# Patient Record
Sex: MALE | Race: WHITE | Employment: UNEMPLOYED | URBAN - METROPOLITAN AREA
[De-identification: names, ages, dates, MRNs, and addresses within clinical notes are randomized per-mention and may not be internally consistent; named-entity substitution may affect disease eponyms.]

---

## 2017-01-05 ENCOUNTER — TELEPHONE (OUTPATIENT)
Dept: FAMILY MEDICINE CLINIC | Age: 47
End: 2017-01-05

## 2017-01-16 RX ORDER — ZOLPIDEM TARTRATE 10 MG/1
10 TABLET ORAL NIGHTLY PRN
Qty: 30 TABLET | Refills: 3 | Status: SHIPPED | OUTPATIENT
Start: 2017-01-16 | End: 2017-02-15

## 2017-01-27 ENCOUNTER — OFFICE VISIT (OUTPATIENT)
Dept: SLEEP MEDICINE | Age: 47
End: 2017-01-27

## 2017-01-27 VITALS
SYSTOLIC BLOOD PRESSURE: 139 MMHG | HEIGHT: 72 IN | HEART RATE: 95 BPM | OXYGEN SATURATION: 98 % | BODY MASS INDEX: 35.76 KG/M2 | WEIGHT: 264 LBS | DIASTOLIC BLOOD PRESSURE: 60 MMHG

## 2017-01-27 DIAGNOSIS — F51.04 INSOMNIA, PSYCHOPHYSIOLOGICAL: Chronic | ICD-10-CM

## 2017-01-27 DIAGNOSIS — J30.2 SEASONAL ALLERGIC RHINITIS, UNSPECIFIED ALLERGIC RHINITIS TRIGGER: Chronic | ICD-10-CM

## 2017-01-27 DIAGNOSIS — K21.9 GASTROESOPHAGEAL REFLUX DISEASE WITHOUT ESOPHAGITIS: Chronic | ICD-10-CM

## 2017-01-27 DIAGNOSIS — E66.9 NON MORBID OBESITY, UNSPECIFIED OBESITY TYPE: Chronic | ICD-10-CM

## 2017-01-27 DIAGNOSIS — G47.33 OBSTRUCTIVE SLEEP APNEA SYNDROME: Primary | Chronic | ICD-10-CM

## 2017-01-27 PROCEDURE — 99214 OFFICE O/P EST MOD 30 MIN: CPT | Performed by: NURSE PRACTITIONER

## 2017-01-27 ASSESSMENT — SLEEP AND FATIGUE QUESTIONNAIRES
HOW LIKELY ARE YOU TO NOD OFF OR FALL ASLEEP WHILE SITTING QUIETLY AFTER LUNCH WITHOUT ALCOHOL: 0
HOW LIKELY ARE YOU TO NOD OFF OR FALL ASLEEP WHILE LYING DOWN TO REST IN THE AFTERNOON WHEN CIRCUMSTANCES PERMIT: 1
HOW LIKELY ARE YOU TO NOD OFF OR FALL ASLEEP WHILE WATCHING TV: 0
HOW LIKELY ARE YOU TO NOD OFF OR FALL ASLEEP WHILE SITTING INACTIVE IN A PUBLIC PLACE: 0
ESS TOTAL SCORE: 1
HOW LIKELY ARE YOU TO NOD OFF OR FALL ASLEEP IN A CAR, WHILE STOPPED FOR A FEW MINUTES IN TRAFFIC: 0
HOW LIKELY ARE YOU TO NOD OFF OR FALL ASLEEP WHILE SITTING AND TALKING TO SOMEONE: 0
HOW LIKELY ARE YOU TO NOD OFF OR FALL ASLEEP WHEN YOU ARE A PASSENGER IN A CAR FOR AN HOUR WITHOUT A BREAK: 0
HOW LIKELY ARE YOU TO NOD OFF OR FALL ASLEEP WHILE SITTING AND READING: 0

## 2017-01-27 ASSESSMENT — ENCOUNTER SYMPTOMS
SINUS PRESSURE: 0
APNEA: 0
SHORTNESS OF BREATH: 0
ABDOMINAL PAIN: 0
RHINORRHEA: 0
COUGH: 0
ABDOMINAL DISTENTION: 0

## 2017-03-27 RX ORDER — FLUOXETINE HYDROCHLORIDE 20 MG/1
CAPSULE ORAL
Qty: 90 CAPSULE | Refills: 0 | Status: SHIPPED | OUTPATIENT
Start: 2017-03-27 | End: 2017-05-23 | Stop reason: SDUPTHER

## 2017-03-27 RX ORDER — ATORVASTATIN CALCIUM 40 MG/1
TABLET, FILM COATED ORAL
Qty: 30 TABLET | Refills: 0 | Status: SHIPPED | OUTPATIENT
Start: 2017-03-27 | End: 2017-05-02 | Stop reason: SDUPTHER

## 2017-03-28 ENCOUNTER — HOSPITAL ENCOUNTER (OUTPATIENT)
Dept: CT IMAGING | Age: 47
Discharge: HOME | End: 2017-03-28
Attending: UROLOGY | Admitting: UROLOGY

## 2017-03-28 DIAGNOSIS — C64.2 MALIGNANT NEOPLASM OF LEFT KIDNEY, EXCEPT RENAL PELVIS (HCC): ICD-10-CM

## 2017-04-24 ENCOUNTER — TELEPHONE (OUTPATIENT)
Dept: FAMILY MEDICINE CLINIC | Age: 47
End: 2017-04-24

## 2017-04-27 ENCOUNTER — TELEPHONE (OUTPATIENT)
Dept: FAMILY MEDICINE CLINIC | Age: 47
End: 2017-04-27

## 2017-05-01 ENCOUNTER — OFFICE VISIT (OUTPATIENT)
Dept: FAMILY MEDICINE CLINIC | Age: 47
End: 2017-05-01

## 2017-05-01 VITALS
RESPIRATION RATE: 12 BRPM | HEART RATE: 84 BPM | WEIGHT: 279 LBS | SYSTOLIC BLOOD PRESSURE: 128 MMHG | BODY MASS INDEX: 37.84 KG/M2 | DIASTOLIC BLOOD PRESSURE: 80 MMHG

## 2017-05-01 DIAGNOSIS — G89.4 CHRONIC PAIN SYNDROME: Primary | Chronic | ICD-10-CM

## 2017-05-01 PROCEDURE — 99213 OFFICE O/P EST LOW 20 MIN: CPT | Performed by: NURSE PRACTITIONER

## 2017-05-01 ASSESSMENT — ENCOUNTER SYMPTOMS
CONSTIPATION: 0
VOMITING: 0
CHEST TIGHTNESS: 0
ABDOMINAL PAIN: 1
NAUSEA: 0
BLOOD IN STOOL: 0
DIARRHEA: 0
SHORTNESS OF BREATH: 0

## 2017-05-02 RX ORDER — ATORVASTATIN CALCIUM 40 MG/1
TABLET, FILM COATED ORAL
Qty: 30 TABLET | Refills: 2 | Status: SHIPPED | OUTPATIENT
Start: 2017-05-02 | End: 2017-07-27 | Stop reason: SDUPTHER

## 2017-05-12 ENCOUNTER — TELEPHONE (OUTPATIENT)
Dept: SLEEP MEDICINE | Age: 47
End: 2017-05-12

## 2017-05-15 RX ORDER — ZOLPIDEM TARTRATE 10 MG/1
10 TABLET ORAL NIGHTLY PRN
Qty: 30 TABLET | Refills: 1 | Status: SHIPPED | OUTPATIENT
Start: 2017-05-15 | End: 2017-07-10 | Stop reason: SDUPTHER

## 2017-05-23 ENCOUNTER — OFFICE VISIT (OUTPATIENT)
Dept: PSYCHIATRY | Age: 47
End: 2017-05-23

## 2017-05-23 VITALS
HEART RATE: 84 BPM | SYSTOLIC BLOOD PRESSURE: 163 MMHG | RESPIRATION RATE: 18 BRPM | HEIGHT: 72 IN | DIASTOLIC BLOOD PRESSURE: 97 MMHG

## 2017-05-23 DIAGNOSIS — F32.A DEPRESSION, UNSPECIFIED DEPRESSION TYPE: Primary | ICD-10-CM

## 2017-05-23 PROCEDURE — 99214 OFFICE O/P EST MOD 30 MIN: CPT | Performed by: PSYCHIATRY & NEUROLOGY

## 2017-05-23 RX ORDER — FLUOXETINE HYDROCHLORIDE 20 MG/1
CAPSULE ORAL
Qty: 90 CAPSULE | Refills: 1 | Status: SHIPPED | OUTPATIENT
Start: 2017-05-23 | End: 2017-11-27 | Stop reason: SDUPTHER

## 2017-05-23 RX ORDER — QUETIAPINE FUMARATE 400 MG/1
400 TABLET, FILM COATED ORAL NIGHTLY
Qty: 90 TABLET | Refills: 1 | Status: SHIPPED | OUTPATIENT
Start: 2017-05-23 | End: 2017-11-27 | Stop reason: SDUPTHER

## 2017-05-23 RX ORDER — LITHIUM CARBONATE 300 MG/1
TABLET, FILM COATED, EXTENDED RELEASE ORAL
Qty: 180 TABLET | Refills: 1 | Status: SHIPPED | OUTPATIENT
Start: 2017-05-23 | End: 2017-12-15

## 2017-05-30 ENCOUNTER — TELEPHONE (OUTPATIENT)
Dept: FAMILY MEDICINE CLINIC | Age: 47
End: 2017-05-30

## 2017-05-30 DIAGNOSIS — S92.909A FOOT FRACTURE, UNSPECIFIED LATERALITY, CLOSED, INITIAL ENCOUNTER: Primary | ICD-10-CM

## 2017-05-31 ENCOUNTER — OFFICE VISIT (OUTPATIENT)
Dept: ORTHOPEDIC SURGERY | Age: 47
End: 2017-05-31

## 2017-05-31 VITALS
RESPIRATION RATE: 16 BRPM | HEART RATE: 81 BPM | HEIGHT: 72 IN | WEIGHT: 276 LBS | SYSTOLIC BLOOD PRESSURE: 121 MMHG | BODY MASS INDEX: 37.38 KG/M2 | DIASTOLIC BLOOD PRESSURE: 79 MMHG

## 2017-05-31 DIAGNOSIS — F32.A DEPRESSION, UNSPECIFIED DEPRESSION TYPE: ICD-10-CM

## 2017-05-31 DIAGNOSIS — S92.352A CLOSED DISPLACED FRACTURE OF FIFTH METATARSAL BONE OF LEFT FOOT, INITIAL ENCOUNTER: Primary | ICD-10-CM

## 2017-05-31 LAB
CHOLESTEROL, TOTAL: 100 MG/DL (ref 0–199)
HDLC SERPL-MCNC: 47 MG/DL (ref 40–60)
LDL CHOLESTEROL CALCULATED: 37 MG/DL
LITHIUM DOSE AMOUNT: ABNORMAL
LITHIUM LEVEL: 0.3 MMOL/L (ref 0.6–1.2)
TRIGL SERPL-MCNC: 78 MG/DL (ref 0–150)
TSH SERPL DL<=0.05 MIU/L-ACNC: 0.95 UIU/ML (ref 0.27–4.2)
VLDLC SERPL CALC-MCNC: 16 MG/DL

## 2017-05-31 PROCEDURE — 28470 CLTX METATARSAL FX WO MNP EA: CPT | Performed by: ORTHOPAEDIC SURGERY

## 2017-05-31 PROCEDURE — 36415 COLL VENOUS BLD VENIPUNCTURE: CPT | Performed by: PSYCHIATRY & NEUROLOGY

## 2017-05-31 PROCEDURE — 99243 OFF/OP CNSLTJ NEW/EST LOW 30: CPT | Performed by: ORTHOPAEDIC SURGERY

## 2017-06-01 PROBLEM — S92.352A CLOSED DISPLACED FRACTURE OF FIFTH METATARSAL BONE OF LEFT FOOT: Status: ACTIVE | Noted: 2017-06-01

## 2017-06-01 LAB
ESTIMATED AVERAGE GLUCOSE: 102.5 MG/DL
HBA1C MFR BLD: 5.2 %

## 2017-07-10 RX ORDER — ZOLPIDEM TARTRATE 10 MG/1
10 TABLET ORAL NIGHTLY PRN
Qty: 30 TABLET | Refills: 0 | Status: SHIPPED | OUTPATIENT
Start: 2017-07-10 | End: 2017-08-11 | Stop reason: SDUPTHER

## 2017-08-11 ENCOUNTER — OFFICE VISIT (OUTPATIENT)
Dept: SLEEP MEDICINE | Age: 47
End: 2017-08-11

## 2017-08-11 VITALS
OXYGEN SATURATION: 98 % | HEIGHT: 72 IN | SYSTOLIC BLOOD PRESSURE: 110 MMHG | BODY MASS INDEX: 37.38 KG/M2 | DIASTOLIC BLOOD PRESSURE: 70 MMHG | WEIGHT: 276 LBS | HEART RATE: 88 BPM

## 2017-08-11 DIAGNOSIS — K21.9 GASTROESOPHAGEAL REFLUX DISEASE WITHOUT ESOPHAGITIS: Chronic | ICD-10-CM

## 2017-08-11 DIAGNOSIS — E66.9 NON MORBID OBESITY, UNSPECIFIED OBESITY TYPE: Chronic | ICD-10-CM

## 2017-08-11 DIAGNOSIS — G47.33 OBSTRUCTIVE SLEEP APNEA SYNDROME: Primary | Chronic | ICD-10-CM

## 2017-08-11 DIAGNOSIS — F51.04 INSOMNIA, PSYCHOPHYSIOLOGICAL: Chronic | ICD-10-CM

## 2017-08-11 DIAGNOSIS — J30.2 SEASONAL ALLERGIC RHINITIS, UNSPECIFIED ALLERGIC RHINITIS TRIGGER: Chronic | ICD-10-CM

## 2017-08-11 PROCEDURE — 99214 OFFICE O/P EST MOD 30 MIN: CPT | Performed by: NURSE PRACTITIONER

## 2017-08-11 RX ORDER — ZOLPIDEM TARTRATE 10 MG/1
10 TABLET ORAL NIGHTLY PRN
Qty: 90 TABLET | Refills: 1 | Status: SHIPPED | OUTPATIENT
Start: 2017-08-11 | End: 2018-01-12 | Stop reason: SDUPTHER

## 2017-08-11 ASSESSMENT — ENCOUNTER SYMPTOMS
SINUS PRESSURE: 0
ABDOMINAL DISTENTION: 0
ABDOMINAL PAIN: 0
SHORTNESS OF BREATH: 0
COUGH: 0
RHINORRHEA: 0
APNEA: 0

## 2017-08-11 ASSESSMENT — SLEEP AND FATIGUE QUESTIONNAIRES
HOW LIKELY ARE YOU TO NOD OFF OR FALL ASLEEP WHILE SITTING AND READING: 0
HOW LIKELY ARE YOU TO NOD OFF OR FALL ASLEEP WHILE SITTING AND TALKING TO SOMEONE: 0
HOW LIKELY ARE YOU TO NOD OFF OR FALL ASLEEP WHEN YOU ARE A PASSENGER IN A CAR FOR AN HOUR WITHOUT A BREAK: 0
HOW LIKELY ARE YOU TO NOD OFF OR FALL ASLEEP WHILE SITTING INACTIVE IN A PUBLIC PLACE: 0
ESS TOTAL SCORE: 2
HOW LIKELY ARE YOU TO NOD OFF OR FALL ASLEEP WHILE WATCHING TV: 1
HOW LIKELY ARE YOU TO NOD OFF OR FALL ASLEEP IN A CAR, WHILE STOPPED FOR A FEW MINUTES IN TRAFFIC: 0
HOW LIKELY ARE YOU TO NOD OFF OR FALL ASLEEP WHILE SITTING QUIETLY AFTER LUNCH WITHOUT ALCOHOL: 0
HOW LIKELY ARE YOU TO NOD OFF OR FALL ASLEEP WHILE LYING DOWN TO REST IN THE AFTERNOON WHEN CIRCUMSTANCES PERMIT: 1

## 2017-11-27 RX ORDER — LITHIUM CARBONATE 300 MG/1
TABLET, FILM COATED, EXTENDED RELEASE ORAL
Qty: 180 TABLET | Refills: 0 | Status: SHIPPED | OUTPATIENT
Start: 2017-11-27 | End: 2018-02-10 | Stop reason: SDUPTHER

## 2017-11-27 RX ORDER — FLUOXETINE HYDROCHLORIDE 20 MG/1
CAPSULE ORAL
Qty: 90 CAPSULE | Refills: 0 | Status: SHIPPED | OUTPATIENT
Start: 2017-11-27 | End: 2018-04-09 | Stop reason: SDUPTHER

## 2017-11-27 RX ORDER — QUETIAPINE FUMARATE 400 MG/1
400 TABLET, FILM COATED ORAL NIGHTLY
Qty: 90 TABLET | Refills: 0 | Status: SHIPPED | OUTPATIENT
Start: 2017-11-27 | End: 2018-04-09 | Stop reason: SDUPTHER

## 2017-12-15 ENCOUNTER — OFFICE VISIT (OUTPATIENT)
Dept: FAMILY MEDICINE CLINIC | Age: 47
End: 2017-12-15

## 2017-12-15 VITALS
DIASTOLIC BLOOD PRESSURE: 86 MMHG | TEMPERATURE: 98.7 F | RESPIRATION RATE: 14 BRPM | HEIGHT: 72 IN | HEART RATE: 68 BPM | BODY MASS INDEX: 35.76 KG/M2 | SYSTOLIC BLOOD PRESSURE: 120 MMHG | WEIGHT: 264 LBS

## 2017-12-15 DIAGNOSIS — I63.312 CEREBRAL INFARCTION DUE TO THROMBOSIS OF LEFT MIDDLE CEREBRAL ARTERY (HCC): ICD-10-CM

## 2017-12-15 DIAGNOSIS — N52.01 ERECTILE DYSFUNCTION DUE TO ARTERIAL INSUFFICIENCY: Primary | ICD-10-CM

## 2017-12-15 DIAGNOSIS — M54.50 ACUTE RIGHT-SIDED LOW BACK PAIN WITHOUT SCIATICA: ICD-10-CM

## 2017-12-15 PROBLEM — S92.352A CLOSED DISPLACED FRACTURE OF FIFTH METATARSAL BONE OF LEFT FOOT: Status: RESOLVED | Noted: 2017-06-01 | Resolved: 2017-12-15

## 2017-12-15 PROCEDURE — 99214 OFFICE O/P EST MOD 30 MIN: CPT | Performed by: FAMILY MEDICINE

## 2017-12-15 RX ORDER — SILDENAFIL CITRATE 20 MG/1
80 TABLET ORAL PRN
Qty: 30 TABLET | Refills: 5 | Status: SHIPPED | OUTPATIENT
Start: 2017-12-15 | End: 2019-06-06 | Stop reason: SDUPTHER

## 2017-12-15 NOTE — PROGRESS NOTES
Subjective:      Patient ID: Dom Perez is a 56 y.o. male. Blood pressure 120/86, pulse 68, temperature 98.7 °F (37.1 °C), temperature source Oral, resp. rate 14, height 6' (1.829 m), weight 264 lb (119.7 kg). HPI here for check up on chronic med problems as below. He is seeing dr Emani Durbin for depression. Has done very well on lithium, prozac. Uses seroquel also, which helps with sleep. Sees dr Noel Garcia for agustina and insomnia- on cpap and ambien long term. He remains alcohol free x 2 yrs and goes to AA. Not smoking  He is happy to be past all of the intra-abdominal infections and surgeries. I prescribe atorva for Hyperlipidemia: no CAD, but has had a stroke. lipids are stable:     Lab Results   Component Value Date    CHOL 100 05/31/2017    TRIG 78 05/31/2017    HDL 47 05/31/2017    LDLCALC 37 05/31/2017     Lab Results   Component Value Date    ALT 10 05/14/2017    AST 9 (L) 05/14/2017         Does  Use prilosec OTC for barretts and gerd- is now asymptomatic. Last EGD was in 2016. Sees Jaja. Today he wants to discuss that he has had R lower back pain for the past month. Seems worst when lays down overnight, with prolonged sitting. Better if up and moving about. Bending is also painful. No radiation. No injury recalled. No leg weakness, numbness or tingling. Is constant, but worst first thing in the morning. No major back pain in past.  rx used: tylenol. did not help much. 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 Not exercising much. Also he is concerned about erectile dysfunction the past few months. Maybe 6 months. Interest in sex is good. Able to achieve erection, but difficult to maintain. Does have some AM tumescence, but is only partial.  Unable to maintain erection for orgasm. No marital discord. Has not had any recent psych med changes- on current regimen for years. No bp meds. No hx HTN.     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  S/P right knee arthroscopy    Chondromalacia of patellofemoral joint    Chronic pain syndrome    Cerebral infarction due to thrombosis of left middle cerebral artery (HCC)    Intracranial hypertension    Renal mass, left--found incidentally on ct 1/16 (for small bowel obst)-sent to urology 2/16--dr Brant Warren [facility]TOHism ([facility])--1/16--detoxed at [facility]--attending AA    Gastroesophageal reflux disease with esophagitis--on daily ppi    Intractable chronic migraine without aura and with status migrainosus      Body mass index is 35.8 kg/m².     Wt Readings from Last 3 Encounters:   12/15/17 264 lb (119.7 kg)   08/11/17 276 lb (125.2 kg)   05/31/17 276 lb (125.2 kg)      BP Readings from Last 3 Encounters:   12/15/17 120/86   08/11/17 110/70   05/31/17 121/79      Current Outpatient Prescriptions   Medication Sig Dispense Refill    lithium (LITHOBID) 300 MG extended release tablet TAKE 1 TABLET BY MOUTH TWICE DAILY 180 tablet 0    FLUoxetine (PROZAC) 20 MG capsule TAKE 1 CAPSULE BY MOUTH DAILY 90 capsule 0    QUEtiapine (SEROQUEL) 400 MG tablet Take 1 tablet by mouth nightly 90 tablet 0    atorvastatin (LIPITOR) 40 MG tablet TAKE 1 TABLET BY MOUTH EVERY NIGHT AT BEDTIME 30 tablet 3    zolpidem (AMBIEN) 10 MG tablet Take 1 tablet by mouth nightly as needed for Sleep 90 tablet 1    aspirin 81 MG tablet   Take 81 mg by mouth daily       calcium-vitamin D (OSCAL 500/200 D-3) 500-200 MG-UNIT per tablet Take 2 tablets by mouth daily Take 1 tablet by mouth daily.  omeprazole (PRILOSEC OTC) 20 MG tablet Take 20 mg by mouth 2 times daily.  Multiple Vitamin TABS Take 1 tablet by mouth daily        No current facility-administered medications for this visit. Immunization History   Administered Date(s) Administered    HIB PRP-T (ActHIB, Hiberix) 09/13/2010    Hib, unspecified foumulation 02/09/2015    Influenza Whole 10/04/2012    Influenza, Intradermal, Preservative free 10/28/2014    Influenza, Intradermal, Quadrivalent, Preservative Free 12/13/2016    Meningococcal MCV4P (Menactra) 02/09/2015    Meningococcal Vaccine, unspecified formulation 01/15/2009    Pneumococcal 13-valent Conjugate ([id number]) 02/09/2015    Pneumococcal Polysaccharide ([id number]) 01/15/2009, 10/09/2013    Tdap (Boostrix, Adacel) 01/01/2007, 05/03/2014        Family History   Problem Relation Age of Onset    Cancer Father      Lymphoma    Dementia Other     Diabetes Other     Cancer Other     Diabetes Maternal Uncle     Heart Disease Maternal Uncle     Depression Other      Social History     Social History    Marital status:      Spouse name: April Joiner Number of children: 2    Years of education: N/A     Occupational History     --sales at [employer]      Social History Main Topics    Smoking status: Former Smoker     Packs/day: 0.50     Years: 25.00     Types: Cigarettes    Smokeless tobacco: Never Used      Comment: will try to quit after the wound has improved and he's dealing with ETOH    Alcohol use No      Comment: last drink 2months ago    Drug use: No    Sexual activity: Yes     Partners: Female     Other Topics Concern    Not on file     Social History Narrative    No narrative on file      Review of Systems No chest pains, dizziness, heart palpitations, dyspnea, lightheadedness, worsening edema. Objective:   Physical Exam   Constitutional: He is oriented to person, place, and time.  He appears well-developed and well-nourished. No distress. HENT:   Head: Normocephalic and atraumatic. Nose: Nose normal.   Mouth/Throat: Oropharynx is clear and moist. No oropharyngeal exudate. TM's: nl  Hearing: nl   Eyes: Conjunctivae are normal. Pupils are equal, round, and reactive to light. Right eye exhibits no discharge. Left eye exhibits no discharge. Neck: Normal range of motion. Neck supple. No thyromegaly present. Cardiovascular: Normal rate, regular rhythm, normal heart sounds and intact distal pulses. Pulmonary/Chest: Effort normal and breath sounds normal. No respiratory distress. He has no wheezes. He has no rales. Musculoskeletal:   Lumbar spine: FROM. SLR neg. DTR's intact. Sensation normal LE's. Nontender over lumbar spine. Palpation of his R SI joint reproduces his pain. Lymphadenopathy:     He has no cervical adenopathy. Right: No supraclavicular adenopathy present. Left: No supraclavicular adenopathy present. Neurological: He is alert and oriented to person, place, and time. Skin: Skin is warm and dry. He is not diaphoretic. Psychiatric: He has a normal mood and affect. His behavior is normal. Judgment and thought content normal.   Nursing note and vitals reviewed. Assessment:      1. Erectile dysfunction due to arterial insufficiency  - discussed that ED is related to blood vessel dysfunction. Weight loss usually helps. OK to use viagra; discussed risks, side effects, etc.    - Prolactin; Future (test this the next time he has labs drawn); TSH is normal and monitored regularly by psych due to lithium use. 2. Acute right-sided low back pain without sciatica  - likely SI dycfunction. Referred to PT. Avoid NSAIDs due to prior CVA. - External Referral To Physical Therapy    3. Cerebral infarction due to thrombosis of left middle cerebral artery (HCC)  - Stable; continue asa and atorva daily. encouraged to resume exercise when back feels better. Plan:       Fu prn.

## 2018-01-12 RX ORDER — ZOLPIDEM TARTRATE 10 MG/1
10 TABLET ORAL NIGHTLY PRN
Qty: 30 TABLET | Refills: 0 | Status: SHIPPED | OUTPATIENT
Start: 2018-01-12 | End: 2018-02-09 | Stop reason: SDUPTHER

## 2018-02-09 ENCOUNTER — OFFICE VISIT (OUTPATIENT)
Dept: SLEEP MEDICINE | Age: 48
End: 2018-02-09

## 2018-02-09 VITALS
DIASTOLIC BLOOD PRESSURE: 70 MMHG | HEIGHT: 72 IN | WEIGHT: 276.4 LBS | SYSTOLIC BLOOD PRESSURE: 124 MMHG | BODY MASS INDEX: 37.44 KG/M2 | HEART RATE: 84 BPM | OXYGEN SATURATION: 97 % | RESPIRATION RATE: 16 BRPM

## 2018-02-09 DIAGNOSIS — E66.9 NON MORBID OBESITY, UNSPECIFIED OBESITY TYPE: Chronic | ICD-10-CM

## 2018-02-09 DIAGNOSIS — J30.2 SEASONAL ALLERGIC RHINITIS, UNSPECIFIED CHRONICITY, UNSPECIFIED TRIGGER: Chronic | ICD-10-CM

## 2018-02-09 DIAGNOSIS — K21.9 GASTROESOPHAGEAL REFLUX DISEASE WITHOUT ESOPHAGITIS: Chronic | ICD-10-CM

## 2018-02-09 DIAGNOSIS — F51.04 INSOMNIA, PSYCHOPHYSIOLOGICAL: Chronic | ICD-10-CM

## 2018-02-09 DIAGNOSIS — G47.33 OBSTRUCTIVE SLEEP APNEA SYNDROME: Primary | Chronic | ICD-10-CM

## 2018-02-09 PROCEDURE — 99214 OFFICE O/P EST MOD 30 MIN: CPT | Performed by: NURSE PRACTITIONER

## 2018-02-09 RX ORDER — ZOLPIDEM TARTRATE 10 MG/1
10 TABLET ORAL NIGHTLY PRN
Qty: 90 TABLET | Refills: 1 | Status: SHIPPED | OUTPATIENT
Start: 2018-02-09 | End: 2018-05-10

## 2018-02-09 ASSESSMENT — ENCOUNTER SYMPTOMS
RHINORRHEA: 0
ABDOMINAL DISTENTION: 0
SHORTNESS OF BREATH: 0
COUGH: 0
APNEA: 0
SINUS PRESSURE: 0
ABDOMINAL PAIN: 0

## 2018-02-09 ASSESSMENT — SLEEP AND FATIGUE QUESTIONNAIRES
HOW LIKELY ARE YOU TO NOD OFF OR FALL ASLEEP WHILE SITTING AND TALKING TO SOMEONE: 0
HOW LIKELY ARE YOU TO NOD OFF OR FALL ASLEEP WHEN YOU ARE A PASSENGER IN A CAR FOR AN HOUR WITHOUT A BREAK: 0
HOW LIKELY ARE YOU TO NOD OFF OR FALL ASLEEP WHILE LYING DOWN TO REST IN THE AFTERNOON WHEN CIRCUMSTANCES PERMIT: 1
HOW LIKELY ARE YOU TO NOD OFF OR FALL ASLEEP WHILE SITTING INACTIVE IN A PUBLIC PLACE: 0
HOW LIKELY ARE YOU TO NOD OFF OR FALL ASLEEP IN A CAR, WHILE STOPPED FOR A FEW MINUTES IN TRAFFIC: 0
HOW LIKELY ARE YOU TO NOD OFF OR FALL ASLEEP WHILE WATCHING TV: 0
HOW LIKELY ARE YOU TO NOD OFF OR FALL ASLEEP WHILE SITTING QUIETLY AFTER LUNCH WITHOUT ALCOHOL: 0
ESS TOTAL SCORE: 1
HOW LIKELY ARE YOU TO NOD OFF OR FALL ASLEEP WHILE SITTING AND READING: 0

## 2018-02-09 NOTE — PROGRESS NOTES
wake time  6:30am   Taking Naps  no   If taking naps usual length    [x] NA   If taking naps using the machine  [] Yes  [] No  [x] NA   Drowsy when driving  [] Yes  [x] No     Does patient carry a DOT/CDL  [] Yes  [x] No     Does patient carry FAA/[employer] License   [] Yes  [x] No      Any concerns noted with the machine at this time  [] Yes  [x] No         Review of Systems   Constitutional: Negative for appetite change, chills, fatigue and fever. HENT: Negative for congestion, nosebleeds, rhinorrhea and sinus pressure. Respiratory: Negative for apnea, cough and shortness of breath. Cardiovascular: Negative for chest pain and palpitations. Gastrointestinal: Negative for abdominal distention and abdominal pain. Neurological: Negative for dizziness and headaches. Social History     Social History    Marital status:      Spouse name: Bri Conrad Number of children: 2    Years of education: N/A     Occupational History     --sales at [employer]      Social History Main Topics    Smoking status: Former Smoker     Packs/day: 0.50     Years: 25.00     Types: Cigarettes    Smokeless tobacco: Never Used      Comment: will try to quit after the wound has improved and he's dealing with ETOH    Alcohol use No      Comment: last drink 2months ago    Drug use: No    Sexual activity: Yes     Partners: Female     Other Topics Concern    Not on file     Social History Narrative    No narrative on file       Prior to Admission medications    Medication Sig Start Date End Date Taking? Authorizing Provider   zolpidem (AMBIEN) 10 MG tablet Take 1 tablet by mouth nightly as needed for Sleep for up to 90 days.  2/9/18 5/10/18 Yes Jacqui Carr CNP   lithium (LITHOBID) 300 MG extended release tablet TAKE 1 TABLET BY MOUTH TWICE DAILY 11/27/17  Yes Freddy Anglin MD   FLUoxetine (PROZAC) 20 MG capsule TAKE 1 CAPSULE BY MOUTH DAILY 11/27/17  Yes Freddy Anglin MD   QUEtiapine (SEROQUEL) 400 MG tablet Take 1 tablet by mouth nightly 11/27/17  Yes Arnulfo Antunez MD   atorvastatin (LIPITOR) 40 MG tablet TAKE 1 TABLET BY MOUTH EVERY NIGHT AT BEDTIME 11/3/17  Yes Jayjay Gomez MD   aspirin 81 MG tablet   Take 81 mg by mouth daily    Yes Historical Provider, MD   calcium-vitamin D (OSCAL 500/200 D-3) 500-200 MG-UNIT per tablet Take 2 tablets by mouth daily Take 1 tablet by mouth daily. Yes Historical Provider, MD   omeprazole (PRILOSEC OTC) 20 MG tablet Take 20 mg by mouth 2 times daily. Yes Historical Provider, MD   Multiple Vitamin TABS Take 1 tablet by mouth daily    Yes Historical Provider, MD   sildenafil (REVATIO) 20 MG tablet Take 4 tablets by mouth as needed (30 min prior to intercourse) 12/15/17   Jayjay Gomze MD       Allergies as of 02/09/2018 - Review Complete 02/09/2018   Allergen Reaction Noted    Nsaids Nausea Only 07/11/2013    Prochlorperazine  12/30/2015    Morphine Rash 04/26/2016       Patient Active Problem List   Diagnosis    Insomnia-chronic intermittent--uses prn ambien--to be filled by dr Donna Wood (sleep dr)   [place] daily ppi-last egd 5/15--dr Regi Gtz History of tobacco useadvised to quit- quit 7/1/2014    Allergic rhinitis, seasonal    Obstructive sleep apnea,Severe -(on cpap)     Morbid Obesity-s/p rou en y-2009    Depression-on daily effexor xr--sees dr Loreto Buckley    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     S/P right knee arthroscopy    Chondromalacia of patellofemoral joint    Chronic pain syndrome    Cerebral infarction due to thrombosis of left middle cerebral artery (HCC)    Intracranial hypertension    Renal mass, left--found incidentally on ct 1/16 (for small bowel obst)-[facility]--[facility]--attending AA    Gastroesophageal reflux disease with esophagitis--on daily ppi    Intractable chronic

## 2018-02-12 RX ORDER — ATORVASTATIN CALCIUM 40 MG/1
TABLET, FILM COATED ORAL
Qty: 30 TABLET | Refills: 3 | Status: SHIPPED | OUTPATIENT
Start: 2018-02-12 | End: 2018-06-26 | Stop reason: SDUPTHER

## 2018-02-14 RX ORDER — LITHIUM CARBONATE 300 MG/1
TABLET, FILM COATED, EXTENDED RELEASE ORAL
Qty: 60 TABLET | Refills: 0 | Status: SHIPPED | OUTPATIENT
Start: 2018-02-14 | End: 2018-03-28 | Stop reason: SDUPTHER

## 2018-02-20 ENCOUNTER — HOSPITAL ENCOUNTER (OUTPATIENT)
Dept: CT IMAGING | Age: 48
Discharge: HOME | End: 2018-02-20
Attending: UROLOGY | Admitting: UROLOGY

## 2018-02-20 VITALS
DIASTOLIC BLOOD PRESSURE: 69 MMHG | SYSTOLIC BLOOD PRESSURE: 130 MMHG | RESPIRATION RATE: 18 BRPM | OXYGEN SATURATION: 98 % | TEMPERATURE: 98.6 F | HEART RATE: 79 BPM

## 2018-02-20 DIAGNOSIS — C64.2 MALIGNANT NEOPLASM OF LEFT KIDNEY, EXCEPT RENAL PELVIS (HCC): ICD-10-CM

## 2018-02-20 LAB
INR BLD: 1.05 (ref 0.85–1.15)
PLATELET # BLD: 402 K/UL (ref 135–450)
PROTHROMBIN TIME: 11.9 SEC (ref 9.6–13)

## 2018-02-20 RX ORDER — ONDANSETRON 2 MG/ML
4 INJECTION INTRAMUSCULAR; INTRAVENOUS EVERY 8 HOURS PRN
Status: DISCONTINUED | OUTPATIENT
Start: 2018-02-20 | End: 2018-02-21 | Stop reason: HOSPADM

## 2018-02-20 RX ORDER — MIDAZOLAM HYDROCHLORIDE 1 MG/ML
INJECTION INTRAMUSCULAR; INTRAVENOUS DAILY PRN
Status: COMPLETED | OUTPATIENT
Start: 2018-02-20 | End: 2018-02-20

## 2018-02-20 RX ORDER — ACETAMINOPHEN 325 MG/1
650 TABLET ORAL EVERY 4 HOURS PRN
Status: DISCONTINUED | OUTPATIENT
Start: 2018-02-20 | End: 2018-02-21 | Stop reason: HOSPADM

## 2018-02-20 RX ORDER — FENTANYL CITRATE 50 UG/ML
INJECTION, SOLUTION INTRAMUSCULAR; INTRAVENOUS DAILY PRN
Status: COMPLETED | OUTPATIENT
Start: 2018-02-20 | End: 2018-02-20

## 2018-02-20 RX ADMIN — FENTANYL CITRATE 50 MCG: 50 INJECTION, SOLUTION INTRAMUSCULAR; INTRAVENOUS at 13:43

## 2018-02-20 RX ADMIN — MIDAZOLAM HYDROCHLORIDE 2 MG: 1 INJECTION INTRAMUSCULAR; INTRAVENOUS at 13:43

## 2018-02-20 ASSESSMENT — PAIN SCALES - GENERAL
PAINLEVEL_OUTOF10: 0

## 2018-02-23 ENCOUNTER — PATIENT MESSAGE (OUTPATIENT)
Dept: FAMILY MEDICINE CLINIC | Age: 48
End: 2018-02-23

## 2018-02-23 DIAGNOSIS — C64.2 RENAL CELL CARCINOMA OF LEFT KIDNEY (HCC): Primary | ICD-10-CM

## 2018-02-23 NOTE — TELEPHONE ENCOUNTER
From: Emmett Waller  To: Keena Weeks MD  Sent: 2/23/2018 8:51 AM EST  Subject: Non-Urgent Medical Question    I have been seeing a urologist regarding a renal cell carcinoma on my right kidney however I would like a second opinion.  Wondering if you might be able to refer me to a nephrologist.

## 2018-03-28 RX ORDER — LITHIUM CARBONATE 300 MG/1
TABLET, FILM COATED, EXTENDED RELEASE ORAL
Qty: 60 TABLET | Refills: 0 | Status: SHIPPED | OUTPATIENT
Start: 2018-03-28 | End: 2018-04-09 | Stop reason: SDUPTHER

## 2018-04-01 PROBLEM — K62.5 RECTAL BLEED: Status: ACTIVE | Noted: 2018-04-01

## 2018-04-02 PROBLEM — N28.89 RENAL MASS, LEFT: Status: ACTIVE | Noted: 2018-04-02

## 2018-04-02 PROBLEM — D62 ACUTE BLOOD LOSS ANEMIA: Status: ACTIVE | Noted: 2018-04-02

## 2018-04-02 PROBLEM — D50.9 IRON DEFICIENCY ANEMIA: Status: ACTIVE | Noted: 2018-04-02

## 2018-04-02 PROBLEM — R07.89 RIGHT-SIDED CHEST WALL PAIN: Status: ACTIVE | Noted: 2018-04-02

## 2018-04-02 PROBLEM — E53.8 B12 DEFICIENCY: Status: ACTIVE | Noted: 2018-04-02

## 2018-04-02 PROBLEM — K92.2 GI BLEED: Status: ACTIVE | Noted: 2018-04-01

## 2018-04-02 PROBLEM — K28.9 ANASTOMOTIC ULCER: Status: ACTIVE | Noted: 2018-04-02

## 2018-04-05 DIAGNOSIS — N52.01 ERECTILE DYSFUNCTION DUE TO ARTERIAL INSUFFICIENCY: ICD-10-CM

## 2018-04-05 LAB — PROLACTIN: 6.3 NG/ML

## 2018-04-09 ENCOUNTER — OFFICE VISIT (OUTPATIENT)
Dept: PSYCHIATRY | Age: 48
End: 2018-04-09

## 2018-04-09 VITALS
HEIGHT: 72 IN | HEART RATE: 104 BPM | WEIGHT: 285.4 LBS | DIASTOLIC BLOOD PRESSURE: 60 MMHG | BODY MASS INDEX: 38.66 KG/M2 | SYSTOLIC BLOOD PRESSURE: 124 MMHG

## 2018-04-09 DIAGNOSIS — F32.A DEPRESSION, UNSPECIFIED DEPRESSION TYPE: Primary | ICD-10-CM

## 2018-04-09 PROCEDURE — 99214 OFFICE O/P EST MOD 30 MIN: CPT | Performed by: PSYCHIATRY & NEUROLOGY

## 2018-04-09 RX ORDER — LITHIUM CARBONATE 300 MG/1
TABLET, FILM COATED, EXTENDED RELEASE ORAL
Qty: 180 TABLET | Refills: 1 | Status: SHIPPED | OUTPATIENT
Start: 2018-04-09 | End: 2018-05-02 | Stop reason: SDUPTHER

## 2018-04-09 RX ORDER — QUETIAPINE FUMARATE 400 MG/1
200 TABLET, FILM COATED ORAL NIGHTLY
Qty: 90 TABLET | Refills: 0 | Status: SHIPPED | OUTPATIENT
Start: 2018-04-09 | End: 2018-12-18 | Stop reason: SDUPTHER

## 2018-04-09 RX ORDER — FLUOXETINE HYDROCHLORIDE 20 MG/1
CAPSULE ORAL
Qty: 90 CAPSULE | Refills: 1 | Status: SHIPPED | OUTPATIENT
Start: 2018-04-09 | End: 2018-11-05 | Stop reason: SDUPTHER

## 2018-04-13 ENCOUNTER — OFFICE VISIT (OUTPATIENT)
Dept: FAMILY MEDICINE CLINIC | Age: 48
End: 2018-04-13

## 2018-04-13 VITALS
DIASTOLIC BLOOD PRESSURE: 76 MMHG | BODY MASS INDEX: 38.47 KG/M2 | HEART RATE: 70 BPM | WEIGHT: 284 LBS | TEMPERATURE: 98.8 F | RESPIRATION RATE: 14 BRPM | HEIGHT: 72 IN | SYSTOLIC BLOOD PRESSURE: 120 MMHG

## 2018-04-13 DIAGNOSIS — K92.2 GASTROINTESTINAL HEMORRHAGE, UNSPECIFIED GASTROINTESTINAL HEMORRHAGE TYPE: Primary | ICD-10-CM

## 2018-04-13 DIAGNOSIS — D64.9 ANEMIA, UNSPECIFIED TYPE: ICD-10-CM

## 2018-04-13 DIAGNOSIS — N28.89 RENAL MASS, LEFT: ICD-10-CM

## 2018-04-13 DIAGNOSIS — S20.211A CONTUSION OF RIGHT CHEST WALL, INITIAL ENCOUNTER: ICD-10-CM

## 2018-04-13 PROCEDURE — 1111F DSCHRG MED/CURRENT MED MERGE: CPT | Performed by: FAMILY MEDICINE

## 2018-04-13 PROCEDURE — 99214 OFFICE O/P EST MOD 30 MIN: CPT | Performed by: FAMILY MEDICINE

## 2018-05-01 DIAGNOSIS — F32.A DEPRESSION, UNSPECIFIED DEPRESSION TYPE: ICD-10-CM

## 2018-05-01 LAB
ANION GAP SERPL CALCULATED.3IONS-SCNC: 11 MMOL/L (ref 3–16)
BUN BLDV-MCNC: 7 MG/DL (ref 7–20)
CALCIUM SERPL-MCNC: 9.3 MG/DL (ref 8.3–10.6)
CHLORIDE BLD-SCNC: 104 MMOL/L (ref 99–110)
CHOLESTEROL, TOTAL: 102 MG/DL (ref 0–199)
CO2: 27 MMOL/L (ref 21–32)
CREAT SERPL-MCNC: 0.7 MG/DL (ref 0.9–1.3)
GFR AFRICAN AMERICAN: >60
GFR NON-AFRICAN AMERICAN: >60
GLUCOSE BLD-MCNC: 92 MG/DL (ref 70–99)
HDLC SERPL-MCNC: 56 MG/DL (ref 40–60)
LDL CHOLESTEROL CALCULATED: 28 MG/DL
LITHIUM DOSE AMOUNT: ABNORMAL
LITHIUM LEVEL: 0.4 MMOL/L (ref 0.6–1.2)
POTASSIUM SERPL-SCNC: 4.5 MMOL/L (ref 3.5–5.1)
SODIUM BLD-SCNC: 142 MMOL/L (ref 136–145)
TRIGL SERPL-MCNC: 90 MG/DL (ref 0–150)
TSH SERPL DL<=0.05 MIU/L-ACNC: 1.14 UIU/ML (ref 0.27–4.2)
VLDLC SERPL CALC-MCNC: 18 MG/DL

## 2018-05-02 ENCOUNTER — PATIENT MESSAGE (OUTPATIENT)
Dept: PSYCHIATRY | Age: 48
End: 2018-05-02

## 2018-05-02 LAB
ESTIMATED AVERAGE GLUCOSE: 93.9 MG/DL
HBA1C MFR BLD: 4.9 %

## 2018-05-02 RX ORDER — LITHIUM CARBONATE 300 MG/1
TABLET, FILM COATED, EXTENDED RELEASE ORAL
Qty: 180 TABLET | Refills: 1 | Status: SHIPPED | OUTPATIENT
Start: 2018-05-02 | End: 2018-11-05 | Stop reason: SDUPTHER

## 2018-05-25 ENCOUNTER — PATIENT MESSAGE (OUTPATIENT)
Dept: FAMILY MEDICINE CLINIC | Age: 48
End: 2018-05-25

## 2018-06-27 RX ORDER — ATORVASTATIN CALCIUM 40 MG/1
TABLET, FILM COATED ORAL
Qty: 30 TABLET | Refills: 5 | Status: SHIPPED | OUTPATIENT
Start: 2018-06-27 | End: 2018-12-31 | Stop reason: SDUPTHER

## 2018-07-11 ENCOUNTER — OFFICE VISIT (OUTPATIENT)
Dept: FAMILY MEDICINE CLINIC | Age: 48
End: 2018-07-11

## 2018-07-11 VITALS
WEIGHT: 278 LBS | HEART RATE: 55 BPM | SYSTOLIC BLOOD PRESSURE: 120 MMHG | TEMPERATURE: 98.7 F | OXYGEN SATURATION: 97 % | HEIGHT: 72 IN | DIASTOLIC BLOOD PRESSURE: 86 MMHG | BODY MASS INDEX: 37.65 KG/M2 | RESPIRATION RATE: 14 BRPM

## 2018-07-11 DIAGNOSIS — I63.312 CEREBRAL INFARCTION DUE TO THROMBOSIS OF LEFT MIDDLE CEREBRAL ARTERY (HCC): ICD-10-CM

## 2018-07-11 DIAGNOSIS — F10.20 ETOHISM (HCC): ICD-10-CM

## 2018-07-11 DIAGNOSIS — D75.839 THROMBOCYTOSIS: Primary | ICD-10-CM

## 2018-07-11 DIAGNOSIS — N28.89 RENAL MASS, LEFT: ICD-10-CM

## 2018-07-11 DIAGNOSIS — D50.9 IRON DEFICIENCY ANEMIA, UNSPECIFIED IRON DEFICIENCY ANEMIA TYPE: ICD-10-CM

## 2018-07-11 DIAGNOSIS — Z01.818 PREOP EXAMINATION: Primary | ICD-10-CM

## 2018-07-11 DIAGNOSIS — K21.00 GASTROESOPHAGEAL REFLUX DISEASE WITH ESOPHAGITIS: ICD-10-CM

## 2018-07-11 DIAGNOSIS — R79.1 PROLONGED PTT: ICD-10-CM

## 2018-07-11 DIAGNOSIS — E53.8 B12 DEFICIENCY: ICD-10-CM

## 2018-07-11 PROBLEM — R07.89 RIGHT-SIDED CHEST WALL PAIN: Status: RESOLVED | Noted: 2018-04-02 | Resolved: 2018-07-11

## 2018-07-11 PROBLEM — K92.2 GI BLEED: Status: RESOLVED | Noted: 2018-04-01 | Resolved: 2018-07-11

## 2018-07-11 LAB
ANION GAP SERPL CALCULATED.3IONS-SCNC: 11 MMOL/L (ref 3–16)
APTT: 40.7 SEC (ref 26–36)
BUN BLDV-MCNC: 4 MG/DL (ref 7–20)
CALCIUM SERPL-MCNC: 9.6 MG/DL (ref 8.3–10.6)
CHLORIDE BLD-SCNC: 104 MMOL/L (ref 99–110)
CO2: 27 MMOL/L (ref 21–32)
CREAT SERPL-MCNC: 0.9 MG/DL (ref 0.9–1.3)
GFR AFRICAN AMERICAN: >60
GFR NON-AFRICAN AMERICAN: >60
GLUCOSE BLD-MCNC: 91 MG/DL (ref 70–99)
HCT VFR BLD CALC: 38.4 % (ref 40.5–52.5)
HEMOGLOBIN: 12.3 G/DL (ref 13.5–17.5)
INR BLD: 1.1 (ref 0.86–1.14)
MCH RBC QN AUTO: 24.6 PG (ref 26–34)
MCHC RBC AUTO-ENTMCNC: 32.1 G/DL (ref 31–36)
MCV RBC AUTO: 76.7 FL (ref 80–100)
PDW BLD-RTO: 24.8 % (ref 12.4–15.4)
PLATELET # BLD: 483 K/UL (ref 135–450)
PMV BLD AUTO: 8.7 FL (ref 5–10.5)
POTASSIUM SERPL-SCNC: 4.8 MMOL/L (ref 3.5–5.1)
PROTHROMBIN TIME: 12.5 SEC (ref 9.8–13)
RBC # BLD: 5 M/UL (ref 4.2–5.9)
SODIUM BLD-SCNC: 142 MMOL/L (ref 136–145)
WBC # BLD: 6.5 K/UL (ref 4–11)

## 2018-07-11 PROCEDURE — 99243 OFF/OP CNSLTJ NEW/EST LOW 30: CPT | Performed by: FAMILY MEDICINE

## 2018-07-11 NOTE — PROGRESS NOTES
Anastomotic ulcer- took carafate the month of April. No just on daily PPI. No further GI bleeding.  Renal mass, left         Duration of problem: slowly enlarging left renal mass for past 2 yrs. bx neg but continues to grow. discussed at tumor board and consensus was to remove it. Assoc sx are: no symptoms or hematuria. Alleviating factors are: none. Planned anesthesia is General.  The patient has the following known anesthesia issues: no prior anesthesia problems. Patient has a bleeding risk of : no recent abnormal bleeding  Patient does not have objection to receiving blood products if needed. Past Medical History:   Diagnosis Date    Allergic rhinitis, seasonal     Arthritis     right knee    Vaughn's esophagus     Cancer determined by renal biopsy (HCC)     left kidney     Carpal tunnel syndrome     Depression     Depression     GERD (gastroesophageal reflux disease)     Headache(784.0)     History of tobacco use     Quit 7/2014    Infectious mononucleosis     History Of     Migraine     chronic for 1 year    Morbid obesity ([address]     Movement disorder     Onychomycosis     Oral candidiasis     Sleep apnea in adult     Sleep apnea, obstructive     Severe uses cpap stop bang 6    Tinea pedis     Unspecified cerebral artery occlusion with cerebral infarction 2014     Past Surgical History:   Procedure Laterality Date    ABDOMEN SURGERY      gastric bypass    ABDOMEN SURGERY  4-7-2016    repair of recurrent incisional hernia with mesh, removal of old mesh, bilateral component separation    ABDOMINAL EXPLORATION SURGERY  1/11/16    exp lap, lysis of adhesions, small bowel resection    CARPAL TUNNEL RELEASE      bilat    DILATATION, ESOPHAGUS      ENDOSCOPY, COLON, DIAGNOSTIC      EYE SURGERY      GASTRIC BYPASS SURGERY  Jan 2009    Has lost about 200 pounds.     HERNIA REPAIR  3-    ventral    JOINT REPLACEMENT      KNEE ARTHROSCOPY Right 7/11/2013    Cherri Pulliam behavior is normal. Judgment and thought content normal.   Nursing note and vitals reviewed. Predictors of intubation difficulty:   Morbid obesity? no   Anatomically abnormal facies? no   Short, thick neck? no   Neck range of motion: normal   Dentition: Edentulous    Cardiographics  ECG: NSR done on 4/7/18  Echocardiogram: Summary     Normal left ventricle size, wall thickness and systolic function with an   estimated ejection fraction of 55%.     No regional wall motion abnormalities are seen.     Mild tricuspid regurgitation is present.      Signature      ------------------------------------------------------------------   Electronically signed by Saddie Libman, MD (Interpreting   physician) on 11/04/2013 at 04:17 PM    Lab Review   No visits with results within 2 Month(s) from this visit. Latest known visit with results is:   Orders Only on 05/01/2018   Component Date Value    Hemoglobin A1C 05/01/2018 4.9     eAG 05/01/2018 93.9     Sodium 05/01/2018 142     Potassium 05/01/2018 4.5     Chloride 05/01/2018 104     CO2 05/01/2018 27     Anion Gap 05/01/2018 11     Glucose 05/01/2018 92     BUN 05/01/2018 7     CREATININE 05/01/2018 0.7*    GFR Non- 05/01/2018 >60     GFR  05/01/2018 >60     Calcium 05/01/2018 9.3     Lithium Lvl 05/01/2018 0.4*    Lithium Dose Amount 05/01/2018 Unknown     TSH 05/01/2018 1.14     Cholesterol, Total 05/01/2018 102     Triglycerides 05/01/2018 90     HDL 05/01/2018 56     LDL Calculated 05/01/2018 28     VLDL Cholesterol Calcula* 05/01/2018 18           Assessment:     53 y.o. y.o. male with planned surgery as above. Difficulty with intubation is not anticipated. 1. Preop examination  - cleared for surgery    2. Renal mass, left--found incidentally on ct 1/16 (for small bowel obst)-sent to urology 2/16--dr Adilene Ling  - agree with surgical removal of kidney to be sure it is not a malignancy.   - BASIC METABOLIC PANEL;

## 2018-07-23 ENCOUNTER — HOSPITAL ENCOUNTER (OUTPATIENT)
Dept: PREADMISSION TESTING | Age: 48
Discharge: HOME | End: 2018-07-23
Attending: UROLOGY | Admitting: UROLOGY

## 2018-07-23 DIAGNOSIS — Z01.818 ENCOUNTER FOR PREADMISSION TESTING: ICD-10-CM

## 2018-07-23 LAB
ABO/RH: NORMAL
ANION GAP SERPL CALCULATED.3IONS-SCNC: 10 MMOL/L (ref 3–16)
ANTIBODY SCREEN: NORMAL
APTT: 38 SEC (ref 26–36)
BUN BLDV-MCNC: 5 MG/DL (ref 7–20)
CALCIUM SERPL-MCNC: 9.2 MG/DL (ref 8.3–10.6)
CHLORIDE BLD-SCNC: 105 MMOL/L (ref 99–110)
CO2: 27 MMOL/L (ref 21–32)
CREAT SERPL-MCNC: 0.9 MG/DL (ref 0.9–1.3)
GFR AFRICAN AMERICAN: >60
GFR NON-AFRICAN AMERICAN: >60
GLUCOSE BLD-MCNC: 58 MG/DL (ref 70–99)
HCT VFR BLD CALC: 37.4 % (ref 40.5–52.5)
HEMOGLOBIN: 11.7 G/DL (ref 13.5–17.5)
INR BLD: 1.05 (ref 0.86–1.14)
MCH RBC QN AUTO: 24.4 PG (ref 26–34)
MCHC RBC AUTO-ENTMCNC: 31.2 G/DL (ref 31–36)
MCV RBC AUTO: 78.2 FL (ref 80–100)
PDW BLD-RTO: 24 % (ref 12.4–15.4)
PLATELET # BLD: 433 K/UL (ref 135–450)
PMV BLD AUTO: 8.6 FL (ref 5–10.5)
POTASSIUM SERPL-SCNC: 4.2 MMOL/L (ref 3.5–5.1)
PROTHROMBIN TIME: 12 SEC (ref 9.8–13)
RBC # BLD: 4.78 M/UL (ref 4.2–5.9)
SODIUM BLD-SCNC: 142 MMOL/L (ref 136–145)
WBC # BLD: 8.1 K/UL (ref 4–11)

## 2018-07-24 LAB
EKG ATRIAL RATE: 312 BPM
EKG DIAGNOSIS: NORMAL
EKG Q-T INTERVAL: 394 MS
EKG QRS DURATION: 90 MS
EKG QTC CALCULATION (BAZETT): 413 MS
EKG R AXIS: 51 DEGREES
EKG T AXIS: 46 DEGREES
EKG VENTRICULAR RATE: 66 BPM

## 2018-07-31 ENCOUNTER — PAT TELEPHONE (OUTPATIENT)
Dept: PREADMISSION TESTING | Age: 48
End: 2018-07-31

## 2018-07-31 VITALS — HEIGHT: 72 IN | WEIGHT: 278 LBS | BODY MASS INDEX: 37.65 KG/M2

## 2018-07-31 RX ORDER — ZOLPIDEM TARTRATE 5 MG/1
10 TABLET ORAL
COMMUNITY
End: 2018-08-21 | Stop reason: SDUPTHER

## 2018-07-31 RX ORDER — ASPIRIN 81 MG/1
81 TABLET ORAL
Status: ON HOLD | COMMUNITY
End: 2019-12-17 | Stop reason: HOSPADM

## 2018-07-31 RX ORDER — ONDANSETRON 4 MG/1
4 TABLET, ORALLY DISINTEGRATING ORAL EVERY 8 HOURS PRN
Status: ON HOLD | COMMUNITY
Start: 2018-02-05 | End: 2018-11-25 | Stop reason: ALTCHOICE

## 2018-07-31 NOTE — PRE-PROCEDURE INSTRUCTIONS
C-Difficile admission screening and protocol:     * Admitted with diarrhea? no     *Prior history of C-Diff. In last 3 months? no     *Antibiotic use in the past 6-8 weeks? no     *Prior hospitalization or nursing home in the last month?    no      [address] time___0600 per pt_________        Surgery time__0730__________    Take the following medications with a sip of water: lithium,prozac and protonix    Do not eat or drink anything after 12:00 midnight prior to your surgery. This includes water chewing gum, mints and ice chips. You may brush your teeth and gargle the morning of your surgery, but do not swallow the water     Please see your family doctor/pediatrician for a history and physical and/or concerning medications. Bring any test results/reports from your physicians office. If you are under the care of a heart doctor or specialist doctor, please be aware that you may be asked to them for clearance    You may be asked to stop blood thinners such as Coumadin, Plavix, Fragmin, Lovenox, etc., or any anti-inflammatories such as:  Aspirin, Ibuprofen, Advil, Naproxen prior to your surgery. We also ask that you stop any OTC medications such as fish oil, vitamin E, glucosamine, garlic, Multivitamins, COQ 10, etc.    We ask that you do not smoke 24 hours prior to surgery  We ask that you do not  drink any alcoholic beverages 24 hours prior to surgery     You must make arrangements for a responsible adult to take you home after your surgery. For your safety you will not be allowed to leave alone or drive yourself home. Your surgery will be cancelled if you do not have a ride home. Also for your safety, it is strongly suggested that someone stay with you the first 24 hours after your surgery. A parent or legal guardian must accompany a child scheduled for surgery and plan to stay at the hospital until the child is discharged. [facility] fax number:  [phone]  Please note these are generalized instructions for all surgical cases, you may be provided with more specific instructions according to your surgery.

## 2018-08-01 PROBLEM — C64.2 RENAL CELL CARCINOMA OF LEFT KIDNEY (HCC): Status: ACTIVE | Noted: 2018-08-01

## 2018-08-21 DIAGNOSIS — G47.33 OBSTRUCTIVE SLEEP APNEA: Primary | Chronic | ICD-10-CM

## 2018-08-21 RX ORDER — ZOLPIDEM TARTRATE 10 MG/1
10 TABLET ORAL NIGHTLY PRN
Qty: 30 TABLET | Refills: 0 | Status: SHIPPED | OUTPATIENT
Start: 2018-08-21 | End: 2018-08-27 | Stop reason: SDUPTHER

## 2018-08-27 ENCOUNTER — OFFICE VISIT (OUTPATIENT)
Dept: SLEEP MEDICINE | Age: 48
End: 2018-08-27

## 2018-08-27 VITALS
WEIGHT: 267 LBS | HEIGHT: 72 IN | DIASTOLIC BLOOD PRESSURE: 78 MMHG | HEART RATE: 70 BPM | BODY MASS INDEX: 36.16 KG/M2 | OXYGEN SATURATION: 100 % | SYSTOLIC BLOOD PRESSURE: 126 MMHG

## 2018-08-27 DIAGNOSIS — G47.33 OBSTRUCTIVE SLEEP APNEA: Chronic | ICD-10-CM

## 2018-08-27 DIAGNOSIS — K21.9 GASTROESOPHAGEAL REFLUX DISEASE WITHOUT ESOPHAGITIS: Primary | Chronic | ICD-10-CM

## 2018-08-27 DIAGNOSIS — F51.04 INSOMNIA, PSYCHOPHYSIOLOGICAL: Chronic | ICD-10-CM

## 2018-08-27 DIAGNOSIS — E66.9 NON MORBID OBESITY, UNSPECIFIED OBESITY TYPE: Chronic | ICD-10-CM

## 2018-08-27 PROCEDURE — 99214 OFFICE O/P EST MOD 30 MIN: CPT | Performed by: NURSE PRACTITIONER

## 2018-08-27 RX ORDER — ZOLPIDEM TARTRATE 10 MG/1
10 TABLET ORAL NIGHTLY PRN
Qty: 90 TABLET | Refills: 1 | Status: ON HOLD | OUTPATIENT
Start: 2018-08-27 | End: 2018-11-27 | Stop reason: HOSPADM

## 2018-08-27 ASSESSMENT — ENCOUNTER SYMPTOMS
ABDOMINAL DISTENTION: 0
SHORTNESS OF BREATH: 0
SINUS PRESSURE: 0
APNEA: 0
ABDOMINAL PAIN: 0
RHINORRHEA: 0
COUGH: 0

## 2018-08-27 ASSESSMENT — SLEEP AND FATIGUE QUESTIONNAIRES
HOW LIKELY ARE YOU TO NOD OFF OR FALL ASLEEP IN A CAR, WHILE STOPPED FOR A FEW MINUTES IN TRAFFIC: 0
HOW LIKELY ARE YOU TO NOD OFF OR FALL ASLEEP WHILE LYING DOWN TO REST IN THE AFTERNOON WHEN CIRCUMSTANCES PERMIT: 0
HOW LIKELY ARE YOU TO NOD OFF OR FALL ASLEEP WHILE SITTING AND TALKING TO SOMEONE: 0
HOW LIKELY ARE YOU TO NOD OFF OR FALL ASLEEP WHILE WATCHING TV: 1
HOW LIKELY ARE YOU TO NOD OFF OR FALL ASLEEP WHEN YOU ARE A PASSENGER IN A CAR FOR AN HOUR WITHOUT A BREAK: 0
HOW LIKELY ARE YOU TO NOD OFF OR FALL ASLEEP WHILE SITTING QUIETLY AFTER LUNCH WITHOUT ALCOHOL: 0
HOW LIKELY ARE YOU TO NOD OFF OR FALL ASLEEP WHILE SITTING AND READING: 0
ESS TOTAL SCORE: 1
HOW LIKELY ARE YOU TO NOD OFF OR FALL ASLEEP WHILE SITTING INACTIVE IN A PUBLIC PLACE: 0

## 2018-08-27 NOTE — PROGRESS NOTES
Antionette Carroll MD, FAASM, [facility]  Mert Hickey, MSN, RN, [address] [address]  Dept: [phone]  Dept Fax: [phone]      [facility] PHYSICIAN PRACTICES  [facility] SLEEP MEDICINE    Subjective:     Patient ID: Mecca Hill is a 53 y.o. male. Chief Complaint   Patient presents with    Sleep Apnea       HPI:      Machine Present today:  Yes    Machine Modem/Download Info:  Compliance (hours/night): 8.75 hrs/night  Download AHI (/hour): 1.9 /HR  Average CPAP Pressure : 16.1 cmH2O           APAP - Settings  Pressure Min: 14 cmH2O  Pressure Max: 20 cmH2O                 Comfort Settings  Humidity Level (0-8): 2  Heated Tubing (Yes/No): Yes  Flex/EPR (0-3): 3 PAP Mask  Mask Type: Full Face mask  Mask Model: Alejandra View  Mask Size: M     [place] - Total score: 1    Follow-up :     Last Visit : February 2018      Patient reports the listed Co-morbidities are well controlled and stable at this time. Subjective Health Changes: L kidney removal     Follow-up :      Over Night Oximetry: [] Yes  [] No  [x] NA   Using O2: [] Yes  [] No  [x] NA   Patient is compliant with the machine  [x] Yes  [] No   Feeling rested when using the machine   [x] Yes  [] No     Pressure is comfortable with inspiration and expiration  [x] Yes  [] No     Noticed changes in pressure   [] Yes  [] No  [x] NA   Mask is fitting well  [x] Yes  [] No   Noting Mask Air Leak  [] Yes  [x] No   Having painful Aerophagia  [] Yes  [x] No   Nocturia   0  per night.    Having  HA upon waking  [] Yes  [x] No   Dry mouth upon waking   [x] Yes  [] No   Congestion upon waking   [] Yes  [x] No    Nose Bleeds  [] Yes  [x] No   Using Sleep Aides  Ambien- 10mg nightly    Understands how to change humidification and/or tubing temperature for comfort while at home  [x] Yes  [] No     Difficulties falling asleep  [] Yes  [x] No   Difficulties disintegrating tablet Take 4 mg by mouth 2/5/18  Yes Historical Provider, MD   atorvastatin (LIPITOR) 40 MG tablet TAKE 1 TABLET BY MOUTH EVERY NIGHT AT BEDTIME 6/27/18  Yes Yary Brown MD   lithium (LITHOBID) 300 MG extended release tablet TAKE 1 TABLET BY MOUTH TWICE DAILY 5/2/18  Yes Sarwat León MD   FLUoxetine (PROZAC) 20 MG capsule TAKE 1 CAPSULE BY MOUTH DAILY 4/9/18  Yes Sarwat León MD   QUEtiapine (SEROQUEL) 400 MG tablet Take 0.5 tablets by mouth nightly 4/9/18  Yes Sarwat León MD   sildenafil (REVATIO) 20 MG tablet Take 4 tablets by mouth as needed (30 min prior to intercourse) 12/15/17  Yes Yary Brown MD   calcium-vitamin D (OSCAL 500/200 D-3) 500-200 MG-UNIT per tablet Take 2 tablets by mouth daily Take 1 tablet by mouth daily. Yes Historical Provider, MD   Multiple Vitamin TABS Take 1 tablet by mouth daily    Yes Historical Provider, MD   pantoprazole (PROTONIX) 40 MG tablet Take 1 tablet by mouth 2 times daily (before meals) 4/3/18 8/1/18  Soham Pro MD       Allergies as of 08/27/2018 - Review Complete 08/27/2018   Allergen Reaction Noted    Prochlorperazine Other (See Comments) 12/30/2015    Morphine Rash 04/26/2016    Morphine and related Itching 11/29/2016    Nsaids Nausea Only 07/11/2013       Patient Active Problem List   Diagnosis    Insomnia-chronic intermittent--uses prn ambien--to be filled by dr Ghassan Mazariegos (sleep dr)   Princess Goins daily ppi-last egd 5/15--dr Indigo Silva History of tobacco useadvised to quit- quit 7/1/2014    Allergic rhinitis, seasonal    Obstructive sleep apnea,Severe -(on cpap)     Morbid Obesity-s/p rou en y-2009    Depression-on daily effexor xr--sees dr Yaw Duarte    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     Chondromalacia of patellofemoral joint    Chronic pain syndrome    Cerebral infarction due to thrombosis of left middle cerebral artery ([address]  Intracranial hypertension    Renal mass, left--found incidentally on ct 1/16 (for small bowel obst)-sent to urology 2/16--dr Jacobo Records ETOHism (HCC)--1/16--detoxed at [facility]--attending AA    Gastroesophageal reflux disease with esophagitis--on daily ppi    Intractable chronic migraine without aura and with status migrainosus    Iron deficiency anemia    B12 deficiency    Anastomotic ulcer    Acute blood loss anemia    Renal cell carcinoma of left kidney (HCC)       Past Medical History:   Diagnosis Date    Allergic rhinitis, seasonal     Arthritis     right knee    Vaughn's esophagus     Carpal tunnel syndrome     Depression     Depression     GERD (gastroesophageal reflux disease)     Headache(784.0)     History of blood transfusion     History of tobacco use     Quit 7/2014    Migraine     chronic for 1 year    Morbid obesity ([address]     Movement disorder     Onychomycosis     Sleep apnea, obstructive     Severe uses cpap stop bang 6    Unspecified cerebral artery occlusion with cerebral infarction 2014    slight weakness in left arm    Wears dentures     full set    Wears glasses        Past Surgical History:   Procedure Laterality Date    ABDOMEN SURGERY      gastric bypass    ABDOMEN SURGERY  4-7-2016    repair of recurrent incisional hernia with mesh, removal of old mesh, bilateral component separation    ABDOMINAL EXPLORATION SURGERY  1/11/16    exp lap, lysis of adhesions, small bowel resection    CARPAL TUNNEL RELEASE      bilat    DILATATION, ESOPHAGUS      ENDOSCOPY, COLON, DIAGNOSTIC      EYE SURGERY      GASTRIC BYPASS SURGERY  Jan 2009    Has lost about 200 pounds.     HERNIA REPAIR  3-    ventral    JOINT REPLACEMENT      KNEE ARTHROSCOPY Right 7/11/2013    Dr.Robert Sanders     KNEE ARTHROSCOPY Right 7/11/12    RIGHT KNEE ARTHROSCOPY WITH CHONDROPLASTY    KNEE SURGERY  July 2012    right, arthroscopy    LAPAROTOMY      LASIK  2005    OTHER SURGICAL HISTORY Left 03/08/2016    CT biopsy ablasion left kidney    OTHER SURGICAL HISTORY  2016    small intestine 12 inch removed, 2 hernia surgeries, another surgery to drain infection from incision.  SPLENECTOMY      TOTAL KNEE ARTHROPLASTY  10/15/13    RIGHT    UPPER GASTROINTESTINAL ENDOSCOPY  6-7-2016    UPPER GASTROINTESTINAL ENDOSCOPY  04/02/2018       Family History   Problem Relation Age of Onset    Cancer Father         Lymphoma    Dementia Other     Diabetes Other     Cancer Other     Diabetes Maternal Uncle     Heart Disease Maternal Uncle     Depression Other        Vitals:  Weight BMI   Wt Readings from Last 3 Encounters:   08/27/18 267 lb (121.1 kg)   08/04/18 292 lb 1.8 oz (132.5 kg)   07/31/18 278 lb (126.1 kg)    Body mass index is 36.21 kg/m². BP HR SaO2   BP Readings from Last 3 Encounters:   08/27/18 126/78   08/04/18 (!) 144/84   07/11/18 120/86    Pulse Readings from Last 3 Encounters:   08/27/18 70   08/04/18 96   07/11/18 55    SpO2 Readings from Last 3 Encounters:   08/27/18 100%   08/04/18 90%   07/11/18 97%        Assessment:     Visit Diagnoses and Associated Orders     Gastroesophageal reflux disease without esophagitis   (Stable)  -  Primary         Obstructive sleep apnea   (Stable)      zolpidem (AMBIEN) 10 MG tablet [80024]           Insomnia, psychophysiological   (Stable)           Non morbid obesity, unspecified obesity type   (Stable)                 The primary encounter diagnosis was Gastroesophageal reflux disease without esophagitis. Diagnoses of Obstructive sleep apnea,Severe -(on cpap), Insomnia, psychophysiological, and Non morbid obesity, unspecified obesity type were also pertinent to this visit. The chronic medical conditions listed are directly related to the primary diagnosis listed above. The management of the primary diagnosis affects the secondary diagnosis and vice versa.   Plan:   - Educated patient and reviewed compliance download with pt.    -Supplies and parts as needed for his machine, these are medically necessary.    - Patient using [employer] for supplies  -Continue medications per his PCP and other physicians.   -Limit caffeine use after 3pm.    -Encouraged him to work on weight loss through diet and exercise. - Will see back to monitor medications effectiveness  - OARRS checked today   -F/U: 6 month. No orders of the defined types were placed in this encounter. Orders Placed This Encounter   Medications    zolpidem (AMBIEN) 10 MG tablet     Sig: Take 1 tablet by mouth nightly as needed for Sleep for up to 90 days. .     Dispense:  90 tablet     Refill:  1       No orders of the defined types were placed in this encounter.       Tyler Alcala, MSN, RN, CNP

## 2018-10-15 ENCOUNTER — OFFICE VISIT (OUTPATIENT)
Dept: FAMILY MEDICINE CLINIC | Age: 48
End: 2018-10-15
Payer: COMMERCIAL

## 2018-10-15 VITALS
RESPIRATION RATE: 14 BRPM | SYSTOLIC BLOOD PRESSURE: 132 MMHG | WEIGHT: 264 LBS | HEIGHT: 72 IN | HEART RATE: 70 BPM | TEMPERATURE: 98 F | DIASTOLIC BLOOD PRESSURE: 80 MMHG | BODY MASS INDEX: 35.76 KG/M2

## 2018-10-15 DIAGNOSIS — C64.2 MALIGNANT NEOPLASM OF LEFT KIDNEY (HCC): ICD-10-CM

## 2018-10-15 DIAGNOSIS — S46.911A STRAIN OF RIGHT SHOULDER, INITIAL ENCOUNTER: Primary | ICD-10-CM

## 2018-10-15 PROBLEM — D62 ACUTE BLOOD LOSS ANEMIA: Status: RESOLVED | Noted: 2018-04-02 | Resolved: 2018-10-15

## 2018-10-15 LAB
ANION GAP SERPL CALCULATED.3IONS-SCNC: 12 MMOL/L (ref 3–16)
BUN BLDV-MCNC: 7 MG/DL (ref 7–20)
CALCIUM SERPL-MCNC: 9.6 MG/DL (ref 8.3–10.6)
CHLORIDE BLD-SCNC: 102 MMOL/L (ref 99–110)
CO2: 26 MMOL/L (ref 21–32)
CREAT SERPL-MCNC: 1.2 MG/DL (ref 0.9–1.3)
GFR AFRICAN AMERICAN: >60
GFR NON-AFRICAN AMERICAN: >60
GLUCOSE BLD-MCNC: 89 MG/DL (ref 70–99)
POTASSIUM SERPL-SCNC: 4.7 MMOL/L (ref 3.5–5.1)
SODIUM BLD-SCNC: 140 MMOL/L (ref 136–145)

## 2018-10-15 PROCEDURE — 99214 OFFICE O/P EST MOD 30 MIN: CPT | Performed by: FAMILY MEDICINE

## 2018-10-15 PROCEDURE — 90682 RIV4 VACC RECOMBINANT DNA IM: CPT | Performed by: FAMILY MEDICINE

## 2018-10-15 PROCEDURE — 90471 IMMUNIZATION ADMIN: CPT | Performed by: FAMILY MEDICINE

## 2018-10-17 ENCOUNTER — HOSPITAL ENCOUNTER (OUTPATIENT)
Dept: PHYSICAL THERAPY | Age: 48
Setting detail: THERAPIES SERIES
Discharge: HOME | End: 2018-10-17
Payer: COMMERCIAL

## 2018-10-17 PROCEDURE — 97161 PT EVAL LOW COMPLEX 20 MIN: CPT

## 2018-10-17 PROCEDURE — 97110 THERAPEUTIC EXERCISES: CPT

## 2018-10-17 PROCEDURE — 97140 MANUAL THERAPY 1/> REGIONS: CPT

## 2018-10-17 PROCEDURE — G8985 CARRY GOAL STATUS: HCPCS

## 2018-10-17 PROCEDURE — G0283 ELEC STIM OTHER THAN WOUND: HCPCS

## 2018-10-17 PROCEDURE — G8984 CARRY CURRENT STATUS: HCPCS

## 2018-10-17 ASSESSMENT — PAIN DESCRIPTION - ONSET: ONSET: AWAKENED FROM SLEEP

## 2018-10-17 ASSESSMENT — PAIN SCALES - GENERAL: PAINLEVEL_OUTOF10: 9

## 2018-10-17 ASSESSMENT — PAIN DESCRIPTION - DESCRIPTORS: DESCRIPTORS: ACHING;DISCOMFORT

## 2018-10-17 ASSESSMENT — PAIN DESCRIPTION - DIRECTION: RADIATING_TOWARDS: UPPER ARM

## 2018-10-17 ASSESSMENT — PAIN DESCRIPTION - LOCATION: LOCATION: SHOULDER

## 2018-10-17 ASSESSMENT — PAIN DESCRIPTION - PAIN TYPE: TYPE: ACUTE PAIN

## 2018-10-17 ASSESSMENT — PAIN DESCRIPTION - ORIENTATION: ORIENTATION: RIGHT

## 2018-10-17 ASSESSMENT — PAIN DESCRIPTION - FREQUENCY: FREQUENCY: CONTINUOUS

## 2018-10-17 NOTE — FLOWSHEET NOTE
Physical Therapy Daily Treatment Note  Date:  10/17/2018    Patient Name:  Bautista Mishra    :  1970  MRN: [id number]    Restrictions/Precautions: Restrictions/Precautions  Restrictions/Precautions: Fall Risk (none)    Pertinent Medical History: Additional Pertinent Hx: left TKR, stroke    Medical/Treatment Diagnosis Information:  · Diagnosis: right shoulder pain  · Treatment Diagnosis: dec function, ROM and strength increased pain    Insurance/Certification information:  PT Insurance Information: [place]  Physician Information:  Referring Practitioner: Dr. Adilson Leon of care signed (Y/N):  Sent cosign    Visit# / total visits:    Pain level: 9/10     G-Code (if applicable):      Date / Visit # G-Code Applied:  /  PT G-Codes  Functional Assessment Tool Used: UEFS  Score: 51.25  Functional Limitation: Carrying, moving and handling objects  Carrying, Moving and Handling Objects Current Status (): At least 20 percent but less than 40 percent impaired, limited or restricted  Carrying, Moving and Handling Objects Goal Status (): At least 1 percent but less than 20 percent impaired, limited or restricted    Progress Note: []  Yes  [x]  No  Next due by: Visit #10      History of Injury: 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 He hasdifficulty sleeping through the night, doing his normal adl's and hobbies. Carrying heavy items also is painful    Subjective:    Increased pain with activity    Objective:  Observation: forward head and shoulders, winged scapula on right  Test measurements:    RUE AROM : Exceptions  R Shoulder Flexion 0-180: 155  R Shoulder Extension 0-45: 45  R Shoulder ABduction 0-180: 156  R Shoulder Int Rotation  0-70: 88  R Shoulder Ext Rotation 0-90: 85     Strength RUE: 5/5 except shoulder flexion 4+/5 with pain  Exercises:  Exercise/Equipment Resistance/Repetitions Other comments   Right shoulder          NT  3 min    Wall Wash 4 way x 10 hep        Door stretch  20 sec x 3 Hep   Chair stretch 20 sec x 3 hep   codmans prn hep   UE ranger  add                             US @50% 1.5 w/cm2 7 min ant shoulder    STM 10 min biceps tendon    IFC/CP 15 min  seated     Other Therapeutic Activities:  Patient was educated on diagnosis, plan of care and prognosis of their complaint. Also, frequency and duration of treatments was discussed. Patient was informed of the attendance policy and issued a copy for their records. Home Exercise Program: Patient given home exercise program and demonstrates correct technique. HEP booklet also issued. Timed Code Treatment Minutes:  30      Total Treatment Minutes:  60    Treatment/Activity Tolerance:  [x] Patient tolerated treatment well [] Patient limited by fatigue  [] Patient limited by pain  [] Patient limited by other medical complications  [] Other:     Prognosis: [x] Good [] Fair  [] Poor    Goals:    Short term goals  Time Frame for Short term goals: 2 weeks  Short term goal 1: decrease pain to 5/10 at worst  Short term goal 2: patient independent with HEP  Short term goal 3: patient consistenently getting 5 hours sleep      Long term goals  Time Frame for Long term goals : 4 week  Long term goal 1: decrease pain to 1-2/10 with activity  Long term goal 2: patient able to maintain good posture throughout treatments  Long term goal 3: patientable to woodwork or play piano without increased pain       Patient Requires Follow-up: [x] Yes  [] No    Plan:   [] Continue per plan of care [] Alter current plan (see comments)  [x] Plan of care initiated [] Hold pending MD visit [] Discharge    Plan for Next Session: See above    Electronically signed by:   Jerman Loja, [address] [place] [address]

## 2018-10-17 NOTE — PROGRESS NOTES
Shoulder ABduction 0-180: 156  R Shoulder Int Rotation  0-70: 88  R Shoulder Ext Rotation 0-90: 85    Strength RUE  Comment: 5/5 except shoulder flexion 4+/5 with pain     Additional Measures  Special Tests: + impingement test    Assessment   Conditions Requiring Skilled Therapeutic Intervention  Body structures, Functions, Activity limitations: Decreased functional mobility ; Decreased ADL status; Decreased ROM; Decreased strength  Assessment: PLOF: Able to play piano without pain and sleep through the night  Patient has symptoms consistent with biceps impingement  Treatment Diagnosis: dec function, ROM and strength increased pain  Prognosis: Good  Decision Making: Low Complexity  History: 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 He hasdifficulty sleeping through the night, doing his normal adl's and hobbies. Carrying heavy items also is painful. Exam: see objective dta  Clinical Presentation: stable  Patient Education: hep, posture  Barriers to Learning: none  REQUIRES PT FOLLOW UP: Yes  Treatment Initiated : HEP< US STM, ice and estim  Activity Tolerance  Activity Tolerance: Patient limited by pain         Plan   Plan  Times per week: 2  Plan weeks: 4  Current Treatment Recommendations: Strengthening, ROM, Manual Therapy - Soft Tissue Mobilization, Home Exercise Program, Modalities    G-Code  PT G-Codes  Functional Assessment Tool Used: UEFS  Score: 51.25  Functional Limitation: Carrying, moving and handling objects  Carrying, Moving and Handling Objects Current Status (): At least 20 percent but less than 40 percent impaired, limited or restricted  Carrying, Moving and Handling Objects Goal Status ():  At least 1 percent but less than 20 percent impaired, limited or restricted    OutComes Score  UEFS Score: 51.25    Goals  Short term goals  Time Frame for Short term goals: 2 weeks  Short

## 2018-10-18 ENCOUNTER — HOSPITAL ENCOUNTER (OUTPATIENT)
Dept: PHYSICAL THERAPY | Age: 48
Setting detail: THERAPIES SERIES
Discharge: HOME | End: 2018-10-18
Payer: COMMERCIAL

## 2018-10-18 PROCEDURE — 6360000002 HC RX W HCPCS

## 2018-10-18 PROCEDURE — 97110 THERAPEUTIC EXERCISES: CPT

## 2018-10-18 PROCEDURE — 97033 APP MDLTY 1+IONTPHRSIS EA 15: CPT

## 2018-10-18 PROCEDURE — 97140 MANUAL THERAPY 1/> REGIONS: CPT

## 2018-10-22 ENCOUNTER — HOSPITAL ENCOUNTER (OUTPATIENT)
Dept: PHYSICAL THERAPY | Age: 48
Setting detail: THERAPIES SERIES
Discharge: HOME | End: 2018-10-22
Payer: COMMERCIAL

## 2018-10-22 PROCEDURE — 97033 APP MDLTY 1+IONTPHRSIS EA 15: CPT

## 2018-10-22 PROCEDURE — G0283 ELEC STIM OTHER THAN WOUND: HCPCS

## 2018-10-22 PROCEDURE — 97140 MANUAL THERAPY 1/> REGIONS: CPT

## 2018-10-22 PROCEDURE — 97110 THERAPEUTIC EXERCISES: CPT

## 2018-10-25 ENCOUNTER — HOSPITAL ENCOUNTER (OUTPATIENT)
Dept: PHYSICAL THERAPY | Age: 48
Setting detail: THERAPIES SERIES
Discharge: HOME | End: 2018-10-25
Payer: COMMERCIAL

## 2018-10-25 PROCEDURE — 97140 MANUAL THERAPY 1/> REGIONS: CPT

## 2018-10-25 PROCEDURE — 97110 THERAPEUTIC EXERCISES: CPT

## 2018-10-25 PROCEDURE — 97033 APP MDLTY 1+IONTPHRSIS EA 15: CPT

## 2018-10-25 NOTE — FLOWSHEET NOTE
Physical Therapy Daily Treatment Note  Date:  10/25/2018    Patient Name:  Stephen Rivas    :  1970  MRN: [id number]    Restrictions/Precautions: Restrictions/Precautions  Restrictions/Precautions: Fall Risk (none)    Pertinent Medical History: Additional Pertinent Hx: left TKR, stroke    Medical/Treatment Diagnosis Information:  · Diagnosis: right shoulder pain  · Treatment Diagnosis: dec function, ROM and strength increased pain    Insurance/Certification information:  PT Insurance Information: [place]  Physician Information:  Referring Practitioner: Dr. Almanza Cutting of care signed (Y/N):  Y  Visit# / total visits:    Pain level: 5/10     G-Code (if applicable):      Date / Visit # G-Code Applied:  /  PT G-Codes  Functional Assessment Tool Used: UEFS  Score: 51.25  Functional Limitation: Carrying, moving and handling objects  Carrying, Moving and Handling Objects Current Status (): At least 20 percent but less than 40 percent impaired, limited or restricted  Carrying, Moving and Handling Objects Goal Status (): At least 1 percent but less than 20 percent impaired, limited or restricted    Progress Note: []  Yes  [x]  No  Next due by: Visit #10      History of Injury: 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 He hasdifficulty sleeping through the night, doing his normal adl's and hobbies. Carrying heavy items also is painful    Subjective:   Mild relief after 2nd apt.  [place] better with in clinic ionto    Objective:  Observation: forward head and shoulders, winged scapula on right  Test measurements:    RUE AROM : Exceptions  R Shoulder Flexion 0-180: 155  R Shoulder Extension 0-45: 45  R Shoulder ABduction 0-180: 156  R Shoulder Int Rotation  0-70: 88  R Shoulder Ext Rotation 0-90: 85     Strength RUE: 5/5 except shoulder flexion 4+/5 with

## 2018-10-26 ENCOUNTER — APPOINTMENT (OUTPATIENT)
Dept: PHYSICAL THERAPY | Age: 48
End: 2018-10-26
Payer: COMMERCIAL

## 2018-10-29 ENCOUNTER — HOSPITAL ENCOUNTER (OUTPATIENT)
Dept: PHYSICAL THERAPY | Age: 48
Setting detail: THERAPIES SERIES
Discharge: HOME | End: 2018-10-29
Payer: COMMERCIAL

## 2018-10-29 PROCEDURE — 97140 MANUAL THERAPY 1/> REGIONS: CPT

## 2018-10-29 PROCEDURE — 97033 APP MDLTY 1+IONTPHRSIS EA 15: CPT

## 2018-10-29 PROCEDURE — 97110 THERAPEUTIC EXERCISES: CPT

## 2018-10-29 NOTE — FLOWSHEET NOTE
pain  Exercises:  Exercise/Equipment Resistance/Repetitions Other comments   Right shoulder          UBE  3 min    hep        Door stretch  20 sec x 3 Hep   Chair stretch 20 sec x 3 R hep   codmans prn hep   UE ranger wall 4 way X 10 ea R          Serratus anterior 1# 2x 10 R    Supine Flexion  1# 2x 10 R         Iontophoresis R bicep tendon TransQE 80mA/min   In clinic #4      STM 10 min biceps tendon    Hammer 1 and 2appplied   seated     Other Therapeutic Activities:  Patient was educated on diagnosis, plan of care and prognosis of their complaint. Also, frequency and duration of treatments was discussed. Patient was informed of the attendance policy and issued a copy for their records. Home Exercise Program: Patient given home exercise program and demonstrates correct technique. HEP booklet also issued.     Timed Code Treatment Minutes:  35      Total Treatment Minutes: 55    Treatment/Activity Tolerance:  [x] Patient tolerated treatment well [] Patient limited by fatigue  [] Patient limited by pain  [] Patient limited by other medical complications  [] Other:     Prognosis: [x] Good [] Fair  [] Poor    Goals:    Short term goals  Time Frame for Short term goals: 2 weeks  Short term goal 1: decrease pain to 5/10 at worst  Short term goal 2: patient independent with HEP  Short term goal 3: patient consistenently getting 5 hours sleep      Long term goals  Time Frame for Long term goals : 4 week  Long term goal 1: decrease pain to 1-2/10 with activity  Long term goal 2: patient able to maintain good posture throughout treatments  Long term goal 3: patientable to woodwork or play piano without increased pain       Patient Requires Follow-up: [x] Yes  [] No    Plan:   [] Continue per plan of care [] Alter current plan (see comments)  [x] Plan of care initiated [] Hold pending MD visit [] Discharge    Plan for Next Session: See above    Electronically signed by:  Asad Musa,

## 2018-10-31 ENCOUNTER — HOSPITAL ENCOUNTER (OUTPATIENT)
Dept: PHYSICAL THERAPY | Age: 48
Setting detail: THERAPIES SERIES
Discharge: HOME | End: 2018-10-31
Payer: COMMERCIAL

## 2018-10-31 PROCEDURE — 97033 APP MDLTY 1+IONTPHRSIS EA 15: CPT

## 2018-10-31 PROCEDURE — 97140 MANUAL THERAPY 1/> REGIONS: CPT

## 2018-10-31 PROCEDURE — 97110 THERAPEUTIC EXERCISES: CPT

## 2018-11-05 ENCOUNTER — HOSPITAL ENCOUNTER (OUTPATIENT)
Dept: PHYSICAL THERAPY | Age: 48
Setting detail: THERAPIES SERIES
Discharge: HOME | End: 2018-11-05
Payer: COMMERCIAL

## 2018-11-05 ENCOUNTER — PATIENT MESSAGE (OUTPATIENT)
Dept: PSYCHIATRY | Age: 48
End: 2018-11-05

## 2018-11-05 PROCEDURE — 97110 THERAPEUTIC EXERCISES: CPT

## 2018-11-05 NOTE — FLOWSHEET NOTE
Resistance/Repetitions Other comments   Right shoulder          UBE  3 min    hep   Pulleys  x 25    Door stretch  20 sec x 3 Hep   Chair stretch 20 sec x 3 R hep   codmans prn hep   UE ranger wall 4 way X 10 ea R     T-band rows                 Ext                IR                ER                UT Red x 10 B  Red x 10 B  Red x 10 R  Red x 10 R  Red x 10 B    Serratus anterior 2# 2x 10 R    Supine Flexion  Side abduction  2# 2x 10 R  2# 1 x 10 R Fatigued at the end   Wall push ups x10    Chest press and overhead  add   Mid trap and Rhomboids  add   Iontophoresis R bicep tendon  80 80mA/min   24 hours #5      STM 10 min biceps tendon    Hammer 1 and 2apppliedhelpful   seated     Other Therapeutic Activities:  Patient was educated on diagnosis, plan of care and prognosis of their complaint. Also, frequency and duration of treatments was discussed. Patient was informed of the attendance policy and issued a copy for their records. 11/5/18 no TTP biceps tendon    Home Exercise Program: Patient given home exercise program and demonstrates correct technique. HEP booklet also issued.     Timed Code Treatment Minutes:  30      Total Treatment Minutes: 30    Treatment/Activity Tolerance:  [x] Patient tolerated treatment well [] Patient limited by fatigue  [] Patient limited by pain  [] Patient limited by other medical complications  [] Other:     Prognosis: [x] Good [] Fair  [] Poor    Goals:    Short term goals  Time Frame for Short term goals: 2 weeks  Short term goal 1: decrease pain to 5/10 at worst  Short term goal 2: patient independent with HEP  Short term goal 3: patient consistenently getting 5 hours sleep      Long term goals  Time Frame for Long term goals : 4 week  Long term goal 1: decrease pain to 1-2/10 with activity  Long term goal 2: patient able to maintain good posture throughout treatments  Long term goal 3: patientable to woodwork or play piano without increased pain       Patient

## 2018-11-06 RX ORDER — LITHIUM CARBONATE 300 MG/1
TABLET, FILM COATED, EXTENDED RELEASE ORAL
Qty: 180 TABLET | Refills: 0 | Status: SHIPPED | OUTPATIENT
Start: 2018-11-06 | End: 2018-12-18 | Stop reason: SDUPTHER

## 2018-11-06 RX ORDER — FLUOXETINE HYDROCHLORIDE 20 MG/1
CAPSULE ORAL
Qty: 90 CAPSULE | Refills: 0 | Status: SHIPPED | OUTPATIENT
Start: 2018-11-06 | End: 2018-12-18 | Stop reason: SDUPTHER

## 2018-11-07 ENCOUNTER — HOSPITAL ENCOUNTER (OUTPATIENT)
Dept: PHYSICAL THERAPY | Age: 48
Setting detail: THERAPIES SERIES
Discharge: HOME | End: 2018-11-07
Payer: COMMERCIAL

## 2018-11-07 PROCEDURE — 97530 THERAPEUTIC ACTIVITIES: CPT

## 2018-11-07 PROCEDURE — G8984 CARRY CURRENT STATUS: HCPCS

## 2018-11-07 PROCEDURE — G8986 CARRY D/C STATUS: HCPCS

## 2018-11-07 PROCEDURE — G8985 CARRY GOAL STATUS: HCPCS

## 2018-11-07 PROCEDURE — 97110 THERAPEUTIC EXERCISES: CPT

## 2018-11-07 NOTE — FLOWSHEET NOTE
Physical Therapy Daily Treatment Note  Date:  2018    Patient Name:  Mor Clement    :  1970  MRN: [id number]    Restrictions/Precautions: Restrictions/Precautions  Restrictions/Precautions: Fall Risk (none)    Pertinent Medical History: Additional Pertinent Hx: left TKR, stroke    Medical/Treatment Diagnosis Information:  · Diagnosis: right shoulder pain  · Treatment Diagnosis: dec function, ROM and strength increased pain    Insurance/Certification information:  PT Insurance Information: [place]  Physician Information:  Referring Practitioner: Dr. Tea Peña of care signed (Y/N):  Y  Visit# / total visits:    Pain level: 0/10     G-Code (if applicable):      Date / Visit # G-Code Applied:  /  PT G-Codes  Functional Assessment Tool Used: UEFS  Score: 51.25  Functional Limitation: Carrying, moving and handling objects  Carrying, Moving and Handling Objects Current Status (): At least 20 percent but less than 40 percent impaired, limited or restricted  Carrying, Moving and Handling Objects Goal Status (): At least 1 percent but less than 20 percent impaired, limited or restricted    Progress Note: []  Yes  [x]  No  Next due by: Visit #10      History of Injury: 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 He hasdifficulty sleeping through the night, doing his normal adl's and hobbies.   Carrying heavy items also is painful    Subjective:   No pain doing well doesn't have any shows until after the first of the year    Objective:  Observation: forward head and shoulders, winged scapula on right  Test measurements:    RUE AROM : Exceptions                                    18  R Shoulder Flexion 0-180: 155   wnl  R Shoulder Extension 0-45: 45   wnl  R Shoulder ABduction 0-180: 156   wnl  R Shoulder Int Rotation  0-70: 88   wnl  R Shoulder Ext Rotation 0-90:

## 2018-11-07 NOTE — DISCHARGE SUMMARY
wnl  R Shoulder Ext Rotation 0-90: 85                               wnl   Assessment:  Summary: Patient has made good progress. He is independent in his home exercises and how to progress them over the next couple of months. He has also been instructed in how to avoid this in the future. Recommend discharge from PT. Patient's response to treatment: excellent    Progress towards goals: All goals met    Current Frequency/Duration:  # Days per week: [] 1 day # Weeks: [] 1 week [x] 4 weeks      [x] 2 days? [] 2 weeks [] 5 weeks      [] 3 days   [] 3 weeks [] 6 weeks       Goal Status:  [x] Achieved [] Partially Achieved  [] Not Achieved     Patient Status: [] Continue per initial plan of Care     [x] Patient now discharged     [] Additional visits requested, Please re-certify for additional visits:      Requested frequency/duration:  X/week for weeks    Electronically signed by: Tom Catalan PT [id number]  If you have any questions or concerns, please don't hesitate to call.   Thank you for your referral.    Physician Signature:________________________________Date:__________________  By signing above, therapists plan is approved by physician

## 2018-11-20 ENCOUNTER — PATIENT MESSAGE (OUTPATIENT)
Dept: PULMONOLOGY | Age: 48
End: 2018-11-20

## 2018-11-21 RX ORDER — PRAMIPEXOLE DIHYDROCHLORIDE 1 MG/1
.5-1 TABLET ORAL NIGHTLY
Qty: 30 TABLET | Refills: 0 | Status: SHIPPED | OUTPATIENT
Start: 2018-11-21 | End: 2018-12-18

## 2018-11-23 ENCOUNTER — HOSPITAL ENCOUNTER (INPATIENT)
Age: 48
LOS: 1 days | Discharge: HOME | DRG: 069 | End: 2018-11-24
Attending: EMERGENCY MEDICINE | Admitting: HOSPITALIST
Payer: COMMERCIAL

## 2018-11-23 ENCOUNTER — APPOINTMENT (OUTPATIENT)
Dept: CT IMAGING | Age: 48
DRG: 069 | End: 2018-11-23
Payer: COMMERCIAL

## 2018-11-23 ENCOUNTER — APPOINTMENT (OUTPATIENT)
Dept: GENERAL RADIOLOGY | Age: 48
DRG: 069 | End: 2018-11-23
Payer: COMMERCIAL

## 2018-11-23 DIAGNOSIS — R47.9 TRANSIENT SPEECH DISTURBANCE: Primary | ICD-10-CM

## 2018-11-23 DIAGNOSIS — R42 LIGHTHEADEDNESS: ICD-10-CM

## 2018-11-23 LAB
A/G RATIO: 1.5 (ref 1.1–2.2)
ALBUMIN SERPL-MCNC: 4.2 G/DL (ref 3.4–5)
ALP BLD-CCNC: 120 U/L (ref 40–129)
ALT SERPL-CCNC: 10 U/L (ref 10–40)
ANION GAP SERPL CALCULATED.3IONS-SCNC: 10 MMOL/L (ref 3–16)
AST SERPL-CCNC: 15 U/L (ref 15–37)
BASOPHILS ABSOLUTE: 0.1 K/UL (ref 0–0.2)
BASOPHILS RELATIVE PERCENT: 1.2 %
BILIRUB SERPL-MCNC: 0.3 MG/DL (ref 0–1)
BUN BLDV-MCNC: 4 MG/DL (ref 7–20)
CALCIUM SERPL-MCNC: 9.4 MG/DL (ref 8.3–10.6)
CHLORIDE BLD-SCNC: 105 MMOL/L (ref 99–110)
CO2: 25 MMOL/L (ref 21–32)
CREAT SERPL-MCNC: 1.1 MG/DL (ref 0.9–1.3)
EOSINOPHILS ABSOLUTE: 0.3 K/UL (ref 0–0.6)
EOSINOPHILS RELATIVE PERCENT: 4.3 %
GFR AFRICAN AMERICAN: >60
GFR NON-AFRICAN AMERICAN: >60
GLOBULIN: 2.8 G/DL
GLUCOSE BLD-MCNC: 113 MG/DL (ref 70–99)
HCT VFR BLD CALC: 42.1 % (ref 40.5–52.5)
HEMOGLOBIN: 13.6 G/DL (ref 13.5–17.5)
LYMPHOCYTES ABSOLUTE: 1.9 K/UL (ref 1–5.1)
LYMPHOCYTES RELATIVE PERCENT: 27.2 %
MCH RBC QN AUTO: 27.9 PG (ref 26–34)
MCHC RBC AUTO-ENTMCNC: 32.2 G/DL (ref 31–36)
MCV RBC AUTO: 86.7 FL (ref 80–100)
MONOCYTES ABSOLUTE: 0.5 K/UL (ref 0–1.3)
MONOCYTES RELATIVE PERCENT: 6.6 %
NEUTROPHILS ABSOLUTE: 4.2 K/UL (ref 1.7–7.7)
NEUTROPHILS RELATIVE PERCENT: 60.7 %
PDW BLD-RTO: 20.5 % (ref 12.4–15.4)
PLATELET # BLD: 365 K/UL (ref 135–450)
PMV BLD AUTO: 8.8 FL (ref 5–10.5)
POTASSIUM REFLEX MAGNESIUM: 4.2 MMOL/L (ref 3.5–5.1)
RBC # BLD: 4.86 M/UL (ref 4.2–5.9)
SODIUM BLD-SCNC: 140 MMOL/L (ref 136–145)
TOTAL PROTEIN: 7 G/DL (ref 6.4–8.2)
TROPONIN: <0.01 NG/ML
WBC # BLD: 7 K/UL (ref 4–11)

## 2018-11-23 PROCEDURE — G0378 HOSPITAL OBSERVATION PER HR: HCPCS

## 2018-11-23 PROCEDURE — 93005 ELECTROCARDIOGRAM TRACING: CPT | Performed by: PHYSICIAN ASSISTANT

## 2018-11-23 PROCEDURE — 2580000003 HC RX 258: Performed by: NURSE PRACTITIONER

## 2018-11-23 PROCEDURE — 85025 COMPLETE CBC W/AUTO DIFF WBC: CPT

## 2018-11-23 PROCEDURE — 80053 COMPREHEN METABOLIC PANEL: CPT

## 2018-11-23 PROCEDURE — 99285 EMERGENCY DEPT VISIT HI MDM: CPT

## 2018-11-23 PROCEDURE — 83036 HEMOGLOBIN GLYCOSYLATED A1C: CPT

## 2018-11-23 PROCEDURE — 70450 CT HEAD/BRAIN W/O DYE: CPT

## 2018-11-23 PROCEDURE — 6370000000 HC RX 637 (ALT 250 FOR IP): Performed by: NURSE PRACTITIONER

## 2018-11-23 PROCEDURE — 6370000000 HC RX 637 (ALT 250 FOR IP): Performed by: PHYSICIAN ASSISTANT

## 2018-11-23 PROCEDURE — 71046 X-RAY EXAM CHEST 2 VIEWS: CPT

## 2018-11-23 PROCEDURE — 2060000000 HC ICU INTERMEDIATE R&B

## 2018-11-23 PROCEDURE — 84484 ASSAY OF TROPONIN QUANT: CPT

## 2018-11-23 RX ORDER — SILDENAFIL CITRATE 20 MG/1
80 TABLET ORAL PRN
Status: DISCONTINUED | OUTPATIENT
Start: 2018-11-23 | End: 2018-11-23

## 2018-11-23 RX ORDER — ONDANSETRON 4 MG/1
4 TABLET, ORALLY DISINTEGRATING ORAL EVERY 8 HOURS PRN
Status: DISCONTINUED | OUTPATIENT
Start: 2018-11-23 | End: 2018-11-24 | Stop reason: HOSPADM

## 2018-11-23 RX ORDER — ZOLPIDEM TARTRATE 5 MG/1
10 TABLET ORAL NIGHTLY PRN
Status: DISCONTINUED | OUTPATIENT
Start: 2018-11-23 | End: 2018-11-24 | Stop reason: HOSPADM

## 2018-11-23 RX ORDER — ASPIRIN 81 MG/1
81 TABLET ORAL DAILY
Status: DISCONTINUED | OUTPATIENT
Start: 2018-11-24 | End: 2018-11-24 | Stop reason: HOSPADM

## 2018-11-23 RX ORDER — PANTOPRAZOLE SODIUM 40 MG/1
40 TABLET, DELAYED RELEASE ORAL
Status: DISCONTINUED | OUTPATIENT
Start: 2018-11-24 | End: 2018-11-24 | Stop reason: HOSPADM

## 2018-11-23 RX ORDER — ONDANSETRON 2 MG/ML
4 INJECTION INTRAMUSCULAR; INTRAVENOUS EVERY 6 HOURS PRN
Status: DISCONTINUED | OUTPATIENT
Start: 2018-11-23 | End: 2018-11-24 | Stop reason: HOSPADM

## 2018-11-23 RX ORDER — QUETIAPINE FUMARATE 200 MG/1
200 TABLET, FILM COATED ORAL NIGHTLY
Status: DISCONTINUED | OUTPATIENT
Start: 2018-11-23 | End: 2018-11-24 | Stop reason: HOSPADM

## 2018-11-23 RX ORDER — FLUOXETINE HYDROCHLORIDE 20 MG/1
20 CAPSULE ORAL DAILY
Status: DISCONTINUED | OUTPATIENT
Start: 2018-11-24 | End: 2018-11-24 | Stop reason: HOSPADM

## 2018-11-23 RX ORDER — LITHIUM CARBONATE 300 MG/1
300 TABLET, FILM COATED, EXTENDED RELEASE ORAL 2 TIMES DAILY
Status: DISCONTINUED | OUTPATIENT
Start: 2018-11-23 | End: 2018-11-24 | Stop reason: HOSPADM

## 2018-11-23 RX ORDER — SODIUM CHLORIDE 0.9 % (FLUSH) 0.9 %
10 SYRINGE (ML) INJECTION EVERY 12 HOURS SCHEDULED
Status: DISCONTINUED | OUTPATIENT
Start: 2018-11-23 | End: 2018-11-24 | Stop reason: HOSPADM

## 2018-11-23 RX ORDER — ASPIRIN 81 MG/1
324 TABLET, CHEWABLE ORAL ONCE
Status: COMPLETED | OUTPATIENT
Start: 2018-11-23 | End: 2018-11-23

## 2018-11-23 RX ORDER — SODIUM CHLORIDE 0.9 % (FLUSH) 0.9 %
10 SYRINGE (ML) INJECTION PRN
Status: DISCONTINUED | OUTPATIENT
Start: 2018-11-23 | End: 2018-11-24 | Stop reason: HOSPADM

## 2018-11-23 RX ORDER — PRAMIPEXOLE DIHYDROCHLORIDE 0.5 MG/1
1 TABLET ORAL NIGHTLY
Status: DISCONTINUED | OUTPATIENT
Start: 2018-11-23 | End: 2018-11-24 | Stop reason: HOSPADM

## 2018-11-23 RX ORDER — MULTIVITAMIN WITH FOLIC ACID 400 MCG
1 TABLET ORAL DAILY
Status: DISCONTINUED | OUTPATIENT
Start: 2018-11-24 | End: 2018-11-24 | Stop reason: HOSPADM

## 2018-11-23 RX ORDER — ATORVASTATIN CALCIUM 40 MG/1
40 TABLET, FILM COATED ORAL NIGHTLY
Status: DISCONTINUED | OUTPATIENT
Start: 2018-11-23 | End: 2018-11-24 | Stop reason: HOSPADM

## 2018-11-23 RX ORDER — LABETALOL HYDROCHLORIDE 5 MG/ML
10 INJECTION, SOLUTION INTRAVENOUS EVERY 10 MIN PRN
Status: DISCONTINUED | OUTPATIENT
Start: 2018-11-23 | End: 2018-11-24 | Stop reason: HOSPADM

## 2018-11-23 RX ADMIN — ASPIRIN 81 MG 324 MG: 81 TABLET ORAL at 19:23

## 2018-11-23 RX ADMIN — PRAMIPEXOLE DIHYDROCHLORIDE 1 MG: 0.5 TABLET ORAL at 22:25

## 2018-11-23 RX ADMIN — ATORVASTATIN CALCIUM 40 MG: 40 TABLET, FILM COATED ORAL at 22:26

## 2018-11-23 RX ADMIN — LITHIUM CARBONATE 300 MG: 300 TABLET, FILM COATED, EXTENDED RELEASE ORAL at 22:26

## 2018-11-23 RX ADMIN — QUETIAPINE FUMARATE 200 MG: 200 TABLET ORAL at 22:26

## 2018-11-23 RX ADMIN — Medication 10 ML: at 22:25

## 2018-11-23 RX ADMIN — ZOLPIDEM TARTRATE 10 MG: 5 TABLET ORAL at 22:25

## 2018-11-23 ASSESSMENT — ENCOUNTER SYMPTOMS
CHEST TIGHTNESS: 0
VOMITING: 0
NAUSEA: 0
RHINORRHEA: 0
COLOR CHANGE: 0

## 2018-11-23 NOTE — ED PROVIDER NOTES
[facility]  eMERGENCY dEPARTMENT eNCOUnter        Pt Name: Lester Judd  MRN: [id number]  [place] 1970  Date of evaluation: 11/23/2018  Provider: Georgina Stone PA-C  PCP: Namrata Walker MD    This patient was seen and evaluated by the attending physician Vanesa Ramos MD.      [address] [place] [address]       Chief Complaint   Patient presents with    Dizziness     Pt feeling dizzy for 1 day off and on. HISTORY OF PRESENT ILLNESS   (Location/Symptom, Timing/Onset, Context/Setting, Quality, Duration, Modifying Factors, Severity)  Note limiting factors. Lester Judd is a 53 y.o. male Presents emergency Department by private vehicle with 2 chief complaints. First, she landed intermittent lightheadedness beginning yesterday. Patient states he has slight lightheadedness which comes in waves with increased severity. Uncertain of any alleviating or aggravating factors. No sensory chest pain, shortness of breath, nausea, vomiting, visual disturbance or headache. Secondly, patient has had intermittent altered mental status beginning earlier today. Patient states he has trouble expressing his thoughts intermittently. Family states he also answers questions with inappropriate responses occasionally. Symptoms have been intermittent since this morning. Last known normal last night before he fell asleep. Symptoms have been coming and going, no symptoms currently. Patient with normal mental status at this time. Patient denies any symptoms currently. Patient with a past surgical history of Vaughn's esophagus, CVA in 2014, sleep apnea, GERD. Patient had nephrectomy secondary to renal carcinoma done on 8/1/18. Not currently on chemotherapy or radiation. Nursing Notes were all reviewed and agreed with or any disagreements were addressed  in the HPI.     REVIEW OF SYSTEMS    (2-9 systems for level 4, 10 or more for level 5)     Review of Systems Diabetes Other     Cancer Other     Diabetes Maternal Uncle     Heart Disease Maternal Uncle     Depression Other           SOCIAL HISTORY       Social History     Social History    Marital status:      Spouse name: Cely Lin Number of children: 2    Years of education: N/A     Occupational History     --sales at [employer]      Social History Main Topics    Smoking status: Former Smoker     Packs/day: 0.50     Years: 25.00     Types: Cigarettes     Quit date: 7/31/2017    Smokeless tobacco: Never Used    Alcohol use No      Comment: last drink 2016    Drug use: No    Sexual activity: Yes     Partners: Female     Other Topics Concern    None     Social History Narrative    None       SCREENINGS             PHYSICAL EXAM    (up to 7 for level 4, 8 or more for level 5)     ED Triage Vitals [11/23/18 1557]   BP Temp Temp Source Pulse Resp SpO2 Height Weight   118/74 98 °F (36.7 °C) Oral 80 16 98 % 6' (1.829 m) 266 lb 8.6 oz (120.9 kg)       Physical Exam   Constitutional: He is oriented to person, place, and time. He appears well-developed and well-nourished. HENT:   Head: Normocephalic and atraumatic. Eyes: Conjunctivae and EOM are normal.   Neck: Normal range of motion. Neck supple. Cardiovascular: Normal rate and regular rhythm. Pulmonary/Chest: Effort normal and breath sounds normal. No respiratory distress. He has no wheezes. He has no rales. Musculoskeletal: Normal range of motion. Neurological: He is alert and oriented to person, place, and time. He has normal strength. No cranial nerve deficit. Coordination normal.   No ataxia, normal mental status   Skin: Skin is warm. He is not diaphoretic. No erythema. Psychiatric: He has a normal mood and affect. His behavior is normal.   Vitals reviewed.       DIAGNOSTIC RESULTS   LABS:    Labs Reviewed   CBC WITH AUTO DIFFERENTIAL - Abnormal; Notable for the following:        Result Value    RDW 20.5 (*)     All other

## 2018-11-24 ENCOUNTER — APPOINTMENT (OUTPATIENT)
Dept: MRI IMAGING | Age: 48
DRG: 069 | End: 2018-11-24
Payer: COMMERCIAL

## 2018-11-24 ENCOUNTER — APPOINTMENT (OUTPATIENT)
Dept: CT IMAGING | Age: 48
DRG: 069 | End: 2018-11-24
Payer: COMMERCIAL

## 2018-11-24 ENCOUNTER — HOSPITAL ENCOUNTER (OUTPATIENT)
Age: 48
Setting detail: OBSERVATION
Discharge: HOME | DRG: 069 | End: 2018-11-27
Attending: EMERGENCY MEDICINE | Admitting: HOSPITALIST
Payer: COMMERCIAL

## 2018-11-24 VITALS
OXYGEN SATURATION: 94 % | HEIGHT: 72 IN | WEIGHT: 258.38 LBS | SYSTOLIC BLOOD PRESSURE: 121 MMHG | HEART RATE: 79 BPM | DIASTOLIC BLOOD PRESSURE: 76 MMHG | TEMPERATURE: 97.9 F | BODY MASS INDEX: 35 KG/M2 | RESPIRATION RATE: 18 BRPM

## 2018-11-24 DIAGNOSIS — R47.1 DYSARTHRIA: ICD-10-CM

## 2018-11-24 DIAGNOSIS — R41.0 DELIRIUM: Primary | ICD-10-CM

## 2018-11-24 PROBLEM — R47.01 APHASIA: Status: ACTIVE | Noted: 2018-11-24

## 2018-11-24 LAB
A/G RATIO: 1.4 (ref 1.1–2.2)
ABO/RH: NORMAL
ALBUMIN SERPL-MCNC: 3.8 G/DL (ref 3.4–5)
ALP BLD-CCNC: 108 U/L (ref 40–129)
ALT SERPL-CCNC: 9 U/L (ref 10–40)
ANION GAP SERPL CALCULATED.3IONS-SCNC: 13 MMOL/L (ref 3–16)
ANTIBODY SCREEN: NORMAL
APTT: 41.1 SEC (ref 26–36)
AST SERPL-CCNC: 14 U/L (ref 15–37)
BASOPHILS ABSOLUTE: 0.1 K/UL (ref 0–0.2)
BASOPHILS RELATIVE PERCENT: 1.7 %
BILIRUB SERPL-MCNC: 0.4 MG/DL (ref 0–1)
BUN BLDV-MCNC: 6 MG/DL (ref 7–20)
CALCIUM SERPL-MCNC: 9 MG/DL (ref 8.3–10.6)
CHLORIDE BLD-SCNC: 105 MMOL/L (ref 99–110)
CHOLESTEROL, TOTAL: 109 MG/DL (ref 0–199)
CO2: 22 MMOL/L (ref 21–32)
CREAT SERPL-MCNC: 1 MG/DL (ref 0.9–1.3)
EOSINOPHILS ABSOLUTE: 0.4 K/UL (ref 0–0.6)
EOSINOPHILS RELATIVE PERCENT: 4.3 %
ESTIMATED AVERAGE GLUCOSE: 96.8 MG/DL
GFR AFRICAN AMERICAN: >60
GFR NON-AFRICAN AMERICAN: >60
GLOBULIN: 2.7 G/DL
GLUCOSE BLD-MCNC: 100 MG/DL (ref 70–99)
GLUCOSE BLD-MCNC: 106 MG/DL (ref 70–99)
HBA1C MFR BLD: 5 %
HCT VFR BLD CALC: 40.5 % (ref 40.5–52.5)
HDLC SERPL-MCNC: 51 MG/DL (ref 40–60)
HEMOGLOBIN: 12.9 G/DL (ref 13.5–17.5)
INR BLD: 1.07 (ref 0.86–1.14)
LDL CHOLESTEROL CALCULATED: 47 MG/DL
LITHIUM DOSE AMOUNT: ABNORMAL
LITHIUM LEVEL: 0.4 MMOL/L (ref 0.6–1.2)
LYMPHOCYTES ABSOLUTE: 3.1 K/UL (ref 1–5.1)
LYMPHOCYTES RELATIVE PERCENT: 35.8 %
MCH RBC QN AUTO: 27.8 PG (ref 26–34)
MCHC RBC AUTO-ENTMCNC: 31.9 G/DL (ref 31–36)
MCV RBC AUTO: 87.2 FL (ref 80–100)
MONOCYTES ABSOLUTE: 0.5 K/UL (ref 0–1.3)
MONOCYTES RELATIVE PERCENT: 6.1 %
NEUTROPHILS ABSOLUTE: 4.6 K/UL (ref 1.7–7.7)
NEUTROPHILS RELATIVE PERCENT: 52.1 %
PDW BLD-RTO: 19.8 % (ref 12.4–15.4)
PERFORMED ON: ABNORMAL
PLATELET # BLD: 363 K/UL (ref 135–450)
PMV BLD AUTO: 9 FL (ref 5–10.5)
POTASSIUM SERPL-SCNC: 3.9 MMOL/L (ref 3.5–5.1)
PROTHROMBIN TIME: 12.2 SEC (ref 9.8–13)
RBC # BLD: 4.65 M/UL (ref 4.2–5.9)
SODIUM BLD-SCNC: 140 MMOL/L (ref 136–145)
TOTAL PROTEIN: 6.5 G/DL (ref 6.4–8.2)
TRIGL SERPL-MCNC: 55 MG/DL (ref 0–150)
VLDLC SERPL CALC-MCNC: 11 MG/DL
WBC # BLD: 8.8 K/UL (ref 4–11)

## 2018-11-24 PROCEDURE — 36415 COLL VENOUS BLD VENIPUNCTURE: CPT

## 2018-11-24 PROCEDURE — 80061 LIPID PANEL: CPT

## 2018-11-24 PROCEDURE — 6360000002 HC RX W HCPCS: Performed by: INTERNAL MEDICINE

## 2018-11-24 PROCEDURE — G8980 MOBILITY D/C STATUS: HCPCS

## 2018-11-24 PROCEDURE — 80053 COMPREHEN METABOLIC PANEL: CPT

## 2018-11-24 PROCEDURE — 86850 RBC ANTIBODY SCREEN: CPT

## 2018-11-24 PROCEDURE — 96374 THER/PROPH/DIAG INJ IV PUSH: CPT

## 2018-11-24 PROCEDURE — 70496 CT ANGIOGRAPHY HEAD: CPT

## 2018-11-24 PROCEDURE — 80178 ASSAY OF LITHIUM: CPT

## 2018-11-24 PROCEDURE — G8979 MOBILITY GOAL STATUS: HCPCS

## 2018-11-24 PROCEDURE — 93005 ELECTROCARDIOGRAM TRACING: CPT | Performed by: EMERGENCY MEDICINE

## 2018-11-24 PROCEDURE — 94760 N-INVAS EAR/PLS OXIMETRY 1: CPT

## 2018-11-24 PROCEDURE — 6370000000 HC RX 637 (ALT 250 FOR IP): Performed by: NURSE PRACTITIONER

## 2018-11-24 PROCEDURE — 97530 THERAPEUTIC ACTIVITIES: CPT

## 2018-11-24 PROCEDURE — 6360000004 HC RX CONTRAST MEDICATION: Performed by: INTERNAL MEDICINE

## 2018-11-24 PROCEDURE — 70553 MRI BRAIN STEM W/O & W/DYE: CPT

## 2018-11-24 PROCEDURE — 96372 THER/PROPH/DIAG INJ SC/IM: CPT

## 2018-11-24 PROCEDURE — G8989 SELF CARE D/C STATUS: HCPCS

## 2018-11-24 PROCEDURE — 85025 COMPLETE CBC W/AUTO DIFF WBC: CPT

## 2018-11-24 PROCEDURE — G0378 HOSPITAL OBSERVATION PER HR: HCPCS

## 2018-11-24 PROCEDURE — 85730 THROMBOPLASTIN TIME PARTIAL: CPT

## 2018-11-24 PROCEDURE — 96376 TX/PRO/DX INJ SAME DRUG ADON: CPT

## 2018-11-24 PROCEDURE — 97166 OT EVAL MOD COMPLEX 45 MIN: CPT

## 2018-11-24 PROCEDURE — 70450 CT HEAD/BRAIN W/O DYE: CPT

## 2018-11-24 PROCEDURE — G8978 MOBILITY CURRENT STATUS: HCPCS

## 2018-11-24 PROCEDURE — 86900 BLOOD TYPING SEROLOGIC ABO: CPT

## 2018-11-24 PROCEDURE — 85610 PROTHROMBIN TIME: CPT

## 2018-11-24 PROCEDURE — 2060000000 HC ICU INTERMEDIATE R&B

## 2018-11-24 PROCEDURE — G8987 SELF CARE CURRENT STATUS: HCPCS

## 2018-11-24 PROCEDURE — 99285 EMERGENCY DEPT VISIT HI MDM: CPT

## 2018-11-24 PROCEDURE — 97161 PT EVAL LOW COMPLEX 20 MIN: CPT

## 2018-11-24 PROCEDURE — G8988 SELF CARE GOAL STATUS: HCPCS

## 2018-11-24 PROCEDURE — 6360000002 HC RX W HCPCS: Performed by: NURSE PRACTITIONER

## 2018-11-24 PROCEDURE — 70498 CT ANGIOGRAPHY NECK: CPT

## 2018-11-24 PROCEDURE — A9577 INJ MULTIHANCE: HCPCS | Performed by: INTERNAL MEDICINE

## 2018-11-24 PROCEDURE — 2580000003 HC RX 258: Performed by: NURSE PRACTITIONER

## 2018-11-24 PROCEDURE — 86901 BLOOD TYPING SEROLOGIC RH(D): CPT

## 2018-11-24 PROCEDURE — 93010 ELECTROCARDIOGRAM REPORT: CPT | Performed by: INTERNAL MEDICINE

## 2018-11-24 RX ORDER — LORAZEPAM 2 MG/ML
1 INJECTION INTRAMUSCULAR ONCE
Status: COMPLETED | OUTPATIENT
Start: 2018-11-24 | End: 2018-11-24

## 2018-11-24 RX ADMIN — IOPAMIDOL 75 ML: 755 INJECTION, SOLUTION INTRAVENOUS at 09:42

## 2018-11-24 RX ADMIN — LORAZEPAM 1 MG: 2 INJECTION INTRAMUSCULAR; INTRAVENOUS at 09:32

## 2018-11-24 RX ADMIN — FLUOXETINE HYDROCHLORIDE 20 MG: 20 CAPSULE ORAL at 08:53

## 2018-11-24 RX ADMIN — Medication 10 ML: at 08:53

## 2018-11-24 RX ADMIN — ASPIRIN 81 MG: 81 TABLET, COATED ORAL at 08:53

## 2018-11-24 RX ADMIN — PANTOPRAZOLE SODIUM 40 MG: 40 TABLET, DELAYED RELEASE ORAL at 08:53

## 2018-11-24 RX ADMIN — PANTOPRAZOLE SODIUM 40 MG: 40 TABLET, DELAYED RELEASE ORAL at 16:03

## 2018-11-24 RX ADMIN — LORAZEPAM 1 MG: 2 INJECTION INTRAMUSCULAR; INTRAVENOUS at 10:29

## 2018-11-24 RX ADMIN — ENOXAPARIN SODIUM 40 MG: 40 INJECTION SUBCUTANEOUS at 08:53

## 2018-11-24 RX ADMIN — LITHIUM CARBONATE 300 MG: 300 TABLET, FILM COATED, EXTENDED RELEASE ORAL at 08:53

## 2018-11-24 RX ADMIN — THERA TABS 1 TABLET: TAB at 08:53

## 2018-11-24 RX ADMIN — GADOBENATE DIMEGLUMINE 20 ML: 529 INJECTION, SOLUTION INTRAVENOUS at 11:07

## 2018-11-24 ASSESSMENT — ENCOUNTER SYMPTOMS
SORE THROAT: 0
DIARRHEA: 0
CHEST TIGHTNESS: 0
VOMITING: 0
ABDOMINAL PAIN: 0
CONSTIPATION: 0
NAUSEA: 0
SHORTNESS OF BREATH: 0

## 2018-11-24 ASSESSMENT — PAIN SCALES - GENERAL
PAINLEVEL_OUTOF10: 8
PAINLEVEL_OUTOF10: 0
PAINLEVEL_OUTOF10: 0

## 2018-11-24 ASSESSMENT — PAIN DESCRIPTION - LOCATION: LOCATION: SHOULDER

## 2018-11-24 ASSESSMENT — PAIN DESCRIPTION - PAIN TYPE: TYPE: ACUTE PAIN

## 2018-11-24 ASSESSMENT — PAIN DESCRIPTION - ORIENTATION: ORIENTATION: RIGHT

## 2018-11-24 NOTE — H&P
component separation    ABDOMINAL EXPLORATION SURGERY  1/11/16    exp lap, lysis of adhesions, small bowel resection    CARPAL TUNNEL RELEASE      bilat    DILATATION, ESOPHAGUS      ENDOSCOPY, COLON, DIAGNOSTIC      EYE SURGERY      GASTRIC BYPASS SURGERY  Jan 2009    Has lost about 200 pounds.  HERNIA REPAIR  3-    ventral    JOINT REPLACEMENT      KNEE ARTHROSCOPY Right 7/11/2013    Dr.Robert Sanders     KNEE ARTHROSCOPY Right 7/11/12    RIGHT KNEE ARTHROSCOPY WITH CHONDROPLASTY    KNEE SURGERY  July 2012    right, arthroscopy    LAPAROTOMY      LASIK  2005    OTHER SURGICAL HISTORY Left 03/08/2016    CT biopsy ablasion left kidney    OTHER SURGICAL HISTORY  2016    small intestine 12 inch removed, 2 hernia surgeries, another surgery to drain infection from incision.  SPLENECTOMY      TOTAL KNEE ARTHROPLASTY  10/15/13    RIGHT    UPPER GASTROINTESTINAL ENDOSCOPY  6-7-2016    UPPER GASTROINTESTINAL ENDOSCOPY  04/02/2018       Medications Prior to Admission:      Prior to Admission medications    Medication Sig Start Date End Date Taking? Authorizing Provider   pramipexole (MIRAPEX) 1 MG tablet Take 0.5-1 tablets by mouth nightly 11/21/18  Yes Jacqui GROVES [facility]", APRN - CNP   FLUoxetine (PROZAC) 20 MG capsule TAKE 1 CAPSULE BY MOUTH DAILY 11/6/18  Yes Keith Schafer MD   lithium (LITHOBID) 300 MG extended release tablet TAKE 1 TABLET BY MOUTH TWICE DAILY 11/6/18  Yes Keith Schafer MD   zolpidem (AMBIEN) 10 MG tablet Take 1 tablet by mouth nightly as needed for Sleep for up to 90 days. . 8/27/18 11/25/18 Yes Jacqui Boyd, APRN - CNP   aspirin 81 MG EC tablet Take 81 mg by mouth   Yes Historical Provider, MD   ondansetron (ZOFRAN-ODT) 4 MG disintegrating tablet Take 4 mg by mouth every 8 hours as needed for Nausea  2/5/18  Yes Historical Provider, MD   atorvastatin (LIPITOR) 40 MG tablet TAKE 1 TABLET BY MOUTH EVERY NIGHT AT BEDTIME 6/27/18  Yes Jae Grullon MD midline. Respiratory:  Normal respiratory effort. Clear to auscultation, bilaterally without Rales/Wheezes/Rhonchi. Cardiovascular:  Regular rate and rhythm with normal S1/S2 without murmurs, rubs or gallops. Abdomen: Soft, non-tender, non-distended with normal bowel sounds. Musculoskeletal:  No clubbing, cyanosis or edema bilaterally. Full range of motion without deformity. Skin: Skin color, texture, turgor normal.  No rashes or lesions. Neurologic:  Neurovascularly intact without any focal sensory/motor deficits. Cranial nerves: II-XII intact, grossly non-focal.  Psychiatric:  Alert and oriented, thought content appropriate, normal insight  Capillary Refill: Brisk,< 3 seconds   Peripheral Pulses: +2 palpable, equal bilaterally       Labs:     Recent Labs      11/23/18   1706   WBC  7.0   HGB  13.6   HCT  42.1   PLT  365     Recent Labs      11/23/18   1706   NA  140   K  4.2   CL  105   CO2  25   BUN  4*   CREATININE  1.1   CALCIUM  9.4     Recent Labs      11/23/18   1706   AST  15   ALT  10   BILITOT  0.3   ALKPHOS  120     No results for input(s): INR in the last 72 hours. Recent Labs      11/23/18   1706   TROPONINI  <0.01       Urinalysis:      Lab Results   Component Value Date    NITRU Negative 05/14/2017    WBCUA 2 06/07/2015    RBCUA 1 06/07/2015    BLOODU Negative 05/14/2017    SPECGRAV 1.018 05/14/2017    GLUCOSEU Negative 05/14/2017    GLUCOSEU NEGATIVE 02/07/2011       Radiology:     CXR: I have reviewed the CXR with the following interpretation: NAD  EKG:  I have reviewed the EKG with the following interpretation: Normal sinus rhythm with sinus arrhythmia, rate 64, CA interval 176. No acute ST or T wave abnormality    CT HEAD WO CONTRAST   Preliminary Result   No acute intracranial abnormality. XR CHEST STANDARD (2 VW)   Final Result   No evidence of acute cardiopulmonary disease.              ASSESSMENT:    Active Hospital Problems    Diagnosis Date Noted    Dizziness [R42]

## 2018-11-24 NOTE — PROGRESS NOTES
Discharge instructions reviewed and discussed with patient. Patient verbalized understanding of following up with his primary care physician to have an outpatient echo set up as well as a holter monitor set up. Patient stated he want to walk out with his wife to private vehicle. Heart monitor and IV site removed per protocol, no complications.

## 2018-11-24 NOTE — PROGRESS NOTES
Medication Reconciliation     List of medications patient is currently taking is complete. Source of information:   · Conversation with patient at bedside  · Epic Records    Allergies clarified. Other Medication Information:  · Patient states he received his morning medications prior to arriving in the Emergency Department today. He still needs all evening doses.      Eddy Quiñones, Pharmacy Intern  11/23/18 7:22 PM

## 2018-11-24 NOTE — DISCHARGE SUMMARY
10 10 - 40 U/L    AST 15 15 - 37 U/L    Globulin 2.8 g/dL   Troponin    Collection Time: 11/23/18  5:06 PM   Result Value Ref Range    Troponin <0.01 <0.01 ng/mL   Hemoglobin A1C    Collection Time: 11/23/18  5:06 PM   Result Value Ref Range    Hemoglobin A1C 5.0 See comment %    eAG 96.8 mg/dL   EKG 12 Lead    Collection Time: 11/23/18  5:15 PM   Result Value Ref Range    Ventricular Rate 64 BPM    Atrial Rate 64 BPM    P-R Interval 176 ms    QRS Duration 96 ms    Q-T Interval 400 ms    QTc Calculation (Bazett) 412 ms    P Axis 59 degrees    R Axis 1 degrees    T Axis 9 degrees    Diagnosis       Normal sinus rhythm with sinus arrhythmiaDelayed transitionAbnormal ECGWhen compared with ECG of 23-JUL-2018 08:23,No significant change was foundConfirmed by MAC BROWN, Jayloncolton Landrums ([id number]) on 11/24/2018 8:55:17 AM         Follow up: with Tami Harper MD    Note that over 30 minutes was spent in preparing discharge papers, discussing discharge with patient, medication review, etc.    Thank you Tami Harper MD for the opportunity to be involved in this patient's care. If you have any questions or concerns please feel free to contact me at [phone].     Electronically signed by Chance Cavanaugh MD on 11/24/2018 at 12:28 PM

## 2018-11-24 NOTE — PLAN OF CARE
Problem: HEMODYNAMIC STATUS  Goal: Patient has stable vital signs and fluid balance  Outcome: Ongoing      Problem: ACTIVITY INTOLERANCE/IMPAIRED MOBILITY  Goal: Mobility/activity is maintained at optimum level for patient  Outcome: Ongoing      Problem: COMMUNICATION IMPAIRMENT  Goal: Ability to express needs and understand communication  Outcome: Ongoing

## 2018-11-25 PROBLEM — R41.82 ALTERED MENTAL STATUS: Status: ACTIVE | Noted: 2018-11-25

## 2018-11-25 LAB
LITHIUM DOSE AMOUNT: NORMAL
LITHIUM LEVEL: 0.6 MMOL/L (ref 0.6–1.2)

## 2018-11-25 PROCEDURE — 96372 THER/PROPH/DIAG INJ SC/IM: CPT

## 2018-11-25 PROCEDURE — 2580000003 HC RX 258: Performed by: NURSE PRACTITIONER

## 2018-11-25 PROCEDURE — G8978 MOBILITY CURRENT STATUS: HCPCS

## 2018-11-25 PROCEDURE — G0378 HOSPITAL OBSERVATION PER HR: HCPCS

## 2018-11-25 PROCEDURE — 94760 N-INVAS EAR/PLS OXIMETRY 1: CPT

## 2018-11-25 PROCEDURE — G8980 MOBILITY D/C STATUS: HCPCS

## 2018-11-25 PROCEDURE — 36415 COLL VENOUS BLD VENIPUNCTURE: CPT

## 2018-11-25 PROCEDURE — 6370000000 HC RX 637 (ALT 250 FOR IP): Performed by: NURSE PRACTITIONER

## 2018-11-25 PROCEDURE — 6360000002 HC RX W HCPCS: Performed by: NURSE PRACTITIONER

## 2018-11-25 PROCEDURE — G8979 MOBILITY GOAL STATUS: HCPCS

## 2018-11-25 PROCEDURE — 93010 ELECTROCARDIOGRAM REPORT: CPT | Performed by: INTERNAL MEDICINE

## 2018-11-25 PROCEDURE — 97116 GAIT TRAINING THERAPY: CPT

## 2018-11-25 PROCEDURE — 80178 ASSAY OF LITHIUM: CPT

## 2018-11-25 PROCEDURE — 97162 PT EVAL MOD COMPLEX 30 MIN: CPT

## 2018-11-25 PROCEDURE — 97530 THERAPEUTIC ACTIVITIES: CPT

## 2018-11-25 RX ORDER — ONDANSETRON 2 MG/ML
4 INJECTION INTRAMUSCULAR; INTRAVENOUS EVERY 6 HOURS PRN
Status: DISCONTINUED | OUTPATIENT
Start: 2018-11-25 | End: 2018-11-27 | Stop reason: HOSPADM

## 2018-11-25 RX ORDER — SODIUM CHLORIDE 0.9 % (FLUSH) 0.9 %
10 SYRINGE (ML) INJECTION PRN
Status: DISCONTINUED | OUTPATIENT
Start: 2018-11-25 | End: 2018-11-27 | Stop reason: HOSPADM

## 2018-11-25 RX ORDER — ATORVASTATIN CALCIUM 40 MG/1
40 TABLET, FILM COATED ORAL NIGHTLY
Status: DISCONTINUED | OUTPATIENT
Start: 2018-11-25 | End: 2018-11-27 | Stop reason: HOSPADM

## 2018-11-25 RX ORDER — ASPIRIN 81 MG/1
81 TABLET ORAL DAILY
Status: DISCONTINUED | OUTPATIENT
Start: 2018-11-25 | End: 2018-11-27 | Stop reason: HOSPADM

## 2018-11-25 RX ORDER — PANTOPRAZOLE SODIUM 40 MG/1
40 TABLET, DELAYED RELEASE ORAL
Status: DISCONTINUED | OUTPATIENT
Start: 2018-11-25 | End: 2018-11-27 | Stop reason: HOSPADM

## 2018-11-25 RX ORDER — QUETIAPINE FUMARATE 200 MG/1
200 TABLET, FILM COATED ORAL NIGHTLY
Status: DISCONTINUED | OUTPATIENT
Start: 2018-11-25 | End: 2018-11-27 | Stop reason: HOSPADM

## 2018-11-25 RX ORDER — B-COMPLEX WITH VITAMIN C
2 TABLET ORAL DAILY
Status: DISCONTINUED | OUTPATIENT
Start: 2018-11-25 | End: 2018-11-27 | Stop reason: HOSPADM

## 2018-11-25 RX ORDER — SODIUM CHLORIDE 0.9 % (FLUSH) 0.9 %
10 SYRINGE (ML) INJECTION EVERY 12 HOURS SCHEDULED
Status: DISCONTINUED | OUTPATIENT
Start: 2018-11-25 | End: 2018-11-27 | Stop reason: HOSPADM

## 2018-11-25 RX ORDER — SILDENAFIL CITRATE 20 MG/1
80 TABLET ORAL PRN
Status: DISCONTINUED | OUTPATIENT
Start: 2018-11-25 | End: 2018-11-25

## 2018-11-25 RX ORDER — PRAMIPEXOLE DIHYDROCHLORIDE 0.5 MG/1
1 TABLET ORAL NIGHTLY
Status: DISCONTINUED | OUTPATIENT
Start: 2018-11-25 | End: 2018-11-27 | Stop reason: HOSPADM

## 2018-11-25 RX ORDER — LITHIUM CARBONATE 300 MG/1
300 TABLET, FILM COATED, EXTENDED RELEASE ORAL 2 TIMES DAILY
Status: DISCONTINUED | OUTPATIENT
Start: 2018-11-25 | End: 2018-11-27 | Stop reason: HOSPADM

## 2018-11-25 RX ORDER — FLUOXETINE HYDROCHLORIDE 20 MG/1
20 CAPSULE ORAL DAILY
Status: DISCONTINUED | OUTPATIENT
Start: 2018-11-25 | End: 2018-11-27 | Stop reason: HOSPADM

## 2018-11-25 RX ORDER — ZOLPIDEM TARTRATE 5 MG/1
10 TABLET ORAL NIGHTLY PRN
Status: DISCONTINUED | OUTPATIENT
Start: 2018-11-25 | End: 2018-11-27 | Stop reason: HOSPADM

## 2018-11-25 RX ADMIN — Medication 10 ML: at 21:06

## 2018-11-25 RX ADMIN — ENOXAPARIN SODIUM 40 MG: 40 INJECTION SUBCUTANEOUS at 09:17

## 2018-11-25 RX ADMIN — ASPIRIN 81 MG: 81 TABLET, COATED ORAL at 09:17

## 2018-11-25 RX ADMIN — LITHIUM CARBONATE 300 MG: 300 TABLET, EXTENDED RELEASE ORAL at 21:05

## 2018-11-25 RX ADMIN — ZOLPIDEM TARTRATE 10 MG: 5 TABLET ORAL at 21:05

## 2018-11-25 RX ADMIN — FLUOXETINE 20 MG: 20 CAPSULE ORAL at 09:17

## 2018-11-25 RX ADMIN — PRAMIPEXOLE DIHYDROCHLORIDE 1 MG: 0.5 TABLET ORAL at 21:05

## 2018-11-25 RX ADMIN — QUETIAPINE FUMARATE 200 MG: 200 TABLET ORAL at 21:05

## 2018-11-25 RX ADMIN — LITHIUM CARBONATE 300 MG: 300 TABLET, EXTENDED RELEASE ORAL at 09:17

## 2018-11-25 RX ADMIN — ATORVASTATIN CALCIUM 40 MG: 40 TABLET, FILM COATED ORAL at 21:05

## 2018-11-25 RX ADMIN — CALCIUM CARBONATE-VITAMIN D TAB 500 MG-200 UNIT 2 TABLET: 500-200 TAB at 09:17

## 2018-11-25 RX ADMIN — PANTOPRAZOLE SODIUM 40 MG: 40 TABLET, DELAYED RELEASE ORAL at 17:22

## 2018-11-25 RX ADMIN — PANTOPRAZOLE SODIUM 40 MG: 40 TABLET, DELAYED RELEASE ORAL at 06:03

## 2018-11-25 RX ADMIN — Medication 10 ML: at 09:17

## 2018-11-25 ASSESSMENT — PAIN DESCRIPTION - LOCATION: LOCATION: HEAD

## 2018-11-25 ASSESSMENT — PAIN SCALES - GENERAL
PAINLEVEL_OUTOF10: 0
PAINLEVEL_OUTOF10: 2
PAINLEVEL_OUTOF10: 0

## 2018-11-25 ASSESSMENT — PAIN DESCRIPTION - ONSET: ONSET: ON-GOING

## 2018-11-25 ASSESSMENT — PAIN DESCRIPTION - PROGRESSION: CLINICAL_PROGRESSION: NOT CHANGED

## 2018-11-25 ASSESSMENT — PAIN DESCRIPTION - DESCRIPTORS: DESCRIPTORS: ACHING;CONSTANT;DISCOMFORT;DULL

## 2018-11-25 ASSESSMENT — PAIN DESCRIPTION - ORIENTATION: ORIENTATION: RIGHT

## 2018-11-25 ASSESSMENT — PAIN DESCRIPTION - FREQUENCY: FREQUENCY: CONTINUOUS

## 2018-11-25 ASSESSMENT — PAIN DESCRIPTION - PAIN TYPE: TYPE: CHRONIC PAIN

## 2018-11-25 NOTE — CONSULTS
apparently was quite different according  to the wife. REVIEW OF SYSTEMS:  As above and per chart which has been reviewed by  me. All other remaining review of systems is negative on my  examination. PAST MEDICAL HISTORY:  He has a prior history of stroke as described  above, allergies, arthritis, Vaughn's esophagus, carpal tunnel  syndrome, depression, gastroesophageal reflux disease, chronic headaches  as described above, morbid obesity, movement disorder, onychomycosis,  obstructive sleep apnea. PAST SURGICAL HISTORY:  He has had numerous prior surgeries including  multiple abdominal surgeries with gastric bypass, abdominal exploration  surgery, carpal tunnel release, esophageal dilation, eye surgery of  unclear type, hernia repair, joint replacement, knee arthroscopy on  multiple occasions, LASIK eye surgery, splenectomy, recent nephrectomy,  total knee arthroplasty, etc.    ALLERGIES:  He has a reported allergies to PROCHLORPERAZINE, MORPHINE  and NSAIDs. SOCIAL HISTORY:  He has a history of smoking tobacco for 25 years ago  although only smoked a half a pack a day. There was reported alcohol or  illicit drug usage. FAMILY HISTORY:  He had a grandmother with dementia. There are no other  neurologic diseases or disorders reported in the family. MEDICATIONS:  His current medications listed include Mirapex, Prozac,  lithium, Ambien, aspirin, Zofran, Lipitor, Seroquel, Protonix, calcium  with vitamin D, multivitamin and sildenafil. PHYSICAL EXAMINATION:  VITAL SIGNS:  Temperature 97.9, respiratory rate 18, pulse 79, blood  pressure 121/76. GENERAL:  He appears to be in no acute distress. He appears somewhat  older than stated age. HEENT:  His head is normocephalic and atraumatic. There are no Roman  signs or raccoon eyes noted. NECK:  Supple without nuchal rigidity. There are no carotid or  vertebral bruits auscultated. HEART:  Regular rate and rhythm.   LUNGS:  Clear to auscultation these  images myself. there was a small right middle cerebral artery ischemic  territory infarct. No acute processes identified. Specifically, there  is no evidence of acute ischemic event. IMPRESSION:  3 81-year-old male with episode of aphasia lasting 30 to 45 minutes  in which the etiology is unclear. He did have lightheadedness that  lasted about a day or so. Exact etiology is unclear. The above is  suspicious for possible hypoperfusion resulting in a TIA though an  Amnestic phenomenon of his previous infarct is certainly possible. I also cannot entirely exclude the possibility of a migrainous aura   or seizure though this is felt to be less likely as he has no prior   history of similar events in the past.  2. History of prior stroke. 3. History of chronic headache of which the etiology is not entirely  clear. PLAN:  1. I would certainly recommend that he have the echocardiogram that is  ordered. Unfortunately, this cannot be performed until Monday. This  maybe set up as an outpatient if he is otherwise stable and ready for  discharge. 2. Recommend prolonged cardiac monitor as an outpatient to look for any  evidence of atrial fibrillation or other cardiac abnormalities that  could account for his symptoms. 3. Continue statin agent for secondary stroke prevention. 4. Continue antiplatelet agent for secondary stroke prevention. 5. I would also recommend that he follows up with a stroke specialist  at [facility] as he has had a prior stroke without known etiology and a possible  new ischemic event, again without any definite known etiology. 6. At this point, I am okay with discharge if the patient desires  though I have recommended that he have a close followup with his primary  care provider for the above testing as it would be at least a couple of  more days until the testing can be completed as an inpatient.   If he  would like to remain in the hospital and wait for the above testing, I  would be

## 2018-11-25 NOTE — PLAN OF CARE
Problem: Falls - Risk of:  Goal: Will remain free from falls  Will remain free from falls   Outcome: Ongoing  Problem: Falls - Risk of  Goal: Absence of falls  Outcome: Ongoing  Side rails up x 3. Bed locked and low position. Falling star program remains in place. Call light and personal belongings within reach. Frequent visual monitoring continues. Toileting program inplace. Patient assisted in turning/repositioning at least once every 2 hours, and on a prn basis. Problem: Mental Status - Impaired:  Goal: Mental status will be restored to baseline  Mental status will be restored to baseline  Outcome: Ongoing  Patient alert,oriented x 4,no confusion. Problem: Pain:  Goal: Pain level will decrease  Pain level will decrease  Outcome: Ongoing  Patient was encourage to call this RN if he experiences pain. Numeric scale was used. No pain med was given during this shift. Problem: Skin Integrity - Impaired:  Goal: Will show no infection signs and symptoms  Will show no infection signs and symptoms  Outcome: Ongoing  Patient able to move by himself. Skin care was performed. No signs of new skin injury wasnoted.

## 2018-11-25 NOTE — PROGRESS NOTES
Physical Therapy    Facility/Department: [address] PROGRESSIVE CARE  Initial Assessment/Discharge Summary    NAME: Abel Rust  : 1970  MRN: [id number]    Date of Service: 2018    Discharge Recommendations:  Home with assist PRN   PT Equipment Recommendations  Equipment Needed: No   Abel Rust scored a 24/24 on the AM-PAC short mobility form. At this time, no further PT is recommended upon discharge due to level of independence and assist available. Recommend patient returns to prior setting with prior services. Patient Diagnosis(es): The primary encounter diagnosis was Delirium. A diagnosis of Dysarthria was also pertinent to this visit. has a past medical history of Allergic rhinitis, seasonal; Arthritis; Vaughn's esophagus; Carpal tunnel syndrome; Depression; Depression; GERD (gastroesophageal reflux disease); Headache(784.0); History of blood transfusion; History of tobacco use; Migraine; Morbid obesity ([address] Movement disorder; Onychomycosis; Sleep apnea, obstructive; Unspecified cerebral artery occlusion with cerebral infarction; Wears dentures; and Wears glasses. has a past surgical history that includes Gastric bypass surgery (2009); Splenectomy; LASIK (); knee surgery (2012); Carpal tunnel release; laparotomy; Knee arthroscopy (Right, 2013); Knee arthroscopy (Right, 12); Total knee arthroplasty (10/15/13); Endoscopy, colon, diagnostic; Dilatation, esophagus; eye surgery; joint replacement; Abdominal exploration surgery (16); other surgical history (Left, 2016); hernia repair (3-); Upper gastrointestinal endoscopy (2016); other surgical history (); Upper gastrointestinal endoscopy (2018); Abdomen surgery; and Abdomen surgery (2016).     Restrictions     Vision/Hearing  Vision: Within Functional Limits  Hearing: Within functional limits     Subjective  General  Chart Reviewed: Yes  Patient assessed for rehabilitation services?: Yes  Additional Pertinent Hx: 52 y/o male to ER approx 3 hr after d/c 11/24/18 with Delirium, Dysarthria. CT Head negative. Pt was admit 11/23-11/24/18 with Dizziness, Difficulty Speaking. MRI showed small chronic R MCA Infarct consist with L UE weak (h/o CVA). PMH as noted including CVA (2014, slight L UE weak), Gastric Bypass, Mult Abd Surgs, Hernia Repair, Splenectomy, TKR, CTR. Family / Caregiver Present: No  Referring Practitioner: Marci Jerome NP. Diagnosis: Delirium, Dysarthria. Follows Commands: Within Functional Limits  Subjective  Subjective: Pt agreeable to PT Eval/Rx. Pain Screening  Patient Currently in Pain: Denies  Vital Signs  Patient Currently in Pain: Denies       Orientation  Orientation  Overall Orientation Status: Within Functional Limits  Social/Functional History  Social/Functional History  Lives With: Family (Wife, 2 sons. )  Type of Home: House  Home Layout: Two level, Laundry in basement, Bed/Bath upstairs  Home Access: Stairs to enter with rails  Entrance Stairs - Number of Steps: 2 steps from front; has full flight to 2nd fl  Bathroom Shower/Tub: Walk-in shower  Bathroom Toilet: Standard  Bathroom Accessibility: Accessible  ADL Assistance: Independent  Homemaking Assistance: Independent  Ambulation Assistance: Independent (Without assist device PTA. 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    Cognition        Objective          RLE AROM: WFL  LLE AROM : WFL  RUE AROM : WFL  LUE AROM : WFL    Strength RLE: WFL  Strength LLE: WFL  Strength RUE: WFL  Strength LUE: WFL     Sensation  Overall Sensation Status: WFL  Bed mobility  Rolling to Left: Independent  Rolling to Right: Independent  Supine to Sit: Independent  Sit to Supine: Independent  Transfers  Sit to Stand: Independent  Stand to

## 2018-11-25 NOTE — ED PROVIDER NOTES
Patient has an NIH of 1 on arrival.  Stroke team consult today and CT head negative. No TPA. Stroke team recommends admission for further stroke workup as well as EEG for possible seizures. ED Course as of Nov 24 2350   Sat Nov 24, 2018   8119 Spoke with Radiologist, Dr. Tenisha Martines, to discuss CT head. Negative CT brain. Spoke with stroke physician, Dr. Monserrat Bucio, to discuss patient's symptoms and evaluation yesterday for similar presentation. No TPA. CTA head and neck at this time as patient had one yesterday. She recommends admission for EEG and complete stroke workup if it was not completed yesterday. Concern for possible subclinical seizures. [DS]      ED Course User Index  [DS] Aracelis Concepcion MD         The patient tolerated their visit well. Thepatient and / or the family were informed of the results of any tests, a time was given to answer questions. FINAL IMPRESSION      1. Delirium    2. Dysarthria        DISPOSITION/PLAN   DISPOSITION      Decision to admit    PATIENT REFERRED TO:  No follow-up provider specified.     DISCHARGE MEDICATIONS:  New Prescriptions    No medications on file       DISCONTINUED MEDICATIONS:  Discontinued Medications    No medications on file              (Please note that portions of this note were completed with a voice recognition program.  Efforts were made to edit the dictations but occasionally words aremis-transcribed.)    Aracelis Concepcion MD (electronically signed)            Aracelis Concepcion MD  11/24/18 4405

## 2018-11-25 NOTE — H&P
subclinical seizure  - telemetry    DVT Prophylaxis: Lovenox  Diet: DIET CARDIAC;  Code Status: Full Code    PT/OT Eval Status: pending    [address], APRN - CNP    Thank you Tyron Isidro MD for the opportunity to be involved in this patient's care. If you have any questions or concerns please feel free to contact me at [phone].

## 2018-11-26 ENCOUNTER — TELEPHONE (OUTPATIENT)
Dept: FAMILY MEDICINE CLINIC | Age: 48
End: 2018-11-26

## 2018-11-26 LAB
CHOLESTEROL, TOTAL: 102 MG/DL (ref 0–199)
EKG ATRIAL RATE: 64 BPM
EKG ATRIAL RATE: 80 BPM
EKG DIAGNOSIS: NORMAL
EKG DIAGNOSIS: NORMAL
EKG P AXIS: 58 DEGREES
EKG P AXIS: 59 DEGREES
EKG P-R INTERVAL: 174 MS
EKG P-R INTERVAL: 176 MS
EKG Q-T INTERVAL: 378 MS
EKG Q-T INTERVAL: 400 MS
EKG QRS DURATION: 90 MS
EKG QRS DURATION: 96 MS
EKG QTC CALCULATION (BAZETT): 412 MS
EKG QTC CALCULATION (BAZETT): 435 MS
EKG R AXIS: 1 DEGREES
EKG R AXIS: 56 DEGREES
EKG T AXIS: 55 DEGREES
EKG T AXIS: 9 DEGREES
EKG VENTRICULAR RATE: 64 BPM
EKG VENTRICULAR RATE: 80 BPM
HDLC SERPL-MCNC: 44 MG/DL (ref 40–60)
LDL CHOLESTEROL CALCULATED: 37 MG/DL
LV EF: 58 %
LVEF MODALITY: NORMAL
TRIGL SERPL-MCNC: 107 MG/DL (ref 0–150)
VLDLC SERPL CALC-MCNC: 21 MG/DL

## 2018-11-26 PROCEDURE — 94760 N-INVAS EAR/PLS OXIMETRY 1: CPT

## 2018-11-26 PROCEDURE — 2580000003 HC RX 258: Performed by: NURSE PRACTITIONER

## 2018-11-26 PROCEDURE — G0378 HOSPITAL OBSERVATION PER HR: HCPCS

## 2018-11-26 PROCEDURE — 6360000002 HC RX W HCPCS: Performed by: NURSE PRACTITIONER

## 2018-11-26 PROCEDURE — G8987 SELF CARE CURRENT STATUS: HCPCS

## 2018-11-26 PROCEDURE — C8929 TTE W OR WO FOL WCON,DOPPLER: HCPCS

## 2018-11-26 PROCEDURE — 6370000000 HC RX 637 (ALT 250 FOR IP): Performed by: NURSE PRACTITIONER

## 2018-11-26 PROCEDURE — G8988 SELF CARE GOAL STATUS: HCPCS

## 2018-11-26 PROCEDURE — G8989 SELF CARE D/C STATUS: HCPCS

## 2018-11-26 PROCEDURE — 96372 THER/PROPH/DIAG INJ SC/IM: CPT

## 2018-11-26 PROCEDURE — 80061 LIPID PANEL: CPT

## 2018-11-26 PROCEDURE — 36415 COLL VENOUS BLD VENIPUNCTURE: CPT

## 2018-11-26 PROCEDURE — 97165 OT EVAL LOW COMPLEX 30 MIN: CPT

## 2018-11-26 PROCEDURE — 95819 EEG AWAKE AND ASLEEP: CPT

## 2018-11-26 RX ADMIN — FLUOXETINE 20 MG: 20 CAPSULE ORAL at 09:07

## 2018-11-26 RX ADMIN — Medication 10 ML: at 09:07

## 2018-11-26 RX ADMIN — PRAMIPEXOLE DIHYDROCHLORIDE 1 MG: 0.5 TABLET ORAL at 20:28

## 2018-11-26 RX ADMIN — Medication 10 ML: at 20:30

## 2018-11-26 RX ADMIN — PANTOPRAZOLE SODIUM 40 MG: 40 TABLET, DELAYED RELEASE ORAL at 17:51

## 2018-11-26 RX ADMIN — QUETIAPINE FUMARATE 200 MG: 200 TABLET ORAL at 20:28

## 2018-11-26 RX ADMIN — ZOLPIDEM TARTRATE 10 MG: 5 TABLET ORAL at 20:43

## 2018-11-26 RX ADMIN — ASPIRIN 81 MG: 81 TABLET, COATED ORAL at 09:07

## 2018-11-26 RX ADMIN — CALCIUM CARBONATE-VITAMIN D TAB 500 MG-200 UNIT 2 TABLET: 500-200 TAB at 09:07

## 2018-11-26 RX ADMIN — ATORVASTATIN CALCIUM 40 MG: 40 TABLET, FILM COATED ORAL at 20:28

## 2018-11-26 RX ADMIN — ENOXAPARIN SODIUM 40 MG: 40 INJECTION SUBCUTANEOUS at 09:07

## 2018-11-26 RX ADMIN — PANTOPRAZOLE SODIUM 40 MG: 40 TABLET, DELAYED RELEASE ORAL at 09:07

## 2018-11-26 RX ADMIN — LITHIUM CARBONATE 300 MG: 300 TABLET, EXTENDED RELEASE ORAL at 09:09

## 2018-11-26 RX ADMIN — LITHIUM CARBONATE 300 MG: 300 TABLET, EXTENDED RELEASE ORAL at 20:43

## 2018-11-26 NOTE — PROGRESS NOTES
Occupational Therapy   Occupational Therapy Initial Assessment and D/C Summary   Date: 2018   Patient Name: Ansley Frost  MRN: [id number]     : 1970    Date of Service: 2018    Discharge Recommendations:Davis Brenner scored a 24/ on the AM-[facility] ADL Inpatient form. At this time, no further OT is recommended upon discharge. Recommend patient returns to prior setting with prior services. Home with assist PRN  OT Equipment Recommendations  Equipment Needed: No      Patient Diagnosis(es): The primary encounter diagnosis was Delirium. A diagnosis of Dysarthria was also pertinent to this visit. has a past medical history of Allergic rhinitis, seasonal; Arthritis; Vaughn's esophagus; Carpal tunnel syndrome; Depression; Depression; GERD (gastroesophageal reflux disease); Headache(784.0); History of blood transfusion; History of tobacco use; Migraine; Morbid obesity ([address] Movement disorder; Onychomycosis; Sleep apnea, obstructive; Unspecified cerebral artery occlusion with cerebral infarction; Wears dentures; and Wears glasses. has a past surgical history that includes Gastric bypass surgery (2009); Splenectomy; LASIK (); knee surgery (2012); Carpal tunnel release; laparotomy; Knee arthroscopy (Right, 2013); Knee arthroscopy (Right, 12); Total knee arthroplasty (10/15/13); Endoscopy, colon, diagnostic; Dilatation, esophagus; eye surgery; joint replacement; Abdominal exploration surgery (16); other surgical history (Left, 2016); hernia repair (3-); Upper gastrointestinal endoscopy (2016); other surgical history (); Upper gastrointestinal endoscopy (2018); Abdomen surgery; and Abdomen surgery (2016).          Subjective   General  Chart Reviewed: Yes  Patient assessed for rehabilitation services?: Yes  Additional Pertinent Hx: Patient is a 53 y.o. male who presents d/t confusion after recently d/c'd from hospital after MRI indicated

## 2018-11-26 NOTE — CONSULTS
Neurology Consult - 11/25/18    Impression:  3. 52 yo male with episodes of confusion and speech abnormality. Etiology remains unclear. Sx suggestive of possible complex partial seizures. Cannot exclude migraine though less likely. Other etiologies considered. 2. Hx of prior right MCA stroke. 3. Hx of chronic headache. 4. Tremor.     Plan:  1. Will order EEG. 2. Will order echo. 3. Will likely start on AED pending above workup. 4. Will check lithium level. 5. Continue antiplatelet for secondary stroke prevention. 6. Continue statin agent for secondary stroke prevention. Goal LDL<70.  7. Seizure precautions discussed with patient (see below). 8. Still may benefit from 30-day prolonged cardiac event monitor. May be set up as outpatient. 9. Above d/w patient at length. 10. Neurology will follow. 11. See dictated note. #[id number]    -  Seizure precautions:   1. No driving for minimum of 3 months seizure-free. Familiarize yourself with other state regulations if you drive out of state as other states may have different laws regarding driving restrictions after a seizure. 2. No climbing heights  3. No operating heavy machinery  4. No swimming or bathing unattended  5. No 1:1 childcare with a child too young to call 911 in case of emergency  6. Refrain from any other activities that would put you or others at risk if you were to have another seizure        --  Thank you for allowing me to participate in the care of your patient with high level of medical complexity requiring a high level of medical decision making. If I can be of any further assistance, please do not hesitate to contact me. Regan Porras, DO  [address] [place] [address] [address] Neuroscience
precautions which include no  climbing heights, no operating of heavy machinery, no swimming or  bathing unattended, no one-on-one childcare with a child too young to  call 911 in the case of an emergency and the importance of refraining  from any other activities that would put him or others at risk if he  were to have another seizure. 8.  He still may benefit from a 30-day prolonged cardiac event monitor. This certainly maybe set up prior to discharge or as an outpatient. 9.  The above was discussed with the patient at length. 10.  Neurology will follow. Thank you for allowing me to participate in the care of this patient. If I could be of any further assistance, please do not hesitate to  contact me.         ARCADIO CANTU DO    D: 11/25/2018 14:56:08       T: 11/25/2018 20:50:11     J [email or web address]  Job#: [id number]     Doc#: [id number]    CC:

## 2018-11-27 ENCOUNTER — TELEPHONE (OUTPATIENT)
Dept: FAMILY MEDICINE CLINIC | Age: 48
End: 2018-11-27

## 2018-11-27 VITALS
OXYGEN SATURATION: 96 % | HEART RATE: 72 BPM | RESPIRATION RATE: 16 BRPM | DIASTOLIC BLOOD PRESSURE: 76 MMHG | HEIGHT: 72 IN | TEMPERATURE: 98.4 F | SYSTOLIC BLOOD PRESSURE: 122 MMHG | BODY MASS INDEX: 34.55 KG/M2 | WEIGHT: 255.07 LBS

## 2018-11-27 PROCEDURE — 2580000003 HC RX 258: Performed by: NURSE PRACTITIONER

## 2018-11-27 PROCEDURE — 96372 THER/PROPH/DIAG INJ SC/IM: CPT

## 2018-11-27 PROCEDURE — 94760 N-INVAS EAR/PLS OXIMETRY 1: CPT

## 2018-11-27 PROCEDURE — G0378 HOSPITAL OBSERVATION PER HR: HCPCS

## 2018-11-27 PROCEDURE — 6360000002 HC RX W HCPCS: Performed by: NURSE PRACTITIONER

## 2018-11-27 PROCEDURE — 6370000000 HC RX 637 (ALT 250 FOR IP): Performed by: NURSE PRACTITIONER

## 2018-11-27 RX ORDER — PANTOPRAZOLE SODIUM 40 MG/1
40 TABLET, DELAYED RELEASE ORAL DAILY
Qty: 30 TABLET | Refills: 3 | Status: SHIPPED | OUTPATIENT
Start: 2018-11-27 | End: 2020-02-28

## 2018-11-27 RX ADMIN — Medication 10 ML: at 08:31

## 2018-11-27 RX ADMIN — CALCIUM CARBONATE-VITAMIN D TAB 500 MG-200 UNIT 2 TABLET: 500-200 TAB at 08:30

## 2018-11-27 RX ADMIN — ENOXAPARIN SODIUM 40 MG: 40 INJECTION SUBCUTANEOUS at 08:30

## 2018-11-27 RX ADMIN — PANTOPRAZOLE SODIUM 40 MG: 40 TABLET, DELAYED RELEASE ORAL at 06:20

## 2018-11-27 RX ADMIN — ASPIRIN 81 MG: 81 TABLET, COATED ORAL at 08:30

## 2018-11-27 RX ADMIN — FLUOXETINE 20 MG: 20 CAPSULE ORAL at 08:30

## 2018-11-27 RX ADMIN — LITHIUM CARBONATE 300 MG: 300 TABLET, EXTENDED RELEASE ORAL at 08:30

## 2018-11-27 ASSESSMENT — PAIN SCALES - GENERAL
PAINLEVEL_OUTOF10: 0

## 2018-11-27 NOTE — DISCHARGE INSTR - COC
arthroscopy    LAPAROTOMY      LASIK  2005    OTHER SURGICAL HISTORY Left 03/08/2016    CT biopsy ablasion left kidney    OTHER SURGICAL HISTORY  2016    small intestine 12 inch removed, 2 hernia surgeries, another surgery to drain infection from incision.  SPLENECTOMY      TOTAL KNEE ARTHROPLASTY  10/15/13    RIGHT    UPPER GASTROINTESTINAL ENDOSCOPY  6-7-2016    UPPER GASTROINTESTINAL ENDOSCOPY  04/02/2018       Immunization History:   Immunization History   Administered Date(s) Administered    HIB PRP-T (ActHIB, Hiberix) 09/13/2010    Hib, unspecified formulation 02/09/2015    Influenza Virus Vaccine 02/03/2018    Influenza Whole 10/04/2012    Influenza, Intradermal, Preservative free 10/28/2014    Influenza, Intradermal, Quadrivalent, Preservative Free 12/13/2016    Influenza, Quadv, Recombinant, IM PF (Flublok 18 yrs and older) 10/15/2018    Meningococcal MCV4P (Menactra) 02/09/2015    Meningococcal MPSV4 (Menomune) 01/15/2009    Meningococcal Vaccine, unspecified formulation 01/15/2009    Pneumococcal 13-valent Conjugate (Nkdhcoo21) 02/09/2015    Pneumococcal Polysaccharide (Fstncjcor23) 01/15/2009, 10/09/2013    Td 01/01/2007    Tdap (Boostrix, Adacel) 01/01/2007, 05/03/2014       Active Problems:  Patient Active Problem List   Diagnosis Code    Insomnia-chronic intermittent--uses prn ambien--to be filled by dr Avelina Rodriguez (sleep dr) Odilia Urbano    Vaughn esophagus-on daily ppi-last egd 5/15--dr arce K22.70    History of tobacco useadvised to quit- quit 7/1/2014 Z87.891    Allergic rhinitis, seasonal J30.2    Obstructive sleep apnea,Severe -(on cpap) G47.33     Morbid Obesity-s/p rou en y-2009 E66.01    Depression-on daily effexor xr--sees dr Elizabet Navarrete F32.9    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  Z90.81    Chondromalacia of patellofemoral joint M22.40    Chronic pain syndrome G89.4    History of CVA with residual deficit

## 2018-11-27 NOTE — PLAN OF CARE
Problem: Falls - Risk of:  Goal: Absence of physical injury  Absence of physical injury   Outcome: Ongoing  Fall risk assessment completed every shift. All precautions in place. Pt has call light within reach at all times. Room clear of clutter. Pt aware to call for assistance when getting up.

## 2018-11-28 NOTE — DISCHARGE SUMMARY
light.  Extra ocular muscles intact. Conjunctivae/corneas clear. Neck: Supple, with full range of motion. No jugular venous distention. Trachea midline. Respiratory:  Normal respiratory effort. Clear to auscultation, bilaterally without Rales/Wheezes/Rhonchi. Cardiovascular:  Regular rate and rhythm with normal S1/S2 without murmurs, rubs or gallops. Abdomen: Soft, non-tender, non-distended with normal bowel sounds. Musculoskeletal:  No clubbing, cyanosis or edema bilaterally.  Full range of motion without deformity. Skin: Skin color, texture, turgor normal.  No rashes or lesions. Neurologic:  Neurovascularly intact without any focal sensory/motor deficits. Cranial nerves: II-XII intact, grossly non-focal.  Psychiatric:  Alert and oriented, thought content appropriate, normal insight  Capillary Refill: Brisk,< 3 seconds   Peripheral Pulses: +2 palpable, equal bilaterally    Labs: For convenience and continuity at follow-up the following most recent labs are provided:      CBC:    Lab Results   Component Value Date    WBC 8.8 11/24/2018    HGB 12.9 11/24/2018    HCT 40.5 11/24/2018     11/24/2018       Renal:    Lab Results   Component Value Date     11/24/2018    K 3.9 11/24/2018    K 4.2 11/23/2018     11/24/2018    CO2 22 11/24/2018    BUN 6 11/24/2018    CREATININE 1.0 11/24/2018    CALCIUM 9.0 11/24/2018    PHOS 3.4 01/19/2016         Significant Diagnostic Studies    Radiology:   CT Head WO Contrast   Final Result   No acute intracranial abnormality. Findings were discussed with Dr. Katt Ching at 11:16pm on 11/24/2018.                 Consults:     IP CONSULT TO NEUROLOGY    Disposition:  home     Condition at Discharge: Stable    Discharge Instructions/Follow-up:  PCP in 1 week    Code Status:  Prior     Activity: activity as tolerated    Diet: regular diet      Discharge Medications:     Discharge Medication List as of 11/27/2018  1:35 PM           Details   pantoprazole (PROTONIX)

## 2018-12-03 ENCOUNTER — OFFICE VISIT (OUTPATIENT)
Dept: FAMILY MEDICINE CLINIC | Age: 48
End: 2018-12-03
Payer: COMMERCIAL

## 2018-12-03 VITALS
BODY MASS INDEX: 35.67 KG/M2 | SYSTOLIC BLOOD PRESSURE: 122 MMHG | DIASTOLIC BLOOD PRESSURE: 74 MMHG | WEIGHT: 263 LBS | RESPIRATION RATE: 12 BRPM | TEMPERATURE: 98.5 F | HEART RATE: 72 BPM

## 2018-12-03 DIAGNOSIS — I69.30 HISTORY OF CVA WITH RESIDUAL DEFICIT: ICD-10-CM

## 2018-12-03 DIAGNOSIS — R40.4 ALTERED SENSORIUM: Primary | ICD-10-CM

## 2018-12-03 PROBLEM — R42 DIZZINESS: Status: RESOLVED | Noted: 2018-11-23 | Resolved: 2018-12-03

## 2018-12-03 PROBLEM — R47.01 APHASIA: Status: RESOLVED | Noted: 2018-11-24 | Resolved: 2018-12-03

## 2018-12-03 PROBLEM — R41.82 ALTERED MENTAL STATUS: Status: RESOLVED | Noted: 2018-11-25 | Resolved: 2018-12-03

## 2018-12-03 PROCEDURE — 99495 TRANSJ CARE MGMT MOD F2F 14D: CPT | Performed by: FAMILY MEDICINE

## 2018-12-03 PROCEDURE — 1111F DSCHRG MED/CURRENT MED MERGE: CPT | Performed by: FAMILY MEDICINE

## 2018-12-03 ASSESSMENT — ENCOUNTER SYMPTOMS
GASTROINTESTINAL NEGATIVE: 1
RESPIRATORY NEGATIVE: 1
ALLERGIC/IMMUNOLOGIC NEGATIVE: 1
EYES NEGATIVE: 1

## 2018-12-07 DIAGNOSIS — J30.2 SEASONAL ALLERGIC RHINITIS, UNSPECIFIED TRIGGER: ICD-10-CM

## 2018-12-07 DIAGNOSIS — G47.09 OTHER INSOMNIA: ICD-10-CM

## 2018-12-07 DIAGNOSIS — K21.00 GASTROESOPHAGEAL REFLUX DISEASE WITH ESOPHAGITIS: ICD-10-CM

## 2018-12-07 DIAGNOSIS — G47.33 OBSTRUCTIVE SLEEP APNEA: Primary | ICD-10-CM

## 2018-12-18 ENCOUNTER — OFFICE VISIT (OUTPATIENT)
Dept: PSYCHIATRY | Age: 48
End: 2018-12-18
Payer: COMMERCIAL

## 2018-12-18 VITALS
BODY MASS INDEX: 36.16 KG/M2 | HEART RATE: 75 BPM | DIASTOLIC BLOOD PRESSURE: 80 MMHG | WEIGHT: 267 LBS | SYSTOLIC BLOOD PRESSURE: 128 MMHG | HEIGHT: 72 IN

## 2018-12-18 DIAGNOSIS — F32.A DEPRESSION, UNSPECIFIED DEPRESSION TYPE: Primary | ICD-10-CM

## 2018-12-18 DIAGNOSIS — F32.A DEPRESSION, UNSPECIFIED DEPRESSION TYPE: ICD-10-CM

## 2018-12-18 LAB
ANION GAP SERPL CALCULATED.3IONS-SCNC: 11 MMOL/L (ref 3–16)
BUN BLDV-MCNC: 7 MG/DL (ref 7–20)
CALCIUM SERPL-MCNC: 9.7 MG/DL (ref 8.3–10.6)
CHLORIDE BLD-SCNC: 103 MMOL/L (ref 99–110)
CO2: 28 MMOL/L (ref 21–32)
CREAT SERPL-MCNC: 1.2 MG/DL (ref 0.9–1.3)
GFR AFRICAN AMERICAN: >60
GFR NON-AFRICAN AMERICAN: >60
GLUCOSE BLD-MCNC: 77 MG/DL (ref 70–99)
POTASSIUM SERPL-SCNC: 5.3 MMOL/L (ref 3.5–5.1)
SODIUM BLD-SCNC: 142 MMOL/L (ref 136–145)
TSH REFLEX: 0.85 UIU/ML (ref 0.27–4.2)

## 2018-12-18 PROCEDURE — 99214 OFFICE O/P EST MOD 30 MIN: CPT | Performed by: PSYCHIATRY & NEUROLOGY

## 2018-12-18 RX ORDER — LITHIUM CARBONATE 300 MG/1
TABLET, FILM COATED, EXTENDED RELEASE ORAL
Qty: 180 TABLET | Refills: 0 | Status: SHIPPED | OUTPATIENT
Start: 2018-12-18 | End: 2019-03-26 | Stop reason: SDUPTHER

## 2018-12-18 RX ORDER — QUETIAPINE FUMARATE 200 MG/1
200 TABLET, FILM COATED ORAL NIGHTLY
Qty: 90 TABLET | Refills: 1 | Status: SHIPPED | OUTPATIENT
Start: 2018-12-18 | End: 2019-03-11

## 2018-12-18 RX ORDER — FLUOXETINE HYDROCHLORIDE 20 MG/1
CAPSULE ORAL
Qty: 90 CAPSULE | Refills: 0 | Status: SHIPPED | OUTPATIENT
Start: 2018-12-18 | End: 2019-03-26 | Stop reason: SDUPTHER

## 2018-12-18 NOTE — PROGRESS NOTES
(SEROQUEL) 400 MG tablet Take 0.5 tablets by mouth nightly 90 tablet 0    sildenafil (REVATIO) 20 MG tablet Take 4 tablets by mouth as needed (30 min prior to intercourse) 30 tablet 5    calcium-vitamin D (OSCAL 500/200 D-3) 500-200 MG-UNIT per tablet Take 1 tablet by mouth 2 times daily       Multiple Vitamin TABS Take 1 tablet by mouth daily        No current facility-administered medications for this visit. MRI OF THE BRAIN WITHOUT AND WITH CONTRAST  11/24/2018 11:08 am       TECHNIQUE:   Multiplanar multisequence MRI of the head/brain was performed without and   with the administration of intravenous contrast.       COMPARISON:   06/01/2015       HISTORY:   ORDERING SYSTEM PROVIDED HISTORY: TIA   TECHNOLOGIST PROVIDED HISTORY:   Ordering Physician Provided Reason for Exam: 53 y.o. male with PMHx of   Arthritis, Depression, CVA, GERD, Sleep apnea and movement disorder presented   to [facility] with dizziness and difficulty speaking for the last 2 days.  Pt   states the dizziness has been fairly constant but worsens at times. Anamaria Hong   denies syncope.  Pt has struggled to find words at times today. Anamaria Hong tells me   he knows what he wants to say but cant get the words out right.  Family has   also noted the patient with very off the wall statements and/or answers to   their questions.  Pt has not recently been ill. Anamaria Hong denies current fever,   chills, cough or body aches.  No headache. Anamaria Hong has history of CVA in the past   4 years ago and family was concerned off recurrence. Acuity: Unknown   Type of Exam: Unknown       FINDINGS:   INTRACRANIAL STRUCTURES/VENTRICLES:       The examination is motion degraded.       There is no acute infarct. Anjel Jah is a small chronic area of infarction in   the right insula and frontal lobe.  No mass effect or midline shift.  No   evidence of an acute intracranial hemorrhage.  The ventricles and sulci are   normal in size and configuration.  The sellar/suprasellar regions appear unremarkable.  The normal signal voids within the major intracranial vessels   appear maintained.  No abnormal focus of enhancement is seen within the brain.       ORBITS: The visualized portion of the orbits demonstrate no acute abnormality.       SINUSES: The visualized paranasal sinuses and mastoid air cells are well   aerated.       BONES/SOFT TISSUES: The bone marrow signal intensity appears normal. The soft   tissues demonstrate no acute abnormality.           Impression   No acute abnormality.       Small chronic right MCA territory infarct. ROS:  No tremor, gait nl    MSE:  A-casually dressed, good EC, pleasant and engageable  A-full  M-euthymic  S-grossly A + O  I/J-fair/fair  T-linear, goal-directed. Speech c a better tone. No S/H I, no A/v H.       Assessment:         53 y. o. M c       1. Depression NOS   Plan:         1. Depression NOS-Stable!  Seroquel 200, Li 300 bid, prozac 20 qd. We've discussed, now that he's been doing so well for so long, and has only 1 functioning kidney, whether Li+ is worth the risk at this point. He'll consider this for next time. Bmp, Li+, lipids, hba1c all looked good in November. We'll defer repeats of that for now. I'll check a bmp again and tsh. R/B/A d/w pt including wt gain, metabolic trouble, worsening mood, HA, damage to kidney, thyroid. Pt may need more help c sleep, we'll consider that after he checks his cpap titration tomorrow.       2. EtOH dep-sober x 2 years! Does not want further psychopharm.

## 2018-12-19 ENCOUNTER — HOSPITAL ENCOUNTER (OUTPATIENT)
Dept: SLEEP CENTER | Age: 48
Discharge: HOME | End: 2018-12-19
Payer: COMMERCIAL

## 2018-12-19 DIAGNOSIS — G47.33 OBSTRUCTIVE SLEEP APNEA: ICD-10-CM

## 2018-12-19 DIAGNOSIS — G47.09 OTHER INSOMNIA: ICD-10-CM

## 2018-12-19 PROCEDURE — 95811 POLYSOM 6/>YRS CPAP 4/> PARM: CPT | Performed by: INTERNAL MEDICINE

## 2018-12-19 PROCEDURE — 95811 POLYSOM 6/>YRS CPAP 4/> PARM: CPT

## 2019-01-02 ENCOUNTER — PATIENT MESSAGE (OUTPATIENT)
Dept: PULMONOLOGY | Age: 49
End: 2019-01-02

## 2019-03-01 ENCOUNTER — OFFICE VISIT (OUTPATIENT)
Dept: PULMONOLOGY | Age: 49
End: 2019-03-01
Payer: COMMERCIAL

## 2019-03-01 VITALS
BODY MASS INDEX: 37.52 KG/M2 | SYSTOLIC BLOOD PRESSURE: 128 MMHG | DIASTOLIC BLOOD PRESSURE: 72 MMHG | WEIGHT: 277 LBS | HEART RATE: 82 BPM | HEIGHT: 72 IN | OXYGEN SATURATION: 99 %

## 2019-03-01 DIAGNOSIS — G47.33 OBSTRUCTIVE SLEEP APNEA: Primary | Chronic | ICD-10-CM

## 2019-03-01 DIAGNOSIS — J30.2 SEASONAL ALLERGIC RHINITIS, UNSPECIFIED TRIGGER: Chronic | ICD-10-CM

## 2019-03-01 DIAGNOSIS — G47.33 OBSTRUCTIVE SLEEP APNEA: Chronic | ICD-10-CM

## 2019-03-01 DIAGNOSIS — E66.2 CLASS 2 OBESITY WITH ALVEOLAR HYPOVENTILATION WITHOUT SERIOUS COMORBIDITY WITH BODY MASS INDEX (BMI) OF 37.0 TO 37.9 IN ADULT (HCC): ICD-10-CM

## 2019-03-01 DIAGNOSIS — K21.00 GASTROESOPHAGEAL REFLUX DISEASE WITH ESOPHAGITIS: ICD-10-CM

## 2019-03-01 DIAGNOSIS — F51.04 PSYCHOPHYSIOLOGICAL INSOMNIA: Chronic | ICD-10-CM

## 2019-03-01 PROCEDURE — 99214 OFFICE O/P EST MOD 30 MIN: CPT | Performed by: NURSE PRACTITIONER

## 2019-03-01 RX ORDER — ZOLPIDEM TARTRATE 10 MG/1
10 TABLET ORAL NIGHTLY PRN
Qty: 90 TABLET | Refills: 1 | Status: SHIPPED | OUTPATIENT
Start: 2019-03-01 | End: 2019-05-30

## 2019-03-01 ASSESSMENT — ENCOUNTER SYMPTOMS
SHORTNESS OF BREATH: 0
COUGH: 0
APNEA: 0
EYE PAIN: 0
SINUS PRESSURE: 0
ABDOMINAL DISTENTION: 0
RHINORRHEA: 0
ABDOMINAL PAIN: 0
EYE REDNESS: 0

## 2019-03-01 ASSESSMENT — SLEEP AND FATIGUE QUESTIONNAIRES
HOW LIKELY ARE YOU TO NOD OFF OR FALL ASLEEP WHILE SITTING QUIETLY AFTER LUNCH WITHOUT ALCOHOL: 0
ESS TOTAL SCORE: 1
HOW LIKELY ARE YOU TO NOD OFF OR FALL ASLEEP WHEN YOU ARE A PASSENGER IN A CAR FOR AN HOUR WITHOUT A BREAK: 0
HOW LIKELY ARE YOU TO NOD OFF OR FALL ASLEEP WHILE SITTING AND READING: 0
HOW LIKELY ARE YOU TO NOD OFF OR FALL ASLEEP WHILE SITTING INACTIVE IN A PUBLIC PLACE: 0
HOW LIKELY ARE YOU TO NOD OFF OR FALL ASLEEP WHILE LYING DOWN TO REST IN THE AFTERNOON WHEN CIRCUMSTANCES PERMIT: 1
HOW LIKELY ARE YOU TO NOD OFF OR FALL ASLEEP IN A CAR, WHILE STOPPED FOR A FEW MINUTES IN TRAFFIC: 0
HOW LIKELY ARE YOU TO NOD OFF OR FALL ASLEEP WHILE SITTING AND TALKING TO SOMEONE: 0
HOW LIKELY ARE YOU TO NOD OFF OR FALL ASLEEP WHILE WATCHING TV: 0

## 2019-03-07 ENCOUNTER — TELEPHONE (OUTPATIENT)
Dept: FAMILY MEDICINE CLINIC | Age: 49
End: 2019-03-07

## 2019-03-11 RX ORDER — OLANZAPINE 10 MG/1
10 TABLET ORAL NIGHTLY
Qty: 30 TABLET | Refills: 3 | Status: SHIPPED | OUTPATIENT
Start: 2019-03-11 | End: 2019-03-26

## 2019-03-26 ENCOUNTER — OFFICE VISIT (OUTPATIENT)
Dept: PSYCHIATRY | Age: 49
End: 2019-03-26
Payer: COMMERCIAL

## 2019-03-26 VITALS
WEIGHT: 277 LBS | RESPIRATION RATE: 18 BRPM | SYSTOLIC BLOOD PRESSURE: 120 MMHG | DIASTOLIC BLOOD PRESSURE: 80 MMHG | HEART RATE: 75 BPM | BODY MASS INDEX: 37.57 KG/M2

## 2019-03-26 DIAGNOSIS — F32.A DEPRESSION, UNSPECIFIED DEPRESSION TYPE: Primary | ICD-10-CM

## 2019-03-26 DIAGNOSIS — F32.A DEPRESSION, UNSPECIFIED DEPRESSION TYPE: ICD-10-CM

## 2019-03-26 LAB
ANION GAP SERPL CALCULATED.3IONS-SCNC: 12 MMOL/L (ref 3–16)
BUN BLDV-MCNC: 8 MG/DL (ref 7–20)
CALCIUM SERPL-MCNC: 9.1 MG/DL (ref 8.3–10.6)
CHLORIDE BLD-SCNC: 104 MMOL/L (ref 99–110)
CHOLESTEROL, TOTAL: 120 MG/DL (ref 0–199)
CO2: 25 MMOL/L (ref 21–32)
CREAT SERPL-MCNC: 1.1 MG/DL (ref 0.9–1.3)
GFR AFRICAN AMERICAN: >60
GFR NON-AFRICAN AMERICAN: >60
GLUCOSE BLD-MCNC: 102 MG/DL (ref 70–99)
HDLC SERPL-MCNC: 47 MG/DL (ref 40–60)
LDL CHOLESTEROL CALCULATED: 49 MG/DL
LITHIUM DOSE AMOUNT: ABNORMAL
LITHIUM LEVEL: <0.1 MMOL/L (ref 0.6–1.2)
POTASSIUM SERPL-SCNC: 4.8 MMOL/L (ref 3.5–5.1)
SODIUM BLD-SCNC: 141 MMOL/L (ref 136–145)
TRIGL SERPL-MCNC: 122 MG/DL (ref 0–150)
TSH REFLEX: 1.72 UIU/ML (ref 0.27–4.2)
VLDLC SERPL CALC-MCNC: 24 MG/DL

## 2019-03-26 PROCEDURE — 99214 OFFICE O/P EST MOD 30 MIN: CPT | Performed by: PSYCHIATRY & NEUROLOGY

## 2019-03-26 RX ORDER — LITHIUM CARBONATE 300 MG/1
300 TABLET, FILM COATED, EXTENDED RELEASE ORAL 2 TIMES DAILY
Qty: 60 TABLET | Refills: 0 | Status: SHIPPED | OUTPATIENT
Start: 2019-03-26 | End: 2019-07-31

## 2019-03-26 RX ORDER — FLUOXETINE HYDROCHLORIDE 20 MG/1
CAPSULE ORAL
Qty: 90 CAPSULE | Refills: 0 | Status: SHIPPED | OUTPATIENT
Start: 2019-03-26 | End: 2019-07-03 | Stop reason: SDUPTHER

## 2019-03-27 LAB
ESTIMATED AVERAGE GLUCOSE: 99.7 MG/DL
HBA1C MFR BLD: 5.1 %

## 2019-03-28 ENCOUNTER — TELEPHONE (OUTPATIENT)
Dept: PULMONOLOGY | Age: 49
End: 2019-03-28

## 2019-06-12 ENCOUNTER — PATIENT MESSAGE (OUTPATIENT)
Dept: FAMILY MEDICINE CLINIC | Age: 49
End: 2019-06-12

## 2019-06-12 RX ORDER — SILDENAFIL CITRATE 20 MG/1
80 TABLET ORAL PRN
Qty: 30 TABLET | Refills: 5 | Status: SHIPPED | OUTPATIENT
Start: 2019-06-12 | End: 2021-01-11 | Stop reason: SDUPTHER

## 2019-06-12 NOTE — TELEPHONE ENCOUNTER
From: Roma Wesley  To: Tay Poe MD  Sent: 6/12/2019 12:26 PM EDT  Subject: Prescription Question    I was mistaken in asking for the recent prescription for sildenafil to be sent to [facility]. Is it possible to cancel/resend to the pharmacy at [facility]? I have not filled the script sent to [facility].

## 2019-06-24 DIAGNOSIS — F51.04 PSYCHOPHYSIOLOGICAL INSOMNIA: Primary | ICD-10-CM

## 2019-06-24 RX ORDER — ZOLPIDEM TARTRATE 12.5 MG/1
12.5 TABLET, FILM COATED, EXTENDED RELEASE ORAL NIGHTLY PRN
Qty: 30 TABLET | Refills: 2 | Status: SHIPPED | OUTPATIENT
Start: 2019-06-24 | End: 2019-07-08

## 2019-06-25 ENCOUNTER — PATIENT MESSAGE (OUTPATIENT)
Dept: PSYCHIATRY | Age: 49
End: 2019-06-25

## 2019-06-27 ENCOUNTER — PATIENT MESSAGE (OUTPATIENT)
Dept: PSYCHIATRY | Age: 49
End: 2019-06-27

## 2019-06-27 RX ORDER — TRAZODONE HYDROCHLORIDE 50 MG/1
50 TABLET ORAL NIGHTLY
Qty: 90 TABLET | Refills: 1 | Status: SHIPPED | OUTPATIENT
Start: 2019-06-27 | End: 2019-07-03 | Stop reason: SDUPTHER

## 2019-07-01 ENCOUNTER — PATIENT MESSAGE (OUTPATIENT)
Dept: PSYCHIATRY | Age: 49
End: 2019-07-01

## 2019-07-03 ENCOUNTER — TELEPHONE (OUTPATIENT)
Dept: FAMILY MEDICINE CLINIC | Age: 49
End: 2019-07-03

## 2019-07-03 RX ORDER — FLUOXETINE HYDROCHLORIDE 20 MG/1
CAPSULE ORAL
Qty: 90 CAPSULE | Refills: 0 | Status: SHIPPED | OUTPATIENT
Start: 2019-07-03 | End: 2019-09-21 | Stop reason: SDUPTHER

## 2019-07-03 RX ORDER — TRAZODONE HYDROCHLORIDE 50 MG/1
50 TABLET ORAL NIGHTLY
Qty: 90 TABLET | Refills: 0 | Status: SHIPPED | OUTPATIENT
Start: 2019-07-03 | End: 2019-07-11 | Stop reason: SDUPTHER

## 2019-07-03 NOTE — TELEPHONE ENCOUNTER
Pt would like a call in regards to his medications and question he has been discussing with dr Channing Rosas through my chart

## 2019-07-11 ENCOUNTER — PATIENT MESSAGE (OUTPATIENT)
Dept: PSYCHIATRY | Age: 49
End: 2019-07-11

## 2019-07-11 RX ORDER — TRAZODONE HYDROCHLORIDE 100 MG/1
200 TABLET ORAL NIGHTLY
Qty: 180 TABLET | Refills: 1 | Status: SHIPPED | OUTPATIENT
Start: 2019-07-11 | End: 2019-09-06

## 2019-07-30 ENCOUNTER — OFFICE VISIT (OUTPATIENT)
Dept: FAMILY MEDICINE CLINIC | Age: 49
End: 2019-07-30
Payer: COMMERCIAL

## 2019-07-30 VITALS
BODY MASS INDEX: 38.11 KG/M2 | RESPIRATION RATE: 18 BRPM | HEART RATE: 92 BPM | WEIGHT: 281 LBS | SYSTOLIC BLOOD PRESSURE: 116 MMHG | DIASTOLIC BLOOD PRESSURE: 70 MMHG

## 2019-07-30 DIAGNOSIS — B02.9 HERPES ZOSTER WITHOUT COMPLICATION: Primary | ICD-10-CM

## 2019-07-30 PROCEDURE — 99213 OFFICE O/P EST LOW 20 MIN: CPT | Performed by: NURSE PRACTITIONER

## 2019-07-30 RX ORDER — VALACYCLOVIR HYDROCHLORIDE 1 G/1
1000 TABLET, FILM COATED ORAL 3 TIMES DAILY
Qty: 21 TABLET | Refills: 0 | Status: SHIPPED | OUTPATIENT
Start: 2019-07-30 | End: 2019-08-06

## 2019-07-30 ASSESSMENT — ENCOUNTER SYMPTOMS
SORE THROAT: 0
BACK PAIN: 0
ABDOMINAL PAIN: 0
COUGH: 0
VOMITING: 0
CONSTIPATION: 0
NAUSEA: 0
CHEST TIGHTNESS: 0
SHORTNESS OF BREATH: 0
DIARRHEA: 0

## 2019-07-30 NOTE — PROGRESS NOTES
anemia    B12 deficiency    Anastomotic ulcer    Malignant neoplasm of left kidney ([address] 8/1/18 Dr Alexandru Robert.  Malignant neoplasm of left kidney ([facility]CC) clear cell. 8/1/18 Dr Alexandru Robert. Current Outpatient Medications   Medication Sig Dispense Refill    valACYclovir (VALTREX) 1 g tablet Take 1 tablet by mouth 3 times daily for 7 days For treatment of shingles. 21 tablet 0    traZODone (DESYREL) 100 MG tablet Take 2 tablets by mouth nightly 180 tablet 1    FLUoxetine (PROZAC) 20 MG capsule TAKE 1 CAPSULE BY MOUTH DAILY 90 capsule 0    atorvastatin (LIPITOR) 40 MG tablet TAKE 1 TABLET BY MOUTH EVERY NIGHT AT BEDTIME 30 tablet 5    sildenafil (REVATIO) 20 MG tablet Take 4 tablets by mouth as needed (30 min prior to intercourse) 30 tablet 5    lithium (LITHOBID) 300 MG extended release tablet Take 1 tablet by mouth 2 times daily 60 tablet 0    pantoprazole (PROTONIX) 40 MG tablet Take 1 tablet by mouth daily 30 tablet 3    aspirin 81 MG EC tablet Take 81 mg by mouth      calcium-vitamin D (OSCAL 500/200 D-3) 500-200 MG-UNIT per tablet Take 1 tablet by mouth 2 times daily       Multiple Vitamin TABS Take 1 tablet by mouth daily        No current facility-administered medications for this visit. Allergies   Allergen Reactions    Prochlorperazine Other (See Comments)     No allergic reaction, patient reported sense of \"restlessness\" and fidgiting    Morphine Rash    Morphine And Related Itching    Nsaids Nausea Only     Hx of Barretts esophagus       Review of Systems   Constitutional: Positive for fatigue. Negative for appetite change, chills and fever. HENT: Negative for congestion, sneezing and sore throat. Respiratory: Negative for cough, chest tightness and shortness of breath. Cardiovascular: Negative for chest pain, palpitations and leg swelling. Gastrointestinal: Negative for abdominal pain, constipation, diarrhea, nausea and vomiting.    Genitourinary: Negative for difficulty urinating. Musculoskeletal: Positive for myalgias. Negative for arthralgias, back pain, gait problem and neck pain. Skin: Positive for rash. Neurological: Negative for dizziness, syncope, light-headedness and headaches. Psychiatric/Behavioral: Negative for confusion. Vitals:    07/30/19 1519   BP: 116/70   Site: Right Upper Arm   Position: Sitting   Cuff Size: Large Adult   Pulse: 92   Resp: 18   Weight: 281 lb (127.5 kg)       Body mass index is 38.11 kg/m². Wt Readings from Last 3 Encounters:   07/30/19 281 lb (127.5 kg)   03/26/19 277 lb (125.6 kg)   03/01/19 277 lb (125.6 kg)       BP Readings from Last 3 Encounters:   07/30/19 116/70   03/26/19 120/80   03/01/19 128/72       Physical Exam   Constitutional: He is oriented to person, place, and time. He appears well-developed and well-nourished. HENT:   Head: Normocephalic and atraumatic. Eyes: Pupils are equal, round, and reactive to light. Conjunctivae and EOM are normal.   Neck: Normal range of motion. Neck supple. Pulmonary/Chest: Effort normal.   Abdominal: Soft. Musculoskeletal: Normal range of motion. Neurological: He is alert and oriented to person, place, and time. No cranial nerve deficit. Skin: Skin is warm, dry and intact. Rash noted. Rash is papular and vesicular. Two clusters of vesiculopapular rash, one on midline of patient's middle/lower back and second on right middle/lower back in a dermatomal fashion. Multiple blisters present that have not crusted over. Rash does not extend to the abdomen. Psychiatric: He has a normal mood and affect. His speech is normal and behavior is normal. Judgment and thought content normal. Cognition and memory are normal.   Vitals reviewed. Joon Bronson was seen today for pruritic, slightly painful rash that has been present for two days on his back.  The rash appears vesiculopapular in a dermatomal pattern, consistent with Herpes Zoster virus which he

## 2019-07-31 ENCOUNTER — HOSPITAL ENCOUNTER (EMERGENCY)
Age: 49
Discharge: HOME | End: 2019-07-31
Attending: EMERGENCY MEDICINE
Payer: COMMERCIAL

## 2019-07-31 VITALS
SYSTOLIC BLOOD PRESSURE: 124 MMHG | TEMPERATURE: 98.4 F | WEIGHT: 286.6 LBS | HEART RATE: 66 BPM | RESPIRATION RATE: 18 BRPM | DIASTOLIC BLOOD PRESSURE: 79 MMHG | HEIGHT: 72 IN | OXYGEN SATURATION: 99 % | BODY MASS INDEX: 38.82 KG/M2

## 2019-07-31 DIAGNOSIS — B02.9 HERPES ZOSTER WITHOUT COMPLICATION: Primary | ICD-10-CM

## 2019-07-31 PROCEDURE — 6360000002 HC RX W HCPCS: Performed by: EMERGENCY MEDICINE

## 2019-07-31 PROCEDURE — 6370000000 HC RX 637 (ALT 250 FOR IP): Performed by: EMERGENCY MEDICINE

## 2019-07-31 PROCEDURE — 99282 EMERGENCY DEPT VISIT SF MDM: CPT

## 2019-07-31 PROCEDURE — 96372 THER/PROPH/DIAG INJ SC/IM: CPT

## 2019-07-31 RX ORDER — GABAPENTIN 100 MG/1
100 CAPSULE ORAL 2 TIMES DAILY
Qty: 20 CAPSULE | Refills: 0 | Status: SHIPPED | OUTPATIENT
Start: 2019-07-31 | End: 2019-08-09 | Stop reason: ALTCHOICE

## 2019-07-31 RX ORDER — PREDNISONE 20 MG/1
TABLET ORAL
Qty: 18 TABLET | Refills: 0 | Status: SHIPPED | OUTPATIENT
Start: 2019-07-31 | End: 2019-08-09 | Stop reason: ALTCHOICE

## 2019-07-31 RX ORDER — LIDOCAINE 4 G/G
1 PATCH TOPICAL DAILY
Status: DISCONTINUED | OUTPATIENT
Start: 2019-08-01 | End: 2019-08-01 | Stop reason: HOSPADM

## 2019-07-31 RX ORDER — KETOROLAC TROMETHAMINE 30 MG/ML
30 INJECTION, SOLUTION INTRAMUSCULAR; INTRAVENOUS ONCE
Status: COMPLETED | OUTPATIENT
Start: 2019-07-31 | End: 2019-07-31

## 2019-07-31 RX ADMIN — KETOROLAC TROMETHAMINE 30 MG: 30 INJECTION, SOLUTION INTRAMUSCULAR at 22:10

## 2019-07-31 ASSESSMENT — PAIN DESCRIPTION - FREQUENCY
FREQUENCY: CONTINUOUS
FREQUENCY: CONTINUOUS

## 2019-07-31 ASSESSMENT — PAIN DESCRIPTION - ORIENTATION
ORIENTATION: RIGHT
ORIENTATION: RIGHT

## 2019-07-31 ASSESSMENT — PAIN DESCRIPTION - PAIN TYPE
TYPE: ACUTE PAIN
TYPE: ACUTE PAIN

## 2019-07-31 ASSESSMENT — PAIN SCALES - GENERAL
PAINLEVEL_OUTOF10: 8
PAINLEVEL_OUTOF10: 7
PAINLEVEL_OUTOF10: 7

## 2019-07-31 ASSESSMENT — PAIN DESCRIPTION - DESCRIPTORS
DESCRIPTORS: ACHING;BURNING
DESCRIPTORS: BURNING

## 2019-07-31 ASSESSMENT — PAIN DESCRIPTION - LOCATION
LOCATION: FLANK
LOCATION: FLANK

## 2019-08-01 ASSESSMENT — ENCOUNTER SYMPTOMS
NAUSEA: 0
ABDOMINAL PAIN: 0
VOMITING: 0

## 2019-08-01 NOTE — ED NOTES
Ambulates to room #1 with the complaint of \"shingle pain\" states that he started with a burning sensation  At the right flank area that started last wednesday or Thursday. He saw his PMD yesterday and was DX with shingles and started on Valtrex. Complaining of burning pain at that area.      Seb Arreguin RN  07/31/19 [id number]

## 2019-08-01 NOTE — ED PROVIDER NOTES
R-0Normal      atorvastatin (LIPITOR) 40 MG tablet TAKE 1 TABLET BY MOUTH EVERY NIGHT AT BEDTIME, Disp-30 tablet, R-5Normal      sildenafil (REVATIO) 20 MG tablet Take 4 tablets by mouth as needed (30 min prior to intercourse), Disp-30 tablet, R-5Normal      pantoprazole (PROTONIX) 40 MG tablet Take 1 tablet by mouth daily, Disp-30 tablet, R-3Normal      calcium-vitamin D (OSCAL 500/200 D-3) 500-200 MG-UNIT per tablet Take 1 tablet by mouth 2 times daily Historical Med      Multiple Vitamin TABS Take 1 tablet by mouth daily       aspirin 81 MG EC tablet Take 81 mg by mouthHistorical Med             ALLERGIES     Prochlorperazine; Morphine; Morphine and related; and Nsaids    FAMILY HISTORY       Family History   Problem Relation Age of Onset    Cancer Father         Lymphoma    Dementia Other     Diabetes Other     Cancer Other     Diabetes Maternal Uncle     Heart Disease Maternal Uncle     Depression Other           SOCIAL HISTORY       Social History     Socioeconomic History    Marital status:      Spouse name: Steven Renee Number of children: 2    Years of education: None    Highest education level: None   Occupational History    Occupation:  --sales at [employer]   Social Needs    Financial resource strain: None    Food insecurity:     Worry: None     Inability: None    Transportation needs:     Medical: None     Non-medical: None   Tobacco Use    Smoking status: Former Smoker     Packs/day: 0.50     Years: 25.00     Pack years: 12.50     Types: Cigarettes     Last attempt to quit: 2017     Years since quittin.0    Smokeless tobacco: Never Used   Substance and Sexual Activity    Alcohol use: No     Alcohol/week: 0.0 standard drinks     Comment: last drink     Drug use: No     Comment: history of    Sexual activity: Yes     Partners: Female   Lifestyle    Physical activity:     Days per week: None     Minutes per session: None    Stress: None

## 2019-08-09 ENCOUNTER — OFFICE VISIT (OUTPATIENT)
Dept: FAMILY MEDICINE CLINIC | Age: 49
End: 2019-08-09
Payer: COMMERCIAL

## 2019-08-09 VITALS
HEIGHT: 72 IN | SYSTOLIC BLOOD PRESSURE: 110 MMHG | BODY MASS INDEX: 38.74 KG/M2 | OXYGEN SATURATION: 96 % | WEIGHT: 286 LBS | TEMPERATURE: 99 F | DIASTOLIC BLOOD PRESSURE: 68 MMHG | HEART RATE: 62 BPM

## 2019-08-09 DIAGNOSIS — I69.30 HISTORY OF CVA WITH RESIDUAL DEFICIT: ICD-10-CM

## 2019-08-09 DIAGNOSIS — Z90.81 HISTORY OF SPLENECTOMY: ICD-10-CM

## 2019-08-09 DIAGNOSIS — Z00.00 WELL ADULT EXAM: Primary | ICD-10-CM

## 2019-08-09 DIAGNOSIS — M25.561 RECURRENT PAIN OF RIGHT KNEE: ICD-10-CM

## 2019-08-09 PROCEDURE — 90471 IMMUNIZATION ADMIN: CPT | Performed by: FAMILY MEDICINE

## 2019-08-09 PROCEDURE — 99396 PREV VISIT EST AGE 40-64: CPT | Performed by: FAMILY MEDICINE

## 2019-08-09 PROCEDURE — 90732 PPSV23 VACC 2 YRS+ SUBQ/IM: CPT | Performed by: FAMILY MEDICINE

## 2019-08-09 ASSESSMENT — ENCOUNTER SYMPTOMS: ABDOMINAL PAIN: 1

## 2019-08-09 NOTE — PROGRESS NOTES
Subjective:      Patient ID: June Connors is a 51 y.o. male. Blood pressure 110/68, pulse 62, temperature 99 °F (37.2 °C), temperature source Oral, height 6' (1.829 m), weight 286 lb (129.7 kg), SpO2 96 %. HPI Here for wellness exam.  Recently had shingles R lower flank. Took valtrex, but had diarrhea from it. Feels much better now. No residual problem. This was about 2 weeks ago. Hx CVA (with mild L sided weakness), alcoholism with 4 yrs sobriety, left nephrectomy due to [address]. Sees psych, Dr Albertine Dubin for MDD. Feels this is under good control presently on fluox 20. No SI/HI. 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 Hx obesity and prior gastric bypass. Splenectomy also. Is due for menB and PVC23 booster (last more than 5 yrs ago)    Still on atorva 40. Tolerates well. Lab Results   Component Value Date    CHOL 120 03/26/2019    TRIG 122 03/26/2019    HDL 47 03/26/2019    LDLCALC 49 03/26/2019     Lab Results   Component Value Date    ALT 9 (L) 11/24/2018    AST 14 (L) 11/24/2018        Takes asa 81. No SE's. Prior CVA as noted above. Dental: has dentures. He does deal with chronic pain, fatigue. Pain in knee on R is increasing- prior TKA. Feels more weak and unstable and painful. No injury. No knee swelling. Surgeon was at [place]. no longer sees him. Name?  nsaids not used due to prior gastric bypass. Notes pain most when getting up and down from floor, up and down steps. Has not been to PT in years. Trying to do prior knee exercises. Does bike 10-15 miles every other day. Last renal function test:   Lab Results   Component Value Date     03/26/2019    K 4.8 03/26/2019    K 4.2 11/23/2018    BUN 8 03/26/2019    CREATININE 1.1 03/26/2019        Lab Results   Component Value Date    WBC 8.8 11/24/2018    HGB 12.9 (L) 11/24/2018    HCT 40.5 11/24/2018    MCV 87.2 11/24/2018     11/24/2018     Uses cpap nightly.   Still is chronically tired and sleeps (REVATIO) 20 MG tablet Take 4 tablets by mouth as needed (30 min prior to intercourse) 30 tablet 5    pantoprazole (PROTONIX) 40 MG tablet Take 1 tablet by mouth daily 30 tablet 3    aspirin 81 MG EC tablet Take 81 mg by mouth      calcium-vitamin D (OSCAL 500/200 D-3) 500-200 MG-UNIT per tablet Take 1 tablet by mouth 2 times daily       Multiple Vitamin TABS Take 1 tablet by mouth daily        No current facility-administered medications for this visit. Immunization History   Administered Date(s) Administered    HIB PRP-T (ActHIB, Hiberix) 2010    Hib, unspecified 2015    Influenza Virus Vaccine 2018    Influenza Whole 10/04/2012    Influenza, Intradermal, Preservative free 10/28/2014    Influenza, Intradermal, Quadrivalent, Preservative Free 2016    Influenza, Quadv, Recombinant, IM PF (Flublok 18 yrs and older) 10/15/2018    Meningococcal ACWY Vaccine 01/15/2009    Meningococcal MCV4P (Menactra) 2015    Meningococcal MPSV4 (Menomune) 01/15/2009    Pneumococcal Conjugate 13-valent (Kcrmpog03) 2015    Pneumococcal Polysaccharide (Dauohrwcy06) 01/15/2009, 10/09/2013    Td, unspecified formulation 2007    Tdap (Boostrix, Adacel) 2007, 2014        Social History     Tobacco Use    Smoking status: Former Smoker     Packs/day: 0.50     Years: 25.00     Pack years: 12.50     Types: Cigarettes     Last attempt to quit: 2017     Years since quittin.0    Smokeless tobacco: Never Used   Substance Use Topics    Alcohol use: No     Alcohol/week: 0.0 standard drinks     Comment: last drink     Drug use: No     Comment: history of        Review of Systems   Constitutional: Positive for fatigue. Gastrointestinal: Positive for abdominal pain (chronic- due to adhesion disease). Musculoskeletal: Positive for arthralgias (knee). All other systems reviewed and are negative.        Objective:   Physical Exam   Constitutional: He is

## 2019-08-09 NOTE — PROGRESS NOTES
With patients permission, pneumococcal vaccine (Pneumovax 23) was given IM in left deltoid in a standard sterile fashion. The patient tolerated the procedure well with out difficulty.

## 2019-08-14 ENCOUNTER — APPOINTMENT (OUTPATIENT)
Dept: CT IMAGING | Age: 49
End: 2019-08-14
Payer: COMMERCIAL

## 2019-08-14 ENCOUNTER — HOSPITAL ENCOUNTER (EMERGENCY)
Age: 49
Discharge: HOME | End: 2019-08-15
Payer: COMMERCIAL

## 2019-08-14 DIAGNOSIS — R10.31 ABDOMINAL PAIN, RIGHT LOWER QUADRANT: Primary | ICD-10-CM

## 2019-08-14 LAB
A/G RATIO: 1.8 (ref 1.1–2.2)
ALBUMIN SERPL-MCNC: 4.1 G/DL (ref 3.4–5)
ALP BLD-CCNC: 89 U/L (ref 40–129)
ALT SERPL-CCNC: 11 U/L (ref 10–40)
ANION GAP SERPL CALCULATED.3IONS-SCNC: 9 MMOL/L (ref 3–16)
AST SERPL-CCNC: 12 U/L (ref 15–37)
BASOPHILS ABSOLUTE: 0.4 K/UL (ref 0–0.2)
BASOPHILS RELATIVE PERCENT: 3.8 %
BILIRUB SERPL-MCNC: <0.2 MG/DL (ref 0–1)
BILIRUBIN URINE: NEGATIVE
BLOOD, URINE: NEGATIVE
BUN BLDV-MCNC: 13 MG/DL (ref 7–20)
CALCIUM SERPL-MCNC: 9.1 MG/DL (ref 8.3–10.6)
CHLORIDE BLD-SCNC: 105 MMOL/L (ref 99–110)
CLARITY: CLEAR
CO2: 26 MMOL/L (ref 21–32)
COLOR: YELLOW
CREAT SERPL-MCNC: 1.1 MG/DL (ref 0.9–1.3)
EOSINOPHILS ABSOLUTE: 0.4 K/UL (ref 0–0.6)
EOSINOPHILS RELATIVE PERCENT: 3.4 %
GFR AFRICAN AMERICAN: >60
GFR NON-AFRICAN AMERICAN: >60
GLOBULIN: 2.3 G/DL
GLUCOSE BLD-MCNC: 92 MG/DL (ref 70–99)
GLUCOSE URINE: NEGATIVE MG/DL
HCT VFR BLD CALC: 41.7 % (ref 40.5–52.5)
HEMOGLOBIN: 13.7 G/DL (ref 13.5–17.5)
KETONES, URINE: NEGATIVE MG/DL
LEUKOCYTE ESTERASE, URINE: NEGATIVE
LIPASE: 37 U/L (ref 13–60)
LYMPHOCYTES ABSOLUTE: 3.7 K/UL (ref 1–5.1)
LYMPHOCYTES RELATIVE PERCENT: 33.5 %
MCH RBC QN AUTO: 29.7 PG (ref 26–34)
MCHC RBC AUTO-ENTMCNC: 32.8 G/DL (ref 31–36)
MCV RBC AUTO: 90.4 FL (ref 80–100)
MICROSCOPIC EXAMINATION: NORMAL
MONOCYTES ABSOLUTE: 0.7 K/UL (ref 0–1.3)
MONOCYTES RELATIVE PERCENT: 6.2 %
NEUTROPHILS ABSOLUTE: 5.8 K/UL (ref 1.7–7.7)
NEUTROPHILS RELATIVE PERCENT: 53.1 %
NITRITE, URINE: NEGATIVE
PDW BLD-RTO: 15.8 % (ref 12.4–15.4)
PH UA: 7.5 (ref 5–8)
PLATELET # BLD: 353 K/UL (ref 135–450)
PMV BLD AUTO: 8.3 FL (ref 5–10.5)
POTASSIUM REFLEX MAGNESIUM: 4.1 MMOL/L (ref 3.5–5.1)
PROTEIN UA: NEGATIVE MG/DL
RBC # BLD: 4.61 M/UL (ref 4.2–5.9)
SODIUM BLD-SCNC: 140 MMOL/L (ref 136–145)
SPECIFIC GRAVITY UA: 1.01 (ref 1–1.03)
TOTAL PROTEIN: 6.4 G/DL (ref 6.4–8.2)
URINE REFLEX TO CULTURE: NORMAL
URINE TYPE: NORMAL
UROBILINOGEN, URINE: 1 E.U./DL
WBC # BLD: 10.9 K/UL (ref 4–11)

## 2019-08-14 PROCEDURE — 83690 ASSAY OF LIPASE: CPT

## 2019-08-14 PROCEDURE — 85025 COMPLETE CBC W/AUTO DIFF WBC: CPT

## 2019-08-14 PROCEDURE — 6360000004 HC RX CONTRAST MEDICATION: Performed by: PHYSICIAN ASSISTANT

## 2019-08-14 PROCEDURE — 96376 TX/PRO/DX INJ SAME DRUG ADON: CPT

## 2019-08-14 PROCEDURE — 6360000002 HC RX W HCPCS: Performed by: PHYSICIAN ASSISTANT

## 2019-08-14 PROCEDURE — 80053 COMPREHEN METABOLIC PANEL: CPT

## 2019-08-14 PROCEDURE — 74177 CT ABD & PELVIS W/CONTRAST: CPT

## 2019-08-14 PROCEDURE — 2580000003 HC RX 258: Performed by: PHYSICIAN ASSISTANT

## 2019-08-14 PROCEDURE — 96375 TX/PRO/DX INJ NEW DRUG ADDON: CPT

## 2019-08-14 PROCEDURE — 81003 URINALYSIS AUTO W/O SCOPE: CPT

## 2019-08-14 PROCEDURE — 96374 THER/PROPH/DIAG INJ IV PUSH: CPT

## 2019-08-14 PROCEDURE — 99284 EMERGENCY DEPT VISIT MOD MDM: CPT

## 2019-08-14 RX ORDER — ONDANSETRON 2 MG/ML
4 INJECTION INTRAMUSCULAR; INTRAVENOUS ONCE
Status: COMPLETED | OUTPATIENT
Start: 2019-08-14 | End: 2019-08-14

## 2019-08-14 RX ORDER — 0.9 % SODIUM CHLORIDE 0.9 %
1000 INTRAVENOUS SOLUTION INTRAVENOUS ONCE
Status: COMPLETED | OUTPATIENT
Start: 2019-08-14 | End: 2019-08-14

## 2019-08-14 RX ADMIN — HYDROMORPHONE HYDROCHLORIDE 0.5 MG: 1 INJECTION, SOLUTION INTRAMUSCULAR; INTRAVENOUS; SUBCUTANEOUS at 22:28

## 2019-08-14 RX ADMIN — SODIUM CHLORIDE 1000 ML: 9 INJECTION, SOLUTION INTRAVENOUS at 22:27

## 2019-08-14 RX ADMIN — HYDROMORPHONE HYDROCHLORIDE 1 MG: 1 INJECTION, SOLUTION INTRAMUSCULAR; INTRAVENOUS; SUBCUTANEOUS at 23:52

## 2019-08-14 RX ADMIN — ONDANSETRON 4 MG: 2 INJECTION INTRAMUSCULAR; INTRAVENOUS at 22:28

## 2019-08-14 RX ADMIN — IOPAMIDOL 75 ML: 755 INJECTION, SOLUTION INTRAVENOUS at 23:05

## 2019-08-14 ASSESSMENT — PAIN - FUNCTIONAL ASSESSMENT: PAIN_FUNCTIONAL_ASSESSMENT: ACTIVITIES ARE NOT PREVENTED

## 2019-08-14 ASSESSMENT — PAIN DESCRIPTION - ONSET: ONSET: SUDDEN

## 2019-08-14 ASSESSMENT — PAIN DESCRIPTION - FREQUENCY: FREQUENCY: CONTINUOUS

## 2019-08-14 ASSESSMENT — PAIN DESCRIPTION - DESCRIPTORS
DESCRIPTORS: STABBING;ACHING
DESCRIPTORS: ACHING

## 2019-08-14 ASSESSMENT — PAIN SCALES - GENERAL
PAINLEVEL_OUTOF10: 6
PAINLEVEL_OUTOF10: 3
PAINLEVEL_OUTOF10: 7
PAINLEVEL_OUTOF10: 9

## 2019-08-14 ASSESSMENT — PAIN DESCRIPTION - PAIN TYPE
TYPE: ACUTE PAIN
TYPE: ACUTE PAIN

## 2019-08-14 ASSESSMENT — PAIN DESCRIPTION - LOCATION
LOCATION: ABDOMEN
LOCATION: ABDOMEN

## 2019-08-14 ASSESSMENT — PAIN DESCRIPTION - ORIENTATION
ORIENTATION: ANTERIOR
ORIENTATION: RIGHT;LOWER

## 2019-08-14 ASSESSMENT — PAIN DESCRIPTION - PROGRESSION: CLINICAL_PROGRESSION: GRADUALLY IMPROVING

## 2019-08-15 VITALS
DIASTOLIC BLOOD PRESSURE: 70 MMHG | RESPIRATION RATE: 16 BRPM | BODY MASS INDEX: 37.92 KG/M2 | TEMPERATURE: 98.3 F | WEIGHT: 279.98 LBS | HEART RATE: 66 BPM | SYSTOLIC BLOOD PRESSURE: 115 MMHG | HEIGHT: 72 IN | OXYGEN SATURATION: 98 %

## 2019-08-15 PROCEDURE — 6370000000 HC RX 637 (ALT 250 FOR IP): Performed by: PHYSICIAN ASSISTANT

## 2019-08-15 RX ORDER — OXYCODONE HYDROCHLORIDE AND ACETAMINOPHEN 5; 325 MG/1; MG/1
1 TABLET ORAL ONCE
Status: COMPLETED | OUTPATIENT
Start: 2019-08-15 | End: 2019-08-15

## 2019-08-15 RX ORDER — OXYCODONE HYDROCHLORIDE AND ACETAMINOPHEN 5; 325 MG/1; MG/1
1 TABLET ORAL EVERY 6 HOURS PRN
Qty: 12 TABLET | Refills: 0 | Status: SHIPPED | OUTPATIENT
Start: 2019-08-15 | End: 2019-08-20

## 2019-08-15 RX ADMIN — OXYCODONE HYDROCHLORIDE AND ACETAMINOPHEN 1 TABLET: 5; 325 TABLET ORAL at 00:56

## 2019-08-15 ASSESSMENT — PAIN - FUNCTIONAL ASSESSMENT
PAIN_FUNCTIONAL_ASSESSMENT: 0-10
PAIN_FUNCTIONAL_ASSESSMENT: ACTIVITIES ARE NOT PREVENTED
PAIN_FUNCTIONAL_ASSESSMENT: ACTIVITIES ARE NOT PREVENTED

## 2019-08-15 ASSESSMENT — PAIN SCALES - GENERAL
PAINLEVEL_OUTOF10: 8
PAINLEVEL_OUTOF10: 2
PAINLEVEL_OUTOF10: 3

## 2019-08-15 ASSESSMENT — PAIN DESCRIPTION - DESCRIPTORS
DESCRIPTORS: ACHING
DESCRIPTORS: ACHING

## 2019-08-15 ASSESSMENT — PAIN DESCRIPTION - LOCATION
LOCATION: ABDOMEN
LOCATION: ABDOMEN

## 2019-08-15 ASSESSMENT — PAIN DESCRIPTION - ONSET
ONSET: SUDDEN
ONSET: SUDDEN

## 2019-08-15 ASSESSMENT — PAIN DESCRIPTION - ORIENTATION
ORIENTATION: ANTERIOR
ORIENTATION: ANTERIOR

## 2019-08-15 ASSESSMENT — PAIN DESCRIPTION - PROGRESSION
CLINICAL_PROGRESSION: GRADUALLY IMPROVING
CLINICAL_PROGRESSION: GRADUALLY IMPROVING

## 2019-08-15 ASSESSMENT — PAIN DESCRIPTION - FREQUENCY
FREQUENCY: CONTINUOUS
FREQUENCY: CONTINUOUS

## 2019-08-15 ASSESSMENT — PAIN DESCRIPTION - PAIN TYPE
TYPE: ACUTE PAIN
TYPE: ACUTE PAIN

## 2019-08-15 NOTE — ED PROVIDER NOTES
[address]       Pt Name: Jennifer Gasca  MRN: [id number]  [place] 1970  Date of evaluation: 8/14/2019  Provider: JEMAL Patel    The ED Attending Physician was available for consultation but did not see or evaluate this patient. CHIEF COMPLAINT       Chief Complaint   Patient presents with    Abdominal Pain     RLQ, started about a week ago and pain has increased       HISTORY OF PRESENT ILLNESS  (Location/Symptom, Timing/Onset, Context/Setting, Quality, Duration, Modifying Factors, Severity.)   Jennifer Gasca is a 51 y.o. male who presents to the emergency department with complaint of right lower quadrant abdominal pain, also some nausea. Patient says he has been having pain in the right lower quadrant for about a week, made worse with certain movements, and has had somewhat decreased appetite with persistent nausea, but he has not vomited, and he is still eating some. Denies any diarrhea or constipation or blood in stool. Patient says he has an extensive history of abdominal surgery, including a nephrectomy on the left side from cancer, and has had his spleen removed. He says he believes he still has his appendix and gallbladder. He says he has had a problem with adhesions in the past.  He says the periodically goes to pain management for ketamine infusions but does not take any home pain medication. Denies any urinary complaints. Denies any fever, chest pain, shortness of breath, cough or cold symptoms. No other complaints. Nursing Notes were reviewed and I agree. REVIEW OF SYSTEMS    (2-9 systems for level 4, 10 or more for level 5)     Constitutional:  Negative for fever, chills, fatigue and unexpected weight change. HENT:  Negative for congestion, ear pain, facial swelling, rhinorrhea, sinus pressure, sneezing, sore throat and trouble swallowing. Eyes:  Negative for photophobia, pain and visual disturbance.    Respiratory: CARPAL TUNNEL RELEASE      bilat    DILATATION, ESOPHAGUS      ENDOSCOPY, COLON, DIAGNOSTIC      EYE SURGERY      GASTRIC BYPASS SURGERY  Jan 2009    Has lost about 200 pounds.  HERNIA REPAIR  3-    ventral    JOINT REPLACEMENT      KIDNEY REMOVAL Left 08/01/2018    KNEE ARTHROSCOPY Right 7/11/2013    Dr.Robert Sanders     KNEE ARTHROSCOPY Right 7/11/12    RIGHT KNEE ARTHROSCOPY WITH CHONDROPLASTY    KNEE SURGERY  July 2012    right, arthroscopy    LAPAROTOMY      LASIK  2005    OTHER SURGICAL HISTORY Left 03/08/2016    CT biopsy ablasion left kidney    OTHER SURGICAL HISTORY  2016    small intestine 12 inch removed, 2 hernia surgeries, another surgery to drain infection from incision.      SPLENECTOMY      TOTAL KNEE ARTHROPLASTY  10/15/13    RIGHT    UPPER GASTROINTESTINAL ENDOSCOPY  6-7-2016    UPPER GASTROINTESTINAL ENDOSCOPY  04/02/2018       CURRENT MEDICATIONS       Discharge Medication List as of 8/15/2019 12:57 AM      CONTINUE these medications which have NOT CHANGED    Details   Meningococcal B Vac, Recomb, JT 0.5 ml IM x1 (repeat at 1-2 months and 6  Months), Disp-0.5 mL, R-2Print      traZODone (DESYREL) 100 MG tablet Take 2 tablets by mouth nightly, Disp-180 tablet, R-1Normal      FLUoxetine (PROZAC) 20 MG capsule TAKE 1 CAPSULE BY MOUTH DAILY, Disp-90 capsule, R-0Normal      atorvastatin (LIPITOR) 40 MG tablet TAKE 1 TABLET BY MOUTH EVERY NIGHT AT BEDTIME, Disp-30 tablet, R-5Normal      sildenafil (REVATIO) 20 MG tablet Take 4 tablets by mouth as needed (30 min prior to intercourse), Disp-30 tablet, R-5Normal      pantoprazole (PROTONIX) 40 MG tablet Take 1 tablet by mouth daily, Disp-30 tablet, R-3Normal      aspirin 81 MG EC tablet Take 81 mg by mouthHistorical Med      calcium-vitamin D (OSCAL 500/200 D-3) 500-200 MG-UNIT per tablet Take 1 tablet by mouth 2 times daily Historical Med      Multiple Vitamin TABS Take 1 tablet by mouth daily              ALLERGIES

## 2019-08-25 ENCOUNTER — HOSPITAL ENCOUNTER (EMERGENCY)
Age: 49
Discharge: HOME | End: 2019-08-25
Attending: EMERGENCY MEDICINE
Payer: COMMERCIAL

## 2019-08-25 VITALS
SYSTOLIC BLOOD PRESSURE: 119 MMHG | HEART RATE: 65 BPM | OXYGEN SATURATION: 98 % | RESPIRATION RATE: 18 BRPM | DIASTOLIC BLOOD PRESSURE: 73 MMHG | HEIGHT: 72 IN | WEIGHT: 279.98 LBS | TEMPERATURE: 98.4 F | BODY MASS INDEX: 37.92 KG/M2

## 2019-08-25 DIAGNOSIS — R10.9 ABDOMINAL PAIN, UNSPECIFIED ABDOMINAL LOCATION: Primary | ICD-10-CM

## 2019-08-25 LAB
A/G RATIO: 1.7 (ref 1.1–2.2)
ALBUMIN SERPL-MCNC: 4.1 G/DL (ref 3.4–5)
ALP BLD-CCNC: 100 U/L (ref 40–129)
ALT SERPL-CCNC: 12 U/L (ref 10–40)
ANION GAP SERPL CALCULATED.3IONS-SCNC: 12 MMOL/L (ref 3–16)
AST SERPL-CCNC: 12 U/L (ref 15–37)
BASOPHILS ABSOLUTE: 0.1 K/UL (ref 0–0.2)
BASOPHILS RELATIVE PERCENT: 1.2 %
BILIRUB SERPL-MCNC: 0.5 MG/DL (ref 0–1)
BILIRUBIN URINE: NEGATIVE
BLOOD, URINE: NEGATIVE
BUN BLDV-MCNC: 6 MG/DL (ref 7–20)
CALCIUM SERPL-MCNC: 9 MG/DL (ref 8.3–10.6)
CHLORIDE BLD-SCNC: 104 MMOL/L (ref 99–110)
CLARITY: CLEAR
CO2: 24 MMOL/L (ref 21–32)
COLOR: YELLOW
CREAT SERPL-MCNC: 1 MG/DL (ref 0.9–1.3)
EOSINOPHILS ABSOLUTE: 0.3 K/UL (ref 0–0.6)
EOSINOPHILS RELATIVE PERCENT: 5.3 %
EPITHELIAL CELLS, UA: 0 /HPF (ref 0–5)
GFR AFRICAN AMERICAN: >60
GFR NON-AFRICAN AMERICAN: >60
GLOBULIN: 2.4 G/DL
GLUCOSE BLD-MCNC: 90 MG/DL (ref 70–99)
GLUCOSE URINE: NEGATIVE MG/DL
HCT VFR BLD CALC: 44.7 % (ref 40.5–52.5)
HEMOGLOBIN: 14.5 G/DL (ref 13.5–17.5)
HYALINE CASTS: 0 /LPF (ref 0–8)
KETONES, URINE: NEGATIVE MG/DL
LEUKOCYTE ESTERASE, URINE: ABNORMAL
LIPASE: 29 U/L (ref 13–60)
LYMPHOCYTES ABSOLUTE: 2 K/UL (ref 1–5.1)
LYMPHOCYTES RELATIVE PERCENT: 34.5 %
MCH RBC QN AUTO: 29.7 PG (ref 26–34)
MCHC RBC AUTO-ENTMCNC: 32.4 G/DL (ref 31–36)
MCV RBC AUTO: 91.5 FL (ref 80–100)
MICROSCOPIC EXAMINATION: YES
MONOCYTES ABSOLUTE: 0.4 K/UL (ref 0–1.3)
MONOCYTES RELATIVE PERCENT: 7.2 %
NEUTROPHILS ABSOLUTE: 3 K/UL (ref 1.7–7.7)
NEUTROPHILS RELATIVE PERCENT: 51.8 %
NITRITE, URINE: NEGATIVE
PDW BLD-RTO: 16.1 % (ref 12.4–15.4)
PH UA: 6 (ref 5–8)
PLATELET # BLD: 328 K/UL (ref 135–450)
PMV BLD AUTO: 8.8 FL (ref 5–10.5)
POTASSIUM REFLEX MAGNESIUM: 4.3 MMOL/L (ref 3.5–5.1)
PROTEIN UA: NEGATIVE MG/DL
RBC # BLD: 4.88 M/UL (ref 4.2–5.9)
RBC UA: 0 /HPF (ref 0–4)
SODIUM BLD-SCNC: 140 MMOL/L (ref 136–145)
SPECIFIC GRAVITY UA: 1.01 (ref 1–1.03)
TOTAL PROTEIN: 6.5 G/DL (ref 6.4–8.2)
URINE REFLEX TO CULTURE: YES
URINE TYPE: ABNORMAL
UROBILINOGEN, URINE: 0.2 E.U./DL
WBC # BLD: 5.9 K/UL (ref 4–11)
WBC UA: 1 /HPF (ref 0–5)

## 2019-08-25 PROCEDURE — 83690 ASSAY OF LIPASE: CPT

## 2019-08-25 PROCEDURE — 99284 EMERGENCY DEPT VISIT MOD MDM: CPT

## 2019-08-25 PROCEDURE — 96374 THER/PROPH/DIAG INJ IV PUSH: CPT

## 2019-08-25 PROCEDURE — 85025 COMPLETE CBC W/AUTO DIFF WBC: CPT

## 2019-08-25 PROCEDURE — 96375 TX/PRO/DX INJ NEW DRUG ADDON: CPT

## 2019-08-25 PROCEDURE — 80053 COMPREHEN METABOLIC PANEL: CPT

## 2019-08-25 PROCEDURE — 2500000003 HC RX 250 WO HCPCS: Performed by: EMERGENCY MEDICINE

## 2019-08-25 PROCEDURE — 87086 URINE CULTURE/COLONY COUNT: CPT

## 2019-08-25 PROCEDURE — 6360000002 HC RX W HCPCS: Performed by: EMERGENCY MEDICINE

## 2019-08-25 PROCEDURE — 81001 URINALYSIS AUTO W/SCOPE: CPT

## 2019-08-25 RX ORDER — ONDANSETRON 2 MG/ML
4 INJECTION INTRAMUSCULAR; INTRAVENOUS ONCE
Status: COMPLETED | OUTPATIENT
Start: 2019-08-25 | End: 2019-08-25

## 2019-08-25 RX ORDER — KETAMINE HCL IN NACL, ISO-OSM 100MG/10ML
20 SYRINGE (ML) INJECTION ONCE
Status: COMPLETED | OUTPATIENT
Start: 2019-08-25 | End: 2019-08-25

## 2019-08-25 RX ADMIN — Medication 20 MG: at 13:39

## 2019-08-25 RX ADMIN — ONDANSETRON 4 MG: 2 INJECTION INTRAMUSCULAR; INTRAVENOUS at 13:39

## 2019-08-25 ASSESSMENT — ENCOUNTER SYMPTOMS
DIARRHEA: 0
SHORTNESS OF BREATH: 0
ABDOMINAL PAIN: 1
EYE REDNESS: 0
NAUSEA: 1
CONSTIPATION: 0
VOMITING: 0
RHINORRHEA: 0
SORE THROAT: 0

## 2019-08-25 ASSESSMENT — PAIN - FUNCTIONAL ASSESSMENT
PAIN_FUNCTIONAL_ASSESSMENT: ACTIVITIES ARE NOT PREVENTED
PAIN_FUNCTIONAL_ASSESSMENT: ACTIVITIES ARE NOT PREVENTED

## 2019-08-25 ASSESSMENT — PAIN DESCRIPTION - LOCATION
LOCATION: ABDOMEN

## 2019-08-25 ASSESSMENT — PAIN DESCRIPTION - DESCRIPTORS
DESCRIPTORS: SHARP;SHOOTING
DESCRIPTORS: ACHING;TENDER
DESCRIPTORS: TENDER;ACHING

## 2019-08-25 ASSESSMENT — PAIN DESCRIPTION - PAIN TYPE
TYPE: CHRONIC PAIN

## 2019-08-25 ASSESSMENT — PAIN SCALES - GENERAL
PAINLEVEL_OUTOF10: 5
PAINLEVEL_OUTOF10: 9
PAINLEVEL_OUTOF10: 9
PAINLEVEL_OUTOF10: 5

## 2019-08-25 ASSESSMENT — PAIN DESCRIPTION - ORIENTATION
ORIENTATION: RIGHT;LOWER

## 2019-08-25 ASSESSMENT — PAIN DESCRIPTION - FREQUENCY
FREQUENCY: CONTINUOUS

## 2019-08-25 ASSESSMENT — PAIN DESCRIPTION - PROGRESSION: CLINICAL_PROGRESSION: NOT CHANGED

## 2019-08-25 NOTE — ED TRIAGE NOTES
Pt here with c/o right lower abdominal pain that he rates as a 9/10 when laying still and 10/10 when attempting to move. Pt has been diagnosed with surgical abdominal adhesions and has an appointment with pain management clinic in 9 days.

## 2019-08-25 NOTE — ED PROVIDER NOTES
Negative for rash. Neurological: Negative for headaches. Hematological: Negative for adenopathy. Psychiatric/Behavioral: Negative for confusion. Except as noted above the remainder of the review of systems was reviewed and negative. PAST MEDICAL HISTORY     Past Medical History:   Diagnosis Date    Alcoholism ([address]     Allergic migraine with status migrainosus     Allergic rhinitis, seasonal     Anemia     Arthritis     right knee    Vaughn's esophagus     Benign intracranial hypertension     Carpal tunnel syndrome     Chronic pain     Depression     Depression     GERD (gastroesophageal reflux disease)     Headache(784.0)     History of blood transfusion     History of tobacco use     Quit 7/2014    Migraine     chronic for 1 year    Morbid obesity ([address]     Movement disorder     Onychomycosis     Sleep apnea, obstructive     Severe uses cpap stop bang 6    Unspecified cerebral artery occlusion with cerebral infarction 2014    slight weakness in left arm    Wears dentures     full set    Wears glasses          SURGICALHISTORY       Past Surgical History:   Procedure Laterality Date    ABDOMEN SURGERY      gastric bypass    ABDOMEN SURGERY  4-7-2016    repair of recurrent incisional hernia with mesh, removal of old mesh, bilateral component separation    ABDOMINAL EXPLORATION SURGERY  1/11/16    exp lap, lysis of adhesions, small bowel resection    CARPAL TUNNEL RELEASE      bilat    DILATATION, ESOPHAGUS      ENDOSCOPY, COLON, DIAGNOSTIC      EYE SURGERY      GASTRIC BYPASS SURGERY  Jan 2009    Has lost about 200 pounds.     HERNIA REPAIR  3-    ventral    JOINT REPLACEMENT      KIDNEY REMOVAL Left 08/01/2018    KNEE ARTHROSCOPY Right 7/11/2013    Dr.Robert Sanders     KNEE ARTHROSCOPY Right 7/11/12    RIGHT KNEE ARTHROSCOPY WITH CHONDROPLASTY    KNEE SURGERY  July 2012    right, arthroscopy    LAPAROTOMY      LASIK  2005    OTHER SURGICAL HISTORY

## 2019-08-26 LAB — URINE CULTURE, ROUTINE: NORMAL

## 2019-08-28 ENCOUNTER — HOSPITAL ENCOUNTER (EMERGENCY)
Age: 49
Discharge: HOME | End: 2019-08-28
Attending: EMERGENCY MEDICINE
Payer: COMMERCIAL

## 2019-08-28 VITALS
TEMPERATURE: 98 F | WEIGHT: 280 LBS | HEART RATE: 56 BPM | DIASTOLIC BLOOD PRESSURE: 76 MMHG | BODY MASS INDEX: 37.93 KG/M2 | RESPIRATION RATE: 19 BRPM | SYSTOLIC BLOOD PRESSURE: 136 MMHG | OXYGEN SATURATION: 100 % | HEIGHT: 72 IN

## 2019-08-28 DIAGNOSIS — G89.29 CHRONIC ABDOMINAL PAIN: ICD-10-CM

## 2019-08-28 DIAGNOSIS — R42 LIGHTHEADEDNESS: Primary | ICD-10-CM

## 2019-08-28 DIAGNOSIS — R10.9 CHRONIC ABDOMINAL PAIN: ICD-10-CM

## 2019-08-28 LAB
A/G RATIO: 1.8 (ref 1.1–2.2)
ALBUMIN SERPL-MCNC: 4.4 G/DL (ref 3.4–5)
ALP BLD-CCNC: 100 U/L (ref 40–129)
ALT SERPL-CCNC: 14 U/L (ref 10–40)
ANION GAP SERPL CALCULATED.3IONS-SCNC: 12 MMOL/L (ref 3–16)
AST SERPL-CCNC: 15 U/L (ref 15–37)
BASOPHILS ABSOLUTE: 0.1 K/UL (ref 0–0.2)
BASOPHILS RELATIVE PERCENT: 1.7 %
BILIRUB SERPL-MCNC: 0.3 MG/DL (ref 0–1)
BILIRUBIN URINE: NEGATIVE
BLOOD, URINE: NEGATIVE
BUN BLDV-MCNC: 6 MG/DL (ref 7–20)
CALCIUM SERPL-MCNC: 9.2 MG/DL (ref 8.3–10.6)
CHLORIDE BLD-SCNC: 104 MMOL/L (ref 99–110)
CLARITY: CLEAR
CO2: 24 MMOL/L (ref 21–32)
COLOR: YELLOW
CREAT SERPL-MCNC: 1.2 MG/DL (ref 0.9–1.3)
EKG ATRIAL RATE: 61 BPM
EKG DIAGNOSIS: NORMAL
EKG P AXIS: 56 DEGREES
EKG P-R INTERVAL: 162 MS
EKG Q-T INTERVAL: 406 MS
EKG QRS DURATION: 84 MS
EKG QTC CALCULATION (BAZETT): 408 MS
EKG R AXIS: 24 DEGREES
EKG T AXIS: 28 DEGREES
EKG VENTRICULAR RATE: 61 BPM
EOSINOPHILS ABSOLUTE: 0.3 K/UL (ref 0–0.6)
EOSINOPHILS RELATIVE PERCENT: 4.9 %
GFR AFRICAN AMERICAN: >60
GFR NON-AFRICAN AMERICAN: >60
GLOBULIN: 2.5 G/DL
GLUCOSE BLD-MCNC: 78 MG/DL (ref 70–99)
GLUCOSE URINE: NEGATIVE MG/DL
HCG QUALITATIVE: NEGATIVE
HCT VFR BLD CALC: 42.4 % (ref 40.5–52.5)
HEMOGLOBIN: 14.3 G/DL (ref 13.5–17.5)
KETONES, URINE: NEGATIVE MG/DL
LEUKOCYTE ESTERASE, URINE: NEGATIVE
LIPASE: 34 U/L (ref 13–60)
LYMPHOCYTES ABSOLUTE: 2.9 K/UL (ref 1–5.1)
LYMPHOCYTES RELATIVE PERCENT: 40.1 %
MCH RBC QN AUTO: 30.3 PG (ref 26–34)
MCHC RBC AUTO-ENTMCNC: 33.6 G/DL (ref 31–36)
MCV RBC AUTO: 90 FL (ref 80–100)
MICROSCOPIC EXAMINATION: NORMAL
MONOCYTES ABSOLUTE: 0.5 K/UL (ref 0–1.3)
MONOCYTES RELATIVE PERCENT: 6.7 %
NEUTROPHILS ABSOLUTE: 3.3 K/UL (ref 1.7–7.7)
NEUTROPHILS RELATIVE PERCENT: 46.6 %
NITRITE, URINE: NEGATIVE
OCCULT BLOOD DIAGNOSTIC: NORMAL
PDW BLD-RTO: 16.1 % (ref 12.4–15.4)
PH UA: 6 (ref 5–8)
PLATELET # BLD: 348 K/UL (ref 135–450)
PLATELET SLIDE REVIEW: ADEQUATE
PMV BLD AUTO: 8.9 FL (ref 5–10.5)
POTASSIUM REFLEX MAGNESIUM: 4.6 MMOL/L (ref 3.5–5.1)
PROTEIN UA: NEGATIVE MG/DL
RBC # BLD: 4.71 M/UL (ref 4.2–5.9)
SLIDE REVIEW: ABNORMAL
SODIUM BLD-SCNC: 140 MMOL/L (ref 136–145)
SPECIFIC GRAVITY UA: 1.01 (ref 1–1.03)
TOTAL PROTEIN: 6.9 G/DL (ref 6.4–8.2)
URINE REFLEX TO CULTURE: NORMAL
URINE TYPE: NORMAL
UROBILINOGEN, URINE: 0.2 E.U./DL
WBC # BLD: 7.1 K/UL (ref 4–11)

## 2019-08-28 PROCEDURE — 80053 COMPREHEN METABOLIC PANEL: CPT

## 2019-08-28 PROCEDURE — 2500000003 HC RX 250 WO HCPCS: Performed by: EMERGENCY MEDICINE

## 2019-08-28 PROCEDURE — 83690 ASSAY OF LIPASE: CPT

## 2019-08-28 PROCEDURE — 85025 COMPLETE CBC W/AUTO DIFF WBC: CPT

## 2019-08-28 PROCEDURE — 84703 CHORIONIC GONADOTROPIN ASSAY: CPT

## 2019-08-28 PROCEDURE — 96374 THER/PROPH/DIAG INJ IV PUSH: CPT

## 2019-08-28 PROCEDURE — 99284 EMERGENCY DEPT VISIT MOD MDM: CPT

## 2019-08-28 PROCEDURE — 93005 ELECTROCARDIOGRAM TRACING: CPT | Performed by: PHYSICIAN ASSISTANT

## 2019-08-28 PROCEDURE — G0328 FECAL BLOOD SCRN IMMUNOASSAY: HCPCS

## 2019-08-28 PROCEDURE — 93010 ELECTROCARDIOGRAM REPORT: CPT | Performed by: INTERNAL MEDICINE

## 2019-08-28 PROCEDURE — 81003 URINALYSIS AUTO W/O SCOPE: CPT

## 2019-08-28 RX ORDER — DICYCLOMINE HYDROCHLORIDE 10 MG/1
10 CAPSULE ORAL 4 TIMES DAILY PRN
Qty: 20 CAPSULE | Refills: 0 | Status: SHIPPED | OUTPATIENT
Start: 2019-08-28 | End: 2019-12-13

## 2019-08-28 RX ORDER — KETAMINE HCL IN NACL, ISO-OSM 100MG/10ML
0.3 SYRINGE (ML) INJECTION ONCE
Status: COMPLETED | OUTPATIENT
Start: 2019-08-28 | End: 2019-08-28

## 2019-08-28 RX ADMIN — Medication 38.1 MG: at 15:41

## 2019-08-28 ASSESSMENT — PAIN SCALES - GENERAL
PAINLEVEL_OUTOF10: 8
PAINLEVEL_OUTOF10: 7
PAINLEVEL_OUTOF10: 8
PAINLEVEL_OUTOF10: 9

## 2019-08-28 ASSESSMENT — PAIN DESCRIPTION - ORIENTATION
ORIENTATION: MID
ORIENTATION: MID

## 2019-08-28 ASSESSMENT — PAIN DESCRIPTION - FREQUENCY
FREQUENCY: CONTINUOUS
FREQUENCY: CONTINUOUS

## 2019-08-28 ASSESSMENT — PAIN SCALES - WONG BAKER: WONGBAKER_NUMERICALRESPONSE: 8

## 2019-08-28 ASSESSMENT — PAIN DESCRIPTION - DESCRIPTORS
DESCRIPTORS: CONSTANT;CRAMPING
DESCRIPTORS: CONSTANT

## 2019-08-28 ASSESSMENT — PAIN DESCRIPTION - PAIN TYPE
TYPE: CHRONIC PAIN
TYPE: CHRONIC PAIN

## 2019-08-28 ASSESSMENT — PAIN DESCRIPTION - LOCATION
LOCATION: ABDOMEN
LOCATION: ABDOMEN

## 2019-08-28 NOTE — ED PROVIDER NOTES
CARDIAC ARRHYTHMIA, thus I consider the discharge disposition reasonable. PROCEDURES:  None    FINAL IMPRESSION      1. Lightheadedness    2.  Chronic abdominal pain          DISPOSITION/PLAN   DISPOSITION Decision To Discharge 08/28/2019 04:15:34 PM      PATIENT REFERRED TO:  your pain management specialist    Go to   keep your scheduled appointment for next week      DISCHARGE MEDICATIONS:  New Prescriptions    DICYCLOMINE (BENTYL) 10 MG CAPSULE    Take 1 capsule by mouth 4 times daily as needed (abdominal cramping)       (Please note that portions of this note were completed with a voice recognition program.  Efforts were made to edit the dictations but occasionally words are mis-transcribed.)    Jarvis San, [address] [place] [address]  08/28/19 [id number]

## 2019-08-28 NOTE — ED NOTES
Discharge instructions reviewed with Pt. Pt verbalizes understanding at this time. Prescriptions/medications reviewed with pt at this time. VS WNL. Pt condition stable at this time. No concerns voiced.       Max Mercado RN  08/28/19 [id number]

## 2019-08-31 ENCOUNTER — APPOINTMENT (OUTPATIENT)
Dept: GENERAL RADIOLOGY | Age: 49
End: 2019-08-31
Payer: COMMERCIAL

## 2019-08-31 ENCOUNTER — APPOINTMENT (OUTPATIENT)
Dept: CT IMAGING | Age: 49
End: 2019-08-31
Payer: COMMERCIAL

## 2019-08-31 ENCOUNTER — HOSPITAL ENCOUNTER (EMERGENCY)
Age: 49
Discharge: HOME | End: 2019-09-01
Payer: COMMERCIAL

## 2019-08-31 DIAGNOSIS — G89.29 ABDOMINAL PAIN, CHRONIC, GENERALIZED: Primary | ICD-10-CM

## 2019-08-31 DIAGNOSIS — G89.29 CHRONIC ABDOMINAL PAIN: ICD-10-CM

## 2019-08-31 DIAGNOSIS — R10.9 ACUTE ABDOMINAL PAIN: ICD-10-CM

## 2019-08-31 DIAGNOSIS — R10.84 ABDOMINAL PAIN, CHRONIC, GENERALIZED: Primary | ICD-10-CM

## 2019-08-31 DIAGNOSIS — R10.9 CHRONIC ABDOMINAL PAIN: ICD-10-CM

## 2019-08-31 LAB
A/G RATIO: 1.8 (ref 1.1–2.2)
ALBUMIN SERPL-MCNC: 4.2 G/DL (ref 3.4–5)
ALP BLD-CCNC: 93 U/L (ref 40–129)
ALT SERPL-CCNC: 12 U/L (ref 10–40)
AMPHETAMINE SCREEN, URINE: NORMAL
ANION GAP SERPL CALCULATED.3IONS-SCNC: 13 MMOL/L (ref 3–16)
AST SERPL-CCNC: 12 U/L (ref 15–37)
BARBITURATE SCREEN URINE: NORMAL
BASOPHILS ABSOLUTE: 0.1 K/UL (ref 0–0.2)
BASOPHILS RELATIVE PERCENT: 1 %
BENZODIAZEPINE SCREEN, URINE: NORMAL
BILIRUB SERPL-MCNC: 0.3 MG/DL (ref 0–1)
BUN BLDV-MCNC: 7 MG/DL (ref 7–20)
CALCIUM SERPL-MCNC: 9.1 MG/DL (ref 8.3–10.6)
CANNABINOID SCREEN URINE: NORMAL
CHLORIDE BLD-SCNC: 104 MMOL/L (ref 99–110)
CO2: 24 MMOL/L (ref 21–32)
COCAINE METABOLITE SCREEN URINE: NORMAL
CREAT SERPL-MCNC: 1.2 MG/DL (ref 0.9–1.3)
EOSINOPHILS ABSOLUTE: 0.4 K/UL (ref 0–0.6)
EOSINOPHILS RELATIVE PERCENT: 4.8 %
GFR AFRICAN AMERICAN: >60
GFR NON-AFRICAN AMERICAN: >60
GLOBULIN: 2.4 G/DL
GLUCOSE BLD-MCNC: 80 MG/DL (ref 70–99)
HCT VFR BLD CALC: 42 % (ref 40.5–52.5)
HEMOGLOBIN: 14 G/DL (ref 13.5–17.5)
LIPASE: 33 U/L (ref 13–60)
LYMPHOCYTES ABSOLUTE: 4.1 K/UL (ref 1–5.1)
LYMPHOCYTES RELATIVE PERCENT: 44.7 %
Lab: NORMAL
MCH RBC QN AUTO: 29.9 PG (ref 26–34)
MCHC RBC AUTO-ENTMCNC: 33.3 G/DL (ref 31–36)
MCV RBC AUTO: 89.9 FL (ref 80–100)
METHADONE SCREEN, URINE: NORMAL
MONOCYTES ABSOLUTE: 0.7 K/UL (ref 0–1.3)
MONOCYTES RELATIVE PERCENT: 7.3 %
NEUTROPHILS ABSOLUTE: 3.9 K/UL (ref 1.7–7.7)
NEUTROPHILS RELATIVE PERCENT: 42.2 %
OPIATE SCREEN URINE: NORMAL
OXYCODONE URINE: NORMAL
PDW BLD-RTO: 15.6 % (ref 12.4–15.4)
PH UA: 5.5
PHENCYCLIDINE SCREEN URINE: NORMAL
PLATELET # BLD: 340 K/UL (ref 135–450)
PMV BLD AUTO: 8.2 FL (ref 5–10.5)
POTASSIUM SERPL-SCNC: 4.1 MMOL/L (ref 3.5–5.1)
PROPOXYPHENE SCREEN: NORMAL
RBC # BLD: 4.67 M/UL (ref 4.2–5.9)
SODIUM BLD-SCNC: 141 MMOL/L (ref 136–145)
TOTAL PROTEIN: 6.6 G/DL (ref 6.4–8.2)
WBC # BLD: 9.2 K/UL (ref 4–11)

## 2019-08-31 PROCEDURE — 85025 COMPLETE CBC W/AUTO DIFF WBC: CPT

## 2019-08-31 PROCEDURE — 80053 COMPREHEN METABOLIC PANEL: CPT

## 2019-08-31 PROCEDURE — 83690 ASSAY OF LIPASE: CPT

## 2019-08-31 PROCEDURE — 74022 RADEX COMPL AQT ABD SERIES: CPT

## 2019-08-31 PROCEDURE — 80307 DRUG TEST PRSMV CHEM ANLYZR: CPT

## 2019-08-31 PROCEDURE — 74176 CT ABD & PELVIS W/O CONTRAST: CPT

## 2019-08-31 PROCEDURE — 99284 EMERGENCY DEPT VISIT MOD MDM: CPT

## 2019-08-31 RX ORDER — KETAMINE HCL IN NACL, ISO-OSM 100MG/10ML
20 SYRINGE (ML) INJECTION ONCE
Status: COMPLETED | OUTPATIENT
Start: 2019-08-31 | End: 2019-09-01

## 2019-08-31 ASSESSMENT — PAIN DESCRIPTION - FREQUENCY: FREQUENCY: CONTINUOUS

## 2019-08-31 ASSESSMENT — PAIN DESCRIPTION - PAIN TYPE: TYPE: ACUTE PAIN

## 2019-08-31 ASSESSMENT — PAIN SCALES - GENERAL: PAINLEVEL_OUTOF10: 10

## 2019-08-31 ASSESSMENT — PAIN DESCRIPTION - ONSET: ONSET: ON-GOING

## 2019-08-31 ASSESSMENT — PAIN DESCRIPTION - DESCRIPTORS: DESCRIPTORS: SHARP

## 2019-08-31 ASSESSMENT — PAIN DESCRIPTION - ORIENTATION: ORIENTATION: RIGHT;LOWER

## 2019-08-31 ASSESSMENT — PAIN DESCRIPTION - LOCATION: LOCATION: ABDOMEN

## 2019-08-31 ASSESSMENT — PAIN - FUNCTIONAL ASSESSMENT: PAIN_FUNCTIONAL_ASSESSMENT: ACTIVITIES ARE NOT PREVENTED

## 2019-08-31 ASSESSMENT — PAIN DESCRIPTION - PROGRESSION: CLINICAL_PROGRESSION: GRADUALLY WORSENING

## 2019-09-01 VITALS
SYSTOLIC BLOOD PRESSURE: 111 MMHG | HEIGHT: 72 IN | DIASTOLIC BLOOD PRESSURE: 75 MMHG | HEART RATE: 57 BPM | BODY MASS INDEX: 37.71 KG/M2 | TEMPERATURE: 97.8 F | RESPIRATION RATE: 14 BRPM | OXYGEN SATURATION: 96 % | WEIGHT: 278.44 LBS

## 2019-09-01 PROCEDURE — 96374 THER/PROPH/DIAG INJ IV PUSH: CPT

## 2019-09-01 PROCEDURE — 2500000003 HC RX 250 WO HCPCS: Performed by: EMERGENCY MEDICINE

## 2019-09-01 RX ADMIN — Medication 20 MG: at 00:10

## 2019-09-01 ASSESSMENT — PAIN DESCRIPTION - ONSET: ONSET: ON-GOING

## 2019-09-01 ASSESSMENT — PAIN SCALES - GENERAL
PAINLEVEL_OUTOF10: 10
PAINLEVEL_OUTOF10: 5

## 2019-09-01 ASSESSMENT — PAIN DESCRIPTION - FREQUENCY: FREQUENCY: CONTINUOUS

## 2019-09-01 ASSESSMENT — PAIN DESCRIPTION - PROGRESSION: CLINICAL_PROGRESSION: GRADUALLY IMPROVING

## 2019-09-01 ASSESSMENT — PAIN - FUNCTIONAL ASSESSMENT: PAIN_FUNCTIONAL_ASSESSMENT: ACTIVITIES ARE NOT PREVENTED

## 2019-09-01 ASSESSMENT — PAIN DESCRIPTION - LOCATION: LOCATION: ABDOMEN

## 2019-09-01 ASSESSMENT — PAIN DESCRIPTION - ORIENTATION: ORIENTATION: RIGHT

## 2019-09-01 ASSESSMENT — PAIN DESCRIPTION - DESCRIPTORS: DESCRIPTORS: SHARP

## 2019-09-01 ASSESSMENT — PAIN DESCRIPTION - PAIN TYPE: TYPE: ACUTE PAIN

## 2019-09-01 NOTE — ED PROVIDER NOTES
He does have an appointment next week. If his condition worsens or new symptoms develop such as fever, increased pain, vomiting, diarrhea, bloody stool, etc., he was advised to return immediately to the emergency department. MDM      CRITICAL CARE TIME   Total Critical Care time was 0 minutes, excluding separately reportable procedures. There was a high probability of clinically significant/life threatening deterioration in the patient's condition which required my urgent intervention. CONSULTS:  None    PROCEDURES:  Unless otherwise noted below, none     Procedures        FINAL IMPRESSION      1. Abdominal pain, chronic, generalized    2. Acute abdominal pain          DISPOSITION/PLAN   DISPOSITION        PATIENT REFERRED TO:  No follow-up provider specified. DISCHARGE MEDICATIONS:  New Prescriptions    No medications on file     Controlled Substances Monitoring:     RX Monitoring 3/1/2019   Attestation The Prescription Monitoring Report for this patient was reviewed today. Periodic Controlled Substance Monitoring Possible medication side effects, risk of tolerance/dependence & alternative treatments discussed. ;No signs of potential drug abuse or diversion identified. (Please note that portions of this note were completed with a voice recognition program.  Efforts were made to edit the dictations but occasionally words are mis-transcribed. )    [address] Alonso DO (electronically signed)  Attending Emergency Physician      Sudhir Rosen DO  08/31/19 [id number]

## 2019-09-01 NOTE — ED PROVIDER NOTES
CAPSULE BY MOUTH DAILY    MENINGOCOCCAL B VAC, RECOMB, JT    0.5 ml IM x1 (repeat at 1-2 months and 6  Months)    MULTIPLE VITAMIN TABS    Take 1 tablet by mouth daily     PANTOPRAZOLE (PROTONIX) 40 MG TABLET    Take 1 tablet by mouth daily    SILDENAFIL (REVATIO) 20 MG TABLET    Take 4 tablets by mouth as needed (30 min prior to intercourse)    TRAZODONE (DESYREL) 100 MG TABLET    Take 2 tablets by mouth nightly         Review of Systems:  Review of Systems   Constitutional: Negative for chills and fever. HENT: Negative for congestion, facial swelling and sneezing. Eyes: Negative for discharge and redness. Respiratory: Negative for apnea, choking and shortness of breath. Cardiovascular: Negative for chest pain. Gastrointestinal: Positive for abdominal pain. Negative for nausea and vomiting. Genitourinary: Negative for dysuria. Musculoskeletal: Negative for back pain, neck pain and neck stiffness. Neurological: Negative for dizziness, tremors, seizures and headaches. All other systems reviewed and are negative. Positives and Pertinent negatives as per HPI. Except as noted above in the ROS, problem specific ROS was completed and is negative. Physical Exam:  Physical Exam   Constitutional: He is oriented to person, place, and time. He appears well-developed and well-nourished. HENT:   Head: Normocephalic and atraumatic. Nose: Nose normal.   Eyes: Right eye exhibits no discharge. Left eye exhibits no discharge. Neck: Normal range of motion. Neck supple. Cardiovascular: Normal rate, regular rhythm and normal heart sounds. Exam reveals no gallop and no friction rub. No murmur heard. Pulmonary/Chest: Effort normal and breath sounds normal. No respiratory distress. He has no wheezes. He has no rales. He exhibits no tenderness. Abdominal: Soft. Normal appearance and bowel sounds are normal. He exhibits no distension and no mass. There is generalized tenderness.  There is no rebound and no guarding. Musculoskeletal: Normal range of motion. Neurological: He is alert and oriented to person, place, and time. Skin: Skin is warm and dry. He is not diaphoretic. Psychiatric: He has a normal mood and affect. His behavior is normal.   Nursing note and vitals reviewed. MEDICAL DECISION MAKING    Vitals:    Vitals:    09/01/19 0016 09/01/19 0032 09/01/19 0047 09/01/19 0102   BP: 124/82 126/83 116/70 104/69   Pulse: 62 66 60 61   Resp: 9 18 17 18   Temp:       TempSrc:       SpO2: 93% 95% 95% 95%   Weight:       Height:           LABS:  Labs Reviewed   CBC WITH AUTO DIFFERENTIAL - Abnormal; Notable for the following components:       Result Value    RDW 15.6 (*)     All other components within normal limits    Narrative:     Performed at:  [address] [email or web address] [address]   Phone [phone]   COMPREHENSIVE METABOLIC PANEL - Abnormal; Notable for the following components:    AST 12 (*)     All other components within normal limits    Narrative:     Performed at:  [facility]  [address] [place] [facility] [email or web address] [address]   Phone [phone]   URINE DRUG SCREEN    Narrative:     Performed at:  [address] [email or web address] [address]   Phone [phone]   LIPASE    Narrative:     Performed at:  [facility]  [address] [facility] [email or web address] [address]   Phone [phone] of labs reviewed and werenegative at this time or not returned at the time of this note.     RADIOLOGY:   Non-plain film images such as CT, Ultrasound and MRI are read by the radiologist. Aliya Keith PA-C have directly visualized the radiologic plain film image(s) with the below findings:        Interpretation per the Radiologist below, if available at the time of thisnote:    CT ABDOMEN PELVIS WO CONTRAST   Preliminary Result   No

## 2019-09-06 ENCOUNTER — OFFICE VISIT (OUTPATIENT)
Dept: PULMONOLOGY | Age: 49
End: 2019-09-06
Payer: COMMERCIAL

## 2019-09-06 VITALS
SYSTOLIC BLOOD PRESSURE: 116 MMHG | HEIGHT: 72 IN | HEART RATE: 65 BPM | DIASTOLIC BLOOD PRESSURE: 72 MMHG | OXYGEN SATURATION: 99 % | BODY MASS INDEX: 37.11 KG/M2 | WEIGHT: 274 LBS

## 2019-09-06 DIAGNOSIS — G47.33 OBSTRUCTIVE SLEEP APNEA: Primary | Chronic | ICD-10-CM

## 2019-09-06 DIAGNOSIS — E66.2 CLASS 2 OBESITY WITH ALVEOLAR HYPOVENTILATION WITHOUT SERIOUS COMORBIDITY WITH BODY MASS INDEX (BMI) OF 37.0 TO 37.9 IN ADULT (HCC): ICD-10-CM

## 2019-09-06 DIAGNOSIS — K21.00 GASTROESOPHAGEAL REFLUX DISEASE WITH ESOPHAGITIS: ICD-10-CM

## 2019-09-06 DIAGNOSIS — F51.04 PSYCHOPHYSIOLOGICAL INSOMNIA: Chronic | ICD-10-CM

## 2019-09-06 DIAGNOSIS — J30.2 SEASONAL ALLERGIC RHINITIS, UNSPECIFIED TRIGGER: Chronic | ICD-10-CM

## 2019-09-06 PROCEDURE — 99214 OFFICE O/P EST MOD 30 MIN: CPT | Performed by: NURSE PRACTITIONER

## 2019-09-06 RX ORDER — ZOLPIDEM TARTRATE 10 MG/1
10 TABLET ORAL NIGHTLY PRN
Qty: 30 TABLET | Refills: 5 | Status: SHIPPED | OUTPATIENT
Start: 2019-09-06 | End: 2019-10-06

## 2019-09-06 ASSESSMENT — SLEEP AND FATIGUE QUESTIONNAIRES
HOW LIKELY ARE YOU TO NOD OFF OR FALL ASLEEP WHILE LYING DOWN TO REST IN THE AFTERNOON WHEN CIRCUMSTANCES PERMIT: 1
HOW LIKELY ARE YOU TO NOD OFF OR FALL ASLEEP IN A CAR, WHILE STOPPED FOR A FEW MINUTES IN TRAFFIC: 0
HOW LIKELY ARE YOU TO NOD OFF OR FALL ASLEEP WHILE SITTING QUIETLY AFTER LUNCH WITHOUT ALCOHOL: 0
HOW LIKELY ARE YOU TO NOD OFF OR FALL ASLEEP WHILE SITTING AND READING: 1
HOW LIKELY ARE YOU TO NOD OFF OR FALL ASLEEP WHILE SITTING AND TALKING TO SOMEONE: 0
HOW LIKELY ARE YOU TO NOD OFF OR FALL ASLEEP WHILE SITTING INACTIVE IN A PUBLIC PLACE: 0
HOW LIKELY ARE YOU TO NOD OFF OR FALL ASLEEP WHILE WATCHING TV: 1
HOW LIKELY ARE YOU TO NOD OFF OR FALL ASLEEP WHEN YOU ARE A PASSENGER IN A CAR FOR AN HOUR WITHOUT A BREAK: 0
ESS TOTAL SCORE: 3

## 2019-09-06 NOTE — PROGRESS NOTES
Ambien CR- 10mg feeling groggy will return to Ambien 10mg nightly    Understands how to change humidification and/or tubing temperature for comfort while at home  [x] Yes  [] No     Difficulties falling asleep  [] Yes  [x] No   Difficulties staying asleep  [] Yes  [x] No   Approximate time to bed  9-10pm   Approximate wake time  6:30-8am   Taking Naps  no   If taking naps usual length    [x] NA   If taking naps using the machine  [] Yes  [] No  [x] NA   Drowsy when driving  [] Yes  [x] No     Does patient carry a DOT/CDL  [] Yes  [x] No     Does patient carry FAA/P[employer] License   [] Yes  [x] No      Any concerns noted with the machine at this time  [] Yes  [x] No        Diagnosis Orders   1. Obstructive sleep apnea,Severe -(on cpap)     2. Class 2 obesity with alveolar hypoventilation without serious comorbidity with body mass index (BMI) of 37.0 to 37.9 in adult ([facility]     3. Gastroesophageal reflux disease with esophagitis--on daily ppi     4. Psychophysiological insomnia  zolpidem (AMBIEN) 10 MG tablet   5. Seasonal allergic rhinitis, unspecified trigger         The chronic medical conditions listed are directly related to the primary diagnosis listed above. The management of the primary diagnosis affects the secondary diagnosis and vice versa.       Review of Systems    Social History     Socioeconomic History    Marital status:      Spouse name: Kesha Recinos Number of children: 2    Years of education: Not on file    Highest education level: Not on file   Occupational History    Occupation:  --sales at [employer] resource strain: Not on file    Food insecurity:     Worry: Not on file     Inability: Not on file   [place] needs:     Medical: Not on file     Non-medical: Not on file   Tobacco Use    Smoking status: Former Smoker     Packs/day: 0.50     Years: 25.00     Pack years: 12.50     Types: Cigarettes     Last attempt to quit: 7/31/2017

## 2019-09-07 ASSESSMENT — ENCOUNTER SYMPTOMS
EYE REDNESS: 0
FACIAL SWELLING: 0
APNEA: 0
SHORTNESS OF BREATH: 0
NAUSEA: 0
ABDOMINAL PAIN: 1
EYE DISCHARGE: 0
CHOKING: 0
BACK PAIN: 0
VOMITING: 0

## 2019-09-10 ENCOUNTER — OFFICE VISIT (OUTPATIENT)
Dept: ORTHOPEDIC SURGERY | Age: 49
End: 2019-09-10
Payer: COMMERCIAL

## 2019-09-10 VITALS
BODY MASS INDEX: 37.93 KG/M2 | HEART RATE: 65 BPM | HEIGHT: 72 IN | WEIGHT: 280 LBS | DIASTOLIC BLOOD PRESSURE: 67 MMHG | TEMPERATURE: 99.5 F | SYSTOLIC BLOOD PRESSURE: 104 MMHG

## 2019-09-10 DIAGNOSIS — M25.561 RIGHT KNEE PAIN, UNSPECIFIED CHRONICITY: Primary | ICD-10-CM

## 2019-09-10 DIAGNOSIS — Z96.651 TOTAL KNEE REPLACEMENT STATUS, RIGHT: ICD-10-CM

## 2019-09-10 DIAGNOSIS — M25.361 INSTABILITY OF KNEE JOINT, RIGHT: ICD-10-CM

## 2019-09-10 PROCEDURE — L1812 KO ELASTIC W/JOINTS PRE OTS: HCPCS | Performed by: PHYSICIAN ASSISTANT

## 2019-09-10 PROCEDURE — 99202 OFFICE O/P NEW SF 15 MIN: CPT | Performed by: PHYSICIAN ASSISTANT

## 2019-09-11 PROBLEM — M25.361 INSTABILITY OF KNEE JOINT, RIGHT: Status: ACTIVE | Noted: 2019-09-11

## 2019-09-11 PROBLEM — Z96.651 TOTAL KNEE REPLACEMENT STATUS, RIGHT: Status: ACTIVE | Noted: 2019-09-11

## 2019-09-11 NOTE — PROGRESS NOTES
for Sleep for up to 30 days. 30 tablet 5    dicyclomine (BENTYL) 10 MG capsule Take 1 capsule by mouth 4 times daily as needed (abdominal cramping) 20 capsule 0    Meningococcal B Vac, Recomb, JT 0.5 ml IM x1 (repeat at 1-2 months and 6  Months) 0.5 mL 2    FLUoxetine (PROZAC) 20 MG capsule TAKE 1 CAPSULE BY MOUTH DAILY 90 capsule 0    atorvastatin (LIPITOR) 40 MG tablet TAKE 1 TABLET BY MOUTH EVERY NIGHT AT BEDTIME 30 tablet 5    sildenafil (REVATIO) 20 MG tablet Take 4 tablets by mouth as needed (30 min prior to intercourse) 30 tablet 5    pantoprazole (PROTONIX) 40 MG tablet Take 1 tablet by mouth daily 30 tablet 3    aspirin 81 MG EC tablet Take 81 mg by mouth      calcium-vitamin D (OSCAL 500/200 D-3) 500-200 MG-UNIT per tablet Take 1 tablet by mouth 2 times daily       Multiple Vitamin TABS Take 1 tablet by mouth daily        No current facility-administered medications for this visit. Objective:     He is alert, oriented x 3, pleasant, well nourished, developed and in no   acute distress. /67   Pulse 65   Temp 99.5 °F (37.5 °C) (Temporal)   Ht 6' (1.829 m)   Wt 280 lb (127 kg)   BMI 37.97 kg/m²      Examination of his right knee arthroplasty:  He has a well-healed midline incision. There is no erythema. There are no open wounds. He has full extension and flexion to about 120 degrees. There is no crepitus with range of motion testing. There is 1+ varus valgus laxity as well as 1+ anterior drawer laxity. Extensor mechanism is intact. Examination of the lower extremities are intact with sensation to light touch. Motor testing  5/5 in all major motor groups of the lower extremities. Gait is normal heel to toe. Gait is antalgic. Negative Wei's Sign. SLR negative. Examination of the lower extremities shows intact perfusion to all extremities. No cyanosis. Digits are warm to touch, capillary refill is less than 2 seconds. There is no edema noted.

## 2019-09-23 ENCOUNTER — PATIENT MESSAGE (OUTPATIENT)
Dept: PSYCHIATRY | Age: 49
End: 2019-09-23

## 2019-09-23 RX ORDER — TRAZODONE HYDROCHLORIDE 100 MG/1
200 TABLET ORAL NIGHTLY
Qty: 180 TABLET | Refills: 0 | Status: SHIPPED | OUTPATIENT
Start: 2019-09-23 | End: 2019-12-02 | Stop reason: SDUPTHER

## 2019-09-23 NOTE — TELEPHONE ENCOUNTER
From: Kg Reynolds  To: Yarelis Zuluaga MD  Sent: 9/23/2019 9:56 AM EDT  Subject: Non-Urgent Medical Question    Hi Dr. Chayo White sent, or at least they should have sent, a refill request for both the Prozac and the 100mg Trazodone. Looks like the pharmacy has the refill for Prozac but not the Trazodone.  Just wanted to be sure it wasn't overlooked or, more likely, they didn't send over the request.    Thanks as always,  Adria Pro

## 2019-10-22 ENCOUNTER — TELEPHONE (OUTPATIENT)
Dept: FAMILY MEDICINE CLINIC | Age: 49
End: 2019-10-22

## 2019-10-29 ENCOUNTER — OFFICE VISIT (OUTPATIENT)
Dept: ORTHOPEDIC SURGERY | Age: 49
End: 2019-10-29
Payer: COMMERCIAL

## 2019-10-29 VITALS
DIASTOLIC BLOOD PRESSURE: 71 MMHG | HEART RATE: 86 BPM | WEIGHT: 267.6 LBS | TEMPERATURE: 98.1 F | HEIGHT: 72 IN | SYSTOLIC BLOOD PRESSURE: 114 MMHG | BODY MASS INDEX: 36.24 KG/M2

## 2019-10-29 DIAGNOSIS — Z96.651 TOTAL KNEE REPLACEMENT STATUS, RIGHT: Primary | ICD-10-CM

## 2019-10-29 PROCEDURE — 99213 OFFICE O/P EST LOW 20 MIN: CPT | Performed by: PHYSICIAN ASSISTANT

## 2019-10-31 ENCOUNTER — OFFICE VISIT (OUTPATIENT)
Dept: ORTHOPEDIC SURGERY | Age: 49
End: 2019-10-31
Payer: COMMERCIAL

## 2019-10-31 VITALS
HEART RATE: 61 BPM | BODY MASS INDEX: 36.16 KG/M2 | DIASTOLIC BLOOD PRESSURE: 73 MMHG | WEIGHT: 267 LBS | SYSTOLIC BLOOD PRESSURE: 116 MMHG | TEMPERATURE: 98 F | HEIGHT: 72 IN

## 2019-10-31 DIAGNOSIS — M25.361 INSTABILITY OF KNEE JOINT, RIGHT: Primary | ICD-10-CM

## 2019-10-31 DIAGNOSIS — Z96.651 TOTAL KNEE REPLACEMENT STATUS, RIGHT: ICD-10-CM

## 2019-10-31 PROCEDURE — 99214 OFFICE O/P EST MOD 30 MIN: CPT | Performed by: ORTHOPAEDIC SURGERY

## 2019-11-25 ENCOUNTER — PRE-EVALUATION (OUTPATIENT)
Dept: ORTHOPEDIC SURGERY | Age: 49
End: 2019-11-25

## 2019-11-25 ENCOUNTER — PREP FOR PROCEDURE (OUTPATIENT)
Dept: ORTHOPEDIC SURGERY | Age: 49
End: 2019-11-25

## 2019-11-25 DIAGNOSIS — M25.361 INSTABILITY OF KNEE JOINT, RIGHT: Primary | ICD-10-CM

## 2019-11-28 ENCOUNTER — PATIENT MESSAGE (OUTPATIENT)
Dept: PSYCHIATRY | Age: 49
End: 2019-11-28

## 2019-12-02 RX ORDER — TRAZODONE HYDROCHLORIDE 100 MG/1
200 TABLET ORAL NIGHTLY
Qty: 180 TABLET | Refills: 0 | Status: SHIPPED | OUTPATIENT
Start: 2019-12-02 | End: 2020-03-11 | Stop reason: SDUPTHER

## 2019-12-03 ENCOUNTER — HOSPITAL ENCOUNTER (OUTPATIENT)
Dept: PREADMISSION TESTING | Age: 49
Discharge: HOME | End: 2019-12-07
Payer: COMMERCIAL

## 2019-12-03 DIAGNOSIS — M25.361 INSTABILITY OF KNEE JOINT, RIGHT: ICD-10-CM

## 2019-12-03 LAB
ABO/RH: NORMAL
ALBUMIN SERPL-MCNC: 4.4 G/DL (ref 3.4–5)
ANION GAP SERPL CALCULATED.3IONS-SCNC: 14 MMOL/L (ref 3–16)
ANTIBODY SCREEN: NORMAL
APTT: 42.2 SEC (ref 24.2–36.2)
BASOPHILS ABSOLUTE: 0.1 K/UL (ref 0–0.2)
BASOPHILS RELATIVE PERCENT: 0.7 %
BILIRUBIN URINE: NEGATIVE
BLOOD, URINE: NEGATIVE
BUN BLDV-MCNC: 6 MG/DL (ref 7–20)
CALCIUM SERPL-MCNC: 9.6 MG/DL (ref 8.3–10.6)
CHLORIDE BLD-SCNC: 102 MMOL/L (ref 99–110)
CLARITY: CLEAR
CO2: 24 MMOL/L (ref 21–32)
COLOR: YELLOW
CREAT SERPL-MCNC: 1.3 MG/DL (ref 0.9–1.3)
EOSINOPHILS ABSOLUTE: 0.4 K/UL (ref 0–0.6)
EOSINOPHILS RELATIVE PERCENT: 5.6 %
EPITHELIAL CELLS, UA: 0 /HPF (ref 0–5)
ESTIMATED AVERAGE GLUCOSE: 102.5 MG/DL
GFR AFRICAN AMERICAN: >60
GFR NON-AFRICAN AMERICAN: 59
GLUCOSE BLD-MCNC: 95 MG/DL (ref 70–99)
GLUCOSE URINE: NEGATIVE MG/DL
HBA1C MFR BLD: 5.2 %
HCT VFR BLD CALC: 46.6 % (ref 40.5–52.5)
HEMOGLOBIN: 15.2 G/DL (ref 13.5–17.5)
HYALINE CASTS: 0 /LPF (ref 0–8)
INR BLD: 1.1 (ref 0.86–1.14)
KETONES, URINE: NEGATIVE MG/DL
LEUKOCYTE ESTERASE, URINE: ABNORMAL
LYMPHOCYTES ABSOLUTE: 1.9 K/UL (ref 1–5.1)
LYMPHOCYTES RELATIVE PERCENT: 26.4 %
MCH RBC QN AUTO: 30.1 PG (ref 26–34)
MCHC RBC AUTO-ENTMCNC: 32.6 G/DL (ref 31–36)
MCV RBC AUTO: 92.4 FL (ref 80–100)
MICROSCOPIC EXAMINATION: YES
MONOCYTES ABSOLUTE: 0.5 K/UL (ref 0–1.3)
MONOCYTES RELATIVE PERCENT: 7.3 %
NEUTROPHILS ABSOLUTE: 4.3 K/UL (ref 1.7–7.7)
NEUTROPHILS RELATIVE PERCENT: 60 %
NITRITE, URINE: NEGATIVE
PDW BLD-RTO: 15.1 % (ref 12.4–15.4)
PH UA: 6.5 (ref 5–8)
PLATELET # BLD: 353 K/UL (ref 135–450)
PMV BLD AUTO: 9 FL (ref 5–10.5)
POTASSIUM SERPL-SCNC: 4.6 MMOL/L (ref 3.5–5.1)
PREALBUMIN: 20.1 MG/DL (ref 20–40)
PROTEIN UA: NEGATIVE MG/DL
PROTHROMBIN TIME: 12.8 SEC (ref 10–13.2)
RBC # BLD: 5.05 M/UL (ref 4.2–5.9)
RBC UA: 0 /HPF (ref 0–4)
SEDIMENTATION RATE, ERYTHROCYTE: 4 MM/HR (ref 0–15)
SODIUM BLD-SCNC: 140 MMOL/L (ref 136–145)
SPECIFIC GRAVITY UA: <1.005 (ref 1–1.03)
URINE REFLEX TO CULTURE: YES
URINE TYPE: ABNORMAL
UROBILINOGEN, URINE: 0.2 E.U./DL
VITAMIN D 25-HYDROXY: 45.5 NG/ML
WBC # BLD: 7.2 K/UL (ref 4–11)
WBC UA: 1 /HPF (ref 0–5)

## 2019-12-03 PROCEDURE — 86900 BLOOD TYPING SEROLOGIC ABO: CPT

## 2019-12-03 PROCEDURE — 85652 RBC SED RATE AUTOMATED: CPT

## 2019-12-03 PROCEDURE — 82040 ASSAY OF SERUM ALBUMIN: CPT

## 2019-12-03 PROCEDURE — 86850 RBC ANTIBODY SCREEN: CPT

## 2019-12-03 PROCEDURE — 80048 BASIC METABOLIC PNL TOTAL CA: CPT

## 2019-12-03 PROCEDURE — 84134 ASSAY OF PREALBUMIN: CPT

## 2019-12-03 PROCEDURE — 87086 URINE CULTURE/COLONY COUNT: CPT

## 2019-12-03 PROCEDURE — 86901 BLOOD TYPING SEROLOGIC RH(D): CPT

## 2019-12-03 PROCEDURE — 85025 COMPLETE CBC W/AUTO DIFF WBC: CPT

## 2019-12-03 PROCEDURE — 81001 URINALYSIS AUTO W/SCOPE: CPT

## 2019-12-03 PROCEDURE — 82306 VITAMIN D 25 HYDROXY: CPT

## 2019-12-03 PROCEDURE — 87641 MR-STAPH DNA AMP PROBE: CPT

## 2019-12-03 PROCEDURE — 85610 PROTHROMBIN TIME: CPT

## 2019-12-03 PROCEDURE — 85730 THROMBOPLASTIN TIME PARTIAL: CPT

## 2019-12-03 PROCEDURE — 83036 HEMOGLOBIN GLYCOSYLATED A1C: CPT

## 2019-12-04 LAB
MRSA SCREEN RT-PCR: NORMAL
URINE CULTURE, ROUTINE: NORMAL

## 2019-12-05 ENCOUNTER — OFFICE VISIT (OUTPATIENT)
Dept: ORTHOPEDIC SURGERY | Age: 49
End: 2019-12-05
Payer: COMMERCIAL

## 2019-12-05 VITALS
WEIGHT: 267 LBS | DIASTOLIC BLOOD PRESSURE: 70 MMHG | SYSTOLIC BLOOD PRESSURE: 117 MMHG | HEIGHT: 72 IN | TEMPERATURE: 97.6 F | RESPIRATION RATE: 16 BRPM | HEART RATE: 58 BPM | BODY MASS INDEX: 36.16 KG/M2

## 2019-12-05 DIAGNOSIS — M25.361 INSTABILITY OF KNEE JOINT, RIGHT: Primary | ICD-10-CM

## 2019-12-05 PROCEDURE — 99214 OFFICE O/P EST MOD 30 MIN: CPT | Performed by: ORTHOPAEDIC SURGERY

## 2019-12-06 ENCOUNTER — TELEPHONE (OUTPATIENT)
Dept: FAMILY MEDICINE CLINIC | Age: 49
End: 2019-12-06

## 2019-12-06 RX ORDER — OSELTAMIVIR PHOSPHATE 75 MG/1
75 CAPSULE ORAL DAILY
Qty: 7 CAPSULE | Refills: 0 | Status: SHIPPED | OUTPATIENT
Start: 2019-12-06 | End: 2019-12-13 | Stop reason: SINTOL

## 2019-12-09 ENCOUNTER — PREP FOR PROCEDURE (OUTPATIENT)
Dept: ORTHOPEDICS UNIT | Age: 49
End: 2019-12-09

## 2019-12-09 RX ORDER — OXYCODONE HYDROCHLORIDE 5 MG/1
10 TABLET ORAL ONCE
Status: CANCELLED | OUTPATIENT
Start: 2019-12-09 | End: 2019-12-09

## 2019-12-13 ENCOUNTER — OFFICE VISIT (OUTPATIENT)
Dept: FAMILY MEDICINE CLINIC | Age: 49
End: 2019-12-13
Payer: COMMERCIAL

## 2019-12-13 VITALS
DIASTOLIC BLOOD PRESSURE: 60 MMHG | BODY MASS INDEX: 35.89 KG/M2 | OXYGEN SATURATION: 98 % | TEMPERATURE: 98.6 F | HEIGHT: 72 IN | SYSTOLIC BLOOD PRESSURE: 100 MMHG | HEART RATE: 76 BPM | WEIGHT: 265 LBS

## 2019-12-13 DIAGNOSIS — I69.30 HISTORY OF CVA WITH RESIDUAL DEFICIT: ICD-10-CM

## 2019-12-13 DIAGNOSIS — C64.2 MALIGNANT NEOPLASM OF LEFT KIDNEY (HCC): ICD-10-CM

## 2019-12-13 DIAGNOSIS — M25.361 INSTABILITY OF KNEE JOINT, RIGHT: ICD-10-CM

## 2019-12-13 DIAGNOSIS — G47.33 OBSTRUCTIVE SLEEP APNEA: ICD-10-CM

## 2019-12-13 DIAGNOSIS — Z01.818 PREOP EXAMINATION: Primary | ICD-10-CM

## 2019-12-13 DIAGNOSIS — M25.561 RECURRENT PAIN OF RIGHT KNEE: ICD-10-CM

## 2019-12-13 DIAGNOSIS — G89.4 CHRONIC PAIN SYNDROME: ICD-10-CM

## 2019-12-13 PROCEDURE — 99242 OFF/OP CONSLTJ NEW/EST SF 20: CPT | Performed by: FAMILY MEDICINE

## 2019-12-13 ASSESSMENT — ENCOUNTER SYMPTOMS
ALLERGIC/IMMUNOLOGIC NEGATIVE: 1
EYES NEGATIVE: 1
RESPIRATORY NEGATIVE: 1
GASTROINTESTINAL NEGATIVE: 1

## 2019-12-16 ENCOUNTER — ANESTHESIA EVENT (OUTPATIENT)
Dept: OPERATING ROOM | Age: 49
DRG: 468 | End: 2019-12-16
Payer: COMMERCIAL

## 2019-12-16 DIAGNOSIS — Z96.651 TOTAL KNEE REPLACEMENT STATUS, RIGHT: Primary | ICD-10-CM

## 2019-12-16 PROCEDURE — MISCCOLD COLD THERAPY UNIT AND PAD: Performed by: ORTHOPAEDIC SURGERY

## 2019-12-17 ENCOUNTER — ANESTHESIA (OUTPATIENT)
Dept: OPERATING ROOM | Age: 49
DRG: 468 | End: 2019-12-17
Payer: COMMERCIAL

## 2019-12-17 ENCOUNTER — TELEPHONE (OUTPATIENT)
Dept: FAMILY MEDICINE CLINIC | Age: 49
End: 2019-12-17

## 2019-12-17 ENCOUNTER — APPOINTMENT (OUTPATIENT)
Dept: GENERAL RADIOLOGY | Age: 49
DRG: 468 | End: 2019-12-17
Attending: ORTHOPAEDIC SURGERY
Payer: COMMERCIAL

## 2019-12-17 ENCOUNTER — HOSPITAL ENCOUNTER (INPATIENT)
Age: 49
LOS: 1 days | Discharge: HOME | DRG: 468 | End: 2019-12-18
Attending: ORTHOPAEDIC SURGERY | Admitting: ORTHOPAEDIC SURGERY
Payer: COMMERCIAL

## 2019-12-17 VITALS
RESPIRATION RATE: 6 BRPM | DIASTOLIC BLOOD PRESSURE: 53 MMHG | TEMPERATURE: 96.8 F | SYSTOLIC BLOOD PRESSURE: 108 MMHG | OXYGEN SATURATION: 99 %

## 2019-12-17 DIAGNOSIS — M25.361 KNEE GIVES OUT, RIGHT: ICD-10-CM

## 2019-12-17 DIAGNOSIS — T84.012A FAILED TOTAL KNEE, RIGHT, INITIAL ENCOUNTER (HCC): Primary | ICD-10-CM

## 2019-12-17 LAB
ABO/RH: NORMAL
ANTIBODY SCREEN: NORMAL
ANTIBODY SCREEN: NORMAL
APPEARANCE FLUID: NORMAL
APTT: 40.9 SEC (ref 24.2–36.2)
CELL COUNT FLUID TYPE: NORMAL
CLOT EVALUATION: NORMAL
COLOR FLUID: YELLOW
GLUCOSE BLD-MCNC: 103 MG/DL (ref 70–99)
GLUCOSE BLD-MCNC: 142 MG/DL (ref 70–99)
LYMPHOCYTES, BODY FLUID: 50 %
MACROPHAGE FLUID: 41 %
MONOCYTE, FLUID: 5 %
NEUTROPHIL, FLUID: 4 %
NUCLEATED CELLS FLUID: 192 /CUMM
NUMBER OF CELLS COUNTED FLUID: 100
PERFORMED ON: ABNORMAL
PERFORMED ON: ABNORMAL
RBC FLUID: 153 /CUMM
VOLUME: 10 ML

## 2019-12-17 PROCEDURE — 97530 THERAPEUTIC ACTIVITIES: CPT

## 2019-12-17 PROCEDURE — 6360000002 HC RX W HCPCS: Performed by: ORTHOPAEDIC SURGERY

## 2019-12-17 PROCEDURE — 87070 CULTURE OTHR SPECIMN AEROBIC: CPT

## 2019-12-17 PROCEDURE — 0SPV0JZ REMOVAL OF SYNTHETIC SUBSTITUTE FROM RIGHT KNEE JOINT, TIBIAL SURFACE, OPEN APPROACH: ICD-10-PCS | Performed by: ORTHOPAEDIC SURGERY

## 2019-12-17 PROCEDURE — 87075 CULTR BACTERIA EXCEPT BLOOD: CPT

## 2019-12-17 PROCEDURE — 85730 THROMBOPLASTIN TIME PARTIAL: CPT

## 2019-12-17 PROCEDURE — 6360000002 HC RX W HCPCS: Performed by: ANESTHESIOLOGY

## 2019-12-17 PROCEDURE — 97166 OT EVAL MOD COMPLEX 45 MIN: CPT

## 2019-12-17 PROCEDURE — 2709999900 HC NON-CHARGEABLE SUPPLY: Performed by: ORTHOPAEDIC SURGERY

## 2019-12-17 PROCEDURE — 7100000000 HC PACU RECOVERY - FIRST 15 MIN: Performed by: ORTHOPAEDIC SURGERY

## 2019-12-17 PROCEDURE — 89051 BODY FLUID CELL COUNT: CPT

## 2019-12-17 PROCEDURE — 2580000003 HC RX 258: Performed by: ANESTHESIOLOGY

## 2019-12-17 PROCEDURE — 97116 GAIT TRAINING THERAPY: CPT

## 2019-12-17 PROCEDURE — 97162 PT EVAL MOD COMPLEX 30 MIN: CPT

## 2019-12-17 PROCEDURE — 3600000015 HC SURGERY LEVEL 5 ADDTL 15MIN: Performed by: ORTHOPAEDIC SURGERY

## 2019-12-17 PROCEDURE — 7100000001 HC PACU RECOVERY - ADDTL 15 MIN: Performed by: ORTHOPAEDIC SURGERY

## 2019-12-17 PROCEDURE — 86901 BLOOD TYPING SEROLOGIC RH(D): CPT

## 2019-12-17 PROCEDURE — 1200000000 HC SEMI PRIVATE

## 2019-12-17 PROCEDURE — 73560 X-RAY EXAM OF KNEE 1 OR 2: CPT

## 2019-12-17 PROCEDURE — 2500000003 HC RX 250 WO HCPCS: Performed by: NURSE ANESTHETIST, CERTIFIED REGISTERED

## 2019-12-17 PROCEDURE — 2580000003 HC RX 258: Performed by: ORTHOPAEDIC SURGERY

## 2019-12-17 PROCEDURE — 2720000010 HC SURG SUPPLY STERILE: Performed by: ORTHOPAEDIC SURGERY

## 2019-12-17 PROCEDURE — 6370000000 HC RX 637 (ALT 250 FOR IP): Performed by: ORTHOPAEDIC SURGERY

## 2019-12-17 PROCEDURE — 3600000005 HC SURGERY LEVEL 5 BASE: Performed by: ORTHOPAEDIC SURGERY

## 2019-12-17 PROCEDURE — 6360000002 HC RX W HCPCS: Performed by: NURSE ANESTHETIST, CERTIFIED REGISTERED

## 2019-12-17 PROCEDURE — 88300 SURGICAL PATH GROSS: CPT

## 2019-12-17 PROCEDURE — 3700000001 HC ADD 15 MINUTES (ANESTHESIA): Performed by: ORTHOPAEDIC SURGERY

## 2019-12-17 PROCEDURE — 2500000003 HC RX 250 WO HCPCS: Performed by: ORTHOPAEDIC SURGERY

## 2019-12-17 PROCEDURE — 6370000000 HC RX 637 (ALT 250 FOR IP): Performed by: ANESTHESIOLOGY

## 2019-12-17 PROCEDURE — 86900 BLOOD TYPING SEROLOGIC ABO: CPT

## 2019-12-17 PROCEDURE — C1776 JOINT DEVICE (IMPLANTABLE): HCPCS | Performed by: ORTHOPAEDIC SURGERY

## 2019-12-17 PROCEDURE — 86850 RBC ANTIBODY SCREEN: CPT

## 2019-12-17 PROCEDURE — 3700000000 HC ANESTHESIA ATTENDED CARE: Performed by: ORTHOPAEDIC SURGERY

## 2019-12-17 PROCEDURE — 87205 SMEAR GRAM STAIN: CPT

## 2019-12-17 PROCEDURE — 0SRV0JZ REPLACEMENT OF RIGHT KNEE JOINT, TIBIAL SURFACE WITH SYNTHETIC SUBSTITUTE, OPEN APPROACH: ICD-10-PCS | Performed by: ORTHOPAEDIC SURGERY

## 2019-12-17 DEVICE — IMPLANTABLE DEVICE
Type: IMPLANTABLE DEVICE | Site: KNEE | Status: NON-FUNCTIONAL
Removed: 2020-01-22

## 2019-12-17 RX ORDER — SODIUM CHLORIDE 0.9 % (FLUSH) 0.9 %
10 SYRINGE (ML) INJECTION EVERY 12 HOURS SCHEDULED
Status: DISCONTINUED | OUTPATIENT
Start: 2019-12-17 | End: 2019-12-17 | Stop reason: HOSPADM

## 2019-12-17 RX ORDER — OXYCODONE HYDROCHLORIDE 10 MG/1
10 TABLET ORAL EVERY 4 HOURS PRN
Status: DISCONTINUED | OUTPATIENT
Start: 2019-12-17 | End: 2019-12-18 | Stop reason: HOSPADM

## 2019-12-17 RX ORDER — SODIUM CHLORIDE 0.9 % (FLUSH) 0.9 %
10 SYRINGE (ML) INJECTION PRN
Status: DISCONTINUED | OUTPATIENT
Start: 2019-12-17 | End: 2019-12-18 | Stop reason: HOSPADM

## 2019-12-17 RX ORDER — PANTOPRAZOLE SODIUM 40 MG/1
40 TABLET, DELAYED RELEASE ORAL DAILY
Status: DISCONTINUED | OUTPATIENT
Start: 2019-12-17 | End: 2019-12-18 | Stop reason: HOSPADM

## 2019-12-17 RX ORDER — DEXAMETHASONE SODIUM PHOSPHATE 4 MG/ML
INJECTION, SOLUTION INTRA-ARTICULAR; INTRALESIONAL; INTRAMUSCULAR; INTRAVENOUS; SOFT TISSUE PRN
Status: DISCONTINUED | OUTPATIENT
Start: 2019-12-17 | End: 2019-12-17 | Stop reason: SDUPTHER

## 2019-12-17 RX ORDER — FENTANYL CITRATE 50 UG/ML
INJECTION, SOLUTION INTRAMUSCULAR; INTRAVENOUS PRN
Status: DISCONTINUED | OUTPATIENT
Start: 2019-12-17 | End: 2019-12-17 | Stop reason: SDUPTHER

## 2019-12-17 RX ORDER — FENTANYL CITRATE 50 UG/ML
25 INJECTION, SOLUTION INTRAMUSCULAR; INTRAVENOUS EVERY 5 MIN PRN
Status: DISCONTINUED | OUTPATIENT
Start: 2019-12-17 | End: 2019-12-17 | Stop reason: HOSPADM

## 2019-12-17 RX ORDER — DEXTROSE MONOHYDRATE 50 MG/ML
100 INJECTION, SOLUTION INTRAVENOUS PRN
Status: DISCONTINUED | OUTPATIENT
Start: 2019-12-17 | End: 2019-12-18 | Stop reason: HOSPADM

## 2019-12-17 RX ORDER — SUCCINYLCHOLINE/SOD CL,ISO/PF 200MG/10ML
SYRINGE (ML) INTRAVENOUS PRN
Status: DISCONTINUED | OUTPATIENT
Start: 2019-12-17 | End: 2019-12-17 | Stop reason: SDUPTHER

## 2019-12-17 RX ORDER — ACETAMINOPHEN 325 MG/1
650 TABLET ORAL EVERY 6 HOURS
Status: DISCONTINUED | OUTPATIENT
Start: 2019-12-17 | End: 2019-12-18 | Stop reason: HOSPADM

## 2019-12-17 RX ORDER — MIDAZOLAM HYDROCHLORIDE 1 MG/ML
INJECTION INTRAMUSCULAR; INTRAVENOUS PRN
Status: DISCONTINUED | OUTPATIENT
Start: 2019-12-17 | End: 2019-12-17 | Stop reason: SDUPTHER

## 2019-12-17 RX ORDER — DOCUSATE SODIUM 100 MG/1
100 CAPSULE, LIQUID FILLED ORAL 2 TIMES DAILY
Status: DISCONTINUED | OUTPATIENT
Start: 2019-12-17 | End: 2019-12-18 | Stop reason: HOSPADM

## 2019-12-17 RX ORDER — FLUOXETINE HYDROCHLORIDE 20 MG/1
20 CAPSULE ORAL DAILY
Status: DISCONTINUED | OUTPATIENT
Start: 2019-12-17 | End: 2019-12-18 | Stop reason: HOSPADM

## 2019-12-17 RX ORDER — SENNA AND DOCUSATE SODIUM 50; 8.6 MG/1; MG/1
1 TABLET, FILM COATED ORAL 2 TIMES DAILY
Status: DISCONTINUED | OUTPATIENT
Start: 2019-12-17 | End: 2019-12-18 | Stop reason: HOSPADM

## 2019-12-17 RX ORDER — ONDANSETRON 2 MG/ML
INJECTION INTRAMUSCULAR; INTRAVENOUS PRN
Status: DISCONTINUED | OUTPATIENT
Start: 2019-12-17 | End: 2019-12-17 | Stop reason: SDUPTHER

## 2019-12-17 RX ORDER — ONDANSETRON 2 MG/ML
4 INJECTION INTRAMUSCULAR; INTRAVENOUS
Status: DISCONTINUED | OUTPATIENT
Start: 2019-12-17 | End: 2019-12-17 | Stop reason: HOSPADM

## 2019-12-17 RX ORDER — VANCOMYCIN HYDROCHLORIDE 1 G/20ML
INJECTION, POWDER, LYOPHILIZED, FOR SOLUTION INTRAVENOUS
Status: COMPLETED | OUTPATIENT
Start: 2019-12-17 | End: 2019-12-17

## 2019-12-17 RX ORDER — SODIUM CHLORIDE 9 MG/ML
INJECTION, SOLUTION INTRAVENOUS CONTINUOUS
Status: DISCONTINUED | OUTPATIENT
Start: 2019-12-17 | End: 2019-12-17

## 2019-12-17 RX ORDER — INSULIN GLARGINE 100 [IU]/ML
0.25 INJECTION, SOLUTION SUBCUTANEOUS NIGHTLY
Status: DISCONTINUED | OUTPATIENT
Start: 2019-12-17 | End: 2019-12-17

## 2019-12-17 RX ORDER — OXYCODONE HYDROCHLORIDE 5 MG/1
10 TABLET ORAL ONCE
Status: COMPLETED | OUTPATIENT
Start: 2019-12-17 | End: 2019-12-17

## 2019-12-17 RX ORDER — SODIUM CHLORIDE 0.9 % (FLUSH) 0.9 %
10 SYRINGE (ML) INJECTION EVERY 12 HOURS SCHEDULED
Status: DISCONTINUED | OUTPATIENT
Start: 2019-12-17 | End: 2019-12-18 | Stop reason: HOSPADM

## 2019-12-17 RX ORDER — ROCURONIUM BROMIDE 10 MG/ML
INJECTION, SOLUTION INTRAVENOUS PRN
Status: DISCONTINUED | OUTPATIENT
Start: 2019-12-17 | End: 2019-12-17 | Stop reason: SDUPTHER

## 2019-12-17 RX ORDER — OXYCODONE HYDROCHLORIDE 5 MG/1
5 TABLET ORAL EVERY 4 HOURS PRN
Status: DISCONTINUED | OUTPATIENT
Start: 2019-12-17 | End: 2019-12-18 | Stop reason: HOSPADM

## 2019-12-17 RX ORDER — SODIUM CHLORIDE 450 MG/100ML
INJECTION, SOLUTION INTRAVENOUS CONTINUOUS
Status: DISCONTINUED | OUTPATIENT
Start: 2019-12-17 | End: 2019-12-18 | Stop reason: HOSPADM

## 2019-12-17 RX ORDER — SODIUM CHLORIDE 0.9 % (FLUSH) 0.9 %
10 SYRINGE (ML) INJECTION PRN
Status: DISCONTINUED | OUTPATIENT
Start: 2019-12-17 | End: 2019-12-17 | Stop reason: HOSPADM

## 2019-12-17 RX ORDER — ATORVASTATIN CALCIUM 40 MG/1
40 TABLET, FILM COATED ORAL DAILY
Status: DISCONTINUED | OUTPATIENT
Start: 2019-12-17 | End: 2019-12-18 | Stop reason: HOSPADM

## 2019-12-17 RX ORDER — ESMOLOL HYDROCHLORIDE 10 MG/ML
INJECTION INTRAVENOUS PRN
Status: DISCONTINUED | OUTPATIENT
Start: 2019-12-17 | End: 2019-12-17 | Stop reason: SDUPTHER

## 2019-12-17 RX ORDER — OXYCODONE HYDROCHLORIDE AND ACETAMINOPHEN 5; 325 MG/1; MG/1
2 TABLET ORAL PRN
Status: COMPLETED | OUTPATIENT
Start: 2019-12-17 | End: 2019-12-17

## 2019-12-17 RX ORDER — CELECOXIB 200 MG/1
200 CAPSULE ORAL EVERY 24 HOURS
Status: DISCONTINUED | OUTPATIENT
Start: 2019-12-18 | End: 2019-12-18 | Stop reason: HOSPADM

## 2019-12-17 RX ORDER — NICOTINE POLACRILEX 4 MG
15 LOZENGE BUCCAL PRN
Status: DISCONTINUED | OUTPATIENT
Start: 2019-12-17 | End: 2019-12-18 | Stop reason: HOSPADM

## 2019-12-17 RX ORDER — ASPIRIN 81 MG/1
81 TABLET ORAL 2 TIMES DAILY
Qty: 28 TABLET | Refills: 0 | Status: ON HOLD | OUTPATIENT
Start: 2019-12-17 | End: 2020-02-19 | Stop reason: HOSPADM

## 2019-12-17 RX ORDER — TRAZODONE HYDROCHLORIDE 100 MG/1
200 TABLET ORAL NIGHTLY
Status: DISCONTINUED | OUTPATIENT
Start: 2019-12-17 | End: 2019-12-18 | Stop reason: HOSPADM

## 2019-12-17 RX ORDER — ZOLPIDEM TARTRATE 5 MG/1
10 TABLET ORAL NIGHTLY PRN
Status: DISCONTINUED | OUTPATIENT
Start: 2019-12-17 | End: 2019-12-18 | Stop reason: HOSPADM

## 2019-12-17 RX ORDER — PROPOFOL 10 MG/ML
INJECTION, EMULSION INTRAVENOUS PRN
Status: DISCONTINUED | OUTPATIENT
Start: 2019-12-17 | End: 2019-12-17 | Stop reason: SDUPTHER

## 2019-12-17 RX ORDER — OXYCODONE HYDROCHLORIDE 5 MG/1
5-10 TABLET ORAL EVERY 4 HOURS PRN
Qty: 40 TABLET | Refills: 0 | Status: SHIPPED | OUTPATIENT
Start: 2019-12-17 | End: 2019-12-23 | Stop reason: SDUPTHER

## 2019-12-17 RX ORDER — OXYCODONE HYDROCHLORIDE AND ACETAMINOPHEN 5; 325 MG/1; MG/1
1 TABLET ORAL PRN
Status: COMPLETED | OUTPATIENT
Start: 2019-12-17 | End: 2019-12-17

## 2019-12-17 RX ORDER — DEXTROSE MONOHYDRATE 25 G/50ML
12.5 INJECTION, SOLUTION INTRAVENOUS PRN
Status: DISCONTINUED | OUTPATIENT
Start: 2019-12-17 | End: 2019-12-18 | Stop reason: HOSPADM

## 2019-12-17 RX ORDER — LIDOCAINE HYDROCHLORIDE 20 MG/ML
INJECTION, SOLUTION EPIDURAL; INFILTRATION; INTRACAUDAL; PERINEURAL PRN
Status: DISCONTINUED | OUTPATIENT
Start: 2019-12-17 | End: 2019-12-17 | Stop reason: SDUPTHER

## 2019-12-17 RX ORDER — ONDANSETRON 2 MG/ML
4 INJECTION INTRAMUSCULAR; INTRAVENOUS EVERY 6 HOURS PRN
Status: DISCONTINUED | OUTPATIENT
Start: 2019-12-17 | End: 2019-12-18 | Stop reason: HOSPADM

## 2019-12-17 RX ADMIN — FENTANYL CITRATE 50 MCG: 50 INJECTION INTRAMUSCULAR; INTRAVENOUS at 07:59

## 2019-12-17 RX ADMIN — Medication 2 G: at 23:01

## 2019-12-17 RX ADMIN — HYDROMORPHONE HYDROCHLORIDE 0.5 MG: 1 INJECTION, SOLUTION INTRAMUSCULAR; INTRAVENOUS; SUBCUTANEOUS at 08:53

## 2019-12-17 RX ADMIN — FLUOXETINE 20 MG: 20 CAPSULE ORAL at 14:24

## 2019-12-17 RX ADMIN — HYDROMORPHONE HYDROCHLORIDE 0.5 MG: 1 INJECTION, SOLUTION INTRAMUSCULAR; INTRAVENOUS; SUBCUTANEOUS at 09:16

## 2019-12-17 RX ADMIN — FENTANYL CITRATE 50 MCG: 50 INJECTION INTRAMUSCULAR; INTRAVENOUS at 07:50

## 2019-12-17 RX ADMIN — HYDROMORPHONE HYDROCHLORIDE 0.5 MG: 1 INJECTION, SOLUTION INTRAMUSCULAR; INTRAVENOUS; SUBCUTANEOUS at 09:01

## 2019-12-17 RX ADMIN — ATORVASTATIN CALCIUM 40 MG: 40 TABLET, FILM COATED ORAL at 14:24

## 2019-12-17 RX ADMIN — DEXAMETHASONE SODIUM PHOSPHATE 8 MG: 4 INJECTION, SOLUTION INTRAMUSCULAR; INTRAVENOUS at 07:43

## 2019-12-17 RX ADMIN — SENNOSIDES AND DOCUSATE SODIUM 1 TABLET: 8.6; 5 TABLET ORAL at 14:24

## 2019-12-17 RX ADMIN — MIDAZOLAM 2 MG: 1 INJECTION INTRAMUSCULAR; INTRAVENOUS at 07:27

## 2019-12-17 RX ADMIN — DOCUSATE SODIUM 100 MG: 100 CAPSULE, LIQUID FILLED ORAL at 14:24

## 2019-12-17 RX ADMIN — OXYCODONE HYDROCHLORIDE 10 MG: 10 TABLET ORAL at 17:53

## 2019-12-17 RX ADMIN — OXYCODONE HYDROCHLORIDE 10 MG: 10 TABLET ORAL at 13:51

## 2019-12-17 RX ADMIN — ENOXAPARIN SODIUM 30 MG: 30 INJECTION SUBCUTANEOUS at 22:02

## 2019-12-17 RX ADMIN — LIDOCAINE HYDROCHLORIDE 50 MG: 20 INJECTION, SOLUTION EPIDURAL; INFILTRATION; INTRACAUDAL; PERINEURAL at 07:31

## 2019-12-17 RX ADMIN — SODIUM CHLORIDE: 4.5 INJECTION, SOLUTION INTRAVENOUS at 14:24

## 2019-12-17 RX ADMIN — HYDROMORPHONE HYDROCHLORIDE 0.3 MG: 1 INJECTION, SOLUTION INTRAMUSCULAR; INTRAVENOUS; SUBCUTANEOUS at 11:03

## 2019-12-17 RX ADMIN — OXYCODONE HYDROCHLORIDE 10 MG: 5 TABLET ORAL at 06:42

## 2019-12-17 RX ADMIN — SUGAMMADEX 200 MG: 100 INJECTION, SOLUTION INTRAVENOUS at 08:11

## 2019-12-17 RX ADMIN — HYDROMORPHONE HYDROCHLORIDE 0.5 MG: 1 INJECTION, SOLUTION INTRAMUSCULAR; INTRAVENOUS; SUBCUTANEOUS at 08:46

## 2019-12-17 RX ADMIN — HYDROMORPHONE HYDROCHLORIDE 0.5 MG: 1 INJECTION, SOLUTION INTRAMUSCULAR; INTRAVENOUS; SUBCUTANEOUS at 09:29

## 2019-12-17 RX ADMIN — ESMOLOL HYDROCHLORIDE 50 MG: 100 INJECTION, SOLUTION INTRAVENOUS at 08:02

## 2019-12-17 RX ADMIN — SODIUM CHLORIDE, PRESERVATIVE FREE 10 ML: 5 INJECTION INTRAVENOUS at 21:27

## 2019-12-17 RX ADMIN — ONDANSETRON 4 MG: 2 INJECTION INTRAMUSCULAR; INTRAVENOUS at 08:11

## 2019-12-17 RX ADMIN — FENTANYL CITRATE 50 MCG: 50 INJECTION INTRAMUSCULAR; INTRAVENOUS at 07:40

## 2019-12-17 RX ADMIN — OXYCODONE HYDROCHLORIDE 10 MG: 10 TABLET ORAL at 22:01

## 2019-12-17 RX ADMIN — OXYCODONE HYDROCHLORIDE AND ACETAMINOPHEN 2 TABLET: 5; 325 TABLET ORAL at 12:02

## 2019-12-17 RX ADMIN — ACETAMINOPHEN 650 MG: 325 TABLET ORAL at 13:51

## 2019-12-17 RX ADMIN — Medication 3 G: at 07:29

## 2019-12-17 RX ADMIN — SODIUM CHLORIDE: 9 INJECTION, SOLUTION INTRAVENOUS at 06:42

## 2019-12-17 RX ADMIN — DOCUSATE SODIUM 100 MG: 100 CAPSULE, LIQUID FILLED ORAL at 21:33

## 2019-12-17 RX ADMIN — ZOLPIDEM TARTRATE 10 MG: 5 TABLET ORAL at 23:01

## 2019-12-17 RX ADMIN — PANTOPRAZOLE SODIUM 40 MG: 40 TABLET, DELAYED RELEASE ORAL at 14:24

## 2019-12-17 RX ADMIN — ACETAMINOPHEN 650 MG: 325 TABLET ORAL at 21:26

## 2019-12-17 RX ADMIN — Medication 2 G: at 15:44

## 2019-12-17 RX ADMIN — TRAZODONE HYDROCHLORIDE 200 MG: 100 TABLET ORAL at 21:26

## 2019-12-17 RX ADMIN — SODIUM CHLORIDE: 9 INJECTION, SOLUTION INTRAVENOUS at 07:25

## 2019-12-17 RX ADMIN — FENTANYL CITRATE 50 MCG: 50 INJECTION INTRAMUSCULAR; INTRAVENOUS at 07:27

## 2019-12-17 RX ADMIN — PROPOFOL 200 MG: 10 INJECTION, EMULSION INTRAVENOUS at 07:31

## 2019-12-17 RX ADMIN — ROCURONIUM BROMIDE 40 MG: 10 INJECTION INTRAVENOUS at 07:35

## 2019-12-17 RX ADMIN — Medication 100 MG: at 07:33

## 2019-12-17 RX ADMIN — SENNOSIDES AND DOCUSATE SODIUM 1 TABLET: 8.6; 5 TABLET ORAL at 21:26

## 2019-12-17 RX ADMIN — HYDROMORPHONE HYDROCHLORIDE 0.5 MG: 1 INJECTION, SOLUTION INTRAMUSCULAR; INTRAVENOUS; SUBCUTANEOUS at 08:37

## 2019-12-17 ASSESSMENT — PAIN DESCRIPTION - ORIENTATION
ORIENTATION: RIGHT

## 2019-12-17 ASSESSMENT — PULMONARY FUNCTION TESTS
PIF_VALUE: 13
PIF_VALUE: 16
PIF_VALUE: 13
PIF_VALUE: 11
PIF_VALUE: 16
PIF_VALUE: 15
PIF_VALUE: 16
PIF_VALUE: 16
PIF_VALUE: 11
PIF_VALUE: 14
PIF_VALUE: 16
PIF_VALUE: 13
PIF_VALUE: 14
PIF_VALUE: 13
PIF_VALUE: 11
PIF_VALUE: 13
PIF_VALUE: 12
PIF_VALUE: 15
PIF_VALUE: 16
PIF_VALUE: 5
PIF_VALUE: 0
PIF_VALUE: 12
PIF_VALUE: 13
PIF_VALUE: 16
PIF_VALUE: 13
PIF_VALUE: 13
PIF_VALUE: 15
PIF_VALUE: 12
PIF_VALUE: 16
PIF_VALUE: 0
PIF_VALUE: 12
PIF_VALUE: 0
PIF_VALUE: 15
PIF_VALUE: 16
PIF_VALUE: 16
PIF_VALUE: 14
PIF_VALUE: 16
PIF_VALUE: 16
PIF_VALUE: 13
PIF_VALUE: 4
PIF_VALUE: 16
PIF_VALUE: 16
PIF_VALUE: 13
PIF_VALUE: 16
PIF_VALUE: 23
PIF_VALUE: 15
PIF_VALUE: 15
PIF_VALUE: 13
PIF_VALUE: 15
PIF_VALUE: 12
PIF_VALUE: 16
PIF_VALUE: 13
PIF_VALUE: 13
PIF_VALUE: 15
PIF_VALUE: 12

## 2019-12-17 ASSESSMENT — PAIN DESCRIPTION - FREQUENCY
FREQUENCY: CONTINUOUS
FREQUENCY: INTERMITTENT
FREQUENCY: CONTINUOUS

## 2019-12-17 ASSESSMENT — PAIN DESCRIPTION - PAIN TYPE
TYPE: SURGICAL PAIN

## 2019-12-17 ASSESSMENT — PAIN SCALES - GENERAL
PAINLEVEL_OUTOF10: 6
PAINLEVEL_OUTOF10: 8
PAINLEVEL_OUTOF10: 6
PAINLEVEL_OUTOF10: 8
PAINLEVEL_OUTOF10: 7
PAINLEVEL_OUTOF10: 7
PAINLEVEL_OUTOF10: 8
PAINLEVEL_OUTOF10: 6
PAINLEVEL_OUTOF10: 8
PAINLEVEL_OUTOF10: 8
PAINLEVEL_OUTOF10: 7
PAINLEVEL_OUTOF10: 7
PAINLEVEL_OUTOF10: 6
PAINLEVEL_OUTOF10: 7
PAINLEVEL_OUTOF10: 7
PAINLEVEL_OUTOF10: 6
PAINLEVEL_OUTOF10: 8
PAINLEVEL_OUTOF10: 9
PAINLEVEL_OUTOF10: 7
PAINLEVEL_OUTOF10: 6
PAINLEVEL_OUTOF10: 7
PAINLEVEL_OUTOF10: 8
PAINLEVEL_OUTOF10: 7
PAINLEVEL_OUTOF10: 7

## 2019-12-17 ASSESSMENT — PAIN DESCRIPTION - PROGRESSION
CLINICAL_PROGRESSION: GRADUALLY WORSENING
CLINICAL_PROGRESSION: GRADUALLY WORSENING
CLINICAL_PROGRESSION: GRADUALLY IMPROVING
CLINICAL_PROGRESSION: NOT CHANGED
CLINICAL_PROGRESSION: NOT CHANGED
CLINICAL_PROGRESSION: GRADUALLY WORSENING
CLINICAL_PROGRESSION: GRADUALLY IMPROVING
CLINICAL_PROGRESSION: GRADUALLY WORSENING
CLINICAL_PROGRESSION: GRADUALLY IMPROVING
CLINICAL_PROGRESSION: NOT CHANGED
CLINICAL_PROGRESSION: GRADUALLY WORSENING
CLINICAL_PROGRESSION: GRADUALLY WORSENING
CLINICAL_PROGRESSION: NOT CHANGED
CLINICAL_PROGRESSION: GRADUALLY IMPROVING
CLINICAL_PROGRESSION: NOT CHANGED
CLINICAL_PROGRESSION: GRADUALLY IMPROVING

## 2019-12-17 ASSESSMENT — PAIN DESCRIPTION - LOCATION
LOCATION: LEG
LOCATION: KNEE
LOCATION: LEG
LOCATION: KNEE
LOCATION: KNEE
LOCATION: LEG
LOCATION: KNEE
LOCATION: KNEE
LOCATION: LEG
LOCATION: KNEE
LOCATION: KNEE
LOCATION: LEG
LOCATION: LEG
LOCATION: KNEE
LOCATION: LEG

## 2019-12-17 ASSESSMENT — PAIN DESCRIPTION - ONSET
ONSET: ON-GOING

## 2019-12-17 ASSESSMENT — PAIN DESCRIPTION - DESCRIPTORS
DESCRIPTORS: SHARP
DESCRIPTORS: SHARP;ACHING
DESCRIPTORS: SHARP
DESCRIPTORS: SHARP
DESCRIPTORS: ACHING
DESCRIPTORS: SHARP
DESCRIPTORS: ACHING
DESCRIPTORS: SHARP

## 2019-12-17 ASSESSMENT — PAIN - FUNCTIONAL ASSESSMENT
PAIN_FUNCTIONAL_ASSESSMENT: PREVENTS OR INTERFERES SOME ACTIVE ACTIVITIES AND ADLS
PAIN_FUNCTIONAL_ASSESSMENT: 0-10
PAIN_FUNCTIONAL_ASSESSMENT: PREVENTS OR INTERFERES SOME ACTIVE ACTIVITIES AND ADLS
PAIN_FUNCTIONAL_ASSESSMENT: PREVENTS OR INTERFERES SOME ACTIVE ACTIVITIES AND ADLS

## 2019-12-18 VITALS
HEIGHT: 72 IN | WEIGHT: 264.55 LBS | BODY MASS INDEX: 35.83 KG/M2 | TEMPERATURE: 98.2 F | SYSTOLIC BLOOD PRESSURE: 125 MMHG | RESPIRATION RATE: 18 BRPM | OXYGEN SATURATION: 96 % | HEART RATE: 71 BPM | DIASTOLIC BLOOD PRESSURE: 75 MMHG

## 2019-12-18 LAB
ESTIMATED AVERAGE GLUCOSE: 102.5 MG/DL
GLUCOSE BLD-MCNC: 113 MG/DL (ref 70–99)
HBA1C MFR BLD: 5.2 %
HCT VFR BLD CALC: 39.6 % (ref 40.5–52.5)
HEMOGLOBIN: 13.1 G/DL (ref 13.5–17.5)
PERFORMED ON: ABNORMAL

## 2019-12-18 PROCEDURE — 97110 THERAPEUTIC EXERCISES: CPT

## 2019-12-18 PROCEDURE — 36415 COLL VENOUS BLD VENIPUNCTURE: CPT

## 2019-12-18 PROCEDURE — 97530 THERAPEUTIC ACTIVITIES: CPT

## 2019-12-18 PROCEDURE — G0008 ADMIN INFLUENZA VIRUS VAC: HCPCS

## 2019-12-18 PROCEDURE — 85018 HEMOGLOBIN: CPT

## 2019-12-18 PROCEDURE — 90686 IIV4 VACC NO PRSV 0.5 ML IM: CPT

## 2019-12-18 PROCEDURE — 97535 SELF CARE MNGMENT TRAINING: CPT

## 2019-12-18 PROCEDURE — 83036 HEMOGLOBIN GLYCOSYLATED A1C: CPT

## 2019-12-18 PROCEDURE — 6360000002 HC RX W HCPCS: Performed by: ORTHOPAEDIC SURGERY

## 2019-12-18 PROCEDURE — 97116 GAIT TRAINING THERAPY: CPT

## 2019-12-18 PROCEDURE — 6370000000 HC RX 637 (ALT 250 FOR IP): Performed by: ORTHOPAEDIC SURGERY

## 2019-12-18 PROCEDURE — 6370000000 HC RX 637 (ALT 250 FOR IP): Performed by: NURSE PRACTITIONER

## 2019-12-18 PROCEDURE — 85014 HEMATOCRIT: CPT

## 2019-12-18 PROCEDURE — 6360000002 HC RX W HCPCS

## 2019-12-18 PROCEDURE — 2580000003 HC RX 258: Performed by: ORTHOPAEDIC SURGERY

## 2019-12-18 RX ORDER — DICYCLOMINE HCL 20 MG
40 TABLET ORAL 2 TIMES DAILY PRN
Refills: 0 | COMMUNITY
Start: 2019-12-01 | End: 2020-05-16

## 2019-12-18 RX ORDER — ZOLPIDEM TARTRATE 10 MG/1
10 TABLET ORAL NIGHTLY PRN
Refills: 5 | COMMUNITY
Start: 2019-12-06 | End: 2020-03-09 | Stop reason: SDUPTHER

## 2019-12-18 RX ADMIN — SODIUM CHLORIDE, PRESERVATIVE FREE 10 ML: 5 INJECTION INTRAVENOUS at 08:38

## 2019-12-18 RX ADMIN — DOCUSATE SODIUM 100 MG: 100 CAPSULE, LIQUID FILLED ORAL at 08:36

## 2019-12-18 RX ADMIN — SODIUM CHLORIDE: 4.5 INJECTION, SOLUTION INTRAVENOUS at 03:15

## 2019-12-18 RX ADMIN — OXYCODONE HYDROCHLORIDE 10 MG: 10 TABLET ORAL at 03:14

## 2019-12-18 RX ADMIN — PANTOPRAZOLE SODIUM 40 MG: 40 TABLET, DELAYED RELEASE ORAL at 08:37

## 2019-12-18 RX ADMIN — ENOXAPARIN SODIUM 30 MG: 30 INJECTION SUBCUTANEOUS at 08:37

## 2019-12-18 RX ADMIN — HYDROMORPHONE HYDROCHLORIDE 1 MG: 1 INJECTION, SOLUTION INTRAMUSCULAR; INTRAVENOUS; SUBCUTANEOUS at 02:10

## 2019-12-18 RX ADMIN — CELECOXIB 200 MG: 200 CAPSULE ORAL at 06:30

## 2019-12-18 RX ADMIN — SENNOSIDES AND DOCUSATE SODIUM 1 TABLET: 8.6; 5 TABLET ORAL at 08:37

## 2019-12-18 RX ADMIN — ACETAMINOPHEN 650 MG: 325 TABLET ORAL at 01:08

## 2019-12-18 RX ADMIN — ATORVASTATIN CALCIUM 40 MG: 40 TABLET, FILM COATED ORAL at 08:36

## 2019-12-18 RX ADMIN — FLUOXETINE 20 MG: 20 CAPSULE ORAL at 08:37

## 2019-12-18 RX ADMIN — OXYCODONE HYDROCHLORIDE 10 MG: 10 TABLET ORAL at 07:36

## 2019-12-18 RX ADMIN — INFLUENZA A VIRUS A/BRISBANE/02/2018 IVR-190 (H1N1) ANTIGEN (PROPIOLACTONE INACTIVATED), INFLUENZA A VIRUS A/KANSAS/14/2017 X-327 (H3N2) ANTIGEN (PROPIOLACTONE INACTIVATED), INFLUENZA B VIRUS B/MARYLAND/15/2016 ANTIGEN (PROPIOLACTONE INACTIVATED), INFLUENZA B VIRUS B/PHUKET/3073/2013 BVR-1B ANTIGEN (PROPIOLACTONE INACTIVATED) 0.5 ML: 15; 15; 15; 15 INJECTION, SUSPENSION INTRAMUSCULAR at 08:37

## 2019-12-18 RX ADMIN — ACETAMINOPHEN 650 MG: 325 TABLET ORAL at 06:30

## 2019-12-18 RX ADMIN — OXYCODONE HYDROCHLORIDE 10 MG: 10 TABLET ORAL at 11:41

## 2019-12-18 ASSESSMENT — PAIN SCALES - GENERAL
PAINLEVEL_OUTOF10: 7
PAINLEVEL_OUTOF10: 7
PAINLEVEL_OUTOF10: 8
PAINLEVEL_OUTOF10: 9
PAINLEVEL_OUTOF10: 5
PAINLEVEL_OUTOF10: 7
PAINLEVEL_OUTOF10: 6
PAINLEVEL_OUTOF10: 7
PAINLEVEL_OUTOF10: 8
PAINLEVEL_OUTOF10: 7
PAINLEVEL_OUTOF10: 9

## 2019-12-18 ASSESSMENT — PAIN DESCRIPTION - FREQUENCY
FREQUENCY: CONTINUOUS

## 2019-12-18 ASSESSMENT — PAIN DESCRIPTION - ORIENTATION
ORIENTATION: RIGHT

## 2019-12-18 ASSESSMENT — PAIN DESCRIPTION - LOCATION
LOCATION: KNEE

## 2019-12-18 ASSESSMENT — PAIN DESCRIPTION - ONSET
ONSET: ON-GOING
ONSET: GRADUAL
ONSET: ON-GOING

## 2019-12-18 ASSESSMENT — PAIN DESCRIPTION - PROGRESSION
CLINICAL_PROGRESSION: GRADUALLY IMPROVING
CLINICAL_PROGRESSION: GRADUALLY WORSENING
CLINICAL_PROGRESSION: GRADUALLY IMPROVING
CLINICAL_PROGRESSION: GRADUALLY WORSENING
CLINICAL_PROGRESSION: GRADUALLY WORSENING
CLINICAL_PROGRESSION: GRADUALLY IMPROVING

## 2019-12-18 ASSESSMENT — PAIN DESCRIPTION - PAIN TYPE
TYPE: SURGICAL PAIN

## 2019-12-18 ASSESSMENT — PAIN - FUNCTIONAL ASSESSMENT
PAIN_FUNCTIONAL_ASSESSMENT: PREVENTS OR INTERFERES SOME ACTIVE ACTIVITIES AND ADLS

## 2019-12-18 ASSESSMENT — PAIN DESCRIPTION - DESCRIPTORS
DESCRIPTORS: ACHING

## 2019-12-19 ENCOUNTER — CARE COORDINATION (OUTPATIENT)
Dept: CASE MANAGEMENT | Age: 49
End: 2019-12-19

## 2019-12-20 ENCOUNTER — CARE COORDINATION (OUTPATIENT)
Dept: CASE MANAGEMENT | Age: 49
End: 2019-12-20

## 2019-12-20 DIAGNOSIS — T84.012A FAILED TOTAL KNEE, RIGHT, INITIAL ENCOUNTER (HCC): Primary | ICD-10-CM

## 2019-12-23 ENCOUNTER — TELEPHONE (OUTPATIENT)
Dept: ORTHOPEDIC SURGERY | Age: 49
End: 2019-12-23

## 2019-12-23 DIAGNOSIS — T84.012A FAILED TOTAL KNEE, RIGHT, INITIAL ENCOUNTER (HCC): ICD-10-CM

## 2019-12-23 RX ORDER — OXYCODONE HYDROCHLORIDE 5 MG/1
5-10 TABLET ORAL EVERY 4 HOURS PRN
Qty: 40 TABLET | Refills: 0 | Status: SHIPPED | OUTPATIENT
Start: 2019-12-23 | End: 2019-12-26 | Stop reason: SDUPTHER

## 2019-12-26 DIAGNOSIS — T84.012A FAILED TOTAL KNEE, RIGHT, INITIAL ENCOUNTER (HCC): ICD-10-CM

## 2019-12-28 RX ORDER — OXYCODONE HYDROCHLORIDE 5 MG/1
5-10 TABLET ORAL EVERY 4 HOURS PRN
Qty: 40 TABLET | Refills: 0 | Status: SHIPPED | OUTPATIENT
Start: 2019-12-28 | End: 2020-01-04

## 2019-12-31 ENCOUNTER — CARE COORDINATION (OUTPATIENT)
Dept: CASE MANAGEMENT | Age: 49
End: 2019-12-31

## 2020-01-02 ENCOUNTER — OFFICE VISIT (OUTPATIENT)
Dept: ORTHOPEDIC SURGERY | Age: 50
End: 2020-01-02

## 2020-01-02 VITALS
SYSTOLIC BLOOD PRESSURE: 112 MMHG | WEIGHT: 264 LBS | DIASTOLIC BLOOD PRESSURE: 65 MMHG | BODY MASS INDEX: 35.76 KG/M2 | HEART RATE: 61 BPM | HEIGHT: 72 IN | TEMPERATURE: 98.6 F

## 2020-01-02 PROCEDURE — APPSS15 APP SPLIT SHARED TIME 0-15 MINUTES: Performed by: PHYSICIAN ASSISTANT

## 2020-01-02 PROCEDURE — 99024 POSTOP FOLLOW-UP VISIT: CPT | Performed by: PHYSICIAN ASSISTANT

## 2020-01-03 NOTE — PROGRESS NOTES
ENDOSCOPY, COLON, DIAGNOSTIC      EYE SURGERY      GASTRIC BYPASS SURGERY  2009    Has lost about 200 pounds.  HERNIA REPAIR  3-    ventral    JOINT REPLACEMENT      KIDNEY REMOVAL Left 2018    KNEE ARTHROSCOPY Right 2013    Dr.Robert Sanders     KNEE ARTHROSCOPY Right 12    RIGHT KNEE ARTHROSCOPY WITH CHONDROPLASTY    KNEE SURGERY  2012    right, arthroscopy    LAPAROTOMY      LASIK  2005    OTHER SURGICAL HISTORY Left 2016    CT biopsy ablasion left kidney    OTHER SURGICAL HISTORY  2016    small intestine 12 inch removed, 2 hernia surgeries, another surgery to drain infection from incision.  REVISION TOTAL KNEE ARTHROPLASTY Right 2019    RIGHT REVISION TIBIA TOTAL KNEE REPLACEMENT performed by Deshaun Langley MD at [address] 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 Tobacco Use    Smoking status: Former Smoker     Packs/day: 0.50     Years: 25.00     Pack years: 12.50     Types: Cigarettes     Last attempt to quit: 2017     Years since quittin.4    Smokeless tobacco: Never Used   Substance and Sexual Activity    Alcohol use: No     Alcohol/week: 0.0 standard drinks     Comment: last drink     Drug use: No     Comment: history of    Sexual activity: Yes     Partners: Female     Comment: wife Maria Eugenia Mart       Current Outpatient Medications   Medication Sig Dispense Refill    oxyCODONE (ROXICODONE) 5 MG immediate release tablet Take 1-2 tablets by mouth every 4 hours as needed for Pain for up to 7 days. 40 tablet 0    zolpidem (AMBIEN) 10 MG tablet Take 10 mg by mouth nightly as needed.   5    dicyclomine (BENTYL) 20 MG tablet Take 40 mg by mouth 2 times daily as needed  0    atorvastatin (LIPITOR) 40 MG tablet TAKE Hose stocking for at least 6 weeks post op. DVT Prophylaxis finished his aspirin. .   Continue with Roxicodone for moderate to severe postoperative pain. May remove pernio tape dressing at 2-1/2-3 weeks postop if not already removed. Follow Up: 3-4 weeks. Call or return to clinic prn if these symptoms worsen or fail to improve as anticipated.

## 2020-01-06 LAB
CULTURE, JOINT AEROBIC: NORMAL
CULTURE, JOINT ANAEROBIC: NORMAL
GRAM STAIN RESULT: NORMAL

## 2020-01-07 ENCOUNTER — HOSPITAL ENCOUNTER (OUTPATIENT)
Dept: PHYSICAL THERAPY | Age: 50
Setting detail: THERAPIES SERIES
Discharge: HOME | End: 2020-01-07
Payer: COMMERCIAL

## 2020-01-07 PROCEDURE — 97530 THERAPEUTIC ACTIVITIES: CPT | Performed by: CHIROPRACTOR

## 2020-01-07 PROCEDURE — 97161 PT EVAL LOW COMPLEX 20 MIN: CPT | Performed by: CHIROPRACTOR

## 2020-01-07 NOTE — FLOWSHEET NOTE
Poor    Goals:    Short term goals  Time Frame for Short term goals: 3 weeks  Short term goal 1: Be independent with home exer  Short term goal 2: Decrease pain 25-50%  Short term goal 3: Improve ROM  >10 degrees      Long term goals  Time Frame for Long term goals : 6 weeks  Long term goal 1: Decrease pain pain 50-75%  Long term goal 2: Be able to amb on stairs with recip gait al of the time  Long term goal 3: Strength   WNL to resume usual activities  Long term goal 4:  Increase  ROM > 15 degrees     Patient Requires Follow-up: [x] Yes  [] No    Plan:   [x] Continue per plan of care [] Alter current plan (see comments)  [] Plan of care initiated [] Hold pending MD visit [] Discharge    Plan for Next Session:  Progressive stretching/ strengthening    Electronically signed by:  Cash Pedersen [id number]

## 2020-01-07 NOTE — PLAN OF CARE
Outpatient Physical Therapy  [x] [facility]    Phone: [phone]   Fax: [phone]   [] [facility]  Phone: [phone]              Fax: [phone]  [] [facility]   Phone: [phone]   Fax: [phone]     To: Referring Practitioner: JEMAL Cage      Patient: David Magallanes   : 1970   MRN: [id number]  Evaluation Date: 2020      Diagnosis Information:  · Diagnosis: R TKA revision (polyehylene exchange)   ·       Physical Therapy Certification/Re-Certification Form  Dear JEMAL Cage  The following patient has been evaluated for physical therapy services and for therapy to continue, Medicare requires monthly physician review of the treatment plan. Please review the attached evaluation and/or summary of the patient's plan of care, and verify that you agree therapy should continue by signing the attached document and sending it back to our office. Plan of Care/Treatment to date:  [x] Therapeutic Exercise    [x] Modalities:  [x] Therapeutic Activity     [] Ultrasound  [] Electrical Stimulation  [] Gait Training      [] Cervical Traction [] Lumbar Traction  [] Neuromuscular Re-education    [] Cold/hotpack [] Iontophoresis   [x] Instruction in HEP     Other:  [] Manual Therapy      []             [] Aquatic Therapy      []           ? Frequency/Duration:  # Days per week: [] 1 day # Weeks: [] 1 week [] 5 weeks     [x] 2 days? [] 2 weeks [x] 6 weeks     [x] 3 days   [] 3 weeks [] 7 weeks     [] 4 days   [x] 4 weeks [] 8 weeks    Rehab Potential: [] Excellent [x] Good [] Fair  [] Poor       Electronically signed by:  Siva JeromeGood [facility]#[id number]      If you have any questions or concerns, please don't hesitate to call.   Thank you for your referral.      Physician Signature:________________________________Date:__________________  By signing above, therapists plan is approved by physician

## 2020-01-07 NOTE — PROGRESS NOTES
Physical Therapy  Initial Assessment  Date: 2020  Patient Name: Aidan Nix  MRN: [id number]  : 1970          Restrictions  Restrictions/Precautions  Restrictions/Precautions: Fall Risk(Low fall risk)    Subjective   General  Chart Reviewed: Yes  Patient assessed for rehabilitation services?: Yes  Additional Pertinent Hx: Arthritis, CA, Initial R TKA   Family / Caregiver Present: No  Referring Practitioner: JEMAL Laurent  Referral Date : 20  Diagnosis: R TKA revision (polyehylene exchange)  PT Visit Information  Onset Date: (Surgery Dec 17, 2019)  PT Insurance Information: BCBS  Total # of Visits Approved: 12  Total # of Visits to Date: 1  Subjective  Subjective: C/o knee pain / edema, decreased ROM  Pain Screening  Patient Currently in Pain: (1/10 at rest, up to 3/10 with activity)         Objective          PROM RLE (degrees)  RLE PROM: (Knee )  PROM LLE (degrees)  LLE PROM: (Knee 0-135)    Strength RLE  Strength RLE: (Knee 4+/5)  Strength LLE  Strength LLE: WNL     Additional Measures  Flexibility: Good HS flexibility  Sensation  Overall Sensation Status: WFL             Ambulation  Ambulation?: (Amb without any assistive device)  Stairs/Curb  Stairs?: (States he goes up /down stairs with recip gait  most of the time, but as he becomes fatigued by the end of the day he used non-recip gait)                            Assessment   Conditions Requiring Skilled Therapeutic Intervention  Body structures, Functions, Activity limitations: Increased pain;Decreased strength;Decreased ROM  Assessment: Prior level of function: Able to complete usual ADLs without difficulty  Prognosis: Good  Decision Making: Low Complexity  REQUIRES PT FOLLOW UP: No  Activity Tolerance  Activity Tolerance: Patient Tolerated treatment well         Plan   Plan  Times per week: 2-3x/wk x 4-6 weeks  Current Treatment Recommendations: Strengthening, ROM, Modalities         OutComes Score  LEFS Total Score: 44 (01/07/20 7000)                                                          Goals  Short term goals  Time Frame for Short term goals: 3 weeks  Short term goal 1: Be independent with home exer  Short term goal 2: Decrease pain 25-50%  Short term goal 3: Improve ROM  >10 degrees  Long term goals  Time Frame for Long term goals : 6 weeks  Long term goal 1: Decrease pain pain 50-75%  Long term goal 2: Be able to amb on stairs with recip gait al of the time  Long term goal 3: Strength   WNL to resume usual activities  Long term goal 4: Increase  ROM > 15 degrees  Patient Goals   Patient goals :  \"Less Pain\"        Kamari Hartman #[id number]

## 2020-01-07 NOTE — COMMUNICATION BODY
Assessment:  Conditions Requiring Skilled Therapeutic Intervention  Body structures, Functions, Activity limitations: Increased pain;Decreased strength;Decreased ROM  Assessment: Prior level of function: Able to complete usual ADLs without difficulty  Prognosis: Good  Decision Making: Low Complexity  REQUIRES PT FOLLOW UP: No  Activity Tolerance  Activity Tolerance: Patient Tolerated treatment well      Plan:  Plan  Times per week: 2-3x/wk x 4-6 weeks  Current Treatment Recommendations: Strengthening, ROM, Modalities         OutComes Score  LEFS Total Score: 44 (01/07/20 7723)                                                          Goals  Short term goals  Time Frame for Short term goals: 3 weeks  Short term goal 1: Be independent with home exer  Short term goal 2: Decrease pain 25-50%  Short term goal 3: Improve ROM  >10 degrees  Long term goals  Time Frame for Long term goals : 6 weeks  Long term goal 1: Decrease pain pain 50-75%  Long term goal 2: Be able to amb on stairs with recip gait al of the time  Long term goal 3: Strength   WNL to resume usual activities  Long term goal 4: Increase  ROM > 15 degrees  Patient Goals   Patient goals :  \"Less Pain\"

## 2020-01-08 ENCOUNTER — HOSPITAL ENCOUNTER (OUTPATIENT)
Dept: PHYSICAL THERAPY | Age: 50
Setting detail: THERAPIES SERIES
Discharge: HOME | End: 2020-01-08
Payer: COMMERCIAL

## 2020-01-08 PROCEDURE — 97110 THERAPEUTIC EXERCISES: CPT

## 2020-01-08 NOTE — FLOWSHEET NOTE
Physical Therapy Daily Treatment Note  Date:  2020    Patient Name:  Cassandra Mcfarland    :  1970  MRN: [id number]    Restrictions/Precautions:   Fall Risk(Low fall risk)  Pertinent Medical History:     Arthritis, CA, Initial R TKA     Medical/Treatment Diagnosis Information:  · Diagnosis: R TKA revision (polyehylene exchange)       Insurance/Certification information:  [facility]  Physician Information:     JEMAL Muñiz  Plan of care signed (Y/N):  Inbox    Visit# / total visits:   Pain level: 1/10 at rest, up to 3/10 with activity     Functional Outcomes Measure:  Test: LEFS  Score: 44    Progress Note: []  Yes  [x]  No  Next due by: Visit #10      History of Injury:   R TKA , Revision (polyethylene exchange) 19    Subjective: Tolerated first Rx ok. No instability since revision. C/o of mild ache. No pain meds. Drove to PT     Objective:   Observation:    Test measurements:  R knee PROM:     Exercises:  Exercise/Equipment Resistance/Repetitions Other comments        Nu-step   #9 L-0 X 5 min         GSS/ HSS 30 sec x 2    Knee flex on stairs 10 sec x 4  R    Mini-squats 1x10    HR/TR 1x15    T-band TKE add                    FAQ 1# 2 x 10 R    HS curl Lime x 10 R    SLR X 10 R    SAQ 0# 2 x 10 R    Heel slides x10 R              Passive stretch flex                             Ext  X 10 R  X 10 R         CP X 12 min      Other Therapeutic Activities:      Home Exercise Program:  Voiced understanding of home exer    Manual Treatments:     Modalities:   CP    Progression Towards Functional goals:  [] Patient is progressing as expected towards functional goals listed. [] Progression is slowed due to complexities listed. [] Progression has been slowed due to co-morbidities. [] Plan just implemented, too soon to assess goals progression  [x] Other: drove to PT without problem. Charges:     Therapeutic Exercise:  [] (84106) Provided verbal/tactile cueing for activities to restore or maintain strength, flexibility, endurance, ROM for improvements with self-care, mobility, lifting and ambulation. Neuromuscular Re-Education  [] (47067) Provided verbal/tactile cueing for activities to restore or maintain balance, coordination, kinesthetic sense, posture, motor skill, proprioception for self-care, mobility, lifting, and ambulation. Therapeutic Activities:    [x] (56678) Provided verbal/tactile cueing to address functional limitations related to loss of mobility, strength, balance, and coordination. Gait Training:  [] (62182) Provided training and instruction to the patient for proper postural muscle recruitment and positioning with ambulation re-education     Home Exercise Program:    [] (23144) Reviewed/Progressed HEP activities related to strengthening, flexibility, endurance, ROM for functional self-care, mobility, lifting and ambulation   [] (82324) Reviewed/Progressed HEP activities related to improving balance, coordination, kinesthetic sense, posture, motor skill, proprioception for self-care, mobility, lifting, and ambulation      Manual Treatments:  MFR / STM / Oscillations-Mobs:  G-I, II, III, IV / Manipulation / MLD  [] (79111) Provided manual therapy to mobilize  soft tissue/joints/fluid for the purpose of modulating pain, promoting relaxation, increasing ROM, reducing/eliminating soft tissue swelling/inflammation/restriction, improving soft tissue extensibility and allowing for proper ROM for normal function with self- care, mobility, lifting and ambulation.         Timed Code Treatment Minutes: 25   Total Treatment Minutes: 37     [x] EVAL (LOW) 11069   [] EVAL (MOD) 96240   [] EVAL (HIGH) 21093   [] RE-EVAL   [] TE (00697) x     [] Aquatic (79196) x  [] NMR (36661)   x  [] Aquatic Group (10065) x  [] Manual (22202) x    [] Ultrasound (81183) x  [x] TA (80503) x 2  [] Mech Traction (07743)  [] Ionto (72534)           [] ES (un) (33196):   [] Vasopump (84896) [] Other:      Assessment  [x] Patient tolerated treatment well [] Patient limited by fatigue  [] Patient limited by pain  [] Patient limited by other medical complications  [] Other:     Prognosis: [x] Good [] Fair  [] Poor    Goals:                 Patient Requires Follow-up: [x] Yes  [] No    Plan:   [x] Continue per plan of care [] Alter current plan (see comments)  [] Plan of care initiated [] Hold pending MD visit [] Discharge    Plan for Next Session:  Progressive stretching/ strengthening    Electronically signed by:  Tom Tucker, PTA  [id number]

## 2020-01-10 ENCOUNTER — HOSPITAL ENCOUNTER (OUTPATIENT)
Dept: PHYSICAL THERAPY | Age: 50
Setting detail: THERAPIES SERIES
Discharge: HOME | End: 2020-01-10
Payer: COMMERCIAL

## 2020-01-10 PROCEDURE — 97110 THERAPEUTIC EXERCISES: CPT | Performed by: CHIROPRACTOR

## 2020-01-10 NOTE — FLOWSHEET NOTE
Physical Therapy Daily Treatment Note  Date:  1/10/2020    Patient Name:  Rip Dennis    :  1970  MRN: [id number]    Restrictions/Precautions:   Fall Risk(Low fall risk)  Pertinent Medical History:     Arthritis, CA, Initial R TKA     Medical/Treatment Diagnosis Information:  · Diagnosis: R TKA revision (polyehylene exchange)       Insurance/Certification information:  [facility]  Physician Information:     JEMAL Hernandez  Plan of care signed (Y/N):  Inbox    Visit# / total visits: 3/12  Pain level: 1-2/10     Functional Outcomes Measure:  Test: LEFS  Score: 44    Progress Note: []  Yes  [x]  No  Next due by: Visit #10      History of Injury:   R TKA , Revision (polyethylene exchange) 19    Subjective:   No new complaints voiced  Objective:   Observation:    Test measurements:  R knee PROM:     Exercises:  Exercise/Equipment Resistance/Repetitions Other comments        Nu-step   #9 L-0 X 5 min         GSS/ HSS 30 sec x 2    Knee flex on stairs 10 sec x 4  R    Mini-squats 1x15    HR/TR 1x15    T-band TKE add                    FAQ 11# - 1 x 10 R    HS Curl 11# - 1x10  R                   SAQ     SLR 1# - 1x10    R    SAQ 0# - 2x10    R    Heel slides               Passive stretch flex                             Ext  X 10 R  X 10 R         CP X 15 min      Other Therapeutic Activities:      Home Exercise Program:  Voiced understanding of home exer    Manual Treatments:     Modalities:   CP    Progression Towards Functional goals:  [] Patient is progressing as expected towards functional goals listed. [] Progression is slowed due to complexities listed. [] Progression has been slowed due to co-morbidities. [] Plan just implemented, too soon to assess goals progression  [x] Other: drove to PT without problem. Charges:     Therapeutic Exercise:  [x] (93599) Provided verbal/tactile cueing for activities to restore or maintain strength, flexibility, endurance, ROM for improvements with self-care, mobility, lifting and ambulation. Neuromuscular Re-Education  [] (64534) Provided verbal/tactile cueing for activities to restore or maintain balance, coordination, kinesthetic sense, posture, motor skill, proprioception for self-care, mobility, lifting, and ambulation. Therapeutic Activities:    [] (39287) Provided verbal/tactile cueing to address functional limitations related to loss of mobility, strength, balance, and coordination. Gait Training:  [] (76969) Provided training and instruction to the patient for proper postural muscle recruitment and positioning with ambulation re-education     Home Exercise Program:    [] (93608) Reviewed/Progressed HEP activities related to strengthening, flexibility, endurance, ROM for functional self-care, mobility, lifting and ambulation   [] (60672) Reviewed/Progressed HEP activities related to improving balance, coordination, kinesthetic sense, posture, motor skill, proprioception for self-care, mobility, lifting, and ambulation      Manual Treatments:  MFR / STM / Oscillations-Mobs:  G-I, II, III, IV / Manipulation / MLD  [] (75848) Provided manual therapy to mobilize  soft tissue/joints/fluid for the purpose of modulating pain, promoting relaxation, increasing ROM, reducing/eliminating soft tissue swelling/inflammation/restriction, improving soft tissue extensibility and allowing for proper ROM for normal function with self- care, mobility, lifting and ambulation.         Timed Code Treatment Minutes: 30   Total Treatment Minutes: 45     [] EVAL (LOW) 43543   [] EVAL (MOD) 29293   [] EVAL (HIGH) 85757   [] RE-EVAL   [x] TE (63077) x  2   [] Aquatic (72482) x  [] NMR (78327)   x  [] Aquatic Group (57528) x  [] Manual (11888) x    [] Ultrasound (86128) x  [] TA (60419) x   [] Mech Traction (26423)  [] Ionto (71710)           [] ES (un) (61841):   [] Vasopump (88569) [] Other:      Assessment  [x] Patient tolerated treatment well []

## 2020-01-13 ENCOUNTER — HOSPITAL ENCOUNTER (OUTPATIENT)
Dept: PHYSICAL THERAPY | Age: 50
Setting detail: THERAPIES SERIES
Discharge: HOME | End: 2020-01-13
Payer: COMMERCIAL

## 2020-01-13 PROCEDURE — 97110 THERAPEUTIC EXERCISES: CPT | Performed by: CHIROPRACTOR

## 2020-01-13 NOTE — FLOWSHEET NOTE
strength, flexibility, endurance, ROM for improvements with self-care, mobility, lifting and ambulation. Neuromuscular Re-Education  [] (67879) Provided verbal/tactile cueing for activities to restore or maintain balance, coordination, kinesthetic sense, posture, motor skill, proprioception for self-care, mobility, lifting, and ambulation. Therapeutic Activities:    [] (64805) Provided verbal/tactile cueing to address functional limitations related to loss of mobility, strength, balance, and coordination. Gait Training:  [] (79642) Provided training and instruction to the patient for proper postural muscle recruitment and positioning with ambulation re-education     Home Exercise Program:    [] (27040) Reviewed/Progressed HEP activities related to strengthening, flexibility, endurance, ROM for functional self-care, mobility, lifting and ambulation   [] (22033) Reviewed/Progressed HEP activities related to improving balance, coordination, kinesthetic sense, posture, motor skill, proprioception for self-care, mobility, lifting, and ambulation      Manual Treatments:  MFR / STM / Oscillations-Mobs:  G-I, II, III, IV / Manipulation / MLD  [] (70619) Provided manual therapy to mobilize  soft tissue/joints/fluid for the purpose of modulating pain, promoting relaxation, increasing ROM, reducing/eliminating soft tissue swelling/inflammation/restriction, improving soft tissue extensibility and allowing for proper ROM for normal function with self- care, mobility, lifting and ambulation.         Timed Code Treatment Minutes: 30   Total Treatment Minutes: 45     [] EVAL (LOW) 37253   [] EVAL (MOD) 70014   [] EVAL (HIGH) 63374   [] RE-EVAL   [x] TE (78509) x  2   [] Aquatic (79588) x  [] NMR (51772)   x  [] Aquatic Group (27827) x  [] Manual (99564) x    [] Ultrasound (53497) x  [] TA (16923) x   [] Mech Traction (35998)  [] Ionto (17834)           [] ES (un) (83368):   [] Vasopump (26439) [] Other:

## 2020-01-15 ENCOUNTER — APPOINTMENT (OUTPATIENT)
Dept: PHYSICAL THERAPY | Age: 50
End: 2020-01-15
Payer: COMMERCIAL

## 2020-01-15 ENCOUNTER — HOSPITAL ENCOUNTER (EMERGENCY)
Age: 50
Discharge: HOME | End: 2020-01-15
Attending: EMERGENCY MEDICINE
Payer: COMMERCIAL

## 2020-01-15 ENCOUNTER — APPOINTMENT (OUTPATIENT)
Dept: GENERAL RADIOLOGY | Age: 50
End: 2020-01-15
Payer: COMMERCIAL

## 2020-01-15 VITALS
OXYGEN SATURATION: 98 % | SYSTOLIC BLOOD PRESSURE: 127 MMHG | TEMPERATURE: 97.9 F | HEART RATE: 65 BPM | HEIGHT: 72 IN | DIASTOLIC BLOOD PRESSURE: 66 MMHG | RESPIRATION RATE: 17 BRPM | WEIGHT: 263.89 LBS | BODY MASS INDEX: 35.74 KG/M2

## 2020-01-15 LAB
ANION GAP SERPL CALCULATED.3IONS-SCNC: 11 MMOL/L (ref 3–16)
BASOPHILS ABSOLUTE: 0.1 K/UL (ref 0–0.2)
BASOPHILS RELATIVE PERCENT: 0.8 %
BUN BLDV-MCNC: 8 MG/DL (ref 7–20)
CALCIUM SERPL-MCNC: 9.2 MG/DL (ref 8.3–10.6)
CHLORIDE BLD-SCNC: 102 MMOL/L (ref 99–110)
CO2: 24 MMOL/L (ref 21–32)
CREAT SERPL-MCNC: 1 MG/DL (ref 0.9–1.3)
EOSINOPHILS ABSOLUTE: 0.5 K/UL (ref 0–0.6)
EOSINOPHILS RELATIVE PERCENT: 5.9 %
GFR AFRICAN AMERICAN: >60
GFR NON-AFRICAN AMERICAN: >60
GLUCOSE BLD-MCNC: 102 MG/DL (ref 70–99)
HCT VFR BLD CALC: 41.6 % (ref 40.5–52.5)
HEMOGLOBIN: 13.8 G/DL (ref 13.5–17.5)
LACTIC ACID: 0.7 MMOL/L (ref 0.4–2)
LYMPHOCYTES ABSOLUTE: 3.2 K/UL (ref 1–5.1)
LYMPHOCYTES RELATIVE PERCENT: 34.2 %
MCH RBC QN AUTO: 30.7 PG (ref 26–34)
MCHC RBC AUTO-ENTMCNC: 33.2 G/DL (ref 31–36)
MCV RBC AUTO: 92.5 FL (ref 80–100)
MONOCYTES ABSOLUTE: 0.7 K/UL (ref 0–1.3)
MONOCYTES RELATIVE PERCENT: 7.1 %
NEUTROPHILS ABSOLUTE: 4.8 K/UL (ref 1.7–7.7)
NEUTROPHILS RELATIVE PERCENT: 52 %
PDW BLD-RTO: 14.9 % (ref 12.4–15.4)
PLATELET # BLD: 392 K/UL (ref 135–450)
PMV BLD AUTO: 8.9 FL (ref 5–10.5)
POTASSIUM REFLEX MAGNESIUM: 4.1 MMOL/L (ref 3.5–5.1)
RBC # BLD: 4.5 M/UL (ref 4.2–5.9)
SODIUM BLD-SCNC: 137 MMOL/L (ref 136–145)
WBC # BLD: 9.3 K/UL (ref 4–11)

## 2020-01-15 PROCEDURE — 87040 BLOOD CULTURE FOR BACTERIA: CPT

## 2020-01-15 PROCEDURE — 83605 ASSAY OF LACTIC ACID: CPT

## 2020-01-15 PROCEDURE — 99283 EMERGENCY DEPT VISIT LOW MDM: CPT

## 2020-01-15 PROCEDURE — 85025 COMPLETE CBC W/AUTO DIFF WBC: CPT

## 2020-01-15 PROCEDURE — 6370000000 HC RX 637 (ALT 250 FOR IP): Performed by: EMERGENCY MEDICINE

## 2020-01-15 PROCEDURE — 80048 BASIC METABOLIC PNL TOTAL CA: CPT

## 2020-01-15 PROCEDURE — 71046 X-RAY EXAM CHEST 2 VIEWS: CPT

## 2020-01-15 RX ORDER — OXYCODONE HYDROCHLORIDE AND ACETAMINOPHEN 5; 325 MG/1; MG/1
1 TABLET ORAL ONCE
Status: COMPLETED | OUTPATIENT
Start: 2020-01-15 | End: 2020-01-15

## 2020-01-15 RX ORDER — OXYCODONE HYDROCHLORIDE AND ACETAMINOPHEN 5; 325 MG/1; MG/1
1-2 TABLET ORAL EVERY 6 HOURS PRN
Qty: 8 TABLET | Refills: 0 | Status: SHIPPED | OUTPATIENT
Start: 2020-01-15 | End: 2020-01-20 | Stop reason: ALTCHOICE

## 2020-01-15 RX ADMIN — OXYCODONE HYDROCHLORIDE AND ACETAMINOPHEN 1 TABLET: 5; 325 TABLET ORAL at 23:25

## 2020-01-15 ASSESSMENT — PAIN DESCRIPTION - ONSET: ONSET: ON-GOING

## 2020-01-15 ASSESSMENT — PAIN DESCRIPTION - FREQUENCY: FREQUENCY: CONTINUOUS

## 2020-01-15 ASSESSMENT — PAIN SCALES - GENERAL
PAINLEVEL_OUTOF10: 8
PAINLEVEL_OUTOF10: 8

## 2020-01-15 ASSESSMENT — PAIN DESCRIPTION - PROGRESSION: CLINICAL_PROGRESSION: NOT CHANGED

## 2020-01-15 ASSESSMENT — PAIN DESCRIPTION - ORIENTATION: ORIENTATION: RIGHT

## 2020-01-15 ASSESSMENT — PAIN DESCRIPTION - PAIN TYPE: TYPE: ACUTE PAIN

## 2020-01-15 ASSESSMENT — PAIN DESCRIPTION - LOCATION: LOCATION: KNEE

## 2020-01-15 ASSESSMENT — PAIN DESCRIPTION - DESCRIPTORS: DESCRIPTORS: ACHING

## 2020-01-15 ASSESSMENT — PAIN - FUNCTIONAL ASSESSMENT: PAIN_FUNCTIONAL_ASSESSMENT: PREVENTS OR INTERFERES SOME ACTIVE ACTIVITIES AND ADLS

## 2020-01-16 ENCOUNTER — OFFICE VISIT (OUTPATIENT)
Dept: ORTHOPEDIC SURGERY | Age: 50
End: 2020-01-16

## 2020-01-16 ENCOUNTER — TELEPHONE (OUTPATIENT)
Dept: PULMONOLOGY | Age: 50
End: 2020-01-16

## 2020-01-16 VITALS — TEMPERATURE: 97.8 F | BODY MASS INDEX: 35.62 KG/M2 | WEIGHT: 263 LBS | HEIGHT: 72 IN

## 2020-01-16 PROCEDURE — 99024 POSTOP FOLLOW-UP VISIT: CPT | Performed by: PHYSICIAN ASSISTANT

## 2020-01-16 NOTE — TELEPHONE ENCOUNTER
Spoke with pt to discuss issues he is having with dry mouth. Modem download show a leak. Explained to pt this could be a leak in the mask or tubing causing the auto unit to increase flow. Pt will check  And call back if issues do not resolve.

## 2020-01-16 NOTE — PROGRESS NOTES
Subjective:      Patient ID: Harlee Goldmann is a 51 y.o.  male. Chief Complaint   Patient presents with    Knee Problem     R revision TKA 12/17/19        HPI: He is here for follow-up during his revision right knee arthroplasty. This was performed on 12/17/2019. Yesterday evening after bathing his daughter, he noticed some drainage through the proximal incision. He called the on-call provider who instructed him to go to the ER for evaluation. He did present to the ER for evaluation where he was noted to be afebrile. CBC demonstrated a normal WBC. At that time there was no concern for infectious process. He states he is has not had any fevers or chills. Denies any increased knee pain. Review of Systems:   Negative for fever or chills.       Past Medical History:   Diagnosis Date    Alcoholism [facility])     last drink 2015    Allergic migraine with status migrainosus     Allergic rhinitis, seasonal     Anemia     Arthritis     right knee    Vaughn's esophagus     Benign intracranial hypertension     Cancer (HCC)     renal     Carpal tunnel syndrome     Chronic pain     Depression     Depression     GERD (gastroesophageal reflux disease)     Headache(784.0)     History of blood transfusion     History of tobacco use     Quit 7/2014    Migraine     chronic for 1 year    Morbid obesity ([address]     Movement disorder     Onychomycosis     Sleep apnea, obstructive     Severe uses cpap stop bang 6    Unspecified cerebral artery occlusion with cerebral infarction 2014    slight weakness in left arm    Wears dentures     full set    Wears glasses        Family History   Problem Relation Age of Onset    Cancer Father         Lymphoma    Dementia Other     Diabetes Other     Cancer Other     Diabetes Maternal Uncle     Heart Disease Maternal Uncle     Depression Other        Past Surgical History:   Procedure Laterality Date    ABDOMEN SURGERY      gastric bypass    ABDOMEN SURGERY 2016    repair of recurrent incisional hernia with mesh, removal of old mesh, bilateral component separation    ABDOMINAL EXPLORATION SURGERY  16    exp lap, lysis of adhesions, small bowel resection    CARPAL TUNNEL RELEASE      bilat    DILATATION, ESOPHAGUS      ENDOSCOPY, COLON, DIAGNOSTIC      EYE SURGERY      GASTRIC BYPASS SURGERY  2009    Has lost about 200 pounds.  HERNIA REPAIR  3-    ventral    JOINT REPLACEMENT      KIDNEY REMOVAL Left 2018    KNEE ARTHROSCOPY Right 2013    Dr.Robert Sanders     KNEE ARTHROSCOPY Right 12    RIGHT KNEE ARTHROSCOPY WITH CHONDROPLASTY    KNEE SURGERY  2012    right, arthroscopy    LAPAROTOMY      LASIK      OTHER SURGICAL HISTORY Left 2016    CT biopsy ablasion left kidney    OTHER SURGICAL HISTORY      small intestine 12 inch removed, 2 hernia surgeries, another surgery to drain infection from incision.  REVISION TOTAL KNEE ARTHROPLASTY Right 2019    RIGHT REVISION TIBIA TOTAL KNEE REPLACEMENT performed by Bruno Curry MD at [address] [facility]  10/15/13    RIGHT    UPPER GASTROINTESTINAL ENDOSCOPY  2016    UPPER GASTROINTESTINAL ENDOSCOPY  2018       Social History     Occupational History    Occupation: [employer] his son.    Tobacco Use    Smoking status: Former Smoker     Packs/day: 0.50     Years: 25.00     Pack years: 12.50     Types: Cigarettes     Last attempt to quit: 2017     Years since quittin.4    Smokeless tobacco: Never Used   Substance and Sexual Activity    Alcohol use: No     Alcohol/week: 0.0 standard drinks     Comment: last drink     Drug use: No     Comment: history of    Sexual activity: Yes     Partners: Female     Comment: wife Dar Shaffer       Current Outpatient Medications   Medication Sig Dispense Refill    oxyCODONE-acetaminophen (PERCOCET) 5-325 MG per tablet Take 1-2 tablets by mouth every 6 hours as needed for Pain for up to 7 days. 8 tablet 0    zolpidem (AMBIEN) 10 MG tablet Take 10 mg by mouth nightly as needed. 5    dicyclomine (BENTYL) 20 MG tablet Take 40 mg by mouth 2 times daily as needed  0    aspirin EC 81 MG EC tablet Take 1 tablet by mouth 2 times daily for 14 days Please avoid missing doses. 28 tablet 0    atorvastatin (LIPITOR) 40 MG tablet TAKE 1 TABLET BY MOUTH EVERY NIGHT AT BEDTIME 90 tablet 1    traZODone (DESYREL) 100 MG tablet Take 2 tablets by mouth nightly 180 tablet 0    FLUoxetine (PROZAC) 20 MG capsule TAKE 1 CAPSULE BY MOUTH DAILY 90 capsule 1    Meningococcal B Vac, Recomb, JT 0.5 ml IM x1 (repeat at 1-2 months and 6  Months) 0.5 mL 2    sildenafil (REVATIO) 20 MG tablet Take 4 tablets by mouth as needed (30 min prior to intercourse) 30 tablet 5    pantoprazole (PROTONIX) 40 MG tablet Take 1 tablet by mouth daily (Patient taking differently: Take 40 mg by mouth 2 times daily ) 30 tablet 3    calcium-vitamin D (OSCAL 500/200 D-3) 500-200 MG-UNIT per tablet Take 1 tablet by mouth 2 times daily       Multiple Vitamin TABS Take 1 tablet by mouth daily        No current facility-administered medications for this visit. Objective:   He is alert, oriented x 3, pleasant, well nourished, developed and in no acute distress. Temp 97.8 °F (36.6 °C) (Temporal)   Ht 6' (1.829 m)   Wt 263 lb (119.3 kg)   BMI 35.67 kg/m²      Examination of the right knee: The alignment of the knee is neutral.   There is mild erythema over the middle aspect of the incision. There is mild soft tissue swelling. There is mild drainage on the dressing from last evening. However, there is no active drainage at the present time. There is no effusion. ROM-  Extension 0          -   Flexion  125   There mild pain associated with ROM testing. Extensor Mechanism is  intact. Results for Craig Harrison (MRN [id number]) as of 1/16/2020 09:26   Ref. notify the office. Follow Up: 4 days in the clinic. Call or return to clinic prn if these symptoms worsen or fail to improve as anticipated.

## 2020-01-16 NOTE — ED PROVIDER NOTES
[facility]  eMERGENCY dEPARTMENT eNCOUnter      Pt Name: Jeff Kelley  MRN: [id number]  [place] 1970  Date of evaluation: 1/15/2020  Provider: Jeannie Jacques MD  PCP: Tay Rubio MD      CHIEF COMPLAINT       Chief Complaint   Patient presents with    Knee Pain     right knee pain. pt with fluid build up in knee and wound is leaking. recent knee surgery       HISTORY OFPRESENT ILLNESS   (Location/Symptom, Timing/Onset, Context/Setting, Quality, Duration, Modifying Factors,Severity)  Note limiting factors. Jeff Kelley is a 51 y.o. male presents with some right knee pain said he had a buildup of fluid in the knee and now he feels like the knee is leaking recently had some surgery he noticed that after he was doing some physical therapy he rates the pain at an 8 out of 10    Nursing Notes were all reviewed and agreed with or any disagreements were addressed  in the HPI. REVIEW OF SYSTEMS    (2-9 systems for level 4, 10 or more for level 5)     Review of Systems    Positives and Pertinent negatives as per HPI. Except as noted above in the ROS, all other systems were reviewed andnegative.        PASTMEDICAL HISTORY     Past Medical History:   Diagnosis Date    Alcoholism [facility])     last drink 2015    Allergic migraine with status migrainosus     Allergic rhinitis, seasonal     Anemia     Arthritis     right knee    Vaughn's esophagus     Benign intracranial hypertension     Cancer (HCC)     renal     Carpal tunnel syndrome     Chronic pain     Depression     Depression     GERD (gastroesophageal reflux disease)     Headache(784.0)     History of blood transfusion     History of tobacco use     Quit 7/2014    Migraine     chronic for 1 year    Morbid obesity ([address]     Movement disorder     Onychomycosis     Sleep apnea, obstructive     Severe uses cpap stop bang 6    Unspecified cerebral artery occlusion with TO MG FOR LOW K - Abnormal; Notable for the following components:       Result Value    Glucose 102 (*)     All other components within normal limits    Narrative:     Performed at:  [facility]  [address] [place], [place] [address]   Phone [phone]   CULTURE BLOOD #1   CULTURE BLOOD #2   CBC WITH AUTO DIFFERENTIAL    Narrative:     Performed at:  [facility]  [address] [place], [place] [address]   Phone [phone]   LACTIC ACID, PLASMA    Narrative:     Performed at:  [facility]  [address] [place], [place] [address]   Phone [phone]   LACTIC ACID, PLASMA   URINE RT REFLEX TO CULTURE       All other labs were within normal range or not returned as of this dictation. EKG: All EKG's are interpreted by the Emergency Department Physician who eithersigns or Co-signs this chart in the absence of a cardiologist.        RADIOLOGY:   Non-plain film images such as CT, Ultrasound and MRI are read by the radiologist. Plain radiographic images are visualized by myself. *    Interpretation per the Radiologist below, if available at the time of this note:    XR CHEST STANDARD (2 VW)   Final Result   No acute process. PROCEDURES   Unless otherwise noted below, none     Procedures    *    CRITICAL CARE TIME   N/A      EMERGENCY DEPARTMENT COURSE and DIFFERENTIALDIAGNOSIS/MDM:   Vitals:    Vitals:    01/15/20 2157   BP: (!) 104/59   Pulse: 65   Resp: 17   Temp: 97.9 °F (36.6 °C)   TempSrc: Oral   SpO2: 98%   Weight: 263 lb 14.3 oz (119.7 kg)   Height: 6' (1.829 m)       Patient was given thefollowing medications:  Medications   oxyCODONE-acetaminophen (PERCOCET) 5-325 MG per tablet 1 tablet (1 tablet Oral Given 1/15/20 1500)           The patient tolerated their visit well.    The patient and / or the familywere informed of the results of any tests, a time was given to answer questions. FINAL IMPRESSION      1. Postoperative pain of knee          DISPOSITION/PLAN   DISPOSITION Decision To Discharge 01/15/2020 11:24:55 PM  The swelling is negligible plan is for discharge and follow-up with orthopedist    PATIENT REFERRED TO:  your orthopaedist    In 1 day        DISCHARGE MEDICATIONS:  New Prescriptions    OXYCODONE-ACETAMINOPHEN (PERCOCET) 5-325 MG PER TABLET    Take 1-2 tablets by mouth every 6 hours as needed for Pain for up to 7 days.        DISCONTINUED MEDICATIONS:  Discontinued Medications    No medications on file              (Please note that portions of this note were completed with a voice recognition program.  Efforts were made to edit the dictations but occasionally words are mis-transcribed.)    Bessy Banegas MD (electronically signed)      Bessy Banegas MD  01/15/20 8892

## 2020-01-17 ENCOUNTER — HOSPITAL ENCOUNTER (OUTPATIENT)
Dept: PHYSICAL THERAPY | Age: 50
Setting detail: THERAPIES SERIES
Discharge: HOME | End: 2020-01-17
Payer: COMMERCIAL

## 2020-01-17 PROCEDURE — G0283 ELEC STIM OTHER THAN WOUND: HCPCS | Performed by: CHIROPRACTOR

## 2020-01-17 PROCEDURE — 97110 THERAPEUTIC EXERCISES: CPT | Performed by: CHIROPRACTOR

## 2020-01-17 NOTE — FLOWSHEET NOTE
Physical Therapy Daily Treatment Note  Date:  2020    Patient Name:  Fran Resendiz    :  1970  MRN: [id number]    Restrictions/Precautions:   Fall Risk(Low fall risk)  Pertinent Medical History:     Arthritis, CA, Initial R TKA     Medical/Treatment Diagnosis Information:  · Diagnosis: R TKA revision (polyehylene exchange)       Insurance/Certification information:  BCBS  Physician Information:     JEMAL Servin  Plan of care signed (Y/N):  Inbox    Visit# / total visits:   Pain level: 1/10     Functional Outcomes Measure:  Test: LEFS  Score: 44    Progress Note: []  Yes  [x]  No  Next due by: Visit #10      History of Injury:   R TKA , Revision (polyethylene exchange) 19    Subjective:   States that he overused his knee over the weekend and now c/o increased edema  Objective:   Observation:    Test measurements:  R knee PROM:     Exercises:  Exercise/Equipment Resistance/Repetitions Other comments        Nu-step   #9 L-0 X 5 min         GSS/ HSS 30 sec x 2    Knee flex on stairs 10 sec x 4  R    Mini-squats 1x15    HR/TR 1x15    T-band TKE add         Leg Press    #7 60# - 2x10          FAQ 11# - 2x10 R    HS Curl 11# - 1x10  R                        SLR 1# - 2x10    R    SAQ 2# - 2x10    R    Heel slides               Passive stretch flex                             Ext  X 10 R  X 10 R         CP X 15 min      Other Therapeutic Activities:      Home Exercise Program:  Voiced understanding of home exer    Manual Treatments:     Modalities:   CP    Progression Towards Functional goals:  [] Patient is progressing as expected towards functional goals listed. [] Progression is slowed due to complexities listed. [] Progression has been slowed due to co-morbidities. [] Plan just implemented, too soon to assess goals progression  [x] Other: drove to PT without problem. Charges:     Therapeutic Exercise:  [x] (58044) Provided verbal/tactile cueing for activities to

## 2020-01-19 LAB — BLOOD CULTURE, ROUTINE: NORMAL

## 2020-01-20 ENCOUNTER — OFFICE VISIT (OUTPATIENT)
Dept: ORTHOPEDIC SURGERY | Age: 50
End: 2020-01-20

## 2020-01-20 ENCOUNTER — HOSPITAL ENCOUNTER (OUTPATIENT)
Dept: PHYSICAL THERAPY | Age: 50
Setting detail: THERAPIES SERIES
Discharge: HOME | End: 2020-01-20
Payer: COMMERCIAL

## 2020-01-20 VITALS — TEMPERATURE: 98.3 F

## 2020-01-20 PROCEDURE — 99024 POSTOP FOLLOW-UP VISIT: CPT | Performed by: PHYSICIAN ASSISTANT

## 2020-01-20 PROCEDURE — APPSS15 APP SPLIT SHARED TIME 0-15 MINUTES: Performed by: PHYSICIAN ASSISTANT

## 2020-01-20 RX ORDER — CEPHALEXIN 500 MG/1
500 CAPSULE ORAL 4 TIMES DAILY
Qty: 28 CAPSULE | Refills: 0 | Status: ON HOLD | OUTPATIENT
Start: 2020-01-20 | End: 2020-01-24 | Stop reason: HOSPADM

## 2020-01-20 RX ORDER — OXYCODONE HYDROCHLORIDE 5 MG/1
5-10 TABLET ORAL EVERY 4 HOURS PRN
Qty: 40 TABLET | Refills: 0 | Status: SHIPPED | OUTPATIENT
Start: 2020-01-20 | End: 2020-01-27

## 2020-01-20 NOTE — PROGRESS NOTES
Subjective:      Patient ID: Cassandra Mcfarland is a 51 y.o. male. Chief Complaint   Patient presents with    Knee Problem     Right revision TKA 12.17.2019        HPI:   He for follow up visit. S/P right knee revision arthroplasty. The date of procedure(s) 12/17/2019. He is knee revision arthroplasty. He reports increased pain and swelling since physical therapy on Friday. Denies any further drainage from his knee incision. Denies any fevers or chills. He underwent a revision knee arthroplasty on 12/17/2019 for medial lateral instability. He underwent a polyethylene exchange to a thicker polyethylene. Last Wednesday, 1/15/2020 after bathing his daughter he noticed drainage from the superior aspect of his wound. He did call the on-call provider who was in touch with Dr. Felix Cheadle  It was recommended that he go to the ER to be evaluated and possibly receive an IV antibiotic. Once evaluated in the ER, the ER physician was not concerned about infection at that time. However, they did obtain WBC which was normal.  I did see him the following day on 1/16/2020 and he had minimal erythema. This wound had stopped draining. He states he resumed his normal physical therapy and on Friday felt like therapy was more painful than usual.  Today he is complaining of increased swelling in the right knee and increased anterior knee pain. Pain is currently 3/10 at rest and 10/10 with weightbearing and flexion of the knee. Review of Systems:   He denies fever or chills. He  denies any other complaints.       Past Medical History:   Diagnosis Date    Alcoholism [facility])     last drink 2015    Allergic migraine with status migrainosus     Allergic rhinitis, seasonal     Anemia     Arthritis     right knee    Vaughn's esophagus     Benign intracranial hypertension     Cancer (HCC)     renal     Carpal tunnel syndrome     Chronic pain     Depression     Depression     GERD (gastroesophageal reflux GASTROINTESTINAL ENDOSCOPY  2016    UPPER GASTROINTESTINAL ENDOSCOPY  2018       Social History     Occupational History    Occupation: [employer] his son. Tobacco Use    Smoking status: Former Smoker     Packs/day: 0.50     Years: 25.00     Pack years: 12.50     Types: Cigarettes     Last attempt to quit: 2017     Years since quittin.4    Smokeless tobacco: Never Used   Substance and Sexual Activity    Alcohol use: No     Alcohol/week: 0.0 standard drinks     Comment: last drink     Drug use: No     Comment: history of    Sexual activity: Yes     Partners: Female     Comment: wife [place]       Current Outpatient Medications   Medication Sig Dispense Refill    oxyCODONE (ROXICODONE) 5 MG immediate release tablet Take 1-2 tablets by mouth every 4 hours as needed for Pain for up to 7 days. 40 tablet 0    cephALEXin (KEFLEX) 500 MG capsule Take 1 capsule by mouth 4 times daily for 7 days 28 capsule 0    oxyCODONE-acetaminophen (PERCOCET) 5-325 MG per tablet Take 1-2 tablets by mouth every 6 hours as needed for Pain for up to 7 days. 8 tablet 0    zolpidem (AMBIEN) 10 MG tablet Take 10 mg by mouth nightly as needed. 5    dicyclomine (BENTYL) 20 MG tablet Take 40 mg by mouth 2 times daily as needed  0    aspirin EC 81 MG EC tablet Take 1 tablet by mouth 2 times daily for 14 days Please avoid missing doses.  28 tablet 0    atorvastatin (LIPITOR) 40 MG tablet TAKE 1 TABLET BY MOUTH EVERY NIGHT AT BEDTIME 90 tablet 1    traZODone (DESYREL) 100 MG tablet Take 2 tablets by mouth nightly 180 tablet 0    FLUoxetine (PROZAC) 20 MG capsule TAKE 1 CAPSULE BY MOUTH DAILY 90 capsule 1    Meningococcal B Vac, Recomb, JT 0.5 ml IM x1 (repeat at 1-2 months and 6  Months) 0.5 mL 2    sildenafil (REVATIO) 20 MG tablet Take 4 tablets by mouth as needed (30 min prior to intercourse) 30 tablet 5    pantoprazole (PROTONIX) 40 MG tablet Take 1 tablet by mouth daily (Patient surgical. The standard recovery time, need for probable physical therapy post op, risks and benefits of the proposed surgical procedure-right knee I&D with possible polyethylene exchange. Risks of the proposed procedure were discussed including but not limited to , infection, decreased range of motion, continued pain, instability, fracture, dislocation, neurovascular injury, postop cognitive disorder, leg leg discrepancy, DVT, pulmonary embolism and need for further surgical procedures. Follow Up: Post op. Call or return to clinic prn if these symptoms worsen or fail to improve as anticipated.

## 2020-01-21 ENCOUNTER — ANESTHESIA EVENT (OUTPATIENT)
Dept: OPERATING ROOM | Age: 50
DRG: 487 | End: 2020-01-21
Payer: COMMERCIAL

## 2020-01-22 ENCOUNTER — APPOINTMENT (OUTPATIENT)
Dept: PHYSICAL THERAPY | Age: 50
End: 2020-01-22
Payer: COMMERCIAL

## 2020-01-22 ENCOUNTER — ANESTHESIA (OUTPATIENT)
Dept: OPERATING ROOM | Age: 50
DRG: 487 | End: 2020-01-22
Payer: COMMERCIAL

## 2020-01-22 ENCOUNTER — HOSPITAL ENCOUNTER (INPATIENT)
Age: 50
LOS: 3 days | Discharge: HOME | DRG: 487 | End: 2020-01-25
Attending: ORTHOPAEDIC SURGERY | Admitting: ORTHOPAEDIC SURGERY
Payer: COMMERCIAL

## 2020-01-22 VITALS
DIASTOLIC BLOOD PRESSURE: 74 MMHG | RESPIRATION RATE: 2 BRPM | TEMPERATURE: 96.8 F | OXYGEN SATURATION: 91 % | SYSTOLIC BLOOD PRESSURE: 121 MMHG

## 2020-01-22 PROBLEM — T84.59XA INFECTION OF TOTAL KNEE REPLACEMENT (HCC): Status: ACTIVE | Noted: 2020-01-22

## 2020-01-22 PROBLEM — Z96.659 INFECTION OF TOTAL KNEE REPLACEMENT (HCC): Status: ACTIVE | Noted: 2020-01-22

## 2020-01-22 LAB
C-REACTIVE PROTEIN: 47.8 MG/L (ref 0–5.1)
GLUCOSE BLD-MCNC: 183 MG/DL (ref 70–99)
GLUCOSE BLD-MCNC: 96 MG/DL (ref 70–99)
PERFORMED ON: ABNORMAL
PERFORMED ON: NORMAL
SEDIMENTATION RATE, ERYTHROCYTE: 9 MM/HR (ref 0–15)

## 2020-01-22 PROCEDURE — 2709999900 HC NON-CHARGEABLE SUPPLY: Performed by: ORTHOPAEDIC SURGERY

## 2020-01-22 PROCEDURE — 6370000000 HC RX 637 (ALT 250 FOR IP): Performed by: ORTHOPAEDIC SURGERY

## 2020-01-22 PROCEDURE — 7100000000 HC PACU RECOVERY - FIRST 15 MIN: Performed by: ORTHOPAEDIC SURGERY

## 2020-01-22 PROCEDURE — 3600000005 HC SURGERY LEVEL 5 BASE: Performed by: ORTHOPAEDIC SURGERY

## 2020-01-22 PROCEDURE — 2500000003 HC RX 250 WO HCPCS: Performed by: NURSE ANESTHETIST, CERTIFIED REGISTERED

## 2020-01-22 PROCEDURE — 94150 VITAL CAPACITY TEST: CPT

## 2020-01-22 PROCEDURE — 2720000010 HC SURG SUPPLY STERILE: Performed by: ORTHOPAEDIC SURGERY

## 2020-01-22 PROCEDURE — 6360000002 HC RX W HCPCS: Performed by: NURSE ANESTHETIST, CERTIFIED REGISTERED

## 2020-01-22 PROCEDURE — 3600000015 HC SURGERY LEVEL 5 ADDTL 15MIN: Performed by: ORTHOPAEDIC SURGERY

## 2020-01-22 PROCEDURE — 3700000001 HC ADD 15 MINUTES (ANESTHESIA): Performed by: ORTHOPAEDIC SURGERY

## 2020-01-22 PROCEDURE — 87075 CULTR BACTERIA EXCEPT BLOOD: CPT

## 2020-01-22 PROCEDURE — 94760 N-INVAS EAR/PLS OXIMETRY 1: CPT

## 2020-01-22 PROCEDURE — 6360000002 HC RX W HCPCS: Performed by: ORTHOPAEDIC SURGERY

## 2020-01-22 PROCEDURE — 99255 IP/OBS CONSLTJ NEW/EST HI 80: CPT | Performed by: INTERNAL MEDICINE

## 2020-01-22 PROCEDURE — 87015 SPECIMEN INFECT AGNT CONCNTJ: CPT

## 2020-01-22 PROCEDURE — 6360000002 HC RX W HCPCS: Performed by: ANESTHESIOLOGY

## 2020-01-22 PROCEDURE — 3700000000 HC ANESTHESIA ATTENDED CARE: Performed by: ORTHOPAEDIC SURGERY

## 2020-01-22 PROCEDURE — 2580000003 HC RX 258: Performed by: ORTHOPAEDIC SURGERY

## 2020-01-22 PROCEDURE — 1200000000 HC SEMI PRIVATE

## 2020-01-22 PROCEDURE — 85652 RBC SED RATE AUTOMATED: CPT

## 2020-01-22 PROCEDURE — C1776 JOINT DEVICE (IMPLANTABLE): HCPCS | Performed by: ORTHOPAEDIC SURGERY

## 2020-01-22 PROCEDURE — 6360000002 HC RX W HCPCS: Performed by: INTERNAL MEDICINE

## 2020-01-22 PROCEDURE — 7100000001 HC PACU RECOVERY - ADDTL 15 MIN: Performed by: ORTHOPAEDIC SURGERY

## 2020-01-22 PROCEDURE — 2500000003 HC RX 250 WO HCPCS: Performed by: ORTHOPAEDIC SURGERY

## 2020-01-22 PROCEDURE — 87205 SMEAR GRAM STAIN: CPT

## 2020-01-22 PROCEDURE — 0SPC09Z REMOVAL OF LINER FROM RIGHT KNEE JOINT, OPEN APPROACH: ICD-10-PCS | Performed by: ORTHOPAEDIC SURGERY

## 2020-01-22 PROCEDURE — 0SUV09Z SUPPLEMENT RIGHT KNEE JOINT, TIBIAL SURFACE WITH LINER, OPEN APPROACH: ICD-10-PCS | Performed by: ORTHOPAEDIC SURGERY

## 2020-01-22 PROCEDURE — 87077 CULTURE AEROBIC IDENTIFY: CPT

## 2020-01-22 PROCEDURE — 86140 C-REACTIVE PROTEIN: CPT

## 2020-01-22 PROCEDURE — 87070 CULTURE OTHR SPECIMN AEROBIC: CPT

## 2020-01-22 PROCEDURE — 88300 SURGICAL PATH GROSS: CPT

## 2020-01-22 PROCEDURE — 2580000003 HC RX 258: Performed by: ANESTHESIOLOGY

## 2020-01-22 PROCEDURE — 36415 COLL VENOUS BLD VENIPUNCTURE: CPT

## 2020-01-22 PROCEDURE — 89050 BODY FLUID CELL COUNT: CPT

## 2020-01-22 PROCEDURE — 87176 TISSUE HOMOGENIZATION CULTR: CPT

## 2020-01-22 DEVICE — IMPLANTABLE DEVICE
Type: IMPLANTABLE DEVICE | Site: KNEE | Status: NON-FUNCTIONAL
Removed: 2020-08-12

## 2020-01-22 RX ORDER — TRANEXAMIC ACID 100 MG/ML
INJECTION, SOLUTION INTRAVENOUS
Status: COMPLETED | OUTPATIENT
Start: 2020-01-22 | End: 2020-01-22

## 2020-01-22 RX ORDER — MIDAZOLAM HYDROCHLORIDE 1 MG/ML
INJECTION INTRAMUSCULAR; INTRAVENOUS PRN
Status: DISCONTINUED | OUTPATIENT
Start: 2020-01-22 | End: 2020-01-22 | Stop reason: SDUPTHER

## 2020-01-22 RX ORDER — SENNA AND DOCUSATE SODIUM 50; 8.6 MG/1; MG/1
1 TABLET, FILM COATED ORAL 2 TIMES DAILY
Status: DISCONTINUED | OUTPATIENT
Start: 2020-01-22 | End: 2020-01-25 | Stop reason: HOSPADM

## 2020-01-22 RX ORDER — PROPOFOL 10 MG/ML
INJECTION, EMULSION INTRAVENOUS PRN
Status: DISCONTINUED | OUTPATIENT
Start: 2020-01-22 | End: 2020-01-22 | Stop reason: SDUPTHER

## 2020-01-22 RX ORDER — MULTIVITAMIN WITH FOLIC ACID 400 MCG
1 TABLET ORAL DAILY
Status: DISCONTINUED | OUTPATIENT
Start: 2020-01-22 | End: 2020-01-25 | Stop reason: HOSPADM

## 2020-01-22 RX ORDER — SODIUM CHLORIDE 0.9 % (FLUSH) 0.9 %
10 SYRINGE (ML) INJECTION PRN
Status: DISCONTINUED | OUTPATIENT
Start: 2020-01-22 | End: 2020-01-22 | Stop reason: HOSPADM

## 2020-01-22 RX ORDER — SODIUM CHLORIDE 0.9 % (FLUSH) 0.9 %
10 SYRINGE (ML) INJECTION PRN
Status: DISCONTINUED | OUTPATIENT
Start: 2020-01-22 | End: 2020-01-25 | Stop reason: HOSPADM

## 2020-01-22 RX ORDER — FENTANYL CITRATE 50 UG/ML
50 INJECTION, SOLUTION INTRAMUSCULAR; INTRAVENOUS EVERY 5 MIN PRN
Status: COMPLETED | OUTPATIENT
Start: 2020-01-22 | End: 2020-01-22

## 2020-01-22 RX ORDER — MIDAZOLAM HYDROCHLORIDE 1 MG/ML
2 INJECTION INTRAMUSCULAR; INTRAVENOUS ONCE
Status: COMPLETED | OUTPATIENT
Start: 2020-01-22 | End: 2020-01-22

## 2020-01-22 RX ORDER — ONDANSETRON 2 MG/ML
4 INJECTION INTRAMUSCULAR; INTRAVENOUS
Status: DISCONTINUED | OUTPATIENT
Start: 2020-01-22 | End: 2020-01-22 | Stop reason: HOSPADM

## 2020-01-22 RX ORDER — SODIUM CHLORIDE 0.9 % (FLUSH) 0.9 %
10 SYRINGE (ML) INJECTION EVERY 12 HOURS SCHEDULED
Status: DISCONTINUED | OUTPATIENT
Start: 2020-01-22 | End: 2020-01-25 | Stop reason: HOSPADM

## 2020-01-22 RX ORDER — DICYCLOMINE HCL 20 MG
40 TABLET ORAL 2 TIMES DAILY PRN
Status: DISCONTINUED | OUTPATIENT
Start: 2020-01-22 | End: 2020-01-25 | Stop reason: HOSPADM

## 2020-01-22 RX ORDER — SUCCINYLCHOLINE/SOD CL,ISO/PF 200MG/10ML
SYRINGE (ML) INTRAVENOUS PRN
Status: DISCONTINUED | OUTPATIENT
Start: 2020-01-22 | End: 2020-01-22 | Stop reason: SDUPTHER

## 2020-01-22 RX ORDER — ROCURONIUM BROMIDE 10 MG/ML
INJECTION, SOLUTION INTRAVENOUS PRN
Status: DISCONTINUED | OUTPATIENT
Start: 2020-01-22 | End: 2020-01-22 | Stop reason: SDUPTHER

## 2020-01-22 RX ORDER — HYDRALAZINE HYDROCHLORIDE 20 MG/ML
5 INJECTION INTRAMUSCULAR; INTRAVENOUS EVERY 10 MIN PRN
Status: DISCONTINUED | OUTPATIENT
Start: 2020-01-22 | End: 2020-01-22 | Stop reason: HOSPADM

## 2020-01-22 RX ORDER — PANTOPRAZOLE SODIUM 40 MG/1
40 TABLET, DELAYED RELEASE ORAL DAILY
Status: DISCONTINUED | OUTPATIENT
Start: 2020-01-23 | End: 2020-01-23

## 2020-01-22 RX ORDER — DEXTROSE MONOHYDRATE 50 MG/ML
100 INJECTION, SOLUTION INTRAVENOUS PRN
Status: DISCONTINUED | OUTPATIENT
Start: 2020-01-22 | End: 2020-01-25 | Stop reason: HOSPADM

## 2020-01-22 RX ORDER — MEPERIDINE HYDROCHLORIDE 25 MG/ML
12.5 INJECTION INTRAMUSCULAR; INTRAVENOUS; SUBCUTANEOUS EVERY 5 MIN PRN
Status: DISCONTINUED | OUTPATIENT
Start: 2020-01-22 | End: 2020-01-22

## 2020-01-22 RX ORDER — NICOTINE POLACRILEX 4 MG
15 LOZENGE BUCCAL PRN
Status: DISCONTINUED | OUTPATIENT
Start: 2020-01-22 | End: 2020-01-25 | Stop reason: HOSPADM

## 2020-01-22 RX ORDER — MAGNESIUM HYDROXIDE 1200 MG/15ML
LIQUID ORAL CONTINUOUS PRN
Status: COMPLETED | OUTPATIENT
Start: 2020-01-22 | End: 2020-01-22

## 2020-01-22 RX ORDER — TRAZODONE HYDROCHLORIDE 100 MG/1
200 TABLET ORAL NIGHTLY
Status: DISCONTINUED | OUTPATIENT
Start: 2020-01-22 | End: 2020-01-25 | Stop reason: HOSPADM

## 2020-01-22 RX ORDER — OXYCODONE HYDROCHLORIDE 10 MG/1
10 TABLET ORAL EVERY 4 HOURS PRN
Status: DISCONTINUED | OUTPATIENT
Start: 2020-01-22 | End: 2020-01-25 | Stop reason: HOSPADM

## 2020-01-22 RX ORDER — FLUOXETINE HYDROCHLORIDE 20 MG/1
20 CAPSULE ORAL DAILY
Status: DISCONTINUED | OUTPATIENT
Start: 2020-01-22 | End: 2020-01-25 | Stop reason: HOSPADM

## 2020-01-22 RX ORDER — ONDANSETRON 2 MG/ML
4 INJECTION INTRAMUSCULAR; INTRAVENOUS EVERY 6 HOURS PRN
Status: DISCONTINUED | OUTPATIENT
Start: 2020-01-22 | End: 2020-01-25 | Stop reason: HOSPADM

## 2020-01-22 RX ORDER — FENTANYL CITRATE 50 UG/ML
INJECTION, SOLUTION INTRAMUSCULAR; INTRAVENOUS PRN
Status: DISCONTINUED | OUTPATIENT
Start: 2020-01-22 | End: 2020-01-22 | Stop reason: SDUPTHER

## 2020-01-22 RX ORDER — SODIUM CHLORIDE 9 MG/ML
INJECTION, SOLUTION INTRAVENOUS CONTINUOUS
Status: DISCONTINUED | OUTPATIENT
Start: 2020-01-22 | End: 2020-01-22

## 2020-01-22 RX ORDER — SODIUM CHLORIDE 0.9 % (FLUSH) 0.9 %
10 SYRINGE (ML) INJECTION EVERY 12 HOURS SCHEDULED
Status: DISCONTINUED | OUTPATIENT
Start: 2020-01-22 | End: 2020-01-22 | Stop reason: HOSPADM

## 2020-01-22 RX ORDER — LIDOCAINE HYDROCHLORIDE 20 MG/ML
INJECTION, SOLUTION EPIDURAL; INFILTRATION; INTRACAUDAL; PERINEURAL PRN
Status: DISCONTINUED | OUTPATIENT
Start: 2020-01-22 | End: 2020-01-22 | Stop reason: SDUPTHER

## 2020-01-22 RX ORDER — SODIUM CHLORIDE, SODIUM LACTATE, POTASSIUM CHLORIDE, CALCIUM CHLORIDE 600; 310; 30; 20 MG/100ML; MG/100ML; MG/100ML; MG/100ML
INJECTION, SOLUTION INTRAVENOUS CONTINUOUS
Status: DISCONTINUED | OUTPATIENT
Start: 2020-01-22 | End: 2020-01-22 | Stop reason: ALTCHOICE

## 2020-01-22 RX ORDER — OXYCODONE HYDROCHLORIDE 5 MG/1
5 TABLET ORAL EVERY 4 HOURS PRN
Status: DISCONTINUED | OUTPATIENT
Start: 2020-01-22 | End: 2020-01-25 | Stop reason: HOSPADM

## 2020-01-22 RX ORDER — DEXTROSE MONOHYDRATE 25 G/50ML
12.5 INJECTION, SOLUTION INTRAVENOUS PRN
Status: DISCONTINUED | OUTPATIENT
Start: 2020-01-22 | End: 2020-01-25 | Stop reason: HOSPADM

## 2020-01-22 RX ORDER — ONDANSETRON 2 MG/ML
INJECTION INTRAMUSCULAR; INTRAVENOUS PRN
Status: DISCONTINUED | OUTPATIENT
Start: 2020-01-22 | End: 2020-01-22 | Stop reason: SDUPTHER

## 2020-01-22 RX ORDER — SODIUM CHLORIDE 9 MG/ML
INJECTION, SOLUTION INTRAVENOUS CONTINUOUS
Status: DISCONTINUED | OUTPATIENT
Start: 2020-01-22 | End: 2020-01-25 | Stop reason: HOSPADM

## 2020-01-22 RX ORDER — VANCOMYCIN HYDROCHLORIDE 1 G/20ML
INJECTION, POWDER, LYOPHILIZED, FOR SOLUTION INTRAVENOUS
Status: COMPLETED | OUTPATIENT
Start: 2020-01-22 | End: 2020-01-22

## 2020-01-22 RX ORDER — ATORVASTATIN CALCIUM 40 MG/1
40 TABLET, FILM COATED ORAL NIGHTLY
Status: DISCONTINUED | OUTPATIENT
Start: 2020-01-22 | End: 2020-01-25 | Stop reason: HOSPADM

## 2020-01-22 RX ORDER — ZOLPIDEM TARTRATE 5 MG/1
10 TABLET ORAL NIGHTLY PRN
Status: DISCONTINUED | OUTPATIENT
Start: 2020-01-22 | End: 2020-01-25 | Stop reason: HOSPADM

## 2020-01-22 RX ORDER — ACETAMINOPHEN 325 MG/1
650 TABLET ORAL EVERY 6 HOURS
Status: DISCONTINUED | OUTPATIENT
Start: 2020-01-22 | End: 2020-01-25 | Stop reason: HOSPADM

## 2020-01-22 RX ORDER — DOCUSATE SODIUM 100 MG/1
100 CAPSULE, LIQUID FILLED ORAL 2 TIMES DAILY
Status: DISCONTINUED | OUTPATIENT
Start: 2020-01-22 | End: 2020-01-25 | Stop reason: HOSPADM

## 2020-01-22 RX ORDER — FENTANYL CITRATE 50 UG/ML
25 INJECTION, SOLUTION INTRAMUSCULAR; INTRAVENOUS EVERY 5 MIN PRN
Status: DISCONTINUED | OUTPATIENT
Start: 2020-01-22 | End: 2020-01-22 | Stop reason: HOSPADM

## 2020-01-22 RX ADMIN — LIDOCAINE HYDROCHLORIDE 40 MG: 20 INJECTION, SOLUTION EPIDURAL; INFILTRATION; INTRACAUDAL; PERINEURAL at 12:23

## 2020-01-22 RX ADMIN — ZOLPIDEM TARTRATE 10 MG: 5 TABLET ORAL at 22:16

## 2020-01-22 RX ADMIN — ACETAMINOPHEN 650 MG: 325 TABLET ORAL at 21:07

## 2020-01-22 RX ADMIN — Medication 160 MG: at 11:13

## 2020-01-22 RX ADMIN — HYDROMORPHONE HYDROCHLORIDE 0.25 MG: 1 INJECTION, SOLUTION INTRAMUSCULAR; INTRAVENOUS; SUBCUTANEOUS at 12:22

## 2020-01-22 RX ADMIN — INSULIN LISPRO 1 UNITS: 100 INJECTION, SOLUTION INTRAVENOUS; SUBCUTANEOUS at 21:22

## 2020-01-22 RX ADMIN — MIDAZOLAM 2 MG: 1 INJECTION INTRAMUSCULAR; INTRAVENOUS at 09:22

## 2020-01-22 RX ADMIN — HYDROMORPHONE HYDROCHLORIDE 0.5 MG: 1 INJECTION, SOLUTION INTRAMUSCULAR; INTRAVENOUS; SUBCUTANEOUS at 19:04

## 2020-01-22 RX ADMIN — OXYCODONE HYDROCHLORIDE 10 MG: 10 TABLET ORAL at 17:02

## 2020-01-22 RX ADMIN — FENTANYL CITRATE 50 MCG: 50 INJECTION INTRAMUSCULAR; INTRAVENOUS at 11:13

## 2020-01-22 RX ADMIN — TRAZODONE HYDROCHLORIDE 200 MG: 100 TABLET ORAL at 22:16

## 2020-01-22 RX ADMIN — SENNOSIDES AND DOCUSATE SODIUM 1 TABLET: 8.6; 5 TABLET ORAL at 21:18

## 2020-01-22 RX ADMIN — FENTANYL CITRATE 50 MCG: 50 INJECTION, SOLUTION INTRAMUSCULAR; INTRAVENOUS at 13:15

## 2020-01-22 RX ADMIN — SODIUM CHLORIDE: 9 INJECTION, SOLUTION INTRAVENOUS at 09:14

## 2020-01-22 RX ADMIN — FENTANYL CITRATE 50 MCG: 50 INJECTION, SOLUTION INTRAMUSCULAR; INTRAVENOUS at 13:02

## 2020-01-22 RX ADMIN — FAMOTIDINE 20 MG: 10 INJECTION, SOLUTION INTRAVENOUS at 10:39

## 2020-01-22 RX ADMIN — OYSTER SHELL CALCIUM WITH VITAMIN D 1 TABLET: 500; 200 TABLET, FILM COATED ORAL at 21:18

## 2020-01-22 RX ADMIN — ROCURONIUM BROMIDE 5 MG: 10 INJECTION INTRAVENOUS at 11:13

## 2020-01-22 RX ADMIN — HYDROMORPHONE HYDROCHLORIDE 0.25 MG: 1 INJECTION, SOLUTION INTRAMUSCULAR; INTRAVENOUS; SUBCUTANEOUS at 12:44

## 2020-01-22 RX ADMIN — FENTANYL CITRATE 25 MCG: 50 INJECTION INTRAMUSCULAR; INTRAVENOUS at 11:50

## 2020-01-22 RX ADMIN — ATORVASTATIN CALCIUM 40 MG: 40 TABLET, FILM COATED ORAL at 21:05

## 2020-01-22 RX ADMIN — CEFEPIME HYDROCHLORIDE 2 G: 2 INJECTION, POWDER, FOR SOLUTION INTRAVENOUS at 19:03

## 2020-01-22 RX ADMIN — HYDROMORPHONE HYDROCHLORIDE 0.25 MG: 1 INJECTION, SOLUTION INTRAMUSCULAR; INTRAVENOUS; SUBCUTANEOUS at 11:42

## 2020-01-22 RX ADMIN — FENTANYL CITRATE 50 MCG: 50 INJECTION, SOLUTION INTRAMUSCULAR; INTRAVENOUS at 13:09

## 2020-01-22 RX ADMIN — HYDROMORPHONE HYDROCHLORIDE 0.5 MG: 1 INJECTION, SOLUTION INTRAMUSCULAR; INTRAVENOUS; SUBCUTANEOUS at 22:16

## 2020-01-22 RX ADMIN — FLUOXETINE 20 MG: 20 CAPSULE ORAL at 21:18

## 2020-01-22 RX ADMIN — OXYCODONE HYDROCHLORIDE 10 MG: 10 TABLET ORAL at 21:05

## 2020-01-22 RX ADMIN — ONDANSETRON 4 MG: 2 INJECTION INTRAMUSCULAR; INTRAVENOUS at 10:39

## 2020-01-22 RX ADMIN — Medication 1500 MG: at 21:19

## 2020-01-22 RX ADMIN — HYDROMORPHONE HYDROCHLORIDE 0.25 MG: 1 INJECTION, SOLUTION INTRAMUSCULAR; INTRAVENOUS; SUBCUTANEOUS at 12:36

## 2020-01-22 RX ADMIN — THERA TABS 1 TABLET: TAB at 21:18

## 2020-01-22 RX ADMIN — LIDOCAINE HYDROCHLORIDE 60 MG: 20 INJECTION, SOLUTION EPIDURAL; INFILTRATION; INTRACAUDAL; PERINEURAL at 11:13

## 2020-01-22 RX ADMIN — HYDROMORPHONE HYDROCHLORIDE 0.5 MG: 1 INJECTION, SOLUTION INTRAMUSCULAR; INTRAVENOUS; SUBCUTANEOUS at 14:08

## 2020-01-22 RX ADMIN — HYDROMORPHONE HYDROCHLORIDE 0.5 MG: 1 INJECTION, SOLUTION INTRAMUSCULAR; INTRAVENOUS; SUBCUTANEOUS at 15:00

## 2020-01-22 RX ADMIN — SODIUM CHLORIDE, PRESERVATIVE FREE 10 ML: 5 INJECTION INTRAVENOUS at 22:17

## 2020-01-22 RX ADMIN — FENTANYL CITRATE 25 MCG: 50 INJECTION INTRAMUSCULAR; INTRAVENOUS at 11:42

## 2020-01-22 RX ADMIN — DOCUSATE SODIUM 100 MG: 100 CAPSULE, LIQUID FILLED ORAL at 21:05

## 2020-01-22 RX ADMIN — MIDAZOLAM 2 MG: 1 INJECTION INTRAMUSCULAR; INTRAVENOUS at 11:08

## 2020-01-22 RX ADMIN — PROPOFOL 200 MG: 10 INJECTION, EMULSION INTRAVENOUS at 11:13

## 2020-01-22 RX ADMIN — FENTANYL CITRATE 50 MCG: 50 INJECTION, SOLUTION INTRAMUSCULAR; INTRAVENOUS at 13:24

## 2020-01-22 RX ADMIN — CEFAZOLIN SODIUM 2 G: 10 INJECTION, POWDER, FOR SOLUTION INTRAVENOUS at 11:09

## 2020-01-22 ASSESSMENT — PAIN DESCRIPTION - DESCRIPTORS
DESCRIPTORS: ACHING;CONSTANT
DESCRIPTORS: ACHING
DESCRIPTORS: ACHING;CONSTANT
DESCRIPTORS: ACHING
DESCRIPTORS: SHARP
DESCRIPTORS: SHARP
DESCRIPTORS: CONSTANT;SHARP;SHOOTING
DESCRIPTORS: ACHING;CONSTANT
DESCRIPTORS: ACHING
DESCRIPTORS: SHARP

## 2020-01-22 ASSESSMENT — PULMONARY FUNCTION TESTS
PIF_VALUE: 14
PIF_VALUE: 13
PIF_VALUE: 5
PIF_VALUE: 19
PIF_VALUE: 13
PIF_VALUE: 16
PIF_VALUE: 15
PIF_VALUE: 16
PIF_VALUE: 15
PIF_VALUE: 13
PIF_VALUE: 14
PIF_VALUE: 16
PIF_VALUE: 15
PIF_VALUE: 14
PIF_VALUE: 14
PIF_VALUE: 2
PIF_VALUE: 14
PIF_VALUE: 15
PIF_VALUE: 1
PIF_VALUE: 5
PIF_VALUE: 1
PIF_VALUE: 13
PIF_VALUE: 16
PIF_VALUE: 14
PIF_VALUE: 14
PIF_VALUE: 15
PIF_VALUE: 14
PIF_VALUE: 13
PIF_VALUE: 13
PIF_VALUE: 11
PIF_VALUE: 4
PIF_VALUE: 1
PIF_VALUE: 5
PIF_VALUE: 14
PIF_VALUE: 0
PIF_VALUE: 16
PIF_VALUE: 15
PIF_VALUE: 1
PIF_VALUE: 2
PIF_VALUE: 15
PIF_VALUE: 14
PIF_VALUE: 16
PIF_VALUE: 15
PIF_VALUE: 1
PIF_VALUE: 5
PIF_VALUE: 11
PIF_VALUE: 16
PIF_VALUE: 16
PIF_VALUE: 13
PIF_VALUE: 7
PIF_VALUE: 14
PIF_VALUE: 15
PIF_VALUE: 14
PIF_VALUE: 13
PIF_VALUE: 14
PIF_VALUE: 15
PIF_VALUE: 0
PIF_VALUE: 14
PIF_VALUE: 15
PIF_VALUE: 15
PIF_VALUE: 13
PIF_VALUE: 16
PIF_VALUE: 14
PIF_VALUE: 15
PIF_VALUE: 15
PIF_VALUE: 5
PIF_VALUE: 16
PIF_VALUE: 14
PIF_VALUE: 0
PIF_VALUE: 14
PIF_VALUE: 14
PIF_VALUE: 16
PIF_VALUE: 15
PIF_VALUE: 15
PIF_VALUE: 13
PIF_VALUE: 12
PIF_VALUE: 14
PIF_VALUE: 17
PIF_VALUE: 13
PIF_VALUE: 15
PIF_VALUE: 15
PIF_VALUE: 14
PIF_VALUE: 3
PIF_VALUE: 0
PIF_VALUE: 14
PIF_VALUE: 14
PIF_VALUE: 11
PIF_VALUE: 13
PIF_VALUE: 14
PIF_VALUE: 14
PIF_VALUE: 15
PIF_VALUE: 16
PIF_VALUE: 13
PIF_VALUE: 14
PIF_VALUE: 13
PIF_VALUE: 13
PIF_VALUE: 14
PIF_VALUE: 15
PIF_VALUE: 15
PIF_VALUE: 13
PIF_VALUE: 13
PIF_VALUE: 14

## 2020-01-22 ASSESSMENT — ENCOUNTER SYMPTOMS
SHORTNESS OF BREATH: 0
CONSTIPATION: 0
DIARRHEA: 0
NAUSEA: 0
EYE DISCHARGE: 0
BACK PAIN: 0
EYE REDNESS: 0
COUGH: 0
RHINORRHEA: 0
SORE THROAT: 0
TROUBLE SWALLOWING: 0
WHEEZING: 0
ABDOMINAL PAIN: 0

## 2020-01-22 ASSESSMENT — PAIN - FUNCTIONAL ASSESSMENT
PAIN_FUNCTIONAL_ASSESSMENT: PREVENTS OR INTERFERES SOME ACTIVE ACTIVITIES AND ADLS
PAIN_FUNCTIONAL_ASSESSMENT: PREVENTS OR INTERFERES WITH MANY ACTIVE NOT PASSIVE ACTIVITIES
PAIN_FUNCTIONAL_ASSESSMENT: PREVENTS OR INTERFERES WITH MANY ACTIVE NOT PASSIVE ACTIVITIES
PAIN_FUNCTIONAL_ASSESSMENT: 0-10
PAIN_FUNCTIONAL_ASSESSMENT: PREVENTS OR INTERFERES WITH MANY ACTIVE NOT PASSIVE ACTIVITIES
PAIN_FUNCTIONAL_ASSESSMENT: ACTIVITIES ARE NOT PREVENTED
PAIN_FUNCTIONAL_ASSESSMENT: PREVENTS OR INTERFERES SOME ACTIVE ACTIVITIES AND ADLS
PAIN_FUNCTIONAL_ASSESSMENT: ACTIVITIES ARE NOT PREVENTED
PAIN_FUNCTIONAL_ASSESSMENT: PREVENTS OR INTERFERES SOME ACTIVE ACTIVITIES AND ADLS

## 2020-01-22 ASSESSMENT — PAIN DESCRIPTION - PROGRESSION
CLINICAL_PROGRESSION: NOT CHANGED
CLINICAL_PROGRESSION: GRADUALLY IMPROVING
CLINICAL_PROGRESSION: NOT CHANGED
CLINICAL_PROGRESSION: GRADUALLY WORSENING
CLINICAL_PROGRESSION: NOT CHANGED
CLINICAL_PROGRESSION: NOT CHANGED
CLINICAL_PROGRESSION: GRADUALLY WORSENING
CLINICAL_PROGRESSION: NOT CHANGED
CLINICAL_PROGRESSION: NOT CHANGED

## 2020-01-22 ASSESSMENT — PAIN DESCRIPTION - PAIN TYPE
TYPE: ACUTE PAIN;SURGICAL PAIN
TYPE: SURGICAL PAIN
TYPE: ACUTE PAIN;SURGICAL PAIN
TYPE: SURGICAL PAIN
TYPE: SURGICAL PAIN
TYPE: ACUTE PAIN;SURGICAL PAIN
TYPE: SURGICAL PAIN

## 2020-01-22 ASSESSMENT — PAIN DESCRIPTION - ORIENTATION
ORIENTATION: RIGHT

## 2020-01-22 ASSESSMENT — PAIN DESCRIPTION - LOCATION
LOCATION: KNEE

## 2020-01-22 ASSESSMENT — PAIN DESCRIPTION - ONSET
ONSET: ON-GOING

## 2020-01-22 ASSESSMENT — PAIN DESCRIPTION - FREQUENCY
FREQUENCY: CONTINUOUS

## 2020-01-22 ASSESSMENT — PAIN SCALES - GENERAL
PAINLEVEL_OUTOF10: 7
PAINLEVEL_OUTOF10: 8
PAINLEVEL_OUTOF10: 7
PAINLEVEL_OUTOF10: 8
PAINLEVEL_OUTOF10: 8
PAINLEVEL_OUTOF10: 7
PAINLEVEL_OUTOF10: 8
PAINLEVEL_OUTOF10: 7
PAINLEVEL_OUTOF10: 8
PAINLEVEL_OUTOF10: 8
PAINLEVEL_OUTOF10: 7
PAINLEVEL_OUTOF10: 9
PAINLEVEL_OUTOF10: 7
PAINLEVEL_OUTOF10: 8
PAINLEVEL_OUTOF10: 7

## 2020-01-22 ASSESSMENT — PAIN SCALES - WONG BAKER
WONGBAKER_NUMERICALRESPONSE: 0
WONGBAKER_NUMERICALRESPONSE: 0

## 2020-01-22 ASSESSMENT — PAIN DESCRIPTION - DIRECTION: RADIATING_TOWARDS: RIGHT

## 2020-01-22 NOTE — BRIEF OP NOTE
Brief Postoperative Note  ______________________________________________________________    Patient: Jennifer Howard  YOB: 1970  MRN: [id number]  Date of Procedure: 1/22/2020    Pre-Op Diagnosis: Deep Infection right total knee    Post-Op Diagnosis: Same       Procedure(s):  RIGHT KNEE IRRIGATION AND DEBRIDEMENT WITH POLY EXCHANGE    Anesthesia: General    Surgeon(s):  Jose M Marcelino MD    Assistant: Verena Taylor    Estimated Blood Loss (mL): 740     Complications: None    Specimens:   ID Type Source Tests Collected by Time Destination   1 : 1) RIGHT KNEE INTRAARTICULAR FLUID ( STAT GRAM STAIN ALSO) Joint/Joint Fluid Joint, Knee BODY FLUID CELL COUNT WITH DIFFERENTIAL, JOINT CULTURE Jose M Marcelino MD 1/22/2020 1132    2 : 2) SWAB RIGHT KNEE SEROMA Specimen Leg SURGICAL CULTURE Jose M Marcelino MD 1/22/2020 1138    3 : 3) RIGHT KNEE SEROMA Specimen Leg SURGICAL CULTURE Jose M Marcelino MD 1/22/2020 1139    4 : 4) SYNOVIAL LINING RIGHT KNEE #1 Specimen Leg SURGICAL CULTURE Jose M Marcelino MD 1/22/2020 1149    5 : 5) SYNOVIAL LINING RIGHT KNEE #2 Specimen Leg SURGICAL CULTURE Jose M Marcelino MD 1/22/2020 1150    6 : 6) SYNOVIAL LINING RIGHT KNEE #3 Specimen Leg SURGICAL CULTURE Jose M Marcelino MD 1/22/2020 1154        Implants:  Implant Name Type Inv. Item Serial No.  Lot No. LRB No. Used   IMPL KNEE TIB ARTICULAR SZ 5TO6 17MM Knee IMPL KNEE TIB ARTICULAR SZ 5TO6 17MM  [id number] Right 1   IMPL KNEE TIB ARTICULAR SZ 5TO6 17MM Knee IMPL KNEE TIB ARTICULAR SZ 5TO6 17MM  [employer] [id number] Right 1         Drains: * No LDAs found *    Findings: subcutaneous hematoma and gray to white fluid. Intra-articular fluid with milky / straw color. Synovial inflammation. No evidence of loss of implant / cement / bone interface.     Jj Nina MD  Date: 1/22/2020  Time: 12:35 PM

## 2020-01-22 NOTE — PROGRESS NOTES
Pt having pain in right knee, treated with PRNs as ordered. Pain remains rated 8/10, appears to be resting more comfortably.

## 2020-01-22 NOTE — H&P
Cancer [facility])     renal     Carpal tunnel syndrome     Chronic pain     Depression     Depression     GERD (gastroesophageal reflux disease)     Headache(784.0)     History of blood transfusion     History of tobacco use     Quit 7/2014    Migraine     chronic for 1 year    Morbid obesity ([address]     Movement disorder     Onychomycosis     Sleep apnea, obstructive     Severe uses cpap stop bang 6    Unspecified cerebral artery occlusion with cerebral infarction 2014    slight weakness in left arm    Wears dentures     full set    Wears glasses      Past Surgical History:        Procedure Laterality Date    ABDOMEN SURGERY      gastric bypass    ABDOMEN SURGERY  4-7-2016    repair of recurrent incisional hernia with mesh, removal of old mesh, bilateral component separation    ABDOMINAL EXPLORATION SURGERY  1/11/16    exp lap, lysis of adhesions, small bowel resection    CARPAL TUNNEL RELEASE      bilat    DILATATION, ESOPHAGUS      ENDOSCOPY, COLON, DIAGNOSTIC      EYE SURGERY      GASTRIC BYPASS SURGERY  Jan 2009    Has lost about 200 pounds.  HERNIA REPAIR  3-    ventral    JOINT REPLACEMENT      KIDNEY REMOVAL Left 08/01/2018    KNEE ARTHROSCOPY Right 7/11/2013    Dr.Robert Sanders     KNEE ARTHROSCOPY Right 7/11/12    RIGHT KNEE ARTHROSCOPY WITH CHONDROPLASTY    KNEE SURGERY  July 2012    right, arthroscopy    LAPAROTOMY      LASIK  2005    OTHER SURGICAL HISTORY Left 03/08/2016    CT biopsy ablasion left kidney    OTHER SURGICAL HISTORY  2016    small intestine 12 inch removed, 2 hernia surgeries, another surgery to drain infection from incision.      REVISION TOTAL KNEE ARTHROPLASTY Right 12/17/2019    RIGHT REVISION TIBIA TOTAL KNEE REPLACEMENT performed by Aleksey Hernandez MD at [address] [facility]  10/15/13    RIGHT    UPPER GASTROINTESTINAL ENDOSCOPY  6-7-2016    UPPER GASTROINTESTINAL ENDOSCOPY  04/02/2018     Medications Prior REVIEW OF SYSTEMS:  He denies fever or chills. He  denies GI or  symptoms  No cough, CP or SOA  + right knee pain    PHYSICAL EXAM:     VITALS:  /80   Pulse 85   Temp 98.7 °F (37.1 °C) (Temporal)   Resp 18   Ht 6' (1.829 m)   Wt 263 lb (119.3 kg)   SpO2 98%   BMI 35.67 kg/m²     Eyes:  lids and lashes normal, pupils equal, round and reactive to light and extra-ocular muscles intact    Head/ENT:  normocepalic, without obvious abnormality, atraumatic    Neck:  supple, symmetrical, trachea midline and skin normal    Heart:  regular rate and rhythm and normal S1 and S2    Lungs:  no increased work of breathing and good air exchange    Abdomen:  soft, non-distended and non-tender    Extremities:  No clubbing, cyanosis, or edema    Musculoskeletal:  Right knee    There is mild erythema. There is no drainage. There is moderate soft tissue swelling. He is felt to have moderate amount of fluid subcuticularly which is most likely a seroma. He does have some ballottement of the patella consistent with a joint effusion. AROM-  Extension  5          -   Flexion      100   Extensor Mechanism is intact. Calf is not swollen. DATA:  CBC:   Lab Results   Component Value Date    WBC 9.3 01/15/2020    RBC 4.50 01/15/2020    RBC 4.94 01/07/2015    HGB 13.8 01/15/2020    HCT 41.6 01/15/2020    MCV 92.5 01/15/2020    MCH 30.7 01/15/2020    MCHC 33.2 01/15/2020    RDW 14.9 01/15/2020     01/15/2020    MPV 8.9 01/15/2020     BMP:    Lab Results   Component Value Date     01/15/2020    K 4.1 01/15/2020     01/15/2020    CO2 24 01/15/2020    BUN 8 01/15/2020    LABALBU 4.4 12/03/2019    CREATININE 1.0 01/15/2020    CALCIUM 9.2 01/15/2020    GFRAA >60 01/15/2020    LABGLOM >60 01/15/2020    GLUCOSE 102 01/15/2020    GLUCOSE 84 01/07/2015       ASSESSMENT AND PLAN:    1.   Patient is a 51 y.o. male with above specified procedure planned right knee I&D with possible polyethylene exchange with

## 2020-01-22 NOTE — PROGRESS NOTES
1251 pt arrived from OR, vitals stable, on 6L NC. Right leg dressing clean, dry, and intact. 2+ right pedal pulse, no edema visualized. Full movement and sensation in RLE.

## 2020-01-22 NOTE — DISCHARGE INSTR - COC
No  Continence:   · Bowel: Yes  · Bladder: Yes  Date of Last BM: 1/21/20    Intake/Output Summary (Last 24 hours)     Intake/Output Summary (Last 24 hours) at 1/22/2020 1730  Last data filed at 1/22/2020 1626  Gross per 24 hour   Intake 1100 ml   Output 750 ml   Net 350 ml     Safety Concerns: At Risk for Falls    Impairments/Disabilities:      Vision    Nutrition Therapy:  Current Nutrition Therapy: DIET GENERAL;  Routes of Feeding: Oral  Liquids: No Restrictions  Daily Fluid Restriction: no  Last Modified Barium Swallow with Video (Video Swallowing Test): not done    Treatments at the Time of Hospital Discharge:   Respiratory Treatments:   Oxygen Therapy:  is not on home oxygen therapy. Ventilator:    - BiPAP    ,   only when sleeping and device from home    Lab orders for discharge:        Rehab Therapies: Physical Therapy, Occupational Therapy and nursing care  Weight Bearing Status/Restrictions: Full weight bearing as tolerated with immobilizer on when out of bed  Other Medical Equipment (for information only, NOT a DME order):  Rolling walker  Other Treatments: ASA 81mg twice at day for 14 days for DVT prophylaxis, bilateral MIAH hose for 6 weeks after surgery    Patient's personal belongings (please select all that are sent with patient):  Glasses, Dentures upper and lower    RN SIGNATURE:  Electronically signed by Radha Jean-Baptiste RN on 1/22/20 at 5:30 PM    PHYSICIAN SECTION    Prognosis: Good    Condition at Discharge: Stable    Rehab Potential (if transferring to Rehab): Good    Physician Certification: I certify the above orders, information, and transfer of Ansley Donaldson is necessary for the continuing treatment of the diagnosis listed and that he requires [address] for less 30 days.      Update Admission H&P: No change in H&P    PHYSICIAN SIGNATURE:  Electronically signed by LILLIE Krause CNP on 1/24/20 at 2:59 PM/ [facility], [address] [facility], [address] [facility],   [phone]    DISCHARGE INSTRUCTIONS FOR TOTAL KNEE REPLACEMENT  Activity:   Elevate your leg if swelling occurs in your ankle. Use elastic wraps/hose until swelling decreases.  Continue the exercise program as prescribed by physical therapists.  Take frequent walks.  Use walker, crutches, or cane with weight bearing instructions as indicated by the physical therapists.  Take rest periods often. Elevate leg during rest period. Wound Care:   Cover the wound with a sterile gauze dressing and change daily as long as there is drainage.  Do not scrub wound. Pat it dry with a soft towel.  Dont apply any lotions or creams to your wound.  Check the incision every day for redness, swelling, or increase in drainage. Diet:   You can resume your normal diet. There are no limits on your diet due to your surgery.  Pain pills and activity changes may lead to constipation. To prevent this, use prune juice or bran cereals liberally. You may need to use a laxative such as Dulcolax, Senokot, or Milk of Magnesia.  Drink plenty of fluids. Medications:   Take pain pills if needed to maintain comfort.  Never drive while taking pain medicine.  Avoid over the counter medications until checking with your doctor.  Resume previous medications as instructed by your doctor. You will be on aspirin or Eliquis  for 14 days only. Stay off other anti-inflammatory medications (except Celebrex)  Call Your Doctor If:  [facility] You have increased pain not controlled by medications.  Excessive swelling in your ankle.  You develop numbness, tingling, or decreased movement.  You have a fever greater than 100 degrees for a day or over 101 degrees at any one time.  Your wound becomes more reddened, starts draining, or opens.  If you fall. You have any questions about your recovery.   Inform your family doctor/dentist or any other doctor who cares for you in the

## 2020-01-22 NOTE — CONSULTS
Infectious Diseases   Consult Note        Admission Date: 1/22/2020  Hospital Day: Hospital Day: 1   Attending: Michelle Kerr MD  Date of service: 1/22/20     Reason for admission: Infection of total knee replacement, initial encounter [facility]) [[address], [address]    Chief complaint/ Reason for consult: Right knee prosthetic joint infection    Microbiology:        I have reviewed allavailable micro lab data and cultures    · Right knee surgical culture  - collected on 1/22/2020: In process      Antibiotics and immunizations:       Current antibiotics: All antibiotics and their doses were reviewed by me    Recent Abx Admin                   vancomycin (VANCOCIN) injection (g) 2 g Given 01/22/20 1216    sodium chloride 0.9 % 3,000 mL with bacitracin 50,000 Units ()  Given 01/22/20 1211    ceFAZolin (ANCEF) 2 g in dextrose 5 % 100 mL IVPB (g) 2 g Given 01/22/20 1109                  Immunization History: All immunization history was reviewed by me today.     Immunization History   Administered Date(s) Administered    HIB PRP-T (ActHIB, Hiberix) 09/13/2010    Hib, unspecified 02/09/2015    Influenza Virus Vaccine 02/03/2018    Influenza Whole 10/04/2012    Influenza, Intradermal, Preservative free 10/28/2014    Influenza, Intradermal, Quadrivalent, Preservative Free 12/13/2016    Influenza, Quadv, IM, PF (6 mo and older Fluzone, Flulaval, Fluarix, and 3 yrs and older Afluria) 12/18/2019    Influenza, Quadv, Recombinant, IM PF (Flublok 18 yrs and older) 10/15/2018    Meningococcal ACWY Vaccine 01/15/2009    Meningococcal MCV4O (Menveo) 08/10/2019, 10/30/2019    Meningococcal MCV4P (Menactra) 02/09/2015, 08/17/2019, 10/30/2019    Meningococcal MPSV4 (Menomune) 01/15/2009    Pneumococcal Conjugate 13-valent ([id number]) 02/09/2015    Pneumococcal Polysaccharide ([id number]) 01/15/2009, 10/09/2013, 08/09/2019    Td, unspecified formulation 01/01/2007    Tdap (Boostrix, Adacel) 01/01/2007, 05/03/2014 kg/m².  · History of alcoholism      Discussion:      The patient underwent a surgical I&D of the right knee today with poly-exchange. Brief note reviewed. Apparently was no evidence of her loss of implant or cement. Subcutaneous hematoma with grayish-white fluid was noted and milky colored fluid was noted in the joint with synovial inflammation. The Gram stain of the synovial fluid shows 3+ WBCs. Cultures have been sent. Plan:     Diagnostic Workup:    · Will follow-up on surgical cultures  · Will order baseline sed rate and CRP  · Continue to follow fever curve, WBC count and blood cultures  · Follow up on liverand renal functions closely  · Check CBC tomorrow  · Check daily BMP from tomorrow    Antimicrobials:    · Will start IV vancomycin  Check Vancomycin trough before the 5th dose. Target vancomycin trough of around 15. Keep vancomycin trough below 20 at all times. Avoid increasing the dose of vancomycin above a total of 4 grams in a 24-hour period in patients younger than 45 years and above 3 grams in a 24-hour period in a patient of age 39 years or older. Continue to monitor serum creatinine and Vanco levels closely, while the patient is on I/v Vancomycin. · We will continue IV cefepime 2 g every 12 hour  · Continue to monitor his vitals closely  · We will follow-up on the culture results and clinical progress and will make further recommendations accordingly  · Pain control  · Surgical site care  · Fall precautions  · DVT prophylaxis  · Discussed the above plan with patient and RN       Drug Monitoring:    · Continue serial monitoring for antibiotic toxicity as follows: Vanco trough, CMP  · Continue to watch for following: new or worsening fever, hypotension, hives, lip swelling and redness or purulence at vascular access sites. I/v access Management:    · Continue to monitor i.v access sites for erythema, induration, discharge or tenderness.    · As always, continue efforts to minimizetubes/lines/drains as clinically appropriate to reduce chances of line associated infections. Current isolation precautions: There are no current isolations documented for this patient. Patient education and counseling:     · The patient was educated in detailabout the side-effects of various antibiotics and things to watch for like new rashes, lip swelling, severe reaction, worsening diarrhea, break through fever etc.  · Discussed patient'scondition and what to expect. All of the patient's questions were addressed in a satisfactory manner and patient verbalized understanding all instructions. Level of complexity of visit: High     Risk of Complications/Morbidity: High     · Illness(es)/ Infection present that pose threat to bodily function. · There is potential for severe exacerbation of infection/side effects of treatment. · Therapy requires intensive monitoring for antimicrobial agent toxicity. Thank you for involving me in the care of your patient. I will continue to follow. If you have any additional questions, please do not hesitate to contact me. Subjective:     Presenting complaint in ER:     No chief complaint on file. HPI: Dottie Rai is a 51 y.o. male patient, who was seen at the request of Dr. Heinz Najjar, MD.    History was obtained from chart review and the patient. The patient was admitted on 1/22/2020. I have been consulted to see the patient for above mentioned reason(s). The patient has multiple medical comorbidities, and was admitted to the hospital due to concern for possible right knee prosthetic joint infection. The patient had undergone a revision right total knee arthroplasty surgery on December 17, 2019 for medial lateral instability. He underwent a poly-exchange. The patient was bathing with his daughter about a week ago and noticed drainage from the superior aspect of the surgical wound.   The patient was sent to the ER and apparently was discharged from there. He resumed his physical therapy on Friday and felt it was becoming more painful. He also started having increasing pain and swelling in the right knee area. The patient was seen by Dr. Jose Rodriguez and was thought to have a seroma but infection could not be ruled out. He was started on oral Keflex 500 mg 4 times daily. The patient was [facility] to the [facility] today and underwent surgical I&D of the right knee. I have been asked to see the patient for further evaluation and antibiotic management                   Past Medical History: All past medical history reviewed today. Past Medical History:   Diagnosis Date    Alcoholism [facility])     last drink 2015    Allergic migraine with status migrainosus     Allergic rhinitis, seasonal     Anemia     Arthritis     right knee    Vaughn's esophagus     Benign intracranial hypertension     Cancer (HCC)     renal     Carpal tunnel syndrome     Chronic pain     Depression     Depression     GERD (gastroesophageal reflux disease)     Headache(784.0)     History of blood transfusion     History of tobacco use     Quit 7/2014    Migraine     chronic for 1 year    Morbid obesity ([address]     Movement disorder     Onychomycosis     Sleep apnea, obstructive     Severe uses cpap stop bang 6    Unspecified cerebral artery occlusion with cerebral infarction 2014    slight weakness in left arm    Wears dentures     full set    Wears glasses          Past Surgical History: All pastsurgical history was reviewed today.     Past Surgical History:   Procedure Laterality Date    ABDOMEN SURGERY      gastric bypass    ABDOMEN SURGERY  4-7-2016    repair of recurrent incisional hernia with mesh, removal of old mesh, bilateral component separation    ABDOMINAL EXPLORATION SURGERY  1/11/16    exp lap, lysis of adhesions, small bowel resection    CARPAL TUNNEL RELEASE      bilat    DILATATION, ESOPHAGUS      ENDOSCOPY, COLON, DIAGNOSTIC      EYE SURGERY      GASTRIC BYPASS SURGERY  Jan 2009    Has lost about 200 pounds.  HERNIA REPAIR  3-    ventral    JOINT REPLACEMENT      KIDNEY REMOVAL Left 08/01/2018    KNEE ARTHROSCOPY Right 7/11/2013    Dr.Robert Sanders     KNEE ARTHROSCOPY Right 7/11/12    RIGHT KNEE ARTHROSCOPY WITH CHONDROPLASTY    KNEE SURGERY  July 2012    right, arthroscopy    LAPAROTOMY      LASIK  2005    OTHER SURGICAL HISTORY Left 03/08/2016    CT biopsy ablasion left kidney    OTHER SURGICAL HISTORY  2016    small intestine 12 inch removed, 2 hernia surgeries, another surgery to drain infection from incision.  REVISION TOTAL KNEE ARTHROPLASTY Right 12/17/2019    RIGHT REVISION TIBIA TOTAL KNEE REPLACEMENT performed by Jd Hurt MD at [address] [facility]  10/15/13    RIGHT    UPPER GASTROINTESTINAL ENDOSCOPY  6-7-2016    UPPER GASTROINTESTINAL ENDOSCOPY  04/02/2018         Family History: All family history was reviewed today. Problem Relation Age of Onset    Cancer Father         Lymphoma    Arthritis Mother     Dementia Other     Diabetes Other     Cancer Other     Diabetes Maternal Uncle     Heart Disease Maternal Uncle     Depression Other     Heart Disease Maternal Uncle          Medications: All current and past medications were reviewed. Medications Prior to Admission: oxyCODONE (ROXICODONE) 5 MG immediate release tablet, Take 1-2 tablets by mouth every 4 hours as needed for Pain for up to 7 days. cephALEXin (KEFLEX) 500 MG capsule, Take 1 capsule by mouth 4 times daily for 7 days  zolpidem (AMBIEN) 10 MG tablet, Take 10 mg by mouth nightly as needed. dicyclomine (BENTYL) 20 MG tablet, Take 40 mg by mouth 2 times daily as needed  aspirin EC 81 MG EC tablet, Take 1 tablet by mouth 2 times daily for 14 days Please avoid missing doses.   atorvastatin (LIPITOR) 40 MG tablet, TAKE 1 TABLET BY MOUTH EVERY NIGHT AT General: Tenderness present. No swelling. Comments: Surgical dressing noted on the right knee   Lymphadenopathy:      Cervical: No cervical adenopathy. Skin:     General: Skin is warm and dry. Coloration: Skin is not jaundiced. Findings: No erythema or rash. Neurological:      General: No focal deficit present. Mental Status: He is alert and oriented to person, place, and time. Mental status is at baseline. Motor: No abnormal muscle tone. Psychiatric:         Mood and Affect: Mood normal.         Behavior: Behavior normal.         Thought Content: Thought content normal.           Lines: All vascular access sites are healthy with no local erythema, discharge or tenderness. Intake and output:     I/O last 3 completed shifts: In: 800 [I.V.:800]  Out: 48 [Blood:50]    Lab Data:   All available labs were reviewed by me today. CBC: No results for input(s): WBC, RBC, HGB, HCT, PLT, MCV, MCH, MCHC, RDW, NRBC, SEGSPCT, BANDSPCT in the last 72 hours. BMP:  No results for input(s): NA, K, CL, CO2, BUN, CREATININE, CALCIUM, GLUCOSE in the last 72 hours. Hepatic FunctionPanel:   Lab Results   Component Value Date    ALKPHOS 93 08/31/2019    ALT 12 08/31/2019    AST 12 08/31/2019    PROT 6.6 08/31/2019    PROT 6.7 01/07/2015    BILITOT 0.3 08/31/2019    BILIDIR <0.2 02/18/2015    IBILI 0.3 02/18/2015    LABALBU 4.4 12/03/2019       CPK:   Lab Results   Component Value Date    CKTOTAL 65 06/18/2014     ESR:   Lab Results   Component Value Date    SEDRATE 4 12/03/2019     CRP:   Lab Results   Component Value Date    CRP 1.6 05/22/2014         Imaging: All pertinent images and reports for the current visit were reviewed by meduring this visit. No orders to display       Outside records:    Labs, Microbiology, Radiology and pertinent results from Care everywhere, if available, were reviewed as a part ofthe consultation.       Problem list:       Patient Active Problem List

## 2020-01-22 NOTE — ANESTHESIA POSTPROCEDURE EVALUATION
Department of Anesthesiology  Postprocedure Note    Patient: Robyn Romano  MRN: [id number]  YOB: 1970  Date of evaluation: 1/22/2020  Time:  4:22 PM     Procedure Summary     Date:  01/22/20 Room / Location:  [address]    Anesthesia Start:  1109 Anesthesia Stop:  2334    Procedure:  RIGHT KNEE IRRIGATION AND DEBRIDEMENT WITH POLY EXCHANGE (Right ) Diagnosis:  (FAILED TOTAL KNEE, RIGHT)    Surgeon:  Shelly King MD Responsible Provider:  Brandi Vale MD    Anesthesia Type:  general ASA Status:  3          Anesthesia Type: general    Cande Phase I: Cande Score: 8    Cande Phase II:      Last vitals: Reviewed and per EMR flowsheets.        Anesthesia Post Evaluation    Patient location during evaluation: PACU  Patient participation: complete - patient participated  Level of consciousness: awake and alert  Pain score: 0  Airway patency: patent  Nausea & Vomiting: no nausea and no vomiting  Complications: no  Cardiovascular status: blood pressure returned to baseline  Respiratory status: acceptable  Hydration status: euvolemic

## 2020-01-22 NOTE — PROGRESS NOTES
Educated patient on purpose of 4 eyes skin assessment and asked patient if allowed to perform, patient verbalized understanding and agreed to assessment. 4 Eyes Skin Assessment     The patient is being assess for  Post-Op Surgical    I agree that 2 RN's have performed a thorough Head to Toe Skin Assessment on the patient. ALL assessment sites listed below have been assessed. Areas assessed by both nurses: Marlyn Chamberlain and salazar  [x]   Head, Face, and Ears   [x]   Shoulders, Back, and Chest  [x]   Arms, Elbows, and Hands   [x]   Coccyx, Sacrum, and IschIum  [x]   Legs, Feet, and Heels        Does the Patient have Skin Breakdown?   No, surgical incision right knee only        Andrei Prevention initiated:  Yes   Wound Care Orders initiated:  NA      WOC nurse consulted for Pressure Injury (Stage 3,4, Unstageable, DTI, NWPT, and Complex wounds), New and Established Ostomies:  NA      Nurse 1 eSignature: Electronically signed by Monty Recinos RN on 1/22/20 at 5:20 PM    **SHARE this note so that the co-signing nurse is able to place an eSignature**    Nurse 2 eSignature: Electronically signed by Lakshmi Eduardo RN on 1/22/20 at 5:33 PM

## 2020-01-22 NOTE — ANESTHESIA PRE PROCEDURE
 Multiple Vitamin TABS Take 1 tablet by mouth daily       Meningococcal B Vac, Recomb, JT 0.5 ml IM x1 (repeat at 1-2 months and 6  Months) 0.5 mL 2    sildenafil (REVATIO) 20 MG tablet Take 4 tablets by mouth as needed (30 min prior to intercourse) 30 tablet 5     Current Facility-Administered Medications   Medication Dose Route Frequency Provider Last Rate Last Dose    ceFAZolin (ANCEF) 2 g in dextrose 5 % 100 mL IVPB  2 g Intravenous Once Lars Dunn MD        0.9 % sodium chloride infusion   Intravenous Continuous Glenn Davis MD        sodium chloride flush 0.9 % injection 10 mL  10 mL Intravenous 2 times per day Glenn Davis MD        sodium chloride flush 0.9 % injection 10 mL  10 mL Intravenous PRN Glenn Davis MD        midazolam (VERSED) injection 2 mg  2 mg Intravenous Once Tripp Gary MD         Vital Signs (Current)   Vitals:    20 1738 20   BP:  129/80   Pulse:  85   Resp:  18   Temp:  98.7 °F (37.1 °C)   TempSrc:  Temporal   SpO2:  98%   Weight: 263 lb (119.3 kg) 263 lb (119.3 kg)   Height: 6' (1.829 m) 6' (1.829 m)                                          BP Readings from Last 3 Encounters:   20 129/80   01/15/20 127/66   20 112/65     Vital Signs Statistics (for past 48 hrs)     Temp  Av.7 °F (37.1 °C)  Min: 98.7 °F (37.1 °C)   Min taken time: 20  Max: 98.7 °F (37.1 °C)   Max taken time: 20  Pulse  Av  Min: 80   Min taken time: 20  Max: 80   Max taken time: 20  Resp  Av  Min: 25   Min taken time: 20  Max: 18   Max taken time: 20  BP  Min: 129/80   Min taken time: 20  Max: 129/80   Max taken time: 20  SpO2  Av %  Min: 98 %   Min taken time: 01/22/20 0849  Max: 98 %   Max taken time: 20 0849  BP Readings from Last 3 Encounters:   20 129/80   01/15/20 127/66   20 112/65       BMI  Body mass index is 35.67 kg/m². Estimated body mass index is 35.67 kg/m² as calculated from the following:    Height as of this encounter: 6' (1.829 m). Weight as of this encounter: 263 lb (119.3 kg). CBC   Lab Results   Component Value Date    WBC 9.3 01/15/2020    RBC 4.50 01/15/2020    RBC 4.94 01/07/2015    HGB 13.8 01/15/2020    HCT 41.6 01/15/2020    MCV 92.5 01/15/2020    RDW 14.9 01/15/2020     01/15/2020     CMP    Lab Results   Component Value Date     01/15/2020    K 4.1 01/15/2020     01/15/2020    CO2 24 01/15/2020    BUN 8 01/15/2020    CREATININE 1.0 01/15/2020    GFRAA >60 01/15/2020    AGRATIO 1.8 08/31/2019    LABGLOM >60 01/15/2020    GLUCOSE 102 01/15/2020    GLUCOSE 84 01/07/2015    PROT 6.6 08/31/2019    PROT 6.7 01/07/2015    CALCIUM 9.2 01/15/2020    BILITOT 0.3 08/31/2019    ALKPHOS 93 08/31/2019    AST 12 08/31/2019    ALT 12 08/31/2019     BMP    Lab Results   Component Value Date     01/15/2020    K 4.1 01/15/2020     01/15/2020    CO2 24 01/15/2020    BUN 8 01/15/2020    CREATININE 1.0 01/15/2020    CALCIUM 9.2 01/15/2020    GFRAA >60 01/15/2020    LABGLOM >60 01/15/2020    GLUCOSE 102 01/15/2020    GLUCOSE 84 01/07/2015     POCGlucose  No results for input(s): GLUCOSE in the last 72 hours.    Coags    Lab Results   Component Value Date    PROTIME 12.8 12/03/2019    INR 1.10 12/03/2019    APTT 40.9 06/82/3063     HCG (If Applicable) No results found for: PREGTESTUR, PREGSERUM, HCG, HCGQUANT   ABGs No results found for: PHART, PO2ART, BPQ0LAR, RUD3UQS, BEART, J5TZFLIC   Type & Screen (If Applicable)  No results found for: LABABO, LABRH                         BMI: Wt Readings from Last 3 Encounters:       NPO Status:   Date of last liquid consumption: 01/21/20   Time of last liquid consumption: 2100   Date of last solid food consumption: 01/21/20      Time of last solid consumption: 2100       Anesthesia Evaluation  Patient summary reviewed no history of anesthetic complications:   Airway: Mallampati: II  TM distance: >3 FB   Neck ROM: full  Mouth opening: > = 3 FB Dental:    (+) edentulous      Pulmonary: breath sounds clear to auscultation  (+) sleep apnea:      (-) COPD and asthma                           Cardiovascular:  Exercise tolerance: good (>4 METS),       (-) hypertension, past MI and CABG/stent      Rhythm: regular  Rate: normal                    Neuro/Psych:   (+) CVA: residual symptoms, headaches:, psychiatric history:   (-) seizures and TIA           GI/Hepatic/Renal:   (+) GERD:, morbid obesity     (-) liver disease and no renal disease       Endo/Other:        (-) diabetes mellitus, hypothyroidism               Abdominal:           Vascular:     - DVT and PE. Anesthesia Plan      general     ASA 3       Induction: intravenous. MIPS: Postoperative opioids intended and Prophylactic antiemetics administered. Anesthetic plan and risks discussed with patient. Plan discussed with CRNA. This pre-anesthesia assessment may be used as a history and physical.    DOS STAFF ADDENDUM:    Pt seen and examined, chart reviewed (including anesthesia, drug and allergy history). No interval changes to history and physical examination. Anesthetic plan, risks, benefits, alternatives, and personnel involved discussed with patient. Patient verbalized an understanding and agrees to proceed.       Tripp Gary MD  January 22, 2020  9:03 AM

## 2020-01-22 NOTE — PROGRESS NOTES
Pt arrived to floor from PACU at 1700 via stretcher. Pt oriented to room, call light, policies and procedures, the menu and ordering. Call light within reach. Bed in lowest position, bed alarm on, and wheels locked. Pt verbalized understanding. No complaints, questions or concerns at this time.   Electronically signed by Lakshmi Eduardo RN on 1/22/2020 at 5:34 PM

## 2020-01-23 LAB
ANION GAP SERPL CALCULATED.3IONS-SCNC: 12 MMOL/L (ref 3–16)
APPEARANCE FLUID: NORMAL
BUN BLDV-MCNC: 8 MG/DL (ref 7–20)
CALCIUM SERPL-MCNC: 8.8 MG/DL (ref 8.3–10.6)
CELL COUNT FLUID TYPE: NORMAL
CHLORIDE BLD-SCNC: 102 MMOL/L (ref 99–110)
CLOT EVALUATION: NORMAL
CO2: 24 MMOL/L (ref 21–32)
COLOR FLUID: YELLOW
CREAT SERPL-MCNC: 0.9 MG/DL (ref 0.9–1.3)
GFR AFRICAN AMERICAN: >60
GFR NON-AFRICAN AMERICAN: >60
GLUCOSE BLD-MCNC: 110 MG/DL (ref 70–99)
GLUCOSE BLD-MCNC: 112 MG/DL (ref 70–99)
GLUCOSE BLD-MCNC: 113 MG/DL (ref 70–99)
GLUCOSE BLD-MCNC: 127 MG/DL (ref 70–99)
GLUCOSE BLD-MCNC: 131 MG/DL (ref 70–99)
HCT VFR BLD CALC: 37.9 % (ref 40.5–52.5)
HEMOGLOBIN: 12.2 G/DL (ref 13.5–17.5)
LYMPHOCYTES, BODY FLUID: 5 %
MCH RBC QN AUTO: 30.1 PG (ref 26–34)
MCHC RBC AUTO-ENTMCNC: 32.3 G/DL (ref 31–36)
MCV RBC AUTO: 93.1 FL (ref 80–100)
NEUTROPHIL, FLUID: 95 %
NUCLEATED CELLS FLUID: 6498 /CUMM
NUMBER OF CELLS COUNTED FLUID: 100
PDW BLD-RTO: 14.2 % (ref 12.4–15.4)
PERFORMED ON: ABNORMAL
PLATELET # BLD: 325 K/UL (ref 135–450)
PMV BLD AUTO: 8.9 FL (ref 5–10.5)
POTASSIUM SERPL-SCNC: 4.2 MMOL/L (ref 3.5–5.1)
RBC # BLD: 4.07 M/UL (ref 4.2–5.9)
RBC FLUID: 128 /CUMM
SODIUM BLD-SCNC: 138 MMOL/L (ref 136–145)
VANCOMYCIN TROUGH: 14.9 UG/ML (ref 10–20)
WBC # BLD: 12.6 K/UL (ref 4–11)

## 2020-01-23 PROCEDURE — 6370000000 HC RX 637 (ALT 250 FOR IP): Performed by: NURSE PRACTITIONER

## 2020-01-23 PROCEDURE — 97116 GAIT TRAINING THERAPY: CPT

## 2020-01-23 PROCEDURE — 6370000000 HC RX 637 (ALT 250 FOR IP): Performed by: ORTHOPAEDIC SURGERY

## 2020-01-23 PROCEDURE — 6360000002 HC RX W HCPCS: Performed by: INTERNAL MEDICINE

## 2020-01-23 PROCEDURE — 85027 COMPLETE CBC AUTOMATED: CPT

## 2020-01-23 PROCEDURE — 99233 SBSQ HOSP IP/OBS HIGH 50: CPT | Performed by: INTERNAL MEDICINE

## 2020-01-23 PROCEDURE — 97530 THERAPEUTIC ACTIVITIES: CPT

## 2020-01-23 PROCEDURE — 2580000003 HC RX 258: Performed by: ORTHOPAEDIC SURGERY

## 2020-01-23 PROCEDURE — 80048 BASIC METABOLIC PNL TOTAL CA: CPT

## 2020-01-23 PROCEDURE — 6360000002 HC RX W HCPCS: Performed by: ORTHOPAEDIC SURGERY

## 2020-01-23 PROCEDURE — 97165 OT EVAL LOW COMPLEX 30 MIN: CPT

## 2020-01-23 PROCEDURE — 36415 COLL VENOUS BLD VENIPUNCTURE: CPT

## 2020-01-23 PROCEDURE — 97535 SELF CARE MNGMENT TRAINING: CPT

## 2020-01-23 PROCEDURE — 2580000003 HC RX 258: Performed by: INTERNAL MEDICINE

## 2020-01-23 PROCEDURE — 97162 PT EVAL MOD COMPLEX 30 MIN: CPT

## 2020-01-23 PROCEDURE — 1200000000 HC SEMI PRIVATE

## 2020-01-23 PROCEDURE — 6360000002 HC RX W HCPCS: Performed by: NURSE PRACTITIONER

## 2020-01-23 PROCEDURE — 80202 ASSAY OF VANCOMYCIN: CPT

## 2020-01-23 PROCEDURE — 94660 CPAP INITIATION&MGMT: CPT

## 2020-01-23 RX ORDER — KETOROLAC TROMETHAMINE 15 MG/ML
15 INJECTION, SOLUTION INTRAMUSCULAR; INTRAVENOUS ONCE
Status: COMPLETED | OUTPATIENT
Start: 2020-01-23 | End: 2020-01-23

## 2020-01-23 RX ORDER — PANTOPRAZOLE SODIUM 40 MG/1
40 TABLET, DELAYED RELEASE ORAL
Status: DISCONTINUED | OUTPATIENT
Start: 2020-01-23 | End: 2020-01-25 | Stop reason: HOSPADM

## 2020-01-23 RX ADMIN — ENOXAPARIN SODIUM 40 MG: 40 INJECTION SUBCUTANEOUS at 07:52

## 2020-01-23 RX ADMIN — HYDROMORPHONE HYDROCHLORIDE 0.5 MG: 1 INJECTION, SOLUTION INTRAMUSCULAR; INTRAVENOUS; SUBCUTANEOUS at 17:03

## 2020-01-23 RX ADMIN — PANTOPRAZOLE SODIUM 40 MG: 40 TABLET, DELAYED RELEASE ORAL at 17:03

## 2020-01-23 RX ADMIN — SODIUM CHLORIDE: 9 INJECTION, SOLUTION INTRAVENOUS at 17:06

## 2020-01-23 RX ADMIN — ATORVASTATIN CALCIUM 40 MG: 40 TABLET, FILM COATED ORAL at 20:07

## 2020-01-23 RX ADMIN — ZOLPIDEM TARTRATE 10 MG: 5 TABLET ORAL at 21:01

## 2020-01-23 RX ADMIN — OXYCODONE HYDROCHLORIDE 10 MG: 10 TABLET ORAL at 23:17

## 2020-01-23 RX ADMIN — OXYCODONE HYDROCHLORIDE 10 MG: 10 TABLET ORAL at 15:12

## 2020-01-23 RX ADMIN — ACETAMINOPHEN 650 MG: 325 TABLET ORAL at 05:34

## 2020-01-23 RX ADMIN — THERA TABS 1 TABLET: TAB at 07:52

## 2020-01-23 RX ADMIN — Medication 1500 MG: at 09:40

## 2020-01-23 RX ADMIN — CEFEPIME HYDROCHLORIDE 2 G: 2 INJECTION, POWDER, FOR SOLUTION INTRAVENOUS at 06:30

## 2020-01-23 RX ADMIN — CEFEPIME HYDROCHLORIDE 2 G: 2 INJECTION, POWDER, FOR SOLUTION INTRAVENOUS at 18:22

## 2020-01-23 RX ADMIN — OXYCODONE HYDROCHLORIDE 10 MG: 10 TABLET ORAL at 09:38

## 2020-01-23 RX ADMIN — SODIUM CHLORIDE, PRESERVATIVE FREE 10 ML: 5 INJECTION INTRAVENOUS at 20:08

## 2020-01-23 RX ADMIN — PANTOPRAZOLE SODIUM 40 MG: 40 TABLET, DELAYED RELEASE ORAL at 07:52

## 2020-01-23 RX ADMIN — SENNOSIDES AND DOCUSATE SODIUM 1 TABLET: 8.6; 5 TABLET ORAL at 07:51

## 2020-01-23 RX ADMIN — HYDROMORPHONE HYDROCHLORIDE 0.5 MG: 1 INJECTION, SOLUTION INTRAMUSCULAR; INTRAVENOUS; SUBCUTANEOUS at 10:57

## 2020-01-23 RX ADMIN — Medication 1500 MG: at 21:00

## 2020-01-23 RX ADMIN — HYDROMORPHONE HYDROCHLORIDE 0.25 MG: 1 INJECTION, SOLUTION INTRAMUSCULAR; INTRAVENOUS; SUBCUTANEOUS at 02:53

## 2020-01-23 RX ADMIN — HYDROMORPHONE HYDROCHLORIDE 0.5 MG: 1 INJECTION, SOLUTION INTRAMUSCULAR; INTRAVENOUS; SUBCUTANEOUS at 20:07

## 2020-01-23 RX ADMIN — OYSTER SHELL CALCIUM WITH VITAMIN D 1 TABLET: 500; 200 TABLET, FILM COATED ORAL at 20:07

## 2020-01-23 RX ADMIN — ACETAMINOPHEN 650 MG: 325 TABLET ORAL at 22:51

## 2020-01-23 RX ADMIN — SENNOSIDES AND DOCUSATE SODIUM 1 TABLET: 8.6; 5 TABLET ORAL at 20:07

## 2020-01-23 RX ADMIN — FLUOXETINE 20 MG: 20 CAPSULE ORAL at 07:52

## 2020-01-23 RX ADMIN — HYDROMORPHONE HYDROCHLORIDE 0.5 MG: 1 INJECTION, SOLUTION INTRAMUSCULAR; INTRAVENOUS; SUBCUTANEOUS at 13:57

## 2020-01-23 RX ADMIN — HYDROMORPHONE HYDROCHLORIDE 0.5 MG: 1 INJECTION, SOLUTION INTRAMUSCULAR; INTRAVENOUS; SUBCUTANEOUS at 07:47

## 2020-01-23 RX ADMIN — DOCUSATE SODIUM 100 MG: 100 CAPSULE, LIQUID FILLED ORAL at 07:51

## 2020-01-23 RX ADMIN — KETOROLAC TROMETHAMINE 15 MG: 15 INJECTION, SOLUTION INTRAMUSCULAR; INTRAVENOUS at 18:22

## 2020-01-23 RX ADMIN — DOCUSATE SODIUM 100 MG: 100 CAPSULE, LIQUID FILLED ORAL at 20:07

## 2020-01-23 RX ADMIN — OXYCODONE HYDROCHLORIDE 10 MG: 10 TABLET ORAL at 19:17

## 2020-01-23 RX ADMIN — OYSTER SHELL CALCIUM WITH VITAMIN D 1 TABLET: 500; 200 TABLET, FILM COATED ORAL at 07:53

## 2020-01-23 RX ADMIN — OXYCODONE HYDROCHLORIDE 10 MG: 10 TABLET ORAL at 01:33

## 2020-01-23 RX ADMIN — ACETAMINOPHEN 650 MG: 325 TABLET ORAL at 11:27

## 2020-01-23 RX ADMIN — OXYCODONE HYDROCHLORIDE 10 MG: 10 TABLET ORAL at 05:34

## 2020-01-23 RX ADMIN — ACETAMINOPHEN 650 MG: 325 TABLET ORAL at 17:03

## 2020-01-23 RX ADMIN — TRAZODONE HYDROCHLORIDE 200 MG: 100 TABLET ORAL at 20:07

## 2020-01-23 ASSESSMENT — PAIN - FUNCTIONAL ASSESSMENT
PAIN_FUNCTIONAL_ASSESSMENT: PREVENTS OR INTERFERES SOME ACTIVE ACTIVITIES AND ADLS

## 2020-01-23 ASSESSMENT — PAIN DESCRIPTION - LOCATION
LOCATION: KNEE

## 2020-01-23 ASSESSMENT — PAIN DESCRIPTION - ORIENTATION
ORIENTATION: RIGHT

## 2020-01-23 ASSESSMENT — PAIN DESCRIPTION - PAIN TYPE
TYPE: ACUTE PAIN;SURGICAL PAIN
TYPE: ACUTE PAIN;SURGICAL PAIN
TYPE: SURGICAL PAIN
TYPE: ACUTE PAIN;SURGICAL PAIN
TYPE: SURGICAL PAIN
TYPE: ACUTE PAIN;SURGICAL PAIN
TYPE: SURGICAL PAIN
TYPE: ACUTE PAIN;SURGICAL PAIN
TYPE: ACUTE PAIN;SURGICAL PAIN
TYPE: SURGICAL PAIN
TYPE: SURGICAL PAIN
TYPE: ACUTE PAIN;SURGICAL PAIN
TYPE: SURGICAL PAIN
TYPE: ACUTE PAIN;SURGICAL PAIN
TYPE: ACUTE PAIN;SURGICAL PAIN
TYPE: SURGICAL PAIN
TYPE: SURGICAL PAIN
TYPE: ACUTE PAIN;SURGICAL PAIN

## 2020-01-23 ASSESSMENT — PAIN DESCRIPTION - DESCRIPTORS
DESCRIPTORS: ACHING

## 2020-01-23 ASSESSMENT — PAIN DESCRIPTION - ONSET
ONSET: ON-GOING

## 2020-01-23 ASSESSMENT — PAIN DESCRIPTION - FREQUENCY
FREQUENCY: CONTINUOUS

## 2020-01-23 ASSESSMENT — PAIN SCALES - GENERAL
PAINLEVEL_OUTOF10: 7
PAINLEVEL_OUTOF10: 6
PAINLEVEL_OUTOF10: 7
PAINLEVEL_OUTOF10: 7
PAINLEVEL_OUTOF10: 6
PAINLEVEL_OUTOF10: 7
PAINLEVEL_OUTOF10: 7
PAINLEVEL_OUTOF10: 6
PAINLEVEL_OUTOF10: 8
PAINLEVEL_OUTOF10: 8
PAINLEVEL_OUTOF10: 7
PAINLEVEL_OUTOF10: 6
PAINLEVEL_OUTOF10: 8
PAINLEVEL_OUTOF10: 8
PAINLEVEL_OUTOF10: 5
PAINLEVEL_OUTOF10: 8
PAINLEVEL_OUTOF10: 8
PAINLEVEL_OUTOF10: 5
PAINLEVEL_OUTOF10: 6
PAINLEVEL_OUTOF10: 7

## 2020-01-23 ASSESSMENT — PAIN SCALES - WONG BAKER
WONGBAKER_NUMERICALRESPONSE: 0

## 2020-01-23 ASSESSMENT — PAIN DESCRIPTION - PROGRESSION

## 2020-01-23 NOTE — PROGRESS NOTES
Infectious Diseases   Progress Note      Admission Date: 1/22/2020  Hospital Day: Hospital Day: 2   Attending: Lucian Avila MD  Date of service: 1/23/2020     Chief complaint/ Reason for consult: The patient was seen today for the following:    · Right knee prosthetic joint infection  · Status post right knee surgical I&D with hardware retention on 1/22/2020  · Status post right knee revision surgery on December 17, 2019 for medial lateral instability  · Smoker    Microbiology:        I have reviewed allavailable micro lab data and cultures    · Right knee surgical collected on 1/22/2020: In process        Antibiotics and immunizations:       Current antibiotics: All antibiotics and their doses were reviewed by me    Recent Abx Admin                   vancomycin (VANCOCIN) 1500 mg in dextrose 5 % 250 mL IVPB (mg) 1,500 mg New Bag 01/23/20 0940     1,500 mg New Bag 01/22/20 2119    cefepime (MAXIPIME) 2 g IVPB minibag (g) 2 g New Bag 01/23/20 0630     2 g New Bag 01/22/20 1903                  Immunization History: All immunization history was reviewed by me today.     Immunization History   Administered Date(s) Administered    HIB PRP-T (ActHIB, Hiberix) 09/13/2010    Hib, unspecified 02/09/2015    Influenza Virus Vaccine 02/03/2018    Influenza Whole 10/04/2012    Influenza, Intradermal, Preservative free 10/28/2014    Influenza, Intradermal, Quadrivalent, Preservative Free 12/13/2016    Influenza, Quadv, IM, PF (6 mo and older Fluzone, Flulaval, Fluarix, and 3 yrs and older Afluria) 12/18/2019    Influenza, Quadv, Recombinant, IM PF (Flublok 18 yrs and older) 10/15/2018    Meningococcal ACWY Vaccine 01/15/2009    Meningococcal MCV4O (Menveo) 08/10/2019, 10/30/2019    Meningococcal MCV4P (Menactra) 02/09/2015, 08/17/2019, 10/30/2019    Meningococcal MPSV4 (Menomune) 01/15/2009    Pneumococcal Conjugate 13-valent ([id number]) 02/09/2015    Pneumococcal Polysaccharide ([id number]) 01/15/2009, 10/09/2013, 08/09/2019    Td, unspecified formulation 01/01/2007    Tdap (Boostrix, Adacel) 01/01/2007, 05/03/2014       Known drug allergies: All allergies were reviewed and updated    Allergies   Allergen Reactions    Nsaids Nausea Only and Other (See Comments)     Hx of Barretts esophagus      Prochlorperazine Other (See Comments)     No allergic reaction, patient reported sense of \"restlessness\" and fidgiting    Valtrex [Valacyclovir Hcl] Diarrhea    Morphine Rash    Morphine And Related Itching    Tolmetin Nausea Only     Hx of Barretts esophagus       Social history:     Social History:  All social andepidemiologic history was reviewed and updated by me today as needed. · Tobacco use:   reports that he quit smoking about 2 years ago. His smoking use included cigarettes. He has a 12.50 pack-year smoking history. He has never used smokeless tobacco.  · Alcohol use:   reports no history of alcohol use. · Currently lives in: [address] [place] [address]  ·  reports no history of drug use. Assessment:     The patient is a 51 y.o. old male who  has a past medical history of Alcoholism ([address] Allergic migraine with status migrainosus, Allergic rhinitis, seasonal, Anemia, Arthritis, Vaughn's esophagus, Benign intracranial hypertension, Cancer ([address] Carpal tunnel syndrome, Chronic pain, Depression, Depression, GERD (gastroesophageal reflux disease), Headache(784.0), History of blood transfusion, History of tobacco use, Migraine, Morbid obesity ([address] Movement disorder, Onychomycosis, Sleep apnea, obstructive, Unspecified cerebral artery occlusion with cerebral infarction (2014), Wears dentures, and Wears glasses.  with following problems:    · Right knee prosthetic joint infection-ongoing  · Status post right knee surgical I&D with hardware retention on 1/22/2020-cultures in process  · Status post right knee revision surgery on December 17, 2019 for medial lateral instability  · Smoker-counseling patient. I/v access Management:    · Continue to monitor i.v access sites for erythema, induration,discharge or tenderness. · As always, continue efforts to minimize tubes/ lines/ drains as clinically appropriate to reduce chances of line associated infections. Patient education and counseling:     · The patient was educated in detail about the side-effects of various antibiotics and things to watch for like new rashes, lip swelling, severe reaction, worsening diarrhea, break through fever etc.  · Discussed patient's condition and what to expect. All of the patient's questions were addressed in a satisfactory manner and patient verbalized understanding all instructions. Smoking cessation/ Tobacco use disorder counseling:    I have counseled the patient extensively about risks of smoking and have encouraged the patient to maintain a healthy smoke-free lifestyle. Information was given about various smoking cessation programs and their websites like www.smokefree.gov, [place]. quitlogix. org as well as help lines like 1-800-QUIT-NOW and 1-800[employer]. Weight loss counseling:    Extensive weight loss counseling was done. It is important to set a realistic weight loss goal. First goal should be to avoid gaining more weight and staying at current weight (or within 5 percent). People at high risk of developing diabetes who are able to lose 5 percent of their body weight and maintain this weight will reduce their risk of developing diabetes by about 50 percent and reduce their blood pressure. Losing more than 15 percent of  body weight and staying at this weight is an extremely good result, even if you never reach your \"dream\" or \"ideal\" weight.     Lifestyle changes including changing eating habits, substituting excess carbohydrates with proteins, stress reduction, using self-help programs like Weight Watchers®, Overeaters Anonymous®, and Take Off Pounds Sensibly (TOPS)© , following DASH diet and increasing Dr.Robert Sanders     KNEE ARTHROSCOPY Right 7/11/12    RIGHT KNEE ARTHROSCOPY WITH CHONDROPLASTY    KNEE SURGERY  July 2012    right, arthroscopy    LAPAROTOMY      LASIK  2005    OTHER SURGICAL HISTORY Left 03/08/2016    CT biopsy ablasion left kidney    OTHER SURGICAL HISTORY  2016    small intestine 12 inch removed, 2 hernia surgeries, another surgery to drain infection from incision.  REVISION TOTAL KNEE ARTHROPLASTY Right 12/17/2019    RIGHT REVISION TIBIA TOTAL KNEE REPLACEMENT performed by Tin Marquez MD at [address] [facility] Right 1/22/2020    RIGHT KNEE IRRIGATION AND DEBRIDEMENT WITH POLY EXCHANGE [facility]    RIGHT    UPPER GASTROINTESTINAL ENDOSCOPY  6-7-2016    UPPER GASTROINTESTINAL ENDOSCOPY  04/02/2018       Family History: All family history was reviewed today. Problem Relation Age of Onset    Cancer Father         Lymphoma    Arthritis Mother     Dementia Other     Diabetes Other     Cancer Other     Diabetes Maternal Uncle     Heart Disease Maternal Uncle     Depression Other     Heart Disease Maternal Uncle        Objective:       PHYSICAL EXAM:      Vitals:   Vitals:    01/23/20 0531 01/23/20 0803 01/23/20 1143 01/23/20 1716   BP:  113/67 122/74 135/74   Pulse:  76 78 82   Resp:  17 17 17   Temp:  97.9 °F (36.6 °C) 98.4 °F (36.9 °C) 98 °F (36.7 °C)   TempSrc:  Oral Oral Oral   SpO2:  93% 96% 95%   Weight: 257 lb 8 oz (116.8 kg)      Height: 6' (1.829 m)          Physical Exam  Vitals signs and nursing note reviewed. Constitutional:       Appearance: Normal appearance. He is well-developed. HENT:      Head: Normocephalic and atraumatic. Right Ear: External ear normal.      Left Ear: External ear normal.      Nose: Nose normal. No congestion or rhinorrhea.       Mouth/Throat:      Mouth: Mucous membranes are moist.      Pharynx: No 12.6*   RBC 4.07*   HGB 12.2*   HCT 37.9*      MCV 93.1   MCH 30.1   MCHC 32.3   RDW 14.2        BMP:  Recent Labs     01/23/20  0505      K 4.2      CO2 24   BUN 8   CREATININE 0.9   CALCIUM 8.8   GLUCOSE 127*        Hepatic Function Panel:   Lab Results   Component Value Date    ALKPHOS 93 08/31/2019    ALT 12 08/31/2019    AST 12 08/31/2019    PROT 6.6 08/31/2019    PROT 6.7 01/07/2015    BILITOT 0.3 08/31/2019    BILIDIR <0.2 02/18/2015    IBILI 0.3 02/18/2015    LABALBU 4.4 12/03/2019       CPK:   Lab Results   Component Value Date    CKTOTAL 65 06/18/2014     ESR:   Lab Results   Component Value Date    SEDRATE 9 01/22/2020     CRP:   Lab Results   Component Value Date    CRP 47.8 (H) 01/22/2020           Imaging: All pertinent images and reports for the current visit were reviewed by me during this visit. No orders to display       Medications: All current and past medications were reviewed.      pantoprazole  40 mg Oral BID AC    ketorolac  15 mg Intravenous Once    atorvastatin  40 mg Oral Nightly    calcium-vitamin D  1 tablet Oral BID    FLUoxetine  20 mg Oral Daily    multivitamin  1 tablet Oral Daily    traZODone  200 mg Oral Nightly    sodium chloride flush  10 mL Intravenous 2 times per day    acetaminophen  650 mg Oral Q6H    docusate sodium  100 mg Oral BID    sennosides-docusate sodium  1 tablet Oral BID    insulin lispro  0-12 Units Subcutaneous TID WC    insulin lispro  0-6 Units Subcutaneous Nightly    enoxaparin  40 mg Subcutaneous Daily    cefepime  2 g Intravenous Q12H    vancomycin  1,500 mg Intravenous Q12H    vancomycin (VANCOCIN) intermittent dosing (placeholder)   Other RX Placeholder        sodium chloride 125 mL/hr at 01/23/20 1706    dextrose         dicyclomine, zolpidem, sodium chloride flush, HYDROmorphone **OR** HYDROmorphone, magnesium hydroxide, ondansetron, glucose, dextrose, glucagon (rDNA), dextrose, oxyCODONE **OR** oxyCODONE      Problem list:       Patient Active Problem List   Diagnosis Code    Insomnia-chronic intermittent--uses prn ambien--to be filled by dr Gustavo Abernathy (sleep dr) Cielo Taylor    Vaughn esophagus-on daily ppi-last egd 5/15--dr arce K22.70    History of tobacco useadvised to quit- quit 7/1/2014 Z87.891    Allergic rhinitis, seasonal J30.2    Obstructive sleep apnea,Severe -(on cpap) G47.33    Class 1 obesity due to excess calories with body mass index (BMI) of 34.0 to 34.9 in adult E66.09, Z68.34    Depression-on daily effexor xr--sees dr Jovany Aragon F32.9    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 Z90.81    Chondromalacia of patellofemoral joint M22.40    Chronic pain syndrome G89.4    History of stroke Z86.73    Intracranial hypertension G93.2    ETOHism (HCC)--1/16--detoxed at [facility]--attending AA F10.20    Gastroesophageal reflux disease with esophagitis--on daily ppi K21.0    Intractable chronic migraine without aura and with status migrainosus G43.711    Iron deficiency anemia D50.9    B12 deficiency E53.8    Anastomotic ulcer K28.9    [facility] 8/1/18 Dr Temo Aguilar. C64.2    Malignant neoplasm of left kidney (HCC) clear cell. 8/1/18 Dr Temo Aguilar. C64.2    Instability of knee joint, right M25.361    Total knee replacement status, right Z96.651    Failed total knee, right, initial encounter [facility] T84.012A    Infection of total knee replacement (HCC) T84.59XA, Z96.659    Smoker F17.200    History of depression Z86.59    History of alcoholism ([facility] F10.21    Therapeutic drug monitoring Z51.81       Please note that this chart was generated using Dragon dictation software. Although every effort was made to ensure the accuracy of this automated transcription, some errors in transcription may have occurred inadvertently.  If you may need any clarification, please do not hesitate to contact me through [facility]

## 2020-01-23 NOTE — PLAN OF CARE
Problem: Pain:  Goal: Pain level will decrease  Description  Pain level will decrease  1/23/2020 0801 by Jim Jules RN  Outcome: Ongoing  Note:   Assessing and monitoring pt's pain. PRN pain medication being given if ordered. Educating pt on nonpharmacologic interventions for pain control. Will continue to monitor and reassess. Problem: Pain:  Goal: Control of acute pain  Description  Control of acute pain  1/23/2020 0801 by Jim Jules RN  Outcome: Ongoing  Note:   Assessing and monitoring pt's pain. PRN pain medication being given if ordered. Educating pt on nonpharmacologic interventions for pain control. Will continue to monitor and reassess. Problem: Pain:  Goal: Control of chronic pain  Description  Control of chronic pain  1/23/2020 0801 by Jim Jules RN  Outcome: Ongoing  Note:   Assessing and monitoring pt's pain. PRN pain medication being given if ordered. Educating pt on nonpharmacologic interventions for pain control. Will continue to monitor and reassess. Problem: Falls - Risk of:  Goal: Will remain free from falls  Description  Will remain free from falls  1/23/2020 0801 by Jim Jules RN  Outcome: Ongoing  Note:   Fall risk assessment completed. Fall precautions in place. Bed in lowest position, wheels locked, bed/chair exit alarm in place, call light within reach, and non skid footwear on. Walkway free of clutter. Pt alert and oriented and able to make needs known. Pt educated to use call light when needing to get up, and pt utilizes call light to make needs known. Will continue to monitor.         Problem: Falls - Risk of:  Goal: Absence of physical injury  Description  Absence of physical injury  1/23/2020 0801 by Jim Jules RN  Outcome: Ongoing  Note:   Pt absent from physical injury on this shift      Problem: Risk for Impaired Skin Integrity  Goal: Tissue integrity - skin and mucous membranes  Description  Structural intactness and normal physiological function of skin and  mucous membranes. 1/23/2020 0801 by Krystle Chung, RN  Outcome: Ongoing  Note:   Skin being assessed during this shift. Andrei protocol in place. Feet being elevated. Encouraging pt to turn every 2 hours. No signs of new skin breakdown. Will continue to monitor and reassess.

## 2020-01-23 NOTE — PROGRESS NOTES
mobility with RW with SBA, steady with no overt LOB. Assisted Ortho NP to change dressing and place knee immobilizer. Anticipate pt may require min A for LB ADLs and assist to don knee immobilizer initially - anticipate wife can assist. Anticipate pt safe to d/c home with assistance from family. Continue to assess for need for home OT or to return to outpatient PT when ready. Prognosis: Good  Decision Making: Low Complexity  History: PMH: GERD, depression, Ca, ETOH abuse, CVA  Exam: ADLs, transfers, func mob, bed mob  Assistance / Modification: SBA for mobility, min/mod A for ADLs   OT Education: OT Role;Precautions;Plan of Care;ADL Adaptive Strategies;Transfer Training  REQUIRES OT FOLLOW UP: Yes  Activity Tolerance  Activity Tolerance: Patient Tolerated treatment well  Safety Devices  Safety Devices in place: Yes(RN [facility]) notified)  Type of devices: Left in chair;Call light within reach;Nurse notified           Patient Diagnosis(es): There were no encounter diagnoses. has a past medical history of Alcoholism ([address] Allergic migraine with status migrainosus, Allergic rhinitis, seasonal, Anemia, Arthritis, Vaughn's esophagus, Benign intracranial hypertension, Cancer ([address] Carpal tunnel syndrome, Chronic pain, Depression, Depression, GERD (gastroesophageal reflux disease), Headache(784.0), History of blood transfusion, History of tobacco use, Migraine, Morbid obesity ([address] Movement disorder, Onychomycosis, Sleep apnea, obstructive, Unspecified cerebral artery occlusion with cerebral infarction, Wears dentures, and Wears glasses. has a past surgical history that includes Gastric bypass surgery (Jan 2009); Splenectomy; LASIK (2005); knee surgery (July 2012); Carpal tunnel release; laparotomy; Knee arthroscopy (Right, 7/11/2013); Knee arthroscopy (Right, 7/11/12); Total knee arthroplasty (10/15/13); Endoscopy, colon, diagnostic; Dilatation, esophagus; eye surgery; joint replacement;  Abdominal exploration surgery (1/11/16); other surgical history (Left, 03/08/2016); hernia repair (3-); Upper gastrointestinal endoscopy (6-7-2016); other surgical history (2016); Upper gastrointestinal endoscopy (04/02/2018); Kidney removal (Left, 08/01/2018); Abdomen surgery; Abdomen surgery (4-7-2016); Revision total knee arthroplasty (Right, 12/17/2019); and Revision total knee arthroplasty (Right, 1/22/2020). Restrictions  Restrictions/Precautions  Restrictions/Precautions: Fall Risk  Position Activity Restriction  Other position/activity restrictions: WBAT - Knee immobilizer on - no forced bending for 2 weeks    Subjective   General  Chart Reviewed: Yes  Patient assessed for rehabilitation services?: Yes  Additional Pertinent Hx: Pt is 51 y.o. M who presents with infection of R TKA (original completed in 2011, revision completed in Dec 2019). Pt is s/p I&D and poly exchange of R TKA. Pt is WBAT in knee immobilizer with no forced knee flexion for 2 weeks. PMH: GERD, depression, Ca, ETOH abuse, CVA  Family / Caregiver Present: No  Referring Practitioner: Deysi Lozano MD   Diagnosis: Infection of RTKA   Subjective  Subjective: Pt met bedside, agreeable for therapy evaluation and OOB activity. Assisted orthopedic NP with dressing change and placement of knee immobilizer.    Patient Currently in Pain: (Mild/moderate pain )  Pain Assessment  Pain Level: 8  Vital Signs  Patient Currently in Pain: (Mild/moderate pain )     Social/Functional History  Social/Functional History  Lives With: Spouse, Son, Daughter(and Mother)  Type of Home: House  Home Layout: Able to Live on Main level with bedroom/bathroom  Home Access: Stairs to enter with rails, Stairs to enter without rails  Entrance Stairs - Number of Steps: 3 inés in front with no rail  and 5 inés in back with bilateral rails  Bathroom Shower/Tub: Walk-in shower, Shower chair without back  Bathroom Toilet: Standard  Bathroom Equipment: Hand-held shower  Home Equipment: [facility]          Plan   Plan  Times per week: 3-5  Times per day: Daily  Current Treatment Recommendations: Strengthening, Functional Mobility Training, Endurance Training, Balance Training, Safety Education & Training, Equipment Evaluation, Education, & procurement, Patient/Caregiver Education & Training, Self-Care / ADL      AM-PAC Score    Shila Snyder scored a 19/24 on the AM-PAC ADL Inpatient form. Current research shows that an AM-PAC score of 18 or greater is typically associated with a discharge to the patient's home setting. Based on the patients AM-PAC score and their current ADL deficits, it is recommended that the patient have 2-3 sessions per week of Occupational Therapy at d/c to increase the patients independence. AM-PAC Inpatient Daily Activity Raw Score: 19 (01/23/20 1507)  AM-PAC Inpatient ADL T-Scale Score : 40.22 (01/23/20 1507)  ADL Inpatient CMS 0-100% Score: 42.8 (01/23/20 1507)  ADL Inpatient CMS G-Code Modifier : CK (01/23/20 1507)    Goals  Short term goals  Time Frame for Short term goals: prior to d/c  Short term goal 1: Pt will complete functional mobility and transfers with SPV  Short term goal 2: Pt will complete bathing with SPV  Short term goal 3: Pt will complete dressing with SPV  Short term goal 4: Pt will complete toileting with SPV  Short term goal 5: Pt will complete grooming in stance at sink with SPV  Patient Goals   Patient goals : \"to go home and continue on - I was doing well\"       Therapy Time   Individual Concurrent Group Co-treatment   Time In       0935   Time Out       1020   Minutes       45   Timed Code Treatment Minutes: 30 Minutes(15 min eval )     If pt is discharged prior to next OT session, this note will serve as the discharge summary.     Arley Carvajal, GPJ/P#[id number]

## 2020-01-23 NOTE — PROGRESS NOTES
Pressure dressing removed at this time. Ace bandage left on leg is clean/dry/intact. Family is now at bedside and pt is up in the chair eating lunch. IV fluids are infusing. Will continue to monitor and reassess.   Electronically signed by Russ Hall RN on 1/23/2020 at 12:05 PM

## 2020-01-23 NOTE — PROGRESS NOTES
Nicol performed this morning including Nurse navigator Samy Castro, Physical therapist francisco j, and Occupational therapist Jessica Marroquin. Discussed plan of care, discharge plan, and dme needs if applicable for orthopedic total joint patient.   Electronically signed by Amairani Zacarias RN on 1/23/2020 at 11:12 AM

## 2020-01-23 NOTE — PROGRESS NOTES
Pt AAO x4 per this shift. Pt C/O of pain to left knee; managed with PRN medication per MD orders, ice, elevation. Effective but ongoing. Pt has been up to bathroom with nurse and walker per this shift. Tolerating ambulating well. Pt tolerating PO fluids. No further needs voiced at this time. Fall precautions in place. Bed alarm on. Call light within reach. Will continue to monitor.

## 2020-01-23 NOTE — PROGRESS NOTES
Patient is alert & oriented x4, x1 assist with walker, 2/4 bed rails up, bed in lowest position, fall precautions in place, call light within reach. Morning medications given without difficulty. PRN pain medication being given. Per patient, with previous surgeries controlling pain has been an issue. Morning assessment complete. No further needs at this time. Will cont to monitor and reassess.   Electronically signed by Maycol Davies RN on 1/23/2020 at 8:00 AM

## 2020-01-23 NOTE — PROGRESS NOTES
Physical Therapy    Facility/Department: [facility] 3W ORTHOPEDICS  Initial Assessment    NAME: Dottie Rai  : 1970  MRN: [id number]    Date of Service: 2020    Assessment / Discharge Recommendations:  -anticipate home at discharge when medically ready  -has needed equipment at home   -return to Outpatient PT vs Home PT. ..... TBD      Activity limitations: Decreased functional mobility ; Decreased strength;Decreased ADL status; Decreased ROM  Prognosis: Good  Decision Making: Medium Complexity  REQUIRES PT FOLLOW UP: Yes  Activity Tolerance  Activity Tolerance: Patient Tolerated treatment well       Patient Diagnosis(es): There were no encounter diagnoses. has a past medical history of Alcoholism ([address] Allergic migraine with status migrainosus, Allergic rhinitis, seasonal, Anemia, Arthritis, Vaughn's esophagus, Benign intracranial hypertension, Cancer ([address] Carpal tunnel syndrome, Chronic pain, Depression, Depression, GERD (gastroesophageal reflux disease), Headache(784.0), History of blood transfusion, History of tobacco use, Migraine, Morbid obesity ([address] Movement disorder, Onychomycosis, Sleep apnea, obstructive, Unspecified cerebral artery occlusion with cerebral infarction, Wears dentures, and Wears glasses. has a past surgical history that includes Gastric bypass surgery (2009); Splenectomy; LASIK (); knee surgery (2012); Carpal tunnel release; laparotomy; Knee arthroscopy (Right, 2013); Knee arthroscopy (Right, 12); Total knee arthroplasty (10/15/13); Endoscopy, colon, diagnostic; Dilatation, esophagus; eye surgery; joint replacement; Abdominal exploration surgery (16); other surgical history (Left, 2016); hernia repair (3-); Upper gastrointestinal endoscopy (2016); other surgical history (); Upper gastrointestinal endoscopy (2018); Kidney removal (Left, 2018); Abdomen surgery; Abdomen surgery (2016);  Revision total knee

## 2020-01-23 NOTE — PROGRESS NOTES
[facility] Orthopedic Surgery   Progress Note      S/P :  SUBJECTIVE  In bed initially. Alert and oriented. Pain is   described in right knee with activity   OBJECTIVE              Physical                      VITALS:  /67   Pulse 76   Temp 97.9 °F (36.6 °C) (Oral)   Resp 17   Ht 6' (1.829 m)   Wt 257 lb 8 oz (116.8 kg)   SpO2 93%   BMI 34.92 kg/m²                     MUSCULOSKELETAL:  right foot NVI. Wiggles toes to command. Pedal pulses are palpable. NEUROLOGIC:                                  Sensory:  Touch:  Right Lower Extremity:  normal                                                 Surgical wound appears saturated with serosang drainage right knee dressing, There has been one reinforced dressing also saturated from last night . I removed all dressings to reveal sutured wound intact with trickle of thin watery red drainage from mid knee wound. Wound cleansed with rubbing alcohol then 4 ABD pads then ACE applied. Applied pressure dressing of rolled intact kerlix to anterior knee with firm ACE wrap. Immobilizer fitted per PT OT . Then pt assisted to stand and walk with walker. WBAT. He did very well with mobility.      Data       CBC:   Lab Results   Component Value Date    WBC 12.6 01/23/2020    RBC 4.07 01/23/2020    RBC 4.94 01/07/2015    HGB 12.2 01/23/2020    HCT 37.9 01/23/2020    MCV 93.1 01/23/2020    MCH 30.1 01/23/2020    MCHC 32.3 01/23/2020    RDW 14.2 01/23/2020     01/23/2020    MPV 8.9 01/23/2020        WBC:    Lab Results   Component Value Date    WBC 12.6 01/23/2020        Hemoglobin/Hematocrit:    Lab Results   Component Value Date    HGB 12.2 01/23/2020    HCT 37.9 01/23/2020        PT/INR:    Lab Results   Component Value Date    PROTIME 12.8 12/03/2019    INR 1.10 12/03/2019              Current Inpatient Medications             Current Facility-Administered Medications: atorvastatin (LIPITOR) tablet 40 mg, 40 mg, Oral, Nightly  calcium-vitamin D 500-200 MG-UNIT per tablet 1 tablet, 1 tablet, Oral, BID  dicyclomine (BENTYL) tablet 40 mg, 40 mg, Oral, BID PRN  FLUoxetine (PROZAC) capsule 20 mg, 20 mg, Oral, Daily  multivitamin 1 tablet, 1 tablet, Oral, Daily  pantoprazole (PROTONIX) tablet 40 mg, 40 mg, Oral, Daily  traZODone (DESYREL) tablet 200 mg, 200 mg, Oral, Nightly  zolpidem (AMBIEN) tablet 10 mg, 10 mg, Oral, Nightly PRN  0.9 % sodium chloride infusion, , Intravenous, Continuous  sodium chloride flush 0.9 % injection 10 mL, 10 mL, Intravenous, 2 times per day  sodium chloride flush 0.9 % injection 10 mL, 10 mL, Intravenous, PRN  acetaminophen (TYLENOL) tablet 650 mg, 650 mg, Oral, Q6H  HYDROmorphone (DILAUDID) injection 0.25 mg, 0.25 mg, Intravenous, Q3H PRN **OR** HYDROmorphone (DILAUDID) injection 0.5 mg, 0.5 mg, Intravenous, Q3H PRN  docusate sodium (COLACE) capsule 100 mg, 100 mg, Oral, BID  sennosides-docusate sodium (SENOKOT-S) 8.6-50 MG tablet 1 tablet, 1 tablet, Oral, BID  magnesium hydroxide (MILK OF MAGNESIA) 400 MG/5ML suspension 30 mL, 30 mL, Oral, Daily PRN  ondansetron (ZOFRAN) injection 4 mg, 4 mg, Intravenous, Q6H PRN  insulin lispro (HUMALOG) injection vial 0-12 Units, 0-12 Units, Subcutaneous, TID WC  insulin lispro (HUMALOG) injection vial 0-6 Units, 0-6 Units, Subcutaneous, Nightly  glucose (GLUTOSE) 40 % oral gel 15 g, 15 g, Oral, PRN  dextrose 50 % IV solution, 12.5 g, Intravenous, PRN  glucagon (rDNA) injection 1 mg, 1 mg, Intramuscular, PRN  dextrose 5 % solution, 100 mL/hr, Intravenous, PRN  oxyCODONE (ROXICODONE) immediate release tablet 5 mg, 5 mg, Oral, Q4H PRN **OR** oxyCODONE HCl (OXY-IR) immediate release tablet 10 mg, 10 mg, Oral, Q4H PRN  enoxaparin (LOVENOX) injection 40 mg, 40 mg, Subcutaneous, Daily  cefepime (MAXIPIME) 2 g IVPB minibag, 2 g, Intravenous, Q12H  vancomycin (VANCOCIN) 1500 mg in dextrose 5 % 250 mL IVPB, 1,500 mg, Intravenous, Q12H  vancomycin (VANCOCIN) intermittent dosing (placeholder), , Other, RX

## 2020-01-24 ENCOUNTER — APPOINTMENT (OUTPATIENT)
Dept: GENERAL RADIOLOGY | Age: 50
DRG: 487 | End: 2020-01-24
Attending: ORTHOPAEDIC SURGERY
Payer: COMMERCIAL

## 2020-01-24 ENCOUNTER — APPOINTMENT (OUTPATIENT)
Dept: PHYSICAL THERAPY | Age: 50
End: 2020-01-24
Payer: COMMERCIAL

## 2020-01-24 LAB
ANION GAP SERPL CALCULATED.3IONS-SCNC: 11 MMOL/L (ref 3–16)
BUN BLDV-MCNC: 8 MG/DL (ref 7–20)
CALCIUM SERPL-MCNC: 8.6 MG/DL (ref 8.3–10.6)
CHLORIDE BLD-SCNC: 100 MMOL/L (ref 99–110)
CO2: 24 MMOL/L (ref 21–32)
CREAT SERPL-MCNC: 1 MG/DL (ref 0.9–1.3)
GFR AFRICAN AMERICAN: >60
GFR NON-AFRICAN AMERICAN: >60
GLUCOSE BLD-MCNC: 102 MG/DL (ref 70–99)
GLUCOSE BLD-MCNC: 108 MG/DL (ref 70–99)
GLUCOSE BLD-MCNC: 146 MG/DL (ref 70–99)
GLUCOSE BLD-MCNC: 97 MG/DL (ref 70–99)
GLUCOSE BLD-MCNC: 97 MG/DL (ref 70–99)
PERFORMED ON: ABNORMAL
PERFORMED ON: NORMAL
POTASSIUM SERPL-SCNC: 4.1 MMOL/L (ref 3.5–5.1)
SODIUM BLD-SCNC: 135 MMOL/L (ref 136–145)

## 2020-01-24 PROCEDURE — 2580000003 HC RX 258: Performed by: NURSE PRACTITIONER

## 2020-01-24 PROCEDURE — 94660 CPAP INITIATION&MGMT: CPT

## 2020-01-24 PROCEDURE — 2580000003 HC RX 258: Performed by: ORTHOPAEDIC SURGERY

## 2020-01-24 PROCEDURE — 36415 COLL VENOUS BLD VENIPUNCTURE: CPT

## 2020-01-24 PROCEDURE — 80048 BASIC METABOLIC PNL TOTAL CA: CPT

## 2020-01-24 PROCEDURE — 80202 ASSAY OF VANCOMYCIN: CPT

## 2020-01-24 PROCEDURE — 6370000000 HC RX 637 (ALT 250 FOR IP): Performed by: ORTHOPAEDIC SURGERY

## 2020-01-24 PROCEDURE — 6370000000 HC RX 637 (ALT 250 FOR IP): Performed by: NURSE PRACTITIONER

## 2020-01-24 PROCEDURE — 76937 US GUIDE VASCULAR ACCESS: CPT

## 2020-01-24 PROCEDURE — 36569 INSJ PICC 5 YR+ W/O IMAGING: CPT

## 2020-01-24 PROCEDURE — 71045 X-RAY EXAM CHEST 1 VIEW: CPT

## 2020-01-24 PROCEDURE — 6360000002 HC RX W HCPCS: Performed by: INTERNAL MEDICINE

## 2020-01-24 PROCEDURE — 6360000002 HC RX W HCPCS: Performed by: ORTHOPAEDIC SURGERY

## 2020-01-24 PROCEDURE — 6360000002 HC RX W HCPCS: Performed by: NURSE PRACTITIONER

## 2020-01-24 PROCEDURE — 1200000000 HC SEMI PRIVATE

## 2020-01-24 PROCEDURE — 2580000003 HC RX 258: Performed by: INTERNAL MEDICINE

## 2020-01-24 PROCEDURE — 02HV33Z INSERTION OF INFUSION DEVICE INTO SUPERIOR VENA CAVA, PERCUTANEOUS APPROACH: ICD-10-PCS | Performed by: NURSE PRACTITIONER

## 2020-01-24 PROCEDURE — C1751 CATH, INF, PER/CENT/MIDLINE: HCPCS

## 2020-01-24 PROCEDURE — 94760 N-INVAS EAR/PLS OXIMETRY 1: CPT

## 2020-01-24 RX ORDER — LIDOCAINE HYDROCHLORIDE 10 MG/ML
5 INJECTION, SOLUTION EPIDURAL; INFILTRATION; INTRACAUDAL; PERINEURAL ONCE
Status: DISCONTINUED | OUTPATIENT
Start: 2020-01-24 | End: 2020-01-25 | Stop reason: HOSPADM

## 2020-01-24 RX ORDER — SODIUM CHLORIDE 0.9 % (FLUSH) 0.9 %
10 SYRINGE (ML) INJECTION PRN
Status: DISCONTINUED | OUTPATIENT
Start: 2020-01-24 | End: 2020-01-25 | Stop reason: HOSPADM

## 2020-01-24 RX ORDER — SODIUM CHLORIDE 0.9 % (FLUSH) 0.9 %
10 SYRINGE (ML) INJECTION EVERY 12 HOURS SCHEDULED
Status: DISCONTINUED | OUTPATIENT
Start: 2020-01-24 | End: 2020-01-25 | Stop reason: HOSPADM

## 2020-01-24 RX ORDER — KETOROLAC TROMETHAMINE 15 MG/ML
15 INJECTION, SOLUTION INTRAMUSCULAR; INTRAVENOUS ONCE
Status: COMPLETED | OUTPATIENT
Start: 2020-01-24 | End: 2020-01-24

## 2020-01-24 RX ADMIN — HYDROMORPHONE HYDROCHLORIDE 0.5 MG: 1 INJECTION, SOLUTION INTRAMUSCULAR; INTRAVENOUS; SUBCUTANEOUS at 18:21

## 2020-01-24 RX ADMIN — Medication 1500 MG: at 09:56

## 2020-01-24 RX ADMIN — ACETAMINOPHEN 650 MG: 325 TABLET ORAL at 04:54

## 2020-01-24 RX ADMIN — HYDROMORPHONE HYDROCHLORIDE 0.5 MG: 1 INJECTION, SOLUTION INTRAMUSCULAR; INTRAVENOUS; SUBCUTANEOUS at 08:00

## 2020-01-24 RX ADMIN — INSULIN LISPRO 1 UNITS: 100 INJECTION, SOLUTION INTRAVENOUS; SUBCUTANEOUS at 18:22

## 2020-01-24 RX ADMIN — DOCUSATE SODIUM 100 MG: 100 CAPSULE, LIQUID FILLED ORAL at 20:52

## 2020-01-24 RX ADMIN — SODIUM CHLORIDE, PRESERVATIVE FREE 10 ML: 5 INJECTION INTRAVENOUS at 08:00

## 2020-01-24 RX ADMIN — THERA TABS 1 TABLET: TAB at 07:59

## 2020-01-24 RX ADMIN — PANTOPRAZOLE SODIUM 40 MG: 40 TABLET, DELAYED RELEASE ORAL at 06:32

## 2020-01-24 RX ADMIN — FLUOXETINE 20 MG: 20 CAPSULE ORAL at 07:59

## 2020-01-24 RX ADMIN — DOCUSATE SODIUM 100 MG: 100 CAPSULE, LIQUID FILLED ORAL at 07:59

## 2020-01-24 RX ADMIN — PANTOPRAZOLE SODIUM 40 MG: 40 TABLET, DELAYED RELEASE ORAL at 18:31

## 2020-01-24 RX ADMIN — ACETAMINOPHEN 650 MG: 325 TABLET ORAL at 23:41

## 2020-01-24 RX ADMIN — OXYCODONE HYDROCHLORIDE 10 MG: 10 TABLET ORAL at 23:41

## 2020-01-24 RX ADMIN — ATORVASTATIN CALCIUM 40 MG: 40 TABLET, FILM COATED ORAL at 20:52

## 2020-01-24 RX ADMIN — HYDROMORPHONE HYDROCHLORIDE 0.5 MG: 1 INJECTION, SOLUTION INTRAMUSCULAR; INTRAVENOUS; SUBCUTANEOUS at 15:15

## 2020-01-24 RX ADMIN — SENNOSIDES AND DOCUSATE SODIUM 1 TABLET: 8.6; 5 TABLET ORAL at 20:52

## 2020-01-24 RX ADMIN — HYDROMORPHONE HYDROCHLORIDE 0.5 MG: 1 INJECTION, SOLUTION INTRAMUSCULAR; INTRAVENOUS; SUBCUTANEOUS at 22:29

## 2020-01-24 RX ADMIN — OYSTER SHELL CALCIUM WITH VITAMIN D 1 TABLET: 500; 200 TABLET, FILM COATED ORAL at 08:00

## 2020-01-24 RX ADMIN — CEFEPIME HYDROCHLORIDE 2 G: 2 INJECTION, POWDER, FOR SOLUTION INTRAVENOUS at 06:32

## 2020-01-24 RX ADMIN — VANCOMYCIN HYDROCHLORIDE 1500 MG: 5 INJECTION, POWDER, LYOPHILIZED, FOR SOLUTION INTRAVENOUS at 23:47

## 2020-01-24 RX ADMIN — HYDROMORPHONE HYDROCHLORIDE 0.5 MG: 1 INJECTION, SOLUTION INTRAMUSCULAR; INTRAVENOUS; SUBCUTANEOUS at 12:10

## 2020-01-24 RX ADMIN — ENOXAPARIN SODIUM 40 MG: 40 INJECTION SUBCUTANEOUS at 08:00

## 2020-01-24 RX ADMIN — SODIUM CHLORIDE, PRESERVATIVE FREE 10 ML: 5 INJECTION INTRAVENOUS at 20:59

## 2020-01-24 RX ADMIN — CEFEPIME HYDROCHLORIDE 2 G: 2 INJECTION, POWDER, FOR SOLUTION INTRAVENOUS at 18:21

## 2020-01-24 RX ADMIN — ACETAMINOPHEN 650 MG: 325 TABLET ORAL at 12:10

## 2020-01-24 RX ADMIN — HYDROMORPHONE HYDROCHLORIDE 0.5 MG: 1 INJECTION, SOLUTION INTRAMUSCULAR; INTRAVENOUS; SUBCUTANEOUS at 00:36

## 2020-01-24 RX ADMIN — OYSTER SHELL CALCIUM WITH VITAMIN D 1 TABLET: 500; 200 TABLET, FILM COATED ORAL at 20:52

## 2020-01-24 RX ADMIN — TRAZODONE HYDROCHLORIDE 200 MG: 100 TABLET ORAL at 20:52

## 2020-01-24 RX ADMIN — OXYCODONE HYDROCHLORIDE 10 MG: 10 TABLET ORAL at 13:59

## 2020-01-24 RX ADMIN — OXYCODONE HYDROCHLORIDE 10 MG: 10 TABLET ORAL at 03:28

## 2020-01-24 RX ADMIN — KETOROLAC TROMETHAMINE 15 MG: 15 INJECTION, SOLUTION INTRAMUSCULAR; INTRAVENOUS at 09:56

## 2020-01-24 RX ADMIN — ACETAMINOPHEN 650 MG: 325 TABLET ORAL at 18:21

## 2020-01-24 RX ADMIN — SODIUM CHLORIDE, PRESERVATIVE FREE 10 ML: 5 INJECTION INTRAVENOUS at 20:58

## 2020-01-24 RX ADMIN — SENNOSIDES AND DOCUSATE SODIUM 1 TABLET: 8.6; 5 TABLET ORAL at 07:59

## 2020-01-24 RX ADMIN — HYDROMORPHONE HYDROCHLORIDE 0.5 MG: 1 INJECTION, SOLUTION INTRAMUSCULAR; INTRAVENOUS; SUBCUTANEOUS at 04:53

## 2020-01-24 RX ADMIN — OXYCODONE HYDROCHLORIDE 10 MG: 10 TABLET ORAL at 09:56

## 2020-01-24 RX ADMIN — ZOLPIDEM TARTRATE 10 MG: 5 TABLET ORAL at 23:41

## 2020-01-24 ASSESSMENT — ENCOUNTER SYMPTOMS
WHEEZING: 0
DIARRHEA: 0
CONSTIPATION: 0
EYE DISCHARGE: 0
RHINORRHEA: 0
NAUSEA: 0
BACK PAIN: 0
COUGH: 0
TROUBLE SWALLOWING: 0
SHORTNESS OF BREATH: 0
EYE REDNESS: 0
ABDOMINAL PAIN: 0
SORE THROAT: 0

## 2020-01-24 ASSESSMENT — PAIN DESCRIPTION - PROGRESSION

## 2020-01-24 ASSESSMENT — PAIN - FUNCTIONAL ASSESSMENT
PAIN_FUNCTIONAL_ASSESSMENT: PREVENTS OR INTERFERES SOME ACTIVE ACTIVITIES AND ADLS

## 2020-01-24 ASSESSMENT — PAIN DESCRIPTION - ONSET
ONSET: ON-GOING

## 2020-01-24 ASSESSMENT — PAIN DESCRIPTION - PAIN TYPE
TYPE: ACUTE PAIN;SURGICAL PAIN
TYPE: ACUTE PAIN;SURGICAL PAIN
TYPE: SURGICAL PAIN
TYPE: ACUTE PAIN;SURGICAL PAIN
TYPE: SURGICAL PAIN
TYPE: ACUTE PAIN;SURGICAL PAIN
TYPE: SURGICAL PAIN
TYPE: ACUTE PAIN;SURGICAL PAIN
TYPE: SURGICAL PAIN
TYPE: ACUTE PAIN;SURGICAL PAIN
TYPE: ACUTE PAIN;SURGICAL PAIN

## 2020-01-24 ASSESSMENT — PAIN DESCRIPTION - DESCRIPTORS
DESCRIPTORS: ACHING

## 2020-01-24 ASSESSMENT — PAIN SCALES - GENERAL
PAINLEVEL_OUTOF10: 7
PAINLEVEL_OUTOF10: 8
PAINLEVEL_OUTOF10: 8
PAINLEVEL_OUTOF10: 6
PAINLEVEL_OUTOF10: 6
PAINLEVEL_OUTOF10: 9
PAINLEVEL_OUTOF10: 7
PAINLEVEL_OUTOF10: 7
PAINLEVEL_OUTOF10: 8
PAINLEVEL_OUTOF10: 8
PAINLEVEL_OUTOF10: 7
PAINLEVEL_OUTOF10: 10
PAINLEVEL_OUTOF10: 8
PAINLEVEL_OUTOF10: 6
PAINLEVEL_OUTOF10: 8
PAINLEVEL_OUTOF10: 7
PAINLEVEL_OUTOF10: 6
PAINLEVEL_OUTOF10: 8

## 2020-01-24 ASSESSMENT — PAIN DESCRIPTION - ORIENTATION
ORIENTATION: RIGHT

## 2020-01-24 ASSESSMENT — PAIN DESCRIPTION - FREQUENCY
FREQUENCY: CONTINUOUS

## 2020-01-24 ASSESSMENT — PAIN DESCRIPTION - LOCATION
LOCATION: KNEE

## 2020-01-24 ASSESSMENT — PAIN SCALES - WONG BAKER
WONGBAKER_NUMERICALRESPONSE: 0

## 2020-01-24 ASSESSMENT — PAIN DESCRIPTION - DIRECTION
RADIATING_TOWARDS: RIGHT
RADIATING_TOWARDS: RIGHT

## 2020-01-24 NOTE — PROGRESS NOTES
Patient is alert & oriented x4, x1 assist with walker, 2/4 bed rails up, bed in lowest position, fall precautions in place, call light within reach. Morning assessment complete. Morning medications given, PRN pain medication given see MAR. Pt is sitting up eating breakfast. Told patient that when he is done eating, his dressing with be changed. Pt verbalized understanding. Pt is still stating that his pain is not controlled and is expressing frustration. Will cont to monitor and reassess.   Electronically signed by Shea Fuentes RN on 1/24/2020 at 8:14 AM

## 2020-01-24 NOTE — PLAN OF CARE
Problem: Pain:  Goal: Pain level will decrease  Description  Pain level will decrease  Outcome: Ongoing  Note:   Pain/discomfort being managed with PRN analgesics per MD orders. Pt able to express presence and absence of pain and rate pain appropriately using numerical scale. Goal: Control of acute pain  Description  Control of acute pain  Outcome: Ongoing  Note:   Patient educated on acute pain. Taught patient to use call light to ask for pain medication. PRN pain medication given for acute pain. Will continue to monitor pain per unit protocol. Goal: Control of chronic pain  Description  Control of chronic pain  Outcome: Ongoing  Note:   Patient educated on chronic pain. Taught patient to use call light to ask for pain medication. PRN pain medication given for chronic pain. Will continue to monitor pain per unit protocol. Problem: Falls - Risk of:  Goal: Will remain free from falls  Description  Will remain free from falls  Outcome: Ongoing  Note:   Fall risk assessment completed . Fall precautions in place, bed/ chair alarm on, side rails 2/4 up, call light in reach, educated pt on calling for assistance when needed, room clear of clutter. Pt verbalized understanding. Goal: Absence of physical injury  Description  Absence of physical injury  Outcome: Ongoing  Note:   Pt is free of injury. No injury noted. Fall precautions in place. Call light within reach. Will monitor. Problem: Risk for Impaired Skin Integrity  Goal: Tissue integrity - skin and mucous membranes  Description  Structural intactness and normal physiological function of skin and  mucous membranes. Outcome: Ongoing  Note:   Skin assessment complete. No new signs of skin breakdown noted. Assistance provided with repositioning while in bed.

## 2020-01-24 NOTE — PLAN OF CARE
Problem: Pain:  Goal: Pain level will decrease  Description  Pain level will decrease  1/24/2020 0721 by Gilles Vengeas RN  Outcome: Ongoing  Note:   Assessing and monitoring pt's pain. PRN pain medication being given if ordered. Educating pt on nonpharmacologic interventions for pain control. Will continue to monitor and reassess. Problem: Pain:  Goal: Control of acute pain  Description  Control of acute pain  1/24/2020 0721 by Gilles Venegas RN  Outcome: Ongoing  Note:   Assessing and monitoring pt's pain. PRN pain medication being given if ordered. Educating pt on nonpharmacologic interventions for pain control. Will continue to monitor and reassess. Problem: Pain:  Goal: Control of chronic pain  Description  Control of chronic pain  1/24/2020 0721 by Gilles Venegas RN  Outcome: Ongoing  Note:   Assessing and monitoring pt's pain. PRN pain medication being given if ordered. Educating pt on nonpharmacologic interventions for pain control. Will continue to monitor and reassess. Problem: Falls - Risk of:  Goal: Will remain free from falls  Description  Will remain free from falls  1/24/2020 8853 by Gilles Venegas RN  Outcome: Ongoing  Note:   Fall risk assessment completed. Fall precautions in place. Bed in lowest position, wheels locked, bed/chair exit alarm in place, call light within reach, and non skid footwear on. Walkway free of clutter. Pt alert and oriented and able to make needs known. Pt educated to use call light when needing to get up, and pt utilizes call light to make needs known. Will continue to monitor.         Problem: Falls - Risk of:  Goal: Absence of physical injury  Description  Absence of physical injury  1/24/2020 9234 by Gilles Venegas RN  Outcome: Ongoing  Note:   Pt absent from physical injury      Problem: Risk for Impaired Skin Integrity  Goal: Tissue integrity - skin and mucous membranes  Description  Structural intactness and normal physiological

## 2020-01-24 NOTE — PROGRESS NOTES
[facility] Orthopedic Surgery   Progress Note      S/P :  SUBJECTIVE  In bed. Alert and oriented. States he is very tired. Not sleeping well at hospital. Pain is   described in right knee and with the intensity of moderate. Pain is described as aching. OBJECTIVE              Physical                      VITALS:  /87   Pulse 69   Temp 98.3 °F (36.8 °C) (Oral)   Resp 17   Ht 6' (1.829 m)   Wt 264 lb 12.4 oz (120.1 kg)   SpO2 97%   BMI 35.91 kg/m²                     MUSCULOSKELETAL:  right foot NVI. Wiggles toes to command. Pedal pulses are palpable. NEUROLOGIC:                                  Sensory:  Touch:  Right Lower Extremity:  normal                                                 Surgical wound appears saturated with serous more than sanguinous drainage on ABD pad and ACE, Pt reports dressing not changed since yesterday AM . Dressing change per me at this time. Cleansed wound with rubbing alcohol . Sutures intact, wound approximated . No right knee erythema or warmth but notable moderate swelling. Immobilizer replaced on pt after dressing.      Data       CBC:   Lab Results   Component Value Date    WBC 12.6 01/23/2020    RBC 4.07 01/23/2020    RBC 4.94 01/07/2015    HGB 12.2 01/23/2020    HCT 37.9 01/23/2020    MCV 93.1 01/23/2020    MCH 30.1 01/23/2020    MCHC 32.3 01/23/2020    RDW 14.2 01/23/2020     01/23/2020    MPV 8.9 01/23/2020        WBC:    Lab Results   Component Value Date    WBC 12.6 01/23/2020        Hemoglobin/Hematocrit:    Lab Results   Component Value Date    HGB 12.2 01/23/2020    HCT 37.9 01/23/2020        PT/INR:    Lab Results   Component Value Date    PROTIME 12.8 12/03/2019    INR 1.10 12/03/2019              Current Inpatient Medications             Current Facility-Administered Medications: ketorolac (TORADOL) injection 15 mg, 15 mg, Intravenous, Once  pantoprazole (PROTONIX) tablet 40 mg, 40 mg, Oral, BID AC  atorvastatin (LIPITOR) tablet 40 mg, 40 mg, Oral, Nightly  calcium-vitamin D 500-200 MG-UNIT per tablet 1 tablet, 1 tablet, Oral, BID  dicyclomine (BENTYL) tablet 40 mg, 40 mg, Oral, BID PRN  FLUoxetine (PROZAC) capsule 20 mg, 20 mg, Oral, Daily  multivitamin 1 tablet, 1 tablet, Oral, Daily  traZODone (DESYREL) tablet 200 mg, 200 mg, Oral, Nightly  zolpidem (AMBIEN) tablet 10 mg, 10 mg, Oral, Nightly PRN  0.9 % sodium chloride infusion, , Intravenous, Continuous  sodium chloride flush 0.9 % injection 10 mL, 10 mL, Intravenous, 2 times per day  sodium chloride flush 0.9 % injection 10 mL, 10 mL, Intravenous, PRN  acetaminophen (TYLENOL) tablet 650 mg, 650 mg, Oral, Q6H  HYDROmorphone (DILAUDID) injection 0.25 mg, 0.25 mg, Intravenous, Q3H PRN **OR** HYDROmorphone (DILAUDID) injection 0.5 mg, 0.5 mg, Intravenous, Q3H PRN  docusate sodium (COLACE) capsule 100 mg, 100 mg, Oral, BID  sennosides-docusate sodium (SENOKOT-S) 8.6-50 MG tablet 1 tablet, 1 tablet, Oral, BID  magnesium hydroxide (MILK OF MAGNESIA) 400 MG/5ML suspension 30 mL, 30 mL, Oral, Daily PRN  ondansetron (ZOFRAN) injection 4 mg, 4 mg, Intravenous, Q6H PRN  insulin lispro (HUMALOG) injection vial 0-12 Units, 0-12 Units, Subcutaneous, TID WC  insulin lispro (HUMALOG) injection vial 0-6 Units, 0-6 Units, Subcutaneous, Nightly  glucose (GLUTOSE) 40 % oral gel 15 g, 15 g, Oral, PRN  dextrose 50 % IV solution, 12.5 g, Intravenous, PRN  glucagon (rDNA) injection 1 mg, 1 mg, Intramuscular, PRN  dextrose 5 % solution, 100 mL/hr, Intravenous, PRN  oxyCODONE (ROXICODONE) immediate release tablet 5 mg, 5 mg, Oral, Q4H PRN **OR** oxyCODONE HCl (OXY-IR) immediate release tablet 10 mg, 10 mg, Oral, Q4H PRN  enoxaparin (LOVENOX) injection 40 mg, 40 mg, Subcutaneous, Daily  cefepime (MAXIPIME) 2 g IVPB minibag, 2 g, Intravenous, Q12H  vancomycin (VANCOCIN) 1500 mg in dextrose 5 % 250 mL IVPB, 1,500 mg, Intravenous, Q12H  vancomycin (VANCOCIN) intermittent dosing (placeholder), , Other, RX Placeholder    ASSESSMENT AND PLAN    Right TKA Dr Fallon Child on 12/17/19  Right knee drainage reported to me on call last week and he was directed to ER, sent home and saw Bandar JOAQUIN in office the following day. He noted more pain this week and was seen by Bandar JOAQUIN again on 1/20/20 who consulted Dr David Trujillo for evaluation. Plan was for I and D and poyethylene space exchange, This was done yesterday with intraop cx sent and pending  WBAT right knee in immobilizer all times unless in bed or bathing, keep right knee fully extended all times. DVT prophylaxis ordered, Lovenox as inpt then switch to ASA 81mg twice at day for 14 days for DVT prophylaxis  PT OT for ADL's and ambulation as tolerated  SS for DC planning  ID consulted  Cell count right knee drainage from OR normal, await final cx. ? Needs PICC line or home with BID dressing change and oral antibx. Plan per ID, appreciate help. IV or PO pain med as ordered. Added Toradol again today, helped with pain and swelling both.        Russell Talamantes  1/24/2020  9:46 AM

## 2020-01-24 NOTE — CARE COORDINATION
Referral received to check IV Benefits. Information faxed to [employer] office awaiting call back on costs. Therapy: Vanc. 2gms BID, Cefepime 2gms BID  Deductible:$ 300.00 Amt Met:$ 300.00 applied  Co-Insurance %: 80%/20%  OOP:$ 1,500.00 Amt Met:$ 491.00 applied    Pt.  Copay: $22.00/day Vanc. / $20.00/day Cefepime / $22.00/RN Visit until OOP met then 100% covered    Electronically signed by Johnathan Thakkar on 1/24/2020 at 3:15 PM   Cell Ph# [phone], Office # [phone]

## 2020-01-24 NOTE — PROGRESS NOTES
Informed pt per Dr Rossi Richard will need PICC line and pt could DC home today if Home Care and antibiotics are arranged. Pt states he has done this in past and is willing to do again.

## 2020-01-25 VITALS
HEIGHT: 72 IN | RESPIRATION RATE: 16 BRPM | SYSTOLIC BLOOD PRESSURE: 85 MMHG | HEART RATE: 70 BPM | BODY MASS INDEX: 35.83 KG/M2 | DIASTOLIC BLOOD PRESSURE: 49 MMHG | TEMPERATURE: 97.6 F | OXYGEN SATURATION: 95 % | WEIGHT: 264.55 LBS

## 2020-01-25 LAB
ANION GAP SERPL CALCULATED.3IONS-SCNC: 10 MMOL/L (ref 3–16)
BUN BLDV-MCNC: 8 MG/DL (ref 7–20)
CALCIUM SERPL-MCNC: 8.5 MG/DL (ref 8.3–10.6)
CHLORIDE BLD-SCNC: 103 MMOL/L (ref 99–110)
CO2: 25 MMOL/L (ref 21–32)
CREAT SERPL-MCNC: 0.9 MG/DL (ref 0.9–1.3)
GFR AFRICAN AMERICAN: >60
GFR NON-AFRICAN AMERICAN: >60
GLUCOSE BLD-MCNC: 144 MG/DL (ref 70–99)
GLUCOSE BLD-MCNC: 163 MG/DL (ref 70–99)
GLUCOSE BLD-MCNC: 89 MG/DL (ref 70–99)
GLUCOSE BLD-MCNC: 89 MG/DL (ref 70–99)
GLUCOSE BLD-MCNC: 95 MG/DL (ref 70–99)
PERFORMED ON: ABNORMAL
PERFORMED ON: ABNORMAL
PERFORMED ON: NORMAL
PERFORMED ON: NORMAL
POTASSIUM SERPL-SCNC: 4.5 MMOL/L (ref 3.5–5.1)
SODIUM BLD-SCNC: 138 MMOL/L (ref 136–145)
VANCOMYCIN TROUGH: 20 UG/ML (ref 10–20)
VANCOMYCIN TROUGH: ABNORMAL UG/ML (ref 10–20)

## 2020-01-25 PROCEDURE — 6360000002 HC RX W HCPCS: Performed by: ORTHOPAEDIC SURGERY

## 2020-01-25 PROCEDURE — 6370000000 HC RX 637 (ALT 250 FOR IP): Performed by: NURSE PRACTITIONER

## 2020-01-25 PROCEDURE — 6370000000 HC RX 637 (ALT 250 FOR IP): Performed by: ORTHOPAEDIC SURGERY

## 2020-01-25 PROCEDURE — 2580000003 HC RX 258: Performed by: INTERNAL MEDICINE

## 2020-01-25 PROCEDURE — 6360000002 HC RX W HCPCS: Performed by: INTERNAL MEDICINE

## 2020-01-25 PROCEDURE — 80048 BASIC METABOLIC PNL TOTAL CA: CPT

## 2020-01-25 PROCEDURE — 2580000003 HC RX 258: Performed by: ORTHOPAEDIC SURGERY

## 2020-01-25 PROCEDURE — 36415 COLL VENOUS BLD VENIPUNCTURE: CPT

## 2020-01-25 PROCEDURE — 36592 COLLECT BLOOD FROM PICC: CPT

## 2020-01-25 PROCEDURE — 99233 SBSQ HOSP IP/OBS HIGH 50: CPT | Performed by: INTERNAL MEDICINE

## 2020-01-25 PROCEDURE — 94660 CPAP INITIATION&MGMT: CPT

## 2020-01-25 PROCEDURE — 80202 ASSAY OF VANCOMYCIN: CPT

## 2020-01-25 PROCEDURE — 94760 N-INVAS EAR/PLS OXIMETRY 1: CPT

## 2020-01-25 RX ORDER — CIPROFLOXACIN 500 MG/1
500 TABLET, FILM COATED ORAL EVERY 12 HOURS SCHEDULED
Qty: 40 TABLET | Refills: 0 | Status: SHIPPED | OUTPATIENT
Start: 2020-01-25 | End: 2020-02-14

## 2020-01-25 RX ORDER — CIPROFLOXACIN 500 MG/1
500 TABLET, FILM COATED ORAL EVERY 12 HOURS SCHEDULED
Status: DISCONTINUED | OUTPATIENT
Start: 2020-01-25 | End: 2020-01-25 | Stop reason: HOSPADM

## 2020-01-25 RX ORDER — OXYCODONE HYDROCHLORIDE 5 MG/1
5 TABLET ORAL EVERY 6 HOURS PRN
Qty: 20 TABLET | Refills: 0 | Status: SHIPPED | OUTPATIENT
Start: 2020-01-25 | End: 2020-01-29 | Stop reason: SDUPTHER

## 2020-01-25 RX ADMIN — HYDROMORPHONE HYDROCHLORIDE 0.5 MG: 1 INJECTION, SOLUTION INTRAMUSCULAR; INTRAVENOUS; SUBCUTANEOUS at 12:18

## 2020-01-25 RX ADMIN — HYDROMORPHONE HYDROCHLORIDE 0.5 MG: 1 INJECTION, SOLUTION INTRAMUSCULAR; INTRAVENOUS; SUBCUTANEOUS at 15:14

## 2020-01-25 RX ADMIN — THERA TABS 1 TABLET: TAB at 08:42

## 2020-01-25 RX ADMIN — INSULIN LISPRO 2 UNITS: 100 INJECTION, SOLUTION INTRAVENOUS; SUBCUTANEOUS at 13:26

## 2020-01-25 RX ADMIN — VANCOMYCIN HYDROCHLORIDE 1250 MG: 10 INJECTION, POWDER, LYOPHILIZED, FOR SOLUTION INTRAVENOUS at 15:45

## 2020-01-25 RX ADMIN — ACETAMINOPHEN 650 MG: 325 TABLET ORAL at 11:04

## 2020-01-25 RX ADMIN — SODIUM CHLORIDE, PRESERVATIVE FREE 10 ML: 5 INJECTION INTRAVENOUS at 08:46

## 2020-01-25 RX ADMIN — HYDROMORPHONE HYDROCHLORIDE 0.5 MG: 1 INJECTION, SOLUTION INTRAMUSCULAR; INTRAVENOUS; SUBCUTANEOUS at 05:23

## 2020-01-25 RX ADMIN — ALTEPLASE 1 MG: 2.2 INJECTION, POWDER, LYOPHILIZED, FOR SOLUTION INTRAVENOUS at 13:25

## 2020-01-25 RX ADMIN — Medication 10 ML: at 15:14

## 2020-01-25 RX ADMIN — SENNOSIDES AND DOCUSATE SODIUM 1 TABLET: 8.6; 5 TABLET ORAL at 08:42

## 2020-01-25 RX ADMIN — ENOXAPARIN SODIUM 40 MG: 40 INJECTION SUBCUTANEOUS at 08:42

## 2020-01-25 RX ADMIN — PANTOPRAZOLE SODIUM 40 MG: 40 TABLET, DELAYED RELEASE ORAL at 05:23

## 2020-01-25 RX ADMIN — ACETAMINOPHEN 650 MG: 325 TABLET ORAL at 05:23

## 2020-01-25 RX ADMIN — FLUOXETINE 20 MG: 20 CAPSULE ORAL at 08:42

## 2020-01-25 RX ADMIN — Medication 10 ML: at 12:18

## 2020-01-25 RX ADMIN — OXYCODONE HYDROCHLORIDE 10 MG: 10 TABLET ORAL at 10:10

## 2020-01-25 RX ADMIN — CEFEPIME HYDROCHLORIDE 2 G: 2 INJECTION, POWDER, FOR SOLUTION INTRAVENOUS at 06:50

## 2020-01-25 RX ADMIN — OXYCODONE HYDROCHLORIDE 10 MG: 10 TABLET ORAL at 13:26

## 2020-01-25 RX ADMIN — HYDROMORPHONE HYDROCHLORIDE 0.5 MG: 1 INJECTION, SOLUTION INTRAMUSCULAR; INTRAVENOUS; SUBCUTANEOUS at 08:42

## 2020-01-25 RX ADMIN — OYSTER SHELL CALCIUM WITH VITAMIN D 1 TABLET: 500; 200 TABLET, FILM COATED ORAL at 08:42

## 2020-01-25 RX ADMIN — DOCUSATE SODIUM 100 MG: 100 CAPSULE, LIQUID FILLED ORAL at 08:42

## 2020-01-25 ASSESSMENT — PAIN SCALES - GENERAL
PAINLEVEL_OUTOF10: 7
PAINLEVEL_OUTOF10: 8
PAINLEVEL_OUTOF10: 8
PAINLEVEL_OUTOF10: 7
PAINLEVEL_OUTOF10: 5
PAINLEVEL_OUTOF10: 7

## 2020-01-25 ASSESSMENT — PAIN DESCRIPTION - PAIN TYPE
TYPE: SURGICAL PAIN
TYPE: ACUTE PAIN;SURGICAL PAIN
TYPE: SURGICAL PAIN

## 2020-01-25 ASSESSMENT — PAIN DESCRIPTION - FREQUENCY
FREQUENCY: CONTINUOUS

## 2020-01-25 ASSESSMENT — PAIN DESCRIPTION - ORIENTATION
ORIENTATION: RIGHT

## 2020-01-25 ASSESSMENT — PAIN DESCRIPTION - LOCATION
LOCATION: KNEE

## 2020-01-25 ASSESSMENT — ENCOUNTER SYMPTOMS
CONSTIPATION: 0
EYE REDNESS: 0
BACK PAIN: 0
DIARRHEA: 0
SHORTNESS OF BREATH: 0
COUGH: 0
ABDOMINAL PAIN: 0
WHEEZING: 0
NAUSEA: 0
RHINORRHEA: 0
TROUBLE SWALLOWING: 0
EYE DISCHARGE: 0
SORE THROAT: 0

## 2020-01-25 ASSESSMENT — PAIN DESCRIPTION - DESCRIPTORS
DESCRIPTORS: ACHING

## 2020-01-25 ASSESSMENT — PAIN DESCRIPTION - ONSET
ONSET: ON-GOING

## 2020-01-25 ASSESSMENT — PAIN - FUNCTIONAL ASSESSMENT
PAIN_FUNCTIONAL_ASSESSMENT: PREVENTS OR INTERFERES SOME ACTIVE ACTIVITIES AND ADLS

## 2020-01-25 ASSESSMENT — PAIN DESCRIPTION - DIRECTION: RADIATING_TOWARDS: RIGHT

## 2020-01-25 ASSESSMENT — PAIN DESCRIPTION - PROGRESSION
CLINICAL_PROGRESSION: GRADUALLY IMPROVING
CLINICAL_PROGRESSION: NOT CHANGED
CLINICAL_PROGRESSION: GRADUALLY IMPROVING
CLINICAL_PROGRESSION: GRADUALLY WORSENING
CLINICAL_PROGRESSION: NOT CHANGED
CLINICAL_PROGRESSION: GRADUALLY WORSENING
CLINICAL_PROGRESSION: NOT CHANGED

## 2020-01-25 ASSESSMENT — PAIN SCALES - WONG BAKER
WONGBAKER_NUMERICALRESPONSE: 0
WONGBAKER_NUMERICALRESPONSE: 0

## 2020-01-25 NOTE — PROGRESS NOTES
Spoke with Mary Cee pharmacist with [facility] via phone and verified home abx of vanco.   Electronically signed by Reynaldo Galeazzi, RN on 1/25/2020 at 5:56 PM

## 2020-01-25 NOTE — PROGRESS NOTES
Data- discharge order received, pt verbalized agreement to discharge, disposition to previous residence, with home care and home antibiotics. Action- discharge instructions prepared and given to Mr Emanuel Cannon, pt verbalized understanding. Medication information packet given r/t NEW and/or CHANGED prescriptions emphasizing name/purpose/side effects, pt verbalized understanding. Discharge instruction summary: Diet- constant carbohydrates, Activity- as tolerated, Primary Care Physician as follows: Shante Escobedo -[phone] f/u appointment with Dr Munira Rosario on Feb. 9th already made, prescription medications filled at his [place] [phone]. Inpatient surgical procedure precautions reviewed: how to care for his PICC line and how to care of his right knee incision, how to identify infection. Response- Pt belongings gathered,  Disposition is home  transported with home care and antibiotics, taken to lobby via w/c w/ transport, no complications.

## 2020-01-25 NOTE — PROGRESS NOTES
unspecified formulation 01/01/2007    Tdap (Boostrix, Adacel) 01/01/2007, 05/03/2014       Known drug allergies: All allergies were reviewed and updated    Allergies   Allergen Reactions    Nsaids Nausea Only and Other (See Comments)     Hx of Barretts esophagus      Prochlorperazine Other (See Comments)     No allergic reaction, patient reported sense of \"restlessness\" and fidgiting    Valtrex [Valacyclovir Hcl] Diarrhea    Morphine Rash    Morphine And Related Itching    Tolmetin Nausea Only     Hx of Barretts esophagus       Social history:     Social History:  All social andepidemiologic history was reviewed and updated by me today as needed. · Tobacco use:   reports that he quit smoking about 2 years ago. His smoking use included cigarettes. He has a 12.50 pack-year smoking history. He has never used smokeless tobacco.  · Alcohol use:   reports no history of alcohol use. · Currently lives in: [address] [place] [address]  ·  reports no history of drug use. Assessment:     The patient is a 51 y.o. old male who  has a past medical history of Alcoholism ([address] Allergic migraine with status migrainosus, Allergic rhinitis, seasonal, Anemia, Arthritis, Vaughn's esophagus, Benign intracranial hypertension, Cancer ([address] Carpal tunnel syndrome, Chronic pain, Depression, Depression, GERD (gastroesophageal reflux disease), Headache(784.0), History of blood transfusion, History of tobacco use, Migraine, Morbid obesity ([address] Movement disorder, Onychomycosis, Sleep apnea, obstructive, Unspecified cerebral artery occlusion with cerebral infarction (2014), Wears dentures, and Wears glasses.  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 · History of alcoholism-counseling done      Discussion:      The patient is on empiric IV vancomycin and IV cefepime. His surgical cultures are in process and there is no growth so far. He did have significant number of white cells and synovial fluid test and 3+ white cells on Gram stain. He had a PICC line placed in the right arm yesterday by orthopedics. Plan:     Diagnostic Workup:    · Continue to follow  fever curve, WBC count and blood cultures  · Follow up on liver and renal function    Antimicrobials:    · Will continue IV vancomycin 1.5 g every 12 hours  · Continue to monitor his vitals closely  · Will switch IV cefepime to p.o. ciprofloxacin 500 mg every 12 hours  · The patient insists to go home today. Will order 3 weeks of empiric antibiotics. The patient lives closer to [facility] and wants to follow-up with ID physician at [address] [place] [address] follow-up with Dr. Prieto Mackey in 3 weeks to determine the need for further antibiotics. · Continue to watch for any new fever. · Discussed all above with patient and RN  · Discussed with patient's wife at bedside    JEIMY has been updated with infusion orders as follows: Out-patient antibiotic therapy (OPAT)/ Antibiotic Infusion orders          Diagnosis: Right knee prosthetic joint infection status post debridement and hardware retention       Organism/ culture: In process     Name and dose of Antimicrobial: IV vancomycin 1.25 g every 12 hour, p.o. ciprofloxacin 500 mg every 12 hours     Antimicrobial start date: calculated from 1/25/2020     Antimicrobial completion date planned: 2/14/2020.   Further antibiotics will be per Dr. Prieto Mackey     PICC line: right arm     Lab monitoring: CBC, Chem 12, ESR, CRP, vancomycin trough once a week, to be       collected every Monday or Tuesday morning until Cardiovascular:      Rate and Rhythm: Normal rate and regular rhythm. Pulses: Normal pulses. Heart sounds: No murmur. No friction rub. Pulmonary:      Effort: Pulmonary effort is normal. No respiratory distress. Breath sounds: Normal breath sounds. No stridor. No wheezing, rhonchi or rales. Abdominal:      General: Bowel sounds are normal.      Palpations: Abdomen is soft. Tenderness: There is no abdominal tenderness. There is no right CVA tenderness, left CVA tenderness, guarding or rebound. Musculoskeletal: Normal range of motion. General: Tenderness (rt knee) present. No swelling. Lymphadenopathy:      Cervical: No cervical adenopathy. Skin:     General: Skin is warm and dry. Coloration: Skin is not jaundiced. Findings: No erythema or rash. Neurological:      General: No focal deficit present. Mental Status: He is alert and oriented to person, place, and time. Mental status is at baseline. Motor: No abnormal muscle tone. Psychiatric:         Mood and Affect: Mood normal.         Behavior: Behavior normal.         Thought Content: Thought content normal.            Lines: All vascular access sites are healthy with no local erythema, discharge or tenderness. Intake and output:    I/O last 3 completed shifts: In: 5 [P.O.:720]  Out: -     Lab Data:   All available labs and old records have been reviewed by me.     CBC:  Recent Labs     01/23/20  0505   WBC 12.6*   RBC 4.07*   HGB 12.2*   HCT 37.9*      MCV 93.1   MCH 30.1   MCHC 32.3   RDW 14.2        BMP:  Recent Labs     01/23/20  0505 01/24/20  0536 01/25/20  0500    135* 138   K 4.2 4.1 4.5    100 103   CO2 24 24 25   BUN 8 8 8   CREATININE 0.9 1.0 0.9   CALCIUM 8.8 8.6 8.5   GLUCOSE 127* 97 89        Hepatic Function Panel:   Lab Results   Component Value Date    ALKPHOS 93 08/31/2019    ALT 12 08/31/2019    AST 12 08/31/2019    PROT 6.6 08/31/2019    PROT 6.7 01/07/2015 BILITOT 0.3 08/31/2019    BILIDIR <0.2 02/18/2015    IBILI 0.3 02/18/2015    LABALBU 4.4 12/03/2019       CPK:   Lab Results   Component Value Date    CKTOTAL 65 06/18/2014     ESR:   Lab Results   Component Value Date    SEDRATE 9 01/22/2020     CRP:   Lab Results   Component Value Date    CRP 47.8 (H) 01/22/2020           Imaging: All pertinent images and reports for the current visit were reviewed by me during this visit. XR CHEST PORTABLE   Final Result   Satisfactory right upper extremity PICC line placement. Medications: All current and past medications were reviewed.      lidocaine 1 % injection  5 mL Intradermal Once    sodium chloride flush  10 mL Intravenous 2 times per day    vancomycin  1,500 mg Intravenous Q12H    pantoprazole  40 mg Oral BID AC    atorvastatin  40 mg Oral Nightly    calcium-vitamin D  1 tablet Oral BID    FLUoxetine  20 mg Oral Daily    multivitamin  1 tablet Oral Daily    traZODone  200 mg Oral Nightly    sodium chloride flush  10 mL Intravenous 2 times per day    acetaminophen  650 mg Oral Q6H    docusate sodium  100 mg Oral BID    sennosides-docusate sodium  1 tablet Oral BID    insulin lispro  0-12 Units Subcutaneous TID WC    insulin lispro  0-6 Units Subcutaneous Nightly    enoxaparin  40 mg Subcutaneous Daily    cefepime  2 g Intravenous Q12H    vancomycin (VANCOCIN) intermittent dosing (placeholder)   Other RX Placeholder        sodium chloride Stopped (01/23/20 2053)    dextrose         sodium chloride flush, dicyclomine, zolpidem, sodium chloride flush, HYDROmorphone **OR** HYDROmorphone, magnesium hydroxide, ondansetron, glucose, dextrose, glucagon (rDNA), dextrose, oxyCODONE **OR** oxyCODONE      Problem list:       Patient Active Problem List   Diagnosis Code    Insomnia-chronic intermittent--uses prn ambien--to be filled by dr Sumeet Gamboa (sleep dr) Gregoria Kennedy    Vaughn esophagus-on daily ppi-last egd 5/15--dr arce K22.70    History of tobacco useadvised to quit- quit 7/1/2014 Z87.891    Allergic rhinitis, seasonal J30.2    Obstructive sleep apnea,Severe -(on cpap) G47.33    Class 1 obesity due to excess calories with body mass index (BMI) of 34.0 to 34.9 in adult E66.09, Z68.34    Depression-on daily effexor xr--sees dr Khris Oakes F32.9    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 Z90.81    Chondromalacia of patellofemoral joint M22.40    Chronic pain syndrome G89.4    History of stroke Z86.73    Intracranial hypertension G93.2    ETOHism (HCC)--1/16--detoxed at [facility]--attending AA F10.20    Gastroesophageal reflux disease with esophagitis--on daily ppi K21.0    Intractable chronic migraine without aura and with status migrainosus G43.711    Iron deficiency anemia D50.9    B12 deficiency E53.8    Anastomotic ulcer K28.9    Malignant neoplasm of left kidney ([facility] 8/1/18 Dr Ingrid Hudson. C64.2    Malignant neoplasm of left kidney (HCC) clear cell. 8/1/18 Dr Ingrid Hudson. C64.2    Instability of knee joint, right M25.361    Total knee replacement status, right Z96.651    Failed total knee, right, initial encounter ([facility] T84.012A    Infection of total knee replacement (HCC) T84.59XA, Z96.659    Smoker F17.200    History of depression Z86.59    History of [facility] ([facility] F10.21    Weight loss counseling, encounter for Z71.3    Tobacco abuse counseling Z71.6       Please note that this chart was generated using Dragon dictation software. Although every effort was made to ensure the accuracy of this automated transcription, some errors in transcription may have occurred inadvertently. If you may need any clarification, please do not hesitate to contact me through EPIC or at the phone number provided below with my electronic signature.   Any pictures or media included in this note were obtained after taking informed verbal consent from the patient and with their approval to

## 2020-01-25 NOTE — PROGRESS NOTES
mg, Oral, Nightly  zolpidem (AMBIEN) tablet 10 mg, 10 mg, Oral, Nightly PRN  0.9 % sodium chloride infusion, , Intravenous, Continuous  sodium chloride flush 0.9 % injection 10 mL, 10 mL, Intravenous, 2 times per day  sodium chloride flush 0.9 % injection 10 mL, 10 mL, Intravenous, PRN  acetaminophen (TYLENOL) tablet 650 mg, 650 mg, Oral, Q6H  HYDROmorphone (DILAUDID) injection 0.25 mg, 0.25 mg, Intravenous, Q3H PRN **OR** HYDROmorphone (DILAUDID) injection 0.5 mg, 0.5 mg, Intravenous, Q3H PRN  docusate sodium (COLACE) capsule 100 mg, 100 mg, Oral, BID  sennosides-docusate sodium (SENOKOT-S) 8.6-50 MG tablet 1 tablet, 1 tablet, Oral, BID  magnesium hydroxide (MILK OF MAGNESIA) 400 MG/5ML suspension 30 mL, 30 mL, Oral, Daily PRN  ondansetron (ZOFRAN) injection 4 mg, 4 mg, Intravenous, Q6H PRN  insulin lispro (HUMALOG) injection vial 0-12 Units, 0-12 Units, Subcutaneous, TID WC  insulin lispro (HUMALOG) injection vial 0-6 Units, 0-6 Units, Subcutaneous, Nightly  glucose (GLUTOSE) 40 % oral gel 15 g, 15 g, Oral, PRN  dextrose 50 % IV solution, 12.5 g, Intravenous, PRN  glucagon (rDNA) injection 1 mg, 1 mg, Intramuscular, PRN  dextrose 5 % solution, 100 mL/hr, Intravenous, PRN  oxyCODONE (ROXICODONE) immediate release tablet 5 mg, 5 mg, Oral, Q4H PRN **OR** oxyCODONE HCl (OXY-IR) immediate release tablet 10 mg, 10 mg, Oral, Q4H PRN  enoxaparin (LOVENOX) injection 40 mg, 40 mg, Subcutaneous, Daily  cefepime (MAXIPIME) 2 g IVPB minibag, 2 g, Intravenous, Q12H  vancomycin (VANCOCIN) intermittent dosing (placeholder), , Other, RX Placeholder    ASSESSMENT AND PLAN    Right TKA Dr Reanna Serra on 12/17/19  Right knee drainage reported to me on call last week and he was directed to ER, sent home and saw Puja JOAQUIN in office the following day. He noted more pain this week and was seen by Puja JOAQUIN again on 1/20/20 who consulted Dr Haider Jimenez for evaluation.  Plan was for I and D and poyethylene space exchange, This was done 1/22/20

## 2020-01-25 NOTE — PROGRESS NOTES
Clinical Pharmacy Note  Vancomycin Consult    Robyn Romano is a 51 y.o. male ordered Vancomycin for knee infection; consult received from Dr. Kathy Ramirez to manage therapy. Also receiving Ciprofloxacin. 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  Chondromalacia of patellofemoral joint    Chronic pain syndrome    History of stroke    Intracranial hypertension    ETOHism (HCC)----detoxed at [facility]--attending AA    Gastroesophageal reflux disease with esophagitis--on daily ppi    Intractable chronic migraine without aura and with status migrainosus    Iron deficiency anemia    B12 deficiency    Anastomotic ulcer    Malignant neoplasm of left kidney ([facility] 18 Dr Debra Krishna.  Malignant neoplasm of left kidney (HCC) clear cell. 18 Dr Debar Krishna.  Instability of knee joint, right    Total knee replacement status, right    Failed total knee, right, initial encounter ([facility]    Infection of total knee replacement ([facility]    Smoker    History of depression    History of alcoholism ([facility]    Complex care coordination    Tobacco abuse counseling    Receiving intravenous antibiotic treatment as outpatient       Allergies:  Nsaids; Prochlorperazine; Valtrex [valacyclovir hcl];  Morphine; Morphine and related; and Tolmetin     Temp max:  Temp (24hrs), Av.2 °F (36.8 °C), Min:97.6 °F (36.4 °C), Max:98.6 °F (37 °C)      Recent Labs     20  0505   WBC 12.6*       Recent Labs 01/23/20  0505 01/24/20  0536 01/25/20  0500   BUN 8 8 8   CREATININE 0.9 1.0 0.9         Intake/Output Summary (Last 24 hours) at 1/25/2020 1351  Last data filed at 1/25/2020 1226  Gross per 24 hour   Intake 1090 ml   Output --   Net 1090 ml       Culture Results:      Ht Readings from Last 1 Encounters:   01/23/20 6' (1.829 m)        Wt Readings from Last 1 Encounters:   01/25/20 264 lb 8.8 oz (120 kg)         Estimated Creatinine Clearance: 134 mL/min (based on SCr of 0.9 mg/dL). Assessment/Plan:  Vancomycin day 5  Vancomycin trough 20 mg/L on 1500 mg q12h (timing accurate)  Will reduce  Vancomycin to  1250 mg IV every 12 hours. Regimen projects a trough level of 15-20 mg/L. Will repeat trough prior to 1500 dose 1-26  Thank you for the consult. Will continue to follow.   Shankar Atkinson RPh 1/25/2020 1:55 PM

## 2020-01-25 NOTE — CARE COORDINATION
DISCHARGE SUMMARY     DATE OF DISCHARGE: 1/25/2020    DISCHARGE DESTINATION: Home with family    · Discharging to Facility/ Agency   · Name:  [facility] care [facility])           · Address: [address] [place] [address]., [address]  · Phone: [phone]  · Fax: [phone]    HOME INFUSION PHARMACY:  · Name:     Jeannie Shelton  · Address: [facility]. [facility]. [address]  · Phone:    [phone]  · Fax:        [phone]     TRANSPORTATION:  Patient's family will transport him home. COMMENTS:   TRISTAN faxed home care needs orders to [facility] and called and spoke to intake. TRISTAN called [facility] to inform that patient was discharging today and last dose was at 3pm today. TRISTAN was informed that the patient will need a separate handwritten order from the MD with signature because esigning was not accepted by the pharmacy board to their office. TRISTAN notified RN     Respectfully submitted,    Julieta BENITES, [facility]   [phone]

## 2020-01-27 ENCOUNTER — APPOINTMENT (OUTPATIENT)
Dept: PHYSICAL THERAPY | Age: 50
End: 2020-01-27
Payer: COMMERCIAL

## 2020-01-27 ENCOUNTER — CARE COORDINATION (OUTPATIENT)
Dept: CASE MANAGEMENT | Age: 50
End: 2020-01-27

## 2020-01-27 LAB
ALBUMIN SERPL-MCNC: NORMAL G/DL
ALP BLD-CCNC: NORMAL IU/L
ALT SERPL-CCNC: NORMAL IU/L
ANAEROBIC CULTURE: NORMAL
ANION GAP SERPL CALCULATED.3IONS-SCNC: NORMAL MMOL/L
AST SERPL-CCNC: NORMAL IU/L
BUN BLDV-MCNC: NORMAL MG/DL
C-REACTIVE PROTEIN WIDE RANGE: NORMAL MG/L
CALCIUM SERPL-MCNC: NORMAL MG/DL
CHLORIDE BLD-SCNC: NORMAL MEQ/L
CO2: NORMAL MMOL/L
CREAT SERPL-MCNC: NORMAL MG/DL
CULTURE SURGICAL: NORMAL
GFR AFRICAN AMERICAN: NORMAL ML/MIN/1.73 M2
GFR MDRD NON AF AMER: NORMAL ML/MIN/1.73 M2
GLUCOSE BLD-MCNC: NORMAL MG/DL
GRAM STAIN RESULT: NORMAL
HCT VFR BLD CALC: 37.8 % (ref 40–50)
HEMOGLOBIN: 12.2 G/DL (ref 13.5–16.5)
MCH RBC QN AUTO: 30.4 PG (ref 27–33)
MCHC RBC AUTO-ENTMCNC: 32.4 G/DL (ref 32–36)
MCV RBC AUTO: 93.7 FL (ref 82–97)
PDW BLD-RTO: 13.5 %
PLATELET # BLD: 382 THOU/MCL (ref 140–375)
PMV BLD AUTO: 9.4 FL (ref 7.4–11.5)
POTASSIUM SERPL-SCNC: NORMAL MEQ/L
RBC # BLD: 4.03 MIL/MCL (ref 4.4–5.8)
SEDIMENTATION RATE, ERYTHROCYTE: 20 MM/HR (ref 0–15)
SODIUM BLD-SCNC: NORMAL MEQ/L
TOTAL BILIRUBIN: NORMAL MG/DL
TOTAL PROTEIN: NORMAL G/DL
VANCOMYCIN TROUGH: NORMAL MCG/ML
WBC # BLD: 8.1 THOU/MCL (ref 3.6–10.5)

## 2020-01-27 NOTE — CARE COORDINATION
Janay 45 Transitions Initial Follow Up Call    Call within 2 business days of discharge: Yes    Patient: Bob Dodge Patient : 1970   MRN: [id number]  Reason for Admission: infection of total knee replacement  Discharge Date: 20 RARS: Readmission Risk Score: 26      Last Discharge [facility]       Complaint Diagnosis Description Type Department Provider    20   Admission (Discharged) [address] [facility] [place], [place]               Facility: [facility]    Non-face-to-face services provided:  Obtained and reviewed discharge summary and/or continuity of care documents  Communication with home health agencies or other community services the patient is currently [facility]    Care Transitions 24 Hour Call    Do you have all of your prescriptions and are they filled?:  Yes  Post Acute Services:  Home Health (Comment: Ifeanyi Lopez [address] [phone])  Care Transitions Interventions                             Attempted to reach patient via phone for initial post hospital transition call. HIPAA compliant VM left stating purpose of call along with my contact information requesting a return call. Writer spoke with [facility] liaison who could verify that home care and IV antibiotics were started yesterday.         Follow Up  Future Appointments   Date Time Provider Timothy Beal   2020  9:15 AM MD KATIE Grijalva ORTHO DAR   3/6/2020 10:20 AM LILLIE Harper - CNP FF SLEEP MED DAR Avalos RN

## 2020-01-28 ENCOUNTER — CARE COORDINATION (OUTPATIENT)
Dept: CASE MANAGEMENT | Age: 50
End: 2020-01-28

## 2020-01-28 LAB
ALBUMIN SERPL-MCNC: 3.3 G/DL (ref 3.5–5)
ALP BLD-CCNC: 98 IU/L (ref 35–135)
ALT SERPL-CCNC: 12 IU/L (ref 10–60)
ANION GAP SERPL CALCULATED.3IONS-SCNC: 10 MMOL/L (ref 6–18)
AST SERPL-CCNC: 14 IU/L (ref 10–40)
BASOPHILS ABSOLUTE: 0 %
BASOPHILS ABSOLUTE: 0 THOU/MCL (ref 0–0.2)
BILIRUB SERPL-MCNC: 0.6 MG/DL (ref 0–1.2)
BUN BLDV-MCNC: 8 MG/DL (ref 8–26)
C-REACTIVE PROTEIN WIDE RANGE: 21.2 MG/L
CALCIUM SERPL-MCNC: 8.7 MG/DL (ref 8.5–10.5)
CHLORIDE BLD-SCNC: 101 MEQ/L (ref 101–111)
CO2: 24 MMOL/L (ref 24–36)
CREAT SERPL-MCNC: 1.09 MG/DL (ref 0.64–1.27)
EOSINOPHILS ABSOLUTE: 0.6 THOU/MCL (ref 0.03–0.45)
EOSINOPHILS RELATIVE PERCENT: 7 %
GFR AFRICAN AMERICAN: 91 ML/MIN/1.73 M2
GFR NON-AFRICAN AMERICAN: 79 ML/MIN/1.73 M2
GLUCOSE BLD-MCNC: 94 MG/DL (ref 70–99)
HCT VFR BLD CALC: 36.7 % (ref 40–50)
HEMOGLOBIN: 12 G/DL (ref 13.5–16.5)
LYMPHOCYTES ABSOLUTE: 2.7 THOU/MCL (ref 1–4)
LYMPHOCYTES RELATIVE PERCENT: 33 %
MCH RBC QN AUTO: 30.5 PG (ref 27–33)
MCHC RBC AUTO-ENTMCNC: 32.6 G/DL (ref 32–36)
MCV RBC AUTO: 93.5 FL (ref 82–97)
MONOCYTES # BLD: 9 %
MONOCYTES ABSOLUTE: 0.8 THOU/MCL (ref 0.2–0.9)
NEUTROPHILS ABSOLUTE: 4 THOU/MCL (ref 1.8–7.7)
PDW BLD-RTO: 12.8 %
PLATELET # BLD: 384 THOU/MCL (ref 140–375)
PMV BLD AUTO: 9.4 FL (ref 7.4–11.5)
POTASSIUM SERPL-SCNC: 4.1 MEQ/L (ref 3.6–5.1)
RBC # BLD: 3.93 MIL/MCL (ref 4.4–5.8)
SEDIMENTATION RATE, ERYTHROCYTE: 24 MM/HR (ref 0–15)
SEG NEUTROPHILS: 51 %
SODIUM BLD-SCNC: 135 MEQ/L (ref 135–145)
TOTAL PROTEIN: 5.6 G/DL (ref 6–8)
VANCOMYCIN TROUGH: 14.7 MCG/ML (ref 10–20)
WBC # BLD: 8.1 THOU/MCL (ref 3.6–10.5)

## 2020-01-29 ENCOUNTER — APPOINTMENT (OUTPATIENT)
Dept: PHYSICAL THERAPY | Age: 50
End: 2020-01-29
Payer: COMMERCIAL

## 2020-01-29 RX ORDER — OXYCODONE HYDROCHLORIDE 5 MG/1
5 TABLET ORAL EVERY 6 HOURS PRN
Qty: 20 TABLET | Refills: 0 | Status: SHIPPED | OUTPATIENT
Start: 2020-01-29 | End: 2020-02-03 | Stop reason: SDUPTHER

## 2020-01-31 ENCOUNTER — TELEPHONE (OUTPATIENT)
Dept: ORTHOPEDIC SURGERY | Age: 50
End: 2020-01-31

## 2020-01-31 ENCOUNTER — APPOINTMENT (OUTPATIENT)
Dept: PHYSICAL THERAPY | Age: 50
End: 2020-01-31
Payer: COMMERCIAL

## 2020-01-31 NOTE — TELEPHONE ENCOUNTER
[1/31/2020 9:49 AM]    Good morning Lena! [1/31/2020 9:50 AM]  Michela CHRISTY:    Good Morning  (sun)     [1/31/2020 9:50 AM]    Are you available to speak to a nurse from [facility] in regards to a pt who had Sx 1/22 ?   She has concerns about pt's knee     [1/31/2020 9:52 AM]    spongey to the touch sx site      Requesting a call back  # [phone]

## 2020-02-01 ENCOUNTER — HOSPITAL ENCOUNTER (EMERGENCY)
Age: 50
Discharge: HOME | End: 2020-02-02
Attending: EMERGENCY MEDICINE
Payer: COMMERCIAL

## 2020-02-01 ENCOUNTER — APPOINTMENT (OUTPATIENT)
Dept: GENERAL RADIOLOGY | Age: 50
End: 2020-02-01
Payer: COMMERCIAL

## 2020-02-01 VITALS
TEMPERATURE: 98.4 F | WEIGHT: 266.54 LBS | BODY MASS INDEX: 36.1 KG/M2 | SYSTOLIC BLOOD PRESSURE: 134 MMHG | OXYGEN SATURATION: 97 % | RESPIRATION RATE: 18 BRPM | HEART RATE: 67 BPM | DIASTOLIC BLOOD PRESSURE: 70 MMHG | HEIGHT: 72 IN

## 2020-02-01 LAB
ANION GAP SERPL CALCULATED.3IONS-SCNC: 10 MMOL/L (ref 3–16)
BASOPHILS ABSOLUTE: 0.1 K/UL (ref 0–0.2)
BASOPHILS RELATIVE PERCENT: 1.4 %
BUN BLDV-MCNC: 8 MG/DL (ref 7–20)
CALCIUM SERPL-MCNC: 9.2 MG/DL (ref 8.3–10.6)
CHLORIDE BLD-SCNC: 106 MMOL/L (ref 99–110)
CO2: 26 MMOL/L (ref 21–32)
CREAT SERPL-MCNC: 1.1 MG/DL (ref 0.9–1.3)
EOSINOPHILS ABSOLUTE: 0.4 K/UL (ref 0–0.6)
EOSINOPHILS RELATIVE PERCENT: 3.8 %
GFR AFRICAN AMERICAN: >60
GFR NON-AFRICAN AMERICAN: >60
GLUCOSE BLD-MCNC: 101 MG/DL (ref 70–99)
HCT VFR BLD CALC: 35.8 % (ref 40.5–52.5)
HEMOGLOBIN: 11.7 G/DL (ref 13.5–17.5)
LACTIC ACID: 0.9 MMOL/L (ref 0.4–2)
LYMPHOCYTES ABSOLUTE: 2.4 K/UL (ref 1–5.1)
LYMPHOCYTES RELATIVE PERCENT: 25.9 %
MCH RBC QN AUTO: 30.1 PG (ref 26–34)
MCHC RBC AUTO-ENTMCNC: 32.6 G/DL (ref 31–36)
MCV RBC AUTO: 92.1 FL (ref 80–100)
MONOCYTES ABSOLUTE: 0.9 K/UL (ref 0–1.3)
MONOCYTES RELATIVE PERCENT: 10 %
NEUTROPHILS ABSOLUTE: 5.6 K/UL (ref 1.7–7.7)
NEUTROPHILS RELATIVE PERCENT: 58.9 %
PDW BLD-RTO: 14.3 % (ref 12.4–15.4)
PLATELET # BLD: 399 K/UL (ref 135–450)
PMV BLD AUTO: 8.5 FL (ref 5–10.5)
POTASSIUM REFLEX MAGNESIUM: 4.2 MMOL/L (ref 3.5–5.1)
RBC # BLD: 3.89 M/UL (ref 4.2–5.9)
SODIUM BLD-SCNC: 142 MMOL/L (ref 136–145)
WBC # BLD: 9.4 K/UL (ref 4–11)

## 2020-02-01 PROCEDURE — 80048 BASIC METABOLIC PNL TOTAL CA: CPT

## 2020-02-01 PROCEDURE — 83605 ASSAY OF LACTIC ACID: CPT

## 2020-02-01 PROCEDURE — 36415 COLL VENOUS BLD VENIPUNCTURE: CPT

## 2020-02-01 PROCEDURE — 99283 EMERGENCY DEPT VISIT LOW MDM: CPT

## 2020-02-01 PROCEDURE — 73560 X-RAY EXAM OF KNEE 1 OR 2: CPT

## 2020-02-01 PROCEDURE — 6370000000 HC RX 637 (ALT 250 FOR IP): Performed by: EMERGENCY MEDICINE

## 2020-02-01 PROCEDURE — 85025 COMPLETE CBC W/AUTO DIFF WBC: CPT

## 2020-02-01 RX ORDER — OXYCODONE HYDROCHLORIDE AND ACETAMINOPHEN 5; 325 MG/1; MG/1
1 TABLET ORAL ONCE
Status: COMPLETED | OUTPATIENT
Start: 2020-02-01 | End: 2020-02-01

## 2020-02-01 RX ADMIN — OXYCODONE HYDROCHLORIDE AND ACETAMINOPHEN 1 TABLET: 5; 325 TABLET ORAL at 23:33

## 2020-02-01 ASSESSMENT — PAIN DESCRIPTION - PAIN TYPE: TYPE: ACUTE PAIN

## 2020-02-01 ASSESSMENT — PAIN DESCRIPTION - DESCRIPTORS: DESCRIPTORS: CONSTANT

## 2020-02-01 ASSESSMENT — PAIN DESCRIPTION - ONSET: ONSET: PROGRESSIVE

## 2020-02-01 ASSESSMENT — PAIN DESCRIPTION - ORIENTATION: ORIENTATION: RIGHT

## 2020-02-01 ASSESSMENT — PAIN DESCRIPTION - LOCATION: LOCATION: KNEE

## 2020-02-01 ASSESSMENT — PAIN SCALES - GENERAL
PAINLEVEL_OUTOF10: 8
PAINLEVEL_OUTOF10: 8

## 2020-02-01 ASSESSMENT — PAIN - FUNCTIONAL ASSESSMENT: PAIN_FUNCTIONAL_ASSESSMENT: PREVENTS OR INTERFERES SOME ACTIVE ACTIVITIES AND ADLS

## 2020-02-02 PROCEDURE — 6370000000 HC RX 637 (ALT 250 FOR IP): Performed by: EMERGENCY MEDICINE

## 2020-02-02 RX ORDER — OXYCODONE HYDROCHLORIDE AND ACETAMINOPHEN 5; 325 MG/1; MG/1
1 TABLET ORAL ONCE
Status: COMPLETED | OUTPATIENT
Start: 2020-02-02 | End: 2020-02-02

## 2020-02-02 RX ADMIN — OXYCODONE HYDROCHLORIDE AND ACETAMINOPHEN 1 TABLET: 5; 325 TABLET ORAL at 00:52

## 2020-02-02 ASSESSMENT — PAIN DESCRIPTION - DESCRIPTORS
DESCRIPTORS: THROBBING
DESCRIPTORS: DISCOMFORT

## 2020-02-02 ASSESSMENT — PAIN SCALES - GENERAL
PAINLEVEL_OUTOF10: 8

## 2020-02-02 ASSESSMENT — PAIN DESCRIPTION - LOCATION
LOCATION: KNEE
LOCATION: KNEE

## 2020-02-02 ASSESSMENT — PAIN DESCRIPTION - PROGRESSION
CLINICAL_PROGRESSION: NOT CHANGED
CLINICAL_PROGRESSION: NOT CHANGED

## 2020-02-02 ASSESSMENT — PAIN DESCRIPTION - ONSET
ONSET: ON-GOING
ONSET: ON-GOING

## 2020-02-02 ASSESSMENT — PAIN DESCRIPTION - ORIENTATION
ORIENTATION: RIGHT
ORIENTATION: RIGHT

## 2020-02-02 ASSESSMENT — PAIN DESCRIPTION - PAIN TYPE: TYPE: ACUTE PAIN;CHRONIC PAIN

## 2020-02-02 NOTE — ED PROVIDER NOTES
CHIEF COMPLAINT  Knee Pain (R knee pain/swelling 8/10 knee replacement correction surgery 10 days ago, wound felt hot to pt wife, with some drainage x30 minutes. 47 Pt states he had a revsion to a right knee surgery a week ago @ [place]. The pt believes he is having more drainage from his inscision site. Site is well approximated appears dried at present. Knee is swollen which the pt believes is more than usual. Pt has an area of light redness to the left side of incision site, inferior to patella, which feels slightly warmer than s)      HISTORY OF PRESENT ILLNESS  Shila Snyder is a 51 y.o. male who presents to the ED complaining of right knee pain, swelling and drainage that has been getting slightly worse ever since he had revision of his total knee replacement on the right knee 10 days ago. Patient states he had total knee replacement on the right 6 years ago and had some laxity in the hardware therefore had revision by Dr. Presley Myers. States that there was infection after his revision and he has been on antibiotics. Vanco infusions via PICC line as well as Cipro by mouth. Has been taking his antibiotics as prescribed. No fevers. No vomiting. States he does have some clear drainage coming from the incision at the bottom of it. Denies any trauma to the knee. No other complaints, modifying factors or associated symptoms. Nursing notes reviewed.    Past Medical History:   Diagnosis Date    Alcoholism [facility])     last drink 2015    Allergic migraine with status migrainosus     Allergic rhinitis, seasonal     Anemia     Arthritis     right knee    Vaughn's esophagus     Benign intracranial hypertension     Cancer (HCC)     renal     Carpal tunnel syndrome     Chronic pain     Depression     Depression     GERD (gastroesophageal reflux disease)     Headache(784.0)     History of blood transfusion     History of tobacco use     Quit 7/2014    Migraine     chronic for 1 year    Morbid obesity ([address]     Movement disorder     Onychomycosis     Sleep apnea, obstructive     Severe uses cpap stop bang 6    Unspecified cerebral artery occlusion with cerebral infarction 2014    slight weakness in left arm    Wears dentures     full set    Wears glasses      Past Surgical History:   Procedure Laterality Date    ABDOMEN SURGERY      gastric bypass    ABDOMEN SURGERY  4-7-2016    repair of recurrent incisional hernia with mesh, removal of old mesh, bilateral component separation    ABDOMINAL EXPLORATION SURGERY  1/11/16    exp lap, lysis of adhesions, small bowel resection    CARPAL TUNNEL RELEASE      bilat    DILATATION, ESOPHAGUS      ENDOSCOPY, COLON, DIAGNOSTIC      EYE SURGERY      GASTRIC BYPASS SURGERY  Jan 2009    Has lost about 200 pounds.  HERNIA REPAIR  3-    ventral    JOINT REPLACEMENT      KIDNEY REMOVAL Left 08/01/2018    KNEE ARTHROSCOPY Right 7/11/2013    Dr.Robert WaltersAdelina     KNEE ARTHROSCOPY Right 7/11/12    RIGHT KNEE ARTHROSCOPY WITH CHONDROPLASTY    KNEE SURGERY  July 2012    right, arthroscopy    LAPAROTOMY      LASIK  2005    OTHER SURGICAL HISTORY Left 03/08/2016    CT biopsy ablasion left kidney    OTHER SURGICAL HISTORY  2016    small intestine 12 inch removed, 2 hernia surgeries, another surgery to drain infection from incision.      REVISION TOTAL KNEE ARTHROPLASTY Right 12/17/2019    RIGHT REVISION TIBIA TOTAL KNEE REPLACEMENT performed by Joon Zhang MD at [facility] Right 1/22/2020    RIGHT KNEE IRRIGATION AND DEBRIDEMENT WITH POLY EXCHANGE [facility]    RIGHT    UPPER GASTROINTESTINAL ENDOSCOPY  6-7-2016    UPPER GASTROINTESTINAL ENDOSCOPY  04/02/2018     Family History   Problem Relation Age of Onset    Cancer Father         Lymphoma    Arthritis Mother     Dementia Other     Diabetes Other     Cancer Other     Diabetes Maternal Uncle     Heart Disease Maternal Uncle     Depression Other     Heart Disease Maternal Uncle      Social History     Socioeconomic History    Marital status:      Spouse name: King Parker Number of children: 2    Years of education: Not on file    Highest education level: Not on file   Occupational History    Occupation: [employer], [employer] his son.    Social Needs    Financial resource strain: Not on file    Food insecurity:     Worry: Not on file     Inability: Not on file    Transportation needs:     Medical: Not on file     Non-medical: Not on file   Tobacco Use    Smoking status: Former Smoker     Packs/day: 0.50     Years: 25.00     Pack years: 12.50     Types: Cigarettes     Last attempt to quit: 2017     Years since quittin.5    Smokeless tobacco: Never Used   Substance and Sexual Activity    Alcohol use: No     Alcohol/week: 0.0 standard drinks     Comment: last drink     Drug use: No    Sexual activity: Yes     Partners: Female     Comment: wife Jenifer Dobbins   Lifestyle    Physical activity:     Days per week: Not on file     Minutes per session: Not on file    Stress: Not on file   Relationships    Social connections:     Talks on phone: Not on file     Gets together: Not on file     Attends Anabaptism service: Not on file     Active member of club or organization: Not on file     Attends meetings of clubs or organizations: Not on file     Relationship status: Not on file    Intimate partner violence:     Fear of current or ex partner: Not on file     Emotionally abused: Not on file     Physically abused: Not on file     Forced sexual activity: Not on file   Other Topics Concern    Not on file   Social History Narrative    Not on file     Current Facility-Administered Medications   Medication Dose Route Frequency Provider Last Rate Last Dose    oxyCODONE-acetaminophen (PERCOCET) 5-325 MG per tablet 1 tablet  1 tablet Oral Once Ralph routine labs, lactic acid, x-ray of the right knee. Percocet for pain control. ED Course as of Feb 02 0041   Cari Pérez Feb 02, 2020   0006 Consult placed the patient's orthopedic surgeon, Dr. Brooke Colin, discussed patient's care plan. Patient's lab work unremarkable with normal lactic acid, normal white count. X-ray shows improved swelling from prior. [DS]   2585 Spoke with Dr. Roman Carrasco, orthopedic surgeon on-call for Dr. Brooke Colin, discussed patient's lab work and x-ray. No further recommendations at this point from orthopedic surgery. Recommends follow-up with Dr. Brooke Colin on Monday in the office. Patient updated on care plan. Agreeable. Return instructions provided. All questions answered. [DS]      ED Course User Index  [DS] Juanito Steward MD       I estimate there is LOW risk for CELLULITIS, COMPARTMENT SYNDROME, NECROTIZING FASCIITIS, TENDON OR NEUROVASCULAR INJURY, or FOREIGN BODY, thus I consider the discharge disposition reasonable. Also, there is no evidence or peritonitis, sepsis, or toxicity. Kristin Mathias and I have discussed the diagnosis and risks, and we agree with discharging home to follow-up with their primary doctor. We also discussed returning to the Emergency Department immediately if new or worsening symptoms occur. We have discussed the symptoms which are most concerning (e.g., changing or worsening pain, fever, numbness, weakness, cool or painful digits) that necessitate immediate return. Patient was given scripts for the following medications. I counseled patient how to take these medications. Current Discharge Medication List              CLINICAL IMPRESSION  1. Post-operative pain    2. Edema of right lower extremity        Blood pressure 134/70, pulse 67, temperature 98.4 °F (36.9 °C), temperature source Oral, resp. rate 18, height 6' (1.829 m), weight 266 lb 8.6 oz (120.9 kg), SpO2 97 %. DISPOSITION  Patient was discharged to home in good condition.     Delon Piedra

## 2020-02-03 ENCOUNTER — TELEPHONE (OUTPATIENT)
Dept: ORTHOPEDIC SURGERY | Age: 50
End: 2020-02-03

## 2020-02-03 LAB
ALBUMIN SERPL-MCNC: 3.2 G/DL (ref 3.5–5)
ALP BLD-CCNC: 103 IU/L (ref 35–135)
ALT SERPL-CCNC: 5 IU/L (ref 10–60)
ANION GAP SERPL CALCULATED.3IONS-SCNC: 6 MMOL/L (ref 6–18)
AST SERPL-CCNC: 15 IU/L (ref 10–40)
BASOPHILS ABSOLUTE: 0 THOU/MCL (ref 0–0.2)
BASOPHILS ABSOLUTE: 1 %
BILIRUB SERPL-MCNC: 1.5 MG/DL (ref 0–1.2)
BUN BLDV-MCNC: 7 MG/DL (ref 8–26)
C-REACTIVE PROTEIN WIDE RANGE: 22.5 MG/L
CALCIUM SERPL-MCNC: 8.8 MG/DL (ref 8.5–10.5)
CHLORIDE BLD-SCNC: 110 MEQ/L (ref 101–111)
CO2: 25 MMOL/L (ref 24–36)
CREAT SERPL-MCNC: 1.28 MG/DL (ref 0.64–1.27)
EOSINOPHILS ABSOLUTE: 0.4 THOU/MCL (ref 0.03–0.45)
EOSINOPHILS RELATIVE PERCENT: 5 %
GFR AFRICAN AMERICAN: 75 ML/MIN/1.73 M2
GFR NON-AFRICAN AMERICAN: 65 ML/MIN/1.73 M2
GLUCOSE BLD-MCNC: 84 MG/DL (ref 70–99)
HCT VFR BLD CALC: 36.1 % (ref 40–50)
HEMOGLOBIN: 11.7 G/DL (ref 13.5–16.5)
LYMPHOCYTES ABSOLUTE: 3.1 THOU/MCL (ref 1–4)
LYMPHOCYTES RELATIVE PERCENT: 34 %
MCH RBC QN AUTO: 30.4 PG (ref 27–33)
MCHC RBC AUTO-ENTMCNC: 32.4 G/DL (ref 32–36)
MCV RBC AUTO: 93.7 FL (ref 82–97)
MONOCYTES # BLD: 8 %
MONOCYTES ABSOLUTE: 0.7 THOU/MCL (ref 0.2–0.9)
NEUTROPHILS ABSOLUTE: 4.8 THOU/MCL (ref 1.8–7.7)
PDW BLD-RTO: 13.4 %
PLATELET # BLD: 444 THOU/MCL (ref 140–375)
PMV BLD AUTO: 9.5 FL (ref 7.4–11.5)
POTASSIUM SERPL-SCNC: ABNORMAL MEQ/L (ref 3.6–5.1)
RBC # BLD: 3.85 MIL/MCL (ref 4.4–5.8)
SEDIMENTATION RATE, ERYTHROCYTE: 13 MM/HR (ref 0–15)
SEG NEUTROPHILS: 52 %
SODIUM BLD-SCNC: 141 MEQ/L (ref 135–145)
TOTAL PROTEIN: 5.4 G/DL (ref 6–8)
VANCOMYCIN TROUGH: 17.6 MCG/ML (ref 10–20)
WBC # BLD: 8.9 THOU/MCL (ref 3.6–10.5)

## 2020-02-03 RX ORDER — OXYCODONE HYDROCHLORIDE 5 MG/1
5 TABLET ORAL EVERY 6 HOURS PRN
Qty: 28 TABLET | Refills: 0 | Status: SHIPPED | OUTPATIENT
Start: 2020-02-03 | End: 2020-02-10

## 2020-02-03 RX ORDER — OXYCODONE HYDROCHLORIDE 5 MG/1
5 TABLET ORAL EVERY 6 HOURS PRN
Qty: 28 TABLET | Refills: 0 | Status: CANCELLED | OUTPATIENT
Start: 2020-02-03 | End: 2020-02-10

## 2020-02-03 NOTE — DISCHARGE SUMMARY
Physician Discharge Summary     Patient ID:  Reji Azul  [id number]  55 y.o.  1970    Admit date: 1/22/2020    Discharge date and time: 1/25/2020  6:03 PM     Admitting Physician: Susu Polanco MD     Discharge Physician: Sho Allen MD    Admission Diagnoses: Infection of total knee replacement, initial encounter [facility]) Goran Mane, [address]    Discharge Diagnoses: Infection left total knee replacement, initial encounter    Admission Condition: good    Discharged Condition: good    Indication for Admission: Patient with increasing swelling and concerns for prosthetic infection following revision right total knee arthroplasty. He was admitted postoperatively following irrigation and debridement with polyethylene exchange. Hospital Course: Reji Azul was admitted to the medical surgical floor postoperatively. He was continued on postoperative antibiotics of vancomycin and cefepime. Infectious disease consult was obtained. DVT prophylaxis was also initiated on postoperative day #1. He made steady progress with physical therapy. He was tolerating his usual diet. PICC line was placed to aid in outpatient antibiotic administration for the infectious disease team.  He was voiding on his own. Arrangements were made for discharge based on physical therapy criteria. He was medically stable and comfortable with the discharge plan. Antibiotics at discharge according to infectious disease were Cipro 500 mg oral every 12 hours and continue 1.5 g of vancomycin IV every 12 hours. Consults: ID    Significant Diagnostic Studies:   Right knee intra-articular fluid with 6498 nucleated cells and 95% neutrophils. Swab cultures and tissue specimens with +3 to +1 WBCs. Culture still negative for growth at the time of discharge. Treatments: antibiotics: vancomycin and Cefepime  Surgical debridement with polyethylene exchange.     Disposition: Home health    In process/preliminary wound clean and dry    Follow-up with Dr. Rocky Negron in 2 weeks. Dr. Reji Zee in 3 weeks    Signed:  Vannesa Navarro.  [facility], [address] [place] [address] and Sports Medicine  02/03/20  7:35 AM

## 2020-02-03 NOTE — OP NOTE
Department of Orthopedic Surgery  Operative Report    Patient Name: Reji Azul  YOB: 1970  Medical Record [place]: [id number]    Date of Service: 1/22/2020    Pre-operative Diagnosis: Deep infection right total knee arthroplasty    Post-operative Diagnosis: Deep infection right total knee arthroplasty    Procedure: Irrigation and debridement with polyethylene exchange right total knee arthroplasty    Surgeon:  Jenelle Kramer MD    Assistant: Kolton Garcia     Anesthesia:  General endotrachial anesthesia    Estimated blood loss:  100 mL    Drains:  None    Specimens:  Specimens:   ID Type Source Tests Collected by Time Destination   1 : 1) RIGHT KNEE INTRAARTICULAR FLUID ( STAT GRAM STAIN ALSO) Joint/Joint Fluid Joint, Knee BODY FLUID CELL COUNT WITH DIFFERENTIAL, JOINT CULTURE Susu Polanco MD 1/22/2020 1132     2 : 2) SWAB RIGHT KNEE SEROMA Specimen Leg SURGICAL CULTURE Susu Polanco MD 1/22/2020 1138     3 : 3) RIGHT KNEE SEROMA Specimen Leg SURGICAL CULTURE Susu Polanco MD 1/22/2020 1139     4 : 4) SYNOVIAL LINING RIGHT KNEE #1 Specimen Leg SURGICAL CULTURE Susu Polanco MD 1/22/2020 1149     5 : 5) SYNOVIAL LINING RIGHT KNEE #2 Specimen Leg SURGICAL CULTURE Susu Polanco MD 1/22/2020 1150     6 : 6) SYNOVIAL LINING RIGHT KNEE #3 Specimen Leg SURGICAL CULTURE Susu Polanco MD 1/22/2020 1154           Implants:    Implants:  Implant Name Type Inv. Item Serial No.  Lot No. LRB No. Used   IMPL KNEE TIB ARTICULAR SZ 5TO6 17MM Knee IMPL KNEE TIB ARTICULAR SZ 5TO6 17MM   [employer] [id number] Right 1                   Findings:  : subcutaneous hematoma and gray to white fluid. Intra-articular fluid with milky / straw color. Synovial inflammation. No evidence of loss of implant / cement / bone interface.     Complications:  None apparent    Condition:  Stable    Weight Bearing Status:  Weight bearing as tolerated    Activity:  Activity as tolerated (Patient may move about with assist as indicated or with supervision.)  Limit knee flexion for first 7-10 days to active flexion only. Orthotic Management:  Knee immobilizer is needed to assist with ambulation    See dictated operative report below for full details. Indications: The patient is a 51 y.o. male with history of revision of his right total knee arthroplasty by Dr. Fabiano Harding in mid December. Knee was previously unstable and he was revised to a 17 mm thick polyethylene insert. He had been doing reasonably well but has had some issues with drainage from his wound off and on since the surgery. He developed recent increase in swelling, redness and pain with a decrease in function. Dr. Girish JOAQUIN contacted me Monday afternoon and I recommended that we consider bringing him in soon as possible for operative exploration and I&D. I reviewed with the patient and his wife in the preoperative area of the nature of the proposed surgery as well as the risks, benefits, potential complications. They also understand that the choice of nonoperative intervention is likely to lead to further decline. I reviewed with them that even with adequate irrigation debridement the components may have a biofilm which could lead to further recurrence of infection and he may eventually require explantation and antibiotic spacer placement. He is prepared to proceed and consent was obtained. All of his questions were answered to his satisfaction. Description:  The patient was identified in the pre-op holding area and the correct site of the right knee was verified and marked. All of the patient and/or family questions were addressed to their satisfaction and informed consent was verified. The patient was brought to the operating room and placed on the table in supine position and general anesthesia was administered. A well-padded non-sterile tourniquet was applied to the operative limb.   Functioning SCDs applied to the non-operative lower extremity. Care was taken to ensure all bony prominences were padded. The right lower extremity was prepped and draped in standard sterile fashion. Surgical time out was call to verify necessary data including administration of his antibiotic. The limb was exsanguinated and the tourniquet inflated to 300 mmHg. #10 blade was used to open his previous midline incision. Subcutaneous tissue was dissected with electrocautery and once we were through the superficial layer there was grade 2 milky white fluid which was swabbed and the bursal layer as well as hematoma which was removed. A portion of the hematoma was sent for culture. The retinaculum appeared intact even with full flexion on the operative table. Still, there was a significant effusion within the knee. Using an 18-gauge spinal needle through a clean prepped area of skin a 10 cc syringe was used to remove intra-articular fluid which was sent for stat cell count and Gram stain as well as cultures. The fluid was yellow to milky white in color. Decision was made at that point to go ahead and open the retinaculum and perform the polyethylene exchange. Previous arthrotomy was opened with a #10 blade. Separate instruments were used to obtain 4 synovial tissue specimens for culture along the medial and lateral gutters. The synovium showed gross inflammation. There was no evidence of disruption of the femoral or tibial cement bone interfaces. The polyethylene spacer was removed. Thorough synovectomy was then completed. Once the synovectomy was completed the joint was irrigated first with 3 L of saline impregnated with bacitracin followed by 1 L of Bactisure irrigant and then another liter of sterile saline. The entire surgical team's gloves were swapped before the final irrigation. Also instruments used for the debridement were also removed from the field.     The new 17 mm polyethylene was then inserted and snapped into position. Knee still showed good stability and range of motion. The tourniquet was deflated and hemostasis was achieved. 2 g of vancomycin powder were sprinkled within the intra-articular space. The retinaculum was closed with running #2 strata fix. There were also some interrupted #1 Vicryl was placed at critical portions of the retinaculum particularly the junction with the patella proximally. Skin was closed with interrupted 2-0 and 3-0 nylon sutures. The wound was covered with a silver impregnated thereupon dressing. Limb was then wrapped with sterile cast padding and an Ace bandage. Patient was placed in a knee immobilizer. All needle and sponge counts matched the initial count per circulating and scrub personnel x2 prior to ending the case. The patient was taken to the recovery room in stable condition having tolerated the procedure well with digits of the operating limb showing good perfusion. He will be admitted with infectious disease consultation. We will continue to monitor his cultures and direct appropriate antibiotics. He will follow-up with Dr. Zhen Cedeño over the next 2 to 3 weeks.

## 2020-02-04 ENCOUNTER — PATIENT MESSAGE (OUTPATIENT)
Dept: PSYCHIATRY | Age: 50
End: 2020-02-04

## 2020-02-05 NOTE — TELEPHONE ENCOUNTER
From: Fran Resendiz  To: Lexi Alarcon MD  Sent: 2/4/2020 7:07 PM EST  Subject: Non-Urgent Aurea Pelletier, I know I'm overdue to see you and I had intended to schedule that before now, however the best laid plans. .. December I had to have a revision of my knee replacement and, just as I was well on the mend I had to go back and have it done again because it has gotten infected. So I've been a bit on the laid-up side. Hopefully I'll be back to being able to drive myself in the next week or so and will make an appointment with your office ASAP.

## 2020-02-06 ENCOUNTER — OFFICE VISIT (OUTPATIENT)
Dept: ORTHOPEDIC SURGERY | Age: 50
End: 2020-02-06

## 2020-02-06 VITALS — WEIGHT: 266 LBS | TEMPERATURE: 98.3 F | RESPIRATION RATE: 16 BRPM | BODY MASS INDEX: 36.03 KG/M2 | HEIGHT: 72 IN

## 2020-02-06 LAB
CULTURE, JOINT AEROBIC: ABNORMAL
CULTURE, JOINT ANAEROBIC: ABNORMAL
GRAM STAIN RESULT: ABNORMAL
ORGANISM: ABNORMAL

## 2020-02-06 PROCEDURE — 99024 POSTOP FOLLOW-UP VISIT: CPT | Performed by: ORTHOPAEDIC SURGERY

## 2020-02-06 NOTE — PROGRESS NOTES
This dictation was done with [employer] dictation and may contain mechanical errors related to translation. Temperature 98.3 °F (36.8 °C), temperature source Temporal, resp. rate 16, height 6' (1.829 m), weight 266 lb (120.7 kg).

## 2020-02-10 ENCOUNTER — OFFICE VISIT (OUTPATIENT)
Dept: ORTHOPEDIC SURGERY | Age: 50
End: 2020-02-10

## 2020-02-10 VITALS — TEMPERATURE: 97.7 F | BODY MASS INDEX: 36.03 KG/M2 | HEIGHT: 72 IN | WEIGHT: 266 LBS | RESPIRATION RATE: 16 BRPM

## 2020-02-10 LAB
ALBUMIN SERPL-MCNC: 3.2 G/DL (ref 3.5–5)
ALP BLD-CCNC: 108 IU/L (ref 35–135)
ALT SERPL-CCNC: 9 IU/L (ref 10–60)
ANION GAP SERPL CALCULATED.3IONS-SCNC: 5 MMOL/L (ref 6–18)
AST SERPL-CCNC: 14 IU/L (ref 10–40)
BASOPHILS ABSOLUTE: 0 THOU/MCL (ref 0–0.2)
BASOPHILS ABSOLUTE: 1 %
BILIRUB SERPL-MCNC: 0.4 MG/DL (ref 0–1.2)
BUN BLDV-MCNC: 6 MG/DL (ref 8–26)
C-REACTIVE PROTEIN WIDE RANGE: 9.5 MG/L
CALCIUM SERPL-MCNC: 9 MG/DL (ref 8.5–10.5)
CHLORIDE BLD-SCNC: 108 MEQ/L (ref 101–111)
CO2: 25 MMOL/L (ref 24–36)
CREAT SERPL-MCNC: 1.17 MG/DL (ref 0.64–1.27)
EOSINOPHILS ABSOLUTE: 0.4 THOU/MCL (ref 0.03–0.45)
EOSINOPHILS RELATIVE PERCENT: 6 %
GFR AFRICAN AMERICAN: 83 ML/MIN/1.73 M2
GFR NON-AFRICAN AMERICAN: 72 ML/MIN/1.73 M2
GLUCOSE BLD-MCNC: 88 MG/DL (ref 70–99)
HCT VFR BLD CALC: 35.4 % (ref 40–50)
HEMOGLOBIN: 11.4 G/DL (ref 13.5–16.5)
LYMPHOCYTES ABSOLUTE: 2 THOU/MCL (ref 1–4)
LYMPHOCYTES RELATIVE PERCENT: 33 %
MCH RBC QN AUTO: 30.1 PG (ref 27–33)
MCHC RBC AUTO-ENTMCNC: 32.3 G/DL (ref 32–36)
MCV RBC AUTO: 93 FL (ref 82–97)
MONOCYTES # BLD: 7 %
MONOCYTES ABSOLUTE: 0.4 THOU/MCL (ref 0.2–0.9)
NEUTROPHILS ABSOLUTE: 3.4 THOU/MCL (ref 1.8–7.7)
PDW BLD-RTO: 13 %
PLATELET # BLD: 487 THOU/MCL (ref 140–375)
PMV BLD AUTO: 9.7 FL (ref 7.4–11.5)
POTASSIUM SERPL-SCNC: 4.8 MEQ/L (ref 3.6–5.1)
RBC # BLD: 3.81 MIL/MCL (ref 4.4–5.8)
SEDIMENTATION RATE, ERYTHROCYTE: 9 MM/HR (ref 0–15)
SEG NEUTROPHILS: 53 %
SODIUM BLD-SCNC: 138 MEQ/L (ref 135–145)
TOTAL PROTEIN: 5.8 G/DL (ref 6–8)
VANCOMYCIN TROUGH: 14.9 MCG/ML (ref 10–20)
WBC # BLD: 6.2 THOU/MCL (ref 3.6–10.5)

## 2020-02-10 PROCEDURE — 99024 POSTOP FOLLOW-UP VISIT: CPT | Performed by: ORTHOPAEDIC SURGERY

## 2020-02-10 NOTE — PROGRESS NOTES
This dictation was done with [employer] dictation and may contain mechanical errors related to translation. Temperature 97.7 °F (36.5 °C), temperature source Temporal, resp. rate 16, height 6' (1.829 m), weight 266 lb (120.7 kg).

## 2020-02-11 ENCOUNTER — TELEPHONE (OUTPATIENT)
Dept: ORTHOPEDIC SURGERY | Age: 50
End: 2020-02-11

## 2020-02-12 ENCOUNTER — CARE COORDINATION (OUTPATIENT)
Dept: CASE MANAGEMENT | Age: 50
End: 2020-02-12

## 2020-02-14 ENCOUNTER — TELEPHONE (OUTPATIENT)
Dept: ORTHOPEDIC SURGERY | Age: 50
End: 2020-02-14

## 2020-02-14 NOTE — TELEPHONE ENCOUNTER
INOCENTE    When she took off the dressing on r knee @ the Sx site patient has 3 small open areas / no signs of infection or symptoms. Please call back for area measurements if needed.

## 2020-02-17 ENCOUNTER — OFFICE VISIT (OUTPATIENT)
Dept: ORTHOPEDIC SURGERY | Age: 50
End: 2020-02-17

## 2020-02-17 VITALS — WEIGHT: 261.6 LBS | TEMPERATURE: 97.9 F | BODY MASS INDEX: 35.43 KG/M2 | HEIGHT: 72 IN

## 2020-02-17 PROCEDURE — 99024 POSTOP FOLLOW-UP VISIT: CPT | Performed by: ORTHOPAEDIC SURGERY

## 2020-02-17 RX ORDER — TRAMADOL HYDROCHLORIDE 50 MG/1
50 TABLET ORAL EVERY 4 HOURS PRN
Qty: 42 TABLET | Refills: 0 | Status: ON HOLD | OUTPATIENT
Start: 2020-02-17 | End: 2020-02-19 | Stop reason: HOSPADM

## 2020-02-18 ENCOUNTER — ANESTHESIA (OUTPATIENT)
Dept: OPERATING ROOM | Age: 50
DRG: 908 | End: 2020-02-18
Payer: COMMERCIAL

## 2020-02-18 ENCOUNTER — APPOINTMENT (OUTPATIENT)
Dept: GENERAL RADIOLOGY | Age: 50
DRG: 908 | End: 2020-02-18
Attending: ORTHOPAEDIC SURGERY
Payer: COMMERCIAL

## 2020-02-18 ENCOUNTER — ANESTHESIA EVENT (OUTPATIENT)
Dept: OPERATING ROOM | Age: 50
DRG: 908 | End: 2020-02-18
Payer: COMMERCIAL

## 2020-02-18 ENCOUNTER — HOSPITAL ENCOUNTER (OUTPATIENT)
Dept: PHYSICAL THERAPY | Age: 50
Setting detail: THERAPIES SERIES
Discharge: HOME | End: 2020-02-18
Payer: COMMERCIAL

## 2020-02-18 ENCOUNTER — HOSPITAL ENCOUNTER (INPATIENT)
Age: 50
LOS: 3 days | Discharge: HOME | DRG: 908 | End: 2020-02-21
Attending: ORTHOPAEDIC SURGERY | Admitting: ORTHOPAEDIC SURGERY
Payer: COMMERCIAL

## 2020-02-18 VITALS
RESPIRATION RATE: 2 BRPM | TEMPERATURE: 97.2 F | OXYGEN SATURATION: 99 % | DIASTOLIC BLOOD PRESSURE: 58 MMHG | SYSTOLIC BLOOD PRESSURE: 104 MMHG

## 2020-02-18 PROBLEM — T84.53XA INFECTION OF TOTAL RIGHT KNEE REPLACEMENT (HCC): Status: ACTIVE | Noted: 2020-02-18

## 2020-02-18 LAB
ABO/RH: NORMAL
ANION GAP SERPL CALCULATED.3IONS-SCNC: 13 MMOL/L (ref 3–16)
ANTIBODY SCREEN: NORMAL
APPEARANCE FLUID: NORMAL
BASOPHILS ABSOLUTE: 0.1 K/UL (ref 0–0.2)
BASOPHILS RELATIVE PERCENT: 2 %
BUN BLDV-MCNC: 4 MG/DL (ref 7–20)
CALCIUM SERPL-MCNC: 9.1 MG/DL (ref 8.3–10.6)
CELL COUNT FLUID TYPE: NORMAL
CHLORIDE BLD-SCNC: 103 MMOL/L (ref 99–110)
CLOT EVALUATION: NORMAL
CO2: 24 MMOL/L (ref 21–32)
COLOR FLUID: YELLOW
CREAT SERPL-MCNC: 0.9 MG/DL (ref 0.9–1.3)
EOSINOPHILS ABSOLUTE: 0.5 K/UL (ref 0–0.6)
EOSINOPHILS RELATIVE PERCENT: 8.4 %
ESTIMATED AVERAGE GLUCOSE: 93.9 MG/DL
GFR AFRICAN AMERICAN: >60
GFR NON-AFRICAN AMERICAN: >60
GLUCOSE BLD-MCNC: 86 MG/DL (ref 70–99)
GLUCOSE BLD-MCNC: 87 MG/DL (ref 70–99)
HBA1C MFR BLD: 4.9 %
HCT VFR BLD CALC: 36.2 % (ref 40.5–52.5)
HEMOGLOBIN: 11.9 G/DL (ref 13.5–17.5)
INR BLD: 1.24 (ref 0.86–1.14)
LYMPHOCYTES ABSOLUTE: 1.8 K/UL (ref 1–5.1)
LYMPHOCYTES RELATIVE PERCENT: 29.8 %
LYMPHOCYTES, BODY FLUID: 9 %
MCH RBC QN AUTO: 29.8 PG (ref 26–34)
MCHC RBC AUTO-ENTMCNC: 33 G/DL (ref 31–36)
MCV RBC AUTO: 90.2 FL (ref 80–100)
MONOCYTE, FLUID: 21 %
MONOCYTES ABSOLUTE: 0.5 K/UL (ref 0–1.3)
MONOCYTES RELATIVE PERCENT: 8.7 %
NEUTROPHIL, FLUID: 70 %
NEUTROPHILS ABSOLUTE: 3.1 K/UL (ref 1.7–7.7)
NEUTROPHILS RELATIVE PERCENT: 51.1 %
NUCLEATED CELLS FLUID: 299 /CUMM
NUMBER OF CELLS COUNTED FLUID: 100
PDW BLD-RTO: 13.7 % (ref 12.4–15.4)
PERFORMED ON: NORMAL
PLATELET # BLD: 384 K/UL (ref 135–450)
PMV BLD AUTO: 8.9 FL (ref 5–10.5)
POTASSIUM SERPL-SCNC: 3.9 MMOL/L (ref 3.5–5.1)
PROTHROMBIN TIME: 14.4 SEC (ref 10–13.2)
RBC # BLD: 4.01 M/UL (ref 4.2–5.9)
RBC FLUID: 5506 /CUMM
SODIUM BLD-SCNC: 140 MMOL/L (ref 136–145)
VANCOMYCIN TROUGH: 6.1 UG/ML (ref 10–20)
VOLUME: 10 ML
WBC # BLD: 6 K/UL (ref 4–11)

## 2020-02-18 PROCEDURE — 6360000002 HC RX W HCPCS: Performed by: ORTHOPAEDIC SURGERY

## 2020-02-18 PROCEDURE — 6370000000 HC RX 637 (ALT 250 FOR IP): Performed by: NURSE PRACTITIONER

## 2020-02-18 PROCEDURE — 6360000002 HC RX W HCPCS: Performed by: INTERNAL MEDICINE

## 2020-02-18 PROCEDURE — 87205 SMEAR GRAM STAIN: CPT

## 2020-02-18 PROCEDURE — 2500000003 HC RX 250 WO HCPCS: Performed by: NURSE ANESTHETIST, CERTIFIED REGISTERED

## 2020-02-18 PROCEDURE — 3700000000 HC ANESTHESIA ATTENDED CARE: Performed by: ORTHOPAEDIC SURGERY

## 2020-02-18 PROCEDURE — 2580000003 HC RX 258: Performed by: ORTHOPAEDIC SURGERY

## 2020-02-18 PROCEDURE — 85610 PROTHROMBIN TIME: CPT

## 2020-02-18 PROCEDURE — 6360000002 HC RX W HCPCS: Performed by: ANESTHESIOLOGY

## 2020-02-18 PROCEDURE — 2580000003 HC RX 258: Performed by: NURSE ANESTHETIST, CERTIFIED REGISTERED

## 2020-02-18 PROCEDURE — 83036 HEMOGLOBIN GLYCOSYLATED A1C: CPT

## 2020-02-18 PROCEDURE — 2580000003 HC RX 258: Performed by: NURSE PRACTITIONER

## 2020-02-18 PROCEDURE — 80048 BASIC METABOLIC PNL TOTAL CA: CPT

## 2020-02-18 PROCEDURE — 2580000003 HC RX 258: Performed by: INTERNAL MEDICINE

## 2020-02-18 PROCEDURE — 89051 BODY FLUID CELL COUNT: CPT

## 2020-02-18 PROCEDURE — 80202 ASSAY OF VANCOMYCIN: CPT

## 2020-02-18 PROCEDURE — 0J9N0ZZ DRAINAGE OF RIGHT LOWER LEG SUBCUTANEOUS TISSUE AND FASCIA, OPEN APPROACH: ICD-10-PCS | Performed by: ORTHOPAEDIC SURGERY

## 2020-02-18 PROCEDURE — 3600000004 HC SURGERY LEVEL 4 BASE: Performed by: ORTHOPAEDIC SURGERY

## 2020-02-18 PROCEDURE — 1200000000 HC SEMI PRIVATE

## 2020-02-18 PROCEDURE — 71045 X-RAY EXAM CHEST 1 VIEW: CPT

## 2020-02-18 PROCEDURE — 3600000014 HC SURGERY LEVEL 4 ADDTL 15MIN: Performed by: ORTHOPAEDIC SURGERY

## 2020-02-18 PROCEDURE — 3700000001 HC ADD 15 MINUTES (ANESTHESIA): Performed by: ORTHOPAEDIC SURGERY

## 2020-02-18 PROCEDURE — 6360000002 HC RX W HCPCS: Performed by: NURSE PRACTITIONER

## 2020-02-18 PROCEDURE — 85025 COMPLETE CBC W/AUTO DIFF WBC: CPT

## 2020-02-18 PROCEDURE — 87070 CULTURE OTHR SPECIMN AEROBIC: CPT

## 2020-02-18 PROCEDURE — 36415 COLL VENOUS BLD VENIPUNCTURE: CPT

## 2020-02-18 PROCEDURE — 6370000000 HC RX 637 (ALT 250 FOR IP): Performed by: ORTHOPAEDIC SURGERY

## 2020-02-18 PROCEDURE — 86850 RBC ANTIBODY SCREEN: CPT

## 2020-02-18 PROCEDURE — 86900 BLOOD TYPING SEROLOGIC ABO: CPT

## 2020-02-18 PROCEDURE — 87075 CULTR BACTERIA EXCEPT BLOOD: CPT

## 2020-02-18 PROCEDURE — 6360000002 HC RX W HCPCS: Performed by: NURSE ANESTHETIST, CERTIFIED REGISTERED

## 2020-02-18 PROCEDURE — 7100000000 HC PACU RECOVERY - FIRST 15 MIN: Performed by: ORTHOPAEDIC SURGERY

## 2020-02-18 PROCEDURE — 99255 IP/OBS CONSLTJ NEW/EST HI 80: CPT | Performed by: INTERNAL MEDICINE

## 2020-02-18 PROCEDURE — 86901 BLOOD TYPING SEROLOGIC RH(D): CPT

## 2020-02-18 PROCEDURE — 2709999900 HC NON-CHARGEABLE SUPPLY: Performed by: ORTHOPAEDIC SURGERY

## 2020-02-18 RX ORDER — MEPERIDINE HYDROCHLORIDE 25 MG/ML
12.5 INJECTION INTRAMUSCULAR; INTRAVENOUS; SUBCUTANEOUS EVERY 5 MIN PRN
Status: DISCONTINUED | OUTPATIENT
Start: 2020-02-18 | End: 2020-02-18

## 2020-02-18 RX ORDER — LIDOCAINE HYDROCHLORIDE 20 MG/ML
INJECTION, SOLUTION EPIDURAL; INFILTRATION; INTRACAUDAL; PERINEURAL PRN
Status: DISCONTINUED | OUTPATIENT
Start: 2020-02-18 | End: 2020-02-18 | Stop reason: SDUPTHER

## 2020-02-18 RX ORDER — PANTOPRAZOLE SODIUM 40 MG/1
40 TABLET, DELAYED RELEASE ORAL
Status: DISCONTINUED | OUTPATIENT
Start: 2020-02-19 | End: 2020-02-21 | Stop reason: HOSPADM

## 2020-02-18 RX ORDER — HYDRALAZINE HYDROCHLORIDE 20 MG/ML
5 INJECTION INTRAMUSCULAR; INTRAVENOUS
Status: DISCONTINUED | OUTPATIENT
Start: 2020-02-18 | End: 2020-02-18

## 2020-02-18 RX ORDER — SODIUM CHLORIDE 0.9 % (FLUSH) 0.9 %
10 SYRINGE (ML) INJECTION EVERY 12 HOURS SCHEDULED
Status: DISCONTINUED | OUTPATIENT
Start: 2020-02-18 | End: 2020-02-18

## 2020-02-18 RX ORDER — SUCCINYLCHOLINE/SOD CL,ISO/PF 200MG/10ML
SYRINGE (ML) INTRAVENOUS PRN
Status: DISCONTINUED | OUTPATIENT
Start: 2020-02-18 | End: 2020-02-18 | Stop reason: SDUPTHER

## 2020-02-18 RX ORDER — SODIUM CHLORIDE 0.9 % (FLUSH) 0.9 %
10 SYRINGE (ML) INJECTION EVERY 12 HOURS SCHEDULED
Status: DISCONTINUED | OUTPATIENT
Start: 2020-02-18 | End: 2020-02-20 | Stop reason: SDUPTHER

## 2020-02-18 RX ORDER — ACETAMINOPHEN 325 MG/1
650 TABLET ORAL EVERY 6 HOURS
Status: DISCONTINUED | OUTPATIENT
Start: 2020-02-18 | End: 2020-02-21 | Stop reason: HOSPADM

## 2020-02-18 RX ORDER — DOCUSATE SODIUM 100 MG/1
100 CAPSULE, LIQUID FILLED ORAL 2 TIMES DAILY
Status: DISCONTINUED | OUTPATIENT
Start: 2020-02-18 | End: 2020-02-21 | Stop reason: HOSPADM

## 2020-02-18 RX ORDER — ONDANSETRON 2 MG/ML
4 INJECTION INTRAMUSCULAR; INTRAVENOUS
Status: DISCONTINUED | OUTPATIENT
Start: 2020-02-18 | End: 2020-02-18

## 2020-02-18 RX ORDER — TRAZODONE HYDROCHLORIDE 100 MG/1
200 TABLET ORAL NIGHTLY
Status: DISCONTINUED | OUTPATIENT
Start: 2020-02-18 | End: 2020-02-21 | Stop reason: HOSPADM

## 2020-02-18 RX ORDER — OXYCODONE HYDROCHLORIDE AND ACETAMINOPHEN 5; 325 MG/1; MG/1
2 TABLET ORAL PRN
Status: DISCONTINUED | OUTPATIENT
Start: 2020-02-18 | End: 2020-02-18

## 2020-02-18 RX ORDER — SODIUM CHLORIDE, SODIUM LACTATE, POTASSIUM CHLORIDE, CALCIUM CHLORIDE 600; 310; 30; 20 MG/100ML; MG/100ML; MG/100ML; MG/100ML
INJECTION, SOLUTION INTRAVENOUS CONTINUOUS
Status: DISCONTINUED | OUTPATIENT
Start: 2020-02-18 | End: 2020-02-18

## 2020-02-18 RX ORDER — FENTANYL CITRATE 50 UG/ML
25 INJECTION, SOLUTION INTRAMUSCULAR; INTRAVENOUS EVERY 5 MIN PRN
Status: DISCONTINUED | OUTPATIENT
Start: 2020-02-18 | End: 2020-02-18

## 2020-02-18 RX ORDER — PROPOFOL 10 MG/ML
INJECTION, EMULSION INTRAVENOUS PRN
Status: DISCONTINUED | OUTPATIENT
Start: 2020-02-18 | End: 2020-02-18 | Stop reason: SDUPTHER

## 2020-02-18 RX ORDER — OXYCODONE HYDROCHLORIDE 5 MG/1
5 TABLET ORAL EVERY 4 HOURS PRN
Status: DISCONTINUED | OUTPATIENT
Start: 2020-02-18 | End: 2020-02-18

## 2020-02-18 RX ORDER — SODIUM CHLORIDE 9 MG/ML
INJECTION, SOLUTION INTRAVENOUS CONTINUOUS PRN
Status: DISCONTINUED | OUTPATIENT
Start: 2020-02-18 | End: 2020-02-18 | Stop reason: SDUPTHER

## 2020-02-18 RX ORDER — DICYCLOMINE HCL 20 MG
40 TABLET ORAL 2 TIMES DAILY PRN
Status: DISCONTINUED | OUTPATIENT
Start: 2020-02-18 | End: 2020-02-21 | Stop reason: HOSPADM

## 2020-02-18 RX ORDER — SODIUM CHLORIDE 9 MG/ML
INJECTION, SOLUTION INTRAVENOUS CONTINUOUS
Status: DISCONTINUED | OUTPATIENT
Start: 2020-02-18 | End: 2020-02-18

## 2020-02-18 RX ORDER — SODIUM CHLORIDE 450 MG/100ML
INJECTION, SOLUTION INTRAVENOUS CONTINUOUS
Status: DISCONTINUED | OUTPATIENT
Start: 2020-02-18 | End: 2020-02-21 | Stop reason: HOSPADM

## 2020-02-18 RX ORDER — OXYCODONE HYDROCHLORIDE AND ACETAMINOPHEN 5; 325 MG/1; MG/1
1 TABLET ORAL PRN
Status: DISCONTINUED | OUTPATIENT
Start: 2020-02-18 | End: 2020-02-18

## 2020-02-18 RX ORDER — OXYCODONE HYDROCHLORIDE 5 MG/1
5 TABLET ORAL EVERY 4 HOURS PRN
Status: DISCONTINUED | OUTPATIENT
Start: 2020-02-18 | End: 2020-02-21 | Stop reason: HOSPADM

## 2020-02-18 RX ORDER — OXYCODONE HYDROCHLORIDE 5 MG/1
10 TABLET ORAL EVERY 4 HOURS PRN
Status: DISCONTINUED | OUTPATIENT
Start: 2020-02-18 | End: 2020-02-18

## 2020-02-18 RX ORDER — SODIUM CHLORIDE 0.9 % (FLUSH) 0.9 %
10 SYRINGE (ML) INJECTION PRN
Status: DISCONTINUED | OUTPATIENT
Start: 2020-02-18 | End: 2020-02-18

## 2020-02-18 RX ORDER — ATORVASTATIN CALCIUM 40 MG/1
40 TABLET, FILM COATED ORAL NIGHTLY
Status: DISCONTINUED | OUTPATIENT
Start: 2020-02-18 | End: 2020-02-21 | Stop reason: HOSPADM

## 2020-02-18 RX ORDER — CEFAZOLIN SODIUM 2 G/50ML
2 SOLUTION INTRAVENOUS
Status: DISCONTINUED | OUTPATIENT
Start: 2020-02-18 | End: 2020-02-18

## 2020-02-18 RX ORDER — MIDAZOLAM HYDROCHLORIDE 1 MG/ML
INJECTION INTRAMUSCULAR; INTRAVENOUS PRN
Status: DISCONTINUED | OUTPATIENT
Start: 2020-02-18 | End: 2020-02-18 | Stop reason: SDUPTHER

## 2020-02-18 RX ORDER — ONDANSETRON 2 MG/ML
INJECTION INTRAMUSCULAR; INTRAVENOUS PRN
Status: DISCONTINUED | OUTPATIENT
Start: 2020-02-18 | End: 2020-02-18 | Stop reason: SDUPTHER

## 2020-02-18 RX ORDER — CEFAZOLIN SODIUM 1 G/3ML
INJECTION, POWDER, FOR SOLUTION INTRAMUSCULAR; INTRAVENOUS PRN
Status: DISCONTINUED | OUTPATIENT
Start: 2020-02-18 | End: 2020-02-18 | Stop reason: SDUPTHER

## 2020-02-18 RX ORDER — ONDANSETRON 2 MG/ML
4 INJECTION INTRAMUSCULAR; INTRAVENOUS EVERY 6 HOURS PRN
Status: DISCONTINUED | OUTPATIENT
Start: 2020-02-18 | End: 2020-02-21 | Stop reason: HOSPADM

## 2020-02-18 RX ORDER — CEFAZOLIN SODIUM 2 G/50ML
2 SOLUTION INTRAVENOUS EVERY 8 HOURS
Status: DISCONTINUED | OUTPATIENT
Start: 2020-02-19 | End: 2020-02-18

## 2020-02-18 RX ORDER — SENNA AND DOCUSATE SODIUM 50; 8.6 MG/1; MG/1
1 TABLET, FILM COATED ORAL 2 TIMES DAILY
Status: DISCONTINUED | OUTPATIENT
Start: 2020-02-18 | End: 2020-02-21 | Stop reason: HOSPADM

## 2020-02-18 RX ORDER — GLYCOPYRROLATE 0.2 MG/ML
INJECTION INTRAMUSCULAR; INTRAVENOUS PRN
Status: DISCONTINUED | OUTPATIENT
Start: 2020-02-18 | End: 2020-02-18 | Stop reason: SDUPTHER

## 2020-02-18 RX ORDER — MULTIVITAMIN WITH FOLIC ACID 400 MCG
1 TABLET ORAL DAILY
Status: DISCONTINUED | OUTPATIENT
Start: 2020-02-19 | End: 2020-02-21 | Stop reason: HOSPADM

## 2020-02-18 RX ORDER — FLUOXETINE HYDROCHLORIDE 20 MG/1
20 CAPSULE ORAL DAILY
Status: DISCONTINUED | OUTPATIENT
Start: 2020-02-19 | End: 2020-02-21 | Stop reason: HOSPADM

## 2020-02-18 RX ORDER — SODIUM CHLORIDE 0.9 % (FLUSH) 0.9 %
10 SYRINGE (ML) INJECTION PRN
Status: DISCONTINUED | OUTPATIENT
Start: 2020-02-18 | End: 2020-02-20 | Stop reason: SDUPTHER

## 2020-02-18 RX ORDER — OXYCODONE HYDROCHLORIDE 5 MG/1
10 TABLET ORAL EVERY 4 HOURS PRN
Status: DISCONTINUED | OUTPATIENT
Start: 2020-02-18 | End: 2020-02-21 | Stop reason: HOSPADM

## 2020-02-18 RX ORDER — VANCOMYCIN HYDROCHLORIDE 1 G/20ML
INJECTION, POWDER, LYOPHILIZED, FOR SOLUTION INTRAVENOUS
Status: COMPLETED | OUTPATIENT
Start: 2020-02-18 | End: 2020-02-18

## 2020-02-18 RX ORDER — LABETALOL HYDROCHLORIDE 5 MG/ML
5 INJECTION, SOLUTION INTRAVENOUS EVERY 10 MIN PRN
Status: DISCONTINUED | OUTPATIENT
Start: 2020-02-18 | End: 2020-02-18

## 2020-02-18 RX ORDER — FENTANYL CITRATE 50 UG/ML
INJECTION, SOLUTION INTRAMUSCULAR; INTRAVENOUS PRN
Status: DISCONTINUED | OUTPATIENT
Start: 2020-02-18 | End: 2020-02-18 | Stop reason: SDUPTHER

## 2020-02-18 RX ADMIN — SODIUM CHLORIDE, PRESERVATIVE FREE 10 ML: 5 INJECTION INTRAVENOUS at 22:49

## 2020-02-18 RX ADMIN — ACETAMINOPHEN 650 MG: 325 TABLET ORAL at 17:07

## 2020-02-18 RX ADMIN — MIDAZOLAM 2 MG: 1 INJECTION INTRAMUSCULAR; INTRAVENOUS at 14:58

## 2020-02-18 RX ADMIN — SODIUM CHLORIDE, POTASSIUM CHLORIDE, SODIUM LACTATE AND CALCIUM CHLORIDE: 600; 310; 30; 20 INJECTION, SOLUTION INTRAVENOUS at 11:26

## 2020-02-18 RX ADMIN — CEFAZOLIN SODIUM 2000 MG: 1 INJECTION, POWDER, FOR SOLUTION INTRAMUSCULAR; INTRAVENOUS at 14:58

## 2020-02-18 RX ADMIN — Medication 10 ML: at 20:52

## 2020-02-18 RX ADMIN — ATORVASTATIN CALCIUM 40 MG: 40 TABLET, FILM COATED ORAL at 20:52

## 2020-02-18 RX ADMIN — TRAZODONE HYDROCHLORIDE 200 MG: 100 TABLET ORAL at 20:52

## 2020-02-18 RX ADMIN — GLYCOPYRROLATE 0.2 MG: 0.2 INJECTION, SOLUTION INTRAMUSCULAR; INTRAVENOUS at 15:04

## 2020-02-18 RX ADMIN — OXYCODONE 10 MG: 5 TABLET ORAL at 12:15

## 2020-02-18 RX ADMIN — PROPOFOL 200 MG: 10 INJECTION, EMULSION INTRAVENOUS at 15:05

## 2020-02-18 RX ADMIN — CEFAZOLIN SODIUM 2 G: 2 SOLUTION INTRAVENOUS at 16:55

## 2020-02-18 RX ADMIN — CEFEPIME HYDROCHLORIDE 2 G: 2 INJECTION, POWDER, FOR SOLUTION INTRAVENOUS at 18:53

## 2020-02-18 RX ADMIN — DOCUSATE SODIUM 100 MG: 100 CAPSULE, LIQUID FILLED ORAL at 20:52

## 2020-02-18 RX ADMIN — FENTANYL CITRATE 100 MCG: 50 INJECTION INTRAMUSCULAR; INTRAVENOUS at 15:04

## 2020-02-18 RX ADMIN — HYDROMORPHONE HYDROCHLORIDE 0.5 MG: 1 INJECTION, SOLUTION INTRAMUSCULAR; INTRAVENOUS; SUBCUTANEOUS at 16:13

## 2020-02-18 RX ADMIN — SODIUM CHLORIDE: 9 INJECTION, SOLUTION INTRAVENOUS at 14:40

## 2020-02-18 RX ADMIN — HYDROMORPHONE HYDROCHLORIDE 1 MG: 1 INJECTION, SOLUTION INTRAMUSCULAR; INTRAVENOUS; SUBCUTANEOUS at 18:12

## 2020-02-18 RX ADMIN — HYDROMORPHONE HYDROCHLORIDE 0.5 MG: 1 INJECTION, SOLUTION INTRAMUSCULAR; INTRAVENOUS; SUBCUTANEOUS at 10:44

## 2020-02-18 RX ADMIN — SENNOSIDES AND DOCUSATE SODIUM 1 TABLET: 8.6; 5 TABLET ORAL at 20:52

## 2020-02-18 RX ADMIN — LIDOCAINE HYDROCHLORIDE 100 MG: 20 INJECTION, SOLUTION EPIDURAL; INFILTRATION; INTRACAUDAL; PERINEURAL at 15:04

## 2020-02-18 RX ADMIN — ONDANSETRON 4 MG: 2 INJECTION INTRAMUSCULAR; INTRAVENOUS at 15:23

## 2020-02-18 RX ADMIN — Medication 160 MG: at 15:04

## 2020-02-18 RX ADMIN — OXYCODONE 10 MG: 5 TABLET ORAL at 20:51

## 2020-02-18 RX ADMIN — SODIUM CHLORIDE, PRESERVATIVE FREE 10 ML: 5 INJECTION INTRAVENOUS at 22:21

## 2020-02-18 RX ADMIN — OXYCODONE 10 MG: 5 TABLET ORAL at 17:06

## 2020-02-18 RX ADMIN — CEFAZOLIN SODIUM 1000 MG: 1 INJECTION, POWDER, FOR SOLUTION INTRAMUSCULAR; INTRAVENOUS at 15:06

## 2020-02-18 RX ADMIN — VANCOMYCIN HYDROCHLORIDE 1000 MG: 1 INJECTION, POWDER, LYOPHILIZED, FOR SOLUTION INTRAVENOUS at 20:53

## 2020-02-18 RX ADMIN — HYDROMORPHONE HYDROCHLORIDE 1 MG: 1 INJECTION, SOLUTION INTRAMUSCULAR; INTRAVENOUS; SUBCUTANEOUS at 22:48

## 2020-02-18 ASSESSMENT — PAIN DESCRIPTION - DESCRIPTORS
DESCRIPTORS: ACHING
DESCRIPTORS: ACHING;SHARP
DESCRIPTORS: ACHING
DESCRIPTORS: ACHING;SHARP
DESCRIPTORS: ACHING
DESCRIPTORS: ACHING;SHARP
DESCRIPTORS: ACHING;SHARP
DESCRIPTORS: ACHING
DESCRIPTORS: ACHING
DESCRIPTORS: ACHING;SHARP
DESCRIPTORS: ACHING;SHARP
DESCRIPTORS: ACHING
DESCRIPTORS: ACHING

## 2020-02-18 ASSESSMENT — PULMONARY FUNCTION TESTS
PIF_VALUE: 13
PIF_VALUE: 3
PIF_VALUE: 14
PIF_VALUE: 28
PIF_VALUE: 13
PIF_VALUE: 13
PIF_VALUE: 15
PIF_VALUE: 33
PIF_VALUE: 13
PIF_VALUE: 14
PIF_VALUE: 13
PIF_VALUE: 2
PIF_VALUE: 2
PIF_VALUE: 13
PIF_VALUE: 14
PIF_VALUE: 8
PIF_VALUE: 13
PIF_VALUE: 14
PIF_VALUE: 1
PIF_VALUE: 13
PIF_VALUE: 13
PIF_VALUE: 8
PIF_VALUE: 8
PIF_VALUE: 2
PIF_VALUE: 14
PIF_VALUE: 0
PIF_VALUE: 14
PIF_VALUE: 4
PIF_VALUE: 0
PIF_VALUE: 14
PIF_VALUE: 0
PIF_VALUE: 3
PIF_VALUE: 26
PIF_VALUE: 14
PIF_VALUE: 0
PIF_VALUE: 16
PIF_VALUE: 14
PIF_VALUE: 13
PIF_VALUE: 13
PIF_VALUE: 8
PIF_VALUE: 14
PIF_VALUE: 14
PIF_VALUE: 13
PIF_VALUE: 13
PIF_VALUE: 16
PIF_VALUE: 14
PIF_VALUE: 14
PIF_VALUE: 13
PIF_VALUE: 14
PIF_VALUE: 10
PIF_VALUE: 14
PIF_VALUE: 13
PIF_VALUE: 2
PIF_VALUE: 14
PIF_VALUE: 14
PIF_VALUE: 10
PIF_VALUE: 13
PIF_VALUE: 14
PIF_VALUE: 4

## 2020-02-18 ASSESSMENT — PAIN SCALES - GENERAL
PAINLEVEL_OUTOF10: 8
PAINLEVEL_OUTOF10: 7
PAINLEVEL_OUTOF10: 3
PAINLEVEL_OUTOF10: 8
PAINLEVEL_OUTOF10: 7
PAINLEVEL_OUTOF10: 9
PAINLEVEL_OUTOF10: 4
PAINLEVEL_OUTOF10: 7
PAINLEVEL_OUTOF10: 6
PAINLEVEL_OUTOF10: 6
PAINLEVEL_OUTOF10: 5
PAINLEVEL_OUTOF10: 6
PAINLEVEL_OUTOF10: 4
PAINLEVEL_OUTOF10: 7
PAINLEVEL_OUTOF10: 9
PAINLEVEL_OUTOF10: 5

## 2020-02-18 ASSESSMENT — PAIN SCALES - WONG BAKER
WONGBAKER_NUMERICALRESPONSE: 0

## 2020-02-18 ASSESSMENT — PAIN DESCRIPTION - FREQUENCY
FREQUENCY: CONTINUOUS

## 2020-02-18 ASSESSMENT — PAIN DESCRIPTION - LOCATION
LOCATION: KNEE

## 2020-02-18 ASSESSMENT — PAIN DESCRIPTION - PAIN TYPE
TYPE: SURGICAL PAIN
TYPE: ACUTE PAIN
TYPE: ACUTE PAIN;CHRONIC PAIN
TYPE: ACUTE PAIN
TYPE: ACUTE PAIN;CHRONIC PAIN
TYPE: SURGICAL PAIN
TYPE: ACUTE PAIN
TYPE: SURGICAL PAIN

## 2020-02-18 ASSESSMENT — PAIN DESCRIPTION - ORIENTATION
ORIENTATION: RIGHT

## 2020-02-18 ASSESSMENT — PAIN DESCRIPTION - ONSET
ONSET: ON-GOING

## 2020-02-18 ASSESSMENT — PAIN - FUNCTIONAL ASSESSMENT
PAIN_FUNCTIONAL_ASSESSMENT: PREVENTS OR INTERFERES WITH MANY ACTIVE NOT PASSIVE ACTIVITIES
PAIN_FUNCTIONAL_ASSESSMENT: PREVENTS OR INTERFERES SOME ACTIVE ACTIVITIES AND ADLS
PAIN_FUNCTIONAL_ASSESSMENT: PREVENTS OR INTERFERES WITH ALL ACTIVE AND SOME PASSIVE ACTIVITIES
PAIN_FUNCTIONAL_ASSESSMENT: PREVENTS OR INTERFERES SOME ACTIVE ACTIVITIES AND ADLS
PAIN_FUNCTIONAL_ASSESSMENT: PREVENTS OR INTERFERES SOME ACTIVE ACTIVITIES AND ADLS
PAIN_FUNCTIONAL_ASSESSMENT: 0-10
PAIN_FUNCTIONAL_ASSESSMENT: PREVENTS OR INTERFERES SOME ACTIVE ACTIVITIES AND ADLS

## 2020-02-18 ASSESSMENT — PAIN DESCRIPTION - PROGRESSION
CLINICAL_PROGRESSION: NOT CHANGED
CLINICAL_PROGRESSION: GRADUALLY WORSENING
CLINICAL_PROGRESSION: GRADUALLY IMPROVING
CLINICAL_PROGRESSION: GRADUALLY WORSENING
CLINICAL_PROGRESSION: GRADUALLY IMPROVING
CLINICAL_PROGRESSION: NOT CHANGED
CLINICAL_PROGRESSION: GRADUALLY IMPROVING
CLINICAL_PROGRESSION: NOT CHANGED
CLINICAL_PROGRESSION: GRADUALLY WORSENING
CLINICAL_PROGRESSION: GRADUALLY IMPROVING

## 2020-02-18 NOTE — ANESTHESIA POSTPROCEDURE EVALUATION
Department of Anesthesiology  Postprocedure Note    Patient: Jennifer Howard  MRN: [id number]  YOB: 1970  Date of evaluation: 2/18/2020  Time:  5:10 PM     Procedure Summary     Date:  02/18/20 Room / Location:  [address]    Anesthesia Start:  1458 Anesthesia Stop:  1611    Procedure:  INCISION AND DRAINAGE RIGHT KNEE AND WOULD VAC PLACEMENT (Right Knee) Diagnosis:  (RIGHT TOTAL KNEE INFECTION)    Surgeon:  Giuseppe Rene MD Responsible Provider:  Ana Dudley MD    Anesthesia Type:  general ASA Status:  3          Anesthesia Type: general    Cande Phase I: Cande Score: 10    Cande Phase II:      Last vitals: Reviewed and per EMR flowsheets.        Anesthesia Post Evaluation    Patient location during evaluation: PACU  Level of consciousness: awake and alert  Airway patency: patent  Nausea & Vomiting: no nausea and no vomiting  Complications: no  Cardiovascular status: blood pressure returned to baseline  Respiratory status: acceptable  Hydration status: euvolemic  Comments: Postoperative Anesthesia Note    Name:    Jennifer Howard  MRN:      [id number]    Patient Vitals in the past 12 hrs:  02/18/20 1634, BP:131/86, Temp:97.4 °F (36.3 °C), Temp src:Oral, Pulse:77, Resp:17, SpO2:96 %  02/18/20 1617, BP:133/77, Pulse:68, Resp:16, SpO2:99 %  02/18/20 1616, Temp:97.2 °F (36.2 °C), Temp src:Temporal  02/18/20 1615, SpO2:100 %  02/18/20 1612, BP:135/78, Pulse:75, Resp:13, SpO2:100 %  02/18/20 1609, BP:131/80, Pulse:70, Resp:15, SpO2:100 %  02/18/20 1606, BP:131/80, Temp:97 °F (36.1 °C), Temp src:Temporal, Pulse:74, Resp:13, SpO2:100 %  02/18/20 1405, BP:117/73, Temp:98.1 °F (36.7 °C), Temp src:Temporal, Pulse:60, Resp:18, SpO2:94 %, Height:6' (1.829 m), Weight:251 lb (113.9 kg)  02/18/20 1030, Weight:251 lb 15.8 oz (114.3 kg)  02/18/20 1015, Height:6' (1.829 m), Weight:249 lb 12.5 oz (113.3 kg)  02/18/20 0946, BP:107/73, Temp:98.1 °F (36.7 °C), Temp src:Oral, Pulse:62, Resp:17, SpO2:97 %     LABS:    CBC  Lab Results       Component                Value               Date/Time                  WBC                      6.0                 02/18/2020 10:22 AM        HGB                      11.9 (L)            02/18/2020 10:22 AM        HCT                      36.2 (L)            02/18/2020 10:22 AM        PLT                      384                 02/18/2020 10:22 AM   RENAL  Lab Results       Component                Value               Date/Time                  NA                       140                 02/18/2020 10:22 AM        K                        3.9                 02/18/2020 10:22 AM        K                        4.2                 02/01/2020 10:25 PM        CL                       103                 02/18/2020 10:22 AM        CO2                      24                  02/18/2020 10:22 AM        BUN                      4 (L)               02/18/2020 10:22 AM        CREATININE               0.9                 02/18/2020 10:22 AM        GLUCOSE                  86                  02/18/2020 10:22 AM        GLUCOSE                  84                  01/07/2015 10:47 AM   COAGS  Lab Results       Component                Value               Date/Time                  PROTIME                  14.4 (H)            02/18/2020 10:22 AM        INR                      1.24 (H)            02/18/2020 10:22 AM        APTT                     40.9 (H)            12/17/2019 06:30 AM     Intake & Output:  @20RHNR@    Nausea & Vomiting:  No    Level of Consciousness:  Awake    Pain Assessment:  Adequate analgesia    Anesthesia Complications:  No apparent anesthetic complications    SUMMARY      Vital signs stable  OK to discharge from Stage I post anesthesia care.   Care transferred from Anesthesiology department on discharge from perioperative area

## 2020-02-18 NOTE — ANESTHESIA PRE PROCEDURE
Provider, MD   Multiple Vitamin TABS Take 1 tablet by mouth daily    Yes Historical Provider, MD       Current medications:    Current Facility-Administered Medications   Medication Dose Route Frequency Provider Last Rate Last Dose    atorvastatin (LIPITOR) tablet 40 mg  40 mg Oral Nightly William Rishi, APRN - CNP        calcium-vitamin D (OSCAL-500) 500-200 MG-UNIT per tablet 1 tablet  1 tablet Oral BID  William He, APRN - CNP        dicyclomine (BENTYL) tablet 40 mg  40 mg Oral BID PRN William He, APRN - CNP        [START ON 2/19/2020] FLUoxetine (PROZAC) capsule 20 mg  20 mg Oral Daily William He, APRN - CNP        [START ON 2/19/2020] multivitamin 1 tablet  1 tablet Oral Daily [facility], APRN - CNP        [START ON 2/19/2020] pantoprazole (PROTONIX) tablet 40 mg  40 mg Oral QAM  William He, APRN - CNP        traZODone (DESYREL) tablet 200 mg  200 mg Oral Nightly William He, APRN - CNP        oxyCODONE (ROXICODONE) immediate release tablet 10 mg  10 mg Oral Q4H PRN William Rishi, APRN - CNP   10 mg at 02/18/20 1215    oxyCODONE (ROXICODONE) immediate release tablet 5 mg  5 mg Oral Q4H PRN William Rishi, APRN - CNP        lactated ringers infusion   Intravenous Continuous William Rishi, APRN -  mL/hr at 02/18/20 1126      0.9 % sodium chloride infusion   Intravenous Continuous Rosalino Jeffers MD        sodium chloride flush 0.9 % injection 10 mL  10 mL Intravenous 2 times per day Rosalino Jeffers MD        sodium chloride flush 0.9 % injection 10 mL  10 mL Intravenous PRN Rosalino Jeffers MD        fentaNYL (SUBLIMAZE) injection 25 mcg  25 mcg Intravenous Q5 Min PRN Rosalino Jeffers MD        HYDROmorphone (DILAUDID) injection 0.5 mg  0.5 mg Intravenous Q5 Min PRN Rosalino Jeffers MD        fentaNYL (SUBLIMAZE) injection 25 mcg  25 mcg Intravenous Q5 Min PRN Fareed Alonso MD Diaz        ondansetron [facility]) injection 4 mg  4 mg Intravenous Once PRN Presley Welsh MD        ceFAZolin (ANCEF) 2 g in dextrose 3 % 50 mL IVPB (duplex)  2 g Intravenous On Call to LILLIE Oneill CNP        alteplase (CATHFLO) injection 1 mg  1 mg Intracatheter Once Pattricia Latdena, APRN - CNP           Allergies: Allergies   Allergen Reactions    Nsaids Nausea Only and Other (See Comments)     Hx of Barretts esophagus      Prochlorperazine Other (See Comments)     No allergic reaction, patient reported sense of \"restlessness\" and fidgiting    Valtrex [Valacyclovir Hcl] Diarrhea    Morphine Rash    Morphine And Related Itching    Tolmetin Nausea Only     Hx of Barretts esophagus       Problem List:    Patient Active Problem List   Diagnosis Code    Insomnia-chronic intermittent--uses prn ambien--to be filled by dr Maxime Rivas (sleep dr) Manuel Reilly    Vaughn esophagus-on daily ppi-last egd 5/15--dr arce K22.70    History of tobacco useadvised to quit- quit 7/1/2014 Z87.891    Allergic rhinitis, seasonal J30.2    Obstructive sleep apnea,Severe -(on cpap) G47.33    Class 1 obesity due to excess calories with body mass index (BMI) of 34.0 to 34.9 in adult E66.09, Z68.34    Depression-on daily effexor xr--sees dr Sharon Spence F32.9    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 Z90.81    Chondromalacia of patellofemoral joint M22.40    Chronic pain syndrome G89.4    History of stroke Z86.73    Intracranial hypertension G93.2    ETOHism (HCC)--1/16--detoxed at [facility]--attending AA F10.20    Gastroesophageal reflux disease with esophagitis--on daily ppi K21.0    Intractable chronic migraine without aura and with status migrainosus G43.711    Iron deficiency anemia D50.9    B12 deficiency E53.8    Anastomotic ulcer K28.9    Malignant neoplasm of left kidney ([address] 8/1/18 Dr Darrell Mazariegos.    C64.2    Malignant HERNIA REPAIR  3-    ventral    JOINT REPLACEMENT      KIDNEY REMOVAL Left 2018    KNEE ARTHROSCOPY Right 2013    Dr.Robert Sanders     KNEE ARTHROSCOPY Right 12    RIGHT KNEE ARTHROSCOPY WITH CHONDROPLASTY    KNEE SURGERY  2012    right, arthroscopy    LAPAROTOMY      LASIK      OTHER SURGICAL HISTORY Left 2016    CT biopsy ablasion left kidney    OTHER SURGICAL HISTORY      small intestine 12 inch removed, 2 hernia surgeries, another surgery to drain infection from incision.      REVISION TOTAL KNEE ARTHROPLASTY Right 2019    RIGHT REVISION TIBIA TOTAL KNEE REPLACEMENT performed by Bruno Curry MD at [address] [employer] Right 2020    RIGHT KNEE IRRIGATION AND DEBRIDEMENT WITH POLY EXCHANGE performed by Surendra Mercedes MD at [address] [facility]  10/15/13    RIGHT    UPPER GASTROINTESTINAL ENDOSCOPY  2016    UPPER GASTROINTESTINAL ENDOSCOPY  2018       Social History:    Social History     Tobacco Use    Smoking status: Former Smoker     Packs/day: 0.50     Years: 25.00     Pack years: 12.50     Types: Cigarettes     Last attempt to quit: 2017     Years since quittin.5    Smokeless tobacco: Never Used   Substance Use Topics    Alcohol use: No     Alcohol/week: 0.0 standard drinks     Comment: last drink                                 Counseling given: Not Answered      Vital Signs (Current):   Vitals:    20 0946 20 1015 20 1030 20 1405   BP: 107/73   117/73   Pulse: 62   60   Resp: 17   18   Temp: 98.1 °F (36.7 °C)   98.1 °F (36.7 °C)   TempSrc: Oral   Temporal   SpO2: 97%   94%   Weight:  249 lb 12.5 oz (113.3 kg) 251 lb 15.8 oz (114.3 kg) 251 lb (113.9 kg)   Height:  6' (1.829 m)  6' (1.829 m)                                              BP Readings from Last 3 Encounters:   20 117/73   20 134/70   20 (!) 85/49 GI/Hepatic/Renal:   (+) GERD:,           Endo/Other:                     Abdominal:           Vascular:                                        Anesthesia Plan      general     ASA 3     (I discussed with the patient the risks and benefits of PIV, general anesthesia, IV Narcotics, PACU. All questions were answered the patient agrees with the plan.)  Induction: intravenous. Anesthetic plan and risks discussed with patient. Plan discussed with CRNA.                   Magui Nowak MD   2/18/2020

## 2020-02-18 NOTE — FLOWSHEET NOTE
Pt expressed frustration and stress about his medical and physical situation and the impact it has had on him personally and on his family. Pt mentioned he has support from family and Alcoholics Anonymous. 02/18/20 1449   Encounter Summary   Services provided to: Patient   Referral/Consult From: Nurse   Support System Spouse;Parent; Children   Place of Buddhism Spiritual, not Religion   Continue Visiting   (visit, [place] 2/18 CL)   Complexity of Encounter Moderate   Length of Encounter 30 minutes   Spiritual/U[place]   Type Spiritual support   Assessment Calm; Approachable;Tearful  (frustrated)   Intervention Active listening;Explored feelings, thoughts, concerns; Discussed belief system/Religion practices/ronnie;Discussed illness/injury and it's impact  (discussed life change and family)   Outcome Engaged in conversation;Coping;Encouraged        02/18/20 [address] [facility] provided to: Patient   Referral/Consult From: Jana Blevins [id number]; Children   Place of Buddhism Spiritual, not Religion   Continue Visiting   (visit, [place] 2/18 CL)   Complexity of Encounter Moderate   Length of Encounter 30 minutes   Spiritual/Uatsdin   Type Spiritual support   Assessment Calm; Approachable;Tearful  (frustrated)   Intervention Active listening;Explored feelings, thoughts, concerns; Discussed belief system/Religion practices/ronnie;Discussed illness/injury and it's impact  (discussed life change and family)   Outcome Engaged in conversation;Coping;Encouraged   Electronically signed by Morro Pompa on 2/18/2020 at 3:01 PM

## 2020-02-18 NOTE — ANESTHESIA PRE PROCEDURE
[facility] Department of Anesthesiology  Pre-Anesthesia Evaluation/Consultation       Name:  Manuel Alcaraz  : 1970  Age:  51 y.o. MRN:  [id number]  Date: 2020           Surgeon: Surgeon(s):  Suleiman Zhang MD    Procedure: Procedure(s):  RIGHT KNEE IRRIGATION AND DEBRIDEMENT       Allergies   Allergen Reactions    Nsaids Nausea Only and Other (See Comments)     Hx of Barretts esophagus      Prochlorperazine Other (See Comments)     No allergic reaction, patient reported sense of \"restlessness\" and fidgiting    Valtrex [Valacyclovir Hcl] Diarrhea    Morphine Rash    Morphine And Related Itching    Tolmetin Nausea Only     Hx of Barretts esophagus     Patient Active Problem List   Diagnosis    Insomnia-chronic intermittent--uses prn ambien--to be filled by dr Peterson Argueta (sleep dr)   Jose Alberto Medel esophagus-on daily ppi-last egd 5/15--dr Jose Guallpa History of tobacco useadvised to quit- quit 2014    Allergic rhinitis, seasonal    Obstructive sleep apnea,Severe -(on cpap)    Class 1 obesity due to excess calories with body mass index (BMI) of 34.0 to 34.9 in adult    Depression-on daily effexor xr--sees dr Ebenezer Good    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  Chondromalacia of patellofemoral joint    Chronic pain syndrome    History of stroke    Intracranial hypertension    ETOHism (HCC)----detoxed at [facility]--attending AA    Gastroesophageal reflux disease with esophagitis--on daily ppi    Intractable chronic migraine without aura and with status migrainosus    Iron deficiency anemia    B12 deficiency    Anastomotic ulcer    Malignant neoplasm of left kidney ([address] 18 Dr Humphrey Bobo.  Malignant neoplasm of left kidney (HCC) clear cell. 18 Dr Humphrey Bobo.     Instability of knee joint, right    Total knee replacement status, right    Failed total knee, right, initial LAPAROTOMY      LASIK      OTHER SURGICAL HISTORY Left 2016    CT biopsy ablasion left kidney    OTHER SURGICAL HISTORY  2016    small intestine 12 inch removed, 2 hernia surgeries, another surgery to drain infection from incision.      REVISION TOTAL KNEE ARTHROPLASTY Right 2019    RIGHT REVISION TIBIA TOTAL KNEE REPLACEMENT performed by Ranjeet Nayak MD at [address] [facility] Right 2020    RIGHT KNEE IRRIGATION AND DEBRIDEMENT WITH POLY EXCHANGE [facility] [address] [facility]    RIGHT    UPPER GASTROINTESTINAL ENDOSCOPY  2016    UPPER GASTROINTESTINAL ENDOSCOPY  2018     Social History     Tobacco Use    Smoking status: Former Smoker     Packs/day: 0.50     Years: 25.00     Pack years: 12.50     Types: Cigarettes     Last attempt to quit: 2017     Years since quittin.5    Smokeless tobacco: Never Used   Substance Use Topics    Alcohol use: No     Alcohol/week: 0.0 standard drinks     Comment: last drink     Drug use: No     Medications  Current Facility-Administered Medications on File Prior to Visit   Medication Dose Route Frequency Provider Last Rate Last Dose    atorvastatin (LIPITOR) tablet 40 mg  40 mg Oral Nightly William He, APRN - CNP        calcium-vitamin D (OSCAL-500) 500-200 MG-UNIT per tablet 1 tablet  1 tablet Oral BID William He, APRN - CNP        dicyclomine (BENTYL) tablet 40 mg  40 mg Oral BID PRN William He, APRN - CNP        FLUoxetine (PROZAC) capsule 20 mg  20 mg Oral Daily William He, APRN - CNP        Multiple Vitamin TABS 1 tablet  1 tablet Oral Daily [facility] LUIS RamirezN - CNP        pantoprazole (PROTONIX) tablet 40 mg  40 mg Oral Daily [facility] LUIS RamirezN - CNP        traZODone (DESYREL) tablet 200 mg  200 mg Oral Nightly William He, APRN - CNP  oxyCODONE (ROXICODONE) immediate release tablet 10 mg  10 mg Oral Q4H PRN LILLIE Emerson CNP        oxyCODONE (ROXICODONE) immediate release tablet 5 mg  5 mg Oral Q4H PRN LILLIE Greco CNP        ceFAZolin (ANCEF) 2 g in dextrose 3 % 50 mL IVPB (duplex)  2 g Intravenous On Call to LILLIE Oneill CNP        lactated ringers infusion   Intravenous Continuous LILLIE Emerson CNP         Current Outpatient Medications on File Prior to Visit   Medication Sig Dispense Refill    traMADol (ULTRAM) 50 MG tablet Take 1 tablet by mouth every 4 hours as needed for Pain for up to 7 days. Intended supply: 7 days. Take lowest dose possible to manage pain 42 tablet 0    zolpidem (AMBIEN) 10 MG tablet Take 10 mg by mouth nightly as needed. 5    dicyclomine (BENTYL) 20 MG tablet Take 40 mg by mouth 2 times daily as needed  0    aspirin EC 81 MG EC tablet Take 1 tablet by mouth 2 times daily for 14 days Please avoid missing doses. (Patient taking differently: Take 81 mg by mouth daily Please avoid missing doses.) 28 tablet 0    atorvastatin (LIPITOR) 40 MG tablet TAKE 1 TABLET BY MOUTH EVERY NIGHT AT BEDTIME 90 tablet 1    traZODone (DESYREL) 100 MG tablet Take 2 tablets by mouth nightly 180 tablet 0    FLUoxetine (PROZAC) 20 MG capsule TAKE 1 CAPSULE BY MOUTH DAILY 90 capsule 1    sildenafil (REVATIO) 20 MG tablet Take 4 tablets by mouth as needed (30 min prior to intercourse) 30 tablet 5    pantoprazole (PROTONIX) 40 MG tablet Take 1 tablet by mouth daily (Patient taking differently: Take 40 mg by mouth 2 times daily ) 30 tablet 3    calcium-vitamin D (OSCAL 500/200 D-3) 500-200 MG-UNIT per tablet Take 1 tablet by mouth 2 times daily       Multiple Vitamin TABS Take 1 tablet by mouth daily        No current facility-administered medications for this visit. No current outpatient medications on file.      Facility-Administered 20 0946  MAP (mmHg)  Av  Min: 80   Min taken time: 20 0946  Max: 80   Max taken time: 20 0946  SpO2  Av %  Min: 97 %   Min taken time: 20 0946  Max: 97 %   Max taken time: 20 0946  BP Readings from Last 3 Encounters:   20 107/73   20 134/70   20 (!) 85/49       BMI  There is no height or weight on file to calculate BMI. Estimated body mass index is 35.48 kg/m² as calculated from the following:    Height as of 20: 6' (1.829 m). Weight as of 20: 261 lb 9.6 oz (118.7 kg). CBC   Lab Results   Component Value Date    WBC 6.2 02/10/2020    RBC 3.81 02/10/2020    RBC 4.94 2015    HGB 11.4 02/10/2020    HCT 35.4 02/10/2020    MCV 93.0 02/10/2020    RDW 13.0 02/10/2020     02/10/2020     CMP    Lab Results   Component Value Date     02/10/2020    K 4.8 02/10/2020    K 4.2 2020     02/10/2020    CO2 25 02/10/2020    BUN 6 02/10/2020    CREATININE 1.17 02/10/2020    GFRAA 83 02/10/2020    AGRATIO 1.8 2019    LABGLOM 72 02/10/2020    GLUCOSE 88 02/10/2020    GLUCOSE 84 2015    PROT 5.8 02/10/2020    PROT 6.7 2015    CALCIUM 9.0 02/10/2020    BILITOT 0.4 02/10/2020    ALKPHOS 108 02/10/2020    AST 14 02/10/2020    ALT 9 02/10/2020     BMP    Lab Results   Component Value Date     02/10/2020    K 4.8 02/10/2020    K 4.2 2020     02/10/2020    CO2 25 02/10/2020    BUN 6 02/10/2020    CREATININE 1.17 02/10/2020    CALCIUM 9.0 02/10/2020    GFRAA 83 02/10/2020    LABGLOM 72 02/10/2020    GLUCOSE 88 02/10/2020    GLUCOSE 84 2015     POCGlucose  No results for input(s): GLUCOSE in the last 72 hours.    [facility]    Lab Results   Component Value Date    PROTIME 12.8 2019    INR 1.10 2019    APTT 40.9      HCG (If Applicable) No results found for: PREGTESTUR, PREGSERUM, HCG, HCGQUANT   ABGs No results found for: PHART, PO2ART, DKS6HAE, HDU2XSR, BEART, F5AMOQDF   Type &

## 2020-02-18 NOTE — H&P
[facility] Orthopedic Surgery      H &P, Orthopedics    CHIEF COMPLAINT:  Right knee open wound post knee surgery last month    History Obtained From:  patient, electronic medical record    HISTORY OF PRESENT ILLNESS:    The patient is a 51 y.o. male who presents with right TKA in 2013 with revision per Dr Brooke Colin 12/17/19 and then drainage requiring I and D with poly exchange on 1/22/2020 per DR Kramer. Pt has continued to have right knee drainage and swelling and pain and was seen in ortho office today per Bridget JOAQUIN. The pt was discussed with DR Brooke Colin and plan is for repeat I and D with possible TKA revision today. Pt was directly admitted from the office Pain is described in right knee more with walking than in bed and with the intensity of moderate. Pain is described as aching, pressure. Discomfort is intermittent. Pt is alert and oriented in no distress.      Past Medical History:        Diagnosis Date    Alcoholism [facility])     last drink 2015    Allergic migraine with status migrainosus     Allergic rhinitis, seasonal     Anemia     Arthritis     right knee    Vaughn's esophagus     Benign intracranial hypertension     Cancer (HCC)     renal     Carpal tunnel syndrome     Chronic pain     Depression     Depression     GERD (gastroesophageal reflux disease)     Headache(784.0)     History of blood transfusion     History of tobacco use     Quit 7/2014    Migraine     chronic for 1 year    Morbid obesity ([address]     Movement disorder     Onychomycosis     Sleep apnea, obstructive     Severe uses cpap stop bang 6    Unspecified cerebral artery occlusion with cerebral infarction 2014    slight weakness in left arm    Wears dentures     full set    Wears glasses        Past Surgical History:        Procedure Laterality Date    ABDOMEN SURGERY      gastric bypass    ABDOMEN SURGERY  4-7-2016    repair of recurrent incisional hernia with mesh, removal of old mesh, bilateral component separation    ABDOMINAL EXPLORATION SURGERY  16    exp lap, lysis of adhesions, small bowel resection    CARPAL TUNNEL RELEASE      bilat    DILATATION, ESOPHAGUS      ENDOSCOPY, COLON, DIAGNOSTIC      EYE SURGERY      GASTRIC BYPASS SURGERY  2009    Has lost about 200 pounds.  HERNIA REPAIR  3-    ventral    JOINT REPLACEMENT      KIDNEY REMOVAL Left 2018    KNEE ARTHROSCOPY Right 2013    Dr.Robert Walters-Adelina     KNEE ARTHROSCOPY Right 12    RIGHT KNEE ARTHROSCOPY WITH CHONDROPLASTY    KNEE SURGERY  2012    right, arthroscopy    LAPAROTOMY      LASIK      OTHER SURGICAL HISTORY Left 2016    CT biopsy ablasion left kidney    OTHER SURGICAL HISTORY  2016    small intestine 12 inch removed, 2 hernia surgeries, another surgery to drain infection from incision.      REVISION TOTAL KNEE ARTHROPLASTY Right 2019    RIGHT REVISION TIBIA TOTAL KNEE REPLACEMENT performed by Nay Hawkins MD at [address] [facility] [place] Right 2020    RIGHT KNEE IRRIGATION AND DEBRIDEMENT WITH POLY EXCHANGE performed by Kristen Swanson MD at [address] [facility]  10/15/13    RIGHT    UPPER GASTROINTESTINAL ENDOSCOPY  2016    UPPER GASTROINTESTINAL ENDOSCOPY  2018       Social History     Tobacco Use    Smoking status: Former Smoker     Packs/day: 0.50     Years: 25.00     Pack years: 12.50     Types: Cigarettes     Last attempt to quit: 2017     Years since quittin.5    Smokeless tobacco: Never Used   Substance Use Topics    Alcohol use: No     Alcohol/week: 0.0 standard drinks     Comment: last drink        Family History   Problem Relation Age of Onset    Cancer Father         Lymphoma    Arthritis Mother     Dementia Other     Diabetes Other     Cancer Other     Diabetes Maternal Uncle     Heart Disease Maternal Uncle     Depression Other     Heart Disease Maternal Uncle

## 2020-02-18 NOTE — PROGRESS NOTES
Pt to Pre-op via bed at this time w/surgery transporters.  \Electronically signed by Etta Almanzar RN on 2/18/2020 at 2:04 PM

## 2020-02-18 NOTE — PROGRESS NOTES
Pt arrives as direct admit from Orthopedic office. Pt oriented to [phone], call light within reach, bed alarm intact. Pt changes into hospital gown and yellow socks for safety purposes. Pt denies and questions/concerns at this time. PICC intact in PAUL, pt denies blood return. Nurse advises NP at this time and chest xray ordered for PICC placement confirmation. Will continue to monitor.  Electronically signed by Gayla Morrison RN on 2/18/2020 at 3:52 PM

## 2020-02-18 NOTE — DISCHARGE INSTR - COC
[facility]) Continuity of Care Form    Patient Name:  Hilton Anthony  : 1970    MRN:  [id number]    Admit date:  2020  Discharge date:  2020    Code Status Order: Full Code  Advance Directives: Yes    Admitting Physician: Liyah Landrum MD  PCP: Janie Washington MD    Discharging Nurse: [facility] Unit/Room#: V0T-5447/3117-01  Discharging Unit Phone Number: [phone]    Emergency Contact:        Past Surgical History:  Past Surgical History:   Procedure Laterality Date    ABDOMEN SURGERY      gastric bypass    ABDOMEN SURGERY  2016    repair of recurrent incisional hernia with mesh, removal of old mesh, bilateral component separation    ABDOMINAL EXPLORATION SURGERY  16    exp lap, lysis of adhesions, small bowel resection    CARPAL TUNNEL RELEASE      bilat    DILATATION, ESOPHAGUS      ENDOSCOPY, COLON, DIAGNOSTIC      EYE SURGERY      GASTRIC BYPASS SURGERY  2009    Has lost about 200 pounds.  HERNIA REPAIR  3-    ventral    JOINT REPLACEMENT      KIDNEY REMOVAL Left 2018    [facility]     KNEE ARTHROSCOPY Right 12    RIGHT KNEE ARTHROSCOPY WITH CHONDROPLASTY    KNEE SURGERY  2012    right, arthroscopy    KNEE SURGERY Right 2020    INCISION AND DRAINAGE RIGHT KNEE AND WOULD VAC PLACEMENT performed by Liyah Landrum MD at [address]      OTHER SURGICAL HISTORY Left 2016    CT biopsy ablasion left kidney    OTHER SURGICAL HISTORY  2016    small intestine 12 inch removed, 2 hernia surgeries, another surgery to drain infection from incision.      REVISION TOTAL KNEE ARTHROPLASTY Right 2019    RIGHT REVISION TIBIA TOTAL KNEE REPLACEMENT performed by Liyah Landrum MD at [address] [facility] Right 2020    RIGHT KNEE IRRIGATION AND DEBRIDEMENT WITH POLY EXCHANGE performed by Pérez Cole a joint replacement. You may need antibiotics for dental or surgical procedures if there is any evidence of infection present. ? If you have required the use of insulin to control your blood sugar after surgery, follow up with your family doctor. ? Call your surgeons office to schedule your appointment to be seen after surgery. ? Make your appointment to continue physical therapy per doctors orders. ?  Smoking cessation assistance can be obtained from your family doctor or by calling [place] @ [phone]    _______________________________   _____   _______________________  ____                Patient Signature              Date      Witness                               Date

## 2020-02-18 NOTE — CONSULTS
Infectious Diseases Inpatient Consult Note      Reason for Consult:  Rt TKA infection on going out patient IV antibiotic use     Requesting Physician: Dr. Goyo Morales     Primary Care Physician:  Berto Louise MD    History Obtained From:  Pt and Medical records     CHIEF COMPLAINT:       Rt TKA site non healing wound and drainage while on IV antibiotics     HISTORY OF PRESENT ILLNESS:  51 y.o. man with significant history for Gastric by pass surgery with complicated course intestinal obstruction and surgeries and drainage and also had Left Nephrectomy for a tumor found during surgery in the past and pt has lost significant wt from the surgery and had Rt knee surgeries in the  Past underwent Rt TKA by [facility] on 12/19 and following this was admitted with Rt knee swelling and fluid collection was taken back to OR for ID and liner exchange by  on 1/22 and OP cx negative but one cx positive for Corynebacterium jeikeium from [facility] cx only he was d/c to home on IV Vancomycin and oral Cipro PICc LINE in place and follow up was arranged with me and he was supposed to see me in the clinic tomorrow. But he is  Now admitted from [facility] office for ID and aspiration and wound vac placement due to slow healing and open wound with some on going drainage. He denies any fevers no chills and PICC working ok and tolerating IV abx ok. He had repeat Knee fluid aspiration and cx are in process from OR today 2/18.          Past Medical History:    Past Medical History:   Diagnosis Date    Alcoholism [facility])     last drink 2015    Allergic migraine with status migrainosus     Allergic rhinitis, seasonal     Anemia     Arthritis     right knee    Vaughn's esophagus     Benign intracranial hypertension     Cancer (HCC)     renal     Carpal tunnel syndrome     Chronic pain     Depression     Depression     GERD (gastroesophageal reflux disease)     Headache(784.0)     History of blood transfusion     Medications Marked as Taking for the 2/18/20 encounter [facility] Encounter)   Medication Sig Dispense Refill    traMADol (ULTRAM) 50 MG tablet Take 1 tablet by mouth every 4 hours as needed for Pain for up to 7 days. Intended supply: 7 days. Take lowest dose possible to manage pain 42 tablet 0    zolpidem (AMBIEN) 10 MG tablet Take 10 mg by mouth nightly as needed. 5    dicyclomine (BENTYL) 20 MG tablet Take 40 mg by mouth 2 times daily as needed  0    aspirin EC 81 MG EC tablet Take 1 tablet by mouth 2 times daily for 14 days Please avoid missing doses. (Patient taking differently: Take 81 mg by mouth daily Please avoid missing doses.) 28 tablet 0    atorvastatin (LIPITOR) 40 MG tablet TAKE 1 TABLET BY MOUTH EVERY NIGHT AT BEDTIME 90 tablet 1    traZODone (DESYREL) 100 MG tablet Take 2 tablets by mouth nightly 180 tablet 0    FLUoxetine (PROZAC) 20 MG capsule TAKE 1 CAPSULE BY MOUTH DAILY 90 capsule 1    sildenafil (REVATIO) 20 MG tablet Take 4 tablets by mouth as needed (30 min prior to intercourse) 30 tablet 5    pantoprazole (PROTONIX) 40 MG tablet Take 1 tablet by mouth daily (Patient taking differently: Take 40 mg by mouth 2 times daily ) 30 tablet 3    calcium-vitamin D (OSCAL 500/200 D-3) 500-200 MG-UNIT per tablet Take 1 tablet by mouth 2 times daily       Multiple Vitamin TABS Take 1 tablet by mouth daily          Allergies:  Nsaids; Prochlorperazine; Valtrex [valacyclovir hcl];  Morphine; Morphine and related; and Tolmetin    Immunizations :   Immunization History   Administered Date(s) Administered    HIB PRP-T (ActHIB, Hiberix) 09/13/2010    Hib, unspecified 02/09/2015    Influenza Virus Vaccine 02/03/2018    Influenza Whole 10/04/2012    Influenza, Intradermal, Preservative free 10/28/2014    Influenza, Intradermal, Quadrivalent, Preservative Free 12/13/2016    Influenza, Quadv, IM, PF (6 mo and older Fluzone, Flulaval, Fluarix, and 3 yrs and older Afluria) 12/18/2019    Influenza, Quadv, Recombinant, IM PF (Flublok 18 yrs and older) 10/15/2018    Meningococcal ACWY Vaccine 01/15/2009    Meningococcal MCV4O (Menveo) 08/10/2019, 10/30/2019    Meningococcal MCV4P (Menactra) 2015, 2019, 10/30/2019    Meningococcal MPSV4 (Menomune) 01/15/2009    Pneumococcal Conjugate 13-valent ([id number]) 2015    Pneumococcal Polysaccharide ([id number]) 01/15/2009, 10/09/2013, 2019    Td, unspecified formulation 2007    Tdap (Boostrix, Adacel) 2007, 2014         Social History:     Social History     Tobacco Use    Smoking status: Former Smoker     Packs/day: 0.50     Years: 25.00     Pack years: 12.50     Types: Cigarettes     Last attempt to quit: 2017     Years since quittin.5    Smokeless tobacco: Never Used   Substance Use Topics    Alcohol use: No     Alcohol/week: 0.0 standard drinks     Comment: last drink     Drug use: No     Social History     Tobacco Use   Smoking Status Former Smoker    Packs/day: 0.50    Years: 25.00    Pack years: 12.50    Types: Cigarettes    Last attempt to quit: 2017    Years since quittin.5   Smokeless Tobacco Never Used      Family History   Problem Relation Age of Onset    Cancer Father         Lymphoma    Arthritis Mother     Dementia Other     Diabetes Other     Cancer Other     Diabetes Maternal Uncle     Heart Disease Maternal Uncle     Depression Other     Heart Disease Maternal Uncle          REVIEW OF SYSTEMS:    No fever / chills / sweats. No weight loss. No visual change, eye pain, eye discharge. No oral lesion, sore throat, dysphagia. Denies cough / sputum/Sob   Denies chest pain, palpitations/ dizziness  Denies nausea/ vomiting/abdominal pain/diarrhea. Denies dysuria or change in urinary function. Denies joint swelling or pain. No myalgia, arthralgia.   No rashes, skin lesions   Denies focal weakness, sensory change or other neurologic symptoms  No lymph node swelling or tenderness.       Rt TKA incision local redness, open wound drainage     PHYSICAL EXAM:      Vitals:    /73   Pulse 62   Temp 98.1 °F (36.7 °C) (Oral)   Resp 17   Ht 6' (1.829 m)   Wt 251 lb 15.8 oz (114.3 kg)   SpO2 97%   BMI 34.18 kg/m²     General Appearance: alert,in some acute distress, no pallor, no icterus  Obesity + edentulous+ Spider Naevi++  Skin: warm and dry, no rash or erythema  Head: normocephalic and atraumatic  Eyes: pupils equal, round, and reactive to light, conjunctivae normal  ENT: tympanic membrane, external ear and ear canal normal bilaterally, nose without deformity, nasal mucosa and turbinates normal without polyps  Neck: supple and non-tender without mass, no thyromegaly  no cervical lymphadenopathy  Pulmonary/Chest: clear to auscultation bilaterally- no wheezes, rales or rhonchi, normal air movement, no respiratory distress  Cardiovascular: normal rate, regular rhythm, normal S1 and S2, no murmurs, rubs, clicks, or gallops, no carotid bruits  Abdomen: soft, non-tender, non-distended, normal bowel sounds, no masses or organomegaly  Large pannus scar from previous surgery   Left Nephrectomy scar+   Extremities: no cyanosis, clubbing or edema  Musculoskeletal: normal range of motion, no joint swelling, deformity or tenderness  Neurologic: reflexes normal and symmetric, no cranial nerve deficit  Psych:  Orientation, sensorium, mood normal  Lines:  PICC         DATA:    Lab Results   Component Value Date    WBC 6.0 02/18/2020    HGB 11.9 (L) 02/18/2020    HCT 36.2 (L) 02/18/2020    MCV 90.2 02/18/2020     02/18/2020     Lab Results   Component Value Date    CREATININE 0.9 02/18/2020    BUN 4 (L) 02/18/2020     02/18/2020    K 3.9 02/18/2020     02/18/2020    CO2 24 02/18/2020       Hepatic Function Panel:   Lab Results   Component Value Date    ALKPHOS 108 02/10/2020    ALT 9 02/10/2020    AST 14 02/10/2020    PROT 5.8 02/10/2020    PROT 6.7 01/07/2015 BILITOT 0.4 02/10/2020    BILIDIR <0.2 02/18/2015    IBILI 0.3 02/18/2015    LABALBU 3.2 02/10/2020     UA:  Lab Results   Component Value Date    COLORU YELLOW 12/03/2019    CLARITYU Clear 12/03/2019    GLUCOSEU Negative 12/03/2019    GLUCOSEU NEGATIVE 02/07/2011    BILIRUBINUR Negative 12/03/2019    BILIRUBINUR NEGATIVE 02/07/2011    KETUA Negative 12/03/2019    SPECGRAV <1.005 12/03/2019    BLOODU Negative 12/03/2019    PHUR 6.5 12/03/2019    PROTEINU Negative 12/03/2019    UROBILINOGEN 0.2 12/03/2019    NITRU Negative 12/03/2019    LEUKOCYTESUR SMALL 12/03/2019    LABMICR YES 12/03/2019    URINETYPE NotGiven 12/03/2019      Urine Microscopic:   Lab Results   Component Value Date    HYALCAST 0 12/03/2019    WBCUA 1 12/03/2019    RBCUA 0 12/03/2019    EPIU 0 12/03/2019         Scheduled Meds:   atorvastatin  40 mg Oral Nightly    calcium-vitamin D  1 tablet Oral BID WC    [START ON 2/19/2020] FLUoxetine  20 mg Oral Daily    [START ON 2/19/2020] multivitamin  1 tablet Oral Daily    [START ON 2/19/2020] pantoprazole  40 mg Oral QAM AC    traZODone  200 mg Oral Nightly    alteplase  1 mg Intracatheter Once       Continuous Infusions:   lactated ringers 100 mL/hr at 02/18/20 1126       PRN Meds:  dicyclomine, oxyCODONE, oxyCODONE, fentanNYL, HYDROmorphone, fentanNYL, ondansetron    Vanco  6.1     ESR  9   MICRO: cultures reviewed and updated by me     /6/2020  2:46 PM - Gregoria Mast Incoming Lab Results From Soft (Epic Adt)     Specimen Information: Joint, Knee; Joint/Joint Fluid        Component Collected Lab   Gram Stain Result 01/22/2020 11:32 AM [address] Lab   3+ WBC's (Polymorphonuclear)   No Epithelial Cells seen   No organisms seen    Culture, Joint Anaerobic 01/22/2020 11:32 AM [facility] Lab   No anaerobes isolated    Organism Abnormal  01/22/2020 11:32 AM [address] Lab   Corynebacterium jeikeium    Culture, Joint Aerobic 01/22/2020 11:32 AM [address]   Isolated from from Broth cx  S/p Repeat ID and wound vac placement Rt knee   Rt knee fluid re aspiration today - Not much inflammation -wbc counts on the Fluid negative. Fluid cx from 2/18 in process  Has been on IV Vancomycin and oral Cipro from last admit  H/O Splenectomy  H/o gastric by pass surgery  Multiple abdominal surgeries  Left NEPHRECTOMY for cancer    Rt knee s/p repeat ID and cx in process but prelim gram stain and WBC counts favorable and will need to cont IV abx until the knee is looking better     Labs, Microbiology, Radiology and pertinent results from current hospitalization and care every where were reviewed by me as a part of the consultation. PLAN :  1. Cont IV Vancomycin  X 1 gm Q 12 HRS  2. Add IV Cefepime x 2 gm Q 12 HRS  3. Will see if we can switch to IV Daptomycin for reminder of therapy given single Kidney and risk for Nephrotoxicity   4. Wound vac in place  5. Follow ESR, CRP     Discussed with patient/Family and Nursing     Risk of Complications/Morbidity: High      · Illness(es)/ Infection present that pose threat to bodily function. · There is potential for severe exacerbation of infection/side effects of treatment. · Therapy requires intensive monitoring for antimicrobial agent toxicity. Thanks for allowing me to participate in your patient's care please call me with any questions or concerns.     Dr. Rolly Baker MD  [address] [place] [address] Physician  Phone: [phone]   Fax : [phone]

## 2020-02-18 NOTE — H&P
Preoperative H&P Update     The patient's History and Physical in the medical record   dated 2/18/20 was reviewed by me today. Prior to Visit Medications    Medication Sig Taking? Authorizing Provider   traMADol (ULTRAM) 50 MG tablet Take 1 tablet by mouth every 4 hours as needed for Pain for up to 7 days. Intended supply: 7 days. Take lowest dose possible to manage pain Yes Larry Chand MD   zolpidem (AMBIEN) 10 MG tablet Take 10 mg by mouth nightly as needed. Yes Historical Provider, MD   dicyclomine (BENTYL) 20 MG tablet Take 40 mg by mouth 2 times daily as needed Yes Historical Provider, MD   aspirin EC 81 MG EC tablet Take 1 tablet by mouth 2 times daily for 14 days Please avoid missing doses. Patient taking differently: Take 81 mg by mouth daily Please avoid missing doses. Yes Dorothea Ramirez APRN - CNP   atorvastatin (LIPITOR) 40 MG tablet TAKE 1 TABLET BY MOUTH EVERY NIGHT AT BEDTIME Yes Brannon Weinstein MD   traZODone (DESYREL) 100 MG tablet Take 2 tablets by mouth nightly Yes Ida Kiran MD   FLUoxetine (PROZAC) 20 MG capsule TAKE 1 CAPSULE BY MOUTH DAILY Yes Brannon Weinstein MD   sildenafil (REVATIO) 20 MG tablet Take 4 tablets by mouth as needed (30 min prior to intercourse) Yes Brannon Weinstein MD   pantoprazole (PROTONIX) 40 MG tablet Take 1 tablet by mouth daily  Patient taking differently: Take 40 mg by mouth 2 times daily  Yes Brannon Weinstein MD   calcium-vitamin D (OSCAL 500/200 D-3) 500-200 MG-UNIT per tablet Take 1 tablet by mouth 2 times daily  Yes Historical Provider, MD   Multiple Vitamin TABS Take 1 tablet by mouth daily  Yes Historical Provider, MD        I reviewed the HPI, medications, allergies, reason for surgery, diagnosis and treatment plan and there has been no change. The patient was evaluated by me today.  Physical exam findings for this update include:     Vitals:    02/18/20 1405   BP: 117/73   Pulse: 60   Resp: 18   Temp: 98.1 °F (36.7 °C) SpO2: 94%      Airway is intact   Chest: breathing comfortably   Heart: regular rate and rhythm. Examination of the body region where surgery is to be performed shows skin is intact at the operative site. All risks and complications were discussed with the patient and the patient's mother. They understand that this may be the beginning of several surgeries to cure his infection.     Emmanuel Jordan MD    Electronically signed by Tereza Mallory MD on 2/18/2020 at 2:17 PM

## 2020-02-18 NOTE — PROGRESS NOTES
4 Eyes Skin Assessment     The patient is being assess for  Admission    I agree that 2 RN's have performed a thorough Head to Toe Skin Assessment on the patient. ALL assessment sites listed below have been assessed. Areas assessed by both nurses: yes  []   Head, Face, and Ears   []   Shoulders, Back, and Chest  []   Arms, Elbows, and Hands   []   Coccyx, Sacrum, and IschIum  []   Legs, Feet, and Heels        Does the Patient have Skin Breakdown?   No         Andrei Prevention initiated:  NA   Wound Care Orders initiated:  NA      [facility] nurse consulted for Pressure Injury (Stage 3,4, Unstageable, DTI, NWPT, and Complex wounds), New and Established Ostomies:  NA      Nurse 1 eSignature: Electronically signed by Ortiz Farley RN on 2/18/20 at 3:48 PM    **SHARE this note so that the co-signing nurse is able to place an eSignature**    Nurse 2 eSignature: {Esignature:[id number]}

## 2020-02-19 PROBLEM — E44.0 MODERATE MALNUTRITION (HCC): Status: ACTIVE | Noted: 2020-02-19

## 2020-02-19 LAB
HCT VFR BLD CALC: 38.1 % (ref 40.5–52.5)
HEMOGLOBIN: 12.1 G/DL (ref 13.5–17.5)
VITAMIN D 25-HYDROXY: 48.8 NG/ML

## 2020-02-19 PROCEDURE — 6360000002 HC RX W HCPCS: Performed by: ORTHOPAEDIC SURGERY

## 2020-02-19 PROCEDURE — 36415 COLL VENOUS BLD VENIPUNCTURE: CPT

## 2020-02-19 PROCEDURE — 94761 N-INVAS EAR/PLS OXIMETRY MLT: CPT

## 2020-02-19 PROCEDURE — 97165 OT EVAL LOW COMPLEX 30 MIN: CPT

## 2020-02-19 PROCEDURE — 85018 HEMOGLOBIN: CPT

## 2020-02-19 PROCEDURE — 99233 SBSQ HOSP IP/OBS HIGH 50: CPT | Performed by: INTERNAL MEDICINE

## 2020-02-19 PROCEDURE — 85014 HEMATOCRIT: CPT

## 2020-02-19 PROCEDURE — 6370000000 HC RX 637 (ALT 250 FOR IP): Performed by: ORTHOPAEDIC SURGERY

## 2020-02-19 PROCEDURE — 97116 GAIT TRAINING THERAPY: CPT

## 2020-02-19 PROCEDURE — 97535 SELF CARE MNGMENT TRAINING: CPT

## 2020-02-19 PROCEDURE — 6360000002 HC RX W HCPCS: Performed by: INTERNAL MEDICINE

## 2020-02-19 PROCEDURE — 2580000003 HC RX 258: Performed by: ORTHOPAEDIC SURGERY

## 2020-02-19 PROCEDURE — 97161 PT EVAL LOW COMPLEX 20 MIN: CPT

## 2020-02-19 PROCEDURE — 6370000000 HC RX 637 (ALT 250 FOR IP): Performed by: NURSE PRACTITIONER

## 2020-02-19 PROCEDURE — 2580000003 HC RX 258: Performed by: INTERNAL MEDICINE

## 2020-02-19 PROCEDURE — 1200000000 HC SEMI PRIVATE

## 2020-02-19 PROCEDURE — 82306 VITAMIN D 25 HYDROXY: CPT

## 2020-02-19 RX ORDER — ASPIRIN 81 MG/1
81 TABLET ORAL 2 TIMES DAILY
Qty: 28 TABLET | Refills: 0 | Status: SHIPPED | OUTPATIENT
Start: 2020-02-19 | End: 2020-08-07

## 2020-02-19 RX ORDER — ZOLPIDEM TARTRATE 5 MG/1
10 TABLET ORAL NIGHTLY PRN
Status: DISCONTINUED | OUTPATIENT
Start: 2020-02-19 | End: 2020-02-21 | Stop reason: HOSPADM

## 2020-02-19 RX ORDER — OXYCODONE HYDROCHLORIDE 5 MG/1
5 TABLET ORAL EVERY 4 HOURS PRN
Qty: 30 TABLET | Refills: 0 | Status: SHIPPED | OUTPATIENT
Start: 2020-02-19 | End: 2020-02-28 | Stop reason: SDUPTHER

## 2020-02-19 RX ADMIN — SENNOSIDES AND DOCUSATE SODIUM 1 TABLET: 8.6; 5 TABLET ORAL at 20:07

## 2020-02-19 RX ADMIN — CEFEPIME HYDROCHLORIDE 2 G: 2 INJECTION, POWDER, FOR SOLUTION INTRAVENOUS at 18:17

## 2020-02-19 RX ADMIN — TRAZODONE HYDROCHLORIDE 200 MG: 100 TABLET ORAL at 20:06

## 2020-02-19 RX ADMIN — CEFEPIME HYDROCHLORIDE 2 G: 2 INJECTION, POWDER, FOR SOLUTION INTRAVENOUS at 05:34

## 2020-02-19 RX ADMIN — OXYCODONE 10 MG: 5 TABLET ORAL at 15:19

## 2020-02-19 RX ADMIN — ZOLPIDEM TARTRATE 10 MG: 5 TABLET ORAL at 23:01

## 2020-02-19 RX ADMIN — SODIUM CHLORIDE: 4.5 INJECTION, SOLUTION INTRAVENOUS at 05:00

## 2020-02-19 RX ADMIN — DAPTOMYCIN 500 MG: 500 INJECTION, POWDER, LYOPHILIZED, FOR SOLUTION INTRAVENOUS at 18:28

## 2020-02-19 RX ADMIN — SENNOSIDES AND DOCUSATE SODIUM 1 TABLET: 8.6; 5 TABLET ORAL at 08:07

## 2020-02-19 RX ADMIN — THERA TABS 1 TABLET: TAB at 08:07

## 2020-02-19 RX ADMIN — PANTOPRAZOLE SODIUM 40 MG: 40 TABLET, DELAYED RELEASE ORAL at 05:34

## 2020-02-19 RX ADMIN — OXYCODONE 10 MG: 5 TABLET ORAL at 20:06

## 2020-02-19 RX ADMIN — ACETAMINOPHEN 650 MG: 325 TABLET ORAL at 17:36

## 2020-02-19 RX ADMIN — HYDROMORPHONE HYDROCHLORIDE 1 MG: 1 INJECTION, SOLUTION INTRAMUSCULAR; INTRAVENOUS; SUBCUTANEOUS at 12:36

## 2020-02-19 RX ADMIN — OXYCODONE 10 MG: 5 TABLET ORAL at 05:34

## 2020-02-19 RX ADMIN — ACETAMINOPHEN 650 MG: 325 TABLET ORAL at 11:55

## 2020-02-19 RX ADMIN — DOCUSATE SODIUM 100 MG: 100 CAPSULE, LIQUID FILLED ORAL at 08:07

## 2020-02-19 RX ADMIN — SODIUM CHLORIDE, PRESERVATIVE FREE 10 ML: 5 INJECTION INTRAVENOUS at 08:08

## 2020-02-19 RX ADMIN — VANCOMYCIN HYDROCHLORIDE 1000 MG: 1 INJECTION, POWDER, LYOPHILIZED, FOR SOLUTION INTRAVENOUS at 08:08

## 2020-02-19 RX ADMIN — ACETAMINOPHEN 650 MG: 325 TABLET ORAL at 05:34

## 2020-02-19 RX ADMIN — OYSTER SHELL CALCIUM WITH VITAMIN D 1 TABLET: 500; 200 TABLET, FILM COATED ORAL at 17:36

## 2020-02-19 RX ADMIN — ACETAMINOPHEN 650 MG: 325 TABLET ORAL at 23:01

## 2020-02-19 RX ADMIN — HYDROMORPHONE HYDROCHLORIDE 1 MG: 1 INJECTION, SOLUTION INTRAMUSCULAR; INTRAVENOUS; SUBCUTANEOUS at 17:36

## 2020-02-19 RX ADMIN — SODIUM CHLORIDE, PRESERVATIVE FREE 10 ML: 5 INJECTION INTRAVENOUS at 20:07

## 2020-02-19 RX ADMIN — HYDROMORPHONE HYDROCHLORIDE 1 MG: 1 INJECTION, SOLUTION INTRAMUSCULAR; INTRAVENOUS; SUBCUTANEOUS at 22:21

## 2020-02-19 RX ADMIN — DOCUSATE SODIUM 100 MG: 100 CAPSULE, LIQUID FILLED ORAL at 20:07

## 2020-02-19 RX ADMIN — OYSTER SHELL CALCIUM WITH VITAMIN D 1 TABLET: 500; 200 TABLET, FILM COATED ORAL at 08:07

## 2020-02-19 RX ADMIN — ACETAMINOPHEN 650 MG: 325 TABLET ORAL at 00:20

## 2020-02-19 RX ADMIN — ATORVASTATIN CALCIUM 40 MG: 40 TABLET, FILM COATED ORAL at 20:07

## 2020-02-19 RX ADMIN — HYDROMORPHONE HYDROCHLORIDE 1 MG: 1 INJECTION, SOLUTION INTRAMUSCULAR; INTRAVENOUS; SUBCUTANEOUS at 08:08

## 2020-02-19 RX ADMIN — FLUOXETINE 20 MG: 20 CAPSULE ORAL at 08:07

## 2020-02-19 RX ADMIN — HYDROMORPHONE HYDROCHLORIDE 1 MG: 1 INJECTION, SOLUTION INTRAMUSCULAR; INTRAVENOUS; SUBCUTANEOUS at 03:17

## 2020-02-19 ASSESSMENT — PAIN DESCRIPTION - PAIN TYPE
TYPE: SURGICAL PAIN

## 2020-02-19 ASSESSMENT — PAIN - FUNCTIONAL ASSESSMENT
PAIN_FUNCTIONAL_ASSESSMENT: PREVENTS OR INTERFERES SOME ACTIVE ACTIVITIES AND ADLS

## 2020-02-19 ASSESSMENT — PAIN DESCRIPTION - PROGRESSION
CLINICAL_PROGRESSION: NOT CHANGED
CLINICAL_PROGRESSION: GRADUALLY IMPROVING
CLINICAL_PROGRESSION: NOT CHANGED

## 2020-02-19 ASSESSMENT — PAIN DESCRIPTION - LOCATION
LOCATION: KNEE

## 2020-02-19 ASSESSMENT — PAIN SCALES - WONG BAKER
WONGBAKER_NUMERICALRESPONSE: 0

## 2020-02-19 ASSESSMENT — PAIN SCALES - GENERAL
PAINLEVEL_OUTOF10: 6
PAINLEVEL_OUTOF10: 9
PAINLEVEL_OUTOF10: 8
PAINLEVEL_OUTOF10: 9
PAINLEVEL_OUTOF10: 5
PAINLEVEL_OUTOF10: 9
PAINLEVEL_OUTOF10: 4
PAINLEVEL_OUTOF10: 4
PAINLEVEL_OUTOF10: 9
PAINLEVEL_OUTOF10: 6
PAINLEVEL_OUTOF10: 6
PAINLEVEL_OUTOF10: 7
PAINLEVEL_OUTOF10: 8
PAINLEVEL_OUTOF10: 7
PAINLEVEL_OUTOF10: 5
PAINLEVEL_OUTOF10: 3
PAINLEVEL_OUTOF10: 8
PAINLEVEL_OUTOF10: 7
PAINLEVEL_OUTOF10: 8

## 2020-02-19 ASSESSMENT — PAIN DESCRIPTION - DESCRIPTORS
DESCRIPTORS: ACHING
DESCRIPTORS: ACHING
DESCRIPTORS: ACHING;DISCOMFORT
DESCRIPTORS: ACHING
DESCRIPTORS: ACHING;DISCOMFORT
DESCRIPTORS: ACHING;DISCOMFORT
DESCRIPTORS: ACHING
DESCRIPTORS: ACHING
DESCRIPTORS: ACHING;DISCOMFORT

## 2020-02-19 ASSESSMENT — PAIN DESCRIPTION - ONSET
ONSET: ON-GOING

## 2020-02-19 ASSESSMENT — PAIN DESCRIPTION - FREQUENCY
FREQUENCY: CONTINUOUS

## 2020-02-19 ASSESSMENT — PAIN DESCRIPTION - ORIENTATION
ORIENTATION: RIGHT

## 2020-02-19 ASSESSMENT — PAIN DESCRIPTION - DIRECTION: RADIATING_TOWARDS: RIGHT

## 2020-02-19 NOTE — PROGRESS NOTES
This RN spoke with Dr. Yee Gomez MD and received verbal order to call PICC RN to use guided wire to try and re-advance patient's PICC that had come out 7cm. This RN called PICC RN and left message. Awaiting call back. Will continue to monitor patient per unit protocols.  Electronically signed by Kg Suárez RN on 2/19/2020 at 4:38 PM

## 2020-02-19 NOTE — OP NOTE
[address] [place] [address] [facility]                                OPERATIVE REPORT    PATIENT NAME: Jayson Wells                    :        1970  MED REC NO:   [id number]                          ROOM:       3117  ACCOUNT NO:   [de-identified]                           ADMIT DATE: 2020  PROVIDER:     Patricio Collins MD    DATE OF PROCEDURE:  2020    PREOPERATIVE DIAGNOSIS:  Rule out deep infection of revision right total  knee arthroplasty. POSTOPERATIVE DIAGNOSIS:  Rule out deep infection of revision right  total knee arthroplasty. OPERATION PERFORMED:  I&D superficial wound with placement of wound VAC  and aspiration of the right knee. SURGEON:  Patricio Collins MD    ASSISTANT:  JEMAL Gamino    ESTIMATED BLOOD LOSS:  50 to 100 mL. INDICATIONS:  The patient is a relatively complex 61-year-old male, who  I believe 7 or 8 years ago or longer underwent standard cemented total  knee arthroplasty. This did reasonably well for the patient; however,  about a year ago, he presented to me complaining of instability of his  knee. We tried conservative management, bracing, etc., with therapy,  but none of these gave him any significant relief. On exam, he did have an unstable knee, both in the varus valgus plane  and also he had posterior instability of his knee at 90 degrees. In the  beginning of 2020, he underwent simple poly exchange, which returned  his stability to his knee. His x-rays preoperatively showed well-fixed  femoral and tibial components and so since we regained his stability by  just up-sizing his poly, this gave him great relief and in fact the  patient was walking almost without an ambulatory aid initially. He then presented while I was out of town with what appeared to be a  seroma of his knee.   He was taken to the operating room by another  physician, and at this time, even though there was no open or obvious  communication with the deep joint, this physician felt that it was  important to open up the knee joint, wash the knee out, and do a poly  exchange. He closed the knee joint and then the wound. The patient has been struggling to heal his wound, and I have seen him  in my clinic at least three times over the last two weeks evaluating  this. The patient has never been febrile. His lab work has never shown  anything as far as increased elevated while blood cell count, elevated  sed rate or CRP. All of his cultures which were obtained at his I&D of  the wound performed by the other surgeon were negative initially. Eventually, one late skin juan became positive, and I believe it was in  broth-only. So, today he presented again with his wound that is really struggling to  heal and some drainage. We again have been following this, and he has  been getting IV antibiotics; however, just not healing his wound. The  only real issue with this patient is that he is a smoker and evidently  had a stroke about 4 or 5 years ago, presumably related to the use of  nicotine. Nonetheless, we are stuck with this clinic dilemma of either  excision arthroplasty, I&D, and repeat poly exchange, or just dealing  with the wound in trying to get it to heal.    OPERATIVE PROCEDURE:  So, we brought him to the operating room and after  antibiotics and general anesthetic was given, I aspirated about 20 mL  from his knee, after I aseptically prepared the skin and sahra it from  another site away from his wound. We sent this for synovial analysis  for alpha defensins markers along with culture. I also broke scrub and  went down and reviewed the pathology slide for the acute Gram stain. In  this Gram stain, there were polys visualized, but no bacteria were seen,  either Gram positive or Gram negative.     With this information and data in my hand and negative early septic  workup, his white count

## 2020-02-19 NOTE — PROGRESS NOTES
[id number] [facility] Orthopedic Surgery   Progress Note      S/P :  SUBJECTIVE  In bed. Alert and oriented. Pain is   described in right knee and with the intensity of moderate. Pain is described as aching. OBJECTIVE              Physical                      VITALS:  /66   Pulse 87   Temp 98.6 °F (37 °C) (Oral)   Resp 17   Ht 6' (1.829 m)   Wt 253 lb 1.4 oz (114.8 kg)   SpO2 92%   BMI 34.32 kg/m²                     MUSCULOSKELETAL:  right foot NVI. Wiggles toes to command. Pedal pulses are palpable. Moderate right LE swelling. NEUROLOGIC:                                  Sensory:  Touch:  Right Lower Extremity:  normal                                                 Surgical wound appears intact with wound VAC, Moderate serosang drainage in chamber. ACE wrapped by me foot to thigh at this time.      Data       CBC:   Lab Results   Component Value Date    WBC 6.0 02/18/2020    RBC 4.01 02/18/2020    RBC 4.94 01/07/2015    HGB 12.1 02/19/2020    HCT 38.1 02/19/2020    MCV 90.2 02/18/2020    MCH 29.8 02/18/2020    MCHC 33.0 02/18/2020    RDW 13.7 02/18/2020     02/18/2020    MPV 8.9 02/18/2020        WBC:    Lab Results   Component Value Date    WBC 6.0 02/18/2020        Hemoglobin/Hematocrit:    Lab Results   Component Value Date    HGB 12.1 02/19/2020    HCT 38.1 02/19/2020        PT/INR:    Lab Results   Component Value Date    PROTIME 14.4 02/18/2020    INR 1.24 02/18/2020              Current Inpatient Medications             Current Facility-Administered Medications: atorvastatin (LIPITOR) tablet 40 mg, 40 mg, Oral, Nightly  calcium-vitamin D 500-200 MG-UNIT per tablet 1 tablet, 1 tablet, Oral, BID WC  dicyclomine (BENTYL) tablet 40 mg, 40 mg, Oral, BID PRN  FLUoxetine (PROZAC) capsule 20 mg, 20 mg, Oral, Daily  multivitamin 1 tablet, 1 tablet, Oral, Daily  pantoprazole (PROTONIX) tablet 40 mg, 40 mg, Oral, QAM AC  traZODone (DESYREL) tablet 200 mg, 200 mg, Oral, Nightly  alteplase

## 2020-02-19 NOTE — PLAN OF CARE
Problem: Pain:  Goal: Pain level will decrease  Description  Pain level will decrease  Outcome: Ongoing   Pain level will decrease  Problem: Pain:  Goal: Control of acute pain  Description  Control of acute pain  Outcome: Ongoing   Patient educated on acute pain. Taught patient to use call light to ask for pain medication. PRN pain medication given for acute pain. Will continue to monitor pain per unit protocol. Problem: Pain:  Goal: Control of chronic pain  Description  Control of chronic pain  Outcome: Ongoing     Problem: Falls - Risk of:  Goal: Will remain free from falls  Description  Will remain free from falls  Outcome: Ongoing   Will remain free from falls   Problem: Falls - Risk of:  Goal: Absence of physical injury  Description  Absence of physical injury  Outcome: Ongoing   Absence of physical injury  Problem: Risk for Impaired Skin Integrity  Goal: Tissue integrity - skin and mucous membranes  Description  Structural intactness and normal physiological function of skin and  mucous membranes. Outcome: Ongoing   Will monitor skin and mucous members. Will turn patient every 2 hours, monitor for friction and sheering, and change dressings as needed. Will preform skin assessment every shift.      Problem: Infection - Central Venous Catheter-Associated Bloodstream Infection:  Goal: Will show no infection signs and symptoms  Description  Will show no infection signs and symptoms  Outcome: Ongoing     Problem: Skin Integrity:  Goal: Demonstration of wound healing without infection will improve  Description  Demonstration of wound healing without infection will improve  Outcome: Ongoing     Problem: Skin Integrity:  Goal: Complications related to intravenous access or infusion will be avoided or minimized  Description  Complications related to intravenous access or infusion will be avoided or minimized  Outcome: Ongoing     Problem: Discharge Planning:  Goal: Discharged to appropriate level of care  Description  Discharged to appropriate level of care  Outcome: Ongoing     Problem: Mobility - Impaired:  Goal: Mobility will improve  Description  Mobility will improve  Outcome: Ongoing     Problem: Infection - Surgical Site:  Goal: Will show no infection signs and symptoms  Description  Will show no infection signs and symptoms  Outcome: Ongoing

## 2020-02-19 NOTE — PROGRESS NOTES
Infectious Disease Follow up Notes  Admit Date: 2/18/2020  Hospital Day: 2    Antibiotics :   IV Daptomycin   IV Cefepime     CHIEF COMPLAINT:      Rt TKA infection  S/p ID and wound vac placement      Subjective interval History :  51 y.o. man with significant history for Gastric by pass surgery with complicated course intestinal obstruction and surgeries and drainage and also had Left Nephrectomy for a tumor found during surgery in the past and pt has lost significant wt from the surgery and had Rt knee surgeries in the  Past underwent Rt TKA by [facility] on 12/19 and following this was admitted with Rt knee swelling and fluid collection was taken back to OR for ID and liner exchange by  on 1/22 and OP cx negative but one cx positive for Corynebacterium jeikeium from BROTH cx only he was d/c to home on IV Vancomycin and oral Cipro PICc LINE in place and follow up was arranged with me and he was supposed to see me in the clinic tomorrow. But he is  Now admitted from [facility] office for ID and aspiration and wound vac placement due to slow healing and open wound with some on going drainage. He denies any fevers no chills and PICC working ok and tolerating IV abx ok. He had repeat Knee fluid aspiration and cx are in process from OR today 2/18. Rt TKA incision pain, wound vac in place tolerating IV abx ok and pICC line coming out will see if we can get a new one over wire. Fluid cx in process.      Past Medical History:    Past Medical History:   Diagnosis Date    Alcoholism [facility])     last drink 2015    Allergic migraine with status migrainosus     Allergic rhinitis, seasonal     Anemia     Arthritis     right knee    Vaughn's esophagus     Benign intracranial hypertension     Cancer (HCC)     renal     Carpal tunnel syndrome     Chronic pain     Depression     Depression     GERD (gastroesophageal reflux disease)     Headache(784.0)     History of blood transfusion     History of tobacco use     Quit 7/2014    Migraine     chronic for 1 year    Morbid obesity ([address]     Movement disorder     Onychomycosis     Sleep apnea, obstructive     Severe uses cpap stop bang 6    Unspecified cerebral artery occlusion with cerebral infarction 2014    slight weakness in left arm    Wears dentures     full set    Wears glasses        Past Surgical History:    Past Surgical History:   Procedure Laterality Date    ABDOMEN SURGERY      gastric bypass    ABDOMEN SURGERY  4-7-2016    repair of recurrent incisional hernia with mesh, removal of old mesh, bilateral component separation    ABDOMINAL EXPLORATION SURGERY  1/11/16    exp lap, lysis of adhesions, small bowel resection    CARPAL TUNNEL RELEASE      bilat    DILATATION, ESOPHAGUS      ENDOSCOPY, COLON, DIAGNOSTIC      EYE SURGERY      GASTRIC BYPASS SURGERY  Jan 2009    Has lost about 200 pounds.  HERNIA REPAIR  3-    ventral    JOINT REPLACEMENT      KIDNEY REMOVAL Left 08/01/2018    KNEE ARTHROSCOPY Right 7/11/2013    Dr.Robert WaltersAdelina     KNEE ARTHROSCOPY Right 7/11/12    RIGHT KNEE ARTHROSCOPY WITH CHONDROPLASTY    KNEE SURGERY  July 2012    right, arthroscopy    KNEE SURGERY Right 2/18/2020    INCISION AND DRAINAGE RIGHT KNEE AND WOULD VAC PLACEMENT performed by Ranjeet Nayak MD at [facility]  2005    OTHER SURGICAL HISTORY Left 03/08/2016    CT biopsy ablasion left kidney    OTHER SURGICAL HISTORY  2016    small intestine 12 inch removed, 2 hernia surgeries, another surgery to drain infection from incision.      REVISION TOTAL KNEE ARTHROPLASTY Right 12/17/2019    RIGHT REVISION TIBIA TOTAL KNEE REPLACEMENT performed by Ranjeet Nayak MD at [facility] Right 1/22/2020    RIGHT KNEE IRRIGATION AND DEBRIDEMENT WITH POLY EXCHANGE performed by Jasmine Myrick MD at [facility] 02/10/2020    AST 14 02/10/2020    PROT 5.8 02/10/2020    PROT 6.7 01/07/2015    BILITOT 0.4 02/10/2020    BILIDIR <0.2 02/18/2015    IBILI 0.3 02/18/2015    LABALBU 3.2 02/10/2020       UA:  Lab Results   Component Value Date    COLORU YELLOW 12/03/2019    CLARITYU Clear 12/03/2019    GLUCOSEU Negative 12/03/2019    GLUCOSEU NEGATIVE 02/07/2011    BILIRUBINUR Negative 12/03/2019    BILIRUBINUR NEGATIVE 02/07/2011    KETUA Negative 12/03/2019    SPECGRAV <1.005 12/03/2019    BLOODU Negative 12/03/2019    PHUR 6.5 12/03/2019    PROTEINU Negative 12/03/2019    UROBILINOGEN 0.2 12/03/2019    NITRU Negative 12/03/2019    LEUKOCYTESUR SMALL 12/03/2019    LABMICR YES 12/03/2019    URINETYPE NotGiven 12/03/2019      Urine Microscopic:   Lab Results   Component Value Date    HYALCAST 0 12/03/2019    WBCUA 1 12/03/2019    RBCUA 0 12/03/2019    EPIU 0 12/03/2019       MICRO: cultures reviewed and updated by me            Joint Culture [[id number]] Collected: 02/18/20 1436   Order Status: Completed Specimen: Joint/Joint Fluid from Joint, Knee Updated: 02/19/20 1203    Gram Stain Result No Epithelial Cells seen   No organisms seen   2+ WBC's (Polymorphonuclear) present   Dr. Goyo Morales reviewed gram stain     Culture, Joint Aerobic No growth to date    Culture, Joint Anaerobic Further report to follow   Narrative:     ORDER#: [id number]                          ORDERED BY: Matthew Ray  SOURCE: Knee                               COLLECTED:  02/18/20 14:36  ANTIBIOTICS AT [facility]. :                      RECEIVED :  02/18/20 15:32  Performed at:  [facility]  [address] [facility] [address]   Phone [phone]     Component Collected Lab   Gram Stain Result 01/22/2020 11:32 AM [address] Lab   3+ WBC's (Polymorphonuclear)   No Epithelial Cells seen   No organisms seen    Culture, Joint Anaerobic 01/22/2020 11:32 AM [facility] Lab   No anaerobes isolated    Organism Abnormal  01/22/2020 11:32 AM [address] Lab   Corynebacterium vanceikeium    Culture, Joint Aerobic 01/22/2020 11:32 AM  - [facility] Lab   Isolated from Broth   No further workup          IMAGING:    XR CHEST PORTABLE   Final Result   PICC line with tip in the right subclavian vein. All the pertinent images and reports for the current Hospitalization were reviewed by me     Scheduled Meds:   atorvastatin  40 mg Oral Nightly    calcium-vitamin D  1 tablet Oral BID WC    FLUoxetine  20 mg Oral Daily    multivitamin  1 tablet Oral Daily    pantoprazole  40 mg Oral QAM AC    traZODone  200 mg Oral Nightly    alteplase  1 mg Intracatheter Once    sodium chloride flush  10 mL Intravenous 2 times per day    acetaminophen  650 mg Oral Q6H    docusate sodium  100 mg Oral BID    sennosides-docusate sodium  1 tablet Oral BID    vancomycin  1,000 mg Intravenous Q12H    cefepime  2 g Intravenous 2 times per day       Continuous Infusions:   sodium chloride 100 mL/hr at 02/19/20 0500       PRN Meds:  dicyclomine, sodium chloride flush, oxyCODONE **OR** oxyCODONE, magnesium hydroxide, ondansetron, HYDROmorphone      Assessment:     Patient Active Problem List   Diagnosis    Insomnia-chronic intermittent--uses prn ambien--to be filled by dr Jennifer Friedman (sleep dr)   Saira Sinha daily ppi-last egd 5/15--dr Bridget Chacon History of tobacco useadvised to quit- quit 7/1/2014    Allergic rhinitis, seasonal    Obstructive sleep apnea,Severe -(on cpap)    Class 1 obesity due to excess calories with body mass index (BMI) of 34.0 to 34.9 in adult    Depression-on daily effexor xr--sees dr Khris Oakes    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     Chondromalacia of patellofemoral joint    Chronic pain syndrome    History of stroke    Intracranial hypertension    ETOHism (HCC)--1/16--detoxed at [facility]--attending MARICRUZ Cifuentes

## 2020-02-19 NOTE — PROGRESS NOTES
Patient is A&O. Patient is resting in bed, awake and quiet. Room air. Side rails are up x2. Fall precautions are in place. Bed alarm on. Bed is in lowest position. Call light is in reach. VSS taken. AM meds given. Shift assessment completed. Will continue to monitor patient per unit protocols.  Electronically signed by Aron Howell RN on 2/19/2020 at 10:40 AM

## 2020-02-19 NOTE — PROGRESS NOTES
PICC in rt upper are line cannot be used as a PICC as it is out of the SVC, per picc nurse can be used as a midline if needed. Pt has patent IV access in left forearm, PICC nurse advises an exchange can be made using current PICC site if pt needs PICC line for IV ABX at [place].  This nurse has advised all nursing staff not to use the MIDLINE until advised further by MD.   Electronically signed by Etta Almanzar RN on 2/18/2020 at 7:00 PM

## 2020-02-19 NOTE — PROGRESS NOTES
Physical Therapy    Facility/Department: [address] ORTHOPEDICS  Initial Assessment/discharge note    NAME: Dottie Rai  : 1970  MRN: [id number]    Date of Service: 2020    Assessment / Discharge Recommendations:  -anticipate home with family assist PRN  -he is able to ambulate in room and hallway with nursing supervision  -he did not require use of walker at this session    Body structures, Functions, Activity limitations: Decreased functional mobility   Prognosis: Good  Decision Making: Low Complexity  REQUIRES PT FOLLOW UP: No  Activity Tolerance: Patient Tolerated treatment well       Patient Diagnosis(es): The encounter diagnosis was Wound dehiscence. has a past medical history of Alcoholism ([address] Allergic migraine with status migrainosus, Allergic rhinitis, seasonal, Anemia, Arthritis, Vaughn's esophagus, Benign intracranial hypertension, Cancer ([address] Carpal tunnel syndrome, Chronic pain, Depression, Depression, GERD (gastroesophageal reflux disease), Headache(784.0), History of blood transfusion, History of tobacco use, Migraine, Morbid obesity ([address] Movement disorder, Onychomycosis, Sleep apnea, obstructive, Unspecified cerebral artery occlusion with cerebral infarction, Wears dentures, and Wears glasses. has a past surgical history that includes Gastric bypass surgery (2009); Splenectomy; LASIK (); knee surgery (2012); Carpal tunnel release; laparotomy; Knee arthroscopy (Right, 2013); Knee arthroscopy (Right, 12); Total knee arthroplasty (10/15/13); Endoscopy, colon, diagnostic; Dilatation, esophagus; eye surgery; joint replacement; Abdominal exploration surgery (16); other surgical history (Left, 2016); hernia repair (3-); Upper gastrointestinal endoscopy (2016); other surgical history (); Upper gastrointestinal endoscopy (2018); Kidney removal (Left, 2018); Abdomen surgery; Abdomen surgery (2016);  Revision total knee Yes  Ambulation 1  Surface: level tile  Device: No Device(holding the IV pole)  Assistance: Supervision  Quality of Gait: step through pattern with good base of support and even stride length  Distance: in room for several passes including into and return from bathroom  Comments: steady throughout  Stairs/Curb  Stairs?: No  Balance  Comments: steady with all mobility   Exercises  Comments: patient instructed to do the isometric exercises he is well familiar with    Plan   Safety Devices  Type of devices:  All fall risk precautions in place, Call light within reach, Left in bed, Nurse notified(adalid)    Goals  Patient Goals   Patient goals : heal the knee        Therapy Time   Individual Concurrent Group Co-treatment   Time In       1030   Time Out       1055   Minutes       [facility], PT

## 2020-02-19 NOTE — PLAN OF CARE
Problem: Nutrition  Goal: Optimal nutrition therapy  Outcome: Ongoing       Nutrition Problem: Inadequate oral intake  Intervention: Food and/or Nutrient Delivery: Continue current diet, Start ONS  Nutritional Goals: Continue current diet. Start ONS and black BID. Consume >50% of meals, ONS, and black.

## 2020-02-19 NOTE — PROGRESS NOTES
Nutrition Assessment    Type and Reason for Visit: Consult, Positive Nutrition Screen, Initial    Nutrition Recommendations:   Continue current diet. Start ensure and black (patient prefers orange flavor) BID. Consume >50% of meals, ONS, and black. Nutrition Assessment: Patient w/ infection of RTKR w/ swelling and pain. Hx of gastric bypass surgery in 2009, losing about 200lb as a result. Patient stated his appetite has never been big since his bypass surgery, but for the past week it's been bad. Patient has no n/v or constipation. CBW is 253lb. Patient stated his UBW is around 12 - 265lb. Patient agreed to having an ensure and orange flavored black added to his tray to increase calorie and protein intake. Malnutrition Assessment:  · Malnutrition Status: Meets the criteria for moderate malnutrition  · Context: Acute illness or injury  · Findings of the 6 clinical characteristics of malnutrition (Minimum of 2 out of 6 clinical characteristics is required to make the diagnosis of moderate or severe Protein Calorie Malnutrition based on AND/ASPEN Guidelines):  1. Energy Intake-Less than or equal to 50% of estimated energy requirement, Greater than or equal to 5 days    2. Weight Loss-5% loss or greater(5%), (3 weeks, weight of 266lb on 2/1/20 noted)  3. Fat Loss-No significant subcutaneous fat loss,    4. Muscle Loss-No significant muscle mass loss,    5. Fluid Accumulation-No significant fluid accumulation,    6.  Strength-Not measured    Nutrition Risk Level:  Moderate    Nutrient Needs:  · Estimated Daily Total Kcal: 1725 - 2070kcal (15 - 18kcal/kg)   · Estimated Daily Protein (g):  122g (1.5g/kg IBW)   · Estimated Daily Total Fluid (ml/day): 1725 - 2070ml (1ml/kcal)     Nutrition Diagnosis:   · Problem: Inadequate oral intake  · Etiology: related to Insufficient energy/nutrient consumption, Pain     Signs and symptoms:  as evidenced by Patient report of, Weight loss    Objective

## 2020-02-19 NOTE — PROGRESS NOTES
Occupational Therapy   Occupational Therapy Initial Assessment and Discharge Summary   Date: 2020   Patient Name: Temo Landrum  MRN: [id number]     : 1970    Date of Service: 2020    Assessment: Pt is 51 y.o. M who presents s/p I& D of R knee with placement of wound vac. Pt with original R TKA in , Revision in 2019, I&D and polyexchange in 2020. PTA pt lives with wife and kids in home with 3 + 5 TONIA. Pt reports independence in self-care tasks, has assist for homemaking responsibilities, and completes functional mobility with no device. Currently, pt presents with ROM/strength in [employer] for self-care and transfers. Pt completed bed mobility with SPV and sit <> stand transfers with SPV/mod I. Pt completed functional mobility while holding on to IV pole with SPV and assist to manage lines. Pt stood to urinate with SPV - anticipate SPV for ADLs at discharge. Anticipate pt is functioning close to baseline. Anticipate pt safe to d/c home with assist from family - no further OT needs. Discharge Recommendations:  Home with assist PRN       Assessment   Performance deficits / Impairments: Decreased balance;Decreased ADL status; Decreased functional mobility   Assessment: Pt is 51 y.o. M who presents s/p I& D of R knee with placement of wound vac. Pt with original R TKA in , Revision in 2019, I&D and polyexchange in 2020. PTA pt lives with wife and kids in home with 3 + 5 TONIA. Pt reports independence in self-care tasks, has assist for homemaking responsibilities, and completes functional mobility with no device. Currently, pt presents with ROM/strength in [employer] for self-care and transfers. Pt completed bed mobility with SPV and sit <> stand transfers with SPV/mod I. Pt completed functional mobility while holding on to IV pole with SPV and assist to manage lines. Pt stood to urinate with SPV - anticipate SPV for ADLs at discharge. Anticipate pt is functioning close to baseline. Anticipate pt safe to d/c home with assist from family - no further OT needs. Prognosis: Fair;Good  Decision Making: Low Complexity  History: PMH: CVA, alcoholism, depression, chronic pain   Exam: ADLs, transfers, func mob, bed mob  Assistance / Modification: SPV/mod I   OT Education: OT Role;ADL Adaptive Strategies; Plan of Care;Transfer Training;Precautions  REQUIRES OT FOLLOW UP: No  Activity Tolerance  Activity Tolerance: Patient Tolerated treatment well  Safety Devices  Safety Devices in place: Yes(NORBERTO Royal) notified)  Type of devices: Left in bed;Call light within reach;Nurse notified           Patient Diagnosis(es): The encounter diagnosis was Wound dehiscence. has a past medical history of Alcoholism ([address] Allergic migraine with status migrainosus, Allergic rhinitis, seasonal, Anemia, Arthritis, Vaughn's esophagus, Benign intracranial hypertension, Cancer ([address] Carpal tunnel syndrome, Chronic pain, Depression, Depression, GERD (gastroesophageal reflux disease), Headache(784.0), History of blood transfusion, History of tobacco use, Migraine, Morbid obesity ([address] Movement disorder, Onychomycosis, Sleep apnea, obstructive, Unspecified cerebral artery occlusion with cerebral infarction, Wears dentures, and Wears glasses. has a past surgical history that includes Gastric bypass surgery (Jan 2009); Splenectomy; LASIK (2005); knee surgery (July 2012); Carpal tunnel release; laparotomy; Knee arthroscopy (Right, 7/11/2013); Knee arthroscopy (Right, 7/11/12); Total knee arthroplasty (10/15/13); Endoscopy, colon, diagnostic; Dilatation, esophagus; eye surgery; joint replacement; Abdominal exploration surgery (1/11/16); other surgical history (Left, 03/08/2016); hernia repair (3-); Upper gastrointestinal endoscopy (6-7-2016); other surgical history (2016); Upper gastrointestinal endoscopy (04/02/2018); Kidney removal (Left, 08/01/2018); Abdomen surgery; Abdomen surgery (4-7-2016);  Revision total knee arthroplasty (Right, 12/17/2019); Revision total knee arthroplasty (Right, 1/22/2020); and knee surgery (Right, 2/18/2020). Restrictions  Restrictions/Precautions  Restrictions/Precautions: Weight Bearing, Fall Risk    Subjective   General  Chart Reviewed: Yes  Patient assessed for rehabilitation services?: Yes  Additional Pertinent Hx: Per Christine Ramirez APRN- CNP: Pt is \"52 y.o. male who presents with right TKA in 2013 with revision per Dr Braden Burns 12/17/19 and then drainage requiring I and D with poly exchange on 1/22/2020 per DR Kramer. Pt has continued to have right knee drainage and swelling and pain and was seen in ortho office today per Lizbet JOAQUIN. The pt was discussed with DR Braden Burns and plan is for repeat I and D with possible TKA revision today. Pt was directly admitted from the office Pain is described in right knee more with walking than in bed and with the intensity of moderate. Pain is described as aching, pressure. Discomfort is intermittent. Pt is alert and oriented in no distress. \"  Family / Caregiver Present: No  Referring Practitioner: Braden Burns MD   Diagnosis: I & D and wound vac placement on R knee  Subjective  Subjective: Pt met bedside, agreeable for therapy evaluation and OOB activity.    Patient Currently in Pain: Yes(sore)  Vital Signs  Patient Currently in Pain: Yes(sore)  Oxygen Therapy  SpO2: 92 %  Pulse Oximeter Device Mode: Intermittent  O2 Device: None (Room air)     Social/Functional History  Social/Functional History  Lives With: Spouse, Son, Daughter  Type of Home: House  Home Layout: Able to Live on Main level with bedroom/bathroom  Home Access: Stairs to enter with rails  Entrance Stairs - Number of Steps: 3 with no rails and 5 with rails  Bathroom Shower/Tub: Walk-in shower, Shower chair without back  Bathroom Toilet: Standard  Bathroom Equipment: Hand-held shower  Bathroom Accessibility: Walker accessible  Home Equipment: Rolling walker, Cane  ADL Assistance: form.  At this time, no further OT is recommended upon discharge. Recommend patient returns to prior setting with prior services. AM-PAC Inpatient Daily Activity Raw Score: 21 (02/19/20 1055)  AM-PAC Inpatient ADL T-Scale Score : 44.27 (02/19/20 1055)  ADL Inpatient CMS 0-100% Score: 32.79 (02/19/20 1055)  ADL Inpatient CMS G-Code Modifier : CJ (02/19/20 1055)    Goals  Short term goals  Time Frame for Short term goals: Pt discharged from acute OT services. Therapy Time   Individual Concurrent Group Co-treatment   Time In       1030   Time Out       1055   Minutes       25   Timed Code Treatment Minutes: 10 Minutes(15 min eval )     If pt is discharged prior to next OT session, this note will serve as the discharge summary.     VIJI Tucker/JAZMÍN#[id number]

## 2020-02-20 LAB
C-REACTIVE PROTEIN: 20.9 MG/L (ref 0–5.1)
PREALBUMIN: 13.1 MG/DL (ref 20–40)
SEDIMENTATION RATE, ERYTHROCYTE: 6 MM/HR (ref 0–15)

## 2020-02-20 PROCEDURE — 02PY33Z REMOVAL OF INFUSION DEVICE FROM GREAT VESSEL, PERCUTANEOUS APPROACH: ICD-10-PCS | Performed by: ORTHOPAEDIC SURGERY

## 2020-02-20 PROCEDURE — 02HV33Z INSERTION OF INFUSION DEVICE INTO SUPERIOR VENA CAVA, PERCUTANEOUS APPROACH: ICD-10-PCS | Performed by: ORTHOPAEDIC SURGERY

## 2020-02-20 PROCEDURE — 6360000002 HC RX W HCPCS: Performed by: INTERNAL MEDICINE

## 2020-02-20 PROCEDURE — 36415 COLL VENOUS BLD VENIPUNCTURE: CPT

## 2020-02-20 PROCEDURE — 85652 RBC SED RATE AUTOMATED: CPT

## 2020-02-20 PROCEDURE — 2580000003 HC RX 258: Performed by: ORTHOPAEDIC SURGERY

## 2020-02-20 PROCEDURE — 2580000003 HC RX 258: Performed by: INTERNAL MEDICINE

## 2020-02-20 PROCEDURE — 86140 C-REACTIVE PROTEIN: CPT

## 2020-02-20 PROCEDURE — 94760 N-INVAS EAR/PLS OXIMETRY 1: CPT

## 2020-02-20 PROCEDURE — 6360000002 HC RX W HCPCS: Performed by: ORTHOPAEDIC SURGERY

## 2020-02-20 PROCEDURE — 6370000000 HC RX 637 (ALT 250 FOR IP): Performed by: ORTHOPAEDIC SURGERY

## 2020-02-20 PROCEDURE — 1200000000 HC SEMI PRIVATE

## 2020-02-20 PROCEDURE — 84134 ASSAY OF PREALBUMIN: CPT

## 2020-02-20 PROCEDURE — C1751 CATH, INF, PER/CENT/MIDLINE: HCPCS

## 2020-02-20 PROCEDURE — 99232 SBSQ HOSP IP/OBS MODERATE 35: CPT | Performed by: INTERNAL MEDICINE

## 2020-02-20 PROCEDURE — 6370000000 HC RX 637 (ALT 250 FOR IP): Performed by: NURSE PRACTITIONER

## 2020-02-20 PROCEDURE — 94660 CPAP INITIATION&MGMT: CPT

## 2020-02-20 PROCEDURE — 2580000003 HC RX 258: Performed by: NURSE PRACTITIONER

## 2020-02-20 PROCEDURE — 36584 COMPL RPLCMT PICC RS&I: CPT

## 2020-02-20 RX ORDER — SODIUM CHLORIDE 0.9 % (FLUSH) 0.9 %
10 SYRINGE (ML) INJECTION EVERY 12 HOURS SCHEDULED
Status: DISCONTINUED | OUTPATIENT
Start: 2020-02-20 | End: 2020-02-21 | Stop reason: HOSPADM

## 2020-02-20 RX ORDER — SODIUM CHLORIDE 0.9 % (FLUSH) 0.9 %
10 SYRINGE (ML) INJECTION PRN
Status: DISCONTINUED | OUTPATIENT
Start: 2020-02-20 | End: 2020-02-21 | Stop reason: HOSPADM

## 2020-02-20 RX ORDER — LIDOCAINE HYDROCHLORIDE 10 MG/ML
5 INJECTION, SOLUTION EPIDURAL; INFILTRATION; INTRACAUDAL; PERINEURAL ONCE
Status: DISCONTINUED | OUTPATIENT
Start: 2020-02-20 | End: 2020-02-21 | Stop reason: HOSPADM

## 2020-02-20 RX ADMIN — FLUOXETINE 20 MG: 20 CAPSULE ORAL at 08:04

## 2020-02-20 RX ADMIN — ACETAMINOPHEN 650 MG: 325 TABLET ORAL at 23:40

## 2020-02-20 RX ADMIN — ZOLPIDEM TARTRATE 10 MG: 5 TABLET ORAL at 23:37

## 2020-02-20 RX ADMIN — HYDROMORPHONE HYDROCHLORIDE 1 MG: 1 INJECTION, SOLUTION INTRAMUSCULAR; INTRAVENOUS; SUBCUTANEOUS at 17:44

## 2020-02-20 RX ADMIN — CEFEPIME HYDROCHLORIDE 2 G: 2 INJECTION, POWDER, FOR SOLUTION INTRAVENOUS at 05:20

## 2020-02-20 RX ADMIN — DOCUSATE SODIUM 100 MG: 100 CAPSULE, LIQUID FILLED ORAL at 08:04

## 2020-02-20 RX ADMIN — OXYCODONE 10 MG: 5 TABLET ORAL at 04:33

## 2020-02-20 RX ADMIN — THERA TABS 1 TABLET: TAB at 08:05

## 2020-02-20 RX ADMIN — OXYCODONE 10 MG: 5 TABLET ORAL at 15:09

## 2020-02-20 RX ADMIN — DAPTOMYCIN 500 MG: 500 INJECTION, POWDER, LYOPHILIZED, FOR SOLUTION INTRAVENOUS at 18:32

## 2020-02-20 RX ADMIN — SODIUM CHLORIDE, PRESERVATIVE FREE 10 ML: 5 INJECTION INTRAVENOUS at 08:05

## 2020-02-20 RX ADMIN — ACETAMINOPHEN 650 MG: 325 TABLET ORAL at 11:53

## 2020-02-20 RX ADMIN — SENNOSIDES AND DOCUSATE SODIUM 1 TABLET: 8.6; 5 TABLET ORAL at 08:05

## 2020-02-20 RX ADMIN — OXYCODONE 10 MG: 5 TABLET ORAL at 19:57

## 2020-02-20 RX ADMIN — HYDROMORPHONE HYDROCHLORIDE 1 MG: 1 INJECTION, SOLUTION INTRAMUSCULAR; INTRAVENOUS; SUBCUTANEOUS at 02:27

## 2020-02-20 RX ADMIN — TRAZODONE HYDROCHLORIDE 200 MG: 100 TABLET ORAL at 23:36

## 2020-02-20 RX ADMIN — ACETAMINOPHEN 650 MG: 325 TABLET ORAL at 17:43

## 2020-02-20 RX ADMIN — OYSTER SHELL CALCIUM WITH VITAMIN D 1 TABLET: 500; 200 TABLET, FILM COATED ORAL at 08:05

## 2020-02-20 RX ADMIN — HYDROMORPHONE HYDROCHLORIDE 1 MG: 1 INJECTION, SOLUTION INTRAMUSCULAR; INTRAVENOUS; SUBCUTANEOUS at 08:05

## 2020-02-20 RX ADMIN — HYDROMORPHONE HYDROCHLORIDE 1 MG: 1 INJECTION, SOLUTION INTRAMUSCULAR; INTRAVENOUS; SUBCUTANEOUS at 22:08

## 2020-02-20 RX ADMIN — ATORVASTATIN CALCIUM 40 MG: 40 TABLET, FILM COATED ORAL at 19:57

## 2020-02-20 RX ADMIN — SENNOSIDES AND DOCUSATE SODIUM 1 TABLET: 8.6; 5 TABLET ORAL at 19:57

## 2020-02-20 RX ADMIN — SODIUM CHLORIDE, PRESERVATIVE FREE 10 ML: 5 INJECTION INTRAVENOUS at 13:17

## 2020-02-20 RX ADMIN — SODIUM CHLORIDE: 4.5 INJECTION, SOLUTION INTRAVENOUS at 23:41

## 2020-02-20 RX ADMIN — HYDROMORPHONE HYDROCHLORIDE 1 MG: 1 INJECTION, SOLUTION INTRAMUSCULAR; INTRAVENOUS; SUBCUTANEOUS at 13:20

## 2020-02-20 RX ADMIN — OXYCODONE 10 MG: 5 TABLET ORAL at 10:46

## 2020-02-20 RX ADMIN — PANTOPRAZOLE SODIUM 40 MG: 40 TABLET, DELAYED RELEASE ORAL at 05:20

## 2020-02-20 RX ADMIN — DOCUSATE SODIUM 100 MG: 100 CAPSULE, LIQUID FILLED ORAL at 19:57

## 2020-02-20 RX ADMIN — OYSTER SHELL CALCIUM WITH VITAMIN D 1 TABLET: 500; 200 TABLET, FILM COATED ORAL at 17:43

## 2020-02-20 RX ADMIN — ACETAMINOPHEN 650 MG: 325 TABLET ORAL at 05:20

## 2020-02-20 RX ADMIN — CEFEPIME HYDROCHLORIDE 2 G: 2 INJECTION, POWDER, FOR SOLUTION INTRAVENOUS at 17:44

## 2020-02-20 ASSESSMENT — PAIN DESCRIPTION - PROGRESSION

## 2020-02-20 ASSESSMENT — PAIN SCALES - GENERAL
PAINLEVEL_OUTOF10: 7
PAINLEVEL_OUTOF10: 8
PAINLEVEL_OUTOF10: 3
PAINLEVEL_OUTOF10: 8
PAINLEVEL_OUTOF10: 9
PAINLEVEL_OUTOF10: 6
PAINLEVEL_OUTOF10: 8
PAINLEVEL_OUTOF10: 5
PAINLEVEL_OUTOF10: 5
PAINLEVEL_OUTOF10: 7
PAINLEVEL_OUTOF10: 9
PAINLEVEL_OUTOF10: 7
PAINLEVEL_OUTOF10: 5
PAINLEVEL_OUTOF10: 5
PAINLEVEL_OUTOF10: 7
PAINLEVEL_OUTOF10: 5
PAINLEVEL_OUTOF10: 6
PAINLEVEL_OUTOF10: 6
PAINLEVEL_OUTOF10: 9
PAINLEVEL_OUTOF10: 8
PAINLEVEL_OUTOF10: 8
PAINLEVEL_OUTOF10: 0

## 2020-02-20 ASSESSMENT — PAIN SCALES - WONG BAKER

## 2020-02-20 ASSESSMENT — PAIN DESCRIPTION - FREQUENCY
FREQUENCY: CONTINUOUS

## 2020-02-20 ASSESSMENT — PAIN - FUNCTIONAL ASSESSMENT
PAIN_FUNCTIONAL_ASSESSMENT: PREVENTS OR INTERFERES SOME ACTIVE ACTIVITIES AND ADLS

## 2020-02-20 ASSESSMENT — PAIN DESCRIPTION - PAIN TYPE
TYPE: SURGICAL PAIN

## 2020-02-20 ASSESSMENT — PAIN DESCRIPTION - LOCATION
LOCATION: KNEE

## 2020-02-20 ASSESSMENT — PAIN DESCRIPTION - ONSET
ONSET: ON-GOING

## 2020-02-20 ASSESSMENT — PAIN DESCRIPTION - DESCRIPTORS
DESCRIPTORS: ACHING
DESCRIPTORS: ACHING;DISCOMFORT
DESCRIPTORS: ACHING
DESCRIPTORS: ACHING;DISCOMFORT
DESCRIPTORS: ACHING
DESCRIPTORS: ACHING;DISCOMFORT
DESCRIPTORS: ACHING
DESCRIPTORS: ACHING;DISCOMFORT
DESCRIPTORS: ACHING
DESCRIPTORS: ACHING;DISCOMFORT

## 2020-02-20 ASSESSMENT — PAIN DESCRIPTION - ORIENTATION
ORIENTATION: RIGHT

## 2020-02-20 NOTE — PROGRESS NOTES
Infectious Disease Follow up Notes  Admit Date: 2/18/2020  Hospital Day: 3    Antibiotics :   IV Daptomycin   IV Cefepime     CHIEF COMPLAINT:      Rt TKA infection  S/p ID and wound vac placement   H/o splenectomy   H/o gastric by pass surgery in the past     Subjective interval History :  51 y.o. man with significant history for Gastric by pass surgery with complicated course intestinal obstruction and surgeries and drainage and also had Left Nephrectomy for a tumor found during surgery in the past and pt has lost significant wt from the surgery and had Rt knee surgeries in the  Past underwent Rt TKA by Pauly Hu on 12/19 and following this was admitted with Rt knee swelling and fluid collection was taken back to OR for ID and liner exchange by  on 1/22 and OP cx negative but one cx positive for Corynebacterium jeikeium from BROTH cx only he was d/c to home on IV Vancomycin and oral Cipro PICc LINE in place and follow up was arranged with me and he was supposed to see me in the clinic tomorrow. But he is  Now admitted from Pauly Hu office for ID and aspiration and wound vac placement due to slow healing and open wound with some on going drainage. He denies any fevers no chills and PICC working ok and tolerating IV abx ok. He had repeat Knee fluid aspiration and cx are in process from OR today 2/18.      Rt TKA incision pain, better and wound vac in place PICC replaced and tolerating IV abx ok OR cX NGTD     Past Medical History:    Past Medical History:   Diagnosis Date    Alcoholism ([address]     last drink 2015    Allergic migraine with status migrainosus     Allergic rhinitis, seasonal     Anemia     Arthritis     right knee    Vaughn's esophagus     Benign intracranial hypertension     Cancer (HCC)     renal     Carpal tunnel syndrome     Chronic pain     Depression     Depression     GERD (gastroesophageal reflux disease)     Headache(784.0)     History of blood transfusion     History of tobacco use     Quit 7/2014    Migraine     chronic for 1 year    Morbid obesity ([address]     Movement disorder     Onychomycosis     Sleep apnea, obstructive     Severe uses cpap stop bang 6    Unspecified cerebral artery occlusion with cerebral infarction 2014    slight weakness in left arm    Wears dentures     full set    Wears glasses        Past Surgical History:    Past Surgical History:   Procedure Laterality Date    ABDOMEN SURGERY      gastric bypass    ABDOMEN SURGERY  4-7-2016    repair of recurrent incisional hernia with mesh, removal of old mesh, bilateral component separation    ABDOMINAL EXPLORATION SURGERY  1/11/16    exp lap, lysis of adhesions, small bowel resection    CARPAL TUNNEL RELEASE      bilat    DILATATION, ESOPHAGUS      ENDOSCOPY, COLON, DIAGNOSTIC      EYE SURGERY      GASTRIC BYPASS SURGERY  Jan 2009    Has lost about 200 pounds.  HERNIA REPAIR  3-    ventral    JOINT REPLACEMENT      KIDNEY REMOVAL Left 08/01/2018    KNEE ARTHROSCOPY Right 7/11/2013    Dr.Robert Sanders     KNEE ARTHROSCOPY Right 7/11/12    RIGHT KNEE ARTHROSCOPY WITH CHONDROPLASTY    KNEE SURGERY  July 2012    right, arthroscopy    KNEE SURGERY Right 2/18/2020    INCISION AND DRAINAGE RIGHT KNEE AND WOULD VAC PLACEMENT performed by Columba Wells MD at [facility]  2005    OTHER SURGICAL HISTORY Left 03/08/2016    CT biopsy ablasion left kidney    OTHER SURGICAL HISTORY  2016    small intestine 12 inch removed, 2 hernia surgeries, another surgery to drain infection from incision.      REVISION TOTAL KNEE ARTHROPLASTY Right 12/17/2019    RIGHT REVISION TIBIA TOTAL KNEE REPLACEMENT performed by Columba Wells MD at [facility] Right 1/22/2020    RIGHT KNEE IRRIGATION AND DEBRIDEMENT WITH POLY EXCHANGE performed by Eve Bernstein MD at [address] [facility]  10/15/13    RIGHT    UPPER GASTROINTESTINAL ENDOSCOPY  6-7-2016    UPPER GASTROINTESTINAL ENDOSCOPY  04/02/2018       Current Medications:    Outpatient Medications Marked as Taking for the 2/18/20 encounter [facility] HOSPITAL Encounter)   Medication Sig Dispense Refill    oxyCODONE (ROXICODONE) 5 MG immediate release tablet Take 1 tablet by mouth every 4 hours as needed for Pain for up to 7 days. 30 tablet 0    aspirin EC 81 MG EC tablet Take 1 tablet by mouth 2 times daily for 14 days Please avoid missing doses. 28 tablet 0    zolpidem (AMBIEN) 10 MG tablet Take 10 mg by mouth nightly as needed. 5    dicyclomine (BENTYL) 20 MG tablet Take 40 mg by mouth 2 times daily as needed  0    atorvastatin (LIPITOR) 40 MG tablet TAKE 1 TABLET BY MOUTH EVERY NIGHT AT BEDTIME 90 tablet 1    traZODone (DESYREL) 100 MG tablet Take 2 tablets by mouth nightly 180 tablet 0    FLUoxetine (PROZAC) 20 MG capsule TAKE 1 CAPSULE BY MOUTH DAILY 90 capsule 1    sildenafil (REVATIO) 20 MG tablet Take 4 tablets by mouth as needed (30 min prior to intercourse) 30 tablet 5    pantoprazole (PROTONIX) 40 MG tablet Take 1 tablet by mouth daily (Patient taking differently: Take 40 mg by mouth 2 times daily ) 30 tablet 3    calcium-vitamin D (OSCAL 500/200 D-3) 500-200 MG-UNIT per tablet Take 1 tablet by mouth 2 times daily       Multiple Vitamin TABS Take 1 tablet by mouth daily          Allergies:  Nsaids; Prochlorperazine; Valtrex [valacyclovir hcl];  Morphine; Morphine and related; and Tolmetin    Immunizations :   Immunization History   Administered Date(s) Administered    HIB PRP-T (ActHIB, Hiberix) 09/13/2010    Hib, unspecified 02/09/2015    Influenza Virus Vaccine 02/03/2018    Influenza Whole 10/04/2012    Influenza, Intradermal, Preservative free 10/28/2014    Influenza, Intradermal, Quadrivalent, Preservative Free 12/13/2016    Influenza, Quadv, IM, PF (6 mo and older Fluzone, Flulaval, Fluarix, and 3 yrs and older Afluria) 2019    Influenza, Latrelle Cones, Recombinant, IM PF (Flublok 18 yrs and older) 10/15/2018    Meningococcal ACWY Vaccine 01/15/2009    Meningococcal MCV4O (Menveo) 08/10/2019, 10/30/2019    Meningococcal MCV4P (Menactra) 2015, 2019, 10/30/2019    Meningococcal MPSV4 (Menomune) 01/15/2009    Pneumococcal Conjugate 13-valent ([id number]) 2015    Pneumococcal Polysaccharide ([id number]) 01/15/2009, 10/09/2013, 2019    Td, unspecified formulation 2007    Tdap (Boostrix, Adacel) 2007, 2014       Social History:     Social History     Tobacco Use    Smoking status: Former Smoker     Packs/day: 0.50     Years: 25.00     Pack years: 12.50     Types: Cigarettes     Last attempt to quit: 2017     Years since quittin.5    Smokeless tobacco: Never Used   Substance Use Topics    Alcohol use: No     Alcohol/week: 0.0 standard drinks     Comment: last drink     Drug use: No     Social History     Tobacco Use   Smoking Status Former Smoker    Packs/day: 0.50    Years: 25.00    Pack years: 12.50    Types: Cigarettes    Last attempt to quit: 2017    Years since quittin.5   Smokeless Tobacco Never Used      Family History   Problem Relation Age of Onset    Cancer Father         Lymphoma    Arthritis Mother     Dementia Other     Diabetes Other     Cancer Other     Diabetes Maternal Uncle     Heart Disease Maternal Uncle     Depression Other     Heart Disease Maternal Uncle          REVIEW OF SYSTEMS:    No fever / chills / sweats. No weight loss. No visual change, eye pain, eye discharge. No oral lesion, sore throat, dysphagia. Denies cough / sputum/Sob   Denies chest pain, palpitations/ dizziness  Denies nausea/ vomiting/abdominal pain/diarrhea. Denies dysuria or change in urinary function. Denies joint swelling or pain. No myalgia, arthralgia.   No rashes, skin lesions   Denies focal weakness, sensory change or other neurologic symptoms  No lymph node swelling or tenderness.       Rt TKA incision local redness, open wound drainage      PHYSICAL EXAM:      Vitals:    /72   Pulse 82   Temp 97.9 °F (36.6 °C) (Oral)   Resp 17   Ht 6' (1.829 m)   Wt 253 lb 1.4 oz (114.8 kg)   SpO2 92%   BMI 34.32 kg/m²        General Appearance: alert,in some acute distress, no pallor, no icterus  Obesity + edentulous+ Spider Naevi++  Skin: warm and dry, no rash or erythema  Head: normocephalic and atraumatic  Eyes: pupils equal, round, and reactive to light, conjunctivae normal  ENT: tympanic membrane, external ear and ear canal normal bilaterally, nose without deformity, nasal mucosa and turbinates normal without polyps  Neck: supple and non-tender without mass, no thyromegaly  no cervical lymphadenopathy  Pulmonary/Chest: clear to auscultation bilaterally- no wheezes, rales or rhonchi, normal air movement, no respiratory distress  Cardiovascular: normal rate, regular rhythm, normal S1 and S2, no murmurs, rubs, clicks, or gallops, no carotid bruits  Abdomen: soft, non-tender, non-distended, normal bowel sounds, no masses or organomegaly  Large pannus scar from previous surgery   Left Nephrectomy scar+   Extremities: no cyanosis, clubbing or edema  Musculoskeletal: normal range of motion, no joint swelling, deformity or tenderness  Neurologic: reflexes normal and symmetric, no cranial nerve deficit  Psych:  Orientation, sensorium, mood normal  Lines:  PICC         Data Review:    Lab Results   Component Value Date    WBC 6.0 02/18/2020    HGB 12.1 (L) 02/19/2020    HCT 38.1 (L) 02/19/2020    MCV 90.2 02/18/2020     02/18/2020     Lab Results   Component Value Date    CREATININE 0.9 02/18/2020    BUN 4 (L) 02/18/2020     02/18/2020    K 3.9 02/18/2020     02/18/2020    CO2 24 02/18/2020       Hepatic Function Panel:   Lab Results   Component Value Date    ALKPHOS 108 02/10/2020 ALT 9 02/10/2020    AST 14 02/10/2020    PROT 5.8 02/10/2020    PROT 6.7 01/07/2015    BILITOT 0.4 02/10/2020    BILIDIR <0.2 02/18/2015    IBILI 0.3 02/18/2015    LABALBU 3.2 02/10/2020       UA:  Lab Results   Component Value Date    COLORU YELLOW 12/03/2019    CLARITYU Clear 12/03/2019    GLUCOSEU Negative 12/03/2019    GLUCOSEU NEGATIVE 02/07/2011    BILIRUBINUR Negative 12/03/2019    BILIRUBINUR NEGATIVE 02/07/2011    KETUA Negative 12/03/2019    SPECGRAV <1.005 12/03/2019    BLOODU Negative 12/03/2019    PHUR 6.5 12/03/2019    PROTEINU Negative 12/03/2019    UROBILINOGEN 0.2 12/03/2019    NITRU Negative 12/03/2019    LEUKOCYTESUR SMALL 12/03/2019    LABMICR YES 12/03/2019    URINETYPE NotGiven 12/03/2019      Urine Microscopic:   Lab Results   Component Value Date    HYALCAST 0 12/03/2019    WBCUA 1 12/03/2019    RBCUA 0 12/03/2019    EPIU 0 12/03/2019       MICRO: cultures reviewed and updated by me            Joint Culture [[id number]] Collected: 02/18/20 1436   Order Status: Completed Specimen: Joint/Joint Fluid from Joint, Knee Updated: 02/19/20 1203    Gram Stain Result No Epithelial Cells seen   No organisms seen   2+ WBC's (Polymorphonuclear) present   Dr. Renee Rojas reviewed gram stain     Culture, Joint Aerobic No growth to date    Culture, Joint Anaerobic Further report to follow   Narrative:     ORDER#: [id number]                          ORDERED BY: Joselyn Hernandes  SOURCE: Knee                               COLLECTED:  02/18/20 14:36  ANTIBIOTICS AT [facility]. :                      RECEIVED :  02/18/20 15:32  Performed at:  [facility]  [address] [place] [address]   Phone [phone]     Component Collected Lab   Gram Stain Result 01/22/2020 11:32 AM [address] Lab   3+ WBC's (Polymorphonuclear)   No Epithelial Cells seen   No organisms seen    Culture, Joint Anaerobic 01/22/2020 11:32 AM [facility]   No anaerobes isolated    Organism Abnormal  01/22/2020 11:32 AM [address] Lab   Corynebacterium breium    Culture, Joint Aerobic 01/22/2020 11:32 AM  - [facility] Lab   Isolated from Broth   No further workup          IMAGING:    XR CHEST PORTABLE   Final Result   PICC line with tip in the right subclavian vein. All the pertinent images and reports for the current Hospitalization were reviewed by me     Scheduled Meds:   daptomycin (CUBICIN) IVPB  500 mg Intravenous Q24H    atorvastatin  40 mg Oral Nightly    calcium-vitamin D  1 tablet Oral BID WC    FLUoxetine  20 mg Oral Daily    multivitamin  1 tablet Oral Daily    pantoprazole  40 mg Oral QAM AC    traZODone  200 mg Oral Nightly    alteplase  1 mg Intracatheter Once    sodium chloride flush  10 mL Intravenous 2 times per day    acetaminophen  650 mg Oral Q6H    docusate sodium  100 mg Oral BID    sennosides-docusate sodium  1 tablet Oral BID    cefepime  2 g Intravenous 2 times per day       Continuous Infusions:   sodium chloride 100 mL/hr at 02/19/20 0500       PRN Meds:  zolpidem, dicyclomine, sodium chloride flush, oxyCODONE **OR** oxyCODONE, magnesium hydroxide, ondansetron, HYDROmorphone      Assessment:     Patient Active Problem List   Diagnosis    Insomnia-chronic intermittent--uses prn ambien--to be filled by dr Santi Howell (sleep dr)   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     Chondromalacia of patellofemoral joint    Chronic pain syndrome    History of stroke    Intracranial hypertension    ETOHism (HCC)--1/16--detoxed at [facility]--attending from prior Gastric by pass surgery    7. JEIMY done for d/c planning    Discussed with patient/Family and Nursing  d/w      Discussed with patient/Family and Nursing staff     Thanks for allowing me to participate in your patient's care and please call me with any questions or concerns.     Frannie Porter MD  Infectious Disease  [facility] Physician  Phone: [phone]   Fax : [phone]

## 2020-02-20 NOTE — PROGRESS NOTES
Patient is resting in bed, awake and quiet. Room air. Side rails are up x3. Fall precautions are in place. Bed alarm on. Bed is in lowest position. Call light is in reach. Patient denies needs at present. Will continue to monitor patient per unit protocols.  Electronically signed by Sandi Yang RN on 2/20/2020 at 4:44 PM

## 2020-02-20 NOTE — CARE COORDINATION
Per [employer] liaison note of 2/20/20, patient's coverage for IV antibiotics:    Therapy: dapto 1gm qd, cefepime 2gm q12     Pt.  Copay: $0/day for all     Electronically signed by Lara Morin on 2/20/2020 at 3:17 PM

## 2020-02-20 NOTE — PROGRESS NOTES
This RN called PICC nurse line and spoke with staff representative about re-advancing patient's PICC line. Will continue to monitor per unit protocols.  Electronically signed by Adri Mcmahon RN on 2/20/2020 at 10:55 AM

## 2020-02-20 NOTE — PROGRESS NOTES
Successful PICC replacement using over the wire exchange. PICC tip in SVC confirmed with Polyplex 3cg tip confirmation system. Pt tolerated procedure well with no complaints.  Bed lowered, side rails raised, call light in place    Pt agreed with POC

## 2020-02-21 VITALS
WEIGHT: 257.5 LBS | OXYGEN SATURATION: 91 % | HEIGHT: 72 IN | BODY MASS INDEX: 34.88 KG/M2 | TEMPERATURE: 98 F | DIASTOLIC BLOOD PRESSURE: 58 MMHG | SYSTOLIC BLOOD PRESSURE: 115 MMHG | RESPIRATION RATE: 12 BRPM | HEART RATE: 70 BPM

## 2020-02-21 LAB — TOTAL CK: 52 U/L (ref 39–308)

## 2020-02-21 PROCEDURE — 6360000002 HC RX W HCPCS: Performed by: INTERNAL MEDICINE

## 2020-02-21 PROCEDURE — 2580000003 HC RX 258: Performed by: INTERNAL MEDICINE

## 2020-02-21 PROCEDURE — 94760 N-INVAS EAR/PLS OXIMETRY 1: CPT

## 2020-02-21 PROCEDURE — 6370000000 HC RX 637 (ALT 250 FOR IP): Performed by: NURSE PRACTITIONER

## 2020-02-21 PROCEDURE — 36415 COLL VENOUS BLD VENIPUNCTURE: CPT

## 2020-02-21 PROCEDURE — 6370000000 HC RX 637 (ALT 250 FOR IP): Performed by: ORTHOPAEDIC SURGERY

## 2020-02-21 PROCEDURE — 6360000002 HC RX W HCPCS: Performed by: ORTHOPAEDIC SURGERY

## 2020-02-21 PROCEDURE — 2580000003 HC RX 258: Performed by: ORTHOPAEDIC SURGERY

## 2020-02-21 PROCEDURE — 82550 ASSAY OF CK (CPK): CPT

## 2020-02-21 RX ADMIN — ACETAMINOPHEN 650 MG: 325 TABLET ORAL at 06:54

## 2020-02-21 RX ADMIN — OXYCODONE 10 MG: 5 TABLET ORAL at 02:39

## 2020-02-21 RX ADMIN — HYDROMORPHONE HYDROCHLORIDE 1 MG: 1 INJECTION, SOLUTION INTRAMUSCULAR; INTRAVENOUS; SUBCUTANEOUS at 04:23

## 2020-02-21 RX ADMIN — HYDROMORPHONE HYDROCHLORIDE 1 MG: 1 INJECTION, SOLUTION INTRAMUSCULAR; INTRAVENOUS; SUBCUTANEOUS at 09:32

## 2020-02-21 RX ADMIN — SODIUM CHLORIDE: 4.5 INJECTION, SOLUTION INTRAVENOUS at 10:49

## 2020-02-21 RX ADMIN — FLUOXETINE 20 MG: 20 CAPSULE ORAL at 09:31

## 2020-02-21 RX ADMIN — CEFEPIME HYDROCHLORIDE 2 G: 2 INJECTION, POWDER, FOR SOLUTION INTRAVENOUS at 06:54

## 2020-02-21 RX ADMIN — OXYCODONE 10 MG: 5 TABLET ORAL at 06:54

## 2020-02-21 RX ADMIN — OXYCODONE 10 MG: 5 TABLET ORAL at 11:59

## 2020-02-21 RX ADMIN — DAPTOMYCIN 500 MG: 500 INJECTION, POWDER, LYOPHILIZED, FOR SOLUTION INTRAVENOUS at 12:51

## 2020-02-21 RX ADMIN — THERA TABS 1 TABLET: TAB at 09:31

## 2020-02-21 RX ADMIN — CEFEPIME HYDROCHLORIDE 2 G: 2 INJECTION, POWDER, FOR SOLUTION INTRAVENOUS at 11:59

## 2020-02-21 RX ADMIN — DOCUSATE SODIUM 100 MG: 100 CAPSULE, LIQUID FILLED ORAL at 09:31

## 2020-02-21 RX ADMIN — ACETAMINOPHEN 650 MG: 325 TABLET ORAL at 11:59

## 2020-02-21 RX ADMIN — PANTOPRAZOLE SODIUM 40 MG: 40 TABLET, DELAYED RELEASE ORAL at 06:54

## 2020-02-21 RX ADMIN — SENNOSIDES AND DOCUSATE SODIUM 1 TABLET: 8.6; 5 TABLET ORAL at 09:31

## 2020-02-21 RX ADMIN — OYSTER SHELL CALCIUM WITH VITAMIN D 1 TABLET: 500; 200 TABLET, FILM COATED ORAL at 09:31

## 2020-02-21 ASSESSMENT — PAIN DESCRIPTION - ONSET
ONSET: ON-GOING

## 2020-02-21 ASSESSMENT — PAIN SCALES - WONG BAKER
WONGBAKER_NUMERICALRESPONSE: 0
WONGBAKER_NUMERICALRESPONSE: 0

## 2020-02-21 ASSESSMENT — PAIN DESCRIPTION - LOCATION
LOCATION: KNEE

## 2020-02-21 ASSESSMENT — PAIN DESCRIPTION - FREQUENCY
FREQUENCY: CONTINUOUS

## 2020-02-21 ASSESSMENT — PAIN DESCRIPTION - PROGRESSION
CLINICAL_PROGRESSION: NOT CHANGED
CLINICAL_PROGRESSION: GRADUALLY WORSENING
CLINICAL_PROGRESSION: NOT CHANGED
CLINICAL_PROGRESSION: GRADUALLY WORSENING
CLINICAL_PROGRESSION: NOT CHANGED

## 2020-02-21 ASSESSMENT — PAIN DESCRIPTION - ORIENTATION
ORIENTATION: RIGHT

## 2020-02-21 ASSESSMENT — PAIN - FUNCTIONAL ASSESSMENT
PAIN_FUNCTIONAL_ASSESSMENT: PREVENTS OR INTERFERES SOME ACTIVE ACTIVITIES AND ADLS

## 2020-02-21 ASSESSMENT — PAIN DESCRIPTION - DESCRIPTORS
DESCRIPTORS: ACHING

## 2020-02-21 ASSESSMENT — PAIN DESCRIPTION - PAIN TYPE
TYPE: SURGICAL PAIN

## 2020-02-21 ASSESSMENT — PAIN SCALES - GENERAL
PAINLEVEL_OUTOF10: 8
PAINLEVEL_OUTOF10: 7
PAINLEVEL_OUTOF10: 6
PAINLEVEL_OUTOF10: 8
PAINLEVEL_OUTOF10: 8
PAINLEVEL_OUTOF10: 7

## 2020-02-21 ASSESSMENT — PAIN DESCRIPTION - DIRECTION
RADIATING_TOWARDS: RIGHT
RADIATING_TOWARDS: RIGHT

## 2020-02-21 NOTE — CARE COORDINATION
Critical access hospital  Received referral from case management [id number] [facility]. with patent regarding [facility] services, Faxed orders to [address] care to see patient by   2/22/2020 for vac placement and IV ATB.    Smitha Mendez LPN    [facility] Cell [phone], Fax [phone]

## 2020-02-21 NOTE — PROGRESS NOTES
Bedside introduction complete with day shift RN and patient. Patient denies any needs at this time. Update the whiteboard with RN and PCA name and number. Patient able to make needs known, using call light appropriately.  Will continue to monitor and assess for needs and comfort. '

## 2020-02-21 NOTE — CARE COORDINATION
Sw spoke with RN about patient having his last dose at hospital prior to dc home as start of home care will be tomorrow, 2/22/20.     Electronically signed by Stephon Mraquis on 2/21/2020 at 11:55 AM

## 2020-02-21 NOTE — PROGRESS NOTES
[facility] Orthopedic Surgery   Progress Note      S/P :  SUBJECTIVE  In bed. Alert and oriented. Wants to go home. . Pain is   described in rightk nee and with the intensity of moderate. Pain is described as aching. OBJECTIVE              Physical                      VITALS:  BP (!) 115/58   Pulse 70   Temp 98 °F (36.7 °C) (Oral)   Resp 12   Ht 6' (1.829 m)   Wt 257 lb 8 oz (116.8 kg)   SpO2 91%   BMI 34.92 kg/m²                     MUSCULOSKELETAL:  right foot NVI. Wiggles toes to command. Pedal pulses are palpable. NEUROLOGIC:                                  Sensory:  Touch:  Right Lower Extremity:  normal                                                 Surgical wound appears clean and intact right knee wound VAC. Small to moderate serosang in drainage chamber. Edges of wound are pink.  MIAH hose applied bilaterally today    Data       CBC:   Lab Results   Component Value Date    WBC 6.0 02/18/2020    RBC 4.01 02/18/2020    RBC 4.94 01/07/2015    HGB 12.1 02/19/2020    HCT 38.1 02/19/2020    MCV 90.2 02/18/2020    MCH 29.8 02/18/2020    MCHC 33.0 02/18/2020    RDW 13.7 02/18/2020     02/18/2020    MPV 8.9 02/18/2020        WBC:    Lab Results   Component Value Date    WBC 6.0 02/18/2020        Hemoglobin/Hematocrit:    Lab Results   Component Value Date    HGB 12.1 02/19/2020    HCT 38.1 02/19/2020        PT/INR:    Lab Results   Component Value Date    PROTIME 14.4 02/18/2020    INR 1.24 02/18/2020              Current Inpatient Medications             Current Facility-Administered Medications: lidocaine PF 1 % injection 5 mL, 5 mL, Intradermal, Once  sodium chloride flush 0.9 % injection 10 mL, 10 mL, Intravenous, 2 times per day  sodium chloride flush 0.9 % injection 10 mL, 10 mL, Intravenous, PRN  DAPTOmycin (CUBICIN) 500 mg in sodium chloride 0.9 % 50 mL IVPB, 500 mg, Intravenous, Q24H  zolpidem (AMBIEN) tablet 10 mg, 10 mg, Oral, Nightly PRN  atorvastatin (LIPITOR) tablet 40 mg, 40 mg, Oral, Nightly  calcium-vitamin D 500-200 MG-UNIT per tablet 1 tablet, 1 tablet, Oral, BID WC  dicyclomine (BENTYL) tablet 40 mg, 40 mg, Oral, BID PRN  FLUoxetine (PROZAC) capsule 20 mg, 20 mg, Oral, Daily  multivitamin 1 tablet, 1 tablet, Oral, Daily  pantoprazole (PROTONIX) tablet 40 mg, 40 mg, Oral, QAM AC  traZODone (DESYREL) tablet 200 mg, 200 mg, Oral, Nightly  alteplase (CATHFLO) injection 1 mg, 1 mg, Intracatheter, Once  0.45 % sodium chloride infusion, , Intravenous, Continuous  acetaminophen (TYLENOL) tablet 650 mg, 650 mg, Oral, Q6H  oxyCODONE (ROXICODONE) immediate release tablet 5 mg, 5 mg, Oral, Q4H PRN **OR** oxyCODONE (ROXICODONE) immediate release tablet 10 mg, 10 mg, Oral, Q4H PRN  docusate sodium (COLACE) capsule 100 mg, 100 mg, Oral, BID  sennosides-docusate sodium (SENOKOT-S) 8.6-50 MG tablet 1 tablet, 1 tablet, Oral, BID  magnesium hydroxide (MILK OF MAGNESIA) 400 MG/5ML suspension 30 mL, 30 mL, Oral, Daily PRN  ondansetron (ZOFRAN) injection 4 mg, 4 mg, Intravenous, Q6H PRN  HYDROmorphone (DILAUDID) injection 1 mg, 1 mg, Intravenous, Q4H PRN  cefepime (MAXIPIME) 2 g IVPB minibag, 2 g, Intravenous, 2 times per day    ASSESSMENT AND PLAN    Right knee pain  Right TKA 2013  Right knee revision, polyexchange Dec 2019 per Dr Xuan Davalos  Right knee I andD for drainage per DR Kramer Jan 2020  Recurrent right knee wound dehiscence, seen in ortho office  and performed I and D with wound VAC per Dr Xuan Davalos, stable exam  Right leg swelling, MIAH hose bilaterally on today  WBAT right leg  PT OT to see  SS for DC planning. Home with home care today for PICC line antibx and wound vac. ID following   Low prealbumin, has hx bowel bypass, Moderate protein calorie malnutrition.  Pt started on Protein drinks yesterday per dietician      Jason Torres [facility]  2/21/2020  10:06 AM

## 2020-02-21 NOTE — PLAN OF CARE
Infection:  Goal: Will show no infection signs and symptoms  Description  Will show no infection signs and symptoms  Outcome: Ongoing  Note:   Patient will show no infection signs or symptoms at PICC insertion site. Problem: Skin Integrity:  Goal: Demonstration of wound healing without infection will improve  Description  Demonstration of wound healing without infection will improve  Outcome: Ongoing  Note:   Will monitor skin and mucous members. Will turn patient every 2 hours, monitor for friction and sheering, and change dressings as needed. Will preform skin assessment every shift. Goal: Complications related to intravenous access or infusion will be avoided or minimized  Description  Complications related to intravenous access or infusion will be avoided or minimized  Outcome: Ongoing  Note:   Will monitor skin and mucous members. Will turn patient every 2 hours, monitor for friction and sheering, and change dressings as needed. Will preform skin assessment every shift. Problem: Discharge Planning:  Goal: Discharged to appropriate level of care  Description  Discharged to appropriate level of care  Outcome: Ongoing  Note:   Assessed patients knowledge of discharge. Will continue to work with patient on discharge planning and answer patient questions. Will consult case management and  as necessary. Problem: Mobility - Impaired:  Goal: Mobility will improve  Description  Mobility will improve  Outcome: Ongoing  Note:   Early and frequent ambulqtion encouraged. Educated patient on importance of early ambulation. Patient assisted with ambulation. Will continue to monitor. Problem: Infection - Surgical Site:  Goal: Will show no infection signs and symptoms  Description  Will show no infection signs and symptoms  Outcome: Ongoing  Note:   No increased swelling, redness, or warmth noted in patient's surgical site. Educated patient on signs and symptoms of infection. Will continue to monitor. Problem: Nutrition  Goal: Optimal nutrition therapy  Outcome: Ongoing  Note:   Educated patient on importance of proper nutrition. Intake is being recorded to ensure optimal nutrition. Will consult dietitian as needed.

## 2020-02-21 NOTE — PLAN OF CARE
Problem: Pain:  Goal: Pain level will decrease  Description  Pain level will decrease  2/21/2020 0735 by David Vazquez RN  Outcome: Ongoing  Note:   Pt assessed for pain. Pt in 8/10 pain and assessed with 0-10 pain rating scale. Pt given prescribed analgesic for pain. (See eMar) Pt satisfied with pain relief thus far. Will reassess and continue to monitor. Electronically signed by David Vazquez RN on 2/21/2020 at 7:35 AM      2/20/2020 2237 by Maurice Crook RN  Outcome: Ongoing  Note:   Educated patient on pain management. Will assess patients pain level per unit protocol, and provide pain management measures as needed. Goal: Control of acute pain  Description  Control of acute pain  2/21/2020 0735 by David Vazquez RN  Outcome: Ongoing  2/20/2020 2237 by Maurice Crook RN  Outcome: Ongoing  Note:   Patient educated on acute pain. Taught patient to use call light to ask for pain medication. PRN pain medication given for acute pain. Will continue to monitor pain per unit protocol. Goal: Control of chronic pain  Description  Control of chronic pain  2/21/2020 0735 by David Vazquez RN  Outcome: Ongoing  2/20/2020 2237 by Maurice Crook RN  Outcome: Ongoing  Note:   Patient educated on chronic pain. Taught patient to use call light to ask for pain medication. Will continue to monitor pain per unit protocol. Problem: Falls - Risk of:  Goal: Will remain free from falls  Description  Will remain free from falls  2/21/2020 0735 by David Vazquez RN  Outcome: Ongoing  Note:   Fall risk assessment completed. Fall precautions in place. Call light within reach. Pt educated on calling for assistance before getting up. Walkway free of clutter. Patient bed alarm intact, encouraged patient to call for assistance especially if feeling dizzy, SOB, or weakness. Will continue to monitor.  Electronically signed by David Vazquez RN on 2/21/2020 at 7:35 AM      2/20/2020 2237 by Maurice Crook RN  Outcome: infection signs or symptoms at PICC insertion site. Problem: Skin Integrity:  Goal: Demonstration of wound healing without infection will improve  Description  Demonstration of wound healing without infection will improve  2/21/2020 0735 by Hector Smith RN  Outcome: Ongoing  Note:   Lungs sound clear bilaterally. WBC WNLs. No signs/symptoms of infection noted. Will cont to monitor and reassess. Electronically signed by Hector Smith RN on 2/21/2020 at 7:36 AM      2/20/2020 2237 by Dianna Townsend RN  Outcome: Ongoing  Note:   Will monitor skin and mucous members. Will turn patient every 2 hours, monitor for friction and sheering, and change dressings as needed. Will preform skin assessment every shift. Goal: Complications related to intravenous access or infusion will be avoided or minimized  Description  Complications related to intravenous access or infusion will be avoided or minimized  2/21/2020 0735 by Hector Smith RN  Outcome: Ongoing  2/20/2020 2237 by Dianna Townsend RN  Outcome: Ongoing  Note:   Will monitor skin and mucous members. Will turn patient every 2 hours, monitor for friction and sheering, and change dressings as needed. Will preform skin assessment every shift. Problem: Discharge Planning:  Goal: Discharged to appropriate level of care  Description  Discharged to appropriate level of care  2/21/2020 0735 by Hector Smith RN  Outcome: Ongoing  Note:   Patient to DC home when deemed appropriately by MD. Electronically signed by Hector Smith RN on 2/21/2020 at 7:36 AM    2/20/2020 2237 by Dianna Townsend RN  Outcome: Ongoing  Note:   Assessed patients knowledge of discharge. Will continue to work with patient on discharge planning and answer patient questions. Will consult case management and  as necessary.        Problem: Mobility - Impaired:  Goal: Mobility will improve  Description  Mobility will improve  2/21/2020 0735 by Hector Smith RN  Outcome: Ongoing  Note:   Patient up ad agustina. Electronically signed by Josue Elizabeth RN on 2/21/2020 at 7:37 AM    2/20/2020 2237 by Jacqueline Nova RN  Outcome: Ongoing  Note:   Early and frequent ambulqtion encouraged. Educated patient on importance of early ambulation. Patient assisted with ambulation. Will continue to monitor. Problem: Infection - Surgical Site:  Goal: Will show no infection signs and symptoms  Description  Will show no infection signs and symptoms  2/21/2020 0735 by Josue Elizabeth RN  Outcome: Ongoing  Note:   Lungs sound clear bilaterally. WBC WNLs. No signs/symptoms of infection noted. Will cont to monitor and reassess. Electronically signed by Josue Elizabeth RN on 2/21/2020 at 7:37 AM      2/20/2020 2237 by Jacqueline Nova RN  Outcome: Ongoing  Note:   No increased swelling, redness, or warmth noted in patient's surgical site. Educated patient on signs and symptoms of infection. Will continue to monitor. Problem: Nutrition  Goal: Optimal nutrition therapy  2/21/2020 0735 by Josue Elizabeth RN  Outcome: Ongoing  2/20/2020 2237 by Jacqueline Nova RN  Outcome: Ongoing  Note:   Educated patient on importance of proper nutrition. Intake is being recorded to ensure optimal nutrition. Will consult dietitian as needed.

## 2020-02-22 ENCOUNTER — CARE COORDINATION (OUTPATIENT)
Dept: CASE MANAGEMENT | Age: 50
End: 2020-02-22

## 2020-02-25 ENCOUNTER — TELEPHONE (OUTPATIENT)
Dept: ORTHOPEDIC SURGERY | Age: 50
End: 2020-02-25

## 2020-02-25 LAB
ANION GAP SERPL CALCULATED.3IONS-SCNC: 7 MMOL/L (ref 6–18)
BASOPHILS ABSOLUTE: 0.1 THOU/MCL (ref 0–0.2)
BASOPHILS ABSOLUTE: 1 %
BUN BLDV-MCNC: 10 MG/DL (ref 8–26)
C-REACTIVE PROTEIN WIDE RANGE: 5.3 MG/L
CALCIUM SERPL-MCNC: 9.2 MG/DL (ref 8.5–10.5)
CHLORIDE BLD-SCNC: 105 MEQ/L (ref 101–111)
CO2: 26 MMOL/L (ref 24–36)
CREAT SERPL-MCNC: 1 MG/DL (ref 0.64–1.27)
CREATINE KINASE ISOENZYMES, SER/PLAS: 57 IU/L (ref 0–200)
EOSINOPHILS ABSOLUTE: 0.4 THOU/MCL (ref 0.03–0.45)
EOSINOPHILS RELATIVE PERCENT: 6 %
GFR AFRICAN AMERICAN: 101 ML/MIN/1.73 M2
GFR NON-AFRICAN AMERICAN: 87 ML/MIN/1.73 M2
GLUCOSE BLD-MCNC: 112 MG/DL (ref 70–99)
HCT VFR BLD CALC: 36.9 % (ref 40–50)
HEMOGLOBIN: 12.2 G/DL (ref 13.5–16.5)
LYMPHOCYTES ABSOLUTE: 2.3 THOU/MCL (ref 1–4)
LYMPHOCYTES RELATIVE PERCENT: 35 %
MCH RBC QN AUTO: 29.9 PG (ref 27–33)
MCHC RBC AUTO-ENTMCNC: 33 G/DL (ref 32–36)
MCV RBC AUTO: 90.6 FL (ref 82–97)
MONOCYTES # BLD: 8 %
MONOCYTES ABSOLUTE: 0.6 THOU/MCL (ref 0.2–0.9)
NEUTROPHILS ABSOLUTE: 3.3 THOU/MCL (ref 1.8–7.7)
PDW BLD-RTO: 12.6 %
PLATELET # BLD: 379 THOU/MCL (ref 140–375)
PMV BLD AUTO: 9.2 FL (ref 7.4–11.5)
POTASSIUM SERPL-SCNC: 4.5 MEQ/L (ref 3.6–5.1)
RBC # BLD: 4.08 MIL/MCL (ref 4.4–5.8)
SEDIMENTATION RATE, ERYTHROCYTE: 13 MM/HR (ref 0–15)
SEG NEUTROPHILS: 50 %
SODIUM BLD-SCNC: 138 MEQ/L (ref 135–145)
WBC # BLD: 6.7 THOU/MCL (ref 3.6–10.5)

## 2020-02-27 ENCOUNTER — OFFICE VISIT (OUTPATIENT)
Dept: ORTHOPEDIC SURGERY | Age: 50
End: 2020-02-27

## 2020-02-27 VITALS — TEMPERATURE: 98.3 F

## 2020-02-27 PROCEDURE — 99024 POSTOP FOLLOW-UP VISIT: CPT | Performed by: ORTHOPAEDIC SURGERY

## 2020-02-27 NOTE — PROGRESS NOTES
This dictation was done with [employer] dictation and may contain mechanical errors related to translation.   Temperature 98.3 °F (36.8 °C), temperature source Temporal.

## 2020-02-28 ENCOUNTER — OFFICE VISIT (OUTPATIENT)
Dept: FAMILY MEDICINE CLINIC | Age: 50
End: 2020-02-28
Payer: COMMERCIAL

## 2020-02-28 ENCOUNTER — TELEPHONE (OUTPATIENT)
Dept: ORTHOPEDIC SURGERY | Age: 50
End: 2020-02-28

## 2020-02-28 VITALS
HEART RATE: 64 BPM | WEIGHT: 256 LBS | OXYGEN SATURATION: 98 % | BODY MASS INDEX: 34.72 KG/M2 | DIASTOLIC BLOOD PRESSURE: 70 MMHG | SYSTOLIC BLOOD PRESSURE: 104 MMHG

## 2020-02-28 PROBLEM — M25.361 INSTABILITY OF KNEE JOINT, RIGHT: Status: RESOLVED | Noted: 2019-09-11 | Resolved: 2020-02-28

## 2020-02-28 PROBLEM — E44.0 MODERATE MALNUTRITION (HCC): Status: RESOLVED | Noted: 2020-02-19 | Resolved: 2020-02-28

## 2020-02-28 PROCEDURE — 99495 TRANSJ CARE MGMT MOD F2F 14D: CPT | Performed by: FAMILY MEDICINE

## 2020-02-28 PROCEDURE — 1111F DSCHRG MED/CURRENT MED MERGE: CPT | Performed by: FAMILY MEDICINE

## 2020-02-28 RX ORDER — PANTOPRAZOLE SODIUM 40 MG/1
40 TABLET, DELAYED RELEASE ORAL 2 TIMES DAILY
COMMUNITY
Start: 2020-02-28 | End: 2021-08-25 | Stop reason: SDUPTHER

## 2020-02-28 RX ORDER — OXYCODONE HYDROCHLORIDE 5 MG/1
5 TABLET ORAL EVERY 4 HOURS PRN
Status: ON HOLD | COMMUNITY
End: 2020-04-04

## 2020-02-28 RX ORDER — OXYCODONE HYDROCHLORIDE 5 MG/1
5 TABLET ORAL EVERY 4 HOURS PRN
Qty: 30 TABLET | Refills: 0 | Status: SHIPPED | OUTPATIENT
Start: 2020-02-28 | End: 2020-03-04 | Stop reason: SDUPTHER

## 2020-02-28 RX ORDER — DAPTOMYCIN 50 MG/ML
INJECTION, POWDER, LYOPHILIZED, FOR SOLUTION INTRAVENOUS
COMMUNITY
Start: 2020-02-19 | End: 2020-03-21

## 2020-02-28 ASSESSMENT — ENCOUNTER SYMPTOMS
ALLERGIC/IMMUNOLOGIC NEGATIVE: 1
EYES NEGATIVE: 1
GASTROINTESTINAL NEGATIVE: 1
RESPIRATORY NEGATIVE: 1

## 2020-02-28 NOTE — PROGRESS NOTES
afterwards. Had polyethylene exchange again in January. Again had a wound infection. Admitted for debridement and wound vac 12/18. Initial infection was   Dr Dylan Stevens is on the case. 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 Treated previously with IV Vanco for 1 month. This hospitalization changed to daptomycin and IV Cefipime BID. Doing wound management per Dr Lizzie Pardo via home health. Joint cx from 2/18 is still 'no growth'    He thinks he is seeing decreased knee drainage in his wound vac. No bleeding. Pain level is improving- using oxycodone ONLY when he has dressing changes. tylenol otherwise. He is getting home PT- but has not even had the eval yet. Waiting for home PT, but is doing home exercises 3x a day. Plans to return to outpatient therapy after infection treatment is concluded. Plans to see Dr Cheryle Slate 3/18. No n/v/d. No abd pain. Mood: stable, about at baseline depression- sick and tired of being sick and tired. On fluox. Has hope for improvement. vapes nicotine. He usually takes protonix 40 mg BID (per Dr Areli Adkins) due to GERD, Vaughn's, prior gastric bypass. Inpatient course: Discharge summary reviewed- see chart. Interval history/Current status: see above. Last renal function test: normal. Has solitary kidney.   Lab Results   Component Value Date     02/25/2020    K 4.5 02/25/2020    K 4.2 02/01/2020    BUN 10 02/25/2020    CREATININE 1.00 02/25/2020        Lab Results   Component Value Date    WBC 6.7 02/25/2020    HGB 12.2 (L) 02/25/2020    HCT 36.9 (L) 02/25/2020    MCV 90.6 02/25/2020     (H) 02/25/2020         Lab Results   Component Value Date    LABA1C 4.9 02/18/2020    LABA1C 5.2 12/18/2019    LABA1C 5.2 12/03/2019      Lab Results   Component Value Date    TSHREFLEX 1.72 03/26/2019    TSHREFLEX 0.85 12/18/2018        Review of Systems   Constitutional: Negative. HENT: Negative. Eyes: Negative. Respiratory: Negative. Cardiovascular: Negative. Gastrointestinal: Negative. Endocrine: Negative. Genitourinary: Negative. Musculoskeletal: Negative. Skin: Negative. Allergic/Immunologic: Negative. Neurological: Negative. Hematological: Negative. Psychiatric/Behavioral: Negative. Vitals:    02/28/20 1338   BP: 104/70   Site: Right Upper Arm   Position: Sitting   Cuff Size: Medium Adult   Pulse: 64   SpO2: 98%   Weight: 256 lb (116.1 kg)     Body mass index is 34.72 kg/m². Wt Readings from Last 3 Encounters:   02/28/20 256 lb (116.1 kg)   02/21/20 257 lb 8 oz (116.8 kg)   02/17/20 261 lb 9.6 oz (118.7 kg)     BP Readings from Last 3 Encounters:   02/28/20 104/70   02/21/20 (!) 115/58   02/18/20 (!) 104/58       Physical Exam  Vitals signs and nursing note reviewed. Constitutional:       General: He is not in acute distress. Appearance: Normal appearance. He is well-developed. He is not diaphoretic. Neck:      Musculoskeletal: Normal range of motion and neck supple. Cardiovascular:      Rate and Rhythm: Normal rate and regular rhythm. Pulses: Normal pulses. Heart sounds: Normal heart sounds. No murmur. Pulmonary:      Effort: Pulmonary effort is normal. No respiratory distress. Breath sounds: Normal breath sounds. No wheezing or rales. Musculoskeletal:      Right lower leg: No edema. Left lower leg: No edema. Comments: R knee: wound vac in place. Knee is mildly warm, but not red or tender. Lymphadenopathy:      Cervical: No cervical adenopathy. Skin:     General: Skin is warm and dry. Neurological:      General: No focal deficit present. Mental Status: He is alert and oriented to person, place, and time. Mental status is at baseline. Psychiatric:         Mood and Affect: Mood normal.         Behavior: Behavior normal.         Thought Content:  Thought content normal. Judgment: Judgment normal.       Assessment/Plan:    1. Hospital discharge follow-up  - dong well. No new issues. - [place] DISCHARGE MEDS RECONCILED W/ CURRENT OUTPATIENT MED LIST    2. Failed total knee, right, initial encounter ([facility] [address]  - s/p tibial polyethylene insert exchange; rehab on hold awaiting clearance of infection of joint. 3. Infection of total knee replacement, subsequent encounter  - IV abx per DR Stephen Carrero (Cubicin and Cefepime). Repeat cultures sterile. Pain improving. Seems to be progressing well. 4. 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    5. History of depression  - mood has suffered as a part of these medical set-backs. But he declines the offer to restart antidepressant meds. Medical Decision Making: moderate complexity      Routine f/u with me.

## 2020-02-28 NOTE — TELEPHONE ENCOUNTER
Refill: pain medication. Patient that this was being down after his appointment yesterday but nothing was sent to the pharmacy.      [facility]          Please call   [phone]

## 2020-03-02 RX ORDER — FLUOXETINE HYDROCHLORIDE 20 MG/1
CAPSULE ORAL
Qty: 90 CAPSULE | Refills: 0 | Status: ON HOLD | OUTPATIENT
Start: 2020-03-02 | End: 2020-04-07 | Stop reason: SDUPTHER

## 2020-03-03 ENCOUNTER — TELEPHONE (OUTPATIENT)
Dept: INFECTIOUS DISEASES | Age: 50
End: 2020-03-03

## 2020-03-03 LAB
ANION GAP SERPL CALCULATED.3IONS-SCNC: 8 MMOL/L (ref 6–18)
BASOPHILS ABSOLUTE: 0.1 THOU/MCL (ref 0–0.2)
BASOPHILS ABSOLUTE: 2 %
BUN BLDV-MCNC: 6 MG/DL (ref 8–26)
C-REACTIVE PROTEIN WIDE RANGE: 5.9 MG/L
CALCIUM SERPL-MCNC: 9 MG/DL (ref 8.5–10.5)
CHLORIDE BLD-SCNC: 106 MEQ/L (ref 101–111)
CO2: 24 MMOL/L (ref 24–36)
CREAT SERPL-MCNC: 1.16 MG/DL (ref 0.64–1.27)
CREATINE KINASE ISOENZYMES, SER/PLAS: 66 IU/L (ref 0–200)
EOSINOPHILS ABSOLUTE: 0.3 THOU/MCL (ref 0.03–0.45)
EOSINOPHILS RELATIVE PERCENT: 6 %
GFR AFRICAN AMERICAN: 84 ML/MIN/1.73 M2
GFR NON-AFRICAN AMERICAN: 73 ML/MIN/1.73 M2
GLUCOSE BLD-MCNC: 102 MG/DL (ref 70–99)
HCT VFR BLD CALC: 39 % (ref 40–50)
HEMOGLOBIN: 12.7 G/DL (ref 13.5–16.5)
LYMPHOCYTES ABSOLUTE: 1.3 THOU/MCL (ref 1–4)
LYMPHOCYTES RELATIVE PERCENT: 25 %
MCH RBC QN AUTO: 29 PG (ref 27–33)
MCHC RBC AUTO-ENTMCNC: 32.5 G/DL (ref 32–36)
MCV RBC AUTO: 89.4 FL (ref 82–97)
MONOCYTES # BLD: 12 %
MONOCYTES ABSOLUTE: 0.6 THOU/MCL (ref 0.2–0.9)
NEUTROPHILS ABSOLUTE: 2.8 THOU/MCL (ref 1.8–7.7)
PDW BLD-RTO: 13.3 %
PLATELET # BLD: 359 THOU/MCL (ref 140–375)
PMV BLD AUTO: 9.7 FL (ref 7.4–11.5)
POTASSIUM SERPL-SCNC: 4.2 MEQ/L (ref 3.6–5.1)
RBC # BLD: 4.37 MIL/MCL (ref 4.4–5.8)
SEDIMENTATION RATE, ERYTHROCYTE: 4 MM/HR (ref 0–15)
SEG NEUTROPHILS: 55 %
SODIUM BLD-SCNC: 138 MEQ/L (ref 135–145)
WBC # BLD: 5.1 THOU/MCL (ref 3.6–10.5)

## 2020-03-03 RX ORDER — DOXYCYCLINE HYCLATE 100 MG
100 TABLET ORAL 2 TIMES DAILY
Qty: 60 TABLET | Refills: 1 | Status: SHIPPED | OUTPATIENT
Start: 2020-03-03 | End: 2020-04-02

## 2020-03-03 NOTE — TELEPHONE ENCOUNTER
[facility] to report patients PICC line is almost all the way out. It is out 27cm. Jessica  nurse is asking if she should pull it and can patient get switched to PO. Term date is 3/13/20 or does he need to go get a new PICC placed.  Patient reported to Jessica [facility] nurse he does not want to go today due to \"working on a musical.\"

## 2020-03-03 NOTE — TELEPHONE ENCOUNTER
Spoke with [facility] nurse and patient and made aware. Patient has atb to go through Friday. Spoke with Xuan with [facility] and made aware. They will send out supplies for peripheral IV.

## 2020-03-04 ENCOUNTER — TELEPHONE (OUTPATIENT)
Dept: INFECTIOUS DISEASES | Age: 50
End: 2020-03-04

## 2020-03-05 ENCOUNTER — CARE COORDINATION (OUTPATIENT)
Dept: CASE MANAGEMENT | Age: 50
End: 2020-03-05

## 2020-03-05 RX ORDER — OXYCODONE HYDROCHLORIDE 5 MG/1
5 TABLET ORAL EVERY 6 HOURS PRN
Qty: 28 TABLET | Refills: 0 | Status: SHIPPED | OUTPATIENT
Start: 2020-03-05 | End: 2020-03-11 | Stop reason: SDUPTHER

## 2020-03-05 NOTE — TELEPHONE ENCOUNTER
Tala Alejandre, nurse at [facility], [phone] - was given confirmation that PICC line can be pulled.

## 2020-03-07 NOTE — PROGRESS NOTES
Patient is a 45-year-old male we have been following status post revision total knee arthroplasty status post 1 I&D with poly-exchange after his revision and that now status post a second I&D of the superficial wound. Patient underwent knee aspiration at the time of my debridement of his wound and placement of wound VAC. Thus far the synovial analysis has been negative for infection or for markers of infection. Patient is here for for further evaluation and follow-up. Physical examination 45-year-old male oriented x3 temperature is 98.3. Examination of his her right knee shows a wound VAC in place and no obvious cellulitic changes about the wound. His range of motion is decreased secondary to pain. No obvious signs of instability of his knee joint are noted. Distal neurovascular examinations intact to the right foot and ankle. Impression 45-year-old male struggling to heal his wound postop revision total knee arthroplasty postop I&D with poly-exchange and postop I&D of his wound again. Patient has multiple issues such as he smokes and also has some problem with protein malabsorption secondary to his gastric bypass surgery in the past.  Plan continue wound VAC therapy follow-up on a weekly basis for evaluation.

## 2020-03-08 ENCOUNTER — HOSPITAL ENCOUNTER (EMERGENCY)
Age: 50
Discharge: HOME | End: 2020-03-08
Attending: EMERGENCY MEDICINE
Payer: COMMERCIAL

## 2020-03-08 VITALS
HEIGHT: 72 IN | HEART RATE: 78 BPM | RESPIRATION RATE: 18 BRPM | SYSTOLIC BLOOD PRESSURE: 129 MMHG | DIASTOLIC BLOOD PRESSURE: 77 MMHG | OXYGEN SATURATION: 98 % | BODY MASS INDEX: 32.91 KG/M2 | WEIGHT: 242.95 LBS | TEMPERATURE: 98 F

## 2020-03-08 LAB
ANION GAP SERPL CALCULATED.3IONS-SCNC: 12 MMOL/L (ref 3–16)
BASOPHILS ABSOLUTE: 0.2 K/UL (ref 0–0.2)
BASOPHILS RELATIVE PERCENT: 2.3 %
BUN BLDV-MCNC: 4 MG/DL (ref 7–20)
CALCIUM SERPL-MCNC: 9.3 MG/DL (ref 8.3–10.6)
CHLORIDE BLD-SCNC: 105 MMOL/L (ref 99–110)
CO2: 24 MMOL/L (ref 21–32)
CREAT SERPL-MCNC: 0.9 MG/DL (ref 0.9–1.3)
EOSINOPHILS ABSOLUTE: 0.1 K/UL (ref 0–0.6)
EOSINOPHILS RELATIVE PERCENT: 1.4 %
GFR AFRICAN AMERICAN: >60
GFR NON-AFRICAN AMERICAN: >60
GLUCOSE BLD-MCNC: 103 MG/DL (ref 70–99)
HCT VFR BLD CALC: 40 % (ref 40.5–52.5)
HEMOGLOBIN: 12.9 G/DL (ref 13.5–17.5)
LACTIC ACID: 0.9 MMOL/L (ref 0.4–2)
LYMPHOCYTES ABSOLUTE: 3.1 K/UL (ref 1–5.1)
LYMPHOCYTES RELATIVE PERCENT: 40.2 %
MCH RBC QN AUTO: 28.7 PG (ref 26–34)
MCHC RBC AUTO-ENTMCNC: 32.3 G/DL (ref 31–36)
MCV RBC AUTO: 88.7 FL (ref 80–100)
MONOCYTES ABSOLUTE: 0.6 K/UL (ref 0–1.3)
MONOCYTES RELATIVE PERCENT: 7.2 %
NEUTROPHILS ABSOLUTE: 3.7 K/UL (ref 1.7–7.7)
NEUTROPHILS RELATIVE PERCENT: 48.9 %
PDW BLD-RTO: 14.2 % (ref 12.4–15.4)
PLATELET # BLD: 365 K/UL (ref 135–450)
PMV BLD AUTO: 8.6 FL (ref 5–10.5)
POTASSIUM REFLEX MAGNESIUM: 3.9 MMOL/L (ref 3.5–5.1)
RBC # BLD: 4.5 M/UL (ref 4.2–5.9)
SODIUM BLD-SCNC: 141 MMOL/L (ref 136–145)
TROPONIN: <0.01 NG/ML
WBC # BLD: 7.7 K/UL (ref 4–11)

## 2020-03-08 PROCEDURE — 36415 COLL VENOUS BLD VENIPUNCTURE: CPT

## 2020-03-08 PROCEDURE — 93005 ELECTROCARDIOGRAM TRACING: CPT | Performed by: EMERGENCY MEDICINE

## 2020-03-08 PROCEDURE — 99283 EMERGENCY DEPT VISIT LOW MDM: CPT

## 2020-03-08 PROCEDURE — 85025 COMPLETE CBC W/AUTO DIFF WBC: CPT

## 2020-03-08 PROCEDURE — 80048 BASIC METABOLIC PNL TOTAL CA: CPT

## 2020-03-08 PROCEDURE — 84484 ASSAY OF TROPONIN QUANT: CPT

## 2020-03-08 PROCEDURE — 83605 ASSAY OF LACTIC ACID: CPT

## 2020-03-08 ASSESSMENT — ENCOUNTER SYMPTOMS
VOMITING: 0
RHINORRHEA: 0
EYE REDNESS: 0
EYE PAIN: 0
NAUSEA: 0
BACK PAIN: 1
EYE DISCHARGE: 0
WHEEZING: 0
COUGH: 0
SORE THROAT: 0
ABDOMINAL PAIN: 0
DIARRHEA: 0
SHORTNESS OF BREATH: 0

## 2020-03-08 ASSESSMENT — PAIN DESCRIPTION - ORIENTATION: ORIENTATION: RIGHT

## 2020-03-08 ASSESSMENT — PAIN DESCRIPTION - LOCATION: LOCATION: KNEE

## 2020-03-08 ASSESSMENT — PAIN DESCRIPTION - DESCRIPTORS: DESCRIPTORS: ACHING;THROBBING;TENDER

## 2020-03-08 ASSESSMENT — PAIN SCALES - GENERAL
PAINLEVEL_OUTOF10: 0
PAINLEVEL_OUTOF10: 5
PAINLEVEL_OUTOF10: 0

## 2020-03-08 ASSESSMENT — PAIN DESCRIPTION - FREQUENCY: FREQUENCY: CONTINUOUS

## 2020-03-08 ASSESSMENT — PAIN DESCRIPTION - PROGRESSION
CLINICAL_PROGRESSION: GRADUALLY WORSENING
CLINICAL_PROGRESSION: RESOLVED

## 2020-03-08 ASSESSMENT — PAIN - FUNCTIONAL ASSESSMENT: PAIN_FUNCTIONAL_ASSESSMENT: PREVENTS OR INTERFERES WITH MANY ACTIVE NOT PASSIVE ACTIVITIES

## 2020-03-08 ASSESSMENT — PAIN DESCRIPTION - PAIN TYPE: TYPE: ACUTE PAIN

## 2020-03-09 ENCOUNTER — PATIENT MESSAGE (OUTPATIENT)
Dept: PSYCHIATRY | Age: 50
End: 2020-03-09

## 2020-03-09 ENCOUNTER — OFFICE VISIT (OUTPATIENT)
Dept: ORTHOPEDIC SURGERY | Age: 50
End: 2020-03-09

## 2020-03-09 ENCOUNTER — OFFICE VISIT (OUTPATIENT)
Dept: PULMONOLOGY | Age: 50
End: 2020-03-09
Payer: COMMERCIAL

## 2020-03-09 VITALS
BODY MASS INDEX: 32.91 KG/M2 | HEART RATE: 62 BPM | WEIGHT: 243 LBS | HEIGHT: 72 IN | DIASTOLIC BLOOD PRESSURE: 72 MMHG | SYSTOLIC BLOOD PRESSURE: 102 MMHG | OXYGEN SATURATION: 99 %

## 2020-03-09 VITALS — HEIGHT: 72 IN | TEMPERATURE: 97.5 F | BODY MASS INDEX: 32.78 KG/M2 | WEIGHT: 242 LBS

## 2020-03-09 LAB
CULTURE, JOINT AEROBIC: NORMAL
CULTURE, JOINT ANAEROBIC: NORMAL
EKG ATRIAL RATE: 55 BPM
EKG DIAGNOSIS: NORMAL
EKG P AXIS: -10 DEGREES
EKG P-R INTERVAL: 160 MS
EKG Q-T INTERVAL: 440 MS
EKG QRS DURATION: 96 MS
EKG QTC CALCULATION (BAZETT): 420 MS
EKG R AXIS: 43 DEGREES
EKG T AXIS: 26 DEGREES
EKG VENTRICULAR RATE: 55 BPM
GRAM STAIN RESULT: NORMAL

## 2020-03-09 PROCEDURE — 99024 POSTOP FOLLOW-UP VISIT: CPT | Performed by: ORTHOPAEDIC SURGERY

## 2020-03-09 PROCEDURE — 93010 ELECTROCARDIOGRAM REPORT: CPT | Performed by: INTERNAL MEDICINE

## 2020-03-09 PROCEDURE — 99214 OFFICE O/P EST MOD 30 MIN: CPT | Performed by: NURSE PRACTITIONER

## 2020-03-09 RX ORDER — ZOLPIDEM TARTRATE 10 MG/1
10 TABLET ORAL NIGHTLY PRN
Qty: 90 TABLET | Refills: 1 | Status: SHIPPED | OUTPATIENT
Start: 2020-03-09 | End: 2020-06-07

## 2020-03-09 ASSESSMENT — ENCOUNTER SYMPTOMS
EYE PAIN: 0
COUGH: 0
SHORTNESS OF BREATH: 0
RHINORRHEA: 0
ABDOMINAL DISTENTION: 0
APNEA: 0
EYE REDNESS: 0
ABDOMINAL PAIN: 0
SINUS PRESSURE: 0

## 2020-03-09 ASSESSMENT — SLEEP AND FATIGUE QUESTIONNAIRES
HOW LIKELY ARE YOU TO NOD OFF OR FALL ASLEEP WHILE LYING DOWN TO REST IN THE AFTERNOON WHEN CIRCUMSTANCES PERMIT: 1
HOW LIKELY ARE YOU TO NOD OFF OR FALL ASLEEP WHEN YOU ARE A PASSENGER IN A CAR FOR AN HOUR WITHOUT A BREAK: 0
HOW LIKELY ARE YOU TO NOD OFF OR FALL ASLEEP WHILE SITTING INACTIVE IN A PUBLIC PLACE: 0
ESS TOTAL SCORE: 2
HOW LIKELY ARE YOU TO NOD OFF OR FALL ASLEEP IN A CAR, WHILE STOPPED FOR A FEW MINUTES IN TRAFFIC: 0
HOW LIKELY ARE YOU TO NOD OFF OR FALL ASLEEP WHILE SITTING QUIETLY AFTER LUNCH WITHOUT ALCOHOL: 0
HOW LIKELY ARE YOU TO NOD OFF OR FALL ASLEEP WHILE SITTING AND READING: 0
HOW LIKELY ARE YOU TO NOD OFF OR FALL ASLEEP WHILE WATCHING TV: 1
HOW LIKELY ARE YOU TO NOD OFF OR FALL ASLEEP WHILE SITTING AND TALKING TO SOMEONE: 0

## 2020-03-09 NOTE — PROGRESS NOTES
Angelo Willson MD, FAASM, [facility]  Mercedes Dave, MSN, RN, CNP     [address] [facility]IC[facility]  [address] [address] [facility] [address]  Dept: [phone]  Dept Fax: [phone]: [facility] SLE[facility]IC[facility]  [address] [place] [id number] [phone]    Subjective:     Patient ID: Fran Resendiz is a 51 y.o. male. Chief Complaint   Patient presents with    Sleep Apnea       HPI:      Machine Present today:  Yes    Machine Modem/Download Info:  Compliance (hours/night): 6.7 hrs/night  Download AHI (/hour): 2.9 /HR     Average IPAP Pressure: 15.3 cmH2O  Average EPAP Pressure: 11.2 cmH2O         AUTO BIPAP - Settings (Christina)  IPAP Max: 25 cmH2O  EPAP Min: 10 cmH2O  Pressure Support Min: 3  Pressure Support Max: 7             Comfort Settings  Humidity Level (0-8): 1  Heated Tubing (Yes/No): Yes  Flex/EPR (0-3): 3 PAP Mask  Mask Type: Full Face mask  Mask Model: Alejandra View  Mask Size: sm     Hamlin - Total score: 2    Follow-up :     Last Visit : September 2019      Patient reports the listed chronic Co-morbidities: GERD, Obesity   are well controlled and stable at this time. Subjective Health Changes: R Knee revision 12/2019, Revision infection 1/2020, debridement with wound vac 2/2020     Over Night Oximetry: [] Yes  [] No  [x] NA [] WNL   Using O2: [] Yes  [] No  [x] NA   Patient is compliant with the machine  [x] Yes  [] No   Feeling rested when using the machine   [x] Yes  [] No     Pressure is comfortable with inspiration and expiration  [x] Yes  [] No     Noticed changes in pressure   [] Yes  [] No  [x] NA   Mask is fitting well  [x] Yes  [] No   Noting Mask Air Leak  [] Yes  [x] No   Having painful Aerophagia  [] Yes  [x] No   Nocturia   1  per night.    Having  HA upon waking  [] Yes  [x] No   Dry mouth upon waking   Dry Nose  Dry Eyes  [x] Yes  [] No   Congestion upon waking Prateek Rossi MD   doxycycline hyclate (VIBRA-TABS) 100 MG tablet Take 1 tablet by mouth 2 times daily 3/3/20 4/2/20 Yes Baljinder Serrano MD   FLUoxetine (PROZAC) 20 MG capsule TAKE 1 CAPSULE BY MOUTH DAILY 3/2/20  Yes Johnathon Moore MD   DAPTOmycin (CUBICIN) 500 MG injection Infuse intravenously 2/19/20  Yes Historical Provider, MD   sodium chloride 0.9 % SOLN 50 mL with ceFEPIme 2 g SOLR 2 g Infuse 2 g intravenously 2/18/20  Yes Historical Provider, MD   oxyCODONE (ROXICODONE) 5 MG immediate release tablet Take 5 mg by mouth every 4 hours as needed for Pain. Yes Historical Provider, MD   pantoprazole (PROTONIX) 40 MG tablet Take 1 tablet by mouth 2 times daily 2/28/20  Yes Johnathon Moore MD   dicyclomine (BENTYL) 20 MG tablet Take 40 mg by mouth 2 times daily as needed 12/1/19  Yes Historical Provider, MD   atorvastatin (LIPITOR) 40 MG tablet TAKE 1 TABLET BY MOUTH EVERY NIGHT AT BEDTIME 12/10/19  Yes Johnathon Moore MD   traZODone (DESYREL) 100 MG tablet Take 2 tablets by mouth nightly 12/2/19  Yes Jassi Whitehead MD   sildenafil (REVATIO) 20 MG tablet Take 4 tablets by mouth as needed (30 min prior to intercourse) 6/12/19  Yes Johnathon Moore MD   calcium-vitamin D (OSCAL 500/200 D-3) 500-200 MG-UNIT per tablet Take 1 tablet by mouth 2 times daily    Yes Historical Provider, MD   Multiple Vitamin TABS Take 1 tablet by mouth daily    Yes Historical Provider, MD   aspirin EC 81 MG EC tablet Take 1 tablet by mouth 2 times daily for 14 days Please avoid missing doses.  2/19/20 3/4/20  LILLIE Cruz - CNP       Allergies as of 03/09/2020 - Review Complete 03/09/2020   Allergen Reaction Noted    Nsaids Nausea Only and Other (See Comments) 07/11/2013    Prochlorperazine Other (See Comments) 12/30/2015    Valtrex [valacyclovir hcl] Diarrhea 08/09/2019    Morphine Rash 04/26/2016    Morphine and related Itching 11/29/2016    Tolmetin Nausea Only 07/11/2013       Patient Active Problem List   Diagnosis    Insomnia-chronic intermittent--uses prn ambien--to be filled by dr Marek Reilly (sleep dr)   Christiano Salcido daily ppi-last egd 5/15--dr Eufemia Murray History of tobacco useadvised to quit- quit 7/1/2014    Allergic rhinitis, seasonal    Obstructive sleep apnea,Severe -(on cpap)    Class 1 obesity due to excess calories with body mass index (BMI) of 34.0 to 34.9 in adult    Depression-on daily effexor xr--sees dr Bernadine Marks    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  Chondromalacia of patellofemoral joint    Chronic pain syndrome    History of stroke    Gastroesophageal reflux disease with esophagitis--on daily ppi    Intractable chronic migraine without aura and with status migrainosus    Iron deficiency anemia    B12 deficiency    Anastomotic ulcer    Malignant neoplasm of left kidney ([address] 8/1/18 Dr Roman Moon.  Malignant neoplasm of left kidney (HCC) clear cell. 8/1/18 Dr Roman Moon.     Total knee replacement status, right    Failed total knee, right, initial encounter ([facility] [address]    Infection of total knee replacement ([address]    History of depression    History of alcoholism ([address]    Receiving intravenous antibiotic treatment as outpatient       Past Medical History:   Diagnosis Date    Alcoholism ([address]     last drink 2015    Allergic migraine with status migrainosus     Allergic rhinitis, seasonal     Anemia     Arthritis     right knee    Vaughn's esophagus     Benign intracranial hypertension     Cancer (HCC)     renal     Carpal tunnel syndrome     Chronic pain     Depression     Depression     GERD (gastroesophageal reflux disease)     Headache(784.0)     History of blood transfusion     History of tobacco use     Quit 7/2014    Migraine     chronic for 1 year    Morbid obesity ([address]     Movement disorder     Onychomycosis     Sleep apnea, obstructive     Severe uses cpap stop bang 6    Unspecified cerebral artery occlusion with cerebral infarction 2014    slight weakness in left arm    Wears dentures     full set    Wears glasses        Past Surgical History:   Procedure Laterality Date    ABDOMEN SURGERY      gastric bypass    ABDOMEN SURGERY  4-7-2016    repair of recurrent incisional hernia with mesh, removal of old mesh, bilateral component separation    ABDOMINAL EXPLORATION SURGERY  1/11/16    exp lap, lysis of adhesions, small bowel resection    CARPAL TUNNEL RELEASE      bilat    DILATATION, ESOPHAGUS      ENDOSCOPY, COLON, DIAGNOSTIC      EYE SURGERY      GASTRIC BYPASS SURGERY  Jan 2009    Has lost about 200 pounds.  HERNIA REPAIR  3-    ventral    JOINT REPLACEMENT      KIDNEY REMOVAL Left 08/01/2018    KNEE ARTHROSCOPY Right 7/11/2013    Dr.Robert Sanders     KNEE ARTHROSCOPY Right 7/11/12    RIGHT KNEE ARTHROSCOPY WITH CHONDROPLASTY    KNEE SURGERY  July 2012    right, arthroscopy    KNEE SURGERY Right 2/18/2020    INCISION AND DRAINAGE RIGHT KNEE AND WOULD VAC PLACEMENT performed by Sinan Dash MD at [address]  2005    OTHER SURGICAL HISTORY Left 03/08/2016    CT biopsy ablasion left kidney    OTHER SURGICAL HISTORY  2016    small intestine 12 inch removed, 2 hernia surgeries, another surgery to drain infection from incision.      REVISION TOTAL KNEE ARTHROPLASTY Right 12/17/2019    RIGHT REVISION TIBIA TOTAL KNEE REPLACEMENT performed by Sinan Dash MD at [address] [facility] Right 1/22/2020    RIGHT KNEE IRRIGATION AND DEBRIDEMENT WITH POLY EXCHANGE [facility]  10[facility]    RIGHT    UPPER GASTROINTESTINAL ENDOSCOPY  6-7-2016    UPPER GASTROINTESTINAL ENDOSCOPY  04/02/2018       Family History   Problem Relation Age of Onset    Cancer Father         Lymphoma    Arthritis Mother     Dementia Other     Diabetes Other     Cancer Other     Diabetes Maternal Uncle     Heart Disease Maternal Uncle     Depression Other     Heart Disease Maternal Uncle        Vitals:  Weight BMI   Wt Readings from Last 3 Encounters:   03/09/20 243 lb (110.2 kg)   03/09/20 242 lb (109.8 kg)   03/08/20 242 lb 15.2 oz (110.2 kg)    Body mass index is 32.96 kg/m². BP HR SaO2   BP Readings from Last 3 Encounters:   03/09/20 102/72   03/08/20 129/77   02/28/20 104/70    Pulse Readings from Last 3 Encounters:   03/09/20 62   03/08/20 78   02/28/20 64    SpO2 Readings from Last 3 Encounters:   03/09/20 99%   03/08/20 98%   02/28/20 98%        Assessment/Plan:     Gastroesophageal reflux disease with esophagitis--on daily ppi  Chronic- Stable. Cont meds per PCP and other physicians. Class 1 obesity due to excess calories with body mass index (BMI) of 34.0 to 34.9 in adult  Chronic-Stable. Encouraged him to work on weight loss through diet and exercise. Insomnia-chronic intermittent--uses prn ambien--to be filled by dr Jamar Saha (sleep dr)  Chronic- Stable. Cont meds     Obstructive sleep apnea,Severe -(on cpap)  Reviewed compliance download with pt. Supplies and parts as needed for his machine. These are medically necessary. Continue medications per his PCP and other physicians. Limit caffeine use after 3pm.  Encouraged him to work on weight loss through diet and exercise. Diagnoses of Gastroesophageal reflux disease with esophagitis--on daily ppi and Class 1 obesity due to excess calories without serious comorbidity with body mass index (BMI) of 34.0 to 34.9 in adult were pertinent to this visit. The chronic medical conditions listed are directly related to the primary diagnosis listed above. The management of the primary diagnosis affects the secondary diagnosis and vice versa. The primary encounter diagnosis was Obstructive sleep apnea,Severe -(on cpap).  Diagnoses of Gastroesophageal reflux disease with esophagitis--on daily ppi, Class 1 obesity due to excess calories without serious comorbidity with body mass index (BMI) of 34.0 to 34.9 in adult, and Psychophysiological insomnia were also pertinent to this visit. The chronic medical conditions listed are directly related to the primary diagnosis listed above. The management of the primary diagnosis affects the secondary diagnosis and vice versa. - Educated patient and reviewed compliance download with pt.    -Supplies and parts as needed for his machine, these are medically necessary.    - Patient using [facility] [employer] for supplies  -Continue medications per his PCP and other physicians.   -Limit caffeine use after 3pm.    -Encouraged him to work on weight loss through diet and exercise. - Will see back to monitor medication effectiveness   -F/U: 6 month. No orders of the defined types were placed in this encounter. Orders Placed This Encounter   Medications    zolpidem (AMBIEN) 10 MG tablet     Sig: Take 1 tablet by mouth nightly as needed (1 po hs) for up to 90 days. Dispense:  90 tablet     Refill:  1       No orders of the defined types were placed in this encounter.       Abena Leone, MSN, RN, CNP

## 2020-03-09 NOTE — LETTER
[facility] Sleep Medicine  [address] [facility] [address]  Phone: [phone]  Fax: [phone]    March 9, 2020       Patient: Zack Linn   MR Number: [id number]   YOB: 1970   Date of Visit: 3/9/2020       Elsi Almanza was seen for a follow up visit today. Here is my assessment and plan as well as an attached copy of his visit today:    Gastroesophageal reflux disease with esophagitis--on daily ppi  Chronic- Stable. Cont meds per PCP and other physicians. Class 1 obesity due to excess calories with body mass index (BMI) of 34.0 to 34.9 in adult  Chronic-Stable. Encouraged him to work on weight loss through diet and exercise. Insomnia-chronic intermittent--uses prn ambien--to be filled by dr Romi Griffiths (sleep dr)  Chronic- Stable. Cont meds     Obstructive sleep apnea,Severe -(on cpap)  Reviewed compliance download with pt. Supplies and parts as needed for his machine. These are medically necessary. Continue medications per his PCP and other physicians. Limit caffeine use after 3pm.  Encouraged him to work on weight loss through diet and exercise. Diagnoses of Gastroesophageal reflux disease with esophagitis--on daily ppi and Class 1 obesity due to excess calories without serious comorbidity with body mass index (BMI) of 34.0 to 34.9 in adult were pertinent to this visit. The chronic medical conditions listed are directly related to the primary diagnosis listed above. The management of the primary diagnosis affects the secondary diagnosis and vice versa. If you have questions or concerns, please do not hesitate to call me. I look forward to following Daniel Smith along with you.     Sincerely,    LILLIE Luque - CNP    CC providers:  Mary Beth Wilson MD  [address] [place] [address]

## 2020-03-09 NOTE — ED PROVIDER NOTES
own.      FINAL IMPRESSION      1.  Fatigue, unspecified type          DISPOSITION/PLAN    DISPOSITION Decision To Discharge 03/08/2020 10:54:39 PM      PATIENT REFERRED TO:  Kei Quiñones MD  [address] [place] [address]  [address] [place] [address]  [phone]            DISCHARGE MEDICATIONS:  Current Discharge Medication List          DISCONTINUED MEDICATIONS:  Current Discharge Medication List                 (Please note that portions of this note were completed with a voice recognition program.  Efforts were made to editthe dictations but occasionally words are mis-transcribed.)    Gin Acevedo MD (electronically signed)            Gin Acevedo MD  03/08/20 0626

## 2020-03-10 ENCOUNTER — TELEPHONE (OUTPATIENT)
Dept: INFECTIOUS DISEASES | Age: 50
End: 2020-03-10

## 2020-03-11 ENCOUNTER — PATIENT MESSAGE (OUTPATIENT)
Dept: PSYCHIATRY | Age: 50
End: 2020-03-11

## 2020-03-11 RX ORDER — TRAZODONE HYDROCHLORIDE 100 MG/1
200 TABLET ORAL NIGHTLY
Qty: 60 TABLET | Refills: 0 | Status: ON HOLD | OUTPATIENT
Start: 2020-03-11 | End: 2020-04-07 | Stop reason: SDUPTHER

## 2020-03-11 RX ORDER — OXYCODONE HYDROCHLORIDE 5 MG/1
5 TABLET ORAL EVERY 6 HOURS PRN
Qty: 28 TABLET | Refills: 0 | Status: SHIPPED | OUTPATIENT
Start: 2020-03-11 | End: 2020-03-17 | Stop reason: SDUPTHER

## 2020-03-12 NOTE — TELEPHONE ENCOUNTER
From: Hilton Anthony  To: Renee Ruiz MD  Sent: 3/11/2020 10:19 AM EDT  Subject: Prescription Question    Hi Dr. Moseley Erm,    Have you had a chance to look at the refill of 100mg Trazodone? My current fill is down to the last two tablets and I'm out of refills on that prescription.      Alva Oro

## 2020-03-17 ENCOUNTER — TELEPHONE (OUTPATIENT)
Dept: INFECTIOUS DISEASES | Age: 50
End: 2020-03-17

## 2020-03-17 RX ORDER — OXYCODONE HYDROCHLORIDE 5 MG/1
5 TABLET ORAL EVERY 6 HOURS PRN
Qty: 28 TABLET | Refills: 0 | Status: SHIPPED | OUTPATIENT
Start: 2020-03-17 | End: 2020-03-21 | Stop reason: SDUPTHER

## 2020-03-17 NOTE — TELEPHONE ENCOUNTER
Patient would like a call from Dr. Stella Aguillon to discuss his care.   Appointment cancelled on 3/18/2020  Patient can be reached at [phone]

## 2020-03-17 NOTE — TELEPHONE ENCOUNTER
Spoke to pt he will resume Doxycycline one a day and increase to twice a day as tolerated and space Milk products by 2 hrs apart

## 2020-03-19 ENCOUNTER — TELEPHONE (OUTPATIENT)
Dept: FAMILY MEDICINE CLINIC | Age: 50
End: 2020-03-19

## 2020-03-20 NOTE — TELEPHONE ENCOUNTER
Please notify Kanchan Sy that all those with upper respiratory symptoms or even mild signs of sickness during this time of coronavirus outbreak concerns are asked to avoid contact with the community and as few family members as possible. Only those with difficulty breathing or caring for themselves due to the severity of their illness should go to the ER. Testing centers are set up throughout the city (at a multitude of locations) for influenza and strep testing (not doing COVID19 testing at this time). Call the [employer] Nurse [employer] [phone] for information about the mobile testing centers.

## 2020-03-21 ENCOUNTER — NURSE TRIAGE (OUTPATIENT)
Dept: OTHER | Facility: CLINIC | Age: 50
End: 2020-03-21

## 2020-03-21 ENCOUNTER — APPOINTMENT (OUTPATIENT)
Dept: CT IMAGING | Age: 50
End: 2020-03-21
Payer: COMMERCIAL

## 2020-03-21 ENCOUNTER — HOSPITAL ENCOUNTER (EMERGENCY)
Age: 50
Discharge: HOME | End: 2020-03-21
Attending: EMERGENCY MEDICINE
Payer: COMMERCIAL

## 2020-03-21 ENCOUNTER — APPOINTMENT (OUTPATIENT)
Dept: GENERAL RADIOLOGY | Age: 50
End: 2020-03-21
Payer: COMMERCIAL

## 2020-03-21 VITALS
TEMPERATURE: 98.3 F | HEART RATE: 64 BPM | DIASTOLIC BLOOD PRESSURE: 77 MMHG | WEIGHT: 255.07 LBS | OXYGEN SATURATION: 92 % | SYSTOLIC BLOOD PRESSURE: 123 MMHG | HEIGHT: 72 IN | RESPIRATION RATE: 18 BRPM | BODY MASS INDEX: 34.55 KG/M2

## 2020-03-21 LAB
ANION GAP SERPL CALCULATED.3IONS-SCNC: 12 MMOL/L (ref 3–16)
BUN BLDV-MCNC: 10 MG/DL (ref 7–20)
CALCIUM SERPL-MCNC: 9.1 MG/DL (ref 8.3–10.6)
CHLORIDE BLD-SCNC: 106 MMOL/L (ref 99–110)
CO2: 25 MMOL/L (ref 21–32)
CREAT SERPL-MCNC: 0.9 MG/DL (ref 0.9–1.3)
GFR AFRICAN AMERICAN: >60
GFR NON-AFRICAN AMERICAN: >60
GLUCOSE BLD-MCNC: 103 MG/DL (ref 70–99)
POTASSIUM SERPL-SCNC: 4.8 MMOL/L (ref 3.5–5.1)
RAPID INFLUENZA  B AGN: NEGATIVE
RAPID INFLUENZA A AGN: NEGATIVE
SODIUM BLD-SCNC: 143 MMOL/L (ref 136–145)

## 2020-03-21 PROCEDURE — 87804 INFLUENZA ASSAY W/OPTIC: CPT

## 2020-03-21 PROCEDURE — 87040 BLOOD CULTURE FOR BACTERIA: CPT

## 2020-03-21 PROCEDURE — 36415 COLL VENOUS BLD VENIPUNCTURE: CPT

## 2020-03-21 PROCEDURE — 6370000000 HC RX 637 (ALT 250 FOR IP): Performed by: EMERGENCY MEDICINE

## 2020-03-21 PROCEDURE — 6360000002 HC RX W HCPCS: Performed by: EMERGENCY MEDICINE

## 2020-03-21 PROCEDURE — 80048 BASIC METABOLIC PNL TOTAL CA: CPT

## 2020-03-21 PROCEDURE — 6360000004 HC RX CONTRAST MEDICATION: Performed by: EMERGENCY MEDICINE

## 2020-03-21 PROCEDURE — 71260 CT THORAX DX C+: CPT

## 2020-03-21 PROCEDURE — 96374 THER/PROPH/DIAG INJ IV PUSH: CPT

## 2020-03-21 PROCEDURE — 99285 EMERGENCY DEPT VISIT HI MDM: CPT

## 2020-03-21 PROCEDURE — 96375 TX/PRO/DX INJ NEW DRUG ADDON: CPT

## 2020-03-21 RX ORDER — ONDANSETRON 2 MG/ML
4 INJECTION INTRAMUSCULAR; INTRAVENOUS ONCE
Status: COMPLETED | OUTPATIENT
Start: 2020-03-21 | End: 2020-03-21

## 2020-03-21 RX ORDER — OXYCODONE HYDROCHLORIDE AND ACETAMINOPHEN 5; 325 MG/1; MG/1
1 TABLET ORAL ONCE
Status: COMPLETED | OUTPATIENT
Start: 2020-03-21 | End: 2020-03-21

## 2020-03-21 RX ORDER — ONDANSETRON 4 MG/1
4 TABLET, FILM COATED ORAL ONCE
Status: COMPLETED | OUTPATIENT
Start: 2020-03-21 | End: 2020-03-21

## 2020-03-21 RX ADMIN — HYDROMORPHONE HYDROCHLORIDE 1 MG: 1 INJECTION, SOLUTION INTRAMUSCULAR; INTRAVENOUS; SUBCUTANEOUS at 12:45

## 2020-03-21 RX ADMIN — IOPAMIDOL 100 ML: 755 INJECTION, SOLUTION INTRAVENOUS at 11:48

## 2020-03-21 RX ADMIN — OXYCODONE HYDROCHLORIDE AND ACETAMINOPHEN 1 TABLET: 5; 325 TABLET ORAL at 10:58

## 2020-03-21 RX ADMIN — ONDANSETRON HYDROCHLORIDE 4 MG: 4 TABLET, FILM COATED ORAL at 10:58

## 2020-03-21 RX ADMIN — ONDANSETRON 4 MG: 2 INJECTION INTRAMUSCULAR; INTRAVENOUS at 12:45

## 2020-03-21 ASSESSMENT — PAIN DESCRIPTION - ORIENTATION: ORIENTATION: RIGHT

## 2020-03-21 ASSESSMENT — PAIN DESCRIPTION - DESCRIPTORS: DESCRIPTORS: ACHING;STABBING;SHARP

## 2020-03-21 ASSESSMENT — PAIN SCALES - GENERAL
PAINLEVEL_OUTOF10: 8
PAINLEVEL_OUTOF10: 4
PAINLEVEL_OUTOF10: 8
PAINLEVEL_OUTOF10: 4

## 2020-03-21 ASSESSMENT — PAIN DESCRIPTION - ONSET: ONSET: SUDDEN

## 2020-03-21 ASSESSMENT — PAIN DESCRIPTION - LOCATION: LOCATION: KNEE

## 2020-03-21 ASSESSMENT — PAIN DESCRIPTION - PROGRESSION: CLINICAL_PROGRESSION: GRADUALLY WORSENING

## 2020-03-21 ASSESSMENT — PAIN DESCRIPTION - FREQUENCY: FREQUENCY: CONTINUOUS

## 2020-03-21 ASSESSMENT — PAIN - FUNCTIONAL ASSESSMENT: PAIN_FUNCTIONAL_ASSESSMENT: 0-10

## 2020-03-21 ASSESSMENT — PAIN DESCRIPTION - PAIN TYPE: TYPE: ACUTE PAIN

## 2020-03-21 NOTE — ED TRIAGE NOTES
pt who is s/p splenectomy and has wound vac in place (since February) presents to ED with acute onset of fatigue since Wednesday and began with sob on yesterday/

## 2020-03-21 NOTE — ED PROVIDER NOTES
Emergency Department Encounter    Patient: Koko Enrique  MRN: [id number]  : 1970  Date of Evaluation: 2020  ED Provider:  Analy Aparicio    Triage Chief Complaint:   Shortness of Breath (pt who is s/p splenectomy and has wound vac in place (since February) presents to ED with acute onset of fatigue since Wednesday and began with sob on yesterday/) and Generalized Body Aches    [place]:  Koko Enrique is a 51 y.o. male that presents ER for evaluation of acute myalgias, subjective fevers, he status post multiple debridements and operation has a wound VAC in place of his right knee, he is currently off antibiotics which is last 1 being doxycycline. He has had a prior history of splenectomy. He is currently afebrile, positive dyspnea, positive myalgias. No current fever. Persistent right knee pain no sudden onset of symptoms a right knee pain. No known history of DVT or PE.     ROS - see HPI, below listed is current ROS at time of my eval:  General:  + fevers, + chills  Eyes:  No recent vison changes, no discharge  ENT:  No sore throat, no nasal congestion, no hearing changes  Cardiovascular:  No chest pain, no palpitations  Respiratory:  + shortness of breath, no cough, + wheezing  Gastrointestinal:  No pain, no nausea, no vomiting, no diarrhea  Musculoskeletal:  No muscle pain, no joint pain  Skin:  No rash, no pruritis  Neurologic:  No speech problems, no headache  Psychiatric:  No anxiety  Genitourinary:  No dysuria, no hematuria  Endocrine:  No unexpected weight gain, no unexpected weight loss  Extremities:  no edema, no pain      Past Medical History:   Diagnosis Date    Alcoholism ([facility] [address]     last drink     Allergic migraine with status migrainosus     Allergic rhinitis, seasonal     Anemia     Arthritis     right knee    Vaughn's esophagus     Benign intracranial hypertension     Cancer (HCC)     renal     Carpal tunnel syndrome     Chronic pain     Depression     Depression     GERD (gastroesophageal reflux disease)     Headache(784.0)     History of blood transfusion     History of tobacco use     Quit 7/2014    Migraine     chronic for 1 year    Morbid obesity ([address]     Movement disorder     Onychomycosis     Sleep apnea, obstructive     Severe uses cpap stop bang 6    Unspecified cerebral artery occlusion with cerebral infarction 2014    slight weakness in left arm    Wears dentures     full set    Wears glasses      Past Surgical History:   Procedure Laterality Date    ABDOMEN SURGERY      gastric bypass    ABDOMEN SURGERY  4-7-2016    repair of recurrent incisional hernia with mesh, removal of old mesh, bilateral component separation    ABDOMINAL EXPLORATION SURGERY  1/11/16    exp lap, lysis of adhesions, small bowel resection    CARPAL TUNNEL RELEASE      bilat    DILATATION, ESOPHAGUS      ENDOSCOPY, COLON, DIAGNOSTIC      EYE SURGERY      GASTRIC BYPASS SURGERY  Jan 2009    Has lost about 200 pounds.  HERNIA REPAIR  3-    ventral    JOINT REPLACEMENT      KIDNEY REMOVAL Left 08/01/2018    KNEE ARTHROSCOPY Right 7/11/2013    Dr.Robert Sanders     KNEE ARTHROSCOPY Right 7/11/12    RIGHT KNEE ARTHROSCOPY WITH CHONDROPLASTY    KNEE SURGERY  July 2012    right, arthroscopy    KNEE SURGERY Right 2/18/2020    INCISION AND DRAINAGE RIGHT KNEE AND WOULD VAC PLACEMENT performed by Todd Go MD at [address]  2005    OTHER SURGICAL HISTORY Left 03/08/2016    CT biopsy ablasion left kidney    OTHER SURGICAL HISTORY  2016    small intestine 12 inch removed, 2 hernia surgeries, another surgery to drain infection from incision.      REVISION TOTAL KNEE ARTHROPLASTY Right 12/17/2019    RIGHT REVISION TIBIA TOTAL KNEE REPLACEMENT performed by Todd Go MD at [facility] Right 1/22/2020    RIGHT KNEE IRRIGATION AND DEBRIDEMENT WITH POLY EXCHANGE performed by Taya Camacho Not on file     Forced sexual activity: Not on file   Other Topics Concern    Not on file   Social History Narrative    Not on file     No current facility-administered medications for this encounter. Current Outpatient Medications   Medication Sig Dispense Refill    traZODone (DESYREL) 100 MG tablet Take 2 tablets by mouth nightly 60 tablet 0    zolpidem (AMBIEN) 10 MG tablet Take 1 tablet by mouth nightly as needed (1 po hs) for up to 90 days. 90 tablet 1    doxycycline hyclate (VIBRA-TABS) 100 MG tablet Take 1 tablet by mouth 2 times daily 60 tablet 1    FLUoxetine (PROZAC) 20 MG capsule TAKE 1 CAPSULE BY MOUTH DAILY 90 capsule 0    oxyCODONE (ROXICODONE) 5 MG immediate release tablet Take 5 mg by mouth every 4 hours as needed for Pain.  pantoprazole (PROTONIX) 40 MG tablet Take 1 tablet by mouth 2 times daily      dicyclomine (BENTYL) 20 MG tablet Take 40 mg by mouth 2 times daily as needed  0    atorvastatin (LIPITOR) 40 MG tablet TAKE 1 TABLET BY MOUTH EVERY NIGHT AT BEDTIME 90 tablet 1    calcium-vitamin D (OSCAL 500/200 D-3) 500-200 MG-UNIT per tablet Take 1 tablet by mouth 2 times daily       Multiple Vitamin TABS Take 1 tablet by mouth daily       cyclobenzaprine (FLEXERIL) 10 MG tablet Take 1 tablet by mouth nightly as needed for Muscle spasms 30 tablet 0    oxyCODONE (ROXICODONE) 5 MG immediate release tablet Take 1 tablet by mouth every 6 hours as needed for Pain for up to 7 days. 28 tablet 0    acetaminophen (TYLENOL) 500 MG tablet Take 2 tablets by mouth 3 times daily (with meals) 42 tablet 0    aspirin EC 81 MG EC tablet Take 1 tablet by mouth 2 times daily for 14 days Please avoid missing doses.  28 tablet 0    sildenafil (REVATIO) 20 MG tablet Take 4 tablets by mouth as needed (30 min prior to intercourse) 30 tablet 5     Allergies   Allergen Reactions    Nsaids Nausea Only and Other (See Comments)     Hx of Barretts esophagus      Prochlorperazine Other (See Comments) No allergic reaction, patient reported sense of \"restlessness\" and fidgiting    Valtrex [Valacyclovir Hcl] Diarrhea    Morphine Rash    Morphine And Related Itching    Tolmetin Nausea Only     Hx of Barretts esophagus       Nursing Notes Reviewed    Physical Exam:  Triage VS:    ED Triage Vitals [03/21/20 1046]   Enc Vitals Group      /74      Pulse 64      Resp 18      Temp 98.3 °F (36.8 °C)      Temp Source Oral      SpO2 100 %      Weight 255 lb 1.2 oz (115.7 kg)      Height 6' (1.829 m)      Head Circumference       Peak Flow       Pain Score       Pain Loc       Pain Edu? Excl. in [address]? My pulse ox interpretation is - normal    General appearance:  No acute distress  Skin:  Warm. Dry. No pallor. No rash. Eye:  Normal conjuctiva. no Icterus. Ears, nose, mouth and throat:  Oral mucosa moist   Heart:  Regular rate and rhythm, normal S1 & S2, no extra heart sounds, no murmurs. Perfusion:  intact  Respiratory:  inspiratory and expiratory wheezes in all lung fields, decreased air entry throughout, mild tachypnea noted, no accessory muscle use  Abdominal:  Soft. Nontender. Non distended. Extremity:  No edema or tenderness, intact wound VAC, right leg  Neurological:  Alert and oriented,  No focal neuro deficits. I have reviewed and interpreted all of the currently available lab results from this visit (if applicable):  Results for orders placed or performed during the hospital encounter of 03/21/20   Rapid influenza A/B antigens   Result Value Ref Range    Rapid Influenza A Ag Negative Negative    Rapid Influenza B Ag Negative Negative   Culture, Blood 1   Result Value Ref Range    Blood Culture, Routine No Growth after 4 days of incubation. Culture, Blood 2   Result Value Ref Range    Culture, Blood 2 No Growth after 4 days of incubation.     Basic Metabolic Panel   Result Value Ref Range    Sodium 143 136 - 145 mmol/L    Potassium 4.8 3.5 - 5.1 mmol/L    Chloride 106 99 grammar, punctuation, and spelling, as well as words and phrases that may be inappropriate. Efforts were made to edit the dictations.      Ewelina Cho MD  73/30/16 3196

## 2020-03-21 NOTE — ED NOTES
Pt straight stuck per right hand x 1 for 2nd set of BC by this RN/pt tolerated well/     Trent Lord RN  03/21/20 9109

## 2020-03-21 NOTE — ED NOTES
Discharge and education instructions reviewed. Patient verbalized understanding, teach-back successful. Patient denied questions at this time. No acute distress noted. Patient instructed to follow-up as noted - return to emergency department if symptoms worsen. Patient verbalized understanding. Discharged per EDMD with discharge instructions.          Panda Lockwood RN  03/21/20 5132

## 2020-03-23 ENCOUNTER — CARE COORDINATION (OUTPATIENT)
Dept: CASE MANAGEMENT | Age: 50
End: 2020-03-23

## 2020-03-23 ENCOUNTER — HOSPITAL ENCOUNTER (OUTPATIENT)
Dept: VASCULAR LAB | Age: 50
Discharge: HOME | End: 2020-03-23
Payer: COMMERCIAL

## 2020-03-23 PROCEDURE — 93971 EXTREMITY STUDY: CPT

## 2020-03-23 RX ORDER — OXYCODONE HYDROCHLORIDE 5 MG/1
5 TABLET ORAL EVERY 6 HOURS PRN
Qty: 28 TABLET | Refills: 0 | Status: SHIPPED | OUTPATIENT
Start: 2020-03-23 | End: 2020-03-31 | Stop reason: SDUPTHER

## 2020-03-23 NOTE — CARE COORDINATION
COVID-19 Screening Initial Follow-up Note    Patient contacted regarding COVID-19  risk. Ambulatory Care Manager contacted the patient by telephone to perform post discharge assessment. Verified name and  with patient as identifiers. Provided introduction to self, and explanation of the CTN/ACM role, and reason for call due to risk factors for infection and/or exposure to COVID-19. Symptoms reviewed with patient who verbalized the following symptoms: fatigue, pain or aching joints, cough, shortness of breath, fast breathing, \"dizziness/lightheadedness and no new/worsening symptoms  --Patient reports these symptoms are not new and symptoms are improving      Due to no new or worsening symptoms encounter was not routed to provider for escalation. Patient has following risk factors of: immunocompromised. ACM reviewed discharge instructions, medical action plan and red flags such as increased shortness of breath, increasing fever and signs of decompensation with patient who verbalized understanding. Discussed exposure protocols and quarantine with CDC Guidelines What to do if you are sick with coronavirus disease 2019 Patient who was given an opportunity for questions and concerns. The patient agrees to contact the [facility] exposure line [phone], local [facility] department [place] Department of Health: ([phone]) and PCP office for questions related to their healthcare. ACM provided contact information for future reference.     Reviewed and educated patient on any new and changed medications related to discharge diagnosis     Plan for follow-up call in 14 days based on severity risk factors

## 2020-03-25 ENCOUNTER — HOSPITAL ENCOUNTER (EMERGENCY)
Age: 50
Discharge: HOME | End: 2020-03-25
Attending: EMERGENCY MEDICINE
Payer: COMMERCIAL

## 2020-03-25 ENCOUNTER — TELEPHONE (OUTPATIENT)
Dept: ORTHOPEDIC SURGERY | Age: 50
End: 2020-03-25

## 2020-03-25 ENCOUNTER — APPOINTMENT (OUTPATIENT)
Dept: GENERAL RADIOLOGY | Age: 50
End: 2020-03-25
Payer: COMMERCIAL

## 2020-03-25 VITALS
OXYGEN SATURATION: 97 % | DIASTOLIC BLOOD PRESSURE: 77 MMHG | BODY MASS INDEX: 32.91 KG/M2 | HEART RATE: 75 BPM | HEIGHT: 72 IN | RESPIRATION RATE: 16 BRPM | TEMPERATURE: 97.8 F | SYSTOLIC BLOOD PRESSURE: 120 MMHG | WEIGHT: 242.95 LBS

## 2020-03-25 PROBLEM — C64.2 MALIGNANT NEOPLASM OF LEFT KIDNEY (HCC): Status: RESOLVED | Noted: 2018-10-15 | Resolved: 2020-03-24

## 2020-03-25 LAB
ANION GAP SERPL CALCULATED.3IONS-SCNC: 9 MMOL/L (ref 3–16)
BASOPHILS ABSOLUTE: 0.1 K/UL (ref 0–0.2)
BASOPHILS RELATIVE PERCENT: 1.5 %
BLOOD CULTURE, ROUTINE: NORMAL
BUN BLDV-MCNC: 12 MG/DL (ref 7–20)
C-REACTIVE PROTEIN: 5.6 MG/L (ref 0–5.1)
CALCIUM SERPL-MCNC: 9.1 MG/DL (ref 8.3–10.6)
CHLORIDE BLD-SCNC: 100 MMOL/L (ref 99–110)
CO2: 27 MMOL/L (ref 21–32)
CREAT SERPL-MCNC: 0.9 MG/DL (ref 0.9–1.3)
CULTURE, BLOOD 2: NORMAL
EOSINOPHILS ABSOLUTE: 0.3 K/UL (ref 0–0.6)
EOSINOPHILS RELATIVE PERCENT: 4.9 %
GFR AFRICAN AMERICAN: >60
GFR NON-AFRICAN AMERICAN: >60
GLUCOSE BLD-MCNC: 112 MG/DL (ref 70–99)
HCT VFR BLD CALC: 40.1 % (ref 40.5–52.5)
HEMOGLOBIN: 13 G/DL (ref 13.5–17.5)
LYMPHOCYTES ABSOLUTE: 2.1 K/UL (ref 1–5.1)
LYMPHOCYTES RELATIVE PERCENT: 35.3 %
MCH RBC QN AUTO: 28.3 PG (ref 26–34)
MCHC RBC AUTO-ENTMCNC: 32.4 G/DL (ref 31–36)
MCV RBC AUTO: 87.5 FL (ref 80–100)
MONOCYTES ABSOLUTE: 0.5 K/UL (ref 0–1.3)
MONOCYTES RELATIVE PERCENT: 8.5 %
NEUTROPHILS ABSOLUTE: 3 K/UL (ref 1.7–7.7)
NEUTROPHILS RELATIVE PERCENT: 49.8 %
PDW BLD-RTO: 14.4 % (ref 12.4–15.4)
PLATELET # BLD: 345 K/UL (ref 135–450)
PMV BLD AUTO: 8.4 FL (ref 5–10.5)
POTASSIUM SERPL-SCNC: 4.5 MMOL/L (ref 3.5–5.1)
RBC # BLD: 4.59 M/UL (ref 4.2–5.9)
SEDIMENTATION RATE, ERYTHROCYTE: 4 MM/HR (ref 0–15)
SODIUM BLD-SCNC: 136 MMOL/L (ref 136–145)
WBC # BLD: 6 K/UL (ref 4–11)

## 2020-03-25 PROCEDURE — 80048 BASIC METABOLIC PNL TOTAL CA: CPT

## 2020-03-25 PROCEDURE — 86140 C-REACTIVE PROTEIN: CPT

## 2020-03-25 PROCEDURE — 6370000000 HC RX 637 (ALT 250 FOR IP): Performed by: PHYSICIAN ASSISTANT

## 2020-03-25 PROCEDURE — 96374 THER/PROPH/DIAG INJ IV PUSH: CPT

## 2020-03-25 PROCEDURE — 87040 BLOOD CULTURE FOR BACTERIA: CPT

## 2020-03-25 PROCEDURE — 99283 EMERGENCY DEPT VISIT LOW MDM: CPT

## 2020-03-25 PROCEDURE — 73560 X-RAY EXAM OF KNEE 1 OR 2: CPT

## 2020-03-25 PROCEDURE — 85025 COMPLETE CBC W/AUTO DIFF WBC: CPT

## 2020-03-25 PROCEDURE — 6360000002 HC RX W HCPCS: Performed by: PHYSICIAN ASSISTANT

## 2020-03-25 PROCEDURE — 96376 TX/PRO/DX INJ SAME DRUG ADON: CPT

## 2020-03-25 PROCEDURE — 6360000002 HC RX W HCPCS: Performed by: EMERGENCY MEDICINE

## 2020-03-25 PROCEDURE — 85652 RBC SED RATE AUTOMATED: CPT

## 2020-03-25 RX ORDER — OXYCODONE HYDROCHLORIDE 5 MG/1
5 TABLET ORAL ONCE
Status: COMPLETED | OUTPATIENT
Start: 2020-03-25 | End: 2020-03-25

## 2020-03-25 RX ORDER — ACETAMINOPHEN 500 MG
1000 TABLET ORAL
Qty: 42 TABLET | Refills: 0 | Status: ON HOLD | OUTPATIENT
Start: 2020-03-25 | End: 2021-02-17

## 2020-03-25 RX ADMIN — OXYCODONE 5 MG: 5 TABLET ORAL at 16:34

## 2020-03-25 RX ADMIN — HYDROMORPHONE HYDROCHLORIDE 1 MG: 1 INJECTION, SOLUTION INTRAMUSCULAR; INTRAVENOUS; SUBCUTANEOUS at 18:12

## 2020-03-25 RX ADMIN — HYDROMORPHONE HYDROCHLORIDE 0.5 MG: 1 INJECTION, SOLUTION INTRAMUSCULAR; INTRAVENOUS; SUBCUTANEOUS at 19:56

## 2020-03-25 ASSESSMENT — PAIN DESCRIPTION - ORIENTATION: ORIENTATION: RIGHT

## 2020-03-25 ASSESSMENT — PAIN SCALES - GENERAL
PAINLEVEL_OUTOF10: 7
PAINLEVEL_OUTOF10: 5
PAINLEVEL_OUTOF10: 5
PAINLEVEL_OUTOF10: 8
PAINLEVEL_OUTOF10: 7
PAINLEVEL_OUTOF10: 9
PAINLEVEL_OUTOF10: 8

## 2020-03-25 ASSESSMENT — PAIN DESCRIPTION - DESCRIPTORS: DESCRIPTORS: ACHING

## 2020-03-25 ASSESSMENT — PAIN - FUNCTIONAL ASSESSMENT: PAIN_FUNCTIONAL_ASSESSMENT: PREVENTS OR INTERFERES SOME ACTIVE ACTIVITIES AND ADLS

## 2020-03-25 ASSESSMENT — PAIN DESCRIPTION - PAIN TYPE: TYPE: ACUTE PAIN

## 2020-03-25 ASSESSMENT — PAIN DESCRIPTION - FREQUENCY: FREQUENCY: CONTINUOUS

## 2020-03-25 ASSESSMENT — PAIN DESCRIPTION - LOCATION: LOCATION: KNEE

## 2020-03-25 ASSESSMENT — PAIN DESCRIPTION - PROGRESSION: CLINICAL_PROGRESSION: NOT CHANGED

## 2020-03-25 ASSESSMENT — PAIN DESCRIPTION - ONSET: ONSET: ON-GOING

## 2020-03-25 NOTE — ED PROVIDER NOTES
[address] [facility]        Pt Name: Robyn Romano  MRN: [id number]  [place] 1970  Date of evaluation: 3/25/2020  Provider: James Connelly PA-C  PCP: Leticia Ham MD     I have seen and evaluated this patient with my supervising physician Christine Olmos, [address] [place] [address]       Chief Complaint   Patient presents with    Knee Pain     irght knee pain. pt with history of knee reapir. wound vac in place        HISTORY OF PRESENT ILLNESS   (Location, Timing/Onset, Context/Setting, Quality, Duration, Modifying Factors, Severity, Associated Signs and Symptoms)  Note limiting factors. Robyn Romano is a 51 y.o. male patient presenting with progressive knee pain over the past 48 hours with associated instability. Patient reports he had a right total knee replacement 6 or 7 years ago by Dr. Beena Garcia at [facility]. The patient had done well up until June, 2019 at which time he began experience pain and instability in the knee. He was seen by Dr. Adriana Le and underwent physical therapy and bracing. By December 17, 2019 the patient had continued problem and it was advised to go to the OR to undergo open evaluation and ultimately replaced a poly-spacer between the hardware. Hardware was not replaced. Patient did reasonably well however in the course of his healing phase he sustained swelling and ultimately had a split of the superior aspect of the wound. This was treated conservatively. Patient had continued problem and on approximately January 22, 2020 the patient had a second procedure at which time the poly-spacer was removed the area irrigated and a new spacer placed. Patient had continued difficulties with incision. He states it was early February that he underwent a third procedure to open, debride the superficial aspect of the wound with wound VAC placement at that time.   Patient states he has been doing well since his third procedure. States 48 hours ago he had progressive pain worse with weightbearing as well as flexion. He is currently on Roxicodone 5 mg. Last taken a few hours ago. This is not controlling his pain. Patient had joint culture obtained January 22, 2020 growing corynebacterium jeikeium. Patient been followed by Dr. Adriana Le, Dr. Korina Stanton and Dr. Marlys Arroyo. He had been on vancomycin IV. At this time he has no fevers, chills, chest pain, shortness of breath. Is presented because of increasing pain and instability of the knee. Instability may be related to the pain upon standing. He is using a cane at this time. Nursing Notes were all reviewed and agreed with or any disagreements were addressed in the HPI. REVIEW OF SYSTEMS    (2-9 systems for level 4, 10 or more for level 5)     Review of Systems    Positives and Pertinent negatives as per HPI. Except as noted above in the ROS, all other systems were reviewed and negative.        PAST MEDICAL HISTORY     Past Medical History:   Diagnosis Date    Alcoholism [facility])     last drink 2015    Allergic migraine with status migrainosus     Allergic rhinitis, seasonal     Anemia     Arthritis     right knee    Vaughn's esophagus     Benign intracranial hypertension     Cancer (HCC)     renal     Carpal tunnel syndrome     Chronic pain     Depression     Depression     GERD (gastroesophageal reflux disease)     Headache(784.0)     History of blood transfusion     History of tobacco use     Quit 7/2014    Migraine     chronic for 1 year    Morbid obesity ([address]     Movement disorder     Onychomycosis     Sleep apnea, obstructive     Severe uses cpap stop bang 6    Unspecified cerebral artery occlusion with cerebral infarction 2014    slight weakness in left arm    Wears dentures     full set    Wears glasses          SURGICAL HISTORY     Past Surgical History:   Procedure Laterality Date    ABDOMEN SURGERY      gastric bypass    ABDOMEN SURGERY  4-7-2016    repair of recurrent incisional hernia with mesh, removal of old mesh, bilateral component separation    ABDOMINAL EXPLORATION SURGERY  1/11/16    exp lap, lysis of adhesions, small bowel resection    CARPAL TUNNEL RELEASE      bilat    DILATATION, ESOPHAGUS      ENDOSCOPY, COLON, DIAGNOSTIC      EYE SURGERY      GASTRIC BYPASS SURGERY  Jan 2009    Has lost about 200 pounds.  HERNIA REPAIR  3-    ventral    JOINT REPLACEMENT      KIDNEY REMOVAL Left 08/01/2018    KNEE ARTHROSCOPY Right 7/11/2013    Dr.Robert Sanders     KNEE ARTHROSCOPY Right 7/11/12    RIGHT KNEE ARTHROSCOPY WITH CHONDROPLASTY    KNEE SURGERY  July 2012    right, arthroscopy    KNEE SURGERY Right 2/18/2020    INCISION AND DRAINAGE RIGHT KNEE AND WOULD VAC PLACEMENT performed by Bethanie Chandler MD at [address]  2005    OTHER SURGICAL HISTORY Left 03/08/2016    CT biopsy ablasion left kidney    OTHER SURGICAL HISTORY  2016    small intestine 12 inch removed, 2 hernia surgeries, another surgery to drain infection from incision.  REVISION TOTAL KNEE ARTHROPLASTY Right 12/17/2019    RIGHT REVISION TIBIA TOTAL KNEE REPLACEMENT performed by Bethanie Chandler MD at [place] [address] Right 1/22/2020    RIGHT KNEE IRRIGATION AND DEBRIDEMENT WITH POLY EXCHANGE performed by Seng Gutierrez MD at [address] [facility]  10/15/13    RIGHT    UPPER GASTROINTESTINAL ENDOSCOPY  6-7-2016    UPPER GASTROINTESTINAL ENDOSCOPY  04/02/2018         CURRENTMEDICATIONS       Previous Medications    ASPIRIN EC 81 MG EC TABLET    Take 1 tablet by mouth 2 times daily for 14 days Please avoid missing doses.     ATORVASTATIN (LIPITOR) 40 MG TABLET    TAKE 1 TABLET BY MOUTH EVERY NIGHT AT BEDTIME    CALCIUM-VITAMIN D (OSCAL 500/200 D-3) 500-200 MG-UNIT PER TABLET    Take 1 tablet by mouth 2 times daily     DICYCLOMINE (BENTYL) 20 MG TABLET    Take 40 mg by mouth 2 times daily as needed    DOXYCYCLINE HYCLATE (VIBRA-TABS) 100 MG TABLET    Take 1 tablet by mouth 2 times daily    FLUOXETINE (PROZAC) 20 MG CAPSULE    TAKE 1 CAPSULE BY MOUTH DAILY    MULTIPLE VITAMIN TABS    Take 1 tablet by mouth daily     OXYCODONE (ROXICODONE) 5 MG IMMEDIATE RELEASE TABLET    Take 5 mg by mouth every 4 hours as needed for Pain. OXYCODONE (ROXICODONE) 5 MG IMMEDIATE RELEASE TABLET    Take 1 tablet by mouth every 6 hours as needed for Pain for up to 7 days. PANTOPRAZOLE (PROTONIX) 40 MG TABLET    Take 1 tablet by mouth 2 times daily    SILDENAFIL (REVATIO) 20 MG TABLET    Take 4 tablets by mouth as needed (30 min prior to intercourse)    TRAZODONE (DESYREL) 100 MG TABLET    Take 2 tablets by mouth nightly    ZOLPIDEM (AMBIEN) 10 MG TABLET    Take 1 tablet by mouth nightly as needed (1 po hs) for up to 90 days. ALLERGIES     Nsaids; Prochlorperazine; Valtrex [valacyclovir hcl];  Morphine; Morphine and related; and Tolmetin    FAMILYHISTORY       Family History   Problem Relation Age of Onset    Cancer Father         Lymphoma    Arthritis Mother     Dementia Other     Diabetes Other     Cancer Other     Diabetes Maternal Uncle     Heart Disease Maternal Uncle     Depression Other     Heart Disease Maternal Uncle           SOCIAL HISTORY       Social History     Tobacco Use    Smoking status: Former Smoker     Packs/day: 0.50     Years: 25.00     Pack years: 12.50     Types: Cigarettes     Last attempt to quit: 2017     Years since quittin.6    Smokeless tobacco: Never Used   Substance Use Topics    Alcohol use: No     Alcohol/week: 0.0 standard drinks     Comment: last drink     Drug use: No       SCREENINGS             PHYSICAL EXAM    (up to 7 for level 4, 8 or more for level 5)     ED Triage Vitals   BP Temp Temp Source Pulse Resp SpO2 Height Weight   20 1510 20 1500 20 1500 20 1510 03/25/20 1510 03/25/20 1510 03/25/20 1510 03/25/20 1510   134/78 97.7 °F (36.5 °C) Oral 73 17 98 % 6' (1.829 m) 242 lb 15.2 oz (110.2 kg)       Physical Exam  Vitals signs and nursing note reviewed. Constitutional:       Appearance: Normal appearance. He is well-developed and normal weight. HENT:      Head: Normocephalic and atraumatic. Right Ear: External ear normal.      Left Ear: External ear normal.   Eyes:      General: No scleral icterus. Right eye: No discharge. Left eye: No discharge. Conjunctiva/sclera: Conjunctivae normal.   Neck:      Musculoskeletal: Normal range of motion and neck supple. Cardiovascular:      Rate and Rhythm: Normal rate and regular rhythm. Heart sounds: Normal heart sounds. Pulmonary:      Effort: Pulmonary effort is normal.      Breath sounds: Normal breath sounds. Musculoskeletal: Normal range of motion. Comments: Patient has no surrounding erythema involving the right knee. No erythema surrounding the incision site/area of the wound VAC. Wound VAC functioning properly. Leg generally larger than left however this is his baseline. No tender involving the lower leg. He has a strong PT pulse noted. He has sensation to toes noted. Skin:     General: Skin is warm and dry. Findings: No erythema. Neurological:      General: No focal deficit present. Mental Status: He is alert and oriented to person, place, and time. Mental status is at baseline. Psychiatric:         Mood and Affect: Mood normal.         Behavior: Behavior normal.         Thought Content:  Thought content normal.         Judgment: Judgment normal.                   DIAGNOSTIC RESULTS   LABS:    Labs Reviewed   CBC WITH AUTO DIFFERENTIAL - Abnormal; Notable for the following components:       Result Value    Hemoglobin 13.0 (*)     Hematocrit 40.1 (*)     All other components within normal limits    Narrative:     Performed at:  [facility]  [address] [facility]   Phone [phone]   BASIC METABOLIC PANEL - Abnormal; Notable for the following components:    Glucose 112 (*)     All other components within normal limits    Narrative:     Performed at:  [facility]  [address] [facility]   Phone [phone]   C-REACTIVE PROTEIN - Abnormal; Notable for the following components:    CRP 5.6 (*)     All other components within normal limits    Narrative:     Performed at:  [facility]  [address] [facility]   Phone [phone]   CULTURE, BLOOD 1   CULTURE, BLOOD 2   SEDIMENTATION RATE    Narrative:     Performed at:  [facility]  [address]  [place] [facility]   Phone [phone]       All other labs were within normal range or not returned as of this dictation. EKG: All EKG's are interpreted by the Emergency Department Physician in the absence of a cardiologist.  Please see their note for interpretation of EKG. RADIOLOGY:   Non-plain film images such as CT, Ultrasound and MRI are read by the radiologist. Plain radiographic images are visualized and preliminarily interpreted by the ED Provider with the below findings:    X-ray right knee shows no osseous abnormality. No evidence of gas in tissue. Swelling anterior noted consistent with exam.    Interpretation per the Radiologist below, if available at the time of this note:    XR KNEE RIGHT (1-2 VIEWS)   Final Result   1. No acute osseous abnormality of the right knee. 2. Intact right knee arthroplasty, without evidence of orthopedic hardware   complication. 3. Mild anterior soft tissue swelling without soft tissue gas or radiopaque   foreign body.            Xr Knee Right (1-2 Views)    Result Date: 3/25/2020  EXAMINATION: 2 XRAY VIEWS OF THE RIGHT KNEE 3/25/2020 3:12 pm COMPARISON: 02/10/2020 HISTORY: ORDERING SYSTEM PROVIDED HISTORY: pain TECHNOLOGIST PROVIDED HISTORY: Reason for exam:->pain Reason for Exam: Knee Pain (right knee pain. pt with history of knee repair. Wound vac in place ) Acuity: Chronic Type of Exam: Ongoing FINDINGS: Frontal and lateral views of the right knee were performed. There is no acute fracture or dislocation. There is no evidence of a joint effusion. The patient is status post right knee arthroplasty and patellar resurfacing, with stable mild lucency beneath the limb of the tibial prostheses, unchanged, likely postoperative in etiology. The orthopedic hardware is stable and unchanged, without evidence of loosening, dislocation, or fracture. There is no acute osseous abnormality. A small fabella is noted. There is mild anterior soft tissue swelling. 1. No acute osseous abnormality of the right knee. 2. Intact right knee arthroplasty, without evidence of orthopedic hardware complication. 3. Mild anterior soft tissue swelling without soft tissue gas or radiopaque foreign body. Ct Chest Pulmonary Embolism W Contrast    Result Date: 3/21/2020  EXAMINATION: CTA OF THE CHEST 3/21/2020 11:42 am TECHNIQUE: CTA of the chest was performed after the administration of intravenous contrast.  Multiplanar reformatted images are provided for review. MIP images are provided for review. Dose modulation, iterative reconstruction, and/or weight based adjustment of the mA/kV was utilized to reduce the radiation dose to as low as reasonably achievable. COMPARISON: 04/07/2018 CT HISTORY: ORDERING SYSTEM PROVIDED HISTORY: cp/sob ro pe/pna, post op knee infection TECHNOLOGIST PROVIDED HISTORY: Reason for exam:->cp/sob ro pe/pna, post op knee infection Reason for Exam: sob, infection in knee Acuity: Acute Type of Exam: Initial FINDINGS: Pulmonary Arteries: Study is of good technical quality for evaluation of pulmonary embolism. There are no filling defects to suggest pulmonary embolism. Main pulmonary artery is normal in caliber. No evidence of right ventricular strain. Mediastinum: The heart size is normal. Aorta is normal in caliber. Superior mediastinum is normal. No mediastinal or hilar lymph node enlargement. Lungs/pleura: Airways are patent. No pleural fluid is present. No pneumothorax. The lungs are well expanded and clear. Upper Abdomen: Visualized abdominal structures in the upper abdomen are normal. Soft Tissues/Bones: No skeletal abnormalities throughout the chest.     No evidence of pulmonary embolism or acute pulmonary abnormality. Vl Extremity Venous Right    Result Date: 3/23/2020  Lower Extremities DVT Study  Demographics   Patient Name      Chaitanya Monaco   Date of Study     03/23/2020        Gender             Male   Patient Number    [id number]        Date of Birth      1970   Visit Number      [id number]         Age                51 year(s)   Accession Number  [id number]         Room Number   Corporate ID      [id number]          Sonographer   Ordering          [address] [facility]  Physician         Tenzin Maravilla MD       Physician          Junior Johnston MD  Procedure Type of Study:   Veins:Lower Extremities DVT Study, VL EXTREMITY VENOUS DUPLEX RIGHT. Indications for Study:Swelling. Additional Indications:multiple recent knee surgeries, wound vac on right knee Impressions Right Impression No evidence of deep vein or superficial vein thrombosis involving the right lower extremity and the left common femoral vein. Incidental finding on the right: groin, multiple enlarged lymph nodes noted. Conclusions   Summary   No evidence of deep vein or superficial vein thrombosis involving the right  lower extremity and the contralateral proximal left common femoral vein.    Pulsatile venous flow is present throughout the lower extremity,  particularly at the common femoral vein, and is consistent with venous  hypertension likely of central vein origin. Incidental finding of mildly enlarged lymph nodes in the right groin region. Signature   ------------------------------------------------------------------  Electronically signed by Jignesh Green MD  (Interpreting physician) on 03/23/2020 at 04:37 PM  ------------------------------------------------------------------  Patient Status:STAT. [facility]. Technical Quality:Adequate visualization. Velocities are measured in cm/s ; Diameters are measured in mm Right Lower Extremities DVT Study Measurements Right 2D Measurements +------------------------+----------+---------------+----------+ ! Location                ! Visualized! Compressibility! Thrombosis! +------------------------+----------+---------------+----------+ ! Sapheno Femoral Junction! Yes       ! Yes            ! None      ! +------------------------+----------+---------------+----------+ ! GSV Thigh               ! Yes       ! Yes            ! None      ! +------------------------+----------+---------------+----------+ ! Common Femoral          !Yes       ! Yes            ! None      ! +------------------------+----------+---------------+----------+ ! Prox Femoral            !Yes       ! Yes            ! None      ! +------------------------+----------+---------------+----------+ ! Mid Femoral             !Yes       ! Yes            ! None      ! +------------------------+----------+---------------+----------+ ! Dist Femoral            !Yes       ! Yes            ! None      ! +------------------------+----------+---------------+----------+ ! Deep Femoral            !Yes       ! Yes            ! None      ! +------------------------+----------+---------------+----------+ ! Popliteal               !Yes       ! Yes            ! None      ! +------------------------+----------+---------------+----------+ ! GSV Below Knee          ! Yes       ! Yes            ! None      ! +------------------------+----------+---------------+----------+ ! Gastroc                 ! Yes       ! Yes            ! None      ! +------------------------+----------+---------------+----------+ ! Soleal                  !Yes       ! Yes            ! None      ! +------------------------+----------+---------------+----------+ ! PTV                     ! Yes       ! Yes            ! None      ! +------------------------+----------+---------------+----------+ ! ATV                     ! Yes       ! Yes            ! None      ! +------------------------+----------+---------------+----------+ ! Peroneal                !Yes       ! Yes            ! None      ! +------------------------+----------+---------------+----------+ ! GSV Calf                ! Yes       ! Yes            ! None      ! +------------------------+----------+---------------+----------+ ! SSV                     ! Yes       ! Yes            ! None      ! +------------------------+----------+---------------+----------+ Right Doppler Measurements +--------------+------+------+------------+ ! Location      ! Signal!Reflux! Reflux (sec)! +--------------+------+------+------------+ ! Common Femoral!Phasic! No    !            ! +--------------+------+------+------------+ ! Popliteal     !Phasic! No    !            ! +--------------+------+------+------------+ Left Lower Extremities DVT Study Measurements Left 2D Measurements +--------------+----------+---------------+----------+ ! Location      ! Visualized! Compressibility! Thrombosis! +--------------+----------+---------------+----------+ ! Common Femoral!Yes       ! Yes            ! None      ! +--------------+----------+---------------+----------+ Left Doppler Measurements +--------------+------+------+------------+ ! Location      ! Signal!Reflux! Reflux (sec)! +--------------+------+------+------------+ ! Common Femoral!Phasic! No    !            ! +--------------+------+------+------------+          PROCEDURES   Unless otherwise noted below,

## 2020-03-26 ENCOUNTER — TELEPHONE (OUTPATIENT)
Dept: ORTHOPEDIC SURGERY | Age: 50
End: 2020-03-26

## 2020-03-26 ENCOUNTER — CARE COORDINATION (OUTPATIENT)
Dept: CASE MANAGEMENT | Age: 50
End: 2020-03-26

## 2020-03-26 NOTE — ED PROVIDER NOTES
I independently performed a history and physical on Tang Naik. All diagnostic, treatment, and disposition decisions were made by myself in conjunction with the advanced practice provider. Briefly, this is a 51 y.o. male here for right knee pain. Patient has extensive surgical history, underwent TKA and had complicated post-operative course requiring spacer replacement and antibiotics for infection managed by ID. States yesterday he twisted his knee and since has been in more severe pain. On exam, Patient afebrile and nontoxic. No distress. Heart RRR. Lungs CTAB. Right knee mildly edematous, no overlying erythema or rash. Wound vac in place overlying patellar region with SS drainage. Full extension, active ROM to 90 degrees in flexion. Calf and thigh compartments soft and nontender to compression. 5/5 motor and sensation grossly intact distal LE's. DP 2+ and symmetric. Screenings            MDM    Patient afebrile and nontoxic. LE's neurovascularly intact. XR without evidence of acute fracture, dislocation or hardware malalignment. Low suspicion for compartment syndrome or thrombus. Joint infection considered, knee still with ROM to 90 degrees, no erythema, lab workup with trivial elevation of CRP and normal ESR, septic arthritis felt highly unlikely. Significant risks of arthrocentesis to post-operative knee outweigh any potential clinical benefit. Patient reports minor trauma as precipitant of worsened pain. Initially stated to me that he does not take any medications for pain at home, however on PDMP report he has been receiving weekly supply of oxycodone from his orthopedist, most recently filled a prescription on 3/23/20. [place] safe for discharge to self care. He may follow up with his orthopedist or PCP should he require additional narcotic pain medication. Strict return precautions were discussed.      Patient Referrals:  Iris Jones MD  [address] [place] [address] [address]    Schedule an appointment as soon as possible for a visit in 1 day      Isabella Rubio MD  [address] [facility]  [address] [facility] [address]  [phone]    Schedule an appointment as soon as possible for a visit in 2 days      [facility] Emergency Department  [address] [address]  [phone]  Go to   If symptoms worsen      Discharge Medications:  Discharge Medication List as of 3/25/2020  7:59 PM      START taking these medications    Details   acetaminophen (TYLENOL) 500 MG tablet Take 2 tablets by mouth 3 times daily (with meals), Disp-42 tablet, R-0Print             FINAL IMPRESSION  1. Right knee pain, unspecified chronicity        Blood pressure 120/77, pulse 75, temperature 97.8 °F (36.6 °C), temperature source Oral, resp. rate 16, height 6' (1.829 m), weight 242 lb 15.2 oz (110.2 kg), SpO2 97 %. For further details of Edith Jonny Payton's emergency department encounter, please see documentation by advanced practice provider, JEMAL Fry.     Johnny Mercedes DO (electronically signed)  Attending Emergency Physician       Johnny Mercedes DO  03/26/20 0582

## 2020-03-26 NOTE — CARE COORDINATION
underlying condition or if you are sick.     For more information on steps you can take to protect yourself, see CDC's How to [place] for follow-up call in 3-5 days based on severity of symptoms and risk factors

## 2020-03-27 ENCOUNTER — HOSPITAL ENCOUNTER (EMERGENCY)
Age: 50
Discharge: HOME | End: 2020-03-27
Attending: EMERGENCY MEDICINE
Payer: COMMERCIAL

## 2020-03-27 ENCOUNTER — APPOINTMENT (OUTPATIENT)
Dept: GENERAL RADIOLOGY | Age: 50
End: 2020-03-27
Payer: COMMERCIAL

## 2020-03-27 ENCOUNTER — TELEPHONE (OUTPATIENT)
Dept: ORTHOPEDIC SURGERY | Age: 50
End: 2020-03-27

## 2020-03-27 VITALS
WEIGHT: 245 LBS | OXYGEN SATURATION: 99 % | RESPIRATION RATE: 16 BRPM | HEIGHT: 72 IN | BODY MASS INDEX: 33.18 KG/M2 | SYSTOLIC BLOOD PRESSURE: 139 MMHG | TEMPERATURE: 98.2 F | DIASTOLIC BLOOD PRESSURE: 79 MMHG | HEART RATE: 72 BPM

## 2020-03-27 PROCEDURE — 6370000000 HC RX 637 (ALT 250 FOR IP): Performed by: EMERGENCY MEDICINE

## 2020-03-27 PROCEDURE — 99284 EMERGENCY DEPT VISIT MOD MDM: CPT

## 2020-03-27 PROCEDURE — 73560 X-RAY EXAM OF KNEE 1 OR 2: CPT

## 2020-03-27 RX ORDER — OXYCODONE HYDROCHLORIDE AND ACETAMINOPHEN 5; 325 MG/1; MG/1
1 TABLET ORAL ONCE
Status: COMPLETED | OUTPATIENT
Start: 2020-03-27 | End: 2020-03-27

## 2020-03-27 RX ADMIN — OXYCODONE HYDROCHLORIDE AND ACETAMINOPHEN 1 TABLET: 5; 325 TABLET ORAL at 19:27

## 2020-03-27 ASSESSMENT — PAIN DESCRIPTION - FREQUENCY
FREQUENCY: CONTINUOUS
FREQUENCY: CONTINUOUS
FREQUENCY: INTERMITTENT

## 2020-03-27 ASSESSMENT — PAIN DESCRIPTION - DESCRIPTORS
DESCRIPTORS: ACHING
DESCRIPTORS: ACHING
DESCRIPTORS: SHARP;THROBBING

## 2020-03-27 ASSESSMENT — PAIN DESCRIPTION - ORIENTATION
ORIENTATION: RIGHT

## 2020-03-27 ASSESSMENT — PAIN DESCRIPTION - PAIN TYPE
TYPE: ACUTE PAIN

## 2020-03-27 ASSESSMENT — PAIN DESCRIPTION - LOCATION
LOCATION: KNEE

## 2020-03-27 ASSESSMENT — PAIN SCALES - GENERAL
PAINLEVEL_OUTOF10: 8
PAINLEVEL_OUTOF10: 8
PAINLEVEL_OUTOF10: 10

## 2020-03-27 ASSESSMENT — PAIN - FUNCTIONAL ASSESSMENT: PAIN_FUNCTIONAL_ASSESSMENT: 0-10

## 2020-03-27 NOTE — ED PROVIDER NOTES
injury. He reports that the wound VAC was last emptied this morning and there has been no further output. He denies any bleeding, fever, chills, redness, or purulent drainage. HPI    Nursing Notes were reviewed. REVIEW OF SYSTEMS    (2-9 systems for level 4, 10 or more for level 5)       Constitutional: Negative for fever or chills. HENT: Negative for rhinorrhea and sore throat. Eyes: Negative for redness or drainage. Respiratory: Negative for shortness of breath or dyspnea on exertion. Cardiovascular: Negative for chest pain. Gastrointestinal: Negative for abdominal pain. Negative for vomiting or diarrhea. Genitourinary: Negative for flank pain. Negative for dysuria. Negative for hematuria. Neurological: Negative for headache. All systems are reviewed and are negative except for those listed above in the history of present illness and ROS.         PAST MEDICAL HISTORY     Past Medical History:   Diagnosis Date    Alcoholism [facility])     last drink 2015    Allergic migraine with status migrainosus     Allergic rhinitis, seasonal     Anemia     Arthritis     right knee    Vaughn's esophagus     Benign intracranial hypertension     Cancer (HCC)     renal     Carpal tunnel syndrome     Chronic pain     Depression     Depression     GERD (gastroesophageal reflux disease)     Headache(784.0)     History of blood transfusion     History of tobacco use     Quit 7/2014    Migraine     chronic for 1 year    Morbid obesity ([address]     Movement disorder     Onychomycosis     Sleep apnea, obstructive     Severe uses cpap stop bang 6    Unspecified cerebral artery occlusion with cerebral infarction 2014    slight weakness in left arm    Wears dentures     full set    Wears glasses          SURGICAL HISTORY       Past Surgical History:   Procedure Laterality Date    ABDOMEN SURGERY      gastric bypass    ABDOMEN SURGERY  4-7-2016    repair of recurrent incisional hernia quit: 2017     Years since quittin.6    Smokeless tobacco: Never Used   Substance and Sexual Activity    Alcohol use: No     Alcohol/week: 0.0 standard drinks     Comment: last drink 2016    Drug use: No    Sexual activity: Yes     Partners: Female     Comment: wife Laverne Dangelo   Lifestyle    Physical activity     Days per week: None     Minutes per session: None    Stress: None   Relationships    Social connections     Talks on phone: None     Gets together: None     Attends L[employer] day service: None     Active member of club or organization: None     Attends meetings of clubs or organizations: None     Relationship status: None    Intimate partner violence     Fear of current or ex partner: None     Emotionally abused: None     Physically abused: None     Forced sexual activity: None   Other Topics Concern    None   Social History Narrative    None       SCREENINGS             PHYSICAL EXAM    (up to 7 for level 4, 8 or more for level 5)     ED Triage Vitals [20 1826]   BP Temp Temp Source Pulse Resp SpO2 Height Weight   139/79 98.2 °F (36.8 °C) Oral 72 16 99 % 6' (1.829 m) 245 lb (111.1 kg)         Physical Exam   Constitutional: Awake and alert. Not ill-appearing. Mild discomfort. Head: No visible evidence of trauma. Normocephalic. Eyes: Pupils equal and reactive. No photophobia. Conjunctiva normal.    HENT: Oral mucosa moist.  Airway patent. Pharynx without erythema. Nares were clear. Neck:  Soft and supple. Nontender. Heart:  Regular rate and rhythm. No murmur. Lungs:  Clear to auscultation. No wheezes, rales, or ronchi. No conversational dyspnea or accessory muscle use. Chest: Chest wall non-tender. No evidence of trauma. Abdomen:  Soft, nondistended, bowel sounds present. Nontender. No guarding rigidity or rebound. No masses. Musculoskeletal: Pelvis stable nontender. Thoracic and lumbar spine were nontender. No point tenderness or step-off.   Extremities non-tender with full range of motion with the exception of the right knee. Right hip ankle and foot were nontender to palpation with full range of motion. There was soft tissue swelling in the right peripatellar area but no associated erythema or induration or warmth. Wound VAC was in good position with good seal and no evidence of any drainage or bleeding. Wound VAC reservoir is dry. Radial and dorsalis pedis pulses were intact. No calf tenderness erythema or edema. Mild edema of the right lower extremity was noted which has been present since surgery. No calf tenderness. No palpable or visible venous cords. Neurological: Alert and oriented x 3. Speech clear. Cranial nerves II-XII intact. No facial droop. No acute focal motor or sensory deficits. Skin: Skin is warm and dry. No rash. Psychiatric: Normal mood and affect. Behavior is normal.         DIAGNOSTIC RESULTS     EKG: All EKG's are interpreted by the Emergency Department Physician who either signs or Co-signs this chart in the absence of a cardiologist.        RADIOLOGY:   Non-plain film images such as CT, Ultrasound and MRI are read by the radiologist. Plain radiographic images are visualized and preliminarily interpreted by the emergency physician with the below findings:        Interpretation per the Radiologist below, if available at the time of this note:    XR KNEE RIGHT (1-2 VIEWS)   Final Result   Anterior soft tissue swelling. Right knee joint effusion. There is a medical device with tubing, possibly a drain on the anterior skin   surface of the right knee. Deep to this device, there is gas/air either   within a bandage or just beneath the skin surface. Clinical correlation   recommended. ED BEDSIDE ULTRASOUND:   Performed by ED Physician - none    LABS:  Labs Reviewed - No data to display    All other labs were within normal range or not returned as of this dictation.     EMERGENCY DEPARTMENT COURSE and DIFFERENTIAL

## 2020-03-29 ENCOUNTER — CARE COORDINATION (OUTPATIENT)
Dept: CARE COORDINATION | Age: 50
End: 2020-03-29

## 2020-03-29 LAB
BLOOD CULTURE, ROUTINE: NORMAL
CULTURE, BLOOD 2: NORMAL

## 2020-03-29 NOTE — CARE COORDINATION
Patient contacted regarding recent discharge and COVID-19 risk   Care Transition Nurse/ Ambulatory Care Manager contacted the patient by telephone to perform post discharge assessment. Verified name and  with patient as identifiers. Patient has following risk factors of: OSCAR. CTN/ACM reviewed discharge instructions, medical action plan and red flags related to discharge diagnosis. Reviewed and educated them on any new and changed medications related to discharge diagnosis. Advised obtaining a 90-day supply of all daily and as-needed medications. Education provided regarding infection prevention, and signs and symptoms of COVID-19 and when to seek medical attention with patient who verbalized understanding. Discussed exposure protocols and quarantine from [address] you at higher risk for severe illness  and given an opportunity for questions and concerns. The patient agrees to contact the COVID-19 hotline [phone] or PCP office for questions related to their healthcare. CTN/ACM provided contact information for future reference. From CDC: Are you at higher risk for severe illness?  Wash your hands often.  Avoid close contact (6 feet, which is about two arm lengths) with people who are sick.  Put distance between yourself and other people if COVID-19 is spreading in your community.  Clean and disinfect frequently touched surfaces.  Avoid all cruise travel and non-essential air travel.  Call your healthcare professional if you have concerns about COVID-19 and your underlying condition or if you are sick. For more information on steps you can take to protect yourself, see CDC's How to [address] Karen Maldonado verbalized understanding of follow up. He is still having knee pain and has contacted Dr. Teagan Mane office to schedule a visit.

## 2020-03-31 RX ORDER — OXYCODONE HYDROCHLORIDE 5 MG/1
5 TABLET ORAL EVERY 6 HOURS PRN
Qty: 28 TABLET | Refills: 0 | Status: SHIPPED | OUTPATIENT
Start: 2020-03-31 | End: 2020-04-07

## 2020-04-02 ENCOUNTER — OFFICE VISIT (OUTPATIENT)
Dept: ORTHOPEDIC SURGERY | Age: 50
End: 2020-04-02

## 2020-04-02 VITALS — TEMPERATURE: 99 F | HEIGHT: 72 IN | BODY MASS INDEX: 33.18 KG/M2 | WEIGHT: 245 LBS

## 2020-04-02 PROCEDURE — 99024 POSTOP FOLLOW-UP VISIT: CPT | Performed by: PHYSICIAN ASSISTANT

## 2020-04-02 RX ORDER — CYCLOBENZAPRINE HCL 10 MG
10 TABLET ORAL NIGHTLY PRN
Qty: 30 TABLET | Refills: 0 | Status: ON HOLD | OUTPATIENT
Start: 2020-04-02 | End: 2020-04-16 | Stop reason: HOSPADM

## 2020-04-02 NOTE — PROGRESS NOTES
This dictation was done with Rapid7 dictation and may contain mechanical errors related to translation. The review of systems was currently provided by the patient and reviewed with the medical assistant at today's visit. Please see media. Subjective:  Florina Otto is a 51 y.o. who is here in follow-up for his right knee. He had immediate relief after the total knee replacement revision on 12/17/2019. He states he was doing well until he had the incident that causes incision to open up and then he had an I&D and poly-exchange on 1/22/2020 and subsequently on 2/18/2020 he was converted to a wound VAC. Over the past few weeks the wound VAC is working he is got 0 to 120 degrees of motion the overall size of the wound VAC is approximately 8 cm longitudinally and about 2 to 3 cm wide. He is getting pain and spasms and for that he was given a mallet and pain management referral as well as a prescription for a muscle relaxer. 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  Chondromalacia of patellofemoral joint    Chronic pain syndrome    History of stroke    Gastroesophageal reflux disease with esophagitis--on daily ppi    Intractable chronic migraine without aura and with status migrainosus    Iron deficiency anemia    B12 deficiency    Anastomotic ulcer    Malignant neoplasm of left kidney (HCC) clear cell. 8/1/18 Dr Kirill Singh.     Total knee replacement status, right    Failed total knee, right, initial encounter ([facility]    [facility]    History of depression    History of alcoholism ([facility]    Receiving intravenous antibiotic treatment as outpatient           Current Outpatient Medications on File Prior to Visit   Medication Sig Dispense Refill    oxyCODONE (ROXICODONE) 5 MG immediate release tablet Take 1 tablet by mouth every 6 hours as needed for Pain for up to 7 days. 28 tablet 0    acetaminophen (TYLENOL) 500 MG tablet Take 2 tablets by mouth 3 times daily (with meals) 42 tablet 0    traZODone (DESYREL) 100 MG tablet Take 2 tablets by mouth nightly 60 tablet 0    zolpidem (AMBIEN) 10 MG tablet Take 1 tablet by mouth nightly as needed (1 po hs) for up to 90 days. 90 tablet 1    doxycycline hyclate (VIBRA-TABS) 100 MG tablet Take 1 tablet by mouth 2 times daily 60 tablet 1    FLUoxetine (PROZAC) 20 MG capsule TAKE 1 CAPSULE BY MOUTH DAILY 90 capsule 0    oxyCODONE (ROXICODONE) 5 MG immediate release tablet Take 5 mg by mouth every 4 hours as needed for Pain.  pantoprazole (PROTONIX) 40 MG tablet Take 1 tablet by mouth 2 times daily      aspirin EC 81 MG EC tablet Take 1 tablet by mouth 2 times daily for 14 days Please avoid missing doses. 28 tablet 0    dicyclomine (BENTYL) 20 MG tablet Take 40 mg by mouth 2 times daily as needed  0    atorvastatin (LIPITOR) 40 MG tablet TAKE 1 TABLET BY MOUTH EVERY NIGHT AT BEDTIME 90 tablet 1    sildenafil (REVATIO) 20 MG tablet Take 4 tablets by mouth as needed (30 min prior to intercourse) 30 tablet 5    calcium-vitamin D (OSCAL 500/200 D-3) 500-200 MG-UNIT per tablet Take 1 tablet by mouth 2 times daily       Multiple Vitamin TABS Take 1 tablet by mouth daily        No current facility-administered medications on file prior to visit. Objective:   Temperature 99 °F (37.2 °C), height 6' (1.829 m), weight 245 lb (111.1 kg).     His exam shows wound VAC is still viable and working the overall

## 2020-04-03 ENCOUNTER — APPOINTMENT (OUTPATIENT)
Dept: CT IMAGING | Age: 50
DRG: 390 | End: 2020-04-03
Payer: COMMERCIAL

## 2020-04-03 ENCOUNTER — HOSPITAL ENCOUNTER (INPATIENT)
Age: 50
LOS: 4 days | Discharge: HOME HEALTH | DRG: 390 | End: 2020-04-07
Attending: EMERGENCY MEDICINE | Admitting: SURGERY
Payer: COMMERCIAL

## 2020-04-03 PROBLEM — K56.609 SMALL BOWEL OBSTRUCTION (HCC): Status: ACTIVE | Noted: 2020-04-03

## 2020-04-03 PROBLEM — K56.609 SBO (SMALL BOWEL OBSTRUCTION) (HCC): Status: ACTIVE | Noted: 2020-04-03

## 2020-04-03 LAB
A/G RATIO: 1.3 (ref 1.1–2.2)
ALBUMIN SERPL-MCNC: 4 G/DL (ref 3.4–5)
ALP BLD-CCNC: 123 U/L (ref 40–129)
ALT SERPL-CCNC: 10 U/L (ref 10–40)
ANION GAP SERPL CALCULATED.3IONS-SCNC: 14 MMOL/L (ref 3–16)
AST SERPL-CCNC: 14 U/L (ref 15–37)
BASOPHILS ABSOLUTE: 0.1 K/UL (ref 0–0.2)
BASOPHILS RELATIVE PERCENT: 0.6 %
BILIRUB SERPL-MCNC: 0.3 MG/DL (ref 0–1)
BILIRUBIN URINE: NEGATIVE
BLOOD, URINE: NEGATIVE
BUN BLDV-MCNC: 10 MG/DL (ref 7–20)
CALCIUM SERPL-MCNC: 9.5 MG/DL (ref 8.3–10.6)
CHLORIDE BLD-SCNC: 102 MMOL/L (ref 99–110)
CLARITY: CLEAR
CO2: 25 MMOL/L (ref 21–32)
COLOR: NORMAL
CREAT SERPL-MCNC: 1 MG/DL (ref 0.9–1.3)
EOSINOPHILS ABSOLUTE: 0.2 K/UL (ref 0–0.6)
EOSINOPHILS RELATIVE PERCENT: 1.7 %
GFR AFRICAN AMERICAN: >60
GFR NON-AFRICAN AMERICAN: >60
GLOBULIN: 3 G/DL
GLUCOSE BLD-MCNC: 110 MG/DL (ref 70–99)
GLUCOSE URINE: NEGATIVE MG/DL
HCT VFR BLD CALC: 40.7 % (ref 40.5–52.5)
HEMOGLOBIN: 13.1 G/DL (ref 13.5–17.5)
KETONES, URINE: NEGATIVE MG/DL
LACTIC ACID: 1.1 MMOL/L (ref 0.4–2)
LEUKOCYTE ESTERASE, URINE: NEGATIVE
LIPASE: 18 U/L (ref 13–60)
LYMPHOCYTES ABSOLUTE: 1.7 K/UL (ref 1–5.1)
LYMPHOCYTES RELATIVE PERCENT: 14.5 %
MCH RBC QN AUTO: 28.6 PG (ref 26–34)
MCHC RBC AUTO-ENTMCNC: 32.3 G/DL (ref 31–36)
MCV RBC AUTO: 88.7 FL (ref 80–100)
MICROSCOPIC EXAMINATION: NORMAL
MONOCYTES ABSOLUTE: 0.7 K/UL (ref 0–1.3)
MONOCYTES RELATIVE PERCENT: 5.9 %
NEUTROPHILS ABSOLUTE: 8.8 K/UL (ref 1.7–7.7)
NEUTROPHILS RELATIVE PERCENT: 77.3 %
NITRITE, URINE: NEGATIVE
PDW BLD-RTO: 14.9 % (ref 12.4–15.4)
PH UA: 7.5 (ref 5–8)
PLATELET # BLD: 379 K/UL (ref 135–450)
PMV BLD AUTO: 9.1 FL (ref 5–10.5)
POTASSIUM REFLEX MAGNESIUM: 4.3 MMOL/L (ref 3.5–5.1)
PROTEIN UA: NEGATIVE MG/DL
RBC # BLD: 4.59 M/UL (ref 4.2–5.9)
SODIUM BLD-SCNC: 141 MMOL/L (ref 136–145)
SPECIFIC GRAVITY UA: <=1.005 (ref 1–1.03)
TOTAL PROTEIN: 7 G/DL (ref 6.4–8.2)
URINE REFLEX TO CULTURE: NORMAL
URINE TYPE: NORMAL
UROBILINOGEN, URINE: 0.2 E.U./DL
WBC # BLD: 11.5 K/UL (ref 4–11)

## 2020-04-03 PROCEDURE — 6360000002 HC RX W HCPCS: Performed by: EMERGENCY MEDICINE

## 2020-04-03 PROCEDURE — 2500000003 HC RX 250 WO HCPCS: Performed by: EMERGENCY MEDICINE

## 2020-04-03 PROCEDURE — 36415 COLL VENOUS BLD VENIPUNCTURE: CPT

## 2020-04-03 PROCEDURE — 80053 COMPREHEN METABOLIC PANEL: CPT

## 2020-04-03 PROCEDURE — 1200000000 HC SEMI PRIVATE

## 2020-04-03 PROCEDURE — 83690 ASSAY OF LIPASE: CPT

## 2020-04-03 PROCEDURE — C9113 INJ PANTOPRAZOLE SODIUM, VIA: HCPCS | Performed by: SURGERY

## 2020-04-03 PROCEDURE — 6360000002 HC RX W HCPCS: Performed by: SURGERY

## 2020-04-03 PROCEDURE — 85025 COMPLETE CBC W/AUTO DIFF WBC: CPT

## 2020-04-03 PROCEDURE — 96376 TX/PRO/DX INJ SAME DRUG ADON: CPT

## 2020-04-03 PROCEDURE — 6360000002 HC RX W HCPCS: Performed by: PHYSICIAN ASSISTANT

## 2020-04-03 PROCEDURE — 74177 CT ABD & PELVIS W/CONTRAST: CPT

## 2020-04-03 PROCEDURE — 81003 URINALYSIS AUTO W/O SCOPE: CPT

## 2020-04-03 PROCEDURE — 2580000003 HC RX 258: Performed by: PHYSICIAN ASSISTANT

## 2020-04-03 PROCEDURE — 96374 THER/PROPH/DIAG INJ IV PUSH: CPT

## 2020-04-03 PROCEDURE — 96375 TX/PRO/DX INJ NEW DRUG ADDON: CPT

## 2020-04-03 PROCEDURE — 6370000000 HC RX 637 (ALT 250 FOR IP): Performed by: SURGERY

## 2020-04-03 PROCEDURE — 99285 EMERGENCY DEPT VISIT HI MDM: CPT

## 2020-04-03 PROCEDURE — 6370000000 HC RX 637 (ALT 250 FOR IP): Performed by: PHYSICIAN ASSISTANT

## 2020-04-03 PROCEDURE — 2580000003 HC RX 258: Performed by: SURGERY

## 2020-04-03 PROCEDURE — 83605 ASSAY OF LACTIC ACID: CPT

## 2020-04-03 PROCEDURE — 94760 N-INVAS EAR/PLS OXIMETRY 1: CPT

## 2020-04-03 PROCEDURE — 6360000004 HC RX CONTRAST MEDICATION: Performed by: PHYSICIAN ASSISTANT

## 2020-04-03 RX ORDER — PANTOPRAZOLE SODIUM 40 MG/10ML
40 INJECTION, POWDER, LYOPHILIZED, FOR SOLUTION INTRAVENOUS DAILY
Status: DISCONTINUED | OUTPATIENT
Start: 2020-04-03 | End: 2020-04-07 | Stop reason: HOSPADM

## 2020-04-03 RX ORDER — ONDANSETRON 2 MG/ML
4 INJECTION INTRAMUSCULAR; INTRAVENOUS EVERY 6 HOURS PRN
Status: DISCONTINUED | OUTPATIENT
Start: 2020-04-03 | End: 2020-04-07 | Stop reason: HOSPADM

## 2020-04-03 RX ORDER — SODIUM CHLORIDE 0.9 % (FLUSH) 0.9 %
10 SYRINGE (ML) INJECTION EVERY 12 HOURS SCHEDULED
Status: DISCONTINUED | OUTPATIENT
Start: 2020-04-03 | End: 2020-04-07 | Stop reason: HOSPADM

## 2020-04-03 RX ORDER — SODIUM CHLORIDE 0.9 % (FLUSH) 0.9 %
10 SYRINGE (ML) INJECTION PRN
Status: DISCONTINUED | OUTPATIENT
Start: 2020-04-03 | End: 2020-04-07 | Stop reason: HOSPADM

## 2020-04-03 RX ORDER — SODIUM CHLORIDE 9 MG/ML
INJECTION, SOLUTION INTRAVENOUS CONTINUOUS
Status: DISCONTINUED | OUTPATIENT
Start: 2020-04-03 | End: 2020-04-07 | Stop reason: HOSPADM

## 2020-04-03 RX ORDER — TRAZODONE HYDROCHLORIDE 100 MG/1
200 TABLET ORAL NIGHTLY
Status: DISCONTINUED | OUTPATIENT
Start: 2020-04-03 | End: 2020-04-07 | Stop reason: HOSPADM

## 2020-04-03 RX ORDER — ACETAMINOPHEN 325 MG/1
650 TABLET ORAL EVERY 4 HOURS PRN
Status: DISCONTINUED | OUTPATIENT
Start: 2020-04-03 | End: 2020-04-07 | Stop reason: HOSPADM

## 2020-04-03 RX ORDER — ZOLPIDEM TARTRATE 5 MG/1
10 TABLET ORAL NIGHTLY PRN
Status: DISCONTINUED | OUTPATIENT
Start: 2020-04-03 | End: 2020-04-07 | Stop reason: HOSPADM

## 2020-04-03 RX ORDER — KETAMINE HCL IN NACL, ISO-OSM 100MG/10ML
30 SYRINGE (ML) INJECTION ONCE
Status: COMPLETED | OUTPATIENT
Start: 2020-04-03 | End: 2020-04-03

## 2020-04-03 RX ORDER — ONDANSETRON 2 MG/ML
4 INJECTION INTRAMUSCULAR; INTRAVENOUS ONCE
Status: COMPLETED | OUTPATIENT
Start: 2020-04-03 | End: 2020-04-03

## 2020-04-03 RX ORDER — SODIUM CHLORIDE 9 MG/ML
10 INJECTION INTRAVENOUS DAILY
Status: DISCONTINUED | OUTPATIENT
Start: 2020-04-03 | End: 2020-04-07 | Stop reason: HOSPADM

## 2020-04-03 RX ORDER — 0.9 % SODIUM CHLORIDE 0.9 %
1000 INTRAVENOUS SOLUTION INTRAVENOUS ONCE
Status: COMPLETED | OUTPATIENT
Start: 2020-04-03 | End: 2020-04-03

## 2020-04-03 RX ADMIN — TOPICAL ANESTHETIC: 200 SPRAY DENTAL; PERIODONTAL at 18:35

## 2020-04-03 RX ADMIN — IOVERSOL 100 ML: 678 INJECTION INTRA-ARTERIAL; INTRAVENOUS at 16:57

## 2020-04-03 RX ADMIN — SODIUM CHLORIDE: 9 INJECTION, SOLUTION INTRAVENOUS at 21:48

## 2020-04-03 RX ADMIN — ONDANSETRON HYDROCHLORIDE 4 MG: 2 SOLUTION INTRAMUSCULAR; INTRAVENOUS at 15:58

## 2020-04-03 RX ADMIN — HYDROMORPHONE HYDROCHLORIDE 0.5 MG: 1 INJECTION, SOLUTION INTRAMUSCULAR; INTRAVENOUS; SUBCUTANEOUS at 20:53

## 2020-04-03 RX ADMIN — SODIUM CHLORIDE, PRESERVATIVE FREE 10 ML: 5 INJECTION INTRAVENOUS at 20:53

## 2020-04-03 RX ADMIN — Medication 30 MG: at 15:58

## 2020-04-03 RX ADMIN — HYDROMORPHONE HYDROCHLORIDE 0.5 MG: 1 INJECTION, SOLUTION INTRAMUSCULAR; INTRAVENOUS; SUBCUTANEOUS at 17:43

## 2020-04-03 RX ADMIN — PANTOPRAZOLE SODIUM 40 MG: 40 INJECTION, POWDER, FOR SOLUTION INTRAVENOUS at 21:49

## 2020-04-03 RX ADMIN — ENOXAPARIN SODIUM 40 MG: 40 INJECTION SUBCUTANEOUS at 20:53

## 2020-04-03 RX ADMIN — HYDROMORPHONE HYDROCHLORIDE 1 MG: 1 INJECTION, SOLUTION INTRAMUSCULAR; INTRAVENOUS; SUBCUTANEOUS at 17:12

## 2020-04-03 RX ADMIN — TRAZODONE HYDROCHLORIDE 200 MG: 100 TABLET ORAL at 22:30

## 2020-04-03 RX ADMIN — SODIUM CHLORIDE 10 ML: 9 INJECTION INTRAMUSCULAR; INTRAVENOUS; SUBCUTANEOUS at 21:49

## 2020-04-03 RX ADMIN — SODIUM CHLORIDE 1000 ML: 9 INJECTION, SOLUTION INTRAVENOUS at 15:57

## 2020-04-03 ASSESSMENT — PAIN DESCRIPTION - LOCATION
LOCATION: ABDOMEN

## 2020-04-03 ASSESSMENT — PAIN DESCRIPTION - ORIENTATION
ORIENTATION: MID

## 2020-04-03 ASSESSMENT — PAIN SCALES - GENERAL
PAINLEVEL_OUTOF10: 8
PAINLEVEL_OUTOF10: 7
PAINLEVEL_OUTOF10: 9
PAINLEVEL_OUTOF10: 9
PAINLEVEL_OUTOF10: 6
PAINLEVEL_OUTOF10: 8

## 2020-04-03 ASSESSMENT — PAIN DESCRIPTION - FREQUENCY
FREQUENCY: CONTINUOUS

## 2020-04-03 ASSESSMENT — PAIN DESCRIPTION - ONSET
ONSET: GRADUAL

## 2020-04-03 ASSESSMENT — PAIN DESCRIPTION - DESCRIPTORS
DESCRIPTORS: THROBBING
DESCRIPTORS: THROBBING
DESCRIPTORS: THROBBING;SHARP

## 2020-04-03 ASSESSMENT — PAIN DESCRIPTION - PROGRESSION
CLINICAL_PROGRESSION: NOT CHANGED

## 2020-04-03 ASSESSMENT — PAIN - FUNCTIONAL ASSESSMENT
PAIN_FUNCTIONAL_ASSESSMENT: PREVENTS OR INTERFERES SOME ACTIVE ACTIVITIES AND ADLS
PAIN_FUNCTIONAL_ASSESSMENT: PREVENTS OR INTERFERES SOME ACTIVE ACTIVITIES AND ADLS

## 2020-04-03 ASSESSMENT — PAIN DESCRIPTION - PAIN TYPE
TYPE: ACUTE PAIN

## 2020-04-03 NOTE — ED PROVIDER NOTES
[address]      Pt Name: Nacho Hall  MRN: [id number]  [place] 1970  Date of evaluation: 4/3/2020  Provider: JEMAL Cartagena    Shared Visit or Autonomous Visit:  I have seen and evaluated this patient with my supervising physician Uli Whitman MD.      [address] [place] [address]       Chief Complaint   Patient presents with    Nausea    Abdominal Pain     reports abd pain since this am        HISTORY OF PRESENT ILLNESS  (Location/Symptom, Timing/Onset, Context/Setting, Quality, Duration, Modifying Factors, Severity.)   Nacho Hall is a 51 y.o. male who presents to the emergency department with a complaint of abdominal pain. Patient has a past history of gastric bypass surgery, subsequent adhesions, bowel resection, and kidney cancer with left nephrectomy, but he says that in recent months he has not had much abdominal pain at all. He says the pain began this morning when he woke up, it is diffuse but mostly around the center of the abdomen, and he has been nauseous all morning. He says he has been retching but he has not eaten anything and is nothing the stomach, so he has not vomited. He says he felt like he had to have a bowel on today but could not, and has not passed any stool in about 2 days. He says the last time he felt symptoms like this was when he had an obstruction. Says he is urinating normally. He says he was feeling normal yesterday. Denies any chest pain, shortness of breath, cough, cold symptoms or fever. Has recent history of knee replacement with subsequent infection and wound VAC placement, but he says his knee is improving and is not particularly bothering him now. No other complaints. Nursing Notes were reviewed and I agree. REVIEW OF SYSTEMS    (2-9 systems for level 4, 10 or more for level 5)     Constitutional:  Negative for fever, chills, appetite change, fatigue and unexpected weight change.    HENT: 500-200 MG-UNIT PER TABLET    Take 1 tablet by mouth 2 times daily     CYCLOBENZAPRINE (FLEXERIL) 10 MG TABLET    Take 1 tablet by mouth nightly as needed for Muscle spasms    DICYCLOMINE (BENTYL) 20 MG TABLET    Take 40 mg by mouth 2 times daily as needed    FLUOXETINE (PROZAC) 20 MG CAPSULE    TAKE 1 CAPSULE BY MOUTH DAILY    MULTIPLE VITAMIN TABS    Take 1 tablet by mouth daily     OXYCODONE (ROXICODONE) 5 MG IMMEDIATE RELEASE TABLET    Take 5 mg by mouth every 4 hours as needed for Pain. OXYCODONE (ROXICODONE) 5 MG IMMEDIATE RELEASE TABLET    Take 1 tablet by mouth every 6 hours as needed for Pain for up to 7 days. PANTOPRAZOLE (PROTONIX) 40 MG TABLET    Take 1 tablet by mouth 2 times daily    SILDENAFIL (REVATIO) 20 MG TABLET    Take 4 tablets by mouth as needed (30 min prior to intercourse)    TRAZODONE (DESYREL) 100 MG TABLET    Take 2 tablets by mouth nightly    ZOLPIDEM (AMBIEN) 10 MG TABLET    Take 1 tablet by mouth nightly as needed (1 po hs) for up to 90 days. ALLERGIES     Nsaids; Prochlorperazine; Valtrex [valacyclovir hcl]; Morphine; Morphine and related; and Tolmetin    FAMILY HISTORY           Problem Relation Age of Onset    Cancer Father         Lymphoma    Arthritis Mother     Dementia Other     Diabetes Other     Cancer Other     Diabetes Maternal Uncle     Heart Disease Maternal Uncle     Depression Other     Heart Disease Maternal Uncle      Family Status   Relation Name Status    Father   at age 61        non-hodgkin's lymphoma    Mother  Alive    Other  (Not Specified)    MUnc      Other [facility] (Not Specified)    MGM      MGF      [facility]      PGF      MUnc          SOCIAL HISTORY      reports that he quit smoking about 2 years ago. His smoking use included cigarettes. He has a 12.50 pack-year smoking history.  He has never used smokeless tobacco. He reports that he does not drink alcohol or use drugs.    PHYSICAL EXAM    (up to 7 for level 4, 8 or more for level 5)     ED Triage Vitals [04/03/20 1504]   BP Temp Temp Source Pulse Resp SpO2 Height Weight   129/75 98.3 °F (36.8 °C) Oral 59 19 100 % 6' (1.829 m) 244 lb 14.9 oz (111.1 kg)       Constitutional:  Appearing well-developed and well-nourished. No distress. HENT:  Normocephalic and atraumatic. Conjunctivae and EOM are normal. Pupils are equal, round, and reactive to light. Neck:  Normal range of motion. Neck supple. No tracheal deviation present. No thyromegaly present. No cervical adenopathy. Cardiovascular:  Normal rate, regular rhythm, normal heart sounds and intact distal pulses. Pulmonary/Chest:  Effort normal and breath sounds normal. No respiratory distress. No wheezes or rales. Abdominal:  Soft. Bowel sounds are normal.  Multiple well-healed surgical scars noted to the abdomen. Moderate tenderness to palpation diffusely, worse in the central area. No distension, mass, rebound or guarding. Musculoskeletal:  Normal range of motion. No edema exhibited. Neurological:  Alert and oriented to person, place, and time. No cranial nerve deficit. Skin:  Skin is warm and dry. Not diaphoretic. Psychiatric:  Normal mood, affect, behavior, judgment and thought content. DIAGNOSTIC RESULTS     RADIOLOGY:     Interpretation per the Radiologist below, if available at the time of this note:    CT ABDOMEN PELVIS W IV CONTRAST   Final Result   CT findings worrisome for small bowel obstruction, possibly closed loop and   due to an internal hernia. Recommend surgical consultation. Trace pelvic free fluid. Cholelithiasis. Findings were discussed with Anika Castaneda at 5:30 pm on 4/3/2020.              LABS:  Labs Reviewed   CBC WITH AUTO DIFFERENTIAL - Abnormal; Notable for the following components:       Result Value    WBC 11.5 (*)     Hemoglobin 13.1 (*)     Neutrophils Absolute 8.8 (*)     All other components within normal limits    Narrative:     Performed at:  [address] [facility]  [address] [place]   Phone [phone]   COMPREHENSIVE METABOLIC PANEL W/ REFLEX TO MG FOR LOW K - Abnormal; Notable for the following components:    Glucose 110 (*)     AST 14 (*)     All other components within normal limits    Narrative:     Performed at:  [facility]  [address] [place]   Phone [phone]   LIPASE    Narrative:     Performed at:  [address] [facility]  [address] [place]   Phone [phone]   URINE RT REFLEX TO CULTURE    Narrative:     Performed at:  [address] [facility]  [address] [place]   Phone [phone]   LACTIC ACID, PLASMA    Narrative:     Performed at:  [facility]  [address] [place]   Phone [phone]       All other labs were within normal range or not returned as of this dictation. EMERGENCY DEPARTMENT COURSE and DIFFERENTIAL DIAGNOSIS/MDM:   Vitals:    Vitals:    04/03/20 1504 04/03/20 1615 04/03/20 1630   BP: 129/75 129/74 115/72   Pulse: 59 61 54   Resp: 19 11 20   Temp: 98.3 °F (36.8 °C)     TempSrc: Oral     SpO2: 100% 96% 99%   Weight: 244 lb 14.9 oz (111.1 kg)     Height: 6' (1.829 m)         The patient's condition in the ED was stable, the patient was afebrile and nontoxic in appearance, and the patient's physical exam was unremarkable other than for the tenderness noted above. He was given IV fluids and medications for pain and nausea in the ED. Lab work-up revealed a white blood cell count of 11.5, normal lactate. Metabolic panel essentially normal, including a creatinine of 1.0. CT scan with IV contrast of the abdomen and pelvis is concerning for a small bowel obstruction.   I consulted the general surgeon on-call, Dr. Leroy Haynes, who agreed to accept and admit the patient. NG tube was placed in the ED. PROCEDURES:  None    FINAL IMPRESSION      1. SBO (small bowel obstruction) ([facility]          DISPOSITION/PLAN   DISPOSITION Decision To Admit 04/03/2020 06:01:04 PM      PATIENT REFERRED TO:  No follow-up provider specified.     DISCHARGE MEDICATIONS:  New Prescriptions    No medications on file       (Please note that portions of this note were completed with a voice recognition program.  Efforts were made to edit the dictations but occasionally words are mis-transcribed.)    Cash Ross, [address] [place] [address],[address], [address] [place] [address]  04/03/20 9748

## 2020-04-03 NOTE — ED NOTES
Pt denies change in pain, pain 9/10, pt sitting in bed no S.s of distress call light in reach pt on continuous monitor      Matti Delaney RN  04/03/20 [id number]

## 2020-04-03 NOTE — ED NOTES
Report called, pain is 7/10 no s.s of distress noted at d/c reports given to [address] [place] [address] Blvd, RN  04/03/20 [id number] [facility] Ajit Myles RN  04/03/20 0575

## 2020-04-04 ENCOUNTER — APPOINTMENT (OUTPATIENT)
Dept: GENERAL RADIOLOGY | Age: 50
DRG: 390 | End: 2020-04-04
Payer: COMMERCIAL

## 2020-04-04 LAB
ANION GAP SERPL CALCULATED.3IONS-SCNC: 12 MMOL/L (ref 3–16)
BASOPHILS ABSOLUTE: 0.2 K/UL (ref 0–0.2)
BASOPHILS RELATIVE PERCENT: 2.8 %
BUN BLDV-MCNC: 7 MG/DL (ref 7–20)
CALCIUM SERPL-MCNC: 8.9 MG/DL (ref 8.3–10.6)
CHLORIDE BLD-SCNC: 102 MMOL/L (ref 99–110)
CO2: 24 MMOL/L (ref 21–32)
CREAT SERPL-MCNC: 1 MG/DL (ref 0.9–1.3)
EOSINOPHILS ABSOLUTE: 0.2 K/UL (ref 0–0.6)
EOSINOPHILS RELATIVE PERCENT: 3 %
GFR AFRICAN AMERICAN: >60
GFR NON-AFRICAN AMERICAN: >60
GLUCOSE BLD-MCNC: 95 MG/DL (ref 70–99)
HCT VFR BLD CALC: 42.8 % (ref 40.5–52.5)
HEMOGLOBIN: 13.8 G/DL (ref 13.5–17.5)
LYMPHOCYTES ABSOLUTE: 1.4 K/UL (ref 1–5.1)
LYMPHOCYTES RELATIVE PERCENT: 17.7 %
MCH RBC QN AUTO: 28.2 PG (ref 26–34)
MCHC RBC AUTO-ENTMCNC: 32.1 G/DL (ref 31–36)
MCV RBC AUTO: 87.8 FL (ref 80–100)
MONOCYTES ABSOLUTE: 0.5 K/UL (ref 0–1.3)
MONOCYTES RELATIVE PERCENT: 6.8 %
NEUTROPHILS ABSOLUTE: 5.4 K/UL (ref 1.7–7.7)
NEUTROPHILS RELATIVE PERCENT: 69.7 %
PDW BLD-RTO: 14.9 % (ref 12.4–15.4)
PLATELET # BLD: 355 K/UL (ref 135–450)
PMV BLD AUTO: 9.4 FL (ref 5–10.5)
POTASSIUM REFLEX MAGNESIUM: 4.3 MMOL/L (ref 3.5–5.1)
RBC # BLD: 4.87 M/UL (ref 4.2–5.9)
SODIUM BLD-SCNC: 138 MMOL/L (ref 136–145)
WBC # BLD: 7.8 K/UL (ref 4–11)

## 2020-04-04 PROCEDURE — 99223 1ST HOSP IP/OBS HIGH 75: CPT | Performed by: SURGERY

## 2020-04-04 PROCEDURE — 6360000002 HC RX W HCPCS: Performed by: SURGERY

## 2020-04-04 PROCEDURE — C9113 INJ PANTOPRAZOLE SODIUM, VIA: HCPCS | Performed by: SURGERY

## 2020-04-04 PROCEDURE — 36415 COLL VENOUS BLD VENIPUNCTURE: CPT

## 2020-04-04 PROCEDURE — 2580000003 HC RX 258: Performed by: SURGERY

## 2020-04-04 PROCEDURE — 80048 BASIC METABOLIC PNL TOTAL CA: CPT

## 2020-04-04 PROCEDURE — 1200000000 HC SEMI PRIVATE

## 2020-04-04 PROCEDURE — 85025 COMPLETE CBC W/AUTO DIFF WBC: CPT

## 2020-04-04 PROCEDURE — 74019 RADEX ABDOMEN 2 VIEWS: CPT

## 2020-04-04 RX ADMIN — ENOXAPARIN SODIUM 40 MG: 40 INJECTION SUBCUTANEOUS at 21:47

## 2020-04-04 RX ADMIN — HYDROMORPHONE HYDROCHLORIDE 0.5 MG: 1 INJECTION, SOLUTION INTRAMUSCULAR; INTRAVENOUS; SUBCUTANEOUS at 09:05

## 2020-04-04 RX ADMIN — SODIUM CHLORIDE, PRESERVATIVE FREE 10 ML: 5 INJECTION INTRAVENOUS at 21:49

## 2020-04-04 RX ADMIN — HYDROMORPHONE HYDROCHLORIDE 0.5 MG: 1 INJECTION, SOLUTION INTRAMUSCULAR; INTRAVENOUS; SUBCUTANEOUS at 18:44

## 2020-04-04 RX ADMIN — ONDANSETRON HYDROCHLORIDE 4 MG: 2 SOLUTION INTRAMUSCULAR; INTRAVENOUS at 13:02

## 2020-04-04 RX ADMIN — SODIUM CHLORIDE 10 ML: 9 INJECTION INTRAMUSCULAR; INTRAVENOUS; SUBCUTANEOUS at 11:14

## 2020-04-04 RX ADMIN — HYDROMORPHONE HYDROCHLORIDE 0.5 MG: 1 INJECTION, SOLUTION INTRAMUSCULAR; INTRAVENOUS; SUBCUTANEOUS at 04:44

## 2020-04-04 RX ADMIN — ONDANSETRON HYDROCHLORIDE 4 MG: 2 SOLUTION INTRAMUSCULAR; INTRAVENOUS at 18:44

## 2020-04-04 RX ADMIN — PANTOPRAZOLE SODIUM 40 MG: 40 INJECTION, POWDER, FOR SOLUTION INTRAVENOUS at 09:05

## 2020-04-04 RX ADMIN — ONDANSETRON HYDROCHLORIDE 4 MG: 2 SOLUTION INTRAMUSCULAR; INTRAVENOUS at 07:48

## 2020-04-04 RX ADMIN — HYDROMORPHONE HYDROCHLORIDE 0.5 MG: 1 INJECTION, SOLUTION INTRAMUSCULAR; INTRAVENOUS; SUBCUTANEOUS at 01:00

## 2020-04-04 RX ADMIN — SODIUM CHLORIDE: 9 INJECTION, SOLUTION INTRAVENOUS at 20:32

## 2020-04-04 RX ADMIN — HYDROMORPHONE HYDROCHLORIDE 0.5 MG: 1 INJECTION, SOLUTION INTRAMUSCULAR; INTRAVENOUS; SUBCUTANEOUS at 21:47

## 2020-04-04 RX ADMIN — HYDROMORPHONE HYDROCHLORIDE 0.5 MG: 1 INJECTION, SOLUTION INTRAMUSCULAR; INTRAVENOUS; SUBCUTANEOUS at 12:51

## 2020-04-04 ASSESSMENT — PAIN DESCRIPTION - ONSET
ONSET: ON-GOING
ONSET: GRADUAL

## 2020-04-04 ASSESSMENT — PAIN DESCRIPTION - PAIN TYPE
TYPE: ACUTE PAIN

## 2020-04-04 ASSESSMENT — PAIN DESCRIPTION - DESCRIPTORS
DESCRIPTORS: THROBBING
DESCRIPTORS: CRAMPING;THROBBING
DESCRIPTORS: ACHING;CRAMPING
DESCRIPTORS: THROBBING
DESCRIPTORS: THROBBING
DESCRIPTORS: ACHING
DESCRIPTORS: ACHING;CRAMPING
DESCRIPTORS: THROBBING

## 2020-04-04 ASSESSMENT — PAIN DESCRIPTION - PROGRESSION
CLINICAL_PROGRESSION: GRADUALLY IMPROVING
CLINICAL_PROGRESSION: NOT CHANGED
CLINICAL_PROGRESSION: GRADUALLY IMPROVING
CLINICAL_PROGRESSION: NOT CHANGED
CLINICAL_PROGRESSION: GRADUALLY WORSENING

## 2020-04-04 ASSESSMENT — PAIN SCALES - GENERAL
PAINLEVEL_OUTOF10: 9
PAINLEVEL_OUTOF10: 8
PAINLEVEL_OUTOF10: 6
PAINLEVEL_OUTOF10: 7

## 2020-04-04 ASSESSMENT — PAIN - FUNCTIONAL ASSESSMENT
PAIN_FUNCTIONAL_ASSESSMENT: PREVENTS OR INTERFERES SOME ACTIVE ACTIVITIES AND ADLS

## 2020-04-04 ASSESSMENT — PAIN SCALES - WONG BAKER
WONGBAKER_NUMERICALRESPONSE: 0

## 2020-04-04 ASSESSMENT — PAIN DESCRIPTION - FREQUENCY
FREQUENCY: CONTINUOUS

## 2020-04-04 ASSESSMENT — PAIN DESCRIPTION - LOCATION
LOCATION: ABDOMEN

## 2020-04-04 ASSESSMENT — PAIN DESCRIPTION - ORIENTATION
ORIENTATION: RIGHT;LEFT
ORIENTATION: MID
ORIENTATION: MID
ORIENTATION: LEFT;MID
ORIENTATION: RIGHT;LEFT;MID
ORIENTATION: RIGHT;LEFT;MID
ORIENTATION: MID
ORIENTATION: MID

## 2020-04-04 NOTE — H&P
retching yesterday. Passing some flatus but no BM. Feels about the same since admission. Chronic narcotic use due to arthritis. Prior to Admission medications    Medication Sig Start Date End Date Taking? Authorizing Provider   cyclobenzaprine (FLEXERIL) 10 MG tablet Take 1 tablet by mouth nightly as needed for Muscle spasms 4/2/20 4/12/20 Yes JEMAL Lara   oxyCODONE (ROXICODONE) 5 MG immediate release tablet Take 1 tablet by mouth every 6 hours as needed for Pain for up to 7 days. 3/31/20 4/7/20 Yes Steph Nash MD   acetaminophen (TYLENOL) 500 MG tablet Take 2 tablets by mouth 3 times daily (with meals) 3/25/20  Yes Steve Farrell PA-C   traZODone (DESYREL) 100 MG tablet Take 2 tablets by mouth nightly 3/11/20  Yes Hiwot Ewing MD   zolpidem (AMBIEN) 10 MG tablet Take 1 tablet by mouth nightly as needed (1 po hs) for up to 90 days. 3/9/20 6/7/20 Yes Jacqui Mcconnell APRN - CNP   FLUoxetine (PROZAC) 20 MG capsule TAKE 1 CAPSULE BY MOUTH DAILY 3/2/20  Yes Franky Marie MD   pantoprazole (PROTONIX) 40 MG tablet Take 1 tablet by mouth 2 times daily 2/28/20  Yes Franky Marie MD   atorvastatin (LIPITOR) 40 MG tablet TAKE 1 TABLET BY MOUTH EVERY NIGHT AT BEDTIME 12/10/19  Yes Franky Marie MD   calcium-vitamin D (OSCAL 500/200 D-3) 500-200 MG-UNIT per tablet Take 1 tablet by mouth 2 times daily    Yes Historical Provider, MD   Multiple Vitamin TABS Take 1 tablet by mouth daily    Yes Historical Provider, MD   aspirin EC 81 MG EC tablet Take 1 tablet by mouth 2 times daily for 14 days Please avoid missing doses.  2/19/20 3/4/20  LILLIE Nevarez - CNP   dicyclomine (BENTYL) 20 MG tablet Take 40 mg by mouth 2 times daily as needed 12/1/19   Historical Provider, MD   sildenafil (REVATIO) 20 MG tablet Take 4 tablets by mouth as needed (30 min prior to intercourse) 6/12/19   Franky Marie MD     Past Medical History:   Diagnosis Date    Alcoholism ([address]     last drink 2015    Allergic migraine with status migrainosus     Allergic rhinitis, seasonal     Anemia     Arthritis     right knee    Vaughn's esophagus     Benign intracranial hypertension     Cancer (HCC)     renal     Carpal tunnel syndrome     Chronic pain     Depression     Depression     GERD (gastroesophageal reflux disease)     Headache(784.0)     History of blood transfusion     History of tobacco use     Quit 7/2014    Migraine     chronic for 1 year    Morbid obesity ([address]     Movement disorder     Onychomycosis     Sleep apnea, obstructive     Severe uses cpap stop bang 6    Unspecified cerebral artery occlusion with cerebral infarction 2014    slight weakness in left arm    Wears dentures     full set    Wears glasses      Past Surgical History:   Procedure Laterality Date    ABDOMEN SURGERY      gastric bypass    ABDOMEN SURGERY  4-7-2016    repair of recurrent incisional hernia with mesh, removal of old mesh, bilateral component separation    ABDOMINAL EXPLORATION SURGERY  1/11/16    exp lap, lysis of adhesions, small bowel resection    CARPAL TUNNEL RELEASE      bilat    DILATATION, ESOPHAGUS      ENDOSCOPY, COLON, DIAGNOSTIC      EYE SURGERY      GASTRIC BYPASS SURGERY  Jan 2009    Has lost about 200 pounds.     HERNIA REPAIR  3-    ventral    JOINT REPLACEMENT      KIDNEY REMOVAL Left 08/01/2018    [facility]     KNEE ARTHROSCOPY Right 7/11/12    RIGHT KNEE ARTHROSCOPY WITH CHONDROPLASTY    KNEE SURGERY  July 2012    right, arthroscopy    KNEE SURGERY Right 2/18/2020    INCISION AND DRAINAGE RIGHT KNEE AND WOULD VAC PLACEMENT performed by Ann-Marie Gotti MD at [address]  2005    OTHER SURGICAL HISTORY Left 03/08/2016    CT biopsy ablasion left kidney    OTHER SURGICAL HISTORY  2016    small intestine 12 inch removed, 2 hernia surgeries, another surgery to drain infection from incision.  REVISION TOTAL KNEE ARTHROPLASTY Right 2019    RIGHT REVISION TIBIA TOTAL KNEE REPLACEMENT performed by Meme Leslie MD at [address] [facility] Right 2020    RIGHT KNEE IRRIGATION AND DEBRIDEMENT WITH POLY EXCHANGE performed by Kell Chen MD at [address] [facility]  10/15/13    RIGHT    UPPER GASTROINTESTINAL ENDOSCOPY  2016    UPPER GASTROINTESTINAL ENDOSCOPY  2018     Family History   Problem Relation Age of Onset    Cancer Father         Lymphoma    Arthritis Mother     Dementia Other     Diabetes Other     Cancer Other     Diabetes Maternal Uncle     Heart Disease Maternal Uncle     Depression Other     Heart Disease Maternal Uncle      Social History     Socioeconomic History    Marital status:      Spouse name: Miguel Roberts Number of children: 2    Years of education: Not on file    Highest education level: Not on file   Occupational History    Occupation: Xylogenicsian, 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    Social Needs    Financial resource strain: Not on file    Food insecurity     Worry: Not on file     Inability: Not on file    Transportation needs     Medical: Not on file     Non-medical: Not on file   Tobacco Use    Smoking status: Former Smoker     Packs/day: 0.50     Years: 25.00     Pack years: 12.50     Types: Cigarettes     Last attempt to quit: 2017     Years since quittin.6    Smokeless tobacco: Never Used   Substance and Sexual Activity    Alcohol use: No     Alcohol/week: 0.0 standard drinks     Comment: last drink     Drug use: No    Sexual activity: Yes     Partners: Female     Comment: wife Jigar Gathers   Lifestyle    Physical activity     Days per week: Not on file     Minutes per session: Not on file    Stress: Not on file   Relationships    Social connections     Talks on phone: Not on file     Gets together: Not on file     Attends

## 2020-04-04 NOTE — PROGRESS NOTES
Pt arrived to floor from ER at [address] [place] [address] via stretcher. Pt oriented to room, call light, policies and procedures, the menu and ordering. Call light within reach. Bed in lowest position, bed alarm on, and wheels locked. Pt verbalized understanding. No complaints, questions or concerns at this time.

## 2020-04-05 ENCOUNTER — APPOINTMENT (OUTPATIENT)
Dept: GENERAL RADIOLOGY | Age: 50
DRG: 390 | End: 2020-04-05
Payer: COMMERCIAL

## 2020-04-05 LAB
ANION GAP SERPL CALCULATED.3IONS-SCNC: 12 MMOL/L (ref 3–16)
BUN BLDV-MCNC: 10 MG/DL (ref 7–20)
CALCIUM SERPL-MCNC: 8.6 MG/DL (ref 8.3–10.6)
CHLORIDE BLD-SCNC: 106 MMOL/L (ref 99–110)
CO2: 22 MMOL/L (ref 21–32)
CREAT SERPL-MCNC: 0.8 MG/DL (ref 0.9–1.3)
GFR AFRICAN AMERICAN: >60
GFR NON-AFRICAN AMERICAN: >60
GLUCOSE BLD-MCNC: 81 MG/DL (ref 70–99)
HCT VFR BLD CALC: 39.2 % (ref 40.5–52.5)
HEMOGLOBIN: 12.6 G/DL (ref 13.5–17.5)
MCH RBC QN AUTO: 28 PG (ref 26–34)
MCHC RBC AUTO-ENTMCNC: 32.1 G/DL (ref 31–36)
MCV RBC AUTO: 87.3 FL (ref 80–100)
PDW BLD-RTO: 14.7 % (ref 12.4–15.4)
PLATELET # BLD: 357 K/UL (ref 135–450)
PMV BLD AUTO: 8.3 FL (ref 5–10.5)
POTASSIUM SERPL-SCNC: 4.3 MMOL/L (ref 3.5–5.1)
RBC # BLD: 4.48 M/UL (ref 4.2–5.9)
SODIUM BLD-SCNC: 140 MMOL/L (ref 136–145)
WBC # BLD: 9.8 K/UL (ref 4–11)

## 2020-04-05 PROCEDURE — 94760 N-INVAS EAR/PLS OXIMETRY 1: CPT

## 2020-04-05 PROCEDURE — 36415 COLL VENOUS BLD VENIPUNCTURE: CPT

## 2020-04-05 PROCEDURE — 85027 COMPLETE CBC AUTOMATED: CPT

## 2020-04-05 PROCEDURE — 2580000003 HC RX 258: Performed by: SURGERY

## 2020-04-05 PROCEDURE — 74019 RADEX ABDOMEN 2 VIEWS: CPT

## 2020-04-05 PROCEDURE — C9113 INJ PANTOPRAZOLE SODIUM, VIA: HCPCS | Performed by: SURGERY

## 2020-04-05 PROCEDURE — 6360000002 HC RX W HCPCS: Performed by: SURGERY

## 2020-04-05 PROCEDURE — 99232 SBSQ HOSP IP/OBS MODERATE 35: CPT | Performed by: SURGERY

## 2020-04-05 PROCEDURE — 6370000000 HC RX 637 (ALT 250 FOR IP): Performed by: SURGERY

## 2020-04-05 PROCEDURE — 80048 BASIC METABOLIC PNL TOTAL CA: CPT

## 2020-04-05 PROCEDURE — 1200000000 HC SEMI PRIVATE

## 2020-04-05 RX ADMIN — HYDROMORPHONE HYDROCHLORIDE 0.5 MG: 1 INJECTION, SOLUTION INTRAMUSCULAR; INTRAVENOUS; SUBCUTANEOUS at 10:45

## 2020-04-05 RX ADMIN — ONDANSETRON HYDROCHLORIDE 4 MG: 2 SOLUTION INTRAMUSCULAR; INTRAVENOUS at 01:09

## 2020-04-05 RX ADMIN — HYDROMORPHONE HYDROCHLORIDE 0.5 MG: 1 INJECTION, SOLUTION INTRAMUSCULAR; INTRAVENOUS; SUBCUTANEOUS at 06:34

## 2020-04-05 RX ADMIN — SODIUM CHLORIDE, PRESERVATIVE FREE 10 ML: 5 INJECTION INTRAVENOUS at 10:19

## 2020-04-05 RX ADMIN — HYDROMORPHONE HYDROCHLORIDE 0.5 MG: 1 INJECTION, SOLUTION INTRAMUSCULAR; INTRAVENOUS; SUBCUTANEOUS at 21:09

## 2020-04-05 RX ADMIN — SODIUM CHLORIDE, PRESERVATIVE FREE 10 ML: 5 INJECTION INTRAVENOUS at 21:10

## 2020-04-05 RX ADMIN — SODIUM CHLORIDE: 9 INJECTION, SOLUTION INTRAVENOUS at 14:54

## 2020-04-05 RX ADMIN — SODIUM CHLORIDE 10 ML: 9 INJECTION INTRAMUSCULAR; INTRAVENOUS; SUBCUTANEOUS at 10:21

## 2020-04-05 RX ADMIN — HYDROMORPHONE HYDROCHLORIDE 0.5 MG: 1 INJECTION, SOLUTION INTRAMUSCULAR; INTRAVENOUS; SUBCUTANEOUS at 01:10

## 2020-04-05 RX ADMIN — SODIUM CHLORIDE: 9 INJECTION, SOLUTION INTRAVENOUS at 06:39

## 2020-04-05 RX ADMIN — HYDROMORPHONE HYDROCHLORIDE 0.5 MG: 1 INJECTION, SOLUTION INTRAMUSCULAR; INTRAVENOUS; SUBCUTANEOUS at 17:55

## 2020-04-05 RX ADMIN — ZOLPIDEM TARTRATE 10 MG: 5 TABLET ORAL at 21:09

## 2020-04-05 RX ADMIN — ONDANSETRON HYDROCHLORIDE 4 MG: 2 SOLUTION INTRAMUSCULAR; INTRAVENOUS at 17:26

## 2020-04-05 RX ADMIN — ENOXAPARIN SODIUM 40 MG: 40 INJECTION SUBCUTANEOUS at 21:10

## 2020-04-05 RX ADMIN — HYDROMORPHONE HYDROCHLORIDE 0.5 MG: 1 INJECTION, SOLUTION INTRAMUSCULAR; INTRAVENOUS; SUBCUTANEOUS at 14:54

## 2020-04-05 RX ADMIN — TRAZODONE HYDROCHLORIDE 200 MG: 100 TABLET ORAL at 21:10

## 2020-04-05 RX ADMIN — ONDANSETRON HYDROCHLORIDE 4 MG: 2 SOLUTION INTRAMUSCULAR; INTRAVENOUS at 10:45

## 2020-04-05 RX ADMIN — PANTOPRAZOLE SODIUM 40 MG: 40 INJECTION, POWDER, FOR SOLUTION INTRAVENOUS at 10:45

## 2020-04-05 ASSESSMENT — PAIN DESCRIPTION - DESCRIPTORS
DESCRIPTORS: ACHING
DESCRIPTORS: ACHING
DESCRIPTORS: ACHING;CONSTANT
DESCRIPTORS: ACHING
DESCRIPTORS: ACHING;CONSTANT
DESCRIPTORS: ACHING
DESCRIPTORS: ACHING;CRAMPING
DESCRIPTORS: ACHING;CONSTANT
DESCRIPTORS: ACHING

## 2020-04-05 ASSESSMENT — PAIN DESCRIPTION - LOCATION
LOCATION: ABDOMEN

## 2020-04-05 ASSESSMENT — PAIN DESCRIPTION - ONSET
ONSET: ON-GOING
ONSET: PROGRESSIVE
ONSET: ON-GOING
ONSET: GRADUAL
ONSET: ON-GOING
ONSET: GRADUAL
ONSET: ON-GOING

## 2020-04-05 ASSESSMENT — PAIN DESCRIPTION - PAIN TYPE
TYPE: ACUTE PAIN

## 2020-04-05 ASSESSMENT — PAIN DESCRIPTION - ORIENTATION
ORIENTATION: RIGHT;LEFT;MID
ORIENTATION: MID
ORIENTATION: RIGHT;LEFT;MID
ORIENTATION: MID
ORIENTATION: RIGHT;LEFT;MID
ORIENTATION: MID
ORIENTATION: RIGHT;LEFT;MID

## 2020-04-05 ASSESSMENT — PAIN SCALES - GENERAL
PAINLEVEL_OUTOF10: 8
PAINLEVEL_OUTOF10: 8
PAINLEVEL_OUTOF10: 6
PAINLEVEL_OUTOF10: 3
PAINLEVEL_OUTOF10: 8
PAINLEVEL_OUTOF10: 5
PAINLEVEL_OUTOF10: 8
PAINLEVEL_OUTOF10: 6
PAINLEVEL_OUTOF10: 6
PAINLEVEL_OUTOF10: 8
PAINLEVEL_OUTOF10: 4
PAINLEVEL_OUTOF10: 8

## 2020-04-05 ASSESSMENT — PAIN SCALES - WONG BAKER
WONGBAKER_NUMERICALRESPONSE: 4
WONGBAKER_NUMERICALRESPONSE: 4
WONGBAKER_NUMERICALRESPONSE: 8
WONGBAKER_NUMERICALRESPONSE: 4
WONGBAKER_NUMERICALRESPONSE: 8
WONGBAKER_NUMERICALRESPONSE: 8

## 2020-04-05 ASSESSMENT — PAIN DESCRIPTION - PROGRESSION
CLINICAL_PROGRESSION: NOT CHANGED
CLINICAL_PROGRESSION: GRADUALLY IMPROVING
CLINICAL_PROGRESSION: GRADUALLY IMPROVING
CLINICAL_PROGRESSION: GRADUALLY WORSENING
CLINICAL_PROGRESSION: GRADUALLY WORSENING
CLINICAL_PROGRESSION: NOT CHANGED
CLINICAL_PROGRESSION: GRADUALLY IMPROVING
CLINICAL_PROGRESSION: GRADUALLY WORSENING
CLINICAL_PROGRESSION: NOT CHANGED

## 2020-04-05 ASSESSMENT — PAIN - FUNCTIONAL ASSESSMENT
PAIN_FUNCTIONAL_ASSESSMENT: PREVENTS OR INTERFERES SOME ACTIVE ACTIVITIES AND ADLS
PAIN_FUNCTIONAL_ASSESSMENT: PREVENTS OR INTERFERES SOME ACTIVE ACTIVITIES AND ADLS
PAIN_FUNCTIONAL_ASSESSMENT: PREVENTS OR INTERFERES WITH MANY ACTIVE NOT PASSIVE ACTIVITIES
PAIN_FUNCTIONAL_ASSESSMENT: PREVENTS OR INTERFERES SOME ACTIVE ACTIVITIES AND ADLS
PAIN_FUNCTIONAL_ASSESSMENT: PREVENTS OR INTERFERES WITH MANY ACTIVE NOT PASSIVE ACTIVITIES
PAIN_FUNCTIONAL_ASSESSMENT: PREVENTS OR INTERFERES SOME ACTIVE ACTIVITIES AND ADLS

## 2020-04-05 ASSESSMENT — PAIN DESCRIPTION - FREQUENCY
FREQUENCY: CONTINUOUS

## 2020-04-06 ENCOUNTER — APPOINTMENT (OUTPATIENT)
Dept: GENERAL RADIOLOGY | Age: 50
DRG: 390 | End: 2020-04-06
Payer: COMMERCIAL

## 2020-04-06 LAB
ANION GAP SERPL CALCULATED.3IONS-SCNC: 15 MMOL/L (ref 3–16)
BUN BLDV-MCNC: 9 MG/DL (ref 7–20)
CALCIUM SERPL-MCNC: 8.6 MG/DL (ref 8.3–10.6)
CHLORIDE BLD-SCNC: 102 MMOL/L (ref 99–110)
CO2: 23 MMOL/L (ref 21–32)
CREAT SERPL-MCNC: 0.9 MG/DL (ref 0.9–1.3)
GFR AFRICAN AMERICAN: >60
GFR NON-AFRICAN AMERICAN: >60
GLUCOSE BLD-MCNC: 63 MG/DL (ref 70–99)
HCT VFR BLD CALC: 37.4 % (ref 40.5–52.5)
HEMOGLOBIN: 12.2 G/DL (ref 13.5–17.5)
MCH RBC QN AUTO: 28.4 PG (ref 26–34)
MCHC RBC AUTO-ENTMCNC: 32.6 G/DL (ref 31–36)
MCV RBC AUTO: 87.1 FL (ref 80–100)
PDW BLD-RTO: 14.8 % (ref 12.4–15.4)
PLATELET # BLD: 316 K/UL (ref 135–450)
PMV BLD AUTO: 8.9 FL (ref 5–10.5)
POTASSIUM SERPL-SCNC: 4.2 MMOL/L (ref 3.5–5.1)
RBC # BLD: 4.29 M/UL (ref 4.2–5.9)
SODIUM BLD-SCNC: 140 MMOL/L (ref 136–145)
WBC # BLD: 9.8 K/UL (ref 4–11)

## 2020-04-06 PROCEDURE — 80048 BASIC METABOLIC PNL TOTAL CA: CPT

## 2020-04-06 PROCEDURE — APPNB30 APP NON BILLABLE TIME 0-30 MINS: Performed by: PHYSICIAN ASSISTANT

## 2020-04-06 PROCEDURE — 94760 N-INVAS EAR/PLS OXIMETRY 1: CPT

## 2020-04-06 PROCEDURE — 6360000002 HC RX W HCPCS: Performed by: SURGERY

## 2020-04-06 PROCEDURE — C9113 INJ PANTOPRAZOLE SODIUM, VIA: HCPCS | Performed by: SURGERY

## 2020-04-06 PROCEDURE — 99232 SBSQ HOSP IP/OBS MODERATE 35: CPT | Performed by: SURGERY

## 2020-04-06 PROCEDURE — 36415 COLL VENOUS BLD VENIPUNCTURE: CPT

## 2020-04-06 PROCEDURE — 2580000003 HC RX 258: Performed by: SURGERY

## 2020-04-06 PROCEDURE — 85027 COMPLETE CBC AUTOMATED: CPT

## 2020-04-06 PROCEDURE — 1200000000 HC SEMI PRIVATE

## 2020-04-06 PROCEDURE — 6370000000 HC RX 637 (ALT 250 FOR IP): Performed by: SURGERY

## 2020-04-06 PROCEDURE — APPSS15 APP SPLIT SHARED TIME 0-15 MINUTES: Performed by: PHYSICIAN ASSISTANT

## 2020-04-06 PROCEDURE — 74250 X-RAY XM SM INT 1CNTRST STD: CPT

## 2020-04-06 RX ORDER — POLYETHYLENE GLYCOL 3350 17 G/17G
17 POWDER, FOR SOLUTION ORAL DAILY
Status: DISCONTINUED | OUTPATIENT
Start: 2020-04-06 | End: 2020-04-07 | Stop reason: HOSPADM

## 2020-04-06 RX ADMIN — PANTOPRAZOLE SODIUM 40 MG: 40 INJECTION, POWDER, FOR SOLUTION INTRAVENOUS at 08:11

## 2020-04-06 RX ADMIN — HYDROMORPHONE HYDROCHLORIDE 0.5 MG: 1 INJECTION, SOLUTION INTRAMUSCULAR; INTRAVENOUS; SUBCUTANEOUS at 11:22

## 2020-04-06 RX ADMIN — HYDROMORPHONE HYDROCHLORIDE 0.5 MG: 1 INJECTION, SOLUTION INTRAMUSCULAR; INTRAVENOUS; SUBCUTANEOUS at 20:35

## 2020-04-06 RX ADMIN — HYDROMORPHONE HYDROCHLORIDE 0.5 MG: 1 INJECTION, SOLUTION INTRAMUSCULAR; INTRAVENOUS; SUBCUTANEOUS at 00:27

## 2020-04-06 RX ADMIN — SODIUM CHLORIDE, PRESERVATIVE FREE 10 ML: 5 INJECTION INTRAVENOUS at 08:11

## 2020-04-06 RX ADMIN — TRAZODONE HYDROCHLORIDE 200 MG: 100 TABLET ORAL at 20:35

## 2020-04-06 RX ADMIN — ENOXAPARIN SODIUM 40 MG: 40 INJECTION SUBCUTANEOUS at 20:35

## 2020-04-06 RX ADMIN — SODIUM CHLORIDE: 9 INJECTION, SOLUTION INTRAVENOUS at 00:31

## 2020-04-06 RX ADMIN — SODIUM CHLORIDE: 9 INJECTION, SOLUTION INTRAVENOUS at 08:52

## 2020-04-06 RX ADMIN — ZOLPIDEM TARTRATE 10 MG: 5 TABLET ORAL at 21:14

## 2020-04-06 RX ADMIN — HYDROMORPHONE HYDROCHLORIDE 0.5 MG: 1 INJECTION, SOLUTION INTRAMUSCULAR; INTRAVENOUS; SUBCUTANEOUS at 23:59

## 2020-04-06 RX ADMIN — HYDROMORPHONE HYDROCHLORIDE 0.5 MG: 1 INJECTION, SOLUTION INTRAMUSCULAR; INTRAVENOUS; SUBCUTANEOUS at 08:11

## 2020-04-06 RX ADMIN — HYDROMORPHONE HYDROCHLORIDE 0.5 MG: 1 INJECTION, SOLUTION INTRAMUSCULAR; INTRAVENOUS; SUBCUTANEOUS at 17:25

## 2020-04-06 RX ADMIN — SODIUM CHLORIDE: 9 INJECTION, SOLUTION INTRAVENOUS at 21:09

## 2020-04-06 RX ADMIN — HYDROMORPHONE HYDROCHLORIDE 0.5 MG: 1 INJECTION, SOLUTION INTRAMUSCULAR; INTRAVENOUS; SUBCUTANEOUS at 14:20

## 2020-04-06 RX ADMIN — HYDROMORPHONE HYDROCHLORIDE 0.5 MG: 1 INJECTION, SOLUTION INTRAMUSCULAR; INTRAVENOUS; SUBCUTANEOUS at 04:45

## 2020-04-06 RX ADMIN — ONDANSETRON HYDROCHLORIDE 4 MG: 2 SOLUTION INTRAMUSCULAR; INTRAVENOUS at 04:45

## 2020-04-06 RX ADMIN — SODIUM CHLORIDE, PRESERVATIVE FREE 10 ML: 5 INJECTION INTRAVENOUS at 11:24

## 2020-04-06 ASSESSMENT — PAIN DESCRIPTION - PROGRESSION
CLINICAL_PROGRESSION: GRADUALLY IMPROVING
CLINICAL_PROGRESSION: GRADUALLY WORSENING
CLINICAL_PROGRESSION: GRADUALLY IMPROVING
CLINICAL_PROGRESSION: GRADUALLY WORSENING
CLINICAL_PROGRESSION: GRADUALLY IMPROVING
CLINICAL_PROGRESSION: GRADUALLY IMPROVING
CLINICAL_PROGRESSION: GRADUALLY WORSENING
CLINICAL_PROGRESSION: NOT CHANGED
CLINICAL_PROGRESSION: GRADUALLY WORSENING
CLINICAL_PROGRESSION: GRADUALLY WORSENING
CLINICAL_PROGRESSION: GRADUALLY IMPROVING
CLINICAL_PROGRESSION: GRADUALLY WORSENING
CLINICAL_PROGRESSION: GRADUALLY IMPROVING
CLINICAL_PROGRESSION: GRADUALLY IMPROVING
CLINICAL_PROGRESSION: GRADUALLY WORSENING

## 2020-04-06 ASSESSMENT — PAIN SCALES - GENERAL
PAINLEVEL_OUTOF10: 9
PAINLEVEL_OUTOF10: 8
PAINLEVEL_OUTOF10: 6
PAINLEVEL_OUTOF10: 9
PAINLEVEL_OUTOF10: 8
PAINLEVEL_OUTOF10: 6
PAINLEVEL_OUTOF10: 8

## 2020-04-06 ASSESSMENT — PAIN DESCRIPTION - LOCATION
LOCATION: ABDOMEN

## 2020-04-06 ASSESSMENT — PAIN DESCRIPTION - ORIENTATION
ORIENTATION: MID;RIGHT;LEFT
ORIENTATION: MID;LEFT;RIGHT
ORIENTATION: RIGHT;LEFT;MID
ORIENTATION: MID;RIGHT;LEFT
ORIENTATION: MID;RIGHT;LEFT
ORIENTATION: MID
ORIENTATION: RIGHT;LEFT;MID
ORIENTATION: MID;RIGHT;LEFT

## 2020-04-06 ASSESSMENT — PAIN DESCRIPTION - DESCRIPTORS
DESCRIPTORS: ACHING
DESCRIPTORS: STABBING;THROBBING;SHARP
DESCRIPTORS: ACHING

## 2020-04-06 ASSESSMENT — PAIN DESCRIPTION - FREQUENCY
FREQUENCY: CONTINUOUS

## 2020-04-06 ASSESSMENT — PAIN DESCRIPTION - ONSET
ONSET: ON-GOING
ONSET: GRADUAL
ONSET: ON-GOING
ONSET: GRADUAL
ONSET: ON-GOING

## 2020-04-06 ASSESSMENT — PAIN DESCRIPTION - PAIN TYPE
TYPE: ACUTE PAIN

## 2020-04-06 ASSESSMENT — PAIN - FUNCTIONAL ASSESSMENT
PAIN_FUNCTIONAL_ASSESSMENT: PREVENTS OR INTERFERES SOME ACTIVE ACTIVITIES AND ADLS
PAIN_FUNCTIONAL_ASSESSMENT: ACTIVITIES ARE NOT PREVENTED
PAIN_FUNCTIONAL_ASSESSMENT: PREVENTS OR INTERFERES SOME ACTIVE ACTIVITIES AND ADLS

## 2020-04-06 NOTE — PROGRESS NOTES
Went to testing in x-ray to give him pain medication, unable to scan the medications as they had no working computer in the room.

## 2020-04-06 NOTE — CARE COORDINATION
INITIAL CASE MANAGEMENT ASSESSMENT    Reviewed chart, met with patient to assess possible discharge needs. Explained Case Management role/services. Living Situation: lives at home with his spouse and plans to return there at discharge    ADLs: independent     DME: wound VAC at home    PT/OT Recs: not ordered     Active Services: [facility] for home wound VAC     Transportation: active --spouse can transport home at [place]     Medications: no issues--uses [facility] pharmacy    PCP: Dr Tay Talbert MD      PLAN/COMMENTS: patient plans to return home with his wife-- active with [facility] for his home wound VAC--notified Dia with [facility]    Patient states he is awaiting wound care RN for plan w/ VAC    SW/CM provided contact information for patient or family to call with any questions. SW/CM will follow and assist as needed.   Electronically signed by Aspen Rojas on 4/6/2020 at 12:27 PM
T84.012A    Infection of total knee replacement ([facility] T84.59XA, Z96.659    History of depression Z86.59    History of alcoholism ([facility] F10.21    Receiving intravenous antibiotic treatment as outpatient Z79.2    SBO (small bowel obstruction) ([facility] K56.609    Small bowel obstruction ([facility] K56.609       Measurements:  Wound 04/06/20 Knee Right right knee (Active)   Wound Image   4/6/2020  1:35 PM   Wound Non-Healing Surgical 4/6/2020  1:35 PM   Dressing Status Changed 4/6/2020  1:35 PM   Dressing Changed Changed/New 4/6/2020  1:35 PM   Dressing/Treatment Silicone border;Silver dressing 4/6/2020  1:35 PM   Wound Cleansed Rinsed/Irrigated with saline 4/6/2020  1:35 PM   Dressing Change Due 04/09/20 4/6/2020  1:35 PM   Wound Length (cm) 12 cm 4/6/2020  1:35 PM   Wound Width (cm) 3 cm 4/6/2020  1:35 PM   Wound Depth (cm) 0.1 cm 4/6/2020  1:35 PM   Wound Surface Area (cm^2) 36 cm^2 4/6/2020  1:35 PM   Wound Volume (cm^3) 3.6 cm^3 4/6/2020  1:35 PM   Wound Assessment Yellow;Red;Hyper granulation tissue 4/6/2020  1:35 PM   Drainage Amount Moderate 4/6/2020  1:35 PM   Drainage Description Serosanguinous 4/6/2020  1:35 PM   Odor None 4/6/2020  1:35 PM   Margins Attached edges 4/6/2020  1:35 PM   Mila-wound Assessment Dry; Intact 4/6/2020  1:35 PM   Non-staged Wound Description Full thickness 4/6/2020  1:35 PM   Red%Wound Bed 70 4/6/2020  1:35 PM   Yellow%Wound Bed 30 4/6/2020  1:35 PM   Number of days: 0     Incision Right; Anterior (Active)   Number of days:          Response to treatment:  Well tolerated by patient. Pain Assessment:  Severity:  0 / 10  Quality of pain: N/A  Wound Pain Timing/Severity: none  Premedicated: N/A    Plan   Plan of Care: [REMOVED] Incision 12/17/19 Knee Anterior;Right-Dressing/Treatment: Moist to dry, Silicone border  Wound 04/06/20 Knee Right right knee-Dressing/Treatment: Silicone border, Silver dressing     Asked to see pt to evaluate right knee wound. See above for wound descriptions.

## 2020-04-06 NOTE — PROGRESS NOTES
[P.O.:200; I.V.:1412; NG/GT:90]  Out: 2350 [Urine:2350]  I/O this shift: In: 472 [I.V.:472]  Out: -     LABS  CBC:   Lab Results   Component Value Date    WBC 9.8 04/06/2020    RBC 4.29 04/06/2020    RBC 4.94 01/07/2015    HGB 12.2 04/06/2020    HCT 37.4 04/06/2020    MCV 87.1 04/06/2020    MCH 28.4 04/06/2020    MCHC 32.6 04/06/2020    RDW 14.8 04/06/2020     04/06/2020    MPV 8.9 04/06/2020     BMP:    Lab Results   Component Value Date     04/06/2020    K 4.2 04/06/2020    K 4.3 04/04/2020     04/06/2020    CO2 23 04/06/2020    BUN 9 04/06/2020    LABALBU 4.0 04/03/2020    CREATININE 0.9 04/06/2020    CALCIUM 8.6 04/06/2020    GFRAA >60 04/06/2020    LABGLOM >60 04/06/2020    GLUCOSE 63 04/06/2020    GLUCOSE 84 01/07/2015         Antonella Pugh PA-C  Electronically signed 4/6/2020 at 10:14 AM      Surgery Staff  I have examined this patient and read and agree with the note by Lloyd Mix PA-C from today. No change. Continues to c/o pain. Minimal flatus  Abdomen tender centrally without peritonitis  Labs ok  SBFT appears normal    Resolving SBO  D/C NG tube and start clears.   Will add miralax due to opioid induced constipation  Electronically signed by Celia Larkin MD on 4/6/2020 at 11:55 AM

## 2020-04-06 NOTE — PROGRESS NOTES
Pt resting in bed this evening, consistently rates abdominal pain 8/10, & constant nausea, given prescribed antiemetics and analgesics (see eMAR). Pt had abd xray, and scheduled for SBFT tomorrow. Pt tolerating NPO with ice chips & popsicles. NGT remains to wall suction, tolerating well, minimal output over this day. Pt wife called and given updates. No questions or concerns, will continue to monitor.  Electronically signed by Aidan Castañeda RN on 4/5/2020 at 8:22 PM

## 2020-04-07 ENCOUNTER — OFFICE VISIT (OUTPATIENT)
Dept: PSYCHIATRY | Age: 50
End: 2020-04-07
Payer: COMMERCIAL

## 2020-04-07 VITALS
OXYGEN SATURATION: 94 % | HEIGHT: 72 IN | RESPIRATION RATE: 16 BRPM | HEART RATE: 57 BPM | BODY MASS INDEX: 32.94 KG/M2 | DIASTOLIC BLOOD PRESSURE: 66 MMHG | SYSTOLIC BLOOD PRESSURE: 106 MMHG | TEMPERATURE: 98.3 F | WEIGHT: 243.17 LBS

## 2020-04-07 PROCEDURE — APPSS15 APP SPLIT SHARED TIME 0-15 MINUTES: Performed by: PHYSICIAN ASSISTANT

## 2020-04-07 PROCEDURE — 94760 N-INVAS EAR/PLS OXIMETRY 1: CPT

## 2020-04-07 PROCEDURE — 6370000000 HC RX 637 (ALT 250 FOR IP): Performed by: PHYSICIAN ASSISTANT

## 2020-04-07 PROCEDURE — 2580000003 HC RX 258: Performed by: SURGERY

## 2020-04-07 PROCEDURE — 99232 SBSQ HOSP IP/OBS MODERATE 35: CPT | Performed by: SURGERY

## 2020-04-07 PROCEDURE — APPNB30 APP NON BILLABLE TIME 0-30 MINS: Performed by: PHYSICIAN ASSISTANT

## 2020-04-07 PROCEDURE — 6360000002 HC RX W HCPCS: Performed by: SURGERY

## 2020-04-07 PROCEDURE — C9113 INJ PANTOPRAZOLE SODIUM, VIA: HCPCS | Performed by: SURGERY

## 2020-04-07 PROCEDURE — 99441 PR PHYS/QHP TELEPHONE EVALUATION 5-10 MIN: CPT | Performed by: PSYCHIATRY & NEUROLOGY

## 2020-04-07 PROCEDURE — 99024 POSTOP FOLLOW-UP VISIT: CPT | Performed by: PHYSICIAN ASSISTANT

## 2020-04-07 RX ORDER — OXYCODONE HYDROCHLORIDE AND ACETAMINOPHEN 5; 325 MG/1; MG/1
1 TABLET ORAL EVERY 6 HOURS PRN
Status: DISCONTINUED | OUTPATIENT
Start: 2020-04-07 | End: 2020-04-07 | Stop reason: HOSPADM

## 2020-04-07 RX ORDER — FLUOXETINE HYDROCHLORIDE 20 MG/1
20 CAPSULE ORAL DAILY
Qty: 90 CAPSULE | Refills: 1 | Status: SHIPPED | OUTPATIENT
Start: 2020-04-07 | End: 2020-10-08 | Stop reason: SDUPTHER

## 2020-04-07 RX ORDER — TRAZODONE HYDROCHLORIDE 100 MG/1
200 TABLET ORAL NIGHTLY
Qty: 180 TABLET | Refills: 1 | Status: SHIPPED | OUTPATIENT
Start: 2020-04-07 | End: 2020-09-24 | Stop reason: SDUPTHER

## 2020-04-07 RX ADMIN — SODIUM CHLORIDE: 9 INJECTION, SOLUTION INTRAVENOUS at 06:18

## 2020-04-07 RX ADMIN — PANTOPRAZOLE SODIUM 40 MG: 40 INJECTION, POWDER, FOR SOLUTION INTRAVENOUS at 07:48

## 2020-04-07 RX ADMIN — HYDROMORPHONE HYDROCHLORIDE 0.5 MG: 1 INJECTION, SOLUTION INTRAMUSCULAR; INTRAVENOUS; SUBCUTANEOUS at 07:48

## 2020-04-07 RX ADMIN — HYDROMORPHONE HYDROCHLORIDE 0.5 MG: 1 INJECTION, SOLUTION INTRAMUSCULAR; INTRAVENOUS; SUBCUTANEOUS at 04:16

## 2020-04-07 RX ADMIN — OXYCODONE HYDROCHLORIDE AND ACETAMINOPHEN 1 TABLET: 5; 325 TABLET ORAL at 11:08

## 2020-04-07 RX ADMIN — SODIUM CHLORIDE 10 ML: 9 INJECTION INTRAMUSCULAR; INTRAVENOUS; SUBCUTANEOUS at 08:10

## 2020-04-07 RX ADMIN — SODIUM CHLORIDE, PRESERVATIVE FREE 10 ML: 5 INJECTION INTRAVENOUS at 08:10

## 2020-04-07 ASSESSMENT — PAIN SCALES - GENERAL
PAINLEVEL_OUTOF10: 8
PAINLEVEL_OUTOF10: 6
PAINLEVEL_OUTOF10: 8
PAINLEVEL_OUTOF10: 0
PAINLEVEL_OUTOF10: 5
PAINLEVEL_OUTOF10: 7
PAINLEVEL_OUTOF10: 0

## 2020-04-07 ASSESSMENT — PAIN DESCRIPTION - PAIN TYPE
TYPE: ACUTE PAIN

## 2020-04-07 ASSESSMENT — PAIN DESCRIPTION - FREQUENCY
FREQUENCY: CONTINUOUS

## 2020-04-07 ASSESSMENT — PAIN DESCRIPTION - LOCATION
LOCATION: ABDOMEN

## 2020-04-07 ASSESSMENT — PAIN DESCRIPTION - DESCRIPTORS
DESCRIPTORS: ACHING

## 2020-04-07 ASSESSMENT — PAIN DESCRIPTION - ORIENTATION
ORIENTATION: RIGHT;LEFT;MID
ORIENTATION: MID
ORIENTATION: RIGHT;LEFT;MID
ORIENTATION: MID
ORIENTATION: RIGHT;LEFT;MID

## 2020-04-07 ASSESSMENT — PAIN DESCRIPTION - PROGRESSION
CLINICAL_PROGRESSION: GRADUALLY IMPROVING
CLINICAL_PROGRESSION: NOT CHANGED
CLINICAL_PROGRESSION: GRADUALLY IMPROVING
CLINICAL_PROGRESSION: GRADUALLY WORSENING
CLINICAL_PROGRESSION: GRADUALLY WORSENING

## 2020-04-07 ASSESSMENT — PAIN - FUNCTIONAL ASSESSMENT
PAIN_FUNCTIONAL_ASSESSMENT: ACTIVITIES ARE NOT PREVENTED
PAIN_FUNCTIONAL_ASSESSMENT: ACTIVITIES ARE NOT PREVENTED
PAIN_FUNCTIONAL_ASSESSMENT: PREVENTS OR INTERFERES SOME ACTIVE ACTIVITIES AND ADLS

## 2020-04-07 ASSESSMENT — PAIN DESCRIPTION - ONSET
ONSET: ON-GOING

## 2020-04-07 NOTE — PROGRESS NOTES
Pt resting in bed. A&Ox4. Pt c/o abdominal pain throughout night, dilaudid given q3 per orders. No c/o nausea/vomiting. Call light and bedside table within reach.

## 2020-04-07 NOTE — PROGRESS NOTES
Data- discharge order received, pt verbalized agreement to discharge, disposition to previous residence, no needs for HHC/DME. Action- discharge instructions prepared and given to patient, pt verbalized understanding. Medication information packet given r/t NEW and/or CHANGED prescriptions emphasizing name/purpose/side effects, pt verbalized understanding. Discharge instruction summary: Diet- general, Activity- UAL, Primary Care Physician as follows: Manolo Jara -[phone] f/u appointment as needed, immunizations reviewed and are up to date, no prescription medications ordered. Response- Pt belongings gathered, IV removed. Disposition is home (no HHC/DME needs), pt left unit independently with belongings, no complications.

## 2020-04-07 NOTE — PROGRESS NOTES
Time: 04/04/20  5:41 AM   Result Value Ref Range    WBC 7.8 4.0 - 11.0 K/uL    RBC 4.87 4.20 - 5.90 M/uL    Hemoglobin 13.8 13.5 - 17.5 g/dL    Hematocrit 42.8 40.5 - 52.5 %    MCV 87.8 80.0 - 100.0 fL    MCH 28.2 26.0 - 34.0 pg    MCHC 32.1 31.0 - 36.0 g/dL    RDW 14.9 12.4 - 15.4 %    Platelets 428 324 - 347 K/uL    MPV 9.4 5.0 - 10.5 fL    Neutrophils % 69.7 %    Lymphocytes % 17.7 %    Monocytes % 6.8 %    Eosinophils % 3.0 %    Basophils % 2.8 %    Neutrophils Absolute 5.4 1.7 - 7.7 K/uL    Lymphocytes Absolute 1.4 1.0 - 5.1 K/uL    Monocytes Absolute 0.5 0.0 - 1.3 K/uL    Eosinophils Absolute 0.2 0.0 - 0.6 K/uL    Basophils Absolute 0.2 0.0 - 0.2 K/uL   Basic Metabolic Panel w/ Reflex to MG    Collection Time: 04/04/20  5:41 AM   Result Value Ref Range    Sodium 138 136 - 145 mmol/L    Potassium reflex Magnesium 4.3 3.5 - 5.1 mmol/L    Chloride 102 99 - 110 mmol/L    CO2 24 21 - 32 mmol/L    Anion Gap 12 3 - 16    Glucose 95 70 - 99 mg/dL    BUN 7 7 - 20 mg/dL    CREATININE 1.0 0.9 - 1.3 mg/dL    GFR Non-African American >60 >60    GFR African American >60 >60    Calcium 8.9 8.3 - 10.6 mg/dL   Basic Metabolic Panel    Collection Time: 04/05/20  7:30 AM   Result Value Ref Range    Sodium 140 136 - 145 mmol/L    Potassium 4.3 3.5 - 5.1 mmol/L    Chloride 106 99 - 110 mmol/L    CO2 22 21 - 32 mmol/L    Anion Gap 12 3 - 16    Glucose 81 70 - 99 mg/dL    BUN 10 7 - 20 mg/dL    CREATININE 0.8 (L) 0.9 - 1.3 mg/dL    GFR Non-African American >60 >60    GFR African American >60 >60    Calcium 8.6 8.3 - 10.6 mg/dL   CBC    Collection Time: 04/05/20  7:30 AM   Result Value Ref Range    WBC 9.8 4.0 - 11.0 K/uL    RBC 4.48 4.20 - 5.90 M/uL    Hemoglobin 12.6 (L) 13.5 - 17.5 g/dL    Hematocrit 39.2 (L) 40.5 - 52.5 %    MCV 87.3 80.0 - 100.0 fL    MCH 28.0 26.0 - 34.0 pg    MCHC 32.1 31.0 - 36.0 g/dL    RDW 14.7 12.4 - 15.4 %    Platelets 278 457 - 230 K/uL    MPV 8.3 5.0 - 10.5 fL   CBC    Collection Time: 04/06/20  4:41 AM

## 2020-04-07 NOTE — DISCHARGE INSTR - COC
Continuity of Care Form    Patient Name: Cristela Linares   :  1970  MRN:  [id number]    Admit date:  4/3/2020  Discharge date:  20    Code Status Order: Full Code   Advance Directives:   [facility] Documentation     Date/Time Healthcare Directive Type of Healthcare Directive Copy in [address] Agent's Name Healthcare Agent's Phone Number    20 [phone]  Yes, patient has an advance directive for healthcare treatment  Health care treatment directive; Living will  Yes, copy in chart  Healthcare power of   Марина Lemus  [phone]          Admitting Physician:  Cory Schneider MD  PCP: Michele Vergara MD    [facility] O1T-4870/3119-01  Discharging Unit Phone Number: [phone]    Emergency Contact:   Extended Emergency Contact Information  Primary Emergency Contact: [facility]  Address: [address] Phone: [phone]  Mobile Phone: [phone]  Relation: Spouse  Secondary Emergency Contact: [address] Phone: [phone]  Relation: Parent    Past Surgical History:  Past Surgical History:   Procedure Laterality Date    ABDOMEN SURGERY      gastric bypass    ABDOMEN SURGERY  2016    repair of recurrent incisional hernia with mesh, removal of old mesh, bilateral component separation    ABDOMINAL EXPLORATION SURGERY  16    exp lap, lysis of adhesions, small bowel resection    CARPAL TUNNEL RELEASE      bilat    DILATATION, ESOPHAGUS      ENDOSCOPY, COLON, DIAGNOSTIC      EYE SURGERY      GASTRIC BYPASS SURGERY  2009    Has lost about 200 pounds.     HERNIA REPAIR  3-    ventral    JOINT REPLACEMENT      KIDNEY REMOVAL Left 2018    KNEE ARTHROSCOPY Right 2013    Dr.Robert Sanders     KNEE ARTHROSCOPY Right 12    RIGHT KNEE ARTHROSCOPY WITH CHONDROPLASTY    KNEE Thin Liquids  Daily Fluid Restriction: no  Last Modified Barium Swallow with Video (Video Swallowing Test): not done    Treatments at the Time of Hospital Discharge:   Respiratory Treatments: ***  Oxygen Therapy:  is not on home oxygen therapy. Ventilator:    - No ventilator support    Rehab Therapies: Nurse  Weight Bearing Status/Restrictions: No weight bearing restirctions  Other Medical Equipment (for information only, NOT a DME order):  ***  Other Treatments:  Right knee wound:  Cleanse wound with saline and pat dry. Apply silver post-op dressing to right knee-change every 3 days and PRN. This will absorb drainage, promoting moist wound healing and deaning down hypergranulation as well as applying antimicrobial action to wound site. Change every 3 days and as needed for strike-through drainage. Pt needs to follow up with ortho MD on discharge. Please call for worsening.       Patient's personal belongings (please select all that are sent with patient):  Glasses, Dentures upper and lower    RN SIGNATURE:  Electronically signed by Aniya Roque RN on 4/7/20 at 10:31 AM EDT    CASE MANAGEMENT/SOCIAL WORK SECTION    Inpatient Status Date: 4/3/2020    Readmission Risk Assessment Score:  26    Discharging to Facility/ Agency   Name:  [facility] care    Address: [address] [place] [address]., [address] [place] [address]  Phone: [phone]  Fax: [phone]    / signature: Electronically signed by Mackenzie Lanza on 4/7/20 at 10:03 AM EDT    PHYSICIAN SECTION    Prognosis: Good    Condition at Discharge: Stable    Rehab Potential (if transferring to Rehab): Good    Physician Certification: I certify the above information and transfer of Danyelle Kraus  is necessary for the continuing treatment of the diagnosis listed and that he requires [address] for less 30 days.      Update Admission H&P: No change in H&P    PHYSICIAN SIGNATURE:  Electronically signed by JEMAL Ariza on 4/7/20

## 2020-04-07 NOTE — PROGRESS NOTES
General and Vascular Surgery                                                           Daily Progress Note                                                                 Pt Name: Raquel [facility] Record Number: [id number]  Date of Birth 1970   Today's Date: 4/7/2020      ASSESSMENT/PLAN  1. SBO  -+flatulence and BMs  -abdomen nondistended. Mild tenderness R abdomen, tenderness improving  -Leukocytosis resolved  -Clear liquids tolerated will advance to general diet as tolerated  -labs ok  -Abdominal xray's (4/5/20) AM: showed improved appearance of the bowel gas pattern with decreased size of the loops of bowel in the LUQ  -SBFT (4/6/20) Unremarkable small bowel follow through series. -OK to D/C home    [address] [place] [address] has improved . He has no nausea and no vomiting He has passed flatus and has  had a bowel movement. He is tolerating ice clear liquids. Current activity is up with assistance    OBJECTIVE  VITALS:  height is 6' (1.829 m) and weight is 243 lb 2.7 oz (110.3 kg). His oral temperature is 98.3 °F (36.8 °C). His blood pressure is 106/66 and his pulse is 57. His respiration is 16 and oxygen saturation is 94%. VITALS:  /66   Pulse 57   Temp 98.3 °F (36.8 °C) (Oral)   Resp 16   Ht 6' (1.829 m)   Wt 243 lb 2.7 oz (110.3 kg)   SpO2 94%   BMI 32.98 kg/m²   INTAKE/OUTPUT:      Intake/Output Summary (Last 24 hours) at 4/7/2020 0947  Last data filed at 4/7/2020 0618  Gross per 24 hour   Intake 1250 ml   Output 50 ml   Net 1200 ml     GENERAL: alert, no distress  LUNGS: clear to ausculation, without wheezes, rales or rhonci  HEART: normal rate and regular rhythm  ABDOMEN: soft, ND. Tender R abdomen  EXTREMITY: no cyanosis, clubbing or edema  I/O last 3 completed shifts: In: 0563 [P.O.:240; I.V.:1482]  Out: 50 [Emesis/NG output:50]  No intake/output data recorded.     LABS  CBC:   Lab Results   Component Value Date    WBC 9.8 04/06/2020    RBC 4.29 04/06/2020    RBC 4.94 01/07/2015    HGB 12.2 04/06/2020    HCT 37.4 04/06/2020    MCV 87.1 04/06/2020    MCH 28.4 04/06/2020    MCHC 32.6 04/06/2020    RDW 14.8 04/06/2020     04/06/2020    MPV 8.9 04/06/2020     BMP:    Lab Results   Component Value Date     04/06/2020    K 4.2 04/06/2020    K 4.3 04/04/2020     04/06/2020    CO2 23 04/06/2020    BUN 9 04/06/2020    LABALBU 4.0 04/03/2020    CREATININE 0.9 04/06/2020    CALCIUM 8.6 04/06/2020    GFRAA >60 04/06/2020    LABGLOM >60 04/06/2020    GLUCOSE 63 04/06/2020    GLUCOSE 84 01/07/2015         Aminta Friedman PA-C  Electronically signed 4/7/2020 at 9:47 AM      Surgery Staff  I have examined this patient and read and agree with the note by Kalyn Jon PA-C from today. Multiple BMs. Still mild cramping but tolerating PO  Abdomen nontender    Resolved SBO  Diet as tolerated.   D/C home this afternoon if tolerates PO  Electronically signed by Aria Kraus MD on 4/7/2020 at 11:30 AM

## 2020-04-07 NOTE — PLAN OF CARE
Problem: Pain:  Goal: Pain level will decrease  Description: Pain level will decrease  Outcome: Met This Shift  Note: Patient has been assessed for pain on a regular bases, patient has been given pain medication as appropriate, patient has been reassessed for pain after medication has been administered   Goal: Control of acute pain  Description: Control of acute pain  Outcome: Met This Shift  Note: Patient is alert and oriented X4. Patient is able to identify the sensation of pain and communicate the need for pain medication appropriately. Goal: Control of chronic pain  Description: Control of chronic pain  Outcome: Met This Shift  Note: Patient is not having chronic pain      Problem: Falls - Risk of:  Goal: Will remain free from falls  Description: Will remain free from falls  Outcome: Met This Shift  Note: No falls noted this shift. Patient ambulates with x1 staff assist and his cane. Bed kept in low position. Safe environment maintained. Bedside table & call light in reach. Uses call light appropriately when needing assistance. Goal: Absence of physical injury  Description: Absence of physical injury  Outcome: Met This Shift  Note: No physical injury noted to the patient this shift. Patient has been assessed for fall risk, fall precautions are in place, environment has been cleared of obstacles and reassessed during rounding, bed is in lowest position with the wheels locked, call light is within reach.  ID band has been verified, appropriate transfer methods have been utilized and patient has been educated on safety devices      Problem: Nutrition  Goal: Optimal nutrition therapy  Outcome: Ongoing  Note: Patient has been started on clear diet this shift after the removal of the NG tube, he did tolerate the clears  well, denies nausea/vomiting at this time
Problem: Pain:  Goal: Pain level will decrease  Description: Pain level will decrease  Outcome: Ongoing  Note: Pain /discomfort being managed with PRN analgesics per MD orders. Somewhat effective but ongoing. Patient able to express presence and absence of pain and rate pain appropriately using numerical scale. Goal: Control of acute pain  Description: Control of acute pain  Outcome: Ongoing  Note: Pain /discomfort being managed with PRN analgesics per MD orders. Somewhat effective but ongoing. Patient able to express presence and absence of pain and rate pain appropriately using numerical scale. Goal: Control of chronic pain  Description: Control of chronic pain  Outcome: Met This Shift  Note: No c/o of chronic pain per this shift. Problem: Falls - Risk of:  Goal: Will remain free from falls  Description: Will remain free from falls  Outcome: Met This Shift  Note: Patient educated on fall prevention. Call light is within reach, bed locked in lowest position, personal items within reach, and bed alarm is on. Will round on patient per unit guidelines. Goal: Absence of physical injury  Description: Absence of physical injury  Outcome: Met This Shift  Note: Pt is free of injury. No injury noted. Fall precautions in place. Call light within reach. Will monitor.
Problem: Pain:  Goal: Pain level will decrease  Description: Pain level will decrease  Outcome: Ongoing  Note: Pt educated to attempt non-phagological method of pain control, but it it becomes too strong use PRN analgesics. Pain and discomfort being managed PRN analgesics per MD orders. Pt able to express presence of pain. Problem: Pain:  Goal: Control of acute pain  Description: Control of acute pain  Outcome: Ongoing  Note: Patient educated on acute pain. Taught patient to use call light to ask for pain medication. PRN pain medication given for acute pain. Will continue to monitor pain per unit protocol. Problem: Pain:  Goal: Control of chronic pain  Description: Control of chronic pain  Outcome: Ongoing  Note: Patient educated on chronic pain. Taught patient to use call light to ask for pain medication. PRN pain medication given for chronic pain. Will continue to monitor pain per unit protocol. Problem: Falls - Risk of:  Goal: Will remain free from falls  Description: Will remain free from falls  Outcome: Ongoing  Note: Fall risk assessment completed . Fall precautions in place, bed/ chair alarm on, side rails 2/4 up, call light in reach, educated pt on calling for assistance when needed, room clear of clutter. Pt verbalized understanding. Problem: Falls - Risk of:  Goal: Absence of physical injury  Description: Absence of physical injury  Outcome: Ongoing  Note: Fall risk assessment completed . Fall precautions in place, bed/ chair alarm on, side rails 2/4 up, call light in reach, educated pt on calling for assistance when needed, room clear of clutter. Pt verbalized understanding. Problem: Nutrition  Goal: Optimal nutrition therapy  4/4/2020 2304 by Chilango Calvert RN  Outcome: Ongoing  Note: Pt NPO at this time. Pt has NG tube in place. No tube feedings at this time.
clutter. Will continue to monitor. 4/6/2020 1343 by Magan Whitlock RN  Outcome: Met This Shift  Note: No falls noted this shift. Patient ambulates with x1 staff assist and his cane. Bed kept in low position. Safe environment maintained. Bedside table & call light in reach. Uses call light appropriately when needing assistance. Goal: Absence of physical injury  Description: Absence of physical injury  4/6/2020 2157 by Koko Krause RN  Outcome: Ongoing  Note: Pt is free of injury. No injury noted. Fall precautions in place. Call light within reach. Will monitor. 4/6/2020 1343 by Magan Whitlock RN  Outcome: Met This Shift  Note: No physical injury noted to the patient this shift. Patient has been assessed for fall risk, fall precautions are in place, environment has been cleared of obstacles and reassessed during rounding, bed is in lowest position with the wheels locked, call light is within reach. ID band has been verified, appropriate transfer methods have been utilized and patient has been educated on safety devices      Problem: Nutrition  Goal: Optimal nutrition therapy  4/6/2020 2157 by Koko Krause RN  Outcome: Ongoing  Note: Patient's nutrition is adequate, patient had % of breakfast and lunch, patient feeds self, adequate time for meals, intake is being monitored. Will cont to monitor and reassess.     4/6/2020 1343 by Magan Whitlock RN  Outcome: Ongoing  Note: Patient has been started on clear diet this shift after the removal of the NG tube, he did tolerate the clears  well, denies nausea/vomiting at this time
nutrition therapy  4/7/2020 0809 by Mayra Holguin, RN  Outcome: Ongoing  4/6/2020 2157 by Herberth Segura RN  Outcome: Ongoing  Note: Patient's nutrition is adequate, patient had % of breakfast and lunch, patient feeds self, adequate time for meals, intake is being monitored. Will cont to monitor and reassess.

## 2020-04-08 ENCOUNTER — CARE COORDINATION (OUTPATIENT)
Dept: CASE MANAGEMENT | Age: 50
End: 2020-04-08

## 2020-04-08 NOTE — CARE COORDINATION
[facility] Transitions Initial Follow Up Call    Call within 2 business days of discharge: Yes    Patient: Delmer Hartman Patient : 1970   MRN: [id number]  Reason for Admission: SBO  Discharge Date: 20 RARS: Readmission Risk Score: 26      Last Discharge [facility]       Complaint Diagnosis Description Type Department Provider    4/3/20 Nausea; Abdominal Pain SBO (small bowel obstruction) [facility]) ED to Hosp-Admission (Discharged) (ADMITTED) [facility] 3W Cindy Banegas MD; Raza Rizo MD           Spoke with: [facility]: [facility]    Non-face-to-face services provided:  Obtained and reviewed discharge summary and/or continuity of care documents  Communication with home health agencies or other community services the patient is currently [facility]    Care Transitions 24 Hour Call    Do you have all of your prescriptions and are they filled?:  Yes  Care Transitions Interventions     Attempted to reach patient via phone for initial post hospital transition call. VM left stating purpose of call along with my contact information requesting a return call. Writer called [facility] intake and spoke with Prakash Early she could not find referral.      Writer then called [facility] liaison and left message to return call. [facility] liaison returned call and stated they had the referral and it was a resumption.       Follow Up  Future Appointments   Date Time Provider Timothy Beal   2020  8:00 AM MD KATIE Bowen ORTHO DAR   2020 12:00 PM Jacqui Matos, LILLIE - CNP FF SLEEP MED [facility]       Qian Viveros RN

## 2020-04-09 ENCOUNTER — TELEPHONE (OUTPATIENT)
Dept: SURGERY | Age: 50
End: 2020-04-09

## 2020-04-09 ENCOUNTER — CARE COORDINATION (OUTPATIENT)
Dept: CASE MANAGEMENT | Age: 50
End: 2020-04-09

## 2020-04-10 ENCOUNTER — CARE COORDINATION (OUTPATIENT)
Dept: CASE MANAGEMENT | Age: 50
End: 2020-04-10

## 2020-04-11 ENCOUNTER — HOSPITAL ENCOUNTER (INPATIENT)
Age: 50
LOS: 5 days | Discharge: HOME HEALTH | DRG: 390 | End: 2020-04-16
Attending: EMERGENCY MEDICINE | Admitting: SURGERY
Payer: COMMERCIAL

## 2020-04-11 ENCOUNTER — APPOINTMENT (OUTPATIENT)
Dept: GENERAL RADIOLOGY | Age: 50
DRG: 390 | End: 2020-04-11
Payer: COMMERCIAL

## 2020-04-11 ENCOUNTER — APPOINTMENT (OUTPATIENT)
Dept: CT IMAGING | Age: 50
DRG: 390 | End: 2020-04-11
Payer: COMMERCIAL

## 2020-04-11 LAB
A/G RATIO: 1.3 (ref 1.1–2.2)
ALBUMIN SERPL-MCNC: 3.9 G/DL (ref 3.4–5)
ALP BLD-CCNC: 152 U/L (ref 40–129)
ALT SERPL-CCNC: 10 U/L (ref 10–40)
ANION GAP SERPL CALCULATED.3IONS-SCNC: 14 MMOL/L (ref 3–16)
AST SERPL-CCNC: 16 U/L (ref 15–37)
BASOPHILS ABSOLUTE: 0.1 K/UL (ref 0–0.2)
BASOPHILS RELATIVE PERCENT: 0.8 %
BILIRUB SERPL-MCNC: 0.3 MG/DL (ref 0–1)
BILIRUBIN URINE: NEGATIVE
BLOOD, URINE: NEGATIVE
BUN BLDV-MCNC: 9 MG/DL (ref 7–20)
CALCIUM SERPL-MCNC: 9.3 MG/DL (ref 8.3–10.6)
CHLORIDE BLD-SCNC: 100 MMOL/L (ref 99–110)
CLARITY: ABNORMAL
CO2: 23 MMOL/L (ref 21–32)
COLOR: YELLOW
CREAT SERPL-MCNC: 0.9 MG/DL (ref 0.9–1.3)
EOSINOPHILS ABSOLUTE: 0 K/UL (ref 0–0.6)
EOSINOPHILS RELATIVE PERCENT: 0.7 %
GFR AFRICAN AMERICAN: >60
GFR NON-AFRICAN AMERICAN: >60
GLOBULIN: 3.1 G/DL
GLUCOSE BLD-MCNC: 123 MG/DL (ref 70–99)
GLUCOSE URINE: NEGATIVE MG/DL
HCT VFR BLD CALC: 42.3 % (ref 40.5–52.5)
HEMOGLOBIN: 13.6 G/DL (ref 13.5–17.5)
KETONES, URINE: NEGATIVE MG/DL
LEUKOCYTE ESTERASE, URINE: NEGATIVE
LIPASE: 15 U/L (ref 13–60)
LYMPHOCYTES ABSOLUTE: 1 K/UL (ref 1–5.1)
LYMPHOCYTES RELATIVE PERCENT: 14.3 %
MCH RBC QN AUTO: 27.9 PG (ref 26–34)
MCHC RBC AUTO-ENTMCNC: 32.3 G/DL (ref 31–36)
MCV RBC AUTO: 86.5 FL (ref 80–100)
MICROSCOPIC EXAMINATION: ABNORMAL
MONOCYTES ABSOLUTE: 0.2 K/UL (ref 0–1.3)
MONOCYTES RELATIVE PERCENT: 2.3 %
NEUTROPHILS ABSOLUTE: 5.9 K/UL (ref 1.7–7.7)
NEUTROPHILS RELATIVE PERCENT: 81.9 %
NITRITE, URINE: NEGATIVE
PDW BLD-RTO: 15.4 % (ref 12.4–15.4)
PH UA: 7.5 (ref 5–8)
PLATELET # BLD: 379 K/UL (ref 135–450)
PMV BLD AUTO: 8.6 FL (ref 5–10.5)
POTASSIUM REFLEX MAGNESIUM: 4.8 MMOL/L (ref 3.5–5.1)
PROTEIN UA: NEGATIVE MG/DL
RBC # BLD: 4.89 M/UL (ref 4.2–5.9)
SODIUM BLD-SCNC: 137 MMOL/L (ref 136–145)
SPECIFIC GRAVITY UA: 1.02 (ref 1–1.03)
TOTAL PROTEIN: 7 G/DL (ref 6.4–8.2)
TROPONIN: <0.01 NG/ML
URINE REFLEX TO CULTURE: ABNORMAL
URINE TYPE: ABNORMAL
UROBILINOGEN, URINE: 1 E.U./DL
WBC # BLD: 7.3 K/UL (ref 4–11)

## 2020-04-11 PROCEDURE — 71045 X-RAY EXAM CHEST 1 VIEW: CPT

## 2020-04-11 PROCEDURE — 83690 ASSAY OF LIPASE: CPT

## 2020-04-11 PROCEDURE — 93005 ELECTROCARDIOGRAM TRACING: CPT | Performed by: NURSE PRACTITIONER

## 2020-04-11 PROCEDURE — 2580000003 HC RX 258: Performed by: NURSE PRACTITIONER

## 2020-04-11 PROCEDURE — 2580000003 HC RX 258: Performed by: SURGERY

## 2020-04-11 PROCEDURE — 6370000000 HC RX 637 (ALT 250 FOR IP): Performed by: SURGERY

## 2020-04-11 PROCEDURE — 1200000000 HC SEMI PRIVATE

## 2020-04-11 PROCEDURE — 94760 N-INVAS EAR/PLS OXIMETRY 1: CPT

## 2020-04-11 PROCEDURE — 74177 CT ABD & PELVIS W/CONTRAST: CPT

## 2020-04-11 PROCEDURE — 96376 TX/PRO/DX INJ SAME DRUG ADON: CPT

## 2020-04-11 PROCEDURE — 85025 COMPLETE CBC W/AUTO DIFF WBC: CPT

## 2020-04-11 PROCEDURE — 6360000002 HC RX W HCPCS: Performed by: EMERGENCY MEDICINE

## 2020-04-11 PROCEDURE — 96375 TX/PRO/DX INJ NEW DRUG ADDON: CPT

## 2020-04-11 PROCEDURE — 6360000002 HC RX W HCPCS: Performed by: SURGERY

## 2020-04-11 PROCEDURE — 0D9670Z DRAINAGE OF STOMACH WITH DRAINAGE DEVICE, VIA NATURAL OR ARTIFICIAL OPENING: ICD-10-PCS | Performed by: SURGERY

## 2020-04-11 PROCEDURE — 84484 ASSAY OF TROPONIN QUANT: CPT

## 2020-04-11 PROCEDURE — C9113 INJ PANTOPRAZOLE SODIUM, VIA: HCPCS | Performed by: SURGERY

## 2020-04-11 PROCEDURE — 96374 THER/PROPH/DIAG INJ IV PUSH: CPT

## 2020-04-11 PROCEDURE — 99285 EMERGENCY DEPT VISIT HI MDM: CPT

## 2020-04-11 PROCEDURE — 81003 URINALYSIS AUTO W/O SCOPE: CPT

## 2020-04-11 PROCEDURE — 6360000004 HC RX CONTRAST MEDICATION: Performed by: NURSE PRACTITIONER

## 2020-04-11 PROCEDURE — 99221 1ST HOSP IP/OBS SF/LOW 40: CPT | Performed by: SURGERY

## 2020-04-11 PROCEDURE — 80053 COMPREHEN METABOLIC PANEL: CPT

## 2020-04-11 PROCEDURE — 6360000002 HC RX W HCPCS: Performed by: NURSE PRACTITIONER

## 2020-04-11 RX ORDER — ZOLPIDEM TARTRATE 5 MG/1
10 TABLET ORAL NIGHTLY PRN
Status: DISCONTINUED | OUTPATIENT
Start: 2020-04-11 | End: 2020-04-16 | Stop reason: HOSPADM

## 2020-04-11 RX ORDER — POLYETHYLENE GLYCOL 3350 17 G/17G
17 POWDER, FOR SOLUTION ORAL DAILY PRN
Status: DISCONTINUED | OUTPATIENT
Start: 2020-04-11 | End: 2020-04-16 | Stop reason: HOSPADM

## 2020-04-11 RX ORDER — SODIUM CHLORIDE 9 MG/ML
10 INJECTION INTRAVENOUS DAILY
Status: DISCONTINUED | OUTPATIENT
Start: 2020-04-11 | End: 2020-04-16 | Stop reason: HOSPADM

## 2020-04-11 RX ORDER — OXYCODONE HYDROCHLORIDE 5 MG/1
5 TABLET ORAL EVERY 4 HOURS PRN
Status: DISCONTINUED | OUTPATIENT
Start: 2020-04-11 | End: 2020-04-16 | Stop reason: HOSPADM

## 2020-04-11 RX ORDER — PANTOPRAZOLE SODIUM 40 MG/10ML
40 INJECTION, POWDER, LYOPHILIZED, FOR SOLUTION INTRAVENOUS DAILY
Status: DISCONTINUED | OUTPATIENT
Start: 2020-04-11 | End: 2020-04-16 | Stop reason: HOSPADM

## 2020-04-11 RX ORDER — OXYCODONE HYDROCHLORIDE 5 MG/1
5 TABLET ORAL EVERY 6 HOURS PRN
COMMUNITY
Start: 2020-03-31 | End: 2020-05-03

## 2020-04-11 RX ORDER — SODIUM CHLORIDE 0.9 % (FLUSH) 0.9 %
10 SYRINGE (ML) INJECTION EVERY 12 HOURS SCHEDULED
Status: DISCONTINUED | OUTPATIENT
Start: 2020-04-11 | End: 2020-04-16 | Stop reason: HOSPADM

## 2020-04-11 RX ORDER — OXYCODONE HYDROCHLORIDE 10 MG/1
10 TABLET ORAL EVERY 4 HOURS PRN
Status: DISCONTINUED | OUTPATIENT
Start: 2020-04-11 | End: 2020-04-16 | Stop reason: HOSPADM

## 2020-04-11 RX ORDER — ONDANSETRON 2 MG/ML
4 INJECTION INTRAMUSCULAR; INTRAVENOUS EVERY 6 HOURS PRN
Status: DISCONTINUED | OUTPATIENT
Start: 2020-04-11 | End: 2020-04-16 | Stop reason: HOSPADM

## 2020-04-11 RX ORDER — SODIUM CHLORIDE 9 MG/ML
INJECTION, SOLUTION INTRAVENOUS CONTINUOUS
Status: DISCONTINUED | OUTPATIENT
Start: 2020-04-11 | End: 2020-04-16 | Stop reason: HOSPADM

## 2020-04-11 RX ORDER — ONDANSETRON 2 MG/ML
4 INJECTION INTRAMUSCULAR; INTRAVENOUS ONCE
Status: COMPLETED | OUTPATIENT
Start: 2020-04-11 | End: 2020-04-11

## 2020-04-11 RX ORDER — FLUOXETINE HYDROCHLORIDE 20 MG/1
20 CAPSULE ORAL DAILY
Status: DISCONTINUED | OUTPATIENT
Start: 2020-04-11 | End: 2020-04-16 | Stop reason: HOSPADM

## 2020-04-11 RX ORDER — 0.9 % SODIUM CHLORIDE 0.9 %
1000 INTRAVENOUS SOLUTION INTRAVENOUS ONCE
Status: COMPLETED | OUTPATIENT
Start: 2020-04-11 | End: 2020-04-11

## 2020-04-11 RX ORDER — ACETAMINOPHEN 650 MG/1
650 SUPPOSITORY RECTAL EVERY 6 HOURS PRN
Status: DISCONTINUED | OUTPATIENT
Start: 2020-04-11 | End: 2020-04-16 | Stop reason: HOSPADM

## 2020-04-11 RX ORDER — ACETAMINOPHEN 325 MG/1
650 TABLET ORAL EVERY 6 HOURS PRN
Status: DISCONTINUED | OUTPATIENT
Start: 2020-04-11 | End: 2020-04-16 | Stop reason: HOSPADM

## 2020-04-11 RX ORDER — SODIUM CHLORIDE 0.9 % (FLUSH) 0.9 %
10 SYRINGE (ML) INJECTION PRN
Status: DISCONTINUED | OUTPATIENT
Start: 2020-04-11 | End: 2020-04-16 | Stop reason: HOSPADM

## 2020-04-11 RX ADMIN — HYDROMORPHONE HYDROCHLORIDE 0.5 MG: 1 INJECTION, SOLUTION INTRAMUSCULAR; INTRAVENOUS; SUBCUTANEOUS at 11:45

## 2020-04-11 RX ADMIN — ENOXAPARIN SODIUM 40 MG: 40 INJECTION SUBCUTANEOUS at 21:04

## 2020-04-11 RX ADMIN — HYDROMORPHONE HYDROCHLORIDE 0.5 MG: 1 INJECTION, SOLUTION INTRAMUSCULAR; INTRAVENOUS; SUBCUTANEOUS at 13:48

## 2020-04-11 RX ADMIN — HYDROMORPHONE HYDROCHLORIDE 1 MG: 1 INJECTION, SOLUTION INTRAMUSCULAR; INTRAVENOUS; SUBCUTANEOUS at 15:12

## 2020-04-11 RX ADMIN — ONDANSETRON 4 MG: 2 INJECTION INTRAMUSCULAR; INTRAVENOUS at 11:45

## 2020-04-11 RX ADMIN — SODIUM CHLORIDE: 9 INJECTION, SOLUTION INTRAVENOUS at 16:11

## 2020-04-11 RX ADMIN — IOPAMIDOL 75 ML: 755 INJECTION, SOLUTION INTRAVENOUS at 12:22

## 2020-04-11 RX ADMIN — OXYCODONE HYDROCHLORIDE 10 MG: 10 TABLET ORAL at 16:53

## 2020-04-11 RX ADMIN — OXYCODONE HYDROCHLORIDE 10 MG: 10 TABLET ORAL at 21:04

## 2020-04-11 RX ADMIN — Medication 10 ML: at 16:40

## 2020-04-11 RX ADMIN — PANTOPRAZOLE SODIUM 40 MG: 40 INJECTION, POWDER, FOR SOLUTION INTRAVENOUS at 16:39

## 2020-04-11 RX ADMIN — SODIUM CHLORIDE 1000 ML: 9 INJECTION, SOLUTION INTRAVENOUS at 11:45

## 2020-04-11 RX ADMIN — ZOLPIDEM TARTRATE 10 MG: 5 TABLET ORAL at 22:37

## 2020-04-11 ASSESSMENT — PAIN DESCRIPTION - FREQUENCY
FREQUENCY: CONTINUOUS

## 2020-04-11 ASSESSMENT — PAIN SCALES - GENERAL
PAINLEVEL_OUTOF10: 0
PAINLEVEL_OUTOF10: 5
PAINLEVEL_OUTOF10: 8
PAINLEVEL_OUTOF10: 7
PAINLEVEL_OUTOF10: 6
PAINLEVEL_OUTOF10: 2
PAINLEVEL_OUTOF10: 5
PAINLEVEL_OUTOF10: 0
PAINLEVEL_OUTOF10: 8
PAINLEVEL_OUTOF10: 4
PAINLEVEL_OUTOF10: 7
PAINLEVEL_OUTOF10: 6
PAINLEVEL_OUTOF10: 6

## 2020-04-11 ASSESSMENT — PAIN DESCRIPTION - LOCATION
LOCATION: ABDOMEN

## 2020-04-11 ASSESSMENT — PAIN DESCRIPTION - PAIN TYPE
TYPE: ACUTE PAIN

## 2020-04-11 ASSESSMENT — PAIN DESCRIPTION - PROGRESSION
CLINICAL_PROGRESSION: NOT CHANGED
CLINICAL_PROGRESSION: GRADUALLY WORSENING
CLINICAL_PROGRESSION: NOT CHANGED
CLINICAL_PROGRESSION: NOT CHANGED

## 2020-04-11 ASSESSMENT — PAIN DESCRIPTION - ONSET
ONSET: AWAKENED FROM SLEEP
ONSET: ON-GOING

## 2020-04-11 ASSESSMENT — PAIN DESCRIPTION - DESCRIPTORS
DESCRIPTORS: ACHING
DESCRIPTORS: ACHING
DESCRIPTORS: ACHING;CONSTANT
DESCRIPTORS: ACHING

## 2020-04-11 ASSESSMENT — PAIN - FUNCTIONAL ASSESSMENT
PAIN_FUNCTIONAL_ASSESSMENT: PREVENTS OR INTERFERES SOME ACTIVE ACTIVITIES AND ADLS
PAIN_FUNCTIONAL_ASSESSMENT: ACTIVITIES ARE NOT PREVENTED

## 2020-04-11 ASSESSMENT — PAIN DESCRIPTION - ORIENTATION
ORIENTATION: LEFT;LOWER;MID
ORIENTATION: MID
ORIENTATION: LEFT;MID
ORIENTATION: MID

## 2020-04-11 NOTE — ED TRIAGE NOTES
Patient arrived ambulatory, placed in ED bed 43. C/o lower abd pain, n/v since last night. Hx of bowel obstructions last week.

## 2020-04-11 NOTE — ED PROVIDER NOTES
(AMBIEN) 10 MG TABLET    Take 1 tablet by mouth nightly as needed (1 po hs) for up to 90 days. ALLERGIES     Nsaids; Prochlorperazine; Valtrex [valacyclovir hcl]; Morphine; Morphine and related; and Tolmetin    FAMILYHISTORY       Family History   Problem Relation Age of Onset    Cancer Father         Lymphoma    Arthritis Mother     Dementia Other     Diabetes Other     Cancer Other     Diabetes Maternal Uncle     Heart Disease Maternal Uncle     Depression Other     Heart Disease Maternal Uncle           SOCIAL HISTORY       Social History     Tobacco Use    Smoking status: Former Smoker     Packs/day: 0.50     Years: 25.00     Pack years: 12.50     Types: Cigarettes     Last attempt to quit: 2017     Years since quittin.6    Smokeless tobacco: Never Used   Substance Use Topics    Alcohol use: No     Alcohol/week: 0.0 standard drinks     Comment: last drink     Drug use: No       SCREENINGS    Nohemy Coma Scale  Eye Opening: Spontaneous  Best Verbal Response: Oriented  Best Motor Response: Obeys commands  Nohemy Coma Scale Score: 15        PHYSICAL EXAM    (up to 7 for level 4, 8 or more for level 5)     ED Triage Vitals   Enc Vitals Group      BP 20 1038 (!) 146/77      Pulse 20 1038 69      Resp 20 1038 16      Temp 20 1030 98.7 °F (37.1 °C)      Temp Source 20 1030 Oral      SpO2 20 1038 95 %      Weight 20 1038 240 lb 11.9 oz (109.2 kg)      Height 20 1038 6' (1.829 m)       Physical Exam  Vitals signs and nursing note reviewed. Constitutional:       General: He is awake. Appearance: Normal appearance. He is well-developed and overweight. HENT:      Head: Normocephalic and atraumatic. Nose: Nose normal.   Eyes:      General:         Right eye: No discharge. Left eye: No discharge. Neck:      Musculoskeletal: Normal range of motion. Cardiovascular:      Rate and Rhythm: Normal rate and regular rhythm.

## 2020-04-11 NOTE — H&P
Maternal Uncle     Heart Disease Maternal Uncle     Depression Other     Heart Disease Maternal Uncle        REVIEW OF SYSTEMS:    CONSTITUTIONAL:  negative  HEENT:  negative  CARDIOVASCULAR:  negative  GASTROINTESTINAL:  positive for nausea, vomiting and abdominal pain  GENITOURINARY:  negative  HEMATOLOGIC/LYMPHATIC:  negative  ENDOCRINE:  negative  All other systems negative    PHYSICAL EXAM:    VITALS:  BP (!) 142/85   Pulse 69   Temp 98.7 °F (37.1 °C) (Oral)   Resp 18   Ht 6' (1.829 m)   Wt 240 lb 11.9 oz (109.2 kg)   SpO2 100%   BMI 32.65 kg/m²   INTAKE/OUTPUT:   No intake/output data recorded. No intake/output data recorded. CONSTITUTIONAL:  awake, alert, no apparent distress and mildly obese  ENT:  normocepalic, without obvious abnormality  NECK:  supple, symmetrical, trachea midline   LUNGS:  clear to auscultation, no crackles or wheezing  CARDIOVASCULAR:  regular rate and rhythm and no murmur noted  ABDOMEN: mildly distended, diffuse mild tenderness, no guarding. MUSCULOSKELETAL:  0+ pitting edema lower extremities  NEUROLOGIC:  Mental Status Exam:  Level of Alertness:   awake  Orientation:   person, place, time      ASSESSMENT AND PLAN:    Partial SBO   Recurrent   NG in place   No sign of peritonitis or perforation   No acute intervention needed   Admit for hydration, NG decompression and serial exams   Repeat labs and X ray in AM    Electronically signed by Sage Hoyt MD on 4/11/2020 at 2:49 PM      PRACTITIONER CERTIFICATION   I certify that Pollo Oropeza is expected to be hospitalized for 3-4 days based on the above assessment and plan.     Sage Hoyt

## 2020-04-12 ENCOUNTER — APPOINTMENT (OUTPATIENT)
Dept: GENERAL RADIOLOGY | Age: 50
DRG: 390 | End: 2020-04-12
Payer: COMMERCIAL

## 2020-04-12 LAB
ANION GAP SERPL CALCULATED.3IONS-SCNC: 11 MMOL/L (ref 3–16)
BUN BLDV-MCNC: 7 MG/DL (ref 7–20)
CALCIUM SERPL-MCNC: 8.9 MG/DL (ref 8.3–10.6)
CHLORIDE BLD-SCNC: 105 MMOL/L (ref 99–110)
CO2: 23 MMOL/L (ref 21–32)
CREAT SERPL-MCNC: 0.9 MG/DL (ref 0.9–1.3)
EKG ATRIAL RATE: 60 BPM
EKG DIAGNOSIS: NORMAL
EKG P AXIS: 36 DEGREES
EKG P-R INTERVAL: 152 MS
EKG Q-T INTERVAL: 432 MS
EKG QRS DURATION: 82 MS
EKG QTC CALCULATION (BAZETT): 432 MS
EKG R AXIS: -5 DEGREES
EKG T AXIS: 20 DEGREES
EKG VENTRICULAR RATE: 60 BPM
GFR AFRICAN AMERICAN: >60
GFR NON-AFRICAN AMERICAN: >60
GLUCOSE BLD-MCNC: 94 MG/DL (ref 70–99)
HCT VFR BLD CALC: 42.6 % (ref 40.5–52.5)
HEMOGLOBIN: 13.4 G/DL (ref 13.5–17.5)
MCH RBC QN AUTO: 27.7 PG (ref 26–34)
MCHC RBC AUTO-ENTMCNC: 31.5 G/DL (ref 31–36)
MCV RBC AUTO: 87.9 FL (ref 80–100)
PDW BLD-RTO: 15.4 % (ref 12.4–15.4)
PLATELET # BLD: 392 K/UL (ref 135–450)
PMV BLD AUTO: 9.2 FL (ref 5–10.5)
POTASSIUM REFLEX MAGNESIUM: 4.3 MMOL/L (ref 3.5–5.1)
RBC # BLD: 4.85 M/UL (ref 4.2–5.9)
SODIUM BLD-SCNC: 139 MMOL/L (ref 136–145)
WBC # BLD: 7.3 K/UL (ref 4–11)

## 2020-04-12 PROCEDURE — 94760 N-INVAS EAR/PLS OXIMETRY 1: CPT

## 2020-04-12 PROCEDURE — C9113 INJ PANTOPRAZOLE SODIUM, VIA: HCPCS | Performed by: SURGERY

## 2020-04-12 PROCEDURE — 74019 RADEX ABDOMEN 2 VIEWS: CPT

## 2020-04-12 PROCEDURE — 1200000000 HC SEMI PRIVATE

## 2020-04-12 PROCEDURE — 93010 ELECTROCARDIOGRAM REPORT: CPT | Performed by: INTERNAL MEDICINE

## 2020-04-12 PROCEDURE — 80048 BASIC METABOLIC PNL TOTAL CA: CPT

## 2020-04-12 PROCEDURE — 6360000002 HC RX W HCPCS: Performed by: SURGERY

## 2020-04-12 PROCEDURE — 6370000000 HC RX 637 (ALT 250 FOR IP): Performed by: SURGERY

## 2020-04-12 PROCEDURE — 36415 COLL VENOUS BLD VENIPUNCTURE: CPT

## 2020-04-12 PROCEDURE — 85027 COMPLETE CBC AUTOMATED: CPT

## 2020-04-12 PROCEDURE — 2580000003 HC RX 258: Performed by: SURGERY

## 2020-04-12 PROCEDURE — 99232 SBSQ HOSP IP/OBS MODERATE 35: CPT | Performed by: SURGERY

## 2020-04-12 RX ADMIN — PANTOPRAZOLE SODIUM 40 MG: 40 INJECTION, POWDER, FOR SOLUTION INTRAVENOUS at 09:07

## 2020-04-12 RX ADMIN — OXYCODONE HYDROCHLORIDE 10 MG: 10 TABLET ORAL at 09:38

## 2020-04-12 RX ADMIN — HYDROMORPHONE HYDROCHLORIDE 0.5 MG: 1 INJECTION, SOLUTION INTRAMUSCULAR; INTRAVENOUS; SUBCUTANEOUS at 20:27

## 2020-04-12 RX ADMIN — HYDROMORPHONE HYDROCHLORIDE 0.5 MG: 1 INJECTION, SOLUTION INTRAMUSCULAR; INTRAVENOUS; SUBCUTANEOUS at 13:55

## 2020-04-12 RX ADMIN — FLUOXETINE 20 MG: 20 CAPSULE ORAL at 09:07

## 2020-04-12 RX ADMIN — HYDROMORPHONE HYDROCHLORIDE 0.5 MG: 1 INJECTION, SOLUTION INTRAMUSCULAR; INTRAVENOUS; SUBCUTANEOUS at 10:46

## 2020-04-12 RX ADMIN — HYDROMORPHONE HYDROCHLORIDE 0.5 MG: 1 INJECTION, SOLUTION INTRAMUSCULAR; INTRAVENOUS; SUBCUTANEOUS at 23:34

## 2020-04-12 RX ADMIN — HYDROMORPHONE HYDROCHLORIDE 0.5 MG: 1 INJECTION, SOLUTION INTRAMUSCULAR; INTRAVENOUS; SUBCUTANEOUS at 16:56

## 2020-04-12 RX ADMIN — OXYCODONE HYDROCHLORIDE 10 MG: 10 TABLET ORAL at 19:09

## 2020-04-12 RX ADMIN — SODIUM CHLORIDE, PRESERVATIVE FREE 10 ML: 5 INJECTION INTRAVENOUS at 09:09

## 2020-04-12 RX ADMIN — SODIUM CHLORIDE: 9 INJECTION, SOLUTION INTRAVENOUS at 17:47

## 2020-04-12 RX ADMIN — Medication 10 ML: at 09:08

## 2020-04-12 RX ADMIN — OXYCODONE HYDROCHLORIDE 10 MG: 10 TABLET ORAL at 15:09

## 2020-04-12 RX ADMIN — OXYCODONE HYDROCHLORIDE 10 MG: 10 TABLET ORAL at 05:35

## 2020-04-12 RX ADMIN — SODIUM CHLORIDE: 9 INJECTION, SOLUTION INTRAVENOUS at 09:07

## 2020-04-12 RX ADMIN — ENOXAPARIN SODIUM 40 MG: 40 INJECTION SUBCUTANEOUS at 20:25

## 2020-04-12 ASSESSMENT — PAIN SCALES - GENERAL
PAINLEVEL_OUTOF10: 8
PAINLEVEL_OUTOF10: 7
PAINLEVEL_OUTOF10: 8
PAINLEVEL_OUTOF10: 5
PAINLEVEL_OUTOF10: 7
PAINLEVEL_OUTOF10: 7
PAINLEVEL_OUTOF10: 6
PAINLEVEL_OUTOF10: 7
PAINLEVEL_OUTOF10: 6
PAINLEVEL_OUTOF10: 8
PAINLEVEL_OUTOF10: 8
PAINLEVEL_OUTOF10: 5
PAINLEVEL_OUTOF10: 7
PAINLEVEL_OUTOF10: 8
PAINLEVEL_OUTOF10: 8

## 2020-04-12 ASSESSMENT — PAIN DESCRIPTION - DESCRIPTORS
DESCRIPTORS: ACHING

## 2020-04-12 ASSESSMENT — PAIN - FUNCTIONAL ASSESSMENT
PAIN_FUNCTIONAL_ASSESSMENT: ACTIVITIES ARE NOT PREVENTED

## 2020-04-12 ASSESSMENT — PAIN DESCRIPTION - LOCATION
LOCATION: ABDOMEN

## 2020-04-12 ASSESSMENT — PAIN DESCRIPTION - FREQUENCY
FREQUENCY: CONTINUOUS

## 2020-04-12 ASSESSMENT — PAIN DESCRIPTION - PROGRESSION

## 2020-04-12 ASSESSMENT — PAIN DESCRIPTION - ORIENTATION
ORIENTATION: MID

## 2020-04-12 ASSESSMENT — PAIN DESCRIPTION - ONSET
ONSET: ON-GOING

## 2020-04-12 ASSESSMENT — PAIN SCALES - WONG BAKER

## 2020-04-12 ASSESSMENT — PAIN DESCRIPTION - PAIN TYPE
TYPE: ACUTE PAIN

## 2020-04-12 ASSESSMENT — ENCOUNTER SYMPTOMS: NAUSEA: 0

## 2020-04-13 ENCOUNTER — APPOINTMENT (OUTPATIENT)
Dept: GENERAL RADIOLOGY | Age: 50
DRG: 390 | End: 2020-04-13
Payer: COMMERCIAL

## 2020-04-13 LAB
GLUCOSE BLD-MCNC: 100 MG/DL (ref 70–99)
GLUCOSE BLD-MCNC: 89 MG/DL (ref 70–99)
PERFORMED ON: ABNORMAL
PERFORMED ON: NORMAL

## 2020-04-13 PROCEDURE — 6360000002 HC RX W HCPCS: Performed by: SURGERY

## 2020-04-13 PROCEDURE — 1200000000 HC SEMI PRIVATE

## 2020-04-13 PROCEDURE — C9113 INJ PANTOPRAZOLE SODIUM, VIA: HCPCS | Performed by: SURGERY

## 2020-04-13 PROCEDURE — APPSS15 APP SPLIT SHARED TIME 0-15 MINUTES: Performed by: PHYSICIAN ASSISTANT

## 2020-04-13 PROCEDURE — 94760 N-INVAS EAR/PLS OXIMETRY 1: CPT

## 2020-04-13 PROCEDURE — 74019 RADEX ABDOMEN 2 VIEWS: CPT

## 2020-04-13 PROCEDURE — 2580000003 HC RX 258: Performed by: SURGERY

## 2020-04-13 PROCEDURE — 6370000000 HC RX 637 (ALT 250 FOR IP): Performed by: SURGERY

## 2020-04-13 PROCEDURE — APPNB30 APP NON BILLABLE TIME 0-30 MINS: Performed by: PHYSICIAN ASSISTANT

## 2020-04-13 RX ADMIN — HYDROMORPHONE HYDROCHLORIDE 0.5 MG: 1 INJECTION, SOLUTION INTRAMUSCULAR; INTRAVENOUS; SUBCUTANEOUS at 21:19

## 2020-04-13 RX ADMIN — OXYCODONE HYDROCHLORIDE 10 MG: 10 TABLET ORAL at 04:47

## 2020-04-13 RX ADMIN — HYDROMORPHONE HYDROCHLORIDE 0.5 MG: 1 INJECTION, SOLUTION INTRAMUSCULAR; INTRAVENOUS; SUBCUTANEOUS at 17:04

## 2020-04-13 RX ADMIN — Medication 10 ML: at 08:11

## 2020-04-13 RX ADMIN — MAGNESIUM HYDROXIDE 30 ML: 400 SUSPENSION ORAL at 19:51

## 2020-04-13 RX ADMIN — OXYCODONE HYDROCHLORIDE 10 MG: 10 TABLET ORAL at 09:45

## 2020-04-13 RX ADMIN — HYDROMORPHONE HYDROCHLORIDE 0.5 MG: 1 INJECTION, SOLUTION INTRAMUSCULAR; INTRAVENOUS; SUBCUTANEOUS at 15:05

## 2020-04-13 RX ADMIN — HYDROMORPHONE HYDROCHLORIDE 0.5 MG: 1 INJECTION, SOLUTION INTRAMUSCULAR; INTRAVENOUS; SUBCUTANEOUS at 05:59

## 2020-04-13 RX ADMIN — HYDROMORPHONE HYDROCHLORIDE 0.5 MG: 1 INJECTION, SOLUTION INTRAMUSCULAR; INTRAVENOUS; SUBCUTANEOUS at 10:53

## 2020-04-13 RX ADMIN — OXYCODONE HYDROCHLORIDE 10 MG: 10 TABLET ORAL at 19:51

## 2020-04-13 RX ADMIN — PANTOPRAZOLE SODIUM 40 MG: 40 INJECTION, POWDER, FOR SOLUTION INTRAVENOUS at 08:05

## 2020-04-13 RX ADMIN — SODIUM CHLORIDE: 9 INJECTION, SOLUTION INTRAVENOUS at 13:57

## 2020-04-13 RX ADMIN — FLUOXETINE 20 MG: 20 CAPSULE ORAL at 08:05

## 2020-04-13 RX ADMIN — SODIUM CHLORIDE, PRESERVATIVE FREE 10 ML: 5 INJECTION INTRAVENOUS at 08:06

## 2020-04-13 RX ADMIN — MAGNESIUM HYDROXIDE 30 ML: 400 SUSPENSION ORAL at 13:53

## 2020-04-13 RX ADMIN — ONDANSETRON HYDROCHLORIDE 4 MG: 2 SOLUTION INTRAMUSCULAR; INTRAVENOUS at 09:45

## 2020-04-13 RX ADMIN — ZOLPIDEM TARTRATE 10 MG: 5 TABLET ORAL at 00:29

## 2020-04-13 RX ADMIN — ENOXAPARIN SODIUM 40 MG: 40 INJECTION SUBCUTANEOUS at 19:51

## 2020-04-13 RX ADMIN — HYDROMORPHONE HYDROCHLORIDE 0.5 MG: 1 INJECTION, SOLUTION INTRAMUSCULAR; INTRAVENOUS; SUBCUTANEOUS at 08:05

## 2020-04-13 RX ADMIN — SODIUM CHLORIDE: 9 INJECTION, SOLUTION INTRAVENOUS at 21:20

## 2020-04-13 RX ADMIN — Medication 10 ML: at 08:00

## 2020-04-13 RX ADMIN — SODIUM CHLORIDE: 9 INJECTION, SOLUTION INTRAVENOUS at 05:57

## 2020-04-13 RX ADMIN — OXYCODONE HYDROCHLORIDE 10 MG: 10 TABLET ORAL at 13:53

## 2020-04-13 ASSESSMENT — PAIN DESCRIPTION - PROGRESSION

## 2020-04-13 ASSESSMENT — PAIN DESCRIPTION - ORIENTATION
ORIENTATION: MID
ORIENTATION: MID;LEFT;RIGHT
ORIENTATION: MID
ORIENTATION: MID;LEFT;RIGHT
ORIENTATION: MID

## 2020-04-13 ASSESSMENT — PAIN - FUNCTIONAL ASSESSMENT
PAIN_FUNCTIONAL_ASSESSMENT: PREVENTS OR INTERFERES SOME ACTIVE ACTIVITIES AND ADLS
PAIN_FUNCTIONAL_ASSESSMENT: ACTIVITIES ARE NOT PREVENTED
PAIN_FUNCTIONAL_ASSESSMENT: PREVENTS OR INTERFERES SOME ACTIVE ACTIVITIES AND ADLS
PAIN_FUNCTIONAL_ASSESSMENT: ACTIVITIES ARE NOT PREVENTED
PAIN_FUNCTIONAL_ASSESSMENT: PREVENTS OR INTERFERES SOME ACTIVE ACTIVITIES AND ADLS

## 2020-04-13 ASSESSMENT — PAIN DESCRIPTION - FREQUENCY
FREQUENCY: CONTINUOUS

## 2020-04-13 ASSESSMENT — PAIN DESCRIPTION - DESCRIPTORS
DESCRIPTORS: THROBBING
DESCRIPTORS: ACHING
DESCRIPTORS: ACHING
DESCRIPTORS: THROBBING
DESCRIPTORS: ACHING
DESCRIPTORS: SPASM;THROBBING
DESCRIPTORS: SPASM;THROBBING
DESCRIPTORS: ACHING
DESCRIPTORS: ACHING
DESCRIPTORS: SPASM;THROBBING
DESCRIPTORS: THROBBING

## 2020-04-13 ASSESSMENT — PAIN DESCRIPTION - LOCATION
LOCATION: ABDOMEN

## 2020-04-13 ASSESSMENT — PAIN SCALES - GENERAL
PAINLEVEL_OUTOF10: 8

## 2020-04-13 ASSESSMENT — PAIN DESCRIPTION - PAIN TYPE
TYPE: ACUTE PAIN

## 2020-04-13 ASSESSMENT — PAIN DESCRIPTION - ONSET
ONSET: ON-GOING

## 2020-04-13 ASSESSMENT — PAIN SCALES - WONG BAKER
WONGBAKER_NUMERICALRESPONSE: 0

## 2020-04-13 NOTE — CARE COORDINATION
INITIAL CASE MANAGEMENT ASSESSMENT    Reviewed chart, met with patient to assess possible discharge needs. Explained Case Management role/services. SW met with patient alone at bedside. Living Situation: Patient reports that he resides in a single family home with his wife, mother, two children, one dog and one cat. He stated that there are four stairs leading up to the front door and he has no issues getting in or out of the property at this time. ADLs: Prior to medical admission patient reports that he was independent. He stated that he does not anticipate any needs going home. DME: Prior to medical admission patient reports that he used a wound vac and had access to a cane, walker and a shower chair but does not use them. He stated that he does not anticipate any needs going home. PT/OT Recs: Since medical admission patient reports that he has been up at [facility]. He stated that he does not anticipate any needs going home. Active Services: Prior to medical admission patient reports that he was receiving RN only services from [facility]. He will need a continuation of care order at discharge. Transportation: Prior to medical admission patient reports that he was an active . He stated that hiw wife will likely transport him home at discharge. Medications: Patient reports that he receives medications from [address] [place] [address]. He stated that there are no issues with access or affordability at this time. PCP: Bria Goddard [phone] (phone) and [phone] (fax number). Patient reports that his last visit with this provider was Nov 2019. PLAN/COMMENTS:   1) Obtain order for resumption of nursing care for [facility]. 2) Monitor for IVAB needs. 3) Discharge to home with family. SW provided contact information for patient or family to call with any questions. SW will follow and assist as psychosocial needs arise.      Respectfully

## 2020-04-14 ENCOUNTER — APPOINTMENT (OUTPATIENT)
Dept: GENERAL RADIOLOGY | Age: 50
DRG: 390 | End: 2020-04-14
Payer: COMMERCIAL

## 2020-04-14 LAB
GLUCOSE BLD-MCNC: 74 MG/DL (ref 70–99)
GLUCOSE BLD-MCNC: 91 MG/DL (ref 70–99)
GLUCOSE BLD-MCNC: 91 MG/DL (ref 70–99)
GLUCOSE BLD-MCNC: 98 MG/DL (ref 70–99)
PERFORMED ON: NORMAL

## 2020-04-14 PROCEDURE — 1200000000 HC SEMI PRIVATE

## 2020-04-14 PROCEDURE — 6370000000 HC RX 637 (ALT 250 FOR IP): Performed by: SURGERY

## 2020-04-14 PROCEDURE — C9113 INJ PANTOPRAZOLE SODIUM, VIA: HCPCS | Performed by: SURGERY

## 2020-04-14 PROCEDURE — 6360000002 HC RX W HCPCS: Performed by: SURGERY

## 2020-04-14 PROCEDURE — 99232 SBSQ HOSP IP/OBS MODERATE 35: CPT | Performed by: SURGERY

## 2020-04-14 PROCEDURE — 2580000003 HC RX 258: Performed by: SURGERY

## 2020-04-14 PROCEDURE — APPSS15 APP SPLIT SHARED TIME 0-15 MINUTES: Performed by: PHYSICIAN ASSISTANT

## 2020-04-14 PROCEDURE — APPNB30 APP NON BILLABLE TIME 0-30 MINS: Performed by: PHYSICIAN ASSISTANT

## 2020-04-14 PROCEDURE — 74019 RADEX ABDOMEN 2 VIEWS: CPT

## 2020-04-14 RX ADMIN — ONDANSETRON HYDROCHLORIDE 4 MG: 2 SOLUTION INTRAMUSCULAR; INTRAVENOUS at 12:46

## 2020-04-14 RX ADMIN — MAGNESIUM HYDROXIDE 30 ML: 400 SUSPENSION ORAL at 21:09

## 2020-04-14 RX ADMIN — Medication 10 ML: at 08:56

## 2020-04-14 RX ADMIN — SODIUM CHLORIDE: 9 INJECTION, SOLUTION INTRAVENOUS at 21:09

## 2020-04-14 RX ADMIN — ZOLPIDEM TARTRATE 10 MG: 5 TABLET ORAL at 21:09

## 2020-04-14 RX ADMIN — SODIUM CHLORIDE: 9 INJECTION, SOLUTION INTRAVENOUS at 14:07

## 2020-04-14 RX ADMIN — HYDROMORPHONE HYDROCHLORIDE 0.5 MG: 1 INJECTION, SOLUTION INTRAMUSCULAR; INTRAVENOUS; SUBCUTANEOUS at 17:10

## 2020-04-14 RX ADMIN — HYDROMORPHONE HYDROCHLORIDE 0.5 MG: 1 INJECTION, SOLUTION INTRAMUSCULAR; INTRAVENOUS; SUBCUTANEOUS at 19:09

## 2020-04-14 RX ADMIN — OXYCODONE HYDROCHLORIDE 10 MG: 10 TABLET ORAL at 08:55

## 2020-04-14 RX ADMIN — HYDROMORPHONE HYDROCHLORIDE 0.5 MG: 1 INJECTION, SOLUTION INTRAMUSCULAR; INTRAVENOUS; SUBCUTANEOUS at 07:20

## 2020-04-14 RX ADMIN — SODIUM CHLORIDE: 9 INJECTION, SOLUTION INTRAVENOUS at 05:11

## 2020-04-14 RX ADMIN — HYDROMORPHONE HYDROCHLORIDE 0.5 MG: 1 INJECTION, SOLUTION INTRAMUSCULAR; INTRAVENOUS; SUBCUTANEOUS at 14:05

## 2020-04-14 RX ADMIN — OXYCODONE HYDROCHLORIDE 10 MG: 10 TABLET ORAL at 16:13

## 2020-04-14 RX ADMIN — ZOLPIDEM TARTRATE 10 MG: 5 TABLET ORAL at 01:43

## 2020-04-14 RX ADMIN — ONDANSETRON HYDROCHLORIDE 4 MG: 2 SOLUTION INTRAMUSCULAR; INTRAVENOUS at 07:20

## 2020-04-14 RX ADMIN — HYDROMORPHONE HYDROCHLORIDE 0.5 MG: 1 INJECTION, SOLUTION INTRAMUSCULAR; INTRAVENOUS; SUBCUTANEOUS at 09:57

## 2020-04-14 RX ADMIN — HYDROMORPHONE HYDROCHLORIDE 0.5 MG: 1 INJECTION, SOLUTION INTRAMUSCULAR; INTRAVENOUS; SUBCUTANEOUS at 05:09

## 2020-04-14 RX ADMIN — SODIUM CHLORIDE, PRESERVATIVE FREE 10 ML: 5 INJECTION INTRAVENOUS at 08:56

## 2020-04-14 RX ADMIN — ENOXAPARIN SODIUM 40 MG: 40 INJECTION SUBCUTANEOUS at 21:09

## 2020-04-14 RX ADMIN — HYDROMORPHONE HYDROCHLORIDE 0.5 MG: 1 INJECTION, SOLUTION INTRAMUSCULAR; INTRAVENOUS; SUBCUTANEOUS at 12:00

## 2020-04-14 RX ADMIN — PANTOPRAZOLE SODIUM 40 MG: 40 INJECTION, POWDER, FOR SOLUTION INTRAVENOUS at 08:55

## 2020-04-14 RX ADMIN — FLUOXETINE 20 MG: 20 CAPSULE ORAL at 08:55

## 2020-04-14 RX ADMIN — SODIUM CHLORIDE: 9 INJECTION, SOLUTION INTRAVENOUS at 21:15

## 2020-04-14 RX ADMIN — SODIUM CHLORIDE, PRESERVATIVE FREE 10 ML: 5 INJECTION INTRAVENOUS at 21:10

## 2020-04-14 RX ADMIN — OXYCODONE HYDROCHLORIDE 10 MG: 10 TABLET ORAL at 01:43

## 2020-04-14 RX ADMIN — HYDROMORPHONE HYDROCHLORIDE 0.5 MG: 1 INJECTION, SOLUTION INTRAMUSCULAR; INTRAVENOUS; SUBCUTANEOUS at 23:22

## 2020-04-14 RX ADMIN — HYDROMORPHONE HYDROCHLORIDE 0.5 MG: 1 INJECTION, SOLUTION INTRAMUSCULAR; INTRAVENOUS; SUBCUTANEOUS at 21:09

## 2020-04-14 RX ADMIN — MAGNESIUM HYDROXIDE 30 ML: 400 SUSPENSION ORAL at 08:55

## 2020-04-14 ASSESSMENT — PAIN DESCRIPTION - LOCATION
LOCATION: ABDOMEN

## 2020-04-14 ASSESSMENT — PAIN DESCRIPTION - ORIENTATION
ORIENTATION: LEFT
ORIENTATION: MID;RIGHT;LEFT
ORIENTATION: LEFT
ORIENTATION: MID;RIGHT;LEFT
ORIENTATION: LEFT
ORIENTATION: MID;LEFT;RIGHT
ORIENTATION: LEFT
ORIENTATION: LEFT
ORIENTATION: LEFT;MID;RIGHT
ORIENTATION: MID;LEFT;RIGHT
ORIENTATION: MID;LEFT;RIGHT
ORIENTATION: LEFT
ORIENTATION: MID;LEFT;RIGHT
ORIENTATION: LEFT
ORIENTATION: MID;LEFT;RIGHT
ORIENTATION: LEFT
ORIENTATION: LEFT

## 2020-04-14 ASSESSMENT — PAIN DESCRIPTION - PROGRESSION
CLINICAL_PROGRESSION: NOT CHANGED
CLINICAL_PROGRESSION: GRADUALLY WORSENING
CLINICAL_PROGRESSION: NOT CHANGED
CLINICAL_PROGRESSION: GRADUALLY WORSENING
CLINICAL_PROGRESSION: NOT CHANGED
CLINICAL_PROGRESSION: NOT CHANGED
CLINICAL_PROGRESSION: GRADUALLY WORSENING
CLINICAL_PROGRESSION: NOT CHANGED

## 2020-04-14 ASSESSMENT — PAIN SCALES - GENERAL
PAINLEVEL_OUTOF10: 7
PAINLEVEL_OUTOF10: 10
PAINLEVEL_OUTOF10: 10
PAINLEVEL_OUTOF10: 9
PAINLEVEL_OUTOF10: 8
PAINLEVEL_OUTOF10: 9
PAINLEVEL_OUTOF10: 8
PAINLEVEL_OUTOF10: 9
PAINLEVEL_OUTOF10: 10
PAINLEVEL_OUTOF10: 0
PAINLEVEL_OUTOF10: 8
PAINLEVEL_OUTOF10: 10
PAINLEVEL_OUTOF10: 8
PAINLEVEL_OUTOF10: 9
PAINLEVEL_OUTOF10: 8

## 2020-04-14 ASSESSMENT — PAIN DESCRIPTION - FREQUENCY
FREQUENCY: CONTINUOUS

## 2020-04-14 ASSESSMENT — PAIN DESCRIPTION - DESCRIPTORS
DESCRIPTORS: ACHING

## 2020-04-14 ASSESSMENT — PAIN - FUNCTIONAL ASSESSMENT
PAIN_FUNCTIONAL_ASSESSMENT: PREVENTS OR INTERFERES SOME ACTIVE ACTIVITIES AND ADLS

## 2020-04-14 ASSESSMENT — PAIN DESCRIPTION - ONSET
ONSET: ON-GOING

## 2020-04-14 ASSESSMENT — PAIN DESCRIPTION - PAIN TYPE
TYPE: ACUTE PAIN

## 2020-04-14 ASSESSMENT — PAIN SCALES - WONG BAKER
WONGBAKER_NUMERICALRESPONSE: 0

## 2020-04-15 LAB
GLUCOSE BLD-MCNC: 103 MG/DL (ref 70–99)
GLUCOSE BLD-MCNC: 112 MG/DL (ref 70–99)
GLUCOSE BLD-MCNC: 61 MG/DL (ref 70–99)
GLUCOSE BLD-MCNC: 77 MG/DL (ref 70–99)
GLUCOSE BLD-MCNC: 87 MG/DL (ref 70–99)
PERFORMED ON: ABNORMAL
PERFORMED ON: NORMAL
PERFORMED ON: NORMAL

## 2020-04-15 PROCEDURE — APPNB30 APP NON BILLABLE TIME 0-30 MINS: Performed by: PHYSICIAN ASSISTANT

## 2020-04-15 PROCEDURE — 94760 N-INVAS EAR/PLS OXIMETRY 1: CPT

## 2020-04-15 PROCEDURE — 2580000003 HC RX 258: Performed by: SURGERY

## 2020-04-15 PROCEDURE — 6370000000 HC RX 637 (ALT 250 FOR IP): Performed by: SURGERY

## 2020-04-15 PROCEDURE — 1200000000 HC SEMI PRIVATE

## 2020-04-15 PROCEDURE — APPSS15 APP SPLIT SHARED TIME 0-15 MINUTES: Performed by: PHYSICIAN ASSISTANT

## 2020-04-15 PROCEDURE — 6360000002 HC RX W HCPCS: Performed by: SURGERY

## 2020-04-15 PROCEDURE — C9113 INJ PANTOPRAZOLE SODIUM, VIA: HCPCS | Performed by: SURGERY

## 2020-04-15 RX ADMIN — HYDROMORPHONE HYDROCHLORIDE 0.5 MG: 1 INJECTION, SOLUTION INTRAMUSCULAR; INTRAVENOUS; SUBCUTANEOUS at 11:35

## 2020-04-15 RX ADMIN — HYDROMORPHONE HYDROCHLORIDE 0.5 MG: 1 INJECTION, SOLUTION INTRAMUSCULAR; INTRAVENOUS; SUBCUTANEOUS at 01:30

## 2020-04-15 RX ADMIN — HYDROMORPHONE HYDROCHLORIDE 0.5 MG: 1 INJECTION, SOLUTION INTRAMUSCULAR; INTRAVENOUS; SUBCUTANEOUS at 05:42

## 2020-04-15 RX ADMIN — HYDROMORPHONE HYDROCHLORIDE 0.5 MG: 1 INJECTION, SOLUTION INTRAMUSCULAR; INTRAVENOUS; SUBCUTANEOUS at 21:02

## 2020-04-15 RX ADMIN — HYDROMORPHONE HYDROCHLORIDE 0.5 MG: 1 INJECTION, SOLUTION INTRAMUSCULAR; INTRAVENOUS; SUBCUTANEOUS at 09:24

## 2020-04-15 RX ADMIN — HYDROMORPHONE HYDROCHLORIDE 0.5 MG: 1 INJECTION, SOLUTION INTRAMUSCULAR; INTRAVENOUS; SUBCUTANEOUS at 07:21

## 2020-04-15 RX ADMIN — ENOXAPARIN SODIUM 40 MG: 40 INJECTION SUBCUTANEOUS at 21:02

## 2020-04-15 RX ADMIN — MAGNESIUM HYDROXIDE 30 ML: 400 SUSPENSION ORAL at 21:02

## 2020-04-15 RX ADMIN — FLUOXETINE 20 MG: 20 CAPSULE ORAL at 09:24

## 2020-04-15 RX ADMIN — SODIUM CHLORIDE: 9 INJECTION, SOLUTION INTRAVENOUS at 21:04

## 2020-04-15 RX ADMIN — OXYCODONE HYDROCHLORIDE 10 MG: 10 TABLET ORAL at 17:44

## 2020-04-15 RX ADMIN — OXYCODONE HYDROCHLORIDE 10 MG: 10 TABLET ORAL at 22:35

## 2020-04-15 RX ADMIN — HYDROMORPHONE HYDROCHLORIDE 0.5 MG: 1 INJECTION, SOLUTION INTRAMUSCULAR; INTRAVENOUS; SUBCUTANEOUS at 18:44

## 2020-04-15 RX ADMIN — PANTOPRAZOLE SODIUM 40 MG: 40 INJECTION, POWDER, FOR SOLUTION INTRAVENOUS at 09:24

## 2020-04-15 RX ADMIN — ZOLPIDEM TARTRATE 10 MG: 5 TABLET ORAL at 21:09

## 2020-04-15 RX ADMIN — HYDROMORPHONE HYDROCHLORIDE 0.5 MG: 1 INJECTION, SOLUTION INTRAMUSCULAR; INTRAVENOUS; SUBCUTANEOUS at 03:32

## 2020-04-15 RX ADMIN — HYDROMORPHONE HYDROCHLORIDE 0.5 MG: 1 INJECTION, SOLUTION INTRAMUSCULAR; INTRAVENOUS; SUBCUTANEOUS at 13:25

## 2020-04-15 RX ADMIN — SODIUM CHLORIDE: 9 INJECTION, SOLUTION INTRAVENOUS at 04:52

## 2020-04-15 RX ADMIN — SODIUM CHLORIDE, PRESERVATIVE FREE 10 ML: 5 INJECTION INTRAVENOUS at 21:03

## 2020-04-15 RX ADMIN — HYDROMORPHONE HYDROCHLORIDE 0.5 MG: 1 INJECTION, SOLUTION INTRAMUSCULAR; INTRAVENOUS; SUBCUTANEOUS at 23:27

## 2020-04-15 RX ADMIN — OXYCODONE HYDROCHLORIDE 10 MG: 10 TABLET ORAL at 04:52

## 2020-04-15 RX ADMIN — Medication 10 ML: at 09:32

## 2020-04-15 RX ADMIN — HYDROMORPHONE HYDROCHLORIDE 0.5 MG: 1 INJECTION, SOLUTION INTRAMUSCULAR; INTRAVENOUS; SUBCUTANEOUS at 15:22

## 2020-04-15 RX ADMIN — MAGNESIUM HYDROXIDE 30 ML: 400 SUSPENSION ORAL at 09:24

## 2020-04-15 ASSESSMENT — PAIN SCALES - GENERAL
PAINLEVEL_OUTOF10: 8
PAINLEVEL_OUTOF10: 8
PAINLEVEL_OUTOF10: 7
PAINLEVEL_OUTOF10: 8
PAINLEVEL_OUTOF10: 7
PAINLEVEL_OUTOF10: 8
PAINLEVEL_OUTOF10: 7
PAINLEVEL_OUTOF10: 8
PAINLEVEL_OUTOF10: 9
PAINLEVEL_OUTOF10: 8
PAINLEVEL_OUTOF10: 8
PAINLEVEL_OUTOF10: 7
PAINLEVEL_OUTOF10: 8
PAINLEVEL_OUTOF10: 7

## 2020-04-15 ASSESSMENT — PAIN DESCRIPTION - ONSET
ONSET: ON-GOING

## 2020-04-15 ASSESSMENT — PAIN DESCRIPTION - PAIN TYPE
TYPE: ACUTE PAIN

## 2020-04-15 ASSESSMENT — PAIN DESCRIPTION - ORIENTATION
ORIENTATION: LEFT

## 2020-04-15 ASSESSMENT — PAIN SCALES - WONG BAKER

## 2020-04-15 ASSESSMENT — PAIN DESCRIPTION - PROGRESSION

## 2020-04-15 ASSESSMENT — PAIN DESCRIPTION - DESCRIPTORS
DESCRIPTORS: ACHING;CONSTANT
DESCRIPTORS: ACHING
DESCRIPTORS: ACHING;CONSTANT
DESCRIPTORS: ACHING
DESCRIPTORS: ACHING;CONSTANT
DESCRIPTORS: ACHING
DESCRIPTORS: ACHING
DESCRIPTORS: ACHING;CONSTANT
DESCRIPTORS: ACHING;CONSTANT
DESCRIPTORS: ACHING
DESCRIPTORS: ACHING;CONSTANT
DESCRIPTORS: ACHING;CONSTANT
DESCRIPTORS: ACHING
DESCRIPTORS: ACHING;CONSTANT
DESCRIPTORS: ACHING
DESCRIPTORS: ACHING;CONSTANT
DESCRIPTORS: ACHING
DESCRIPTORS: ACHING;CONSTANT

## 2020-04-15 ASSESSMENT — PAIN DESCRIPTION - FREQUENCY
FREQUENCY: CONTINUOUS

## 2020-04-15 ASSESSMENT — PAIN - FUNCTIONAL ASSESSMENT
PAIN_FUNCTIONAL_ASSESSMENT: PREVENTS OR INTERFERES SOME ACTIVE ACTIVITIES AND ADLS

## 2020-04-15 ASSESSMENT — PAIN DESCRIPTION - LOCATION
LOCATION: ABDOMEN

## 2020-04-15 NOTE — PLAN OF CARE
Problem: Falls - Risk of:  Goal: Will remain free from falls  Description: Will remain free from falls  Outcome: Ongoing  Note: Fall risk assessment completed . Fall precautions in place, bed alarm on, side rails 2/4 up, call light in reach, educated pt on calling for assistance when needed, room clear of clutter. Pt verbalized understanding. Problem: Pain:  Goal: Pain level will decrease  Description: Pain level will decrease  Outcome: Ongoing  Note: Pain /discomfort being managed with PRN analgesics per MD orders. Patient able to express presence and absence of pain and rate pain appropriately using numerical scale. Problem: Skin Integrity:  Goal: Will show no infection signs and symptoms  Description: Will show no infection signs and symptoms  Outcome: Ongoing  Note: Patient is alert and oriented, afebrile, has manageable complaints of pain, skin is intact and appropriate for ethnicity in color    Goal: Absence of new skin breakdown  Description: Absence of new skin breakdown  Outcome: Ongoing  Note: Andrei score assessed. Patient able to ambulate and turn self and repositioned patient Q2H and assessed skin. Educated patient on importance of repositioning to prevent skin issues.
Problem: Falls - Risk of:  Goal: Will remain free from falls  Description: Will remain free from falls  Outcome: Ongoing  Note: Fall risk assessment completed. Fall precautions in place. Call light within reach. Pt educated on calling for assistance before getting up. Walkway free of clutter. Will continue to monitor. Electronically signed by Kathy Mcnally RN on 4/13/2020 at 1:26 PM      Problem: Pain:  Goal: Pain level will decrease  Description: Pain level will decrease  Outcome: Ongoing  Note: Educated patient on pain management. Will assess patients pain level per unit protocol, and provide pain management measures as needed. Electronically signed by Kathy Mcnally RN on 4/13/2020 at 1:26 PM      Problem: Skin Integrity:  Goal: Will show no infection signs and symptoms  Description: Will show no infection signs and symptoms  Outcome: Ongoing  Note: Will monitor skin and mucous membranes. Will turn patient every 2 hours, monitor for friction and sheering, and change dressings as needed. Will preform skin assessment every shift.    Electronically signed by Kathy Mcnally RN on 4/13/2020 at 1:27 PM
signed by Hiram Munoz RN on 4/14/2020 at 2:44 PM   Goal: Absence of new skin breakdown  Description: Absence of new skin breakdown  Outcome: Ongoing  Note: Andrei score assessed. Patient able to ambulate and turn self. Repositioned patient Q2H and assessed skin. Educated patient on importance of repositioning to prevent skin issues.

## 2020-04-16 VITALS
TEMPERATURE: 97.8 F | HEIGHT: 72 IN | BODY MASS INDEX: 32.23 KG/M2 | RESPIRATION RATE: 16 BRPM | SYSTOLIC BLOOD PRESSURE: 125 MMHG | WEIGHT: 238 LBS | OXYGEN SATURATION: 94 % | HEART RATE: 82 BPM | DIASTOLIC BLOOD PRESSURE: 73 MMHG

## 2020-04-16 PROBLEM — K56.600 PARTIAL SMALL BOWEL OBSTRUCTION (HCC): Status: ACTIVE | Noted: 2020-04-03

## 2020-04-16 LAB
GLUCOSE BLD-MCNC: 81 MG/DL (ref 70–99)
GLUCOSE BLD-MCNC: 82 MG/DL (ref 70–99)
PERFORMED ON: NORMAL
PERFORMED ON: NORMAL

## 2020-04-16 PROCEDURE — 6370000000 HC RX 637 (ALT 250 FOR IP): Performed by: SURGERY

## 2020-04-16 PROCEDURE — APPSS30 APP SPLIT SHARED TIME 16-30 MINUTES: Performed by: NURSE PRACTITIONER

## 2020-04-16 PROCEDURE — C9113 INJ PANTOPRAZOLE SODIUM, VIA: HCPCS | Performed by: SURGERY

## 2020-04-16 PROCEDURE — 6360000002 HC RX W HCPCS: Performed by: SURGERY

## 2020-04-16 PROCEDURE — 94760 N-INVAS EAR/PLS OXIMETRY 1: CPT

## 2020-04-16 PROCEDURE — APPNB30 APP NON BILLABLE TIME 0-30 MINS: Performed by: NURSE PRACTITIONER

## 2020-04-16 PROCEDURE — 2580000003 HC RX 258: Performed by: SURGERY

## 2020-04-16 RX ADMIN — PANTOPRAZOLE SODIUM 40 MG: 40 INJECTION, POWDER, FOR SOLUTION INTRAVENOUS at 09:07

## 2020-04-16 RX ADMIN — HYDROMORPHONE HYDROCHLORIDE 0.5 MG: 1 INJECTION, SOLUTION INTRAMUSCULAR; INTRAVENOUS; SUBCUTANEOUS at 01:30

## 2020-04-16 RX ADMIN — OXYCODONE HYDROCHLORIDE 5 MG: 5 TABLET ORAL at 02:33

## 2020-04-16 RX ADMIN — OXYCODONE HYDROCHLORIDE 10 MG: 10 TABLET ORAL at 13:45

## 2020-04-16 RX ADMIN — OXYCODONE HYDROCHLORIDE 10 MG: 10 TABLET ORAL at 09:03

## 2020-04-16 RX ADMIN — FLUOXETINE 20 MG: 20 CAPSULE ORAL at 09:03

## 2020-04-16 RX ADMIN — SODIUM CHLORIDE, PRESERVATIVE FREE 10 ML: 5 INJECTION INTRAVENOUS at 09:07

## 2020-04-16 RX ADMIN — HYDROMORPHONE HYDROCHLORIDE 0.5 MG: 1 INJECTION, SOLUTION INTRAMUSCULAR; INTRAVENOUS; SUBCUTANEOUS at 09:54

## 2020-04-16 RX ADMIN — MAGNESIUM HYDROXIDE 30 ML: 400 SUSPENSION ORAL at 09:03

## 2020-04-16 RX ADMIN — SODIUM CHLORIDE: 9 INJECTION, SOLUTION INTRAVENOUS at 04:56

## 2020-04-16 RX ADMIN — Medication 10 ML: at 09:07

## 2020-04-16 RX ADMIN — HYDROMORPHONE HYDROCHLORIDE 0.5 MG: 1 INJECTION, SOLUTION INTRAMUSCULAR; INTRAVENOUS; SUBCUTANEOUS at 03:55

## 2020-04-16 ASSESSMENT — PAIN SCALES - WONG BAKER
WONGBAKER_NUMERICALRESPONSE: 0
WONGBAKER_NUMERICALRESPONSE: 4
WONGBAKER_NUMERICALRESPONSE: 0
WONGBAKER_NUMERICALRESPONSE: 4
WONGBAKER_NUMERICALRESPONSE: 0

## 2020-04-16 ASSESSMENT — PAIN DESCRIPTION - DESCRIPTORS
DESCRIPTORS: ACHING;CONSTANT
DESCRIPTORS: ACHING
DESCRIPTORS: ACHING;CONSTANT
DESCRIPTORS: ACHING;CONSTANT
DESCRIPTORS: ACHING
DESCRIPTORS: ACHING;CONSTANT
DESCRIPTORS: ACHING;CONSTANT

## 2020-04-16 ASSESSMENT — PAIN SCALES - GENERAL
PAINLEVEL_OUTOF10: 7
PAINLEVEL_OUTOF10: 8
PAINLEVEL_OUTOF10: 7
PAINLEVEL_OUTOF10: 8
PAINLEVEL_OUTOF10: 7
PAINLEVEL_OUTOF10: 6
PAINLEVEL_OUTOF10: 7
PAINLEVEL_OUTOF10: 8
PAINLEVEL_OUTOF10: 6

## 2020-04-16 ASSESSMENT — PAIN DESCRIPTION - FREQUENCY
FREQUENCY: CONTINUOUS

## 2020-04-16 ASSESSMENT — PAIN DESCRIPTION - PROGRESSION
CLINICAL_PROGRESSION: NOT CHANGED
CLINICAL_PROGRESSION: GRADUALLY WORSENING
CLINICAL_PROGRESSION: NOT CHANGED
CLINICAL_PROGRESSION: NOT CHANGED
CLINICAL_PROGRESSION: GRADUALLY IMPROVING
CLINICAL_PROGRESSION: NOT CHANGED
CLINICAL_PROGRESSION: GRADUALLY IMPROVING

## 2020-04-16 ASSESSMENT — PAIN DESCRIPTION - ORIENTATION
ORIENTATION: LEFT
ORIENTATION: LEFT;LOWER
ORIENTATION: LEFT;LOWER
ORIENTATION: LEFT
ORIENTATION: LEFT
ORIENTATION: LEFT;LOWER
ORIENTATION: LEFT;LOWER
ORIENTATION: LEFT
ORIENTATION: LEFT

## 2020-04-16 ASSESSMENT — PAIN DESCRIPTION - LOCATION
LOCATION: ABDOMEN

## 2020-04-16 ASSESSMENT — PAIN DESCRIPTION - ONSET
ONSET: ON-GOING

## 2020-04-16 ASSESSMENT — PAIN DESCRIPTION - PAIN TYPE
TYPE: ACUTE PAIN

## 2020-04-16 ASSESSMENT — PAIN - FUNCTIONAL ASSESSMENT
PAIN_FUNCTIONAL_ASSESSMENT: PREVENTS OR INTERFERES SOME ACTIVE ACTIVITIES AND ADLS

## 2020-04-16 NOTE — PROGRESS NOTES
A&Ox4, able to make needs known, call light in reach. Pain /discomfort being managed with PRN analgesics per MD orders. Patient able to express presence and absence of pain and rate pain appropriately using numerical scale. Vitals signs are stable. Intake and output are being recorded, will continue to monitor.
Checking on patient Q2H for nutrition needs, hygiene needs, comfort measures, mobility, fall risk interventions, and safe environment. All precautions and interventions in place. Educated patient on use of call light and telephone. Patient verbalizes understanding. Call light/telephone in reach.
Checking on patient Q2H for nutrition needs, hygiene needs, comfort measures, mobility, fall risk interventions, and safe environment. All precautions and interventions in place. Educated patient on use of call light and telephone. Patient verbalizes understanding. Call light/telephone in reach.   Electronically signed by Soraya Delgado RN on 4/11/2020 at 4:09 PM
General and Vascular Surgery                                                           Daily Progress Note                                                             Ale Garrido PA-C     Pt Name: Raquel Ochoa [place] Record Number: [id number]  Date of Birth 1970   Today's Date: 4/13/2020      ASSESSMENT/PLAN  Partial SBO              Still with abdominal pain, no significant change   TO obtain abdominal xray's this AM   NGT in place, 350 cc output   Monitor IV pain medication, will try to get off as soon as possible              Passing flatus              Will review X-ray once available and make further decisions regarding NGT etc.    OOB and ambulate. OK to clamp NGT to allow patient to ambulate. EDUCATION  Patient educated about their illness/diagnosis, stated above, and all questions answered. We discussed the importance of nutrition, medications they are taking, and healthy lifestyle. Cassie Dieudonne has slightly improved from yesterday. Pain is well controlled. OBJECTIVE  VITALS:  height is 6' (1.829 m) and weight is 236 lb 15.9 oz (107.5 kg). His oral temperature is 98 °F (36.7 °C). His blood pressure is 168/76 (abnormal) and his pulse is 75. His respiration is 16 and oxygen saturation is 94%. VITALS:  BP (!) 168/76   Pulse 75   Temp 98 °F (36.7 °C) (Oral)   Resp 16   Ht 6' (1.829 m)   Wt 236 lb 15.9 oz (107.5 kg)   SpO2 94%   BMI 32.14 kg/m²   GENERAL: alert, no distress  ABDOMEN: tenderness present- mild,  without rebound and guarding and distention present-mild  I/O last 3 completed shifts: In: 36 [P.O.:760]  Out: 2350 [Urine:2000; Emesis/NG output:350]  No intake/output data recorded.     LABS  Recent Labs     04/11/20  1113 04/12/20  0545 04/12/20  0546   WBC 7.3  --  7.3   HGB 13.6  --  13.4*   HCT 42.3  --  42.6     --  392    139  --    K 4.8 4.3  --     105  --    CO2 23 23  --    BUN 9 7
General and Vascular Surgery                                                           Daily Progress Note                                                             Antonella Pugh PA-C     Pt Name: Raquel Ochoa [place] Record Number: [id number]  Date of Birth 1970   Today's Date: 4/14/2020      ASSESSMENT/PLAN  Partial SBO              Still with abdominal pain, no significant change   Aabdominal xray's this AM   NGT in place, 200 cc output, some nausea overnight   Monitor IV pain medication, will try to get off as soon as possible              Passing flatus, Denies any BM              Will review X-ray once available and make further decisions regarding NGT etc.    OOB and ambulate. OK to clamp NGT to allow patient to ambulate. EDUCATION  Patient educated about their illness/diagnosis, stated above, and all questions answered. We discussed the importance of nutrition, medications they are taking, and healthy lifestyle. Romina Daniel has slightly improved from yesterday. Pain is well controlled. OBJECTIVE  VITALS:  height is 6' (1.829 m) and weight is 236 lb 5.3 oz (107.2 kg). His oral temperature is 97.9 °F (36.6 °C). His blood pressure is 132/77 and his pulse is 51. His respiration is 16 and oxygen saturation is 93%. VITALS:  /77   Pulse 51   Temp 97.9 °F (36.6 °C) (Oral)   Resp 16   Ht 6' (1.829 m)   Wt 236 lb 5.3 oz (107.2 kg)   SpO2 93%   BMI 32.05 kg/m²   GENERAL: alert, no distress  ABDOMEN: tenderness present- mild,  without rebound and guarding and distention present-mild  I/O last 3 completed shifts:   In: 4980 [P.O.:60; I.V.:4920]  Out: 3965 [Urine:1375; Emesis/NG output:200]  I/O this shift:  In: -   Out: 700 [Urine:700]    LABS  Recent Labs     04/11/20  1113 04/12/20  0545 04/12/20  0546   WBC 7.3  --  7.3   HGB 13.6  --  13.4*   HCT 42.3  --  42.6     --  392    139  --    K 4.8 4.3  --    CL
Patient is alert & oriented x4, ual no weakness noted, 2/4 bed rails up, bed in lowest position, fall precautions in place, call light within reach. Tenderness to LLQ noted, pain medication administered see eMAR. Will continue to monitor and reassess.   Electronically signed by Kamille Nix RN on 4/16/2020 at 3:06 PM
Patient is resting in bed, awake and quiet. Room air. Side rails are up x2. Fall precautions are in place. Bed is in lowest position. Call light is in reach. Patient is UAL. NG tube remains in place. Will continue to monitor patient per unit protocols.  Electronically signed by Shani Diaz RN on 4/12/2020 at 4:44 PM
Patient resting quietly in bed watching movies. Alert and oriented. Rates pain 8/10. Pain medication given. Bowel sounds hypoactive. Dressing changed on right knee surgical site. Dr. Paramjit Andrews at bedside. NG tube clamed at 0945. Will remain clamped for 4 hours unless patient feels sick. Check residual at 1330 and notify Dr. Paramjit Andrews. Patient tolerating ice chips and popsicles. Call light and belongings within reach. Will continue to monitor. Patient able to make needs known.
Pt A&O in the bed. Pt remains NPO at this time. AM medication administered with a small sip of water. Tolerated. Wall suction turned off prior to oral medications. Will monitor. Pt continues with pain in the abdomen. PRN medication administered. Will monitor. Call light within reach. Able to make needs known. Fall precautions in place. Will monitor.  Electronically signed by Dionisio Mujica RN on 4/15/2020 at 11:44 AM
Report given to me by Archana Claire
MPV 9.2 04/12/2020     CMP:    Lab Results   Component Value Date     04/12/2020    K 4.3 04/12/2020     04/12/2020    CO2 23 04/12/2020    BUN 7 04/12/2020    CREATININE 0.9 04/12/2020    GFRAA >60 04/12/2020    AGRATIO 1.3 04/11/2020    LABGLOM >60 04/12/2020    GLUCOSE 94 04/12/2020    GLUCOSE 84 01/07/2015    PROT 7.0 04/11/2020    PROT 6.7 01/07/2015    LABALBU 3.9 04/11/2020    CALCIUM 8.9 04/12/2020    BILITOT 0.3 04/11/2020    ALKPHOS 152 04/11/2020    AST 16 04/11/2020    ALT 10 04/11/2020         Adair SCOTT 11:16 AM 4/16/2020   [facility]  Office: [phone]   Electronically signed by LILLIE Wong CNP on 4/16/2020 at 11:09 AM
Only     Hx of Barretts esophagus     Active Problems:    SBO (small bowel obstruction) (HCC)  Resolved Problems:    * No resolved hospital problems. *    Blood pressure 132/66, pulse 74, temperature 98.2 °F (36.8 °C), temperature source Oral, resp. rate 16, height 6' (1.829 m), weight 238 lb 5.1 oz (108.1 kg), SpO2 97 %. Subjective:   Diet: Dietary issues: NG in place. Patient reports no nausea. Activity level: Returning to normal.    Pain control: Partially controlled. Objective:  Vital signs (most recent): Blood pressure 132/66, pulse 74, temperature 98.2 °F (36.8 °C), temperature source Oral, resp. rate 16, height 6' (1.829 m), weight 238 lb 5.1 oz (108.1 kg), SpO2 97 %. General appearance: Uncomfortable and in no acute distress. Abdomen: Abdomen is soft. There is distension. Tenderness: There is generalized tenderness.       Assessment & Plan    Partial SBO   Still with cramping pain which is unchanged from admission   Passing some flatus   Xray today with ongoing bowel dilation although he does have gas throughout the colon   Continue NG today     Electronically signed by Lyly Russ MD on 4/12/2020 at 10:41 AM    Lyly Russ MD  4/12/2020

## 2020-04-16 NOTE — CONSULTS
[facility] Orthopedic Surgery  Consult Note    This patient is seen in consultation at the request of LYNN Moss NP    Reason for Consult:  Right knee infection, followup    CHIEF COMPLAINT:  Right knee infection    History Obtained From:  patient, electronic medical record    HISTORY OF PRESENT ILLNESS:    The patient is a 53 y.o. male who presents post right TKA 2013 with infection Dcc 2019 requiring spacer exchange. Repeat I and D in Feb 2020 per Dr Miguel Davila with wound vac placed. On recent admission his wound vac was removed and orders for Mepilex AG dressing only was ordered. He still has Mepilex AG on from then and says he thinks it needs to be changed. Pain is described in right knee and with the intensity of moderate. Pain is described as aching. Discomfort is persistent. He is walking without knee immobilizer that was ordered by Dr Miguel Davila because \"the brace cut off the wound vac suction tubing\". He does not have the brace here at the hospital. I was consulted to see pt re dressing changes and wound care right knee.     Past Medical History:        Diagnosis Date    Alcoholism [facility])     last drink 2015    Allergic migraine with status migrainosus     Allergic rhinitis, seasonal     Anemia     Arthritis     right knee    Vaughn's esophagus     Benign intracranial hypertension     Cancer (HCC)     renal     Carpal tunnel syndrome     Chronic pain     Depression     Depression     GERD (gastroesophageal reflux disease)     Headache(784.0)     History of blood transfusion     History of tobacco use     Quit 7/2014    Migraine     chronic for 1 year    Morbid obesity ([address]     Movement disorder     Onychomycosis     Sleep apnea, obstructive     Severe uses cpap stop bang 6    Unspecified cerebral artery occlusion with cerebral infarction 2014    slight weakness in left arm    Wears dentures     full set    Wears glasses        Past Surgical History:        Procedure Laterality Date    ABDOMEN SURGERY      gastric bypass    ABDOMEN SURGERY  2016    repair of recurrent incisional hernia with mesh, removal of old mesh, bilateral component separation    ABDOMINAL EXPLORATION SURGERY  16    exp lap, lysis of adhesions, small bowel resection    CARPAL TUNNEL RELEASE      bilat    DILATATION, ESOPHAGUS      ENDOSCOPY, COLON, DIAGNOSTIC      EYE SURGERY      GASTRIC BYPASS SURGERY  2009    Has lost about 200 pounds.  HERNIA REPAIR  3-    ventral    JOINT REPLACEMENT      KIDNEY REMOVAL Left 2018    KNEE ARTHROSCOPY Right 2013    Dr.Robert Sanders     KNEE ARTHROSCOPY Right 12    RIGHT KNEE ARTHROSCOPY WITH CHONDROPLASTY    KNEE SURGERY  2012    right, arthroscopy    KNEE SURGERY Right 2020    INCISION AND DRAINAGE RIGHT KNEE AND WOULD VAC PLACEMENT performed by Dilcia Quinn MD at [address]      OTHER SURGICAL HISTORY Left 2016    CT biopsy ablasion left kidney    OTHER SURGICAL HISTORY      small intestine 12 inch removed, 2 hernia surgeries, another surgery to drain infection from incision.      REVISION TOTAL KNEE ARTHROPLASTY Right 2019    RIGHT REVISION TIBIA TOTAL KNEE REPLACEMENT performed by Dilcia Quinn MD at [address] [facility] Right 2020    RIGHT KNEE IRRIGATION AND DEBRIDEMENT WITH POLY EXCHANGE performed by Terrell Santiago MD at [address] [facility]  10/15/13    RIGHT    UPPER GASTROINTESTINAL ENDOSCOPY  2016    UPPER GASTROINTESTINAL ENDOSCOPY  2018       Social History     Tobacco Use    Smoking status: Former Smoker     Packs/day: 0.50     Years: 25.00     Pack years: 12.50     Types: Cigarettes     Last attempt to quit: 2017     Years since quittin.7    Smokeless tobacco: Never Used   Substance Use Topics    Alcohol use: No     Alcohol/week: 0.0 standard drinks     Comment: last drink

## 2020-04-17 ENCOUNTER — CARE COORDINATION (OUTPATIENT)
Dept: CASE MANAGEMENT | Age: 50
End: 2020-04-17

## 2020-04-20 NOTE — DISCHARGE SUMMARY
acute onset abdominal pain, with associated retching. Passing some flatus but no BM. Of note he takes chronic narcotic use due to arthritis. An NGT was placed at admission and his pain was monitored and controlled. He is on chronic narcotics so it is difficult to get his pain to the level that he wants for comfort. During his hospital stay he did have follow up imaging that did show him to be resolving his bowel obstruction. As the imaging and his symptoms improved the NGT was removed and PO intake was started. He tolerated the advancement in his diet and his bowel function did return. His continued to do well throughout his hospital stay and was discharged home in stable condition. Discharge Medications:  Discharge Medication List as of 4/7/2020  2:47 PM      CONTINUE these medications which have NOT CHANGED    Details   cyclobenzaprine (FLEXERIL) 10 MG tablet Take 1 tablet by mouth nightly as needed for Muscle spasms, Disp-30 tablet, R-0Normal      oxyCODONE (ROXICODONE) 5 MG immediate release tablet Take 1 tablet by mouth every 6 hours as needed for Pain for up to 7 days. , Disp-28 tablet, R-0Normal      acetaminophen (TYLENOL) 500 MG tablet Take 2 tablets by mouth 3 times daily (with meals), Disp-42 tablet, R-0Print      traZODone (DESYREL) 100 MG tablet Take 2 tablets by mouth nightly, Disp-60 tablet, R-0Normal      zolpidem (AMBIEN) 10 MG tablet Take 1 tablet by mouth nightly as needed (1 po hs) for up to 90 days. , Disp-90 tablet, R-1Normal      FLUoxetine (PROZAC) 20 MG capsule TAKE 1 CAPSULE BY MOUTH DAILY, Disp-90 capsule, R-0Normal      pantoprazole (PROTONIX) 40 MG tablet Take 1 tablet by mouth 2 times dailyHistorical Med      aspirin EC 81 MG EC tablet Take 1 tablet by mouth 2 times daily for 14 days Please avoid missing doses. , Disp-28 tablet, R-0Print      dicyclomine (BENTYL) 20 MG tablet Take 40 mg by mouth 2 times daily as needed, R-0Historical Med      atorvastatin (LIPITOR) 40 MG tablet TAKE 1 TABLET BY MOUTH EVERY NIGHT AT BEDTIME, Disp-90 tablet, R-1Normal      sildenafil (REVATIO) 20 MG tablet Take 4 tablets by mouth as needed (30 min prior to intercourse), Disp-30 tablet, R-5Normal      calcium-vitamin D (OSCAL 500/200 D-3) 500-200 MG-UNIT per tablet Take 1 tablet by mouth 2 times daily Historical Med      Multiple Vitamin TABS Take 1 tablet by mouth daily              Discharged to:  home    Instruction:  The patient is instructed to return to the hospital or call the office for fevers > 101.5F, inability to tolerate a diet, or unexpected pain. Surgery specific discharge instruction sheet was provided to the patient.   Diet: regular diet   Activity: activity as tolerated    Follow up:  Dr. Vinod Charles PRN     Electronically signed by JEMAL Colvin on 4/20/2020 at 7:45 AM

## 2020-04-23 ENCOUNTER — CARE COORDINATION (OUTPATIENT)
Dept: CASE MANAGEMENT | Age: 50
End: 2020-04-23

## 2020-04-30 NOTE — DISCHARGE SUMMARY
Physician Discharge Summary     Patient ID:  Delmer Hartman  [id number]  88 y.o.  1970    Admit date: 4/11/2020    Discharge date and time: 4/16/2020  2:40 PM     Admitting Physician: Radha Alcala MD     Discharge Physician: same    Admission Diagnoses: SBO (small bowel obstruction) [facility]) [K56.609]    Discharge Diagnoses: same    Admission Condition: fair    Discharged Condition: good    Indication for Admission: SBO    Hospital Course:   Partial SBO              Feeling better today              NGT out, tolerating clears. Advance to general diet. DC IV pain medication              Passing flatus, + BM              OOB and ambulate. Consults: none    Significant Diagnostic Studies:   XR ABDOMEN (2 VIEWS)   Final Result   Nonspecific bowel gas pattern representing ileus or developing partial small   bowel obstruction with localized dilated small bowel left mid abdomen. XR ABDOMEN (2 VIEWS)   Final Result   Enteric tube projects just beyond the GE junction and should be advanced 5-10   cm for better positioning. Diffuse bowel distention with presence of distal gas possibly from resolving   obstruction versus ileus. Continued follow-up is advised. XR ABDOMEN (2 VIEWS)   Final Result   Persistent mild small bowel dilation from obstruction. XR CHEST 1 VW   Final Result   Satisfactory NG tube placement. CT ABDOMEN PELVIS W IV CONTRAST Additional Contrast? None   Final Result   1. Cholelithiasis. 2. Partial proximal to mid left-sided small bowel obstruction. Disposition: home    In process/preliminary results:  Outstanding Order Results     No orders found from 3/13/2020 to 4/12/2020.           Patient Instructions:   Discharge Medication List as of 4/16/2020  3:41 PM      CONTINUE these medications which have NOT CHANGED    Details   oxyCODONE (ROXICODONE) 5 MG immediate release tablet Take 5 mg by mouth every 6 hours as needed for

## 2020-05-01 ENCOUNTER — CARE COORDINATION (OUTPATIENT)
Dept: CASE MANAGEMENT | Age: 50
End: 2020-05-01

## 2020-05-01 NOTE — CARE COORDINATION
medical issues or concerns.     Follow Up  Future Appointments   Date Time Provider Timothy Beal   5/7/2020  8:15 AM MD KATIE Clay ORTHO DAR   9/11/2020 12:00 PM LILLIE Rene - CNP FF SLEEP MED DAR Rudolph RN

## 2020-05-03 ENCOUNTER — APPOINTMENT (OUTPATIENT)
Dept: CT IMAGING | Age: 50
End: 2020-05-03
Payer: COMMERCIAL

## 2020-05-03 ENCOUNTER — HOSPITAL ENCOUNTER (EMERGENCY)
Age: 50
Discharge: HOME | End: 2020-05-03
Payer: COMMERCIAL

## 2020-05-03 VITALS
OXYGEN SATURATION: 98 % | TEMPERATURE: 98.6 F | BODY MASS INDEX: 32.51 KG/M2 | HEART RATE: 62 BPM | HEIGHT: 72 IN | SYSTOLIC BLOOD PRESSURE: 118 MMHG | WEIGHT: 240 LBS | RESPIRATION RATE: 22 BRPM | DIASTOLIC BLOOD PRESSURE: 77 MMHG

## 2020-05-03 LAB
A/G RATIO: 1.4 (ref 1.1–2.2)
ALBUMIN SERPL-MCNC: 3.8 G/DL (ref 3.4–5)
ALP BLD-CCNC: 124 U/L (ref 40–129)
ALT SERPL-CCNC: 11 U/L (ref 10–40)
ANION GAP SERPL CALCULATED.3IONS-SCNC: 13 MMOL/L (ref 3–16)
AST SERPL-CCNC: 13 U/L (ref 15–37)
BASOPHILS ABSOLUTE: 0.1 K/UL (ref 0–0.2)
BASOPHILS RELATIVE PERCENT: 1.6 %
BILIRUB SERPL-MCNC: 0.3 MG/DL (ref 0–1)
BILIRUBIN URINE: NEGATIVE
BLOOD, URINE: NEGATIVE
BUN BLDV-MCNC: 6 MG/DL (ref 7–20)
CALCIUM SERPL-MCNC: 9.2 MG/DL (ref 8.3–10.6)
CHLORIDE BLD-SCNC: 104 MMOL/L (ref 99–110)
CLARITY: CLEAR
CO2: 24 MMOL/L (ref 21–32)
COLOR: YELLOW
CREAT SERPL-MCNC: 1 MG/DL (ref 0.9–1.3)
EOSINOPHILS ABSOLUTE: 0.3 K/UL (ref 0–0.6)
EOSINOPHILS RELATIVE PERCENT: 4.4 %
GFR AFRICAN AMERICAN: >60
GFR NON-AFRICAN AMERICAN: >60
GLOBULIN: 2.8 G/DL
GLUCOSE BLD-MCNC: 88 MG/DL (ref 70–99)
GLUCOSE URINE: NEGATIVE MG/DL
HCT VFR BLD CALC: 41.6 % (ref 40.5–52.5)
HEMOGLOBIN: 13.4 G/DL (ref 13.5–17.5)
KETONES, URINE: NEGATIVE MG/DL
LACTIC ACID: 0.8 MMOL/L (ref 0.4–2)
LEUKOCYTE ESTERASE, URINE: NEGATIVE
LYMPHOCYTES ABSOLUTE: 2.7 K/UL (ref 1–5.1)
LYMPHOCYTES RELATIVE PERCENT: 34.7 %
MCH RBC QN AUTO: 27.9 PG (ref 26–34)
MCHC RBC AUTO-ENTMCNC: 32.2 G/DL (ref 31–36)
MCV RBC AUTO: 86.5 FL (ref 80–100)
MICROSCOPIC EXAMINATION: NORMAL
MONOCYTES ABSOLUTE: 0.5 K/UL (ref 0–1.3)
MONOCYTES RELATIVE PERCENT: 7 %
NEUTROPHILS ABSOLUTE: 4.1 K/UL (ref 1.7–7.7)
NEUTROPHILS RELATIVE PERCENT: 52.3 %
NITRITE, URINE: NEGATIVE
PDW BLD-RTO: 17.3 % (ref 12.4–15.4)
PH UA: 6.5 (ref 5–8)
PLATELET # BLD: 402 K/UL (ref 135–450)
PMV BLD AUTO: 8.5 FL (ref 5–10.5)
POTASSIUM SERPL-SCNC: 4.6 MMOL/L (ref 3.5–5.1)
PROTEIN UA: NEGATIVE MG/DL
RBC # BLD: 4.8 M/UL (ref 4.2–5.9)
SODIUM BLD-SCNC: 141 MMOL/L (ref 136–145)
SPECIFIC GRAVITY UA: 1.01 (ref 1–1.03)
TOTAL PROTEIN: 6.6 G/DL (ref 6.4–8.2)
URINE REFLEX TO CULTURE: NORMAL
URINE TYPE: NORMAL
UROBILINOGEN, URINE: 1 E.U./DL
WBC # BLD: 7.9 K/UL (ref 4–11)

## 2020-05-03 PROCEDURE — 74177 CT ABD & PELVIS W/CONTRAST: CPT

## 2020-05-03 PROCEDURE — 80053 COMPREHEN METABOLIC PANEL: CPT

## 2020-05-03 PROCEDURE — 81003 URINALYSIS AUTO W/O SCOPE: CPT

## 2020-05-03 PROCEDURE — 96374 THER/PROPH/DIAG INJ IV PUSH: CPT

## 2020-05-03 PROCEDURE — 2500000003 HC RX 250 WO HCPCS: Performed by: NURSE PRACTITIONER

## 2020-05-03 PROCEDURE — 6360000004 HC RX CONTRAST MEDICATION

## 2020-05-03 PROCEDURE — 99284 EMERGENCY DEPT VISIT MOD MDM: CPT

## 2020-05-03 PROCEDURE — 83605 ASSAY OF LACTIC ACID: CPT

## 2020-05-03 PROCEDURE — 85025 COMPLETE CBC W/AUTO DIFF WBC: CPT

## 2020-05-03 RX ORDER — OXYCODONE HYDROCHLORIDE AND ACETAMINOPHEN 5; 325 MG/1; MG/1
1 TABLET ORAL EVERY 8 HOURS PRN
Qty: 12 TABLET | Refills: 0 | Status: SHIPPED | OUTPATIENT
Start: 2020-05-03 | End: 2020-05-06

## 2020-05-03 RX ORDER — KETAMINE HCL IN NACL, ISO-OSM 100MG/10ML
0.1 SYRINGE (ML) INJECTION ONCE
Status: COMPLETED | OUTPATIENT
Start: 2020-05-03 | End: 2020-05-03

## 2020-05-03 RX ADMIN — IOPAMIDOL 75 ML: 755 INJECTION, SOLUTION INTRAVENOUS at 15:41

## 2020-05-03 RX ADMIN — Medication 10.9 MG: at 15:05

## 2020-05-03 ASSESSMENT — PAIN SCALES - GENERAL
PAINLEVEL_OUTOF10: 0
PAINLEVEL_OUTOF10: 9
PAINLEVEL_OUTOF10: 9

## 2020-05-03 ASSESSMENT — ENCOUNTER SYMPTOMS
DIARRHEA: 0
VOMITING: 0
NAUSEA: 0
ABDOMINAL PAIN: 1
RESPIRATORY NEGATIVE: 1
CONSTIPATION: 0
ABDOMINAL DISTENTION: 0

## 2020-05-03 ASSESSMENT — PAIN DESCRIPTION - FREQUENCY: FREQUENCY: CONTINUOUS

## 2020-05-03 ASSESSMENT — PAIN DESCRIPTION - PAIN TYPE: TYPE: ACUTE PAIN

## 2020-05-03 ASSESSMENT — PAIN DESCRIPTION - LOCATION: LOCATION: ABDOMEN

## 2020-05-03 ASSESSMENT — PAIN - FUNCTIONAL ASSESSMENT: PAIN_FUNCTIONAL_ASSESSMENT: PREVENTS OR INTERFERES WITH MANY ACTIVE NOT PASSIVE ACTIVITIES

## 2020-05-03 ASSESSMENT — PAIN DESCRIPTION - PROGRESSION: CLINICAL_PROGRESSION: GRADUALLY WORSENING

## 2020-05-03 ASSESSMENT — PAIN DESCRIPTION - ORIENTATION: ORIENTATION: LOWER

## 2020-05-03 ASSESSMENT — PAIN DESCRIPTION - DESCRIPTORS: DESCRIPTORS: THROBBING;SHARP

## 2020-05-04 ENCOUNTER — CARE COORDINATION (OUTPATIENT)
Dept: CARE COORDINATION | Age: 50
End: 2020-05-04

## 2020-05-07 ENCOUNTER — OFFICE VISIT (OUTPATIENT)
Dept: ORTHOPEDIC SURGERY | Age: 50
End: 2020-05-07

## 2020-05-07 VITALS — TEMPERATURE: 98.3 F

## 2020-05-07 PROCEDURE — 99024 POSTOP FOLLOW-UP VISIT: CPT | Performed by: ORTHOPAEDIC SURGERY

## 2020-05-07 NOTE — PROGRESS NOTES
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  Chondromalacia of patellofemoral joint    Chronic pain syndrome    History of stroke    Gastroesophageal reflux disease with esophagitis--on daily ppi    Intractable chronic migraine without aura and with status migrainosus    Iron deficiency anemia    B12 deficiency    Anastomotic ulcer    Malignant neoplasm of left kidney (HCC) clear cell. 8/1/18 Dr Sam Ray.  Total knee replacement status, right    Failed total knee, right, initial encounter ([facility]    Infection of total knee replacement ([facility]    History of depression    History of alcoholism ([facility]    Receiving intravenous antibiotic treatment as outpatient    SBO (small bowel obstruction) ([facility]    Partial small bowel obstruction [facility])     Physical examination 50-year-old male oriented x3 temperature is 98.3. Patient has class II morbid obesity BMI of 32.55. Examination of his back shows good range of motion no step signs of instability or deep sepsis. Motor exam to the right lower extremity shows quadriceps hamstrings hip abductors and abductors foot plantar and dorsiflexors are intact 4-5 over 5. Sensation and perfusion are intact to his right foot and ankle. There are no obvious cutaneous lesions lymphedema or cellulitic processes seen today on his right lower extremity. Examination of the right knee wound shows a well-healed wound status post a wound VAC management.   Range

## 2020-05-15 ENCOUNTER — APPOINTMENT (OUTPATIENT)
Dept: CT IMAGING | Age: 50
End: 2020-05-15
Payer: COMMERCIAL

## 2020-05-15 ENCOUNTER — HOSPITAL ENCOUNTER (EMERGENCY)
Age: 50
Discharge: HOME | End: 2020-05-15
Payer: COMMERCIAL

## 2020-05-15 ENCOUNTER — APPOINTMENT (OUTPATIENT)
Dept: ULTRASOUND IMAGING | Age: 50
End: 2020-05-15
Payer: COMMERCIAL

## 2020-05-15 VITALS
HEART RATE: 52 BPM | HEIGHT: 72 IN | SYSTOLIC BLOOD PRESSURE: 116 MMHG | TEMPERATURE: 98.2 F | DIASTOLIC BLOOD PRESSURE: 52 MMHG | BODY MASS INDEX: 32.25 KG/M2 | OXYGEN SATURATION: 97 % | RESPIRATION RATE: 18 BRPM | WEIGHT: 238.1 LBS

## 2020-05-15 LAB
A/G RATIO: 1.5 (ref 1.1–2.2)
ALBUMIN SERPL-MCNC: 3.8 G/DL (ref 3.4–5)
ALP BLD-CCNC: 115 U/L (ref 40–129)
ALT SERPL-CCNC: 11 U/L (ref 10–40)
ANION GAP SERPL CALCULATED.3IONS-SCNC: 8 MMOL/L (ref 3–16)
ANISOCYTOSIS: ABNORMAL
AST SERPL-CCNC: 14 U/L (ref 15–37)
BASOPHILS ABSOLUTE: 0.2 K/UL (ref 0–0.2)
BASOPHILS RELATIVE PERCENT: 3 %
BILIRUB SERPL-MCNC: 0.3 MG/DL (ref 0–1)
BILIRUBIN URINE: NEGATIVE
BLOOD, URINE: NEGATIVE
BUN BLDV-MCNC: 7 MG/DL (ref 7–20)
BURR CELLS: ABNORMAL
CALCIUM SERPL-MCNC: 9 MG/DL (ref 8.3–10.6)
CHLORIDE BLD-SCNC: 103 MMOL/L (ref 99–110)
CLARITY: CLEAR
CO2: 24 MMOL/L (ref 21–32)
COLOR: YELLOW
CREAT SERPL-MCNC: 0.9 MG/DL (ref 0.9–1.3)
EOSINOPHILS ABSOLUTE: 0.5 K/UL (ref 0–0.6)
EOSINOPHILS RELATIVE PERCENT: 6 %
EPITHELIAL CELLS, UA: 0 /HPF (ref 0–5)
GFR AFRICAN AMERICAN: >60
GFR NON-AFRICAN AMERICAN: >60
GLOBULIN: 2.5 G/DL
GLUCOSE BLD-MCNC: 93 MG/DL (ref 70–99)
GLUCOSE URINE: NEGATIVE MG/DL
HCT VFR BLD CALC: 41.1 % (ref 40.5–52.5)
HEMOGLOBIN: 13.3 G/DL (ref 13.5–17.5)
HYALINE CASTS: 0 /LPF (ref 0–8)
KETONES, URINE: NEGATIVE MG/DL
LEUKOCYTE ESTERASE, URINE: ABNORMAL
LIPASE: 53 U/L (ref 13–60)
LYMPHOCYTES ABSOLUTE: 2.5 K/UL (ref 1–5.1)
LYMPHOCYTES RELATIVE PERCENT: 32 %
MCH RBC QN AUTO: 28 PG (ref 26–34)
MCHC RBC AUTO-ENTMCNC: 32.3 G/DL (ref 31–36)
MCV RBC AUTO: 86.5 FL (ref 80–100)
MICROSCOPIC EXAMINATION: YES
MONOCYTES ABSOLUTE: 0.1 K/UL (ref 0–1.3)
MONOCYTES RELATIVE PERCENT: 1 %
NEUTROPHILS ABSOLUTE: 4.6 K/UL (ref 1.7–7.7)
NEUTROPHILS RELATIVE PERCENT: 58 %
NITRITE, URINE: NEGATIVE
OVALOCYTES: ABNORMAL
PDW BLD-RTO: 18.1 % (ref 12.4–15.4)
PH UA: 5.5 (ref 5–8)
PLATELET # BLD: 343 K/UL (ref 135–450)
PMV BLD AUTO: 8.5 FL (ref 5–10.5)
POLYCHROMASIA: ABNORMAL
POTASSIUM REFLEX MAGNESIUM: 4.5 MMOL/L (ref 3.5–5.1)
PROTEIN UA: NEGATIVE MG/DL
RBC # BLD: 4.75 M/UL (ref 4.2–5.9)
RBC UA: 0 /HPF (ref 0–4)
SODIUM BLD-SCNC: 135 MMOL/L (ref 136–145)
SPECIFIC GRAVITY UA: 1.01 (ref 1–1.03)
TARGET CELLS: ABNORMAL
TOTAL PROTEIN: 6.3 G/DL (ref 6.4–8.2)
URINE REFLEX TO CULTURE: ABNORMAL
URINE TYPE: ABNORMAL
UROBILINOGEN, URINE: 0.2 E.U./DL
WBC # BLD: 7.9 K/UL (ref 4–11)
WBC UA: 1 /HPF (ref 0–5)

## 2020-05-15 PROCEDURE — 85025 COMPLETE CBC W/AUTO DIFF WBC: CPT

## 2020-05-15 PROCEDURE — 81001 URINALYSIS AUTO W/SCOPE: CPT

## 2020-05-15 PROCEDURE — 76705 ECHO EXAM OF ABDOMEN: CPT

## 2020-05-15 PROCEDURE — 83690 ASSAY OF LIPASE: CPT

## 2020-05-15 PROCEDURE — 6360000002 HC RX W HCPCS: Performed by: PHYSICIAN ASSISTANT

## 2020-05-15 PROCEDURE — 6360000004 HC RX CONTRAST MEDICATION: Performed by: PHYSICIAN ASSISTANT

## 2020-05-15 PROCEDURE — 2580000003 HC RX 258: Performed by: PHYSICIAN ASSISTANT

## 2020-05-15 PROCEDURE — 6370000000 HC RX 637 (ALT 250 FOR IP): Performed by: PHYSICIAN ASSISTANT

## 2020-05-15 PROCEDURE — 99284 EMERGENCY DEPT VISIT MOD MDM: CPT

## 2020-05-15 PROCEDURE — 96376 TX/PRO/DX INJ SAME DRUG ADON: CPT

## 2020-05-15 PROCEDURE — 74177 CT ABD & PELVIS W/CONTRAST: CPT

## 2020-05-15 PROCEDURE — 80053 COMPREHEN METABOLIC PANEL: CPT

## 2020-05-15 PROCEDURE — 96374 THER/PROPH/DIAG INJ IV PUSH: CPT

## 2020-05-15 RX ORDER — 0.9 % SODIUM CHLORIDE 0.9 %
1000 INTRAVENOUS SOLUTION INTRAVENOUS ONCE
Status: COMPLETED | OUTPATIENT
Start: 2020-05-15 | End: 2020-05-15

## 2020-05-15 RX ORDER — KETAMINE HCL IN NACL, ISO-OSM 100MG/10ML
0.1 SYRINGE (ML) INJECTION ONCE
Status: DISCONTINUED | OUTPATIENT
Start: 2020-05-15 | End: 2020-05-15

## 2020-05-15 RX ORDER — OXYCODONE HYDROCHLORIDE AND ACETAMINOPHEN 5; 325 MG/1; MG/1
1 TABLET ORAL ONCE
Status: COMPLETED | OUTPATIENT
Start: 2020-05-15 | End: 2020-05-15

## 2020-05-15 RX ORDER — DICYCLOMINE HYDROCHLORIDE 10 MG/1
10 CAPSULE ORAL EVERY 6 HOURS PRN
Qty: 20 CAPSULE | Refills: 0 | Status: SHIPPED | OUTPATIENT
Start: 2020-05-15 | End: 2020-10-10 | Stop reason: ALTCHOICE

## 2020-05-15 RX ORDER — HYDROCODONE BITARTRATE AND ACETAMINOPHEN 5; 325 MG/1; MG/1
1 TABLET ORAL EVERY 6 HOURS PRN
Qty: 4 TABLET | Refills: 0 | Status: SHIPPED | OUTPATIENT
Start: 2020-05-15 | End: 2020-05-17

## 2020-05-15 RX ORDER — DICYCLOMINE HYDROCHLORIDE 10 MG/1
10 CAPSULE ORAL ONCE
Status: COMPLETED | OUTPATIENT
Start: 2020-05-15 | End: 2020-05-15

## 2020-05-15 RX ADMIN — OXYCODONE HYDROCHLORIDE AND ACETAMINOPHEN 1 TABLET: 5; 325 TABLET ORAL at 20:08

## 2020-05-15 RX ADMIN — DICYCLOMINE HYDROCHLORIDE 10 MG: 10 CAPSULE ORAL at 19:16

## 2020-05-15 RX ADMIN — HYDROMORPHONE HYDROCHLORIDE 0.5 MG: 1 INJECTION, SOLUTION INTRAMUSCULAR; INTRAVENOUS; SUBCUTANEOUS at 18:28

## 2020-05-15 RX ADMIN — HYDROMORPHONE HYDROCHLORIDE 1 MG: 1 INJECTION, SOLUTION INTRAMUSCULAR; INTRAVENOUS; SUBCUTANEOUS at 16:27

## 2020-05-15 RX ADMIN — SODIUM CHLORIDE 1000 ML: 9 INJECTION, SOLUTION INTRAVENOUS at 16:27

## 2020-05-15 RX ADMIN — IOPAMIDOL 75 ML: 755 INJECTION, SOLUTION INTRAVENOUS at 18:19

## 2020-05-15 ASSESSMENT — PAIN SCALES - GENERAL
PAINLEVEL_OUTOF10: 8
PAINLEVEL_OUTOF10: 9
PAINLEVEL_OUTOF10: 9

## 2020-05-15 ASSESSMENT — ENCOUNTER SYMPTOMS
SHORTNESS OF BREATH: 0
ABDOMINAL PAIN: 1
NAUSEA: 1
VOMITING: 0
CHEST TIGHTNESS: 0

## 2020-05-15 ASSESSMENT — PAIN DESCRIPTION - PAIN TYPE: TYPE: ACUTE PAIN

## 2020-05-15 ASSESSMENT — PAIN DESCRIPTION - FREQUENCY: FREQUENCY: CONTINUOUS

## 2020-05-15 ASSESSMENT — PAIN DESCRIPTION - LOCATION: LOCATION: ABDOMEN

## 2020-05-15 ASSESSMENT — PAIN DESCRIPTION - DESCRIPTORS: DESCRIPTORS: SHARP

## 2020-05-15 NOTE — ED NOTES
Pt to CT via stretcher at this time.      Cherylene Andes HENRY [facility]  05/15/20 [id number]

## 2020-05-15 NOTE — ED NOTES
Patient resting comfortably in bed at this time. Patient states that his pain is \"still about an 8\". Pt. States that the pain medication \"barely helped\". Pt medicated, see MAR. Call light within reach. No wants or needs at this time. Will continue to monitor.       Geronimo River, NORBERTO  05/15/20 1922

## 2020-05-15 NOTE — ED PROVIDER NOTES
/77      Pulse 61      Resp 18      Temp 98.2 °F (36.8 °C)      Temp Source Oral      SpO2 98 %      Weight 238 lb 1.6 oz (108 kg)      Height 6' (1.829 m)      Head Circumference       Peak Flow       Pain Score       Pain Loc       Pain Edu? Excl. in [address]? Physical Exam  Vitals signs reviewed. Constitutional:       Appearance: Normal appearance. HENT:      Head: Normocephalic and atraumatic. Neck:      Musculoskeletal: Normal range of motion and neck supple. Cardiovascular:      Rate and Rhythm: Normal rate and regular rhythm. Pulmonary:      Effort: Pulmonary effort is normal. No respiratory distress. Abdominal:      Palpations: Abdomen is soft. Tenderness: There is abdominal tenderness (right sided and suprapubic ttp). Musculoskeletal: Normal range of motion. Skin:     General: Skin is warm. Neurological:      General: No focal deficit present. Mental Status: He is alert and oriented to person, place, and time. Psychiatric:         Mood and Affect: Mood normal.         Behavior: Behavior normal.           DIAGNOSTIC RESULTS     EKG: All EKG's are interpreted by the Emergency Department Physician who either signs or Co-signs this chart in the absence of a cardiologist.    RADIOLOGY:   Non-plain film images such as CT, Ultrasound and MRI are read by the radiologist. Plain radiographic images are visualized and preliminarilyinterpreted by the emergency physician with the below findings:    Interpretation per the Radiologist below,if available at the time of this note:    CT ABDOMEN PELVIS W IV CONTRAST Additional Contrast? None   Final Result   1. Cholelithiasis. No CT evidence of acute cholecystitis. 2. No evidence of acute appendicitis. 3. Status post left nephrectomy and splenectomy. Small bowel containing   hernia within the left posterior aspect of the abdominal wall within left   upper quadrant. No significant inflammation.   No mechanical bowel [address]  [phone]  Go to   If symptoms worsen    Felicia Arreola MD  [address] [place] [address] Drive  [address] [facility] [address]  [phone]    Schedule an appointment as soon as possible for a visit in 3 days  For follow up and reevaluation. Cris Fair MD  [address] [place] [address] Drive. [address] [facility] [address] [facility] [address]  [phone]    Call in 1 day  For follow up and reevaluation. DISCHARGE MEDICATIONS:  Discharge Medication List as of 5/15/2020  8:05 PM      START taking these medications    Details   dicyclomine (BENTYL) 10 MG capsule Take 1 capsule by mouth every 6 hours as needed (cramps), Disp-20 capsule, R-0Print      HYDROcodone-acetaminophen (NORCO) 5-325 MG per tablet Take 1 tablet by mouth every 6 hours as needed for Pain for up to 2 days. , Disp-4 tablet, R-0Print             (Please note that portions of this note were completed with a voice recognition program.  Efforts were made to edit the dictations but occasionally words are mis-transcribed.)    PATTI [place], [place]  05/15/20 [address]  05/15/20 [address] [place] [address]  05/15/20 [id number]

## 2020-05-15 NOTE — ED NOTES
Pt presents to ED via private vehicle for c/o sharp abd pain x 3 hours. +nausea. Denies emesis, diarrhea or constipation. Hx of gastric bypass, SBO, and hernia.       Racheal Rivera RN  05/15/20 [id number]

## 2020-05-16 ENCOUNTER — CARE COORDINATION (OUTPATIENT)
Dept: CARE COORDINATION | Age: 50
End: 2020-05-16

## 2020-05-16 ENCOUNTER — HOSPITAL ENCOUNTER (EMERGENCY)
Age: 50
Discharge: HOME | End: 2020-05-16
Attending: EMERGENCY MEDICINE
Payer: COMMERCIAL

## 2020-05-16 VITALS
RESPIRATION RATE: 16 BRPM | BODY MASS INDEX: 32.13 KG/M2 | OXYGEN SATURATION: 95 % | HEART RATE: 59 BPM | DIASTOLIC BLOOD PRESSURE: 66 MMHG | SYSTOLIC BLOOD PRESSURE: 105 MMHG | TEMPERATURE: 99.2 F | HEIGHT: 72 IN | WEIGHT: 237.22 LBS

## 2020-05-16 LAB
A/G RATIO: 1.5 (ref 1.1–2.2)
ALBUMIN SERPL-MCNC: 3.8 G/DL (ref 3.4–5)
ALP BLD-CCNC: 103 U/L (ref 40–129)
ALT SERPL-CCNC: <5 U/L (ref 10–40)
AMYLASE: 21 U/L (ref 25–115)
ANION GAP SERPL CALCULATED.3IONS-SCNC: 10 MMOL/L (ref 3–16)
AST SERPL-CCNC: 14 U/L (ref 15–37)
BASOPHILS ABSOLUTE: 0 K/UL (ref 0–0.2)
BASOPHILS RELATIVE PERCENT: 0 %
BILIRUB SERPL-MCNC: 0.3 MG/DL (ref 0–1)
BILIRUBIN URINE: NEGATIVE
BLOOD, URINE: NEGATIVE
BUN BLDV-MCNC: 6 MG/DL (ref 7–20)
BURR CELLS: ABNORMAL
CALCIUM SERPL-MCNC: 8.8 MG/DL (ref 8.3–10.6)
CHLORIDE BLD-SCNC: 105 MMOL/L (ref 99–110)
CLARITY: CLEAR
CO2: 25 MMOL/L (ref 21–32)
COLOR: YELLOW
CREAT SERPL-MCNC: 1 MG/DL (ref 0.9–1.3)
EOSINOPHILS ABSOLUTE: 0.2 K/UL (ref 0–0.6)
EOSINOPHILS RELATIVE PERCENT: 3 %
GFR AFRICAN AMERICAN: >60
GFR NON-AFRICAN AMERICAN: >60
GLOBULIN: 2.5 G/DL
GLUCOSE BLD-MCNC: 86 MG/DL (ref 70–99)
GLUCOSE URINE: NEGATIVE MG/DL
HCT VFR BLD CALC: 38.4 % (ref 40.5–52.5)
HEMOGLOBIN: 12.6 G/DL (ref 13.5–17.5)
KETONES, URINE: NEGATIVE MG/DL
LACTIC ACID: 0.8 MMOL/L (ref 0.4–2)
LEUKOCYTE ESTERASE, URINE: NEGATIVE
LIPASE: 27 U/L (ref 13–60)
LYMPHOCYTES ABSOLUTE: 4.9 K/UL (ref 1–5.1)
LYMPHOCYTES RELATIVE PERCENT: 65 %
MCH RBC QN AUTO: 28.5 PG (ref 26–34)
MCHC RBC AUTO-ENTMCNC: 32.8 G/DL (ref 31–36)
MCV RBC AUTO: 86.7 FL (ref 80–100)
MICROSCOPIC EXAMINATION: NORMAL
MONOCYTES ABSOLUTE: 0.2 K/UL (ref 0–1.3)
MONOCYTES RELATIVE PERCENT: 2 %
NEUTROPHILS ABSOLUTE: 2.3 K/UL (ref 1.7–7.7)
NEUTROPHILS RELATIVE PERCENT: 30 %
NITRITE, URINE: NEGATIVE
PDW BLD-RTO: 18.3 % (ref 12.4–15.4)
PH UA: 5.5 (ref 5–8)
PLATELET # BLD: 331 K/UL (ref 135–450)
PLATELET SLIDE REVIEW: ADEQUATE
PMV BLD AUTO: 8.7 FL (ref 5–10.5)
POIKILOCYTES: ABNORMAL
POTASSIUM REFLEX MAGNESIUM: 4.3 MMOL/L (ref 3.5–5.1)
PROTEIN UA: NEGATIVE MG/DL
RBC # BLD: 4.43 M/UL (ref 4.2–5.9)
SEDIMENTATION RATE, ERYTHROCYTE: 5 MM/HR (ref 0–15)
SODIUM BLD-SCNC: 140 MMOL/L (ref 136–145)
SPECIFIC GRAVITY UA: 1.02 (ref 1–1.03)
TOTAL PROTEIN: 6.3 G/DL (ref 6.4–8.2)
URINE REFLEX TO CULTURE: NORMAL
URINE TYPE: NORMAL
UROBILINOGEN, URINE: 0.2 E.U./DL
WBC # BLD: 7.5 K/UL (ref 4–11)

## 2020-05-16 PROCEDURE — 83690 ASSAY OF LIPASE: CPT

## 2020-05-16 PROCEDURE — 85652 RBC SED RATE AUTOMATED: CPT

## 2020-05-16 PROCEDURE — 85025 COMPLETE CBC W/AUTO DIFF WBC: CPT

## 2020-05-16 PROCEDURE — 96374 THER/PROPH/DIAG INJ IV PUSH: CPT

## 2020-05-16 PROCEDURE — 82150 ASSAY OF AMYLASE: CPT

## 2020-05-16 PROCEDURE — 99283 EMERGENCY DEPT VISIT LOW MDM: CPT

## 2020-05-16 PROCEDURE — 81003 URINALYSIS AUTO W/O SCOPE: CPT

## 2020-05-16 PROCEDURE — 80053 COMPREHEN METABOLIC PANEL: CPT

## 2020-05-16 PROCEDURE — 36415 COLL VENOUS BLD VENIPUNCTURE: CPT

## 2020-05-16 PROCEDURE — 83605 ASSAY OF LACTIC ACID: CPT

## 2020-05-16 PROCEDURE — 6360000002 HC RX W HCPCS: Performed by: EMERGENCY MEDICINE

## 2020-05-16 PROCEDURE — 2580000003 HC RX 258: Performed by: EMERGENCY MEDICINE

## 2020-05-16 PROCEDURE — 96375 TX/PRO/DX INJ NEW DRUG ADDON: CPT

## 2020-05-16 RX ORDER — OXYCODONE HYDROCHLORIDE AND ACETAMINOPHEN 5; 325 MG/1; MG/1
1 TABLET ORAL EVERY 6 HOURS PRN
Qty: 12 TABLET | Refills: 0 | Status: SHIPPED | OUTPATIENT
Start: 2020-05-16 | End: 2020-05-19

## 2020-05-16 RX ORDER — ONDANSETRON 2 MG/ML
4 INJECTION INTRAMUSCULAR; INTRAVENOUS ONCE
Status: COMPLETED | OUTPATIENT
Start: 2020-05-16 | End: 2020-05-16

## 2020-05-16 RX ORDER — 0.9 % SODIUM CHLORIDE 0.9 %
1000 INTRAVENOUS SOLUTION INTRAVENOUS ONCE
Status: COMPLETED | OUTPATIENT
Start: 2020-05-16 | End: 2020-05-16

## 2020-05-16 RX ADMIN — HYDROMORPHONE HYDROCHLORIDE 2 MG: 1 INJECTION, SOLUTION INTRAMUSCULAR; INTRAVENOUS; SUBCUTANEOUS at 16:52

## 2020-05-16 RX ADMIN — SODIUM CHLORIDE 1000 ML: 9 INJECTION, SOLUTION INTRAVENOUS at 16:52

## 2020-05-16 RX ADMIN — ONDANSETRON 4 MG: 2 INJECTION INTRAMUSCULAR; INTRAVENOUS at 16:51

## 2020-05-16 ASSESSMENT — PAIN DESCRIPTION - FREQUENCY: FREQUENCY: CONTINUOUS

## 2020-05-16 ASSESSMENT — PAIN SCALES - GENERAL
PAINLEVEL_OUTOF10: 9
PAINLEVEL_OUTOF10: 8
PAINLEVEL_OUTOF10: 9
PAINLEVEL_OUTOF10: 8

## 2020-05-16 ASSESSMENT — PAIN - FUNCTIONAL ASSESSMENT: PAIN_FUNCTIONAL_ASSESSMENT: 0-10

## 2020-05-16 ASSESSMENT — PAIN DESCRIPTION - ONSET: ONSET: GRADUAL

## 2020-05-16 ASSESSMENT — PAIN DESCRIPTION - LOCATION: LOCATION: ABDOMEN

## 2020-05-16 ASSESSMENT — PAIN DESCRIPTION - DESCRIPTORS: DESCRIPTORS: SHARP

## 2020-05-16 ASSESSMENT — PAIN DESCRIPTION - PAIN TYPE: TYPE: ACUTE PAIN

## 2020-05-16 ASSESSMENT — PAIN DESCRIPTION - ORIENTATION: ORIENTATION: RIGHT;LOWER

## 2020-05-16 ASSESSMENT — PAIN DESCRIPTION - PROGRESSION: CLINICAL_PROGRESSION: GRADUALLY WORSENING

## 2020-05-16 NOTE — DISCHARGE INSTR - COC
Continuity of Care Form    Patient Name: Rasta Martinez   :  1970  MRN:  [id number]    Admit date:  5/15/2020  Discharge date:  ***    Code Status Order: Prior   Advance Directives:     Admitting Physician:  No admitting provider for patient encounter. PCP: Francisco Javier Cardoza MD    Discharging Nurse: [facility] Unit/Room#: 042/S-42  Discharging Unit Phone Number: ***    Emergency Contact:   Extended Emergency Contact Information  Primary Emergency Contact: Kari Payton  Address: [address] Phone: [phone]  Mobile Phone: [phone]  Relation: Spouse  Secondary Emergency Contact: [address] Phone: [phone]  Relation: Parent    Past Surgical History:  Past Surgical History:   Procedure Laterality Date    ABDOMEN SURGERY      gastric bypass    ABDOMEN SURGERY  2016    repair of recurrent incisional hernia with mesh, removal of old mesh, bilateral component separation    ABDOMINAL EXPLORATION SURGERY  16    exp lap, lysis of adhesions, small bowel resection    CARPAL TUNNEL RELEASE      bilat    DILATATION, ESOPHAGUS      ENDOSCOPY, COLON, DIAGNOSTIC      EYE SURGERY      GASTRIC BYPASS SURGERY  2009    Has lost about 200 pounds.     HERNIA REPAIR  3-    ventral    JOINT REPLACEMENT      KIDNEY REMOVAL Left 2018    KNEE ARTHROSCOPY Right 2013    Dr.Robert Sanders     KNEE ARTHROSCOPY Right 12    RIGHT KNEE ARTHROSCOPY WITH CHONDROPLASTY    KNEE SURGERY  2012    right, arthroscopy    KNEE SURGERY Right 2020    INCISION AND DRAINAGE RIGHT KNEE AND WOULD VAC PLACEMENT performed by Serafin Ktichen MD at [address]      OTHER SURGICAL HISTORY Left 2016    CT biopsy ablasion left kidney    OTHER SURGICAL HISTORY  2016    small intestine 12 inch removed, 2 hernia surgeries, another surgery to drain infection from Therapy:  Wound 20 Knee Right right knee (Active)   Number of days: 39        Elimination:  Continence:   · Bowel: {YES / HU:75917}  · Bladder: {YES / ZD:43292}  Urinary Catheter: {Urinary Catheter:103697476}   Colostomy/Ileostomy/Ileal Conduit: {YES / TL:22873}       Date of Last BM: ***    Intake/Output Summary (Last 24 hours) at 5/15/2020 2054  Last data filed at 5/15/2020 170  Gross per 24 hour   Intake --   Output 600 ml   Net -600 ml     No intake/output data recorded.     Safety Concerns:     [employer] Safety Concerns:020796465}    Impairments/Disabilities:      [employer] Impairments/Disabilities:140356321}    Nutrition Therapy:  Current Nutrition Therapy:   [employer] Diet List:316826572}    Routes of Feeding: {CHP DME Other Feedings:444055921}  Liquids: {Slp liquid thickness:31043}  Daily Fluid Restriction: {CHP DME Yes amt example:284324359}  Last Modified Barium Swallow with Video (Video Swallowing Test): {Done Not Done BYEJ:434279385}    Treatments at the Time of Hospital Discharge:   Respiratory Treatments: ***  Oxygen Therapy:  {Therapy; copd oxygen:89359}  Ventilator:    { CC Vent MELB:017317584}    Rehab Therapies: {THERAPEUTIC INTERVENTION:1739539349}  Weight Bearing Status/Restrictions: [address] [employer] Weight Bearin}  Other Medical Equipment (for information only, NOT a DME order):  {EQUIPMENT:386322368}  Other Treatments: ***    Patient's personal belongings (please select all that are sent with patient):  {CHP DME Belongings:732837879}    RN SIGNATURE:  {Esignature:604736711}    CASE MANAGEMENT/SOCIAL WORK SECTION    Inpatient Status Date: ***    Readmission Risk Assessment Score:  Readmission Risk              Risk of Unplanned Readmission:        0           Discharging to Facility/ Agency   · Name:   · Address:  · Phone:  · Fax:    Dialysis Facility (if applicable)   · Name:  · Address:  · Dialysis Schedule:  · Phone:  · Fax:    / signature:

## 2020-05-16 NOTE — ED NOTES
Walked pt from [facility] to ED bed. Obtained VS. Pt wearing mask, medic wearing mask, gloves, safety glasses, and face shield.      Wellington Mcbride, EMT-P  05/16/20 6186

## 2020-05-17 ENCOUNTER — HOSPITAL ENCOUNTER (EMERGENCY)
Age: 50
Discharge: HOME | End: 2020-05-17
Attending: EMERGENCY MEDICINE
Payer: COMMERCIAL

## 2020-05-17 VITALS
HEART RATE: 52 BPM | OXYGEN SATURATION: 98 % | TEMPERATURE: 98.5 F | DIASTOLIC BLOOD PRESSURE: 72 MMHG | WEIGHT: 235.23 LBS | RESPIRATION RATE: 16 BRPM | SYSTOLIC BLOOD PRESSURE: 121 MMHG | BODY MASS INDEX: 31.9 KG/M2

## 2020-05-17 LAB
A/G RATIO: 1.4 (ref 1.1–2.2)
ALBUMIN SERPL-MCNC: 3.4 G/DL (ref 3.4–5)
ALP BLD-CCNC: 98 U/L (ref 40–129)
ALT SERPL-CCNC: 9 U/L (ref 10–40)
ANION GAP SERPL CALCULATED.3IONS-SCNC: 8 MMOL/L (ref 3–16)
AST SERPL-CCNC: 13 U/L (ref 15–37)
BASOPHILS ABSOLUTE: 0.1 K/UL (ref 0–0.2)
BASOPHILS RELATIVE PERCENT: 1.9 %
BILIRUB SERPL-MCNC: <0.2 MG/DL (ref 0–1)
BILIRUBIN URINE: NEGATIVE
BLOOD, URINE: NEGATIVE
BUN BLDV-MCNC: 4 MG/DL (ref 7–20)
CALCIUM SERPL-MCNC: 8.5 MG/DL (ref 8.3–10.6)
CHLORIDE BLD-SCNC: 107 MMOL/L (ref 99–110)
CLARITY: CLEAR
CO2: 26 MMOL/L (ref 21–32)
COLOR: YELLOW
CREAT SERPL-MCNC: 1 MG/DL (ref 0.9–1.3)
EOSINOPHILS ABSOLUTE: 0.3 K/UL (ref 0–0.6)
EOSINOPHILS RELATIVE PERCENT: 5.2 %
GFR AFRICAN AMERICAN: >60
GFR NON-AFRICAN AMERICAN: >60
GLOBULIN: 2.5 G/DL
GLUCOSE BLD-MCNC: 87 MG/DL (ref 70–99)
GLUCOSE URINE: NEGATIVE MG/DL
HCT VFR BLD CALC: 38.9 % (ref 40.5–52.5)
HEMOGLOBIN: 12.6 G/DL (ref 13.5–17.5)
KETONES, URINE: NEGATIVE MG/DL
LEUKOCYTE ESTERASE, URINE: NEGATIVE
LIPASE: 26 U/L (ref 13–60)
LYMPHOCYTES ABSOLUTE: 2.6 K/UL (ref 1–5.1)
LYMPHOCYTES RELATIVE PERCENT: 43.9 %
MCH RBC QN AUTO: 28.1 PG (ref 26–34)
MCHC RBC AUTO-ENTMCNC: 32.3 G/DL (ref 31–36)
MCV RBC AUTO: 86.8 FL (ref 80–100)
MICROSCOPIC EXAMINATION: NORMAL
MONOCYTES ABSOLUTE: 0.6 K/UL (ref 0–1.3)
MONOCYTES RELATIVE PERCENT: 9.8 %
NEUTROPHILS ABSOLUTE: 2.3 K/UL (ref 1.7–7.7)
NEUTROPHILS RELATIVE PERCENT: 39.2 %
NITRITE, URINE: NEGATIVE
PDW BLD-RTO: 18 % (ref 12.4–15.4)
PH UA: 5.5 (ref 5–8)
PLATELET # BLD: 312 K/UL (ref 135–450)
PMV BLD AUTO: 8.3 FL (ref 5–10.5)
POTASSIUM SERPL-SCNC: 4.3 MMOL/L (ref 3.5–5.1)
PROTEIN UA: NEGATIVE MG/DL
RBC # BLD: 4.48 M/UL (ref 4.2–5.9)
SODIUM BLD-SCNC: 141 MMOL/L (ref 136–145)
SPECIFIC GRAVITY UA: 1.01 (ref 1–1.03)
TOTAL PROTEIN: 5.9 G/DL (ref 6.4–8.2)
URINE REFLEX TO CULTURE: NORMAL
URINE TYPE: NORMAL
UROBILINOGEN, URINE: 0.2 E.U./DL
WBC # BLD: 5.9 K/UL (ref 4–11)

## 2020-05-17 PROCEDURE — 36415 COLL VENOUS BLD VENIPUNCTURE: CPT

## 2020-05-17 PROCEDURE — 2500000003 HC RX 250 WO HCPCS: Performed by: EMERGENCY MEDICINE

## 2020-05-17 PROCEDURE — 80053 COMPREHEN METABOLIC PANEL: CPT

## 2020-05-17 PROCEDURE — 81003 URINALYSIS AUTO W/O SCOPE: CPT

## 2020-05-17 PROCEDURE — 83690 ASSAY OF LIPASE: CPT

## 2020-05-17 PROCEDURE — 96374 THER/PROPH/DIAG INJ IV PUSH: CPT

## 2020-05-17 PROCEDURE — 99284 EMERGENCY DEPT VISIT MOD MDM: CPT

## 2020-05-17 PROCEDURE — 85025 COMPLETE CBC W/AUTO DIFF WBC: CPT

## 2020-05-17 RX ORDER — KETAMINE HCL IN NACL, ISO-OSM 100MG/10ML
20 SYRINGE (ML) INJECTION ONCE
Status: COMPLETED | OUTPATIENT
Start: 2020-05-17 | End: 2020-05-17

## 2020-05-17 RX ADMIN — Medication 20 MG: at 16:08

## 2020-05-17 ASSESSMENT — PAIN DESCRIPTION - ORIENTATION
ORIENTATION: RIGHT;UPPER
ORIENTATION: RIGHT;UPPER

## 2020-05-17 ASSESSMENT — PAIN DESCRIPTION - LOCATION
LOCATION: ABDOMEN

## 2020-05-17 ASSESSMENT — PAIN SCALES - GENERAL
PAINLEVEL_OUTOF10: 6
PAINLEVEL_OUTOF10: 6
PAINLEVEL_OUTOF10: 9

## 2020-05-17 ASSESSMENT — PAIN DESCRIPTION - PAIN TYPE
TYPE: ACUTE PAIN

## 2020-05-17 ASSESSMENT — PAIN DESCRIPTION - DESCRIPTORS: DESCRIPTORS: SHARP

## 2020-05-17 ASSESSMENT — PAIN DESCRIPTION - FREQUENCY
FREQUENCY: CONTINUOUS
FREQUENCY: CONTINUOUS

## 2020-05-17 ASSESSMENT — PAIN - FUNCTIONAL ASSESSMENT: PAIN_FUNCTIONAL_ASSESSMENT: 0-10

## 2020-05-17 NOTE — ED PROVIDER NOTES
mid and lower abdominal pain, constant. No vomiting. No diarrhea. No constipation. : No frequency urgency or dysuria. No flank pain. Except as noted above the remainder of the review of systems was reviewed and negative. PAST MEDICAL HISTORY     Past Medical History:   Diagnosis Date    Alcoholism [facility])     last drink 2015    Allergic migraine with status migrainosus     Allergic rhinitis, seasonal     Anemia     Arthritis     right knee    Vaughn's esophagus     Benign intracranial hypertension     Cancer (HCC)     renal     Carpal tunnel syndrome     Chronic pain     Depression     Depression     GERD (gastroesophageal reflux disease)     Headache(784.0)     History of blood transfusion     History of tobacco use     Quit 7/2014    Migraine     chronic for 1 year    Morbid obesity ([address]     Movement disorder     Onychomycosis     Sleep apnea, obstructive     Severe uses cpap stop bang 6    Unspecified cerebral artery occlusion with cerebral infarction 2014    slight weakness in left arm    Wears dentures     full set    Wears glasses          SURGICAL HISTORY       Past Surgical History:   Procedure Laterality Date    ABDOMEN SURGERY      gastric bypass    ABDOMEN SURGERY  4-7-2016    repair of recurrent incisional hernia with mesh, removal of old mesh, bilateral component separation    ABDOMINAL EXPLORATION SURGERY  1/11/16    exp lap, lysis of adhesions, small bowel resection    CARPAL TUNNEL RELEASE      bilat    DILATATION, ESOPHAGUS      ENDOSCOPY, COLON, DIAGNOSTIC      EYE SURGERY      GASTRIC BYPASS SURGERY  Jan 2009    Has lost about 200 pounds.     HERNIA REPAIR  3-    ventral    JOINT REPLACEMENT      KIDNEY REMOVAL Left 08/01/2018    KNEE ARTHROSCOPY Right 7/11/2013    Dr.Robert Sanders     KNEE ARTHROSCOPY Right 7/11/12    RIGHT KNEE ARTHROSCOPY WITH CHONDROPLASTY    KNEE SURGERY  July 2012    right, arthroscopy    KNEE SURGERY Right interpreted [address], MD with the below findings:      Interpretation per the Radiologist below, if available at the time of this note:    No orders to display         ED BEDSIDE ULTRASOUND:   Performed by ED Physician - none    LABS:  Labs Reviewed   CBC WITH AUTO DIFFERENTIAL - Abnormal; Notable for the following components:       Result Value    Hemoglobin 12.6 (*)     Hematocrit 38.9 (*)     RDW 18.0 (*)     All other components within normal limits    Narrative:     Performed at:  [facility]  [address] [facility] [place] [address]   Phone [phone]   COMPREHENSIVE METABOLIC PANEL - Abnormal; Notable for the following components:    BUN 4 (*)     Total Protein 5.9 (*)     ALT 9 (*)     AST 13 (*)     All other components within normal limits    Narrative:     Performed at:  [address] [place] [address]   Phone [phone]   LIPASE    Narrative:     Performed at:  [facility]  [address] [place] [address] [place] [address]   Phone [phone]   URINE RT REFLEX TO CULTURE    Narrative:     Performed at:  [address] [place] [address]   Phone [phone]       All other labs were within normal range or not returned as of this dictation. EMERGENCY DEPARTMENT COURSE and DIFFERENTIAL DIAGNOSIS/MDM:   Vitals:    Vitals:    05/17/20 1425 05/17/20 1516 05/17/20 1547 05/17/20 1601   BP: 108/67 117/76 109/75 110/73   Pulse: 64  60 52   Resp: 16      Temp:       TempSrc:       SpO2: 99% 97% 97% 97%   Weight: 235 lb 3.7 oz (106.7 kg)          This patient had some chronic ongoing abdominal pain problems he states related to adhesions. He has had a previous history of bowel obstruction. He has had several recent visits as noted above with recent imaging. He has continued pain. He is not actively vomiting.   His

## 2020-05-18 ENCOUNTER — CARE COORDINATION (OUTPATIENT)
Dept: CARE COORDINATION | Age: 50
End: 2020-05-18

## 2020-05-19 ENCOUNTER — APPOINTMENT (OUTPATIENT)
Dept: CT IMAGING | Age: 50
End: 2020-05-19
Payer: COMMERCIAL

## 2020-05-19 ENCOUNTER — HOSPITAL ENCOUNTER (EMERGENCY)
Age: 50
Discharge: HOME | End: 2020-05-19
Attending: EMERGENCY MEDICINE
Payer: COMMERCIAL

## 2020-05-19 VITALS
DIASTOLIC BLOOD PRESSURE: 78 MMHG | OXYGEN SATURATION: 96 % | RESPIRATION RATE: 16 BRPM | BODY MASS INDEX: 32.67 KG/M2 | HEART RATE: 56 BPM | HEIGHT: 72 IN | TEMPERATURE: 99.7 F | SYSTOLIC BLOOD PRESSURE: 124 MMHG | WEIGHT: 241.18 LBS

## 2020-05-19 LAB
A/G RATIO: 1.6 (ref 1.1–2.2)
ALBUMIN SERPL-MCNC: 4.2 G/DL (ref 3.4–5)
ALP BLD-CCNC: 117 U/L (ref 40–129)
ALT SERPL-CCNC: 11 U/L (ref 10–40)
ANION GAP SERPL CALCULATED.3IONS-SCNC: 12 MMOL/L (ref 3–16)
ANISOCYTOSIS: ABNORMAL
AST SERPL-CCNC: 14 U/L (ref 15–37)
BANDED NEUTROPHILS RELATIVE PERCENT: 2 % (ref 0–7)
BASOPHILS ABSOLUTE: 0.2 K/UL (ref 0–0.2)
BASOPHILS RELATIVE PERCENT: 2 %
BILIRUB SERPL-MCNC: <0.2 MG/DL (ref 0–1)
BILIRUBIN URINE: NEGATIVE
BLOOD, URINE: NEGATIVE
BUN BLDV-MCNC: 10 MG/DL (ref 7–20)
BURR CELLS: ABNORMAL
CALCIUM SERPL-MCNC: 9.4 MG/DL (ref 8.3–10.6)
CHLORIDE BLD-SCNC: 104 MMOL/L (ref 99–110)
CLARITY: CLEAR
CO2: 25 MMOL/L (ref 21–32)
COLOR: YELLOW
CREAT SERPL-MCNC: 1 MG/DL (ref 0.9–1.3)
CRENATED RBC'S: ABNORMAL
EOSINOPHILS ABSOLUTE: 0.5 K/UL (ref 0–0.6)
EOSINOPHILS RELATIVE PERCENT: 5 %
GFR AFRICAN AMERICAN: >60
GFR NON-AFRICAN AMERICAN: >60
GLOBULIN: 2.6 G/DL
GLUCOSE BLD-MCNC: 74 MG/DL (ref 70–99)
GLUCOSE URINE: NEGATIVE MG/DL
HCT VFR BLD CALC: 40.7 % (ref 40.5–52.5)
HEMOGLOBIN: 13.4 G/DL (ref 13.5–17.5)
KETONES, URINE: NEGATIVE MG/DL
LACTIC ACID: 0.9 MMOL/L (ref 0.4–2)
LEUKOCYTE ESTERASE, URINE: NEGATIVE
LIPASE: 27 U/L (ref 13–60)
LYMPHOCYTES ABSOLUTE: 3.6 K/UL (ref 1–5.1)
LYMPHOCYTES RELATIVE PERCENT: 36 %
MCH RBC QN AUTO: 28.6 PG (ref 26–34)
MCHC RBC AUTO-ENTMCNC: 32.9 G/DL (ref 31–36)
MCV RBC AUTO: 87.1 FL (ref 80–100)
MICROSCOPIC EXAMINATION: NORMAL
MONOCYTES ABSOLUTE: 0.6 K/UL (ref 0–1.3)
MONOCYTES RELATIVE PERCENT: 6 %
NEUTROPHILS ABSOLUTE: 5 K/UL (ref 1.7–7.7)
NEUTROPHILS RELATIVE PERCENT: 49 %
NITRITE, URINE: NEGATIVE
PDW BLD-RTO: 18.6 % (ref 12.4–15.4)
PH UA: 6 (ref 5–8)
PLATELET # BLD: 355 K/UL (ref 135–450)
PMV BLD AUTO: 9.1 FL (ref 5–10.5)
POLYCHROMASIA: ABNORMAL
POTASSIUM SERPL-SCNC: 3.9 MMOL/L (ref 3.5–5.1)
PROTEIN UA: NEGATIVE MG/DL
RBC # BLD: 4.67 M/UL (ref 4.2–5.9)
SODIUM BLD-SCNC: 141 MMOL/L (ref 136–145)
SPECIFIC GRAVITY UA: 1.02 (ref 1–1.03)
TARGET CELLS: ABNORMAL
TOTAL PROTEIN: 6.8 G/DL (ref 6.4–8.2)
URINE REFLEX TO CULTURE: NORMAL
URINE TYPE: NORMAL
UROBILINOGEN, URINE: 1 E.U./DL
WBC # BLD: 9.9 K/UL (ref 4–11)

## 2020-05-19 PROCEDURE — 96375 TX/PRO/DX INJ NEW DRUG ADDON: CPT

## 2020-05-19 PROCEDURE — 6360000002 HC RX W HCPCS: Performed by: EMERGENCY MEDICINE

## 2020-05-19 PROCEDURE — 96374 THER/PROPH/DIAG INJ IV PUSH: CPT

## 2020-05-19 PROCEDURE — 36415 COLL VENOUS BLD VENIPUNCTURE: CPT

## 2020-05-19 PROCEDURE — 80053 COMPREHEN METABOLIC PANEL: CPT

## 2020-05-19 PROCEDURE — 83605 ASSAY OF LACTIC ACID: CPT

## 2020-05-19 PROCEDURE — 85025 COMPLETE CBC W/AUTO DIFF WBC: CPT

## 2020-05-19 PROCEDURE — 74177 CT ABD & PELVIS W/CONTRAST: CPT

## 2020-05-19 PROCEDURE — 6360000004 HC RX CONTRAST MEDICATION: Performed by: EMERGENCY MEDICINE

## 2020-05-19 PROCEDURE — 83690 ASSAY OF LIPASE: CPT

## 2020-05-19 PROCEDURE — 96376 TX/PRO/DX INJ SAME DRUG ADON: CPT

## 2020-05-19 PROCEDURE — 2580000003 HC RX 258: Performed by: EMERGENCY MEDICINE

## 2020-05-19 PROCEDURE — 81003 URINALYSIS AUTO W/O SCOPE: CPT

## 2020-05-19 PROCEDURE — 99284 EMERGENCY DEPT VISIT MOD MDM: CPT

## 2020-05-19 PROCEDURE — 6370000000 HC RX 637 (ALT 250 FOR IP): Performed by: EMERGENCY MEDICINE

## 2020-05-19 RX ORDER — ONDANSETRON 2 MG/ML
4 INJECTION INTRAMUSCULAR; INTRAVENOUS ONCE
Status: COMPLETED | OUTPATIENT
Start: 2020-05-19 | End: 2020-05-19

## 2020-05-19 RX ORDER — 0.9 % SODIUM CHLORIDE 0.9 %
1000 INTRAVENOUS SOLUTION INTRAVENOUS ONCE
Status: COMPLETED | OUTPATIENT
Start: 2020-05-19 | End: 2020-05-19

## 2020-05-19 RX ORDER — DICYCLOMINE HYDROCHLORIDE 10 MG/1
10 CAPSULE ORAL ONCE
Status: COMPLETED | OUTPATIENT
Start: 2020-05-19 | End: 2020-05-19

## 2020-05-19 RX ORDER — OXYCODONE HYDROCHLORIDE AND ACETAMINOPHEN 5; 325 MG/1; MG/1
1 TABLET ORAL EVERY 8 HOURS PRN
Qty: 9 TABLET | Refills: 0 | Status: SHIPPED | OUTPATIENT
Start: 2020-05-19 | End: 2020-05-22 | Stop reason: ALTCHOICE

## 2020-05-19 RX ADMIN — DICYCLOMINE HYDROCHLORIDE 10 MG: 10 CAPSULE ORAL at 21:38

## 2020-05-19 RX ADMIN — IOVERSOL 100 ML: 678 INJECTION INTRA-ARTERIAL; INTRAVENOUS at 20:47

## 2020-05-19 RX ADMIN — HYDROMORPHONE HYDROCHLORIDE 1 MG: 1 INJECTION, SOLUTION INTRAMUSCULAR; INTRAVENOUS; SUBCUTANEOUS at 22:35

## 2020-05-19 RX ADMIN — HYDROMORPHONE HYDROCHLORIDE 0.5 MG: 1 INJECTION, SOLUTION INTRAMUSCULAR; INTRAVENOUS; SUBCUTANEOUS at 19:27

## 2020-05-19 RX ADMIN — SODIUM CHLORIDE 1000 ML: 9 INJECTION, SOLUTION INTRAVENOUS at 19:25

## 2020-05-19 RX ADMIN — IOHEXOL 50 ML: 240 INJECTION, SOLUTION INTRATHECAL; INTRAVASCULAR; INTRAVENOUS; ORAL at 20:47

## 2020-05-19 RX ADMIN — ONDANSETRON 4 MG: 2 INJECTION INTRAMUSCULAR; INTRAVENOUS at 19:25

## 2020-05-19 ASSESSMENT — PAIN SCALES - GENERAL
PAINLEVEL_OUTOF10: 9
PAINLEVEL_OUTOF10: 9
PAINLEVEL_OUTOF10: 8
PAINLEVEL_OUTOF10: 9
PAINLEVEL_OUTOF10: 9

## 2020-05-19 ASSESSMENT — PAIN DESCRIPTION - DESCRIPTORS: DESCRIPTORS: SHARP

## 2020-05-19 ASSESSMENT — PAIN DESCRIPTION - LOCATION: LOCATION: ABDOMEN

## 2020-05-19 ASSESSMENT — PAIN DESCRIPTION - ORIENTATION: ORIENTATION: LOWER

## 2020-05-19 ASSESSMENT — PAIN DESCRIPTION - PAIN TYPE: TYPE: ACUTE PAIN

## 2020-05-19 NOTE — ED PROVIDER NOTES
Triage Chief Complaint:   Abdominal Pain (mid abd pain 9/10, pt in ED 2 and 3 days ago for same. pt states he is to see pain management on 5/21/20) and Nausea (hx. obstructions. Nauseated all day. Last BM 4 days ago, using Metamucil.)    Wiyot:  Maria Isabel Rodriguez is a 53 y.o. male that presents for evaluation of atraumatic abdominal pain and decreased bowel movement. States last bowel movement was 4 days ago despite use of Metamucil. Past medical history remarkable for chronic abdominal pain and a history of small bowel obstruction as well as prior gastric bypass, nephrectomy, splenectomy. Notes nausea and abdominal pain. Chart review shows that he has had abdominal pain related complaints dating back multiple years. Labs in no acute distress    ROS:  At least 12 systems reviewed and otherwise acutely negative except as in the [address]     Past Medical History:   Diagnosis Date    Alcoholism [facility])     last drink 2015    Allergic migraine with status migrainosus     Allergic rhinitis, seasonal     Anemia     Arthritis     right knee    Vaughn's esophagus     Benign intracranial hypertension     Cancer (HCC)     renal     Carpal tunnel syndrome     Chronic pain     Depression     Depression     GERD (gastroesophageal reflux disease)     Headache(784.0)     History of blood transfusion     History of tobacco use     Quit 7/2014    Migraine     chronic for 1 year    Morbid obesity ([address]     Movement disorder     Onychomycosis     Sleep apnea, obstructive     Severe uses cpap stop bang 6    Unspecified cerebral artery occlusion with cerebral infarction 2014    slight weakness in left arm    Wears dentures     full set    Wears glasses      Past Surgical History:   Procedure Laterality Date    ABDOMEN SURGERY      gastric bypass    ABDOMEN SURGERY  4-7-2016    repair of recurrent incisional hernia with mesh, removal of old mesh, bilateral component separation    ABDOMINAL EXPLORATION SURGERY 1/11/16    exp lap, lysis of adhesions, small bowel resection    CARPAL TUNNEL RELEASE      bilat    DILATATION, ESOPHAGUS      ENDOSCOPY, COLON, DIAGNOSTIC      EYE SURGERY      GASTRIC BYPASS SURGERY  Jan 2009    Has lost about 200 pounds.  HERNIA REPAIR  3-    ventral    JOINT REPLACEMENT      KIDNEY REMOVAL Left 08/01/2018    KNEE ARTHROSCOPY Right 7/11/2013    Dr.Robert Sanders     KNEE ARTHROSCOPY Right 7/11/12    RIGHT KNEE ARTHROSCOPY WITH CHONDROPLASTY    KNEE SURGERY  July 2012    right, arthroscopy    KNEE SURGERY Right 2/18/2020    INCISION AND DRAINAGE RIGHT KNEE AND WOULD VAC PLACEMENT performed by Sharan Dueñas MD at [address]  2005    OTHER SURGICAL HISTORY Left 03/08/2016    CT biopsy ablasion left kidney    OTHER SURGICAL HISTORY  2016    small intestine 12 inch removed, 2 hernia surgeries, another surgery to drain infection from incision.      REVISION TOTAL KNEE ARTHROPLASTY Right 12/17/2019    RIGHT REVISION TIBIA TOTAL KNEE REPLACEMENT performed by Sharan Dueñas MD at [facility] Right 1/22/2020    RIGHT KNEE IRRIGATION AND DEBRIDEMENT WITH POLY EXCHANGE [facility]  [facility]    RIGHT    UPPER GASTROINTESTINAL ENDOSCOPY  6-7-2016    UPPER GASTROINTESTINAL ENDOSCOPY  04/02/2018     Family History   Problem Relation Age of Onset    Cancer Father         Lymphoma    Arthritis Mother     Dementia Other     Diabetes Other     Cancer Other     Diabetes Maternal Uncle     Heart Disease Maternal Uncle     Depression Other     Heart Disease Maternal Uncle      Social History     Socioeconomic History    Marital status:      Spouse name: Hue Avalos Number of children: 2    Years of education: Not on file    Highest education level: Not on file   Occupational History    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 90 tablet 1    pantoprazole (PROTONIX) 40 MG tablet Take 1 tablet by mouth 2 times daily      aspirin EC 81 MG EC tablet Take 1 tablet by mouth 2 times daily for 14 days Please avoid missing doses. 28 tablet 0    atorvastatin (LIPITOR) 40 MG tablet TAKE 1 TABLET BY MOUTH EVERY NIGHT AT BEDTIME 90 tablet 1    sildenafil (REVATIO) 20 MG tablet Take 4 tablets by mouth as needed (30 min prior to intercourse) 30 tablet 5    calcium-vitamin D (OSCAL 500/200 D-3) 500-200 MG-UNIT per tablet Take 1 tablet by mouth 2 times daily       Multiple Vitamin TABS Take 1 tablet by mouth daily        Allergies   Allergen Reactions    Nsaids Nausea Only and Other (See Comments)     Hx of Barretts esophagus      Prochlorperazine Other (See Comments)     No allergic reaction, patient reported sense of \"restlessness\" and fidgiting    Valtrex [Valacyclovir Hcl] Diarrhea    Morphine Rash    Morphine And Related Itching    Tolmetin Nausea Only     Hx of Barretts esophagus       [unfilled]    Nursing Notes Reviewed    Physical Exam:  Vitals:    05/19/20 1851   BP: 119/73   Pulse: 80   Resp: 20   Temp: 99.7 °F (37.6 °C)   SpO2: 97%       General: Alert moderately obese white male in no acute distress. Head: Atraumatic and normocephalic. Eyes: No conjunctival injection. Pupils equal round reactive. Extraocular movements are intact. No pallor. ENT: Susie Rank is clear. Oropharynx is moist without erythema. Neck: Supple without adenopathy, nontender. Heart: Regular rate and rhythm. No murmurs or gallops noted. Lungs: Breath sounds equal bilaterally and clear.   Abdomen: Obese, soft, some mild diffuse right mid and lower abdominal tenderness poorly localized without guarding or rebound. Midline lower abdominal scars well-healed. No hernias. : No inguinal hernia appreciated. Skin: Warm and dry, good turgor. Musculoskeletal: No lower extremity edema. Intact symmetrical distal pulses. Neuro: Awake, alert, oriented. No focal motor deficits. I have reviewed and interpreted all of the currently available lab results from this visit (if applicable):  No results found for this visit on 05/19/20. Radiographs (if obtained):  [] The following radiograph was interpreted by myself in the absence of a radiologist:  [x] Radiologist's Report Reviewed:     CT ABDOMEN PELVIS W IV CONTRAST Additional Contrast? Oral (Preliminary result)   Result time 05/19/20 21:23:24   Preliminary result by Kia Pineda MD (05/19/20 52:73:26)                Impression:    1. Stable chronic left lower lobe patchy ground-glass opacities compared to  the prior 05/15/2020 and 04/11/2020 exams. 2. There is nonspecific mild fluid opacification of the gastric remnant and  duodenal bulb.  Within the bulb lumen there is a 1.3 cm ovoid soft tissue  density mass which is nonspecific. Marinus Asaf is some nonspecific mild  intraluminal hyperdense material in the 3rd portion of the duodenum and the  proximal jejunum just distal to the ligament of Treitz.  This could represent  contrast though intraluminal hyperdense blood in the setting of hemorrhage  cannot be excluded. 3. Stable postsurgical bowel adhesions with no acute obstruction or  perforation. 4. Redemonstration of cholelithiasis with no evidence of acute cholecystitis  or biliary obstruction. Narrative:    EXAMINATION:  CT OF THE ABDOMEN AND PELVIS WITH CONTRAST 5/19/2020 8:40 pm    TECHNIQUE:  CT of the abdomen and pelvis was performed with the administration of  intravenous contrast. Multiplanar reformatted images are provided for review.   Dose modulation, iterative reconstruction, and/or weight material which is  isodense to adjacent opacified vasculature.  In the proximal jejunum there is  some mild intraluminal hyperdense material.  Small bowel adhesions and  postsurgical focal dilated loop with no evidence of acute obstruction.  The  colon demonstrates mild fecal retention with no acute abnormality.  Along the  descending colon which has extended into the left nephrectomy bed there is a  stable wide-mouth body wall hernia containing a portion of the descending  colon without wall thickening. No ascites or free air.  No evidence of an abscess. Pelvis:  The bladder demonstrates moderate distention with no wall  thickening.  No intraluminal calculi or gas foci.  The prostate is normal.  The rectum is normal.  Stable well-circumscribed calcification in the  posterior aspect of the pelvis which may represent remote fat infarction. Musculoskeletal structures:  There is stable body wall amorphous subcutaneous  induration with no loculated fluid collection or hematoma.  Midline  postsurgical changes with stable scarring.  No significant inguinal  lymphadenopathy. Normal bone mineral density.  Normal lumbar spine and pelvic alignment with  no acute osseous abnormality.                Preliminary result by Estefany Dooley MD (05/19/20 21:16:35)                Impression:    1. Stable chronic left lower lobe patchy ground-glass opacities compared to  the prior 05/15/2020 and 04/11/2020 exams. 2. There is nonspecific mild fluid opacification of the the gastric remnant  and duodenal bulb.  Within the bulb lumen there is a 1.3 cm ovoid soft tissue  density mass which is nonspecific. Clabe Duel is some nonspecific mild  intraluminal hyperdense material in the 3rd portion the duodenum and the  proximal jejunum just distal to the ligament of Treitz.  This could represent  contrast though intraluminal hyperdense blood in the setting of hemorrhage  cannot be excluded.   3. Stable postsurgical bowel adhesions with

## 2020-05-20 ENCOUNTER — CARE COORDINATION (OUTPATIENT)
Dept: CARE COORDINATION | Age: 50
End: 2020-05-20

## 2020-05-20 NOTE — CARE COORDINATION
Ambulatory Care Coordination  ED Follow up Call    Reason for ED visit:  Abdominal pain; nausea   Status:     not changed    Did you call your PCP prior to going to the ED? No      Did you receive a discharge instructions from the Emergency Room? Yes  Review of Instructions:     Understands what to report/when to return?:  Yes   Understands discharge instructions?:  Yes   Following discharge instructions?:  Yes   If not why? Are there any new complaints of pain? No  New Pain Meds? No    Constipation prophylaxis needed? No    If you have a wound is the dressing clean, dry, and intact? N/A  Understands wound care regimen? N/A    Are there any other complaints/concerns that you wish to tell your provider? States he is scheduled for follow up w/provider tomorrow. Declines support/needs for assistance r/t scheduling follow up. States preference to self schedule/self communicate w/provider(s) involved in his care. FU appts/Provider:    Future Appointments   Date Time Provider Timothy Beal   8/10/2020  9:00 AM Amita Mary MD [facility]   2020 12:00 PM Jacqui Kyle APRN - CNP [facility] SLE[facility]         New Medications?:   Yes    Medication Reconciliation by phone - Yes  Understands Medications? Yes  Taking Medications? Yes  Can you swallow your pills? No    Any further needs in the home i.e. Equipment? No    Link to services in community?:  No   Which services:    Patient contacted regarding COVID-19 risk and screening. Care Transition Nurse/ Ambulatory Care Manager contacted the patient by telephone to perform follow-up assessment. Verified name and  with parent as identifiers. Patient has following risk factors of: class 1 obesity (BMI 34.0-34.9). Symptoms reviewed with patient who verbalized the following symptoms: no new symptoms and no worsening symptoms. Reports 'not better' . .. 'not improving'  States he has follow up scheduled w/provider tomorrow.     Due to no new or

## 2020-05-20 NOTE — ED NOTES
Dr. Jose Ken returned call, spoke with Dr Brittny Mario.   Dr Brittny Mario discussed plan of care with patient     Tg Magaña RN  05/19/20 9953

## 2020-05-20 NOTE — ED PROVIDER NOTES
eMERGENCY dEPARTMENT eNCOUnter      Pt Name: Maria Luz Kurtz  MRN: [id number]  [place] 1970  Date of evaluation: 5/16/2020  Provider: Christian Arnold MD     [address] [place] [address]       Chief Complaint   Patient presents with    Abdominal Pain     9/10 to R abd. pt at [facility] yesterday for same. HISTORY OF PRESENT ILLNESS   (Location/Symptom, Timing/Onset,Context/Setting, Quality, Duration, Modifying Factors, Severity) Note limiting factors. HPI    Maria Luz Kurtz is a 53 y.o. male who presents to the emergency department with abdominal pain on the right side. Patient has history of multiple abdominal surgeries and was just recently seen at [facility] for the same thing. She denies any fever. Patient's pain continues. There was no associated fever nausea vomiting or diarrhea. Pain mostly localized to the right lower quadrant. And underwent a extensive work-up at [address] [place] [address] scan ultrasound which shows some cholelithiasis. Nursing Notes were reviewed. REVIEW OFSYSTEMS    (2+ for level 4; 10+ for level 5)   Review of Systems    General: No fevers, chills or night sweats, No weight loss    Head:  No Sore throat,  No Ear Pain    Chest:  Nontender. No Cough, No SOB,  Chest Pain    GI: Positive severe abdominal pain without vomiting    : No dysuria or hematuria    Musculoskeletal: No unrelenting pain or night pain    Neurologic: No bowel or bladder incontinence, No saddle anesthesia, No leg weakness    All other systems reviewed and are negative.         PAST MEDICAL HISTORY     Past Medical History:   Diagnosis Date    Alcoholism [facility])     last drink 2015    Allergic migraine with status migrainosus     Allergic rhinitis, seasonal     Anemia     Arthritis     right knee    Vaughn's esophagus     Benign intracranial hypertension     Cancer (HCC)     renal     Carpal tunnel syndrome     Chronic pain     Depression     Depression     GERD (gastroesophageal reflux disease)     thickening. No intraluminal calculi or gas foci. The prostate is normal. The rectum is normal.  Stable well-circumscribed calcification in the posterior aspect of the pelvis which may represent remote fat infarction. Musculoskeletal structures: There is stable body wall amorphous subcutaneous induration with no loculated fluid collection or hematoma. Midline postsurgical changes with stable scarring. No significant inguinal lymphadenopathy. Normal bone mineral density. Normal lumbar spine and pelvic alignment with no acute osseous abnormality. 1. Stable chronic left lower lobe patchy ground-glass opacities compared to the prior 05/15/2020 and 04/11/2020 exams. 2. There is nonspecific mild fluid opacification of the gastric remnant and duodenal bulb. Within the bulb lumen there is a 1.3 cm ovoid soft tissue density mass which is nonspecific. There is some nonspecific mild intraluminal hyperdense material in the 3rd portion of the duodenum and the proximal jejunum just distal to the ligament of Treitz. This could represent contrast though intraluminal hyperdense blood in the setting of hemorrhage cannot be excluded. 3. Stable postsurgical bowel adhesions with no acute obstruction or perforation. 4. Redemonstration of cholelithiasis with no evidence of acute cholecystitis or biliary obstruction.        ED BEDSIDE ULTRASOUND:   Performed by ED Physician - none    LABS:  Labs Reviewed   CBC WITH AUTO DIFFERENTIAL - Abnormal; Notable for the following components:       Result Value    Hemoglobin 12.6 (*)     Hematocrit 38.4 (*)     RDW 18.3 (*)     Poikilocytes 2+ (*)     Shiva Cells 2+ (*)     All other components within normal limits    Narrative:     Performed at:  [facility]  [address] [facility]   Phone [phone]   COMPREHENSIVE METABOLIC PANEL W/ REFLEX TO MG FOR LOW K - Abnormal; Notable for the following components:    BUN 6 (*)

## 2020-05-20 NOTE — ED NOTES
Dr Zabrina Shah calling back for surgery on call and speaking with Dr Ambar Chaidez, RN  05/19/20 9694

## 2020-05-21 ENCOUNTER — TELEPHONE (OUTPATIENT)
Dept: ORTHOPEDIC SURGERY | Age: 50
End: 2020-05-21

## 2020-05-21 NOTE — TELEPHONE ENCOUNTER
[facility] with [address] [place] [address] is calling to let the office know the patient has been discharged.

## 2020-05-22 ENCOUNTER — APPOINTMENT (OUTPATIENT)
Dept: CT IMAGING | Age: 50
End: 2020-05-22
Payer: COMMERCIAL

## 2020-05-22 ENCOUNTER — HOSPITAL ENCOUNTER (EMERGENCY)
Age: 50
Discharge: HOME | End: 2020-05-22
Attending: EMERGENCY MEDICINE
Payer: COMMERCIAL

## 2020-05-22 VITALS
DIASTOLIC BLOOD PRESSURE: 80 MMHG | RESPIRATION RATE: 17 BRPM | BODY MASS INDEX: 32.64 KG/M2 | HEIGHT: 72 IN | OXYGEN SATURATION: 93 % | HEART RATE: 56 BPM | WEIGHT: 240.96 LBS | TEMPERATURE: 99 F | SYSTOLIC BLOOD PRESSURE: 123 MMHG

## 2020-05-22 LAB
A/G RATIO: 1.7 (ref 1.1–2.2)
ALBUMIN SERPL-MCNC: 3.9 G/DL (ref 3.4–5)
ALP BLD-CCNC: 101 U/L (ref 40–129)
ALT SERPL-CCNC: 10 U/L (ref 10–40)
ANION GAP SERPL CALCULATED.3IONS-SCNC: 11 MMOL/L (ref 3–16)
AST SERPL-CCNC: 13 U/L (ref 15–37)
BASOPHILS ABSOLUTE: 0.1 K/UL (ref 0–0.2)
BASOPHILS RELATIVE PERCENT: 1.8 %
BILIRUB SERPL-MCNC: 0.3 MG/DL (ref 0–1)
BILIRUBIN URINE: NEGATIVE
BLOOD, URINE: NEGATIVE
BUN BLDV-MCNC: 9 MG/DL (ref 7–20)
CALCIUM SERPL-MCNC: 8.9 MG/DL (ref 8.3–10.6)
CHLORIDE BLD-SCNC: 104 MMOL/L (ref 99–110)
CLARITY: CLEAR
CO2: 24 MMOL/L (ref 21–32)
COLOR: YELLOW
CREAT SERPL-MCNC: 1 MG/DL (ref 0.9–1.3)
EOSINOPHILS ABSOLUTE: 0.2 K/UL (ref 0–0.6)
EOSINOPHILS RELATIVE PERCENT: 2.3 %
GFR AFRICAN AMERICAN: >60
GFR NON-AFRICAN AMERICAN: >60
GLOBULIN: 2.3 G/DL
GLUCOSE BLD-MCNC: 94 MG/DL (ref 70–99)
GLUCOSE URINE: NEGATIVE MG/DL
HCT VFR BLD CALC: 38.2 % (ref 40.5–52.5)
HEMOGLOBIN: 12.6 G/DL (ref 13.5–17.5)
KETONES, URINE: NEGATIVE MG/DL
LACTIC ACID: 0.8 MMOL/L (ref 0.4–2)
LEUKOCYTE ESTERASE, URINE: NEGATIVE
LIPASE: 30 U/L (ref 13–60)
LYMPHOCYTES ABSOLUTE: 2.4 K/UL (ref 1–5.1)
LYMPHOCYTES RELATIVE PERCENT: 31.2 %
MCH RBC QN AUTO: 28.5 PG (ref 26–34)
MCHC RBC AUTO-ENTMCNC: 33.1 G/DL (ref 31–36)
MCV RBC AUTO: 86.2 FL (ref 80–100)
MICROSCOPIC EXAMINATION: NORMAL
MONOCYTES ABSOLUTE: 0.6 K/UL (ref 0–1.3)
MONOCYTES RELATIVE PERCENT: 7.3 %
NEUTROPHILS ABSOLUTE: 4.4 K/UL (ref 1.7–7.7)
NEUTROPHILS RELATIVE PERCENT: 57.4 %
NITRITE, URINE: NEGATIVE
PDW BLD-RTO: 18.1 % (ref 12.4–15.4)
PH UA: 7 (ref 5–8)
PLATELET # BLD: 322 K/UL (ref 135–450)
PMV BLD AUTO: 8.4 FL (ref 5–10.5)
POTASSIUM SERPL-SCNC: 4.5 MMOL/L (ref 3.5–5.1)
PROTEIN UA: NEGATIVE MG/DL
RBC # BLD: 4.43 M/UL (ref 4.2–5.9)
SODIUM BLD-SCNC: 139 MMOL/L (ref 136–145)
SPECIFIC GRAVITY UA: 1.01 (ref 1–1.03)
TOTAL PROTEIN: 6.2 G/DL (ref 6.4–8.2)
URINE REFLEX TO CULTURE: NORMAL
URINE TYPE: NORMAL
UROBILINOGEN, URINE: 0.2 E.U./DL
WBC # BLD: 7.6 K/UL (ref 4–11)

## 2020-05-22 PROCEDURE — 2580000003 HC RX 258: Performed by: EMERGENCY MEDICINE

## 2020-05-22 PROCEDURE — 74177 CT ABD & PELVIS W/CONTRAST: CPT

## 2020-05-22 PROCEDURE — 6360000002 HC RX W HCPCS: Performed by: EMERGENCY MEDICINE

## 2020-05-22 PROCEDURE — 2500000003 HC RX 250 WO HCPCS: Performed by: EMERGENCY MEDICINE

## 2020-05-22 PROCEDURE — 83605 ASSAY OF LACTIC ACID: CPT

## 2020-05-22 PROCEDURE — 81003 URINALYSIS AUTO W/O SCOPE: CPT

## 2020-05-22 PROCEDURE — 6360000004 HC RX CONTRAST MEDICATION: Performed by: EMERGENCY MEDICINE

## 2020-05-22 PROCEDURE — 36415 COLL VENOUS BLD VENIPUNCTURE: CPT

## 2020-05-22 PROCEDURE — 99284 EMERGENCY DEPT VISIT MOD MDM: CPT

## 2020-05-22 PROCEDURE — 96375 TX/PRO/DX INJ NEW DRUG ADDON: CPT

## 2020-05-22 PROCEDURE — 85025 COMPLETE CBC W/AUTO DIFF WBC: CPT

## 2020-05-22 PROCEDURE — 96374 THER/PROPH/DIAG INJ IV PUSH: CPT

## 2020-05-22 PROCEDURE — 80053 COMPREHEN METABOLIC PANEL: CPT

## 2020-05-22 PROCEDURE — 83690 ASSAY OF LIPASE: CPT

## 2020-05-22 RX ORDER — 0.9 % SODIUM CHLORIDE 0.9 %
1000 INTRAVENOUS SOLUTION INTRAVENOUS ONCE
Status: COMPLETED | OUTPATIENT
Start: 2020-05-22 | End: 2020-05-22

## 2020-05-22 RX ORDER — ONDANSETRON 2 MG/ML
4 INJECTION INTRAMUSCULAR; INTRAVENOUS ONCE
Status: COMPLETED | OUTPATIENT
Start: 2020-05-22 | End: 2020-05-22

## 2020-05-22 RX ORDER — KETAMINE HYDROCHLORIDE 50 MG/ML
20 INJECTION, SOLUTION, CONCENTRATE INTRAMUSCULAR; INTRAVENOUS ONCE
Status: DISCONTINUED | OUTPATIENT
Start: 2020-05-22 | End: 2020-05-22 | Stop reason: SDUPTHER

## 2020-05-22 RX ORDER — OXYCODONE HYDROCHLORIDE AND ACETAMINOPHEN 5; 325 MG/1; MG/1
1 TABLET ORAL EVERY 6 HOURS PRN
Qty: 12 TABLET | Refills: 0 | Status: SHIPPED | OUTPATIENT
Start: 2020-05-22 | End: 2020-05-25 | Stop reason: SDUPTHER

## 2020-05-22 RX ORDER — KETAMINE HCL IN NACL, ISO-OSM 100MG/10ML
20 SYRINGE (ML) INJECTION ONCE
Status: COMPLETED | OUTPATIENT
Start: 2020-05-22 | End: 2020-05-22

## 2020-05-22 RX ADMIN — IOHEXOL 50 ML: 240 INJECTION, SOLUTION INTRATHECAL; INTRAVASCULAR; INTRAVENOUS; ORAL at 20:09

## 2020-05-22 RX ADMIN — SODIUM CHLORIDE 1000 ML: 9 INJECTION, SOLUTION INTRAVENOUS at 18:52

## 2020-05-22 RX ADMIN — IOVERSOL 100 ML: 678 INJECTION INTRA-ARTERIAL; INTRAVENOUS at 20:09

## 2020-05-22 RX ADMIN — ONDANSETRON 4 MG: 2 INJECTION INTRAMUSCULAR; INTRAVENOUS at 18:52

## 2020-05-22 RX ADMIN — HYDROMORPHONE HYDROCHLORIDE 1 MG: 1 INJECTION, SOLUTION INTRAMUSCULAR; INTRAVENOUS; SUBCUTANEOUS at 18:52

## 2020-05-22 RX ADMIN — Medication 20 MG: at 20:50

## 2020-05-22 ASSESSMENT — PAIN SCALES - GENERAL
PAINLEVEL_OUTOF10: 8
PAINLEVEL_OUTOF10: 9
PAINLEVEL_OUTOF10: 9
PAINLEVEL_OUTOF10: 6
PAINLEVEL_OUTOF10: 8
PAINLEVEL_OUTOF10: 6

## 2020-05-22 ASSESSMENT — PAIN DESCRIPTION - DESCRIPTORS
DESCRIPTORS: ACHING
DESCRIPTORS: SHARP;ACHING

## 2020-05-22 ASSESSMENT — PAIN DESCRIPTION - FREQUENCY: FREQUENCY: CONTINUOUS

## 2020-05-22 ASSESSMENT — PAIN DESCRIPTION - LOCATION
LOCATION: ABDOMEN
LOCATION: ABDOMEN

## 2020-05-22 ASSESSMENT — PAIN DESCRIPTION - PAIN TYPE: TYPE: ACUTE PAIN

## 2020-05-22 NOTE — ED PROVIDER NOTES
culture   Result Value Ref Range    Urine Type Voided      No results found. ED Medication Orders (From admission, onward)    Start Ordered     Status Ordering Provider    05/22/20 1847 05/22/20 1848  ioversol (OPTIRAY) 68 % injection 100 mL  IMG ONCE PRN      Joanne Venturaar VANESSA    05/22/20 1846 05/22/20 1847  iohexol (OMNIPAQUE 240) injection 50 mL  IMG ONCE PRN      Ordered Veronica Taiwo VANESSA    05/22/20 1815 05/22/20 1810  HYDROmorphone (DILAUDID) injection 1 mg  ONCE      Last MAR action:  Given - by Andreshugh Colón on 05/22/20 at 1852 Veronica Taiwo VANESSA    05/22/20 1815 05/22/20 1810  ondansetron (ZOFRAN) injection 4 mg  ONCE      Last MAR action:  Given - by Andreshugh Colón on 05/22/20 at 1852 Veronica Taiwo VANESSA    05/22/20 1815 05/22/20 1810  0.9 % sodium chloride bolus  ONCE      Last MAR action:  New Bag - by Andres Colón on 05/22/20 at [address]          Final Impression    No diagnosis found.     DISPOSITION       (Please note that portions of this note may have been completed with a voice recognition program. Efforts were made to edit the dictations but occasionally words are mis-transcribed.)    Clifton Lin MD  . Lynn Mariano MD  05/22/20 8621

## 2020-05-22 NOTE — ED PROVIDER NOTES
EXPLORATION SURGERY  1/11/16    exp lap, lysis of adhesions, small bowel resection    CARPAL TUNNEL RELEASE      bilat    DILATATION, ESOPHAGUS      ENDOSCOPY, COLON, DIAGNOSTIC      EYE SURGERY      GASTRIC BYPASS SURGERY  Jan 2009    Has lost about 200 pounds.  HERNIA REPAIR  3-    ventral    JOINT REPLACEMENT      KIDNEY REMOVAL Left 08/01/2018    KNEE ARTHROSCOPY Right 7/11/2013    Dr.Robert Walters-Adelina     KNEE ARTHROSCOPY Right 7/11/12    RIGHT KNEE ARTHROSCOPY WITH CHONDROPLASTY    KNEE SURGERY  July 2012    right, arthroscopy    KNEE SURGERY Right 2/18/2020    INCISION AND DRAINAGE RIGHT KNEE AND WOULD VAC PLACEMENT performed by Bryant Thorne MD at [address] [facility]  2005    OTHER SURGICAL HISTORY Left 03/08/2016    CT biopsy ablasion left kidney    OTHER SURGICAL HISTORY  2016    small intestine 12 inch removed, 2 hernia surgeries, another surgery to drain infection from incision.      REVISION TOTAL KNEE ARTHROPLASTY Right 12/17/2019    RIGHT REVISION TIBIA TOTAL KNEE REPLACEMENT performed by Bryant Thorne MD at [address] [facility] Right 1/22/2020    RIGHT KNEE IRRIGATION AND DEBRIDEMENT WITH POLY EXCHANGE [facility]  10/15[facility]    RIGHT    UPPER GASTROINTESTINAL ENDOSCOPY  6-7-2016    UPPER GASTROINTESTINAL ENDOSCOPY  04/02/2018     Family History   Problem Relation Age of Onset    Cancer Father         Lymphoma    Arthritis Mother     Dementia Other     Diabetes Other     Cancer Other     Diabetes Maternal Uncle     Heart Disease Maternal Uncle     Depression Other     Heart Disease Maternal Uncle      Social History     Socioeconomic History    Marital status:      Spouse name: Courtney Tyson Number of children: 2    Years of education: Not on file    Highest education level: Not on file   Occupational History    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 Social Needs    Financial resource strain: Not on file    Food insecurity     Worry: Not on file     Inability: Not on file    Transportation needs     Medical: Not on file     Non-medical: Not on file   Tobacco Use    Smoking status: Former Smoker     Packs/day: 0.50     Years: 25.00     Pack years: 12.50     Types: Cigarettes     Last attempt to quit: 2017     Years since quittin.8    Smokeless tobacco: Never Used   Substance and Sexual Activity    Alcohol use: No     Alcohol/week: 0.0 standard drinks     Comment: last drink     Drug use: No    Sexual activity: Yes     Partners: Female     Comment: wife [place]   Lifestyle    Physical activity     Days per week: Not on file     Minutes per session: Not on file    Stress: Not on file   Relationships    Social connections     Talks on phone: Not on file     Gets together: Not on file     Attends Latter day service: Not on file     Active member of club or organization: Not on file     Attends meetings of clubs or organizations: Not on file     Relationship status: Not on file    Intimate partner violence     Fear of current or ex partner: Not on file     Emotionally abused: Not on file     Physically abused: Not on file     Forced sexual activity: Not on file   Other Topics Concern    Not on file   Social History Narrative    Not on file     No current facility-administered medications for this encounter. Current Outpatient Medications   Medication Sig Dispense Refill    magnesium citrate solution Take 148 mLs by mouth once for 1 dose 148 mL 0    oxyCODONE-acetaminophen (PERCOCET) 5-325 MG per tablet Take 1 tablet by mouth every 8 hours as needed for Pain for up to 3 days. WARNING:  May cause drowsiness. May impair ability to operate vehicles or machinery.   Do not use in combination with alcohol. 9 tablet 0    aspirin 81 MG tablet Take 81 mg by mouth daily      dicyclomine (BENTYL) 10 MG capsule or gallops noted. Lungs: Breath sounds equal bilaterally and clear. Abdomen: Obese, soft, some mild diffuse right mid and lower abdominal tenderness poorly localized without guarding or rebound.  Midline lower abdominal scars well-healed.  No hernias. : No inguinal hernia appreciated. Skin: Warm and dry, good turgor.    Musculoskeletal: No lower extremity edema.  Intact symmetrical distal pulses. Neuro: Awake, alert, oriented.  No focal motor deficits    I have reviewed and interpreted all of the currently available lab results from this visit (if applicable):  No results found for this visit on 05/22/20. Radiographs (if obtained):  [] The following radiograph was interpreted by myself in the absence of a radiologist:  [x] Radiologist's Report Reviewed:    pending    EKG (if obtained): (All EKG's are interpreted by myself in the absence of a cardiologist)  Initial EKG on my interpretation shows n/a. MDM:  Differential diagnosis: Abdominal pain, SBO, sepsis, pancreatitis, intra abdominal bleeding    Patient was mildly hypotensive so IV fluid given. Dilaudid given per his request. Zofran given. At time of signout labs and imaging have not been resulted. Patient signed out to Dr. Gio Ureña  Please see his documentation for further management/disposition      Patient was given scripts for the following medications. I counseled patient how to take these medications. New Prescriptions    No medications on file         CRITICAL CARE TIME   Total Critical Care time was 0 minutes, excluding separately reportable procedures. There was a high probability of clinically significant/life threatening deterioration in the patient's condition which required my urgent intervention.       Clinical Impression:  Abdominal pain  (Please note that portions of this note may have been completed with a voice recognition program. Efforts were made to edit the dictations but occasionally words are

## 2020-05-25 ENCOUNTER — HOSPITAL ENCOUNTER (EMERGENCY)
Age: 50
Discharge: HOME | End: 2020-05-25
Attending: EMERGENCY MEDICINE
Payer: COMMERCIAL

## 2020-05-25 VITALS
SYSTOLIC BLOOD PRESSURE: 116 MMHG | HEIGHT: 72 IN | DIASTOLIC BLOOD PRESSURE: 77 MMHG | BODY MASS INDEX: 33.03 KG/M2 | TEMPERATURE: 99.1 F | WEIGHT: 243.83 LBS | HEART RATE: 76 BPM | RESPIRATION RATE: 16 BRPM | OXYGEN SATURATION: 98 %

## 2020-05-25 LAB
A/G RATIO: 1.6 (ref 1.1–2.2)
ALBUMIN SERPL-MCNC: 3.9 G/DL (ref 3.4–5)
ALP BLD-CCNC: 106 U/L (ref 40–129)
ALT SERPL-CCNC: 9 U/L (ref 10–40)
ANION GAP SERPL CALCULATED.3IONS-SCNC: 10 MMOL/L (ref 3–16)
AST SERPL-CCNC: 13 U/L (ref 15–37)
BASOPHILS ABSOLUTE: 0.1 K/UL (ref 0–0.2)
BASOPHILS RELATIVE PERCENT: 1.5 %
BILIRUB SERPL-MCNC: 0.3 MG/DL (ref 0–1)
BILIRUBIN URINE: NEGATIVE
BLOOD, URINE: NEGATIVE
BUN BLDV-MCNC: 8 MG/DL (ref 7–20)
CALCIUM SERPL-MCNC: 9.1 MG/DL (ref 8.3–10.6)
CHLORIDE BLD-SCNC: 104 MMOL/L (ref 99–110)
CLARITY: CLEAR
CO2: 27 MMOL/L (ref 21–32)
COLOR: YELLOW
CREAT SERPL-MCNC: 1.2 MG/DL (ref 0.9–1.3)
EOSINOPHILS ABSOLUTE: 0.2 K/UL (ref 0–0.6)
EOSINOPHILS RELATIVE PERCENT: 3.2 %
GFR AFRICAN AMERICAN: >60
GFR NON-AFRICAN AMERICAN: >60
GLOBULIN: 2.4 G/DL
GLUCOSE BLD-MCNC: 100 MG/DL (ref 70–99)
GLUCOSE URINE: NEGATIVE MG/DL
HCT VFR BLD CALC: 37.9 % (ref 40.5–52.5)
HEMOGLOBIN: 12.5 G/DL (ref 13.5–17.5)
KETONES, URINE: NEGATIVE MG/DL
LEUKOCYTE ESTERASE, URINE: NEGATIVE
LIPASE: 14 U/L (ref 13–60)
LYMPHOCYTES ABSOLUTE: 3.2 K/UL (ref 1–5.1)
LYMPHOCYTES RELATIVE PERCENT: 45.4 %
MCH RBC QN AUTO: 28.7 PG (ref 26–34)
MCHC RBC AUTO-ENTMCNC: 33 G/DL (ref 31–36)
MCV RBC AUTO: 86.9 FL (ref 80–100)
MICROSCOPIC EXAMINATION: NORMAL
MONOCYTES ABSOLUTE: 0.5 K/UL (ref 0–1.3)
MONOCYTES RELATIVE PERCENT: 6.6 %
NEUTROPHILS ABSOLUTE: 3.1 K/UL (ref 1.7–7.7)
NEUTROPHILS RELATIVE PERCENT: 43.3 %
NITRITE, URINE: NEGATIVE
PDW BLD-RTO: 18.7 % (ref 12.4–15.4)
PH UA: 6.5 (ref 5–8)
PLATELET # BLD: 333 K/UL (ref 135–450)
PMV BLD AUTO: 9.3 FL (ref 5–10.5)
POTASSIUM SERPL-SCNC: 4.4 MMOL/L (ref 3.5–5.1)
PROTEIN UA: NEGATIVE MG/DL
RBC # BLD: 4.36 M/UL (ref 4.2–5.9)
SODIUM BLD-SCNC: 141 MMOL/L (ref 136–145)
SPECIFIC GRAVITY UA: 1.01 (ref 1–1.03)
TOTAL PROTEIN: 6.3 G/DL (ref 6.4–8.2)
URINE REFLEX TO CULTURE: NORMAL
URINE TYPE: NORMAL
UROBILINOGEN, URINE: 0.2 E.U./DL
WBC # BLD: 7.1 K/UL (ref 4–11)

## 2020-05-25 PROCEDURE — 2580000003 HC RX 258: Performed by: EMERGENCY MEDICINE

## 2020-05-25 PROCEDURE — 36415 COLL VENOUS BLD VENIPUNCTURE: CPT

## 2020-05-25 PROCEDURE — 6360000002 HC RX W HCPCS: Performed by: EMERGENCY MEDICINE

## 2020-05-25 PROCEDURE — 99283 EMERGENCY DEPT VISIT LOW MDM: CPT

## 2020-05-25 PROCEDURE — 80053 COMPREHEN METABOLIC PANEL: CPT

## 2020-05-25 PROCEDURE — 81003 URINALYSIS AUTO W/O SCOPE: CPT

## 2020-05-25 PROCEDURE — 96376 TX/PRO/DX INJ SAME DRUG ADON: CPT

## 2020-05-25 PROCEDURE — 85025 COMPLETE CBC W/AUTO DIFF WBC: CPT

## 2020-05-25 PROCEDURE — 96374 THER/PROPH/DIAG INJ IV PUSH: CPT

## 2020-05-25 PROCEDURE — 83690 ASSAY OF LIPASE: CPT

## 2020-05-25 RX ORDER — OXYCODONE HYDROCHLORIDE AND ACETAMINOPHEN 5; 325 MG/1; MG/1
1 TABLET ORAL EVERY 8 HOURS PRN
Qty: 9 TABLET | Refills: 0 | Status: SHIPPED | OUTPATIENT
Start: 2020-05-25 | End: 2020-05-28

## 2020-05-25 RX ORDER — 0.9 % SODIUM CHLORIDE 0.9 %
1000 INTRAVENOUS SOLUTION INTRAVENOUS ONCE
Status: COMPLETED | OUTPATIENT
Start: 2020-05-25 | End: 2020-05-25

## 2020-05-25 RX ADMIN — HYDROMORPHONE HYDROCHLORIDE 1 MG: 1 INJECTION, SOLUTION INTRAMUSCULAR; INTRAVENOUS; SUBCUTANEOUS at 18:18

## 2020-05-25 RX ADMIN — HYDROMORPHONE HYDROCHLORIDE 1 MG: 1 INJECTION, SOLUTION INTRAMUSCULAR; INTRAVENOUS; SUBCUTANEOUS at 17:08

## 2020-05-25 RX ADMIN — SODIUM CHLORIDE 1000 ML: 9 INJECTION, SOLUTION INTRAVENOUS at 17:08

## 2020-05-25 ASSESSMENT — PAIN DESCRIPTION - DESCRIPTORS
DESCRIPTORS: SHARP
DESCRIPTORS: SHARP

## 2020-05-25 ASSESSMENT — PAIN SCALES - GENERAL
PAINLEVEL_OUTOF10: 8
PAINLEVEL_OUTOF10: 8
PAINLEVEL_OUTOF10: 10
PAINLEVEL_OUTOF10: 10
PAINLEVEL_OUTOF10: 9

## 2020-05-25 ASSESSMENT — PAIN DESCRIPTION - LOCATION
LOCATION: ABDOMEN

## 2020-05-25 ASSESSMENT — PAIN DESCRIPTION - FREQUENCY
FREQUENCY: CONTINUOUS
FREQUENCY: CONTINUOUS

## 2020-05-25 NOTE — ED PROVIDER NOTES
recurrent incisional hernia with mesh, removal of old mesh, bilateral component separation    ABDOMINAL EXPLORATION SURGERY  1/11/16    exp lap, lysis of adhesions, small bowel resection    CARPAL TUNNEL RELEASE      bilat    DILATATION, ESOPHAGUS      ENDOSCOPY, COLON, DIAGNOSTIC      EYE SURGERY      GASTRIC BYPASS SURGERY  Jan 2009    Has lost about 200 pounds.  HERNIA REPAIR  3-    ventral    JOINT REPLACEMENT      KIDNEY REMOVAL Left 08/01/2018    KNEE ARTHROSCOPY Right 7/11/2013    Dr.Robert Sanders     KNEE ARTHROSCOPY Right 7/11/12    RIGHT KNEE ARTHROSCOPY WITH CHONDROPLASTY    KNEE SURGERY  July 2012    right, arthroscopy    KNEE SURGERY Right 2/18/2020    INCISION AND DRAINAGE RIGHT KNEE AND WOULD VAC PLACEMENT performed by Dilcia Quinn MD at [address]  2005    OTHER SURGICAL HISTORY Left 03/08/2016    CT biopsy ablasion left kidney    OTHER SURGICAL HISTORY  2016    small intestine 12 inch removed, 2 hernia surgeries, another surgery to drain infection from incision.      REVISION TOTAL KNEE ARTHROPLASTY Right 12/17/2019    RIGHT REVISION TIBIA TOTAL KNEE REPLACEMENT performed by Dilcia Quinn MD at [address] Right 1/22/2020    RIGHT KNEE IRRIGATION AND DEBRIDEMENT WITH POLY EXCHANGE performed by Terrell Santiago MD at [address] [facility]  10/15/13    RIGHT    UPPER GASTROINTESTINAL ENDOSCOPY  6-7-2016    UPPER GASTROINTESTINAL ENDOSCOPY  04/02/2018     Family History   Problem Relation Age of Onset    Cancer Father         Lymphoma    Arthritis Mother     Dementia Other     Diabetes Other     Cancer Other     Diabetes Maternal Uncle     Heart Disease Maternal Uncle     Depression Other     Heart Disease Maternal Uncle      Social History     Socioeconomic History    Marital status:      Spouse name: Terry Townsend Number of children: 2    Years of education: Not on file    Highest education level: Not on file   Occupational History    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    Social Needs    Financial resource strain: Not on file    Food insecurity     Worry: Not on file     Inability: Not on file    Transportation needs     Medical: Not on file     Non-medical: Not on file   Tobacco Use    Smoking status: Former Smoker     Packs/day: 0.50     Years: 25.00     Pack years: 12.50     Types: Cigarettes     Last attempt to quit: 2017     Years since quittin.8    Smokeless tobacco: Never Used   Substance and Sexual Activity    Alcohol use: No     Alcohol/week: 0.0 standard drinks     Comment: last drink     Drug use: No    Sexual activity: Yes     Partners: Female     Comment: wife Geoffrey Palmer   Lifestyle    Physical activity     Days per week: Not on file     Minutes per session: Not on file    Stress: Not on file   Relationships    Social connections     Talks on phone: Not on file     Gets together: Not on file     Attends Holiness service: Not on file     Active member of club or organization: Not on file     Attends meetings of clubs or organizations: Not on file     Relationship status: Not on file    Intimate partner violence     Fear of current or ex partner: Not on file     Emotionally abused: Not on file     Physically abused: Not on file     Forced sexual activity: Not on file   Other Topics Concern    Not on file   Social History Narrative    Not on file     Current Facility-Administered Medications   Medication Dose Route Frequency Provider Last Rate Last Dose    HYDROmorphone (DILAUDID) injection 1 mg  1 mg Intravenous Once Princess Derek MD        0.9 % sodium chloride bolus  1,000 mL Intravenous Once Princess Derek MD         Current Outpatient Medications   Medication Sig Dispense Refill    dextrose 5 % SOLN 50 mL with ketamine 100 MG/ML SOLN 50 mg Infuse intravenously continuous Indications: 350 mg once every 3 months      oxyCODONE-acetaminophen (PERCOCET) 5-325 MG per tablet Take 1 tablet by mouth every 6 hours as needed for Pain for up to 3 days. Intended supply: 3 days. Take lowest dose possible to manage pain 12 tablet 0    aspirin 81 MG tablet Take 81 mg by mouth daily      dicyclomine (BENTYL) 10 MG capsule Take 1 capsule by mouth every 6 hours as needed (cramps) 20 capsule 0    traZODone (DESYREL) 100 MG tablet Take 2 tablets by mouth nightly 180 tablet 1    FLUoxetine (PROZAC) 20 MG capsule Take 1 capsule by mouth daily 90 capsule 1    acetaminophen (TYLENOL) 500 MG tablet Take 2 tablets by mouth 3 times daily (with meals) 42 tablet 0    zolpidem (AMBIEN) 10 MG tablet Take 1 tablet by mouth nightly as needed (1 po hs) for up to 90 days. 90 tablet 1    pantoprazole (PROTONIX) 40 MG tablet Take 1 tablet by mouth 2 times daily      aspirin EC 81 MG EC tablet Take 1 tablet by mouth 2 times daily for 14 days Please avoid missing doses.  28 tablet 0    atorvastatin (LIPITOR) 40 MG tablet TAKE 1 TABLET BY MOUTH EVERY NIGHT AT BEDTIME 90 tablet 1    sildenafil (REVATIO) 20 MG tablet Take 4 tablets by mouth as needed (30 min prior to intercourse) 30 tablet 5    calcium-vitamin D (OSCAL 500/200 D-3) 500-200 MG-UNIT per tablet Take 1 tablet by mouth 2 times daily       Multiple Vitamin TABS Take 1 tablet by mouth daily       magnesium citrate solution Take 148 mLs by mouth once for 1 dose 148 mL 0     Allergies   Allergen Reactions    Nsaids Nausea Only and Other (See Comments)     Hx of Barretts esophagus      Prochlorperazine Other (See Comments)     No allergic reaction, patient reported sense of \"restlessness\" and fidgiting    Valtrex [Valacyclovir Hcl] Diarrhea    Morphine Rash    Morphine And Related Itching    Tolmetin Nausea Only     Hx of Barretts esophagus       [unfilled]    Nursing Notes Reviewed    Physical Exam:  Vitals:    05/25/20 1512   BP: 118/72   Pulse: 65   Resp: 11

## 2020-05-26 ENCOUNTER — CARE COORDINATION (OUTPATIENT)
Dept: CARE COORDINATION | Age: 50
End: 2020-05-26

## 2020-05-26 NOTE — CARE COORDINATION
to enroll yes - EXTENSION consideration for self monitoring  Patient's preferred e-mail: [email or web address]   Patient's preferred phone number: [phone]  Based on Loop alert triggers, patient will be contacted by nurse care manager for worsening symptoms. Pt verbalized understanding of above and agreement with the following  Plan:  Ongoing self reporting via Get Well Loop  (ACM will request EXTENSION for pt use)  Pt will be further monitored by COVID Loop Team based on severity of symptoms and risk factors. Ambulatory Care Coordination  ED Follow up Call    Reason for ED visit:  abd pain   Status:     improved    Did you call your PCP prior to going to the ED? No      Did you receive a discharge instructions from the Emergency Room? Yes  Review of Instructions:     Understands what to report/when to return?:  Yes   Understands discharge instructions?:  Yes   Following discharge instructions?:  Yes   If not why? Are there any new complaints of pain? No  New Pain Meds? Yes; new Rx for pain med regimen    Constipation prophylaxis needed? No    If you have a wound is the dressing clean, dry, and intact? N/A  Understands wound care regimen? N/A    Are there any other complaints/concerns that you wish to tell your provider? no    FU appts/Provider:    Future Appointments   Date Time Provider Timothy Beal   8/10/2020  9:00 AM MD KATIE Bansal [facility]   9/11/2020 12:00 PM LILLIE Roe - CNP [facility]         New Medications?:   Yes- new Rx regimen for oxycodone-acetaminophen    Medication Reconciliation by phone - Yes  Understands Medications? Yes  Taking Medications? Yes  Can you swallow your pills? Yes    Any further needs in the home i.e. Equipment?   No    Link to services in community?:  N/A   Which services:

## 2020-05-27 ENCOUNTER — HOSPITAL ENCOUNTER (EMERGENCY)
Age: 50
Discharge: HOME | End: 2020-05-27
Attending: EMERGENCY MEDICINE
Payer: COMMERCIAL

## 2020-05-27 VITALS
TEMPERATURE: 99.3 F | RESPIRATION RATE: 16 BRPM | DIASTOLIC BLOOD PRESSURE: 66 MMHG | OXYGEN SATURATION: 96 % | HEART RATE: 59 BPM | SYSTOLIC BLOOD PRESSURE: 125 MMHG

## 2020-05-27 LAB
A/G RATIO: 1.5 (ref 1.1–2.2)
ALBUMIN SERPL-MCNC: 3.8 G/DL (ref 3.4–5)
ALP BLD-CCNC: 117 U/L (ref 40–129)
ALT SERPL-CCNC: 7 U/L (ref 10–40)
ANION GAP SERPL CALCULATED.3IONS-SCNC: 8 MMOL/L (ref 3–16)
AST SERPL-CCNC: 11 U/L (ref 15–37)
BASOPHILS ABSOLUTE: 0.1 K/UL (ref 0–0.2)
BASOPHILS RELATIVE PERCENT: 1.4 %
BILIRUB SERPL-MCNC: 0.3 MG/DL (ref 0–1)
BILIRUBIN URINE: NEGATIVE
BLOOD, URINE: NEGATIVE
BUN BLDV-MCNC: 7 MG/DL (ref 7–20)
CALCIUM SERPL-MCNC: 8.5 MG/DL (ref 8.3–10.6)
CHLORIDE BLD-SCNC: 105 MMOL/L (ref 99–110)
CLARITY: ABNORMAL
CO2: 25 MMOL/L (ref 21–32)
COLOR: YELLOW
CREAT SERPL-MCNC: 1.1 MG/DL (ref 0.9–1.3)
EOSINOPHILS ABSOLUTE: 0.3 K/UL (ref 0–0.6)
EOSINOPHILS RELATIVE PERCENT: 3.1 %
EPITHELIAL CELLS, UA: 0 /HPF (ref 0–5)
GFR AFRICAN AMERICAN: >60
GFR NON-AFRICAN AMERICAN: >60
GLOBULIN: 2.5 G/DL
GLUCOSE BLD-MCNC: 88 MG/DL (ref 70–99)
GLUCOSE URINE: NEGATIVE MG/DL
HCT VFR BLD CALC: 39.2 % (ref 40.5–52.5)
HEMOGLOBIN: 12.8 G/DL (ref 13.5–17.5)
HYALINE CASTS: 0 /LPF (ref 0–8)
KETONES, URINE: NEGATIVE MG/DL
LACTIC ACID: 0.6 MMOL/L (ref 0.4–2)
LEUKOCYTE ESTERASE, URINE: NEGATIVE
LIPASE: 17 U/L (ref 13–60)
LYMPHOCYTES ABSOLUTE: 2.8 K/UL (ref 1–5.1)
LYMPHOCYTES RELATIVE PERCENT: 32.6 %
MCH RBC QN AUTO: 28.2 PG (ref 26–34)
MCHC RBC AUTO-ENTMCNC: 32.7 G/DL (ref 31–36)
MCV RBC AUTO: 86.1 FL (ref 80–100)
MICROSCOPIC EXAMINATION: YES
MONOCYTES ABSOLUTE: 0.7 K/UL (ref 0–1.3)
MONOCYTES RELATIVE PERCENT: 8.8 %
NEUTROPHILS ABSOLUTE: 4.6 K/UL (ref 1.7–7.7)
NEUTROPHILS RELATIVE PERCENT: 54.1 %
NITRITE, URINE: NEGATIVE
PDW BLD-RTO: 18.2 % (ref 12.4–15.4)
PH UA: 5.5 (ref 5–8)
PLATELET # BLD: 320 K/UL (ref 135–450)
PMV BLD AUTO: 8.6 FL (ref 5–10.5)
POTASSIUM REFLEX MAGNESIUM: 4 MMOL/L (ref 3.5–5.1)
PROTEIN UA: NEGATIVE MG/DL
RBC # BLD: 4.55 M/UL (ref 4.2–5.9)
RBC UA: 0 /HPF (ref 0–4)
SODIUM BLD-SCNC: 138 MMOL/L (ref 136–145)
SPECIFIC GRAVITY UA: 1.01 (ref 1–1.03)
TOTAL PROTEIN: 6.3 G/DL (ref 6.4–8.2)
URINE REFLEX TO CULTURE: ABNORMAL
URINE TYPE: ABNORMAL
UROBILINOGEN, URINE: 0.2 E.U./DL
WBC # BLD: 8.5 K/UL (ref 4–11)
WBC UA: 1 /HPF (ref 0–5)

## 2020-05-27 PROCEDURE — 81001 URINALYSIS AUTO W/SCOPE: CPT

## 2020-05-27 PROCEDURE — 83690 ASSAY OF LIPASE: CPT

## 2020-05-27 PROCEDURE — 85025 COMPLETE CBC W/AUTO DIFF WBC: CPT

## 2020-05-27 PROCEDURE — 83605 ASSAY OF LACTIC ACID: CPT

## 2020-05-27 PROCEDURE — 6360000002 HC RX W HCPCS: Performed by: PHYSICIAN ASSISTANT

## 2020-05-27 PROCEDURE — 96374 THER/PROPH/DIAG INJ IV PUSH: CPT

## 2020-05-27 PROCEDURE — 96375 TX/PRO/DX INJ NEW DRUG ADDON: CPT

## 2020-05-27 PROCEDURE — 6370000000 HC RX 637 (ALT 250 FOR IP): Performed by: PHYSICIAN ASSISTANT

## 2020-05-27 PROCEDURE — 80053 COMPREHEN METABOLIC PANEL: CPT

## 2020-05-27 PROCEDURE — 99283 EMERGENCY DEPT VISIT LOW MDM: CPT

## 2020-05-27 PROCEDURE — 2580000003 HC RX 258: Performed by: PHYSICIAN ASSISTANT

## 2020-05-27 RX ORDER — ONDANSETRON 2 MG/ML
4 INJECTION INTRAMUSCULAR; INTRAVENOUS ONCE
Status: DISCONTINUED | OUTPATIENT
Start: 2020-05-27 | End: 2020-05-27

## 2020-05-27 RX ORDER — HYDROMORPHONE HYDROCHLORIDE 2 MG/1
1 TABLET ORAL ONCE
Status: COMPLETED | OUTPATIENT
Start: 2020-05-27 | End: 2020-05-27

## 2020-05-27 RX ORDER — 0.9 % SODIUM CHLORIDE 0.9 %
1000 INTRAVENOUS SOLUTION INTRAVENOUS ONCE
Status: COMPLETED | OUTPATIENT
Start: 2020-05-27 | End: 2020-05-27

## 2020-05-27 RX ORDER — ONDANSETRON 2 MG/ML
4 INJECTION INTRAMUSCULAR; INTRAVENOUS ONCE
Status: COMPLETED | OUTPATIENT
Start: 2020-05-27 | End: 2020-05-27

## 2020-05-27 RX ADMIN — SODIUM CHLORIDE 1000 ML: 9 INJECTION, SOLUTION INTRAVENOUS at 19:59

## 2020-05-27 RX ADMIN — HYDROMORPHONE HYDROCHLORIDE 1 MG: 2 TABLET ORAL at 22:17

## 2020-05-27 RX ADMIN — ONDANSETRON 4 MG: 2 INJECTION INTRAMUSCULAR; INTRAVENOUS at 20:41

## 2020-05-27 RX ADMIN — HYDROMORPHONE HYDROCHLORIDE 1 MG: 1 INJECTION, SOLUTION INTRAMUSCULAR; INTRAVENOUS; SUBCUTANEOUS at 20:41

## 2020-05-27 ASSESSMENT — PAIN SCALES - WONG BAKER: WONGBAKER_NUMERICALRESPONSE: 8

## 2020-05-27 ASSESSMENT — PAIN DESCRIPTION - ORIENTATION: ORIENTATION: MID

## 2020-05-27 ASSESSMENT — PAIN DESCRIPTION - DESCRIPTORS: DESCRIPTORS: CRAMPING

## 2020-05-27 ASSESSMENT — PAIN DESCRIPTION - LOCATION: LOCATION: ABDOMEN

## 2020-05-27 ASSESSMENT — PAIN DESCRIPTION - PAIN TYPE
TYPE: CHRONIC PAIN
TYPE: CHRONIC PAIN

## 2020-05-27 ASSESSMENT — PAIN DESCRIPTION - PROGRESSION: CLINICAL_PROGRESSION: GRADUALLY WORSENING

## 2020-05-27 ASSESSMENT — PAIN SCALES - GENERAL
PAINLEVEL_OUTOF10: 9

## 2020-05-27 ASSESSMENT — PAIN - FUNCTIONAL ASSESSMENT: PAIN_FUNCTIONAL_ASSESSMENT: PREVENTS OR INTERFERES SOME ACTIVE ACTIVITIES AND ADLS

## 2020-05-27 ASSESSMENT — PAIN DESCRIPTION - ONSET: ONSET: ON-GOING

## 2020-05-27 ASSESSMENT — PAIN DESCRIPTION - FREQUENCY: FREQUENCY: INTERMITTENT

## 2020-05-27 NOTE — ED PROVIDER NOTES
[address]       Pt Name: Florina Otto  MRN: [id number]  [place] 1970  Date of evaluation: 5/27/2020  Provider: JEMAL Marrufo    Shared Visit or Autonomous Visit:  I have seen and evaluated this patient with my supervising physician Yuliya Hassan, [address] [place] [address]       Chief Complaint   Patient presents with    Abdominal Pain       HISTORY OF PRESENT ILLNESS  (Location/Symptom, Timing/Onset, Context/Setting, Quality, Duration, Modifying Factors, Severity.)   Florina Otto is a 53 y.o. male who presents to the emergency department with a complaint of abdominal pain. Patient has chronic abdominal pain and is seen in the ED frequently for this complaint. Has a past history of gastric bypass surgery, small bowel obstruction, and nephrectomy. Patient says his pain is worse than usual, with some nausea, and is not eating much. Denies any abnormal bowel movements. He says he was prescribed a little bit of Percocet at his last visit and he still has a few left but is not controlling his pain. He has regularly gotten ketamine infusions at pain management monthly, but this was disrupted by the coronavirus pandemic, and he says he was due for another pain management visit in a few days but there was a problem getting it preauthorized with his insurance. Denies any new or unusual symptoms. No other complaints. Nursing Notes were reviewed and I agree. REVIEW OF SYSTEMS    (2-9 systems for level 4, 10 or more for level 5)     Constitutional:  Negative for fever, chills, appetite change, fatigue and unexpected weight change. HENT:  Negative for congestion, ear pain, facial swelling, rhinorrhea, sinus pressure, sneezing, sore throat and trouble swallowing. Eyes:  Negative for photophobia, pain and visual disturbance. Respiratory:  Negative for cough, shortness of breath, wheezing and stridor.     Cardiovascular:  Negative for chest pain, palpitations and leg swelling. Gastrointestinal: Positive for abdominal pain, nausea. Negative for vomiting, diarrhea, constipation and blood in stool. Genitourinary:  Negative for dysuria, urgency, hematuria, flank pain, and pelvic pain. Musculoskeletal:  Negative for myalgias, arthralgias, neck pain and neck stiffness. Neurological:  Negative for dizziness, seizures, syncope, speech difficulty, weakness, light-headedness, numbness and headaches. Psychiatric/Behavioral:  Negative for suicidal ideas, hallucinations, confusion, sleep disturbance and agitation. Except as noted above the remainder of the review of systems was reviewed and negative.        PAST MEDICAL HISTORY         Diagnosis Date    Alcoholism [facility])     last drink 2015    Allergic migraine with status migrainosus     Allergic rhinitis, seasonal     Anemia     Arthritis     right knee    Vaughn's esophagus     Benign intracranial hypertension     Cancer (HCC)     renal     Carpal tunnel syndrome     Chronic pain     Depression     Depression     GERD (gastroesophageal reflux disease)     Headache(784.0)     History of blood transfusion     History of tobacco use     Quit 7/2014    Migraine     chronic for 1 year    Morbid obesity ([address]     Movement disorder     Onychomycosis     Sleep apnea, obstructive     Severe uses cpap stop bang 6    Unspecified cerebral artery occlusion with cerebral infarction 2014    slight weakness in left arm    Wears dentures     full set    Wears glasses        SURGICAL HISTORY           Procedure Laterality Date    ABDOMEN SURGERY      gastric bypass    ABDOMEN SURGERY  4-7-2016    repair of recurrent incisional hernia with mesh, removal of old mesh, bilateral component separation    ABDOMINAL EXPLORATION SURGERY  1/11/16    exp lap, lysis of adhesions, small bowel resection    CARPAL TUNNEL RELEASE      bilat    DILATATION, ESOPHAGUS      ENDOSCOPY, COLON, DIAGNOSTIC      EYE SURGERY      GASTRIC BYPASS SURGERY  Jan 2009    Has lost about 200 pounds.  HERNIA REPAIR  3-    ventral    JOINT REPLACEMENT      KIDNEY REMOVAL Left 08/01/2018    KNEE ARTHROSCOPY Right 7/11/2013    Dr.Robert Sanders     KNEE ARTHROSCOPY Right 7/11/12    RIGHT KNEE ARTHROSCOPY WITH CHONDROPLASTY    KNEE SURGERY  July 2012    right, arthroscopy    KNEE SURGERY Right 2/18/2020    INCISION AND DRAINAGE RIGHT KNEE AND WOULD VAC PLACEMENT performed by Ponce Ahumada, MD at [address]  2005    OTHER SURGICAL HISTORY Left 03/08/2016    CT biopsy ablasion left kidney    OTHER SURGICAL HISTORY  2016    small intestine 12 inch removed, 2 hernia surgeries, another surgery to drain infection from incision.  REVISION TOTAL KNEE ARTHROPLASTY Right 12/17/2019    RIGHT REVISION TIBIA TOTAL KNEE REPLACEMENT performed by Ponce Ahumada, MD at [address] [facility] Right 1/22/2020    RIGHT KNEE IRRIGATION AND DEBRIDEMENT WITH POLY EXCHANGE performed by Guilherme Bobo MD at [facility]  10/15/13    RIGHT    UPPER GASTROINTESTINAL ENDOSCOPY  6-7-2016    UPPER GASTROINTESTINAL ENDOSCOPY  04/02/2018       CURRENT MEDICATIONS       Discharge Medication List as of 5/27/2020 10:12 PM      CONTINUE these medications which have NOT CHANGED    Details   dextrose 5 % SOLN 50 mL with ketamine 100 MG/ML SOLN 50 mg Infuse intravenously continuous Indications: 350 mg once every 3 monthsHistorical Med      oxyCODONE-acetaminophen (PERCOCET) 5-325 MG per tablet Take 1 tablet by mouth every 8 hours as needed for Pain for up to 3 days. Intended supply: 3 days.  Take lowest dose possible to manage pain, Disp-9 tablet, R-0Print      magnesium citrate solution Take 148 mLs by mouth once for 1 dose, Disp-148 mL, R-0Print      aspirin 81 MG tablet Take 81 mg by mouth dailyHistorical Med      dicyclomine (BENTYL) 10 MG capsule about 2 years ago. His smoking use included cigarettes. He has a 12.50 pack-year smoking history. He has never used smokeless tobacco. He reports that he does not drink alcohol or use drugs. PHYSICAL EXAM    (up to 7 for level 4, 8 or more for level 5)     ED Triage Vitals [05/27/20 1905]   BP Temp Temp Source Pulse Resp SpO2 Height Weight   (!) 142/74 99.3 °F (37.4 °C) Oral 67 16 98 % -- --       Constitutional:  Appearing well-developed and well-nourished. No distress. HENT:  Normocephalic and atraumatic. Conjunctivae and EOM are normal. Pupils are equal, round, and reactive to light. Neck:  Normal range of motion. Neck supple. No tracheal deviation present. No thyromegaly present. No cervical adenopathy. Cardiovascular:  Normal rate, regular rhythm, normal heart sounds and intact distal pulses. Pulmonary/Chest:  Effort normal and breath sounds normal. No respiratory distress. No wheezes or rales. Abdominal:  Soft. Bowel sounds are normal.  Mild tenderness to palpation diffusely throughout the abdomen. No distension, mass, rebound or guarding. Musculoskeletal:  Normal range of motion. No edema exhibited. Neurological:  Alert and oriented to person, place, and time. No cranial nerve deficit. Skin:  Skin is warm and dry. Not diaphoretic. Psychiatric:  Normal mood, affect, behavior, judgment and thought content. DIAGNOSTIC RESULTS     RADIOLOGY:   Non-plain film images such as CT, Ultrasound and MRI are read by the radiologist. Plain radiographic images are visualized and preliminarily interpreted by JEMAL Mcelroy with the below findings:    None.     Interpretation per the Radiologist below, if available at the time of this note:    No orders to display       LABS:  Labs Reviewed   CBC WITH AUTO DIFFERENTIAL - Abnormal; Notable for the following components:       Result Value    Hemoglobin 12.8 (*)     Hematocrit 39.2 (*)     RDW 18.2 (*)     All other components within normal limits scans this month, and I discussed the risk versus benefit of further CT imaging with the patient, and we agreed that there was no indication and no substantial benefit for repeat CT scanning. Patient reports he has gotten relief from his IV ketamine infusions at pain management, but that treatment is not appropriate here in the emergency department. He was given pain medications and reported a little bit of improvement. There was no indication for hospitalization or further workup. Will be discharged with instructions to follow-up with his pain management specialist.  The patient verbalized understanding and agreement with this plan of care. The patient was advised to return to the emergency department if symptoms should significantly worsen or if new and concerning symptoms should appear. I estimate there is LOW risk for ACUTE APPENDICITIS, BOWEL OBSTRUCTION, CHOLECYSTITIS, DIVERTICULITIS, INCARCERATED HERNIA, PANCREATITIS, PERFORATED BOWEL, BOWEL ISCHEMIA, GONADAL TORSION, OR CARDIAC ISCHEMIA, thus I consider the discharge disposition reasonable. Also, there is no evidence or peritonitis, sepsis, or toxicity. PROCEDURES:  None    FINAL IMPRESSION      1.  Chronic abdominal pain          DISPOSITION/PLAN   DISPOSITION Decision To Discharge 05/27/2020 10:09:35 PM      PATIENT REFERRED TO:  Your pain management specialist    Call   For follow-up care      DISCHARGE MEDICATIONS:  Discharge Medication List as of 5/27/2020 10:12 PM          (Please note that portions of this note were completed with a voice recognition program.  Efforts were made to edit the dictations but occasionally words are mis-transcribed.)    Riana Mehta, [address] [place] [address]  05/27/20 0920

## 2020-05-28 ENCOUNTER — CARE COORDINATION (OUTPATIENT)
Dept: CARE COORDINATION | Age: 50
End: 2020-05-28

## 2020-05-28 ENCOUNTER — HOSPITAL ENCOUNTER (EMERGENCY)
Age: 50
Discharge: HOME | End: 2020-05-28
Attending: EMERGENCY MEDICINE
Payer: COMMERCIAL

## 2020-05-28 VITALS
SYSTOLIC BLOOD PRESSURE: 120 MMHG | WEIGHT: 236.33 LBS | BODY MASS INDEX: 32.01 KG/M2 | DIASTOLIC BLOOD PRESSURE: 69 MMHG | TEMPERATURE: 98.9 F | OXYGEN SATURATION: 96 % | HEIGHT: 72 IN | HEART RATE: 54 BPM | RESPIRATION RATE: 18 BRPM

## 2020-05-28 PROCEDURE — 96372 THER/PROPH/DIAG INJ SC/IM: CPT

## 2020-05-28 PROCEDURE — 6370000000 HC RX 637 (ALT 250 FOR IP): Performed by: EMERGENCY MEDICINE

## 2020-05-28 PROCEDURE — 6360000002 HC RX W HCPCS: Performed by: EMERGENCY MEDICINE

## 2020-05-28 PROCEDURE — 99283 EMERGENCY DEPT VISIT LOW MDM: CPT

## 2020-05-28 RX ORDER — ONDANSETRON 4 MG/1
8 TABLET, ORALLY DISINTEGRATING ORAL ONCE
Status: COMPLETED | OUTPATIENT
Start: 2020-05-28 | End: 2020-05-28

## 2020-05-28 RX ADMIN — HYDROMORPHONE HYDROCHLORIDE 2 MG: 1 INJECTION, SOLUTION INTRAMUSCULAR; INTRAVENOUS; SUBCUTANEOUS at 16:44

## 2020-05-28 RX ADMIN — ONDANSETRON 8 MG: 4 TABLET, ORALLY DISINTEGRATING ORAL at 16:43

## 2020-05-28 ASSESSMENT — PAIN SCALES - GENERAL
PAINLEVEL_OUTOF10: 8
PAINLEVEL_OUTOF10: 8
PAINLEVEL_OUTOF10: 9
PAINLEVEL_OUTOF10: 10

## 2020-05-28 ASSESSMENT — PAIN DESCRIPTION - DESCRIPTORS: DESCRIPTORS: ACHING

## 2020-05-28 ASSESSMENT — PAIN DESCRIPTION - PAIN TYPE: TYPE: CHRONIC PAIN

## 2020-05-28 ASSESSMENT — PAIN DESCRIPTION - LOCATION: LOCATION: ABDOMEN

## 2020-05-28 ASSESSMENT — PAIN DESCRIPTION - PROGRESSION: CLINICAL_PROGRESSION: NOT CHANGED

## 2020-05-28 ASSESSMENT — PAIN DESCRIPTION - FREQUENCY: FREQUENCY: CONTINUOUS

## 2020-05-28 ASSESSMENT — PAIN DESCRIPTION - ONSET: ONSET: ON-GOING

## 2020-05-28 NOTE — ED PROVIDER NOTES
I independently performed a history and physical on Mi Brewer. All diagnostic, treatment, and disposition decisions were made by myself in conjunction with the advanced practice provider. Briefly, this is a 53 y.o. male here for chronic lower abdominal pain. States pain worsening as he has been unable to get his routine ketamine infusion from his clinic due to [address]. Requesting ketamine infusion here. On exam, Patient afebrile and nontoxic. No distress, patient resting on stretcher reading a book. Heart RRR. Lungs CTAB. Abdomen soft and nondistended. Screenings            MDM    Patient afebrile and nontoxic. He demonstrates no outward signs of distress. AVSS. Abdomen nonsurgical. Pain chronic, discussed with patient that were are unable to perform ketamine infusion as it is not emergently indicated for this purpose and is not without risk. Attempted other pain modalities. Patient is safe for discharge to self care. Will follow up with his PCP and pain clinic. Return precautions discussed. Patient Referrals: Your pain management specialist    Call   For follow-up care      Discharge Medications:  Discharge Medication List as of 5/27/2020 10:12 PM          FINAL IMPRESSION  1. Chronic abdominal pain        Blood pressure 125/66, pulse 59, temperature 99.3 °F (37.4 °C), temperature source Oral, resp. rate 16, SpO2 96 %. For further details of [address] [place] [address] emergency department encounter, please see documentation by advanced practice provider, Nav Wynne.     Lexi Blunt DO (electronically signed)  Attending Emergency Physician       Lexi Blunt DO  05/28/20 9828

## 2020-05-28 NOTE — ED PROVIDER NOTES
CHIEF COMPLAINT  Chief Complaint   Patient presents with    Abdominal Pain     10/10 to lower abd. chronic pain. visit to pain management scheduled for 6/2 per pt. HISTORY OF PRESENT ILLNESS  Nesha Castillo is a 53 y.o. male who presents to the ED complaining of longstanding history of chronic abdominal pain for which he is seen by a chronic pain specialist and receives every 2 month infusions of ketamine. Patient has been seen in the emergency department 3 times in the last week for exacerbations of his chronic pain, having received 2 abdominal CAT scans in 3 separate evaluations of blood work with negative CBC, lipase, CMP, urinalysis and a CAT scan of the abdomen on 2 separate occasions that only reveals the presence of cholelithiasis without other evidence of acute intra-abdominal findings. Patient has not had evidence of intra-abdominal abscess, diverticulitis, acute pancreatitis, acute pyelonephritis, AAA, perforation, obstruction, appendicitis. Patient returns today reporting that his previous doses of Dilaudid are helpful for approximately 24 hours but he is unable to get back to his chronic pain specialist for the next 4 days. Patient reports no fevers or chills. No dysuria or hematuria. No back pain. No rash. No chest pain or shortness of breath. No presyncope. Patient reports his pain has not changed in character, quality or severity    No other complaints, modifying factors or associated symptoms. Nursing notes reviewed.    Past Medical History:   Diagnosis Date    Alcoholism [facility])     last drink 2015    Allergic migraine with status migrainosus     Allergic rhinitis, seasonal     Anemia     Arthritis     right knee    Vaughn's esophagus     Benign intracranial hypertension     Cancer (HCC)     renal     Carpal tunnel syndrome     Chronic pain     Depression     Depression     GERD (gastroesophageal reflux disease)     Headache(784.0)     History of blood transfusion Social History Narrative    Not on file     Current Facility-Administered Medications   Medication Dose Route Frequency Provider Last Rate Last Dose    HYDROmorphone (DILAUDID) injection 2 mg  2 mg Intramuscular Once Kyara Briceno MD        ondansetron (ZOFRAN-ODT) disintegrating tablet 8 mg  8 mg Oral Once Kyara Briceno MD         Current Outpatient Medications   Medication Sig Dispense Refill    dextrose 5 % SOLN 50 mL with ketamine 100 MG/ML SOLN 50 mg Infuse intravenously continuous Indications: 350 mg once every 3 months      oxyCODONE-acetaminophen (PERCOCET) 5-325 MG per tablet Take 1 tablet by mouth every 8 hours as needed for Pain for up to 3 days. Intended supply: 3 days. Take lowest dose possible to manage pain 9 tablet 0    magnesium citrate solution Take 148 mLs by mouth once for 1 dose 148 mL 0    aspirin 81 MG tablet Take 81 mg by mouth daily      dicyclomine (BENTYL) 10 MG capsule Take 1 capsule by mouth every 6 hours as needed (cramps) 20 capsule 0    traZODone (DESYREL) 100 MG tablet Take 2 tablets by mouth nightly 180 tablet 1    FLUoxetine (PROZAC) 20 MG capsule Take 1 capsule by mouth daily 90 capsule 1    acetaminophen (TYLENOL) 500 MG tablet Take 2 tablets by mouth 3 times daily (with meals) 42 tablet 0    zolpidem (AMBIEN) 10 MG tablet Take 1 tablet by mouth nightly as needed (1 po hs) for up to 90 days. 90 tablet 1    pantoprazole (PROTONIX) 40 MG tablet Take 1 tablet by mouth 2 times daily      aspirin EC 81 MG EC tablet Take 1 tablet by mouth 2 times daily for 14 days Please avoid missing doses.  28 tablet 0    atorvastatin (LIPITOR) 40 MG tablet TAKE 1 TABLET BY MOUTH EVERY NIGHT AT BEDTIME 90 tablet 1    sildenafil (REVATIO) 20 MG tablet Take 4 tablets by mouth as needed (30 min prior to intercourse) 30 tablet 5    calcium-vitamin D (OSCAL 500/200 D-3) 500-200 MG-UNIT per tablet Take 1 tablet by mouth 2 times daily       Multiple Vitamin TABS Take 1 tablet by good positive bowel sounds. Patient's historical features have not changed for severity, frequency or quality of pain. Patient has a follow-up appointment with his chronic pain specialist in 4 days for ketamine infusions. Patient has had 3 separate evaluations within the last week including 2 CAT scans and 3 separate episodes of blood work that have revealed no acute findings. I do not feel that repeating his work-ups are warranted at this time given the fact that he has not had any change in his symptoms. I did provide the patient with 1 dose of IM Dilaudid today with instructions that he needs to follow-up with his chronic pain specialist and I do believe that this patient would heavily benefit from a care plan because of his recent frequent use of the emergency department for the treatment of his chronic pain and request for both narcotics and ketamine. I estimate there is LOW risk for ACUTE APPENDICITIS, BOWEL OBSTRUCTION, CHOLECYSTITIS, DIVERTICULITIS, STRANGULATED HERNIA, PANCREATITIS, AAA,  TESTICULAR TORSION, PERFORATED BOWEL, PYELONEPHRITIS,  BOWEL ISCHEMIA, CARDIAC ISCHEMIA, INTRA-ABDOMINAL ABSCESS, thus I consider the discharge disposition reasonable. Also, there is no evidence or peritonitis, sepsis, or toxicity. We also discussed returning to the Emergency Department immediately if new or worsening symptoms occur. Patient was given scripts for the following medications. I counseled patient how to take these medications. New Prescriptions    No medications on file         CLINICAL IMPRESSION  1. Chronic abdominal pain        Blood pressure 120/69, pulse 54, temperature 98.9 °F (37.2 °C), temperature source Oral, resp. rate 18, height 6' (1.829 m), weight 236 lb 5.3 oz (107.2 kg), SpO2 96 %.       Follow-up with:  Wander Barnett MD  [address] [place] [address] [address] [place] [address]    In 1 week            Tee Paz MD  05/28/20 3434

## 2020-05-28 NOTE — CARE COORDINATION
Ambulatory Care Coordination  ED Follow up Call    Reason for ED visit:  Abdominal pain   Status:     not changed    Did you call your PCP prior to going to the ED? Yes; unable to return for follow up w/pain  d/t insurance requiring PA. Pt uncertain re status of PA - he anticipates possibly initiated by now/in progress by pain mgmt office, but his insurance company denies receipt of submission for PA. Advised pt to remain in communication w/Pain Mgmt provider about  1) r/s follow up appt /status of PA to do so  2) plan for ketamine infusion tx, in absence of ability to receive @ provider office (eg whether it is possible for referral to infusion facility, if this is an option?)  3) any additional questions/concerns r/t overall treatment plan     Further advised pt he may need to seek additional support via practice manager to expedite process/ascertain status of PA. Did you receive a discharge instructions from the Emergency Room? Yes  Review of Instructions:     Understands what to report/when to return?:  Yes   Understands discharge instructions?:  Yes   Following discharge instructions?:  Yes   If not why? Are there any new complaints of pain? No  New Pain Meds? No    Constipation prophylaxis needed? N/A    If you have a wound is the dressing clean, dry, and intact? N/A  Understands wound care regimen? N/A    Are there any other complaints/concerns that you wish to tell your provider? Yes (above)    FU appts/Provider:    Future Appointments   Date Time Provider Timothy Beal   8/10/2020  9:00 AM Tracey Sacks, MD [facility]   9/11/2020 12:00 PM LILLIE Lock - CNP [facility]     New Medications?:   No    Medication Reconciliation by phone - Yes  Understands Medications? Yes  Taking Medications? Yes  Can you swallow your pills? Yes    Any further needs in the home i.e. Equipment?   No    Link to services in community?:  N/A   Which services:    Patient Pain Mgmt, and pt has stated clear understanding of DC instructions -  Pt verbalized understanding of above and agreement with the following  Plan:  Pt will continue use of Get Well Loop, per prior enrollment. Pt will be in direct contact to Pain  office, as reviewed above. Pt will be further monitored by COVID Loop Team based on severity of symptoms and risk factors.

## 2020-05-29 ENCOUNTER — CARE COORDINATION (OUTPATIENT)
Dept: CARE COORDINATION | Age: 50
End: 2020-05-29

## 2020-05-29 ENCOUNTER — HOSPITAL ENCOUNTER (EMERGENCY)
Age: 50
Discharge: HOME | End: 2020-05-29
Attending: EMERGENCY MEDICINE
Payer: COMMERCIAL

## 2020-05-29 VITALS
BODY MASS INDEX: 31.78 KG/M2 | SYSTOLIC BLOOD PRESSURE: 122 MMHG | DIASTOLIC BLOOD PRESSURE: 72 MMHG | WEIGHT: 234.35 LBS | TEMPERATURE: 98.5 F | HEART RATE: 74 BPM | RESPIRATION RATE: 16 BRPM | OXYGEN SATURATION: 96 %

## 2020-05-29 PROCEDURE — 99283 EMERGENCY DEPT VISIT LOW MDM: CPT

## 2020-05-29 PROCEDURE — 6370000000 HC RX 637 (ALT 250 FOR IP): Performed by: EMERGENCY MEDICINE

## 2020-05-29 RX ORDER — OXYCODONE HYDROCHLORIDE AND ACETAMINOPHEN 5; 325 MG/1; MG/1
2 TABLET ORAL ONCE
Status: COMPLETED | OUTPATIENT
Start: 2020-05-29 | End: 2020-05-29

## 2020-05-29 RX ADMIN — OXYCODONE HYDROCHLORIDE AND ACETAMINOPHEN 2 TABLET: 5; 325 TABLET ORAL at 15:51

## 2020-05-29 ASSESSMENT — PAIN DESCRIPTION - PAIN TYPE: TYPE: CHRONIC PAIN

## 2020-05-29 ASSESSMENT — PAIN SCALES - GENERAL
PAINLEVEL_OUTOF10: 9
PAINLEVEL_OUTOF10: 9

## 2020-05-29 ASSESSMENT — PAIN DESCRIPTION - FREQUENCY: FREQUENCY: CONTINUOUS

## 2020-05-29 ASSESSMENT — PAIN DESCRIPTION - ORIENTATION: ORIENTATION: LOWER

## 2020-05-29 ASSESSMENT — PAIN DESCRIPTION - DESCRIPTORS: DESCRIPTORS: ACHING;SHARP

## 2020-05-29 ASSESSMENT — PAIN DESCRIPTION - LOCATION: LOCATION: ABDOMEN

## 2020-05-29 NOTE — ED PROVIDER NOTES
Procedure Laterality Date    ABDOMEN SURGERY      gastric bypass    ABDOMEN SURGERY  4-7-2016    repair of recurrent incisional hernia with mesh, removal of old mesh, bilateral component separation    ABDOMINAL EXPLORATION SURGERY  1/11/16    exp lap, lysis of adhesions, small bowel resection    CARPAL TUNNEL RELEASE      bilat    DILATATION, ESOPHAGUS      ENDOSCOPY, COLON, DIAGNOSTIC      EYE SURGERY      GASTRIC BYPASS SURGERY  Jan 2009    Has lost about 200 pounds.  HERNIA REPAIR  3-    ventral    JOINT REPLACEMENT      KIDNEY REMOVAL Left 08/01/2018    KNEE ARTHROSCOPY Right 7/11/2013    Dr.Robert Sanders     KNEE ARTHROSCOPY Right 7/11/12    RIGHT KNEE ARTHROSCOPY WITH CHONDROPLASTY    KNEE SURGERY  July 2012    right, arthroscopy    KNEE SURGERY Right 2/18/2020    INCISION AND DRAINAGE RIGHT KNEE AND WOULD VAC PLACEMENT performed by Obey Varela MD at [address]  2005    OTHER SURGICAL HISTORY Left 03/08/2016    CT biopsy ablasion left kidney    OTHER SURGICAL HISTORY  2016    small intestine 12 inch removed, 2 hernia surgeries, another surgery to drain infection from incision.      REVISION TOTAL KNEE ARTHROPLASTY Right 12/17/2019    RIGHT REVISION TIBIA TOTAL KNEE REPLACEMENT performed by Obey Varela MD at [address] Right 1/22/2020    RIGHT KNEE IRRIGATION AND DEBRIDEMENT WITH POLY EXCHANGE performed by Theo Boas, MD [facility] [address] [facility]  10/15/13    RIGHT    UPPER GASTROINTESTINAL ENDOSCOPY  6-7-2016    UPPER GASTROINTESTINAL ENDOSCOPY  04/02/2018     Family History   Problem Relation Age of Onset    Cancer Father         Lymphoma    Arthritis Mother     Dementia Other     Diabetes Other     Cancer Other     Diabetes Maternal Uncle     Heart Disease Maternal Uncle     Depression Other     Heart Disease Maternal Uncle      Social History Socioeconomic History    Marital status:      Spouse name: Dylon Edmonds Number of children: 2    Years of education: Not on file    Highest education level: Not on file   Occupational History    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 Social Needs    Financial resource strain: Not on file    Food insecurity     Worry: Not on file     Inability: Not on file    Transportation needs     Medical: Not on file     Non-medical: Not on file   Tobacco Use    Smoking status: Former Smoker     Packs/day: 0.50     Years: 25.00     Pack years: 12.50     Types: Cigarettes     Last attempt to quit: 2017     Years since quittin.8    Smokeless tobacco: Never Used   Substance and Sexual Activity    Alcohol use: No     Alcohol/week: 0.0 standard drinks     Comment: last drink     Drug use: No    Sexual activity: Yes     Partners: Female     Comment: wife Buck Bailey   Lifestyle    Physical activity     Days per week: Not on file     Minutes per session: Not on file    Stress: Not on file   Relationships    Social connections     Talks on phone: Not on file     Gets together: Not on file     Attends Anabaptism service: Not on file     Active member of club or organization: Not on file     Attends meetings of clubs or organizations: Not on file     Relationship status: Not on file    Intimate partner violence     Fear of current or ex partner: Not on file     Emotionally abused: Not on file     Physically abused: Not on file     Forced sexual activity: Not on file   Other Topics Concern    Not on file   Social History Narrative    Not on file     No current facility-administered medications for this encounter.       Current Outpatient Medications   Medication Sig Dispense Refill    dextrose 5 % SOLN 50 mL with ketamine 100 MG/ML SOLN 50 mg Infuse intravenously continuous Indications: 350 mg once every 3 months      magnesium citrate solution Take 148 mLs by mouth once for 1 dose 148 mL 0 PANCREATITIS, or PERFORATED BOWEL or ULCER, thus I consider the discharge disposition reasonable. Also, there is no evidence or peritonitis, sepsis, or toxicity. Lavern Oakley and I have discussed the diagnosis and risks, and we agree with discharging home to follow-up with their primary doctor. We also discussed returning to the Emergency Department immediately if new or worsening symptoms occur. We have discussed the symptoms which are most concerning (e.g., bloody stool, fever, changing or worsening pain, vomiting) that necessitate immediate return. Patient was given scripts for the following medications. I counseled patient how to take these medications. New Prescriptions    No medications on file           CLINICAL IMPRESSION  1. Chronic abdominal pain        Blood pressure 122/72, pulse 74, temperature 98.5 °F (36.9 °C), temperature source Oral, resp. rate 16, weight 234 lb 5.6 oz (106.3 kg), SpO2 96 %. DISPOSITION  Patient was discharged to home in good condition. Dacia Dasilva MD  [address] [place] [address]  [phone]    Call today  For a follow up appointment. Disclaimer: All medical record entries made by [employer] dictation.       (Please note that this note was completed with a voice recognition program. Every attempt was made to edit the dictations, but inevitably there remain words that are mis-transcribed.)           Goyo Sharif MD  05/29/20 0361

## 2020-05-30 ENCOUNTER — CARE COORDINATION (OUTPATIENT)
Dept: CARE COORDINATION | Age: 50
End: 2020-05-30

## 2020-05-30 NOTE — CARE COORDINATION
of: Class I obesity; high frequency recent acute visits. Symptoms reviewed with patient who verbalized the following symptoms: no new symptoms and no worsening symptoms. Due to no new or worsening symptoms encounter was not routed to provider for escalation. However, due to high frequency subsequent return visits seeking acute care in close sequence (5 visits in past 8 days), encounter forwarded to PCP as courtesy for information/courtesy purposes (as FYI). Note: as previously stated, pt has appt scheduled w/pain mgmt 6. 2.20. Education provided regarding infection prevention, and signs and symptoms of COVID-19 and when to seek medical attention with patient who verbalized understanding. Discussed exposure protocols and quarantine from [address] you at higher risk for severe illness 2019 and given an opportunity for questions and concerns. The patient agrees to contact the COVID-19 hotline [phone] or PCP office for questions related to their healthcare. CTN/ACM provided contact information for future reference. From CDC: Are you at higher risk for severe illness?  Wash your hands often.  Avoid close contact (6 feet, which is about two arm lengths) with people who are sick.  Put distance between yourself and other people if COVID-19 is spreading in your community.  Clean and disinfect frequently touched surfaces.  Avoid all cruise travel and non-essential air travel.  Call your healthcare professional if you have concerns about COVID-19 and your underlying condition or if you are sick.     For more information on steps you can take to protect yourself, see CDC's How to Protect Yourself      Since this pt has been seen for multiple acute visits for ongoing chronic abdominal pain, is following w/pain  (scheduled this Tuesday), engages w/PCP and all providers involved in his care, AND has verbalized understanding of above -  Pt verbalized understanding of above and agreement with the following  Plan:  Pt will keep follow up appt scheduled w/Pain  this Tuesday 6. 2.20  Pt will continue to follow treatment plan/recommendations of providers involved in his care. Pt will use appropriate outreaches to: PCP and/or after hours' provider line, [phone], and/or use of 911 as appropriate. Pt will be further monitored by COVID Loop Team based on severity of symptoms and risk factors.

## 2020-06-22 ENCOUNTER — APPOINTMENT (OUTPATIENT)
Dept: CT IMAGING | Age: 50
End: 2020-06-22
Payer: COMMERCIAL

## 2020-06-22 ENCOUNTER — HOSPITAL ENCOUNTER (EMERGENCY)
Age: 50
Discharge: HOME | End: 2020-06-23
Attending: EMERGENCY MEDICINE
Payer: COMMERCIAL

## 2020-06-22 VITALS
TEMPERATURE: 97.7 F | WEIGHT: 232.37 LBS | DIASTOLIC BLOOD PRESSURE: 74 MMHG | SYSTOLIC BLOOD PRESSURE: 123 MMHG | HEIGHT: 72 IN | RESPIRATION RATE: 16 BRPM | BODY MASS INDEX: 31.47 KG/M2 | OXYGEN SATURATION: 98 % | HEART RATE: 68 BPM

## 2020-06-22 LAB
A/G RATIO: 1.8 (ref 1.1–2.2)
ALBUMIN SERPL-MCNC: 4 G/DL (ref 3.4–5)
ALP BLD-CCNC: 119 U/L (ref 40–129)
ALT SERPL-CCNC: 10 U/L (ref 10–40)
ANION GAP SERPL CALCULATED.3IONS-SCNC: 13 MMOL/L (ref 3–16)
AST SERPL-CCNC: 13 U/L (ref 15–37)
BASOPHILS ABSOLUTE: 0.1 K/UL (ref 0–0.2)
BASOPHILS RELATIVE PERCENT: 1.6 %
BILIRUB SERPL-MCNC: 0.3 MG/DL (ref 0–1)
BUN BLDV-MCNC: 9 MG/DL (ref 7–20)
CALCIUM SERPL-MCNC: 8.8 MG/DL (ref 8.3–10.6)
CHLORIDE BLD-SCNC: 105 MMOL/L (ref 99–110)
CO2: 24 MMOL/L (ref 21–32)
CREAT SERPL-MCNC: 1.1 MG/DL (ref 0.9–1.3)
EOSINOPHILS ABSOLUTE: 0.3 K/UL (ref 0–0.6)
EOSINOPHILS RELATIVE PERCENT: 3.2 %
GFR AFRICAN AMERICAN: >60
GFR NON-AFRICAN AMERICAN: >60
GLOBULIN: 2.2 G/DL
GLUCOSE BLD-MCNC: 88 MG/DL (ref 70–99)
HCT VFR BLD CALC: 37.7 % (ref 40.5–52.5)
HEMOGLOBIN: 12.5 G/DL (ref 13.5–17.5)
LIPASE: 33 U/L (ref 13–60)
LYMPHOCYTES ABSOLUTE: 3.2 K/UL (ref 1–5.1)
LYMPHOCYTES RELATIVE PERCENT: 37.9 %
MCH RBC QN AUTO: 29 PG (ref 26–34)
MCHC RBC AUTO-ENTMCNC: 33.2 G/DL (ref 31–36)
MCV RBC AUTO: 87.3 FL (ref 80–100)
MONOCYTES ABSOLUTE: 0.7 K/UL (ref 0–1.3)
MONOCYTES RELATIVE PERCENT: 7.7 %
NEUTROPHILS ABSOLUTE: 4.2 K/UL (ref 1.7–7.7)
NEUTROPHILS RELATIVE PERCENT: 49.6 %
PDW BLD-RTO: 17.6 % (ref 12.4–15.4)
PLATELET # BLD: 328 K/UL (ref 135–450)
PMV BLD AUTO: 8.7 FL (ref 5–10.5)
POTASSIUM REFLEX MAGNESIUM: 4.1 MMOL/L (ref 3.5–5.1)
RBC # BLD: 4.32 M/UL (ref 4.2–5.9)
SODIUM BLD-SCNC: 142 MMOL/L (ref 136–145)
TOTAL PROTEIN: 6.2 G/DL (ref 6.4–8.2)
WBC # BLD: 8.5 K/UL (ref 4–11)

## 2020-06-22 PROCEDURE — 85025 COMPLETE CBC W/AUTO DIFF WBC: CPT

## 2020-06-22 PROCEDURE — 93005 ELECTROCARDIOGRAM TRACING: CPT | Performed by: EMERGENCY MEDICINE

## 2020-06-22 PROCEDURE — 74177 CT ABD & PELVIS W/CONTRAST: CPT

## 2020-06-22 PROCEDURE — 83690 ASSAY OF LIPASE: CPT

## 2020-06-22 PROCEDURE — 80053 COMPREHEN METABOLIC PANEL: CPT

## 2020-06-22 PROCEDURE — 6360000002 HC RX W HCPCS: Performed by: EMERGENCY MEDICINE

## 2020-06-22 PROCEDURE — 96374 THER/PROPH/DIAG INJ IV PUSH: CPT

## 2020-06-22 PROCEDURE — 36415 COLL VENOUS BLD VENIPUNCTURE: CPT

## 2020-06-22 PROCEDURE — 99285 EMERGENCY DEPT VISIT HI MDM: CPT

## 2020-06-22 RX ORDER — ONDANSETRON 2 MG/ML
4 INJECTION INTRAMUSCULAR; INTRAVENOUS ONCE
Status: COMPLETED | OUTPATIENT
Start: 2020-06-22 | End: 2020-06-22

## 2020-06-22 RX ADMIN — ONDANSETRON 4 MG: 2 INJECTION INTRAMUSCULAR; INTRAVENOUS at 23:53

## 2020-06-22 ASSESSMENT — ENCOUNTER SYMPTOMS
EYE DISCHARGE: 0
VOMITING: 1
COUGH: 0
EYE ITCHING: 0
CHEST TIGHTNESS: 0
WHEEZING: 0
ANAL BLEEDING: 0
DIARRHEA: 0
SHORTNESS OF BREATH: 0
CHOKING: 0
STRIDOR: 0
EYE REDNESS: 0
CONSTIPATION: 0
ABDOMINAL DISTENTION: 0
PHOTOPHOBIA: 0
APNEA: 0
NAUSEA: 1
RECTAL PAIN: 0
ABDOMINAL PAIN: 1
COLOR CHANGE: 0
BACK PAIN: 0
EYE PAIN: 0
BLOOD IN STOOL: 0

## 2020-06-23 LAB
EKG ATRIAL RATE: 66 BPM
EKG DIAGNOSIS: NORMAL
EKG P AXIS: 27 DEGREES
EKG P-R INTERVAL: 162 MS
EKG Q-T INTERVAL: 392 MS
EKG QRS DURATION: 96 MS
EKG QTC CALCULATION (BAZETT): 410 MS
EKG R AXIS: 33 DEGREES
EKG T AXIS: 37 DEGREES
EKG VENTRICULAR RATE: 66 BPM

## 2020-06-23 PROCEDURE — 93010 ELECTROCARDIOGRAM REPORT: CPT | Performed by: INTERNAL MEDICINE

## 2020-06-23 PROCEDURE — 6370000000 HC RX 637 (ALT 250 FOR IP): Performed by: EMERGENCY MEDICINE

## 2020-06-23 PROCEDURE — 6360000004 HC RX CONTRAST MEDICATION: Performed by: EMERGENCY MEDICINE

## 2020-06-23 RX ORDER — OXYCODONE HYDROCHLORIDE AND ACETAMINOPHEN 5; 325 MG/1; MG/1
2 TABLET ORAL ONCE
Status: COMPLETED | OUTPATIENT
Start: 2020-06-23 | End: 2020-06-23

## 2020-06-23 RX ADMIN — IOPAMIDOL 75 ML: 755 INJECTION, SOLUTION INTRAVENOUS at 00:26

## 2020-06-23 RX ADMIN — OXYCODONE HYDROCHLORIDE AND ACETAMINOPHEN 2 TABLET: 5; 325 TABLET ORAL at 01:45

## 2020-06-23 ASSESSMENT — PAIN SCALES - GENERAL
PAINLEVEL_OUTOF10: 5
PAINLEVEL_OUTOF10: 8

## 2020-06-23 NOTE — ED PROVIDER NOTES
urgency. Musculoskeletal: Negative for arthralgias and back pain. Skin: Negative for color change and pallor. Neurological: Negative for dizziness and facial asymmetry. Hematological: Negative for adenopathy. Does not bruise/bleed easily. Psychiatric/Behavioral: Negative for agitation, behavioral problems, confusion and decreased concentration. Except as noted above the remainder of the review of systems was reviewed and negative. PAST MEDICAL HISTORY     Past Medical History:   Diagnosis Date    Alcoholism [facility])     last drink 2015    Allergic migraine with status migrainosus     Allergic rhinitis, seasonal     Anemia     Arthritis     right knee    Vaughn's esophagus     Benign intracranial hypertension     Cancer (HCC)     renal     Carpal tunnel syndrome     Chronic pain     Depression     Depression     GERD (gastroesophageal reflux disease)     Headache(784.0)     History of blood transfusion     History of tobacco use     Quit 7/2014    Migraine     chronic for 1 year    Morbid obesity ([address]     Movement disorder     Onychomycosis     Sleep apnea, obstructive     Severe uses cpap stop bang 6    Unspecified cerebral artery occlusion with cerebral infarction 2014    slight weakness in left arm    Wears dentures     full set    Wears glasses        SURGICAL HISTORY       Past Surgical History:   Procedure Laterality Date    ABDOMEN SURGERY      gastric bypass    ABDOMEN SURGERY  4-7-2016    repair of recurrent incisional hernia with mesh, removal of old mesh, bilateral component separation    ABDOMINAL EXPLORATION SURGERY  1/11/16    exp lap, lysis of adhesions, small bowel resection    CARPAL TUNNEL RELEASE      bilat    DILATATION, ESOPHAGUS      ENDOSCOPY, COLON, DIAGNOSTIC      EYE SURGERY      GASTRIC BYPASS SURGERY  Jan 2009    Has lost about 200 pounds.     HERNIA REPAIR  3-    ventral    JOINT REPLACEMENT      KIDNEY REMOVAL Left 08/01/2018  KNEE ARTHROSCOPY Right 7/11/2013    Dr.Robert Sanders     KNEE ARTHROSCOPY Right 7/11/12    RIGHT KNEE ARTHROSCOPY WITH CHONDROPLASTY    KNEE SURGERY  July 2012    right, arthroscopy    KNEE SURGERY Right 2/18/2020    INCISION AND DRAINAGE RIGHT KNEE AND WOULD VAC PLACEMENT performed by Hoang Kramer MD at [address]  2005    OTHER SURGICAL HISTORY Left 03/08/2016    CT biopsy ablasion left kidney    OTHER SURGICAL HISTORY  2016    small intestine 12 inch removed, 2 hernia surgeries, another surgery to drain infection from incision.  REVISION TOTAL KNEE ARTHROPLASTY Right 12/17/2019    RIGHT REVISION TIBIA TOTAL KNEE REPLACEMENT performed by Hoang Kramer MD at [address] [facility] Right 1/22/2020    RIGHT KNEE IRRIGATION AND DEBRIDEMENT WITH POLY EXCHANGE performed [facility] Sher[facility]arns [facility] [address] [facility] [address]  10/15/13    RIGHT    UPPER GASTROINTESTINAL ENDOSCOPY  6-7-2016    UPPER GASTROINTESTINAL ENDOSCOPY  04/02/2018       CURRENT MEDICATIONS       Previous Medications    ACETAMINOPHEN (TYLENOL) 500 MG TABLET    Take 2 tablets by mouth 3 times daily (with meals)    ASPIRIN 81 MG TABLET    Take 81 mg by mouth daily    ASPIRIN EC 81 MG EC TABLET    Take 1 tablet by mouth 2 times daily for 14 days Please avoid missing doses.     ATORVASTATIN (LIPITOR) 40 MG TABLET    TAKE 1 TABLET BY MOUTH EVERY NIGHT AT BEDTIME    CALCIUM-VITAMIN D (OSCAL 500/200 D-3) 500-200 MG-UNIT PER TABLET    Take 1 tablet by mouth 2 times daily     DEXTROSE 5 % SOLN 50 ML WITH KETAMINE 100 MG/ML SOLN 50 MG    Infuse intravenously continuous Indications: 350 mg once every 3 months    DICYCLOMINE (BENTYL) 10 MG CAPSULE    Take 1 capsule by mouth every 6 hours as needed (cramps)    FLUOXETINE (PROZAC) 20 MG CAPSULE    Take 1 capsule by mouth daily    MAGNESIUM CITRATE SOLUTION    Take 148 mLs by mouth once for 1 dose    MULTIPLE

## 2020-06-24 ENCOUNTER — CARE COORDINATION (OUTPATIENT)
Dept: CARE COORDINATION | Age: 50
End: 2020-06-24

## 2020-06-25 NOTE — CARE COORDINATION
Unable to reach pt for Care Transition follow up. Follow up vm left in repeat of yesterday's message. Encouraged pt to make direct outreach to PCP provider to plan next follow up visit or other provider recommendations; availability for use of Ready[employer]ulse 24/7 lizy, and/or availability for use of Get Well Loop. Provided and repeated callback number to reach this nurse. No further outreach planned.

## 2020-07-08 RX ORDER — ATORVASTATIN CALCIUM 40 MG/1
TABLET, FILM COATED ORAL
Qty: 90 TABLET | Refills: 0 | Status: SHIPPED | OUTPATIENT
Start: 2020-07-08 | End: 2020-10-05 | Stop reason: SDUPTHER

## 2020-07-12 ENCOUNTER — NURSE TRIAGE (OUTPATIENT)
Dept: OTHER | Facility: CLINIC | Age: 50
End: 2020-07-12

## 2020-07-12 NOTE — TELEPHONE ENCOUNTER
Reason for Disposition   [1] COVID-19 EXPOSURE (Close Contact) AND [2] within last 14 days BUT [3] NO symptoms    Answer Assessment - Initial Assessment Questions  1. CLOSE CONTACT: \"Who is the person with the confirmed or suspected COVID-19 infection that you were exposed to?\"      Family members  2. PLACE of CONTACT: \"Where were you when you were exposed to COVID-19? \" (e.g., home, school, medical waiting room; which city?)      Home, [place]   3. TYPE of CONTACT: \"How much contact was there? \" (e.g., sitting next to, live in same house, work in same office, same building)      Visiting in the same home   4. DURATION of CONTACT: \"How long were you in contact with the COVID-19 patient? \" (e.g., a few seconds, passed by person, a few minutes, live with the patient)      Couple days  5. DATE of CONTACT: \"When did you have contact with a COVID-19 patient? \" (e.g., how many days ago)      2 weeks ago  6. TRAVEL: \"Have you traveled out of the country recently? \" If so, \"When and where? \"      * Also ask about out-of-state travel, since the CDC has identified some high-risk cities for community spread in the [facility] [place]. * Note: Travel becomes less relevant if there is widespread community transmission where the patient lives. No  7. COMMUNITY SPREAD: \"Are there lots of cases of COVID-19 (community spread) where you live? \" (See public health department website, if unsure)        unsure  8. SYMPTOMS: \"Do you have any symptoms? \" (e.g., fever, cough, breathing difficulty)      no  9. PREGNANCY OR POSTPARTUM: \"Is there any chance you are pregnant? \" \"When was your last menstrual period? \" \"Did you deliver in the last 2 weeks? \"      No   10. HIGH RISK: \"Do you have any heart or lung problems? Do you have a weak immune system? \" (e.g., CHF, COPD, asthma, HIV positive, chemotherapy, renal failure, diabetes mellitus, sickle cell anemia)        splenectomy    Protocols used: CORONAVIRUS (COVID-19) EXPOSURE-ADULT-AH    Patient

## 2020-07-19 ENCOUNTER — TELEPHONE (OUTPATIENT)
Dept: FAMILY MEDICINE CLINIC | Age: 50
End: 2020-07-19

## 2020-07-20 NOTE — TELEPHONE ENCOUNTER
Please check on Juanito Pert and make sure he is feeling better today. Had vague symptoms of feeling shaky yesterday. Thanks.

## 2020-07-20 NOTE — TELEPHONE ENCOUNTER
Called him, he said he was feeling better today. Not shaky. Was accessed at ER, was told to get referral to neurologist.  He was going to call today about this. Who do you recommend ?

## 2020-07-20 NOTE — TELEPHONE ENCOUNTER
Paged me on  7/18   Was feeling shaky .   History of stroke  From discussion did not sound like a stroke but I asked him to go to ER further eval.   not sure why he was shaky  This was about  4-5 pm on Saturday

## 2020-07-24 NOTE — TELEPHONE ENCOUNTER
Responded to this by mychart message yesterday, I think. Recommended  neurology or DR José Miguel Jane.

## 2020-07-27 ENCOUNTER — OFFICE VISIT (OUTPATIENT)
Dept: ORTHOPEDIC SURGERY | Age: 50
End: 2020-07-27
Payer: COMMERCIAL

## 2020-07-27 VITALS — BODY MASS INDEX: 31.42 KG/M2 | HEIGHT: 72 IN | WEIGHT: 232 LBS | TEMPERATURE: 97.5 F

## 2020-07-27 PROCEDURE — 99214 OFFICE O/P EST MOD 30 MIN: CPT | Performed by: ORTHOPAEDIC SURGERY

## 2020-07-27 NOTE — LETTER
[address] [facility]  Surgery Scheduling Form:  [facility]    DEMOGRAPHICS:                                                                                                                Patient Name:  Jazmine Toure  Patient :  1970   Patient SS#:      Patient Phone:  [phone] (home)                            Patient Address:  [address] [place] [address]    PCP:  Hui Mosqueda MD  Insurance:    Payor/Plan Subscr  Sex Relation Sub. Ins. ID Effective Group Num   1. [address] -* Jesus Fajardo 3/14/1973 Female  [id number]                                    [id number]     DIAGNOSIS & PROCEDURE:                                                                                              Diagnosis:     Right knee infection    M25.561  Operation:  Right Total Knee replacement resection with insertion of spacer     [id number]  Location:  [place] [address]  Surgeon:  Stanley Holliday. Parris Brush MD    SCHEDULING INFORMATION:                                                                                         .  Surgeon's Scheduling Instruction:      Requested Date:    OR Time:   Patient Arrival Time:      OR Time Required:  2 hours  Anesthesia:  Choice  Equipment: Lola  Mini C-Arm:  No   Standard C-Arm:  No  Status:  Same day admit                   COVID   8-6  PAT Required:  Yes  Comments: NO femoral nerve block   ALLERGIES:Nsaids; Prochlorperazine; Valtrex [valacyclovir hcl]; Morphine; Morphine and related; and Tolmetin                     Jose Bertrand MD      20 10:49 AM  BILLING INFORMATION:                                                                                                     Procedure:       CPT Code Modifier            With Daisy Poe, [address]                       H&P AT:       PCP  XX                      URGENT CARE Johnny Grewal   Right Total Knee replacement resection with                                                                                     insertion of spacer     1970     PHYSICIANS ORDERS    HEIGHT:  6'            WEIGHT:   232      ALLERGIES:Nsaids; Prochlorperazine; Valtrex [valacyclovir hcl]; Morphine; Morphine and related; and Tolmetin                           SURG     8-12                               __________________________________________________________________  PRE-OP ORDERS:  ? CBC WITH DIFFERENTIAL                                                ? TYPE AND SCREEN                                                            ? HgB A1C                                                                               ? EKG                                                                                        ? NASAL CULUTRE MRSA  ? UAR/if positive repeat UAR on admission  ? BMP           ALBUMIN AND PREALBUMIN           VITAMIN D LEVELS  ? COAG PROFILE  ? SED RATE  ? PT/OT EVAL AND TEACHING  ? INSTRUCT PT TO STOP ALL NSAIDS, ASPIRIN, BLOOD THINNERS 7 DAYS PRIOR SURGERY  DAY OF SURGERY  ? CEFAZOLIN 2 GM IVPB; IF PATIENT WEIGHS > 80 KG AND SERUM CREATININE <2.5 mg/dl, GIVE 2 GM DOSE WITHIN 1 HOUR OF INCISION. ? IF THE PRE-OP NASAL CULTURE FOR MRSA WAS POSITIVE:   REPEAT NASAL SWAB ON ADMISSION AND ADMINISTER VANCOMYCIN 1 GM IVPB, REDUCE THE DOSE OF VANCOMYCIN  MG IVPB IF PT < 55 KG OR SERUM CREATININE > 2mg/dl; also to get Cefazolin 2 GM or wt based  ? All patients will receive preop Cefazolin 2 GM or wt based   ? APPLY KNEE HIGH ANTI-EMBOLIC AND PNEUMO-BOOTS TO UNOPERATIVE  LEG  ? CELEBREX  200 MG  ORALLY  DAY OF SURGERY  ?  ROXICODONE 10MG  ORALLY DAY OF SURGERY  OTHER ORDERS:_______________________________________________________PHYSICIAN SIGNATURE: __ 7/27/20                                                                                                       10:49 AM ________________________DATE:                          Supplemental Confidentiality & Payment Agreement & Supplemental HIPAA Notice for Shared Medical Appointments        During shared medical appointments, you will hear about other participants health issues and personal information. As a matter of trust, it is your duty to keep everything you hear confidential.  Nothing that identifies a participant in any way (including job, ethnicity, Congregation, etc.) can be shared outside of this group setting. I have read and agree to the following statements:        · I agree to meet with a group of patients and my doctor. I understand that I have the choice to be seen by my physician in this group or individually and that I may leave the group visit anytime I feel uncomfortable. · I understand that discussions will occur regarding individual identifiable health information during the shared medical appointment and I will maintain the confidentiality of [facility] health information or other information heard during the appointment. · I am committed to maintaining this confidentiality even if I am no longer participating in shared medical appointments. · I understand that the information I share with the physician and staff will be heard by the other participants and there is a possibility that the information disclosed in the shared medical appointment may be re-disclosed by other participants. I have been notified of this potential disclosure and I voluntarily agree to participate in the shared medical appointment. · I know that I dont have to share any personal information with the group or health care providers unless, I choose to do so. · Like any doctors appointment, I agree to be responsible for the bill and / or co-payment associated with this physician appointment.               Shared Medical Appointment THIS SUPPLEMENTAL NOTICE SUPPLEMENTS AND DOES NOT REPLACE THE [facility] CONSENT, PATIENT RESPONSIBILITY AND NOTICE OF PRIVACY PRACTICE. Tu Vega    1970        Patients Signature: ____________________________________________________         DATE: ____/____/___      Gila Monica / Support Persons Signature (if applicable) _________________________     DATE: __/__/__    **Each person will be asked to sign this commitment before each Shared Medical Appointment. Thank You ! SURGERY SCHEDULING TIMES                                                                                                                                                      Tu Vega                                                                                       1970                                                                           SURGERY DATE: ___/___/___                                                                      SURGERY TIME:  ___:___ AM/PM                                                                      ARRIVAL TIME:   ___:___  AM/PM                                                                                                                        **PLEASE REPORT TO THE                                                       INFORMATION DESK IN THE MAIN LOBBY                                                          OF THE HOSPITAL.         [facility]                           JET INFO     PT NAME:  Tu Vega              Patient Phone:  [phone] (home)                                           1970    PCP:  PCP:  Bridget Baker MD                APPT DATE:  ___/___/___      DATE:  20        H&P __________

## 2020-07-27 NOTE — PROGRESS NOTES
This dictation was done with [employer] dictation and may contain mechanical errors related to translation. Temperature 97.5 °F (36.4 °C), height 6' (1.829 m), weight 232 lb (105.2 kg).

## 2020-07-29 ENCOUNTER — HOSPITAL ENCOUNTER (EMERGENCY)
Age: 50
Discharge: HOME | End: 2020-07-29
Payer: COMMERCIAL

## 2020-07-29 VITALS
OXYGEN SATURATION: 97 % | DIASTOLIC BLOOD PRESSURE: 74 MMHG | SYSTOLIC BLOOD PRESSURE: 121 MMHG | HEART RATE: 67 BPM | TEMPERATURE: 98 F | RESPIRATION RATE: 16 BRPM

## 2020-07-29 PROCEDURE — 99283 EMERGENCY DEPT VISIT LOW MDM: CPT

## 2020-07-29 RX ORDER — OXYCODONE HYDROCHLORIDE AND ACETAMINOPHEN 5; 325 MG/1; MG/1
1 TABLET ORAL EVERY 6 HOURS PRN
Qty: 15 TABLET | Refills: 0 | Status: SHIPPED | OUTPATIENT
Start: 2020-07-29 | End: 2020-08-05

## 2020-07-29 ASSESSMENT — PAIN SCALES - GENERAL: PAINLEVEL_OUTOF10: 8

## 2020-07-29 ASSESSMENT — PAIN DESCRIPTION - LOCATION: LOCATION: KNEE

## 2020-07-29 ASSESSMENT — PAIN DESCRIPTION - DESCRIPTORS: DESCRIPTORS: DISCOMFORT

## 2020-07-29 ASSESSMENT — PAIN DESCRIPTION - PAIN TYPE: TYPE: ACUTE PAIN;CHRONIC PAIN

## 2020-07-29 ASSESSMENT — PAIN DESCRIPTION - ORIENTATION: ORIENTATION: RIGHT

## 2020-07-30 ENCOUNTER — TELEPHONE (OUTPATIENT)
Dept: ORTHOPEDIC SURGERY | Age: 50
End: 2020-07-30

## 2020-07-30 NOTE — ED NOTES
Discharge and education instructions reviewed. Patient verbalized understanding, teach-back successful. Patient denied questions at this time. No acute distress noted. Patient instructed to follow-up as noted - return to emergency department if symptoms worsen. Patient verbalized understanding. Discharged per EDMD with discharged instructions.        Miles Gutiérrez RN  07/29/20 3524

## 2020-07-30 NOTE — ED PROVIDER NOTES
[address]       Pt Name: Antwan Babin  MRN: [id number]  [place] 1970  Date of evaluation: 7/29/2020  Provider: JEMAL Forbes    The ED Attending Physician was available for consultation but did not see or evaluate this patient. CHIEF COMPLAINT       Chief Complaint   Patient presents with    Knee Pain     acute on chronic right knee pain. hx of knee replacement. seen [facility] on monday. HISTORY OF PRESENT ILLNESS  (Location/Symptom, Timing/Onset, Context/Setting, Quality, Duration, Modifying Factors, Severity.)   Antwan Babin is a 53 y.o. male who presents to the emergency department with a complaint of right knee pain and swelling. Patient has a past history of total knee replacement it was followed by infection and repeat surgeries. He says he has been having some increasing pain recently in the knee, saw his orthopedic doctor 2 days ago, got an x-ray, and was advised that he would likely need surgery again and he would be contacted for scheduling. Patient is here today because he says the pain is increasing, and he thinks the swelling is getting bigger, too. Says is now almost too painful to walk on. Denies having any pain medication at home, denies any recent trauma. Denies numbness. No other complaints. Nursing Notes were reviewed and I agree. REVIEW OF SYSTEMS    (2-9 systems for level 4, 10 or more for level 5)     Constitutional:  Negative for fever, chills. Respiratory:  Negative for cough, shortness of breath. Cardiovascular:  Negative for chest pain, palpitations. Gastrointestinal:  Negative for nausea, vomiting, abdominal pain. Genitourinary:  Negative for dysuria, hematuria, flank pain, and pelvic pain. Musculoskeletal: Positive for right knee pain and swelling. Negative for myalgias, neck pain and neck stiffness. Neurological:  Negative for dizziness, focal weakness, numbness.       Except as noted above the remainder of the review of systems was reviewed and negative. PAST MEDICAL HISTORY         Diagnosis Date    Alcoholism [facility])     last drink 2015    Allergic migraine with status migrainosus     Allergic rhinitis, seasonal     Anemia     Arthritis     right knee    Vaughn's esophagus     Benign intracranial hypertension     Cancer (HCC)     renal     Carpal tunnel syndrome     Chronic pain     Depression     Depression     GERD (gastroesophageal reflux disease)     Headache(784.0)     History of blood transfusion     History of tobacco use     Quit 7/2014    Migraine     chronic for 1 year    Morbid obesity ([address]     Movement disorder     Onychomycosis     Sleep apnea, obstructive     Severe uses cpap stop bang 6    Unspecified cerebral artery occlusion with cerebral infarction 2014    slight weakness in left arm    Wears dentures     full set    Wears glasses        SURGICAL HISTORY           Procedure Laterality Date    ABDOMEN SURGERY      gastric bypass    ABDOMEN SURGERY  4-7-2016    repair of recurrent incisional hernia with mesh, removal of old mesh, bilateral component separation    ABDOMINAL EXPLORATION SURGERY  1/11/16    exp lap, lysis of adhesions, small bowel resection    CARPAL TUNNEL RELEASE      bilat    DILATATION, ESOPHAGUS      ENDOSCOPY, COLON, DIAGNOSTIC      EYE SURGERY      GASTRIC BYPASS SURGERY  Jan 2009    Has lost about 200 pounds.     HERNIA REPAIR  3-    ventral    JOINT REPLACEMENT      KIDNEY REMOVAL Left 08/01/2018    KNEE ARTHROSCOPY Right 7/11/2013    Dr.Robert Sanders     KNEE ARTHROSCOPY Right 7/11/12    RIGHT KNEE ARTHROSCOPY WITH CHONDROPLASTY    KNEE SURGERY  July 2012    right, arthroscopy    KNEE SURGERY Right 2/18/2020    INCISION AND DRAINAGE RIGHT KNEE AND WOULD VAC PLACEMENT performed by Nitza Wagner MD at [address]  2005    OTHER SURGICAL HISTORY Left 03/08/2016    CT biopsy ablasion left kidney    OTHER SURGICAL HISTORY  2016    small intestine 12 inch removed, 2 hernia surgeries, another surgery to drain infection from incision.  REVISION TOTAL KNEE ARTHROPLASTY Right 12/17/2019    RIGHT REVISION TIBIA TOTAL KNEE REPLACEMENT performed by Beni Rodriguez MD at [address] Right 1/22/2020    RIGHT KNEE IRRIGATION AND DEBRIDEMENT WITH POLY EXCHANGE performed by Brendan Green MD at [address] [facility]  10/15/13    RIGHT    UPPER GASTROINTESTINAL ENDOSCOPY  6-7-2016    UPPER GASTROINTESTINAL ENDOSCOPY  04/02/2018       CURRENT MEDICATIONS       Discharge Medication List as of 7/29/2020 11:05 PM      CONTINUE these medications which have NOT CHANGED    Details   atorvastatin (LIPITOR) 40 MG tablet TAKE 1 TABLET BY MOUTH EVERY NIGHT AT BEDTIME, Disp-90 tablet,R-0Normal      dextrose 5 % SOLN 50 mL with ketamine 100 MG/ML SOLN 50 mg Infuse intravenously continuous Indications: 350 mg once every 3 monthsHistorical Med      magnesium citrate solution Take 148 mLs by mouth once for 1 dose, Disp-148 mL, R-0Print      aspirin 81 MG tablet Take 81 mg by mouth dailyHistorical Med      dicyclomine (BENTYL) 10 MG capsule Take 1 capsule by mouth every 6 hours as needed (cramps), Disp-20 capsule, R-0Print      traZODone (DESYREL) 100 MG tablet Take 2 tablets by mouth nightly, Disp-180 tablet, R-1Normal      FLUoxetine (PROZAC) 20 MG capsule Take 1 capsule by mouth daily, Disp-90 capsule, R-1Normal      acetaminophen (TYLENOL) 500 MG tablet Take 2 tablets by mouth 3 times daily (with meals), Disp-42 tablet, R-0Print      pantoprazole (PROTONIX) 40 MG tablet Take 1 tablet by mouth 2 times dailyHistorical Med      aspirin EC 81 MG EC tablet Take 1 tablet by mouth 2 times daily for 14 days Please avoid missing doses. , Disp-28 tablet, R-0Print      sildenafil (REVATIO) 20 MG tablet Take 4 tablets by mouth as needed (30 min pedis pulse on the right. Sensation to light touch grossly intact and capillary refill <3 seconds in the right lower extremity. Neurological:  Oriented to person, place, and time. No cranial nerve deficit. Skin:  Skin is warm and dry. Not diaphoretic. Psychiatric:  Normal mood, affect, behavior, judgment and thought content. DIAGNOSTIC RESULTS     RADIOLOGY:   Non-plain film images such as CT, Ultrasound and MRI are read by the radiologist. Plain radiographic images are visualized and preliminarily interpreted by Twyla Riedel, PA with the below findings:    None. Interpretation per the Radiologist below, if available at the time of this note:    No orders to display       LABS:  Labs Reviewed - No data to display    All other labs were within normal range or not returned as of this dictation. EMERGENCY DEPARTMENT COURSE and DIFFERENTIAL DIAGNOSIS/MDM:   Vitals:    Vitals:    07/29/20 2007   BP: 121/74   Pulse: 67   Resp: 16   Temp: 98 °F (36.7 °C)   TempSrc: Oral   SpO2: 97%       The patient's condition in the ED was good, the patient was afebrile and nontoxic in appearance, and the patient's physical exam was unremarkable other than for tenderness and some edema in the right knee. Patient had an orthopedic visit a few days ago and received an x-ray then, no indication for repeat imaging today. There was no indication of infection at this point. No need for lab work-up today, or for hospitalization. Patient will be discharged with prescription for a course of pain medication and advised to continue with plan of care with his orthopedic surgeon or call sooner if his symptoms continue to worsen. The patient verbalized understanding and agreement with this plan of care. The patient was advised to return to the emergency department if symptoms should significantly worsen or if new and concerning symptoms should appear.      I estimate there is LOW risk for FRACTURE, COMPARTMENT SYNDROME, DEEP VENOUS THROMBOSIS, SEPTIC ARTHRITIS, TENDON OR NEUROVASCULAR INJURY, thus I consider the discharge disposition reasonable. PROCEDURES:  None    FINAL IMPRESSION      1. Chronic pain of right knee    2. History of total knee replacement, right          DISPOSITION/PLAN   DISPOSITION Decision To Discharge 07/29/2020 10:58:17 PM      PATIENT REFERRED TO:  Chester Tsang, [address] [facility] [address] [facility]  [address] [place] [address]  [phone]      Continue your current plan of care, or call sooner if pain and swelling continue to worsen    George Gomez, [address] [facility] [address] [place] [address]  [phone]    Call   As needed, For follow-up care      DISCHARGE MEDICATIONS:  Discharge Medication List as of 7/29/2020 11:05 PM      START taking these medications    Details   oxyCODONE-acetaminophen (PERCOCET) 5-325 MG per tablet Take 1 tablet by mouth every 6 hours as needed for Pain for up to 7 days. , Disp-15 tablet,R-0Print             (Please note that portions of this note were completed with a voice recognition program.  Efforts were made to edit the dictations but occasionally words are mis-transcribed.)    Trang Jacobo, [address] [place], [place]  07/30/20 0431

## 2020-08-02 NOTE — PROGRESS NOTES
times daily (with meals)  Nish Rowland PA-C   pantoprazole (PROTONIX) 40 MG tablet Take 1 tablet by mouth 2 times daily  Candi Lopez MD   aspirin EC 81 MG EC tablet Take 1 tablet by mouth 2 times daily for 14 days Please avoid missing doses. Jamie Marcus APRN - CNP   sildenafil (REVATIO) 20 MG tablet Take 4 tablets by mouth as needed (30 min prior to intercourse)  Candi Lopez MD   calcium-vitamin D (OSCAL 500/200 D-3) 500-200 MG-UNIT per tablet Take 1 tablet by mouth 2 times daily   Historical Provider, MD   Multiple Vitamin TABS Take 1 tablet by mouth daily   Historical Provider, MD     Physical examination 67-year-old male oriented x3 temperature is 97.5. Examination of his back shows decreased range of motion but no specific pain tenderness or instability. Motor exam to the right lower extremity shows quadriceps hamstrings hip abductors and abductors foot plantar dorsiflexors are intact 4-5 over 5. Sensation and perfusion are intact to the right foot and ankle. There is no numbness or tingling noted in the right lower extremity. Examination of the right knee shows a nonhealing anterior wound. There is no obvious drainage or cellulitic process but definitely swelling and decreased range of motion. Very difficult to examine this patient's knee secondary to pain that he is having rating it at 10 out of 10. He has little to no instability from previous exams and its unlikely that he is developed instability of his knee joint at this point time. X-rays obtained today AP lateral and patellofemoral view of the right knee show status post cemented right knee arthroplasty. The overall position and orientation appear to be stable with no obvious signs of gross failure or loosening. Good patellofemoral mechanics are seen and no other obvious fractures tumors or dislocations are noted on these x-rays.     Impression 67-year-old male with persistent pain around his revision total knee arthroplasty. More than likely this represents a somewhat occult deep periprosthetic infection. And at this point time I really have nothing else to offer outside of watching and waiting except resection arthroplasty. We had a 35 minute face-to-face discussion of which greater than 50% of the time was spent in counseling with this patient concerning resection right knee arthroplasty it's preoperative evaluation and its postoperative pain management and follow-up. We went over the surgical procedure risks and complications including bleeding, infection,  neurovascular injury, decreased range of motion, persistent pain, instability, DVT, pulmonary embolism, leg length inequality, change in mental status, and need for further surgical procedures. The patient understands this and we have answered all of their questions and concerns. We will inform his infectious disease doctor. we are going to move forward with this. I did talk to the patient again about stopping smoking however whether that actually makes any effect on this patient at this point time is unknown. Really unfortunate that this very young individual now has to face the rest of his life with possible knee infection and even up to amputation as the only way to control his symptoms. We will start his preoperative evaluation see where this leads us. The patient was counseled at length about the risks of michelle Covid-19 during their perioperative period and any recovery window from their procedure. The patient was made aware that michelle Covid-19  may worsen their prognosis for recovering from their procedure  and lend to a higher morbidity and/or mortality risk. All material risks, benefits, and reasonable alternatives including postponing the procedure were discussed. The patient does wish to proceed with the procedure at this time.

## 2020-08-04 ENCOUNTER — PREP FOR PROCEDURE (OUTPATIENT)
Dept: ORTHOPEDIC SURGERY | Age: 50
End: 2020-08-04

## 2020-08-06 ENCOUNTER — HOSPITAL ENCOUNTER (OUTPATIENT)
Age: 50
End: 2020-08-06
Payer: COMMERCIAL

## 2020-08-06 ENCOUNTER — OFFICE VISIT (OUTPATIENT)
Dept: ORTHOPEDIC SURGERY | Age: 50
End: 2020-08-06
Payer: COMMERCIAL

## 2020-08-06 ENCOUNTER — HOSPITAL ENCOUNTER (OUTPATIENT)
Age: 50
Discharge: HOME | End: 2020-08-06
Payer: COMMERCIAL

## 2020-08-06 ENCOUNTER — OFFICE VISIT (OUTPATIENT)
Dept: PRIMARY CARE CLINIC | Age: 50
End: 2020-08-06
Payer: COMMERCIAL

## 2020-08-06 VITALS — WEIGHT: 241.6 LBS | BODY MASS INDEX: 32.72 KG/M2 | TEMPERATURE: 98.4 F | HEIGHT: 72 IN | RESPIRATION RATE: 16 BRPM

## 2020-08-06 LAB
ABO/RH: NORMAL
ALBUMIN SERPL-MCNC: 4.3 G/DL (ref 3.4–5)
ANION GAP SERPL CALCULATED.3IONS-SCNC: 11 MMOL/L (ref 3–16)
ANTIBODY SCREEN: NORMAL
APTT: 39.4 SEC (ref 24.2–36.2)
BASOPHILS ABSOLUTE: 0.1 K/UL (ref 0–0.2)
BASOPHILS RELATIVE PERCENT: 2.1 %
BILIRUBIN URINE: NEGATIVE
BLOOD, URINE: NEGATIVE
BUN BLDV-MCNC: 7 MG/DL (ref 7–20)
CALCIUM SERPL-MCNC: 9.2 MG/DL (ref 8.3–10.6)
CHLORIDE BLD-SCNC: 104 MMOL/L (ref 99–110)
CLARITY: CLEAR
CO2: 28 MMOL/L (ref 21–32)
COLOR: YELLOW
CREAT SERPL-MCNC: 1.1 MG/DL (ref 0.9–1.3)
EKG ATRIAL RATE: 67 BPM
EKG DIAGNOSIS: NORMAL
EKG P AXIS: 68 DEGREES
EKG P-R INTERVAL: 180 MS
EKG Q-T INTERVAL: 380 MS
EKG QRS DURATION: 84 MS
EKG QTC CALCULATION (BAZETT): 401 MS
EKG R AXIS: 35 DEGREES
EKG T AXIS: 40 DEGREES
EKG VENTRICULAR RATE: 67 BPM
EOSINOPHILS ABSOLUTE: 0.3 K/UL (ref 0–0.6)
EOSINOPHILS RELATIVE PERCENT: 4.6 %
EPITHELIAL CELLS, UA: 0 /HPF (ref 0–5)
ESTIMATED AVERAGE GLUCOSE: 114 MG/DL
GFR AFRICAN AMERICAN: >60
GFR NON-AFRICAN AMERICAN: >60
GLUCOSE BLD-MCNC: 93 MG/DL (ref 70–99)
GLUCOSE URINE: NEGATIVE MG/DL
HBA1C MFR BLD: 5.6 %
HCT VFR BLD CALC: 41.7 % (ref 40.5–52.5)
HEMOGLOBIN: 13.5 G/DL (ref 13.5–17.5)
HYALINE CASTS: 0 /LPF (ref 0–8)
INR BLD: 1.12 (ref 0.86–1.14)
KETONES, URINE: NEGATIVE MG/DL
LEUKOCYTE ESTERASE, URINE: ABNORMAL
LYMPHOCYTES ABSOLUTE: 1.8 K/UL (ref 1–5.1)
LYMPHOCYTES RELATIVE PERCENT: 28.9 %
MCH RBC QN AUTO: 28.4 PG (ref 26–34)
MCHC RBC AUTO-ENTMCNC: 32.4 G/DL (ref 31–36)
MCV RBC AUTO: 87.7 FL (ref 80–100)
MICROSCOPIC EXAMINATION: YES
MONOCYTES ABSOLUTE: 0.6 K/UL (ref 0–1.3)
MONOCYTES RELATIVE PERCENT: 8.7 %
NEUTROPHILS ABSOLUTE: 3.5 K/UL (ref 1.7–7.7)
NEUTROPHILS RELATIVE PERCENT: 55.7 %
NITRITE, URINE: NEGATIVE
PDW BLD-RTO: 15.4 % (ref 12.4–15.4)
PH UA: 6 (ref 5–8)
PLATELET # BLD: 357 K/UL (ref 135–450)
PMV BLD AUTO: 8.6 FL (ref 5–10.5)
POTASSIUM SERPL-SCNC: 4.6 MMOL/L (ref 3.5–5.1)
PREALBUMIN: 21.2 MG/DL (ref 20–40)
PROTEIN UA: NEGATIVE MG/DL
PROTHROMBIN TIME: 13 SEC (ref 10–13.2)
RBC # BLD: 4.75 M/UL (ref 4.2–5.9)
RBC UA: 0 /HPF (ref 0–4)
SEDIMENTATION RATE, ERYTHROCYTE: 5 MM/HR (ref 0–15)
SODIUM BLD-SCNC: 143 MMOL/L (ref 136–145)
SPECIFIC GRAVITY UA: 1.01 (ref 1–1.03)
URINE REFLEX TO CULTURE: ABNORMAL
URINE TYPE: ABNORMAL
UROBILINOGEN, URINE: 1 E.U./DL
VITAMIN D 25-HYDROXY: 60.4 NG/ML
WBC # BLD: 6.4 K/UL (ref 4–11)
WBC UA: 1 /HPF (ref 0–5)

## 2020-08-06 PROCEDURE — 93010 ELECTROCARDIOGRAM REPORT: CPT | Performed by: INTERNAL MEDICINE

## 2020-08-06 PROCEDURE — 85610 PROTHROMBIN TIME: CPT

## 2020-08-06 PROCEDURE — 86901 BLOOD TYPING SEROLOGIC RH(D): CPT

## 2020-08-06 PROCEDURE — 85652 RBC SED RATE AUTOMATED: CPT

## 2020-08-06 PROCEDURE — 99214 OFFICE O/P EST MOD 30 MIN: CPT | Performed by: ORTHOPAEDIC SURGERY

## 2020-08-06 PROCEDURE — 87641 MR-STAPH DNA AMP PROBE: CPT

## 2020-08-06 PROCEDURE — 83036 HEMOGLOBIN GLYCOSYLATED A1C: CPT

## 2020-08-06 PROCEDURE — 82306 VITAMIN D 25 HYDROXY: CPT

## 2020-08-06 PROCEDURE — 81001 URINALYSIS AUTO W/SCOPE: CPT

## 2020-08-06 PROCEDURE — 93005 ELECTROCARDIOGRAM TRACING: CPT

## 2020-08-06 PROCEDURE — 84134 ASSAY OF PREALBUMIN: CPT

## 2020-08-06 PROCEDURE — 85025 COMPLETE CBC W/AUTO DIFF WBC: CPT

## 2020-08-06 PROCEDURE — 36415 COLL VENOUS BLD VENIPUNCTURE: CPT

## 2020-08-06 PROCEDURE — 86850 RBC ANTIBODY SCREEN: CPT

## 2020-08-06 PROCEDURE — 85730 THROMBOPLASTIN TIME PARTIAL: CPT

## 2020-08-06 PROCEDURE — 99211 OFF/OP EST MAY X REQ PHY/QHP: CPT | Performed by: NURSE PRACTITIONER

## 2020-08-06 PROCEDURE — MISC60 ORTHOTIC REFURBISHMENT 60: Performed by: ORTHOPAEDIC SURGERY

## 2020-08-06 PROCEDURE — 86900 BLOOD TYPING SEROLOGIC ABO: CPT

## 2020-08-06 PROCEDURE — 82040 ASSAY OF SERUM ALBUMIN: CPT

## 2020-08-06 PROCEDURE — 80048 BASIC METABOLIC PNL TOTAL CA: CPT

## 2020-08-06 NOTE — PROGRESS NOTES
This dictation was done with [employer] dictation and may contain mechanical errors related to translation. Temperature 98.4 °F (36.9 °C), temperature source Infrared, resp. rate 16, height 6' (1.829 m), weight 241 lb 9.6 oz (109.6 kg).

## 2020-08-06 NOTE — PROGRESS NOTES
Patient presented to [facility] drive up clinic for preop testing. Patient was swabbed and given information advising them to remain isolated until procedure date.

## 2020-08-07 ENCOUNTER — OFFICE VISIT (OUTPATIENT)
Dept: FAMILY MEDICINE CLINIC | Age: 50
End: 2020-08-07
Payer: COMMERCIAL

## 2020-08-07 VITALS
WEIGHT: 239 LBS | HEART RATE: 67 BPM | SYSTOLIC BLOOD PRESSURE: 118 MMHG | DIASTOLIC BLOOD PRESSURE: 62 MMHG | HEIGHT: 72 IN | OXYGEN SATURATION: 99 % | BODY MASS INDEX: 32.37 KG/M2 | TEMPERATURE: 98.8 F | RESPIRATION RATE: 12 BRPM

## 2020-08-07 LAB
MRSA SCREEN RT-PCR: NORMAL
SARS-COV-2, NAA: NOT DETECTED

## 2020-08-07 PROCEDURE — 99243 OFF/OP CNSLTJ NEW/EST LOW 30: CPT | Performed by: FAMILY MEDICINE

## 2020-08-07 RX ORDER — ZOLPIDEM TARTRATE 10 MG/1
TABLET ORAL NIGHTLY PRN
COMMUNITY
End: 2020-09-14 | Stop reason: SDUPTHER

## 2020-08-07 NOTE — DISCHARGE INSTR - COC
[facility]) Continuity of Care Form    Patient Name:  Isidoro Barnard  : 1970    MRN:  [id number]    Admit date:  (Not on file)  Discharge date:  2020    Code Status Order: Prior  Advance Directives: Yes    Admitting Physician: Fabio Cooper MD  PCP: Cassie Quezada MD    Discharging Nurse: Charlotte Bajwa RN BSN  [address] Unit/Room#: No information available for this encounter. Discharging Unit Phone Number: [phone]    Emergency Contact:        Past Surgical History:  Past Surgical History:   Procedure Laterality Date    ABDOMEN SURGERY      gastric bypass    ABDOMEN SURGERY  2016    repair of recurrent incisional hernia with mesh, removal of old mesh, bilateral component separation    ABDOMINAL EXPLORATION SURGERY  16    exp lap, lysis of adhesions, small bowel resection    CARPAL TUNNEL RELEASE      bilat    DILATATION, ESOPHAGUS      ENDOSCOPY, COLON, DIAGNOSTIC      EYE SURGERY      GASTRIC BYPASS SURGERY  2009    Has lost about 200 pounds.  HERNIA REPAIR  3-    ventral    JOINT REPLACEMENT      KIDNEY REMOVAL Left 2018    [facility]     KNEE ARTHROSCOPY Right 12    RIGHT KNEE ARTHROSCOPY WITH CHONDROPLASTY    KNEE SURGERY  2012    right, arthroscopy    KNEE SURGERY Right 2020    INCISION AND DRAINAGE RIGHT KNEE AND WOULD VAC PLACEMENT performed by Fabio Cooper MD at [address] [facility]      OTHER SURGICAL HISTORY Left 2016    CT biopsy ablasion left kidney    OTHER SURGICAL HISTORY      small intestine 12 inch removed, 2 hernia surgeries, another surgery to drain infection from incision.      REVISION TOTAL KNEE ARTHROPLASTY Right 2019    RIGHT REVISION TIBIA TOTAL KNEE REPLACEMENT performed by Fabio Cooper MD at [address] [facility] Right 2020    RIGHT KNEE IRRIGATION AND DEBRIDEMENT WITH POLY EXCHANGE performed by Demetrius Delong MD at [address] [facility]  10/15/13    RIGHT    UPPER GASTROINTESTINAL ENDOSCOPY  6-7-2016    UPPER GASTROINTESTINAL ENDOSCOPY  04/02/2018       Immunization History:   Immunization History   Administered Date(s) Administered    HIB PRP-T (ActHIB, Hiberix) 09/13/2010    Hib, unspecified 02/09/2015    Influenza Virus Vaccine 02/03/2018    Influenza Whole 10/04/2012    Influenza, Intradermal, Preservative free 10/28/2014    Influenza, Intradermal, Quadrivalent, Preservative Free 12/13/2016    Influenza, Quadv, IM, PF (6 mo and older Fluzone, Flulaval, Fluarix, and 3 yrs and older Afluria) 12/18/2019    Influenza, Quadv, Recombinant, IM PF (Flublok 18 yrs and older) 10/15/2018    Meningococcal ACWY Vaccine 01/15/2009    Meningococcal MCV4O (Menveo) 08/10/2019, 10/30/2019    Meningococcal MCV4P (Menactra) 02/09/2015, 08/17/2019, 10/30/2019    Meningococcal MPSV4 (Menomune) 01/15/2009    Pneumococcal Conjugate 13-valent ([id number]) 02/09/2015    Pneumococcal Polysaccharide ([id number]) 01/15/2009, 10/09/2013, 08/09/2019    Td, unspecified formulation 01/01/2007    Tdap (Boostrix, Adacel) 01/01/2007, 05/03/2014       Active Problems:  Active Problems:    * No active hospital problems. *  Resolved Problems:    * No resolved hospital problems. *      Isolation/Infection:       Nurse Assessment:  Last Vital Signs: There were no vitals taken for this visit. Last documented pain score (0-10 scale):    Last Weight:   Wt Readings from Last 1 Encounters:   08/06/20 241 lb 9.6 oz (109.6 kg)     Mental Status:  oriented and alert     IV Access:  - None    Nursing Mobility/ADLs:  Walking   Assisted  Transfer  Assisted  Bathing  Assisted  Dressing  Assisted  Toileting  Assisted  Feeding  Independent  Med Admin  Independent  Med Delivery   whole    Wound Care Documentation and Therapy:  Keep glued Prineo dressing clean and intact.  DO NOT remove. Jass is waterproof for showering. Doctor will evaluated dressing for removal at office visit 2 weeks after surgery  Wound 04/06/20 Knee Right right knee (Active)   Number of days: 122        Elimination:  Urinary Catheter: None   Colostomy/Ileostomy: No  Continence:   · Bowel: Yes  · Bladder: Yes  Date of Last BM: 8/13/2020    Intake/Output Summary (Last 24 hours)   No intake or output data in the 24 hours ending 08/07/20 1021  Safety Concerns: At Risk for Falls    Impairments/Disabilities:      Vision    Nutrition Therapy:  Current Nutrition Therapy: general  Routes of Feeding: Oral  Liquids: No Restrictions  Daily Fluid Restriction: no  Last Modified Barium Swallow with Video (Video Swallowing Test): not done    Treatments at the Time of Hospital Discharge:   Respiratory Treatments:   Oxygen Therapy:  is not on home oxygen therapy. Ventilator:    cpap, device from home, only when sleeping    Lab orders for discharge:        Rehab Therapies: Physical Therapy, Occupational Therapy and nursing care  Weight Bearing Status/Restrictions: No weight bearing restirctions.  WBAT  Other Medical Equipment (for information only, NOT a DME order):  Rolling walker  Other Treatments: ASA 81mg twice at day for 14 days for DVT prophylaxis, bilateral MAIH hose for 2 weeks after surgery    Patient's personal belongings (please select all that are sent with patient):  Glasses    RN SIGNATURE:  Electronically signed by Iam Fofana RN on 8/13/20 at 8:27 AM EDT    PHYSICIAN SECTION    Prognosis: Good    Condition at Discharge: Stable    IV Daptomycin x 600 mg x Q 24 hrs x stop date X 9/30  IV Cefepime x 2 gm x Q 12 HR X STOP DATE X 9/30  CBC with diff, BMP, ESR, CRP, CK weekly  Fax results to  [phone]  Follow up  x 6 weeks  HOLD Atorvastatin while on IV antibiotics    Cathleen Pink MD  Infectious [address] Physician  Phone: [phone]   Fax : [phone]      Rehab Potential (if transferring to Rehab): Good    Physician Certification: I certify the above orders, information, and transfer of Nayely Pruett is necessary for the continuing treatment of the diagnosis listed and that he requires [address] for less 30 days. Update Admission H&P: No change in H&P    PHYSICIAN SIGNATURE:  Electronically signed by LILLIE Downey CNP on 8/7/20 at 10:21 AM EDT/ Dr James Wheeler Status Date: ***    isinger Readmission Risk Assessment Score:    Discharging to Facility/ Agency   Name:  [facility] care    Address: [address] [facility] [address] [place] [address]., [facility]  Phone: [phone]  Fax: [phone]    / signature: Electronically signed by Daniel Abreu on 8/14/20 at 9:46 AM EDT    Followup with Dr Lily Walters 2 weeks after surgery in office   [phone]  [address] [place] [address] Sports Medicine, [address]   [facility]  Activity:   Elevate your leg if swelling occurs in your ankle. Use elastic wraps/hose until swelling decreases.  Continue the exercise program as prescribed by physical therapists.  Take frequent walks.  Use walker, crutches, or cane with weight bearing instructions as indicated by the physical therapists.  Take rest periods often. Elevate leg during rest period. Wound Care:   Cover the wound with a sterile gauze dressing and change daily as long as there is drainage.  Do not scrub wound. Pat it dry with a soft towel.  Dont apply any lotions or creams to your wound.  Check the incision every day for redness, swelling, or increase in drainage. Diet:   You can resume your normal diet. There are no limits on your diet due to your surgery.  Pain pills and activity changes may lead to constipation. To prevent this, use prune juice or bran cereals liberally.   You may need to use a laxative such as Dulcolax, Senokot, or Milk of Magnesia.  Drink plenty of fluids. Medications:   Take pain pills if needed to maintain comfort.  Never drive while taking pain medicine.  Avoid over the counter medications until checking with your doctor.  Resume previous medications as instructed by your doctor. You will be on aspirin or Eliquis  for 14 days only. Stay off other anti-inflammatory medications (except Celebrex)  Call Your Doctor If:  Audra Trevizo You have increased pain not controlled by medications.  Excessive swelling in your ankle.  You develop numbness, tingling, or decreased movement.  You have a fever greater than 100 degrees for a day or over 101 degrees at any one time.  Your wound becomes more reddened, starts draining, or opens.  If you fall. You have any questions about your recovery. Inform your family doctor/dentist or any other doctor who cares for you in the future that you have a joint replacement. You may need antibiotics for dental or surgical procedures if there is any evidence of infection present. ? If you have required the use of insulin to control your blood sugar after surgery, follow up with your family doctor. ? Call your surgeons office to schedule your appointment to be seen after surgery. ? Make your appointment to continue physical therapy per doctors orders. ?  Smoking cessation assistance can be obtained from your family doctor or by calling [place] @ [phone]    _______________________________   _____   _______________________  ____                Patient Signature              Date      Witness                               Date

## 2020-08-07 NOTE — PROGRESS NOTES
Preoperative Screening for Elective Surgery/Invasive Procedures While COVID-19 present in the community     Have you tested positive or have been told to self-isolate for COVID-19 like symptoms within the past 28 days? NO   Do you currently have any of the following symptoms? NO  o Fever >100.0 F or 99.9 F in immunocompromised patients? NO  o New onset cough, shortness of breath or difficulty breathing? NO  o New onset sore throat, myalgia (muscle aches and pains), headache, loss of taste/smell or diarrhea? NO   Have you had a potential exposure to COVID-19 within the past 14 days by:  o Close contact with a confirmed case? NO  o Close contact with a healthcare worker,  or essential infrastructure worker (grocery store, [employer], gas station, public utilities or transportation)? YES  o Do you reside in a congregate setting such as; skilled nursing facility, adult home, correctional facility, homeless shelter or other institutional setting? NO  o Have you had recent travel to a known COVID-19 hotspot? NO    Indicate if the patient has a positive screen by answering yes to one or more of the above questions. Patients who test positive or screen positive prior to surgery or on the day of surgery should be evaluated in conjunction with the surgeon/proceduralist/anesthesiologist to determine the urgency of the procedure.

## 2020-08-07 NOTE — PROGRESS NOTES
Subjective:      Antwan Babin is a 53 y.o.  male who presents to the office today for a preoperative consultation at the request of surgeon Dr Billy Bravo who plans on performing revision R TKA on August 12. He had prior TKA done 2012 by a Dr Sita Elam. He had some failure of that recently with pain and swelling. Had partial revision with exchange of the plastic spacer. This did not help enough and then it got infected. They did surgical clean out and IV antibiotics for 2 onths, managed     Duration of problem: longstanding OA of knee, limiting activity. Assoc sx are: pain, swelling     Alleviating factors are: injections     This consultation is requested for thespecific conditions prompting preoperative evaluation (i.e. because of potential affect on operative risk):     Remains alcohol free; prior etoh dependence.     Hx CVA in 2014; mild residual LUE weakness; on asa/atorva without recurrence.     CPAP used for agustina; Dr Crystal Molina; on [place] and trazodone for sleep induction and maintenance.     No longer smokes; quit after his stroke. He does vape without nicotine.     S/p gastric bypass; NSAID's contraindicated. Dr Mary Carmen Acevedo.     S/p splenectomy at time of gastric bypass surgery because there was an abscess on it.       On fluox for depression. Mood good since quitting drinking.     Remains on Protonix for Vaughn's esophagus, last EGD done by DR Diaz Ro 2015?     Hx chronic abd pain from multiple abscesses and ventral hernia repairs and adhesions. Sees Dr Juan Ernst at pain clinic (Ketamine infusions Q3 mo). No daily pain medicines. Last infusion was last week, which has helped a lot. He also had a celiac plexus block. At [facility] in [place], [place].     S/p nephrectomy on left for [address] [employer] 8/18. No recurrences. Last renal function test: stable.   Lab Results   Component Value Date     08/06/2020    K 4.6 08/06/2020    K 4.1 06/22/2020    BUN 7 08/06/2020    CREATININE 1.1 08/06/2020        Patient Active Problem List   Diagnosis    Insomnia-chronic intermittent--uses prn ambien--to be filled by dr Yaakov Habermann (sleep dr)   Radha Granados daily ppi-last egd 5/15--dr Nubia Montes De Oca History of tobacco useadvised to quit- quit 7/1/2014    Allergic rhinitis, seasonal    Obstructive sleep apnea,Severe -(on cpap)    Class 1 obesity due to excess calories with body mass index (BMI) of 34.0 to 34.9 in adult    Depression-on daily effexor xr--sees dr Tanya Beckford    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  Chondromalacia of patellofemoral joint    Chronic pain syndrome    History of stroke    Gastroesophageal reflux disease with esophagitis--on daily ppi    Intractable chronic migraine without aura and with status migrainosus    Iron deficiency anemia    B12 deficiency    Anastomotic ulcer    Malignant neoplasm of left kidney (HCC) clear cell. 8/1/18 Dr Rosa Maria Suarez.  Total knee replacement status, right    Failed total knee, right, initial encounter ([address]    Infection of total knee replacement ([address]    History of depression    History of alcoholism ([address]    Receiving intravenous antibiotic treatment as outpatient    SBO (small bowel obstruction) ([address]    Partial small bowel obstruction ([address]        Planned anesthesia is General.    The patient has the following knownanesthesia issues: none. Patient has a bleeding risk of : no recent abnormal bleeding    Patient does not have objection to receiving blood products if needed.     Past Medical History:   Diagnosis Date    Alcoholism [facility])     last drink 2015    Allergic migraine with status migrainosus     Allergic rhinitis, seasonal     Anemia     Arthritis     right knee    Vaughn's esophagus     Benign intracranial hypertension     Cancer (HCC)     renal     Carpal tunnel syndrome     Chronic pain     Depression     Depression     GERD (gastroesophageal reflux disease)     Headache(784.0)     History of blood transfusion     History of tobacco use     Quit 7/2014    Migraine     chronic for 1 year    Morbid obesity ([address]     Movement disorder     Onychomycosis     Sleep apnea, obstructive     Severe uses cpap stop bang 6    Unspecified cerebral artery occlusion with cerebral infarction 2014    slight weakness in left arm    Wears dentures     full set    Wears glasses      Past Surgical History:   Procedure Laterality Date    ABDOMEN SURGERY      gastric bypass    ABDOMEN SURGERY  4-7-2016    repair of recurrent incisional hernia with mesh, removal of old mesh, bilateral component separation    ABDOMINAL EXPLORATION SURGERY  1/11/16    exp lap, lysis of adhesions, small bowel resection    CARPAL TUNNEL RELEASE      bilat    DILATATION, ESOPHAGUS      ENDOSCOPY, COLON, DIAGNOSTIC      EYE SURGERY      GASTRIC BYPASS SURGERY  Jan 2009    Has lost about 200 pounds.  HERNIA REPAIR  3-    ventral    JOINT REPLACEMENT      KIDNEY REMOVAL Left 08/01/2018    KNEE ARTHROSCOPY Right 7/11/2013    Dr.Robert WaltersAdelina     KNEE ARTHROSCOPY Right 7/11/12    RIGHT KNEE ARTHROSCOPY WITH CHONDROPLASTY    KNEE SURGERY  July 2012    right, arthroscopy    KNEE SURGERY Right 2/18/2020    INCISION AND DRAINAGE RIGHT KNEE AND WOULD VAC PLACEMENT performed by Arti Pickett MD at [address]  2005    OTHER SURGICAL HISTORY Left 03/08/2016    CT biopsy ablasion left kidney    OTHER SURGICAL HISTORY  2016    small intestine 12 inch removed, 2 hernia surgeries, another surgery to drain infection from incision.      REVISION TOTAL KNEE ARTHROPLASTY Right 12/17/2019    RIGHT REVISION TIBIA TOTAL KNEE REPLACEMENT performed by Arti Pickett MD at [facility] Right 1/22/2020    RIGHT KNEE IRRIGATION AND DEBRIDEMENT WITH POLY EXCHANGE performed by Ching Corrigan MD at [facility] Nsaids Nausea Only and Other (See Comments)     Hx of Barretts esophagus      Prochlorperazine Other (See Comments)     No allergic reaction, patient reported sense of \"restlessness\" and fidgiting    Valtrex [Valacyclovir Hcl] Diarrhea    Morphine Rash    Morphine And Related Itching    Tolmetin Nausea Only     Hx of Barretts esophagus     Social History     Occupational History    Occupation: [employer], [employer] his son. Tobacco Use    Smoking status: Former Smoker     Packs/day: 0.50     Years: 25.00     Pack years: 12.50     Types: Cigarettes     Last attempt to quit: 7/31/2017     Years since quitting: 3.0    Smokeless tobacco: Never Used   Substance and Sexual Activity    Alcohol use: No     Alcohol/week: 0.0 standard drinks     Comment: last drink 2016    Drug use: No    Sexual activity: Yes     Partners: Female     Comment: wife Parish Nina       Review of Systems       Objective:     Vitals:    08/07/20 1122   BP: 118/62   Pulse: 67   Resp: 12   Temp: 98.8 °F (37.1 °C)   SpO2: 99%   Weight: 239 lb (108.4 kg)   Height: 6' (1.829 m)     Physical Exam    Predictors of intubation difficulty:   Morbid obesity? no   Anatomically abnormal facies? no   Short, thick neck? no   Neck range of motion: normal   Dentition: edentulous    Cardiographics  ECG: NSR- done yesterday. reviewed tracing. Echocardiogram: not done. Lab Review   Hospital Outpatient Visit on 08/06/2020   Component Date Value    MRSA SCREEN RT-PCR 08/06/2020                      Value:Negative  MRSA DNA not detected.   Normal Range: Not detected      Alb 08/06/2020 4.3     aPTT 08/06/2020 39.4*    Sodium 08/06/2020 143     Potassium 08/06/2020 4.6     Chloride 08/06/2020 104     CO2 08/06/2020 28     Anion Gap 08/06/2020 11     Glucose 08/06/2020 93     BUN 08/06/2020 7     CREATININE 08/06/2020 1.1     GFR Non- 08/06/2020 >60     GFR  08/06/2020 >60     Calcium 08/06/2020 9.2     WBC 08/06/2020 6.4     RBC 08/06/2020 4.75     Hemoglobin 08/06/2020 13.5     Hematocrit 08/06/2020 41.7     MCV 08/06/2020 87.7     MCH 08/06/2020 28.4     MCHC 08/06/2020 32.4     RDW 08/06/2020 15.4     Platelets 53/25/8847 357     MPV 08/06/2020 8.6     Neutrophils % 08/06/2020 55.7     Lymphocytes % 08/06/2020 28.9     Monocytes % 08/06/2020 8.7     Eosinophils % 08/06/2020 4.6     Basophils % 08/06/2020 2.1     Neutrophils Absolute 08/06/2020 3.5     Lymphocytes Absolute 08/06/2020 1.8     Monocytes Absolute 08/06/2020 0.6     Eosinophils Absolute 08/06/2020 0.3     Basophils Absolute 08/06/2020 0.1     Hemoglobin A1C 08/06/2020 5.6     eAG 08/06/2020 114.0     Vit D, 25-Hydroxy 08/06/2020 60.4     Color, UA 08/06/2020 YELLOW     Clarity, UA 08/06/2020 Clear     Glucose, Ur 08/06/2020 Negative     Bilirubin Urine 08/06/2020 Negative     Ketones, Urine 08/06/2020 Negative     Specific Gravity, UA 08/06/2020 1.006     Blood, Urine 08/06/2020 Negative     pH, UA 08/06/2020 6.0     Protein, UA 08/06/2020 Negative     Urobilinogen, Urine 08/06/2020 1.0     Nitrite, Urine 08/06/2020 Negative     Leukocyte Esterase, Urine 08/06/2020 TRACE*    Microscopic Examination 08/06/2020 YES     Urine Type 08/06/2020 NotGiven     Urine Reflex to Culture 08/06/2020 Not Indicated     Sed Rate 08/06/2020 5     Protime 08/06/2020 13.0     INR 08/06/2020 1.12     Prealbumin 08/06/2020 21.2     ABO/Rh 08/06/2020 O POS     Antibody Screen 08/06/2020 NEG     Ventricular Rate 08/06/2020 67     Atrial Rate 08/06/2020 67     P-R Interval 08/06/2020 180     QRS Duration 08/06/2020 84     Q-T Interval 08/06/2020 380     QTc Calculation (Bazett) 08/06/2020 401     P Axis 08/06/2020 68     R Axis 08/06/2020 35     T Axis 08/06/2020 40     Diagnosis 08/06/2020 Normal sinus rhythmNormal ECGWhen compared with ECG of 22-JUN-2020 21:19,No significant change was foundConfirmed by Jasmyne Liao MD, [address] ([id number]) on 8/6/2020 11:35:59 AM     Hyaline Casts, UA 08/06/2020 0     WBC, UA 08/06/2020 1     RBC, UA 08/06/2020 0     Epithelial Cells, UA 08/06/2020 0    Office Visit on 08/06/2020   Component Date Value    SARS-CoV-2, ANNE 08/06/2020 NOT DETECTED    Admission on 06/22/2020, Discharged on 06/23/2020   Component Date Value    WBC 06/22/2020 8.5     RBC 06/22/2020 4.32     Hemoglobin 06/22/2020 12.5*    Hematocrit 06/22/2020 37.7*    MCV 06/22/2020 87.3     MCH 06/22/2020 29.0     MCHC 06/22/2020 33.2     RDW 06/22/2020 17.6*    Platelets 29/55/6637 328     MPV 06/22/2020 8.7     Neutrophils % 06/22/2020 49.6     Lymphocytes % 06/22/2020 37.9     Monocytes % 06/22/2020 7.7     Eosinophils % 06/22/2020 3.2     Basophils % 06/22/2020 1.6     Neutrophils Absolute 06/22/2020 4.2     Lymphocytes Absolute 06/22/2020 3.2     Monocytes Absolute 06/22/2020 0.7     Eosinophils Absolute 06/22/2020 0.3     Basophils Absolute 06/22/2020 0.1     Sodium 06/22/2020 142     Potassium reflex Magnesi* 06/22/2020 4.1     Chloride 06/22/2020 105     CO2 06/22/2020 24     Anion Gap 06/22/2020 13     Glucose 06/22/2020 88     BUN 06/22/2020 9     CREATININE 06/22/2020 1.1     GFR Non- 06/22/2020 >60     GFR  06/22/2020 >60     Calcium 06/22/2020 8.8     Total Protein 06/22/2020 6.2*    Alb 06/22/2020 4.0     Albumin/Globulin Ratio 06/22/2020 1.8     Total Bilirubin 06/22/2020 0.3     Alkaline Phosphatase 06/22/2020 119     ALT 06/22/2020 10     AST 06/22/2020 13*    Globulin 06/22/2020 2.2     Lipase 06/22/2020 33.0     Ventricular Rate 06/22/2020 66     Atrial Rate 06/22/2020 66     P-R Interval 06/22/2020 162     QRS Duration 06/22/2020 96     Q-T Interval 06/22/2020 392     QTc Calculation (Bazett) 06/22/2020 410     P Axis 06/22/2020 27     R Axis 06/22/2020 33     T Axis 06/22/2020 37     Diagnosis 06/22/2020 Normal sinus rhythmCannot rule out Anterior infarct , age undeterminedOtherwise normal ECGWhen compared with ECG of 11-APR-2020 12:10,No significant change was foundConfirmed by Melida Myers MD, [facility] ([id number]) on 6/23/2020 9:46:16 AM           Assessment:     53 y.o. y.o. male with planned surgery as above. Difficulty with intubation is not anticipated. 1. Preop examination  - cleared for surgery. COVID-19 test neg. MRSA test neg. 2. Failed total knee, right, initial encounter [facility])  - agree with surgery to fix this. 3. History of CVA with residual deficit  - hold asa a week prior to surgery and resume after. May continue lipitor    4. Obstructive sleep apnea,Severe -(on cpap)  - take cpap with him to surgery center    5. History of alcoholism ([facility]  - continues alcohol abstinence. 6. Malignant neoplasm of left kidney (HCC) clear cell. 8/1/18 Dr Shawna Sanchez. - no sign of recurrence    7. History of tobacco useadvised to quit- quit 7/1/2014  - remains tobacco free,  Encouraged to stop vaping. 8. Psychophysiological insomnia  - OK to continue sleep meds         Cardiac Risk Estimation: per the Revised Cardiac Risk Index (Circ. 100:1043, 1999), the patient's risk factors for cardiaccomplications include none, putting him/her in: RCI RISK CLASS I (0 risk factors, risk of major cardiac compl. appr. 0.5%)     : Ok to proceed with the procedure. Low cardiac risk. Pt.advised to stop all Rx except Protonix the a.m. of surgery. Patient advised to hold blood thinning medication 7 days prior to procedure, including asa. Prophylaxis for cardiac events with perioperative beta-blockers: not indicated    Deep vein thrombosis prophylaxis postoperatively: regimen to be chosen by surgical team if applicable. Sofia Llamas M.D. [address]  [address] [place] [address]  Phone [phone]  Fax      [phone]

## 2020-08-07 NOTE — PROGRESS NOTES
PRE-OP INSTRUCTIONS     · Do not eat or drink anything after 12:00 midnight prior to surgery. · This includes water, chewing gum, mints and ice chips. · You may brush your teeth and gargle the morning of surgery but DO  NOT SWALLOW THE WATER. Take the following medications with a small sip of water on the morning of procedure. .. Follow your MD/Surgeons pre-procedure instructions regarding your medications    · You may be asked to stop blood thinners such as:  Coumadin, Plavix, Fragmin and lovenox. Please check with your doctor before stopping these or any other medications. · Aspirin, ibuprofen, advil and naproxen, any anti-inflammatory products should be stopped for a week prior to your surgery. · Do not smoke and do not drink any alcoholic beverages 24 hours prior to your surgery. · Please do not wear any jewelry or body piercings on the day of surgery. · Please wear something simple, loose fitting clothing to the hospital.  Do not wear any make-up(including eye make-up) or nail polish on your fingers and toes. As part of our patient safety program to minimize surgical infections, we ask you to do the following:    Please notify your surgeon if you develop any illness between now and the day of your surgery. This includes a cough, cold, fever, sore  throat, nausea, vomiting, diarrhea, etc.   Please notify your surgeon if you experience dizziness, shortness of  breath or blurred vision between now and the time of your surgery. Please notify your surgeon of any open or redden areas that may look infected       DO NOT shave your operative site 96 hours(four days) prior to surgery. Shower the week before surgery with an antibacterial soap such as:dial,safeguard, etc.       Three(3) days prior to your surgery, cleanse the operative site with Hibiclens(anti-microbial soap).  This soap may dry your skin, please do not apply any oils or lotions     · Please bring your insurance card and picture ID day of surgery    · If you have a living will or durable power of attorny. Please bring in a copy of you advanced directives. · If you have dentures, they will be removed before going to the OR, we will provide you with a container. If you wear contact lenses/glasses, they will be removes, please bring a case    · Have you seen your family doctor for a pre-op history and physical.      · Surgery scheduler will call you 48 hours prior to your surgery to notify you of the time of your surgery and the time you will need to be at hospital...patients are asked to arrive 2 1/2 hours prior to surgery. ·  Please call Pre-Admission testing if you have any further questions.                   [id number] [facility] testing phone number:  [phone]      Thank You for choosing [facility]!!

## 2020-08-11 ENCOUNTER — ANESTHESIA EVENT (OUTPATIENT)
Dept: OPERATING ROOM | Age: 50
DRG: 468 | End: 2020-08-11
Payer: COMMERCIAL

## 2020-08-11 ENCOUNTER — PREP FOR PROCEDURE (OUTPATIENT)
Dept: ORTHOPEDICS UNIT | Age: 50
End: 2020-08-11

## 2020-08-11 RX ORDER — OXYCODONE HYDROCHLORIDE 5 MG/1
10 TABLET ORAL ONCE
Status: CANCELLED | OUTPATIENT
Start: 2020-08-11 | End: 2020-08-11

## 2020-08-12 ENCOUNTER — APPOINTMENT (OUTPATIENT)
Dept: GENERAL RADIOLOGY | Age: 50
DRG: 468 | End: 2020-08-12
Attending: ORTHOPAEDIC SURGERY
Payer: COMMERCIAL

## 2020-08-12 ENCOUNTER — HOSPITAL ENCOUNTER (INPATIENT)
Age: 50
LOS: 2 days | Discharge: HOME | DRG: 468 | End: 2020-08-14
Attending: ORTHOPAEDIC SURGERY | Admitting: ORTHOPAEDIC SURGERY
Payer: COMMERCIAL

## 2020-08-12 ENCOUNTER — ANESTHESIA (OUTPATIENT)
Dept: OPERATING ROOM | Age: 50
DRG: 468 | End: 2020-08-12
Payer: COMMERCIAL

## 2020-08-12 VITALS
TEMPERATURE: 98.4 F | RESPIRATION RATE: 13 BRPM | DIASTOLIC BLOOD PRESSURE: 52 MMHG | SYSTOLIC BLOOD PRESSURE: 97 MMHG | OXYGEN SATURATION: 99 %

## 2020-08-12 PROBLEM — T84.53XA INFECTION OF PROSTHETIC RIGHT KNEE JOINT (HCC): Status: ACTIVE | Noted: 2020-08-12

## 2020-08-12 LAB
ABO/RH: NORMAL
ANTIBODY SCREEN: NORMAL
APPEARANCE FLUID: NORMAL
CELL COUNT FLUID TYPE: NORMAL
CLOT EVALUATION: NORMAL
COLOR FLUID: YELLOW
GLUCOSE BLD-MCNC: 106 MG/DL (ref 70–99)
GLUCOSE BLD-MCNC: 154 MG/DL (ref 70–99)
LYMPHOCYTES, BODY FLUID: 2 %
MONOCYTE, FLUID: 5 %
NEUTROPHIL, FLUID: 93 %
NUCLEATED CELLS FLUID: NORMAL /CUMM
NUMBER OF CELLS COUNTED FLUID: 100
PERFORMED ON: ABNORMAL
PERFORMED ON: ABNORMAL
RBC FLUID: 441 /CUMM
VOLUME: 5 ML

## 2020-08-12 PROCEDURE — 87206 SMEAR FLUORESCENT/ACID STAI: CPT

## 2020-08-12 PROCEDURE — 7100000001 HC PACU RECOVERY - ADDTL 15 MIN: Performed by: ORTHOPAEDIC SURGERY

## 2020-08-12 PROCEDURE — 87116 MYCOBACTERIA CULTURE: CPT

## 2020-08-12 PROCEDURE — 86900 BLOOD TYPING SEROLOGIC ABO: CPT

## 2020-08-12 PROCEDURE — 2580000003 HC RX 258: Performed by: ORTHOPAEDIC SURGERY

## 2020-08-12 PROCEDURE — 73560 X-RAY EXAM OF KNEE 1 OR 2: CPT

## 2020-08-12 PROCEDURE — 6360000002 HC RX W HCPCS: Performed by: NURSE ANESTHETIST, CERTIFIED REGISTERED

## 2020-08-12 PROCEDURE — 6370000000 HC RX 637 (ALT 250 FOR IP): Performed by: ORTHOPAEDIC SURGERY

## 2020-08-12 PROCEDURE — 0SPC0JZ REMOVAL OF SYNTHETIC SUBSTITUTE FROM RIGHT KNEE JOINT, OPEN APPROACH: ICD-10-PCS | Performed by: ORTHOPAEDIC SURGERY

## 2020-08-12 PROCEDURE — 2580000003 HC RX 258: Performed by: ANESTHESIOLOGY

## 2020-08-12 PROCEDURE — 3600000015 HC SURGERY LEVEL 5 ADDTL 15MIN: Performed by: ORTHOPAEDIC SURGERY

## 2020-08-12 PROCEDURE — 7100000000 HC PACU RECOVERY - FIRST 15 MIN: Performed by: ORTHOPAEDIC SURGERY

## 2020-08-12 PROCEDURE — 94150 VITAL CAPACITY TEST: CPT

## 2020-08-12 PROCEDURE — 2500000003 HC RX 250 WO HCPCS: Performed by: NURSE ANESTHETIST, CERTIFIED REGISTERED

## 2020-08-12 PROCEDURE — 2060000000 HC ICU INTERMEDIATE R&B

## 2020-08-12 PROCEDURE — L1830 KO IMMOB CANVAS LONG PRE OTS: HCPCS | Performed by: ORTHOPAEDIC SURGERY

## 2020-08-12 PROCEDURE — 6360000002 HC RX W HCPCS: Performed by: ORTHOPAEDIC SURGERY

## 2020-08-12 PROCEDURE — 2709999900 HC NON-CHARGEABLE SUPPLY: Performed by: ORTHOPAEDIC SURGERY

## 2020-08-12 PROCEDURE — 89051 BODY FLUID CELL COUNT: CPT

## 2020-08-12 PROCEDURE — 0SRC0EZ REPLACEMENT OF RIGHT KNEE JOINT WITH ARTICULATING SPACER, OPEN APPROACH: ICD-10-PCS | Performed by: ORTHOPAEDIC SURGERY

## 2020-08-12 PROCEDURE — C1713 ANCHOR/SCREW BN/BN,TIS/BN: HCPCS | Performed by: ORTHOPAEDIC SURGERY

## 2020-08-12 PROCEDURE — 87205 SMEAR GRAM STAIN: CPT

## 2020-08-12 PROCEDURE — C1776 JOINT DEVICE (IMPLANTABLE): HCPCS | Performed by: ORTHOPAEDIC SURGERY

## 2020-08-12 PROCEDURE — 6370000000 HC RX 637 (ALT 250 FOR IP): Performed by: NURSE ANESTHETIST, CERTIFIED REGISTERED

## 2020-08-12 PROCEDURE — 99255 IP/OBS CONSLTJ NEW/EST HI 80: CPT | Performed by: INTERNAL MEDICINE

## 2020-08-12 PROCEDURE — 87075 CULTR BACTERIA EXCEPT BLOOD: CPT

## 2020-08-12 PROCEDURE — 3600000005 HC SURGERY LEVEL 5 BASE: Performed by: ORTHOPAEDIC SURGERY

## 2020-08-12 PROCEDURE — 86901 BLOOD TYPING SEROLOGIC RH(D): CPT

## 2020-08-12 PROCEDURE — 87102 FUNGUS ISOLATION CULTURE: CPT

## 2020-08-12 PROCEDURE — 3700000000 HC ANESTHESIA ATTENDED CARE: Performed by: ORTHOPAEDIC SURGERY

## 2020-08-12 PROCEDURE — 2720000010 HC SURG SUPPLY STERILE: Performed by: ORTHOPAEDIC SURGERY

## 2020-08-12 PROCEDURE — 87015 SPECIMEN INFECT AGNT CONCNTJ: CPT

## 2020-08-12 PROCEDURE — 88300 SURGICAL PATH GROSS: CPT

## 2020-08-12 PROCEDURE — 6360000002 HC RX W HCPCS: Performed by: ANESTHESIOLOGY

## 2020-08-12 PROCEDURE — 2500000003 HC RX 250 WO HCPCS: Performed by: ORTHOPAEDIC SURGERY

## 2020-08-12 PROCEDURE — 87070 CULTURE OTHR SPECIMN AEROBIC: CPT

## 2020-08-12 PROCEDURE — 3700000001 HC ADD 15 MINUTES (ANESTHESIA): Performed by: ORTHOPAEDIC SURGERY

## 2020-08-12 PROCEDURE — 86850 RBC ANTIBODY SCREEN: CPT

## 2020-08-12 DEVICE — CEMENT BNE 40GM W/ GENT HI VISC RADPQ FOR REV SURG
Type: IMPLANTABLE DEVICE | Site: KNEE | Status: NON-FUNCTIONAL
Removed: 2021-02-16

## 2020-08-12 DEVICE — IMPLANTABLE DEVICE
Type: IMPLANTABLE DEVICE | Site: KNEE | Status: NON-FUNCTIONAL
Removed: 2021-02-16

## 2020-08-12 RX ORDER — MEPERIDINE HYDROCHLORIDE 25 MG/ML
12.5 INJECTION INTRAMUSCULAR; INTRAVENOUS; SUBCUTANEOUS
Status: DISCONTINUED | OUTPATIENT
Start: 2020-08-12 | End: 2020-08-12 | Stop reason: HOSPADM

## 2020-08-12 RX ORDER — PANTOPRAZOLE SODIUM 40 MG/1
40 TABLET, DELAYED RELEASE ORAL 2 TIMES DAILY
Status: DISCONTINUED | OUTPATIENT
Start: 2020-08-12 | End: 2020-08-14 | Stop reason: HOSPADM

## 2020-08-12 RX ORDER — DEXTROSE MONOHYDRATE 50 MG/ML
100 INJECTION, SOLUTION INTRAVENOUS PRN
Status: DISCONTINUED | OUTPATIENT
Start: 2020-08-12 | End: 2020-08-13

## 2020-08-12 RX ORDER — TRAZODONE HYDROCHLORIDE 100 MG/1
200 TABLET ORAL NIGHTLY
Status: DISCONTINUED | OUTPATIENT
Start: 2020-08-12 | End: 2020-08-14 | Stop reason: HOSPADM

## 2020-08-12 RX ORDER — LIDOCAINE HYDROCHLORIDE 10 MG/ML
5 INJECTION, SOLUTION EPIDURAL; INFILTRATION; INTRACAUDAL; PERINEURAL ONCE
Status: DISCONTINUED | OUTPATIENT
Start: 2020-08-12 | End: 2020-08-14 | Stop reason: HOSPADM

## 2020-08-12 RX ORDER — MULTIVITAMIN WITH IRON
1 TABLET ORAL DAILY
Status: DISCONTINUED | OUTPATIENT
Start: 2020-08-12 | End: 2020-08-14 | Stop reason: HOSPADM

## 2020-08-12 RX ORDER — ONDANSETRON 2 MG/ML
INJECTION INTRAMUSCULAR; INTRAVENOUS PRN
Status: DISCONTINUED | OUTPATIENT
Start: 2020-08-12 | End: 2020-08-12 | Stop reason: SDUPTHER

## 2020-08-12 RX ORDER — SENNA AND DOCUSATE SODIUM 50; 8.6 MG/1; MG/1
1 TABLET, FILM COATED ORAL 2 TIMES DAILY
Status: DISCONTINUED | OUTPATIENT
Start: 2020-08-12 | End: 2020-08-14 | Stop reason: HOSPADM

## 2020-08-12 RX ORDER — HYDRALAZINE HYDROCHLORIDE 20 MG/ML
5 INJECTION INTRAMUSCULAR; INTRAVENOUS EVERY 10 MIN PRN
Status: DISCONTINUED | OUTPATIENT
Start: 2020-08-12 | End: 2020-08-12 | Stop reason: HOSPADM

## 2020-08-12 RX ORDER — SUCCINYLCHOLINE/SOD CL,ISO/PF 200MG/10ML
SYRINGE (ML) INTRAVENOUS PRN
Status: DISCONTINUED | OUTPATIENT
Start: 2020-08-12 | End: 2020-08-12 | Stop reason: SDUPTHER

## 2020-08-12 RX ORDER — PROPOFOL 10 MG/ML
INJECTION, EMULSION INTRAVENOUS PRN
Status: DISCONTINUED | OUTPATIENT
Start: 2020-08-12 | End: 2020-08-12 | Stop reason: SDUPTHER

## 2020-08-12 RX ORDER — SODIUM CHLORIDE 0.9 % (FLUSH) 0.9 %
10 SYRINGE (ML) INJECTION EVERY 12 HOURS SCHEDULED
Status: DISCONTINUED | OUTPATIENT
Start: 2020-08-12 | End: 2020-08-12 | Stop reason: SDUPTHER

## 2020-08-12 RX ORDER — SODIUM CHLORIDE 0.9 % (FLUSH) 0.9 %
10 SYRINGE (ML) INJECTION EVERY 12 HOURS SCHEDULED
Status: DISCONTINUED | OUTPATIENT
Start: 2020-08-12 | End: 2020-08-14 | Stop reason: HOSPADM

## 2020-08-12 RX ORDER — VANCOMYCIN HYDROCHLORIDE 1 G/20ML
INJECTION, POWDER, LYOPHILIZED, FOR SOLUTION INTRAVENOUS
Status: COMPLETED | OUTPATIENT
Start: 2020-08-12 | End: 2020-08-12

## 2020-08-12 RX ORDER — ONDANSETRON 2 MG/ML
4 INJECTION INTRAMUSCULAR; INTRAVENOUS EVERY 6 HOURS PRN
Status: DISCONTINUED | OUTPATIENT
Start: 2020-08-12 | End: 2020-08-14 | Stop reason: HOSPADM

## 2020-08-12 RX ORDER — OXYCODONE HYDROCHLORIDE 5 MG/1
5-10 TABLET ORAL EVERY 6 HOURS PRN
Qty: 40 TABLET | Refills: 0 | Status: SHIPPED | OUTPATIENT
Start: 2020-08-12 | End: 2020-08-19 | Stop reason: SDUPTHER

## 2020-08-12 RX ORDER — SODIUM CHLORIDE 450 MG/100ML
INJECTION, SOLUTION INTRAVENOUS CONTINUOUS
Status: DISCONTINUED | OUTPATIENT
Start: 2020-08-12 | End: 2020-08-13

## 2020-08-12 RX ORDER — ZOLPIDEM TARTRATE 5 MG/1
10 TABLET ORAL NIGHTLY PRN
Status: DISCONTINUED | OUTPATIENT
Start: 2020-08-12 | End: 2020-08-14 | Stop reason: HOSPADM

## 2020-08-12 RX ORDER — ACETAMINOPHEN 500 MG
TABLET ORAL
Status: COMPLETED
Start: 2020-08-12 | End: 2020-08-12

## 2020-08-12 RX ORDER — SODIUM CHLORIDE 9 MG/ML
INJECTION, SOLUTION INTRAVENOUS CONTINUOUS
Status: DISCONTINUED | OUTPATIENT
Start: 2020-08-12 | End: 2020-08-12

## 2020-08-12 RX ORDER — OXYCODONE HYDROCHLORIDE 5 MG/1
10 TABLET ORAL ONCE
Status: COMPLETED | OUTPATIENT
Start: 2020-08-12 | End: 2020-08-12

## 2020-08-12 RX ORDER — OXYCODONE HYDROCHLORIDE 10 MG/1
10 TABLET ORAL EVERY 4 HOURS PRN
Status: DISCONTINUED | OUTPATIENT
Start: 2020-08-12 | End: 2020-08-14 | Stop reason: HOSPADM

## 2020-08-12 RX ORDER — NICOTINE POLACRILEX 4 MG
15 LOZENGE BUCCAL PRN
Status: DISCONTINUED | OUTPATIENT
Start: 2020-08-12 | End: 2020-08-13

## 2020-08-12 RX ORDER — LIDOCAINE HYDROCHLORIDE 20 MG/ML
INJECTION, SOLUTION EPIDURAL; INFILTRATION; INTRACAUDAL; PERINEURAL PRN
Status: DISCONTINUED | OUTPATIENT
Start: 2020-08-12 | End: 2020-08-12 | Stop reason: SDUPTHER

## 2020-08-12 RX ORDER — DICYCLOMINE HYDROCHLORIDE 10 MG/1
10 CAPSULE ORAL EVERY 6 HOURS PRN
Status: DISCONTINUED | OUTPATIENT
Start: 2020-08-12 | End: 2020-08-14 | Stop reason: HOSPADM

## 2020-08-12 RX ORDER — OXYCODONE HYDROCHLORIDE 5 MG/1
5 TABLET ORAL EVERY 4 HOURS PRN
Status: DISCONTINUED | OUTPATIENT
Start: 2020-08-12 | End: 2020-08-14 | Stop reason: HOSPADM

## 2020-08-12 RX ORDER — ONDANSETRON 2 MG/ML
4 INJECTION INTRAMUSCULAR; INTRAVENOUS
Status: DISCONTINUED | OUTPATIENT
Start: 2020-08-12 | End: 2020-08-12 | Stop reason: HOSPADM

## 2020-08-12 RX ORDER — HYDROCODONE BITARTRATE AND ACETAMINOPHEN 5; 325 MG/1; MG/1
1 TABLET ORAL PRN
Status: DISCONTINUED | OUTPATIENT
Start: 2020-08-12 | End: 2020-08-12 | Stop reason: HOSPADM

## 2020-08-12 RX ORDER — ROCURONIUM BROMIDE 10 MG/ML
INJECTION, SOLUTION INTRAVENOUS PRN
Status: DISCONTINUED | OUTPATIENT
Start: 2020-08-12 | End: 2020-08-12 | Stop reason: SDUPTHER

## 2020-08-12 RX ORDER — FENTANYL CITRATE 50 UG/ML
INJECTION, SOLUTION INTRAMUSCULAR; INTRAVENOUS PRN
Status: DISCONTINUED | OUTPATIENT
Start: 2020-08-12 | End: 2020-08-12 | Stop reason: SDUPTHER

## 2020-08-12 RX ORDER — SODIUM CHLORIDE 0.9 % (FLUSH) 0.9 %
10 SYRINGE (ML) INJECTION PRN
Status: DISCONTINUED | OUTPATIENT
Start: 2020-08-12 | End: 2020-08-12 | Stop reason: HOSPADM

## 2020-08-12 RX ORDER — HYDROCODONE BITARTRATE AND ACETAMINOPHEN 5; 325 MG/1; MG/1
2 TABLET ORAL PRN
Status: DISCONTINUED | OUTPATIENT
Start: 2020-08-12 | End: 2020-08-12 | Stop reason: HOSPADM

## 2020-08-12 RX ORDER — DEXAMETHASONE SODIUM PHOSPHATE 4 MG/ML
INJECTION, SOLUTION INTRA-ARTICULAR; INTRALESIONAL; INTRAMUSCULAR; INTRAVENOUS; SOFT TISSUE PRN
Status: DISCONTINUED | OUTPATIENT
Start: 2020-08-12 | End: 2020-08-12 | Stop reason: SDUPTHER

## 2020-08-12 RX ORDER — ACETAMINOPHEN 500 MG
1000 TABLET ORAL
Status: DISCONTINUED | OUTPATIENT
Start: 2020-08-12 | End: 2020-08-14 | Stop reason: HOSPADM

## 2020-08-12 RX ORDER — ACETAMINOPHEN 500 MG
TABLET ORAL PRN
Status: DISCONTINUED | OUTPATIENT
Start: 2020-08-12 | End: 2020-08-12 | Stop reason: SDUPTHER

## 2020-08-12 RX ORDER — ATORVASTATIN CALCIUM 40 MG/1
40 TABLET, FILM COATED ORAL DAILY
Status: DISCONTINUED | OUTPATIENT
Start: 2020-08-12 | End: 2020-08-14

## 2020-08-12 RX ORDER — ACETAMINOPHEN 325 MG/1
650 TABLET ORAL EVERY 4 HOURS PRN
Status: DISCONTINUED | OUTPATIENT
Start: 2020-08-12 | End: 2020-08-14 | Stop reason: HOSPADM

## 2020-08-12 RX ORDER — MORPHINE SULFATE 4 MG/ML
4 INJECTION, SOLUTION INTRAMUSCULAR; INTRAVENOUS
Status: DISCONTINUED | OUTPATIENT
Start: 2020-08-12 | End: 2020-08-14 | Stop reason: HOSPADM

## 2020-08-12 RX ORDER — ASPIRIN 81 MG/1
81 TABLET, CHEWABLE ORAL DAILY
Status: DISCONTINUED | OUTPATIENT
Start: 2020-08-12 | End: 2020-08-14 | Stop reason: HOSPADM

## 2020-08-12 RX ORDER — SODIUM CHLORIDE 0.9 % (FLUSH) 0.9 %
10 SYRINGE (ML) INJECTION PRN
Status: DISCONTINUED | OUTPATIENT
Start: 2020-08-12 | End: 2020-08-14 | Stop reason: HOSPADM

## 2020-08-12 RX ORDER — MIDAZOLAM HYDROCHLORIDE 1 MG/ML
INJECTION INTRAMUSCULAR; INTRAVENOUS PRN
Status: DISCONTINUED | OUTPATIENT
Start: 2020-08-12 | End: 2020-08-12 | Stop reason: SDUPTHER

## 2020-08-12 RX ORDER — FENTANYL CITRATE 50 UG/ML
25 INJECTION, SOLUTION INTRAMUSCULAR; INTRAVENOUS EVERY 5 MIN PRN
Status: DISCONTINUED | OUTPATIENT
Start: 2020-08-12 | End: 2020-08-12 | Stop reason: HOSPADM

## 2020-08-12 RX ORDER — SODIUM CHLORIDE 0.9 % (FLUSH) 0.9 %
10 SYRINGE (ML) INJECTION PRN
Status: DISCONTINUED | OUTPATIENT
Start: 2020-08-12 | End: 2020-08-12 | Stop reason: SDUPTHER

## 2020-08-12 RX ORDER — FENTANYL CITRATE 50 UG/ML
50 INJECTION, SOLUTION INTRAMUSCULAR; INTRAVENOUS EVERY 5 MIN PRN
Status: DISCONTINUED | OUTPATIENT
Start: 2020-08-12 | End: 2020-08-12 | Stop reason: HOSPADM

## 2020-08-12 RX ORDER — FLUOXETINE HYDROCHLORIDE 20 MG/1
20 CAPSULE ORAL DAILY
Status: DISCONTINUED | OUTPATIENT
Start: 2020-08-12 | End: 2020-08-14 | Stop reason: HOSPADM

## 2020-08-12 RX ORDER — PROMETHAZINE HYDROCHLORIDE 25 MG/ML
6.25 INJECTION, SOLUTION INTRAMUSCULAR; INTRAVENOUS
Status: DISCONTINUED | OUTPATIENT
Start: 2020-08-12 | End: 2020-08-12 | Stop reason: HOSPADM

## 2020-08-12 RX ORDER — INSULIN GLARGINE 100 [IU]/ML
0.25 INJECTION, SOLUTION SUBCUTANEOUS NIGHTLY
Status: DISCONTINUED | OUTPATIENT
Start: 2020-08-12 | End: 2020-08-12

## 2020-08-12 RX ORDER — DEXTROSE MONOHYDRATE 25 G/50ML
12.5 INJECTION, SOLUTION INTRAVENOUS PRN
Status: DISCONTINUED | OUTPATIENT
Start: 2020-08-12 | End: 2020-08-13

## 2020-08-12 RX ORDER — SODIUM CHLORIDE 0.9 % (FLUSH) 0.9 %
10 SYRINGE (ML) INJECTION EVERY 12 HOURS SCHEDULED
Status: DISCONTINUED | OUTPATIENT
Start: 2020-08-12 | End: 2020-08-12 | Stop reason: HOSPADM

## 2020-08-12 RX ADMIN — MIDAZOLAM 2 MG: 1 INJECTION INTRAMUSCULAR; INTRAVENOUS at 13:16

## 2020-08-12 RX ADMIN — MORPHINE SULFATE 4 MG: 4 INJECTION, SOLUTION INTRAMUSCULAR; INTRAVENOUS at 18:30

## 2020-08-12 RX ADMIN — FENTANYL CITRATE 50 MCG: 50 INJECTION INTRAMUSCULAR; INTRAVENOUS at 13:32

## 2020-08-12 RX ADMIN — OYSTER SHELL CALCIUM WITH VITAMIN D 1 TABLET: 500; 200 TABLET, FILM COATED ORAL at 20:52

## 2020-08-12 RX ADMIN — OXYCODONE HYDROCHLORIDE 10 MG: 10 TABLET ORAL at 21:40

## 2020-08-12 RX ADMIN — ACETAMINOPHEN 1000 MG: 500 TABLET, FILM COATED ORAL at 13:04

## 2020-08-12 RX ADMIN — DOCUSATE SODIUM 50 MG AND SENNOSIDES 8.6 MG 1 TABLET: 8.6; 5 TABLET, FILM COATED ORAL at 20:52

## 2020-08-12 RX ADMIN — TRAZODONE HYDROCHLORIDE 200 MG: 100 TABLET ORAL at 20:52

## 2020-08-12 RX ADMIN — CEFAZOLIN 2 G: 10 INJECTION, POWDER, FOR SOLUTION INTRAVENOUS at 13:16

## 2020-08-12 RX ADMIN — OXYCODONE 10 MG: 5 TABLET ORAL at 11:06

## 2020-08-12 RX ADMIN — SODIUM CHLORIDE: 4.5 INJECTION, SOLUTION INTRAVENOUS at 17:36

## 2020-08-12 RX ADMIN — ZOLPIDEM TARTRATE 10 MG: 5 TABLET ORAL at 21:40

## 2020-08-12 RX ADMIN — ROCURONIUM BROMIDE 45 MG: 10 INJECTION INTRAVENOUS at 13:26

## 2020-08-12 RX ADMIN — INSULIN LISPRO 1 UNITS: 100 INJECTION, SOLUTION INTRAVENOUS; SUBCUTANEOUS at 20:53

## 2020-08-12 RX ADMIN — DEXAMETHASONE SODIUM PHOSPHATE 10 MG: 4 INJECTION, SOLUTION INTRAMUSCULAR; INTRAVENOUS at 13:39

## 2020-08-12 RX ADMIN — ROCURONIUM BROMIDE 5 MG: 10 INJECTION INTRAVENOUS at 13:22

## 2020-08-12 RX ADMIN — CEFAZOLIN 2 G: 10 INJECTION, POWDER, FOR SOLUTION INTRAVENOUS at 21:41

## 2020-08-12 RX ADMIN — FENTANYL CITRATE 50 MCG: 50 INJECTION INTRAMUSCULAR; INTRAVENOUS at 13:22

## 2020-08-12 RX ADMIN — SUGAMMADEX 200 MG: 100 INJECTION, SOLUTION INTRAVENOUS at 14:57

## 2020-08-12 RX ADMIN — PROPOFOL 200 MG: 10 INJECTION, EMULSION INTRAVENOUS at 13:22

## 2020-08-12 RX ADMIN — ATORVASTATIN CALCIUM 40 MG: 40 TABLET, FILM COATED ORAL at 17:35

## 2020-08-12 RX ADMIN — HYDROMORPHONE HYDROCHLORIDE 0.5 MG: 1 INJECTION, SOLUTION INTRAMUSCULAR; INTRAVENOUS; SUBCUTANEOUS at 14:36

## 2020-08-12 RX ADMIN — OXYCODONE HYDROCHLORIDE 10 MG: 10 TABLET ORAL at 17:35

## 2020-08-12 RX ADMIN — FENTANYL CITRATE 50 MCG: 50 INJECTION, SOLUTION INTRAMUSCULAR; INTRAVENOUS at 15:21

## 2020-08-12 RX ADMIN — ACETAMINOPHEN 1000 MG: 500 TABLET, FILM COATED ORAL at 17:35

## 2020-08-12 RX ADMIN — ASPIRIN 81 MG: 81 TABLET, CHEWABLE ORAL at 17:35

## 2020-08-12 RX ADMIN — SODIUM CHLORIDE: 9 INJECTION, SOLUTION INTRAVENOUS at 11:06

## 2020-08-12 RX ADMIN — ONDANSETRON 4 MG: 2 INJECTION INTRAMUSCULAR; INTRAVENOUS at 13:39

## 2020-08-12 RX ADMIN — LIDOCAINE HYDROCHLORIDE 100 MG: 20 INJECTION, SOLUTION EPIDURAL; INFILTRATION; INTRACAUDAL; PERINEURAL at 13:22

## 2020-08-12 RX ADMIN — PANTOPRAZOLE SODIUM 40 MG: 40 TABLET, DELAYED RELEASE ORAL at 20:52

## 2020-08-12 RX ADMIN — THERA TABS 1 TABLET: TAB at 17:35

## 2020-08-12 RX ADMIN — FLUOXETINE 20 MG: 20 CAPSULE ORAL at 17:35

## 2020-08-12 RX ADMIN — HYDROMORPHONE HYDROCHLORIDE 0.5 MG: 1 INJECTION, SOLUTION INTRAMUSCULAR; INTRAVENOUS; SUBCUTANEOUS at 13:53

## 2020-08-12 RX ADMIN — Medication 160 MG: at 13:22

## 2020-08-12 ASSESSMENT — PAIN DESCRIPTION - PAIN TYPE
TYPE: SURGICAL PAIN

## 2020-08-12 ASSESSMENT — PAIN - FUNCTIONAL ASSESSMENT
PAIN_FUNCTIONAL_ASSESSMENT: PREVENTS OR INTERFERES SOME ACTIVE ACTIVITIES AND ADLS
PAIN_FUNCTIONAL_ASSESSMENT: ACTIVITIES ARE NOT PREVENTED
PAIN_FUNCTIONAL_ASSESSMENT: PREVENTS OR INTERFERES SOME ACTIVE ACTIVITIES AND ADLS
PAIN_FUNCTIONAL_ASSESSMENT: PREVENTS OR INTERFERES SOME ACTIVE ACTIVITIES AND ADLS
PAIN_FUNCTIONAL_ASSESSMENT: 0-10

## 2020-08-12 ASSESSMENT — PULMONARY FUNCTION TESTS
PIF_VALUE: 14
PIF_VALUE: 12
PIF_VALUE: 13
PIF_VALUE: 14
PIF_VALUE: 13
PIF_VALUE: 4
PIF_VALUE: 13
PIF_VALUE: 13
PIF_VALUE: 4
PIF_VALUE: 14
PIF_VALUE: 14
PIF_VALUE: 12
PIF_VALUE: 13
PIF_VALUE: 14
PIF_VALUE: 3
PIF_VALUE: 13
PIF_VALUE: 14
PIF_VALUE: 13
PIF_VALUE: 12
PIF_VALUE: 13
PIF_VALUE: 14
PIF_VALUE: 13
PIF_VALUE: 13
PIF_VALUE: 12
PIF_VALUE: 14
PIF_VALUE: 15
PIF_VALUE: 1
PIF_VALUE: 13
PIF_VALUE: 14
PIF_VALUE: 13
PIF_VALUE: 1
PIF_VALUE: 12
PIF_VALUE: 14
PIF_VALUE: 14
PIF_VALUE: 12
PIF_VALUE: 12
PIF_VALUE: 13
PIF_VALUE: 14
PIF_VALUE: 13
PIF_VALUE: 14
PIF_VALUE: 12
PIF_VALUE: 0
PIF_VALUE: 12
PIF_VALUE: 13
PIF_VALUE: 12
PIF_VALUE: 13
PIF_VALUE: 12
PIF_VALUE: 2
PIF_VALUE: 13
PIF_VALUE: 13
PIF_VALUE: 12
PIF_VALUE: 13
PIF_VALUE: 14
PIF_VALUE: 12
PIF_VALUE: 13
PIF_VALUE: 13
PIF_VALUE: 12
PIF_VALUE: 12
PIF_VALUE: 14
PIF_VALUE: 13
PIF_VALUE: 12
PIF_VALUE: 13
PIF_VALUE: 14
PIF_VALUE: 13
PIF_VALUE: 13
PIF_VALUE: 1
PIF_VALUE: 14
PIF_VALUE: 12
PIF_VALUE: 13
PIF_VALUE: 12
PIF_VALUE: 12
PIF_VALUE: 1
PIF_VALUE: 13
PIF_VALUE: 1
PIF_VALUE: 13
PIF_VALUE: 14
PIF_VALUE: 12
PIF_VALUE: 13
PIF_VALUE: 14
PIF_VALUE: 12
PIF_VALUE: 12
PIF_VALUE: 13
PIF_VALUE: 1

## 2020-08-12 ASSESSMENT — PAIN DESCRIPTION - DESCRIPTORS
DESCRIPTORS: ACHING
DESCRIPTORS: THROBBING;SHARP
DESCRIPTORS: SHARP
DESCRIPTORS: ACHING

## 2020-08-12 ASSESSMENT — PAIN DESCRIPTION - LOCATION
LOCATION: KNEE

## 2020-08-12 ASSESSMENT — PAIN DESCRIPTION - ONSET
ONSET: AWAKENED FROM SLEEP
ONSET: ON-GOING

## 2020-08-12 ASSESSMENT — PAIN DESCRIPTION - PROGRESSION
CLINICAL_PROGRESSION: GRADUALLY WORSENING
CLINICAL_PROGRESSION: NOT CHANGED
CLINICAL_PROGRESSION: GRADUALLY IMPROVING

## 2020-08-12 ASSESSMENT — PAIN DESCRIPTION - FREQUENCY
FREQUENCY: CONTINUOUS

## 2020-08-12 ASSESSMENT — PAIN SCALES - WONG BAKER
WONGBAKER_NUMERICALRESPONSE: 0
WONGBAKER_NUMERICALRESPONSE: 0
WONGBAKER_NUMERICALRESPONSE: 2

## 2020-08-12 ASSESSMENT — PAIN SCALES - GENERAL
PAINLEVEL_OUTOF10: 8
PAINLEVEL_OUTOF10: 8
PAINLEVEL_OUTOF10: 4
PAINLEVEL_OUTOF10: 7
PAINLEVEL_OUTOF10: 0
PAINLEVEL_OUTOF10: 8
PAINLEVEL_OUTOF10: 8
PAINLEVEL_OUTOF10: 0

## 2020-08-12 ASSESSMENT — PAIN DESCRIPTION - ORIENTATION
ORIENTATION: RIGHT

## 2020-08-12 NOTE — PLAN OF CARE
reoriented to room and call light, reminded not to get up without assistance, call light in reach, will continue to monitor. Pt verbalizes understanding of fall risk procedures. Goal: Absence of physical injury  Description: Absence of physical injury  Outcome: Ongoing  Note: Pt has not incurred any type of physical injury this shift.

## 2020-08-12 NOTE — H&P
Preoperative H&P Update     The patient's History and Physical in the medical record dated 8/12/20 was reviewed by me today. I reviewed the HPI, medications, allergies, reason for surgery, diagnosis and treatment plan and there has been no change. The patient was evaluated by me today. Prior to Visit Medications    Medication Sig Taking? Authorizing Provider   zolpidem (AMBIEN) 10 MG tablet Take by mouth nightly as needed for Sleep.  Yes Historical Provider, MD   atorvastatin (LIPITOR) 40 MG tablet TAKE 1 TABLET BY MOUTH EVERY NIGHT AT BEDTIME Yes Aracelis Angel MD   dextrose 5 % SOLN 50 mL with ketamine 100 MG/ML SOLN 50 mg Infuse intravenously continuous Indications: 350 mg once every 3 months Yes Historical Provider, MD   aspirin 81 MG tablet Take 81 mg by mouth daily Yes Historical Provider, MD   traZODone (DESYREL) 100 MG tablet Take 2 tablets by mouth nightly Yes Manny Palomares MD   FLUoxetine (PROZAC) 20 MG capsule Take 1 capsule by mouth daily Yes Manny Palomares MD   acetaminophen (TYLENOL) 500 MG tablet Take 2 tablets by mouth 3 times daily (with meals)  Patient taking differently: Take 1,000 mg by mouth 3 times daily as needed  Yes Yancy العلي PA-C   pantoprazole (PROTONIX) 40 MG tablet Take 1 tablet by mouth 2 times daily Yes Aracelis Angel MD   calcium-vitamin D (OSCAL 500/200 D-3) 500-200 MG-UNIT per tablet Take 1 tablet by mouth 2 times daily  Yes Historical Provider, MD   Multiple Vitamin TABS Take 1 tablet by mouth daily  Yes Historical Provider, MD   dicyclomine (BENTYL) 10 MG capsule Take 1 capsule by mouth every 6 hours as needed (cramps)  Teresa Michael PA-C   sildenafil (REVATIO) 20 MG tablet Take 4 tablets by mouth as needed (30 min prior to intercourse)  Aracelis Angel MD       Physical exam findings for this update include:  Vitals:    08/12/20 1052   BP: 123/69   Pulse: 69   Resp: 16   Temp: 98.3 °F (36.8 °C)   SpO2: 97%     Airway is intact Chest: breathing comfortably  Heart: regular rate and rhythm. Examination of the body region where surgery is to be performed shows skin is intact at the operative site. The risk, benefits, and alternatives of the proposed procedure have been explained to the patient (or appropriate guardian) and understanding verbalized. All questions answered. Patient wishes to proceed. The patient was counseled at length about the risks of michelle Covid-19 during their perioperative period and any recovery window from their procedure. The patient was made aware that michelle Covid-19  may worsen their prognosis for recovering from their procedure  and lend to a higher morbidity and/or mortality risk. All material risks, benefits, and reasonable alternatives including postponing the procedure were discussed. The patient does wish to proceed with the procedure at this time.         Victor M Bean MD    Electronically signed by Levi Arnold MD on 8/12/2020 at 11:05 AM

## 2020-08-12 NOTE — ANESTHESIA PRE PROCEDURE
[facility] Department of Anesthesiology  Pre-Anesthesia Evaluation/Consultation       Name:  Juanito Rosado  : 1970  Age:  53 y.o. MRN:  [id number]  Date: 2020           Surgeon: Surgeon(s):  Sakshi Mccollum MD    Procedure: Procedure(s):  RIGHT TOTAL KNEE  REPLACEMENT RESECTION WITH INSERTION OF SPACER    Allergies   Allergen Reactions    Nsaids Nausea Only and Other (See Comments)     Hx of Barretts esophagus      Prochlorperazine Other (See Comments)     No allergic reaction, patient reported sense of \"restlessness\" and fidgiting    Valtrex [Valacyclovir Hcl] Diarrhea    Morphine Rash    Morphine And Related Itching    Tolmetin Nausea Only     Hx of Barretts esophagus     Patient Active Problem List   Diagnosis    Insomnia-chronic intermittent--uses prn ambien--to be filled by dr Daylin Fritz (sleep dr)   Chung Cruz esophagus-on daily ppi-last egd 5/15--dr Castro Del Rio History of tobacco useadvised to quit- quit 2014    Allergic rhinitis, seasonal    Obstructive sleep apnea,Severe -(on cpap)    Class 1 obesity due to excess calories with body mass index (BMI) of 34.0 to 34.9 in adult    Depression-on daily effexor xr--sees dr Mike Nichols    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  Chondromalacia of patellofemoral joint    Chronic pain syndrome    History of stroke    Gastroesophageal reflux disease with esophagitis--on daily ppi    Intractable chronic migraine without aura and with status migrainosus    Iron deficiency anemia    B12 deficiency    Anastomotic ulcer    Malignant neoplasm of left kidney (HCC) clear cell. 18 Dr Naya Quinn.     Total knee replacement status, right    Failed total knee, right, initial encounter ([facility]    Infection of total knee replacement ([address]    History of depression    History of alcoholism ([address]    Receiving intravenous antibiotic treatment as outpatient    SBO (small bowel obstruction) (HCC)    Partial small bowel obstruction (HCC)     Past Medical History:   Diagnosis Date    Alcoholism ([address]     last drink 2015    Allergic migraine with status migrainosus     Allergic rhinitis, seasonal     Anemia     Arthritis     right knee    Vaughn's esophagus     Benign intracranial hypertension     Cancer (HCC)     renal     Carpal tunnel syndrome     Chronic pain     Depression     Depression     GERD (gastroesophageal reflux disease)     Headache(784.0)     History of blood transfusion     History of tobacco use     Quit 7/2014    Migraine     chronic for 1 year    Morbid obesity ([address]     Movement disorder     Onychomycosis     Sleep apnea, obstructive     Severe uses cpap stop bang 6    Unspecified cerebral artery occlusion with cerebral infarction 2014    slight weakness in left arm    Wears dentures     full set    Wears glasses      Past Surgical History:   Procedure Laterality Date    ABDOMEN SURGERY      gastric bypass    ABDOMEN SURGERY  4-7-2016    repair of recurrent incisional hernia with mesh, removal of old mesh, bilateral component separation    ABDOMINAL EXPLORATION SURGERY  1/11/16    exp lap, lysis of adhesions, small bowel resection    CARPAL TUNNEL RELEASE      bilat    DILATATION, ESOPHAGUS      ENDOSCOPY, COLON, DIAGNOSTIC      EYE SURGERY      GASTRIC BYPASS SURGERY  Jan 2009    Has lost about 200 pounds.     HERNIA REPAIR  3-    ventral    JOINT REPLACEMENT      KIDNEY REMOVAL Left 08/01/2018    KNEE ARTHROSCOPY Right 7/11/2013    Dr.Robert Sanders     KNEE ARTHROSCOPY Right 7/11/12    RIGHT KNEE ARTHROSCOPY WITH CHONDROPLASTY    KNEE SURGERY  July 2012    right, arthroscopy    KNEE SURGERY Right 2/18/2020    INCISION AND DRAINAGE RIGHT KNEE AND WOULD VAC PLACEMENT performed by Lisa Ramon MD at [address]  2005    OTHER SURGICAL HISTORY Left 03/08/2016 CT biopsy ablasion left kidney    OTHER SURGICAL HISTORY  2016    small intestine 12 inch removed, 2 hernia surgeries, another surgery to drain infection from incision.  REVISION TOTAL KNEE ARTHROPLASTY Right 12/17/2019    RIGHT REVISION TIBIA TOTAL KNEE REPLACEMENT performed by Jay Franco MD at [address] [facility] Right 1/22/2020    RIGHT KNEE IRRIGATION AND DEBRIDEMENT WITH POLY EXCHANGE performed by Ramiro Calderón MD at [address] [facility]  10/15/13    RIGHT    UPPER GASTROINTESTINAL ENDOSCOPY  6-7-2016    UPPER GASTROINTESTINAL ENDOSCOPY  04/02/2018     Social History     Tobacco Use    Smoking status: Former Smoker     Packs/day: 0.50     Years: 25.00     Pack years: 12.50     Types: Cigarettes     Last attempt to quit: 7/31/2017     Years since quitting: 3.0    Smokeless tobacco: Never Used   Substance Use Topics    Alcohol use: No     Alcohol/week: 0.0 standard drinks     Comment: last drink 2015    Drug use: No     Medications  Current Facility-Administered Medications on File Prior to Visit   Medication Dose Route Frequency Provider Last Rate Last Dose    0.9 % sodium chloride infusion   Intravenous Continuous Bernadine Ann MD        sodium chloride flush 0.9 % injection 10 mL  10 mL Intravenous 2 times per day Bernadine Ann MD        sodium chloride flush 0.9 % injection 10 mL  10 mL Intravenous PRN Bernadine Ann MD        ceFAZolin (ANCEF) 2 g in dextrose 5 % 100 mL IVPB  2 g Intravenous Once Jay Franco MD        oxyCODONE (ROXICODONE) immediate release tablet 10 mg  10 mg Oral Once Jay Franco MD         Current Outpatient Medications on File Prior to Visit   Medication Sig Dispense Refill    zolpidem (AMBIEN) 10 MG tablet Take by mouth nightly as needed for Sleep.       atorvastatin (LIPITOR) 40 MG tablet TAKE 1 TABLET BY MOUTH EVERY NIGHT AT BEDTIME 90 tablet 0    dextrose 5 % Statistics (for past 48 hrs)     Temp  Av.3 °F (36.8 °C)  Min: 98.3 °F (36.8 °C)   Min taken time: 20 1052  Max: 98.3 °F (36.8 °C)   Max taken time: 20 105  Pulse  Av  Min: 71   Min taken time: 20 1052  Max: 71   Max taken time: 20 1052  Resp  Av  Min: 16   Min taken time: 20 1052  Max: 16   Max taken time: 20 1052  BP  Min: 123/69   Min taken time: 20 1052  Max: 123/69   Max taken time: 20 1052  SpO2  Av %  Min: 97 %   Min taken time: 20 105  Max: 97 %   Max taken time: 20 1052  BP Readings from Last 3 Encounters:   20 123/69   20 118/62   20 121/74       BMI  There is no height or weight on file to calculate BMI. Estimated body mass index is 31.9 kg/m² as calculated from the following:    Height as of an earlier encounter on 20: 6' (1.829 m). Weight as of an earlier encounter on 20: 235 lb 3.7 oz (106.7 kg).     CBC   Lab Results   Component Value Date    WBC 6.4 2020    RBC 4.75 2020    RBC 4.94 2015    HGB 13.5 2020    HCT 41.7 2020    MCV 87.7 2020    RDW 15.4 2020     2020     CMP    Lab Results   Component Value Date     2020    K 4.6 2020    K 4.1 2020     2020    CO2 28 2020    BUN 7 2020    CREATININE 1.1 2020    GFRAA >60 2020    AGRATIO 1.8 2020    LABGLOM >60 2020    GLUCOSE 93 2020    GLUCOSE 84 2015    PROT 6.2 2020    PROT 6.7 2015    CALCIUM 9.2 2020    BILITOT 0.3 2020    ALKPHOS 119 2020    AST 13 2020    ALT 10 2020     BMP    Lab Results   Component Value Date     2020    K 4.6 2020    K 4.1 2020     2020    CO2 28 2020    BUN 7 2020    CREATININE 1.1 2020    CALCIUM 9.2 2020    GFRAA >60 2020    LABGLOM >60 2020    GLUCOSE 93 08/06/2020    GLUCOSE 84 01/07/2015     POCGlucose  No results for input(s): GLUCOSE in the last 72 hours. Coags    Lab Results   Component Value Date    PROTIME 13.0 08/06/2020    INR 1.12 08/06/2020    APTT 39.4 70/41/8559     HCG (If Applicable) No results found for: PREGTESTUR, PREGSERUM, HCG, HCGQUANT   ABGs No results found for: PHART, PO2ART, RYN1GFK, EVJ7QYO, BEART, E9FRZJME   Type & Screen (If Applicable)  No results found for: LABABO, LABRH                         BMI: Wt Readings from Last 3 Encounters:       NPO Status:  >8h                          Anesthesia Evaluation  Patient summary reviewed no history of anesthetic complications:   Airway: Mallampati: II  TM distance: >3 FB   Neck ROM: full  Mouth opening: > = 3 FB Dental:    (+) edentulous      Pulmonary: breath sounds clear to auscultation  (+) sleep apnea:      (-) COPD and asthma                           Cardiovascular:  Exercise tolerance: good (>4 METS),       (-) hypertension, past MI and CABG/stent      Rhythm: regular  Rate: normal                    Neuro/Psych:   (+) CVA: residual symptoms, headaches:, psychiatric history:   (-) seizures and TIA           GI/Hepatic/Renal:   (+) GERD:, morbid obesity     (-) liver disease and no renal disease       Endo/Other:        (-) diabetes mellitus, hypothyroidism               Abdominal:           Vascular:     - DVT and PE. Anesthesia Plan      general     ASA 3       Induction: intravenous. MIPS: Postoperative opioids intended and Prophylactic antiemetics administered. Anesthetic plan and risks discussed with patient. Plan discussed with CRNA. This pre-anesthesia assessment may be used as a history and physical.    DOS STAFF ADDENDUM:    Pt seen and examined, chart reviewed (including anesthesia, drug and allergy history). No interval changes to history and physical examination.   Anesthetic plan, risks, benefits, alternatives, and personnel involved discussed with patient. Patient verbalized an understanding and agrees to proceed.       Lucy Herring MD  August 12, 2020  11:04 AM

## 2020-08-12 NOTE — PROGRESS NOTES
4 Eyes Admission Assessment     I agree as the admission nurse that 2 RN's have performed a thorough Head to Toe Skin Assessment on the patient. ALL assessment sites listed below have been assessed on admission. Areas assessed by both nurses:   [x]   Head, Face, and Ears   [x]   Shoulders, Back, and Chest  [x]   Arms, Elbows, and Hands   [x]   Coccyx, Sacrum, and Ischum  [x]   Legs, Feet, and Heels        Does the Patient have Skin Breakdown?   No         Andrei Prevention initiated:  NA   Wound Care Orders initiated:  NA      WOC nurse consulted for Pressure Injury (Stage 3,4, Unstageable, DTI, NWPT, and Complex wounds):  NA      Nurse 1 eSignature: Electronically signed by Libby Gentile RN on 8/12/20 at 4:50 PM EDT    **SHARE this note so that the co-signing nurse is able to place an eSignature**    Nurse 2 eSignature: Electronically signed by Maggie Charles RN on 8/12/20 at 5:15 PM EDT

## 2020-08-12 NOTE — CONSULTS
History of tobacco use     Quit 7/2014    Migraine     chronic for 1 year    Morbid obesity ([address]     Movement disorder     Onychomycosis     Sleep apnea, obstructive     Severe uses cpap stop bang 6    Unspecified cerebral artery occlusion with cerebral infarction 2014    slight weakness in left arm    Wears dentures     full set    Wears glasses        Past Surgical History:    Past Surgical History:   Procedure Laterality Date    ABDOMEN SURGERY      gastric bypass    ABDOMEN SURGERY  4-7-2016    repair of recurrent incisional hernia with mesh, removal of old mesh, bilateral component separation    ABDOMINAL EXPLORATION SURGERY  1/11/16    exp lap, lysis of adhesions, small bowel resection    CARPAL TUNNEL RELEASE      bilat    DILATATION, ESOPHAGUS      ENDOSCOPY, COLON, DIAGNOSTIC      EYE SURGERY      GASTRIC BYPASS SURGERY  Jan 2009    Has lost about 200 pounds.  HERNIA REPAIR  3-    ventral    JOINT REPLACEMENT      KIDNEY REMOVAL Left 08/01/2018    [facility]     KNEE ARTHROSCOPY Right 7/11/12    RIGHT KNEE ARTHROSCOPY WITH CHONDROPLASTY    KNEE SURGERY  July 2012    right, arthroscopy    KNEE SURGERY Right 2/18/2020    INCISION AND DRAINAGE RIGHT KNEE AND WOULD VAC PLACEMENT performed by Chinmay Macias MD at [address] [facility]  2005    OTHER SURGICAL HISTORY Left 03/08/2016    CT biopsy ablasion left kidney    OTHER SURGICAL HISTORY  2016    small intestine 12 inch removed, 2 hernia surgeries, another surgery to drain infection from incision.      REVISION TOTAL KNEE ARTHROPLASTY Right 12/17/2019    RIGHT REVISION TIBIA TOTAL KNEE REPLACEMENT performed by Chinmay Macias MD at [address] [facility] Right 1/22/2020    RIGHT KNEE IRRIGATION AND DEBRIDEMENT WITH POLY EXCHANGE [facility] [address] [facility]    RIGHT  UPPER GASTROINTESTINAL ENDOSCOPY  6-7-2016    UPPER GASTROINTESTINAL ENDOSCOPY  04/02/2018       Current Medications:    Outpatient Medications Marked as Taking for the 8/12/20 encounter [facility] Encounter)   Medication Sig Dispense Refill    oxyCODONE (ROXICODONE) 5 MG immediate release tablet Take 1-2 tablets by mouth every 6 hours as needed for Pain for up to 5 days. 40 tablet 0    aspirin 81 MG tablet Take 1 tablet by mouth daily for 14 days Take twice a day for 14 days after knee surgery then resume daily dosing 28 tablet 0    zolpidem (AMBIEN) 10 MG tablet Take by mouth nightly as needed for Sleep.  atorvastatin (LIPITOR) 40 MG tablet TAKE 1 TABLET BY MOUTH EVERY NIGHT AT BEDTIME 90 tablet 0    dextrose 5 % SOLN 50 mL with ketamine 100 MG/ML SOLN 50 mg Infuse intravenously continuous Indications: 350 mg once every 3 months      traZODone (DESYREL) 100 MG tablet Take 2 tablets by mouth nightly 180 tablet 1    FLUoxetine (PROZAC) 20 MG capsule Take 1 capsule by mouth daily 90 capsule 1    acetaminophen (TYLENOL) 500 MG tablet Take 2 tablets by mouth 3 times daily (with meals) (Patient taking differently: Take 1,000 mg by mouth 3 times daily as needed ) 42 tablet 0    pantoprazole (PROTONIX) 40 MG tablet Take 1 tablet by mouth 2 times daily      calcium-vitamin D (OSCAL 500/200 D-3) 500-200 MG-UNIT per tablet Take 1 tablet by mouth 2 times daily       Multiple Vitamin TABS Take 1 tablet by mouth daily          Allergies:  Nsaids; Prochlorperazine; Valtrex [valacyclovir hcl];  Morphine; Morphine and related; and Tolmetin    Immunizations :   Immunization History   Administered Date(s) Administered    HIB PRP-T (ActHIB, Hiberix) 09/13/2010    Hib, unspecified 02/09/2015    Influenza Virus Vaccine 02/03/2018    Influenza Whole 10/04/2012    Influenza, Intradermal, Preservative free 10/28/2014    Influenza, Intradermal, Quadrivalent, Preservative Free 12/13/2016    Influenza, Quadv, IM, PF (6 mo and older Fluzone, Flulaval, Fluarix, and 3 yrs and older Afluria) 12/18/2019    Influenza, Quadv, Recombinant, IM PF (Flublok 18 yrs and older) 10/15/2018    Meningococcal ACWY Vaccine 01/15/2009    Meningococcal MCV4O (Menveo) 08/10/2019, 10/30/2019    Meningococcal MCV4P (Menactra) 02/09/2015, 08/17/2019, 10/30/2019    Meningococcal MPSV4 (Menomune) 01/15/2009    Pneumococcal Conjugate 13-valent ([id number]) 02/09/2015    Pneumococcal Polysaccharide ([id number]) 01/15/2009, 10/09/2013, 08/09/2019    Td, unspecified formulation 01/01/2007    Tdap (Boostrix, Adacel) 01/01/2007, 05/03/2014         Social History:   Social History     Tobacco Use    Smoking status: Former Smoker     Packs/day: 0.50     Years: 25.00     Pack years: 12.50     Types: Cigarettes     Last attempt to quit: 7/31/2017     Years since quitting: 3.0    Smokeless tobacco: Never Used   Substance Use Topics    Alcohol use: No     Alcohol/week: 0.0 standard drinks     Comment: last drink 2015    Drug use: No     Social History     Tobacco Use   Smoking Status Former Smoker    Packs/day: 0.50    Years: 25.00    Pack years: 12.50    Types: Cigarettes    Last attempt to quit: 7/31/2017    Years since quitting: 3.0   Smokeless Tobacco Never Used      Family History   Problem Relation Age of Onset    Cancer Father         Lymphoma    Arthritis Mother     Dementia Other     Diabetes Other     Cancer Other     Diabetes Maternal Uncle     Heart Disease Maternal Uncle     Depression Other     Heart Disease Maternal Uncle          REVIEW OF SYSTEMS:    No fever / chills / sweats. No weight loss. No visual change, eye pain, eye discharge. No oral lesion, sore throat, dysphagia. Denies cough / sputum/Sob   Denies chest pain, palpitations/ dizziness  Denies nausea/ vomiting/abdominal pain/diarrhea. Denies dysuria or change in urinary function. Denies joint swelling or pain. No myalgia, arthralgia.   No rashes, skin lesions   Denies focal weakness, sensory change or other neurologic symptoms  No lymph node swelling or tenderness.     RT KNEE pain, swelling and infection      PHYSICAL EXAM:      Vitals:    BP (!) 136/93   Pulse 69   Temp 98.4 °F (36.9 °C) (Oral)   Resp 16   Ht 6' (1.829 m)   Wt 235 lb 3.7 oz (106.7 kg)   SpO2 97%   BMI 31.90 kg/m²     General Appearance: sleepy ,in no acute distress, +  pallor, no icterus   Skin: warm and dry, no rash or erythema  Head: normocephalic and atraumatic  Eyes: pupils equal, round, and reactive to light, conjunctivae normal  ENT: tympanic membrane, external ear and ear canal normal bilaterally, nose without deformity, nasal mucosa and turbinates normal without polyps  Neck: supple and non-tender without mass, no thyromegaly  no cervical lymphadenopathy  Pulmonary/Chest: clear to auscultation bilaterally- no wheezes, rales or rhonchi, normal air movement, no respiratory distress  Cardiovascular: normal rate, regular rhythm, normal S1 and S2, no murmurs, rubs, clicks, or gallops, no carotid bruits  Abdomen: soft, non-tender, non-distended, normal bowel sounds, no masses or organomegaly  Extremities: no cyanosis, clubbing or edema  Musculoskeletal: normal range of motion, no joint swelling, deformity or tenderness  Neurologic: reflexes normal and symmetric, no cranial nerve deficit  Lines: IV  Rt knee brace and post op dressing+          DATA:    Lab Results   Component Value Date    WBC 6.4 08/06/2020    HGB 13.5 08/06/2020    HCT 41.7 08/06/2020    MCV 87.7 08/06/2020     08/06/2020     Lab Results   Component Value Date    CREATININE 1.1 08/06/2020    BUN 7 08/06/2020     08/06/2020    K 4.6 08/06/2020     08/06/2020    CO2 28 08/06/2020       Hepatic Function Panel:   Lab Results   Component Value Date    ALKPHOS 119 06/22/2020    ALT 10 06/22/2020    AST 13 06/22/2020    PROT 6.2 06/22/2020    PROT 6.7 01/07/2015    BILITOT 0.3 06/22/2020    BILIDIR <0.2 Color, Fluid  Yellow   Final  08/12/2020  1:38 PM  [address] [facility]    Appearance, Fluid  Cloudy   Final  08/12/2020  1:38 PM  [address] [address] [facility]    Clot Eval.  see below   Final  08/12/2020  1:38 PM  [address] [facility]    No Clots Seen    [place] Jaramilloerly, Fluid  20,808  /cumm  Final  08/12/2020  1:38 PM  [address] [facility]    RBC, Fluid  441  /cumm  Final  08/12/2020  1:38 PM  [address] [facility]    Neutrophil Count, Fluid  93  %  Final  08/12/2020  1:38 PM  [address] [facility]    Lymphocytes, Body Fluid  2  %  Final  08/12/2020  1:38 PM  [address] [facility]    Monocyte Count, Fluid  5  %  Final  08/12/2020  1:38 PM  [address] [facility]    Number of Cells Counted Fluid  100   Final  08/12/2020  1:38 PM  [address] [facility]         MICRO: cultures reviewed and updated by me   Procedure  Component  Value  Units  Date/Time    Culture, Surgical [[id number]]   Collected: 08/12/20 1352    Order Status: Sent  Specimen: Joint/Joint Fluid from Joint, Knee  Updated: 08/12/20 1426    Culture, Surgical [[id number]]   Collected: 08/12/20 1338    Order Status: Sent  Specimen: Joint/Joint Fluid from Joint, Knee  Updated: 08/12/20 1359    Culture, Aerorbic and Anarobic, Joint [[id number]]   Collected: 08/12/20 1338    Order Status: Sent  Specimen: Joint/Joint Fluid from Joint, Knee  Updated: 08/12/20 1359    Culture, Aerorbic and [address], Joint [[id number]]   Collected: 08/12/20 1352    Order Status: Canceled  Specimen: Joint/Joint Fluid from Joint, Knee       2/6/2020  2:46 PM - [facility] Incoming Lab Results From Soft (Epic Adt)     Specimen Information:  Joint, Knee; Joint/Joint Fluid          Component  Collected  Lab    Gram Stain Result  01/22/2020 11:32 AM  [address] [facility]    3+ WBC's (Polymorphonuclear)   No Epithelial Cells seen   No organisms seen    Culture, Joint Anaerobic  01/22/2020 11:32 AM  [address] [facility]    No anaerobes isolated    Organism Abnormal    01/22/2020 11:32 AM [facility]    Corynebacterium jeikeium    Culture, Joint Aerobic  01/22/2020 11:32 AM   - [facility]    Isolated from Broth   No further workup          Urine Culture  No results for input(s): Angelina Jeans in the last 72 hours. Imaging:   XR KNEE RIGHT (1-2 VIEWS)   Final Result   Postsurgical changes above             All pertinent images and reports for the current Hospitalization were reviewed by me. 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  Chondromalacia of patellofemoral joint    Chronic pain syndrome    History of stroke    Gastroesophageal reflux disease with esophagitis--on daily ppi    Intractable chronic migraine without aura and with status migrainosus    Iron deficiency anemia    B12 deficiency    Anastomotic ulcer    Malignant neoplasm of left kidney (HCC) clear cell. 8/1/18 Dr Ryder Li.     Total knee replacement status, right    Failed total knee, right, initial encounter ([address]    Infection of total knee replacement ([address]    History of depression    History of alcoholism ([address]    Receiving intravenous antibiotic treatment as outpatient    SBO (small bowel obstruction) ([address]    Partial small bowel obstruction ([address]    Infection of prosthetic right knee joint ([address]       Rt TKA infection  S/p Rt KNEE resection arthroplasty and spacer placement on 8/12  Pre op Knee fluid aspiration very abnormal  H/o Rt TKA ID and wound vac placement for delayed healing in Feb 2020 and cx negative  Rt TKA s/p ID and wound cx Broth cx Corynebacterium in the past  Index Rt TKA surgery on Dec 2019  H/O Multiple abdominal surgery for SBO  H/o Gastric bypass surgery   H/o Splenectomy and Nephrectomy     S/p Rt knee explanted and spacer in place will follow OR cx and adjust IV abx   cx may be affected by prior abx use      Labs, Microbiology, Radiology and pertinent results from current hospitalization and care every where were reviewed by me as a part of the consultation. PLAN :  1. IV Daptomycin x 600 mg x Q 24 HRS  due to single Kidney avoiding Vancomycin  2. IV Cefepime x 2 gm q 12 hrs  3. ESR, CRP  4. Add Fungal and AFB cx on the OR tissue/fluid   5. Will need x 6 weeks of IV abx and PICC line placement   6. Will d/w pt in AM when he is more awake as he is just post op and sleepy     Discussed with patient/Family and Nursing   Risk of Complications/Morbidity: High      · Illness(es)/ Infection present that pose threat to bodily function. · There is potential for severe exacerbation of infection/side effects of treatment. Therapy requires intensive monitoring for antimicrobial agent toxicity. Thanks for allowing me to participate in your patient's care please call me with any questions or concerns.     Dr. Shannan Harvey MD  [address] [place] [address] Physician  Phone: [phone]   Fax : [phone]

## 2020-08-12 NOTE — PROGRESS NOTES
BID  traZODone (DESYREL) tablet 200 mg, 200 mg, Oral, Nightly  zolpidem (AMBIEN) tablet 10 mg, 10 mg, Oral, Nightly PRN  0.45 % sodium chloride infusion, , Intravenous, Continuous  sodium chloride flush 0.9 % injection 10 mL, 10 mL, Intravenous, 2 times per day  sodium chloride flush 0.9 % injection 10 mL, 10 mL, Intravenous, PRN  ceFAZolin (ANCEF) 2 g in dextrose 5 % 100 mL IVPB, 2 g, Intravenous, Q8H  acetaminophen (TYLENOL) tablet 650 mg, 650 mg, Oral, Q4H PRN  oxyCODONE (ROXICODONE) immediate release tablet 5 mg, 5 mg, Oral, Q4H PRN **OR** oxyCODONE HCl (OXY-IR) immediate release tablet 10 mg, 10 mg, Oral, Q4H PRN  morphine (PF) injection 4 mg, 4 mg, Intravenous, Q3H PRN  sennosides-docusate sodium (SENOKOT-S) 8.6-50 MG tablet 1 tablet, 1 tablet, Oral, BID  magnesium hydroxide (MILK OF MAGNESIA) 400 MG/5ML suspension 30 mL, 30 mL, Oral, Daily PRN  ondansetron (ZOFRAN) injection 4 mg, 4 mg, Intravenous, Q6H PRN  enoxaparin (LOVENOX) injection 30 mg, 30 mg, Subcutaneous, BID  insulin glargine (LANTUS) injection vial 27 Units, 0.25 Units/kg, Subcutaneous, Nightly  insulin lispro (HUMALOG) injection vial 9 Units, 0.08 Units/kg, Subcutaneous, TID WC  insulin lispro (HUMALOG) injection vial 0-6 Units, 0-6 Units, Subcutaneous, TID WC  insulin lispro (HUMALOG) injection vial 0-3 Units, 0-3 Units, Subcutaneous, Nightly  glucose (GLUTOSE) 40 % oral gel 15 g, 15 g, Oral, PRN  dextrose 50 % IV solution, 12.5 g, Intravenous, PRN  glucagon (rDNA) injection 1 mg, 1 mg, Intramuscular, PRN  dextrose 5 % solution, 100 mL/hr, Intravenous, PRN  insulin lispro (HUMALOG) injection vial 0-6 Units, 0-6 Units, Subcutaneous, Q4H  lidocaine PF 1 % injection 5 mL, 5 mL, Intradermal, Once  sodium chloride flush 0.9 % injection 10 mL, 10 mL, Intravenous, 2 times per day  sodium chloride flush 0.9 % injection 10 mL, 10 mL, Intravenous, PRN    ASSESSMENT AND PLAN      Post right TKA resection with spacer placement  antibx as ordered for right TKA infection. ID consulted. PICC line ordered  DVT prophylaxis ordered, Lovenox as inpt then switch to ASA 81mg twice at day for 14 days for DVT prophylaxis  PT OT for ADL's and ambulation as tolerated righ tknee in immobilizer and WBAT  SS for DC planning, home with home care for IV antibx planned.    IV or PO pain med as ordered    David Lopez 89  8/12/2020  4:38 PM

## 2020-08-12 NOTE — PROGRESS NOTES
Met with patient at bedside, patient is in bed alert and orientedx4. discussed role of nurse navigator and gave contact information. Reviewed reasons to call with questions or concerns, importance of TEDS, Incentive spirometer, pain medication, and physical and occupational therapy. 2/4 bed rails up, bed in lowest position, fall precautions in place, call light within reach. Pulses present bilaterally +2 pedal, no drainage or odor noted at surgical dressing right knee. Immobilizer and Ace  Dressing clean, dry, and intact. Ice in place. Luca and scds on LLE. Neurovascular checks performed and WNLs, patient denies numbness or tingling. DC Plan: home with [facility] and amerimed for iv antibiotics- I notified jose alfredo of patient's preference. wife to transport patient.   DME needs:denies    Cristiana More  Orthopedic Nurse Navigator  Phone number: [phone]    Future Appointments   Date Time Provider Timothy Beal   8/27/2020 10:30 AM MD Gayle Bardales   9/11/2020 12:00 PM LILLIE Potts - CNP [facility]     Electronically signed by Rodrigo Essex, RN on 8/12/2020 at 5:16 PM

## 2020-08-12 NOTE — ANESTHESIA POSTPROCEDURE EVALUATION
Department of Anesthesiology  Postprocedure Note    Patient: Arianne Stallworth  MRN: [id number]  YOB: 1970  Date of evaluation: 8/12/2020  Time:  3:14 PM     Procedure Summary     Date:  08/12/20 Room / Location:  [address]    Anesthesia Start:  1657 Anesthesia Stop:  4409    Procedure:  RIGHT ARTHROPLASY  RESECTION WITH INSERTION OF SPACER (Right Knee) Diagnosis:  (RIGHT KNEE INFECTION)    Surgeon:  Shae Zelaya MD Responsible Provider:  Alberto Santos MD    Anesthesia Type:  general ASA Status:  3          Anesthesia Type: general    Cande Phase I: Cande Score: 10    Cande Phase II:      Last vitals: Reviewed and per EMR flowsheets.        Anesthesia Post Evaluation    Patient location during evaluation: PACU  Patient participation: complete - patient participated  Level of consciousness: awake and alert  Pain score: 3  Airway patency: patent  Nausea & Vomiting: no nausea and no vomiting  Complications: no  Cardiovascular status: blood pressure returned to baseline  Respiratory status: acceptable  Hydration status: euvolemic

## 2020-08-12 NOTE — PROGRESS NOTES
Pt resting in bed in afternoon, wife at bedside. He c/o RLE pain, but denied having any nausea, numbness, or tingling. Pulses palpable, skin warm to touch. He has moderate flexion in BLE. Immobilizer in place to RLE. Fall precautions in place, belongings within reach. Will continue to monitor and assess.

## 2020-08-12 NOTE — PROGRESS NOTES
Pt arrived to room 3112 from PACU. Vital signs taken and were WNL. Assessment completed. Pt oriented to room, bed, phone, and call light. Fall precautions in place. Call light, bedside table and phone within easy reach. Pt instructed to use call light to call for assistance.

## 2020-08-12 NOTE — BRIEF OP NOTE
Brief Postoperative Note      Patient: Yanet Caputo  YOB: 1970  MRN: [id number]    Date of Procedure: 8/12/2020    Pre-Op Diagnosis: RIGHT KNEE INFECTION    Post-Op Diagnosis: Same       Procedure(s):  RIGHT ARTHROPLASY  RESECTION WITH INSERTION OF SPACER    Surgeon(s):  Joanne Mark MD    Assistant:  Physician Assistant: Catheline Goodpasture, PA    Anesthesia: General    Estimated Blood Loss (mL): less than 50     Complications: None    Specimens:   ID Type Source Tests Collected by Time Destination   1 : RIGHT KNEE FLUID FOR CULTURE, STAT GRAM STAIN AND CBC Joint/Joint Fluid Joint, Knee BODY FLUID CELL COUNT WITH DIFFERENTIAL, CULTURE, SURGICAL, CULTURE, AERORBIC AND ANAROBIC, JOINT Joanne Mark MD 8/12/2020 1338    2 : RIGHT KNEE synovial tissue  FOR CULTURE, STAT GRAM STAIN and micro evaluation Joint/Joint Fluid Joint, Knee CULTURE, SURGICAL, CULTURE, AERORBIC AND ANAROBIC, JOINT Joanne Mark MD 8/12/2020 1352    A : right total knee components for gross only Specimen Joint, Knee SURGICAL PATHOLOGY Joanne Mark MD 8/12/2020 1359        Implants:  Implant Name Type Inv.  Item Serial No.  Lot No. LRB No. Used Action   CEMENT BONE REFOBACIN 1X40 Cement CEMENT BONE REFOBACIN 1X40  [employer] [id number] Right 2 Implanted   IMPL KNEE TIB ARTICULAR SZ 5TO6 17MM Knee IMPL KNEE TIB ARTICULAR SZ 5TO6 17MM  [employer] [id number] Right 1 Explanted   IMPL KNEE FEM COMP NXGN CR FLX PRECOAT Knee IMPL KNEE FEM COMP NXGN CR FLX PRECOAT  [employer] [id number] Right 1 Implanted   IMPL KNEE NEXGEN CR ALL POLY TIB  SZ 6 GRN 17MM (FEM C H) Knee IMPL KNEE NEXGEN CR ALL POLY TIB  SZ 6 GRN 17MM (FEM C H)  [employer] [id number] Right 1 Implanted         Drains: * No LDAs found *    Findings: Questionable deep R TKA periprosthetic infection    The patient was counseled at length about the risks of michelle Covid-19 during their perioperative period and any recovery window from their

## 2020-08-13 LAB
A/G RATIO: 1.5 (ref 1.1–2.2)
ALBUMIN SERPL-MCNC: 3.8 G/DL (ref 3.4–5)
ALP BLD-CCNC: 110 U/L (ref 40–129)
ALT SERPL-CCNC: 7 U/L (ref 10–40)
ANION GAP SERPL CALCULATED.3IONS-SCNC: 11 MMOL/L (ref 3–16)
AST SERPL-CCNC: 10 U/L (ref 15–37)
BASOPHILS ABSOLUTE: 0 K/UL (ref 0–0.2)
BASOPHILS RELATIVE PERCENT: 0.1 %
BILIRUB SERPL-MCNC: 0.3 MG/DL (ref 0–1)
BUN BLDV-MCNC: 7 MG/DL (ref 7–20)
C-REACTIVE PROTEIN: 6.5 MG/L (ref 0–5.1)
CALCIUM SERPL-MCNC: 9 MG/DL (ref 8.3–10.6)
CHLORIDE BLD-SCNC: 107 MMOL/L (ref 99–110)
CO2: 23 MMOL/L (ref 21–32)
CREAT SERPL-MCNC: 0.9 MG/DL (ref 0.9–1.3)
EOSINOPHILS ABSOLUTE: 0 K/UL (ref 0–0.6)
EOSINOPHILS RELATIVE PERCENT: 0 %
GFR AFRICAN AMERICAN: >60
GFR NON-AFRICAN AMERICAN: >60
GLOBULIN: 2.5 G/DL
GLUCOSE BLD-MCNC: 135 MG/DL (ref 70–99)
GLUCOSE BLD-MCNC: 143 MG/DL (ref 70–99)
HCT VFR BLD CALC: 37.2 % (ref 40.5–52.5)
HEMOGLOBIN: 12.1 G/DL (ref 13.5–17.5)
LYMPHOCYTES ABSOLUTE: 1.1 K/UL (ref 1–5.1)
LYMPHOCYTES RELATIVE PERCENT: 8.2 %
MCH RBC QN AUTO: 28.3 PG (ref 26–34)
MCHC RBC AUTO-ENTMCNC: 32.6 G/DL (ref 31–36)
MCV RBC AUTO: 87.1 FL (ref 80–100)
MONOCYTES ABSOLUTE: 0.9 K/UL (ref 0–1.3)
MONOCYTES RELATIVE PERCENT: 6.6 %
NEUTROPHILS ABSOLUTE: 11.5 K/UL (ref 1.7–7.7)
NEUTROPHILS RELATIVE PERCENT: 85.1 %
PDW BLD-RTO: 15.2 % (ref 12.4–15.4)
PERFORMED ON: ABNORMAL
PLATELET # BLD: 346 K/UL (ref 135–450)
PMV BLD AUTO: 9.1 FL (ref 5–10.5)
POTASSIUM SERPL-SCNC: 4.6 MMOL/L (ref 3.5–5.1)
PRO-BNP: 236 PG/ML (ref 0–124)
RBC # BLD: 4.27 M/UL (ref 4.2–5.9)
SEDIMENTATION RATE, ERYTHROCYTE: 5 MM/HR (ref 0–15)
SODIUM BLD-SCNC: 141 MMOL/L (ref 136–145)
TOTAL CK: 70 U/L (ref 39–308)
TOTAL PROTEIN: 6.3 G/DL (ref 6.4–8.2)
WBC # BLD: 13.5 K/UL (ref 4–11)

## 2020-08-13 PROCEDURE — 83880 ASSAY OF NATRIURETIC PEPTIDE: CPT

## 2020-08-13 PROCEDURE — 6370000000 HC RX 637 (ALT 250 FOR IP): Performed by: ORTHOPAEDIC SURGERY

## 2020-08-13 PROCEDURE — 85652 RBC SED RATE AUTOMATED: CPT

## 2020-08-13 PROCEDURE — 94760 N-INVAS EAR/PLS OXIMETRY 1: CPT

## 2020-08-13 PROCEDURE — 6360000002 HC RX W HCPCS: Performed by: INTERNAL MEDICINE

## 2020-08-13 PROCEDURE — 99233 SBSQ HOSP IP/OBS HIGH 50: CPT | Performed by: INTERNAL MEDICINE

## 2020-08-13 PROCEDURE — 86140 C-REACTIVE PROTEIN: CPT

## 2020-08-13 PROCEDURE — 2580000003 HC RX 258: Performed by: INTERNAL MEDICINE

## 2020-08-13 PROCEDURE — 80053 COMPREHEN METABOLIC PANEL: CPT

## 2020-08-13 PROCEDURE — 82550 ASSAY OF CK (CPK): CPT

## 2020-08-13 PROCEDURE — 85025 COMPLETE CBC W/AUTO DIFF WBC: CPT

## 2020-08-13 PROCEDURE — 97166 OT EVAL MOD COMPLEX 45 MIN: CPT

## 2020-08-13 PROCEDURE — 97535 SELF CARE MNGMENT TRAINING: CPT

## 2020-08-13 PROCEDURE — 6360000002 HC RX W HCPCS: Performed by: ORTHOPAEDIC SURGERY

## 2020-08-13 PROCEDURE — 97116 GAIT TRAINING THERAPY: CPT

## 2020-08-13 PROCEDURE — C1751 CATH, INF, PER/CENT/MIDLINE: HCPCS

## 2020-08-13 PROCEDURE — 36569 INSJ PICC 5 YR+ W/O IMAGING: CPT

## 2020-08-13 PROCEDURE — 97162 PT EVAL MOD COMPLEX 30 MIN: CPT

## 2020-08-13 PROCEDURE — 76937 US GUIDE VASCULAR ACCESS: CPT

## 2020-08-13 PROCEDURE — 2060000000 HC ICU INTERMEDIATE R&B

## 2020-08-13 PROCEDURE — 6370000000 HC RX 637 (ALT 250 FOR IP): Performed by: NURSE PRACTITIONER

## 2020-08-13 PROCEDURE — 6360000002 HC RX W HCPCS: Performed by: NURSE PRACTITIONER

## 2020-08-13 PROCEDURE — 2580000003 HC RX 258: Performed by: ORTHOPAEDIC SURGERY

## 2020-08-13 RX ORDER — DIPHENHYDRAMINE HCL 25 MG
25 TABLET ORAL EVERY 6 HOURS PRN
Status: DISCONTINUED | OUTPATIENT
Start: 2020-08-13 | End: 2020-08-14 | Stop reason: HOSPADM

## 2020-08-13 RX ADMIN — OXYCODONE HYDROCHLORIDE 10 MG: 10 TABLET ORAL at 03:07

## 2020-08-13 RX ADMIN — CEFEPIME HYDROCHLORIDE 2 G: 2 INJECTION, POWDER, FOR SOLUTION INTRAVENOUS at 04:53

## 2020-08-13 RX ADMIN — MORPHINE SULFATE 4 MG: 4 INJECTION, SOLUTION INTRAMUSCULAR; INTRAVENOUS at 00:20

## 2020-08-13 RX ADMIN — HYDROMORPHONE HYDROCHLORIDE 1 MG: 1 INJECTION, SOLUTION INTRAMUSCULAR; INTRAVENOUS; SUBCUTANEOUS at 11:11

## 2020-08-13 RX ADMIN — ASPIRIN 81 MG: 81 TABLET, CHEWABLE ORAL at 08:30

## 2020-08-13 RX ADMIN — MORPHINE SULFATE 4 MG: 4 INJECTION, SOLUTION INTRAMUSCULAR; INTRAVENOUS at 08:31

## 2020-08-13 RX ADMIN — ACETAMINOPHEN 1000 MG: 500 TABLET, FILM COATED ORAL at 17:04

## 2020-08-13 RX ADMIN — OXYCODONE HYDROCHLORIDE 10 MG: 10 TABLET ORAL at 17:04

## 2020-08-13 RX ADMIN — SODIUM CHLORIDE: 4.5 INJECTION, SOLUTION INTRAVENOUS at 04:53

## 2020-08-13 RX ADMIN — DOCUSATE SODIUM 50 MG AND SENNOSIDES 8.6 MG 1 TABLET: 8.6; 5 TABLET, FILM COATED ORAL at 08:30

## 2020-08-13 RX ADMIN — PANTOPRAZOLE SODIUM 40 MG: 40 TABLET, DELAYED RELEASE ORAL at 08:31

## 2020-08-13 RX ADMIN — TRAZODONE HYDROCHLORIDE 200 MG: 100 TABLET ORAL at 21:45

## 2020-08-13 RX ADMIN — THERA TABS 1 TABLET: TAB at 08:30

## 2020-08-13 RX ADMIN — OYSTER SHELL CALCIUM WITH VITAMIN D 1 TABLET: 500; 200 TABLET, FILM COATED ORAL at 08:31

## 2020-08-13 RX ADMIN — ENOXAPARIN SODIUM 30 MG: 30 INJECTION SUBCUTANEOUS at 08:30

## 2020-08-13 RX ADMIN — OYSTER SHELL CALCIUM WITH VITAMIN D 1 TABLET: 500; 200 TABLET, FILM COATED ORAL at 21:45

## 2020-08-13 RX ADMIN — Medication 10 ML: at 21:48

## 2020-08-13 RX ADMIN — ACETAMINOPHEN 1000 MG: 500 TABLET, FILM COATED ORAL at 08:31

## 2020-08-13 RX ADMIN — MORPHINE SULFATE 4 MG: 4 INJECTION, SOLUTION INTRAMUSCULAR; INTRAVENOUS at 04:52

## 2020-08-13 RX ADMIN — ZOLPIDEM TARTRATE 10 MG: 5 TABLET ORAL at 21:45

## 2020-08-13 RX ADMIN — OXYCODONE HYDROCHLORIDE 10 MG: 10 TABLET ORAL at 12:30

## 2020-08-13 RX ADMIN — DIPHENHYDRAMINE HCL 25 MG: 25 TABLET ORAL at 15:13

## 2020-08-13 RX ADMIN — CEFEPIME HYDROCHLORIDE 2 G: 2 INJECTION, POWDER, FOR SOLUTION INTRAVENOUS at 17:04

## 2020-08-13 RX ADMIN — ATORVASTATIN CALCIUM 40 MG: 40 TABLET, FILM COATED ORAL at 08:31

## 2020-08-13 RX ADMIN — DOCUSATE SODIUM 50 MG AND SENNOSIDES 8.6 MG 1 TABLET: 8.6; 5 TABLET, FILM COATED ORAL at 21:45

## 2020-08-13 RX ADMIN — SODIUM CHLORIDE 600 MG: 9 INJECTION, SOLUTION INTRAVENOUS at 01:04

## 2020-08-13 RX ADMIN — PANTOPRAZOLE SODIUM 40 MG: 40 TABLET, DELAYED RELEASE ORAL at 21:45

## 2020-08-13 RX ADMIN — OXYCODONE HYDROCHLORIDE 10 MG: 10 TABLET ORAL at 21:45

## 2020-08-13 RX ADMIN — ACETAMINOPHEN 1000 MG: 500 TABLET, FILM COATED ORAL at 12:30

## 2020-08-13 RX ADMIN — FLUOXETINE 20 MG: 20 CAPSULE ORAL at 08:31

## 2020-08-13 RX ADMIN — ENOXAPARIN SODIUM 30 MG: 30 INJECTION SUBCUTANEOUS at 21:47

## 2020-08-13 RX ADMIN — HYDROMORPHONE HYDROCHLORIDE 1 MG: 1 INJECTION, SOLUTION INTRAMUSCULAR; INTRAVENOUS; SUBCUTANEOUS at 23:46

## 2020-08-13 ASSESSMENT — PAIN DESCRIPTION - PAIN TYPE
TYPE: SURGICAL PAIN

## 2020-08-13 ASSESSMENT — PAIN SCALES - WONG BAKER
WONGBAKER_NUMERICALRESPONSE: 0

## 2020-08-13 ASSESSMENT — PAIN DESCRIPTION - PROGRESSION
CLINICAL_PROGRESSION: NOT CHANGED
CLINICAL_PROGRESSION: GRADUALLY IMPROVING
CLINICAL_PROGRESSION: NOT CHANGED
CLINICAL_PROGRESSION: GRADUALLY IMPROVING
CLINICAL_PROGRESSION: GRADUALLY IMPROVING
CLINICAL_PROGRESSION: NOT CHANGED
CLINICAL_PROGRESSION: GRADUALLY IMPROVING

## 2020-08-13 ASSESSMENT — PAIN - FUNCTIONAL ASSESSMENT
PAIN_FUNCTIONAL_ASSESSMENT: PREVENTS OR INTERFERES SOME ACTIVE ACTIVITIES AND ADLS

## 2020-08-13 ASSESSMENT — PAIN DESCRIPTION - DESCRIPTORS
DESCRIPTORS: ACHING

## 2020-08-13 ASSESSMENT — PAIN DESCRIPTION - ONSET
ONSET: ON-GOING

## 2020-08-13 ASSESSMENT — PAIN SCALES - GENERAL
PAINLEVEL_OUTOF10: 7
PAINLEVEL_OUTOF10: 0
PAINLEVEL_OUTOF10: 7
PAINLEVEL_OUTOF10: 7
PAINLEVEL_OUTOF10: 9
PAINLEVEL_OUTOF10: 0
PAINLEVEL_OUTOF10: 7
PAINLEVEL_OUTOF10: 9
PAINLEVEL_OUTOF10: 0
PAINLEVEL_OUTOF10: 8
PAINLEVEL_OUTOF10: 8
PAINLEVEL_OUTOF10: 4
PAINLEVEL_OUTOF10: 0
PAINLEVEL_OUTOF10: 7

## 2020-08-13 ASSESSMENT — PAIN DESCRIPTION - LOCATION
LOCATION: KNEE

## 2020-08-13 ASSESSMENT — PAIN DESCRIPTION - ORIENTATION
ORIENTATION: RIGHT

## 2020-08-13 ASSESSMENT — PAIN DESCRIPTION - FREQUENCY
FREQUENCY: CONTINUOUS

## 2020-08-13 NOTE — CONSULTS
Office : [phone]     Fax :[phone]       Nephrology Consult Note      Patient's Name: Ang Donaldson  11:11 AM  8/13/2020    Reason for Consult:  H/o nephrectomy , need PICC line      Requesting Physician:  Darya Walsh MD      Chief Complaint: admitted for RT knee resection and spacer placement       History of Present Ilness:    Ang Donaldson is a 53 y.o. male with pmh of  Gastric by pass surgery with complicated course intestinal obstruction and surgeries and drainage and also had Left Nephrectomy for RCC found during surgery in the past and pt has lost significant wt from the surgery and had Rt knee surgeries in the  Past underwent Rt TKA by Margot Vegas on 12/19 and following this was admitted with Rt knee swelling and fluid collection was taken back to OR for ID and liner exchange by  on 1/22 and OP cx negative but one cx positive for Corynebacterium jeikeium from BROTH cx only he completed a course of iv abx and underwent repeat ID and wound vac placement by Margot Vegas for delayed wound healing in Feb 2020 and he completed IV abx course and placed on oral Doxycyline suppression was seen in ID office in March 2020. He is now admitted for Rt knee resection arthroplasty and spacer placement by  pre op KNee FLUID aspiration on 8/12 was abnormal and we are consulted for recommendations. He is very sleepy post op and unable to give much details. Will need IV abx long term.    Past Medical History:   Diagnosis Date    Alcoholism [facility])     last drink 2015    Allergic migraine with status migrainosus     Allergic rhinitis, seasonal     Anemia     Arthritis     right knee    Vaughn's esophagus     Benign intracranial hypertension     Cancer ([facility] [address] renal     Carpal tunnel syndrome     Chronic pain     Depression     Depression     GERD (gastroesophageal reflux disease)     Headache(784.0)     History of blood transfusion     History of tobacco use     Quit 7/2014    Migraine     chronic for 1 year    Morbid obesity ([address]     Movement disorder     Onychomycosis     Sleep apnea, obstructive     Severe uses cpap stop bang 6    Unspecified cerebral artery occlusion with cerebral infarction 2014    slight weakness in left arm    Wears dentures     full set    Wears glasses        Past Surgical History:   Procedure Laterality Date    ABDOMEN SURGERY      gastric bypass    ABDOMEN SURGERY  4-7-2016    repair of recurrent incisional hernia with mesh, removal of old mesh, bilateral component separation    ABDOMINAL EXPLORATION SURGERY  1/11/16    exp lap, lysis of adhesions, small bowel resection    CARPAL TUNNEL RELEASE      bilat    DILATATION, ESOPHAGUS      ENDOSCOPY, COLON, DIAGNOSTIC      EYE SURGERY      GASTRIC BYPASS SURGERY  Jan 2009    Has lost about 200 pounds.  HERNIA REPAIR  3-    ventral    JOINT REPLACEMENT      KIDNEY REMOVAL Left 08/01/2018    KNEE ARTHROSCOPY Right 7/11/2013    Dr.Robert Walters-Adelina     KNEE ARTHROSCOPY Right 7/11/12    RIGHT KNEE ARTHROSCOPY WITH CHONDROPLASTY    KNEE SURGERY  July 2012    right, arthroscopy    KNEE SURGERY Right 2/18/2020    INCISION AND DRAINAGE RIGHT KNEE AND WOULD VAC PLACEMENT performed by Arianne Silva MD at [address] [place] [address]  2005    OTHER SURGICAL HISTORY Left 03/08/2016    CT biopsy ablasion left kidney    OTHER SURGICAL HISTORY  2016    small intestine 12 inch removed, 2 hernia surgeries, another surgery to drain infection from incision.      REVISION TOTAL KNEE ARTHROPLASTY Right 12/17/2019    RIGHT REVISION TIBIA TOTAL KNEE REPLACEMENT performed by [facility]rianne Silva MD at [facility] Right 1/22/2020    RIGHT KNEE IRRIGATION AND DEBRIDEMENT WITH POLY EXCHANGE performed by Evan Ramos MD at [address] [facility]  10/15/13    RIGHT    TOTAL KNEE ARTHROPLASTY Right 8/12/2020    RIGHT ARTHROPLASY  RESECTION WITH INSERTION OF SPACER performed by Stella Stringer MD at [facility]  6-7-2016    UPPER GASTROINTESTINAL ENDOSCOPY  04/02/2018       Family History   Problem Relation Age of Onset    Cancer Father         Lymphoma    Arthritis Mother     Dementia Other     Diabetes Other     Cancer Other     Diabetes Maternal Uncle     Heart Disease Maternal Uncle     Depression Other     Heart Disease Maternal Uncle         reports that he quit smoking about 3 years ago. His smoking use included cigarettes. He has a 12.50 pack-year smoking history. He has never used smokeless tobacco. He reports that he does not drink alcohol or use drugs. Allergies:  Nsaids; Morphine; Prochlorperazine; Valtrex [valacyclovir hcl];  Morphine and related; and Tolmetin    Current Medications:    HYDROmorphone (DILAUDID) injection 1 mg, Q8H PRN  acetaminophen (TYLENOL) tablet 1,000 mg, TID WC  aspirin chewable tablet 81 mg, Daily  atorvastatin (LIPITOR) tablet 40 mg, Daily  calcium-vitamin D 500-200 MG-UNIT per tablet 1 tablet, BID  dicyclomine (BENTYL) capsule 10 mg, Q6H PRN  FLUoxetine (PROZAC) capsule 20 mg, Daily  multivitamin 1 tablet, Daily  pantoprazole (PROTONIX) tablet 40 mg, BID  traZODone (DESYREL) tablet 200 mg, Nightly  zolpidem (AMBIEN) tablet 10 mg, Nightly PRN  0.45 % sodium chloride infusion, Continuous  sodium chloride flush 0.9 % injection 10 mL, 2 times per day  acetaminophen (TYLENOL) tablet 650 mg, Q4H PRN  oxyCODONE (ROXICODONE) immediate release tablet 5 mg, Q4H PRN    Or  oxyCODONE HCl (OXY-IR) immediate release tablet 10 mg, Q4H PRN  morphine (PF) injection 4 mg, Q3H PRN  sennosides-docusate sodium (SENOKOT-S) 8.6-50 MG tablet 1 tablet, BID  magnesium hydroxide (MILK OF MAGNESIA) 400 MG/5ML suspension 30 mL, Daily PRN  ondansetron (ZOFRAN) injection 4 mg, Q6H PRN  enoxaparin (LOVENOX) injection 30 mg, BID  lidocaine PF 1 % injection 5 mL, Once  sodium chloride flush 0.9 % injection 10 mL, PRN  DAPTOmycin (CUBICIN) 600 mg in sodium chloride 0.9 % 50 mL IVPB, Q24H  cefepime (MAXIPIME) 2 g IVPB minibag, Q12H            Physical exam:     Vitals:  /61   Pulse 67   Temp 98.2 °F (36.8 °C) (Oral)   Resp 16   Ht 6' (1.829 m)   Wt 233 lb 14.5 oz (106.1 kg)   SpO2 96%   BMI 31.72 kg/m²   Constitutional:  OAA X3 NAD  Skin: no rash, turgor wnl  Heent:  eomi, mmm  Neck: no bruits or jvd noted  Cardiovascular:  S1, S2 without m/r/g  Respiratory: CTA B without w/r/r  Abdomen:  +bs, soft, nt, nd  Ext: no  lower extremity edema  Psychiatric: mood and affect appropriate  Musculoskeletal:  Rom, muscular strength intact    Labs:  CBC:   Recent Labs     08/13/20  0513   WBC 13.5*   HGB 12.1*        BMP:    Recent Labs     08/13/20  0513      K 4.6      CO2 23   BUN 7   CREATININE 0.9   GLUCOSE 143*     Ca/Mg/Phos:   Recent Labs     08/13/20  0513   CALCIUM 9.0     Hepatic:   Recent Labs     08/13/20  0513   AST 10*   ALT 7*   BILITOT 0.3   ALKPHOS 110     Troponin: No results for input(s): TROPONINI in the last 72 hours. BNP: No results for input(s): BNP in the last 72 hours. Lipids: No results for input(s): CHOL, TRIG, HDL, LDLCALC, LABVLDL in the last 72 hours. ABGs: No results for input(s): PHART, PO2ART, MTP0SHO in the last 72 hours. INR: No results for input(s): INR in the last 72 hours. UA:No results for input(s): Kena Drone, GLUCOSEU, BILIRUBINUR, [place], BLOODU, PHUR, PROTEINU, UROBILINOGEN, NITRU, LEUKOCYTESUR, LABMICR, URINETYPE in the last 72 hours. Urine Microscopic: No results for input(s): LABCAST, BACTERIA, COMU, HYALCAST, WBCUA, RBCUA, EPIU in the last 72 hours.   Urine Culture: No results for

## 2020-08-13 NOTE — PROGRESS NOTES
Pt resting in bed with eyes closed at this time. Dressing removed per order. Minimal drainage noted. Immobilizer in place this AM. Pt understands PICC will be placed today. No needs expressed this AM. Call light in reach. Will continue to monitor.

## 2020-08-13 NOTE — PROGRESS NOTES
Pt resting in bed this PM. Shift assessment and PM medications completed. Surgical dressing clean dry and intact. Immobilizer in place, pt understands importance of immobilizer. No further needs voiced. Call light in reach. Will continue to monitor.

## 2020-08-13 NOTE — PROGRESS NOTES
Order for PICC line from [address] [place] [address]. Successful insertion of a right single lumen PICC line into pt's basilic vein. No issues gaining access or advancing PICC line. PICC tip terminates in the SVC according to [facility] 3CG tip confirmation system. PICC has brisk blood return and flushes without resistance. PICC is okay to use. Site is CDI with no issues noted. Pt instructed to keep arm still for 30 minutes to maintain hemostasis. Bed lowered, side rails up, call light in place.      Cecelia THORNTON updated on POC

## 2020-08-13 NOTE — PROGRESS NOTES
Physical Therapy    Facility/Department: [address] ORTHOPEDICS  Initial Assessment    NAME: Oxana Montano  : 1970  MRN: [id number]    Date of Service: 2020    Assessment / Discharge Recommendations:  -managed well out of bed to ambulate in room with knee immobilizer in place WBAT using rolling walker  -anticipate discharge to home when medically ready  -he has walker to use at home  -will follow and make recommendations for Home PT - at first glance do not anticipate need for Home PT as he is well familiar with basic LE isometrics and is required to use the knee immobilizer    Body structures, Functions, Activity limitations: Decreased functional mobility ; Decreased ADL status; Decreased ROM  Prognosis: Good  Decision Making: Medium Complexity  REQUIRES PT FOLLOW UP: Yes  Activity Tolerance  Activity Tolerance: Patient Tolerated treatment well       Patient Diagnosis(es): The encounter diagnosis was Infection associated with internal right knee prosthesis, initial encounter ([facility] [address] has a past medical history of Alcoholism ([address] Allergic migraine with status migrainosus, Allergic rhinitis, seasonal, Anemia, Arthritis, Vaughn's esophagus, Benign intracranial hypertension, Cancer ([address] Carpal tunnel syndrome, Chronic pain, Depression, Depression, GERD (gastroesophageal reflux disease), Headache(784.0), History of blood transfusion, History of tobacco use, Migraine, Morbid obesity ([address] Movement disorder, Onychomycosis, Sleep apnea, obstructive, Unspecified cerebral artery occlusion with cerebral infarction, Wears dentures, and Wears glasses. has a past surgical history that includes Gastric bypass surgery (2009); Splenectomy; LASIK (); knee surgery (2012); Carpal tunnel release; laparotomy; Knee arthroscopy (Right, 2013); Knee arthroscopy (Right, 12); Total knee arthroplasty (10/15/13);  Endoscopy, colon, diagnostic; Dilatation, esophagus; eye surgery; joint replacement; Independent  Cognition   Cognition  Overall Cognitive Status: WFL  Objective  ROM and strength non-surgical limbs grossly wfl  Motor Control  Gross Motor?: WFL  Sensation  Overall Sensation Status: WFL  Bed mobility  Supine to Sit: Stand by assistance  Sit to Supine: Stand by assistance  Transfers  Sit to Stand: Stand by assistance  Stand to sit: Stand by assistance  Ambulation  Ambulation?: Yes  Ambulation 1  Surface: level tile  Device: Rolling Walker  Assistance: Stand by assistance  Quality of Gait: step to progressing to partial step through pattern with good heel contact and good weight bearing  Distance: to and from the bathroom for ~20 feet overall (able to manage more  Comments: steady on the walker  Stairs/Curb  Stairs?: No  Balance  Comments: midline in sitting at the EOB and in static stance on the walker  -dynamic is good on rolling walker  Exercises  Quad Sets: 10 per hour  Gluteal Sets: 10 per hour  Ankle Pumps: 30 per hour in easy pace  Comments: encouraged practice with the spirometer as instructed by RT   Plan   Plan  Times per week: qdx1 then 3-5 while on acute floor  Current Treatment Recommendations: Functional Mobility Training, Positioning, Home Exercise Program  Safety Devices  Type of devices:  All fall risk precautions in place, Call light within reach, Bed alarm in place, Left in chair, Nurse notified(Cecelia)  AM-PAC Score  AM-PAC Inpatient Mobility Raw Score : 18 (08/13/20 1405)  AM-PAC Inpatient T-Scale Score : 43.63 (08/13/20 1405)  Mobility Inpatient CMS 0-100% Score: 46.58 (08/13/20 1405)  Mobility Inpatient CMS G-Code Modifier : CK (08/13/20 1405)  Goals  Short term goals  Time Frame for Short term goals: 1-2 days  Short term goal 1: bed mobility at supervision  Short term goal 2: transfers at supervision  Short term goal 3: ambulation at supervision wbat with knee immobilizer on using rolling walker for household distances   -steps needed for home entry at [address] [place], [place] [address]cent term goal 4:

## 2020-08-13 NOTE — PROGRESS NOTES
Infectious Disease Follow up Notes  Admit Date: 8/12/2020  Hospital Day: 2    Antibiotics :   IV Daptomycin  IV Cefepime     CHIEF COMPLAINT:       Rt TKA  knee infection  S/p spacer placement     Subjective interval History :  53 y.o. man with significant history for Gastric by pass surgery with complicated course intestinal obstruction and surgeries and drainage and also had Left Nephrectomy for a tumor found during surgery in the past and pt has lost significant wt from the surgery and had Rt knee surgeries in the  Past underwent Rt TKA by Chyna Contreras on 12/19 and following this was admitted with Rt knee swelling and fluid collection was taken back to OR for ID and liner exchange by  on 1/22 and OP cx negative but one cx positive for Corynebacterium jeikeium from BROTH cx only he completed a course of iv abx and underwent repeat ID and wound vac placement by Chyna Contreras for delayed wound healing in Feb 2020 and he completed IV abx course and placed on oral Doxycyline suppression was seen in ID office in March 2020. He is now admitted for Rt knee resection arthroplasty and spacer placement by  pre op KNee FLUID aspiration on 8/12 was abnormal and we are consulted for recommendations.  He is very sleepy post op and unable to give much details.      Rt knee pain and s/p PICC line and OPAT d/w pt and OR cx in process he is familiar with IV abx       Past Medical History:    Past Medical History:   Diagnosis Date    Alcoholism ([facility] [address]     last drink 2015    Allergic migraine with status migrainosus     Allergic rhinitis, seasonal     Anemia     Arthritis     right knee    Vaughn's esophagus     Benign intracranial hypertension     Cancer (HCC)     renal     Carpal tunnel syndrome     Chronic pain     Depression     Depression     GERD (gastroesophageal reflux disease)     Headache(784.0)     History of blood 10/04/2012    Influenza, Intradermal, Preservative free 10/28/2014    Influenza, Intradermal, Quadrivalent, Preservative Free 12/13/2016    Influenza, Quadv, IM, PF (6 mo and older Fluzone, Flulaval, Fluarix, and 3 yrs and older Afluria) 12/18/2019    Influenza, Celestia Loron, Recombinant, IM PF (Flublok 18 yrs and older) 10/15/2018    Meningococcal ACWY Vaccine 01/15/2009    Meningococcal MCV4O (Menveo) 08/10/2019, 10/30/2019    Meningococcal MCV4P (Menactra) 02/09/2015, 08/17/2019, 10/30/2019    Meningococcal MPSV4 (Menomune) 01/15/2009    Pneumococcal Conjugate 13-valent (Awethhy07) 02/09/2015    Pneumococcal Polysaccharide ([id number]) 01/15/2009, 10/09/2013, 08/09/2019    Td, unspecified formulation 01/01/2007    Tdap (Boostrix, Adacel) 01/01/2007, 05/03/2014       Social History:    Social History     Tobacco Use    Smoking status: Former Smoker     Packs/day: 0.50     Years: 25.00     Pack years: 12.50     Types: Cigarettes     Last attempt to quit: 7/31/2017     Years since quitting: 3.0    Smokeless tobacco: Never Used   Substance Use Topics    Alcohol use: No     Alcohol/week: 0.0 standard drinks     Comment: last drink 2015    Drug use: No     Social History     Tobacco Use   Smoking Status Former Smoker    Packs/day: 0.50    Years: 25.00    Pack years: 12.50    Types: Cigarettes    Last attempt to quit: 7/31/2017    Years since quitting: 3.0   Smokeless Tobacco Never Used      Family History   Problem Relation Age of Onset    Cancer Father         Lymphoma    Arthritis Mother     Dementia Other     Diabetes Other     Cancer Other     Diabetes Maternal Uncle     Heart Disease Maternal Uncle     Depression Other     Heart Disease Maternal Uncle       REVIEW OF SYSTEMS:    No fever / chills / sweats. No weight loss. No visual change, eye pain, eye discharge. No oral lesion, sore throat, dysphagia.   Denies cough / sputum/Sob   Denies chest pain, palpitations/ dizziness  Denies nausea/ vomiting/abdominal pain/diarrhea. Denies dysuria or change in urinary function. Denies joint swelling or pain. No myalgia, arthralgia. No rashes, skin lesions   Denies focal weakness, sensory change or other neurologic symptoms  No lymph node swelling or tenderness.     RT KNEE pain, swelling and infection    PHYSICAL EXAM:      Vitals:    /61   Pulse 67   Temp 98.2 °F (36.8 °C) (Oral)   Resp 16   Ht 6' (1.829 m)   Wt 233 lb 14.5 oz (106.1 kg)   SpO2 96%   BMI 31.72 kg/m²     General Appearance: awake alert and ,in no acute distress, +  pallor, no icterus   Skin: warm and dry, no rash or erythema  Head: normocephalic and atraumatic  Eyes: pupils equal, round, and reactive to light, conjunctivae normal  ENT: tympanic membrane, external ear and ear canal normal bilaterally, nose without deformity, nasal mucosa and turbinates normal without polyps  Neck: supple and non-tender without mass, no thyromegaly  no cervical lymphadenopathy  Pulmonary/Chest: clear to auscultation bilaterally- no wheezes, rales or rhonchi, normal air movement, no respiratory distress  Cardiovascular: normal rate, regular rhythm, normal S1 and S2, no murmurs, rubs, clicks, or gallops, no carotid bruits  Abdomen: soft, non-tender, non-distended, normal bowel sounds, no masses or organomegaly  Extremities: no cyanosis, clubbing or edema  Musculoskeletal: normal range of motion, no joint swelling, deformity or tenderness  Neurologic: reflexes normal and symmetric, no cranial nerve deficit  Lines: PICC  Rt knee brace and post op dressing+          Data Review:    Lab Results   Component Value Date    WBC 13.5 (H) 08/13/2020    HGB 12.1 (L) 08/13/2020    HCT 37.2 (L) 08/13/2020    MCV 87.1 08/13/2020     08/13/2020     Lab Results   Component Value Date    CREATININE 0.9 08/13/2020    BUN 7 08/13/2020     08/13/2020    K 4.6 08/13/2020     08/13/2020    CO2 23 08/13/2020       Hepatic Function Panel:   Lab Results   Component Value Date    ALKPHOS 110 08/13/2020    ALT 7 08/13/2020    AST 10 08/13/2020    PROT 6.3 08/13/2020    PROT 6.7 01/07/2015    BILITOT 0.3 08/13/2020    BILIDIR <0.2 02/18/2015    IBILI 0.3 02/18/2015    LABALBU 3.8 08/13/2020       UA:  Lab Results   Component Value Date    COLORU YELLOW 08/06/2020    CLARITYU Clear 08/06/2020    GLUCOSEU Negative 08/06/2020    GLUCOSEU NEGATIVE 02/07/2011    BILIRUBINUR Negative 08/06/2020    BILIRUBINUR NEGATIVE 02/07/2011    KETUA Negative 08/06/2020    SPECGRAV 1.006 08/06/2020    BLOODU Negative 08/06/2020    PHUR 6.0 08/06/2020    PROTEINU Negative 08/06/2020    UROBILINOGEN 1.0 08/06/2020    NITRU Negative 08/06/2020    LEUKOCYTESUR TRACE 08/06/2020    LABMICR YES 08/06/2020    URINETYPE NotGiven 08/06/2020      Urine Microscopic:   Lab Results   Component Value Date    HYALCAST 0 08/06/2020    WBCUA 1 08/06/2020    RBCUA 0 08/06/2020    EPIU 0 08/06/2020       MICRO: cultures reviewed and updated by me /20 [id number]        Order Status: Completed  Specimen: Joint/Joint Fluid from Joint, Knee  Updated: 08/13/20 1249     Gram Stain Result  1+ WBC's (Mononuclear)   No Epithelial Cells seen   No organisms seen     Culture Surgical  No growth to date    Narrative:      ORDER#: [id number]                          ORDERED BY: Kath Quezada   SOURCE: Knee                               COLLECTED:  08/12/20 13:52   ANTIBIOTICS AT JAX. :                      RECEIVED :  08/12/20 14:26   Performed at:   [facility]   [address] [facility] [address] [facility] [address]   Phone [phone]    [place], [place] [[id number]]   Collected: 08/12/20 7068    Order Status: Completed  Specimen: Joint/Joint Fluid from Joint, Knee  Updated: 08/13/20 1249     Gram Stain Result  1+ WBC's (Polymorphonuclear)   No Epithelial Cells seen   No organisms seen     Culture, Joint Aerobic  No growth to date    Narrative:      ORDER#: [id number]                          ORDERED BY: Joshua Mohan   SOURCE: Knee                               COLLECTED:  08/12/20 13:38   ANTIBIOTICS AT JAX. :                      RECEIVED :  08/12/20 13:59   Performed at:   [facility]   [address] [facility] [email or web address]   Phone [phone]    Culture, Surgical [[id number]]   Collected: 08/12/20 1338    Order Status: Completed  Specimen: Joint/Joint Fluid from Joint, Knee  Updated: 08/13/20 1249     Gram Stain Result  2+ WBC's (Polymorphonuclear)   No Epithelial Cells seen   No organisms seen     Culture Surgical  No growth to date    Narrative:      ORDER#: [id number]                          ORDERED BY: Joshua Mohan   SOURCE: Knee                               COLLECTED:  08/12/20 13:38   ANTIBIOTICS AT JAX. :                      RECEIVED :  08/12/20 13:59   Performed at:   [facility]   [address] [facility] [address]   Phone [phone]    Culture with Smear, Acid Fast Bacillius [[id number]]      Order Status: Sent  Specimen: Joint, Knee     Culture, Fungus [[id number]]      Order Status: Sent  Specimen: Joint, Knee     Culture, Aerorbic and Isaura Feller Joint [[id number]]   Collected: 08/12/20 1352    Order Status: Canceled  Specimen: Joint/Joint Fluid from Joint, Knee         IMAGING:    XR KNEE RIGHT (1-2 VIEWS)   Final Result   Postsurgical changes above               All the pertinent images and reports for the current Hospitalization were reviewed by me     Scheduled Meds:   acetaminophen  1,000 mg Oral TID WC    aspirin  81 mg Oral Daily    atorvastatin  40 mg Oral Daily    calcium-vitamin D  1 tablet Oral BID    FLUoxetine  20 mg Oral Daily    multivitamin  1 tablet Oral Daily    pantoprazole  40 mg Oral BID    traZODone  200 mg Oral Nightly    sodium chloride flush  10 mL Intravenous 2 times per day    sennosides-docusate sodium  1 tablet Oral BID    enoxaparin  30 mg Subcutaneous BID    lidocaine 1 % injection  5 mL Intradermal Once    daptomycin (CUBICIN) IVPB  600 mg Intravenous Q24H    cefepime  2 g Intravenous Q12H       Continuous Infusions:   sodium chloride 100 mL/hr at 08/13/20 0453       PRN Meds:  HYDROmorphone, dicyclomine, zolpidem, acetaminophen, oxyCODONE **OR** oxyCODONE, morphine, magnesium hydroxide, ondansetron, sodium chloride flush      Assessment:     Patient Active Problem List   Diagnosis    Insomnia-chronic intermittent--uses prn ambien--to be filled by dr Valerie Zamora (sleep dr)   Adilene Simple daily ppi-last egd 5/15--dr Muna Parekh History of tobacco useadvised to quit- quit 7/1/2014    Allergic rhinitis, seasonal    Obstructive sleep apnea,Severe -(on cpap)    Class 1 obesity due to excess calories with body mass index (BMI) of 34.0 to 34.9 in adult    Depression-on daily effexor xr--sees dr Juarez Rater    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  Chondromalacia of patellofemoral joint    Chronic pain syndrome    History of stroke    Gastroesophageal reflux disease with esophagitis--on daily ppi    Intractable chronic migraine without aura and with status migrainosus    Iron deficiency anemia    B12 deficiency    Anastomotic ulcer    Malignant neoplasm of left kidney (HCC) clear cell. 8/1/18 Dr Dacia Corado.     Total knee replacement status, right    Failed total knee, right, initial encounter ([address]    Infection of total knee replacement ([address]    History of depression    History of alcoholism ([address]    Receiving intravenous antibiotic treatment as outpatient    SBO (small bowel obstruction) ([address]    Partial small bowel obstruction ([address]    Infection of prosthetic right knee joint ([address]     Rt TKA infection  S/p Rt KNEE resection arthroplasty and spacer placement on 8/12  Pre op Knee fluid aspiration very abnormal  H/o Rt TKA ID and wound vac placement for delayed healing in Feb 2020 and cx negative  Rt TKA s/p ID and wound cx Broth cx Corynebacterium in the past  Index Rt TKA surgery on Dec 2019  H/O Multiple abdominal surgery for SBO  H/o Gastric bypass surgery   H/o Splenectomy and Nephrectomy      S/p Rt knee explanted and spacer in place will follow OR cx and adjust IV abx   cx may be affected by prior abx use    picc PLACED for IV abx and awaiting OR cx to adjust anticipate x 6 weeks of iv ABX THERAPY     Labs, Microbiology, Radiology and all the pertinent results from current hospitalization and  care every where were reviewed  by me as a part of the evaluation   Plan:   1. IV Daptomycin x 600 mg x Q 24 HRS  due to single Kidney avoiding Vancomycin  2. IV Cefepime x 2 gm q 12 hrs  3. ESR 5 , CRP 6.5   4. Add Fungal and AFB cx on the OR tissue/fluid   5. Will need x 6 weeks of IV abx and PICC line placement   6. opat D/W PT     Discussed with patient/Family and Nursing   Risk of Complications/Morbidity: High      · Illness(es)/ Infection present that pose threat to bodily function. · There is potential for severe exacerbation of infection/side effects of treatment. Therapy requires intensive monitoring for antimicrobial agent toxicity     Discussed with patient/Family and Nursing staff     Thanks for allowing me to participate in your patient's care and please call me with any questions or concerns.     Jared Cantu MD  Infectious Disease  [facility]) Physician  Phone: [phone]   Fax : [phone]

## 2020-08-13 NOTE — PROGRESS NOTES
Final  08/12/2020  1:38 PM  [address] [facility]    Clot Eval.  see below   Final  08/12/2020  1:38 PM  [address] [facility]    No Clots Seen    Alverto Harrism, Fluid  20,808  /cumm  Final  08/12/2020  1:38 PM  [address] [facility]    [facility], Fluid  441  /cumm  Final  08/12/2020  1:38 PM  [address] [facility]    Neutrophil Count, Fluid  93  %  Final  08/12/2020  1:38 PM  [address] [facility]    Lymphocytes, Body Fluid  2  %  Final  08/12/2020  1:38 PM  [facility]    Monocyte Count, Fluid  5  %  Final  08/12/2020  1:38 PM  [address] [facility]    Number of Cells Counted Fluid  100   Final  08/12/2020  1:38 PM  [address] [facility]    Volume  5.0  mL  Final              Current Inpatient Medications             Current Facility-Administered Medications: acetaminophen (TYLENOL) tablet 1,000 mg, 1,000 mg, Oral, TID WC  aspirin chewable tablet 81 mg, 81 mg, Oral, Daily  atorvastatin (LIPITOR) tablet 40 mg, 40 mg, Oral, Daily  calcium-vitamin D 500-200 MG-UNIT per tablet 1 tablet, 1 tablet, Oral, BID  dicyclomine (BENTYL) capsule 10 mg, 10 mg, Oral, Q6H PRN  FLUoxetine (PROZAC) capsule 20 mg, 20 mg, Oral, Daily  multivitamin 1 tablet, 1 tablet, Oral, Daily  pantoprazole (PROTONIX) tablet 40 mg, 40 mg, Oral, BID  traZODone (DESYREL) tablet 200 mg, 200 mg, Oral, Nightly  zolpidem (AMBIEN) tablet 10 mg, 10 mg, Oral, Nightly PRN  0.45 % sodium chloride infusion, , Intravenous, Continuous  sodium chloride flush 0.9 % injection 10 mL, 10 mL, Intravenous, 2 times per day  acetaminophen (TYLENOL) tablet 650 mg, 650 mg, Oral, Q4H PRN  oxyCODONE (ROXICODONE) immediate release tablet 5 mg, 5 mg, Oral, Q4H PRN **OR** oxyCODONE HCl (OXY-IR) immediate release tablet 10 mg, 10 mg, Oral, Q4H PRN  morphine (PF) injection 4 mg, 4 mg, Intravenous, Q3H PRN  sennosides-docusate sodium (SENOKOT-S) 8.6-50 MG tablet 1 tablet, 1 tablet, Oral, BID  magnesium hydroxide (MILK OF MAGNESIA) 400 MG/5ML suspension 30 mL, 30 mL, Oral, Daily PRN  ondansetron (ZOFRAN) injection 4 mg, 4 mg, Intravenous, Q6H PRN  enoxaparin (LOVENOX) injection 30 mg, 30 mg, Subcutaneous, BID  lidocaine PF 1 % injection 5 mL, 5 mL, Intradermal, Once  sodium chloride flush 0.9 % injection 10 mL, 10 mL, Intravenous, PRN  DAPTOmycin (CUBICIN) 600 mg in sodium chloride 0.9 % 50 mL IVPB, 600 mg, Intravenous, Q24H  cefepime (MAXIPIME) 2 g IVPB minibag, 2 g, Intravenous, Q12H    ASSESSMENT AND PLAN    Post right TKA resection with spacer placement  antibx as ordered for right TKA infection. ID consulted. PICC line ordered. Wound cx pending final report. Nephrology to see  DVT prophylaxis ordered, Lovenox as inpt then switch to ASA 81mg twice at day for 14 days for DVT prophylaxis  PT OT for ADL's and ambulation as tolerated righ tknee in immobilizer and WBAT  SS for DC planning, home with home care for IV antibx planned.    IV or PO pain med as ordered         [facility]  8/13/2020  9:18 AM

## 2020-08-13 NOTE — PLAN OF CARE
Problem: Mood - Altered:  Goal: Mood stable  Description: Mood stable  8/13/2020 1015 by Eulalio Meade RN  Outcome: Ongoing  Note: Pt mood seems to be positive today, pt hopes to go home today or tomorrow, will continue to monitor and assess. Electronically signed by David Mcgovern RN on 8/13/2020 at 10:16 AM    8/12/2020 2158 by Regina Young RN  Note: Pt will remain stable this shift.

## 2020-08-13 NOTE — CARE COORDINATION
INITIAL CASE MANAGEMENT ASSESSMENT    Reviewed chart, met with patient to assess possible discharge needs. Explained Case Management role/services. Living Situation: Patient lives with spouse, mother and children. 4 steps to enter home. ADLs: patient was able to manage his own adls prior to admission     DME: cane, walker, shower chair    PT/OT Recs: pending     Active Services: [facility] and would [facility] this [facility]. Patient stated he would need IV antibiotics at home. Transportation: active ; spouse will transport home     Medications: takes on his own    PCP: Kristyn Schafer MD    PLAN/COMMENTS: Patient plans to return home. He states he will need IV antibiotics at home. He prefers [facility]. Sw made referral to [employer] to check infusion benefits. Sw made referral to [facility] liaison. SW/CM provided contact information for patient or family to call with any questions. SW/CM will follow and assist as needed.     Electronically signed by Shabnam Lamb on 8/13/2020 at 11:42 AM

## 2020-08-13 NOTE — PLAN OF CARE
remain free from falls  8/12/2020 2146 by Amauri Benton RN  Note: Pt remains free from falls this shift. Fall precautions in place. Will continue to monitor. 8/12/2020 1814 by Jammie Birmingham RN  Outcome: Ongoing  Note: Pt free from injury or falls at this time, fall precautions in place, bed in low position, side rail up x2, [place] Fall Risk: High (45 and higher), bed alarm on, reoriented to room and call light, reminded not to get up without assistance, call light in reach, will continue to monitor. Pt verbalizes understanding of fall risk procedures. Goal: Absence of physical injury  Description: Absence of physical injury  8/12/2020 1814 by Jammie Birmingham RN  Outcome: Ongoing  Note: Pt has not incurred any type of physical injury this shift.

## 2020-08-13 NOTE — CARE COORDINATION
Referral received to check IV Benefits. Information faxed to [employer] office awaiting call back on costs. Therapy:Daptomycin 600mg Q24, Cefepime 2g Q12  Deductible:$300   Amt Met:$300  Co-Insurance %:80/20  OOP:$1500    Amt Met:$1500    Pt.  Copay:$0 for all drugs    Electronically signed by Shilo Feldman on 8/13/2020 at 12:56 PM   Cell Ph# [phone], Office # [phone]

## 2020-08-13 NOTE — PROGRESS NOTES
Clinical Pharmacy Note  Medication Counseling    Reviewed new medications started during hospital admission: Daptomycin, Cefepime, Lovenox, Oxycodone. Indications and side effects were emphasized during counseling. All medication-related questions addressed. Patient verbalized understanding of education. Should the patient express any additional questions or concerns regarding their medications, please do not hesitate to contact the pharmacy department. Patient/caregiver aware they may refuse medications during hospital stay. 20 minutes spent educating patient regarding medications.

## 2020-08-13 NOTE — OP NOTE
we felt that it was necessary to go and do  full resection arthroplasty to treat for deep periprosthetic infection  and then considered revision knee sometime in the future when he has  cleared his infection. OPERATIVE PROCEDURE:  The patient was brought to the operating room. Once anesthetic was obtained and intravenous antibiotics delivered, his  right knee and foot were prepped and draped in a sterile fashion. The  leg was exsanguinated. Tourniquet was placed to 300 mmHg around the  thigh. Using a double-knife technique, the anterior wound was completely  excised. Then, dissection was carried down to the extensor mechanism. An 18-gauge needle was placed into the knee joint and cloudy fluid was  removed. This was sent for cell count, culture and Gram stain. Next,  the parapatellar arthrotomy was performed, and the knee was formally  opened. A wide synovectomy was performed of the suprapatellar pouch and  medial and lateral gutters. Part of this tissue was sent for culture  and pathologic evaluation. Next, the polyethylene was removed and then using standard revision  techniques, the distal femur and proximal tibia were removed along with  all of the cement that accompanied them. Finally, the cemented patella  was removed and all of the plastic and the cement with that. After  this, the knee was irrigated with 6 L of antibiotic solution and then  reconstruction began. Instruments were changed and the knee was  recovered. At this point then, the knee was measured to accept a #F  femoral component and a 17-mm poly spacer size 6. These two were placed  together in trial form and the knee came to full extension, had very  good flexion and otherwise was as stable as it could be. We again  removed these and again washed the knee out.   Then, mixed the antibiotic  cement on the back table and then in the order of tibia and femur, the  #6, 17-mm tibial poly and #F femur cruciate-retaining femoral component  was cemented on distal end of the femur. Once the cement had hardened,  the knee was taken through a very stable range of motion. It was bathed  for five minutes with 3 gm of tranexamic acid, injected with 100 mL of  ropivacaine, morphine, cefuroxime, and methylprednisolone, and finally  bathed with aqueous iodine. The wound was closed in layers. A dressing  was applied. The patient was placed in a knee immobilizer and brought  to the recovery room in good condition. CHELLY Galvan MD    D: 08/12/2020 15:10:10       T: 08/12/2020 16:13:19     [id number]  Job#: [id number]     Doc#: [id number]    CC:

## 2020-08-13 NOTE — PROGRESS NOTES
Met with patient and family at bedside, patient is in bed alert and orientedx4. discussed role of nurse navigator and gave contact information. Reviewed reasons to call with questions or concerns, importance of TEDS, Incentive spirometer, pain medication, and physical and occupational therapy. 2/4 bed rails up, bed in lowest position, fall precautions in place, call light within reach. Pulses present bilaterally +2 pedal, no drainage or odor noted at surgical dressing right knee. Dry gauze  Intact with moderate old drainage noted. Ice and immobilizer in place. Luca and scds on LLE. Neurovascular checks performed and WNLs, patient denies numbness or tingling. DC [facility] when released by infectious disease and ortho. wife to transport patient.   DME needs:axel Pineda  Orthopedic Nurse Navigator  Phone number: [phone]    Future Appointments   Date Time Provider Timothy Lian   8/27/2020 10:30 AM MD Eva Brewer   9/11/2020 12:00 PM LILLIE Sterling CNP [facility]     Electronically signed by Joanne Grimm RN on 8/13/2020 at 2:31 PM

## 2020-08-13 NOTE — PROGRESS NOTES
Called [facility] and placed order for PICC line, consent signed and checklist complete, pt agreeable. Dr Duarte Precise nephrology called this nurse this morning and confirmed patient ok for PICC line from nephro standpoint.  Electronically signed by Shikha Thompson RN on 8/13/2020 at 10:08 AM

## 2020-08-13 NOTE — PROGRESS NOTES
RN spoke with Dr. Bernadine Bledsoe regarding PICC placement for patient. He states that he needs current labs and will see him in the AM before PICC can be placed. PICC nurse will be made aware when she calls back. Orders placed.      ACKNOWLEDGED by PICC RN Flaco MACKENZIE RN

## 2020-08-13 NOTE — PROGRESS NOTES
Occupational Therapy   Occupational Therapy Initial Assessment  Date: 2020   Patient Name: Robe Mahan  MRN: [id number]     : 1970    Date of Service: 2020    Assessment: Pt is 53 y.o. M who presents with infection of prosthestic R knee joint. Pt is s/p R arthroplasty resection with insertion of spacer. Pt with original R TKA in , Revision in 2019, I&D and polyexchange in 2020, and in Feb had I& D and wound vac placement. PTA pt lives with wife and kids in one story home with 3 TONIA. Pt reports independence in self-care tasks, shares homemaking responsibilities with wife, and has been completing functional mobility recently with cane d/t pain. Currently, pt presents with ROM/strength in [facility] for self-care and transfers. Pt completed bed mobility with SBA and sit <> stand transfers with SBA. Pt completed functional mobility with RW with SBA, steady with no overt LOB. Pt completed toileting and hand washing with SBA. Anticipate pt safe to d/c home with  supervision/assist and home health OT. Discharge Recommendations:  24 hour supervision or assist, Home with Home health [address]: LEVEL 1 STANDARD    - Initial home health evaluation to occur within 24-48 hours, in patient home   - Therapy to evaluate with goal of regaining prior level of functioning   - Therapy to evaluate if patient has [address] needs for personal care    Assessment   Performance deficits / Impairments: Decreased functional mobility ; Decreased balance;Decreased ADL status; Decreased endurance;Decreased strength  Assessment: Pt is 53 y.o. M who presents with infection of prosthestic R knee joint. Pt is s/p R arthroplasty resection with insertion of spacer. Pt with original R TKA in , Revision in 2019, I&D and polyexchange in 2020, and in Feb had I& D and wound vac placement. PTA pt lives with wife and kids in one story home with 3 TONIA.  Pt reports independence in self-care tasks, shares homemaking responsibilities with wife, and has been completing functional mobility recently with cane d/t pain. Currently, pt presents with ROM/strength in [facility] for self-care and transfers. Pt completed bed mobility with SBA and sit <> stand transfers with SBA. Pt completed functional mobility with RW with SBA, steady with no overt LOB. Pt completed toileting and hand washing with SBA. Anticipate pt safe to d/c home with 24/7 supervision/assist and home health OT. Prognosis: Fair  Decision Making: Medium Complexity  History: PMH: CVA, alcoholism, depression, chronic pain  Exam: ADLs, transfers, func mob, bed mob  Assistance / Modification: SBA for mobility and ADLs  OT Education: Transfer Training;OT Role;Plan of Care;Precautions; ADL Adaptive Strategies  REQUIRES OT FOLLOW UP: Yes  Activity Tolerance  Activity Tolerance: Patient Tolerated treatment well  Safety Devices  Safety Devices in place: Yes  Type of devices: Nurse notified;Gait belt; Chair alarm in place; Bed alarm in place;Call light within reach; Left in bed           Patient Diagnosis(es): The encounter diagnosis was Infection associated with internal right knee prosthesis, initial encounter ([facility] [address] has a past medical history of Alcoholism ([address] Allergic migraine with status migrainosus, Allergic rhinitis, seasonal, Anemia, Arthritis, Vaughn's esophagus, Benign intracranial hypertension, Cancer ([address] Carpal tunnel syndrome, Chronic pain, Depression, Depression, GERD (gastroesophageal reflux disease), Headache(784.0), History of blood transfusion, History of tobacco use, Migraine, Morbid obesity ([address] Movement disorder, Onychomycosis, Sleep apnea, obstructive, Unspecified cerebral artery occlusion with cerebral infarction, Wears dentures, and Wears glasses. has a past surgical history that includes Gastric bypass surgery (Jan 2009); Splenectomy; LASIK (2005); knee surgery (July 2012);  Carpal tunnel release; laparotomy; Knee arthroscopy (Right, 7/11/2013); Knee arthroscopy (Right, 7/11/12); Total knee arthroplasty (10/15/13); Endoscopy, colon, diagnostic; Dilatation, esophagus; eye surgery; joint replacement; Abdominal exploration surgery (1/11/16); other surgical history (Left, 03/08/2016); hernia repair (3-); Upper gastrointestinal endoscopy (6-7-2016); other surgical history (2016); Upper gastrointestinal endoscopy (04/02/2018); Kidney removal (Left, 08/01/2018); Abdomen surgery; Abdomen surgery (4-7-2016); Revision total knee arthroplasty (Right, 12/17/2019); Revision total knee arthroplasty (Right, 1/22/2020); knee surgery (Right, 2/18/2020); and Total knee arthroplasty (Right, 8/12/2020). Restrictions  Restrictions/Precautions  Restrictions/Precautions: Fall Risk, Weight Bearing  Lower Extremity Weight Bearing Restrictions  Right Lower Extremity Weight Bearing: Weight Bearing As Tolerated  Position Activity Restriction  Other position/activity restrictions: knee immobilizer   wbat    Subjective   General  Chart Reviewed: Yes  Patient assessed for rehabilitation services?: Yes  Additional Pertinent Hx: Pt is 53 y.o. M who presents with infection of prosthestic R knee joint. Pt is s/p R arthroplasty resection with insertion of spacer. Pt with original R TKA in 2013, Revision in 12/2019, I&D and polyexchange in Jan 2020, and in Feb had I& D and wound vac placement.  PMH: CVA, alcoholism, depression, chronic pain  Family / Caregiver Present: Yes(Wife)  Referring Practitioner: Stella Stringer MD  Diagnosis: Infection of prosthestic R knee joint  Patient Currently in Pain: (at rest is moderate   - states \"not as bad as last time\" with weight bearing)  Vital Signs  Patient Currently in Pain: (at rest is moderate   - states \"not as bad as last time\" with weight bearing)     Social/Functional History  Social/Functional History  Lives With: Spouse, Son, Daughter  Type of Home: House  Home Layout: One level, Able to Live on AROM (degrees)  RUE AROM : WFL  Right Hand AROM (degrees)  Right Hand AROM: WFL     LUE Strength  Gross LUE Strength: WFL  RUE Strength  Gross RUE Strength: WFL          Plan   Plan  Times per week: 3-5  Current Treatment Recommendations: Strengthening, Endurance Training, Balance Training, Functional Mobility Training, Safety Education & Training, Equipment Evaluation, Education, & procurement, Patient/Caregiver Education & Training, Self-Care / ADL      AM-[facility] Score    Katy Shafer scored a 20/24 on the -[facility] ADL Inpatient form. Current research shows that an AM-PAC score of 18 or greater is typically associated with a discharge to the patient's home setting. Based on the patient's AM-PAC score, and their current ADL deficits, it is recommended that the patient have 2-3 sessions per week of Occupational Therapy at d/c to increase the patient's independence. At this time, this patient demonstrates the endurance and safety to discharge home with home health OT and a follow up treatment frequency of 2-3x/wk. Please see assessment section for further patient specific details. If patient discharges prior to next session this note will serve as a discharge summary. Please see below for the latest assessment towards goals.         AM-[facility] Inpatient Daily Activity Raw Score: 20 (08/13/20 1543)  -PAC Inpatient ADL T-Scale Score : 42.03 (08/13/20 1543)  ADL Inpatient CMS 0-100% Score: 38.32 (08/13/20 1543)  ADL Inpatient CMS G-Code Modifier : CJ (08/13/20 1543)    Goals  Short term goals  Time Frame for Short term goals: prior to d/c  Short term goal 1: Pt will complete functional mobility and transfers with SPV  Short term goal 2: Pt will complete bathing with SPV  Short term goal 3: Pt will complete dressing with SPV  Short term goal 4: Pt will complete toileting with SPV  Patient Goals   Patient goals : \"to go home\"       Therapy Time   Individual Concurrent Group Co-treatment   Time In       1320   Time Out 1345   Minutes       25   Timed Code Treatment Minutes: 10 Minutes(15 min eval)     If pt is discharged prior to next OT session, this note will serve as the discharge summary.     KADEN Paige/THERESE#[id number]

## 2020-08-14 ENCOUNTER — TELEPHONE (OUTPATIENT)
Dept: ORTHOPEDIC SURGERY | Age: 50
End: 2020-08-14

## 2020-08-14 VITALS
WEIGHT: 236.55 LBS | HEART RATE: 68 BPM | RESPIRATION RATE: 12 BRPM | OXYGEN SATURATION: 95 % | TEMPERATURE: 98 F | SYSTOLIC BLOOD PRESSURE: 113 MMHG | BODY MASS INDEX: 32.04 KG/M2 | DIASTOLIC BLOOD PRESSURE: 67 MMHG | HEIGHT: 72 IN

## 2020-08-14 PROCEDURE — 99233 SBSQ HOSP IP/OBS HIGH 50: CPT | Performed by: INTERNAL MEDICINE

## 2020-08-14 PROCEDURE — 6360000002 HC RX W HCPCS: Performed by: INTERNAL MEDICINE

## 2020-08-14 PROCEDURE — 97535 SELF CARE MNGMENT TRAINING: CPT

## 2020-08-14 PROCEDURE — 2580000003 HC RX 258: Performed by: ORTHOPAEDIC SURGERY

## 2020-08-14 PROCEDURE — 6370000000 HC RX 637 (ALT 250 FOR IP): Performed by: ORTHOPAEDIC SURGERY

## 2020-08-14 PROCEDURE — 2580000003 HC RX 258: Performed by: INTERNAL MEDICINE

## 2020-08-14 PROCEDURE — 6360000002 HC RX W HCPCS: Performed by: NURSE PRACTITIONER

## 2020-08-14 PROCEDURE — 94760 N-INVAS EAR/PLS OXIMETRY 1: CPT

## 2020-08-14 PROCEDURE — 97530 THERAPEUTIC ACTIVITIES: CPT

## 2020-08-14 RX ADMIN — ATORVASTATIN CALCIUM 40 MG: 40 TABLET, FILM COATED ORAL at 09:39

## 2020-08-14 RX ADMIN — HYDROMORPHONE HYDROCHLORIDE 1 MG: 1 INJECTION, SOLUTION INTRAMUSCULAR; INTRAVENOUS; SUBCUTANEOUS at 16:11

## 2020-08-14 RX ADMIN — OXYCODONE HYDROCHLORIDE 10 MG: 10 TABLET ORAL at 06:38

## 2020-08-14 RX ADMIN — PANTOPRAZOLE SODIUM 40 MG: 40 TABLET, DELAYED RELEASE ORAL at 09:40

## 2020-08-14 RX ADMIN — CEFEPIME HYDROCHLORIDE 2 G: 2 INJECTION, POWDER, FOR SOLUTION INTRAVENOUS at 16:11

## 2020-08-14 RX ADMIN — OXYCODONE HYDROCHLORIDE 10 MG: 10 TABLET ORAL at 02:38

## 2020-08-14 RX ADMIN — OYSTER SHELL CALCIUM WITH VITAMIN D 1 TABLET: 500; 200 TABLET, FILM COATED ORAL at 09:39

## 2020-08-14 RX ADMIN — Medication 10 ML: at 08:02

## 2020-08-14 RX ADMIN — ACETAMINOPHEN 1000 MG: 500 TABLET, FILM COATED ORAL at 09:42

## 2020-08-14 RX ADMIN — SODIUM CHLORIDE 600 MG: 9 INJECTION, SOLUTION INTRAVENOUS at 00:45

## 2020-08-14 RX ADMIN — DOCUSATE SODIUM 50 MG AND SENNOSIDES 8.6 MG 1 TABLET: 8.6; 5 TABLET, FILM COATED ORAL at 09:39

## 2020-08-14 RX ADMIN — OXYCODONE HYDROCHLORIDE 10 MG: 10 TABLET ORAL at 10:55

## 2020-08-14 RX ADMIN — CEFEPIME HYDROCHLORIDE 2 G: 2 INJECTION, POWDER, FOR SOLUTION INTRAVENOUS at 06:38

## 2020-08-14 RX ADMIN — ACETAMINOPHEN 1000 MG: 500 TABLET, FILM COATED ORAL at 16:11

## 2020-08-14 RX ADMIN — OXYCODONE HYDROCHLORIDE 10 MG: 10 TABLET ORAL at 14:42

## 2020-08-14 RX ADMIN — HYDROMORPHONE HYDROCHLORIDE 1 MG: 1 INJECTION, SOLUTION INTRAMUSCULAR; INTRAVENOUS; SUBCUTANEOUS at 08:01

## 2020-08-14 RX ADMIN — THERA TABS 1 TABLET: TAB at 09:39

## 2020-08-14 RX ADMIN — ACETAMINOPHEN 1000 MG: 500 TABLET, FILM COATED ORAL at 12:45

## 2020-08-14 RX ADMIN — FLUOXETINE 20 MG: 20 CAPSULE ORAL at 09:39

## 2020-08-14 ASSESSMENT — PAIN DESCRIPTION - ORIENTATION
ORIENTATION: RIGHT

## 2020-08-14 ASSESSMENT — PAIN DESCRIPTION - ONSET
ONSET: ON-GOING

## 2020-08-14 ASSESSMENT — PAIN SCALES - GENERAL
PAINLEVEL_OUTOF10: 5
PAINLEVEL_OUTOF10: 7
PAINLEVEL_OUTOF10: 8
PAINLEVEL_OUTOF10: 7
PAINLEVEL_OUTOF10: 0
PAINLEVEL_OUTOF10: 8
PAINLEVEL_OUTOF10: 7
PAINLEVEL_OUTOF10: 5
PAINLEVEL_OUTOF10: 8
PAINLEVEL_OUTOF10: 0
PAINLEVEL_OUTOF10: 7
PAINLEVEL_OUTOF10: 4
PAINLEVEL_OUTOF10: 7
PAINLEVEL_OUTOF10: 0
PAINLEVEL_OUTOF10: 7
PAINLEVEL_OUTOF10: 8

## 2020-08-14 ASSESSMENT — PAIN DESCRIPTION - DESCRIPTORS
DESCRIPTORS: ACHING

## 2020-08-14 ASSESSMENT — PAIN SCALES - WONG BAKER
WONGBAKER_NUMERICALRESPONSE: 0

## 2020-08-14 ASSESSMENT — PAIN DESCRIPTION - LOCATION
LOCATION: KNEE

## 2020-08-14 ASSESSMENT — PAIN DESCRIPTION - PAIN TYPE
TYPE: SURGICAL PAIN

## 2020-08-14 ASSESSMENT — PAIN DESCRIPTION - PROGRESSION
CLINICAL_PROGRESSION: GRADUALLY IMPROVING
CLINICAL_PROGRESSION: GRADUALLY WORSENING
CLINICAL_PROGRESSION: GRADUALLY WORSENING
CLINICAL_PROGRESSION: GRADUALLY IMPROVING
CLINICAL_PROGRESSION: GRADUALLY IMPROVING
CLINICAL_PROGRESSION: GRADUALLY WORSENING
CLINICAL_PROGRESSION: GRADUALLY IMPROVING
CLINICAL_PROGRESSION: GRADUALLY IMPROVING
CLINICAL_PROGRESSION: GRADUALLY WORSENING
CLINICAL_PROGRESSION: GRADUALLY IMPROVING
CLINICAL_PROGRESSION: GRADUALLY WORSENING
CLINICAL_PROGRESSION: GRADUALLY WORSENING
CLINICAL_PROGRESSION: GRADUALLY IMPROVING

## 2020-08-14 ASSESSMENT — PAIN - FUNCTIONAL ASSESSMENT
PAIN_FUNCTIONAL_ASSESSMENT: PREVENTS OR INTERFERES SOME ACTIVE ACTIVITIES AND ADLS

## 2020-08-14 ASSESSMENT — PAIN DESCRIPTION - FREQUENCY
FREQUENCY: CONTINUOUS

## 2020-08-14 NOTE — PROGRESS NOTES
Follow up appointment scheduled for patient per request from Dr. Leticia Sena for Monday, 8/17/2020 at 0800 with Dr. Leticia Sena at the [facility] location. Patient to be updated with this time and date prior to d/c today.

## 2020-08-14 NOTE — PROGRESS NOTES
Infectious Disease Follow up Notes  Admit Date: 8/12/2020  Hospital Day: 3    Antibiotics :   IV Daptomycin  IV Cefepime     CHIEF COMPLAINT:       Rt TKA  knee infection  S/p spacer placement     Subjective interval History :  53 y.o. man with significant history for Gastric by pass surgery with complicated course intestinal obstruction and surgeries and drainage and also had Left Nephrectomy for a tumor found during surgery in the past and pt has lost significant wt from the surgery and had Rt knee surgeries in the  Past underwent Rt TKA by Eddie Galloway on 12/19 and following this was admitted with Rt knee swelling and fluid collection was taken back to OR for ID and liner exchange by  on 1/22 and OP cx negative but one cx positive for Corynebacterium jeikeium from BROTH cx only he completed a course of iv abx and underwent repeat ID and wound vac placement by Eddie Galloway for delayed wound healing in Feb 2020 and he completed IV abx course and placed on oral Doxycyline suppression was seen in ID office in March 2020. He is now admitted for Rt knee resection arthroplasty and spacer placement by  pre op KNee FLUID aspiration on 8/12 was abnormal and we are consulted for recommendations.  He is very sleepy post op and unable to give much details.      Rt knee pain and s/p PICC line and OPAT d/w pt and OR cx in process has some bleeding and dressing reinforced and no fevers no chill        Past Medical History:    Past Medical History:   Diagnosis Date    Alcoholism ([facility] [address]     last drink 2015    Allergic migraine with status migrainosus     Allergic rhinitis, seasonal     Anemia     Arthritis     right knee    Vaughn's esophagus     Benign intracranial hypertension     Cancer (HCC)     renal     Carpal tunnel syndrome     Chronic pain     Depression     Depression     GERD (gastroesophageal reflux disease)     MIRELLA(678.4)     History of blood transfusion     History of tobacco use     Quit 7/2014    Migraine     chronic for 1 year    Morbid obesity ([address]     Movement disorder     Onychomycosis     Sleep apnea, obstructive     Severe uses cpap stop bang 6    Unspecified cerebral artery occlusion with cerebral infarction 2014    slight weakness in left arm    Wears dentures     full set    Wears glasses        Past Surgical History:    Past Surgical History:   Procedure Laterality Date    ABDOMEN SURGERY      gastric bypass    ABDOMEN SURGERY  4-7-2016    repair of recurrent incisional hernia with mesh, removal of old mesh, bilateral component separation    ABDOMINAL EXPLORATION SURGERY  1/11/16    exp lap, lysis of adhesions, small bowel resection    CARPAL TUNNEL RELEASE      bilat    DILATATION, ESOPHAGUS      ENDOSCOPY, COLON, DIAGNOSTIC      EYE SURGERY      GASTRIC BYPASS SURGERY  Jan 2009    Has lost about 200 pounds.  HERNIA REPAIR  3-    ventral    JOINT REPLACEMENT      KIDNEY REMOVAL Left 08/01/2018    KNEE ARTHROSCOPY Right 7/11/2013    Dr.Robert Sanders     KNEE ARTHROSCOPY Right 7/11/12    RIGHT KNEE ARTHROSCOPY WITH CHONDROPLASTY    KNEE SURGERY  July 2012    right, arthroscopy    KNEE SURGERY Right 2/18/2020    INCISION AND DRAINAGE RIGHT KNEE AND WOULD VAC PLACEMENT performed by Chester sTang MD at [facility]  2005    OTHER SURGICAL HISTORY Left 03/08/2016    CT biopsy ablasion left kidney    OTHER SURGICAL HISTORY  2016    small intestine 12 inch removed, 2 hernia surgeries, another surgery to drain infection from incision.      REVISION TOTAL KNEE ARTHROPLASTY Right 12/17/2019    RIGHT REVISION TIBIA TOTAL KNEE REPLACEMENT performed by Chester Tsang MD at [address] [facility] Right 1/22/2020    RIGHT KNEE IRRIGATION AND DEBRIDEMENT WITH POLY EXCHANGE performed by Prieto Roman MD at [facility] SPLENECTOMY      TOTAL KNEE ARTHROPLASTY  10/15/13    RIGHT    TOTAL KNEE ARTHROPLASTY Right 8/12/2020    RIGHT ARTHROPLASY  RESECTION WITH INSERTION OF SPACER performed by Chester [facility]ang MD at [facility]  6-7-2016    UPPER GASTROINTESTINAL ENDOSCOPY  04/02/2018       Current Medications:    Outpatient Medications Marked as Taking for the 8/12/20 encounter [facility] Encounter)   Medication Sig Dispense Refill    oxyCODONE (ROXICODONE) 5 MG immediate release tablet Take 1-2 tablets by mouth every 6 hours as needed for Pain for up to 5 days. 40 tablet 0    aspirin 81 MG tablet Take 1 tablet by mouth daily for 14 days Take twice a day for 14 days after knee surgery then resume daily dosing 28 tablet 0    zolpidem (AMBIEN) 10 MG tablet Take by mouth nightly as needed for Sleep.  atorvastatin (LIPITOR) 40 MG tablet TAKE 1 TABLET BY MOUTH EVERY NIGHT AT BEDTIME 90 tablet 0    dextrose 5 % SOLN 50 mL with ketamine 100 MG/ML SOLN 50 mg Infuse intravenously continuous Indications: 350 mg once every 3 months      traZODone (DESYREL) 100 MG tablet Take 2 tablets by mouth nightly 180 tablet 1    FLUoxetine (PROZAC) 20 MG capsule Take 1 capsule by mouth daily 90 capsule 1    acetaminophen (TYLENOL) 500 MG tablet Take 2 tablets by mouth 3 times daily (with meals) (Patient taking differently: Take 1,000 mg by mouth 3 times daily as needed ) 42 tablet 0    pantoprazole (PROTONIX) 40 MG tablet Take 1 tablet by mouth 2 times daily      calcium-vitamin D (OSCAL 500/200 D-3) 500-200 MG-UNIT per tablet Take 1 tablet by mouth 2 times daily       Multiple Vitamin TABS Take 1 tablet by mouth daily          Allergies:  Nsaids; Morphine; Prochlorperazine; Valtrex [valacyclovir hcl];  Morphine and related; and Tolmetin    Immunizations :   Immunization History   Administered Date(s) Administered    HIB PRP-T (ActHIB, Hiberix) 09/13/2010    Hib, unspecified 02/09/2015    Influenza Virus 08/13/2020    CO2 23 08/13/2020       Hepatic Function Panel:   Lab Results   Component Value Date    ALKPHOS 110 08/13/2020    ALT 7 08/13/2020    AST 10 08/13/2020    PROT 6.3 08/13/2020    PROT 6.7 01/07/2015    BILITOT 0.3 08/13/2020    BILIDIR <0.2 02/18/2015    IBILI 0.3 02/18/2015    LABALBU 3.8 08/13/2020       UA:  Lab Results   Component Value Date    COLORU YELLOW 08/06/2020    CLARITYU Clear 08/06/2020    GLUCOSEU Negative 08/06/2020    GLUCOSEU NEGATIVE 02/07/2011    BILIRUBINUR Negative 08/06/2020    BILIRUBINUR NEGATIVE 02/07/2011    KETUA Negative 08/06/2020    SPECGRAV 1.006 08/06/2020    BLOODU Negative 08/06/2020    PHUR 6.0 08/06/2020    PROTEINU Negative 08/06/2020    UROBILINOGEN 1.0 08/06/2020    NITRU Negative 08/06/2020    LEUKOCYTESUR TRACE 08/06/2020    LABMICR YES 08/06/2020    URINETYPE NotGiven 08/06/2020      Urine Microscopic:   Lab Results   Component Value Date    HYALCAST 0 08/06/2020    WBCUA 1 08/06/2020    RBCUA 0 08/06/2020    EPIU 0 08/06/2020       MICRO: cultures reviewed and updated by me       /20 0419        Order Status: Completed  Specimen: Joint/Joint Fluid from Joint, Knee  Updated: 08/13/20 1249     Gram Stain Result  1+ WBC's (Mononuclear)   No Epithelial Cells seen   No organisms seen     Culture Surgical  No growth to date    Narrative:      ORDER#: [id number]                          ORDERED BY: Joshua Mohan   SOURCE: Knee                               COLLECTED:  08/12/20 13:52   ANTIBIOTICS AT JAX. :                      RECEIVED :  08/12/20 14:26   Performed at:   [facility]   [address] [address]   Phone [phone]    [place], [place] [[id number]]   Collected: 08/12/20 7811    Order Status: Completed  Specimen: Joint/Joint Fluid from Joint, Knee  Updated: 08/13/20 1249     Gram Stain Result  1+ WBC's (Polymorphonuclear)   No Epithelial Cells seen   No organisms seen     Culture, Joint Aerobic  No growth to date    Narrative:      ORDER#: [id number]                          ORDERED BY: Aranza Falcon   SOURCE: Knee                               COLLECTED:  08/12/20 13:38   ANTIBIOTICS AT JAX. :                      RECEIVED :  08/12/20 13:59   Performed at:   [facility]   [address]  [facility] [place] [place] [address]   Phone [phone]    Culture, Surgical [[id number]]   Collected: 08/12/20 1338    Order Status: Completed  Specimen: Joint/Joint Fluid from Joint, Knee  Updated: 08/13/20 1249     Gram Stain Result  2+ WBC's (Polymorphonuclear)   No Epithelial Cells seen   No organisms seen     Culture Surgical  No growth to date    Narrative:      ORDER#: [id number]                          ORDERED BY: Aranza Falcon   SOURCE: Knee                               COLLECTED:  08/12/20 13:38   ANTIBIOTICS AT JAX. :                      RECEIVED :  08/12/20 13:59   Performed at:   [facility]   [address] [facility] [place] [place] [address]   Phone [phone]    Culture with Smear, Acid Fast Bacillius [[id number]]      Order Status: Sent  Specimen: Joint, Knee     Culture, Fungus [[id number]]      Order Status: Sent  Specimen: Joint, Knee     Culture, Aerorbic and Amy Mom Joint [[id number]]   Collected: 08/12/20 1352    Order Status: Canceled  Specimen: Joint/Joint Fluid from Joint, Knee         IMAGING:    XR KNEE RIGHT (1-2 VIEWS)   Final Result   Postsurgical changes above               All the pertinent images and reports for the current Hospitalization were reviewed by me     Scheduled Meds:   acetaminophen  1,000 mg Oral TID WC    aspirin  81 mg Oral Daily    atorvastatin  40 mg Oral Daily    calcium-vitamin D  1 tablet Oral BID    FLUoxetine  20 mg Oral Daily    multivitamin  1 tablet Oral Daily    pantoprazole  40 mg Oral BID    traZODone  200 mg Oral Nightly    sodium chloride flush  10 mL Intravenous 2 times per day    sennosides-docusate sodium  1 tablet Oral BID    enoxaparin  30 mg Subcutaneous BID    lidocaine 1 % injection  5 mL Intradermal Once    daptomycin (CUBICIN) IVPB  600 mg Intravenous Q24H    cefepime  2 g Intravenous Q12H       Continuous Infusions:      PRN Meds:  HYDROmorphone, diphenhydrAMINE, dicyclomine, zolpidem, acetaminophen, oxyCODONE **OR** oxyCODONE, morphine, magnesium hydroxide, ondansetron, sodium chloride flush      Assessment:     Patient Active Problem List   Diagnosis    Insomnia-chronic intermittent--uses prn ambien--to be filled by dr Juanito Tan (sleep dr)   Paola Vidal daily ppi-last egd 5/15--dr Kortney Munoz History of tobacco useadvised to quit- quit 7/1/2014    Allergic rhinitis, seasonal    Obstructive sleep apnea,Severe -(on cpap)    Class 1 obesity due to excess calories with body mass index (BMI) of 34.0 to 34.9 in adult    Depression-on daily effexor xr--sees dr Thanh Phillips    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  Chondromalacia of patellofemoral joint    Chronic pain syndrome    History of stroke    Gastroesophageal reflux disease with esophagitis--on daily ppi    Intractable chronic migraine without aura and with status migrainosus    Iron deficiency anemia    B12 deficiency    Anastomotic ulcer    Malignant neoplasm of left kidney (HCC) clear cell. 8/1/18 Dr Ophelia Zhang.     Total knee replacement status, right    Failed total knee, right, initial encounter ([address]    Infection of total knee replacement ([address]    History of depression    History of alcoholism ([address]    Receiving intravenous antibiotic treatment as outpatient    SBO (small bowel obstruction) ([address]    Partial small bowel obstruction ([address]    Infection of prosthetic right knee joint ([address]     Rt TKA infection  S/p Rt KNEE resection arthroplasty and spacer placement on 8/12  Pre op Knee fluid aspiration very abnormal  H/o Rt TKA ID and wound vac placement for delayed healing in Feb 2020 and cx negative  Rt TKA s/p ID and wound cx Broth cx Corynebacterium in the past  Index Rt TKA surgery on Dec 2019  H/O Multiple abdominal surgery for SBO  H/o Gastric bypass surgery   H/o Splenectomy and Nephrectomy      S/p Rt knee explanted and spacer in place will follow OR cx and adjust IV abx   cx may be affected by prior abx use    picc PLACED for IV abx and thus far OR cx negative  to adjust anticipate x 6 weeks of iv ABX THERAPY     Labs, Microbiology, Radiology and all the pertinent results from current hospitalization and  care every where were reviewed  by me as a part of the evaluation   Plan:   1. IV Daptomycin x 600 mg x Q 24 HRS  due to single Kidney avoiding Vancomycin  2. IV Cefepime x 2 gm q 12 hrs  3. ESR 5 , CRP 6.5   4. Fungal and AFB cx on the OR tissue/fluid   5. Will need x 6 weeks of IV abx and PICC line placement   6. opat D/W PT     Discussed with patient/Family and Nursing d/w Ortho team  Risk of Complications/Morbidity: High      · Illness(es)/ Infection present that pose threat to bodily function. · There is potential for severe exacerbation of infection/side effects of treatment. Therapy requires intensive monitoring for antimicrobial agent toxicity     Discussed with patient/Family and Nursing staff     Thanks for allowing me to participate in your patient's care and please call me with any questions or concerns.     Pita Tanner MD  Infectious Disease  [facility]) Physician  Phone: [phone]   Fax : [phone]

## 2020-08-14 NOTE — PLAN OF CARE
Problem: Mood - Altered:  Goal: Mood stable  Description: Mood stable  8/13/2020 2004 by Padmini Navas RN  Note: Pt mood will remain stable this shift. 8/13/2020 1015 by Luli Hightower RN  Outcome: Ongoing  Note: Pt mood seems to be positive today, pt hopes to go home today or tomorrow, will continue to monitor and assess. Electronically signed by Luli Hightower RN on 8/13/2020 at 10:16 AM       Problem: Mobility - Impaired:  Goal: Mobility will improve  Description: Mobility will improve  8/13/2020 2004 by Padmini Navas RN  Note: Pt mobility will improve. Pt will participate in mobility this shift. 8/13/2020 1015 by Luli Hightower RN  Outcome: Ongoing  Note: Pt working with nursing staff and therapy to improve mobility. Electronically signed by Luli Hightower RN on 8/13/2020 at 10:17 AM       Problem: Infection - Surgical Site:  Goal: Will show no infection signs and symptoms  Description: Will show no infection signs and symptoms  8/13/2020 2004 by Padmini Navas RN  Note: Surgical site will show no signs or symptoms of infection this shift. 8/13/2020 1015 by Luli Hightower RN  Outcome: Ongoing  Note: Pt has no s/s of infection, will continue to monitor and assess. Electronically signed by Luli Hightower RN on 8/13/2020 at 10:17 AM       Problem: Pain - Acute:  Goal: Pain level will decrease  Description: Pain level will decrease  8/13/2020 1015 by Angel Meade RN  Outcome: Completed  Note: Pt assessed for pain. Pt in pain and assessed with 0-10 pain rating scale. Pt given prescribed analgesic for pain. (See eMar) Pt satisfied with pain relief thus far. Will reassess and continue to monitor. Electronically signed by Luli Hightower RN on 8/13/2020 at 10:18 AM       Problem: Pain:  Goal: Pain level will decrease  Description: Pain level will decrease  8/13/2020 1015 by Cecelia Meade RN  Outcome: Completed  Note: Pt assessed for pain. Pt in pain and assessed with 0-10 pain rating scale.  Pt given prescribed analgesic for pain. (See eMar) Pt satisfied with pain relief thus far. Will reassess and continue to monitor. Electronically signed by Brandi Liao RN on 8/13/2020 at 10:18 AM    Goal: Control of acute pain  Description: Control of acute pain  8/13/2020 1015 by Brandi Liao RN  Outcome: Completed  Goal: Control of chronic pain  Description: Control of chronic pain  8/13/2020 1015 by Brandi Liao RN  Outcome: Completed     Problem: Falls - Risk of:  Goal: Will remain free from falls  Description: Will remain free from falls  8/13/2020 2004 by Amauri Kemp RN  Note: Pt remains free from falls this shift. Fall precautions in place. Will continue to monitor. 8/13/2020 1015 by Brandi Liao RN  Outcome: Ongoing  Note: Fall risk assessment completed. Fall precautions in place. Call light within reach. Pt educated on calling for assistance before getting up. Walkway free of clutter. Will continue to monitor.    Electronically signed by Brandi Liao RN on 8/13/2020 at 10:18 AM    Goal: Absence of physical injury  Description: Absence of physical injury  8/13/2020 1015 by Brandi Liao RN  Outcome: Ongoing

## 2020-08-14 NOTE — TELEPHONE ENCOUNTER
Curt Tam from [facility] called; has concerns about pt dressing/saturated in blood. Please advise.      Call [phone]

## 2020-08-14 NOTE — PROGRESS NOTES
well  Safety Devices  Safety Devices in place: Yes  Type of devices: Nurse notified;Gait belt;Call light within reach; Left in bed         Patient Diagnosis(es): The encounter diagnosis was Infection associated with internal right knee prosthesis, initial encounter ([facility] [address] has a past medical history of Alcoholism ([facility] [address] Allergic migraine with status migrainosus, Allergic rhinitis, seasonal, Anemia, Arthritis, Vaughn's esophagus, Benign intracranial hypertension, Cancer ([address] Carpal tunnel syndrome, Chronic pain, Depression, Depression, GERD (gastroesophageal reflux disease), Headache(784.0), History of blood transfusion, History of tobacco use, Migraine, Morbid obesity ([facility] [address] Movement disorder, Onychomycosis, Sleep apnea, obstructive, Unspecified cerebral artery occlusion with cerebral infarction, Wears dentures, and Wears glasses. has a past surgical history that includes Gastric bypass surgery (Jan 2009); Splenectomy; LASIK (2005); knee surgery (July 2012); Carpal tunnel release; laparotomy; Knee arthroscopy (Right, 7/11/2013); Knee arthroscopy (Right, 7/11/12); Total knee arthroplasty (10/15/13); Endoscopy, colon, diagnostic; Dilatation, esophagus; eye surgery; joint replacement; Abdominal exploration surgery (1/11/16); other surgical history (Left, 03/08/2016); hernia repair (3-); Upper gastrointestinal endoscopy (6-7-2016); other surgical history (2016); Upper gastrointestinal endoscopy (04/02/2018); Kidney removal (Left, 08/01/2018); Abdomen surgery; Abdomen surgery (4-7-2016); Revision total knee arthroplasty (Right, 12/17/2019); Revision total knee arthroplasty (Right, 1/22/2020); knee surgery (Right, 2/18/2020); and Total knee arthroplasty (Right, 8/12/2020).     Restrictions  Restrictions/Precautions  Restrictions/Precautions: Fall Risk, Weight Bearing  Lower Extremity Weight Bearing Restrictions  Right Lower Extremity Weight Bearing: Weight Bearing As Tolerated  Position Activity Restriction  Other position/activity restrictions: knee immobilizer   wbat  Subjective   General  Chart Reviewed: Yes  Patient assessed for rehabilitation services?: Yes  Additional Pertinent Hx: Pt is 53 y.o. M who presents with infection of prosthestic R knee joint. Pt is s/p R arthroplasty resection with insertion of spacer. Pt with original R TKA in 2013, Revision in 12/2019, I&D and polyexchange in Jan 2020, and in Feb had I& D and wound vac placement. PMH: CVA, alcoholism, depression, chronic pain  Response to previous treatment: Patient with no complaints from previous session  Family / Caregiver Present: No  Referring Practitioner: Rahat Gomez MD  Diagnosis: Infection of prosthestic R knee joint  Subjective  Subjective: Patient supine in bed upon arrival to room. Patient agreeable to OT. RN and surgeon present during treatment. PRINCESS/S removed knee immobilizer to attempt to don dimas hose on RLE and dressings saturated in blood. NORBERTO Haywood Pipes notified and removal and cleaning performed. Orientation  Orientation  Overall Orientation Status: Within Functional Limits  Objective    ADL  Equipment Provided: Reacher;Sock aid;Long-handled sponge  Grooming: Stand by assistance(Standing at sink to wash face)  UE Bathing: Stand by assistance(Standing at sink to wash UE)  UE Dressing: Stand by assistance(Patient standing to donned top)  LE Dressing: Contact guard assistance(Patient seated on edge of bed to donned shorts and provided reacher and sock aid to remove socks)  Additional Comments: Patient SBA with grooming, UE bathing, and UE dressing. Patient CGA with LE dressing with reacher and sock aid provided. Patient decliend LE bathing and tolieting at this time.         Balance  Sitting Balance: Supervision  Standing Balance: Stand by assistance  Standing Balance  Activity: Standing at sink to complete ADL tasks and transfers  Functional Mobility  Functional - Mobility Device: Rolling Walker  Activity: To/from bathroom  Assist Level: Stand by assistance  Functional Mobility Comments: Patient completed functional mobility with SBA using RW, with a steady pace and no LOB noted. Bed mobility  Supine to Sit: Stand by assistance  Sit to Supine: Stand by assistance  Comment: Patient completed sit<>supine with SBA with HOB elevated and use of handrail.   Transfers  Sit to stand: Stand by assistance  Stand to sit: Stand by assistance  Transfer Comments: SBA for sit <> stand from EOB to RW and sit back to EOB     Cognition  Overall Cognitive Status: [address] [place]  Times per week: 3-5  Current Treatment Recommendations: Strengthening, Endurance Training, Balance Training, Functional Mobility Training, Safety Education & Training, Equipment Evaluation, Education, & procurement, Patient/Caregiver Education & Training, Self-Care / ADL    AM-PAC Score        AM-PAC Inpatient Daily Activity Raw Score: 21 (08/14/20 0853)  AM-PAC Inpatient ADL T-Scale Score : 44.27 (08/14/20 0853)  ADL Inpatient CMS 0-100% Score: 32.79 (08/14/20 0853)  ADL Inpatient CMS G-Code Modifier : Inell Bruna (08/14/20 4223)    Goals  Short term goals  Time Frame for Short term goals: prior to d/c: all goals ongoing  Short term goal 1: Pt will complete functional mobility and transfers with SPV  Short term goal 2: Pt will complete bathing with SPV  Short term goal 3: Pt will complete dressing with SPV  Short term goal 4: Pt will complete toileting with SPV  Patient Goals   Patient goals : \"to go home\"       Therapy Time   Individual Concurrent Group Co-treatment   Time In [id number]         Time Out 9316         Minutes 60         If discharged prior to next OT session please see last daily note for discharge status    I attest that I was present for and made a skilled and mindful clinical judgement during the treatment of this patient 8/14/2020    Electronically signed by FRANCIE [id number] on 8/14/2020 at 9:07 AM      Elizabeth Barnhart S/PRINCESS

## 2020-08-14 NOTE — CARE COORDINATION
Ciera aware of patient's dc today. Sw left message for [facility] liaison regarding dc. Ciera spoke with Jaelyn Coyle at [facility]. He is aware of dc today. Electronically signed by Joon Morrison on 8/14/2020 at 9:53 AM    9:56 AM  [facility] will accept patient, waiting on completed JEIMY.     Electronically signed by Joon Morrison on 8/14/2020 at 9:57 AM

## 2020-08-14 NOTE — PROGRESS NOTES
Drainage from R knee noted to have slowed. ABD pads with scant sanguinous drainage at this time. Scheduled aspirin and Lovenox held r/t increased bleeding this morning. Still waiting for return call from MD regarding patient increased bleeding/drainage and to verify if labs should be ordered. D/c order noted. This RN informed by TRISTAN Ross that patient cannot be d/c until ID sees patient to determine abx needs at d/c. Patient denies physical/emotional needs at this time. Will continue to monitor and assess.

## 2020-08-14 NOTE — PLAN OF CARE
Problem: Mood - Altered:  Goal: Mood stable  Description: Mood stable  Outcome: Ongoing  Note: Patient mood appears stable during this shift; patient eager for d/c and happy about going home. Problem: Discharge Planning:  Goal: Discharged to appropriate level of care  Description: Discharged to appropriate level of care  Outcome: Ongoing  Note: Patient to be d/c home today with home care. Problem: Mobility - Impaired:  Goal: Mobility will improve  Description: Mobility will improve  Outcome: Ongoing  Note: Patient WBAT during this shift to BLE and ambulates x1 with a walker with immobilizer in place. Patient able to safely ambulate with staff assistance during this shift. Problem: Infection - Surgical Site:  Goal: Will show no infection signs and symptoms  Description: Will show no infection signs and symptoms  Outcome: Ongoing  Note: Patient remains free from new signs and symptoms of infection during this shift. Infection prevention measures are in place. Will continue to monitor for alterations in patient condition throughout the shift. Problem: Falls - Risk of:  Goal: Will remain free from falls  Description: Will remain free from falls  Outcome: Ongoing  Note: Patient remains free from falls during this shift. Fall precautions remain in place. Patient educated on the need to implement call light use prior to ambulation. Will continue to monitor and assess. Goal: Absence of physical injury  Description: Absence of physical injury  Outcome: Ongoing  Note: Patient remains free from physical harm during this shift. Will continue to monitor and assess patient.

## 2020-08-14 NOTE — PROGRESS NOTES
This RN received call back from Dr. Lily Walters regarding patient bleeding and increased drainage this morning. This RN instructed to place several ABD pads, an ACE wrap, and immobilizer back onto patient RLE. Patient is not to take these items off until follow up appointment on Monday 8/17/2020.

## 2020-08-14 NOTE — PROGRESS NOTES
ABD pads, ACE wrap, and immobilizer in place on RLE per orders from Dr. Marguerita Osgood. Ice machine in place and infusing. Patient tolerated application well and reports that pain is \"tolerable\". Patient denies physical/emotional needs at this time. Will continue to monitor and assess.

## 2020-08-14 NOTE — PROGRESS NOTES
This patient is a 43-year-old male who presents today for preoperative discussion of revision and resection arthroplasty of his right knee. His knee history is in 2012 he underwent primary cemented total knee arthroplasty performed by Dr. Rocio Schneider. I became involved with this patient in the end of 2019 where at that time he was complaining of instability of the knee joint. He underwent simple poly-exchange and this corrected his instability problem. This patient is status post gastric bypass procedure and has a very well poor or low protein status. He struggled with healing his wound and was brought to the operating room by Dr. David Hernandez for I&D for possible deep infection with poly-exchange. Patient continued to struggle with healing and I brought him back to the operating room and underwent a similar procedure just irrigation debridement and primary closure of his wound. Since that time this patient has not been able to heal his wound and whether this is due to persistent infection or other factors including smoking and protein deficiency unknown. 1 of his cultures and only 1 of his cultures grew a species of corynebacteria and he has been on IV and oral antibiotics since early 2020. Patient still is complaining about pain and is having great difficulty healing his wound. We have discussed with this patient possible need and now we will move forward with a resection arthroplasty and staged revision.     Patient Active Problem List   Diagnosis    Insomnia-chronic intermittent--uses prn ambien--to be filled by dr Juanito Tan (sleep dr)   Paola Hopes daily ppi-last egd 5/15--dr Kortney Munoz History of tobacco useadvised to quit- quit 7/1/2014    Allergic rhinitis, seasonal    Obstructive sleep apnea,Severe -(on cpap)    Class 1 obesity due to excess calories with body mass index (BMI) of 34.0 to 34.9 in adult    Depression-on daily effexor xr--sees dr Manish Santillan History of (TYLENOL) 500 MG tablet Take 2 tablets by mouth 3 times daily (with meals)  Patient taking differently: Take 1,000 mg by mouth 3 times daily as needed   Robby Carrero PA-C   pantoprazole (PROTONIX) 40 MG tablet Take 1 tablet by mouth 2 times daily  Sal Go MD   sildenafil (REVATIO) 20 MG tablet Take 4 tablets by mouth as needed (30 min prior to intercourse)  Sal Go MD   calcium-vitamin D (OSCAL 500/200 D-3) 500-200 MG-UNIT per tablet Take 1 tablet by mouth 2 times daily   Historical Provider, MD   Multiple Vitamin TABS Take 1 tablet by mouth daily   Historical Provider, MD     Physical examination 41-year-old male oriented x3 temperature is 98.4. Examination of his back shows decreased range of motion but no specific pain tenderness or instability. Motor exam to the right lower extremity shows quadriceps hamstrings hip abductors abductors foot plantar dorsiflexors are intact 4-5 over 5. Sensation and perfusion are intact to the right foot and ankle. There are no obvious cutaneous lesions lymphedema or obvious cellulitic processes noted in the right lower extremity. Deep tendon reflexes are 0 at the knee and 0 at the ankle of the right lower extremity. Examination of the right knee shows a questionable healed anterior wound. Good range of motion and good overall stability is seen. Patient just has chronic pain. Impression possible occult infection periprosthetic in nature to the right knee. We had a 35 minute face-to-face discussion of which greater than 50% of the time was spent in counseling with this patient concerning revision resection right knee arthroplasty it's preoperative evaluation and its postoperative pain management and follow-up.   We went over the surgical procedure risks and complications including bleeding, infection,  neurovascular injury, decreased range of motion, persistent pain, instability, DVT, pulmonary embolism, leg length inequality, change in mental status, and need for further surgical procedures. The patient understands this and we have answered all of their questions and concerns. We did discuss with the patient after the resection revision was for rule out deep sepsis of unknown nature. Control of his pain and the possibility of him becoming pain-free is not why we are doing his resection arthroplasty. This pain may never be completely eliminated. He understands that we have answered all of his questions about that. We have discussed this with the patient's infectious disease doctor and he will follow in the hospital for further care and treatment and antibiotic regimen. The patient was counseled at length about the risks of michelle Covid-19 during their perioperative period and any recovery window from their procedure. The patient was made aware that michelle Covid-19  may worsen their prognosis for recovering from their procedure  and lend to a higher morbidity and/or mortality risk. All material risks, benefits, and reasonable alternatives including postponing the procedure were discussed. The patient does wish to proceed with the procedure at this time.

## 2020-08-14 NOTE — PROGRESS NOTES
New knee immobilizer requested from central supply as patient's previous immobilizer noted to have copious amounts of blood on it. Lito Weemss in central supply to deliver immobilizer to patient room.

## 2020-08-15 ENCOUNTER — CARE COORDINATION (OUTPATIENT)
Dept: CASE MANAGEMENT | Age: 50
End: 2020-08-15

## 2020-08-15 NOTE — CARE COORDINATION
Janay 45 Transitions Initial Follow Up Call    Call within 2 business days of discharge: Yes    Patient: Agusto Camacho Patient : 1970   MRN: <[id number]>  Reason for Admission: Periprosthetic sepsis of the R knee  Discharge Date: 20 RARS: Readmission Risk Score: 61         Spoke with:   patient    Facility:   [facility]      Follow Up:  Spoke with patient briefly. Reports that he is doing well. Patient reports that he has all of his medications. Reports that he was evaluated by home PT this morning and C RN is currently present. Patient denies needs. CTN contact information given with request for call back. Patient is agreeable to continued Care Transition.   Future Appointments   Date Time Provider Timothy Perezi   2020  8:00 AM MD Gian Lees [facility]   2020 10:30 AM David Gallagher MD W ORTHO [facility]   2020 12:00 PM Jacqui Ozuna, APRN - CNP FF SLEEP MED [facility]       Marta Pink RN

## 2020-08-17 ENCOUNTER — OFFICE VISIT (OUTPATIENT)
Dept: ORTHOPEDIC SURGERY | Age: 50
End: 2020-08-17

## 2020-08-17 VITALS — HEIGHT: 72 IN | WEIGHT: 236 LBS | TEMPERATURE: 97.8 F | BODY MASS INDEX: 31.97 KG/M2

## 2020-08-17 LAB
ANAEROBIC CULTURE: NORMAL
ANION GAP SERPL CALCULATED.3IONS-SCNC: 5 MMOL/L (ref 6–18)
BASOPHILS ABSOLUTE: 0.1 THOU/MCL (ref 0–0.2)
BASOPHILS ABSOLUTE: 1 %
BUN BLDV-MCNC: 13 MG/DL (ref 8–26)
C-REACTIVE PROTEIN WIDE RANGE: 45 MG/L
CALCIUM SERPL-MCNC: 8.6 MG/DL (ref 8.5–10.5)
CHLORIDE BLD-SCNC: 103 MEQ/L (ref 101–111)
CO2: 29 MMOL/L (ref 24–36)
CREAT SERPL-MCNC: 1.03 MG/DL (ref 0.64–1.27)
CREATINE KINASE ISOENZYMES, SER/PLAS: 39 IU/L (ref 0–200)
CULTURE SURGICAL: NORMAL
EOSINOPHILS ABSOLUTE: 0.5 THOU/MCL (ref 0.03–0.45)
EOSINOPHILS RELATIVE PERCENT: 6 %
GFR AFRICAN AMERICAN: 97 ML/MIN/1.73 M2
GFR NON-AFRICAN AMERICAN: 84 ML/MIN/1.73 M2
GLUCOSE BLD-MCNC: 92 MG/DL (ref 70–99)
GRAM STAIN RESULT: NORMAL
HCT VFR BLD CALC: 32.9 % (ref 40–50)
HEMOGLOBIN: 10.8 G/DL (ref 13.5–16.5)
LYMPHOCYTES ABSOLUTE: 2.4 THOU/MCL (ref 1–4)
LYMPHOCYTES RELATIVE PERCENT: 26 %
MCH RBC QN AUTO: 28.5 PG (ref 27–33)
MCHC RBC AUTO-ENTMCNC: 32.8 G/DL (ref 32–36)
MCV RBC AUTO: 86.9 FL (ref 82–97)
MONOCYTES # BLD: 10 %
MONOCYTES ABSOLUTE: 0.9 THOU/MCL (ref 0.2–0.9)
NEUTROPHILS ABSOLUTE: 5.3 THOU/MCL (ref 1.8–7.7)
PDW BLD-RTO: 15 % (ref 12.3–17)
PLATELET # BLD: 357 THOU/MCL (ref 140–375)
PMV BLD AUTO: 8.9 FL (ref 7.4–11.5)
POTASSIUM SERPL-SCNC: 4 MEQ/L (ref 3.6–5.1)
RBC # BLD: 3.78 MIL/MCL (ref 4.4–5.8)
SEDIMENTATION RATE, ERYTHROCYTE: 21 MM/HR (ref 0–15)
SEG NEUTROPHILS: 57 %
SODIUM BLD-SCNC: 137 MEQ/L (ref 135–145)
WBC # BLD: 9.2 THOU/MCL (ref 3.6–10.5)

## 2020-08-17 PROCEDURE — 99024 POSTOP FOLLOW-UP VISIT: CPT | Performed by: ORTHOPAEDIC SURGERY

## 2020-08-18 ENCOUNTER — CARE COORDINATION (OUTPATIENT)
Dept: CASE MANAGEMENT | Age: 50
End: 2020-08-18

## 2020-08-18 NOTE — CARE COORDINATION
Janay 45 Transitions Follow Up Call    2020    Patient: Hildegarde Goldberg  Patient : 1970   MRN: <[id number]>  Reason for Admission: Infection right knee  Discharge Date: 20 RARS: Readmission Risk Score: 61         Spoke with: Patient    Patient states he is doing well. Still has some right knee pain. Incision clean and dry - no drainage. Up and ambulating with cane. No fever or chills. Patient states has all medications is taking medications as directed and has no questions concerning medications at this time. Patient states knows when to contact physician or report to ED with worsening or severe symptoms, changes, or concerns. Will Follow up at later time. Cholo Kemp LPN     [facility] / [address] [place] [address] Transitions Subsequent and Final Call    Subsequent and Final Calls  Do you have any ongoing symptoms?:  Yes  Patient-reported symptoms:  Pain  Have your medications changed?:  No  Do you have any questions related to your medications?:  No  Do you currently have any active services?:  No  Are you currently active with any services?:  Home Health  Do you have any needs or concerns that I can assist you with?:  No  Identified Barriers:  None  Care Transitions Interventions  Other Interventions:             Follow Up  Future Appointments   Date Time Provider Timothy Beal   9/3/2020  9:30 AM MD KATIE Archer ORTHO [facility]   2020 12:00 PM LILLIE Bowers - CNP FF SLEEP MED [facility] Kemp LPN

## 2020-08-19 ENCOUNTER — TELEPHONE (OUTPATIENT)
Dept: ORTHOPEDIC SURGERY | Age: 50
End: 2020-08-19

## 2020-08-19 RX ORDER — OXYCODONE HYDROCHLORIDE 5 MG/1
5-10 TABLET ORAL EVERY 6 HOURS PRN
Qty: 40 TABLET | Refills: 0 | Status: SHIPPED | OUTPATIENT
Start: 2020-08-19 | End: 2020-08-26 | Stop reason: SDUPTHER

## 2020-08-21 ENCOUNTER — TELEPHONE (OUTPATIENT)
Dept: ORTHOPEDIC SURGERY | Age: 50
End: 2020-08-21

## 2020-08-21 NOTE — PROGRESS NOTES
Patient is a 52-year-old male who is status post resection arthroplasty with spacer placement right knee on 8/12/2020. This is a patient who just before he was about to be discharged had a large bloody drainage from his a wound. This was treated with compression dressing and now he is about 3 days from this event and we are here for just a repeat evaluation. He has had no new history of drainage from his knee. Physical examination 52-year-old male oriented x3 temperature is 97.8. Examination of the right knee shows a stable healing anterior wound. There is some redness and cellulitic changes however he has absolutely no drainage from his wound on today's exam.  Distal neurovascular examinations intact to the right foot and ankle. X-rays obtained AP lateral of the right knee show status post resection arthroplasty with polyethylene spacer and distal femoral metal component. Stable overall orientation and alignment with no obvious signs of loosening failure or fracture noted. Impression 52-year-old male stable status post resection total knee arthroplasty for presumed infection. Plan continue physical therapy weightbearing as tolerated range of motion as tolerated continue IV antibiotics as per ID follow-up in 2 to 3 weeks for repeat evaluation at that time.

## 2020-08-23 NOTE — DISCHARGE SUMMARY
Physician Discharge Summary     Patient ID:  Waylon Dave  [id number]  9 y.o.  1970    Admit date: 8/12/2020    Discharge date and time: 8/14/2020  7:02 PM     Admitting Physician: Yair Johnson MD     Discharge Physician: Dea Lockhart    Admission Diagnoses: Infection associated with internal right knee prosthesis, initial encounter [facility]) Sherrell Pelletier    Discharge Diagnoses:INfection right TKA  Admission Condition: good    Discharged Condition: good    Indication for Admission: Failed conservative treatment as outpatient for joint pain including PT and pain meds. This patient was then electively scheduled for total joint replacement surgery    Surgical procedure: right knee I and D with spacer exchange    Consults: PT OT SS    This patient had no postoperative complications. They has PT and OT for ADL's . IV and PO pain med for pain control and was eventually DC in stable condition    Treatments: analgesia,  therapies: PT OT,  and surgery      Disposition: home    Patient Instructions:   [unfilled]  Activity: activity as tolerated  Diet: regular diet  Wound Care: keep wound clean and dry    Follow-up with Dea Lockhart in 2 weeks.     Signed:  Radha Byrnes  8/23/2020  1:53 PM

## 2020-08-24 LAB
ANION GAP SERPL CALCULATED.3IONS-SCNC: 6 MMOL/L (ref 6–18)
BASOPHILS ABSOLUTE: 0.1 THOU/MCL (ref 0–0.2)
BASOPHILS ABSOLUTE: 1 %
BUN BLDV-MCNC: 14 MG/DL (ref 8–26)
C-REACTIVE PROTEIN WIDE RANGE: 36 MG/L
CALCIUM SERPL-MCNC: 8.9 MG/DL (ref 8.5–10.5)
CHLORIDE BLD-SCNC: 105 MEQ/L (ref 101–111)
CO2: 26 MMOL/L (ref 24–36)
CREAT SERPL-MCNC: 1.02 MG/DL (ref 0.64–1.27)
CREATINE KINASE ISOENZYMES, SER/PLAS: 58 IU/L (ref 0–200)
EOSINOPHILS ABSOLUTE: 0.4 THOU/MCL (ref 0.03–0.45)
EOSINOPHILS RELATIVE PERCENT: 5 %
GFR AFRICAN AMERICAN: 98 ML/MIN/1.73 M2
GFR NON-AFRICAN AMERICAN: 85 ML/MIN/1.73 M2
GLUCOSE BLD-MCNC: 116 MG/DL (ref 70–99)
HCT VFR BLD CALC: 34.4 % (ref 40–50)
HEMOGLOBIN: 11.2 G/DL (ref 13.5–16.5)
LYMPHOCYTES ABSOLUTE: 2 THOU/MCL (ref 1–4)
LYMPHOCYTES RELATIVE PERCENT: 26 %
MCH RBC QN AUTO: 28.5 PG (ref 27–33)
MCHC RBC AUTO-ENTMCNC: 32.6 G/DL (ref 32–36)
MCV RBC AUTO: 87.4 FL (ref 82–97)
MONOCYTES # BLD: 7 %
MONOCYTES ABSOLUTE: 0.5 THOU/MCL (ref 0.2–0.9)
NEUTROPHILS ABSOLUTE: 4.8 THOU/MCL (ref 1.8–7.7)
PDW BLD-RTO: 15.4 % (ref 12.3–17)
PLATELET # BLD: 469 THOU/MCL (ref 140–375)
PMV BLD AUTO: 8.8 FL (ref 7.4–11.5)
POTASSIUM SERPL-SCNC: 4.4 MEQ/L (ref 3.6–5.1)
RBC # BLD: 3.93 MIL/MCL (ref 4.4–5.8)
SEDIMENTATION RATE, ERYTHROCYTE: 30 MM/HR (ref 0–15)
SEG NEUTROPHILS: 61 %
SODIUM BLD-SCNC: 137 MEQ/L (ref 135–145)
WBC # BLD: 7.8 THOU/MCL (ref 3.6–10.5)

## 2020-08-25 ENCOUNTER — CARE COORDINATION (OUTPATIENT)
Dept: CASE MANAGEMENT | Age: 50
End: 2020-08-25

## 2020-08-25 ENCOUNTER — TELEPHONE (OUTPATIENT)
Dept: ORTHOPEDIC SURGERY | Age: 50
End: 2020-08-25

## 2020-08-26 RX ORDER — OXYCODONE HYDROCHLORIDE 5 MG/1
5-10 TABLET ORAL EVERY 6 HOURS PRN
Qty: 40 TABLET | Refills: 0 | Status: SHIPPED | OUTPATIENT
Start: 2020-08-26 | End: 2020-09-03 | Stop reason: SDUPTHER

## 2020-08-31 LAB
ANAEROBIC CULTURE: NORMAL
ANION GAP SERPL CALCULATED.3IONS-SCNC: 8 MMOL/L (ref 6–18)
BASOPHILS ABSOLUTE: 0.1 THOU/MCL (ref 0–0.2)
BASOPHILS ABSOLUTE: 2 %
BUN BLDV-MCNC: 14 MG/DL (ref 8–26)
C-REACTIVE PROTEIN WIDE RANGE: 83 MG/L
CALCIUM SERPL-MCNC: 8.9 MG/DL (ref 8.5–10.5)
CHLORIDE BLD-SCNC: 104 MEQ/L (ref 101–111)
CO2: 26 MMOL/L (ref 24–36)
CREAT SERPL-MCNC: 1.05 MG/DL (ref 0.64–1.27)
CREATINE KINASE ISOENZYMES, SER/PLAS: 58 IU/L (ref 0–200)
CULTURE SURGICAL: NORMAL
CULTURE, JOINT AEROBIC: NORMAL
CULTURE, JOINT ANAEROBIC: NORMAL
EOSINOPHILS ABSOLUTE: 0.4 THOU/MCL (ref 0.03–0.45)
EOSINOPHILS RELATIVE PERCENT: 5 %
GFR AFRICAN AMERICAN: 94 ML/MIN/1.73 M2
GFR NON-AFRICAN AMERICAN: 82 ML/MIN/1.73 M2
GLUCOSE BLD-MCNC: 84 MG/DL (ref 70–99)
GRAM STAIN RESULT: NORMAL
GRAM STAIN RESULT: NORMAL
HCT VFR BLD CALC: 33.8 % (ref 40–50)
HEMOGLOBIN: 11 G/DL (ref 13.5–16.5)
LYMPHOCYTES ABSOLUTE: 1.6 THOU/MCL (ref 1–4)
LYMPHOCYTES RELATIVE PERCENT: 22 %
MCH RBC QN AUTO: 28.1 PG (ref 27–33)
MCHC RBC AUTO-ENTMCNC: 32.6 G/DL (ref 32–36)
MCV RBC AUTO: 86.1 FL (ref 82–97)
MONOCYTES # BLD: 9 %
MONOCYTES ABSOLUTE: 0.6 THOU/MCL (ref 0.2–0.9)
NEUTROPHILS ABSOLUTE: 4.3 THOU/MCL (ref 1.8–7.7)
PDW BLD-RTO: 15.1 % (ref 12.3–17)
PLATELET # BLD: 604 THOU/MCL (ref 140–375)
PMV BLD AUTO: 8.2 FL (ref 7.4–11.5)
POTASSIUM SERPL-SCNC: 4.6 MEQ/L (ref 3.6–5.1)
RBC # BLD: 3.93 MIL/MCL (ref 4.4–5.8)
SEDIMENTATION RATE, ERYTHROCYTE: 56 MM/HR (ref 0–15)
SEG NEUTROPHILS: 62 %
SODIUM BLD-SCNC: 138 MEQ/L (ref 135–145)
WBC # BLD: 7 THOU/MCL (ref 3.6–10.5)

## 2020-09-03 ENCOUNTER — OFFICE VISIT (OUTPATIENT)
Dept: ORTHOPEDIC SURGERY | Age: 50
End: 2020-09-03

## 2020-09-03 VITALS — TEMPERATURE: 97.8 F

## 2020-09-03 PROCEDURE — 99024 POSTOP FOLLOW-UP VISIT: CPT | Performed by: PHYSICIAN ASSISTANT

## 2020-09-03 RX ORDER — OXYCODONE HYDROCHLORIDE 5 MG/1
5 TABLET ORAL EVERY 4 HOURS PRN
Qty: 42 TABLET | Refills: 0 | Status: SHIPPED | OUTPATIENT
Start: 2020-09-03 | End: 2020-09-09 | Stop reason: SDUPTHER

## 2020-09-03 NOTE — PROGRESS NOTES
This dictation was done with [employer] dictation and may contain mechanical errors related to translation. The review of systems was currently provided by the patient and reviewed with the medical assistant at today's visit. Please see media. Subjective:  Robe Mahan is a 53 y.o. who is here in follow-up for his right knee. He had a resection with spacer on 8/12/2020. At today's visit he does not have the pernio dressing on at his incision is healing nicely however he has normal swelling and edema. He was sent for x-rays including a standing AP lateral sunrise view of his right knee. 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  Chondromalacia of patellofemoral joint    Chronic pain syndrome    History of stroke    Gastroesophageal reflux disease with esophagitis--on daily ppi    Intractable chronic migraine without aura and with status migrainosus    Iron deficiency anemia    B12 deficiency    Anastomotic ulcer    Malignant neoplasm of left kidney (HCC) clear cell. 8/1/18 Dr Mercedes Najera.     Total knee replacement status, right    Failed total knee, right, initial encounter ([address]    Infection of total knee replacement ([address]    History of depression    History of alcoholism ([address]    Receiving intravenous antibiotic treatment as outpatient    SBO (small bowel obstruction) ([address]    Partial small bowel obstruction ([address]    Infection of prosthetic right knee joint ([address] Current Outpatient Medications on File Prior to Visit   Medication Sig Dispense Refill    aspirin 81 MG tablet Take 1 tablet by mouth daily for 14 days Take twice a day for 14 days after knee surgery then resume daily dosing 28 tablet 0    zolpidem (AMBIEN) 10 MG tablet Take by mouth nightly as needed for Sleep.  atorvastatin (LIPITOR) 40 MG tablet TAKE 1 TABLET BY MOUTH EVERY NIGHT AT BEDTIME 90 tablet 0    dextrose 5 % SOLN 50 mL with ketamine 100 MG/ML SOLN 50 mg Infuse intravenously continuous Indications: 350 mg once every 3 months      dicyclomine (BENTYL) 10 MG capsule Take 1 capsule by mouth every 6 hours as needed (cramps) 20 capsule 0    traZODone (DESYREL) 100 MG tablet Take 2 tablets by mouth nightly 180 tablet 1    FLUoxetine (PROZAC) 20 MG capsule Take 1 capsule by mouth daily 90 capsule 1    acetaminophen (TYLENOL) 500 MG tablet Take 2 tablets by mouth 3 times daily (with meals) (Patient taking differently: Take 1,000 mg by mouth 3 times daily as needed ) 42 tablet 0    pantoprazole (PROTONIX) 40 MG tablet Take 1 tablet by mouth 2 times daily      sildenafil (REVATIO) 20 MG tablet Take 4 tablets by mouth as needed (30 min prior to intercourse) 30 tablet 5    calcium-vitamin D (OSCAL 500/200 D-3) 500-200 MG-UNIT per tablet Take 1 tablet by mouth 2 times daily       Multiple Vitamin TABS Take 1 tablet by mouth daily        No current facility-administered medications on file prior to visit. Objective:   Temperature 97.8 °F (36.6 °C), temperature source Temporal.    On examination is pleasant 53year-old gentleman no acute distress he is alert and oriented x3 he is able to put weight bearing on it with the use of a cane for assistance. He has approximately 0 to 90 degrees of motion and neurologically he is intact to the right foot with good dorsiflexion and plantarflexion strength. Neuro exam grossly intact both lower extremities. Intact sensation to light touch. Motor exam 4+ to 5/5 in all major motor groups. Negative Wei's sign. Skin is warm, dry and intact with out erythema or significant increased temperature around the knee joint(s). There are no cutaneous lesions or lymphadenopathy present. X-RAYS:  X-rays were taken at the office today and reviewed by Dr. Marcos Turcios they showed all poly-spacer tibia and femoral component to be in good position residual bone not showing any abnormalities and overall alignment looks good. Assessment:  Status post right knee resection with spacer    Plan:  During today's visit, there was approximately 15 minutes of face-to-face discussion in regards to the patient's current condition and treatment options. More than 50 % of the time was counseling and coordination of care.       At this point he is working with the Kalyani Cohen and will do IV antibiotics to the end of September Dr. Marcos Turcios will see him sometime in October and discuss possible re-surgical implantation      PROCEDURE NOTE:   X-rays      They will schedule a follow up in 5 to 6 weeks

## 2020-09-04 ENCOUNTER — CARE COORDINATION (OUTPATIENT)
Dept: CASE MANAGEMENT | Age: 50
End: 2020-09-04

## 2020-09-04 NOTE — CARE COORDINATION
[place] [id number] Transitions Follow Up Call    2020    Patient: Milla Bae  Patient : 1970   MRN: [id number]  Reason for Admission:   Discharge Date: 20 RARS: Readmission Risk Score: 60         Spoke with: Isis Morin (patient)    Spoke with Parviz Mitchell. He states he is doing \"fine\". Render Sink states [facility] remains active. He rates his right knee pain at 4/10 - he states he is currently taking Tylenol only. Render Sink states his incision is intact. He denies any fever. Render Sink states he is beginning to be up and about without a cane. He states he is tolerating food & fluids - denies n/v. Render Sink states his bowels are moving and he is urinating without difficulty. Render Sink denies any needs today. He is agreeable to follow up from [facility]. He has [facility] contact info. He will reach out to [place] [id number] or one of his physicians with any non life threatening medical issues or concerns over the weekend & holiday.     Follow Up  Future Appointments   Date Time Provider Timothy eBal   2020 12:00 PM LILLIE Golden - CNP FF SLEEP MED [facility]   10/15/2020  8:00 AM MD KATIE Luna ORTHO DAR Latif RN

## 2020-09-07 LAB
ANION GAP SERPL CALCULATED.3IONS-SCNC: 15 MMOL/L (ref 3–16)
BASOPHILS ABSOLUTE: 0.2 K/UL (ref 0–0.2)
BASOPHILS RELATIVE PERCENT: 2.6 %
BUN BLDV-MCNC: 12 MG/DL (ref 7–20)
C-REACTIVE PROTEIN: 15.7 MG/L (ref 0–5.1)
CALCIUM SERPL-MCNC: 9.2 MG/DL (ref 8.3–10.6)
CHLORIDE BLD-SCNC: 101 MMOL/L (ref 99–110)
CO2: 22 MMOL/L (ref 21–32)
CREAT SERPL-MCNC: 1.1 MG/DL (ref 0.9–1.3)
EOSINOPHILS ABSOLUTE: 0.2 K/UL (ref 0–0.6)
EOSINOPHILS RELATIVE PERCENT: 3 %
GFR AFRICAN AMERICAN: >60
GFR NON-AFRICAN AMERICAN: >60
GLUCOSE BLD-MCNC: 71 MG/DL (ref 70–99)
HCT VFR BLD CALC: 36.7 % (ref 40.5–52.5)
HEMOGLOBIN: 11.6 G/DL (ref 13.5–17.5)
LYMPHOCYTES ABSOLUTE: 1.7 K/UL (ref 1–5.1)
LYMPHOCYTES RELATIVE PERCENT: 27.4 %
MCH RBC QN AUTO: 27.4 PG (ref 26–34)
MCHC RBC AUTO-ENTMCNC: 31.6 G/DL (ref 31–36)
MCV RBC AUTO: 86.6 FL (ref 80–100)
MONOCYTES ABSOLUTE: 0.5 K/UL (ref 0–1.3)
MONOCYTES RELATIVE PERCENT: 8.6 %
NEUTROPHILS ABSOLUTE: 3.7 K/UL (ref 1.7–7.7)
NEUTROPHILS RELATIVE PERCENT: 58.4 %
PDW BLD-RTO: 15.5 % (ref 12.4–15.4)
PLATELET # BLD: 412 K/UL (ref 135–450)
PMV BLD AUTO: 9.2 FL (ref 5–10.5)
POTASSIUM SERPL-SCNC: 5.3 MMOL/L (ref 3.5–5.1)
RBC # BLD: 4.23 M/UL (ref 4.2–5.9)
SEDIMENTATION RATE, ERYTHROCYTE: 10 MM/HR (ref 0–15)
SODIUM BLD-SCNC: 138 MMOL/L (ref 136–145)
TOTAL CK: 56 U/L (ref 39–308)
WBC # BLD: 6.3 K/UL (ref 4–11)

## 2020-09-09 RX ORDER — OXYCODONE HYDROCHLORIDE 5 MG/1
5 TABLET ORAL EVERY 4 HOURS PRN
Qty: 42 TABLET | Refills: 0 | Status: SHIPPED | OUTPATIENT
Start: 2020-09-09 | End: 2020-09-15 | Stop reason: SDUPTHER

## 2020-09-11 ENCOUNTER — VIRTUAL VISIT (OUTPATIENT)
Dept: PULMONOLOGY | Age: 50
End: 2020-09-11
Payer: COMMERCIAL

## 2020-09-11 PROCEDURE — 99214 OFFICE O/P EST MOD 30 MIN: CPT | Performed by: NURSE PRACTITIONER

## 2020-09-11 ASSESSMENT — SLEEP AND FATIGUE QUESTIONNAIRES
HOW LIKELY ARE YOU TO NOD OFF OR FALL ASLEEP WHILE WATCHING TV: 1
HOW LIKELY ARE YOU TO NOD OFF OR FALL ASLEEP WHEN YOU ARE A PASSENGER IN A CAR FOR AN HOUR WITHOUT A BREAK: 0
HOW LIKELY ARE YOU TO NOD OFF OR FALL ASLEEP WHILE SITTING AND READING: 0
ESS TOTAL SCORE: 3
HOW LIKELY ARE YOU TO NOD OFF OR FALL ASLEEP IN A CAR, WHILE STOPPED FOR A FEW MINUTES IN TRAFFIC: 0
HOW LIKELY ARE YOU TO NOD OFF OR FALL ASLEEP WHILE LYING DOWN TO REST IN THE AFTERNOON WHEN CIRCUMSTANCES PERMIT: 2
HOW LIKELY ARE YOU TO NOD OFF OR FALL ASLEEP WHILE SITTING AND TALKING TO SOMEONE: 0
HOW LIKELY ARE YOU TO NOD OFF OR FALL ASLEEP WHILE SITTING INACTIVE IN A PUBLIC PLACE: 0
HOW LIKELY ARE YOU TO NOD OFF OR FALL ASLEEP WHILE SITTING QUIETLY AFTER LUNCH WITHOUT ALCOHOL: 0

## 2020-09-11 NOTE — PROGRESS NOTES
Zulema Ledesma MD, FAASM, [facility]  Azeem Bangura, MSN, RN, CNP     [address] [facility] SLEEP MEDICINE  [address] [address]  Dept: [phone]  Dept Fax: [phone]: [address] [facility] SLEEP MEDICINE  [address] [place] [id number] [phone]    Subjective:     Patient ID: Arianne Stallworth is a 53 y.o. male. Chief Complaint   Patient presents with    Sleep Apnea       HPI:      Sleep Medicine Video Visit    Pursuant to the emergency declaration under the [facility], [facility] waiver authority and the [employer] Act this Telephone Visit was insisted, with patient's consent, to reduce the patient's risk of exposure to COVID-19 and provide continuity of care for an established patient. Services were provided through a synchronous discussion over a telephone and/or Video chat to substitute for in-person clinic visit, and coded as such. While patient is at home. Machine Modem/Download Info:  Compliance (hours/night): 7 hrs/night  Download AHI (/hour): 2.6 /HR     Average IPAP Pressure: 15.1 cmH2O  Average EPAP Pressure: 10.9 cmH2O         AUTO BIPAP - Settings (Christina)  IPAP Max: 25 cmH2O  EPAP Min: 10 cmH2O  Pressure Support Min: 3  Pressure Support Max: 7             Comfort Settings  Humidity Level (0-8): 1  Flex/EPR (0-3): 3 PAP Mask  Mask Type: Full Face mask     Magna - Total score: 3    Follow-up :     Last Visit : March 2020      Patient reports the listed chronic Co-morbidities: GERD, Allergies, Obesity    are well controlled and stable at this time.      Subjective Health Changes: Knee surgery with antibiotic usage 8/2020     Over Night Oximetry: [] Yes  [] No  [x] NA [] WNL   Using O2: [] Yes  [] No  [x] NA   Patient is compliant with the machine  [x] Yes  [] No   Feeling rested when using the machine   [x] Yes  [] No     Pressure is comfortable with inspiration and expiration  [x] Yes  [] No     Noticed changes in pressure   [] Yes  [] No  [x] NA   Mask is fitting well  [x] Yes  [] No   Noting Mask Air Leak  [] Yes  [x] No   Having painful Aerophagia  [] Yes  [x] No   Nocturia   1  per night. Having  HA upon waking  [] Yes  [x] No   Dry mouth upon waking   Dry Nose  Dry Eyes  [x] Yes  [] No   Congestion upon waking   [] Yes  [x] No    Nose Bleeds  [] Yes  [x] No   Using Sleep Aides  Ambien- 10mg nightly   Understands how to change humidification and/or tubing temperature for comfort while at home  [x] Yes  [] No     Difficulties falling asleep  [] Yes  [x] No   Difficulties staying asleep  [] Yes  [x] No   Approximate time to bed  9:30-10pm   Approximate wake time  12-1am   Taking Naps  frequent   If taking naps usual length  2+hours    If taking naps using the machine  [x] Yes  [] No  [] NA [] With and With out    Drowsy when driving  [] Yes  [x] No     Does patient carry a DOT/CDL  [] Yes  [x] No     Does patient carry FAA/[employer] License   [] Yes  [x] No      Any concerns noted with the machine at this time  [] Yes  [x] No        Diagnosis Orders   1. Obstructive sleep apnea,Severe -(on cpap)     2. Gastroesophageal reflux disease with esophagitis--on daily ppi     3. Class 1 obesity due to excess calories without serious comorbidity with body mass index (BMI) of 34.0 to 34.9 in adult     4. Seasonal allergic rhinitis, unspecified trigger         The chronic medical conditions listed are directly related to the primary diagnosis listed above. The management of the primary diagnosis affects the secondary diagnosis and vice versa. Assessment/Plan:     Insomnia-chronic intermittent--uses prn ambien--to be filled by sleep  Chronic- Stable. Cont meds     Gastroesophageal reflux disease with esophagitis--on daily ppi  Chronic- Stable.   Cont meds per PCP and other physicians. Class 1 obesity due to excess calories with body mass index (BMI) of 34.0 to 34.9 in adult  Chronic-Stable. Encouraged him to work on weight loss through diet and exercise. Allergic rhinitis, seasonal  Chronic- Stable. Cont meds per PCP and other physicians. Obstructive sleep apnea,Severe -(on cpap)  Reviewed compliance download with pt. Supplies and parts as needed for his machine. These are medically necessary. Continue medications per his PCP and other physicians. Limit caffeine use after 3pm.  Encouraged him to work on weight loss through diet and exercise. Diagnoses of Gastroesophageal reflux disease with esophagitis--on daily ppi, Class 1 obesity due to excess calories without serious comorbidity with body mass index (BMI) of 34.0 to 34.9 in adult, and Seasonal allergic rhinitis, unspecified trigger were pertinent to this visit. The chronic medical conditions listed are directly related to the primary diagnosis listed above. The management of the primary diagnosis affects the secondary diagnosis and vice versa. The primary encounter diagnosis was Obstructive sleep apnea,Severe -(on cpap). Diagnoses of Gastroesophageal reflux disease with esophagitis--on daily ppi, Class 1 obesity due to excess calories without serious comorbidity with body mass index (BMI) of 34.0 to 34.9 in adult, and Seasonal allergic rhinitis, unspecified trigger were also pertinent to this visit. The chronic medical conditions listed are directly related to the primary diagnosis listed above. The management of the primary diagnosis affects the secondary diagnosis and vice versa.      - Educated patient and reviewed compliance download with pt.    -Supplies and parts as needed for his machine, these are medically necessary.    - Patient using [employer] for supplies  -Continue medications per his PCP and other physicians.   -Limit caffeine use after 3pm.    -Encouraged him to work on weight loss

## 2020-09-14 ENCOUNTER — TELEPHONE (OUTPATIENT)
Dept: PULMONOLOGY | Age: 50
End: 2020-09-14

## 2020-09-14 ENCOUNTER — TELEPHONE (OUTPATIENT)
Dept: ORTHOPEDIC SURGERY | Age: 50
End: 2020-09-14

## 2020-09-14 LAB
ANION GAP SERPL CALCULATED.3IONS-SCNC: 8 MMOL/L (ref 6–18)
BASOPHILS ABSOLUTE: 0.1 THOU/MCL (ref 0–0.2)
BASOPHILS ABSOLUTE: 1 %
BUN BLDV-MCNC: 9 MG/DL (ref 8–26)
C-REACTIVE PROTEIN WIDE RANGE: 7 MG/L
CALCIUM SERPL-MCNC: 9.3 MG/DL (ref 8.5–10.4)
CHLORIDE BLD-SCNC: 104 MEQ/L (ref 98–111)
CO2: 27 MMOL/L (ref 21–31)
CREAT SERPL-MCNC: 1.04 MG/DL (ref 0.7–1.3)
CREATINE KINASE ISOENZYMES, SER/PLAS: 53 IU/L (ref 30–233)
EOSINOPHILS ABSOLUTE: 0.2 THOU/MCL (ref 0.03–0.45)
EOSINOPHILS RELATIVE PERCENT: 3 %
FUNGUS (MYCOLOGY) CULTURE: NORMAL
FUNGUS (MYCOLOGY) CULTURE: NORMAL
FUNGUS STAIN: NORMAL
FUNGUS STAIN: NORMAL
GFR AFRICAN AMERICAN: 95 ML/MIN/1.73 M2
GFR NON-AFRICAN AMERICAN: 83 ML/MIN/1.73 M2
GLUCOSE BLD-MCNC: 94 MG/DL (ref 70–99)
HCT VFR BLD CALC: 37.9 % (ref 40–50)
HEMOGLOBIN: 11.9 G/DL (ref 13.5–16.5)
LYMPHOCYTES ABSOLUTE: 2.1 THOU/MCL (ref 1–4)
LYMPHOCYTES RELATIVE PERCENT: 29 %
MCH RBC QN AUTO: 26.5 PG (ref 27–33)
MCHC RBC AUTO-ENTMCNC: 31.3 G/DL (ref 32–36)
MCV RBC AUTO: 84.8 FL (ref 82–97)
MONOCYTES # BLD: 7 %
MONOCYTES ABSOLUTE: 0.5 THOU/MCL (ref 0.2–0.9)
NEUTROPHILS ABSOLUTE: 4.6 THOU/MCL (ref 1.8–7.7)
PDW BLD-RTO: 15.5 % (ref 12.3–17)
PLATELET # BLD: 593 THOU/MCL (ref 140–375)
PMV BLD AUTO: 8.4 FL (ref 7.4–11.5)
POTASSIUM SERPL-SCNC: 4.5 MEQ/L (ref 3.6–5.1)
RBC # BLD: 4.46 MIL/MCL (ref 4.4–5.8)
SEDIMENTATION RATE, ERYTHROCYTE: 22 MM/HR (ref 0–15)
SEG NEUTROPHILS: 60 %
SODIUM BLD-SCNC: 139 MEQ/L (ref 135–145)
WBC # BLD: 7.5 THOU/MCL (ref 3.6–10.5)

## 2020-09-14 RX ORDER — ZOLPIDEM TARTRATE 10 MG/1
10 TABLET ORAL NIGHTLY PRN
Qty: 90 TABLET | Refills: 1 | Status: SHIPPED | OUTPATIENT
Start: 2020-09-14 | End: 2020-12-13

## 2020-09-14 RX ORDER — ZOLPIDEM TARTRATE 10 MG/1
10 TABLET ORAL NIGHTLY PRN
Qty: 30 TABLET | Refills: 1 | OUTPATIENT
Start: 2020-09-14 | End: 2020-10-14

## 2020-09-14 NOTE — TELEPHONE ENCOUNTER
Patient called to say the pharmacy does not have a refill for his Ambien. He only has 1 or 2 pills left. Patient uses [facility] on [address]. Any questions, please call [phone].

## 2020-09-14 NOTE — TELEPHONE ENCOUNTER
Pharm is requesting a refill on Zolpidem    Last appointment:  9/11/2020    Next appointment: 03/19/21

## 2020-09-15 RX ORDER — OXYCODONE HYDROCHLORIDE 5 MG/1
5 TABLET ORAL EVERY 4 HOURS PRN
Qty: 42 TABLET | Refills: 0 | Status: SHIPPED | OUTPATIENT
Start: 2020-09-15 | End: 2020-09-19 | Stop reason: SDUPTHER

## 2020-09-18 ENCOUNTER — APPOINTMENT (OUTPATIENT)
Dept: GENERAL RADIOLOGY | Age: 50
End: 2020-09-18
Payer: COMMERCIAL

## 2020-09-18 ENCOUNTER — APPOINTMENT (OUTPATIENT)
Dept: CT IMAGING | Age: 50
End: 2020-09-18
Payer: COMMERCIAL

## 2020-09-18 ENCOUNTER — HOSPITAL ENCOUNTER (EMERGENCY)
Age: 50
Discharge: HOME | End: 2020-09-18
Payer: COMMERCIAL

## 2020-09-18 VITALS
BODY MASS INDEX: 30.67 KG/M2 | HEIGHT: 72 IN | HEART RATE: 75 BPM | DIASTOLIC BLOOD PRESSURE: 78 MMHG | RESPIRATION RATE: 18 BRPM | OXYGEN SATURATION: 100 % | TEMPERATURE: 98.6 F | SYSTOLIC BLOOD PRESSURE: 127 MMHG | WEIGHT: 226.41 LBS

## 2020-09-18 LAB
A/G RATIO: 1.4 (ref 1.1–2.2)
ALBUMIN SERPL-MCNC: 4 G/DL (ref 3.4–5)
ALP BLD-CCNC: 150 U/L (ref 40–129)
ALT SERPL-CCNC: 10 U/L (ref 10–40)
ANION GAP SERPL CALCULATED.3IONS-SCNC: 9 MMOL/L (ref 3–16)
AST SERPL-CCNC: 15 U/L (ref 15–37)
BASOPHILS ABSOLUTE: 0.1 K/UL (ref 0–0.2)
BASOPHILS RELATIVE PERCENT: 1.1 %
BILIRUB SERPL-MCNC: 0.3 MG/DL (ref 0–1)
BILIRUBIN URINE: NEGATIVE
BLOOD, URINE: NEGATIVE
BUN BLDV-MCNC: 13 MG/DL (ref 7–20)
CALCIUM SERPL-MCNC: 9.5 MG/DL (ref 8.3–10.6)
CHLORIDE BLD-SCNC: 101 MMOL/L (ref 99–110)
CLARITY: CLEAR
CO2: 25 MMOL/L (ref 21–32)
COLOR: YELLOW
CREAT SERPL-MCNC: 0.9 MG/DL (ref 0.9–1.3)
EOSINOPHILS ABSOLUTE: 0.1 K/UL (ref 0–0.6)
EOSINOPHILS RELATIVE PERCENT: 1.1 %
GFR AFRICAN AMERICAN: >60
GFR NON-AFRICAN AMERICAN: >60
GLOBULIN: 2.8 G/DL
GLUCOSE BLD-MCNC: 92 MG/DL (ref 70–99)
GLUCOSE URINE: NEGATIVE MG/DL
HCT VFR BLD CALC: 38.7 % (ref 40.5–52.5)
HEMOGLOBIN: 12.4 G/DL (ref 13.5–17.5)
KETONES, URINE: NEGATIVE MG/DL
LEUKOCYTE ESTERASE, URINE: NEGATIVE
LIPASE: 30 U/L (ref 13–60)
LYMPHOCYTES ABSOLUTE: 2.2 K/UL (ref 1–5.1)
LYMPHOCYTES RELATIVE PERCENT: 26.2 %
MCH RBC QN AUTO: 27 PG (ref 26–34)
MCHC RBC AUTO-ENTMCNC: 32 G/DL (ref 31–36)
MCV RBC AUTO: 84.5 FL (ref 80–100)
MICROSCOPIC EXAMINATION: NORMAL
MONOCYTES ABSOLUTE: 0.6 K/UL (ref 0–1.3)
MONOCYTES RELATIVE PERCENT: 6.7 %
NEUTROPHILS ABSOLUTE: 5.4 K/UL (ref 1.7–7.7)
NEUTROPHILS RELATIVE PERCENT: 64.9 %
NITRITE, URINE: NEGATIVE
PDW BLD-RTO: 16.1 % (ref 12.4–15.4)
PH UA: 6.5 (ref 5–8)
PLATELET # BLD: 475 K/UL (ref 135–450)
PMV BLD AUTO: 8.3 FL (ref 5–10.5)
POTASSIUM REFLEX MAGNESIUM: 4.3 MMOL/L (ref 3.5–5.1)
PROTEIN UA: NEGATIVE MG/DL
RBC # BLD: 4.58 M/UL (ref 4.2–5.9)
SODIUM BLD-SCNC: 135 MMOL/L (ref 136–145)
SPECIFIC GRAVITY UA: 1.01 (ref 1–1.03)
TOTAL PROTEIN: 6.8 G/DL (ref 6.4–8.2)
URINE REFLEX TO CULTURE: NORMAL
URINE TYPE: NORMAL
UROBILINOGEN, URINE: 1 E.U./DL
WBC # BLD: 8.3 K/UL (ref 4–11)

## 2020-09-18 PROCEDURE — 74177 CT ABD & PELVIS W/CONTRAST: CPT

## 2020-09-18 PROCEDURE — 96374 THER/PROPH/DIAG INJ IV PUSH: CPT

## 2020-09-18 PROCEDURE — 6360000004 HC RX CONTRAST MEDICATION: Performed by: NURSE PRACTITIONER

## 2020-09-18 PROCEDURE — 2580000003 HC RX 258: Performed by: NURSE PRACTITIONER

## 2020-09-18 PROCEDURE — 80053 COMPREHEN METABOLIC PANEL: CPT

## 2020-09-18 PROCEDURE — 71045 X-RAY EXAM CHEST 1 VIEW: CPT

## 2020-09-18 PROCEDURE — 6360000002 HC RX W HCPCS: Performed by: NURSE PRACTITIONER

## 2020-09-18 PROCEDURE — 81003 URINALYSIS AUTO W/O SCOPE: CPT

## 2020-09-18 PROCEDURE — 96375 TX/PRO/DX INJ NEW DRUG ADDON: CPT

## 2020-09-18 PROCEDURE — 6370000000 HC RX 637 (ALT 250 FOR IP): Performed by: NURSE PRACTITIONER

## 2020-09-18 PROCEDURE — 36415 COLL VENOUS BLD VENIPUNCTURE: CPT

## 2020-09-18 PROCEDURE — 99284 EMERGENCY DEPT VISIT MOD MDM: CPT

## 2020-09-18 PROCEDURE — 83690 ASSAY OF LIPASE: CPT

## 2020-09-18 PROCEDURE — 85025 COMPLETE CBC W/AUTO DIFF WBC: CPT

## 2020-09-18 RX ORDER — ONDANSETRON 2 MG/ML
4 INJECTION INTRAMUSCULAR; INTRAVENOUS ONCE
Status: COMPLETED | OUTPATIENT
Start: 2020-09-18 | End: 2020-09-18

## 2020-09-18 RX ORDER — OXYCODONE HYDROCHLORIDE AND ACETAMINOPHEN 5; 325 MG/1; MG/1
1 TABLET ORAL ONCE
Status: COMPLETED | OUTPATIENT
Start: 2020-09-18 | End: 2020-09-18

## 2020-09-18 RX ORDER — 0.9 % SODIUM CHLORIDE 0.9 %
1000 INTRAVENOUS SOLUTION INTRAVENOUS ONCE
Status: COMPLETED | OUTPATIENT
Start: 2020-09-18 | End: 2020-09-18

## 2020-09-18 RX ADMIN — IOPAMIDOL 75 ML: 755 INJECTION, SOLUTION INTRAVENOUS at 21:20

## 2020-09-18 RX ADMIN — OXYCODONE HYDROCHLORIDE AND ACETAMINOPHEN 1 TABLET: 5; 325 TABLET ORAL at 21:38

## 2020-09-18 RX ADMIN — HYDROMORPHONE HYDROCHLORIDE 1 MG: 1 INJECTION, SOLUTION INTRAMUSCULAR; INTRAVENOUS; SUBCUTANEOUS at 18:33

## 2020-09-18 RX ADMIN — ONDANSETRON 4 MG: 2 INJECTION INTRAMUSCULAR; INTRAVENOUS at 18:33

## 2020-09-18 RX ADMIN — IOHEXOL 50 ML: 240 INJECTION, SOLUTION INTRATHECAL; INTRAVASCULAR; INTRAVENOUS; ORAL at 21:20

## 2020-09-18 RX ADMIN — SODIUM CHLORIDE 1000 ML: 9 INJECTION, SOLUTION INTRAVENOUS at 18:33

## 2020-09-18 ASSESSMENT — PAIN DESCRIPTION - ONSET: ONSET: ON-GOING

## 2020-09-18 ASSESSMENT — PAIN DESCRIPTION - LOCATION
LOCATION: ABDOMEN

## 2020-09-18 ASSESSMENT — PAIN SCALES - GENERAL
PAINLEVEL_OUTOF10: 7
PAINLEVEL_OUTOF10: 8
PAINLEVEL_OUTOF10: 6
PAINLEVEL_OUTOF10: 6
PAINLEVEL_OUTOF10: 8
PAINLEVEL_OUTOF10: 9

## 2020-09-18 ASSESSMENT — ENCOUNTER SYMPTOMS
ABDOMINAL PAIN: 1
COLOR CHANGE: 0
COUGH: 0
CONSTIPATION: 0
ABDOMINAL DISTENTION: 0
SHORTNESS OF BREATH: 0
BLOOD IN STOOL: 0
VOMITING: 0
BACK PAIN: 0
DIARRHEA: 0
NAUSEA: 1

## 2020-09-18 ASSESSMENT — PAIN DESCRIPTION - PAIN TYPE
TYPE: ACUTE PAIN

## 2020-09-18 ASSESSMENT — PAIN - FUNCTIONAL ASSESSMENT
PAIN_FUNCTIONAL_ASSESSMENT: 0-10
PAIN_FUNCTIONAL_ASSESSMENT: PREVENTS OR INTERFERES SOME ACTIVE ACTIVITIES AND ADLS

## 2020-09-18 ASSESSMENT — PAIN DESCRIPTION - ORIENTATION: ORIENTATION: RIGHT;UPPER

## 2020-09-18 ASSESSMENT — PAIN DESCRIPTION - DESCRIPTORS: DESCRIPTORS: DISCOMFORT;CRAMPING;CONSTANT

## 2020-09-18 ASSESSMENT — PAIN DESCRIPTION - PROGRESSION: CLINICAL_PROGRESSION: NOT CHANGED

## 2020-09-18 ASSESSMENT — PAIN DESCRIPTION - FREQUENCY: FREQUENCY: CONTINUOUS

## 2020-09-18 NOTE — ED NOTES
Pt ambulated to restroom to provide urine specimen at this time.      Kiarra Aguila [facility]  09/18/20 [id number]

## 2020-09-18 NOTE — ED PROVIDER NOTES
**ADVANCED PRACTICE PROVIDER, I HAVE EVALUATED THIS PATIENT**        [address]      Pt Name: Nayely Pruett  VTN:[id number]  [place] 1970  Date of evaluation: 9/18/2020  Provider: LILLIE Wagner - CNP      Chief Complaint:    Chief Complaint   Patient presents with    Abdominal Pain     right sided abd pain. pt constipated. nausea. last BM 9/14/2020. Nursing Notes, Past Medical Hx, Past Surgical Hx, Social Hx, Allergies, and Family Hx were all reviewed and agreed with or any disagreements were addressed in the HPI.    HPI:  (Location, Duration, Timing, Severity, Quality, Assoc Sx, Context, Modifying factors)  This is a  53 y.o. male with PMH significant for chronic abdominal pain, abdominal surgeries and gastric bypass who presents to the emergency department today complaining of right-sided abdominal pain with nausea. Onset was 3 days ago. Symptoms started spontaneously and have been worsening. No exacerbating or alleviating factors. Normal bowels. No chest pain or shortness of breath. No fevers. No urinary symptoms. Patient is getting prophylactic antibiotics for an upcoming right knee revision by Dr. Lily Walters.       PastMedical/Surgical History:      Diagnosis Date    Alcoholism [facility])     last drink 2015    Allergic migraine with status migrainosus     Allergic rhinitis, seasonal     Anemia     Arthritis     right knee    Vaughn's esophagus     Benign intracranial hypertension     Cancer (HCC)     renal     Carpal tunnel syndrome     Chronic pain     Depression     Depression     GERD (gastroesophageal reflux disease)     Headache(784.0)     History of blood transfusion     History of tobacco use     Quit 7/2014    Migraine     chronic for 1 year    Morbid obesity ([address]     Movement disorder     Onychomycosis     Sleep apnea, obstructive     Severe uses cpap stop bang 6    Unspecified cerebral artery occlusion with cerebral infarction 2014    slight weakness in left arm    Wears dentures     full set    Wears glasses          Procedure Laterality Date    ABDOMEN SURGERY      gastric bypass    ABDOMEN SURGERY  4-7-2016    repair of recurrent incisional hernia with mesh, removal of old mesh, bilateral component separation    ABDOMINAL EXPLORATION SURGERY  1/11/16    exp lap, lysis of adhesions, small bowel resection    CARPAL TUNNEL RELEASE      bilat    DILATATION, ESOPHAGUS      ENDOSCOPY, COLON, DIAGNOSTIC      EYE SURGERY      GASTRIC BYPASS SURGERY  Jan 2009    Has lost about 200 pounds.  HERNIA REPAIR  3-    ventral    JOINT REPLACEMENT      KIDNEY REMOVAL Left 08/01/2018    KNEE ARTHROSCOPY Right 7/11/2013    Dr.Robert WaltersAdelina     KNEE ARTHROSCOPY Right 7/11/12    RIGHT KNEE ARTHROSCOPY WITH CHONDROPLASTY    KNEE SURGERY  July 2012    right, arthroscopy    KNEE SURGERY Right 2/18/2020    INCISION AND DRAINAGE RIGHT KNEE AND WOULD VAC PLACEMENT performed by Junaid Fowler MD at [address]  2005    OTHER SURGICAL HISTORY Left 03/08/2016    CT biopsy ablasion left kidney    OTHER SURGICAL HISTORY  2016    small intestine 12 inch removed, 2 hernia surgeries, another surgery to drain infection from incision.      REVISION TOTAL KNEE ARTHROPLASTY Right 12/17/2019    RIGHT REVISION TIBIA TOTAL KNEE REPLACEMENT performed by Junaid Fowler MD at [address] Right 1/22/2020    RIGHT KNEE IRRIGATION AND DEBRIDEMENT WITH POLY EXCHANGE performed by Demetrius Delong MD at [facility]  10/15/13    RIGHT    TOTAL KNEE ARTHROPLASTY Right 8/12/2020    RIGHT ARTHROPLASY  RESECTION WITH INSERTION OF SPACER performed by Junaid Fowler MD at [facility].  6-7-2016    UPPER GASTROINTESTINAL ENDOSCOPY  04/02/2018 headaches. Hematological: Negative for adenopathy. Psychiatric/Behavioral: Negative for confusion. All other systems reviewed and are negative. Positives and Pertinent negatives as per HPI. Except as noted above in the ROS, problem specific ROS was completed and is negative. Physical Exam:  Physical Exam  Vitals signs and nursing note reviewed. Constitutional:       General: He is not in acute distress. Appearance: Normal appearance. He is well-developed. He is not toxic-appearing. HENT:      Head: Normocephalic and atraumatic. Eyes:      General: No scleral icterus. Conjunctiva/sclera: Conjunctivae normal.   Neck:      Musculoskeletal: Normal range of motion. Vascular: No JVD. Cardiovascular:      Rate and Rhythm: Normal rate and regular rhythm. Heart sounds: Normal heart sounds. Pulmonary:      Effort: Pulmonary effort is normal. No respiratory distress. Breath sounds: Normal breath sounds. Abdominal:      General: Bowel sounds are normal. There is no distension. Palpations: Abdomen is soft. Abdomen is not rigid. Tenderness: There is abdominal tenderness. There is no right CVA tenderness, guarding or rebound. Musculoskeletal: Normal range of motion. Skin:     General: Skin is warm and dry. Capillary Refill: Capillary refill takes less than 2 seconds. Findings: No rash. Neurological:      General: No focal deficit present. Mental Status: He is alert and oriented to person, place, and time. Cranial Nerves: Cranial nerves are intact.    Psychiatric:         Mood and Affect: Mood normal.         MEDICAL DECISION MAKING    Vitals:    Vitals:    09/18/20 1720 09/18/20 1815 09/18/20 1845 09/18/20 1930   BP: 120/77 123/68 128/79 129/87   Pulse: 69  62    Resp: 17  17    Temp: 98.6 °F (37 °C)      TempSrc: Tympanic      SpO2: 98% 98% 96% 99%   Weight:       Height:           LABS:  Labs Reviewed   CBC WITH AUTO DIFFERENTIAL - Abnormal; Notable for the following components:       Result Value    Hemoglobin 12.4 (*)     Hematocrit 38.7 (*)     RDW 16.1 (*)     Platelets 784 (*)     All other components within normal limits    Narrative:     Performed at:  [place] [address] [place], [place] [address]   Phone [phone]   COMPREHENSIVE METABOLIC PANEL W/ REFLEX TO MG FOR LOW K - Abnormal; Notable for the following components:    Sodium 135 (*)     Alkaline Phosphatase 150 (*)     All other components within normal limits    Narrative:     Performed at:  [place] [address] [place], [place] [address]   Phone [phone]   LIPASE    Narrative:     Performed at:  [address] [address]   Phone [phone]   URINE RT REFLEX TO CULTURE    Narrative:     Performed at:  [address] [address]   Phone ([phone] of labs reviewed and werenegative at this time or not returned at the time of this note. RADIOLOGY:   Non-plain film images such as CT, Ultrasound and MRI are read by the radiologist. LILLIE Davis CNP have directly visualized the radiologic plain film image(s) with the below findings:        Interpretation per the Radiologist below, if available at the time of this note:    CT ABDOMEN PELVIS W IV CONTRAST Additional Contrast? Oral   Preliminary Result   No acute abdominopelvic findings. Cholelithiasis without evidence of acute cholecystitis,, choledocholithiasis   or bile duct dilatation. Stable postsurgical changes of Darrian-en-Y gastric bypass and left nephrectomy. XR CHEST PORTABLE   Final Result   Right-sided PICC line terminates in the subclavian vein. Xr Knee Right (1-2 Views)    Result Date: 9/3/2020  Radiology exam is complete. No Radiologist dictation. Please follow up with ordering provider. Xr Chest Portable    Result Date: 9/18/2020  EXAMINATION: ONE XRAY VIEW OF THE CHEST 9/18/2020 5:49 pm COMPARISON: April 11, 2020 HISTORY: ORDERING SYSTEM PROVIDED HISTORY: picc placement TECHNOLOGIST PROVIDED HISTORY: Reason for exam:->picc placement Reason for Exam: Abdominal Pain Acuity: Acute Type of Exam: Initial FINDINGS: Right-sided PICC line terminates in the subclavian vein between the clavicle and anterior 1st rib. No consolidation. No pleural effusion. Right-sided PICC line terminates in the subclavian vein. MEDICAL DECISION MAKING / ED COURSE:      PROCEDURES:   Procedures    None    Patient was given:  Medications   HYDROmorphone (DILAUDID) injection 1 mg (1 mg Intravenous Given 9/18/20 1833)   ondansetron (ZOFRAN) injection 4 mg (4 mg Intravenous Given 9/18/20 1833)   0.9 % sodium chloride bolus (0 mLs Intravenous Stopped 9/18/20 1936)   iohexol (OMNIPAQUE 240) injection 50 mL (50 mLs Oral Given 9/18/20 2120)   iopamidol (ISOVUE-370) 76 % injection 75 mL (75 mLs Intravenous Given 9/18/20 2120)   oxyCODONE-acetaminophen (PERCOCET) 5-325 MG per tablet 1 tablet (1 tablet Oral Given 9/18/20 2138)       Differential diagnosis: Abdominal Aortic Aneurysm, Acute Coronary Syndrome, Ischemic Bowel, Bowel Obstruction (including Gastric Outlet Obstruction), PUD, GERD, Acute Cholecystitis, Pancreatitis, Hepatitis, Colitis, SMA Syndrome, Mesenteric Steal Syndrome, Splanchnic Vein Thrombosis, Appendicitis, Diverticulitis, Pyelonephritis, UTI, STD, Gonad Torsion, other     Patient seen and examined today for right-sided abdominal pain. See HPI for patient presentation. Patient is hemodynamically stable, nontoxic, afebrile, and without tachycardia, tachypnea, and hypoxia. Physical exam as above. 53year-old lying in bed in no acute distress. Right-sided TTP. Abdomen soft without rigidity guarding or peritoneal signs.   Lungs CTA and cardiac exam normal. CT abdomen pelvis shows no acute abnormality. Urine shows no infection blood or ketones. Lipase normal.  CBC and chemistry unremarkable. Patient given pain medication and continually asking for more. Patient resting comfortably my reexam.  Abdomen nonsurgical.  This could be biliary colic but patient has a history of chronic abdominal pain. Discharged home in stable condition. At this time, the evidence for any other entities in the differential is insufficient to justify any further testing. This was explained to the patient. The patient was advised that persistent or worsening symptoms will require further evaluation. I discussed with Melissa Gillette and/or family the exam results, diagnosis, care, prognosis, reasons to return and the importance of follow up. Patient and/or family is in full agreement with plan and all questions have been answered. Specific discharge instructions explained, including reasons to return to the emergency department. Melissa Gillette is well appearing, non-toxic, and afebrile at the time of discharge. Patient was instructed to follow up with primary care provider in 24-48 hours, and to instructed to return to ED immediately for any new or worsening concerns. Melissa Gillette verbalized understanding and discharged home. The patient tolerated their visit well. I evaluated the patient. The physician was available for consultation as needed. The patient and / or the family were informed of the results of any tests, a time was given to answer questions, a plan was proposed and they agreed with plan. CLINICAL IMPRESSION:  1.  Chronic abdominal pain        DISPOSITION Decision To Discharge 09/18/2020 10:18:57 PM      PATIENT REFERRED TO:  Antionette Bajwa MD  [address] [place] [address]  [phone]    Schedule an appointment as soon as possible for a visit       [facility] Emergency Department  [address] [place] [address] [address] [place] [address]  [phone]  Go to   As needed      DISCHARGE MEDICATIONS:  New Prescriptions    No medications on file       DISCONTINUED MEDICATIONS:  Discontinued Medications    No medications on file              (Please note the MDM and HPI sections of this note were completed with a voice recognition program.  Efforts were made to edit the dictations but occasionally words are mis-transcribed.)    Electronically signed, LILILE Romo CNP,          LILLIE Romo CNP  09/18/20 8734

## 2020-09-18 NOTE — ED TRIAGE NOTES
Pt presents to ED with c/o of abd pain with nausea. Pt concerned about bowel obstruction due to abd surgeries. Reports last BM 9/14. Resp even and unlabored. A/ox4. No acute distress noted. Denies any need at this time. Call light within reach. Bed in lowest position. Will continue to monitor.

## 2020-09-19 NOTE — ED NOTES
Pt resting at this time. Pt is requesting more pain medication. Provider is aware.      Radha Hernandez RN  09/18/20 2009

## 2020-09-19 NOTE — ED NOTES
D/C: Order noted for d/c. Pt confirmed d/c paperwork does have correct name. Discharge and education instructions reviewed with patient. Teach-back successful. Pt verbalized understanding and signed d/c papers. Pt denied questions at this time. No acute distress noted. Patient instructed to follow-up as noted - return to emergency department if symptoms worsen. Patient verbalized understanding. Discharged per EDMD with discharge instructions. Pt discharged to private vehicle. Patient stable upon departure. Thanked patient for choosing [facility] for care. Provider aware of patient pain at time of discharge.      Cookie Corbett RN  09/18/20 [id number]

## 2020-09-21 ENCOUNTER — CARE COORDINATION (OUTPATIENT)
Dept: CASE MANAGEMENT | Age: 50
End: 2020-09-21

## 2020-09-21 ENCOUNTER — OFFICE VISIT (OUTPATIENT)
Dept: ORTHOPEDIC SURGERY | Age: 50
End: 2020-09-21
Payer: COMMERCIAL

## 2020-09-21 VITALS — BODY MASS INDEX: 31.15 KG/M2 | WEIGHT: 230 LBS | TEMPERATURE: 98.1 F | HEIGHT: 72 IN

## 2020-09-21 LAB
ANION GAP SERPL CALCULATED.3IONS-SCNC: 9 MMOL/L (ref 6–18)
BASOPHILS ABSOLUTE: 0.1 THOU/MCL (ref 0–0.2)
BASOPHILS ABSOLUTE: 1 %
BUN BLDV-MCNC: 11 MG/DL (ref 8–26)
C-REACTIVE PROTEIN WIDE RANGE: 10 MG/L
CALCIUM SERPL-MCNC: 9.4 MG/DL (ref 8.5–10.4)
CHLORIDE BLD-SCNC: 103 MEQ/L (ref 98–111)
CO2: 27 MMOL/L (ref 21–31)
CREAT SERPL-MCNC: 1.08 MG/DL (ref 0.7–1.3)
CREATINE KINASE ISOENZYMES, SER/PLAS: 45 IU/L (ref 30–233)
EOSINOPHILS ABSOLUTE: 0.2 THOU/MCL (ref 0.03–0.45)
EOSINOPHILS RELATIVE PERCENT: 3 %
GFR AFRICAN AMERICAN: 91 ML/MIN/1.73 M2
GFR NON-AFRICAN AMERICAN: 79 ML/MIN/1.73 M2
GLUCOSE BLD-MCNC: 114 MG/DL (ref 70–99)
HCT VFR BLD CALC: 40.6 % (ref 40–50)
HEMOGLOBIN: 12.7 G/DL (ref 13.5–16.5)
LYMPHOCYTES ABSOLUTE: 2.3 THOU/MCL (ref 1–4)
LYMPHOCYTES RELATIVE PERCENT: 27 %
MCH RBC QN AUTO: 26.6 PG (ref 27–33)
MCHC RBC AUTO-ENTMCNC: 31.2 G/DL (ref 32–36)
MCV RBC AUTO: 85.4 FL (ref 82–97)
MONOCYTES # BLD: 6 %
MONOCYTES ABSOLUTE: 0.5 THOU/MCL (ref 0.2–0.9)
NEUTROPHILS ABSOLUTE: 5.4 THOU/MCL (ref 1.8–7.7)
PDW BLD-RTO: 15.9 % (ref 12.3–17)
PLATELET # BLD: 512 THOU/MCL (ref 140–375)
PMV BLD AUTO: 8.7 FL (ref 7.4–11.5)
POTASSIUM SERPL-SCNC: 4.8 MEQ/L (ref 3.6–5.1)
RBC # BLD: 4.76 MIL/MCL (ref 4.4–5.8)
SEDIMENTATION RATE, ERYTHROCYTE: 21 MM/HR (ref 0–15)
SEG NEUTROPHILS: 63 %
SODIUM BLD-SCNC: 139 MEQ/L (ref 135–145)
WBC # BLD: 8.5 THOU/MCL (ref 3.6–10.5)

## 2020-09-21 PROCEDURE — L1812 KO ELASTIC W/JOINTS PRE OTS: HCPCS | Performed by: PHYSICIAN ASSISTANT

## 2020-09-21 PROCEDURE — 99024 POSTOP FOLLOW-UP VISIT: CPT | Performed by: PHYSICIAN ASSISTANT

## 2020-09-21 RX ORDER — OXYCODONE HYDROCHLORIDE 5 MG/1
5 TABLET ORAL EVERY 4 HOURS PRN
Qty: 42 TABLET | Refills: 0 | Status: SHIPPED | OUTPATIENT
Start: 2020-09-21 | End: 2020-09-25 | Stop reason: SDUPTHER

## 2020-09-21 NOTE — TELEPHONE ENCOUNTER
----- Message from Yamila Wolf sent at 9/21/2020 11:53 AM EDT -----  Subject: Refill Request    QUESTIONS  Name of Medication? traZODone (DESYREL) 100 MG tablet  Patient-reported dosage and instructions? 2 100 mg tablets at bed time   How many days do you have left? 10  Preferred Pharmacy? [facility] [address] [place] [address]  Pharmacy phone number (if available)? [phone]  Additional Information for Provider? Has enough for the week  ---------------------------------------------------------------------------  --------------  CALL BACK INFO  What is the best way for the office to contact you? OK to leave message on   voicemail  Preferred Call Back Phone Number?  [phone]

## 2020-09-21 NOTE — PROGRESS NOTES
This dictation was done with [employer] dictation and may contain mechanical errors related to translation. Temperature 98.1 °F (36.7 °C), height 6' (1.829 m), weight 230 lb (104.3 kg). 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 He has had some increasing abilities to do things but now his knee feels like it is shifting her giving out on him at times he has swelling and soreness there is no signs of infection the incision is healed neurologically is intact distally. On examination he has 1-2+ edema with swelling he has noticeable play with varus valgus posterior and anterior motion. He has fair quad tone is good symmetric motion through the hips with a negative straight leg raise at 40. Impression is stable knee spacer with laxity. At this point I am going to place him into a economy hinged brace that he can use for weightbearing activities and take it off when he does not need it. He is to continue doing some straight leg raises and some quad strengthening exercises. I sent him for x-rays including a standing AP lateral sunrise view of his knee. There is no significant changes from previous x-rays the spacers intact the femoral component looks good the patella tracking is appropriate. Procedures    Breg Economy Hinged Knee WrapAround Brace     Patient was prescribed a Breg Economy Hinged Wrap Around Knee Brace. The right knee will require stabilization / immobilization from this semi-rigid / rigid orthosis to improve their function. The orthosis will assist in protecting the affected area, provide functional support and facilitate healing. The patient was educated and fit by a healthcare professional with expert knowledge and specialization in brace application while under the direct supervision of the treating physician.   Verbal and written instructions for the use of and

## 2020-09-21 NOTE — TELEPHONE ENCOUNTER
----- Message from Zeb Tai sent at 9/21/2020 11:55 AM EDT -----  Subject: Message to Provider    QUESTIONS  Information for Provider? Patient wants to know from Dr Luz Elena Johnston about   Medical marijuana treatment for pain management.  ---------------------------------------------------------------------------  --------------  Libia DIALLO  What is the best way for the office to contact you? OK to leave message on   voicemail  Preferred Call Back Phone Number? [phone]  ---------------------------------------------------------------------------  --------------  SCRIPT ANSWERS  Relationship to Patient?  Self

## 2020-09-21 NOTE — CARE COORDINATION
[facility] Transitions Follow Up Call    2020    Patient: Johnny Grewal  Patient : 1970   MRN: [id number]  Reason for Admission:   Discharge Date: 20 RARS: Readmission Risk Score: 60         Spoke with: Thao Benavides (patient)    Final attempt at CTN follow up phone call. Spoke with [facility]. He states he is \"fine\". Writer mentioned recent ED visit. [facility] states \"that was a different matter - I am fine\". [facility] denied the need for any additional CTN calls. KARLA episode resolved. [facility] has CTN contact info - if needed.     Follow Up  Future Appointments   Date Time Provider Timothy Beal   10/15/2020  8:00 AM MD KATIE Shen ORTHO DAR   3/19/2021 12:20 PM LILLIE Virk - CNP FF SLEEP MED D[facility] Vee RN

## 2020-09-24 ENCOUNTER — TELEPHONE (OUTPATIENT)
Dept: FAMILY MEDICINE CLINIC | Age: 50
End: 2020-09-24

## 2020-09-24 RX ORDER — TRAZODONE HYDROCHLORIDE 100 MG/1
200 TABLET ORAL NIGHTLY
Qty: 180 TABLET | Refills: 0 | Status: SHIPPED | OUTPATIENT
Start: 2020-09-24 | End: 2020-10-08 | Stop reason: SDUPTHER

## 2020-09-24 NOTE — TELEPHONE ENCOUNTER
MB, can you let pt know I rf'd his trazodone. Also, as far as medical MJ goes, let him know I can't exactly recommend it because I don't know the data behind it. Anecdotally I have a number of pt's that use it and find it helpful. Let's get him an appt in next few months. Thanks.

## 2020-09-28 LAB
ANION GAP SERPL CALCULATED.3IONS-SCNC: 6 MMOL/L (ref 6–18)
BASOPHILS ABSOLUTE: 0.1 THOU/MCL (ref 0–0.2)
BASOPHILS ABSOLUTE: 1 %
BUN BLDV-MCNC: 11 MG/DL (ref 8–26)
C-REACTIVE PROTEIN WIDE RANGE: <5 MG/L
CALCIUM SERPL-MCNC: 8.7 MG/DL (ref 8.5–10.4)
CHLORIDE BLD-SCNC: 106 MEQ/L (ref 98–111)
CO2: 27 MMOL/L (ref 21–31)
CREAT SERPL-MCNC: 1.01 MG/DL (ref 0.7–1.3)
CREATINE KINASE ISOENZYMES, SER/PLAS: 50 IU/L (ref 30–233)
EOSINOPHILS ABSOLUTE: 0.4 THOU/MCL (ref 0.03–0.45)
EOSINOPHILS RELATIVE PERCENT: 7 %
GFR AFRICAN AMERICAN: 99 ML/MIN/1.73 M2
GFR NON-AFRICAN AMERICAN: 86 ML/MIN/1.73 M2
GLUCOSE BLD-MCNC: 86 MG/DL (ref 70–99)
HCT VFR BLD CALC: 37.8 % (ref 40–50)
HEMOGLOBIN: 11.9 G/DL (ref 13.5–16.5)
LYMPHOCYTES ABSOLUTE: 2 THOU/MCL (ref 1–4)
LYMPHOCYTES RELATIVE PERCENT: 32 %
MCH RBC QN AUTO: 26.4 PG (ref 27–33)
MCHC RBC AUTO-ENTMCNC: 31.4 G/DL (ref 32–36)
MCV RBC AUTO: 84.1 FL (ref 82–97)
MONOCYTES # BLD: 7 %
MONOCYTES ABSOLUTE: 0.5 THOU/MCL (ref 0.2–0.9)
NEUTROPHILS ABSOLUTE: 3.3 THOU/MCL (ref 1.8–7.7)
PDW BLD-RTO: 17.1 % (ref 12.3–17)
PLATELET # BLD: 394 THOU/MCL (ref 140–375)
PMV BLD AUTO: 9.1 FL (ref 7.4–11.5)
POTASSIUM SERPL-SCNC: 4.6 MEQ/L (ref 3.6–5.1)
RBC # BLD: 4.5 MIL/MCL (ref 4.4–5.8)
SEDIMENTATION RATE, ERYTHROCYTE: 8 MM/HR (ref 0–15)
SEG NEUTROPHILS: 53 %
SODIUM BLD-SCNC: 139 MEQ/L (ref 135–145)
WBC # BLD: 6.2 THOU/MCL (ref 3.6–10.5)

## 2020-09-28 RX ORDER — OXYCODONE HYDROCHLORIDE 5 MG/1
5 TABLET ORAL EVERY 4 HOURS PRN
Qty: 42 TABLET | Refills: 0 | Status: SHIPPED | OUTPATIENT
Start: 2020-09-28 | End: 2020-10-05 | Stop reason: SDUPTHER

## 2020-09-29 LAB
AFB CULTURE (MYCOBACTERIA): NORMAL
AFB CULTURE (MYCOBACTERIA): NORMAL
AFB SMEAR: NORMAL
AFB SMEAR: NORMAL

## 2020-09-30 ENCOUNTER — TELEPHONE (OUTPATIENT)
Dept: INFECTIOUS DISEASES | Age: 50
End: 2020-09-30

## 2020-10-02 ENCOUNTER — TELEPHONE (OUTPATIENT)
Dept: ORTHOPEDIC SURGERY | Age: 50
End: 2020-10-02

## 2020-10-05 RX ORDER — OXYCODONE HYDROCHLORIDE 5 MG/1
5 TABLET ORAL EVERY 4 HOURS PRN
Qty: 42 TABLET | Refills: 0 | Status: SHIPPED | OUTPATIENT
Start: 2020-10-05 | End: 2020-10-12

## 2020-10-05 RX ORDER — ATORVASTATIN CALCIUM 40 MG/1
40 TABLET, FILM COATED ORAL NIGHTLY
Qty: 90 TABLET | Refills: 0 | Status: SHIPPED | OUTPATIENT
Start: 2020-10-05 | End: 2021-03-18 | Stop reason: SDUPTHER

## 2020-10-08 ENCOUNTER — TELEPHONE (OUTPATIENT)
Dept: ORTHOPEDIC SURGERY | Age: 50
End: 2020-10-08

## 2020-10-08 ENCOUNTER — OFFICE VISIT (OUTPATIENT)
Dept: PSYCHIATRY | Age: 50
End: 2020-10-08
Payer: COMMERCIAL

## 2020-10-08 PROCEDURE — 99443 PR PHYS/QHP TELEPHONE EVALUATION 21-30 MIN: CPT | Performed by: PSYCHIATRY & NEUROLOGY

## 2020-10-08 RX ORDER — TRAZODONE HYDROCHLORIDE 100 MG/1
200 TABLET ORAL NIGHTLY
Qty: 180 TABLET | Refills: 0 | Status: SHIPPED | OUTPATIENT
Start: 2020-10-08 | End: 2021-03-10 | Stop reason: SDUPTHER

## 2020-10-08 RX ORDER — FLUOXETINE HYDROCHLORIDE 20 MG/1
20 CAPSULE ORAL DAILY
Qty: 90 CAPSULE | Refills: 1 | Status: SHIPPED | OUTPATIENT
Start: 2020-10-08 | End: 2020-12-03

## 2020-10-08 NOTE — TELEPHONE ENCOUNTER
OTHER  SX 08/12/20 TOTAL R KNEE  SHARP, BURNING PAIN 8/10  REQUESTING A CALL BACK   [id number]# [phone]

## 2020-10-08 NOTE — PROGRESS NOTES
Psych Follow Up Progress Note    10/8/20  Yaw Riojas  [id number]    I have reviewed recent documentation:  Yaw Riojas is a 53 y.o. male  This patient  has a past medical history of Alcoholism ([facility] [address] Allergic migraine with status migrainosus, Allergic rhinitis, seasonal, Anemia, Arthritis, Vaughn's esophagus, Benign intracranial hypertension, Cancer ([place] [address] Carpal tunnel syndrome, Chronic pain, Depression, Depression, GERD (gastroesophageal reflux disease), Headache(784.0), History of blood transfusion, History of tobacco use, Migraine, Morbid obesity ([facility] [address] Movement disorder, Onychomycosis, Sleep apnea, obstructive, Unspecified cerebral artery occlusion with cerebral infarction, Wears dentures, and Wears glasses. Chief Complaint   Patient presents with    Depression     I called this patient today because of safety concerns regarding coronavirus. Subjective/Interval Hx:  \"Hanging in there. \"  Has managed to  some work he can do at home. 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 As pt pieced some of this together, he got somewhat better. Ended up seeing a surgeon who is an expert in complex abdomens, and he said adhesion lysis may help. So he'll be doing that soon. Just lives in fear of more SBO. Location:  Mind  Severity:  mild  Context:  As above. Modifiers:  Chronic pain  Quality:  Anxiety, depression. Objective:    No results found for this or any previous visit (from the past 168 hour(s)). Current Outpatient Medications   Medication Sig Dispense Refill    oxyCODONE (ROXICODONE) 5 MG immediate release tablet Take 1 tablet by mouth every 4 hours as needed for Pain for up to 7 days.  42 tablet 0    atorvastatin (LIPITOR) 40 MG tablet Take 1 tablet by mouth nightly At bedtime 90 Patient is a 66y old  Female who presents with a chief complaint of Palpitations (12 Jul 2018 00:56)      INTERVAL HPI:  67 y/o F PMHX idiopathic ventricular tachycardia, breast cancer 9/2016 s/p surgery and radiation, chronic fatigue syndrome, degenerative disc disease, genital herpes, hiatal hernia presents with palpitations x2 days. Patient states the episodes of palpitations have been intermittent. Reports feeling her heart pounding against her chest last a few minutes at a time and occur at rest and movement. These episodes are similar to the patient's episode of idiopathic v tach about 10 years ago. At that time, patient experienced palpitations, but converted back to sinus. Patient states she has had these episodes on and off through the years, but they are becoming more frequent. Last episode was in 1/2018. At that time, patient was placed on a holter monitor for 30 days without any events. (+) chronic hot flashes. Denies fever, chills, SOB, CP, cough, abd pain, N/V/D, or urinary symptoms. No recent travel. No sick contacts. No change in weight.    In the ED, VS: afebrile, , /90, RR 18, sat 100% RA, cbc unremarkable, D-dimer neg, cmp unremarkable except for gluc 140, neg cardiac enzymes, EKG afib with RVR @ 173BPM. Received IV cardizem 10mg x2 and started on cardizem infusion. Cardio contacted in the ED. CXR prelim neg. In ED, patient became SOB and 's while on cardizem drip @ 15. Repeat ekg similar to prior - afib @ 130s. Called Dr. Myers - likely anxiety. Gave flexeril for anxiety (patient unable to take benzos).     7/12/18: Patient seen and examined at bedside. Patient reports minor headache and says her HR feels fast but no other complaints. Patient converted to NSR per tele and transitioned from Cardizem drip to PO. Troponins neg x 3. Cardiology following. S/P pt converted to NSR, Now on PO Cardizem , Full dose Lovenox 60 mg SC q 12 hrs as per cardiology.    7/13/18: Patient seen and examined at bedside. Patient reports feeling better today. She does report feeling lightheaded when walking this morning but states she had not had anything to drink or eat. Patient on 5 mg eliquis BID and cardizem  PO QD. Pt in NSR. Orthostatics performed.    OVERNIGHT EVENTS: No acute events overnight.     Home Medications:  acyclovir 400 mg oral tablet: 1 tab(s) orally 2 times a day (12 Jul 2018 02:14)  cyclobenzaprine 5 mg oral tablet: 1 tab(s) orally 3 times a day, As Needed (12 Jul 2018 02:14)  Tylenol PM 1000 mg-50 mg/30 mL oral liquid: 30 milliliter(s) orally once a day (at bedtime) (12 Jul 2018 02:14)      MEDICATIONS  (STANDING):  acyclovir   Tablet 400 milliGRAM(s) Oral two times a day  apixaban 5 milliGRAM(s) Oral every 12 hours  diltiazem    milliGRAM(s) Oral daily  pantoprazole    Tablet 40 milliGRAM(s) Oral before breakfast    MEDICATIONS  (PRN):  acetaminophen   Tablet. 650 milliGRAM(s) Oral at bedtime PRN insomnia  diphenhydrAMINE   Capsule 25 milliGRAM(s) Oral at bedtime PRN Insomnia      Allergies    adhesives (Unknown)  Cipro (Hepatotoxicity)  sulfa drugs (Rash)  tetracycline (Rash)  Valium (Short breath; Faint; Other)    Intolerances    Gluten (Stomach Upset)      REVIEW OF SYSTEMS:  CONSTITUTIONAL: No fever, No chills, No fatigue, No myalgia, No Body ache, No Weakness  EYES: No eye pain,  No visual disturbances, No discharge, NO Redness  ENMT:  No ear pain, No nose bleed, No vertigo; No sinus or throat pain, No Congestion  NECK: No pain, No stiffness  RESPIRATORY: No cough, wheezing, No  hemoptysis; SOB occurs with lightheadedness intermittently  CARDIOVASCULAR: No chest pain, palpitations  GASTROINTESTINAL: No abdominal or epigastric pain. No nausea, No vomiting; No diarrhea or constipation. [  ] BM  GENITOURINARY: No dysuria, No frequency, No urgency, No hematuria, or incontinence  NEUROLOGICAL: Lightheadness x 1 with ambulation; No headaches, No dizziness, No numbness, No tingling, No tremors, No weakness  EXT: No Swelling, No Pain, No Edema  SKIN:  [ x ] No itching, burning, rashes, or lesions   MUSCULOSKELETAL: No joint pain or swelling; No muscle pain, No back pain, No extremity pain  PSYCHIATRIC: No depression, anxiety, mood swings or difficulty sleeping at night  PAIN SCALE: [ x ] None  [  ] Other-  ROS Unable to obtain due to - [  ] Dementia  [  ] Lethargy  [  ] Sedated [  ] non verbal  REST OF REVIEW Of SYSTEM - [ x ] Normal     Vital Signs Last 24 Hrs  T(C): 36.6 (13 Jul 2018 07:50), Max: 36.9 (12 Jul 2018 19:45)  T(F): 97.8 (13 Jul 2018 07:50), Max: 98.4 (12 Jul 2018 19:45)  HR: 82 (13 Jul 2018 07:50) (70 - 82)  BP: 105/72 (13 Jul 2018 07:50) (103/71 - 123/64)  BP(mean): --  RR: 18 (13 Jul 2018 07:50) (17 - 18)  SpO2: 99% (13 Jul 2018 07:50) (96% - 99%)  Finger Stick        07-12 @ 07:01  -  07-13 @ 07:00  --------------------------------------------------------  IN: 400 mL / OUT: 1 mL / NET: 399 mL        PHYSICAL EXAM:  GENERAL:  [ x ] NAD , [ x ] well appearing, [  ] Agitated, [  ] Drowsy,  [  ] Lethargy, [  ] confused   HEAD:  [ x ] Normal, [  ] Other  EYES:  [ x ] EOMI, [ x ] PERRLA, [ x ] conjunctiva and sclera clear normal, [  ] Other,  [  ] Pallor,[  ] Discharge  ENMT:  [ x ] Normal, [ x ] Moist mucous membranes, [  ] Good dentition, [  ] No Thrush  NECK:  [ x ] Supple, [ x ] No JVD, [  ] Normal thyroid, [  ] Lymphadenopathy [  ] Other  CHEST/LUNG:  [ x ] Clear to auscultation bilaterally, [ x ] Breath Sounds,  [ x ] No rales, [ x ] No rhonchi  [ x ]  No wheezing  HEART:  [ x ] Regular rate and rhythm, [  ] tachycardia, [  ] Bradycardia,  [  ] irregular  [ x ] No murmurs, No rubs, No gallops, [  ] PPM in place (Mfr:  )  ABDOMEN:  [ x ] Soft, [ x ] Nontender, [ x ] Nondistended, [ x ] No mass, [ x ] Bowel sounds present, [  ] obese  NERVOUS SYSTEM:  [ x ] Alert & Oriented X3, [ x ] Nonfocal  [  ] Confusion  [  ] Encephalopathic [  ] Sedated [  ] Unable to assess, [  ] Other-  EXTREMITIES: [ x ] 2+ Peripheral Pulses, No clubbing, No cyanosis,  [  ] edema B/L lower EXT. [  ] PVD stasis skin changes B/L Lower EXT  LYMPH: No lymphadenopathy noted  SKIN:  [ x ] No rashes or lesions, [  ] Pressure Ulcers, [  ] ecchymosis, [  ] Skin Tears, [  ] Other    DIET: DASH/TLC    LABS:                        13.2   4.49  )-----------( 173      ( 13 Jul 2018 08:28 )             40.9     13 Jul 2018 08:28    144    |  107    |  13     ----------------------------<  80     4.6     |  30     |  0.74     Ca    9.0        13 Jul 2018 08:28    TPro  7.0    /  Alb  3.6    /  TBili  0.6    /  DBili  x      /  AST  22     /  ALT  24     /  AlkPhos  85     13 Jul 2018 08:28                  CARDIAC MARKERS ( 12 Jul 2018 12:40 )  <.015 ng/mL / x     / 114 U/L / x     / 1.7 ng/mL  CARDIAC MARKERS ( 12 Jul 2018 06:28 )  <.015 ng/mL / x     / 129 U/L / x     / 2.1 ng/mL  CARDIAC MARKERS ( 12 Jul 2018 00:12 )  <.015 ng/mL / x     / 170 U/L / x     / 2.1 ng/mL             Anemia Panel:      Thyroid Panel:  T4, Serum: 6.8 ug/dL (07-12-18 @ 07:42)  Thyroid Stimulating Hormone, Serum: 0.85 uIU/mL (07-12-18 @ 06:28)                RADIOLOGY & ADDITIONAL TESTS:    EXAM:  ECHO TTE W/O CON COMP W/DOPPLR         PROCEDURE DATE:  07/12/2018        INTERPRETATION:  INDICATION: palpitations  Referring M.D.:Kendra  Blood Pressure 108/72        Weight (kg) :63     Height (cm):172       BSA (sq m): 1.76  Technician: WINIFRED    Dimensions:    LA 2.1       Normal Values: 2.0 - 4.0 cm    Ao 3.1        Normal Values: 2.0 - 3.8 cm  SEPTUM 1.0       Normal Values: 0.6 - 1.2 cm  PWT 0.9       Normal Values: 0.6 - 1.1 cm  LVIDd 3.7         Normal Values: 3.0 - 5.6 cm  LVIDs 2.5         Normal Values: 1.8 - 4.0 cm      OBSERVATIONS:  Technically difficult study  Mitral Valve: Normal mitral annulus and valve. Trace mitral regurgitation.  Aortic Valve/Aorta: The aortic valve is not well visualized but probably   normal  Tricuspid Valve: Normal tricuspid valve. Trace tricuspid regurgitation.  Pulmonic Valve: The pulmonic valve is not well visualized. Probably   normal.  Left Atrium: Normal  Right Atrium: Normal  Left Ventricle: Endocardium is not well-visualized. Overall preserved   left ventricular systolic function. The EF is approximately 65%.  Right Ventricle: Normal right ventricular size and function.  Pericardium/Pleura: No pericardial effusion noted.  Pulmonary/RV Pressure: The right ventricular systolic pressure is   estimated to be 25mmHg, assuming that the right atrial pressure is   estimated to be 8 mmHg. This is consistent with normal pulmonary   pressures.  LV Diastolic Function: Normal diastolic function.    Conclusion: Technically difficult study. Overall preserved left   ventricular systolic function.        HEALTH ISSUES - PROBLEM Dx:  Prophylactic measure: Prophylactic measure  Genital herpes: Genital herpes  Chronic Fatigue Syndrome: Chronic Fatigue Syndrome  Idiopathic Ventricular Tachycardia: Idiopathic Ventricular Tachycardia  New onset atrial fibrillation: New onset atrial fibrillation          Consultant(s) Notes Reviewed:  [ x ] YES     Care Discussed with [X] Consultants  [  ] Patient  [  ] Family  [  ]   [  ] Social Service  [  ] RN, [  ] Physical Therapy  DVT PPX: [  ] Lovenox, [  ] S C Heparin, [  ] Coumadin, [  ] Xarelto, [ x ] Eliquis, [  ] Pradaxa, [  ] IV Heparin drip, [  ] SCD [  ] Contraindication 2 to GI Bleed,[  ] Ambulation  Advanced directive: [ x ] None, [  ] DNR/DNI Patient is a 66y old  Female who presents with a chief complaint of Palpitations (12 Jul 2018 00:56)      INTERVAL HPI:  67 y/o F PMHX idiopathic ventricular tachycardia, breast cancer 9/2016 s/p surgery and radiation, chronic fatigue syndrome, degenerative disc disease, genital herpes, hiatal hernia presents with palpitations x2 days. Patient states the episodes of palpitations have been intermittent. Reports feeling her heart pounding against her chest last a few minutes at a time and occur at rest and movement. These episodes are similar to the patient's episode of idiopathic v tach about 10 years ago. At that time, patient experienced palpitations, but converted back to sinus. Patient states she has had these episodes on and off through the years, but they are becoming more frequent. Last episode was in 1/2018. At that time, patient was placed on a holter monitor for 30 days without any events. (+) chronic hot flashes. Denies fever, chills, SOB, CP, cough, abd pain, N/V/D, or urinary symptoms. No recent travel. No sick contacts. No change in weight.    In the ED, VS: afebrile, , /90, RR 18, sat 100% RA, cbc unremarkable, D-dimer neg, cmp unremarkable except for gluc 140, neg cardiac enzymes, EKG afib with RVR @ 173BPM. Received IV cardizem 10mg x2 and started on cardizem infusion. Cardio contacted in the ED. CXR prelim neg. In ED, patient became SOB and 's while on cardizem drip @ 15. Repeat ekg similar to prior - afib @ 130s. Called Dr. Myers - likely anxiety. Gave flexeril for anxiety (patient unable to take benzos).     7/12/18: Patient seen and examined at bedside. Patient reports minor headache and says her HR feels fast but no other complaints. Patient converted to NSR per tele and transitioned from Cardizem drip to PO. Troponins neg x 3. Cardiology following. S/P pt converted to NSR, Now on PO Cardizem , Full dose Lovenox 60 mg SC q 12 hrs as per cardiology.    7/13/18: Patient seen and examined at bedside. Patient reports feeling better today. She does report feeling lightheaded when walking this morning but states she had not had anything to drink or eat. Patient on 5 mg eliquis BID and cardizem  PO QD. Pt in NSR. Orthostatics performed. VS stable, Pt feels better after eating.    OVERNIGHT EVENTS: No acute events overnight.     Home Medications:  acyclovir 400 mg oral tablet: 1 tab(s) orally 2 times a day (12 Jul 2018 02:14)  cyclobenzaprine 5 mg oral tablet: 1 tab(s) orally 3 times a day, As Needed (12 Jul 2018 02:14)  Tylenol PM 1000 mg-50 mg/30 mL oral liquid: 30 milliliter(s) orally once a day (at bedtime) (12 Jul 2018 02:14)      MEDICATIONS  (STANDING):  acyclovir   Tablet 400 milliGRAM(s) Oral two times a day  apixaban 5 milliGRAM(s) Oral every 12 hours  diltiazem    milliGRAM(s) Oral daily  pantoprazole    Tablet 40 milliGRAM(s) Oral before breakfast    MEDICATIONS  (PRN):  acetaminophen   Tablet. 650 milliGRAM(s) Oral at bedtime PRN insomnia  diphenhydrAMINE   Capsule 25 milliGRAM(s) Oral at bedtime PRN Insomnia      Allergies    adhesives (Unknown)  Cipro (Hepatotoxicity)  sulfa drugs (Rash)  tetracycline (Rash)  Valium (Short breath; Faint; Other)    Intolerances    Gluten (Stomach Upset)      REVIEW OF SYSTEMS: i feel fine   CONSTITUTIONAL: No fever, No chills, No fatigue, No myalgia, No Body ache, No Weakness  EYES: No eye pain,  No visual disturbances, No discharge, NO Redness  ENMT:  No ear pain, No nose bleed, No vertigo; No sinus or throat pain, No Congestion  NECK: No pain, No stiffness  RESPIRATORY: No cough, wheezing, No  hemoptysis; SOB occurs with lightheadedness intermittently  CARDIOVASCULAR: No chest pain, palpitations  GASTROINTESTINAL: No abdominal or epigastric pain. No nausea, No vomiting; No diarrhea or constipation. [  ] BM  GENITOURINARY: No dysuria, No frequency, No urgency, No hematuria, or incontinence  NEUROLOGICAL: Lightheadedness x 1 with ambulation; No headaches, No dizziness, No numbness, No tingling, No tremors, No weakness  EXT: No Swelling, No Pain, No Edema  SKIN:  [ x ] No itching, burning, rashes, or lesions   MUSCULOSKELETAL: No joint pain or swelling; No muscle pain, No back pain, No extremity pain  PSYCHIATRIC: No depression, anxiety, mood swings or difficulty sleeping at night  PAIN SCALE: [ x ] None  [  ] Other-  ROS Unable to obtain due to - [  ] Dementia  [  ] Lethargy  [  ] Sedated [  ] non verbal  REST OF REVIEW Of SYSTEM - [ x ] Normal     Vital Signs Last 24 Hrs  T(C): 36.6 (13 Jul 2018 07:50), Max: 36.9 (12 Jul 2018 19:45)  T(F): 97.8 (13 Jul 2018 07:50), Max: 98.4 (12 Jul 2018 19:45)  HR: 82 (13 Jul 2018 07:50) (70 - 82)  BP: 105/72 (13 Jul 2018 07:50) (103/71 - 123/64)  BP(mean): --  RR: 18 (13 Jul 2018 07:50) (17 - 18)  SpO2: 99% (13 Jul 2018 07:50) (96% - 99%)  Finger Stick        07-12 @ 07:01  -  07-13 @ 07:00  --------------------------------------------------------  IN: 400 mL / OUT: 1 mL / NET: 399 mL        PHYSICAL EXAM: OOB to chair  GENERAL:  [ x ] NAD , [ x ] well appearing, [  ] Agitated, [  ] Drowsy,  [  ] Lethargy, [  ] confused   HEAD:  [ x ] Normal, [  ] Other  EYES:  [ x ] EOMI, [ x ] PERRLA, [ x ] conjunctiva and sclera clear normal, [  ] Other,  [  ] Pallor,[  ] Discharge  ENMT:  [ x ] Normal, [ x ] Moist mucous membranes, [  ] Good dentition, [  ] No Thrush  NECK:  [ x ] Supple, [ x ] No JVD, [  ] Normal thyroid, [  ] Lymphadenopathy [  ] Other  CHEST/LUNG:  [ x ] Clear to auscultation bilaterally, [ x ] Breath Sounds,  [ x ] No rales, [ x ] No rhonchi  [ x ]  No wheezing  HEART:  [ x ] Regular rate and rhythm, [  ] tachycardia, [  ] Bradycardia,  [  ] irregular  [ x ] No murmurs, No rubs, No gallops, [  ] PPM in place (Mfr:  )  ABDOMEN:  [ x ] Soft, [ x ] Nontender, [ x ] Nondistended, [ x ] No mass, [ x ] Bowel sounds present, [  ] obese  NERVOUS SYSTEM:  [ x ] Alert & Oriented X3, [ x ] Nonfocal  [  ] Confusion  [  ] Encephalopathic [  ] Sedated [  ] Unable to assess, [  ] Other-  EXTREMITIES: [ x ] 2+ Peripheral Pulses, No clubbing, No cyanosis,  [  ] edema B/L lower EXT. [  ] PVD stasis skin changes B/L Lower EXT  LYMPH: No lymphadenopathy noted  SKIN:  [ x ] No rashes or lesions, [  ] Pressure Ulcers, [  ] ecchymosis, [  ] Skin Tears, [  ] Other    DIET: DASH/TLC    LABS:                        13.2   4.49  )-----------( 173      ( 13 Jul 2018 08:28 )             40.9     13 Jul 2018 08:28    144    |  107    |  13     ----------------------------<  80     4.6     |  30     |  0.74     Ca    9.0        13 Jul 2018 08:28    TPro  7.0    /  Alb  3.6    /  TBili  0.6    /  DBili  x      /  AST  22     /  ALT  24     /  AlkPhos  85     13 Jul 2018 08:28      CARDIAC MARKERS ( 12 Jul 2018 12:40 )  <.015 ng/mL / x     / 114 U/L / x     / 1.7 ng/mL  CARDIAC MARKERS ( 12 Jul 2018 06:28 )  <.015 ng/mL / x     / 129 U/L / x     / 2.1 ng/mL  CARDIAC MARKERS ( 12 Jul 2018 00:12 )  <.015 ng/mL / x     / 170 U/L / x     / 2.1 ng/mL    Thyroid Panel:  T4, Serum: 6.8 ug/dL (07-12-18 @ 07:42)  Thyroid Stimulating Hormone, Serum: 0.85 uIU/mL (07-12-18 @ 06:28)    RADIOLOGY & ADDITIONAL TESTS: NONE    EXAM:  ECHO TTE W/O CON COMP W/DOPPLR         PROCEDURE DATE:  07/12/2018        INTERPRETATION:  INDICATION: palpitations  Referring M.D.:Kendra  Blood Pressure 108/72        Weight (kg) :63     Height (cm):172       BSA (sq m): 1.76  Technician: WINIFRED    Dimensions:    LA 2.1       Normal Values: 2.0 - 4.0 cm    Ao 3.1        Normal Values: 2.0 - 3.8 cm  SEPTUM 1.0       Normal Values: 0.6 - 1.2 cm  PWT 0.9       Normal Values: 0.6 - 1.1 cm  LVIDd 3.7         Normal Values: 3.0 - 5.6 cm  LVIDs 2.5         Normal Values: 1.8 - 4.0 cm      OBSERVATIONS:  Technically difficult study  Mitral Valve: Normal mitral annulus and valve. Trace mitral regurgitation.  Aortic Valve/Aorta: The aortic valve is not well visualized but probably   normal  Tricuspid Valve: Normal tricuspid valve. Trace tricuspid regurgitation.  Pulmonic Valve: The pulmonic valve is not well visualized. Probably   normal.  Left Atrium: Normal  Right Atrium: Normal  Left Ventricle: Endocardium is not well-visualized. Overall preserved   left ventricular systolic function. The EF is approximately 65%.  Right Ventricle: Normal right ventricular size and function.  Pericardium/Pleura: No pericardial effusion noted.  Pulmonary/RV Pressure: The right ventricular systolic pressure is   estimated to be 25mmHg, assuming that the right atrial pressure is   estimated to be 8 mmHg. This is consistent with normal pulmonary   pressures.  LV Diastolic Function: Normal diastolic function.    Conclusion: Technically difficult study. Overall preserved left   ventricular systolic function.        HEALTH ISSUES - PROBLEM Dx:  Prophylactic measure: Prophylactic measure  Genital herpes: Genital herpes  Chronic Fatigue Syndrome: Chronic Fatigue Syndrome  Idiopathic Ventricular Tachycardia: Idiopathic Ventricular Tachycardia  New onset atrial fibrillation: New onset atrial fibrillation        Consultant(s) Notes Reviewed:  [ x ] YES     Care Discussed with [X] Consultants  [x  ] Patient  [ x Family  [  ]   [  ] Social Service  [ x ] RN, [  ] Physical Therapy  DVT PPX: [  ] Lovenox, [  ] S C Heparin, [  ] Coumadin, [  ] Xarelto, [ x ] Eliquis, [  ] Pradaxa, [  ] IV Heparin drip, [  ] SCD [  ] Contraindication 2 to GI Bleed,[  ] Ambulation  Advanced directive: [ x ] None, [  ] DNR/DNI

## 2020-10-10 ENCOUNTER — APPOINTMENT (OUTPATIENT)
Dept: CT IMAGING | Age: 50
End: 2020-10-10
Payer: COMMERCIAL

## 2020-10-10 ENCOUNTER — HOSPITAL ENCOUNTER (EMERGENCY)
Age: 50
Discharge: HOME | End: 2020-10-10
Attending: EMERGENCY MEDICINE
Payer: COMMERCIAL

## 2020-10-10 VITALS
RESPIRATION RATE: 18 BRPM | HEIGHT: 72 IN | BODY MASS INDEX: 30.94 KG/M2 | OXYGEN SATURATION: 99 % | HEART RATE: 65 BPM | TEMPERATURE: 98.6 F | WEIGHT: 228.4 LBS | DIASTOLIC BLOOD PRESSURE: 64 MMHG | SYSTOLIC BLOOD PRESSURE: 115 MMHG

## 2020-10-10 LAB
A/G RATIO: 1.5 (ref 1.1–2.2)
ALBUMIN SERPL-MCNC: 4.2 G/DL (ref 3.4–5)
ALP BLD-CCNC: 157 U/L (ref 40–129)
ALT SERPL-CCNC: 7 U/L (ref 10–40)
ANION GAP SERPL CALCULATED.3IONS-SCNC: 11 MMOL/L (ref 3–16)
AST SERPL-CCNC: 17 U/L (ref 15–37)
BASOPHILS ABSOLUTE: 0.1 K/UL (ref 0–0.2)
BASOPHILS RELATIVE PERCENT: 1 %
BILIRUB SERPL-MCNC: 0.4 MG/DL (ref 0–1)
BILIRUBIN URINE: NEGATIVE
BLOOD, URINE: NEGATIVE
BUN BLDV-MCNC: 7 MG/DL (ref 7–20)
CALCIUM SERPL-MCNC: 8.8 MG/DL (ref 8.3–10.6)
CHLORIDE BLD-SCNC: 105 MMOL/L (ref 99–110)
CLARITY: ABNORMAL
CO2: 26 MMOL/L (ref 21–32)
COLOR: YELLOW
CREAT SERPL-MCNC: 1 MG/DL (ref 0.9–1.3)
EOSINOPHILS ABSOLUTE: 0.3 K/UL (ref 0–0.6)
EOSINOPHILS RELATIVE PERCENT: 4.9 %
EPITHELIAL CELLS, UA: 0 /HPF (ref 0–5)
GFR AFRICAN AMERICAN: >60
GFR NON-AFRICAN AMERICAN: >60
GLOBULIN: 2.8 G/DL
GLUCOSE BLD-MCNC: 131 MG/DL (ref 70–99)
GLUCOSE URINE: NEGATIVE MG/DL
HCT VFR BLD CALC: 40.9 % (ref 40.5–52.5)
HEMOGLOBIN: 13 G/DL (ref 13.5–17.5)
HYALINE CASTS: 0 /LPF (ref 0–8)
KETONES, URINE: NEGATIVE MG/DL
LEUKOCYTE ESTERASE, URINE: NEGATIVE
LIPASE: 19 U/L (ref 13–60)
LYMPHOCYTES ABSOLUTE: 2.7 K/UL (ref 1–5.1)
LYMPHOCYTES RELATIVE PERCENT: 38.8 %
MCH RBC QN AUTO: 26.4 PG (ref 26–34)
MCHC RBC AUTO-ENTMCNC: 31.8 G/DL (ref 31–36)
MCV RBC AUTO: 83.1 FL (ref 80–100)
MICROSCOPIC EXAMINATION: YES
MONOCYTES ABSOLUTE: 0.4 K/UL (ref 0–1.3)
MONOCYTES RELATIVE PERCENT: 5.9 %
NEUTROPHILS ABSOLUTE: 3.4 K/UL (ref 1.7–7.7)
NEUTROPHILS RELATIVE PERCENT: 49.4 %
NITRITE, URINE: NEGATIVE
PDW BLD-RTO: 17.4 % (ref 12.4–15.4)
PH UA: 6.5 (ref 5–8)
PLATELET # BLD: 422 K/UL (ref 135–450)
PMV BLD AUTO: 8.2 FL (ref 5–10.5)
POTASSIUM REFLEX MAGNESIUM: 4.6 MMOL/L (ref 3.5–5.1)
PROTEIN UA: NEGATIVE MG/DL
RBC # BLD: 4.92 M/UL (ref 4.2–5.9)
RBC UA: 1 /HPF (ref 0–4)
SODIUM BLD-SCNC: 142 MMOL/L (ref 136–145)
SPECIFIC GRAVITY UA: 1.01 (ref 1–1.03)
TOTAL PROTEIN: 7 G/DL (ref 6.4–8.2)
URINE REFLEX TO CULTURE: ABNORMAL
URINE TYPE: ABNORMAL
UROBILINOGEN, URINE: 1 E.U./DL
WBC # BLD: 6.8 K/UL (ref 4–11)
WBC UA: 0 /HPF (ref 0–5)

## 2020-10-10 PROCEDURE — 96375 TX/PRO/DX INJ NEW DRUG ADDON: CPT

## 2020-10-10 PROCEDURE — 2580000003 HC RX 258: Performed by: EMERGENCY MEDICINE

## 2020-10-10 PROCEDURE — 83690 ASSAY OF LIPASE: CPT

## 2020-10-10 PROCEDURE — 74177 CT ABD & PELVIS W/CONTRAST: CPT

## 2020-10-10 PROCEDURE — 6360000004 HC RX CONTRAST MEDICATION: Performed by: EMERGENCY MEDICINE

## 2020-10-10 PROCEDURE — 6360000002 HC RX W HCPCS: Performed by: EMERGENCY MEDICINE

## 2020-10-10 PROCEDURE — 85025 COMPLETE CBC W/AUTO DIFF WBC: CPT

## 2020-10-10 PROCEDURE — 96374 THER/PROPH/DIAG INJ IV PUSH: CPT

## 2020-10-10 PROCEDURE — 36415 COLL VENOUS BLD VENIPUNCTURE: CPT

## 2020-10-10 PROCEDURE — 81001 URINALYSIS AUTO W/SCOPE: CPT

## 2020-10-10 PROCEDURE — 99284 EMERGENCY DEPT VISIT MOD MDM: CPT

## 2020-10-10 PROCEDURE — 80053 COMPREHEN METABOLIC PANEL: CPT

## 2020-10-10 RX ORDER — DICYCLOMINE HYDROCHLORIDE 10 MG/1
10 CAPSULE ORAL
Qty: 28 CAPSULE | Refills: 0 | Status: SHIPPED | OUTPATIENT
Start: 2020-10-10 | End: 2020-10-22

## 2020-10-10 RX ORDER — 0.9 % SODIUM CHLORIDE 0.9 %
1000 INTRAVENOUS SOLUTION INTRAVENOUS ONCE
Status: COMPLETED | OUTPATIENT
Start: 2020-10-10 | End: 2020-10-10

## 2020-10-10 RX ORDER — ONDANSETRON 4 MG/1
4 TABLET, ORALLY DISINTEGRATING ORAL 4 TIMES DAILY PRN
Qty: 15 TABLET | Refills: 0 | Status: SHIPPED | OUTPATIENT
Start: 2020-10-10 | End: 2020-10-15

## 2020-10-10 RX ORDER — ONDANSETRON 2 MG/ML
4 INJECTION INTRAMUSCULAR; INTRAVENOUS
Status: DISCONTINUED | OUTPATIENT
Start: 2020-10-10 | End: 2020-10-10 | Stop reason: HOSPADM

## 2020-10-10 RX ORDER — FENTANYL CITRATE 50 UG/ML
50 INJECTION, SOLUTION INTRAMUSCULAR; INTRAVENOUS
Status: DISCONTINUED | OUTPATIENT
Start: 2020-10-10 | End: 2020-10-10 | Stop reason: HOSPADM

## 2020-10-10 RX ADMIN — HYDROMORPHONE HYDROCHLORIDE 1 MG: 1 INJECTION, SOLUTION INTRAMUSCULAR; INTRAVENOUS; SUBCUTANEOUS at 18:23

## 2020-10-10 RX ADMIN — FENTANYL CITRATE 50 MCG: 50 INJECTION, SOLUTION INTRAMUSCULAR; INTRAVENOUS at 16:26

## 2020-10-10 RX ADMIN — IOPAMIDOL 75 ML: 755 INJECTION, SOLUTION INTRAVENOUS at 16:45

## 2020-10-10 RX ADMIN — SODIUM CHLORIDE 1000 ML: 9 INJECTION, SOLUTION INTRAVENOUS at 16:41

## 2020-10-10 RX ADMIN — ONDANSETRON 4 MG: 2 INJECTION INTRAMUSCULAR; INTRAVENOUS at 16:26

## 2020-10-10 ASSESSMENT — PAIN DESCRIPTION - LOCATION
LOCATION: ABDOMEN
LOCATION: ABDOMEN

## 2020-10-10 ASSESSMENT — PAIN SCALES - GENERAL
PAINLEVEL_OUTOF10: 9

## 2020-10-10 ASSESSMENT — PAIN DESCRIPTION - PAIN TYPE
TYPE: ACUTE PAIN
TYPE: ACUTE PAIN

## 2020-10-10 ASSESSMENT — PAIN DESCRIPTION - DESCRIPTORS: DESCRIPTORS: SHARP

## 2020-10-10 ASSESSMENT — PAIN DESCRIPTION - FREQUENCY: FREQUENCY: CONTINUOUS

## 2020-10-10 ASSESSMENT — PAIN DESCRIPTION - ORIENTATION: ORIENTATION: RIGHT;LOWER

## 2020-10-10 NOTE — ED NOTES
Report given to [facility]. Pt care assumed. Pt c/o abdominal pain- Dr. Mustapha Lomeli aware.  Awaiting order     Evelin Mckenna RN  [id number]

## 2020-10-10 NOTE — ED PROVIDER NOTES
Emergency Physician Note    Chief Complaint  Abdominal Pain (Lower right abd pain since last night. Two occurrences of emesis. )       History of Present Illness  Navya Molina is a 53 y.o. male who presents to the ED for abdominal pain. Patient reports that he has had lower abdominal pain since yesterday. He is vomited a few times. The emesis is bilious but not black or bloody. He is still passing gas today and had a normal bowel movement. He has a history of bowel obstructions and adhesions and is scheduled for surgery for lysis of adhesions in a few days. He denies any fevers, chills or sweats. No chest pain or shortness of breath. No urinary symptoms. 10 systems reviewed, pertinent positives per HPI otherwise noted to be negative    I have reviewed the following from the nursing documentation:      Prior to Admission medications    Medication Sig Start Date End Date Taking? Authorizing Provider   FLUoxetine (PROZAC) 20 MG capsule Take 1 capsule by mouth daily 10/8/20   Ed Suazo MD   traZODone (DESYREL) 100 MG tablet Take 2 tablets by mouth nightly 10/8/20   Ed Suazo MD   oxyCODONE (ROXICODONE) 5 MG immediate release tablet Take 1 tablet by mouth every 4 hours as needed for Pain for up to 7 days. 10/5/20 10/12/20  Deysi Avalos MD   atorvastatin (LIPITOR) 40 MG tablet Take 1 tablet by mouth nightly At bedtime 10/5/20   Barry Andres MD   zolpidem (AMBIEN) 10 MG tablet Take 1 tablet by mouth nightly as needed for Sleep for up to 90 days.  9/14/20 12/13/20  Tiffanie R Kehrt, APRN - CNP   aspirin 81 MG tablet Take 1 tablet by mouth daily for 14 days Take twice a day for 14 days after knee surgery then resume daily dosing 8/12/20 8/26/20  LILLIE Dominguez CNP   dextrose 5 % SOLN 50 mL with ketamine 100 MG/ML SOLN 50 mg Infuse intravenously continuous Indications: 350 mg once every 3 months    Historical Provider, MD   dicyclomine (BENTYL) 10 MG capsule Take 1 capsule by mouth every 6 hours as needed (cramps) 5/15/20   Teresa Michael PA-C   acetaminophen (TYLENOL) 500 MG tablet Take 2 tablets by mouth 3 times daily (with meals)  Patient taking differently: Take 1,000 mg by mouth 3 times daily as needed  3/25/20   Chaya Estevez PA-C   pantoprazole (PROTONIX) 40 MG tablet Take 1 tablet by mouth 2 times daily 2/28/20   Morro Salmeron MD   sildenafil (REVATIO) 20 MG tablet Take 4 tablets by mouth as needed (30 min prior to intercourse) 6/12/19   Morro Salmeron MD   calcium-vitamin D (OSCAL 500/200 D-3) 500-200 MG-UNIT per tablet Take 1 tablet by mouth 2 times daily     Historical Provider, MD   Multiple Vitamin TABS Take 1 tablet by mouth daily     Historical Provider, MD       Allergies as of 10/10/2020 - Review Complete 10/10/2020   Allergen Reaction Noted    Nsaids Nausea Only and Other (See Comments) 07/11/2013    Morphine Hives and Itching 04/26/2016    Prochlorperazine Other (See Comments) 12/30/2015    Valtrex [valacyclovir hcl] Diarrhea 08/09/2019    Morphine and related Itching 11/29/2016    Tolmetin Nausea Only 07/11/2013       Past Medical History:   Diagnosis Date    Alcoholism ([address]     last drink 2015    Allergic migraine with status migrainosus     Allergic rhinitis, seasonal     Anemia     Arthritis     right knee    Vaughn's esophagus     Benign intracranial hypertension     Cancer (HCC)     renal     Carpal tunnel syndrome     Chronic pain     Depression     Depression     GERD (gastroesophageal reflux disease)     Headache(784.0)     History of blood transfusion     History of tobacco use     Quit 7/2014    Migraine     chronic for 1 year    Morbid obesity ([address]     Movement disorder     Onychomycosis     Sleep apnea, obstructive     Severe uses cpap stop bang 6    Unspecified cerebral artery occlusion with cerebral infarction 2014    slight weakness in left arm    Wears dentures     full set    Wears glasses         Surgical History:   Past Surgical History:   Procedure Laterality Date    ABDOMEN SURGERY      gastric bypass    ABDOMEN SURGERY  4-7-2016    repair of recurrent incisional hernia with mesh, removal of old mesh, bilateral component separation    ABDOMINAL EXPLORATION SURGERY  1/11/16    exp lap, lysis of adhesions, small bowel resection    CARPAL TUNNEL RELEASE      bilat    DILATATION, ESOPHAGUS      ENDOSCOPY, COLON, DIAGNOSTIC      EYE SURGERY      GASTRIC BYPASS SURGERY  Jan 2009    Has lost about 200 pounds.  HERNIA REPAIR  3-    ventral    JOINT REPLACEMENT      KIDNEY REMOVAL Left 08/01/2018    [facility]     KNEE ARTHROSCOPY Right 7/11/12    RIGHT KNEE ARTHROSCOPY WITH CHONDROPLASTY    KNEE SURGERY  July 2012    right, arthroscopy    KNEE SURGERY Right 2/18/2020    INCISION AND DRAINAGE RIGHT KNEE AND WOULD VAC PLACEMENT performed by Tameka Bonds MD at [facility]  2005    OTHER SURGICAL HISTORY Left 03/08/2016    CT biopsy ablasion left kidney    OTHER SURGICAL HISTORY  2016    small intestine 12 inch removed, 2 hernia surgeries, another surgery to drain infection from incision.      REVISION TOTAL KNEE ARTHROPLASTY Right 12/17/2019    RIGHT REVISION TIBIA TOTAL KNEE REPLACEMENT performed by Tameka Bonds MD at [facility] Right 1/22/2020    RIGHT KNEE IRRIGATION AND DEBRIDEMENT WITH POLY EXCHANGE [facility] [address] [facility]  10/15/13    RIGHT    TOTAL KNEE ARTHROPLASTY Right 8/12/2020    RIGHT ARTHROPLASY  RESECTION WITH INSERTION OF SPACER performed by Tameka Bonds MD at [address]  6-7-2016    UPPER GASTROINTESTINAL ENDOSCOPY  04/02/2018        Family History:    Family History   Problem Relation Age of Onset    Cancer Father         Lymphoma    Arthritis Mother     Dementia Other     Diabetes Other     Cancer Other     Diabetes Maternal Uncle     Heart Disease Maternal Uncle     Depression Other     Heart Disease Maternal Uncle        Social History     Socioeconomic History    Marital status:      Spouse name: John Galvan Number of children: 2    Years of education: Not on file    Highest education level: Not on file   Occupational History    Occupation: [employer], [employer] his son. Social Needs    Financial resource strain: Not on file    Food insecurity     Worry: Not on file     Inability: Not on file    Transportation needs     Medical: Not on file     Non-medical: Not on file   Tobacco Use    Smoking status: Former Smoker     Packs/day: 0.50     Years: 25.00     Pack years: 12.50     Types: Cigarettes     Last attempt to quit: 7/31/2017     Years since quitting: 3.1    Smokeless tobacco: Never Used   Substance and Sexual Activity    Alcohol use: No     Alcohol/week: 0.0 standard drinks     Comment: last drink 2015    Drug use: No    Sexual activity: Yes     Partners: Female     Comment: wife Michelle Urias   Lifestyle    Physical activity     Days per week: Not on file     Minutes per session: Not on file    Stress: Not on file   Relationships    Social connections     Talks on phone: Not on file     Gets together: Not on file     Attends Spiritism service: Not on file     Active member of club or organization: Not on file     Attends meetings of clubs or organizations: Not on file     Relationship status: Not on file    Intimate partner violence     Fear of current or ex partner: Not on file     Emotionally abused: Not on file     Physically abused: Not on file     Forced sexual activity: Not on file   Other Topics Concern    Not on file   Social History Narrative    Not on file       Nursing notes reviewed.     ED Triage Vitals [10/10/20 1405]   Enc Vitals Group      /72      Pulse 65      Resp 18      Temp 98.6 °F (37 °C)      Temp Source Oral      SpO2 99 %      Weight 228 lb 6.3 oz (103.6 kg)      Height 6' (1.829 m)      Head Circumference       Peak Flow       Pain Score       Pain Loc       Pain Edu? Excl. in [address]? GENERAL:  Awake, alert. Well developed, well nourished with no apparent distress. HENT:  Normocephalic, Atraumatic, moist mucous membranes. EYES:  Pupils equal round and reactive to light, Conjunctiva normal, extraocular movements normal.  NECK:  No meningeal signs, Supple. CHEST:  Regular rate and rhythm, chest wall non-tender. LUNGS:  Clear to auscultation bilaterally. ABDOMEN:  Soft, lower abdominal tenderness, no rebound, rigidity or guarding, non-distended, normal bowel sounds. No costovertebral angle tenderness to palpation. BACK:  No tenderness. EXTREMITIES:  Normal range of motion, no edema, no bony tenderness, no deformity, distal pulses present. SKIN: Warm, dry and intact. NEUROLOGIC: Normal mental status. Moving all extremities to command. RADIOLOGY  X-RAYS:  I have reviewed radiologic plain film image(s). ALL OTHER NON-PLAIN FILM IMAGES SUCH AS CT, ULTRASOUND AND MRI HAVE BEEN READ BY THE RADIOLOGIST. CT ABDOMEN PELVIS W IV CONTRAST Additional Contrast? None   Final Result   *No evidence of bowel obstruction or inflammation. Postsurgical changes of   gastric bypass. Focal fluid-filled dilatation with associated air-fluid   levels of few bowel loops in the left upper quadrant in region of anastomosis. *Multiple stable chronic findings detailed above including cholelithiasis.               LABS  Labs Reviewed   CBC WITH AUTO DIFFERENTIAL - Abnormal; Notable for the following components:       Result Value    Hemoglobin 13.0 (*)     RDW 17.4 (*)     All other components within normal limits    Narrative:     Performed at:  [address] [address]   Phone [phone]   COMPREHENSIVE METABOLIC PANEL W/ REFLEX TO MG FOR LOW K - Abnormal; Notable for the following components:    Glucose 131 (*)     Alkaline Phosphatase 157 (*)     ALT 7 (*)     All other components within normal limits    Narrative:     Performed at:  [facility]  [address] [place], [place] [address]   Phone [phone]   URINE RT REFLEX TO CULTURE - Abnormal; Notable for the following components:    Clarity, UA CLOUDY (*)     All other components within normal limits    Narrative:     Performed at:  [facility]  [address] [place], [place] [address]   Phone [phone]   LIPASE    Narrative:     Performed at:  [facility]  [address] [place] [address]   Phone [phone]   MICROSCOPIC URINALYSIS    Narrative:     Performed at:  [facility]  [address] [place] [address]   Phone [phone]     [place] time is 40 minutes which excludes separately billable procedures. The critical care was concerning IV fluid bolus for dehydration. This time is exclusive of any time documented by any other providers. Patient reports he is feeling better after treatment received in the ER. I advised him to keep his appointment for surgery on Tuesday and I am providing him with a few pain pills to get him to that procedure. I advised the patient to return to the emergency department immediately for any new or worsening symptoms, such as fever, hematemesis, chest pain. The patient voiced agreement and understanding of the treatment plan. I estimate there is LOW risk for ACUTE APPENDICITIS, BOWEL OBSTRUCTION, CHOLECYSTITIS, DIVERTICULITIS, INCARCERATED HERNIA, PANCREATITIS, or PERFORATED BOWEL or ULCER, thus I consider the discharge disposition reasonable. Also, there is no evidence or peritonitis, sepsis, or toxicity.  Shilo Factor Estefany Sanchez and I have discussed the diagnosis and risks, and we agree with discharging home to follow-up with their primary doctor. We also discussed returning to the Emergency Department immediately if new or worsening symptoms occur. We have discussed the symptoms which are most concerning (e.g., bloody stool, fever, changing or worsening pain, vomiting) that necessitate immediate return. FINAL Impression    1. Abdominal pain, unspecified abdominal location    2. Nausea and vomiting, intractability of vomiting not specified, unspecified vomiting type        Blood pressure 115/64, pulse 65, temperature 98.6 °F (37 °C), temperature source Oral, resp. rate 18, height 6' (1.829 m), weight 228 lb 6.3 oz (103.6 kg), SpO2 99 %. Patient was given scripts for the following medications. I counseled patient how to take these medications. Discharge Medication List as of 10/10/2020  6:58 PM      START taking these medications    Details   dicyclomine (BENTYL) 10 MG capsule Take 1 capsule by mouth 4 times daily (before meals and nightly) for 7 days, Disp-28 capsule,R-0Print      ondansetron (ZOFRAN ODT) 4 MG disintegrating tablet Take 1 tablet by mouth 4 times daily as needed for Nausea or Vomiting, Disp-15 tablet,R-0Print             Disposition  Pt is in good condition upon Discharge to home. This chart was generated using the Be[employer] dictation system. I created this record but it may contain dictation errors.           Emelia Reed MD  10/10/20 8637

## 2020-10-11 ENCOUNTER — HOSPITAL ENCOUNTER (EMERGENCY)
Age: 50
Discharge: HOME | End: 2020-10-12
Payer: COMMERCIAL

## 2020-10-11 ENCOUNTER — APPOINTMENT (OUTPATIENT)
Dept: CT IMAGING | Age: 50
End: 2020-10-11
Payer: COMMERCIAL

## 2020-10-11 ENCOUNTER — APPOINTMENT (OUTPATIENT)
Dept: GENERAL RADIOLOGY | Age: 50
End: 2020-10-11
Payer: COMMERCIAL

## 2020-10-11 VITALS
DIASTOLIC BLOOD PRESSURE: 67 MMHG | BODY MASS INDEX: 30.95 KG/M2 | SYSTOLIC BLOOD PRESSURE: 119 MMHG | WEIGHT: 228.18 LBS | TEMPERATURE: 98.3 F | HEART RATE: 52 BPM | OXYGEN SATURATION: 98 % | RESPIRATION RATE: 24 BRPM

## 2020-10-11 LAB
A/G RATIO: 1.6 (ref 1.1–2.2)
ALBUMIN SERPL-MCNC: 3.8 G/DL (ref 3.4–5)
ALP BLD-CCNC: 138 U/L (ref 40–129)
ALT SERPL-CCNC: 10 U/L (ref 10–40)
ANION GAP SERPL CALCULATED.3IONS-SCNC: 11 MMOL/L (ref 3–16)
AST SERPL-CCNC: 10 U/L (ref 15–37)
BASOPHILS ABSOLUTE: 0.1 K/UL (ref 0–0.2)
BASOPHILS RELATIVE PERCENT: 1.6 %
BILIRUB SERPL-MCNC: 0.3 MG/DL (ref 0–1)
BILIRUBIN URINE: NEGATIVE
BLOOD, URINE: NEGATIVE
BUN BLDV-MCNC: 4 MG/DL (ref 7–20)
CALCIUM SERPL-MCNC: 9.2 MG/DL (ref 8.3–10.6)
CHLORIDE BLD-SCNC: 104 MMOL/L (ref 99–110)
CLARITY: CLEAR
CO2: 25 MMOL/L (ref 21–32)
COLOR: YELLOW
CREAT SERPL-MCNC: 0.9 MG/DL (ref 0.9–1.3)
EOSINOPHILS ABSOLUTE: 0.2 K/UL (ref 0–0.6)
EOSINOPHILS RELATIVE PERCENT: 3.7 %
GFR AFRICAN AMERICAN: >60
GFR NON-AFRICAN AMERICAN: >60
GLOBULIN: 2.4 G/DL
GLUCOSE BLD-MCNC: 108 MG/DL (ref 70–99)
GLUCOSE URINE: NEGATIVE MG/DL
HCT VFR BLD CALC: 36.9 % (ref 40.5–52.5)
HEMOGLOBIN: 11.7 G/DL (ref 13.5–17.5)
KETONES, URINE: NEGATIVE MG/DL
LEUKOCYTE ESTERASE, URINE: NEGATIVE
LIPASE: 26 U/L (ref 13–60)
LYMPHOCYTES ABSOLUTE: 2.6 K/UL (ref 1–5.1)
LYMPHOCYTES RELATIVE PERCENT: 39.8 %
MCH RBC QN AUTO: 26.7 PG (ref 26–34)
MCHC RBC AUTO-ENTMCNC: 31.7 G/DL (ref 31–36)
MCV RBC AUTO: 84.3 FL (ref 80–100)
MICROSCOPIC EXAMINATION: NORMAL
MONOCYTES ABSOLUTE: 0.4 K/UL (ref 0–1.3)
MONOCYTES RELATIVE PERCENT: 6.1 %
NEUTROPHILS ABSOLUTE: 3.2 K/UL (ref 1.7–7.7)
NEUTROPHILS RELATIVE PERCENT: 48.8 %
NITRITE, URINE: NEGATIVE
PDW BLD-RTO: 17.3 % (ref 12.4–15.4)
PH UA: 5.5 (ref 5–8)
PLATELET # BLD: 397 K/UL (ref 135–450)
PMV BLD AUTO: 8.1 FL (ref 5–10.5)
POTASSIUM SERPL-SCNC: 3.9 MMOL/L (ref 3.5–5.1)
PROTEIN UA: NEGATIVE MG/DL
RBC # BLD: 4.37 M/UL (ref 4.2–5.9)
SODIUM BLD-SCNC: 140 MMOL/L (ref 136–145)
SPECIFIC GRAVITY UA: 1.01 (ref 1–1.03)
TOTAL PROTEIN: 6.2 G/DL (ref 6.4–8.2)
URINE REFLEX TO CULTURE: NORMAL
URINE TYPE: NORMAL
UROBILINOGEN, URINE: 0.2 E.U./DL
WBC # BLD: 6.5 K/UL (ref 4–11)

## 2020-10-11 PROCEDURE — 74177 CT ABD & PELVIS W/CONTRAST: CPT

## 2020-10-11 PROCEDURE — 83690 ASSAY OF LIPASE: CPT

## 2020-10-11 PROCEDURE — 96375 TX/PRO/DX INJ NEW DRUG ADDON: CPT

## 2020-10-11 PROCEDURE — 81003 URINALYSIS AUTO W/O SCOPE: CPT

## 2020-10-11 PROCEDURE — 2580000003 HC RX 258: Performed by: PHYSICIAN ASSISTANT

## 2020-10-11 PROCEDURE — 6370000000 HC RX 637 (ALT 250 FOR IP): Performed by: PHYSICIAN ASSISTANT

## 2020-10-11 PROCEDURE — 6360000004 HC RX CONTRAST MEDICATION: Performed by: PHYSICIAN ASSISTANT

## 2020-10-11 PROCEDURE — 85025 COMPLETE CBC W/AUTO DIFF WBC: CPT

## 2020-10-11 PROCEDURE — 74022 RADEX COMPL AQT ABD SERIES: CPT

## 2020-10-11 PROCEDURE — 80053 COMPREHEN METABOLIC PANEL: CPT

## 2020-10-11 PROCEDURE — 96374 THER/PROPH/DIAG INJ IV PUSH: CPT

## 2020-10-11 PROCEDURE — 99284 EMERGENCY DEPT VISIT MOD MDM: CPT

## 2020-10-11 PROCEDURE — 6360000002 HC RX W HCPCS: Performed by: PHYSICIAN ASSISTANT

## 2020-10-11 RX ORDER — PROMETHAZINE HYDROCHLORIDE 25 MG/1
25 TABLET ORAL ONCE
Status: COMPLETED | OUTPATIENT
Start: 2020-10-11 | End: 2020-10-11

## 2020-10-11 RX ORDER — ONDANSETRON 2 MG/ML
4 INJECTION INTRAMUSCULAR; INTRAVENOUS ONCE
Status: COMPLETED | OUTPATIENT
Start: 2020-10-11 | End: 2020-10-11

## 2020-10-11 RX ORDER — 0.9 % SODIUM CHLORIDE 0.9 %
1000 INTRAVENOUS SOLUTION INTRAVENOUS ONCE
Status: COMPLETED | OUTPATIENT
Start: 2020-10-11 | End: 2020-10-11

## 2020-10-11 RX ORDER — OXYCODONE HYDROCHLORIDE 5 MG/1
5 TABLET ORAL ONCE
Status: COMPLETED | OUTPATIENT
Start: 2020-10-11 | End: 2020-10-11

## 2020-10-11 RX ADMIN — IOHEXOL 50 ML: 240 INJECTION, SOLUTION INTRATHECAL; INTRAVASCULAR; INTRAVENOUS; ORAL at 23:21

## 2020-10-11 RX ADMIN — SODIUM CHLORIDE 1000 ML: 9 INJECTION, SOLUTION INTRAVENOUS at 22:09

## 2020-10-11 RX ADMIN — ONDANSETRON 4 MG: 2 INJECTION INTRAMUSCULAR; INTRAVENOUS at 22:12

## 2020-10-11 RX ADMIN — HYDROMORPHONE HYDROCHLORIDE 1 MG: 1 INJECTION, SOLUTION INTRAMUSCULAR; INTRAVENOUS; SUBCUTANEOUS at 22:11

## 2020-10-11 RX ADMIN — OXYCODONE HYDROCHLORIDE 5 MG: 5 TABLET ORAL at 21:01

## 2020-10-11 RX ADMIN — IOPAMIDOL 75 ML: 755 INJECTION, SOLUTION INTRAVENOUS at 23:21

## 2020-10-11 RX ADMIN — PROMETHAZINE HYDROCHLORIDE 25 MG: 25 TABLET ORAL at 21:02

## 2020-10-11 ASSESSMENT — PAIN DESCRIPTION - ORIENTATION: ORIENTATION: LOWER

## 2020-10-11 ASSESSMENT — PAIN DESCRIPTION - LOCATION
LOCATION: ABDOMEN
LOCATION: ABDOMEN

## 2020-10-11 ASSESSMENT — PAIN SCALES - GENERAL
PAINLEVEL_OUTOF10: 9
PAINLEVEL_OUTOF10: 8

## 2020-10-11 ASSESSMENT — PAIN DESCRIPTION - PAIN TYPE
TYPE: ACUTE PAIN
TYPE: ACUTE PAIN

## 2020-10-12 ENCOUNTER — CARE COORDINATION (OUTPATIENT)
Dept: CARE COORDINATION | Age: 50
End: 2020-10-12

## 2020-10-12 ASSESSMENT — PAIN DESCRIPTION - ONSET: ONSET: ON-GOING

## 2020-10-12 ASSESSMENT — PAIN DESCRIPTION - PROGRESSION: CLINICAL_PROGRESSION: NOT CHANGED

## 2020-10-12 ASSESSMENT — ENCOUNTER SYMPTOMS
DIARRHEA: 0
ABDOMINAL PAIN: 1
CONSTIPATION: 0
VOMITING: 1
NAUSEA: 1
BACK PAIN: 0

## 2020-10-12 ASSESSMENT — PAIN DESCRIPTION - ORIENTATION: ORIENTATION: MID

## 2020-10-12 ASSESSMENT — PAIN SCALES - GENERAL: PAINLEVEL_OUTOF10: 8

## 2020-10-12 ASSESSMENT — PAIN - FUNCTIONAL ASSESSMENT
PAIN_FUNCTIONAL_ASSESSMENT: ACTIVITIES ARE NOT PREVENTED
PAIN_FUNCTIONAL_ASSESSMENT: 0-10

## 2020-10-12 ASSESSMENT — PAIN DESCRIPTION - FREQUENCY: FREQUENCY: CONTINUOUS

## 2020-10-12 ASSESSMENT — PAIN DESCRIPTION - PAIN TYPE: TYPE: ACUTE PAIN

## 2020-10-12 ASSESSMENT — PAIN DESCRIPTION - LOCATION: LOCATION: ABDOMEN

## 2020-10-12 ASSESSMENT — PAIN DESCRIPTION - DESCRIPTORS: DESCRIPTORS: DISCOMFORT

## 2020-10-12 NOTE — CARE COORDINATION
Patient was contacted for ED follow up and to ask if he would be interested in enrollment in [address] [place] [address], after explaining to him why and what that would involve, he stated he is not interested at this time. Patient contacted regarding recent discharge and COVID-19 risk. Discussed COVID-19 related testing which was available at this time. Test results were negative. Patient informed of results, if available? Yes     Care Transition Nurse/ Ambulatory Care Manager contacted the patient by telephone to perform post discharge assessment. Verified name and  with patient as identifiers. Patient has following risk factors of: no known risk factors. CTN/ACM reviewed discharge instructions, medical action plan and red flags related to discharge diagnosis. Reviewed and educated them on any new and changed medications related to discharge diagnosis. Advised obtaining a 90-day supply of all daily and as-needed medications. Education provided regarding infection prevention, and signs and symptoms of COVID-19 and when to seek medical attention with patient who verbalized understanding. Discussed exposure protocols and quarantine from [address] you at higher risk for severe illness  and given an opportunity for questions and concerns. The patient agrees to contact the COVID-19 hotline [phone] or PCP office for questions related to their healthcare. CTN/ACM provided contact information for future reference. From CDC: Are you at higher risk for severe illness?  Wash your hands often.  Avoid close contact (6 feet, which is about two arm lengths) with people who are sick.  Put distance between yourself and other people if COVID-19 is spreading in your community.  Clean and disinfect frequently touched surfaces.  Avoid all cruise travel and non-essential air travel.    Call your healthcare professional if you have concerns about COVID-19 and your underlying condition or if you are sick. For more information on steps you can take to protect yourself, see CDC's How to [place] for follow-up call in 7-14 days based on severity of symptoms and risk factors.

## 2020-10-12 NOTE — ED PROVIDER NOTES
[address] [facility]      Pt Name: Ramakrishna Rogers  MRN: [id number]  [place] 1970  Date of evaluation: 10/11/2020  Provider: JEMAL Ortiz    This patient was not seen and evaluated by the attending physician No att. providers found. CHIEF COMPLAINT       Chief Complaint   Patient presents with    Abdominal Pain     abd pain off and on since Friday. pt seen yesterday in ED. CRITICAL CARE TIME   I performed a total Critical Care time of  15 minutes, excluding separately reportable procedures. There was a high probability of clinically significant/life threatening deterioration in the patient's condition which required my urgent intervention. Not limited to multiple reexaminations, discussions with attending physician and consultants. HISTORY OF PRESENT ILLNESS  (Location/Symptom, Timing/Onset, Context/Setting, Quality, Duration, Modifying Factors, Severity.)   Ramakrishna Rogers is a 53 y.o. male who presents to the emergency department complaining of return of abdominal pain. Has been having the pain intermittently for years flared up again today. Was seen in the emergency department yesterday and states that Dilaudid and fentanyl and helped with the pain temporarily. He has an exploratory laparoscopy scheduled in 2 days at [address] [place] [address]. He states he had some nausea and vomiting was try to stick to a clear liquid diet states that the vomiting has improved and he was able to tolerate liquids this evening. Did not take anything at home for the pain including not taking the oxycodone that he takes for knee pain. No fevers. No urinary symptoms. Pain is 9 out of 10. Nursing Notes were reviewed and I agree. REVIEW OF SYSTEMS    (2-9 systems for level 4, 10 or more for level 5)     Review of Systems   Constitutional: Negative for fever. Gastrointestinal: Positive for abdominal pain, nausea and vomiting. Negative for constipation and diarrhea. Genitourinary: Negative for dysuria. Musculoskeletal: Negative for back pain. Neurological: Negative for weakness and numbness. Psychiatric/Behavioral: Negative for agitation, behavioral problems and confusion. Except as noted above the remainder of the review of systems was reviewed and negative. PAST MEDICAL HISTORY         Diagnosis Date    Alcoholism [facility])     last drink 2015    Allergic migraine with status migrainosus     Allergic rhinitis, seasonal     Anemia     Arthritis     right knee    Vaughn's esophagus     Benign intracranial hypertension     Cancer (HCC)     renal     Carpal tunnel syndrome     Chronic pain     Depression     Depression     GERD (gastroesophageal reflux disease)     Headache(784.0)     History of blood transfusion     History of tobacco use     Quit 7/2014    Migraine     chronic for 1 year    Morbid obesity ([address]     Movement disorder     Onychomycosis     Sleep apnea, obstructive     Severe uses cpap stop bang 6    Unspecified cerebral artery occlusion with cerebral infarction 2014    slight weakness in left arm    Wears dentures     full set    Wears glasses        SURGICAL HISTORY           Procedure Laterality Date    ABDOMEN SURGERY      gastric bypass    ABDOMEN SURGERY  4-7-2016    repair of recurrent incisional hernia with mesh, removal of old mesh, bilateral component separation    ABDOMINAL EXPLORATION SURGERY  1/11/16    exp lap, lysis of adhesions, small bowel resection    CARPAL TUNNEL RELEASE      bilat    DILATATION, ESOPHAGUS      ENDOSCOPY, COLON, DIAGNOSTIC      EYE SURGERY      GASTRIC BYPASS SURGERY  Jan 2009    Has lost about 200 pounds.     HERNIA REPAIR  3-    ventral    JOINT REPLACEMENT      KIDNEY REMOVAL Left 08/01/2018    KNEE ARTHROSCOPY Right 7/11/2013    Dr.Robert Sanders     KNEE ARTHROSCOPY Right 7/11/12    RIGHT KNEE ARTHROSCOPY WITH CHONDROPLASTY    KNEE SURGERY  July 2012    right, arthroscopy    KNEE SURGERY Right 2/18/2020    INCISION AND DRAINAGE RIGHT KNEE AND WOULD VAC PLACEMENT performed by Doretha [place] MD at [address]  2005    OTHER SURGICAL HISTORY Left 03/08/2016    CT biopsy ablasion left kidney    OTHER SURGICAL HISTORY  2016    small intestine 12 inch removed, 2 hernia surgeries, another surgery to drain infection from incision.  REVISION TOTAL KNEE ARTHROPLASTY Right 12/17/2019    RIGHT REVISION TIBIA TOTAL KNEE REPLACEMENT performed by Doretha [place] MD at [address] [facility] Right 1/22/2020    RIGHT KNEE IRRIGATION AND DEBRIDEMENT WITH POLY EXCHANGE [facility] [address] [facility]  10/15[facility]    RIGHT    TOTAL KNEE ARTHROPLASTY Right 8/12/2020    RIGHT ARTHROPLASY  RESECTION WITH INSERTION OF SPACER performed by Doretha [place] MD at [address] [facility]  6-7-2016    UPPER GASTROINTESTINAL ENDOSCOPY  04/02/2018       CURRENT MEDICATIONS       Discharge Medication List as of 10/12/2020 12:39 AM      CONTINUE these medications which have NOT CHANGED    Details   dicyclomine (BENTYL) 10 MG capsule Take 1 capsule by mouth 4 times daily (before meals and nightly) for 7 days, Disp-28 capsule,R-0Print      ondansetron (ZOFRAN ODT) 4 MG disintegrating tablet Take 1 tablet by mouth 4 times daily as needed for Nausea or Vomiting, Disp-15 tablet,R-0Print      FLUoxetine (PROZAC) 20 MG capsule Take 1 capsule by mouth daily, Disp-90 capsule,R-1Normal      traZODone (DESYREL) 100 MG tablet Take 2 tablets by mouth nightly, Disp-180 tablet,R-0Normal      oxyCODONE (ROXICODONE) 5 MG immediate release tablet Take 1 tablet by mouth every 4 hours as needed for Pain for up to 7 days. , Disp-42 tablet,R-0Normal      atorvastatin (LIPITOR) 40 MG tablet Take 1 tablet by mouth nightly At bedtime, Disp-90 tablet,R-0Normal      zolpidem (AMBIEN) 10 MG tablet Take 1 tablet by mouth nightly as needed for Sleep for up to 90 days. , Disp-90 tablet,R-1Normal      aspirin 81 MG tablet Take 1 tablet by mouth daily for 14 days Take twice a day for 14 days after knee surgery then resume daily dosing, Disp-28 tablet,R-0Print      dextrose 5 % SOLN 50 mL with ketamine 100 MG/ML SOLN 50 mg Infuse intravenously continuous Indications: 350 mg once every 3 monthsHistorical Med      acetaminophen (TYLENOL) 500 MG tablet Take 2 tablets by mouth 3 times daily (with meals), Disp-42 tablet, R-0Print      pantoprazole (PROTONIX) 40 MG tablet Take 1 tablet by mouth 2 times dailyHistorical Med      sildenafil (REVATIO) 20 MG tablet Take 4 tablets by mouth as needed (30 min prior to intercourse), Disp-30 tablet, R-5Normal      calcium-vitamin D (OSCAL 500/200 D-3) 500-200 MG-UNIT per tablet Take 1 tablet by mouth 2 times daily Historical Med      Multiple Vitamin TABS Take 1 tablet by mouth daily              ALLERGIES     Nsaids; Morphine; Prochlorperazine; Valtrex [valacyclovir hcl]; Morphine and related; and Tolmetin    FAMILY HISTORY           Problem Relation Age of Onset    Cancer Father         Lymphoma    Arthritis Mother     Dementia Other     Diabetes Other     Cancer Other     Diabetes Maternal Uncle     Heart Disease Maternal Uncle     Depression Other     Heart Disease Maternal Uncle      Family Status   Relation Name Status    Father   at age 61        non-hodgkin's lymphoma    Mother  Alive    Other  (Not Specified)    [facility]      Other [address] [place] [address] (Not Specified)    MGM      MGF      [facility]      PGF      [facility]          SOCIAL HISTORY      reports that he quit smoking about 3 years ago. His smoking use included cigarettes. He has a 12.50 pack-year smoking history.  He has never used smokeless tobacco. He reports that he does not drink alcohol or use drugs.    PHYSICAL EXAM    (up to 7 for level 4, 8 or more for level 5)     ED Triage Vitals   BP Temp Temp Source Pulse Resp SpO2 Height Weight   10/11/20 1825 10/11/20 1825 10/11/20 1825 10/11/20 1825 10/11/20 1825 10/11/20 1825 -- 10/11/20 1824   120/84 98.1 °F (36.7 °C) Temporal 81 18 96 %  228 lb 2.8 oz (103.5 kg)       Physical Exam  Vitals signs and nursing note reviewed. Constitutional:       Appearance: He is well-developed. HENT:      Head: Normocephalic and atraumatic. Cardiovascular:      Rate and Rhythm: Normal rate. Pulmonary:      Effort: Pulmonary effort is normal. No respiratory distress. Abdominal:      Tenderness: There is generalized abdominal tenderness. There is no guarding or rebound. Skin:     General: Skin is warm. Neurological:      General: No focal deficit present. Mental Status: He is alert and oriented to person, place, and time. Psychiatric:         Mood and Affect: Mood normal.         Behavior: Behavior normal.         DIAGNOSTIC RESULTS     RADIOLOGY:   Non-plain film images such as CT, Ultrasound and MRI are read by the radiologist. Plain radiographic images are visualized and preliminarily interpreted by JEMAL Ramirez with the below findings:    Reviewed radiologist's interpretation. Interpretation per the Radiologist below, if available at the time of this note:    CT ABDOMEN PELVIS W IV CONTRAST Additional Contrast? Oral   Final Result   Cholelithiasis. Distal colonic diverticulosis. No acute diverticulitis. No evidence of bowel obstruction. Normal appendix. Status post gastric bypass surgery, splenectomy, left nephrectomy. XR ACUTE ABD SERIES CHEST 1 VW   Final Result   1. Persistent or recurrent small bowel dilation on the left could represent   an ileus or a partial small bowel obstruction. 2. No acute cardiopulmonary disease.                LABS:  Labs Reviewed   CBC WITH AUTO DIFFERENTIAL - Abnormal; Notable for the following components:       Result Value    Hemoglobin 11.7 (*)     Hematocrit 36.9 (*)     RDW 17.3 (*)     All other components within normal limits    Narrative:     Performed at:  [facility]  [address] [address]   Phone [phone]   COMPREHENSIVE METABOLIC PANEL - Abnormal; Notable for the following components:    Glucose 108 (*)     BUN 4 (*)     Total Protein 6.2 (*)     Alkaline Phosphatase 138 (*)     AST 10 (*)     All other components within normal limits    Narrative:     Performed at:  [facility]  [address] [facility] [place] [place] [address] [address]   Phone [phone]   LIPASE    Narrative:     Performed at:  [place] [address] [address] [facility] [address] [address]   Phone [phone]   URINE RT REFLEX TO CULTURE    Narrative:     Performed at:  [place] [address] [address] [facility] [address]   Phone [phone]       All other labs were within normal range or not returned as of this dictation. EMERGENCY DEPARTMENT COURSE and DIFFERENTIAL DIAGNOSIS/MDM:   Vitals:    Vitals:    10/11/20 1825 10/11/20 2000 10/11/20 2146 10/11/20 2358   BP: 120/84 114/67 109/69 119/67   Pulse: 81 65 57 52   Resp: 18 17 16 24   Temp: 98.1 °F (36.7 °C) 97.9 °F (36.6 °C) 98.3 °F (36.8 °C)    TempSrc: Temporal Oral Oral    SpO2: 96% 99% 96% 98%   Weight:         I discussed with Nikki Elizabeth and/or family the exam results, diagnosis, care, prognosis, reasons to return and the importance of follow up. Patient and/or family is in full agreement with plan and all questions have been answered. Specific discharge instructions explained, including reasons to return to the emergency department. Nikki Elizabeth is well appearing, non-toxic, and afebrile at the time of discharge. Patient is afebrile not tachycardic.   His belly is actually very soft and benign is complaining of right-sided abdominal pain and vomiting. He tolerated p.o. medications here but x-ray was concerning for partial small bowel obstruction so a CT scan with oral contrast was performed that shows no evidence of bowel obstruction. He tolerated p.o. medications here and had no more vomiting. He was given 1 dose of IV Dilaudid and he has oxycodone prescription that is active at home. Keep appointment for exploratory laparoscopy on Tuesday and return for new, worsening or other concerns. Lab work is also reassuring. I estimate there is LOW risk for ACUTE APPENDICITIS, BOWEL OBSTRUCTION, CHOLECYSTITIS, DIVERTICULITIS, INCARCERATED HERNIA, PANCREATITIS, PERFORATED BOWEL, BOWEL ISCHEMIA, GONADAL TORSION, OR CARDIAC ISCHEMIA, thus I consider the discharge disposition reasonable. Also, there is no evidence or peritonitis, sepsis, or toxicity. CONSULTS:  None    PROCEDURES:  Procedures      FINAL IMPRESSION      1. Chronic abdominal pain    2.  Gallstones          DISPOSITION/PLAN   DISPOSITION Decision To Discharge 10/12/2020 12:07:31 AM      PATIENT REFERRED TO:  Tigist Gonzales MD  [address] [place] [address]  [phone]    Call   For follow up      DISCHARGE MEDICATIONS:  Discharge Medication List as of 10/12/2020 12:39 AM          (Please note that portions of this note were completed with a voice recognition program.  Efforts were made to edit the dictations but occasionally words are mis-transcribed.)    Todd Meneses [place]  10/12/20 0627

## 2020-10-13 LAB — B-TYPE NATRIURETIC PEPTIDE: 122 PG/ML

## 2020-10-15 ENCOUNTER — TELEPHONE (OUTPATIENT)
Dept: FAMILY MEDICINE CLINIC | Age: 50
End: 2020-10-15

## 2020-10-15 ENCOUNTER — OFFICE VISIT (OUTPATIENT)
Dept: ORTHOPEDIC SURGERY | Age: 50
End: 2020-10-15

## 2020-10-15 VITALS — TEMPERATURE: 98 F | RESPIRATION RATE: 16 BRPM

## 2020-10-15 PROCEDURE — 99024 POSTOP FOLLOW-UP VISIT: CPT | Performed by: ORTHOPAEDIC SURGERY

## 2020-10-15 NOTE — PROGRESS NOTES
[facility]  Office Visit    Chief Complaint: Postop follow-up right total knee arthroplasty infection with removal of prosthesis and placement of spacer    HPI:  Jaret Estevez is a 53 y.o. who is here for follow-up of his right knee. Removal of his knee prosthesis with placement of an articulating spacer 2 months ago. He finishes IV antibiotics and had a PICC line pulled 2 weeks ago. He is here in the office today alone and is ambulating with use of a cane. He reports that his pain is controlled. His knee continues to improve. He is not having any wound issues. He denies fevers or chills or knee swelling. He would like to continue with a spacer at least into next year. 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  Chondromalacia of patellofemoral joint    Chronic pain syndrome    History of stroke    Gastroesophageal reflux disease with esophagitis--on daily ppi    Intractable chronic migraine without aura and with status migrainosus    Iron deficiency anemia    B12 deficiency    Anastomotic ulcer    Malignant neoplasm of left kidney (HCC) clear cell. 8/1/18 Dr India Kessler.     Total knee replacement status, right    Failed total knee, right, initial encounter [facility]    Infection of total knee replacement ([address]    History of depression    History of alcoholism ([address]    Receiving intravenous antibiotic treatment as outpatient    SBO (small bowel obstruction) ([address]    Partial small bowel obstruction ([address]    Infection of prosthetic right knee joint ([address]       ROS:  Constitutional: denies fever, chills, weight loss  MSK: denies pain in other joints, muscle aches  Neurological: denies numbness, tingling, weakness    Exam:  Temperature 98 °F (36.7 °C), temperature source Infrared, resp. rate 16. Appearance: sitting in exam room chair, appears to be in no acute distress, awake and alert  Resp: unlabored breathing on room air  Skin: warm, dry and intact with out erythema or significant increased temperature  Neuro: grossly intact both lower extremities. Intact sensation to light touch. Motor exam 4+ to 5/5 in all major motor groups. MSK: Right leg -anterior midline incision of the knee is healed. Active knee range of motion is 5 to 105 degrees. There is a small joint effusion. He is nontender about the knee. 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 He continues to improve with his articulating spacer. His wound has healed. He is currently tolerating like to continue with it released into next year. We discussed signs of recurrent infection including increased swelling and pain or erythema. He will come back in 3 months for reevaluation or sooner if needed. This dictation was done with [employer] dictation and may contain mechanical errors related to translation.

## 2020-10-22 ENCOUNTER — APPOINTMENT (OUTPATIENT)
Dept: CT IMAGING | Age: 50
DRG: 390 | End: 2020-10-22
Payer: COMMERCIAL

## 2020-10-22 ENCOUNTER — HOSPITAL ENCOUNTER (INPATIENT)
Age: 50
LOS: 4 days | Discharge: ANOTHER HOSPITAL | DRG: 390 | End: 2020-10-26
Attending: INTERNAL MEDICINE | Admitting: INTERNAL MEDICINE
Payer: COMMERCIAL

## 2020-10-22 LAB
A/G RATIO: 1.3 (ref 1.1–2.2)
ALBUMIN SERPL-MCNC: 3.6 G/DL (ref 3.4–5)
ALP BLD-CCNC: 145 U/L (ref 40–129)
ALT SERPL-CCNC: 8 U/L (ref 10–40)
ANION GAP SERPL CALCULATED.3IONS-SCNC: 9 MMOL/L (ref 3–16)
AST SERPL-CCNC: 13 U/L (ref 15–37)
BASOPHILS ABSOLUTE: 0.1 K/UL (ref 0–0.2)
BASOPHILS RELATIVE PERCENT: 0.8 %
BILIRUB SERPL-MCNC: 0.5 MG/DL (ref 0–1)
BILIRUBIN URINE: ABNORMAL
BLOOD, URINE: NEGATIVE
BUN BLDV-MCNC: 6 MG/DL (ref 7–20)
CALCIUM SERPL-MCNC: 9 MG/DL (ref 8.3–10.6)
CHLORIDE BLD-SCNC: 102 MMOL/L (ref 99–110)
CLARITY: ABNORMAL
CO2: 25 MMOL/L (ref 21–32)
COARSE CASTS, UA: ABNORMAL /LPF (ref 0–2)
COLOR: ABNORMAL
COMMENT UA: ABNORMAL
CREAT SERPL-MCNC: 1 MG/DL (ref 0.9–1.3)
EOSINOPHILS ABSOLUTE: 0.2 K/UL (ref 0–0.6)
EOSINOPHILS RELATIVE PERCENT: 2 %
EPITHELIAL CELLS, UA: 4 /HPF (ref 0–5)
GFR AFRICAN AMERICAN: >60
GFR NON-AFRICAN AMERICAN: >60
GLOBULIN: 2.8 G/DL
GLUCOSE BLD-MCNC: 130 MG/DL (ref 70–99)
GLUCOSE URINE: NEGATIVE MG/DL
HCT VFR BLD CALC: 38.6 % (ref 40.5–52.5)
HEMOGLOBIN: 12.5 G/DL (ref 13.5–17.5)
KETONES, URINE: NEGATIVE MG/DL
LEUKOCYTE ESTERASE, URINE: NEGATIVE
LIPASE: 21 U/L (ref 13–60)
LYMPHOCYTES ABSOLUTE: 1.7 K/UL (ref 1–5.1)
LYMPHOCYTES RELATIVE PERCENT: 22.1 %
MCH RBC QN AUTO: 26.9 PG (ref 26–34)
MCHC RBC AUTO-ENTMCNC: 32.2 G/DL (ref 31–36)
MCV RBC AUTO: 83.5 FL (ref 80–100)
MICROSCOPIC EXAMINATION: YES
MONOCYTES ABSOLUTE: 0.4 K/UL (ref 0–1.3)
MONOCYTES RELATIVE PERCENT: 5.3 %
NEUTROPHILS ABSOLUTE: 5.4 K/UL (ref 1.7–7.7)
NEUTROPHILS RELATIVE PERCENT: 69.8 %
NITRITE, URINE: NEGATIVE
PDW BLD-RTO: 17.5 % (ref 12.4–15.4)
PH UA: 6.5 (ref 5–8)
PLATELET # BLD: 496 K/UL (ref 135–450)
PMV BLD AUTO: 8.2 FL (ref 5–10.5)
POTASSIUM REFLEX MAGNESIUM: 4 MMOL/L (ref 3.5–5.1)
PROTEIN UA: NEGATIVE MG/DL
RBC # BLD: 4.63 M/UL (ref 4.2–5.9)
RBC UA: 2 /HPF (ref 0–4)
SODIUM BLD-SCNC: 136 MMOL/L (ref 136–145)
SPECIFIC GRAVITY UA: 1.02 (ref 1–1.03)
TOTAL PROTEIN: 6.4 G/DL (ref 6.4–8.2)
URINE REFLEX TO CULTURE: ABNORMAL
URINE TYPE: ABNORMAL
UROBILINOGEN, URINE: 1 E.U./DL
WBC # BLD: 7.7 K/UL (ref 4–11)
WBC UA: 5 /HPF (ref 0–5)

## 2020-10-22 PROCEDURE — 80053 COMPREHEN METABOLIC PANEL: CPT

## 2020-10-22 PROCEDURE — 81001 URINALYSIS AUTO W/SCOPE: CPT

## 2020-10-22 PROCEDURE — 6360000002 HC RX W HCPCS: Performed by: PHYSICIAN ASSISTANT

## 2020-10-22 PROCEDURE — 6370000000 HC RX 637 (ALT 250 FOR IP): Performed by: INTERNAL MEDICINE

## 2020-10-22 PROCEDURE — 85025 COMPLETE CBC W/AUTO DIFF WBC: CPT

## 2020-10-22 PROCEDURE — 1200000000 HC SEMI PRIVATE

## 2020-10-22 PROCEDURE — 99285 EMERGENCY DEPT VISIT HI MDM: CPT

## 2020-10-22 PROCEDURE — 6360000004 HC RX CONTRAST MEDICATION: Performed by: PHYSICIAN ASSISTANT

## 2020-10-22 PROCEDURE — 2580000003 HC RX 258: Performed by: INTERNAL MEDICINE

## 2020-10-22 PROCEDURE — 83690 ASSAY OF LIPASE: CPT

## 2020-10-22 PROCEDURE — 6360000002 HC RX W HCPCS: Performed by: INTERNAL MEDICINE

## 2020-10-22 PROCEDURE — 2580000003 HC RX 258: Performed by: PHYSICIAN ASSISTANT

## 2020-10-22 PROCEDURE — 96374 THER/PROPH/DIAG INJ IV PUSH: CPT

## 2020-10-22 PROCEDURE — 74177 CT ABD & PELVIS W/CONTRAST: CPT

## 2020-10-22 PROCEDURE — 96375 TX/PRO/DX INJ NEW DRUG ADDON: CPT

## 2020-10-22 RX ORDER — OXYCODONE HYDROCHLORIDE AND ACETAMINOPHEN 5; 325 MG/1; MG/1
2 TABLET ORAL EVERY 4 HOURS PRN
Status: DISCONTINUED | OUTPATIENT
Start: 2020-10-22 | End: 2020-10-25

## 2020-10-22 RX ORDER — PROMETHAZINE HYDROCHLORIDE 25 MG/1
12.5 TABLET ORAL EVERY 6 HOURS PRN
Status: DISCONTINUED | OUTPATIENT
Start: 2020-10-22 | End: 2020-10-25

## 2020-10-22 RX ORDER — POLYETHYLENE GLYCOL 3350 17 G/17G
17 POWDER, FOR SOLUTION ORAL DAILY PRN
Status: DISCONTINUED | OUTPATIENT
Start: 2020-10-22 | End: 2020-10-23

## 2020-10-22 RX ORDER — ZOLPIDEM TARTRATE 5 MG/1
10 TABLET ORAL NIGHTLY PRN
Status: DISCONTINUED | OUTPATIENT
Start: 2020-10-22 | End: 2020-10-26 | Stop reason: HOSPADM

## 2020-10-22 RX ORDER — ACETAMINOPHEN 650 MG/1
650 SUPPOSITORY RECTAL EVERY 6 HOURS PRN
Status: DISCONTINUED | OUTPATIENT
Start: 2020-10-22 | End: 2020-10-26

## 2020-10-22 RX ORDER — SODIUM CHLORIDE 0.9 % (FLUSH) 0.9 %
10 SYRINGE (ML) INJECTION EVERY 12 HOURS SCHEDULED
Status: DISCONTINUED | OUTPATIENT
Start: 2020-10-22 | End: 2020-10-26 | Stop reason: HOSPADM

## 2020-10-22 RX ORDER — TRAZODONE HYDROCHLORIDE 100 MG/1
200 TABLET ORAL NIGHTLY
Status: DISCONTINUED | OUTPATIENT
Start: 2020-10-22 | End: 2020-10-26 | Stop reason: HOSPADM

## 2020-10-22 RX ORDER — MAGNESIUM SULFATE IN WATER 40 MG/ML
2 INJECTION, SOLUTION INTRAVENOUS PRN
Status: DISCONTINUED | OUTPATIENT
Start: 2020-10-22 | End: 2020-10-26 | Stop reason: HOSPADM

## 2020-10-22 RX ORDER — METOCLOPRAMIDE HYDROCHLORIDE 5 MG/ML
10 INJECTION INTRAMUSCULAR; INTRAVENOUS ONCE
Status: COMPLETED | OUTPATIENT
Start: 2020-10-22 | End: 2020-10-22

## 2020-10-22 RX ORDER — POTASSIUM CHLORIDE 7.45 MG/ML
10 INJECTION INTRAVENOUS PRN
Status: DISCONTINUED | OUTPATIENT
Start: 2020-10-22 | End: 2020-10-26 | Stop reason: HOSPADM

## 2020-10-22 RX ORDER — SODIUM CHLORIDE 9 MG/ML
INJECTION, SOLUTION INTRAVENOUS CONTINUOUS
Status: DISCONTINUED | OUTPATIENT
Start: 2020-10-22 | End: 2020-10-26 | Stop reason: HOSPADM

## 2020-10-22 RX ORDER — PANTOPRAZOLE SODIUM 40 MG/1
40 TABLET, DELAYED RELEASE ORAL
Status: DISCONTINUED | OUTPATIENT
Start: 2020-10-22 | End: 2020-10-26 | Stop reason: HOSPADM

## 2020-10-22 RX ORDER — ACETAMINOPHEN 325 MG/1
650 TABLET ORAL EVERY 6 HOURS PRN
Status: DISCONTINUED | OUTPATIENT
Start: 2020-10-22 | End: 2020-10-26

## 2020-10-22 RX ORDER — ATORVASTATIN CALCIUM 40 MG/1
40 TABLET, FILM COATED ORAL NIGHTLY
Status: DISCONTINUED | OUTPATIENT
Start: 2020-10-22 | End: 2020-10-26 | Stop reason: HOSPADM

## 2020-10-22 RX ORDER — ONDANSETRON 2 MG/ML
4 INJECTION INTRAMUSCULAR; INTRAVENOUS EVERY 6 HOURS PRN
Status: DISCONTINUED | OUTPATIENT
Start: 2020-10-22 | End: 2020-10-26 | Stop reason: HOSPADM

## 2020-10-22 RX ORDER — OXYCODONE HYDROCHLORIDE AND ACETAMINOPHEN 5; 325 MG/1; MG/1
1 TABLET ORAL EVERY 4 HOURS PRN
Status: DISCONTINUED | OUTPATIENT
Start: 2020-10-22 | End: 2020-10-25

## 2020-10-22 RX ORDER — 0.9 % SODIUM CHLORIDE 0.9 %
1000 INTRAVENOUS SOLUTION INTRAVENOUS ONCE
Status: COMPLETED | OUTPATIENT
Start: 2020-10-22 | End: 2020-10-22

## 2020-10-22 RX ORDER — SODIUM CHLORIDE 0.9 % (FLUSH) 0.9 %
10 SYRINGE (ML) INJECTION PRN
Status: DISCONTINUED | OUTPATIENT
Start: 2020-10-22 | End: 2020-10-26 | Stop reason: HOSPADM

## 2020-10-22 RX ORDER — DIPHENHYDRAMINE HYDROCHLORIDE 50 MG/ML
25 INJECTION INTRAMUSCULAR; INTRAVENOUS ONCE
Status: COMPLETED | OUTPATIENT
Start: 2020-10-22 | End: 2020-10-22

## 2020-10-22 RX ORDER — ONDANSETRON 2 MG/ML
4 INJECTION INTRAMUSCULAR; INTRAVENOUS ONCE
Status: COMPLETED | OUTPATIENT
Start: 2020-10-22 | End: 2020-10-22

## 2020-10-22 RX ORDER — POTASSIUM CHLORIDE 20 MEQ/1
40 TABLET, EXTENDED RELEASE ORAL PRN
Status: DISCONTINUED | OUTPATIENT
Start: 2020-10-22 | End: 2020-10-26 | Stop reason: HOSPADM

## 2020-10-22 RX ORDER — FLUOXETINE HYDROCHLORIDE 20 MG/1
20 CAPSULE ORAL DAILY
Status: DISCONTINUED | OUTPATIENT
Start: 2020-10-23 | End: 2020-10-26 | Stop reason: HOSPADM

## 2020-10-22 RX ADMIN — ATORVASTATIN CALCIUM 40 MG: 40 TABLET, FILM COATED ORAL at 20:13

## 2020-10-22 RX ADMIN — IOPAMIDOL 75 ML: 755 INJECTION, SOLUTION INTRAVENOUS at 16:38

## 2020-10-22 RX ADMIN — ZOLPIDEM TARTRATE 10 MG: 5 TABLET ORAL at 20:13

## 2020-10-22 RX ADMIN — DIPHENHYDRAMINE HYDROCHLORIDE 25 MG: 50 INJECTION, SOLUTION INTRAMUSCULAR; INTRAVENOUS at 16:53

## 2020-10-22 RX ADMIN — ONDANSETRON 4 MG: 2 INJECTION INTRAMUSCULAR; INTRAVENOUS at 16:53

## 2020-10-22 RX ADMIN — SODIUM CHLORIDE 1000 ML: 9 INJECTION, SOLUTION INTRAVENOUS at 16:52

## 2020-10-22 RX ADMIN — METOCLOPRAMIDE HYDROCHLORIDE 10 MG: 5 INJECTION INTRAMUSCULAR; INTRAVENOUS at 16:53

## 2020-10-22 RX ADMIN — SODIUM CHLORIDE: 9 INJECTION, SOLUTION INTRAVENOUS at 20:23

## 2020-10-22 RX ADMIN — ENOXAPARIN SODIUM 40 MG: 40 INJECTION SUBCUTANEOUS at 20:13

## 2020-10-22 RX ADMIN — HYDROMORPHONE HYDROCHLORIDE 1 MG: 1 INJECTION, SOLUTION INTRAMUSCULAR; INTRAVENOUS; SUBCUTANEOUS at 16:53

## 2020-10-22 RX ADMIN — PROMETHAZINE HYDROCHLORIDE 12.5 MG: 25 TABLET ORAL at 20:12

## 2020-10-22 RX ADMIN — OXYCODONE HYDROCHLORIDE AND ACETAMINOPHEN 2 TABLET: 5; 325 TABLET ORAL at 20:13

## 2020-10-22 RX ADMIN — TRAZODONE HYDROCHLORIDE 200 MG: 100 TABLET ORAL at 20:13

## 2020-10-22 RX ADMIN — PANTOPRAZOLE SODIUM 40 MG: 40 TABLET, DELAYED RELEASE ORAL at 20:13

## 2020-10-22 RX ADMIN — Medication 10 ML: at 20:14

## 2020-10-22 ASSESSMENT — ENCOUNTER SYMPTOMS
ABDOMINAL PAIN: 1
NAUSEA: 1
VOMITING: 1
SHORTNESS OF BREATH: 0
EYE PAIN: 0
COUGH: 0
BACK PAIN: 0
DIARRHEA: 0

## 2020-10-22 ASSESSMENT — PAIN SCALES - GENERAL
PAINLEVEL_OUTOF10: 6
PAINLEVEL_OUTOF10: 9
PAINLEVEL_OUTOF10: 4
PAINLEVEL_OUTOF10: 9
PAINLEVEL_OUTOF10: 8

## 2020-10-22 ASSESSMENT — PAIN DESCRIPTION - FREQUENCY
FREQUENCY: CONTINUOUS
FREQUENCY: CONTINUOUS

## 2020-10-22 ASSESSMENT — PAIN DESCRIPTION - LOCATION
LOCATION: ABDOMEN
LOCATION: ABDOMEN

## 2020-10-22 ASSESSMENT — PAIN DESCRIPTION - PROGRESSION: CLINICAL_PROGRESSION: NOT CHANGED

## 2020-10-22 ASSESSMENT — PAIN DESCRIPTION - PAIN TYPE
TYPE: ACUTE PAIN
TYPE: ACUTE PAIN;CHRONIC PAIN

## 2020-10-22 ASSESSMENT — PAIN - FUNCTIONAL ASSESSMENT: PAIN_FUNCTIONAL_ASSESSMENT: ACTIVITIES ARE NOT PREVENTED

## 2020-10-22 ASSESSMENT — PAIN DESCRIPTION - DESCRIPTORS
DESCRIPTORS: SHARP;CRAMPING
DESCRIPTORS: SHARP;CRAMPING

## 2020-10-22 NOTE — ED NOTES
Bed: S-  Expected date:   Expected time:   Means of arrival:   Comments:     Esme Reyes RN  10/22/20 7536

## 2020-10-22 NOTE — ED PROVIDER NOTES
[address] [facility]        Pt Name: Gordon Barrera  MRN: [id number]  [place] 1970  Date of evaluation: 10/22/2020  Provider: JEMAL Xiong  PCP: Luis Abraham MD    PEDRO. I have evaluated this patient. My supervising physician was available for consultation. CHIEF COMPLAINT       Chief Complaint   Patient presents with    Emesis     pt had lysis of adhesions and hernia repair last tuesday. pt states unable to keep anything down since. pt unable to eat or drink. last bm today.  Abdominal Pain     pt ran out of pain medication on monday, pain started back yesterday        HISTORY OF PRESENT ILLNESS   (Location, Timing/Onset, Context/Setting, Quality, Duration, Modifying Factors, Severity, Associated Signs and Symptoms)  Note limiting factors. Gordon Barrera is a 53 y.o. male with PMH of SBO and adhesions is now s/p surgery for lysis of adhesions and hernia repair 10/13/2020,  Who presents to the ED for evaluation of abdominal pain and emesis. Patient states he's been doing pretty well since his surgery although he has not had appetite and feels full within a couple bites. He ran out of his Oxycodone on Monday, 3 days ago. He developed nausea and vomiting last night after trying to eat mashed potatoes. Has not been able to tolerate much water at all today and has not eaten today. He reports 9/20 periumbilical abdominal pain today. It is sharp and cramping. He does not think this feels like previous SBOs. He called his surgeon's office today who advised liquid diet and keep his follow up this coming Monday, but if the pain got worse to go to the ED for evaluation. The patient denies fever or chills, chest pain, shortness of breath. No other acute concerns, associated symptoms or modifying factors. Nursing Notes were all reviewed and agreed with or any disagreements were addressed in the HPI.     REVIEW OF SYSTEMS    (2-9 systems for level 4, 10 or more for level 5)     Review of Systems   Constitutional: Negative for chills, fatigue and fever. Eyes: Negative for pain. Respiratory: Negative for cough and shortness of breath. Cardiovascular: Negative for chest pain. Gastrointestinal: Positive for abdominal pain, nausea and vomiting. Negative for diarrhea. Genitourinary: Negative for dysuria. Musculoskeletal: Negative for back pain, neck pain and neck stiffness. Skin: Negative for rash. Neurological: Negative for dizziness and headaches. Psychiatric/Behavioral: Negative for confusion. Positives and Pertinent negatives as per HPI. Except as noted above in the ROS, all other systems were reviewed and negative.        PAST MEDICAL HISTORY     Past Medical History:   Diagnosis Date    Alcoholism [facility])     last drink 2015    Allergic migraine with status migrainosus     Allergic rhinitis, seasonal     Anemia     Arthritis     right knee    Vaughn's esophagus     Benign intracranial hypertension     Cancer (HCC)     renal     Carpal tunnel syndrome     Chronic pain     Depression     Depression     GERD (gastroesophageal reflux disease)     Headache(784.0)     History of blood transfusion     History of tobacco use     Quit 7/2014    Migraine     chronic for 1 year    Morbid obesity ([address]     Movement disorder     Onychomycosis     Sleep apnea, obstructive     Severe uses cpap stop bang 6    Unspecified cerebral artery occlusion with cerebral infarction 2014    slight weakness in left arm    Wears dentures     full set    Wears glasses          SURGICAL HISTORY     Past Surgical History:   Procedure Laterality Date    ABDOMEN SURGERY      gastric bypass    ABDOMEN SURGERY  4-7-2016    repair of recurrent incisional hernia with mesh, removal of old mesh, bilateral component separation    ABDOMINAL EXPLORATION SURGERY  1/11/16    exp lap, lysis of adhesions, small bowel once every 3 months    FLUOXETINE (PROZAC) 20 MG CAPSULE    Take 1 capsule by mouth daily    MULTIPLE VITAMIN TABS    Take 1 tablet by mouth daily     PANTOPRAZOLE (PROTONIX) 40 MG TABLET    Take 1 tablet by mouth 2 times daily    SILDENAFIL (REVATIO) 20 MG TABLET    Take 4 tablets by mouth as needed (30 min prior to intercourse)    TRAZODONE (DESYREL) 100 MG TABLET    Take 2 tablets by mouth nightly    ZOLPIDEM (AMBIEN) 10 MG TABLET    Take 1 tablet by mouth nightly as needed for Sleep for up to 90 days. ALLERGIES     Nsaids; Morphine; Prochlorperazine; Valtrex [valacyclovir hcl]; Morphine and related; and Tolmetin    FAMILYHISTORY       Family History   Problem Relation Age of Onset    Cancer Father         Lymphoma    Arthritis Mother     Dementia Other     Diabetes Other     Cancer Other     Diabetes Maternal Uncle     Heart Disease Maternal Uncle     Depression Other     Heart Disease Maternal Uncle           SOCIAL HISTORY       Social History     Tobacco Use    Smoking status: Former Smoker     Packs/day: 0.50     Years: 25.00     Pack years: 12.50     Types: Cigarettes     Last attempt to quit: 7/31/2017     Years since quitting: 3.2    Smokeless tobacco: Never Used   Substance Use Topics    Alcohol use: No     Alcohol/week: 0.0 standard drinks     Comment: last drink 2015    Drug use: No       SCREENINGS             PHYSICAL EXAM    (up to 7 for level 4, 8 or more for level 5)     ED Triage Vitals [10/22/20 1321]   BP Temp Temp Source Pulse Resp SpO2 Height Weight   124/77 98.7 °F (37.1 °C) Oral 68 16 97 % 6' (1.829 m) 222 lb 10.6 oz (101 kg)       Physical Exam  Vitals signs and nursing note reviewed. Constitutional:       General: He is not in acute distress. Appearance: He is well-developed. He is ill-appearing. He is not diaphoretic. HENT:      Head: Normocephalic and atraumatic. Eyes:      General:         Right eye: No discharge. Left eye: No discharge.    Neck: Musculoskeletal: Normal range of motion and neck supple. Cardiovascular:      Rate and Rhythm: Normal rate and regular rhythm. Pulmonary:      Effort: Pulmonary effort is normal. No respiratory distress. Breath sounds: No stridor. Abdominal:      Palpations: Abdomen is soft. Tenderness: There is abdominal tenderness in the periumbilical area. Musculoskeletal: Normal range of motion. Skin:     General: Skin is warm and dry. Coloration: Skin is not pale. Neurological:      Mental Status: He is alert and oriented to person, place, and time. Comments: No gross facial drooping. Moves all 4 extremities spontaneously.    Psychiatric:         Behavior: Behavior normal.         DIAGNOSTIC RESULTS   LABS:    Labs Reviewed   CBC WITH AUTO DIFFERENTIAL - Abnormal; Notable for the following components:       Result Value    Hemoglobin 12.5 (*)     Hematocrit 38.6 (*)     RDW 17.5 (*)     Platelets 613 (*)     All other components within normal limits    Narrative:     Performed at:  [address] [employer]   Phone [phone]   COMPREHENSIVE METABOLIC PANEL W/ REFLEX TO MG FOR LOW K - Abnormal; Notable for the following components:    Glucose 130 (*)     BUN 6 (*)     Alkaline Phosphatase 145 (*)     ALT 8 (*)     AST 13 (*)     All other components within normal limits    Narrative:     Performed at:  [facility]  [address] [facility] [employer] [address]   Phone [phone]   URINE RT REFLEX TO CULTURE - Abnormal; Notable for the following components:    Clarity, UA CLOUDY (*)     Bilirubin Urine SMALL (*)     All other components within normal limits    Narrative:     Performed at:  [facility]  [address] [facility] [employer] [address]   Phone [phone]   MICROSCOPIC URINALYSIS - Abnormal; Notable for the following components:    Coarse Casts, UA 6-10 (*)     All other components within normal limits    Narrative:     Performed at:  [facility]  [address] [employer] [address]   Phone [phone]   LIPASE    Narrative:     Performed at:  [facility]  [address] [place], [place] [address]   Phone [phone]       All other labs were within normal range or not returned as of this dictation. EKG: All EKG's are interpreted by the Emergency Department Physician in the absence of a cardiologist.  Please see their note for interpretation of EKG. RADIOLOGY:   Non-plain film images such as CT, Ultrasound and MRI are read by the radiologist. Plain radiographic images are visualized and preliminarily interpreted by the ED Provider with the below findings:        Interpretation per the Radiologist below, if available at the time of this note:    CT ABDOMEN PELVIS W IV CONTRAST Additional Contrast? None   Final Result   1. Duodenal distention to the ligament of Treitz concerning for obstruction. There is the suggestion of slight twisting of the bowel at the duodenal   jejunal junction   2. Small amount of free intraperitoneal fluid in the right upper quadrant and   pelvis, with some dependent high density material in the pelvis likely   organized blood clot related to recent surgery   3. Cholelithiasis   4. Colonic diverticulosis   5. Status post splenectomy, left nephrectomy, gastric bypass           No results found.         PROCEDURES   Unless otherwise noted below, none     Procedures    CRITICAL CARE TIME   N/A    CONSULTS:  IP CONSULT TO GENERAL SURGERY  IP CONSULT TO GENERAL SURGERY      EMERGENCY DEPARTMENT COURSE and DIFFERENTIAL DIAGNOSIS/MDM:   Vitals:    Vitals:    10/22/20 1321 10/22/20 1603 10/22/20 1811   BP: 124/77 114/62 125/79   Pulse: 68 70 60   Resp: 16 16 16   Temp: 98.7 °F (37.1 °C) 98.7 °F (37.1 °C)    TempSrc: Oral Oral    SpO2: 97% 98% 98%   Weight: 222 lb obstruction, small amount of free intraperitoneal fluid likely organized blood clot related to the recent surgery he had. Consult placed to surgery for evaluation of possible small bowel obstruction. I have reviewed the patient's laboratory results. The patient has normal WBCs, hematocrit and platelets. They have no severe electrolyte abnormality or renal impairment. Lipase was normal.  Urinalysis negative for infection. Consulted with Dr. Ayden Coppola, surgery regarding the bowel obstruction. He advised consulting the surgeon who performed his recent surgery in [place] for transfer and admission at his facility. I consulted with Dr. Israel King, who advised that he did not do anything at all that would cause this all, he did was cut down adhesions. He states that if no one will accept the patient and care for them here then he would accept patient in transfer however he feels that this would not be the right thing for the patient. He advised that he would not do anything different that what they would do here. I discussed this with the patient and the patient prefers to stay here in [place] as he lives only 15 minutes away and he was only up in [place] because he sees his pain mgt dr there and was referred for this surgery. I spoke again with Dr. Ayden Coppola, who advised that I need to admit the patient to the hospitalist service and he will see the patient in the morning. If the patient starts to vomit again to place an NG tube. The patient has not vomited throughout his ED stay.     Consult placed to hospitalist.    Results for orders placed or performed during the hospital encounter of 10/22/20   CBC Auto Differential   Result Value Ref Range    WBC 7.7 4.0 - 11.0 K/uL    RBC 4.63 4.20 - 5.90 M/uL    Hemoglobin 12.5 (L) 13.5 - 17.5 g/dL    Hematocrit 38.6 (L) 40.5 - 52.5 %    MCV 83.5 80.0 - 100.0 fL    MCH 26.9 26.0 - 34.0 pg    MCHC 32.2 31.0 - 36.0 g/dL    RDW 17.5 (H) 12.4 - 15.4 %    Platelets Cells, UA 4 0 - 5 /HPF       I spoke with Dr. Angus Hernandez. We thoroughly discussed the history, physical exam, laboratory and imaging studies, as well as, emergency department course. Based upon that discussion, we've decided to admit Gordon Barrera for further observation and evaluation of Davis Payton's abdominal pain. As I have deemed necessary from their history, physical and studies, I have considered and evaluated Gordon Car for the following diagnoses:  ACUTE APPENDICITIS, BOWEL OBSTRUCTION, CHOLECYSTITIS, DIVERTICULITIS, INCARCERATED HERNIA, PANCREATITIS, or PERFORATED BOWEL or ULCER. FINAL IMPRESSION      1. Small bowel obstruction (HCC)    2. Abdominal pain, unspecified abdominal location    3. Nausea and vomiting, intractability of vomiting not specified, unspecified vomiting type          DISPOSITION/PLAN   DISPOSITION        PATIENT REFERREDTO:  No follow-up provider specified.     DISCHARGE MEDICATIONS:  New Prescriptions    No medications on file       DISCONTINUED MEDICATIONS:  Discontinued Medications    ASPIRIN 81 MG TABLET    Take 1 tablet by mouth daily for 14 days Take twice a day for 14 days after knee surgery then resume daily dosing    DICYCLOMINE (BENTYL) 10 MG CAPSULE    Take 1 capsule by mouth 4 times daily (before meals and nightly) for 7 days              (Please note that portions of this note were completed with a voice recognition program.  Efforts were made to edit the dictations but occasionally words are mis-transcribed.)    JEMAL Xiong (electronically signed)           JEMAL Xiong  10/22/20 7246

## 2020-10-22 NOTE — H&P
Hospital Medicine History & Physical      PCP: Shae Pena MD    Date of Admission: 10/22/2020    Date of Service: Pt seen/examined on 10/22/20 and Admitted to Inpatient     Chief Complaint: Abdominal pain    History Of Present Illness: The patient is a 53 y.o. male who presents to [facility] with abdominal pain, nausea and vomiting. Patient states that a week ago he had abdominal surgery for lysis of adhesions secondary to previous abdominal surgeries and hernias. Patient was discharged home with oral Percocet and ran out 3 days ago. Patient states that yesterday he started to have worsening abdominal pain associate with nausea and vomiting. He was concerned that it was a repeat small bowel obstruction so he came to the emergency department. In the ER CT was performed which did show possible small bowel obstruction. General surgery was consulted and recommended admission under the hospitalist service. Patient will be started on IV fluids, placed n.p.o. and general surgery will come assess the patient in the morning. Patient denies any fever chills, diarrhea constipation, chest pain or shortness of breath.     Past Medical History:        Diagnosis Date    Alcoholism [facility])     last drink 2015    Allergic migraine with status migrainosus     Allergic rhinitis, seasonal     Anemia     Arthritis     right knee    Vaughn's esophagus     Benign intracranial hypertension     Cancer (HCC)     renal     Carpal tunnel syndrome     Chronic pain     Depression     Depression     GERD (gastroesophageal reflux disease)     Headache(784.0)     History of blood transfusion     History of tobacco use     Quit 7/2014    Migraine     chronic for 1 year    Morbid obesity ([address]     Movement disorder     Onychomycosis     Sleep apnea, obstructive Severe uses cpap stop bang 6    Unspecified cerebral artery occlusion with cerebral infarction 2014    slight weakness in left arm    Wears dentures     full set    Wears glasses        Past Surgical History:        Procedure Laterality Date    ABDOMEN SURGERY      gastric bypass    ABDOMEN SURGERY  4-7-2016    repair of recurrent incisional hernia with mesh, removal of old mesh, bilateral component separation    ABDOMINAL EXPLORATION SURGERY  1/11/16    exp lap, lysis of adhesions, small bowel resection    CARPAL TUNNEL RELEASE      bilat    DILATATION, ESOPHAGUS      ENDOSCOPY, COLON, DIAGNOSTIC      EYE SURGERY      GASTRIC BYPASS SURGERY  Jan 2009    Has lost about 200 pounds.  HERNIA REPAIR  3-    ventral    JOINT REPLACEMENT      KIDNEY REMOVAL Left 08/01/2018    [facility]     KNEE ARTHROSCOPY Right 7/11/12    RIGHT KNEE ARTHROSCOPY WITH CHONDROPLASTY    KNEE SURGERY  July 2012    right, arthroscopy    KNEE SURGERY Right 2/18/2020    INCISION AND DRAINAGE RIGHT KNEE AND WOULD VAC PLACEMENT performed by Geraldine Cristina MD at [address]  2005    OTHER SURGICAL HISTORY Left 03/08/2016    CT biopsy ablasion left kidney    OTHER SURGICAL HISTORY  2016    small intestine 12 inch removed, 2 hernia surgeries, another surgery to drain infection from incision.      REVISION TOTAL KNEE ARTHROPLASTY Right 12/17/2019    RIGHT REVISION TIBIA TOTAL KNEE REPLACEMENT performed by Geraldine Cristina MD at [address] [facility] Right 1/22/2020    RIGHT KNEE IRRIGATION AND DEBRIDEMENT WITH POLY EXCHANGE [facility] [address] [facility]  10[facility]    RIGHT    TOTAL KNEE ARTHROPLASTY Right 8/12/2020    RIGHT ARTHROPLASY  RESECTION WITH INSERTION OF SPACER performed by Geraldine Cristina MD at [place] [address]  6-7-2016    HCT 38.6*   *      RENAL  Recent Labs     10/22/20  1326      K 4.0      CO2 25   BUN 6*   CREATININE 1.0     LFT'S  Recent Labs     10/22/20  1326   AST 13*   ALT 8*   BILITOT 0.5   ALKPHOS 145*     COAG  No results for input(s): INR in the last 72 hours. CARDIAC ENZYMES  No results for input(s): CKTOTAL, CKMB, CKMBINDEX, TROPONINI in the last 72 hours. U/A:    Lab Results   Component Value Date    COLORU DK YELLOW 10/22/2020    WBCUA 5 10/22/2020    RBCUA 2 10/22/2020    CLARITYU CLOUDY 10/22/2020    SPECGRAV 1.017 10/22/2020    LEUKOCYTESUR Negative 10/22/2020    BLOODU Negative 10/22/2020    GLUCOSEU Negative 10/22/2020    GLUCOSEU NEGATIVE 02/07/2011       ABG  No results found for: ZEO9MBS, BEART, R1TZLYZB, PHART, THGBART, KHZ4UVD, PO2ART, QIE3BNK      PHYSICIANS CERTIFICATION:    I certify that Jimmy Burch is expected to be hospitalized for more than 2 midnights based on the following assessment and plan:      ASSESSMENT/PLAN:    Small bowel obstruction  Likely secondary to multiple abdominal surgeries  Hold on NG tube unless continued nausea and vomiting  N.p.o.  IV fluids  General surgery consulted    Nausea and vomiting  Likely secondary to small bowel obstruction  Supportive care  IV fluids    Abdominal pain  Likely secondary to small bowel obstruction  Percocet as needed    Hyperlipidemia  Continue home statin    Acid reflux  Continue home PPI    Depression  Continue home Prozac    DVT Prophylaxis: Lovenox  Diet: Diet NPO Effective Now  Code Status: Full Code  PT/OT Eval Status: Not applicable    Dispo -inpatient       Ambar Ledezma MD    Thank you Hui Geiger MD for the opportunity to be involved in this patient's care. If you have any questions or concerns please feel free to contact me at [phone].

## 2020-10-23 ENCOUNTER — APPOINTMENT (OUTPATIENT)
Dept: GENERAL RADIOLOGY | Age: 50
DRG: 390 | End: 2020-10-23
Payer: COMMERCIAL

## 2020-10-23 LAB
A/G RATIO: 1.6 (ref 1.1–2.2)
ALBUMIN SERPL-MCNC: 3.2 G/DL (ref 3.4–5)
ALP BLD-CCNC: 123 U/L (ref 40–129)
ALT SERPL-CCNC: 7 U/L (ref 10–40)
ANION GAP SERPL CALCULATED.3IONS-SCNC: 9 MMOL/L (ref 3–16)
AST SERPL-CCNC: 10 U/L (ref 15–37)
BASOPHILS ABSOLUTE: 0.1 K/UL (ref 0–0.2)
BASOPHILS RELATIVE PERCENT: 1.6 %
BILIRUB SERPL-MCNC: 0.4 MG/DL (ref 0–1)
BUN BLDV-MCNC: 7 MG/DL (ref 7–20)
CALCIUM SERPL-MCNC: 8.8 MG/DL (ref 8.3–10.6)
CHLORIDE BLD-SCNC: 107 MMOL/L (ref 99–110)
CO2: 25 MMOL/L (ref 21–32)
CREAT SERPL-MCNC: 0.8 MG/DL (ref 0.9–1.3)
EOSINOPHILS ABSOLUTE: 0.5 K/UL (ref 0–0.6)
EOSINOPHILS RELATIVE PERCENT: 6.6 %
GFR AFRICAN AMERICAN: >60
GFR NON-AFRICAN AMERICAN: >60
GLOBULIN: 2 G/DL
GLUCOSE BLD-MCNC: 77 MG/DL (ref 70–99)
HCT VFR BLD CALC: 33.6 % (ref 40.5–52.5)
HEMOGLOBIN: 10.8 G/DL (ref 13.5–17.5)
LYMPHOCYTES ABSOLUTE: 3.1 K/UL (ref 1–5.1)
LYMPHOCYTES RELATIVE PERCENT: 41.2 %
MCH RBC QN AUTO: 26.5 PG (ref 26–34)
MCHC RBC AUTO-ENTMCNC: 32.1 G/DL (ref 31–36)
MCV RBC AUTO: 82.5 FL (ref 80–100)
MONOCYTES ABSOLUTE: 0.6 K/UL (ref 0–1.3)
MONOCYTES RELATIVE PERCENT: 7.5 %
NEUTROPHILS ABSOLUTE: 3.2 K/UL (ref 1.7–7.7)
NEUTROPHILS RELATIVE PERCENT: 43.1 %
PDW BLD-RTO: 17.4 % (ref 12.4–15.4)
PLATELET # BLD: 432 K/UL (ref 135–450)
PMV BLD AUTO: 8.6 FL (ref 5–10.5)
POTASSIUM REFLEX MAGNESIUM: 3.7 MMOL/L (ref 3.5–5.1)
RBC # BLD: 4.08 M/UL (ref 4.2–5.9)
SODIUM BLD-SCNC: 141 MMOL/L (ref 136–145)
TOTAL PROTEIN: 5.2 G/DL (ref 6.4–8.2)
WBC # BLD: 7.5 K/UL (ref 4–11)

## 2020-10-23 PROCEDURE — 74018 RADEX ABDOMEN 1 VIEW: CPT

## 2020-10-23 PROCEDURE — 6370000000 HC RX 637 (ALT 250 FOR IP): Performed by: INTERNAL MEDICINE

## 2020-10-23 PROCEDURE — 1200000000 HC SEMI PRIVATE

## 2020-10-23 PROCEDURE — 85025 COMPLETE CBC W/AUTO DIFF WBC: CPT

## 2020-10-23 PROCEDURE — 6360000002 HC RX W HCPCS: Performed by: INTERNAL MEDICINE

## 2020-10-23 PROCEDURE — 2580000003 HC RX 258: Performed by: INTERNAL MEDICINE

## 2020-10-23 PROCEDURE — 80053 COMPREHEN METABOLIC PANEL: CPT

## 2020-10-23 PROCEDURE — 6370000000 HC RX 637 (ALT 250 FOR IP): Performed by: SURGERY

## 2020-10-23 PROCEDURE — APPNB15 APP NON BILLABLE TIME 0-15 MINS: Performed by: NURSE PRACTITIONER

## 2020-10-23 PROCEDURE — APPSS15 APP SPLIT SHARED TIME 0-15 MINUTES: Performed by: NURSE PRACTITIONER

## 2020-10-23 PROCEDURE — 99254 IP/OBS CNSLTJ NEW/EST MOD 60: CPT | Performed by: SURGERY

## 2020-10-23 PROCEDURE — 36415 COLL VENOUS BLD VENIPUNCTURE: CPT

## 2020-10-23 RX ORDER — POLYETHYLENE GLYCOL 3350 17 G/17G
17 POWDER, FOR SOLUTION ORAL 2 TIMES DAILY
Status: DISCONTINUED | OUTPATIENT
Start: 2020-10-23 | End: 2020-10-26 | Stop reason: HOSPADM

## 2020-10-23 RX ADMIN — HYDROMORPHONE HYDROCHLORIDE 0.5 MG: 1 INJECTION, SOLUTION INTRAMUSCULAR; INTRAVENOUS; SUBCUTANEOUS at 19:44

## 2020-10-23 RX ADMIN — OXYCODONE HYDROCHLORIDE AND ACETAMINOPHEN 2 TABLET: 5; 325 TABLET ORAL at 21:39

## 2020-10-23 RX ADMIN — ZOLPIDEM TARTRATE 10 MG: 5 TABLET ORAL at 21:41

## 2020-10-23 RX ADMIN — SODIUM CHLORIDE: 9 INJECTION, SOLUTION INTRAVENOUS at 09:16

## 2020-10-23 RX ADMIN — FLUOXETINE 20 MG: 20 CAPSULE ORAL at 08:49

## 2020-10-23 RX ADMIN — ONDANSETRON 4 MG: 2 INJECTION INTRAMUSCULAR; INTRAVENOUS at 17:53

## 2020-10-23 RX ADMIN — PANTOPRAZOLE SODIUM 40 MG: 40 TABLET, DELAYED RELEASE ORAL at 15:21

## 2020-10-23 RX ADMIN — HYDROMORPHONE HYDROCHLORIDE 0.5 MG: 1 INJECTION, SOLUTION INTRAMUSCULAR; INTRAVENOUS; SUBCUTANEOUS at 10:41

## 2020-10-23 RX ADMIN — OXYCODONE HYDROCHLORIDE AND ACETAMINOPHEN 2 TABLET: 5; 325 TABLET ORAL at 03:45

## 2020-10-23 RX ADMIN — Medication 10 ML: at 10:41

## 2020-10-23 RX ADMIN — ENOXAPARIN SODIUM 40 MG: 40 INJECTION SUBCUTANEOUS at 19:43

## 2020-10-23 RX ADMIN — SODIUM CHLORIDE: 9 INJECTION, SOLUTION INTRAVENOUS at 21:41

## 2020-10-23 RX ADMIN — ONDANSETRON 4 MG: 2 INJECTION INTRAMUSCULAR; INTRAVENOUS at 22:09

## 2020-10-23 RX ADMIN — POLYETHYLENE GLYCOL 3350 17 G: 17 POWDER, FOR SOLUTION ORAL at 19:43

## 2020-10-23 RX ADMIN — TRAZODONE HYDROCHLORIDE 200 MG: 100 TABLET ORAL at 19:43

## 2020-10-23 RX ADMIN — ATORVASTATIN CALCIUM 40 MG: 40 TABLET, FILM COATED ORAL at 19:43

## 2020-10-23 RX ADMIN — OXYCODONE HYDROCHLORIDE AND ACETAMINOPHEN 2 TABLET: 5; 325 TABLET ORAL at 09:15

## 2020-10-23 RX ADMIN — OXYCODONE HYDROCHLORIDE AND ACETAMINOPHEN 2 TABLET: 5; 325 TABLET ORAL at 13:29

## 2020-10-23 RX ADMIN — PROMETHAZINE HYDROCHLORIDE 12.5 MG: 25 TABLET ORAL at 05:58

## 2020-10-23 RX ADMIN — PANTOPRAZOLE SODIUM 40 MG: 40 TABLET, DELAYED RELEASE ORAL at 05:56

## 2020-10-23 RX ADMIN — HYDROMORPHONE HYDROCHLORIDE 0.5 MG: 1 INJECTION, SOLUTION INTRAMUSCULAR; INTRAVENOUS; SUBCUTANEOUS at 15:21

## 2020-10-23 RX ADMIN — PROMETHAZINE HYDROCHLORIDE 12.5 MG: 25 TABLET ORAL at 12:47

## 2020-10-23 ASSESSMENT — PAIN DESCRIPTION - DESCRIPTORS
DESCRIPTORS: CRAMPING;SHARP
DESCRIPTORS: SORE
DESCRIPTORS: SHARP
DESCRIPTORS: SORE

## 2020-10-23 ASSESSMENT — PAIN - FUNCTIONAL ASSESSMENT
PAIN_FUNCTIONAL_ASSESSMENT: ACTIVITIES ARE NOT PREVENTED
PAIN_FUNCTIONAL_ASSESSMENT: PREVENTS OR INTERFERES SOME ACTIVE ACTIVITIES AND ADLS
PAIN_FUNCTIONAL_ASSESSMENT: PREVENTS OR INTERFERES SOME ACTIVE ACTIVITIES AND ADLS
PAIN_FUNCTIONAL_ASSESSMENT: ACTIVITIES ARE NOT PREVENTED

## 2020-10-23 ASSESSMENT — PAIN DESCRIPTION - LOCATION
LOCATION: ABDOMEN

## 2020-10-23 ASSESSMENT — PAIN DESCRIPTION - FREQUENCY
FREQUENCY: CONTINUOUS

## 2020-10-23 ASSESSMENT — PAIN SCALES - GENERAL
PAINLEVEL_OUTOF10: 0
PAINLEVEL_OUTOF10: 4
PAINLEVEL_OUTOF10: 8
PAINLEVEL_OUTOF10: 8
PAINLEVEL_OUTOF10: 4
PAINLEVEL_OUTOF10: 4
PAINLEVEL_OUTOF10: 0
PAINLEVEL_OUTOF10: 7
PAINLEVEL_OUTOF10: 7
PAINLEVEL_OUTOF10: 4
PAINLEVEL_OUTOF10: 4
PAINLEVEL_OUTOF10: 8
PAINLEVEL_OUTOF10: 8
PAINLEVEL_OUTOF10: 7

## 2020-10-23 ASSESSMENT — PAIN DESCRIPTION - PROGRESSION
CLINICAL_PROGRESSION: NOT CHANGED

## 2020-10-23 ASSESSMENT — PAIN DESCRIPTION - ONSET
ONSET: ON-GOING

## 2020-10-23 ASSESSMENT — PAIN DESCRIPTION - ORIENTATION
ORIENTATION: LOWER

## 2020-10-23 ASSESSMENT — PAIN DESCRIPTION - PAIN TYPE
TYPE: ACUTE PAIN

## 2020-10-23 NOTE — PLAN OF CARE
Problem: Falls - Risk of:  Goal: Will remain free from falls  Description: Will remain free from falls  10/23/2020 1418 by Adwoa Hyde RN  Outcome: Ongoing  10/23/2020 0025 by Pascale Pizano RN  Outcome: Ongoing  Goal: Absence of physical injury  Description: Absence of physical injury  10/23/2020 1418 by Adwoa Hyde RN  Outcome: Ongoing  10/23/2020 0025 by Pascale Pizano RN  Outcome: Ongoing     Problem: Pain:  Goal: Pain level will decrease  Description: Pain level will decrease  Outcome: Ongoing  Goal: Control of acute pain  Description: Control of acute pain  Outcome: Ongoing  Goal: Control of chronic pain  Description: Control of chronic pain  Outcome: Ongoing

## 2020-10-23 NOTE — PROGRESS NOTES
Hospitalist Progress Note      PCP: Jose Gonsalez MD    Date of Admission: 10/22/2020    Chief Complaint: Abd pain    Hospital Course: The patient is a 53 y.o. male who presents to [facility] with abdominal pain, nausea and vomiting. Patient states that a week ago he had abdominal surgery for lysis of adhesions secondary to previous abdominal surgeries and hernias. Patient was discharged home with oral Percocet and ran out 3 days ago. Patient states that yesterday he started to have worsening abdominal pain associate with nausea and vomiting. He was concerned that it was a repeat small bowel obstruction so he came to the emergency department. In the ER CT was performed which did show possible small bowel obstruction. General surgery was consulted and recommended admission under the hospitalist service. Patient will be started on IV fluids, placed n.p.o. and general surgery will come assess the patient in the morning. Subjective: Patient seen and examined. Some nausea overnight but no vomiting. Still with abdominal pain and asking for IV medication alternating with orl, dilaudid added on. Abd XR with decreasing air filled small bowel. Awaiting surgical input.        Medications:  Reviewed    Infusion Medications    sodium chloride 75 mL/hr at 10/22/20 2023     Scheduled Medications    FLUoxetine  20 mg Oral Daily    atorvastatin  40 mg Oral Nightly    pantoprazole  40 mg Oral BID AC    traZODone  200 mg Oral Nightly    sodium chloride flush  10 mL Intravenous 2 times per day    enoxaparin  40 mg Subcutaneous Nightly     PRN Meds: zolpidem, sodium chloride flush, acetaminophen **OR** acetaminophen, polyethylene glycol, promethazine **OR** ondansetron, potassium chloride **OR** potassium alternative oral replacement **OR** potassium chloride, magnesium sulfate, oxyCODONE-acetaminophen **OR** oxyCODONE-acetaminophen      Intake/Output Summary (Last 24 hours) at 10/23/2020 0854  Last Final Result   1. Duodenal distention to the ligament of Treitz concerning for obstruction. There is the suggestion of slight twisting of the bowel at the duodenal   jejunal junction   2. Small amount of free intraperitoneal fluid in the right upper quadrant and   pelvis, with some dependent high density material in the pelvis likely   organized blood clot related to recent surgery   3. Cholelithiasis   4. Colonic diverticulosis   5.  Status post splenectomy, left nephrectomy, gastric bypass         XR ABDOMEN (KUB) (SINGLE AP VIEW)    (Results Pending)   XR ABDOMEN (KUB) (SINGLE AP VIEW)    (Results Pending)           Assessment/Plan:    Small bowel obstruction  Likely secondary to multiple abdominal surgeries  Hold on NG tube unless continued nausea and vomiting  N.p.o.  IV fluids  General surgery consulted  No BM or gas now, still with some N     Nausea and vomiting  Likely secondary to small bowel obstruction  Supportive care  IV fluids  resolving     Abdominal pain  Likely secondary to small bowel obstruction  Percocet as needed, added on IV dilaudid     Hyperlipidemia  Continue home statin     Acid reflux  Continue home PPI     Depression  Continue home Prozac    DVT Prophylaxis: lovenox  Diet: Diet NPO Effective Now  Code Status: Full Code    PT/OT Eval Status: NA    Dispo - Neftali Miles MD

## 2020-10-23 NOTE — CONSULTS
[address] Surgery        [phone]        Surgery Consult    Pt Name: Liseth Grimm  MRN: [id number]  [place]: 1970  Date of evaluation: 10/23/2020  Primary Care Physician: Nataliya Laird MD    Chief Complaint   Patient presents with    Emesis     pt had lysis of adhesions and hernia repair last tuesday. pt states unable to keep anything down since. pt unable to eat or drink. last bm today.  Abdominal Pain     pt ran out of pain medication on monday, pain started back yesterday         Assessment/Plan   Partial SBO  -bowel movements overnight and passing flatus  -clinically and radiographically improved. Had some nausea after clears so will keep him here for now.   -We will advance diet as tolerated and when stable for discharge, recommend he follow-up soon with his surgeon Kerri. HPI   Liseth Grimm extensive past surgical history being seen in general surgical consultation for small bowel obstruction. Presented to ED 10/22/2020 with fatigue, nausea and emesis. Underwent 10/13 diagnostic laparoscopy with extensive lysis of adhesions, Dr. Yanique Martinez at a hospital in [place]. He was feeling okay until the fatigue started about 3 days ago. Feels similar to his prior bowel obstructions. Chronic abdominal pain, states he receives ketamine infusions through pain management clinic in [place]. 1022 CAT scan duodenal distention to the ligament of Treitz concerning for obstruction, small amount of free fluid right upper quadrant and pelvis possibly blood related to recent surgery, cholelithiasis, status post splenectomy, left nephrectomy, gastric bypass.       Past Medical History   has a past medical history of Alcoholism ([address] Allergic migraine with status migrainosus, Allergic rhinitis, seasonal, Anemia, Arthritis, Vaughn's esophagus, Benign intracranial hypertension, Cancer ([address] Carpal tunnel syndrome, Chronic pain, Depression, Depression, GERD (gastroesophageal reflux disease), Headache(784.0), History of blood transfusion, History of tobacco use, Migraine, Morbid obesity ([facility] [address] Movement disorder, Onychomycosis, Sleep apnea, obstructive, Unspecified cerebral artery occlusion with cerebral infarction, Wears dentures, and Wears glasses. Past Surgical History   has a past surgical history that includes Gastric bypass surgery (Jan 2009); Splenectomy; LASIK (2005); knee surgery (July 2012); Carpal tunnel release; laparotomy; Knee arthroscopy (Right, 7/11/2013); Knee arthroscopy (Right, 7/11/12); Total knee arthroplasty (10/15/13); Endoscopy, colon, diagnostic; Dilatation, esophagus; eye surgery; joint replacement; Abdominal exploration surgery (1/11/16); other surgical history (Left, 03/08/2016); hernia repair (3-); Upper gastrointestinal endoscopy (6-7-2016); other surgical history (2016); Upper gastrointestinal endoscopy (04/02/2018); Kidney removal (Left, 08/01/2018); Abdomen surgery; Abdomen surgery (4-7-2016); Revision total knee arthroplasty (Right, 12/17/2019); Revision total knee arthroplasty (Right, 1/22/2020); knee surgery (Right, 2/18/2020); and Total knee arthroplasty (Right, 8/12/2020). Medications  Prior to Admission medications    Medication Sig Start Date End Date Taking? Authorizing Provider   FLUoxetine (PROZAC) 20 MG capsule Take 1 capsule by mouth daily 10/8/20  Yes Chaitanya Wyatt MD   traZODone (DESYREL) 100 MG tablet Take 2 tablets by mouth nightly 10/8/20  Yes Chaitanya Wyatt MD   atorvastatin (LIPITOR) 40 MG tablet Take 1 tablet by mouth nightly At bedtime 10/5/20  Yes Yessy Ann MD   zolpidem (AMBIEN) 10 MG tablet Take 1 tablet by mouth nightly as needed for Sleep for up to 90 days.  9/14/20 12/13/20 Yes Tiffanie R Kehrt, APRN - CNP   acetaminophen (TYLENOL) 500 MG tablet Take 2 tablets by mouth 3 times daily (with meals)  Patient taking differently: Take 1,000 mg by mouth 3 times daily as needed  3/25/20 Yes Humza Rodriguez PA-C   pantoprazole (PROTONIX) 40 MG tablet Take 1 tablet by mouth 2 times daily 2/28/20  Yes Susan Hauser MD   sildenafil (REVATIO) 20 MG tablet Take 4 tablets by mouth as needed (30 min prior to intercourse) 6/12/19  Yes Susan Hauser MD   calcium-vitamin D (OSCAL 500/200 D-3) 500-200 MG-UNIT per tablet Take 1 tablet by mouth 2 times daily    Yes Historical Provider, MD   Multiple Vitamin TABS Take 1 tablet by mouth daily    Yes Historical Provider, MD   dextrose 5 % SOLN 50 mL with ketamine 100 MG/ML SOLN 50 mg Infuse intravenously continuous Indications: 350 mg once every 3 months    Historical Provider, MD    Scheduled Meds:   FLUoxetine  20 mg Oral Daily    atorvastatin  40 mg Oral Nightly    pantoprazole  40 mg Oral BID AC    traZODone  200 mg Oral Nightly    sodium chloride flush  10 mL Intravenous 2 times per day    enoxaparin  40 mg Subcutaneous Nightly     Continuous Infusions:   sodium chloride 75 mL/hr at 10/23/20 0916     PRN Meds:. HYDROmorphone **OR** HYDROmorphone, zolpidem, sodium chloride flush, acetaminophen **OR** acetaminophen, polyethylene glycol, promethazine **OR** ondansetron, potassium chloride **OR** potassium alternative oral replacement **OR** potassium chloride, magnesium sulfate, oxyCODONE-acetaminophen **OR** oxyCODONE-acetaminophen    Allergies  is allergic to nsaids; morphine; prochlorperazine; valtrex [valacyclovir hcl]; morphine and related; and tolmetin. Family History  Reviewed, non contribtory  family history includes Arthritis in his mother; Cancer in his father and another family member; Dementia in an other family member; Depression in an other family member; Diabetes in his maternal uncle and another family member; Heart Disease in his maternal uncle and maternal uncle. Social History  Reviewed, non contributory   reports that he quit smoking about 3 years ago. His smoking use included cigarettes.  He has a 12.50 pack-year smoking history. He has never used smokeless tobacco. He reports that he does not drink alcohol or use drugs. Review of Systems:  12 point review of systems was reviewed and negative except for that listed in HPI    OBJECTIVE:   VITALS:  height is 6' (1.829 m) and weight is 220 lb 14.4 oz (100.2 kg). His oral temperature is 98.4 °F (36.9 °C). His blood pressure is 97/58 (abnormal) and his pulse is 45 (abnormal). His respiration is 18 and oxygen saturation is 93%. CONSTITUTIONAL: Alert and oriented times 3, no acute distress and cooperative to examination with proper mood and affect. SKIN: Skin color, texture, turgor normal. No rashes or lesions. LYMPH: no cervical nodes, no inguinal nodes  HEENT: Head is normocephalic, atraumatic. EOMI, PERRLA. NECK: Supple, symmetrical, trachea midline, no adenopathy, thyroid symmetric, not enlarged and no tenderness, skin normal.  CHEST/LUNGS: chest symmetric with normal A/P diameter, normal respiratory rate and rhythm, lungs clear to auscultation without wheezes, rales or rhonchi. No accessory muscle use. CARDIOVASCULAR: Heart sounds are normal.  Regular rate and rhythm without murmur, gallop or rub. ABDOMEN: Soft, non-tender, non-distended. Scars consistent with surgical history   RECTAL: deferred, not clinically indicated  NEUROLOGIC: There are no focalizing motor or sensory deficits. CN II-XII are grossly intact. EXTREMITIES: no cyanosis, no clubbing and no edema.     LABS:     Recent Labs     10/22/20  1326 10/22/20  1556 10/23/20  0554   WBC 7.7  --  7.5   HGB 12.5*  --  10.8*   HCT 38.6*  --  33.6*   *  --  432     --  141   K 4.0  --  3.7     --  107   CO2 25  --  25   BUN 6*  --  7   CREATININE 1.0  --  0.8*   CALCIUM 9.0  --  8.8   AST 13*  --  10*   ALT 8*  --  7*   BILITOT 0.5  --  0.4   NITRU  --  Negative  --    COLORU  --  DK YELLOW  --      Recent Labs     10/22/20  1326 10/23/20  0554   ALKPHOS 145* 123   ALT 8* 7*   AST 13* 10* BILITOT 0.5 0.4   LABALBU 3.6 3.2*   LIPASE 21.0  --          RADIOLOGY:   I have personally reviewed the following films:  XR ABDOMEN (KUB) (SINGLE AP VIEW)   Final Result   Decreased air-filled small bowel dilation suggesting improving/decreasing   small bowel obstruction. No new findings on this examination. No evidence   of acute process elsewhere. CT ABDOMEN PELVIS W IV CONTRAST Additional Contrast? None   Final Result   1. Duodenal distention to the ligament of Treitz concerning for obstruction. There is the suggestion of slight twisting of the bowel at the duodenal   jejunal junction   2. Small amount of free intraperitoneal fluid in the right upper quadrant and   pelvis, with some dependent high density material in the pelvis likely   organized blood clot related to recent surgery   3. Cholelithiasis   4. Colonic diverticulosis   5. Status post splenectomy, left nephrectomy, gastric bypass         XR ABDOMEN (KUB) (SINGLE AP VIEW)    (Results Pending)        Thank you for the interesting evaluation. Further recommendations to follow. EDUCATION  Patient educated about the following as pertinent to medical/surgical problems: Disease Process, Medications, Smoking Cessation, Oxygenation, Incentive Spirometry and Deep Breath and Cough, Diabetes, Hyperlipidemia, Smoking Cessation, Nutrition, Exercise and Hypertension    Electronically signed by LILLIE Heaton - CNP on 10/23/2020 at 12:40 PM    The note was completed using Dragon voice recognition transcription. Every effort was made to ensure accuracy; however, inadvertent transcription errors may be present despite my best efforts to edit errors. Surgery Staff  I have examined this patient and read and agree with the note by LILLIE Mireles from today. Chart, labs, imaging reviewed  Multiple BMs since admission    A/P  Postop ileus vs PSBO  Patient well known to us.   History chronic pain and intermittent SBOs that resolve conservativeley. Underwent laparoscopic KRIS 9 days ago at outside facility  Admitted with PSBO vs postop ileus. Clinically and radiographically improved  Trial clear liquids.   Slowly advance diet as tolerated  Add miralax with history chronic pain, narcotic use  Hopeful for D/C this weekend for F/U primary surgeon on Monday in [facility]    Electronically signed by Svetlana Coleman MD on 10/23/2020 at 4:17 PM

## 2020-10-23 NOTE — PROGRESS NOTES
4 Eyes Skin Assessment     NAME:  Blue Arango OF BIRTH:  1970  MEDICAL RECORD NUMBER:  [id number]    The patient is being assess for  Admission    I agree that 2 RN's have performed a thorough Head to Toe Skin Assessment on the patient. ALL assessment sites listed below have been assessed. Areas assessed by both nurses:    Head, Face, Ears, Shoulders, Back, Chest, Arms, Elbows, Hands, Sacrum. Buttock, Coccyx, Ischium and Legs. Feet and Heels        Does the Patient have a Wound? Yes wound(s) were present on assessment.  LDA wound assessment was Initiated and completed        Andrei Prevention initiated:  NA   Wound Care Orders initiated:  NA    Pressure Injury (Stage 3,4, Unstageable, DTI, NWPT, and Complex wounds) if present place consult order under [de-identified] NA    New and Established Ostomies if present place consult order under : NA      Nurse 1 eSignature: Electronically signed by Teresa Duarte RN on 10/23/20 at 12:49 AM EDT    **SHARE this note so that the co-signing nurse is able to place an eSignature**    Nurse 2 eSignature: Electronically signed by Keren Matias RN on 10/23/20 at 8:12 AM EDT

## 2020-10-23 NOTE — CARE COORDINATION
INITIAL CASE MANAGEMENT ASSESSMENT    Reviewed chart, met with patient to assess possible discharge needs. Explained Case Management role/services. Living Situation: Lives with Wife and children in a house with 5 steps to enter. ADLs: Independent     DME: CPAP    PT/OT Recs: not ordered     Active Services: None     Transportation: Active /Wife will transport     Medications: [employer]/No barriers    PCP: Paula Marcano MD      HD/PD: N/A    PLAN/COMMENTS: Plan is to return to home with family support. SW/CM provided contact information for patient or family to call with any questions. SW/CM will follow and assist as needed. Electronically signed by Colin Jara RN on 10/23/2020 at 3:48 PM

## 2020-10-24 ENCOUNTER — APPOINTMENT (OUTPATIENT)
Dept: GENERAL RADIOLOGY | Age: 50
DRG: 390 | End: 2020-10-24
Payer: COMMERCIAL

## 2020-10-24 PROCEDURE — 94761 N-INVAS EAR/PLS OXIMETRY MLT: CPT

## 2020-10-24 PROCEDURE — 6360000002 HC RX W HCPCS: Performed by: INTERNAL MEDICINE

## 2020-10-24 PROCEDURE — 6370000000 HC RX 637 (ALT 250 FOR IP): Performed by: INTERNAL MEDICINE

## 2020-10-24 PROCEDURE — 1200000000 HC SEMI PRIVATE

## 2020-10-24 PROCEDURE — 6370000000 HC RX 637 (ALT 250 FOR IP): Performed by: SURGERY

## 2020-10-24 PROCEDURE — 2580000003 HC RX 258: Performed by: INTERNAL MEDICINE

## 2020-10-24 PROCEDURE — 74018 RADEX ABDOMEN 1 VIEW: CPT

## 2020-10-24 RX ADMIN — HYDROMORPHONE HYDROCHLORIDE 0.5 MG: 1 INJECTION, SOLUTION INTRAMUSCULAR; INTRAVENOUS; SUBCUTANEOUS at 11:21

## 2020-10-24 RX ADMIN — ONDANSETRON 4 MG: 2 INJECTION INTRAMUSCULAR; INTRAVENOUS at 15:01

## 2020-10-24 RX ADMIN — ONDANSETRON 4 MG: 2 INJECTION INTRAMUSCULAR; INTRAVENOUS at 06:09

## 2020-10-24 RX ADMIN — Medication 10 ML: at 08:50

## 2020-10-24 RX ADMIN — SODIUM CHLORIDE: 9 INJECTION, SOLUTION INTRAVENOUS at 11:22

## 2020-10-24 RX ADMIN — OXYCODONE HYDROCHLORIDE AND ACETAMINOPHEN 2 TABLET: 5; 325 TABLET ORAL at 08:50

## 2020-10-24 RX ADMIN — ONDANSETRON 4 MG: 2 INJECTION INTRAMUSCULAR; INTRAVENOUS at 20:58

## 2020-10-24 RX ADMIN — PANTOPRAZOLE SODIUM 40 MG: 40 TABLET, DELAYED RELEASE ORAL at 16:36

## 2020-10-24 RX ADMIN — TRAZODONE HYDROCHLORIDE 200 MG: 100 TABLET ORAL at 20:58

## 2020-10-24 RX ADMIN — HYDROMORPHONE HYDROCHLORIDE 0.5 MG: 1 INJECTION, SOLUTION INTRAMUSCULAR; INTRAVENOUS; SUBCUTANEOUS at 19:55

## 2020-10-24 RX ADMIN — PANTOPRAZOLE SODIUM 40 MG: 40 TABLET, DELAYED RELEASE ORAL at 06:15

## 2020-10-24 RX ADMIN — ATORVASTATIN CALCIUM 40 MG: 40 TABLET, FILM COATED ORAL at 20:58

## 2020-10-24 RX ADMIN — HYDROMORPHONE HYDROCHLORIDE 0.5 MG: 1 INJECTION, SOLUTION INTRAMUSCULAR; INTRAVENOUS; SUBCUTANEOUS at 00:15

## 2020-10-24 RX ADMIN — HYDROMORPHONE HYDROCHLORIDE 0.5 MG: 1 INJECTION, SOLUTION INTRAMUSCULAR; INTRAVENOUS; SUBCUTANEOUS at 15:35

## 2020-10-24 RX ADMIN — OXYCODONE HYDROCHLORIDE AND ACETAMINOPHEN 2 TABLET: 5; 325 TABLET ORAL at 16:37

## 2020-10-24 RX ADMIN — ENOXAPARIN SODIUM 40 MG: 40 INJECTION SUBCUTANEOUS at 20:59

## 2020-10-24 RX ADMIN — POLYETHYLENE GLYCOL 3350 17 G: 17 POWDER, FOR SOLUTION ORAL at 08:50

## 2020-10-24 RX ADMIN — PROMETHAZINE HYDROCHLORIDE 12.5 MG: 25 TABLET ORAL at 08:50

## 2020-10-24 RX ADMIN — HYDROMORPHONE HYDROCHLORIDE 0.5 MG: 1 INJECTION, SOLUTION INTRAMUSCULAR; INTRAVENOUS; SUBCUTANEOUS at 06:09

## 2020-10-24 RX ADMIN — HYDROMORPHONE HYDROCHLORIDE 0.5 MG: 1 INJECTION, SOLUTION INTRAMUSCULAR; INTRAVENOUS; SUBCUTANEOUS at 23:56

## 2020-10-24 RX ADMIN — POLYETHYLENE GLYCOL 3350 17 G: 17 POWDER, FOR SOLUTION ORAL at 20:58

## 2020-10-24 RX ADMIN — SODIUM CHLORIDE: 9 INJECTION, SOLUTION INTRAVENOUS at 23:56

## 2020-10-24 RX ADMIN — FLUOXETINE 20 MG: 20 CAPSULE ORAL at 08:50

## 2020-10-24 ASSESSMENT — PAIN DESCRIPTION - LOCATION
LOCATION: ABDOMEN

## 2020-10-24 ASSESSMENT — PAIN DESCRIPTION - ORIENTATION
ORIENTATION: LEFT;LOWER
ORIENTATION: LOWER
ORIENTATION: LEFT;LOWER

## 2020-10-24 ASSESSMENT — PAIN SCALES - GENERAL
PAINLEVEL_OUTOF10: 4
PAINLEVEL_OUTOF10: 5
PAINLEVEL_OUTOF10: 6
PAINLEVEL_OUTOF10: 4
PAINLEVEL_OUTOF10: 8
PAINLEVEL_OUTOF10: 4
PAINLEVEL_OUTOF10: 8
PAINLEVEL_OUTOF10: 7

## 2020-10-24 ASSESSMENT — PAIN DESCRIPTION - DESCRIPTORS
DESCRIPTORS: CONSTANT
DESCRIPTORS: CONSTANT
DESCRIPTORS: SORE
DESCRIPTORS: CONSTANT;CRAMPING
DESCRIPTORS: CONSTANT;ACHING;CRAMPING
DESCRIPTORS: SORE
DESCRIPTORS: SORE
DESCRIPTORS: CONSTANT

## 2020-10-24 ASSESSMENT — PAIN DESCRIPTION - PROGRESSION
CLINICAL_PROGRESSION: GRADUALLY WORSENING
CLINICAL_PROGRESSION: GRADUALLY WORSENING
CLINICAL_PROGRESSION: NOT CHANGED
CLINICAL_PROGRESSION: GRADUALLY IMPROVING
CLINICAL_PROGRESSION: NOT CHANGED
CLINICAL_PROGRESSION: GRADUALLY IMPROVING
CLINICAL_PROGRESSION: GRADUALLY WORSENING

## 2020-10-24 ASSESSMENT — ENCOUNTER SYMPTOMS
VOMITING: 0
NAUSEA: 0

## 2020-10-24 ASSESSMENT — PAIN DESCRIPTION - ONSET
ONSET: ON-GOING
ONSET: ON-GOING
ONSET: GRADUAL
ONSET: ON-GOING
ONSET: PROGRESSIVE
ONSET: GRADUAL

## 2020-10-24 ASSESSMENT — PAIN DESCRIPTION - PAIN TYPE
TYPE: ACUTE PAIN

## 2020-10-24 ASSESSMENT — PAIN DESCRIPTION - FREQUENCY
FREQUENCY: CONTINUOUS

## 2020-10-24 ASSESSMENT — PAIN - FUNCTIONAL ASSESSMENT
PAIN_FUNCTIONAL_ASSESSMENT: PREVENTS OR INTERFERES SOME ACTIVE ACTIVITIES AND ADLS

## 2020-10-24 NOTE — PLAN OF CARE
Problem: Falls - Risk of:  Goal: Will remain free from falls  Description: Will remain free from falls  10/23/2020 2350 by Chica Quezada RN  Outcome: Ongoing  10/23/2020 1418 by Colletta Jaegers, RN  Outcome: Ongoing  Goal: Absence of physical injury  Description: Absence of physical injury  10/23/2020 2350 by Chica Quezada RN  Outcome: Ongoing  10/23/2020 1418 by Colletta Jaegers, RN  Outcome: Ongoing     Problem: Pain:  Goal: Pain level will decrease  Description: Pain level will decrease  10/23/2020 2350 by Chica Quezada RN  Outcome: Ongoing  10/23/2020 1418 by Colletta Jaegers, RN  Outcome: Ongoing  Goal: Control of acute pain  Description: Control of acute pain  10/23/2020 2350 by Chica Quezada RN  Outcome: Ongoing  10/23/2020 1418 by Colletta Jaegers, RN  Outcome: Ongoing  Goal: Control of chronic pain  Description: Control of chronic pain  10/23/2020 2350 by Chica Quezada RN  Outcome: Ongoing  10/23/2020 1418 by Colletta Jaegers, RN  Outcome: Ongoing

## 2020-10-24 NOTE — PROGRESS NOTES
Progress Note  Date:10/24/2020       Room:A6Y-6968/4108-01  Patient Perlita Contreras     YOB: 1970     Age:50 y.o. Subjective    Subjective:  Symptoms:  Stable. Diet:  No nausea or vomiting (signficant emesis yesterday, now improved). Activity level: Normal.       Review of Systems   Gastrointestinal: Negative for nausea and vomiting (signficant emesis yesterday, now improved). Objective         Vitals Last 24 Hours:  TEMPERATURE:  Temp  Av.6 °F (37 °C)  Min: 98.2 °F (36.8 °C)  Max: 99.2 °F (37.3 °C)  RESPIRATIONS RANGE: Resp  Av.7  Min: 16  Max: 18  PULSE OXIMETRY RANGE: SpO2  Av.3 %  Min: 93 %  Max: 95 %  PULSE RANGE: Pulse  Av.5  Min: 48  Max: 67  BLOOD PRESSURE RANGE: Systolic (35KZY), VQY:457 , Min:100 , XCE:215   ; Diastolic (53NAD), CLARENCE:19, Min:56, Max:71    I/O (24Hr): Intake/Output Summary (Last 24 hours) at 10/24/2020 1319  Last data filed at 10/24/2020 1116  Gross per 24 hour   Intake 1020 ml   Output --   Net 1020 ml     Objective  Labs/Imaging/Diagnostics    Labs:  CBC:  Recent Labs     10/22/20  1326 10/23/20  0554   WBC 7.7 7.5   RBC 4.63 4.08*   HGB 12.5* 10.8*   HCT 38.6* 33.6*   MCV 83.5 82.5   RDW 17.5* 17.4*   * 432     CHEMISTRIES:  Recent Labs     10/22/20  1326 10/23/20  0554    141   K 4.0 3.7    107   CO2 25 25   BUN 6* 7   CREATININE 1.0 0.8*   GLUCOSE 130* 77     PT/INR:No results for input(s): PROTIME, INR in the last 72 hours. APTT:No results for input(s): APTT in the last 72 hours.   LIVER PROFILE:  Recent Labs     10/22/20  1326 10/23/20  0554   AST 13* 10*   ALT 8* 7*   BILITOT 0.5 0.4   ALKPHOS 145* 123       Imaging Last 24 Hours:  Xr Abdomen (kub) (single Ap View)    Result Date: 10/24/2020  EXAMINATION: ONE SUPINE XRAY VIEW(S) OF THE ABDOMEN 10/24/2020 8:22 am COMPARISON: 2 days ago HISTORY: ORDERING SYSTEM PROVIDED HISTORY: SBO TECHNOLOGIST PROVIDED HISTORY: Reason for exam:->SBO Reason for Exam: SBO 10/23/2020 Yes        Assessment & Plan    Nausea and vomiting   Two weeks postop from lysis of adhesions at OSH   Feels better today but had emesis all day yesterday   Will try clears again   If unable to tolerate will need imaging with contrast    Electronically signed by Alona Brandon MD on 10/24/2020 at 1:21 PM    Electronically signed by Alona Brandon MD on 10/24/20 at 1:19 PM EDT

## 2020-10-24 NOTE — PROGRESS NOTES
**OR** acetaminophen, promethazine **OR** ondansetron, potassium chloride **OR** potassium alternative oral replacement **OR** potassium chloride, magnesium sulfate, oxyCODONE-acetaminophen **OR** oxyCODONE-acetaminophen      Intake/Output Summary (Last 24 hours) at 10/24/2020 1053  Last data filed at 10/23/2020 1837  Gross per 24 hour   Intake 1440 ml   Output --   Net 1440 ml       Physical Exam Performed:    /67   Pulse 64   Temp 99.2 °F (37.3 °C) (Oral)   Resp 16   Ht 6' (1.829 m)   Wt 218 lb 0.6 oz (98.9 kg)   SpO2 93%   BMI 29.57 kg/m²     General appearance: No apparent distress, appears stated age and cooperative. HEENT: Pupils equal, round, and reactive to light. Conjunctivae/corneas clear. Neck: Supple, with full range of motion. No jugular venous distention. Trachea midline. Respiratory:  Normal respiratory effort. Clear to auscultation, bilaterally   Cardiovascular: Regular rate and rhythm with normal S1/S2   Abdomen: Soft, non-tender  Musculoskeletal: No clubbing, cyanosis or edema bilaterally. Full range of motion without deformity. Skin: Skin color, texture, turgor normal.  No rashes or lesions. Neurologic:  Neurovascularly intact without any focal sensory/motor deficits. Cranial nerves: II-XII intact, grossly non-focal.  Psychiatric: Alert and oriented, thought content appropriate, normal insight  Capillary Refill: Brisk,< 3 seconds       Labs:   Recent Labs     10/22/20  1326 10/23/20  0554   WBC 7.7 7.5   HGB 12.5* 10.8*   HCT 38.6* 33.6*   * 432     Recent Labs     10/22/20  1326 10/23/20  0554    141   K 4.0 3.7    107   CO2 25 25   BUN 6* 7   CREATININE 1.0 0.8*   CALCIUM 9.0 8.8     Recent Labs     10/22/20  1326 10/23/20  0554   AST 13* 10*   ALT 8* 7*   BILITOT 0.5 0.4   ALKPHOS 145* 123     No results for input(s): INR in the last 72 hours. No results for input(s): Les Lex in the last 72 hours.     Urinalysis:      Lab Results   Component Value Date    NITRU Negative 10/22/2020    WBCUA 5 10/22/2020    RBCUA 2 10/22/2020    BLOODU Negative 10/22/2020    SPECGRAV 1.017 10/22/2020    GLUCOSEU Negative 10/22/2020    GLUCOSEU NEGATIVE 02/07/2011       Radiology:  XR ABDOMEN (KUB) (SINGLE AP VIEW)   Final Result   Nonspecific pattern. There remain mildly dilated loops of small bowel in the   left upper quadrant. XR ABDOMEN (KUB) (SINGLE AP VIEW)   Final Result   Decreased air-filled small bowel dilation suggesting improving/decreasing   small bowel obstruction. No new findings on this examination. No evidence   of acute process elsewhere. CT ABDOMEN PELVIS W IV CONTRAST Additional Contrast? None   Final Result   1. Duodenal distention to the ligament of Treitz concerning for obstruction. There is the suggestion of slight twisting of the bowel at the duodenal   jejunal junction   2. Small amount of free intraperitoneal fluid in the right upper quadrant and   pelvis, with some dependent high density material in the pelvis likely   organized blood clot related to recent surgery   3. Cholelithiasis   4. Colonic diverticulosis   5.  Status post splenectomy, left nephrectomy, gastric bypass         XR ABDOMEN (KUB) (SINGLE AP VIEW)    (Results Pending)           Assessment/Plan:    Small bowel obstruction  Likely secondary to multiple abdominal surgeries  Hold on NG tube unless continued nausea and vomiting  clears  IV fluids  General surgery consulted  +N/V, gas, no BM     Nausea and vomiting  Likely secondary to small bowel obstruction  Supportive care  IV fluids  resolving     Abdominal pain  Likely secondary to small bowel obstruction  Percocet as needed, added on IV dilaudid  Stop dilaudid tomorrow     Hyperlipidemia  Continue home statin     Acid reflux  Continue home PPI     Depression  Continue home Prozac    DVT Prophylaxis: lovenox  Diet: DIET CLEAR LIQUID;  Code Status: Full Code    PT/OT Eval Status: NA    Dispo - Inpt    Tracee Eduardo Mustapha Sanches MD

## 2020-10-25 ENCOUNTER — APPOINTMENT (OUTPATIENT)
Dept: CT IMAGING | Age: 50
DRG: 390 | End: 2020-10-25
Payer: COMMERCIAL

## 2020-10-25 ENCOUNTER — APPOINTMENT (OUTPATIENT)
Dept: GENERAL RADIOLOGY | Age: 50
DRG: 390 | End: 2020-10-25
Payer: COMMERCIAL

## 2020-10-25 PROCEDURE — 6370000000 HC RX 637 (ALT 250 FOR IP): Performed by: SURGERY

## 2020-10-25 PROCEDURE — 6360000002 HC RX W HCPCS: Performed by: INTERNAL MEDICINE

## 2020-10-25 PROCEDURE — 1200000000 HC SEMI PRIVATE

## 2020-10-25 PROCEDURE — 6360000004 HC RX CONTRAST MEDICATION: Performed by: INTERNAL MEDICINE

## 2020-10-25 PROCEDURE — 6360000004 HC RX CONTRAST MEDICATION

## 2020-10-25 PROCEDURE — 6370000000 HC RX 637 (ALT 250 FOR IP): Performed by: INTERNAL MEDICINE

## 2020-10-25 PROCEDURE — 74018 RADEX ABDOMEN 1 VIEW: CPT

## 2020-10-25 PROCEDURE — 74177 CT ABD & PELVIS W/CONTRAST: CPT

## 2020-10-25 PROCEDURE — 99232 SBSQ HOSP IP/OBS MODERATE 35: CPT | Performed by: SURGERY

## 2020-10-25 PROCEDURE — 2580000003 HC RX 258: Performed by: INTERNAL MEDICINE

## 2020-10-25 PROCEDURE — 6360000002 HC RX W HCPCS: Performed by: STUDENT IN AN ORGANIZED HEALTH CARE EDUCATION/TRAINING PROGRAM

## 2020-10-25 RX ORDER — FENTANYL CITRATE 50 UG/ML
25 INJECTION, SOLUTION INTRAMUSCULAR; INTRAVENOUS EVERY 4 HOURS PRN
Status: DISCONTINUED | OUTPATIENT
Start: 2020-10-25 | End: 2020-10-26

## 2020-10-25 RX ADMIN — PANTOPRAZOLE SODIUM 40 MG: 40 TABLET, DELAYED RELEASE ORAL at 16:00

## 2020-10-25 RX ADMIN — FLUOXETINE 20 MG: 20 CAPSULE ORAL at 08:16

## 2020-10-25 RX ADMIN — PANTOPRAZOLE SODIUM 40 MG: 40 TABLET, DELAYED RELEASE ORAL at 07:04

## 2020-10-25 RX ADMIN — IOPAMIDOL 75 ML: 755 INJECTION, SOLUTION INTRAVENOUS at 19:52

## 2020-10-25 RX ADMIN — SODIUM CHLORIDE: 9 INJECTION, SOLUTION INTRAVENOUS at 14:43

## 2020-10-25 RX ADMIN — OXYCODONE HYDROCHLORIDE AND ACETAMINOPHEN 2 TABLET: 5; 325 TABLET ORAL at 11:28

## 2020-10-25 RX ADMIN — POLYETHYLENE GLYCOL 3350 17 G: 17 POWDER, FOR SOLUTION ORAL at 08:16

## 2020-10-25 RX ADMIN — ONDANSETRON 4 MG: 2 INJECTION INTRAMUSCULAR; INTRAVENOUS at 14:42

## 2020-10-25 RX ADMIN — Medication 6.25 MG: at 22:52

## 2020-10-25 RX ADMIN — OXYCODONE HYDROCHLORIDE AND ACETAMINOPHEN 2 TABLET: 5; 325 TABLET ORAL at 07:08

## 2020-10-25 RX ADMIN — Medication 10 ML: at 08:18

## 2020-10-25 RX ADMIN — IOHEXOL 50 ML: 240 INJECTION, SOLUTION INTRATHECAL; INTRAVASCULAR; INTRAVENOUS; ORAL at 18:58

## 2020-10-25 RX ADMIN — HYDROMORPHONE HYDROCHLORIDE 0.5 MG: 1 INJECTION, SOLUTION INTRAMUSCULAR; INTRAVENOUS; SUBCUTANEOUS at 04:36

## 2020-10-25 RX ADMIN — OXYCODONE HYDROCHLORIDE AND ACETAMINOPHEN 2 TABLET: 5; 325 TABLET ORAL at 16:00

## 2020-10-25 RX ADMIN — Medication 10 ML: at 22:20

## 2020-10-25 ASSESSMENT — ENCOUNTER SYMPTOMS: NAUSEA: 1

## 2020-10-25 ASSESSMENT — PAIN SCALES - GENERAL
PAINLEVEL_OUTOF10: 9
PAINLEVEL_OUTOF10: 7
PAINLEVEL_OUTOF10: 0
PAINLEVEL_OUTOF10: 9
PAINLEVEL_OUTOF10: 8
PAINLEVEL_OUTOF10: 7

## 2020-10-25 ASSESSMENT — PAIN SCALES - WONG BAKER
WONGBAKER_NUMERICALRESPONSE: 0
WONGBAKER_NUMERICALRESPONSE: 2
WONGBAKER_NUMERICALRESPONSE: 2

## 2020-10-25 ASSESSMENT — PAIN DESCRIPTION - FREQUENCY
FREQUENCY: CONTINUOUS

## 2020-10-25 ASSESSMENT — PAIN DESCRIPTION - PAIN TYPE
TYPE: ACUTE PAIN

## 2020-10-25 ASSESSMENT — PAIN DESCRIPTION - DESCRIPTORS
DESCRIPTORS: CONSTANT;CRAMPING
DESCRIPTORS: CRAMPING;SHARP;STABBING
DESCRIPTORS: CRAMPING;CONSTANT

## 2020-10-25 ASSESSMENT — PAIN DESCRIPTION - ORIENTATION
ORIENTATION: LEFT;LOWER

## 2020-10-25 ASSESSMENT — PAIN DESCRIPTION - ONSET
ONSET: ON-GOING

## 2020-10-25 ASSESSMENT — PAIN DESCRIPTION - PROGRESSION
CLINICAL_PROGRESSION: GRADUALLY WORSENING
CLINICAL_PROGRESSION: GRADUALLY IMPROVING

## 2020-10-25 ASSESSMENT — PAIN DESCRIPTION - LOCATION
LOCATION: ABDOMEN

## 2020-10-25 NOTE — PROGRESS NOTES
PROVIDED HISTORY: Reason for exam:->SBO FINDINGS: Surgical staples are noted in the left abdomen. There remain gaseous loops of small bowel in the left upper quadrant with associated suture. There is scattered gas in the colon, perhaps slightly increased. No pneumoperitoneum. Nonspecific pattern. Continued dilation of small bowel loops in the left upper quadrant. Xr Abdomen (kub) (single Ap View)    Result Date: 10/24/2020  EXAMINATION: ONE SUPINE XRAY VIEW(S) OF THE ABDOMEN 10/24/2020 8:22 am COMPARISON: 2 days ago HISTORY: ORDERING SYSTEM PROVIDED HISTORY: SBO TECHNOLOGIST PROVIDED HISTORY: Reason for exam:->SBO Reason for Exam: SBO Acuity: Acute Type of Exam: Ongoing FINDINGS: Gaseous loops of small bowel noted in the left upper quadrant, mildly dilated. The sigmoid colon is mildly gaseous, not significantly distended. A 2.3 cm urinary bladder stone in left adrenal calcifications are again noted. Nonspecific pattern. There remain mildly dilated loops of small bowel in the left upper quadrant.      Assessment//Plan           Hospital Problems           Last Modified POA    Small bowel obstruction ([address] 10/23/2020 Yes        Assessment & Plan    Small bowel obstruction   Wants to try solid food, states clear liquids are making him sick   X ray from yesterday with mild dilation of small bowel in LUQ, gas noted in the colon   Will try PO trial   If able to eat can be discharged   If unable will need follow up imaging in AM    Electronically signed by Tin Kemp MD on 10/25/2020 at 12:42 PM    Electronically signed by Tin Kemp MD on 10/25/20 at 12:39 PM EDT

## 2020-10-25 NOTE — PLAN OF CARE
Problem: Falls - Risk of:  Goal: Will remain free from falls  Description: Will remain free from falls  Outcome: Ongoing  Goal: Absence of physical injury  Description: Absence of physical injury  Outcome: Ongoing     Problem: Pain:  Goal: Pain level will decrease  Description: Pain level will decrease  10/25/2020 0829 by Janet Hill  Outcome: Ongoing  10/25/2020 0227 by Cherlynn Severance, RN  Outcome: Ongoing  Goal: Control of acute pain  Description: Control of acute pain  10/25/2020 0829 by Janet Hill  Outcome: Ongoing  10/25/2020 0227 by Cherlynn Severance, RN  Outcome: Ongoing  Goal: Control of chronic pain  Description: Control of chronic pain  10/25/2020 0829 by Janet Hill  Outcome: Ongoing  10/25/2020 0227 by Cherlynn Severance, RN  Outcome: Ongoing

## 2020-10-25 NOTE — PROGRESS NOTES
Pt's wife insisting to speak with MD regarding plan of care and requesting IV pain medication. Attempted to explain plan of care referencing MD notes and recommendations. Pt wife demanded to speak with MD stating she was not leaving until she did. Message sent to MD regarding this. Per MD no medication changes at this time. Call General surgery if worsen abd pain or N/V. Pt and wife notified of this. Pt and wife insisting a call be placed to general surgery. Charge nurse updated and call placed to Dr. Karen Cid. Per telephone conversation, stat CT of abd and pelvis to reevaluate the need for further intervention. Will continue to closely monitor and assess.

## 2020-10-25 NOTE — PROGRESS NOTES
General Surgery    Contacted by Nursing about ongoing abdominal pain    His IV meds were discontinued this AM and he asked to have his diet advanced    Given pain will repeat another CT with PO contrast this time to assess for obstruction    Additional recommendations pending CT results    Electronically signed by Anjelica Frederick MD on 10/25/2020 at 6:51 PM

## 2020-10-25 NOTE — PROGRESS NOTES
Patient's BP 89/55 at 2030, manually at 2220 was 98/54. Patient has had nausea, given Zofran at 2058, no vomiting. Received dilaudid at 1955 for a pain of 8/10. Sated pain improved to a 6/10. Patient requested Ambien, this RN is concern about the low BP and will re-evaluate. 2350 - /67. C/o pain 8/10, KARISSA, requests pain medicine. Dilaudid given. 0045 - C-pap on. Appears to be sleeping, easy respirations.

## 2020-10-25 NOTE — PROGRESS NOTES
Hospitalist Progress Note      PCP: Paula Marcano MD    Date of Admission: 10/22/2020    Chief Complaint: Abd pain    Hospital Course: The patient is a 53 y.o. male who presents to [facility] with abdominal pain, nausea and vomiting. Patient states that a week ago he had abdominal surgery for lysis of adhesions secondary to previous abdominal surgeries and hernias. Patient was discharged home with oral Percocet and ran out 3 days ago. Patient states that yesterday he started to have worsening abdominal pain associate with nausea and vomiting. He was concerned that it was a repeat small bowel obstruction so he came to the emergency department. In the ER CT was performed which did show possible small bowel obstruction. General surgery was consulted and recommended admission under the hospitalist service. Patient will be started on IV fluids, placed n.p.o. and general surgery will come assess the patient in the morning. 10/23 Some nausea overnight but no vomiting. Still with abdominal pain and asking for IV medication alternating with orl, dilaudid added on. Abd XR with decreasing air filled small bowel. Awaiting surgical input. 10/24 Still with nausea and vomiting. Passing gas but no BM. Gen surg recommend advance diet as tolerated. Needs to follow up with surgeon who performed ex lap. Will discontinue IV pain meds tomorrow and continue with oral meds    Subjective: Patient seen and examined. Passing gas, no BM. Nausea for the most part subsided. No further episodes of vomiting. Breakfast tray in front of him, will try to eat soon. KUB from this AM with pending read. Stopped IV narcotics and will stop oral narcotics tomorrow.        Medications:  Reviewed    Infusion Medications    sodium chloride 75 mL/hr at 10/24/20 3001     Scheduled Medications    polyethylene glycol  17 g Oral BID    FLUoxetine  20 mg Oral Daily    atorvastatin  40 mg Oral Nightly    pantoprazole  40 mg Oral BILITOT 0.5 0.4   ALKPHOS 145* 123     No results for input(s): INR in the last 72 hours. No results for input(s): Winford Eagle in the last 72 hours. Urinalysis:      Lab Results   Component Value Date    NITRU Negative 10/22/2020    WBCUA 5 10/22/2020    RBCUA 2 10/22/2020    BLOODU Negative 10/22/2020    SPECGRAV 1.017 10/22/2020    GLUCOSEU Negative 10/22/2020    GLUCOSEU NEGATIVE 02/07/2011       Radiology:  XR ABDOMEN (KUB) (SINGLE AP VIEW)   Final Result   Nonspecific pattern. There remain mildly dilated loops of small bowel in the   left upper quadrant. XR ABDOMEN (KUB) (SINGLE AP VIEW)   Final Result   Decreased air-filled small bowel dilation suggesting improving/decreasing   small bowel obstruction. No new findings on this examination. No evidence   of acute process elsewhere. CT ABDOMEN PELVIS W IV CONTRAST Additional Contrast? None   Final Result   1. Duodenal distention to the ligament of Treitz concerning for obstruction. There is the suggestion of slight twisting of the bowel at the duodenal   jejunal junction   2. Small amount of free intraperitoneal fluid in the right upper quadrant and   pelvis, with some dependent high density material in the pelvis likely   organized blood clot related to recent surgery   3. Cholelithiasis   4. Colonic diverticulosis   5.  Status post splenectomy, left nephrectomy, gastric bypass         XR ABDOMEN (KUB) (SINGLE AP VIEW)    (Results Pending)   XR ABDOMEN (KUB) (SINGLE AP VIEW)    (Results Pending)           Assessment/Plan:    Small bowel obstruction   Likely secondary to multiple abdominal surgeries and recent adhesiolysis  Hold on NG tube unless continued nausea and vomiting  clears  IV fluids  General surgery consulted  +N/V, gas, no BM  KUB pending     Nausea and vomiting  Likely secondary to small bowel obstruction  Supportive care  IV fluids  resolving     Abdominal pain  Likely secondary to small bowel obstruction  Percocet as needed, added on IV dilaudid  Stop dilaudid today, stop oral narcotics tomorrow     Hyperlipidemia  Continue home statin     Acid reflux  Continue home PPI     Depression  Continue home Prozac    DVT Prophylaxis: lovenox  Diet: DIET CLEAR LIQUID;  Code Status: Full Code    PT/OT Eval Status: NA    Dispo - Inpt, possible DC marleen pending diet    Tammie Spaulding MD

## 2020-10-26 ENCOUNTER — APPOINTMENT (OUTPATIENT)
Dept: GENERAL RADIOLOGY | Age: 50
DRG: 390 | End: 2020-10-26
Payer: COMMERCIAL

## 2020-10-26 VITALS
SYSTOLIC BLOOD PRESSURE: 113 MMHG | OXYGEN SATURATION: 94 % | TEMPERATURE: 98.5 F | WEIGHT: 225.53 LBS | DIASTOLIC BLOOD PRESSURE: 72 MMHG | HEIGHT: 72 IN | RESPIRATION RATE: 16 BRPM | HEART RATE: 60 BPM | BODY MASS INDEX: 30.55 KG/M2

## 2020-10-26 PROCEDURE — 74018 RADEX ABDOMEN 1 VIEW: CPT

## 2020-10-26 PROCEDURE — 6360000002 HC RX W HCPCS: Performed by: STUDENT IN AN ORGANIZED HEALTH CARE EDUCATION/TRAINING PROGRAM

## 2020-10-26 PROCEDURE — 6360000002 HC RX W HCPCS: Performed by: INTERNAL MEDICINE

## 2020-10-26 PROCEDURE — 6370000000 HC RX 637 (ALT 250 FOR IP): Performed by: SURGERY

## 2020-10-26 PROCEDURE — 6370000000 HC RX 637 (ALT 250 FOR IP): Performed by: INTERNAL MEDICINE

## 2020-10-26 PROCEDURE — 2580000003 HC RX 258: Performed by: INTERNAL MEDICINE

## 2020-10-26 RX ORDER — OXYCODONE HYDROCHLORIDE AND ACETAMINOPHEN 5; 325 MG/1; MG/1
1 TABLET ORAL EVERY 4 HOURS PRN
Status: DISCONTINUED | OUTPATIENT
Start: 2020-10-26 | End: 2020-10-26 | Stop reason: HOSPADM

## 2020-10-26 RX ORDER — PROMETHAZINE HYDROCHLORIDE 25 MG/ML
12.5 INJECTION, SOLUTION INTRAMUSCULAR; INTRAVENOUS EVERY 6 HOURS PRN
Status: DISCONTINUED | OUTPATIENT
Start: 2020-10-26 | End: 2020-10-26

## 2020-10-26 RX ORDER — OXYCODONE HYDROCHLORIDE AND ACETAMINOPHEN 5; 325 MG/1; MG/1
2 TABLET ORAL EVERY 4 HOURS PRN
Status: DISCONTINUED | OUTPATIENT
Start: 2020-10-26 | End: 2020-10-26 | Stop reason: HOSPADM

## 2020-10-26 RX ADMIN — FLUOXETINE 20 MG: 20 CAPSULE ORAL at 08:24

## 2020-10-26 RX ADMIN — PANTOPRAZOLE SODIUM 40 MG: 40 TABLET, DELAYED RELEASE ORAL at 07:50

## 2020-10-26 RX ADMIN — POLYETHYLENE GLYCOL 3350 17 G: 17 POWDER, FOR SOLUTION ORAL at 08:24

## 2020-10-26 RX ADMIN — TRAZODONE HYDROCHLORIDE 200 MG: 100 TABLET ORAL at 00:24

## 2020-10-26 RX ADMIN — ONDANSETRON 4 MG: 2 INJECTION INTRAMUSCULAR; INTRAVENOUS at 08:24

## 2020-10-26 RX ADMIN — FENTANYL CITRATE 25 MCG: 50 INJECTION, SOLUTION INTRAMUSCULAR; INTRAVENOUS at 00:24

## 2020-10-26 RX ADMIN — ENOXAPARIN SODIUM 40 MG: 40 INJECTION SUBCUTANEOUS at 00:33

## 2020-10-26 RX ADMIN — Medication 10 ML: at 08:24

## 2020-10-26 RX ADMIN — SODIUM CHLORIDE: 9 INJECTION, SOLUTION INTRAVENOUS at 07:50

## 2020-10-26 RX ADMIN — FENTANYL CITRATE 25 MCG: 50 INJECTION, SOLUTION INTRAMUSCULAR; INTRAVENOUS at 08:24

## 2020-10-26 ASSESSMENT — PAIN SCALES - GENERAL
PAINLEVEL_OUTOF10: 9
PAINLEVEL_OUTOF10: 4
PAINLEVEL_OUTOF10: 7
PAINLEVEL_OUTOF10: 4

## 2020-10-26 ASSESSMENT — PAIN DESCRIPTION - LOCATION
LOCATION: ABDOMEN
LOCATION: ABDOMEN

## 2020-10-26 ASSESSMENT — PAIN DESCRIPTION - PROGRESSION
CLINICAL_PROGRESSION: GRADUALLY WORSENING
CLINICAL_PROGRESSION: GRADUALLY IMPROVING

## 2020-10-26 ASSESSMENT — PAIN DESCRIPTION - FREQUENCY
FREQUENCY: CONTINUOUS
FREQUENCY: CONTINUOUS

## 2020-10-26 ASSESSMENT — PAIN DESCRIPTION - ORIENTATION
ORIENTATION: LEFT
ORIENTATION: LEFT

## 2020-10-26 ASSESSMENT — PAIN DESCRIPTION - DESCRIPTORS
DESCRIPTORS: SHARP;CRAMPING
DESCRIPTORS: SHARP;CRAMPING

## 2020-10-26 ASSESSMENT — PAIN DESCRIPTION - ONSET
ONSET: GRADUAL
ONSET: GRADUAL

## 2020-10-26 ASSESSMENT — PAIN DESCRIPTION - PAIN TYPE
TYPE: ACUTE PAIN
TYPE: ACUTE PAIN

## 2020-10-26 NOTE — PROGRESS NOTES
Reviewed dc instructions with pt. Pt verbalized understanding. PIV removed. Dressing clean dry and intact. Pt dc to private residence. Refused wheelchair. To vehicle. Pt dc with personal belongings.

## 2020-10-26 NOTE — PROGRESS NOTES
Patient states nausea has improved, pain continues. Fentanyl given. Scheduled Trazadone and Lovenox given.

## 2020-10-26 NOTE — PROGRESS NOTES
Patient here for abd pain/N/V from partial obstruction and bowel inflammation on CT, patient initially wanted to leave because annoyed with not getting appropriate care for his nausea since he was given oral medication but unable to keep it down to help nausea nor keep down the oral pain medication to help his abdominal pain. He has a surgeon specialist who performed a surgery for adhesions 3 weeks ago who he wanted to go see tomorrow since he feels the surgeon would be able to help him sooner. Patient looking tired and worn out, still having pain and nausea. Noted that patient was getting zofran but also oral phenergan that he couldn't hold down as well as oral percocet that he again couldn't hold down. Previously was receiving some dilaudid but had that stopped apparently to help his bowel motility. I discussed options with patient and patient agrees he doesn't feel safe enough to leave but voiced his concerns to me. Agreed to change patient's pain regiment for now to IV PRN fentanyl and also to for now give one dose of IV phenergan to help nausea and to help him rest. Patient will be reevaluated by day team and surgeon. Will continue to monitor overnight.

## 2020-10-26 NOTE — PROGRESS NOTES
General Surgery    CT reviewed    No acute surgical needs, no clear obstruction  No perforation, abscess or other explanation for patient's ongoing pain    Follow up X ray in AM to assess progression of contrast    Continue supportive care    Best to avoid narcotics to avoid side effects of decreased bowel activity    Electronically signed by Tate Melendez MD on 10/25/2020 at 9:17 PM

## 2020-10-26 NOTE — PROGRESS NOTES
mg Oral BID AC    traZODone  200 mg Oral Nightly    sodium chloride flush  10 mL Intravenous 2 times per day    enoxaparin  40 mg Subcutaneous Nightly     PRN Meds: fentanNYL, zolpidem, sodium chloride flush, acetaminophen **OR** acetaminophen, [DISCONTINUED] promethazine **OR** ondansetron, potassium chloride **OR** potassium alternative oral replacement **OR** potassium chloride, magnesium sulfate      Intake/Output Summary (Last 24 hours) at 10/26/2020 0853  Last data filed at 10/26/2020 0600  Gross per 24 hour   Intake 250 ml   Output --   Net 250 ml       Physical Exam Performed:    /77   Pulse 66   Temp 98.3 °F (36.8 °C) (Oral)   Resp 14   Ht 6' (1.829 m)   Wt 225 lb 8.5 oz (102.3 kg)   SpO2 94%   BMI 30.59 kg/m²     General appearance: No apparent distress, appears stated age and cooperative. HEENT: Pupils equal, round, and reactive to light. Conjunctivae/corneas clear. Neck: Supple, with full range of motion. No jugular venous distention. Trachea midline. Respiratory:  Normal respiratory effort. Clear to auscultation, bilaterally   Cardiovascular: Regular rate and rhythm with normal S1/S2   Abdomen: Soft, non-tender  Musculoskeletal: No clubbing, cyanosis or edema bilaterally. Full range of motion without deformity. Skin: Skin color, texture, turgor normal.  No rashes or lesions. Neurologic:  Neurovascularly intact without any focal sensory/motor deficits. Cranial nerves: II-XII intact, grossly non-focal.  Psychiatric: Alert and oriented, thought content appropriate, normal insight  Capillary Refill: Brisk,< 3 seconds       Labs:   No results for input(s): WBC, HGB, HCT, PLT in the last 72 hours. No results for input(s): NA, K, CL, CO2, BUN, CREATININE, CALCIUM, PHOS in the last 72 hours. Invalid input(s): MAGNES  No results for input(s): AST, ALT, BILIDIR, BILITOT, ALKPHOS in the last 72 hours. No results for input(s): INR in the last 72 hours.   No results for input(s): CKTOTAL, junction   2. Small amount of free intraperitoneal fluid in the right upper quadrant and   pelvis, with some dependent high density material in the pelvis likely   organized blood clot related to recent surgery   3. Cholelithiasis   4. Colonic diverticulosis   5. Status post splenectomy, left nephrectomy, gastric bypass         XR ABDOMEN (KUB) (SINGLE AP VIEW)    (Results Pending)           Assessment/Plan:    Small bowel obstruction   Likely secondary to multiple abdominal surgeries and recent adhesiolysis  Hold on NG tube unless continued nausea and vomiting  Advanced diet per patient request  IV fluids  General surgery consulted: ordered CT with contrast 10/25:  Multiple loops of mildly prominent small bowel in the left hemiabdomen   demonstrate wall thickening and air-fluid levels.  These are similar to the   prior study and could relate to enteritis/ileus versus partial obstruction. +N/V, gas, no BM  KUB with new air filled dilated loop of small bowel     Nausea and vomiting  Likely secondary to small bowel obstruction  Supportive care  IV fluids  resolving     Abdominal pain  Likely secondary to small bowel obstruction  Percocet as needed  Dilaudid stopped 10/25 but overnight physician started fentanyl. Will discuss with surgery as I do not feel IV narcotics are assisting the patient. He does not show objective signs of acute pain with HR in 50's and BP in 90's. IV pain meds are also decreasing gut motility which further exacerbates the possible bowel obstruction  Patient only receives Oxycodone 5mg sporadically, NOT CHRONICALLY, for his surgical issues. Patient is prescribed 5mg q6 or q4. Here we are giving him 10mg q4.  This should more than adequately control pain.      Hyperlipidemia  Continue home statin     Acid reflux  Continue home PPI     Depression  Continue home Prozac    DVT Prophylaxis: lovenox  Diet: DIET LOW FIBER;  Code Status: Full Code    PT/OT Eval Status: NA    Dispo - Inpt, possible DC marleen pending diet    Krista Person MD

## 2020-10-26 NOTE — PROGRESS NOTES
Dr. Erik Cochran spoke with patient and wife. The patient decided to go. Pherngan IVPB ordered - given. Fentanyl ordered. Will evaluate after Phenergan.

## 2020-10-26 NOTE — PROGRESS NOTES
Patient and wife in room - this RN read Dr. Ramon Beckett note. Patient states he did just throw up, its mostly water and some mucus or bile - per pt. Patient has low grade fever of 99.2. Patient states he might as well go home, he has Zofran at home. This RN sent a note to the NP, who stated she is on her way up.

## 2020-10-26 NOTE — DISCHARGE SUMMARY
Hospital Medicine Discharge Summary    Patient ID: Daryle Bishop      Patient's PCP: Hermelinda Quiñones MD    Admit Date: 10/22/2020     Discharge Date: 10/26/2020      Admitting Physician: Darrion Hernandez MD     Discharge Physician: Darrion Hernandez MD     Discharge Diagnoses: Active Hospital Problems    Diagnosis Date Noted    Small bowel obstruction [facility]) [N63.162] 04/03/2020       The patient was seen and examined on day of discharge and this discharge summary is in conjunction with any daily progress note from day of discharge. Hospital Course: The patient is a 50 y.o. male who presents to [facility] with abdominal pain, nausea and vomiting.  Patient states that a week ago he had abdominal surgery for lysis of adhesions secondary to previous abdominal surgeries and hernias.  Patient was discharged home with oral Percocet and ran out 3 days ago. Regina Bynum states that yesterday he started to have worsening abdominal pain associate with nausea and vomiting.  He was concerned that it was a repeat small bowel obstruction so he came to the emergency department.  In the ER CT was performed which did show possible small bowel obstruction.  General surgery was consulted and recommended admission under the hospitalist service. Regina Bynum will be started on IV fluids, placed n.p.o. and general surgery will come assess the patient in the morning.     10/23 Some nausea overnight but no vomiting. Still with abdominal pain and asking for IV medication alternating with orl, dilaudid added on. Abd XR with decreasing air filled small bowel. Awaiting surgical input.      10/24 Still with nausea and vomiting. Passing gas but no BM. Gen surg recommend advance diet as tolerated. Needs to follow up with surgeon who performed ex lap. Will discontinue IV pain meds tomorrow and continue with oral meds     10/25 Passing gas, no BM. Nausea for the most part subsided. No further episodes of vomiting.  Breakfast tray in front of him, will try to eat soon. KUB from this AM with pending read. Stopped IV narcotics and will stop oral narcotics tomorrow. On October 26 patient is asking to be discharged we can follow-up with his general surgeon who performed his adhesiolysis. Patient still having episodes of nausea with vomiting. Patient understands the risk versus benefit of leaving the hospital and going to see his general surgeon. Small bowel obstruction? Likely secondary to multiple abdominal surgeries and recent adhesiolysis  Hold on NG tube unless continued nausea and vomiting  Advanced diet per patient request  IV fluids  General surgery consulted: ordered CT with contrast 10/25:  Multiple loops of mildly prominent small bowel in the left hemiabdomen   demonstrate wall thickening and air-fluid levels.  These are similar to the   prior study and could relate to enteritis/ileus versus partial obstruction. +N/V, gas, no BM  KUB with new air filled dilated loop of small bowel     Nausea and vomiting  Likely secondary to small bowel obstruction  Supportive care  IV fluids  resolving     Abdominal pain  Likely secondary to small bowel obstruction  Percocet as needed  Dilaudid stopped 10/25 but overnight physician started fentanyl. Will discuss with surgery as I do not feel IV narcotics are assisting the patient. He does not show objective signs of acute pain with HR in 50's and BP in 90's. IV pain meds are also decreasing gut motility which further exacerbates the possible bowel obstruction  Patient only receives Oxycodone 5mg sporadically, NOT CHRONICALLY, for his surgical issues. Patient is prescribed 5mg q6 or q4. Here we are giving him 10mg q4.  This should more than adequately control pain.      Hyperlipidemia  Continue home statin     Acid reflux  Continue home PPI     Depression  Continue home Prozac      Exam:     /72   Pulse 60   Temp 98.5 °F (36.9 °C) (Oral)   Resp 16   Ht 6' (1.829 m)   Wt 225 lb 8.5 oz R-0Print      pantoprazole (PROTONIX) 40 MG tablet Take 1 tablet by mouth 2 times dailyHistorical Med      sildenafil (REVATIO) 20 MG tablet Take 4 tablets by mouth as needed (30 min prior to intercourse), Disp-30 tablet, R-5Normal      calcium-vitamin D (OSCAL 500/200 D-3) 500-200 MG-UNIT per tablet Take 1 tablet by mouth 2 times daily Historical Med      Multiple Vitamin TABS Take 1 tablet by mouth daily              Future Appointments   Date Time Provider Timothy Beal   11/10/2020  2:00 PM SCHEDULE, [address] [facility]   1/14/2021 10:00 AM Bhavana Dennis MD W ORTHO [facility]   3/19/2021 12:20 PM Tiffanie R Kehrt, APRN - CNP FF SLEEP MED [facility]       Time Spent on discharge is more than 30 minutes in the examination, evaluation, counseling and review of medications and discharge plan. Signed:    Cory Sinha MD   10/26/2020      Thank you Susan Hauser MD for the opportunity to be involved in this patient's care. If you have any questions or concerns please feel free to contact me at [phone].

## 2020-10-27 ENCOUNTER — CARE COORDINATION (OUTPATIENT)
Dept: CASE MANAGEMENT | Age: 50
End: 2020-10-27

## 2020-10-27 NOTE — CARE COORDINATION
Janay 45 Transitions Initial Follow Up Call    Call within 2 business days of discharge: Yes    Patient: Ninfa Sher Patient : 1970   MRN: [id number]  Reason for Admission: SBO  Discharge Date: 10/26/20 RARS: Readmission Risk Score: 52      Last Discharge [facility]       Complaint Diagnosis Description Type Department Provider    10/22/20 Emesis; Abdominal Pain Small bowel obstruction ([facility] [address] . .. ED to Hosp-Admission (Discharged) (ADMITTED) Karen Jean-Baptiste MD           Spoke with: patient, [address] [place] [address]    Non-face-to-face services provided:  Obtained and reviewed discharge summary and/or continuity of care documents    Care Transitions 24 Hour Call    Do you have all of your prescriptions and are they filled?:  Yes  Care Transitions Interventions     Spoke briefly with patient, Nikki Rios. He stated it was not a good time right now and was agreeable for writer to call back tomorrow.     Follow Up  Future Appointments   Date Time Provider Timothy Beal   11/10/2020  2:00 PM Jenelle Agrawal [facility]   2021 10:00 AM MD KATIE Ruvalcaba Se [facility]   3/19/2021 12:20 PM LILLIE Cevallos - CNP FF SLEEP MED [facility]       Lelia Bobo RN

## 2020-10-28 ENCOUNTER — CARE COORDINATION (OUTPATIENT)
Dept: CASE MANAGEMENT | Age: 50
End: 2020-10-28

## 2020-10-28 NOTE — CARE COORDINATION
Janay 45 Transitions Initial Follow Up Call    Call within 2 business days of discharge: Yes    Patient: Tom Sensor Patient : 1970   MRN: [id number]  Reason for Admission: SBO  Discharge Date: 10/26/20 RARS: Readmission Risk Score: 52      Last Discharge [facility]       Complaint Diagnosis Description Type Department Provider    10/22/20 Emesis; Abdominal Pain Small bowel obstruction ([facility] [address] . .. ED to Hosp-Admission (Discharged) (ADMITTED) Abelardo Valdivia MD               Facility: [facility]    Non-face-to-face services provided:  Obtained and reviewed discharge summary and/or continuity of care documents    Care Transitions 24 Hour Call    Do you have all of your prescriptions and are they filled?:  Yes  Care Transitions Interventions     Second attempt made to reach patient for initial post hospital transition call. VM left stating my contact information requesting a return call.             Follow Up  Future Appointments   Date Time Provider Timothy Beal   [facility]2020  [facility]   2021 10:00 AM MD KATIE Dumont ORTHO [facility]   3/19/2021 12:20 PM LILLIE Hull - CNP FF SLEEP MED [facility]       Baljinder Correa RN

## 2020-10-28 NOTE — ADT AUTH CERT
Utilization Reviews         Intestinal Obstruction - Care Day 4 (10/25/2020) by Lencho Agarwal RN         Review Status  Review Entered    Completed  10/28/2020 15:22        Criteria Review       Care Day: 4 Care Date: 10/25/2020 Level of Care: Inpatient Floor    Guideline Day 2    Level Of Care    (X) Floor    10/28/2020 3:22 PM EDT by Alona Lara on Med Surg unit    Clinical Status    (X) * Hypotension absent    10/28/2020 3:22 PM EDT by Nury Chow      106/66, 125/70, 101/55, 120/69    ( ) * Nausea and vomiting absent or improved    10/28/2020 3:22 PM EDT by Nury Chow      per Gen Surg note-Poor intake (doesn't want clears anymore).  He reports  nausea    ( ) * Pain absent or managed    10/28/2020 3:22 PM EDT by Nury Chwo      He complains of pain that is moderate.   (Seems irritated his IV pain medication has been taken away    PRn IV dilaudid 0.5mgx1  PO PRN Percocet 5-325 2 tabs x3    ( ) * Abdominal exam stable or improved    Activity    (X) Activity as tolerated    10/28/2020 3:22 PM EDT by Nury Chow      Up as tolerated    Routes    (X) IV fluids, medications    10/28/2020 3:22 PM EDT by Nury Chow      promethazine (PHENERGAN) 6.25 mg in sodium chloride 0.9% 50 mL IVPB    Dose: 6.25 mg  Freq: ONCE Route: IV    IVF NS 75/hr  IV Dilaudid 0.5mg x1  IV Zofran 4mg x1    (X) Diet as tolerated    10/28/2020 3:22 PM EDT by Nury Chow      low fiber    Interventions    (X) * NG tube absent    (X) Surgical re-evaluation    10/28/2020 3:22 PM EDT by Nury Chow      Gen Surg note-Given pain will repeat another CT with PO contrast this time to assess for obstruction     Additional recommendations pending CT results    (X) Possible follow-up abdominal imaging    10/28/2020 3:22 PM EDT by Nury Chow      CT ABD-Multiple loops of mildly prominent small bowel in the left hemiabdomen   demonstrate wall thickening and air-fluid levels.  These are similar   If able to eat can be discharged, i unable, repeat imaging in AM    * Milestone    Additional Notes    10/24       Max temp 99.2-45-18-98/54-95% room air       Gen Surg Note:  Subjective      Subjective:    Symptoms:  Stable.      Diet:  No nausea or vomiting (signficant emesis yesterday, now improved).      Activity level: Normal.      Review of Systems    Gastrointestinal: Negative for nausea and vomiting (signficant emesis yesterday, now improved). Assessment & Plan     Nausea and vomiting                Two weeks postop from lysis of adhesions at OSH                Feels better today but had emesis all day yesterday                Will try clears again                If unable to tolerate will need imaging with contrast       +++++++++++++++++++++++    Intermed Note:    Subjective: Patient seen and examined. Still with nausea and vomiting. Passing gas but no BM. Gen surg recommend advance diet as tolerated. Needs to follow up with surgeon who performed ex lap.  Will discontinue IV pain meds tomorrow and continue with oral meds    Assessment/Plan:         Small bowel obstruction    Likely secondary to multiple abdominal surgeries    Hold on NG tube unless continued nausea and vomiting    clears    IV fluids    General surgery consulted    +N/V, gas, no BM         Nausea and vomiting    Likely secondary to small bowel obstruction    Supportive care    IV fluids    resolving         Abdominal pain    Likely secondary to small bowel obstruction    Percocet as needed, added on IV dilaudid    Stop dilaudid tomorrow         Hyperlipidemia    Continue home statin         Acid reflux    Continue home PPI         Depression    Continue home Prozac         DVT Prophylaxis: lovenox    Diet: DIET CLEAR LIQUID;    Code Status: Full Code         PT/OT Eval Status: NA         Dispo - Inpt

## 2020-10-29 ENCOUNTER — CARE COORDINATION (OUTPATIENT)
Dept: CASE MANAGEMENT | Age: 50
End: 2020-10-29

## 2020-10-29 NOTE — CARE COORDINATION
Janay 45 Transitions Initial Follow Up Call    Call within 2 business days of discharge: Yes    Patient: Tk Granados Patient : 1970   MRN: [id number]  Reason for Admission: SBO  Discharge Date: 10/26/20 RARS: Readmission Risk Score: 52      Last Discharge [facility]       Complaint Diagnosis Description Type Department Provider    10/22/20 Emesis; Abdominal Pain Small bowel obstruction ([facility] [address] . .. ED to Hosp-Admission (Discharged) (ADMITTED) Smooth Braga, MD               Facility: [facility]    Non-face-to-face services provided:  Obtained and reviewed discharge summary and/or continuity of care documents    Care Transitions 24 Hour Call    Do you have all of your prescriptions and are they filled?:  Yes  Care Transitions Interventions       Unable to reach patient x 3 attempts. VM left advising patient that if they have any questions/concerns at anytime, they can always call PCP/specialist as they have an MD on call . No further CTN outreach will be made.         Follow Up  Future Appointments   Date Time Provider Timothy Beal   11/10/2020  2:00 PM Janay Griffiths [facility]   2021 10:00 AM Russ Carlton MD [facility]   3/19/2021 12:20 PM LILLIE Bolanos - CNP  SLEEP MED [facility]       Harish Verduzco RN

## 2020-11-10 ENCOUNTER — NURSE ONLY (OUTPATIENT)
Dept: FAMILY MEDICINE CLINIC | Age: 50
End: 2020-11-10
Payer: COMMERCIAL

## 2020-11-10 PROCEDURE — 90471 IMMUNIZATION ADMIN: CPT | Performed by: FAMILY MEDICINE

## 2020-11-10 PROCEDURE — 90686 IIV4 VACC NO PRSV 0.5 ML IM: CPT | Performed by: FAMILY MEDICINE

## 2020-11-12 ENCOUNTER — OFFICE VISIT (OUTPATIENT)
Dept: ORTHOPEDIC SURGERY | Age: 50
End: 2020-11-12
Payer: COMMERCIAL

## 2020-11-12 VITALS — TEMPERATURE: 97.5 F | HEIGHT: 72 IN | BODY MASS INDEX: 29.12 KG/M2 | WEIGHT: 215 LBS

## 2020-11-12 PROCEDURE — 99213 OFFICE O/P EST LOW 20 MIN: CPT | Performed by: ORTHOPAEDIC SURGERY

## 2020-11-12 NOTE — PROGRESS NOTES
This dictation was done with [employer]ER[employer] dictation and may contain mechanical errors related to translation. Temperature 97.5 °F (36.4 °C), height 6' (1.829 m), weight 215 lb (97.5 kg).

## 2020-11-15 NOTE — PROGRESS NOTES
Patient is a young 52-year-old male we follow for right knee arthroplasty. This patient's journey through knee arthroplasty began with another surgeon about 7 or 8 years ago where he underwent cemented total knee arthroplasty. When I saw this patient about 2 years ago he complains of pain and on physical examination he had instability both in medial lateral and anterior posterior plane. At that time his x-rays showed well fixed femoral and tibial implants. We offered him at that time exchange poly for a larger size the implant and hoping that this would eliminate his instability. We were successful with polyexchange and this did indeed eliminate the instability that the patient had preoperatively. However unfortunately secondary to this patient's possibly poor nutritional status and also that he smokes cigarettes he developed an infection of our revision. He then underwent a single-stage revision resection arthroplasty and now has been walking on this for several months. Date of the revision resection was 8/12/2020. Patient presents today complaining of pain in his knee and stating that he felt some type of pain in his knee which caused him to fall down and break his contralateral left wrist.  This is being treated by another physician later today with another appointment. Patient continues to be off antibiotics at this point in time. He is here for further evaluation and follow-up.     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  Chondromalacia of patellofemoral joint    Chronic pain syndrome    History of stroke    Gastroesophageal reflux disease with esophagitis--on daily ppi    Intractable chronic migraine without aura and with status migrainosus    Iron deficiency anemia    B12 deficiency    Anastomotic ulcer    Malignant neoplasm of left kidney (HCC) clear cell. 8/1/18 Dr Mady Kline.  Total knee replacement status, right    Failed total knee, right, [facility]    Infection of total knee replacement ([facility]    History of depression    History of alcoholism ([facility]    Receiving intravenous antibiotic treatment as outpatient    Small bowel obstruction ([facility]    Partial small bowel obstruction ([facility]    Infection of prosthetic right knee joint ([facility]     Prior to Visit Medications    Medication Sig Taking? Authorizing Provider   FLUoxetine (PROZAC) 20 MG capsule Take 1 capsule by mouth daily Yes Betzaida Anderson MD   traZODone (DESYREL) 100 MG tablet Take 2 tablets by mouth nightly Yes Betzaida Anderson MD   atorvastatin (LIPITOR) 40 MG tablet Take 1 tablet by mouth nightly At bedtime Yes Randalyn Gilford, MD   zolpidem (AMBIEN) 10 MG tablet Take 1 tablet by mouth nightly as needed for Sleep for up to 90 days.  Yes Tiffanie R Kehrt, APRN - CNP   dextrose 5 % SOLN 50 mL with ketamine 100 MG/ML SOLN 50 mg Infuse intravenously continuous Indications: 350 mg once every 3 months Yes Historical Provider, MD   acetaminophen (TYLENOL) 500 MG tablet Take 2 tablets by mouth 3 times daily (with meals)  Patient taking differently: Take 1,000 mg by mouth 3 times daily as needed  Yes Susanne Valdez PA-C   pantoprazole (PROTONIX) 40 MG tablet Take 1 tablet by mouth 2 times daily Yes Randalyn Gilford, MD   sildenafil (REVATIO) 20 MG tablet Take 4 tablets by mouth as needed (30 min prior to intercourse) Yes Randalyn Gilford, MD   calcium-vitamin D (OSCAL 500/200 D-3) 500-200 MG-UNIT per tablet Take 1 tablet by mouth 2 times daily  Yes Historical Provider, MD   Multiple Vitamin TABS Take 1 tablet by mouth daily  Yes Historical Provider, MD     Physical examination 71-year-old male oriented x3 temperature is 97.5. Examination of his back shows mild decreased range of motion but no specific pain tenderness or instability. Motor exam of the right lower extremity shows quadriceps hamstrings hip abductors and abductors foot plantar dorsiflexors are intact 4-5 over 5. Sensation and perfusion are intact to the right foot and ankle. There is no numbness or tingling noted in the right lower extremity. Examination of the right knee shows a well-healed anterior scar full extension 130 degrees of flexion with excellent overall stability considering his resection 1 stage arthroplasty. I see no obvious signs of deep sepsis in the right knee. X-rays obtained AP lateral patellofemoral view of the right knee show status post single-stage resection arthroplasty for deep periprosthetic infection. The overall alignment AP and lateral shows well positioned implants. Patellofemoral mechanics look stable. There is no signs of loosening or failure. No other obvious fractures tumors or dislocations are noted on these x-rays. Impression 71-year-old male status post resection 1 stage arthroplasty for deep periprosthetic infection of the revision total knee arthroplasty. At this point time I do not recommend any surgical maneuvers to his right knee as it appears to be at least stable radiographically and on physical examination. The fact that this patient complains of pain in his knee will not change with any surgical procedure at this time. There are no signs of infection and I think that this patient is very young age he should go as long as possible before revision  of this single-stage resection arthroplasty until either loosening or possibly new infection become part of his clinical picture.   I highly recommend against revision solely for pain. If the patient feels like the knee is unstable that I would brace his knee before I would operate on his knee. Plan we had a 15-minute face-to-face discussion with this patient of which greater than 50% of time was spent on counseling him about further care and treatment of his with right knee arthroplasty. Please see above my recommendations. We will see him back in a every 6 to 8 months schedule. Follow-up at that time.

## 2020-12-03 RX ORDER — FLUOXETINE HYDROCHLORIDE 20 MG/1
20 CAPSULE ORAL DAILY
Qty: 90 CAPSULE | Refills: 1 | Status: SHIPPED | OUTPATIENT
Start: 2020-12-03 | End: 2021-06-25

## 2021-01-11 RX ORDER — SILDENAFIL CITRATE 20 MG/1
80 TABLET ORAL PRN
Qty: 30 TABLET | Refills: 0 | Status: SHIPPED | OUTPATIENT
Start: 2021-01-11

## 2021-01-14 ENCOUNTER — OFFICE VISIT (OUTPATIENT)
Dept: ORTHOPEDIC SURGERY | Age: 51
End: 2021-01-14
Payer: COMMERCIAL

## 2021-01-14 ENCOUNTER — PREP FOR PROCEDURE (OUTPATIENT)
Dept: ORTHOPEDIC SURGERY | Age: 51
End: 2021-01-14

## 2021-01-14 VITALS — WEIGHT: 215 LBS | HEIGHT: 72 IN | TEMPERATURE: 97.2 F | BODY MASS INDEX: 29.12 KG/M2

## 2021-01-14 DIAGNOSIS — T84.50XD PROSTHETIC JOINT INFECTION, SUBSEQUENT ENCOUNTER: Primary | ICD-10-CM

## 2021-01-14 PROCEDURE — L1812 KO ELASTIC W/JOINTS PRE OTS: HCPCS | Performed by: ORTHOPAEDIC SURGERY

## 2021-01-14 PROCEDURE — 99214 OFFICE O/P EST MOD 30 MIN: CPT | Performed by: ORTHOPAEDIC SURGERY

## 2021-01-14 NOTE — PROGRESS NOTES
[facility]  Office Visit    Chief Complaint: Postop follow-up right total knee arthroplasty infection with removal of prosthesis and placement of spacer    HPI:  Samuel Dunn is a 53 y.o. who is here for follow-up of his right knee. He underwent removal of his knee prosthesis with placement of an articulating spacer in August 2020. Cultures in January 2020 grew Corynebacterium jeikeium. He completed his IV antibiotics. He is here in the office today alone and is ambulating with use of a cane. He has been wearing a knee brace. He continues to report issues with feelings of knee instability. He has a lot of trouble on steps and getting up out of a chair. He denies any swelling or redness in the knee. He denies diabetes or heart or lung problems. He does have a history of a mild stroke. He does have sleep apnea. He does not use nicotine. He takes a baby aspirin once a day. 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  Chondromalacia of patellofemoral joint    Chronic pain syndrome    History of stroke    Gastroesophageal reflux disease with esophagitis--on daily ppi    Intractable chronic migraine without aura and with status migrainosus    Iron deficiency anemia    B12 deficiency    Anastomotic ulcer    Malignant neoplasm of left kidney (HCC) clear cell. 8/1/18 Dr Jaleesa Oneil.     Total knee replacement status, right  Failed total knee, right, [facility] encounter ([facility]    Infection of total knee replacement ([facility]    History of depression    [facility]    Receiving intravenous antibiotic treatment as outpatient    [facility] obstruction [facility]    Partial small bowel obstruction ([facility]    Infection of prosthetic right knee joint ([facility]       ROS:  Constitutional: denies fever, chills, weight loss  MSK: denies pain in other joints, muscle aches  Neurological: denies numbness, tingling, weakness    Exam:  Temperature 97.2 °F (36.2 °C), temperature source Infrared, height 6' (1.829 m), weight 215 lb (97.5 kg). Appearance: sitting in exam room chair, appears to be in no acute distress, awake and alert  Resp: unlabored breathing on room air  Skin: warm, dry and intact with out erythema or significant increased temperature  Neuro: grossly intact both lower extremities. Intact sensation to light touch. Motor exam 4+ to 5/5 in all major motor groups. MSK: Right knee: Examination demonstrates no gross deformity. Incision is healed. Range of motion 5 to 120 degrees. The knee is stable to varus and valgus stress in full extension. At 90 degrees of flexion, he has gross instability in the anterior direction. Sensation is intact light touch. There is brisk capillary refill. Dorsalis pedis and posterior tibial pulses are intact. There is 5/5 muscle strength in all muscle groups. Imaging:  Prior right knee radiographs were reviewed and are significant for an all polyethylene tibial component cemented in the place and the femoral component cemented in place with a small area of cement debonding of the femoral component.     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 He continues to have issues with instability of his temporary spacer. We discussed revision right total knee arthroplasty. The operative procedure, alternatives, and risks were discussed in detail with the patient. The risks include but are not limited to: Infection, vessel injury, nerve injury, DVT, pulmonary embolism, implant loosening, need for revision surgery, loss of motion, continued pain. Despite these risks the patient would like to proceed. All questions have been answered and no guarantees have been made. The patient is unable to do further physical therapy due to disabling pain. I discussed with the patient the diagnosis in detail and answered all the questions. The patient verbalized understanding of the plan as it has been described above and is in agreement. Plan for revision right total knee arthroplasty with explant of current components. Plan to use a Johnny with femoral and tibial cones. He requested a new brace today and this was provided. Total time spent on today's encounter was between 30-39 minutes. This time included reviewing prior notes, radiographs, and lab results when available, reviewing history obtained by medical assistant, performing history and physical exam, reviewing tests/radiographs with the patient, counseling the patient, ordering medications or tests, documentation in the electronic health record, and coordination of care. Procedures    B[employer] Economy Hinged Knee WrapAround Brace     Patient was prescribed a [employer] Hinged Wrap Around Knee Brace. The right knee will require stabilization / immobilization from this semi-rigid / rigid orthosis to improve their function. The orthosis will assist in protecting the affected area, provide functional support and facilitate healing. The patient was educated and fit by a healthcare professional with expert knowledge and specialization in brace application while under the direct supervision of the treating physician. Verbal and written instructions for the use of and application of this item were provided. They were instructed to contact the office immediately should the brace result in increased pain, decreased sensation, increased swelling or worsening of the condition. Scheduling Instructions:      Patient to pay cash     This dictation was done with Dragon dictation and may contain mechanical errors related to translation.

## 2021-01-14 NOTE — LETTER
[address] [facility]  Surgery Scheduling Form:  [facility]    DEMOGRAPHICS:                                                                                                                Patient Name:  Samuel Dunn  Patient :  1970   Patient SS#:      Patient Phone:  [phone] (home)                            Patient Address:  [address] [place] [address]    PCP:  Vinnie Riggins MD  Insurance:   Payor/Plan Subscr  Sex Relation Sub. Ins. ID Effective Group Num   1.  [id number] -* YOHANNES SALINAS 3/14/1974 Female Spouse [id number]                                   [id number]     DIAGNOSIS & PROCEDURE:                                                                                              Diagnosis:     Right total knee infection    T84.50CD  Operation:  Right removal of explant and Total Knee Replacement   [id number]  / [id number]  Location:  [place] [address]  Surgeon:  Melissa Mccoy MD    SCHEDULING INFORMATION:                                                                                         .  Surgeon's Scheduling Instruction:  elective    Requested Date:    OR Time:  7:30 Patient Arrival Time:  6:00    OR TIME REQUIRED  :  2.25 hours  Anesthesia:  Choice  Equipment: [employer]                          COVID:    2-10  Mini C-Arm:  No   Standard C-Arm:  No  Status:  outpatient  PAT Required:  Yes  Comments: NO femoral nerve block   ALLERGIES:Nsaids, Morphine, Prochlorperazine, Valtrex [valacyclovir hcl], Morphine and related, and Tolmetin                    Pasquale Bro MD      21 10:12 AM  BILLING INFORMATION:                                                                                                     Procedure:       CPT Code Modifier    With Ina Escobar, [address]          H&P AT:       PCP  REBECCA Castaneda      1970                                          Right removal of explant and Total Knee Replacement            PHYSICIANS ORDERD  HEIGHT:   6'          WEIGHT:  215      ALLERGIES:Nsaids, Morphine, Prochlorperazine, Valtrex [valacyclovir hcl], Morphine and related, and Tolmetin     SURG  2-16                JET      ALREADY                                                                                                                                   HAD                    _________________________________________________________________  PRE-OP ORDERS:  ? CBC WITH DIFFERENTIAL                                                ? TYPE AND SCREEN                                                            ? HgB A1C                                                                               ? EKG                                                                                        ? NASAL CULUTRE MRSA  ? UAR/if positive repeat UAR on admission  ? BMP           ALBUMIN AND PREALBUMIN           VITAMIN D LEVELS  ? COAG PROFILE  ? SED RATE  ? PT/OT EVAL AND TEACHING  ? INSTRUCT PT TO STOP ALL NSAIDS, ASPIRIN, BLOOD THINNERS 7 DAYS PRIOR SURGERY  DAY OF SURGERY  ? CEFAZOLIN 2 GM IVPB; IF PATIENT WEIGHS > 80 KG AND SERUM CREATININE <2.5 mg/dl, GIVE 2 GM DOSE WITHIN 1 HOUR OF INCISION. ? IF THE PRE-OP NASAL CULTURE FOR MRSA WAS POSITIVE:   REPEAT NASAL SWAB ON ADMISSION AND ADMINISTER VANCOMYCIN 15 mg x kg, REDUCE THE DOSE OF VANCOMYCIN  MG IVPB IF PT < 55 KG OR SERUM CREATININE > 2mg/dl; also to get Cefazolin 2 GM or wt based  ? All patients will receive preop Cefazolin 2 GM or wt based   ? APPLY KNEE HIGH ANTI-EMBOLIC AND PNEUMO-BOOTS TO UNOPERATIVE  LEG  ? CELEBREX  200 MG  ORALLY  DAY OF SURGERY  ?  ROXICODONE 10MG  ORALLY DAY OF SURGERY  OTHER ORDERS:_______________________________________________________PHYSICIAN SIGNATURE: __   1/14/21                                                                                                       10:12 AM

## 2021-01-20 ENCOUNTER — TELEPHONE (OUTPATIENT)
Dept: ORTHOPEDIC SURGERY | Age: 51
End: 2021-01-20

## 2021-01-20 NOTE — TELEPHONE ENCOUNTER
CPT: [id number], [id number]  BODY PART: right knee  AUTHORIZATION: approved     Per Vinnie with [employer] @ [phone] approved [id number]

## 2021-01-26 ENCOUNTER — OFFICE VISIT (OUTPATIENT)
Dept: FAMILY MEDICINE CLINIC | Age: 51
End: 2021-01-26
Payer: COMMERCIAL

## 2021-01-26 VITALS
TEMPERATURE: 97.1 F | BODY MASS INDEX: 31.59 KG/M2 | HEART RATE: 61 BPM | SYSTOLIC BLOOD PRESSURE: 120 MMHG | WEIGHT: 233.2 LBS | OXYGEN SATURATION: 96 % | HEIGHT: 72 IN | DIASTOLIC BLOOD PRESSURE: 74 MMHG | RESPIRATION RATE: 14 BRPM

## 2021-01-26 DIAGNOSIS — Z72.0 TOBACCO USE: ICD-10-CM

## 2021-01-26 DIAGNOSIS — Z90.81 HISTORY OF SPLENECTOMY: ICD-10-CM

## 2021-01-26 DIAGNOSIS — K22.70 BARRETT'S ESOPHAGUS WITHOUT DYSPLASIA: Primary | ICD-10-CM

## 2021-01-26 DIAGNOSIS — Z86.73 HISTORY OF STROKE: ICD-10-CM

## 2021-01-26 DIAGNOSIS — D50.9 IRON DEFICIENCY ANEMIA, UNSPECIFIED IRON DEFICIENCY ANEMIA TYPE: ICD-10-CM

## 2021-01-26 DIAGNOSIS — Z11.59 ENCOUNTER FOR HEPATITIS C SCREENING TEST FOR LOW RISK PATIENT: ICD-10-CM

## 2021-01-26 PROBLEM — K28.9 ANASTOMOTIC ULCER: Status: RESOLVED | Noted: 2018-04-02 | Resolved: 2021-01-26

## 2021-01-26 PROBLEM — K56.609 SMALL BOWEL OBSTRUCTION (HCC): Status: RESOLVED | Noted: 2020-04-03 | Resolved: 2021-01-26

## 2021-01-26 PROBLEM — Z96.651 TOTAL KNEE REPLACEMENT STATUS, RIGHT: Status: RESOLVED | Noted: 2019-09-11 | Resolved: 2021-01-26

## 2021-01-26 PROBLEM — K56.600 PARTIAL SMALL BOWEL OBSTRUCTION (HCC): Status: RESOLVED | Noted: 2020-04-03 | Resolved: 2021-01-26

## 2021-01-26 PROCEDURE — 99214 OFFICE O/P EST MOD 30 MIN: CPT | Performed by: FAMILY MEDICINE

## 2021-01-26 NOTE — PROGRESS NOTES
2021    Blood pressure 120/74, pulse 61, temperature 97.1 °F (36.2 °C), temperature source Temporal, resp. rate 14, height 6' (1.829 m), weight 233 lb 3.2 oz (105.8 kg), SpO2 96 %. Juan Luis Blanca (:  1970) is a 53 y.o. male, here for evaluation of the following medical concerns:    Chief Complaint   Patient presents with   Rock Samples Other     still having abdominal pian from bowel obstruction 10/30/2020. was admitted and released 2020. Planning completion of knee replacement soon, the temporary knee spacer is not stable. Had infection after revision and several infections before that. Knee is closed currently. No longer on abx. Dr Michele Cotto planning this additional revision. He has not been able to get ketamine infusions for chronic pain. This helps his depression also. So mood has not been great and pain is worse. Psych is dr Nikky Hudson still. On fluox and traz for sleep. No thoughts of self harm. He is smoking 4 cigarettes a day again. Has used vapor nicotine in past and currently some of this without nicotine as a substitute. The physical aspects of smoking are appealing to him. Not sure if he can transition to a non-vaping nicotine device. abd pain is up and down. Wishes he could get the ketamine, which is always helpful for this. No nausea, gi bleeding. His last weight was down abnormally due to his bowel obstruction. He is now eating more normally and has returned to prior weight. Weight was 260's a year ago. Continues on PPI BID due to Vaughn's. Last EGD was at [facility] 10/20 during his hospitaliztion for for SBO. (he was admitted from endoscopy, actually, after having the EGD). His GI is Dr Norbert Hancock. 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 No sign of recurrence.   Last renal function test:  Normal.  Lab Results   Component Value Date     10/23/2020    K 3.7 10/23/2020    BUN 7 10/23/2020    CREATININE 0.8 10/23/2020 Takes atorva for primary risk reduction. Not fasting today. Lab Results   Component Value Date    CHOL 120 03/26/2019    TRIG 122 03/26/2019    HDL 47 03/26/2019    LDLCALC 49 03/26/2019     Lab Results   Component Value Date    ALT 7 (L) 10/23/2020    AST 10 (L) 10/23/2020        No new concerns. He is going to get men B at [facility] on his way home- his 3rd and final dose. 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  Chondromalacia of patellofemoral joint    Chronic pain syndrome    History of stroke    Gastroesophageal reflux disease with esophagitis--on daily ppi    Intractable chronic migraine without aura and with status migrainosus    Iron deficiency anemia    B12 deficiency    Anastomotic ulcer    Malignant neoplasm of left kidney (HCC) clear cell. 8/1/18 Dr Tino Driver.  Total knee replacement status, right    Failed total knee, right, initial encounter ([address]    Infection of total knee replacement ([address]    History of depression    History of alcoholism ([address]    Receiving intravenous antibiotic treatment as outpatient    Small bowel obstruction ([address]    Partial small bowel obstruction ([address]    Infection of prosthetic right knee joint ([address]        Body mass index is 31.63 kg/m².     Wt Readings from Last 3 Encounters:   01/26/21 233 lb 3.2 oz (105.8 kg)   01/14/21 215 lb (97.5 kg)   11/12/20 215 lb (97.5 kg)       BP Readings from Last 3 Encounters:   01/26/21 120/74 10/26/20 113/72   10/11/20 119/67       Allergies   Allergen Reactions    Nsaids Nausea Only and Other (See Comments)     Hx of Barretts esophagus      Morphine Hives and Itching     Pt gets Hives/itching. Does not tolerate     Prochlorperazine Other (See Comments)     No allergic reaction, patient reported sense of \"restlessness\" and fidgiting    Valtrex [Valacyclovir Hcl] Diarrhea    Morphine And Related Itching    Tolmetin Nausea Only     Hx of Barretts esophagus       Prior to Visit Medications    Medication Sig Taking? Authorizing Provider   sildenafil (REVATIO) 20 MG tablet Take 4 tablets by mouth as needed (30 min prior to intercourse) Yes Maggy Morales MD   FLUoxetine (PROZAC) 20 MG capsule TAKE 1 CAPSULE BY MOUTH DAILY Yes Maggy Morales MD   traZODone (DESYREL) 100 MG tablet Take 2 tablets by mouth nightly Yes Anival Grayson MD   atorvastatin (LIPITOR) 40 MG tablet Take 1 tablet by mouth nightly At bedtime Yes Maggy Morales MD   dextrose 5 % SOLN 50 mL with ketamine 100 MG/ML SOLN 50 mg Infuse intravenously continuous Indications: 350 mg once every 3 months Yes Historical Provider, MD   acetaminophen (TYLENOL) 500 MG tablet Take 2 tablets by mouth 3 times daily (with meals)  Patient taking differently: Take 1,000 mg by mouth 3 times daily as needed  Yes Abi Reddy PA-C   pantoprazole (PROTONIX) 40 MG tablet Take 1 tablet by mouth 2 times daily Yes Maggy Morales MD   calcium-vitamin D (OSCAL 500/200 D-3) 500-200 MG-UNIT per tablet Take 1 tablet by mouth 2 times daily  Yes Historical Provider, MD   Multiple Vitamin TABS Take 1 tablet by mouth daily  Yes Historical Provider, MD        Social History     Tobacco Use    Smoking status: Former Smoker     Packs/day: 0.50     Years: 25.00     Pack years: 12.50     Types: Cigarettes     Quit date: 7/31/2017     Years since quitting: 3.4    Smokeless tobacco: Never Used   Substance Use Topics    Alcohol use:  No Alcohol/week: 0.0 standard drinks     Comment: last drink 2015    Drug use: No       Review of Systems No chest pains, dizziness, heart palpitations, dyspnea, lightheadedness, worsening edema. Physical Exam  Vitals signs and nursing note reviewed. Constitutional:       General: He is not in acute distress. Appearance: Normal appearance. He is well-developed. He is not diaphoretic. HENT:      Head: Normocephalic and atraumatic. Right Ear: Tympanic membrane, ear canal and external ear normal.      Left Ear: Tympanic membrane, ear canal and external ear normal.      Nose: Nose normal. No congestion or rhinorrhea. Mouth/Throat:      Mouth: Mucous membranes are moist.      Pharynx: Oropharynx is clear. No oropharyngeal exudate or posterior oropharyngeal erythema. Eyes:      General: No scleral icterus. Right eye: No discharge. Left eye: No discharge. Conjunctiva/sclera: Conjunctivae normal.      Pupils: Pupils are equal, round, and reactive to light. Neck:      Musculoskeletal: Normal range of motion and neck supple. Cardiovascular:      Rate and Rhythm: Normal rate and regular rhythm. Heart sounds: Normal heart sounds. No murmur. Pulmonary:      Effort: Pulmonary effort is normal. No respiratory distress. Breath sounds: Normal breath sounds. No wheezing or rales. Abdominal:      General: Bowel sounds are normal. There is no distension. Palpations: Abdomen is soft. There is no mass. Tenderness: There is no abdominal tenderness. There is no guarding or rebound. Hernia: No hernia is present. Musculoskeletal:         General: No swelling. Right lower leg: No edema. Left lower leg: No edema. Lymphadenopathy:      Cervical: No cervical adenopathy. Skin:     General: Skin is warm and dry. Capillary Refill: Capillary refill takes less than 2 seconds. Neurological:      General: No focal deficit present.

## 2021-01-26 NOTE — Clinical Note
Hi! I told Amina that this visit could serve as his preop. I included a statement about his medical clearance for surgery. I doubt he will fully stop smoking prior to surgery, but who knows. Thanks.

## 2021-01-28 ENCOUNTER — PREP FOR PROCEDURE (OUTPATIENT)
Dept: ORTHOPEDICS UNIT | Age: 51
End: 2021-01-28

## 2021-01-29 RX ORDER — OXYCODONE HYDROCHLORIDE 10 MG/1
10 TABLET ORAL ONCE
Status: CANCELLED | OUTPATIENT
Start: 2021-01-29 | End: 2021-01-29

## 2021-02-01 NOTE — DISCHARGE INSTR - ACTIVITY
Activity:  ? Elevate your leg if swelling occurs in your ankle. Use elastic wraps/hose until swelling decreases. ? Continue the exercise program as prescribed by physical therapists. ? Take frequent walks. ? Use walker, crutches, or cane with weight bearing instructions as indicated by the physical therapists. ? Take rest periods often. Elevate leg during rest period.

## 2021-02-02 DIAGNOSIS — D50.9 IRON DEFICIENCY ANEMIA, UNSPECIFIED IRON DEFICIENCY ANEMIA TYPE: ICD-10-CM

## 2021-02-02 DIAGNOSIS — Z86.73 HISTORY OF STROKE: ICD-10-CM

## 2021-02-02 DIAGNOSIS — Z11.59 ENCOUNTER FOR HEPATITIS C SCREENING TEST FOR LOW RISK PATIENT: ICD-10-CM

## 2021-02-02 LAB
A/G RATIO: 1.9 (ref 1.1–2.2)
ALBUMIN SERPL-MCNC: 4.2 G/DL (ref 3.4–5)
ALP BLD-CCNC: 87 U/L (ref 40–129)
ALT SERPL-CCNC: 13 U/L (ref 10–40)
ANION GAP SERPL CALCULATED.3IONS-SCNC: 8 MMOL/L (ref 3–16)
AST SERPL-CCNC: 14 U/L (ref 15–37)
BILIRUB SERPL-MCNC: 0.3 MG/DL (ref 0–1)
BUN BLDV-MCNC: 11 MG/DL (ref 7–20)
CALCIUM SERPL-MCNC: 9.7 MG/DL (ref 8.3–10.6)
CHLORIDE BLD-SCNC: 105 MMOL/L (ref 99–110)
CHOLESTEROL, TOTAL: 106 MG/DL (ref 0–199)
CO2: 29 MMOL/L (ref 21–32)
CREAT SERPL-MCNC: 1.3 MG/DL (ref 0.9–1.3)
GFR AFRICAN AMERICAN: >60
GFR NON-AFRICAN AMERICAN: 58
GLOBULIN: 2.2 G/DL
GLUCOSE BLD-MCNC: 77 MG/DL (ref 70–99)
HCT VFR BLD CALC: 38.1 % (ref 40.5–52.5)
HDLC SERPL-MCNC: 52 MG/DL (ref 40–60)
HEMOGLOBIN: 12.1 G/DL (ref 13.5–17.5)
HEPATITIS C ANTIBODY INTERPRETATION: NORMAL
LDL CHOLESTEROL CALCULATED: 33 MG/DL
MCH RBC QN AUTO: 27.2 PG (ref 26–34)
MCHC RBC AUTO-ENTMCNC: 31.8 G/DL (ref 31–36)
MCV RBC AUTO: 85.4 FL (ref 80–100)
PDW BLD-RTO: 17.4 % (ref 12.4–15.4)
PLATELET # BLD: 264 K/UL (ref 135–450)
PMV BLD AUTO: 9.8 FL (ref 5–10.5)
POTASSIUM SERPL-SCNC: 4.6 MMOL/L (ref 3.5–5.1)
RBC # BLD: 4.47 M/UL (ref 4.2–5.9)
SODIUM BLD-SCNC: 142 MMOL/L (ref 136–145)
TOTAL PROTEIN: 6.4 G/DL (ref 6.4–8.2)
TRIGL SERPL-MCNC: 103 MG/DL (ref 0–150)
VLDLC SERPL CALC-MCNC: 21 MG/DL
WBC # BLD: 6.1 K/UL (ref 4–11)

## 2021-02-09 DIAGNOSIS — T84.50XD PROSTHETIC JOINT INFECTION, SUBSEQUENT ENCOUNTER: ICD-10-CM

## 2021-02-09 LAB
ABO/RH: NORMAL
ALBUMIN SERPL-MCNC: 4.3 G/DL (ref 3.4–5)
ANION GAP SERPL CALCULATED.3IONS-SCNC: 9 MMOL/L (ref 3–16)
ANTIBODY SCREEN: NORMAL
APTT: 37.6 SEC (ref 24.2–36.2)
BASOPHILS ABSOLUTE: 0.1 K/UL (ref 0–0.2)
BASOPHILS RELATIVE PERCENT: 2.1 %
BILIRUBIN URINE: ABNORMAL
BLOOD, URINE: NEGATIVE
BUN BLDV-MCNC: 6 MG/DL (ref 7–20)
C-REACTIVE PROTEIN: <3 MG/L (ref 0–5.1)
CALCIUM SERPL-MCNC: 9.3 MG/DL (ref 8.3–10.6)
CHLORIDE BLD-SCNC: 108 MMOL/L (ref 99–110)
CLARITY: ABNORMAL
CO2: 27 MMOL/L (ref 21–32)
COARSE CASTS, UA: ABNORMAL /LPF (ref 0–2)
COLOR: ABNORMAL
COMMENT UA: ABNORMAL
CREAT SERPL-MCNC: 1.2 MG/DL (ref 0.9–1.3)
EOSINOPHILS ABSOLUTE: 0.3 K/UL (ref 0–0.6)
EOSINOPHILS RELATIVE PERCENT: 5.8 %
EPITHELIAL CELLS, UA: 0 /HPF (ref 0–5)
GFR AFRICAN AMERICAN: >60
GFR NON-AFRICAN AMERICAN: >60
GLUCOSE BLD-MCNC: 69 MG/DL (ref 70–99)
GLUCOSE URINE: NEGATIVE MG/DL
HCT VFR BLD CALC: 36.4 % (ref 40.5–52.5)
HEMOGLOBIN: 11.7 G/DL (ref 13.5–17.5)
HYALINE CASTS: 1 /LPF (ref 0–8)
INR BLD: 1.15 (ref 0.86–1.14)
KETONES, URINE: ABNORMAL MG/DL
LEUKOCYTE ESTERASE, URINE: ABNORMAL
LYMPHOCYTES ABSOLUTE: 1.6 K/UL (ref 1–5.1)
LYMPHOCYTES RELATIVE PERCENT: 31.9 %
MCH RBC QN AUTO: 27.7 PG (ref 26–34)
MCHC RBC AUTO-ENTMCNC: 32 G/DL (ref 31–36)
MCV RBC AUTO: 86.5 FL (ref 80–100)
MICROSCOPIC EXAMINATION: YES
MONOCYTES ABSOLUTE: 0.4 K/UL (ref 0–1.3)
MONOCYTES RELATIVE PERCENT: 8.5 %
NEUTROPHILS ABSOLUTE: 2.5 K/UL (ref 1.7–7.7)
NEUTROPHILS RELATIVE PERCENT: 51.7 %
NITRITE, URINE: NEGATIVE
PDW BLD-RTO: 17.3 % (ref 12.4–15.4)
PH UA: 5.5 (ref 5–8)
PLATELET # BLD: 268 K/UL (ref 135–450)
PMV BLD AUTO: 10 FL (ref 5–10.5)
POTASSIUM SERPL-SCNC: 4.1 MMOL/L (ref 3.5–5.1)
PREALBUMIN: 19.9 MG/DL (ref 20–40)
PROTEIN UA: NEGATIVE MG/DL
PROTHROMBIN TIME: 13.4 SEC (ref 10–13.2)
RBC # BLD: 4.21 M/UL (ref 4.2–5.9)
RBC UA: 5 /HPF (ref 0–4)
SEDIMENTATION RATE, ERYTHROCYTE: 2 MM/HR (ref 0–15)
SODIUM BLD-SCNC: 144 MMOL/L (ref 136–145)
SPECIFIC GRAVITY UA: 1.02 (ref 1–1.03)
URINE REFLEX TO CULTURE: ABNORMAL
URINE TYPE: ABNORMAL
UROBILINOGEN, URINE: 1 E.U./DL
VITAMIN D 25-HYDROXY: 51.8 NG/ML
WBC # BLD: 4.9 K/UL (ref 4–11)
WBC UA: 3 /HPF (ref 0–5)

## 2021-02-10 LAB
ESTIMATED AVERAGE GLUCOSE: 99.7 MG/DL
HBA1C MFR BLD: 5.1 %
URINE CULTURE, ROUTINE: NORMAL

## 2021-02-10 RX ORDER — ASPIRIN 81 MG/1
81 TABLET ORAL DAILY
Status: ON HOLD | COMMUNITY
End: 2021-02-16 | Stop reason: SDUPTHER

## 2021-02-10 NOTE — PROGRESS NOTES
MRSA AND EKG NOT DONE FOR PRE-OP DUE TO PATIENT HAD LABS DONE AT OUTPATIENT LAB IN [facility]-PATIENT COMING IN TOMORROW-2/11 TO [facility] TO HAVE EKG AND MRSA SWAB DONE IN DIAGNOSTICS-DR. JUAREZ'S OFFICE MADE AWARE

## 2021-02-10 NOTE — PROGRESS NOTES
4.  Shower the week before surgery with an antibacterial soap, such as                       dial, safeguard, etc.                         5.  Three(3) days prior to your surgery, cleanse the operative site with    2/13,2/14,2/15                      Hibiclens(anti-microbial soap). This soap may dry your skin, please                          do not apply any oils or lotions     · Please bring your insurance card and picture ID day of surgery    · If you have a living will or durable power of attorny. Please bring in a copy of your advanced directives. · If you have dentures, they will be removed before going to the OR, we will provide you with a container. If you wear contact lenses or glasses, they will be removes, please bring a case for them. · Have you seen your family doctor for a pre-op history and physical.      · Surgery scheduler will call you 48 hours prior to your surgery to notify you of the time of your surgery and the time you will need to be at hospital...patients are asked to arrive 21/2 hours prior to surgery. ·  Please call Pre-Admission testing if you have any further questions. [id number] [facility] testing phone number:  [phone]      Thank You for choosing [facility]!!    Remember. [place] Safety First! Call before you Fall Remember

## 2021-02-10 NOTE — PROGRESS NOTES
C-Difficile admission screening and protocol:     * Admitted with diarrhea? NO   *Prior history of C-Diff. In last 3 months? NO     *Antibiotic use in the past 6-8 weeks? NO     *Prior hospitalization or nursing home in the last month?     NO

## 2021-02-10 NOTE — PROGRESS NOTES
Preoperative Screening for Elective Surgery/Invasive Procedures While COVID-19 present in the community    ? Have you tested positive or have been told to self-isolate for COVID-19 like symptoms within the past 28 days? NO  ? Do you currently have any of the following symptoms? NO  o Fever >100.0 F or 99.9 F in immunocompromised patients? NO  o New onset cough, shortness of breath or difficulty breathing? NO  o New onset sore throat, myalgia (muscle aches and pains), headache, loss of taste/smell or diarrhea? NO  ? Have you had a potential exposure to COVID-19 within the past 14 days by: NO  o Close contact with a confirmed case? NO  o Close contact with a healthcare worker,  or essential infrastructure worker (grocery store, [employer], gas station, public utilities or transportation)? NO  o Do you reside in a congregate setting such as; skilled nursing facility, adult home, correctional facility, homeless shelter or other institutional setting? NO  o Have you had recent travel to a known COVID-19 hotspot? NO    Indicate if the patient has a positive screen by answering yes to one or more of the above questions. Patients who test positive or screen positive prior to surgery or on the day of surgery should be evaluated in conjunction with the surgeon/proceduralist/anesthesiologist to determine the urgency of the procedure.

## 2021-02-11 ENCOUNTER — OFFICE VISIT (OUTPATIENT)
Dept: ORTHOPEDIC SURGERY | Age: 51
End: 2021-02-11
Payer: COMMERCIAL

## 2021-02-11 ENCOUNTER — HOSPITAL ENCOUNTER (OUTPATIENT)
Age: 51
Discharge: HOME | End: 2021-02-11
Payer: COMMERCIAL

## 2021-02-11 VITALS — SYSTOLIC BLOOD PRESSURE: 107 MMHG | DIASTOLIC BLOOD PRESSURE: 63 MMHG | HEART RATE: 53 BPM | TEMPERATURE: 98.2 F

## 2021-02-11 DIAGNOSIS — T84.50XD PROSTHETIC JOINT INFECTION, SUBSEQUENT ENCOUNTER: ICD-10-CM

## 2021-02-11 DIAGNOSIS — T84.50XD PROSTHETIC JOINT INFECTION, SUBSEQUENT ENCOUNTER: Primary | ICD-10-CM

## 2021-02-11 LAB
EKG ATRIAL RATE: 93 BPM
EKG DIAGNOSIS: NORMAL
EKG P AXIS: 8 DEGREES
EKG P-R INTERVAL: 148 MS
EKG Q-T INTERVAL: 422 MS
EKG QRS DURATION: 86 MS
EKG QTC CALCULATION (BAZETT): 373 MS
EKG R AXIS: 60 DEGREES
EKG T AXIS: 55 DEGREES
EKG VENTRICULAR RATE: 47 BPM

## 2021-02-11 PROCEDURE — 93005 ELECTROCARDIOGRAM TRACING: CPT

## 2021-02-11 PROCEDURE — 93010 ELECTROCARDIOGRAM REPORT: CPT | Performed by: INTERNAL MEDICINE

## 2021-02-11 PROCEDURE — 99214 OFFICE O/P EST MOD 30 MIN: CPT | Performed by: ORTHOPAEDIC SURGERY

## 2021-02-11 PROCEDURE — 87081 CULTURE SCREEN ONLY: CPT

## 2021-02-11 NOTE — DISCHARGE INSTR - COC
[facility]) Continuity of Care Form    Patient Name:  Randall Shipman  : 1970    MRN:  [id number]    Admit date:  (Not on file)  Discharge date:  2021    Code Status Order: Prior  Advance Directives: Yes    Admitting Physician: Lb Mondragon MD  PCP: Gio Denis MD    Discharging Nurse: [facility] RESPONSE [place] Unit/Room#: No information available for this encounter. Discharging Unit Phone Number: [phone]    Emergency Contact:        Past Surgical History:  Past Surgical History:   Procedure Laterality Date    ABDOMEN SURGERY      gastric bypass    ABDOMEN SURGERY  2016    repair of recurrent incisional hernia with mesh, removal of old mesh, bilateral component separation    ABDOMINAL EXPLORATION SURGERY  16    exp lap, lysis of adhesions, small bowel resection    CARPAL TUNNEL RELEASE      bilat    DILATATION, ESOPHAGUS      ENDOSCOPY, COLON, DIAGNOSTIC      EYE SURGERY      GASTRIC BYPASS SURGERY  2009    Has lost about 200 pounds.  HERNIA REPAIR  3-    ventral    JOINT REPLACEMENT      KIDNEY REMOVAL Left 2018    [facility]     KNEE ARTHROSCOPY Right 12    RIGHT KNEE ARTHROSCOPY WITH CHONDROPLASTY    KNEE SURGERY  2012    right, arthroscopy    KNEE SURGERY Right 2020    INCISION AND DRAINAGE RIGHT KNEE AND WOULD VAC PLACEMENT performed by Delia Garza MD at [address] [facility]      OTHER SURGICAL HISTORY Left 2016    CT biopsy ablasion left kidney    OTHER SURGICAL HISTORY  2016    small intestine 12 inch removed, 2 hernia surgeries, another surgery to drain infection from incision.      REVISION TOTAL KNEE ARTHROPLASTY Right 2019    RIGHT REVISION TIBIA TOTAL KNEE REPLACEMENT performed by Delia Garza MD at [facility] Right 2020 RIGHT KNEE IRRIGATION AND DEBRIDEMENT WITH POLY EXCHANGE performed by Jasen Schrader MD at [address] [facility]  10/15/13    RIGHT    TOTAL KNEE ARTHROPLASTY Right 8/12/2020    RIGHT ARTHROPLASY  RESECTION WITH INSERTION OF SPACER performed by Charlotte Rosario MD at [address]  6-7-2016    UPPER GASTROINTESTINAL ENDOSCOPY  04/02/2018       Immunization History:   Immunization History   Administered Date(s) Administered    HIB PRP-T (ActHIB, Hiberix) 09/13/2010    Hib, unspecified 02/09/2015    Influenza Virus Vaccine 02/03/2018    Influenza Whole 10/04/2012    Influenza, Intradermal, Preservative free 10/28/2014    Influenza, Intradermal, Quadrivalent, Preservative Free 12/13/2016    Influenza, Quadv, IM, PF (6 mo and older Fluzone, Flulaval, Fluarix, and 3 yrs and older Afluria) 12/18/2019, 11/10/2020    Influenza, Eliane Mustard, Recombinant, IM PF (Flublok 18 yrs and older) 10/15/2018    Meningococcal ACWY Vaccine 01/15/2009    Meningococcal MCV4O (Menveo) 08/10/2019, 10/30/2019    Meningococcal MCV4P (Menactra) 02/09/2015, 08/17/2019, 10/30/2019    Meningococcal MPSV4 (Menomune) 01/15/2009    Pneumococcal Conjugate 13-valent ([id number]) 02/09/2015    Pneumococcal Polysaccharide ([id number]) 01/15/2009, 10/09/2013, 08/09/2019    Td, unspecified formulation 01/01/2007    Tdap (Boostrix, Adacel) 01/01/2007, 05/03/2014       Active Problems:  Active Problems:    * No active hospital problems. *  Resolved Problems:    * No resolved hospital problems.  *      Isolation/Infection:       Nurse Assessment:  Last Vital Signs:Ht 6' (1.829 m)   Wt 230 lb (104.3 kg)   BMI 31.19 kg/m²   Last documented pain score (0-10 scale):    Last Weight:   Wt Readings from Last 1 Encounters:   01/26/21 233 lb 3.2 oz (105.8 kg)     Mental Status:  oriented and alert     IV Access:  - None    Nursing Mobility/ADLs:  Walking   Assisted Transfer  Assisted  Bathing  Assisted  Dressing  Assisted  Toileting  Assisted  Feeding  Independent  Med Admin  Independent  Med Delivery   whole    Wound Care Documentation and Therapy:  Keep glued Prineo dressing clean and intact. DO NOT remove. Prineo is waterproof for showering. Doctor will evaluated dressing for removal at office visit 2 weeks after surgery        Elimination:  Urinary Catheter: None   Colostomy/Ileostomy: No  Continence:   · Bowel: Yes  · Bladder: Yes  Date of Last BM: 2/15/21    Intake/Output Summary (Last 24 hours)   No intake or output data in the 24 hours ending 02/11/21 1213  Safety Concerns: At Risk for Falls    Impairments/Disabilities:      Vision    Nutrition Therapy:  Current Nutrition Therapy: No diet orders on file  Routes of Feeding: Oral  Liquids: No Restrictions  Daily Fluid Restriction: no  Last Modified Barium Swallow with Video (Video Swallowing Test): not done    Treatments at the Time of Hospital Discharge:   Respiratory Treatments:   Oxygen Therapy:  is not on home oxygen therapy.   Ventilator:    - No ventilator support    Lab orders for discharge:    Rehab Therapies: Physical Therapy, Occupational Therapy and nursing care  Weight Bearing Status/Restrictions: No weight bearing restirctions  Other Medical Equipment (for information only, NOT a DME order):  Rolling walker  Other Treatments: ASA 81mg twice at day for 14 days for DVT prophylaxis , bilateral MIAH hose for 2 weeks after surgery    Patient's personal belongings (please select all that are sent with patient):  Glasses    RN SIGNATURE:  Electronically signed by Chaka Flores RN on 2/16/21 at 11:58 AM EST    PHYSICIAN SECTION    Prognosis: Good    Condition at Discharge: Stable    Rehab Potential (if transferring to Rehab): Good Physician Certification: I certify the above orders, information, and transfer of Dany Atkins is necessary for the continuing treatment of the diagnosis listed and that he requires [address] for less 30 days. Update Admission H&P: No change in H&P    PHYSICIAN SIGNATURE:  Electronically signed by LILLIE Acosta CNP on 2/16/21 at 12:13 PM EST/ Dr Emmett Ga Status Date: ***    [facility] Readmission Risk Assessment Score:    Discharging to Facility/ Agency   Name:  [facility] care    Address: [address] [place] [address]., [address] [place] [address]., [place] [address]  Phone: [phone]  Fax: [phone]    / signature: {Esignature:[id number]:::0}      Followup with Dr Leland Ross 2 weeks after surgery in office   [place] [address] and Sports Medicine, [address] [place] [address],   [phone]     DISCHARGE INSTRUCTIONS FOR TOTAL KNEE REPLACEMENT  Activity:  ? Elevate your leg if swelling occurs in your ankle. Use elastic wraps/hose until swelling decreases. ? Continue the exercise program as prescribed by physical therapists. ? Take frequent walks. ? Use walker, crutches, or cane with weight bearing instructions as indicated by the physical therapists. ? Take rest periods often. Elevate leg during rest period. Wound Care:  ? Cover the wound with a sterile gauze dressing and change daily as long as there is drainage. ? Do not scrub wound. Pat it dry with a soft towel. ? Dont apply any lotions or creams to your wound. ? Check the incision every day for redness, swelling, or increase in drainage. Diet:  ? You can resume your normal diet. There are no limits on your diet due to your surgery. ? Pain pills and activity changes may lead to constipation. To prevent this, use prune juice or bran cereals liberally. You may need to use a laxative such as Dulcolax, Senokot, or Milk of Magnesia. ? Drink plenty of fluids. Medications:  ? Take pain pills if needed to maintain comfort. ? Never drive while taking pain medicine. ? Avoid over the counter medications until checking with your doctor. ? Resume previous medications as instructed by your doctor. You will be on aspirin or Eliquis  for 14 days only. Stay off other anti-inflammatory medications (except Celebrex)  Call Your Doctor If:  ? You have increased pain not controlled by medications. ? Excessive swelling in your ankle. ? You develop numbness, tingling, or decreased movement. ? You have a fever greater than 100 degrees for a day or over 101 degrees at any one time. ? Your wound becomes more reddened, starts draining, or opens. ? If you fall. You have any questions about your recovery. Inform your family doctor/dentist or any other doctor who cares for you in the future that you have a joint replacement. You may need antibiotics for dental or surgical procedures if there is any evidence of infection present. ? If you have required the use of insulin to control your blood sugar after surgery, follow up with your family doctor. ? Call your surgeons office to schedule your appointment to be seen after surgery. ? Make your appointment to continue physical therapy per doctors orders. ?  Smoking cessation assistance can be obtained from your family doctor or by calling [place] @ [phone]    _______________________________   _____   _______________________  ____                Patient Signature              Date      Witness                               Date

## 2021-02-11 NOTE — PROGRESS NOTES
[facility]  Office Visit    Chief Complaint: Postop follow-up right total knee arthroplasty infection with removal of prosthesis and placement of spacer    HPI:  Jeromy Abraham is a 53 y.o. who is here ahead of planned revision right total knee arthroplasty. For review, he underwent removal of his knee prosthesis with placement of an articulating spacer in August 2020. Cultures in January 2020 grew Corynebacterium jeikeium. He completed his IV antibiotics. He is here in the office today alone and is ambulating with use of a cane. He has been wearing a knee brace. He continues to report issues with feelings of knee instability. He has a lot of trouble on steps and getting up out of a chair. He denies any swelling or redness in the knee. He denies diabetes or heart or lung problems. He does have a history of a mild stroke/VTE. He does have sleep apnea. He smokes 2 cigarettes a day. He takes a baby aspirin once a day. He does not drink alcohol. 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  Chondromalacia of patellofemoral joint    Chronic pain syndrome    History of stroke    Gastroesophageal reflux disease with esophagitis--on daily ppi    Intractable chronic migraine without aura and with status migrainosus    Iron deficiency anemia    B12 deficiency    Malignant neoplasm of left kidney (HCC) clear cell. 8/1/18 Dr Princess Hobson.  Failed total knee, right[facility]    Infection of total knee replacement ([facility]    History of depression    History of alcoholism ([facility]    Infection of prosthetic right knee joint ([facility]       ROS:  Constitutional: denies fever, chills, weight loss  MSK: denies pain in other joints, muscle aches  Neurological: denies numbness, tingling, weakness    Exam:  Blood pressure 107/63, pulse 53, temperature 98.2 °F (36.8 °C), temperature source Temporal.    Appearance: sitting in exam room chair, appears to be in no acute distress, awake and alert  Resp: unlabored breathing on room air  Skin: warm, dry and intact with out erythema or significant increased temperature  Neuro: grossly intact both lower extremities. Intact sensation to light touch. Motor exam 4+ to 5/5 in all major motor groups. MSK: Right knee: Examination demonstrates no gross deformity. Incision is healed. Range of motion 5 to 120 degrees. The knee is stable to varus and valgus stress in full extension. At 90 degrees of flexion, he has gross instability in the anterior direction. Sensation is intact light touch. There is brisk capillary refill. Dorsalis pedis and posterior tibial pulses are intact. There is 5/5 muscle strength in all muscle groups. Imaging:  Prior right knee radiographs were reviewed and are significant for an all polyethylene tibial component cemented in place and femoral component cemented in place with a small area of cement debonding of the femoral component.     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 He continues to have issues with instability of his temporary spacer. We discussed revision right total knee arthroplasty. The operative procedure, alternatives, and risks were discussed in detail with the patient. The risks include but are not limited to: Infection, vessel injury, nerve injury, DVT, pulmonary embolism, implant loosening, need for revision surgery, loss of motion, continued pain. Despite these risks the patient would like to proceed. All questions have been answered and no guarantees have been made. The patient is unable to do further physical therapy due to disabling pain. I discussed with the patient the diagnosis in detail and answered all the questions. The patient verbalized understanding of the plan as it has been described above and is in agreement. Plan for revision right total knee arthroplasty with explant of current components. Plan to use a Johnny with femoral and tibial cones. I did discuss with the patient that I will send a frozen section intraoperatively and if it shows acute inflammation he may end up with a another temporary spacer. I recommended he cease all nicotine use around the time of surgery to improve his chances of healing and decrease risk of infection. Total time spent on today's encounter was between 30-39 minutes. This time included reviewing prior notes, radiographs, and lab results when available, reviewing history obtained by medical assistant, performing history and physical exam, reviewing tests/radiographs with the patient, counseling the patient, ordering medications or tests, documentation in the electronic health record, and coordination of care. This dictation was done with Dragon dictation and may contain mechanical errors related to translation.

## 2021-02-12 ENCOUNTER — OFFICE VISIT (OUTPATIENT)
Dept: PRIMARY CARE CLINIC | Age: 51
End: 2021-02-12
Payer: COMMERCIAL

## 2021-02-12 DIAGNOSIS — Z01.818 PREOP EXAMINATION: Primary | ICD-10-CM

## 2021-02-12 PROCEDURE — 99211 OFF/OP EST MAY X REQ PHY/QHP: CPT | Performed by: NURSE PRACTITIONER

## 2021-02-13 LAB
MRSA CULTURE ONLY: NORMAL
SARS-COV-2: NOT DETECTED

## 2021-02-15 ENCOUNTER — ANESTHESIA EVENT (OUTPATIENT)
Dept: OPERATING ROOM | Age: 51
End: 2021-02-15
Payer: COMMERCIAL

## 2021-02-16 ENCOUNTER — APPOINTMENT (OUTPATIENT)
Dept: GENERAL RADIOLOGY | Age: 51
End: 2021-02-16
Attending: ORTHOPAEDIC SURGERY
Payer: COMMERCIAL

## 2021-02-16 ENCOUNTER — ANESTHESIA (OUTPATIENT)
Dept: OPERATING ROOM | Age: 51
End: 2021-02-16
Payer: COMMERCIAL

## 2021-02-16 ENCOUNTER — HOSPITAL ENCOUNTER (OUTPATIENT)
Age: 51
Setting detail: OBSERVATION
Discharge: HOME | End: 2021-02-17
Attending: ORTHOPAEDIC SURGERY | Admitting: ORTHOPAEDIC SURGERY
Payer: COMMERCIAL

## 2021-02-16 VITALS
RESPIRATION RATE: 10 BRPM | DIASTOLIC BLOOD PRESSURE: 80 MMHG | SYSTOLIC BLOOD PRESSURE: 131 MMHG | TEMPERATURE: 98.1 F | OXYGEN SATURATION: 98 %

## 2021-02-16 DIAGNOSIS — T84.50XA PROSTHETIC JOINT INFECTION, INITIAL ENCOUNTER (HCC): ICD-10-CM

## 2021-02-16 DIAGNOSIS — Z96.651 HISTORY OF TOTAL KNEE ARTHROPLASTY, RIGHT: Primary | ICD-10-CM

## 2021-02-16 LAB
ABO/RH: NORMAL
ANTIBODY SCREEN: NORMAL
GLUCOSE BLD-MCNC: 122 MG/DL (ref 70–99)
GLUCOSE BLD-MCNC: 164 MG/DL (ref 70–99)
PERFORMED ON: ABNORMAL
PERFORMED ON: ABNORMAL

## 2021-02-16 PROCEDURE — 97165 OT EVAL LOW COMPLEX 30 MIN: CPT

## 2021-02-16 PROCEDURE — 88305 TISSUE EXAM BY PATHOLOGIST: CPT

## 2021-02-16 PROCEDURE — 6360000002 HC RX W HCPCS: Performed by: ORTHOPAEDIC SURGERY

## 2021-02-16 PROCEDURE — 96376 TX/PRO/DX INJ SAME DRUG ADON: CPT

## 2021-02-16 PROCEDURE — 64447 NJX AA&/STRD FEMORAL NRV IMG: CPT | Performed by: ANESTHESIOLOGY

## 2021-02-16 PROCEDURE — 2500000003 HC RX 250 WO HCPCS: Performed by: NURSE ANESTHETIST, CERTIFIED REGISTERED

## 2021-02-16 PROCEDURE — 87070 CULTURE OTHR SPECIMN AEROBIC: CPT

## 2021-02-16 PROCEDURE — C1776 JOINT DEVICE (IMPLANTABLE): HCPCS | Performed by: ORTHOPAEDIC SURGERY

## 2021-02-16 PROCEDURE — G0378 HOSPITAL OBSERVATION PER HR: HCPCS

## 2021-02-16 PROCEDURE — 97162 PT EVAL MOD COMPLEX 30 MIN: CPT

## 2021-02-16 PROCEDURE — 96375 TX/PRO/DX INJ NEW DRUG ADDON: CPT

## 2021-02-16 PROCEDURE — 97535 SELF CARE MNGMENT TRAINING: CPT

## 2021-02-16 PROCEDURE — 97530 THERAPEUTIC ACTIVITIES: CPT

## 2021-02-16 PROCEDURE — 3700000001 HC ADD 15 MINUTES (ANESTHESIA): Performed by: ORTHOPAEDIC SURGERY

## 2021-02-16 PROCEDURE — 6360000002 HC RX W HCPCS: Performed by: NURSE ANESTHETIST, CERTIFIED REGISTERED

## 2021-02-16 PROCEDURE — 88331 PATH CONSLTJ SURG 1 BLK 1SPC: CPT

## 2021-02-16 PROCEDURE — 3600000014 HC SURGERY LEVEL 4 ADDTL 15MIN: Performed by: ORTHOPAEDIC SURGERY

## 2021-02-16 PROCEDURE — 86901 BLOOD TYPING SEROLOGIC RH(D): CPT

## 2021-02-16 PROCEDURE — 86900 BLOOD TYPING SEROLOGIC ABO: CPT

## 2021-02-16 PROCEDURE — 2060000000 HC ICU INTERMEDIATE R&B

## 2021-02-16 PROCEDURE — 2580000003 HC RX 258: Performed by: ORTHOPAEDIC SURGERY

## 2021-02-16 PROCEDURE — 3600000004 HC SURGERY LEVEL 4 BASE: Performed by: ORTHOPAEDIC SURGERY

## 2021-02-16 PROCEDURE — 76942 ECHO GUIDE FOR BIOPSY: CPT | Performed by: ANESTHESIOLOGY

## 2021-02-16 PROCEDURE — 6370000000 HC RX 637 (ALT 250 FOR IP): Performed by: INTERNAL MEDICINE

## 2021-02-16 PROCEDURE — 87205 SMEAR GRAM STAIN: CPT

## 2021-02-16 PROCEDURE — 6370000000 HC RX 637 (ALT 250 FOR IP): Performed by: ORTHOPAEDIC SURGERY

## 2021-02-16 PROCEDURE — 6370000000 HC RX 637 (ALT 250 FOR IP): Performed by: NURSE PRACTITIONER

## 2021-02-16 PROCEDURE — 97116 GAIT TRAINING THERAPY: CPT

## 2021-02-16 PROCEDURE — 86850 RBC ANTIBODY SCREEN: CPT

## 2021-02-16 PROCEDURE — 6360000002 HC RX W HCPCS: Performed by: ANESTHESIOLOGY

## 2021-02-16 PROCEDURE — 7100000001 HC PACU RECOVERY - ADDTL 15 MIN: Performed by: ORTHOPAEDIC SURGERY

## 2021-02-16 PROCEDURE — 99253 IP/OBS CNSLTJ NEW/EST LOW 45: CPT | Performed by: INTERNAL MEDICINE

## 2021-02-16 PROCEDURE — 87075 CULTR BACTERIA EXCEPT BLOOD: CPT

## 2021-02-16 PROCEDURE — C1713 ANCHOR/SCREW BN/BN,TIS/BN: HCPCS | Performed by: ORTHOPAEDIC SURGERY

## 2021-02-16 PROCEDURE — 3700000000 HC ANESTHESIA ATTENDED CARE: Performed by: ORTHOPAEDIC SURGERY

## 2021-02-16 PROCEDURE — 2720000010 HC SURG SUPPLY STERILE: Performed by: ORTHOPAEDIC SURGERY

## 2021-02-16 PROCEDURE — 2580000003 HC RX 258: Performed by: ANESTHESIOLOGY

## 2021-02-16 PROCEDURE — 88300 SURGICAL PATH GROSS: CPT

## 2021-02-16 PROCEDURE — 2700000000 HC OXYGEN THERAPY PER DAY

## 2021-02-16 PROCEDURE — 73560 X-RAY EXAM OF KNEE 1 OR 2: CPT

## 2021-02-16 PROCEDURE — 7100000000 HC PACU RECOVERY - FIRST 15 MIN: Performed by: ORTHOPAEDIC SURGERY

## 2021-02-16 PROCEDURE — 94761 N-INVAS EAR/PLS OXIMETRY MLT: CPT

## 2021-02-16 PROCEDURE — 96365 THER/PROPH/DIAG IV INF INIT: CPT

## 2021-02-16 PROCEDURE — 2709999900 HC NON-CHARGEABLE SUPPLY: Performed by: ORTHOPAEDIC SURGERY

## 2021-02-16 DEVICE — CEMENT BNE 20ML 41GM FULL DOSE PMMA W/ TOBRA M VISC RADPQ: Type: IMPLANTABLE DEVICE | Site: KNEE | Status: FUNCTIONAL

## 2021-02-16 DEVICE — BASEPLATE TIB SZ 5 UNIV KNEE TRITANIUM TOT STBL CEM: Type: IMPLANTABLE DEVICE | Site: KNEE | Status: FUNCTIONAL

## 2021-02-16 DEVICE — IMPLANT PAT DIA32MM THK10MM X3 ASYM TRIATHLON: Type: IMPLANTABLE DEVICE | Site: KNEE | Status: FUNCTIONAL

## 2021-02-16 DEVICE — IMPLANTABLE DEVICE: Type: IMPLANTABLE DEVICE | Site: KNEE | Status: FUNCTIONAL

## 2021-02-16 DEVICE — COMPONENT FEM SZ 5 R KNEE TOT STBL TRIATHLON: Type: IMPLANTABLE DEVICE | Site: KNEE | Status: FUNCTIONAL

## 2021-02-16 DEVICE — CEMENT BIOPREP ST: Type: IMPLANTABLE DEVICE | Site: KNEE | Status: FUNCTIONAL

## 2021-02-16 DEVICE — CEMENT BNE RADIOPAQUE FAST SET ACRYL RESIN HI VISC SIMPLEXHV: Type: IMPLANTABLE DEVICE | Site: KNEE | Status: FUNCTIONAL

## 2021-02-16 DEVICE — AUGMENT FEM SZ 5 THK5MM POST KNEE CO CHROM TOT STBL REV: Type: IMPLANTABLE DEVICE | Site: KNEE | Status: FUNCTIONAL

## 2021-02-16 DEVICE — STEM TIB L50MM DIA15MM CO CHROM TOT STBL CEM TRIATHLON: Type: IMPLANTABLE DEVICE | Site: KNEE | Status: FUNCTIONAL

## 2021-02-16 RX ORDER — OXYCODONE HYDROCHLORIDE AND ACETAMINOPHEN 5; 325 MG/1; MG/1
1 TABLET ORAL PRN
Status: DISCONTINUED | OUTPATIENT
Start: 2021-02-16 | End: 2021-02-16 | Stop reason: HOSPADM

## 2021-02-16 RX ORDER — SODIUM CHLORIDE 0.9 % (FLUSH) 0.9 %
10 SYRINGE (ML) INJECTION PRN
Status: DISCONTINUED | OUTPATIENT
Start: 2021-02-16 | End: 2021-02-17 | Stop reason: HOSPADM

## 2021-02-16 RX ORDER — ONDANSETRON 2 MG/ML
INJECTION INTRAMUSCULAR; INTRAVENOUS PRN
Status: DISCONTINUED | OUTPATIENT
Start: 2021-02-16 | End: 2021-02-16 | Stop reason: SDUPTHER

## 2021-02-16 RX ORDER — FENTANYL CITRATE 50 UG/ML
25 INJECTION, SOLUTION INTRAMUSCULAR; INTRAVENOUS EVERY 5 MIN PRN
Status: DISCONTINUED | OUTPATIENT
Start: 2021-02-16 | End: 2021-02-16 | Stop reason: HOSPADM

## 2021-02-16 RX ORDER — INSULIN GLARGINE 100 [IU]/ML
0.25 INJECTION, SOLUTION SUBCUTANEOUS NIGHTLY
Status: DISCONTINUED | OUTPATIENT
Start: 2021-02-16 | End: 2021-02-17

## 2021-02-16 RX ORDER — SODIUM CHLORIDE 0.9 % (FLUSH) 0.9 %
10 SYRINGE (ML) INJECTION EVERY 12 HOURS SCHEDULED
Status: DISCONTINUED | OUTPATIENT
Start: 2021-02-16 | End: 2021-02-16 | Stop reason: HOSPADM

## 2021-02-16 RX ORDER — ATORVASTATIN CALCIUM 40 MG/1
40 TABLET, FILM COATED ORAL NIGHTLY
Status: DISCONTINUED | OUTPATIENT
Start: 2021-02-16 | End: 2021-02-17 | Stop reason: HOSPADM

## 2021-02-16 RX ORDER — FLUOXETINE HYDROCHLORIDE 20 MG/1
20 CAPSULE ORAL DAILY
Status: DISCONTINUED | OUTPATIENT
Start: 2021-02-17 | End: 2021-02-17 | Stop reason: HOSPADM

## 2021-02-16 RX ORDER — OXYCODONE HYDROCHLORIDE 5 MG/1
5 TABLET ORAL EVERY 4 HOURS PRN
Status: DISCONTINUED | OUTPATIENT
Start: 2021-02-16 | End: 2021-02-16

## 2021-02-16 RX ORDER — GLYCOPYRROLATE 0.2 MG/ML
INJECTION INTRAMUSCULAR; INTRAVENOUS PRN
Status: DISCONTINUED | OUTPATIENT
Start: 2021-02-16 | End: 2021-02-16 | Stop reason: SDUPTHER

## 2021-02-16 RX ORDER — SENNA AND DOCUSATE SODIUM 50; 8.6 MG/1; MG/1
1 TABLET, FILM COATED ORAL 2 TIMES DAILY
Status: DISCONTINUED | OUTPATIENT
Start: 2021-02-16 | End: 2021-02-17 | Stop reason: HOSPADM

## 2021-02-16 RX ORDER — OXYCODONE HYDROCHLORIDE 10 MG/1
10 TABLET ORAL ONCE
Status: COMPLETED | OUTPATIENT
Start: 2021-02-16 | End: 2021-02-16

## 2021-02-16 RX ORDER — ZOLPIDEM TARTRATE 5 MG/1
5 TABLET ORAL NIGHTLY PRN
Status: DISCONTINUED | OUTPATIENT
Start: 2021-02-16 | End: 2021-02-17 | Stop reason: HOSPADM

## 2021-02-16 RX ORDER — HYDROCODONE BITARTRATE AND ACETAMINOPHEN 5; 325 MG/1; MG/1
1 TABLET ORAL EVERY 4 HOURS PRN
Status: DISCONTINUED | OUTPATIENT
Start: 2021-02-16 | End: 2021-02-17 | Stop reason: HOSPADM

## 2021-02-16 RX ORDER — HYDROCODONE BITARTRATE AND ACETAMINOPHEN 5; 325 MG/1; MG/1
2 TABLET ORAL EVERY 4 HOURS PRN
Status: DISCONTINUED | OUTPATIENT
Start: 2021-02-16 | End: 2021-02-17 | Stop reason: HOSPADM

## 2021-02-16 RX ORDER — SODIUM CHLORIDE 9 MG/ML
INJECTION, SOLUTION INTRAVENOUS CONTINUOUS
Status: DISCONTINUED | OUTPATIENT
Start: 2021-02-16 | End: 2021-02-16

## 2021-02-16 RX ORDER — PROPOFOL 10 MG/ML
INJECTION, EMULSION INTRAVENOUS PRN
Status: DISCONTINUED | OUTPATIENT
Start: 2021-02-16 | End: 2021-02-16 | Stop reason: SDUPTHER

## 2021-02-16 RX ORDER — ONDANSETRON 2 MG/ML
4 INJECTION INTRAMUSCULAR; INTRAVENOUS
Status: DISCONTINUED | OUTPATIENT
Start: 2021-02-16 | End: 2021-02-16 | Stop reason: HOSPADM

## 2021-02-16 RX ORDER — ROPIVACAINE HYDROCHLORIDE 5 MG/ML
INJECTION, SOLUTION EPIDURAL; INFILTRATION; PERINEURAL
Status: COMPLETED | OUTPATIENT
Start: 2021-02-16 | End: 2021-02-16

## 2021-02-16 RX ORDER — ACETAMINOPHEN 325 MG/1
650 TABLET ORAL EVERY 6 HOURS
Status: DISCONTINUED | OUTPATIENT
Start: 2021-02-16 | End: 2021-02-17

## 2021-02-16 RX ORDER — ASPIRIN 81 MG/1
81 TABLET ORAL 2 TIMES DAILY
Status: DISCONTINUED | OUTPATIENT
Start: 2021-02-16 | End: 2021-02-17 | Stop reason: HOSPADM

## 2021-02-16 RX ORDER — MAGNESIUM HYDROXIDE 1200 MG/15ML
LIQUID ORAL CONTINUOUS PRN
Status: COMPLETED | OUTPATIENT
Start: 2021-02-16 | End: 2021-02-16

## 2021-02-16 RX ORDER — ONDANSETRON 2 MG/ML
4 INJECTION INTRAMUSCULAR; INTRAVENOUS EVERY 6 HOURS PRN
Status: DISCONTINUED | OUTPATIENT
Start: 2021-02-16 | End: 2021-02-17 | Stop reason: HOSPADM

## 2021-02-16 RX ORDER — SODIUM CHLORIDE 0.9 % (FLUSH) 0.9 %
10 SYRINGE (ML) INJECTION EVERY 12 HOURS SCHEDULED
Status: DISCONTINUED | OUTPATIENT
Start: 2021-02-16 | End: 2021-02-17 | Stop reason: HOSPADM

## 2021-02-16 RX ORDER — OXYCODONE HYDROCHLORIDE AND ACETAMINOPHEN 5; 325 MG/1; MG/1
2 TABLET ORAL PRN
Status: DISCONTINUED | OUTPATIENT
Start: 2021-02-16 | End: 2021-02-16 | Stop reason: HOSPADM

## 2021-02-16 RX ORDER — TRAZODONE HYDROCHLORIDE 100 MG/1
200 TABLET ORAL NIGHTLY
Status: DISCONTINUED | OUTPATIENT
Start: 2021-02-16 | End: 2021-02-17 | Stop reason: HOSPADM

## 2021-02-16 RX ORDER — HYDROCODONE BITARTRATE AND ACETAMINOPHEN 5; 325 MG/1; MG/1
1-2 TABLET ORAL EVERY 4 HOURS PRN
Qty: 60 TABLET | Refills: 0 | Status: SHIPPED | OUTPATIENT
Start: 2021-02-16 | End: 2021-02-19 | Stop reason: SDUPTHER

## 2021-02-16 RX ORDER — ONDANSETRON 4 MG/1
4 TABLET, ORALLY DISINTEGRATING ORAL EVERY 8 HOURS PRN
Status: DISCONTINUED | OUTPATIENT
Start: 2021-02-16 | End: 2021-02-17 | Stop reason: HOSPADM

## 2021-02-16 RX ORDER — ROPIVACAINE HYDROCHLORIDE 2 MG/ML
INJECTION, SOLUTION EPIDURAL; INFILTRATION; PERINEURAL
Status: COMPLETED | OUTPATIENT
Start: 2021-02-16 | End: 2021-02-16

## 2021-02-16 RX ORDER — FENTANYL CITRATE 50 UG/ML
INJECTION, SOLUTION INTRAMUSCULAR; INTRAVENOUS PRN
Status: DISCONTINUED | OUTPATIENT
Start: 2021-02-16 | End: 2021-02-16 | Stop reason: SDUPTHER

## 2021-02-16 RX ORDER — LIDOCAINE HYDROCHLORIDE 20 MG/ML
INJECTION, SOLUTION EPIDURAL; INFILTRATION; INTRACAUDAL; PERINEURAL PRN
Status: DISCONTINUED | OUTPATIENT
Start: 2021-02-16 | End: 2021-02-16 | Stop reason: SDUPTHER

## 2021-02-16 RX ORDER — SODIUM CHLORIDE 450 MG/100ML
INJECTION, SOLUTION INTRAVENOUS CONTINUOUS
Status: DISCONTINUED | OUTPATIENT
Start: 2021-02-16 | End: 2021-02-17 | Stop reason: HOSPADM

## 2021-02-16 RX ORDER — NICOTINE POLACRILEX 4 MG
15 LOZENGE BUCCAL PRN
Status: DISCONTINUED | OUTPATIENT
Start: 2021-02-16 | End: 2021-02-17

## 2021-02-16 RX ORDER — DOXYCYCLINE HYCLATE 100 MG
100 TABLET ORAL EVERY 12 HOURS SCHEDULED
Status: DISCONTINUED | OUTPATIENT
Start: 2021-02-16 | End: 2021-02-17 | Stop reason: HOSPADM

## 2021-02-16 RX ORDER — ROCURONIUM BROMIDE 10 MG/ML
INJECTION, SOLUTION INTRAVENOUS PRN
Status: DISCONTINUED | OUTPATIENT
Start: 2021-02-16 | End: 2021-02-16 | Stop reason: SDUPTHER

## 2021-02-16 RX ORDER — DOXYCYCLINE HYCLATE 100 MG/1
100 CAPSULE ORAL 2 TIMES DAILY
Qty: 60 CAPSULE | Refills: 1 | Status: SHIPPED | OUTPATIENT
Start: 2021-02-16 | End: 2021-02-17 | Stop reason: SDUPTHER

## 2021-02-16 RX ORDER — MIDAZOLAM HYDROCHLORIDE 1 MG/ML
INJECTION INTRAMUSCULAR; INTRAVENOUS PRN
Status: DISCONTINUED | OUTPATIENT
Start: 2021-02-16 | End: 2021-02-16 | Stop reason: SDUPTHER

## 2021-02-16 RX ORDER — SODIUM CHLORIDE 0.9 % (FLUSH) 0.9 %
10 SYRINGE (ML) INJECTION PRN
Status: DISCONTINUED | OUTPATIENT
Start: 2021-02-16 | End: 2021-02-16 | Stop reason: HOSPADM

## 2021-02-16 RX ORDER — DEXTROSE MONOHYDRATE 50 MG/ML
100 INJECTION, SOLUTION INTRAVENOUS PRN
Status: DISCONTINUED | OUTPATIENT
Start: 2021-02-16 | End: 2021-02-17

## 2021-02-16 RX ORDER — ASPIRIN 81 MG/1
81 TABLET ORAL DAILY
Qty: 28 TABLET | Refills: 0 | Status: SHIPPED | OUTPATIENT
Start: 2021-02-16 | End: 2021-03-29 | Stop reason: ALTCHOICE

## 2021-02-16 RX ORDER — DEXTROSE MONOHYDRATE 25 G/50ML
12.5 INJECTION, SOLUTION INTRAVENOUS PRN
Status: DISCONTINUED | OUTPATIENT
Start: 2021-02-16 | End: 2021-02-17

## 2021-02-16 RX ORDER — OXYCODONE HYDROCHLORIDE 10 MG/1
10 TABLET ORAL EVERY 4 HOURS PRN
Status: DISCONTINUED | OUTPATIENT
Start: 2021-02-16 | End: 2021-02-16

## 2021-02-16 RX ORDER — DEXAMETHASONE SODIUM PHOSPHATE 4 MG/ML
INJECTION, SOLUTION INTRA-ARTICULAR; INTRALESIONAL; INTRAMUSCULAR; INTRAVENOUS; SOFT TISSUE PRN
Status: DISCONTINUED | OUTPATIENT
Start: 2021-02-16 | End: 2021-02-16 | Stop reason: SDUPTHER

## 2021-02-16 RX ORDER — MIDAZOLAM HYDROCHLORIDE 1 MG/ML
INJECTION INTRAMUSCULAR; INTRAVENOUS
Status: COMPLETED
Start: 2021-02-16 | End: 2021-02-16

## 2021-02-16 RX ADMIN — DEXAMETHASONE SODIUM PHOSPHATE 8 MG: 4 INJECTION, SOLUTION INTRAMUSCULAR; INTRAVENOUS at 08:02

## 2021-02-16 RX ADMIN — INSULIN LISPRO 1 UNITS: 100 INJECTION, SOLUTION INTRAVENOUS; SUBCUTANEOUS at 21:46

## 2021-02-16 RX ADMIN — HYDROMORPHONE HYDROCHLORIDE 0.5 MG: 1 INJECTION, SOLUTION INTRAMUSCULAR; INTRAVENOUS; SUBCUTANEOUS at 09:55

## 2021-02-16 RX ADMIN — SODIUM CHLORIDE, PRESERVATIVE FREE 10 ML: 5 INJECTION INTRAVENOUS at 11:12

## 2021-02-16 RX ADMIN — CEFAZOLIN SODIUM 2000 MG: 10 INJECTION, POWDER, FOR SOLUTION INTRAVENOUS at 22:31

## 2021-02-16 RX ADMIN — ROPIVACAINE HYDROCHLORIDE 30 ML: 5 INJECTION, SOLUTION EPIDURAL; INFILTRATION; PERINEURAL at 07:03

## 2021-02-16 RX ADMIN — GLYCOPYRROLATE 0.2 MG: 0.2 INJECTION, SOLUTION INTRAMUSCULAR; INTRAVENOUS at 07:52

## 2021-02-16 RX ADMIN — HYDROMORPHONE HYDROCHLORIDE 0.5 MG: 1 INJECTION, SOLUTION INTRAMUSCULAR; INTRAVENOUS; SUBCUTANEOUS at 09:35

## 2021-02-16 RX ADMIN — DOCUSATE SODIUM 50 MG AND SENNOSIDES 8.6 MG 1 TABLET: 8.6; 5 TABLET, FILM COATED ORAL at 11:37

## 2021-02-16 RX ADMIN — ROCURONIUM BROMIDE 50 MG: 10 INJECTION INTRAVENOUS at 07:40

## 2021-02-16 RX ADMIN — ZOLPIDEM TARTRATE 5 MG: 5 TABLET ORAL at 21:29

## 2021-02-16 RX ADMIN — OXYCODONE HYDROCHLORIDE 10 MG: 10 TABLET ORAL at 06:37

## 2021-02-16 RX ADMIN — DOXYCYCLINE HYCLATE 100 MG: 100 TABLET, COATED ORAL at 15:34

## 2021-02-16 RX ADMIN — HYDROMORPHONE HYDROCHLORIDE 0.5 MG: 1 INJECTION, SOLUTION INTRAMUSCULAR; INTRAVENOUS; SUBCUTANEOUS at 12:43

## 2021-02-16 RX ADMIN — FENTANYL CITRATE 50 MCG: 50 INJECTION INTRAMUSCULAR; INTRAVENOUS at 07:40

## 2021-02-16 RX ADMIN — VANCOMYCIN HYDROCHLORIDE 1750 MG: 1 INJECTION, POWDER, LYOPHILIZED, FOR SOLUTION INTRAVENOUS at 06:50

## 2021-02-16 RX ADMIN — SUGAMMADEX 200 MG: 100 INJECTION, SOLUTION INTRAVENOUS at 09:23

## 2021-02-16 RX ADMIN — ROPIVACAINE HYDROCHLORIDE 20 ML: 2 INJECTION, SOLUTION EPIDURAL; INFILTRATION at 07:02

## 2021-02-16 RX ADMIN — MIDAZOLAM 2 MG: 1 INJECTION INTRAMUSCULAR; INTRAVENOUS at 07:01

## 2021-02-16 RX ADMIN — ONDANSETRON 4 MG: 2 INJECTION INTRAMUSCULAR; INTRAVENOUS at 07:24

## 2021-02-16 RX ADMIN — HYDROMORPHONE HYDROCHLORIDE 0.5 MG: 1 INJECTION, SOLUTION INTRAMUSCULAR; INTRAVENOUS; SUBCUTANEOUS at 17:04

## 2021-02-16 RX ADMIN — CEFAZOLIN 2 G: 10 INJECTION, POWDER, FOR SOLUTION INTRAVENOUS at 07:27

## 2021-02-16 RX ADMIN — FENTANYL CITRATE 25 MCG: 50 INJECTION, SOLUTION INTRAMUSCULAR; INTRAVENOUS at 10:38

## 2021-02-16 RX ADMIN — OXYCODONE HYDROCHLORIDE 10 MG: 10 TABLET ORAL at 11:34

## 2021-02-16 RX ADMIN — INSULIN GLARGINE 24 UNITS: 100 INJECTION, SOLUTION SUBCUTANEOUS at 21:45

## 2021-02-16 RX ADMIN — ACETAMINOPHEN 650 MG: 325 TABLET ORAL at 11:34

## 2021-02-16 RX ADMIN — FENTANYL CITRATE 50 MCG: 50 INJECTION INTRAMUSCULAR; INTRAVENOUS at 08:15

## 2021-02-16 RX ADMIN — SODIUM CHLORIDE: 9 INJECTION, SOLUTION INTRAVENOUS at 06:49

## 2021-02-16 RX ADMIN — HYDROCODONE BITARTRATE AND ACETAMINOPHEN 2 TABLET: 5; 325 TABLET ORAL at 20:24

## 2021-02-16 RX ADMIN — DOCUSATE SODIUM 50 MG AND SENNOSIDES 8.6 MG 1 TABLET: 8.6; 5 TABLET, FILM COATED ORAL at 20:13

## 2021-02-16 RX ADMIN — HYDROMORPHONE HYDROCHLORIDE 0.5 MG: 1 INJECTION, SOLUTION INTRAMUSCULAR; INTRAVENOUS; SUBCUTANEOUS at 10:05

## 2021-02-16 RX ADMIN — PROPOFOL 200 MG: 10 INJECTION, EMULSION INTRAVENOUS at 07:40

## 2021-02-16 RX ADMIN — HYDROCODONE BITARTRATE AND ACETAMINOPHEN 2 TABLET: 5; 325 TABLET ORAL at 15:34

## 2021-02-16 RX ADMIN — LIDOCAINE HYDROCHLORIDE 100 MG: 20 INJECTION, SOLUTION EPIDURAL; INFILTRATION; INTRACAUDAL; PERINEURAL at 07:40

## 2021-02-16 RX ADMIN — ACETAMINOPHEN 650 MG: 325 TABLET ORAL at 17:04

## 2021-02-16 RX ADMIN — TRAZODONE HYDROCHLORIDE 200 MG: 100 TABLET ORAL at 21:29

## 2021-02-16 RX ADMIN — ATORVASTATIN CALCIUM 40 MG: 40 TABLET, FILM COATED ORAL at 20:14

## 2021-02-16 RX ADMIN — CEFAZOLIN SODIUM 2000 MG: 10 INJECTION, POWDER, FOR SOLUTION INTRAVENOUS at 15:33

## 2021-02-16 RX ADMIN — HYDROMORPHONE HYDROCHLORIDE 0.5 MG: 1 INJECTION, SOLUTION INTRAMUSCULAR; INTRAVENOUS; SUBCUTANEOUS at 10:13

## 2021-02-16 RX ADMIN — SODIUM CHLORIDE: 4.5 INJECTION, SOLUTION INTRAVENOUS at 11:37

## 2021-02-16 RX ADMIN — ASPIRIN 81 MG: 81 TABLET, FILM COATED ORAL at 20:13

## 2021-02-16 RX ADMIN — HYDROMORPHONE HYDROCHLORIDE 0.5 MG: 1 INJECTION, SOLUTION INTRAMUSCULAR; INTRAVENOUS; SUBCUTANEOUS at 21:29

## 2021-02-16 ASSESSMENT — PAIN DESCRIPTION - ONSET
ONSET: ON-GOING

## 2021-02-16 ASSESSMENT — PAIN DESCRIPTION - PAIN TYPE
TYPE: ACUTE PAIN;SURGICAL PAIN
TYPE: ACUTE PAIN
TYPE: SURGICAL PAIN
TYPE: ACUTE PAIN;SURGICAL PAIN

## 2021-02-16 ASSESSMENT — PAIN - FUNCTIONAL ASSESSMENT
PAIN_FUNCTIONAL_ASSESSMENT: PREVENTS OR INTERFERES SOME ACTIVE ACTIVITIES AND ADLS
PAIN_FUNCTIONAL_ASSESSMENT: PREVENTS OR INTERFERES WITH MANY ACTIVE NOT PASSIVE ACTIVITIES
PAIN_FUNCTIONAL_ASSESSMENT: PREVENTS OR INTERFERES WITH MANY ACTIVE NOT PASSIVE ACTIVITIES
PAIN_FUNCTIONAL_ASSESSMENT: PREVENTS OR INTERFERES SOME ACTIVE ACTIVITIES AND ADLS
PAIN_FUNCTIONAL_ASSESSMENT: PREVENTS OR INTERFERES WITH MANY ACTIVE NOT PASSIVE ACTIVITIES
PAIN_FUNCTIONAL_ASSESSMENT: PREVENTS OR INTERFERES SOME ACTIVE ACTIVITIES AND ADLS

## 2021-02-16 ASSESSMENT — PAIN DESCRIPTION - ORIENTATION
ORIENTATION: RIGHT

## 2021-02-16 ASSESSMENT — PULMONARY FUNCTION TESTS
PIF_VALUE: 10
PIF_VALUE: 10
PIF_VALUE: 12
PIF_VALUE: 12
PIF_VALUE: 11
PIF_VALUE: 12
PIF_VALUE: 9
PIF_VALUE: 12
PIF_VALUE: 4
PIF_VALUE: 10
PIF_VALUE: 12
PIF_VALUE: 11
PIF_VALUE: 12
PIF_VALUE: 2
PIF_VALUE: 11
PIF_VALUE: 10
PIF_VALUE: 12
PIF_VALUE: 10
PIF_VALUE: 3
PIF_VALUE: 11
PIF_VALUE: 10
PIF_VALUE: 12
PIF_VALUE: 35
PIF_VALUE: 12
PIF_VALUE: 10
PIF_VALUE: 12
PIF_VALUE: 12
PIF_VALUE: 1
PIF_VALUE: 12
PIF_VALUE: 11
PIF_VALUE: 12
PIF_VALUE: 10
PIF_VALUE: 10
PIF_VALUE: 12
PIF_VALUE: 3
PIF_VALUE: 10
PIF_VALUE: 3
PIF_VALUE: 10
PIF_VALUE: 12
PIF_VALUE: 10
PIF_VALUE: 0
PIF_VALUE: 17
PIF_VALUE: 11
PIF_VALUE: 10
PIF_VALUE: 1
PIF_VALUE: 3
PIF_VALUE: 11
PIF_VALUE: 12
PIF_VALUE: 10

## 2021-02-16 ASSESSMENT — PAIN DESCRIPTION - FREQUENCY
FREQUENCY: CONTINUOUS

## 2021-02-16 ASSESSMENT — PAIN DESCRIPTION - DESCRIPTORS
DESCRIPTORS: ACHING;CONSTANT
DESCRIPTORS: CONSTANT;ACHING
DESCRIPTORS: ACHING;CONSTANT
DESCRIPTORS: ACHING;SHARP
DESCRIPTORS: SHARP
DESCRIPTORS: ACHING;CONSTANT
DESCRIPTORS: ACHING;CONSTANT

## 2021-02-16 ASSESSMENT — PAIN DESCRIPTION - PROGRESSION
CLINICAL_PROGRESSION: NOT CHANGED
CLINICAL_PROGRESSION: GRADUALLY WORSENING
CLINICAL_PROGRESSION: NOT CHANGED

## 2021-02-16 ASSESSMENT — PAIN SCALES - GENERAL
PAINLEVEL_OUTOF10: 8
PAINLEVEL_OUTOF10: 4
PAINLEVEL_OUTOF10: 8
PAINLEVEL_OUTOF10: 7
PAINLEVEL_OUTOF10: 1
PAINLEVEL_OUTOF10: 9
PAINLEVEL_OUTOF10: 7
PAINLEVEL_OUTOF10: 6
PAINLEVEL_OUTOF10: 8
PAINLEVEL_OUTOF10: 5

## 2021-02-16 ASSESSMENT — PAIN DESCRIPTION - LOCATION
LOCATION: KNEE

## 2021-02-16 NOTE — CONSULTS
Infectious Diseases Inpatient Consult Note      Reason for Consult:  S/p Rt TKA revision h/o R TKA infection    Requesting Physician:  Cydney Gruber     Primary Care Physician:  Tatianna Headley MD    History Obtained From:  [facility] and patient     CHIEF COMPLAINT:   Rt TKA revision       HISTORY OF PRESENT ILLNESS:  53 y.o. man with a significant history for gastric bypass surgery with a history  Intestinal obstruction in the past also had nephrectomy and splenectomy admitted for right total knee arthroplasty revision on 2/16/21. Vicki Parker He underwent right total knee arthroplasty by Juan Antonio Ragland in Dec 2019 and following this was admitted with Rt knee swelling and fluid collection was taken back to OR for ID and liner exchange by  on 1/22/20  and OP cx negative but one cx positive for Corynebacterium jeikeium from BROTH cx only he completed a course of iv abx and underwent repeat ID and wound vac placement by Juan Antonio Ragland for delayed wound healing in Feb 2020 and he completed IV abx course and placed on oral Doxycyline suppression was seen in ID office in March 2020. He underwent Rt TKA explantation and spacer placement as the Knee fluid analysis pre op was abnormal and underwent repeat incision and drainage and spacer placement on 8/12/20 and completed full x 6 weeks course of IV ANTIBIOTICS and his knee wound closed and healed well was discharged from the clinic and off late now unfortunately now dealing with knee instability and pain and now admitted for Rt TKA revision and this was completed on 2/16/21 by Cydney Gruber. Per OP notes no inflammation noted and given the history of previous TKA infection we are consulted for recommendations. He has lost wt as unable to eat secondary to abdominal pain from previous bowel surgeries and now improving and he is under pain clinic management currently. He has no Rt Knee swelling or redness or open wound pre operatively.   location :Rt knee pain        Quality : aching Severity : 4/10     Duration : chronic      Timing : intermittent  Context : h/o Rt TKA and infection and revision and spacer placement in Aug 2020    Modifying factors :better with pain meds  Associated signs and symptoms:No fevers, no chills no knee warmth or open wounds        Past Medical History:    Past Medical History:   Diagnosis Date    Alcoholism ([facility]     last drink 2015    Allergic migraine with status migrainosus     Allergic rhinitis, seasonal     Anemia     Arthritis     right knee    Vaughn's esophagus     Benign intracranial hypertension     Cancer ([facility] [address] [facility]     renal     Carpal tunnel syndrome     Chronic pain     Depression     Depression     GERD (gastroesophageal reflux disease)     Headache(784.0)     History of blood transfusion     History of tobacco use     Quit 7/2014    Migraine     chronic for 1 year    Morbid obesity ([facility] [facility]     Movement disorder     Onychomycosis     Sleep apnea, obstructive     Severe uses cpap stop bang 6    Unspecified cerebral artery occlusion with cerebral infarction 2014    slight weakness in left arm    Wears dentures     full set    Wears glasses        Past Surgical History:    Past Surgical History:   Procedure Laterality Date    ABDOMEN SURGERY      gastric bypass    ABDOMEN SURGERY  4-7-2016    repair of recurrent incisional hernia with mesh, removal of old mesh, bilateral component separation    ABDOMINAL EXPLORATION SURGERY  1/11/16    exp lap, lysis of adhesions, small bowel resection    CARPAL TUNNEL RELEASE      bilat    DILATATION, ESOPHAGUS      ENDOSCOPY, COLON, DIAGNOSTIC      EYE SURGERY      GASTRIC BYPASS SURGERY  Jan 2009    Has lost about 200 pounds.     HERNIA REPAIR  3-    ventral    JOINT REPLACEMENT      KIDNEY REMOVAL Left 08/01/2018    KNEE ARTHROSCOPY Right 7/11/2013    Dr.Robert Sanders     KNEE ARTHROSCOPY Right 7/11/12    RIGHT KNEE ARTHROSCOPY WITH CHONDROPLASTY   Corby Rose KNEE SURGERY  July 2012 right, arthroscopy    KNEE SURGERY Right 2/18/2020    INCISION AND DRAINAGE RIGHT KNEE AND WOULD VAC PLACEMENT performed by Wen Self MD at [address]  2005    OTHER SURGICAL HISTORY Left 03/08/2016    CT biopsy ablasion left kidney    OTHER SURGICAL HISTORY  2016    small intestine 12 inch removed, 2 hernia surgeries, another surgery to drain infection from incision.  REVISION TOTAL KNEE ARTHROPLASTY Right 12/17/2019    RIGHT REVISION TIBIA TOTAL KNEE REPLACEMENT performed by Wen Self MD at [facility] Right 1/22/2020    RIGHT KNEE IRRIGATION AND DEBRIDEMENT WITH POLY EXCHANGE [facility]    RIGHT    TOTAL KNEE ARTHROPLASTY Right 8/12/2020    RIGHT ARTHROPLASY  RESECTION WITH INSERTION OF SPACER performed by Wen Self MD at [facility]  6-7-2016    UPPER GASTROINTESTINAL ENDOSCOPY  04/02/2018       Current Medications:    Outpatient Medications Marked as Taking for the 2/16/21 encounter [facility] Encounter)   Medication Sig Dispense Refill    aspirin 81 MG EC tablet Take 1 tablet by mouth daily for 14 days Take twice a day for 14 days after knee surgery then resume daily dosing. 28 tablet 0    HYDROcodone-acetaminophen (NORCO) 5-325 MG per tablet Take 1-2 tablets by mouth every 4 hours as needed for Pain for up to 5 days.  60 tablet 0    sildenafil (REVATIO) 20 MG tablet Take 4 tablets by mouth as needed (30 min prior to intercourse) 30 tablet 0    FLUoxetine (PROZAC) 20 MG capsule TAKE 1 CAPSULE BY MOUTH DAILY 90 capsule 1    traZODone (DESYREL) 100 MG tablet Take 2 tablets by mouth nightly 180 tablet 0    atorvastatin (LIPITOR) 40 MG tablet Take 1 tablet by mouth nightly At bedtime 90 tablet 0    dextrose 5 % SOLN 50 mL with ketamine 100 MG/ML SOLN 50 mg Infuse intravenously continuous  Quit date: 7/31/2017    Years since quitting: 3.5   Smokeless Tobacco Never Used      Family History   Problem Relation Age of Onset    Cancer Father         Lymphoma    Arthritis Mother     Heart Disease Maternal Uncle     Heart Disease Maternal Uncle     Diabetes Maternal Uncle          REVIEW OF SYSTEMS:    Constitutional:  negative for fevers, chills, night sweats  Eyes:  negative for blurred vision, eye discharge, visual disturbance   HEENT:  negative for hearing loss, ear drainage,nasal congestion  Respiratory:  negative for cough, shortness of breath or hemoptysis   Cardiovascular:  negative for chest pain, palpitations, syncope  Gastrointestinal:  negative for nausea, vomiting, diarrhea, constipation, abdominal pain  Genitourinary:  negative for frequency, dysuria, urinary incontinence, hematuria  Hematologic/Lymphatic:  negative for easy bruising, bleeding and lymphadenopathy  Allergic/Immunologic:  negative for recurrent infections, angioedema, anaphylaxis   Endocrine:  negative for weight changes, polyuria, polydipsia and polyphagia  Musculoskeletal:  Rt knee pain and instability+  Swelling, +  decreased range of motion  Integumentary: No rashes, skin lesions  Neurological:  negative for headaches, slurred speech, unilateral weakness  Psychiatric: negative for hallucinations,confusion,agitation.      PHYSICAL EXAM:      Vitals:    /78   Pulse 66   Temp 98.4 °F (36.9 °C) (Oral)   Resp 16   Ht 6' (1.829 m)   Wt 213 lb 13.5 oz (97 kg)   SpO2 99%   BMI 29.00 kg/m²     General Appearance: alert,in no acute distress, no pallor, no icterus   Skin: warm and dry, no rash or erythema  Head: normocephalic and atraumatic  Eyes: pupils equal, round, and reactive to light, conjunctivae normal  ENT: tympanic membrane, external ear and ear canal normal bilaterally, nose without deformity, nasal mucosa and turbinates normal without polyps  Neck: supple and non-tender without mass, no thyromegaly  no cervical lymphadenopathy  Pulmonary/Chest: clear to auscultation bilaterally- no wheezes, rales or rhonchi, normal air movement, no respiratory distress  Cardiovascular: normal rate, regular rhythm, normal S1 and S2, no murmurs, rubs, clicks, or gallops, no carotid bruits  Abdomen: soft, non-tender, non-distended, normal bowel sounds, no masses or organomegaly scar from previous  Surgery   Extremities: no cyanosis, clubbing or edema  Musculoskeletal: normal range of motion, no joint swelling, deformity or tenderness  Integumentary: No rashes, no abnormal skin lesions, no petechiae  Neurologic: reflexes normal and symmetric, no cranial nerve deficit  Psych:  Orientation, sensorium, mood normal   Lines: IV  Rt knee post op dressing+ ace wraps+     DATA:    CBC:   Lab Results   Component Value Date    WBC 4.9 02/09/2021    HGB 11.7 (L) 02/09/2021    HCT 36.4 (L) 02/09/2021    MCV 86.5 02/09/2021     02/09/2021     RENAL:   Lab Results   Component Value Date    CREATININE 1.2 02/09/2021    BUN 6 (L) 02/09/2021     02/09/2021    K 4.1 02/09/2021     02/09/2021    CO2 27 02/09/2021     SED RATE:   Lab Results   Component Value Date    SEDRATE 2 02/09/2021     CK:   Lab Results   Component Value Date    CKTOTAL 56 09/07/2020     CRP:   Lab Results   Component Value Date    CRP <3.0 02/09/2021     Hepatic Function Panel:   Lab Results   Component Value Date    ALKPHOS 87 02/02/2021    ALT 13 02/02/2021    AST 14 02/02/2021    PROT 6.4 02/02/2021    PROT 6.7 01/07/2015    BILITOT 0.3 02/02/2021    BILIDIR <0.2 02/18/2015    IBILI 0.3 02/18/2015    LABALBU 4.3 02/09/2021     UA:  Lab Results   Component Value Date    COLORU DK YELLOW 02/09/2021    CLARITYU CLOUDY 02/09/2021    GLUCOSEU Negative 02/09/2021    GLUCOSEU NEGATIVE 02/07/2011    BILIRUBINUR SMALL 02/09/2021    BILIRUBINUR NEGATIVE 02/07/2011    KETUA TRACE 02/09/2021    SPECGRAV 1.022 02/09/2021    BLOODU Negative 02/09/2021    PHUR 5.5 02/09/2021    PROTEINU Negative 02/09/2021    UROBILINOGEN 1.0 02/09/2021    NITRU Negative 02/09/2021    LEUKOCYTESUR SMALL 02/09/2021    LABMICR YES 02/09/2021    URINETYPE Cleancatch 02/09/2021      Urine Microscopic:   Lab Results   Component Value Date    COMU see below 02/09/2021    HYALCAST 1 02/09/2021    WBCUA 3 02/09/2021    RBCUA 5 02/09/2021    EPIU 0 02/09/2021     Urine Reflex to Culture:   Lab Results   Component Value Date    URRFLXCULT Not Indicated 02/09/2021       ESR  2  crp  < 0.3     8/12/2020  3:43 PM - Nechama Schirmer Incoming Lab Results From Soft (Epic Adt)    Component Value Ref Range & Units Status Collected Lab   Cell Count Fluid Type Knee   Right   Final 08/12/2020  1:38 PM [address] [facility]   Color, Fluid Yellow   Final 08/12/2020  1:38 PM [address] [facility]   Appearance, Fluid Cloudy   Final 08/12/2020  1:38 PM [address] [facility]   Clot Eval. see below   Final 08/12/2020  1:38 PM [address] [facility]   No Clots Seen   Madhuri Lewis, Fluid 20,808  /cumm Final 08/12/2020  1:38 PM [address] [facility]   RBC, Fluid 441  /cumm Final 08/12/2020  1:38 PM [address] [facility]   Neutrophil Count, Fluid 93  % Final 08/12/2020  1:38 PM [address] [facility]   Lymphocytes, Body Fluid 2  % Final 08/12/2020  1:38 PM [address] [facility]   Monocyte Count, Fluid 5  % Final 08/12/2020  1:38 PM [address] [facility]   Number of Cells Counted Fluid 100   Final 08/12/2020  1:38 PM [address] [facility]   Volume 5.0  mL Final 08/12/2020  1:38 PM [address] [facility]   Testing Performed By    Nomi Noel Name Director Address Valid Date Range   [facility] - Slade Angeles M.D., Ph.D. [address] [place] [id number] 08/30/17 0817-09/26/20 0000   Narrative  Performed by: [address] [facility]  Performed at:   [facility]        MICRO: cultures reviewed and updated by me   Time       Culture, Surgical radiographic evidence of   complication. 2. Approximately 2.5 cm angulated density along the medial aspect of the   medial femoral condyle of unclear etiology but not significantly changed   since prior exam of November 12, 2020. All pertinent images and reports for the current Hospitalization were reviewed by me. 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  Chondromalacia of patellofemoral joint    Chronic pain syndrome    History of stroke    Gastroesophageal reflux disease with esophagitis--on daily ppi    Intractable chronic migraine without aura and with status migrainosus    Iron deficiency anemia    B12 deficiency    Malignant neoplasm of left kidney (HCC) clear cell. 8/1/18 Dr Cheryl Andrew.     Failed total knee, right, initial encounter ([address]    Infection of total knee replacement ([address]    History of depression    History of alcoholism ([address]    Infection of prosthetic right knee joint ([address]    History of total knee arthroplasty, right     S/p Rt TKA revision on 2/16/21  Per OP notes no acute inflammation  OR cx from 2/16 in process and gram stain negative  H/O Rt TKA infection and s/p ID and explanation and 1 stage spacer placement by  back in Aug 2020  OR cx from Aug 2020 Negative  Completed full x 6 weeks of IV abx in Aug 2020 with good clinical response  H/o R TKA ID and LINER EXCHANGE 1/22/20    H/o Rt TKA  Broth cx +Ve for Corynebacterium in Jan 2020 and completed full x 6 weeks of IV abx back then   H/o Gastric by pass surgery   H/o lEFT Nephrectomy  H/o Splenectomy   H/o SBO and chronic pain from abdominal surgeries      Status post revision right total knee arthroplasty on 2/16/2021 per operative report there was no acute inflammation operative cultures are in process. Initial Gram stain negative. Preoperatively there was no open wound or drainage or cellulitis. He completed a long course of IV antibiotic back in August 2020 for right total knee infection. His inflammatory markers were normal preoperatively Given the complicated course with right total knee arthroplasty at this time is reasonable to continue oral antibiotic in the early postoperative until  the knee incision are closed and healed up well. As long as intraoperative culture remain negative do not see need for long-term IV antibiotic at this time. We will repeat inflammatory markers again in 4 weeks and follow-up. Labs, Microbiology, Radiology and pertinent results from current hospitalization and care every where were reviewed by me as a part of the consultation. PLAN :  1. Start Oral Doxycycline x 100 mg x q 12 hr x 4 weeks  2. Follow up on OR cx  3. ESR, CRP in 4 weeks  4. [id number] [address] for d/c planning per ortho  5. Hopefully OR cx remain negative and may not need any long term IV abx      Discussed with patient/Family and Nursing     Thanks for allowing me to participate in your patient's care please call me with any questions or concerns.     Dr. Ryan Corbett MD  [address] [facility] Physician  Phone: [phone]   Fax : [phone]

## 2021-02-16 NOTE — ANESTHESIA PRE PROCEDURE
[facility] Department of Anesthesiology  Pre-Anesthesia Evaluation/Consultation       Name:  Davy Lagunas  : 1970  Age:  53 y.o. MRN:  [id number]  Date: 2021           Surgeon: Surgeon(s):  Claus Sarah MD    Procedure: Procedure(s):  RIGHT REMOVAL OF EXPLANT AND TOTAL KNEE REPLACEMENT     Allergies   Allergen Reactions    Nsaids Nausea Only and Other (See Comments)     Hx of Barretts esophagus      Morphine Hives and Itching     Pt gets Hives/itching. Does not tolerate     Prochlorperazine Other (See Comments)     No allergic reaction, patient reported sense of \"restlessness\" and fidgiting    Valtrex [Valacyclovir Hcl] Diarrhea    Morphine And Related Hives and Itching    Tolmetin Nausea Only     Hx of Barretts esophagus     Patient Active Problem List   Diagnosis    Insomnia-chronic intermittent--uses prn ambien--to be filled by sleep    Vaughn esophagus-on daily ppi-last egd 10/20 Dr Kei Valdivia    History of tobacco useadvised to quit- quit 2014    Allergic rhinitis, seasonal    Obstructive sleep apnea,Severe -(on cpap)    Class 1 obesity due to excess calories with body mass index (BMI) of 34.0 to 34.9 in adult    Depression-on daily effexor xr--sees dr Echols Minus    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  Chondromalacia of patellofemoral joint    Chronic pain syndrome    History of stroke    Gastroesophageal reflux disease with esophagitis--on daily ppi    Intractable chronic migraine without aura and with status migrainosus    Iron deficiency anemia    B12 deficiency    Malignant neoplasm of left kidney (HCC) clear cell. 18 Dr Vania Morales.     Failed total knee, right, initial encounter [facility] [address]    Infection of total knee replacement ([address]    History of depression    History of alcoholism ([address]    Infection of prosthetic right knee joint ([address] Past Medical History:   Diagnosis Date    Alcoholism [facility])     last drink 2015    Allergic migraine with status migrainosus     Allergic rhinitis, seasonal     Anemia     Arthritis     right knee    Vaughn's esophagus     Benign intracranial hypertension     Cancer (HCC)     renal     Carpal tunnel syndrome     Chronic pain     Depression     Depression     GERD (gastroesophageal reflux disease)     Headache(784.0)     History of blood transfusion     History of tobacco use     Quit 7/2014    Migraine     chronic for 1 year    Morbid obesity ([address]     Movement disorder     Onychomycosis     Sleep apnea, obstructive     Severe uses cpap stop bang 6    Unspecified cerebral artery occlusion with cerebral infarction 2014    slight weakness in left arm    Wears dentures     full set    Wears glasses      Past Surgical History:   Procedure Laterality Date    ABDOMEN SURGERY      gastric bypass    ABDOMEN SURGERY  4-7-2016    repair of recurrent incisional hernia with mesh, removal of old mesh, bilateral component separation    ABDOMINAL EXPLORATION SURGERY  1/11/16    exp lap, lysis of adhesions, small bowel resection    CARPAL TUNNEL RELEASE      bilat    DILATATION, ESOPHAGUS      ENDOSCOPY, COLON, DIAGNOSTIC      EYE SURGERY      GASTRIC BYPASS SURGERY  Jan 2009    Has lost about 200 pounds.     HERNIA REPAIR  3-    ventral    JOINT REPLACEMENT      KIDNEY REMOVAL Left 08/01/2018    KNEE ARTHROSCOPY Right 7/11/2013    Dr.Robert Sanders     KNEE ARTHROSCOPY Right 7/11/12    RIGHT KNEE ARTHROSCOPY WITH CHONDROPLASTY    KNEE SURGERY  July 2012    right, arthroscopy    KNEE SURGERY Right 2/18/2020    INCISION AND DRAINAGE RIGHT KNEE AND WOULD VAC PLACEMENT performed by Odessa Mueller MD at [address]  2005    OTHER SURGICAL HISTORY Left 03/08/2016    CT biopsy ablasion left kidney    OTHER SURGICAL HISTORY  2016 small intestine 12 inch removed, 2 hernia surgeries, another surgery to drain infection from incision.  REVISION TOTAL KNEE ARTHROPLASTY Right 12/17/2019    RIGHT REVISION TIBIA TOTAL KNEE REPLACEMENT performed by Delia Garza MD at [address] [facility] Right 1/22/2020    RIGHT KNEE IRRIGATION AND DEBRIDEMENT WITH POLY EXCHANGE [facility] [address] [facility] [address]  [facility]    RIGHT    TOTAL KNEE ARTHROPLASTY Right 8/12/2020    RIGHT ARTHROPLASY  RESECTION WITH INSERTION OF SPACER performed by Delia Garza MD at [address]  6-7-2016    UPPER GASTROINTESTINAL ENDOSCOPY  04/02/2018     Social History     Tobacco Use    Smoking status: Former Smoker     Packs/day: 0.50     Years: 25.00     Pack years: 12.50     Types: Cigarettes     Quit date: 7/31/2017     Years since quitting: 3.5    Smokeless tobacco: Never Used   Substance Use Topics    Alcohol use: No     Alcohol/week: 0.0 standard drinks     Comment: last drink 2015    Drug use: Never     Medications  No current facility-administered medications on file prior to encounter.       Current Outpatient Medications on File Prior to Encounter   Medication Sig Dispense Refill    aspirin 81 MG EC tablet Take 81 mg by mouth daily      sildenafil (REVATIO) 20 MG tablet Take 4 tablets by mouth as needed (30 min prior to intercourse) 30 tablet 0    FLUoxetine (PROZAC) 20 MG capsule TAKE 1 CAPSULE BY MOUTH DAILY 90 capsule 1    traZODone (DESYREL) 100 MG tablet Take 2 tablets by mouth nightly 180 tablet 0    atorvastatin (LIPITOR) 40 MG tablet Take 1 tablet by mouth nightly At bedtime 90 tablet 0    dextrose 5 % SOLN 50 mL with ketamine 100 MG/ML SOLN 50 mg Infuse intravenously continuous Indications: 350 mg once every 3 months      pantoprazole (PROTONIX) 40 MG tablet Take 1 tablet by mouth 2 times daily  calcium-vitamin D (OSCAL 500/200 D-3) 500-200 MG-UNIT per tablet Take 1 tablet by mouth 2 times daily       Multiple Vitamin TABS Take 1 tablet by mouth daily       acetaminophen (TYLENOL) 500 MG tablet Take 2 tablets by mouth 3 times daily (with meals) (Patient taking differently: Take 1,000 mg by mouth 3 times daily as needed ) 42 tablet 0     Current Facility-Administered Medications   Medication Dose Route Frequency Provider Last Rate Last Admin    0.9 % sodium chloride infusion   Intravenous Continuous Jessica Maria MD        sodium chloride flush 0.9 % injection 10 mL  10 mL Intravenous 2 times per day Jessica Maria MD        sodium chloride flush 0.9 % injection 10 mL  10 mL Intravenous PRN Jessica Maria MD        ceFAZolin (ANCEF) 2000 mg in dextrose 5 % 100 mL IVPB  2,000 mg Intravenous Once Erna Bingham MD        oxyCODONE HCl (OXY-IR) immediate release tablet 10 mg  10 mg Oral Once Erna Bingham MD        tranexamic acid (CYCLOKAPRON) 1000 mg in sodium chloride 0.9 % 50 mL IVPB  1,000 mg Intravenous Once Erna Bingham MD        vancomycin (VANCOCIN) 1,750 mg in dextrose 5 % 500 mL IVPB  1,750 mg Intravenous On Call to Juanito Moe MD         Vital Signs (Current)   Vitals:    02/10/21 1236   Weight: 230 lb (104.3 kg)   Height: 6' (1.829 m)                                          BP Readings from Last 3 Encounters:   02/11/21 107/63   01/26/21 120/74   10/26/20 113/72     Vital Signs Statistics (for past 48 hrs)     No data recorded  BP Readings from Last 3 Encounters:   02/11/21 107/63   01/26/21 120/74   10/26/20 113/72       BMI  Body mass index is 31.19 kg/m². Estimated body mass index is 31.19 kg/m² as calculated from the following:    Height as of this encounter: 6' (1.829 m). Weight as of this encounter: 230 lb (104.3 kg).     CBC   Lab Results   Component Value Date    WBC 4.9 02/09/2021    RBC 4.21 02/09/2021    RBC 4.94 01/07/2015    HGB 11.7 02/09/2021 HCT 36.4 02/09/2021    MCV 86.5 02/09/2021    RDW 17.3 02/09/2021     02/09/2021     CMP    Lab Results   Component Value Date     02/09/2021    K 4.1 02/09/2021    K 3.7 10/23/2020     02/09/2021    CO2 27 02/09/2021    BUN 6 02/09/2021    CREATININE 1.2 02/09/2021    GFRAA >60 02/09/2021    AGRATIO 1.9 02/02/2021    LABGLOM >60 02/09/2021    GLUCOSE 69 02/09/2021    GLUCOSE 84 01/07/2015    PROT 6.4 02/02/2021    PROT 6.7 01/07/2015    CALCIUM 9.3 02/09/2021    BILITOT 0.3 02/02/2021    ALKPHOS 87 02/02/2021    AST 14 02/02/2021    ALT 13 02/02/2021     BMP    Lab Results   Component Value Date     02/09/2021    K 4.1 02/09/2021    K 3.7 10/23/2020     02/09/2021    CO2 27 02/09/2021    BUN 6 02/09/2021    CREATININE 1.2 02/09/2021    CALCIUM 9.3 02/09/2021    GFRAA >60 02/09/2021    LABGLOM >60 02/09/2021    GLUCOSE 69 02/09/2021    GLUCOSE 84 01/07/2015     POCGlucose  No results for input(s): GLUCOSE in the last 72 hours.    Coags    Lab Results   Component Value Date    PROTIME 13.4 02/09/2021    INR 1.15 02/09/2021    APTT 37.6 84/54/3525     HCG (If Applicable) No results found for: PREGTESTUR, PREGSERUM, HCG, HCGQUANT   ABGs No results found for: PHART, PO2ART, CKR9HAP, MRA8BJN, BEART, E7CNVMAB   Type & Screen (If Applicable)  No results found for: LABABO, LABRH                         BMI: Wt Readings from Last 3 Encounters:       NPO Status:                          Anesthesia Evaluation  Patient summary reviewed no history of anesthetic complications:   Airway: Mallampati: II        Dental:    (+) upper dentures and lower dentures      Pulmonary:   (+) sleep apnea: on CPAP,                             Cardiovascular:    (+) hyperlipidemia    (-) valvular problems/murmurs                Neuro/Psych:   (+) CVA:, neuromuscular disease:, headaches: migraine headaches, psychiatric history:            GI/Hepatic/Renal:   (+) GERD:, PUD,      (-) no renal disease and bowel prep Endo/Other:    (+) blood dyscrasia: anemia:., .                 Abdominal:   (+) obese,         Vascular:     - DVT and PE. Anesthesia Plan      general and regional     ASA 3       Induction: intravenous. MIPS: Postoperative opioids intended and Prophylactic antiemetics administered. Anesthetic plan and risks discussed with patient. Plan discussed with CRNA. Attending anesthesiologist reviewed and agrees with Pre Eval content              This pre-anesthesia assessment may be used as a history and physical.    DOS STAFF ADDENDUM:    Pt seen and examined, chart reviewed (including anesthesia, drug and allergy history). No interval changes to history and physical examination. Anesthetic plan, risks, benefits, alternatives, and personnel involved discussed with patient. Patient verbalized an understanding and agrees to proceed.       Asia Piña MD  February 16, 2021  6:24 AM

## 2021-02-16 NOTE — PROGRESS NOTES
Met with patient and family wife at bedside, patient is alert and oriented x4. discussed role of nurse navigator and gave contact information. Reviewed reasons to call with questions or concerns, importance of TEDS, Incentive spirometer, pain medication, and physical and occupational therapy. 2/4 bed rails up, bed in lowest position, fall precautions in place, call light within reach. Pulses present bilaterally +2 pedal, no drainage or odor noted at surgical dressing right knee. ace Dressing clean, dry, and intact. Ice in place. Luca and scds on LLE. Neurovascular checks performed and WNLs, patient denies numbness or tingling. DC Plan: home with [facility] and wife and mother. mother to transport patient.   DME needs:[employer]    Ney Palomino  Orthopedic Nurse Navigator  Phone number: [phone]    Future Appointments   Date Time Provider Timothy Beal   3/1/2021 10:00 AM Duke Langley MD [facility]   3/19/2021 12:20 PM LILLIE Mcdonald CNP  SLEEP MED [facility]     Electronically signed by Ronald Krause RN on 2/16/2021 at 11:57 AM

## 2021-02-16 NOTE — PROGRESS NOTES
Pathology, Chapito Fleming, stated that \"with the frozen section, right knee tissue, there was NO evidence of acute inflammation\". This was  conveyed to Dr. Rola Peres.

## 2021-02-16 NOTE — PROGRESS NOTES
Statement of medical necessity    This patient has been admitted as an inpatient postoperatively following a revision right total knee arthroplasty procedure. He has a history of right total knee arthroplasty prosthetic joint infection and has undergone irrigation and debridement and removal of components with placement of a temporary spacer in the past.  He returned today for the second stage of the procedure which included removal of temporary spacer and placement of revision total knee arthroplasty components. He will be admitted postoperatively for pain control, physical therapy, occupational therapy, monitoring of hemoglobin, and infectious disease consult given his history of infection. Based on the infectious disease consult he may need short-term versus long-term IV or oral antibiotics.     Loni Haile MD  2/16/2021

## 2021-02-16 NOTE — OP NOTE
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, status post irrigation, debridement, temporary spacer     POSTOPERATIVE DIAGNOSIS:   Same     OPERATION PERFORMED:   Revision right total knee arthroplasty     SURGEON: Nayeli Pierson MD     ASSISTANT: JEMAL Grace     EBL: 200 mL     IMPLANTS:  [employer] Triathlon TS femur, size 5 with 5mm medial and 10mm lateral distal augments, medial and lateral 5mm posterior augments, and a 15 x 100 mm stem   Size 5 femoral cone  [place] Triathlon universal tibial baseplate, size 5 with a 15 x 50 mm stem  Size D tibial cone  25mm TS polyethylene  32mm asymmetric patella     INDICATIONS:  The patient is a 53 y.o. male who presents for revision right knee replacement. He has undergone incision, drainage, explant of a right total knee arthroplasty in the past for infection. He has a temporary knee spacer in place. He has undergone preoperative assessment with aspiration and lab values and there are no signs of infection. We discussed options including nonoperative versus operative management and I recommended operative revision. The operative procedure, alternatives, and risks were discussed in detail with the patient. Informed consent for surgery was signed.     OPERATIVE PROCEDURE:  The patient was brought to the operating room.  Once anesthetic was obtained and intravenous antibiotics delivered, the knee was prepped and draped in a sterile fashion.  The leg was exsanguinated.  Tourniquet was placed to 300 mmHg around the right thigh.    An anterior midline incision was made over the knee following his old incision. Skin and subcutaneous tissue were divided down to the extensor mechanism.  A medial parapatellar arthrotomy was performed. The knee was fully exposed and a medial release was performed. An extensive synovectomy was performed. Synovial tissue was sent to pathology for frozen section. This would later come back negative for acute inflammation. Tissue was also sent to the microbiology lab for culture.     Once the knee was exposed, the bone-cement interface of the femoral component was disrupted and the femoral component was removed. Tissue was debrided from around the tibial baseplate and the bone-cement interface was disrupted. The tibial component was then removed. Any remaining cement and synovial tissue was debrided.     The rigid reamers for the revision knee system were then used to ream both the tibia and the femur. The tibia was reamed up to size 17 mm. A cleanup cut of the tibial surface was then made based off this intramedullary guide. A size 5 tibial baseplate was then used as a trial. This was well fitting. This was pinned into place and the tibial punch was used to set the position of flanges. The baseplate was then removed and a cone reamer was used to ream for metaphyseal cone. This was reamed up to a size D cone. A size D cone trial was placed in the tibia followed by the trial tibial baseplate. Attention was then returned to the femur and this was reamed up to his 20 mm. A cleanup cut of the distal femur was then performed based on this intramedullary guide. A cutting block was then inserted in the proper external rotation to achieve balanced gaps. This was pinned into place and clean up cuts were made as needed. A box cut was also made based off of this guide. The jig was then removed. A cone reamer was used on the femur to ream up to a size 5 femoral cone. The intramedullary reamer was then removed. The trial size 5 femoral cone and a trial size 5 femoral component with a stem on it were placed. The trial femoral component included a 10 mm lateral distal augments, 5 millimeter medial distal augment, and 5 mm medial and lateral posterior augments. A trial polyethylene liner was then placed. A cleanup cut was made at the patella and was sized for a 32 mm asymmetric patellar button. 3 holes were drilled into the patella to accept a 32 mm trial patellar button.  The knee was taken through range of motion was found to be stable with equally balanced flexion and extension gaps.    All trial components were then removed and the knee was thoroughly irrigated and prepared for cementation. Cement restrictor plugs were placed in the femoral and tibial canals. The femoral and tibial metaphyseal cones were then placed. Cement was placed into the tibia followed by placement of the actual size 5 tibial baseplate with a 15 x 50 mm stem. This was impacted into place and excess cement was removed. Cement was then placed into the femoral canal followed by placement of a size 5 TS femur with the same augments as on the trial and a 15 x 100 mm stem. This was impacted into place and any excess cement was removed. A trial liner was then placed and the knee was taken into full extension and compressed. Any excess cement was then removed. The patella was then irrigated and dried. A 32 mm all polyethylene asymmetric patella button was placed and compressed. Any excess cement was removed. Once the cement hardened, any excess cement was removed and a 25 mm trial polyethylene was inserted and taken through range of motion. The knee came to full extension, excellent flexion and good overall stability.  The patella tracked within the trochlea of the femur. The real 25 mm TS polyethylene liner was then placed followed by a post in the central peg. The tourniquet was deflated and hemostasis was obtained.  The wound was then thoroughly irrigated.      The wound was injected with 100 mL of ropivacaine, morphine, cefuroxime, and methylprednisolone.  It was also bathed with aqueous iodine.  The wound then was closed in layers.  The patient tolerated the procedure well.  A dressing was applied, and he was brought to the recovery room in good condition.     Lori Krueger MD  2/16/2021

## 2021-02-16 NOTE — ANESTHESIA PROCEDURE NOTES
Peripheral Block    Patient location during procedure: pre-op  Start time: 2/16/2021 7:03 AM  End time: 2/16/2021 7:03 AM  Staffing  Performed: anesthesiologist   Anesthesiologist: Antonio Santamaria MD  Preanesthetic Checklist  Completed: patient identified, IV checked, site marked, risks and benefits discussed, surgical consent, monitors and equipment checked, pre-op evaluation, timeout performed, anesthesia consent given, oxygen available and patient being monitored  Peripheral Block  Patient position: supine  Prep: ChloraPrep  Patient monitoring: continuous pulse ox and IV access  Block type: Saphenous  Laterality: right  Injection technique: single-shot  Guidance: ultrasound guided  Provider prep: mask  Assessment  Injection assessment: negative aspiration for heme, local visualized surrounding nerve on ultrasound and no paresthesia on injection  Paresthesia pain: none  Slow fractionated injection: yes  Hemodynamics: stable  Additional Notes  placement of 30 ml of 0.5% Ropivacaine superolateral to the right femoral artery deep to the sartorius muscle under dynamic ultrasound guidance with fractionated injection. No complications. No blood loss.    Medications Administered  Ropivacaine (NAROPIN) injection 0.5%, 30 mL  Reason for block: post-op pain management and at surgeon's request

## 2021-02-16 NOTE — PROGRESS NOTES
Pt arrived to floor from PACU at 1110 via stretcher. Pt oriented to room, call light, policies and procedures, the menu and ordering. Pt given PRN analgesic for pain rated 7/10. Call light within reach. Bed in lowest position, bed alarm on, and wheels locked. Pt verbalized understanding. No complaints, questions or concerns at this time.   Electronically signed by Tawanda Rosales RN on 2/16/2021 at 11:40 AM

## 2021-02-16 NOTE — PROGRESS NOTES
Physical Therapy    Facility/Department: [address] ORTHOPEDICS  Initial Assessment  This note serves as patient discharge summary if pt discharges prior to next PT visit      NAME: Gloria Ball  : 1970  MRN: [id number]    Date of Service: 2021    Discharge Recommendations:  S Level 1, 24 hour supervision or assist   Gloria Ball scored a 20/24 on the AM-PAC short mobility form. Current research shows that an AM-PAC score of 18 or greater is typically associated with a discharge to the patient's home setting. Based on the patient's AM-PAC score and their current functional mobility deficits, it is recommended that the patient have 2-3 sessions per week of Physical Therapy at d/c to increase the patient's independence. At this time, this patient demonstrates the endurance and safety to discharge home with home therapy services and a follow up treatment frequency of 2-3x/wk. Please see assessment section for further patient specific details. PT Equipment Recommendations  Equipment Needed: No    Assessment   Body structures, Functions, Activity limitations: Decreased functional mobility ; Decreased safe awareness;Decreased ROM; Increased pain  Assessment: Prior to R TKR revision 2021 via Dr. Curt Trinidad, patient reportedly lived in home with wife, and was independent in all functional mobility, using cane as needed for gait. Status 2021: Bed mobility SBA, transfers CGA, RW amb 21' CGA with Good tolerance but Fair quality (patient min impulsive with mobility). Patient familiar with general recovery expectations from multiple prior knee procedures. Anticipate patient will be ok for DC to home with family support and home PT level 1. He reports having RW at home.   Treatment Diagnosis: Impaired functional mobility  Prognosis: Good  Decision Making: Medium Complexity  History: as noted  Clinical Presentation: Evolving PT Education: Goals;PT Role;Plan of Care;General Safety;Transfer Training;Gait Training  Patient Education: importance of positoining in max degree of extension. REQUIRES PT FOLLOW UP: Yes  Activity Tolerance  Activity Tolerance: Patient Tolerated treatment well;Patient limited by fatigue(Falls lightly asleep during breaks.)  Activity Tolerance: 1 L O2 initially. Sats 98%, HR 88. O2 removed. Dstd 95%,  following bathroom and amb to BS chair. 94% HR 94 at end of session. Patient Diagnosis(es): The primary encounter diagnosis was History of total knee arthroplasty, right. A diagnosis of Prosthetic joint infection, initial encounter [facility]) was also pertinent to this visit. has a past medical history of Alcoholism ([address] Allergic migraine with status migrainosus, Allergic rhinitis, seasonal, Anemia, Arthritis, Vaughn's esophagus, Benign intracranial hypertension, Cancer ([address] Carpal tunnel syndrome, Chronic pain, Depression, Depression, GERD (gastroesophageal reflux disease), Headache(784.0), History of blood transfusion, History of tobacco use, Migraine, Morbid obesity ([address] Movement disorder, Onychomycosis, Sleep apnea, obstructive, Unspecified cerebral artery occlusion with cerebral infarction, Wears dentures, and Wears glasses. has a past surgical history that includes Gastric bypass surgery (Jan 2009); Splenectomy; LASIK (2005); knee surgery (July 2012); Carpal tunnel release; laparotomy; Knee arthroscopy (Right, 7/11/2013); Knee arthroscopy (Right, 7/11/12); Total knee arthroplasty (10/15/13); Endoscopy, colon, diagnostic; Dilatation, esophagus; eye surgery; joint replacement; Abdominal exploration surgery (1/11/16); other surgical history (Left, 03/08/2016); hernia repair (3-); Upper gastrointestinal endoscopy (6-7-2016); other surgical history (2016); Upper gastrointestinal endoscopy (04/02/2018); Kidney removal (Left, 08/01/2018); Abdomen surgery; Abdomen surgery (4-7-2016); Revision total knee arthroplasty (Right, 12/17/2019); Revision total knee arthroplasty (Right, 1/22/2020); knee surgery (Right, 2/18/2020); and Total knee arthroplasty (Right, 8/12/2020). Restrictions  Restrictions/Precautions  Restrictions/Precautions: Fall Risk, Weight Bearing  Lower Extremity Weight Bearing Restrictions  Right Lower Extremity Weight Bearing: Weight Bearing As Tolerated     Vision/Hearing  Vision: Impaired  Vision Exceptions: Wears glasses at all times  Hearing: Within functional limits       Subjective  General  Chart Reviewed: Yes  Patient assessed for rehabilitation services?: Yes  Additional Pertinent Hx: 54 yo male  with history of R Total Knee infection, to hospital 2- for Revision R TKR via Dr. Tara Vanessa. WBAT. PMHx as noted including prior knee replacement, poly exchange, wound vac. Response To Previous Treatment: Not applicable  Family / Caregiver Present: Yes(wife)  Referring Practitioner: Tara Vanessa MD  Referral Date : 02/16/21  Subjective  Subjective: Patient in bed. Awake. Agreeable to therapy. Reports R Knee pain at 4/10 at rest; 7/10 during ambulation.   Pain Screening  Patient Currently in Pain: Yes    Orientation  Orientation Overall Orientation Status: Within Normal Limits(slightly sleepy during rest breaks.)     Social/Functional History  Social/Functional History  Lives With: Spouse, Son, Daughter  Type of Home: House  Home Layout: One level, Able to Live on Main level with bedroom/bathroom  Home Access: Stairs to enter with rails, Stairs to enter without rails  Entrance Stairs - Number of Steps: 3 in front no rails. 5 in back bilateral rails  Bathroom Shower/Tub: Walk-in shower  Bathroom Toilet: Standard  Bathroom Equipment: Shower chair, Hand-held shower  Bathroom Accessibility: Walker accessible  Home Equipment: Rolling walker, Cane  ADL Assistance: Independent  Homemaking Assistance: Independent  Ambulation Assistance: Independent(cane)  Leisure & Hobbies: plays piano  Additional Comments: Fall in parking lot getting into car with R knee buckling Nov 2020 resulting in L wrist fracture- current limitation is lifting with LUE     Cognition   Cognition  Safety Judgement: Decreased awareness of need for safety  Insights: Decreased awareness of deficits  Cognition Comment: moderately impulsive    Objective  AROM RLE (degrees)  RLE AROM: WFL  RLE General AROM: R knee grossly 12-60. AROM LLE (degrees)  LLE AROM : WFL  Strength RLE  Comment: functionally 3+/5  Strength LLE  Strength LLE: WNL  Sensation  Overall Sensation Status: WNL  Bed mobility  Supine to Sit: Stand by assistance(Exiting to his R. HOB up, 1 rail)  Sit to Supine: Unable to assess(in BS chair at end of transfers)  Transfers  Sit to Stand: Contact guard assistance(Good technique)  Stand to sit: Contact guard assistance(Good technique)  Ambulation  Ambulation?: Yes  Ambulation 1  Surface: level tile  Device: Rolling Walker  Assistance: Contact guard assistance  Quality of Gait: Cues to keep melina controlled vs fast. Foot flat bearing through R LE, with knee in ~20 degrees flexion. Fair positioning at  walker. Sets  walker aside as he approaches sink to wash hands. Distance: 10', 24'  Comments: Patient stands at commode and urinates. Stands at sink to wash hands following. Both with CGA. Balance  Posture: Good  Sitting - Static: Good  Sitting - Dynamic: Good  Standing - Static: Good;-(at RW)  Standing - Dynamic: Good;-(@ RW)  Exercises  Ankle Pumps: x 5 B  Comments: Written TKR HEP provided to patient 2- for review 2-. Plan   Plan  Times per week: 1-4 visits as needed  Current Treatment Recommendations: Functional Mobility Training, Transfer Training, Gait Training, Stair training, Home Exercise Program, Safety Education & Training, Patient/Caregiver Education & Training  Safety Devices  Type of devices: Call light within reach, Chair alarm in place, Gait belt, Left in chair, Nurse notified(ortho RN [employer] informed)    Goals  Short term goals  Time Frame for Short term goals: By acute DC  Short term goal 1: Transfers SBA  Short term goal 2: Gait wh walker 50' SBA  Short term goal 3: Stairs as needed for home, SBA-CGA and cues  Short term goal 4: Tolerates 5-10 reps each of initial TKR HEP exs. Patient Goals   Patient goals : \"To walk on a stable (R) leg. \"    Therapy Time   Individual Concurrent Group Co-treatment   Time In 1320         Time Out 1408         Minutes [address]  Electronically signed by Agusto Claudio, [address] [place] [address] Drive [phone])  on 2/16/2021 at 2:30 PM

## 2021-02-16 NOTE — BRIEF OP NOTE
FEM SZ 5 THK5MM POST KNEE CO CHROM TOT STBL REV  [employer] [facility] [id number] Right 1 Implanted   STEM TIB L50MM [id number] CO CHROM TOT STBL LANI TRIATHLON  STEM TIB L50MM [id number] CO CHROM TOT STBL LANI TRIATHLON  [employer] [facility] [id number] Right 1 Implanted   AUGMENT FEM SZ 5 [id number] R DST KNEE CO CHROM TOT STBL FULL  AUGMENT FEM SZ 5 [id number] R DST KNEE CO CHROM TOT STBL FULL  [employer] [facility] [id number] Right 1 Implanted   AUGMENT FEM SZ 5 THK5MM R DST KNEE CO CHROM TOT STBL FULL  AUGMENT FEM SZ 5 THK5MM R DST KNEE CO CHROM TOT STBL FULL  [employer] [facility] [id number] Right 1 Implanted   AUGMENT FEM CONE SZ 5 RT CTRL KNEE TRITANIUM TRIATHLON  AUGMENT FEM CONE SZ 5 RT CTRL KNEE TRITANIUM TRIATHLON  [employer]O [facility] [id number] Right 1 Implanted   AUGMENT TIB SZ D KNEE TRITANIUM SYMMETRICAL CONE REV  AUGMENT TIB SZ D KNEE TRITANIUM SYMMETRICAL CONE REV  [employer] [facility] [id number] Right 1 Implanted   IMPLANT PAT [id number] X3 ASYM TRIATHLON  IMPLANT PAT [id number] X3 ASYM TRIATHLON  [employer] [facility] [id number] Right 1 Implanted   STEM TIB L100MM [id number] KNEE CO CHROM LANI TOT STBL AND  STEM TIB L100MM [id number] KNEE CO CHROM LANI TOT STBL AND  [employer] [facility] [id number] Right 1 Implanted   COMPONENT FEM SZ 5 R KNEE TOT STBL TRIATHLON  COMPONENT FEM SZ 5 R KNEE TOT STBL TRIATHLON  [employer] [facility] [id number] Right 1 Implanted   BASEPLATE TIB SZ 5 UNIV KNEE TRITANIUM TOT STBL LANI  BASEPLATE TIB SZ 5 UNIV KNEE TRITANIUM TOT STBL LANI  [employer] [facility] [id number] Right 1 Implanted   CEMENT BNE RADIOPAQUE FAST SET ACRYL RESIN HI VISC [id number]  CEMENT BNE RADIOPAQUE FAST SET ACRYL RESIN HI VISC SIMPLEXHV  [employer] [facility] [id number] Right 1 Implanted   INSERT TIB SZ 5 [id number] KNEE X3 TOT STBL + TRIATHLON  INSERT TIB SZ 5 [id number] KNEE X3 TOT STBL + TRIATHLON  [employer] [id number] Right 1 Implanted         Drains: * No LDAs found *    Findings: no acute inflammation    Electronically signed by Rolly Flores MD on 2/16/2021 at 9:37 AM

## 2021-02-16 NOTE — PROGRESS NOTES
Assessment: Pt is a 52 yo male admitted 2/16 sp revision R TKA with R arthroplasty resection with insertion of spacer August 2020. Pt with original R TKA in 2013, Revision in 12/2019, I&D and polyexchange in Jan 2020, and in Feb had I& D and wound vac placement. PTA, pt independent with ADLs, shares IADLs, driving, and mobility occasionally with a cane. Today, pt with increased R knee pain with mobility, however decreases at rest. Pt moderately impulsive with fxl mobility, however, good safety awareness with transfers. Pt completes LB dressing, toileting, and grooming at sink with CGA. Krystin acute OT services to address above deficits. d/c rec home with wife for 24 hr sup/assist and HHOT 2-3x/week  Treatment Diagnosis: Revision R TKA  Prognosis: Good  Decision Making: Low Complexity  OT Education: OT Role;Plan of Care;Transfer Training  REQUIRES OT FOLLOW UP: Yes  Activity Tolerance  Activity Tolerance: Patient Tolerated treatment well  Activity Tolerance: O2 stable throughout session with NC off. Heart rate increased to 117 after standing/mobility and recovers to 94 at [address] in place: Yes  Type of devices: Nurse notified; Left in chair;Patient at risk for falls;Gait belt; Chair alarm in place;Call light within reach         Patient Diagnosis(es): The primary encounter diagnosis was History of total knee arthroplasty, right. A diagnosis of Prosthetic joint infection, initial encounter [facility]) was also pertinent to this visit. has a past medical history of Alcoholism ([facility] Allergic migraine with status migrainosus, Allergic rhinitis, seasonal, Anemia, Arthritis, Vaughn's esophagus, Benign intracranial hypertension, Cancer ([address] Carpal tunnel syndrome, Chronic pain, Depression, Depression, GERD (gastroesophageal reflux disease), Headache(784.0), History of blood transfusion, History of tobacco use, Migraine, Morbid obesity ([address] Movement disorder, Onychomycosis, Sleep apnea, obstructive, Unspecified cerebral artery occlusion with cerebral infarction, Wears dentures, and Wears glasses. has a past surgical history that includes Gastric bypass surgery (Jan 2009); Splenectomy; LASIK (2005); knee surgery (July 2012); Carpal tunnel release; laparotomy; Knee arthroscopy (Right, 7/11/2013); Knee arthroscopy (Right, 7/11/12); Total knee arthroplasty (10/15/13); Endoscopy, colon, diagnostic; Dilatation, esophagus; eye surgery; joint replacement; Abdominal exploration surgery (1/11/16); other surgical history (Left, 03/08/2016); hernia repair (3-); Upper gastrointestinal endoscopy (6-7-2016); other surgical history (2016); Upper gastrointestinal endoscopy (04/02/2018); Kidney removal (Left, 08/01/2018); Abdomen surgery; Abdomen surgery (4-7-2016); Revision total knee arthroplasty (Right, 12/17/2019); Revision total knee arthroplasty (Right, 1/22/2020); knee surgery (Right, 2/18/2020); and Total knee arthroplasty (Right, 8/12/2020).     Treatment Diagnosis: Revision R TKA      Restrictions  Restrictions/Precautions  Restrictions/Precautions: Fall Risk, Weight Bearing  Lower Extremity Weight Bearing Restrictions  Right Lower Extremity Weight Bearing: Weight Bearing As Tolerated    Subjective   General  Chart Reviewed: Yes  Patient assessed for rehabilitation services?: Yes Additional Pertinent Hx: Pt admitted 2/16 for elective Revision R TKA revision- with R arthroplasty resection with insertion of spacer August 2020. Pt with original R TKA in 2013, Revision in 12/2019, I&D and polyexchange in Jan 2020, and in Feb had I& D and wound vac placement. Family / Caregiver Present: Yes(wife)  Referring Practitioner: Viola Mireles MD  Diagnosis: Revision R TKA  Subjective  Subjective: Pt agreeable and cooperative with OT eval. Pt with 4/10 RLE pain at rest and 7/10 with mobility. Pt with moderate impulsivity with fxl mobilitty  General Comment  Comments: RN ok to eval    Social/Functional History  Social/Functional History  Lives With: Spouse, Son, Daughter  Type of Home: House  Home Layout: One level, Able to Live on Main level with bedroom/bathroom  Home Access: Stairs to enter with rails, Stairs to enter without rails  Entrance Stairs - Number of Steps: 3 in front no rails.  5 in back bilateral rails  Bathroom Shower/Tub: Walk-in shower  Bathroom Toilet: Standard  Bathroom Equipment: Shower chair, Hand-held shower  Bathroom Accessibility: Walker accessible  Home Equipment: Rolling walker, Cane  ADL Assistance: Independent  Homemaking Assistance: Independent  Ambulation Assistance: Independent(cane)  Leisure & Hobbies: plays piano  Additional Comments: Fall in parking lot getting into car with R knee buckling Nov 2020 resulting in L wrist fracture- current limitation is lifting with LUE     Objective   Vision: Impaired  Vision Exceptions: Wears glasses at all times  Hearing: Within functional limits    Orientation  Orientation Level: Oriented X4  Observation/Palpation  Posture: Good  Balance  Sitting Balance: Modified independent   Standing Balance: Contact guard assistance(RW)  Standing Balance  Time: 3 minutes + 2 minutes  Activity: toileting while standing + grooming at sink with CGA without use of RW to steadying  Functional Mobility  Functional - Mobility Device: Rolling Walker Activity: To/from bathroom(bed>toilet>chair)  Assist Level: Contact guard assistance  Functional Mobility Comments: Pt with moderate impulsivity with room mobility with moderate cues for slowing pace  Toilet Transfers  Toilet - Technique: Ambulating(RW)  Equipment Used: Standard toilet(standing)  Toilet Transfer: Contact guard assistance  Toilet Transfers Comments: minimal unsteadiness, no LOB  ADL  Grooming: Contact guard assistance(standing at sink)  LE Dressing: Contact guard assistance(donning underpants)  Toileting: Contact guard assistance(standing to urinate)  Additional Comments: Anticipate CGA for bathing based on performance in other ADLs and dynamic standing balance  Tone RUE  RUE Tone: Normotonic  Tone LUE  LUE Tone: Normotonic  Coordination  Movements Are Fluid And Coordinated: Yes     Bed mobility  Supine to Sit: Stand by assistance(Exiting to his R. HOB up, 1 rail)  Sit to Supine: Unable to assess(in BS chair at end of transfers)  Transfers  Sit to stand: Contact guard assistance  Stand to sit: Contact guard assistance  Transfer Comments: RW. no cues for hand placement     Cognition  Safety Judgement: Decreased awareness of need for safety  Insights: Decreased awareness of deficits  Cognition Comment: moderately impulsive     Sensation  Overall Sensation Status: WNL      LUE AROM (degrees)  LUE AROM : WNL  RUE AROM (degrees)  RUE AROM : WNL  LUE Strength  LUE Strength Comment: reports decreased strength in wrist with carrying objects d/t fracture in Nov 2020  RUE Strength  Gross RUE Strength: [facility]     Plan   Plan  Times per week: 3-5  Times per day: Daily  Current Treatment Recommendations: Self-Care / ADL, Safety Education & Training, Functional Mobility Training, Endurance Training    AM-PAC Score  AM-[facility] Inpatient Daily Activity Raw Score: 20 (02/16/21 1428)  AM-PAC Inpatient ADL T-Scale Score : 42.03 (02/16/21 1428)  ADL Inpatient CMS 0-100% Score: 38.32 (02/16/21 1428) ADL Inpatient CMS G-Code Modifier : CJ (02/16/21 7354)    Goals  Short term goals  Time Frame for Short term goals: prior to d/c  Short term goal 1: LB dressing with supervision  Short term goal 2: ADL tx with supervision  Short term goal 3: toileting with supervision  Short term goal 4: UB dressing Mod I  Short term goal 5: tolerate 5 min fxl standing task with supervision  Patient Goals   Patient goals : to be walking more steady     Therapy Time   Individual Concurrent Group Co-treatment   Time In 1320         Time Out 1410         Minutes 50         Timed Code Treatment Minutes: [facility], OTD, OTR/L

## 2021-02-16 NOTE — H&P
Update History & Physical    The patient's History and Physical of February 11, 2021 was reviewed with the patient and I examined the patient. There was no change. The surgical site was confirmed by the patient and me. Plan: The risks, benefits, expected outcome, and alternative to the recommended procedure have been discussed with the patient. Patient understands and wants to proceed with the procedure.      Electronically signed by Margie Mejia MD on 2/16/2021 at 7:15 AM

## 2021-02-16 NOTE — ANESTHESIA POSTPROCEDURE EVALUATION
Department of Anesthesiology  Postprocedure Note    Patient: Rossy Butterfield  MRN: [id number]  YOB: 1970  Date of evaluation: 2/16/2021  Time:  10:19 AM     Procedure Summary     Date: 02/16/21 Room / Location: [facility] [address] [place] [address]    Anesthesia Start: 7680 Anesthesia Stop: 3642    Procedure: RIGHT REMOVAL OF EXPLANT AND TOTAL KNEE REPLACEMENT (Right Knee) Diagnosis:       Prosthetic joint infection[facility]      (RIGHT TOTAL KNEE INFECTION)    Surgeons: Nova Martinez MD Responsible Provider: Elliot Miller MD    Anesthesia Type: general, regional ASA Status: 3          Anesthesia Type: general, regional    Cande Phase I: Cande Score: 8    Cande Phase II:      Last vitals: Reviewed and per EMR flowsheets.        Anesthesia Post Evaluation    Patient location during evaluation: bedside  Patient participation: complete - patient participated  Level of consciousness: awake  Pain score: 1  Airway patency: patent  Nausea & Vomiting: no nausea and no vomiting  Complications: no  Cardiovascular status: blood pressure returned to baseline  Respiratory status: acceptable  Hydration status: euvolemic

## 2021-02-16 NOTE — PROGRESS NOTES
Educated patient on purpose of 4 eyes skin assessment and asked patient if allowed to perform, patient verbalized understanding and agreed to assessment. 4 Eyes Skin Assessment     The patient is being assess for  Post-Op Surgical    I agree that 2 RN's have performed a thorough Head to Toe Skin Assessment on the patient. ALL assessment sites listed below have been assessed. Areas assessed by both nurses: Nida Jiménez and salazar  [x]   Head, Face, and Ears   [x]   Shoulders, Back, and Chest  [x]   Arms, Elbows, and Hands   [x]   Coccyx, Sacrum, and IschIum  [x]   Legs, Feet, and Heels        Does the Patient have Skin Breakdown?   Blanchable redness to coccyx         Andrei Prevention initiated:  NA   Wound Care Orders initiated:  NA      WOC nurse consulted for Pressure Injury (Stage 3,4, Unstageable, DTI, NWPT, and Complex wounds), New and Established Ostomies:  NA      Nurse 1 eSignature: Electronically signed by Lazarus Morgan RN on 2/16/21 at 2:57 PM EST    **SHARE this note so that the co-signing nurse is able to place an eSignature**    Nurse 2 eSignature: Electronically signed by Rahul White RN on 2/16/21 at 3:39 PM EST

## 2021-02-16 NOTE — CARE COORDINATION
[facility]    Referral received from CM to follow for home care services. I will follow for needs, and speak with patient to verify demos.         [facility] mobile: [phone]  [facility] office: [phone]

## 2021-02-16 NOTE — PROGRESS NOTES
Arrived in pre op for Right total knee; block performed by Dr. Yadira Alegria. Oxygen on at 3 liters. Medicated and comfortable for procedure. Wife at bedside. P-58, R-14, oxygen sat 98% at 3 liters.

## 2021-02-16 NOTE — PROGRESS NOTES
[facility] Orthopedic Surgery   Progress Note      S/P :  SUBJECTIVE  In bed. Alert and oriented. Pain is   described in right knee and with the intensity of moderate. Pain is described as aching. OBJECTIVE              Physical                      VITALS:  /78   Pulse 66   Temp 98.4 °F (36.9 °C) (Oral)   Resp 16   Ht 6' (1.829 m)   Wt 213 lb 13.5 oz (97 kg)   SpO2 99%   BMI 29.00 kg/m²                     MUSCULOSKELETAL:  right foot NVI. Wiggles toes to command. Pedal pulses are palpable. NEUROLOGIC:                                  Sensory:  Touch:  Right Lower Extremity:  normal                                                 Surgical wound appears clean and dry right knee with ACE and ice packs.      Data       CBC:   Lab Results   Component Value Date    WBC 4.9 02/09/2021    RBC 4.21 02/09/2021    RBC 4.94 01/07/2015    HGB 11.7 02/09/2021    HCT 36.4 02/09/2021    MCV 86.5 02/09/2021    MCH 27.7 02/09/2021    MCHC 32.0 02/09/2021    RDW 17.3 02/09/2021     02/09/2021    MPV 10.0 02/09/2021        WBC:    Lab Results   Component Value Date    WBC 4.9 02/09/2021        Hemoglobin/Hematocrit:    Lab Results   Component Value Date    HGB 11.7 02/09/2021    HCT 36.4 02/09/2021        PT/INR:    Lab Results   Component Value Date    PROTIME 13.4 02/09/2021    INR 1.15 02/09/2021              Current Inpatient Medications             Current Facility-Administered Medications: atorvastatin (LIPITOR) tablet 40 mg, 40 mg, Oral, Nightly  [START ON 2/17/2021] FLUoxetine (PROZAC) capsule 20 mg, 20 mg, Oral, Daily  traZODone (DESYREL) tablet 200 mg, 200 mg, Oral, Nightly  insulin glargine (LANTUS) injection vial 24 Units, 0.25 Units/kg, Subcutaneous, Nightly  insulin lispro (HUMALOG) injection vial 8 Units, 0.08 Units/kg, Subcutaneous, TID WC  insulin lispro (HUMALOG) injection vial 0-6 Units, 0-6 Units, Subcutaneous, TID  insulin lispro (HUMALOG) injection vial 0-3 Units, 0-3 Units, Subcutaneous, Nightly  glucose (GLUTOSE) 40 % oral gel 15 g, 15 g, Oral, PRN  dextrose 50 % IV solution, 12.5 g, Intravenous, PRN  glucagon (rDNA) injection 1 mg, 1 mg, Intramuscular, PRN  dextrose 5 % solution, 100 mL/hr, Intravenous, PRN  0.45 % sodium chloride infusion, , Intravenous, Continuous  sodium chloride flush 0.9 % injection 10 mL, 10 mL, Intravenous, 2 times per day  sodium chloride flush 0.9 % injection 10 mL, 10 mL, Intravenous, PRN  acetaminophen (TYLENOL) tablet 650 mg, 650 mg, Oral, Q6H  ceFAZolin (ANCEF) 2000 mg in dextrose 5 % 100 mL IVPB, 2,000 mg, Intravenous, Q8H  ondansetron (ZOFRAN-ODT) disintegrating tablet 4 mg, 4 mg, Oral, Q8H PRN **OR** ondansetron (ZOFRAN) injection 4 mg, 4 mg, Intravenous, Q6H PRN  sennosides-docusate sodium (SENOKOT-S) 8.6-50 MG tablet 1 tablet, 1 tablet, Oral, BID  magnesium hydroxide (MILK OF MAGNESIA) 400 MG/5ML suspension 30 mL, 30 mL, Oral, Daily PRN  aspirin EC tablet 81 mg, 81 mg, Oral, BID  HYDROmorphone (DILAUDID) injection 0.25 mg, 0.25 mg, Intravenous, Q3H PRN **OR** HYDROmorphone (DILAUDID) injection 0.5 mg, 0.5 mg, Intravenous, Q3H PRN  HYDROcodone-acetaminophen (NORCO) 5-325 MG per tablet 1 tablet, 1 tablet, Oral, Q4H PRN  HYDROcodone-acetaminophen (NORCO) 5-325 MG per tablet 2 tablet, 2 tablet, Oral, Q4H PRN    ASSESSMENT AND PLAN      Post right TKA revision  Hx right TKA infection  DVT prophylaxis ordered, ASA 81mg twice at day for 14 days for DVT prophylaxis  PT OT for ADL's and ambulation as tolerated  SS for DC planning, home with home care tomorrow  IV or PO pain med as ordered    Freda [facility]  2/16/2021  11:57 AM

## 2021-02-16 NOTE — ANESTHESIA PROCEDURE NOTES
Peripheral Block    Patient location during procedure: pre-op  Start time: 2/16/2021 7:02 AM  End time: 2/16/2021 7:02 AM  Staffing  Performed: anesthesiologist   Anesthesiologist: Ishan Davis MD  Preanesthetic Checklist  Completed: patient identified, IV checked, site marked, risks and benefits discussed, surgical consent, monitors and equipment checked, pre-op evaluation, timeout performed, anesthesia consent given, oxygen available and patient being monitored  Peripheral Block  Patient position: supine  Prep: ChloraPrep  Patient monitoring: continuous pulse ox and IV access  Block type: iPacks  Laterality: right  Injection technique: single-shot  Guidance: ultrasound guided  Provider prep: mask  Assessment  Injection assessment: negative aspiration for heme, no paresthesia on injection and local visualized surrounding nerve on ultrasound  Paresthesia pain: none  Slow fractionated injection: yes  Hemodynamics: stable  Additional Notes  Midazolam 2 mg administered for placement of 20 ml of 0.2% Ropivacaine anterior to right popliteal artery in the knee capsule under dynamic ultrasound guidance with fractionated injection. No complications. No blood loss.    Medications Administered  Ropivacaine (NAROPIN) injection 0.2%, 20 mL  Reason for block: post-op pain management and at surgeon's request

## 2021-02-17 VITALS
HEART RATE: 60 BPM | TEMPERATURE: 98.4 F | OXYGEN SATURATION: 96 % | RESPIRATION RATE: 16 BRPM | BODY MASS INDEX: 29.35 KG/M2 | HEIGHT: 72 IN | SYSTOLIC BLOOD PRESSURE: 127 MMHG | DIASTOLIC BLOOD PRESSURE: 72 MMHG | WEIGHT: 216.71 LBS

## 2021-02-17 LAB
ANION GAP SERPL CALCULATED.3IONS-SCNC: 7 MMOL/L (ref 3–16)
BUN BLDV-MCNC: 6 MG/DL (ref 7–20)
CALCIUM SERPL-MCNC: 8.8 MG/DL (ref 8.3–10.6)
CHLORIDE BLD-SCNC: 102 MMOL/L (ref 99–110)
CO2: 26 MMOL/L (ref 21–32)
CREAT SERPL-MCNC: 1 MG/DL (ref 0.9–1.3)
GFR AFRICAN AMERICAN: >60
GFR NON-AFRICAN AMERICAN: >60
GLUCOSE BLD-MCNC: 108 MG/DL (ref 70–99)
GLUCOSE BLD-MCNC: 137 MG/DL (ref 70–99)
HCT VFR BLD CALC: 28.1 % (ref 40.5–52.5)
HEMOGLOBIN: 8.9 G/DL (ref 13.5–17.5)
MCH RBC QN AUTO: 27.5 PG (ref 26–34)
MCHC RBC AUTO-ENTMCNC: 31.8 G/DL (ref 31–36)
MCV RBC AUTO: 86.5 FL (ref 80–100)
PDW BLD-RTO: 16.9 % (ref 12.4–15.4)
PERFORMED ON: ABNORMAL
PLATELET # BLD: 211 K/UL (ref 135–450)
PMV BLD AUTO: 9.3 FL (ref 5–10.5)
POTASSIUM SERPL-SCNC: 3.9 MMOL/L (ref 3.5–5.1)
RBC # BLD: 3.25 M/UL (ref 4.2–5.9)
SODIUM BLD-SCNC: 135 MMOL/L (ref 136–145)
WBC # BLD: 12 K/UL (ref 4–11)

## 2021-02-17 PROCEDURE — 6370000000 HC RX 637 (ALT 250 FOR IP): Performed by: INTERNAL MEDICINE

## 2021-02-17 PROCEDURE — 97535 SELF CARE MNGMENT TRAINING: CPT

## 2021-02-17 PROCEDURE — 6360000002 HC RX W HCPCS: Performed by: ORTHOPAEDIC SURGERY

## 2021-02-17 PROCEDURE — 97530 THERAPEUTIC ACTIVITIES: CPT

## 2021-02-17 PROCEDURE — 2580000003 HC RX 258: Performed by: ORTHOPAEDIC SURGERY

## 2021-02-17 PROCEDURE — 80048 BASIC METABOLIC PNL TOTAL CA: CPT

## 2021-02-17 PROCEDURE — 6370000000 HC RX 637 (ALT 250 FOR IP): Performed by: ORTHOPAEDIC SURGERY

## 2021-02-17 PROCEDURE — 6360000002 HC RX W HCPCS: Performed by: NURSE PRACTITIONER

## 2021-02-17 PROCEDURE — 96366 THER/PROPH/DIAG IV INF ADDON: CPT

## 2021-02-17 PROCEDURE — G0378 HOSPITAL OBSERVATION PER HR: HCPCS

## 2021-02-17 PROCEDURE — 97110 THERAPEUTIC EXERCISES: CPT

## 2021-02-17 PROCEDURE — 96376 TX/PRO/DX INJ SAME DRUG ADON: CPT

## 2021-02-17 PROCEDURE — 6370000000 HC RX 637 (ALT 250 FOR IP): Performed by: NURSE PRACTITIONER

## 2021-02-17 PROCEDURE — 94761 N-INVAS EAR/PLS OXIMETRY MLT: CPT

## 2021-02-17 PROCEDURE — 97116 GAIT TRAINING THERAPY: CPT

## 2021-02-17 PROCEDURE — 36415 COLL VENOUS BLD VENIPUNCTURE: CPT

## 2021-02-17 PROCEDURE — 85027 COMPLETE CBC AUTOMATED: CPT

## 2021-02-17 RX ORDER — ZOLPIDEM TARTRATE 10 MG/1
10 TABLET ORAL NIGHTLY PRN
COMMUNITY
Start: 2021-02-08 | End: 2021-03-19 | Stop reason: SDUPTHER

## 2021-02-17 RX ORDER — ONDANSETRON 4 MG/1
4 TABLET, ORALLY DISINTEGRATING ORAL EVERY 8 HOURS PRN
COMMUNITY
Start: 2021-01-31 | End: 2021-06-22 | Stop reason: ALTCHOICE

## 2021-02-17 RX ORDER — KETOROLAC TROMETHAMINE 15 MG/ML
15 INJECTION, SOLUTION INTRAMUSCULAR; INTRAVENOUS ONCE
Status: COMPLETED | OUTPATIENT
Start: 2021-02-17 | End: 2021-02-17

## 2021-02-17 RX ORDER — DOXYCYCLINE HYCLATE 100 MG/1
100 CAPSULE ORAL 2 TIMES DAILY
Qty: 60 CAPSULE | Refills: 1 | Status: SHIPPED | OUTPATIENT
Start: 2021-02-17 | End: 2021-03-15 | Stop reason: SDUPTHER

## 2021-02-17 RX ADMIN — HYDROMORPHONE HYDROCHLORIDE 0.5 MG: 1 INJECTION, SOLUTION INTRAMUSCULAR; INTRAVENOUS; SUBCUTANEOUS at 04:26

## 2021-02-17 RX ADMIN — HYDROCODONE BITARTRATE AND ACETAMINOPHEN 2 TABLET: 5; 325 TABLET ORAL at 00:24

## 2021-02-17 RX ADMIN — ASPIRIN 81 MG: 81 TABLET, FILM COATED ORAL at 08:21

## 2021-02-17 RX ADMIN — ACETAMINOPHEN 650 MG: 325 TABLET ORAL at 00:09

## 2021-02-17 RX ADMIN — FLUOXETINE 20 MG: 20 CAPSULE ORAL at 08:21

## 2021-02-17 RX ADMIN — HYDROMORPHONE HYDROCHLORIDE 0.5 MG: 1 INJECTION, SOLUTION INTRAMUSCULAR; INTRAVENOUS; SUBCUTANEOUS at 01:25

## 2021-02-17 RX ADMIN — KETOROLAC TROMETHAMINE 15 MG: 15 INJECTION, SOLUTION INTRAMUSCULAR; INTRAVENOUS at 10:12

## 2021-02-17 RX ADMIN — DOXYCYCLINE HYCLATE 100 MG: 100 TABLET, COATED ORAL at 08:21

## 2021-02-17 RX ADMIN — DOCUSATE SODIUM 50 MG AND SENNOSIDES 8.6 MG 1 TABLET: 8.6; 5 TABLET, FILM COATED ORAL at 08:21

## 2021-02-17 RX ADMIN — SODIUM CHLORIDE, PRESERVATIVE FREE 10 ML: 5 INJECTION INTRAVENOUS at 08:23

## 2021-02-17 RX ADMIN — HYDROMORPHONE HYDROCHLORIDE 0.5 MG: 1 INJECTION, SOLUTION INTRAMUSCULAR; INTRAVENOUS; SUBCUTANEOUS at 08:21

## 2021-02-17 ASSESSMENT — PAIN DESCRIPTION - LOCATION
LOCATION: KNEE

## 2021-02-17 ASSESSMENT — PAIN DESCRIPTION - FREQUENCY
FREQUENCY: CONTINUOUS

## 2021-02-17 ASSESSMENT — PAIN DESCRIPTION - ORIENTATION
ORIENTATION: RIGHT
ORIENTATION: RIGHT

## 2021-02-17 ASSESSMENT — PAIN DESCRIPTION - PAIN TYPE
TYPE: ACUTE PAIN

## 2021-02-17 ASSESSMENT — PAIN DESCRIPTION - ONSET
ONSET: ON-GOING

## 2021-02-17 ASSESSMENT — PAIN DESCRIPTION - DESCRIPTORS
DESCRIPTORS: SHARP
DESCRIPTORS: ACHING;CONSTANT

## 2021-02-17 ASSESSMENT — PAIN SCALES - GENERAL
PAINLEVEL_OUTOF10: 2
PAINLEVEL_OUTOF10: 5
PAINLEVEL_OUTOF10: 7
PAINLEVEL_OUTOF10: 8
PAINLEVEL_OUTOF10: 3
PAINLEVEL_OUTOF10: 4
PAINLEVEL_OUTOF10: 9
PAINLEVEL_OUTOF10: 10

## 2021-02-17 NOTE — PLAN OF CARE
Problem: Sensory:  Goal: General experience of comfort will improve  Description: General experience of comfort will improve  2/17/2021 1024 by Donte Andersen RN  Outcome: Ongoing  2/16/2021 2305 by Mario Schaeffer RN  Outcome: Ongoing

## 2021-02-17 NOTE — PROGRESS NOTES
Physical Therapy    Facility/Department: [address] ORTHOPEDICS  Daily Treatment / Discharge      NAME: Milla Zarco  : 1970  MRN: [id number]    Date of Service: 2021    Discharge Recommendations:  S Level 1, 24 hour supervision or assist   Milla Zarco scored a 24/24 on the AM-PAC short mobility form. Current research shows that an AM-PAC score of 18 or greater is typically associated with a discharge to the patient's home setting. Based on the patient's AM-PAC score and their current functional mobility deficits, it is recommended that the patient have 2-3 sessions per week of Physical Therapy at d/c to increase the patient's independence. At this time, this patient demonstrates the endurance and safety to discharge home with home therapy services and a follow up treatment frequency of 2-3x/wk. Please see assessment section for further patient specific details. PT Equipment Recommendations  Equipment Needed: No    Assessment   Body structures, Functions, Activity limitations: Decreased functional mobility ; Decreased safe awareness;Decreased ROM; Increased pain  Assessment: Prior to R TKR revision 2021 via Dr. Jordan Otero, patient reportedly lived in home with wife, and was independent in all functional mobility, using cane as needed for gait. Status 2021: Transfers supervision, RW amb 79' SBA-supervision, with Good tolerance but Fair+ quality (cues consistently for controlled melina vs fast). Patient familiar with general recovery expectations from multiple prior knee procedures. Patient appears ok for DC to home with family support and home PT level 1. He reports having RW at home.   Treatment Diagnosis: Impaired functional mobility  Prognosis: Good  History: as noted  PT Education: Goals;PT Role;Plan of Care;General Safety;Transfer Training;Gait Training Patient Education: Importance of positoining knee in max degree of extension and flexion. Importance of controlled and quality mobility. REQUIRES PT FOLLOW UP: No(transition to next level of care)  Activity Tolerance  Activity Tolerance: Patient Tolerated treatment well       Patient Diagnosis(es): The primary encounter diagnosis was History of total knee arthroplasty, right. A diagnosis of Prosthetic joint infection, initial encounter [facility]) was also pertinent to this visit. has a past medical history of Alcoholism ([address] Allergic migraine with status migrainosus, Allergic rhinitis, seasonal, Anemia, Arthritis, Vaughn's esophagus, Benign intracranial hypertension, Cancer ([address] Carpal tunnel syndrome, Chronic pain, Depression, Depression, GERD (gastroesophageal reflux disease), Headache(784.0), History of blood transfusion, History of tobacco use, Migraine, Morbid obesity ([address] Movement disorder, Onychomycosis, Sleep apnea, obstructive, Unspecified cerebral artery occlusion with cerebral infarction, Wears dentures, and Wears glasses. has a past surgical history that includes Gastric bypass surgery (Jan 2009); Splenectomy; LASIK (2005); knee surgery (July 2012); Carpal tunnel release; laparotomy; Knee arthroscopy (Right, 7/11/2013); Knee arthroscopy (Right, 7/11/12); Total knee arthroplasty (10/15/13); Endoscopy, colon, diagnostic; Dilatation, esophagus; eye surgery; joint replacement; Abdominal exploration surgery (1/11/16); other surgical history (Left, 03/08/2016); hernia repair (3-); Upper gastrointestinal endoscopy (6-7-2016); other surgical history (2016); Upper gastrointestinal endoscopy (04/02/2018); Kidney removal (Left, 08/01/2018); Abdomen surgery; Abdomen surgery (4-7-2016); Revision total knee arthroplasty (Right, 12/17/2019); Revision total knee arthroplasty (Right, 1/22/2020); knee surgery (Right, 2/18/2020); and Total knee arthroplasty (Right, 8/12/2020).     Restrictions Restrictions/Precautions  Restrictions/Precautions: Fall Risk, Weight Bearing  Lower Extremity Weight Bearing Restrictions  Right Lower Extremity Weight Bearing: Weight Bearing As Tolerated        Subjective  General  Chart Reviewed: Yes  Additional Pertinent Hx: 54 yo male  with history of R Total Knee infection, to hospital 2- for Revision R TKR via Dr. Lb Mondragon. WBAT. PMHx as noted including prior knee replacement, poly exchange, wound vac. Response To Previous Treatment: Patient with no complaints from previous session. Family / Caregiver Present: No  Referring Practitioner: Lb Mondragon MD  Subjective  Subjective: Patient in BS chair, agreeable to therapy. Rates R knee pain 6/10 following ambulation and stairs. States feeling comfortable going home; very familiar with recovery process. Pain Screening  Patient Currently in Pain: Yes    Orientation  Orientation  Overall Orientation Status: Within Normal Limits     Social/Functional History  Social/Functional History  Lives With: Spouse, Son, Daughter  Type of Home: House  Home Layout: One level, Able to Live on Main level with bedroom/bathroom  Home Access: Stairs to enter with rails, Stairs to enter without rails  Entrance Stairs - Number of Steps: 3 in front no rails.  5 in back bilateral rails  Bathroom Shower/Tub: Walk-in shower  Bathroom Toilet: Standard  Bathroom Equipment: Shower chair, Hand-held shower  Bathroom Accessibility: Walker accessible  Home Equipment: Rolling walker, Cane  ADL Assistance: Independent  Homemaking Assistance: Independent  Ambulation Assistance: Independent(cane)  Leisure & Hobbies: plays piano  Additional Comments: Fall in parking lot getting into car with R knee buckling Nov 2020 resulting in L wrist fracture- current limitation is lifting with LUE     Cognition   Cognition  Safety Judgement: Decreased awareness of need for safety    Objective  Transfers Sit to Stand: Stand by assistance;Supervision(good technique)  Stand to sit: Stand by assistance;Supervision(good technique)  Ambulation  Ambulation?: Yes  Ambulation 1  Surface: level tile  Device: Rolling Walker  Assistance: Supervision;Stand by assistance  Quality of Gait: Cues to keep melina controlled vs fast. Needs cues for this consistently throughout gait. Step through pattern. Distance: 5' x 2, 70'  Stairs/Curb  Stairs?: Yes  Stairs  # Steps : 4  Stairs Height: 6\"  Rails: Bilateral  Assistance: Supervision  Comment: correct sequencing, without cues. Balance  Posture: Good  Sitting - Static: Good  Sitting - Dynamic: Good  Standing - Static: Good  Standing - Dynamic: Good(at RW)  Exercises  Quad Sets: x 5 B  Gluteal Sets: x 5 B  Knee Active Range of Motion: 15-75 AAROM  Ankle Pumps: x 10. Cues for full DF  Comments: AROM-AAROM knee flexion in sitting, with foot on glide, x 3. Written TKR HEP (including positioning instructions to maximize knee flexion and extension) provided to patient 2-. Thoroughly reviewed 2-. Patient verbalizes understanding. Plan   Plan  Times per week: 1-4 visits as needed  Current Treatment Recommendations: Functional Mobility Training, Transfer Training, Gait Training, Stair training, Home Exercise Program, Safety Education & Training, Patient/Caregiver Education & Training  Safety Devices  Type of devices: Bed alarm in place, Call light within reach, Left in bed, Nurse notified(Ice packs filled and placed to knee. Ortho RN Informed.)    AM-PAC Score  AM-PAC Inpatient Mobility Raw Score : 24 (02/17/21 0946)  AM-PAC Inpatient T-Scale Score : 61.14 (02/17/21 0946)  Mobility Inpatient CMS 0-100% Score: 0 (02/17/21 0946)  Mobility Inpatient CMS G-Code Modifier : 509 [address] (02/17/21 0442)     Goals  Short term goals  Time Frame for Short term goals: By acute DC.  2-[de-identified] all goals met.   Short term goal 1: Transfers SBA  Short term goal 2: Gait wh walker 50' SBA

## 2021-02-17 NOTE — PROGRESS NOTES
Prn ambien given as ordered. Distilled water on it's way from environmental services. Fall risk assessment completed. Fall precautions in place. Call light within reach. Pt educated on calling for assistance before getting up. Walkway free of clutter. Will continue to monitor. Ace bandage in place. Scd's in place.      Electronically signed by Herberth Aguilar RN on 2/16/2021 at 9:44 PM

## 2021-02-17 NOTE — PLAN OF CARE
Problem: Sensory:  Goal: General experience of comfort will improve  Description: General experience of comfort will improve  Outcome: Ongoing     Problem: Mobility - Impaired:  Goal: Mobility will improve  Description: Mobility will improve  Outcome: Ongoing     Problem: Infection - Surgical Site:  Goal: Will show no infection signs and symptoms  Description: Will show no infection signs and symptoms  Outcome: Ongoing     Problem: Pain - Acute:  Goal: Pain level will decrease  Description: Pain level will decrease  Outcome: Ongoing     Problem: Falls - Risk of:  Goal: Will remain free from falls  Description: Will remain free from falls  Outcome: Ongoing  Goal: Absence of physical injury  Description: Absence of physical injury  Outcome: Ongoing     Problem: Pain:  Goal: Pain level will decrease  Description: Pain level will decrease  Outcome: Ongoing  Goal: Control of acute pain  Description: Control of acute pain  Outcome: Ongoing  Goal: Control of chronic pain  Description: Control of chronic pain  Outcome: Ongoing

## 2021-02-17 NOTE — PROGRESS NOTES
[facility] Orthopedic Surgery   Progress Note      S/P :  SUBJECTIVE  Up on couch with OT. Alert and oriented. Pain is   described in right knee and with the intensity of moderate. Pain is described as aching. OBJECTIVE              Physical                      VITALS:  /72   Pulse 60   Temp 98.4 °F (36.9 °C) (Oral)   Resp 16   Ht 6' (1.829 m)   Wt 216 lb 11.4 oz (98.3 kg)   SpO2 96%   BMI 29.39 kg/m²                     MUSCULOSKELETAL:  right foot NVI. Wiggles toes to command. Pedal pulses are palpable. Right lateral knee with swelling and bruising, may be hematoma as is firm. NEUROLOGIC:                                  Sensory:  Touch:  Right Lower Extremity:  normal                                                 Surgical wound appears clean and dry lateral/anterior left knee wound with Prineo dressing and medial wound with serosang drainage covered by gauze and tape dressing., MIAH hose on.      Data       CBC:   Lab Results   Component Value Date    WBC 12.0 02/17/2021    RBC 3.25 02/17/2021    RBC 4.94 01/07/2015    HGB 8.9 02/17/2021    HCT 28.1 02/17/2021    MCV 86.5 02/17/2021    MCH 27.5 02/17/2021    MCHC 31.8 02/17/2021    RDW 16.9 02/17/2021     02/17/2021    MPV 9.3 02/17/2021        WBC:    Lab Results   Component Value Date    WBC 12.0 02/17/2021        Hemoglobin/Hematocrit:    Lab Results   Component Value Date    HGB 8.9 02/17/2021    HCT 28.1 02/17/2021        PT/INR:    Lab Results   Component Value Date    PROTIME 13.4 02/09/2021    INR 1.15 02/09/2021              Current Inpatient Medications             Current Facility-Administered Medications: atorvastatin (LIPITOR) tablet 40 mg, 40 mg, Oral, Nightly  FLUoxetine (PROZAC) capsule 20 mg, 20 mg, Oral, Daily  traZODone (DESYREL) tablet 200 mg, 200 mg, Oral, Nightly  0.45 % sodium chloride infusion, , Intravenous, Continuous sodium chloride flush 0.9 % injection 10 mL, 10 mL, Intravenous, 2 times per day  sodium chloride flush 0.9 % injection 10 mL, 10 mL, Intravenous, PRN  ondansetron (ZOFRAN-ODT) disintegrating tablet 4 mg, 4 mg, Oral, Q8H PRN **OR** ondansetron (ZOFRAN) injection 4 mg, 4 mg, Intravenous, Q6H PRN  sennosides-docusate sodium (SENOKOT-S) 8.6-50 MG tablet 1 tablet, 1 tablet, Oral, BID  magnesium hydroxide (MILK OF MAGNESIA) 400 MG/5ML suspension 30 mL, 30 mL, Oral, Daily PRN  aspirin EC tablet 81 mg, 81 mg, Oral, BID  HYDROmorphone (DILAUDID) injection 0.25 mg, 0.25 mg, Intravenous, Q3H PRN **OR** HYDROmorphone (DILAUDID) injection 0.5 mg, 0.5 mg, Intravenous, Q3H PRN  HYDROcodone-acetaminophen (NORCO) 5-325 MG per tablet 1 tablet, 1 tablet, Oral, Q4H PRN  HYDROcodone-acetaminophen (NORCO) 5-325 MG per tablet 2 tablet, 2 tablet, Oral, Q4H PRN  doxycycline hyclate (VIBRA-TABS) tablet 100 mg, 100 mg, Oral, 2 times per day  zolpidem (AMBIEN) tablet 5 mg, 5 mg, Oral, Nightly PRN    ASSESSMENT AND PLAN      Advised ice right knee for swelling and pain and bruising  Post right TKA revision with hx infection  ID recommended cont antibiotics po at home and are ordered  DVT prophylaxis ordered, ASA 81mg twice at day for 14 days for DVT prophylaxis  PT OT for ADL's and ambulation as tolerated  SS for DC planning, home with home care today  IV or PO pain med as ordered    Iliana Ramirez  2/17/2021  9:30 AM

## 2021-02-17 NOTE — PROGRESS NOTES
Data- discharge order received, patient verbalized agreement to discharge, disposition to previous residence, needs noted for Jessica Alford and informed Lyndsey De Anda NP. Action- discharge instructions prepared and given to patient, patient verbalized understanding. Medication information packet given r/t NEW and/or CHANGED prescriptions emphasizing name/purpose/side effects, pt verbalized understanding. Discharge instruction summary: Diet- general, Activity- wbat, Primary Care Physician as follows: Annette James -[phone]. f/u appointment with orthopedic office noted below, immunizations reviewed and discussed with patient, prescription medications to be filled by retail pharmacy and then delivered. Inpatient surgical procedure precautions reviewed: . Neurovascular check performed and patient is WNLs, denies numbness/tingling in extremties. Incision site  prineo glue dressing assessed and is  clean,dry, and intact, no signs of redness, drainage, or odor noted. Medial incision site covered with gauze and tape and is clean, dry, intact. patient's bedside RN liberty notified of patient completing discharge instructions and iv removal.    Response- IV removed. Medications to be delivered to patient via meds to bed program. Disposition is home with Jessica Alford, to be transported with family.     Future Appointments   Date Time Provider Timothy Beal   3/1/2021 10:00 AM Kristine Barajas MD [facility]   3/19/2021 12:20 PM LILLIE Hernandez - CNP [facility]       Electronically signed by Colleen Castillo RN on 2/17/2021 at 10:21 AM

## 2021-02-17 NOTE — PROGRESS NOTES
has a past surgical history that includes Gastric bypass surgery (Jan 2009); Splenectomy; LASIK (2005); knee surgery (July 2012); Carpal tunnel release; laparotomy; Knee arthroscopy (Right, 7/11/2013); Knee arthroscopy (Right, 7/11/12); Total knee arthroplasty (10/15/13); Endoscopy, colon, diagnostic; Dilatation, esophagus; eye surgery; joint replacement; Abdominal exploration surgery (1/11/16); other surgical history (Left, 03/08/2016); hernia repair (3-); Upper gastrointestinal endoscopy (6-7-2016); other surgical history (2016); Upper gastrointestinal endoscopy (04/02/2018); Kidney removal (Left, 08/01/2018); Abdomen surgery; Abdomen surgery (4-7-2016); Revision total knee arthroplasty (Right, 12/17/2019); Revision total knee arthroplasty (Right, 1/22/2020); knee surgery (Right, 2/18/2020); and Total knee arthroplasty (Right, 8/12/2020). Restrictions  Restrictions/Precautions  Restrictions/Precautions: Fall Risk, Weight Bearing  Lower Extremity Weight Bearing Restrictions  Right Lower Extremity Weight Bearing: Weight Bearing As Tolerated  Subjective   General  Chart Reviewed: Yes  Patient assessed for rehabilitation services?: Yes  Additional Pertinent Hx: Pt admitted 2/16 for elective Revision R TKA revision- with R arthroplasty resection with insertion of spacer August 2020. Pt with original R TKA in 2013, Revision in 12/2019, I&D and polyexchange in Jan 2020, and in Feb had I& D and wound vac placement. Response to previous treatment: Patient with no complaints from previous session  Family / Caregiver Present: No  Referring Practitioner: Liban Harding MD  Diagnosis: Revision R TKA  Subjective  Subjective: Patient supine in bed upon arrival to room. Patient agreeable to OT.  Reports pain as \"tolerable\"  General Comment  Comments: RN ok for therapy      Orientation  Orientation  Overall Orientation Status: Within Functional Limits  Objective    ADL Grooming: Supervision(standing in front of sink to wash hands and face)  UE Dressing: Setup  LE Dressing: Stand by assistance(SBA to thread clothing over feet and hips, assist for dimas stockings)  Toileting: Stand by assistance(standing in front of commode to urinate)        Balance  Sitting Balance: Modified independent   Standing Balance: Stand by assistance  Standing Balance  Activity: Stood for ADL tasks  Functional Mobility  Functional - Mobility Device: Rolling Walker  Activity: To/From therapy gym; Other  Assist Level: Stand by assistance  Functional Mobility Comments: Functional mobility with RW with SBA, impulsive with mobility, moderate cues for walker safety  Toilet Transfers  Toilet Transfers Comments: stood to toileting tasks  Bed mobility  Supine to Sit: Stand by assistance(HOB elevated and side rail)  Sit to Supine: (up in chair at end of session)  Transfers  Sit to stand: Stand by assistance  Stand to sit: Stand by assistance  Transfer Comments: SBA for sit<>stand from EOB to RW to couch to recliner chair with moderate cues for hand placement and safety     Cognition  Arousal/Alertness: Appropriate responses to stimuli  Following Commands: Follows multistep commands with increased time  Attention Span: Attends with cues to redirect  Memory: Decreased recall of precautions  Safety Judgement: Decreased awareness of need for assistance  Problem Solving: Assistance required to generate solutions  Insights: Decreased awareness of deficits  Cognition Comment: moderately impulsive    Assessment   Performance deficits / Impairments: Decreased functional mobility ; Decreased safe awareness;Decreased balance;Decreased high-level IADLs

## 2021-02-17 NOTE — PROGRESS NOTES
Patient alert and oriented times four. Patient denies any n/v at this time. Fall risk assessment completed. Fall precautions in place. Call light within reach. Pt educated on calling for assistance before getting up. Walkway free of clutter. Patient educated on how it is recommended to ambulate four times or more today. Patient verbalizes understanding . Ace bandage taken off, patient tolerated well.  Prn pain medication given as ordered Electronically signed by Dea Luna RN on 2/17/2021 at 4:42 AM

## 2021-02-17 NOTE — PROGRESS NOTES
Clinical Pharmacy Note  Medication Counseling    Reviewed new medications started during hospital admission: hydrocodone/acetaminophen, , Senokot S, doxycycline, aspirin. Also reviewed discharge prescriptions, including aspirin 81 mg twice daily for 14 days, doxycycline 100 mg twice daily, and Norco 5/325mg 1-2 tablets every 4 hours prn pain. Patient is aware that he should not take more than 3-4 grams of acetaminophen in any 24 hour period, and that the Simmie Fried does contain acetaminophen. Indications and side effects were emphasized during counseling. All medication-related questions addressed. Patient verbalized understanding of education. Should the patient express any additional questions or concerns regarding their medications, please do not hesitate to contact the pharmacy department. Patient/caregiver aware they may refuse medications during hospital stay. 10 minutes spent educating patient regarding medications.     Marcia Chavez, PharmD, BCPS  2/17/2021  11:54 AM

## 2021-02-17 NOTE — PROGRESS NOTES
Bleeding noted to medial incision site, cleaned and covered with gauze and tape. Provided supplies for patient to take home and educated on changing bandage and covering if draining. Patient verbalized understanding. Will continue to monitor and reassess.   Electronically signed by Andrea Macdonald RN on 2/17/2021 at 9:04 AM

## 2021-02-17 NOTE — PROGRESS NOTES
Patient a/o times four. Room air. No tele. V/s stable. Blood sugar 164. Post op today with MD Jacquie Cardenas for a right total knee. Patient has scheduled trazodone however is requesting ambien to help aid in sleep as well. MD Notified. Prn pain medication is Norco 5 mg q 4 and dilaudid  0.5 mg q 3. Dressing in place. SCDS in place. I/S Encouraged. Bed side urinal in place. Bed alarm on. Iv fluids infusing as ordered. Electronically signed by Mackenzie Torre RN on 2/16/2021 at 9:12 PM

## 2021-02-17 NOTE — CARE COORDINATION
[facility]  Received referral from case management   Spoke with patent regarding [facility] services, Faxed orders to [address] care to see patient by   2/19/2021  Mary Duarte LPN    [address] Cell [phone], Fax [phone]

## 2021-02-17 NOTE — PROGRESS NOTES
Encouraged coughing and deep breathing every two hours while awake. Patient verbalizes understanding. Patient currently with home cpap machine in place.  Electronically signed by William Boone RN on 2/17/2021 at 12:56 AM

## 2021-02-18 ENCOUNTER — CARE COORDINATION (OUTPATIENT)
Dept: CASE MANAGEMENT | Age: 51
End: 2021-02-18

## 2021-02-18 ENCOUNTER — CARE COORDINATION (OUTPATIENT)
Dept: CARE COORDINATION | Age: 51
End: 2021-02-18

## 2021-02-18 NOTE — CARE COORDINATION
Janay 45 Transitions Initial Follow Up Call    Call within 2 business days of discharge: Yes    Patient: Vipul Vargas Patient : 1970   MRN: [id number]  Reason for Admission: Right total knee arthoplasty prosthesis infection  Discharge Date: 21 RARS: Readmission Risk Score: 23      Last Discharge [facility]       Complaint Diagnosis Description Type Department Provider    21  History of total knee arthroplasty, right . .. Admission (Discharged) Gisella Escobar MD          Non-face-to-face services provided:  Obtained and reviewed discharge summary and/or continuity of care documents  Communication with home health agencies or other community services the patient is currently [facility]    Care Transitions 24 Hour Call    Do you have all of your prescriptions and are they filled?: Yes  Care Transitions Interventions     Attempted to reach patient via phone for initial post hospital transition call. VM left with my contact information requesting a return call. Writer spoke with [facility] from [facility], she stated would have someone call back to confirm referral.    Jeffrey Carrizales from [facility] called back and confirmed they had a referral for patient.         Follow Up  Future Appointments   Date Time Provider Timothy Beal   3/1/2021 10:00 AM Yohana Stinson MD [facility]   3/19/2021 12:20 PM LILLIE Diaz - CNP  SLEEP MED [facility]       Megan Vásquez RN

## 2021-02-18 NOTE — CARE COORDINATION
Patient was contacted today by MICHAEL Munoz, Care Transitions nurse from [facility].     This ACM contacted patient on 10/12/2020 and patient denied needs from [address] [place] [address] at that time. ACM will remove name from care team at this time and will follow up with patient in two weeks to access for Ambulatory Care Coordination needs at that time.

## 2021-02-19 ENCOUNTER — TELEPHONE (OUTPATIENT)
Dept: ORTHOPEDIC SURGERY | Age: 51
End: 2021-02-19

## 2021-02-19 ENCOUNTER — CARE COORDINATION (OUTPATIENT)
Dept: CASE MANAGEMENT | Age: 51
End: 2021-02-19

## 2021-02-19 DIAGNOSIS — G47.33 OBSTRUCTIVE SLEEP APNEA: Primary | ICD-10-CM

## 2021-02-19 DIAGNOSIS — Z96.651 HISTORY OF TOTAL KNEE ARTHROPLASTY, RIGHT: ICD-10-CM

## 2021-02-19 RX ORDER — HYDROCODONE BITARTRATE AND ACETAMINOPHEN 5; 325 MG/1; MG/1
1-2 TABLET ORAL EVERY 4 HOURS PRN
Qty: 60 TABLET | Refills: 0 | Status: SHIPPED | OUTPATIENT
Start: 2021-02-19 | End: 2021-02-24 | Stop reason: SDUPTHER

## 2021-02-19 NOTE — CARE COORDINATION
Janay 45 Transitions Initial Follow Up Call    Call within 2 business days of discharge: Yes    Patient: Charlene Bonilla Patient : 1970   MRN: [id number]  Reason for Admission: right total knee arthroplasty prosthesis infection  Discharge Date: 21 RARS: Readmission Risk Score: 23      Last Discharge [facility]       Complaint Diagnosis Description Type Department Provider    21  History of total knee arthroplasty, right . .. Admission (Discharged) Lynette Urena MD        Challenges to be reviewed by the provider   Additional needs identified to be addressed with provider No  none    Discussed COVID-19 related testing which was available at this time. Test results were negative. Patient informed of results, if available? Yes         Method of communication with provider : none    Was this a readmission? No    Care Transition Nurse (CTN) contacted the patient by telephone to perform post hospital discharge assessment. Verified name and  with patient as identifiers. Provided introduction to self, and explanation of the CTN role. CTN reviewed discharge instructions, medical action plan and red flags with patient who verbalized understanding. Patient given an opportunity to ask questions and does not have any further questions or concerns at this time. Were discharge instructions available to patient? Yes. Reviewed appropriate site of care based on symptoms and resources available to patient including: PCP and Specialist. The patient agrees to contact the PCP office for questions related to their healthcare. Medication reconciliation was performed with patient, who verbalizes understanding of administration of home medications. Advised obtaining a 90-day supply of all daily and as-needed medications. Discussed follow-up appointments. If no appointment was previously scheduled, appointment scheduling offered: has appt with surgeon.  Is follow up appointment scheduled within 7 days of discharge? No  Non-[facility] follow up appointment(s):     Plan for follow-up call in 5-7 days based on severity of symptoms and risk factors. Plan for next call: symptom management-pain  CTN provided contact information for future needs. Non-face-to-face services provided:  Obtained and reviewed discharge summary and/or continuity of care documents  Communication with home health agencies or other community services the patient is currently using-[facility]  Assessment and support for treatment adherence and medication management-1111f completed    Care Transitions 24 Hour Call    Do you have any ongoing symptoms?: Yes  Patient-reported symptoms: Pain  Do you have a copy of your discharge instructions?: Yes  Do you have all of your prescriptions and are they filled?: Yes  Have you scheduled your follow up appointment?: Yes  Were you discharged with any Home Care or Post Acute Services: Yes  Post Acute Services: [facility] ([facility] [address] [place] [address])  Care Transitions Interventions     Writer spoke with patient, [facility]. He stated his pain is a 7 on 0-10 scale and took a pain pill about an hour ago. He stated his knee is bruised but looks OK. He stated wearing MIAH hose and applying ice as prescribed. He stated has been doing his exercises and that PT was there yesterday and OT coming today. He asked if a nurse would be coming, he stated did not have any immediate needs just wanted to know if one was ordered. Writer stated would check on this and call patient back. Writer called [facility] and spoke with Guillermo Titus who stated the plan was for a nurse to be there Monday and patient to call if he had a nurse need before then. Writer called patient back gave him the above information and verified he had [facility] number to call if needed. Patient verbalized understanding.     Follow Up  Future Appointments   Date Time Provider Timothy Beal   3/1/2021 10:00 AM MD Kanchan Cuenca 3/2/2021  8:00 AM Sly End, PT [facility] [place] [facility]   3/4/2021  8:00 AM Sly End, PT [facility] [place] [facility]   3/9/2021  3:30 PM Sly End, PT [facility] [place] [facility]   3/11/2021  3:30 PM Sly End, PT [facility] [place] [facility]   3/16/2021  3:30 PM Sly End, PT [facility] [place] [facility]   3/18/2021  8:00 AM Sly End, PT [facility] [place] [facility]   3/19/2021 12:20 PM Jacqui Valles, APRN - CNP [facility] SLEEP MED [facility]   3/23/2021  8:00 AM Sly End, PT [facility] [place] [facility]   3/25/2021  8:00 AM Sly End, PT [facility] [place] [facility]   3/30/2021  8:00 AM Sly End, PT [facility] [place] [facility]   4/1/2021  8:00 AM Sly End, PT [facility] [place] [facility]   4/6/2021  8:00 AM Sly End, PT [facility] [place] [facility]   4/8/2021  8:00  [address] [facility] JAYV Pope RN

## 2021-02-22 ENCOUNTER — TELEPHONE (OUTPATIENT)
Dept: ORTHOPEDIC SURGERY | Age: 51
End: 2021-02-22

## 2021-02-24 ENCOUNTER — HOSPITAL ENCOUNTER (EMERGENCY)
Age: 51
Discharge: HOME | End: 2021-02-24
Attending: EMERGENCY MEDICINE
Payer: COMMERCIAL

## 2021-02-24 ENCOUNTER — APPOINTMENT (OUTPATIENT)
Dept: GENERAL RADIOLOGY | Age: 51
End: 2021-02-24
Payer: COMMERCIAL

## 2021-02-24 VITALS
TEMPERATURE: 98.2 F | DIASTOLIC BLOOD PRESSURE: 77 MMHG | HEIGHT: 72 IN | BODY MASS INDEX: 27.07 KG/M2 | OXYGEN SATURATION: 98 % | SYSTOLIC BLOOD PRESSURE: 120 MMHG | WEIGHT: 199.9 LBS | HEART RATE: 88 BPM | RESPIRATION RATE: 16 BRPM

## 2021-02-24 DIAGNOSIS — Z96.651 HISTORY OF TOTAL KNEE ARTHROPLASTY, RIGHT: ICD-10-CM

## 2021-02-24 DIAGNOSIS — Z48.89 ENCOUNTER FOR POST SURGICAL WOUND CHECK: Primary | ICD-10-CM

## 2021-02-24 LAB
A/G RATIO: 1.4 (ref 1.1–2.2)
ALBUMIN SERPL-MCNC: 3.7 G/DL (ref 3.4–5)
ALP BLD-CCNC: 109 U/L (ref 40–129)
ALT SERPL-CCNC: <5 U/L (ref 10–40)
ANION GAP SERPL CALCULATED.3IONS-SCNC: 11 MMOL/L (ref 3–16)
AST SERPL-CCNC: 13 U/L (ref 15–37)
BASOPHILS ABSOLUTE: 0.1 K/UL (ref 0–0.2)
BASOPHILS RELATIVE PERCENT: 0.9 %
BILIRUB SERPL-MCNC: 0.8 MG/DL (ref 0–1)
BILIRUBIN URINE: NEGATIVE
BLOOD, URINE: NEGATIVE
BUN BLDV-MCNC: 14 MG/DL (ref 7–20)
CALCIUM SERPL-MCNC: 9 MG/DL (ref 8.3–10.6)
CHLORIDE BLD-SCNC: 101 MMOL/L (ref 99–110)
CLARITY: CLEAR
CO2: 26 MMOL/L (ref 21–32)
COLOR: ABNORMAL
CREAT SERPL-MCNC: 1.1 MG/DL (ref 0.9–1.3)
EOSINOPHILS ABSOLUTE: 0.3 K/UL (ref 0–0.6)
EOSINOPHILS RELATIVE PERCENT: 2.5 %
GFR AFRICAN AMERICAN: >60
GFR NON-AFRICAN AMERICAN: >60
GLOBULIN: 2.6 G/DL
GLUCOSE BLD-MCNC: 104 MG/DL (ref 70–99)
GLUCOSE URINE: NEGATIVE MG/DL
HCT VFR BLD CALC: 28 % (ref 40.5–52.5)
HEMOGLOBIN: 9.1 G/DL (ref 13.5–17.5)
KETONES, URINE: NEGATIVE MG/DL
LEUKOCYTE ESTERASE, URINE: NEGATIVE
LIPASE: 22 U/L (ref 13–60)
LYMPHOCYTES ABSOLUTE: 3 K/UL (ref 1–5.1)
LYMPHOCYTES RELATIVE PERCENT: 29.7 %
MCH RBC QN AUTO: 28.6 PG (ref 26–34)
MCHC RBC AUTO-ENTMCNC: 32.4 G/DL (ref 31–36)
MCV RBC AUTO: 88.5 FL (ref 80–100)
MICROSCOPIC EXAMINATION: ABNORMAL
MONOCYTES ABSOLUTE: 0.7 K/UL (ref 0–1.3)
MONOCYTES RELATIVE PERCENT: 7.1 %
NEUTROPHILS ABSOLUTE: 6 K/UL (ref 1.7–7.7)
NEUTROPHILS RELATIVE PERCENT: 59.8 %
NITRITE, URINE: NEGATIVE
PDW BLD-RTO: 17.7 % (ref 12.4–15.4)
PH UA: 5.5 (ref 5–8)
PLATELET # BLD: 454 K/UL (ref 135–450)
PMV BLD AUTO: 7.6 FL (ref 5–10.5)
POTASSIUM REFLEX MAGNESIUM: 3.8 MMOL/L (ref 3.5–5.1)
PROTEIN UA: NEGATIVE MG/DL
RBC # BLD: 3.17 M/UL (ref 4.2–5.9)
SEDIMENTATION RATE, ERYTHROCYTE: 19 MM/HR (ref 0–15)
SODIUM BLD-SCNC: 138 MMOL/L (ref 136–145)
SPECIFIC GRAVITY UA: >=1.03 (ref 1–1.03)
TOTAL PROTEIN: 6.3 G/DL (ref 6.4–8.2)
URINE REFLEX TO CULTURE: ABNORMAL
URINE TYPE: ABNORMAL
UROBILINOGEN, URINE: 0.2 E.U./DL
WBC # BLD: 10.1 K/UL (ref 4–11)

## 2021-02-24 PROCEDURE — 36415 COLL VENOUS BLD VENIPUNCTURE: CPT

## 2021-02-24 PROCEDURE — 81003 URINALYSIS AUTO W/O SCOPE: CPT

## 2021-02-24 PROCEDURE — 83690 ASSAY OF LIPASE: CPT

## 2021-02-24 PROCEDURE — 80053 COMPREHEN METABOLIC PANEL: CPT

## 2021-02-24 PROCEDURE — 96374 THER/PROPH/DIAG INJ IV PUSH: CPT

## 2021-02-24 PROCEDURE — 6360000002 HC RX W HCPCS: Performed by: EMERGENCY MEDICINE

## 2021-02-24 PROCEDURE — 85652 RBC SED RATE AUTOMATED: CPT

## 2021-02-24 PROCEDURE — 86140 C-REACTIVE PROTEIN: CPT

## 2021-02-24 PROCEDURE — 85025 COMPLETE CBC W/AUTO DIFF WBC: CPT

## 2021-02-24 PROCEDURE — 73560 X-RAY EXAM OF KNEE 1 OR 2: CPT

## 2021-02-24 PROCEDURE — 6370000000 HC RX 637 (ALT 250 FOR IP): Performed by: EMERGENCY MEDICINE

## 2021-02-24 PROCEDURE — 99284 EMERGENCY DEPT VISIT MOD MDM: CPT

## 2021-02-24 RX ORDER — FENTANYL CITRATE 50 UG/ML
25 INJECTION, SOLUTION INTRAMUSCULAR; INTRAVENOUS ONCE
Status: COMPLETED | OUTPATIENT
Start: 2021-02-24 | End: 2021-02-24

## 2021-02-24 RX ORDER — OXYCODONE HYDROCHLORIDE AND ACETAMINOPHEN 5; 325 MG/1; MG/1
1 TABLET ORAL ONCE
Status: COMPLETED | OUTPATIENT
Start: 2021-02-24 | End: 2021-02-24

## 2021-02-24 RX ADMIN — OXYCODONE HYDROCHLORIDE AND ACETAMINOPHEN 1 TABLET: 5; 325 TABLET ORAL at 21:19

## 2021-02-24 RX ADMIN — FENTANYL CITRATE 25 MCG: 50 INJECTION INTRAMUSCULAR; INTRAVENOUS at 19:55

## 2021-02-24 ASSESSMENT — PAIN DESCRIPTION - FREQUENCY: FREQUENCY: CONTINUOUS

## 2021-02-24 ASSESSMENT — PAIN SCALES - GENERAL
PAINLEVEL_OUTOF10: 4
PAINLEVEL_OUTOF10: 3
PAINLEVEL_OUTOF10: 5
PAINLEVEL_OUTOF10: 5

## 2021-02-24 ASSESSMENT — PAIN DESCRIPTION - LOCATION
LOCATION: KNEE
LOCATION: KNEE

## 2021-02-24 ASSESSMENT — ENCOUNTER SYMPTOMS
WHEEZING: 0
VOMITING: 0
SHORTNESS OF BREATH: 0
RHINORRHEA: 0
NAUSEA: 0
COLOR CHANGE: 0
BACK PAIN: 0
PHOTOPHOBIA: 0

## 2021-02-24 ASSESSMENT — PAIN - FUNCTIONAL ASSESSMENT
PAIN_FUNCTIONAL_ASSESSMENT: PREVENTS OR INTERFERES SOME ACTIVE ACTIVITIES AND ADLS
PAIN_FUNCTIONAL_ASSESSMENT: PREVENTS OR INTERFERES SOME ACTIVE ACTIVITIES AND ADLS
PAIN_FUNCTIONAL_ASSESSMENT: 0-10

## 2021-02-24 ASSESSMENT — PAIN DESCRIPTION - PROGRESSION: CLINICAL_PROGRESSION: NOT CHANGED

## 2021-02-24 ASSESSMENT — PAIN DESCRIPTION - PAIN TYPE: TYPE: CHRONIC PAIN

## 2021-02-25 ENCOUNTER — OFFICE VISIT (OUTPATIENT)
Dept: ORTHOPEDIC SURGERY | Age: 51
End: 2021-02-25

## 2021-02-25 DIAGNOSIS — Z96.651 HISTORY OF TOTAL KNEE ARTHROPLASTY, RIGHT: Primary | ICD-10-CM

## 2021-02-25 LAB — C-REACTIVE PROTEIN: 57.9 MG/L (ref 0–5.1)

## 2021-02-25 PROCEDURE — 99024 POSTOP FOLLOW-UP VISIT: CPT | Performed by: ORTHOPAEDIC SURGERY

## 2021-02-25 RX ORDER — HYDROCODONE BITARTRATE AND ACETAMINOPHEN 5; 325 MG/1; MG/1
1-2 TABLET ORAL EVERY 4 HOURS PRN
Qty: 60 TABLET | Refills: 0 | Status: SHIPPED | OUTPATIENT
Start: 2021-02-25 | End: 2021-03-02 | Stop reason: SDUPTHER

## 2021-02-25 NOTE — ED PROVIDER NOTES
[address] [facility]  [facility]      Pt Name: Jonathan Chavez  MRN: [id number]  [place] 1970  Date ofevaluation: 2/24/2021  Provider: Jayden Delaney MD    CHIEF COMPLAINT       Chief Complaint   Patient presents with    Post-op Problem     R knee surgery at [address] [facility] [address] 1wk ago by Dr. Liliya Hui, new pain/swelling. HISTORY OF PRESENT ILLNESS   (Location/Symptom, Timing/Onset,Context/Setting, Quality, Duration, Modifying Factors, Severity)  Note limiting factors. Gastrointestinal: Negative for nausea and vomiting. Endocrine: Negative for polydipsia and polyuria. Genitourinary: Negative for difficulty urinating and frequency. Musculoskeletal: Positive for arthralgias and joint swelling. Negative for back pain, gait problem, neck pain and neck stiffness. Skin: Negative for color change and rash. Neurological: Negative for light-headedness and headaches. Psychiatric/Behavioral: Negative for confusion. The patient is not nervous/anxious. All other systems reviewed and are negative. Except as noted above the remainder of the review of systems was reviewed and negative.        PAST MEDICAL HISTORY     Past Medical History:   Diagnosis Date    Alcoholism [facility])     last drink 2015    Allergic migraine with status migrainosus     Allergic rhinitis, seasonal     Anemia     Arthritis     right knee    Vaughn's esophagus     Benign intracranial hypertension     Cancer (HCC)     renal     Carpal tunnel syndrome     Chronic pain     Depression     Depression     GERD (gastroesophageal reflux disease)     Headache(784.0)     History of blood transfusion     History of tobacco use     Quit 7/2014    Migraine     chronic for 1 year    Morbid obesity ([address]     Movement disorder     Onychomycosis     Sleep apnea, obstructive     Severe uses cpap stop bang 6    Unspecified cerebral artery occlusion with cerebral infarction 2014    slight weakness in left arm    Wears dentures     full set    Wears glasses          SURGICALHISTORY       Past Surgical History:   Procedure Laterality Date    ABDOMEN SURGERY      gastric bypass    ABDOMEN SURGERY  4-7-2016    repair of recurrent incisional hernia with mesh, removal of old mesh, bilateral component separation    ABDOMINAL EXPLORATION SURGERY  1/11/16    exp lap, lysis of adhesions, small bowel resection    CARPAL TUNNEL RELEASE      bilat    DILATATION, ESOPHAGUS      ENDOSCOPY, COLON, DIAGNOSTIC [address]      GASTRIC BYPASS SURGERY  Jan 2009    Has lost about 200 pounds.  HERNIA REPAIR  3-    ventral    JOINT REPLACEMENT      KIDNEY REMOVAL Left 08/01/2018    KNEE ARTHROSCOPY Right 7/11/2013    Dr.Robert Sanders     KNEE ARTHROSCOPY Right 7/11/12    RIGHT KNEE ARTHROSCOPY WITH CHONDROPLASTY    KNEE SURGERY  July 2012    right, arthroscopy    KNEE SURGERY Right 2/18/2020    INCISION AND DRAINAGE RIGHT KNEE AND WOULD VAC PLACEMENT performed by Lisa Mena MD at [address]  2005    OTHER SURGICAL HISTORY Left 03/08/2016    CT biopsy ablasion left kidney    OTHER SURGICAL HISTORY  2016    small intestine 12 inch removed, 2 hernia surgeries, another surgery to drain infection from incision.  REVISION TOTAL KNEE ARTHROPLASTY Right 12/17/2019    RIGHT REVISION TIBIA TOTAL KNEE REPLACEMENT performed by Lisa Mena MD at [address] [facility] Right 1/22/2020    RIGHT KNEE IRRIGATION AND DEBRIDEMENT WITH POLY EXCHANGE performed by Kevin Alaniz MD at [address] [facility] Right 2/16/2021    RIGHT REMOVAL OF EXPLANT AND TOTAL KNEE REPLACEMENT [facility] [address] [facility]  10/15/13    RIGHT    TOTAL KNEE ARTHROPLASTY Right 8/12/2020    RIGHT ARTHROPLASY  RESECTION WITH INSERTION OF SPACER performed by Lisa Mena MD at [address]  6-7-2016    UPPER GASTROINTESTINAL ENDOSCOPY  04/02/2018         CURRENT MEDICATIONS       Previous Medications    ASPIRIN 81 MG EC TABLET    Take 1 tablet by mouth daily for 14 days Take twice a day for 14 days after knee surgery then resume daily dosing.     ATORVASTATIN (LIPITOR) 40 MG TABLET    Take 1 tablet by mouth nightly At bedtime    CALCIUM-VITAMIN D (OSCAL 500/200 D-3) 500-200 MG-UNIT PER TABLET    Take 1 tablet by mouth 2 times daily DEXTROSE 5 % SOLN 50 ML WITH KETAMINE 100 MG/ML SOLN 50 MG    Infuse intravenously continuous Indications: 350 mg once every 3 months    DOXYCYCLINE HYCLATE (VIBRAMYCIN) 100 MG CAPSULE    Take 1 capsule by mouth 2 times daily    FLUOXETINE (PROZAC) 20 MG CAPSULE    TAKE 1 CAPSULE BY MOUTH DAILY    HYDROCODONE-ACETAMINOPHEN (NORCO) 5-325 MG PER TABLET    Take 1-2 tablets by mouth every 4 hours as needed for Pain for up to 5 days. MULTIPLE VITAMIN TABS    Take 1 tablet by mouth daily     ONDANSETRON (ZOFRAN-ODT) 4 MG DISINTEGRATING TABLET    Take 4 mg by mouth every 8 hours as needed    PANTOPRAZOLE (PROTONIX) 40 MG TABLET    Take 1 tablet by mouth 2 times daily    SILDENAFIL (REVATIO) 20 MG TABLET    Take 4 tablets by mouth as needed (30 min prior to intercourse)    TRAZODONE (DESYREL) 100 MG TABLET    Take 2 tablets by mouth nightly    ZOLPIDEM (AMBIEN) 10 MG TABLET    Take 10 mg by mouth nightly as needed. 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    Social Needs    Financial resource strain: Not on file    Food insecurity     Worry: Not on file     Inability: Not on file    Transportation needs     Medical: Not on file     Non-medical: Not on file   Tobacco Use    Smoking status: Former Smoker     Packs/day: 0.50     Years: 25.00     Pack years: 12.50     Types: Cigarettes     Quit date: 7/31/2017 Years since quitting: 3.5    Smokeless tobacco: Never Used   Substance and Sexual Activity    Alcohol use: No     Alcohol/week: 0.0 standard drinks     Comment: last drink 2015    Drug use: Never    Sexual activity: Yes     Partners: Female     Comment: wife [place]   Lifestyle    Physical activity     Days per week: Not on file     Minutes per session: Not on file    Stress: Not on file   Relationships    Social connections     Talks on phone: Not on file     Gets together: Not on file     Attends Yazidi service: Not on file     Active member of club or organization: Not on file     Attends meetings of clubs or organizations: Not on file     Relationship status: Not on file    Intimate partner violence     Fear of current or ex partner: Not on file     Emotionally abused: Not on file     Physically abused: Not on file     Forced sexual activity: Not on file   Other Topics Concern    Not on file   Social History Narrative    Not on file       SCREENINGS             PHYSICAL EXAM    (up to 7 for level 4, 8 or more for level 5)     ED Triage Vitals [02/24/21 1911]   BP Temp Temp Source Pulse Resp SpO2 Height Weight   121/75 98.2 °F (36.8 °C) Oral 85 16 96 % 6' (1.829 m) 199 lb 14.4 oz (90.7 kg)       Physical Exam  Vitals signs and nursing note reviewed. Constitutional:       General: He is not in acute distress. Appearance: He is well-developed. HENT:      Head: Normocephalic and atraumatic. Eyes:      Conjunctiva/sclera: Conjunctivae normal.   Neck:      Musculoskeletal: Normal range of motion. Trachea: No tracheal deviation. Cardiovascular:      Rate and Rhythm: Normal rate and regular rhythm. Pulmonary:      Effort: Pulmonary effort is normal.      Breath sounds: Normal breath sounds. No wheezing or rales. Abdominal:      General: There is no distension. Palpations: Abdomen is soft. Tenderness: There is no abdominal tenderness. Musculoskeletal: Normal range of motion. General: Swelling present. No deformity. Comments: See attached photos   Skin:     General: Skin is warm and dry. Capillary Refill: Capillary refill takes less than 2 seconds. Neurological:      Mental Status: He is alert and oriented to person, place, and time. Sensory: No sensory deficit. Motor: No weakness. RESULTS     EKG: All EKG's are interpreted by the Emergency Department Physician who either signs or Co-signsthis chart in the absence of a cardiologist.    RADIOLOGY:   [facility] such as CT, Ultrasound and MRI are read by the radiologist. Plain radiographic images are visualized and preliminarily interpreted by the emergency physician with the below findings:      Interpretation per the Radiologist below, if available at the time ofthis note:    XR KNEE RIGHT (1-2 VIEWS)    (Results Pending)         ED BEDSIDE ULTRASOUND:   Performed by ED Physician - none    LABS:  Labs Reviewed   CBC WITH AUTO DIFFERENTIAL   COMPREHENSIVE METABOLIC PANEL W/ REFLEX TO MG FOR LOW K   LIPASE   C-REACTIVE PROTEIN   SEDIMENTATION RATE   URINE RT REFLEX TO CULTURE       All other labs were within normal range or not returned as of this dictation. EMERGENCY DEPARTMENT COURSE and DIFFERENTIAL DIAGNOSIS/MDM:   Vitals:    Vitals:    02/24/21 1911   BP: 121/75   Pulse: 85   Resp: 16   Temp: 98.2 °F (36.8 °C)   TempSrc: Oral   SpO2: 96%   Weight: 199 lb 14.4 oz (90.7 kg)   Height: 6' (1.829 m)       Patient was given thefollowing medications:  Medications - No data to display    ED COURSE & MEDICAL DECISION MAKING    Pertinent Labs & Imaging studies reviewed. (See chart for details)   -  Patient seen and evaluated in the emergency department. -  Triage and nursing notes reviewed and incorporated. -  Old chart records reviewed and incorporated. -  Differential diagnosis includes: Differential diagnosis: includes but not limited to Arterial Injury/Ischemia, Fracture, Dislocation, Infection, Compartment Syndrome, Neurologic Deficit/Injury. -  Work-up included:  See above  -  ED treatment included: See above  -  Results discussed with patient. Labs show slightly elevated ESR. No leukocytosis. Imaging studies show postoperative changes, without any acute abnormalities. Patient feels well on reevaluation. I did speak with the patient's orthopedist Dr. Karan Jiang and after reviewing laboratory results and imaging, he had the patient was safe for discharge home with outpatient follow-up. Low suspicion for infection at this time. This was discussed with the patient. He does have a refill of his pain medications currently pending, and was given a dose of oral medications before discharge. The patient is agreeable with plan of care and disposition. REASSESSMENT          CRITICAL CARE TIME   Total Critical Care time was 0 minutes, excluding separatelyreportable procedures. There was a high probability ofclinically significant/life threatening deterioration in the patient's condition which required my urgent intervention. CONSULTS:  None    PROCEDURES:  Unless otherwise noted below, none     Procedures    FINAL IMPRESSION      1. Encounter for post surgical wound check          DISPOSITION/PLAN   DISPOSITION        PATIENT REFERREDTO:  No follow-up provider specified.     DISCHARGEMEDICATIONS:  New Prescriptions    No medications on file          (Please note that portions of this note were completed with a voice recognition program.  Efforts were made to edit the dictations but occasionally words are mis-transcribed.)    Alicja Upton MD (electronically signed)  Attending Emergency Physician          Alicja Upton MD  02/25/21 [id number]

## 2021-02-25 NOTE — PROGRESS NOTES
[facility]  Office Visit    Chief Complaint: Follow-up s/p right total knee arthroplasty    HPI:  Jose Angel Jones is a 53 y.o. who is here in follow-up of revision right total knee arthroplasty performed on 2/16/2021. He comes in earlier than scheduled postoperatively for wound drainage. He has a small palpable medial to his incision that has been draining blood. He is also out of his pain medication. 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  Chondromalacia of patellofemoral joint    Chronic pain syndrome    History of stroke    Gastroesophageal reflux disease with esophagitis--on daily ppi    Intractable chronic migraine without aura and with status migrainosus    Iron deficiency anemia    B12 deficiency    Malignant neoplasm of left kidney (HCC) clear cell. 8/1/18 Dr Lico Bazan.     Failed total knee, right, initial encounter ([facility] [address]    Infection of total knee replacement ([address]    History of depression    History of alcoholism ([address]    Infection of prosthetic right knee joint ([address]    History of total knee arthroplasty, right       ROS:  Constitutional: denies fever, chills, weight loss  MSK: denies pain in other joints, muscle aches  Neurological: denies numbness, tingling, weakness    Medications:  Current Outpatient Medications on File Prior to Visit   Medication Sig Dispense Refill  HYDROcodone-acetaminophen (NORCO) 5-325 MG per tablet Take 1-2 tablets by mouth every 4 hours as needed for Pain for up to 5 days. 60 tablet 0    ondansetron (ZOFRAN-ODT) 4 MG disintegrating tablet Take 4 mg by mouth every 8 hours as needed      zolpidem (AMBIEN) 10 MG tablet Take 10 mg by mouth nightly as needed.  doxycycline hyclate (VIBRAMYCIN) 100 MG capsule Take 1 capsule by mouth 2 times daily 60 capsule 1    aspirin 81 MG EC tablet Take 1 tablet by mouth daily for 14 days Take twice a day for 14 days after knee surgery then resume daily dosing. 28 tablet 0    sildenafil (REVATIO) 20 MG tablet Take 4 tablets by mouth as needed (30 min prior to intercourse) 30 tablet 0    FLUoxetine (PROZAC) 20 MG capsule TAKE 1 CAPSULE BY MOUTH DAILY 90 capsule 1    traZODone (DESYREL) 100 MG tablet Take 2 tablets by mouth nightly 180 tablet 0    atorvastatin (LIPITOR) 40 MG tablet Take 1 tablet by mouth nightly At bedtime 90 tablet 0    dextrose 5 % SOLN 50 mL with ketamine 100 MG/ML SOLN 50 mg Infuse intravenously continuous Indications: 350 mg once every 3 months      pantoprazole (PROTONIX) 40 MG tablet Take 1 tablet by mouth 2 times daily      calcium-vitamin D (OSCAL 500/200 D-3) 500-200 MG-UNIT per tablet Take 1 tablet by mouth 2 times daily       Multiple Vitamin TABS Take 1 tablet by mouth daily        No current facility-administered medications on file prior to visit. Exam:  There were no vitals taken for this visit. Appearance: sitting in exam room chair, appears to be in no acute distress, awake and alert  Resp: unlabored breathing on room air  Skin: warm, dry and intact with out erythema or significant increased temperature  Neuro: grossly intact both lower extremities. Intact sensation to light touch. Motor exam 4+ to 5/5 in all major motor groups. Right knee: Incision is healing as expected with no erythema. He has a large prepatellar hematoma. There is a 1 cm incision medial to the midline incision that is covered with Prineo that has some drainage out of it. Valgus stress and stable to anterior and posterior drawer sign. Sensation is intact light touch. There is brisk capillary refill. Dorsalis pedis and posterior tibial pulses are intact. There is 5/5 muscle strength in all muscle groups. Imaging:  Right knee radiographs performed yesterday were reviewed. Significant for revision total knee arthroplasty prosthesis in place with no signs of osteolysis, loosening, fracture, or dislocation. Assessment:  S/p revision right total knee arthroplasty    Plan:  He has a large prepatellar hematoma that is starting to drain out of a small wound medial to his midline incision. After verbal consent was obtained, the right knee was prepped with Betadine. This was followed by an aspiration of 150 mL of bloody fluid. The patient tolerated procedure well and there were no complications. Small wound was prepped with alcohol and a 4-0 nylon suture was placed across it to help stop the drainage. Gauze and an Ace wrap were then applied. He remains on antibiotics per infectious disease for his history of right knee prosthetic joint infection. Hydrocodone was refilled today. I will see him back next week for evaluation of his healing and his wound drainage. This dictation was done with Dragon dictation and may contain mechanical errors related to translation.

## 2021-02-26 ENCOUNTER — OFFICE VISIT (OUTPATIENT)
Dept: ORTHOPEDIC SURGERY | Age: 51
End: 2021-02-26

## 2021-02-26 ENCOUNTER — ANESTHESIA (OUTPATIENT)
Dept: OPERATING ROOM | Age: 51
End: 2021-02-26
Payer: COMMERCIAL

## 2021-02-26 ENCOUNTER — ANESTHESIA EVENT (OUTPATIENT)
Dept: OPERATING ROOM | Age: 51
End: 2021-02-26
Payer: COMMERCIAL

## 2021-02-26 ENCOUNTER — TELEPHONE (OUTPATIENT)
Dept: ORTHOPEDIC SURGERY | Age: 51
End: 2021-02-26

## 2021-02-26 ENCOUNTER — PREP FOR PROCEDURE (OUTPATIENT)
Dept: ORTHOPEDIC SURGERY | Age: 51
End: 2021-02-26

## 2021-02-26 ENCOUNTER — CARE COORDINATION (OUTPATIENT)
Dept: CASE MANAGEMENT | Age: 51
End: 2021-02-26

## 2021-02-26 ENCOUNTER — HOSPITAL ENCOUNTER (OUTPATIENT)
Age: 51
Discharge: HOME | End: 2021-02-27
Attending: ORTHOPAEDIC SURGERY | Admitting: ORTHOPAEDIC SURGERY
Payer: COMMERCIAL

## 2021-02-26 VITALS — TEMPERATURE: 98.1 F

## 2021-02-26 VITALS
OXYGEN SATURATION: 100 % | SYSTOLIC BLOOD PRESSURE: 126 MMHG | TEMPERATURE: 96.1 F | RESPIRATION RATE: 1 BRPM | DIASTOLIC BLOOD PRESSURE: 73 MMHG

## 2021-02-26 DIAGNOSIS — Z41.9 SURGERY, ELECTIVE: Primary | ICD-10-CM

## 2021-02-26 DIAGNOSIS — Z96.651 HISTORY OF TOTAL KNEE ARTHROPLASTY, RIGHT: ICD-10-CM

## 2021-02-26 PROBLEM — S80.01XA HEMATOMA OF RIGHT KNEE REGION: Status: ACTIVE | Noted: 2021-02-26

## 2021-02-26 LAB
GLUCOSE BLD-MCNC: 120 MG/DL (ref 70–99)
GLUCOSE BLD-MCNC: 165 MG/DL (ref 70–99)
PERFORMED ON: ABNORMAL
PERFORMED ON: ABNORMAL
SARS-COV-2, NAAT: NOT DETECTED

## 2021-02-26 PROCEDURE — 2580000003 HC RX 258: Performed by: ORTHOPAEDIC SURGERY

## 2021-02-26 PROCEDURE — 3700000001 HC ADD 15 MINUTES (ANESTHESIA): Performed by: ORTHOPAEDIC SURGERY

## 2021-02-26 PROCEDURE — 2709999900 HC NON-CHARGEABLE SUPPLY: Performed by: ORTHOPAEDIC SURGERY

## 2021-02-26 PROCEDURE — 3600000014 HC SURGERY LEVEL 4 ADDTL 15MIN: Performed by: ORTHOPAEDIC SURGERY

## 2021-02-26 PROCEDURE — 6370000000 HC RX 637 (ALT 250 FOR IP): Performed by: ORTHOPAEDIC SURGERY

## 2021-02-26 PROCEDURE — 6360000002 HC RX W HCPCS: Performed by: ORTHOPAEDIC SURGERY

## 2021-02-26 PROCEDURE — 6360000002 HC RX W HCPCS: Performed by: ANESTHESIOLOGY

## 2021-02-26 PROCEDURE — 3600000004 HC SURGERY LEVEL 4 BASE: Performed by: ORTHOPAEDIC SURGERY

## 2021-02-26 PROCEDURE — 87205 SMEAR GRAM STAIN: CPT

## 2021-02-26 PROCEDURE — 87635 SARS-COV-2 COVID-19 AMP PRB: CPT

## 2021-02-26 PROCEDURE — 6360000002 HC RX W HCPCS

## 2021-02-26 PROCEDURE — 7100000000 HC PACU RECOVERY - FIRST 15 MIN: Performed by: ORTHOPAEDIC SURGERY

## 2021-02-26 PROCEDURE — 87070 CULTURE OTHR SPECIMN AEROBIC: CPT

## 2021-02-26 PROCEDURE — 99024 POSTOP FOLLOW-UP VISIT: CPT | Performed by: ORTHOPAEDIC SURGERY

## 2021-02-26 PROCEDURE — 2580000003 HC RX 258: Performed by: ANESTHESIOLOGY

## 2021-02-26 PROCEDURE — 3700000000 HC ANESTHESIA ATTENDED CARE: Performed by: ORTHOPAEDIC SURGERY

## 2021-02-26 PROCEDURE — 94761 N-INVAS EAR/PLS OXIMETRY MLT: CPT

## 2021-02-26 PROCEDURE — 2500000003 HC RX 250 WO HCPCS: Performed by: ORTHOPAEDIC SURGERY

## 2021-02-26 PROCEDURE — 2500000003 HC RX 250 WO HCPCS: Performed by: NURSE ANESTHETIST, CERTIFIED REGISTERED

## 2021-02-26 PROCEDURE — 87075 CULTR BACTERIA EXCEPT BLOOD: CPT

## 2021-02-26 PROCEDURE — 6360000002 HC RX W HCPCS: Performed by: NURSE ANESTHETIST, CERTIFIED REGISTERED

## 2021-02-26 PROCEDURE — 7100000001 HC PACU RECOVERY - ADDTL 15 MIN: Performed by: ORTHOPAEDIC SURGERY

## 2021-02-26 PROCEDURE — 6360000002 HC RX W HCPCS: Performed by: NURSE PRACTITIONER

## 2021-02-26 RX ORDER — SENNA AND DOCUSATE SODIUM 50; 8.6 MG/1; MG/1
1 TABLET, FILM COATED ORAL 2 TIMES DAILY
Status: DISCONTINUED | OUTPATIENT
Start: 2021-02-26 | End: 2021-02-27 | Stop reason: HOSPADM

## 2021-02-26 RX ORDER — TRAZODONE HYDROCHLORIDE 100 MG/1
200 TABLET ORAL NIGHTLY
Status: DISCONTINUED | OUTPATIENT
Start: 2021-02-26 | End: 2021-02-27 | Stop reason: HOSPADM

## 2021-02-26 RX ORDER — SODIUM CHLORIDE 0.9 % (FLUSH) 0.9 %
10 SYRINGE (ML) INJECTION PRN
Status: DISCONTINUED | OUTPATIENT
Start: 2021-02-26 | End: 2021-02-27 | Stop reason: HOSPADM

## 2021-02-26 RX ORDER — GLYCOPYRROLATE 0.2 MG/ML
INJECTION INTRAMUSCULAR; INTRAVENOUS PRN
Status: DISCONTINUED | OUTPATIENT
Start: 2021-02-26 | End: 2021-02-26 | Stop reason: SDUPTHER

## 2021-02-26 RX ORDER — SODIUM CHLORIDE 0.9 % (FLUSH) 0.9 %
10 SYRINGE (ML) INJECTION EVERY 12 HOURS SCHEDULED
Status: DISCONTINUED | OUTPATIENT
Start: 2021-02-26 | End: 2021-02-27 | Stop reason: HOSPADM

## 2021-02-26 RX ORDER — MORPHINE SULFATE 2 MG/ML
2 INJECTION, SOLUTION INTRAMUSCULAR; INTRAVENOUS
Status: DISCONTINUED | OUTPATIENT
Start: 2021-02-26 | End: 2021-02-26

## 2021-02-26 RX ORDER — DEXAMETHASONE SODIUM PHOSPHATE 4 MG/ML
INJECTION, SOLUTION INTRA-ARTICULAR; INTRALESIONAL; INTRAMUSCULAR; INTRAVENOUS; SOFT TISSUE PRN
Status: DISCONTINUED | OUTPATIENT
Start: 2021-02-26 | End: 2021-02-26 | Stop reason: SDUPTHER

## 2021-02-26 RX ORDER — ONDANSETRON 2 MG/ML
INJECTION INTRAMUSCULAR; INTRAVENOUS PRN
Status: DISCONTINUED | OUTPATIENT
Start: 2021-02-26 | End: 2021-02-26 | Stop reason: SDUPTHER

## 2021-02-26 RX ORDER — OXYCODONE HYDROCHLORIDE 5 MG/1
5 TABLET ORAL EVERY 4 HOURS PRN
Status: DISCONTINUED | OUTPATIENT
Start: 2021-02-26 | End: 2021-02-27 | Stop reason: HOSPADM

## 2021-02-26 RX ORDER — FENTANYL CITRATE 50 UG/ML
INJECTION, SOLUTION INTRAMUSCULAR; INTRAVENOUS PRN
Status: DISCONTINUED | OUTPATIENT
Start: 2021-02-26 | End: 2021-02-26 | Stop reason: SDUPTHER

## 2021-02-26 RX ORDER — SODIUM CHLORIDE 0.9 % (FLUSH) 0.9 %
10 SYRINGE (ML) INJECTION PRN
Status: DISCONTINUED | OUTPATIENT
Start: 2021-02-26 | End: 2021-02-26 | Stop reason: HOSPADM

## 2021-02-26 RX ORDER — NICOTINE POLACRILEX 4 MG
15 LOZENGE BUCCAL PRN
Status: DISCONTINUED | OUTPATIENT
Start: 2021-02-26 | End: 2021-02-27 | Stop reason: HOSPADM

## 2021-02-26 RX ORDER — MIDAZOLAM HYDROCHLORIDE 1 MG/ML
2 INJECTION INTRAMUSCULAR; INTRAVENOUS ONCE
Status: COMPLETED | OUTPATIENT
Start: 2021-02-26 | End: 2021-02-26

## 2021-02-26 RX ORDER — OXYCODONE HYDROCHLORIDE AND ACETAMINOPHEN 5; 325 MG/1; MG/1
1 TABLET ORAL PRN
Status: DISCONTINUED | OUTPATIENT
Start: 2021-02-26 | End: 2021-02-26 | Stop reason: HOSPADM

## 2021-02-26 RX ORDER — OXYCODONE HYDROCHLORIDE AND ACETAMINOPHEN 5; 325 MG/1; MG/1
2 TABLET ORAL PRN
Status: DISCONTINUED | OUTPATIENT
Start: 2021-02-26 | End: 2021-02-26 | Stop reason: HOSPADM

## 2021-02-26 RX ORDER — INSULIN GLARGINE 100 [IU]/ML
0.25 INJECTION, SOLUTION SUBCUTANEOUS NIGHTLY
Status: DISCONTINUED | OUTPATIENT
Start: 2021-02-26 | End: 2021-02-27 | Stop reason: HOSPADM

## 2021-02-26 RX ORDER — ONDANSETRON 2 MG/ML
4 INJECTION INTRAMUSCULAR; INTRAVENOUS EVERY 6 HOURS PRN
Status: DISCONTINUED | OUTPATIENT
Start: 2021-02-26 | End: 2021-02-27 | Stop reason: HOSPADM

## 2021-02-26 RX ORDER — ONDANSETRON 2 MG/ML
4 INJECTION INTRAMUSCULAR; INTRAVENOUS
Status: DISCONTINUED | OUTPATIENT
Start: 2021-02-26 | End: 2021-02-26 | Stop reason: HOSPADM

## 2021-02-26 RX ORDER — LABETALOL HYDROCHLORIDE 5 MG/ML
5 INJECTION, SOLUTION INTRAVENOUS EVERY 10 MIN PRN
Status: DISCONTINUED | OUTPATIENT
Start: 2021-02-26 | End: 2021-02-26 | Stop reason: HOSPADM

## 2021-02-26 RX ORDER — PROPOFOL 10 MG/ML
INJECTION, EMULSION INTRAVENOUS PRN
Status: DISCONTINUED | OUTPATIENT
Start: 2021-02-26 | End: 2021-02-26 | Stop reason: SDUPTHER

## 2021-02-26 RX ORDER — PANTOPRAZOLE SODIUM 40 MG/1
40 TABLET, DELAYED RELEASE ORAL 2 TIMES DAILY
Status: DISCONTINUED | OUTPATIENT
Start: 2021-02-26 | End: 2021-02-27 | Stop reason: HOSPADM

## 2021-02-26 RX ORDER — ONDANSETRON 4 MG/1
4 TABLET, ORALLY DISINTEGRATING ORAL EVERY 8 HOURS PRN
Status: DISCONTINUED | OUTPATIENT
Start: 2021-02-26 | End: 2021-02-27 | Stop reason: HOSPADM

## 2021-02-26 RX ORDER — SODIUM CHLORIDE 9 MG/ML
INJECTION, SOLUTION INTRAVENOUS CONTINUOUS
Status: DISCONTINUED | OUTPATIENT
Start: 2021-02-26 | End: 2021-02-26

## 2021-02-26 RX ORDER — SODIUM CHLORIDE 0.9 % (FLUSH) 0.9 %
10 SYRINGE (ML) INJECTION EVERY 12 HOURS SCHEDULED
Status: DISCONTINUED | OUTPATIENT
Start: 2021-02-26 | End: 2021-02-26 | Stop reason: HOSPADM

## 2021-02-26 RX ORDER — DEXTROSE MONOHYDRATE 25 G/50ML
12.5 INJECTION, SOLUTION INTRAVENOUS PRN
Status: DISCONTINUED | OUTPATIENT
Start: 2021-02-26 | End: 2021-02-27 | Stop reason: HOSPADM

## 2021-02-26 RX ORDER — MEPERIDINE HYDROCHLORIDE 25 MG/ML
12.5 INJECTION INTRAMUSCULAR; INTRAVENOUS; SUBCUTANEOUS EVERY 5 MIN PRN
Status: DISCONTINUED | OUTPATIENT
Start: 2021-02-26 | End: 2021-02-26 | Stop reason: HOSPADM

## 2021-02-26 RX ORDER — DEXTROSE MONOHYDRATE 50 MG/ML
100 INJECTION, SOLUTION INTRAVENOUS PRN
Status: DISCONTINUED | OUTPATIENT
Start: 2021-02-26 | End: 2021-02-27 | Stop reason: HOSPADM

## 2021-02-26 RX ORDER — ATORVASTATIN CALCIUM 40 MG/1
40 TABLET, FILM COATED ORAL NIGHTLY
Status: DISCONTINUED | OUTPATIENT
Start: 2021-02-26 | End: 2021-02-27 | Stop reason: HOSPADM

## 2021-02-26 RX ORDER — OXYCODONE HYDROCHLORIDE 10 MG/1
10 TABLET ORAL EVERY 4 HOURS PRN
Status: DISCONTINUED | OUTPATIENT
Start: 2021-02-26 | End: 2021-02-27 | Stop reason: HOSPADM

## 2021-02-26 RX ORDER — ACETAMINOPHEN 325 MG/1
650 TABLET ORAL EVERY 6 HOURS
Status: DISCONTINUED | OUTPATIENT
Start: 2021-02-26 | End: 2021-02-27 | Stop reason: HOSPADM

## 2021-02-26 RX ORDER — FLUOXETINE HYDROCHLORIDE 20 MG/1
20 CAPSULE ORAL DAILY
Status: DISCONTINUED | OUTPATIENT
Start: 2021-02-27 | End: 2021-02-27 | Stop reason: HOSPADM

## 2021-02-26 RX ORDER — MAGNESIUM HYDROXIDE 1200 MG/15ML
LIQUID ORAL CONTINUOUS PRN
Status: COMPLETED | OUTPATIENT
Start: 2021-02-26 | End: 2021-02-26

## 2021-02-26 RX ORDER — LIDOCAINE HYDROCHLORIDE 20 MG/ML
INJECTION, SOLUTION EPIDURAL; INFILTRATION; INTRACAUDAL; PERINEURAL PRN
Status: DISCONTINUED | OUTPATIENT
Start: 2021-02-26 | End: 2021-02-26 | Stop reason: SDUPTHER

## 2021-02-26 RX ORDER — SODIUM CHLORIDE 450 MG/100ML
INJECTION, SOLUTION INTRAVENOUS CONTINUOUS
Status: DISCONTINUED | OUTPATIENT
Start: 2021-02-26 | End: 2021-02-27 | Stop reason: HOSPADM

## 2021-02-26 RX ORDER — MORPHINE SULFATE 4 MG/ML
4 INJECTION, SOLUTION INTRAMUSCULAR; INTRAVENOUS
Status: DISCONTINUED | OUTPATIENT
Start: 2021-02-26 | End: 2021-02-26

## 2021-02-26 RX ORDER — MIDAZOLAM HYDROCHLORIDE 1 MG/ML
INJECTION INTRAMUSCULAR; INTRAVENOUS
Status: COMPLETED
Start: 2021-02-26 | End: 2021-02-26

## 2021-02-26 RX ORDER — MIDAZOLAM HYDROCHLORIDE 1 MG/ML
INJECTION INTRAMUSCULAR; INTRAVENOUS PRN
Status: DISCONTINUED | OUTPATIENT
Start: 2021-02-26 | End: 2021-02-26 | Stop reason: SDUPTHER

## 2021-02-26 RX ORDER — ATORVASTATIN CALCIUM 40 MG/1
40 TABLET, FILM COATED ORAL NIGHTLY
Status: DISCONTINUED | OUTPATIENT
Start: 2021-02-27 | End: 2021-02-26

## 2021-02-26 RX ORDER — HYDRALAZINE HYDROCHLORIDE 20 MG/ML
5 INJECTION INTRAMUSCULAR; INTRAVENOUS
Status: DISCONTINUED | OUTPATIENT
Start: 2021-02-26 | End: 2021-02-26 | Stop reason: HOSPADM

## 2021-02-26 RX ORDER — ASPIRIN 81 MG/1
81 TABLET ORAL 2 TIMES DAILY
Status: DISCONTINUED | OUTPATIENT
Start: 2021-02-27 | End: 2021-02-27 | Stop reason: HOSPADM

## 2021-02-26 RX ORDER — BUPIVACAINE HYDROCHLORIDE 5 MG/ML
INJECTION, SOLUTION EPIDURAL; INTRACAUDAL
Status: COMPLETED | OUTPATIENT
Start: 2021-02-26 | End: 2021-02-26

## 2021-02-26 RX ADMIN — PROPOFOL 50 MG: 10 INJECTION, EMULSION INTRAVENOUS at 14:41

## 2021-02-26 RX ADMIN — MIDAZOLAM HYDROCHLORIDE 2 MG: 1 INJECTION INTRAMUSCULAR; INTRAVENOUS at 13:44

## 2021-02-26 RX ADMIN — GLYCOPYRROLATE 0.2 MG: 0.2 INJECTION, SOLUTION INTRAMUSCULAR; INTRAVENOUS at 14:30

## 2021-02-26 RX ADMIN — HYDROMORPHONE HYDROCHLORIDE 0.5 MG: 1 INJECTION, SOLUTION INTRAMUSCULAR; INTRAVENOUS; SUBCUTANEOUS at 23:38

## 2021-02-26 RX ADMIN — OXYCODONE HYDROCHLORIDE 10 MG: 10 TABLET ORAL at 22:30

## 2021-02-26 RX ADMIN — HYDROMORPHONE HYDROCHLORIDE 0.5 MG: 1 INJECTION, SOLUTION INTRAMUSCULAR; INTRAVENOUS; SUBCUTANEOUS at 15:37

## 2021-02-26 RX ADMIN — HYDROMORPHONE HYDROCHLORIDE 0.5 MG: 1 INJECTION, SOLUTION INTRAMUSCULAR; INTRAVENOUS; SUBCUTANEOUS at 20:18

## 2021-02-26 RX ADMIN — FENTANYL CITRATE 100 MCG: 50 INJECTION INTRAMUSCULAR; INTRAVENOUS at 14:21

## 2021-02-26 RX ADMIN — OXYCODONE HYDROCHLORIDE 10 MG: 10 TABLET ORAL at 17:35

## 2021-02-26 RX ADMIN — DEXAMETHASONE SODIUM PHOSPHATE 8 MG: 4 INJECTION, SOLUTION INTRAMUSCULAR; INTRAVENOUS at 14:30

## 2021-02-26 RX ADMIN — ACETAMINOPHEN 650 MG: 325 TABLET ORAL at 23:38

## 2021-02-26 RX ADMIN — ONDANSETRON 4 MG: 2 INJECTION INTRAMUSCULAR; INTRAVENOUS at 14:30

## 2021-02-26 RX ADMIN — HYDROMORPHONE HYDROCHLORIDE 0.5 MG: 1 INJECTION, SOLUTION INTRAMUSCULAR; INTRAVENOUS; SUBCUTANEOUS at 15:47

## 2021-02-26 RX ADMIN — TRAZODONE HYDROCHLORIDE 200 MG: 100 TABLET ORAL at 22:30

## 2021-02-26 RX ADMIN — SODIUM CHLORIDE: 9 INJECTION, SOLUTION INTRAVENOUS at 13:41

## 2021-02-26 RX ADMIN — HYDROMORPHONE HYDROCHLORIDE 0.5 MG: 1 INJECTION, SOLUTION INTRAMUSCULAR; INTRAVENOUS; SUBCUTANEOUS at 15:20

## 2021-02-26 RX ADMIN — CEFAZOLIN SODIUM 2000 MG: 10 INJECTION, POWDER, FOR SOLUTION INTRAVENOUS at 20:54

## 2021-02-26 RX ADMIN — MIDAZOLAM 2 MG: 1 INJECTION INTRAMUSCULAR; INTRAVENOUS at 14:20

## 2021-02-26 RX ADMIN — DOCUSATE SODIUM 50 MG AND SENNOSIDES 8.6 MG 1 TABLET: 8.6; 5 TABLET, FILM COATED ORAL at 20:54

## 2021-02-26 RX ADMIN — ACETAMINOPHEN 650 MG: 325 TABLET ORAL at 17:35

## 2021-02-26 RX ADMIN — PROPOFOL 300 MG: 10 INJECTION, EMULSION INTRAVENOUS at 14:24

## 2021-02-26 RX ADMIN — LIDOCAINE HYDROCHLORIDE 60 MG: 20 INJECTION, SOLUTION EPIDURAL; INFILTRATION; INTRACAUDAL; PERINEURAL at 14:24

## 2021-02-26 RX ADMIN — PANTOPRAZOLE SODIUM 40 MG: 40 TABLET, DELAYED RELEASE ORAL at 20:55

## 2021-02-26 RX ADMIN — ATORVASTATIN CALCIUM 40 MG: 40 TABLET, FILM COATED ORAL at 20:55

## 2021-02-26 RX ADMIN — CEFAZOLIN SODIUM 2 G: 10 INJECTION, POWDER, FOR SOLUTION INTRAVENOUS at 14:20

## 2021-02-26 RX ADMIN — MIDAZOLAM 2 MG: 1 INJECTION INTRAMUSCULAR; INTRAVENOUS at 13:44

## 2021-02-26 RX ADMIN — SODIUM CHLORIDE: 4.5 INJECTION, SOLUTION INTRAVENOUS at 17:35

## 2021-02-26 RX ADMIN — HYDROMORPHONE HYDROCHLORIDE 0.5 MG: 1 INJECTION, SOLUTION INTRAMUSCULAR; INTRAVENOUS; SUBCUTANEOUS at 15:28

## 2021-02-26 ASSESSMENT — PAIN DESCRIPTION - FREQUENCY
FREQUENCY: CONTINUOUS

## 2021-02-26 ASSESSMENT — PAIN DESCRIPTION - LOCATION
LOCATION: KNEE

## 2021-02-26 ASSESSMENT — PULMONARY FUNCTION TESTS
PIF_VALUE: 14
PIF_VALUE: 12
PIF_VALUE: 5
PIF_VALUE: 12
PIF_VALUE: 16
PIF_VALUE: 1
PIF_VALUE: 3
PIF_VALUE: 14
PIF_VALUE: 1
PIF_VALUE: 0
PIF_VALUE: 12
PIF_VALUE: 1
PIF_VALUE: 14
PIF_VALUE: 12
PIF_VALUE: 15
PIF_VALUE: 12
PIF_VALUE: 12

## 2021-02-26 ASSESSMENT — PAIN DESCRIPTION - DESCRIPTORS
DESCRIPTORS: ACHING
DESCRIPTORS: ACHING
DESCRIPTORS: BURNING
DESCRIPTORS: THROBBING
DESCRIPTORS: ACHING
DESCRIPTORS: BURNING
DESCRIPTORS: THROBBING

## 2021-02-26 ASSESSMENT — PAIN SCALES - GENERAL
PAINLEVEL_OUTOF10: 8
PAINLEVEL_OUTOF10: 9
PAINLEVEL_OUTOF10: 7
PAINLEVEL_OUTOF10: 7
PAINLEVEL_OUTOF10: 3
PAINLEVEL_OUTOF10: 8
PAINLEVEL_OUTOF10: 8
PAINLEVEL_OUTOF10: 9
PAINLEVEL_OUTOF10: 9

## 2021-02-26 ASSESSMENT — PAIN DESCRIPTION - PAIN TYPE
TYPE: SURGICAL PAIN
TYPE: SURGICAL PAIN;ACUTE PAIN
TYPE: SURGICAL PAIN
TYPE: ACUTE PAIN;SURGICAL PAIN
TYPE: SURGICAL PAIN

## 2021-02-26 ASSESSMENT — PAIN DESCRIPTION - PROGRESSION
CLINICAL_PROGRESSION: NOT CHANGED
CLINICAL_PROGRESSION: GRADUALLY IMPROVING
CLINICAL_PROGRESSION: GRADUALLY IMPROVING
CLINICAL_PROGRESSION: NOT CHANGED

## 2021-02-26 ASSESSMENT — PAIN - FUNCTIONAL ASSESSMENT
PAIN_FUNCTIONAL_ASSESSMENT: PREVENTS OR INTERFERES SOME ACTIVE ACTIVITIES AND ADLS
PAIN_FUNCTIONAL_ASSESSMENT: ACTIVITIES ARE NOT PREVENTED
PAIN_FUNCTIONAL_ASSESSMENT: PREVENTS OR INTERFERES WITH MANY ACTIVE NOT PASSIVE ACTIVITIES
PAIN_FUNCTIONAL_ASSESSMENT: PREVENTS OR INTERFERES SOME ACTIVE ACTIVITIES AND ADLS

## 2021-02-26 ASSESSMENT — PAIN DESCRIPTION - ORIENTATION
ORIENTATION: RIGHT

## 2021-02-26 ASSESSMENT — ENCOUNTER SYMPTOMS: SHORTNESS OF BREATH: 0

## 2021-02-26 ASSESSMENT — PAIN DESCRIPTION - ONSET
ONSET: ON-GOING
ONSET: ON-GOING
ONSET: AWAKENED FROM SLEEP
ONSET: ON-GOING

## 2021-02-26 NOTE — CARE COORDINATION
[facility] Follow Up Call    2021    Patient: Milla Zarco  Patient : 1970   MRN: [id number]  Reason for Admission: Right total knee arthroplasty prosthesis infection  Discharge Date: 21 RARS: Readmission Risk Score: 23      Care Transitions Subsequent and Final Call    Subsequent and Final Calls  Care Transitions Interventions  Other Interventions:         Needs to be reviewed by the provider   Additional needs identified to be addressed with provider No  none  Discussed COVID-19 related testing which was available at this time. Test results were negative. Patient informed of results, if available? Yes         Method of communication with provider : none    Care Transition Nurse (CTN) contacted the patient by telephone to follow up after admission on 2021 Verified name and  with patient as identifiers. Addressed changes since last contact: symptom management-bloody drainage, pain  Discharged needs reviewed: none  Follow up appointment completed? Yes      Patients top risk factors for readmission: medical condition  Interventions to address risk factors: Obtained and reviewed discharge summary and/or continuity of care documents      Plan for follow-up call in 3-5 days based on severity of symptoms and risk factors. Plan for next call: symptom management, bloody drainage, pain  CTN provided contact information for future needs. Writer spoke with patient, Teodora Dey. He stated things went OK last night, but woke up this morning to his pajama leg being soaked with blood. He stated knee swollen, pain is a 9 but can take pain medication in about 30 minutes. He stated the best his pain gets is a 7-8. He stated [facility]. nurse and therapy was there already today and [facility]. nurse was going to contact ortho MD about patient's drainage. Teodora Dey stated when RN took his temp this morning it was 99.   Teodora Dey stated he would also be calling ortho MD.  Patient stated he needed to get off the phone he had a VV with his pain MD that he needed to get on. Writer offered support to patient.         Follow Up  Future Appointments   Date Time Provider Timothy Perezi   3/2/2021  8:00 AM Iliana Rc, PT [facility] PT [facility]   3/4/2021  8:00 AM Iliana Rc, PT [facility] QC PT [facility]   3/5/2021 10:15 AM Nova Martinez MD W ORTHO [facility]   3/9/2021  3:30 PM Iliana Rc, PT [facility] QC PT [facility] HOD   3/11/2021  3:30 PM Iliana Rc, PT [facility] QC PT [facility]   3/16/2021  3:30 PM Iliana Rc, PT [facility] QC PT [facility]   3/18/2021  8:00 AM Liiana Rc, PT [facility] QC PT [facility] HOD   3/19/2021 12:20 PM Jacqui Diaz, APRN - CNP FF SLEEP MED [facility]   3/23/2021  8:00 AM Iliana Rc, PT [facility] QC PT [facility]   3/25/2021  8:00 AM Iliana Rc, PT [facility] QC PT [facility] HOD   3/30/2021  8:00 AM Iliana Rc, PT [facility] QC PT [facility]   4/1/2021  8:00 AM Iliana Rc, PT [facility] QC PT [facility] HOD   4/6/2021  8:00 AM Iliana Rc, PT [facility] QC PT [facility]   4/8/2021  8:00 AM [address] [facility] JAVY Urias RN

## 2021-02-26 NOTE — PROGRESS NOTES
Arrives to pacu, monitors placed, respirations easy, quickly restless to touch, opa removed, speech soft, responds appropriately, appears to rest/sleep when undisturbed

## 2021-02-26 NOTE — RESULT ENCOUNTER NOTE
Patient's positive result has been appropriately evaluated by the provider pool. Patient was contacted, stated he saw his orthopedist yesterday and the MD was already aware of the elevated CRP.

## 2021-02-26 NOTE — OP NOTE
Patient: Gilford Deems  YOB: 1970  MRN: [id number]    DATE OF PROCEDURE: 2/26/2021      PREOPERATIVE DIAGNOSIS:   Postoperative hematoma of right knee status post revision total knee arthroplasty     POSTOPERATIVE DIAGNOSIS:   Same     OPERATION PERFORMED:   Incision and drainage of right knee hematoma     SURGEON:  Maria Alejandra London MD     ASSISTANT:  JEMAL Lynn    EBL: 20 mL     IMPLANTS: None    SPECIMENS: Swab of subcutaneous tissue sent for culture     INDICATIONS:  The patient is a 53 y.o. male who underwent revision right total knee arthroplasty on February 16, 2021. He has had a draining wound on his right knee with a large subcutaneous hematoma. I recommended surgical incision and drainage. We went over the risks and complications of surgery including; bleeding, infection, decreased ROM, continued pain, instability, fracture, dislocation, neurovascular injury, post op cognitive disorder, DVT, pulmonary embolism and need for further surgical procedures. The patient understands these issues and signed informed consent for surgery. OPERATIVE PROCEDURE:  The patient was brought to the operating room. Once anesthetic was obtained and intravenous antibiotics delivered, the knee was prepped and draped in a sterile fashion. The pre-existing anterior midline incision over the knee was reopened. There was a large subcutaneous hematoma, most of which was clotted. The arthrotomy closure was explored. There is an approximately 1 cm opening in the most distal part of the arthrotomy closure. There was no active bleeding. The wound was thoroughly irrigated with sterile saline and Betadine. There was a small 1 cm longitudinal incision medial to the primary incision that had been the site of drainage. The wound edges of the small incision were sharply incised to healthy tissue. The small opening in the distal part of the arthrotomy was then closed with #1 Vicryl. Strata fix was used to reinforce the arthrotomy closure. A Vicryl suture was placed subcutaneously in the small wound that had previously been draining. Nylon was placed in the skin over this small medial wound. The anterior midline incision was then closed in layered fashion. A Prineo dressing was applied. The patient tolerated the procedure well. A dressing was applied, and she was brought to the recovery room in good condition.     Harriett Cruz MD  2/26/2021

## 2021-02-26 NOTE — PLAN OF CARE
Problem: Falls - Risk of:  Goal: Will remain free from falls  Description: Will remain free from falls  Outcome: Ongoing  Note: Fall risk assessment completed. Fall precautions in place. Bed in lowest position, wheels locked, bed/chair exit alarm in place, call light within reach, and non skid footwear on. Walkway free of clutter. Pt alert and oriented and able to make needs known. Pt educated to use call light when needing to get up, and pt utilizes call light to make needs known. Will continue to monitor. Problem: Falls - Risk of:  Goal: Absence of physical injury  Description: Absence of physical injury  Outcome: Ongoing  Note: Pt is free of injury. No injury noted. Fall precautions in place. Call light within reach. Will monitor. Problem: Skin Integrity:  Goal: Demonstration of wound healing without infection will improve  Description: Demonstration of wound healing without infection will improve  Outcome: Ongoing  Note: Pt is free of signs and symptoms of infection. Incision and dressing are clean, dry and intact. Vital signs stable. Will monitor.

## 2021-02-26 NOTE — H&P
Department of Orthopedic Surgery  Pre-operative History and Physical    DIAGNOSIS:  Right knee hematoma s/p revision R TKA    INDICATION:  Draining hematoma R knee    PROCEDURE:  I&D R knee    CHIEF COMPLAINT:  R knee drainage    History Obtained From:  patient    HISTORY OF PRESENT ILLNESS:      The patient is a 53 y.o. male with significant past medical history of revision R TKA on 2/16/2021 with draining wound and large hematoma. Past Medical History:        Diagnosis Date    Alcoholism [facility])     last drink 2015    Allergic migraine with status migrainosus     Allergic rhinitis, seasonal     Anemia     Arthritis     right knee    Vaughn's esophagus     Benign intracranial hypertension     Cancer (HCC)     renal     Carpal tunnel syndrome     Chronic pain     Depression     Depression     GERD (gastroesophageal reflux disease)     Headache(784.0)     History of blood transfusion     History of tobacco use     Quit 7/2014    Migraine     chronic for 1 year    Morbid obesity ([address]     Movement disorder     Onychomycosis     Sleep apnea, obstructive     Severe uses cpap stop bang 6    Unspecified cerebral artery occlusion with cerebral infarction 2014    slight weakness in left arm    Wears dentures     full set    Wears glasses      Past Surgical History:        Procedure Laterality Date    ABDOMEN SURGERY      gastric bypass    ABDOMEN SURGERY  4-7-2016    repair of recurrent incisional hernia with mesh, removal of old mesh, bilateral component separation    ABDOMINAL EXPLORATION SURGERY  1/11/16    exp lap, lysis of adhesions, small bowel resection    CARPAL TUNNEL RELEASE      bilat    DILATATION, ESOPHAGUS      ENDOSCOPY, COLON, DIAGNOSTIC      EYE SURGERY      GASTRIC BYPASS SURGERY  Jan 2009    Has lost about 200 pounds.     HERNIA REPAIR  3-    ventral    JOINT REPLACEMENT      KIDNEY REMOVAL Left 08/01/2018    KNEE ARTHROSCOPY Right 7/11/2013 Dr.Robert Sanders     KNEE ARTHROSCOPY Right 7/11/12    RIGHT KNEE ARTHROSCOPY WITH CHONDROPLASTY    KNEE SURGERY  July 2012    right, arthroscopy    KNEE SURGERY Right 2/18/2020    INCISION AND DRAINAGE RIGHT KNEE AND WOULD VAC PLACEMENT performed by Parker Rojas MD at [address]  2005    OTHER SURGICAL HISTORY Left 03/08/2016    CT biopsy ablasion left kidney    OTHER SURGICAL HISTORY  2016    small intestine 12 inch removed, 2 hernia surgeries, another surgery to drain infection from incision.  REVISION TOTAL KNEE ARTHROPLASTY Right 12/17/2019    RIGHT REVISION TIBIA TOTAL KNEE REPLACEMENT performed by Parker Rojas MD at [address] Right 1/22/2020    RIGHT KNEE IRRIGATION AND DEBRIDEMENT WITH POLY EXCHANGE performed by Katy Hermosillo MD at [facility] Right 2/16/2021    RIGHT REMOVAL OF EXPLANT AND TOTAL KNEE REPLACEMENT [facility]    RIGHT    TOTAL KNEE ARTHROPLASTY Right 8/12/2020    RIGHT ARTHROPLASY  RESECTION WITH INSERTION OF SPACER performed by Parker Rojas MD at [place] [address]  6-7-2016    UPPER GASTROINTESTINAL ENDOSCOPY  04/02/2018     Medications Prior to Admission:   Medications Prior to Admission: HYDROcodone-acetaminophen (NORCO) 5-325 MG per tablet, Take 1-2 tablets by mouth every 4 hours as needed for Pain for up to 5 days. ondansetron (ZOFRAN-ODT) 4 MG disintegrating tablet, Take 4 mg by mouth every 8 hours as needed  zolpidem (AMBIEN) 10 MG tablet, Take 10 mg by mouth nightly as needed. doxycycline hyclate (VIBRAMYCIN) 100 MG capsule, Take 1 capsule by mouth 2 times daily  aspirin 81 MG EC tablet, Take 1 tablet by mouth daily for 14 days Take twice a day for 14 days after knee surgery then resume daily dosing. FLUoxetine (PROZAC) 20 MG capsule, TAKE 1 CAPSULE BY MOUTH DAILY  traZODone (DESYREL) 100 MG tablet, Take 2 tablets by mouth nightly  atorvastatin (LIPITOR) 40 MG tablet, Take 1 tablet by mouth nightly At bedtime  pantoprazole (PROTONIX) 40 MG tablet, Take 1 tablet by mouth 2 times daily  calcium-vitamin D (OSCAL 500/200 D-3) 500-200 MG-UNIT per tablet, Take 1 tablet by mouth 2 times daily   Multiple Vitamin TABS, Take 1 tablet by mouth daily   sildenafil (REVATIO) 20 MG tablet, Take 4 tablets by mouth as needed (30 min prior to intercourse)  dextrose 5 % SOLN 50 mL with ketamine 100 MG/ML SOLN 50 mg, Infuse intravenously continuous Indications: 350 mg once every 3 months  Allergies:  Nsaids, Morphine, Prochlorperazine, Valtrex [valacyclovir hcl], Morphine and related, and Tolmetin    REVIEW OF SYSTEMS:  CONSTITUTIONAL:  negative  HEENT:  negative  RESPIRATORY:  negative  CARDIOVASCULAR:  negative  GASTROINTESTINAL:  negative  MUSCULOSKELETAL: reports right knee pain and drainage  NEUROLOGICAL:  negative    PHYSICAL EXAM:  VITALS:  BP (!) 149/70   Pulse 52   Temp 97.9 °F (36.6 °C) (Temporal)   Resp 16   Ht 6' (1.829 m)   Wt 199 lb (90.3 kg)   SpO2 98%   BMI 26.99 kg/m²     Appearance: sitting in exam room chair, appears to be in no acute distress, awake and alert  Resp: unlabored breathing on room air  Skin: warm, dry and intact with out erythema or significant increased temperature  Neuro: grossly intact both lower extremities. Intact sensation to light touch. Motor exam 4+ to 5/5 in all major motor groups. Right knee: Incision is healing as expected with no erythema. He has a large prepatellar hematoma. There is a 1 cm incision medial to the midline incision that is covered with Prineo that has some drainage out of it. Sensation is intact light touch. There is brisk capillary refill. Dorsalis pedis and posterior tibial pulses are intact. There is 5/5 muscle strength in all muscle groups. ASSESSMENT AND PLAN:    1. Patient is a 53 y.o. male with above specified procedure planned R knee I&D with anesthesia    2. Procedure options, risks and benefits reviewed with patient. Patient expresses understanding.     Leti Ch MD  2/26/2021

## 2021-02-26 NOTE — TELEPHONE ENCOUNTER
Auth: NPR  Date: 2/26/2021   Reference # [id number]  Spoke with: Online  Type of SX: Outpatient  Location: [facility]  CPT 48682   SX area: Rt knee  Insurance: [employer]

## 2021-02-26 NOTE — LETTER
[address] [facility]  Surgery Scheduling Form:  [facility]    DEMOGRAPHICS:                                                                                                                  Patient Name:  Magdaleno Dior  Patient :  1970   Patient SS#:      Patient Phone:  [phone] (home)                               Patient Address:  [address] [place] [address]    PCP:  Jose Kaur MD  Insurance:    Payor/Plan Subscr  Sex Relation Sub. Ins. ID Effective Group Num   1.  [id number] -* YOHANNES SALINAS 3/14/1974 Female Spouse [id number]                                    [id number]       DIAGNOSIS & PROCEDURE:                                                                                              Diagnosis:   Right knee wound dehiscence     T81.3  Operation:  Right knee Incision and drainage   [address]  Location:  [facility]  Surgeon:  Anna Rosen MD    SCHEDULING INFORMATION:                                                                                         .  Surgeon's Scheduling Instruction:  Today    Requested Date:    OR Time:   Patient Arrival Time:      OR Time Required:  45  Minutes  Anesthesia:  General  Equipment:    Mini C-Arm:     Standard C-Arm:    Status:  Outpatient             COVID:  RAPID  PAT Required:  No  Comments:   ALLERGIES:Nsaids, Morphine, Prochlorperazine, Valtrex [valacyclovir hcl], Morphine and related, and Tolmetin                        Chitra Palomino MD      21 11:46 AM  BILLING INFORMATION:                                                                                                       Procedure:       CPT Code Modifier    With Jerome Minaya [facility]  Pre Operative Physician Prophylaxis Orders - SCIP Protocols      Patient: Magdaleno Dior  :    1970        Procedure:  Right knee I & D      Pre-Operative Antibiotic Order:         []  ----  No Antibiotic Ordered [x]  ----  Give the following Antibiotic within 1 hour prior to start time:         Ancef 1 gram IV if patient is less than 200 pounds    or       Ancef 2 grams IV if patient is greater than 200 pounds    or     All patients will receive preop Cefazolin 2 GM or wt based              SURG        2-26                  COVID:   RAPID      Physician Signature:     Date:   Time:    [unfilled]  2/26/21

## 2021-02-26 NOTE — PROGRESS NOTES
 HYDROcodone-acetaminophen (NORCO) 5-325 MG per tablet Take 1-2 tablets by mouth every 4 hours as needed for Pain for up to 5 days. 60 tablet 0    ondansetron (ZOFRAN-ODT) 4 MG disintegrating tablet Take 4 mg by mouth every 8 hours as needed      zolpidem (AMBIEN) 10 MG tablet Take 10 mg by mouth nightly as needed.  doxycycline hyclate (VIBRAMYCIN) 100 MG capsule Take 1 capsule by mouth 2 times daily 60 capsule 1    aspirin 81 MG EC tablet Take 1 tablet by mouth daily for 14 days Take twice a day for 14 days after knee surgery then resume daily dosing. 28 tablet 0    sildenafil (REVATIO) 20 MG tablet Take 4 tablets by mouth as needed (30 min prior to intercourse) 30 tablet 0    FLUoxetine (PROZAC) 20 MG capsule TAKE 1 CAPSULE BY MOUTH DAILY 90 capsule 1    traZODone (DESYREL) 100 MG tablet Take 2 tablets by mouth nightly 180 tablet 0    atorvastatin (LIPITOR) 40 MG tablet Take 1 tablet by mouth nightly At bedtime 90 tablet 0    dextrose 5 % SOLN 50 mL with ketamine 100 MG/ML SOLN 50 mg Infuse intravenously continuous Indications: 350 mg once every 3 months      pantoprazole (PROTONIX) 40 MG tablet Take 1 tablet by mouth 2 times daily      calcium-vitamin D (OSCAL 500/200 D-3) 500-200 MG-UNIT per tablet Take 1 tablet by mouth 2 times daily       Multiple Vitamin TABS Take 1 tablet by mouth daily          Exam:  Temperature 98.1 °F (36.7 °C), temperature source Temporal.    Appearance: sitting in exam room chair, appears to be in no acute distress, awake and alert  Resp: unlabored breathing on room air  Skin: warm, dry and intact with out erythema or significant increased temperature  Neuro: grossly intact both lower extremities. Intact sensation to light touch. Motor exam 4+ to 5/5 in all major motor groups. Right knee: Incision is healing as expected with no erythema. He has a large prepatellar hematoma. There is a 1 cm incision medial to the midline incision that continues to drain. Sensation is intact light touch. There is brisk capillary refill. Dorsalis pedis and posterior tibial pulses are intact. There is 5/5 muscle strength in all muscle groups. Imaging:  None performed today    Assessment:  S/p revision right total knee arthroplasty    Plan:  He has a large prepatellar hematoma that continues to drain. He underwent aspiration and closure of this draining wound yesterday but continues to drain. I recommended return to the operating room for incision and drainage of his hematoma with possible polyethylene exchange if that extends into the joint capsule. We went over the risks and complications of surgery including; bleeding, infection, decreased ROM, continued pain, instability, fracture, dislocation, neurovascular injury, post op cognitive disorder, leg length discrepancy, DVT, pulmonary embolism and need for further surgical procedures. The patient understands these issues and we will see the patient in the operating room. This dictation was done with Dragon dictation and may contain mechanical errors related to translation.

## 2021-02-26 NOTE — ANESTHESIA PRE PROCEDURE
[facility] Department of Anesthesiology  Pre-Anesthesia Evaluation/Consultation       Name:  Randall Shipman  : 1970  Age:  53 y.o. MRN:  [id number]  Date: 2021           Surgeon: Surgeon(s):  Lb Mondragon MD    Procedure: Procedure(s):  INCISION AND DRAINAGE RIGHT KNEE     Allergies   Allergen Reactions    Nsaids Nausea Only and Other (See Comments)     Hx of Barretts esophagus      Morphine Hives and Itching     Pt gets Hives/itching. Does not tolerate     Prochlorperazine Other (See Comments)     No allergic reaction, patient reported sense of \"restlessness\" and fidgiting    Valtrex [Valacyclovir Hcl] Diarrhea    Morphine And Related Hives and Itching    Tolmetin Nausea Only     Hx of Barretts esophagus     Patient Active Problem List   Diagnosis    Insomnia-chronic intermittent--uses prn ambien--to be filled by sleep    Vaughn esophagus-on daily ppi-last egd 10/20 Dr eMl Cochran    History of tobacco useadvised to quit- quit 2014    Allergic rhinitis, seasonal    Obstructive sleep apnea,Severe -(on cpap)    Class 1 obesity due to excess calories with body mass index (BMI) of 34.0 to 34.9 in adult    Depression-on daily effexor xr--sees dr Raul England    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  Chondromalacia of patellofemoral joint    Chronic pain syndrome    History of stroke    Gastroesophageal reflux disease with esophagitis--on daily ppi    Intractable chronic migraine without aura and with status migrainosus    Iron deficiency anemia    B12 deficiency    Malignant neoplasm of left kidney (HCC) clear cell. 18 Dr Porter Leiva.     Failed total knee, right, initial encounter ([address]    Infection of total knee replacement ([address]    History of depression    History of alcoholism ([address]    Infection of prosthetic right knee joint ([address]  History of total knee arthroplasty, right     Past Medical History:   Diagnosis Date    Alcoholism ([address]     last drink 2015    Allergic migraine with status migrainosus     Allergic rhinitis, seasonal     Anemia     Arthritis     right knee    Vaughn's esophagus     Benign intracranial hypertension     Cancer (HCC)     renal     Carpal tunnel syndrome     Chronic pain     Depression     Depression     GERD (gastroesophageal reflux disease)     Headache(784.0)     History of blood transfusion     History of tobacco use     Quit 7/2014    Migraine     chronic for 1 year    Morbid obesity ([address]     Movement disorder     Onychomycosis     Sleep apnea, obstructive     Severe uses cpap stop bang 6    Unspecified cerebral artery occlusion with cerebral infarction 2014    slight weakness in left arm    Wears dentures     full set    Wears glasses      Past Surgical History:   Procedure Laterality Date    ABDOMEN SURGERY      gastric bypass    ABDOMEN SURGERY  4-7-2016    repair of recurrent incisional hernia with mesh, removal of old mesh, bilateral component separation    ABDOMINAL EXPLORATION SURGERY  1/11/16    exp lap, lysis of adhesions, small bowel resection    CARPAL TUNNEL RELEASE      bilat    DILATATION, ESOPHAGUS      ENDOSCOPY, COLON, DIAGNOSTIC      EYE SURGERY      GASTRIC BYPASS SURGERY  Jan 2009    Has lost about 200 pounds.     HERNIA REPAIR  3-    ventral    JOINT REPLACEMENT      KIDNEY REMOVAL Left 08/01/2018    KNEE ARTHROSCOPY Right 7/11/2013    Dr.Robert Sanders     KNEE ARTHROSCOPY Right 7/11/12    RIGHT KNEE ARTHROSCOPY WITH CHONDROPLASTY    KNEE SURGERY  July 2012    right, arthroscopy    KNEE SURGERY Right 2/18/2020    INCISION AND DRAINAGE RIGHT KNEE AND WOULD VAC PLACEMENT performed by Monika Long MD at [address]  2005    OTHER SURGICAL HISTORY Left 03/08/2016    CT biopsy ablasion left kidney  aspirin 81 MG EC tablet Take 1 tablet by mouth daily for 14 days Take twice a day for 14 days after knee surgery then resume daily dosing. 28 tablet 0    sildenafil (REVATIO) 20 MG tablet Take 4 tablets by mouth as needed (30 min prior to intercourse) 30 tablet 0    FLUoxetine (PROZAC) 20 MG capsule TAKE 1 CAPSULE BY MOUTH DAILY 90 capsule 1    traZODone (DESYREL) 100 MG tablet Take 2 tablets by mouth nightly 180 tablet 0    atorvastatin (LIPITOR) 40 MG tablet Take 1 tablet by mouth nightly At bedtime 90 tablet 0    dextrose 5 % SOLN 50 mL with ketamine 100 MG/ML SOLN 50 mg Infuse intravenously continuous Indications: 350 mg once every 3 months      pantoprazole (PROTONIX) 40 MG tablet Take 1 tablet by mouth 2 times daily      calcium-vitamin D (OSCAL 500/200 D-3) 500-200 MG-UNIT per tablet Take 1 tablet by mouth 2 times daily       Multiple Vitamin TABS Take 1 tablet by mouth daily        No current facility-administered medications for this encounter. Vital Signs (Current)   There were no vitals filed for this visit. BP Readings from Last 3 Encounters:   21 120/77   21 127/72   21 131/80     Vital Signs Statistics (for past 48 hrs)     Temp  Av.1 °F (36.7 °C)  Min: 98.1 °F (36.7 °C)   Min taken time: 21 1143  Max: 98.1 °F (36.7 °C)   Max taken time: 21 1143  BP Readings from Last 3 Encounters:   21 120/77   21 127/72   21 131/80       BMI  There is no height or weight on file to calculate BMI. Estimated body mass index is 27.11 kg/m² as calculated from the following:    Height as of 21: 6' (1.829 m). Weight as of 21: 199 lb 14.4 oz (90.7 kg).     CBC   Lab Results   Component Value Date    WBC 10.1 2021    RBC 3.17 2021    RBC 4.94 2015    HGB 9.1 2021    HCT 28.0 2021    MCV 88.5 2021    RDW 17.7 2021     2021     CMP    Lab Results Component Value Date     02/24/2021    K 3.8 02/24/2021     02/24/2021    CO2 26 02/24/2021    BUN 14 02/24/2021    CREATININE 1.1 02/24/2021    GFRAA >60 02/24/2021    AGRATIO 1.4 02/24/2021    LABGLOM >60 02/24/2021    GLUCOSE 104 02/24/2021    GLUCOSE 84 01/07/2015    PROT 6.3 02/24/2021    PROT 6.7 01/07/2015    CALCIUM 9.0 02/24/2021    BILITOT 0.8 02/24/2021    ALKPHOS 109 02/24/2021    AST 13 02/24/2021    ALT <5 02/24/2021     BMP    Lab Results   Component Value Date     02/24/2021    K 3.8 02/24/2021     02/24/2021    CO2 26 02/24/2021    BUN 14 02/24/2021    CREATININE 1.1 02/24/2021    CALCIUM 9.0 02/24/2021    GFRAA >60 02/24/2021    LABGLOM >60 02/24/2021    GLUCOSE 104 02/24/2021    GLUCOSE 84 01/07/2015     POCGlucose  Recent Labs     02/24/21  1940   GLUCOSE 104*      Coags    Lab Results   Component Value Date    PROTIME 13.4 02/09/2021    INR 1.15 02/09/2021    APTT 37.6 90/01/5771     HCG (If Applicable) No results found for: PREGTESTUR, PREGSERUM, HCG, HCGQUANT   ABGs No results found for: PHART, PO2ART, UBP1XOK, CIE6RXP, BEART, T1XRMCDA   Type & Screen (If Applicable)  No results found for: LABABO, LABRH                         BMI: Wt Readings from Last 3 Encounters:       NPO Status:                          Anesthesia Evaluation  Patient summary reviewed  Airway: Mallampati: III  TM distance: >3 FB   Neck ROM: full  Mouth opening: > = 3 FB Dental:    (+) edentulous      Pulmonary:   (+) sleep apnea: on CPAP,      (-) COPD, asthma and shortness of breath                           Cardiovascular:        (-) hypertension, valvular problems/murmurs, past MI, CAD, CABG/stent, dysrhythmias and  angina                Neuro/Psych:   (+) CVA:, neuromuscular disease:, headaches: migraine headaches, psychiatric history:depression/anxiety    (-) seizures and TIA           GI/Hepatic/Renal:   (+) GERD:,      (-) PUD, liver disease and no renal disease       Endo/Other: (+) blood dyscrasia: anemia, arthritis:., .    (-) diabetes mellitus               Abdominal:           Vascular: negative vascular ROS. Anesthesia Plan      general     ASA 3     (I discussed with the patient the risks and benefits of PIV, general anesthesia, IV Narcotics, PACU. All questions were answered the patient agrees with the plan.)  Induction: intravenous. Anesthetic plan and risks discussed with patient. Plan discussed with CRNA. This pre-anesthesia assessment may be used as a history and physical.    DOS STAFF ADDENDUM:    Pt seen and examined, chart reviewed (including anesthesia, drug and allergy history). No interval changes to history and physical examination. Anesthetic plan, risks, benefits, alternatives, and personnel involved discussed with patient. Patient verbalized an understanding and agrees to proceed.       Saumya Persaud MD  February 26, 2021  1:01 PM

## 2021-02-26 NOTE — ANESTHESIA POSTPROCEDURE EVALUATION
Department of Anesthesiology  Postprocedure Note    Patient: Gilford Deems  MRN: [id number]  YOB: 1970  Date of evaluation: 2/26/2021  Time:  4:42 PM     Procedure Summary     Date: 02/26/21 Room / Location:  [facility] [address] [place] [address]    Anesthesia Start: 4200 Anesthesia Stop: 4677    Procedure: INCISION AND DRAINAGE OF RIGHT KNEE HEMATOMA (Right Knee) Diagnosis: (RIGHT KNEE HEMATOMA)    Surgeons: Maria Alejandra London MD Responsible Provider: Axel De Dios MD    Anesthesia Type: general ASA Status: 3          Anesthesia Type: general    Cande Phase I: Cande Score: 8    Cande Phase II:      Last vitals: Reviewed and per EMR flowsheets.        Anesthesia Post Evaluation    Patient location during evaluation: bedside  Patient participation: complete - patient participated  Level of consciousness: awake  Pain score: 2  Airway patency: patent  Nausea & Vomiting: no nausea and no vomiting  Complications: no  Cardiovascular status: blood pressure returned to baseline  Respiratory status: acceptable  Hydration status: euvolemic

## 2021-02-27 VITALS
OXYGEN SATURATION: 95 % | TEMPERATURE: 97.8 F | BODY MASS INDEX: 27.17 KG/M2 | RESPIRATION RATE: 10 BRPM | DIASTOLIC BLOOD PRESSURE: 59 MMHG | HEIGHT: 72 IN | HEART RATE: 51 BPM | WEIGHT: 200.62 LBS | SYSTOLIC BLOOD PRESSURE: 105 MMHG

## 2021-02-27 LAB
GLUCOSE BLD-MCNC: 135 MG/DL (ref 70–99)
PERFORMED ON: ABNORMAL

## 2021-02-27 PROCEDURE — 2580000003 HC RX 258: Performed by: ORTHOPAEDIC SURGERY

## 2021-02-27 PROCEDURE — 6360000002 HC RX W HCPCS: Performed by: NURSE PRACTITIONER

## 2021-02-27 PROCEDURE — 6370000000 HC RX 637 (ALT 250 FOR IP): Performed by: ORTHOPAEDIC SURGERY

## 2021-02-27 PROCEDURE — 6360000002 HC RX W HCPCS: Performed by: ORTHOPAEDIC SURGERY

## 2021-02-27 PROCEDURE — 96374 THER/PROPH/DIAG INJ IV PUSH: CPT

## 2021-02-27 PROCEDURE — 96375 TX/PRO/DX INJ NEW DRUG ADDON: CPT

## 2021-02-27 RX ADMIN — OXYCODONE HYDROCHLORIDE 10 MG: 10 TABLET ORAL at 10:02

## 2021-02-27 RX ADMIN — FLUOXETINE 20 MG: 20 CAPSULE ORAL at 08:25

## 2021-02-27 RX ADMIN — HYDROMORPHONE HYDROCHLORIDE 0.5 MG: 1 INJECTION, SOLUTION INTRAMUSCULAR; INTRAVENOUS; SUBCUTANEOUS at 02:46

## 2021-02-27 RX ADMIN — SODIUM CHLORIDE: 4.5 INJECTION, SOLUTION INTRAVENOUS at 02:46

## 2021-02-27 RX ADMIN — PANTOPRAZOLE SODIUM 40 MG: 40 TABLET, DELAYED RELEASE ORAL at 08:25

## 2021-02-27 RX ADMIN — ACETAMINOPHEN 650 MG: 325 TABLET ORAL at 05:04

## 2021-02-27 RX ADMIN — ASPIRIN 81 MG: 81 TABLET, FILM COATED ORAL at 05:04

## 2021-02-27 RX ADMIN — HYDROMORPHONE HYDROCHLORIDE 0.5 MG: 1 INJECTION, SOLUTION INTRAMUSCULAR; INTRAVENOUS; SUBCUTANEOUS at 06:48

## 2021-02-27 RX ADMIN — OXYCODONE HYDROCHLORIDE 10 MG: 10 TABLET ORAL at 05:04

## 2021-02-27 RX ADMIN — CEFAZOLIN SODIUM 2000 MG: 10 INJECTION, POWDER, FOR SOLUTION INTRAVENOUS at 05:04

## 2021-02-27 RX ADMIN — DOCUSATE SODIUM 50 MG AND SENNOSIDES 8.6 MG 1 TABLET: 8.6; 5 TABLET, FILM COATED ORAL at 08:25

## 2021-02-27 ASSESSMENT — PAIN DESCRIPTION - DESCRIPTORS
DESCRIPTORS: ACHING
DESCRIPTORS: ACHING

## 2021-02-27 ASSESSMENT — PAIN DESCRIPTION - FREQUENCY
FREQUENCY: CONTINUOUS
FREQUENCY: CONTINUOUS

## 2021-02-27 ASSESSMENT — PAIN DESCRIPTION - PAIN TYPE
TYPE: SURGICAL PAIN
TYPE: SURGICAL PAIN

## 2021-02-27 ASSESSMENT — PAIN DESCRIPTION - ONSET
ONSET: ON-GOING
ONSET: ON-GOING

## 2021-02-27 ASSESSMENT — PAIN SCALES - WONG BAKER
WONGBAKER_NUMERICALRESPONSE: 0
WONGBAKER_NUMERICALRESPONSE: 0

## 2021-02-27 ASSESSMENT — PAIN DESCRIPTION - ORIENTATION
ORIENTATION: RIGHT
ORIENTATION: RIGHT

## 2021-02-27 ASSESSMENT — PAIN SCALES - GENERAL
PAINLEVEL_OUTOF10: 8
PAINLEVEL_OUTOF10: 9

## 2021-02-27 ASSESSMENT — PAIN - FUNCTIONAL ASSESSMENT: PAIN_FUNCTIONAL_ASSESSMENT: PREVENTS OR INTERFERES SOME ACTIVE ACTIVITIES AND ADLS

## 2021-02-27 ASSESSMENT — PAIN DESCRIPTION - LOCATION: LOCATION: KNEE

## 2021-02-27 ASSESSMENT — PAIN DESCRIPTION - PROGRESSION
CLINICAL_PROGRESSION: NOT CHANGED
CLINICAL_PROGRESSION: NOT CHANGED

## 2021-02-27 NOTE — PLAN OF CARE
Problem: Falls - Risk of:  Goal: Will remain free from falls  Description: Will remain free from falls  2/27/2021 1035 by Will Rooney RN  Outcome: Ongoing  Note: Fall risk assessment completed. Fall precautions in place. Call light within reach. Pt educated on calling for assistance before getting up. Walkway free of clutter. Will continue to monitor. Electronically signed by Will Rooney RN on 2/27/2021 at 10:35 AM    2/26/2021 2111 by Roby Logan RN  Outcome: Ongoing  Note: Patient educated on fall prevention. Call light is within reach, bed locked in lowest position, personal items within reach, and bed alarm is on. Will round on patient per unit guidelines. Goal: Absence of physical injury  Description: Absence of physical injury  2/27/2021 1035 by Will Rooney RN  Outcome: Ongoing  2/26/2021 2111 by Roby Logan RN  Outcome: Ongoing  Note: Pt assessed for fall risk and fall precautions put into place. Bed in lowest position and wheels locked, call light within reach. Nonskid footwear in place. Patient educated on appropriate method of transfer and to call for assistance.

## 2021-02-27 NOTE — FLOWSHEET NOTE
02/27/21 0936   Readmission Assessment   Number of Days since last admission? 1-7 days   Previous Disposition Home with Home Health   Who is being Interviewed Patient   What was the patient's/caregiver's perception as to why they think they needed to return back to the hospital? Other (Comment)  (Right knee hematoma s/p revision R TKA)   Did you visit your Primary Care Physician after you left the hospital, before you returned this time? No   Why weren't you able to visit your PCP? Did not have an appointment   Did you see a specialist, such as Cardiac, Pulmonary, Orthopedic Physician, etc. after you left the hospital? Yes   Who advised the patient to return to the hospital? Physician   Does the patient report anything that got in the way of taking their medications? No   In our efforts to provide the best possible care to you and others like you, can you think of anything that we could have done to help you after you left the hospital the first time, so that you might not have needed to return so soon?  Other (Comment)  (Right knee hematoma s/p revision R TKA)

## 2021-02-27 NOTE — PLAN OF CARE
Problem: Falls - Risk of:  Goal: Will remain free from falls  Description: Will remain free from falls  2/26/2021 2111 by Alexandre Baxter RN  Outcome: Ongoing  Note: Patient educated on fall prevention. Call light is within reach, bed locked in lowest position, personal items within reach, and bed alarm is on. Will round on patient per unit guidelines. 2/26/2021 1728 by Anai Daugherty RN  Outcome: Ongoing  Note: Fall risk assessment completed. Fall precautions in place. Bed in lowest position, wheels locked, bed/chair exit alarm in place, call light within reach, and non skid footwear on. Walkway free of clutter. Pt alert and oriented and able to make needs known. Pt educated to use call light when needing to get up, and pt utilizes call light to make needs known. Will continue to monitor. Goal: Absence of physical injury  Description: Absence of physical injury  2/26/2021 2111 by Alexandre Baxter RN  Outcome: Ongoing  Note: Pt assessed for fall risk and fall precautions put into place. Bed in lowest position and wheels locked, call light within reach. Nonskid footwear in place. Patient educated on appropriate method of transfer and to call for assistance. 2/26/2021 1728 by Anai Daugherty RN  Outcome: Ongoing  Note: Pt is free of injury. No injury noted. Fall precautions in place. Call light within reach. Will monitor. Problem: Skin Integrity:  Goal: Demonstration of wound healing without infection will improve  Description: Demonstration of wound healing without infection will improve  2/26/2021 2111 by Alexandre Baxter RN  Outcome: Ongoing  Note: Will monitor skin and mucous members. Will turn patient every 2 hours, monitor for friction and sheering, and change dressings as needed. Will preform skin assessment every shift.       2/26/2021 1728 by Anai Daugherty RN  Outcome: Ongoing Note: Pt is free of signs and symptoms of infection. Incision and dressing are clean, dry and intact. Vital signs stable. Will monitor. Goal: Complications related to intravenous access or infusion will be avoided or minimized  Description: Complications related to intravenous access or infusion will be avoided or minimized  2/26/2021 2111 by Tanisha Marquis RN  Outcome: Ongoing  Note: Will monitor skin and mucous members. Will turn patient every 2 hours, monitor for friction and sheering, and change dressings as needed. Will preform skin assessment every shift. 2/26/2021 1728 by Oxana Loyd RN  Outcome: Ongoing     Problem: Pain:  Goal: Pain level will decrease  Description: Pain level will decrease  Outcome: Ongoing  Note: Educated patient on pain management. Will assess patients pain level per unit protocol, and provide pain management measures as needed. Goal: Control of acute pain  Description: Control of acute pain  Outcome: Ongoing  Note: Patient educated on acute pain. Taught patient to use call light to ask for pain medication. PRN pain medication given for acute pain. Will continue to monitor pain per unit protocol. Goal: Control of chronic pain  Description: Control of chronic pain  Outcome: Ongoing  Note: Patient educated on chronic pain. Taught patient to use call light to ask for pain medication. Will continue to monitor pain per unit protocol.

## 2021-02-27 NOTE — PROGRESS NOTES
Data- discharge order received, pt verbalized agreement to discharge, disposition to previous residence, no needs for HHC/DME. Action- discharge instructions prepared and given to patient, pt verbalized understanding. Medication information packet given r/t NEW and/or CHANGED prescriptions emphasizing name/purpose/side effects, pt verbalized understanding. Discharge instruction summary: Diet- general, Activity- with walker, Primary Care Physician as follows: Cheryl Primrose, [phone] f/u appointment with Dr Angel Dillard March 5th, immunizations reviewed and and patient educated to review vaccines with pcp, prescription medications filled pt has no new scripts. Inpatient surgical procedure precautions reviewed: pt educated as to when to call Dr Angel Dillard. Response- Pt belongings gathered, IV removed. Disposition is home (no HHC/DME needs), transported with wheelchair, taken to lobby via w/c w/ PCA, no complications.    Electronically signed by Didier Rasmussen RN on 2/27/2021 at 10:43 AM

## 2021-02-27 NOTE — DISCHARGE INSTR - COC
Continuity of Care Form    Patient Name: Jf Self   :  1970  MRN:  [id number]    Admit date:  2021  Discharge date:  21    Code Status Order: Full Code   Advance Directives:   Advance Care Flowsheet Documentation     Date/Time Healthcare Directive Type of Healthcare Directive Copy in [address] Agent's Name Healthcare Agent's Phone Number    [phone]  Yes, patient has an advance directive for healthcare treatment  Living will;Durable power of  for health care  Yes, copy in chart  Spouse  Laurence Sharp  [phone]          Admitting Physician:  Pastora Alonso MD  PCP: Reji Griggs MD    Discharging Nurse: [facility] Unit/Room#: F7A-2725/9602-56  Discharging Unit Phone Number: [phone]    Emergency Contact:   Extended Emergency Contact Information  Primary Emergency Contact: Phuc Trujillo  Address: [address] Phone: [phone]  Mobile Phone: [phone]  Relation: Spouse  Secondary Emergency Contact: Leyda Klein  Phone: [phone]  Relation: Parent    Past Surgical History:  Past Surgical History:   Procedure Laterality Date    ABDOMEN SURGERY      gastric bypass    ABDOMEN SURGERY  2016    repair of recurrent incisional hernia with mesh, removal of old mesh, bilateral component separation    ABDOMINAL EXPLORATION SURGERY  16    exp lap, lysis of adhesions, small bowel resection    CARPAL TUNNEL RELEASE      bilat    DILATATION, ESOPHAGUS      ENDOSCOPY, COLON, DIAGNOSTIC      EYE SURGERY      GASTRIC BYPASS SURGERY  2009    Has lost about 200 pounds.     HERNIA REPAIR  3-    ventral    JOINT REPLACEMENT      KIDNEY REMOVAL Left 2018    KNEE ARTHROSCOPY Right 2013    Dr.Robert Sanders     KNEE ARTHROSCOPY Right 12    RIGHT KNEE ARTHROSCOPY WITH CHONDROPLASTY  KNEE SURGERY  July 2012    right, arthroscopy    KNEE SURGERY Right 2/18/2020    INCISION AND DRAINAGE RIGHT KNEE AND WOULD VAC PLACEMENT performed by Darlene Nunes MD at [address]  2005    OTHER SURGICAL HISTORY Left 03/08/2016    CT biopsy ablasion left kidney    OTHER SURGICAL HISTORY  2016    small intestine 12 inch removed, 2 hernia surgeries, another surgery to drain infection from incision.      REVISION TOTAL KNEE ARTHROPLASTY Right 12/17/2019    RIGHT REVISION TIBIA TOTAL KNEE REPLACEMENT performed by Darlene Nunes MD at [facility] Right 1/22/2020    RIGHT KNEE IRRIGATION AND DEBRIDEMENT WITH POLY EXCHANGE performed by Leona Mckeon MD at [facility] Right 2/16/2021    RIGHT REMOVAL OF EXPLANT AND TOTAL KNEE REPLACEMENT performed by Reema Aldana MD at [facility]  10/15/13    RIGHT    TOTAL KNEE ARTHROPLASTY Right 8/12/2020    RIGHT ARTHROPLASY  RESECTION WITH INSERTION OF SPACER performed by Darlene Nunes MD at [facility]  6-7-2016    UPPER GASTROINTESTINAL ENDOSCOPY  04/02/2018       Immunization History:   Immunization History   Administered Date(s) Administered    HIB PRP-T (ActHIB, Hiberix) 09/13/2010    Hib, unspecified 02/09/2015    Influenza Virus Vaccine 02/03/2018    Influenza Whole 10/04/2012    Influenza, Intradermal, Preservative free 10/28/2014    Influenza, Intradermal, Quadrivalent, Preservative Free 12/13/2016    Influenza, Quadv, IM, PF (6 mo and older Fluzone, Flulaval, Fluarix, and 3 yrs and older Afluria) 12/18/2019, 11/10/2020    Influenza, Sheryle Rude, Recombinant, IM PF (Flublok 18 yrs and older) 10/15/2018    Meningococcal ACWY Vaccine 01/15/2009    Meningococcal MCV4O (Menveo) 08/10/2019, 10/30/2019    Meningococcal MCV4P (Menactra) 02/09/2015, 08/17/2019, 10/30/2019 Last Vital Signs: BP (!) 105/59   Pulse 51   Temp 97.8 °F (36.6 °C) (Oral)   Resp 10   Ht 6' (1.829 m)   Wt 200 lb 9.9 oz (91 kg)   SpO2 95%   BMI 27.21 kg/m²     Last documented pain score (0-10 scale): Pain Level: 8  Last Weight:   Wt Readings from Last 1 Encounters:   02/27/21 200 lb 9.9 oz (91 kg)     Mental Status:  oriented and alert    IV Access:  - None    Nursing Mobility/ADLs:  Walking   Assisted-use walker  Transfer  Independent  Bathing  Independent  Dressing  Independent  [address]  Independent  Med Delivery   whole    Wound Care Documentation and Therapy:        Elimination:  Continence:   · Bowel: Yes  · Bladder: Yes  Urinary Catheter: None   Colostomy/Ileostomy/Ileal Conduit: No       Date of Last BM: 2/26/21    Intake/Output Summary (Last 24 hours) at 2/27/2021 1037  Last data filed at 2/27/2021 0326  Gross per 24 hour   Intake 1160 ml   Output 800 ml   Net 360 ml     I/O last 3 completed shifts: In: 1160 [P.O.:360; I.V.:800]  Out: 800 [Urine:800]    Safety Concerns: At Risk for Falls    Impairments/Disabilities:      None    Nutrition Therapy:  Current Nutrition Therapy:   - Oral Diet:  General    Routes of Feeding: Oral  Liquids: Thin Liquids  Daily Fluid Restriction: no  Last Modified Barium Swallow with Video (Video Swallowing Test): not done    Treatments at the Time of Hospital Discharge:   Respiratory Treatments:   Oxygen Therapy:  is not on home oxygen therapy.   Ventilator:    - No ventilator support    Rehab Therapies: Physical Therapy and Occupational Therapy  Weight Bearing Status/Restrictions: No weight bearing restirctions  Other Medical Equipment (for information only, NOT a DME order):  walker  Other Treatments:     Patient's personal belongings (please select all that are sent with patient):  None    RN SIGNATURE:  Electronically signed by Florencio Rollins RN on 2/27/21 at 10:39 AM EST    CASE MANAGEMENT/SOCIAL WORK SECTION Inpatient Status Date: ***    Readmission Risk Assessment Score:  Readmission Risk              Risk of Unplanned Readmission:        0           Discharging to Facility/ Agency   · Name:   · Address:  · Phone:  · Fax:    Dialysis Facility (if applicable)   · Name:  · Address:  · Dialysis Schedule:  · Phone:  · Fax:    / signature: {Esignature:[id number]}    PHYSICIAN SECTION    Prognosis: {Prognosis:7593935245}    Condition at Discharge: [address] [place] [address] Patient Condition:512281358}    Rehab Potential (if transferring to Rehab): {Prognosis:5907524660}    Recommended Labs or Other Treatments After Discharge: ***    Physician Certification: I certify the above information and transfer of Jf Self  is necessary for the continuing treatment of the diagnosis listed and that he requires {Admit to Appropriate Level of Care:69158} for {GREATER/LESS:557320458} 30 days.      Update Admission H&P: {CHP DME Changes in OLUTS:272238671}    PHYSICIAN SIGNATURE:  {Esignature:[id number]}

## 2021-02-27 NOTE — DISCHARGE SUMMARY
Discharge Summary     Date:2/27/2021        Patient Ilan Luna     Date of Birth:[birth year]     Age:50 y.o. Admit Date:2/26/2021   Admission Condition:good   Discharged Condition:good  Discharge Date: 02/27/21     Discharge Diagnoses   Active Problems:    Hematoma of right knee region  Resolved Problems:    * No resolved hospital problems. [facility] Stay   Narrative of Hospital Course: He presented to clinic on February 26, 2021 with persistent draining wound after right knee surgery. Went straight to the operating room for I&D of a hematoma of his right knee. He was admitted postoperatively for pain control and monitoring of wound. Pain was controlled with IV and p.o. pain medications. He was discharged home on postoperative day #1. Consultants:   None    Surgeries/Procedures Performed:  Procedure(s):  INCISION AND DRAINAGE OF RIGHT KNEE HEMATOMA        Discharge Plan   Disposition: Home    Provider Follow-Up:   Addis Early MD  [address] [place] [address]  [phone]    In 2 weeks        Patient Instructions   Diet: regular diet    Activity: activity as tolerated    Other Instructions:   Continue physical therapy as previously prescribed    Discharge Medications         Medication List      CONTINUE taking these medications    aspirin 81 MG EC tablet  Take 1 tablet by mouth daily for 14 days Take twice a day for 14 days after knee surgery then resume daily dosing. atorvastatin 40 MG tablet  Commonly known as: LIPITOR  Take 1 tablet by mouth nightly At bedtime     doxycycline hyclate 100 MG capsule  Commonly known as: VIBRAMYCIN  Take 1 capsule by mouth 2 times daily     FLUoxetine 20 MG capsule  Commonly known as: PROZAC  TAKE 1 CAPSULE BY MOUTH DAILY     HYDROcodone-acetaminophen 5-325 MG per tablet  Commonly known as: NORCO  Take 1-2 tablets by mouth every 4 hours as needed for Pain for up to 5 days.      Multiple Vitamin Tabs     ondansetron 4 MG

## 2021-02-27 NOTE — PROGRESS NOTES
[facility] ([facility]) Orthopaedic Surgery  Progress Note    Sukhwinder Zarate is a 53 y.o. who is POD#1 s/p I&D of right knee hematoma. He has no issues or concerns this morning. Pain is currently controlled. 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  Chondromalacia of patellofemoral joint    Chronic pain syndrome    History of stroke    Gastroesophageal reflux disease with esophagitis--on daily ppi    Intractable chronic migraine without aura and with status migrainosus    Iron deficiency anemia    B12 deficiency    Malignant neoplasm of left kidney (HCC) clear cell. 8/1/18 Dr Thomas Begun.  Failed total knee, right, initial encounter ([address]    Infection of total knee replacement ([address]    History of depression    History of alcoholism ([address]    Infection of prosthetic right knee joint ([address]    History of total knee arthroplasty, right    Hematoma of right knee region       Exam:  Blood pressure (!) 105/59, pulse 51, temperature 97.8 °F (36.6 °C), temperature source Oral, resp. rate 10, height 6' (1.829 m), weight 200 lb 9.9 oz (91 kg), SpO2 95 %. Appearance: lying in hospital bed, appears to be in no acute distress, awake and alert  Resp: unlabored breathing on room air  Skin: warm, dry and intact with out erythema or significant increased temperature  Neuro: grossly intact both lower extremities. Intact sensation to light touch. Motor exam 4+ to 5/5 in all major motor groups. Right knee: ACE wrap in place -clean and dry. Motor and sensation intact distally.     Labs:  None    Assessment:  Revision right total knee arthroplasty with a hematoma status post I&D of hematoma    Plan:  -Activity as tolerated  -WBAT RLE  -Pain control  -Discharge home today    Carlin Huber MD  2/27/2021

## 2021-02-27 NOTE — PROGRESS NOTES
Patient A&O. No complaints at this time. Shift uneventful. Call light within reach, able to make needs knows, fall precautions in place. Will continue to monitor.  Electronically signed by Murphy Mayorga RN on 2/26/2021 at 7:26 PM

## 2021-02-27 NOTE — PROGRESS NOTES
Patient tolerating full liquid diet. Patient has had no complaints of nausea or episodes of vomiting. Patient changed to general diet per MD order. Snacks provided to patient, patient tolerated general diet well.

## 2021-02-27 NOTE — PROGRESS NOTES
Patient A&O. No complaints at this time. Shift uneventful. Call light within reach, able to make needs knows, fall precautions in place. Will continue to monitor. .Electronically signed by Beto Del Castillo RN on 2/27/2021 at 6:51 AM

## 2021-02-27 NOTE — CARE COORDINATION
Sw spoke with patient regarding dc needs. Patient reported that he plans to return home and resume [facility]. He denied other needs at the present time. Ciera spoke with Sanjeev Veronica at [facility] regarding patient being outpatient in a bed here for Right knee hematoma s/p revision R TKA; no new home care orders needed as not admitted.      Electronically signed by KAMARI Hart, HARISH, Case Management on 2/27/2021 at 9:35 AM  [facility] [phone]

## 2021-03-01 ENCOUNTER — OFFICE VISIT (OUTPATIENT)
Dept: ORTHOPEDIC SURGERY | Age: 51
End: 2021-03-01
Payer: COMMERCIAL

## 2021-03-01 ENCOUNTER — TELEPHONE (OUTPATIENT)
Dept: ORTHOPEDIC SURGERY | Age: 51
End: 2021-03-01

## 2021-03-01 VITALS — TEMPERATURE: 97.8 F

## 2021-03-01 DIAGNOSIS — Z96.651 HISTORY OF TOTAL KNEE ARTHROPLASTY, RIGHT: Primary | ICD-10-CM

## 2021-03-01 PROCEDURE — 99024 POSTOP FOLLOW-UP VISIT: CPT | Performed by: ORTHOPAEDIC SURGERY

## 2021-03-01 PROCEDURE — L1830 KO IMMOB CANVAS LONG PRE OTS: HCPCS | Performed by: ORTHOPAEDIC SURGERY

## 2021-03-01 NOTE — PROGRESS NOTES
[facility]  Office Visit    Chief Complaint: Follow-up s/p right total knee arthroplasty    HPI:  Maira Quevedo is a 53 y.o. who is here in follow-up of revision right total knee arthroplasty performed on 2/16/2021. He underwent incision and drainage of a hematoma on February 26, 2021. He was discharged from the hospital 2 days ago. He comes in today earlier than scheduled due to recurrent drainage from his right knee wound that started this morning. It is draining from the anterior midline incision now. He has not had any falls or interval injuries. He denies fever. 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  Chondromalacia of patellofemoral joint    Chronic pain syndrome    History of stroke    Gastroesophageal reflux disease with esophagitis--on daily ppi    Intractable chronic migraine without aura and with status migrainosus    Iron deficiency anemia    B12 deficiency    Malignant neoplasm of left kidney (HCC) clear cell. 8/1/18 Dr Dhiraj Salinas.     Failed total knee, right, initial encounter ([address]    Infection of total knee replacement ([address]    History of depression    History of alcoholism ([address]    Infection of prosthetic right knee joint ([address]    History of total knee arthroplasty, right    Hematoma of right knee region       ROS:  Constitutional: denies fever, chills, weight loss  MSK: denies pain in other joints, muscle aches  Neurological: denies numbness, tingling, weakness    Medications:  Current Outpatient Medications on File Prior to Visit   Medication Sig Dispense Refill    HYDROcodone-acetaminophen (NORCO) 5-325 MG per tablet Take 1-2 tablets by mouth every 4 hours as needed for Pain for up to 5 days. 60 tablet 0    ondansetron (ZOFRAN-ODT) 4 MG disintegrating tablet Take 4 mg by mouth every 8 hours as needed      zolpidem (AMBIEN) 10 MG tablet Take 10 mg by mouth nightly as needed.  doxycycline hyclate (VIBRAMYCIN) 100 MG capsule Take 1 capsule by mouth 2 times daily 60 capsule 1    aspirin 81 MG EC tablet Take 1 tablet by mouth daily for 14 days Take twice a day for 14 days after knee surgery then resume daily dosing. 28 tablet 0    sildenafil (REVATIO) 20 MG tablet Take 4 tablets by mouth as needed (30 min prior to intercourse) 30 tablet 0    FLUoxetine (PROZAC) 20 MG capsule TAKE 1 CAPSULE BY MOUTH DAILY 90 capsule 1    traZODone (DESYREL) 100 MG tablet Take 2 tablets by mouth nightly 180 tablet 0    atorvastatin (LIPITOR) 40 MG tablet Take 1 tablet by mouth nightly At bedtime 90 tablet 0    pantoprazole (PROTONIX) 40 MG tablet Take 1 tablet by mouth 2 times daily      calcium-vitamin D (OSCAL 500/200 D-3) 500-200 MG-UNIT per tablet Take 1 tablet by mouth 2 times daily       Multiple Vitamin TABS Take 1 tablet by mouth daily        No current facility-administered medications on file prior to visit. Exam:  Temperature 97.8 °F (36.6 °C), temperature source Temporal.    Appearance: sitting in exam room chair, appears to be in no acute distress, awake and alert  Resp: unlabored breathing on room air  Skin: warm, dry and intact with out erythema or significant increased temperature  Neuro: grossly intact both lower extremities. Intact sensation to light touch. Motor exam 4+ to 5/5 in all major motor groups.   Right knee: Distal part of the anterior midline incision with a very small area of dehiscence with persistent serosanguineous drainage. Sensation is intact light touch. There is brisk capillary refill. Dorsalis pedis and posterior tibial pulses are intact. There is 5/5 muscle strength in all muscle groups. Imaging:  None performed today    Assessment:  S/p revision right total knee arthroplasty and status post I&D of hematoma    Plan:  He is starting to recollect fluid in his prepatellar space and this is draining through a small portion of the distal anterior midline incision. Cultures taken from the OR remain negative to date. I applied a Prevena wound VAC today in the clinic to help with seal off this small area of drainage and collect any fluid that does drain. He remains on oral antibiotics. He was also placed in a knee immobilizer today to prevent him from bending his knee until this incision further heals. He will follow-up at the end of this week for a wound check. Procedures    Breg Knee Immobilizer Brace     Patient was prescribed a Breg Knee Immobilizer. The right knee will require stabilization / immobilization from this semi-rigid / rigid orthosis to improve their function. The orthosis will assist in protecting the affected area, provide functional support and facilitate healing. The prefabricated orthosis was modified in the following manner to provide a customizable fit for the patient at the time of delivery. 1. Identification of appropriate positioning and alignment of anatomical landmarks. 2. Trimming of straps and panels. Reassemble orthosis to specifically fit patient. The patient was educated and fit by a healthcare professional with expert knowledge and specialization in brace application while under the direct supervision of the treating physician. Verbal and written instructions for the use of and application of this item were provided.    They were instructed to contact the office immediately should the brace result in increased pain, decreased sensation, increased

## 2021-03-02 ENCOUNTER — TELEPHONE (OUTPATIENT)
Dept: ORTHOPEDIC SURGERY | Age: 51
End: 2021-03-02

## 2021-03-02 ENCOUNTER — CARE COORDINATION (OUTPATIENT)
Dept: CASE MANAGEMENT | Age: 51
End: 2021-03-02

## 2021-03-02 DIAGNOSIS — Z96.651 HISTORY OF TOTAL KNEE ARTHROPLASTY, RIGHT: ICD-10-CM

## 2021-03-02 RX ORDER — HYDROCODONE BITARTRATE AND ACETAMINOPHEN 5; 325 MG/1; MG/1
1-2 TABLET ORAL EVERY 4 HOURS PRN
Qty: 60 TABLET | Refills: 0 | Status: SHIPPED | OUTPATIENT
Start: 2021-03-02 | End: 2021-03-08 | Stop reason: SDUPTHER

## 2021-03-02 NOTE — TELEPHONE ENCOUNTER
I called the patient and told him we are taking care of getting everything he needs for the wound vac

## 2021-03-02 NOTE — CARE COORDINATION
[facility] Transitions Follow Up Call    3/2/2021    Patient: Gloria Ball  Patient : 1970   MRN: [id number]  Reason for Admission: Right total knee arthroplasty, knee infection  Discharge Date: 21 RARS: Readmission Risk Score: 23      Care Transitions Subsequent and Final Call    Subsequent and Final Calls  Care Transitions Interventions  Other Interventions:       Attempted to reach patient via phone for  post hospital transition call. VM left  with my contact information requesting a return call. Patient returned call, left VM. Writer called patient back, he stated has no fever at this time. Estefany Millard stated still having pain and pain medicine brings it down to a 7 and then goes back up to an 8 in a couple of hours. He stated now has a wound vac and the cannister is about half full and is getting another cannister to use if this one fills up. He stated also has an immobilizer on and PT is still coming, stating they had some exercises he can do even with immobilizer.           Follow Up  Future Appointments   Date Time Provider Timothy Beal   3/5/2021 10:15 AM MD KATIE Michael ORTHO DAR   3/19/2021 12:20 PM Jacqui Sanon APRN - CNP [facility] SLEEP MED [facility] [place] RN

## 2021-03-02 NOTE — TELEPHONE ENCOUNTER
General Question     Subject: He has some questions regarding his wound vac    Patient and /or Facility Request: pt    Contact Number: [phone]

## 2021-03-03 ENCOUNTER — TELEPHONE (OUTPATIENT)
Dept: FAMILY MEDICINE CLINIC | Age: 51
End: 2021-03-03

## 2021-03-03 NOTE — TELEPHONE ENCOUNTER
I have same day appts on Friday or you can double book him at 1:40 with the AWV (that patient is very healthy). OK to see Amina in the office. If he refuses, I could do a quick virtual visit. I just need to talk through his symptoms to see if he needs further evaluation.

## 2021-03-03 NOTE — TELEPHONE ENCOUNTER
Informed front to call him to change virtual to in office on Friday. He is down at 1:20.  If he wants or can keep as virtual.

## 2021-03-03 NOTE — TELEPHONE ENCOUNTER
----- Message from Franklyn Almanzar sent at 3/2/2021  5:47 PM EST -----  Subject: Appointment Request    Reason for Call: Urgent (Patient Request) No Script    QUESTIONS  Type of Appointment? Established Patient  Reason for appointment request? No appointments available during search  Additional Information for Provider? Pt called to schedule vv per Dr. Tish Gaitan for 03/03. Dr. Tish Gaitan requests vv due to pt having possible   TIA symptoms 03/02 and Dr. Tish aGitan sent message to patient in [address] to   schedule vv for 03/03.   ---------------------------------------------------------------------------  --------------  CALL BACK INFO  What is the best way for the office to contact you? OK to leave message on   voicemail  Preferred Call Back Phone Number? [phone]  ---------------------------------------------------------------------------  --------------  SCRIPT ANSWERS  Relationship to Patient? Self  Appointment reason? Symptomatic  Select script based on patient symptoms? Adult No Script   (Is the patient requesting to see the provider for a procedure?)? No  (Is the patient requesting to see the provider urgently  today or   tomorrow. )? Yes  Have you been diagnosed with   tested for   or told that you are suspected of having COVID-19 (Coronavirus)? Yes  Did your symptoms begin or have you been tested for COVID-19 in the last   10 days?  Yes

## 2021-03-04 ENCOUNTER — TELEPHONE (OUTPATIENT)
Dept: ORTHOPEDIC SURGERY | Age: 51
End: 2021-03-04

## 2021-03-04 ENCOUNTER — ANESTHESIA EVENT (OUTPATIENT)
Dept: OPERATING ROOM | Age: 51
End: 2021-03-04
Payer: COMMERCIAL

## 2021-03-04 NOTE — TELEPHONE ENCOUNTER
Auth: NPR  Date: 3/05/2021  Reference # None  Spoke with: None  Type of SX: Outpatient  Location: [facility]  CPT [id number]    SX area: Rt knee  Insurance: Iza

## 2021-03-04 NOTE — PROGRESS NOTES
For your comfort, please wear simple loose fitting clothing to the hospital.  Please do not bring valuables. Do not wear any make-up or nail polish on your fingers or toes      For your safety, please do not wear any jewelry or body piercing's on the day of surgery. All jewelry must be removed. If you have dentures, they will be removed before going to operating room. For your convenience, we will provide you with a container. If you wear contact lenses or glasses, they will be removed, please bring a case for them. If you have a living will and a durable power of  for healthcare, please bring in a copy. As part of our patient safety program to minimize surgical site infections, we ask you to do the following:    · Please notify your surgeon if you develop any illness between         now and the  day of your surgery. · This includes a cough, cold, fever, sore throat, nausea,         or vomiting, and diarrhea, etc.  ·  Please notify your surgeon if you experience dizziness, shortness         of breath or blurred vision between now and the time of your surgery. Do not shave your operative site 96 hours prior to surgery. For face and neck surgery, men may use an electric razor 48 hours   prior to surgery. You may shower the night before surgery or the morning of   your surgery with an antibacterial soap. You will need to bring a photo ID and insurance card    [facility] has an onsite pharmacy, would you like to utilize our pharmacy     If you will be staying overnight and use a C-pap machine, please bring   your C-pap to hospital     Our goal is to provide you with excellent care, therefore, visitors will be limited to two(2) in the room at a time so that we may focus on providing this care for you. Please contact pre-admission testing if you have any further questions.                  [facility] [address] phone number:  [phone] PAT fax number:  [phone] Please note these are generalized instructions for all surgical cases, you may be provided with more specific instructions according to your surgery. Preoperative Screening for Elective Surgery/Invasive Procedures While COVID-19 present in the community    ? Have you tested positive or have been told to self-isolate for COVID-19 like symptoms within the past 28 days? ? Do you currently have any of the following symptoms? o Fever >100.0 F or 99.9 F in immunocompromised patients? o New onset cough, shortness of breath or difficulty breathing?  o New onset sore throat, myalgia (muscle aches and pains), headache, loss of taste/smell or diarrhea? ? Have you had a potential exposure to COVID-19 within the past 14 days by:  o Close contact with a confirmed case? o Close contact with a healthcare worker,  or essential infrastructure worker (grocery store, [employer], gas station, public utilities or transportation)? o Do you reside in a congregate setting such as; skilled nursing facility, adult home, correctional facility, homeless shelter or other institutional setting?  o Have you had recent travel to a known COVID-19 hotspot? Indicate if the patient has a positive screen by answering yes to one or more of the above questions. Patients who test positive or screen positive prior to surgery or on the day of surgery should be evaluated in conjunction with the surgeon/proceduralist/anesthesiologist to determine the urgency of the procedure.

## 2021-03-05 ENCOUNTER — HOSPITAL ENCOUNTER (OUTPATIENT)
Age: 51
Setting detail: OUTPATIENT SURGERY
Discharge: HOME | End: 2021-03-05
Attending: ORTHOPAEDIC SURGERY | Admitting: ORTHOPAEDIC SURGERY
Payer: COMMERCIAL

## 2021-03-05 ENCOUNTER — ANESTHESIA (OUTPATIENT)
Dept: OPERATING ROOM | Age: 51
End: 2021-03-05
Payer: COMMERCIAL

## 2021-03-05 VITALS
HEIGHT: 72 IN | RESPIRATION RATE: 16 BRPM | DIASTOLIC BLOOD PRESSURE: 65 MMHG | HEART RATE: 67 BPM | TEMPERATURE: 98 F | OXYGEN SATURATION: 96 % | WEIGHT: 212.08 LBS | SYSTOLIC BLOOD PRESSURE: 124 MMHG | BODY MASS INDEX: 28.73 KG/M2

## 2021-03-05 VITALS
DIASTOLIC BLOOD PRESSURE: 69 MMHG | TEMPERATURE: 96.8 F | SYSTOLIC BLOOD PRESSURE: 116 MMHG | OXYGEN SATURATION: 100 % | RESPIRATION RATE: 2 BRPM

## 2021-03-05 LAB — SARS-COV-2, NAAT: NOT DETECTED

## 2021-03-05 PROCEDURE — 7100000000 HC PACU RECOVERY - FIRST 15 MIN: Performed by: ORTHOPAEDIC SURGERY

## 2021-03-05 PROCEDURE — 6360000002 HC RX W HCPCS: Performed by: NURSE ANESTHETIST, CERTIFIED REGISTERED

## 2021-03-05 PROCEDURE — 2580000003 HC RX 258: Performed by: ANESTHESIOLOGY

## 2021-03-05 PROCEDURE — 7100000001 HC PACU RECOVERY - ADDTL 15 MIN: Performed by: ORTHOPAEDIC SURGERY

## 2021-03-05 PROCEDURE — 87635 SARS-COV-2 COVID-19 AMP PRB: CPT

## 2021-03-05 PROCEDURE — 3600000004 HC SURGERY LEVEL 4 BASE: Performed by: ORTHOPAEDIC SURGERY

## 2021-03-05 PROCEDURE — 6360000002 HC RX W HCPCS: Performed by: ANESTHESIOLOGY

## 2021-03-05 PROCEDURE — 7100000011 HC PHASE II RECOVERY - ADDTL 15 MIN: Performed by: ORTHOPAEDIC SURGERY

## 2021-03-05 PROCEDURE — 3600000014 HC SURGERY LEVEL 4 ADDTL 15MIN: Performed by: ORTHOPAEDIC SURGERY

## 2021-03-05 PROCEDURE — 3700000000 HC ANESTHESIA ATTENDED CARE: Performed by: ORTHOPAEDIC SURGERY

## 2021-03-05 PROCEDURE — 6360000002 HC RX W HCPCS: Performed by: ORTHOPAEDIC SURGERY

## 2021-03-05 PROCEDURE — 3700000001 HC ADD 15 MINUTES (ANESTHESIA): Performed by: ORTHOPAEDIC SURGERY

## 2021-03-05 PROCEDURE — 7100000010 HC PHASE II RECOVERY - FIRST 15 MIN: Performed by: ORTHOPAEDIC SURGERY

## 2021-03-05 PROCEDURE — 2500000003 HC RX 250 WO HCPCS: Performed by: NURSE ANESTHETIST, CERTIFIED REGISTERED

## 2021-03-05 PROCEDURE — 2580000003 HC RX 258: Performed by: ORTHOPAEDIC SURGERY

## 2021-03-05 PROCEDURE — 2720000010 HC SURG SUPPLY STERILE: Performed by: ORTHOPAEDIC SURGERY

## 2021-03-05 PROCEDURE — 2500000003 HC RX 250 WO HCPCS: Performed by: ORTHOPAEDIC SURGERY

## 2021-03-05 PROCEDURE — 2709999900 HC NON-CHARGEABLE SUPPLY: Performed by: ORTHOPAEDIC SURGERY

## 2021-03-05 RX ORDER — BUPIVACAINE HYDROCHLORIDE 5 MG/ML
INJECTION, SOLUTION EPIDURAL; INTRACAUDAL
Status: COMPLETED | OUTPATIENT
Start: 2021-03-05 | End: 2021-03-05

## 2021-03-05 RX ORDER — HYDROCODONE BITARTRATE AND ACETAMINOPHEN 5; 325 MG/1; MG/1
1 TABLET ORAL ONCE
Status: CANCELLED | OUTPATIENT
Start: 2021-03-05

## 2021-03-05 RX ORDER — FENTANYL CITRATE 50 UG/ML
25 INJECTION, SOLUTION INTRAMUSCULAR; INTRAVENOUS EVERY 5 MIN PRN
Status: DISCONTINUED | OUTPATIENT
Start: 2021-03-05 | End: 2021-03-05 | Stop reason: HOSPADM

## 2021-03-05 RX ORDER — PROPOFOL 10 MG/ML
INJECTION, EMULSION INTRAVENOUS PRN
Status: DISCONTINUED | OUTPATIENT
Start: 2021-03-05 | End: 2021-03-05 | Stop reason: SDUPTHER

## 2021-03-05 RX ORDER — SODIUM CHLORIDE 0.9 % (FLUSH) 0.9 %
10 SYRINGE (ML) INJECTION PRN
Status: DISCONTINUED | OUTPATIENT
Start: 2021-03-05 | End: 2021-03-05 | Stop reason: HOSPADM

## 2021-03-05 RX ORDER — LABETALOL HYDROCHLORIDE 5 MG/ML
5 INJECTION, SOLUTION INTRAVENOUS EVERY 10 MIN PRN
Status: DISCONTINUED | OUTPATIENT
Start: 2021-03-05 | End: 2021-03-05 | Stop reason: HOSPADM

## 2021-03-05 RX ORDER — DEXAMETHASONE SODIUM PHOSPHATE 4 MG/ML
INJECTION, SOLUTION INTRA-ARTICULAR; INTRALESIONAL; INTRAMUSCULAR; INTRAVENOUS; SOFT TISSUE PRN
Status: DISCONTINUED | OUTPATIENT
Start: 2021-03-05 | End: 2021-03-05 | Stop reason: SDUPTHER

## 2021-03-05 RX ORDER — MIDAZOLAM HYDROCHLORIDE 1 MG/ML
INJECTION INTRAMUSCULAR; INTRAVENOUS PRN
Status: DISCONTINUED | OUTPATIENT
Start: 2021-03-05 | End: 2021-03-05 | Stop reason: SDUPTHER

## 2021-03-05 RX ORDER — LIDOCAINE HYDROCHLORIDE 20 MG/ML
INJECTION, SOLUTION EPIDURAL; INFILTRATION; INTRACAUDAL; PERINEURAL PRN
Status: DISCONTINUED | OUTPATIENT
Start: 2021-03-05 | End: 2021-03-05 | Stop reason: SDUPTHER

## 2021-03-05 RX ORDER — SODIUM CHLORIDE 0.9 % (FLUSH) 0.9 %
10 SYRINGE (ML) INJECTION EVERY 12 HOURS SCHEDULED
Status: DISCONTINUED | OUTPATIENT
Start: 2021-03-05 | End: 2021-03-05 | Stop reason: HOSPADM

## 2021-03-05 RX ORDER — MIDAZOLAM HYDROCHLORIDE 1 MG/ML
2 INJECTION INTRAMUSCULAR; INTRAVENOUS ONCE
Status: COMPLETED | OUTPATIENT
Start: 2021-03-05 | End: 2021-03-05

## 2021-03-05 RX ORDER — FENTANYL CITRATE 50 UG/ML
INJECTION, SOLUTION INTRAMUSCULAR; INTRAVENOUS PRN
Status: DISCONTINUED | OUTPATIENT
Start: 2021-03-05 | End: 2021-03-05 | Stop reason: SDUPTHER

## 2021-03-05 RX ORDER — MAGNESIUM HYDROXIDE 1200 MG/15ML
LIQUID ORAL CONTINUOUS PRN
Status: COMPLETED | OUTPATIENT
Start: 2021-03-05 | End: 2021-03-05

## 2021-03-05 RX ORDER — ONDANSETRON 2 MG/ML
INJECTION INTRAMUSCULAR; INTRAVENOUS PRN
Status: DISCONTINUED | OUTPATIENT
Start: 2021-03-05 | End: 2021-03-05 | Stop reason: SDUPTHER

## 2021-03-05 RX ORDER — SODIUM CHLORIDE 9 MG/ML
INJECTION, SOLUTION INTRAVENOUS CONTINUOUS
Status: DISCONTINUED | OUTPATIENT
Start: 2021-03-05 | End: 2021-03-05 | Stop reason: HOSPADM

## 2021-03-05 RX ADMIN — CEFAZOLIN SODIUM 2000 MG: 10 INJECTION, POWDER, FOR SOLUTION INTRAVENOUS at 11:58

## 2021-03-05 RX ADMIN — SODIUM CHLORIDE: 9 INJECTION, SOLUTION INTRAVENOUS at 12:32

## 2021-03-05 RX ADMIN — DEXAMETHASONE SODIUM PHOSPHATE 4 MG: 4 INJECTION, SOLUTION INTRAMUSCULAR; INTRAVENOUS at 12:10

## 2021-03-05 RX ADMIN — HYDROMORPHONE HYDROCHLORIDE 0.5 MG: 1 INJECTION, SOLUTION INTRAMUSCULAR; INTRAVENOUS; SUBCUTANEOUS at 13:22

## 2021-03-05 RX ADMIN — MIDAZOLAM 2 MG: 1 INJECTION INTRAMUSCULAR; INTRAVENOUS at 10:39

## 2021-03-05 RX ADMIN — FENTANYL CITRATE 50 MCG: 50 INJECTION INTRAMUSCULAR; INTRAVENOUS at 12:03

## 2021-03-05 RX ADMIN — HYDROMORPHONE HYDROCHLORIDE 0.5 MG: 1 INJECTION, SOLUTION INTRAMUSCULAR; INTRAVENOUS; SUBCUTANEOUS at 13:46

## 2021-03-05 RX ADMIN — ONDANSETRON 4 MG: 2 INJECTION INTRAMUSCULAR; INTRAVENOUS at 12:17

## 2021-03-05 RX ADMIN — HYDROMORPHONE HYDROCHLORIDE 0.5 MG: 1 INJECTION, SOLUTION INTRAMUSCULAR; INTRAVENOUS; SUBCUTANEOUS at 12:58

## 2021-03-05 RX ADMIN — MIDAZOLAM 2 MG: 1 INJECTION INTRAMUSCULAR; INTRAVENOUS at 11:58

## 2021-03-05 RX ADMIN — SODIUM CHLORIDE: 9 INJECTION, SOLUTION INTRAVENOUS at 10:32

## 2021-03-05 RX ADMIN — LIDOCAINE HYDROCHLORIDE 80 MG: 20 INJECTION, SOLUTION EPIDURAL; INFILTRATION; INTRACAUDAL; PERINEURAL at 12:06

## 2021-03-05 RX ADMIN — FENTANYL CITRATE 50 MCG: 50 INJECTION INTRAMUSCULAR; INTRAVENOUS at 12:17

## 2021-03-05 RX ADMIN — PROPOFOL 280 MG: 10 INJECTION, EMULSION INTRAVENOUS at 12:06

## 2021-03-05 ASSESSMENT — PULMONARY FUNCTION TESTS
PIF_VALUE: 8
PIF_VALUE: 13
PIF_VALUE: 3
PIF_VALUE: 2
PIF_VALUE: 8
PIF_VALUE: 1
PIF_VALUE: 2
PIF_VALUE: 0
PIF_VALUE: 1
PIF_VALUE: 3
PIF_VALUE: 1
PIF_VALUE: 1
PIF_VALUE: 2

## 2021-03-05 ASSESSMENT — PAIN SCALES - GENERAL
PAINLEVEL_OUTOF10: 7
PAINLEVEL_OUTOF10: 0
PAINLEVEL_OUTOF10: 7

## 2021-03-05 ASSESSMENT — PAIN - FUNCTIONAL ASSESSMENT
PAIN_FUNCTIONAL_ASSESSMENT: PREVENTS OR INTERFERES SOME ACTIVE ACTIVITIES AND ADLS
PAIN_FUNCTIONAL_ASSESSMENT: 0-10

## 2021-03-05 ASSESSMENT — PAIN DESCRIPTION - FREQUENCY
FREQUENCY: CONTINUOUS
FREQUENCY: CONTINUOUS

## 2021-03-05 ASSESSMENT — PAIN DESCRIPTION - PAIN TYPE
TYPE: SURGICAL PAIN
TYPE: SURGICAL PAIN

## 2021-03-05 ASSESSMENT — PAIN DESCRIPTION - LOCATION
LOCATION: KNEE

## 2021-03-05 ASSESSMENT — PAIN DESCRIPTION - DESCRIPTORS
DESCRIPTORS: SHARP
DESCRIPTORS: SHARP
DESCRIPTORS: ACHING;SHARP

## 2021-03-05 ASSESSMENT — PAIN DESCRIPTION - ORIENTATION
ORIENTATION: RIGHT

## 2021-03-05 ASSESSMENT — PAIN DESCRIPTION - ONSET
ONSET: ON-GOING
ONSET: ON-GOING

## 2021-03-05 NOTE — PROGRESS NOTES
Hand off report from [facility]. Patient opens eyes to name. Patient C/O pain to right knee at 8 of 10 and medicated per order. VSS. IV patent to left forearm infusing slowly.

## 2021-03-05 NOTE — PROGRESS NOTES
Patient dozing off and on, resting more comfortably. VSS. IV patent. DEANA wound vac system patent to right knee wound with pump on flashing green.

## 2021-03-05 NOTE — OP NOTE
Patient: Magdaleno Dior  YOB: 1970  MRN: [id number]    DATE OF PROCEDURE: 3/5/2021      PREOPERATIVE DIAGNOSIS:   Postoperative hematoma of right knee status post revision total knee arthroplasty     POSTOPERATIVE DIAGNOSIS:   Same     OPERATION PERFORMED:   Incision and drainage of right knee hematoma     SURGEON:  Chitra Palomino MD     ASSISTANT:  JEMAL Salvador    EBL: 20 mL      INDICATIONS:  The patient is a 53 y.o. male who underwent revision right total knee arthroplasty on February 16, 2021 and subsequent evacuation of hematoma on February 26, 2021. He is now draining from his anterior midline incision. This has persisted despite an incisional wound VAC. I recommended surgical incision and drainage. We went over the risks and complications of surgery including; bleeding, infection, decreased ROM, continued pain, instability, fracture, dislocation, neurovascular injury, post op cognitive disorder, DVT, pulmonary embolism and need for further surgical procedures. The patient understands these issues and signed informed consent for surgery. OPERATIVE PROCEDURE:  The patient was brought to the operating room. Once anesthetic was obtained and intravenous antibiotics delivered, the knee was prepped and draped in a sterile fashion. A 2 cm portion of the distal anterior midline incision over the knee where there had been active drainage was reopened. There was a large subcutaneous seroma that was evacuated. The wound was thoroughly irrigated with sterile saline. 3-0 Monocryl was used for subcutaneous closure. 3-0 nylon was used to reinforce the entire anterior midline knee incision. A ursula incisional wound vacuum dressing was applied. The patient tolerated the procedure well. He was brought to the recovery room in good condition.     Anna Rosen MD  3/5/2021

## 2021-03-05 NOTE — ANESTHESIA PRE PROCEDURE
[facility] Department of Anesthesiology  Pre-Anesthesia Evaluation/Consultation       Name:  Joey Dorantes  : 1970  Age:  53 y.o. MRN:  [id number]  Date: 3/5/2021           Surgeon: Surgeon(s):  Nalleyl Chavez MD    Procedure: Procedure(s):  INCISION AND DRAINAGE AND CLOSURE RIGHT TOTAL KNEE     Allergies   Allergen Reactions    Nsaids Nausea Only and Other (See Comments)     Hx of Barretts esophagus      Morphine Hives and Itching     Pt gets Hives/itching. 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  Chondromalacia of patellofemoral joint    Chronic pain syndrome    History of stroke    Gastroesophageal reflux disease with esophagitis--on daily ppi    Intractable chronic migraine without aura and with status migrainosus    Iron deficiency anemia    B12 deficiency    Malignant neoplasm of left kidney (HCC) clear cell. 18 Dr Tin Pozo.     Failed total knee, right, initial encounter [facility] [address]    Infection of total knee replacement ([address]    History of depression    History of alcoholism ([address]    Infection of prosthetic right knee joint ([address]  History of total knee arthroplasty, right    Hematoma of right knee region     Past Medical History:   Diagnosis Date    Alcoholism ([address]     last drink 2015    Allergic migraine with status migrainosus     Allergic rhinitis, seasonal     Anemia     Arthritis     right knee    Vaughn's esophagus     Benign intracranial hypertension     Cancer (HCC)     renal     Carpal tunnel syndrome     Chronic pain     Depression     GERD (gastroesophageal reflux disease)     Headache(784.0)     History of blood transfusion     History of tobacco use     Quit 7/2014    Migraine     chronic for 1 year    Morbid obesity ([facility] [address]     Movement disorder     Onychomycosis     Sleep apnea, obstructive     Severe uses cpap stop bang 6    Unspecified cerebral artery occlusion with cerebral infarction 2014    slight weakness in left arm    Use of cane as ambulatory aid     Wears dentures     full set    Wears glasses      Past Surgical History:   Procedure Laterality Date    ABDOMEN SURGERY      gastric bypass    ABDOMEN SURGERY  4-7-2016    repair of recurrent incisional hernia with mesh, removal of old mesh, bilateral component separation    ABDOMINAL EXPLORATION SURGERY  1/11/16    exp lap, lysis of adhesions, small bowel resection    CARPAL TUNNEL RELEASE      bilat    DILATATION, ESOPHAGUS      ENDOSCOPY, COLON, DIAGNOSTIC      EYE SURGERY      GASTRIC BYPASS SURGERY  Jan 2009    Has lost about 200 pounds.     HERNIA REPAIR  3-    ventral    JOINT REPLACEMENT      KIDNEY REMOVAL Left 08/01/2018    KNEE ARTHROSCOPY Right 7/11/2013    Dr.Robert Sanders     KNEE ARTHROSCOPY Right 7/11/12    RIGHT KNEE ARTHROSCOPY WITH CHONDROPLASTY    KNEE SURGERY  July 2012    right, arthroscopy    KNEE SURGERY Right 2/18/2020    INCISION AND DRAINAGE RIGHT KNEE AND WOULD VAC PLACEMENT performed by Izzy Pacheco MD at . [place] [address] Right 2/26/2021 INCISION AND DRAINAGE OF RIGHT KNEE HEMATOMA performed by Pasquale Bro MD at [address]  2005    OTHER SURGICAL HISTORY Left 03/08/2016    CT biopsy ablasion left kidney    OTHER SURGICAL HISTORY  2016    small intestine 12 inch removed, 2 hernia surgeries, another surgery to drain infection from incision.  REVISION TOTAL KNEE ARTHROPLASTY Right 12/17/2019    RIGHT REVISION TIBIA TOTAL KNEE REPLACEMENT performed by Rosas Rhodes MD at [address] [facility] Right 1/22/2020    RIGHT KNEE IRRIGATION AND DEBRIDEMENT WITH POLY EXCHANGE performed by Bridger Arias MD at [facility] Right 2/16/2021    RIGHT REMOVAL OF EXPLANT AND TOTAL KNEE REPLACEMENT [facility]  10/15/13    RIGHT    TOTAL KNEE ARTHROPLASTY Right 8/12/2020    RIGHT ARTHROPLASY  RESECTION WITH INSERTION OF SPACER performed by Rosas Rhodes MD at [address]  6-7-2016    UPPER GASTROINTESTINAL ENDOSCOPY  04/02/2018     Social History     Tobacco Use    Smoking status: Former Smoker     Packs/day: 0.50     Years: 25.00     Pack years: 12.50     Types: Cigarettes     Quit date: 7/31/2017     Years since quitting: 3.5    Smokeless tobacco: Never Used   Substance Use Topics    Alcohol use: No     Alcohol/week: 0.0 standard drinks     Comment: last drink 2015    Drug use: Never     Medications  No current facility-administered medications on file prior to encounter. Current Outpatient Medications on File Prior to Encounter   Medication Sig Dispense Refill    HYDROcodone-acetaminophen (NORCO) 5-325 MG per tablet Take 1-2 tablets by mouth every 4 hours as needed for Pain for up to 5 days.  60 tablet 0    ondansetron (ZOFRAN-ODT) 4 MG disintegrating tablet Take 4 mg by mouth every 8 hours as needed  zolpidem (AMBIEN) 10 MG tablet Take 10 mg by mouth nightly as needed.  doxycycline hyclate (VIBRAMYCIN) 100 MG capsule Take 1 capsule by mouth 2 times daily 60 capsule 1    aspirin 81 MG EC tablet Take 1 tablet by mouth daily for 14 days Take twice a day for 14 days after knee surgery then resume daily dosing.  28 tablet 0    sildenafil (REVATIO) 20 MG tablet Take 4 tablets by mouth as needed (30 min prior to intercourse) 30 tablet 0    FLUoxetine (PROZAC) 20 MG capsule TAKE 1 CAPSULE BY MOUTH DAILY 90 capsule 1    traZODone (DESYREL) 100 MG tablet Take 2 tablets by mouth nightly 180 tablet 0    atorvastatin (LIPITOR) 40 MG tablet Take 1 tablet by mouth nightly At bedtime 90 tablet 0    pantoprazole (PROTONIX) 40 MG tablet Take 1 tablet by mouth 2 times daily      calcium-vitamin D (OSCAL 500/200 D-3) 500-200 MG-UNIT per tablet Take 1 tablet by mouth 2 times daily       Multiple Vitamin TABS Take 1 tablet by mouth daily        Current Facility-Administered Medications   Medication Dose Route Frequency Provider Last Rate Last Admin    0.9 % sodium chloride infusion   Intravenous Continuous Sayda Brown MD        sodium chloride flush 0.9 % injection 10 mL  10 mL Intravenous 2 times per day Sayda Brown MD        sodium chloride flush 0.9 % injection 10 mL  10 mL Intravenous PRN Sayda Brown MD        ceFAZolin (ANCEF) 2000 mg in dextrose 5 % 100 mL IVPB  2,000 mg Intravenous Once Maria Alejandra MD Surya         Vital Signs (Current)   Vitals:    21 1002   BP: 119/71   Pulse: 72   Resp: 17   Temp: 99.6 °F (37.6 °C)   SpO2: 97%     Vital Signs Statistics (for past 48 hrs)     Temp  Av.6 °F (37.6 °C)  Min: 99.6 °F (37.6 °C)   Min taken time: 21 1002  Max: 99.6 °F (37.6 °C)   Max taken time: 21 1002  Pulse  Av  Min: 67   Min taken time: 21 1002  Max: 67   Max taken time: 21 Resp  Av  Min: 16   Min taken time: 21 1002  Max: 17   Max taken time: 21 1002  BP  Min: 119/71   Min taken time: 21 1002  Max: 119/71   Max taken time: 21 1002  SpO2  Av %  Min: 97 %   Min taken time: 21 1002  Max: 97 %   Max taken time: 21 1002    BP Readings from Last 3 Encounters:   21 119/71   21 (!) 105/59   21 126/73     BMI  Body mass index is 28.76 kg/m². Estimated body mass index is 28.76 kg/m² as calculated from the following:    Height as of this encounter: 6' (1.829 m). Weight as of this encounter: 212 lb 1.3 oz (96.2 kg). CBC   Lab Results   Component Value Date    WBC 10.1 2021    RBC 3.17 2021    RBC 4.94 2015    HGB 9.1 2021    HCT 28.0 2021    MCV 88.5 2021    RDW 17.7 2021     2021     CMP    Lab Results   Component Value Date     2021    K 3.8 2021     2021    CO2 26 2021    BUN 14 2021    CREATININE 1.1 2021    GFRAA >60 2021    AGRATIO 1.4 2021    LABGLOM >60 2021    GLUCOSE 104 2021    GLUCOSE 84 2015    PROT 6.3 2021    PROT 6.7 2015    CALCIUM 9.0 2021    BILITOT 0.8 2021    ALKPHOS 109 2021    AST 13 2021    ALT <5 2021     BMP    Lab Results   Component Value Date     2021    K 3.8 2021     2021    CO2 26 2021    BUN 14 2021    CREATININE 1.1 2021    CALCIUM 9.0 2021    GFRAA >60 2021    LABGLOM >60 2021    GLUCOSE 104 2021    GLUCOSE 84 2015     POCGlucose  No results for input(s): GLUCOSE in the last 72 hours.    [facility]    Lab Results   Component Value Date    PROTIME 13.4 2021    INR 1.15 2021    APTT 37.6      HCG (If Applicable) No results found for: PREGTESTUR, PREGSERUM, HCG, HCGQUANT ABGs No results found for: PHART, PO2ART, TRN9HJS, KRD9IER, BEART, H5EEBYHS   Type & Screen (If Applicable)  No results found for: LABABO, LABRH                         BMI: Wt Readings from Last 3 Encounters:       NPO Status:   Date of last liquid consumption: 03/05/21   Time of last liquid consumption: 0730(Sip with med)   Date of last solid food consumption: 03/04/21      Time of last solid consumption: 2000       Anesthesia Evaluation  Patient summary reviewed no history of anesthetic complications:   Airway: Mallampati: III  TM distance: >3 FB   Neck ROM: full   Dental:    (+) edentulous      Pulmonary:normal exam    (+) sleep apnea: on CPAP,                             Cardiovascular:  Exercise tolerance: good (>4 METS),   (+) hypertension (EF 55):,       ECG reviewed  Rhythm: regular  Rate: normal  Echocardiogram reviewed         Beta Blocker:  Not on Beta Blocker         Neuro/Psych:   (+) CVA (left sided weakness):, neuromuscular disease:, headaches:, psychiatric history:depression/anxiety             GI/Hepatic/Renal:   (+) GERD:,          ROS comment: S/P gastric bypass. Endo/Other: Negative Endo/Other ROS                     ROS comment: ETOH Abdominal:           Vascular: negative vascular ROS. Anesthesia Plan      general     ASA 3       Induction: intravenous. MIPS: Postoperative opioids intended and Prophylactic antiemetics administered. Anesthetic plan and risks discussed with patient and spouse. Plan discussed with CRNA.               This pre-anesthesia assessment may be used as a history and physical.    DOS STAFF ADDENDUM: Pt seen and examined, chart reviewed (including anesthesia, drug and allergy history). No interval changes to history and physical examination. Anesthetic plan, risks, benefits, alternatives, and personnel involved discussed with patient. Questions and concerns addressed. Patient(family) verbalized an understanding and agrees to proceed.       Jules Hoang MD  March 5, 2021  10:26 AM

## 2021-03-05 NOTE — PROGRESS NOTES
Patient admitted to PACU # 8 from OR at 1237 post [address] [facility]   per Dr. Bea Baker. Attached to PACU monitoring system and report received from anesthesia provider. Patient was reported to be hemodynamically stable during procedure.   Patient drowsy on admission and RR >10

## 2021-03-05 NOTE — PROGRESS NOTES
IV to left arm not infusing and unable to flush. IV restarted in right forearm per Antonietta Ronquillo. IV patent. Patient medicated for C/O right knee pain at 8 of 10 per order. See MAR. VSS. IV patent.

## 2021-03-05 NOTE — ANESTHESIA POSTPROCEDURE EVALUATION
Department of Anesthesiology  Postprocedure Note    Patient: Bobbi Jewell  MRN: [id number]  YOB: 1970  Date of evaluation: 3/5/2021  Time:  2:55 PM     Procedure Summary     Date: 03/05/21 Room / Location: [address]    Anesthesia Start: 1200 Anesthesia Stop: 2818    Procedure: INCISION AND DRAINAGE AND CLOSURE RIGHT TOTAL KNEE (Right ) Diagnosis: (RIGHT KNEE DRAINING WOUND)    Surgeons: Lela Gomez MD Responsible Provider: Joe Celis MD    Anesthesia Type: general ASA Status: 3          Anesthesia Type: general    Cande Phase I: Cande Score: 10    Cande Phase II: Cande Score: 10    Last vitals: Reviewed and per EMR flowsheets.        Anesthesia Post Evaluation    Patient location during evaluation: PACU  Patient participation: complete - patient participated  Level of consciousness: awake and alert  Airway patency: patent  Nausea & Vomiting: no nausea and no vomiting  Complications: no  Cardiovascular status: hemodynamically stable  Respiratory status: acceptable  Hydration status: stable

## 2021-03-05 NOTE — PROGRESS NOTES
Dr. Gaurav Rayo called to review patients C/O pain at 7 of 10 with desats to 95%. No further IV narcotic at this time. Order for po medication noted for discharge. Patient awake and alert. Right knee ace wrap dressing dry and intact. DEANA wound vac system intact to right knee with controller flashing on/green. Patient denies C/O nausea. Patient stable to transfer to ACU for phase II.

## 2021-03-05 NOTE — H&P
Update History & Physical    The patient's History and Physical of February 26, 2021 was reviewed with the patient and I examined the patient. Plan for repeat I&D of right knee. The surgical site was confirmed by the patient and me. Plan: The risks, benefits, expected outcome, and alternative to the recommended procedure have been discussed with the patient. Patient understands and wants to proceed with the procedure.      Electronically signed by Jay Beard MD on 3/5/2021 at 11:46 AM

## 2021-03-05 NOTE — PROGRESS NOTES
Alert and oriented. No c/o. Vss. Denies nausea or pain. Dressing and incisional vac are clean dry intact. Toes are warm,move well, lexus well. Family at bedside. Verbalized understanding of discharge instructions. Tolerated sitting up and po fluids and crackers well.

## 2021-03-08 ENCOUNTER — TELEPHONE (OUTPATIENT)
Dept: ORTHOPEDIC SURGERY | Age: 51
End: 2021-03-08

## 2021-03-08 DIAGNOSIS — Z96.651 HISTORY OF TOTAL KNEE ARTHROPLASTY, RIGHT: ICD-10-CM

## 2021-03-08 LAB
ANAEROBIC CULTURE: NORMAL
CULTURE SURGICAL: NORMAL
GRAM STAIN RESULT: NORMAL

## 2021-03-08 RX ORDER — HYDROCODONE BITARTRATE AND ACETAMINOPHEN 5; 325 MG/1; MG/1
1-2 TABLET ORAL EVERY 4 HOURS PRN
Qty: 60 TABLET | Refills: 0 | Status: SHIPPED | OUTPATIENT
Start: 2021-03-08 | End: 2021-03-13

## 2021-03-08 RX ORDER — CYCLOBENZAPRINE HCL 10 MG
10 TABLET ORAL 3 TIMES DAILY PRN
Qty: 60 TABLET | Refills: 0 | Status: SHIPPED | OUTPATIENT
Start: 2021-03-08 | End: 2021-03-28

## 2021-03-08 NOTE — TELEPHONE ENCOUNTER
[facility]: Would like to know how much ROM patient is allowed now that he is in knee brace. Also, swelling is going down but pain has increased. Patient does not feel pain medication is working.     Call Back: Jones Grayson [phone]

## 2021-03-08 NOTE — DISCHARGE SUMMARY
Physician Discharge Summary     Patient ID:  Juan Luis Blanca  [id number]  89 y.o.  1970    Admit date: 2/16/2021    Discharge date and time: 2/17/2021 12:09 PM     Admitting Physician: Jessica Reyes MD     Discharge Physician: Gina Galeas    Admission Diagnoses: Prosthetic joint infection, initial encounter [facility]) Aldo John  History of total knee arthroplasty, right [Z96.651]    Discharge Diagnoses: Prosthetic joint infection, initial encounter ([facility] [address] Aldo Lopez  History of total knee arthroplasty, right [Z96.651]    Admission Condition: good    Discharged Condition: good    Indication for Admission: Post antibiotic therapies for  Right TKA infection as outpatient This patient was then electively scheduled for total joint replacement revision surgery    Surgical procedure: right total knee arthroplasty revision    Consults: PT OT SS    This patient had no postoperative complications. They has PT and OT for ADL's . IV and PO pain med for pain control and was eventually DC in stable condition    Treatments: analgesia,  therapies: PT OT,  and surgery      Disposition: home    Patient Instructions:   [unfilled]  Activity: activity as tolerated  Diet: regular diet  Wound Care: keep wound clean and dry    Follow-up with JANET Scruggs in 2 weeks.     Lawrence Seen [facility]  3/8/2021  3:32 PM

## 2021-03-10 ENCOUNTER — CARE COORDINATION (OUTPATIENT)
Dept: CASE MANAGEMENT | Age: 51
End: 2021-03-10

## 2021-03-10 ENCOUNTER — TELEMEDICINE (OUTPATIENT)
Dept: FAMILY MEDICINE CLINIC | Age: 51
End: 2021-03-10
Payer: COMMERCIAL

## 2021-03-10 DIAGNOSIS — G45.9 TIA (TRANSIENT ISCHEMIC ATTACK): Primary | ICD-10-CM

## 2021-03-10 PROCEDURE — 99214 OFFICE O/P EST MOD 30 MIN: CPT | Performed by: FAMILY MEDICINE

## 2021-03-10 NOTE — CARE COORDINATION
Spoke briefly with patient, Samuel Lee. He stated he was at [facility] with his son and stated he could call writer later. Writer stated would try to call patient tomorrow. Patient agreeable to that plan.

## 2021-03-10 NOTE — PROGRESS NOTES
3/10/2021    TELEHEALTH EVALUATION -- Audio/Visual (During ONEIR-58 public health emergency)    HPI:    Judd Rodriguez (:  1970) has requested an audio/video evaluation for the following concern(s):    Chief Complaint   Patient presents with    Transient Ischemic Attack     pt thinks he may have had a tia last week, he had loss of  in his left hand his speech became slured, his mom made him turn and smile but it was gone very quickly      Has had recent revision or R TKA with Dr Jacquie Cardenas, done on , had postop bleeding that requried drainage twice. Seems OK now. Has decent ROM. Decent pain control. Is able to bear weight now without increased pain. He is concerned about a weird event once week ago 3/2 (4 days after surgery). He was standing in the kitchen getting a drink, had sudden weakness of his left hand- dropped cane. Hand felt 'numb' or not there, clumsy. Could not . Used R hand to hold cane and do tasks. Then he felt his speech became slurred like the left side of his mouth drooped, according to mother. Could not talk right. Then it suddenly got better. Mother was uncertain if the droop was actually there because it happened so fast. The entire event lasted less than 5 minutes. He had a similar situation  while on vacation in [place], [place] with LOC, left sided weakness, admitted to hospital with resolution of symptoms. He had stroke work up at [facility] upon return in 2014 with MRI evidence of an infarct R insular cortex. CArotids duplex and ECHO were normal.  EEG done  for 'spells' was normal.    Risk factors: + tobacco use 4-5/day, MJ use (stopped with knee surgery). Does use ASA 81 and atorva 40 since prior stroke. LDL very low. Does not have HTN.     Lab Results   Component Value Date    CHOL 106 2021    TRIG 103 2021    HDL 52 2021    LDLCALC 33 2021     Lab Results   Component Value Date    ALT <5 (L) 2021    AST 13 (L) 2021 No symptoms since most recent symptoms. He has not seen neurology in some years. Hx RCC. No indication of recurrence. 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  Chondromalacia of patellofemoral joint    Chronic pain syndrome    History of stroke    Gastroesophageal reflux disease with esophagitis--on daily ppi    Intractable chronic migraine without aura and with status migrainosus    Iron deficiency anemia    B12 deficiency    Malignant neoplasm of left kidney (HCC) clear cell. 8/1/18 Dr Wilfredo Sánchez.  Failed total knee, right, [facility]    Infection of total knee replacement ([facility]    History of depression    History of alcoholism ([facility]    Infection of prosthetic right knee joint ([facility]    History of total knee arthroplasty, right    Hematoma of right knee region        Prior to Visit Medications    Medication Sig Taking? Authorizing Provider   HYDROcodone-acetaminophen (NORCO) 5-325 MG per tablet Take 1-2 tablets by mouth every 4 hours as needed for Pain for up to 5 days. Yes Duke Langley MD   cyclobenzaprine (FLEXERIL) 10 MG tablet Take 1 tablet by mouth 3 times daily as needed for Muscle spasms Yes Duke Langley MD   ondansetron (ZOFRAN-ODT) 4 MG disintegrating tablet Take 4 mg by mouth every 8 hours as needed Yes Historical Provider, MD   zolpidem (AMBIEN) 10 MG tablet Take 10 mg by mouth nightly as needed.  Yes Historical Provider, MD   doxycycline hyclate (VIBRAMYCIN) 100 MG capsule Take 1 capsule by mouth 2 times daily Yes LILLIE Lay CNP   aspirin 81 MG EC tablet Take 1 tablet by mouth daily for 14 days Take twice a day for 14 days after knee surgery then resume daily dosing. Yes LILLIE Lay CNP   sildenafil (REVATIO) 20 MG tablet Take 4 tablets by mouth as needed (30 min prior to intercourse) Yes Annette James MD   FLUoxetine (PROZAC) 20 MG capsule TAKE 1 CAPSULE BY MOUTH DAILY Yes Annette James MD   traZODone (DESYREL) 100 MG tablet Take 2 tablets by mouth nightly Yes Cecilia Hendricks MD   atorvastatin (LIPITOR) 40 MG tablet Take 1 tablet by mouth nightly At bedtime Yes Annette James MD   pantoprazole (PROTONIX) 40 MG tablet Take 1 tablet by mouth 2 times daily Yes Annette James MD   calcium-vitamin D (OSCAL 500/200 D-3) 500-200 MG-UNIT per tablet Take 1 tablet by mouth 2 times daily  Yes Historical Provider, MD   Multiple Vitamin TABS Take 1 tablet by mouth daily  Yes Historical Provider, MD       Social History     Tobacco Use    Smoking status: Former Smoker     Packs/day: 0.50     Years: 25.00     Pack years: 12.50     Types: Cigarettes     Quit date: 7/31/2017     Years since quitting: 3.6    Smokeless tobacco: Never Used   Substance Use Topics    Alcohol use: No     Alcohol/week: 0.0 standard drinks     Comment: last drink 2015    Drug use: Never            PHYSICAL EXAMINATION:  Physical Exam  Constitutional:       General: He is not in acute distress. Appearance: Normal appearance. He is not ill-appearing. HENT:      Head: Normocephalic and atraumatic. Neck:      Musculoskeletal: Normal range of motion. Pulmonary:      Effort: Pulmonary effort is normal.   Musculoskeletal: Normal range of motion. Skin:     Findings: No rash. Neurological:      General: No focal deficit present.       Mental Status: He is alert and oriented to person, place, and time. Mental status is at baseline. Psychiatric:         Mood and Affect: Mood normal.         Behavior: Behavior normal.         Thought Content: Thought content normal.         Judgment: Judgment normal.          ASSESSMENT/PLAN:    1. TIA (transient ischemic attack) 3/2/21  - agree that his sx suggest TIA. Possibly postop as trigger. Has hx prior CVA 2014. No prior venous thrombotic events. Proceed with MRI to see if new ischemic events since 2014 are evident. - check for carotid vascular disease.  - I am opting to have him see cardiology to see if loop recorder or other cardiac eval is needed since he has had recurrent cerebrovascular events at a relatively young age (though has more chronic medical issues than his age suggests and continues to smoke). - continue asa/atorva    - US CAROTID ARTERY BILATERAL; Future  - MRI BRAIN W WO CONTRAST; Future  - Trinity Koch MD, Cardiology, [facility]      Return in about 4 weeks (around 4/7/2021) for fu TIA. Viviana Rios is a 53 y.o. male being evaluated by a Virtual Visit (video visit) encounter to address concerns as mentioned above. A caregiver was present when appropriate. Due to this being a TeleHealth encounter (During GAAXC-14 public health emergency), evaluation of the following organ systems was limited: Vitals/Constitutional/EENT/Resp/CV/GI//MS/Neuro/Skin/Heme-Lymph-Imm. Pursuant to the emergency declaration under the [address] [place] [address], [address] [place] [address] authority and the [employer] Act, this Virtual Visit was conducted with patient's (and/or legal guardian's) consent, to reduce the patient's risk of exposure to COVID-19 and provide necessary medical care. The patient (and/or legal guardian) has also been advised to contact this office for worsening conditions or problems, and seek emergency medical treatment and/or call 911 if deemed necessary. Patient identification was verified at the start of the visit: Yes    Total time spent on this encounter: 21 or more minutes were spent on the digital evaluation and management of this patient. Services were provided through a video synchronous discussion virtually to substitute for in-person clinic visit. Patient and provider were located at their individual homes. --Rudi Fabry, MD on 3/10/2021 at 9:48 AM    An electronic signature was used to authenticate this note.

## 2021-03-11 ENCOUNTER — CARE COORDINATION (OUTPATIENT)
Dept: CASE MANAGEMENT | Age: 51
End: 2021-03-11

## 2021-03-11 NOTE — CARE COORDINATION
[facility] Transitions Follow Up Call    3/11/2021    Patient: Princess Blackburn  Patient : 1970   MRN: [id number]  Reason for Admission: RTK arthroplasty prosthesis, knee infection  Discharge Date: 3/5/21 RARS: Readmission Risk Score: 23        Care Transitions Subsequent and Final Call    Subsequent and Final Calls  Care Transitions Interventions  Other Interventions:       Attempted to reach patient via phone for post hospital transition call. VM left with my contact information requesting a return call.         Follow Up  Future Appointments   Date Time Provider Timothy Beal   3/15/2021  2:30 PM Shyanne Merrill MD  ORTHO [facility]   3/19/2021 12:20 PM Jacqui Ingram, APRN - CNP FF SLEEP MED [facility]   3/29/2021  3:15 PM Celia Wetzel MD [facility]   2021  3:40 PM Valeriy Hall MD [facility]       Guido Ku RN

## 2021-03-12 DIAGNOSIS — Z96.651 HISTORY OF TOTAL KNEE ARTHROPLASTY, RIGHT: Primary | ICD-10-CM

## 2021-03-12 RX ORDER — OXYCODONE HYDROCHLORIDE 5 MG/1
5-10 TABLET ORAL EVERY 6 HOURS PRN
Qty: 42 TABLET | Refills: 0 | Status: SHIPPED | OUTPATIENT
Start: 2021-03-12 | End: 2021-03-18 | Stop reason: SDUPTHER

## 2021-03-15 ENCOUNTER — OFFICE VISIT (OUTPATIENT)
Dept: ORTHOPEDIC SURGERY | Age: 51
End: 2021-03-15

## 2021-03-15 VITALS — TEMPERATURE: 97.5 F | HEIGHT: 72 IN | BODY MASS INDEX: 28.44 KG/M2 | WEIGHT: 210 LBS

## 2021-03-15 DIAGNOSIS — Z96.651 HISTORY OF TOTAL KNEE ARTHROPLASTY, RIGHT: Primary | ICD-10-CM

## 2021-03-15 PROCEDURE — 99024 POSTOP FOLLOW-UP VISIT: CPT | Performed by: ORTHOPAEDIC SURGERY

## 2021-03-15 RX ORDER — DOXYCYCLINE HYCLATE 100 MG/1
100 CAPSULE ORAL 2 TIMES DAILY
Qty: 60 CAPSULE | Refills: 0 | Status: SHIPPED | OUTPATIENT
Start: 2021-03-15 | End: 2021-04-14

## 2021-03-15 NOTE — PROGRESS NOTES
There is no erythema or drainage. Sensation is intact light touch. There is brisk capillary refill. Dorsalis pedis and posterior tibial pulses are intact. There is 5/5 muscle strength in all muscle groups. Imaging:  None performed today    Assessment:  S/p revision right total knee arthroplasty and status post I&D of hematoma    Plan:  He will continue activity as tolerated and weightbearing as tolerated. He will remain active in physical therapy. He continues to take antibiotics per ID. Percocet was prescribed last week and he will wean off of this as tolerated. He will follow up in 7 to 10 days for wound check and likely suture removal.    This dictation was done with [employer] dictation and may contain mechanical errors related to translation.

## 2021-03-16 RX ORDER — TRAZODONE HYDROCHLORIDE 100 MG/1
200 TABLET ORAL NIGHTLY
Qty: 180 TABLET | Refills: 0 | Status: SHIPPED | OUTPATIENT
Start: 2021-03-16 | End: 2021-07-23 | Stop reason: SDUPTHER

## 2021-03-17 ENCOUNTER — TELEPHONE (OUTPATIENT)
Dept: ORTHOPEDIC SURGERY | Age: 51
End: 2021-03-17

## 2021-03-17 DIAGNOSIS — Z96.651 HISTORY OF TOTAL KNEE ARTHROPLASTY, RIGHT: ICD-10-CM

## 2021-03-18 ENCOUNTER — CARE COORDINATION (OUTPATIENT)
Dept: CASE MANAGEMENT | Age: 51
End: 2021-03-18

## 2021-03-18 DIAGNOSIS — T84.50XD PROSTHETIC JOINT INFECTION, SUBSEQUENT ENCOUNTER: Primary | ICD-10-CM

## 2021-03-18 RX ORDER — OXYCODONE HYDROCHLORIDE 5 MG/1
5 TABLET ORAL EVERY 4 HOURS PRN
Qty: 42 TABLET | Refills: 0 | Status: SHIPPED | OUTPATIENT
Start: 2021-03-18 | End: 2021-03-24 | Stop reason: SDUPTHER

## 2021-03-18 NOTE — CARE COORDINATION
[facility] Transitions Follow Up Call    3/18/2021    Patient: Davy Lagunas  Patient : 1970   MRN: [id number]  Reason for Admission: R total knee arthroplasty prosthesis  knee infection  Discharge Date: 3/5/21 RARS: Readmission Risk Score: 23      Needs to be reviewed by the provider   Additional needs identified to be addressed with provider No  none           Method of communication with provider : none    Discussed COVID-19 related testing which was not done at this time. Test results were not done. Patient informed of results, if available? Not done this admission    Care Transition Nurse (CTN) contacted the patient by telephone to follow up after admission on 2021. Verified name and  with patient as identifiers. Addressed changes since last contact: symptom management-pain, knee drainage  Follow up appointment completed? Yes     Discussed appropriate site of care based on symptoms and resources available to patient including: PCP and Specialist. The patient agrees to contact the PCP office for questions related to their healthcare. Patients top risk factors for readmission: medical condition  Interventions to address risk factors: Obtained and reviewed discharge summary and/or continuity of care documents    CTN provided contact information for future needs. Writer spoke with patient, Leonela Espinoza. He stated he was doing \"pretty good\". He stated in a fair amount of pain but is being controlled by pain medication. He stated starting outpatient therapy next week. Writer informed patient this is final outreach. Care Transitions Subsequent and Final Call    Subsequent and Final Calls  Care Transitions Interventions  Other Interventions:            Follow Up  Future Appointments   Date Time Provider Timothy Beal   3/19/2021 12:20 PM LILLIE Espinosa - CNP FF SLEEP MED [facility]   3/22/2021  3:30 PM Karmen Kay PT REYNA MARIN PT [facility] FERNANDO   3/24/2021  8:45 AM Sonny Gates Romina Duque, PT [facility] [place] [facility]   3/25/2021  1:45 PM Lela Gomez MD [facility]   3/26/2021  8:45 AM Heike Duque, PT [facility] [place] [facility]   3/29/2021  8:45 AM Jimi Levi, PTA [facility] [place] [facility]   3/29/2021  3:15 PM Goran Do MD [facility]   3/31/2021  8:45 AM Jimi Levi, PTA [facility] [place] [facility]   4/1/2021  7:15 AM [facility] 1 [facility]   4/1/2021  8:30 AM SCHEDULE, [facility] VASCULAR ROOM 1 [facility]   4/2/2021  8:00 AM Tre Hong, PT [facility] [place] [facility]   4/5/2021  4:15 PM Tre Hong, PT [facility] [place] [facility]   4/7/2021  8:00 AM Jimi Levi, PTA [facility] [place] [facility]   4/9/2021  2:00 PM Tre Hong, PT [facility] [place] [facility]   4/12/2021  3:30 PM Tre Hong, PT [facility] [place] [facility]   4/14/2021  8:00 AM Tre Hong, PT [facility] [place] [facility]   4/16/2021  1:00 PM Tre Hong, PT [facility] [place] [facility]   4/26/2021  3:40 PM Geronimo Capps MD [facility]       Wendy Gary RN

## 2021-03-19 ENCOUNTER — VIRTUAL VISIT (OUTPATIENT)
Dept: PULMONOLOGY | Age: 51
End: 2021-03-19
Payer: COMMERCIAL

## 2021-03-19 DIAGNOSIS — K21.00 GASTROESOPHAGEAL REFLUX DISEASE WITH ESOPHAGITIS WITHOUT HEMORRHAGE: ICD-10-CM

## 2021-03-19 DIAGNOSIS — G47.33 OBSTRUCTIVE SLEEP APNEA: Primary | Chronic | ICD-10-CM

## 2021-03-19 DIAGNOSIS — F51.04 PSYCHOPHYSIOLOGICAL INSOMNIA: Chronic | ICD-10-CM

## 2021-03-19 DIAGNOSIS — J30.2 SEASONAL ALLERGIC RHINITIS, UNSPECIFIED TRIGGER: Chronic | ICD-10-CM

## 2021-03-19 PROCEDURE — 99214 OFFICE O/P EST MOD 30 MIN: CPT | Performed by: NURSE PRACTITIONER

## 2021-03-19 RX ORDER — ZOLPIDEM TARTRATE 10 MG/1
10 TABLET ORAL NIGHTLY PRN
Qty: 90 TABLET | Refills: 1 | Status: SHIPPED | OUTPATIENT
Start: 2021-03-19 | End: 2021-06-07 | Stop reason: SDUPTHER

## 2021-03-19 ASSESSMENT — SLEEP AND FATIGUE QUESTIONNAIRES
HOW LIKELY ARE YOU TO NOD OFF OR FALL ASLEEP IN A CAR, WHILE STOPPED FOR A FEW MINUTES IN TRAFFIC: 0
ESS TOTAL SCORE: 2
HOW LIKELY ARE YOU TO NOD OFF OR FALL ASLEEP WHILE SITTING QUIETLY AFTER LUNCH WITHOUT ALCOHOL: 0
HOW LIKELY ARE YOU TO NOD OFF OR FALL ASLEEP WHILE SITTING INACTIVE IN A PUBLIC PLACE: 0
HOW LIKELY ARE YOU TO NOD OFF OR FALL ASLEEP WHILE LYING DOWN TO REST IN THE AFTERNOON WHEN CIRCUMSTANCES PERMIT: 1

## 2021-03-19 NOTE — PROGRESS NOTES
Thang Tyson         : 1970    Diagnosis: [x] OSCAR (G47.33) [] CSA (G47.31) [] Apnea (G47.30)   Length of Need: [x] 12 Months [] 99 Months [] Other:    Machine (ALFREDA!): [x] Respironics Dream Station      Auto [] ResMed AirSense     Auto [] Other:     []  CPAP () [] Bilevel ()   Mode: [] Auto [] Spontaneous    Mode: [] Auto [] Spontaneous                            Comfort Settings:   - Ramp Pressure:  cmH2O                                        - Ramp time: 15 min                                     -  Flex/EPR - 3 full time                                    - For ResMed Bilevel (TiMax-4 sec   TiMin- 0.2 sec)     Humidifier: [x] Heated ()        [x] Water chamber replacement ()/ 1 per 6 months        Mask:   [] Nasal () /1 per 3 months [x] Full Face () /1 per 3 months   [] Patient choice -Size and fit mask [x] Patient Choice - Size and fit mask   [] Dispense:  [] Dispense:    [] Headgear () / 1 per 3 months [x] Headgear () / 1 per 3 months   [] Replacement Nasal Cushion ()/2 per month [x] Interface Replacement ()/1 per month   [] Replacement Nasal Pillows ()/2 per month         Tubing: [x] Heated ()/1 per 3 months    [] Standard ()/1 per 3 months [] Other:           Filters: [x] Non-disposable ()/1 per 6 months     [x] Ultra-Fine, Disposable ()/2 per month        Miscellaneous: [] Chin Strap ()/ 1 per 6 months [] O2 bleed-in:       LPM   [] Oximetry on CPAP/Bilevel []  Other:    [x] Modem: ()         Start Order Date: 21    MEDICAL JUSTIFICATION:  I, the undersigned, certify that the above prescribed supplies are medically necessary for this patients wellbeing. In my opinion, the supplies are both reasonable and necessary in reference to accepted standards of medicalpractice in treatment of this patients condition.     LILLIE Mario - ELEAZAR      NPI: [id number]       Order Signed Date: 21    Electronically signed by LILLIE Doshi CNP on 3/19/2021 at 12:12 PM    Jose Angel Jones  1970  [facility] [address] [id number] [phone] (home)   [phone] (mobile)      Insurance Info (confirm with patient if correct):  Payor/Plan Subscr  Sex Relation Sub.  Ins. ID Effective Group Num

## 2021-03-19 NOTE — PROGRESS NOTES
Beryl Ramesh MD, FAASM, [facility]  Gildardo Cruz, SASHA, RN, [address] [address]  Dept: [phone]  Dept Fax: [phone]  Loc: [phone]    Subjective:     Patient ID: Laverne Conrad is a 53 y.o. male. Chief Complaint   Patient presents with    Sleep Apnea       HPI:      Sleep Medicine Video Visit    Pursuant to the emergency declaration under the [address] [place] [address], [facility] waiver authority and the [employer] Act this Telephone Visit was insisted, with patient's consent, to reduce the patient's risk of exposure to COVID-19 and provide continuity of care for an established patient. Services were provided through a synchronous discussion over a telephone and/or Video chat to substitute for in-person clinic visit, and coded as such. While patient is at home.     Machine Modem/Download Info:  Compliance (hours/night): 7.5 hrs/night  Download AHI (/hour): 2.4 /HR     Average IPAP Pressure: 14.9 cmH2O  Average EPAP Pressure: 10 cmH2O         AUTO BIPAP - Settings (Christina)  IPAP Max: 25 cmH2O  EPAP Min: 10 cmH2O  Pressure Support Min: 3  Pressure Support Max: 7             Comfort Settings  Humidity Level (0-8): 4  Flex/EPR (0-3): 3 PAP Mask  Mask Type: Full Face mask  Clinically Relevant Leak: No     [place] - Total score: 2    Follow-up :     Last Visit : September 2020      Subjective Health Changes: None      Over Night Oximetry: [] Yes  [] No  [x] NA [] WNL   Using O2: [] Yes  [] No  [x] NA   Patient is compliant with the machine  [x] Yes  [] No [] Per patient   Feeling rested when using the machine   [x] Yes  [] No     Pressure is comfortable with inspiration and expiration  [x] Yes  [] No ([x] NA [] Feeling of suffocation [] Feeling like not enough air  [] To much pressure)     Noticed changes in pressure   [] Yes  [] No  [x] NA   Mask is fitting well  [x] Yes  [] No   Noting Mask Air Leak  [] Yes  [x] No   Having painful Aerophagia  [] Yes  [x] No   Nocturia   0-1  per night. Having  HA upon waking  [] Yes  [x] No   Dry mouth upon waking   Dry Nose  Dry Eyes  [x] Yes  [] No   Congestion upon waking   [] Yes  [x] No    Nose Bleeds  [] Yes  [x] No   Using Sleep Aides  Ambien   [] OTC  [x] Per our office  Gabepentin/Trazodone   [] Per another provider  [] NA   Understands how to change humidification and/or tubing temperature for comfort while at home  [x] Yes  [] No     Difficulties falling asleep  [] Yes  [x] No   Difficulties staying asleep  [] Yes  [x] No   Approximate time to bed  9:30-10pm   Approximate wake time  7-8am   Taking Naps  no   If taking naps usual length    [x] NA   If taking naps using the machine  [] Yes  [] No  [x] NA [] With and With out    Drowsy when driving  [] Yes  [x] No     Does patient carry a DOT/CDL  [] Yes  [x] No     Does patient carry FAA/[employer] License   [] Yes  [x] No      Any concerns noted with the machine at this time  [] Yes  [x] No       Assessment/Plan:   1. Obstructive sleep apnea,Severe -(on cpap)  Assessment & Plan:  After downloading data and reviewing  Reviewed compliance download with pt. Supplies and parts as needed for his machine. These are medically necessary. Continue medications per his PCP and other physicians. Limit caffeine use after 3pm.    The chronic medical conditions listed are directly related to the primary diagnosis listed above. The management of the primary diagnosis affects the secondary diagnosis and vice vers    2. Psychophysiological insomnia  Assessment & Plan:  Chronic- stable. After speaking with patient:    Agree with current plan  Orders:  -     zolpidem (AMBIEN) 10 MG tablet; Take 1 tablet by mouth nightly as needed (insomnia) for up to 90 days. , Disp-90 tablet, R-1Normal  3.  Gastroesophageal reflux disease with esophagitis without hemorrhage Assessment & Plan:  Chronic- stable. After speaking with patient:    Agree with current plan, and would agree to continue this plan per prescribing and managing physician. 4. Seasonal allergic rhinitis, unspecified trigger  Assessment & Plan:  Chronic- stable. After speaking with patient:    Agree with current plan, and would agree to continue this plan per prescribing and managing physician. The chronic medical conditions listed are directly related to the primary diagnosis listed above. The management of the primary diagnosis affects the secondary diagnosis and vice versa. - After pulling data and reviewing it   - Reviewed compliance download with patient    -Medically necessary supplies and parts as needed for his machine.   - Continue medications per his primary care provider and other physicians.   - Encouraged to limit caffeine use after 3pm.    - Educated not to drive when feeling sleepy   - Patient using [employer]  - Tube Temperature  Humidifier  (if you are dry turn it high)  Pressure changes: Decrease EPAP 8  Psmin Decrease  2  Unplugging the machine to force over pressure changes  medication effects  Office locations    The chronic medical conditions listed are directly related to the primary diagnosis listed above. The management of the primary diagnosis affects the secondary diagnosis and vice versa.     - Will follow up in off in 6 months

## 2021-03-22 ENCOUNTER — HOSPITAL ENCOUNTER (OUTPATIENT)
Dept: PHYSICAL THERAPY | Age: 51
Setting detail: THERAPIES SERIES
Discharge: HOME | End: 2021-03-22
Payer: COMMERCIAL

## 2021-03-22 LAB
CULTURE, JOINT AEROBIC: NORMAL
CULTURE, JOINT ANAEROBIC: NORMAL
GRAM STAIN RESULT: NORMAL

## 2021-03-22 PROCEDURE — 97162 PT EVAL MOD COMPLEX 30 MIN: CPT

## 2021-03-22 PROCEDURE — 97110 THERAPEUTIC EXERCISES: CPT

## 2021-03-22 PROCEDURE — 97116 GAIT TRAINING THERAPY: CPT

## 2021-03-22 NOTE — PLAN OF CARE
[facility] Outpatient Rehabilitation and Therapy, [facility]  [address] [facility]  Phone: [phone]   Fax:     [phone]                                                       Physical Therapy Certification    Dear Referring Practitioner: Quang Chavira MD,    We had the pleasure of evaluating the following patient for physical therapy services at [facility] and Therapy. A summary of our findings can be found in the initial assessment below. This includes our plan of care. If you have any questions or concerns regarding these findings, please do not hesitate to contact me at the office phone number checked above. Thank you for the referral.       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   [] Yes, Patient intake triggered C-SSRS Screening      [] C-SSRS Screening completed  [] PCP notified via Epic     SUBJECTIVE: Patient stated complaint:  Patient states he underwent R TKR in 2013. Patient then started having issues with TKR in 2019 which involved taking out the spacer and placing a new one.   Patient developed an infection in knee after this procedure. In 2020, patient underwent a few I&D. In August 2020, implant was removed and spacer put. In February 2021, new implant was placed. He underwent incision and drainage of a hematoma on February 26, 2021 and again on March 5, 2021. Still continues to take antibiotics. To have sutures removed this week. Patient has been receiving home PT which ended recently. Currently using a cane for ambulation. Goes up stairs one at a time but goes down reciprocally. Relevant Medical History:Additional Pertinent Hx: Renal CA, CVA, anemia, chronic pain, depression, gastric bypass (2009), TIA (happenend a few weeks ago, follow up with cardiology scheduled)  Functional Scale/Score: WOMAC=     Pain Scale: 3-4/10  Easing factors: ice   Provocative factors: prolonged activity     Type: []Constant   [x]Intermittent  []Radiating []Localized []other:     Numbness/Tingling:pt denies    Occupation/School: works as a musician    Living Status/Prior Level of Function: Independent with ADLs and IADLs, walk without AD    OBJECTIVE:   Palpation: no TTP noted    Functional Mobility/Transfers: performs all independently    Posture: slightly flexed knee in standing    Bandages/Dressings/Incisions: incision healing with sutures in place, no signs of infection    Gait: (include devices/WB status) Patient ambulates with straight cane demonstrating decreased terminal knee extension at heel strike and midstance.      ROM LEFT RIGHT   HIP Flex     HIP Abd     HIP Ext     HIP IR     HIP ER     Knee ext 0 Active=Lacking 5 degrees  Passive=lacking 5 degrees   Knee Flex 120 Active=110  Qvbqvwl=412   Ankle PF     Ankle DF     Ankle In     Ankle Ev     Strength  LEFT RIGHT   HIP Flexors 5/5 3+/5   HIP Abductors 5/5 4/5   Knee EXT (quad) 5/5 3-/5   Knee Flex (HS) 5/5 3/5   Ankle DF 5/5 4-/5   Ankle PF 5/5 3/5        Edema   WNL Mod edema throughout R knee     Reflexes/Sensation:    [x]Dermatomes/Myotomes intact [x]Reflexes equal and normal bilaterally   []Other:    Joint mobility:    [x]Normal    []Hypo   []Hyper                         [x] Patient history, allergies, meds reviewed. Medical chart reviewed. See intake form. Review Of Systems (ROS):  [x]Performed Review of systems (Integumentary, CardioPulmonary, Neurological) by intake and observation. Intake form has been scanned into medical record. Patient has been instructed to contact their primary care physician regarding ROS issues if not already being addressed at this time. Co-morbidities/Complexities (which will affect course of rehabilitation):   []None           Arthritic conditions   []Rheumatoid arthritis (M05.9)  []Osteoarthritis (M19.91)   Cardiovascular conditions   []Hypertension (I10)  []Hyperlipidemia (E78.5)  []Angina pectoris (I20)  []Atherosclerosis (I70)  [x]CVA Musculoskeletal conditions   []Disc pathology   []Congenital spine pathologies   [x]Prior surgical intervention  []Osteoporosis (M81.8)  []Osteopenia (M85.8)   Endocrine conditions   []Hypothyroid (E03.9)  []Hyperthyroid Gastrointestinal conditions   []Constipation (T45.47)   Metabolic conditions   [x]Morbid obesity (E66.01)  []Diabetes type 1(E10.65) or 2 (E11.65)   []Neuropathy (G60.9)     Pulmonary conditions   []Asthma (J45)  []Coughing   []COPD (J44.9)   Psychological Disorders  []Anxiety (F41.9)  [x]Depression (F32.9)   []Other:   []Other:          Barriers to/and or personal factors that will affect rehab potential:              []Age  []Sex    []Smoker              []Motivation/Lack of Motivation                        [x]Co-Morbidities              []Cognitive Function, education/learning barriers              []Environmental, home barriers              []profession/work barriers  [x]past PT/medical experience  []other:  Justification: R knee surgeries x 7 since initial TKR    Falls Risk Assessment (30 days):   [x] Falls Risk assessed and no intervention required.   [] Falls Risk assessed and Patient requires intervention due to being higher risk   TUG score (>12s at risk):     [] Falls education provided, including         ASSESSMENT:   Functional Impairments:     []Noted lumbar/proximal hip/LE hypomobility   [x]Decreased LE functional ROM   []Decreased core/proximal hip strength and neuromuscular control   [x]Decreased LE functional strength   [x]Reduced balance/proprioceptive control   []other:      Functional Activity Limitations (from functional questionnaire and intake)   [x]Reduced ability to tolerate prolonged functional positions   []Reduced ability or difficulty with changes of positions or transfers between positions   [x]Reduced ability to maintain good posture and demonstrate good body mechanics with sitting, bending, and lifting   [x]Reduced ability to sleep   [x] Reduced ability or tolerance with driving and/or computer work   []Reduced ability to perform lifting, carrying tasks   [x]Reduced ability to squat   []Reduced ability to forward bend   [x]Reduced ability to ambulate prolonged functional periods/distances/surfaces   [x]Reduced ability to ascend/descend stairs   [x]Reduced ability to run, hop or jump   []other:     Participation Restrictions   [x]Reduced participation in self care activities   [x]Reduced participation in home management activities   [x]Reduced participation in work activities   [x]Reduced participation in social activities. []Reduced participation in sport activities. Classification :    [x]Signs/symptoms consistent with post-surgical status including decreased ROM, strength and function.    []Signs/symptoms consistent with joint sprain/strain   []Signs/symptoms consistent with patella-femoral syndrome   []Signs/symptoms consistent with knee OA/hip OA   []Signs/symptoms consistent with internal derangement of knee/Hip   []Signs/symptoms consistent with functional hip weakness/NMR control      []Signs/symptoms consistent with tendinitis/tendinosis    []signs/symptoms consistent with pathology which may benefit from Dry needling      []other:      Prognosis/Rehab Potential:      []Excellent   [x]Good    []Fair   []Poor    Tolerance of evaluation/treatment:    []Excellent   [x]Good    []Fair   []Poor    Physical Therapy Evaluation Complexity Justification  [x] A history of present problem with:  [] no personal factors and/or comorbidities that impact the plan of care;  []1-2 personal factors and/or comorbidities that impact the plan of care  [x]3 personal factors and/or comorbidities that impact the plan of care  [x] An examination of body systems using standardized tests and measures addressing any of the following: body structures and functions (impairments), activity limitations, and/or participation restrictions;:  [] a total of 1-2 or more elements   [x] a total of 3 or more elements   [] a total of 4 or more elements   [x] A clinical presentation with:  [] stable and/or uncomplicated characteristics   [x] evolving clinical presentation with changing characteristics  [] unstable and unpredictable characteristics;   [x] Clinical decision making of [] low, [x] moderate, [] high complexity using standardized patient assessment instrument and/or measurable assessment of functional outcome. [] EVAL (LOW) 64756 (typically 20 minutes face-to-face)  [x] EVAL (MOD) 92206 (typically 30 minutes face-to-face)  [] EVAL (HIGH) 62146 (typically 45 minutes face-to-face)  [] RE-EVAL     PLAN:  Frequency/Duration:  2 days per week for 4-6 Weeks:  Interventions:  [x]  Therapeutic exercise including: strength training, ROM, for Lower extremity and core   [x]  NMR activation and proprioception for LE, Glutes and Core   [x]  Manual therapy as indicated for LE, Hip and spine to include: Dry Needling/IASTM, STM, PROM, Gr I-IV mobilizations, manipulation.    [x] Modalities as needed that may include: thermal agents, E-stim, Biofeedback, US, iontophoresis as indicated  [x] Patient education on joint protection, postural re-education, activity modification, progression of HEP. [] Aquatic exercise including: strength training, ROM, and balance for Lower extremity and core     HEP instruction: Reviewed current HEP with patient. Added standing knee extension and knee flexion stretch on step. Patient verbalized/demonstrated understanding. GOALS:  Patient stated goal: \"Increase my strength\"  [] Progressing: [] Met: [] Not Met: [] Adjusted    Therapist goals for Patient:   Short Term Goals: To be achieved in: 2 weeks  1. Independent in HEP and progression per patient tolerance, in order to prevent re-injury. [] Progressing: [] Met: [] Not Met: [] Adjusted  2. Patient will have a decrease in pain by 40% to facilitate improvement in movement, function, and ADLs as indicated by Functional Deficits. [] Progressing: [] Met: [] Not Met: [] Adjusted    Long Term Goals: To be achieved in: at discharge  1. Disability index score of 25% or less for the [facility] to assist with reaching prior level of function. [] Progressing: [] Met: [] Not Met: [] Adjusted  2. Patient will demonstrate increased AROM to 5-120 to allow for proper joint functioning as indicated by patients Functional Deficits. [] Progressing: [] Met: [] Not Met: [] Adjusted  3. Patient will demonstrate an increase in Strength to 4+/5 to allow for proper functional mobility as indicated by patients Functional Deficits. [] Progressing: [] Met: [] Not Met: [] Adjusted  4. Patient will return to going up and down stairs reciprocally without increased symptoms or restriction. [] Progressing: [] Met: [] Not Met: [] Adjusted  5. Patient will be able to ambulate in community without AD demonstrating good gait pattern.     [] Progressing: [] Met: [] Not Met: [] Adjusted     Electronically signed by:  Carmen Tam PT , OMT-C,  [id number]      Note: If patient does not return for scheduled/recommended follow up visits, this note will serve as a discharge from care along with the most recent update on progress.

## 2021-03-22 NOTE — FLOWSHEET NOTE
[facility]  Outpatient Rehabilitation and Therapy, [facility]  [address] [facility], [facility]  Phone: [phone]   Fax:     [phone]      Physical Therapy Treatment Note/ Progress Report:     Date:  3/22/2021    Patient Name:  Magdaleno Dior    :  1970  MRN: [id number]    Pertinent Medical History: Renal CA, CVA, anemia, chronic pain, depression, gastric bypass (), TIA (happenend a few weeks ago, follow up with cardiology scheduled)    Medical/Treatment Diagnosis Information:  · Diagnosis: 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)  · Treatment Diagnosis: decreased strength, mobility    Insurance/Certification information:  PT Insurance Information: [facility]  Physician Information:  Referring Practitioner: Chitra Palomino MD  Plan of care signed (Y/N): sent for cosign 3/21    Date of Patient follow up with Physician: 3/25     Progress Report: []  Yes  [x]  No     Date Range for reporting period:  Beginning:  3/22/2021  Ending:      Progress report due (10 Rx/or 30 days whichever is less):     Recertification due (POC duration/ or 90 days whichever is less): 21    Visit # POC/Insurance Allowable Auth Needed    BOMN []Yes   [x]No     Latex Allergy:  [x]NO      []YES  Preferred Language for Healthcare:   [x]English       []Other:    Functional Scale:       Date assessed: at eval  Test: WOMAC  Score: 24/96= 25% disability    Pain level:  7/10     History of Injury: Patient states he underwent R TKR in 2013. Patient then started having issues with TKR in  which involved taking out the spacer and placing a new one. Patient developed an infection in knee after this procedure. In , patient underwent a few I&D. In 2020, implant was removed and spacer put. In 2021, new implant was placed. He underwent incision and drainage of a hematoma on 2021 and again on 2021.   Still Other Therapeutic Activities: Pt was educated on PT POC, Diagnosis, Prognosis, pathomechanics as well as frequency and duration of scheduling future physical therapy appointments. Time was also taken on this day to answer all patient questions and participation in PT. Reviewed appointment policy in detail with patient and patient verbalized understanding. Home Exercise Program: Reviewed current HEP with patient. Added standing knee extension and knee flexion stretch on step. Patient verbalized/demonstrated understanding. Therapeutic Exercise and NMR EXR  [x] (12366) Provided verbal/tactile cueing for activities related to strengthening, flexibility, endurance, ROM for improvements in LE, proximal hip, and core control with self care, mobility, lifting, ambulation.  [] (30409) Provided verbal/tactile cueing for activities related to improving balance, coordination, kinesthetic sense, posture, motor skill, proprioception  to assist with LE, proximal hip, and core control in self care, mobility, lifting, ambulation and eccentric single leg control. [facility] Activities:    [] (13648 or 10681) Provided verbal/tactile cueing for activities related to improving balance, coordination, kinesthetic sense, posture, motor skill, proprioception and motor activation to allow for proper function of core, proximal hip and LE with self care and ADLs and functional mobility.    [x] (80081) Gait Re-education- Provided training and instruction to the patient for proper LE, core and proximal hip recruitment and positioning and eccentric body weight control with ambulation re-education including up and down stairs     Home Exercise Program:    [x] (97128) Reviewed/Progressed HEP activities related to strengthening, flexibility, endurance, ROM of core, proximal hip and LE for functional self-care, mobility, lifting and ambulation/stair navigation   [] (00683)Reviewed/Progressed HEP activities related to improving balance, coordination, kinesthetic sense, posture, motor skill, proprioception of core, proximal hip and LE for self care, mobility, lifting, and ambulation/stair navigation      Manual Treatments:  PROM / STM / Oscillations-Mobs:  G-I, II, III, IV (PA's, Inf., Post.)  [] (32587) Provided manual therapy to mobilize LE, proximal hip and/or LS spine soft tissue/joints for the purpose of modulating pain, promoting relaxation,  increasing ROM, reducing/eliminating soft tissue swelling/inflammation/restriction, improving soft tissue extensibility and allowing for proper ROM for normal function with self care, mobility, lifting and ambulation. Charges:  Timed Code Treatment Minutes: 23   Total Treatment Minutes: 53      [] EVAL (LOW) 91120 (typically 20 minutes face-to-face)  [x] EVAL (MOD) 36433 (typically 30 minutes face-to-face)  [] EVAL (HIGH) 60056 (typically 45 minutes face-to-face)  [] RE-EVAL     [x] QV(65703) x     [] Dry needle 1 or 2 Muscles (35928)  [] NMR (81327) x     [] Dry needle 3+ Muscles (91783)  [] Manual (28438) x     [] Ultrasound (35976) x  [] TA (43061) x     [] Mech Traction (17782)  [] ES(attended) (37386)     [] ES (un) (71631):   [] Vasopump (21573)  [x] Gait (56872)  [] Other:    GOALS:  Patient stated goal: \"Increase my strength\"  []? Progressing: []? Met: []? Not Met: []? Adjusted     Therapist goals for Patient:   Short Term Goals: To be achieved in: 2 weeks  1. Independent in HEP and progression per patient tolerance, in order to prevent re-injury. []? Progressing: []? Met: []? Not Met: []? Adjusted  2. Patient will have a decrease in pain by 40% to facilitate improvement in movement, function, and ADLs as indicated by Functional Deficits. []? Progressing: []? Met: []? Not Met: []? Adjusted     Long Term Goals: To be achieved in: at discharge  1. Disability index score of 25% or less for the [facility] to assist with reaching prior level of function. []? Progressing: []?  Met: []? Not Met: []? Adjusted  2. Patient will demonstrate increased AROM to 5-120 to allow for proper joint functioning as indicated by patients Functional Deficits. []? Progressing: []? Met: []? Not Met: []? Adjusted  3. Patient will demonstrate an increase in Strength to 4+/5 to allow for proper functional mobility as indicated by patients Functional Deficits. []? Progressing: []? Met: []? Not Met: []? Adjusted  4. Patient will return to going up and down stairs reciprocally without increased symptoms or restriction. []? Progressing: []? Met: []? Not Met: []? Adjusted  5. Patient will be able to ambulate in community without AD demonstrating good gait pattern. []? Progressing: []? Met: []? Not Met: []? Adjusted         ASSESSMENT:  See eval    Treatment/Activity Tolerance:  [x] Patient tolerated treatment well [] Patient limited by fatique  [] Patient limited by pain  [] Patient limited by other medical complications  [] Other:     Overall Progression Towards Functional goals/ Treatment Progress Update:  [] Patient is progressing as expected towards functional goals listed. [] Progression is slowed due to complexities/Impairments listed. [] Progression has been slowed due to co-morbidities.   [x] Plan just implemented, too soon to assess goals progression <30days   [] Goals require adjustment due to lack of progress  [] Patient is not progressing as expected and requires additional follow up with physician  [] Other    Prognosis for POC: [x] Good [] Fair  [] Poor    Patient requires continued skilled intervention: [x] Yes  [] No      PLAN:   [] Continue per plan of care [] Alter current plan (see comments)  [x] Plan of care initiated [] Hold pending MD visit [] Discharge    Electronically signed by: Miriam Sol PT , OMT-C,  [id number]      Note: If patient does not return for scheduled/recommended follow up visits, this note will serve as a discharge from care along with the most recent update on progress.

## 2021-03-24 ENCOUNTER — HOSPITAL ENCOUNTER (OUTPATIENT)
Dept: PHYSICAL THERAPY | Age: 51
Setting detail: THERAPIES SERIES
Discharge: HOME | End: 2021-03-24
Payer: COMMERCIAL

## 2021-03-24 ENCOUNTER — TELEPHONE (OUTPATIENT)
Dept: ORTHOPEDIC SURGERY | Age: 51
End: 2021-03-24

## 2021-03-24 DIAGNOSIS — Z96.651 HISTORY OF TOTAL KNEE ARTHROPLASTY, RIGHT: ICD-10-CM

## 2021-03-24 PROCEDURE — 97110 THERAPEUTIC EXERCISES: CPT

## 2021-03-24 PROCEDURE — 97530 THERAPEUTIC ACTIVITIES: CPT

## 2021-03-24 RX ORDER — OXYCODONE HYDROCHLORIDE 5 MG/1
5 TABLET ORAL EVERY 6 HOURS PRN
Qty: 28 TABLET | Refills: 0 | Status: SHIPPED | OUTPATIENT
Start: 2021-03-24 | End: 2021-03-31

## 2021-03-24 NOTE — FLOWSHEET NOTE
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)  · Treatment Diagnosis: decreased strength, mobility    Insurance/Certification information:  PT Insurance Information: [facility]  Physician Information:  Referring Practitioner: Quang Chavira MD  Plan of care signed (Y/N): sent for cosign 3/21 (received)    Date of Patient follow up with Physician: 3/25     Progress Report: []  Yes  [x]  No     Date Range for reporting period:  Beginning:  3/22/2021  Ending:      Progress report due (10 Rx/or 30 days whichever is less):     Recertification due (POC duration/ or 90 days whichever is less): 21    Visit # POC/Insurance Allowable Auth Needed    BOMN []Yes   [x]No     Latex Allergy:  [x]NO      []YES  Preferred Language for Healthcare:   [x]English       []Other:    Functional Scale:       Date assessed: at eval  Test: WOMAC  Score: 24/96= 25% disability    Pain level:  7/10     History of Injury: Patient states he underwent R TKR in 2013. Patient then started having issues with TKR in  which involved taking out the spacer and placing a new one. Patient developed an infection in knee after this procedure. In , patient underwent a few I&D. In 2020, implant was removed and spacer put. In 2021, new implant was placed. He underwent incision and drainage of a hematoma on 2021 and again on 2021. Still continues to take antibiotics. To have sutures removed this week. Patient has been receiving home PT which ended recently. Currently using a cane for ambulation. Goes up stairs one at a time but goes down reciprocally. SUBJECTIVE: 3/24: little sore after last session but not bad. OBJECTIVE:   Observation:   · Palpation: no TTP noted  · Functional Mobility/Transfers: performs all independently  · Posture: slightly flexed knee in standing  · Bandages/Dressings/Incisions: incision healing with sutures in place, no signs of infection  · Gait: (include devices/WB status) Patient ambulates with straight cane demonstrating decreased terminal knee extension at heel strike and midstance.    ·  Test measurements:    ROM:  Date           Knee Flexion       Knee Extension    Active Passive Active Passive    Eval 3/22 110 115 Lacking 5 degrees Lacking 5 degrees            Strength:  Date Hip flexion Hip abduction Quad Hamstring Ankle DF Ankle PF   Eval 3/22 3+/5 4/5 3-/5 3/5 4-/5 3/5                RESTRICTIONS/PRECAUTIONS: none    Exercises/Interventions:     Therapeutic Ex (59306)   Min: 30 Reps/Resistance Notes/CUES   Nu step L1 x5 mins    HS step stretch 3x20 sec    Knee flexion step stretch 3x20 sec    Gastroc Incline 3x20 sec    Mini squats 10x     Stand HR 10x    Tband TKE [place] 2x10 L          Seated FAQ 2# 2x10 L    Seated tband HS curls [place] 2x10 L         Long sit knee extension stretch 3x20 sec    SLR flexion 0# 1x10 R         Therapeutic Activity (82636) Min: 8     Step up fwd 6\" 10x R     Step downs 4\" 10x R    Tandem stance x1 min L/R     Air Ex: NBOS with EC Add               NMR re-education (30036)  Min:  CUES NEEDED   Quad eccentrics Add         Manual Intervention (00273) Min: 5     Knee PROM x5 mins Supine knee flexion and extension with PT overpressure        Gait Training   (34749) Min:0     In clinic with straight cane          Modalities  Min:     CP after exercises Pt declined, will do at home           Other Therapeutic Activities: Pt was educated on PT POC, Diagnosis, Prognosis, pathomechanics as well as frequency and duration of scheduling future physical therapy appointments. Time was also taken on this day to answer all patient questions and participation in PT. Reviewed appointment policy in detail with patient and patient verbalized understanding. Home Exercise Program: Reviewed current HEP with patient. Added standing knee extension and knee flexion stretch on step. Patient verbalized/demonstrated understanding. Therapeutic Exercise and NMR EXR  [x] (64459) Provided verbal/tactile cueing for activities related to strengthening, flexibility, endurance, ROM for improvements in LE, proximal hip, and core control with self care, mobility, lifting, ambulation.  [] (54358) Provided verbal/tactile cueing for activities related to improving balance, coordination, kinesthetic sense, posture, motor skill, proprioception  to assist with LE, proximal hip, and core control in self care, mobility, lifting, ambulation and eccentric single leg control. [facility] and Therapeutic Activities:    [] (53554 or 64899) Provided verbal/tactile cueing for activities related to improving balance, coordination, kinesthetic sense, posture, motor skill, proprioception and motor activation to allow for proper function of core, proximal hip and LE with self care and ADLs and functional mobility.    [x] (19141) Gait Re-education- Provided training and instruction to the patient for proper LE, core and proximal hip recruitment and positioning and eccentric body weight control with ambulation re-education including up and down stairs     Home Exercise Program:    [x] (63883) Reviewed/Progressed HEP activities related to strengthening, flexibility, endurance, ROM of core, proximal hip and LE for functional self-care, mobility, lifting and ambulation/stair navigation   [] (43535)Reviewed/Progressed HEP activities related to improving balance, coordination, kinesthetic sense, posture, motor skill, proprioception of core, proximal hip and LE for self care, mobility, lifting, and ambulation/stair navigation      Manual Treatments:  PROM / STM / Oscillations-Mobs:  G-I, II, III, IV (PA's, Inf., Post.)  [] (35454) Provided manual therapy to mobilize LE, proximal hip and/or LS spine soft tissue/joints for the purpose of modulating pain, promoting relaxation,  increasing ROM, reducing/eliminating soft tissue swelling/inflammation/restriction, improving soft tissue extensibility and allowing for proper ROM for normal function with self care, mobility, lifting and ambulation. Charges:  Timed Code Treatment Minutes: 43   Total Treatment Minutes: 43      [] EVAL (LOW) 23102 (typically 20 minutes face-to-face)  [] EVAL (MOD) 89380 (typically 30 minutes face-to-face)  [] EVAL (HIGH) 11650 (typically 45 minutes face-to-face)  [] RE-EVAL     [x] ZH(23409) x  2   [] Dry needle 1 or 2 Muscles (98817)  [] NMR (29783) x     [] Dry needle 3+ Muscles (32467)  [] Manual (90867) x     [] Ultrasound (21376) x  [x] TA (57369) x   1  [] Mech Traction (99921)  [] ES(attended) (04994)     [] ES (un) (83314):   [] Vasopump (46090)  [] Gait (33806)  [] Other:    GOALS:  Patient stated goal: \"Increase my strength\"  []? Progressing: []? Met: []? Not Met: []? Adjusted     Therapist goals for Patient:   Short Term Goals: To be achieved in: 2 weeks  1. Independent in HEP and progression per patient tolerance, in order to prevent re-injury. []? Progressing: []? Met: []? Not Met: []? Adjusted  2. Patient will have a decrease in pain by 40% to facilitate improvement in movement, function, and ADLs as indicated by Functional Deficits. []? Progressing: []? Met: []? Not Met: []? Adjusted     Long Term Goals: To be achieved in: at discharge  1.  Disability index score of 25% or less for the [facility] to assist with reaching prior level of function. []? Progressing: []? Met: []? Not Met: []? Adjusted  2. Patient will demonstrate increased AROM to 5-120 to allow for proper joint functioning as indicated by patients Functional Deficits. []? Progressing: []? Met: []? Not Met: []? Adjusted  3. Patient will demonstrate an increase in Strength to 4+/5 to allow for proper functional mobility as indicated by patients Functional Deficits. []? Progressing: []? Met: []? Not Met: []? Adjusted  4. Patient will return to going up and down stairs reciprocally without increased symptoms or restriction. []? Progressing: []? Met: []? Not Met: []? Adjusted  5. Patient will be able to ambulate in community without AD demonstrating good gait pattern. []? Progressing: []? Met: []? Not Met: []? Adjusted           ASSESSMENT:  Patient with good tolerance to PT session today. End range quad strength is limited. Patient's PROM is improving. Treatment/Activity Tolerance:  [x] Patient tolerated treatment well [] Patient limited by fatique  [] Patient limited by pain  [] Patient limited by other medical complications  [] Other:     Overall Progression Towards Functional goals/ Treatment Progress Update:  [] Patient is progressing as expected towards functional goals listed. [] Progression is slowed due to complexities/Impairments listed. [] Progression has been slowed due to co-morbidities.   [x] Plan just implemented, too soon to assess goals progression <30days   [] Goals require adjustment due to lack of progress  [] Patient is not progressing as expected and requires additional follow up with physician  [] Other    Prognosis for POC: [x] Good [] Fair  [] Poor    Patient requires continued skilled intervention: [x] Yes  [] No      PLAN:   [x] Continue per plan of care [] Alter current plan (see comments)  [] Plan of care initiated [] Hold pending MD visit [] Discharge    Electronically signed by: Deja Abrams , OMT-C,  [id number] Note: If patient does not return for scheduled/recommended follow up visits, this note will serve as a discharge from care along with the most recent update on progress.

## 2021-03-25 ENCOUNTER — OFFICE VISIT (OUTPATIENT)
Dept: ORTHOPEDIC SURGERY | Age: 51
End: 2021-03-25

## 2021-03-25 DIAGNOSIS — Z96.651 HISTORY OF TOTAL KNEE ARTHROPLASTY, RIGHT: Primary | ICD-10-CM

## 2021-03-25 PROCEDURE — 99024 POSTOP FOLLOW-UP VISIT: CPT | Performed by: ORTHOPAEDIC SURGERY

## 2021-03-25 NOTE — PROGRESS NOTES
[facility]  Office Visit    Chief Complaint: Follow-up s/p right total knee arthroplasty    HPI:  Randall Shipman is a 53 y.o. who is here in follow-up of revision right total knee arthroplasty performed on 2/16/2021. He underwent incision and drainage of a hematoma on February 26, 2021 and again on March 5, 2021. He has not had any drainage. Pain is controlled. He is doing well in outpatient physical therapy. 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  Chondromalacia of patellofemoral joint    Chronic pain syndrome    History of stroke 2014    Gastroesophageal reflux disease with esophagitis--on daily ppi    Intractable chronic migraine without aura and with status migrainosus    Iron deficiency anemia    B12 deficiency    Malignant neoplasm of left kidney (HCC) clear cell. 8/1/18 Dr Porter Leiva.  Failed total knee, right, initial encounter ([address]    Infection of total knee replacement ([address]    History of depression    History of alcoholism ([address]    Infection of prosthetic right knee joint ([address]    History of total knee arthroplasty, right    Hematoma of right knee region       ROS:  Constitutional: denies fever, chills, weight loss  MSK: denies pain in other joints, muscle aches  Neurological: denies numbness, tingling, weakness    Exam:  There were no vitals taken for this visit.     Appearance: sitting in exam room chair, appears to be in no acute distress, awake and alert  Resp: unlabored breathing on room air  Skin: warm, dry and intact with out erythema or significant increased temperature  Neuro: grossly intact both lower extremities. Intact sensation to light touch. Motor exam 4+ to 5/5 in all major motor groups. Right knee: Anterior midline incision is healing with nylon sutures in place. There is no erythema or drainage. Active knee range of motion 5 to 110 degrees. Knee is stable to varus valgus stress and full extension in the anterior posterior drawer at 90 degrees of flexion. Sensation is intact light touch. There is brisk capillary refill. Dorsalis pedis and posterior tibial pulses are intact. There is 5/5 muscle strength in all muscle groups. Imaging:  None performed today    Assessment:  S/p revision right total knee arthroplasty and status post I&D of hematoma    Plan:  He will continue activity as tolerated and weightbearing as tolerated. He will remain active in physical therapy. Suture was removed today. He continues to take antibiotics per ID. He is still on Percocet and will wean off of this as tolerated. Follow-up in 6 weeks with x-rays at that time or sooner if needed. This dictation was done with Dragon dictation and may contain mechanical errors related to translation.

## 2021-03-26 ENCOUNTER — APPOINTMENT (OUTPATIENT)
Dept: PHYSICAL THERAPY | Age: 51
End: 2021-03-26
Payer: COMMERCIAL

## 2021-03-26 ENCOUNTER — APPOINTMENT (OUTPATIENT)
Dept: GENERAL RADIOLOGY | Age: 51
End: 2021-03-26
Payer: COMMERCIAL

## 2021-03-26 ENCOUNTER — HOSPITAL ENCOUNTER (EMERGENCY)
Age: 51
Discharge: HOME | End: 2021-03-26
Payer: COMMERCIAL

## 2021-03-26 VITALS
RESPIRATION RATE: 18 BRPM | SYSTOLIC BLOOD PRESSURE: 126 MMHG | HEIGHT: 72 IN | WEIGHT: 209 LBS | HEART RATE: 86 BPM | DIASTOLIC BLOOD PRESSURE: 57 MMHG | OXYGEN SATURATION: 96 % | BODY MASS INDEX: 28.31 KG/M2 | TEMPERATURE: 98.8 F

## 2021-03-26 DIAGNOSIS — R53.83 FATIGUE, UNSPECIFIED TYPE: Primary | ICD-10-CM

## 2021-03-26 DIAGNOSIS — R68.83 CHILLS: ICD-10-CM

## 2021-03-26 LAB
A/G RATIO: 1.3 (ref 1.1–2.2)
ALBUMIN SERPL-MCNC: 3.3 G/DL (ref 3.4–5)
ALP BLD-CCNC: 163 U/L (ref 40–129)
ALT SERPL-CCNC: 7 U/L (ref 10–40)
ANION GAP SERPL CALCULATED.3IONS-SCNC: 10 MMOL/L (ref 3–16)
AST SERPL-CCNC: 13 U/L (ref 15–37)
BASOPHILS ABSOLUTE: 0.1 K/UL (ref 0–0.2)
BASOPHILS RELATIVE PERCENT: 1.9 %
BILIRUB SERPL-MCNC: 0.3 MG/DL (ref 0–1)
BILIRUBIN URINE: NEGATIVE
BLOOD, URINE: ABNORMAL
BUN BLDV-MCNC: 7 MG/DL (ref 7–20)
CALCIUM SERPL-MCNC: 9 MG/DL (ref 8.3–10.6)
CHLORIDE BLD-SCNC: 108 MMOL/L (ref 99–110)
CLARITY: ABNORMAL
CO2: 25 MMOL/L (ref 21–32)
COLOR: YELLOW
CREAT SERPL-MCNC: 1 MG/DL (ref 0.9–1.3)
EOSINOPHILS ABSOLUTE: 0 K/UL (ref 0–0.6)
EOSINOPHILS RELATIVE PERCENT: 0.3 %
EPITHELIAL CELLS, UA: 1 /HPF (ref 0–5)
GFR AFRICAN AMERICAN: >60
GFR NON-AFRICAN AMERICAN: >60
GLOBULIN: 2.6 G/DL
GLUCOSE BLD-MCNC: 99 MG/DL (ref 70–99)
GLUCOSE URINE: NEGATIVE MG/DL
HCT VFR BLD CALC: 32.6 % (ref 40.5–52.5)
HEMOGLOBIN: 10.6 G/DL (ref 13.5–17.5)
HYALINE CASTS: 2 /LPF (ref 0–8)
KETONES, URINE: NEGATIVE MG/DL
LACTIC ACID: 0.7 MMOL/L (ref 0.4–2)
LEUKOCYTE ESTERASE, URINE: NEGATIVE
LYMPHOCYTES ABSOLUTE: 1 K/UL (ref 1–5.1)
LYMPHOCYTES RELATIVE PERCENT: 20.5 %
MCH RBC QN AUTO: 28.4 PG (ref 26–34)
MCHC RBC AUTO-ENTMCNC: 32.5 G/DL (ref 31–36)
MCV RBC AUTO: 87.4 FL (ref 80–100)
MICROSCOPIC EXAMINATION: YES
MONOCYTES ABSOLUTE: 0.3 K/UL (ref 0–1.3)
MONOCYTES RELATIVE PERCENT: 6.2 %
NEUTROPHILS ABSOLUTE: 3.6 K/UL (ref 1.7–7.7)
NEUTROPHILS RELATIVE PERCENT: 71.1 %
NITRITE, URINE: NEGATIVE
PDW BLD-RTO: 17.5 % (ref 12.4–15.4)
PH UA: 7 (ref 5–8)
PLATELET # BLD: 413 K/UL (ref 135–450)
PMV BLD AUTO: 8 FL (ref 5–10.5)
POTASSIUM REFLEX MAGNESIUM: 3.9 MMOL/L (ref 3.5–5.1)
PROTEIN UA: NEGATIVE MG/DL
RBC # BLD: 3.73 M/UL (ref 4.2–5.9)
RBC UA: 8 /HPF (ref 0–4)
SARS-COV-2, NAAT: NOT DETECTED
SODIUM BLD-SCNC: 143 MMOL/L (ref 136–145)
SPECIFIC GRAVITY UA: 1.01 (ref 1–1.03)
TOTAL PROTEIN: 5.9 G/DL (ref 6.4–8.2)
TROPONIN: <0.01 NG/ML
URINE REFLEX TO CULTURE: ABNORMAL
URINE TYPE: ABNORMAL
UROBILINOGEN, URINE: 1 E.U./DL
WBC # BLD: 5.1 K/UL (ref 4–11)
WBC UA: 3 /HPF (ref 0–5)

## 2021-03-26 PROCEDURE — 71045 X-RAY EXAM CHEST 1 VIEW: CPT

## 2021-03-26 PROCEDURE — 2580000003 HC RX 258: Performed by: PHYSICIAN ASSISTANT

## 2021-03-26 PROCEDURE — 99283 EMERGENCY DEPT VISIT LOW MDM: CPT

## 2021-03-26 PROCEDURE — 87635 SARS-COV-2 COVID-19 AMP PRB: CPT

## 2021-03-26 PROCEDURE — 85025 COMPLETE CBC W/AUTO DIFF WBC: CPT

## 2021-03-26 PROCEDURE — 73560 X-RAY EXAM OF KNEE 1 OR 2: CPT

## 2021-03-26 PROCEDURE — 84484 ASSAY OF TROPONIN QUANT: CPT

## 2021-03-26 PROCEDURE — 80053 COMPREHEN METABOLIC PANEL: CPT

## 2021-03-26 PROCEDURE — 83605 ASSAY OF LACTIC ACID: CPT

## 2021-03-26 PROCEDURE — 81001 URINALYSIS AUTO W/SCOPE: CPT

## 2021-03-26 RX ORDER — 0.9 % SODIUM CHLORIDE 0.9 %
1000 INTRAVENOUS SOLUTION INTRAVENOUS ONCE
Status: COMPLETED | OUTPATIENT
Start: 2021-03-26 | End: 2021-03-26

## 2021-03-26 RX ADMIN — SODIUM CHLORIDE 1000 ML: 9 INJECTION, SOLUTION INTRAVENOUS at 18:51

## 2021-03-26 NOTE — ED PROVIDER NOTES
Positive for shortness of breath with physical exertion. Negative for cough, wheezing, stridor. Cardiovascular:  Negative for chest pain, palpitations, leg swelling. Gastrointestinal:  Negative for nausea, vomiting, abdominal pain, diarrhea, constipation, blood in stool. Genitourinary:  Negative for dysuria, hematuria, flank pain, groin pain. Musculoskeletal:  Negative for myalgias, arthralgias, neck pain or stiffness. Neurological:  Negative for dizziness, seizures, syncope, focal weakness, numbness, headache. Psychiatric/Behavioral:  Negative for suicidal ideas, hallucinations, confusion. Except as noted above the remainder of the review of systems was reviewed and negative.        PAST MEDICAL HISTORY         Diagnosis Date    Alcoholism [facility])     last drink 2015    Allergic migraine with status migrainosus     Allergic rhinitis, seasonal     Anemia     Arthritis     right knee    Vaughn's esophagus     Benign intracranial hypertension     Cancer (HCC)     renal     Carpal tunnel syndrome     Chronic pain     Depression     GERD (gastroesophageal reflux disease)     Headache(784.0)     History of blood transfusion     History of tobacco use     Quit 7/2014    Migraine     chronic for 1 year    Morbid obesity ([address]     Movement disorder     Onychomycosis     Sleep apnea, obstructive     Severe uses cpap stop bang 6    Unspecified cerebral artery occlusion with cerebral infarction 2014    slight weakness in left arm    Use of cane as ambulatory aid     Wears dentures     full set    Wears glasses        SURGICAL HISTORY           Procedure Laterality Date    ABDOMEN SURGERY      gastric bypass    ABDOMEN SURGERY  4-7-2016    repair of recurrent incisional hernia with mesh, removal of old mesh, bilateral component separation    ABDOMINAL EXPLORATION SURGERY  1/11/16    exp lap, lysis of adhesions, small bowel resection    CARPAL TUNNEL RELEASE      bilat    DILATATION, ESOPHAGUS      ENDOSCOPY, COLON, DIAGNOSTIC      EYE SURGERY      GASTRIC BYPASS SURGERY  Jan 2009    Has lost about 200 pounds.  HERNIA REPAIR  3-    ventral    JOINT REPLACEMENT      KIDNEY REMOVAL Left 08/01/2018    KNEE ARTHROSCOPY Right 7/11/2013    Dr.Robert WaltersAdelina     KNEE ARTHROSCOPY Right 7/11/12    RIGHT KNEE ARTHROSCOPY WITH CHONDROPLASTY    KNEE SURGERY  July 2012    right, arthroscopy    KNEE SURGERY Right 2/18/2020    INCISION AND DRAINAGE RIGHT KNEE AND WOULD VAC PLACEMENT performed by Joanne Leyva MD at [place] [address] Right 2/26/2021    INCISION AND DRAINAGE OF RIGHT KNEE HEMATOMA performed by Donte Kilgore MD at [facility] Right 3/5/2021    INCISION AND DRAINAGE AND CLOSURE RIGHT TOTAL KNEE performed by Donte Kilgore MD at [address]  2005    OTHER SURGICAL HISTORY Left 03/08/2016    CT biopsy ablasion left kidney    OTHER SURGICAL HISTORY  2016    small intestine 12 inch removed, 2 hernia surgeries, another surgery to drain infection from incision.      REVISION TOTAL KNEE ARTHROPLASTY Right 12/17/2019    RIGHT REVISION TIBIA TOTAL KNEE REPLACEMENT performed by Joanne Leyva MD at [address] Right 1/22/2020    RIGHT KNEE IRRIGATION AND DEBRIDEMENT WITH POLY EXCHANGE performed by Valentine Vidal MD at [address] Right 2/16/2021    RIGHT REMOVAL OF EXPLANT AND TOTAL KNEE REPLACEMENT [facility] [facility]  10/15/[facility]    RIGHT    TOTAL KNEE ARTHROPLASTY Right 8/12/2020    RIGHT ARTHROPLASY  RESECTION WITH INSERTION OF SPACER performed by Joanne Leyva MD at [place] [id number]  6-7-2016    UPPER GASTROINTESTINAL ENDOSCOPY  04/02/2018       CURRENT MEDICATIONS       Previous Medications    ASPIRIN 81 MG EC TABLET    Take 1 tablet by mouth daily for 14 days Take twice a day for 14 days after knee surgery then resume daily dosing. ATORVASTATIN (LIPITOR) 40 MG TABLET    Take 1 tablet by mouth nightly At bedtime    CALCIUM-VITAMIN D (OSCAL 500/200 D-3) 500-200 MG-UNIT PER TABLET    Take 1 tablet by mouth 2 times daily     CYCLOBENZAPRINE (FLEXERIL) 10 MG TABLET    Take 1 tablet by mouth 3 times daily as needed for Muscle spasms    DOXYCYCLINE HYCLATE (VIBRAMYCIN) 100 MG CAPSULE    Take 1 capsule by mouth 2 times daily    FLUOXETINE (PROZAC) 20 MG CAPSULE    TAKE 1 CAPSULE BY MOUTH DAILY    MULTIPLE VITAMIN TABS    Take 1 tablet by mouth daily     ONDANSETRON (ZOFRAN-ODT) 4 MG DISINTEGRATING TABLET    Take 4 mg by mouth every 8 hours as needed    OXYCODONE (ROXICODONE) 5 MG IMMEDIATE RELEASE TABLET    Take 1 tablet by mouth every 6 hours as needed for Pain for up to 7 days. PANTOPRAZOLE (PROTONIX) 40 MG TABLET    Take 1 tablet by mouth 2 times daily    SILDENAFIL (REVATIO) 20 MG TABLET    Take 4 tablets by mouth as needed (30 min prior to intercourse)    TRAZODONE (DESYREL) 100 MG TABLET    Take 2 tablets by mouth nightly    ZOLPIDEM (AMBIEN) 10 MG TABLET    Take 1 tablet by mouth nightly as needed (insomnia) for up to 90 days. ALLERGIES     Nsaids, Morphine, Prochlorperazine, Valtrex [valacyclovir hcl], Morphine and related, and Tolmetin    FAMILY HISTORY           Problem Relation Age of Onset    Cancer Father         Lymphoma    Arthritis Mother     Heart Disease Maternal Uncle     Heart Disease Maternal Uncle     Diabetes Maternal Uncle      Family Status   Relation Name Status    Father   at age 61        non-hodgkin's lymphoma    Mother  Alive    [facility]      MGM      MGF      [facility]      PGF      [facility]     [address]      reports that he quit smoking about 3 years ago. His smoking use included cigarettes. He has a 12.50 pack-year smoking history.  He has never used smokeless tobacco. He reports that he does not drink alcohol or use drugs. PHYSICAL EXAM    (up to 7 for level 4, 8 or more for level 5)     ED Triage Vitals   BP Temp Temp Source Pulse Resp SpO2 Height Weight   03/26/21 1621 03/26/21 1619 03/26/21 1619 03/26/21 1621 03/26/21 1621 03/26/21 1621 03/26/21 1619 03/26/21 1619   122/80 98.8 °F (37.1 °C) Temporal 84 18 99 % 6' (1.829 m) 208 lb 15.9 oz (94.8 kg)       Constitutional:  Appearing well-developed and well-nourished. No distress. HENT:  Normocephalic and atraumatic. Conjunctivae and EOM normal. PERRLA. Neck:  Normal range of motion. No tracheal deviation. Cardiovascular:  Normal rate, regular rhythm, normal heart sounds, intact distal pulses. Pulmonary/Chest:  Effort and breath sounds normal. No respiratory distress, wheezes or rales. Abdominal:  Soft. Bowel sounds normal. No tenderness, distension, mass, rebound or guarding. Musculoskeletal: Well-healing surgical scar noted to the anterior right knee, with slight warmth and edema throughout the knee but no erythema, and with good range of motion. Sensation to light touch grossly intact and capillary refill <3 seconds in the right lower extremity. Neurological:  Alert and oriented to person, place, and time. No cranial nerve deficit observed. Skin:  Skin is warm and dry. Not diaphoretic. Psychiatric:  Normal mood, affect, behavior, judgment, thought content. DIAGNOSTIC RESULTS     RADIOLOGY:     Interpretation per the Radiologist below, if available at the time of this note:    XR KNEE RIGHT (1-2 VIEWS)   Final Result   Postsurgical changes of cemented total knee arthroplasty with patellar   resurfacing. Alignment and appearance of the arthroplasty hardware is   similar to the prior study. No acute fracture or osseous destruction. Soft tissue swelling about the knee could relate to underlying cellulitis.          XR CHEST PORTABLE   Final Result   No evidence of pneumonia LABS:  Labs Reviewed   CBC WITH AUTO DIFFERENTIAL - Abnormal; Notable for the following components:       Result Value    RBC 3.73 (*)     Hemoglobin 10.6 (*)     Hematocrit 32.6 (*)     RDW 17.5 (*)     All other components within normal limits    Narrative:     Performed at:  [facility]  [address], [place]   Phone [phone]   COMPREHENSIVE METABOLIC PANEL W/ REFLEX TO MG FOR LOW K - Abnormal; Notable for the following components:     Total Protein 5.9 (*)     Albumin 3.3 (*)     Alkaline Phosphatase 163 (*)     ALT 7 (*)     AST 13 (*)     All other components within normal limits    Narrative:     Performed at:  [facility]  [address]   Phone [phone]   URINE RT REFLEX TO CULTURE - Abnormal; Notable for the following components:    Clarity, UA CLOUDY (*)     Blood, Urine SMALL (*)     All other components within normal limits    Narrative:     Performed at:  [facility]  [address], [place]   Phone [phone]   MICROSCOPIC URINALYSIS - Abnormal; Notable for the following components:    RBC, UA 8 (*)     All other components within normal limits    Narrative:     Performed at:  [facility]  [address], [place] [address]   Phone [phone], RAPID    Narrative:     Performed at:  [facility]  [address], [place] [address]   Phone [phone]   TROPONIN    Narrative:     Performed at:  [facility]  [address], [place] [address]   Phone [phone]   LACTIC ACID, PLASMA    Narrative:     Performed at:  [address] [place] [address]   Phone [phone]       All other labs were within normal range or not returned as of this dictation. EMERGENCY DEPARTMENT COURSE and DIFFERENTIAL DIAGNOSIS/MDM:   Vitals:    Vitals:    03/26/21 1619 03/26/21 1621 03/26/21 1850 03/26/21 1946   BP:  122/80 129/77 (!) 126/57   Pulse:  84 86    Resp:  18     Temp: 98.8 °F (37.1 °C)      TempSrc: Temporal      SpO2:  99% 95% 96%   Weight: 208 lb 15.9 oz (94.8 kg)      Height: 6' (1.829 m)          The patient's condition in the ED was good, the patient was afebrile and nontoxic in appearance, and the patient's physical exam was unremarkable. Normal vital signs. No signs of respiratory distress. X-ray of the knee showed some soft tissue swelling but no other acute findings, and the surgical wound looks good. Patient's ED work-up was all very reassuring, no concerning abnormalities. COVID-19 test was negative. I consulted the orthopedic physician on-call, Dr. Juany Cottrell, to inform him of the patient's condition, given his recent multiple knee procedures, and dyspnea had no further concerns. The patient had just had a follow-up visit yesterday with his orthopedic surgeon, and he is on doxycycline therapy currently. There was no indication for hospitalization or further workup. Patient says he has appoint with cardiology coming up next week. He will be discharged with instructions to keep his cardiology appointment, follow-up with family practice as needed. The patient verbalized understanding and agreement with this plan of care. The patient was advised to return to the emergency department if symptoms should significantly worsen or if new and concerning symptoms should appear. I estimate there is LOW risk for PULMONARY EMBOLISM, ACUTE CORONARY SYNDROME, THORACIC AORTIC DISSECTION, STROKE, TRANSIENT ISCHEMIC ATTACK, HEMORRHAGE, OR CARDIAC ARRHYTHMIA, thus I consider the discharge disposition reasonable. PROCEDURES:  None    FINAL IMPRESSION      1. Fatigue, unspecified type    2.  Chills          DISPOSITION/PLAN   DISPOSITION PATIENT REFERRED TO:  Gamaliel Baker MD  [address] [facility]  [address] [place] [address]  [phone]    Call   As needed, For follow-up care    your cardiologist    Go to   keep your upcoming appointment      DISCHARGE MEDICATIONS:  New Prescriptions    No medications on file       (Please note that portions of this note were completed with a voice recognition program.  Efforts were made to edit the dictations but occasionally words are mis-transcribed.)    Sayra Paulino, [address] [place] [address],[address], [address] [place] [address] Chanelle  03/26/21 4220

## 2021-03-26 NOTE — PROGRESS NOTES
[facility]      Cardiology Consult    Samuel Dunn  1970 March 29, 2021    Referring Physician: Karen Reis MD  Reason for Referral: TIA    CC: \"Stroke symptoms. \"     HPI:  The patient is 53 y.o. male with a past medical history significant for nicotine addiction and CVA who presents today for evaluation of a presumed TIA. He was seen by his primary care physician 3/10/21 after an episode of left hand weakness. His symptoms resolved and denied any recurrence. An outpatient MRI and carotid doppler were ordered along with a referral to cardiology. He was evaluated in the ED 3/28/21 with leg pain and was found to have a DVT and started on Xarelto. He reports multiple knee surgeries in the past year. His mobility is limited and utilizes a cane with ambulation. He is accompanied by his mother. He follows with [facility] for management of anemia and history of renal cell carcinoma. He states there was no known cause of the CVA in 2014 and denies any recurrent symptoms until the recent TIA symptoms. His echocardiogram in 2018 showed a positive bubble study but does not appear he has had any additional cardiac testing or evaluated by cardiology. He states he is scheduled for the MRI and carotids dopplers 4/1/21. He notes positional lightheadedness but denies any recent falls or syncopal episodes. Patient denies exertional chest pain/pressure, dyspnea at rest, worsening SALAZAR, PND, orthopnea, palpitations, weight changes, and changes in LE edema. He reports compliance with his medications and tolerating. He denies any abnormal bleeding or bruising.      Past Medical History:   Diagnosis Date    Alcoholism [facility])     last drink 2015    Allergic migraine with status migrainosus     Allergic rhinitis, seasonal     Anemia     Arthritis     right knee    Vaughn's esophagus     Benign intracranial hypertension     Cancer (HCC)     renal     Carpal tunnel syndrome     Chronic pain     Depression  GERD (gastroesophageal reflux disease)     Headache(784.0)     History of blood transfusion     History of tobacco use     Quit 7/2014    Migraine     chronic for 1 year    Morbid obesity ([address]     Movement disorder     Onychomycosis     Sleep apnea, obstructive     Severe uses cpap stop bang 6    Unspecified cerebral artery occlusion with cerebral infarction 2014    slight weakness in left arm    Use of cane as ambulatory aid     Wears dentures     full set    Wears glasses      Past Surgical History:   Procedure Laterality Date    ABDOMEN SURGERY      gastric bypass    ABDOMEN SURGERY  4-7-2016    repair of recurrent incisional hernia with mesh, removal of old mesh, bilateral component separation    ABDOMINAL EXPLORATION SURGERY  1/11/16    exp lap, lysis of adhesions, small bowel resection    CARPAL TUNNEL RELEASE      bilat    DILATATION, ESOPHAGUS      ENDOSCOPY, COLON, DIAGNOSTIC      EYE SURGERY      GASTRIC BYPASS SURGERY  Jan 2009    Has lost about 200 pounds.  HERNIA REPAIR  3-    ventral    JOINT REPLACEMENT      KIDNEY REMOVAL Left 08/01/2018    [facility]     KNEE ARTHROSCOPY Right 7/11/12    RIGHT KNEE ARTHROSCOPY WITH CHONDROPLASTY    KNEE SURGERY  July 2012    right, arthroscopy    KNEE SURGERY Right 2/18/2020    INCISION AND DRAINAGE RIGHT KNEE AND WOULD VAC PLACEMENT performed by Joanne Leyva MD at . [facility] Right 2/26/2021    INCISION AND DRAINAGE OF RIGHT KNEE HEMATOMA performed by Donte Kilgore MD at . [facility] Right 3/5/2021    INCISION AND DRAINAGE AND CLOSURE RIGHT TOTAL KNEE performed by Donte Kilgore MD at [address] [place] [address]  2005    OTHER SURGICAL HISTORY Left 03/08/2016    CT biopsy ablasion left kidney    OTHER SURGICAL HISTORY  2016    small intestine 12 inch removed, 2 hernia surgeries, another surgery to drain infection from incision.      REVISION TOTAL KNEE ARTHROPLASTY Right 12/17/2019    RIGHT REVISION TIBIA TOTAL KNEE REPLACEMENT performed by Maynor Morales MD at [address] Right 1/22/2020    RIGHT KNEE IRRIGATION AND DEBRIDEMENT WITH POLY EXCHANGE performed by Akash Mcwilliams MD at [address] Right 2/16/2021    RIGHT REMOVAL OF EXPLANT AND TOTAL KNEE REPLACEMENT [facility]  10/15[facility]    RIGHT    TOTAL KNEE ARTHROPLASTY Right 8/12/2020    RIGHT ARTHROPLASY  RESECTION WITH INSERTION OF SPACER performed by Maynor Morales MD at [facility]  6-7-2016    UPPER GASTROINTESTINAL ENDOSCOPY  04/02/2018     Family History   Problem Relation Age of Onset    Cancer Father         Lymphoma    Arthritis Mother     Heart Disease Maternal Uncle     Heart Disease Maternal Uncle     Diabetes Maternal Uncle      Social History     Tobacco Use    Smoking status: Current Every Day Smoker     Packs/day: 0.50     Years: 25.00     Pack years: 12.50     Types: Cigarettes    Smokeless tobacco: Never Used   Substance Use Topics    Alcohol use: No     Alcohol/week: 0.0 standard drinks     Comment: last drink 2015    Drug use: Yes     Types: Marijuana     Comment: medical marjiuana card       Allergies   Allergen Reactions    Nsaids Nausea Only and Other (See Comments)     Hx of Barretts esophagus      Morphine Hives and Itching     Pt gets Hives/itching.  Does not tolerate     Prochlorperazine Other (See Comments)     No allergic reaction, patient reported sense of \"restlessness\" and fidgiting    Valtrex [Valacyclovir Hcl] Diarrhea    Morphine And Related Hives and Itching    Tolmetin Nausea Only     Hx of Barretts esophagus     Current Outpatient Medications   Medication Sig Dispense Refill    rivaroxaban (XARELTO STARTER PACK) 15 & 20 MG Starter Pack Take by mouth      oxyCODONE (ROXICODONE) 5 MG immediate release tablet Take 1 tablet by mouth every 6 hours as needed for Pain for up to 7 days. 28 tablet 0    atorvastatin (LIPITOR) 40 MG tablet Take 1 tablet by mouth nightly At bedtime 90 tablet 1    zolpidem (AMBIEN) 10 MG tablet Take 1 tablet by mouth nightly as needed (insomnia) for up to 90 days. 90 tablet 1    traZODone (DESYREL) 100 MG tablet Take 2 tablets by mouth nightly 180 tablet 0    doxycycline hyclate (VIBRAMYCIN) 100 MG capsule Take 1 capsule by mouth 2 times daily 60 capsule 0    ondansetron (ZOFRAN-ODT) 4 MG disintegrating tablet Take 4 mg by mouth every 8 hours as needed      sildenafil (REVATIO) 20 MG tablet Take 4 tablets by mouth as needed (30 min prior to intercourse) 30 tablet 0    FLUoxetine (PROZAC) 20 MG capsule TAKE 1 CAPSULE BY MOUTH DAILY 90 capsule 1    pantoprazole (PROTONIX) 40 MG tablet Take 1 tablet by mouth 2 times daily      calcium-vitamin D (OSCAL 500/200 D-3) 500-200 MG-UNIT per tablet Take 1 tablet by mouth 2 times daily       Multiple Vitamin TABS Take 1 tablet by mouth daily        No current facility-administered medications for this visit. Review of Systems:  · Constitutional: No unanticipated weight loss. There's been no change in energy level, sleep pattern, or activity level. No fevers, chills. · Eyes: No visual changes or diplopia. No scleral icterus. · ENT: No Headaches, hearing loss or vertigo. No mouth sores or sore throat. · Cardiovascular: as reviewed in HPI  · Respiratory: No cough or wheezing, no sputum production. No hemoptysis. · Gastrointestinal: No abdominal pain, appetite loss, blood in stools. No change in bowel or bladder habits. · Genitourinary: No dysuria, trouble voiding, or hematuria. · Musculoskeletal:  No gait disturbance, no joint complaints. · Integumentary: No rash or pruritis. · Neurological: No headache, diplopia, change in muscle strength, numbness or tingling.    · Psychiatric: No anxiety or depression. · Endocrine: No temperature intolerance. No excessive thirst, fluid intake, or urination. No tremor. · Hematologic/Lymphatic: No abnormal bruising or bleeding, blood clots or swollen lymph nodes. · Allergic/Immunologic: No nasal congestion or hives. Physical Exam:   /60   Pulse 78   Temp 97.1 °F (36.2 °C)   Ht 6' (1.829 m)   Wt 213 lb (96.6 kg) Comment: with shoes  SpO2 97%   BMI 28.89 kg/m²   Wt Readings from Last 3 Encounters:   03/29/21 213 lb (96.6 kg)   03/26/21 208 lb 15.9 oz (94.8 kg)   03/15/21 210 lb (95.3 kg)     Constitutional: He is oriented to person, place, and time. He appears older than stated age. In no acute distress. Using a cane  Head: Normocephalic and atraumatic. Pupils equal and round. Neck: Neck supple. No JVP or carotid bruit appreciated. No mass and no thyromegaly present. No lymphadenopathy present. Cardiovascular: Normal rate. Normal heart sounds. Exam reveals no gallop and no friction rub. No murmur heard. Pulmonary/Chest: Effort normal and breath sounds normal. No respiratory distress. He has no wheezes, rhonchi or rales. Abdominal: Soft, non-tender. Bowel sounds are normal. He exhibits no organomegaly, mass or bruit. Extremities: No edema. No cyanosis or clubbing. Pulses are 2+ radial/carotid bilaterally. Neurological: No gross cranial nerve deficit. Coordination normal.   Skin: Skin is warm and dry. There is no rash or diaphoresis. Psychiatric: He has a normal mood and affect. His speech is normal and behavior is normal.     Lab Review:   FLP:    Lab Results   Component Value Date    TRIG 103 02/02/2021    HDL 52 02/02/2021    LDLCALC 33 02/02/2021    LABVLDL 21 02/02/2021     BUN/Creatinine:    Lab Results   Component Value Date    BUN 7 03/26/2021    CREATININE 1.0 03/26/2021     EKG Interpretation: 2/11/21 Marked sinus bradycardia.      Image Review:   Echo 11/24/18   Normal left ventricle size, wall thickness, and systolic function with an estimated ejection fraction of 55-60%. No regional wall motion abnormalities are seen. The right ventricle is normal in size and function. Trivial mitral and tricuspid regurgitation. A bubble study was performed and shows evidence of right to left shunting consistent with a patent foramen ovale or atrial septal defect. CT abdomen 1/31/21   Mild atherosclerotic calcifications    CTA pulmonary artery 3/28/2021  Mild coronary artery calcification    Assessment/Plan:   1) TIA/history of CVA. Will arrange for a repeat echocardiogram with bubble and event monitor to assess for any arrhythmias. Continue Lipitor. Complete MRI and carotids as scheduled. 2) Presumed PFO. Echocardiogram from 2018 showed evidence of right to left shunting. Will repeat the echocardiogram and refer to interventional cardiology with Dr. Trent Rossi to consider closing the PFO if no source of CVA found. 3) Orthostatic lightheadedness. Blood pressure controlled today. Also on several medications which could cause lightheadedness. 4) DVT. Assume related to his knee surgery. Started on Xarelto. Encourage to follow up with Dr. Anna Rowe as he also has a history of renal cell carcinoma. 5) Nicotine Addiction. Encouraged smoking cessation. 6) Coronary artery calcifications. Noted on CT. Continue statin. Follow up with Dr. Lindsey Oates in 6-8 weeks. Thank you very much for allowing me to participate in the care of your patient. Please do not hesitate to contact me if you have any questions. Sincerely,  Parminder Turcios. Bulmaro Staton, [address] [place] [address], [address] [place] [address]  Ph: [phone]  Fax: [phone]    This note was scribed in the presence of Dr Bulmaro Staton MD by Prakash Nolasco RN. Physician Attestation: The scribes documentation has been prepared under my direction and personally reviewed by me in its entirety.  I confirm that the note above accurately reflects all work, treatment, procedures, and medical decision making performed by me. All portions of the note including but not limited to the chief complaint, history of present illness, physical exam, assessment and plan/medical decision making were personally reviewed, edited, and updated on the day of the visit.

## 2021-03-27 ENCOUNTER — NURSE TRIAGE (OUTPATIENT)
Dept: OTHER | Facility: CLINIC | Age: 51
End: 2021-03-27

## 2021-03-27 NOTE — TELEPHONE ENCOUNTER
Received call from Emma Watkins at [facility] with [employer] Complaint. Brief description of triage: ER yesterday for fatigue and lethargy, chills, body aches, Covid negative; recent knee replacement surgery three weeks ago; shortness of breath with exertion    Triage indicates for patient to go to ED now. Care advice provided, patient verbalizes understanding; denies any other questions or concerns; instructed to call back for any new or worsening symptoms. Attention Provider: Thank you for allowing me to participate in the care of your patient. The patient was connected to triage in response to information provided to the [facility]. Please do not respond through this encounter as the response is not directed to a shared pool. Reason for Disposition   Patient sounds very sick or weak to the triager    Answer Assessment - Initial Assessment Questions  1. DESCRIPTION: \"Describe how you are feeling. \"      \"just really tired\"    2. SEVERITY: \"How bad is it? \"  \"Can you stand and walk? \"    - MILD - Feels weak or tired, but does not interfere with work, school or normal activities    - [facility] to stand and walk; weakness interferes with work, school, or normal activities    - SEVERE - Unable to stand or walk      Moderate    3. ONSET:  \"When did the weakness begin? \"      Yesterday     4. CAUSE: \"What do you think is causing the weakness? \"      Not sure    5. MEDICINES: Andrés Ek you recently started a new medicine or had a change in the amount of a medicine? \"      No    6. OTHER SYMPTOMS: \"Do you have any other symptoms? \" (e.g., chest pain, fever, cough, SOB, vomiting, diarrhea, bleeding, other areas of pain)      Lethargy, chills, body aches, recent knee replacement, shortness of breath with exertion; seen in ER yesterday, Covid negative; was told to follow up on Monday; TIA a few weeks ago    7. PREGNANCY: \"Is there any chance you are pregnant? \" \"When was your last menstrual period? \" N/A    Protocols used: WEAKNESS (GENERALIZED) AND FATIGUE-ADULT-AH

## 2021-03-27 NOTE — ED NOTES
D/C: Order noted for d/c. Pt confirmed d/c paperwork have correct name. Discharge and education instructions reviewed with patient. Teach-back successful. Pt verbalized understanding and signed d/c papers. Pt denied questions at this time. No acute distress noted. Patient instructed to follow-up as noted - return to emergency department if symptoms worsen. Patient verbalized understanding. Discharged per EDMD with discharge instructions. Pt discharged and ambulated to private vehicle. Patient stable upon departure. Thanked patient for choosing [facility] for care.             Breanna Chin RN  03/26/21 2050

## 2021-03-29 ENCOUNTER — HOSPITAL ENCOUNTER (OUTPATIENT)
Dept: PHYSICAL THERAPY | Age: 51
Setting detail: THERAPIES SERIES
Discharge: HOME | End: 2021-03-29
Payer: COMMERCIAL

## 2021-03-29 ENCOUNTER — TELEPHONE (OUTPATIENT)
Dept: CARDIOLOGY CLINIC | Age: 51
End: 2021-03-29

## 2021-03-29 ENCOUNTER — OFFICE VISIT (OUTPATIENT)
Dept: CARDIOLOGY CLINIC | Age: 51
End: 2021-03-29
Payer: COMMERCIAL

## 2021-03-29 ENCOUNTER — TELEPHONE (OUTPATIENT)
Dept: FAMILY MEDICINE CLINIC | Age: 51
End: 2021-03-29

## 2021-03-29 VITALS
DIASTOLIC BLOOD PRESSURE: 60 MMHG | TEMPERATURE: 97.1 F | OXYGEN SATURATION: 97 % | HEART RATE: 78 BPM | HEIGHT: 72 IN | WEIGHT: 213 LBS | BODY MASS INDEX: 28.85 KG/M2 | SYSTOLIC BLOOD PRESSURE: 118 MMHG

## 2021-03-29 DIAGNOSIS — Z86.73 HISTORY OF CVA (CEREBROVASCULAR ACCIDENT): ICD-10-CM

## 2021-03-29 DIAGNOSIS — F17.210 CIGARETTE NICOTINE DEPENDENCE WITHOUT COMPLICATION: ICD-10-CM

## 2021-03-29 DIAGNOSIS — I25.84 CORONARY ARTERY CALCIFICATION: ICD-10-CM

## 2021-03-29 DIAGNOSIS — Z86.718 HISTORY OF DVT (DEEP VEIN THROMBOSIS): ICD-10-CM

## 2021-03-29 DIAGNOSIS — I25.10 CORONARY ARTERY CALCIFICATION: ICD-10-CM

## 2021-03-29 DIAGNOSIS — I28.0 RIGHT TO LEFT CARDIAC SHUNT (HCC): ICD-10-CM

## 2021-03-29 DIAGNOSIS — G45.9 TIA (TRANSIENT ISCHEMIC ATTACK): Primary | ICD-10-CM

## 2021-03-29 PROCEDURE — 99204 OFFICE O/P NEW MOD 45 MIN: CPT | Performed by: INTERNAL MEDICINE

## 2021-03-29 RX ORDER — RIVAROXABAN 15 MG-20MG
KIT ORAL
COMMUNITY
End: 2021-05-13 | Stop reason: CLARIF

## 2021-03-29 NOTE — FLOWSHEET NOTE
[place] [facility], [facility]  [address] [facility] [place], [facility]  Phone: [phone]   Fax:     [phone]    Physical Therapy  Cancellation/No-show Note  Patient Name:  Emmanuel Chi  :  1970   Date:  3/29/2021  Cancelled visits to date: 1  No-shows to date: 0    Patient status for today's appointment patient:  [x]  Cancelled  []  Rescheduled appointment  []  No-show     Reason given by patient:  [x]  Patient ill  []  Conflicting appointment  []  No transportation    []  Conflict with work  []  No reason given  [x]  Other:     Comments:    In hospital with blood clots    Phone call information:   []  Phone call made today to patient at _ time at number provided:      []  Patient answered, conversation as follows:    []  Patient did not answer, message left as follows:  []  Phone call not made today    Electronically signed by:  Alvaro Whitman, PTA  [id number]

## 2021-03-29 NOTE — TELEPHONE ENCOUNTER
Gave patient 30 day event monitor kit in office today. Explained contents, application, process and return to patient and his Mom. Patient verbalized and confirmed understanding. Enrolled patient online thru [address] [employer] [place] today.

## 2021-03-29 NOTE — PATIENT INSTRUCTIONS
Patient Education        Transient Ischemic Attack: Care Instructions  Overview     A transient ischemic attack (TIA) is when blood flow to a part of your brain is blocked for a short time. A TIA is like a stroke but usually lasts only a few minutes. A TIA does not cause lasting brain damage. Any vision problems, slurred speech, or other symptoms usually go away in 10 to 20 minutes. But they may last for up to 24 hours. TIAs are often warning signs of a stroke. Some people who have a TIA may have a stroke in the future. A stroke can cause symptoms like those of a TIA. But a stroke causes lasting damage to your brain. You can take steps to help prevent a stroke. One thing you can do is get early treatment. If you have other new symptoms, or if your symptoms do not get better, go back to the emergency room or call your doctor right away. Getting treatment right away may prevent long-term brain damage caused by a stroke. Follow-up care is a key part of your treatment and safety. Be sure to make and go to all appointments, and call your doctor if you are having problems. It's also a good idea to know your test results and keep a list of the medicines you take. How can you care for yourself at home? Medicines    · Be safe with medicines. Take your medicines exactly as prescribed. Call your doctor if you think you are having a problem with your medicine.     · If you take a blood thinner, such as aspirin, be sure you get instructions about how to take your medicine safely. Blood thinners can cause serious bleeding problems.     · Call your doctor if you are not able to take your medicines for any reason.     · Do not take any over-the-counter medicines or herbal products without talking to your doctor first.     · If you take birth control pills or hormone therapy, talk to your doctor. Ask if these treatments are right for you. Lifestyle changes    · Do not smoke.  If you need help quitting, talk to your doctor about stop-smoking programs and medicines.     · Be active. If your doctor recommends it, get more exercise. Walking is a good choice. Bit by bit, increase the amount you walk every day. Try for at least 30 minutes on most days of the week. You also may want to swim, bike, or do other activities.     · Eat heart-healthy foods. These include fruits, vegetables, high-fiber foods, lean meats, beans, peas, nuts, seeds, and soy products, and foods that are low in sodium, saturated fat, and trans fat.     · Stay at a healthy weight. Lose weight if you need to.     · Limit alcohol to 2 drinks a day for men and 1 drink a day for women. Staying healthy    · Manage other health problems such as diabetes, high blood pressure, and high cholesterol.     · Get the flu vaccine every year. When should you call for help? Call 911 anytime you think you may need emergency care. For example, call if:    · You have new or worse symptoms of a stroke. These may include:  ? Sudden numbness, tingling, weakness, or loss of movement in your face, arm, or leg, especially on only one side of your body. ? Sudden vision changes. ? Sudden trouble speaking. ? Sudden confusion or trouble understanding simple statements. ? Sudden problems with walking or balance. ? A sudden, severe headache that is different from past headaches. Call 911 even if these symptoms go away in a few minutes.     · You feel like you are having another TIA. Watch closely for changes in your health, and be sure to contact your doctor if you have any problems. Where can you learn more? Go to https://[employer]. org and sign in to your [employer] account. Enter [phone] in the [facility] box to learn more about \"Transient Ischemic Attack: Care Instructions. \"     If you do not have an account, please click on the \"Sign Up Now\" link. Current as of: March 4, 2020               Content Version: 12.8  © 6907-7473 Healthwise, Incorporated. Care instructions adapted under license by [facility] ([facility]). If you have questions about a medical condition or this instruction, always ask your healthcare professional. [address] any warranty or liability for your use of this information.

## 2021-03-31 ENCOUNTER — HOSPITAL ENCOUNTER (OUTPATIENT)
Dept: PHYSICAL THERAPY | Age: 51
Setting detail: THERAPIES SERIES
Discharge: HOME | End: 2021-03-31
Payer: COMMERCIAL

## 2021-03-31 ENCOUNTER — VIRTUAL VISIT (OUTPATIENT)
Dept: FAMILY MEDICINE CLINIC | Age: 51
End: 2021-03-31
Payer: COMMERCIAL

## 2021-03-31 DIAGNOSIS — R53.83 FATIGUE, UNSPECIFIED TYPE: ICD-10-CM

## 2021-03-31 DIAGNOSIS — I82.463 ACUTE DEEP VEIN THROMBOSIS (DVT) OF CALF MUSCLE VEIN OF BOTH LOWER EXTREMITIES (HCC): Primary | ICD-10-CM

## 2021-03-31 DIAGNOSIS — G45.9 TIA (TRANSIENT ISCHEMIC ATTACK): ICD-10-CM

## 2021-03-31 DIAGNOSIS — Z72.0 TOBACCO USE: ICD-10-CM

## 2021-03-31 PROBLEM — T84.012A FAILED TOTAL KNEE, RIGHT, INITIAL ENCOUNTER (HCC): Status: RESOLVED | Noted: 2019-12-17 | Resolved: 2021-03-31

## 2021-03-31 PROBLEM — Z96.659 INFECTION OF TOTAL KNEE REPLACEMENT (HCC): Status: RESOLVED | Noted: 2020-01-22 | Resolved: 2021-03-31

## 2021-03-31 PROBLEM — T84.53XA INFECTION OF PROSTHETIC RIGHT KNEE JOINT (HCC): Status: RESOLVED | Noted: 2020-08-12 | Resolved: 2021-03-31

## 2021-03-31 PROBLEM — I82.453 ACUTE DEEP VEIN THROMBOSIS (DVT) OF BOTH PERONEAL VEINS (HCC): Status: RESOLVED | Noted: 2021-03-31 | Resolved: 2021-03-31

## 2021-03-31 PROBLEM — I82.453 ACUTE DEEP VEIN THROMBOSIS (DVT) OF BOTH PERONEAL VEINS (HCC): Status: ACTIVE | Noted: 2021-03-31

## 2021-03-31 PROBLEM — T84.59XA INFECTION OF TOTAL KNEE REPLACEMENT (HCC): Status: RESOLVED | Noted: 2020-01-22 | Resolved: 2021-03-31

## 2021-03-31 PROCEDURE — 99215 OFFICE O/P EST HI 40 MIN: CPT | Performed by: FAMILY MEDICINE

## 2021-03-31 ASSESSMENT — PATIENT HEALTH QUESTIONNAIRE - PHQ9
SUM OF ALL RESPONSES TO PHQ QUESTIONS 1-9: 0
SUM OF ALL RESPONSES TO PHQ QUESTIONS 1-9: 0

## 2021-03-31 NOTE — PROGRESS NOTES
3/31/2021    TELEHEALTH EVALUATION -- Audio/Visual (During TPFHE-02 public health emergency)    HPI:    Juan Luis Blanca (:  1970) has requested an audio/video evaluation for the following concern(s):    3 visits to ED in past week for fatigue, dizziness, leg pain bilaterally 3/26. Chills. He is postop for revision R total knee . Had postop hematomas that needed drainage/treatment on  and 3/5 by Dr Michele Cotto. (Knee is doing rather well now, despite some post-op pain). Has good ROM of knee. Onset of his dizziness and lightheadedness and chills for 1 week, then the leg pain started Friday the .    + DVT 3/28 (). Acute thrombus in R and L calves (Soleus only, not to level of popliteal vein). CT chest neg for PE  He was started on Xarelto starter pack. He is still feeling sporadic chills, lightheadedness, lasting for 30-40 min at a time. He notes that leg pain increases during these episodes. No vertigo. Lightheadedness is worse with position changes. He had an episode of L hand weakness and slurred speech, facial droop that resolved over 5 minutes on 3/2- previously seen for this by me. Referred to Dr Aki Torres, who advised a 30 day event recorder after seeing him 3/29. He also recalled a POS bubble study from years back (after his 2014 CVA) and is also planning to investigate for septal defect again. MRI brain and carotid dopplers to be done . No recurrence of weakness. OTC meds in use: calcium, MVI, senokot. He stopped asa 80 when he started the xarelto, which he started yesterday (got one dose of xarelto in the ED on , missed dose on Monday). No prior blood clots. + prior CVA .  + tobacco use (1/2 ppd)  + medical marijuana, not in use for past 6 wks. + ketamine infusions for chronic abd pain - last done yesterday. Also had RF ablation of celiac nerve (which he thinks will help  Because prior celiac plexus blocks have been beneficial).   This is managed by Dr Taj Borja in [place], [place]. Not on fish oil or other anti-inflammatory meds. No prior diets for 'anti-inflammation'    Has first covid vaccine scheduled this coming Sunday. 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  Chondromalacia of patellofemoral joint    Chronic pain syndrome    History of stroke 2014    Gastroesophageal reflux disease with esophagitis--on daily ppi    Intractable chronic migraine without aura and with status migrainosus    Iron deficiency anemia    B12 deficiency    Malignant neoplasm of left kidney (HCC) clear cell. 8/1/18 Dr Edith Hart.  Failed total knee, right, [facility]    Infection of total knee replacement ([facility]    History of depression    History of alcoholism ([facility]    Infection of prosthetic right knee joint ([facility]    History of total knee arthroplasty, right    Hematoma of right knee region        Prior to Visit Medications    Medication Sig Taking? Authorizing Provider   rivaroxaban (XARELTO STARTER PACK) 15 & 20 MG Starter Pack Take by mouth Yes Historical Provider, MD   oxyCODONE (ROXICODONE) 5 MG immediate release tablet Take 1 tablet by mouth every 6 hours as needed for Pain for up to 7 days. Yes Shyanne Merrill MD   atorvastatin (LIPITOR) 40 MG tablet Take 1 tablet by mouth nightly At bedtime Yes Valeriy Hall MD   zolpidem (AMBIEN) 10 MG tablet Take 1 tablet by mouth nightly as needed (insomnia) for up to 90 days.  Yes Tiffanie R Kehrt, APRN - CNP   traZODone (DESYREL) 100 MG tablet Take 2 tablets by mouth nightly Yes Gisella Hemphill MD   doxycycline hyclate (VIBRAMYCIN) 100 MG capsule Take 1 capsule by mouth 2 times daily Yes Zoraida Kitchen MD   ondansetron (ZOFRAN-ODT) 4 MG disintegrating tablet Take 4 mg by mouth every 8 hours as needed Yes Historical Provider, MD   sildenafil (REVATIO) 20 MG tablet Take 4 tablets by mouth as needed (30 min prior to intercourse) Yes Karla Blas MD   FLUoxetine (PROZAC) 20 MG capsule TAKE 1 CAPSULE BY MOUTH DAILY Yes Karla Blas MD   pantoprazole (PROTONIX) 40 MG tablet Take 1 tablet by mouth 2 times daily Yes Karla Blsa MD   calcium-vitamin D (OSCAL 500/200 D-3) 500-200 MG-UNIT per tablet Take 1 tablet by mouth 2 times daily  Yes Historical Provider, MD   Multiple Vitamin TABS Take 1 tablet by mouth daily  Yes Historical Provider, MD       Social History     Tobacco Use    Smoking status: Current Every Day Smoker     Packs/day: 0.50     Years: 25.00     Pack years: 12.50     Types: Cigarettes    Smokeless tobacco: Never Used   Substance Use Topics    Alcohol use: No     Alcohol/week: 0.0 standard drinks     Comment: last drink 2015    Drug use: Yes     Types: Marijuana     Comment: medical marjiuana card          PHYSICAL EXAMINATION:  Physical Exam  Constitutional:       General: He is not in acute distress. Appearance: Normal appearance. He is not ill-appearing. HENT:      Head: Normocephalic and atraumatic. Neck:      Musculoskeletal: Normal range of motion. Pulmonary:      Effort: Pulmonary effort is normal.   Musculoskeletal: Normal range of motion. Skin:     Findings: No rash. Neurological:      General: No focal deficit present. Mental Status: He is alert and oriented to person, place, and time. Mental status is at baseline. Psychiatric:         Mood and Affect: Mood normal.         Behavior: Behavior normal.         Thought Content:  Thought content normal.         Judgment: Judgment normal.        ASSESSMENT/PLAN:    1. Acute deep vein thrombosis (DVT) of calf muscle vein of both lower extremities (HCC)  - despite being small distal veins, these are symtpomatic and bilateral.  discussed that I do recommend continuing xarelto for 3 month, rechecking the dopplers in 3 months or if there is progression of symptoms. Dose will be 15 mg bid x 3 wks, then 20 mg/d. - we discussed the mysterious nature of states of increased thrombosis and inflammation, such as after surgery and viral infections and other various immune system disrupters. Not well understood, but he has had post op complications, a TIA, now mild DVT's in the past 6 weeks. Made some recommendations to consider:  1. STOP SMOKING (likely the greastest pro-inflammatory trigger)  2. Consider the so-called 'antiinflammatory' diet (recommend gluten/dairy/egg free, such as done by those with autoimmune and endometriosis conditions, with anecdotal support by many)  3. Resume asa 81    2. TIA (transient ischemic attack) LUE weakness 3/21  - proceed with work up as discussed previously, with scans and Dr Lexy Schuster studies. - restart asa 81  - continue atorva 40.    3. Tobacco use  - discussed the pro-inflammatory effect of tobacco use. Urged to stop immediately and use NRT products to manage nicotine withdrawal.    4. Fatigue, unspecified type  - with lightheadedness and other vague, non-focal symtpoms. Cause not clear. Could be medicine side effects. I have seen these nonspecific symptoms improve among those that try the anti-inflammatory diet as discussed above. Though it is a difficult diet, persons that benefit from it tend to continue on it. Return in about 4 weeks (around 4/28/2021) for f/u DVT. Viviana Rios is a 53 y.o. male being evaluated by a Virtual Visit (video visit) encounter to address concerns as mentioned above. A caregiver was present when appropriate.  Due to this being a TeleHealth encounter (During GECJO-66 public health emergency), evaluation of the following organ systems was limited: Vitals/Constitutional/EENT/Resp/CV/GI//MS/Neuro/Skin/Heme-Lymph-Imm. Pursuant to the emergency declaration under the [address] [place] [address], [address] [place] [address] authority and the [employer] Act, this Virtual Visit was conducted with patient's (and/or legal guardian's) consent, to reduce the patient's risk of exposure to COVID-19 and provide necessary medical care. The patient (and/or legal guardian) has also been advised to contact this office for worsening conditions or problems, and seek emergency medical treatment and/or call 911 if deemed necessary. Patient identification was verified at the start of the visit: Yes    Total time spent on this encounter: 40 min    Services were provided through a video synchronous discussion virtually to substitute for in-person clinic visit. Patient and provider were located at their individual homes. --Reji Griggs MD on 3/31/2021 at 11:49 AM    An electronic signature was used to authenticate this note.

## 2021-03-31 NOTE — FLOWSHEET NOTE
[place] [facility], [facility]  [address] [facility] [place], [facility]  Phone: [phone]   Fax:     [phone]    Physical Therapy  Cancellation/No-show Note  Patient Name:  Leobardo Sweet  :  1970   Date:  3/31/2021  Cancelled visits to date: 1  No-shows to date: 1    Patient status for today's appointment patient:  []  Cancelled  []  Rescheduled appointment  [x]  No-show     Reason given by patient:  [x]  Patient ill  []  Conflicting appointment  []  No transportation    []  Conflict with work  []  No reason given  [x]  Other:     Comments:       Phone call information:   [x]  Phone call made today to patient at 9:15 am _ time at number provided:      []  Patient answered, conversation as follows:        [x]  Patient did not answer, message left as follows: left message re: NS and to get update on his health issues, instructed pt to call if able to notify therapist of his status.      []  Phone call not made today    Electronically signed by:  Sammy De Paz, PTA  [id number]

## 2021-04-01 ENCOUNTER — HOSPITAL ENCOUNTER (OUTPATIENT)
Dept: VASCULAR LAB | Age: 51
Discharge: HOME | End: 2021-04-01
Payer: COMMERCIAL

## 2021-04-01 ENCOUNTER — HOSPITAL ENCOUNTER (OUTPATIENT)
Dept: MRI IMAGING | Age: 51
Discharge: HOME | End: 2021-04-01
Payer: COMMERCIAL

## 2021-04-01 DIAGNOSIS — Z86.73 HISTORY OF STROKE: ICD-10-CM

## 2021-04-01 DIAGNOSIS — G45.9 TIA (TRANSIENT ISCHEMIC ATTACK): ICD-10-CM

## 2021-04-01 DIAGNOSIS — I82.463 ACUTE DEEP VEIN THROMBOSIS (DVT) OF CALF MUSCLE VEIN OF BOTH LOWER EXTREMITIES (HCC): Primary | ICD-10-CM

## 2021-04-01 PROCEDURE — 6360000004 HC RX CONTRAST MEDICATION: Performed by: FAMILY MEDICINE

## 2021-04-01 PROCEDURE — 93880 EXTRACRANIAL BILAT STUDY: CPT

## 2021-04-01 PROCEDURE — A9577 INJ MULTIHANCE: HCPCS | Performed by: FAMILY MEDICINE

## 2021-04-01 PROCEDURE — 70553 MRI BRAIN STEM W/O & W/DYE: CPT

## 2021-04-01 RX ORDER — ASPIRIN 81 MG/1
81 TABLET, CHEWABLE ORAL DAILY
Refills: 0 | COMMUNITY
Start: 2021-04-01 | End: 2021-08-05 | Stop reason: HOSPADM

## 2021-04-01 RX ADMIN — GADOBENATE DIMEGLUMINE 20 ML: 529 INJECTION, SOLUTION INTRAVENOUS at 08:06

## 2021-04-02 ENCOUNTER — TELEPHONE (OUTPATIENT)
Dept: ORTHOPEDIC SURGERY | Age: 51
End: 2021-04-02

## 2021-04-02 ENCOUNTER — APPOINTMENT (OUTPATIENT)
Dept: PHYSICAL THERAPY | Age: 51
End: 2021-04-02
Payer: COMMERCIAL

## 2021-04-02 DIAGNOSIS — Z96.651 HISTORY OF TOTAL KNEE ARTHROPLASTY, RIGHT: Primary | ICD-10-CM

## 2021-04-02 NOTE — TELEPHONE ENCOUNTER
Patient sent Baileyu message requesting a refill of his pain medication. R TKA 02.16.2021. Please advise.

## 2021-04-03 RX ORDER — OXYCODONE HYDROCHLORIDE 5 MG/1
5 TABLET ORAL EVERY 6 HOURS PRN
Qty: 28 TABLET | Refills: 0 | Status: SHIPPED | OUTPATIENT
Start: 2021-04-03 | End: 2021-04-08 | Stop reason: SDUPTHER

## 2021-04-03 NOTE — TELEPHONE ENCOUNTER
Patient calling for narcotic refill, message sent to Dr Renee Stringer today, will reserve for Dr Jose Armando Lynn to refill narcotics if warranted

## 2021-04-05 ENCOUNTER — HOSPITAL ENCOUNTER (OUTPATIENT)
Dept: PHYSICAL THERAPY | Age: 51
Setting detail: THERAPIES SERIES
Discharge: HOME | End: 2021-04-05
Payer: COMMERCIAL

## 2021-04-05 PROCEDURE — 97110 THERAPEUTIC EXERCISES: CPT

## 2021-04-05 PROCEDURE — 97140 MANUAL THERAPY 1/> REGIONS: CPT

## 2021-04-05 NOTE — FLOWSHEET NOTE
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)  · Treatment Diagnosis: decreased strength, mobility    Insurance/Certification information:  PT Insurance Information: [facility]  Physician Information:  Referring Practitioner: Chu Griffith MD  Plan of care signed (Y/N): sent for cosign 3/21 (received)    Date of Patient follow up with Physician: 3/25     Progress Report: []  Yes  [x]  No     Date Range for reporting period:  Beginning:  3/22/2021  Ending:      Progress report due (10 Rx/or 30 days whichever is less):     Recertification due (POC duration/ or 90 days whichever is less): 21    Visit # POC/Insurance Allowable Auth Needed   3/12 BOMN []Yes   [x]No     Latex Allergy:  [x]NO      []YES  Preferred Language for Healthcare:   [x]English       []Other:    Functional Scale:       Date assessed: at eval  Test: WOMAC  Score: 24/96= 25% disability    Pain level:  5/10     History of Injury: Patient states he underwent R TKR in 2013. Patient then started having issues with TKR in  which involved taking out the spacer and placing a new one. Patient developed an infection in knee after this procedure. In , patient underwent a few I&D. In 2020, implant was removed and spacer put. In 2021, new implant was placed. He underwent incision and drainage of a hematoma on 2021 and again on 2021. Still continues to take antibiotics. To have sutures removed this week. Patient has been receiving home PT which ended recently. Currently using a cane for ambulation. Goes up stairs one at a time but goes down reciprocally. SUBJECTIVE: 3/24: little sore after last session but not bad.   4/5/21: Pt states he was in the ER for blood clots on 3/28, MD cleared him to return to PT (verified via telephone encounter in notes). States he has been on his feet a lot today because they went and bought a new camper so he is already pretty tired. OBJECTIVE: Per telephone encounter from MD pt was cleared to return to PT following blood clots on 3/28.  Observation:   · Palpation: no TTP noted  · Functional Mobility/Transfers: performs all independently  · Posture: slightly flexed knee in standing  · Bandages/Dressings/Incisions: incision healing with sutures in place, no signs of infection  · Gait: (include devices/WB status) Patient ambulates with straight cane demonstrating decreased terminal knee extension at heel strike and midstance.    ·  Test measurements:    ROM:  Date           Knee Flexion       Knee Extension    Active Passive Active Passive    Eval 3/22 110 115 Lacking 5 degrees Lacking 5 degrees   4/5/21 112 deg        Strength:  Date Hip flexion Hip abduction Quad Hamstring Ankle DF Ankle PF   Eval 3/22 3+/5 4/5 3-/5 3/5 4-/5 3/5                RESTRICTIONS/PRECAUTIONS: none    Exercises/Interventions:   Exercises performed on RLE  Therapeutic Ex (89785)   Min: 30 Reps/Resistance Notes/CUES      HS stretch EOT 3x30 sec       SB roll in 10\" x 10    Bridge w/ ADD 2 x 10 x 3\"    Clamshells 2 x 10    Quad sets supine 5\" x 15 Towel roll under knee   SLR flex 0# 10x  challenging   Gastroc Incline 3x30 sec    Mini squats 10x     Stand HR 2 x 10    Tband TKE [place] 5\" x 15 fatigued                                  Therapeutic Activity (59441) Min: 8           Tandem stance x1 min L/R     Air Ex: NBOS with EC NV?               NMR re-education (71054)  Min:  CUES NEEDED             Manual Intervention ([id number]) Min: 5     STM quad, ITB, HS and gastroc  Patellar mobs all directions  G2 flex mobs to tibia 11' Tight distal ITB, tender w/ mobilizations of tibia        Gait Training   (70831) Min:0     In clinic with straight cane          Modalities  Min:     CP after exercises Pt declined, will do at home           Other Therapeutic Activities: Pt was educated on PT POC, Diagnosis, Prognosis, pathomechanics as well as frequency and duration of scheduling future physical therapy appointments. Time was also taken on this day to answer all patient questions and participation in PT. Reviewed appointment policy in detail with patient and patient verbalized understanding. Home Exercise Program: Reviewed current HEP with patient. Added standing knee extension and knee flexion stretch on step. Patient verbalized/demonstrated understanding. Therapeutic Exercise and NMR EXR  [x] (19742) Provided verbal/tactile cueing for activities related to strengthening, flexibility, endurance, ROM for improvements in LE, proximal hip, and core control with self care, mobility, lifting, ambulation.  [] (35179) Provided verbal/tactile cueing for activities related to improving balance, coordination, kinesthetic sense, posture, motor skill, proprioception  to assist with LE, proximal hip, and core control in self care, mobility, lifting, ambulation and eccentric single leg control. [address] and Therapeutic Activities:    [] (36023 or 77539) Provided verbal/tactile cueing for activities related to improving balance, coordination, kinesthetic sense, posture, motor skill, proprioception and motor activation to allow for proper function of core, proximal hip and LE with self care and ADLs and functional mobility.    [x] (84718) Gait Re-education- Provided training and instruction to the patient for proper LE, core and proximal hip recruitment and positioning and eccentric body weight control with ambulation re-education including up and down stairs     Home Exercise Program:    [x] (64494) Reviewed/Progressed HEP activities related to strengthening, flexibility, endurance, ROM of core, proximal hip and LE for functional self-care, mobility, lifting and ambulation/stair navigation   [] (95643)Reviewed/Progressed HEP activities related to improving balance, coordination, kinesthetic sense, posture, motor skill, proprioception of core, proximal hip and LE for self care, mobility, lifting, and ambulation/stair navigation      Manual Treatments:  PROM / STM / Oscillations-Mobs:  G-I, II, III, IV (PA's, Inf., Post.)  [] (08648) Provided manual therapy to mobilize LE, proximal hip and/or LS spine soft tissue/joints for the purpose of modulating pain, promoting relaxation,  increasing ROM, reducing/eliminating soft tissue swelling/inflammation/restriction, improving soft tissue extensibility and allowing for proper ROM for normal function with self care, mobility, lifting and ambulation. Charges:  Timed Code Treatment Minutes: 40   Total Treatment Minutes: 40      [] EVAL (LOW) 01691 (typically 20 minutes face-to-face)  [] EVAL (MOD) 78766 (typically 30 minutes face-to-face)  [] EVAL (HIGH) 40744 (typically 45 minutes face-to-face)  [] RE-EVAL     [x] BE(92643) x  2   [] Dry needle 1 or 2 Muscles (95739)  [] NMR (36684) x     [] Dry needle 3+ Muscles (44333)  [] Manual (64810) x     [] Ultrasound (87529) x  [x] TA (23564) x   1  [] Mech Traction (57449)  [] ES(attended) (15579)     [] ES (un) (32464):   [] Vasopump (83072)  [] Gait (38658)  [] Other:    GOALS:  Patient stated goal: \"Increase my strength\"  []? Progressing: []? Met: []? Not Met: []? Adjusted     Therapist goals for Patient:   Short Term Goals: To be achieved in: 2 weeks  1. Independent in HEP and progression per patient tolerance, in order to prevent re-injury. []?  Progressing: []? Met: []? Not Met: []? Adjusted  2. Patient will have a decrease in pain by 40% to facilitate improvement in movement, function, and ADLs as indicated by Functional Deficits. []? Progressing: []? Met: []? Not Met: []? Adjusted     Long Term Goals: To be achieved in: at discharge  1. Disability index score of 25% or less for the [facility] to assist with reaching prior level of function. []? Progressing: []? Met: []? Not Met: []? Adjusted  2. Patient will demonstrate increased AROM to 5-120 to allow for proper joint functioning as indicated by patients Functional Deficits. []? Progressing: []? Met: []? Not Met: []? Adjusted  3. Patient will demonstrate an increase in Strength to 4+/5 to allow for proper functional mobility as indicated by patients Functional Deficits. []? Progressing: []? Met: []? Not Met: []? Adjusted  4. Patient will return to going up and down stairs reciprocally without increased symptoms or restriction. []? Progressing: []? Met: []? Not Met: []? Adjusted  5. Patient will be able to ambulate in community without AD demonstrating good gait pattern. []? Progressing: []? Met: []? Not Met: []? Adjusted           ASSESSMENT:  Patient challenged with basic therex on the table today likely dt/ having a busy day and having a complication of blood clots last Sunday. Overall his quad is functioning fairly well given his complicated history with the right knee. Treatment/Activity Tolerance:  [x] Patient tolerated treatment well [] Patient limited by fatique  [] Patient limited by pain  [] Patient limited by other medical complications  [] Other:     Overall Progression Towards Functional goals/ Treatment Progress Update:  [] Patient is progressing as expected towards functional goals listed. [] Progression is slowed due to complexities/Impairments listed. [] Progression has been slowed due to co-morbidities.   [x] Plan just implemented, too soon to assess goals progression <30days   [] Goals require adjustment due to lack of progress  [] Patient is not progressing as expected and requires additional follow up with physician  [] Other    Prognosis for POC: [x] Good [] Fair  [] Poor    Patient requires continued skilled intervention: [x] Yes  [] No      PLAN: Resume step up activities  [x] Continue per plan of care [] Alter current plan (see comments)  [] Plan of care initiated [] Hold pending MD visit [] Discharge    Electronically signed by: Sheryle East, PT, DPT [id number]      Note: If patient does not return for scheduled/recommended follow up visits, this note will serve as a discharge from care along with the most recent update on progress.

## 2021-04-07 ENCOUNTER — HOSPITAL ENCOUNTER (OUTPATIENT)
Dept: PHYSICAL THERAPY | Age: 51
Setting detail: THERAPIES SERIES
Discharge: HOME | End: 2021-04-07
Payer: COMMERCIAL

## 2021-04-07 PROCEDURE — 97530 THERAPEUTIC ACTIVITIES: CPT

## 2021-04-07 PROCEDURE — 97110 THERAPEUTIC EXERCISES: CPT

## 2021-04-07 NOTE — FLOWSHEET NOTE
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)  · Treatment Diagnosis: decreased strength, mobility    Insurance/Certification information:  PT Insurance Information: [facility]  Physician Information:  Referring Practitioner: Kris John MD  Plan of care signed (Y/N): sent for cosign 3/21 (received)    Date of Patient follow up with Physician: 3/25     Progress Report: []  Yes  [x]  No     Date Range for reporting period:  Beginning:  3/22/2021  Ending:      Progress report due (10 Rx/or 30 days whichever is less):     Recertification due (POC duration/ or 90 days whichever is less): 21    Visit # POC/Insurance Allowable Auth Needed    BOMN []Yes   [x]No     Latex Allergy:  [x]NO      []YES  Preferred Language for Healthcare:   [x]English       []Other:    Functional Scale:       Date assessed: at eval  Test: U [facility]  Score: 24/96= 25% disability    Pain level:  4/10     History of Injury: Patient states he underwent R TKR in 2013. Patient then started having issues with TKR in  which involved taking out the spacer and placing a new one. Patient developed an infection in knee after this procedure. In , patient underwent a few I&D. In 2020, implant was removed and spacer put. In 2021, new implant was placed. He underwent incision and drainage of a hematoma on 2021 and again on 2021. Still continues to take antibiotics. To have sutures removed this week. Patient has been receiving home PT which ended recently. Currently using a cane for ambulation. Goes up stairs one at a time but goes down reciprocally. SUBJECTIVE: 3/24: little sore after last session but not bad.   4/5/21: Pt states he was in the ER for blood clots on 3/28, MD cleared him to return to PT (verified via telephone encounter in notes). States he has been on his feet a lot today because they went and bought a new camper so he is already pretty tired. 4-7-21  Feeling ok today. To PT with st cane. Pain meds 3-4x/ day. OBJECTIVE: Per telephone encounter from MD pt was cleared to return to PT following blood clots on 3/28.  Observation:   · Palpation: no TTP noted  · Functional Mobility/Transfers: performs all independently  · Posture: slightly flexed knee in standing  · Bandages/Dressings/Incisions: incision healing with sutures in place, no signs of infection  · Gait: (include devices/WB status) Patient ambulates with straight cane demonstrating decreased terminal knee extension at heel strike and midstance.    ·  Test measurements:    ROM:  Date           Knee Flexion       Knee Extension    Active Passive Active Passive    Eval 3/22 110 115 Lacking 5 degrees Lacking 5 degrees   4/5/21 112 deg        Strength:  Date Hip flexion Hip abduction Quad Hamstring Ankle DF Ankle PF   Eval 3/22 3+/5 4/5 3-/5 3/5 4-/5 3/5                RESTRICTIONS/PRECAUTIONS: none    Exercises/Interventions:   Exercises performed on RLE  Therapeutic Ex (01946)   Min: 30 Reps/Resistance Notes/CUES   Nu stepL1 x5 mins   HS stretch EOT 3x30 sec R    Knee flexion step stretch 3x20 sec R       Bridge w/ ADD ball 2 x 10 x 3\"    Clamshells 2 x 10 R    Quad sets supine 5\" x 15 R Towel roll under knee   SLR flex 0# 10x  christiano better today   Gastroc Incline 3x30 sec B    Mini squats 10x     Stand HR 2 x 10 B    Tband TKE [place] 5\" x 15 R Seated FAQ 2# 2x10 R    Seated tband HS curls [place] 2x10 R                        Therapeutic Activity (31925) Min: 8     Step up fwd 6\" 10x R     Step downs 4\" 10x R    Tandem stance x1 min L/R     Air Ex: NBOS with EC NV? NMR re-education (94120)  Min:  CUES NEEDED             Manual Intervention ([id number]) Min: 5     STM quad, ITB, HS and   Patellar mobs all directions  G2 flex mobs to tibia 5         Gait Training   (34897) Min:0     In clinic with straight cane          Modalities  Min:     CP after exercises Pt declined, will do at home           Other Therapeutic Activities: Pt was educated on PT POC, Diagnosis, Prognosis, pathomechanics as well as frequency and duration of scheduling future physical therapy appointments. Time was also taken on this day to answer all patient questions and participation in PT. Reviewed appointment policy in detail with patient and patient verbalized understanding. Home Exercise Program: Reviewed current HEP with patient. Added standing knee extension and knee flexion stretch on step. Patient verbalized/demonstrated understanding. Therapeutic Exercise and NMR EXR  [x] (01049) Provided verbal/tactile cueing for activities related to strengthening, flexibility, endurance, ROM for improvements in LE, proximal hip, and core control with self care, mobility, lifting, ambulation.  [] (70146) Provided verbal/tactile cueing for activities related to improving balance, coordination, kinesthetic sense, posture, motor skill, proprioception  to assist with LE, proximal hip, and core control in self care, mobility, lifting, ambulation and eccentric single leg control.  [address] and Therapeutic Activities:    [] (64574 or 37489) Provided verbal/tactile cueing for activities related to improving balance, coordination, kinesthetic sense, posture, motor skill, proprioception and motor activation to allow for proper function of core, proximal hip and LE with self Short Term Goals: To be achieved in: 2 weeks  1. Independent in HEP and progression per patient tolerance, in order to prevent re-injury. []? Progressing: []? Met: []? Not Met: []? Adjusted  2. Patient will have a decrease in pain by 40% to facilitate improvement in movement, function, and ADLs as indicated by Functional Deficits. []? Progressing: []? Met: []? Not Met: []? Adjusted     Long Term Goals: To be achieved in: at discharge  1. Disability index score of 25% or less for the [facility] to assist with reaching prior level of function. []? Progressing: []? Met: []? Not Met: []? Adjusted  2. Patient will demonstrate increased AROM to 5-120 to allow for proper joint functioning as indicated by patients Functional Deficits. []? Progressing: []? Met: []? Not Met: []? Adjusted  3. Patient will demonstrate an increase in Strength to 4+/5 to allow for proper functional mobility as indicated by patients Functional Deficits. []? Progressing: []? Met: []? Not Met: []? Adjusted  4. Patient will return to going up and down stairs reciprocally without increased symptoms or restriction. []? Progressing: []? Met: []? Not Met: []? Adjusted  5. Patient will be able to ambulate in community without AD demonstrating good gait pattern. []? Progressing: []? Met: []? Not Met: []? Adjusted           ASSESSMENT:  Patient challenged with basic therex on the table today likely dt/ having a busy day and having a complication of blood clots last Sunday. Overall his quad is functioning fairly well given his complicated history with the right knee. Treatment/Activity Tolerance:  [x] Patient tolerated treatment well [] Patient limited by fatique  [] Patient limited by pain  [] Patient limited by other medical complications  [] Other:     Overall Progression Towards Functional goals/ Treatment Progress Update:  [] Patient is progressing as expected towards functional goals listed.     [] Progression is slowed due to

## 2021-04-08 ENCOUNTER — PATIENT MESSAGE (OUTPATIENT)
Dept: ORTHOPEDIC SURGERY | Age: 51
End: 2021-04-08

## 2021-04-08 DIAGNOSIS — Z96.651 HISTORY OF TOTAL KNEE ARTHROPLASTY, RIGHT: ICD-10-CM

## 2021-04-08 RX ORDER — OXYCODONE HYDROCHLORIDE 5 MG/1
5 TABLET ORAL EVERY 6 HOURS PRN
Qty: 28 TABLET | Refills: 0 | Status: SHIPPED | OUTPATIENT
Start: 2021-04-08 | End: 2021-04-16 | Stop reason: SDUPTHER

## 2021-04-09 ENCOUNTER — APPOINTMENT (OUTPATIENT)
Dept: PHYSICAL THERAPY | Age: 51
End: 2021-04-09
Payer: COMMERCIAL

## 2021-04-12 ENCOUNTER — HOSPITAL ENCOUNTER (OUTPATIENT)
Dept: PHYSICAL THERAPY | Age: 51
Setting detail: THERAPIES SERIES
Discharge: HOME | End: 2021-04-12
Payer: COMMERCIAL

## 2021-04-12 PROCEDURE — 97110 THERAPEUTIC EXERCISES: CPT

## 2021-04-12 PROCEDURE — 97530 THERAPEUTIC ACTIVITIES: CPT

## 2021-04-12 NOTE — FLOWSHEET NOTE
[facility] - Outpatient Rehabilitation and Therapy, [facility]  [address] [facility] [place], 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)  · Treatment Diagnosis: decreased strength, mobility    Insurance/Certification information:  PT Insurance Information: [facility]  Physician Information:  Referring Practitioner: Ciaran Curran MD  Plan of care signed (Y/N): sent for cosign 3/21 (received)    Date of Patient follow up with Physician: 3/25     Progress Report: []  Yes  [x]  No     Date Range for reporting period:  Beginning:  3/22/2021  Ending:      Progress report due (10 Rx/or 30 days whichever is less):     Recertification due (POC duration/ or 90 days whichever is less): 21    Visit # POC/Insurance Allowable Auth Needed    BOMN []Yes   [x]No     Latex Allergy:  [x]NO      []YES  Preferred Language for Healthcare:   [x]English       []Other:    Functional Scale:       Date assessed: at eval  Test: U [facility]  Score: 24/96= 25% disability    Pain level:  3/10     History of Injury: Patient states he underwent R TKR in 2013. Patient then started having issues with TKR in  which involved taking out the spacer and placing a new one. Patient developed an infection in knee after this procedure. In , patient underwent a few I&D. In 2020, implant was removed and spacer put. In 2021, new implant was placed. He underwent incision and drainage of a hematoma on 2021 and again on 2021. Leg Press 80# 2x10 B  40# 1x10 R    Seated FAQ 2# 2x10 R    Seated tband HS curls [place] 2x10 R         SLR flexion 0# 10x R    Bridge with Add ball 2x10, 3 sec holds    Clamshells 2x10 R    QS supine 15x , 5 sec holds    SAQ add         Therapeutic Activity (22145) Min: 8     Step up fwd 6\" 10x R     Step downs 4\" 10x R    Tandem stance x1 min L/R     Air Ex: NBOS with EC x1 min    [employer] board s/s, f/b Add          NMR re-education (28988)  Min:  CUES NEEDED             Manual Intervention (88984) Min: 5     Patellar mobs all directions  PROM flexion/extension x2 mins    x3 mins     heelslides with OP / supine ext with OP        Gait Training   (43419) Min:0     In clinic with straight cane          Modalities  Min:     CP after exercises Pt declined, will do at home           Other Therapeutic Activities: Pt was educated on PT POC, Diagnosis, Prognosis, pathomechanics as well as frequency and duration of scheduling future physical therapy appointments. Time was also taken on this day to answer all patient questions and participation in PT. Reviewed appointment policy in detail with patient and patient verbalized understanding. Home Exercise Program: Reviewed current HEP with patient. Added standing knee extension and knee flexion stretch on step. Patient verbalized/demonstrated understanding. Therapeutic Exercise and NMR EXR  [x] (73480) Provided verbal/tactile cueing for activities related to strengthening, flexibility, endurance, ROM for improvements in LE, proximal hip, and core control with self care, mobility, lifting, ambulation.  [] (97305) Provided verbal/tactile cueing for activities related to improving balance, coordination, kinesthetic sense, posture, motor skill, proprioception  to assist with LE, proximal hip, and core control in self care, mobility, lifting, ambulation and eccentric single leg control.  [address] and Therapeutic Activities:    [] (25762 or 48298) Provided verbal/tactile cueing for activities related to improving balance, coordination, kinesthetic sense, posture, motor skill, proprioception and motor activation to allow for proper function of core, proximal hip and LE with self care and ADLs and functional mobility. [x] (44564) Gait Re-education- Provided training and instruction to the patient for proper LE, core and proximal hip recruitment and positioning and eccentric body weight control with ambulation re-education including up and down stairs     Home Exercise Program:    [x] (87124) Reviewed/Progressed HEP activities related to strengthening, flexibility, endurance, ROM of core, proximal hip and LE for functional self-care, mobility, lifting and ambulation/stair navigation   [] (93941)Reviewed/Progressed HEP activities related to improving balance, coordination, kinesthetic sense, posture, motor skill, proprioception of core, proximal hip and LE for self care, mobility, lifting, and ambulation/stair navigation      Manual Treatments:  PROM / STM / Oscillations-Mobs:  G-I, II, III, IV (PA's, Inf., Post.)  [] (28255) Provided manual therapy to mobilize LE, proximal hip and/or LS spine soft tissue/joints for the purpose of modulating pain, promoting relaxation,  increasing ROM, reducing/eliminating soft tissue swelling/inflammation/restriction, improving soft tissue extensibility and allowing for proper ROM for normal function with self care, mobility, lifting and ambulation.      Charges:  Timed Code Treatment Minutes: 43   Total Treatment Minutes: 43      [] EVAL (LOW) 67267 (typically 20 minutes face-to-face)  [] EVAL (MOD) 77927 (typically 30 minutes face-to-face)  [] EVAL (HIGH) 73846 (typically 45 minutes face-to-face)  [] RE-EVAL     [x] MC(79511) x  2   [] Dry needle 1 or 2 Muscles (79017)  [] NMR (59380) x     [] Dry needle 3+ Muscles (61510)  [] Manual (01049) x     [] Ultrasound (11396) x  [x] TA (33010) x   1  [] Mech Traction (20144)  [] ES(attended) other medical complications  [] Other:     Overall Progression Towards Functional goals/ Treatment Progress Update:  [x] Patient is progressing as expected towards functional goals listed. [] Progression is slowed due to complexities/Impairments listed. [] Progression has been slowed due to co-morbidities. [] Plan just implemented, too soon to assess goals progression <30days   [] Goals require adjustment due to lack of progress  [] Patient is not progressing as expected and requires additional follow up with physician  [] Other    Prognosis for POC: [x] Good [] Fair  [] Poor    Patient requires continued skilled intervention: [x] Yes  [] No      PLAN: Add above as stated  [x] Continue per plan of care [] Alter current plan (see comments)  [] Plan of care initiated [] Hold pending MD visit [] Discharge    Electronically signed by: Yadi Reveles, PT, , OMT-C,  [id number]      Note: If patient does not return for scheduled/recommended follow up visits, this note will serve as a discharge from care along with the most recent update on progress.

## 2021-04-14 ENCOUNTER — HOSPITAL ENCOUNTER (OUTPATIENT)
Dept: PHYSICAL THERAPY | Age: 51
Setting detail: THERAPIES SERIES
Discharge: HOME | End: 2021-04-14
Payer: COMMERCIAL

## 2021-04-14 PROCEDURE — 97110 THERAPEUTIC EXERCISES: CPT

## 2021-04-14 PROCEDURE — 97530 THERAPEUTIC ACTIVITIES: CPT

## 2021-04-14 NOTE — PLAN OF CARE
[facility] - Outpatient Rehabilitation and Therapy, [facility]  [address] [facility]  Phone: [phone]   Fax:     [phone]       Physical Therapy Discharge Summary    Dear  Dr. Supa Lazo,    We had the pleasure of treating the following patient for physical therapy services at [address]. A summary of our findings can be found in the discharge summary below. If you have any questions or concerns regarding these findings, please do not hesitate to contact me at the office phone number above. Thank you for the referral.     Physician Signature:________________________________Date:__________________  By signing above (or electronic signature), therapists plan is approved by physician       Overall Response to Treatment:   [x]Patient is responding well to treatment and improvement is noted with regards  to goals   [x]Patient should continue to improve in reasonable time if they continue HEP   []Patient has plateaued and is no longer responding to skilled PT intervention    []Patient is getting worse and would benefit from return to referring MD   []Patient unable to adhere to initial POC   []Other:    ASSESSMENT:  Patient has met all therapy goals at this time. Patient has achieved excellent ROM. Strength will continue to improve with time and compliance with HEP. Patient is comfortable continuing on own with HEP at this time.      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)  · Treatment Diagnosis: decreased strength, mobility    Insurance/Certification information:  PT Insurance Information: [employer]  Physician Information:  Referring Practitioner: Elinae Shaffer MD  Plan of care signed (Y/N): sent for cosign 3/21 (received)    Date of Patient follow up with Physician: 3/25     Progress Report: [x]  Yes  []  No     Date Range for reporting period:  Beginning:  3/22/2021  Endin21     Progress report due (10 Rx/or 30 days whichever is less):     Recertification due (POC duration/ or 90 days whichever is less): 21    Visit # POC/Insurance Allowable Auth Needed    BOMN []Yes   [x]No     Latex Allergy:  [x]NO      []YES  Preferred Language for Healthcare:   [x]English       []Other:    Functional Scale:       Date assessed: 21  Test: [facility]  Score: 3/96= 3% disability    Pain level:  3/10     History of Injury: Patient states he underwent R TKR in 2013. Patient then started having issues with TKR in  which involved taking out the spacer and placing a new one. Patient developed an infection in knee after this procedure. In , patient underwent a few I&D. In 2020, implant was removed and spacer put. In 2021, new implant was placed. He underwent incision and drainage of a hematoma on 2021 and again on 2021. Still continues to take antibiotics. To have sutures removed this week. Patient has been receiving home PT which ended recently. Currently using a cane for ambulation. Goes up stairs one at a time but goes down reciprocally. SUBJECTIVE: 3/24: little sore after last session but not bad.   21: Pt states he was in the ER for blood clots on 3/28, MD cleared him to return to PT (verified via telephone encounter in notes). States he has been on his feet a lot today because they went and bought a new camper so he is already pretty tired. 21  Feeling ok today. To PT with st cane. Pain meds 3-4x/ day. 4/12: notes the past 4-5 days he feels like he has really turned a corner. Patient states he has been able to do steps reciprocally. Also notes being able to walk around at home without cane majority of the day. Only using cane when he is tired at end of day. 4/14:  Patient reports 90% improvement overall with pain. Patient is now able to ambulate throughout the day without use of AD. OBJECTIVE:    Observation: Updated 4/14/21  · Palpation: no TTP noted  · Functional Mobility/Transfers: performs all independently  · Posture: slightly flexed knee in standing  · Bandages/Dressings/Incisions: incision healing with sutures in place, no signs of infection  · Gait: (include devices/WB status) Patient ambulates without AD cane demonstrating improved terminal knee extension at heel strike and midstance.    ·  Test measurements:    ROM:  Date           Knee Flexion       Knee Extension    Active Passive Active Passive    Eval 3/22 110 115 Lacking 5 degrees Lacking 5 degrees   4/5/21 112 deg      4/14/21 125 125 0 Lacking 2 degrees     Strength:  Date Hip flexion Hip abduction Quad Hamstring Ankle DF Ankle PF   Eval 3/22 3+/5 4/5 3-/5 3/5 4-/5 3/5   4/14/21 4+/5 4+/5 4+/5 5/5 5/5 4+/5       RESTRICTIONS/PRECAUTIONS: none    Exercises/Interventions:     Therapeutic Ex (76182)   Min: 30 Reps/Resistance Notes/CUES   Nu stepL1 x5 mins   HS stretch on step 3x30 sec R    Knee flexion step stretch 3x20 sec R    Gastroc Incline 3x30 sec B    Mini squats 15x     Stand HR 2 x 10 B    Tband TKE [place] 5\" x 15 R         Leg Press 80# 2x10 B  40# 2x10 R    Seated FAQ 3# 2x10 R    Seated tband HS curls 15# 2x10 R         SLR flexion 0# 2x10 R    Bridge with Add ball 2x10, 3 sec holds    QS supine 15x , 5 sec holds    SAQ 3# 2x10 R         Therapeutic Activity (04812) Min: 8     Step up fwd 6\" 10x R     Step downs 6\" 10x R    Tandem stance x1 min L/R     Air Ex: NBOS with EC x1 min    SLS Add     [employer] board s/s, f/b x1 min each          NMR re-education (34054)  Min:  CUES NEEDED             Manual Intervention (10836) Min: 5     Patellar mobs all directions  PROM flexion/extension x2 mins    x3 mins     heelslides with OP / supine ext with OP        Gait Training   (18617) Min:0     In clinic with straight cane          Modalities  Min:     CP after exercises Pt declined, will do at home           Other Therapeutic Activities: Pt was educated on PT POC, Diagnosis, Prognosis, pathomechanics as well as frequency and duration of scheduling future physical therapy appointments. Time was also taken on this day to answer all patient questions and participation in PT. Reviewed appointment policy in detail with patient and patient verbalized understanding. Home Exercise Program: Reviewed current HEP with patient. Added standing knee extension and knee flexion stretch on step. Patient verbalized/demonstrated understanding. Therapeutic Exercise and NMR EXR  [x] (05063) Provided verbal/tactile cueing for activities related to strengthening, flexibility, endurance, ROM for improvements in LE, proximal hip, and core control with self care, mobility, lifting, ambulation.  [] (12172) Provided verbal/tactile cueing for activities related to improving balance, coordination, kinesthetic sense, posture, motor skill, proprioception  to assist with LE, proximal hip, and core control in self care, mobility, lifting, ambulation and eccentric single leg control. [address] and Therapeutic Activities:    [] (24732 or 09432) Provided verbal/tactile cueing for activities related to improving balance, coordination, kinesthetic sense, posture, motor skill, proprioception and motor activation to allow for proper function of core, proximal hip and LE with self care and ADLs and functional mobility.    [x] (56547) Gait Re-education- Provided training and instruction to the patient for proper LE, core and proximal hip recruitment and positioning and eccentric body weight control with ambulation re-education including up and down stairs     Home Exercise Program:    [x] (48366) Reviewed/Progressed HEP activities related to strengthening, flexibility, endurance, ROM of core, proximal hip and LE for functional self-care, mobility, lifting and ambulation/stair navigation   [] (76172)Reviewed/Progressed HEP activities related to improving balance, coordination, kinesthetic sense, posture, motor skill, proprioception of core, proximal hip and LE for self care, mobility, lifting, and ambulation/stair navigation      Manual Treatments:  PROM / STM / Oscillations-Mobs:  G-I, II, III, IV (PA's, Inf., Post.)  [] (41922) Provided manual therapy to mobilize LE, proximal hip and/or LS spine soft tissue/joints for the purpose of modulating pain, promoting relaxation,  increasing ROM, reducing/eliminating soft tissue swelling/inflammation/restriction, improving soft tissue extensibility and allowing for proper ROM for normal function with self care, mobility, lifting and ambulation. Charges:  Timed Code Treatment Minutes: 43   Total Treatment Minutes: 43      [] EVAL (LOW) 14402 (typically 20 minutes face-to-face)  [] EVAL (MOD) 49935 (typically 30 minutes face-to-face)  [] EVAL (HIGH) 51440 (typically 45 minutes face-to-face)  [] RE-EVAL     [x] AO(58995) x  2   [] Dry needle 1 or 2 Muscles (70321)  [] NMR (54584) x     [] Dry needle 3+ Muscles (68091)  [] Manual (43873) x     [] Ultrasound (57142) x  [x] TA (83864) x   1  [] Mech Traction (04994)  [] ES(attended) (66735)     [] ES (un) (64383):   [] Vasopump (05089)  [] Gait (98062)  [] Other:    GOALS:  Patient stated goal: \"Increase my strength\"  []? Progressing: [x]? Met: []? Not Met: []? Adjusted     Therapist goals for Patient:   Short Term Goals: To be achieved in: 2 weeks  1. Independent in HEP and progression per patient tolerance, in order to prevent re-injury. []? Progressing: [x]? Met: []?  Not Met: []? Adjusted  2. Patient will have a decrease in pain by 40% to facilitate improvement in movement, function, and ADLs as indicated by Functional Deficits. []? Progressing: [x]? Met: []? Not Met: []? Adjusted     Long Term Goals: To be achieved in: at discharge  1. Disability index score of 25% or less for the [facility] to assist with reaching prior level of function. []? Progressing: []? Met: []? Not Met: []? Adjusted  2. Patient will demonstrate increased AROM to 5-120 to allow for proper joint functioning as indicated by patients Functional Deficits. []? Progressing: [x]? Met: []? Not Met: []? Adjusted  3. Patient will demonstrate an increase in Strength to 4+/5 to allow for proper functional mobility as indicated by patients Functional Deficits. []? Progressing: [x]? Met: []? Not Met: []? Adjusted  4. Patient will return to going up and down stairs reciprocally without increased symptoms or restriction. []? Progressing: [x]? Met: []? Not Met: []? Adjusted  5. Patient will be able to ambulate in community without AD demonstrating good gait pattern. []? Progressing: [x]? Met: []? Not Met: []? Adjusted           ASSESSMENT:  Patient has met all therapy goals at this time. Patient has achieved excellent ROM. Strength will continue to improve with time and compliance with HEP. Patient is comfortable continuing on own with HEP at this time. Treatment/Activity Tolerance:  [x] Patient tolerated treatment well [] Patient limited by fatique  [] Patient limited by pain  [] Patient limited by other medical complications  [] Other:     Overall Progression Towards Functional goals/ Treatment Progress Update:  [x] Patient is progressing as expected towards functional goals listed. [] Progression is slowed due to complexities/Impairments listed. [] Progression has been slowed due to co-morbidities.   [] Plan just implemented, too soon to assess goals progression <30days   [] Goals require adjustment due to lack of

## 2021-04-16 ENCOUNTER — APPOINTMENT (OUTPATIENT)
Dept: PHYSICAL THERAPY | Age: 51
End: 2021-04-16
Payer: COMMERCIAL

## 2021-04-16 ENCOUNTER — PATIENT MESSAGE (OUTPATIENT)
Dept: ORTHOPEDIC SURGERY | Age: 51
End: 2021-04-16

## 2021-04-16 DIAGNOSIS — Z96.651 HISTORY OF TOTAL KNEE ARTHROPLASTY, RIGHT: ICD-10-CM

## 2021-04-16 RX ORDER — OXYCODONE HYDROCHLORIDE 5 MG/1
5 TABLET ORAL EVERY 8 HOURS PRN
Qty: 21 TABLET | Refills: 0 | Status: SHIPPED | OUTPATIENT
Start: 2021-04-16 | End: 2021-04-23

## 2021-04-19 ENCOUNTER — PATIENT MESSAGE (OUTPATIENT)
Dept: FAMILY MEDICINE CLINIC | Age: 51
End: 2021-04-19

## 2021-04-19 NOTE — TELEPHONE ENCOUNTER
From: Aruna Cortes  To: Smiley Burrows MD  Sent: 4/19/2021 12:40 PM EDT  Subject: Prescription Question    Good afternoon, sir. Today I start taking the 20 mg dose of Xarelto in the starter pack I was prescribed by the ER doctor when the blood clots in my legs were found. I have enough to continue on the full dose for 9 days before I will need a prescription for the 20mg dose so that I can continue taking the medication. Is that something you can prescribe for me? I'm still not 100% sure who I should be following up with as the diagnosis and prescription was done by an ER doctor.    [facility]

## 2021-04-27 ENCOUNTER — HOSPITAL ENCOUNTER (OUTPATIENT)
Dept: NON INVASIVE DIAGNOSTICS | Age: 51
Discharge: HOME | End: 2021-04-27
Payer: COMMERCIAL

## 2021-04-27 LAB
LV EF: 58 %
LVEF MODALITY: NORMAL

## 2021-04-27 PROCEDURE — 93306 TTE W/DOPPLER COMPLETE: CPT

## 2021-04-28 ENCOUNTER — VIRTUAL VISIT (OUTPATIENT)
Dept: FAMILY MEDICINE CLINIC | Age: 51
End: 2021-04-28
Payer: COMMERCIAL

## 2021-04-28 DIAGNOSIS — I82.463 ACUTE DEEP VEIN THROMBOSIS (DVT) OF CALF MUSCLE VEIN OF BOTH LOWER EXTREMITIES (HCC): ICD-10-CM

## 2021-04-28 DIAGNOSIS — Z86.73 HISTORY OF STROKE: ICD-10-CM

## 2021-04-28 DIAGNOSIS — Q21.12 PFO (PATENT FORAMEN OVALE): ICD-10-CM

## 2021-04-28 DIAGNOSIS — Z72.0 TOBACCO USE: ICD-10-CM

## 2021-04-28 DIAGNOSIS — G45.9 TIA (TRANSIENT ISCHEMIC ATTACK): Primary | ICD-10-CM

## 2021-04-28 PROCEDURE — 99214 OFFICE O/P EST MOD 30 MIN: CPT | Performed by: FAMILY MEDICINE

## 2021-04-28 NOTE — PROGRESS NOTES
 History of stroke: right insular cortex on June 8, 2014 (small PFO; hypergoag w/u neg,  neurology)    Gastroesophageal reflux disease with esophagitis--on daily ppi    Intractable chronic migraine without aura and with status migrainosus    Iron deficiency anemia    B12 deficiency    Malignant neoplasm of left kidney (HCC) clear cell. 8/1/18 Dr Molly Tam.  History of depression    History of alcoholism ([address]    History of total knee arthroplasty, right    Hematoma of right knee region    TIA (transient ischemic attack) LUE weakness 3/21    Tobacco use        Prior to Visit Medications    Medication Sig Taking? Authorizing Provider   rivaroxaban (XARELTO) 20 MG TABS tablet Take 1 tablet by mouth daily (with breakfast) Yes Jeff Giles MD   aspirin (ASPIRIN CHILDRENS) 81 MG chewable tablet Take 1 tablet by mouth daily Yes Jeff Giles MD   rivaroxaban (XARELTO STARTER PACK) 15 & 20 MG Starter Pack Take by mouth Yes Historical Provider, MD   atorvastatin (LIPITOR) 40 MG tablet Take 1 tablet by mouth nightly At bedtime Yes Jeff Giles MD   zolpidem (AMBIEN) 10 MG tablet Take 1 tablet by mouth nightly as needed (insomnia) for up to 90 days.  Yes Tiffanie R Kehrt, APRN - CNP   traZODone (DESYREL) 100 MG tablet Take 2 tablets by mouth nightly Yes Pelon Carlton MD   ondansetron (ZOFRAN-ODT) 4 MG disintegrating tablet Take 4 mg by mouth every 8 hours as needed Yes Historical Provider, MD   sildenafil (REVATIO) 20 MG tablet Take 4 tablets by mouth as needed (30 min prior to intercourse) Yes Jeff Giles MD   FLUoxetine (PROZAC) 20 MG capsule TAKE 1 CAPSULE BY MOUTH DAILY Yes Jeff Giles MD   pantoprazole (PROTONIX) 40 MG tablet Take 1 tablet by mouth 2 times daily Yes Jeff Giles MD   calcium-vitamin D (OSCAL 500/200 D-3) 500-200 MG-UNIT per tablet Take 1 tablet by mouth 2 times daily  Yes Historical Provider, MD   Multiple Vitamin TABS Take 1 tablet by mouth daily  Yes Historical Provider, MD       Social History     Tobacco Use    Smoking status: Current Every Day Smoker     Packs/day: 0.50     Years: 25.00     Pack years: 12.50     Types: Cigarettes    Smokeless tobacco: Never Used   Substance Use Topics    Alcohol use: No     Alcohol/week: 0.0 standard drinks     Comment: last drink 2015    Drug use: Yes     Types: Marijuana     Comment: medical marjiuanHotalot card            PHYSICAL EXAMINATION:  Physical Exam  Constitutional:       General: He is not in acute distress. Appearance: Normal appearance. He is not ill-appearing. HENT:      Head: Normocephalic and atraumatic. Neck:      Musculoskeletal: Normal range of motion. Pulmonary:      Effort: Pulmonary effort is normal.   Musculoskeletal: Normal range of motion. Skin:     Findings: No rash. Neurological:      General: No focal deficit present. Mental Status: He is alert and oriented to person, place, and time. Mental status is at baseline. Psychiatric:         Mood and Affect: Mood normal.         Behavior: Behavior normal.         Thought Content: Thought content normal.         Judgment: Judgment normal.          ASSESSMENT/PLAN:     Diagnosis Orders   1. TIA (transient ischemic attack) LUE weakness 3/21     2. History of stroke: right insular cortex on June 8, 2014 (small PFO; hypergoag w/u neg,  neurology)     3. Tobacco use     4. PFO (patent foramen ovale)     5.  Acute deep vein thrombosis (DVT) of calf muscle vein of both lower extremities (HCC)       He is to continue xarelto 20 mg/d for 3 months  Complete labs for hypercoag eval (genetic and autoimmune cause labs)  Consult with Terence Alston for PFO closure  Stop smoking  Continue asa/statin for stroke prevnetion (since it is not 395% certain that prior strokes were embolic and he has CV risk factors)    F/u 6/28 (end of his 3 months of anticoagulation)      Juana Barton is a 49 y.o. male being evaluated by a Virtual Visit (video visit) encounter to address concerns as mentioned above. A caregiver was present when appropriate. Due to this being a TeleHealth encounter ([facility]), evaluation of the following organ systems was limited: Vitals/Constitutional/EENT/Resp/CV/GI//MS/Neuro/Skin/Heme-Lymph-Imm. Pursuant to the emergency declaration under the [address] [place] [address] and the [employer] Act, this Virtual Visit was conducted with patient's (and/or legal guardian's) consent, to reduce the patient's risk of exposure to COVID-19 and provide necessary medical care. The patient (and/or legal guardian) has also been advised to contact this office for worsening conditions or problems, and seek emergency medical treatment and/or call 911 if deemed necessary. Patient identification was verified at the start of the visit: Yes      Services were provided through a video synchronous discussion virtually to substitute for in-person clinic visit. Patient and provider were located at their individual homes. --Smiley Burrows MD on 4/28/2021 at 8:08 AM    An electronic signature was used to authenticate this note.

## 2021-05-03 ENCOUNTER — TELEPHONE (OUTPATIENT)
Dept: CARDIOLOGY CLINIC | Age: 51
End: 2021-05-03

## 2021-05-03 PROCEDURE — 93272 ECG/REVIEW INTERPRET ONLY: CPT | Performed by: INTERNAL MEDICINE

## 2021-05-03 NOTE — TELEPHONE ENCOUNTER
Dr. Toma Crook reviewed event monitor results. Baseline rhythm is sinus rhythm. Frequent PACs and PVCs but no sustained arrhythmias or Afib. Patient reported symptoms with no correlation to arrhythmia. Called [facility] and reviewed results. He v/u. Will follow up with Dr. Rachel Mix as scheduled.

## 2021-05-06 ENCOUNTER — OFFICE VISIT (OUTPATIENT)
Dept: ORTHOPEDIC SURGERY | Age: 51
End: 2021-05-06

## 2021-05-06 VITALS — WEIGHT: 213 LBS | HEIGHT: 72 IN | BODY MASS INDEX: 28.85 KG/M2

## 2021-05-06 DIAGNOSIS — G45.9 TIA (TRANSIENT ISCHEMIC ATTACK): ICD-10-CM

## 2021-05-06 DIAGNOSIS — Z86.73 HISTORY OF CVA (CEREBROVASCULAR ACCIDENT): ICD-10-CM

## 2021-05-06 DIAGNOSIS — Z96.651 HISTORY OF TOTAL KNEE ARTHROPLASTY, RIGHT: Primary | ICD-10-CM

## 2021-05-06 PROCEDURE — 99024 POSTOP FOLLOW-UP VISIT: CPT | Performed by: ORTHOPAEDIC SURGERY

## 2021-05-06 NOTE — PROGRESS NOTES
[facility]  Office Visit    Chief Complaint: Follow-up s/p right total knee arthroplasty    HPI:  Eulalio Hein is a 49 y.o. who is here in follow-up of revision right total knee arthroplasty performed on 2/16/2021. He underwent incision and drainage of a hematoma on February 26, 2021 and again on March 5, 2021. He is doing very well in regards to his knee. He did recently have abdominal surgery for an adhesion causing a bowel obstruction. He was not taking any pain medications for his knee prior to this procedure. He rates the pain in his knee 0/10 today. He is very satisfied with the result so far. 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  Chronic pain syndrome    History of stroke: right insular cortex on June 8, 2014 (small PFO; hypergoag w/u neg,  neurology)    Gastroesophageal reflux disease with esophagitis--on daily ppi    Intractable chronic migraine without aura and with status migrainosus    Iron deficiency anemia    B12 deficiency    Malignant neoplasm of left kidney (HCC) clear cell. 8/1/18 Dr Onesimo Mario.     History of depression    [address] of [address] [address]    History of total knee arthroplasty, right    Hematoma of right knee region    TIA (transient ischemic attack) LUE weakness 3/21    Tobacco use    PFO (patent foramen ovale)    Acute deep vein thrombosis (DVT) of calf muscle vein of both lower extremities (HCC)       ROS:  Constitutional: denies fever, chills, weight loss  MSK: denies pain in other joints, muscle aches  Neurological: denies numbness, tingling, weakness    Exam: Height 6' (1.829 m), weight 213 lb (96.6 kg). Appearance: sitting in exam room chair, appears to be in no acute distress, awake and alert  Resp: unlabored breathing on room air  Skin: warm, dry and intact with out erythema or significant increased temperature  Neuro: grossly intact both lower extremities. Intact sensation to light touch. Motor exam 4+ to 5/5 in all major motor groups. Right knee: Anterior midline incision is healed. There is no erythema or drainage. Active knee range of motion 0-130 degrees. Knee is stable to varus valgus stress and full extension in the anterior posterior drawer at 90 degrees of flexion. Sensation is intact light touch. There is brisk capillary refill. Dorsalis pedis and posterior tibial pulses are intact. There is 5/5 muscle strength in all muscle groups. Imaging:  3 views of the right knee were performed and interpreted today. Significant for total knee arthroplasty prosthesis cemented in place with no signs of osteolysis, loosening, fracture, dislocation. Assessment:  S/p revision right total knee arthroplasty and status post I&D of hematoma    Plan:  He is doing well recovering from revision right total knee arthroplasty. He will continue activity as tolerated and home physical therapy exercises. We discussed with the patient today the use of antibiotic prophylaxis prior to any dental procedures. We discussed that the antibiotic prophylaxis would be used to help prevent periprosthetic joint infections. If they were not offered antibiotics by the physician performing the procedure, they should contact our office that we may prescribe them antibiotics. Follow-up on an annual basis in February 2022 or sooner if needed. This dictation was done with Dragon dictation and may contain mechanical errors related to translation.

## 2021-05-12 NOTE — PROGRESS NOTES
[facility]  Cardiology Consult    Alysha Hernandez  1970    May 13, 2021      Reason for Referral: PFO    CC: \"I had a TIA. \"      Subjective:     History of Present Illness:    Alysha Hernandez is a 49 y.o. patient with a PMH significant for nicotine addiction and CVA (2014). He was seen by his primary care provider 3/10/2021 after and episode of left hand weakness. An outpatient MRI and carotid doppler we ordered along with a referral to cardiology. He was evaluated in the emergency room on 3/28/10804 with leg pain nad was found to have a DVT and started on Xarelto. Today, he is here to discuss PFO. He denies any recurrent TIA symptoms today. He also recently had an abdominal surgery and has staples in place. Patient denies exertional chest pain/pressure, dyspnea at rest, SALAZAR, PND, orthopnea, palpitations, lightheadedness, weight changes, changes in LE edema, and syncope. Past Medical History:   has a past medical history of Alcoholism ([address] Allergic migraine with status migrainosus, Allergic rhinitis, seasonal, Anemia, Arthritis, Vaughn's esophagus, Benign intracranial hypertension, Cancer ([address] Carpal tunnel syndrome, Chronic pain, Depression, GERD (gastroesophageal reflux disease), Headache(784.0), History of blood transfusion, History of tobacco use, Migraine, Morbid obesity ([address] Movement disorder, Onychomycosis, Sleep apnea, obstructive, Unspecified cerebral artery occlusion with cerebral infarction, Use of cane as ambulatory aid, Wears dentures, and Wears glasses. Surgical History:   has a past surgical history that includes Gastric bypass surgery (Jan 2009); Splenectomy; LASIK (2005); knee surgery (July 2012); Carpal tunnel release; laparotomy; Knee arthroscopy (Right, 7/11/2013); Knee arthroscopy (Right, 7/11/12); Total knee arthroplasty (10/15/13); Endoscopy, colon, diagnostic; Dilatation, esophagus; eye surgery; joint replacement;  Abdominal exploration surgery (1/11/16); other surgical history (Left, 03/08/2016); hernia repair (3-); Upper gastrointestinal endoscopy (6-7-2016); other surgical history (2016); Upper gastrointestinal endoscopy (04/02/2018); Kidney removal (Left, 08/01/2018); Abdomen surgery; Abdomen surgery (4-7-2016); Revision total knee arthroplasty (Right, 12/17/2019); Revision total knee arthroplasty (Right, 1/22/2020); knee surgery (Right, 2/18/2020); Total knee arthroplasty (Right, 8/12/2020); Revision total knee arthroplasty (Right, 2/16/2021); knee surgery (Right, 2/26/2021); and knee surgery (Right, 3/5/2021). Social History:   reports that he has been smoking cigarettes. He has a 12.50 pack-year smoking history. He has never used smokeless tobacco. He reports current drug use. Drug: Marijuana. He reports that he does not drink alcohol. Family History:  family history includes Arthritis in his mother; Cancer in his father; Diabetes in his maternal uncle; Heart Disease in his maternal uncle and maternal uncle. Home Medications:  Were reviewed and are listed in nursing record and/or below  Prior to Admission medications    Medication Sig Start Date End Date Taking? Authorizing Provider   rivaroxaban (XARELTO) 20 MG TABS tablet Take 1 tablet by mouth daily (with breakfast) 4/19/21  Yes Birdie Pope MD   aspirin (ASPIRIN CHILDRENS) 81 MG chewable tablet Take 1 tablet by mouth daily 4/1/21  Yes Birdie Pope MD   rivaroxaban (XARELTO STARTER PACK) 15 & 20 MG Starter Pack Take by mouth   Yes Historical Provider, MD   atorvastatin (LIPITOR) 40 MG tablet Take 1 tablet by mouth nightly At bedtime 3/19/21  Yes Birdie Pope MD   zolpidem (AMBIEN) 10 MG tablet Take 1 tablet by mouth nightly as needed (insomnia) for up to 90 days.  3/19/21 6/17/21 Yes Tiffanie R Kehrt, APRN - CNP   traZODone (DESYREL) 100 MG tablet Take 2 tablets by mouth nightly 3/16/21  Yes Taya Watters MD   ondansetron (ZOFRAN-ODT) 4 MG disintegrating tablet Take 4 mg by mouth every 8 hours as needed 1/31/21  Yes Historical Provider, MD   sildenafil (REVATIO) 20 MG tablet Take 4 tablets by mouth as needed (30 min prior to intercourse) 1/11/21  Yes Vipul Chavez MD   FLUoxetine (PROZAC) 20 MG capsule TAKE 1 CAPSULE BY MOUTH DAILY 12/3/20  Yes Vipul Chavez MD   pantoprazole (PROTONIX) 40 MG tablet Take 1 tablet by mouth 2 times daily 2/28/20  Yes Vipul Chavez MD   calcium-vitamin D (OSCAL 500/200 D-3) 500-200 MG-UNIT per tablet Take 1 tablet by mouth 2 times daily    Yes Historical Provider, MD   Multiple Vitamin TABS Take 1 tablet by mouth daily    Yes Historical Provider, MD        CURRENT Medications:  No current facility-administered medications for this visit. Allergies:  Nsaids, Morphine, Prochlorperazine, Valtrex [valacyclovir hcl], Morphine and related, and Tolmetin           Review of Systems: SEE HPI   · Constitutional: no unanticipated weight loss. There's been no change in energy level, sleep pattern, or activity level. No fevers, chills. · Eyes: No visual changes or diplopia. No scleral icterus. · ENT: No Headaches, hearing loss or vertigo. No mouth sores or sore throat. · Cardiovascular: No Chest pain, tightness or discomfort.  No Shortness of breath. No Dyspnea on exertion, Orthopnea, Paroxysmal nocturnal dyspnea or breathlessness at rest.   No Palpitations.  No Syncope ('blackouts', 'faints', 'collapse') or dizziness. · Respiratory: No cough or wheezing, no sputum production. No hematemesis. · Gastrointestinal: No abdominal pain, appetite loss, blood in stools. No change in bowel or bladder habits. · Genitourinary: No dysuria, trouble voiding, or hematuria. · Musculoskeletal:  No gait disturbance, no joint complaints. · Integumentary: No rash or pruritis. · Neurological: No headache, diplopia, change in muscle strength, numbness or tingling. · Psychiatric: No anxiety or depression.   · Endocrine: No 03/26/2021    BUN 7 03/26/2021    CREATININE 1.0 03/26/2021    GFRAA >60 03/26/2021    AGRATIO 1.3 03/26/2021    LABGLOM >60 03/26/2021    GLUCOSE 99 03/26/2021    GLUCOSE 84 01/07/2015    PROT 5.9 03/26/2021    PROT 6.7 01/07/2015    CALCIUM 9.0 03/26/2021    BILITOT 0.3 03/26/2021    ALKPHOS 163 03/26/2021    AST 13 03/26/2021    ALT 7 03/26/2021     No results found for: [employer]  Lab Results   Component Value Date    LABA1C 5.1 02/09/2021     Lab Results   Component Value Date    CKTOTAL 56 09/07/2020    CKMB 0.9 06/18/2014    TROPONINI <0.01 03/26/2021       Cardiac, Vascular and Imaging Data all Personally Reviewed in Detail by Myself      EKG:     Echocardiogram:   ECHO 4/27/2021  Ejection fraction is visually estimated to be 55-60%. Normal diastolic function. Moderately dilated right ventricle. Right ventricular systolic function is normal .  A bubble study was performed and shows evidence of right to left shunting  consistent with a patent foramen ovale or atrial septal defect. Stress Test:     Cath: Other imaging:   MRI Brain 4/1/2021  1. No acute infarct or acute intracranial process identified. 2. Remote infarct involving the posterior right insular cortex and right   parietal lobe, unchanged. Carotid doppler 4/1/2021  Duplex sonography with color flow enhancement was performed bilaterally on    the cervical carotid system.    No evidence of hemodynamically significant stenosis in the bilateral carotid    and vertebral arteries.    Elevated velocities in the bilateral common carotid arteries with normal    waveforms being present; likely normal variant     30 day event monitor 3/30/2021  Baseline sinus rhythm     Assessment and Plan     CVA/TIA multiple episodes. Remote infarct involving the posterior right insular cortex and right parietal lobe. 30 day monitor did not show any arrhythmia.      PFO  Right to left shunting noted on the recent echocardiogram. I will schedule him for KRYSTAL to

## 2021-05-13 ENCOUNTER — OFFICE VISIT (OUTPATIENT)
Dept: CARDIOLOGY CLINIC | Age: 51
End: 2021-05-13
Payer: COMMERCIAL

## 2021-05-13 VITALS
HEIGHT: 72 IN | BODY MASS INDEX: 28.04 KG/M2 | HEART RATE: 83 BPM | SYSTOLIC BLOOD PRESSURE: 118 MMHG | OXYGEN SATURATION: 98 % | DIASTOLIC BLOOD PRESSURE: 70 MMHG | WEIGHT: 207 LBS

## 2021-05-13 DIAGNOSIS — Q21.12 PFO (PATENT FORAMEN OVALE): Primary | ICD-10-CM

## 2021-05-13 DIAGNOSIS — F17.219 CIGARETTE NICOTINE DEPENDENCE WITH NICOTINE-INDUCED DISORDER: ICD-10-CM

## 2021-05-13 DIAGNOSIS — G45.9 TIA (TRANSIENT ISCHEMIC ATTACK): ICD-10-CM

## 2021-05-13 DIAGNOSIS — I82.4Y9 ACUTE DEEP VEIN THROMBOSIS (DVT) OF PROXIMAL VEIN OF LOWER EXTREMITY, UNSPECIFIED LATERALITY (HCC): ICD-10-CM

## 2021-05-13 PROCEDURE — 99214 OFFICE O/P EST MOD 30 MIN: CPT | Performed by: INTERNAL MEDICINE

## 2021-05-13 NOTE — PATIENT INSTRUCTIONS
[facility] ORTHOPEDIC AND SPINE [facility]   The morning of your Procedure-KRYSTAL you will park in the hospital parking lot and report directly to the cath lab to check in. DATE: ____________ TIME: _____________      Pre-Procedure Instructions:  1. Do not eat or drink anything 8 hours before your procedure. 2. Hold all diabetic medications the morning of the procedure including, Metfomin. 3. If you take Lantus/Levemir only take ½ your normal dose the evening before. 4. All other medications can be taken in the morning with sips of water. 5. Do not hold anticoagulation therapy: warfarin, eliquis, xarelto therapy. 6. Do not use any lotions, creams or perfume the morning of procedure. 7. Please arrive 1 hour prior to procedure time. 8. Please have a responsible adult to drive you home after procedure. It is recommended you do not drive for 24 hours after procedure. 9. Cath lab will provide you with all post procedure instructions. If you have any questions regarding the procedure itself or medications please call [phone] and ask to speak with a nurse.

## 2021-05-17 ENCOUNTER — TELEPHONE (OUTPATIENT)
Dept: CARDIOLOGY CLINIC | Age: 51
End: 2021-05-17

## 2021-05-17 NOTE — TELEPHONE ENCOUNTER
[facility] ORTHOPEDIC AND SPINE \[facility]\"" AT [place]   The morning of your Procedure-KRYSTAL you will park in the hospital parking lot and report directly to the cath lab to check in. DATE: 6/1/2021 TIME: 12:30      Pre-Procedure Instructions:  1. Do not eat or drink anything 8 hours before your procedure. 2. Hold all diabetic medications the morning of the procedure including, Metfomin. 3. If you take Lantus/Levemir only take ½ your normal dose the evening before. 4. All other medications can be taken in the morning with sips of water. 5. Do not hold anticoagulation therapy: warfarin, eliquis, xarelto therapy. 6. Do not use any lotions, creams or perfume the morning of procedure. 7. Please arrive 1 hour prior to procedure time. 8. Please have a responsible adult to drive you home after procedure. It is recommended you do not drive for 24 hours after procedure. 9. Cath lab will provide you with all post procedure instructions. If you have any questions regarding the procedure itself or medications please call [phone] and ask to speak with a nurse. Published on [employer] and e-mail to Nga Gomez. Called and spoke to patient and let him know the date and time. Reviewed instructions and he verbalized understanding. Encouraged him to call with any questions or concerns.

## 2021-05-19 NOTE — TELEPHONE ENCOUNTER
Vero Mario called in this morning stating he needed to speak with Yakelin Leach RN about possibly rescheduling his KRYSTAL on 06/01.      You can reach Vero Mario at [phone]

## 2021-05-19 NOTE — TELEPHONE ENCOUNTER
Returned called to patient and he is not available for 6/1 due to another appointment that he has. He is okay to move to [facility] to the end of June. I have him rescheduled for 6/29 at 12:30. Published on [employer] and e-mail to Alejandro Covarrubias.

## 2021-05-24 ENCOUNTER — HOSPITAL ENCOUNTER (EMERGENCY)
Age: 51
Discharge: HOME | End: 2021-05-24
Payer: COMMERCIAL

## 2021-05-24 ENCOUNTER — APPOINTMENT (OUTPATIENT)
Dept: CT IMAGING | Age: 51
End: 2021-05-24
Payer: COMMERCIAL

## 2021-05-24 VITALS
WEIGHT: 208.5 LBS | SYSTOLIC BLOOD PRESSURE: 112 MMHG | HEIGHT: 72 IN | RESPIRATION RATE: 18 BRPM | DIASTOLIC BLOOD PRESSURE: 77 MMHG | BODY MASS INDEX: 28.24 KG/M2 | TEMPERATURE: 99.1 F | OXYGEN SATURATION: 96 % | HEART RATE: 65 BPM

## 2021-05-24 DIAGNOSIS — R10.9 ABDOMINAL PAIN, UNSPECIFIED ABDOMINAL LOCATION: Primary | ICD-10-CM

## 2021-05-24 LAB
A/G RATIO: 1.5 (ref 1.1–2.2)
ALBUMIN SERPL-MCNC: 3.7 G/DL (ref 3.4–5)
ALP BLD-CCNC: 115 U/L (ref 40–129)
ALT SERPL-CCNC: 10 U/L (ref 10–40)
ANION GAP SERPL CALCULATED.3IONS-SCNC: 11 MMOL/L (ref 3–16)
AST SERPL-CCNC: 14 U/L (ref 15–37)
BASOPHILS ABSOLUTE: 0.1 K/UL (ref 0–0.2)
BASOPHILS RELATIVE PERCENT: 1.4 %
BILIRUB SERPL-MCNC: <0.2 MG/DL (ref 0–1)
BILIRUBIN URINE: NEGATIVE
BLOOD, URINE: NEGATIVE
BUN BLDV-MCNC: 11 MG/DL (ref 7–20)
CALCIUM SERPL-MCNC: 8.9 MG/DL (ref 8.3–10.6)
CHLORIDE BLD-SCNC: 109 MMOL/L (ref 99–110)
CLARITY: CLEAR
CO2: 22 MMOL/L (ref 21–32)
COLOR: YELLOW
CREAT SERPL-MCNC: 0.9 MG/DL (ref 0.9–1.3)
EOSINOPHILS ABSOLUTE: 0.4 K/UL (ref 0–0.6)
EOSINOPHILS RELATIVE PERCENT: 5.9 %
GFR AFRICAN AMERICAN: >60
GFR NON-AFRICAN AMERICAN: >60
GLOBULIN: 2.5 G/DL
GLUCOSE BLD-MCNC: 88 MG/DL (ref 70–99)
GLUCOSE URINE: NEGATIVE MG/DL
HCT VFR BLD CALC: 31.2 % (ref 40.5–52.5)
HEMOGLOBIN: 10.2 G/DL (ref 13.5–17.5)
KETONES, URINE: NEGATIVE MG/DL
LEUKOCYTE ESTERASE, URINE: NEGATIVE
LIPASE: 51 U/L (ref 13–60)
LYMPHOCYTES ABSOLUTE: 2.8 K/UL (ref 1–5.1)
LYMPHOCYTES RELATIVE PERCENT: 41 %
MCH RBC QN AUTO: 27.2 PG (ref 26–34)
MCHC RBC AUTO-ENTMCNC: 32.7 G/DL (ref 31–36)
MCV RBC AUTO: 83.1 FL (ref 80–100)
MICROSCOPIC EXAMINATION: NORMAL
MONOCYTES ABSOLUTE: 0.4 K/UL (ref 0–1.3)
MONOCYTES RELATIVE PERCENT: 6.2 %
NEUTROPHILS ABSOLUTE: 3.1 K/UL (ref 1.7–7.7)
NEUTROPHILS RELATIVE PERCENT: 45.5 %
NITRITE, URINE: NEGATIVE
PDW BLD-RTO: 16.7 % (ref 12.4–15.4)
PH UA: 5.5 (ref 5–8)
PLATELET # BLD: 509 K/UL (ref 135–450)
PMV BLD AUTO: 7.5 FL (ref 5–10.5)
POTASSIUM REFLEX MAGNESIUM: 4.3 MMOL/L (ref 3.5–5.1)
PROTEIN UA: NEGATIVE MG/DL
RBC # BLD: 3.76 M/UL (ref 4.2–5.9)
SODIUM BLD-SCNC: 142 MMOL/L (ref 136–145)
SPECIFIC GRAVITY UA: >=1.03 (ref 1–1.03)
TOTAL PROTEIN: 6.2 G/DL (ref 6.4–8.2)
URINE REFLEX TO CULTURE: NORMAL
URINE TYPE: NORMAL
UROBILINOGEN, URINE: 1 E.U./DL
WBC # BLD: 6.8 K/UL (ref 4–11)

## 2021-05-24 PROCEDURE — 83690 ASSAY OF LIPASE: CPT

## 2021-05-24 PROCEDURE — 99283 EMERGENCY DEPT VISIT LOW MDM: CPT

## 2021-05-24 PROCEDURE — 96374 THER/PROPH/DIAG INJ IV PUSH: CPT

## 2021-05-24 PROCEDURE — 6360000002 HC RX W HCPCS: Performed by: PHYSICIAN ASSISTANT

## 2021-05-24 PROCEDURE — 36415 COLL VENOUS BLD VENIPUNCTURE: CPT

## 2021-05-24 PROCEDURE — 96375 TX/PRO/DX INJ NEW DRUG ADDON: CPT

## 2021-05-24 PROCEDURE — 6360000004 HC RX CONTRAST MEDICATION: Performed by: PHYSICIAN ASSISTANT

## 2021-05-24 PROCEDURE — 85025 COMPLETE CBC W/AUTO DIFF WBC: CPT

## 2021-05-24 PROCEDURE — 81003 URINALYSIS AUTO W/O SCOPE: CPT

## 2021-05-24 PROCEDURE — 80053 COMPREHEN METABOLIC PANEL: CPT

## 2021-05-24 PROCEDURE — 96376 TX/PRO/DX INJ SAME DRUG ADON: CPT

## 2021-05-24 PROCEDURE — 74177 CT ABD & PELVIS W/CONTRAST: CPT

## 2021-05-24 RX ORDER — ONDANSETRON 2 MG/ML
4 INJECTION INTRAMUSCULAR; INTRAVENOUS ONCE
Status: COMPLETED | OUTPATIENT
Start: 2021-05-24 | End: 2021-05-24

## 2021-05-24 RX ORDER — TRAMADOL HYDROCHLORIDE 50 MG/1
50 TABLET ORAL EVERY 6 HOURS PRN
Qty: 12 TABLET | Refills: 0 | Status: SHIPPED | OUTPATIENT
Start: 2021-05-24 | End: 2021-05-27

## 2021-05-24 RX ADMIN — HYDROMORPHONE HYDROCHLORIDE 0.5 MG: 1 INJECTION, SOLUTION INTRAMUSCULAR; INTRAVENOUS; SUBCUTANEOUS at 18:46

## 2021-05-24 RX ADMIN — ONDANSETRON 4 MG: 2 INJECTION INTRAMUSCULAR; INTRAVENOUS at 17:34

## 2021-05-24 RX ADMIN — HYDROMORPHONE HYDROCHLORIDE 0.5 MG: 1 INJECTION, SOLUTION INTRAMUSCULAR; INTRAVENOUS; SUBCUTANEOUS at 17:36

## 2021-05-24 RX ADMIN — IOPAMIDOL 100 ML: 755 INJECTION, SOLUTION INTRAVENOUS at 18:24

## 2021-05-24 ASSESSMENT — ENCOUNTER SYMPTOMS
CHOKING: 0
ABDOMINAL PAIN: 1
DIARRHEA: 0
NAUSEA: 0
BACK PAIN: 0
EYE DISCHARGE: 0
CONSTIPATION: 0
FACIAL SWELLING: 0
VOMITING: 0
EYE REDNESS: 0
BLOOD IN STOOL: 0
SHORTNESS OF BREATH: 0
APNEA: 0

## 2021-05-24 ASSESSMENT — PAIN DESCRIPTION - DESCRIPTORS: DESCRIPTORS: SHARP

## 2021-05-24 ASSESSMENT — PAIN SCALES - GENERAL
PAINLEVEL_OUTOF10: 7
PAINLEVEL_OUTOF10: 8

## 2021-05-24 ASSESSMENT — PAIN DESCRIPTION - ORIENTATION: ORIENTATION: RIGHT

## 2021-05-24 NOTE — ED NOTES
Patient ambulatory from ED. AVS provided and discussed with patient. All questions answered. Patient verbalizes understanding of discharge instructions. Respirations even and easy. No obvious distress at this time. Patient states his son dropped him off and will be picking him up. Patient discharged to [facility] to await ride.      Chet Akers RN  05/24/21 1941

## 2021-05-24 NOTE — ED PROVIDER NOTES
**ADVANCED PRACTICE PROVIDER, I HAVE EVALUATED THIS PATIENT**        [facility] ENCOUNTER      Pt Name: Leobardo Sweet  OLS:[id number]  [place] 1970  Date of evaluation: 5/24/2021  Provider: Devon Lara PA-C      Chief Complaint:    Chief Complaint   Patient presents with    Abdominal Pain     extensive abdominal hx, reports hx of hernias in the past, states this feels similarly. Patient reports also being 1 month post bowel obstruction with adhesion surgery. Nursing Notes, Past Medical Hx, Past Surgical Hx, Social Hx, Allergies, and Family Hx were all reviewed and agreed with or any disagreements were addressed in the HPI.    HPI: (Location, Duration, Timing, Severity, Quality, Assoc Sx, Context, Modifying factors)  This is a  49 y.o. male who presents with a Chief Complaint of right-sided abdominal pain for the last 3 weeks. He has chronic abdominal pain. Her last adhesions had multiple surgeries. Stated he also had a foot of his bowel removed in the past due to obstruction. He was seen surgeons at [address] as well as here with Dr. Ronny Steel. His last surgery was at [facility].  He denies diarrhea or constipation, no fever, denies passing blood or black tarry stool. No chest pain, no extremity pain or weakness. He does see pain management. Says he gets ketamine infusion. Denies fever. No other complaints.       PastMedical/Surgical History:      Diagnosis Date    Alcoholism [facility])     last drink 2015    Allergic migraine with status migrainosus     Allergic rhinitis, seasonal     Anemia     Arthritis     right knee    Vaughn's esophagus     Benign intracranial hypertension     Cancer (HCC)     renal     Carpal tunnel syndrome     Chronic pain     Depression     GERD (gastroesophageal reflux disease)     Headache(784.0)     History of blood transfusion     History of tobacco use     Quit 7/2014    Migraine chronic for 1 year    Morbid obesity ([address]     Movement disorder     Onychomycosis     Sleep apnea, obstructive     Severe uses cpap stop bang 6    Unspecified cerebral artery occlusion with cerebral infarction 2014    slight weakness in left arm    Use of cane as ambulatory aid     Wears dentures     full set    Wears glasses          Procedure Laterality Date    ABDOMEN SURGERY      gastric bypass    ABDOMEN SURGERY  4-7-2016    repair of recurrent incisional hernia with mesh, removal of old mesh, bilateral component separation    ABDOMINAL EXPLORATION SURGERY  1/11/16    exp lap, lysis of adhesions, small bowel resection    CARPAL TUNNEL RELEASE      bilat    DILATATION, ESOPHAGUS      ENDOSCOPY, COLON, DIAGNOSTIC      EYE SURGERY      GASTRIC BYPASS SURGERY  Jan 2009    Has lost about 200 pounds.  HERNIA REPAIR  3-    ventral    JOINT REPLACEMENT      KIDNEY REMOVAL Left 08/01/2018    KNEE ARTHROSCOPY Right 7/11/2013    Dr.Robert WaltersAdelina     KNEE ARTHROSCOPY Right 7/11/12    RIGHT KNEE ARTHROSCOPY WITH CHONDROPLASTY    KNEE SURGERY  July 2012    right, arthroscopy    KNEE SURGERY Right 2/18/2020    INCISION AND DRAINAGE RIGHT KNEE AND WOULD VAC PLACEMENT performed by Jenelle Ferrera MD at . [facility] Right 2/26/2021    INCISION AND DRAINAGE OF RIGHT KNEE HEMATOMA performed by Debi Ibrahim MD at . [facility] Right 3/5/2021    INCISION AND DRAINAGE AND CLOSURE RIGHT TOTAL KNEE performed by Debi Ibrahim MD at [address] [facility]  2005    OTHER SURGICAL HISTORY Left 03/08/2016    CT biopsy ablasion left kidney    OTHER SURGICAL HISTORY  2016    small intestine 12 inch removed, 2 hernia surgeries, another surgery to drain infection from incision.      REVISION TOTAL KNEE ARTHROPLASTY Right 12/17/2019    RIGHT REVISION TIBIA TOTAL KNEE REPLACEMENT performed by Jenelle Ferrera MD at [address] [facility] 1/22/2020    RIGHT KNEE IRRIGATION AND DEBRIDEMENT WITH POLY EXCHANGE performed by Antionette Ortega MD at [address] [facility] [address] Right 2/16/2021    RIGHT REMOVAL OF EXPLANT AND TOTAL KNEE REPLACEMENT performed by Nelsy Wood MD at [address] [facility] [address]  10/15/13    RIGHT    TOTAL KNEE ARTHROPLASTY Right 8/12/2020    RIGHT ARTHROPLASY  RESECTION WITH INSERTION OF SPACER performed by Delano Dudley MD at [facility]  6-7-2016    UPPER GASTROINTESTINAL ENDOSCOPY  04/02/2018       Medications:  Previous Medications    ASPIRIN (ASPIRIN CHILDRENS) 81 MG CHEWABLE TABLET    Take 1 tablet by mouth daily    ATORVASTATIN (LIPITOR) 40 MG TABLET    Take 1 tablet by mouth nightly At bedtime    CALCIUM-VITAMIN D (OSCAL 500/200 D-3) 500-200 MG-UNIT PER TABLET    Take 1 tablet by mouth 2 times daily     FLUOXETINE (PROZAC) 20 MG CAPSULE    TAKE 1 CAPSULE BY MOUTH DAILY    MULTIPLE VITAMIN TABS    Take 1 tablet by mouth daily     ONDANSETRON (ZOFRAN-ODT) 4 MG DISINTEGRATING TABLET    Take 4 mg by mouth every 8 hours as needed    PANTOPRAZOLE (PROTONIX) 40 MG TABLET    Take 1 tablet by mouth 2 times daily    RIVAROXABAN (XARELTO) 20 MG TABS TABLET    Take 1 tablet by mouth daily (with breakfast)    SILDENAFIL (REVATIO) 20 MG TABLET    Take 4 tablets by mouth as needed (30 min prior to intercourse)    TRAZODONE (DESYREL) 100 MG TABLET    Take 2 tablets by mouth nightly    ZOLPIDEM (AMBIEN) 10 MG TABLET    Take 1 tablet by mouth nightly as needed (insomnia) for up to 90 days. Review of Systems:  (2-9 systems needed)  Review of Systems   Constitutional: Negative for chills and fever. HENT: Negative for congestion, facial swelling and sneezing. Eyes: Negative for discharge and redness. Respiratory: Negative for apnea, choking and shortness of breath. Cardiovascular: Negative for chest pain.    Gastrointestinal: Positive for Reviewed   CBC WITH AUTO DIFFERENTIAL - Abnormal; Notable for the following components:       Result Value    RBC 3.76 (*)     Hemoglobin 10.2 (*)     Hematocrit 31.2 (*)     RDW 16.7 (*)     Platelets 323 (*)     All other components within normal limits    Narrative:     Performed at:  [facility]  [address] [place], [facility]   Phone [phone]   COMPREHENSIVE METABOLIC PANEL W/ REFLEX TO MG FOR LOW K - Abnormal; Notable for the following components: Total Protein 6.2 (*)     AST 14 (*)     All other components within normal limits    Narrative:     Performed at:  [facility]  [address], [facility]   Phone [phone]   LIPASE    Narrative:     Performed at:  [facility]  [address] [facility]   Phone [phone]   URINE RT REFLEX TO CULTURE    Narrative:     Performed at:  [facility]  [address], [facility]   Phone [phone] of labs reviewed and were negative at this time or not returned at the time of this note. RADIOLOGY:   Non-plain film images such as CT, Ultrasound and MRI are read by the radiologist. Ana Smith PA-C have directly visualized the radiologic plain film image(s) with the below findings:      Interpretation per the Radiologist below, if available at the time of this note:    CT ABDOMEN PELVIS W IV CONTRAST Additional Contrast? None   Final Result   No acute intra-abdominal abnormality. There is mild injection of the fat and skin thickening in the midline   supraumbilical region, likely due to postsurgical change. Correlate for any   signs of cellulitis      Diverticulosis.   Appendix is normal in caliber              Echo Complete    Result Date: 4/27/2021  Transthoracic Echocardiography Report (TTE) Peak Gradient: 4.84 mmHg    Mean Gradient: 3 mmHg  Area (continuity): 3.23 cm2  AV VTI: 22.1 cm  Aorta   Aortic Root: 3.6 cm  LVOT Diameter: 2.2 cm      XR KNEE RIGHT (3 VIEWS)    Result Date: 5/6/2021  Radiology exam is complete. No Radiologist dictation. Please follow up with ordering provider. MEDICAL DECISION MAKING / ED COURSE:      PROCEDURES:   Procedures    None    Patient was given:  Medications   HYDROmorphone (DILAUDID) injection 0.5 mg (0.5 mg Intravenous Given 5/24/21 1736)   ondansetron (ZOFRAN) injection 4 mg (4 mg Intravenous Given 5/24/21 1734)   iopamidol (ISOVUE-370) 76 % injection 100 mL (100 mLs Intravenous Given 5/24/21 1824)   HYDROmorphone (DILAUDID) injection 0.5 mg (0.5 mg Intravenous Given 5/24/21 1846)       Emergency room course: Patient on exam pupils are equal round and reactive to light extraocular movement is intact. Throat is clear. Cardiovascular regular rate rhythm, lungs are clear. No wheeze rales or rhonchi. No chest wall tenderness. Abdomen is soft some right-sided mid tenderness with palpation. No rebound or guarding. Does have suture line in place in the mid abdomen there is some scarring palpable underneath the suture. He has no guarding noted no rebound. No CVA or flank tenderness. Full range of motion all extremity alert oriented x4. Does not appear to be in acute distress. Lab results from today shows CBC with a white count of 6.8, RBC 3.76, hemoglobin 10.2 hematocrit 31.2. CMP shows sodium 142, potassium 4.3, chloride 109 BUN 11 creatinine 0.9. Normal transaminases. Lipase 51.0. Urinalysis negative leukocytes, negative for nitrates, negative for blood, negative for ketones. CT abdomen and pelvis with IV contrast shows no acute intra-abdominal abnormality. Does show there is a mild injection of the fat and skin thickening in the midline supraumbilical region likely due to postsurgical changes.   Correlate for any signs of cellulitis. Patient does have some noticeable thickening of the skin underneath the scar tissue. There is no surrounding erythema no active drainage from the suture line. No warmth. No sign of cellulitis. I did discuss that with the patient as well as well as his CT and lab results. I will put him on a few Norco for breakthrough pain for home. Follow-up with his surgeon. Return for any worsening. He has understood discharge plan he will be discharged stable condition. The patient tolerated their visit well. I evaluated the patient. The physician was available for consultation as needed. The patient and / or the family were informed of the results of any tests, a time was given to answer questions, a plan was proposed and they agreed with plan. CLINICAL IMPRESSION:  1. Abdominal pain, unspecified abdominal location        DISPOSITION  DISPOSITION Decision To Discharge 05/24/2021 07:26:14 PM          PATIENT REFERRED TO:  Cj Jackson MD  [address] [place] [phone]    Call   As needed, If symptoms worsen      DISCHARGE MEDICATIONS:  New Prescriptions    TRAMADOL (ULTRAM) 50 MG TABLET    Take 1 tablet by mouth every 6 hours as needed for Pain (MAY TAKE 2 TABS EVERY 6 HOURS FOR SEVERE PAIN) for up to 3 days.        DISCONTINUED MEDICATIONS:  Discontinued Medications    No medications on file              (Please note the MDM and HPI sections of this note were completed with a voice recognition program.  Efforts were made to edit the dictations but occasionally words are mis-transcribed.)    Electronically signed, Lesia Chan PA-C,           Lesia Chan PA-C  05/24/21 1928       Lesia Chan PA-C  05/24/21 1929

## 2021-05-25 ENCOUNTER — CARE COORDINATION (OUTPATIENT)
Dept: CARE COORDINATION | Age: 51
End: 2021-05-25

## 2021-05-25 NOTE — CARE COORDINATION
Patient is eligable for ambulatory care enrollment, but denies needs at this time. He did accept this [facility]'s contact information for future needs. Educated patient about risk for severe COVID-19 due to risk factors according to CDC guidelines. ACM reviewed discharge instructions, medical action plan and red flag symptoms patient who verbalized understanding. Discussed COVID vaccination status Yes. Education provided on COVID-19 vaccination as appropriate. Discussed exposure protocols and quarantine with CDC Guidelines. Patient was given an opportunity to verbalize any questions and concerns and agrees to contact ACM or health care provider for questions related to their healthcare.

## 2021-06-03 RX ORDER — TRAZODONE HYDROCHLORIDE 100 MG/1
TABLET ORAL
Qty: 180 TABLET | Refills: 0 | OUTPATIENT
Start: 2021-06-03

## 2021-06-07 DIAGNOSIS — F51.04 PSYCHOPHYSIOLOGICAL INSOMNIA: Chronic | ICD-10-CM

## 2021-06-07 RX ORDER — ZOLPIDEM TARTRATE 10 MG/1
10 TABLET ORAL NIGHTLY PRN
Qty: 90 TABLET | Refills: 1 | Status: SHIPPED | OUTPATIENT
Start: 2021-06-07 | End: 2021-09-05

## 2021-06-07 NOTE — TELEPHONE ENCOUNTER
LAST OFFICE VISIT 03/19/2021  NEXT OFFICE VISIT  09/24/2021    Last refill 03/19/2021 for  #90    Spoke with patient. He does have a refill at [facility] but he will have to pay full price for the refill. If he gets it filled at [facility] he will just have to pay for his copay. Unable to get compliance report at this time.

## 2021-06-11 ENCOUNTER — OFFICE VISIT (OUTPATIENT)
Dept: FAMILY MEDICINE CLINIC | Age: 51
End: 2021-06-11
Payer: COMMERCIAL

## 2021-06-11 VITALS
HEART RATE: 72 BPM | SYSTOLIC BLOOD PRESSURE: 114 MMHG | HEIGHT: 72 IN | DIASTOLIC BLOOD PRESSURE: 70 MMHG | OXYGEN SATURATION: 98 % | BODY MASS INDEX: 27.27 KG/M2 | WEIGHT: 201.38 LBS | TEMPERATURE: 98.1 F | RESPIRATION RATE: 14 BRPM

## 2021-06-11 DIAGNOSIS — R61 NIGHT SWEATS: ICD-10-CM

## 2021-06-11 DIAGNOSIS — D50.9 IRON DEFICIENCY ANEMIA, UNSPECIFIED IRON DEFICIENCY ANEMIA TYPE: ICD-10-CM

## 2021-06-11 DIAGNOSIS — R61 NIGHT SWEATS: Primary | ICD-10-CM

## 2021-06-11 LAB — TSH REFLEX: 0.31 UIU/ML (ref 0.27–4.2)

## 2021-06-11 PROCEDURE — 99213 OFFICE O/P EST LOW 20 MIN: CPT | Performed by: NURSE PRACTITIONER

## 2021-06-11 ASSESSMENT — ENCOUNTER SYMPTOMS
SHORTNESS OF BREATH: 0
COUGH: 0
ABDOMINAL PAIN: 0

## 2021-06-11 NOTE — PROGRESS NOTES
6/11/2021    This is a 49 y.o. male   Chief Complaint   Patient presents with    Other     Patient copmplaining of night sweats x3 weeks. He wakes up with his T-shirt and hair soaked   . HPI  Patient reports that she has been having night sweats that started about 3 weeks ago. He is not having nightly. Happening most nights of the week. He has his a/c on at 72 at night and has ceiling fan running. He will wake up because he needs to urinate. Sleeps in under shirt and thin shorts. When he wakes up pillow and clothes will be damp. Not associated with any other symptoms. Denies chest pain,shortness of breath, heart palpitations, vertigo, fever. Has not had diet or medication changes. Following with Dr. Katerine Daniel for anemia. Had to complete three iron infusions one week apart. Last infusion was on 6/1/21. Also gets vitamin B12 injections. Has had history of gastric bypass. Since iron infusions has noticed anemia has improved. Has history of skin infection on abd incision. This did drain and now has hard lump. Drainage happened weeks ago. Not sure if this is when the night sweats started. Denies fever. 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  Chronic pain syndrome    History of stroke: right insular cortex on June 8, 2014 (small PFO; hypergoag w/u neg,  neurology)    Gastroesophageal reflux disease with esophagitis--on daily ppi    Intractable chronic migraine without aura and with status migrainosus    Iron deficiency anemia    B12 deficiency    Malignant neoplasm of left kidney (HCC) clear cell.  8/1/18  Yesika.  History of depression    History of alcoholism ([facility] [address]    History of total knee arthroplasty, right    Hematoma of right knee region    TIA (transient ischemic attack) LUE weakness 3/21    Tobacco use    PFO (patent foramen ovale)    Acute deep vein thrombosis (DVT) of calf muscle vein of both lower extremities (HCC)          Current Outpatient Medications   Medication Sig Dispense Refill    zolpidem (AMBIEN) 10 MG tablet Take 1 tablet by mouth nightly as needed (insomnia) for up to 90 days. 90 tablet 1    rivaroxaban (XARELTO) 20 MG TABS tablet Take 1 tablet by mouth daily (with breakfast) 30 tablet 1    aspirin (ASPIRIN CHILDRENS) 81 MG chewable tablet Take 1 tablet by mouth daily  0    atorvastatin (LIPITOR) 40 MG tablet Take 1 tablet by mouth nightly At bedtime 90 tablet 1    traZODone (DESYREL) 100 MG tablet Take 2 tablets by mouth nightly 180 tablet 0    ondansetron (ZOFRAN-ODT) 4 MG disintegrating tablet Take 4 mg by mouth every 8 hours as needed      sildenafil (REVATIO) 20 MG tablet Take 4 tablets by mouth as needed (30 min prior to intercourse) 30 tablet 0    FLUoxetine (PROZAC) 20 MG capsule TAKE 1 CAPSULE BY MOUTH DAILY 90 capsule 1    pantoprazole (PROTONIX) 40 MG tablet Take 1 tablet by mouth 2 times daily      calcium-vitamin D (OSCAL 500/200 D-3) 500-200 MG-UNIT per tablet Take 1 tablet by mouth 2 times daily       Multiple Vitamin TABS Take 1 tablet by mouth daily        No current facility-administered medications for this visit. Allergies   Allergen Reactions    Nsaids Nausea Only and Other (See Comments)     Hx of Barretts esophagus      Morphine Hives and Itching     Pt gets Hives/itching.  Does not tolerate     Prochlorperazine Other (See Comments)     No allergic reaction, patient reported sense of \"restlessness\" and fidgiting    Valtrex [Valacyclovir Hcl] Diarrhea    Morphine And Related Hives and Itching    Tolmetin Nausea Only     Hx of Barretts esophagus       Review of Systems   Constitutional: Positive for diaphoresis. Negative for activity change and fever. Respiratory: Negative for cough and shortness of breath. Cardiovascular: Negative for chest pain, palpitations and leg swelling. Gastrointestinal: Negative for abdominal pain. Genitourinary: Negative for difficulty urinating, dysuria, frequency and hematuria. Neurological: Negative for dizziness and syncope. Vitals:    06/11/21 0941   BP: 114/70   Site: Right Upper Arm   Position: Sitting   Pulse: 72   Resp: 14   Temp: 98.1 °F (36.7 °C)   SpO2: 98%   Weight: 201 lb 6 oz (91.3 kg)   Height: 6' (1.829 m)       Body mass index is 27.31 kg/m². Wt Readings from Last 3 Encounters:   06/11/21 201 lb 6 oz (91.3 kg)   05/24/21 208 lb 8 oz (94.6 kg)   05/13/21 207 lb (93.9 kg)       BP Readings from Last 3 Encounters:   06/11/21 114/70   05/24/21 112/77   05/13/21 118/70       Physical Exam  Vitals and nursing note reviewed. Constitutional:       General: He is not in acute distress. Appearance: He is well-developed. HENT:      Head: Normocephalic and atraumatic. Cardiovascular:      Rate and Rhythm: Normal rate and regular rhythm. Comments: Soft SAULO  Pulmonary:      Effort: Pulmonary effort is normal. No respiratory distress. Breath sounds: Normal breath sounds. Abdominal:      Palpations: Abdomen is soft. Tenderness: There is no abdominal tenderness. Comments: Midline surgical scar. Top of scar with small sore that has crusted over. No surrounding erythema. No drainage noted. Musculoskeletal:      Cervical back: Neck supple. Right lower leg: No edema. Left lower leg: No edema. Comments: Randy lower extremities non tender to palpation. Skin:     General: Skin is warm and dry. Neurological:      Mental Status: He is alert and oriented to person, place, and time.    Psychiatric:         Behavior: Behavior normal.         Thought Content: Thought content normal.         Judgment: Judgment normal.         Assessmentand Plan  Chao Estes was seen today. Diagnoses and all orders for this visit:    Night sweats  -     CBC Auto Differential; Future  -     TSH with Reflex; Future  Advised to keep a/c cool at night to help with symptoms. Discussed that he could be having a possible side effect of the xarelto. He is coming up on the end of his three month treatment course. He has a f/u next month with Dr. Kelin Parra to discuss and evaluate if he would be able to stop medication. Iron deficiency anemia, unspecified iron deficiency anemia type  -     CBC Auto Differential; Future  Has completed 3 iron infusions. Continue f/u with hematologist Dr. Moreno Godinez. Return in about 26 days (around 7/7/2021), or if symptoms worsen or fail to improve, for chronic conditions, with Dr. Kelin Parra.

## 2021-06-12 LAB
ACANTHOCYTES: ABNORMAL
BASOPHILS ABSOLUTE: 0.3 K/UL (ref 0–0.2)
BASOPHILS RELATIVE PERCENT: 6 %
CRENATED RBC'S: ABNORMAL
EOSINOPHILS ABSOLUTE: 0.4 K/UL (ref 0–0.6)
EOSINOPHILS RELATIVE PERCENT: 8 %
HCT VFR BLD CALC: 37.2 % (ref 40.5–52.5)
HEMATOLOGY PATH CONSULT: YES
HEMOGLOBIN: 12.4 G/DL (ref 13.5–17.5)
HYPOCHROMIA: ABNORMAL
LYMPHOCYTES ABSOLUTE: 2 K/UL (ref 1–5.1)
LYMPHOCYTES RELATIVE PERCENT: 40 %
MCH RBC QN AUTO: 28.2 PG (ref 26–34)
MCHC RBC AUTO-ENTMCNC: 33.2 G/DL (ref 31–36)
MCV RBC AUTO: 84.9 FL (ref 80–100)
MONOCYTES ABSOLUTE: 0.4 K/UL (ref 0–1.3)
MONOCYTES RELATIVE PERCENT: 8 %
NEUTROPHILS ABSOLUTE: 1.9 K/UL (ref 1.7–7.7)
NEUTROPHILS RELATIVE PERCENT: 38 %
PDW BLD-RTO: 19.9 % (ref 12.4–15.4)
PLATELET # BLD: 381 K/UL (ref 135–450)
PMV BLD AUTO: 9.1 FL (ref 5–10.5)
RBC # BLD: 4.38 M/UL (ref 4.2–5.9)
TARGET CELLS: ABNORMAL
WBC # BLD: 5.1 K/UL (ref 4–11)

## 2021-06-14 LAB — HEMATOLOGY PATH CONSULT: NORMAL

## 2021-06-22 ENCOUNTER — HOSPITAL ENCOUNTER (EMERGENCY)
Age: 51
Discharge: HOME | End: 2021-06-23
Attending: EMERGENCY MEDICINE
Payer: COMMERCIAL

## 2021-06-22 ENCOUNTER — APPOINTMENT (OUTPATIENT)
Dept: CT IMAGING | Age: 51
End: 2021-06-22
Payer: COMMERCIAL

## 2021-06-22 VITALS
BODY MASS INDEX: 28.7 KG/M2 | WEIGHT: 211.9 LBS | TEMPERATURE: 98.3 F | DIASTOLIC BLOOD PRESSURE: 74 MMHG | RESPIRATION RATE: 18 BRPM | HEART RATE: 87 BPM | SYSTOLIC BLOOD PRESSURE: 120 MMHG | OXYGEN SATURATION: 94 % | HEIGHT: 72 IN

## 2021-06-22 DIAGNOSIS — R11.0 NAUSEA: ICD-10-CM

## 2021-06-22 DIAGNOSIS — R10.9 ABDOMINAL PAIN, UNSPECIFIED ABDOMINAL LOCATION: Primary | ICD-10-CM

## 2021-06-22 LAB
A/G RATIO: 1.7 (ref 1.1–2.2)
ACANTHOCYTES: ABNORMAL
ALBUMIN SERPL-MCNC: 3.8 G/DL (ref 3.4–5)
ALP BLD-CCNC: 97 U/L (ref 40–129)
ALT SERPL-CCNC: 7 U/L (ref 10–40)
AMPHETAMINE SCREEN, URINE: POSITIVE
ANION GAP SERPL CALCULATED.3IONS-SCNC: 9 MMOL/L (ref 3–16)
AST SERPL-CCNC: 10 U/L (ref 15–37)
ATYPICAL LYMPHOCYTE RELATIVE PERCENT: 2 % (ref 0–6)
BANDED NEUTROPHILS RELATIVE PERCENT: 1 % (ref 0–7)
BARBITURATE SCREEN URINE: ABNORMAL
BASOPHILS ABSOLUTE: 0 K/UL (ref 0–0.2)
BASOPHILS RELATIVE PERCENT: 0 %
BENZODIAZEPINE SCREEN, URINE: ABNORMAL
BILIRUB SERPL-MCNC: <0.2 MG/DL (ref 0–1)
BILIRUBIN URINE: NEGATIVE
BLOOD, URINE: NEGATIVE
BUN BLDV-MCNC: 12 MG/DL (ref 7–20)
BURR CELLS: ABNORMAL
CALCIUM SERPL-MCNC: 8.8 MG/DL (ref 8.3–10.6)
CANNABINOID SCREEN URINE: ABNORMAL
CHLORIDE BLD-SCNC: 108 MMOL/L (ref 99–110)
CLARITY: CLEAR
CO2: 20 MMOL/L (ref 21–32)
COCAINE METABOLITE SCREEN URINE: ABNORMAL
COLOR: YELLOW
CREAT SERPL-MCNC: 0.9 MG/DL (ref 0.9–1.3)
EOSINOPHILS ABSOLUTE: 0.4 K/UL (ref 0–0.6)
EOSINOPHILS RELATIVE PERCENT: 5 %
GFR AFRICAN AMERICAN: >60
GFR NON-AFRICAN AMERICAN: >60
GLOBULIN: 2.3 G/DL
GLUCOSE BLD-MCNC: 91 MG/DL (ref 70–99)
GLUCOSE URINE: NEGATIVE MG/DL
HCT VFR BLD CALC: 35.1 % (ref 40.5–52.5)
HEMOGLOBIN: 11.5 G/DL (ref 13.5–17.5)
KETONES, URINE: NEGATIVE MG/DL
LEUKOCYTE ESTERASE, URINE: NEGATIVE
LIPASE: 40 U/L (ref 13–60)
LYMPHOCYTES ABSOLUTE: 3 K/UL (ref 1–5.1)
LYMPHOCYTES RELATIVE PERCENT: 39 %
Lab: ABNORMAL
MCH RBC QN AUTO: 28.2 PG (ref 26–34)
MCHC RBC AUTO-ENTMCNC: 32.6 G/DL (ref 31–36)
MCV RBC AUTO: 86.4 FL (ref 80–100)
METHADONE SCREEN, URINE: ABNORMAL
MICROSCOPIC EXAMINATION: NORMAL
MONOCYTES ABSOLUTE: 0.6 K/UL (ref 0–1.3)
MONOCYTES RELATIVE PERCENT: 9 %
NEUTROPHILS ABSOLUTE: 3.2 K/UL (ref 1.7–7.7)
NEUTROPHILS RELATIVE PERCENT: 44 %
NITRITE, URINE: NEGATIVE
OPIATE SCREEN URINE: ABNORMAL
OXYCODONE URINE: ABNORMAL
PDW BLD-RTO: 22.4 % (ref 12.4–15.4)
PH UA: 5.5
PH UA: 5.5 (ref 5–8)
PHENCYCLIDINE SCREEN URINE: ABNORMAL
PLATELET # BLD: 321 K/UL (ref 135–450)
PMV BLD AUTO: 8.8 FL (ref 5–10.5)
POIKILOCYTES: ABNORMAL
POTASSIUM REFLEX MAGNESIUM: 4.4 MMOL/L (ref 3.5–5.1)
PROPOXYPHENE SCREEN: ABNORMAL
PROTEIN UA: NEGATIVE MG/DL
RBC # BLD: 4.06 M/UL (ref 4.2–5.9)
SCHISTOCYTES: ABNORMAL
SODIUM BLD-SCNC: 137 MMOL/L (ref 136–145)
SPECIFIC GRAVITY UA: >=1.03 (ref 1–1.03)
TARGET CELLS: ABNORMAL
TOTAL PROTEIN: 6.1 G/DL (ref 6.4–8.2)
URINE TYPE: NORMAL
UROBILINOGEN, URINE: 0.2 E.U./DL
WBC # BLD: 7.2 K/UL (ref 4–11)

## 2021-06-22 PROCEDURE — 96376 TX/PRO/DX INJ SAME DRUG ADON: CPT

## 2021-06-22 PROCEDURE — 99283 EMERGENCY DEPT VISIT LOW MDM: CPT

## 2021-06-22 PROCEDURE — 6360000002 HC RX W HCPCS: Performed by: EMERGENCY MEDICINE

## 2021-06-22 PROCEDURE — 85025 COMPLETE CBC W/AUTO DIFF WBC: CPT

## 2021-06-22 PROCEDURE — 36415 COLL VENOUS BLD VENIPUNCTURE: CPT

## 2021-06-22 PROCEDURE — 6360000004 HC RX CONTRAST MEDICATION: Performed by: EMERGENCY MEDICINE

## 2021-06-22 PROCEDURE — 93005 ELECTROCARDIOGRAM TRACING: CPT | Performed by: EMERGENCY MEDICINE

## 2021-06-22 PROCEDURE — 83690 ASSAY OF LIPASE: CPT

## 2021-06-22 PROCEDURE — 80053 COMPREHEN METABOLIC PANEL: CPT

## 2021-06-22 PROCEDURE — 81003 URINALYSIS AUTO W/O SCOPE: CPT

## 2021-06-22 PROCEDURE — 96375 TX/PRO/DX INJ NEW DRUG ADDON: CPT

## 2021-06-22 PROCEDURE — 6370000000 HC RX 637 (ALT 250 FOR IP): Performed by: EMERGENCY MEDICINE

## 2021-06-22 PROCEDURE — 2580000003 HC RX 258: Performed by: EMERGENCY MEDICINE

## 2021-06-22 PROCEDURE — 96374 THER/PROPH/DIAG INJ IV PUSH: CPT

## 2021-06-22 PROCEDURE — 74177 CT ABD & PELVIS W/CONTRAST: CPT

## 2021-06-22 PROCEDURE — 80307 DRUG TEST PRSMV CHEM ANLYZR: CPT

## 2021-06-22 RX ORDER — ONDANSETRON 2 MG/ML
4 INJECTION INTRAMUSCULAR; INTRAVENOUS EVERY 30 MIN PRN
Status: DISCONTINUED | OUTPATIENT
Start: 2021-06-22 | End: 2021-06-23 | Stop reason: HOSPADM

## 2021-06-22 RX ORDER — OXYCODONE HYDROCHLORIDE AND ACETAMINOPHEN 5; 325 MG/1; MG/1
1 TABLET ORAL ONCE
Status: COMPLETED | OUTPATIENT
Start: 2021-06-22 | End: 2021-06-22

## 2021-06-22 RX ORDER — 0.9 % SODIUM CHLORIDE 0.9 %
1000 INTRAVENOUS SOLUTION INTRAVENOUS ONCE
Status: COMPLETED | OUTPATIENT
Start: 2021-06-22 | End: 2021-06-22

## 2021-06-22 RX ORDER — ONDANSETRON 4 MG/1
4 TABLET, ORALLY DISINTEGRATING ORAL EVERY 8 HOURS PRN
Qty: 20 TABLET | Refills: 0 | Status: SHIPPED | OUTPATIENT
Start: 2021-06-22 | End: 2021-06-29

## 2021-06-22 RX ORDER — OXYCODONE HYDROCHLORIDE AND ACETAMINOPHEN 5; 325 MG/1; MG/1
1 TABLET ORAL EVERY 6 HOURS PRN
Qty: 12 TABLET | Refills: 0 | Status: SHIPPED | OUTPATIENT
Start: 2021-06-22 | End: 2021-08-06 | Stop reason: SDUPTHER

## 2021-06-22 RX ADMIN — HYDROMORPHONE HYDROCHLORIDE 1 MG: 1 INJECTION, SOLUTION INTRAMUSCULAR; INTRAVENOUS; SUBCUTANEOUS at 22:22

## 2021-06-22 RX ADMIN — SODIUM CHLORIDE 1000 ML: 9 INJECTION, SOLUTION INTRAVENOUS at 20:52

## 2021-06-22 RX ADMIN — OXYCODONE HYDROCHLORIDE AND ACETAMINOPHEN 1 TABLET: 5; 325 TABLET ORAL at 23:45

## 2021-06-22 RX ADMIN — ONDANSETRON 4 MG: 2 INJECTION INTRAMUSCULAR; INTRAVENOUS at 20:49

## 2021-06-22 RX ADMIN — IOPAMIDOL 100 ML: 755 INJECTION, SOLUTION INTRAVENOUS at 21:47

## 2021-06-22 RX ADMIN — HYDROMORPHONE HYDROCHLORIDE 1 MG: 1 INJECTION, SOLUTION INTRAMUSCULAR; INTRAVENOUS; SUBCUTANEOUS at 20:48

## 2021-06-22 ASSESSMENT — PAIN SCALES - GENERAL
PAINLEVEL_OUTOF10: 8
PAINLEVEL_OUTOF10: 10
PAINLEVEL_OUTOF10: 5

## 2021-06-22 ASSESSMENT — PAIN DESCRIPTION - FREQUENCY: FREQUENCY: CONTINUOUS

## 2021-06-22 ASSESSMENT — PAIN DESCRIPTION - ONSET: ONSET: SUDDEN

## 2021-06-22 ASSESSMENT — PAIN DESCRIPTION - LOCATION: LOCATION: ABDOMEN

## 2021-06-22 ASSESSMENT — PAIN DESCRIPTION - DESCRIPTORS: DESCRIPTORS: ACHING;SHARP

## 2021-06-22 ASSESSMENT — PAIN DESCRIPTION - ORIENTATION: ORIENTATION: RIGHT

## 2021-06-22 NOTE — ED PROVIDER NOTES
Emergency Physician Note  [address] [address] [address] [facility] [address]  Dept: [phone]  Loc: [phone]  Open Note Time:  8:08 PM EDT    Chief Complaint  Abdominal Pain (exacerbation of chronic abd pain x3 days, RLQ, 8/10)     History of Present Illness  Mirtha Ambrose is a 49 y.o. male  has a past medical history of Alcoholism ([address] Allergic migraine with status migrainosus, Allergic rhinitis, seasonal, Anemia, Arthritis, Vaughn's esophagus, Benign intracranial hypertension, Cancer ([address] Carpal tunnel syndrome, Chronic pain, Depression, GERD (gastroesophageal reflux disease), Headache(784.0), History of blood transfusion, History of tobacco use, Migraine, Morbid obesity ([address] Movement disorder, Onychomycosis, Sleep apnea, obstructive, Unspecified cerebral artery occlusion with cerebral infarction, Use of cane as ambulatory aid, Wears dentures, and Wears glasses. who presents to the ED for aminal pain. Patient does frequently get abdominal pain secondary to multiple surgeries he had in the past however normally his stomach pain is in the middle. For that since Sunday he has had right sided abdominal pain more upper than lower. He states he describes it as sharp and achy that wax and wanes in intensity. He is able to swallow water however has lost his appetite. He has been feeling nauseous however has not vomited. He states he has been having normal bowel movement throughout these past several days. Patient has been vaccinated against COVID-19    Denies fever,  chest pain, shortness of breath, cough,  vomiting, diarrhea, headache,   dysuria, back pain, rash. No palliative/provocative factors.        Unless otherwise stated in this report or unable to obtain because of the patient's clinical or mental status as evidenced by the medical record, this patient's positive and negative responses for review of systems, constitutional, psych, eyes, ENT, cardiovascular, respiratory, gastrointestinal, neurological, genitourinary, musculoskeletal, integument systems and systems related to the presenting problem are either stated in the preceding paragraph or were not pertinent or were negative for the symptoms and/or complaints related to the medical problem. I have reviewed the following from the nursing documentation:      Prior to Admission medications    Medication Sig Start Date End Date Taking? Authorizing Provider   zolpidem (AMBIEN) 10 MG tablet Take 1 tablet by mouth nightly as needed (insomnia) for up to 90 days.  6/7/21 9/5/21  Tiffanie R Kehrt, APRN - CNP   rivaroxaban (XARELTO) 20 MG TABS tablet Take 1 tablet by mouth daily (with breakfast) 4/19/21   Shirley Marquez MD   aspirin (ASPIRIN CHILDRENS) 81 MG chewable tablet Take 1 tablet by mouth daily 4/1/21   Shirley Marquez MD   atorvastatin (LIPITOR) 40 MG tablet Take 1 tablet by mouth nightly At bedtime 3/19/21   Shirley Marquez MD   traZODone (DESYREL) 100 MG tablet Take 2 tablets by mouth nightly 3/16/21   Ge Olmos MD   ondansetron (ZOFRAN-ODT) 4 MG disintegrating tablet Take 4 mg by mouth every 8 hours as needed 1/31/21   Historical Provider, MD   sildenafil (REVATIO) 20 MG tablet Take 4 tablets by mouth as needed (30 min prior to intercourse) 1/11/21   Shirley Marquez MD   FLUoxetine (PROZAC) 20 MG capsule TAKE 1 CAPSULE BY MOUTH DAILY 12/3/20   Shirley Marquez MD   pantoprazole (PROTONIX) 40 MG tablet Take 1 tablet by mouth 2 times daily 2/28/20   Shirley Marquez MD   calcium-vitamin D (OSCAL 500/200 D-3) 500-200 MG-UNIT per tablet Take 1 tablet by mouth 2 times daily     Historical Provider, MD   Multiple Vitamin TABS Take 1 tablet by mouth daily     Historical Provider, MD       Allergies as of 06/22/2021 - Fully Reviewed 06/22/2021   Allergen Reaction Noted    Nsaids Nausea Only and Other (See Comments) 07/11/2013    Morphine Hives and Itching 04/26/2016    Prochlorperazine Other (See Comments) 12/30/2015    Valtrex [valacyclovir hcl] Diarrhea 08/09/2019    Morphine and related Hives and Itching 11/29/2016    Tolmetin Nausea Only 07/11/2013       Past Medical History:   Diagnosis Date    [address]ism [facility]     last drink 2015    Allergic migraine with status migrainosus     Allergic rhinitis, seasonal     Anemia     Arthritis     right knee    Vaughn's esophagus     Benign intracranial hypertension     Cancer (HCC)     renal     Carpal tunnel syndrome     Chronic pain     Depression     GERD (gastroesophageal reflux disease)     Headache(784.0)     History of blood transfusion     History of tobacco use     Quit 7/2014    Migraine     chronic for 1 year    Morbid obesity ([facility]     Movement disorder     Onychomycosis     Sleep apnea, obstructive     Severe uses cpap stop bang 6    Unspecified cerebral artery occlusion with cerebral infarction 2014    slight weakness in left arm    Use of cane as ambulatory aid     Wears dentures     full set    Wears glasses         Surgical History:   Past Surgical History:   Procedure Laterality Date    ABDOMEN SURGERY      gastric bypass    ABDOMEN SURGERY  4-7-2016    repair of recurrent incisional hernia with mesh, removal of old mesh, bilateral component separation    ABDOMINAL EXPLORATION SURGERY  1/11/16    exp lap, lysis of adhesions, small bowel resection    CARPAL TUNNEL RELEASE      bilat    DILATATION, ESOPHAGUS      ENDOSCOPY, COLON, DIAGNOSTIC      EYE SURGERY      GASTRIC BYPASS SURGERY  Jan 2009    Has lost about 200 pounds.     HERNIA REPAIR  3-    ventral    JOINT REPLACEMENT      KIDNEY REMOVAL Left 08/01/2018    KNEE ARTHROSCOPY Right 7/11/2013    Dr.Robert Sanders     KNEE ARTHROSCOPY Right 7/11/12    RIGHT KNEE ARTHROSCOPY WITH CHONDROPLASTY    KNEE SURGERY  July 2012    right, arthroscopy    KNEE SURGERY Right 2/18/2020    INCISION AND DRAINAGE RIGHT KNEE AND WOULD VAC PLACEMENT performed by Bar Ansari MD at [address] Right 2/26/2021    INCISION AND DRAINAGE OF RIGHT KNEE HEMATOMA performed by Keturah Craig MD at [address] Right 3/5/2021    INCISION AND DRAINAGE AND CLOSURE RIGHT TOTAL KNEE performed by Keturah Craig MD at [address]  2005    OTHER SURGICAL HISTORY Left 03/08/2016    CT biopsy ablasion left kidney    OTHER SURGICAL HISTORY  2016    small intestine 12 inch removed, 2 hernia surgeries, another surgery to drain infection from incision.  REVISION TOTAL KNEE ARTHROPLASTY Right 12/17/2019    RIGHT REVISION TIBIA TOTAL KNEE REPLACEMENT performed by Bar Ansari MD at [address] [facility] Right 1/22/2020    RIGHT KNEE IRRIGATION AND DEBRIDEMENT WITH POLY EXCHANGE performed by Maite Hutson MD at [address] [facility] Right 2/16/2021    RIGHT REMOVAL OF EXPLANT AND TOTAL KNEE REPLACEMENT performed by Keturah Craig MD at [address] [facility]  10/15/13    RIGHT    TOTAL KNEE ARTHROPLASTY Right 8/12/2020    RIGHT ARTHROPLASY  RESECTION WITH INSERTION OF SPACER performed by Bar Ansari MD at [address]  6-7-2016    UPPER GASTROINTESTINAL ENDOSCOPY  04/02/2018        Family History:    Family History   Problem Relation Age of Onset    Cancer Father         Lymphoma    Arthritis Mother     Heart Disease Maternal Uncle     Heart Disease Maternal Uncle     Diabetes Maternal Uncle        Social History     Socioeconomic History    Marital status:      Spouse name: José Luis Dukes Number of children: 2    Years of education: Not on file    Highest education level: Not on file   Occupational History    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    Tobacco Use    Smoking status: Former Smoker     Packs/day: 0.50     Years: 25.00 Pack years: 12.50     Types: Cigarettes     Quit date: 2021     Years since quittin.1    Smokeless tobacco: Never Used    Tobacco comment: Patient vapes   Vaping Use    Vaping Use: Every day    Substances: Flavoring    Devices: Refillable tank   Substance and Sexual Activity    Alcohol use: No     Alcohol/week: 0.0 standard drinks     Comment: last drink     Drug use: Yes     Types: Marijuana     Comment: medical marjiuana card    Sexual activity: Yes     Partners: Female     Comment: wife Ashia Robledo   Other Topics Concern    Not on file   Social History Narrative    Not on file     Social Determinants of Health     Financial Resource Strain:     Difficulty of Paying Living Expenses:    Food Insecurity:     Worried About Running Out of Food in the Last Year:     [address] in the Last Year:    Transportation Needs:     Lack of Transportation (Medical):  Lack of Transportation (Non-Medical):    Physical Activity:     Days of Exercise per Week:     Minutes of Exercise per Session:    Stress:     Feeling of Stress :    Social Connections:     Frequency of Communication with Friends and Family:     Frequency of Social Gatherings with Friends and Family:     Attends L[employer] day Services:     Active Member of Clubs or Organizations:     Attends Club or Organization Meetings:     Marital Status:    Intimate Partner Violence:     Fear of Current or Ex-Partner:     Emotionally Abused:     Physically Abused:     Sexually Abused:        Nursing notes reviewed. ED Triage Vitals [21]   Enc Vitals Group      /80      Pulse 87      Resp 18      Temp 98.3 °F (36.8 °C)      Temp Source Oral      SpO2 98 %      Weight 211 lb 14.4 oz (96.1 kg)      Height 6' (1.829 m)      Head Circumference       Peak Flow       Pain Score       Pain Loc       Pain Edu? Excl. in [address]? GENERAL:   Body mass index is 28.74 kg/m². Awake, alert.   Well developed, well nourished with apparent distress. Nontoxic-appearing, non-ill-appearing. HENT:   Normocephalic, Atraumatic, no lacerations. No ENT exam due to PPE. EYES:   Conjunctiva normal,   Pupils equal round and reactive to light,   Extraocular movements normal.  NECK:  Trachea is midline. No stridor. CHEST:  Regular rate and regular rhythm, no murmurs/rubs/gallops,  normal S1/S2, chest wall non-tender. LUNGS:  No respiratory distress. No abdominal retractions, no sternal retractions  Clear to auscultation bilaterally, no wheezing, no rhochi, no rales  Speaking comfortably in full sentences  ABDOMEN:  Soft, right-sided abdominal tenderness to palpation, non-distended. No guarding. No rebound. No costovertebral angle tenderness to palpation. Diminished BS, no organomegaly, no abdominal masses  EXTREMITIES:  Moves extremities x4 with purpose. Normal range of motion, no edema,  No tenderness, no deformity,  distal pulses present and equal bilaterally. BACK:  No midline tenderness in the cervical, thoracic, and lumbar spine. No deformities, no step-off. SKIN:  Warm, dry and intact. NEUROLOGIC:  Normal mental status. Moving all extremities to command. Alert and oriented x4  without focal motor deficit or gross sensory deficit. Normal speech. PSYCHIATRIC:  Not anxious,  normal mood and affect,  Appropriate eye contact,  thoughts are linear and organized,  without delusions/hallucinations,  Not responding to internal stimuli,  responds appropriately to questions    LABS and DIAGNOSTIC RESULTS  EKG  The Ekg interpreted by me shows  normal sinus rhythm with a rate of 73  Axis is   Normal  QTc is  normal  Intervals and Durations are unremarkable. ST Segments: no acute change and normal  Delta waves, Brugada Syndrome, and Short GA are not present. Prior EKG to compare with was available. No significant changes compared to prior EKG from February 11, 2021      RADIOLOGY  X-RAYS:  I have reviewed radiologic plain film image(s). ALL OTHER NON-PLAIN FILM IMAGES SUCH AS CT, ULTRASOUND AND MRI HAVE BEEN READ BY THE RADIOLOGIST. CT ABDOMEN PELVIS W IV CONTRAST Additional Contrast? None   Preliminary Result   1. No new acute abdominal or pelvic abnormality. 2. Status post Darrian-en-Y gastric bypass, splenectomy and left nephrectomy. 3. Cholelithiasis. 4. Stable subcutaneous edema with stable induration along the midline   abdominal wall just above the umbilicus.                 LABS  Results for orders placed or performed during the hospital encounter of 06/22/21   CBC Auto Differential   Result Value Ref Range    WBC 7.2 4.0 - 11.0 K/uL    RBC 4.06 (L) 4.20 - 5.90 M/uL    Hemoglobin 11.5 (L) 13.5 - 17.5 g/dL    Hematocrit 35.1 (L) 40.5 - 52.5 %    MCV 86.4 80.0 - 100.0 fL    MCH 28.2 26.0 - 34.0 pg    MCHC 32.6 31.0 - 36.0 g/dL    RDW 22.4 (H) 12.4 - 15.4 %    Platelets 314 221 - 770 K/uL    MPV 8.8 5.0 - 10.5 fL    Neutrophils % 44.0 %    Lymphocytes % 39.0 %    Monocytes % 9.0 %    Eosinophils % 5.0 %    Basophils % 0.0 %    Neutrophils Absolute 3.2 1.7 - 7.7 K/uL    Lymphocytes Absolute 3.0 1.0 - 5.1 K/uL    Monocytes Absolute 0.6 0.0 - 1.3 K/uL    Eosinophils Absolute 0.4 0.0 - 0.6 K/uL    Basophils Absolute 0.0 0.0 - 0.2 K/uL    Bands Relative 1 0 - 7 %    Atypical Lymphocytes Relative 2 0 - 6 %    Poikilocytes 1+ (A)     Schistocytes 1+ (A)     Acanthocytes Occasional (A)     Shiva Cells Occasional (A)     Target Cells 1+ (A)    Comprehensive Metabolic Panel w/ Reflex to MG   Result Value Ref Range    Sodium 137 136 - 145 mmol/L    Potassium reflex Magnesium 4.4 3.5 - 5.1 mmol/L    Chloride 108 99 - 110 mmol/L    CO2 20 (L) 21 - 32 mmol/L    Anion Gap 9 3 - 16    Glucose 91 70 - 99 mg/dL    BUN 12 7 - 20 mg/dL    CREATININE 0.9 0.9 - 1.3 mg/dL    GFR Non-African American >60 >60    GFR African American >60 >60    Calcium 8.8 8.3 - 10.6 mg/dL    Total Protein 6.1 (L) 6.4 - 8.2 g/dL    Albumin 3.8 3.4 - 5.0 g/dL    Albumin/Globulin sodium chloride bolus    HYDROmorphone (DILAUDID) injection 1 mg    ondansetron (ZOFRAN) injection 4 mg       ED course notes for this visit  ED Course as of Jun 22 2310   Tue Jun 22, 2021 2307 She reports feeling better, I offered admission versus attempt to control the pain at home, at this time he would prefer to go home to see if he can control the pain. Patient has had Percocet in the past which is worked well for his abdominal pain. He has an upcoming appointment for ketamine infusion on July 1.    [SG]      ED Course User Index  [SG] Marah Womack MD       I wore surgical mask and gloves when I evaluated the patient. I evaluated the patient in room QC 16/16    Differential diagnosis: Abdominal Aortic Aneurysm, Acute Coronary Syndrome, Ischemic Bowel, Bowel Obstruction (including Gastric Outlet Obstruction), PUD, GERD, Acute Cholecystitis, Pancreatitis, Hepatitis, Colitis, SMA Syndrome, Mesenteric Steal Syndrome, Splanchnic Vein Thrombosis, Appendicitis, Diverticulitis, Pyelonephritis, UTI, STD, Gonad Torsion, Ureterolithiasis      This is a very pleasant patient presents with abdominal pain of unclear etiology. At the time of discharge, patient is without significant evidence of toxicity, shock, sepsis, hemodynamic or cardiopulmonary instability, acute appendicitis, acute cholecystitis, AAA, bowel obstruction, bowel perforation, herpes zoster, a cardiac or pulmonary etiology as a cause for the abdominal symptoms, or any disease process requiring other immediate surgical or medical intervention at this time. A CT scan was performed to evaluate for potential causes of the abdominal pain, however, neither the clinical exam nor the CT has identified an emergent etiology for the abdominal pain. Based on the physical exam, the laboratory studies, and the CT findings, I have a low suspicion for a surgical emergency or any other life-threatening disease process at this time.  I have discussed with the patient the level of uncertainty with undifferentiated abdominal pain and that it may be too early in the disease process to make a definitive diagnosis of all causes of serious causes of abdominal pain. After treatment in the ER, patient had improvement of their symptoms. On repeat physical exam, patient has a benign abdominal exam.  Pain management and follow-up plan were discussed with the patient. I have clearly explained the need to follow-up as noted on the discharge instructions. The patient understands that all disease processes change over time and therefore close follow up and serial exams are mandatory should the symptoms not resolve completely. The patient understands to return to the Emergency Department immediately if the pain worsens, develops fever, persistent and uncontrollable vomiting, or for any new symptoms or concerns. Otherwise, they will follow the discharge instructions to arrange close follow up for a re-evaluation. It is understood that if the patient is not improving as expected or if other new symptoms or signs of concern develop, other etiologies or diagnoses may need to be considered requiring other tests, treatments, consultations, and/or admission. The diagnosis, plan, expected course, follow-up, and return precautions were discussed and all questions were answered. Final Impression    1. Abdominal pain, unspecified abdominal location    2. Nausea        Blood pressure 124/80, pulse 87, temperature 98.3 °F (36.8 °C), temperature source Oral, resp. rate 18, height 6' (1.829 m), weight 211 lb 14.4 oz (96.1 kg), SpO2 98 %. Medication Summary  Patient was given scripts for the following medications. I counseled patient how to take these medications.   New Prescriptions    ONDANSETRON (ZOFRAN-ODT) 4 MG DISINTEGRATING TABLET    Place 1 tablet under the tongue every 8 hours as needed for Nausea or Vomiting May Sub regular tablet (non-ODT) if insurance does not cover ODT.    OXYCODONE-ACETAMINOPHEN (PERCOCET) 5-325 MG PER TABLET    Take 1 tablet by mouth every 6 hours as needed for Pain for up to 7 days. Patient had scripts modified or refilled for the following medications. I counseled patient how to take these medications. Modified Medications    No medications on file       Patient had scripts discontinues for the following medications. I counseled patient to stop taking these medications. Discontinued Medications    ONDANSETRON (ZOFRAN-ODT) 4 MG DISINTEGRATING TABLET    Take 4 mg by mouth every 8 hours as needed         Disposition  At this point I do not feel the patient requires further work up and it is reasonable to discharge the patient. I had a discussion with the patient and/or their surrogate regarding diagnosis, diagnostic testing results, treatment/ plan of care, and follow up. Patient and/or companions verbalized understanding of the ED workup, any relevant findings as well as any incidental findings, and the disposition and plan. There was shared decision-making between myself as well as the patient and/or their surrogate and we are all in agreement with discharge home. Annelle Epley was an opportunity for questions and all questions were answered to the best of my ability and to the satisfaction of the patient and/or patient's family. Patient agreed to follow up as recommend for further evaluation/treatment. The patient was given strict return precautions as we discussed symptoms that would necessitate return to the ED. Patient will return to ED for new/worsening symptoms. The patient verbalized their understanding and agreement with the above plan. Please refer to AVS for further details regarding discharge instructions. Pt is in stable condition upon Discharge to home. The note was completed using Dragon voice recognition transcription.  Every effort was made to ensure accuracy; however, inadvertent transcription errors may be present despite my best efforts to edit errors.     Margret Batista MD  [address] [facility]        Margret Batista MD  06/22/21 [id number]

## 2021-06-23 LAB
EKG ATRIAL RATE: 73 BPM
EKG DIAGNOSIS: NORMAL
EKG P AXIS: 62 DEGREES
EKG P-R INTERVAL: 164 MS
EKG Q-T INTERVAL: 368 MS
EKG QRS DURATION: 80 MS
EKG QTC CALCULATION (BAZETT): 405 MS
EKG R AXIS: -4 DEGREES
EKG T AXIS: 25 DEGREES
EKG VENTRICULAR RATE: 73 BPM

## 2021-06-23 PROCEDURE — 93010 ELECTROCARDIOGRAM REPORT: CPT | Performed by: INTERNAL MEDICINE

## 2021-06-24 ENCOUNTER — CARE COORDINATION (OUTPATIENT)
Dept: CARE COORDINATION | Age: 51
End: 2021-06-24

## 2021-06-24 NOTE — CARE COORDINATION
Educated patient about risk for severe COVID-19 due to risk factors according to CDC guidelines. ACM reviewed discharge instructions, medical action plan and red flag symptoms with the patient who verbalized understanding. Discussed COVID vaccination status: Yes. Education provided on COVID-19 vaccination as appropriate. Discussed exposure protocols and quarantine with CDC Guidelines. Patient was given an opportunity to verbalize any questions and concerns and agrees to contact ACM or health care provider for questions related to their healthcare. Patient is eligable for ambulatory care enrollment, but denies needs at this time. He stated that he still has this [facility]'s contact information for future needs from the last time I had spoke with him.

## 2021-06-25 ENCOUNTER — HOSPITAL ENCOUNTER (EMERGENCY)
Age: 51
Discharge: HOME | End: 2021-06-25
Payer: COMMERCIAL

## 2021-06-25 VITALS
DIASTOLIC BLOOD PRESSURE: 70 MMHG | BODY MASS INDEX: 27.94 KG/M2 | OXYGEN SATURATION: 97 % | WEIGHT: 206 LBS | TEMPERATURE: 99 F | SYSTOLIC BLOOD PRESSURE: 124 MMHG | HEART RATE: 62 BPM | RESPIRATION RATE: 18 BRPM

## 2021-06-25 DIAGNOSIS — G89.29 CHRONIC ABDOMINAL PAIN: Primary | ICD-10-CM

## 2021-06-25 DIAGNOSIS — R10.9 CHRONIC ABDOMINAL PAIN: Primary | ICD-10-CM

## 2021-06-25 LAB
A/G RATIO: 1.7 (ref 1.1–2.2)
ALBUMIN SERPL-MCNC: 4 G/DL (ref 3.4–5)
ALP BLD-CCNC: 100 U/L (ref 40–129)
ALT SERPL-CCNC: 8 U/L (ref 10–40)
AMPHETAMINE SCREEN, URINE: ABNORMAL
ANION GAP SERPL CALCULATED.3IONS-SCNC: 11 MMOL/L (ref 3–16)
ANISOCYTOSIS: ABNORMAL
AST SERPL-CCNC: 11 U/L (ref 15–37)
BARBITURATE SCREEN URINE: ABNORMAL
BASOPHILS ABSOLUTE: 0 K/UL (ref 0–0.2)
BASOPHILS RELATIVE PERCENT: 0 %
BENZODIAZEPINE SCREEN, URINE: ABNORMAL
BILIRUB SERPL-MCNC: 0.5 MG/DL (ref 0–1)
BUN BLDV-MCNC: 6 MG/DL (ref 7–20)
BURR CELLS: ABNORMAL
CALCIUM SERPL-MCNC: 9.3 MG/DL (ref 8.3–10.6)
CANNABINOID SCREEN URINE: ABNORMAL
CHLORIDE BLD-SCNC: 106 MMOL/L (ref 99–110)
CO2: 23 MMOL/L (ref 21–32)
COCAINE METABOLITE SCREEN URINE: ABNORMAL
CREAT SERPL-MCNC: 0.8 MG/DL (ref 0.9–1.3)
EOSINOPHILS ABSOLUTE: 0.1 K/UL (ref 0–0.6)
EOSINOPHILS RELATIVE PERCENT: 2 %
GFR AFRICAN AMERICAN: >60
GFR NON-AFRICAN AMERICAN: >60
GLOBULIN: 2.4 G/DL
GLUCOSE BLD-MCNC: 109 MG/DL (ref 70–99)
HCT VFR BLD CALC: 36.5 % (ref 40.5–52.5)
HEMOGLOBIN: 12 G/DL (ref 13.5–17.5)
LIPASE: 19 U/L (ref 13–60)
LYMPHOCYTES ABSOLUTE: 2.5 K/UL (ref 1–5.1)
LYMPHOCYTES RELATIVE PERCENT: 47 %
Lab: ABNORMAL
MCH RBC QN AUTO: 28.3 PG (ref 26–34)
MCHC RBC AUTO-ENTMCNC: 33 G/DL (ref 31–36)
MCV RBC AUTO: 85.7 FL (ref 80–100)
METHADONE SCREEN, URINE: ABNORMAL
MONOCYTES ABSOLUTE: 0.3 K/UL (ref 0–1.3)
MONOCYTES RELATIVE PERCENT: 5 %
NEUTROPHILS ABSOLUTE: 2.4 K/UL (ref 1.7–7.7)
NEUTROPHILS RELATIVE PERCENT: 46 %
NUCLEATED RED BLOOD CELLS: 1 /100 WBC
OPIATE SCREEN URINE: ABNORMAL
OXYCODONE URINE: POSITIVE
PDW BLD-RTO: 21.9 % (ref 12.4–15.4)
PH UA: 5
PHENCYCLIDINE SCREEN URINE: ABNORMAL
PLATELET # BLD: 318 K/UL (ref 135–450)
PLATELET SLIDE REVIEW: ADEQUATE
PMV BLD AUTO: 8.8 FL (ref 5–10.5)
POTASSIUM SERPL-SCNC: 4 MMOL/L (ref 3.5–5.1)
PROPOXYPHENE SCREEN: ABNORMAL
RBC # BLD: 4.26 M/UL (ref 4.2–5.9)
SCHISTOCYTES: ABNORMAL
SLIDE REVIEW: ABNORMAL
SODIUM BLD-SCNC: 140 MMOL/L (ref 136–145)
TARGET CELLS: ABNORMAL
TOTAL PROTEIN: 6.4 G/DL (ref 6.4–8.2)
WBC # BLD: 5.3 K/UL (ref 4–11)

## 2021-06-25 PROCEDURE — 6360000002 HC RX W HCPCS: Performed by: PHYSICIAN ASSISTANT

## 2021-06-25 PROCEDURE — 96375 TX/PRO/DX INJ NEW DRUG ADDON: CPT

## 2021-06-25 PROCEDURE — 80307 DRUG TEST PRSMV CHEM ANLYZR: CPT

## 2021-06-25 PROCEDURE — 80053 COMPREHEN METABOLIC PANEL: CPT

## 2021-06-25 PROCEDURE — 96374 THER/PROPH/DIAG INJ IV PUSH: CPT

## 2021-06-25 PROCEDURE — 85025 COMPLETE CBC W/AUTO DIFF WBC: CPT

## 2021-06-25 PROCEDURE — 36415 COLL VENOUS BLD VENIPUNCTURE: CPT

## 2021-06-25 PROCEDURE — 99283 EMERGENCY DEPT VISIT LOW MDM: CPT

## 2021-06-25 PROCEDURE — 83690 ASSAY OF LIPASE: CPT

## 2021-06-25 RX ORDER — ONDANSETRON 2 MG/ML
4 INJECTION INTRAMUSCULAR; INTRAVENOUS ONCE
Status: COMPLETED | OUTPATIENT
Start: 2021-06-25 | End: 2021-06-25

## 2021-06-25 RX ORDER — FLUOXETINE HYDROCHLORIDE 20 MG/1
20 CAPSULE ORAL DAILY
Qty: 90 CAPSULE | Refills: 1 | Status: SHIPPED | OUTPATIENT
Start: 2021-06-25 | End: 2021-09-27 | Stop reason: SDUPTHER

## 2021-06-25 RX ADMIN — ONDANSETRON 4 MG: 2 INJECTION INTRAMUSCULAR; INTRAVENOUS at 15:38

## 2021-06-25 RX ADMIN — HYDROMORPHONE HYDROCHLORIDE 1 MG: 1 INJECTION, SOLUTION INTRAMUSCULAR; INTRAVENOUS; SUBCUTANEOUS at 15:39

## 2021-06-25 ASSESSMENT — ENCOUNTER SYMPTOMS
BACK PAIN: 0
EYE DISCHARGE: 0
VOMITING: 0
NAUSEA: 1
EYE REDNESS: 0
CHOKING: 0
SHORTNESS OF BREATH: 0
ABDOMINAL PAIN: 1
APNEA: 0
FACIAL SWELLING: 0

## 2021-06-25 ASSESSMENT — PAIN DESCRIPTION - ONSET: ONSET: ON-GOING

## 2021-06-25 ASSESSMENT — PAIN SCALES - GENERAL
PAINLEVEL_OUTOF10: 10
PAINLEVEL_OUTOF10: 10

## 2021-06-25 ASSESSMENT — PAIN DESCRIPTION - DESCRIPTORS: DESCRIPTORS: ACHING;SHARP

## 2021-06-25 ASSESSMENT — PAIN DESCRIPTION - LOCATION: LOCATION: ABDOMEN

## 2021-06-25 ASSESSMENT — PAIN DESCRIPTION - PROGRESSION: CLINICAL_PROGRESSION: GRADUALLY WORSENING

## 2021-06-25 ASSESSMENT — PAIN DESCRIPTION - ORIENTATION: ORIENTATION: RIGHT

## 2021-06-25 ASSESSMENT — PAIN DESCRIPTION - PAIN TYPE: TYPE: ACUTE PAIN

## 2021-06-25 NOTE — ED PROVIDER NOTES
**ADVANCED PRACTICE PROVIDER, I HAVE EVALUATED THIS PATIENT**        [address] ENCOUNTER      Pt Name: Juana aBrton  HQB:[id number]  [place] 1970  Date of evaluation: 6/25/2021  Provider: Katharina Closs, PA-C      Chief Complaint:    Chief Complaint   Patient presents with    Abdominal Pain     rt sided abd pain increasing since tuesday 4 days ago         Nursing Notes, Past Medical Hx, Past Surgical Hx, Social Hx, Allergies, and Family Hx were all reviewed and agreed with or any disagreements were addressed in the HPI.    HPI: (Location, Duration, Timing, Severity, Quality, Assoc Sx, Context, Modifying factors)  This is a  49 y.o. male who presents with chief complaint of right-sided upper abdominal pain. Patient history of gastric bypass. Also multiple abdominal surgeries and has a history of adhesions scar tissue. Has chronic abdominal pain. Goes to [place] pain management and gets ketamine drip for his chronic pain. Also on gabapentin. He was seen here 2 days ago for the same and was given Percocet for pain said he took his last one today still having pain was told that if he still have pain he may get admitted for pain control. So he returned. Some nausea but no vomiting. Denies fever. No chest pain, no back pain. Pain is in the usual area of the right side upper to mid abdomen. No other complaints.       PastMedical/Surgical History:      Diagnosis Date    Alcoholism [facility])     last drink 2015    Allergic migraine with status migrainosus     Allergic rhinitis, seasonal     Anemia     Arthritis     right knee    Vaughn's esophagus     Benign intracranial hypertension     Cancer (HCC)     renal     Carpal tunnel syndrome     Chronic pain     Depression     GERD (gastroesophageal reflux disease)     Headache(784.0)     History of blood transfusion     History of tobacco use     Quit 7/2014    Migraine     chronic for 1 year  Morbid obesity ([address]     Movement disorder     Onychomycosis     Sleep apnea, obstructive     Severe uses cpap stop bang 6    Unspecified cerebral artery occlusion with cerebral infarction 2014    slight weakness in left arm    Use of cane as ambulatory aid     Wears dentures     full set    Wears glasses          Procedure Laterality Date    ABDOMEN SURGERY      gastric bypass    ABDOMEN SURGERY  4-7-2016    repair of recurrent incisional hernia with mesh, removal of old mesh, bilateral component separation    ABDOMINAL EXPLORATION SURGERY  1/11/16    exp lap, lysis of adhesions, small bowel resection    CARPAL TUNNEL RELEASE      bilat    DILATATION, ESOPHAGUS      ENDOSCOPY, COLON, DIAGNOSTIC      EYE SURGERY      GASTRIC BYPASS SURGERY  Jan 2009    Has lost about 200 pounds.  HERNIA REPAIR  3-    ventral    JOINT REPLACEMENT      KIDNEY REMOVAL Left 08/01/2018    KNEE ARTHROSCOPY Right 7/11/2013    Dr.Robert WaltersAdelina     KNEE ARTHROSCOPY Right 7/11/12    RIGHT KNEE ARTHROSCOPY WITH CHONDROPLASTY    KNEE SURGERY  July 2012    right, arthroscopy    KNEE SURGERY Right 2/18/2020    INCISION AND DRAINAGE RIGHT KNEE AND WOULD VAC PLACEMENT performed by Ryan Gupta MD at [facility] Right 2/26/2021    INCISION AND DRAINAGE OF RIGHT KNEE HEMATOMA performed by Bobby Morrison MD at [place] [id number] Right 3/5/2021    INCISION AND DRAINAGE AND CLOSURE RIGHT TOTAL KNEE performed by Bobby Morrison MD at [address] [facility]  2005    OTHER SURGICAL HISTORY Left 03/08/2016    CT biopsy ablasion left kidney    OTHER SURGICAL HISTORY  2016    small intestine 12 inch removed, 2 hernia surgeries, another surgery to drain infection from incision.      REVISION TOTAL KNEE ARTHROPLASTY Right 12/17/2019    RIGHT REVISION TIBIA TOTAL KNEE REPLACEMENT performed by Ryan Gupta MD at [address] Right 1/22/2020    RIGHT KNEE IRRIGATION AND DEBRIDEMENT WITH POLY EXCHANGE performed by Maura Frances MD at [address] [facility] [address] Right 2/16/2021    RIGHT REMOVAL OF EXPLANT AND TOTAL KNEE REPLACEMENT performed by Zuleika Martin MD at [address] [facility]  10/15/13    RIGHT    TOTAL KNEE ARTHROPLASTY Right 8/12/2020    RIGHT ARTHROPLASY  RESECTION WITH INSERTION OF SPACER performed by Rambo Castro MD at [address]  6-7-2016    UPPER GASTROINTESTINAL ENDOSCOPY  04/02/2018       Medications:  Previous Medications    ASPIRIN (ASPIRIN CHILDRENS) 81 MG CHEWABLE TABLET    Take 1 tablet by mouth daily    ATORVASTATIN (LIPITOR) 40 MG TABLET    Take 1 tablet by mouth nightly At bedtime    CALCIUM-VITAMIN D (OSCAL 500/200 D-3) 500-200 MG-UNIT PER TABLET    Take 1 tablet by mouth 2 times daily     FLUOXETINE (PROZAC) 20 MG CAPSULE    TAKE 1 CAPSULE BY MOUTH DAILY    MULTIPLE VITAMIN TABS    Take 1 tablet by mouth daily     ONDANSETRON (ZOFRAN-ODT) 4 MG DISINTEGRATING TABLET    Place 1 tablet under the tongue every 8 hours as needed for Nausea or Vomiting May Sub regular tablet (non-ODT) if insurance does not cover ODT. OXYCODONE-ACETAMINOPHEN (PERCOCET) 5-325 MG PER TABLET    Take 1 tablet by mouth every 6 hours as needed for Pain for up to 7 days. PANTOPRAZOLE (PROTONIX) 40 MG TABLET    Take 1 tablet by mouth 2 times daily    RIVAROXABAN (XARELTO) 20 MG TABS TABLET    Take 1 tablet by mouth daily (with breakfast)    SILDENAFIL (REVATIO) 20 MG TABLET    Take 4 tablets by mouth as needed (30 min prior to intercourse)    TRAZODONE (DESYREL) 100 MG TABLET    Take 2 tablets by mouth nightly    ZOLPIDEM (AMBIEN) 10 MG TABLET    Take 1 tablet by mouth nightly as needed (insomnia) for up to 90 days. Review of Systems:  (2-9 systems needed)  Review of Systems   Constitutional: Negative for chills and fever.    HENT: Negative for congestion, facial swelling and sneezing. Eyes: Negative for discharge and redness. Respiratory: Negative for apnea, choking and shortness of breath. Cardiovascular: Negative for chest pain. Gastrointestinal: Positive for abdominal pain and nausea. Negative for vomiting. Genitourinary: Negative for dysuria. Musculoskeletal: Negative for back pain, neck pain and neck stiffness. Neurological: Negative for dizziness, tremors, seizures and headaches. All other systems reviewed and are negative. Physical Exam:  Physical Exam  Constitutional:       Appearance: He is well-developed. He is not diaphoretic. HENT:      Head: Normocephalic and atraumatic. Nose: Nose normal.      Mouth/Throat:      Mouth: Mucous membranes are moist.      Pharynx: Oropharynx is clear. Eyes:      General:         Right eye: No discharge. Left eye: No discharge. Cardiovascular:      Rate and Rhythm: Normal rate and regular rhythm. Heart sounds: Normal heart sounds. No murmur heard. No friction rub. No gallop. Pulmonary:      Effort: Pulmonary effort is normal. No respiratory distress. Breath sounds: Normal breath sounds. No wheezing or rales. Chest:      Chest wall: No tenderness. Abdominal:      General: Abdomen is flat. Bowel sounds are normal. There is no distension. Palpations: Abdomen is soft. There is no mass. Tenderness: There is abdominal tenderness. There is no guarding or rebound. Musculoskeletal:         General: Normal range of motion. Cervical back: Normal range of motion and neck supple. Skin:     General: Skin is warm and dry. Neurological:      General: No focal deficit present. Mental Status: He is alert and oriented to person, place, and time.    Psychiatric:         Behavior: Behavior normal.         MEDICAL DECISION MAKING    Vitals:    Vitals:    06/25/21 1459   BP: 125/77   Pulse: 61   Resp: 18   Temp: 99 °F (37.2 °C)   SpO2: 97%   Weight: 206 lb (93.4 kg)       LABS:  Labs Reviewed   CBC WITH AUTO DIFFERENTIAL - Abnormal; Notable for the following components:       Result Value    Hemoglobin 12.0 (*)     Hematocrit 36.5 (*)     RDW 21.9 (*)     nRBC 1 (*)     Anisocytosis 1+ (*)     Schistocytes Occasional (*)     [place]ft Cells 1+ (*)     Target Cells 1+ (*)     All other components within normal limits    Narrative:     Performed at:  [facility]  [address] [place], [place]   Phone [phone]   COMPREHENSIVE METABOLIC PANEL - Abnormal; Notable for the following components:    Glucose 109 (*)     BUN 6 (*)     CREATININE 0.8 (*)     ALT 8 (*)     AST 11 (*)     All other components within normal limits    Narrative:     Performed at:  [facility]  [address] [place], [place]   Phone [phone]   [address] - Abnormal; Notable for the following components:    Oxycodone Urine POSITIVE (*)     All other components within normal limits    Narrative:     Performed at:  [facility]  [address] [place], [place]   Phone [phone]   LIPASE    Narrative:     Performed at:  [facility]  [address] [place], [place]   Phone [phone] of labs reviewed and were negative at this time or not returned at the time of this note.     RADIOLOGY:   Non-plain film images such as CT, Ultrasound and MRI are read by the radiologist. Jaye Alexander PA-C have directly visualized the radiologic plain film image(s) with the below findings:      Interpretation per the Radiologist below, if available at the time of this note:    No orders to display        [facility] Additional Contrast? None    Result Date: 6/24/2021  EXAMINATION: CT OF THE ABDOMEN AND PELVIS WITH CONTRAST 6/22/2021 9:39 pm TECHNIQUE: CT of the abdomen and pelvis was performed with the administration of intravenous contrast. Multiplanar reformatted images are provided for review. Dose modulation, iterative reconstruction, and/or weight based adjustment of the mA/kV was utilized to reduce the radiation dose to as low as reasonably achievable. COMPARISON: 05/24/2021 HISTORY: ORDERING SYSTEM PROVIDED HISTORY: Abdominal pain TECHNOLOGIST PROVIDED HISTORY: Additional Contrast?->None Reason for exam:->Abdominal pain Reason for Exam: Abdominal pain Acuity: Acute Type of Exam: Subsequent/Follow-up Additional signs and symptoms: Abdominal Pain (exacerbation of chronic abd pain x3 days, RLQ, 8/10) Relevant Medical/Surgical History: Former Smoker (Quit Date: 05/01/2021), 0.5 ppd, 12.5 pack-years; Vaping: Every Day. Hx of obesity, TIA, alcoholism, malignant neoplasm of left kidney, GERD, Lt kidney removal (8/01/018), incisional hernia repair with mesh (04/07/2016), hernia repair (03/23/2016), abdominal exploration surgery (01/11/2016), small intestine removal of 12 inches (2016), gastric bypass surgery (01/2009), and a splenectomy. FINDINGS: Lower Chest: Ground-glass attenuation within the left lower lobe. Organs: There is cholelithiasis. Status post splenectomy. The liver, pancreas and adrenal glands are otherwise without focal abnormality. No biliary duct dilation. No renal calculus. No hydronephrosis or perinephric stranding. Right renal cysts are noted. Status post left nephrectomy. GI/Bowel: Status post Darrian-en-Y gastric bypass. Colonic diverticulosis without acute diverticulitis. No dilated loops of bowel or bowel wall thickening. The appendix is normal.  No free air. Pelvis: No pathologically enlarged adenopathy or free fluid. The bladder is not well distended. The prostate gland is unremarkable. Peritoneum/Retroperitoneum: The aorta is normal in caliber with mild atherosclerosis. The celiac axis, SMA and ELHAM are patent.   Portal venous system is patent. No pathologically enlarged adenopathy. No ascites or drainable fluid collection. Bones/Soft Tissues: Subcutaneous edema. No acute osseous abnormality. Stable thickening, induration along the supraumbilical region which may be postsurgical in etiology. 1. No new acute abdominal or pelvic abnormality. 2. Status post Darrian-en-Y gastric bypass, splenectomy and left nephrectomy. 3. Cholelithiasis. 4. Stable subcutaneous edema with stable induration along the midline abdominal wall just above the umbilicus. MEDICAL DECISION MAKING / ED COURSE:      PROCEDURES:   Procedures    None    Patient was given:  Medications   HYDROmorphone (DILAUDID) injection 1 mg (1 mg Intravenous Given 6/25/21 1539)   ondansetron (ZOFRAN) injection 4 mg (4 mg Intravenous Given 6/25/21 1538)     Emergency room course: Patient on exam cardiovascular regular rhythm, lungs are clear. No wheeze rales or rhonchi noted. Abdomen is nondistended. Does have mild tenderness palpation along the right upper mid abdomen with palpation normal bowel sounds all 4 quadrant. No rebound or guarding noted. No CVA or flank tenderness. I did review patient CT scan he had 2 days ago which shows no acute abnormality. Lab results today show CBC within normal limits with a white count of 5.3. CMP unremarkable. .  Lipase 19.0    Urine drug screen shows positive for oxycodone. He was given this prescription 2 days ago. Nurse Arik Leblanc did inform me that patient says Dilaudid did not help Zofran did help with his nausea. I did talk to patient regarding his pain. Informed him he can try Lidoderm patch he says he has had that and it does not work, I did inform him to talk to his pain management doctor about trying a TENS unit. He states that not only made his pain go from a 9-8. Did not help much. After I told him not to strong as I can give if that does not help his no notes I can do.   He informed me that what his second shot helped I informed him that since the first 1 did not help night 1 to give him a second 1 he was okay with this plan. He does have Zofran at home which did help with his nausea. He will be discharged to follow back up with his pain management doctor. Continue his home medication as prescribed. The patient tolerated their visit well. I evaluated the patient. The physician was available for consultation as needed. The patient and / or the family were informed of the results of any tests, a time was given to answer questions, a plan was proposed and they agreed with plan. CLINICAL IMPRESSION:  1.  Chronic abdominal pain        DISPOSITION  DISPOSITION Decision To Discharge 06/25/2021 04:32:49 PM           PATIENT REFERRED TO:  Cj Jackson MD  [facility] [place] [address]  [phone]    Call in 1 day        DISCHARGE MEDICATIONS:  New Prescriptions    No medications on file       DISCONTINUED MEDICATIONS:  Discontinued Medications    No medications on file              (Please note the MDM and HPI sections of this note were completed with a voice recognition program.  Efforts were made to edit the dictations but occasionally words are mis-transcribed.)    Electronically signed, Lesia Chan PA-C,          Lesia Chan PA-C  06/25/21 [id number]

## 2021-06-28 ENCOUNTER — CARE COORDINATION (OUTPATIENT)
Dept: CARE COORDINATION | Age: 51
End: 2021-06-28

## 2021-06-28 NOTE — PRE-PROCEDURE INSTRUCTIONS
Called patient to confirm procedure times. Patient to arrive at 1130 for procedure start time of 1230. Reviewed NPO after MN, patient to take meds with sip of water day of procedure. Hold diuretics day of procedure. Bring COVID vaccination card.

## 2021-06-29 ENCOUNTER — HOSPITAL ENCOUNTER (OUTPATIENT)
Dept: CARDIAC CATH/INVASIVE PROCEDURES | Age: 51
Discharge: HOME | End: 2021-06-29
Payer: COMMERCIAL

## 2021-06-29 VITALS
OXYGEN SATURATION: 96 % | HEIGHT: 72 IN | DIASTOLIC BLOOD PRESSURE: 82 MMHG | TEMPERATURE: 98.4 F | SYSTOLIC BLOOD PRESSURE: 122 MMHG | BODY MASS INDEX: 26.95 KG/M2 | RESPIRATION RATE: 17 BRPM | WEIGHT: 199 LBS | HEART RATE: 74 BPM

## 2021-06-29 DIAGNOSIS — Q21.12 PFO (PATENT FORAMEN OVALE): ICD-10-CM

## 2021-06-29 PROCEDURE — 6360000002 HC RX W HCPCS

## 2021-06-29 PROCEDURE — 99152 MOD SED SAME PHYS/QHP 5/>YRS: CPT

## 2021-06-29 PROCEDURE — 93312 ECHO TRANSESOPHAGEAL: CPT

## 2021-06-29 PROCEDURE — 93325 DOPPLER ECHO COLOR FLOW MAPG: CPT

## 2021-06-29 PROCEDURE — 2580000003 HC RX 258

## 2021-06-29 RX ORDER — SODIUM CHLORIDE 0.9 % (FLUSH) 0.9 %
5-40 SYRINGE (ML) INJECTION PRN
Status: DISCONTINUED | OUTPATIENT
Start: 2021-06-29 | End: 2021-06-30 | Stop reason: HOSPADM

## 2021-06-29 RX ORDER — SODIUM CHLORIDE 9 MG/ML
INJECTION, SOLUTION INTRAVENOUS CONTINUOUS
Status: DISCONTINUED | OUTPATIENT
Start: 2021-06-29 | End: 2021-06-30 | Stop reason: HOSPADM

## 2021-06-29 RX ORDER — SODIUM CHLORIDE 9 MG/ML
25 INJECTION, SOLUTION INTRAVENOUS PRN
Status: DISCONTINUED | OUTPATIENT
Start: 2021-06-29 | End: 2021-06-30 | Stop reason: HOSPADM

## 2021-06-29 RX ORDER — SODIUM CHLORIDE 0.9 % (FLUSH) 0.9 %
5-40 SYRINGE (ML) INJECTION EVERY 12 HOURS SCHEDULED
Status: DISCONTINUED | OUTPATIENT
Start: 2021-06-29 | End: 2021-06-30 | Stop reason: HOSPADM

## 2021-06-29 ASSESSMENT — PAIN DESCRIPTION - LOCATION: LOCATION: ABDOMEN

## 2021-06-29 ASSESSMENT — PAIN DESCRIPTION - FREQUENCY: FREQUENCY: CONTINUOUS

## 2021-06-29 ASSESSMENT — PAIN SCALES - GENERAL: PAINLEVEL_OUTOF10: 8

## 2021-06-29 ASSESSMENT — PAIN DESCRIPTION - PAIN TYPE: TYPE: CHRONIC PAIN

## 2021-06-29 ASSESSMENT — PAIN DESCRIPTION - ORIENTATION: ORIENTATION: RIGHT

## 2021-06-29 NOTE — H&P
Yes Historical Provider, MD   sildenafil (REVATIO) 20 MG tablet Take 4 tablets by mouth as needed (30 min prior to intercourse) 1/11/21   Yes Malka Aceves MD   FLUoxetine (PROZAC) 20 MG capsule TAKE 1 CAPSULE BY MOUTH DAILY 12/3/20   Yes Malka Aceves MD   pantoprazole (PROTONIX) 40 MG tablet Take 1 tablet by mouth 2 times daily 2/28/20   Yes Malka Aceves MD   calcium-vitamin D (OSCAL 500/200 D-3) 500-200 MG-UNIT per tablet Take 1 tablet by mouth 2 times daily      Yes Historical Provider, MD   Multiple Vitamin TABS Take 1 tablet by mouth daily      Yes Historical Provider, MD            CURRENT Medications:    Current Meds Link used for Sign Out Report   No current facility-administered medications for this visit.          Allergies:  Nsaids, Morphine, Prochlorperazine, Valtrex [valacyclovir hcl], Morphine and related, and Tolmetin               Review of Systems: SEE HPI   · Constitutional: no unanticipated weight loss. There's been no change in energy level, sleep pattern, or activity level. No fevers, chills. · Eyes: No visual changes or diplopia. No scleral icterus. · ENT: No Headaches, hearing loss or vertigo. No mouth sores or sore throat. · Cardiovascular: No Chest pain, tightness or discomfort. · No Shortness of breath. No Dyspnea on exertion, Orthopnea, Paroxysmal nocturnal dyspnea or breathlessness at rest.  · No Palpitations. · No Syncope ('blackouts', 'faints', 'collapse') or dizziness. · Respiratory: No cough or wheezing, no sputum production. No hematemesis. · Gastrointestinal: No abdominal pain, appetite loss, blood in stools. No change in bowel or bladder habits. · Genitourinary: No dysuria, trouble voiding, or hematuria. · Musculoskeletal:  No gait disturbance, no joint complaints. · Integumentary: No rash or pruritis. · Neurological: No headache, diplopia, change in muscle strength, numbness or tingling.    · Psychiatric: No anxiety or depression. · Endocrine: No temperature intolerance. No excessive thirst, fluid intake, or urination. No tremor. · Hematologic/Lymphatic: No abnormal bruising or bleeding, blood clots or swollen lymph nodes. · Allergic/Immunologic: No nasal congestion or hives.        Objective:      PHYSICAL EXAM:      Vitals:    06/29/21 1204   BP: 122/82   Pulse: 74   Resp: 17   Temp: 98.4 °F (36.9 °C)   TempSrc: Skin   SpO2: 96%   Weight: 199 lb (90.3 kg)   Height: 6' (1.829 m)              Vitals:     05/13/21 1404   BP: 118/70   Pulse: 83   SpO2: 98%    Weight: 207 lb (93.9 kg)       General Appearance:  Alert, cooperative, no distress, appears stated age. Head:  Normocephalic, without obvious abnormality, atraumatic. Eyes:  Pupils equal and round. No scleral icterus. Mouth: Moist mucosa, no pharyngeal erythema. Nose: Nares normal. No drainage or sinus tenderness. Neck: Supple, symmetrical, trachea midline. No adenopathy. No tenderness/mass/nodules. No carotid bruit or elevated JVD. Lungs:   Respiratory Effort: Normal   Auscultation: Clear to auscultation bilaterally, respirations unlabored. No wheeze, rales   Chest Wall:  No tenderness or deformity. Cardiovascular:     Pulses  Palpation: normal   Ascultation: Regular rate, S1/ S2 normal. No murmur, rub, or gallop. 2+ radial and pedal pulses, symmetric  Carotid  Femoral   Abdomen and Gastrointestinal:   Soft, non-tender, bowel sounds active. Liver and Spleen  Masses   Musculoskeletal: No muscle wasting  Back  Gait   Extremities: Extremities normal, atraumatic. No cyanosis or edema. No cyanosis clubbing         Skin: Inspection and palpation performed, no rashes or lesions. Pysch: Normal mood and affect.  Alert and oriented to time place person   Neurologic: Normal gross motor and sensory exam.       Labs      All labs have been reviewed           Lab Results   Component Value Date     WBC 5.1 03/26/2021     RBC 3.73 03/26/2021     RBC 4.94 01/07/2015     HGB 10.6 03/26/2021     HCT 32.6 03/26/2021     MCV 87.4 03/26/2021     RDW 17.5 03/26/2021      03/26/2021            Lab Results   Component Value Date      03/26/2021     K 3.9 03/26/2021      03/26/2021     CO2 25 03/26/2021     BUN 7 03/26/2021     CREATININE 1.0 03/26/2021     GFRAA >60 03/26/2021     AGRATIO 1.3 03/26/2021     LABGLOM >60 03/26/2021     GLUCOSE 99 03/26/2021     GLUCOSE 84 01/07/2015     PROT 5.9 03/26/2021     PROT 6.7 01/07/2015     CALCIUM 9.0 03/26/2021     BILITOT 0.3 03/26/2021     ALKPHOS 163 03/26/2021     AST 13 03/26/2021     ALT 7 03/26/2021      No results found for: PTINR        Lab Results   Component Value Date     LABA1C 5.1 02/09/2021            Lab Results   Component Value Date     CKTOTAL 56 09/07/2020     CKMB 0.9 06/18/2014     TROPONINI <0.01 03/26/2021         Cardiac, Vascular and Imaging Data all Personally Reviewed in Detail by Myself       EKG:      Echocardiogram:   ECHO 4/27/2021  Ejection fraction is visually estimated to be 55-60%. Normal diastolic function. Moderately dilated right ventricle. Right ventricular systolic function is normal .  A bubble study was performed and shows evidence of right to left shunting  consistent with a patent foramen ovale or atrial septal defect.        Stress Test:      Cath:     Other imaging:   MRI Brain 4/1/2021  1. No acute infarct or acute intracranial process identified.    2. Remote infarct involving the posterior right insular cortex and right   parietal lobe, unchanged.      Carotid doppler 4/1/2021  Duplex sonography with color flow enhancement was performed bilaterally on    the cervical carotid system.    No evidence of hemodynamically significant stenosis in the bilateral carotid    and vertebral arteries.    Elevated velocities in the bilateral common carotid arteries with normal    waveforms being present; likely normal variant      30 day event monitor 3/30/2021  Baseline sinus rhythm    Assessment and Plan      CVA/TIA multiple episodes.     Remote infarct involving the posterior right insular cortex and right parietal lobe. 30 day monitor did not show any arrhythmia. ASA 2  Mallampati 2       PFO  Right to left shunting noted on the recent echocardiogram. I will schedule him for KRYSTAL to evaluate the PFO. After the KRYSTAL I will schedule him for PFO closure (4-6 weeks). Explained to him in detail regarding transesophageal echocardiogram.     DVT   Continue Xarelto.      Nicotine addiction  Encouraged smoking cessation.      Follow up in 4 months.      Thank you for allowing us to participate in the care of Benjamen Pulling.   Please do not hesitate to contact me if you have any questions.     Frank David MD, MPH     [facility], [facility] [address]  Ph: [phone]  Fax: [phone]

## 2021-07-02 ENCOUNTER — TELEPHONE (OUTPATIENT)
Dept: CARDIOLOGY CLINIC | Age: 51
End: 2021-07-02

## 2021-07-02 NOTE — LETTER
[place] Iggy  1970    PFO closure scheduled for 8/5/2021 at 7:30  Arrive at 6:00    [facility]   [address] [place] [address]     The morning of your procedure you will park in the hospital parking lot and report directly to the cath lab to check in.     Pre-Procedure Instructions   1. You will need to fast for at least 8 hours prior to procedure. No caffeine the morning of.   2. You will need to hold your anticoagulation, Xarelto for 2 days prior. 3. All other medications can be taken in the morning with sips of water. 4. Do not use any lotions, creams or perfume the morning of procedure. 5. Pre-procedure lab work will need to be completed 5-7 days prior to procedure. 6. Please have a responsible adult to drive you home after procedure. We advise you have someone to stay with you for 24 hours following procedure for precautionary measures. Depending on procedure you may require an overnight stay. 7. Cath lab will provide you with all post procedure instructions. If you have any questions regarding the procedure itself or medications, please call [phone] and ask to speak with a nurse. If you have had your Covid vaccine please bring proof. If you have not had it please call the office so we can discuss how to get Covid testing prior to procedure.

## 2021-07-06 NOTE — TELEPHONE ENCOUNTER
PFO closure scheduled for 8/5/2021 at 7:30  Arrive at 6:00    [facility]   [address]     The morning of your procedure you will park in the hospital parking lot and report directly to the cath lab to check in.     Pre-Procedure Instructions   1. You will need to fast for at least 8 hours prior to procedure. No caffeine the morning of.   2. You will need to hold your anticoagulation, Xarelto for 2 days prior. 3. All other medications can be taken in the morning with sips of water. 4. Do not use any lotions, creams or perfume the morning of procedure. 5. Pre-procedure lab work will need to be completed 5-7 days prior to procedure. 6. Please have a responsible adult to drive you home after procedure. We advise you have someone to stay with you for 24 hours following procedure for precautionary measures. Depending on procedure you may require an overnight stay. 7. Cath lab will provide you with all post procedure instructions. If you have any questions regarding the procedure itself or medications, please call [phone] and ask to speak with a nurse. Spoke with patient and he is agreeable. Also spoke with Kemar in the cath lab at [facility].    Email sent to PFO rep, MD's and Nhi Ewing RN

## 2021-07-07 ENCOUNTER — TELEMEDICINE (OUTPATIENT)
Dept: FAMILY MEDICINE CLINIC | Age: 51
End: 2021-07-07
Payer: COMMERCIAL

## 2021-07-07 DIAGNOSIS — I82.463 ACUTE DEEP VEIN THROMBOSIS (DVT) OF CALF MUSCLE VEIN OF BOTH LOWER EXTREMITIES (HCC): ICD-10-CM

## 2021-07-07 DIAGNOSIS — Q21.12 PFO (PATENT FORAMEN OVALE): ICD-10-CM

## 2021-07-07 DIAGNOSIS — R40.20 LOC (LOSS OF CONSCIOUSNESS) (HCC): Primary | ICD-10-CM

## 2021-07-07 DIAGNOSIS — G45.9 TIA (TRANSIENT ISCHEMIC ATTACK): ICD-10-CM

## 2021-07-07 PROCEDURE — 99214 OFFICE O/P EST MOD 30 MIN: CPT | Performed by: FAMILY MEDICINE

## 2021-07-07 RX ORDER — ATORVASTATIN CALCIUM 40 MG/1
40 TABLET, FILM COATED ORAL NIGHTLY
Qty: 90 TABLET | Refills: 1 | Status: SHIPPED | OUTPATIENT
Start: 2021-07-07 | End: 2021-11-17
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
## 2021-07-14 ENCOUNTER — HOSPITAL ENCOUNTER (EMERGENCY)
Age: 51
Discharge: HOME | End: 2021-07-14
Attending: EMERGENCY MEDICINE
Payer: COMMERCIAL

## 2021-07-14 ENCOUNTER — APPOINTMENT (OUTPATIENT)
Dept: CT IMAGING | Age: 51
End: 2021-07-14
Payer: COMMERCIAL

## 2021-07-14 VITALS
OXYGEN SATURATION: 95 % | HEART RATE: 82 BPM | TEMPERATURE: 98.4 F | HEIGHT: 72 IN | WEIGHT: 214.29 LBS | RESPIRATION RATE: 18 BRPM | BODY MASS INDEX: 29.02 KG/M2 | DIASTOLIC BLOOD PRESSURE: 82 MMHG | SYSTOLIC BLOOD PRESSURE: 138 MMHG
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
## 2021-07-14 DIAGNOSIS — M54.2 NECK PAIN: ICD-10-CM

## 2021-07-14 PROCEDURE — 70450 CT HEAD/BRAIN W/O DYE: CPT

## 2021-07-14 PROCEDURE — 6370000000 HC RX 637 (ALT 250 FOR IP): Performed by: EMERGENCY MEDICINE

## 2021-07-14 PROCEDURE — 99283 EMERGENCY DEPT VISIT LOW MDM: CPT

## 2021-07-14 PROCEDURE — 72125 CT NECK SPINE W/O DYE: CPT

## 2021-07-14 RX ORDER — CYCLOBENZAPRINE HCL 10 MG
10 TABLET ORAL ONCE
Status: COMPLETED | OUTPATIENT
Start: 2021-07-14 | End: 2021-07-14

## 2021-07-14 RX ORDER — CYCLOBENZAPRINE HCL 10 MG
10 TABLET ORAL 2 TIMES DAILY PRN
Qty: 20 TABLET | Refills: 0 | Status: SHIPPED | OUTPATIENT
Start: 2021-07-14 | End: 2021-07-23

## 2021-07-14 RX ADMIN — CYCLOBENZAPRINE 10 MG: 10 TABLET, FILM COATED ORAL at 13:43

## 2021-07-14 ASSESSMENT — PAIN - FUNCTIONAL ASSESSMENT: PAIN_FUNCTIONAL_ASSESSMENT: 0-10

## 2021-07-14 ASSESSMENT — ENCOUNTER SYMPTOMS
NAUSEA: 0
VOICE CHANGE: 0
SHORTNESS OF BREATH: 0
FACIAL SWELLING: 0
TROUBLE SWALLOWING: 0
BACK PAIN: 0
ABDOMINAL PAIN: 0
BLOOD IN STOOL: 0
WHEEZING: 0
COLOR CHANGE: 0
PHOTOPHOBIA: 0
STRIDOR: 0
VOMITING: 0

## 2021-07-14 ASSESSMENT — PAIN DESCRIPTION - PROGRESSION: CLINICAL_PROGRESSION: GRADUALLY IMPROVING

## 2021-07-14 ASSESSMENT — PAIN DESCRIPTION - LOCATION
LOCATION: NECK
LOCATION: NECK

## 2021-07-14 ASSESSMENT — PAIN DESCRIPTION - ORIENTATION
ORIENTATION: LEFT
ORIENTATION: POSTERIOR

## 2021-07-14 ASSESSMENT — PAIN DESCRIPTION - PAIN TYPE
TYPE: ACUTE PAIN
TYPE: ACUTE PAIN

## 2021-07-14 ASSESSMENT — PAIN SCALES - GENERAL
PAINLEVEL_OUTOF10: 7
PAINLEVEL_OUTOF10: 6

## 2021-07-14 ASSESSMENT — PAIN DESCRIPTION - DESCRIPTORS
DESCRIPTORS: SHARP
DESCRIPTORS: DISCOMFORT

## 2021-07-14 ASSESSMENT — PAIN DESCRIPTION - FREQUENCY: FREQUENCY: CONTINUOUS

## 2021-07-14 ASSESSMENT — PAIN DESCRIPTION - ONSET: ONSET: GRADUAL

## 2021-07-14 NOTE — ED NOTES
Discharge and education instructions reviewed. Patient verbalized understanding, teach-back successful. Patient denied questions at this time. No acute distress noted. Patient instructed to follow-up as noted - return to emergency department if symptoms worsen. Patient verbalized understanding. Discharged per EDMD with discharge instructions.         Blanca Monaco RN  07/14/21 7308
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
## 2021-07-15 ENCOUNTER — CARE COORDINATION (OUTPATIENT)
Dept: CARE COORDINATION | Age: 51
End: 2021-07-15

## 2021-07-20 NOTE — PATIENT INSTRUCTIONS
PFO closure scheduled for 8/5/2021 at 7:30  Arrive at 6:00     [facility]   [address]      The morning of your procedure you will park in the hospital parking lot and report directly to the cath lab to check in.      Pre-Procedure Instructions   1. You will need to fast for at least 8 hours prior to procedure. No caffeine the morning of.   2. You will need to hold your anticoagulation, Xarelto for 2 days prior. 3. All other medications can be taken in the morning with sips of water. 4. Do not use any lotions, creams or perfume the morning of procedure. 5. Pre-procedure lab work will need to be completed 5-7 days prior to procedure. 6. Please have a responsible adult to drive you home after procedure. We advise you have someone to stay with you for 24 hours following procedure for precautionary measures. Depending on procedure you may require an overnight stay. 7. Cath lab will provide you with all post procedure instructions.      If you have any questions regarding the procedure itself or medications, please call [phone] and ask to speak with a nurse.

## 2021-07-20 NOTE — PROGRESS NOTES
[facility], [place], operated by [facility]  Cardiology Consult    Duarte Espana  1970 July 23, 2021      Reason for Referral: PFO    CC: \"I am doing well. \"      Subjective:     History of Present Illness:    Duarte Espana is a 49 y.o. patient with a PMH significant for nicotine addiction and CVA (2014). He was seen by his primary care provider 3/10/2021 after and episode of left hand weakness. An outpatient MRI and carotid doppler we ordered along with a referral to cardiology. He was evaluated in the emergency room on 3/28/78819 with leg pain nad was found to have a DVT and started on Xarelto. Today, he is here to discuss KRYSTAL. He denies having any recurrence of stroke like symptoms. He has been doing well. Patient denies exertional chest pain/pressure, dyspnea at rest, SALAZAR, PND, orthopnea, palpitations, lightheadedness, weight changes, changes in LE edema, and syncope. Patient reports compliance to his medications. Past Medical History:   has a past medical history of Alcoholism ([address] Allergic migraine with status migrainosus, Allergic rhinitis, seasonal, Anemia, Arthritis, Vaughn's esophagus, Benign intracranial hypertension, Cancer ([address] Carpal tunnel syndrome, Chronic pain, Depression, GERD (gastroesophageal reflux disease), Headache(784.0), History of blood transfusion, History of tobacco use, Migraine, Morbid obesity ([address] Movement disorder, Onychomycosis, Sleep apnea, obstructive, Unspecified cerebral artery occlusion with cerebral infarction, Use of cane as ambulatory aid, Wears dentures, and Wears glasses. Surgical History:   has a past surgical history that includes Gastric bypass surgery (Jan 2009); Splenectomy; LASIK (2005); knee surgery (July 2012); Carpal tunnel release; laparotomy; Knee arthroscopy (Right, 7/11/2013); Knee arthroscopy (Right, 7/11/12); Total knee arthroplasty (10/15/13); Endoscopy, colon, diagnostic; Dilatation, esophagus; eye surgery; joint replacement;  Abdominal exploration surgery (1/11/16); other surgical history (Left, 03/08/2016); hernia repair (3-); Upper gastrointestinal endoscopy (6-7-2016); other surgical history (2016); Upper gastrointestinal endoscopy (04/02/2018); Kidney removal (Left, 08/01/2018); Abdomen surgery; Abdomen surgery (4-7-2016); Revision total knee arthroplasty (Right, 12/17/2019); Revision total knee arthroplasty (Right, 1/22/2020); knee surgery (Right, 2/18/2020); Total knee arthroplasty (Right, 8/12/2020); Revision total knee arthroplasty (Right, 2/16/2021); knee surgery (Right, 2/26/2021); and knee surgery (Right, 3/5/2021). Social History:   reports that he quit smoking about 2 months ago. His smoking use included cigarettes. He has a 12.50 pack-year smoking history. He has never used smokeless tobacco. He reports current drug use. Drug: Marijuana. He reports that he does not drink alcohol. Family History:  family history includes Arthritis in his mother; Cancer in his father; Diabetes in his maternal uncle; Heart Disease in his maternal uncle and maternal uncle. Home Medications:  Were reviewed and are listed in nursing record and/or below  Prior to Admission medications    Medication Sig Start Date End Date Taking? Authorizing Provider   rivaroxaban (XARELTO) 20 MG TABS tablet Take 1 tablet by mouth daily (with breakfast) 7/7/21  Yes Shae Pena MD   atorvastatin (LIPITOR) 40 MG tablet Take 1 tablet by mouth nightly At bedtime 7/7/21  Yes Shae Pena MD   FLUoxetine (PROZAC) 20 MG capsule TAKE 1 CAPSULE BY MOUTH DAILY 6/25/21  Yes Shae Pena MD   zolpidem (AMBIEN) 10 MG tablet Take 1 tablet by mouth nightly as needed (insomnia) for up to 90 days.  6/7/21 9/5/21 Yes Tiffanie R Kehrt, APRN - CNP   aspirin (ASPIRIN CHILDRENS) 81 MG chewable tablet Take 1 tablet by mouth daily 4/1/21  Yes Shae Pena MD   traZODone (DESYREL) 100 MG tablet Take 2 tablets by mouth nightly 3/16/21  Yes Shannan Edwards MD   sildenafil (REVATIO) 20 MG tablet Take 4 tablets by mouth as needed (30 min prior to intercourse) 1/11/21  Yes Ronda Virgen MD   pantoprazole (PROTONIX) 40 MG tablet Take 1 tablet by mouth 2 times daily 2/28/20  Yes Ronda Virgen MD   calcium-vitamin D (OSCAL 500/200 D-3) 500-200 MG-UNIT per tablet Take 1 tablet by mouth 2 times daily    Yes Historical Provider, MD   Multiple Vitamin TABS Take 1 tablet by mouth daily    Yes Historical Provider, MD        CURRENT Medications:  No current facility-administered medications for this visit. Allergies:  Nsaids, Morphine, Prochlorperazine, Valtrex [valacyclovir hcl], Morphine and related, and Tolmetin           Review of Systems: SEE HPI   · Constitutional: no unanticipated weight loss. There's been no change in energy level, sleep pattern, or activity level. No fevers, chills. · Eyes: No visual changes or diplopia. No scleral icterus. · ENT: No Headaches, hearing loss or vertigo. No mouth sores or sore throat. · Cardiovascular: No Chest pain, tightness or discomfort.  No Shortness of breath. No Dyspnea on exertion, Orthopnea, Paroxysmal nocturnal dyspnea or breathlessness at rest.   No Palpitations.  No Syncope ('blackouts', 'faints', 'collapse') or dizziness. · Respiratory: No cough or wheezing, no sputum production. No hematemesis. · Gastrointestinal: No abdominal pain, appetite loss, blood in stools. No change in bowel or bladder habits. · Genitourinary: No dysuria, trouble voiding, or hematuria. · Musculoskeletal:  No gait disturbance, no joint complaints. · Integumentary: No rash or pruritis. · Neurological: No headache, diplopia, change in muscle strength, numbness or tingling. · Psychiatric: No anxiety or depression. · Endocrine: No temperature intolerance. No excessive thirst, fluid intake, or urination. No tremor.   · Hematologic/Lymphatic: No abnormal bruising or bleeding, blood clots or swollen lymph nodes.  · Allergic/Immunologic: No nasal congestion or hives. Objective:     PHYSICAL EXAM:      Vitals:    07/23/21 0923   BP: 122/70   Pulse: 92   SpO2: 98%    Weight: 209 lb 9.6 oz (95.1 kg)       General Appearance:  Alert, cooperative, no distress, appears stated age. Head:  Normocephalic, without obvious abnormality, atraumatic. Eyes:  Pupils equal and round. No scleral icterus. Mouth: Moist mucosa, no pharyngeal erythema. Nose: Nares normal. No drainage or sinus tenderness. Neck: Supple, symmetrical, trachea midline. No adenopathy. No tenderness/mass/nodules. No carotid bruit or elevated JVD. Lungs:   Respiratory Effort: Normal   Auscultation: Clear to auscultation bilaterally, respirations unlabored. No wheeze, rales   Chest Wall:  No tenderness or deformity. Cardiovascular:    Pulses  Palpation: normal   Ascultation: Regular rate, S1/ S2 normal. No murmur, rub, or gallop. 2+ radial and pedal pulses, symmetric  Carotid  Femoral   Abdomen and Gastrointestinal:   Soft, non-tender, bowel sounds active. Liver and Spleen  Masses   Musculoskeletal: No muscle wasting  Back  Gait   Extremities: Extremities normal, atraumatic. No cyanosis or edema. No cyanosis clubbing       Skin: Inspection and palpation performed, no rashes or lesions. Pysch: Normal mood and affect.  Alert and oriented to time place person   Neurologic: Normal gross motor and sensory exam.       Labs     All labs have been reviewed    Lab Results   Component Value Date    WBC 5.3 06/25/2021    RBC 4.26 06/25/2021    RBC 4.94 01/07/2015    HGB 12.0 06/25/2021    HCT 36.5 06/25/2021    MCV 85.7 06/25/2021    RDW 21.9 06/25/2021     06/25/2021     Lab Results   Component Value Date     06/25/2021    K 4.0 06/25/2021    K 4.4 06/22/2021     06/25/2021    CO2 23 06/25/2021    BUN 6 06/25/2021    CREATININE 0.8 06/25/2021    GFRAA >60 06/25/2021    AGRATIO 1.7 06/25/2021    LABGLOM >60 06/25/2021    GLUCOSE 109 06/25/2021    GLUCOSE 84 01/07/2015    PROT 6.4 06/25/2021    PROT 6.7 01/07/2015    CALCIUM 9.3 06/25/2021    BILITOT 0.5 06/25/2021    ALKPHOS 100 06/25/2021    AST 11 06/25/2021    ALT 8 06/25/2021     No results found for: [employer] [facility]  Lab Results   Component Value Date    LABA1C 5.1 02/09/2021     Lab Results   Component Value Date    CKTOTAL 56 09/07/2020    CKMB 0.9 06/18/2014    TROPONINI <0.01 03/26/2021       Cardiac, Vascular and Imaging Data all Personally Reviewed in Detail by Myself      EKG:     Echocardiogram:   ECHO 4/27/2021  Ejection fraction is visually estimated to be 55-60%. Normal diastolic function. Moderately dilated right ventricle. Right ventricular systolic function is normal .  A bubble study was performed and shows evidence of right to left shunting  consistent with a patent foramen ovale or atrial septal defect. Stress Test:     Cath: Other imaging:   MRI Brain 4/1/2021  1. No acute infarct or acute intracranial process identified. 2. Remote infarct involving the posterior right insular cortex and right   parietal lobe, unchanged. Carotid doppler 4/1/2021  Duplex sonography with color flow enhancement was performed bilaterally on    the cervical carotid system.    No evidence of hemodynamically significant stenosis in the bilateral carotid    and vertebral arteries.    Elevated velocities in the bilateral common carotid arteries with normal    waveforms being present; likely normal variant     30 day event monitor 3/30/2021  Baseline sinus rhythm     KRYSTAL 6/29/2021  LV Function is normal  Mitral and aortic valve appear normal  A bubble study was performed and shows evidence of right to left shunting  consistent with a patent foramen ovale or atrial septal defect. Assessment and Plan     CVA/TIA multiple episodes. Remote infarct involving the posterior right insular cortex and right parietal lobe. 30 day monitor did not show any arrhythmia. No recurrence of symptoms. PFO  KRYSTAL showed evidence of right to left shunting consistent with PFO. PFO closure scheduled for 8/5/2021. I have explained to him the risks and benefits and he is willing to proceed. DVT   Continue Xarelto. Nicotine addiction  Encouraged smoking cessation. Follow up in 1 month. Thank you for allowing us to participate in the care of Nomi Gentile. Please do not hesitate to contact me if you have any questions. Aki Salinas MD, MPH    [address] [place] [address]  Ph: [phone]  Fax: [phone]    This note was scribed in the presence of Dr Brittany Chi, by Tonja [facility] RN  Physician Attestation:  The scribes documentation has been prepared under my direction and personally reviewed by me in its entirety. I confirm that the note above accurately reflects all work, treatment, procedures, and medical decision making performed by me.

## 2021-07-23 ENCOUNTER — OFFICE VISIT (OUTPATIENT)
Dept: CARDIOLOGY CLINIC | Age: 51
End: 2021-07-23
Payer: COMMERCIAL

## 2021-07-23 ENCOUNTER — OFFICE VISIT (OUTPATIENT)
Dept: PSYCHIATRY | Age: 51
End: 2021-07-23

## 2021-07-23 VITALS
BODY MASS INDEX: 28.39 KG/M2 | HEIGHT: 72 IN | HEART RATE: 92 BPM | WEIGHT: 209.6 LBS | DIASTOLIC BLOOD PRESSURE: 70 MMHG | OXYGEN SATURATION: 98 % | SYSTOLIC BLOOD PRESSURE: 122 MMHG

## 2021-07-23 VITALS
WEIGHT: 211 LBS | DIASTOLIC BLOOD PRESSURE: 80 MMHG | HEIGHT: 72 IN | OXYGEN SATURATION: 98 % | HEART RATE: 92 BPM | SYSTOLIC BLOOD PRESSURE: 422 MMHG | BODY MASS INDEX: 28.58 KG/M2

## 2021-07-23 DIAGNOSIS — Q21.12 PFO (PATENT FORAMEN OVALE): ICD-10-CM

## 2021-07-23 DIAGNOSIS — G45.9 TIA (TRANSIENT ISCHEMIC ATTACK): Primary | ICD-10-CM

## 2021-07-23 DIAGNOSIS — F32.A DEPRESSION, UNSPECIFIED DEPRESSION TYPE: Primary | ICD-10-CM

## 2021-07-23 DIAGNOSIS — Z86.73 HISTORY OF CVA (CEREBROVASCULAR ACCIDENT): ICD-10-CM

## 2021-07-23 DIAGNOSIS — F17.200 SMOKING ADDICTION: ICD-10-CM

## 2021-07-23 DIAGNOSIS — I82.90 DEEP VEIN THROMBOSIS (DVT) OF NON-EXTREMITY VEIN, UNSPECIFIED CHRONICITY: ICD-10-CM

## 2021-07-23 PROCEDURE — 99213 OFFICE O/P EST LOW 20 MIN: CPT | Performed by: PSYCHIATRY & NEUROLOGY

## 2021-07-23 PROCEDURE — 99214 OFFICE O/P EST MOD 30 MIN: CPT | Performed by: INTERNAL MEDICINE

## 2021-07-23 RX ORDER — TRAZODONE HYDROCHLORIDE 100 MG/1
200 TABLET ORAL NIGHTLY
Qty: 180 TABLET | Refills: 1 | Status: SHIPPED | OUTPATIENT
Start: 2021-07-23 | End: 2021-09-28 | Stop reason: SDUPTHER

## 2021-07-23 NOTE — PROGRESS NOTES
Psych Follow Up Progress Note    7/23/21  Pura Davis  [id number]    I have reviewed recent documentation:  Pura Davis is a 49 y.o. male  This patient  has a past medical history of Alcoholism ([facility] [address] Allergic migraine with status migrainosus, Allergic rhinitis, seasonal, Anemia, Arthritis, Vaughn's esophagus, Benign intracranial hypertension, Cancer ([facility] [address] Carpal tunnel syndrome, Chronic pain, Depression, GERD (gastroesophageal reflux disease), Headache(784.0), History of blood transfusion, History of tobacco use, Migraine, Morbid obesity ([facility] [address] Movement disorder, Onychomycosis, Sleep apnea, obstructive, Unspecified cerebral artery occlusion with cerebral infarction, Use of cane as ambulatory aid, Wears dentures, and Wears glasses. Chief Complaint   Patient presents with    Depression     Subjective/Interval Hx:  Just got back from [place], had a nice family vacation. Knee health has been good. Gut health not so great. Has lost quite a bit of weight. Mood not too terrible, though! Live music and shows are waking up a bit. No trouble with sleep until running out of trazodone. Location:  Mind  Severity:  Mild at this point. Context:  As above. Modifiers:  meds helpful  Quality:  anxiety    Objective:  Vitals:    07/23/21 1501   BP: (!) 422/80   Pulse: 92   SpO2: 98%   Weight: 211 lb (95.7 kg)   Height: 6' (1.829 m)        Body mass index is 28.62 kg/m². No results found for this or any previous visit (from the past 168 hour(s)). Current Outpatient Medications   Medication Sig Dispense Refill    rivaroxaban (XARELTO) 20 MG TABS tablet Take 1 tablet by mouth daily (with breakfast) 30 tablet 2    atorvastatin (LIPITOR) 40 MG tablet Take 1 tablet by mouth nightly At bedtime 90 tablet 1    FLUoxetine (PROZAC) 20 MG capsule TAKE 1 CAPSULE BY MOUTH DAILY 90 capsule 1    zolpidem (AMBIEN) 10 MG tablet Take 1 tablet by mouth nightly as needed (insomnia) for up to 90 days.  90 tablet 1    aspirin (ASPIRIN CHILDRENS) 81 MG chewable tablet Take 1 tablet by mouth daily  0    traZODone (DESYREL) 100 MG tablet Take 2 tablets by mouth nightly 180 tablet 0    sildenafil (REVATIO) 20 MG tablet Take 4 tablets by mouth as needed (30 min prior to intercourse) 30 tablet 0    pantoprazole (PROTONIX) 40 MG tablet Take 1 tablet by mouth 2 times daily      calcium-vitamin D (OSCAL 500/200 D-3) 500-200 MG-UNIT per tablet Take 1 tablet by mouth 2 times daily       Multiple Vitamin TABS Take 1 tablet by mouth daily        No current facility-administered medications for this visit. ROS:  No tremor, gait nl    MSE:    A-casually dressed, good EC, pleasant and engageable  A-full  M-euthymic  S-grossly A + O  I/J-fair/fair  T-linear, goal-directed. No S/H I, no A/V H. Speech c nl r/t/v/a.    53 y.o. M c     1.  Depression NOS-Stable.  Only on prozac 20 and trazodone 200 at this point!  Which I will happily refill.  F/u in 6-9 months.       2.  Etoh abuse-Sober x 7 years! I have spent >25 mins with this patient on working on coping skills and med mgt, and > 1/2 of that time was spent counseling this pt.

## 2021-07-30 DIAGNOSIS — Z01.812 PRE-PROCEDURE LAB EXAM: ICD-10-CM

## 2021-07-30 DIAGNOSIS — Q21.12 PFO (PATENT FORAMEN OVALE): Primary | ICD-10-CM

## 2021-07-30 LAB
ANION GAP SERPL CALCULATED.3IONS-SCNC: 9 MMOL/L (ref 3–16)
BASOPHILS ABSOLUTE: 0.1 K/UL (ref 0–0.2)
BASOPHILS RELATIVE PERCENT: 1.9 %
BUN BLDV-MCNC: 7 MG/DL (ref 7–20)
CALCIUM SERPL-MCNC: 9.3 MG/DL (ref 8.3–10.6)
CHLORIDE BLD-SCNC: 102 MMOL/L (ref 99–110)
CO2: 28 MMOL/L (ref 21–32)
CREAT SERPL-MCNC: 1 MG/DL (ref 0.9–1.3)
EOSINOPHILS ABSOLUTE: 0.2 K/UL (ref 0–0.6)
EOSINOPHILS RELATIVE PERCENT: 3.1 %
GFR AFRICAN AMERICAN: >60
GFR NON-AFRICAN AMERICAN: >60
GLUCOSE BLD-MCNC: 86 MG/DL (ref 70–99)
HCT VFR BLD CALC: 40.3 % (ref 40.5–52.5)
HEMOGLOBIN: 13.5 G/DL (ref 13.5–17.5)
LYMPHOCYTES ABSOLUTE: 2 K/UL (ref 1–5.1)
LYMPHOCYTES RELATIVE PERCENT: 39.8 %
MCH RBC QN AUTO: 29.7 PG (ref 26–34)
MCHC RBC AUTO-ENTMCNC: 33.6 G/DL (ref 31–36)
MCV RBC AUTO: 88.5 FL (ref 80–100)
MONOCYTES ABSOLUTE: 0.4 K/UL (ref 0–1.3)
MONOCYTES RELATIVE PERCENT: 8.2 %
NEUTROPHILS ABSOLUTE: 2.3 K/UL (ref 1.7–7.7)
NEUTROPHILS RELATIVE PERCENT: 47 %
PDW BLD-RTO: 20.2 % (ref 12.4–15.4)
PLATELET # BLD: 327 K/UL (ref 135–450)
PMV BLD AUTO: 8.7 FL (ref 5–10.5)
POTASSIUM SERPL-SCNC: 4.6 MMOL/L (ref 3.5–5.1)
RBC # BLD: 4.55 M/UL (ref 4.2–5.9)
SODIUM BLD-SCNC: 139 MMOL/L (ref 136–145)
WBC # BLD: 4.9 K/UL (ref 4–11)

## 2021-08-03 ENCOUNTER — PATIENT MESSAGE (OUTPATIENT)
Dept: FAMILY MEDICINE CLINIC | Age: 51
End: 2021-08-03

## 2021-08-03 DIAGNOSIS — S32.010A COMPRESSION FRACTURE OF L1 VERTEBRA, INITIAL ENCOUNTER (HCC): Primary | ICD-10-CM

## 2021-08-03 DIAGNOSIS — R10.9 ABDOMINAL PAIN, UNSPECIFIED ABDOMINAL LOCATION: ICD-10-CM

## 2021-08-03 NOTE — TELEPHONE ENCOUNTER
From: Armen Quarles  To: Francisco Clemons MD  Sent: 8/3/2021 9:50 AM EDT  Subject: Visit Katarzyna Smimons around 10:30 I had a weird moment where I passed out and landed hard on the floor. I went to [place] [address] ER. The imaging they did showed no signs of any stroke-like occurrence and as my othostatic vitals were off the ER doctor suspects my blood pressure was the culprit. In the fall, they found on a CT of my back that I caused a compression fracture in my L4 vertebra. I already have an appointment on the 18th with [facility] to speak with a neurologist about the stroke and TIAs but the ER doc also said I should follow up with you and consult with a neurosurgeon to look at the imaging and see if anything needs to be done other than resting and letting it heal on its own. I didn't know if you would want me to schedule a visit with you. From what I see on [facility] you're pretty solidly booked into September. I have an appointment with the neurosurgeon on 8/24 but in the meantime things are very painful. In addition to the fracture my lower back muscles are in almost constant spasm. The ER doc wrote a prescription for Percocet but just enough to get me through the next couple of days. Any thoughts or ideas are much appreciated as always.      Reinier Castillo

## 2021-08-04 NOTE — PRE-PROCEDURE INSTRUCTIONS
Called patient about procedure. Told to be here at 0600 for procedure at 0730. Must be NPO after midnight but can take morning medication with sips of water. Patient stated they stopped their xarelto as directed. Told to have a responsible adult with them to take them home and stay with them afterwards, if they do not get admitted to hospital. Also, to bring a current list of medications. No other questions or concerns.

## 2021-08-05 ENCOUNTER — HOSPITAL ENCOUNTER (OUTPATIENT)
Dept: INTERVENTIONAL RADIOLOGY/VASCULAR | Age: 51
Discharge: HOME | End: 2021-08-05
Payer: COMMERCIAL

## 2021-08-05 VITALS — WEIGHT: 215 LBS | TEMPERATURE: 98.5 F | BODY MASS INDEX: 29.12 KG/M2 | HEIGHT: 72 IN

## 2021-08-05 LAB
EKG ATRIAL RATE: 61 BPM
EKG DIAGNOSIS: NORMAL
EKG P AXIS: 58 DEGREES
EKG P-R INTERVAL: 156 MS
EKG Q-T INTERVAL: 410 MS
EKG QRS DURATION: 86 MS
EKG QTC CALCULATION (BAZETT): 412 MS
EKG R AXIS: 6 DEGREES
EKG T AXIS: 44 DEGREES
EKG VENTRICULAR RATE: 61 BPM
INR BLD: 1.16 (ref 0.88–1.12)
POC ACT LR: 328 SEC
POC ACT LR: 344 SEC
PROTHROMBIN TIME: 13.2 SEC (ref 9.9–12.7)

## 2021-08-05 PROCEDURE — C1759 CATH, INTRA ECHOCARDIOGRAPHY: HCPCS

## 2021-08-05 PROCEDURE — 93662 INTRACARDIAC ECG (ICE): CPT | Performed by: INTERNAL MEDICINE

## 2021-08-05 PROCEDURE — 85347 COAGULATION TIME ACTIVATED: CPT

## 2021-08-05 PROCEDURE — C8923 2D TTE W OR W/O FOL W/CON,CO: HCPCS

## 2021-08-05 PROCEDURE — 93580 TRANSCATH CLOSURE OF ASD: CPT | Performed by: INTERNAL MEDICINE

## 2021-08-05 PROCEDURE — 6370000000 HC RX 637 (ALT 250 FOR IP): Performed by: INTERNAL MEDICINE

## 2021-08-05 PROCEDURE — C1817 SEPTAL DEFECT IMP SYS: HCPCS

## 2021-08-05 PROCEDURE — C1894 INTRO/SHEATH, NON-LASER: HCPCS

## 2021-08-05 PROCEDURE — 93662 INTRACARDIAC ECG (ICE): CPT

## 2021-08-05 PROCEDURE — 2709999900 HC NON-CHARGEABLE SUPPLY

## 2021-08-05 PROCEDURE — 85610 PROTHROMBIN TIME: CPT

## 2021-08-05 PROCEDURE — 93325 DOPPLER ECHO COLOR FLOW MAPG: CPT

## 2021-08-05 PROCEDURE — 93005 ELECTROCARDIOGRAM TRACING: CPT

## 2021-08-05 PROCEDURE — 99152 MOD SED SAME PHYS/QHP 5/>YRS: CPT

## 2021-08-05 PROCEDURE — C1769 GUIDE WIRE: HCPCS

## 2021-08-05 PROCEDURE — 93580 TRANSCATH CLOSURE OF ASD: CPT

## 2021-08-05 PROCEDURE — 99153 MOD SED SAME PHYS/QHP EA: CPT

## 2021-08-05 RX ORDER — SODIUM CHLORIDE 9 MG/ML
25 INJECTION, SOLUTION INTRAVENOUS PRN
Status: DISCONTINUED | OUTPATIENT
Start: 2021-08-05 | End: 2021-08-06 | Stop reason: HOSPADM

## 2021-08-05 RX ORDER — OXYCODONE HYDROCHLORIDE AND ACETAMINOPHEN 5; 325 MG/1; MG/1
1 TABLET ORAL ONCE
Status: COMPLETED | OUTPATIENT
Start: 2021-08-05 | End: 2021-08-05

## 2021-08-05 RX ORDER — SODIUM CHLORIDE 0.9 % (FLUSH) 0.9 %
5-40 SYRINGE (ML) INJECTION EVERY 12 HOURS SCHEDULED
Status: DISCONTINUED | OUTPATIENT
Start: 2021-08-05 | End: 2021-08-06 | Stop reason: HOSPADM

## 2021-08-05 RX ORDER — PROTAMINE SULFATE 10 MG/ML
30 INJECTION, SOLUTION INTRAVENOUS ONCE
Status: DISCONTINUED | OUTPATIENT
Start: 2021-08-05 | End: 2021-08-06 | Stop reason: HOSPADM

## 2021-08-05 RX ORDER — SODIUM CHLORIDE 9 MG/ML
INJECTION, SOLUTION INTRAVENOUS CONTINUOUS
Status: DISCONTINUED | OUTPATIENT
Start: 2021-08-05 | End: 2021-08-06 | Stop reason: HOSPADM

## 2021-08-05 RX ORDER — CLOPIDOGREL BISULFATE 75 MG/1
75 TABLET ORAL DAILY
Qty: 90 TABLET | Refills: 1 | Status: SHIPPED | OUTPATIENT
Start: 2021-08-05 | End: 2021-09-16 | Stop reason: SDUPTHER

## 2021-08-05 RX ORDER — SODIUM CHLORIDE 0.9 % (FLUSH) 0.9 %
5-40 SYRINGE (ML) INJECTION PRN
Status: DISCONTINUED | OUTPATIENT
Start: 2021-08-05 | End: 2021-08-06 | Stop reason: HOSPADM

## 2021-08-05 RX ORDER — ONDANSETRON 2 MG/ML
4 INJECTION INTRAMUSCULAR; INTRAVENOUS EVERY 6 HOURS PRN
Status: DISCONTINUED | OUTPATIENT
Start: 2021-08-05 | End: 2021-08-06 | Stop reason: HOSPADM

## 2021-08-05 RX ORDER — OXYCODONE HYDROCHLORIDE AND ACETAMINOPHEN 5; 325 MG/1; MG/1
1 TABLET ORAL EVERY 4 HOURS PRN
COMMUNITY
Start: 2021-08-02 | End: 2021-08-11

## 2021-08-05 RX ORDER — CLOPIDOGREL 300 MG/1
300 TABLET, FILM COATED ORAL ONCE
Status: DISCONTINUED | OUTPATIENT
Start: 2021-08-05 | End: 2021-08-06 | Stop reason: HOSPADM

## 2021-08-05 RX ORDER — GABAPENTIN 600 MG/1
600 TABLET ORAL 4 TIMES DAILY
COMMUNITY
Start: 2021-08-02

## 2021-08-05 RX ORDER — ACETAMINOPHEN 325 MG/1
650 TABLET ORAL EVERY 4 HOURS PRN
Status: DISCONTINUED | OUTPATIENT
Start: 2021-08-05 | End: 2021-08-06 | Stop reason: HOSPADM

## 2021-08-05 RX ADMIN — OXYCODONE HYDROCHLORIDE AND ACETAMINOPHEN 1 TABLET: 5; 325 TABLET ORAL at 14:20

## 2021-08-05 ASSESSMENT — PAIN SCALES - GENERAL: PAINLEVEL_OUTOF10: 7

## 2021-08-05 NOTE — H&P
Reason for Referral: PFO     CC: \"I am doing well. \"        Subjective:      History of Present Illness:     Cely Rollins is a 49 y.o. patient with a PMH significant for nicotine addiction and CVA (2014). He was seen by his primary care provider 3/10/2021 after and episode of left hand weakness. An outpatient MRI and carotid doppler we ordered along with a referral to cardiology. He was evaluated in the emergency room on 3/28/29993 with leg pain nad was found to have a DVT and started on Xarelto. Today, he is here to discuss KRYSTAL. He denies having any recurrence of stroke like symptoms. He has been doing well. Patient denies exertional chest pain/pressure, dyspnea at rest, SALAZAR, PND, orthopnea, palpitations, lightheadedness, weight changes, changes in LE edema, and syncope. Patient reports compliance to his medications.      Past Medical History:   has a past medical history of Alcoholism ([address] Allergic migraine with status migrainosus, Allergic rhinitis, seasonal, Anemia, Arthritis, Vaughn's esophagus, Benign intracranial hypertension, Cancer ([address] Carpal tunnel syndrome, Chronic pain, Depression, GERD (gastroesophageal reflux disease), Headache(784.0), History of blood transfusion, History of tobacco use, Migraine, Morbid obesity ([address] Movement disorder, Onychomycosis, Sleep apnea, obstructive, Unspecified cerebral artery occlusion with cerebral infarction, Use of cane as ambulatory aid, Wears dentures, and Wears glasses.     Surgical History:   has a past surgical history that includes Gastric bypass surgery (Jan 2009); Splenectomy; LASIK (2005); knee surgery (July 2012); Carpal tunnel release; laparotomy; Knee arthroscopy (Right, 7/11/2013); Knee arthroscopy (Right, 7/11/12); Total knee arthroplasty (10/15/13); Endoscopy, colon, diagnostic; Dilatation, esophagus; eye surgery; joint replacement;  Abdominal exploration surgery (1/11/16); other surgical history (Left, 03/08/2016); hernia repair (3-); Upper gastrointestinal endoscopy (6-7-2016); other surgical history (2016); Upper gastrointestinal endoscopy (04/02/2018); Kidney removal (Left, 08/01/2018); Abdomen surgery; Abdomen surgery (4-7-2016); Revision total knee arthroplasty (Right, 12/17/2019); Revision total knee arthroplasty (Right, 1/22/2020); knee surgery (Right, 2/18/2020); Total knee arthroplasty (Right, 8/12/2020); Revision total knee arthroplasty (Right, 2/16/2021); knee surgery (Right, 2/26/2021); and knee surgery (Right, 3/5/2021).    Social History:   reports that he quit smoking about 2 months ago. His smoking use included cigarettes. He has a 12.50 pack-year smoking history. He has never used smokeless tobacco. He reports current drug use. Drug: Marijuana. He reports that he does not drink alcohol.      Family History:  family history includes Arthritis in his mother; Cancer in his father; Diabetes in his maternal uncle; Heart Disease in his maternal uncle and maternal uncle.     Home Medications:  Were reviewed and are listed in nursing record and/or below  Home Medications           Prior to Admission medications    Medication Sig Start Date End Date Taking? Authorizing Provider   rivaroxaban (XARELTO) 20 MG TABS tablet Take 1 tablet by mouth daily (with breakfast) 7/7/21   Yes Caroline Sarah MD   atorvastatin (LIPITOR) 40 MG tablet Take 1 tablet by mouth nightly At bedtime 7/7/21   Yes Caroline Sarah MD   FLUoxetine (PROZAC) 20 MG capsule TAKE 1 CAPSULE BY MOUTH DAILY 6/25/21   Yes Caroline Sarah MD   zolpidem (AMBIEN) 10 MG tablet Take 1 tablet by mouth nightly as needed (insomnia) for up to 90 days.  6/7/21 9/5/21 Yes Tiffanie R Kehrt, APRN - CNP   aspirin (ASPIRIN CHILDRENS) 81 MG chewable tablet Take 1 tablet by mouth daily 4/1/21   Yes Caroline Sarah MD   traZODone (DESYREL) 100 MG tablet Take 2 tablets by mouth nightly 3/16/21   Yes Gay Lorenzana MD   sildenafil (REVATIO) 20 MG tablet Take 4 tablets by mouth as needed (30 min prior to intercourse) 1/11/21   Yes Nataliya Laird MD   pantoprazole (PROTONIX) 40 MG tablet Take 1 tablet by mouth 2 times daily 2/28/20   Yes Nataliya Laird MD   calcium-vitamin D (OSCAL 500/200 D-3) 500-200 MG-UNIT per tablet Take 1 tablet by mouth 2 times daily      Yes Historical Provider, MD   Multiple Vitamin TABS Take 1 tablet by mouth daily      Yes Historical Provider, MD            CURRENT Medications:    Current Meds Link used for Sign Out Report   No current facility-administered medications for this visit.         Allergies:  Nsaids, Morphine, Prochlorperazine, Valtrex [valacyclovir hcl], Morphine and related, and Tolmetin               Review of Systems: SEE HPI   · Constitutional: no unanticipated weight loss. There's been no change in energy level, sleep pattern, or activity level. No fevers, chills. · Eyes: No visual changes or diplopia. No scleral icterus. · ENT: No Headaches, hearing loss or vertigo. No mouth sores or sore throat. · Cardiovascular: No Chest pain, tightness or discomfort. · No Shortness of breath. No Dyspnea on exertion, Orthopnea, Paroxysmal nocturnal dyspnea or breathlessness at rest.  · No Palpitations. · No Syncope ('blackouts', 'faints', 'collapse') or dizziness. · Respiratory: No cough or wheezing, no sputum production. No hematemesis. · Gastrointestinal: No abdominal pain, appetite loss, blood in stools. No change in bowel or bladder habits. · Genitourinary: No dysuria, trouble voiding, or hematuria. · Musculoskeletal:  No gait disturbance, no joint complaints. · Integumentary: No rash or pruritis. · Neurological: No headache, diplopia, change in muscle strength, numbness or tingling. · Psychiatric: No anxiety or depression. · Endocrine: No temperature intolerance. No excessive thirst, fluid intake, or urination. No tremor.   · Hematologic/Lymphatic: No abnormal bruising or bleeding, blood clots or swollen lymph nodes.  · Allergic/Immunologic: No nasal congestion or hives.        Objective:      PHYSICAL EXAM:           Vitals:     07/23/21 0923   BP: 122/70   Pulse: 92   SpO2: 98%    Weight: 209 lb 9.6 oz (95.1 kg)       General Appearance:  Alert, cooperative, no distress, appears stated age. Head:  Normocephalic, without obvious abnormality, atraumatic. Eyes:  Pupils equal and round. No scleral icterus. Mouth: Moist mucosa, no pharyngeal erythema. Nose: Nares normal. No drainage or sinus tenderness. Neck: Supple, symmetrical, trachea midline. No adenopathy. No tenderness/mass/nodules. No carotid bruit or elevated JVD. Lungs:   Respiratory Effort: Normal   Auscultation: Clear to auscultation bilaterally, respirations unlabored. No wheeze, rales   Chest Wall:  No tenderness or deformity. Cardiovascular:     Pulses  Palpation: normal   Ascultation: Regular rate, S1/ S2 normal. No murmur, rub, or gallop. 2+ radial and pedal pulses, symmetric  Carotid  Femoral   Abdomen and Gastrointestinal:   Soft, non-tender, bowel sounds active. Liver and Spleen  Masses   Musculoskeletal: No muscle wasting  Back  Gait   Extremities: Extremities normal, atraumatic. No cyanosis or edema. No cyanosis clubbing         Skin: Inspection and palpation performed, no rashes or lesions. Pysch: Normal mood and affect.  Alert and oriented to time place person   Neurologic: Normal gross motor and sensory exam.       Labs      All labs have been reviewed           Lab Results   Component Value Date     WBC 5.3 06/25/2021     RBC 4.26 06/25/2021     RBC 4.94 01/07/2015     HGB 12.0 06/25/2021     HCT 36.5 06/25/2021     MCV 85.7 06/25/2021     RDW 21.9 06/25/2021      06/25/2021            Lab Results   Component Value Date      06/25/2021     K 4.0 06/25/2021     K 4.4 06/22/2021      06/25/2021     CO2 23 06/25/2021     BUN 6 06/25/2021     CREATININE 0.8 06/25/2021     GFRAA >60 06/25/2021     AGRATIO 1.7 06/25/2021   LABGLOM >60 06/25/2021     GLUCOSE 109 06/25/2021     GLUCOSE 84 01/07/2015     PROT 6.4 06/25/2021     PROT 6.7 01/07/2015     CALCIUM 9.3 06/25/2021     BILITOT 0.5 06/25/2021     ALKPHOS 100 06/25/2021     AST 11 06/25/2021     ALT 8 06/25/2021      No results found for: PTINR        Lab Results   Component Value Date     LABA1C 5.1 02/09/2021            Lab Results   Component Value Date     CKTOTAL 56 09/07/2020     CKMB 0.9 06/18/2014     TROPONINI <0.01 03/26/2021         Cardiac, Vascular and Imaging Data all Personally Reviewed in Detail by Myself       EKG:      Echocardiogram:   ECHO 4/27/2021  Ejection fraction is visually estimated to be 55-60%. Normal diastolic function. Moderately dilated right ventricle. Right ventricular systolic function is normal .  A bubble study was performed and shows evidence of right to left shunting  consistent with a patent foramen ovale or atrial septal defect.     Stress Test:      Cath:     Other imaging:   MRI Brain 4/1/2021  1. No acute infarct or acute intracranial process identified. 2. Remote infarct involving the posterior right insular cortex and right   parietal lobe, unchanged.      Carotid doppler 4/1/2021  Duplex sonography with color flow enhancement was performed bilaterally on    the cervical carotid system.    No evidence of hemodynamically significant stenosis in the bilateral carotid    and vertebral arteries.    Elevated velocities in the bilateral common carotid arteries with normal    waveforms being present; likely normal variant      30 day event monitor 3/30/2021  Baseline sinus rhythm      KRYSTAL 6/29/2021  LV Function is normal  Mitral and aortic valve appear normal  A bubble study was performed and shows evidence of right to left shunting  consistent with a patent foramen ovale or atrial septal defect.     Assessment and Plan      CVA/TIA multiple episodes.   Remote infarct involving the posterior right insular cortex and right parietal lobe. 30 day monitor did not show any arrhythmia. No recurrence of symptoms.      PFO  KRYSTAL showed evidence of right to left shunting consistent with PFO. PFO closure scheduled for 8/5/2021. I have explained to him the risks and benefits and he is willing to proceed.      DVT   Continue Xarelto.      Nicotine addiction  Encouraged smoking cessation.      Follow up in 1 month.      Thank you for allowing us to participate in the care of Yaw Riojas.   Please do not hesitate to contact me if you have any questions.     Geoffrey Castillo MD, MPH

## 2021-08-05 NOTE — PROCEDURES
interrogated the device and defect with ICE. Post-deployment, a \"wiggle test\" was done under ICE and fluoroscopic monitoring to confirm device stability. The delivery cable was detached and removed. Further assessment with ICE was done, including a bubble study as necessary. The ICE catheter was then removed. Sheaths were flushed and removed. Short sheaths were exchanged in for the longer/delivery sheaths. Hemostasis was obtained by manual pressure (once ACT dropped to appropriate level). Complications: None     EBL: 20 mL        ICE Findings: Moderate sized PFO    .      Conclusions:   Successful closure of PFO     Recommendations:   -Continue ASA (indefinitely) and plavix (at least 6 months)   -Antiobiotic prophylaxis for 1 year   -Echo     aLura Monterroso MD

## 2021-08-06 RX ORDER — OXYCODONE HYDROCHLORIDE AND ACETAMINOPHEN 5; 325 MG/1; MG/1
1 TABLET ORAL EVERY 8 HOURS PRN
Qty: 21 TABLET | Refills: 0 | Status: SHIPPED | OUTPATIENT
Start: 2021-08-06 | End: 2021-08-13 | Stop reason: SDUPTHER

## 2021-08-13 DIAGNOSIS — S32.010A COMPRESSION FRACTURE OF L1 VERTEBRA, INITIAL ENCOUNTER (HCC): ICD-10-CM

## 2021-08-13 RX ORDER — OXYCODONE HYDROCHLORIDE AND ACETAMINOPHEN 5; 325 MG/1; MG/1
1 TABLET ORAL EVERY 8 HOURS PRN
Qty: 21 TABLET | Refills: 0 | Status: SHIPPED | OUTPATIENT
Start: 2021-08-13 | End: 2021-08-24

## 2021-08-24 ENCOUNTER — OFFICE VISIT (OUTPATIENT)
Dept: FAMILY MEDICINE CLINIC | Age: 51
End: 2021-08-24
Payer: COMMERCIAL

## 2021-08-24 VITALS
TEMPERATURE: 98.6 F | OXYGEN SATURATION: 96 % | RESPIRATION RATE: 14 BRPM | SYSTOLIC BLOOD PRESSURE: 118 MMHG | WEIGHT: 204.8 LBS | HEIGHT: 72 IN | BODY MASS INDEX: 27.74 KG/M2 | DIASTOLIC BLOOD PRESSURE: 66 MMHG | HEART RATE: 86 BPM

## 2021-08-24 DIAGNOSIS — S32.010A CLOSED COMPRESSION FRACTURE OF BODY OF L1 VERTEBRA (HCC): Primary | ICD-10-CM

## 2021-08-24 PROBLEM — S32.009A FRACTURE LUMBAR VERTEBRA-CLOSED (HCC): Status: ACTIVE | Noted: 2021-08-02

## 2021-08-24 PROCEDURE — 99213 OFFICE O/P EST LOW 20 MIN: CPT | Performed by: NURSE PRACTITIONER

## 2021-08-24 RX ORDER — OXYCODONE HYDROCHLORIDE AND ACETAMINOPHEN 5; 325 MG/1; MG/1
1 TABLET ORAL EVERY 6 HOURS PRN
Qty: 56 TABLET | Refills: 0 | Status: SHIPPED | OUTPATIENT
Start: 2021-08-24 | End: 2021-09-07

## 2021-08-24 ASSESSMENT — ENCOUNTER SYMPTOMS
SHORTNESS OF BREATH: 0
BLOOD IN STOOL: 0
DIARRHEA: 0
VOMITING: 0
COUGH: 0
ABDOMINAL PAIN: 0
BACK PAIN: 1
CONSTIPATION: 0
NAUSEA: 0

## 2021-08-24 NOTE — PROGRESS NOTES
8/24/2021    This is a 49 y.o. male   Chief Complaint   Patient presents with    Back Pain     dx with l1 fracture 8/3.  fell saturday and re-injured. sharp pain radiating. Shahid Rai HPI  Patient reports that on 8/2/21 he had a fall at home and went to ER for evaluation. Had CT lumbar spine that showed mild acute wedge compression fx involving the superior endplate of L1. Seen Dr. Jyoti Conklin with [facility] on 8/12/21 and given brace. Plan was to wear for a month. Next appt is     He was taking percocet 5/325 mg every 6 hours. He was trying to wean off and just take tylenol and then he re injured himself. Tripped down some steps on 8/21/21 and exaggerated his pain. Went back to ER for evaluation. Had lumbar xray on 8/21/21 that showed L1 compression fx was stable. Was sent home with percocet 5/325 mg every 6 hours. #20    He is having pain in the middle of his back that radiates down into his hips. Reports that pain is constant. Pain is 10/10 with movement. When he is resting pain is 7-8/10. Denies numbness or muscle weakness. Denies bowel or bladder issues. Also taking gabapentin 600 mg TID for chronic abd pain issues. Tried lidocaine patches without improvement in pain symptoms. Has medical marijuana card and will take his gummy 1-2 times per week when he is having trouble sleeping. He reports that he has been have to stop using the ambien for the past 3 weeks. Patient Active Problem List   Diagnosis    Insomnia-chronic intermittent--uses prn ambien--to be filled by sleep    Vaughn esophagus-on daily ppi-last egd 10/20 Dr Estrella Maloney Allergic rhinitis, seasonal    Obstructive sleep apnea,Severe -(on cpap)    Depression-on daily effexor xr--sees dr Cristian Davidson    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     Chronic pain syndrome    History of stroke: right insular cortex on June 8, 2014 (small PFO; hypergoag w/u neg, [facility] neurology)    Gastroesophageal reflux disease with esophagitis--on daily ppi    Intractable chronic migraine without aura and with status migrainosus    Iron deficiency anemia    B12 deficiency    Malignant neoplasm of left kidney (HCC) clear cell. 8/1/18 Dr Thomas Mullen.  History of depression    History of alcoholism ([facility] [address]    History of total knee arthroplasty, right    Hematoma of right knee region    TIA (transient ischemic attack) LUE weakness 3/21    Tobacco use    PFO (patent foramen ovale)    Acute deep vein thrombosis (DVT) of calf muscle vein of both lower extremities (HCC)    PFO with atrial septal aneurysm          Current Outpatient Medications   Medication Sig Dispense Refill    gabapentin (NEURONTIN) 600 MG tablet Take 600 mg by mouth 3 times daily.  clopidogrel (PLAVIX) 75 MG tablet Take 1 tablet by mouth daily 90 tablet 1    traZODone (DESYREL) 100 MG tablet Take 2 tablets by mouth nightly 180 tablet 1    atorvastatin (LIPITOR) 40 MG tablet Take 1 tablet by mouth nightly At bedtime 90 tablet 1    FLUoxetine (PROZAC) 20 MG capsule TAKE 1 CAPSULE BY MOUTH DAILY 90 capsule 1    zolpidem (AMBIEN) 10 MG tablet Take 1 tablet by mouth nightly as needed (insomnia) for up to 90 days. 90 tablet 1    sildenafil (REVATIO) 20 MG tablet Take 4 tablets by mouth as needed (30 min prior to intercourse) 30 tablet 0    pantoprazole (PROTONIX) 40 MG tablet Take 1 tablet by mouth 2 times daily      calcium-vitamin D (OSCAL 500/200 D-3) 500-200 MG-UNIT per tablet Take 1 tablet by mouth 2 times daily       Multiple Vitamin TABS Take 1 tablet by mouth daily       rivaroxaban (XARELTO) 20 MG TABS tablet Take 1 tablet by mouth daily (with breakfast) 30 tablet 2     No current facility-administered medications for this visit. Allergies   Allergen Reactions    Nsaids Nausea Only and Other (See Comments)     Hx of Barretts esophagus      Morphine Hives and Itching     Pt gets Hives/itching. Does not tolerate     Prochlorperazine Other (See Comments)     No allergic reaction, patient reported sense of \"restlessness\" and fidgiting    Valtrex [Valacyclovir Hcl] Diarrhea    Morphine And Related Hives and Itching    Tolmetin Nausea Only     Hx of Barretts esophagus       Review of Systems   Constitutional: Positive for activity change. Respiratory: Negative for cough and shortness of breath. Cardiovascular: Negative for chest pain. Gastrointestinal: Negative for abdominal pain, blood in stool, constipation, diarrhea, nausea and vomiting. Genitourinary: Negative for difficulty urinating. Musculoskeletal: Positive for arthralgias, back pain and myalgias. Vitals:    08/24/21 1319   BP: 118/66   Site: Right Upper Arm   Position: Sitting   Cuff Size: Large Adult   Pulse: 86   Resp: 14   Temp: 98.6 °F (37 °C)   TempSrc: Oral   SpO2: 96%   Weight: 204 lb 12.8 oz (92.9 kg)   Height: 6' (1.829 m)       Body mass index is 27.78 kg/m². Wt Readings from Last 3 Encounters:   08/24/21 204 lb 12.8 oz (92.9 kg)   08/05/21 215 lb (97.5 kg)   07/23/21 211 lb (95.7 kg)       BP Readings from Last 3 Encounters:   08/24/21 118/66   07/23/21 (!) 422/80   07/23/21 122/70       Physical Exam  Vitals and nursing note reviewed. Constitutional:       General: He is not in acute distress. Appearance: He is well-developed. HENT:      Head: Normocephalic and atraumatic. Eyes:      Extraocular Movements: Extraocular movements intact. Conjunctiva/sclera: Conjunctivae normal.   Cardiovascular:      Rate and Rhythm: Normal rate and regular rhythm. Heart sounds: Normal heart sounds. No murmur heard. No friction rub. No gallop. Pulmonary:      Effort: Pulmonary effort is normal. No respiratory distress. Breath sounds: Normal breath sounds. Musculoskeletal:      Cervical back: Neck supple. Lumbar back: Tenderness and bony tenderness present. Right lower leg: No edema.       Left lower leg: No edema. Comments: Positive for lumbar spine tenderness. Has increased pain at L1, but having tenderness in all of lumbar spine. Randy lower extremity strength is 5/5. Wearing lumbar spine brace. Skin:     General: Skin is warm and dry. Neurological:      Mental Status: He is alert and oriented to person, place, and time. Psychiatric:         Behavior: Behavior normal.         Thought Content: Thought content normal.         Judgment: Judgment normal.         Assessmentand Plan  Soraya Arteaga was seen today for back pain. Diagnoses and all orders for this visit:    Closed compression fracture of body of L1 vertebra (HCC)  -     oxyCODONE-acetaminophen (PERCOCET) 5-325 MG per tablet; Take 1 tablet by mouth every 6 hours as needed for Pain for up to 14 days.  -     External Referral To Pain Clinic    Currently wearing brace. Had evaluation with Dr. Jeni jimenez at [facility] on 8/12. Plan was to f/u in 1 month. Reports that their office does not provide pain medication. Discussed case with his PCP Dr. Marguerite Loyd. Will provide two weeks of pain medication. Advised to take PRN and wean down when able. If he would need more pain medication then he would need to have evaluation with pain specialist. Referral given to Dr. Tammy Benitez. OARRS report reviewed and consistent with prescribing history. Return if symptoms worsen or fail to improve.

## 2021-08-25 ENCOUNTER — PATIENT MESSAGE (OUTPATIENT)
Dept: FAMILY MEDICINE CLINIC | Age: 51
End: 2021-08-25

## 2021-08-25 RX ORDER — DICYCLOMINE HCL 20 MG
20 TABLET ORAL
Qty: 90 TABLET | Refills: 3 | Status: SHIPPED | OUTPATIENT
Start: 2021-08-25 | End: 2021-11-15 | Stop reason: SINTOL

## 2021-08-25 RX ORDER — PANTOPRAZOLE SODIUM 40 MG/1
40 TABLET, DELAYED RELEASE ORAL 2 TIMES DAILY
Qty: 60 TABLET | Refills: 3 | Status: SHIPPED | OUTPATIENT
Start: 2021-08-25 | End: 2021-09-27 | Stop reason: SDUPTHER

## 2021-08-25 NOTE — TELEPHONE ENCOUNTER
From: Daryle Bishop  To: Hermelinda Quiñones MD  Sent: 8/25/2021 7:07 AM EDT  Subject: Prescription Question    Dr. Jenny Kwon, I have two medications, Dicyclomine and Pantoprazole, which have been prescribed by Dr. Rubén Mckeon, a GI with [facility] who started following my initial belly issues post gastric bypass. When trying to refill both of them I've found that he has changed practices and is no longer in-network with my insurance. Is it possible you could take over management of those meds or would you prefer I be referred to a GI within [facility] to manage those? I am currently out of both as it took a couple of weeks of me requesting fills for someone to get around to letting me know he was no longer with [facility].      Thank you,  Nati Walsh

## 2021-08-31 ENCOUNTER — HOSPITAL ENCOUNTER (OUTPATIENT)
Dept: NEUROLOGY | Age: 51
Discharge: HOME | End: 2021-08-31
Payer: COMMERCIAL

## 2021-08-31 DIAGNOSIS — R47.9 SPEECH DISTURBANCE, UNSPECIFIED TYPE: ICD-10-CM

## 2021-08-31 DIAGNOSIS — R41.82 ALTERED MENTAL STATUS, UNSPECIFIED ALTERED MENTAL STATUS TYPE: ICD-10-CM

## 2021-08-31 DIAGNOSIS — Z86.73 PERSONAL HISTORY OF TRANSIENT CEREBRAL ISCHEMIA: ICD-10-CM

## 2021-08-31 PROCEDURE — 95819 EEG AWAKE AND ASLEEP: CPT

## 2021-09-01 NOTE — PROCEDURES
[address] [place] [address]                          ELECTROENCEPHALOGRAM REPORT    PATIENT NAME: Darcie Beltran                    :        1970  MED REC NO:   [id number]                          ROOM:  ACCOUNT NO:   [de-identified]                           ADMIT DATE: 2021  PROVIDER:     Leslie Coleman OF EE2021    REFERRING PROVIDER:  Zuleima Mark MD    REASON FOR STUDY:  Altered mental status. BRIEF HISTORY AND NEUROLOGIC FINDINGS:  The patient is a 60-year-old  male being evaluated for reported altered mental status, speech  disturbances, and apparent loss of consciousness. MEDICATIONS:  The patient's centrally acting medications listed include  trazodone and Prozac. EEG FINDINGS:  This is a 20-channel digital EEG performed utilizing  bipolar and referential montages. Wakefulness, drowsiness and stage II  sleep were obtained during the recording. During maximum wakefulness,  there was a moderate voltage, symmetric, fairly well-regulated and  reactive 8-9 Hz posterior background rhythm. The anterior background  consists of low voltage fast frequencies. Drowsiness is manifested by  attenuation of waking background rhythms. Stage II sleep was manifested  by sleep spindles and K-complexes. Photic stimulation was performed at various flash frequencies and evoked  a symmetric posterior driving response at multiple frequencies. Hyperventilation exercise was not performed due to the patient's  clinical history. A 1-channel EKG rhythm strip was reviewed and showed no obvious cardiac  dysrhythmias. EEG DIAGNOSIS:  Normal awake and asleep EEG. CLINICAL INTERPRETATION:  No definite epileptiform activity or evidence  for focal cerebral dysfunction was present during this recording. If  seizures remain a clinical concern, then a repeat EEG may be of further  benefit. Clinical correlation is advised.         Bushra Gunter DO    D: 08/31/2021 17:26:06       T: 08/31/2021 17:28:25     J [email or web address]  Job#: [id number]     Doc#: [id number]    CC:

## 2021-09-08 ENCOUNTER — TELEPHONE (OUTPATIENT)
Dept: FAMILY MEDICINE CLINIC | Age: 51
End: 2021-09-08

## 2021-09-08 NOTE — TELEPHONE ENCOUNTER
Printed last 2 ov notes and faxed to Dr. Shweta Escobar (faxed to [phone])  Informed him of this.

## 2021-09-08 NOTE — TELEPHONE ENCOUNTER
----- Message from Timothy Dobbins sent at 9/8/2021  4:51 PM EDT -----  Subject: Referral Request    QUESTIONS   Reason for referral request? PT was referred to a pain management   specialist, Dr. Ángel Piña by Dr. Alonzo Matthews. Pt stated that the referral was   faxed over to their office but they need documentation of PT's last two   visits with Dr. Alonzo Matthews in order to have him be seen by them. Pt no   longer has Dr. Delmis Martinez fax number but does have their phone number:   [phone]   Has the physician seen you for this condition before? No   Preferred Specialist (if applicable)? Do you already have an appointment scheduled? No  Additional Information for Provider? Please call back pt when that   documentation is faxed over. Pt is available for call back at any time. ---------------------------------------------------------------------------  --------------  [employer] INFO  What is the best way for the office to contact you? OK to leave message on   voicemail, OK to respond with electronic message via [employer] portal (only   for patients who have registered [employer] account)  Preferred Call Back Phone Number?  [phone]

## 2021-09-12 ENCOUNTER — HOSPITAL ENCOUNTER (EMERGENCY)
Age: 51
Discharge: HOME | End: 2021-09-12
Payer: COMMERCIAL

## 2021-09-12 ENCOUNTER — APPOINTMENT (OUTPATIENT)
Dept: GENERAL RADIOLOGY | Age: 51
End: 2021-09-12
Payer: COMMERCIAL

## 2021-09-12 VITALS
HEART RATE: 81 BPM | TEMPERATURE: 98 F | SYSTOLIC BLOOD PRESSURE: 124 MMHG | OXYGEN SATURATION: 96 % | RESPIRATION RATE: 12 BRPM | DIASTOLIC BLOOD PRESSURE: 77 MMHG | HEIGHT: 72 IN | BODY MASS INDEX: 28.04 KG/M2 | WEIGHT: 207 LBS

## 2021-09-12 DIAGNOSIS — R07.81 RIB PAIN ON LEFT SIDE: Primary | ICD-10-CM

## 2021-09-12 PROCEDURE — 6370000000 HC RX 637 (ALT 250 FOR IP): Performed by: PHYSICIAN ASSISTANT

## 2021-09-12 PROCEDURE — 71101 X-RAY EXAM UNILAT RIBS/CHEST: CPT

## 2021-09-12 PROCEDURE — 99284 EMERGENCY DEPT VISIT MOD MDM: CPT

## 2021-09-12 RX ORDER — LIDOCAINE 4 G/G
PATCH TOPICAL
Qty: 7 PATCH | Refills: 0 | Status: SHIPPED | OUTPATIENT
Start: 2021-09-12 | End: 2021-11-15

## 2021-09-12 RX ORDER — OXYCODONE HYDROCHLORIDE AND ACETAMINOPHEN 5; 325 MG/1; MG/1
1 TABLET ORAL ONCE
Status: COMPLETED | OUTPATIENT
Start: 2021-09-12 | End: 2021-09-12

## 2021-09-12 RX ORDER — LIDOCAINE 4 G/G
1 PATCH TOPICAL DAILY
Status: DISCONTINUED | OUTPATIENT
Start: 2021-09-12 | End: 2021-09-12 | Stop reason: HOSPADM

## 2021-09-12 RX ADMIN — OXYCODONE HYDROCHLORIDE AND ACETAMINOPHEN 1 TABLET: 5; 325 TABLET ORAL at 18:03

## 2021-09-12 ASSESSMENT — PAIN DESCRIPTION - LOCATION
LOCATION: ABDOMEN;RIB CAGE
LOCATION: RIB CAGE;ABDOMEN
LOCATION: ABDOMEN;RIB CAGE

## 2021-09-12 ASSESSMENT — PAIN DESCRIPTION - PAIN TYPE
TYPE: ACUTE PAIN

## 2021-09-12 ASSESSMENT — ENCOUNTER SYMPTOMS
BACK PAIN: 0
CHOKING: 0
NAUSEA: 0
VOMITING: 0
EYE REDNESS: 0
EYE DISCHARGE: 0
FACIAL SWELLING: 0
ABDOMINAL PAIN: 0
APNEA: 0
SHORTNESS OF BREATH: 0

## 2021-09-12 ASSESSMENT — PAIN SCALES - GENERAL
PAINLEVEL_OUTOF10: 9
PAINLEVEL_OUTOF10: 8

## 2021-09-12 ASSESSMENT — PAIN DESCRIPTION - DESCRIPTORS
DESCRIPTORS: SHARP
DESCRIPTORS: SHARP

## 2021-09-12 ASSESSMENT — PAIN DESCRIPTION - ORIENTATION
ORIENTATION: LEFT;OUTER;UPPER
ORIENTATION: LEFT
ORIENTATION: LEFT;OUTER;UPPER

## 2021-09-12 ASSESSMENT — PAIN - FUNCTIONAL ASSESSMENT: PAIN_FUNCTIONAL_ASSESSMENT: 0-10

## 2021-09-12 NOTE — ED PROVIDER NOTES
**ADVANCED PRACTICE PROVIDER, I HAVE EVALUATED THIS PATIENT**        [address]      Pt Name: Jimmy Burch  BDY:[id number]  [place] 1970  Date of evaluation: 9/12/2021  Provider: Shaneka Hutchison PA-C      Chief Complaint:    Chief Complaint   Patient presents with    Rib Pain     left sided pain around ribs and outer upper abd. States was sent home from [facility] 3 days ago for investigation of \"blackouts\" and falling. [facility] told them at that time about the pain but they did not investigate. Hx lumbar vertabrae fx and right rib fracture         Nursing Notes, Past Medical Hx, Past Surgical Hx, Social Hx, Allergies, and Family Hx were all reviewed and agreed with or any disagreements were addressed in the HPI.    HPI: (Location, Duration, Timing, Severity, Quality, Assoc Sx, Context, Modifying factors)    This is a  49 y.o. male who presents to the emergency room with chief complaint of left side rib pain. Patient states he was recently seen and admitted at [facility].  He was released a few days ago he complained of having some left chest wall pain at the time and states there was nothing done. I asked him I did do x-rays and worked him up. He says yes but prior to his discharge he had a left side pain he is on oxycodone for pain. And says is not helping at all. Denies pain with deep breathing. He does get pain with movement. Tommy Ortiz he was in the hospital because he had syncopal episodes out in nowhere and injured his right side at that time. And so far the work-up has been negative. Denies drug use. No nausea vomiting, no fever, no cough. No other complaints.     PastMedical/Surgical History:      Diagnosis Date    Alcoholism [facility])     last drink 2015    Allergic migraine with status migrainosus     Allergic rhinitis, seasonal     Anemia     Arthritis     right knee    Vaughn's esophagus     Benign intracranial hypertension     Cancer ([facility] [address]     renal     Carpal tunnel syndrome     Chronic pain     Depression     GERD (gastroesophageal reflux disease)     Headache(784.0)     History of blood transfusion     History of tobacco use     Quit 7/2014    Migraine     chronic for 1 year    Morbid obesity ([address]     Movement disorder     Onychomycosis     Sleep apnea, obstructive     Severe uses cpap stop bang 6    Unspecified cerebral artery occlusion with cerebral infarction 2014    slight weakness in left arm    Use of cane as ambulatory aid     Wears dentures     full set    Wears glasses          Procedure Laterality Date    ABDOMEN SURGERY      gastric bypass    ABDOMEN SURGERY  4-7-2016    repair of recurrent incisional hernia with mesh, removal of old mesh, bilateral component separation    ABDOMINAL EXPLORATION SURGERY  1/11/16    exp lap, lysis of adhesions, small bowel resection    CARPAL TUNNEL RELEASE      bilat    DILATATION, ESOPHAGUS      ENDOSCOPY, COLON, DIAGNOSTIC      EYE SURGERY      GASTRIC BYPASS SURGERY  Jan 2009    Has lost about 200 pounds.     HERNIA REPAIR  3-    ventral    JOINT REPLACEMENT      KIDNEY REMOVAL Left 08/01/2018    [facility]11[facility]2013    [facility]     KNEE ARTHROSCOPY Right 7/11/12    RIGHT KNEE ARTHROSCOPY WITH CHONDROPLASTY    KNEE SURGERY  July 2012    right, arthroscopy    KNEE SURGERY Right 2/18/2020    INCISION AND DRAINAGE RIGHT KNEE AND WOULD VAC PLACEMENT performed by Chaka Chen MD at [address] Right 2/26/2021    INCISION AND DRAINAGE OF RIGHT KNEE HEMATOMA performed by Russ Carlton MD at . [address] Right 3/5/2021    INCISION AND DRAINAGE AND CLOSURE RIGHT TOTAL KNEE performed by Russ Carlton MD at [address]  2005    OTHER SURGICAL HISTORY Left 03/08/2016    CT biopsy ablasion left kidney    OTHER SURGICAL HISTORY  2016    small intestine 12 inch removed, 2 hernia surgeries, another surgery to drain infection from incision.  REVISION TOTAL KNEE ARTHROPLASTY Right 12/17/2019    RIGHT REVISION TIBIA TOTAL KNEE REPLACEMENT performed by Deysi Avalos MD at [address] [facility] Right 1/22/2020    RIGHT KNEE IRRIGATION AND DEBRIDEMENT WITH POLY EXCHANGE performed by Sue Navarro MD at [facility] [facility] Right 2/16/2021    RIGHT REMOVAL OF EXPLANT AND TOTAL KNEE REPLACEMENT [facility]  10/[facility]    RIGHT    TOTAL KNEE ARTHROPLASTY Right 8/12/2020    RIGHT ARTHROPLASY  RESECTION WITH INSERTION OF SPACER performed by Deysi Avalos MD at [address] [facility]  6-7-2016    UPPER GASTROINTESTINAL ENDOSCOPY  04/02/2018       Medications:  Previous Medications    ATORVASTATIN (LIPITOR) 40 MG TABLET    Take 1 tablet by mouth nightly At bedtime    CALCIUM-VITAMIN D (OSCAL 500/200 D-3) 500-200 MG-UNIT PER TABLET    Take 1 tablet by mouth 2 times daily     CLOPIDOGREL (PLAVIX) 75 MG TABLET    Take 1 tablet by mouth daily    DICYCLOMINE (BENTYL) 20 MG TABLET    Take 1 tablet by mouth 3 times daily (with meals)    FLUOXETINE (PROZAC) 20 MG CAPSULE    TAKE 1 CAPSULE BY MOUTH DAILY    GABAPENTIN (NEURONTIN) 600 MG TABLET    Take 600 mg by mouth 3 times daily. MULTIPLE VITAMIN TABS    Take 1 tablet by mouth daily     PANTOPRAZOLE (PROTONIX) 40 MG TABLET    Take 1 tablet by mouth 2 times daily    RIVAROXABAN (XARELTO) 20 MG TABS TABLET    Take 1 tablet by mouth daily (with breakfast)    SILDENAFIL (REVATIO) 20 MG TABLET    Take 4 tablets by mouth as needed (30 min prior to intercourse)    TRAZODONE (DESYREL) 100 MG TABLET    Take 2 tablets by mouth nightly         Review of Systems:  (2-9 systems needed)  Review of Systems   Constitutional: Negative for chills and fever. HENT: Negative for congestion, facial swelling and sneezing. Eyes: Negative for discharge and redness. Respiratory: Negative for apnea, choking and shortness of breath. Cardiovascular: Positive for chest pain. Gastrointestinal: Negative for abdominal pain, nausea and vomiting. Genitourinary: Negative for dysuria. Musculoskeletal: Negative for back pain, neck pain and neck stiffness. Neurological: Negative for dizziness, tremors, seizures and headaches. All other systems reviewed and are negative. \"Positives and Pertinent negatives as per HPI\"    Physical Exam:  Physical Exam  Vitals and nursing note reviewed. Cardiovascular:      Rate and Rhythm: Normal rate and regular rhythm. Heart sounds: Normal heart sounds. No murmur heard. No friction rub. No gallop. Pulmonary:      Effort: Pulmonary effort is normal. No respiratory distress. Breath sounds: Normal breath sounds. No wheezing or rales. Chest:      Chest wall: Tenderness present. Abdominal:      General: Abdomen is flat. Bowel sounds are normal. There is no distension. Palpations: Abdomen is soft. There is no mass. Tenderness: There is no abdominal tenderness. There is no guarding or rebound. Neurological:      General: No focal deficit present. MEDICAL DECISION MAKING    Vitals:    Vitals:    09/12/21 1630   BP: 124/77   Pulse: 81   Resp: 12   Temp: 98 °F (36.7 °C)   TempSrc: Oral   SpO2: 96%   Weight: 207 lb (93.9 kg)   Height: 6' (1.829 m)       LABS:Labs Reviewed - No data to display     Remainder of labs reviewed and were negative at this time or not returned at the time of this note.     RADIOLOGY:   Non-plain film images such as CT, Ultrasound and MRI are read by the radiologist. Tabitha Gomez PA-C have directly visualized the radiologic plain film image(s) with the below findings:      Interpretation per the Radiologist below, if available at the time of this note:    XR RIBS LEFT INCLUDE CHEST (MIN 3 VIEWS)   Final Result   No acute fracture seen in the ribs. Left basilar opacity could represent atelectasis              XR CHEST (SINGLE VIEW FRONTAL)    Result Date: 9/6/2021  Site: [employer] #: [id number] #: [id number]: GSWREDAccount #: [de-identified] #: [id number] #: [id number]: XR CHEST AP OR PAExam Date/Time: 09/06/2021 07:45 PMAdmitting Diagnosis: Chest painReason for Exam: Chest pain Dictated by: Chito Conteh SCOTT: 09/06/2021 07:59 PMT: This document is confidential medical information. Unauthorized disclosure or use of this information is prohibited by law. If you are not the intended recipient of this document, please advise us by calling immediately [phone]. Impression/Conclusion below HISTORY:   Chest pain fall COMPARISON: Chest x-ray 8/2/2021 NOTE:  If there are questions about the content of this report, please contact [address] [place] [address] radiology by calling [phone] FINDINGS: LINES/DEVICES: None LUNGS/PLEURA:  Mild right basilar airspace disease. No pneumothorax or pleural effusion. HEART:  Septal occlusion device noted. Normal heart size. MEDIASTINUM/MARIA FERNANDA:  Unremarkable BONES:  Minimal scoliotic curvature of the spine. Minimally displaced right lateral seventh and eighth rib fractures. OTHER:  None  IMPRESSION: Nondisplaced right lateral seventh and eighth rib fractures. Right basilar atelectasis or contusion noted. No pneumothorax. SIGNED BY: King Suad MD on 9/6/2021  7:56 PM   [address] [facility] [phone] -  [facility] Call Center: [phone]       XR RIBS RIGHT (2 VIEWS)    Result Date: 9/6/2021  Site: [employer] #: [id number] #: [id number]: Gregg Doherty #: [de-identified] #: [id number] #: [id number]: XR RIBS RIGHT UNILATERALExam Date/Time: 09/06/2021 07:45 PMAdmitting Diagnosis: Chest PainReason for Exam: Chest Pain Dictated by: Chito Conteh SCOTT: 09/06/2021 07:59 PMT: This document is confidential medical information. Unauthorized disclosure or use of this information is prohibited by law. If you are not the intended recipient of this document, please advise us by calling immediately [phone]. Impression/Conclusion below HISTORY:   Chest pain fall COMPARISON: Chest x-ray 8/2/2021 NOTE:  If there are questions about the content of this report, please contact [address] [place] [address] radiology by calling [phone] FINDINGS: LINES/DEVICES: None LUNGS/PLEURA:  Mild right basilar airspace disease. No pneumothorax or pleural effusion. HEART:  Septal occlusion device noted. Normal heart size. MEDIASTINUM/MARIA FERNANDA:  Unremarkable BONES:  Minimal scoliotic curvature of the spine. Minimally displaced right lateral seventh and eighth rib fractures. OTHER:  None  IMPRESSION: Nondisplaced right lateral seventh and eighth rib fractures. Right basilar atelectasis or contusion noted. No pneumothorax. SIGNED BY: Taylor Julian MD on 9/6/2021  7:56 PM   [address] [facility] [phone] -  [facility]: [phone]       XR RIBS LEFT INCLUDE CHEST (MIN 3 VIEWS)    Result Date: 9/12/2021  EXAMINATION: XRAY VIEWS OF THE LEFT RIBS WITH FRONTAL XRAY VIEW OF THE CHEST 9/12/2021 5:25 pm COMPARISON: 03/26/2021 HISTORY: ORDERING SYSTEM PROVIDED HISTORY: Pain TECHNOLOGIST PROVIDED HISTORY: Reason for exam:->Pain Reason for Exam: PT. STATES HE FELL X 1 MONTH AGO AND FRACTURED HIS L1, THEN HE STATES HE FELL X 2 WEEKS AGO AND FRACTURED RT. RIBS, C/O RADIATING LT. LOWER ANTERIOR RIBS X 4-5 DAYS Acuity: Acute Type of Exam: Initial Mechanism of Injury: FREQUENT FALLS Relevant Medical/Surgical History: HX RENAL CA FINDINGS: Chest: Heart size within normal limits. No pneumothorax. No pulmonary vascular congestion or edema. Left basilar opacity. Left ribs: No acute fracture. No acute fracture seen in the ribs.  Left basilar opacity could represent atelectasis     XR LUMBAR SPINE (2-3 VIEWS)    Result Date: 9/6/2021  Site: [facility]uel[facility] Alex Rojas #: [id number] #: [id number]: GSWREDAccount #: [de-identified] #: [id number] #: [id number]: XR LUMBAR SPINE AP AND LATERALExam Date/Time: 09/06/2021 07:45 PMAdmitting Diagnosis: fallReason for Exam: fall Dictated by: Ofe Melton SCOTT: 09/06/2021 08:03 PMT: This document is confidential medical information. Unauthorized disclosure or use of this information is prohibited by law. If you are not the intended recipient of this document, please advise us by calling immediately [phone]. Impression/Conclusion below HISTORY:  fall  fall COMPARISON: 03/21/2021 NOTE:  If there are questions about the content of this report, please contact [address] [place] [address] radiology by calling [phone] FINDINGS: ALIGNMENT:  There is a grade 1 spondylolisthesis at the L4-5 level. Alignment is otherwise maintained. BONES:  There is a moderate anterior compression fracture involving the L1 vertebral body, similar compared to the previous examination. No new lumbar compression deformity. POST-SURGICAL FINDINGS:  None DISC LEVELS:  Unremarkable FACET JOINTS:  There are severe lower lumbar degenerative facet changes. LUMBOSACRAL JUNCTION:  Unremarkable SACRUM AND SI JOINTS:  Unremarkable OTHER:  None IMPRESSION: Unchanged moderate L1 compression fracture. No new lumbar compression deformity. SIGNED BY: Avery Moeller MD on 9/6/2021  8:00 PM   [address] [facility] [phone] -  [facility] Call Center: [phone]       XR LUMBAR SPINE (2-3 VIEWS)    Result Date: 8/21/2021  Site: Nichelle Dumont #: [id number] #: [id number]: Glory Running #: [de-identified] #: [id number] #: [id number]: XR LUMBAR SPINE AP AND LATERALExam Date/Time: 08/21/2021 10:55 PMAdmitting Diagnosis: fallReason for Exam: fall Dictated by: Law Estrada SCOTT: 08/21/2021 11:16 PMT: This document is confidential medical information.   Unauthorized disclosure or use of this PARENCHYMA: No hemorrhage or acute infarction. Localized prominence of the right sylvian  fissure compared to the left appears likely to relate to previous sylvian cortex infarction primarily affecting the frontoparietal region. This is unchanged. VENTRICLES/SULCI: No ventricular compression or midline shift EXTRA-AXIAL SPACES:  No abnormal extra-axial fluid/blood collection. PARANASAL SINUSES/MASTOIDS: Unremarkable BONES:  Unremarkable OTHER: No significant intraorbital abnormality or other significant change IMPRESSION: No hemorrhage, territorial infarction, or other acute abnormality has developed. SIGNED BY: Rachel Harris. Joseph Cm MD on 9/9/2021  2:29 AM   [address] [facility] [phone] - 2011 [facility] Street: [phone]       CT HEAD WO CONTRAST    Result Date: 9/6/2021  Site: Kymberly Cheng #: [id number] #: [id number]: Shivani Daniels #: [de-identified] #: [id number] #: [id number]: CT HEAD WO CONTRASTExam Date/Time: 09/06/2021 07:40 PMAdmitting Diagnosis: Syncope, recurrentReason for Exam:  Syncope, recurrent Dictated by: Ulysses Fredrickson SCOTT: 09/06/2021 07:57 PMT: This document is confidential medical information. Unauthorized disclosure or use of this information is prohibited by law. If you are not the intended recipient of this document, please advise us by calling immediately [phone]. Impression/Conclusion below HISTORY:   Syncope, recurrent COMPARISON:  08/02/2021 TECHNIQUE:  Noncontrast multiplanar CT images of the head NOTE:  If there are questions about the content of this report, please contact [address] [place] [address] radiology by calling [phone] FINDINGS: BRAIN PARENCHYMA: Unremarkable. No intraparenchymal hemorrhage or mass VENTRICLES/SULCI: Unremarkable.   No hydrocephalus or midline shift EXTRA-AXIAL SPACES:  Unremarkable PARANASAL SINUSES/MASTOIDS: Unremarkable BONES:  Unremarkable OTHER: None IMPRESSION: No acute intracranial abnormality SIGNED BY: Swathi Jones MD on 9/6/2021  7:54 PM   121 [facility] [phone] -  2011 [facility] Street: [phone]      MRI [address]    Result Date: 9/9/2021  Site: Delphine Muhammad #: [id number] #: [id number]: Sun Hull #: [de-identified] #: [id number] #: [id number]: MRI LUMBAR SPINE W WO CONExam Date/Time: 09/09/2021 06:50 AMAdmitting Diagnosis: Back trauma, no prior imaging (Age >= 16y)Reason for Exam: Back trauma, no prior imaging (Age >= 16y) Dictated by: Gab SAMUELS SCOTT: 09/09/2021 10:00 AMT: This document is confidential medical information. Unauthorized disclosure or use of this information is prohibited by law. If you are not the intended recipient of this document, please advise us by calling immediately [phone]. Impression/Conclusion below 19 HISTORY:  Back trauma, no prior imaging (Age >= 16y) acute on chronic low back pain, hx L1 fracture  back pain; L1 compression fx; falls; hx renal CA COMPARISON: CT scan lumbar spine 8/2/2021 TECHNIQUE:   Multiplanar multisequence MRI images of the lumbar spine NOTE:  If there are questions about the content of this report, please contact [address] [place] [address] radiology by calling [phone] FINDINGS: ALIGNMENT: Mild kyphosis centered at the superior endplate of L1 is slightly greater BONE MARROW: Superior endplate fracture of L1 with marrow edema post gadolinium enhancement of the vertebral body. Fracture plane extends into the pedicles bilaterally and there is 4 mm of retropulsion of the superior endplate. This is a burst type fracture. Compared to the prior CT scan the degree of retropulsion and degree of compression deformity of the superior endplate has increased and there is now up to 75% loss of height in the central portion of the upper endplate.  CONUS:  Unremarkable DISC LEVELS: T12-L1:  L1 superior endplate retropulsion of 4 mm with no significant canal stenosis and no foraminal compromise   L1-2:      Unremarkable  L2-3:      Large central disc protrusion and mild facet arthropathy combine to produce mild canal stenosis. No foraminal compromise. No change from prior exam.    L3-4:      Disc is unremarkable. Mild bilateral facet arthropathy. L4-5:      Mild degenerative change in the disc. Moderate bilateral facet arthropathy. L5-S1:    Unremarkable    LUMBOSACRAL JUNCTION: Unremarkable SACRUM AND SI JOINTS:  Unremarkable OTHER:  None IMPRESSION: Progressive collapse of L1 superior endplate burst fracture. SIGNED BY: Annabel Romero. MD Adry on 9/9/2021  9:57 AM   121 [facility] [phone] -  [facility] Call Center: [phone] / ED COURSE:      PROCEDURES:   Procedures    None    Patient was given:  Medications   oxyCODONE-acetaminophen (PERCOCET) 5-325 MG per tablet 1 tablet (1 tablet Oral Given 9/12/21 1803)     Emergency room course: Patient on exam throat is clear. Cardiovascular regular rhythm, lungs are clear. No wheeze rales or rhonchi. Chest wall show no bruising. No step-off or crepitus noted mild tenderness along the lateral left lower ribs. No step-off or crepitus noted. Pain with lateral bending pain with rotation. Pain with flexion extension. Abdomen is soft nontender. Normal bowel sounds all 4 quadrant. Full range of motion all extremity. Alert oriented x4. Does not appear to be in acute distress. X-ray shows no acute fracture. See above. Discussed patient x-ray results with him. He will continue his home medication follow-up primary care physician. And return for any worsening. Apply ICE to area. I will give him a prescription for lidocaine patch to apply. The patient tolerated their visit well. I evaluated the patient. The physician was available for consultation as needed.   The patient and / or the family were informed of the results of any tests, a time was given to answer questions, a plan was proposed and they agreed with plan. CLINICAL IMPRESSION:  1. Rib pain on left side        DISPOSITION Decision To Discharge 09/12/2021 06:24:25 PM      PATIENT REFERRED TO:  Jori Maxwell MD  [address] [address]  [phone]    Call in 1 day        DISCHARGE MEDICATIONS:  New Prescriptions    LIDOCAINE 4 % EXTERNAL PATCH    Apply patch to appropriate area and leave on for 12 hours. Remove and leave off for 12 hours before replacing with another 1 as needed for pain.        DISCONTINUED MEDICATIONS:  Discontinued Medications    No medications on file              (Please note the MDM and HPI sections of this note were completed with a voice recognition program.  Efforts were made to edit the dictations but occasionally words are mis-transcribed.)    Electronically signed, Steven Rachel PA-C,          Steven Rachel PA-C  09/12/21 8276

## 2021-09-14 ENCOUNTER — OFFICE VISIT (OUTPATIENT)
Dept: FAMILY MEDICINE CLINIC | Age: 51
End: 2021-09-14
Payer: COMMERCIAL

## 2021-09-14 VITALS
OXYGEN SATURATION: 97 % | BODY MASS INDEX: 27.77 KG/M2 | DIASTOLIC BLOOD PRESSURE: 74 MMHG | WEIGHT: 205 LBS | HEIGHT: 72 IN | SYSTOLIC BLOOD PRESSURE: 118 MMHG | HEART RATE: 69 BPM

## 2021-09-14 DIAGNOSIS — M94.0 COSTOCHONDRITIS: Primary | ICD-10-CM

## 2021-09-14 DIAGNOSIS — R55 SYNCOPE AND COLLAPSE: ICD-10-CM

## 2021-09-14 PROCEDURE — 99214 OFFICE O/P EST MOD 30 MIN: CPT | Performed by: FAMILY MEDICINE

## 2021-09-14 RX ORDER — OXYCODONE HYDROCHLORIDE 5 MG/1
5 TABLET ORAL 4 TIMES DAILY
Qty: 120 TABLET | Refills: 0 | Status: SHIPPED | OUTPATIENT
Start: 2021-09-14 | End: 2021-10-14 | Stop reason: SDUPTHER

## 2021-09-14 NOTE — PROGRESS NOTES
2021    Blood pressure 118/74, pulse 69, height 6' (1.829 m), weight 205 lb (93 kg), SpO2 97 %. Gordon Barrera (:  1970) is a 49 y.o. male, here for evaluation of the following medical concerns:    Chief Complaint   Patient presents with    Other     lt rib pain     Here for f/u on spine/rib pain. Blackout and fell a month ago, with L1 vert fracture. Then blacked out again 2 wks ago and had rib fractures on the right. Admitted for pain control briefly at [facility] ED obs unit. Repeat MRI at that time shows further compression deformity. He is feeling even more intense pain left chest wall where the brace contacts. The pain is constant. Worse with any chest wall motion, pressure. The area of pain is where is back brace contacts his chest wall. He is seeing [facility] neurology for blackouts. EEG was neg initially, by planning a longer one. He still feels unsteady on his feet. Worse when he is standing. Both episodes were while standing or walking.  - he has not seen a cardiologist for this, but sees Dr Diane Langley for PFO closure and plans to see him soon. Gabapentin use has not been associated with this (has been on gabapentin for over a year). He has been off gabapentin for periods of time in the past year, this did not seem to have any effect. But did not have any syncope when he was off gabapentin either. His syncope has no prodrome. Sudden onset- just wakes up on the ground. No numbness/tingling or weakness prior to or after the syncope. Rogelio has reviewed his scans with further compression deformity- advised to continue bracing. No chest pains otherwise, dizziness, heart palpitations, dyspnea, lightheadedness, worsening edema. Gabapentin is for chronic abd pain due to adhesions. Ketamine infusions monthly for this also. Syncope NOT noted after infusions. Also uses prescribed cannabis for abd pain and nausea and insomnia.     Currently using oxycodone Itching     Pt gets Hives/itching. Does not tolerate     Prochlorperazine Other (See Comments)     No allergic reaction, patient reported sense of \"restlessness\" and fidgiting    Valtrex [Valacyclovir Hcl] Diarrhea    Morphine And Related Hives and Itching    Tolmetin Nausea Only     Hx of Barretts esophagus       Prior to Visit Medications    Medication Sig Taking? Authorizing Provider   lidocaine 4 % external patch Apply patch to appropriate area and leave on for 12 hours. Remove and leave off for 12 hours before replacing with another 1 as needed for pain. Yes Lizzie Estrella PA-C   pantoprazole (PROTONIX) 40 MG tablet Take 1 tablet by mouth 2 times daily Yes Paula Marcano MD   dicyclomine (BENTYL) 20 MG tablet Take 1 tablet by mouth 3 times daily (with meals) Yes Paula Marcano MD   gabapentin (NEURONTIN) 600 MG tablet Take 600 mg by mouth 3 times daily.  Yes Historical Provider, MD   clopidogrel (PLAVIX) 75 MG tablet Take 1 tablet by mouth daily Yes De Shay MD   traZODone (DESYREL) 100 MG tablet Take 2 tablets by mouth nightly Yes Lashawn Rodriguez MD   atorvastatin (LIPITOR) 40 MG tablet Take 1 tablet by mouth nightly At bedtime Yes Paula Marcano MD   FLUoxetine (PROZAC) 20 MG capsule TAKE 1 CAPSULE BY MOUTH DAILY Yes Paula Marcano MD   sildenafil (REVATIO) 20 MG tablet Take 4 tablets by mouth as needed (30 min prior to intercourse) Yes Paula Marcano MD   calcium-vitamin D (OSCAL 500/200 D-3) 500-200 MG-UNIT per tablet Take 1 tablet by mouth 2 times daily  Yes Historical Provider, MD   Multiple Vitamin TABS Take 1 tablet by mouth daily  Yes Historical Provider, MD   rivaroxaban (XARELTO) 20 MG TABS tablet Take 1 tablet by mouth daily (with breakfast)  Paula Marcano MD        Social History     Tobacco Use    Smoking status: Former Smoker     Packs/day: 0.50     Years: 25.00     Pack years: 12.50     Types: Cigarettes     Quit date: 5/1/2021     Years reasons, postoperative, acute fractures, break through abd pain). Has remote hx alcoholism. We discussed that he may already have some degree of opioid dependence. He accepts this, but offers that he has no withdrawal between individual presriptions for oxycodone. Does not feel he has dependence at this time. Currently sees a pain specialist in [place], but only for the ketamine infusions. I agreed to treat with oxycodone for short term for his rib pain, but he would not be allowed to accept opioids in the ED or from other doctors. Duration of use: while he has to wear the back brace plus 1 month to wean off. If pain ceases prior to this, he will decrease and stop use. Opioid consent and contracts signed. UDS not ordered (consistent with precribing pattern when performed in ED twice in June- pos for amphetamines (after ketamine infusion a few weeks prior) and oxycodone after prescribed from prior ED visit a few days prior.)    2. Syncope and collapse  - sudden syncope without prodrome sounds lake cardiac arrhythmia. He has an appt with cardiology on Friday (Dr Grace Gao) and will talk to him then      Return in about 8 weeks (around 11/9/2021) for f/u chest pain. An  Checkd.Inignature was used to authenticate this note.     --Idania Carias MD on 9/14/2021 at 12:17 PM

## 2021-09-15 ENCOUNTER — TELEPHONE (OUTPATIENT)
Dept: FAMILY MEDICINE CLINIC | Age: 51
End: 2021-09-15

## 2021-09-16 RX ORDER — CLOPIDOGREL BISULFATE 75 MG/1
75 TABLET ORAL DAILY
Qty: 90 TABLET | Refills: 1 | Status: SHIPPED | OUTPATIENT
Start: 2021-09-16 | End: 2022-04-13

## 2021-09-16 NOTE — TELEPHONE ENCOUNTER
Submitted PA for oxyCODONE HCl 5MG tablets Key:  [id number] - PA Case ID: [id number] Via CM Status PENDING

## 2021-09-16 NOTE — TELEPHONE ENCOUNTER
Pt calling stating that he will be out of the medication by the end of the day today and would like to know how long the PA will take.      Pt can be reached at [phone] anytime

## 2021-09-17 ENCOUNTER — OFFICE VISIT (OUTPATIENT)
Dept: CARDIOLOGY CLINIC | Age: 51
End: 2021-09-17
Payer: COMMERCIAL

## 2021-09-17 ENCOUNTER — TELEPHONE (OUTPATIENT)
Dept: CARDIOLOGY CLINIC | Age: 51
End: 2021-09-17

## 2021-09-17 VITALS
BODY MASS INDEX: 27.36 KG/M2 | SYSTOLIC BLOOD PRESSURE: 122 MMHG | DIASTOLIC BLOOD PRESSURE: 82 MMHG | HEIGHT: 72 IN | HEART RATE: 76 BPM | WEIGHT: 202 LBS | OXYGEN SATURATION: 98 %

## 2021-09-17 DIAGNOSIS — Z86.718 HISTORY OF DVT (DEEP VEIN THROMBOSIS): ICD-10-CM

## 2021-09-17 DIAGNOSIS — G45.9 TIA (TRANSIENT ISCHEMIC ATTACK): ICD-10-CM

## 2021-09-17 DIAGNOSIS — R55 SYNCOPE, UNSPECIFIED SYNCOPE TYPE: ICD-10-CM

## 2021-09-17 DIAGNOSIS — Q21.12 PFO (PATENT FORAMEN OVALE): ICD-10-CM

## 2021-09-17 DIAGNOSIS — Z86.73 HISTORY OF CVA (CEREBROVASCULAR ACCIDENT): Primary | ICD-10-CM

## 2021-09-17 DIAGNOSIS — R55 SYNCOPE, UNSPECIFIED SYNCOPE TYPE: Primary | ICD-10-CM

## 2021-09-17 PROCEDURE — 99214 OFFICE O/P EST MOD 30 MIN: CPT | Performed by: INTERNAL MEDICINE

## 2021-09-17 NOTE — TELEPHONE ENCOUNTER
Patient seen int the office 9/17/2021 and reports multiple syncopal episodes. Dr Lety Petty would like him to be scheduled for a ILR. He wore a 30 day event monitor in May 2021. Would you be able to schedule ILR with Dr Charles Abernathy?

## 2021-09-17 NOTE — PATIENT INSTRUCTIONS
Patient Education        Fainting: Care Instructions  Your Care Instructions     When you faint, or pass out, you lose consciousness for a short time. A brief drop in blood flow to the brain often causes it. When you fall or lie down, more blood flows to your brain and you regain consciousness. Emotional stress, pain, or overheatingespecially if you have been standingcan make you faint. In these cases, fainting is usually not serious. But fainting can be a sign of a more serious problem. Your doctor may want you to have more tests to rule out other causes. The treatment you need depends on the reason why you fainted. The doctor has checked you carefully, but problems can develop later. If you notice any problems or new symptoms, get medical treatment right away. Follow-up care is a key part of your treatment and safety. Be sure to make and go to all appointments, and call your doctor if you are having problems. It's also a good idea to know your test results and keep a list of the medicines you take. How can you care for yourself at home? · Drink plenty of fluids to prevent dehydration. If you have kidney, heart, or liver disease and have to limit fluids, talk with your doctor before you increase your fluid intake. When should you call for help? Call 911 anytime you think you may need emergency care. For example, call if:    · You have symptoms of a heart problem. These may include:  ? Chest pain or pressure. ? Severe trouble breathing. ? A fast or irregular heartbeat. ? Lightheadedness or sudden weakness. ? Coughing up pink, foamy mucus. ? Passing out. After you call 911, the  may tell you to chew 1 adult-strength or 2 to 4 low-dose aspirin. Wait for an ambulance. Do not try to drive yourself.     · You have symptoms of a stroke. These may include:  ? Sudden numbness, tingling, weakness, or loss of movement in your face, arm, or leg, especially on only one side of your body.   ? Sudden vision changes. ? Sudden trouble speaking. ? Sudden confusion or trouble understanding simple statements. ? Sudden problems with walking or balance. ? A sudden, severe headache that is different from past headaches.     · You passed out (lost consciousness) again. Watch closely for changes in your health, and be sure to contact your doctor if:    · You do not get better as expected. Where can you learn more? Go to [email or web address] and sign in to your [employer] account. Enter Z608 in the [employer] box to learn more about \"Fainting: Care Instructions. \"     If you do not have an account, please click on the \"Sign Up Now\" link. Current as of: October 19, 2020               Content Version: 12.9  © 2006-2021 Healthwise, Incorporated. Care instructions adapted under license by [facility] [place] ([facility]). If you have questions about a medical condition or this instruction, always ask your healthcare professional. [place] [address] any warranty or liability for your use of this information.

## 2021-09-17 NOTE — PROGRESS NOTES
[facility]  Cardiology Consult    Yaw Riojas  1970 September 17, 2021      Reason for Referral: PFO    CC: \"I have been blacking out. \"      Subjective:     History of Present Illness:    Yaw Riojas is a 49 y.o. patient with a PMH significant for nicotine addiction and CVA (2014). He was seen by his primary care provider 3/10/2021 after and episode of left hand weakness. An outpatient MRI and carotid doppler we ordered along with a referral to cardiology. He was evaluated in the emergency room on 3/28/01785 with leg pain nad was found to have a DVT and started on Xarelto. Today, he is her for follow up s/p PFO closure. He has had multiple recent syncopal episodes. He is unsure why this has been happening. He has no warning that this is going to happen and he just wakes up on the floor. Patient denies exertional chest pain/pressure, dyspnea at rest, SALAZAR, PND, orthopnea, palpitations, lightheadedness, weight changes, changes in LE edema, and syncope. Past Medical History:   has a past medical history of Alcoholism ([address] Allergic migraine with status migrainosus, Allergic rhinitis, seasonal, Anemia, Arthritis, Vaughn's esophagus, Benign intracranial hypertension, Cancer ([address] Carpal tunnel syndrome, Chronic pain, Depression, GERD (gastroesophageal reflux disease), Headache(784.0), History of blood transfusion, History of tobacco use, Migraine, Morbid obesity ([address] Movement disorder, Onychomycosis, Sleep apnea, obstructive, Unspecified cerebral artery occlusion with cerebral infarction, Use of cane as ambulatory aid, Wears dentures, and Wears glasses. Surgical History:   has a past surgical history that includes Gastric bypass surgery (Jan 2009); Splenectomy; LASIK (2005); knee surgery (July 2012); Carpal tunnel release; laparotomy; Knee arthroscopy (Right, 7/11/2013); Knee arthroscopy (Right, 7/11/12); Total knee arthroplasty (10/15/13);  Endoscopy, colon, diagnostic; Dilatation, esophagus; eye surgery; joint replacement; Abdominal exploration surgery (1/11/16); other surgical history (Left, 03/08/2016); hernia repair (3-); Upper gastrointestinal endoscopy (6-7-2016); other surgical history (2016); Upper gastrointestinal endoscopy (04/02/2018); Kidney removal (Left, 08/01/2018); Abdomen surgery; Abdomen surgery (4-7-2016); Revision total knee arthroplasty (Right, 12/17/2019); Revision total knee arthroplasty (Right, 1/22/2020); knee surgery (Right, 2/18/2020); Total knee arthroplasty (Right, 8/12/2020); Revision total knee arthroplasty (Right, 2/16/2021); knee surgery (Right, 2/26/2021); and knee surgery (Right, 3/5/2021). Social History:   reports that he quit smoking about 4 months ago. His smoking use included cigarettes. He has a 12.50 pack-year smoking history. He has never used smokeless tobacco. He reports current drug use. Drug: Marijuana. He reports that he does not drink alcohol. Family History:  family history includes Arthritis in his mother; Cancer in his father; Diabetes in his maternal uncle; Heart Disease in his maternal uncle and maternal uncle. Home Medications:  Were reviewed and are listed in nursing record and/or below  Prior to Admission medications    Medication Sig Start Date End Date Taking? Authorizing Provider   clopidogrel (PLAVIX) 75 MG tablet Take 1 tablet by mouth daily 9/16/21  Yes Oseas Jean-Baptiste MD   oxyCODONE (ROXICODONE) 5 MG immediate release tablet Take 1 tablet by mouth 4 times daily for 30 days. 9/14/21 10/14/21 Yes Yessy Ann MD   lidocaine 4 % external patch Apply patch to appropriate area and leave on for 12 hours. Remove and leave off for 12 hours before replacing with another 1 as needed for pain.  9/12/21  Yes Amy Ortega PA-C   pantoprazole (PROTONIX) 40 MG tablet Take 1 tablet by mouth 2 times daily 8/25/21  Yes Yessy Ann MD   dicyclomine (BENTYL) 20 MG tablet Take 1 tablet by mouth 3 times daily (with meals) 8/25/21  Yes Caroline Sarah MD   gabapentin (NEURONTIN) 600 MG tablet Take 600 mg by mouth 3 times daily. 8/2/21  Yes Historical Provider, MD   traZODone (DESYREL) 100 MG tablet Take 2 tablets by mouth nightly 7/23/21  Yes Gay Lorenzana MD   atorvastatin (LIPITOR) 40 MG tablet Take 1 tablet by mouth nightly At bedtime 7/7/21  Yes Caroline Sarah MD   FLUoxetine (PROZAC) 20 MG capsule TAKE 1 CAPSULE BY MOUTH DAILY 6/25/21  Yes Caroline Sarah MD   sildenafil (REVATIO) 20 MG tablet Take 4 tablets by mouth as needed (30 min prior to intercourse) 1/11/21  Yes Caroline Sarah MD   calcium-vitamin D (OSCAL 500/200 D-3) 500-200 MG-UNIT per tablet Take 1 tablet by mouth 2 times daily    Yes Historical Provider, MD   Multiple Vitamin TABS Take 1 tablet by mouth daily    Yes Historical Provider, MD        CURRENT Medications:  No current facility-administered medications for this visit. Allergies:  Nsaids, Morphine, Prochlorperazine, Valtrex [valacyclovir hcl], Morphine and related, and Tolmetin           Review of Systems: SEE HPI   · Constitutional: no unanticipated weight loss. There's been no change in energy level, sleep pattern, or activity level. No fevers, chills. · Eyes: No visual changes or diplopia. No scleral icterus. · ENT: No Headaches, hearing loss or vertigo. No mouth sores or sore throat. · Cardiovascular: No Chest pain, tightness or discomfort.  No Shortness of breath. No Dyspnea on exertion, Orthopnea, Paroxysmal nocturnal dyspnea or breathlessness at rest.   No Palpitations.  No Syncope ('blackouts', 'faints', 'collapse') or dizziness. · Respiratory: No cough or wheezing, no sputum production. No hematemesis. · Gastrointestinal: No abdominal pain, appetite loss, blood in stools. No change in bowel or bladder habits. · Genitourinary: No dysuria, trouble voiding, or hematuria.   · Musculoskeletal:  No gait disturbance, no joint complaints. · Integumentary: No rash or pruritis. · Neurological: No headache, diplopia, change in muscle strength, numbness or tingling. · Psychiatric: No anxiety or depression. · Endocrine: No temperature intolerance. No excessive thirst, fluid intake, or urination. No tremor. · Hematologic/Lymphatic: No abnormal bruising or bleeding, blood clots or swollen lymph nodes. · Allergic/Immunologic: No nasal congestion or hives. Objective:     PHYSICAL EXAM:      Vitals:    09/17/21 1110   BP: 122/82   Pulse: 76   SpO2: 98%    Weight: 202 lb (91.6 kg)       General Appearance:  Alert, cooperative, no distress, appears stated age. Head:  Normocephalic, without obvious abnormality, atraumatic. Eyes:  Pupils equal and round. No scleral icterus. Mouth: Moist mucosa, no pharyngeal erythema. Nose: Nares normal. No drainage or sinus tenderness. Neck: Supple, symmetrical, trachea midline. No adenopathy. No tenderness/mass/nodules. No carotid bruit or elevated JVD. Lungs:   Respiratory Effort: Normal   Auscultation: Clear to auscultation bilaterally, respirations unlabored. No wheeze, rales   Chest Wall:  No tenderness or deformity. Cardiovascular:    Pulses  Palpation: normal   Ascultation: Regular rate, S1/ S2 normal. No murmur, rub, or gallop. 2+ radial and pedal pulses, symmetric  Carotid  Femoral   Abdomen and Gastrointestinal:   Soft, non-tender, bowel sounds active. Liver and Spleen  Masses   Musculoskeletal: No muscle wasting  Back  Gait   Extremities: Extremities normal, atraumatic. No cyanosis or edema. No cyanosis clubbing       Skin: Inspection and palpation performed, no rashes or lesions. Pysch: Normal mood and affect.  Alert and oriented to time place person   Neurologic: Normal gross motor and sensory exam.       Labs     All labs have been reviewed    Lab Results   Component Value Date    WBC 4.9 07/30/2021    RBC 4.55 07/30/2021    RBC 4.94 01/07/2015    HGB 13.5 07/30/2021    HCT 40.3 07/30/2021    MCV 88.5 07/30/2021    RDW 20.2 07/30/2021     07/30/2021     Lab Results   Component Value Date     07/30/2021    K 4.6 07/30/2021    K 4.4 06/22/2021     07/30/2021    CO2 28 07/30/2021    BUN 7 07/30/2021    CREATININE 1.0 07/30/2021    GFRAA >60 07/30/2021    AGRATIO 1.7 06/25/2021    LABGLOM >60 07/30/2021    GLUCOSE 86 07/30/2021    GLUCOSE 84 01/07/2015    PROT 6.4 06/25/2021    PROT 6.7 01/07/2015    CALCIUM 9.3 07/30/2021    BILITOT 0.5 06/25/2021    ALKPHOS 100 06/25/2021    AST 11 06/25/2021    ALT 8 06/25/2021     No results found for: USEREADY [facility]  Lab Results   Component Value Date    LABA1C 5.1 02/09/2021     Lab Results   Component Value Date    CKTOTAL 56 09/07/2020    CKMB 0.9 06/18/2014    TROPONINI <0.01 03/26/2021       Cardiac, Vascular and Imaging Data all Personally Reviewed in Detail by Myself      EKG:     Echocardiogram:   ECHO 4/27/2021  Ejection fraction is visually estimated to be 55-60%. Normal diastolic function. Moderately dilated right ventricle. Right ventricular systolic function is normal .  A bubble study was performed and shows evidence of right to left shunting  consistent with a patent foramen ovale or atrial septal defect. ECHO 8/5/2021  Normal left ventricle size, wall thickness, and systolic function with an  estimated ejection fraction of 55%. No regional wall motion abnormalities  are seen. A bubble study was performed and fails to show evidence of right to left  shunting. A 25 mm Amplatzer PFO occluder device was seen and appears well seated  between the right and left atria. No evidence of any pericardial effusion. Stress Test:     Cath: Other imaging:   MRI Brain 4/1/2021  1. No acute infarct or acute intracranial process identified. 2. Remote infarct involving the posterior right insular cortex and right   parietal lobe, unchanged.      Carotid doppler 4/1/2021  Duplex sonography with color flow enhancement was performed bilaterally on    the cervical carotid system.    No evidence of hemodynamically significant stenosis in the bilateral carotid    and vertebral arteries.    Elevated velocities in the bilateral common carotid arteries with normal    waveforms being present; likely normal variant     30 day event monitor 3/30/2021  Baseline sinus rhythm     KRYSTAL 6/29/2021  LV Function is normal  Mitral and aortic valve appear normal  A bubble study was performed and shows evidence of right to left shunting  consistent with a patent foramen ovale or atrial septal defect. Assessment and Plan     Syncope  He should consider ILR. He has had a 30 day event monitor 5/2021. I will have the ILR scheduled with EP. CVA/TIA multiple episodes. Remote infarct involving the posterior right insular cortex and right parietal lobe. 30 day monitor did not show any arrhythmia. No recurrence of symptoms. PFO  S/p PFO closure 8/5/2021. Continue Plavix and statin. Will need prophylactic antibiotics for at least 1 year. Repeat echocardiogram in 6 months and 1 year. DVT   Continue Xarelto. Nicotine addiction  Encouraged smoking cessation. Follow up in 6 months. Thank you for allowing us to participate in the care of Van Aguilar. Please do not hesitate to contact me if you have any questions. Crystal Potter MD, MPH    [facility] [place], operated [facility], [address] [place] [address]  Ph: [phone]  Fax: [phone]      This note was scribed in the presence of Dr Radha Wren, by Stella Long RN. Physician Attestation:  The scribes documentation has been prepared under my direction and personally reviewed by me in its entirety. I confirm that the note above accurately reflects all work, treatment, procedures, and medical decision making performed by me.

## 2021-09-20 NOTE — TELEPHONE ENCOUNTER
LVM for patient to call back for scheduling. Patient has Lilia Jordan and will need to be scheduled at least 3 weeks out.

## 2021-09-20 NOTE — TELEPHONE ENCOUNTER
Pt called back in this morning and is ready to get his ICD implant scheduled.  He stated he did not need to make an appt to discuss with Dr. Jenelle Elmore first.    He can be reached back at [phone] chronic weakness 2/2 underlying medical conditions, no acute change

## 2021-09-20 NOTE — TELEPHONE ENCOUNTER
Patient returned call and scheduled for 10/8/21 at 1030 AM with an arrival of [address]   The morning of your Procedure-Loop Implant you will park in the hospital parking lot and report directly to the cath lab to check in.    Pre-Procedure Instructions:  1. Do not eat or drink anything 3-4 hours before your procedure. 2. All other medications can be taken in the morning with sips of water. 3. Do not hold anticoagulation therapy: warfarin, eliquis, xarelto therapy. 4. Do not use any lotions, creams or perfume the morning of procedure. 5. Please arrive 1 hour prior to procedure time. 6. Please have a responsible adult to drive you home after procedure. It is recommended you do not drive for 24 hours after procedure. 7. Cath lab will provide you with all post procedure instructions. If you have any questions regarding the procedure itself or medications please call [phone] and ask to speak with a nurse. Procedure published on [employer] and procedure form emailed.

## 2021-09-23 NOTE — TELEPHONE ENCOUNTER
Please notify Krys Adonis that he is free to fill the prescription. The PA is only about the insurance paying for it. If he wishes to substitute oxycodone/acetaminophen (percocet), I can try that.

## 2021-09-24 ENCOUNTER — VIRTUAL VISIT (OUTPATIENT)
Dept: PULMONOLOGY | Age: 51
End: 2021-09-24
Payer: COMMERCIAL

## 2021-09-24 DIAGNOSIS — J30.2 SEASONAL ALLERGIC RHINITIS, UNSPECIFIED TRIGGER: Chronic | ICD-10-CM

## 2021-09-24 DIAGNOSIS — G47.33 OBSTRUCTIVE SLEEP APNEA: Primary | Chronic | ICD-10-CM

## 2021-09-24 DIAGNOSIS — K21.00 GASTROESOPHAGEAL REFLUX DISEASE WITH ESOPHAGITIS WITHOUT HEMORRHAGE: ICD-10-CM

## 2021-09-24 DIAGNOSIS — F10.982 ALCOHOL-INDUCED INSOMNIA (HCC): Chronic | ICD-10-CM

## 2021-09-24 PROCEDURE — 99214 OFFICE O/P EST MOD 30 MIN: CPT | Performed by: NURSE PRACTITIONER

## 2021-09-24 RX ORDER — ZOLPIDEM TARTRATE 10 MG/1
10 TABLET ORAL NIGHTLY PRN
Qty: 90 TABLET | Refills: 1 | Status: SHIPPED | OUTPATIENT
Start: 2021-09-24 | End: 2021-10-24

## 2021-09-24 ASSESSMENT — SLEEP AND FATIGUE QUESTIONNAIRES
HOW LIKELY ARE YOU TO NOD OFF OR FALL ASLEEP WHILE SITTING QUIETLY AFTER LUNCH WITHOUT ALCOHOL: 0
ESS TOTAL SCORE: 2
HOW LIKELY ARE YOU TO NOD OFF OR FALL ASLEEP WHEN YOU ARE A PASSENGER IN A CAR FOR AN HOUR WITHOUT A BREAK: 0
HOW LIKELY ARE YOU TO NOD OFF OR FALL ASLEEP WHILE SITTING AND READING: 0
HOW LIKELY ARE YOU TO NOD OFF OR FALL ASLEEP WHILE LYING DOWN TO REST IN THE AFTERNOON WHEN CIRCUMSTANCES PERMIT: 2
HOW LIKELY ARE YOU TO NOD OFF OR FALL ASLEEP IN A CAR, WHILE STOPPED FOR A FEW MINUTES IN TRAFFIC: 0
HOW LIKELY ARE YOU TO NOD OFF OR FALL ASLEEP WHILE WATCHING TV: 0
HOW LIKELY ARE YOU TO NOD OFF OR FALL ASLEEP WHILE SITTING AND TALKING TO SOMEONE: 0
HOW LIKELY ARE YOU TO NOD OFF OR FALL ASLEEP WHILE SITTING INACTIVE IN A PUBLIC PLACE: 0

## 2021-09-24 NOTE — ASSESSMENT & PLAN NOTE
Current chronicity: stable    Current Medication: Ambien    Prescribed by: our office     Agree on current plan: Continue current plan per provider

## 2021-09-24 NOTE — PROGRESS NOTES
Apurva Alan MD, FAASM, [facility]  Last Bustos, MSN, RN, [id number] [place] [address] [address]  Dept: [phone]  Dept Fax: [phone]  Loc: [phone]    Subjective:     Patient ID: Nikki Elizabeth is a 49 y.o. male. Chief Complaint   Patient presents with    Sleep Apnea       HPI:      Sleep Medicine Video Visit    Pursuant to the emergency declaration under the [address] [place] [address] [facility] authority and the [employer] Act this Telephone Visit was insisted, with patient's consent, to reduce the patient's risk of exposure to COVID-19 and provide continuity of care for an established patient. Services were provided through a synchronous discussion over a telephone and/or Video chat to substitute for in-person clinic visit, and coded as such. While patient is at home. Machine Modem/Download Info:                                        PAP Mask  Mask Type: Full Face mask     [place] - Total score: 2    Follow-up :     Last Visit : March 2021      Subjective Health Changes: None      Over Night Oximetry: [] Yes  [] No  [x] NA [] WNL   Using O2: [] Yes  [] No  [x] NA   Patient is compliant with the machine  [] Yes  [] No [x] Per patient   Feeling rested when using the machine   [x] Yes  [] No     Pressure is comfortable with inspiration and expiration  [x] Yes  [] No   ([x] NA   [] Feeling of suffocation  [] Feeling like not enough air    [] To much pressure)     Noticed changes in pressure  [x] NA  [] Yes    [] No     Mask is fitting well  [x] Yes  [] No   Noting Mask Air Leak  [] Yes  [x] No   Having painful Aerophagia  [] Yes  [x] No   Nocturia   0-1  per night.    Having  HA upon waking  [] Yes  [x] No   Dry mouth upon waking   Dry Nose  Dry Eyes  [x] Yes  [] No   Congestion upon waking   [] Yes  [x] No    Nose Bleeds  [] Yes  [x] No Using Sleep Aides  Ambien  [] NA  [] OTC  [x] Per our office   [] Per another provider   Understands how to change humidification and/or tubing temperature for comfort while at home  [x] Yes  [] No     Difficulties falling asleep  [] Yes  [x] No   Difficulties staying asleep  [] Yes  [x] No   Approximate time to bed  10pm   Approximate wake time  6-7am   Taking Naps  occasional   If taking naps usual length  120 minutes    [] NA   If taking naps using the machine [] NA  [] Yes    [x] No    [] With and With out    Drowsy when driving  [] Yes  [x] No     Does patient carry a DOT/CDL  [] Yes  [x] No     Does patient carry FAA/[employer] License   [] Yes  [x] No      Any concerns noted with the machine at this time  [] Yes  [x] No       Assessment/Plan:   1. Obstructive sleep apnea,Severe -(on cpap)  Assessment & Plan:  Unable to Review compliance download with pt. Supplies and parts as needed for his machine. These are medically necessary. Continue medications per his PCP and other physicians. Limit caffeine use after 3pm.    The chronic medical conditions listed are directly related to the primary diagnosis listed above. The management of the primary diagnosis affects the secondary diagnosis and vice versa    Per patient patient IS compliant. At this time I am unable to assess this, and this could increase the risk of heart attacks, strokes, car accidents and/or death if not using the machine nightly, and with each sleep. Orders:  -     zolpidem (AMBIEN) 10 MG tablet; Take 1 tablet by mouth nightly as needed for Sleep for up to 30 days. , Disp-90 tablet, R-1Normal  2. Alcohol-induced insomnia (HCC)  Assessment & Plan:  Current chronicity: stable    Current Medication: Ambien    Prescribed by: our office     Agree on current plan: Continue current plan per provider     Orders:  -     zolpidem (AMBIEN) 10 MG tablet; Take 1 tablet by mouth nightly as needed for Sleep for up to 30 days. , Disp-90 tablet, R-1Normal  3. Gastroesophageal reflux disease with esophagitis without hemorrhage  Assessment & Plan:  Chronic- stable. After speaking with patient:    Agree with current plan, and would agree to continue this plan per prescribing and managing physician. Orders:  -     zolpidem (AMBIEN) 10 MG tablet; Take 1 tablet by mouth nightly as needed for Sleep for up to 30 days. , Disp-90 tablet, R-1Normal  4. Seasonal allergic rhinitis, unspecified trigger  Assessment & Plan:  Chronic- stable. After speaking with patient:    Agree with current plan, and would agree to continue this plan per prescribing and managing physician. Orders:  -     zolpidem (AMBIEN) 10 MG tablet; Take 1 tablet by mouth nightly as needed for Sleep for up to 30 days. , Disp-90 tablet, R-1Normal       - After pulling data and reviewing it   - Reviewed compliance download with patient    -Medically necessary supplies and parts as needed for his machine.   - Continue medications per his primary care provider and other physicians.   - Encouraged to limit caffeine use after 3pm.    - unable to  Encouraged him to work on weight loss through diet and exercise  - Educated not to drive when feeling sleepy   - Patient using [address] with the machine  Office locations  Calling for refills when needed  OARRS Viewed today  Compliance  Follow up  After speaking to the patient, patient is currently stable. We will continue with the current machine settings  Recall Information and DME contact     The chronic medical conditions listed are directly related to the primary diagnosis listed above. The management of the primary diagnosis affects the secondary diagnosis and vice versa.     - Will follow up in off in 6 months    Electronically signed by  Deondre Tohmas, SASHA, RN, CNP on 9/24/2021 at 12:09 PM

## 2021-09-27 ENCOUNTER — PATIENT MESSAGE (OUTPATIENT)
Dept: FAMILY MEDICINE CLINIC | Age: 51
End: 2021-09-27

## 2021-09-27 ENCOUNTER — PATIENT MESSAGE (OUTPATIENT)
Dept: PSYCHIATRY | Age: 51
End: 2021-09-27

## 2021-09-27 RX ORDER — FLUOXETINE HYDROCHLORIDE 20 MG/1
20 CAPSULE ORAL DAILY
Qty: 90 CAPSULE | Refills: 1 | Status: SHIPPED | OUTPATIENT
Start: 2021-09-27 | End: 2021-12-17 | Stop reason: SDUPTHER

## 2021-09-27 RX ORDER — PANTOPRAZOLE SODIUM 40 MG/1
40 TABLET, DELAYED RELEASE ORAL 2 TIMES DAILY
Qty: 180 TABLET | Refills: 1 | Status: SHIPPED | OUTPATIENT
Start: 2021-09-27 | End: 2022-04-13

## 2021-09-27 NOTE — TELEPHONE ENCOUNTER
From: Kyara Damon  To: Mayra Hammer MD  Sent: 9/27/2021 12:17 PM EDT  Subject: Prescription Question    Hi Doctor Edwina Marie,    I'm in the process of changing my maintenance meds to 90 day prescriptions at the insistence of my insurance company. Asking that the original prescription of the following medication at [facility] be cancelled and sent as a 90 day fill to [facility] store # [phone] at [address] [place] [address]. [facility], phone # [phone]. Prescription in question:     Trazodone 100 mg two tablets at bedtime. Thanks as always for your help.      [facility]

## 2021-09-27 NOTE — TELEPHONE ENCOUNTER
LOV 7/23/21  NOV 12/3/21    Pt is getting:  - Ambien from [facility], cnp  -prozac from Dr Shirley Kang  -trazoiron [place]  -Oxycodone Dr Shirley Kang

## 2021-09-27 NOTE — TELEPHONE ENCOUNTER
From: Cecilia Rivera  To: Ronda Virgen MD  Sent: 9/27/2021 12:14 PM EDT  Subject: Prescription Question    I'm in the process of changing my maintenance meds to 90 day prescriptions at the insistence of my insurance company. Asking that the original prescriptions of the following two medications at [facility] be cancelled and sent as 90 day fills to [employer] [employer] store # [phone] at [address] [place] [address]. [facility], phone # [phone]. Prescriptions in question:    Pantoprazole 40 mg tablets, twice daily  Fluoxetine 20 mg tablets, once daily    Thanks as always for your help. [place]    PS: things are moving forward with cardiology looking into the fainting spells. I am scheduled for a week from Friday to have a loop back recorder implanted to monitor things.

## 2021-09-28 RX ORDER — TRAZODONE HYDROCHLORIDE 100 MG/1
200 TABLET ORAL NIGHTLY
Qty: 180 TABLET | Refills: 1 | Status: SHIPPED | OUTPATIENT
Start: 2021-09-28 | End: 2021-12-17 | Stop reason: SDUPTHER

## 2021-09-30 ENCOUNTER — HOSPITAL ENCOUNTER (EMERGENCY)
Age: 51
Discharge: HOME | End: 2021-10-01
Attending: EMERGENCY MEDICINE
Payer: COMMERCIAL

## 2021-09-30 ENCOUNTER — APPOINTMENT (OUTPATIENT)
Dept: GENERAL RADIOLOGY | Age: 51
End: 2021-09-30
Payer: COMMERCIAL

## 2021-09-30 DIAGNOSIS — R55 SYNCOPE AND COLLAPSE: Primary | ICD-10-CM

## 2021-09-30 LAB
A/G RATIO: 1.7 (ref 1.1–2.2)
ALBUMIN SERPL-MCNC: 3.9 G/DL (ref 3.4–5)
ALP BLD-CCNC: 112 U/L (ref 40–129)
ALT SERPL-CCNC: 10 U/L (ref 10–40)
ANION GAP SERPL CALCULATED.3IONS-SCNC: 9 MMOL/L (ref 3–16)
AST SERPL-CCNC: 11 U/L (ref 15–37)
BASOPHILS ABSOLUTE: 0.1 K/UL (ref 0–0.2)
BASOPHILS RELATIVE PERCENT: 1.6 %
BILIRUB SERPL-MCNC: <0.2 MG/DL (ref 0–1)
BUN BLDV-MCNC: 11 MG/DL (ref 7–20)
CALCIUM SERPL-MCNC: 8.9 MG/DL (ref 8.3–10.6)
CHLORIDE BLD-SCNC: 106 MMOL/L (ref 99–110)
CO2: 24 MMOL/L (ref 21–32)
CREAT SERPL-MCNC: 1 MG/DL (ref 0.9–1.3)
EOSINOPHILS ABSOLUTE: 0.3 K/UL (ref 0–0.6)
EOSINOPHILS RELATIVE PERCENT: 4.1 %
GFR AFRICAN AMERICAN: >60
GFR NON-AFRICAN AMERICAN: >60
GLOBULIN: 2.3 G/DL
GLUCOSE BLD-MCNC: 89 MG/DL (ref 70–99)
HCT VFR BLD CALC: 37.4 % (ref 40.5–52.5)
HEMOGLOBIN: 12.3 G/DL (ref 13.5–17.5)
LIPASE: 30 U/L (ref 13–60)
LYMPHOCYTES ABSOLUTE: 3.7 K/UL (ref 1–5.1)
LYMPHOCYTES RELATIVE PERCENT: 45.8 %
MCH RBC QN AUTO: 29.4 PG (ref 26–34)
MCHC RBC AUTO-ENTMCNC: 32.7 G/DL (ref 31–36)
MCV RBC AUTO: 89.9 FL (ref 80–100)
MONOCYTES ABSOLUTE: 0.5 K/UL (ref 0–1.3)
MONOCYTES RELATIVE PERCENT: 6.4 %
NEUTROPHILS ABSOLUTE: 3.4 K/UL (ref 1.7–7.7)
NEUTROPHILS RELATIVE PERCENT: 42.1 %
PDW BLD-RTO: 15.3 % (ref 12.4–15.4)
PLATELET # BLD: 340 K/UL (ref 135–450)
PMV BLD AUTO: 7.9 FL (ref 5–10.5)
POTASSIUM REFLEX MAGNESIUM: 4.3 MMOL/L (ref 3.5–5.1)
PRO-BNP: 183 PG/ML (ref 0–124)
RBC # BLD: 4.16 M/UL (ref 4.2–5.9)
SODIUM BLD-SCNC: 139 MMOL/L (ref 136–145)
TOTAL PROTEIN: 6.2 G/DL (ref 6.4–8.2)
TROPONIN: <0.01 NG/ML
WBC # BLD: 8.1 K/UL (ref 4–11)

## 2021-09-30 PROCEDURE — 84484 ASSAY OF TROPONIN QUANT: CPT

## 2021-09-30 PROCEDURE — 80053 COMPREHEN METABOLIC PANEL: CPT

## 2021-09-30 PROCEDURE — 85025 COMPLETE CBC W/AUTO DIFF WBC: CPT

## 2021-09-30 PROCEDURE — 99283 EMERGENCY DEPT VISIT LOW MDM: CPT

## 2021-09-30 PROCEDURE — 96374 THER/PROPH/DIAG INJ IV PUSH: CPT

## 2021-09-30 PROCEDURE — 36415 COLL VENOUS BLD VENIPUNCTURE: CPT

## 2021-09-30 PROCEDURE — 93005 ELECTROCARDIOGRAM TRACING: CPT | Performed by: EMERGENCY MEDICINE

## 2021-09-30 PROCEDURE — 96375 TX/PRO/DX INJ NEW DRUG ADDON: CPT

## 2021-09-30 PROCEDURE — 2580000003 HC RX 258: Performed by: EMERGENCY MEDICINE

## 2021-09-30 PROCEDURE — 71045 X-RAY EXAM CHEST 1 VIEW: CPT

## 2021-09-30 PROCEDURE — 83880 ASSAY OF NATRIURETIC PEPTIDE: CPT

## 2021-09-30 PROCEDURE — 6360000002 HC RX W HCPCS: Performed by: EMERGENCY MEDICINE

## 2021-09-30 PROCEDURE — 83690 ASSAY OF LIPASE: CPT

## 2021-09-30 RX ORDER — ONDANSETRON 2 MG/ML
4 INJECTION INTRAMUSCULAR; INTRAVENOUS ONCE
Status: COMPLETED | OUTPATIENT
Start: 2021-09-30 | End: 2021-09-30

## 2021-09-30 RX ORDER — 0.9 % SODIUM CHLORIDE 0.9 %
500 INTRAVENOUS SOLUTION INTRAVENOUS ONCE
Status: COMPLETED | OUTPATIENT
Start: 2021-09-30 | End: 2021-09-30

## 2021-09-30 RX ORDER — FENTANYL CITRATE 50 UG/ML
25 INJECTION, SOLUTION INTRAMUSCULAR; INTRAVENOUS ONCE
Status: COMPLETED | OUTPATIENT
Start: 2021-09-30 | End: 2021-09-30

## 2021-09-30 RX ADMIN — SODIUM CHLORIDE 500 ML: 9 INJECTION, SOLUTION INTRAVENOUS at 22:51

## 2021-09-30 RX ADMIN — FENTANYL CITRATE 25 MCG: 50 INJECTION, SOLUTION INTRAMUSCULAR; INTRAVENOUS at 23:36

## 2021-09-30 RX ADMIN — ONDANSETRON 4 MG: 2 INJECTION INTRAMUSCULAR; INTRAVENOUS at 23:35

## 2021-09-30 ASSESSMENT — PAIN DESCRIPTION - DESCRIPTORS: DESCRIPTORS: ACHING

## 2021-09-30 ASSESSMENT — PAIN DESCRIPTION - ONSET: ONSET: ON-GOING

## 2021-09-30 ASSESSMENT — PAIN DESCRIPTION - LOCATION: LOCATION: BACK

## 2021-09-30 ASSESSMENT — PAIN SCALES - GENERAL
PAINLEVEL_OUTOF10: 8
PAINLEVEL_OUTOF10: 8

## 2021-09-30 ASSESSMENT — PAIN DESCRIPTION - PAIN TYPE: TYPE: CHRONIC PAIN;ACUTE PAIN

## 2021-09-30 ASSESSMENT — PAIN - FUNCTIONAL ASSESSMENT: PAIN_FUNCTIONAL_ASSESSMENT: ACTIVITIES ARE NOT PREVENTED

## 2021-10-01 VITALS
WEIGHT: 223.11 LBS | SYSTOLIC BLOOD PRESSURE: 114 MMHG | BODY MASS INDEX: 30.22 KG/M2 | DIASTOLIC BLOOD PRESSURE: 74 MMHG | OXYGEN SATURATION: 96 % | TEMPERATURE: 98.3 F | RESPIRATION RATE: 17 BRPM | HEART RATE: 54 BPM | HEIGHT: 72 IN

## 2021-10-01 LAB
BILIRUBIN URINE: NEGATIVE
BLOOD, URINE: NEGATIVE
CLARITY: CLEAR
COLOR: YELLOW
EKG ATRIAL RATE: 52 BPM
EKG DIAGNOSIS: NORMAL
EKG P AXIS: 57 DEGREES
EKG P-R INTERVAL: 178 MS
EKG Q-T INTERVAL: 408 MS
EKG QRS DURATION: 88 MS
EKG QTC CALCULATION (BAZETT): 379 MS
EKG R AXIS: 6 DEGREES
EKG T AXIS: 41 DEGREES
EKG VENTRICULAR RATE: 52 BPM
GLUCOSE URINE: NEGATIVE MG/DL
KETONES, URINE: NEGATIVE MG/DL
LEUKOCYTE ESTERASE, URINE: NEGATIVE
MICROSCOPIC EXAMINATION: NORMAL
NITRITE, URINE: NEGATIVE
PH UA: 6 (ref 5–8)
PROTEIN UA: NEGATIVE MG/DL
SPECIFIC GRAVITY UA: 1.01 (ref 1–1.03)
URINE REFLEX TO CULTURE: NORMAL
URINE TYPE: NORMAL
UROBILINOGEN, URINE: 1 E.U./DL

## 2021-10-01 PROCEDURE — 93010 ELECTROCARDIOGRAM REPORT: CPT | Performed by: INTERNAL MEDICINE

## 2021-10-01 PROCEDURE — 81003 URINALYSIS AUTO W/O SCOPE: CPT

## 2021-10-01 RX ORDER — HYDROCODONE BITARTRATE AND ACETAMINOPHEN 5; 325 MG/1; MG/1
1 TABLET ORAL ONCE
Status: DISCONTINUED | OUTPATIENT
Start: 2021-10-01 | End: 2021-10-01 | Stop reason: HOSPADM

## 2021-10-01 ASSESSMENT — ENCOUNTER SYMPTOMS
WHEEZING: 0
COLOR CHANGE: 0
NAUSEA: 0
PHOTOPHOBIA: 0
SHORTNESS OF BREATH: 0
VOMITING: 0
RHINORRHEA: 0
BACK PAIN: 0

## 2021-10-01 ASSESSMENT — PAIN DESCRIPTION - ONSET: ONSET: ON-GOING

## 2021-10-01 ASSESSMENT — PAIN SCALES - GENERAL
PAINLEVEL_OUTOF10: 8
PAINLEVEL_OUTOF10: 8

## 2021-10-01 ASSESSMENT — PAIN - FUNCTIONAL ASSESSMENT
PAIN_FUNCTIONAL_ASSESSMENT: ACTIVITIES ARE NOT PREVENTED
PAIN_FUNCTIONAL_ASSESSMENT: 0-10

## 2021-10-01 ASSESSMENT — PAIN DESCRIPTION - PROGRESSION: CLINICAL_PROGRESSION: NOT CHANGED

## 2021-10-01 ASSESSMENT — PAIN DESCRIPTION - LOCATION: LOCATION: BACK

## 2021-10-01 ASSESSMENT — PAIN DESCRIPTION - ORIENTATION: ORIENTATION: MID

## 2021-10-01 ASSESSMENT — PAIN DESCRIPTION - DESCRIPTORS: DESCRIPTORS: DISCOMFORT

## 2021-10-01 ASSESSMENT — PAIN DESCRIPTION - PAIN TYPE: TYPE: ACUTE PAIN;CHRONIC PAIN

## 2021-10-01 NOTE — ED PROVIDER NOTES
placed. Patient reports that he has had 2 syncopal episodes today. He states that there are no triggering events or factors and they will occur spontaneously. Denies tremors convulsions tongue biting or loss of control of bowel or urine. Denies any recent changes in medications. Denies associated chest pain or numbness or weakness. Reviewing the patient's medical record he was recently admitted and had a syncope work-up with echocardiogram which was unremarkable. HPI    NursingNotes were reviewed. REVIEW OF SYSTEMS    (2-9 systems for level 4, 10 or more for level 5)     Review of Systems   Constitutional: Negative for activity change, chills and fever. HENT: Negative for congestion and rhinorrhea. Eyes: Negative for photophobia and visual disturbance. Respiratory: Negative for shortness of breath and wheezing. Cardiovascular: Negative for palpitations and leg swelling. Gastrointestinal: Negative for nausea and vomiting. Endocrine: Negative for polydipsia and polyuria. Genitourinary: Negative for difficulty urinating and frequency. Musculoskeletal: Negative for back pain and gait problem. Skin: Negative for color change and rash. Neurological: Positive for syncope. Negative for tremors, weakness, light-headedness, numbness and headaches. Psychiatric/Behavioral: Negative for confusion. The patient is not nervous/anxious. All other systems reviewed and are negative. Except as noted above the remainder of the review of systems was reviewed and negative.        PAST MEDICAL HISTORY     Past Medical History:   Diagnosis Date    Alcoholism [facility])     last drink 2015    Allergic migraine with status migrainosus     Allergic rhinitis, seasonal     Anemia     Arthritis     right knee    Vaughn's esophagus     Benign intracranial hypertension     Cancer (HCC)     renal     Carpal tunnel syndrome     Chronic pain     Depression     GERD (gastroesophageal reflux KNEE ARTHROPLASTY Right 12/17/2019    RIGHT REVISION TIBIA TOTAL KNEE REPLACEMENT performed by Maulik Lu MD at [address] [facility] Right 1/22/2020    RIGHT KNEE IRRIGATION AND DEBRIDEMENT WITH POLY EXCHANGE performed by Ozzie Colón MD at [facility] Right 2/16/2021    RIGHT REMOVAL OF EXPLANT AND TOTAL KNEE REPLACEMENT [facility] Cary [facility]  10/15/13    RIGHT    TOTAL KNEE ARTHROPLASTY Right 8/12/2020    RIGHT ARTHROPLASY  RESECTION WITH INSERTION OF SPACER performed by Maulik Lu MD at [facility]  6-7-2016    UPPER GASTROINTESTINAL ENDOSCOPY  04/02/2018         CURRENT MEDICATIONS       Discharge Medication List as of 10/1/2021 12:55 AM      CONTINUE these medications which have NOT CHANGED    Details   traZODone (DESYREL) 100 MG tablet Take 2 tablets by mouth nightly, Disp-180 tablet, R-1Normal      pantoprazole (PROTONIX) 40 MG tablet Take 1 tablet by mouth 2 times daily, Disp-180 tablet, R-1Normal      FLUoxetine (PROZAC) 20 MG capsule Take 1 capsule by mouth daily, Disp-90 capsule, R-1Normal      zolpidem (AMBIEN) 10 MG tablet Take 1 tablet by mouth nightly as needed for Sleep for up to 30 days. , Disp-90 tablet, R-1Normal      clopidogrel (PLAVIX) 75 MG tablet Take 1 tablet by mouth daily, Disp-90 tablet, R-1Normal      oxyCODONE (ROXICODONE) 5 MG immediate release tablet Take 1 tablet by mouth 4 times daily for 30 days. , Disp-120 tablet, R-0Normal      lidocaine 4 % external patch Apply patch to appropriate area and leave on for 12 hours.   Remove and leave off for 12 hours before replacing with another 1 as needed for pain., Disp-7 patch, R-0, Print      dicyclomine (BENTYL) 20 MG tablet Take 1 tablet by mouth 3 times daily (with meals), Disp-90 tablet, R-3Normal      gabapentin (NEURONTIN) 600 MG tablet Take 600 mg by mouth 3 times daily. Historical Med      atorvastatin (LIPITOR) 40 MG tablet Take 1 tablet by mouth nightly At bedtime, Disp-90 tablet, R-1Normal      sildenafil (REVATIO) 20 MG tablet Take 4 tablets by mouth as needed (30 min prior to intercourse), Disp-30 tablet, R-0Normal      calcium-vitamin D (OSCAL 500/200 D-3) 500-200 MG-UNIT per tablet Take 1 tablet by mouth 2 times daily Historical Med      Multiple Vitamin TABS Take 1 tablet by mouth daily                   Nsaids, Morphine, Prochlorperazine, Valtrex [valacyclovir hcl], Morphine and related, and Tolmetin    FAMILY HISTORY       Family History   Problem Relation Age of Onset    Cancer Father         Lymphoma    Arthritis Mother     Heart Disease Maternal Uncle     Heart Disease Maternal Uncle     Diabetes Maternal Uncle           SOCIAL HISTORY       Social History     Socioeconomic History    Marital status:      Spouse name: Jefferson Fong Number of children: 2    Years of education: Not on file    Highest education level: Not on file   Occupational History    Occupation: [employer], [employer] his son.    Tobacco Use    Smoking status: Former Smoker     Packs/day: 0.50     Years: 25.00     Pack years: 12.50     Types: Cigarettes     Quit date: 2021     Years since quittin.4    Smokeless tobacco: Never Used    Tobacco comment: Patient vapes   Vaping Use    Vaping Use: Every day    Substances: Flavoring    Devices: Refillable tank   Substance and Sexual Activity    Alcohol use: No     Alcohol/week: 0.0 standard drinks     Comment: last drink     Drug use: Yes     Types: Marijuana     Comment: medical marjiuana card    Sexual activity: Yes     Partners: Female     Comment: wife Kayleigh Fallow   Other Topics Concern    Not on file   Social History Narrative    Not on file     Social Determinants of Health     Financial Resource Strain:     Difficulty of Paying Living Expenses:    Food Insecurity:     Worried About Running Out of Food in the Last Year:    [address] in the Last Year:    Transportation Needs:     Lack of Transportation (Medical):  Lack of Transportation (Non-Medical):    Physical Activity:     Days of Exercise per Week:     Minutes of Exercise per Session:    Stress:     Feeling of Stress :    Social Connections:     Frequency of Communication with Friends and Family:     Frequency of Social Gatherings with Friends and Family:     Attends [employer] Services:     Active Member of Clubs or Organizations:     Attends Club or Organization Meetings:     Marital Status:    Intimate Partner Violence:     Fear of Current or Ex-Partner:     Emotionally Abused:     Physically Abused:     Sexually Abused:        SCREENINGS             PHYSICAL EXAM    (up to 7 for level 4, 8 or more for level 5)     ED Triage Vitals [09/30/21 2203]   BP Temp Temp Source Pulse Resp SpO2 Height Weight   115/78 98.3 °F (36.8 °C) Oral 54 15 97 % 6' (1.829 m) 223 lb 1.7 oz (101.2 kg)       Physical Exam  Vitals and nursing note reviewed. Constitutional:       General: He is not in acute distress. Appearance: He is well-developed. HENT:      Head: Normocephalic and atraumatic. Eyes:      Extraocular Movements: Extraocular movements intact. Conjunctiva/sclera: Conjunctivae normal.      Pupils: Pupils are equal, round, and reactive to light. Neck:      Trachea: No tracheal deviation. Cardiovascular:      Rate and Rhythm: Normal rate and regular rhythm. Pulmonary:      Effort: Pulmonary effort is normal.      Breath sounds: Normal breath sounds. No wheezing or rales. Abdominal:      General: There is no distension. Palpations: Abdomen is soft. Tenderness: There is no abdominal tenderness. Musculoskeletal:         General: No deformity. Normal range of motion. Cervical back: Normal range of motion. Skin:     General: Skin is warm and dry.       Capillary Refill: Capillary refill takes less than 2 seconds. Neurological:      General: No focal deficit present. Mental Status: He is alert and oriented to person, place, and time. Mental status is at baseline. Cranial Nerves: No cranial nerve deficit. Sensory: No sensory deficit. Motor: No weakness. Gait: Gait normal.         RESULTS     EKG: All EKG's are interpreted by the Emergency Department Physician who either signs or Co-signsthis chart in the absence of a cardiologist.    EKG shows a sinus rhythm with a ventricular of 52 bpm.  IL interval and QTc interval within normal limits. Patient has a normal axis/there are no significant ST elevations or depressions EKG is nondiagnostic for ACS. Compared EKG from 8/5/2021 did not appreciate any significant changes    RADIOLOGY:   Non-plain filmimages such as CT, Ultrasound and MRI are read by the radiologist. Plain radiographic images are visualized and preliminarily interpreted by the emergency physician with the below findings:      Interpretation per the Radiologist below, if available at the time ofthis note:    XR CHEST PORTABLE   Final Result   Stable chest x-ray with no acute cardiopulmonary process. ED BEDSIDE ULTRASOUND:   Performed by ED Physician - none    LABS:  Labs Reviewed   CBC WITH AUTO DIFFERENTIAL - Abnormal; Notable for the following components:       Result Value    RBC 4.16 (*)     Hemoglobin 12.3 (*)     Hematocrit 37.4 (*)     All other components within normal limits    Narrative:     Performed at:  [facility]  [address] [facility]   Phone [phone]   COMPREHENSIVE METABOLIC PANEL W/ REFLEX TO MG FOR LOW K - Abnormal; Notable for the following components:     Total Protein 6.2 (*)     AST 11 (*)     All other components within normal limits    Narrative:     Performed at:  [address] [facility] [address] [facility]  [address] [facility]   Phone [phone] - Abnormal; Notable for the following components:    Pro- (*)     All other components within normal limits    Narrative:     Performed at:  [facility]) [facility]  [address] [place] [facility]   Phone [phone]   LIPASE    Narrative:     Performed at:  [address] [facility] [address] [facility] [address] [facility]  [address] [facility]   Phone [phone]   TROPONIN    Narrative:     Performed at:  [address] [facility] [address] [facility]  [address] [facility]   Phone [phone]   URINE RT REFLEX TO CULTURE    Narrative:     Performed at:  [facility]  [address] [facility]   Phone [phone]       All other labs were within normal range or not returned as of this dictation. EMERGENCY DEPARTMENT COURSE and DIFFERENTIAL DIAGNOSIS/MDM:   Vitals:    Vitals:    10/01/21 0000 10/01/21 0015 10/01/21 0030 10/01/21 0045   BP: 116/82 117/78 113/83 114/74   Pulse: 52 55 54 54   Resp: 12 11 13 17   Temp:       TempSrc:       SpO2: 93% 93%  96%   Weight:       Height:           Patient was given thefollowing medications:  Medications   0.9 % sodium chloride bolus (0 mLs IntraVENous Stopped 9/30/21 2351)   fentaNYL (SUBLIMAZE) injection 25 mcg (25 mcg IntraVENous Given 9/30/21 2336)   ondansetron (ZOFRAN) injection 4 mg (4 mg IntraVENous Given 9/30/21 2335)       ED COURSE & MEDICAL DECISION MAKING    Pertinent Labs & Imaging studies reviewed. (See chart for details)   -  Patient seen and evaluated in the emergency department. -  Triage and nursing notes reviewed and incorporated. -  Old chart records reviewed and incorporated.   -  Differential diagnosis includes: Differential Diagnosis: Cardiac arrhythmia, drop attack from a subarachnoid hemorrhage, sepsis, dehydration, vasovagal syncope, other    -  Work-up included:  See above  -  ED treatment included: See above  -  Results discussed with patient. Patient presents the ED for evaluation of recurrent syncopal episodes. He does have close follow-up with cardiology and neurology and has had a recent admission for evaluation. Denies any recent injuries associated with the most recent episode. Labs show no emergent laboratory normalities. Imaging studies show no emergent radiographic findings. Patient has recent admission and does have close outpatient follow-up believe he is safe for discharge home. Orthostatics were negative in the emergency department. Patient feels improved on reevaluation. Symptomatic treatment with expectant management discussed with the patient and they and/or family members present are amenable to treatment plan and outpatient follow-up. Strict return precautions were discussed with the patient and those present. They demonstrated understanding of when to return to the emergency department for new or worsening symptoms. .  The patient is agreeable with plan of care and disposition. REASSESSMENT          CRITICAL CARE TIME   Total Critical Care time was 0 minutes, excluding separately reportable procedures. There was a high probability of clinically significant/life threatening deterioration in the patient's condition which required my urgent intervention. CONSULTS:  None    PROCEDURES:  Unless otherwise noted below, none     Procedures    FINAL IMPRESSION      1.  Syncope and collapse          DISPOSITION/PLAN   DISPOSITION Decision To Discharge 10/01/2021 12:47:14 AM      PATIENT REFERREDTO:  Dai Flower MD  [address]  [address] [place] [address]  [phone]    Schedule an appointment as soon as possible for a visit   As needed      DISCHARGEMEDICATIONS:  Discharge Medication List as of 10/1/2021 12:55 AM             (Please note that portions of this note were completed with a voice recognition program.  Efforts were made to edit the dictations but occasionally words are mis-transcribed.)    Ron Hendrix MD (electronically signed)  Attending Emergency Physician          Ron Hendrix MD  10/01/21 2286

## 2021-10-01 NOTE — ED NOTES
Orthostatics:     Lyin/71, 57, 95%  Sittin/79, 62,95%  Standin/72,75,94%     Tiffany Acevedo RN  10/01/21 8153

## 2021-10-01 NOTE — ED NOTES
Pt states he would like something else for pain. States last medicine did nothing.      Ne Padgett RN  10/01/21 0111

## 2021-10-11 ENCOUNTER — HOSPITAL ENCOUNTER (EMERGENCY)
Age: 51
Discharge: HOME | End: 2021-10-11
Attending: EMERGENCY MEDICINE
Payer: COMMERCIAL

## 2021-10-11 ENCOUNTER — APPOINTMENT (OUTPATIENT)
Dept: GENERAL RADIOLOGY | Age: 51
End: 2021-10-11
Payer: COMMERCIAL

## 2021-10-11 VITALS
SYSTOLIC BLOOD PRESSURE: 120 MMHG | HEIGHT: 72 IN | OXYGEN SATURATION: 98 % | HEART RATE: 59 BPM | DIASTOLIC BLOOD PRESSURE: 73 MMHG | BODY MASS INDEX: 28.96 KG/M2 | WEIGHT: 213.85 LBS | RESPIRATION RATE: 16 BRPM | TEMPERATURE: 98.4 F

## 2021-10-11 DIAGNOSIS — M54.50 ACUTE EXACERBATION OF CHRONIC LOW BACK PAIN: Primary | ICD-10-CM

## 2021-10-11 DIAGNOSIS — G89.29 ACUTE EXACERBATION OF CHRONIC LOW BACK PAIN: Primary | ICD-10-CM

## 2021-10-11 PROCEDURE — 72100 X-RAY EXAM L-S SPINE 2/3 VWS: CPT

## 2021-10-11 PROCEDURE — 99283 EMERGENCY DEPT VISIT LOW MDM: CPT

## 2021-10-11 RX ORDER — METHOCARBAMOL 500 MG/1
TABLET, FILM COATED ORAL
COMMUNITY
Start: 2021-09-06 | End: 2021-10-11

## 2021-10-11 RX ORDER — OXYCODONE HYDROCHLORIDE AND ACETAMINOPHEN 5; 325 MG/1; MG/1
TABLET ORAL
COMMUNITY
Start: 2021-09-10 | End: 2021-11-16 | Stop reason: SDUPTHER

## 2021-10-11 RX ORDER — METHYLPREDNISOLONE 4 MG/1
TABLET ORAL
COMMUNITY
Start: 2021-09-14 | End: 2021-10-12

## 2021-10-11 RX ORDER — ORPHENADRINE CITRATE 100 MG/1
TABLET, EXTENDED RELEASE ORAL
COMMUNITY
Start: 2021-08-21 | End: 2021-11-15

## 2021-10-11 ASSESSMENT — PAIN DESCRIPTION - DESCRIPTORS
DESCRIPTORS: DISCOMFORT
DESCRIPTORS: ACHING

## 2021-10-11 ASSESSMENT — PAIN DESCRIPTION - LOCATION
LOCATION: BACK
LOCATION: BACK

## 2021-10-11 ASSESSMENT — PAIN SCALES - GENERAL
PAINLEVEL_OUTOF10: 8
PAINLEVEL_OUTOF10: 8

## 2021-10-11 ASSESSMENT — PAIN DESCRIPTION - ONSET
ONSET: ON-GOING
ONSET: ON-GOING

## 2021-10-11 ASSESSMENT — PAIN DESCRIPTION - ORIENTATION
ORIENTATION: MID
ORIENTATION: MID

## 2021-10-11 ASSESSMENT — PAIN DESCRIPTION - PROGRESSION: CLINICAL_PROGRESSION: NOT CHANGED

## 2021-10-11 ASSESSMENT — PAIN DESCRIPTION - PAIN TYPE
TYPE: CHRONIC PAIN
TYPE: ACUTE PAIN

## 2021-10-12 ENCOUNTER — HOSPITAL ENCOUNTER (OUTPATIENT)
Dept: CARDIAC CATH/INVASIVE PROCEDURES | Age: 51
Discharge: HOME | End: 2021-10-12
Payer: COMMERCIAL

## 2021-10-12 VITALS
DIASTOLIC BLOOD PRESSURE: 54 MMHG | RESPIRATION RATE: 18 BRPM | SYSTOLIC BLOOD PRESSURE: 103 MMHG | TEMPERATURE: 98.2 F | OXYGEN SATURATION: 96 % | HEART RATE: 66 BPM

## 2021-10-12 DIAGNOSIS — R55 SYNCOPE, UNSPECIFIED SYNCOPE TYPE: ICD-10-CM

## 2021-10-12 PROCEDURE — 6370000000 HC RX 637 (ALT 250 FOR IP): Performed by: INTERNAL MEDICINE

## 2021-10-12 PROCEDURE — 2500000003 HC RX 250 WO HCPCS

## 2021-10-12 PROCEDURE — C1764 EVENT RECORDER, CARDIAC: HCPCS

## 2021-10-12 PROCEDURE — 33285 INSJ SUBQ CAR RHYTHM MNTR: CPT | Performed by: INTERNAL MEDICINE

## 2021-10-12 PROCEDURE — 33285 INSJ SUBQ CAR RHYTHM MNTR: CPT | Performed by: FAMILY MEDICINE

## 2021-10-12 PROCEDURE — 6370000000 HC RX 637 (ALT 250 FOR IP)

## 2021-10-12 RX ORDER — SODIUM CHLORIDE 0.9 % (FLUSH) 0.9 %
5-40 SYRINGE (ML) INJECTION EVERY 12 HOURS SCHEDULED
OUTPATIENT
Start: 2021-10-12

## 2021-10-12 RX ORDER — SODIUM CHLORIDE 9 MG/ML
25 INJECTION, SOLUTION INTRAVENOUS PRN
OUTPATIENT
Start: 2021-10-12

## 2021-10-12 RX ORDER — LORAZEPAM 1 MG/1
2 TABLET ORAL ONCE
Status: COMPLETED | OUTPATIENT
Start: 2021-10-12 | End: 2021-10-12

## 2021-10-12 RX ORDER — SODIUM CHLORIDE 0.9 % (FLUSH) 0.9 %
5-40 SYRINGE (ML) INJECTION PRN
OUTPATIENT
Start: 2021-10-12

## 2021-10-12 RX ADMIN — LORAZEPAM 2 MG: 1 TABLET ORAL at 10:46

## 2021-10-12 ASSESSMENT — PAIN DESCRIPTION - PAIN TYPE: TYPE: CHRONIC PAIN

## 2021-10-12 ASSESSMENT — PAIN DESCRIPTION - LOCATION: LOCATION: BACK

## 2021-10-12 ASSESSMENT — PAIN SCALES - GENERAL: PAINLEVEL_OUTOF10: 7

## 2021-10-12 ASSESSMENT — PAIN DESCRIPTION - ORIENTATION: ORIENTATION: MID

## 2021-10-12 NOTE — H&P
H&P Update    I have reviewed the history and physical from Dr. Babatunde Donaldson (see below) and examined the patient and find no relevant changes. I have reviewed with the patient and/or family the risks, benefits, and alternatives to the procedure. Pre-sedation Assessment    Patient:  Alka Serrano   :   1970  Intended Procedure: ILR implantation      [facility] nurses notes reviewed and agreed. Medications reviewed  Allergies: Allergies   Allergen Reactions    Nsaids Nausea Only and Other (See Comments)     Hx of Barretts esophagus      Morphine Hives and Itching     Pt gets Hives/itching. Does not tolerate     Prochlorperazine Other (See Comments)     No allergic reaction, patient reported sense of \"restlessness\" and fidgiting    Valtrex [Valacyclovir Hcl] Diarrhea    Fentanyl Itching    Morphine And Related Hives and Itching    Tolmetin Nausea Only     Hx of Barretts esophagus         Pre-Procedure Assessment/Plan:  ASA 2 - Patient with mild systemic disease with no functional limitations    Level of Sedation Plan:No sedation    Post Procedure plan: Return to same level of care    ---------------------------------------    [address]  Cardiology Consult     Alka Serrano  1970     Reason for Referral: PFO     CC: \"I have been blacking out. \"        Subjective:      History of Present Illness:     Alka Serrano is a 49 y.o. patient with a PMH significant for nicotine addiction and CVA (). He was seen by his primary care provider 3/10/2021 after and episode of left hand weakness. An outpatient MRI and carotid doppler we ordered along with a referral to cardiology. He was evaluated in the emergency room on 3/28/24458 with leg pain nad was found to have a DVT and started on Xarelto. Today, he is her for follow up s/p PFO closure. He has had multiple recent syncopal episodes. He is unsure why this has been happening.  He has no warning that this is going to happen and he just wakes up on the floor. Patient denies exertional chest pain/pressure, dyspnea at rest, SALAZAR, PND, orthopnea, palpitations, lightheadedness, weight changes, changes in LE edema, and syncope.      Past Medical History:   has a past medical history of Alcoholism ([address] Allergic migraine with status migrainosus, Allergic rhinitis, seasonal, Anemia, Arthritis, Vaughn's esophagus, Benign intracranial hypertension, Cancer ([address] Carpal tunnel syndrome, Chronic pain, Depression, GERD (gastroesophageal reflux disease), Headache(784.0), History of blood transfusion, History of tobacco use, Migraine, Morbid obesity ([address] Movement disorder, Onychomycosis, Sleep apnea, obstructive, Unspecified cerebral artery occlusion with cerebral infarction, Use of cane as ambulatory aid, Wears dentures, and Wears glasses.     Surgical History:   has a past surgical history that includes Gastric bypass surgery (Jan 2009); Splenectomy; LASIK (2005); knee surgery (July 2012); Carpal tunnel release; laparotomy; Knee arthroscopy (Right, 7/11/2013); Knee arthroscopy (Right, 7/11/12); Total knee arthroplasty (10/15/13); Endoscopy, colon, diagnostic; Dilatation, esophagus; eye surgery; joint replacement; Abdominal exploration surgery (1/11/16); other surgical history (Left, 03/08/2016); hernia repair (3-); Upper gastrointestinal endoscopy (6-7-2016); other surgical history (2016); Upper gastrointestinal endoscopy (04/02/2018); Kidney removal (Left, 08/01/2018); Abdomen surgery; Abdomen surgery (4-7-2016); Revision total knee arthroplasty (Right, 12/17/2019); Revision total knee arthroplasty (Right, 1/22/2020); knee surgery (Right, 2/18/2020); Total knee arthroplasty (Right, 8/12/2020); Revision total knee arthroplasty (Right, 2/16/2021); knee surgery (Right, 2/26/2021); and knee surgery (Right, 3/5/2021).    Social History:   reports that he quit smoking about 4 months ago. His smoking use included cigarettes.  He has a 12.50 pack-year smoking history. He has never used smokeless tobacco. He reports current drug use. Drug: Marijuana. He reports that he does not drink alcohol.      Family History:  family history includes Arthritis in his mother; Cancer in his father; Diabetes in his maternal uncle; Heart Disease in his maternal uncle and maternal uncle.     Home Medications:  Were reviewed and are listed in nursing record and/or below  Home Medications           Prior to Admission medications    Medication Sig Start Date End Date Taking? Authorizing Provider   clopidogrel (PLAVIX) 75 MG tablet Take 1 tablet by mouth daily 9/16/21   Yes Paola Kelley MD   oxyCODONE (ROXICODONE) 5 MG immediate release tablet Take 1 tablet by mouth 4 times daily for 30 days. 9/14/21 10/14/21 Yes Hermelinda Zee MD   lidocaine 4 % external patch Apply patch to appropriate area and leave on for 12 hours. Remove and leave off for 12 hours before replacing with another 1 as needed for pain. 9/12/21   Yes Annemarie Yanes PA-C   pantoprazole (PROTONIX) 40 MG tablet Take 1 tablet by mouth 2 times daily 8/25/21   Yes Hermelinda Zee MD   dicyclomine (BENTYL) 20 MG tablet Take 1 tablet by mouth 3 times daily (with meals) 8/25/21   Yes Hermelinda Zee MD   gabapentin (NEURONTIN) 600 MG tablet Take 600 mg by mouth 3 times daily.  8/2/21   Yes Historical Provider, MD   traZODone (DESYREL) 100 MG tablet Take 2 tablets by mouth nightly 7/23/21   Yes Christianne Durham MD   atorvastatin (LIPITOR) 40 MG tablet Take 1 tablet by mouth nightly At bedtime 7/7/21   Yes Hermelinda Zee MD   FLUoxetine (PROZAC) 20 MG capsule TAKE 1 CAPSULE BY MOUTH DAILY 6/25/21   Yes Hermelinda Zee MD   sildenafil (REVATIO) 20 MG tablet Take 4 tablets by mouth as needed (30 min prior to intercourse) 1/11/21   Yes Hermelinda Zee MD   calcium-vitamin D (OSCAL 500/200 D-3) 500-200 MG-UNIT per tablet Take 1 tablet by mouth 2 times daily      Yes Historical Provider, MD   Multiple Eyes:  Pupils equal and round. No scleral icterus. Mouth: Moist mucosa, no pharyngeal erythema. Nose: Nares normal. No drainage or sinus tenderness. Neck: Supple, symmetrical, trachea midline. No adenopathy. No tenderness/mass/nodules. No carotid bruit or elevated JVD. Lungs:   Respiratory Effort: Normal   Auscultation: Clear to auscultation bilaterally, respirations unlabored. No wheeze, rales   Chest Wall:  No tenderness or deformity. Cardiovascular:     Pulses  Palpation: normal   Ascultation: Regular rate, S1/ S2 normal. No murmur, rub, or gallop. 2+ radial and pedal pulses, symmetric  Carotid  Femoral   Abdomen and Gastrointestinal:   Soft, non-tender, bowel sounds active. Liver and Spleen  Masses   Musculoskeletal: No muscle wasting  Back  Gait   Extremities: Extremities normal, atraumatic. No cyanosis or edema. No cyanosis clubbing         Skin: Inspection and palpation performed, no rashes or lesions. Pysch: Normal mood and affect.  Alert and oriented to time place person   Neurologic: Normal gross motor and sensory exam.       Labs      All labs have been reviewed           Lab Results   Component Value Date     WBC 4.9 07/30/2021     RBC 4.55 07/30/2021     RBC 4.94 01/07/2015     HGB 13.5 07/30/2021     HCT 40.3 07/30/2021     MCV 88.5 07/30/2021     RDW 20.2 07/30/2021      07/30/2021            Lab Results   Component Value Date      07/30/2021     K 4.6 07/30/2021     K 4.4 06/22/2021      07/30/2021     CO2 28 07/30/2021     BUN 7 07/30/2021     CREATININE 1.0 07/30/2021     GFRAA >60 07/30/2021     AGRATIO 1.7 06/25/2021     LABGLOM >60 07/30/2021     GLUCOSE 86 07/30/2021     GLUCOSE 84 01/07/2015     PROT 6.4 06/25/2021     PROT 6.7 01/07/2015     CALCIUM 9.3 07/30/2021     BILITOT 0.5 06/25/2021     ALKPHOS 100 06/25/2021     AST 11 06/25/2021     ALT 8 06/25/2021      No results found for: PTINR        Lab Results   Component Value Date     LABA1C 5.1 02/09/2021 day event monitor 5/2021. I will have the ILR scheduled with EP.         CVA/TIA multiple episodes. Remote infarct involving the posterior right insular cortex and right parietal lobe. 30 day monitor did not show any arrhythmia. No recurrence of symptoms.      PFO  S/p PFO closure 8/5/2021. Continue Plavix and statin. Will need prophylactic antibiotics for at least 1 year. Repeat echocardiogram in 6 months and 1 year.         DVT   Continue Xarelto.      Nicotine addiction  Encouraged smoking cessation.      Follow up in 6 months.      Thank you for allowing us to participate in the care of Dayanara Clements.   Please do not hesitate to contact me if you have any questions.     Shikha Sam MD, MPH     [facility], [facility] [address]  Ph: [phone]  Fax: [phone]

## 2021-10-12 NOTE — ED PROVIDER NOTES
eMERGENCY dEPARTMENT eNCOUnter      Pt Name: Abi Kwan  MRN: [id number]  [place] 1970  Date of evaluation: 10/11/2021  Provider: Lou Marks MD     [address] [place] [address]       Chief Complaint   Patient presents with    Back Pain     Pt has an ongoing issue with possible syncopal episodes. He is being followed by neurology and cardiology. Pt had an episode today injuring his mid back. Pt is concerned he made his back worse and is in more pain. HISTORY OF PRESENT ILLNESS   (Location/Symptom, Timing/Onset,Context/Setting, Quality, Duration, Modifying Factors, Severity) Note limiting factors. HPI    Abi Kwan is a 49 y.o. male who presents to the emergency department with history of syncope is under evaluation neurology and cardiology supposed to get a loop recorder inserted tomorrow had a syncopal episode today. He landed on his back on the grass. Complain low back pain. Patient has chronic back history he is on pain management. Is on Percocet. Has a L1 burst fracture in the past MRI recently last month. Patient is able to ambulate complain of just low back pain. He appears to be in no distress. Nursing Notes were reviewed. REVIEW OFSYSTEMS    (2+ for level 4; 10+ for level 5)   Review of Systems    General: No fevers, chills or night sweats, No weight loss    Head:  No Sore throat,  No Ear Pain    Chest:  Nontender. No Cough, No SOB,  Chest Pain    GI: No abdominal pain or vomiting    : No dysuria or hematuria    Musculoskeletal: No unrelenting pain or night pain    Neurologic: No bowel or bladder incontinence, No saddle anesthesia, No leg weakness    All other systems reviewed and are negative.         PAST MEDICAL HISTORY     Past Medical History:   Diagnosis Date    Alcoholism [facility])     last drink 2015    Allergic migraine with status migrainosus     Allergic rhinitis, seasonal     Anemia     Arthritis     right knee    Vaughn's esophagus     Benign intracranial hypertension     Cancer ([facility]     renal     Carpal tunnel syndrome     Chronic pain     Depression     GERD (gastroesophageal reflux disease)     Headache(784.0)     History of blood transfusion     History of tobacco use     Quit 7/2014    Migraine     chronic for 1 year    Morbid obesity ([facility]     Movement disorder     Onychomycosis     Sleep apnea, obstructive     Severe uses cpap stop bang 6    Unspecified cerebral artery occlusion with cerebral infarction 2014    slight weakness in left arm    Use of cane as ambulatory aid     Wears dentures     full set    Wears glasses        SURGICAL HISTORY       Past Surgical History:   Procedure Laterality Date    ABDOMEN SURGERY      gastric bypass    ABDOMEN SURGERY  4-7-2016    repair of recurrent incisional hernia with mesh, removal of old mesh, bilateral component separation    ABDOMINAL EXPLORATION SURGERY  1/11/16    exp lap, lysis of adhesions, small bowel resection    CARPAL TUNNEL RELEASE      bilat    DILATATION, ESOPHAGUS      ENDOSCOPY, COLON, DIAGNOSTIC      EYE SURGERY      GASTRIC BYPASS SURGERY  Jan 2009    Has lost about 200 pounds.     HERNIA REPAIR  3-    ventral    JOINT REPLACEMENT      KIDNEY REMOVAL Left 08/01/2018    KNEE ARTHROSCOPY Right 7/11/2013    Dr.Robert Sanders     KNEE ARTHROSCOPY Right 7/11/12    RIGHT KNEE ARTHROSCOPY WITH CHONDROPLASTY    KNEE SURGERY  July 2012    right, arthroscopy    KNEE SURGERY Right 2/18/2020    INCISION AND DRAINAGE RIGHT KNEE AND WOULD VAC PLACEMENT performed by Jennifer Sidhu MD at . [facility] Right 2/26/2021    INCISION AND DRAINAGE OF RIGHT KNEE HEMATOMA performed by Sal Avalos MD at . [facility] Right 3/5/2021    INCISION AND DRAINAGE AND CLOSURE RIGHT TOTAL KNEE performed by Sal Avalos MD at [address]  2005    OTHER SURGICAL HISTORY Left 03/08/2016    CT biopsy ablasion left kidney    OTHER SURGICAL HISTORY  2016    small intestine 12 inch removed, 2 hernia surgeries, another surgery to drain infection from incision.  REVISION TOTAL KNEE ARTHROPLASTY Right 12/17/2019    RIGHT REVISION TIBIA TOTAL KNEE REPLACEMENT performed by Brenda Mace MD at [address] [facility] Right 1/22/2020    RIGHT KNEE IRRIGATION AND DEBRIDEMENT WITH POLY EXCHANGE performed by Brandi Ramirez MD at [address] [facility] Right 2/16/2021    RIGHT REMOVAL OF EXPLANT AND TOTAL KNEE REPLACEMENT performed by Addis Suárez MD at [address] [facility]  10/15/13    RIGHT    TOTAL KNEE ARTHROPLASTY Right 8/12/2020    RIGHT ARTHROPLASY  RESECTION WITH INSERTION OF SPACER performed by Brenda Mace MD at [facility]  6-7-2016    UPPER GASTROINTESTINAL ENDOSCOPY  04/02/2018       CURRENT MEDICATIONS       Discharge Medication List as of 10/11/2021 10:02 PM      CONTINUE these medications which have NOT CHANGED    Details   oxyCODONE-acetaminophen (PERCOCET) 5-325 MG per tablet TAKE 1 TABLET BY MOUTH EVERY 6 HOURS FOR UP TO 5 DAYS AS NEEDED FOR SEVERE PAINHistorical Med      traZODone (DESYREL) 100 MG tablet Take 2 tablets by mouth nightly, Disp-180 tablet, R-1Normal      FLUoxetine (PROZAC) 20 MG capsule Take 1 capsule by mouth daily, Disp-90 capsule, R-1Normal      clopidogrel (PLAVIX) 75 MG tablet Take 1 tablet by mouth daily, Disp-90 tablet, R-1Normal      oxyCODONE (ROXICODONE) 5 MG immediate release tablet Take 1 tablet by mouth 4 times daily for 30 days. , Disp-120 tablet, R-0Normal      atorvastatin (LIPITOR) 40 MG tablet Take 1 tablet by mouth nightly At bedtime, Disp-90 tablet, R-1Normal      calcium-vitamin D (OSCAL 500/200 D-3) 500-200 MG-UNIT per tablet Take 1 tablet by mouth 2 times daily Historical Med      Multiple Vitamin TABS Take 1 tablet by mouth daily       orphenadrine (NORFLEX) 100 MG extended release tablet TAKE 1 TABLET BY MOUTH TWICE DAILYHistorical Med      methylPREDNISolone (MEDROL DOSEPACK) 4 MG tablet USE AS DIRECTEDHistorical Med      pantoprazole (PROTONIX) 40 MG tablet Take 1 tablet by mouth 2 times daily, Disp-180 tablet, R-1Normal      zolpidem (AMBIEN) 10 MG tablet Take 1 tablet by mouth nightly as needed for Sleep for up to 30 days. , Disp-90 tablet, R-1Normal      lidocaine 4 % external patch Apply patch to appropriate area and leave on for 12 hours. Remove and leave off for 12 hours before replacing with another 1 as needed for pain., Disp-7 patch, R-0, Print      dicyclomine (BENTYL) 20 MG tablet Take 1 tablet by mouth 3 times daily (with meals), Disp-90 tablet, R-3Normal      gabapentin (NEURONTIN) 600 MG tablet Take 600 mg by mouth 3 times daily. Historical Med      sildenafil (REVATIO) 20 MG tablet Take 4 tablets by mouth as needed (30 min prior to intercourse), Disp-30 tablet, R-0Normal             ALLERGIES     Nsaids, Morphine, Prochlorperazine, Valtrex [valacyclovir hcl], Fentanyl, Morphine and related, and Tolmetin    FAMILY HISTORY       Family History   Problem Relation Age of Onset    Cancer Father         Lymphoma    Arthritis Mother     Heart Disease Maternal Uncle     Heart Disease Maternal Uncle     Diabetes Maternal Uncle         SOCIAL HISTORY       Social History     Socioeconomic History    Marital status:      Spouse name: Tamera Bravo Number of children: 2    Years of education: None    Highest education level: None   Occupational History    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    Tobacco Use    Smoking status: Former Smoker     Packs/day: 0.50     Years: 25.00     Pack years: 12.50     Types: Cigarettes     Quit date: 2021     Years since quittin.4    Smokeless tobacco: Never Used    Tobacco comment: Patient vapes   Vaping Use    Vaping Use: Every day    Substances: Flavoring    Devices: Refillable tank   Substance and Sexual Activity    Alcohol use: No     Alcohol/week: 0.0 standard drinks     Comment: last drink 2015    Drug use: Yes     Types: Marijuana     Comment: medical marmyrauana card    Sexual activity: Yes     Partners: Female     Comment: wife Jessica Dimas   Other Topics Concern    None   Social History Narrative    None     Social Determinants of Health     Financial Resource Strain:     Difficulty of Paying Living Expenses:    Food Insecurity:     Worried About Running Out of Food in the Last Year:     [address] in the Last Year:    Transportation Needs:     Lack of Transportation (Medical):  Lack of Transportation (Non-Medical):    Physical Activity:     Days of Exercise per Week:     Minutes of Exercise per Session:    Stress:     Feeling of Stress :    Social Connections:     Frequency of Communication with Friends and Family:     Frequency of Social Gatherings with Friends and Family:     Attends [employer] Services:     Active Member of Clubs or Organizations:     Attends Club or Organization Meetings:     Marital Status:    Intimate Partner Violence:     Fear of Current or Ex-Partner:     Emotionally Abused:     Physically Abused:     Sexually Abused:        SCREENINGS           PHYSICAL EXAM    (up to 7 for level 4, 8 or more for level 5)     ED Triage Vitals   BP Temp Temp src Pulse Resp SpO2 Height Weight   -- -- -- -- -- -- -- --       Physical Exam    General: Alert and awake ×3. Nontoxic appearance. Well-developed well-nourished 80-year-old white male in no distress  HEENT: Normocephalic atraumatic. Neck is supple. Airway intact. No adenopathy  Cardiac: Regular rate and rhythm with no murmurs rubs or gallops  Pulmonary: Lungs are clear in all lung fields. No wheezing. No Rales. Abdomen: Soft and nontender. Negative hepatosplenomegaly. Bowel sounds are active. Palpation the back tenderness and L1. Patient has heating pad noted marks on there. No evidence of any spasms. Negative straight leg raises patient is able to ambulate. No numbness or tingling sensation going down the legs. Extremities: Moving all extremities. No calf tenderness. Peripheral pulses all intact  Skin: No skin lesions. No rashes  Neurologic: Cranial nerves II through XII was grossly intact. Nonfocal neurological exam  Psychiatric: Patient is pleasant. Mood is appropriate. DIAGNOSTIC RESULTS     EKG (Per Emergency Physician):       RADIOLOGY (Per Emergency Physician): Interpretation per the Radiologist below, if available at the time of this note:  XR LUMBAR SPINE (2-3 VIEWS)    Result Date: 10/11/2021  EXAMINATION: THREE XRAY VIEWS OF THE LUMBAR SPINE 10/11/2021 9:09 pm COMPARISON: CT scan of the abdomen and pelvis from 06/22/2021 HISTORY: ORDERING SYSTEM PROVIDED HISTORY: injury TECHNOLOGIST PROVIDED HISTORY: Reason for exam:->injury Reason for Exam: low back pain from a fall today; pt has a compression fx L1 from a fall 6 weeks ago Acuity: Acute Type of Exam: Initial Mechanism of Injury: fall today and a fall 6 weeks ago causing compression fx FINDINGS: There are 5 lumbar type vertebral bodies. Lumbar lordosis is normal.  L1 fracture is present with loss of height approximately 50%. There is no other evidence for fracture. Minimal anterolisthesis is noted at L4-L5. Alignment is otherwise normal.  There is no disc space narrowing. L1 compression fracture. Interval change cannot be assessed without prior outside studies. No other fracture is identified. ED BEDSIDE ULTRASOUND:   Performed by ED Physician - none    LABS:  Labs Reviewed - No data to display     All other labs were within normal range or not returned as of this dictation.       Procedures      EMERGENCY DEPARTMENT COURSE and DIFFERENTIAL DIAGNOSIS/MDM:   Vitals:    Vitals:    10/11/21 2048   BP: 120/73   Pulse: 59   Resp: 16   Temp: 98.4 °F (36.9 °C)   TempSrc: Oral   SpO2: 98%   Weight: 213 lb 13.5 oz (97 kg) Height: 6' (1.829 m)       Medications - No data to display    MDM. Patient is a 26-year-old under pain management complaint back pain after a fall from a syncopal episode. There is really nothing new on exam patient is reassuring exams reassuring. X-ray of lumbar spine reveals L1 compression fracture unchanged from previous and with the new fractures. Patient was offered some Percocet because he is on pain meds he refused he wanted something IV but told him that because of the type of injury that does not require IV medications lidocaine or p.o. pills will be sufficient. REVAL:         CRITICAL CARE TIME   Total CriticalCare time was 0 minutes, excluding separately reportable procedures. There was a high probability of clinically significant/life threatening deterioration in the patient's condition which required my urgent intervention. CONSULTS:  None    PROCEDURES:  Unless otherwise noted below, none     [unfilled]    FINAL IMPRESSION      1. Acute exacerbation of chronic low back pain          DISPOSITION/PLAN   DISPOSITION        PATIENT REFERRED TO:  Dony Pineda, [address] [address]  [phone]    Schedule an appointment as soon as possible for a visit in 1 week  If symptoms worsen      DISCHARGE MEDICATIONS:  Discharge Medication List as of 10/11/2021 10:02 PM             (Please note:  Portions of this note were completed with a voice recognition program.Efforts were made to edit the dictations but occasionally words and phrases are mis-transcribed.)  Form v2016. [id number]    Lars CORONADO MD (electronically signed)  Emergency Medicine Provider        Jose Lazo MD  10/11/21 0563

## 2021-10-12 NOTE — PROCEDURES
and services. All questions were answered. Patient understood and informed consent was obtained. The patient was brought to the electrophysiology lab in a fasting nonsedated state. Patient was prepped and draped in usual sterile fashion. No moderate sedation (conscious sedation) nor general anesthesia was administered or utilized for this procedure. The patient was monitored continuously with ECG, pulse oximetry, blood pressure monitoring, and direct observation. After injection of 0.5% bupivacaine/lidocaine mixture in the left 4th intercostal space, a 5 mm small incision was made using the Medtronic-provided scalpel, after which a #11 blade was used to expand the opening of this incision on both ends. Then a Medtronic-provided tunneling tool was inserted into this scar in a diagonal trajectory towards the L nipple which created the necessary space to insert the implantable loop recorder. This tool was used to deliver the implantable loop recorder into the created space. Both the plunger and the tunneling tool was then retracted. The surgical scar was taped with Steri-strips and manual pressure was held over the taped area to achieve hemostasis. Specimen collected: none    Estimated blood loss: < 5 cc    The patient tolerated the procedure well without any complications. Device information:   The device is a [employer] LINQ [id number] SN# [id number] with implant date: 10/12/2021  The device was programmed to detect:   VT: 380 ms 16 beats   Bradycardia: 2000 ms     Impression:    Pre- and post-procedural diagnoses of syncope of unclear etiology. Of note, he has also had a history of CVA of unclear etiology. Successful implantation of a Medtronic Implantable Loop Recorder.      Plan:   The patient will have usual post-implant care with a 1-week wound check with our nurse in the [facility] at [facility]. From an EP perspective, the patient may be discharged home today if the patient remains stable. Follow-up also includes routine device interrogation with the Device Clinic. Thank you for allowing us to participate in the care of your patient. If you have any questions or comments, please feel free to contact us.     Simi Trevino MD, MS, [address] [facility]  Cardiac Electrophysiology  [address]  [address] [facility], [address] [place] [address]  Pola Crawford [facility] [address]  [phone]

## 2021-10-14 ENCOUNTER — PATIENT MESSAGE (OUTPATIENT)
Dept: FAMILY MEDICINE CLINIC | Age: 51
End: 2021-10-14

## 2021-10-14 DIAGNOSIS — M94.0 COSTOCHONDRITIS: ICD-10-CM

## 2021-10-14 RX ORDER — OXYCODONE HYDROCHLORIDE 5 MG/1
5 TABLET ORAL 4 TIMES DAILY
Qty: 120 TABLET | Refills: 0 | Status: SHIPPED | OUTPATIENT
Start: 2021-10-14 | End: 2021-11-17 | Stop reason: SDUPTHER

## 2021-10-14 NOTE — TELEPHONE ENCOUNTER
From: Peter Em  To: Judge Kaylyn MD  Sent: 10/14/2021 7:21 AM EDT  Subject: Prescription Question    Good morning Dr. Alyse Vogel,    I am at this point still experiencing a great deal of pain in my back from the fracture and in my sides from wearing the back brace. Writing to request a second fill of oxycodone 5mg to be sent to my [facility] on [address] Thank you as always for your guidance and help.      Jamar Nance

## 2021-10-18 ENCOUNTER — TELEPHONE (OUTPATIENT)
Dept: FAMILY MEDICINE CLINIC | Age: 51
End: 2021-10-18

## 2021-10-18 NOTE — TELEPHONE ENCOUNTER
Pt wife called in stated the pt is having some mental issues and confusion   This started a few days ago the pt seems to be ok during the day but at night the confusion begins pt knows he is at home pt got up one night started pulling stuff out of the freezer left it there and sat down and forgot about it pt has a issue of remembering what he does   Pt wife stated she put away the medicine took the car keys away pt does not work anymore fyi     Pt wife wants to know what to do?

## 2021-10-18 NOTE — TELEPHONE ENCOUNTER
I called LUIS, she said he is pretty much ok during the day. It is at night that things happe,  Told her if he does this tonight, take him to the ED tonight while he is confused and not himself. She said she will do this. And will go to [facility]. If needed.

## 2021-10-19 ENCOUNTER — NURSE ONLY (OUTPATIENT)
Dept: CARDIOLOGY CLINIC | Age: 51
End: 2021-10-19
Payer: COMMERCIAL

## 2021-10-19 DIAGNOSIS — G45.9 TIA (TRANSIENT ISCHEMIC ATTACK): ICD-10-CM

## 2021-10-19 DIAGNOSIS — Z45.09 ENCOUNTER FOR ELECTRONIC ANALYSIS OF REVEAL EVENT RECORDER: Primary | ICD-10-CM

## 2021-10-19 PROCEDURE — 93291 INTERROG DEV EVAL SCRMS IP: CPT | Performed by: INTERNAL MEDICINE

## 2021-10-19 NOTE — PROGRESS NOTES
Pt seen in clinic today for cardiac device interrogation and site check 1 week post implant. Site appears free of infection, wound care instructions reviewed with patient. Their device is a MDT ILR  implanted for syncope   the device appears to be functioning normally. Remaining battery life is \"OK\"    Pt was informed of findings today and general questions have been answered with regard to device. Home monitoring hardware is transmitting.      Results discussed with or to be reviewed by EP

## 2021-11-01 ENCOUNTER — HOSPITAL ENCOUNTER (OUTPATIENT)
Dept: GENERAL RADIOLOGY | Age: 51
Discharge: HOME | End: 2021-11-01
Payer: COMMERCIAL

## 2021-11-01 ENCOUNTER — HOSPITAL ENCOUNTER (OUTPATIENT)
Age: 51
Discharge: HOME | End: 2021-11-01
Payer: COMMERCIAL

## 2021-11-01 DIAGNOSIS — S32.010A CLOSED COMPRESSION FRACTURE OF BODY OF FIRST LUMBAR VERTEBRA (HCC): ICD-10-CM

## 2021-11-01 PROCEDURE — 72100 X-RAY EXAM L-S SPINE 2/3 VWS: CPT

## 2021-11-03 ENCOUNTER — HOSPITAL ENCOUNTER (OUTPATIENT)
Dept: WOMENS IMAGING | Age: 51
Discharge: HOME | End: 2021-11-03
Payer: COMMERCIAL

## 2021-11-03 DIAGNOSIS — S32.010A WEDGE COMPRESSION FRACTURE OF FIRST LUMBAR VERTEBRA, INITIAL ENCOUNTER FOR CLOSED FRACTURE (HCC): ICD-10-CM

## 2021-11-03 PROCEDURE — 77080 DXA BONE DENSITY AXIAL: CPT

## 2021-11-15 ENCOUNTER — OFFICE VISIT (OUTPATIENT)
Dept: FAMILY MEDICINE CLINIC | Age: 51
End: 2021-11-15
Payer: COMMERCIAL

## 2021-11-15 VITALS
BODY MASS INDEX: 29.96 KG/M2 | OXYGEN SATURATION: 98 % | SYSTOLIC BLOOD PRESSURE: 114 MMHG | DIASTOLIC BLOOD PRESSURE: 72 MMHG | HEART RATE: 56 BPM | HEIGHT: 71 IN | WEIGHT: 214 LBS

## 2021-11-15 DIAGNOSIS — M85.89 OSTEOPENIA OF MULTIPLE SITES: Primary | ICD-10-CM

## 2021-11-15 DIAGNOSIS — S32.010A CLOSED COMPRESSION FRACTURE OF BODY OF L1 VERTEBRA (HCC): ICD-10-CM

## 2021-11-15 DIAGNOSIS — Z72.0 TOBACCO USE: ICD-10-CM

## 2021-11-15 DIAGNOSIS — G89.4 CHRONIC PAIN SYNDROME: Chronic | ICD-10-CM

## 2021-11-15 PROBLEM — Q21.12 PFO (PATENT FORAMEN OVALE): Status: RESOLVED | Noted: 2021-04-28 | Resolved: 2021-11-15

## 2021-11-15 PROCEDURE — 99214 OFFICE O/P EST MOD 30 MIN: CPT | Performed by: FAMILY MEDICINE

## 2021-11-15 NOTE — PROGRESS NOTES
11/15/2021    Blood pressure 114/72, pulse 56, height 5' 11\" (1.803 m), weight 214 lb (97.1 kg), SpO2 98 %. Scarlet Brock (:  1970) is a 49 y.o. male, here for evaluation of the following medical concerns:    Chief Complaint   Patient presents with    Follow-up     f/u check     Here for f/u on chronic pain management, chest wall pain, spine pain from rib and vertebral fractures. He had been to ED multiple times due to spine and rib pain related to LOC/falls in aug/sept. His wife reported mental confusion and dizziness (about a month ago) while using oxycodone (current dose is 5 mg 4x/d). He stopped sing bentyl and this resolved without recurrence. He is still on several sedative meds: gabapentin, trazodone, THC edibles (per pain practice recommendation). Also has ketamine infusions in [place] by Dr Ilana Godinez for his chronic abd pain. He was able to stop using ambien after starting MJ/THC. He is still wearing a back brace and experiencing pain. Managed by [facility], say him last week. Repeat x rays show continued compression, wich is reflected by his pain. The plan is to pursue kyphoplasty. Working on insurance approval for this and meets with interventional doctor later this week. They suspect he has osteopenia. BMD done 11/3/21 showed this in hips and spine. He has prior bariatric surgery. Uses calcium/D twice daily. His last vit D level was 51 . He seeks to walk his dogs 4x a day. Tobacco use: cigarettes still used 6-10/day. He has had 3 fractures in the past year from falls: spine, wrist ribs. Cardiology is evaluating the syncope with a loop recorder. He has prior PFO closure. Dr Shante Rai is managing. Not driving since having syncope and starting the oxycodone. Dr Margarita Anderson still managed insomnia and MDD.     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  Chronic pain syndrome    History of stroke: right insular cortex on June 8, 2014 (small PFO; hypergoag w/u neg,  neurology)    Gastroesophageal reflux disease with esophagitis--on daily ppi    Intractable chronic migraine without aura and with status migrainosus    Iron deficiency anemia    B12 deficiency    Malignant neoplasm of left kidney (HCC) clear cell. 8/1/18 Dr Emily Echevarria.  History of depression    History of alcoholism ([facility] [address]    History of total knee arthroplasty, right    Hematoma of right knee region    TIA (transient ischemic attack) LUE weakness 3/21    Tobacco use    PFO (patent foramen ovale)    Acute deep vein thrombosis (DVT) of calf muscle vein of both lower extremities (HCC)    PFO with atrial septal aneurysm    Closed compression fracture of body of L1 vertebra (HCC)        Body mass index is 29.85 kg/m². Wt Readings from Last 3 Encounters:   11/15/21 214 lb (97.1 kg)   10/11/21 213 lb 13.5 oz (97 kg)   09/30/21 223 lb 1.7 oz (101.2 kg)       BP Readings from Last 3 Encounters:   11/15/21 114/72   10/12/21 (!) 103/54   10/11/21 120/73       Allergies   Allergen Reactions    Nsaids Nausea Only and Other (See Comments)     Hx of Barretts esophagus      Morphine Hives and Itching     Pt gets Hives/itching. Does not tolerate     Prochlorperazine Other (See Comments)     No allergic reaction, patient reported sense of \"restlessness\" and fidgiting    Valtrex [Valacyclovir Hcl] Diarrhea    Fentanyl Itching    Morphine And Related Hives and Itching    Tolmetin Nausea Only     Hx of Barretts esophagus       Prior to Visit Medications    Medication Sig Taking?  Authorizing Provider   oxyCODONE-acetaminophen (PERCOCET) 5-325 MG per tablet TAKE 1 TABLET BY MOUTH EVERY 6 HOURS FOR UP TO 5 DAYS AS NEEDED FOR SEVERE PAIN Yes Historical Provider, MD   traZODone (DESYREL) 100 MG tablet Take 2 tablets by mouth nightly Yes Terri Henriquez MD   pantoprazole (PROTONIX) 40 MG tablet Take 1 tablet by mouth 2 times daily Yes Thuy Leyva MD   FLUoxetine (PROZAC) 20 MG capsule Take 1 capsule by mouth daily Yes Thuy Leyva MD   clopidogrel (PLAVIX) 75 MG tablet Take 1 tablet by mouth daily Yes Abelina Lefort, MD   dicyclomine (BENTYL) 20 MG tablet Take 1 tablet by mouth 3 times daily (with meals) Yes Thuy Leyva MD   gabapentin (NEURONTIN) 600 MG tablet Take 600 mg by mouth 3 times daily. Yes Historical Provider, MD   atorvastatin (LIPITOR) 40 MG tablet Take 1 tablet by mouth nightly At bedtime Yes Thuy Leyva MD   sildenafil (REVATIO) 20 MG tablet Take 4 tablets by mouth as needed (30 min prior to intercourse) Yes Thuy Leyva MD   calcium-vitamin D (OSCAL 500/200 D-3) 500-200 MG-UNIT per tablet Take 1 tablet by mouth 2 times daily  Yes Historical Provider, MD   Multiple Vitamin TABS Take 1 tablet by mouth daily  Yes Historical Provider, MD   orphenadrine (NORFLEX) 100 MG extended release tablet TAKE 1 TABLET BY MOUTH TWICE DAILY  Patient not taking: Reported on 11/15/2021  Historical Provider, MD   lidocaine 4 % external patch Apply patch to appropriate area and leave on for 12 hours. Remove and leave off for 12 hours before replacing with another 1 as needed for pain.   Patient not taking: Reported on 11/15/2021  Medardo Bennett PA-C        Social History     Tobacco Use    Smoking status: Former Smoker     Packs/day: 0.50     Years: 25.00     Pack years: 12.50     Types: Cigarettes     Quit date: 2021     Years since quittin.5    Smokeless tobacco: Never Used    Tobacco comment: Patient vapes   Vaping Use    Vaping Use: Every day    Substances: Flavoring    Devices: [employer] tank Substance Use Topics    Alcohol use: No     Alcohol/week: 0.0 standard drinks     Comment: last drink 2015    Drug use: Yes     Types: Marijuana Fallon Erickson)     Comment: medical marjiuana card       Review of Systems As above     Physical Exam  Vitals and nursing note reviewed. Constitutional:       General: He is not in acute distress. Appearance: Normal appearance. He is well-developed. He is not diaphoretic. Cardiovascular:      Rate and Rhythm: Normal rate and regular rhythm. Pulses: Normal pulses. Heart sounds: Normal heart sounds. No murmur heard. Pulmonary:      Effort: Pulmonary effort is normal. No respiratory distress. Breath sounds: Normal breath sounds. No wheezing or rales. Musculoskeletal:      Cervical back: Normal range of motion and neck supple. Right lower leg: No edema. Left lower leg: No edema. Comments: Back brace in place   Lymphadenopathy:      Cervical: No cervical adenopathy. Skin:     General: Skin is warm and dry. Neurological:      General: No focal deficit present. Mental Status: He is alert and oriented to person, place, and time. Mental status is at baseline. Psychiatric:         Mood and Affect: Mood normal.         Behavior: Behavior normal.         Thought Content: Thought content normal.         Judgment: Judgment normal.         ASSESSMENT/PLAN:    1. Osteopenia of multiple sites  - discussed that osteopenia plus fractures is reasonable to treat with bisphosphonate rx-  Perhaps after kyphoplasty. Prior gastric bypass, but vit D level normal with supplementation. Would benefit from smoking cessation and increased physical activity; contniue daily calcium/D supplementation. 2. Chronic pain syndrome  - has pain management for chronic abd pain (ketamine); will continue oral oxycodone at current dose until his vertebral fractures heal, then wean off. We discussed the process of weaning when the time comes.   OARRS report reviewed; is consistent with prescribed use and free of suspicious activity. 3. Closed compression fracture of body of L1 vertebra (HCC)  - agree with kyphooplasty to help pain and stabilize compression. 4. Tobacco use  - discussed quitting- the benefits and the how-to. Return in about 3 months (around 2/15/2022) for f/u pain. An  electronicsignature was used to authenticate this note.     --Raf Marley MD on 11/15/2021 at 3:23 PM

## 2021-11-16 DIAGNOSIS — S32.010A CLOSED COMPRESSION FRACTURE OF BODY OF L1 VERTEBRA (HCC): Primary | ICD-10-CM

## 2021-11-16 RX ORDER — OXYCODONE HYDROCHLORIDE AND ACETAMINOPHEN 5; 325 MG/1; MG/1
1 TABLET ORAL EVERY 6 HOURS PRN
Qty: 30 TABLET | Refills: 0 | Status: SHIPPED | OUTPATIENT
Start: 2021-11-16 | End: 2021-12-01 | Stop reason: ALTCHOICE

## 2021-11-17 ENCOUNTER — PATIENT MESSAGE (OUTPATIENT)
Dept: FAMILY MEDICINE CLINIC | Age: 51
End: 2021-11-17

## 2021-11-17 DIAGNOSIS — M94.0 COSTOCHONDRITIS: ICD-10-CM

## 2021-11-17 RX ORDER — ATORVASTATIN CALCIUM 40 MG/1
TABLET, FILM COATED ORAL
Qty: 90 TABLET | Refills: 1 | Status: SHIPPED | OUTPATIENT
Start: 2021-11-17 | End: 2022-01-10 | Stop reason: SDUPTHER

## 2021-11-17 RX ORDER — OXYCODONE HYDROCHLORIDE 5 MG/1
5 TABLET ORAL 4 TIMES DAILY
Qty: 120 TABLET | Refills: 0 | Status: SHIPPED | OUTPATIENT
Start: 2021-11-17 | End: 2022-01-18 | Stop reason: SDUPTHER

## 2021-11-17 NOTE — TELEPHONE ENCOUNTER
From: Mk Kern  To: Dr. Alfred Loss: 11/17/2021 1:22 PM EST  Subject: Percocet/Oxycodone    Hi Dr. Edwin Tijerina,    Regarding the prescription for pain medication you sent to my pharmacy, I have two questions:    1) The prescription [facility] was for Percocet. Prior to this one you had prescribed just oxycodone as I've been taking regular Tylenol as needed between doses of the narcotic. Are we changing to Percocet or should I continue with just the 5 mg of oxycodone? 2) The prescription I picked up was for Percocet 5-325. Dosage instructions are to take one table every 6 hours for 30 days, however it was written for only 30 pills. Are we changing to my requesting refills every 5 days? In the past the prescription was for the total number of pills needed to cover 30 days.     Thanks as always,  Nati Coleman Patient continues to have right-sided hip pain which extends down the right leg and into the right buttock  MRI of the lumbosacral spine is presently awaiting to be red  Her pain medication isn't helping  I have elected to prescribe Celebrex 100 mg p o  B i d   In the interim we will get her MRI  If no lesion is noted consideration of bone scan will be performed

## 2021-11-19 ENCOUNTER — TELEPHONE (OUTPATIENT)
Dept: FAMILY MEDICINE CLINIC | Age: 51
End: 2021-11-19

## 2021-11-19 NOTE — TELEPHONE ENCOUNTER
Pharmacy called wants to confirm it is ok to fill the percocet?  The pt just got the 30 tablets of the oxycodone on 11/16/21    Verified pharmacy

## 2021-11-19 NOTE — TELEPHONE ENCOUNTER
Reviewed chart  Looks like #30 of Percocet picked up 11/16  If taking QID can use this until 11/22. So would need to wait to dispense new rx until 11/22, then ok to dispense at that time. Thanks.

## 2021-11-22 ENCOUNTER — NURSE ONLY (OUTPATIENT)
Dept: CARDIOLOGY CLINIC | Age: 51
End: 2021-11-22
Payer: COMMERCIAL

## 2021-11-22 DIAGNOSIS — R55 SYNCOPE, UNSPECIFIED SYNCOPE TYPE: ICD-10-CM

## 2021-11-22 DIAGNOSIS — Z45.09 ENCOUNTER FOR LOOP RECORDER CHECK: ICD-10-CM

## 2021-11-22 DIAGNOSIS — Z86.73 HISTORY OF STROKE: ICD-10-CM

## 2021-11-22 NOTE — PROGRESS NOTES
ILR for syncope and CVA. Remote transmission received from patient's monitor at home. Remote Linq report shows 1 Symptom event w ECG illustrating NSR/ST. EP physician to review. See PACEART report under Cardiology tab. Will continue to monitor remotely.

## 2021-11-23 ENCOUNTER — TELEPHONE (OUTPATIENT)
Dept: FAMILY MEDICINE CLINIC | Age: 51
End: 2021-11-23

## 2021-11-23 PROCEDURE — 93298 REM INTERROG DEV EVAL SCRMS: CPT | Performed by: INTERNAL MEDICINE

## 2021-11-23 PROCEDURE — G2066 INTER DEVC REMOTE 30D: HCPCS | Performed by: INTERNAL MEDICINE

## 2021-11-23 NOTE — TELEPHONE ENCOUNTER
----- Message from Sharan Lindsey sent at 11/23/2021  9:31 AM EST -----  Subject: Message to Provider    QUESTIONS  Information for Provider? pt is getting a kyphoplasty procedure and needs   a pre op. Please call pt to schedule for earliest appt.   ---------------------------------------------------------------------------  --------------  CALL BACK INFO  What is the best way for the office to contact you? OK to leave message on   voicemail  Preferred Call Back Phone Number? [phone]  ---------------------------------------------------------------------------  --------------  SCRIPT ANSWERS  Relationship to Patient?  Self

## 2021-12-01 ENCOUNTER — OFFICE VISIT (OUTPATIENT)
Dept: FAMILY MEDICINE CLINIC | Age: 51
End: 2021-12-01
Payer: COMMERCIAL

## 2021-12-01 VITALS
WEIGHT: 203 LBS | BODY MASS INDEX: 28.42 KG/M2 | OXYGEN SATURATION: 98 % | HEART RATE: 60 BPM | DIASTOLIC BLOOD PRESSURE: 74 MMHG | SYSTOLIC BLOOD PRESSURE: 118 MMHG | HEIGHT: 71 IN

## 2021-12-01 DIAGNOSIS — Z72.0 TOBACCO USE: ICD-10-CM

## 2021-12-01 DIAGNOSIS — G89.4 CHRONIC PAIN SYNDROME: Chronic | ICD-10-CM

## 2021-12-01 DIAGNOSIS — Z01.818 PREOP EXAMINATION: Primary | ICD-10-CM

## 2021-12-01 DIAGNOSIS — S32.010A CLOSED COMPRESSION FRACTURE OF BODY OF L1 VERTEBRA (HCC): ICD-10-CM

## 2021-12-01 DIAGNOSIS — I82.463 ACUTE DEEP VEIN THROMBOSIS (DVT) OF CALF MUSCLE VEIN OF BOTH LOWER EXTREMITIES (HCC): ICD-10-CM

## 2021-12-01 DIAGNOSIS — K22.70 BARRETT'S ESOPHAGUS WITHOUT DYSPLASIA: ICD-10-CM

## 2021-12-01 DIAGNOSIS — G47.33 OBSTRUCTIVE SLEEP APNEA: Chronic | ICD-10-CM

## 2021-12-01 DIAGNOSIS — F10.21 HISTORY OF ALCOHOLISM (HCC): ICD-10-CM

## 2021-12-01 DIAGNOSIS — Z86.73 HISTORY OF STROKE: ICD-10-CM

## 2021-12-01 PROCEDURE — 99213 OFFICE O/P EST LOW 20 MIN: CPT | Performed by: FAMILY MEDICINE

## 2021-12-01 ASSESSMENT — ENCOUNTER SYMPTOMS
ALLERGIC/IMMUNOLOGIC NEGATIVE: 1
EYES NEGATIVE: 1
GASTROINTESTINAL NEGATIVE: 1
BACK PAIN: 1
RESPIRATORY NEGATIVE: 1

## 2021-12-01 NOTE — PROGRESS NOTES
Subjective:   PREOPERATIVE EVALUATION     Peter Em is a 49 y.o.  male who presents to the office today for a preoperative consultation at the request of surgeon Dr Chelly Harrison who plans on performing L1 kyphoplasty on December 14. Duration of problem: fall/fx 4 mo ago. Assoc sx are:persistent pain     Alleviating factors are: pain meds, bracing. This consultation is requested for thespecific conditions prompting preoperative evaluation (i.e. because of potential affect on operative risk):     Hx alcoholism: abstinent for past 7 yrs. Hx CVA 2014, TIA in March of 2021. He is now on ASA and plavix for prevention. atorva also. No new recent neurologic symptoms, except that he has had syncope recently and had a loop recorder placed. (no lightheadedness or fainting since this was placed)    OSCAR: on CPAP. He will bring this with him. Uses protinix for severe GERD. He is to take this on the am of surgery. He is on oxycodone 5 mg QID for his spine pain. He is on gabapentin for chronic abd pain, from adhesions. He also gets ketamine infusions monthly. His next infusion is scheduled for late December. Smokes lightly- 4 cigarettes daily. He has 25 years of tobacco use, however. Most of that was 1/2 ppd or less. Estimated 13-15 pack yrs. No chronic breathing issues. He had R LE DVT's 3/ 2021: treated with anticoagulation (xarelto) for 3 months. Trigger: postop after knee surgery. No new concerns. Last renal function test:   Lab Results   Component Value Date     09/30/2021    K 4.3 09/30/2021    BUN 11 09/30/2021    CREATININE 1.0 09/30/2021     Estimated Creatinine Clearance: 103 mL/min (based on SCr of 1 mg/dL).      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  Chronic pain syndrome    History of stroke: right insular cortex on June 8, 2014 (small PFO; hypergoag w/u neg,  neurology)    Gastroesophageal reflux disease with esophagitis--on daily ppi    Intractable chronic migraine without aura and with status migrainosus    Iron deficiency anemia    B12 deficiency    Malignant neoplasm of left kidney (HCC) clear cell. 8/1/18 Dr Sandra Ramirez.  History of depression    History of alcoholism ([facility] [address]    History of total knee arthroplasty, right    Hematoma of right knee region    TIA (transient ischemic attack) LUE weakness 3/21    Tobacco use    Acute deep vein thrombosis (DVT) of calf muscle vein of both lower extremities (HCC)    Closed compression fracture of body of L1 vertebra (HCC)    Osteopenia of multiple sites        Planned anesthesia is General.    The patient has the following knownanesthesia issues: none. Patient has a bleeding risk of : no recent abnormal bleeding    Patient does not have objection to receiving blood products if needed.     Past Medical History:   Diagnosis Date    Alcoholism [facility])     last drink 2015    Allergic migraine with status migrainosus     Allergic rhinitis, seasonal     Anemia     Arthritis     right knee    Vaughn's esophagus     Benign intracranial hypertension     Cancer (HCC)     renal     Carpal tunnel syndrome     Chronic pain     Depression     GERD (gastroesophageal reflux disease)     Headache(784.0)     History of blood transfusion     History of tobacco use     Quit 7/2014    Migraine     chronic for 1 year    Morbid obesity ([facility] [address]     Movement disorder     Onychomycosis     Sleep apnea, obstructive     Severe uses cpap stop bang 6    Unspecified cerebral artery occlusion with cerebral infarction 2014    slight weakness in left arm    Use of cane as ambulatory aid     Wears dentures     full set    Wears glasses      Past Surgical History:   Procedure Laterality Date    ABDOMEN SURGERY      gastric bypass    ABDOMEN SURGERY  4-7-2016    repair of recurrent incisional hernia with mesh, removal of old mesh, bilateral component separation    ABDOMINAL EXPLORATION SURGERY  1/11/16    exp lap, lysis of adhesions, small bowel resection    CARPAL TUNNEL RELEASE      bilat    DILATATION, ESOPHAGUS      ENDOSCOPY, COLON, DIAGNOSTIC      EYE SURGERY      GASTRIC BYPASS SURGERY  Jan 2009    Has lost about 200 pounds.  HERNIA REPAIR  3-    ventral    JOINT REPLACEMENT      KIDNEY REMOVAL Left 08/01/2018    KNEE ARTHROSCOPY Right 7/11/2013    Dr.Robert WaltersAdelina     KNEE ARTHROSCOPY Right 7/11/12    RIGHT KNEE ARTHROSCOPY WITH CHONDROPLASTY    KNEE SURGERY  July 2012    right, arthroscopy    KNEE SURGERY Right 2/18/2020    INCISION AND DRAINAGE RIGHT KNEE AND WOULD VAC PLACEMENT performed by Zuleyka Webster MD at . [facility] Right 2/26/2021    INCISION AND DRAINAGE OF RIGHT KNEE HEMATOMA performed by Tony Tesfaye MD at . [facility] Right 3/5/2021    INCISION AND DRAINAGE AND CLOSURE RIGHT TOTAL KNEE performed by Tony Tesfaye MD at [address] [facility]  2005    OTHER SURGICAL HISTORY Left 03/08/2016    CT biopsy ablasion left kidney    OTHER SURGICAL HISTORY  2016    small intestine 12 inch removed, 2 hernia surgeries, another surgery to drain infection from incision.      REVISION TOTAL KNEE ARTHROPLASTY Right 12/17/2019    RIGHT REVISION TIBIA TOTAL KNEE REPLACEMENT performed by Zuleyka Webster MD at [address] Right 1/22/2020    RIGHT KNEE IRRIGATION AND DEBRIDEMENT WITH POLY EXCHANGE performed by Esteban Bardales MD at [address] [facility] [address] Right 2/16/2021    RIGHT REMOVAL OF EXPLANT AND TOTAL KNEE REPLACEMENT performed by Tasia Davis MD at [address] [facility] [address]  10/15/13    RIGHT    TOTAL KNEE ARTHROPLASTY Right 8/12/2020    RIGHT ARTHROPLASY  RESECTION WITH INSERTION OF SPACER performed by Sedrick Richardson MD at [facility]  6-7-2016    UPPER GASTROINTESTINAL ENDOSCOPY  04/02/2018     Family History   Problem Relation Age of Onset    Cancer Father         Lymphoma    Arthritis Mother     Heart Disease Maternal Uncle     Heart Disease Maternal Uncle     Diabetes Maternal Uncle      Current Outpatient Medications   Medication Sig Dispense Refill    atorvastatin (LIPITOR) 40 MG tablet TAKE 1 TABLET BY MOUTH EVERYDAY AT BEDTIME 90 tablet 1    oxyCODONE (ROXICODONE) 5 MG immediate release tablet Take 1 tablet by mouth 4 times daily for 30 days. 120 tablet 0    traZODone (DESYREL) 100 MG tablet Take 2 tablets by mouth nightly 180 tablet 1    pantoprazole (PROTONIX) 40 MG tablet Take 1 tablet by mouth 2 times daily 180 tablet 1    FLUoxetine (PROZAC) 20 MG capsule Take 1 capsule by mouth daily 90 capsule 1    clopidogrel (PLAVIX) 75 MG tablet Take 1 tablet by mouth daily 90 tablet 1    gabapentin (NEURONTIN) 600 MG tablet Take 600 mg by mouth 3 times daily.  sildenafil (REVATIO) 20 MG tablet Take 4 tablets by mouth as needed (30 min prior to intercourse) 30 tablet 0    calcium-vitamin D (OSCAL 500/200 D-3) 500-200 MG-UNIT per tablet Take 1 tablet by mouth 2 times daily       Multiple Vitamin TABS Take 1 tablet by mouth daily        No current facility-administered medications for this visit. Allergies   Allergen Reactions    Nsaids Nausea Only and Other (See Comments)     Hx of Barretts esophagus      Morphine Hives and Itching     Pt gets Hives/itching.  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 Tobacco Use    Smoking status: Former Smoker     Packs/day: 0.50     Years: 25.00     Pack years: 12.50     Types: Cigarettes     Quit date: 2021     Years since quittin.5    Smokeless tobacco: Never Used    Tobacco comment: Patient vapes   Vaping Use    Vaping Use: Every day    Substances: Flavoring    Devices: Refillable tank   Substance and Sexual Activity    Alcohol use: No     Alcohol/week: 0.0 standard drinks     Comment: last drink     Drug use: Yes     Types: Marijuana Simmie Lang)     Comment: medical Beisen card    Sexual activity: Yes     Partners: Female     Comment: wife Pritesh Perry       Review of Systems   Constitutional: Negative. HENT: Negative. Eyes: Negative. Respiratory: Negative. Cardiovascular: Negative. Gastrointestinal: Negative. Endocrine: Negative. Genitourinary: Negative. Musculoskeletal: Positive for back pain. Skin: Negative. Allergic/Immunologic: Negative. Neurological: Negative. Hematological: Negative. Psychiatric/Behavioral: Negative. Objective:     Vitals:    21 1129   BP: 118/74   Site: Left Upper Arm   Position: Sitting   Cuff Size: Medium Adult   Pulse: 60   SpO2: 98%   Weight: 203 lb (92.1 kg)   Height: 5' 11\" (1.803 m)     Physical Exam  Vitals and nursing note reviewed. Constitutional:       General: He is not in acute distress. Appearance: Normal appearance. He is well-developed. He is not ill-appearing or diaphoretic. HENT:      Head: Normocephalic and atraumatic.       Right Ear: Tympanic membrane, ear canal and external ear normal.      Left Ear: Tympanic membrane, ear canal and external ear normal.      Nose: Nose normal. No congestion or rhinorrhea. Mouth/Throat:      Mouth: Mucous membranes are moist.      Pharynx: Oropharynx is clear. No oropharyngeal exudate or posterior oropharyngeal erythema. Comments: Upper/lower dentures  Eyes:      General: No scleral icterus. Right eye: No discharge. Left eye: No discharge. Conjunctiva/sclera: Conjunctivae normal.      Pupils: Pupils are equal, round, and reactive to light. Cardiovascular:      Rate and Rhythm: Normal rate and regular rhythm. Heart sounds: Normal heart sounds. No murmur heard. Pulmonary:      Effort: Pulmonary effort is normal. No respiratory distress. Breath sounds: Normal breath sounds. No wheezing or rales. Abdominal:      General: Bowel sounds are normal. There is no distension. Palpations: Abdomen is soft. There is no mass. Tenderness: There is no abdominal tenderness. There is no guarding or rebound. Hernia: No hernia is present. Musculoskeletal:         General: No swelling. Normal range of motion. Cervical back: Normal range of motion and neck supple. Right lower leg: No edema. Left lower leg: No edema. Lymphadenopathy:      Cervical: No cervical adenopathy. Skin:     General: Skin is warm and dry. Capillary Refill: Capillary refill takes less than 2 seconds. Findings: No rash. Neurological:      General: No focal deficit present. Mental Status: He is alert and oriented to person, place, and time. Mental status is at baseline. Psychiatric:         Mood and Affect: Mood normal.         Behavior: Behavior normal.         Thought Content: Thought content normal.         Judgment: Judgment normal.         Predictors of intubation difficulty:   Morbid obesity?  no   Anatomically abnormal facies? no   Short, thick neck? no   Neck range of motion: normal   Dentition: Edentulous    Cardiographics  ECG: sinus bradycardia 9/30/21    Lab Review   No visits with results within 2 Month(s) from this visit.    Latest known visit with results is:   Admission on 09/30/2021, Discharged on 10/01/2021   Component Date Value    Ventricular Rate 09/30/2021 52     Atrial Rate 09/30/2021 52     P-R Interval 09/30/2021 178     QRS Duration 09/30/2021 88     Q-T Interval 09/30/2021 408     QTc Calculation (Bazett) 09/30/2021 379     P Axis 09/30/2021 57     R Axis 09/30/2021 6     T Axis 09/30/2021 41     Diagnosis 09/30/2021 Sinus bradycardia with sinus arrhythmiaLow voltage in the limb leadsBorderline ECGWhen compared with ECG of 05-AUG-2021 06:34,No significant change was foundConfirmed by JULIANN WATTS MD ([id number]) on 10/1/2021 8:24:39 AM     WBC 09/30/2021 8.1     RBC 09/30/2021 4.16*    Hemoglobin 09/30/2021 12.3*    Hematocrit 09/30/2021 37.4*    MCV 09/30/2021 89.9     MCH 09/30/2021 29.4     MCHC 09/30/2021 32.7     RDW 09/30/2021 15.3     Platelets 68/74/5339 340     MPV 09/30/2021 7.9     Neutrophils % 09/30/2021 42.1     Lymphocytes % 09/30/2021 45.8     Monocytes % 09/30/2021 6.4     Eosinophils % 09/30/2021 4.1     Basophils % 09/30/2021 1.6     Neutrophils Absolute 09/30/2021 3.4     Lymphocytes Absolute 09/30/2021 3.7     Monocytes Absolute 09/30/2021 0.5     Eosinophils Absolute 09/30/2021 0.3     Basophils Absolute 09/30/2021 0.1     Sodium 09/30/2021 139     Potassium reflex Magnesi* 09/30/2021 4.3     Chloride 09/30/2021 106     CO2 09/30/2021 24     Anion Gap 09/30/2021 9     Glucose 09/30/2021 89     BUN 09/30/2021 11     CREATININE 09/30/2021 1.0     GFR Non- 09/30/2021 >60     GFR  09/30/2021 >60     Calcium 09/30/2021 8.9     Total Protein 09/30/2021 6.2*    Albumin 09/30/2021 3.9     Albumin/Globulin Ratio 09/30/2021 1.7     Total Bilirubin 09/30/2021 <0.2     Alkaline Phosphatase 09/30/2021 112  ALT 09/30/2021 10     AST 09/30/2021 11*    Globulin 09/30/2021 2.3     Lipase 09/30/2021 30.0     Troponin 09/30/2021 <0.01     Pro-BNP 09/30/2021 183*    Color, UA 10/01/2021 Yellow     Clarity, UA 10/01/2021 Clear     Glucose, Ur 10/01/2021 Negative     Bilirubin Urine 10/01/2021 Negative     Ketones, Urine 10/01/2021 Negative     Specific Gravity, UA 10/01/2021 1.015     Blood, Urine 10/01/2021 Negative     pH, UA 10/01/2021 6.0     Protein, UA 10/01/2021 Negative     Urobilinogen, Urine 10/01/2021 1.0     Nitrite, Urine 10/01/2021 Negative     Leukocyte Esterase, Urine 10/01/2021 Negative     Microscopic Examination 10/01/2021 Not Indicated     Urine Type 10/01/2021 Cleancatch     Urine Reflex to Culture 10/01/2021 Not Indicated           Assessment:     49 y.o. y.o. male with planned surgery as above. Difficulty with intubation is not anticipated. 1. Preop examination  - cleared for surgery    2. Closed compression fracture of body of L1 vertebra ([facility] [address]  - agree with procedure to help prevent further collapse and pain. 3. History of alcoholism ([facility] [address]  - now abstinent    4. Acute deep vein thrombosis (DVT) of calf muscle vein of both lower extremities (HCC)  - this was postop (after several complicating infections). Risk for recurrent DVT would be low based on this being a minor surgery with very little to no down time. He will also be on asa and plavix postoperatively    5. Vaughn's esophagus without dysplasia  - continoue protonix    6. Chronic pain syndrome  - continue oxy and gabapentin as prescribed    7. Tobacco use  - encouraged cessation for better healing and fewer complications. He is using a vaping device for help    8. Obstructive sleep apnea,Severe -(on cpap)  - take cpap to preop    9. History of stroke: right insular cortex on June 8, 2014 (small PFO; hypergoag w/u neg,  neurology)  - has had PFO closed now. Continues on asa plavix and statin.  He is to stop antoplatelets a week before surgery. Cardiac Risk Estimation: per the Revised Cardiac Risk Index (Circ. 100:1043, 1999), the patient's risk factors for cardiaccomplications include history of cerebrovascular disease, putting him/her in: RCI RISK CLASS II (1 risk factor, risk of major cardiac compl. appr. 1.3%)     : Ok to proceed with the procedure. Low cardiac risk. Pt.advised to stop all Rx except protonix, oxycodone and gabapentin the a.m. of surgery. Patient advised to hold blood thinning medication 7 days prior to procedure: aspirin and plavix     Prophylaxis for cardiac events with perioperative beta-blockers: not indicated    Deep vein thrombosis prophylaxis postoperatively: regimen to be chosen by surgical team if applicable. Gretchen Sanders M.D. [address] [place] [address].  [address] [place] [address]  Phone [phone]  Fax      [phone]

## 2021-12-17 ENCOUNTER — OFFICE VISIT (OUTPATIENT)
Dept: PSYCHIATRY | Age: 51
End: 2021-12-17
Payer: COMMERCIAL

## 2021-12-17 VITALS
WEIGHT: 210 LBS | OXYGEN SATURATION: 98 % | DIASTOLIC BLOOD PRESSURE: 80 MMHG | HEART RATE: 77 BPM | BODY MASS INDEX: 29.29 KG/M2 | SYSTOLIC BLOOD PRESSURE: 108 MMHG

## 2021-12-17 DIAGNOSIS — F32.A DEPRESSION, UNSPECIFIED DEPRESSION TYPE: Primary | ICD-10-CM

## 2021-12-17 PROCEDURE — 99214 OFFICE O/P EST MOD 30 MIN: CPT | Performed by: PSYCHIATRY & NEUROLOGY

## 2021-12-17 RX ORDER — FLUOXETINE HYDROCHLORIDE 20 MG/1
20 CAPSULE ORAL DAILY
Qty: 90 CAPSULE | Refills: 1 | Status: SHIPPED | OUTPATIENT
Start: 2021-12-17 | End: 2022-04-08 | Stop reason: SDUPTHER

## 2021-12-17 RX ORDER — TRAZODONE HYDROCHLORIDE 100 MG/1
200 TABLET ORAL NIGHTLY
Qty: 180 TABLET | Refills: 1 | Status: SHIPPED | OUTPATIENT
Start: 2021-12-17 | End: 2022-04-08 | Stop reason: SDUPTHER

## 2021-12-17 NOTE — PROGRESS NOTES
Psych Follow Up Progress Note    12/17/21  Susu Bear  [id number]    I have reviewed recent documentation:  Susu Bear is a 49 y.o. male  This patient  has a past medical history of Alcoholism ([facility] [address] Allergic migraine with status migrainosus, Allergic rhinitis, seasonal, Anemia, Arthritis, Vaughn's esophagus, Benign intracranial hypertension, Cancer ([facility] [address] Carpal tunnel syndrome, Chronic pain, Depression, GERD (gastroesophageal reflux disease), Headache(784.0), History of blood transfusion, History of tobacco use, Migraine, Morbid obesity ([facility] [address] Movement disorder, Onychomycosis, Sleep apnea, obstructive, Unspecified cerebral artery occlusion with cerebral infarction, Use of cane as ambulatory aid, Wears dentures, and Wears glasses. Chief Complaint   Patient presents with    Depression     Subjective/Interval Hx:  Had a kyphoplasty, now feeling much better in terms of back pain! Guts are \"behaving themselves. \"  Feels like short term memory is suffering a bit. But on the whole good! Joined a band, [employer]. \"  Son has identified as female, is now to be called \"Lis. \"  And starts hormones soon, which she's excited about. Doesn't need ambien, takes indica instead. And had a terrible experience, where he couldn't remember anything, and had taken more ambien. Location:  [facility]  Severity:  Mild at this point. Context:  As above. Modifiers:  meds somewhat helpful. Quality:  depression    Objective:  Vitals:    12/17/21 1300   BP: 108/80   Pulse: 77   SpO2: 98%   Weight: 210 lb (95.3 kg)     Body mass index is 29.29 kg/m². No results found for this or any previous visit (from the past 168 hour(s)). Current Outpatient Medications   Medication Sig Dispense Refill    atorvastatin (LIPITOR) 40 MG tablet TAKE 1 TABLET BY MOUTH EVERYDAY AT BEDTIME 90 tablet 1    oxyCODONE (ROXICODONE) 5 MG immediate release tablet Take 1 tablet by mouth 4 times daily for 30 days.  120 tablet 0    traZODone (DESYREL) 100 MG tablet Take 2 tablets by mouth nightly 180 tablet 1    pantoprazole (PROTONIX) 40 MG tablet Take 1 tablet by mouth 2 times daily 180 tablet 1    FLUoxetine (PROZAC) 20 MG capsule Take 1 capsule by mouth daily 90 capsule 1    clopidogrel (PLAVIX) 75 MG tablet Take 1 tablet by mouth daily 90 tablet 1    gabapentin (NEURONTIN) 600 MG tablet Take 600 mg by mouth 3 times daily.  sildenafil (REVATIO) 20 MG tablet Take 4 tablets by mouth as needed (30 min prior to intercourse) 30 tablet 0    calcium-vitamin D (OSCAL 500/200 D-3) 500-200 MG-UNIT per tablet Take 1 tablet by mouth 2 times daily       Multiple Vitamin TABS Take 1 tablet by mouth daily        No current facility-administered medications for this visit. ROS:  No tremor, gait nl    MSE:    A-casually dressed, good EC, pleasant and engageable  A-full  M-euthymic  S-grossly A + O  I/J-fair/fair  T-linear, goal-directed. No S/H I, no A/V H. Speech c nl r/t/v/a.    50 y.o. M c     1.  Depression NOS-Stable.  Only on prozac 20 and trazodone 200 at this point!  Which I will happily refill.  F/u in 6-9 months.       2. Tam Pineda x 7 years! Doesn't want ambien anymore, ever. I have spent >30 mins with this patient on working on coping skills and med mgt, and > 1/2 of that time was spent counseling this pt.

## 2021-12-26 ENCOUNTER — APPOINTMENT (OUTPATIENT)
Dept: CT IMAGING | Age: 51
DRG: 390 | End: 2021-12-26
Payer: COMMERCIAL

## 2021-12-26 ENCOUNTER — HOSPITAL ENCOUNTER (INPATIENT)
Age: 51
LOS: 2 days | Discharge: HOME | DRG: 390 | End: 2021-12-28
Attending: EMERGENCY MEDICINE | Admitting: INTERNAL MEDICINE
Payer: COMMERCIAL

## 2021-12-26 DIAGNOSIS — K56.609 SBO (SMALL BOWEL OBSTRUCTION) (HCC): ICD-10-CM

## 2021-12-26 DIAGNOSIS — R10.84 GENERALIZED ABDOMINAL PAIN: Primary | ICD-10-CM

## 2021-12-26 DIAGNOSIS — Z98.84 HX OF GASTRIC BYPASS: ICD-10-CM

## 2021-12-26 LAB
A/G RATIO: 1.6 (ref 1.1–2.2)
ALBUMIN SERPL-MCNC: 4.5 G/DL (ref 3.4–5)
ALP BLD-CCNC: 122 U/L (ref 40–129)
ALT SERPL-CCNC: 12 U/L (ref 10–40)
ANION GAP SERPL CALCULATED.3IONS-SCNC: 16 MMOL/L (ref 3–16)
AST SERPL-CCNC: 15 U/L (ref 15–37)
BASOPHILS ABSOLUTE: 0.1 K/UL (ref 0–0.2)
BASOPHILS RELATIVE PERCENT: 1.2 %
BILIRUB SERPL-MCNC: <0.2 MG/DL (ref 0–1)
BUN BLDV-MCNC: 10 MG/DL (ref 7–20)
CALCIUM SERPL-MCNC: 9.5 MG/DL (ref 8.3–10.6)
CHLORIDE BLD-SCNC: 103 MMOL/L (ref 99–110)
CO2: 21 MMOL/L (ref 21–32)
CREAT SERPL-MCNC: 1 MG/DL (ref 0.9–1.3)
EOSINOPHILS ABSOLUTE: 0.1 K/UL (ref 0–0.6)
EOSINOPHILS RELATIVE PERCENT: 1.1 %
GFR AFRICAN AMERICAN: >60
GFR NON-AFRICAN AMERICAN: >60
GLUCOSE BLD-MCNC: 160 MG/DL (ref 70–99)
HCT VFR BLD CALC: 42.1 % (ref 40.5–52.5)
HEMOGLOBIN: 14 G/DL (ref 13.5–17.5)
LIPASE: 21 U/L (ref 13–60)
LYMPHOCYTES ABSOLUTE: 2.2 K/UL (ref 1–5.1)
LYMPHOCYTES RELATIVE PERCENT: 20.8 %
MCH RBC QN AUTO: 30 PG (ref 26–34)
MCHC RBC AUTO-ENTMCNC: 33.3 G/DL (ref 31–36)
MCV RBC AUTO: 90 FL (ref 80–100)
MONOCYTES ABSOLUTE: 0.5 K/UL (ref 0–1.3)
MONOCYTES RELATIVE PERCENT: 4.4 %
NEUTROPHILS ABSOLUTE: 7.7 K/UL (ref 1.7–7.7)
NEUTROPHILS RELATIVE PERCENT: 72.5 %
PDW BLD-RTO: 15.1 % (ref 12.4–15.4)
PLATELET # BLD: 346 K/UL (ref 135–450)
PMV BLD AUTO: 8.2 FL (ref 5–10.5)
POTASSIUM REFLEX MAGNESIUM: 4 MMOL/L (ref 3.5–5.1)
RBC # BLD: 4.68 M/UL (ref 4.2–5.9)
SODIUM BLD-SCNC: 140 MMOL/L (ref 136–145)
TOTAL PROTEIN: 7.4 G/DL (ref 6.4–8.2)
WBC # BLD: 10.6 K/UL (ref 4–11)

## 2021-12-26 PROCEDURE — 2580000003 HC RX 258: Performed by: EMERGENCY MEDICINE

## 2021-12-26 PROCEDURE — 99284 EMERGENCY DEPT VISIT MOD MDM: CPT

## 2021-12-26 PROCEDURE — 83690 ASSAY OF LIPASE: CPT

## 2021-12-26 PROCEDURE — 96374 THER/PROPH/DIAG INJ IV PUSH: CPT

## 2021-12-26 PROCEDURE — 6360000002 HC RX W HCPCS: Performed by: EMERGENCY MEDICINE

## 2021-12-26 PROCEDURE — 74177 CT ABD & PELVIS W/CONTRAST: CPT

## 2021-12-26 PROCEDURE — 96375 TX/PRO/DX INJ NEW DRUG ADDON: CPT

## 2021-12-26 PROCEDURE — 80053 COMPREHEN METABOLIC PANEL: CPT

## 2021-12-26 PROCEDURE — 96376 TX/PRO/DX INJ SAME DRUG ADON: CPT

## 2021-12-26 PROCEDURE — 2060000000 HC ICU INTERMEDIATE R&B

## 2021-12-26 PROCEDURE — 85025 COMPLETE CBC W/AUTO DIFF WBC: CPT

## 2021-12-26 PROCEDURE — 36415 COLL VENOUS BLD VENIPUNCTURE: CPT

## 2021-12-26 PROCEDURE — 6360000004 HC RX CONTRAST MEDICATION: Performed by: EMERGENCY MEDICINE

## 2021-12-26 RX ORDER — 0.9 % SODIUM CHLORIDE 0.9 %
1000 INTRAVENOUS SOLUTION INTRAVENOUS ONCE
Status: COMPLETED | OUTPATIENT
Start: 2021-12-26 | End: 2021-12-26

## 2021-12-26 RX ORDER — ONDANSETRON 2 MG/ML
4 INJECTION INTRAMUSCULAR; INTRAVENOUS ONCE
Status: COMPLETED | OUTPATIENT
Start: 2021-12-26 | End: 2021-12-26

## 2021-12-26 RX ADMIN — HYDROMORPHONE HYDROCHLORIDE 0.5 MG: 1 INJECTION, SOLUTION INTRAMUSCULAR; INTRAVENOUS; SUBCUTANEOUS at 18:39

## 2021-12-26 RX ADMIN — HYDROMORPHONE HYDROCHLORIDE 1 MG: 1 INJECTION, SOLUTION INTRAMUSCULAR; INTRAVENOUS; SUBCUTANEOUS at 22:17

## 2021-12-26 RX ADMIN — SODIUM CHLORIDE 1000 ML: 9 INJECTION, SOLUTION INTRAVENOUS at 18:39

## 2021-12-26 RX ADMIN — ONDANSETRON 4 MG: 2 INJECTION INTRAMUSCULAR; INTRAVENOUS at 18:38

## 2021-12-26 RX ADMIN — IOPAMIDOL 100 ML: 755 INJECTION, SOLUTION INTRAVENOUS at 19:40

## 2021-12-26 RX ADMIN — HYDROMORPHONE HYDROCHLORIDE 0.5 MG: 1 INJECTION, SOLUTION INTRAMUSCULAR; INTRAVENOUS; SUBCUTANEOUS at 18:46

## 2021-12-26 ASSESSMENT — PAIN DESCRIPTION - ORIENTATION: ORIENTATION: MID

## 2021-12-26 ASSESSMENT — PAIN SCALES - GENERAL
PAINLEVEL_OUTOF10: 8
PAINLEVEL_OUTOF10: 6
PAINLEVEL_OUTOF10: 8
PAINLEVEL_OUTOF10: 10
PAINLEVEL_OUTOF10: 9

## 2021-12-26 ASSESSMENT — PAIN DESCRIPTION - PAIN TYPE: TYPE: ACUTE PAIN

## 2021-12-26 ASSESSMENT — PAIN DESCRIPTION - LOCATION: LOCATION: ABDOMEN

## 2021-12-26 ASSESSMENT — PAIN DESCRIPTION - DESCRIPTORS: DESCRIPTORS: SQUEEZING

## 2021-12-26 NOTE — ED PROVIDER NOTES
ventral    JOINT REPLACEMENT      KIDNEY REMOVAL Left 08/01/2018    KNEE ARTHROSCOPY Right 7/11/2013    Dr.Robert WaltersRa     KNEE ARTHROSCOPY Right 7/11/12    RIGHT KNEE ARTHROSCOPY WITH CHONDROPLASTY    KNEE SURGERY  July 2012    right, arthroscopy    KNEE SURGERY Right 2/18/2020    INCISION AND DRAINAGE RIGHT KNEE AND WOULD VAC PLACEMENT performed by Sedrick Richardson MD at [address] Right 2/26/2021    INCISION AND DRAINAGE OF RIGHT KNEE HEMATOMA performed by Tasia Davis MD at [address] Right 3/5/2021    INCISION AND DRAINAGE AND CLOSURE RIGHT TOTAL KNEE performed by Tasia Davis MD at [address]  2005    OTHER SURGICAL HISTORY Left 03/08/2016    CT biopsy ablasion left kidney    OTHER SURGICAL HISTORY  2016    small intestine 12 inch removed, 2 hernia surgeries, another surgery to drain infection from incision.      REVISION TOTAL KNEE ARTHROPLASTY Right 12/17/2019    RIGHT REVISION TIBIA TOTAL KNEE REPLACEMENT performed by Sedrick Richardson MD at [address] [facility] Right 1/22/2020    RIGHT KNEE IRRIGATION AND DEBRIDEMENT WITH POLY EXCHANGE performed by Esteban Bardales MD at [facility] Right 2/16/2021    RIGHT REMOVAL OF EXPLANT AND TOTAL KNEE REPLACEMENT performed by Tasia Davis MD at [address] [facility]  10/15/13    RIGHT    TOTAL KNEE ARTHROPLASTY Right 8/12/2020    RIGHT ARTHROPLASY  RESECTION WITH INSERTION OF SPACER performed by Sedrick Richardson MD at [facility]  6-7-2016    UPPER GASTROINTESTINAL ENDOSCOPY  04/02/2018     Family History   Problem Relation Age of Onset    Cancer Father         Lymphoma    Arthritis Mother     Heart Disease Maternal Uncle     Heart Disease Maternal Uncle     Diabetes Maternal Uncle      Social History     Socioeconomic History    Marital status:  Spouse name: Rosa Arteaga Number of children: 2    Years of education: Not on file    Highest education level: Not on file   Occupational History    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 Tobacco Use    Smoking status: Former Smoker     Packs/day: 0.50     Years: 25.00     Pack years: 12.50     Types: Cigarettes     Start date: 1985     Quit date: 2021     Years since quittin.6    Smokeless tobacco: Never Used    Tobacco comment: Patient vapes   Vaping Use    Vaping Use: Every day    Substances: Flavoring    Devices: Refillable tank   Substance and Sexual Activity    Alcohol use: No     Alcohol/week: 0.0 standard drinks     Comment: last drink     Drug use: Yes     Types: Marijuana Kelliher Ronit)     Comment: medical marrafa card    Sexual activity: Yes     Partners: Female     Comment: wife Abimbola Juárez   Other Topics Concern    Not on file   Social History Narrative    Not on file     Social Determinants of Health     Financial Resource Strain:     Difficulty of Paying Living Expenses: Not on file   Food Insecurity:     Worried About Running Out of Food in the Last Year: Not on file    Steven of Food in the Last Year: Not on file   Transportation Needs:     Lack of Transportation (Medical): Not on file    Lack of Transportation (Non-Medical):  Not on file   Physical Activity:     Days of Exercise per Week: Not on file    Minutes of Exercise per Session: Not on file   Stress:     Feeling of Stress : Not on file   Social Connections:     Frequency of Communication with Friends and Family: Not on file    Frequency of Social Gatherings with Friends and Family: Not on file    Attends Hindu Services: Not on file    Active Member of Clubs or Organizations: Not on file    Attends Club or Organization Meetings: Not on file    Marital Status: Not on file   Intimate Partner Violence:     Fear of Current or Ex-Partner: Not on file    Emotionally Abused: Not on file   Courtenay Spurling Only     Hx of Barretts esophagus     Nursing Notes Reviewed    ROS:  At least 10 systems reviewed and otherwise negative except as in the [address] Physical Exam:  ED Triage Vitals [12/26/21 1737]   Enc Vitals Group      BP (!) 147/89      Pulse 68      Resp 16      Temp 97.8 °F (36.6 °C)      Temp Source Oral      SpO2 99 %      Weight 207 lb 0.2 oz (93.9 kg)      Height 6' (1.829 m)      Head Circumference       Peak Flow       Pain Score       Pain Loc       Pain Edu? Excl. in [address]? My pulse oximetry interpretation is which is within the normal range    GENERAL APPEARANCE: Awake and alert. Cooperative. No acute distress. HEAD:  Atraumatic. EYES: EOM's grossly intact. ENT: Mucous membranes are moist.  No trismus. NECK:  Trachea midline. HEART: RRR. Radial pulses 2+. LUNGS: Respirations unlabored. CTAB  ABDOMEN: Soft. Nondistended abdomen. Diffuse abdominal tenderness palpation, mostly in the upper abdomen without guarding or rebound  EXTREMITIES: No acute deformities. SKIN: Warm and dry. NEUROLOGICAL: No gross facial drooping. Moves all 4 extremities spontaneously. PSYCHIATRIC: Normal mood. I have reviewed and interpreted all of the currently available lab results from this visit (if applicable):  No results found for this visit on 12/26/21. EKG: (All EKG's are interpreted by myself in the absence of a cardiologist)      MDM:  I have ordered blood work, pain and nausea medication as well as a CT of the abdomen pelvis to rule out acute pathology or obstruction. Patient will be signed out to the oncoming provider. Please refer to his note for disposition of this patient.        (Please note that portions of this note may have been completed with a voice recognition program. Efforts were made to edit the dictations but occasionally words are mis-transcribed.)    MD Makenzie Ryan MD  12/26/21 Leonid Ferrer MD  12/26/21 4133

## 2021-12-27 ENCOUNTER — APPOINTMENT (OUTPATIENT)
Dept: GENERAL RADIOLOGY | Age: 51
DRG: 390 | End: 2021-12-27
Payer: COMMERCIAL

## 2021-12-27 ENCOUNTER — NURSE ONLY (OUTPATIENT)
Dept: CARDIOLOGY CLINIC | Age: 51
End: 2021-12-27
Payer: COMMERCIAL

## 2021-12-27 DIAGNOSIS — Z86.73 HISTORY OF STROKE: ICD-10-CM

## 2021-12-27 DIAGNOSIS — G45.9 TIA (TRANSIENT ISCHEMIC ATTACK): ICD-10-CM

## 2021-12-27 DIAGNOSIS — Z45.09 ENCOUNTER FOR LOOP RECORDER CHECK: ICD-10-CM

## 2021-12-27 DIAGNOSIS — R55 SYNCOPE, UNSPECIFIED SYNCOPE TYPE: ICD-10-CM

## 2021-12-27 LAB
ANION GAP SERPL CALCULATED.3IONS-SCNC: 7 MMOL/L (ref 3–16)
BASOPHILS ABSOLUTE: 0 K/UL (ref 0–0.2)
BASOPHILS RELATIVE PERCENT: 0.5 %
BILIRUBIN URINE: ABNORMAL
BLOOD, URINE: NEGATIVE
BUN BLDV-MCNC: 7 MG/DL (ref 7–20)
CALCIUM SERPL-MCNC: 8.6 MG/DL (ref 8.3–10.6)
CHLORIDE BLD-SCNC: 107 MMOL/L (ref 99–110)
CLARITY: CLEAR
CO2: 26 MMOL/L (ref 21–32)
COLOR: YELLOW
CREAT SERPL-MCNC: 0.9 MG/DL (ref 0.9–1.3)
EOSINOPHILS ABSOLUTE: 0.2 K/UL (ref 0–0.6)
EOSINOPHILS RELATIVE PERCENT: 2.3 %
EPITHELIAL CELLS, UA: 0 /HPF (ref 0–5)
GFR AFRICAN AMERICAN: >60
GFR NON-AFRICAN AMERICAN: >60
GLUCOSE BLD-MCNC: 83 MG/DL (ref 70–99)
GLUCOSE URINE: NEGATIVE MG/DL
HCT VFR BLD CALC: 37.4 % (ref 40.5–52.5)
HEMOGLOBIN: 12 G/DL (ref 13.5–17.5)
HYALINE CASTS: 2 /LPF (ref 0–8)
KETONES, URINE: NEGATIVE MG/DL
LEUKOCYTE ESTERASE, URINE: NEGATIVE
LYMPHOCYTES ABSOLUTE: 2.3 K/UL (ref 1–5.1)
LYMPHOCYTES RELATIVE PERCENT: 28.7 %
MCH RBC QN AUTO: 29.1 PG (ref 26–34)
MCHC RBC AUTO-ENTMCNC: 32.1 G/DL (ref 31–36)
MCV RBC AUTO: 90.8 FL (ref 80–100)
MICROSCOPIC EXAMINATION: ABNORMAL
MONOCYTES ABSOLUTE: 0.5 K/UL (ref 0–1.3)
MONOCYTES RELATIVE PERCENT: 6.6 %
NEUTROPHILS ABSOLUTE: 4.9 K/UL (ref 1.7–7.7)
NEUTROPHILS RELATIVE PERCENT: 61.9 %
NITRITE, URINE: NEGATIVE
PDW BLD-RTO: 15 % (ref 12.4–15.4)
PH UA: 6 (ref 5–8)
PLATELET # BLD: 282 K/UL (ref 135–450)
PMV BLD AUTO: 8.3 FL (ref 5–10.5)
POTASSIUM REFLEX MAGNESIUM: 4.4 MMOL/L (ref 3.5–5.1)
PROTEIN UA: NEGATIVE MG/DL
RBC # BLD: 4.12 M/UL (ref 4.2–5.9)
RBC UA: 0 /HPF (ref 0–4)
SODIUM BLD-SCNC: 140 MMOL/L (ref 136–145)
SPECIFIC GRAVITY UA: >1.03 (ref 1–1.03)
URINE TYPE: ABNORMAL
UROBILINOGEN, URINE: 1 E.U./DL
WBC # BLD: 7.9 K/UL (ref 4–11)
WBC UA: 1 /HPF (ref 0–5)

## 2021-12-27 PROCEDURE — 2060000000 HC ICU INTERMEDIATE R&B

## 2021-12-27 PROCEDURE — 6370000000 HC RX 637 (ALT 250 FOR IP): Performed by: SURGERY

## 2021-12-27 PROCEDURE — 93298 REM INTERROG DEV EVAL SCRMS: CPT | Performed by: INTERNAL MEDICINE

## 2021-12-27 PROCEDURE — APPSS180 APP SPLIT SHARED TIME > 60 MINUTES: Performed by: PHYSICIAN ASSISTANT

## 2021-12-27 PROCEDURE — 85025 COMPLETE CBC W/AUTO DIFF WBC: CPT

## 2021-12-27 PROCEDURE — G2066 INTER DEVC REMOTE 30D: HCPCS | Performed by: INTERNAL MEDICINE

## 2021-12-27 PROCEDURE — 80048 BASIC METABOLIC PNL TOTAL CA: CPT

## 2021-12-27 PROCEDURE — 81001 URINALYSIS AUTO W/SCOPE: CPT

## 2021-12-27 PROCEDURE — APPNB180 APP NON BILLABLE TIME > 60 MINS: Performed by: PHYSICIAN ASSISTANT

## 2021-12-27 PROCEDURE — 94761 N-INVAS EAR/PLS OXIMETRY MLT: CPT

## 2021-12-27 PROCEDURE — 6370000000 HC RX 637 (ALT 250 FOR IP): Performed by: INTERNAL MEDICINE

## 2021-12-27 PROCEDURE — 2580000003 HC RX 258: Performed by: INTERNAL MEDICINE

## 2021-12-27 PROCEDURE — 36415 COLL VENOUS BLD VENIPUNCTURE: CPT

## 2021-12-27 PROCEDURE — 6360000002 HC RX W HCPCS: Performed by: INTERNAL MEDICINE

## 2021-12-27 PROCEDURE — 74019 RADEX ABDOMEN 2 VIEWS: CPT

## 2021-12-27 PROCEDURE — 99222 1ST HOSP IP/OBS MODERATE 55: CPT | Performed by: SURGERY

## 2021-12-27 RX ORDER — POLYETHYLENE GLYCOL 3350 17 G/17G
17 POWDER, FOR SOLUTION ORAL DAILY PRN
Status: DISCONTINUED | OUTPATIENT
Start: 2021-12-27 | End: 2021-12-27

## 2021-12-27 RX ORDER — SODIUM CHLORIDE 0.9 % (FLUSH) 0.9 %
5-40 SYRINGE (ML) INJECTION EVERY 12 HOURS SCHEDULED
Status: DISCONTINUED | OUTPATIENT
Start: 2021-12-27 | End: 2021-12-28 | Stop reason: HOSPADM

## 2021-12-27 RX ORDER — ONDANSETRON 4 MG/1
4 TABLET, ORALLY DISINTEGRATING ORAL EVERY 8 HOURS PRN
Status: DISCONTINUED | OUTPATIENT
Start: 2021-12-27 | End: 2021-12-28 | Stop reason: HOSPADM

## 2021-12-27 RX ORDER — ONDANSETRON 2 MG/ML
4 INJECTION INTRAMUSCULAR; INTRAVENOUS EVERY 6 HOURS PRN
Status: DISCONTINUED | OUTPATIENT
Start: 2021-12-27 | End: 2021-12-28 | Stop reason: HOSPADM

## 2021-12-27 RX ORDER — ACETAMINOPHEN 325 MG/1
650 TABLET ORAL EVERY 6 HOURS PRN
Status: DISCONTINUED | OUTPATIENT
Start: 2021-12-27 | End: 2021-12-28 | Stop reason: HOSPADM

## 2021-12-27 RX ORDER — SODIUM CHLORIDE 9 MG/ML
INJECTION, SOLUTION INTRAVENOUS CONTINUOUS
Status: DISCONTINUED | OUTPATIENT
Start: 2021-12-27 | End: 2021-12-28 | Stop reason: HOSPADM

## 2021-12-27 RX ORDER — POLYETHYLENE GLYCOL 3350 17 G/17G
17 POWDER, FOR SOLUTION ORAL 2 TIMES DAILY
Status: DISCONTINUED | OUTPATIENT
Start: 2021-12-27 | End: 2021-12-28 | Stop reason: HOSPADM

## 2021-12-27 RX ORDER — ACETAMINOPHEN 650 MG/1
650 SUPPOSITORY RECTAL EVERY 6 HOURS PRN
Status: DISCONTINUED | OUTPATIENT
Start: 2021-12-27 | End: 2021-12-28 | Stop reason: HOSPADM

## 2021-12-27 RX ORDER — ZOLPIDEM TARTRATE 5 MG/1
10 TABLET ORAL NIGHTLY PRN
Status: DISCONTINUED | OUTPATIENT
Start: 2021-12-27 | End: 2021-12-28 | Stop reason: HOSPADM

## 2021-12-27 RX ORDER — SODIUM CHLORIDE 9 MG/ML
25 INJECTION, SOLUTION INTRAVENOUS PRN
Status: DISCONTINUED | OUTPATIENT
Start: 2021-12-27 | End: 2021-12-28 | Stop reason: HOSPADM

## 2021-12-27 RX ORDER — TRAZODONE HYDROCHLORIDE 100 MG/1
200 TABLET ORAL NIGHTLY
Status: DISCONTINUED | OUTPATIENT
Start: 2021-12-27 | End: 2021-12-28 | Stop reason: HOSPADM

## 2021-12-27 RX ORDER — SODIUM CHLORIDE 0.9 % (FLUSH) 0.9 %
5-40 SYRINGE (ML) INJECTION PRN
Status: DISCONTINUED | OUTPATIENT
Start: 2021-12-27 | End: 2021-12-28 | Stop reason: HOSPADM

## 2021-12-27 RX ADMIN — SODIUM CHLORIDE, PRESERVATIVE FREE 10 ML: 5 INJECTION INTRAVENOUS at 21:07

## 2021-12-27 RX ADMIN — TRAZODONE HYDROCHLORIDE 200 MG: 100 TABLET ORAL at 21:06

## 2021-12-27 RX ADMIN — SODIUM CHLORIDE: 9 INJECTION, SOLUTION INTRAVENOUS at 05:03

## 2021-12-27 RX ADMIN — HYDROMORPHONE HYDROCHLORIDE 0.5 MG: 1 INJECTION, SOLUTION INTRAMUSCULAR; INTRAVENOUS; SUBCUTANEOUS at 18:08

## 2021-12-27 RX ADMIN — HYDROMORPHONE HYDROCHLORIDE 0.5 MG: 1 INJECTION, SOLUTION INTRAMUSCULAR; INTRAVENOUS; SUBCUTANEOUS at 05:49

## 2021-12-27 RX ADMIN — HYDROMORPHONE HYDROCHLORIDE 0.5 MG: 1 INJECTION, SOLUTION INTRAMUSCULAR; INTRAVENOUS; SUBCUTANEOUS at 12:03

## 2021-12-27 RX ADMIN — POLYETHYLENE GLYCOL 3350 17 G: 17 POWDER, FOR SOLUTION ORAL at 20:00

## 2021-12-27 RX ADMIN — HYDROMORPHONE HYDROCHLORIDE 0.5 MG: 1 INJECTION, SOLUTION INTRAMUSCULAR; INTRAVENOUS; SUBCUTANEOUS at 08:56

## 2021-12-27 RX ADMIN — SODIUM CHLORIDE, PRESERVATIVE FREE 10 ML: 5 INJECTION INTRAVENOUS at 08:57

## 2021-12-27 RX ADMIN — HYDROMORPHONE HYDROCHLORIDE 0.5 MG: 1 INJECTION, SOLUTION INTRAMUSCULAR; INTRAVENOUS; SUBCUTANEOUS at 21:06

## 2021-12-27 RX ADMIN — ZOLPIDEM TARTRATE 10 MG: 5 TABLET ORAL at 21:06

## 2021-12-27 RX ADMIN — HYDROMORPHONE HYDROCHLORIDE 0.5 MG: 1 INJECTION, SOLUTION INTRAMUSCULAR; INTRAVENOUS; SUBCUTANEOUS at 02:21

## 2021-12-27 RX ADMIN — HYDROMORPHONE HYDROCHLORIDE 0.5 MG: 1 INJECTION, SOLUTION INTRAMUSCULAR; INTRAVENOUS; SUBCUTANEOUS at 15:07

## 2021-12-27 ASSESSMENT — PAIN DESCRIPTION - LOCATION
LOCATION: ABDOMEN

## 2021-12-27 ASSESSMENT — PAIN DESCRIPTION - PAIN TYPE
TYPE: ACUTE PAIN

## 2021-12-27 ASSESSMENT — PAIN DESCRIPTION - ORIENTATION
ORIENTATION: MID

## 2021-12-27 ASSESSMENT — PAIN DESCRIPTION - DESCRIPTORS
DESCRIPTORS: ACHING;DISCOMFORT
DESCRIPTORS: ACHING;DISCOMFORT;SQUEEZING

## 2021-12-27 ASSESSMENT — PAIN SCALES - GENERAL
PAINLEVEL_OUTOF10: 7
PAINLEVEL_OUTOF10: 0
PAINLEVEL_OUTOF10: 7
PAINLEVEL_OUTOF10: 0
PAINLEVEL_OUTOF10: 0
PAINLEVEL_OUTOF10: 7
PAINLEVEL_OUTOF10: 0

## 2021-12-27 ASSESSMENT — PAIN DESCRIPTION - ONSET
ONSET: ON-GOING

## 2021-12-27 ASSESSMENT — PAIN DESCRIPTION - PROGRESSION
CLINICAL_PROGRESSION: NOT CHANGED

## 2021-12-27 ASSESSMENT — PAIN - FUNCTIONAL ASSESSMENT
PAIN_FUNCTIONAL_ASSESSMENT: ACTIVITIES ARE NOT PREVENTED

## 2021-12-27 ASSESSMENT — PAIN DESCRIPTION - FREQUENCY
FREQUENCY: INTERMITTENT

## 2021-12-27 NOTE — PROGRESS NOTES
Pt admitted to room 4125. Transported by wife from [facility]. Alert and oriented x4. Able to ambulate independently with a steady gait. Complains of increasing abdominal pain and slight nausea. Respirations easy and unlabored on room air. Vital signs stable. Oriented to room and call light. Notified of NPO status at this time. Will continue to monitor.

## 2021-12-27 NOTE — PROGRESS NOTES
4 Eyes Skin Assessment     NAME:  Yamilka Dawkins OF BIRTH:  1970  MEDICAL RECORD NUMBER:  [id number]    The patient is being assess for  Admission    I agree that 2 RN's have performed a thorough Head to Toe Skin Assessment on the patient. ALL assessment sites listed below have been assessed. Areas assessed by both nurses:    Head, Face, Ears, Shoulders, Back, Chest, Arms, Elbows, Hands, Sacrum. Buttock, Coccyx, Ischium and Legs. Feet and Heels        Does the Patient have a Wound?  No noted wound(s)       Andrei Prevention initiated:  NA   Wound Care Orders initiated:  NA    Pressure Injury (Stage 3,4, Unstageable, DTI, NWPT, and Complex wounds) if present place consult order under [de-identified] NA    New and Established Ostomies if present place consult order under : NA      Nurse 1 eSignature: Electronically signed by Jade Najera RN on 12/27/21 at 4:38 AM EST    **SHARE this note so that the co-signing nurse is able to place an eSignature**    Nurse 2 eSignature: {Esignature:[id number]}

## 2021-12-27 NOTE — ED NOTES
Communicated to pt that transport wouldn't been here until tomorrow at 2250. Pt said \"my wife will drive me\". I explain the doctor needs to agree to and that IV would have to come out\". PT responded \"that's ok\". EMD made aware of pt's wishes and considering that pt is stable agreed to mode of transport.    House supervisor made aware     Ashkan Malcolm RN  12/26/21 6930

## 2021-12-27 NOTE — CONSULTS
Surgery Consult Note     Lorena Chen PA-C  Pt Name: Dayanara Clements  MRN: [id number]  YOB: 1970  Date of evaluation: 12/27/2021  Primary Care Physician: Dennise Paz MD  IMPRESSIONS:   1. PSBO  2. WBC count WNL  3. Hx gastric bypass surgery  PLANS:   1. Monitor and control pain  2. Clear liquid diet  3. Miralax  4. OOB and ambulate  5. Will continue to monitor for resolution of obstruction. 6. Conservative measures needed at this time. If the obstruction seems to be worsening we will need to further discuss possible surgical exploration  SUBJECTIVE:   History of Chief Complaint:    Dayanara Clements is a 49 y.o. male who presents with abdominal pain, nausea, and dry heaving. He stated that the symptoms began yesterday AM. The pain got more intense and he went to the ER. In the ED he had a CT scan performed and this showed him to have a questionable moderate distal partial small bowel obstruction or a possible diffuse ileus which is predominately involving the small bowel. At that time he was transferred to [facility] and admitted. He had addition abdominal xrays done this AM to evaluate the PSBO and the xray's showed no acute abdominal abnormality. He stated that he does feel better today. He admits to having a small amount of flatulence but denies any BM. He denies having any other pain. Denies any CP, SOB, cough, lightheadedness, or dizziness.    Past Medical History  Reviewed  has a past medical history of Alcoholism ([address] Allergic migraine with status migrainosus, Allergic rhinitis, seasonal, Anemia, Arthritis, Vaughn's esophagus, Benign intracranial hypertension, Cancer ([address] Carpal tunnel syndrome, Chronic pain, Depression, GERD (gastroesophageal reflux disease), Headache(784.0), History of blood transfusion, History of tobacco use, Migraine, Morbid obesity ([address] Movement disorder, Onychomycosis, Sleep apnea, obstructive, Unspecified cerebral artery occlusion with cerebral infarction, Use of cane as ambulatory aid, Wears dentures, and Wears glasses. Past Surgical History  Reviewed has a past surgical history that includes Gastric bypass surgery (Jan 2009); Splenectomy; LASIK (2005); knee surgery (July 2012); Carpal tunnel release; laparotomy; Knee arthroscopy (Right, 7/11/2013); Knee arthroscopy (Right, 7/11/12); Total knee arthroplasty (10/15/13); Endoscopy, colon, diagnostic; Dilatation, esophagus; eye surgery; joint replacement; Abdominal exploration surgery (1/11/16); other surgical history (Left, 03/08/2016); hernia repair (3-); Upper gastrointestinal endoscopy (6-7-2016); other surgical history (2016); Upper gastrointestinal endoscopy (04/02/2018); Kidney removal (Left, 08/01/2018); Abdomen surgery; Abdomen surgery (4-7-2016); Revision total knee arthroplasty (Right, 12/17/2019); Revision total knee arthroplasty (Right, 1/22/2020); knee surgery (Right, 2/18/2020); Total knee arthroplasty (Right, 8/12/2020); Revision total knee arthroplasty (Right, 2/16/2021); knee surgery (Right, 2/26/2021); and knee surgery (Right, 3/5/2021). Medications  Prior to Admission medications    Medication Sig Start Date End Date Taking? Authorizing Provider   FLUoxetine (PROZAC) 20 MG capsule Take 1 capsule by mouth daily 12/17/21   Magaly Balderas MD   traZODone (DESYREL) 100 MG tablet Take 2 tablets by mouth nightly 12/17/21   Magaly Balderas MD   atorvastatin (LIPITOR) 40 MG tablet TAKE 1 TABLET BY MOUTH EVERYDAY AT BEDTIME 11/17/21   Brad Mackey MD   pantoprazole (PROTONIX) 40 MG tablet Take 1 tablet by mouth 2 times daily 9/27/21   Brad Mackey MD   clopidogrel (PLAVIX) 75 MG tablet Take 1 tablet by mouth daily 9/16/21   Sakshi Muniz MD   gabapentin (NEURONTIN) 600 MG tablet Take 600 mg by mouth 3 times daily.  8/2/21   Historical Provider, MD   sildenafil (REVATIO) 20 MG tablet Take 4 tablets by mouth as needed (30 min prior to intercourse) 1/11/21   Tomas Ramirez Debby Amezquita MD   calcium-vitamin D (OSCAL 500/200 D-3) 500-200 MG-UNIT per tablet Take 1 tablet by mouth 2 times daily     Historical Provider, MD   Multiple Vitamin TABS Take 1 tablet by mouth daily     Historical Provider, MD    Scheduled Meds:   sodium chloride flush  5-40 mL IntraVENous 2 times per day    polyethylene glycol  17 g Oral BID     Continuous Infusions:   sodium chloride      sodium chloride 100 mL/hr at 12/27/21 0503     PRN Meds:.sodium chloride flush, sodium chloride, ondansetron **OR** ondansetron, acetaminophen **OR** acetaminophen, HYDROmorphone **OR** HYDROmorphone  Allergies  is allergic to nsaids, morphine, prochlorperazine, valtrex [valacyclovir hcl], ambien [zolpidem], fentanyl, morphine and related, and tolmetin. Family History  Reviewedfamily history includes Arthritis in his mother; Cancer in his father; Diabetes in his maternal uncle; Heart Disease in his maternal uncle and maternal uncle. Social History   reports that he quit smoking about 7 months ago. His smoking use included cigarettes. He started smoking about 37 years ago. He has a 12.50 pack-year smoking history. He has never used smokeless tobacco. He reports current drug use. Drug: Marijuana Culp [place]). He reports that he does not drink alcohol. EDUCATION  Patient educated about their illness/diagnosis, stated above, and all questions answered. We discussed the importance of nutrition, medications they are taking, and healthy lifestyle.   Review of Systems:  General Denies any fever or chills  HEENT Denies any diplopia, tinnitus or vertigo  Resp Denies any shortness of breath, cough or wheezing  Cardiac Denies any chest pain, palpitations, claudication or edema  GI Denies any melena, hematochezia, hematemesis or pyrosis   Denies any frequency, urgency, hesitancy or incontinence  Heme Denies bruising or bleeding easily  Neuro Denies any focal motor or sensory deficits  OBJECTIVE:   VITALS:  height is 6' (1.829 m) and weight is 201 lb 8 oz (91.4 kg). His oral temperature is 98.2 °F (36.8 °C). His blood pressure is 106/74 and his pulse is 78. His respiration is 16 and oxygen saturation is 96%. CONSTITUTIONAL: Alert and oriented times 3, no acute distress and cooperative to examination with proper mood and affect. SKIN: Skin color, texture, turgor normal. No rashes or lesions. LYMPH: no cervical nodes, no inguinal nodes  HEENT: Head is normocephalic, atraumatic. EOMI, PERRLA. NECK: Supple, symmetrical, trachea midline, no adenopathy, thyroid symmetric, not enlarged and no tenderness, skin normal.  CHEST/LUNGS: chest symmetric with normal A/P diameter, normal respiratory rate and rhythm   CARDIOVASCULAR: Heart sounds are normal.  Regular rate and rhythm   ABDOMEN: Mildly distended. Tenderness: mild, no peritonitis  RECTAL: deferred, not clinically indicated  NEUROLOGIC: There are no focalizing motor or sensory deficits. CN II-XII are grossly intact. [place] Copping EXTREMITIES: no cyanosis, no clubbing and no edema.   LABS:     Recent Labs     12/26/21  1842 12/27/21  0700 12/27/21  1151   WBC 10.6 7.9  --    HGB 14.0 12.0*  --    HCT 42.1 37.4*  --     282  --     140  --    K 4.0 4.4  --     107  --    CO2 21 26  --    BUN 10 7  --    CREATININE 1.0 0.9  --    CALCIUM 9.5 8.6  --    AST 15  --   --    ALT 12  --   --    BILITOT <0.2  --   --    NITRU  --   --  Negative   COLORU  --   --  YELLOW     Recent Labs     12/26/21 1842   ALKPHOS 122   ALT 12   AST 15   BILITOT <0.2   LABALBU 4.5   LIPASE 21.0     CBC:   Lab Results   Component Value Date    WBC 7.9 12/27/2021    RBC 4.12 12/27/2021    RBC 4.94 01/07/2015    HGB 12.0 12/27/2021    HCT 37.4 12/27/2021    MCV 90.8 12/27/2021    MCH 29.1 12/27/2021    MCHC 32.1 12/27/2021    RDW 15.0 12/27/2021     12/27/2021    MPV 8.3 12/27/2021     CMP:    Lab Results   Component Value Date     12/27/2021    K 4.4 12/27/2021     12/27/2021    CO2 26 12/27/2021 BUN 7 12/27/2021    CREATININE 0.9 12/27/2021    GFRAA >60 12/27/2021    AGRATIO 1.6 12/26/2021    LABGLOM >60 12/27/2021    GLUCOSE 83 12/27/2021    GLUCOSE 84 01/07/2015    PROT 7.4 12/26/2021    PROT 6.7 01/07/2015    LABALBU 4.5 12/26/2021    CALCIUM 8.6 12/27/2021    BILITOT <0.2 12/26/2021    ALKPHOS 122 12/26/2021    AST 15 12/26/2021    ALT 12 12/26/2021     Urine Culture:  No components found for: CURINE  Blood Culture:  No components found for: CBLOOD, CFUNGUSBL  RADIOLOGY:     Abdominal xray's (12/27/21): No acute abdominal abnormality. CT scan abd/pelvis (12/26/21):   Previous gastric surgery with a questionable moderate distal partial small   bowel obstruction or possible diffuse ileus which is predominately involving   the small bowel.  Recommend short-term follow-up       Cholelithiasis which is unchanged.       Mild ascites which is more prominent       Previous splenectomy and left nephrectomy which is unchanged.       Small cysts in the kidney on the right which are unchanged       Mild prostatic enlargement with no pelvic mass or active inflammation.       Status post vertebroplasty of L1 which is moderately compressed and was not   seen previously. Thank you for the interesting evaluation. Further recommendations to follow. Tin Longoria [place]  [facility] [phone]  Electronically signed by Fidel Garcia PA-C on 12/27/2021 at 1:15 PM         Surgery Staff  I have examined this patient and read and agree with the note by Lissa Winn PA-C from today. Patient known from previous admissions for SBO.   Usually resolves conservatively  Acute onset of pain last evening has resolved  Passing flatus    AFVSS  Abdomen soft, NTND  F/U films with resolving SBO    SBO improved  OK for clear liquids, ADAT  Miralax BID  Potentially home tomorrow if tolerates diet advancement      Electronically signed by Jerman Wilson MD on 12/27/2021 at 4:33 PM

## 2021-12-27 NOTE — H&P
Hospital Medicine History & Physical      PCP: Jennifer Savage MD    Date of Admission: 12/26/2021    Date of Service: Pt seen/examined on 12/27/21 and Admitted to Inpatient with expected LOS greater than two midnights due to medical therapy. Chief Complaint: abdominal pain, nausea      History Of Present Illness:     49 y.o. male Ra Ledezma   -History of gastric bypass, prior laparoscopy with extensive lysis of adhesions, presents to the ED with complains of abdominal pain associate with nausea and retching. He rated his abdominal pain as 10 out of 10 intensity needing multiple dose of Dilaudid in the emergency room. CT abdomen pelvis was done which was concerning for partial small bowel obstruction. Patient was transferred to [facility] [place] for admission.     Past Medical History:          Diagnosis Date    Alcoholism [facility])     last drink 2015    Allergic migraine with status migrainosus     Allergic rhinitis, seasonal     Anemia     Arthritis     right knee    Vaughn's esophagus     Benign intracranial hypertension     Cancer (HCC)     renal     Carpal tunnel syndrome     Chronic pain     Depression     GERD (gastroesophageal reflux disease)     Headache(784.0)     History of blood transfusion     History of tobacco use     Quit 7/2014    Migraine     chronic for 1 year    Morbid obesity ([address]     Movement disorder     Onychomycosis     Sleep apnea, obstructive     Severe uses cpap stop bang 6    Unspecified cerebral artery occlusion with cerebral infarction 2014    slight weakness in left arm    Use of cane as ambulatory aid     Wears dentures     full set    Wears glasses        Past Surgical History:          Procedure Laterality Date    ABDOMEN SURGERY      gastric bypass    ABDOMEN SURGERY  4-7-2016    repair of recurrent incisional hernia with mesh, removal of old mesh, bilateral component separation    ABDOMINAL EXPLORATION SURGERY  1/11/16    exp lap, lysis of adhesions, small bowel resection    CARPAL TUNNEL RELEASE      bilat    DILATATION, ESOPHAGUS      ENDOSCOPY, COLON, DIAGNOSTIC      EYE SURGERY      GASTRIC BYPASS SURGERY  Jan 2009    Has lost about 200 pounds.  HERNIA REPAIR  3-    ventral    JOINT REPLACEMENT      KIDNEY REMOVAL Left 08/01/2018    KNEE ARTHROSCOPY Right 7/11/2013    Dr.Robert Walters-Adelina     KNEE ARTHROSCOPY Right 7/11/12    RIGHT KNEE ARTHROSCOPY WITH CHONDROPLASTY    KNEE SURGERY  July 2012    right, arthroscopy    KNEE SURGERY Right 2/18/2020    INCISION AND DRAINAGE RIGHT KNEE AND WOULD VAC PLACEMENT performed by Brenda Mace MD at . [address] Right 2/26/2021    INCISION AND DRAINAGE OF RIGHT KNEE HEMATOMA performed by Addis Suárez MD at . [address] Right 3/5/2021    INCISION AND DRAINAGE AND CLOSURE RIGHT TOTAL KNEE performed by Addis Suárez MD at [address]  2005    OTHER SURGICAL HISTORY Left 03/08/2016    CT biopsy ablasion left kidney    OTHER SURGICAL HISTORY  2016    small intestine 12 inch removed, 2 hernia surgeries, another surgery to drain infection from incision.      REVISION TOTAL KNEE ARTHROPLASTY Right 12/17/2019    RIGHT REVISION TIBIA TOTAL KNEE REPLACEMENT performed by Brenda Mace MD at [facility] Right 1/22/2020    RIGHT KNEE IRRIGATION AND DEBRIDEMENT WITH POLY EXCHANGE performed by Brandi Ramirez MD at [facility] Right 2/16/2021    RIGHT REMOVAL OF EXPLANT AND TOTAL KNEE REPLACEMENT [facility]  10/15/13    RIGHT    TOTAL KNEE ARTHROPLASTY Right 8/12/2020    RIGHT ARTHROPLASY  RESECTION WITH INSERTION OF SPACER performed by Brenda Mace MD at [facility]  6-7-2016    UPPER GASTROINTESTINAL ENDOSCOPY  04/02/2018       Medications Prior to Admission:      Prior to Admission medications    Medication Sig Start Date End Date Taking? Authorizing Provider   FLUoxetine (PROZAC) 20 MG capsule Take 1 capsule by mouth daily 12/17/21   Dixie Tenorio MD   traZODone (DESYREL) 100 MG tablet Take 2 tablets by mouth nightly 12/17/21   Dixie Tenorio MD   atorvastatin (LIPITOR) 40 MG tablet TAKE 1 TABLET BY MOUTH EVERYDAY AT BEDTIME 11/17/21   Keshav Macdonald MD   pantoprazole (PROTONIX) 40 MG tablet Take 1 tablet by mouth 2 times daily 9/27/21   Keshav Macdonald MD   clopidogrel (PLAVIX) 75 MG tablet Take 1 tablet by mouth daily 9/16/21   Milagro Valle MD   gabapentin (NEURONTIN) 600 MG tablet Take 600 mg by mouth 3 times daily. 8/2/21   Historical Provider, MD   sildenafil (REVATIO) 20 MG tablet Take 4 tablets by mouth as needed (30 min prior to intercourse) 1/11/21   Keshav Macdonald MD   calcium-vitamin D (OSCAL 500/200 D-3) 500-200 MG-UNIT per tablet Take 1 tablet by mouth 2 times daily     Historical Provider, MD   Multiple Vitamin TABS Take 1 tablet by mouth daily     Historical Provider, MD       Allergies:  Nsaids, Morphine, Prochlorperazine, Valtrex [valacyclovir hcl], Ambien [zolpidem], Fentanyl, Morphine and related, and Tolmetin    Social History:      The patient currently lives at home    TOBACCO:   reports that he quit smoking about 7 months ago. His smoking use included cigarettes. He started smoking about 37 years ago. He has a 12.50 pack-year smoking history. He has never used smokeless tobacco.  ETOH:   reports no history of alcohol use. Family History:    Reviewed        Problem Relation Age of Onset    Cancer Father         Lymphoma    Arthritis Mother     Heart Disease Maternal Uncle     Heart Disease Maternal Uncle     Diabetes Maternal Uncle        REVIEW OF SYSTEMS:   Pertinent positives as noted in the HPI. All other systems reviewed and negative.     PHYSICAL EXAM PERFORMED:    /75   Pulse 63 Temp 98.7 °F (37.1 °C) (Oral)   Resp 17   Ht 6' (1.829 m)   Wt 207 lb 0.2 oz (93.9 kg)   SpO2 96%   BMI 28.08 kg/m²     General appearance:  No apparent distress, appears stated age and cooperative. HEENT:  Normal cephalic, atraumatic without obvious deformity. Pupils equal, round, and reactive to light. Extra ocular muscles intact. Conjunctivae/corneas clear. Neck: Supple, with full range of motion. No jugular venous distention. Trachea midline. Respiratory:  Normal respiratory effort. Clear to auscultation, bilaterally without Rales/Wheezes/Rhonchi. Cardiovascular:  Regular rate and rhythm with normal S1/S2 without murmurs, rubs or gallops. Abdomen: Prior abdominal surgical scar present, periumbilical tenderness present, no guarding, bowel sounds positive  Musculoskeletal:  No clubbing, cyanosis or edema bilaterally. Full range of motion without deformity. Skin: Skin color, texture, turgor normal.  No rashes or lesions. Neurologic:  Neurovascularly intact without any focal sensory/motor deficits. Cranial nerves: II-XII intact, grossly non-focal.  Psychiatric:  Alert and oriented, thought content appropriate, normal insight  Capillary Refill: Brisk,< 3 seconds   Peripheral Pulses: +2 palpable, equal bilaterally       Labs:     Recent Labs     12/26/21 1842   WBC 10.6   HGB 14.0   HCT 42.1        Recent Labs     12/26/21 1842      K 4.0      CO2 21   BUN 10   CREATININE 1.0   CALCIUM 9.5     Recent Labs     12/26/21  1842   AST 15   ALT 12   BILITOT <0.2   ALKPHOS 122     No results for input(s): INR in the last 72 hours. No results for input(s): Curlie Lat in the last 72 hours.     Urinalysis:      Lab Results   Component Value Date    NITRU Negative 10/01/2021    WBCUA 3 03/26/2021    RBCUA 8 03/26/2021    BLOODU Negative 10/01/2021    SPECGRAV 1.015 10/01/2021    GLUCOSEU Negative 10/01/2021    GLUCOSEU NEGATIVE 02/07/2011       Radiology:     CXR: I have reviewed the CXR with the following interpretation: n/a  EKG:  I have reviewed the EKG with the following interpretation: n/a    CT ABDOMEN PELVIS W IV CONTRAST Additional Contrast? None   Final Result   Previous gastric surgery with a questionable moderate distal partial small   bowel obstruction or possible diffuse ileus which is predominately involving   the small bowel. Recommend short-term follow-up      Cholelithiasis which is unchanged. Mild ascites which is more prominent      Previous splenectomy and left nephrectomy which is unchanged. Small cysts in the kidney on the right which are unchanged      Mild prostatic enlargement with no pelvic mass or active inflammation. Status post vertebroplasty of L1 which is moderately compressed and was not   seen previously. ASSESSMENT:    Active Hospital Problems    Diagnosis Date Noted    Small bowel obstruction ([facility] [K56.609] 12/26/2021     Small bowel obstruction, will give bowel rest, start IV fluids 100/h, monitor vitals, general surgery is been consulted for evaluation recommendations. We will keep patient n.p.o., can have some ice chips for comfort  Dilaudid for pain control he is allergic to morphine    GERD/history of Vaughn's esophagus, continue Protonix 40 mg twice daily    Nicotine dependence, counseled smoking cessation    Chronic pain, continue gabapentin 600 3 times daily once tolerating p.o. intake    Anxiety/depression, continue Prozac/trazodone once tolerating p.o. intake    DVT Prophylaxis: SCDs  Diet: Diet NPO Exceptions are: Ice Chips  Code Status: Full Code    PT/OT Eval Status: N/A    Dispo - Admit as inpatient. I anticipate hospitalization spanning more than two midnights for investigation and treatment of the above medically necessary diagnoses. Yuki Fink MD    Thank you Emy Antoine MD for the opportunity to be involved in this patient's care.  If you have any questions or concerns please feel free to contact me at [phone].

## 2021-12-27 NOTE — CARE COORDINATION
Case Management Assessment           Initial Evaluation                Date / Time of Evaluation: 12/27/2021 2:31 PM                 Assessment Completed by: Dinah Atkins RN    Patient Name: Iram Aquino     YOB: 1970  Diagnosis: Small bowel obstruction ([facility] [I87.751]  SBO (small bowel obstruction) ([facility] [K56.609]  Generalized abdominal pain [R10.84]  Hx of gastric bypass [Z98.84]     Date / Time: 12/26/2021  5:33 PM    Patient Admission Status: Inpatient    Current PCP: Lizette Marques MD    Chart Reviewed: Yes  Patient/ Family Interviewed: Yes    Emergency Contacts:  Extended Emergency Contact Information  Primary Emergency Contact: [facility]  Address: [address] Phone: [phone]  Mobile Phone: [phone]  Relation: Spouse  Secondary Emergency Contact: [address] Phone: [phone]  Relation: Parent    Advance Directives:   Code Status: Full Code    Financial  Payor: Aleida Parikh / Plan: Carlie Gong PPO / Product Type: *No Product type* /     Pharmacy    [id number] [address] [facility] [phone] Ally Hopkins [phone]  [facility] [address]  [address] [place] [address]  Phone: [phone] Fax: [address] [phone] Ally Hopkins [phone] [address] [address]  Phone: [phone] Fax: [phone]      Potential assistance Purchasing Medications: Potential Assistance Purchasing Medications: No    ADLS Independent with all aspects of personal care. Pt has not been driving because he had multiple episodes of blacking out (has a loop recorder). He is self employed.      Support Systems: Spouse/Significant Other,Children,Family Members    HOUSING  Home Environment: multi-level house  Steps: [address]    Plans to RETURN to current housing: Yes    Igor Alford  Currently ACTIVE with Home Health Care: No      Durable Medical Equipment  none    Home Oxygen and Respiratory Equipment  Has HOME OXYGEN prior to admission: No    Transportation PLAN for discharge: family     The Patient and/or patient representative Robbi Carl and his family were provided with a choice of provider and agrees with the discharge plan Yes    Freedom of choice list was provided with basic dialogue that supports the patient's individualized plan of care/goals and shares the quality data associated with the providers.  Yes    Care Transition patient: Yes    Samantha Barber RN  Case Management  [phone]

## 2021-12-27 NOTE — PROGRESS NOTES
Patient is alert and oriented x4, UAL, call light within reach,  AM meds complete, patient tolerated well. VSS and WDL. No s/s of distress, no further needs noted at this time.  Electronically signed by Yuan Estrada RN on 12/27/2021 at 10:38 AM

## 2021-12-27 NOTE — PROGRESS NOTES
ILR for syncope and CVA. Remote transmission received from patient's monitor at home. Remote Linq report shows no arrhythmias. EP physician to review. See PACEART report under Cardiology tab. Will continue to monitor remotely.

## 2021-12-27 NOTE — ED NOTES
PT leaving with wife to [facility]  Instructions were given  Advice to keep NPO until told otherwise     Itzel De La Torre RN  12/26/21 9642

## 2021-12-27 NOTE — PLAN OF CARE
Problem: Pain:  Goal: Pain level will decrease  Description: Pain level will decrease  12/27/2021 1037 by Warnell Eisenmenger, RN  Outcome: Ongoing   Pain /discomfort being managed with PRN analgesics per MD orders. Patient able to express presence and absence of pain and rate pain appropriately using numerical scale.      Problem: Infection:  Goal: Will remain free from infection  Description: Will remain free from infection  Outcome: Ongoing     Problem: Safety:  Goal: Free from accidental physical injury  Description: Free from accidental physical injury  Outcome: Ongoing     Problem: Daily Care:  Goal: Daily care needs are met  Description: Daily care needs are met  Outcome: Ongoing     Problem: Skin Integrity:  Goal: Skin integrity will stabilize  Description: Skin integrity will stabilize  Outcome: Ongoing     Problem: Discharge Planning:  Goal: Patients continuum of care needs are met  Description: Patients continuum of care needs are met  Outcome: Ongoing

## 2021-12-27 NOTE — PLAN OF CARE
Problem: Pain:  Description: Pain management should include both nonpharmacologic and pharmacologic interventions. Goal: Pain level will decrease  Description: Pain level will decrease  Outcome: Ongoing  Goal: Control of acute pain  Description: Control of acute pain  Outcome: Ongoing  Goal: Control of chronic pain  Description: Control of chronic pain  Outcome: Ongoing  Pain level assessed. Pt able to rate pain on a scale of 0-10. Administered PRN analgesics per order. Reassessed for effectiveness. Will continue to monitor.

## 2021-12-27 NOTE — ED NOTES
Spoke with Aayush Schuster from transfer center  Room 5293  Arranging BLS ride for near future     [facility]  12/26/21 5224

## 2021-12-27 NOTE — ED PROVIDER NOTES
[address] [place] [address] ENCOUNTER      Pt Name: Tiffanie Schulte  MRN: [id number]  [place] 1970  Date of evaluation: 12/26/2021  Provider: Peterson Rebollar, [address] [place] [address]  Chief Complaint   Patient presents with    Abdominal Pain     chronic abd pain, exacerbated at 1130 today         This patient was turned over to me at 1900 by Dr. Vince Cranker. They were examined by me and I agree with the history and physical examination of Dr. Vince Cranker. After my evaluation of this patient, and review of the labs and imaging studies, they will be transferred to [facility] [place] for admission. Past Medical History:   Diagnosis Date    Alcoholism [facility])     last drink 2015    Allergic migraine with status migrainosus     Allergic rhinitis, seasonal     Anemia     Arthritis     right knee    Vaughn's esophagus     Benign intracranial hypertension     Cancer (HCC)     renal     Carpal tunnel syndrome     Chronic pain     Depression     GERD (gastroesophageal reflux disease)     Headache(784.0)     History of blood transfusion     History of tobacco use     Quit 7/2014    Migraine     chronic for 1 year    Morbid obesity ([address]     Movement disorder     Onychomycosis     Sleep apnea, obstructive     Severe uses cpap stop bang 6    Unspecified cerebral artery occlusion with cerebral infarction 2014    slight weakness in left arm    Use of cane as ambulatory aid     Wears dentures     full set    Wears glasses        Vitals:    12/26/21 2029   BP: 125/77   Pulse: 67   Resp: 17   Temp:    SpO2: 99%       MDM:  Tiffanie Schulte is a 49 y.o. male who presents with abdominal pain and nausea with occasional vomiting that has been present for few days. Has a history of similar symptoms with small bowel obstructions and complications of his gastric bypass surgery. Physical exam reveals diffuse abdominal tenderness. There is guarding but no rebound rigidity.   CT scan of his abdomen shows small bowel obstruction. He has had multiple small bowel obstructions since his gastric bypass surgery. He frequently request pain medicines. White count was normal.  The remainder of his lab work was negative. Therefore, patient was transferred to [facility] [place] [facility] [place] for admission and further evaluation treatment. I spoke to Dr. Maicol Walter who agreed to accept the patient. Vitals:    12/26/21 2029   BP: 125/77   Pulse: 67   Resp: 17   Temp:    SpO2: 99%       Labs Reviewed   COMPREHENSIVE METABOLIC PANEL W/ REFLEX TO MG FOR LOW K - Abnormal; Notable for the following components:       Result Value    Glucose 160 (*)     All other components within normal limits    Narrative:     Performed at:  [facility]  [address] [place], [place]   Phone [phone]   CBC WITH AUTO DIFFERENTIAL    Narrative:     Performed at:  [facility]  [address] [place], [place]   Phone [phone]   LIPASE    Narrative:     Performed at:  [facility]  [address] [place], [place]   Phone [phone]       CT ABDOMEN PELVIS W IV CONTRAST Additional Contrast? None   Final Result   Previous gastric surgery with a questionable moderate distal partial small   bowel obstruction or possible diffuse ileus which is predominately involving   the small bowel. Recommend short-term follow-up      Cholelithiasis which is unchanged. Mild ascites which is more prominent      Previous splenectomy and left nephrectomy which is unchanged. Small cysts in the kidney on the right which are unchanged      Mild prostatic enlargement with no pelvic mass or active inflammation. Status post vertebroplasty of L1 which is moderately compressed and was not   seen previously. Pertinent Labs & Imaging studies reviewed.  (See chart for details)    Patient remained stable in the ED. The patient's blood pressure was found to be elevated according to CMS/Medicare and the Affordable Care Act/[facility] criteria. Elevated blood pressure could occur because of pain or anxiety or other reasons and does not mean that they need to have their blood pressure treated or medications otherwise adjusted. However, this could also be a sign that they will need to have their blood pressure treated or medications changed. The patient was instructed to follow up closely with their personal physician to have their blood pressure rechecked. The patient was instructed to take a list of recent blood pressure readings to their next visit with their personal physician. See discharge instructions for specific medications, discharge information, and treatments. They were verbally instructed to return to emergency if any problems. Medications   HYDROmorphone (DILAUDID) injection 0.5 mg (0.5 mg IntraVENous Not Given 12/26/21 1848)   ondansetron (ZOFRAN) injection 4 mg (4 mg IntraVENous Given 12/26/21 1838)   0.9 % sodium chloride bolus (0 mLs IntraVENous Stopped 12/26/21 1940)   iopamidol (ISOVUE-370) 76 % injection 100 mL (100 mLs IntraVENous Given 12/26/21 1940)   HYDROmorphone (DILAUDID) injection 0.5 mg (0.5 mg IntraVENous Given 12/26/21 1846)   HYDROmorphone (DILAUDID) injection 1 mg (1 mg IntraVENous Given 12/26/21 2217)       New Prescriptions    No medications on file       I reviewed old records     (This chart has been completed using [address]. Although attempts have been made to ensure accuracy, words and/or phrases may not be transcribed as intended.)    Patient requested pain medicines at the time of his exam.    IMPRESSION(S):  1. Generalized abdominal pain    2. SBO (small bowel obstruction) ([address]    3.  Hx of gastric bypass               Antonella Nova,   12/26/21 2228

## 2021-12-28 VITALS
OXYGEN SATURATION: 96 % | RESPIRATION RATE: 16 BRPM | DIASTOLIC BLOOD PRESSURE: 68 MMHG | BODY MASS INDEX: 27.35 KG/M2 | SYSTOLIC BLOOD PRESSURE: 110 MMHG | WEIGHT: 201.94 LBS | HEIGHT: 72 IN | HEART RATE: 60 BPM | TEMPERATURE: 98 F

## 2021-12-28 PROCEDURE — APPNB45 APP NON BILLABLE 31-45 MINUTES: Performed by: PHYSICIAN ASSISTANT

## 2021-12-28 PROCEDURE — APPSS45 APP SPLIT SHARED TIME 31-45 MINUTES: Performed by: PHYSICIAN ASSISTANT

## 2021-12-28 PROCEDURE — 6360000002 HC RX W HCPCS: Performed by: INTERNAL MEDICINE

## 2021-12-28 PROCEDURE — 94760 N-INVAS EAR/PLS OXIMETRY 1: CPT

## 2021-12-28 PROCEDURE — 99232 SBSQ HOSP IP/OBS MODERATE 35: CPT | Performed by: SURGERY

## 2021-12-28 PROCEDURE — 2580000003 HC RX 258: Performed by: INTERNAL MEDICINE

## 2021-12-28 RX ORDER — ZOLPIDEM TARTRATE 10 MG/1
TABLET ORAL NIGHTLY PRN
COMMUNITY
End: 2022-01-25

## 2021-12-28 RX ADMIN — SODIUM CHLORIDE: 9 INJECTION, SOLUTION INTRAVENOUS at 03:01

## 2021-12-28 RX ADMIN — HYDROMORPHONE HYDROCHLORIDE 0.5 MG: 1 INJECTION, SOLUTION INTRAMUSCULAR; INTRAVENOUS; SUBCUTANEOUS at 12:43

## 2021-12-28 RX ADMIN — HYDROMORPHONE HYDROCHLORIDE 0.5 MG: 1 INJECTION, SOLUTION INTRAMUSCULAR; INTRAVENOUS; SUBCUTANEOUS at 09:35

## 2021-12-28 RX ADMIN — HYDROMORPHONE HYDROCHLORIDE 0.5 MG: 1 INJECTION, SOLUTION INTRAMUSCULAR; INTRAVENOUS; SUBCUTANEOUS at 00:05

## 2021-12-28 RX ADMIN — HYDROMORPHONE HYDROCHLORIDE 0.5 MG: 1 INJECTION, SOLUTION INTRAMUSCULAR; INTRAVENOUS; SUBCUTANEOUS at 03:03

## 2021-12-28 RX ADMIN — HYDROMORPHONE HYDROCHLORIDE 0.5 MG: 1 INJECTION, SOLUTION INTRAMUSCULAR; INTRAVENOUS; SUBCUTANEOUS at 06:20

## 2021-12-28 ASSESSMENT — PAIN SCALES - GENERAL
PAINLEVEL_OUTOF10: 0
PAINLEVEL_OUTOF10: 7
PAINLEVEL_OUTOF10: 7
PAINLEVEL_OUTOF10: 0
PAINLEVEL_OUTOF10: 7
PAINLEVEL_OUTOF10: 6
PAINLEVEL_OUTOF10: 7

## 2021-12-28 ASSESSMENT — PAIN DESCRIPTION - FREQUENCY: FREQUENCY: INTERMITTENT

## 2021-12-28 ASSESSMENT — PAIN DESCRIPTION - PAIN TYPE
TYPE: ACUTE PAIN
TYPE: ACUTE PAIN

## 2021-12-28 ASSESSMENT — PAIN DESCRIPTION - PROGRESSION: CLINICAL_PROGRESSION: NOT CHANGED

## 2021-12-28 ASSESSMENT — PAIN DESCRIPTION - ORIENTATION: ORIENTATION: MID

## 2021-12-28 ASSESSMENT — PAIN DESCRIPTION - LOCATION: LOCATION: ABDOMEN

## 2021-12-28 ASSESSMENT — PAIN DESCRIPTION - ONSET: ONSET: ON-GOING

## 2021-12-28 ASSESSMENT — PAIN DESCRIPTION - DESCRIPTORS: DESCRIPTORS: ACHING;DISCOMFORT;SQUEEZING

## 2021-12-28 ASSESSMENT — PAIN - FUNCTIONAL ASSESSMENT: PAIN_FUNCTIONAL_ASSESSMENT: ACTIVITIES ARE NOT PREVENTED

## 2021-12-28 NOTE — CARE COORDINATION
CASE MANAGEMENT DISCHARGE SUMMARY:No Needs    DISCHARGE DATE: 12/28/21    DISCHARGED TO HOME     TRANSPORTATION: Family                Electronically signed by SHAWANDA Cuellar on 12/28/2021 at 11:54 AM

## 2021-12-28 NOTE — PROGRESS NOTES
Data- discharge order received, pt verbalized agreement to discharge, disposition to previous residence, no needs for HHC/DME. Action- discharge instructions prepared and given to patient, pt verbalized understanding. Medication information packet given r/t NEW and/or CHANGED prescriptions emphasizing name/purpose/side effects, pt verbalized understanding. Discharge instruction summary: Diet- regular, Activity- as tolerated, Primary Care Physician as follows: Trenna Hammans, [phone] f/u appointment, immunizations reviewed and verified,   Response- Pt belongings gathered, IV removed. Disposition is home (no HHC/DME needs), transported with staff, taken to [facility] ambulatory w/ staff, no complications.  Electronically signed by José Miguel Hawkins RN on 12/28/2021 at 2:06 PM

## 2021-12-28 NOTE — PROGRESS NOTES
General and Vascular Surgery                                                           Daily Progress Note                                                             Jimi Macias PA-C     Pt Name: Raquel Ochoa [place] Record Number: [id number]  Date of Birth 1970   Today's Date: 12/28/2021      ASSESSMENT/PLAN  1. PSBO resolved  2. WBC count WNL  3. Abdomen nontender and nondistended   4. Denies any nausea or emesis  5. +flatulence and BM  6. Tolerating clear liquid diet. Will advance diet to regular  7. OK to D/C from a general surgery standpoint  EDUCATION  Patient educated about their illness/diagnosis, stated above, and all questions answered. We discussed the importance of nutrition, medications they are taking, and healthy lifestyle. Rosanne Vallejo has improved from yesterday. Pain is well controlled. He has no nausea and no vomiting. He has passed flatus and has had a bowel movement. He is tolerating thin liquids. Current activity is ad agustina  OBJECTIVE  VITALS:  height is 6' (1.829 m) and weight is 201 lb 15.1 oz (91.6 kg). His oral temperature is 98 °F (36.7 °C). His blood pressure is 110/68 and his pulse is 60. His respiration is 16 and oxygen saturation is 96%. VITALS:  /68   Pulse 60   Temp 98 °F (36.7 °C) (Oral)   Resp 16   Ht 6' (1.829 m)   Wt 201 lb 15.1 oz (91.6 kg)   SpO2 96%   BMI 27.39 kg/m²   GENERAL: alert, no distress  ABDOMEN: soft, non-tender and non-distended  I/O last 3 completed shifts:   In: 240 [P.O.:240]  Out: -   I/O this shift:  In: 360 [P.O.:360]  Out: -     LABS  Recent Labs     12/26/21  1842 12/26/21  1842 12/27/21  0700 12/27/21  1151   WBC 10.6   < > 7.9  --    HGB 14.0   < > 12.0*  --    HCT 42.1   < > 37.4*  --       < > 282  --       < > 140  --    K 4.0   < > 4.4  --       < > 107  --    CO2 21   < > 26  --    BUN 10   < > 7  --    CREATININE 1.0   < > 0.9  --    CALCIUM 9.5   < > 8.6  --    AST 15  --   --   --    ALT 12  --   --   --    BILITOT <0.2  --   --   --    NITRU  --   --   --  Negative   COLORU  --   --   --  YELLOW    < > = values in this interval not displayed. CBC:   Lab Results   Component Value Date    WBC 7.9 12/27/2021    RBC 4.12 12/27/2021    RBC 4.94 01/07/2015    HGB 12.0 12/27/2021    HCT 37.4 12/27/2021    MCV 90.8 12/27/2021    MCH 29.1 12/27/2021    MCHC 32.1 12/27/2021    RDW 15.0 12/27/2021     12/27/2021    MPV 8.3 12/27/2021     CMP:    Lab Results   Component Value Date     12/27/2021    K 4.4 12/27/2021     12/27/2021    CO2 26 12/27/2021    BUN 7 12/27/2021    CREATININE 0.9 12/27/2021    GFRAA >60 12/27/2021    AGRATIO 1.6 12/26/2021    LABGLOM >60 12/27/2021    GLUCOSE 83 12/27/2021    GLUCOSE 84 01/07/2015    PROT 7.4 12/26/2021    PROT 6.7 01/07/2015    LABALBU 4.5 12/26/2021    CALCIUM 8.6 12/27/2021    BILITOT <0.2 12/26/2021    ALKPHOS 122 12/26/2021    AST 15 12/26/2021    ALT 12 12/26/2021         Lorena Chen PA-C  Electronically signed 12/28/2021 at 12:51 PM         Surgery Staff  I have examined this patient and read and agree with the note by Rafaela Flanagan PA-C from today. Symptoms resolved. +BMs    Abdomen soft< NTND    Resolved PSBO  Diet as tolerated  OK to D/C home from surgical standpoint.   Recommend daily miralax       Electronically signed by Juan Carlos Ram MD on 12/28/2021 at 3:32 PM

## 2021-12-28 NOTE — PLAN OF CARE
Problem: Pain:  Goal: Pain level will decrease  Description: Pain level will decrease  12/28/2021 1046 by Andrés Taylor RN  Outcome: Ongoing  Pain /discomfort being managed with PRN analgesics per MD orders. Patient able to express presence and absence of pain and rate pain appropriately using numerical scale.      Problem: Infection:  Goal: Will remain free from infection  Description: Will remain free from infection  12/28/2021 1046 by Andrés Taylor RN  Outcome: Ongoing     Problem: Safety:  Goal: Free from accidental physical injury  Description: Free from accidental physical injury  12/28/2021 1046 by Andrés Taylor RN  Outcome: Ongoing     Problem: Daily Care:  Goal: Daily care needs are met  Description: Daily care needs are met  12/28/2021 1046 by Andrés Taylor RN  Outcome: Ongoing     Problem: Skin Integrity:  Goal: Skin integrity will stabilize  Description: Skin integrity will stabilize  12/28/2021 1046 by Andrés Taylor RN  Outcome: Ongoing     Problem: Discharge Planning:  Goal: Patients continuum of care needs are met  Description: Patients continuum of care needs are met  12/28/2021 1046 by Andrés Taylor RN  Outcome: Ongoing

## 2021-12-28 NOTE — PROGRESS NOTES
Pt. Seen and examined earlier today by our group. I have reviewed the History and Physical, and agree with the current plan of care. Will add his sleep meds back in as he is now on a clear fluid diet. We will continue to follow this patient during this hospitalization.     Carter Giles MD, MD

## 2021-12-28 NOTE — PROGRESS NOTES
Patient is alert and oriented x4, UAL, call light within reach. VSS and WDL. No s/s of distress, no further needs noted at this time.  Electronically signed by Wes Lopez RN on 12/28/2021 at 10:47 AM

## 2021-12-29 ENCOUNTER — CARE COORDINATION (OUTPATIENT)
Dept: CASE MANAGEMENT | Age: 51
End: 2021-12-29

## 2021-12-29 NOTE — CARE COORDINATION
[facility] Transitions Initial Follow Up Call    Call within 2 business days of discharge: Yes    Patient: Abi Kwan Patient : 1970   MRN: [id number]  Reason for Admission: SBO  Discharge Date: 21 RARS: Readmission Risk Score: 10.6 ( )      Last Discharge [facility]       Complaint Diagnosis Description Type Department Provider    21 Abdominal Pain Generalized abdominal pain . .. ED to Hosp-Admission (Discharged) (ADMITTED) Ynes Ozuna MD; Rodney Curtis. .. Spoke with: no one    Facility: [address] [place] [address] Transitions 24 Hour Call    Do you have all of your prescriptions and are they filled?: Yes  Post Acute Services: [address] (Comment: [address])  Care Transitions Interventions         Follow Up  Future Appointments   Date Time Provider Timothy Beal   2022 12:00 PM SCHEDULE, [facility]   2022 10:00 AM Meghann Alvarez MD [facility]   3/7/2022 12:00 PM SCHEDULE, [facility]   3/18/2022 11:40 AM Tiffanie R Kehrt, APRN - CNP [facility]   2022 12:15 PM SCHEDULE, [facility]   2022  7:45 AM SCHEDULE, [facility]   2022  9:00 AM SCHEDULE, [facility]   2022  8:15 AM SCHEDULE, [facility]   2022  7:45 AM SCHEDULE, [facility]   10/3/2022  7:45 AM SCHEDULE, [facility]   2022  7:45 AM SCHEDULE, [facility]   2022  7:45 AM SCHEDULE, [facility]       First initial follow up outreach call attempt, no answer. CTN left  with contact information and request for return call. CTN will continue with outreach call attempts.     RONNIE Duran, RN   Care Transition Nurse  Mobile: [phone]

## 2021-12-30 ENCOUNTER — CARE COORDINATION (OUTPATIENT)
Dept: CASE MANAGEMENT | Age: 51
End: 2021-12-30

## 2021-12-30 NOTE — CARE COORDINATION
[facility] Transitions Initial Follow Up Call    Call within 2 business days of discharge: Yes    Patient: Esme Mireles Patient : 1970   MRN: [id number]  Reason for Admission: SBO  Discharge Date: 21 RARS: Readmission Risk Score: 10.6 ( )      Last Discharge [facility]       Complaint Diagnosis Description Type Department Provider    21 Abdominal Pain Generalized abdominal pain . .. ED to Hosp-Admission (Discharged) (ADMITTED) Debby Smith MD; Natalie Florez. .. Spoke with: Cameron Aparicio [place] [address].: [facility]    Non-face-to-face services provided:  Obtained and reviewed discharge summary and/or continuity of care documents     Transitions of Care Initial Call    Was this an external facility discharge? No     Challenges to be reviewed by the provider   Additional needs identified to be addressed with provider: No  none             Method of communication with provider : none      Was this a readmission? No  Patient stated reason for admission: SBO  Patients top risk factors for readmission: medical condition-.    Care Transition Nurse (CTN) contacted the patient by telephone to perform post hospital discharge assessment. Verified name and  with patient as identifiers. Provided introduction to self, and explanation of the CTN role. CTN reviewed discharge instructions, medical action plan and red flags with patient who verbalized understanding. Patient given an opportunity to ask questions and does not have any further questions or concerns at this time. Were discharge instructions available to patient? Yes. Reviewed appropriate site of care based on symptoms and resources available to patient including: PCP. The patient agrees to contact the PCP office for questions related to their healthcare. Reviewed and educated patient on any new and changed medications related to discharge diagnosis. CTN provided contact information.    Plan for next call: symptom management-.  self management-.  follow up appointment-.          Care Transitions 24 Hour Call    Do you have any ongoing symptoms?: No  Do you have a copy of your discharge instructions?: Yes  Do you have all of your prescriptions and are they filled?: Yes  Have you been contacted by a [address]?: No  Have you scheduled your follow up appointment?: No  Were you discharged with any Home Care or Post Acute Services: No  Post Acute Services: [address] (Comment: [facility] [place] [address] [phone])  Do you feel like you have everything you need to keep you well at home?: Yes  Care Transitions Interventions         Follow Up  Future Appointments   Date Time Provider Timothy Beal   1/31/2022 12:00 PM SCHEDULE, [facility] [place] [facility]   2/4/2022 10:00 AM MD KATIE [facility]   3/7/2022 12:00 PM SCHEDULE, [facility]   3/18/2022 11:40 AM Tiffanie R Kehrt, APRN - CNP  SLEEP MED [facility]   4/11/2022 12:15 PM SCHEDULE, [facility]   5/16/2022  7:45 AM SCHEDULE, [facility]   6/20/2022  9:00 AM SCHEDULE, [facility]   7/25/2022  8:15 AM SCHEDULE, [facility]   8/29/2022  7:45 AM SCHEDULE, [facility]   10/3/2022  7:45 AM SCHEDULE, [facility]   11/7/2022  7:45 AM SCHEDULE, [facility]   12/12/2022  7:45 AM SCHEDULE, [facility]       Pt reports to be doing well today. States he is able to eat and drink with the absence of nausea vomiting or abdominal pain. Denies any distention in his belly. Is passing gas and having BMs since discharge. Pt denies any fever or chills. States he has no mobility issues. Pt denies any changes to medications.  Pt has not made his follow up appointment with PCP but plans on doing that this afternoon and does not wish to have help with this. No questions or concerns for CTN at this time. Will continue to follow.     RONNIE Lott, RN   Care Transition Nurse  Mobile: [phone]

## 2022-01-10 RX ORDER — ATORVASTATIN CALCIUM 40 MG/1
TABLET, FILM COATED ORAL
Qty: 90 TABLET | Refills: 1 | Status: SHIPPED | OUTPATIENT
Start: 2022-01-10 | End: 2022-09-21

## 2022-01-12 ENCOUNTER — CARE COORDINATION (OUTPATIENT)
Dept: CASE MANAGEMENT | Age: 52
End: 2022-01-12

## 2022-01-12 NOTE — CARE COORDINATION
Dorethia Nares, LPN CC  Care Transitions  [phone]    Care Transitions Subsequent and Final Call    Subsequent and Final Calls  Care Transitions Interventions  Other Interventions:            Follow Up  Future Appointments   Date Time Provider Timothy Beal   1/31/2022 12:00 PM SCHEDULE, [facility]   2/4/2022 10:00 AM Kip Thakkar MD [facility]   3/7/2022 12:00 PM SCHEDULE, [facility]   3/18/2022 11:40 AM Tiffanie R Kehrt, APRN - CNP [facility]   4/11/2022 12:15 PM SCHEDULE, [facility]   5/16/2022  7:45 AM SCHEDULE, [facility]   6/20/2022  9:00 AM SCHEDULE, [facility]   7/25/2022  8:15 AM SCHEDULE, [facility]   8/29/2022  7:45 AM SCHEDULE, [facility]   10/3/2022  7:45 AM SCHEDULE, [facility]   11/7/2022  7:45 AM SCHEDULE, [facility]   12/12/2022  7:45 AM SCHEDULE, [facility]       Zohreh Sanchez LPN

## 2022-01-17 ENCOUNTER — APPOINTMENT (OUTPATIENT)
Dept: CT IMAGING | Age: 52
End: 2022-01-17
Payer: COMMERCIAL

## 2022-01-17 ENCOUNTER — HOSPITAL ENCOUNTER (EMERGENCY)
Age: 52
Discharge: HOME | End: 2022-01-18
Attending: EMERGENCY MEDICINE
Payer: COMMERCIAL

## 2022-01-17 ENCOUNTER — PATIENT MESSAGE (OUTPATIENT)
Dept: FAMILY MEDICINE CLINIC | Age: 52
End: 2022-01-17

## 2022-01-17 DIAGNOSIS — M94.0 COSTOCHONDRITIS: ICD-10-CM

## 2022-01-17 DIAGNOSIS — R10.84 GENERALIZED ABDOMINAL PAIN: Primary | ICD-10-CM

## 2022-01-17 DIAGNOSIS — R11.2 NAUSEA AND VOMITING, INTRACTABILITY OF VOMITING NOT SPECIFIED, UNSPECIFIED VOMITING TYPE: ICD-10-CM

## 2022-01-17 LAB
A/G RATIO: 1.7 (ref 1.1–2.2)
ALBUMIN SERPL-MCNC: 4 G/DL (ref 3.4–5)
ALP BLD-CCNC: 98 U/L (ref 40–129)
ALT SERPL-CCNC: 10 U/L (ref 10–40)
ANION GAP SERPL CALCULATED.3IONS-SCNC: 11 MMOL/L (ref 3–16)
AST SERPL-CCNC: 12 U/L (ref 15–37)
BASOPHILS ABSOLUTE: 0.1 K/UL (ref 0–0.2)
BASOPHILS RELATIVE PERCENT: 1.3 %
BILIRUB SERPL-MCNC: 0.3 MG/DL (ref 0–1)
BILIRUBIN URINE: NEGATIVE
BLOOD, URINE: NEGATIVE
BUN BLDV-MCNC: 11 MG/DL (ref 7–20)
CALCIUM SERPL-MCNC: 9.1 MG/DL (ref 8.3–10.6)
CHLORIDE BLD-SCNC: 103 MMOL/L (ref 99–110)
CLARITY: CLEAR
CO2: 26 MMOL/L (ref 21–32)
COLOR: YELLOW
CREAT SERPL-MCNC: 1.1 MG/DL (ref 0.9–1.3)
EOSINOPHILS ABSOLUTE: 0.3 K/UL (ref 0–0.6)
EOSINOPHILS RELATIVE PERCENT: 4.4 %
GFR AFRICAN AMERICAN: >60
GFR NON-AFRICAN AMERICAN: >60
GLUCOSE BLD-MCNC: 104 MG/DL (ref 70–99)
GLUCOSE URINE: NEGATIVE MG/DL
HCT VFR BLD CALC: 40 % (ref 40.5–52.5)
HEMOGLOBIN: 13.1 G/DL (ref 13.5–17.5)
KETONES, URINE: NEGATIVE MG/DL
LEUKOCYTE ESTERASE, URINE: NEGATIVE
LIPASE: 48 U/L (ref 13–60)
LYMPHOCYTES ABSOLUTE: 3.2 K/UL (ref 1–5.1)
LYMPHOCYTES RELATIVE PERCENT: 42.2 %
MCH RBC QN AUTO: 29.6 PG (ref 26–34)
MCHC RBC AUTO-ENTMCNC: 32.8 G/DL (ref 31–36)
MCV RBC AUTO: 90.1 FL (ref 80–100)
MICROSCOPIC EXAMINATION: NORMAL
MONOCYTES ABSOLUTE: 0.5 K/UL (ref 0–1.3)
MONOCYTES RELATIVE PERCENT: 6.6 %
NEUTROPHILS ABSOLUTE: 3.5 K/UL (ref 1.7–7.7)
NEUTROPHILS RELATIVE PERCENT: 45.5 %
NITRITE, URINE: NEGATIVE
PDW BLD-RTO: 15.3 % (ref 12.4–15.4)
PH UA: 6 (ref 5–8)
PLATELET # BLD: 312 K/UL (ref 135–450)
PMV BLD AUTO: 7.6 FL (ref 5–10.5)
POTASSIUM REFLEX MAGNESIUM: 4.1 MMOL/L (ref 3.5–5.1)
PROTEIN UA: NEGATIVE MG/DL
RBC # BLD: 4.44 M/UL (ref 4.2–5.9)
SODIUM BLD-SCNC: 140 MMOL/L (ref 136–145)
SPECIFIC GRAVITY UA: 1.02 (ref 1–1.03)
TOTAL PROTEIN: 6.4 G/DL (ref 6.4–8.2)
URINE REFLEX TO CULTURE: NORMAL
URINE TYPE: NORMAL
UROBILINOGEN, URINE: 1 E.U./DL
WBC # BLD: 7.7 K/UL (ref 4–11)

## 2022-01-17 PROCEDURE — 85025 COMPLETE CBC W/AUTO DIFF WBC: CPT

## 2022-01-17 PROCEDURE — 99282 EMERGENCY DEPT VISIT SF MDM: CPT

## 2022-01-17 PROCEDURE — 83690 ASSAY OF LIPASE: CPT

## 2022-01-17 PROCEDURE — 36415 COLL VENOUS BLD VENIPUNCTURE: CPT

## 2022-01-17 PROCEDURE — 81003 URINALYSIS AUTO W/O SCOPE: CPT

## 2022-01-17 PROCEDURE — 80053 COMPREHEN METABOLIC PANEL: CPT

## 2022-01-17 RX ORDER — ONDANSETRON 2 MG/ML
4 INJECTION INTRAMUSCULAR; INTRAVENOUS ONCE
Status: COMPLETED | OUTPATIENT
Start: 2022-01-17 | End: 2022-01-18

## 2022-01-17 RX ORDER — 0.9 % SODIUM CHLORIDE 0.9 %
1000 INTRAVENOUS SOLUTION INTRAVENOUS ONCE
Status: COMPLETED | OUTPATIENT
Start: 2022-01-17 | End: 2022-01-18

## 2022-01-17 ASSESSMENT — PAIN DESCRIPTION - FREQUENCY: FREQUENCY: CONTINUOUS

## 2022-01-17 ASSESSMENT — PAIN DESCRIPTION - DESCRIPTORS: DESCRIPTORS: SHARP

## 2022-01-17 ASSESSMENT — PAIN DESCRIPTION - PAIN TYPE: TYPE: ACUTE PAIN

## 2022-01-17 ASSESSMENT — PAIN DESCRIPTION - ORIENTATION: ORIENTATION: MID

## 2022-01-17 ASSESSMENT — PAIN SCALES - GENERAL: PAINLEVEL_OUTOF10: 8

## 2022-01-17 ASSESSMENT — PAIN SCALES - WONG BAKER: WONGBAKER_NUMERICALRESPONSE: 8

## 2022-01-17 ASSESSMENT — PAIN DESCRIPTION - LOCATION: LOCATION: ABDOMEN

## 2022-01-17 NOTE — Clinical Note
Win Shepherd was seen and treated in our emergency department on 1/17/2022. He may return to work on 01/21/2022. No restrictions     If you have any questions or concerns, please don't hesitate to call.       Bill Sim RN

## 2022-01-17 NOTE — TELEPHONE ENCOUNTER
From: Brandi Najera  To: Dr. Harmon Staff: 1/17/2022 1:23 PM EST  Subject: Oxycodone/Kyphoplasty     Hi Dr. Darin Russ,  Now that [facility] had the kyphoplasty done to repair the fractured vertebrae, I would like to discontinue taking the oxycodone as the procedure seems to have worked and my back is no longer hurting me with any significant amount of pain. How do I go about getting off of the oxycodone at this point? I am down to my last couple of pills, so Im not sure how to proceed at this point.

## 2022-01-17 NOTE — DISCHARGE SUMMARY
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  Chronic pain syndrome    History of stroke: right insular cortex on 2014 (small PFO; hypergoag w/u neg,  neurology)    Gastroesophageal reflux disease with esophagitis--on daily ppi    Intractable chronic migraine without aura and with status migrainosus    Iron deficiency anemia    B12 deficiency    Malignant neoplasm of left kidney (HCC) clear cell. 18 Dr Kathy Masterson.     History of depression    History of alcoholism ([facility] [address]    History of total knee arthroplasty, right    Hematoma of right knee region    TIA (transient ischemic attack) LUE weakness 3/21    Tobacco use    Acute deep vein thrombosis (DVT) of calf muscle vein of both lower extremities (HCC)    Closed compression fracture of body of L1 vertebra (HCC)    Osteopenia of multiple sites    Small bowel obstruction (HCC)       Admission Condition: fair    Discharged Condition: stable    Discharge Exam:  VITALS:  /68   Pulse 60   Temp 98 °F (36.7 °C) (Oral)   Resp 16   Ht 6' (1.829 m)   Wt 201 lb 15.1 oz (91.6 kg)   SpO2 96%   BMI 27.39 kg/m²   CONSTITUTIONAL:  awake, alert, cooperative, no apparent distress, and appears stated age  EYES:  Lids and lashes normal, PERRL, EOMI, sclera clear, conjunctiva normal  ENT:  NC/AT, MMM    NECK:  Supple, symmetrical, trachea midline, no adenopathy  HEMATOLOGIC/LYMPHATICS:  no cervical, supraclavicular or axillary lymphadenopathy  LUNGS:  clear to auscultation bilaterally, No increased work of breathing, good air exchange, no crackles or wheezing  CARDIOVASCULAR:  Regular rate and rhythm, normal S1 and S2, no S3 or S4, and no significant murmurs, rubs or gallops noted. Normal apical impulse,   ABDOMEN:  Normal active bowel sounds, soft, non-tender, non-distended, no masses palpated, no organomegally  MUSCULOSKELETAL:  Full range of motion noted. NEUROLOGIC:  Awake, alert, oriented to name, place and time. Cranial nerves II-XII are grossly intact. SKIN:  normal skin color, texture, turgor for age. Hospital Course:     49 y.o. male Santos Sheikh   -History of gastric bypass, prior laparoscopy with extensive lysis of adhesions, presents to the ED with complains of abdominal pain associate with nausea and retching. He rated his abdominal pain as 10 out of 10 intensity needing multiple dose of Dilaudid in the emergency room. CT abdomen pelvis was done which was concerning for partial small bowel obstruction. Patient [facility] [place] for admission.     Small bowel obstruction, resolved with conservative treatment/bowel rest. General Surgery consulted      GERD/history of Vaughn's esophagus, continue Protonix 40 mg twice daily     Nicotine dependence, counseled smoking cessation     Chronic pain, continue gabapentin 600 3 times daily once tolerating p.o. intake     Anxiety/depression, continue Prozac/trazodone once tolerating p.o. intake        Consults: general surgery    Imaging Studies:  CT ABDOMEN PELVIS W IV CONTRAST Additional Contrast? None    Result Date: 12/26/2021  EXAMINATION: CT OF THE ABDOMEN AND PELVIS WITH CONTRAST 12/26/2021 6:40 pm TECHNIQUE: CT of the abdomen and pelvis was performed with the administration of intravenous contrast. Multiplanar reformatted images are provided for review.  Dose modulation, iterative reconstruction, and/or weight based adjustment of the mA/kV was utilized to reduce the radiation dose to as low as reasonably achievable. COMPARISON: 06/22/2021 HISTORY: ORDERING SYSTEM PROVIDED HISTORY: h/o sbo, abd pain, nausea TECHNOLOGIST PROVIDED HISTORY: Additional Contrast?->None Reason for exam:->h/o sbo, abd pain, nausea Decision Support Exception - unselect if not a suspected or confirmed emergency medical condition->Emergency Medical Condition (MA) Reason for Exam: Mid abdominal pain, nausea, and dry heaves Relevant Medical/Surgical History: gastric by-pass, left kidney removed for cancer, multiple SBO, hernias FINDINGS: Lower Chest: There is hazy left basilar opacity which is less prominent Organs: The liver is unremarkable. The gallbladder is mildly dilated with calcifications posteriorly which is unchanged. There is no wall thickening or edema. The bile ducts and pancreas are normal.  The spleen and left kidney have been removed which is unchanged. The right kidney is normal size with small hypodense cysts along the upper which are unchanged. No hydronephrosis or stones are seen. GI/Bowel: There are surgical clips and sutures along the EG junction extending along the proximal stomach and small bowel which is unchanged. There are multiple loops mild-to-moderately dilated small bowel along the upper abdomen which extends inferiorly into lower pelvis to the area of the terminal ileum which is not well delineated. There is mild feces throughout the colon which is minimally dilated throughout. The appendix is normal with no pericecal inflammation and no bowel wall thickening Pelvis: The bladder is unremarkable. The prostate gland top-normal.  There is a calcification along the cul-de-sac posteriorly which is unchanged. There is mild free fluid in the pelvis and right lower quadrant.  Peritoneum/Retroperitoneum: There is mild free fluid around the liver extending inferiorly along the paracolic region which is more apparent. The aorta is unremarkable with no aneurysm or dissection. No retroperitoneal mass or adenopathy is seen. Bones/Soft Tissues: The bones are intact. No aggressive osseous lesion is seen. There is a moderately severe wedge compression fracture along the superior endplate of L1 which was not seen previously. There is moderate sclerosis along the superior endplate with mild bulging of the posterior cortex which is more apparent. There is methylmethacrylate in the vertebra consistent with the previous vertebral plasty. Previous gastric surgery with a questionable moderate distal partial small bowel obstruction or possible diffuse ileus which is predominately involving the small bowel. Recommend short-term follow-up Cholelithiasis which is unchanged. Mild ascites which is more prominent Previous splenectomy and left nephrectomy which is unchanged. Small cysts in the kidney on the right which are unchanged Mild prostatic enlargement with no pelvic mass or active inflammation. Status post vertebroplasty of L1 which is moderately compressed and was not seen previously. XR ABDOMEN (2 VIEWS)    Result Date: 12/27/2021  EXAMINATION: TWO XRAY VIEWS OF THE ABDOMEN 12/27/2021 11:30 am COMPARISON: CT abdomen and pelvis performed 12/26/2021. HISTORY: ORDERING SYSTEM PROVIDED HISTORY: f/u SBO TECHNOLOGIST PROVIDED HISTORY: Reason for exam:->f/u SBO Reason for Exam: f/u SBO FINDINGS: There is a nonobstructive bowel gas pattern. There is no intraperitoneal free air. There are no suspicious calcifications. There is no acute osseous abnormality. There is evidence of prior kyphoplasty at L1. The surrounding soft tissues are unremarkable. No acute abdominal abnormality.        Other Significant Diagnostic Studies: As described above    Treatments: As described above    Disposition: home    Discharge Medications:       Medication List      CONTINUE taking these medications    clopidogrel 75 MG tablet  Commonly known as: PLAVIX  Take 1 tablet by mouth daily     FLUoxetine 20 MG capsule  Commonly known as: PROZAC  Take 1 capsule by mouth daily     gabapentin 600 MG tablet  Commonly known as: NEURONTIN     Multiple Vitamin Tabs     Oscal 500/200 D-3 500-200 MG-UNIT per tablet  Generic drug: calcium-vitamin D     pantoprazole 40 MG tablet  Commonly known as: PROTONIX  Take 1 tablet by mouth 2 times daily     sildenafil 20 MG tablet  Commonly known as: REVATIO  Take 4 tablets by mouth as needed (30 min prior to intercourse)     traZODone 100 MG tablet  Commonly known as: DESYREL  Take 2 tablets by mouth nightly     zolpidem 10 MG tablet  Commonly known as: AMBIEN            35 Minutes spent on patient evaluation, counseling and discharge planning.      Signed:  Esperanza Gaston MD, MD  1/17/2022, 6:46 AM

## 2022-01-18 ENCOUNTER — APPOINTMENT (OUTPATIENT)
Dept: CT IMAGING | Age: 52
End: 2022-01-18
Payer: COMMERCIAL

## 2022-01-18 VITALS
WEIGHT: 213.19 LBS | HEIGHT: 72 IN | BODY MASS INDEX: 28.88 KG/M2 | HEART RATE: 62 BPM | SYSTOLIC BLOOD PRESSURE: 114 MMHG | RESPIRATION RATE: 10 BRPM | TEMPERATURE: 97.9 F | DIASTOLIC BLOOD PRESSURE: 70 MMHG | OXYGEN SATURATION: 94 %

## 2022-01-18 LAB
LACTIC ACID: 0.7 MMOL/L (ref 0.4–2)
SARS-COV-2, NAAT: NOT DETECTED

## 2022-01-18 PROCEDURE — 36415 COLL VENOUS BLD VENIPUNCTURE: CPT

## 2022-01-18 PROCEDURE — 2580000003 HC RX 258: Performed by: PHYSICIAN ASSISTANT

## 2022-01-18 PROCEDURE — 74177 CT ABD & PELVIS W/CONTRAST: CPT

## 2022-01-18 PROCEDURE — 6360000004 HC RX CONTRAST MEDICATION: Performed by: PHYSICIAN ASSISTANT

## 2022-01-18 PROCEDURE — 87635 SARS-COV-2 COVID-19 AMP PRB: CPT

## 2022-01-18 PROCEDURE — 96374 THER/PROPH/DIAG INJ IV PUSH: CPT

## 2022-01-18 PROCEDURE — 96376 TX/PRO/DX INJ SAME DRUG ADON: CPT

## 2022-01-18 PROCEDURE — 83605 ASSAY OF LACTIC ACID: CPT

## 2022-01-18 PROCEDURE — 96375 TX/PRO/DX INJ NEW DRUG ADDON: CPT

## 2022-01-18 PROCEDURE — 6360000002 HC RX W HCPCS: Performed by: PHYSICIAN ASSISTANT

## 2022-01-18 RX ORDER — TRAMADOL HYDROCHLORIDE 50 MG/1
50 TABLET ORAL EVERY 4 HOURS PRN
Qty: 18 TABLET | Refills: 0 | Status: SHIPPED | OUTPATIENT
Start: 2022-01-18 | End: 2022-01-21

## 2022-01-18 RX ORDER — OXYCODONE HYDROCHLORIDE 5 MG/1
5 TABLET ORAL 4 TIMES DAILY
Qty: 36 TABLET | Refills: 0 | Status: SHIPPED | OUTPATIENT
Start: 2022-01-18 | End: 2022-01-25

## 2022-01-18 RX ADMIN — ONDANSETRON 4 MG: 2 INJECTION INTRAMUSCULAR; INTRAVENOUS at 00:19

## 2022-01-18 RX ADMIN — HYDROMORPHONE HYDROCHLORIDE 1 MG: 1 INJECTION, SOLUTION INTRAMUSCULAR; INTRAVENOUS; SUBCUTANEOUS at 00:19

## 2022-01-18 RX ADMIN — HYDROMORPHONE HYDROCHLORIDE 0.5 MG: 1 INJECTION, SOLUTION INTRAMUSCULAR; INTRAVENOUS; SUBCUTANEOUS at 01:42

## 2022-01-18 RX ADMIN — SODIUM CHLORIDE 1000 ML: 9 INJECTION, SOLUTION INTRAVENOUS at 01:00

## 2022-01-18 RX ADMIN — IOPAMIDOL 75 ML: 755 INJECTION, SOLUTION INTRAVENOUS at 00:37

## 2022-01-18 ASSESSMENT — PAIN SCALES - GENERAL
PAINLEVEL_OUTOF10: 9
PAINLEVEL_OUTOF10: 8

## 2022-01-18 ASSESSMENT — ENCOUNTER SYMPTOMS
DIARRHEA: 0
COUGH: 0
VOMITING: 1
ABDOMINAL PAIN: 1
NAUSEA: 1
SHORTNESS OF BREATH: 0
CONSTIPATION: 0

## 2022-01-18 NOTE — ED NOTES
Provider order placed for patient's discharge. Provider reviewed decision to discharge with the patient. Discharge paperwork and any prescriptions were reviewed with the patient. Patient verbalized understanding of discharge education and any prescriptions and has no further questions or further needs at this time. Patient left with all personal belongings and was stable upon departure. Patient thanked for choosing [facility] ([facility]) and informed to return should any need arise.        William Hernandez RN  01/18/22 6415

## 2022-01-18 NOTE — ED PROVIDER NOTES
[address] [facility]        Pt Name: Adriel Hwang  MRN: [id number]  [place] 1970  Date of evaluation: 1/17/2022  Provider: JEMAL Alberts  PCP: Emily Soliz MD  Note Started: 12:33 AM EST        I have seen and evaluated this patient with my supervising physician No att. providers found. CHIEF COMPLAINT       Chief Complaint   Patient presents with    Emesis     x 2 after eating, hx of bowel obstruction    Abdominal Pain       HISTORY OF PRESENT ILLNESS   (Location, Timing/Onset, Context/Setting, Quality, Duration, Modifying Factors, Severity, Associated Signs and Symptoms)  Note limiting factors. Chief Complaint: Abdominal pain, vomiting     Adriel Hwang is a 49 y.o. male who presents to the emergency department today with complaints of abdominal pain, vomiting. He reports that he ate dinner around 6:00 this evening. Around 10:00 he was walking, when he began to feel very nauseated, and vomited. He states he vomited up undigested food. He states that he did also develop severe lower abdominal pain. This feels very similar to when he had a bowel obstruction just a few weeks ago. He has not passed any flatus since his symptoms started, his last bowel movement was this morning. He has not had anything to eat since around 6:00 this evening. He does have an extensive history of previous abdominal surgeries. He has no further complaints at this time    Nursing Notes were all reviewed and agreed with or any disagreements were addressed in the HPI. REVIEW OF SYSTEMS    (2-9 systems for level 4, 10 or more for level 5)     Review of Systems   Constitutional: Negative for chills and fever. Respiratory: Negative for cough and shortness of breath. Cardiovascular: Negative for chest pain and palpitations. Gastrointestinal: Positive for abdominal pain, nausea and vomiting.  Negative for constipation and diarrhea. Genitourinary: Negative for dysuria, frequency and urgency. Positives and Pertinent negatives as per HPI. Except as noted above in the ROS, all other systems were reviewed and negative. PAST MEDICAL HISTORY     Past Medical History:   Diagnosis Date    Alcoholism [facility])     last drink 2015    Allergic migraine with status migrainosus     Allergic rhinitis, seasonal     Anemia     Arthritis     right knee    Vaughn's esophagus     Benign intracranial hypertension     Cancer (HCC)     renal     Carpal tunnel syndrome     Chronic pain     Depression     GERD (gastroesophageal reflux disease)     Headache(784.0)     History of blood transfusion     History of tobacco use     Quit 7/2014    Migraine     chronic for 1 year    Morbid obesity ([address]     Movement disorder     Onychomycosis     Sleep apnea, obstructive     Severe uses cpap stop bang 6    Unspecified cerebral artery occlusion with cerebral infarction 2014    slight weakness in left arm    Use of cane as ambulatory aid     Wears dentures     full set    Wears glasses          SURGICAL HISTORY     Past Surgical History:   Procedure Laterality Date    ABDOMEN SURGERY      gastric bypass    ABDOMEN SURGERY  4-7-2016    repair of recurrent incisional hernia with mesh, removal of old mesh, bilateral component separation    ABDOMINAL EXPLORATION SURGERY  1/11/16    exp lap, lysis of adhesions, small bowel resection    CARPAL TUNNEL RELEASE      bilat    DILATATION, ESOPHAGUS      ENDOSCOPY, COLON, DIAGNOSTIC      EYE SURGERY      GASTRIC BYPASS SURGERY  Jan 2009    Has lost about 200 pounds.     HERNIA REPAIR  3-    ventral    JOINT REPLACEMENT      KIDNEY REMOVAL Left 08/01/2018    KNEE ARTHROSCOPY Right 7/11/2013    Dr.Robert Sanders     KNEE ARTHROSCOPY Right 7/11/12    RIGHT KNEE ARTHROSCOPY WITH CHONDROPLASTY    KNEE SURGERY  July 2012    right, arthroscopy    KNEE SURGERY Right 2/18/2020 INCISION AND DRAINAGE RIGHT KNEE AND WOULD VAC PLACEMENT performed by Praneeth Atkinson MD at [address] Right 2/26/2021    INCISION AND DRAINAGE OF RIGHT KNEE HEMATOMA performed by Chantelle Wren MD at . [address] Right 3/5/2021    INCISION AND DRAINAGE AND CLOSURE RIGHT TOTAL KNEE performed by Chantelle Wren MD at [address]  2005    OTHER SURGICAL HISTORY Left 03/08/2016    CT biopsy ablasion left kidney    OTHER SURGICAL HISTORY  2016    small intestine 12 inch removed, 2 hernia surgeries, another surgery to drain infection from incision.  REVISION TOTAL KNEE ARTHROPLASTY Right 12/17/2019    RIGHT REVISION TIBIA TOTAL KNEE REPLACEMENT performed by Praneeth Atkinson MD at [address] [facility] Right 1/22/2020    RIGHT KNEE IRRIGATION AND DEBRIDEMENT WITH POLY EXCHANGE performed by Queen Caroline MD at [address] [facility] Right 2/16/2021    RIGHT REMOVAL OF EXPLANT AND TOTAL KNEE REPLACEMENT [facility] [address] [facility]    RIGHT    TOTAL KNEE ARTHROPLASTY Right 8/12/2020    RIGHT ARTHROPLASY  RESECTION WITH INSERTION OF SPACER performed by Praneeth Atkinson MD at [phone]  6-7-2016    UPPER GASTROINTESTINAL ENDOSCOPY  04/02/2018         CURRENTMEDICATIONS       Previous Medications    ATORVASTATIN (LIPITOR) 40 MG TABLET    TAKE 1 TABLET BY MOUTH EVERYDAY AT BEDTIME    CALCIUM-VITAMIN D (OSCAL 500/200 D-3) 500-200 MG-UNIT PER TABLET    Take 1 tablet by mouth 2 times daily     CLOPIDOGREL (PLAVIX) 75 MG TABLET    Take 1 tablet by mouth daily    FLUOXETINE (PROZAC) 20 MG CAPSULE    Take 1 capsule by mouth daily    GABAPENTIN (NEURONTIN) 600 MG TABLET    Take 600 mg by mouth 3 times daily.     MULTIPLE VITAMIN TABS    Take 1 tablet by mouth daily     PANTOPRAZOLE (PROTONIX) 40 MG TABLET    Take 1 tablet by mouth 2 times daily    SILDENAFIL (REVATIO) 20 MG TABLET    Take 4 tablets by mouth as needed (30 min prior to intercourse)    TRAZODONE (DESYREL) 100 MG TABLET    Take 2 tablets by mouth nightly    ZOLPIDEM (AMBIEN) 10 MG TABLET    Take by mouth nightly as needed for Sleep. ALLERGIES     Nsaids, Morphine, Prochlorperazine, Valtrex [valacyclovir hcl], Fentanyl, Morphine and related, and Tolmetin    FAMILYHISTORY       Family History   Problem Relation Age of Onset    Cancer Father         Lymphoma    Arthritis Mother     Heart Disease Maternal Uncle     Heart Disease Maternal Uncle     Diabetes Maternal Uncle           SOCIAL HISTORY       Social History     Tobacco Use    Smoking status: Former Smoker     Packs/day: 0.50     Years: 25.00     Pack years: 12.50     Types: Cigarettes     Start date: 1985     Quit date: 2021     Years since quittin.7    Smokeless tobacco: Never Used    Tobacco comment: Patient vapes   Vaping Use    Vaping Use: Every day    Substances: Flavoring    Devices: [employer]ble tank   Substance Use Topics    Alcohol use: No     Alcohol/week: 0.0 standard drinks     Comment: last drink     Drug use: Yes     Types: Marijuana (Weed)     Comment: medical marRecroup card       SCREENINGS             PHYSICAL EXAM    (up to 7 for level 4, 8 or more for level 5)     ED Triage Vitals [22 2244]   BP Temp Temp Source Pulse Resp SpO2 Height Weight   125/82 97.9 °F (36.6 °C) Temporal 64 18 100 % 6' (1.829 m) 213 lb 3 oz (96.7 kg)       Physical Exam  Vitals and nursing note reviewed. Constitutional:       General: He is not in acute distress. Appearance: He is well-developed. He is not ill-appearing, toxic-appearing or diaphoretic. HENT:      Head: Normocephalic and atraumatic. Eyes:      Conjunctiva/sclera: Conjunctivae normal.      Pupils: Pupils are equal, round, and reactive to light.    Cardiovascular:      Rate and Rhythm: Normal rate and regular rhythm. Heart sounds: Normal heart sounds. Pulmonary:      Effort: Pulmonary effort is normal. No respiratory distress. Abdominal:      General: Abdomen is flat. A surgical scar is present. Palpations: Abdomen is soft. Tenderness: There is generalized abdominal tenderness. There is guarding. Skin:     General: Skin is warm and dry. Neurological:      Mental Status: He is alert and oriented to person, place, and time. Psychiatric:         Behavior: Behavior normal. Behavior is cooperative. Thought Content: Thought content normal.         DIAGNOSTIC RESULTS   LABS:    Labs Reviewed   CBC WITH AUTO DIFFERENTIAL - Abnormal; Notable for the following components:       Result Value    Hemoglobin 13.1 (*)     Hematocrit 40.0 (*)     All other components within normal limits    Narrative:     Performed at:  [address] [address]   Phone [phone]   COMPREHENSIVE METABOLIC PANEL W/ REFLEX TO MG FOR LOW K - Abnormal; Notable for the following components:    Glucose 104 (*)     AST 12 (*)     All other components within normal limits    Narrative:     Performed at:  [address] [address]   Phone [phone], RAPID   LIPASE    Narrative:     Performed at:  [address] [address]   Phone [phone]   URINE RT REFLEX TO CULTURE    Narrative:     Performed at:  [address] [address]   Phone [phone]   LACTIC ACID, PLASMA       When ordered only abnormal lab results are displayed. All other labs were within normal range or not returned as of this dictation. EKG:  When ordered, EKG's are interpreted by the Emergency Department Physician in the absence of a cardiologist.  Please see their note for interpretation of EKG. RADIOLOGY:   Non-plain film images such as CT, Ultrasound and MRI are read by the radiologist. Plain radiographic images are visualized and preliminarily interpreted by the ED Provider with the below findings:    Interpretation per the Radiologist below, if available at the time of this note:    CT ABDOMEN PELVIS W IV CONTRAST Additional Contrast? None   Final Result   Previously noted partial small bowel obstruction is no longer visualized. Changes related to gastric bypass surgery and multiple small bowel   anastomosis. The mostly distended bowel loop is in right upper quadrant area   mostly consistent with cecum or new cecum measuring 6 cm. Cholelithiasis with no signs of cholecystitis. Status post splenectomy and left nephrectomy. Chronic superior endplate compression fracture of L1 with 40% height loss and   post kyphoplasty changes. RECOMMENDATIONS:   Unavailable           No results found. PROCEDURES   Unless otherwise noted below, none     Procedures    CONSULTS:  None      EMERGENCY DEPARTMENT COURSE and DIFFERENTIAL DIAGNOSIS/MDM:   Vitals:    Vitals:    01/17/22 2244   BP: 125/82   Pulse: 64   Resp: 18   Temp: 97.9 °F (36.6 °C)   TempSrc: Temporal   SpO2: 100%   Weight: 213 lb 3 oz (96.7 kg)   Height: 6' (1.829 m)       Patient was given the following medications:  Medications   0.9 % sodium chloride bolus (has no administration in time range)   HYDROmorphone (DILAUDID) injection 0.5 mg (has no administration in time range)   HYDROmorphone (DILAUDID) injection 1 mg (1 mg IntraVENous Given 1/18/22 0019)   ondansetron (ZOFRAN) injection 4 mg (4 mg IntraVENous Given 1/18/22 0019)   iopamidol (ISOVUE-370) 76 % injection 75 mL (75 mLs IntraVENous Given 1/18/22 0037)           ED COURSE & MEDICAL DECISION MAKING    - The patient presented to the ER with complaints of abdominal pain, nausea, vomiting. Vital signs were reviewed. Exam as above. Peripheral IV placed.  Labs, Imaging ordered. - Pertinent Labs & Imaging studies reviewed. (See chart for details)   -  Patient seen and evaluated in the emergency department. -  Triage and nursing notes reviewed and incorporated. -  Old chart records reviewed and incorporated. -   I have seen and evaluated this patient with my supervising physician No att. providers found. -  Differential diagnosis includes: kidney stone, pyelonephritis, UTI, appendicitis, bowel obstruction, diverticulitis, hernia, gastritis/gastroenteritis, pancreatitis, cholecystitis, hepatitis, constipation, IBS, IBD  -  Work-up included:  See above  -  ED treatment included:  zofran, dilaudid, IV fluids  -at the time of the shift change, labs and imaging studies were pending. Care was turned over to Dr Clifton Mercedes. Please see his note for final diagnosis and disposition for this patient    FINAL IMPRESSION      1. Generalized abdominal pain    2. Nausea and vomiting, intractability of vomiting not specified, unspecified vomiting type          DISPOSITION/PLAN   DISPOSITION        PATIENT REFERRED TO:  No follow-up provider specified.     DISCHARGE MEDICATIONS:  New Prescriptions    No medications on file       DISCONTINUED MEDICATIONS:  Discontinued Medications    No medications on file              (Please note that portions of this note were completed with a voice recognition program.  Efforts were made to edit the dictations but occasionally words are mis-transcribed.)    JEMAL Olea (electronically signed)           Fredy Lopez [place]  01/18/22 0128

## 2022-01-18 NOTE — ED NOTES
Bed: B-10  Expected date:   Expected time:   Means of arrival:   Comments:  #1     Tha Gonsalez RN  01/17/22 1168

## 2022-01-18 NOTE — ED PROVIDER NOTES
[address] [facility]      Pt Name: Antoni Jay  MRN: [id number]  [place] 1970  Date of evaluation: 1/17/2022  Provider: Mohinder Renee, [address] [place] [address]  Chief Complaint   Patient presents with    Emesis     x 2 after eating, hx of bowel obstruction    Abdominal Pain       I have fully participated in the care of Antoni Jay and have had a face-to-face evaluation. I have reviewed and agree with all pertinent clinical information, and midlevel provider's history, and physical exam. I have also reviewed the labs and imaging studies and treatment plan. I have also reviewed and agree with the medications, allergies and past medical history section for this Antoni Jay. I agree with the diagnosis, and I concur. I wore personal protective equipment when I was in the room the entire time. This includes gloves, N95 mask, face shield, and a glove over my stethoscope for protection. Past Medical History:   Diagnosis Date    Alcoholism [facility])     last drink 2015    Allergic migraine with status migrainosus     Allergic rhinitis, seasonal     Anemia     Arthritis     right knee    Vaughn's esophagus     Benign intracranial hypertension     Cancer (HCC)     renal     Carpal tunnel syndrome     Chronic pain     Depression     GERD (gastroesophageal reflux disease)     Headache(784.0)     History of blood transfusion     History of tobacco use     Quit 7/2014    Migraine     chronic for 1 year    Morbid obesity ([address]     Movement disorder     Onychomycosis     Sleep apnea, obstructive     Severe uses cpap stop bang 6    Unspecified cerebral artery occlusion with cerebral infarction 2014    slight weakness in left arm    Use of cane as ambulatory aid     Wears dentures     full set    Wears glasses        MDM:  Antoni Jay is a 49 y.o. male who presents with abdominal pain nausea vomiting.   He has had this before with small bowel obstructions. He recently had a small bowel obstruction. Physical exam shows abdomen to be diffusely tender. There is no distention or tympany. There are no masses or hepatosplenomegaly. There is mild guarding but no rebound rigidity. Heart is regular rate and rhythm murmurs clicks or rubs. Lungs are clear to auscultation. Heart rate is 64. CT scan did not reveal any evidence of obstruction and the previous obstruction had resolved. The remainder of his work-up was negative. He was discharged to follow-up with his doctor in 3 to 5 days and return if any problems. He was prescribed tramadol for pain.     Vitals:    01/17/22 2244   BP: 125/82   Pulse: 64   Resp: 18   Temp: 97.9 °F (36.6 °C)   SpO2: 100%       Lab results  Labs Reviewed   CBC WITH AUTO DIFFERENTIAL - Abnormal; Notable for the following components:       Result Value    Hemoglobin 13.1 (*)     Hematocrit 40.0 (*)     All other components within normal limits    Narrative:     Performed at:  [facility]  [address] [facility] [address]   Phone [phone]   COMPREHENSIVE METABOLIC PANEL W/ REFLEX TO MG FOR LOW K - Abnormal; Notable for the following components:    Glucose 104 (*)     AST 12 (*)     All other components within normal limits    Narrative:     Performed at:  [facility]  [address] [facility] [address]   Phone [phone], RAPID    Narrative:     Performed at:  [place] [address] [address] [facility] [address]   Phone [phone]   LIPASE    Narrative:     Performed at:  [facility]  [facility] [address]   Phone [phone]   URINE RT REFLEX TO CULTURE    Narrative:     [address]formed [address]:  [address] [address]   Phone [phone]   LACTIC ACID, PLASMA    Narrative:     Performed at:  [place] [address] [address] [facility] [address]   Phone [phone]     Radiology results  CT ABDOMEN PELVIS W IV CONTRAST Additional Contrast? None   Final Result   Previously noted partial small bowel obstruction is no longer visualized. Changes related to gastric bypass surgery and multiple small bowel   anastomosis. The mostly distended bowel loop is in right upper quadrant area   mostly consistent with cecum or new cecum measuring 6 cm. Cholelithiasis with no signs of cholecystitis. Status post splenectomy and left nephrectomy. Chronic superior endplate compression fracture of L1 with 40% height loss and   post kyphoplasty changes. RECOMMENDATIONS:   Unavailable             See discharge instructions for specific medications, discharge information, and treatments. They were verbally instructed to return to emergency if any problems. Medications   HYDROmorphone (DILAUDID) injection 1 mg (1 mg IntraVENous Given 1/18/22 0019)   ondansetron (ZOFRAN) injection 4 mg (4 mg IntraVENous Given 1/18/22 0019)   0.9 % sodium chloride bolus (1,000 mLs IntraVENous New Bag 1/18/22 0100)   iopamidol (ISOVUE-370) 76 % injection 75 mL (75 mLs IntraVENous Given 1/18/22 0037)   HYDROmorphone (DILAUDID) injection 0.5 mg (0.5 mg IntraVENous Given 1/18/22 0142)       New Prescriptions    TRAMADOL (ULTRAM) 50 MG TABLET    Take 1 tablet by mouth every 4 hours as needed for Pain for up to 3 days. Intended supply: 3 days. Take lowest dose possible to manage pain       The patient's blood pressure was found to be elevated according to CMS/Medicare and the Affordable Care Act/ObamaCare criteria. Elevated blood pressure could occur because of pain or anxiety or other reasons and does not mean that they need to have their blood pressure treated or medications otherwise adjusted.  However, this could also be a sign that they will need to have their blood pressure treated or medications changed. The patient was instructed to follow up closely with their personal physician to have their blood pressure rechecked. The patient was instructed to take a list of recent blood pressure readings to their next visit with their personal physician. IMPRESSIONS:  1. Generalized abdominal pain    2.  Nausea and vomiting, intractability of vomiting not specified, unspecified vomiting type                  Vanita Leon DO  01/18/22 0222

## 2022-01-19 ENCOUNTER — CARE COORDINATION (OUTPATIENT)
Dept: CASE MANAGEMENT | Age: 52
End: 2022-01-19

## 2022-01-19 NOTE — CARE COORDINATION
Transitions of Care Call  Call within 2 business days of discharge: Yes    Patient: Marie Ibarra Patient : 1970   MRN: [id number]  Reason for Admission: Emesis, abdominal pain  Discharge Date: 22 RARS: Readmission Risk Score: 10.6 ( )      Last Discharge [facility]       Complaint Diagnosis Description Type Department Provider    22 Emesis; Abdominal Pain Generalized abdominal pain . .. ED (DISCHARGE) REYNA Hernandez, DO          Was this an external facility discharge? No Discharge Facility: n/a    Challenges to be reviewed by the provider   Additional needs identified to be addressed with provider: No  none                 Encounter was routed to provider for escalation. Method of communication with provider: chart routing. Current Symptoms: no new symptoms and no worsening symptoms    Reviewed New or Changed Meds: yes    Do you have what you need at home?  Durable Medical Equipment ordered at discharge: None   Home Health/Outpatient orders at discharge: none   Was patient discharged with a pulse oximeter? No Discussed and confirmed pulse oximeter discharge instructions and when to notify provider or seek emergency care. Patient education provided: Reviewed appropriate site of care based on symptoms and resources available to patient including: PCP.      Follow up appointment scheduled within 7 days of discharge: no. If no appointment scheduled, scheduling offered: yes  Future Appointments   Date Time Provider Timothy Beal   2022 12:00 PM SCHEDULE, [facility]   2022 10:00 AM MD KATIE Zurita [facility]   3/7/2022 12:00 PM SCHEDULE, [facility]SM[facility]   3/18/2022 11:40 AM Tiffanie R Kehrt, APRN - CNP [facility]   2022 12:15 PM SCHEDULE, [facility]   2022  7:45 AM SCHEDULE, [facility]SM[facility]   2022  9:00 AM SCHEDULE, [facility]SM[facility]   7/25/2022  8:15 AM SCHEDULE, [facility] REMOTE TRANSMISSION [facility]   8/29/2022  7:45 AM SCHEDULE, [facility] REMOTE TRANSMISSION [facility]   10/3/2022  7:45 AM SCHEDULE, [facility] REMOTE TRANSMISSION [facility]   11/7/2022  7:45 AM SCHEDULE, [facility] REMOTE TRANSMISSION [facility]   12/12/2022  7:45 AM SCHEDULE, [facility] REMOTE TRANSMISSION [facility]       Interventions: Obtained and reviewed discharge summary and/or continuity of care documents  Reviewed discharge instructions, medical action plan and red flags with patient who verbalized understanding. Plan for follow-up call in 5-7 days based on severity of symptoms and risk factors. Plan for next call: symptom management-.  self management-.  follow up appointment-. Provided contact information for future needs. Pt reports to be doing well today. Denies any more nausea or vomiting and reports he is able to eat and drink without issue. States he has had a BM and passing gas since ER visit. States he will follow up with PCP and will be making that appointment today. Denies any further needs. Will continue to follow.      RONNIE Cruz, RN   Care Transition Nurse  Mobile: [phone]

## 2022-01-25 ENCOUNTER — CARE COORDINATION (OUTPATIENT)
Dept: CASE MANAGEMENT | Age: 52
End: 2022-01-25

## 2022-01-25 ENCOUNTER — OFFICE VISIT (OUTPATIENT)
Dept: FAMILY MEDICINE CLINIC | Age: 52
End: 2022-01-25
Payer: COMMERCIAL

## 2022-01-25 VITALS
HEART RATE: 83 BPM | BODY MASS INDEX: 29.39 KG/M2 | WEIGHT: 217 LBS | OXYGEN SATURATION: 98 % | SYSTOLIC BLOOD PRESSURE: 112 MMHG | DIASTOLIC BLOOD PRESSURE: 66 MMHG | HEIGHT: 72 IN

## 2022-01-25 DIAGNOSIS — M81.8 OTHER OSTEOPOROSIS WITHOUT CURRENT PATHOLOGICAL FRACTURE: ICD-10-CM

## 2022-01-25 DIAGNOSIS — G89.4 CHRONIC PAIN SYNDROME: Chronic | ICD-10-CM

## 2022-01-25 DIAGNOSIS — F51.01 PRIMARY INSOMNIA: Chronic | ICD-10-CM

## 2022-01-25 DIAGNOSIS — K56.609 SMALL BOWEL OBSTRUCTION (HCC): Primary | ICD-10-CM

## 2022-01-25 PROCEDURE — 99214 OFFICE O/P EST MOD 30 MIN: CPT | Performed by: FAMILY MEDICINE

## 2022-01-25 RX ORDER — ZOLEDRONIC ACID 5 MG/100ML
5 INJECTION, SOLUTION INTRAVENOUS ONCE
Qty: 100 ML | Refills: 0 | Status: SHIPPED | OUTPATIENT
Start: 2022-01-25 | End: 2022-03-22

## 2022-01-25 NOTE — PROGRESS NOTES
2022    Blood pressure 112/66, pulse 83, height 6' (1.829 m), weight 217 lb (98.4 kg), SpO2 98 %. Keegan Rankin (:  1970) is a 49 y.o. male, here for evaluation of the following medical concerns:    Chief Complaint   Patient presents with   [address] from [address] [place] [address] I-20 f/u - end of Dec      Patient admitted to [facility] on 21 for 3 days for partial small bowel obstruction. SBO resolved with conservative treatment. On , he presented to ED 2-3 hours after eating and having n/v and intractable pain. CT revealed no new bowel obstructions. Treated conservatively with zofran, dilaudid and IV fluids. Reports chronic pain associated with adhesions but no acute pain today. Takes GI coctail nightly to help regulate bowels. Receives ketamine infusion every 6 wks for pain. L1 kyphoplasty for compression fracture on   Does not require oxycodone for pain management anymore  DEXA scan prior to surgery showed osteopenia in lumbar spine and bilateral hips   Dr. Baptiste Heal surgeon recommended bisphosphonate therapy   He is taking calcium BID already (he thinks 500 mg each dose) with D. His vit D level was normal (51.8) a year ago. No new problems otherwise. 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     Chronic pain syndrome    History of stroke: right insular cortex on 2014 (small PFO; hypergoag w/u neg,  neurology)    Gastroesophageal reflux disease with esophagitis--on daily ppi    Intractable chronic migraine without aura and with status migrainosus    Iron deficiency anemia    B12 deficiency    Malignant neoplasm of left kidney (HCC) clear cell. 8/1/18 Dr Khoa Hooper.  History of depression    History of alcoholism ([facility] [address]    History of total knee arthroplasty, right    Hematoma of right knee region    TIA (transient ischemic attack) LUE weakness 3/21    Tobacco use    Acute deep vein thrombosis (DVT) of calf muscle vein of both lower extremities (HCC)    Closed compression fracture of body of L1 vertebra (HCC)    Osteopenia of multiple sites    Small bowel obstruction (HCC)        Body mass index is 29.43 kg/m². Wt Readings from Last 3 Encounters:   01/25/22 217 lb (98.4 kg)   01/17/22 213 lb 3 oz (96.7 kg)   12/28/21 201 lb 15.1 oz (91.6 kg)       BP Readings from Last 3 Encounters:   01/25/22 112/66   01/18/22 114/70   12/28/21 110/68       Allergies   Allergen Reactions    Nsaids Nausea Only and Other (See Comments)     Hx of Barretts esophagus      Morphine Hives and Itching     Pt gets Hives/itching. Does not tolerate     Prochlorperazine Other (See Comments)     No allergic reaction, patient reported sense of \"restlessness\" and fidgiting    Valtrex [Valacyclovir Hcl] Diarrhea    Fentanyl Itching    Morphine And Related Hives and Itching    Tolmetin Nausea Only     Hx of Barretts esophagus       Prior to Visit Medications    Medication Sig Taking? Authorizing Provider   atorvastatin (LIPITOR) 40 MG tablet TAKE 1 TABLET BY MOUTH EVERYDAY AT BEDTIME Yes Elyssa Castillo MD   FLUoxetine (PROZAC) 20 MG capsule Take 1 capsule by mouth daily Yes Cecile Rosas MD   traZODone (DESYREL) 100 MG tablet Take 2 tablets by mouth nightly Yes Cecile Rosas MD   pantoprazole (PROTONIX) 40 MG tablet Take 1 tablet by mouth 2 times daily Yes Elyssa Castillo MD   clopidogrel (PLAVIX) 75 MG tablet Take 1 tablet by mouth daily Yes Angela Thomson MD   gabapentin (NEURONTIN) 600 MG tablet Take 600 mg by mouth 3 times daily.  Yes Historical Provider, MD   sildenafil (REVATIO) 20 MG tablet Take 4 tablets by mouth as needed (30 min prior to intercourse) Yes Percy Leavitt MD   calcium-vitamin D (OSCAL 500/200 D-3) 500-200 MG-UNIT per tablet Take 1 tablet by mouth 2 times daily  Yes Historical Provider, MD   Multiple Vitamin TABS Take 1 tablet by mouth daily  Yes Historical Provider, MD   oxyCODONE (ROXICODONE) 5 MG immediate release tablet Take 1 tablet by mouth 4 times daily for 30 days. Patient not taking: Reported on 2022  Percy Leavitt MD   zolpidem (AMBIEN) 10 MG tablet Take by mouth nightly as needed for Sleep. Patient not taking: Reported on 2022  Historical Provider, MD        Social History     Tobacco Use    Smoking status: Former Smoker     Packs/day: 0.50     Years: 25.00     Pack years: 12.50     Types: Cigarettes     Start date: 1985     Quit date: 2021     Years since quittin.7    Smokeless tobacco: Never Used    Tobacco comment: Patient vapes   Vaping Use    Vaping Use: Every day    Substances: Flavoring    Devices: [employer] tank   Substance Use Topics    Alcohol use: No     Alcohol/week: 0.0 standard drinks     Comment: last drink     Drug use: Yes     Types: Marijuana Eston Adrienne)     Comment: medical [facility] card       Review of Systems As above. Physical Exam  Vitals and nursing note reviewed. Constitutional:       General: He is not in acute distress. Appearance: Normal appearance. He is well-developed. He is not diaphoretic. Cardiovascular:      Rate and Rhythm: Normal rate and regular rhythm. Pulses: Normal pulses. Heart sounds: Normal heart sounds. No murmur heard. Pulmonary:      Effort: Pulmonary effort is normal. No respiratory distress. Breath sounds: Normal breath sounds. No wheezing or rales. Abdominal:      General: Abdomen is flat. Bowel sounds are normal. There is no distension. Palpations: Abdomen is soft. Tenderness:  There is abdominal tenderness (diffuse abdominal tenderness without mass or guarding). Musculoskeletal:      Cervical back: Normal range of motion. Right lower leg: No edema. Left lower leg: No edema. Skin:     General: Skin is warm and dry. Neurological:      General: No focal deficit present. Mental Status: He is alert and oriented to person, place, and time. Mental status is at baseline. Psychiatric:         Mood and Affect: Mood normal.         Behavior: Behavior normal.         Thought Content: Thought content normal.         Judgment: Judgment normal.         ASSESSMENT/PLAN:    1. Small bowel obstruction ([address]  - resolved. Back to baseline pain (chronic; related to numerous surgeries and obstructions/adhesions)    2. Primary insomnia  - off ambien. sleeping well on trazodone plus MJ edibles. 3. Chronic pain syndrome- abdominal and head  - per pain medicine. We discussed some other options such as Lyrica and SNRI meds as adjuncts. However this has been a longstanding problem for him, has seen many specialists over the years, and has used nearly every possible medicine. He is stable on current regimen. 4. Other osteoporosis without current pathological fracture (osteopenia on DEXA 12/21, with compression fx)  - osteopenia plus fragility fracture equals osteopeina. - continue calcium D (500/500 bid). - recommend reclast yearly for 5 yrs. Will fax order to infusion center      Return in about 4 months (around 5/25/2022). An  [employer] Guide[employer]ature was used to authenticate this note.     --Cj Jackson MD on 1/26/2022  at 11:58 AM

## 2022-01-29 ENCOUNTER — HOSPITAL ENCOUNTER (EMERGENCY)
Age: 52
Discharge: HOME | End: 2022-01-29
Payer: COMMERCIAL

## 2022-01-29 ENCOUNTER — APPOINTMENT (OUTPATIENT)
Dept: GENERAL RADIOLOGY | Age: 52
End: 2022-01-29
Payer: COMMERCIAL

## 2022-01-29 VITALS
BODY MASS INDEX: 29.43 KG/M2 | OXYGEN SATURATION: 97 % | SYSTOLIC BLOOD PRESSURE: 136 MMHG | RESPIRATION RATE: 16 BRPM | HEART RATE: 79 BPM | TEMPERATURE: 98.9 F | HEIGHT: 72 IN | DIASTOLIC BLOOD PRESSURE: 86 MMHG

## 2022-01-29 DIAGNOSIS — M25.552 LEFT HIP PAIN: Primary | ICD-10-CM
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
## 2022-01-29 PROCEDURE — 6370000000 HC RX 637 (ALT 250 FOR IP): Performed by: NURSE PRACTITIONER

## 2022-01-29 PROCEDURE — 73502 X-RAY EXAM HIP UNI 2-3 VIEWS: CPT

## 2022-01-29 PROCEDURE — 99283 EMERGENCY DEPT VISIT LOW MDM: CPT

## 2022-01-29 RX ORDER — OXYCODONE HYDROCHLORIDE AND ACETAMINOPHEN 5; 325 MG/1; MG/1
2 TABLET ORAL ONCE
Status: COMPLETED | OUTPATIENT
Start: 2022-01-29 | End: 2022-01-29

## 2022-01-29 RX ORDER — METHOCARBAMOL 500 MG/1
500 TABLET, FILM COATED ORAL 4 TIMES DAILY PRN
Qty: 20 TABLET | Refills: 0 | Status: SHIPPED | OUTPATIENT
Start: 2022-01-29 | End: 2022-02-08

## 2022-01-29 RX ORDER — OXYCODONE HYDROCHLORIDE AND ACETAMINOPHEN 5; 325 MG/1; MG/1
1 TABLET ORAL EVERY 6 HOURS PRN
Qty: 12 TABLET | Refills: 0 | Status: SHIPPED | OUTPATIENT
Start: 2022-01-29 | End: 2022-02-01

## 2022-01-29 RX ORDER — DIAZEPAM 5 MG/1
5 TABLET ORAL ONCE
Status: COMPLETED | OUTPATIENT
Start: 2022-01-29 | End: 2022-01-29

## 2022-01-29 RX ORDER — LIDOCAINE 50 MG/G
1 PATCH TOPICAL DAILY
Qty: 10 PATCH | Refills: 0 | Status: SHIPPED | OUTPATIENT
Start: 2022-01-29 | End: 2022-02-08

## 2022-01-29 RX ADMIN — DIAZEPAM 5 MG: 5 TABLET ORAL at 17:53

## 2022-01-29 RX ADMIN — OXYCODONE AND ACETAMINOPHEN 2 TABLET: 5; 325 TABLET ORAL at 17:17

## 2022-01-29 ASSESSMENT — PAIN DESCRIPTION - PROGRESSION: CLINICAL_PROGRESSION: NOT CHANGED

## 2022-01-29 ASSESSMENT — PAIN SCALES - GENERAL: PAINLEVEL_OUTOF10: 9

## 2022-01-29 ASSESSMENT — PAIN DESCRIPTION - LOCATION: LOCATION: HIP

## 2022-01-29 ASSESSMENT — PAIN DESCRIPTION - ORIENTATION: ORIENTATION: LEFT

## 2022-01-29 ASSESSMENT — PAIN DESCRIPTION - PAIN TYPE: TYPE: ACUTE PAIN

## 2022-01-29 ASSESSMENT — PAIN DESCRIPTION - DESCRIPTORS: DESCRIPTORS: SHARP

## 2022-01-29 ASSESSMENT — PAIN DESCRIPTION - ONSET: ONSET: SUDDEN

## 2022-01-29 ASSESSMENT — PAIN - FUNCTIONAL ASSESSMENT: PAIN_FUNCTIONAL_ASSESSMENT: PREVENTS OR INTERFERES SOME ACTIVE ACTIVITIES AND ADLS

## 2022-01-29 ASSESSMENT — PAIN DESCRIPTION - FREQUENCY: FREQUENCY: CONTINUOUS

## 2022-01-29 NOTE — Clinical Note
Venkata Hatch was seen and treated in our emergency department on 1/29/2022. He may return to work on 02/03/2022. If you have any questions or concerns, please don't hesitate to call.       Molina Weiner, APRN - CNP

## 2022-01-29 NOTE — ED PROVIDER NOTES
[address] [facility] [address] [facility]  [address] [address]  Dept: [phone]  Loc: [address] ENCOUNTER        This patient was not seen or evaluated by the attending physician. I evaluated this patient, the attending physician was available for consultation. CHIEF COMPLAINT    Chief Complaint   Patient presents with    Hip Pain     slipped on ice and fell on left hip        HPI    Dar Scruggs is a 49 y.o. male who presents with left hip pain. Onset was shortly prior to arrival. The quality is sharp throbbing, and the severity is 10/10. The patient has associated no other symptoms. The context was patient slipped and fell on ice landing on his left hip. Denies any lower back pain. Denies any knee or ankle pain. No numbness or tingling distal to the site of pain. He states that is mainly concentrated on his left hip. Did not hit his head or lose consciousness. Is concern for fracture as he has arthritis in the left hip therefore came to the ED for further evaluation and treatment. REVIEW OF SYSTEMS    Neurologic: no head injury, no LOC  Cardiac: No Chest Pain, no syncope  Respiratory: No cough or difficulty breathing  GI: No Bloody Stool or Diarrhea  : No Dysuria or Hematuria  Musculoskeletal: see HPI  General: No Fever  All other systems reviewed and are negative.     PAST MEDICAL & SURGICAL HISTORY    Past Medical History:   Diagnosis Date    Alcoholism [facility])     last drink 2015    Allergic migraine with status migrainosus     Allergic rhinitis, seasonal     Anemia     Arthritis     right knee    Vaughn's esophagus     Benign intracranial hypertension     Cancer (HCC)     renal     Carpal tunnel syndrome     Chronic pain     Depression     GERD (gastroesophageal reflux disease)     Headache(784.0)     History of blood transfusion     History of tobacco use     Quit 7/2014    Migraine chronic for 1 year    Morbid obesity ([address]     Movement disorder     Onychomycosis     Sleep apnea, obstructive     Severe uses cpap stop bang 6    Unspecified cerebral artery occlusion with cerebral infarction 2014    slight weakness in left arm    Use of cane as ambulatory aid     Wears dentures     full set    Wears glasses      Past Surgical History:   Procedure Laterality Date    ABDOMEN SURGERY      gastric bypass    ABDOMEN SURGERY  4-7-2016    repair of recurrent incisional hernia with mesh, removal of old mesh, bilateral component separation    ABDOMINAL EXPLORATION SURGERY  1/11/16    exp lap, lysis of adhesions, small bowel resection    CARPAL TUNNEL RELEASE      bilat    DILATATION, ESOPHAGUS      ENDOSCOPY, COLON, DIAGNOSTIC      EYE SURGERY      GASTRIC BYPASS SURGERY  Jan 2009    Has lost about 200 pounds.  HERNIA REPAIR  3-    ventral    JOINT REPLACEMENT      KIDNEY REMOVAL Left 08/01/2018    KNEE ARTHROSCOPY Right 7/11/2013    Dr.Robert Sanders     KNEE ARTHROSCOPY Right 7/11/12    RIGHT KNEE ARTHROSCOPY WITH CHONDROPLASTY    KNEE SURGERY  July 2012    right, arthroscopy    KNEE SURGERY Right 2/18/2020    INCISION AND DRAINAGE RIGHT KNEE AND WOULD VAC PLACEMENT performed by Katie Rendon MD at [address] Right 2/26/2021    INCISION AND DRAINAGE OF RIGHT KNEE HEMATOMA performed by Tyler Boucher MD at . [address] Right 3/5/2021    INCISION AND DRAINAGE AND CLOSURE RIGHT TOTAL KNEE performed by Tyler Boucher MD at [address] [facility]  2005    OTHER SURGICAL HISTORY Left 03/08/2016    CT biopsy ablasion left kidney    OTHER SURGICAL HISTORY  2016    small intestine 12 inch removed, 2 hernia surgeries, another surgery to drain infection from incision.      REVISION TOTAL KNEE ARTHROPLASTY Right 12/17/2019    RIGHT REVISION TIBIA TOTAL KNEE REPLACEMENT performed by Katie Rendon MD at [address] [facility] ARTHROPLASTY Right 1/22/2020    RIGHT KNEE IRRIGATION AND DEBRIDEMENT WITH POLY EXCHANGE performed by Hudson Ramirez MD at [address] [facility] [address] Right 2/16/2021    RIGHT REMOVAL OF EXPLANT AND TOTAL KNEE REPLACEMENT performed by Nae Sage MD at [address] [facility]  10/15/13    RIGHT    TOTAL KNEE ARTHROPLASTY Right 8/12/2020    RIGHT ARTHROPLASY  RESECTION WITH INSERTION OF SPACER performed by Brielle Bell MD at [address] [facility]  6-7-2016    UPPER GASTROINTESTINAL ENDOSCOPY  04/02/2018       CURRENT MEDICATIONS    Current Outpatient Rx   Medication Sig Dispense Refill    methocarbamol (ROBAXIN) 500 MG tablet Take 1 tablet by mouth 4 times daily as needed (muscle pain/spasm) 20 tablet 0    oxyCODONE-acetaminophen (PERCOCET) 5-325 MG per tablet Take 1 tablet by mouth every 6 hours as needed for Pain for up to 3 days. Intended supply: 3 days. Take lowest dose possible to manage pain 12 tablet 0    lidocaine (LIDODERM) 5 % Place 1 patch onto the skin daily for 10 days 12 hours on, 12 hours off. 10 patch 0    zoledronic acid (RECLAST) 5 MG/100ML SOLN Infuse 100 mLs intravenously once for 1 dose 100 mL 0    atorvastatin (LIPITOR) 40 MG tablet TAKE 1 TABLET BY MOUTH EVERYDAY AT BEDTIME 90 tablet 1    FLUoxetine (PROZAC) 20 MG capsule Take 1 capsule by mouth daily 90 capsule 1    traZODone (DESYREL) 100 MG tablet Take 2 tablets by mouth nightly 180 tablet 1    pantoprazole (PROTONIX) 40 MG tablet Take 1 tablet by mouth 2 times daily 180 tablet 1    clopidogrel (PLAVIX) 75 MG tablet Take 1 tablet by mouth daily 90 tablet 1    gabapentin (NEURONTIN) 600 MG tablet Take 600 mg by mouth 3 times daily.       sildenafil (REVATIO) 20 MG tablet Take 4 tablets by mouth as needed (30 min prior to intercourse) 30 tablet 0    calcium-vitamin D (OSCAL 500/200 D-3) 500-200 MG-UNIT per tablet Take 1 tablet by mouth 2 times daily       Multiple Vitamin TABS Take 1 tablet by mouth daily          ALLERGIES    Allergies   Allergen Reactions    Nsaids Nausea Only and Other (See Comments)     Hx of Barretts esophagus      Morphine Hives and Itching     Pt gets Hives/itching. 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 Tobacco Use    Smoking status: Former Smoker     Packs/day: 0.50     Years: 25.00     Pack years: 12.50     Types: Cigarettes     Start date: 1985     Quit date: 2021     Years since quittin.7    Smokeless tobacco: Never Used    Tobacco comment: Patient vapes   Vaping Use    Vaping Use: Never used   Substance and Sexual Activity    Alcohol use: No     Alcohol/week: 0.0 standard drinks     Comment: last drink     Drug use: Yes     Types: Marijuana Britt Bachelor)     Comment: medical angea card    Sexual activity: Yes     Partners: Female     Comment: wife Toribio Combs   Other Topics Concern    None   Social History Narrative    None     Social Determinants of Health     Financial Resource Strain:     Difficulty of Paying Living Expenses: Not on file   Food Insecurity:     Worried About Running Out of Food in the Last Year: Not on file    Steven of Food in the Last Year: Not on file   Transportation Needs:     Lack of Transportation (Medical): Not on file    Lack of Transportation (Non-Medical):  Not on file   Physical Activity:     Days of Exercise per Week: Not on file    Minutes of Exercise per Session: Not on file   Stress:     Feeling of Stress : Not on file   Social Connections:     Frequency of Communication with Friends and Family: Not on file    Frequency of Social Gatherings with Friends and Family: Not on file    Attends Druze Services: Not on file    Active Member of [address] [place] [address] or Organizations: Not on file    Attends Club or Organization Meetings: Not on file    Marital Status: Not on file   Intimate Partner Violence:     Fear of Current or Ex-Partner: Not on file    Emotionally Abused: Not on file    Physically Abused: Not on file    Sexually Abused: Not on file   Housing Stability:     Unable to Pay for Housing in the Last Year: Not on file    Number of [place] in the Last Year: Not on file    Unstable Housing in the Last Year: Not on file     Family History   Problem Relation Age of Onset    Cancer Father         Lymphoma    Arthritis Mother     Heart Disease Maternal Uncle     Heart Disease Maternal Uncle     Diabetes Maternal Uncle        PHYSICAL EXAM    VITAL SIGNS: /86   Pulse 79   Temp 98.9 °F (37.2 °C) (Oral)   Resp 16   Ht 6' (1.829 m)   SpO2 97%   BMI 29.43 kg/m²    Constitutional:  Well developed, well nourished, no acute distress   Eyes: Pupils equally round and react to light, sclera nonicteric  HENT:  Normocephalic, atraumatic, no trismus, no interiano signs or raccoon eyes  Neck: supple, no JVD, no posterior neck tenderness  Respiratory:  Lungs clear to auscultation bilaterally, no retractions   Cardiovascular:  regular rate, no murmurs  GI:  Soft, nontender, normal bowel sounds  Musculoskeletal:  No edema, + large amount of pain upon palpation of the left SI joint. Is moving his extremity but very little. Range of motion deferred due to the patient's extreme discomfort with any type of movement. No left knee, or ankle pain palpated.   No distal femur pain upon palpation  Vascular:  pedal pulses are 2+ equal bilaterally  Integument:  Well hydrated, no petechiae   Neurologic:  Awake, alert, oriented x4, no aphasia, no slurred speech, CN II-XII intact, sensation to light touch intact bilaterally  Psych: Pleasant affect, no hallucinations      RADIOLOGY  (interpreted by the radiologist)  XR HIP LEFT (2-3 VIEWS)   Final Result   No acute abnormality of the hip. ED COURSE & MEDICAL DECISION MAKING    Pertinent Labs & Imaging studies reviewed and interpreted. (See chart for details)  See chart for details of medications given during the ED stay. Vitals:    01/29/22 1402   BP: 136/86   Pulse: 79   Resp: 16   Temp: 98.9 °F (37.2 °C)   TempSrc: Oral   SpO2: 97%   Height: 6' (1.829 m)       Differential Diagnosis: Cardiac Arrhythmia, Stroke, Sepsis/Infection, Anemia, Fracture, Dislocation, other. Patient is afebrile and nontoxic in appearance. Patient is neurovascularly intact. Plain films as read by the radiologist as above. Reevaluation: Patient is resting comfortably. Still having some pain, likely musculoskeletal, could be potentially a ligamental strain/sprain. I explained this with the patient and he will need to follow-up with orthopedics. I see no reason for admission at this point in time. He can ambulate and has a walker to assist him to ambulate at home as well. We will give him medications for symptoms until he can follow-up with orthopedics; he can return immediately for any new or worsening symptoms. He is in agreement with this plan as well as the plan of discharge, has no further questions or concerns and will be discharged home in stable condition. I estimate there is LOW risk for COMPARTMENT SYNDROME, DEEP VENOUS THROMBOSIS, SEPTIC ARTHRITIS, OR NEUROVASCULAR INJURY, thus I consider the discharge disposition reasonable.  Mirtha Ambrose and I have discussed the diagnosis and risks, and we agree with discharging home to follow-up with their primary doctor or the referral orthopedist. We also discussed returning to the Emergency Department immediately if new or worsening symptoms occur. We have discussed the symptoms which are most concerning (e.g., changing or worsening pain, numbness, weakness) that necessitate immediate return. Blood pressure 136/86, pulse 79, temperature 98.9 °F (37.2 °C), temperature source Oral, resp. rate 16, height 6' (1.829 m), SpO2 97 %. FINAL IMPRESSION    1. Left hip pain    2.  Fall, initial encounter          PLAN  Discharge with outpatient follow-up with orthopedics and PCP      (Please note that this note was completed with a voice recognition program.  Every attempt was made to edit the dictations, but inevitably there remain words that are mis-transcribed.)          LILLIE Deleon - ELEAZAR  01/29/22 8840

## 2022-01-29 NOTE — Clinical Note
Homer Reddy was seen and treated in our emergency department on 1/29/2022. He may return to work on 02/03/2022. If you have any questions or concerns, please don't hesitate to call.       Gabriella Hudson, LILLIE - CNP

## 2022-01-31 ENCOUNTER — NURSE ONLY (OUTPATIENT)
Dept: CARDIOLOGY CLINIC | Age: 52
End: 2022-01-31
Payer: COMMERCIAL

## 2022-01-31 DIAGNOSIS — I63.9 CEREBROVASCULAR ACCIDENT (CVA), UNSPECIFIED MECHANISM (HCC): ICD-10-CM

## 2022-01-31 DIAGNOSIS — Z45.09 ENCOUNTER FOR LOOP RECORDER CHECK: ICD-10-CM

## 2022-01-31 DIAGNOSIS — G45.9 TIA (TRANSIENT ISCHEMIC ATTACK): ICD-10-CM

## 2022-01-31 DIAGNOSIS — R55 SYNCOPE AND COLLAPSE: ICD-10-CM

## 2022-01-31 RX ORDER — ALBUTEROL SULFATE 90 UG/1
4 AEROSOL, METERED RESPIRATORY (INHALATION) PRN
Status: CANCELLED | OUTPATIENT
Start: 2022-01-31

## 2022-01-31 RX ORDER — SODIUM CHLORIDE 9 MG/ML
INJECTION, SOLUTION INTRAVENOUS CONTINUOUS
Status: CANCELLED | OUTPATIENT
Start: 2022-01-31

## 2022-01-31 RX ORDER — ZOLEDRONIC ACID 5 MG/100ML
5 INJECTION, SOLUTION INTRAVENOUS ONCE
Status: CANCELLED | OUTPATIENT
Start: 2022-01-31 | End: 2022-01-31

## 2022-01-31 RX ORDER — DIPHENHYDRAMINE HYDROCHLORIDE 50 MG/ML
50 INJECTION INTRAMUSCULAR; INTRAVENOUS
Status: CANCELLED | OUTPATIENT
Start: 2022-01-31

## 2022-01-31 RX ORDER — EPINEPHRINE 1 MG/ML
0.3 INJECTION, SOLUTION, CONCENTRATE INTRAVENOUS PRN
Status: CANCELLED | OUTPATIENT
Start: 2022-01-31

## 2022-01-31 RX ORDER — ONDANSETRON 2 MG/ML
8 INJECTION INTRAMUSCULAR; INTRAVENOUS
Status: CANCELLED | OUTPATIENT
Start: 2022-01-31

## 2022-01-31 RX ORDER — ACETAMINOPHEN 325 MG/1
650 TABLET ORAL
Status: CANCELLED | OUTPATIENT
Start: 2022-01-31

## 2022-01-31 RX ORDER — SODIUM CHLORIDE 0.9 % (FLUSH) 0.9 %
5-40 SYRINGE (ML) INJECTION PRN
Status: CANCELLED | OUTPATIENT
Start: 2022-01-31

## 2022-02-02 ENCOUNTER — OFFICE VISIT (OUTPATIENT)
Dept: ORTHOPEDIC SURGERY | Age: 52
End: 2022-02-02
Payer: COMMERCIAL

## 2022-02-02 VITALS — WEIGHT: 217 LBS | BODY MASS INDEX: 29.39 KG/M2 | HEIGHT: 72 IN

## 2022-02-02 DIAGNOSIS — F17.200 CURRENT SMOKER: ICD-10-CM

## 2022-02-02 DIAGNOSIS — S70.02XA CONTUSION OF LEFT HIP, INITIAL ENCOUNTER: Primary | ICD-10-CM

## 2022-02-02 PROCEDURE — 99406 BEHAV CHNG SMOKING 3-10 MIN: CPT | Performed by: ORTHOPAEDIC SURGERY

## 2022-02-02 PROCEDURE — 99203 OFFICE O/P NEW LOW 30 MIN: CPT | Performed by: ORTHOPAEDIC SURGERY

## 2022-02-02 RX ORDER — TRAMADOL HYDROCHLORIDE 50 MG/1
50 TABLET ORAL EVERY 4 HOURS PRN
Qty: 18 TABLET | Refills: 0 | Status: SHIPPED | OUTPATIENT
Start: 2022-02-02 | End: 2022-02-05

## 2022-02-03 PROCEDURE — G2066 INTER DEVC REMOTE 30D: HCPCS | Performed by: INTERNAL MEDICINE

## 2022-02-03 PROCEDURE — 93298 REM INTERROG DEV EVAL SCRMS: CPT | Performed by: INTERNAL MEDICINE

## 2022-02-03 NOTE — PROGRESS NOTES
CHIEF COMPLAINT: Left hip pain/ hip contusion. DATE OF INJURY:  1/29/2022    Mr. Van Ureña 49 y.o.  male presents today for evaluation of a left hip pain which occurred when he fell on ice. He is complaining of left hip pain. This is better with rest and worse with bearing any wt. The pain is sharp and not radiating. No numbness or tingling sensation. No other complaint. He was seen 1st at [facility], where he was x-rayed and asked to f/u with Orthopedic. Past Medical History:   Diagnosis Date    Alcoholism [facility])     last drink 2015    Allergic migraine with status migrainosus     Allergic rhinitis, seasonal     Anemia     Arthritis     right knee    Vaughn's esophagus     Benign intracranial hypertension     Cancer (HCC)     renal     Carpal tunnel syndrome     Chronic pain     Depression     GERD (gastroesophageal reflux disease)     Headache(784.0)     History of blood transfusion     History of tobacco use     Quit 7/2014    Migraine     chronic for 1 year    Morbid obesity ([address]     Movement disorder     Onychomycosis     Sleep apnea, obstructive     Severe uses cpap stop bang 6    Unspecified cerebral artery occlusion with cerebral infarction 2014    slight weakness in left arm    Use of cane as ambulatory aid     Wears dentures     full set    Wears glasses        Past Surgical History:   Procedure Laterality Date    ABDOMEN SURGERY      gastric bypass    ABDOMEN SURGERY  4-7-2016    repair of recurrent incisional hernia with mesh, removal of old mesh, bilateral component separation    ABDOMINAL EXPLORATION SURGERY  1/11/16    exp lap, lysis of adhesions, small bowel resection    CARPAL TUNNEL RELEASE      bilat    DILATATION, ESOPHAGUS      ENDOSCOPY, COLON, DIAGNOSTIC      EYE SURGERY      GASTRIC BYPASS SURGERY  Jan 2009    Has lost about 200 pounds.     HERNIA REPAIR  3-    ventral    JOINT REPLACEMENT      KIDNEY REMOVAL Left 08/01/2018    [facility]     KNEE ARTHROSCOPY Right 7/11/12    RIGHT KNEE ARTHROSCOPY WITH CHONDROPLASTY    KNEE SURGERY  July 2012    right, arthroscopy    KNEE SURGERY Right 2/18/2020    INCISION AND DRAINAGE RIGHT KNEE AND WOULD VAC PLACEMENT performed by Marino Bailey MD at [address] Right 2/26/2021    INCISION AND DRAINAGE OF RIGHT KNEE HEMATOMA performed by Pernell Hoyt MD at [address] Right 3/5/2021    INCISION AND DRAINAGE AND CLOSURE RIGHT TOTAL KNEE performed by Pernell Hoyt MD at [address] [facility]  2005    OTHER SURGICAL HISTORY Left 03/08/2016    CT biopsy ablasion left kidney    OTHER SURGICAL HISTORY  2016    small intestine 12 inch removed, 2 hernia surgeries, another surgery to drain infection from incision.  REVISION TOTAL KNEE ARTHROPLASTY Right 12/17/2019    RIGHT REVISION TIBIA TOTAL KNEE REPLACEMENT performed by Marino Bailey MD at [address] [facility] Right 1/22/2020    RIGHT KNEE IRRIGATION AND DEBRIDEMENT WITH POLY EXCHANGE performed by Manuel Allen MD at [address] Right 2/16/2021    RIGHT REMOVAL OF EXPLANT AND TOTAL KNEE REPLACEMENT [facility]    RIGHT    TOTAL KNEE ARTHROPLASTY Right 8/12/2020    RIGHT ARTHROPLASY  RESECTION WITH INSERTION OF SPACER performed by Marino Bailey MD at [place] [address]  6-7-2016    UPPER GASTROINTESTINAL ENDOSCOPY  04/02/2018       Social History     Socioeconomic History    Marital status:      Spouse name: Raquel Richards Number of children: 2    Years of education: Not on file    Highest education level: Not on file   Occupational History    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    Tobacco Use    Smoking status: Former Smoker     Packs/day: 0.50     Years: 25.00     Pack years: 12.50     Types: Cigarettes     Start date: 1985     Quit date: 2021     Years since quittin.7    Smokeless tobacco: Never Used    Tobacco comment: Patient vapes   Vaping Use    Vaping Use: Never used   Substance and Sexual Activity    Alcohol use: No     Alcohol/week: 0.0 standard drinks     Comment: last drink     Drug use: Yes     Types: Marijuana Sarai Yvette)     Comment: medical marjiuana card    Sexual activity: Yes     Partners: Female     Comment: wife Dennise Alcantar   Other Topics Concern    Not on file   Social History Narrative    Not on file     Social Determinants of Health     Financial Resource Strain:     Difficulty of Paying Living Expenses: Not on file   Food Insecurity:     Worried About Running Out of Food in the Last Year: Not on file    Steven of Food in the Last Year: Not on file   Transportation Needs:     Lack of Transportation (Medical): Not on file    Lack of Transportation (Non-Medical):  Not on file   Physical Activity:     Days of Exercise per Week: Not on file    Minutes of Exercise per Session: Not on file   Stress:     Feeling of Stress : Not on file   Social Connections:     Frequency of Communication with Friends and Family: Not on file    Frequency of Social Gatherings with Friends and Family: Not on file    Attends Christianity Services: Not on file    Active Member of [address] [place] [address] or Organizations: Not on file    Attends Club or Organization Meetings: Not on file    Marital Status: Not on file   Intimate Partner Violence:     Fear of Current or Ex-Partner: Not on file    Emotionally Abused: Not on file    Physically Abused: Not on file    Sexually Abused: Not on file   Housing Stability:     Unable to Pay for Housing in the Last Year: Not on file    Number of [place] in the Last Year: Not on file    Unstable Housing in the Last Year: Not on file       Family History   Problem Relation Age of Onset    Cancer Father         Lymphoma  Arthritis Mother     Heart Disease Maternal Uncle     Heart Disease Maternal Uncle     Diabetes Maternal Uncle        Current Outpatient Medications on File Prior to Visit   Medication Sig Dispense Refill    methocarbamol (ROBAXIN) 500 MG tablet Take 1 tablet by mouth 4 times daily as needed (muscle pain/spasm) 20 tablet 0    lidocaine (LIDODERM) 5 % Place 1 patch onto the skin daily for 10 days 12 hours on, 12 hours off. 10 patch 0    zoledronic acid (RECLAST) 5 MG/100ML SOLN Infuse 100 mLs intravenously once for 1 dose 100 mL 0    atorvastatin (LIPITOR) 40 MG tablet TAKE 1 TABLET BY MOUTH EVERYDAY AT BEDTIME 90 tablet 1    FLUoxetine (PROZAC) 20 MG capsule Take 1 capsule by mouth daily 90 capsule 1    traZODone (DESYREL) 100 MG tablet Take 2 tablets by mouth nightly 180 tablet 1    pantoprazole (PROTONIX) 40 MG tablet Take 1 tablet by mouth 2 times daily 180 tablet 1    clopidogrel (PLAVIX) 75 MG tablet Take 1 tablet by mouth daily 90 tablet 1    gabapentin (NEURONTIN) 600 MG tablet Take 600 mg by mouth 3 times daily.  sildenafil (REVATIO) 20 MG tablet Take 4 tablets by mouth as needed (30 min prior to intercourse) 30 tablet 0    calcium-vitamin D (OSCAL 500/200 D-3) 500-200 MG-UNIT per tablet Take 1 tablet by mouth 2 times daily       Multiple Vitamin TABS Take 1 tablet by mouth daily        No current facility-administered medications on file prior to visit. Pertinent items are noted in HPI  Review of systems reviewed from Patient History Form and available in the patient's chart under the Media tab. No change noted. PHYSICAL EXAMINATION:  Mr. Nelsy Landrum is a very pleasant 49 y.o.  male who presents today in no acute distress, awake, alert, and oriented. He is well dressed, nourished and  groomed. Patient with normal affect. Height is  6' (1.829 m), weight is 217 lb (98.4 kg), Body mass index is 29.43 kg/m². Resting respiratory rate is 16.      Examination of the gait, showed that the patient walks with a limp, PWB left leg using a cane. Examination of both lower extrimiteis showing a decreased range of motion of the left hip compare to the other side because of pain. There is no swelling that can be seen, or ecchymosis over the left hip. He  has intact sensation and good pedal pulses. He has significant tenderness on deep palpation over the left hip. IMAGING: Venus Redo were reviewed, dated 1/29/2022,  AP pelvis and 2 views of the left hip, and showed no fracture. IMPRESSION: Left hip pain/ hip contusion. PLAN:  I assured the patient that the xray is negative for acute fracture. I discussed with Wayne Harrisce treatment options, and that he can be WBAT. We discussed the risk of non displaced fracture. We will see him back in 4 weeks at which time we will get a new xray of the pelvis. The patient smokes, and we discussed with the patient the risks of smoking on general health and also on bone and soft tissue healing (delay and non-union), and promised to cut down or stop smoking. Smoking: Educated the patient regarding the hazards of smoking and that it harms their body in many ways. It increases the chance of developing heart disease, lung disease, cancer, and other health problems including poor bone and wound healing. The importance of smoking cessation for optimal bone and wound healing was stressed. This was communicated verbally, 5 Minutes.       Mariajose Chen MD

## 2022-02-16 ENCOUNTER — TELEPHONE (OUTPATIENT)
Dept: FAMILY MEDICINE CLINIC | Age: 52
End: 2022-02-16

## 2022-02-16 ENCOUNTER — HOSPITAL ENCOUNTER (EMERGENCY)
Age: 52
Discharge: HOME | End: 2022-02-17
Payer: COMMERCIAL

## 2022-02-16 ENCOUNTER — APPOINTMENT (OUTPATIENT)
Dept: GENERAL RADIOLOGY | Age: 52
End: 2022-02-16
Payer: COMMERCIAL

## 2022-02-16 ENCOUNTER — APPOINTMENT (OUTPATIENT)
Dept: CT IMAGING | Age: 52
End: 2022-02-16
Payer: COMMERCIAL

## 2022-02-16 VITALS
DIASTOLIC BLOOD PRESSURE: 74 MMHG | BODY MASS INDEX: 28.46 KG/M2 | TEMPERATURE: 99 F | WEIGHT: 210.1 LBS | OXYGEN SATURATION: 98 % | RESPIRATION RATE: 16 BRPM | HEART RATE: 54 BPM | HEIGHT: 72 IN | SYSTOLIC BLOOD PRESSURE: 115 MMHG

## 2022-02-16 DIAGNOSIS — K52.9 COLITIS: ICD-10-CM

## 2022-02-16 DIAGNOSIS — R10.84 GENERALIZED ABDOMINAL PAIN: Primary | ICD-10-CM

## 2022-02-16 LAB
A/G RATIO: 1.7 (ref 1.1–2.2)
ALBUMIN SERPL-MCNC: 4 G/DL (ref 3.4–5)
ALP BLD-CCNC: 91 U/L (ref 40–129)
ALT SERPL-CCNC: 12 U/L (ref 10–40)
ANION GAP SERPL CALCULATED.3IONS-SCNC: 11 MMOL/L (ref 3–16)
AST SERPL-CCNC: 15 U/L (ref 15–37)
BASOPHILS ABSOLUTE: 0.1 K/UL (ref 0–0.2)
BASOPHILS RELATIVE PERCENT: 1.2 %
BILIRUB SERPL-MCNC: 0.3 MG/DL (ref 0–1)
BILIRUBIN URINE: NEGATIVE
BLOOD, URINE: NEGATIVE
BUN BLDV-MCNC: 7 MG/DL (ref 7–20)
CALCIUM SERPL-MCNC: 8.5 MG/DL (ref 8.3–10.6)
CHLORIDE BLD-SCNC: 104 MMOL/L (ref 99–110)
CLARITY: CLEAR
CO2: 25 MMOL/L (ref 21–32)
COLOR: YELLOW
CREAT SERPL-MCNC: 1 MG/DL (ref 0.9–1.3)
EOSINOPHILS ABSOLUTE: 0.1 K/UL (ref 0–0.6)
EOSINOPHILS RELATIVE PERCENT: 3.1 %
GFR AFRICAN AMERICAN: >60
GFR NON-AFRICAN AMERICAN: >60
GLUCOSE BLD-MCNC: 85 MG/DL (ref 70–99)
GLUCOSE URINE: NEGATIVE MG/DL
HCT VFR BLD CALC: 41 % (ref 40.5–52.5)
HEMOGLOBIN: 13.4 G/DL (ref 13.5–17.5)
KETONES, URINE: NEGATIVE MG/DL
LACTIC ACID: 0.5 MMOL/L (ref 0.4–2)
LEUKOCYTE ESTERASE, URINE: NEGATIVE
LIPASE: 67 U/L (ref 13–60)
LYMPHOCYTES ABSOLUTE: 2 K/UL (ref 1–5.1)
LYMPHOCYTES RELATIVE PERCENT: 48.5 %
MCH RBC QN AUTO: 29.3 PG (ref 26–34)
MCHC RBC AUTO-ENTMCNC: 32.7 G/DL (ref 31–36)
MCV RBC AUTO: 89.4 FL (ref 80–100)
MICROSCOPIC EXAMINATION: NORMAL
MONOCYTES ABSOLUTE: 0.6 K/UL (ref 0–1.3)
MONOCYTES RELATIVE PERCENT: 13.9 %
NEUTROPHILS ABSOLUTE: 1.4 K/UL (ref 1.7–7.7)
NEUTROPHILS RELATIVE PERCENT: 33.3 %
NITRITE, URINE: NEGATIVE
PDW BLD-RTO: 15.4 % (ref 12.4–15.4)
PH UA: 5.5 (ref 5–8)
PLATELET # BLD: 243 K/UL (ref 135–450)
PMV BLD AUTO: 8.2 FL (ref 5–10.5)
POTASSIUM REFLEX MAGNESIUM: 4.3 MMOL/L (ref 3.5–5.1)
PROTEIN UA: NEGATIVE MG/DL
RBC # BLD: 4.58 M/UL (ref 4.2–5.9)
SODIUM BLD-SCNC: 140 MMOL/L (ref 136–145)
SPECIFIC GRAVITY UA: 1.01 (ref 1–1.03)
TOTAL PROTEIN: 6.4 G/DL (ref 6.4–8.2)
URINE REFLEX TO CULTURE: NORMAL
URINE TYPE: NORMAL
UROBILINOGEN, URINE: 0.2 E.U./DL
WBC # BLD: 4.1 K/UL (ref 4–11)

## 2022-02-16 PROCEDURE — 6370000000 HC RX 637 (ALT 250 FOR IP): Performed by: PHYSICIAN ASSISTANT

## 2022-02-16 PROCEDURE — 83605 ASSAY OF LACTIC ACID: CPT

## 2022-02-16 PROCEDURE — 96374 THER/PROPH/DIAG INJ IV PUSH: CPT

## 2022-02-16 PROCEDURE — 71045 X-RAY EXAM CHEST 1 VIEW: CPT

## 2022-02-16 PROCEDURE — 6360000004 HC RX CONTRAST MEDICATION: Performed by: PHYSICIAN ASSISTANT

## 2022-02-16 PROCEDURE — 83690 ASSAY OF LIPASE: CPT

## 2022-02-16 PROCEDURE — 99284 EMERGENCY DEPT VISIT MOD MDM: CPT

## 2022-02-16 PROCEDURE — 85025 COMPLETE CBC W/AUTO DIFF WBC: CPT

## 2022-02-16 PROCEDURE — 6360000002 HC RX W HCPCS: Performed by: PHYSICIAN ASSISTANT

## 2022-02-16 PROCEDURE — 74177 CT ABD & PELVIS W/CONTRAST: CPT

## 2022-02-16 PROCEDURE — 2500000003 HC RX 250 WO HCPCS: Performed by: PHYSICIAN ASSISTANT

## 2022-02-16 PROCEDURE — 36415 COLL VENOUS BLD VENIPUNCTURE: CPT

## 2022-02-16 PROCEDURE — 96375 TX/PRO/DX INJ NEW DRUG ADDON: CPT

## 2022-02-16 PROCEDURE — 81003 URINALYSIS AUTO W/O SCOPE: CPT

## 2022-02-16 PROCEDURE — 80053 COMPREHEN METABOLIC PANEL: CPT

## 2022-02-16 RX ORDER — 0.9 % SODIUM CHLORIDE 0.9 %
1000 INTRAVENOUS SOLUTION INTRAVENOUS ONCE
Status: DISCONTINUED | OUTPATIENT
Start: 2022-02-16 | End: 2022-02-17 | Stop reason: HOSPADM

## 2022-02-16 RX ORDER — OXYCODONE HYDROCHLORIDE AND ACETAMINOPHEN 5; 325 MG/1; MG/1
1 TABLET ORAL ONCE
Status: COMPLETED | OUTPATIENT
Start: 2022-02-16 | End: 2022-02-16

## 2022-02-16 RX ORDER — AMOXICILLIN AND CLAVULANATE POTASSIUM 875; 125 MG/1; MG/1
1 TABLET, FILM COATED ORAL 2 TIMES DAILY
Qty: 14 TABLET | Refills: 0 | Status: SHIPPED | OUTPATIENT
Start: 2022-02-16 | End: 2022-02-23

## 2022-02-16 RX ORDER — DICYCLOMINE HYDROCHLORIDE 10 MG/1
20 CAPSULE ORAL ONCE
Status: COMPLETED | OUTPATIENT
Start: 2022-02-16 | End: 2022-02-16

## 2022-02-16 RX ADMIN — Medication 1000 ML: at 21:37

## 2022-02-16 RX ADMIN — OXYCODONE HYDROCHLORIDE AND ACETAMINOPHEN 1 TABLET: 5; 325 TABLET ORAL at 23:36

## 2022-02-16 RX ADMIN — HYDROMORPHONE HYDROCHLORIDE 0.5 MG: 1 INJECTION, SOLUTION INTRAMUSCULAR; INTRAVENOUS; SUBCUTANEOUS at 21:34

## 2022-02-16 RX ADMIN — FAMOTIDINE 20 MG: 10 INJECTION, SOLUTION INTRAVENOUS at 21:37

## 2022-02-16 RX ADMIN — IOPAMIDOL 75 ML: 755 INJECTION, SOLUTION INTRAVENOUS at 22:25

## 2022-02-16 RX ADMIN — DICYCLOMINE HYDROCHLORIDE 20 MG: 10 CAPSULE ORAL at 23:35

## 2022-02-16 ASSESSMENT — PAIN SCALES - GENERAL
PAINLEVEL_OUTOF10: 8
PAINLEVEL_OUTOF10: 7
PAINLEVEL_OUTOF10: 7

## 2022-02-16 ASSESSMENT — PAIN DESCRIPTION - DESCRIPTORS: DESCRIPTORS: ACHING

## 2022-02-16 ASSESSMENT — PAIN DESCRIPTION - LOCATION: LOCATION: ABDOMEN

## 2022-02-16 ASSESSMENT — PAIN DESCRIPTION - ORIENTATION: ORIENTATION: MID

## 2022-02-16 ASSESSMENT — PAIN DESCRIPTION - ONSET: ONSET: ON-GOING

## 2022-02-16 ASSESSMENT — PAIN - FUNCTIONAL ASSESSMENT: PAIN_FUNCTIONAL_ASSESSMENT: PREVENTS OR INTERFERES SOME ACTIVE ACTIVITIES AND ADLS

## 2022-02-16 ASSESSMENT — PAIN DESCRIPTION - PAIN TYPE: TYPE: ACUTE PAIN

## 2022-02-16 ASSESSMENT — PAIN DESCRIPTION - PROGRESSION: CLINICAL_PROGRESSION: NOT CHANGED

## 2022-02-16 ASSESSMENT — PAIN DESCRIPTION - FREQUENCY: FREQUENCY: CONTINUOUS

## 2022-02-16 NOTE — TELEPHONE ENCOUNTER
He will call back if needs to come in here. Will check with Dr. Mark Anguiano. Per Dr. Leda Lucero, he can check with Dr. Mark Anguiano first, or see us. This is not an ER visit at this time.

## 2022-02-16 NOTE — TELEPHONE ENCOUNTER
Patient could not get in to see the GI DrJany So I scheduled him for tomorrow with Dr. Panchito Goldsmith

## 2022-02-16 NOTE — TELEPHONE ENCOUNTER
Patient called in stating that he has had diarrhea since Sunday night. It has been a gray color with bubbles. He has had a little bit of nauseousness. No fever & no other symptoms. He has been taking peptobismol. He has been vaccinated. He has been having diarrhea once or twice an hour. After talking to Gloria she advised him to stop taking the pepto and start taking imodium. She advised him to call Dr. Florina Angel to see if he could see him today. If not he could see Dr. Batool Polo tomorrow. Patient will call back if he needs to.     INOCENTE

## 2022-02-17 ENCOUNTER — OFFICE VISIT (OUTPATIENT)
Dept: FAMILY MEDICINE CLINIC | Age: 52
End: 2022-02-17
Payer: COMMERCIAL

## 2022-02-17 VITALS
WEIGHT: 208 LBS | DIASTOLIC BLOOD PRESSURE: 70 MMHG | SYSTOLIC BLOOD PRESSURE: 104 MMHG | OXYGEN SATURATION: 98 % | BODY MASS INDEX: 28.17 KG/M2 | HEIGHT: 72 IN | HEART RATE: 54 BPM

## 2022-02-17 DIAGNOSIS — K52.9 ACUTE COLITIS: Primary | ICD-10-CM

## 2022-02-17 PROCEDURE — 99214 OFFICE O/P EST MOD 30 MIN: CPT | Performed by: FAMILY MEDICINE

## 2022-02-17 RX ORDER — DIPHENOXYLATE HYDROCHLORIDE AND ATROPINE SULFATE 2.5; .025 MG/1; MG/1
1 TABLET ORAL 4 TIMES DAILY PRN
Qty: 40 TABLET | Refills: 0 | Status: SHIPPED | OUTPATIENT
Start: 2022-02-17 | End: 2022-02-27

## 2022-02-17 RX ORDER — PREDNISONE 20 MG/1
40 TABLET ORAL DAILY
Qty: 14 TABLET | Refills: 0 | Status: SHIPPED | OUTPATIENT
Start: 2022-02-17 | End: 2022-02-24

## 2022-02-17 NOTE — ED PROVIDER NOTES
[address] [facility]        Pt Name: Tangela Crooks  MRN: [id number]  [place] 1970  Date of evaluation: 2/16/2022  Provider: JEMAL Pemberton  PCP: Margarita Craig MD  Note Started: 8:53 PM EST     The ED Attending Physician was available for consultation but did not see or evaluate this patient. CHIEF COMPLAINT       Chief Complaint   Patient presents with    Abdominal Pain     Pt to ED with c/o mid abd pain and diarrhea, symptoms started 3 days ago       HISTORY OF PRESENT ILLNESS   (Location, Timing/Onset, Context/Setting, Quality, Duration, Modifying Factors, Severity, Associated Signs and Symptoms)  Note limiting factors. Chief Complaint: Abdominal pain, diarrhea, loss of appetite    Tangela Crooks is a 49 y.o. male who presents with the above complaints. Says he began about 3 nights ago, have been persistent since then. Past history of gastric bypass and lysis of multiple adhesions, and he was hospitalized here a couple of months ago for small bowel obstruction that resolved without surgery. Has had multiple visits in this ED over recent years for his abdominal problems. He says that the pain he is feeling now does not feel like an obstruction, as it is higher up in the abdomen, across the upper abdomen but especially in the middle. He denies any nausea or vomiting, says she just could not eat anything today. Pain does feel somewhat gaseous. Says it rises up the midline but otherwise he denies any chest pain. Denies cough or shortness of breath. Denies any recent trauma. No urinary complaints. He says his bowel movements for the last few days have been watery and mucousy, almost completely liquid, nonbloody. Nursing Notes were all reviewed and agreed with or any disagreements were addressed in the HPI.     REVIEW OF SYSTEMS    (2-9 systems for level 4, 10 or more for level 5)     Review of Systems    Positives and pertinent negatives as per HPI. PAST MEDICAL HISTORY     Past Medical History:   Diagnosis Date    Alcoholism [facility])     last drink 2015    Allergic migraine with status migrainosus     Allergic rhinitis, seasonal     Anemia     Arthritis     right knee    Vaughn's esophagus     Benign intracranial hypertension     Cancer (HCC)     renal     Carpal tunnel syndrome     Chronic pain     Depression     GERD (gastroesophageal reflux disease)     Headache(784.0)     History of blood transfusion     History of tobacco use     Quit 7/2014    Migraine     chronic for 1 year    Morbid obesity ([address]     Movement disorder     Onychomycosis     Sleep apnea, obstructive     Severe uses cpap stop bang 6    Unspecified cerebral artery occlusion with cerebral infarction 2014    slight weakness in left arm    Use of cane as ambulatory aid     Wears dentures     full set    Wears glasses        SURGICAL HISTORY     Past Surgical History:   Procedure Laterality Date    ABDOMEN SURGERY      gastric bypass    ABDOMEN SURGERY  4-7-2016    repair of recurrent incisional hernia with mesh, removal of old mesh, bilateral component separation    ABDOMINAL EXPLORATION SURGERY  1/11/16    exp lap, lysis of adhesions, small bowel resection    CARPAL TUNNEL RELEASE      bilat    DILATATION, ESOPHAGUS      ENDOSCOPY, COLON, DIAGNOSTIC      EYE SURGERY      GASTRIC BYPASS SURGERY  Jan 2009    Has lost about 200 pounds.     HERNIA REPAIR  3-    ventral    JOINT REPLACEMENT      KIDNEY REMOVAL Left 08/01/2018    KNEE ARTHROSCOPY Right 7/11/2013    Dr.Robert Sanders     KNEE ARTHROSCOPY Right 7/11/12    RIGHT KNEE ARTHROSCOPY WITH CHONDROPLASTY    KNEE SURGERY  July 2012    right, arthroscopy    KNEE SURGERY Right 2/18/2020    INCISION AND DRAINAGE RIGHT KNEE AND WOULD VAC PLACEMENT performed by Jaya Schultz MD at [address]  Right 2/26/2021    INCISION AND DRAINAGE OF RIGHT KNEE HEMATOMA performed by Bartolo Riggins MD at [address] Right 3/5/2021    INCISION AND DRAINAGE AND CLOSURE RIGHT TOTAL KNEE performed by Bartolo Riggins MD at [address]  2005    OTHER SURGICAL HISTORY Left 03/08/2016    CT biopsy ablasion left kidney    OTHER SURGICAL HISTORY  2016    small intestine 12 inch removed, 2 hernia surgeries, another surgery to drain infection from incision.  REVISION TOTAL KNEE ARTHROPLASTY Right 12/17/2019    RIGHT REVISION TIBIA TOTAL KNEE REPLACEMENT performed by Reyes Chavez MD at [address] [facility] Right 1/22/2020    RIGHT KNEE IRRIGATION AND DEBRIDEMENT WITH POLY EXCHANGE performed by Hal Joseph MD at [address] [facility] Right 2/16/2021    RIGHT REMOVAL OF EXPLANT AND TOTAL KNEE REPLACEMENT performed by Bartolo Riggins MD at [address] [facility]  10/15/13    RIGHT    TOTAL KNEE ARTHROPLASTY Right 8/12/2020    RIGHT ARTHROPLASY  RESECTION WITH INSERTION OF SPACER performed by Reyes Chavez MD at [address] [facility] [address]  6-7-2016    UPPER GASTROINTESTINAL ENDOSCOPY  04/02/2018       CURRENTMEDICATIONS       Previous Medications    ATORVASTATIN (LIPITOR) 40 MG TABLET    TAKE 1 TABLET BY MOUTH EVERYDAY AT BEDTIME    CALCIUM-VITAMIN D (OSCAL 500/200 D-3) 500-200 MG-UNIT PER TABLET    Take 1 tablet by mouth 2 times daily     CLOPIDOGREL (PLAVIX) 75 MG TABLET    Take 1 tablet by mouth daily    FLUOXETINE (PROZAC) 20 MG CAPSULE    Take 1 capsule by mouth daily    GABAPENTIN (NEURONTIN) 600 MG TABLET    Take 600 mg by mouth 3 times daily.     MULTIPLE VITAMIN TABS    Take 1 tablet by mouth daily     PANTOPRAZOLE (PROTONIX) 40 MG TABLET    Take 1 tablet by mouth 2 times daily    SILDENAFIL (REVATIO) 20 MG TABLET    Take 4 tablets by mouth as needed (30 min prior to intercourse)    TRAZODONE (DESYREL) 100 MG TABLET    Take 2 tablets by mouth nightly    ZOLEDRONIC ACID (RECLAST) 5 MG/100ML SOLN    Infuse 100 mLs intravenously once for 1 dose       ALLERGIES     Nsaids, Morphine, Prochlorperazine, Valtrex [valacyclovir hcl], Fentanyl, Morphine and related, and Tolmetin    FAMILYHISTORY       Family History   Problem Relation Age of Onset    Cancer Father         Lymphoma    Arthritis Mother     Heart Disease Maternal Uncle     Heart Disease Maternal Uncle     Diabetes Maternal Uncle         SOCIAL HISTORY       Social History     Tobacco Use    Smoking status: Former Smoker     Packs/day: 0.50     Years: 25.00     Pack years: 12.50     Types: Cigarettes     Start date: 1985     Quit date: 2021     Years since quittin.7    Smokeless tobacco: Never Used    Tobacco comment: Patient vapes   Vaping Use    Vaping Use: Never used   Substance Use Topics    Alcohol use: No     Alcohol/week: 0.0 standard drinks     Comment: last drink     Drug use: Yes     Types: Marijuana (Weed)     Comment: medical luke card       SCREENINGS    [place] Coma Scale  Eye Opening: Spontaneous  Best Verbal Response: Oriented  Best Motor Response: Obeys commands  [place] Coma Scale Score: 15      PHYSICAL EXAM    (up to 7 for level 4, 8 or more for level 5)     ED Triage Vitals [22]   BP Temp Temp Source Pulse Resp SpO2 Height Weight   115/74 99 °F (37.2 °C) Oral 54 16 98 % 6' (1.829 m) 210 lb 1.6 oz (95.3 kg)       Physical Exam  Vitals and nursing note reviewed. Constitutional:       General: He is not in acute distress. Appearance: Normal appearance. He is not ill-appearing. HENT:      Head: Normocephalic and atraumatic. Nose: Nose normal.   Eyes:      General:         Right eye: No discharge. Left eye: No discharge. Cardiovascular:      Rate and Rhythm: Normal rate and regular rhythm. Heart sounds: Normal heart sounds. No murmur heard. No gallop.     Pulmonary:      Effort: Pulmonary effort is normal. No respiratory distress. Breath sounds: Normal breath sounds. No stridor. No wheezing, rhonchi or rales. Abdominal:      General: Bowel sounds are normal. There is no distension. Palpations: Abdomen is soft. There is no mass. Tenderness: There is abdominal tenderness in the right upper quadrant, epigastric area, periumbilical area and left upper quadrant. There is no guarding or rebound. Musculoskeletal:         General: Normal range of motion. Cervical back: Normal range of motion. Skin:     General: Skin is warm and dry. Neurological:      General: No focal deficit present. Mental Status: He is alert and oriented to person, place, and time.    Psychiatric:         Mood and Affect: Mood normal.         Behavior: Behavior normal.         DIAGNOSTIC RESULTS   LABS:    Labs Reviewed   CBC WITH AUTO DIFFERENTIAL - Abnormal; Notable for the following components:       Result Value    Hemoglobin 13.4 (*)     Neutrophils Absolute 1.4 (*)     All other components within normal limits    Narrative:     Performed at:  [place] [address]   Phone [phone]   LIPASE - Abnormal; Notable for the following components:    Lipase 67.0 (*)     All other components within normal limits    Narrative:     Performed at:  [place] [address]   Phone [phone]   COMPREHENSIVE METABOLIC PANEL W/ REFLEX TO MG FOR LOW K    Narrative:     Performed at:  [place] [address]   Phone [phone]   URINALYSIS WITH REFLEX TO CULTURE    Narrative:     Performed at:  [place] [address]   Phone [phone]   LACTIC ACID    Narrative:     Performed at:  [facility]  [address] [place] [address], [place] [address]   Phone [phone]       When ordered only abnormal lab results are displayed. All other labs were within normal range or not returned as of this dictation. EKG: When ordered, EKG's are interpreted by the Emergency Department Physician in the absence of a cardiologist.  Please see their note for interpretation of EKG. RADIOLOGY:   Non-plain film images such as CT, Ultrasound and MRI are read by the radiologist. Plain radiographic images are visualized and preliminarily interpreted by the ED Provider with the below findings:    Interpretation per the Radiologist below, if available at the time of this note:    CT ABDOMEN PELVIS W IV CONTRAST Additional Contrast? None   Final Result   1. Wall thickening of the rectum. Correlate with presentation to exclude   infection or inflammation. 2. No appendicitis, diverticulitis, or small bowel obstruction. 3. Other findings as described. XR CHEST PORTABLE   Final Result   Interstitial reticulation and hazy opacities at the left base more than   right. Could relate to subsegmental atelectasis or pneumonitis. CONSULTS:  None    PROCEDURES   Unless otherwise noted below, none.      Procedures    EMERGENCY DEPARTMENT COURSE and DIFFERENTIAL DIAGNOSIS/MDM:   Vitals:    Vitals:    02/16/22 1945   BP: 115/74   Pulse: 54   Resp: 16   Temp: 99 °F (37.2 °C)   TempSrc: Oral   SpO2: 98%   Weight: 210 lb 1.6 oz (95.3 kg)   Height: 6' (1.829 m)       Patient was given the following medications:  Medications   0.9 % sodium chloride bolus (1,000 mLs IntraVENous Bolus from Bag 2/16/22 2137)   oxyCODONE-acetaminophen (PERCOCET) 5-325 MG per tablet 1 tablet (has no administration in time range)   dicyclomine (BENTYL) capsule 20 mg (has no administration in time range)   HYDROmorphone (DILAUDID) injection 0.5 mg (0.5 mg IntraVENous Given 2/16/22 2134)   famotidine (PEPCID) injection 20 mg (20 mg IntraVENous Given 2/16/22 2137)   iopamidol (ISOVUE-370) 76 % injection 75 mL (75 mLs IntraVENous Given 2/16/22 4039)           Patient has a long history of chronic abdominal problems. His lab work-up today showed no concerning abnormalities, but given his history and no obvious cause, along with a recent obstruction, CT scan the abdomen pelvis with IV contrast was performed. This showed no acute findings other than some colonic wall thickening. There is no indication for hospitalization. Patient will be discharged with a prescription for a course of Augmentin to cover for infectious process, and the patient says he has an appointment with his doctor tomorrow. The patient verbalized understanding and agreement with this plan of care. The patient was advised to return to the emergency department if symptoms should significantly worsen or if new and concerning symptoms should appear. I estimate there is LOW risk for ACUTE APPENDICITIS, BOWEL OBSTRUCTION, CHOLECYSTITIS, DIVERTICULITIS, INCARCERATED HERNIA, PANCREATITIS, PERFORATED BOWEL, BOWEL ISCHEMIA, PERITONITIS, SEPSIS, or CARDIAC ISCHEMIA, thus I consider the discharge disposition reasonable. CRITICAL CARE TIME   None    FINAL IMPRESSION      1. Generalized abdominal pain    2.  Colitis          DISPOSITION/PLAN   DISPOSITION Decision To Discharge 02/16/2022 11:18:50 PM      PATIENT REFERRED TO:  Shwetha Canela MD  [address] [place] [address]  [phone]    Go in 1 day  Keep your scheduled appointment      DISCHARGE MEDICATIONS:  New Prescriptions    AMOXICILLIN-CLAVULANATE (AUGMENTIN) 875-125 MG PER TABLET    Take 1 tablet by mouth 2 times daily for 7 days       DISCONTINUED MEDICATIONS:  Discontinued Medications    No medications on file            (Please note that portions of this note were completed with a voice recognition program.  Efforts were made to edit the dictations but occasionally words are mis-transcribed.)    JEMAL Kim (electronically signed)        Darshana Rojas [place]  02/16/22 4102

## 2022-02-17 NOTE — PROGRESS NOTES
2022    Blood pressure 104/70, pulse 54, height 6' (1.829 m), weight 208 lb (94.3 kg), SpO2 98 %. Pradeep Pittman (:  1970) is a 49 y.o. male, here for evaluation of the following medical concerns:    Chief Complaint   Patient presents with    Diarrhea     since  afternoon, goes 2 times per hour     Here for foamy, grey, mucousy watery diarrhea for past 5 days. Every hour on average. Sudden onset. No n/v  No blood. No f/c  Has hx of numerous obstructions and adhesions in past.  He has chornic abd pain, gets ketamine infusions. Last infusion was ; this does not feel like his usual abd pain. No fevers/chills. He has not been eating well, but does not believe his symptoms worsen after eating.    + intense abd pain that prompted him to go to ER yesterday. Dx with colitis (rectal wall thickening was noted). He was to start augmentin, but has not started it yet. Was using pepto until yesterday when he changed to imodium AD. Bentyl in ED did not help    Amina has never had a colonoscopy    Today he reports he is feeling tired. Last BM was 1:30 pm (same watery BMs), high volume. Like 'dumping a bucket of water in the toilet'    Last renal function test:   Lab Results   Component Value Date     2022    K 4.3 2022    BUN 7 2022    CREATININE 1.0 2022     Estimated Creatinine Clearance: 105 mL/min (based on SCr of 1 mg/dL). 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     Chronic pain syndrome- abdominal and head    History of stroke: right insular cortex on 2014 (small PFO; hypergoag w/u neg, [facility] neurology)    Gastroesophageal reflux disease with esophagitis--on daily ppi    Intractable chronic migraine without aura and with status migrainosus    Iron deficiency anemia    B12 deficiency    Malignant neoplasm of left kidney (HCC) clear cell. 8/1/18 Dr Denver Uribe.  History of depression    History of alcoholism ([facility] [address]    History of total knee arthroplasty, right    Hematoma of right knee region    TIA (transient ischemic attack) LUE weakness 3/21    Tobacco use    Acute deep vein thrombosis (DVT) of calf muscle vein of both lower extremities (HCC)    Closed compression fracture of body of L1 vertebra (HCC)    Osteopenia of multiple sites    Small bowel obstruction (HCC)    Contusion of left hip    Current smoker        Body mass index is 28.21 kg/m². Wt Readings from Last 3 Encounters:   02/17/22 208 lb (94.3 kg)   02/16/22 210 lb 1.6 oz (95.3 kg)   02/02/22 217 lb (98.4 kg)       BP Readings from Last 3 Encounters:   02/17/22 104/70   02/16/22 115/74   01/29/22 136/86       Allergies   Allergen Reactions    Nsaids Nausea Only and Other (See Comments)     Hx of Barretts esophagus      Morphine Hives and Itching     Pt gets Hives/itching. Does not tolerate     Prochlorperazine Other (See Comments)     No allergic reaction, patient reported sense of \"restlessness\" and fidgiting    Valtrex [Valacyclovir Hcl] Diarrhea    Fentanyl Itching    Morphine And Related Hives and Itching    Tolmetin Nausea Only     Hx of Barretts esophagus       Prior to Visit Medications    Medication Sig Taking?  Authorizing Provider   atorvastatin (LIPITOR) 40 MG tablet TAKE 1 TABLET BY MOUTH EVERYDAY AT BEDTIME Yes Shwetha Canela MD   FLUoxetine (PROZAC) 20 MG capsule Take 1 capsule by mouth daily Yes Haja Marquez MD   traZODone (DESYREL) 100 MG tablet Take 2 tablets by mouth nightly Yes Haja Marquez MD   pantoprazole (PROTONIX) 40 MG tablet Take 1 tablet by mouth 2 times daily Yes Archie Reese Belen Giles MD   clopidogrel (PLAVIX) 75 MG tablet Take 1 tablet by mouth daily Yes Chelly Lopes MD   gabapentin (NEURONTIN) 600 MG tablet Take 600 mg by mouth 3 times daily. Yes Historical Provider, MD   sildenafil (REVATIO) 20 MG tablet Take 4 tablets by mouth as needed (30 min prior to intercourse) Yes Hood Renner MD   calcium-vitamin D (OSCAL 500/200 D-3) 500-200 MG-UNIT per tablet Take 1 tablet by mouth 2 times daily  Yes Historical Provider, MD   Multiple Vitamin TABS Take 1 tablet by mouth daily  Yes Historical Provider, MD   amoxicillin-clavulanate (AUGMENTIN) 875-125 MG per tablet Take 1 tablet by mouth 2 times daily for 7 days  Patient not taking: Reported on 2022  JEMAL Alexandra   zoledronic acid (RECLAST) 5 MG/100ML SOLN Infuse 100 mLs intravenously once for 1 dose  Hood Renner MD        Social History     Tobacco Use    Smoking status: Former Smoker     Packs/day: 0.50     Years: 25.00     Pack years: 12.50     Types: Cigarettes     Start date: 1985     Quit date: 2021     Years since quittin.8    Smokeless tobacco: Never Used    Tobacco comment: Patient vapes   Vaping Use    Vaping Use: Never used   Substance Use Topics    Alcohol use: No     Alcohol/week: 0.0 standard drinks     Comment: last drink     Drug use: Yes     Types: Marijuana Cookie Crowe)     Comment: medical [facility] card       Review of Systems    Physical Exam  Vitals and nursing note reviewed. Constitutional:       General: He is not in acute distress. Appearance: Normal appearance. He is well-developed. He is not diaphoretic. Cardiovascular:      Rate and Rhythm: Normal rate and regular rhythm. Pulses: Normal pulses. Heart sounds: Normal heart sounds. No murmur heard. Pulmonary:      Effort: Pulmonary effort is normal. No respiratory distress. Breath sounds: Normal breath sounds. No wheezing or rales. Abdominal:      General: Abdomen is flat.       Palpations: Abdomen is soft. Comments: Mild diffuse tenderness, hyperactive bowel sounds. Musculoskeletal:      Cervical back: Normal range of motion. Right lower leg: No edema. Left lower leg: No edema. Skin:     General: Skin is warm and dry. Neurological:      General: No focal deficit present. Mental Status: He is alert and oriented to person, place, and time. Mental status is at baseline. Psychiatric:         Mood and Affect: Mood normal.         Behavior: Behavior normal.         Thought Content: Thought content normal.         Judgment: Judgment normal.         ASSESSMENT/PLAN:    1. Acute colitis  - continue augmentin from ER. Lomotil for symptom control. Needs to see gi in follow up for colonoscopy, both for screening as well as discernment of colitis cause.  - aggressive oral rehydration- discussed using WHO oral rehydration formula. - diphenoxylate-atropine (DIPHENATOL) 2.5-0.025 MG per tablet; Take 1 tablet by mouth 4 times daily as needed for Diarrhea for up to 10 days. Dispense: 40 tablet; Refill: 0  - [facility] - Marianna Murguia DO, Gastroenterology, [facility]    Has appt with DR Del Arnold 3/14/22    Return if symptoms worsen or fail to improve. An  Subblimeignature was used to authenticate this note.     --Elyssa Castillo MD on 2/17/2022 at 2:50 PM

## 2022-02-17 NOTE — ED NOTES
Pt confirmed d/c paperwork have correct name. Discharge and education instructions reviewed with patient. Teach-back successful. Pt verbalized understanding and signed d/c papers. Pt denied questions at this time. No acute distress noted. Patient instructed to follow-up as noted - return to emergency department if symptoms worsen. Patient verbalized understanding. Discharged per EDMD with discharge instructions. Pt discharged to private vehicle. Patient stable upon departure.  Thanked patient for choosing [facility]) for care.     '         Eusebio Bedolla RN  02/17/22 0010

## 2022-02-18 ENCOUNTER — CARE COORDINATION (OUTPATIENT)
Dept: CARE COORDINATION | Age: 52
End: 2022-02-18

## 2022-02-18 NOTE — CARE COORDINATION
Educated patient about risk for severe COVID-19 due to risk factors according to CDC guidelines. ACM reviewed discharge instructions, medical action plan and red flag symptoms with the patient who verbalized understanding. Discussed COVID vaccination status: Yes. Education provided on COVID-19 vaccination as appropriate. Discussed exposure protocols and quarantine with CDC Guidelines. Patient was given an opportunity to verbalize any questions and concerns and agrees to contact ACM or health care provider for questions related to their healthcare. Patient denies need for Ambulatory Care Management at this time but states he has this ACM's contact information and will reach out for needs.

## 2022-02-25 ENCOUNTER — CARE COORDINATION (OUTPATIENT)
Dept: CARE COORDINATION | Age: 52
End: 2022-02-25

## 2022-03-02 ENCOUNTER — OFFICE VISIT (OUTPATIENT)
Dept: ORTHOPEDIC SURGERY | Age: 52
End: 2022-03-02
Payer: COMMERCIAL

## 2022-03-02 VITALS — BODY MASS INDEX: 28.17 KG/M2 | RESPIRATION RATE: 15 BRPM | WEIGHT: 208 LBS | HEIGHT: 72 IN

## 2022-03-02 DIAGNOSIS — S70.02XA CONTUSION OF LEFT HIP, INITIAL ENCOUNTER: Primary | ICD-10-CM

## 2022-03-02 DIAGNOSIS — F17.200 CURRENT SMOKER: ICD-10-CM

## 2022-03-02 PROCEDURE — 99406 BEHAV CHNG SMOKING 3-10 MIN: CPT | Performed by: ORTHOPAEDIC SURGERY

## 2022-03-02 PROCEDURE — 99213 OFFICE O/P EST LOW 20 MIN: CPT | Performed by: ORTHOPAEDIC SURGERY

## 2022-03-06 NOTE — PROGRESS NOTES
CHIEF COMPLAINT: Left hip pain/ hip contusion. DATE OF INJURY:  1/29/2022    Mr. Иван David 49 y.o.  male presents today for f/u evaluation of a left hip pain which occurred when he fell on ice. He reported no more left hip pain, 0/10. No numbness or tingling sensation. No other complaint. He was seen 1st at [place], where he was x-rayed and asked to f/u with Orthopedic. Past Medical History:   Diagnosis Date    Alcoholism [facility])     last drink 2015    Allergic migraine with status migrainosus     Allergic rhinitis, seasonal     Anemia     Arthritis     right knee    Vaughn's esophagus     Benign intracranial hypertension     Cancer (HCC)     renal     Carpal tunnel syndrome     Chronic pain     Depression     GERD (gastroesophageal reflux disease)     Headache(784.0)     History of blood transfusion     History of tobacco use     Quit 7/2014    Migraine     chronic for 1 year    Morbid obesity ([address]     Movement disorder     Onychomycosis     Sleep apnea, obstructive     Severe uses cpap stop bang 6    Unspecified cerebral artery occlusion with cerebral infarction 2014    slight weakness in left arm    Use of cane as ambulatory aid     Wears dentures     full set    Wears glasses        Past Surgical History:   Procedure Laterality Date    ABDOMEN SURGERY      gastric bypass    ABDOMEN SURGERY  4-7-2016    repair of recurrent incisional hernia with mesh, removal of old mesh, bilateral component separation    ABDOMINAL EXPLORATION SURGERY  1/11/16    exp lap, lysis of adhesions, small bowel resection    CARPAL TUNNEL RELEASE      bilat    DILATATION, ESOPHAGUS      ENDOSCOPY, COLON, DIAGNOSTIC      EYE SURGERY      GASTRIC BYPASS SURGERY  Jan 2009    Has lost about 200 pounds.     HERNIA REPAIR  3-    ventral    JOINT REPLACEMENT      KIDNEY REMOVAL Left 08/01/2018    KNEE ARTHROSCOPY Right 7/11/2013    Dr.Robert Sanders     KNEE ARTHROSCOPY Right 7/11/12 RIGHT KNEE ARTHROSCOPY WITH CHONDROPLASTY    KNEE SURGERY  July 2012    right, arthroscopy    KNEE SURGERY Right 2/18/2020    INCISION AND DRAINAGE RIGHT KNEE AND WOULD VAC PLACEMENT performed by Moo Barber MD at [address] Right 2/26/2021    INCISION AND DRAINAGE OF RIGHT KNEE HEMATOMA performed by Anton Burger MD at [address] Right 3/5/2021    INCISION AND DRAINAGE AND CLOSURE RIGHT TOTAL KNEE performed by Anton Burger MD at [address]  2005    OTHER SURGICAL HISTORY Left 03/08/2016    CT biopsy ablasion left kidney    OTHER SURGICAL HISTORY  2016    small intestine 12 inch removed, 2 hernia surgeries, another surgery to drain infection from incision.  REVISION TOTAL KNEE ARTHROPLASTY Right 12/17/2019    RIGHT REVISION TIBIA TOTAL KNEE REPLACEMENT performed by Moo Barber MD at [facility] Right 1/22/2020    RIGHT KNEE IRRIGATION AND DEBRIDEMENT WITH POLY EXCHANGE performed by Vivian Thrasher MD at [facility] Right 2/16/2021    RIGHT REMOVAL OF EXPLANT AND TOTAL KNEE REPLACEMENT [facility]  10/15/13    RIGHT    TOTAL KNEE ARTHROPLASTY Right 8/12/2020    RIGHT ARTHROPLASY  RESECTION WITH INSERTION OF SPACER performed by Moo Barber MD at [place] [address]  6-7-2016    UPPER GASTROINTESTINAL ENDOSCOPY  04/02/2018       Social History     Socioeconomic History    Marital status:      Spouse name: Jaime Vásquez Number of children: 2    Years of education: Not on file    Highest education level: Not on file   Occupational History    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    Tobacco Use    Smoking status: Former Smoker     Packs/day: 0.50     Years: 25.00     Pack years: 12.50     Types: Cigarettes     Start date: 1/1/1985     Quit date: 2021     Years since quittin.8    Smokeless tobacco: Never Used    Tobacco comment: Patient vapes   Vaping Use    Vaping Use: Never used   Substance and Sexual Activity    Alcohol use: No     Alcohol/week: 0.0 standard drinks     Comment: last drink     Drug use: Yes     Types: Marijuana Addie Doherty)     Comment: medical [employer] card    Sexual activity: Yes     Partners: Female     Comment: wife Mike Morrison   Other Topics Concern    Not on file   Social History Narrative    Not on file     Social Determinants of Health     Financial Resource Strain:     Difficulty of Paying Living Expenses: Not on file   Food Insecurity:     Worried About Running Out of Food in the Last Year: Not on file    Steven of Food in the Last Year: Not on file   Transportation Needs:     Lack of Transportation (Medical): Not on file    Lack of Transportation (Non-Medical):  Not on file   Physical Activity:     Days of Exercise per Week: Not on file    Minutes of Exercise per Session: Not on file   Stress:     Feeling of Stress : Not on file   Social Connections:     Frequency of Communication with Friends and Family: Not on file    Frequency of Social Gatherings with Friends and Family: Not on file    Attends Jainism Services: Not on file    Active Member of [address] [place] [address] [employer] or Organizations: Not on file    Attends Club or Organization Meetings: Not on file    Marital Status: Not on file   Intimate Partner Violence:     Fear of Current or Ex-Partner: Not on file    Emotionally Abused: Not on file    Physically Abused: Not on file    Sexually Abused: Not on file   Housing Stability:     Unable to Pay for Housing in the Last Year: Not on file    Number of [place] in the Last Year: Not on file    Unstable Housing in the Last Year: Not on file       Family History   Problem Relation Age of Onset    Cancer Father         Lymphoma    Arthritis Mother     Heart Disease Maternal Uncle     Heart Disease Maternal Uncle  Diabetes Maternal Uncle        Current Outpatient Medications on File Prior to Visit   Medication Sig Dispense Refill    atorvastatin (LIPITOR) 40 MG tablet TAKE 1 TABLET BY MOUTH EVERYDAY AT BEDTIME 90 tablet 1    FLUoxetine (PROZAC) 20 MG capsule Take 1 capsule by mouth daily 90 capsule 1    traZODone (DESYREL) 100 MG tablet Take 2 tablets by mouth nightly 180 tablet 1    pantoprazole (PROTONIX) 40 MG tablet Take 1 tablet by mouth 2 times daily 180 tablet 1    clopidogrel (PLAVIX) 75 MG tablet Take 1 tablet by mouth daily 90 tablet 1    gabapentin (NEURONTIN) 600 MG tablet Take 600 mg by mouth 3 times daily.  sildenafil (REVATIO) 20 MG tablet Take 4 tablets by mouth as needed (30 min prior to intercourse) 30 tablet 0    calcium-vitamin D (OSCAL 500/200 D-3) 500-200 MG-UNIT per tablet Take 1 tablet by mouth 2 times daily       Multiple Vitamin TABS Take 1 tablet by mouth daily       zoledronic acid (RECLAST) 5 MG/100ML SOLN Infuse 100 mLs intravenously once for 1 dose 100 mL 0     No current facility-administered medications on file prior to visit. Pertinent items are noted in HPI  Review of systems reviewed from Patient History Form and available in the patient's chart under the Media tab. No change noted. PHYSICAL EXAMINATION:  Mr. Tessie Collins is a very pleasant 49 y.o.  male who presents today in no acute distress, awake, alert, and oriented. He is well dressed, nourished and  groomed. Patient with normal affect. Height is  6' (1.829 m), weight is 208 lb (94.3 kg), Body mass index is 28.21 kg/m². Resting respiratory rate is 16. Examination of the gait, showed that the patient walks with no limp, WB left leg. Examination of both lower extrimiteis showing a good range of motion of the left hip compare to the other side. There is no swelling that can be seen, or ecchymosis over the left hip. He has intact sensation and good pedal pulses.   He has no tenderness on deep palpation over the left hip. IMAGING: Myrtle Ramirez were reviewed, taken today in the office,  AP pelvis, and showed no fracture. IMPRESSION: Left hip pain/ hip contusion. PLAN:  I assured the patient that the xray is negative for acute fracture. I discussed with Keegan Rankin treatment options, and that he can be WBAT. We discussed the risk of non displaced fracture. He can go back to normal activity with no restrictions. He will be seen PRN. The patient smokes cigarettes, and we discussed with the patient the risks of smoking on general health and also on bone and soft tissue healing (delay and non-union), and promised to cut down or stop smoking. Smoking: Educated the patient regarding the hazards of smoking and that it harms their body in many ways. It increases the chance of developing heart disease, lung disease, cancer, and other health problems including poor bone and wound healing. The importance of smoking cessation for optimal bone and wound healing was stressed. This was communicated verbally, 5 Minutes.       Tao Crooks MD

## 2022-03-07 ENCOUNTER — NURSE ONLY (OUTPATIENT)
Dept: CARDIOLOGY CLINIC | Age: 52
End: 2022-03-07
Payer: COMMERCIAL

## 2022-03-07 ENCOUNTER — APPOINTMENT (OUTPATIENT)
Dept: CT IMAGING | Age: 52
End: 2022-03-07
Payer: COMMERCIAL

## 2022-03-07 ENCOUNTER — HOSPITAL ENCOUNTER (EMERGENCY)
Age: 52
Discharge: HOME | End: 2022-03-07
Attending: EMERGENCY MEDICINE
Payer: COMMERCIAL

## 2022-03-07 VITALS
WEIGHT: 213.63 LBS | SYSTOLIC BLOOD PRESSURE: 123 MMHG | BODY MASS INDEX: 28.97 KG/M2 | TEMPERATURE: 98.3 F | DIASTOLIC BLOOD PRESSURE: 78 MMHG | RESPIRATION RATE: 14 BRPM | HEART RATE: 49 BPM | OXYGEN SATURATION: 100 %

## 2022-03-07 DIAGNOSIS — R10.32 ABDOMINAL PAIN, LEFT LOWER QUADRANT: Primary | ICD-10-CM

## 2022-03-07 DIAGNOSIS — G45.9 TIA (TRANSIENT ISCHEMIC ATTACK): ICD-10-CM

## 2022-03-07 DIAGNOSIS — R55 SYNCOPE, UNSPECIFIED SYNCOPE TYPE: ICD-10-CM

## 2022-03-07 DIAGNOSIS — Z95.818 STATUS POST PLACEMENT OF IMPLANTABLE LOOP RECORDER: ICD-10-CM

## 2022-03-07 LAB
A/G RATIO: 2.1 (ref 1.1–2.2)
ALBUMIN SERPL-MCNC: 3.9 G/DL (ref 3.4–5)
ALP BLD-CCNC: 103 U/L (ref 40–129)
ALT SERPL-CCNC: 12 U/L (ref 10–40)
ANION GAP SERPL CALCULATED.3IONS-SCNC: 8 MMOL/L (ref 3–16)
AST SERPL-CCNC: 16 U/L (ref 15–37)
BASOPHILS ABSOLUTE: 0.2 K/UL (ref 0–0.2)
BASOPHILS RELATIVE PERCENT: 2 %
BILIRUB SERPL-MCNC: 0.3 MG/DL (ref 0–1)
BILIRUBIN URINE: NEGATIVE
BLOOD, URINE: NEGATIVE
BUN BLDV-MCNC: 12 MG/DL (ref 7–20)
CALCIUM SERPL-MCNC: 9 MG/DL (ref 8.3–10.6)
CHLORIDE BLD-SCNC: 105 MMOL/L (ref 99–110)
CLARITY: CLEAR
CO2: 26 MMOL/L (ref 21–32)
COLOR: YELLOW
CREAT SERPL-MCNC: 1.1 MG/DL (ref 0.9–1.3)
EOSINOPHILS ABSOLUTE: 0.4 K/UL (ref 0–0.6)
EOSINOPHILS RELATIVE PERCENT: 5.7 %
GFR AFRICAN AMERICAN: >60
GFR NON-AFRICAN AMERICAN: >60
GLUCOSE BLD-MCNC: 90 MG/DL (ref 70–99)
GLUCOSE URINE: NEGATIVE MG/DL
HCT VFR BLD CALC: 36.1 % (ref 40.5–52.5)
HEMOGLOBIN: 12.1 G/DL (ref 13.5–17.5)
KETONES, URINE: NEGATIVE MG/DL
LEUKOCYTE ESTERASE, URINE: NEGATIVE
LIPASE: 42 U/L (ref 13–60)
LYMPHOCYTES ABSOLUTE: 2.9 K/UL (ref 1–5.1)
LYMPHOCYTES RELATIVE PERCENT: 39.3 %
MCH RBC QN AUTO: 30.6 PG (ref 26–34)
MCHC RBC AUTO-ENTMCNC: 33.6 G/DL (ref 31–36)
MCV RBC AUTO: 91 FL (ref 80–100)
MICROSCOPIC EXAMINATION: NORMAL
MONOCYTES ABSOLUTE: 0.5 K/UL (ref 0–1.3)
MONOCYTES RELATIVE PERCENT: 6.2 %
NEUTROPHILS ABSOLUTE: 3.5 K/UL (ref 1.7–7.7)
NEUTROPHILS RELATIVE PERCENT: 46.8 %
NITRITE, URINE: NEGATIVE
PDW BLD-RTO: 15.8 % (ref 12.4–15.4)
PH UA: 6 (ref 5–8)
PLATELET # BLD: 273 K/UL (ref 135–450)
PMV BLD AUTO: 8.3 FL (ref 5–10.5)
POTASSIUM SERPL-SCNC: 4 MMOL/L (ref 3.5–5.1)
PROTEIN UA: NEGATIVE MG/DL
RBC # BLD: 3.97 M/UL (ref 4.2–5.9)
SODIUM BLD-SCNC: 139 MMOL/L (ref 136–145)
SPECIFIC GRAVITY UA: 1.02 (ref 1–1.03)
TOTAL PROTEIN: 5.8 G/DL (ref 6.4–8.2)
URINE REFLEX TO CULTURE: NORMAL
URINE TYPE: NORMAL
UROBILINOGEN, URINE: 0.2 E.U./DL
WBC # BLD: 7.5 K/UL (ref 4–11)

## 2022-03-07 PROCEDURE — 96374 THER/PROPH/DIAG INJ IV PUSH: CPT

## 2022-03-07 PROCEDURE — 83690 ASSAY OF LIPASE: CPT

## 2022-03-07 PROCEDURE — 93298 REM INTERROG DEV EVAL SCRMS: CPT | Performed by: INTERNAL MEDICINE

## 2022-03-07 PROCEDURE — 99283 EMERGENCY DEPT VISIT LOW MDM: CPT

## 2022-03-07 PROCEDURE — 81003 URINALYSIS AUTO W/O SCOPE: CPT

## 2022-03-07 PROCEDURE — 96375 TX/PRO/DX INJ NEW DRUG ADDON: CPT

## 2022-03-07 PROCEDURE — 2580000003 HC RX 258: Performed by: EMERGENCY MEDICINE

## 2022-03-07 PROCEDURE — 6360000002 HC RX W HCPCS: Performed by: EMERGENCY MEDICINE

## 2022-03-07 PROCEDURE — 85025 COMPLETE CBC W/AUTO DIFF WBC: CPT

## 2022-03-07 PROCEDURE — G2066 INTER DEVC REMOTE 30D: HCPCS | Performed by: INTERNAL MEDICINE

## 2022-03-07 PROCEDURE — 6360000004 HC RX CONTRAST MEDICATION: Performed by: EMERGENCY MEDICINE

## 2022-03-07 PROCEDURE — 74177 CT ABD & PELVIS W/CONTRAST: CPT

## 2022-03-07 PROCEDURE — 80053 COMPREHEN METABOLIC PANEL: CPT

## 2022-03-07 PROCEDURE — 36415 COLL VENOUS BLD VENIPUNCTURE: CPT

## 2022-03-07 RX ORDER — MORPHINE SULFATE 4 MG/ML
4 INJECTION, SOLUTION INTRAMUSCULAR; INTRAVENOUS ONCE
Status: COMPLETED | OUTPATIENT
Start: 2022-03-07 | End: 2022-03-07

## 2022-03-07 RX ORDER — ONDANSETRON 4 MG/1
4 TABLET, ORALLY DISINTEGRATING ORAL EVERY 8 HOURS PRN
Qty: 20 TABLET | Refills: 0 | Status: SHIPPED | OUTPATIENT
Start: 2022-03-07

## 2022-03-07 RX ORDER — 0.9 % SODIUM CHLORIDE 0.9 %
1000 INTRAVENOUS SOLUTION INTRAVENOUS ONCE
Status: COMPLETED | OUTPATIENT
Start: 2022-03-07 | End: 2022-03-07

## 2022-03-07 RX ORDER — DICYCLOMINE HYDROCHLORIDE 10 MG/1
10 CAPSULE ORAL EVERY 6 HOURS PRN
Qty: 20 CAPSULE | Refills: 0 | Status: SHIPPED | OUTPATIENT
Start: 2022-03-07 | End: 2022-05-06

## 2022-03-07 RX ORDER — ONDANSETRON 2 MG/ML
4 INJECTION INTRAMUSCULAR; INTRAVENOUS ONCE
Status: COMPLETED | OUTPATIENT
Start: 2022-03-07 | End: 2022-03-07

## 2022-03-07 RX ADMIN — MORPHINE SULFATE 4 MG: 4 INJECTION, SOLUTION INTRAMUSCULAR; INTRAVENOUS at 22:12

## 2022-03-07 RX ADMIN — IOPAMIDOL 100 ML: 755 INJECTION, SOLUTION INTRAVENOUS at 22:43

## 2022-03-07 RX ADMIN — ONDANSETRON 4 MG: 2 INJECTION INTRAMUSCULAR; INTRAVENOUS at 22:12

## 2022-03-07 RX ADMIN — SODIUM CHLORIDE 1000 ML: 9 INJECTION, SOLUTION INTRAVENOUS at 22:10

## 2022-03-07 ASSESSMENT — ENCOUNTER SYMPTOMS
ABDOMINAL PAIN: 1
VOMITING: 0
TROUBLE SWALLOWING: 0
COLOR CHANGE: 0
PHOTOPHOBIA: 0
STRIDOR: 0
BLOOD IN STOOL: 0
WHEEZING: 0
FACIAL SWELLING: 0
SHORTNESS OF BREATH: 0
VOICE CHANGE: 0
BACK PAIN: 0
NAUSEA: 1

## 2022-03-07 ASSESSMENT — PAIN SCALES - GENERAL: PAINLEVEL_OUTOF10: 9

## 2022-03-08 ENCOUNTER — CARE COORDINATION (OUTPATIENT)
Dept: CARE COORDINATION | Age: 52
End: 2022-03-08

## 2022-03-08 NOTE — ED NOTES
Pt called out, inquiring about more pain medicine  EMD made aware  Made pt aware that EMD was waiting for CT restults     Tiburcio Tobar, NORBERTO  03/07/22 [id number]

## 2022-03-08 NOTE — ED PROVIDER NOTES
[address] [facility]  eMERGENCY dEPARTMENT eNCOUnter      Pt Name: Tangela Crooks  MRN: [id number]  [place] 1970  Date of evaluation: 3/7/2022  Provider: Ivan Franco MD    [address] [place] [address]       Chief Complaint   Patient presents with    Abdominal Pain    Nausea         HISTORY OF PRESENT ILLNESS   (Location/Symptom, Timing/Onset, Context/Setting, Quality, Duration, Modifying Factors, Severity)  Note limiting factors. Tangela Crooks is a 49 y.o. male with hx of previous abdominal surgeries who reports 3 days of generalized abdominal pain and nausea. Patient denies any vomiting. Patient denies any inability tolerate p.o. Patient reports his symptoms are moderate severe, constant, and worsening. Reports attempting to eat worsens the symptoms and nothing improves them. Patient denies any dysuria, urinary frequency, testicle or scrotal pain or swelling. HPI    Nursing Notes were reviewed. REVIEW OFSYSTEMS    (2-9 systems for level 4, 10 or more for level 5)     Review of Systems   Constitutional: Negative for appetite change, fever and unexpected weight change. HENT: Negative for facial swelling, trouble swallowing and voice change. Eyes: Negative for photophobia and visual disturbance. Respiratory: Negative for shortness of breath, wheezing and stridor. Cardiovascular: Negative for chest pain and palpitations. Gastrointestinal: Positive for abdominal pain and nausea. Negative for blood in stool and vomiting. Genitourinary: Negative for difficulty urinating and dysuria. Musculoskeletal: Negative for back pain, gait problem and neck pain. Skin: Negative for color change and wound. Neurological: Negative for seizures, syncope and speech difficulty. Psychiatric/Behavioral: Negative for self-injury and suicidal ideas. Except as noted above the remainder of the review of systems was reviewed and negative.        PAST MEDICAL HISTORY     Past Medical History:   Diagnosis Date    Alcoholism ([facility]     last drink 2015    Allergic migraine with status migrainosus     Allergic rhinitis, seasonal     Anemia     Arthritis     right knee    Vaughn's esophagus     Benign intracranial hypertension     Cancer (HCC)     renal     Carpal tunnel syndrome     Chronic pain     Depression     GERD (gastroesophageal reflux disease)     Headache(784.0)     History of blood transfusion     History of tobacco use     Quit 7/2014    Migraine     chronic for 1 year    Morbid obesity ([address]     Movement disorder     Onychomycosis     Sleep apnea, obstructive     Severe uses cpap stop bang 6    Unspecified cerebral artery occlusion with cerebral infarction 2014    slight weakness in left arm    Use of cane as ambulatory aid     Wears dentures     full set    Wears glasses          SURGICAL HISTORY       Past Surgical History:   Procedure Laterality Date    ABDOMEN SURGERY      gastric bypass    ABDOMEN SURGERY  4-7-2016    repair of recurrent incisional hernia with mesh, removal of old mesh, bilateral component separation    ABDOMINAL EXPLORATION SURGERY  1/11/16    exp lap, lysis of adhesions, small bowel resection    CARPAL TUNNEL RELEASE      bilat    DILATATION, ESOPHAGUS      ENDOSCOPY, COLON, DIAGNOSTIC      EYE SURGERY      GASTRIC BYPASS SURGERY  Jan 2009    Has lost about 200 pounds.     HERNIA REPAIR  3-    ventral    JOINT REPLACEMENT      KIDNEY REMOVAL Left 08/01/2018    KNEE ARTHROSCOPY Right 7/11/2013    Dr.Robert Sanders     KNEE ARTHROSCOPY Right 7/11/12    RIGHT KNEE ARTHROSCOPY WITH CHONDROPLASTY    KNEE SURGERY  July 2012    right, arthroscopy    KNEE SURGERY Right 2/18/2020    INCISION AND DRAINAGE RIGHT KNEE AND WOULD VAC PLACEMENT performed by Jaya Schultz MD at [facility] Right 2/26/2021    INCISION AND DRAINAGE OF RIGHT KNEE HEMATOMA performed by Sangeeta Godinez MD at [facility] Right 3/5/2021    INCISION AND DRAINAGE AND CLOSURE RIGHT TOTAL KNEE performed by Florecita Quinones MD at [address]  2005    OTHER SURGICAL HISTORY Left 03/08/2016    CT biopsy ablasion left kidney    OTHER SURGICAL HISTORY  2016    small intestine 12 inch removed, 2 hernia surgeries, another surgery to drain infection from incision.  REVISION TOTAL KNEE ARTHROPLASTY Right 12/17/2019    RIGHT REVISION TIBIA TOTAL KNEE REPLACEMENT performed by Viral Joiner MD at [facility] Right 1/22/2020    RIGHT KNEE IRRIGATION AND DEBRIDEMENT WITH POLY EXCHANGE performed by Kiarra Jones MD at [facility] Right 2/16/2021    RIGHT REMOVAL OF EXPLANT AND TOTAL KNEE REPLACEMENT performed [facility]  10/15/13    RIGHT    TOTAL KNEE ARTHROPLASTY Right 8/12/2020    RIGHT ARTHROPLASY  RESECTION WITH INSERTION OF SPACER performed by Viral Joiner MD at [facility]  6-7-2016    UPPER GASTROINTESTINAL ENDOSCOPY  04/02/2018         CURRENT MEDICATIONS       Previous Medications    ATORVASTATIN (LIPITOR) 40 MG TABLET    TAKE 1 TABLET BY MOUTH EVERYDAY AT BEDTIME    CALCIUM-VITAMIN D (OSCAL 500/200 D-3) 500-200 MG-UNIT PER TABLET    Take 1 tablet by mouth 2 times daily     CLOPIDOGREL (PLAVIX) 75 MG TABLET    Take 1 tablet by mouth daily    FLUOXETINE (PROZAC) 20 MG CAPSULE    Take 1 capsule by mouth daily    GABAPENTIN (NEURONTIN) 600 MG TABLET    Take 600 mg by mouth 3 times daily.     MULTIPLE VITAMIN TABS    Take 1 tablet by mouth daily     PANTOPRAZOLE (PROTONIX) 40 MG TABLET    Take 1 tablet by mouth 2 times daily    SILDENAFIL (REVATIO) 20 MG TABLET    Take 4 tablets by mouth as needed (30 min prior to intercourse)    TRAZODONE (DESYREL) 100 MG TABLET    Take 2 tablets by mouth nightly    ZOLEDRONIC ACID (RECLAST) 5 MG/100ML SOLN Infuse 100 mLs intravenously once for 1 dose       ALLERGIES     Nsaids, Morphine, Prochlorperazine, Valtrex [valacyclovir hcl], Fentanyl, Morphine and related, and Tolmetin    FAMILY HISTORY       Family History   Problem Relation Age of Onset    Cancer Father         Lymphoma    Arthritis Mother     Heart Disease Maternal Uncle     Heart Disease Maternal Uncle     Diabetes Maternal Uncle           SOCIAL HISTORY       Social History     Socioeconomic History    Marital status:      Spouse name: Soila Humphries Number of children: 2    Years of education: Not on file    Highest education level: Not on file   Occupational History    Occupation: [employer], [employer] his son. Tobacco Use    Smoking status: Former Smoker     Packs/day: 0.50     Years: 25.00     Pack years: 12.50     Types: Cigarettes     Start date: 1985     Quit date: 2021     Years since quittin.8    Smokeless tobacco: Never Used    Tobacco comment: Patient vapes   Vaping Use    Vaping Use: Never used   Substance and Sexual Activity    Alcohol use: No     Alcohol/week: 0.0 standard drinks     Comment: last drink     Drug use: Yes     Types: Marijuana Deboraha Santrey)     Comment: medical marjiuana card    Sexual activity: Yes     Partners: Female     Comment: wife Katie Luis   Other Topics Concern    Not on file   Social History Narrative    Not on file     Social Determinants of Health     Financial Resource Strain:     Difficulty of Paying Living Expenses: Not on file   Food Insecurity:     Worried About Running Out of Food in the Last Year: Not on file    Steven of Food in the Last Year: Not on file   Transportation Needs:     Lack of Transportation (Medical): Not on file    Lack of Transportation (Non-Medical):  Not on file   Physical Activity:     Days of Exercise per Week: Not on file    Minutes of Exercise per Session: Not on file   Stress:     Feeling of Stress : Not on file   Social Connections:  Frequency of Communication with Friends and Family: Not on file    Frequency of Social Gatherings with Friends and Family: Not on file    Attends Caodaism Services: Not on file    Active Member of Clubs or Organizations: Not on file    Attends Club or Organization Meetings: Not on file    Marital Status: Not on file   Intimate Partner Violence:     Fear of Current or Ex-Partner: Not on file    Emotionally Abused: Not on file    Physically Abused: Not on file    Sexually Abused: Not on file   Housing Stability:     Unable to Pay for Housing in the Last Year: Not on file    Number of [place] in the Last Year: Not on file    Unstable Housing in the Last Year: Not on file         PHYSICAL EXAM    (up to 7 for level 4, 8 or more for level 5)     ED Triage Vitals [03/07/22 2109]   BP Temp Temp Source Pulse Resp SpO2 Height Weight   123/78 98.3 °F (36.8 °C) Oral (!) 49 14 100 % -- 213 lb 10 oz (96.9 kg)       Physical Exam  Vitals and nursing note reviewed. Constitutional:       General: He is not in acute distress. Appearance: He is well-developed. HENT:      Head: Normocephalic and atraumatic. Right Ear: External ear normal.      Left Ear: External ear normal.   Eyes:      Conjunctiva/sclera: Conjunctivae normal.   Neck:      Vascular: No JVD. Trachea: No tracheal deviation. Cardiovascular:      Rate and Rhythm: Normal rate. Pulmonary:      Effort: Pulmonary effort is normal. No respiratory distress. Breath sounds: Normal breath sounds. No wheezing. Abdominal:      General: There is no distension. Palpations: Abdomen is soft. Tenderness: There is abdominal tenderness in the left lower quadrant. There is no guarding or rebound. Comments: Left lower quadrant tenderness to palpation but no rebound, guarding, or peritoneal signs   Musculoskeletal:         General: No tenderness. Normal range of motion. Cervical back: Neck supple.    Skin:     General: Skin is warm and dry. Neurological:      Mental Status: He is alert. Cranial Nerves: No cranial nerve deficit. DIAGNOSTIC RESULTS       RADIOLOGY:     Interpretation per the Radiologist below, if available at the time of this note:    CT ABDOMEN PELVIS W IV CONTRAST Additional Contrast? None   Final Result   Previous gastric bypass surgery and mild constipation with no obvious bowel   obstruction. Cholelithiasis which is unchanged. Small right renal cysts and previous left nephrectomy which is unchanged. Mild chronic liver changes and previous splenectomy which is unchanged. Mild prostatic enlargement with no pelvic mass. Diverticulosis throughout the sigmoid colon with no pericolonic inflammation   and a normal appendix. Status post vertebroplasty at L1 which is moderately compressed and unchanged. LABS:  Labs Reviewed   CBC WITH AUTO DIFFERENTIAL - Abnormal; Notable for the following components:       Result Value    RBC 3.97 (*)     Hemoglobin 12.1 (*)     Hematocrit 36.1 (*)     RDW 15.8 (*)     All other components within normal limits   COMPREHENSIVE METABOLIC PANEL - Abnormal; Notable for the following components: Total Protein 5.8 (*)     All other components within normal limits   LIPASE   URINALYSIS WITH REFLEX TO CULTURE       All otherlabs were within normal range or not returned as of this dictation. EMERGENCY DEPARTMENT COURSE and DIFFERENTIAL DIAGNOSIS/MDM:   Vitals:    Vitals:    03/07/22 2109   BP: 123/78   Pulse: (!) 49   Resp: 14   Temp: 98.3 °F (36.8 °C)   TempSrc: Oral   SpO2: 100%   Weight: 213 lb 10 oz (96.9 kg)         MDM  Laboratory evaluation is unremarkable. CT abdomen pelvis does have multiple incidental findings however does not show any evidence of acute emergent pathology. This is discussed with the patient. He reports that he will see Dr. uNvia Joseph with gastroenterology and this is encouraged.   In the meantime I will prescribe him Zofran and Bentyl for attempt at symptomatic relief. Strict ER return precautions are given for fever or worsening of symptoms. I do not suspect acute endorgan damage or emergent pathology at this time and feel trial of outpatient management is appropriate. Patient expresses understanding and agreement with this plan and is discharged home. I estimate there is low risk for acute appendicitis, bowl obstruction, cholecystitis, diverticulitis, incarcerated hernia, mesenteric ischemia, pancreatitis, perforated bowl or ulcer, or abdominal aortic aneurism, thus I consider the discharge disposition reasonable. Also, there is no evidence or peritonitis, sepsis or toxicity. We have discussed the diagnosis and risks and agree with discharging home to follow-up with their primary doctor. We also discussed returning to the Emergency Department immediately if new or worsening symptoms occur and have discussed the symptoms which are most concerning (e.g., bloody stool, fever, changing or worsening pain, vomiting) that necessitate immediate return. Procedures    FINAL IMPRESSION      1. Abdominal pain, left lower quadrant          DISPOSITION/PLAN   DISPOSITION  Discharge      PATIENT REFERRED TO:  Alex Fall DO  [address] [facility]  [address]  [address]    In 3 days      [facility], [address] [address]    In 1 week      [facility]  [place] [address] [phone]    If symptoms worsen      DISCHARGE MEDICATIONS:  New Prescriptions    DICYCLOMINE (BENTYL) 10 MG CAPSULE    Take 1 capsule by mouth every 6 hours as needed (cramps)    ONDANSETRON (ZOFRAN ODT) 4 MG DISINTEGRATING TABLET    Take 1 tablet by mouth every 8 hours as needed for Nausea          (Please note that portions of this note were completed with a voice recognition program.  Efforts were made to edit the dictations but occasionally words aremis-transcribed. )    Edith Cuenca MD (electronically signed)  Attending Emergency Physician           Edith Cuenca MD  03/07/22 2615

## 2022-03-08 NOTE — ED NOTES
discharge instructions instructions ad medications reviewed with patient, patient verbalized understanding.      Marlane Kawasaki, RN  03/07/22 9142       Marlane Kawasaki, RN  03/07/22 4214

## 2022-03-10 ENCOUNTER — CARE COORDINATION (OUTPATIENT)
Dept: CARE COORDINATION | Age: 52
End: 2022-03-10

## 2022-03-10 NOTE — CARE COORDINATION
Educated patient about risk for severe COVID-19 due to risk factors according to CDC guidelines. ACM reviewed discharge instructions, medical action plan and red flag symptoms with the patient who verbalized understanding. Discussed COVID vaccination status: Yes. Education provided on COVID-19 vaccination as appropriate. Discussed exposure protocols and quarantine with CDC Guidelines. Patient was given an opportunity to verbalize any questions and concerns and agrees to contact ACM or health care provider for questions related to their healthcare. Outreach call to patient to assess needs for Ambulatory Care Management. The patient denies needs at this time, but has this ACM's contact information and was encouraged to reach out for any needs in the future. The patient is agreeable to a follow up call in 1 week.

## 2022-03-15 ENCOUNTER — PATIENT MESSAGE (OUTPATIENT)
Dept: FAMILY MEDICINE CLINIC | Age: 52
End: 2022-03-15

## 2022-03-15 ENCOUNTER — TELEPHONE (OUTPATIENT)
Dept: CARDIOLOGY CLINIC | Age: 52
End: 2022-03-15

## 2022-03-15 NOTE — TELEPHONE ENCOUNTER
CARDIAC CLEARANCE     Procedure: upper endoscopy, colonoscopy, endoscopic ultrasound and/or an ERCP   : Dr. Paul Petit   Date: 05/11/2022    Medications needing held: any anticoagulant/ antiplatelet therapy (pt. is on Plavix)      How long?: 5 days prior     Phone Number and Contact Name for Physicians office: [phone] attn: Christa RUIZ  Fax number to send information: [phone]    Clinical staff:    Cardiologist: Dr. Julieth Wilson   Last Appointment: 09/17/2021  Next Appointment: 05/06/2022  Cardiac History: TIA and Acute deep vein thrombosis       Received cardiac clearance request from [address] Letter has been scanned under media.

## 2022-03-15 NOTE — TELEPHONE ENCOUNTER
Preoperative risk assessment    Patient planning to have upper endoscopy, colonoscopy, endoscopic ultrasound and/or an ERCP with Dr Wilton Cox and is needing to hold Plavix x 5 days.      Last office visit 9/17/2021  Hx S/p PFO closure 8/5/2021, DVT, CVA/TIA

## 2022-03-15 NOTE — LETTER
[address] [facility]  Phone: [phone]  Fax: [phone]    Eduar Potter MD        March 15, 2022    [place]  1970    To Whom This May Concern:    Patient is cleared from a cardiac standpoint and may proceed with scheduled procedure on 5/11/2022 with Dr. Gonsalo Bell. Patient is cleared to hold anticoagulant/antiplatelet therapy for 5 days prior to procedure. If you have any questions or concerns, please don't hesitate to call.     Sincerely,        Eduar Potter MD/ Faith Canchola MA

## 2022-03-15 NOTE — TELEPHONE ENCOUNTER
From: Cat Unger  To: Dr. Gina Bartholomew: 3/15/2022 3:01 PM EDT  Subject: Regarding medications    Dr. Verónica Andre,  As Im sure you probably know Dr. Lisabeth Spurling has left [facility] and moved to a new practice. For some time now the main reason I saw him was to maintain prescriptions that he wrote to for Prozac and trazodone. Would you be willing to take these two medications on? My other question is regarding the injection to take care of my bone density loss. I recently received a notice from my insurance company that they approved the infusion, however I still not heard from anyone from regarding scheduling the appointment. Should someone be calling me, or would it be best for me just to call central scheduling to get the infusion on the books?

## 2022-03-18 ENCOUNTER — TELEMEDICINE (OUTPATIENT)
Dept: PULMONOLOGY | Age: 52
End: 2022-03-18
Payer: COMMERCIAL

## 2022-03-18 DIAGNOSIS — G47.33 OBSTRUCTIVE SLEEP APNEA: Chronic | ICD-10-CM

## 2022-03-18 DIAGNOSIS — F51.01 PRIMARY INSOMNIA: Chronic | ICD-10-CM

## 2022-03-18 DIAGNOSIS — K21.00 GASTROESOPHAGEAL REFLUX DISEASE WITH ESOPHAGITIS, UNSPECIFIED WHETHER HEMORRHAGE: Chronic | ICD-10-CM

## 2022-03-18 PROCEDURE — 99214 OFFICE O/P EST MOD 30 MIN: CPT | Performed by: INTERNAL MEDICINE

## 2022-03-18 ASSESSMENT — SLEEP AND FATIGUE QUESTIONNAIRES
HOW LIKELY ARE YOU TO NOD OFF OR FALL ASLEEP WHILE LYING DOWN TO REST IN THE AFTERNOON WHEN CIRCUMSTANCES PERMIT: 1
HOW LIKELY ARE YOU TO NOD OFF OR FALL ASLEEP WHILE SITTING AND TALKING TO SOMEONE: 0
HOW LIKELY ARE YOU TO NOD OFF OR FALL ASLEEP WHILE WATCHING TV: 1
HOW LIKELY ARE YOU TO NOD OFF OR FALL ASLEEP WHILE SITTING INACTIVE IN A PUBLIC PLACE: 0
ESS TOTAL SCORE: 2
HOW LIKELY ARE YOU TO NOD OFF OR FALL ASLEEP WHILE SITTING QUIETLY AFTER LUNCH WITHOUT ALCOHOL: 0
HOW LIKELY ARE YOU TO NOD OFF OR FALL ASLEEP WHEN YOU ARE A PASSENGER IN A CAR FOR AN HOUR WITHOUT A BREAK: 0
HOW LIKELY ARE YOU TO NOD OFF OR FALL ASLEEP WHILE SITTING AND READING: 0
HOW LIKELY ARE YOU TO NOD OFF OR FALL ASLEEP IN A CAR, WHILE STOPPED FOR A FEW MINUTES IN TRAFFIC: 0

## 2022-03-18 NOTE — PROGRESS NOTES
Rod Horne         : 1970    Diagnosis: [x] OSCAR (G47.33) [] CSA (G47.31) [] Apnea (G47.30)   Length of Need: [x] 13 Months [] 99 Months [] Other:    Machine (ALFREDA!): [] Respironics Dream Station      Auto [] ResMed AirSense     Auto [] Other:     []  CPAP () [] Bilevel ()   Mode: [] Auto [] Spontaneous    Mode: [] Auto [] Spontaneous              Comfort Settings:        Humidifier: [] Heated ()        [x] Water chamber replacement ()/ 1 per 6 months        Mask:   [] Nasal () /1 per 3 months [x] Full Face () /1 per 3 months   [] Patient choice -Size and fit mask [x] Patient Choice - Size and fit mask   [] Dispense:  [] Dispense:    [] Headgear () / 1 per 3 months [x] Headgear () / 1 per 3 months   [] Replacement Nasal Cushion ()/2 per month [x] Interface Replacement ()/1 per month   [] Replacement Nasal Pillows ()/2 per month         Tubing: [x] Heated ()/1 per 3 months    [] Standard ()/1 per 3 months [] Other:           Filters: [x] Non-disposable ()/1 per 6 months     [x] Ultra-Fine, Disposable ()/2 per month        Miscellaneous: [] Chin Strap ()/ 1 per 6 months [] O2 bleed-in:       LPM   [] Oximetry on CPAP/Bilevel []  Other:    [x] Modem: ()         Start Order Date: 22    MEDICAL JUSTIFICATION:  I, the undersigned, certify that the above prescribed supplies are medically necessary for this patients wellbeing. In my opinion, the supplies are both reasonable and necessary in reference to accepted standards of medicalpractice in treatment of this patients condition.     Anna Lao MD      NPI: [id number]       Order Signed Date: 22    Electronically signed by Anna Lao MD on 3/18/2022 at 2:50 PM    Rod Horne  1970  [address]  [address] [facility] [address]  [phone] (home)   [phone] (mobile)      Insurance Info (confirm with patient if correct):  Payor/Plan Subscr  Sex Relation Sub. Ins. ID Effective Group Num   1. [address] 3/14/1973 Female  [id number] Not Eff [id number]                                   [address]   2.  [address] -* Corry Founds 3/14/1973 Female  [id number] Not Eff [id number]                                   [address]

## 2022-03-18 NOTE — PROGRESS NOTES
Tom Link MD, [facility], [facility]  Dada Dyson [address]  [address]to [address] [facility], [facility] Derrick Mayen E [phone]   [facility] Kristen Bazan. [address] [place] [address]. Axel Patel 37 [phone]     Video Visit- Follow up    Karlene Gill, was evaluated through a synchronous (real-time) audio-video  encounter. The patient (or guardian if applicable) is aware that this is a billable  service, which includes applicable co-pays. This Virtual Visit was conducted with  patient's (and/or legal guardian's) consent. The visit was conducted pursuant to  the emergency declaration under the [facility], [address] [place] [address] waiver authority and the [employer] and  [employer] General Act. Patient identification was verified,  and a caregiver was present when appropriate. The patient was located in a  state where the provider was licensed to provide care. Assessment/Plan:      1. Obstructive sleep apnea,Severe -(on cpap)  Assessment & Plan:  Chronic-Stable: Reviewed and analyzed results of physiologic download from patient's machine and reviewed with patient. Supplies and parts as needed for his machine. These are medically necessary. Limit caffeine use after 3pm. Based on the analyzed data will continue with current settings. Discussed with patient today the recent recall issued by [employer] for their DNA Guide platform Pap machines. Reviewed that it affected a very small number of machines (0.03%) and seems to be exacerbated by using ozone disinfection or machine being exposed to a very high heat/humidity environment. Recommended the patient immediately discontinue use of any external ozone  if being used. Discussed the risk of untreated sleep apnea (including but not limited to early morbidity mortality, motor vehicle accidents, and cardiovascular issues, etc.) versus the very small risk of foam degradation with use of the machine. want to go back on it, he feels it to any issues with it.   He is currently on trazodone 200 mg and using medical marijuana and being managed by his psychiatrist.    Jo Ann Landon    [place] - Total score: 2    Current Outpatient Medications   Medication Instructions    atorvastatin (LIPITOR) 40 MG tablet TAKE 1 TABLET BY MOUTH EVERYDAY AT BEDTIME    calcium-vitamin D (OSCAL 500/200 D-3) 500-200 MG-UNIT per tablet 1 tablet, Oral, 2 TIMES DAILY    clopidogrel (PLAVIX) 75 mg, Oral, DAILY    dicyclomine (BENTYL) 10 mg, Oral, EVERY 6 HOURS PRN    FLUoxetine (PROZAC) 20 mg, Oral, DAILY    gabapentin (NEURONTIN) 600 mg, Oral, 3 TIMES DAILY    Multiple Vitamin TABS 1 tablet, Oral, DAILY    ondansetron (ZOFRAN ODT) 4 mg, Oral, EVERY 8 HOURS PRN    pantoprazole (PROTONIX) 40 mg, Oral, 2 TIMES DAILY    sildenafil (REVATIO) 80 mg, Oral, PRN    traZODone (DESYREL) 200 mg, Oral, NIGHTLY    zoledronic acid (RECLAST) 5 mg, IntraVENous, ONCE        Electronically signed by Ignacio Lee MD on 3/18/2022 at 2:53 PM

## 2022-03-18 NOTE — ASSESSMENT & PLAN NOTE
Chronic-stable:  Agree with continuing trazodone 200 mg, patient is stable on that dose and gaining benefit.   Agree with patient that he should try to stay off Ambien as much as possible

## 2022-03-18 NOTE — LETTER
[facility] Sleep Medicine  [id number]  [place] [address]  Phone: [phone]  Fax: [phone]    Cory Medina MD    March 18, 2022     Abebe Mercedes, [address] [facility] [address] [facility]    Patient: Iraida Wolfe   MR Number: [id number]   YOB: 1970   Date of Visit: 3/18/2022       Dear Abebe Mercedes: Thank you for referring Garcia Burris to me for evaluation/treatment. Below are the relevant portions of my assessment and plan of care. 1. Obstructive sleep apnea,Severe -(on cpap)  Assessment & Plan:  Chronic-Stable: Reviewed and analyzed results of physiologic download from patient's machine and reviewed with patient. Supplies and parts as needed for his machine. These are medically necessary. Limit caffeine use after 3pm. Based on the analyzed data will continue with current settings. Discussed with patient today the recent recall issued by [employer] for their [employer] platform Pap machines. Reviewed that it affected a very small number of machines (0.03%) and seems to be exacerbated by using ozone disinfection or machine being exposed to a very high heat/humidity environment. Recommended the patient immediately discontinue use of any external ozone  if being used. Discussed the risk of untreated sleep apnea (including but not limited to early morbidity mortality, motor vehicle accidents, and cardiovascular issues, etc.) versus the very small risk of foam degradation with use of the machine. Possible alternative may be to switch manufacturers for their machine but will need to see if their insurance company will allow that. 2. Primary insomnia  Assessment & Plan:  Chronic-stable:  Agree with continuing trazodone 200 mg, patient is stable on that dose and gaining benefit. Agree with patient that he should try to stay off Ambien as much as possible  3.  Gastroesophageal reflux disease with esophagitis, unspecified whether hemorrhage  Assessment & Plan:  Chronic- Stable. Discussed the importance of treating sleep apnea as part of the management of this disorder. Cont any meds per PCP and other physicians. Reviewed, analyzed, and documented physiologic data from patient's PAP machine. This information was analyzed to assess complexity and medical decision making in regards to further testing and management. Diagnoses of Obstructive sleep apnea,Severe -(on cpap), Primary insomnia, and Gastroesophageal reflux disease with esophagitis, unspecified whether hemorrhage were pertinent to this visit. The chronic medical conditions listed are directly related to the primary diagnosis listed above. The management of the primary diagnosis affects the secondary diagnosis and vice versa. If you have questions, please do not hesitate to call me. I look forward to following Amber Isbell along with you.     Sincerely,      Melissa Centeno MD

## 2022-03-22 ENCOUNTER — HOSPITAL ENCOUNTER (INPATIENT)
Age: 52
LOS: 3 days | Discharge: HOME | DRG: 382 | End: 2022-03-25
Attending: EMERGENCY MEDICINE | Admitting: INTERNAL MEDICINE
Payer: COMMERCIAL

## 2022-03-22 ENCOUNTER — APPOINTMENT (OUTPATIENT)
Dept: CT IMAGING | Age: 52
DRG: 382 | End: 2022-03-22
Payer: COMMERCIAL

## 2022-03-22 DIAGNOSIS — R11.2 INTRACTABLE NAUSEA AND VOMITING: Primary | ICD-10-CM

## 2022-03-22 DIAGNOSIS — R00.1 BRADYCARDIA: ICD-10-CM

## 2022-03-22 DIAGNOSIS — R10.84 GENERALIZED ABDOMINAL PAIN: ICD-10-CM

## 2022-03-22 LAB
ACANTHOCYTES: ABNORMAL
ALBUMIN SERPL-MCNC: 4.1 G/DL (ref 3.4–5)
ALP BLD-CCNC: 110 U/L (ref 40–129)
ALT SERPL-CCNC: 10 U/L (ref 10–40)
AMPHETAMINE SCREEN, URINE: ABNORMAL
ANION GAP SERPL CALCULATED.3IONS-SCNC: 12 MMOL/L (ref 3–16)
ANISOCYTOSIS: ABNORMAL
AST SERPL-CCNC: 14 U/L (ref 15–37)
BARBITURATE SCREEN URINE: ABNORMAL
BASOPHILS ABSOLUTE: 0.1 K/UL (ref 0–0.2)
BASOPHILS RELATIVE PERCENT: 2.6 %
BENZODIAZEPINE SCREEN, URINE: ABNORMAL
BILIRUB SERPL-MCNC: 0.5 MG/DL (ref 0–1)
BILIRUBIN DIRECT: <0.2 MG/DL (ref 0–0.3)
BILIRUBIN URINE: NEGATIVE
BILIRUBIN, INDIRECT: ABNORMAL MG/DL (ref 0–1)
BLOOD, URINE: NEGATIVE
BUN BLDV-MCNC: 6 MG/DL (ref 7–20)
CALCIUM SERPL-MCNC: 9.2 MG/DL (ref 8.3–10.6)
CANNABINOID SCREEN URINE: POSITIVE
CHLORIDE BLD-SCNC: 101 MMOL/L (ref 99–110)
CLARITY: CLEAR
CO2: 24 MMOL/L (ref 21–32)
COCAINE METABOLITE SCREEN URINE: ABNORMAL
COLOR: YELLOW
CREAT SERPL-MCNC: 1 MG/DL (ref 0.9–1.3)
CRENATED RBC'S: ABNORMAL
EOSINOPHILS ABSOLUTE: 0.1 K/UL (ref 0–0.6)
EOSINOPHILS RELATIVE PERCENT: 1 %
EPITHELIAL CELLS, UA: 0 /HPF (ref 0–5)
GFR AFRICAN AMERICAN: >60
GFR NON-AFRICAN AMERICAN: >60
GLUCOSE BLD-MCNC: 122 MG/DL (ref 70–99)
GLUCOSE URINE: NEGATIVE MG/DL
HCT VFR BLD CALC: 39 % (ref 40.5–52.5)
HEMOGLOBIN: 13.1 G/DL (ref 13.5–17.5)
HYALINE CASTS: 0 /LPF (ref 0–8)
KETONES, URINE: NEGATIVE MG/DL
LEUKOCYTE ESTERASE, URINE: ABNORMAL
LIPASE: 17 U/L (ref 13–60)
LYMPHOCYTES ABSOLUTE: 1.4 K/UL (ref 1–5.1)
LYMPHOCYTES RELATIVE PERCENT: 26.7 %
Lab: ABNORMAL
MCH RBC QN AUTO: 30 PG (ref 26–34)
MCHC RBC AUTO-ENTMCNC: 33.5 G/DL (ref 31–36)
MCV RBC AUTO: 89.5 FL (ref 80–100)
METHADONE SCREEN, URINE: ABNORMAL
MICROSCOPIC EXAMINATION: YES
MONOCYTES ABSOLUTE: 0.4 K/UL (ref 0–1.3)
MONOCYTES RELATIVE PERCENT: 7.3 %
NEUTROPHILS ABSOLUTE: 3.3 K/UL (ref 1.7–7.7)
NEUTROPHILS RELATIVE PERCENT: 62.4 %
NITRITE, URINE: NEGATIVE
OPIATE SCREEN URINE: ABNORMAL
OXYCODONE URINE: ABNORMAL
PDW BLD-RTO: 15.4 % (ref 12.4–15.4)
PH UA: 6.5
PH UA: 6.5 (ref 5–8)
PHENCYCLIDINE SCREEN URINE: ABNORMAL
PLATELET # BLD: 299 K/UL (ref 135–450)
PLATELET SLIDE REVIEW: ADEQUATE
PMV BLD AUTO: 8.3 FL (ref 5–10.5)
POIKILOCYTES: ABNORMAL
POLYCHROMASIA: ABNORMAL
POTASSIUM REFLEX MAGNESIUM: 4.2 MMOL/L (ref 3.5–5.1)
PROPOXYPHENE SCREEN: ABNORMAL
PROTEIN UA: NEGATIVE MG/DL
RBC # BLD: 4.36 M/UL (ref 4.2–5.9)
RBC UA: 0 /HPF (ref 0–4)
SODIUM BLD-SCNC: 137 MMOL/L (ref 136–145)
SPECIFIC GRAVITY UA: <=1.005 (ref 1–1.03)
TARGET CELLS: ABNORMAL
TOTAL PROTEIN: 6.6 G/DL (ref 6.4–8.2)
TSH REFLEX: 0.53 UIU/ML (ref 0.27–4.2)
URINE REFLEX TO CULTURE: ABNORMAL
URINE TYPE: ABNORMAL
UROBILINOGEN, URINE: 0.2 E.U./DL
WBC # BLD: 5.3 K/UL (ref 4–11)
WBC UA: 1 /HPF (ref 0–5)

## 2022-03-22 PROCEDURE — 2580000003 HC RX 258: Performed by: NURSE PRACTITIONER

## 2022-03-22 PROCEDURE — 93005 ELECTROCARDIOGRAM TRACING: CPT | Performed by: NURSE PRACTITIONER

## 2022-03-22 PROCEDURE — 6360000002 HC RX W HCPCS: Performed by: NURSE PRACTITIONER

## 2022-03-22 PROCEDURE — 80048 BASIC METABOLIC PNL TOTAL CA: CPT

## 2022-03-22 PROCEDURE — 84443 ASSAY THYROID STIM HORMONE: CPT

## 2022-03-22 PROCEDURE — 96374 THER/PROPH/DIAG INJ IV PUSH: CPT

## 2022-03-22 PROCEDURE — 6370000000 HC RX 637 (ALT 250 FOR IP): Performed by: NURSE PRACTITIONER

## 2022-03-22 PROCEDURE — 2580000003 HC RX 258: Performed by: INTERNAL MEDICINE

## 2022-03-22 PROCEDURE — 80307 DRUG TEST PRSMV CHEM ANLYZR: CPT

## 2022-03-22 PROCEDURE — 83690 ASSAY OF LIPASE: CPT

## 2022-03-22 PROCEDURE — 80076 HEPATIC FUNCTION PANEL: CPT

## 2022-03-22 PROCEDURE — 96375 TX/PRO/DX INJ NEW DRUG ADDON: CPT

## 2022-03-22 PROCEDURE — 96376 TX/PRO/DX INJ SAME DRUG ADON: CPT

## 2022-03-22 PROCEDURE — 74177 CT ABD & PELVIS W/CONTRAST: CPT

## 2022-03-22 PROCEDURE — 81001 URINALYSIS AUTO W/SCOPE: CPT

## 2022-03-22 PROCEDURE — 96372 THER/PROPH/DIAG INJ SC/IM: CPT

## 2022-03-22 PROCEDURE — 85025 COMPLETE CBC W/AUTO DIFF WBC: CPT

## 2022-03-22 PROCEDURE — 99284 EMERGENCY DEPT VISIT MOD MDM: CPT

## 2022-03-22 PROCEDURE — 1200000000 HC SEMI PRIVATE

## 2022-03-22 PROCEDURE — 6360000004 HC RX CONTRAST MEDICATION: Performed by: NURSE PRACTITIONER

## 2022-03-22 PROCEDURE — 2500000003 HC RX 250 WO HCPCS: Performed by: NURSE PRACTITIONER

## 2022-03-22 RX ORDER — LORAZEPAM 2 MG/ML
1 INJECTION INTRAMUSCULAR ONCE
Status: COMPLETED | OUTPATIENT
Start: 2022-03-22 | End: 2022-03-23

## 2022-03-22 RX ORDER — CAPSAICIN 0.025 %
CREAM (GRAM) TOPICAL ONCE
Status: COMPLETED | OUTPATIENT
Start: 2022-03-22 | End: 2022-03-22

## 2022-03-22 RX ORDER — 0.9 % SODIUM CHLORIDE 0.9 %
1000 INTRAVENOUS SOLUTION INTRAVENOUS ONCE
Status: COMPLETED | OUTPATIENT
Start: 2022-03-22 | End: 2022-03-22

## 2022-03-22 RX ORDER — KETAMINE HYDROCHLORIDE 50 MG/ML
50 INJECTION, SOLUTION, CONCENTRATE INTRAMUSCULAR; INTRAVENOUS ONCE
Status: COMPLETED | OUTPATIENT
Start: 2022-03-22 | End: 2022-03-22

## 2022-03-22 RX ORDER — SODIUM CHLORIDE 9 MG/ML
25 INJECTION, SOLUTION INTRAVENOUS PRN
Status: DISCONTINUED | OUTPATIENT
Start: 2022-03-22 | End: 2022-03-25 | Stop reason: HOSPADM

## 2022-03-22 RX ORDER — ONDANSETRON 2 MG/ML
4 INJECTION INTRAMUSCULAR; INTRAVENOUS EVERY 30 MIN PRN
Status: COMPLETED | OUTPATIENT
Start: 2022-03-22 | End: 2022-03-22

## 2022-03-22 RX ORDER — POTASSIUM CHLORIDE 7.45 MG/ML
10 INJECTION INTRAVENOUS PRN
Status: DISCONTINUED | OUTPATIENT
Start: 2022-03-22 | End: 2022-03-22 | Stop reason: SDUPTHER

## 2022-03-22 RX ORDER — ACETAMINOPHEN 650 MG/1
650 SUPPOSITORY RECTAL EVERY 6 HOURS PRN
Status: DISCONTINUED | OUTPATIENT
Start: 2022-03-22 | End: 2022-03-25 | Stop reason: HOSPADM

## 2022-03-22 RX ORDER — SODIUM CHLORIDE 0.9 % (FLUSH) 0.9 %
10 SYRINGE (ML) INJECTION EVERY 12 HOURS SCHEDULED
Status: DISCONTINUED | OUTPATIENT
Start: 2022-03-22 | End: 2022-03-25 | Stop reason: HOSPADM

## 2022-03-22 RX ORDER — POTASSIUM CHLORIDE 7.45 MG/ML
10 INJECTION INTRAVENOUS PRN
Status: DISCONTINUED | OUTPATIENT
Start: 2022-03-22 | End: 2022-03-25 | Stop reason: HOSPADM

## 2022-03-22 RX ORDER — POTASSIUM CHLORIDE 20 MEQ/1
40 TABLET, EXTENDED RELEASE ORAL PRN
Status: DISCONTINUED | OUTPATIENT
Start: 2022-03-22 | End: 2022-03-25 | Stop reason: HOSPADM

## 2022-03-22 RX ORDER — OXYCODONE HYDROCHLORIDE AND ACETAMINOPHEN 5; 325 MG/1; MG/1
1 TABLET ORAL ONCE
Status: COMPLETED | OUTPATIENT
Start: 2022-03-22 | End: 2022-03-22

## 2022-03-22 RX ORDER — MAGNESIUM SULFATE IN WATER 40 MG/ML
2000 INJECTION, SOLUTION INTRAVENOUS PRN
Status: DISCONTINUED | OUTPATIENT
Start: 2022-03-22 | End: 2022-03-25 | Stop reason: HOSPADM

## 2022-03-22 RX ORDER — SODIUM CHLORIDE, SODIUM LACTATE, POTASSIUM CHLORIDE, CALCIUM CHLORIDE 600; 310; 30; 20 MG/100ML; MG/100ML; MG/100ML; MG/100ML
INJECTION, SOLUTION INTRAVENOUS CONTINUOUS
Status: DISCONTINUED | OUTPATIENT
Start: 2022-03-22 | End: 2022-03-25 | Stop reason: HOSPADM

## 2022-03-22 RX ORDER — ACETAMINOPHEN 325 MG/1
650 TABLET ORAL EVERY 6 HOURS PRN
Status: DISCONTINUED | OUTPATIENT
Start: 2022-03-22 | End: 2022-03-25 | Stop reason: HOSPADM

## 2022-03-22 RX ORDER — SODIUM CHLORIDE 0.9 % (FLUSH) 0.9 %
10 SYRINGE (ML) INJECTION PRN
Status: DISCONTINUED | OUTPATIENT
Start: 2022-03-22 | End: 2022-03-25 | Stop reason: HOSPADM

## 2022-03-22 RX ORDER — HALOPERIDOL 5 MG/ML
5 INJECTION INTRAMUSCULAR ONCE
Status: DISCONTINUED | OUTPATIENT
Start: 2022-03-22 | End: 2022-03-22

## 2022-03-22 RX ADMIN — IOPAMIDOL 75 ML: 755 INJECTION, SOLUTION INTRAVENOUS at 15:19

## 2022-03-22 RX ADMIN — SODIUM CHLORIDE 1000 ML: 9 INJECTION, SOLUTION INTRAVENOUS at 15:36

## 2022-03-22 RX ADMIN — ONDANSETRON 4 MG: 2 INJECTION INTRAMUSCULAR; INTRAVENOUS at 16:20

## 2022-03-22 RX ADMIN — OXYCODONE AND ACETAMINOPHEN 1 TABLET: 5; 325 TABLET ORAL at 17:11

## 2022-03-22 RX ADMIN — CAPSAICIN: 0.25 CREAM TOPICAL at 19:27

## 2022-03-22 RX ADMIN — KETAMINE HYDROCHLORIDE 50 MG: 50 INJECTION, SOLUTION INTRAMUSCULAR; INTRAVENOUS at 23:34

## 2022-03-22 RX ADMIN — ONDANSETRON 4 MG: 2 INJECTION INTRAMUSCULAR; INTRAVENOUS at 15:36

## 2022-03-22 RX ADMIN — HYDROMORPHONE HYDROCHLORIDE 0.5 MG: 1 INJECTION, SOLUTION INTRAMUSCULAR; INTRAVENOUS; SUBCUTANEOUS at 15:38

## 2022-03-22 RX ADMIN — SODIUM CHLORIDE, POTASSIUM CHLORIDE, SODIUM LACTATE AND CALCIUM CHLORIDE: 600; 310; 30; 20 INJECTION, SOLUTION INTRAVENOUS at 21:20

## 2022-03-22 RX ADMIN — HYDROMORPHONE HYDROCHLORIDE 0.5 MG: 1 INJECTION, SOLUTION INTRAMUSCULAR; INTRAVENOUS; SUBCUTANEOUS at 16:20

## 2022-03-22 RX ADMIN — TRIMETHOBENZAMIDE HYDROCHLORIDE 200 MG: 100 INJECTION INTRAMUSCULAR at 19:23

## 2022-03-22 RX ADMIN — Medication 25 MG: at 23:58

## 2022-03-22 ASSESSMENT — ENCOUNTER SYMPTOMS
BACK PAIN: 0
ABDOMINAL PAIN: 1
RESPIRATORY NEGATIVE: 1
NAUSEA: 1
CHEST TIGHTNESS: 0
VOMITING: 1
EYE PAIN: 0
COUGH: 0
DIARRHEA: 0
SORE THROAT: 0
WHEEZING: 0
SHORTNESS OF BREATH: 0

## 2022-03-22 ASSESSMENT — PAIN DESCRIPTION - LOCATION
LOCATION: ABDOMEN

## 2022-03-22 ASSESSMENT — PAIN SCALES - GENERAL
PAINLEVEL_OUTOF10: 7
PAINLEVEL_OUTOF10: 8
PAINLEVEL_OUTOF10: 7
PAINLEVEL_OUTOF10: 9
PAINLEVEL_OUTOF10: 8

## 2022-03-22 ASSESSMENT — PAIN DESCRIPTION - ORIENTATION
ORIENTATION: LOWER
ORIENTATION: LOWER

## 2022-03-22 ASSESSMENT — PAIN DESCRIPTION - ONSET
ONSET: ON-GOING
ONSET: ON-GOING

## 2022-03-22 ASSESSMENT — PAIN DESCRIPTION - PAIN TYPE
TYPE: ACUTE PAIN

## 2022-03-22 ASSESSMENT — PAIN DESCRIPTION - PROGRESSION
CLINICAL_PROGRESSION: NOT CHANGED
CLINICAL_PROGRESSION: NOT CHANGED

## 2022-03-22 ASSESSMENT — PAIN DESCRIPTION - FREQUENCY
FREQUENCY: CONTINUOUS

## 2022-03-22 ASSESSMENT — PAIN DESCRIPTION - DESCRIPTORS: DESCRIPTORS: ACHING

## 2022-03-22 NOTE — ED PROVIDER NOTES
I have personally performed a face to face diagnostic evaluation on this patient. I have fully participated in the care of this patient I personally saw the patient and performed a substantive portion of the visit including all aspects of the medical decision making. I have reviewed and agree with all pertinent clinical information including history, physical exam, diagnostic tests, and the plan. HPI: Marie Ibarra presented with intermittent abdominal pain similar to previous. Started yesterday. Associated with intractable nausea vomiting. Also having generalized abdominal pain. Similar to previous. Patient has history of multiple prior abdominal surgeries and prior abdominal obstructions. Patient has had splenectomy and left nephrectomy. Still has his gallbladder. Patient also has history of prior TIA. Patient has frequent bradycardia and follows with cardiology and has a loop monitor in place. Upon my entry into the case patient has been given multiple doses of pain medication as well as nausea medication. Vitals otherwise stable at this time. See PEDRO note for further details in HPI. Chief Complaint   Patient presents with    Abdominal Pain     patient with hx of bowel obstructions patient started with pain yesterday. patient with vomiting. Review of Systems: See PEDRO note  Vital Signs: /87   Pulse 82   Temp 97.7 °F (36.5 °C) (Oral)   Resp 18   Ht 6' (1.829 m)   Wt 212 lb 15.4 oz (96.6 kg)   SpO2 98%   BMI 28.88 kg/m²     Alert 49 y.o. male who does not appear toxic or acutely ill  HENT: Atraumatic, oral mucosa moist  Neck: Grossly normal ROM  Chest/Lungs: respiratory effort normal, bradycardia abdomen: Soft nontender  Musculoskeletal: Grossly normal ROM  Skin: No palor or diaphoresis    Medical Decision Making and Plan:  Pertinent Labs & Imaging studies reviewed. (See PEDRO chart for details)  I agree with assessment and plan.   Patient has significant bradycardia with heart rate around 40. Patient is received multiple doses of narcotics as well as antiemetics. QT is 472 corrected  however given patient's continued nausea vomiting and patient failed p.o. challenge will admit for further work-up. Labs relatively unchanged from previous CT scan show significant postoperative changes with no acute findings at this time. See PEDRO note for further details. EKG Interpretation    Interpreted by emergency department physician    Rhythm: sinus bradycardia  Rate: 41  Axis: normal  Ectopy: none  Conduction: normal  ST Segments: normal  T Waves: normal  Q Waves: none    Clinical Impression: Sinus bradycardia.   LA interval 156 QRS duration 86 ventricular rate 41.     MD Manisha Kong MD  03/22/22 5415

## 2022-03-22 NOTE — PROGRESS NOTES
Medication Reconciliation    List of medications patient is currently taking is complete. Source of information: 1. Conversation with patient at bedside                                      2. EPIC records      Allergies  Nsaids, Morphine, Prochlorperazine, Valtrex [valacyclovir hcl], Fentanyl, Morphine and related, and Tolmetin     Notes regarding home medications:   1. Patient received all of his morning home medications prior to arrival to the emergency department today.     Emmett Pan [facility], PharmD, BCPS  3/22/2022 5:21 PM

## 2022-03-22 NOTE — ED PROVIDER NOTES
[address] [facility]        Pt Name: Jon Encarnacion  MRN: [id number]  [place] 1970  Date of evaluation: 3/22/2022  Provider: LILLIE Rodriguez - ELEAZAR  PCP: Mackenzie Alan MD  Note Started: 2:17 PM EDT        I have seen and evaluated this patient with my supervising physician Mylene Simmonds, MD.    [address] [place] [address]       Chief Complaint   Patient presents with    Abdominal Pain     patient with hx of bowel obstructions patient started with pain yesterday. patient with vomiting. HISTORY OF PRESENT ILLNESS   (Location, Timing/Onset, Context/Setting, Quality, Duration, Modifying Factors, Severity, Associated Signs and Symptoms)  Note limiting factors. Chief Complaint: Abdominal pain, \"feels like a small bowel obstruction\"    Jon Encarnacion is a 49 y.o. male who presents to the emergency department with complaints of abdominal pain nausea and vomiting. He states that it started yesterday, has been intermittent. He did have a bowel movement yesterday but states that since then he has not had a bowel movement and he is passing no flatus at this point in time since last night. He has a history of multiple small bowel obstructions from a history of gastric bypass and the bowel obstructions are secondary to adhesions and scar tissue. States that it feels similar to the times he previously had an obstruction. Pain is a 10 on 10. No fevers or chills. No urinary complaints. Came to the emergency department for further evaluation and treatment. Nursing Notes were all reviewed and agreed with or any disagreements were addressed in the HPI. REVIEW OF SYSTEMS    (2-9 systems for level 4, 10 or more for level 5)     Review of Systems   Constitutional: Negative for chills, fatigue and fever. HENT: Negative for congestion and sore throat. Eyes: Negative for pain and visual disturbance. Respiratory: Negative. Negative for cough, chest tightness, shortness of breath and wheezing. Cardiovascular: Negative. Negative for chest pain and leg swelling. Gastrointestinal: Positive for abdominal pain, nausea and vomiting. Negative for diarrhea. Genitourinary: Negative for dysuria and hematuria. Musculoskeletal: Negative for back pain, myalgias, neck pain and neck stiffness. Skin: Negative for rash and wound. Allergic/Immunologic: Negative for immunocompromised state. Neurological: Negative for dizziness and light-headedness. All other systems reviewed and are negative. Positives and Pertinent negatives as per HPI. Except as noted above in the ROS, all other systems were reviewed and negative.        PAST MEDICAL HISTORY     Past Medical History:   Diagnosis Date    Alcoholism [facility])     last drink 2015    Allergic migraine with status migrainosus     Allergic rhinitis, seasonal     Anemia     Arthritis     right knee    Vaughn's esophagus     Benign intracranial hypertension     Cancer (HCC)     renal     Carpal tunnel syndrome     Chronic pain     Depression     GERD (gastroesophageal reflux disease)     Headache(784.0)     History of blood transfusion     History of tobacco use     Quit 7/2014    Migraine     chronic for 1 year    Morbid obesity ([address]     Movement disorder     Onychomycosis     Sleep apnea, obstructive     Severe uses cpap stop bang 6    Unspecified cerebral artery occlusion with cerebral infarction 2014    slight weakness in left arm    Use of cane as ambulatory aid     Wears dentures     full set    Wears glasses          SURGICAL HISTORY     Past Surgical History:   Procedure Laterality Date    ABDOMEN SURGERY      gastric bypass    ABDOMEN SURGERY  4-7-2016    repair of recurrent incisional hernia with mesh, removal of old mesh, bilateral component separation    ABDOMINAL EXPLORATION SURGERY  1/11/16    exp lap, lysis of adhesions, small bowel resection    CARPAL TUNNEL RELEASE      bilat    DILATATION, ESOPHAGUS      ENDOSCOPY, COLON, DIAGNOSTIC      EYE SURGERY      GASTRIC BYPASS SURGERY  Jan 2009    Has lost about 200 pounds.  HERNIA REPAIR  3-    ventral    JOINT REPLACEMENT      KIDNEY REMOVAL Left 08/01/2018    KNEE ARTHROSCOPY Right 7/11/2013    Dr.Robert WaltersAdelina     KNEE ARTHROSCOPY Right 7/11/12    RIGHT KNEE ARTHROSCOPY WITH CHONDROPLASTY    KNEE SURGERY  July 2012    right, arthroscopy    KNEE SURGERY Right 2/18/2020    INCISION AND DRAINAGE RIGHT KNEE AND WOULD VAC PLACEMENT performed by Krisetl Bravo MD at . [facility] Right 2/26/2021    INCISION AND DRAINAGE OF RIGHT KNEE HEMATOMA performed by Iriada De Dios MD at . [facility] Right 3/5/2021    INCISION AND DRAINAGE AND CLOSURE RIGHT TOTAL KNEE performed by Iraida De Dios MD at [address]  2005    OTHER SURGICAL HISTORY Left 03/08/2016    CT biopsy ablasion left kidney    OTHER SURGICAL HISTORY  2016    small intestine 12 inch removed, 2 hernia surgeries, another surgery to drain infection from incision.      REVISION TOTAL KNEE ARTHROPLASTY Right 12/17/2019    RIGHT REVISION TIBIA TOTAL KNEE REPLACEMENT performed by Kristel Bravo MD at [address] Right 1/22/2020    RIGHT KNEE IRRIGATION AND DEBRIDEMENT WITH POLY EXCHANGE performed by July Brown MD at [facility] Right 2/16/2021    RIGHT REMOVAL OF EXPLANT AND TOTAL KNEE REPLACEMENT [facility]    RIGHT    TOTAL KNEE ARTHROPLASTY Right 8/12/2020    RIGHT ARTHROPLASY  RESECTION WITH INSERTION OF SPACER performed by Kristel Bravo MD at [address] [facility]  6-7-2016    UPPER GASTROINTESTINAL ENDOSCOPY  04/02/2018         CURRENTMEDICATIONS       Previous Medications    ATORVASTATIN (LIPITOR) 40 MG TABLET    TAKE 1 TABLET BY MOUTH EVERYDAY AT BEDTIME    CALCIUM-VITAMIN D (OSCAL 500/200 D-3) 500-200 MG-UNIT PER TABLET    Take 1 tablet by mouth 2 times daily     CLOPIDOGREL (PLAVIX) 75 MG TABLET    Take 1 tablet by mouth daily    DICYCLOMINE (BENTYL) 10 MG CAPSULE    Take 1 capsule by mouth every 6 hours as needed (cramps)    FLUOXETINE (PROZAC) 20 MG CAPSULE    Take 1 capsule by mouth daily    GABAPENTIN (NEURONTIN) 600 MG TABLET    Take 600 mg by mouth 4 times daily.      MULTIPLE VITAMIN TABS    Take 1 tablet by mouth daily     ONDANSETRON (ZOFRAN ODT) 4 MG DISINTEGRATING TABLET    Take 1 tablet by mouth every 8 hours as needed for Nausea    PANTOPRAZOLE (PROTONIX) 40 MG TABLET    Take 1 tablet by mouth 2 times daily    SILDENAFIL (REVATIO) 20 MG TABLET    Take 4 tablets by mouth as needed (30 min prior to intercourse)    TRAZODONE (DESYREL) 100 MG TABLET    Take 2 tablets by mouth nightly         ALLERGIES     Nsaids, Morphine, Prochlorperazine, Valtrex [valacyclovir hcl], Fentanyl, Morphine and related, and Tolmetin    FAMILYHISTORY       Family History   Problem Relation Age of Onset    Cancer Father         Lymphoma    Arthritis Mother     Heart Disease Maternal Uncle     Heart Disease Maternal Uncle     Diabetes Maternal Uncle           SOCIAL HISTORY       Social History     Tobacco Use    Smoking status: Former Smoker     Packs/day: 0.50     Years: 25.00     Pack years: 12.50     Types: Cigarettes     Start date: 1985     Quit date: 2021     Years since quittin.8    Smokeless tobacco: Never Used    Tobacco comment: Patient vapes   Vaping Use    Vaping Use: Never used   Substance Use Topics    Alcohol use: No     Alcohol/week: 0.0 standard drinks     Comment: last drink     Drug use: Yes     Types: Marijuana Jayesh Cooper)     Comment: medical [facility] card       SCREENINGS    Frankford Coma Scale  Eye Opening: Spontaneous  Best Verbal Response: Oriented  Best Motor Response: Obeys commands  Nohemy Coma Scale Score: 15        PHYSICAL EXAM    (up to 7 for level 4, 8 or more for level 5)     ED Triage Vitals [03/22/22 1359]   BP Temp Temp Source Pulse Resp SpO2 Height Weight   133/82 97.7 °F (36.5 °C) Oral 82 18 98 % -- 212 lb 15.4 oz (96.6 kg)       Physical Exam  Vitals and nursing note reviewed. Constitutional:       Appearance: Normal appearance. He is not ill-appearing, toxic-appearing or diaphoretic. Comments: Appears very uncomfortable   HENT:      Head: Normocephalic and atraumatic. No raccoon eyes, Roman's sign, right periorbital erythema or left periorbital erythema. Right Ear: Hearing and external ear normal.      Left Ear: Hearing and external ear normal.      Nose: Nose normal. No laceration, nasal tenderness, mucosal edema, congestion or rhinorrhea. Right Nostril: No epistaxis. Left Nostril: No epistaxis. Mouth/Throat:      Lips: Pink. No lesions. Mouth: Mucous membranes are moist.      Tongue: No lesions. Tongue does not deviate from midline. Pharynx: Oropharynx is clear. Uvula midline. No pharyngeal swelling, oropharyngeal exudate, posterior oropharyngeal erythema or uvula swelling. Tonsils: No tonsillar exudate or tonsillar abscesses. Eyes:      General: Lids are normal.         Right eye: No discharge. Left eye: No discharge. Extraocular Movements: Extraocular movements intact. Neck:      Trachea: Phonation normal. No abnormal tracheal secretions or tracheal deviation. Comments: No meningismus   Cardiovascular:      Rate and Rhythm: Normal rate and regular rhythm. Pulses: Normal pulses. Heart sounds: Normal heart sounds. No murmur heard. No friction rub. No gallop. Pulmonary:      Effort: Pulmonary effort is normal. No respiratory distress. Breath sounds: Normal breath sounds. No stridor. No wheezing, rhonchi or rales.    Abdominal:      General: Bowel sounds are normal. There is no distension. Palpations: Abdomen is soft. Tenderness: There is generalized abdominal tenderness. There is guarding. There is no right CVA tenderness, left CVA tenderness or rebound. Hernia: No hernia is present. Musculoskeletal:         General: Normal range of motion. Cervical back: Full passive range of motion without pain, normal range of motion and neck supple. No rigidity. No spinous process tenderness or muscular tenderness. Lymphadenopathy:      Cervical: No cervical adenopathy. Skin:     General: Skin is warm and dry. Capillary Refill: Capillary refill takes less than 2 seconds. Neurological:      General: No focal deficit present. Mental Status: He is alert and oriented to person, place, and time. GCS: GCS eye subscore is 4. GCS verbal subscore is 5. GCS motor subscore is 6. Sensory: Sensation is intact. Motor: Motor function is intact. Gait: Gait is intact.    Psychiatric:         Mood and Affect: Mood normal.         Behavior: Behavior normal.         DIAGNOSTIC RESULTS   LABS:    Labs Reviewed   CBC WITH AUTO DIFFERENTIAL - Abnormal; Notable for the following components:       Result Value    Hemoglobin 13.1 (*)     Hematocrit 39.0 (*)     Anisocytosis Occasional (*)     Polychromasia Occasional (*)     Poikilocytes 1+ (*)     Acanthocytes Occasional (*)     CRENATED RBC'S 1+ (*)     Target Cells Occasional (*)     All other components within normal limits   BASIC METABOLIC PANEL W/ REFLEX TO MG FOR LOW K - Abnormal; Notable for the following components:    Glucose 122 (*)     BUN 6 (*)     All other components within normal limits   HEPATIC FUNCTION PANEL - Abnormal; Notable for the following components:    AST 14 (*)     All other components within normal limits   URINALYSIS WITH REFLEX TO CULTURE - Abnormal; Notable for the following components:    Leukocyte Esterase, Urine TRACE (*)     All other components within normal limits   LIPASE (36.5 °C)    TempSrc: Oral    SpO2: 98%    Weight: 212 lb 15.4 oz (96.6 kg) 212 lb 15.4 oz (96.6 kg)   Height:  6' (1.829 m)       Patient was given the following medications:  Medications   capsaicin (ZOSTRIX) 0.025 % cream (has no administration in time range)   trimethobenzamide (TIGAN) injection 200 mg (has no administration in time range)   0.9 % sodium chloride bolus (1,000 mLs IntraVENous New Bag 3/22/22 1536)   ondansetron (ZOFRAN) injection 4 mg (4 mg IntraVENous Given 3/22/22 1620)   HYDROmorphone (DILAUDID) injection 0.5 mg (0.5 mg IntraVENous Given 3/22/22 1620)   iopamidol (ISOVUE-370) 76 % injection 75 mL (75 mLs IntraVENous Given 3/22/22 1519)   oxyCODONE-acetaminophen (PERCOCET) 5-325 MG per tablet 1 tablet (1 tablet Oral Given 3/22/22 1711)           MDM: See HPI and above her full presentation and physical exam.  Differential diagnoses include small bowel obstruction, constipation, gastritis, gastroenteritis, diverticulosis, diverticulitis, intra-abdominal emergency, other    Patient's urine shows trace leukocytes but micro UA is unremarkable. Urine drug screen pending. Patient has no s leukocytosis or significant anemia, hemoglobin 13.1. No significant electrolyte derangements or NANCY. LFTs and lipase are generally unremarkable    CT is read by the radiologist as above    After 2 rounds of antiemetics and pain medication he is still saying that he is in a 7 out of 10 pain in each we tried to p.o. challenge him 3 times and he failed each time, failed even with trying to take the Percocet. However during this time he became significantly bradycardic, although was asymptomatic, is bradycardic into the 40s. Therefore this does limit somewhat what antiemetics he can have, I talked to [facility] the pharmacist.  We will try dose of tea again with capsaicin topically. However given his intractable nausea and vomiting and intractable pain I do believe he needs to be admitted.   I therefore consulted the hospitalist who agreed to admit patient and write orders for admission. FINAL IMPRESSION      1. Intractable nausea and vomiting    2. Generalized abdominal pain    3. Bradycardia          DISPOSITION/PLAN   DISPOSITION Decision To Admit 03/22/2022 05:45:28 PM      PATIENT REFERRED TO:  No follow-up provider specified.     DISCHARGE MEDICATIONS:  New Prescriptions    No medications on file       DISCONTINUED MEDICATIONS:  Discontinued Medications    ZOLEDRONIC ACID (RECLAST) 5 MG/100ML SOLN    Infuse 100 mLs intravenously once for 1 dose              (Please note that portions of this note were completed with a voice recognition program.  Efforts were made to edit the dictations but occasionally words are mis-transcribed.)    LILLIE Kaur CNP (electronically signed)            LILLIE Kaur CNP  03/22/22 [id number]

## 2022-03-23 LAB
ANION GAP SERPL CALCULATED.3IONS-SCNC: 12 MMOL/L (ref 3–16)
BASOPHILS ABSOLUTE: 0.1 K/UL (ref 0–0.2)
BASOPHILS RELATIVE PERCENT: 2.3 %
BUN BLDV-MCNC: 6 MG/DL (ref 7–20)
CALCIUM SERPL-MCNC: 9.1 MG/DL (ref 8.3–10.6)
CHLORIDE BLD-SCNC: 109 MMOL/L (ref 99–110)
CO2: 21 MMOL/L (ref 21–32)
CREAT SERPL-MCNC: 0.9 MG/DL (ref 0.9–1.3)
EKG ATRIAL RATE: 41 BPM
EKG DIAGNOSIS: NORMAL
EKG P AXIS: 73 DEGREES
EKG P-R INTERVAL: 156 MS
EKG Q-T INTERVAL: 472 MS
EKG QRS DURATION: 86 MS
EKG QTC CALCULATION (BAZETT): 389 MS
EKG R AXIS: 59 DEGREES
EKG T AXIS: 35 DEGREES
EKG VENTRICULAR RATE: 41 BPM
EOSINOPHILS ABSOLUTE: 0.5 K/UL (ref 0–0.6)
EOSINOPHILS RELATIVE PERCENT: 8.3 %
GFR AFRICAN AMERICAN: >60
GFR NON-AFRICAN AMERICAN: >60
GLUCOSE BLD-MCNC: 81 MG/DL (ref 70–99)
HCT VFR BLD CALC: 36.9 % (ref 40.5–52.5)
HEMOGLOBIN: 12 G/DL (ref 13.5–17.5)
LYMPHOCYTES ABSOLUTE: 2.7 K/UL (ref 1–5.1)
LYMPHOCYTES RELATIVE PERCENT: 42.2 %
MCH RBC QN AUTO: 29.2 PG (ref 26–34)
MCHC RBC AUTO-ENTMCNC: 32.4 G/DL (ref 31–36)
MCV RBC AUTO: 89.9 FL (ref 80–100)
MONOCYTES ABSOLUTE: 0.6 K/UL (ref 0–1.3)
MONOCYTES RELATIVE PERCENT: 9 %
NEUTROPHILS ABSOLUTE: 2.4 K/UL (ref 1.7–7.7)
NEUTROPHILS RELATIVE PERCENT: 38.2 %
PDW BLD-RTO: 15.8 % (ref 12.4–15.4)
PLATELET # BLD: 287 K/UL (ref 135–450)
PMV BLD AUTO: 7.9 FL (ref 5–10.5)
POTASSIUM REFLEX MAGNESIUM: 4.2 MMOL/L (ref 3.5–5.1)
RBC # BLD: 4.1 M/UL (ref 4.2–5.9)
SODIUM BLD-SCNC: 142 MMOL/L (ref 136–145)
TROPONIN: <0.01 NG/ML
WBC # BLD: 6.4 K/UL (ref 4–11)

## 2022-03-23 PROCEDURE — 36415 COLL VENOUS BLD VENIPUNCTURE: CPT

## 2022-03-23 PROCEDURE — 96375 TX/PRO/DX INJ NEW DRUG ADDON: CPT

## 2022-03-23 PROCEDURE — 2580000003 HC RX 258: Performed by: INTERNAL MEDICINE

## 2022-03-23 PROCEDURE — 93010 ELECTROCARDIOGRAM REPORT: CPT | Performed by: INTERNAL MEDICINE

## 2022-03-23 PROCEDURE — 9990000010 HC NO CHARGE VISIT

## 2022-03-23 PROCEDURE — 99222 1ST HOSP IP/OBS MODERATE 55: CPT | Performed by: NURSE PRACTITIONER

## 2022-03-23 PROCEDURE — G0378 HOSPITAL OBSERVATION PER HR: HCPCS

## 2022-03-23 PROCEDURE — 6360000002 HC RX W HCPCS: Performed by: INTERNAL MEDICINE

## 2022-03-23 PROCEDURE — 96376 TX/PRO/DX INJ SAME DRUG ADON: CPT

## 2022-03-23 PROCEDURE — 1200000000 HC SEMI PRIVATE

## 2022-03-23 PROCEDURE — 85025 COMPLETE CBC W/AUTO DIFF WBC: CPT

## 2022-03-23 PROCEDURE — 6360000002 HC RX W HCPCS: Performed by: NURSE PRACTITIONER

## 2022-03-23 PROCEDURE — 80048 BASIC METABOLIC PNL TOTAL CA: CPT

## 2022-03-23 PROCEDURE — 84484 ASSAY OF TROPONIN QUANT: CPT

## 2022-03-23 RX ADMIN — HYDROMORPHONE HYDROCHLORIDE 0.5 MG: 1 INJECTION, SOLUTION INTRAMUSCULAR; INTRAVENOUS; SUBCUTANEOUS at 23:08

## 2022-03-23 RX ADMIN — Medication 25 MG: at 19:21

## 2022-03-23 RX ADMIN — SODIUM CHLORIDE, PRESERVATIVE FREE 10 ML: 5 INJECTION INTRAVENOUS at 10:58

## 2022-03-23 RX ADMIN — ENOXAPARIN SODIUM 40 MG: 40 INJECTION SUBCUTANEOUS at 10:54

## 2022-03-23 RX ADMIN — HYDROMORPHONE HYDROCHLORIDE 0.5 MG: 1 INJECTION, SOLUTION INTRAMUSCULAR; INTRAVENOUS; SUBCUTANEOUS at 16:13

## 2022-03-23 RX ADMIN — HYDROMORPHONE HYDROCHLORIDE 0.5 MG: 1 INJECTION, SOLUTION INTRAMUSCULAR; INTRAVENOUS; SUBCUTANEOUS at 19:23

## 2022-03-23 RX ADMIN — LORAZEPAM 1 MG: 2 INJECTION INTRAMUSCULAR; INTRAVENOUS at 00:37

## 2022-03-23 ASSESSMENT — PAIN DESCRIPTION - DESCRIPTORS
DESCRIPTORS: ACHING

## 2022-03-23 ASSESSMENT — PAIN SCALES - GENERAL
PAINLEVEL_OUTOF10: 8
PAINLEVEL_OUTOF10: 0
PAINLEVEL_OUTOF10: 8
PAINLEVEL_OUTOF10: 6
PAINLEVEL_OUTOF10: 8
PAINLEVEL_OUTOF10: 0

## 2022-03-23 ASSESSMENT — PAIN DESCRIPTION - ORIENTATION
ORIENTATION: LOWER

## 2022-03-23 ASSESSMENT — PAIN DESCRIPTION - LOCATION
LOCATION: ABDOMEN

## 2022-03-23 ASSESSMENT — PAIN DESCRIPTION - FREQUENCY
FREQUENCY: CONTINUOUS

## 2022-03-23 ASSESSMENT — PAIN - FUNCTIONAL ASSESSMENT
PAIN_FUNCTIONAL_ASSESSMENT: PREVENTS OR INTERFERES SOME ACTIVE ACTIVITIES AND ADLS

## 2022-03-23 ASSESSMENT — PAIN DESCRIPTION - PROGRESSION
CLINICAL_PROGRESSION: NOT CHANGED

## 2022-03-23 ASSESSMENT — PAIN DESCRIPTION - ONSET
ONSET: ON-GOING

## 2022-03-23 ASSESSMENT — PAIN DESCRIPTION - PAIN TYPE
TYPE: ACUTE PAIN

## 2022-03-23 NOTE — PROGRESS NOTES
Occupational Therapy  Pt and nursing reporting pt up in room independently. No OT needs identified. Pt in agreement.   Defer OT eval. Juliette Nip, OTR/L #[id number] 3/23/2022 11:01 AM

## 2022-03-23 NOTE — ED NOTES
Handoff report given to [facility], RN to assume care. Denies further questions.      Amanda Reddy RN  03/22/22 2005

## 2022-03-23 NOTE — PROGRESS NOTES
Pt asking for pain medication for abdominal pain. Both cardiology and GI ok with patient having pain meds. Sent message to MD letting him know that cardiology and GI gave ok for pain medication, awaiting response.  Electronically signed by Jackie Damico RN on 3/23/2022 at 2:30 PM detailed exam EOMI

## 2022-03-23 NOTE — PROGRESS NOTES
Pt arrived to unit at 2115. Pt is alert and oriented X4. Pt oriented to unit and to room. Pt oriented to call light and to phone. White board updated. Pt denies any further needs at this time. Will continue to monitor and assess.      Electronically signed by Terra Kendall RN on 3/22/2022 at 10:30 PM

## 2022-03-23 NOTE — PROGRESS NOTES
Hospitalist Progress Note      PCP: Hood Renner MD    Date of Admission: 3/22/2022    Chief Complaint:   Chief Complaint   Patient presents with    Abdominal Pain     patient with hx of bowel obstructions patient started with pain yesterday. patient with vomiting. Hospital Course:   Patient is a 60-year-old male with past medical history of marijuana abuse who presents to the hospital due to abdominal pain nausea vomiting, mentions for the past 2 days he has not been able to keep down any food or eat or drink. Patient mentions his abdominal pain is epigastric, 10/10 intermittent severity, he mentions he has a history of multiple abdominal surgeries and he has chronic abdominal pains. CT abd/pel: s/p splenectomy, left nephrectomy, gastric surgery. Cholelithiasis but no acute cholecystitis. Diverticulosis without diverticulitis. Some free fluid in the pelvis and a calculus that measures 2.2cm , unchanged. Chronic L1 severe compression injury. Subjective:     Patient continues with abdominal pain and endorses dry heaving this morning during my exam. He is frustrated not receiving any pain medication because \"his heart rate is always low\".  He does tell me that he recently received IV ketamine infusion from his pain management specialist. I discussed GI consultation for his N/V/abdominal pain and he is agreeable - states he is supposed to have multiple scopes done in May    Spoke with Carolina [place] with GI - plans for EGD tomorrow       Medications:  Reviewed    Infusion Medications    lactated ringers Stopped (03/22/22 9146)    sodium chloride       Scheduled Medications    sodium chloride flush  10 mL IntraVENous 2 times per day    enoxaparin  40 mg SubCUTAneous Daily     PRN Meds: sodium chloride flush, sodium chloride, potassium chloride **OR** potassium alternative oral replacement **OR** potassium chloride, magnesium sulfate, magnesium hydroxide, acetaminophen **OR** acetaminophen, promethazine      Intake/Output Summary (Last 24 hours) at 3/23/2022 0914  Last data filed at 3/23/2022 0043  Gross per 24 hour   Intake 119.71 ml   Output --   Net 119.71 ml       Physical Exam Performed:    /69   Pulse (!) 46   Temp 97.9 °F (36.6 °C) (Oral)   Resp 16   Ht 6' (1.829 m)   Wt 210 lb 5.1 oz (95.4 kg)   SpO2 92%   BMI 28.52 kg/m²     General appearance: No apparent distress, appears older than stated age and cooperative. HEENT: Pupils equal, round, and reactive to light. Conjunctivae/corneas clear. Neck: Supple, with full range of motion. No jugular venous distention. Trachea midline. Respiratory:  Normal respiratory effort. Clear to auscultation, bilaterally without Rales/Wheezes/Rhonchi. Cardiovascular: Slow rate and regular rhythm with normal S1/S2 without murmurs, rubs or gallops. Abdomen: Soft, tenderness throughout, non-distended with normal bowel sounds. Musculoskeletal: No clubbing, cyanosis or edema bilaterally. Full range of motion without deformity. Skin: Skin color, texture, turgor normal.  No rashes or lesions. Neurologic:  Neurovascularly intact without any focal sensory/motor deficits. Cranial nerves: II-XII intact, grossly non-focal.  Psychiatric: Alert and oriented, thought content appropriate, normal insight  Capillary Refill: Brisk,< 3 seconds   Peripheral Pulses: +2 palpable, equal bilaterally       Labs:   Recent Labs     03/22/22  1418 03/23/22  0532   WBC 5.3 6.4   HGB 13.1* 12.0*   HCT 39.0* 36.9*    287     Recent Labs     03/22/22  1418 03/23/22  0532    142   K 4.2 4.2    109   CO2 24 21   BUN 6* 6*   CREATININE 1.0 0.9   CALCIUM 9.2 9.1     Recent Labs     03/22/22  1418   AST 14*   ALT 10   BILIDIR <0.2   BILITOT 0.5   ALKPHOS 110     No results for input(s): INR in the last 72 hours. No results for input(s): Lucio Desouza in the last 72 hours.     Urinalysis:      Lab Results   Component Value Date    NITRU Negative 03/22/2022 WBCUA 1 03/22/2022    RBCUA 0 03/22/2022    BLOODU Negative 03/22/2022    SPECGRAV <=1.005 03/22/2022    GLUCOSEU Negative 03/22/2022    GLUCOSEU NEGATIVE 02/07/2011       Radiology:  CT ABDOMEN PELVIS W IV CONTRAST Additional Contrast? None   Final Result   1. Status post splenectomy and left nephrectomy. 2. Cholelithiasis but no acute cholecystitis. 3. Status post gastric surgery with no obvious complications. 4. Diverticulosis but no acute diverticulitis. 5. Some free fluid in the pelvis and a calculus which measures 2.2 cm also   previously seen and appears unchanged. 6. Chronic L1 severe compression injury. Assessment/Plan:    Active Hospital Problems    Diagnosis     Intractable nausea and vomiting [R11.2]      Intractable nausea, vomiting, and abdominal pain, unclear etiology. Hx of several abdominal surgeries. - CT abd/pel: s/p splenectomy, left nephrectomy, gastric surgery. Cholelithiasis but no acute cholecystitis. Diverticulosis without diverticulitis. Some free fluid in the pelvis and a calculus that measures 2.2cm , unchanged. Chronic L1 severe compression injury. - UA negative  - Antiemetics and analgesia as needed  - GI consulted for abd pain - plan for EGD tomorrow  - UDS with cannabinoids - cannabinoid hyperemesis syndrome? Discussed this with the patient  - Had ketamine infusion with Dr. Jennifer Voss, pain management on 03/15/2022  - The patient states that he has plans for upper endoscopy, colonoscopy, endoscopic ultrasound and/or ERCP with Dr. Dariela Almaguer in May    Marked bradycardia  - Cardiology consulted, thank you for recs, will defer to them for additional testing required  - TSH normal  - Tele monitoring    Hx of OSCAR, on CPAP at home. - Continue    Anxiety/depression, mood is stable. - Holding home medications for now given bradycardia    GERD - w/out active signs/sxs of dysphagia/odynophagia. No evidence of active PUD or hx of GI bleed.  Controlled on home PPI - continue           DVT Prophylaxis: lovenox  Diet: Diet NPO  Code Status: Full Code    PT/OT Eval Status: not ordered    Dispo - pending GI eval, home tmrw? Tavia Mensah.  Marco Rico - NP

## 2022-03-23 NOTE — ED NOTES
Verbal report given to Ade Baird RN all questions and concerns addressed     Kane Ragland, RN  03/22/22 2034

## 2022-03-23 NOTE — PROGRESS NOTES
Patient A&O. No complaints at this time. Pt given PRN medication. IV fluid infusing. PRN medication given for nausea. Pt's wife is at bedside. Call light within reach, able to make needs knows, fall precautions in place. Will continue to monitor. Electronically signed by Mali Holden RN on 3/23/2022 at 6:20 PM

## 2022-03-23 NOTE — PROGRESS NOTES
BEDSIDE eval: pt angry b/c pain and nausea not being controlled. He deals with intermittent intractable abdominal pain w/ n/v.  Pt has not slept in 2 days. He wants to leave AMA. On my eval: pt does seem quite frustrated and bewildered. He is cooperative with myself. He is hemodynamically stable, although bradycardic. This is also a chronic problem for him as well. CT abd/pelv and lab work thus far unremarkable. Plan: Ketamine 50mg IM x1 dose which will be given by myself. One hour after Ketamine pt can receive Ativan 1mg IVP x1 and Phenergan 25mg IVPB q6 prn. Plan discussed with pt and wife. Patient very receptive and agreeable. Pt has tele orders. I discussed the Ketamine pain protocol in place with PharmD, Martin Jauregui. He agrees with me giving the Ketamine myself and keeping pt on tele.

## 2022-03-23 NOTE — CONSULTS
GASTROENTEROLOGY INPATIENT CONSULTATION        IDENTIFYING DATA/REASON FOR CONSULTATION   PATIENT:  Sunny Marie  MRN:  [id number]  ADMIT DATE: 3/22/2022  TIME OF EVALUATION: 3/23/2022 11:27 AM  HOSPITAL STAY:   LOS: 1 day     REASON FOR CONSULTATION:  Intractable nausea, vomiting, abdominal pain. HISTORY OF PRESENT ILLNESS   Sunny Marie is a 49 y.o. male with a PMH of Obesity s/p Gastric Bypass (2009), Renal Cancer s/p nephrectomy (2018), splenic abscess s/p splenectomy, recurrent SBO, GERD, OSCAR, bradycardia s/p loop recorder implant and marijuana use who presented on 3/22/2022 with intractable n/v, epigastric pain. Symptoms started abruptly around 4 AM yesterday morning waking him up from sleep. He describes pain across his mid upper abdomen. He has been unable to keep any liquids down. He is having bowel movements and passing flatus. Last BM was yesterday morning. He denies seeing blood in his vomit or blood in his stools. Patient reports he has recurrent similar symptoms occurring 3-4 times per year. In the past symptoms have been attributed to his adhesions from prior abdominal surgeries. He smokes marijuana frequently. He last met marijuana on Sunday. He denies alcohol use. He denies NSAID. He denies tobacco use. CT A/P showed cholelithiasis but no evidence of acute cholecystitis, diverticulosis but no evidence of acute diverticulitis, free fluid in the pelvic, unchanged kidney stone, and chronic L1 severe compression injury. There was no acute abnormalites. Blood work showed normal LFTs, normal lipase        Prior Endoscopic Evaluations:  EGD 4/2018 with Dr. Metzger Generous  1. The esophagus showed two 1 cm tongues columnar epithelium without nodularity. 4-quadrant biopsies were taken. No reflux esophagitis. 2.  Gastric pouch was normal.  3.  Gastrojejunal anastamosis showed a 15mm ulcer with a pigmented spot and a suture on the jejunal side of the anastamosis.     4.  Darrian limb was normal.     EGD 6/2016 with Dr. Saul Come  1) Normal Darrian en Y gastric bypass anatomy without ulcerations or strictures noted. 2) Small sliding hiatal hernia. 3) Normal efferent jejunal limb for 40 cm past the anastamosis    PAST MEDICAL, SURGICAL, FAMILY, and SOCIAL HISTORY     Past Medical History:   Diagnosis Date    Alcoholism ([address]     last drink 2015    Allergic migraine with status migrainosus     Allergic rhinitis, seasonal     Anemia     Arthritis     right knee    Vaughn's esophagus     Benign intracranial hypertension     Cancer (HCC)     renal     Carpal tunnel syndrome     Chronic pain     Depression     GERD (gastroesophageal reflux disease)     Headache(784.0)     History of blood transfusion     History of tobacco use     Quit 7/2014    Migraine     chronic for 1 year    Morbid obesity ([address]     Movement disorder     Onychomycosis     Sleep apnea, obstructive     Severe uses cpap stop bang 6    Unspecified cerebral artery occlusion with cerebral infarction 2014    slight weakness in left arm    Use of cane as ambulatory aid     Wears dentures     full set    Wears glasses      Past Surgical History:   Procedure Laterality Date    ABDOMEN SURGERY      gastric bypass    ABDOMEN SURGERY  4-7-2016    repair of recurrent incisional hernia with mesh, removal of old mesh, bilateral component separation    ABDOMINAL EXPLORATION SURGERY  1/11/16    exp lap, lysis of adhesions, small bowel resection    CARPAL TUNNEL RELEASE      bilat    DILATATION, ESOPHAGUS      ENDOSCOPY, COLON, DIAGNOSTIC      EYE SURGERY      GASTRIC BYPASS SURGERY  Jan 2009    Has lost about 200 pounds.     HERNIA REPAIR  3-    ventral    JOINT REPLACEMENT      KIDNEY REMOVAL Left 08/01/2018    KNEE ARTHROSCOPY Right 7/11/2013    Dr.Robert Sanders     KNEE ARTHROSCOPY Right 7/11/12    RIGHT KNEE ARTHROSCOPY WITH CHONDROPLASTY    KNEE SURGERY  July 2012    right, arthroscopy    KNEE SURGERY Right 2/18/2020    INCISION AND DRAINAGE RIGHT KNEE AND WOULD VAC PLACEMENT performed by Marino Bailey MD at [address] Right 2/26/2021    INCISION AND DRAINAGE OF RIGHT KNEE HEMATOMA performed by Pernell Hoyt MD at . [address] Right 3/5/2021    INCISION AND DRAINAGE AND CLOSURE RIGHT TOTAL KNEE performed by Pernell Hoyt MD at [address]  2005    OTHER SURGICAL HISTORY Left 03/08/2016    CT biopsy ablasion left kidney    OTHER SURGICAL HISTORY  2016    small intestine 12 inch removed, 2 hernia surgeries, another surgery to drain infection from incision.  REVISION TOTAL KNEE ARTHROPLASTY Right 12/17/2019    RIGHT REVISION TIBIA TOTAL KNEE REPLACEMENT performed by Marino Bailey MD at [address] [place] [address] Right 1/22/2020    RIGHT KNEE IRRIGATION AND DEBRIDEMENT WITH POLY EXCHANGE performed by Manuel Allen MD at [address] [place] [address] Right 2/16/2021    RIGHT REMOVAL OF EXPLANT AND TOTAL KNEE REPLACEMENT [facility]    RIGHT    TOTAL KNEE ARTHROPLASTY Right 8/12/2020    RIGHT ARTHROPLASY  RESECTION WITH INSERTION OF SPACER performed by Marino Bailey MD at [address] [facility]  6-7-2016    UPPER GASTROINTESTINAL ENDOSCOPY  04/02/2018     Family History   Problem Relation Age of Onset    Cancer Father         Lymphoma    Arthritis Mother     Heart Disease Maternal Uncle     Heart Disease Maternal Uncle     Diabetes Maternal Uncle      Social History     Socioeconomic History    Marital status:      Spouse name: Raquel Richards Number of children: 2    Years of education: None    Highest education level: None   Occupational History    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    Tobacco Use    Smoking status: Former Smoker     Packs/day: 0.50     Years: 25.00 Pack years: 12.50     Types: Cigarettes     Start date: 1985     Quit date: 2021     Years since quittin.8    Smokeless tobacco: Never Used    Tobacco comment: Patient vapes   Vaping Use    Vaping Use: Never used   Substance and Sexual Activity    Alcohol use: No     Alcohol/week: 0.0 standard drinks     Comment: last drink     Drug use: Yes     Types: Marijuana Westly Bob)     Comment: medical marjiuana card    Sexual activity: Yes     Partners: Female     Comment: wife Carlton Elder   Other Topics Concern    None   Social History Narrative    None     Social Determinants of Health     Financial Resource Strain:     Difficulty of Paying Living Expenses: Not on file   Food Insecurity:     Worried About Running Out of Food in the Last Year: Not on file    Steven of Food in the Last Year: Not on file   Transportation Needs:     Lack of Transportation (Medical): Not on file    Lack of Transportation (Non-Medical):  Not on file   Physical Activity:     Days of Exercise per Week: Not on file    Minutes of Exercise per Session: Not on file   Stress:     Feeling of Stress : Not on file   Social Connections:     Frequency of Communication with Friends and Family: Not on file    Frequency of Social Gatherings with Friends and Family: Not on file    Attends [employer] Services: Not on file    Active Member of [address] [place] [address] or Organizations: Not on file    Attends Club or Organization Meetings: Not on file    Marital Status: Not on file   Intimate Partner Violence:     Fear of Current or Ex-Partner: Not on file    Emotionally Abused: Not on file    Physically Abused: Not on file    Sexually Abused: Not on file   Housing Stability:     Unable to Pay for Housing in the Last Year: Not on file    Number of [place] in the Last Year: Not on file    Unstable Housing in the Last Year: Not on file       MEDICATIONS   SCHEDULED:  sodium chloride flush, 10 mL, 2 times per day  enoxaparin, 40 mg, Daily      FLUIDS/DRIPS:     lactated ringers Stopped (03/22/22 2926)    sodium chloride       PRNs: sodium chloride flush, 10 mL, PRN  sodium chloride, 25 mL, PRN  potassium chloride, 40 mEq, PRN   Or  potassium alternative oral replacement, 40 mEq, PRN   Or  potassium chloride, 10 mEq, PRN  magnesium sulfate, 2,000 mg, PRN  magnesium hydroxide, 30 mL, Daily PRN  acetaminophen, 650 mg, Q6H PRN   Or  acetaminophen, 650 mg, Q6H PRN  promethazine, 25 mg, Q6H PRN      ALLERGIES:  He   Allergies   Allergen Reactions    Nsaids Nausea Only and Other (See Comments)     Hx of Barretts esophagus      Morphine Hives and Itching     Pt gets Hives/itching. Does not tolerate     Prochlorperazine Other (See Comments)     No allergic reaction, patient reported sense of \"restlessness\" and fidgiting    Valtrex [Valacyclovir Hcl] Diarrhea    Fentanyl Itching    Morphine And Related Hives and Itching    Tolmetin Nausea Only     Hx of Barretts esophagus       REVIEW OF SYSTEMS   Pertinent ROS noted in HPI    PHYSICAL EXAM     Vitals:    03/22/22 1932 03/22/22 2127 03/23/22 0646 03/23/22 0729   BP: 109/69 112/75  123/69   Pulse: (!) 39 (!) 40  (!) 46   Resp: 11 16  16   Temp: 98.3 °F (36.8 °C) 97.7 °F (36.5 °C)  97.9 °F (36.6 °C)   TempSrc:  Oral  Oral   SpO2: 94% 93%  92%   Weight:   210 lb 5.1 oz (95.4 kg)    Height:           I/O last 3 completed shifts: In: 119.7 [I.V.:69.7; IV Piggyback:50.1]  Out: -       Physical Exam:  General appearance: alert, cooperative, no distress, appears stated age  Eyes: Anicteric  Head: Normocephalic, without obvious abnormality  Lungs: clear to auscultation bilaterally, Normal Effort  Heart: regular rate and rhythm, normal S1 and S2, no murmurs or rubs  Abdomen: soft, non-distended, non-tender. Bowel sounds normal. No masses,  no organomegaly.    Extremities: atraumatic, no cyanosis or edema  Skin: warm and dry, no jaundice  Neuro: Grossly intact, A&OX3      LABS AND IMAGING   Laboratory Recent Labs     03/22/22  1418 03/23/22  0532   WBC 5.3 6.4   HGB 13.1* 12.0*   HCT 39.0* 36.9*   MCV 89.5 89.9    287     Recent Labs     03/22/22  1418 03/23/22  0532    142   K 4.2 4.2    109   CO2 24 21   BUN 6* 6*   CREATININE 1.0 0.9     Recent Labs     03/22/22  1418   AST 14*   ALT 10   BILIDIR <0.2   BILITOT 0.5   ALKPHOS 110     Recent Labs     03/22/22  1418   LIPASE 17.0     No results for input(s): PROTIME, INR in the last 72 hours. Imaging  CT ABDOMEN PELVIS W IV CONTRAST Additional Contrast? None   Final Result   1. Status post splenectomy and left nephrectomy. 2. Cholelithiasis but no acute cholecystitis. 3. Status post gastric surgery with no obvious complications. 4. Diverticulosis but no acute diverticulitis. 5. Some free fluid in the pelvis and a calculus which measures 2.2 cm also   previously seen and appears unchanged. 6. Chronic L1 severe compression injury. ASSESSMENT AND RECOMMENDATIONS   49 y.o. male with a PMH of Obesity s/p Gastric Bypass (2009), Renal Cancer s/p nephrectomy (2018), splenic abscess s/p splenectomy, recurrent SBO, GERD, OSCAR, bradycardia s/p loop recorder implant and marijuana use who presented on 3/22/2022 with intractable n/v, epigastric pain. IMPRESSION:  1. Acute recurrent nausea, vomiting, upper abdominal pain. Differentials include cyclic vomiting syndrome related to his marijuana use, acute viral gastroenteritis, PUD/GERD. CT showed no acute findings, no evidence of bowel obstruction. Lipase and LFTs normal.    2. Asymptomatic bradycardia. Seen by EP. Has implanted loop recorder. RECOMMENDATIONS:    We will discuss case with Dr. Stroud Files. Continue supportive care with antiemetics as needed and IV fluids. Please await input from Dr. Stroud Files.     If you have any questions or need any further information, please feel free to contact our consult team.  Thank you for allowing us to participate in the care of Keegan Rankin. The note was completed using Dragon voice recognition transcription. Every effort was made to ensure accuracy; however, inadvertent transcription errors may be present despite my best efforts to edit errors. Wendie Urias PA-C    Attending physician addendum:    I have personally seen and examined the patient, reviewed the patient's medical record and pertinent labs and clinical imaging. I have personally staffed the case with Wendie JOAQUIN. I agree with her consultation note, exam findings, assessment and plans  as written above. I have made appropriate modifications and edited her assessment and plan where needed to reflect my impression and plans for this patient. Keegan Rankin is a 49 y.o. male with a PMH of Obesity s/p Gastric Bypass (2009), Renal Cancer s/p nephrectomy (2018), splenic abscess s/p splenectomy, recurrent SBO, GERD, OSCAR, bradycardia s/p loop recorder implant and marijuana use who presented on 3/22/2022 with intractable nausea, vomiting, epigastric pain. Patient has been having worsening symptoms and recently saw Dr. Pedrito Mckenzie in office. CT showed evidence of cholelithiasis but no evidence of cholecystitis. LFT and Lipase normal. Hx of Marginal ulcer with bleed in 2018. He does use Marijuana and Cannabinoid induced hyperemesis. Continue IV fluids and antiemetics. Plan for EGD tomorrow. Thank you for allowing me to participate in this patient's care. If there are any questions or concerns regarding this patient, or the plan we have set in place, please feel free to contact me at [phone].      Levi Cooper MD

## 2022-03-23 NOTE — PLAN OF CARE
Problem: Falls - Risk of:  Goal: Will remain free from falls  Description: Will remain free from falls  Outcome: Ongoing  Note: Fall risk assessment completed. Fall precautions in place. Bed in lowest position, wheels locked, bed/chair exit alarm in place, call light within reach, and non skid footwear on. Walkway free of clutter. Pt alert and oriented and able to make needs known. Pt educated to use call light when needing to get up, and pt utilizes call light to make needs known. Will continue to monitor. Goal: Absence of physical injury  Description: Absence of physical injury  Outcome: Ongoing     Problem: Pain:  Goal: Pain level will decrease  Description: Pain level will decrease  Outcome: Ongoing  Note: Assessing and monitoring pt's pain. PRN pain medication being given if ordered. Educating pt on nonpharmacologic interventions for pain control. Will continue to monitor and reassess.    Goal: Control of acute pain  Description: Control of acute pain  Outcome: Ongoing  Goal: Control of chronic pain  Description: Control of chronic pain  Outcome: Ongoing

## 2022-03-23 NOTE — PROGRESS NOTES
Pt is alert and oriented x4, resting quietly in bed. Pt appeariing much more comfortable after ativan, ketamine, and phenergan administration. Pt remains NPO - no complaints of nausea or pain at this time. Pt remaining sinus iris on monitor, sitting around 35-50bpm. Call light within reach. No other needs made known at this time. Fall precautions in place. Will continue to monitor.     Electronically signed by Tyrel Scales RN on 3/23/2022 at 3:52 AM

## 2022-03-23 NOTE — PROGRESS NOTES
Physical Therapy  Evaluation attempt/ discharge  Keegan Rankin  [id number]     PT orders received and chart reviewed. Attempted to see pt this AM for therapy evaluation, pt denying need at this time, stating that he is at his baseline. RN reports pt is up-ad-agustina. Will sign off.    Electronically signed by Cielo Pratt PT on 3/23/2022 at 11:02 AM

## 2022-03-23 NOTE — PROGRESS NOTES
50mg of ketamine given IM by Clara STANFORD. 9mL wasted with Jenifer THORNTON in charcoal deactivator. Will continue to monitor.     Electronically signed by Jania Smith RN on 3/22/2022 at 11:41 PM      Electronically signed by Ml Lemus RN on 3/22/2022 at 11:47 PM

## 2022-03-23 NOTE — PROGRESS NOTES
4 Eyes Skin Assessment     NAME:  Rupali Monroy OF BIRTH:  1970  MEDICAL RECORD NUMBER:  [id number]    The patient is being assess for  Admission    I agree that 2 RN's have performed a thorough Head to Toe Skin Assessment on the patient. ALL assessment sites listed below have been assessed. Areas assessed by both nurses:    Head, Face, Ears, Shoulders, Back, Chest, Arms, Elbows, Hands, Sacrum. Buttock, Coccyx, Ischium and Legs. Feet and Heels        Does the Patient have a Wound?  No noted wound(s)       Andrei Prevention initiated:  No   Wound Care Orders initiated:  No    Pressure Injury (Stage 3,4, Unstageable, DTI, NWPT, and Complex wounds) if present place consult order under [de-identified] No    New and Established Ostomies if present place consult order under : NA      Nurse 1 eSignature: Electronically signed by Сергей Burger RN on 3/23/22 at 5:10 AM EDT    **SHARE this note so that the co-signing nurse is able to place an eSignature**    Nurse 2 eSignature: Electronically signed by Kulwinder Landon RN on 3/23/22 at 5:16 AM EDT

## 2022-03-23 NOTE — PLAN OF CARE
Problem: Falls - Risk of:  Goal: Will remain free from falls  Description: Will remain free from falls  3/23/2022 0733 by Kleber Smiley RN  Outcome: Ongoing  Note: Fall risk assessment completed. Fall precautions in place. Bed in lowest position, wheels locked, bed/chair exit alarm in place, call light within reach, and non skid footwear on. Walkway free of clutter. Pt alert and oriented and able to make needs known. Pt educated to use call light when needing to get up, and pt utilizes call light to make needs known. Will continue to monitor. Problem: Falls - Risk of:  Goal: Absence of physical injury  Description: Absence of physical injury  3/23/2022 0733 by Kleber Smiley RN  Outcome: Ongoing  Note: Pt is free of injury. No injury noted. Fall precautions in place. Call light within reach. Will monitor. Problem: Pain:  Goal: Pain level will decrease  Description: Pain level will decrease  3/23/2022 0733 by Kleber Smiley RN  Outcome: Ongoing  Note: Pt assessed for pain. Pt in pain and assessed with 0-10 pain rating scale. Pt given prescribed analgesic for pain. (See eMar) Pt satisfied with pain relief thus far. Will reassess and continue to monitor. Problem: Pain:  Goal: Control of acute pain  Description: Control of acute pain  3/23/2022 0733 by Kleber Smiley RN  Outcome: Ongoing  Note: Pt assessed for pain. Pt in pain and assessed with 0-10 pain rating scale. Pt given prescribed analgesic for pain. (See eMar) Pt satisfied with pain relief thus far. Will reassess and continue to monitor. Problem: Pain:  Goal: Control of chronic pain  Description: Control of chronic pain  3/23/2022 0733 by Kleber Smiley RN  Outcome: Ongoing  Note: Pt assessed for pain. Pt in pain and assessed with 0-10 pain rating scale. Pt given prescribed analgesic for pain. (See eMar) Pt satisfied with pain relief thus far. Will reassess and continue to monitor.

## 2022-03-23 NOTE — PROGRESS NOTES
Pt wanting to leave AMA - pt education on implications of leaving AMA. Pt's pain and nausea not well controlled from medications given in ER. Gin Neal NP notified - pt met with Gin Neal and plan to administer ketamine, phenergan and ativan was agreed upon. Will continue to monitor.     Electronically signed by Vipul Tran RN on 3/23/2022 at 3:50 AM

## 2022-03-23 NOTE — PROGRESS NOTES
Patient is alert & oriented x4, UAL, 2/4 bed rails up, bed in lowest position, fall precautions in place, call light within reach. IV fluid infusing in new IV placed by this RN in right hand. Will cont to monitor and reassess. Pt denies needs at this time.  Electronically signed by Kellen Lopez RN on 3/23/2022 at 10:58 AM

## 2022-03-23 NOTE — CONSULTS
Cardiac Electrophysiology Consultation     Date: 3/23/2022  Admit Date:  3/22/2022  Admission Diagnosis: Bradycardia [R00.1]  Generalized abdominal pain [R10.84]  Intractable nausea and vomiting [R11.2]     Reason for Consultation: bradycardia  Consult Requesting Physician: Sam Sloan MD       History of Present Illness  Jaden Acosta is a 49y.o. year old male with past medical history significant for known stroke, depression, renal cell carcinoma (s/p nephrectomy 2018), splenic abscess (s/p splenectomy), OSCAR, morbid obesity (s/p gastric bypass 2009), multiple SBOs, intestinal adhesions, and GERD. He presented to the ER 3/22 with complaints of 10/10 epigastric abdominal pain and intractable nausea and vomiting. He has had multiple SBOs in the past r/t adhesions from a hx of gastric bypass surgery, and he states his pain and symptoms are similar to past episodes. CT abdomen/pelvis is unrevealing of acute process. The patient's ECG while in the ER displayed bradycardia with bigeminal PACs. The patient has a known history of bradycardia and syncope and subsequently had an ILR placed in October of 2021 by Dr. Tete Arango for clinical correlation. There have been no reported episodes of bradycardia or syncope since implantation. The patient also denies any current or recent symptoms of chest pain, palpitations, fatigue, or dyspnea on exertion. He states the only symptoms he is experiencing currently are abdominal pain and a small amount of diarrhea.         Past Medical History:   Diagnosis Date    Alcoholism [facility])     last drink 2015    Allergic migraine with status migrainosus     Allergic rhinitis, seasonal     Anemia     Arthritis     right knee    Vaughn's esophagus     Benign intracranial hypertension     Cancer (HCC)     renal     Carpal tunnel syndrome     Chronic pain     Depression     GERD (gastroesophageal reflux disease)     Headache(784.0)     History of blood transfusion     History of tobacco use     Quit 7/2014    Migraine     chronic for 1 year    Morbid obesity ([address]     Movement disorder     Onychomycosis     Sleep apnea, obstructive     Severe uses cpap stop bang 6    Unspecified cerebral artery occlusion with cerebral infarction 2014    slight weakness in left arm    Use of cane as ambulatory aid     Wears dentures     full set    Wears glasses         Past Surgical History:   Procedure Laterality Date    ABDOMEN SURGERY      gastric bypass    ABDOMEN SURGERY  4-7-2016    repair of recurrent incisional hernia with mesh, removal of old mesh, bilateral component separation    ABDOMINAL EXPLORATION SURGERY  1/11/16    exp lap, lysis of adhesions, small bowel resection    CARPAL TUNNEL RELEASE      bilat    DILATATION, ESOPHAGUS      ENDOSCOPY, COLON, DIAGNOSTIC      EYE SURGERY      GASTRIC BYPASS SURGERY  Jan 2009    Has lost about 200 pounds.  HERNIA REPAIR  3-    ventral    JOINT REPLACEMENT      KIDNEY REMOVAL Left 08/01/2018    [facility]     KNEE ARTHROSCOPY Right 7/11/12    RIGHT KNEE ARTHROSCOPY WITH CHONDROPLASTY    KNEE SURGERY  July 2012    right, arthroscopy    KNEE SURGERY Right 2/18/2020    INCISION AND DRAINAGE RIGHT KNEE AND WOULD VAC PLACEMENT performed by Martin Jaramillo MD at [address] Right 2/26/2021    INCISION AND DRAINAGE OF RIGHT KNEE HEMATOMA performed by Kip Thakkar MD at [address] Right 3/5/2021    INCISION AND DRAINAGE AND CLOSURE RIGHT TOTAL KNEE performed by Kip Thakkar MD at [address]  2005    OTHER SURGICAL HISTORY Left 03/08/2016    CT biopsy ablasion left kidney    OTHER SURGICAL HISTORY  2016    small intestine 12 inch removed, 2 hernia surgeries, another surgery to drain infection from incision.      REVISION TOTAL KNEE ARTHROPLASTY Right 12/17/2019    RIGHT REVISION TIBIA TOTAL KNEE REPLACEMENT performed by Angely Arauz Meek Tolbert MD at [address] Right 1/22/2020    RIGHT KNEE IRRIGATION AND DEBRIDEMENT WITH POLY EXCHANGE performed by Terrence Watson MD at [address] Right 2/16/2021    RIGHT REMOVAL OF EXPLANT AND TOTAL KNEE REPLACEMENT performed by Sangeeta Godinez MD at [address] [facility] [address]  10/15/13    RIGHT    TOTAL KNEE ARTHROPLASTY Right 8/12/2020    RIGHT ARTHROPLASY  RESECTION WITH INSERTION OF SPACER performed by Jaya Schultz MD at [address]  6-7-2016    UPPER GASTROINTESTINAL ENDOSCOPY  04/02/2018       Current Outpatient Medications   Medication Instructions    atorvastatin (LIPITOR) 40 MG tablet TAKE 1 TABLET BY MOUTH EVERYDAY AT BEDTIME    calcium-vitamin D (OSCAL 500/200 D-3) 500-200 MG-UNIT per tablet 1 tablet, Oral, 2 TIMES DAILY    clopidogrel (PLAVIX) 75 mg, Oral, DAILY    dicyclomine (BENTYL) 10 mg, Oral, EVERY 6 HOURS PRN    FLUoxetine (PROZAC) 20 mg, Oral, DAILY    gabapentin (NEURONTIN) 600 mg, Oral, 4 TIMES DAILY    Multiple Vitamin TABS 1 tablet, Oral, DAILY    ondansetron (ZOFRAN ODT) 4 mg, Oral, EVERY 8 HOURS PRN    pantoprazole (PROTONIX) 40 mg, Oral, 2 TIMES DAILY    sildenafil (REVATIO) 80 mg, Oral, PRN    traZODone (DESYREL) 200 mg, Oral, NIGHTLY        Allergies   Allergen Reactions    Nsaids Nausea Only and Other (See Comments)     Hx of Barretts esophagus      Morphine Hives and Itching     Pt gets Hives/itching. Does not tolerate     Prochlorperazine Other (See Comments)     No allergic reaction, patient reported sense of \"restlessness\" and fidgiting    Valtrex [Valacyclovir Hcl] Diarrhea    Fentanyl Itching    Morphine And Related Hives and Itching    Tolmetin Nausea Only     Hx of Barretts esophagus       Social History:   reports that he quit smoking about 10 months ago. His smoking use included cigarettes.  He started smoking about 37 years ago. He has a 12.50 pack-year smoking history. He has never used smokeless tobacco. He reports current drug use. Drug: Marijuana Cookie Crowe). He reports that he does not drink alcohol. Family History:  family history includes Arthritis in his mother; Cancer in his father; Diabetes in his maternal uncle; Heart Disease in his maternal uncle and maternal uncle. Review of Systems:  · General: negative for fever, chills   · Ophthalmic ROS: negative for eye pain or loss of vision  · ENT ROS: negative for headaches, sore throat, nasal drainage  · Respiratory: negative for cough, sputum, SOB  · Cardiovascular: negative for chest pain, palpitations.   · Gastrointestinal: + abdominal pain, diarrhea, N/V  · Hematology: negative for bleeding, blood clots, bruising or jaundice  · Genito-Urinary:  negative for dysuria or incontinence  · Musculoskeletal: negative for joint swelling, muscle pain  · Neurological: negative for confusion, dizziness, headaches   · Psychiatric: negative anxiety, depression  · Dermatological: negative for rash    Medications:  Scheduled Meds:   sodium chloride flush  10 mL IntraVENous 2 times per day    enoxaparin  40 mg SubCUTAneous Daily      Continuous Infusions:   lactated ringers Stopped (03/22/22 9992)    sodium chloride       PRN Meds:.sodium chloride flush, sodium chloride, potassium chloride **OR** potassium alternative oral replacement **OR** potassium chloride, magnesium sulfate, magnesium hydroxide, acetaminophen **OR** acetaminophen, promethazine     Physical Examination:  Vitals:    03/23/22 0729   BP: 123/69   Pulse: (!) 46   Resp: 16   Temp: 97.9 °F (36.6 °C)   SpO2: 92%        Intake/Output Summary (Last 24 hours) at 3/23/2022 0820  Last data filed at 3/23/2022 0043  Gross per 24 hour   Intake 119.71 ml   Output --   Net 119.71 ml     In: 119.7 [I.V.:69.7]  Out: -    Wt Readings from Last 3 Encounters:   03/23/22 210 lb 5.1 oz (95.4 kg)   03/07/22 213 lb 10 oz (96.9 kg) 22 208 lb (94.3 kg)     Temp  Av.9 °F (36.6 °C)  Min: 97.7 °F (36.5 °C)  Max: 98.3 °F (36.8 °C)  Pulse  Av.8  Min: 39  Max: 82  BP  Min: 109/69  Max: 133/82  SpO2  Av.3 %  Min: 92 %  Max: 98 %    · Telemetry: Sinus bradycardia, rate 47  · Constitutional: Alert, in no acute distress. Appears stated age. · Head: Normocephalic and atraumatic. · Eyes: Conjunctivae normal. EOM are normal.   · Neck: Neck supple. No lymphadenopathy. No rigidity. No JVD present. · Cardiovascular: Normal rate, regular rhythm. No murmurs, rubs or gallops. No S3 or S4.  · Pulmonary/Chest: Clear breath sounds bilaterally. No crackles, wheezes or rhonchi. No respiratory accessory muscle use. · Abdominal: Soft. Normal bowel sounds present. No distension, No tenderness. · Musculoskeletal: No tenderness. No edema    · Lymphadenopathy: Has no cervical adenopathy. · Neurological: Alert and oriented. No gross deficits. · Skin: Skin is warm and dry. No rash, lesions, ulcerations noted. · Psychiatric: No anxiety nor agitation. Labs:  Reviewed. Recent Labs     22  1418 22  0532    142   K 4.2 4.2    109   CO2 24 21   BUN 6* 6*   CREATININE 1.0 0.9     Recent Labs     22  1418 22  0532   WBC 5.3 6.4   HGB 13.1* 12.0*   HCT 39.0* 36.9*   MCV 89.5 89.9    287     Lab Results   Component Value Date    CKTOTAL 56 2020    CKMB 0.9 2014    TROPONINI <0.01 2021     Lab Results   Component Value Date     10/13/2020     Lab Results   Component Value Date    PROTIME 13.2 2021    PROTIME 13.4 2021    PROTIME 13.0 2020    INR 1.16 2021    INR 1.15 2021    INR 1.12 2020     Lab Results   Component Value Date    CHOL 106 2021    HDL 52 2021    TRIG 103 2021       Diagnostic and imaging results reviewed. ECG:   SR at 41 BPM. Atrial bigeminy.     Echo Limited: 2021  Normal left ventricle size, wall thickness, and systolic function with an  estimated ejection fraction of 55%. No regional wall motion abnormalities are seen. A bubble study was performed and fails to show evidence of right to left shunting. A 25 mm Amplatzer PFO occluder device was seen and appears well seated  between the right and left atria. No evidence of any pericardial effusion. EP Procedure: 10/12/2021  ILR placed by Dr. Jodi Rees  Indication: syncope      Assessment & Plan:    1. Sinus Bradycardia   - First documented ECG displaying bradycardia performed 3/8/2020   - The patient presented for ILR placement related to syncope in October of 2021--there have been no patient reported symptomatic episodes since implant    - device interrogated today, does show a decreased in average HR over the last month or so    - The patient denies any current symptoms and has not experienced a syncopal episodes   - TSH normal, toxicology negative   - Echo from 8/2021 stable, EF 55% (25 mm Amplatzer PFO occluder device present)   - Of note, pt had recent ketamine infusion with Dr. Payam Aceves, pain management on 03/15/2022 (ketamine can cause severe bradycardia)   - RN instructed to ambulate patient to rule out chronotropic incompetence, if this is unrevealing no further workup is indicated at this time and EP will sign off and continue to monitor remotely via ILR     2. Intractable Nausea/Vomiting w/ Epigastric Pain   - GI consulted   - Hx of gastric bypass surgery and multiple SBOs   - CT abd/pelvis negative for acute process   - Recent ketamine infusion with Dr. Payam Aceves, pain management on 03/15/2022   - Pain meds/antiemetics prn    - Plans for upper endoscopy, colonoscopy, endoscopic ultrasound and/or ERCP with Dr. Inderjit English soon    Discussed with Dr. Jonathon Bird.     LILLIE Avila  [facility], [address] [place] [address], [address] [place] [address]  Phone: [phone]  Fax: [phone]    Electronically signed by Marco Clemons on 3/23/2022 at 8:20 AM

## 2022-03-23 NOTE — H&P
Jan 2009    Has lost about 200 pounds.  HERNIA REPAIR  3-    ventral    JOINT REPLACEMENT      KIDNEY REMOVAL Left 08/01/2018    KNEE ARTHROSCOPY Right 7/11/2013    Dr.Robert WaltersAdelina     KNEE ARTHROSCOPY Right 7/11/12    RIGHT KNEE ARTHROSCOPY WITH CHONDROPLASTY    KNEE SURGERY  July 2012    right, arthroscopy    KNEE SURGERY Right 2/18/2020    INCISION AND DRAINAGE RIGHT KNEE AND WOULD VAC PLACEMENT performed by Ashok Meléndez MD at [address] [facility] Right 2/26/2021    INCISION AND DRAINAGE OF RIGHT KNEE HEMATOMA performed by Jarret Knox MD at [address] [facility] Right 3/5/2021    INCISION AND DRAINAGE AND CLOSURE RIGHT TOTAL KNEE performed by Jarret Knox MD at [address] [facility]  2005    OTHER SURGICAL HISTORY Left 03/08/2016    CT biopsy ablasion left kidney    OTHER SURGICAL HISTORY  2016    small intestine 12 inch removed, 2 hernia surgeries, another surgery to drain infection from incision.  REVISION TOTAL KNEE ARTHROPLASTY Right 12/17/2019    RIGHT REVISION TIBIA TOTAL KNEE REPLACEMENT performed by Ashok Meléndez MD at [address] [facility] Right 1/22/2020    RIGHT KNEE IRRIGATION AND DEBRIDEMENT WITH POLY EXCHANGE performed by Rebecca Marrero MD at [address] [facility] Right 2/16/2021    RIGHT REMOVAL OF EXPLANT AND TOTAL KNEE REPLACEMENT [facility] [address] [facility]  10/15/[facility]    RIGHT    TOTAL KNEE ARTHROPLASTY Right 8/12/2020    RIGHT ARTHROPLASY  RESECTION WITH INSERTION OF SPACER performed by Ashok Meléndez MD at [place] [address]  6-7-2016    UPPER GASTROINTESTINAL ENDOSCOPY  04/02/2018       Medications Prior to Admission:      Prior to Admission medications    Medication Sig Start Date End Date Taking?  Authorizing Provider   dicyclomine (BENTYL) 10 MG capsule Take 1 capsule by mouth every 6 hours as needed (cramps) 3/7/22   Radha Bernardo MD   ondansetron (ZOFRAN ODT) 4 MG disintegrating tablet Take 1 tablet by mouth every 8 hours as needed for Nausea 3/7/22   Radha Bernardo MD   atorvastatin (LIPITOR) 40 MG tablet TAKE 1 TABLET BY MOUTH EVERYDAY AT BEDTIME 1/10/22   Mackenzie Alan MD   FLUoxetine [facility]) 20 MG capsule Take 1 capsule by mouth daily 12/17/21   Alexa Valdivia MD   traZODone (DESYREL) 100 MG tablet Take 2 tablets by mouth nightly 12/17/21   Alexa Valdivia MD   pantoprazole (PROTONIX) 40 MG tablet Take 1 tablet by mouth 2 times daily 9/27/21   Mackenzie Alan MD   clopidogrel (PLAVIX) 75 MG tablet Take 1 tablet by mouth daily 9/16/21   Ketan Conde MD   gabapentin (NEURONTIN) 600 MG tablet Take 600 mg by mouth 4 times daily. 8/2/21   Historical Provider, MD   sildenafil (REVATIO) 20 MG tablet Take 4 tablets by mouth as needed (30 min prior to intercourse) 1/11/21   Mackenzie Alan MD   calcium-vitamin D (OSCAL 500/200 D-3) 500-200 MG-UNIT per tablet Take 1 tablet by mouth 2 times daily     Historical Provider, MD   Multiple Vitamin TABS Take 1 tablet by mouth daily     Historical Provider, MD       Allergies:  Nsaids, Morphine, Prochlorperazine, Valtrex [valacyclovir hcl], Fentanyl, Morphine and related, and Tolmetin    Social History:      TOBACCO:   reports that he quit smoking about 10 months ago. His smoking use included cigarettes. He started smoking about 37 years ago. He has a 12.50 pack-year smoking history. He has never used smokeless tobacco.  ETOH:   reports no history of alcohol use. Family History:       Reviewed in detail and non contributory          Problem Relation Age of Onset    Cancer Father         Lymphoma    Arthritis Mother     Heart Disease Maternal Uncle     Heart Disease Maternal Uncle     Diabetes Maternal Uncle        REVIEW OF SYSTEMS:   Pertinent positives as noted in the HPI.  All other systems reviewed and 5. Some free fluid in the pelvis and a calculus which measures 2.2 cm also   previously seen and appears unchanged. 6. Chronic L1 severe compression injury. Active Hospital Problems    Diagnosis Date Noted    Intractable nausea and vomiting [R11.2] 03/22/2022       Patient is a 73-year-old male with past medical history of marijuana abuse who presents to the hospital due to abdominal pain nausea vomiting, mentions for the past 2 days he has not been able to keep down any food or eat or drink. Patient mentions his abdominal pain is epigastric, 10/10 intermittent severity, he mentions he has a history of multiple abdominal surgeries and he has chronic abdominal pains. Assessment  Intractable nausea vomiting  Bradycardia  History of marijuana abuse    Plan  Patient has been given multiple doses of Zofran without much relief, suspect patient's nausea vomiting is opioid induced,-holding off opioids, capsaicin cream applied to the abdomen  IV fluids  Monitor on cardiac telemetry, check TSH, consult cardiology for bradycardia  DVT prophylaxis-Lovenox  Diet: No diet orders on file  Code Status: Prior    PT/OT Eval Status: ordered    Dispo - pending clinical improvement       Tammi Garza MD    The note was completed using EMR and Dragon dictation system. Every effort was made to ensure accuracy; however, inadvertent computerized transcription errors may be present. Thank you Ashkan Thorpe MD for the opportunity to be involved in this patient's care. If you have any questions or concerns please feel free to contact me at [phone].     Tammi Garza MD

## 2022-03-24 ENCOUNTER — ANESTHESIA EVENT (OUTPATIENT)
Dept: ENDOSCOPY | Age: 52
DRG: 382 | End: 2022-03-24
Payer: COMMERCIAL

## 2022-03-24 ENCOUNTER — ANESTHESIA (OUTPATIENT)
Dept: ENDOSCOPY | Age: 52
DRG: 382 | End: 2022-03-24
Payer: COMMERCIAL

## 2022-03-24 VITALS — OXYGEN SATURATION: 94 % | DIASTOLIC BLOOD PRESSURE: 65 MMHG | SYSTOLIC BLOOD PRESSURE: 112 MMHG

## 2022-03-24 LAB
ANION GAP SERPL CALCULATED.3IONS-SCNC: 10 MMOL/L (ref 3–16)
BUN BLDV-MCNC: 11 MG/DL (ref 7–20)
CALCIUM SERPL-MCNC: 8.5 MG/DL (ref 8.3–10.6)
CHLORIDE BLD-SCNC: 107 MMOL/L (ref 99–110)
CO2: 23 MMOL/L (ref 21–32)
CREAT SERPL-MCNC: 0.9 MG/DL (ref 0.9–1.3)
GFR AFRICAN AMERICAN: >60
GFR NON-AFRICAN AMERICAN: >60
GLUCOSE BLD-MCNC: 75 MG/DL (ref 70–99)
POTASSIUM REFLEX MAGNESIUM: 4.2 MMOL/L (ref 3.5–5.1)
SODIUM BLD-SCNC: 140 MMOL/L (ref 136–145)

## 2022-03-24 PROCEDURE — 6360000002 HC RX W HCPCS: Performed by: INTERNAL MEDICINE

## 2022-03-24 PROCEDURE — 3609017100 HC EGD: Performed by: INTERNAL MEDICINE

## 2022-03-24 PROCEDURE — 96372 THER/PROPH/DIAG INJ SC/IM: CPT

## 2022-03-24 PROCEDURE — 7100000001 HC PACU RECOVERY - ADDTL 15 MIN: Performed by: INTERNAL MEDICINE

## 2022-03-24 PROCEDURE — 2500000003 HC RX 250 WO HCPCS: Performed by: NURSE ANESTHETIST, CERTIFIED REGISTERED

## 2022-03-24 PROCEDURE — 2580000003 HC RX 258: Performed by: INTERNAL MEDICINE

## 2022-03-24 PROCEDURE — 1200000000 HC SEMI PRIVATE

## 2022-03-24 PROCEDURE — 0DJ08ZZ INSPECTION OF UPPER INTESTINAL TRACT, VIA NATURAL OR ARTIFICIAL OPENING ENDOSCOPIC: ICD-10-PCS | Performed by: INTERNAL MEDICINE

## 2022-03-24 PROCEDURE — 3700000000 HC ANESTHESIA ATTENDED CARE: Performed by: INTERNAL MEDICINE

## 2022-03-24 PROCEDURE — 6360000002 HC RX W HCPCS: Performed by: NURSE PRACTITIONER

## 2022-03-24 PROCEDURE — G0378 HOSPITAL OBSERVATION PER HR: HCPCS

## 2022-03-24 PROCEDURE — 6360000002 HC RX W HCPCS: Performed by: NURSE ANESTHETIST, CERTIFIED REGISTERED

## 2022-03-24 PROCEDURE — 7100000000 HC PACU RECOVERY - FIRST 15 MIN: Performed by: INTERNAL MEDICINE

## 2022-03-24 PROCEDURE — 96376 TX/PRO/DX INJ SAME DRUG ADON: CPT

## 2022-03-24 PROCEDURE — 2709999900 HC NON-CHARGEABLE SUPPLY: Performed by: INTERNAL MEDICINE

## 2022-03-24 PROCEDURE — 36415 COLL VENOUS BLD VENIPUNCTURE: CPT

## 2022-03-24 PROCEDURE — 6370000000 HC RX 637 (ALT 250 FOR IP): Performed by: NURSE PRACTITIONER

## 2022-03-24 PROCEDURE — 80048 BASIC METABOLIC PNL TOTAL CA: CPT

## 2022-03-24 RX ORDER — PROPOFOL 10 MG/ML
INJECTION, EMULSION INTRAVENOUS PRN
Status: DISCONTINUED | OUTPATIENT
Start: 2022-03-24 | End: 2022-03-24 | Stop reason: SDUPTHER

## 2022-03-24 RX ORDER — GLYCOPYRROLATE 0.2 MG/ML
INJECTION INTRAMUSCULAR; INTRAVENOUS PRN
Status: DISCONTINUED | OUTPATIENT
Start: 2022-03-24 | End: 2022-03-24 | Stop reason: SDUPTHER

## 2022-03-24 RX ORDER — OXYCODONE HYDROCHLORIDE AND ACETAMINOPHEN 5; 325 MG/1; MG/1
1 TABLET ORAL EVERY 6 HOURS PRN
Status: DISCONTINUED | OUTPATIENT
Start: 2022-03-24 | End: 2022-03-25 | Stop reason: HOSPADM

## 2022-03-24 RX ORDER — SUCRALFATE 1 G/1
1 TABLET ORAL EVERY 6 HOURS SCHEDULED
Status: DISCONTINUED | OUTPATIENT
Start: 2022-03-24 | End: 2022-03-25 | Stop reason: HOSPADM

## 2022-03-24 RX ORDER — LIDOCAINE HYDROCHLORIDE 20 MG/ML
INJECTION, SOLUTION EPIDURAL; INFILTRATION; INTRACAUDAL; PERINEURAL PRN
Status: DISCONTINUED | OUTPATIENT
Start: 2022-03-24 | End: 2022-03-24 | Stop reason: SDUPTHER

## 2022-03-24 RX ADMIN — Medication 25 MG: at 22:27

## 2022-03-24 RX ADMIN — HYDROMORPHONE HYDROCHLORIDE 0.5 MG: 1 INJECTION, SOLUTION INTRAMUSCULAR; INTRAVENOUS; SUBCUTANEOUS at 02:41

## 2022-03-24 RX ADMIN — GLYCOPYRROLATE 0.2 MG: 0.2 INJECTION, SOLUTION INTRAMUSCULAR; INTRAVENOUS at 12:37

## 2022-03-24 RX ADMIN — SUCRALFATE 1 G: 1 TABLET ORAL at 14:55

## 2022-03-24 RX ADMIN — Medication 25 MG: at 03:15

## 2022-03-24 RX ADMIN — SODIUM CHLORIDE, POTASSIUM CHLORIDE, SODIUM LACTATE AND CALCIUM CHLORIDE: 600; 310; 30; 20 INJECTION, SOLUTION INTRAVENOUS at 06:23

## 2022-03-24 RX ADMIN — OXYCODONE HYDROCHLORIDE AND ACETAMINOPHEN 1 TABLET: 5; 325 TABLET ORAL at 22:26

## 2022-03-24 RX ADMIN — PROPOFOL 150 MG: 10 INJECTION, EMULSION INTRAVENOUS at 12:36

## 2022-03-24 RX ADMIN — ENOXAPARIN SODIUM 40 MG: 40 INJECTION SUBCUTANEOUS at 08:20

## 2022-03-24 RX ADMIN — HYDROMORPHONE HYDROCHLORIDE 0.5 MG: 1 INJECTION, SOLUTION INTRAMUSCULAR; INTRAVENOUS; SUBCUTANEOUS at 14:55

## 2022-03-24 RX ADMIN — HYDROMORPHONE HYDROCHLORIDE 0.5 MG: 1 INJECTION, SOLUTION INTRAMUSCULAR; INTRAVENOUS; SUBCUTANEOUS at 19:42

## 2022-03-24 RX ADMIN — SODIUM CHLORIDE: 9 INJECTION, SOLUTION INTRAVENOUS at 12:31

## 2022-03-24 RX ADMIN — LIDOCAINE HYDROCHLORIDE 60 MG: 20 INJECTION, SOLUTION EPIDURAL; INFILTRATION; INTRACAUDAL; PERINEURAL at 12:36

## 2022-03-24 RX ADMIN — SODIUM CHLORIDE, POTASSIUM CHLORIDE, SODIUM LACTATE AND CALCIUM CHLORIDE: 600; 310; 30; 20 INJECTION, SOLUTION INTRAVENOUS at 19:42

## 2022-03-24 RX ADMIN — PROPOFOL 50 MG: 10 INJECTION, EMULSION INTRAVENOUS at 12:37

## 2022-03-24 RX ADMIN — SUCRALFATE 1 G: 1 TABLET ORAL at 19:31

## 2022-03-24 RX ADMIN — HYDROMORPHONE HYDROCHLORIDE 0.5 MG: 1 INJECTION, SOLUTION INTRAMUSCULAR; INTRAVENOUS; SUBCUTANEOUS at 10:22

## 2022-03-24 RX ADMIN — HYDROMORPHONE HYDROCHLORIDE 0.5 MG: 1 INJECTION, SOLUTION INTRAMUSCULAR; INTRAVENOUS; SUBCUTANEOUS at 06:23

## 2022-03-24 ASSESSMENT — PAIN SCALES - GENERAL
PAINLEVEL_OUTOF10: 7
PAINLEVEL_OUTOF10: 0
PAINLEVEL_OUTOF10: 6
PAINLEVEL_OUTOF10: 7
PAINLEVEL_OUTOF10: 7
PAINLEVEL_OUTOF10: 0
PAINLEVEL_OUTOF10: 8
PAINLEVEL_OUTOF10: 3
PAINLEVEL_OUTOF10: 0
PAINLEVEL_OUTOF10: 0
PAINLEVEL_OUTOF10: 8
PAINLEVEL_OUTOF10: 3
PAINLEVEL_OUTOF10: 0

## 2022-03-24 ASSESSMENT — PAIN DESCRIPTION - LOCATION
LOCATION: ABDOMEN

## 2022-03-24 ASSESSMENT — PAIN DESCRIPTION - ORIENTATION
ORIENTATION: MID
ORIENTATION: LOWER
ORIENTATION: MID
ORIENTATION: MID
ORIENTATION: LOWER
ORIENTATION: MID

## 2022-03-24 ASSESSMENT — PAIN DESCRIPTION - PROGRESSION
CLINICAL_PROGRESSION: GRADUALLY WORSENING
CLINICAL_PROGRESSION: GRADUALLY WORSENING
CLINICAL_PROGRESSION: NOT CHANGED
CLINICAL_PROGRESSION: GRADUALLY WORSENING
CLINICAL_PROGRESSION: NOT CHANGED

## 2022-03-24 ASSESSMENT — PAIN DESCRIPTION - ONSET
ONSET: ON-GOING
ONSET: ON-GOING
ONSET: SUDDEN
ONSET: ON-GOING
ONSET: SUDDEN

## 2022-03-24 ASSESSMENT — PULMONARY FUNCTION TESTS
PIF_VALUE: 1
PIF_VALUE: 0
PIF_VALUE: 1
PIF_VALUE: 0
PIF_VALUE: 1
PIF_VALUE: 0
PIF_VALUE: 1
PIF_VALUE: 1
PIF_VALUE: 0

## 2022-03-24 ASSESSMENT — PAIN DESCRIPTION - DESCRIPTORS
DESCRIPTORS: ACHING;THROBBING
DESCRIPTORS: ACHING
DESCRIPTORS: ACHING;THROBBING
DESCRIPTORS: ACHING;THROBBING
DESCRIPTORS: SHARP;ACHING
DESCRIPTORS: ACHING;SHARP
DESCRIPTORS: ACHING;THROBBING

## 2022-03-24 ASSESSMENT — PAIN DESCRIPTION - PAIN TYPE
TYPE: CHRONIC PAIN
TYPE: CHRONIC PAIN
TYPE: ACUTE PAIN
TYPE: CHRONIC PAIN
TYPE: ACUTE PAIN
TYPE: CHRONIC PAIN

## 2022-03-24 ASSESSMENT — PAIN - FUNCTIONAL ASSESSMENT
PAIN_FUNCTIONAL_ASSESSMENT: ACTIVITIES ARE NOT PREVENTED
PAIN_FUNCTIONAL_ASSESSMENT: PREVENTS OR INTERFERES SOME ACTIVE ACTIVITIES AND ADLS
PAIN_FUNCTIONAL_ASSESSMENT: ACTIVITIES ARE NOT PREVENTED
PAIN_FUNCTIONAL_ASSESSMENT: ACTIVITIES ARE NOT PREVENTED
PAIN_FUNCTIONAL_ASSESSMENT: 0-10
PAIN_FUNCTIONAL_ASSESSMENT: PREVENTS OR INTERFERES SOME ACTIVE ACTIVITIES AND ADLS
PAIN_FUNCTIONAL_ASSESSMENT: ACTIVITIES ARE NOT PREVENTED
PAIN_FUNCTIONAL_ASSESSMENT: PREVENTS OR INTERFERES SOME ACTIVE ACTIVITIES AND ADLS
PAIN_FUNCTIONAL_ASSESSMENT: PREVENTS OR INTERFERES SOME ACTIVE ACTIVITIES AND ADLS

## 2022-03-24 ASSESSMENT — PAIN DESCRIPTION - FREQUENCY
FREQUENCY: CONTINUOUS
FREQUENCY: INTERMITTENT
FREQUENCY: CONTINUOUS
FREQUENCY: INTERMITTENT

## 2022-03-24 NOTE — PLAN OF CARE
Problem: Falls - Risk of:  Goal: Will remain free from falls  Description: Will remain free from falls  3/23/2022 2035 by Brian Suárez RN  Outcome: Ongoing  Note: Fall risk assessment completed. Fall precautions in place. Call light within reach. Pt educated on calling for assistance before getting up. Walkway free of clutter. Will continue to monitor. 3/23/2022 6174 by Isis Cerrato RN  Outcome: Ongoing  Note: Fall risk assessment completed. Fall precautions in place. Bed in lowest position, wheels locked, bed/chair exit alarm in place, call light within reach, and non skid footwear on. Walkway free of clutter. Pt alert and oriented and able to make needs known. Pt educated to use call light when needing to get up, and pt utilizes call light to make needs known. Will continue to monitor. Goal: Absence of physical injury  Description: Absence of physical injury  3/23/2022 2035 by Brian Suárez RN  Outcome: Ongoing  Note: Pt is free of injury. No injury noted. Fall precautions in place. Call light within reach. Will monitor. 3/23/2022 0733 by Isis Cerrato RN  Outcome: Ongoing  Note: Pt is free of injury. No injury noted. Fall precautions in place. Call light within reach. Will monitor. Problem: Pain:  Goal: Pain level will decrease  Description: Pain level will decrease  3/23/2022 2035 by Brian Suárez RN  Outcome: Ongoing  3/23/2022 0733 by Isis Cerrato RN  Outcome: Ongoing  Note: Pt assessed for pain. Pt in pain and assessed with 0-10 pain rating scale. Pt given prescribed analgesic for pain. (See eMar) Pt satisfied with pain relief thus far. Will reassess and continue to monitor. Goal: Control of acute pain  Description: Control of acute pain  3/23/2022 2035 by Brian Suárez RN  Outcome: Ongoing  Note: Pt assessed for pain. Pt in pain and assessed with 0-10 pain rating scale. Pt given prescribed analgesic for pain.  (See eMar) Pt satisfied with pain relief thus far. Will reassess and continue to monitor. 3/23/2022 0733 by Javier Ramirez RN  Outcome: Ongoing  Note: Pt assessed for pain. Pt in pain and assessed with 0-10 pain rating scale. Pt given prescribed analgesic for pain. (See eMar) Pt satisfied with pain relief thus far. Will reassess and continue to monitor. Goal: Control of chronic pain  Description: Control of chronic pain  3/23/2022 2035 by Mary Perez RN  Outcome: Ongoing  3/23/2022 0733 by Javier Ramirez RN  Outcome: Ongoing  Note: Pt assessed for pain. Pt in pain and assessed with 0-10 pain rating scale. Pt given prescribed analgesic for pain. (See eMar) Pt satisfied with pain relief thus far. Will reassess and continue to monitor.     Electronically signed by Mary Perez RN on 3/23/2022 at 8:35 PM

## 2022-03-24 NOTE — PROGRESS NOTES
Patient is resting in bed. Alert and oriented X4. Complaining of pain, PRN pain medication given per order(See MAR). IV in place and infusing. Assessment complete. Patient educated on being NPO. Consent signed for tomorrow. All patient needs are met at this time. Fall precautions are in place. Call light is in reach. Will continue to monitor.    Electronically signed by Judi Flores RN on 3/23/2022 at 8:36 PM

## 2022-03-24 NOTE — PROGRESS NOTES
Hospitalist Progress Note      PCP: Ivelisse Harvey MD    Date of Admission: 3/22/2022    Chief Complaint:   Chief Complaint   Patient presents with    Abdominal Pain     patient with hx of bowel obstructions patient started with pain yesterday. patient with vomiting. Hospital Course:   Patient is a 52-year-old male with past medical history of marijuana abuse who presents to the hospital due to abdominal pain nausea vomiting, mentions for the past 2 days he has not been able to keep down any food or eat or drink. Patient mentions his abdominal pain is epigastric, 10/10 intermittent severity, he mentions he has a history of multiple abdominal surgeries and he has chronic abdominal pains. CT abd/pel: s/p splenectomy, left nephrectomy, gastric surgery. Cholelithiasis but no acute cholecystitis. Diverticulosis without diverticulitis. Some free fluid in the pelvis and a calculus that measures 2.2cm , unchanged. Chronic L1 severe compression injury. 03/23/2022: Patient continues with abdominal pain and endorses dry heaving this morning during my exam. He is frustrated not receiving any pain medication because \"his heart rate is always low\". He does tell me that he recently received IV ketamine infusion from his pain management specialist. I discussed GI consultation for his N/V/abdominal pain and he is agreeable - states he is supposed to have multiple scopes done in May      Subjective:     EGD today with Dr. Xander Trinh - anastomotic ulcer noted. I started carafate, clear liquid diet, and transitioned to PO pain medication. Hopefully he can tolerate advancing his diet tonight and discharge tomorrow.       Medications:  Reviewed    Infusion Medications    lactated ringers 75 mL/hr at 03/24/22 8445    sodium chloride       Scheduled Medications    sucralfate  1 g Oral 4 times per day    sodium chloride flush  10 mL IntraVENous 2 times per day    enoxaparin  40 mg SubCUTAneous Daily     PRN Meds: HYDROmorphone, oxyCODONE-acetaminophen, sodium chloride flush, sodium chloride, potassium chloride **OR** potassium alternative oral replacement **OR** potassium chloride, magnesium sulfate, magnesium hydroxide, acetaminophen **OR** acetaminophen, promethazine      Intake/Output Summary (Last 24 hours) at 3/24/2022 1515  Last data filed at 3/24/2022 1240  Gross per 24 hour   Intake 2390 ml   Output 400 ml   Net 1990 ml       Physical Exam Performed:    /71   Pulse (!) 36   Temp 97.8 °F (36.6 °C) (Temporal)   Resp 13   Ht 6' (1.829 m)   Wt 209 lb 14.1 oz (95.2 kg)   SpO2 94%   BMI 28.46 kg/m²     General appearance: No apparent distress, appears older than stated age and cooperative. HEENT: Pupils equal, round, and reactive to light. Conjunctivae/corneas clear. Neck: Supple, with full range of motion. No jugular venous distention. Trachea midline. Respiratory:  Normal respiratory effort. Clear to auscultation, bilaterally without Rales/Wheezes/Rhonchi. Cardiovascular: Slow rate and regular rhythm with normal S1/S2 without murmurs, rubs or gallops. Abdomen: Soft, tenderness throughout, non-distended with normal bowel sounds. Musculoskeletal: No clubbing, cyanosis or edema bilaterally. Full range of motion without deformity. Skin: Skin color, texture, turgor normal.  No rashes or lesions. Neurologic:  Neurovascularly intact without any focal sensory/motor deficits.  Cranial nerves: II-XII intact, grossly non-focal.  Psychiatric: Alert and oriented, thought content appropriate, normal insight  Capillary Refill: Brisk,< 3 seconds   Peripheral Pulses: +2 palpable, equal bilaterally       Labs:   Recent Labs     03/22/22  1418 03/23/22  0532   WBC 5.3 6.4   HGB 13.1* 12.0*   HCT 39.0* 36.9*    287     Recent Labs     03/22/22  1418 03/23/22  0532 03/24/22  0530    142 140   K 4.2 4.2 4.2    109 107   CO2 24 21 23   BUN 6* 6* 11   CREATININE 1.0 0.9 0.9   CALCIUM 9.2 9.1 8.5 Recent Labs     03/22/22  1418   AST 14*   ALT 10   BILIDIR <0.2   BILITOT 0.5   ALKPHOS 110     No results for input(s): INR in the last 72 hours. Recent Labs     03/23/22  1004   TROPONINI <0.01       Urinalysis:      Lab Results   Component Value Date    NITRU Negative 03/22/2022    WBCUA 1 03/22/2022    RBCUA 0 03/22/2022    BLOODU Negative 03/22/2022    SPECGRAV <=1.005 03/22/2022    GLUCOSEU Negative 03/22/2022    GLUCOSEU NEGATIVE 02/07/2011       Radiology:  CT ABDOMEN PELVIS W IV CONTRAST Additional Contrast? None   Final Result   1. Status post splenectomy and left nephrectomy. 2. Cholelithiasis but no acute cholecystitis. 3. Status post gastric surgery with no obvious complications. 4. Diverticulosis but no acute diverticulitis. 5. Some free fluid in the pelvis and a calculus which measures 2.2 cm also   previously seen and appears unchanged. 6. Chronic L1 severe compression injury. Assessment/Plan:    Active Hospital Problems    Diagnosis     Intractable nausea and vomiting [R11.2]      Intractable nausea, vomiting, and abdominal pain, unclear etiology. Hx of several abdominal surgeries. - CT abd/pel: s/p splenectomy, left nephrectomy, gastric surgery. Cholelithiasis but no acute cholecystitis. Diverticulosis without diverticulitis. Some free fluid in the pelvis and a calculus that measures 2.2cm , unchanged. Chronic L1 severe compression injury. - UA negative  - Antiemetics and analgesia as needed  - GI consulted for abd pain - EGD revealed small anastomotic ulcer  - UDS with cannabinoids - cannabinoid hyperemesis syndrome?  Discussed this with the patient  - Had ketamine infusion with  [facility], pain management on 03/15/2022  - The patient states that he has plans for upper endoscopy, colonoscopy, endoscopic ultrasound and/or ERCP with Dr. Laura Wu in May    Marked bradycardia  - Cardiology consulted, thank you for recs, will defer to them for additional testing

## 2022-03-24 NOTE — H&P
Pre-operative History and Physical    Patient: Wayne Concepcion  : 1970  Acct#:     Intended Procedure:  EGD    HISTORY OF PRESENT ILLNESS:  The patient is a 49 y.o. male  who presents for/due to Abdominal Pain       Past Medical History:        Diagnosis Date    Alcoholism ([facility] [address]     last drink     Allergic migraine with status migrainosus     Allergic rhinitis, seasonal     Anemia     Arthritis     right knee    Vaughn's esophagus     Benign intracranial hypertension     Cancer ([facility] [address]     renal     Carpal tunnel syndrome     Chronic pain     Depression     GERD (gastroesophageal reflux disease)     Headache(784.0)     History of blood transfusion     History of tobacco use     Quit 2014    Migraine     chronic for 1 year    Morbid obesity ([address]     Movement disorder     Onychomycosis     Sleep apnea, obstructive     Severe uses cpap stop bang     Unspecified cerebral artery occlusion with cerebral infarction     slight weakness in left arm    Use of cane as ambulatory aid     Wears dentures     full set    Wears glasses      Past Surgical History:        Procedure Laterality Date    ABDOMEN SURGERY      gastric bypass    ABDOMEN SURGERY  2016    repair of recurrent incisional hernia with mesh, removal of old mesh, bilateral component separation    ABDOMINAL EXPLORATION SURGERY  16    exp lap, lysis of adhesions, small bowel resection    CARPAL TUNNEL RELEASE      bilat    DILATATION, ESOPHAGUS      ENDOSCOPY, COLON, DIAGNOSTIC      EYE SURGERY      GASTRIC BYPASS SURGERY  2009    Has lost about 200 pounds.     HERNIA REPAIR  3-    ventral    JOINT REPLACEMENT      KIDNEY REMOVAL Left 2018    KNEE ARTHROSCOPY Right 2013    Dr.Robert Sanders     KNEE ARTHROSCOPY Right 12    RIGHT KNEE ARTHROSCOPY WITH CHONDROPLASTY    KNEE SURGERY  2012    right, arthroscopy    KNEE SURGERY Right 2020    INCISION AND DRAINAGE RIGHT KNEE AND WOULD VAC PLACEMENT performed by Charma Romberg, MD at . [address] Right 2/26/2021    INCISION AND DRAINAGE OF RIGHT KNEE HEMATOMA performed by Guillermo Wells MD at . [address] Right 3/5/2021    INCISION AND DRAINAGE AND CLOSURE RIGHT TOTAL KNEE performed by Guillermo Wells MD at [address]  2005    OTHER SURGICAL HISTORY Left 03/08/2016    CT biopsy ablasion left kidney    OTHER SURGICAL HISTORY  2016    small intestine 12 inch removed, 2 hernia surgeries, another surgery to drain infection from incision.  REVISION TOTAL KNEE ARTHROPLASTY Right 12/17/2019    RIGHT REVISION TIBIA TOTAL KNEE REPLACEMENT performed by Charma Romberg, MD at [facility] Right 1/22/2020    RIGHT KNEE IRRIGATION AND DEBRIDEMENT WITH POLY EXCHANGE performed by Franca Cho MD at [facility] Right 2/16/2021    RIGHT REMOVAL OF EXPLANT AND TOTAL KNEE REPLACEMENT [facility]  10/15[facility]    RIGHT    TOTAL KNEE ARTHROPLASTY Right 8/12/2020    RIGHT ARTHROPLASY  RESECTION WITH INSERTION OF SPACER performed by Charma Romberg, MD at [facility]  6-7-2016    UPPER GASTROINTESTINAL ENDOSCOPY  04/02/2018     Medications Prior to Admission:   Prior to Admission medications    Medication Sig Start Date End Date Taking?  Authorizing Provider   dicyclomine (BENTYL) 10 MG capsule Take 1 capsule by mouth every 6 hours as needed (cramps) 3/7/22   Leslie Stern MD   ondansetron (ZOFRAN ODT) 4 MG disintegrating tablet Take 1 tablet by mouth every 8 hours as needed for Nausea 3/7/22   Leslie Stern MD   atorvastatin (LIPITOR) 40 MG tablet TAKE 1 TABLET BY MOUTH EVERYDAY AT BEDTIME 1/10/22   Clarance Gosselin, MD   FLUoxetine (PROZAC) 20 MG capsule Take 1 capsule by mouth daily 12/17/21 Matthew Leach MD   traZODone (DESYREL) 100 MG tablet Take 2 tablets by mouth nightly 12/17/21   Matthew Leach MD   pantoprazole (PROTONIX) 40 MG tablet Take 1 tablet by mouth 2 times daily 9/27/21   Jessica Marie MD   clopidogrel (PLAVIX) 75 MG tablet Take 1 tablet by mouth daily 9/16/21   Rob Whaley MD   gabapentin (NEURONTIN) 600 MG tablet Take 600 mg by mouth 4 times daily. 8/2/21   Historical Provider, MD   sildenafil (REVATIO) 20 MG tablet Take 4 tablets by mouth as needed (30 min prior to intercourse) 1/11/21   Jessica Marie MD   calcium-vitamin D (OSCAL 500/200 D-3) 500-200 MG-UNIT per tablet Take 1 tablet by mouth 2 times daily     Historical Provider, MD   Multiple Vitamin TABS Take 1 tablet by mouth daily     Historical Provider, MD       Allergies:  Nsaids, Morphine, Prochlorperazine, Valtrex [valacyclovir hcl], Fentanyl, Morphine and related, and Tolmetin    Social History:   TOBACCO:   reports that he quit smoking about 10 months ago. His smoking use included cigarettes. He started smoking about 37 years ago. He has a 12.50 pack-year smoking history. He has never used smokeless tobacco.  ETOH:   reports no history of alcohol use. DRUGS:   reports current drug use. Drug: Marijuana Radha Peals). PHYSICAL EXAM:      Vital Signs: /66   Pulse (!) 37   Temp 98.6 °F (37 °C) (Temporal)   Resp 16   Ht 6' (1.829 m)   Wt 209 lb 14.1 oz (95.2 kg)   SpO2 92%   BMI 28.46 kg/m²    Airway: No stridor or wheezing noted. Good air movement  Pulmonary: without wheezes.   Clear to auscultation  Cardiac:regular rate and rhythm without loud murmurs  Abdomen:soft, nontender,  Bowel sounds present    Pre-Procedure Assessment / Plan:  1) EGD    ASA Grade:  ASA 3 - Patient with moderate systemic disease with functional limitations  Mallampati Classification:  Class I    Level of Sedation Plan:Deep sedation    Post Procedure plan: Return to same level of care    I assessed the patient and find that the patient is in satisfactory condition to proceed with the planned procedure and sedation plan. I have explained the risk, benefits, and alternatives to the procedure; the patient understands and agrees to proceed.        Teresita Alonso MD  3/24/2022

## 2022-03-24 NOTE — PROGRESS NOTES
Pt resting in bed with eyes closed. Easily aroused to voice and touch. No signs of pain or discomfort at this time. Diet has been advanced to clear liquid. Dietary called for a clear liquid tray. No acute needs or requests at this time. Call light in reach. Standard precautions in place. Will continue to monitor and assess.

## 2022-03-24 NOTE — PROGRESS NOTES
When going to administer dilaudid per order the plunger fell out of syringe causing for some medication to fall out. Medication was taken to [facility] to waste with second RN Charmaine. When wasting the medication in the omnicell it required no second finger print and was unable to try to waste medication again. Call placed to pharmacy and they stated there was no other way to waste the medication and to make a note with the second RN. This RN Wasted what was left in the dropped syringe with second RN.     Electronically signed by Ml Lemus RN on 3/24/2022 at 12:04 AM     Electronically signed by Gustavo Cottrell RN on 3/24/2022 at 12:47 AM

## 2022-03-24 NOTE — PLAN OF CARE
Problem: Falls - Risk of:  Goal: Will remain free from falls  3/24/2022 0843 by Ivette Lutz RN  Outcome: Ongoing  3/23/2022 2035 by Hayley Caba RN  Outcome: Ongoing  Goal: Absence of physical injury  3/24/2022 0843 by Ivette Lutz RN  Outcome: Ongoing  3/23/2022 2035 by Hayley Caba RN  Outcome: Ongoing     Problem: Pain:  Goal: Pain level will decrease  3/24/2022 0843 by Ivette Lutz RN  Outcome: Ongoing  3/23/2022 2035 by Hayley Caba RN  Outcome: Ongoing  Goal: Control of acute pain  3/24/2022 0843 by Ivette Lutz RN  Outcome: Ongoing  3/23/2022 2035 by Hayley Caba RN  Outcome: Ongoing  Goal: Control of chronic pain  3/24/2022 0843 by Ivette Lutz RN  Outcome: Ongoing  3/23/2022 2035 by Hayley Caba RN  Outcome: Ongoing

## 2022-03-24 NOTE — PROGRESS NOTES
Discussed with NP Clara Vargas that patients HR has been between 28-40 BPM this evening and that patient is requesting pain medication with a HR at 32 BPM currently. OK to give pain medication at this time and continue to monitor.    Electronically signed by Malka Mar RN on 3/23/2022 at 11:00 PM

## 2022-03-24 NOTE — ANESTHESIA POSTPROCEDURE EVALUATION
Department of Anesthesiology  Postprocedure Note    Patient: Taniya Linder  MRN: [id number]  YOB: 1970  Date of evaluation: 3/24/2022  Time:  2:21 PM     Procedure Summary     Date: 03/24/22 Room / Location: [address] [place] [address]    Anesthesia Start: 4065 Anesthesia Stop: 7331    Procedure: ESOPHAGOGASTRODUODENOSCOPY (N/A ) Diagnosis:       Nausea and vomiting, intractability of vomiting not specified, unspecified vomiting type      Abdominal pain, unspecified abdominal location      (NAUSEA AND VOMITING, ABDOMINAL PAIN)    Surgeons: Rolin Angelucci, MD Responsible Provider: Mechelle Lopes MD    Anesthesia Type: MAC ASA Status: 3          Anesthesia Type: MAC    Cande Phase I: Cande Score: 8    Cande Phase II:      Last vitals: Reviewed and per EMR flowsheets.        Anesthesia Post Evaluation    Patient location during evaluation: PACU  Patient participation: complete - patient participated  Level of consciousness: awake  Airway patency: patent  Nausea & Vomiting: no nausea and no vomiting  Cardiovascular status: blood pressure returned to baseline  Respiratory status: acceptable  Hydration status: stable  Multimodal analgesia pain management approach

## 2022-03-24 NOTE — CARE COORDINATION
Patient will return to home upon d/c.  He denies any needs or concerns at this time and is hopeful he will go home in am.   Electronically signed by Sandi Cardenas on 3/24/2022 at 4:14 PM

## 2022-03-24 NOTE — ANESTHESIA PRE PROCEDURE
Department of Anesthesiology  Preprocedure Note       Name:  Taniya Linder   Age:  49 y.o.  :  1970                                          MRN:  [id number]         Date:  3/24/2022      Surgeon: Raymundo Johnston):  Rolin Angelucci, MD    Procedure: Procedure(s):  ESOPHAGOGASTRODUODENOSCOPY    Medications prior to admission:   Prior to Admission medications    Medication Sig Start Date End Date Taking? Authorizing Provider   dicyclomine (BENTYL) 10 MG capsule Take 1 capsule by mouth every 6 hours as needed (cramps) 3/7/22   Verena Mcdonald MD   ondansetron (ZOFRAN ODT) 4 MG disintegrating tablet Take 1 tablet by mouth every 8 hours as needed for Nausea 3/7/22   Verena Mcdonald MD   atorvastatin (LIPITOR) 40 MG tablet TAKE 1 TABLET BY MOUTH EVERYDAY AT BEDTIME 1/10/22   Syed Bob MD   [facility]) 20 MG capsule Take 1 capsule by mouth daily 21   Cynthia Cook MD   traZODone (DESYREL) 100 MG tablet Take 2 tablets by mouth nightly 21   Cynthia Cook MD   pantoprazole (PROTONIX) 40 MG tablet Take 1 tablet by mouth 2 times daily 21   Syed Bob MD   clopidogrel (PLAVIX) 75 MG tablet Take 1 tablet by mouth daily 21   Josey Tobar MD   gabapentin (NEURONTIN) 600 MG tablet Take 600 mg by mouth 4 times daily.   21   Historical Provider, MD   sildenafil (REVATIO) 20 MG tablet Take 4 tablets by mouth as needed (30 min prior to intercourse) 21   Syed Bob MD   calcium-vitamin D (OSCAL 500/200 D-3) 500-200 MG-UNIT per tablet Take 1 tablet by mouth 2 times daily     Historical Provider, MD   Multiple Vitamin TABS Take 1 tablet by mouth daily     Historical Provider, MD       Current medications:    Current Facility-Administered Medications   Medication Dose Route Frequency Provider Last Rate Last Admin    HYDROmorphone (DILAUDID) injection 0.5 mg  0.5 mg IntraVENous Q3H PRN NapoleLILLIE Frazier - NP   0.5 mg at 22 [id number]    lactated ringers infusion   IntraVENous Continuous Shlomo Balderas MD 75 mL/hr at 03/24/22 0833 Rate Verify at 03/24/22 0833    sodium chloride flush 0.9 % injection 10 mL  10 mL IntraVENous 2 times per day Shlomo Balderas MD   10 mL at 03/23/22 1058    sodium chloride flush 0.9 % injection 10 mL  10 mL IntraVENous PRN Shlomo Balderas MD        0.9 % sodium chloride infusion  25 mL IntraVENous PRN Shlomo Balderas MD        potassium chloride (KLOR-CON M) extended release tablet 40 mEq  40 mEq Oral PRN Shlomo Balderas MD        Or    potassium bicarb-citric acid (EFFER-K) effervescent tablet 40 mEq  40 mEq Oral PRN Shlomo Balderas MD        Or    potassium chloride 10 mEq/100 mL IVPB (Peripheral Line)  10 mEq IntraVENous PRN Shlomo Balderas MD        magnesium sulfate 2000 mg in 50 mL IVPB premix  2,000 mg IntraVENous PRN Shlomo Balderas MD        enoxaparin (LOVENOX) injection 40 mg  40 mg SubCUTAneous Daily Shlomo Balderas MD   40 mg at 03/24/22 0820    magnesium hydroxide (MILK OF MAGNESIA) 400 MG/5ML suspension 30 mL  30 mL Oral Daily PRN Shlomo Balderas MD        acetaminophen (TYLENOL) tablet 650 mg  650 mg Oral Q6H PRN Shlomo Balderas MD        Or    acetaminophen (TYLENOL) suppository 650 mg  650 mg Rectal Q6H PRN Shlomo Balderas MD        promethazine (PHENERGAN) in sodium chloride 0.9% 50 mL IVPB 25 mg  25 mg IntraVENous Q6H PRN LILLIE Nicholson - CNP   Stopped at 03/24/22 0345       Allergies: Allergies   Allergen Reactions    Nsaids Nausea Only and Other (See Comments)     Hx of Barretts esophagus      Morphine Hives and Itching     Pt gets Hives/itching.  Does not tolerate     Prochlorperazine Other (See Comments)     No allergic reaction, patient reported sense of \"restlessness\" and fidgiting    Valtrex [Valacyclovir Hcl] Diarrhea    Fentanyl Itching    Morphine And Related Hives and Itching    Tolmetin Nausea Only     Hx of Barretts esophagus       Problem List:    Patient Active Problem List Diagnosis Code    Insomnia-chronic intermittent-- treated with edible THC G47.00    Vaughn esophagus-on daily ppi-last egd 10/20 Dr Segundo Griffiths K22.70    Allergic rhinitis, seasonal J30.2    Obstructive sleep apnea,Severe -(on cpap) G47.33    Depression-on daily effexor xr--sees dr Viviana Patterson. 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 Z90.81    Chronic pain syndrome- abdominal and head G89.4    History of stroke: right insular cortex on June 8, 2014 (small PFO; hypergoag w/u neg,  neurology) Z86.73    Gastroesophageal reflux disease with esophagitis--on daily ppi K21.00    Intractable chronic migraine without aura and with status migrainosus G43.711    Iron deficiency anemia D50.9    B12 deficiency E53.8    Malignant neoplasm of left kidney (HCC) clear cell. 8/1/18 Dr Shannon Bonds. C64.2    History of depression Z86.59    History of alcoholism ([facility] F10.21    History of total knee arthroplasty, right Z96.651    Hematoma of right knee region S80. Delphine Rink TIA (transient ischemic attack) LUE weakness 3/21 G45.9    Tobacco use Z72.0    Acute deep vein thrombosis (DVT) of calf muscle vein of both lower extremities (HCC) I82.463    Closed compression fracture of body of L1 vertebra (HCC) S32.010A    Osteopenia of multiple sites M85.89    Small bowel obstruction (HCC) K56.609    Contusion of left hip S70. 0XA    Current smoker F17.200    Intractable nausea and vomiting R11.2       Past Medical History:        Diagnosis Date    Alcoholism ([facility] [address]     last drink 2015    Allergic migraine with status migrainosus     Allergic rhinitis, seasonal     Anemia     Arthritis     right knee    Vaughn's esophagus     Benign intracranial hypertension     Cancer (HCC)     renal     Carpal tunnel syndrome     Chronic pain     Depression     GERD (gastroesophageal reflux disease)     Headache(784.0)     History of blood transfusion     History of tobacco use     Quit 7/2014    Migraine     chronic for 1 year    Morbid obesity ([address]     Movement disorder     Onychomycosis     Sleep apnea, obstructive     Severe uses cpap stop bang 6    Unspecified cerebral artery occlusion with cerebral infarction 2014    slight weakness in left arm    Use of cane as ambulatory aid     Wears dentures     full set    Wears glasses        Past Surgical History:        Procedure Laterality Date    ABDOMEN SURGERY      gastric bypass    ABDOMEN SURGERY  4-7-2016    repair of recurrent incisional hernia with mesh, removal of old mesh, bilateral component separation    ABDOMINAL EXPLORATION SURGERY  1/11/16    exp lap, lysis of adhesions, small bowel resection    CARPAL TUNNEL RELEASE      bilat    DILATATION, ESOPHAGUS      ENDOSCOPY, COLON, DIAGNOSTIC      EYE SURGERY      GASTRIC BYPASS SURGERY  Jan 2009    Has lost about 200 pounds.  HERNIA REPAIR  3-    ventral    JOINT REPLACEMENT      KIDNEY REMOVAL Left 08/01/2018    [facility]     KNEE ARTHROSCOPY Right 7/11/12    RIGHT KNEE ARTHROSCOPY WITH CHONDROPLASTY    KNEE SURGERY  July 2012    right, arthroscopy    KNEE SURGERY Right 2/18/2020    INCISION AND DRAINAGE RIGHT KNEE AND WOULD VAC PLACEMENT performed by Adonis Kenney MD at [address] Right 2/26/2021    INCISION AND DRAINAGE OF RIGHT KNEE HEMATOMA performed by Brook Hernandez MD at [address] Right 3/5/2021    INCISION AND DRAINAGE AND CLOSURE RIGHT TOTAL KNEE performed by Brook Hernandez MD at [address] [facility]  2005    OTHER SURGICAL HISTORY Left 03/08/2016    CT biopsy ablasion left kidney    OTHER SURGICAL HISTORY  2016    small intestine 12 inch removed, 2 hernia surgeries, another surgery to drain infection from incision.      REVISION TOTAL KNEE ARTHROPLASTY Right 12/17/2019    RIGHT REVISION TIBIA TOTAL KNEE REPLACEMENT [facility] Right 2020    RIGHT KNEE IRRIGATION AND DEBRIDEMENT WITH POLY EXCHANGE performed by Ailyn Ching MD at [facility] Right 2021    RIGHT REMOVAL OF EXPLANT AND TOTAL KNEE REPLACEMENT [facility]ad Domingo [facility] [address] [facility]  10/15/13    RIGHT    TOTAL KNEE ARTHROPLASTY Right 2020    RIGHT ARTHROPLASY  RESECTION WITH INSERTION OF SPACER performed by Franklin Holm MD at [facility]  2016    UPPER GASTROINTESTINAL ENDOSCOPY  2018       Social History:    Social History     Tobacco Use    Smoking status: Former Smoker     Packs/day: 0.50     Years: 25.00     Pack years: 12.50     Types: Cigarettes     Start date: 1985     Quit date: 2021     Years since quittin.8    Smokeless tobacco: Never Used    Tobacco comment: Patient vapes   Substance Use Topics    Alcohol use: No     Alcohol/week: 0.0 standard drinks     Comment: last drink                                 Counseling given: Not Answered  Comment: Patient vapes      Vital Signs (Current):   Vitals:    22 0432 22 0525 22 0552 22 0749   BP:   (!) 101/58 103/65   Pulse:   (!) 32 (!) 36   Resp:   16 13   Temp:    98.4 °F (36.9 °C)   TempSrc:    Oral   SpO2: 93%   92%   Weight:  209 lb 14.1 oz (95.2 kg)     Height:                                                  BP Readings from Last 3 Encounters:   22 103/65   22 123/78   22 104/70       NPO Status:                                                                                 BMI:   Wt Readings from Last 3 Encounters:   22 209 lb 14.1 oz (95.2 kg)   22 213 lb 10 oz (96.9 kg)   22 208 lb (94.3 kg)     Body mass index is 28.46 kg/m².     CBC:   Lab Results   Component Value Date    WBC 6.4 2022    RBC 4.10 2022 RBC 4.94 01/07/2015    HGB 12.0 03/23/2022    HCT 36.9 03/23/2022    MCV 89.9 03/23/2022    RDW 15.8 03/23/2022     03/23/2022       CMP:   Lab Results   Component Value Date     03/24/2022    K 4.2 03/24/2022     03/24/2022    CO2 23 03/24/2022    BUN 11 03/24/2022    CREATININE 0.9 03/24/2022    GFRAA >60 03/24/2022    AGRATIO 2.1 03/07/2022    LABGLOM >60 03/24/2022    GLUCOSE 75 03/24/2022    GLUCOSE 84 01/07/2015    PROT 6.6 03/22/2022    PROT 6.7 01/07/2015    CALCIUM 8.5 03/24/2022    BILITOT 0.5 03/22/2022    ALKPHOS 110 03/22/2022    AST 14 03/22/2022    ALT 10 03/22/2022       POC Tests: No results for input(s): POCGLU, POCNA, POCK, POCCL, POCBUN, POCHEMO, POCHCT in the last 72 hours.     Coags:   Lab Results   Component Value Date    PROTIME 13.2 08/05/2021    INR 1.16 08/05/2021    APTT 37.6 02/09/2021       HCG (If Applicable): No results found for: PREGTESTUR, PREGSERUM, HCG, HCGQUANT     ABGs: No results found for: PHART, PO2ART, UBB8RMD, MWI2BAL, BEART, P6JNAZIK     Type & Screen (If Applicable):  No results found for: LABABO, LABRH    Drug/Infectious Status (If Applicable):  No results found for: HIV, HEPCAB    COVID-19 Screening (If Applicable):   Lab Results   Component Value Date    COVID19 Not Detected 01/18/2022    COVID19 Not Detected 02/12/2021    COVID19 NOT DETECTED 08/06/2020           Anesthesia Evaluation  Patient summary reviewed and Nursing notes reviewed no history of anesthetic complications:   Airway: Mallampati: I        Dental:    (+) edentulous      Pulmonary: breath sounds clear to auscultation  (+) sleep apnea: on CPAP,                             Cardiovascular:  Exercise tolerance: good (>4 METS),   (+) dysrhythmias (bradycardia with loop recorder):, hyperlipidemia    (-) past MI, CAD,  CHF and orthopnea      Rhythm: regular  Rate: normal                 ROS comment: TTE 2021:     Conclusions      Summary   Normal left ventricle size, wall thickness, and systolic function with an   estimated ejection fraction of 55%. No regional wall motion abnormalities   are seen. A bubble study was performed and fails to show evidence of right to left   shunting. A 25 mm Amplatzer PFO occluder device was seen and appears well seated   between the right and left atria. No evidence of any pericardial effusion. Neuro/Psych:   (+) CVA (intermittent left sided weakness, short term memory loss): residual symptoms, TIA, headaches: migraine headaches, psychiatric history (ETOH abuse):depression/anxiety              ROS comment: History of kyphoplasty GI/Hepatic/Renal:   (+) GERD:,          ROS comment: Abdominal pain and N+V for admission. Endo/Other: Negative Endo/Other ROS                    Abdominal:             Vascular: negative vascular ROS. Other Findings:           Anesthesia Plan      MAC     ASA 3     (Discussed risks and benefits to sedation including nausea, vomiting, allergic reaction, headache, delayed cognitive recovery, stroke, heart attack, respiratory depression, and death which patient understood and agreed to proceed. The patient was given the opportunity to ask questions and all questions were answered to the patient's satisfaction.  )  Induction: intravenous. Anesthetic plan and risks discussed with patient. Plan discussed with CRNA. Attending anesthesiologist reviewed and agrees with Preprocedure content    This pre-anesthesia assessment may be used as a history and physical.    DOS STAFF ADDENDUM:    Pt seen and examined, chart reviewed (including anesthesia, drug and allergy history). No interval changes to history and physical examination. Anesthetic plan, risks, benefits, alternatives, and personnel involved discussed with patient. Patient verbalized an understanding and agrees to proceed.       Carolina Reddy MD  March 24, 2022  11:59 AM            Carolina Reddy MD   3/24/2022

## 2022-03-24 NOTE — PROGRESS NOTES
Pt is alert and oriented x4, resting quietly in bed. medications tolerated well. Complaints of pain 7/10 with prn meds given at 0623. Pt does want dilaudid when it is due again. Standard Fall precautions in place. Call light within reach. no acute needs noted. Will continue to monitor.

## 2022-03-24 NOTE — OP NOTE
Endoscopy Note    Patient: Mirtha Ambrose  : 1970  Acct#:     Procedure: Esophagogastroduodenoscopy                         Date:  3/24/2022     Surgeon:   Judge Homer MD    Referring Physician:  Maggie Hsu MD    Indications: This is a 49y.o. year old male who presents today with Abdominal Pain    Postoperative Diagnosis:  1. Small 2-3 mm non-bleeding anastomotic ulcer    Anesthesia:  The patient was administered IV propofol per anesthesiology team.  Please see their operative records for full details. Consent:  The patient or their legal guardian has signed an informed consent, and is aware of the potential risks, benefits, alternatives, and potential complications of this procedure. These include, but are not limited to hemorrhage, bleeding, post procedural pain, perforation, phlebitis, aspiration, hypotension, hypoxia, cardiovascular events such as arryhthmia, and possibly death. Description of Procedure: The patient was then taken to the endoscopy suite, placed in the left lateral decubitus position and the above IV sedation was administrered. The Olympus video endoscope was placed through the patient's oropharynx without difficulty to the extent of the 2nd portion of the duodenum. Both forward and retroflexed views of the stomach were obtained. Findings:    Esophagus: The esophagus appeared normal without evidence of Vaughn's esophagus or reflux esophagitis. Stomach: The stomach had evidence of tasha-en-y gastric bypass. A small 2-3 mm non-bleeding anastomotic ulcer was present. No bleeding present. No stricture present. The gastric pouch otherwise appeared normal on forward and retroflexed views.      Jejunum: The afferent and efferent limbs appeared normal with normal villous pattern    Estimated Blood Loss (mL): < 5 cc    Complications: None    Specimens:  None     The scope was then withdrawn back into the stomach, it was decompressed, and the scope was completely withdrawn. The patient tolerated the procedure well and was taken to the post anesthesia care unit in good condition. Impression:   1) See post procedure diagnoses    Recommendations:   1) Clear liquid diet. Advance diet as tolerated  2) Keep on BID PPI and will add Carafate 1 gram QID. Not sure this is the cause. Recommend avoiding Marijuana as well given concern for Cannabinoid induced hyperemesis.      Patricia Alaniz MD  [address] and [address] [facility]

## 2022-03-25 VITALS
RESPIRATION RATE: 18 BRPM | OXYGEN SATURATION: 93 % | SYSTOLIC BLOOD PRESSURE: 122 MMHG | WEIGHT: 202.6 LBS | TEMPERATURE: 98.3 F | DIASTOLIC BLOOD PRESSURE: 74 MMHG | BODY MASS INDEX: 27.44 KG/M2 | HEART RATE: 44 BPM | HEIGHT: 72 IN

## 2022-03-25 LAB
ANION GAP SERPL CALCULATED.3IONS-SCNC: 13 MMOL/L (ref 3–16)
BUN BLDV-MCNC: 9 MG/DL (ref 7–20)
CALCIUM SERPL-MCNC: 9.1 MG/DL (ref 8.3–10.6)
CHLORIDE BLD-SCNC: 105 MMOL/L (ref 99–110)
CO2: 23 MMOL/L (ref 21–32)
CREAT SERPL-MCNC: 0.9 MG/DL (ref 0.9–1.3)
GFR AFRICAN AMERICAN: >60
GFR NON-AFRICAN AMERICAN: >60
GLUCOSE BLD-MCNC: 77 MG/DL (ref 70–99)
POTASSIUM REFLEX MAGNESIUM: 4 MMOL/L (ref 3.5–5.1)
SODIUM BLD-SCNC: 141 MMOL/L (ref 136–145)

## 2022-03-25 PROCEDURE — 94761 N-INVAS EAR/PLS OXIMETRY MLT: CPT

## 2022-03-25 PROCEDURE — 6370000000 HC RX 637 (ALT 250 FOR IP): Performed by: NURSE PRACTITIONER

## 2022-03-25 PROCEDURE — 80048 BASIC METABOLIC PNL TOTAL CA: CPT

## 2022-03-25 PROCEDURE — 36415 COLL VENOUS BLD VENIPUNCTURE: CPT

## 2022-03-25 RX ORDER — SUCRALFATE 1 G/1
1 TABLET ORAL 4 TIMES DAILY
Qty: 120 TABLET | Refills: 3 | Status: SHIPPED | OUTPATIENT
Start: 2022-03-25 | End: 2022-05-04

## 2022-03-25 RX ORDER — OXYCODONE HYDROCHLORIDE AND ACETAMINOPHEN 5; 325 MG/1; MG/1
1 TABLET ORAL EVERY 6 HOURS PRN
Qty: 16 TABLET | Refills: 0 | Status: SHIPPED | OUTPATIENT
Start: 2022-03-25 | End: 2022-03-30

## 2022-03-25 RX ADMIN — SUCRALFATE 1 G: 1 TABLET ORAL at 11:11

## 2022-03-25 RX ADMIN — OXYCODONE HYDROCHLORIDE AND ACETAMINOPHEN 1 TABLET: 5; 325 TABLET ORAL at 11:11

## 2022-03-25 RX ADMIN — OXYCODONE HYDROCHLORIDE AND ACETAMINOPHEN 1 TABLET: 5; 325 TABLET ORAL at 05:00

## 2022-03-25 RX ADMIN — SUCRALFATE 1 G: 1 TABLET ORAL at 00:02

## 2022-03-25 RX ADMIN — SUCRALFATE 1 G: 1 TABLET ORAL at 05:00

## 2022-03-25 ASSESSMENT — PAIN DESCRIPTION - PAIN TYPE
TYPE: CHRONIC PAIN
TYPE: CHRONIC PAIN

## 2022-03-25 ASSESSMENT — PAIN DESCRIPTION - DESCRIPTORS
DESCRIPTORS: ACHING
DESCRIPTORS: ACHING

## 2022-03-25 ASSESSMENT — PAIN DESCRIPTION - ONSET
ONSET: ON-GOING
ONSET: ON-GOING

## 2022-03-25 ASSESSMENT — PAIN DESCRIPTION - LOCATION
LOCATION: ABDOMEN
LOCATION: ABDOMEN

## 2022-03-25 ASSESSMENT — PAIN DESCRIPTION - ORIENTATION
ORIENTATION: MID
ORIENTATION: MID

## 2022-03-25 ASSESSMENT — PAIN DESCRIPTION - PROGRESSION
CLINICAL_PROGRESSION: NOT CHANGED
CLINICAL_PROGRESSION: NOT CHANGED

## 2022-03-25 ASSESSMENT — PAIN SCALES - GENERAL
PAINLEVEL_OUTOF10: 0
PAINLEVEL_OUTOF10: 5
PAINLEVEL_OUTOF10: 7

## 2022-03-25 ASSESSMENT — PAIN DESCRIPTION - FREQUENCY
FREQUENCY: INTERMITTENT
FREQUENCY: INTERMITTENT

## 2022-03-25 NOTE — DISCHARGE INSTR - COC
Continuity of Care Form    Patient Name: Jaden Acosta   :  1970  MRN:  [id number]    Admit date:  3/22/2022  Discharge date:  ***    Code Status Order: Full Code   Advance Directives:   Advance Care Flowsheet Documentation       Date/Time Healthcare Directive Type of Healthcare Directive Copy in [address] Agent's Name Healthcare Agent's Phone Number    [phone] Yes, patient has an advance directive for healthcare treatment -- Yes, copy in chart -- -- --            Admitting Physician:  Sam Sloan MD  PCP: Margie Sullivan MD    Discharging Nurse: [facility] Unit/Room#: Y0E-4089/2229-32  Discharging Unit Phone Number: ***    Emergency Contact:   Extended Emergency Contact Information  Primary Emergency Contact: Jose Angel [facility]  Address: [address] Phone: [phone]  Mobile Phone: [phone]  Relation: Spouse  Secondary Emergency Contact: Leyda Klein  Phone: [phone]  Relation: Parent    Past Surgical History:  Past Surgical History:   Procedure Laterality Date    ABDOMEN SURGERY      gastric bypass    ABDOMEN SURGERY  2016    repair of recurrent incisional hernia with mesh, removal of old mesh, bilateral component separation    ABDOMINAL EXPLORATION SURGERY  16    exp lap, lysis of adhesions, small bowel resection    CARPAL TUNNEL RELEASE      bilat    DILATATION, ESOPHAGUS      ENDOSCOPY, COLON, DIAGNOSTIC      EYE SURGERY      GASTRIC BYPASS SURGERY  2009    Has lost about 200 pounds.     HERNIA REPAIR  3-    ventral    JOINT REPLACEMENT      KIDNEY REMOVAL Left 2018    KNEE ARTHROSCOPY Right 2013    Dr.Robert Sanders     KNEE ARTHROSCOPY Right 12    RIGHT KNEE ARTHROSCOPY WITH CHONDROPLASTY    KNEE SURGERY  2012    right, arthroscopy    KNEE SURGERY Right 2020    INCISION AND DRAINAGE RIGHT KNEE AND WOULD VAC PLACEMENT performed by Martin Jaramillo MD at [place] [address] Right 2/26/2021    INCISION AND DRAINAGE OF RIGHT KNEE HEMATOMA performed by Kip Thakkar MD at [place] [address] Right 3/5/2021    INCISION AND DRAINAGE AND CLOSURE RIGHT TOTAL KNEE performed by Kip Thakkar MD at [address] [place] [address]  2005    OTHER SURGICAL HISTORY Left 03/08/2016    CT biopsy ablasion left kidney    OTHER SURGICAL HISTORY  2016    small intestine 12 inch removed, 2 hernia surgeries, another surgery to drain infection from incision.      REVISION TOTAL KNEE ARTHROPLASTY Right 12/17/2019    RIGHT REVISION TIBIA TOTAL KNEE REPLACEMENT performed by Martin Jaramillo MD at [place] [address] Right 1/22/2020    RIGHT KNEE IRRIGATION AND DEBRIDEMENT WITH POLY EXCHANGE performed by Daniel Lipscomb MD at [place] [address] Right 2/16/2021    RIGHT REMOVAL OF EXPLANT AND TOTAL KNEE REPLACEMENT performed by Kip Thakkar MD at [id number] [place] [address]  10/15/13    RIGHT    TOTAL KNEE ARTHROPLASTY Right 8/12/2020    RIGHT ARTHROPLASY  RESECTION WITH INSERTION OF SPACER performed by Martin Jaramillo MD at [address] [place] [address]  6-7-2016    UPPER GASTROINTESTINAL ENDOSCOPY  04/02/2018    UPPER GASTROINTESTINAL ENDOSCOPY N/A 3/24/2022    ESOPHAGOGASTRODUODENOSCOPY performed by Vahid Thurman MD at [facility] ENDOSCOPY       Immunization History:   Immunization History   Administered Date(s) Administered    COVID-19, Pfizer Purple top, DILUTE for use, 12+ yrs, 30mcg/0.3mL dose 04/04/2021, 04/26/2021, 12/01/2021    HIB PRP-T (ActHIB, Hiberix) 09/13/2010    Hib, unspecified 02/09/2015    Influenza Virus Vaccine 02/03/2018    Influenza Whole 10/04/2012    Influenza, Intradermal, Preservative free 10/28/2014    Influenza, Intradermal, Quadrivalent, Preservative Free 12/13/2016    Influenza, Quadv, IM, PF (6 mo and older Fluzone, Flulaval, Fluarix, and 3 yrs and older Afluria) 12/18/2019, 11/10/2020    Influenza, Quadv, Recombinant, IM PF (Flublok 18 yrs and older) 10/15/2018    Meningococcal ACWY Vaccine 01/15/2009    Meningococcal MCV4O (Menveo) 08/10/2019, 10/30/2019    Meningococcal MCV4P (Menactra) 02/09/2015, 08/17/2019, 10/30/2019    Meningococcal MPSV4 (Menomune) 01/15/2009    Pneumococcal Conjugate 13-valent ([id number]) 02/09/2015    Pneumococcal Polysaccharide ([id number]) 01/15/2009, 10/09/2013, 08/09/2019    Td, unspecified formulation 01/01/2007    Tdap (Boostrix, Adacel) 01/01/2007, 05/03/2014    Zoster Recombinant (Shingrix) 12/07/2021, 03/04/2022       Active Problems:  Patient Active Problem List   Diagnosis Code    Insomnia-chronic intermittent-- treated with edible THC G47.00    Vaughn esophagus-on daily ppi-last egd 10/20 Dr Florina Angel K22.70    Allergic rhinitis, seasonal J30.2    Obstructive sleep apnea,Severe -(on cpap) G47.33    Depression-on daily effexor xr--sees dr Yudy Aaron. 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 Z90.81    Chronic pain syndrome- abdominal and head G89.4    History of stroke: right insular cortex on June 8, 2014 (small PFO; hypergoag w/u neg,  neurology) Z86.73    Gastroesophageal reflux disease with esophagitis--on daily ppi K21.00    Intractable chronic migraine without aura and with status migrainosus G43.711    Iron deficiency anemia D50.9    B12 deficiency E53.8    Malignant neoplasm of left kidney (HCC) clear cell. 8/1/18 Dr Leticia Haji. C64.2    History of depression Z86.59    History of alcoholism ([address] F10.21    History of total knee arthroplasty, right Z96.651    Hematoma of right knee region S80. 01XA    TIA (transient ischemic attack) LUE weakness 3/21 G45.9    Tobacco use Z72.0    Acute deep vein thrombosis (DVT) of calf muscle vein of both lower extremities ([facility]) I82.463    Closed compression fracture of body of L1 Impairments/Disabilities:505914635}    Nutrition Therapy:  Current Nutrition Therapy:   [address] JEIMY Diet List:509400194}    Routes of Feeding: {CHP DME Other Feedings:264149614}  Liquids: {Slp liquid thickness:44066}  Daily Fluid Restriction: {CHP DME Yes amt example:651390295}  Last Modified Barium Swallow with Video (Video Swallowing Test): {Done Not Done YTW:189250528}    Treatments at the Time of Hospital Discharge:   Respiratory Treatments: ***  Oxygen Therapy:  {Therapy; copd oxygen:61844}  Ventilator:    { CC Vent UFPT:178261427}    Rehab Therapies: {THERAPEUTIC INTERVENTION:2439258387}  Weight Bearing Status/Restrictions: 50Patria NIÑO Weight Bearin}  Other Medical Equipment (for information only, NOT a DME order):  {EQUIPMENT:146059041}  Other Treatments: ***    Patient's personal belongings (please select all that are sent with patient):  {[facility] DME Belongings:809877613}    RN SIGNATURE:  {Esignature:923596796}    CASE MANAGEMENT/SOCIAL WORK SECTION    Inpatient Status Date: ***    Readmission Risk Assessment Score:  Readmission Risk              Risk of Unplanned Readmission:  26           Discharging to Facility/ Agency   Name:   Address:  Phone:  Fax:    Dialysis Facility (if applicable)   Name:  Address:  Dialysis Schedule:  Phone:  Fax:    / signature: {Esignature:719273956}    PHYSICIAN SECTION    Prognosis: {Prognosis:5912752417}    Condition at Discharge: [address] Patient Condition:169069899}    Rehab Potential (if transferring to Rehab): {Prognosis:9077810037}    Recommended Labs or Other Treatments After Discharge: ***    Physician Certification: I certify the above information and transfer of Mikael Brown  is necessary for the continuing treatment of the diagnosis listed and that he requires {Admit to Appropriate Level of Care:94952} for {GREATER/LESS:296088102} 30 days.      Update Admission H&P: {CHP DME Changes in SVYUB:199051193}    PHYSICIAN SIGNATURE: {[facility]:838513312}

## 2022-03-25 NOTE — DISCHARGE SUMMARY
Hospital Medicine Discharge Summary    Patient ID: Baljeet Ferrera      Patient's PCP: Shwetha Canela MD    Admit Date: 3/22/2022     Discharge Date:   03/25/22    Admitting Physician: Katt Jay MD     Discharge Physician: Tavia Mnesah. Marco Rico - ABDOULAYE     Discharge Diagnoses: Active Hospital Problems    Diagnosis     Intractable nausea and vomiting [R11.2]        The patient was seen and examined on day of discharge and this discharge summary is in conjunction with any daily progress note from day of discharge. Hospital Course:     Patient is a 28-year-old male with past medical history of marijuana abuse who presents to the hospital due to abdominal pain nausea vomiting, mentions for the past 2 days he has not been able to keep down any food or eat or drink.  Patient mentions his abdominal pain is epigastric, 10/10 intermittent severity, he mentions he has a history of multiple abdominal surgeries and he has chronic abdominal pains.     CT abd/pel: s/p splenectomy, left nephrectomy, gastric surgery. Cholelithiasis but no acute cholecystitis. Diverticulosis without diverticulitis. Some free fluid in the pelvis and a calculus that measures 2.2cm , unchanged. Chronic L1 severe compression injury.      03/23/2022: Patient continues with abdominal pain and endorses dry heaving this morning during my exam. He is frustrated not receiving any pain medication because \"his heart rate is always low\". He does tell me that he recently received IV ketamine infusion from his pain management specialist. I discussed GI consultation for his N/V/abdominal pain and he is agreeable - states he is supposed to have multiple scopes done in May    03/24/2022: EGD today with Dr. Sky Portillo - anastomotic ulcer noted. I started carafate, clear liquid diet, and transitioned to PO pain medication    03/25/2022: Feeling better today, pain has decreased significantly since starting carafate per patient report.  Able to tolerate a diet. He is ready for discharge home. Intractable nausea, vomiting, and abdominal pain, unclear etiology. Hx of several abdominal surgeries. - CT abd/pel: s/p splenectomy, left nephrectomy, gastric surgery. Cholelithiasis but no acute cholecystitis. Diverticulosis without diverticulitis. Some free fluid in the pelvis and a calculus that measures 2.2cm , unchanged. Chronic L1 severe compression injury. - UA negative  - Antiemetics and analgesia as needed  - GI consulted for abd pain - EGD revealed small anastomotic ulcer  - UDS with cannabinoids - cannabinoid hyperemesis syndrome? Discussed cessation with the patient - he uses medical marijuana  - Had ketamine infusion with Dr. Louis Pardo, pain management on 03/15/2022  - The patient states that he has plans for upper endoscopy, colonoscopy, endoscopic ultrasound and/or ERCP with Dr. Gonsalo Bell in May     Marked bradycardia  - Cardiology consulted- no intervention required  - TSH normal     Hx of OSCAR, on CPAP at home. - Continue     Anxiety/depression, mood is stable. - Holding home medications for now given bradycardia     GERD - w/out active signs/sxs of dysphagia/odynophagia. No evidence of active PUD or hx of GI bleed. Controlled on home PPI - continue    Physical Exam Performed:     /74   Pulse (!) 44   Temp 98.3 °F (36.8 °C) (Oral)   Resp 18   Ht 6' (1.829 m)   Wt 202 lb 9.6 oz (91.9 kg)   SpO2 92%   BMI 27.48 kg/m²       General appearance:  No apparent distress, appears stated age and cooperative. HEENT:  Normal cephalic, atraumatic without obvious deformity. Pupils equal, round, and reactive to light. Extra ocular muscles intact. Conjunctivae/corneas clear. Neck: Supple, with full range of motion. No jugular venous distention. Trachea midline. Respiratory:  Normal respiratory effort. Clear to auscultation, bilaterally without Rales/Wheezes/Rhonchi.   Cardiovascular:  Regular rate and rhythm with normal S1/S2 without murmurs, rubs or gallops. Abdomen: Soft, non-tender, non-distended with normal bowel sounds. Musculoskeletal:  No clubbing, cyanosis or edema bilaterally. Full range of motion without deformity. Skin: Skin color, texture, turgor normal.  No rashes or lesions. Neurologic:  Neurovascularly intact without any focal sensory/motor deficits. Cranial nerves: II-XII intact, grossly non-focal.  Psychiatric:  Alert and oriented, thought content appropriate, normal insight  Capillary Refill: Brisk,< 3 seconds   Peripheral Pulses: +2 palpable, equal bilaterally       Labs: For convenience and continuity at follow-up the following most recent labs are provided:      CBC:    Lab Results   Component Value Date    WBC 6.4 03/23/2022    HGB 12.0 03/23/2022    HCT 36.9 03/23/2022     03/23/2022       Renal:    Lab Results   Component Value Date     03/25/2022    K 4.0 03/25/2022     03/25/2022    CO2 23 03/25/2022    BUN 9 03/25/2022    CREATININE 0.9 03/25/2022    CALCIUM 9.1 03/25/2022    PHOS 3.4 01/19/2016         Significant Diagnostic Studies    Radiology:   CT ABDOMEN PELVIS W IV CONTRAST Additional Contrast? None   Final Result   1. Status post splenectomy and left nephrectomy. 2. Cholelithiasis but no acute cholecystitis. 3. Status post gastric surgery with no obvious complications. 4. Diverticulosis but no acute diverticulitis. 5. Some free fluid in the pelvis and a calculus which measures 2.2 cm also   previously seen and appears unchanged. 6. Chronic L1 severe compression injury.                 Consults:     IP CONSULT TO CARDIOLOGY  IP CONSULT TO GI    Disposition:  Home     Condition at Discharge: Stable    Discharge Instructions/Follow-up:      Follow up with PCP in 1 week for hospital follow up    Follow up with Dr. Brian Valderrama    Code Status:  Full Code     Activity: activity as tolerated    Diet: regular diet      Discharge Medications:     Current Discharge Medication List           Details dicyclomine (BENTYL) 10 MG capsule Take 1 capsule by mouth every 6 hours as needed (cramps)  Qty: 20 capsule, Refills: 0      ondansetron (ZOFRAN ODT) 4 MG disintegrating tablet Take 1 tablet by mouth every 8 hours as needed for Nausea  Qty: 20 tablet, Refills: 0      atorvastatin (LIPITOR) 40 MG tablet TAKE 1 TABLET BY MOUTH EVERYDAY AT BEDTIME  Qty: 90 tablet, Refills: 1      FLUoxetine (PROZAC) 20 MG capsule Take 1 capsule by mouth daily  Qty: 90 capsule, Refills: 1      traZODone (DESYREL) 100 MG tablet Take 2 tablets by mouth nightly  Qty: 180 tablet, Refills: 1      pantoprazole (PROTONIX) 40 MG tablet Take 1 tablet by mouth 2 times daily  Qty: 180 tablet, Refills: 1      clopidogrel (PLAVIX) 75 MG tablet Take 1 tablet by mouth daily  Qty: 90 tablet, Refills: 1      gabapentin (NEURONTIN) 600 MG tablet Take 600 mg by mouth 4 times daily. sildenafil (REVATIO) 20 MG tablet Take 4 tablets by mouth as needed (30 min prior to intercourse)  Qty: 30 tablet, Refills: 0      calcium-vitamin D (OSCAL 500/200 D-3) 500-200 MG-UNIT per tablet Take 1 tablet by mouth 2 times daily       Multiple Vitamin TABS Take 1 tablet by mouth daily              Time Spent on discharge is more than 30 minutes in the examination, evaluation, counseling and review of medications and discharge plan. Signed:    Esther Mg NP   3/25/2022      Thank you Juliet Mahan MD for the opportunity to be involved in this patient's care. If you have any questions or concerns please feel free to contact me at [phone].

## 2022-03-25 NOTE — PLAN OF CARE
Problem: Falls - Risk of:  Goal: Will remain free from falls  Description: Will remain free from falls  3/24/2022 2108 by Stephani Heart RN  Outcome: Ongoing  Note: Fall risk assessment completed. Fall precautions in place. Call light within reach. Pt educated on calling for assistance before getting up. Walkway free of clutter. Will continue to monitor. 3/24/2022 [id number] by Aakash Nunes RN  Outcome: Ongoing  Note: No falls this shift  Goal: Absence of physical injury  Description: Absence of physical injury  3/24/2022 2108 by Stephani Heart RN  Outcome: Ongoing  Note: Pt is free of injury. No injury noted. Fall precautions in place. Call light within reach. Will monitor. 3/24/2022 0843 by Aakash Nunes RN  Outcome: Ongoing  Note: No injury noted     Problem: Pain:  Goal: Pain level will decrease  Description: Pain level will decrease  3/24/2022 2108 by Stephani Heart RN  Outcome: Ongoing  3/24/2022 0843 by Aakash Nunes RN  Outcome: Ongoing  Note: Pain is being managed with prn dilaudid. Pt able to make need known  Goal: Control of acute pain  Description: Control of acute pain  3/24/2022 2108 by Stephani Heart RN  Outcome: Ongoing  Note: Pt assessed for pain. Pt in pain and assessed with 0-10 pain rating scale. Pt given prescribed analgesic for pain. (See eMar) Pt satisfied with pain relief thus far. Will reassess and continue to monitor. 3/24/2022 0843 by Aakash Nunes RN  Outcome: Ongoing  Note: Pain is being managed with prn dilaudid. Pt able to make need known  Goal: Control of chronic pain  Description: Control of chronic pain  3/24/2022 2108 by Stephani Heart RN  Outcome: Ongoing  3/24/2022 0843 by Aakash Nunes RN  Outcome: Ongoing  Note: Pain is being managed with prn dilaudid.  Pt able to make need known   Electronically signed by Stephani Heart RN on 3/24/2022 at 9:08 PM

## 2022-03-25 NOTE — PLAN OF CARE
Problem: Falls - Risk of:  Goal: Will remain free from falls  3/25/2022 1023 by Ashkan Suggs RN  Outcome: Ongoing  3/24/2022 2108 by Kelly Egan RN  Outcome: Ongoing  Goal: Absence of physical injury  3/25/2022 1023 by Ashkan Suggs RN  Outcome: Ongoing  3/24/2022 2108 by Kelly Egan RN  Outcome: Ongoing     Problem: Pain:  Goal: Pain level will decrease  3/25/2022 1023 by Ashkan Suggs RN  Outcome: Ongoing  3/24/2022 2108 by Kelly Egan RN  Outcome: Ongoing  Goal: Control of acute pain  3/25/2022 1023 by Ashkan Suggs RN  Outcome: Ongoing  3/24/2022 2108 by Kelly Egan RN  Outcome: Ongoing  Goal: Control of chronic pain  3/25/2022 1023 by Ashkan Suggs RN  Outcome: Ongoing  3/24/2022 2108 by Kelly Egan RN  Outcome: Ongoing

## 2022-03-25 NOTE — PROGRESS NOTES
INPATIENT PROGRESS NOTE        IDENTIFYING DATA/REASON FOR CONSULTATION   PATIENT:  Iraida Wolfe  MRN:  [id number]  ADMIT DATE: 3/22/2022  TIME OF EVALUATION: 3/25/2022 9:51 AM  HOSPITAL STAY:   LOS: 3 days   CONSULTING PHYSICIAN: Rupesh Escobar MD   REASON FOR CONSULTATION: Nausea, vomiting, epigastric abdominal pain    Subjective:    Patient seen in follow up. EGD yesterday showed a small nonbleeding anastomotic ulcer otherwise unremarkable. Patient reports he is feeling better today. Tolerated liquids yesterday after procedure    MEDICATIONS   SCHEDULED:  sucralfate, 1 g, 4 times per day  sodium chloride flush, 10 mL, 2 times per day  enoxaparin, 40 mg, Daily      FLUIDS/DRIPS:     lactated ringers 75 mL/hr at 03/24/22 1942    sodium chloride       PRNs: HYDROmorphone, 0.5 mg, Q4H PRN  oxyCODONE-acetaminophen, 1 tablet, Q6H PRN  sodium chloride flush, 10 mL, PRN  sodium chloride, 25 mL, PRN  potassium chloride, 40 mEq, PRN   Or  potassium alternative oral replacement, 40 mEq, PRN   Or  potassium chloride, 10 mEq, PRN  magnesium sulfate, 2,000 mg, PRN  magnesium hydroxide, 30 mL, Daily PRN  acetaminophen, 650 mg, Q6H PRN   Or  acetaminophen, 650 mg, Q6H PRN  promethazine, 25 mg, Q6H PRN      ALLERGIES:    Allergies   Allergen Reactions    Nsaids Nausea Only and Other (See Comments)     Hx of Barretts esophagus      Morphine Hives and Itching     Pt gets Hives/itching. Does not tolerate     Prochlorperazine Other (See Comments)     No allergic reaction, patient reported sense of \"restlessness\" and fidgiting    Valtrex [Valacyclovir Hcl] Diarrhea    Fentanyl Itching    Morphine And Related Hives and Itching    Tolmetin Nausea Only     Hx of Barretts esophagus         PHYSICAL EXAM   VITALS:  /74   Pulse (!) 44   Temp 98.3 °F (36.8 °C) (Oral)   Resp 18   Ht 6' (1.829 m)   Wt 202 lb 9.6 oz (91.9 kg)   SpO2 93%   BMI 27.48 kg/m²   TEMPERATURE:  Current - Temp: 98.3 °F (36.8 °C);  Max - Temp Av.3 °F (36.8 °C)  Min: 97.8 °F (36.6 °C)  Max: 98.6 °F (37 °C)    Physical Exam:  General appearance: alert, cooperative, no distress, appears stated age  Eyes: Anicteric  Head: Normocephalic, without obvious abnormality  Lungs: clear to auscultation bilaterally, Normal Effort  Heart: regular rate and rhythm, normal S1 and S2, no murmurs or rubs  Abdomen: soft, non-distended, non-tender. Bowel sounds normal.   Extremities: atraumatic, no cyanosis or edema  Skin: warm and dry, no jaundice  Neuro: Grossly intact, A&OX3    LABS AND IMAGING   Laboratory   Recent Labs     22  1418 22  0532   WBC 5.3 6.4   HGB 13.1* 12.0*   HCT 39.0* 36.9*   MCV 89.5 89.9    287     Recent Labs     22  0532 22  0530 22  0510    140 141   K 4.2 4.2 4.0    107 105   CO2 21 23 23   BUN 6* 11 9   CREATININE 0.9 0.9 0.9     Recent Labs     22  1418   AST 14*   ALT 10   BILIDIR <0.2   BILITOT 0.5   ALKPHOS 110     Recent Labs     22  1418   LIPASE 17.0     No results for input(s): PROTIME, INR in the last 72 hours. Imaging  CT ABDOMEN PELVIS W IV CONTRAST Additional Contrast? None   Final Result   1. Status post splenectomy and left nephrectomy. 2. Cholelithiasis but no acute cholecystitis. 3. Status post gastric surgery with no obvious complications. 4. Diverticulosis but no acute diverticulitis. 5. Some free fluid in the pelvis and a calculus which measures 2.2 cm also   previously seen and appears unchanged. 6. Chronic L1 severe compression injury. Endoscopy  EGD 3/24/2022 with Dr. Les Hewitt  Esophagus: The esophagus appeared normal without evidence of Vaughn's esophagus or reflux esophagitis. Stomach: The stomach had evidence of tasha-en-y gastric bypass. A small 2-3 mm non-bleeding anastomotic ulcer was present. No bleeding present. No stricture present. The gastric pouch otherwise appeared normal on forward and retroflexed views.    Jejunum:  The afferent and efferent limbs appeared normal with normal villous pattern      ASSESSMENT AND RECOMMENDATIONS   Agnes Vega is a 49 y.o. male with PMH of Obesity s/p Gastric Bypass (2009), Renal Cancer s/p nephrectomy (2018), splenic abscess s/p splenectomy, recurrent SBO, GERD, OSCAR, bradycardia s/p loop recorder implant and marijuana use who presented on 3/22/2022 with intractable n/v, epigastric pain    1. Acute recurrent nausea, vomiting, upper abdominal pain. Differentials include cyclic vomiting syndrome related to his marijuana use, acute viral gastroenteritis, PUD/GERD. CT showed no acute findings, no evidence of bowel obstruction. Lipase and LFTs normal.  EGD yesterday showed small nonbleeding anastomotic ulcer. He is feeling better today. Tolerating liquids     2. Asymptomatic bradycardia. Seen by EP. RECOMMENDATIONS:    Advance diet   Continue PPI twice daily and Carafate 1 g 4 times daily  Recommend avoiding marijuana given concern for cannabinoid induced hyperemesis  Okay from GI standpoint for discharge  Can follow-up in the office as needed    If you have any questions or need any further information, please feel free to contact us [phone]. Thank you for allowing us to participate in the care of Agnes Vega. The note was completed using Dragon voice recognition transcription. Every effort was made to ensure accuracy; however, inadvertent transcription errors may be present despite my best efforts to edit errors. Narendra JOAQUIN     Attending physician addendum:    I have personally seen and examined the patient, reviewed the patient's medical record and pertinent labs and clinical imaging. I have personally staffed the case with Narendra JOAQUIN. I agree with her consultation note, exam findings, assessment and plans  as written above.   I have made appropriate modifications and edited her assessment and plan where needed to reflect my impression and plans for this patient. Autumn Reis a 49 y. o. male with a PMH of Obesity s/p Gastric Bypass (2009), Renal Cancer s/p nephrectomy (2018), splenic abscess s/p splenectomy, recurrent SBO, GERD, OSCAR, bradycardia s/p loop recorder implant and marijuana use who presented on 3/22/2022 with intractable nausea, vomiting, epigastric pain. Patient has been having worsening symptoms and recently saw Dr. Sacha Wagner in office. CT showed evidence of cholelithiasis but no evidence of cholecystitis. LFT and Lipase normal. EGD show small marginal ulcer. Continue PPI/Carafate. I doubt small ulcer is cause of symptoms. Encouraged patient and his mother (mother highly involved in care) to avoid Marijuana due to concerns Cannabinoid induced hyperemesis. He is feeling better and tolerating diet. OK to DC from GI standpoint. Thank you for allowing me to participate in this patient's care. If there are any questions or concerns regarding this patient, or the plan we have set in place, please feel free to contact me at [phone].      Christiano Rogers MD

## 2022-03-25 NOTE — DISCHARGE INSTR - DIET

## 2022-03-25 NOTE — PROGRESS NOTES
Pt is alert and oriented x4, resting quietly in bed. Per pt he has been tolerating p.o. intake tolerated well. No complaints of pain at this time. Pain meds given prior to start of shift. Standard Fall precautions in place. Call light within reach. no acute needs noted. Will continue to monitor.

## 2022-03-25 NOTE — PROGRESS NOTES
Patient is resting in bed. Alert and oriented X4. Complaining of pain, PRN pain medication given per order (see MAR). IV in place and infusing. Assessment complete. All patient needs are met at this time. Fall precautions are in place. Call light is in reach. Will continue to monitor.    Electronically signed by Harriet Mejia RN on 3/24/2022 at 9:11 PM

## 2022-03-28 ENCOUNTER — CARE COORDINATION (OUTPATIENT)
Dept: CASE MANAGEMENT | Age: 52
End: 2022-03-28

## 2022-03-28 NOTE — CARE COORDINATION
[facility]    Call within 2 business days of discharge: Yes    Patient: Mikael Brown Patient : 1970   MRN: [id number]  Reason for Admission: abdominal pain  Discharge Date: 3/25/22 RARS: Readmission Risk Score: 17.7 ( )      Last Discharge [facility]       Complaint Diagnosis Description Type Department Provider    3/22/22 Abdominal Pain Intractable nausea and vomiting . .. ED to Hosp-Admission (Discharged) (ADMITTED) Carla Abraham MD; Jacob Bonilla. .. Attempted to contact patient for initial follow up transition call. Left voicemail message to return call with an update on condition since discharge. Contact information provided. Will continue to follow up.           Care Transitions 24 Hour Call    Do you have all of your prescriptions and are they filled?: Yes  Post Acute Services: [address] (Comment: [address])  Care Transitions Interventions         Follow Up  Future Appointments   Date Time Provider Timothy Beal   2022 12:15 PM SCHEDULE, [facility]   2022  8:30 AM SCHEDULE, [facility]CHO RO[facility]   2022  4:00 PM Wilbert Garcia MD [facility]   2022  7:45 AM SCHEDULE, [facility]   2022  9:00 AM SCHEDULE, [facility]   2022  8:15 AM SCHEDULE, [facility]   2022  7:45 AM SCHEDULE, [facility]   10/3/2022  7:45 AM SCHEDULE, [facility]   2022  7:45 AM SCHEDULE, [facility]   2022  7:45 AM SCHEDULE, [facility]   3/21/2023 10:00 AM Nilson Whitman MD [facility] D[facility] Millan LPN

## 2022-03-29 ENCOUNTER — CARE COORDINATION (OUTPATIENT)
Dept: CASE MANAGEMENT | Age: 52
End: 2022-03-29

## 2022-03-29 NOTE — CARE COORDINATION
Transitions of Care Initial Call    Was this an external facility discharge? No Discharge Facility: N/A    Challenges to be reviewed by the provider   Additional needs identified to be addressed with provider: No               Method of communication with provider : none          Was this a readmission? No    2nd and final attempt at CT discharge phone call. Unable to reach patient. Left message. Informed [facility] this would be the final CTN outreach. MyChart message sent to patient. Staff message sent to PCP office - requesting need for PCP hospital follow up appointment.     Nadine Sanchez RN BSN  Care Transition Nurse  [phone]

## 2022-04-08 RX ORDER — FLUOXETINE HYDROCHLORIDE 20 MG/1
20 CAPSULE ORAL DAILY
Qty: 90 CAPSULE | Refills: 1 | Status: SHIPPED | OUTPATIENT
Start: 2022-04-08 | End: 2022-06-29 | Stop reason: SDUPTHER

## 2022-04-08 RX ORDER — TRAZODONE HYDROCHLORIDE 100 MG/1
200 TABLET ORAL NIGHTLY
Qty: 180 TABLET | Refills: 1 | Status: SHIPPED | OUTPATIENT
Start: 2022-04-08 | End: 2022-06-29 | Stop reason: SDUPTHER

## 2022-04-08 NOTE — TELEPHONE ENCOUNTER
I checked with the infusion center, they will call him today. They put him on hold because he was having test and was in hospital recently. Will you fill his trazadone and prozac? (Dr Trell Rehman)  Pended both if you do fill them.

## 2022-04-11 ENCOUNTER — NURSE ONLY (OUTPATIENT)
Dept: CARDIOLOGY CLINIC | Age: 52
End: 2022-04-11
Payer: COMMERCIAL

## 2022-04-11 DIAGNOSIS — Z45.09 ENCOUNTER FOR LOOP RECORDER CHECK: ICD-10-CM

## 2022-04-11 DIAGNOSIS — Z86.73 HISTORY OF STROKE: ICD-10-CM

## 2022-04-11 DIAGNOSIS — R55 SYNCOPE, UNSPECIFIED SYNCOPE TYPE: ICD-10-CM

## 2022-04-11 DIAGNOSIS — G45.9 TIA (TRANSIENT ISCHEMIC ATTACK): ICD-10-CM

## 2022-04-12 PROCEDURE — G2066 INTER DEVC REMOTE 30D: HCPCS | Performed by: INTERNAL MEDICINE

## 2022-04-12 PROCEDURE — 93298 REM INTERROG DEV EVAL SCRMS: CPT | Performed by: INTERNAL MEDICINE

## 2022-04-12 NOTE — PROGRESS NOTES
ILR for syncope and CVA. Remote transmission received from patient's monitor at home. Remote Linq report shows 4 Pause and 12 Alexander events w 1 ECG available illustrating true, likely nocturnal, pause. No symptom episodes recorded. EP physician to review. See PACEART report under Cardiology tab. Will continue to monitor remotely.

## 2022-04-13 RX ORDER — PANTOPRAZOLE SODIUM 40 MG/1
TABLET, DELAYED RELEASE ORAL
Qty: 180 TABLET | Refills: 1 | Status: SHIPPED | OUTPATIENT
Start: 2022-04-13 | End: 2022-09-21

## 2022-04-13 RX ORDER — CLOPIDOGREL BISULFATE 75 MG/1
TABLET ORAL
Qty: 90 TABLET | Refills: 2 | Status: SHIPPED | OUTPATIENT
Start: 2022-04-13

## 2022-04-15 ENCOUNTER — HOSPITAL ENCOUNTER (OUTPATIENT)
Dept: INFUSION THERAPY | Age: 52
Setting detail: INFUSION SERIES
Discharge: HOME | End: 2022-04-15
Payer: COMMERCIAL

## 2022-04-15 VITALS
TEMPERATURE: 97.8 F | OXYGEN SATURATION: 98 % | RESPIRATION RATE: 16 BRPM | SYSTOLIC BLOOD PRESSURE: 110 MMHG | HEART RATE: 62 BPM | DIASTOLIC BLOOD PRESSURE: 67 MMHG

## 2022-04-15 DIAGNOSIS — K21.00 GASTROESOPHAGEAL REFLUX DISEASE WITH ESOPHAGITIS, UNSPECIFIED WHETHER HEMORRHAGE: ICD-10-CM

## 2022-04-15 DIAGNOSIS — K22.719 BARRETT'S ESOPHAGUS WITH DYSPLASIA: ICD-10-CM

## 2022-04-15 DIAGNOSIS — M85.89 OSTEOPENIA OF MULTIPLE SITES: Primary | ICD-10-CM

## 2022-04-15 PROCEDURE — 6360000002 HC RX W HCPCS: Performed by: FAMILY MEDICINE

## 2022-04-15 PROCEDURE — 2580000003 HC RX 258: Performed by: FAMILY MEDICINE

## 2022-04-15 PROCEDURE — 96365 THER/PROPH/DIAG IV INF INIT: CPT

## 2022-04-15 RX ORDER — EPINEPHRINE 1 MG/ML
0.3 INJECTION, SOLUTION, CONCENTRATE INTRAVENOUS PRN
Status: CANCELLED | OUTPATIENT
Start: 2022-04-15

## 2022-04-15 RX ORDER — ZOLEDRONIC ACID 5 MG/100ML
5 INJECTION, SOLUTION INTRAVENOUS ONCE
Status: CANCELLED | OUTPATIENT
Start: 2022-04-15 | End: 2022-04-15

## 2022-04-15 RX ORDER — SODIUM CHLORIDE 0.9 % (FLUSH) 0.9 %
5-40 SYRINGE (ML) INJECTION PRN
Status: DISCONTINUED | OUTPATIENT
Start: 2022-04-15 | End: 2022-04-16 | Stop reason: HOSPADM

## 2022-04-15 RX ORDER — SODIUM CHLORIDE 0.9 % (FLUSH) 0.9 %
5-40 SYRINGE (ML) INJECTION PRN
Status: CANCELLED | OUTPATIENT
Start: 2022-04-15

## 2022-04-15 RX ORDER — ONDANSETRON 2 MG/ML
8 INJECTION INTRAMUSCULAR; INTRAVENOUS
Status: CANCELLED | OUTPATIENT
Start: 2022-04-15

## 2022-04-15 RX ORDER — ALBUTEROL SULFATE 90 UG/1
4 AEROSOL, METERED RESPIRATORY (INHALATION) PRN
Status: CANCELLED | OUTPATIENT
Start: 2022-04-15

## 2022-04-15 RX ORDER — DIPHENHYDRAMINE HYDROCHLORIDE 50 MG/ML
50 INJECTION INTRAMUSCULAR; INTRAVENOUS
Status: CANCELLED | OUTPATIENT
Start: 2022-04-15

## 2022-04-15 RX ORDER — ZOLEDRONIC ACID 5 MG/100ML
5 INJECTION, SOLUTION INTRAVENOUS ONCE
Status: COMPLETED | OUTPATIENT
Start: 2022-04-15 | End: 2022-04-15

## 2022-04-15 RX ORDER — ACETAMINOPHEN 325 MG/1
650 TABLET ORAL
Status: CANCELLED | OUTPATIENT
Start: 2022-04-15

## 2022-04-15 RX ORDER — SODIUM CHLORIDE 9 MG/ML
INJECTION, SOLUTION INTRAVENOUS CONTINUOUS
Status: CANCELLED | OUTPATIENT
Start: 2022-04-15

## 2022-04-15 RX ADMIN — SODIUM CHLORIDE, PRESERVATIVE FREE 10 ML: 5 INJECTION INTRAVENOUS at 10:30

## 2022-04-15 RX ADMIN — ZOLEDRONIC ACID 5 MG: 5 INJECTION, SOLUTION INTRAVENOUS at 09:56

## 2022-04-15 RX ADMIN — SODIUM CHLORIDE, PRESERVATIVE FREE 10 ML: 5 INJECTION INTRAVENOUS at 09:48

## 2022-04-15 NOTE — PROGRESS NOTES
Outpatient [facility] 1977    Reclast Infusion    NAME:  Cheko Bartlett OF BIRTH:  1970  MEDICAL RECORD NUMBER:  [id number]  DATE:  4/15/2022    Patient arrived to [facility]   [] per wheelchair   [x] ambulatory     Alert and oriented X 4. Here for first dose of Reclast. Had 3 fractures in one year. Had kyphoplasty for lumbar fracture and wrist fracture. Dexa was positive for osteoporosis. HX of Perez's esophagus and gastric bypass. Denies new s/s of infection or increase in respiratory distress. Afebrile. Wearing face mask to prevent the spread of COVID-19    Is this the patient's first Reclast Infusion? Yes     Date of last Reclast Infusion? . ., N/A. Did the patient experience any adverse reactions to Reclast? NA    Any recent oral or dental surgery? No    Any recent active fever, infections and/or illnesses? No    Patient has history of pathological fracture? Yes lumbar fracture and wrist fracture after fall    Patient has had a recent fracture due to trauma or injury? Yes last year    Patient has had a recent orthopedic surgery or procedure done? No    Approximate date of last Dexa scan? 11/3/21      Patient has had at least 24 oz. of fluids today without caffeine? Yes      /67   Pulse 62   Temp 97.8 °F (36.6 °C) (Oral)   Resp 16   SpO2 98%     Labs   BMP:   Lab Results   Component Value Date     03/25/2022    K 4.0 03/25/2022     03/25/2022    CO2 23 03/25/2022    BUN 9 03/25/2022    LABALBU 4.1 03/22/2022    CREATININE 0.9 03/25/2022    CALCIUM 9.1 03/25/2022    GFRAA >60 03/25/2022    LABGLOM >60 03/25/2022    GLUCOSE 77 03/25/2022    GLUCOSE 84 01/07/2015       Administered Reclast via: [x] Peripheral access    [] PICC access    [] Port access    Reclast 5 mg given IVPB over 34 minutes. Response to treatment:  Well tolerated by patient.   Verbal and written instructions given re medication, possible side effects, when to call MD, and scheduling next dose. Verbalized understanding.      Electronically signed by Jono Hart RN on 4/15/2022 at 10:53 AM    Note routed to Dr. Reshma Devine

## 2022-04-27 ENCOUNTER — HOSPITAL ENCOUNTER (OUTPATIENT)
Dept: NON INVASIVE DIAGNOSTICS | Age: 52
Discharge: HOME | End: 2022-04-27
Payer: COMMERCIAL

## 2022-04-27 DIAGNOSIS — Q21.12 PFO (PATENT FORAMEN OVALE): ICD-10-CM

## 2022-04-27 LAB
LV EF: 58 %
LVEF MODALITY: NORMAL

## 2022-04-27 PROCEDURE — 93306 TTE W/DOPPLER COMPLETE: CPT

## 2022-05-03 NOTE — ADDENDUM NOTE
Encounter addended by: West Carcamo MA on: 5/3/2022 3:28 PM   Actions taken: Document created, Document edited

## 2022-05-04 ENCOUNTER — OFFICE VISIT (OUTPATIENT)
Dept: FAMILY MEDICINE CLINIC | Age: 52
End: 2022-05-04
Payer: COMMERCIAL

## 2022-05-04 VITALS
SYSTOLIC BLOOD PRESSURE: 114 MMHG | HEIGHT: 72 IN | WEIGHT: 207.2 LBS | TEMPERATURE: 98.2 F | BODY MASS INDEX: 28.06 KG/M2 | DIASTOLIC BLOOD PRESSURE: 68 MMHG | HEART RATE: 70 BPM | RESPIRATION RATE: 16 BRPM | OXYGEN SATURATION: 98 %

## 2022-05-04 DIAGNOSIS — H66.001 NON-RECURRENT ACUTE SUPPURATIVE OTITIS MEDIA OF RIGHT EAR WITHOUT SPONTANEOUS RUPTURE OF TYMPANIC MEMBRANE: Primary | ICD-10-CM

## 2022-05-04 DIAGNOSIS — D17.24 LIPOMA OF LEFT LOWER EXTREMITY: ICD-10-CM

## 2022-05-04 PROCEDURE — 99213 OFFICE O/P EST LOW 20 MIN: CPT

## 2022-05-04 RX ORDER — AMOXICILLIN AND CLAVULANATE POTASSIUM 875; 125 MG/1; MG/1
1 TABLET, FILM COATED ORAL 2 TIMES DAILY
Qty: 14 TABLET | Refills: 0 | Status: SHIPPED | OUTPATIENT
Start: 2022-05-04 | End: 2022-05-11

## 2022-05-04 ASSESSMENT — ENCOUNTER SYMPTOMS
COUGH: 0
EYE ITCHING: 0
SHORTNESS OF BREATH: 0
SINUS PRESSURE: 0
EYE REDNESS: 0
FACIAL SWELLING: 0
EYE DISCHARGE: 0
EYE PAIN: 0
ABDOMINAL PAIN: 0
RHINORRHEA: 0
SORE THROAT: 0
SINUS PAIN: 0
CHEST TIGHTNESS: 0

## 2022-05-04 ASSESSMENT — PATIENT HEALTH QUESTIONNAIRE - PHQ9
1. LITTLE INTEREST OR PLEASURE IN DOING THINGS: 0
5. POOR APPETITE OR OVEREATING: 1
SUM OF ALL RESPONSES TO PHQ9 QUESTIONS 1 & 2: 0
SUM OF ALL RESPONSES TO PHQ QUESTIONS 1-9: 1
8. MOVING OR SPEAKING SO SLOWLY THAT OTHER PEOPLE COULD HAVE NOTICED. OR THE OPPOSITE, BEING SO FIGETY OR RESTLESS THAT YOU HAVE BEEN MOVING AROUND A LOT MORE THAN USUAL: 0
SUM OF ALL RESPONSES TO PHQ QUESTIONS 1-9: 1
SUM OF ALL RESPONSES TO PHQ QUESTIONS 1-9: 1
4. FEELING TIRED OR HAVING LITTLE ENERGY: 0
9. THOUGHTS THAT YOU WOULD BE BETTER OFF DEAD, OR OF HURTING YOURSELF: 0
10. IF YOU CHECKED OFF ANY PROBLEMS, HOW DIFFICULT HAVE THESE PROBLEMS MADE IT FOR YOU TO DO YOUR WORK, TAKE CARE OF THINGS AT HOME, OR GET ALONG WITH OTHER PEOPLE: 0
6. FEELING BAD ABOUT YOURSELF - OR THAT YOU ARE A FAILURE OR HAVE LET YOURSELF OR YOUR FAMILY DOWN: 0
7. TROUBLE CONCENTRATING ON THINGS, SUCH AS READING THE NEWSPAPER OR WATCHING TELEVISION: 0
2. FEELING DOWN, DEPRESSED OR HOPELESS: 0
3. TROUBLE FALLING OR STAYING ASLEEP: 0
SUM OF ALL RESPONSES TO PHQ QUESTIONS 1-9: 1

## 2022-05-04 NOTE — PROGRESS NOTES
5/4/2022    This is a 47 y.o. male   Chief Complaint   Patient presents with    Hearing Loss     hearing loss in rt ear. no pain. pressure. had blood on q-tip last night. started last night.  Mass     lump on lt thigh. x1 month. possibly hit on something. no pain. hard marble sized   . HPI  [facility] is a patient of Dr. Alonzo Matthews who is here today for right ear hearing loss. He reports that he got water in ear while in shower last night. Couldn't clear his ear, used a q-tip. Small amount of blood on q-tip, but he did color his hair red, so this could have been redness from the hair dye. Denies fever, sinus pressure/pain, headache, cough. Patient also reports a lump on his left thigh- does not remember bumping into anything. [place] sized. Denies pain. 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  Chronic pain syndrome- abdominal and head    History of stroke: right insular cortex on June 8, 2014 (small PFO; hypergoag w/u neg,  neurology)    Gastroesophageal reflux disease with esophagitis--on daily ppi    Intractable chronic migraine without aura and with status migrainosus    Iron deficiency anemia    B12 deficiency    Malignant neoplasm of left kidney (HCC) clear cell. 8/1/18 Dr Carlos Pope.     History of depression    History of alcoholism ([facility] [address]    History of total knee arthroplasty, right    Hematoma of right knee region    TIA (transient ischemic attack) LUE weakness 3/21    Tobacco use    Acute deep vein thrombosis (DVT) of calf muscle vein of both lower extremities (HCC)    Closed compression fracture of body of L1 vertebra (HCC)    Osteopenia of multiple sites    Small bowel obstruction (HCC)    Contusion of left hip    Current smoker    Intractable nausea and vomiting          Current Outpatient Medications   Medication Sig Dispense Refill    amoxicillin-clavulanate (AUGMENTIN) 875-125 MG per tablet Take 1 tablet by mouth 2 times daily for 7 days 14 tablet 0    clopidogrel (PLAVIX) 75 MG tablet TAKE 1 TABLET BY MOUTH EVERY DAY 90 tablet 2    pantoprazole (PROTONIX) 40 MG tablet TAKE 1 TABLET BY MOUTH TWICE A  tablet 1    FLUoxetine (PROZAC) 20 MG capsule Take 1 capsule by mouth daily 90 capsule 1    traZODone (DESYREL) 100 MG tablet Take 2 tablets by mouth nightly 180 tablet 1    dicyclomine (BENTYL) 10 MG capsule Take 1 capsule by mouth every 6 hours as needed (cramps) 20 capsule 0    ondansetron (ZOFRAN ODT) 4 MG disintegrating tablet Take 1 tablet by mouth every 8 hours as needed for Nausea 20 tablet 0    atorvastatin (LIPITOR) 40 MG tablet TAKE 1 TABLET BY MOUTH EVERYDAY AT BEDTIME 90 tablet 1    gabapentin (NEURONTIN) 600 MG tablet Take 600 mg by mouth 4 times daily.  sildenafil (REVATIO) 20 MG tablet Take 4 tablets by mouth as needed (30 min prior to intercourse) 30 tablet 0    calcium-vitamin D (OSCAL 500/200 D-3) 500-200 MG-UNIT per tablet Take 1 tablet by mouth 2 times daily       Multiple Vitamin TABS Take 1 tablet by mouth daily       sucralfate (CARAFATE) 1 GM tablet Take 1 tablet by mouth 4 times daily 120 tablet 3     No current facility-administered medications for this visit. Allergies   Allergen Reactions    Nsaids Nausea Only and Other (See Comments)     Hx of Barretts esophagus      Morphine Hives and Itching     Pt gets Hives/itching.  Does not tolerate     Prochlorperazine Other (See Comments)     No allergic reaction, patient reported sense of \"restlessness\" and fidgiting    Valtrex [Valacyclovir Hcl] Diarrhea    Fentanyl Itching    Morphine And Related Hives and Itching    Tolmetin Nausea Only     Hx of Barretts esophagus       Review of Systems   Constitutional: Negative for activity change and fever. HENT: Positive for ear discharge, ear pain and hearing loss. Negative for congestion, facial swelling, postnasal drip, rhinorrhea, sinus pressure, sinus pain, sore throat and tinnitus. Eyes: Negative for pain, discharge, redness and itching. Respiratory: Negative for cough, chest tightness and shortness of breath. Cardiovascular: Negative for chest pain, palpitations and leg swelling. Gastrointestinal: Negative for abdominal pain. Neurological: Negative for dizziness and headaches. Vitals:    05/04/22 1350   BP: 114/68   Site: Right Upper Arm   Position: Sitting   Cuff Size: Large Adult   Pulse: 70   Resp: 16   Temp: 98.2 °F (36.8 °C)   TempSrc: Oral   SpO2: 98%   Weight: 207 lb 3.2 oz (94 kg)   Height: 6' (1.829 m)       Body mass index is 28.1 kg/m². Wt Readings from Last 3 Encounters:   05/04/22 207 lb 3.2 oz (94 kg)   03/25/22 202 lb 9.6 oz (91.9 kg)   03/07/22 213 lb 10 oz (96.9 kg)       BP Readings from Last 3 Encounters:   05/04/22 114/68   04/15/22 110/67   03/25/22 122/74       Physical Exam  Vitals and nursing note reviewed. Constitutional:       General: He is not in acute distress. Appearance: Normal appearance. HENT:      Head: Normocephalic and atraumatic. Right Ear: Decreased hearing noted. Drainage and tenderness present. A middle ear effusion is present. There is no impacted cerumen. Tympanic membrane is not perforated. Left Ear: No decreased hearing noted. No drainage or tenderness. No middle ear effusion. There is no impacted cerumen. Tympanic membrane is not perforated. Cardiovascular:      Rate and Rhythm: Normal rate and regular rhythm. Heart sounds: Normal heart sounds. No murmur heard. No friction rub. No gallop. Pulmonary:      Effort: Pulmonary effort is normal. No respiratory distress.       Breath sounds: Normal breath sounds. Musculoskeletal:         General: Normal range of motion. Skin:     General: Skin is warm and dry. Comments: 1cm x 1cm non-tender, mobile mass to left upper thigh. Neurological:      Mental Status: He is oriented to person, place, and time. Psychiatric:         Behavior: Behavior normal.         Thought Content: Thought content normal.         Judgment: Judgment normal.         Assessmentand Plan  [facility] was seen today for hearing loss and mass. Diagnoses and all orders for this visit:    Non-recurrent acute suppurative otitis media of right ear without spontaneous rupture of tympanic membrane  Right tympanic membrane with opaficiation, not perforated. Small amount of bloody drainage noted in canal.  -     amoxicillin-clavulanate (AUGMENTIN) 875-125 MG per tablet; Take 1 tablet by mouth 2 times daily for 7 days    Lipoma of left lower extremity  Small, non-tender, mobile mass  Observe at this time- watch for growth, increased pain      Return if symptoms worsen or fail to improve.

## 2022-05-05 NOTE — PROGRESS NOTES
[facility]  Cardiology Consult    Jimmy Burch  1970    May 6, 2022      Reason for Referral: PFO    CC: \"I am doing well. \"      Subjective:     History of Present Illness:    Jimmy Burch is a 47 y.o. patient with a PMH significant for nicotine addiction and CVA (2014). He was seen by his primary care provider 3/10/2021 after and episode of left hand weakness. An outpatient MRI and carotid doppler we ordered along with a referral to cardiology. He was evaluated in the emergency room on 3/28/10663 with leg pain nad was found to have a DVT and started on Xarelto. Today, he is here for follow up. He states that he is doing well. He denies any new stroke like symptoms. He continues Plavix but it is on hold right now due to upcoming colonoscopy. Patient denies exertional chest pain/pressure, dyspnea at rest, SALAZAR, PND, orthopnea, palpitations, lightheadedness, weight changes, changes in LE edema, and syncope. Patient reports compliance to his medications. Past Medical History:   has a past medical history of Alcoholism ([address] Allergic migraine with status migrainosus, Allergic rhinitis, seasonal, Anemia, Arthritis, Vaughn's esophagus, Benign intracranial hypertension, Cancer ([address] Carpal tunnel syndrome, Chronic pain, Depression, Ear infection, GERD (gastroesophageal reflux disease), Headache(784.0), History of blood transfusion, History of tobacco use, Migraine, Morbid obesity ([address] Movement disorder, Onychomycosis, Sleep apnea, obstructive, Unspecified cerebral artery occlusion with cerebral infarction, Use of cane as ambulatory aid, Wears dentures, and Wears glasses. Surgical History:   has a past surgical history that includes Gastric bypass surgery (Jan 2009); Splenectomy; LASIK (2005); knee surgery (July 2012); Carpal tunnel release; laparotomy; Knee arthroscopy (Right, 7/11/2013); Knee arthroscopy (Right, 7/11/12); Total knee arthroplasty (10/15/13);  Endoscopy, colon, diagnostic; Dilatation, esophagus; eye surgery; joint replacement; Abdominal exploration surgery (1/11/16); other surgical history (Left, 03/08/2016); hernia repair (3-); Upper gastrointestinal endoscopy (6-7-2016); other surgical history (2016); Upper gastrointestinal endoscopy (04/02/2018); Kidney removal (Left, 08/01/2018); Abdomen surgery; Abdomen surgery (4-7-2016); Revision total knee arthroplasty (Right, 12/17/2019); Revision total knee arthroplasty (Right, 1/22/2020); knee surgery (Right, 2/18/2020); Total knee arthroplasty (Right, 8/12/2020); Revision total knee arthroplasty (Right, 2/16/2021); knee surgery (Right, 2/26/2021); knee surgery (Right, 3/5/2021); and Upper gastrointestinal endoscopy (N/A, 3/24/2022). Social History:   reports that he quit smoking about a year ago. His smoking use included cigarettes. He started smoking about 37 years ago. He has a 12.50 pack-year smoking history. He has never used smokeless tobacco. He reports current drug use. Drug: Marijuana Jerelyn November). He reports that he does not drink alcohol. Family History:  family history includes Arthritis in his mother; Cancer in his father; Diabetes in his maternal uncle; Heart Disease in his maternal uncle and maternal uncle. Home Medications:  Were reviewed and are listed in nursing record and/or below  Prior to Admission medications    Medication Sig Start Date End Date Taking?  Authorizing Provider   amoxicillin-clavulanate (AUGMENTIN) 875-125 MG per tablet Take 1 tablet by mouth 2 times daily for 7 days 5/4/22 5/11/22 Yes LILLIE Osorio - CNP   clopidogrel (PLAVIX) 75 MG tablet TAKE 1 TABLET BY MOUTH EVERY DAY 4/13/22  Yes Edenilson Lynne MD   pantoprazole (PROTONIX) 40 MG tablet TAKE 1 TABLET BY MOUTH TWICE A DAY 4/13/22  Yes Morro Salmeron MD   FLUoxetine [facility]) 20 MG capsule Take 1 capsule by mouth daily 4/8/22  Yes Morro Salmeron MD   traZODone (DESYREL) 100 MG tablet Take 2 tablets by mouth nightly 4/8/22 Yes Hui Geiger MD   ondansetron (ZOFRAN ODT) 4 MG disintegrating tablet Take 1 tablet by mouth every 8 hours as needed for Nausea 3/7/22  Yes Дмитрий Fountain MD   atorvastatin (LIPITOR) 40 MG tablet TAKE 1 TABLET BY MOUTH EVERYDAY AT BEDTIME 1/10/22  Yes Hui Geiger MD   gabapentin (NEURONTIN) 600 MG tablet Take 600 mg by mouth 4 times daily. 8/2/21  Yes Historical Provider, MD   sildenafil (REVATIO) 20 MG tablet Take 4 tablets by mouth as needed (30 min prior to intercourse) 1/11/21  Yes Hui Geiger MD   calcium-vitamin D (OSCAL 500/200 D-3) 500-200 MG-UNIT per tablet Take 1 tablet by mouth 2 times daily    Yes Historical Provider, MD   Multiple Vitamin TABS Take 1 tablet by mouth daily    Yes Historical Provider, MD        CURRENT Medications:  No current facility-administered medications for this visit. Allergies:  Nsaids, Morphine, Prochlorperazine, Valtrex [valacyclovir hcl], Fentanyl, Morphine and related, and Tolmetin           Review of Systems: SEE HPI   · Constitutional: no unanticipated weight loss. There's been no change in energy level, sleep pattern, or activity level. No fevers, chills. · Eyes: No visual changes or diplopia. No scleral icterus. · ENT: No Headaches, hearing loss or vertigo. No mouth sores or sore throat. · Cardiovascular: No Chest pain, tightness or discomfort.  No Shortness of breath. No Dyspnea on exertion, Orthopnea, Paroxysmal nocturnal dyspnea or breathlessness at rest.   No Palpitations.  No Syncope ('blackouts', 'faints', 'collapse') or dizziness. · Respiratory: No cough or wheezing, no sputum production. No hematemesis. · Gastrointestinal: No abdominal pain, appetite loss, blood in stools. No change in bowel or bladder habits. · Genitourinary: No dysuria, trouble voiding, or hematuria. · Musculoskeletal:  No gait disturbance, no joint complaints. · Integumentary: No rash or pruritis.   · Neurological: No headache, diplopia, change in muscle strength, numbness or tingling. · Psychiatric: No anxiety or depression. · Endocrine: No temperature intolerance. No excessive thirst, fluid intake, or urination. No tremor. · Hematologic/Lymphatic: No abnormal bruising or bleeding, blood clots or swollen lymph nodes. · Allergic/Immunologic: No nasal congestion or hives. Objective:     PHYSICAL EXAM:      Vitals:    05/06/22 1607   BP: 100/70   Pulse: 52   SpO2: 98%    Weight: 206 lb 6.4 oz (93.6 kg)       General Appearance:  Alert, cooperative, no distress, appears stated age. Head:  Normocephalic, without obvious abnormality, atraumatic. Eyes:  Pupils equal and round. No scleral icterus. Mouth: Moist mucosa, no pharyngeal erythema. Nose: Nares normal. No drainage or sinus tenderness. Neck: Supple, symmetrical, trachea midline. No adenopathy. No tenderness/mass/nodules. No carotid bruit or elevated JVD. Lungs:   Respiratory Effort: Normal   Auscultation: Clear to auscultation bilaterally, respirations unlabored. No wheeze, rales   Chest Wall:  No tenderness or deformity. Cardiovascular:    Pulses  Palpation: normal   Ascultation: Regular rate, S1/ S2 normal. No murmur, rub, or gallop. 2+ radial and pedal pulses, symmetric  Carotid  Femoral   Abdomen and Gastrointestinal:   Soft, non-tender, bowel sounds active. Liver and Spleen  Masses   Musculoskeletal: No muscle wasting  Back  Gait   Extremities: Extremities normal, atraumatic. No cyanosis or edema. No cyanosis clubbing       Skin: Inspection and palpation performed, no rashes or lesions. Pysch: Normal mood and affect.  Alert and oriented to time place person   Neurologic: Normal gross motor and sensory exam.       Labs     All labs have been reviewed    Lab Results   Component Value Date    WBC 6.4 03/23/2022    RBC 4.10 03/23/2022    RBC 4.94 01/07/2015    HGB 12.0 03/23/2022    HCT 36.9 03/23/2022    MCV 89.9 03/23/2022    RDW 15.8 03/23/2022     03/23/2022     Lab Results   Component Value Date     03/25/2022    K 4.0 03/25/2022     03/25/2022    CO2 23 03/25/2022    BUN 9 03/25/2022    CREATININE 0.9 03/25/2022    GFRAA >60 03/25/2022    AGRATIO 2.1 03/07/2022    LABGLOM >60 03/25/2022    GLUCOSE 77 03/25/2022    GLUCOSE 84 01/07/2015    PROT 6.6 03/22/2022    PROT 6.7 01/07/2015    CALCIUM 9.1 03/25/2022    BILITOT 0.5 03/22/2022    ALKPHOS 110 03/22/2022    AST 14 03/22/2022    ALT 10 03/22/2022     No results found for: Flite [facility]  Lab Results   Component Value Date    LABA1C 5.1 02/09/2021     Lab Results   Component Value Date    CKTOTAL 56 09/07/2020    CKMB 0.9 06/18/2014    TROPONINI <0.01 03/23/2022       Cardiac, Vascular and Imaging Data all Personally Reviewed in Detail by Myself      EKG:     Echocardiogram:   ECHO 4/27/2021  Ejection fraction is visually estimated to be 55-60%. Normal diastolic function. Moderately dilated right ventricle. Right ventricular systolic function is normal .  A bubble study was performed and shows evidence of right to left shunting  consistent with a patent foramen ovale or atrial septal defect. ECHO 8/5/2021  Normal left ventricle size, wall thickness, and systolic function with an  estimated ejection fraction of 55%. No regional wall motion abnormalities  are seen. A bubble study was performed and fails to show evidence of right to left  shunting. A 25 mm Amplatzer PFO occluder device was seen and appears well seated  between the right and left atria. No evidence of any pericardial effusion. Stress Test:     Cath: Other imaging:   MRI Brain 4/1/2021  1. No acute infarct or acute intracranial process identified. 2. Remote infarct involving the posterior right insular cortex and right   parietal lobe, unchanged.      Carotid doppler 4/1/2021  Duplex sonography with color flow enhancement was performed bilaterally on    the cervical carotid system.    No evidence of hemodynamically significant stenosis in the bilateral carotid    and vertebral arteries.    Elevated velocities in the bilateral common carotid arteries with normal    waveforms being present; likely normal variant     30 day event monitor 3/30/2021  Baseline sinus rhythm     KRYSTAL 6/29/2021  LV Function is normal  Mitral and aortic valve appear normal  A bubble study was performed and shows evidence of right to left shunting  consistent with a patent foramen ovale or atrial septal defect. Assessment and Plan     Syncope  S/p ILR. CVA/TIA multiple episodes. Remote infarct involving the posterior right insular cortex and right parietal lobe. 30 day monitor did not show any arrhythmia. No recurrence of symptoms. PFO  S/p PFO closure 8/5/2021. Continue Plavix and statin. Will need prophylactic antibiotics for at least 1 year. DVT   Continue Xarelto. Nicotine addiction  Encouraged smoking cessation. Follow up as needed    Thank you for allowing us to participate in the care of Gordon Barrera. Please do not hesitate to contact me if you have any questions. Kiana Lucero MD, MPH    [facility], [address] [place] [address] [address] [place] [address]  Ph: [phone]  Fax: [phone]      This note was scribed in the presence of Dr Vania Mayers, by Baron Diaz RN    Physician Attestation:  The scribes documentation has been prepared under my direction and personally reviewed by me in its entirety. I confirm that the note above accurately reflects all work, treatment, procedures, and medical decision making performed by me.

## 2022-05-06 ENCOUNTER — OFFICE VISIT (OUTPATIENT)
Dept: CARDIOLOGY CLINIC | Age: 52
End: 2022-05-06
Payer: COMMERCIAL

## 2022-05-06 VITALS
HEART RATE: 52 BPM | SYSTOLIC BLOOD PRESSURE: 100 MMHG | HEIGHT: 72 IN | BODY MASS INDEX: 27.96 KG/M2 | OXYGEN SATURATION: 98 % | WEIGHT: 206.4 LBS | DIASTOLIC BLOOD PRESSURE: 70 MMHG

## 2022-05-06 DIAGNOSIS — Q21.12 PFO (PATENT FORAMEN OVALE): Primary | ICD-10-CM

## 2022-05-06 PROCEDURE — 99213 OFFICE O/P EST LOW 20 MIN: CPT | Performed by: INTERNAL MEDICINE

## 2022-05-06 NOTE — PATIENT INSTRUCTIONS
Patient Education        Stopping Smoking: Care Instructions  Your Care Instructions     Cigarette smokers crave the nicotine in cigarettes. Giving it up is much harder than simply changing a habit. Your body has to stop craving the nicotine. It is hard to quit, but you can do it. There are many tools that people use to quitsmoking. You may find that combining tools works best for you. There are several steps to quitting. First you get ready to quit. Then you get support to help you. After that, you learn new skills and behaviors to become anonsmoker. For many people, a necessary step is getting and using medicine. Your doctor will help you set up the plan that best meets your needs. You may want to attend a smoking cessation program to help you quit smoking. When you choose a program, look for one that has proven success. Ask your doctor for ideas. You will greatly increase your chances of success if you take medicineas well as get counseling or join a cessation program.  Some of the changes you feel when you first quit tobacco are uncomfortable. Your body will miss the nicotine at first, and you may feel short-tempered and grumpy. You may have trouble sleeping or concentrating. Medicine can help you deal with these symptoms. You may struggle with changing your smoking habits and rituals. The last step is the tricky one: Be prepared for the smoking urge to continue for a time. This is a lot to deal with, but keep at it. You willfeel better. Follow-up care is a key part of your treatment and safety. Be sure to make and go to all appointments, and call your doctor if you are having problems. It's also a good idea to know your test results and keep alist of the medicines you take. How can you care for yourself at home?  Ask your family, friends, and coworkers for support. You have a better chance of quitting if you have help and support.    Join a support group, such as Nicotine Anonymous, for people who are trying to quit smoking.  Consider signing up for a smoking cessation program, such as the American Lung Association's Freedom from Smoking program.   Get text messaging support. Go to the website at www.smokefree. gov to sign up for the [facility] program.   Set a quit date. Pick your date carefully so that it is not right in the middle of a big deadline or stressful time. Once you quit, do not even take a puff. Get rid of all ashtrays and lighters after your last cigarette. Clean your house and your clothes so that they do not smell of smoke.  Learn how to be a nonsmoker. Think about ways you can avoid those things that make you reach for a cigarette. ? Avoid situations that put you at greatest risk for smoking. For some people, it is hard to have a drink with friends without smoking. For others, they might skip a coffee break with coworkers who smoke. ? Change your daily routine. Take a different route to work or eat a meal in a different place.  Cut down on stress. Calm yourself or release tension by doing an activity you enjoy, such as reading a book, taking a hot bath, or gardening.  Talk to your doctor or pharmacist about nicotine replacement therapy, which replaces the nicotine in your body. You still get nicotine but you do not use tobacco. Nicotine replacement products help you slowly reduce the amount of nicotine you need. These products come in several forms, many of them available over-the-counter:  ? Nicotine patches  ? Nicotine gum and lozenges  ? Nicotine inhaler   Ask your doctor about bupropion (Wellbutrin) or varenicline (Chantix), which are prescription medicines. They do not contain nicotine. They help you by reducing withdrawal symptoms, such as stress and anxiety.  Some people find hypnosis, acupuncture, and massage helpful for ending the smoking habit.  Eat a healthy diet and get regular exercise.  Having healthy habits will help your body move past its craving for nicotine.  Be prepared to keep trying. Most people are not successful the first few times they try to quit. Do not get mad at yourself if you smoke again. Make a list of things you learned and think about when you want to try again, such as next week, next month, or next year. Where can you learn more? Go to https://[employer]in[employer].Blendspace. org and sign in to your [employer] account. Enter R241 in the Revert.IO box to learn more about \"Stopping Smoking: Care Instructions. \"     If you do not have an account, please click on the \"Sign Up Now\" link. Current as of: October 28, 2021               Content Version: 13.2  © 2006-2022 Healthwise, Incorporated. Care instructions adapted under license by [facility] ([facility]). If you have questions about a medical condition or this instruction, always ask your healthcare professional. [address] any warranty or liability for your use of this information.

## 2022-05-10 ENCOUNTER — TELEPHONE (OUTPATIENT)
Dept: CARDIOLOGY CLINIC | Age: 52
End: 2022-05-10

## 2022-05-10 NOTE — TELEPHONE ENCOUNTER
Received fax from [facility] for cardiac clearance for colonoscopy on 05/11/2022. Fax scanned into chart. Last seen 05/06/2022 - PMH significant for nicotine addiction and CVA (2014).

## 2022-05-10 NOTE — TELEPHONE ENCOUNTER
Discussed with Dr Diego Smith and shun to proceed with procedure. Please prepare letter and make patient aware.

## 2022-05-11 ENCOUNTER — HOSPITAL ENCOUNTER (OUTPATIENT)
Dept: GENERAL RADIOLOGY | Age: 52
Discharge: HOME | End: 2022-05-11
Payer: COMMERCIAL

## 2022-05-11 ENCOUNTER — HOSPITAL ENCOUNTER (OUTPATIENT)
Age: 52
Discharge: HOME | End: 2022-05-11
Payer: COMMERCIAL

## 2022-05-11 DIAGNOSIS — M80.08XA AGE-REL OSTEOPOR W CURRENT PATH FRACTURE, VERTEBRA(E), INIT (HCC): ICD-10-CM

## 2022-05-11 PROCEDURE — 72100 X-RAY EXAM L-S SPINE 2/3 VWS: CPT

## 2022-05-16 ENCOUNTER — NURSE ONLY (OUTPATIENT)
Dept: CARDIOLOGY CLINIC | Age: 52
End: 2022-05-16
Payer: COMMERCIAL

## 2022-05-16 DIAGNOSIS — G45.9 TIA (TRANSIENT ISCHEMIC ATTACK): ICD-10-CM

## 2022-05-16 DIAGNOSIS — Z45.09 ENCOUNTER FOR LOOP RECORDER CHECK: ICD-10-CM

## 2022-05-16 DIAGNOSIS — R55 SYNCOPE AND COLLAPSE: ICD-10-CM

## 2022-05-16 PROCEDURE — 93298 REM INTERROG DEV EVAL SCRMS: CPT | Performed by: INTERNAL MEDICINE

## 2022-05-16 PROCEDURE — G2066 INTER DEVC REMOTE 30D: HCPCS | Performed by: INTERNAL MEDICINE

## 2022-05-16 NOTE — PROGRESS NOTES
ILR for syncope and CVA. Remote transmission received from patient's monitor at home. Remote Linq report shows no arrhythmias/ or events. EP physician to review. See PACEART report under Cardiology tab. Will continue to monitor remotely.

## 2022-05-17 ENCOUNTER — TELEMEDICINE (OUTPATIENT)
Dept: FAMILY MEDICINE CLINIC | Age: 52
End: 2022-05-17
Payer: COMMERCIAL

## 2022-05-17 DIAGNOSIS — U07.1 COVID-19 VIRUS INFECTION: Primary | ICD-10-CM

## 2022-05-17 PROCEDURE — 99213 OFFICE O/P EST LOW 20 MIN: CPT | Performed by: FAMILY MEDICINE

## 2022-05-17 NOTE — PROGRESS NOTES
2022    TELEHEALTH EVALUATION -- Audio/Visual (During [facility]- public health emergency)    HPI:    Blanca Johnson (:  1970) has requested an audio/video evaluation for the following concern(s):    Chief Complaint   Patient presents with    Congestion     sinus drainage. fever. body aches, chills. fatigue. diarrhea, started yesterday. did home covid test and was positive. Virtual video visit for for acute resp illness. Onset 24 hrs ago  Achy, congested. Worsened overnight.  + diarrhea, no vomiting. Did home covid test and was + today. Has had 3 covid vaccines (Pfizer)    mild cough, but no wheeze, SOB. Able to maintain hydration. No ill contacts known. He has no idea where he contracted this. 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 rx used: tylenol. 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  Chronic pain syndrome- abdominal and head    History of stroke: right insular cortex on 2014 (small PFO; hypergoag w/u neg,  neurology)    Gastroesophageal reflux disease with esophagitis--on daily ppi    Intractable chronic migraine without aura and with status migrainosus    Iron deficiency anemia    B12 deficiency    Malignant neoplasm of left kidney (HCC) clear cell. 18 Dr Venus Augustin.     History of depression    History of alcoholism ([facility] [address]    History of total knee arthroplasty, right    Hematoma of right knee region    TIA (transient ischemic attack) LUE weakness 3/21    Tobacco use    Acute deep vein thrombosis (DVT) of calf muscle vein of both lower extremities (HCC)    Closed compression fracture of body of L1 vertebra (HCC)    Osteopenia of multiple sites    Small bowel obstruction (HCC)    Contusion of left hip    Current smoker    Intractable nausea and vomiting        Prior to Visit Medications    Medication Sig Taking? Authorizing Provider   clopidogrel (PLAVIX) 75 MG tablet TAKE 1 TABLET BY MOUTH EVERY DAY Yes Ed Carney MD   pantoprazole (PROTONIX) 40 MG tablet TAKE 1 TABLET BY MOUTH TWICE A DAY Yes Chrystal Carlton MD   FLUoxetine (PROZAC) 20 MG capsule Take 1 capsule by mouth daily Yes Chrystal Carlton MD   traZODone (DESYREL) 100 MG tablet Take 2 tablets by mouth nightly Yes Chrystal Carlton MD   ondansetron (ZOFRAN ODT) 4 MG disintegrating tablet Take 1 tablet by mouth every 8 hours as needed for Nausea Yes Chitra Tamayo MD   atorvastatin (LIPITOR) 40 MG tablet TAKE 1 TABLET BY MOUTH EVERYDAY AT BEDTIME Yes Chrystal Carlton MD   gabapentin (NEURONTIN) 600 MG tablet Take 600 mg by mouth 4 times daily.   Yes Historical Provider, MD   sildenafil (REVATIO) 20 MG tablet Take 4 tablets by mouth as needed (30 min prior to intercourse) Yes Chrystal Carlton MD   calcium-vitamin D (OSCAL 500/200 D-3) 500-200 MG-UNIT per tablet Take 1 tablet by mouth 2 times daily  Yes Historical Provider, MD   Multiple Vitamin TABS Take 1 tablet by mouth daily  Yes Historical Provider, MD       Social History     Tobacco Use    Smoking status: Former Smoker     Packs/day: 0.50     Years: 25.00     Pack years: 12.50     Types: Cigarettes     Start date: 1985     Quit date: 2021     Years since quittin.0    Smokeless tobacco: Never Used    Tobacco comment: Patient vapes   Vaping Use    Vaping Use: Former    Substances: Flavoring    Devices: RefOscarble tank   Substance Use Topics    Alcohol use: No     Alcohol/week: 0.0 standard drinks     Comment: last drink     Drug use: Yes     Types: Marijuana Debra Tai)     Comment: medical marshayy card            PHYSICAL EXAMINATION:  Physical Exam  Constitutional:       General: He is not in acute distress. Appearance: Normal appearance. He is not ill-appearing. HENT:      Head: Normocephalic and atraumatic. Pulmonary:      Effort: Pulmonary effort is normal.   Musculoskeletal:         General: Normal range of motion. Cervical back: Normal range of motion. Skin:     Findings: No rash. Neurological:      General: No focal deficit present. Mental Status: He is alert and oriented to person, place, and time. Mental status is at baseline. Psychiatric:         Mood and Affect: Mood normal.         Behavior: Behavior normal.         Thought Content: Thought content normal.         Judgment: Judgment normal.          ASSESSMENT/PLAN:    1. COVID-19 virus infection  - start paxilovid (normal renal fxn). Urged hydration. OK to continue tylenol. Report any worsening or new symptoms. discussed current isolation guidelines. 5 days, may leave home if needed with mask days 6-10. Return if symptoms worsen or fail to improve. Nikki Elizabeth was evaluated through a synchronous (real-time) audio-video encounter. The patient (or guardian if applicable) is aware that this is a billable service, which includes applicable co-pays. This Virtual Visit was conducted with patient's (and/or legal guardian's) consent. The visit was conducted pursuant to the emergency declaration under the [address] [place] [address], [address] [place] [address] authority and the [employer] and [employer] General Act. Patient identification was verified, and a caregiver was present when appropriate. The patient was located in a state where the provider was licensed to provide care.       Patient identification was verified at the start of the visit: Yes    Services were provided through a video synchronous discussion virtually to substitute for in-person clinic visit. Patient and provider were located at their individual homes. --Epifanio Hawthorne MD on 5/17/2022 at 12:15 PM    An electronic signature was used to authenticate this note.

## 2022-05-20 NOTE — PROGRESS NOTES
* Admitted with diarrhea? [] YES    [x]  NO     *Prior history of C-Diff. In last 3 months? [] YES    []  NO     *Antibiotic use in the past 6-8 weeks? []  NO    []  YES      If yes, which: REASON_Antiviral for COVID. Positive test 5/17/2022________________     *Prior hospitalization or nursing home in the last month? []  YES    [x]  NO     SAFETY FIRST. .call before you fall    [address] [facility] [address] time__1100__________        Surgery time__1230__________    Do not eat or drink anything after 12:00 midnight prior to your surgery. This includes water chewing gum, mints and ice chips- the Day of Surgery. You may brush your teeth and gargle the morning of your surgery, but do not swallow the water     Please see your family doctor/pediatrician for a history and physical and/or questions concerning medications. Bring any test results/reports from your physicians office. If you are under the care of a heart doctor or specialist doctor, please be aware that you may be asked to them for clearance    You may be asked to stop blood thinners such as Coumadin, Plavix, Fragmin, Lovenox, etc., or any anti-inflammatories such as:  Aspirin, Ibuprofen, Advil, Naproxen prior to your surgery. We also ask that you stop any OTC medications such as fish oil, vitamin E, glucosamine, garlic, Multivitamins, COQ 10, etc.    We ask that you do not smoke 24 hours prior to surgery  We ask that you do not  drink any alcoholic beverages 24 hours prior to surgery     You must make arrangements for a responsible adult to take you home after your surgery. For your safety you will not be allowed to leave alone or drive yourself home. Your surgery will be cancelled if you do not have a ride home. Also for your safety, it is strongly suggested that someone stay with you the first 24 hours after your surgery.      A parent or legal guardian must accompany a child scheduled for surgery and plan to stay at the hospital until the child is discharged. Please do not bring other children with you. For your comfort, please wear simple loose fitting clothing to the hospital.  Please do not bring valuables. Do not wear any make-up or nail polish on your fingers or toes. For your safety, please do not wear any jewelry or body piercing's on the day of surgery. All jewelry must be removed. If you have dentures, they will be removed before going to operating room. For your convenience, we will provide you with a container. If you wear contact lenses or glasses, they will be removed, please bring a case for them. If you have a living will and a durable power of  for healthcare, please bring in a copy. As part of our patient safety program to minimize surgical site infections, we ask you to do the following:    · Please notify your surgeon if you develop any illness between         now and the day of your surgery. · This includes a cough, cold, fever, sore throat, nausea,         or vomiting, and diarrhea, etc.  ·  Please notify your surgeon if you experience dizziness, shortness         of breath or blurred vision between now and the time of your surgery. Do not shave your operative site 96 hours prior to surgery. For face and neck surgery, men may use an electric razor 48 hours   prior to surgery. You may shower the night before surgery or the morning of   your surgery with an antibacterial soap. You will need to bring a photo ID and insurance card     If you use a C-pap or Bi-pap machine, please bring your machine with you to the hospital     Our goal is to provide you with excellent care, therefore, visitors will be limited to so that we may focus on providing this care for you. Please contact your surgeon office, if you have any further questions.                  [facility] phone number:  [phone] PAT fax number: [phone]    Please note these are generalized instructions for all surgical cases, you may be provided with more specific instructions according to your surgery.

## 2022-05-31 ENCOUNTER — HOSPITAL ENCOUNTER (OUTPATIENT)
Age: 52
Setting detail: OUTPATIENT SURGERY
Discharge: HOME | End: 2022-05-31
Attending: INTERNAL MEDICINE | Admitting: INTERNAL MEDICINE
Payer: COMMERCIAL

## 2022-05-31 ENCOUNTER — ANESTHESIA (OUTPATIENT)
Dept: ENDOSCOPY | Age: 52
End: 2022-05-31
Payer: COMMERCIAL

## 2022-05-31 ENCOUNTER — ANESTHESIA EVENT (OUTPATIENT)
Dept: ENDOSCOPY | Age: 52
End: 2022-05-31
Payer: COMMERCIAL

## 2022-05-31 VITALS
RESPIRATION RATE: 17 BRPM | TEMPERATURE: 97.2 F | OXYGEN SATURATION: 97 % | HEIGHT: 72 IN | SYSTOLIC BLOOD PRESSURE: 118 MMHG | WEIGHT: 193.12 LBS | BODY MASS INDEX: 26.16 KG/M2 | HEART RATE: 58 BPM | DIASTOLIC BLOOD PRESSURE: 78 MMHG

## 2022-05-31 DIAGNOSIS — Z98.84 HISTORY OF GASTRIC BYPASS: ICD-10-CM

## 2022-05-31 DIAGNOSIS — Z12.11 SCREENING FOR COLON CANCER: ICD-10-CM

## 2022-05-31 PROCEDURE — 7100000000 HC PACU RECOVERY - FIRST 15 MIN: Performed by: INTERNAL MEDICINE

## 2022-05-31 PROCEDURE — 3700000000 HC ANESTHESIA ATTENDED CARE: Performed by: INTERNAL MEDICINE

## 2022-05-31 PROCEDURE — 6360000002 HC RX W HCPCS: Performed by: ANESTHESIOLOGY

## 2022-05-31 PROCEDURE — 3609010600 HC COLONOSCOPY POLYPECTOMY SNARE/COLD BIOPSY: Performed by: INTERNAL MEDICINE

## 2022-05-31 PROCEDURE — 6360000002 HC RX W HCPCS

## 2022-05-31 PROCEDURE — 3700000001 HC ADD 15 MINUTES (ANESTHESIA): Performed by: INTERNAL MEDICINE

## 2022-05-31 PROCEDURE — 88305 TISSUE EXAM BY PATHOLOGIST: CPT

## 2022-05-31 PROCEDURE — 3609012400 HC EGD TRANSORAL BIOPSY SINGLE/MULTIPLE: Performed by: INTERNAL MEDICINE

## 2022-05-31 PROCEDURE — 7100000001 HC PACU RECOVERY - ADDTL 15 MIN: Performed by: INTERNAL MEDICINE

## 2022-05-31 PROCEDURE — 2580000003 HC RX 258: Performed by: ANESTHESIOLOGY

## 2022-05-31 PROCEDURE — 7100000010 HC PHASE II RECOVERY - FIRST 15 MIN: Performed by: INTERNAL MEDICINE

## 2022-05-31 PROCEDURE — 7100000011 HC PHASE II RECOVERY - ADDTL 15 MIN: Performed by: INTERNAL MEDICINE

## 2022-05-31 PROCEDURE — 2709999900 HC NON-CHARGEABLE SUPPLY: Performed by: INTERNAL MEDICINE

## 2022-05-31 PROCEDURE — 2500000003 HC RX 250 WO HCPCS

## 2022-05-31 RX ORDER — PROPOFOL 10 MG/ML
INJECTION, EMULSION INTRAVENOUS PRN
Status: DISCONTINUED | OUTPATIENT
Start: 2022-05-31 | End: 2022-05-31 | Stop reason: SDUPTHER

## 2022-05-31 RX ORDER — LIDOCAINE HYDROCHLORIDE 20 MG/ML
INJECTION, SOLUTION EPIDURAL; INFILTRATION; INTRACAUDAL; PERINEURAL PRN
Status: DISCONTINUED | OUTPATIENT
Start: 2022-05-31 | End: 2022-05-31 | Stop reason: SDUPTHER

## 2022-05-31 RX ORDER — SODIUM CHLORIDE 9 MG/ML
INJECTION, SOLUTION INTRAVENOUS PRN
Status: DISCONTINUED | OUTPATIENT
Start: 2022-05-31 | End: 2022-05-31 | Stop reason: HOSPADM

## 2022-05-31 RX ORDER — MIDAZOLAM HYDROCHLORIDE 1 MG/ML
1 INJECTION INTRAMUSCULAR; INTRAVENOUS
Status: COMPLETED | OUTPATIENT
Start: 2022-05-31 | End: 2022-05-31

## 2022-05-31 RX ORDER — SODIUM CHLORIDE 9 MG/ML
INJECTION, SOLUTION INTRAVENOUS CONTINUOUS
Status: DISCONTINUED | OUTPATIENT
Start: 2022-05-31 | End: 2022-05-31 | Stop reason: HOSPADM

## 2022-05-31 RX ORDER — ONDANSETRON 2 MG/ML
4 INJECTION INTRAMUSCULAR; INTRAVENOUS
Status: DISCONTINUED | OUTPATIENT
Start: 2022-05-31 | End: 2022-05-31 | Stop reason: HOSPADM

## 2022-05-31 RX ORDER — SODIUM CHLORIDE 0.9 % (FLUSH) 0.9 %
5-40 SYRINGE (ML) INJECTION EVERY 12 HOURS SCHEDULED
Status: DISCONTINUED | OUTPATIENT
Start: 2022-05-31 | End: 2022-05-31 | Stop reason: HOSPADM

## 2022-05-31 RX ORDER — SODIUM CHLORIDE 0.9 % (FLUSH) 0.9 %
5-40 SYRINGE (ML) INJECTION PRN
Status: DISCONTINUED | OUTPATIENT
Start: 2022-05-31 | End: 2022-05-31 | Stop reason: HOSPADM

## 2022-05-31 RX ORDER — PROPOFOL 10 MG/ML
INJECTION, EMULSION INTRAVENOUS CONTINUOUS PRN
Status: DISCONTINUED | OUTPATIENT
Start: 2022-05-31 | End: 2022-05-31 | Stop reason: SDUPTHER

## 2022-05-31 RX ADMIN — MIDAZOLAM 1 MG: 1 INJECTION INTRAMUSCULAR; INTRAVENOUS at 12:06

## 2022-05-31 RX ADMIN — PROPOFOL 80 MG: 10 INJECTION, EMULSION INTRAVENOUS at 13:07

## 2022-05-31 RX ADMIN — LIDOCAINE HYDROCHLORIDE 100 MG: 20 INJECTION, SOLUTION EPIDURAL; INFILTRATION; INTRACAUDAL; PERINEURAL at 13:07

## 2022-05-31 RX ADMIN — SODIUM CHLORIDE: 9 INJECTION, SOLUTION INTRAVENOUS at 13:02

## 2022-05-31 RX ADMIN — PROPOFOL 180 MCG/KG/MIN: 10 INJECTION, EMULSION INTRAVENOUS at 13:07

## 2022-05-31 ASSESSMENT — PAIN - FUNCTIONAL ASSESSMENT: PAIN_FUNCTIONAL_ASSESSMENT: 0-10

## 2022-05-31 ASSESSMENT — PAIN SCALES - GENERAL
PAINLEVEL_OUTOF10: 0
PAINLEVEL_OUTOF10: 0

## 2022-05-31 NOTE — H&P
Pre-operative History and Physical    Patient: Krys Lamb  : 1970  Acct#:     Intended Procedure:  EGD and colonoscopy     HISTORY OF PRESENT ILLNESS:  The patient is a 47 y.o. male  who presents for screening colonoscopy and surveillance of anastomotic ulcer. Past Medical History:        Diagnosis Date    Alcoholism [facility])     last drink     Allergic migraine with status migrainosus     Allergic rhinitis, seasonal     Anemia     Arthritis     right knee    Vaughn's esophagus     Benign intracranial hypertension     Cancer (HCC)     renal     Carpal tunnel syndrome     Chronic pain     Depression     Ear infection 2022    GERD (gastroesophageal reflux disease)     Headache(784.0)     History of blood transfusion     History of tobacco use     Quit 2014    Migraine     chronic for 1 year    Morbid obesity ([address]     Movement disorder     Onychomycosis     Sleep apnea, obstructive     Severe uses cpap stop bang 6    Unspecified cerebral artery occlusion with cerebral infarction     slight weakness in left arm    Wears dentures     full set    Wears glasses      Past Surgical History:        Procedure Laterality Date    ABDOMEN SURGERY      gastric bypass    ABDOMEN SURGERY  2016    repair of recurrent incisional hernia with mesh, removal of old mesh, bilateral component separation    ABDOMINAL EXPLORATION SURGERY  16    exp lap, lysis of adhesions, small bowel resection    CARPAL TUNNEL RELEASE      bilat    DILATATION, ESOPHAGUS      ENDOSCOPY, COLON, DIAGNOSTIC      EYE SURGERY      GASTRIC BYPASS SURGERY  2009    Has lost about 200 pounds.     HERNIA REPAIR  3-    ventral    JOINT REPLACEMENT      KIDNEY REMOVAL Left 2018    KNEE ARTHROSCOPY Right 2013    Dr.Robert Sanders     KNEE ARTHROSCOPY Right 12    RIGHT KNEE ARTHROSCOPY WITH CHONDROPLASTY    KNEE SURGERY  2012    right, arthroscopy    KNEE SURGERY Right 2/18/2020    INCISION AND DRAINAGE RIGHT KNEE AND WOULD VAC PLACEMENT performed by June Zaragoza MD at . [address] Right 2/26/2021    INCISION AND DRAINAGE OF RIGHT KNEE HEMATOMA performed by An Egan MD at . [address] Right 3/5/2021    INCISION AND DRAINAGE AND CLOSURE RIGHT TOTAL KNEE performed by An Egan MD at [address] [facility]  2005    OTHER SURGICAL HISTORY Left 03/08/2016    CT biopsy ablasion left kidney    OTHER SURGICAL HISTORY  2016    small intestine 12 inch removed, 2 hernia surgeries, another surgery to drain infection from incision.  REVISION TOTAL KNEE ARTHROPLASTY Right 12/17/2019    RIGHT REVISION TIBIA TOTAL KNEE REPLACEMENT performed by June Zaragoza MD at [address] [place] [address] Right 1/22/2020    RIGHT KNEE IRRIGATION AND DEBRIDEMENT WITH POLY EXCHANGE performed by Kiarra Barr MD at [address] [place] [address] Right 2/16/2021    RIGHT REMOVAL OF EXPLANT AND TOTAL KNEE REPLACEMENT [facility] [facility]    RIGHT    TOTAL KNEE ARTHROPLASTY Right 8/12/2020    RIGHT ARTHROPLASY  RESECTION WITH INSERTION OF SPACER performed by June Zaragoza MD at [address] [facility] [address]  6-7-2016    UPPER GASTROINTESTINAL ENDOSCOPY  04/02/2018    UPPER GASTROINTESTINAL ENDOSCOPY N/A 3/24/2022    ESOPHAGOGASTRODUODENOSCOPY performed by Jones Welch MD at [address] [facility]     Medications Prior to Admission:   No current facility-administered medications on file prior to encounter.      Current Outpatient Medications on File Prior to Encounter   Medication Sig Dispense Refill    clopidogrel (PLAVIX) 75 MG tablet TAKE 1 TABLET BY MOUTH EVERY DAY 90 tablet 2    pantoprazole (PROTONIX) 40 MG tablet TAKE 1 TABLET BY MOUTH TWICE A  tablet 1    FLUoxetine (PROZAC) 20 MG capsule Take 1 capsule by mouth daily 90 capsule 1    traZODone (DESYREL) 100 MG tablet Take 2 tablets by mouth nightly 180 tablet 1    ondansetron (ZOFRAN ODT) 4 MG disintegrating tablet Take 1 tablet by mouth every 8 hours as needed for Nausea 20 tablet 0    atorvastatin (LIPITOR) 40 MG tablet TAKE 1 TABLET BY MOUTH EVERYDAY AT BEDTIME 90 tablet 1    gabapentin (NEURONTIN) 600 MG tablet Take 600 mg by mouth 4 times daily.  sildenafil (REVATIO) 20 MG tablet Take 4 tablets by mouth as needed (30 min prior to intercourse) 30 tablet 0    calcium-vitamin D (OSCAL 500/200 D-3) 500-200 MG-UNIT per tablet Take 1 tablet by mouth 2 times daily       Multiple Vitamin TABS Take 1 tablet by mouth daily           Allergies:  Nsaids, Morphine, Prochlorperazine, Valtrex [valacyclovir hcl], Fentanyl, Morphine and related, and Tolmetin    Social History:   TOBACCO:   reports that he quit smoking about 12 months ago. His smoking use included cigarettes. He started smoking about 37 years ago. He has a 12.50 pack-year smoking history. He has never used smokeless tobacco.  ETOH:   reports no history of alcohol use. DRUGS:   reports current drug use. Drug: Marijuana Fronie Herminio). PHYSICAL EXAM:      Vital Signs: BP (!) 143/79   Pulse 71   Temp 97.7 °F (36.5 °C) (Temporal)   Resp 17   Ht 6' (1.829 m)   Wt 193 lb 2 oz (87.6 kg)   SpO2 97%   BMI 26.19 kg/m²    Airway: No stridor or wheezing noted. Good air movement  Pulmonary: without wheezes.   Clear to auscultation  Cardiac:regular rate and rhythm without loud murmurs  Abdomen:soft, nontender,  Bowel sounds present    Pre-Procedure Assessment / Plan:  1) GJ anastomotic ulcer   2) Screening colonoscopy     ASA Grade:  ASA 3 - Patient with moderate systemic disease with functional limitations  Mallampati Classification:  Class II    Level of Sedation Plan:Deep sedation    Post Procedure plan: Return to same level of care    I assessed the patient and find that the patient is in satisfactory condition to proceed with the planned procedure and sedation plan. I have explained the risk, benefits, and alternatives to the procedure; the patient understands and agrees to proceed. The patient was counseled at length about the risks of michelle Covid-19 during their perioperative period and any recovery window from their procedure. The patient was made aware that michelle Covid-19  may worsen their prognosis for recovering from their procedure  and lend to a higher morbidity and/or mortality risk. All material risks, benefits, and reasonable alternatives including postponing the procedure were discussed. The patient does wish to proceed with the procedure at this time.       Jim Vanegas MD  5/31/2022

## 2022-05-31 NOTE — PROGRESS NOTES
PACU Transfer Note    Vitals:    05/31/22 1430   BP: 117/82   Pulse: 52   Resp: 15   Temp: (!) 96.7 °F (35.9 °C)   SpO2: 97%       No intake/output data recorded.     Pain assessment:  none  Pain Level: 2    Report given to Receiving unit RN.    5/31/2022 2:33 PM

## 2022-05-31 NOTE — ANESTHESIA PRE PROCEDURE
Department of Anesthesiology  Preprocedure Note       Name:  Gordon Barrera   Age:  47 y.o.  :  1970                                          MRN:  [id number]         Date:  2022      Surgeon: Evans Stafford):  Sissy Bernheim, MD    Procedure: Procedure(s):  COLONOSCOPY  ESOPHAGOGASTRODUODENOSCOPY    Medications prior to admission:   Prior to Admission medications    Medication Sig Start Date End Date Taking? Authorizing Provider   clopidogrel (PLAVIX) 75 MG tablet TAKE 1 TABLET BY MOUTH EVERY DAY 22   Kiana Lucero MD   pantoprazole (PROTONIX) 40 MG tablet TAKE 1 TABLET BY MOUTH TWICE A DAY 22   Luis Abraham MD   FLUoxetine [facility]) 20 MG capsule Take 1 capsule by mouth daily 22   Luis Abraham MD   traZODone (DESYREL) 100 MG tablet Take 2 tablets by mouth nightly 22   Luis Abraham MD   ondansetron (ZOFRAN ODT) 4 MG disintegrating tablet Take 1 tablet by mouth every 8 hours as needed for Nausea 3/7/22   Zoraida Quiroz MD   atorvastatin (LIPITOR) 40 MG tablet TAKE 1 TABLET BY MOUTH EVERYDAY AT BEDTIME 1/10/22   Luis Abraham MD   gabapentin (NEURONTIN) 600 MG tablet Take 600 mg by mouth 4 times daily.   21   Historical Provider, MD   sildenafil (REVATIO) 20 MG tablet Take 4 tablets by mouth as needed (30 min prior to intercourse) 21   Luis Abraham MD   calcium-vitamin D (OSCAL 500/200 D-3) 500-200 MG-UNIT per tablet Take 1 tablet by mouth 2 times daily     Historical MD Darci   Multiple Vitamin TABS Take 1 tablet by mouth daily     Historical Provider, MD       Current medications:    Current Facility-Administered Medications   Medication Dose Route Frequency Provider Last Rate Last Admin    0.9 % sodium chloride infusion   IntraVENous Continuous Saima Calzada MD        sodium chloride flush 0.9 % injection 5-40 mL  5-40 mL IntraVENous 2 times per day Saima Calzada MD        sodium chloride flush 0.9 % injection 5-40 mL  5-40 mL IntraVENous PRN Candis Pope MD        0.9 % sodium chloride infusion   IntraVENous PRN Candis Pope MD           Allergies: Allergies   Allergen Reactions    Nsaids Nausea Only and Other (See Comments)     Hx of Barretts esophagus      Morphine Hives and Itching     Pt gets Hives/itching. Does not tolerate     Prochlorperazine Other (See Comments)     No allergic reaction, patient reported sense of \"restlessness\" and fidgiting    Valtrex [Valacyclovir Hcl] Diarrhea    Fentanyl Itching    Morphine And Related Hives and Itching    Tolmetin Nausea Only     Hx of Barretts esophagus       Problem List:    Patient Active Problem List   Diagnosis Code    Insomnia-chronic intermittent-- treated with edible THC G47.00    Vaughn esophagus-on daily ppi-last egd 10/20 Dr Josephine Fritz K22.70    Allergic rhinitis, seasonal J30.2    Obstructive sleep apnea,Severe -(on cpap) G47.33    Depression-on daily effexor xr--sees dr Luz Hopkins. 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 Z90.81    Chronic pain syndrome- abdominal and head G89.4    History of stroke: right insular cortex on June 8, 2014 (small PFO; hypergoag w/u neg,  neurology) Z86.73    Gastroesophageal reflux disease with esophagitis--on daily ppi K21.00    Intractable chronic migraine without aura and with status migrainosus G43.711    Iron deficiency anemia D50.9    B12 deficiency E53.8    Malignant neoplasm of left kidney (HCC) clear cell. 8/1/18 Dr Nitish Doyle. C64.2    History of depression Z86.59    History of alcoholism ([address] F10.21    History of total knee arthroplasty, right Z96.651    Hematoma of right knee region S80. Rayne  TIA (transient ischemic attack) LUE weakness 3/21 G45.9    Tobacco use Z72.0    Acute deep vein thrombosis (DVT) of calf muscle vein of both lower extremities (HCC) I82.463    Closed compression fracture of body of L1 vertebra ([facility] S32.010A    Osteopenia of multiple sites M85.89    Small bowel obstruction ([facility] [address] [facility] K56.609    Contusion of left hip S70. 0XA    Current smoker F17.200    Intractable nausea and vomiting R11.2       Past Medical History:        Diagnosis Date    Alcoholism ([facility] [address] [facility]     last drink 2015    Allergic migraine with status migrainosus     Allergic rhinitis, seasonal     Anemia     Arthritis     right knee    Vaughn's esophagus     Benign intracranial hypertension     Cancer (HCC)     renal     Carpal tunnel syndrome     Chronic pain     Depression     Ear infection 05/06/2022    GERD (gastroesophageal reflux disease)     Headache(784.0)     History of blood transfusion     History of tobacco use     Quit 7/2014    Migraine     chronic for 1 year    Morbid obesity ([facility] [address]     Movement disorder     Onychomycosis     Sleep apnea, obstructive     Severe uses cpap stop bang 6    Unspecified cerebral artery occlusion with cerebral infarction 2014    slight weakness in left arm    Wears dentures     full set    Wears glasses        Past Surgical History:        Procedure Laterality Date    ABDOMEN SURGERY      gastric bypass    ABDOMEN SURGERY  4-7-2016    repair of recurrent incisional hernia with mesh, removal of old mesh, bilateral component separation    ABDOMINAL EXPLORATION SURGERY  1/11/16    exp lap, lysis of adhesions, small bowel resection    CARPAL TUNNEL RELEASE      bilat    DILATATION, ESOPHAGUS      ENDOSCOPY, COLON, DIAGNOSTIC      EYE SURGERY      GASTRIC BYPASS SURGERY  Jan 2009    Has lost about 200 pounds.     HERNIA REPAIR  3-    ventral    JOINT REPLACEMENT      KIDNEY REMOVAL Left 08/01/2018    KNEE ARTHROSCOPY Right 7/11/2013    Dr.Robert Sanders     KNEE ARTHROSCOPY Right 7/11/12    RIGHT KNEE ARTHROSCOPY WITH CHONDROPLASTY    KNEE SURGERY  July 2012    right, arthroscopy    KNEE SURGERY Right 2/18/2020    INCISION AND DRAINAGE RIGHT KNEE AND WOULD VAC PLACEMENT performed by Bhavana Marques MD at . [address] Right 2021    INCISION AND DRAINAGE OF RIGHT KNEE HEMATOMA performed by Dandy Barksdale MD at [address] Right 3/5/2021    INCISION AND DRAINAGE AND CLOSURE RIGHT TOTAL KNEE performed by Dandy Barksdale MD at [address] [facility]      OTHER SURGICAL HISTORY Left 2016    CT biopsy ablasion left kidney    OTHER SURGICAL HISTORY  2016    small intestine 12 inch removed, 2 hernia surgeries, another surgery to drain infection from incision.      REVISION TOTAL KNEE ARTHROPLASTY Right 2019    RIGHT REVISION TIBIA TOTAL KNEE REPLACEMENT performed by Bhavana Marques MD at [address] [facility] Right 2020    RIGHT KNEE IRRIGATION AND DEBRIDEMENT WITH POLY EXCHANGE performed by Mohinder Faustin MD at [address] [facility] Right 2021    RIGHT REMOVAL OF EXPLANT AND TOTAL KNEE REPLACEMENT [facility] [address] [facility]    RIGHT    TOTAL KNEE ARTHROPLASTY Right 2020    RIGHT ARTHROPLASY  RESECTION WITH INSERTION OF SPACER performed by Bhavana Marques MD at [address] [facility] [place]  2016    UPPER GASTROINTESTINAL ENDOSCOPY  2018    UPPER GASTROINTESTINAL ENDOSCOPY N/A 3/24/2022    ESOPHAGOGASTRODUODENOSCOPY performed by Isis Wisdom MD at [facility] History:    Social History     Tobacco Use    Smoking status: Former Smoker     Packs/day: 0.50     Years: 25.00     Pack years: 12.50     Types: Cigarettes     Start date: 1985     Quit date: 2021     Years since quittin.0    Smokeless tobacco: Never Used    Tobacco comment: Patient vapes   Substance Use Topics    Alcohol use: No     Alcohol/week: 0.0 standard drinks     Comment: last drink                                 Counseling given: Not Answered  Comment: Patient earline      Vital Signs (Current):   Vitals:    05/20/22 1056 05/31/22 1110   BP:  (!) 143/79   Pulse:  71   Resp:  17   Temp:  97.7 °F (36.5 °C)   TempSrc:  Temporal   SpO2:  97%   Weight: 200 lb (90.7 kg) 193 lb 2 oz (87.6 kg)   Height: 6' (1.829 m)                                               BP Readings from Last 3 Encounters:   05/31/22 (!) 143/79   05/06/22 100/70   05/04/22 114/68       NPO Status: Time of last liquid consumption: 0600                        Time of last solid consumption: 1800                        Date of last liquid consumption: 05/30/22                        Date of last solid food consumption: 05/26/22    BMI:   Wt Readings from Last 3 Encounters:   05/31/22 193 lb 2 oz (87.6 kg)   05/06/22 206 lb 6.4 oz (93.6 kg)   05/04/22 207 lb 3.2 oz (94 kg)     Body mass index is 26.19 kg/m². CBC:   Lab Results   Component Value Date    WBC 6.4 03/23/2022    RBC 4.10 03/23/2022    RBC 4.94 01/07/2015    HGB 12.0 03/23/2022    HCT 36.9 03/23/2022    MCV 89.9 03/23/2022    RDW 15.8 03/23/2022     03/23/2022       CMP:   Lab Results   Component Value Date     03/25/2022    K 4.0 03/25/2022     03/25/2022    CO2 23 03/25/2022    BUN 9 03/25/2022    CREATININE 0.9 03/25/2022    GFRAA >60 03/25/2022    AGRATIO 2.1 03/07/2022    LABGLOM >60 03/25/2022    GLUCOSE 77 03/25/2022    GLUCOSE 84 01/07/2015    PROT 6.6 03/22/2022    PROT 6.7 01/07/2015    CALCIUM 9.1 03/25/2022    BILITOT 0.5 03/22/2022    ALKPHOS 110 03/22/2022    AST 14 03/22/2022    ALT 10 03/22/2022       POC Tests: No results for input(s): POCGLU, POCNA, POCK, POCCL, POCBUN, POCHEMO, POCHCT in the last 72 hours.     Coags:   Lab Results   Component Value Date    PROTIME 13.2 08/05/2021    INR 1.16 08/05/2021    APTT 37.6 02/09/2021       HCG (If Applicable): No results found for: PREGTESTUR, PREGSERUM, HCG, HCGQUANT     ABGs: No results found for: PHART, PO2ART, ULH6UGC, RZX3ESM, BEART, G2EMFDNI     Type & Screen (If Applicable):  No results found for: LABABO, LABRH    Drug/Infectious Status (If Applicable):  No results found for: HIV, HEPCAB    COVID-19 Screening (If Applicable):   Lab Results   Component Value Date    COVID19 Not Detected 01/18/2022    COVID19 Not Detected 02/12/2021    COVID19 NOT DETECTED 08/06/2020           Anesthesia Evaluation  Patient summary reviewed no history of anesthetic complications:   Airway: Mallampati: II  TM distance: >3 FB   Neck ROM: full  Mouth opening: > = 3 FB   Dental:    (+) edentulous      Pulmonary: breath sounds clear to auscultation  (+) sleep apnea: on CPAP,                             Cardiovascular:  Exercise tolerance: good (>4 METS),   (+) dysrhythmias (bradycardia with loop recorder):, hyperlipidemia    (-) past MI, CAD,  CHF and orthopnea      Rhythm: regular  Rate: normal                 ROS comment: TTE 2021:     Conclusions      Summary   Normal left ventricle size, wall thickness, and systolic function with an   estimated ejection fraction of 55%. No regional wall motion abnormalities   are seen. A bubble study was performed and fails to show evidence of right to left   shunting. A 25 mm Amplatzer PFO occluder device was seen and appears well seated   between the right and left atria. No evidence of any pericardial effusion. Neuro/Psych:   (+) CVA (intermittent left sided weakness, short term memory loss): residual symptoms, TIA, headaches: migraine headaches, psychiatric history (ETOH abuse):depression/anxiety              ROS comment: History of kyphoplasty GI/Hepatic/Renal:   (+) GERD:, bowel prep,          ROS comment: Abdominal pain and N+V for admission. Endo/Other:    (+) blood dyscrasia::., malignancy/cancer. Abdominal:             Vascular: negative vascular ROS. Other Findings:             Anesthesia Plan      MAC     ASA 3       Induction: intravenous.       Anesthetic plan and risks discussed with patient. Plan discussed with CRNA. Attending anesthesiologist reviewed and agrees with Preprocedure content        This pre-anesthesia assessment may be used as a history and physical.    DOS STAFF ADDENDUM:    Pt seen and examined, chart reviewed (including anesthesia, drug and allergy history). No interval changes to history and physical examination. Anesthetic plan, risks, benefits, alternatives, and personnel involved discussed with patient. Patient verbalized an understanding and agrees to proceed.       Saima Calzada MD  May 31, 2022  11:56 AM            Saima Calzada MD   5/31/2022

## 2022-05-31 NOTE — PROGRESS NOTES
Patient admitted to PACU # 13 from ENDO at [address] [place] [address] [address]  per Dr. Karie Fields. Attached to PACU monitoring system and report received from anesthesia provider. Patient was reported to be hemodynamically stable during procedure. Patient sleepy from anesthesia. RR > 10. Continue to monitor.  Oral airway in place

## 2022-05-31 NOTE — OP NOTE
Endoscopy Note    Patient: Zuleima Cameron  : 1970  Acct#:     Procedure: Esophagogastroduodenoscopy with biopsy                       Colonoscopy with polypectomy (cold snare)    Date:  2022     Endoscopist:   Sandrea Carrel, MD    Referring Physician:  Aletha Vang MD     HISTORY OF PRESENT ILLNESS:  The patient is a 47 y.o. male  who presents for screening colonoscopy and surveillance of anastomotic ulcer. Previous Colonoscopy: NO  Date: N/A  Greater than 3 years: N/A    Postoperative Diagnosis:    - Non obstructive ring in the mid esophagus that was not disturbed  - 3mm anastomotic ulcer at [address] anastomosis remains present   - suture visualized from RYGB without surrounding erythema   - S/p RYGB with small gastric pouch  - Healthy small bowel and blind limb   - 6mm flat ascending colon polyp removed with cold snare polypectomy  - 4mm sessile rectal polyp removed with cold snare polypectomy   - Mild sigmoid diverticulosis   - small to medium internal hemorrhoids     Anesthesia:  Administered by the anesthesia service during MAC     Consent:  The patient or their legal guardian has signed a consent, and is aware of the potential risks, benefits, alternatives, and potential complications of this procedure. These include, but are not limited to hemorrhage, bleeding, post procedural pain, perforation, phlebitis, aspiration, hypotension, hypoxia, cardiovascular events such as arryhthmia, and possibly death. Additionally, the possibility of missed colonic polyps and interval colon cancer was discussed in the consent. Description of Procedure: The patient was then taken to the endoscopy suite, placed in the left lateral decubitus position and the above IV sedation was administrered. Upper Endoscopy procedure description  The Olympus video upper endoscope was placed through the patient's oropharynx without difficulty to the extent of the 2nd portion of the duodenum.   Both forward and retroflexed views of the stomach were obtained. Findings:    Esophagus: The esophagus appeared normal without evidence of Vaughn's esophagus or reflux esophagitis. The Z line was located at 40 cm, the GE junction at 40 cm, and the hiatus at 40 cm. There was a non obstructive ring in the mid esophagus that was not disturbed. Stomach: The gastric pouch was noted and appeared notable for a 3mm anastomotic ulcer at [address] anastomosis that remains present. Biopsies were taken from the gastric pouch for H pylori     Small bowel: Appeared normal with normal villous pattern. The scope was then withdrawn back into the stomach, it was decompressed, and the scope was completely withdrawn. Colonoscopy procedure description  A digital rectal examination was performed and revealed negative without mass, lesions or tenderness. The patient was placed in the left lateral position and monitored continuously with ECG tracing, pulse oximetry monitoring and direct observations. Medications were administered incrementally over the course of the procedure to achieve an adequate level of conscious sedation. After digital examination of the rectum was performed, the Olympus CF H 190L  was inserted into the rectum and advanced under direct vision to the cecum, which was identified by the appendiceal orifice and ileocecal valve . The procedure was considered more difficult than average. Additional maneuvers used to reach the cecum: abd pressure. Overall the patient tolerated the procedure well, without undue discomfort, hypotension or desaturation. The patient was adequately recovered in the endoscopy suite and was discharged to home. The preparation was good. During withdrawal examination, the final quality of the prep was [facility] Bowel Prep Scale: Right Colon: Grade 2 Transverse Colon: Grade 3 Left Colon: Grade 3     Additional rinsing and suctioning were necessary to obtain adequate views.   A careful inspection was made as the colonoscope was withdrawn. This did include a retroflexed evaluation of the rectum. Findings and interventions are described below. Appropriate photo documentation was obtained. Terminal ileum: Normal mucosa with bile in lumen     The scope was then withdrawn back through the cecum, ascending, transverse, descending, sigmoid colon, and rectum. Careful circumferential examination of the mucosa in these areas demonstrated:    Cecum: Normal appearing mucosa     Ascending colon: 6mm flat ascending colon polyp removed with cold snare polypectomy    Transverse colon: Normal appearing mucosa     Descending colon: Normal appearing mucosa     Sigmoid colon: Scattered mild diverticulosis     Rectum: 4mm sessile rectal polyp removed with cold snare polypectomy   The scope was then withdrawn into the rectum and retroflexed. The retroflexed view of the anal verge and rectum demonstrates small to medium internal hemorrhoids . The scope was straightened, the colon was decompressed and the scope was withdrawn from the patient. The patient tolerated the procedure well and was taken to the PACU in good condition. Estimated blood loss:  None    ID Type Source Tests Collected by Time Destination   A : Biopsies of gastric pouch Gastric Gastric SURGICAL PATHOLOGY Apollo Chambers MD 5/31/2022 1311    B : Ascending colon polyp cold snare Tissue Colon SURGICAL PATHOLOGY Apollo Chambers MD 5/31/2022 1333    C : Rectal polyp Specimen Rectum SURGICAL PATHOLOGY Apollo Chambers MD 5/31/2022 1335        Impression:    1) See post procedure diagnoses    Recommendations: The patient had biopsies taken today. The patient should call for results in 7 days if they have not heard from our office. Our number is [phone]. Repeat colonoscopy in 7 years for screening   Follow up in GI clinic with Dr. Emigdio Hernandez   OK to resume clopidgrel tomorrow.      Apollo Chambers MD  [facility] [place]  [phone]

## 2022-05-31 NOTE — ANESTHESIA POSTPROCEDURE EVALUATION
Department of Anesthesiology  Postprocedure Note    Patient: Gertrude Aaron  MRN: [id number]  YOB: 1970  Date of evaluation: 5/31/2022  Time:  4:40 PM     Procedure Summary     Date: 05/31/22 Room / Location: [address] [place] [address]    Anesthesia Start: 1302 Anesthesia Stop: 1347    Procedures:       COLONOSCOPY POLYPECTOMY SNARE/COLD BIOPSY (N/A )      ESOPHAGOGASTRODUODENOSCOPY WITH BIOPSY Diagnosis:       Screening for colon cancer      History of gastric bypass      (SCREENING FOR COLN CANCER, HISTORY OF GASTRIC BYPASS)    Surgeons: Jodi Pettit MD Responsible Provider: Anthony Mccall MD    Anesthesia Type: MAC ASA Status: 3          Anesthesia Type: No value filed. Cande Phase I: Cande Score: 5    Cande Phase II: Cande Score: 10    Last vitals: Reviewed and per EMR flowsheets.        Anesthesia Post Evaluation    Patient location during evaluation: PACU  Patient participation: complete - patient participated  Level of consciousness: awake and alert  Pain score: 0  Airway patency: patent  Nausea & Vomiting: no nausea and no vomiting  Complications: no  Cardiovascular status: blood pressure returned to baseline  Respiratory status: acceptable  Hydration status: euvolemic

## 2022-06-14 ENCOUNTER — OFFICE VISIT (OUTPATIENT)
Dept: FAMILY MEDICINE CLINIC | Age: 52
End: 2022-06-14
Payer: COMMERCIAL

## 2022-06-14 VITALS
DIASTOLIC BLOOD PRESSURE: 62 MMHG | TEMPERATURE: 98.4 F | HEIGHT: 72 IN | SYSTOLIC BLOOD PRESSURE: 112 MMHG | HEART RATE: 72 BPM | WEIGHT: 201.2 LBS | OXYGEN SATURATION: 96 % | RESPIRATION RATE: 16 BRPM | BODY MASS INDEX: 27.25 KG/M2

## 2022-06-14 DIAGNOSIS — R10.11 RUQ ABDOMINAL PAIN: ICD-10-CM

## 2022-06-14 DIAGNOSIS — R10.11 RUQ ABDOMINAL PAIN: Primary | ICD-10-CM

## 2022-06-14 LAB
A/G RATIO: 2.3 (ref 1.1–2.2)
ALBUMIN SERPL-MCNC: 4.5 G/DL (ref 3.4–5)
ALP BLD-CCNC: 94 U/L (ref 40–129)
ALT SERPL-CCNC: 19 U/L (ref 10–40)
ANION GAP SERPL CALCULATED.3IONS-SCNC: 13 MMOL/L (ref 3–16)
AST SERPL-CCNC: 17 U/L (ref 15–37)
BASOPHILS ABSOLUTE: 0.1 K/UL (ref 0–0.2)
BASOPHILS RELATIVE PERCENT: 1.8 %
BILIRUB SERPL-MCNC: 0.4 MG/DL (ref 0–1)
BUN BLDV-MCNC: 6 MG/DL (ref 7–20)
CALCIUM SERPL-MCNC: 9.3 MG/DL (ref 8.3–10.6)
CHLORIDE BLD-SCNC: 104 MMOL/L (ref 99–110)
CO2: 23 MMOL/L (ref 21–32)
CREAT SERPL-MCNC: 1.1 MG/DL (ref 0.9–1.3)
EOSINOPHILS ABSOLUTE: 0.1 K/UL (ref 0–0.6)
EOSINOPHILS RELATIVE PERCENT: 2.9 %
GFR AFRICAN AMERICAN: >60
GFR NON-AFRICAN AMERICAN: >60
GLUCOSE BLD-MCNC: 102 MG/DL (ref 70–99)
HCT VFR BLD CALC: 40.6 % (ref 40.5–52.5)
HEMOGLOBIN: 13.5 G/DL (ref 13.5–17.5)
LYMPHOCYTES ABSOLUTE: 1.3 K/UL (ref 1–5.1)
LYMPHOCYTES RELATIVE PERCENT: 31.7 %
MCH RBC QN AUTO: 30.2 PG (ref 26–34)
MCHC RBC AUTO-ENTMCNC: 33.2 G/DL (ref 31–36)
MCV RBC AUTO: 91 FL (ref 80–100)
MONOCYTES ABSOLUTE: 0.3 K/UL (ref 0–1.3)
MONOCYTES RELATIVE PERCENT: 7.3 %
NEUTROPHILS ABSOLUTE: 2.4 K/UL (ref 1.7–7.7)
NEUTROPHILS RELATIVE PERCENT: 56.3 %
PDW BLD-RTO: 16.7 % (ref 12.4–15.4)
PLATELET # BLD: 375 K/UL (ref 135–450)
PMV BLD AUTO: 8.2 FL (ref 5–10.5)
POTASSIUM SERPL-SCNC: 4.5 MMOL/L (ref 3.5–5.1)
RBC # BLD: 4.46 M/UL (ref 4.2–5.9)
SODIUM BLD-SCNC: 140 MMOL/L (ref 136–145)
TOTAL PROTEIN: 6.5 G/DL (ref 6.4–8.2)
WBC # BLD: 4.2 K/UL (ref 4–11)

## 2022-06-14 PROCEDURE — 99213 OFFICE O/P EST LOW 20 MIN: CPT | Performed by: NURSE PRACTITIONER

## 2022-06-14 ASSESSMENT — ENCOUNTER SYMPTOMS
SHORTNESS OF BREATH: 0
VOMITING: 0
COUGH: 0
BLOOD IN STOOL: 0
ABDOMINAL PAIN: 1
DIARRHEA: 0
NAUSEA: 0

## 2022-06-14 NOTE — PROGRESS NOTES
6/14/2022    This is a 47 y.o. male   Chief Complaint   Patient presents with    Abdominal Pain     RUQ aches and gets sharp at times. had rt broken ribs about a year ago. pain has been chronic but got worse starting Friday. no recent injury. Jarret Guido HPI  Patient reports that he has off and on RUQ abd pain for a while. Started with increased and more consistent pain since Friday 6/10/22. Reports that the pain is mostly constant with sitting and standing. Does not notice as much if laying down. Pain is 8/10. Has not noticed difference with or without food intake. Denies nausea, vomiting, diarrhea, constipation. Has tried taking tylenol, but that caused an upset stomach. Currently wearing back brace. He reports that if he applies pressure to right upper quadrant that helps with pain. 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  Chronic pain syndrome- abdominal and head    History of stroke: right insular cortex on June 8, 2014 (small PFO; hypergoag w/u neg,  neurology)    Gastroesophageal reflux disease with esophagitis--on daily ppi    Intractable chronic migraine without aura and with status migrainosus    Iron deficiency anemia    B12 deficiency    Malignant neoplasm of left kidney (HCC) clear cell. 8/1/18 Dr Carlos Pope.     History of depression    History of alcoholism ([facility] [address]    History of total knee arthroplasty, right    Hematoma of right knee region    TIA (transient ischemic attack) LUE weakness 3/21    Tobacco use    Acute deep vein thrombosis (DVT) of calf muscle vein of both lower extremities (HCC)    Closed compression fracture of body of L1 vertebra (HCC)    Osteopenia of multiple sites    Small bowel obstruction (HCC)    Contusion of left hip    Current smoker    Intractable nausea and vomiting          Current Outpatient Medications   Medication Sig Dispense Refill    clopidogrel (PLAVIX) 75 MG tablet TAKE 1 TABLET BY MOUTH EVERY DAY 90 tablet 2    pantoprazole (PROTONIX) 40 MG tablet TAKE 1 TABLET BY MOUTH TWICE A  tablet 1    FLUoxetine (PROZAC) 20 MG capsule Take 1 capsule by mouth daily 90 capsule 1    traZODone (DESYREL) 100 MG tablet Take 2 tablets by mouth nightly 180 tablet 1    ondansetron (ZOFRAN ODT) 4 MG disintegrating tablet Take 1 tablet by mouth every 8 hours as needed for Nausea 20 tablet 0    atorvastatin (LIPITOR) 40 MG tablet TAKE 1 TABLET BY MOUTH EVERYDAY AT BEDTIME 90 tablet 1    gabapentin (NEURONTIN) 600 MG tablet Take 600 mg by mouth 4 times daily.  sildenafil (REVATIO) 20 MG tablet Take 4 tablets by mouth as needed (30 min prior to intercourse) 30 tablet 0    calcium-vitamin D (OSCAL 500/200 D-3) 500-200 MG-UNIT per tablet Take 1 tablet by mouth 2 times daily       Multiple Vitamin TABS Take 1 tablet by mouth daily        No current facility-administered medications for this visit. Allergies   Allergen Reactions    Nsaids Nausea Only and Other (See Comments)     Hx of Barretts esophagus      Morphine Hives and Itching     Pt gets Hives/itching. Does not tolerate     Prochlorperazine Other (See Comments)     No allergic reaction, patient reported sense of \"restlessness\" and fidgiting    Valtrex [Valacyclovir Hcl] Diarrhea    Fentanyl Itching    Morphine And Related Hives and Itching    Tolmetin Nausea Only     Hx of Barretts esophagus       Review of Systems   Constitutional: Positive for activity change. Negative for fever. Respiratory: Negative for cough and shortness of breath. Cardiovascular: Negative for chest pain. Gastrointestinal: Positive for abdominal pain. Negative for blood in stool, diarrhea, nausea and vomiting. Vitals:    06/14/22 0944   BP: 112/62   Site: Right Upper Arm   Position: Sitting   Cuff Size: Large Adult   Pulse: 72   Resp: 16   Temp: 98.4 °F (36.9 °C)   TempSrc: Oral   SpO2: 96%   Weight: 201 lb 3.2 oz (91.3 kg)   Height: 6' (1.829 m)       Body mass index is 27.29 kg/m². Wt Readings from Last 3 Encounters:   06/14/22 201 lb 3.2 oz (91.3 kg)   05/31/22 193 lb 2 oz (87.6 kg)   05/06/22 206 lb 6.4 oz (93.6 kg)       BP Readings from Last 3 Encounters:   06/14/22 112/62   05/31/22 118/78   05/06/22 100/70       Physical Exam  Vitals and nursing note reviewed. Constitutional:       General: He is not in acute distress. Appearance: He is well-developed. HENT:      Head: Normocephalic and atraumatic. Cardiovascular:      Rate and Rhythm: Normal rate and regular rhythm. Heart sounds: Normal heart sounds. No murmur heard. No friction rub. No gallop. Pulmonary:      Effort: Pulmonary effort is normal. No respiratory distress. Breath sounds: Normal breath sounds. Abdominal:      General: Bowel sounds are normal.      Palpations: Abdomen is soft. Tenderness: There is abdominal tenderness in the right upper quadrant. There is no guarding or rebound. Musculoskeletal:      Cervical back: Neck supple. Skin:     General: Skin is warm and dry. Neurological:      Mental Status: He is alert and oriented to person, place, and time. Psychiatric:         Behavior: Behavior normal.         Thought Content: Thought content normal.         Judgment: Judgment normal.         Assessmentand Plan  Nati Walsh was seen today for abdominal pain. Diagnoses and all orders for this visit:    RUQ abdominal pain  -     Comprehensive Metabolic Panel; Future  -     CBC with Auto Differential; Future  -     US GALLBLADDER RUQ; Future  Advised low-fat diet. Can take over-the-counter Tylenol per bottle instructions as needed.   Patient is to call if symptoms worsen before ultrasound is obtained. Return in about 2 weeks (around 6/28/2022), or if symptoms worsen or fail to improve, for abd pain , with Dr. Joe Merritt.

## 2022-06-15 ENCOUNTER — HOSPITAL ENCOUNTER (OUTPATIENT)
Dept: ULTRASOUND IMAGING | Age: 52
Discharge: HOME | End: 2022-06-15
Payer: COMMERCIAL

## 2022-06-15 DIAGNOSIS — R10.11 RUQ ABDOMINAL PAIN: ICD-10-CM

## 2022-06-15 PROCEDURE — 76705 ECHO EXAM OF ABDOMEN: CPT

## 2022-06-16 DIAGNOSIS — R10.11 RUQ ABDOMINAL PAIN: ICD-10-CM

## 2022-06-16 DIAGNOSIS — K80.20 CALCULUS OF GALLBLADDER WITHOUT CHOLECYSTITIS WITHOUT OBSTRUCTION: Primary | ICD-10-CM

## 2022-06-22 ENCOUNTER — NURSE ONLY (OUTPATIENT)
Dept: CARDIOLOGY CLINIC | Age: 52
End: 2022-06-22
Payer: COMMERCIAL

## 2022-06-22 ENCOUNTER — OFFICE VISIT (OUTPATIENT)
Dept: SURGERY | Age: 52
End: 2022-06-22
Payer: COMMERCIAL

## 2022-06-22 VITALS — WEIGHT: 201 LBS | BODY MASS INDEX: 27.26 KG/M2

## 2022-06-22 DIAGNOSIS — G45.9 TIA (TRANSIENT ISCHEMIC ATTACK): ICD-10-CM

## 2022-06-22 DIAGNOSIS — R10.11 RUQ PAIN: Primary | ICD-10-CM

## 2022-06-22 DIAGNOSIS — Z45.09 ENCOUNTER FOR LOOP RECORDER CHECK: ICD-10-CM

## 2022-06-22 PROCEDURE — 93298 REM INTERROG DEV EVAL SCRMS: CPT | Performed by: INTERNAL MEDICINE

## 2022-06-22 PROCEDURE — G2066 INTER DEVC REMOTE 30D: HCPCS | Performed by: INTERNAL MEDICINE

## 2022-06-22 PROCEDURE — 99214 OFFICE O/P EST MOD 30 MIN: CPT | Performed by: SURGERY

## 2022-06-22 NOTE — PROGRESS NOTES
Subjective:      Patient ID: Sofía Garner is a 47 y.o. male. HPI  CC: gallstones  Patient known to me secondary to chronic abdominal pain, occasional SBO. Reports chronic RUQ pain that has worsened over last week or 2. Worse with movements, certain activity. Better when he lies down. Denies association with meals but reports poor appetite lately. History GJ ulcer after gastric bypass, remains present on EGD 3 weeks ago. On PPI, follows with Dr. Abhijit Delaney. No change in bowel habits. Extensive surgical history. U/S with cholelithiasis without inflammatory changes      Review of Systems    Objective:   Physical Exam  Tender R axillary line, RUQ just below costal margin  Midline scar present  Assessment:       Diagnosis Orders   1. RUQ pain             Plan:      Symptoms atypical for biliary colic as no association with meals. He has chronic abdominal pain and gallstones felt to be incidental.  Recommend keeping a food diary and monitor for pain worse after fatty meals. If that develops then will consider lap duane. However, he is high risk for open procedure given his extensive surgical history as well as anesthesia complications with previous history TIA and current tobacco use. F/u GI as scheduled in next few weeks, continue PPI. Call/return with worsening postprandial pain.           Christin Rothman MD

## 2022-06-23 NOTE — PROGRESS NOTES
ILR for syncope and CVA. Remote transmission received from patient's monitor at home. Remote Linq report shows no arrhythmias/ or events. EP physician to review. See PACEART report under Cardiology tab. Will continue to monitor remotely. (End of 31-day monitoring period 6/22/22).

## 2022-06-29 RX ORDER — TRAZODONE HYDROCHLORIDE 100 MG/1
200 TABLET ORAL NIGHTLY
Qty: 180 TABLET | Refills: 1 | Status: SHIPPED | OUTPATIENT
Start: 2022-06-29

## 2022-06-29 RX ORDER — FLUOXETINE HYDROCHLORIDE 20 MG/1
20 CAPSULE ORAL DAILY
Qty: 90 CAPSULE | Refills: 1 | Status: SHIPPED | OUTPATIENT
Start: 2022-06-29

## 2022-07-25 ENCOUNTER — NURSE ONLY (OUTPATIENT)
Dept: CARDIOLOGY CLINIC | Age: 52
End: 2022-07-25

## 2022-07-25 DIAGNOSIS — Z45.09 ENCOUNTER FOR LOOP RECORDER CHECK: Primary | ICD-10-CM

## 2022-07-25 DIAGNOSIS — I63.9 CRYPTOGENIC STROKE (HCC): ICD-10-CM

## 2022-07-25 PROCEDURE — G2066 INTER DEVC REMOTE 30D: HCPCS | Performed by: INTERNAL MEDICINE

## 2022-07-25 PROCEDURE — 93298 REM INTERROG DEV EVAL SCRMS: CPT | Performed by: INTERNAL MEDICINE

## 2022-07-26 NOTE — PROGRESS NOTES
ILR for syncope and CVA. Remote transmission received from patient's monitor at home. Remote Linq report shows no arrhythmias/ or events. EP physician to review. See PACEART report under Cardiology tab. Will continue to monitor remotely.     (End of 31-day monitoring period 7/25/22)

## 2022-08-29 ENCOUNTER — NURSE ONLY (OUTPATIENT)
Dept: CARDIOLOGY CLINIC | Age: 52
End: 2022-08-29
Payer: COMMERCIAL

## 2022-08-29 DIAGNOSIS — G45.9 TIA (TRANSIENT ISCHEMIC ATTACK): Primary | ICD-10-CM

## 2022-08-29 DIAGNOSIS — Z45.09 ENCOUNTER FOR LOOP RECORDER CHECK: ICD-10-CM

## 2022-08-29 PROCEDURE — 93298 REM INTERROG DEV EVAL SCRMS: CPT | Performed by: INTERNAL MEDICINE

## 2022-08-29 PROCEDURE — G2066 INTER DEVC REMOTE 30D: HCPCS | Performed by: INTERNAL MEDICINE

## 2022-08-29 NOTE — PROGRESS NOTES
ILR for syncope and CVA. Remote transmission received from patient's monitor at home. Remote Linq report shows 1 pause event that is undersensing. EP physician to review. See PACEART report under Cardiology tab. Will continue to monitor remotely. (End of 31-day monitoring period 8/29/22).

## 2022-09-21 RX ORDER — ATORVASTATIN CALCIUM 40 MG/1
TABLET, FILM COATED ORAL
Qty: 90 TABLET | Refills: 1 | Status: SHIPPED | OUTPATIENT
Start: 2022-09-21

## 2022-09-21 RX ORDER — PANTOPRAZOLE SODIUM 40 MG/1
TABLET, DELAYED RELEASE ORAL
Qty: 180 TABLET | Refills: 1 | Status: SHIPPED | OUTPATIENT
Start: 2022-09-21

## 2022-09-26 ENCOUNTER — APPOINTMENT (OUTPATIENT)
Dept: MRI IMAGING | Age: 52
End: 2022-09-26
Payer: COMMERCIAL

## 2022-09-26 ENCOUNTER — HOSPITAL ENCOUNTER (EMERGENCY)
Age: 52
Discharge: HOME | End: 2022-09-26
Attending: EMERGENCY MEDICINE
Payer: COMMERCIAL

## 2022-09-26 VITALS
SYSTOLIC BLOOD PRESSURE: 115 MMHG | HEART RATE: 67 BPM | RESPIRATION RATE: 16 BRPM | TEMPERATURE: 97.8 F | DIASTOLIC BLOOD PRESSURE: 71 MMHG | OXYGEN SATURATION: 98 %

## 2022-09-26 DIAGNOSIS — R32 URINARY INCONTINENCE, UNSPECIFIED TYPE: ICD-10-CM

## 2022-09-26 DIAGNOSIS — G89.29 ACUTE EXACERBATION OF CHRONIC LOW BACK PAIN: Primary | ICD-10-CM

## 2022-09-26 DIAGNOSIS — F17.200 SMOKER: ICD-10-CM

## 2022-09-26 DIAGNOSIS — M54.50 ACUTE EXACERBATION OF CHRONIC LOW BACK PAIN: Primary | ICD-10-CM

## 2022-09-26 LAB
A/G RATIO: 1.3 (ref 1.1–2.2)
ALBUMIN SERPL-MCNC: 3.6 G/DL (ref 3.4–5)
ALP BLD-CCNC: 110 U/L (ref 40–129)
ALT SERPL-CCNC: 10 U/L (ref 10–40)
ANION GAP SERPL CALCULATED.3IONS-SCNC: 7 MMOL/L (ref 3–16)
AST SERPL-CCNC: 18 U/L (ref 15–37)
BASOPHILS ABSOLUTE: 0.1 K/UL (ref 0–0.2)
BASOPHILS RELATIVE PERCENT: 2 %
BILIRUB SERPL-MCNC: 0.5 MG/DL (ref 0–1)
BILIRUBIN URINE: NEGATIVE
BLOOD, URINE: NEGATIVE
BUN BLDV-MCNC: 6 MG/DL (ref 7–20)
CALCIUM SERPL-MCNC: 8.8 MG/DL (ref 8.3–10.6)
CHLORIDE BLD-SCNC: 103 MMOL/L (ref 99–110)
CLARITY: CLEAR
CO2: 29 MMOL/L (ref 21–32)
COLOR: YELLOW
CREAT SERPL-MCNC: 1 MG/DL (ref 0.9–1.3)
EOSINOPHILS ABSOLUTE: 0.5 K/UL (ref 0–0.6)
EOSINOPHILS RELATIVE PERCENT: 6.4 %
GFR AFRICAN AMERICAN: >60
GFR NON-AFRICAN AMERICAN: >60
GLUCOSE BLD-MCNC: 89 MG/DL (ref 70–99)
GLUCOSE URINE: NEGATIVE MG/DL
HCT VFR BLD CALC: 38.8 % (ref 40.5–52.5)
HEMOGLOBIN: 12.7 G/DL (ref 13.5–17.5)
KETONES, URINE: NEGATIVE MG/DL
LEUKOCYTE ESTERASE, URINE: NEGATIVE
LYMPHOCYTES ABSOLUTE: 2.5 K/UL (ref 1–5.1)
LYMPHOCYTES RELATIVE PERCENT: 34.1 %
MCH RBC QN AUTO: 30.2 PG (ref 26–34)
MCHC RBC AUTO-ENTMCNC: 32.9 G/DL (ref 31–36)
MCV RBC AUTO: 92 FL (ref 80–100)
MICROSCOPIC EXAMINATION: NORMAL
MONOCYTES ABSOLUTE: 0.7 K/UL (ref 0–1.3)
MONOCYTES RELATIVE PERCENT: 9 %
NEUTROPHILS ABSOLUTE: 3.6 K/UL (ref 1.7–7.7)
NEUTROPHILS RELATIVE PERCENT: 48.5 %
NITRITE, URINE: NEGATIVE
PDW BLD-RTO: 15.1 % (ref 12.4–15.4)
PH UA: 6 (ref 5–8)
PLATELET # BLD: 311 K/UL (ref 135–450)
PMV BLD AUTO: 7.8 FL (ref 5–10.5)
POTASSIUM REFLEX MAGNESIUM: 4.1 MMOL/L (ref 3.5–5.1)
PROTEIN UA: NEGATIVE MG/DL
RBC # BLD: 4.22 M/UL (ref 4.2–5.9)
SODIUM BLD-SCNC: 139 MMOL/L (ref 136–145)
SPECIFIC GRAVITY UA: 1.01 (ref 1–1.03)
TOTAL PROTEIN: 6.4 G/DL (ref 6.4–8.2)
URINE REFLEX TO CULTURE: NORMAL
URINE TYPE: NORMAL
UROBILINOGEN, URINE: 1 E.U./DL
WBC # BLD: 7.5 K/UL (ref 4–11)

## 2022-09-26 PROCEDURE — 85025 COMPLETE CBC W/AUTO DIFF WBC: CPT

## 2022-09-26 PROCEDURE — 72148 MRI LUMBAR SPINE W/O DYE: CPT

## 2022-09-26 PROCEDURE — 80053 COMPREHEN METABOLIC PANEL: CPT

## 2022-09-26 PROCEDURE — 99284 EMERGENCY DEPT VISIT MOD MDM: CPT

## 2022-09-26 PROCEDURE — 6370000000 HC RX 637 (ALT 250 FOR IP)

## 2022-09-26 PROCEDURE — 81003 URINALYSIS AUTO W/O SCOPE: CPT

## 2022-09-26 RX ORDER — PREDNISONE 10 MG/1
TABLET ORAL
Qty: 30 TABLET | Refills: 0 | Status: SHIPPED | OUTPATIENT
Start: 2022-09-26 | End: 2022-10-08

## 2022-09-26 RX ORDER — OXYCODONE HYDROCHLORIDE 5 MG/1
5 TABLET ORAL ONCE
Status: COMPLETED | OUTPATIENT
Start: 2022-09-26 | End: 2022-09-26

## 2022-09-26 RX ORDER — DIAZEPAM 2 MG/1
2 TABLET ORAL ONCE
Status: COMPLETED | OUTPATIENT
Start: 2022-09-26 | End: 2022-09-26

## 2022-09-26 RX ORDER — LIDOCAINE 4 G/G
1 PATCH TOPICAL DAILY
Status: DISCONTINUED | OUTPATIENT
Start: 2022-09-26 | End: 2022-09-27 | Stop reason: HOSPADM

## 2022-09-26 RX ORDER — LIDOCAINE 50 MG/G
1 PATCH TOPICAL EVERY 24 HOURS
Qty: 30 PATCH | Refills: 0 | Status: SHIPPED | OUTPATIENT
Start: 2022-09-26 | End: 2022-10-26

## 2022-09-26 RX ADMIN — DIAZEPAM 2 MG: 2 TABLET ORAL at 20:30

## 2022-09-26 RX ADMIN — OXYCODONE 5 MG: 5 TABLET ORAL at 22:33

## 2022-09-26 RX ADMIN — OXYCODONE 5 MG: 5 TABLET ORAL at 19:09

## 2022-09-26 ASSESSMENT — PAIN DESCRIPTION - FREQUENCY
FREQUENCY: CONTINUOUS
FREQUENCY: CONTINUOUS

## 2022-09-26 ASSESSMENT — PAIN SCALES - GENERAL
PAINLEVEL_OUTOF10: 6
PAINLEVEL_OUTOF10: 8

## 2022-09-26 ASSESSMENT — ENCOUNTER SYMPTOMS
ABDOMINAL PAIN: 0
NAUSEA: 0
COUGH: 0
SHORTNESS OF BREATH: 0
RHINORRHEA: 0
EYE PAIN: 0
DIARRHEA: 0
SORE THROAT: 0
BACK PAIN: 1
VOMITING: 0
CONSTIPATION: 0

## 2022-09-26 ASSESSMENT — PAIN DESCRIPTION - LOCATION
LOCATION: BACK
LOCATION: BACK

## 2022-09-26 ASSESSMENT — PAIN DESCRIPTION - ORIENTATION
ORIENTATION: MID;INNER;LEFT
ORIENTATION: MID;INNER;LEFT

## 2022-09-26 ASSESSMENT — PAIN DESCRIPTION - ONSET
ONSET: ON-GOING
ONSET: ON-GOING

## 2022-09-26 ASSESSMENT — PAIN DESCRIPTION - DESCRIPTORS
DESCRIPTORS: ACHING
DESCRIPTORS: ACHING

## 2022-09-26 NOTE — ED PROVIDER NOTES
[address] [facility]        Pt Name: Charity Hernandez  MRN: [id number]  [place] 1970  Date of evaluation: 9/26/2022  Provider: LILLIE Avalos - ELEAZAR  PCP: Fidelia Doyle MD  Note Started: 6:57 PM EDT       I have seen and evaluated this patient with my supervising physician Dr Nadine Davis       Chief Complaint   Patient presents with    Back Pain     Acute on chronic back pain. Being treated by ortho for same. Pt is experiencing loss of bladder control. Increased back pain         HISTORY OF PRESENT ILLNESS   (Location/Symptom, Timing/Onset, Context/Setting, Quality, Duration, Modifying Factors, Severity)  Note limiting factors. Chief Complaint: Benji Hernandez is a 47 y.o. male who presents to the ED for evaluation of acute on chronic back pain ongoing since Friday/Saturday. Patient has a history of L1 compression fracture in 2021 with a kyphoplasty in January 2022. Follows with [address] [place] [address] for this. Starting on Friday is having worsening pain, feeling that he is bent over, generalized fatigue, incontinence of urine. This initially happened Friday night. Patient thought he was just fatigued and slept deeply with the incontinence at nighttime but has had progressively worsening incontinence during the daytime with multiple episodes of urinary incontinence this morning. Also associated with shooting pain down the inside of the right leg. Pain is sharp and severe. No alleviating factors. Aggravated by movement. He is a recovering alcoholic with last drink in 2015. He denies being immunocompromise or a diabetic. His last spinal procedure was in the beginning of this year. No saddle anesthesia at this time. No fevers, infection or urinary procedures in the last week. Nursing Notes were all reviewed and agreed with or any disagreements were addressed in the HPI.     REVIEW OF SYSTEMS    (2-9 systems for level 4, 10 or more for level 5)     Review of Systems   Constitutional:  Positive for activity change and fatigue. Negative for chills, diaphoresis and fever. HENT:  Negative for congestion, rhinorrhea and sore throat. Eyes:  Negative for pain and visual disturbance. Respiratory:  Negative for cough and shortness of breath. Cardiovascular:  Negative for chest pain and leg swelling. Gastrointestinal:  Negative for abdominal pain, constipation, diarrhea, nausea and vomiting. Genitourinary:  Negative for frequency and hematuria. Incontinence   Musculoskeletal:  Positive for back pain. Negative for neck pain. Skin:  Negative for rash and wound. Neurological:  Negative for dizziness and light-headedness.      PAST MEDICAL HISTORY     Past Medical History:   Diagnosis Date    Alcoholism ([address]     last drink 2015    Allergic migraine with status migrainosus     Allergic rhinitis, seasonal     Anemia     Arthritis     right knee    Avughn's esophagus     Benign intracranial hypertension     Cancer (HCC)     renal     Carpal tunnel syndrome     Chronic pain     Depression     Ear infection 05/06/2022    GERD (gastroesophageal reflux disease)     Headache(784.0)     History of blood transfusion     History of tobacco use     Quit 7/2014    Migraine     chronic for 1 year    Morbid obesity ([address]     Movement disorder     Onychomycosis     Sleep apnea, obstructive     Severe uses cpap stop bang 6    Unspecified cerebral artery occlusion with cerebral infarction 2014    slight weakness in left arm    Wears dentures     full set    Wears glasses        SURGICAL HISTORY     Past Surgical History:   Procedure Laterality Date    ABDOMEN SURGERY      gastric bypass    ABDOMEN SURGERY  4-7-2016    repair of recurrent incisional hernia with mesh, removal of old mesh, bilateral component separation    ABDOMINAL EXPLORATION SURGERY  1/11/16    exp lap, lysis of adhesions, small bowel resection    CARPAL TUNNEL RELEASE      bilat    COLONOSCOPY N/A 5/31/2022    COLONOSCOPY POLYPECTOMY SNARE/COLD BIOPSY performed by Carl Wesley MD at [facility]      ENDOSCOPY, COLON, DIAGNOSTIC      EYE SURGERY      GASTRIC BYPASS SURGERY  Jan 2009    Has lost about 200 pounds. HERNIA REPAIR  3-    ventral    JOINT REPLACEMENT      KIDNEY REMOVAL Left 08/01/2018    KNEE ARTHROSCOPY Right 7/11/2013    Dr.Robert WaltersAdelina     KNEE ARTHROSCOPY Right 7/11/12    RIGHT KNEE ARTHROSCOPY WITH CHONDROPLASTY    KNEE SURGERY  July 2012    right, arthroscopy    KNEE SURGERY Right 2/18/2020    INCISION AND DRAINAGE RIGHT KNEE AND WOULD VAC PLACEMENT performed by Christopher Leone MD at [address] Right 2/26/2021    INCISION AND DRAINAGE OF RIGHT KNEE HEMATOMA performed by Debbie Skelton MD at [address] Right 3/5/2021    INCISION AND DRAINAGE AND CLOSURE RIGHT TOTAL KNEE performed by Debbie Skelton MD at [address] [place] [address]  2005    OTHER SURGICAL HISTORY Left 03/08/2016    CT biopsy ablasion left kidney    OTHER SURGICAL HISTORY  2016    small intestine 12 inch removed, 2 hernia surgeries, another surgery to drain infection from incision.      REVISION TOTAL KNEE ARTHROPLASTY Right 12/17/2019    RIGHT REVISION TIBIA TOTAL KNEE REPLACEMENT performed by Christopher Leone MD at [address] Right 1/22/2020    RIGHT KNEE IRRIGATION AND DEBRIDEMENT WITH POLY EXCHANGE performed by Bárbara Suggs MD at [address] Right 2/16/2021    RIGHT REMOVAL OF EXPLANT AND TOTAL KNEE REPLACEMENT performed by Debbie Skelton MD at [id number] [place] [address]  10/15/13    RIGHT    TOTAL KNEE ARTHROPLASTY Right 8/12/2020    RIGHT ARTHROPLASY  RESECTION WITH INSERTION OF SPACER performed by Christopher Leone MD at [address] [facility]  6-7-2016 UPPER GASTROINTESTINAL ENDOSCOPY  04/02/2018    UPPER GASTROINTESTINAL ENDOSCOPY N/A 3/24/2022    ESOPHAGOGASTRODUODENOSCOPY performed by Brian Moreno MD at [address] [facility]  5/31/2022    ESOPHAGOGASTRODUODENOSCOPY WITH BIOPSY performed by Du Rico MD at [address] [facility]       Previous Medications    ATORVASTATIN (LIPITOR) 40 MG TABLET    TAKE 1 TABLET BY MOUTH EVERYDAY AT BEDTIME    CALCIUM-VITAMIN D (OSCAL 500/200 D-3) 500-200 MG-UNIT PER TABLET    Take 1 tablet by mouth 2 times daily     CLOPIDOGREL (PLAVIX) 75 MG TABLET    TAKE 1 TABLET BY MOUTH EVERY DAY    FLUOXETINE (PROZAC) 20 MG CAPSULE    Take 1 capsule by mouth daily    GABAPENTIN (NEURONTIN) 600 MG TABLET    Take 600 mg by mouth 4 times daily. MULTIPLE VITAMIN TABS    Take 1 tablet by mouth daily     ONDANSETRON (ZOFRAN ODT) 4 MG DISINTEGRATING TABLET    Take 1 tablet by mouth every 8 hours as needed for Nausea    PANTOPRAZOLE (PROTONIX) 40 MG TABLET    TAKE 1 TABLET BY MOUTH TWICE A DAY    SILDENAFIL (REVATIO) 20 MG TABLET    Take 4 tablets by mouth as needed (30 min prior to intercourse)    TRAZODONE (DESYREL) 100 MG TABLET    Take 2 tablets by mouth nightly       ALLERGIES     Nsaids, Morphine, Prochlorperazine, Valtrex [valacyclovir hcl], Fentanyl, Morphine and related, and Tolmetin    FAMILYHISTORY       Family History   Problem Relation Age of Onset    Cancer Father         Lymphoma    Arthritis Mother     Heart Disease Maternal Uncle     Heart Disease Maternal Uncle     Diabetes Maternal Uncle         SOCIAL HISTORY       Social History     Socioeconomic History    Marital status:      Spouse name: Esteban ryan    Number of children: 2   Occupational History    Occupation: [employer] his son.    Tobacco Use    Smoking status: Former     Packs/day: 0.50     Years: 25.00     Pack years: 12.50     Types: Cigarettes     Start date: 1/1/1985     Quit date: 2021     Years since quittin.4    Smokeless tobacco: Never    Tobacco comments:     Patient vapes   Vaping Use    Vaping Use: Former    Substances: Nicotine, THC    Devices: Refillable tank, Pre-filled pod   Substance and Sexual Activity    Alcohol use: No     Alcohol/week: 0.0 standard drinks     Comment: last drink     Drug use: Yes     Types: Marijuana Esperanza Thomas)     Comment: medical [employer] card    Sexual activity: Yes     Partners: Female     Comment: wife Handy Tejeda    [place] Coma Scale  Eye Opening: Spontaneous  Best Verbal Response: Oriented  Best Motor Response: Obeys commands  Nohemy Coma Scale Score: 15        PHYSICAL EXAM    (up to 7 for level 4, 8 or more for level 5)     ED Triage Vitals [22 1828]   BP Temp Temp Source Heart Rate Resp SpO2 Height Weight   139/80 97.8 °F (36.6 °C) Tympanic 68 18 99 % -- --       Physical Exam  Vitals and nursing note reviewed. Constitutional:       General: He is not in acute distress. Appearance: Normal appearance. He is obese. He is ill-appearing. He is not diaphoretic. HENT:      Head: Normocephalic and atraumatic. Right Ear: External ear normal.      Left Ear: External ear normal.      Nose: Nose normal. No congestion or rhinorrhea. Mouth/Throat:      Mouth: Mucous membranes are moist.      Pharynx: Oropharynx is clear. No oropharyngeal exudate or posterior oropharyngeal erythema. Eyes:      General: No scleral icterus. Right eye: No discharge. Left eye: No discharge. Extraocular Movements: Extraocular movements intact. Conjunctiva/sclera: Conjunctivae normal.      Pupils: Pupils are equal, round, and reactive to light. Cardiovascular:      Rate and Rhythm: Normal rate and regular rhythm. Pulses: Normal pulses. Heart sounds: Normal heart sounds. No murmur heard. No friction rub. No gallop. Pulmonary:      Effort: Pulmonary effort is normal. No respiratory distress. Breath sounds: Normal breath sounds. No stridor. No wheezing, rhonchi or rales. Abdominal:      General: Abdomen is flat. Bowel sounds are normal. There is no distension. Palpations: Abdomen is soft. Tenderness: no abdominal tenderness There is no right CVA tenderness, left CVA tenderness or guarding. Musculoskeletal:         General: Tenderness present. No deformity. Normal range of motion. Cervical back: Normal range of motion and neck supple. No rigidity. Comments: TTP l psine   Lymphadenopathy:      Cervical: No cervical adenopathy. Skin:     General: Skin is warm and dry. Capillary Refill: Capillary refill takes less than 2 seconds. Coloration: Skin is not jaundiced or pale. Findings: No bruising or rash. Neurological:      General: No focal deficit present. Mental Status: He is alert and oriented to person, place, and time. Mental status is at baseline. Comments: · L1-L2 Cremasteric Reflex (inner thigh sensation)  · L2 Adduct Thigh (cross legs)  · L3 Extend Knee  · L4 Dorsiflex Ankle (Up)  · L5 Point Great Toe Up  · L2 L3-L4 Knee Reflex  · S1 Flex Knee  · S2 Plantarflex Toes  · S3, 4, 5 Groin, Perianal Sensation  Assessed and symmetrical bilaterally       Psychiatric:         Mood and Affect: Mood normal.         Behavior: Behavior normal.       DIAGNOSTIC RESULTS   LABS:    Labs Reviewed   CBC WITH AUTO DIFFERENTIAL - Abnormal; Notable for the following components:       Result Value    Hemoglobin 12.7 (*)     Hematocrit 38.8 (*)     All other components within normal limits   COMPREHENSIVE METABOLIC PANEL W/ REFLEX TO MG FOR LOW K - Abnormal; Notable for the following components:    BUN 6 (*)     All other components within normal limits   URINALYSIS WITH REFLEX TO CULTURE       When ordered, only abnormal lab results are displayed. All other labs were within normal range or not returned as of this dictation. EKG:  When ordered, EKG's are interpreted by the Emergency Department Physician in the absence of a cardiologist.  Please see their note for interpretation of EKG. RADIOLOGY:   Non-plain film images such as CT, Ultrasound and MRI are read by the radiologist. Blanco Felty radiographic images are visualized andpreliminarily interpreted by the  ED Provider with the below findings:        Interpretation perthe Radiologist below, if available at the time of this note:    MRI LUMBAR SPINE WO CONTRAST   Final Result   No significant change since prior CT of the abdomen and pelvis. Unchanged chronic superior endplate compression fracture of L1 with 40%   height loss and post kyphoplasty changes. At L2-L3, grade 1 retrolisthesis, mild to moderate canal stenosis and mild   bilateral neural foraminal narrowing and left paracentral disc protrusion. At L4-L5, grade 1 anterolisthesis. Mild bilateral neural foraminal narrowing. RECOMMENDATIONS:   Unavailable           No results found. PROCEDURES   Unless otherwise noted below, none     Procedures    CRITICAL CARE TIME   I Ruel CNP, am the primary clinician of record  I provided 15 minutes of non concurrent Critical Care time, excluding separately reportable procedures. There was a high probability of clinically significant/life threatening deterioration in the patient's condition which required my urgent intervention.   This time spent assessing, reassessing patient, chart review and discussing case with other providers      CONSULTS:  None      EMERGENCY DEPARTMENT COURSE and DIFFERENTIAL DIAGNOSIS/MDM:   Vitals:    Vitals:    09/26/22 1828 09/26/22 2110 09/26/22 2233   BP: 139/80 113/71 115/71   Pulse: 68 54 67   Resp: 18 17 16   Temp: 97.8 °F (36.6 °C) 97.8 °F (36.6 °C)    TempSrc: Tympanic     SpO2: 99% 100% 98%       Patient was given thefollowing medications:  Medications   lidocaine 4 % external patch 1 patch (1 patch TransDERmal Patch Applied 9/26/22 1909)   diazePAM (VALIUM) tablet 2 mg (2 mg Oral Given 9/26/22 2030)   oxyCODONE (ROXICODONE) immediate release tablet 5 mg (5 mg Oral Given 9/26/22 1909)   oxyCODONE (Marshia Horde) immediate release tablet 5 mg (5 mg Oral Given 9/26/22 2233)         Is this patient to be included in the SEP-1 Core Measure due to severe sepsis or septic shock? No   Exclusion criteria - the patient is NOT to be included for SEP-1 Core Measure due to: Infection is not suspected  Differential Diagnosis: Spinal Epidural Abscess, Vertebral Osteomyelitis, Cauda Equina Syndrome, Spinal Cord Compression, Conus Medullaris Syndrome, ruptured/dissecting Abdominal Aortic Aneurysm, Metastases to the back, Kidney Stone, Pyelonephritis, Fracture or dislocation, other    47 y.o. male presents with complaint of back pain. There is no tenderness to palpation in the axial skeleton. No history of trauma. Presentation not consistent with fracture or dislocation. Patient does not have active malignancy. Patient is not anticoagulated. Patient is fully neurovascularly intact. Given the incontinence and history of back surgery the red flag score is elevated as such MRI was ordered. Findings as above without evidence of cord compression or cauda equina syndrome. There are multiple chronic findings on the MRI. This can safely managed by his neurosurgeon. As such, will defer further management to his neurosurgeon outpatient. Patient symptoms will be controlled with a course of steroids and lidocaine patch. The patient already has opiates at home for pain control. He also has close follow-up with his neurosurgeon. Return precautions discussed with the patient. He is agreeable to return to the ED immediately should he have any worsening symptoms. We discussed smoking cessation for roughly 3 minutes. The patient is at low risk for mortality based on demographic, history and clinical factors.  Given the best available information and clinical assessment, I estimate the risk of hospitalization to be greater than risk of treatment at home. I have explained to the patient that the risk could rapidly change, given precautions for return and instructions. Explained to patient that the risk for mortality is low based on demographic, history and clinical factors. I discussed with patient the results of evaluation in the ED, diagnosis, care, and prognosis. The plan is to discharge to home. Patient is in agreement with plan and questions have been answered. I also discussed with patient the reasons which may require a return visit and the importance of follow-up care. The patient is well-appearing, nontoxic, and improved at the time of discharge. Patient agrees to call to arrange follow-up care as directed. Patient understands to return immediately for worsening/change in symptoms. I am the Primary Clinician of Record. FINAL IMPRESSION      1. Acute exacerbation of chronic low back pain    2. Smoker    3. Urinary incontinence, unspecified type          DISPOSITION/PLAN   DISPOSITION Decision To Discharge 09/26/2022 11:05:59 PM      PATIENT REFERREDTO:  [address] [place] [address] Jaswinderdrvivienne  [phone]  Call in 1 day      DISCHARGE MEDICATIONS:  New Prescriptions    LIDOCAINE (LIDODERM) 5 %    Place 1 patch onto the skin every 24 hours Place 1 patch onto the skin daily 12 hours on, 12 hours off. PREDNISONE (DELTASONE) 10 MG TABLET    Take 4 tablets by mouth daily for 3 days, THEN 3 tablets daily for 3 days, THEN 2 tablets daily for 3 days, THEN 1 tablet daily for 3 days. Take by oral route 4 tabs x 3 days, then 3 tabs x 3 days, then 2 tabs x 3 days, then 1 tab x 3 days, then discontinue. Take with food. Teja Yordy        DISCONTINUED MEDICATIONS:  Discontinued Medications    No medications on file              (Please note that portions ofthis note were completed with a voice recognition program.  Efforts were made to edit the dictations but occasionally words are mis-transcribed.)    LILLIE Velazquez CNP (electronically signed)             LILLIE Velazquez CNP  09/26/22 4063

## 2022-09-27 NOTE — ED PROVIDER NOTES
I PERSONALLY SAW THE PATIENT AND PERFORMED A SUBSTANTIVE PORTION OF THE VISIT INCLUDING ALL ASPECTS OF THE MEDICAL DECISION MAKING PROCESS. [address]      Pt Name: Concepción Okeefe  MRN: [id number]  [place] 1970  Date of evaluation: 9/26/2022  Provider: Gaby Chaidez MD    CHIEF COMPLAINT       Chief Complaint   Patient presents with    Back Pain     Acute on chronic back pain. Being treated by ortho for same. Pt is experiencing loss of bladder control. Increased back pain       HISTORY OF PRESENT ILLNESS    Concepción Okeefe is a 47 y.o. male who presents to the emergency department with back pain. Patient presents with acute on chronic lower back pain. History of L1 compression fracture with kyphoplasty in January 2022. Follows at [address] [place] [address]. Worsening pain. Has had an episode of incontinence of urine. 8 out of 10 achy pain. Worse with movement. Better with rest.  Has follow-up with [facility] in the next few days. No other associated symptoms. Nursing Notes were reviewed. Including nursing noted for FM, Surgical History, Past Medical History, Social History, vitals, and allergies; agree with all. REVIEW OF SYSTEMS       Review of Systems    Except as noted above the remainder of the review of systems was reviewed and negative.      PAST MEDICAL HISTORY     Past Medical History:   Diagnosis Date    Alcoholism ([facility] [address]     last drink 2015    Allergic migraine with status migrainosus     Allergic rhinitis, seasonal     Anemia     Arthritis     right knee    Vaughn's esophagus     Benign intracranial hypertension     Cancer (HCC)     renal     Carpal tunnel syndrome     Chronic pain     Depression     Ear infection 05/06/2022    GERD (gastroesophageal reflux disease)     Headache(784.0)     History of blood transfusion     History of tobacco use     Quit 7/2014    Migraine     chronic for 1 year    Morbid obesity calcium-vitamin D (OSCAL 500/200 D-3) 500-200 MG-UNIT per tablet Take 1 tablet by mouth 2 times daily Historical Med      Multiple Vitamin TABS Take 1 tablet by mouth daily              ALLERGIES     Nsaids, Morphine, Prochlorperazine, Valtrex [valacyclovir hcl], Fentanyl, Morphine and related, and Tolmetin    FAMILY HISTORY        Family History   Problem Relation Age of Onset    Cancer Father         Lymphoma    Arthritis Mother     Heart Disease Maternal Uncle     Heart Disease Maternal Uncle     Diabetes Maternal Uncle        SOCIAL HISTORY       Social History     Socioeconomic History    Marital status:      Spouse name: Kunal Woodson    Number of children: 2   Occupational History    Occupation: [employer], beSUCCESS his son. Tobacco Use    Smoking status: Former     Packs/day: 0.50     Years: 25.00     Pack years: 12.50     Types: Cigarettes     Start date: 1985     Quit date: 2021     Years since quittin.4    Smokeless tobacco: Never    Tobacco comments:     Patient vapes   Vaping Use    Vaping Use: Former    Substances: Nicotine, THC    Devices: Refillable tank, Pre-filled pod   Substance and Sexual Activity    Alcohol use: No     Alcohol/week: 0.0 standard drinks     Comment: last drink     Drug use: Yes     Types: Marijuana Maiermaurizio Mast)     Comment: medical [employer] card    Sexual activity: Yes     Partners: Female     Comment: wife [address]       ED Triage Vitals [22 1828]   BP Temp Temp Source Heart Rate Resp SpO2 Height Weight   139/80 97.8 °F (36.6 °C) Tympanic 68 18 99 % -- --       Physical Exam  Vitals and nursing note reviewed. Constitutional:       General: He is not in acute distress. Appearance: He is well-developed. He is not diaphoretic. HENT:      Head: Normocephalic and atraumatic. Eyes:      General:         Right eye: No discharge. Left eye: No discharge. Pupils: Pupils are equal, round, and reactive to light. Neck:      Thyroid: No thyromegaly. Trachea: No tracheal deviation. Cardiovascular:      Rate and Rhythm: Normal rate and regular rhythm. Heart sounds: No murmur heard. Pulmonary:      Breath sounds: No wheezing or rales. Chest:      Chest wall: No tenderness. Abdominal:      General: There is no distension. Palpations: Abdomen is soft. There is no mass. Tenderness: There is no abdominal tenderness. There is no guarding or rebound. Musculoskeletal:         General: No tenderness or deformity. Normal range of motion. Cervical back: Normal range of motion. Skin:     General: Skin is warm. Coloration: Skin is not pale. Findings: No erythema or rash. Neurological:      Mental Status: He is alert. Cranial Nerves: No cranial nerve deficit. Motor: No abnormal muscle tone. Coordination: Coordination normal.       DIAGNOSTIC RESULTS     RADIOLOGY:   Non-plain film images such as CT, Ultrasoundand MRI are read by the radiologist. Plain radiographic images are visualized and preliminarily interpreted by the emergency physician with the below findings:    Impression   No significant change since prior CT of the abdomen and pelvis. Unchanged chronic superior endplate compression fracture of L1 with 40%   height loss and post kyphoplasty changes. At L2-L3, grade 1 retrolisthesis, mild to moderate canal stenosis and mild   bilateral neural foraminal narrowing and left paracentral disc protrusion. At L4-L5, grade 1 anterolisthesis. Mild bilateral neural foraminal narrowing.        RECOMMENDATIONS:   Unavailable       ED BEDSIDE ULTRASOUND:   Performed by ED Physician - none    LABS:  Labs Reviewed   CBC WITH AUTO DIFFERENTIAL - Abnormal; Notable for the following components:       Result Value    Hemoglobin 12.7 (*)     Hematocrit 38.8 (*)     All other components within normal limits   COMPREHENSIVE METABOLIC PANEL W/ REFLEX TO MG FOR LOW K - Abnormal; Notable for the following components:    BUN 6 (*)     All other components within normal limits   URINALYSIS WITH REFLEX TO CULTURE       All other labs were withinnormal range or not returned as of this dictation. EMERGENCY DEPARTMENT COURSE and DIFFERENTIAL DIAGNOSIS/MDM:     PMH, Surgical Hx, FH, Social Hx reviewed by myself (ETOH usage, Tobacco usage, Drug usage reviewed by myself, no pertinent Hx)- No Pertinent Hx     Old records were reviewed by me     MDM 80-year-old with acute on chronic lower back pain. MRI shows no evidence of cord compression. Steroids initiated. We will follow-up with spine in the next 1 to 2 days. Strict return precautions discussed. Labs-   Diagnostic findings  Medications  Consults  Disposition  I estimate there is LOW risk for Sepsis, MI, Stroke, Tamponade, PTX, Toxicity or other life threatening etiology thus I consider the discharge disposition reasonable. The patient is at low risk for mortality based on demographic, history and clinical factors. Given the best available information and clinical assessment, I estimate the risk of hospitalization to be greater than risk of treatment at home. I have explained to the patient that the risk could rapidly change, given precautions for return and instructions. Explained to patient that the risk for mortality is low based on demographic, history and clinical factors. I discussed with patient the results of evaluation in the ED, diagnosis, care, and prognosis. The plan is to discharge to home. Patient is in agreement with plan and questions have been answered. I also discussed with patient the reasons which may require a return visit and the importance of follow-up care. The patient is well-appearing, nontoxic, and improved at the time of discharge. Patient agrees to call to arrange follow-up care as directed. Patient understands to return immediately for worsening/change in symptoms.      I PERSONALLY SAW THE PATIENT AND PERFORMED A SUBSTANTIVE PORTION OF THE VISIT INCLUDING ALL ASPECTS OF THE MEDICAL DECISION MAKING PROCESS. The primary clinician of record [facility] [employer]   Total Critical Caretime was 21 minutes, excluding separately reportable procedures. There was a high probability of clinically significant/life threatening deterioration in the patient's condition which required my urgent intervention. CRITICAL CARE  I personally saw the patient and independently provided 21 minutes of non-concurrent critical care out of the total shared critical care time provided. This excludes seperately billable procedures. Critical care time was provided for patient as above that required close evaluation and/or intervention with concern for potential patient decompensation. PROCEDURES:  Unlessotherwise noted below, none    FINAL IMPRESSION      1. Acute exacerbation of chronic low back pain    2. Smoker    3. Urinary incontinence, unspecified type          DISPOSITION/PLAN   DISPOSITION Decision To Discharge 09/26/2022 11:05:59 PM    PATIENT REFERRED TO:  [address] [place] [address] [place] [address]  [phone]  Call in 1 day        DISCHARGE MEDICATIONS:  Discharge Medication List as of 9/26/2022 11:09 PM        START taking these medications    Details   predniSONE (DELTASONE) 10 MG tablet Take 4 tablets by mouth daily for 3 days, THEN 3 tablets daily for 3 days, THEN 2 tablets daily for 3 days, THEN 1 tablet daily for 3 days. Take by oral route 4 tabs x 3 days, then 3 tabs x 3 days, then 2 tabs x 3 days, then 1 tab x 3 days, then disconti nue. Take with food. ., Disp-30 tablet, R-0Normal      lidocaine (LIDODERM) 5 % Place 1 patch onto the skin every 24 hours Place 1 patch onto the skin daily 12 hours on, 12 hours off., Disp-30 patch, R-0Normal                (Please note that portions ofthis note were completed with a voice recognition program.  Efforts were made to edit the dictations but occasionally words are mis-transcribed.)    Diane Moreno MD(electronically signed)  Attending Emergency Physician          Diane Moreno MD  09/27/22 1518

## 2022-09-27 NOTE — DISCHARGE INSTRUCTIONS
Please call your surgeon in [address] first thing tomorrow morning and schedule close follow-up within the next 2 to 3 days.   Return to ED for any worsening symptoms as we discussed

## 2022-09-28 ENCOUNTER — CARE COORDINATION (OUTPATIENT)
Dept: CARE COORDINATION | Age: 52
End: 2022-09-28

## 2022-09-28 NOTE — CARE COORDINATION
[facility] ED Follow up Call    Reason for ED visit:  Back Pain  Status:     not changed    Did you call your PCP prior to going to the ED? No      Did you receive a discharge instructions from the Emergency Room? Yes  Review of Instructions:     Understands what to report/when to return?:  Yes   Understands discharge instructions?:  Yes   Following discharge instructions?:  Yes   If not why? N/A    Are there any new complaints of pain? No  New Pain Meds? No    Constipation prophylaxis needed? No    If you have a wound is the dressing clean, dry, and intact? N/A  Understands wound care regimen? N/A    Are there any other complaints/concerns that you wish to tell your provider? N/A    FU appts/Provider:    Future Appointments   Date Time Provider Timothy Beal   10/3/2022  7:45 AM SCHEDULE, [facility] REMOTE TRANSMISSION [facility]   11/7/2022  8:30 AM SCHEDULE, [facility] REMOTE TRANSMISSION [facility]   12/12/2022  8:30 AM SCHEDULE, [facility] REMOTE TRANSMISSION [facility]   3/21/2023 10:00 AM Harris De La Vega MD [facility]           New Medications?:   No      Medication Reconciliation by phone - No  Understands Medications? Patient did not reconcile meds with ACM on phone   Taking Medications? Yes  Can you swallow your pills? Yes    Any further needs in the home i.e. Equipment?   No    Link to services in community?:  N/A   Which services:  N/A

## 2022-09-29 ENCOUNTER — HOSPITAL ENCOUNTER (EMERGENCY)
Age: 52
Discharge: HOME | End: 2022-09-29
Payer: COMMERCIAL

## 2022-09-29 VITALS
OXYGEN SATURATION: 100 % | SYSTOLIC BLOOD PRESSURE: 114 MMHG | HEART RATE: 60 BPM | WEIGHT: 205 LBS | TEMPERATURE: 98.4 F | RESPIRATION RATE: 16 BRPM | BODY MASS INDEX: 27.8 KG/M2 | DIASTOLIC BLOOD PRESSURE: 72 MMHG

## 2022-09-29 DIAGNOSIS — G89.29 ACUTE EXACERBATION OF CHRONIC LOW BACK PAIN: Primary | ICD-10-CM

## 2022-09-29 DIAGNOSIS — M79.604 RIGHT LEG PAIN: ICD-10-CM

## 2022-09-29 DIAGNOSIS — M54.50 ACUTE EXACERBATION OF CHRONIC LOW BACK PAIN: Primary | ICD-10-CM

## 2022-09-29 PROCEDURE — 99284 EMERGENCY DEPT VISIT MOD MDM: CPT

## 2022-09-29 PROCEDURE — 96372 THER/PROPH/DIAG INJ SC/IM: CPT

## 2022-09-29 PROCEDURE — 6360000002 HC RX W HCPCS: Performed by: PHYSICIAN ASSISTANT

## 2022-09-29 RX ORDER — OXYCODONE HYDROCHLORIDE AND ACETAMINOPHEN 5; 325 MG/1; MG/1
1 TABLET ORAL EVERY 6 HOURS PRN
Qty: 12 TABLET | Refills: 0 | Status: SHIPPED | OUTPATIENT
Start: 2022-09-29 | End: 2022-09-29 | Stop reason: SDUPTHER

## 2022-09-29 RX ORDER — OXYCODONE HYDROCHLORIDE AND ACETAMINOPHEN 5; 325 MG/1; MG/1
1 TABLET ORAL EVERY 6 HOURS PRN
Qty: 12 TABLET | Refills: 0 | Status: SHIPPED | OUTPATIENT
Start: 2022-09-29 | End: 2022-10-04

## 2022-09-29 RX ADMIN — HYDROMORPHONE HYDROCHLORIDE 2 MG: 1 INJECTION, SOLUTION INTRAMUSCULAR; INTRAVENOUS; SUBCUTANEOUS at 17:27

## 2022-09-29 ASSESSMENT — PAIN SCALES - GENERAL
PAINLEVEL_OUTOF10: 10
PAINLEVEL_OUTOF10: 10

## 2022-09-29 NOTE — ED PROVIDER NOTES
[address] [facility]        Pt Name: Bruno Crawford  MRN: [id number]  [place] 1970  Date of evaluation: 9/29/2022  Provider: JEMAL Trimble  PCP: Maame Lucia MD  Note Started: 7:10 PM EDT     The ED Attending Physician was available for consultation but did not see or evaluate this patient. CHIEF COMPLAINT       Chief Complaint   Patient presents with    Back Pain     Pt has chronic back pt is scheduled for surgery states \" i'm in pain and I can't walk\" pt did transfer from chair to wheel chair \"        HISTORY OF PRESENT ILLNESS   (Location, Timing/Onset, Context/Setting, Quality, Duration, Modifying Factors, Severity, Associated Signs and Symptoms)  Note limiting factors. Bruno Crawford is a 47 y.o. male who presents complaining of pain in the lower back and right leg. He was seen in this ED 3 days ago, with acute on chronic back pain, with history of kyphoplasty not long ago. He was complaining of some urinary incontinence at that time, so MRI without contrast was performed, showing no changes from prior imaging, no evidence of spinal cord impingement. Patient says he had been taking some pain medication but ran out of it, though he is still taking his steroid treatments and using lidocaine patches. He says the pain is in his back but now his right leg as well, going down the inside of the leg, and it is unbearable. He says it was so bad today that he fell to the ground, onto his right side, does not think he injured himself, no head trauma. He denies numbness in the leg. Says he can barely stand and walk. Nursing Notes were all reviewed and agreed with or any disagreements were addressed in the HPI. REVIEW OF SYSTEMS    (2-9 systems for level 4, 10 or more for level 5)     Positives and pertinent negatives as per HPI.      PAST MEDICAL HISTORY     Past Medical History:   Diagnosis Date    Alcoholism ([facility] [address] last drink 2015    Allergic migraine with status migrainosus     Allergic rhinitis, seasonal     Anemia     Arthritis     right knee    Vaughn's esophagus     Benign intracranial hypertension     Cancer (HCC)     renal     Carpal tunnel syndrome     Chronic pain     Depression     Ear infection 05/06/2022    GERD (gastroesophageal reflux disease)     Headache(784.0)     History of blood transfusion     History of tobacco use     Quit 7/2014    Migraine     chronic for 1 year    Morbid obesity ([address]     Movement disorder     Onychomycosis     Sleep apnea, obstructive     Severe uses cpap stop bang 6    Unspecified cerebral artery occlusion with cerebral infarction 2014    slight weakness in left arm    Wears dentures     full set    Wears glasses        SURGICAL HISTORY     Past Surgical History:   Procedure Laterality Date    ABDOMEN SURGERY      gastric bypass    ABDOMEN SURGERY  4-7-2016    repair of recurrent incisional hernia with mesh, removal of old mesh, bilateral component separation    ABDOMINAL EXPLORATION SURGERY  1/11/16    exp lap, lysis of adhesions, small bowel resection    CARPAL TUNNEL RELEASE      bilat    COLONOSCOPY N/A 5/31/2022    COLONOSCOPY POLYPECTOMY SNARE/COLD BIOPSY performed by Juliana Jenkins MD at [address], COLON, DIAGNOSTIC      EYE SURGERY      GASTRIC BYPASS SURGERY  Jan 2009    Has lost about 200 pounds.     HERNIA REPAIR  3-    ventral    JOINT REPLACEMENT      KIDNEY REMOVAL Left 08/01/2018    KNEE ARTHROSCOPY Right 7/11/2013    Dr.Robert WaltersAdelina     KNEE ARTHROSCOPY Right 7/11/12    RIGHT KNEE ARTHROSCOPY WITH CHONDROPLASTY    KNEE SURGERY  July 2012    right, arthroscopy    KNEE SURGERY Right 2/18/2020    INCISION AND DRAINAGE RIGHT KNEE AND WOULD VAC PLACEMENT performed by Omayra Alcaraz MD at [place] [address] Right 2/26/2021    INCISION AND DRAINAGE OF RIGHT KNEE HEMATOMA performed by Gaston Viveros MD at [facility] OR    KNEE SURGERY Right 3/5/2021    INCISION AND DRAINAGE AND CLOSURE RIGHT TOTAL KNEE performed by Lowell Garnica MD at [address] [place] [address]  2005    OTHER SURGICAL HISTORY Left 03/08/2016    CT biopsy ablasion left kidney    OTHER SURGICAL HISTORY  2016    small intestine 12 inch removed, 2 hernia surgeries, another surgery to drain infection from incision. REVISION TOTAL KNEE ARTHROPLASTY Right 12/17/2019    RIGHT REVISION TIBIA TOTAL KNEE REPLACEMENT performed by Gem Brewer MD at [place] [address] Right 1/22/2020    RIGHT KNEE IRRIGATION AND DEBRIDEMENT WITH POLY EXCHANGE performed by Sydnie Brower MD at [place] [address] Right 2/16/2021    RIGHT REMOVAL OF EXPLANT AND TOTAL KNEE REPLACEMENT performed by Lowell Garnica MD at [address] [place] [address]  10/15/13    RIGHT    TOTAL KNEE ARTHROPLASTY Right 8/12/2020    RIGHT ARTHROPLASY  RESECTION WITH INSERTION OF SPACER performed by Gem Brewer MD at [address] [place] [address]  6-7-2016    UPPER GASTROINTESTINAL ENDOSCOPY  04/02/2018    UPPER GASTROINTESTINAL ENDOSCOPY N/A 3/24/2022    ESOPHAGOGASTRODUODENOSCOPY performed by Kareen Munoz MD at [place] [address]  5/31/2022    ESOPHAGOGASTRODUODENOSCOPY WITH BIOPSY performed by Nish Browne MD at [facility]. 192       Previous Medications    ATORVASTATIN (LIPITOR) 40 MG TABLET    TAKE 1 TABLET BY MOUTH EVERYDAY AT BEDTIME    CALCIUM-VITAMIN D (OSCAL 500/200 D-3) 500-200 MG-UNIT PER TABLET    Take 1 tablet by mouth 2 times daily     CLOPIDOGREL (PLAVIX) 75 MG TABLET    TAKE 1 TABLET BY MOUTH EVERY DAY    FLUOXETINE (PROZAC) 20 MG CAPSULE    Take 1 capsule by mouth daily    GABAPENTIN (NEURONTIN) 600 MG TABLET    Take 600 mg by mouth 4 times daily.      LIDOCAINE (LIDODERM) 5 %    Place 1 patch onto the skin every 24 hours Place 1 patch onto the skin daily 12 hours on, 12 hours off. MULTIPLE VITAMIN TABS    Take 1 tablet by mouth daily     ONDANSETRON (ZOFRAN ODT) 4 MG DISINTEGRATING TABLET    Take 1 tablet by mouth every 8 hours as needed for Nausea    PANTOPRAZOLE (PROTONIX) 40 MG TABLET    TAKE 1 TABLET BY MOUTH TWICE A DAY    PREDNISONE (DELTASONE) 10 MG TABLET    Take 4 tablets by mouth daily for 3 days, THEN 3 tablets daily for 3 days, THEN 2 tablets daily for 3 days, THEN 1 tablet daily for 3 days. Take by oral route 4 tabs x 3 days, then 3 tabs x 3 days, then 2 tabs x 3 days, then 1 tab x 3 days, then discontinue. Take with food. Wilkes Colon     SILDENAFIL (REVATIO) 20 MG TABLET    Take 4 tablets by mouth as needed (30 min prior to intercourse)    TRAZODONE (DESYREL) 100 MG TABLET    Take 2 tablets by mouth nightly       ALLERGIES     Nsaids, Morphine, Prochlorperazine, Valtrex [valacyclovir hcl], Fentanyl, Morphine and related, and Tolmetin    FAMILYHISTORY       Family History   Problem Relation Age of Onset    Cancer Father         Lymphoma    Arthritis Mother     Heart Disease Maternal Uncle     Heart Disease Maternal Uncle     Diabetes Maternal Uncle         SOCIAL HISTORY       Social History     Tobacco Use    Smoking status: Former     Packs/day: 0.50     Years: 25.00     Pack years: 12.50     Types: Cigarettes     Start date: 1985     Quit date: 2021     Years since quittin.4    Smokeless tobacco: Never    Tobacco comments:     Patient vapes   Vaping Use    Vaping Use: Former    Substances: Nicotine, THC    Devices: Refillable tank, Pre-filled pod   Substance Use Topics    Alcohol use: No     Alcohol/week: 0.0 standard drinks     Comment: last drink     Drug use: Yes     Types: Marijuana Laney Pop)     Comment: medical luke card       SCREENINGS    [place] Coma Scale  Eye Opening: Spontaneous  Best Verbal Response: Oriented  Best Motor Response: Obeys commands  Nohemy Coma Scale Score: 15      PHYSICAL EXAM (up to 7 for level 4, 8 or more for level 5)     ED Triage Vitals [09/29/22 1645]   BP Temp Temp Source Heart Rate Resp SpO2 Height Weight   114/72 98.4 °F (36.9 °C) Oral 60 16 100 % -- 205 lb (93 kg)       Physical Exam  Vitals and nursing note reviewed. Constitutional:       General: He is not in acute distress. Appearance: Normal appearance. He is not ill-appearing. HENT:      Head: Normocephalic and atraumatic. Nose: Nose normal.   Eyes:      General:         Right eye: No discharge. Left eye: No discharge. Pulmonary:      Effort: Pulmonary effort is normal. No respiratory distress. Musculoskeletal:         General: Normal range of motion. Cervical back: Normal range of motion and neck supple. No rigidity or bony tenderness. No spinous process tenderness. Normal range of motion. Lumbar back: Bony tenderness (diffuse) present. No signs of trauma. Skin:     General: Skin is warm and dry. Neurological:      General: No focal deficit present. Mental Status: He is alert and oriented to person, place, and time. Motor: Motor function is intact. No weakness or abnormal muscle tone. Coordination: Coordination is intact. Gait: Gait normal.   Psychiatric:         Mood and Affect: Mood normal.         Behavior: Behavior normal.       DIAGNOSTIC RESULTS   LABS:    Labs Reviewed - No data to display    When ordered only abnormal lab results are displayed. All other labs were within normal range or not returned as of this dictation. EKG: When ordered, EKG's are interpreted by the Emergency Department Physician in the absence of a cardiologist.  Please see their note for interpretation of EKG. RADIOLOGY:   All images such as plain radiographs, CT, Ultrasound and MRI are interpreted by a radiologist. Some images are visualized and preliminarily interpreted by me and/or the ED attending physician.     Interpretation per the radiologist below, if available at the time of this note:    No orders to display       CONSULTS:  None    PROCEDURES   Unless otherwise noted below, none. Procedures    EMERGENCY DEPARTMENT COURSE and DIFFERENTIAL DIAGNOSIS/MDM:   Vitals:    Vitals:    09/29/22 1645   BP: 114/72   Pulse: 60   Resp: 16   Temp: 98.4 °F (36.9 °C)   TempSrc: Oral   SpO2: 100%   Weight: 205 lb (93 kg)       Patient was given the following medications:  Medications   HYDROmorphone (DILAUDID) injection 2 mg (2 mg IntraMUSCular Given 9/29/22 1727)           Is this patient to be included in the SEP-1 Core Measure due to severe sepsis or septic shock? No   Exclusion criteria - the patient is NOT to be included for SEP-1 Core Measure due to: Infection is not suspected    Patient's had no neurological deficits on exam, no new injury. MRI from a few days ago showed no acute findings. Suspicion for any acute throughout the spinal cord was very low. Suspicion for DVT very low. Patient was given pain medication in the ED and reported some improvement. He says he had an appointment with [address] [place] [address] back specialists earlier today that was postponed. No indication for hospital admission. Patient be discharged with prescription for a course of pain medication advised to call me for clinic again to discuss his follow-up. The patient verbalized understanding and agreement with this plan of care. The patient was advised to return to the emergency department if symptoms should significantly worsen or if new and concerning symptoms should appear. I estimate there is LOW risk for ABDOMINAL AORTIC ANEURYSM, SPINAL MENINGITIS, EPIDURAL ABSCESS, CAUDA EQUINA SYNDROME, EPIDURAL MASS LESION, or CORD COMPRESSION, thus I consider the discharge disposition reasonable. CRITICAL CARE TIME   None    FINAL IMPRESSION      1. Acute exacerbation of chronic low back pain    2.  Right leg pain          DISPOSITION/PLAN   DISPOSITION Decision To Discharge 09/29/2022 07:23:11 PM      PATIENT REFERRED TO:  [facility]  [address] [place] [address]  [phone]    Call   For follow-up care    DISCHARGE MEDICATIONS:  New Prescriptions    OXYCODONE-ACETAMINOPHEN (PERCOCET) 5-325 MG PER TABLET    Take 1 tablet by mouth every 6 hours as needed for Pain for up to 5 days.        DISCONTINUED MEDICATIONS:  Discontinued Medications    No medications on file            (Please note that portions of this note were completed with a voice recognition program.  Efforts were made to edit the dictations but occasionally words are mis-transcribed.)    JEMAL Cabrera (electronically signed)       Idania Shepard Alaalizama  09/29/22 1926

## 2022-09-29 NOTE — ED NOTES
D/C: Order noted for d/c. Pt confirmed d/c paperwork  have correct name. Discharge and education instructions reviewed with patient. Teach-back successful. Pt verbalized understanding and signed d/c papers. Pt denied questions at this time. No acute distress noted. Patient instructed to follow-up as noted - return to emergency department if symptoms worsen. Patient verbalized understanding. Discharged per EDMD with discharge instructions. Pt discharged  to private vehicle. Patient stable upon departure. Thanked patient for choosing [facility]) for care.           Eusebio Hansen RN  09/29/22 [id number]

## 2022-10-02 ENCOUNTER — HOSPITAL ENCOUNTER (OUTPATIENT)
Dept: MRI IMAGING | Age: 52
Discharge: HOME | End: 2022-10-02
Payer: COMMERCIAL

## 2022-10-02 DIAGNOSIS — M80.08XS AGE-RELATED OSTEOPOROSIS WITH CURRENT PATHOLOGICAL FRACTURE OF VERTEBRA, SEQUELA: ICD-10-CM

## 2022-10-02 PROCEDURE — A9576 INJ PROHANCE MULTIPACK: HCPCS | Performed by: SINGLE SPECIALTY

## 2022-10-02 PROCEDURE — 72157 MRI CHEST SPINE W/O & W/DYE: CPT

## 2022-10-02 PROCEDURE — 6360000004 HC RX CONTRAST MEDICATION: Performed by: SINGLE SPECIALTY

## 2022-10-02 RX ADMIN — GADOTERIDOL 18 ML: 279.3 INJECTION, SOLUTION INTRAVENOUS at 15:31

## 2022-10-03 ENCOUNTER — NURSE ONLY (OUTPATIENT)
Dept: CARDIOLOGY CLINIC | Age: 52
End: 2022-10-03
Payer: COMMERCIAL

## 2022-10-03 DIAGNOSIS — G45.9 TIA (TRANSIENT ISCHEMIC ATTACK): Primary | ICD-10-CM

## 2022-10-03 DIAGNOSIS — Z95.818 IMPLANTABLE LOOP RECORDER PRESENT: ICD-10-CM

## 2022-10-03 PROCEDURE — G2066 INTER DEVC REMOTE 30D: HCPCS | Performed by: INTERNAL MEDICINE

## 2022-10-03 PROCEDURE — 93298 REM INTERROG DEV EVAL SCRMS: CPT | Performed by: INTERNAL MEDICINE

## 2022-10-03 NOTE — PROGRESS NOTES
ILR for syncope and CVA. Remote transmission received from patient's monitor at home. Remote Linq report shows no arrhythmias/ or events. EP physician to review. See PACEART report under Cardiology tab. Will continue to monitor remotely. (End of 31-day monitoring period 10/3/22).

## 2022-10-05 ENCOUNTER — HOSPITAL ENCOUNTER (OUTPATIENT)
Dept: GENERAL RADIOLOGY | Age: 52
Discharge: HOME | End: 2022-10-05
Payer: COMMERCIAL

## 2022-10-05 DIAGNOSIS — M80.08XA CRUSH FRACTURE OF VERTEBRA DUE TO OSTEOPOROSIS, INITIAL ENCOUNTER (HCC): ICD-10-CM

## 2022-10-05 PROCEDURE — 77080 DXA BONE DENSITY AXIAL: CPT

## 2022-10-11 ENCOUNTER — TELEPHONE (OUTPATIENT)
Dept: CARDIOLOGY CLINIC | Age: 52
End: 2022-10-11

## 2022-10-11 NOTE — TELEPHONE ENCOUNTER
Returned call to patient and he states that around his loop is \"very angry\". He thought it was an infected hair but it is not getting better. I offered for him to come to the office to have it checked. He is going to send a picture through [address] ILR implanted 10/2021.

## 2022-10-11 NOTE — TELEPHONE ENCOUNTER
Amara Whitlock called in this afternoon, he states where the loop recorder was put in he has a big purple welt/ sore at that spot. He noticed it about a week ago. He would like a call concerning this. He can be reached at [phone].

## 2022-10-12 ENCOUNTER — NURSE ONLY (OUTPATIENT)
Dept: CARDIOLOGY CLINIC | Age: 52
End: 2022-10-12

## 2022-10-12 NOTE — PROGRESS NOTES
Pt seen for site check. ILR is one year old. Pt noticed what he thought was an ingrown hair hear the medial end of his loop recorder about a week ago. The site has not healed so he became concerned. No drainage and no open areas. He does have a small reddened area, no drainage, no opening. Appears to be superficial and not from the ILR. Advised him to cleanse the area with antibacterial soap twice a day and leave open to air. Should call if it get worse or if he notices an opening.

## 2022-10-19 NOTE — CARE COORDINATION
aforementioned/reviewed and agreement with the following  Plan:  Pt will review/follow recommendations per provider pending review w/his phone review w/provider today  Pt will be further monitored by COVID Loop Team based on severity of symptoms and risk factors. BMP/CBC/PT/PTT/EKG/Results in MD note/COVID-19

## 2022-10-24 ENCOUNTER — HOSPITAL ENCOUNTER (OUTPATIENT)
Dept: PHYSICAL THERAPY | Age: 52
Setting detail: THERAPIES SERIES
Discharge: HOME | End: 2022-10-24
Payer: COMMERCIAL

## 2022-10-24 PROCEDURE — 97162 PT EVAL MOD COMPLEX 30 MIN: CPT

## 2022-10-24 PROCEDURE — 97530 THERAPEUTIC ACTIVITIES: CPT

## 2022-10-24 NOTE — FLOWSHEET NOTE
[facility]  [address] [facility]  Phone: [phone]   Fax:     [phone]    Physical Therapy Treatment Note/ Progress Report:     Date:  10/24/2022    Patient Name:  Alvina Scott    :  1970  MRN: [id number]    Pertinent Medical History:      Medical/Treatment Diagnosis Information:  Medical Diagnosis: Compression fracture of L1 lumbar vertebra ([address] [N38.859Z]  Treatment Diagnosis: back pain, limited mobility, activity tolerance, gait dysfunction, bladder dysfunction    Insurance/Certification information:     Physician Information:  Arvind Quinn MD  Plan of care signed (Y/N):     Date of Patient follow up with Physician:      Progress Report: []  Yes  [x]  No     Date Range for reporting period:  Beginning: 10/24/2022  Ending:    Progress report due (10 Rx/or 30 days whichever is less): #24     Recertification due (POC duration/ or 90 days whichever is less):     Visit # POC/ Insurance Allowable Auth Needed   1 /  Chivo Weinstein []Yes    []No     Functional Outcomes Measure:   Date Assessed: at eval  Test:FOTO  Score:    Pain level:  /10     History of Injury: Pt here for evaluation for c/o back and R leg pain. He reports a long hx of a chronic pain, but about a year ago, he had a fall and he sustained compression fracture of L1 and had kyphoplasty around March. That seemed to help with pain for a month or two, but then things started to get bad. He now reports for the past 4-6 weeks that he had entire RLE pain numbness into his foot. He also reports along that same time frame that he has many instances at night where he loses control of his bladder, and also has difficulty raising arms overhead which apparently is somewhat new. He has been very diligent about setting an alarm to go to the restroom every 2 hours to prevent accidents during the day.  The past few weeks, he reports increased episodes of bed wetting at night. He last saw spine surgeon a few weeks ago regarding bed wetting and it was recommended to try epidural and possibly surgery. He apparently was denied epidurals and was told to try PT first by his insurance. He is ambulating w/ cane, but states his gait has worsened lately and he has considered using a walker at times. After initial injury, he states compression fracture was around 10% compressed, now he states its around 40% and he was told when it reaches 50%, he has to have surgery due to pressure on spinal cord. Next f/u w/ surgeon isn't till middle of next month. He also has osteoporosis and he attributes that to malnutrition from his bypass surgery which was many years ago. He has tried taking calcium/ D supplements and also tried a Rx medicine for osteoporosis, but per his recent Dexa scans, he states his numbers didn't change much. He is going to follow up with gastro doctor next month sometime. He is wearing a TLSO and was told to wear it as much as he can. He was wearing a lumbar support after surgery, but now has this TLSO. He walks w/ a cane. At his last f/u w/ surgeon, he had just started to have severe pain and also loss of bladder control; per his report this has worsened a great deal since that time. He has tried to contact their office, but only got thru to his . *RLE pain: >75% of the day. Relieved only partially w/ pain meds. Worse in the morning; sleeps in his TLSO due to following instructions to wear it as much as possible. 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 *future: discuss diet (vegan?)  *PT recommended f/u w/ PCP re: bone density and cont'd worsening bladder dysfunction/ UE/LE strength deficits     Imaging:  Impression       1. Mild nonspecific marrow edema involving the base of the T7 spinous process. No correlate is seen on CT chest dated 3/21/2020. Consider CT thoracic spine to exclude a nondisplaced fracture. 2.   Chronic T5, T8, L1 compression fractures. 3.  Thoracic spondylosis resulting in mild T7-8 and T9-10 spinal canal stenoses and mild left T6-7 and T7-8 neural foraminal narrowing. SUBJECTIVE:  See eval    OBJECTIVE:   Observation:   Test measurements:      RESTRICTIONS/PRECAUTIONS: L1 kyphoplasty, chronic pain, worsening gait/ UE/LE weakness    Exercises/Interventions:     Therapeutic Ex (04993)   Min: Resistance/Reps Notes/Cues        Sit/ stand >    Seated row >         scaption >                             Therapeutic Activity (20981) Min:      Pt edu Pathology, potential concerning factors; worsening gait/ bladder dysfxn, upper/ lower quarter weakness, may consider FWW to use w/ ambulation to optimize gait. NMR re-education (10006)   Min:     Posture education Brief review to avoid slouched posture and use posterior chain musculature to sit upright                   Manual Intervention (30947) Min:                Modalities  Min:             Other Therapeutic Activities:  Pt was educated on PT POC, Diagnosis, Prognosis, pathomechanics as well as frequency and duration of scheduling future physical therapy appointments. Time was also taken on this day to answer all patient questions and participation in PT. Reviewed appointment policy in detail with patient and patient verbalized understanding. Home Exercise Program: Patient instructed in the following for HEP:   . Patient verbalized/demonstrated understanding and was issued written handout. Therapeutic Exercise and NMR EXR  [x] (74015) Provided verbal/tactile cueing for activities related to strengthening, flexibility, endurance, ROM  for improvements in proximal hip and core control with self care, mobility, lifting and ambulation.   [x] (72808) Provided verbal/tactile cueing for activities related to improving balance, coordination, kinesthetic sense, posture, motor skill, proprioception  to assist with core control in self care, mobility, lifting, and ambulation. Therapeutic Activities:    [x] (67015 or 40185) Provided verbal/tactile cueing for activities related to improving balance, coordination, kinesthetic sense, posture, motor skill, proprioception and motor activation to allow for proper function  with self care and ADLs  [x] (86972) Provided training and instruction to the patient for proper core and proximal hip recruitment and positioning with ambulation re-education     Home Exercise Program:    [x] (27784) Reviewed/Progressed HEP activities related to strengthening, flexibility, endurance, ROM of core, proximal hip and LE for functional self-care, mobility, lifting and ambulation   [x] (58967) Reviewed/Progressed HEP activities related to improving balance, coordination, kinesthetic sense, posture, motor skill, proprioception of core, proximal hip and LE for self care, mobility, lifting, and ambulation      Manual Treatments:  PROM / STM / Oscillations-Mobs:  G-I, II, III, IV (PA's, Inf., Post.)  [x] (04698) Provided manual therapy to mobilize proximal hip and LS spine soft tissue/joints for the purpose of modulating pain, promoting relaxation,  increasing ROM, reducing/eliminating soft tissue swelling/inflammation/restriction, improving soft tissue extensibility and allowing for proper ROM for normal function with self care, mobility, lifting and ambulation.        Approval Dates:  CPT Code Units Approved Units Used  Date Updated:                     Charges:  Timed Code Treatment Minutes: 25   Total Treatment Minutes: 50     [] EVAL (LOW) 20933 (typically 20 minutes face-to-face)  [x] EVAL (MOD) 38281 (typically 30 minutes face-to-face)  [] EVAL (HIGH) 44703 (typically 45 minutes face-to-face)  [] RE-EVAL     [] EM(69524) x     [] Dry needle 1 or 2 Muscles (93383)  [] NMR (48236) x     [] Dry needle 3+ Muscles (24139)  [] Manual (11573) x     [] Ultrasound (70585) x  [x] TA (96627) x  2   [] Mech Traction (51269)  [] ES(attended) (81116)     [] ES (un) (88277):   [] Vasopump (96316) [] Ionto (57789)   [] Other:    Jamie Cook stated goal: able to stand/ walk and prepare a meal for family. [] Progressing: [] Met: [] Not Met: [] Adjusted  Therapist goals for Patient:   Short Term Goals: To be achieved in: 2 weeks  1. Independent in HEP and progression per patient tolerance, in order to prevent re-injury. [] Progressing: [] Met: [] Not Met: [] Adjusted  2. Patient will have a decrease in pain to facilitate improvement in movement, function, and ADLs as indicated by Functional Deficits. [] Progressing: [] Met: [] Not Met: [] Adjusted    Long Term Goals: To be achieved in: 6-10 weeks  1. Increase FOTO functional outcome score from 24 to 42 to assist with reaching prior level of function. [] Progressing: [] Met: [] Not Met: [] Adjusted  2. Patient will demonstrate increased AROM to WNL, good LS mobility, good hip ROM to allow for proper joint functioning as indicated by patients Functional Deficits. [] Progressing: [] Met: [] Not Met: [] Adjusted  3. Patient will demonstrate an increase in Strength of lower quarter to be able to rise from chair at least 3x in 30sec. [] Progressing: [] Met: [] Not Met: [] Adjusted  4. Patient will be able to reach arms overhead to at least 130* bilat to get closer to functional mobility. [] Progressing: [] Met: [] Not Met: [] Adjusted  5. Pt will have no episodes of loss of bladder control over a 2 week period.    [] Progressing: [] Met: [] Not Met: [] Adjusted     ASSESSMENT:  See eval    Treatment/Activity Tolerance:  [x] Patient tolerated treatment well [] Patient limited by fatique  [] Patient limited by pain  [] Patient limited by other medical complications  [] Other:     Overall Progression Towards Functional goals/ Treatment Progress Update:  [] Patient is progressing as expected towards functional goals listed. [] Progression is slowed due to complexities/Impairments listed. [] Progression has been slowed due to co-morbidities. [x] Plan just implemented, too soon to assess goals progression <30days   [] Goals require adjustment due to lack of progress  [] Patient is not progressing as expected and requires additional follow up with physician  [] Other:    Prognosis for POC: [x] Good [] Fair  [] Poor    Patient requires continued skilled intervention: [x] Yes  [] No        PLAN: See eval  [] Continue per plan of care [] Alter current plan (see comments)  [x] Plan of care initiated [] Hold pending MD visit [] Discharge    Electronically signed by: Karina Casarez PT , DPT  #[id number]      Note: If patient does not return for scheduled/recommended follow up visits, this note will serve as a discharge from care along with the most recent update on progress.

## 2022-10-24 NOTE — PLAN OF CARE
[facility], [facility]  [address] [facility], [facility]  Phone: [phone]   Fax:     [phone]                                                       Physical Therapy Certification    Dear Andrzej Hodge MD,    We had the pleasure of evaluating the following patient for physical therapy services at [facility]. A summary of our findings can be found in the initial assessment below. This includes our plan of care. If you have any questions or concerns regarding these findings, please do not hesitate to contact me at the office phone number checked above. Thank you for the referral.       Physician Signature:_______________________________Date:__________________  By signing above (or electronic signature), therapists plan is approved by physician        Patient: Jame Bardales   : 1970   MRN: [id number]  Referring Physician: Andrzej Hodge MD      Evaluation Date: 10/24/2022      Medical Diagnosis Information:  Medical Diagnosis: [facility] [S32.010A]   Treatment Diagnosis: back pain, limited mobility, activity tolerance, gait dysfunction, bladder dysfunction                                         Insurance information:         Precautions/ Contra-indications: L1 kyphoplasty, progressive weakness? Latex Allergy:  [x]NO      []YES  Preferred Language for Healthcare:   [x]English       []other:    C-SSRS Triggered by Intake questionnaire (Past 2 wk assessment ):   [x] No, Questionnaire did not trigger screening.   [] Yes, Patient intake triggered C-SSRS Screening      [] C-SSRS Screening completed  [] PCP notified via Epic     SUBJECTIVE: Patient stated complaint: Pt here for evaluation for c/o back and R leg pain.  He reports a long hx of a chronic pain, but about a year ago, he had a fall and he sustained compression fracture of L1 and had kyphoplasty around March. That seemed to help with pain for a month or two, but then things started to get bad. He now reports for the past 4-6 weeks that he had entire RLE pain numbness into his foot. He also reports along that same time frame that he has many instances at night where he loses control of his bladder, and also has difficulty raising arms overhead which apparently is somewhat new. He has been very diligent about setting an alarm to go to the restroom every 2 hours to prevent accidents during the day. The past few weeks, he reports increased episodes of bed wetting at night. He last saw spine surgeon a few weeks ago regarding bed wetting and it was recommended to try epidural and possibly surgery. He apparently was denied epidurals and was told to try PT first by his insurance. He is ambulating w/ cane, but states his gait has worsened lately and he has considered using a walker at times. After initial injury, he states compression fracture was around 10% compressed, now he states its around 40% and he was told when it reaches 50%, he has to have surgery due to pressure on spinal cord. Next f/u w/ surgeon isn't till middle of next month. He also has osteoporosis and he attributes that to malnutrition from his bypass surgery which was many years ago. He has tried taking calcium/ D supplements and also tried a Rx medicine for osteoporosis, but per his recent Dexa scans, he states his numbers didn't change much. He is going to follow up with gastro doctor next month sometime. He is wearing a TLSO and was told to wear it as much as he can. He was wearing a lumbar support after surgery, but now has this TLSO. He walks w/ a cane. At his last f/u w/ surgeon, he had just started to have severe pain and also loss of bladder control; per his report this has worsened a great deal since that time. He has tried to contact their office, but only got thru to his .      *RLE pain: >75% of the day. Relieved only partially w/ pain meds. Worse in the morning; sleeps in his TLSO due to following instructions to wear it as much as possible. 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 *future: discuss diet (vegan?)    Imaging:  Impression       1. Mild nonspecific marrow edema involving the base of the T7 spinous process. No correlate is seen on CT chest dated 3/21/2020. Consider CT thoracic spine to exclude a nondisplaced fracture. 2.   Chronic T5, T8, L1 compression fractures. 3.  Thoracic spondylosis resulting in mild T7-8 and T9-10 spinal canal stenoses and mild left T6-7 and T7-8 neural foraminal narrowing. 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 Posture: sits w/ moderate kyphosis and stands w/ mild to moderate kyphosis    Gait: (include devices/WB status) mild unsteadiness w/ cane    Bandages/Dressings/Incisions: NA    Repeated Movements:    ROM  Comments   Lumbar Flex     Lumbar Ext       ROM LEFT RIGHT Comments   Lumbar Side Bend      Lumbar Rotation      Quadrant      Hip Flexion      Hip Abd      Hip ER      Hip IR      Hip Extension      Knee Ext      Knee Flex      Hamstring Flex      Piriformis      Kevon test             Shldr AROM Approx * Approx 120* **per his report, this is much worse than baseline.    **pos for mod winging scapulae bilat            Myotomes/Strength Left  Right Comments   []ALL NORMAL MYOTOMES      Hip Abd      Hip Ext      Hip flexion (L1-L2 femoral)      Knee extension (L2-L4 femoral) 4+/5 4-/5    Knee flexion (S1 sciatic)      Dorsiflexion (L4-L5 deep peroneal) 4/5 4-/5    Great Toe Ext (L5 deep peroneal nerve) 4/5 3/5    Ankle Eversion (S1-S2 super peroneal) 4-/5 3/5    Ankle PF(S1-S2 tibial)   Unable to walk on balls of feet   Multifidus      Transverse Ab             40# 20#    UE myomtomes 4-/5 4-/5 Bicep/ triceps, wrists           Dermatomes Normal Abnormal Comments   [x]ALL NORMAL            inguinal area (L1)  [] []    anterior mid-thigh (L2) [] []    distal ant thigh/med knee (L3) [] []    medial lower leg and foot (L4) [] []    lateral lower leg and foot (L5) [] []    posterior calf (S1) [] []    medial calcaneus (S2) [] []      Reflexes Normal Abnormal Comments   [x]ALL NORMAL            S1-2 Seated achilles [] [x] absent   S1-2 Prone knee bend [] []    L3-4 Patellar tendon [] [] 1+ bilat   C5-6 Biceps [] [x] absent   C6 Brachioradialis [] []    C7-8 Triceps [] []    Clonus [x] []    Babinski [x] []    Wei's [] x Questionably pos bilat     Joint mobility:    []Normal    []Hypo   []Hyper    Neurodynamics:     Orthopedic Special Tests:     TUsec w/ cane (mild to mod gait instability)       Neural dynamic tension testing Normal Abnormal Comments   Slump Test  - Degree of knee flexion:  [] [x] Pos on R   SLR  [] []    0-30 [] []    30-70 [] []    Femoral nerve (L2-4) [] []       Normal Abnormal N/A Comments   Fwd Bend-aberrant or innominate mvmt) [] [] []    Trendelenburg [] [] []    Kemps/Quadrant [] [] []    Louie Crumb [] [] []    NESTOR/Luis [] [] []    Hip scour [] [] []    Supine to sit [] [] []    Prone knee bend [] [] []           Hip thrust [] [] []    SI distraction/compression [] [] []    Sacral Spring/thrust [] [] []               [x] Patient history, allergies, meds reviewed. Medical chart reviewed. See intake form.      Review Of Systems (ROS):  [x]Performed Review of systems (Integumentary, CardioPulmonary, Neurological) by intake and observation. Intake form has been scanned into medical record. Patient has been instructed to contact their primary care physician regarding ROS issues if not already being addressed at this time. Co-morbidities/Complexities (which will affect course of rehabilitation):  has a past medical history of Alcoholism ([facility] Allergic migraine with status migrainosus, Allergic rhinitis, seasonal, Anemia, Arthritis, Vaughn's esophagus, Benign intracranial hypertension, Cancer ([facility] Carpal tunnel syndrome, Chronic pain, Depression, Ear infection, GERD (gastroesophageal reflux disease), Headache(784.0), History of blood transfusion, History of tobacco use, Migraine, Morbid obesity ([facility] Movement disorder, Onychomycosis, Sleep apnea, obstructive, Unspecified cerebral artery occlusion with cerebral infarction, Wears dentures, and Wears glasses.     []None           Arthritic conditions   []Rheumatoid arthritis (M05.9)  []Osteoarthritis (M19.91)   Cardiovascular conditions   []Hypertension (I10)  []Hyperlipidemia (E78.5)  []Angina pectoris (I20)  []Atherosclerosis (I70)  []CVA Musculoskeletal conditions   []Disc pathology   []Congenital spine pathologies   []Prior surgical intervention  []Osteoporosis (M81.8)  []Osteopenia (M85.8)   Endocrine conditions   []Hypothyroid (E03.9)  []Hyperthyroid Gastrointestinal conditions   []Constipation (E41.23)   Metabolic conditions   []Morbid obesity (E66.01)  []Diabetes type 1(E10.65) or 2 (E11.65)   []Neuropathy (G60.9)     Pulmonary conditions   []Asthma (J45)  []Coughing   []COPD (J44.9)   Psychological Disorders  []Anxiety (F41.9)  []Depression (F32.9)   []Other:   [x]Other:     See above      Barriers to/and or personal factors that will affect rehab potential:              []Age  []Sex    []Smoker              []Motivation/Lack of Motivation                        [x]Co-Morbidities              []Cognitive Function, education/learning barriers [x]Environmental, home barriers              []profession/work barriers  [x]past PT/medical experience  []other:  Justification:     Falls Risk Assessment (30 days):   [x] Falls Risk assessed and no intervention required. [] Falls Risk assessed and Patient requires intervention due to being higher risk   TUG score (>12s at risk):     [] Falls education provided, including:         ASSESSMENT:   Functional Impairments:     []Noted lumbar/proximal hip hypomobility   []Noted lumbosacral and/or generalized hypermobility   []Decreased Lumbosacral/hip/LE functional ROM   [x]Decreased core/proximal hip strength and neuromuscular control    [x]Decreased LE functional strength    [x]Abnormal reflexes/sensation/myotomal/dermatomal deficits  [x]Reduced balance/proprioceptive control    [x]other:  noted AROM and strength deficits throughout Upper quarter as well. Functional Activity Limitations (from functional questionnaire and intake)   [x]Reduced ability to tolerate prolonged functional positions   [x]Reduced ability or difficulty with changes of positions or transfers between positions   [x]Reduced ability to maintain good posture and demonstrate good body mechanics with sitting, bending, and lifting   [x]Reduced ability to sleep   [x] Reduced ability or tolerance with driving and/or computer work   [x]Reduced ability to perform lifting, reaching, carrying tasks   [x]Reduced ability to squat   [x]Reduced ability to forward bend   [x]Reduced ability to ambulate prolonged functional periods/distances/surfaces   [x]Reduced ability to ascend/descend stairs   []other:       Participation Restrictions   [x]Reduced participation in self care activities   [x]Reduced participation in home management activities   []Reduced participation in work activities   [x]Reduced participation in social activities. []Reduced participation in sport/recreational activities.     Classification:   []Signs/symptoms consistent with Lumbar instability/stabilization subgroup. []Signs/symptoms consistent with Lumbar mobilization/manipulation subgroup, myotomes and dermatomes intact. Meets manipulation criteria. []Signs/symptoms consistent with Lumbar direction specific/centralization subgroup   []Signs/symptoms consistent with Lumbar traction subgroup       []Signs/symptoms consistent with lumbar facet dysfunction   [x]Signs/symptoms consistent with lumbar stenosis type dysfunction   [x]Signs/symptoms consistent with nerve root involvement including myotome & dermatome dysfunction   [x]Signs/symptoms consistent with post-surgical status including: decreased ROM, strength and function. []signs/symptoms consistent with pathology which may benefit from Dry needling     [x]other:  s/s consistent w/ functional decline and deficits of upper and lower quarter.      Prognosis/Rehab Potential:      []Excellent   []Good    [x]Fair   []Poor    Tolerance of evaluation/treatment:    []Excellent   []Good    [x]Fair   []Poor     Physical Therapy Evaluation Complexity Justification  [x] A history of present problem with:  [] no personal factors and/or comorbidities that impact the plan of care;  []1-2 personal factors and/or comorbidities that impact the plan of care  [x]3 personal factors and/or comorbidities that impact the plan of care  [x] An examination of body systems using standardized tests and measures addressing any of the following: body structures and functions (impairments), activity limitations, and/or participation restrictions;:  [] a total of 1-2 or more elements   [x] a total of 3 or more elements   [] a total of 4 or more elements   [x] A clinical presentation with:  [] stable and/or uncomplicated characteristics   [x] evolving clinical presentation with changing characteristics  [] unstable and unpredictable characteristics;   [x] Clinical decision making of [] low, [] moderate, [] high complexity using standardized patient assessment instrument and/or measurable assessment of functional outcome. [] EVAL (LOW) 29844 (typically 20 minutes face-to-face)  [x] EVAL (MOD) 11224 (typically 30 minutes face-to-face)  [] EVAL (HIGH) 94851 (typically 45 minutes face-to-face)  [] RE-EVAL     PLAN:     Frequency/Duration:  up to 2 days per week for up to 6-10 Weeks:  Interventions:  [x]  Therapeutic exercise including: strength training, ROM, for LE, Glutes and core   [x]  NMR activation and proprioception for glutes , LE and Core   [x]  Manual therapy as indicated for Hip complex, LE and spine to include: Dry Needling/IASTM, STM, PROM, Gr I-IV mobilizations, manipulation. [x]  Modalities as needed that may include: thermal agents, E-stim, Biofeedback, US, iontophoresis as indicated  [x]  Patient education on joint protection, postural re-education, activity modification, progression of HEP. [x]  Aquatic exercise including: strength training, ROM and balance for LE, Glutes and core     HEP instruction: edu: use of rolling walker to maximize gait tolerance, importance to avoid prolonged kypohotic postures: engage posterior chain. GOALS:  Patient stated goal: able to stand/ walk and prepare a meal for family. [] Progressing: [] Met: [] Not Met: [] Adjusted  Therapist goals for Patient:   Short Term Goals: To be achieved in: 2 weeks  1. Independent in HEP and progression per patient tolerance, in order to prevent re-injury. [] Progressing: [] Met: [] Not Met: [] Adjusted  2. Patient will have a decrease in pain to facilitate improvement in movement, function, and ADLs as indicated by Functional Deficits. [] Progressing: [] Met: [] Not Met: [] Adjusted    Long Term Goals: To be achieved in: 6-10 weeks  1. Increase FOTO functional outcome score from 24 to 42 to assist with reaching prior level of function. [] Progressing: [] Met: [] Not Met: [] Adjusted  2.  Patient will demonstrate increased AROM to WNL, good LS mobility, good hip ROM to allow for proper joint functioning as indicated by patients Functional Deficits. [] Progressing: [] Met: [] Not Met: [] Adjusted  3. Patient will demonstrate an increase in Strength of lower quarter to be able to rise from chair at least 3x in 30sec. [] Progressing: [] Met: [] Not Met: [] Adjusted  4. Patient will be able to reach arms overhead to at least 130* bilat to get closer to functional mobility. [] Progressing: [] Met: [] Not Met: [] Adjusted  5. Pt will have no episodes of loss of bladder control over a 2 week period. [] Progressing: [] Met: [] Not Met: [] Adjusted     Electronically signed by: Anna Pozo, PT , DPT  #[id number]          Note: If patient does not return for scheduled/recommended follow up visits, this note will serve as a discharge from care along with the most recent update on progress.

## 2022-10-27 ENCOUNTER — OFFICE VISIT (OUTPATIENT)
Dept: FAMILY MEDICINE CLINIC | Age: 52
End: 2022-10-27
Payer: COMMERCIAL

## 2022-10-27 ENCOUNTER — HOSPITAL ENCOUNTER (OUTPATIENT)
Dept: PHYSICAL THERAPY | Age: 52
Setting detail: THERAPIES SERIES
Discharge: HOME | End: 2022-10-27
Payer: COMMERCIAL

## 2022-10-27 VITALS
HEART RATE: 70 BPM | RESPIRATION RATE: 14 BRPM | BODY MASS INDEX: 28.21 KG/M2 | SYSTOLIC BLOOD PRESSURE: 120 MMHG | DIASTOLIC BLOOD PRESSURE: 70 MMHG | WEIGHT: 208 LBS | OXYGEN SATURATION: 98 %

## 2022-10-27 DIAGNOSIS — R53.1 GENERALIZED WEAKNESS: ICD-10-CM

## 2022-10-27 DIAGNOSIS — Z87.81 HISTORY OF VERTEBRAL COMPRESSION FRACTURE: ICD-10-CM

## 2022-10-27 DIAGNOSIS — R32 URINARY INCONTINENCE, UNSPECIFIED TYPE: ICD-10-CM

## 2022-10-27 DIAGNOSIS — G89.4 PAIN SYNDROME, CHRONIC: ICD-10-CM

## 2022-10-27 DIAGNOSIS — F33.1 MODERATE EPISODE OF RECURRENT MAJOR DEPRESSIVE DISORDER (HCC): Chronic | ICD-10-CM

## 2022-10-27 DIAGNOSIS — M54.31 SCIATICA OF RIGHT SIDE: Primary | ICD-10-CM

## 2022-10-27 DIAGNOSIS — M85.89 OSTEOPENIA OF MULTIPLE SITES: ICD-10-CM

## 2022-10-27 DIAGNOSIS — M94.0 COSTOCHONDRITIS: ICD-10-CM

## 2022-10-27 PROBLEM — K56.609 SMALL BOWEL OBSTRUCTION (HCC): Status: RESOLVED | Noted: 2021-12-26 | Resolved: 2022-10-27

## 2022-10-27 PROBLEM — S70.02XA CONTUSION OF LEFT HIP: Status: RESOLVED | Noted: 2022-02-02 | Resolved: 2022-10-27

## 2022-10-27 LAB
BILIRUBIN URINE: NEGATIVE
BLOOD, URINE: NEGATIVE
C-REACTIVE PROTEIN: <3 MG/L (ref 0–5.1)
CLARITY: CLEAR
COLOR: YELLOW
GLUCOSE URINE: NEGATIVE MG/DL
KETONES, URINE: NEGATIVE MG/DL
LEUKOCYTE ESTERASE, URINE: NEGATIVE
MICROSCOPIC EXAMINATION: NORMAL
NITRITE, URINE: NEGATIVE
PH UA: 5.5 (ref 5–8)
PROTEIN UA: NEGATIVE MG/DL
SEDIMENTATION RATE, ERYTHROCYTE: <1 MM/HR (ref 0–20)
SPECIFIC GRAVITY UA: 1.02 (ref 1–1.03)
TSH SERPL DL<=0.05 MIU/L-ACNC: 0.97 UIU/ML (ref 0.27–4.2)
URINE TYPE: NORMAL
UROBILINOGEN, URINE: 0.2 E.U./DL

## 2022-10-27 PROCEDURE — 97110 THERAPEUTIC EXERCISES: CPT

## 2022-10-27 PROCEDURE — 99215 OFFICE O/P EST HI 40 MIN: CPT | Performed by: FAMILY MEDICINE

## 2022-10-27 PROCEDURE — 97530 THERAPEUTIC ACTIVITIES: CPT

## 2022-10-27 RX ORDER — OXYCODONE HYDROCHLORIDE 5 MG/1
TABLET ORAL
COMMUNITY
Start: 2022-10-20 | End: 2022-10-27 | Stop reason: ALTCHOICE

## 2022-10-27 RX ORDER — OXYCODONE HCL 10 MG/1
10 TABLET, FILM COATED, EXTENDED RELEASE ORAL 2 TIMES DAILY
Qty: 60 TABLET | Refills: 0 | Status: SHIPPED | OUTPATIENT
Start: 2022-10-27 | End: 2022-11-15 | Stop reason: RX

## 2022-10-27 RX ORDER — CYANOCOBALAMIN 1000 UG/ML
INJECTION, SOLUTION INTRAMUSCULAR; SUBCUTANEOUS
COMMUNITY
Start: 2021-04-08

## 2022-10-27 NOTE — FLOWSHEET NOTE
[place] [facility], [facility]  [address] [facility] [place], [facility]  Phone: [phone]   Fax:     [phone]    Physical Therapy Treatment Note/ Progress Report:     Date:  10/27/2022    Patient Name:  Сергей Sparks    :  1970  MRN: [id number]    Pertinent Medical History:      Medical/Treatment Diagnosis Information:  Medical Diagnosis: Compression fracture of L1 lumbar vertebra ([address] [D27.366N]  Treatment Diagnosis: back pain, limited mobility, activity tolerance, gait dysfunction, bladder dysfunction    Insurance/Certification information:     Physician Information:  Shu Toro MD  Plan of care signed (Y/N):     Date of Patient follow up with Physician:      Progress Report: []  Yes  [x]  No     Date Range for reporting period:  Beginning: 10/24/2022  Ending:    Progress report due (10 Rx/or 30 days whichever is less): #11     Recertification due (POC duration/ or 90 days whichever is less):     Visit # POC/ Insurance Allowable Auth Needed    [facility] []Yes    []No     Functional Outcomes Measure:   Date Assessed: at eval  Test:FOTO  Score:    Pain level:  /10     History of Injury: Pt here for evaluation for c/o back and R leg pain. He reports a long hx of a chronic pain, but about a year ago, he had a fall and he sustained compression fracture of L1 and had kyphoplasty around March. He wore a lumbar brace up until a few months ago. That seemed to help with pain for a month or two, but then things started to get bad. He now reports for the past 4-6 weeks that he had entire RLE pain numbness into his foot. He also reports along that same time frame that he has many instances at night where he loses control of his bladder, and also has difficulty raising arms overhead which apparently is somewhat new.  He has been very diligent about setting an alarm to go to the restroom every 2 hours to prevent accidents during the day. The past few weeks, he reports increased episodes of bed wetting at night. He last saw spine surgeon a few weeks ago regarding bed wetting and it was recommended to try epidural and possibly surgery. He apparently was denied epidurals and was told to try PT first by his insurance. He is ambulating w/ cane, but states his gait has worsened lately and he has considered using a walker at times. After initial injury, he states compression fracture was around 10% compressed, now he states its around 40% and he was told when it reaches 50%, he has to have surgery due to pressure on spinal cord. Next f/u w/ surgeon isn't till middle of next month. He also has osteoporosis and he attributes that to malnutrition from his bypass surgery which was many years ago. He has tried taking calcium/ D supplements and also tried a Rx medicine for osteoporosis, but per his recent Dexa scans, he states his numbers didn't change much. He is going to follow up with gastro doctor next month sometime. He is wearing a TLSO and was told to wear it as much as he can. He was wearing a lumbar support after surgery, but now has this TLSO. He walks w/ a cane. At his last f/u w/ surgeon, he had just started to have severe pain and also loss of bladder control; per his report this has worsened a great deal since that time. He has tried to contact their office, but only got thru to his . *RLE pain: >75% of the day. Relieved only partially w/ pain meds. Worse in the morning; sleeps in his TLSO due to following instructions to wear it as much as possible. 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       *future: discuss diet (vegan?)  *PT recommended f/u w/ PCP re: bone density and cont'd worsening bladder dysfunction/ UE/LE strength deficits    *primary problems:  - new increased RLE pain (intermittent, but lasts for hours at a time)  - new numbness along L4/5 dermatome    *Prior function:   - walk dogs few miles,   -ride 50cc scooter  - housework: 1/2 day at a time  - able to carry musician equipment  - wood working    *Current function:   - ~15min of housework,   - son needs to carry musician equipment     Imaging:  Impression       1. Mild nonspecific marrow edema involving the base of the T7 spinous process. No correlate is seen on CT chest dated 3/21/2020. Consider CT thoracic spine to exclude a nondisplaced fracture. 2.   Chronic T5, T8, L1 compression fractures. 3.  Thoracic spondylosis resulting in mild T7-8 and T9-10 spinal canal stenoses and mild left T6-7 and T7-8 neural foraminal narrowing. *comp fx was 75% ht loss in Sept 2021  *40% in Jan 2022  *40%  in Sept 2022      Hx: CVA in 2014: L side effected; moderate deficits at that time. SUBJECTIVE:    10/27: saw PCP earlier and feels a lot better about the direction he is headed. We did review his symptoms quite a bit and his biggest concern is new RLE pain and weakness. He is used to the chronic back pain. OBJECTIVE:   Observation:   Test measurements:      RESTRICTIONS/PRECAUTIONS: L1 kyphoplasty, chronic pain, worsening gait/ UE/LE weakness    Exercises/Interventions:     Therapeutic Ex (45292)   Min: Resistance/Reps Notes/Cues   Warm up >walking? Mini squats @ table x 10 Mod difficulty        Hookly bridge 2x10 Mod difficult   ST hip abd >    EOB hip ext >         scaption >         Seated row >              Pt edu Reviewed HEP; see below    Therapeutic Activity (10716) Min:      Pt edu Pathology, role of improving core/ torso postural strength, role of exercise to progress into function,                    NMR re-education (70360)   Min:     gait 2 clinic laps w/ cane Adjusted cane for proper height.                   Manual Intervention (45345) Min:                Modalities  Min:             Other Therapeutic Activities:  Pt was educated on PT POC, Diagnosis, Prognosis, pathomechanics as well as frequency and duration of scheduling future physical therapy appointments. Time was also taken on this day to answer all patient questions and participation in PT. Reviewed appointment policy in detail with patient and patient verbalized understanding. Home Exercise Program: Patient instructed in the following for HEP:      10/27: hookly bridge, mini squats, walking program       . Patient verbalized/demonstrated understanding and was issued written handout. Therapeutic Exercise and NMR EXR  [x] (79971) Provided verbal/tactile cueing for activities related to strengthening, flexibility, endurance, ROM  for improvements in proximal hip and core control with self care, mobility, lifting and ambulation. [x] (47496) Provided verbal/tactile cueing for activities related to improving balance, coordination, kinesthetic sense, posture, motor skill, proprioception  to assist with core control in self care, mobility, lifting, and ambulation.      Therapeutic Activities:    [x] (43412 or 96802) Provided verbal/tactile cueing for activities related to improving balance, coordination, kinesthetic sense, posture, motor skill, proprioception and motor activation to allow for proper function  with self care and ADLs  [x] (21941) Provided training and instruction to the patient for proper core and proximal hip recruitment and positioning with ambulation re-education     Home Exercise Program:    [x] (53241) Reviewed/Progressed HEP activities related to strengthening, flexibility, endurance, ROM of core, proximal hip and LE for functional self-care, mobility, lifting and ambulation   [x] (31645) Reviewed/Progressed HEP activities related to improving balance, coordination, kinesthetic sense, posture, motor skill, proprioception of core, proximal hip and LE for self care, mobility, lifting, and ambulation      Manual Treatments:  PROM / STM / Oscillations-Mobs:  G-I, II, III, IV (PA's, Inf., Post.)  [x] (27840) Provided manual therapy to mobilize proximal hip and LS spine soft tissue/joints for the purpose of modulating pain, promoting relaxation,  increasing ROM, reducing/eliminating soft tissue swelling/inflammation/restriction, improving soft tissue extensibility and allowing for proper ROM for normal function with self care, mobility, lifting and ambulation. Approval Dates:  CPT Code Units Approved Units Used  Date Updated:                     Charges:  Timed Code Treatment Minutes: 35   Total Treatment Minutes: 35     [] EVAL (LOW) 00259 (typically 20 minutes face-to-face)  [] EVAL (MOD) 56899 (typically 30 minutes face-to-face)  [] EVAL (HIGH) 57644 (typically 45 minutes face-to-face)  [] RE-EVAL     [x] AU(17786) x     [] Dry needle 1 or 2 Muscles (62062)  [] NMR (60015) x     [] Dry needle 3+ Muscles (50253)  [] Manual (94924) x     [] Ultrasound (03482) x  [x] TA (40946) x     [] Mech Traction (12724)  [] ES(attended) (86329)     [] ES (un) (23613):   [] Vasopump (58790) [] Ionto (51252)   [] Other:    Skip June stated goal: able to stand/ walk and prepare a meal for family. [] Progressing: [] Met: [] Not Met: [] Adjusted  Therapist goals for Patient:   Short Term Goals: To be achieved in: 2 weeks  1. Independent in HEP and progression per patient tolerance, in order to prevent re-injury. [] Progressing: [] Met: [] Not Met: [] Adjusted  2. Patient will have a decrease in pain to facilitate improvement in movement, function, and ADLs as indicated by Functional Deficits. [] Progressing: [] Met: [] Not Met: [] Adjusted    Long Term Goals: To be achieved in: 6-10 weeks  1. Increase FOTO functional outcome score from 24 to 42 to assist with reaching prior level of function. [] Progressing: [] Met: [] Not Met: [] Adjusted  2.  Patient will demonstrate increased AROM to WNL, good LS mobility, good hip ROM to allow for proper joint functioning as indicated by patients Functional Deficits. [] Progressing: [] Met: [] Not Met: [] Adjusted  3. Patient will demonstrate an increase in Strength of lower quarter to be able to rise from chair at least 3x in 30sec. [] Progressing: [] Met: [] Not Met: [] Adjusted  4. Patient will be able to reach arms overhead to at least 130* bilat to get closer to functional mobility. [] Progressing: [] Met: [] Not Met: [] Adjusted  5. Pt will have no episodes of loss of bladder control over a 2 week period. [] Progressing: [] Met: [] Not Met: [] Adjusted     ASSESSMENT:  Jennifer session well. Initial soreness w/ bridges, but improved after a few reps. Able to complete program today w/o RLE pain. Likely needs a good deal of postural endurance work and overall strength work. Treatment/Activity Tolerance:  [x] Patient tolerated treatment well [] Patient limited by fatique  [] Patient limited by pain  [] Patient limited by other medical complications  [] Other:     Overall Progression Towards Functional goals/ Treatment Progress Update:  [] Patient is progressing as expected towards functional goals listed. [] Progression is slowed due to complexities/Impairments listed. [] Progression has been slowed due to co-morbidities. [x] Plan just implemented, too soon to assess goals progression <30days   [] Goals require adjustment due to lack of progress  [] Patient is not progressing as expected and requires additional follow up with physician  [] Other:    Prognosis for POC: [x] Good [] Fair  [] Poor    Patient requires continued skilled intervention: [x] Yes  [] No        PLAN: See eval  [] Continue per plan of care [] Alter current plan (see comments)  [x] Plan of care initiated [] Hold pending MD visit [] Discharge    Electronically signed by: Nano Suráez PT , DPT  #[id number]      Note: If patient does not return for scheduled/recommended follow up visits, this note will serve as a discharge from care along with the most recent update on progress.

## 2022-10-27 NOTE — PROGRESS NOTES
10/27/2022    Blood pressure 120/70, pulse 70, resp. rate 14, weight 208 lb (94.3 kg), SpO2 98 %. Skye Pettit (:  1970) is a 47 y.o. male, here for evaluation of the following medical concerns:    Chief Complaint   Patient presents with    Chronic Pain     Chronic pain from back, loss of function    Osteoporosis     Here for pain issues. Was in chest pain, with deep breathing. Also coughing. Which also increased chest pain. 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 This was 2 wks ago. Had ekg/cxr, etc.  Ua, cbc, cmp normal.  Neg work up. Called Musculoskeletal pain. Adrian Adames has continued pain, located to R chest wall. He has compression fractures in his L1 vert body. Managed by [facility]. Had kyphoplasty and is progressing despite care. His bone density is low- DEXA done . Osteopenia of spine/R-L femoral necks. Had Reclast infusion earlier this year. Takes calcium/D twice daily (os hanh-D)  Has hx bariatric surgery. Last vit D level : 51.8. Wears a back brace,. Prescribed by Dr Dimas Lombardi. Pain is progressing to R leg, down medial calf. Pain is buring. Triggered by motion. Can persists when not moving also. He is starting to lose bladder control- incontinence. Mostly at bedtime. Avoids this during the day by timed urination. Does not feel need to urinate until 'too late.'  This started a month ago or so. No burning. Managed by DR Treasure Abreu  Using opioid pain meds from him. Gets a week at a time, which is problematic and requires a lot of coordination. MRI was last done of lumbar spine when this started a month ago: No significant change since prior CT of the abdomen and pelvis. Unchanged chronic superior endplate compression fracture of L1 with 40%   height loss and post kyphoplasty changes.        At L2-L3, grade 1 retrolisthesis, mild to moderate canal stenosis and mild   bilateral neural foraminal narrowing and left paracentral disc protrusion. At L4-L5, grade 1 anterolisthesis. Mild bilateral neural foraminal narrowing. Thoracic spine showed: Impression       1. Mild nonspecific marrow edema involving the base of the T7 spinous process. No correlate is seen on CT chest dated 3/21/2020. Consider CT thoracic spine to exclude a nondisplaced fracture. 2.   Chronic T5, T8, L1 compression fractures. 3.  Thoracic spondylosis resulting in mild T7-8 and T9-10 spinal canal stenoses and mild left T6-7 and T7-8 neural foraminal narrowing. He is feeling generalized weakness of upper and lower body. Not sleeping well due to progression of pain. Using MJ for pain/sleep also. Still taking traz 200 and fluox 20. Adjunct pain meds: gabapentin 600 QID (started for chronic headache, Dr Vasile Buchanan at pain medicine clinic). He does not prescribe opioids. Does give ketamine infusions q6 wks with him. MJ vaped. Via dispensary. OARRS report reviewed; is consistent with prescribed use and free of suspicious activity. He is planning to see [facility] PT for R leg pain today, core strengthening. 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 Chronic pain syndrome- abdominal and head    History of stroke: right insular cortex on June 8, 2014 (small PFO; hypergoag w/u neg,  neurology)    Gastroesophageal reflux disease with esophagitis--on daily ppi    Intractable chronic migraine without aura and with status migrainosus    Iron deficiency anemia    B12 deficiency    Malignant neoplasm of left kidney ([address] clear cell. 8/1/18 Dr Nadine Nichols.     History of depression    History of alcoholism [address] [address] [facility]    History of total knee arthroplasty, right    Hematoma of right knee region    TIA (transient ischemic attack) LUE weakness 3/21    Tobacco use    Acute deep vein thrombosis (DVT) of calf muscle vein of both lower extremities (HCC)    Closed compression fracture of body of L1 vertebra (HCC)    Osteopenia of multiple sites    Small bowel obstruction (HCC)    Contusion of left hip    Current smoker    Intractable nausea and vomiting        Body mass index is 28.21 kg/m². Wt Readings from Last 3 Encounters:   10/27/22 208 lb (94.3 kg)   09/29/22 205 lb (93 kg)   06/22/22 201 lb (91.2 kg)       BP Readings from Last 3 Encounters:   10/27/22 120/70   09/29/22 114/72   09/26/22 115/71       Allergies   Allergen Reactions    Nsaids Nausea Only and Other (See Comments)     Hx of Barretts esophagus      Morphine Hives and Itching     Pt gets Hives/itching. Does not tolerate     Prochlorperazine Other (See Comments)     No allergic reaction, patient reported sense of \"restlessness\" and fidgiting    Valtrex [Valacyclovir Hcl] Diarrhea    Fentanyl Itching    Morphine And Related Hives and Itching    Tolmetin Nausea Only     Hx of Barretts esophagus       Prior to Visit Medications    Medication Sig Taking?  Authorizing Provider   cyanocobalamin 1000 MCG/ML injection Inject into the muscle Yes Historical Provider, MD   oxyCODONE (ROXICODONE) 5 MG immediate release tablet TAKE 1 TO 2 TABLETS BY MOUTH EVERY 6 HOURS AS NEEDED FOR PAIN OR POST OPERATIVE PAIN Yes Historical Provider, MD   atorvastatin (LIPITOR) 40 MG tablet TAKE 1 TABLET BY MOUTH EVERYDAY AT BEDTIME Yes Parker Baxter MD   pantoprazole (PROTONIX) 40 MG tablet TAKE 1 TABLET BY MOUTH TWICE A DAY Yes Parker Baxter MD   traZODone (DESYREL) 100 MG tablet Take 2 tablets by mouth nightly Yes Parker Baxter MD   FLUoxetine (PROZAC) 20 MG capsule Take 1 capsule by mouth daily Yes Parker Baxter MD   clopidogrel (PLAVIX) 75 MG tablet TAKE 1 TABLET BY MOUTH EVERY DAY Yes Mary Cohen MD   ondansetron (ZOFRAN ODT) 4 MG disintegrating tablet Take 1 tablet by mouth every 8 hours as needed for Nausea Yes Shaneka Trotter MD   gabapentin (NEURONTIN) 600 MG tablet Take 600 mg by mouth 4 times daily. Yes Historical Provider, MD   sildenafil (REVATIO) 20 MG tablet Take 4 tablets by mouth as needed (30 min prior to intercourse) Yes Kusum Cortes MD   calcium-vitamin D (Etta Meiers) 500-200 MG-UNIT per tablet Take 1 tablet by mouth 2 times daily  Yes Historical Provider, MD   Multiple Vitamin TABS Take 1 tablet by mouth daily  Yes Historical Provider, MD        Social History     Tobacco Use    Smoking status: Former     Packs/day: 0.50     Years: 25.00     Pack years: 12.50     Types: Cigarettes     Start date: 1985     Quit date: 2021     Years since quittin.4    Smokeless tobacco: Never    Tobacco comments:     Patient vapes   Vaping Use    Vaping Use: Former    Substances: Nicotine, THC    Devices: Refillable tank, Pre-filled pod   Substance Use Topics    Alcohol use: No     Alcohol/week: 0.0 standard drinks     Comment: last drink     Drug use: Yes     Types: Marijuana Garrel Calender)     [facility]       Review of Systems As above     Physical Exam  Vitals and nursing note reviewed. Constitutional:       General: He is not in acute distress. Appearance: Normal appearance. He is well-developed. He is not diaphoretic. Cardiovascular:      Rate and Rhythm: Normal rate and regular rhythm. Pulses: Normal pulses. Heart sounds: Normal heart sounds. No murmur heard. Pulmonary:      Effort: Pulmonary effort is normal. No respiratory distress. Breath sounds: Normal breath sounds. No wheezing or rales. Chest:      Chest wall: Tenderness (tender under R clavicle and R parasternal chest wall.) present. Musculoskeletal:      Cervical back: Normal range of motion. Right lower leg: No edema. Left lower leg: No edema. Comments: Spine: reduced ROM, due to pain and brace. Pain with flexion/extenion even a small amount. Rotation also. Tender R gluteus, SI joint and trochanter. Tender R anteromedial thigh with increase pain with resisted hip flexion/adduction/internal hip rotation. R knee is replaced and has no knee jerk resonse. Anlke jerk is 0-1 bilat and symmetric  Reduced sensation to SW monofilament in stocking distrubution to ankle on R. Skin:     General: Skin is warm and dry. Neurological:      General: No focal deficit present. Mental Status: He is alert and oriented to person, place, and time. Mental status is at baseline. Psychiatric:         Mood and Affect: Mood normal.         Behavior: Behavior normal.         Thought Content: Thought content normal.         Judgment: Judgment normal.       ASSESSMENT/PLAN:    1. Sciatica of right side  - recent imaging does not show nerve root compression or cauda equina syndrome. advised EMG to help determine cause of R leg numbness. His leg pain appears to be be muscular and would benefit from physical therapy. - EMG; Future    2. Generalized weakness  - cause not clear. Assess for inflammatory/thyroid causes  - TSH; Future  - Sedimentation Rate; Future  - C-Reactive Protein; Future    3. History of vertebral compression fracture (multiple)  - due to BMD loss. Referring to endo for blanchard evaluation    4. Urinary incontinence, unspecified type  - cause is not clear from history. If ua does not show infection he should plan to see his urologist in follow up to rule out outflow obstruction  - Urinalysis; Future    5. Moderate episode of recurrent major depressive disorder ([address]  - worsened due to chronic pain. 6. Osteopenia of multiple sites  - on calcium/D, s/p reclast x1. But compression fractures continue to be a source of pain and dysfunction.   Recommend endo eval of this problem  - Vincenzo Mohs, MD, Endocrinology, [place]    7. Costochondritis  - the cause of his chest wall pain. Advised warm compresses. 8. Pain syndrome, chronic  - mainly related to vertebral compression fractures. Will take over opioid prescription for a few months while he continues to work with [employer] and address BMD loss. - oxyCODONE (OXYCONTIN) 10 MG extended release tablet; Take 1 tablet by mouth 2 times daily for 30 days. Intended supply: 30 days  Dispense: 60 tablet; Refill: 0    Return in about 2 weeks (around 11/10/2022). Total time spent on this encounter: 46 in    An  [employer]ov[employer]ignature was used to authenticate this note.     --Roman York MD on 10/27/2022 at 9:52 AM

## 2022-10-27 NOTE — LETTER
[address] [facility] [address] [facility]  Phone: [phone]  Fax: [phone]    Moustapha Oropeza MD         October 27, 2022     Patient: Skye Pettit   YOB: 1970   Date of Visit: 10/27/2022       To Whom It May Concern: It is my medical opinion that Ela Arriola requires a disability parking placard for the following reasons:  He has limited walking ability due to an orthopedic condition. Duration of need: 1 year    If you have any questions or concerns, please don't hesitate to call.     Sincerely,        Moustapha Oropeza MD

## 2022-10-28 ENCOUNTER — TELEPHONE (OUTPATIENT)
Dept: ENDOCRINOLOGY | Age: 52
End: 2022-10-28

## 2022-10-31 ENCOUNTER — HOSPITAL ENCOUNTER (OUTPATIENT)
Dept: PHYSICAL THERAPY | Age: 52
Setting detail: THERAPIES SERIES
Discharge: HOME | End: 2022-10-31
Payer: COMMERCIAL

## 2022-10-31 ENCOUNTER — TELEPHONE (OUTPATIENT)
Dept: ENDOCRINOLOGY | Age: 52
End: 2022-10-31

## 2022-10-31 PROCEDURE — 97530 THERAPEUTIC ACTIVITIES: CPT

## 2022-10-31 PROCEDURE — 97110 THERAPEUTIC EXERCISES: CPT

## 2022-10-31 NOTE — FLOWSHEET NOTE
[facility], [facility]  [address] [place] [facility], [facility]  Phone: [phone]   Fax:     [phone]    Physical Therapy Treatment Note/ Progress Report:     Date:  10/31/2022    Patient Name:  Bruno Crawford    :  1970  MRN: [id number]    Pertinent Medical History:      Medical/Treatment Diagnosis Information:  Medical Diagnosis: Compression fracture of L1 lumbar vertebra ([address] [Y74.298G]  Treatment Diagnosis: back pain, limited mobility, activity tolerance, gait dysfunction, bladder dysfunction    Insurance/Certification information:     Physician Information:  Mahendra Kim MD  Plan of care signed (Y/N):     Date of Patient follow up with Physician:      Progress Report: []  Yes  [x]  No     Date Range for reporting period:  Beginning: 10/24/2022  Ending:    Progress report due (10 Rx/or 30 days whichever is less): #96     Recertification due (POC duration/ or 90 days whichever is less):     Visit # POC/ Insurance Allowable Auth Needed   3 /  BMN []Yes    []No     Functional Outcomes Measure:   Date Assessed: at eval  Test:FOTO  Score:    Pain level:  /10     History of Injury: Pt here for evaluation for c/o back and R leg pain. He reports a long hx of a chronic pain, but about a year ago, he had a fall and he sustained compression fracture of L1 and had kyphoplasty around March. He wore a lumbar brace up until a few months ago. That seemed to help with pain for a month or two, but then things started to get bad. He now reports for the past 4-6 weeks that he had entire RLE pain numbness into his foot. He also reports along that same time frame that he has many instances at night where he loses control of his bladder, and also has difficulty raising arms overhead which apparently is somewhat new.  He has been very diligent about setting an alarm to go to the restroom every 2 hours to prevent accidents during the day. The past few weeks, he reports increased episodes of bed wetting at night. He last saw spine surgeon a few weeks ago regarding bed wetting and it was recommended to try epidural and possibly surgery. He apparently was denied epidurals and was told to try PT first by his insurance. He is ambulating w/ cane, but states his gait has worsened lately and he has considered using a walker at times. After initial injury, he states compression fracture was around 10% compressed, now he states its around 40% and he was told when it reaches 50%, he has to have surgery due to pressure on spinal cord. Next f/u w/ surgeon isn't till middle of next month. He also has osteoporosis and he attributes that to malnutrition from his bypass surgery which was many years ago. He has tried taking calcium/ D supplements and also tried a Rx medicine for osteoporosis, but per his recent Dexa scans, he states his numbers didn't change much. He is going to follow up with gastro doctor next month sometime. He is wearing a TLSO and was told to wear it as much as he can. He was wearing a lumbar support after surgery, but now has this TLSO. He walks w/ a cane. At his last f/u w/ surgeon, he had just started to have severe pain and also loss of bladder control; per his report this has worsened a great deal since that time. He has tried to contact their office, but only got thru to his . *RLE pain: >75% of the day. Relieved only partially w/ pain meds. Worse in the morning; sleeps in his TLSO due to following instructions to wear it as much as possible. 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       *future: discuss diet (vegan?)  *PT recommended f/u w/ PCP re: bone density and cont'd worsening bladder dysfunction/ UE/LE strength deficits    *primary problems:  - new increased RLE pain (intermittent, but lasts for hours at a time)  - new numbness along L4/5 dermatome    *Prior function:   - walk dogs few miles,   - ride 50cc scooter  - housework: 1/2 day at a time  - able to carry musician equipment  - wood working    *Current function:   - ~15min of housework,   - son needs to carry musician equipment     Imaging:  Impression       1. Mild nonspecific marrow edema involving the base of the T7 spinous process. No correlate is seen on CT chest dated 3/21/2020. Consider CT thoracic spine to exclude a nondisplaced fracture. 2.   Chronic T5, T8, L1 compression fractures. 3.  Thoracic spondylosis resulting in mild T7-8 and T9-10 spinal canal stenoses and mild left T6-7 and T7-8 neural foraminal narrowing. *L1 comp fx was 75% ht loss in Sept 2021  *40% in Jan 2022  *40%  in Sept 2022    Hx: CVA in 2014: L side effected; moderate deficits at that time. SUBJECTIVE:    10/27: saw PCP earlier and feels a lot better about the direction he is headed. We did review his symptoms quite a bit and his biggest concern is new RLE pain and weakness. He is used to the chronic back pain. 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 Typically of late, he has only paid for it w/ pain/ fatigue x 1 day, not 2. He states he feels extremely tired today. After getting ready/ showered, he felt like he could go back to sleep for a long while. States his RLE pain/ numbness actually hasn't been as bad the past few days. Mostly biggest problems of significant low and mid back pain. OBJECTIVE:   Observation:   Test measurements:      RESTRICTIONS/PRECAUTIONS: L1 kyphoplasty, T5 & T8 comp fx, chronic pain, worsening gait/ UE/LE weakness    Exercises/Interventions:     Therapeutic Ex (88328)   Min: Resistance/Reps Notes/Cues   Warm up Treadmill  . 8mph x 2' Mod/ severe fatigue/sore after   Mini squats Mod difficulty        Hookly bridge RESUME  Mod difficult   ST hip abd >add    EOB hip ext >add         steps >up fwd         scaption Seated 2x8, 0-90* bilat, 0#         Seated row >              Pt edu Reviewed HEP; see below    Therapeutic Activity (02614) Min:      Pt edu Pathology, role of postural endurance work, close chain core stab work                   NMR re-education (85009)   Min:     gait Adjusted cane for proper height. Quad  LE ext x 4 ea  UE raise x 2 ea Very fatigued/ painful             Manual Intervention (98196) Min:                Modalities  Min:             Other Therapeutic Activities:  Pt was educated on PT POC, Diagnosis, Prognosis, pathomechanics as well as frequency and duration of scheduling future physical therapy appointments. Time was also taken on this day to answer all patient questions and participation in PT. Reviewed appointment policy in detail with patient and patient verbalized understanding. Home Exercise Program: Patient instructed in the following for HEP:      10/27: hookly bridge, mini squats, walking program       . Patient verbalized/demonstrated understanding and was issued written handout. Therapeutic Exercise and NMR EXR  [x] (09416) Provided verbal/tactile cueing for activities related to strengthening, flexibility, endurance, ROM  for improvements in proximal hip and core control with self care, mobility, lifting and ambulation. [x] (22274) Provided verbal/tactile cueing for activities related to improving balance, coordination, kinesthetic sense, posture, motor skill, proprioception  to assist with core control in self care, mobility, lifting, and ambulation.      Therapeutic Activities:    [x] (56821 or 81672) Provided verbal/tactile cueing for activities related to improving balance, coordination, kinesthetic sense, posture, motor skill, proprioception and motor activation to allow for proper function  with self care and ADLs  [x] (44337) Provided training and instruction to the patient for proper core and proximal hip recruitment and positioning with ambulation re-education Home Exercise Program:    [x] (85832) Reviewed/Progressed HEP activities related to strengthening, flexibility, endurance, ROM of core, proximal hip and LE for functional self-care, mobility, lifting and ambulation   [x] (79378) Reviewed/Progressed HEP activities related to improving balance, coordination, kinesthetic sense, posture, motor skill, proprioception of core, proximal hip and LE for self care, mobility, lifting, and ambulation      Manual Treatments:  PROM / STM / Oscillations-Mobs:  G-I, II, III, IV (PA's, Inf., Post.)  [x] (18574) Provided manual therapy to mobilize proximal hip and LS spine soft tissue/joints for the purpose of modulating pain, promoting relaxation,  increasing ROM, reducing/eliminating soft tissue swelling/inflammation/restriction, improving soft tissue extensibility and allowing for proper ROM for normal function with self care, mobility, lifting and ambulation. Approval Dates:  CPT Code Units Approved Units Used  Date Updated:                     Charges:  Timed Code Treatment Minutes: 34   Total Treatment Minutes: 34     [] EVAL (LOW) 92179 (typically 20 minutes face-to-face)  [] EVAL (MOD) 57844 (typically 30 minutes face-to-face)  [] EVAL (HIGH) 64519 (typically 45 minutes face-to-face)  [] RE-EVAL     [x] OS(19683) x     [] Dry needle 1 or 2 Muscles (74019)  [] NMR (12866) x     [] Dry needle 3+ Muscles (26438)  [] Manual (78885) x     [] Ultrasound (57887) x  [x] TA (14758) x     [] Mech Traction (97361)  [] ES(attended) (30718)     [] ES (un) (78420):   [] Vasopump (39842) [] Ionto (60161)   [] Other:    [place] Every stated goal: able to stand/ walk and prepare a meal for family. [] Progressing: [] Met: [] Not Met: [] Adjusted  Therapist goals for Patient:   Short Term Goals: To be achieved in: 2 weeks  1. Independent in HEP and progression per patient tolerance, in order to prevent re-injury. [] Progressing: [] Met: [] Not Met: [] Adjusted  2.  Patient will have a decrease in pain to facilitate improvement in movement, function, and ADLs as indicated by Functional Deficits. [] Progressing: [] Met: [] Not Met: [] Adjusted    Long Term Goals: To be achieved in: 6-10 weeks  1. Increase FOTO functional outcome score from 24 to 42 to assist with reaching prior level of function. [] Progressing: [] Met: [] Not Met: [] Adjusted  2. Patient will demonstrate increased AROM to WNL, good LS mobility, good hip ROM to allow for proper joint functioning as indicated by patients Functional Deficits. [] Progressing: [] Met: [] Not Met: [] Adjusted  3. Patient will demonstrate an increase in Strength of lower quarter to be able to rise from chair at least 3x in 30sec. [] Progressing: [] Met: [] Not Met: [] Adjusted  4. Patient will be able to reach arms overhead to at least 130* bilat to get closer to functional mobility. [] Progressing: [] Met: [] Not Met: [] Adjusted  5. Pt will have no episodes of loss of bladder control over a 2 week period. [] Progressing: [] Met: [] Not Met: [] Adjusted     ASSESSMENT:  Jennifer session fair/ poor. Very fatigued at start of session, attempted intervals of treadmill walking, but needed to stop ~ 2 min in. He didn't have many c/o RLE radiating pain today, more focal to lower and mid back. Treatment/Activity Tolerance:  [x] Patient tolerated treatment well [] Patient limited by fatique  [] Patient limited by pain  [] Patient limited by other medical complications  [] Other:     Overall Progression Towards Functional goals/ Treatment Progress Update:  [] Patient is progressing as expected towards functional goals listed. [] Progression is slowed due to complexities/Impairments listed. [] Progression has been slowed due to co-morbidities.   [x] Plan just implemented, too soon to assess goals progression <30days   [] Goals require adjustment due to lack of progress  [] Patient is not progressing as expected and requires additional follow up with physician  [] Other:    Prognosis for POC: [x] Good [] Fair  [] Poor    Patient requires continued skilled intervention: [x] Yes  [] No        PLAN: See eval  [x] Continue per plan of care [] Alter current plan (see comments)  [] Plan of care initiated [] Hold pending MD visit [] Discharge    Electronically signed by: Liana Orozco PT , DPT  #[id number]      Note: If patient does not return for scheduled/recommended follow up visits, this note will serve as a discharge from care along with the most recent update on progress.

## 2022-11-01 ENCOUNTER — PATIENT MESSAGE (OUTPATIENT)
Dept: FAMILY MEDICINE CLINIC | Age: 52
End: 2022-11-01

## 2022-11-01 DIAGNOSIS — S32.010A CLOSED COMPRESSION FRACTURE OF BODY OF L1 VERTEBRA (HCC): Primary | ICD-10-CM

## 2022-11-03 ENCOUNTER — HOSPITAL ENCOUNTER (OUTPATIENT)
Dept: PHYSICAL THERAPY | Age: 52
Setting detail: THERAPIES SERIES
Discharge: HOME | End: 2022-11-03
Payer: COMMERCIAL

## 2022-11-03 PROCEDURE — 97110 THERAPEUTIC EXERCISES: CPT

## 2022-11-03 PROCEDURE — 97530 THERAPEUTIC ACTIVITIES: CPT

## 2022-11-03 NOTE — FLOWSHEET NOTE
[facility]  [address] [place]  Phone: [phone]   Fax:     [phone]    Physical Therapy Treatment Note/ Progress Report:     Date:  2022    Patient Name:  Stephen Knutson    :  1970  MRN: [id number]    Pertinent Medical History:      Medical/Treatment Diagnosis Information:  Medical Diagnosis: Compression fracture of L1 lumbar vertebra ([address] [X53.502F]  Treatment Diagnosis: back pain, limited mobility, activity tolerance, gait dysfunction, bladder dysfunction    Insurance/Certification information:     Physician Information:  Nona Bhagat MD  Plan of care signed (Y/N):     Date of Patient follow up with Physician:      Progress Report: []  Yes  [x]  No     Date Range for reporting period:  Beginning: 10/24/2022  Ending:    Progress report due (10 Rx/or 30 days whichever is less): #65     Recertification due (POC duration/ or 90 days whichever is less):     Visit # POC/ Insurance Allowable Auth Needed   4 /  BMN []Yes    []No     Functional Outcomes Measure:   Date Assessed: at eval  Test:FOTO  Score:    Pain level:  /10     History of Injury: Pt here for evaluation for c/o back and R leg pain. He reports a long hx of a chronic pain, but about a year ago, he had a fall and he sustained compression fracture of L1 and had kyphoplasty around March. He wore a lumbar brace up until a few months ago. That seemed to help with pain for a month or two, but then things started to get bad. He now reports for the past 4-6 weeks that he had entire RLE pain numbness into his foot. He also reports along that same time frame that he has many instances at night where he loses control of his bladder, and also has difficulty raising arms overhead which apparently is somewhat new.  He has been very diligent about setting an alarm to go to the restroom every 2 hours to prevent accidents during the day. The past few weeks, he reports increased episodes of bed wetting at night. He last saw spine surgeon a few weeks ago regarding bed wetting and it was recommended to try epidural and possibly surgery. He apparently was denied epidurals and was told to try PT first by his insurance. He is ambulating w/ cane, but states his gait has worsened lately and he has considered using a walker at times. After initial injury, he states compression fracture was around 10% compressed, now he states its around 40% and he was told when it reaches 50%, he has to have surgery due to pressure on spinal cord. Next f/u w/ surgeon isn't till middle of next month. He also has osteoporosis and he attributes that to malnutrition from his bypass surgery which was many years ago. He has tried taking calcium/ D supplements and also tried a Rx medicine for osteoporosis, but per his recent Dexa scans, he states his numbers didn't change much. He is going to follow up with gastro doctor next month sometime. He is wearing a TLSO and was told to wear it as much as he can. He was wearing a lumbar support after surgery, but now has this TLSO. He walks w/ a cane. At his last f/u w/ surgeon, he had just started to have severe pain and also loss of bladder control; per his report this has worsened a great deal since that time. He has tried to contact their office, but only got thru to his . *RLE pain: >75% of the day. Relieved only partially w/ pain meds. Worse in the morning; sleeps in his TLSO due to following instructions to wear it as much as possible. 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       *future: discuss diet (vegan?)  *PT recommended f/u w/ PCP re: bone density and cont'd worsening bladder dysfunction/ UE/LE strength deficits    *primary problems:  - new increased RLE pain (intermittent, but lasts for hours at a time)  - new numbness along L4/5 dermatome    *Prior function:   - walk dogs few miles,   - ride 50cc scooter  - housework: 1/2 day at a time  - able to carry musician equipment  - wood working    *Current function:   - ~15min of housework,   - son needs to carry musician equipment     Imaging:  Impression       1. Mild nonspecific marrow edema involving the base of the T7 spinous process. No correlate is seen on CT chest dated 3/21/2020. Consider CT thoracic spine to exclude a nondisplaced fracture. 2.   Chronic T5, T8, L1 compression fractures. 3.  Thoracic spondylosis resulting in mild T7-8 and T9-10 spinal canal stenoses and mild left T6-7 and T7-8 neural foraminal narrowing. *L1 comp fx was 75% ht loss in Sept 2021  *40% in Jan 2022  *40%  in Sept 2022    Hx: CVA in 2014: L side effected; moderate deficits at that time. SUBJECTIVE:    10/27: saw PCP earlier and feels a lot better about the direction he is headed. We did review his symptoms quite a bit and his biggest concern is new RLE pain and weakness. He is used to the chronic back pain. 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 Typically of late, he has only paid for it w/ pain/ fatigue x 1 day, not 2. He states he feels extremely tired today. After getting ready/ showered, he felt like he could go back to sleep for a long while. States his RLE pain/ numbness actually hasn't been as bad the past few days. Mostly biggest problems of significant low and mid back pain. 11/3: Has been sore since doing the prone and Qped exercises prior session. Was wondering if wearing his back brace during activities may have attributed to it as he will sometimes sleep on his stomach. Patient reports that he has increased his walking at home. F/U with surgeon at the end of the month and f/u with urologist next week.     OBJECTIVE:   Observation:   Test measurements:      RESTRICTIONS/PRECAUTIONS: L1 kyphoplasty, T5 & T8 comp fx, chronic pain, worsening gait/ UE/LE weakness    Exercises/Interventions:     Therapeutic Ex (81163)   Min: Resistance/Reps Notes/Cues   Warm up RESUME  Mod/ severe fatigue/sore after   Mini squats Mod difficulty   SL clamshell 2x10 R/L    Hookly bridge 1x5, 1x10 Mod difficult   TA w/ march X10 R/L    ST hip abd X10 R/L    EOB hip ext X10 R/L         steps 4\" x10 R/L         scaption Seated 2x8, 0-90* bilat, 0#         Seated row Red 2 x 10              Pt edu Reviewed HEP; see below    Therapeutic Activity (02650) Min:      Pt edu Pathology, role of postural endurance work, close chain core stab work                   NMR re-education (37918)   Min:     gait Adjusted cane for proper height. Quad  Very fatigued/ painful             Manual Intervention (69258) Min:                Modalities  Min:             Other Therapeutic Activities:  Pt was educated on PT POC, Diagnosis, Prognosis, pathomechanics as well as frequency and duration of scheduling future physical therapy appointments. Time was also taken on this day to answer all patient questions and participation in PT. Reviewed appointment policy in detail with patient and patient verbalized understanding. Home Exercise Program: Patient instructed in the following for HEP:      10/27: hookly bridge, mini squats, walking program       . Patient verbalized/demonstrated understanding and was issued written handout. Therapeutic Exercise and NMR EXR  [x] (75355) Provided verbal/tactile cueing for activities related to strengthening, flexibility, endurance, ROM  for improvements in proximal hip and core control with self care, mobility, lifting and ambulation. [x] (75514) Provided verbal/tactile cueing for activities related to improving balance, coordination, kinesthetic sense, posture, motor skill, proprioception  to assist with core control in self care, mobility, lifting, and ambulation.      Therapeutic Activities:    [x] (80096 or 38448) Provided verbal/tactile cueing for activities related to improving balance, coordination, kinesthetic sense, posture, motor skill, proprioception and motor activation to allow for proper function  with self care and ADLs  [x] (69732) Provided training and instruction to the patient for proper core and proximal hip recruitment and positioning with ambulation re-education     Home Exercise Program:    [x] (83427) Reviewed/Progressed HEP activities related to strengthening, flexibility, endurance, ROM of core, proximal hip and LE for functional self-care, mobility, lifting and ambulation   [x] (75564) Reviewed/Progressed HEP activities related to improving balance, coordination, kinesthetic sense, posture, motor skill, proprioception of core, proximal hip and LE for self care, mobility, lifting, and ambulation      Manual Treatments:  PROM / STM / Oscillations-Mobs:  G-I, II, III, IV (PA's, Inf., Post.)  [x] (32371) Provided manual therapy to mobilize proximal hip and LS spine soft tissue/joints for the purpose of modulating pain, promoting relaxation,  increasing ROM, reducing/eliminating soft tissue swelling/inflammation/restriction, improving soft tissue extensibility and allowing for proper ROM for normal function with self care, mobility, lifting and ambulation.        Approval Dates:  CPT Code Units Approved Units Used  Date Updated:                     Charges:  Timed Code Treatment Minutes: 38   Total Treatment Minutes: 38     [] EVAL (LOW) 02975 (typically 20 minutes face-to-face)  [] EVAL (MOD) 90914 (typically 30 minutes face-to-face)  [] EVAL (HIGH) 80968 (typically 45 minutes face-to-face)  [] RE-EVAL     [x] NB(81553) x 2    [] Dry needle 1 or 2 Muscles (63571)  [] NMR (05276) x     [] Dry needle 3+ Muscles (18347)  [] Manual (42810) x     [] Ultrasound (91389) x  [x] TA (44418) x     [] Mech Traction (36025)  [] ES(attended) (08400)     [] ES (un) (18242):   [] Vasopump (31608) [] True Karolina (54112)   [] Other:    Ronny Valero stated goal: able to stand/ walk and prepare a meal for family. [] Progressing: [] Met: [] Not Met: [] Adjusted  Therapist goals for Patient:   Short Term Goals: To be achieved in: 2 weeks  1. Independent in HEP and progression per patient tolerance, in order to prevent re-injury. [] Progressing: [] Met: [] Not Met: [] Adjusted  2. Patient will have a decrease in pain to facilitate improvement in movement, function, and ADLs as indicated by Functional Deficits. [] Progressing: [] Met: [] Not Met: [] Adjusted    Long Term Goals: To be achieved in: 6-10 weeks  1. Increase FOTO functional outcome score from 24 to 42 to assist with reaching prior level of function. [] Progressing: [] Met: [] Not Met: [] Adjusted  2. Patient will demonstrate increased AROM to WNL, good LS mobility, good hip ROM to allow for proper joint functioning as indicated by patients Functional Deficits. [] Progressing: [] Met: [] Not Met: [] Adjusted  3. Patient will demonstrate an increase in Strength of lower quarter to be able to rise from chair at least 3x in 30sec. [] Progressing: [] Met: [] Not Met: [] Adjusted  4. Patient will be able to reach arms overhead to at least 130* bilat to get closer to functional mobility. [] Progressing: [] Met: [] Not Met: [] Adjusted  5. Pt will have no episodes of loss of bladder control over a 2 week period. [] Progressing: [] Met: [] Not Met: [] Adjusted     ASSESSMENT:  Jennifer session fair/ poor. Very fatigued at start of session, attempted intervals of treadmill walking, but needed to stop ~ 2 min in. He didn't have many c/o RLE radiating pain today, more focal to lower and mid back. 11/3: Tolerated session fair.  Educated on premise of centralizing symptoms and that sometimes LBP can increase as we move the symptoms out of the extremities     Treatment/Activity Tolerance:  [] Patient tolerated treatment well [x] Patient limited by fatique  [x] Patient limited by pain  [] Patient limited by other medical complications  [] Other:     Overall Progression Towards Functional goals/ Treatment Progress Update:  [] Patient is progressing as expected towards functional goals listed. [] Progression is slowed due to complexities/Impairments listed. [] Progression has been slowed due to co-morbidities. [x] Plan just implemented, too soon to assess goals progression <30days   [] Goals require adjustment due to lack of progress  [] Patient is not progressing as expected and requires additional follow up with physician  [] Other:    Prognosis for POC: [x] Good [] Fair  [] Poor    Patient requires continued skilled intervention: [x] Yes  [] No        PLAN: See eval  [x] Continue per plan of care [] Alter current plan (see comments)  [] Plan of care initiated [] Hold pending MD visit [] Discharge    Electronically signed by: Louie Dave, PTA [id number]      Note: If patient does not return for scheduled/recommended follow up visits, this note will serve as a discharge from care along with the most recent update on progress.

## 2022-11-07 ENCOUNTER — NURSE ONLY (OUTPATIENT)
Dept: CARDIOLOGY CLINIC | Age: 52
End: 2022-11-07
Payer: COMMERCIAL

## 2022-11-07 ENCOUNTER — HOSPITAL ENCOUNTER (OUTPATIENT)
Dept: PHYSICAL THERAPY | Age: 52
Setting detail: THERAPIES SERIES
Discharge: HOME | End: 2022-11-07
Payer: COMMERCIAL

## 2022-11-07 ENCOUNTER — NURSE ONLY (OUTPATIENT)
Dept: CARDIOLOGY CLINIC | Age: 52
End: 2022-11-07

## 2022-11-07 ENCOUNTER — TELEPHONE (OUTPATIENT)
Dept: NON INVASIVE DIAGNOSTICS | Age: 52
End: 2022-11-07

## 2022-11-07 DIAGNOSIS — I48.0 PAF (PAROXYSMAL ATRIAL FIBRILLATION) (HCC): Primary | ICD-10-CM

## 2022-11-07 DIAGNOSIS — G45.9 TIA (TRANSIENT ISCHEMIC ATTACK): Primary | ICD-10-CM

## 2022-11-07 DIAGNOSIS — Z95.818 STATUS POST PLACEMENT OF IMPLANTABLE LOOP RECORDER: ICD-10-CM

## 2022-11-07 DIAGNOSIS — R55 SYNCOPE, UNSPECIFIED SYNCOPE TYPE: ICD-10-CM

## 2022-11-07 PROCEDURE — G2066 INTER DEVC REMOTE 30D: HCPCS | Performed by: INTERNAL MEDICINE

## 2022-11-07 PROCEDURE — 93298 REM INTERROG DEV EVAL SCRMS: CPT | Performed by: INTERNAL MEDICINE

## 2022-11-07 PROCEDURE — 97530 THERAPEUTIC ACTIVITIES: CPT

## 2022-11-07 PROCEDURE — 97110 THERAPEUTIC EXERCISES: CPT

## 2022-11-07 NOTE — PROGRESS NOTES
[address] transmission received 11.07.22 @ 3:31pm from [facility] for patient's ILR. REASON FOR TRANSMISSION  Other (please specify in Additional Notes)  TECHNICAL FINDINGS  No device or lead performance issue(s) observed  ADDITIONAL NOTES  Reason for check: No verbal report   Since last cleared 12-Oct-2021: Based on programmed zones, device detected: 1 Patient activated symptom episode on 25-Oct-2021 6 Pause episodes, most recent on 05-Oct-2022 12 Alexander episodes, most recent on 24-Mar-2022 Battery is ok. NPCP will review. See interrogation under cardiology tab in the [address] [place] [address] [address] field for more details.

## 2022-11-07 NOTE — PROGRESS NOTES
ILR for syncope and CVA. Remote transmission received from patient's ILR monitor at home. Remote Linq report shows 1 Pause detected 10.05.22 @ 17:23, 3sec, w ECG illustrating true pause. No symptom episodes recorded. End of 31-day monitoring period 11/7/22. EP physician to review. See PACEART report under Cardiology tab. Will continue to monitor remotely.

## 2022-11-07 NOTE — TELEPHONE ENCOUNTER
Pt looks to have infected ILR with enlarged reddened area and some slight pus-like drainage that does not look new. He is also having significant pain in the area and would like it removed. Can you please arrange for ILR removal as soon as possible? Thanks.

## 2022-11-07 NOTE — TELEPHONE ENCOUNTER
Precert is not needed. Called patient scheduled ILR explant. Loop Explant  Pre Procedure Instructions     Date: 11/8/2022    Arrive at: 10 am     Procedure time: 11 am      The morning of your procedure you will park in the [facility] and report directly to the cath lab to check in. At the information desk stay right and go all the way to the end of the gabriel, this will take you directly to your check in desk for the cath lab. Pre-Procedure Instructions   Do not eat or drink anything for two hours prior to your procedure. Do not use any lotions, creams or perfume the morning of procedure. Cath lab will provide you with all post procedure instructions. [facility] and form emailed to Payam Pettit in cath lab. Spoke with Sania Hudson in EP lab and advised of add on.

## 2022-11-07 NOTE — FLOWSHEET NOTE
[facility]  [address] [place] [address]  Phone: [phone]   Fax:     [phone]    Physical Therapy Treatment Note/ Progress Report:     Date:  2022    Patient Name:  Сергей Sparks    :  1970  MRN: [id number]    Pertinent Medical History:      Medical/Treatment Diagnosis Information:  Medical Diagnosis: Compression fracture of L1 lumbar vertebra ([facility] [address] [D55.169H]  Treatment Diagnosis: back pain, limited mobility, activity tolerance, gait dysfunction, bladder dysfunction    Insurance/Certification information:     Physician Information:  Shu Toro MD  Plan of care signed (Y/N):     Date of Patient follow up with Physician:      Progress Report: []  Yes  [x]  No     Date Range for reporting period:  Beginning: 10/24/2022  Ending:    Progress report due (10 Rx/or 30 days whichever is less): #48     Recertification due (POC duration/ or 90 days whichever is less):     Visit # POC/ Insurance Allowable Auth Needed   5 /  BMN []Yes    []No     Functional Outcomes Measure:   Date Assessed: at eval  Test:FOTO  Score:    Pain level:  /10     History of Injury: Pt here for evaluation for c/o back and R leg pain. He reports a long hx of a chronic pain, but about a year ago, he had a fall and he sustained compression fracture of L1 and had kyphoplasty around March. He wore a lumbar brace up until a few months ago. That seemed to help with pain for a month or two, but then things started to get bad. He now reports for the past 4-6 weeks that he had entire RLE pain numbness into his foot. He also reports along that same time frame that he has many instances at night where he loses control of his bladder, and also has difficulty raising arms overhead which apparently is somewhat new.  He has been very diligent about setting an alarm to go to the restroom every 2 hours to prevent accidents during the day. The past few weeks, he reports increased episodes of bed wetting at night. He last saw spine surgeon a few weeks ago regarding bed wetting and it was recommended to try epidural and possibly surgery. He apparently was denied epidurals and was told to try PT first by his insurance. He is ambulating w/ cane, but states his gait has worsened lately and he has considered using a walker at times. After initial injury, he states compression fracture was around 10% compressed, now he states its around 40% and he was told when it reaches 50%, he has to have surgery due to pressure on spinal cord. Next f/u w/ surgeon isn't till middle of next month. He also has osteoporosis and he attributes that to malnutrition from his bypass surgery which was many years ago. He has tried taking calcium/ D supplements and also tried a Rx medicine for osteoporosis, but per his recent Dexa scans, he states his numbers didn't change much. He is going to follow up with gastro doctor next month sometime. He is wearing a TLSO and was told to wear it as much as he can. He was wearing a lumbar support after surgery, but now has this TLSO. He walks w/ a cane. At his last f/u w/ surgeon, he had just started to have severe pain and also loss of bladder control; per his report this has worsened a great deal since that time. He has tried to contact their office, but only got thru to his . *RLE pain: >75% of the day. Relieved only partially w/ pain meds. Worse in the morning; sleeps in his TLSO due to following instructions to wear it as much as possible. 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       *future: discuss diet (vegan?)  *PT recommended f/u w/ PCP re: bone density and cont'd worsening bladder dysfunction/ UE/LE strength deficits    *primary problems:  - new increased RLE pain (intermittent, but lasts for hours at a time)  - new numbness along L4/5 dermatome    *Prior function:   - walk dogs 2 miles, 2x/day   - ride 50cc scooter  - housework: 1/2 day at a time  - able to carry musician equipment  - wood working    *Current function:   - ~15min of housework,   - son needs to carry musician equipment (40lbs) (last able to do this 1+ yrs ago)     Imaging:  Impression       1. Mild nonspecific marrow edema involving the base of the T7 spinous process. No correlate is seen on CT chest dated 3/21/2020. Consider CT thoracic spine to exclude a nondisplaced fracture. 2.   Chronic T5, T8, L1 compression fractures. 3.  Thoracic spondylosis resulting in mild T7-8 and T9-10 spinal canal stenoses and mild left T6-7 and T7-8 neural foraminal narrowing. *L1 comp fx was 75% ht loss in Sept 2021  *40% in Jan 2022  *40%  in Sept 2022    Hx: CVA in 2014: L side effected; moderate deficits at that time. SUBJECTIVE:    10/27: saw PCP earlier and feels a lot better about the direction he is headed. We did review his symptoms quite a bit and his biggest concern is new RLE pain and weakness. He is used to the chronic back pain. 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 Typically of late, he has only paid for it w/ pain/ fatigue x 1 day, not 2. He states he feels extremely tired today. After getting ready/ showered, he felt like he could go back to sleep for a long while. States his RLE pain/ numbness actually hasn't been as bad the past few days. Mostly biggest problems of significant low and mid back pain. 11/3: Has been sore since doing the prone and Qped exercises prior session. Was wondering if wearing his back brace during activities may have attributed to it as he will sometimes sleep on his stomach. Patient reports that he has increased his walking at home. F/U with surgeon at the end of the month and f/u with urologist next week. 11/7: RLE pain about the same, but moving better.  He has already walked about 1/2 to 1 mile this AM walking dogs. He also reports gait stability feels better. OBJECTIVE:   Observation:   Test measurements:      RESTRICTIONS/PRECAUTIONS: L1 kyphoplasty, T5 & T8 comp fx, chronic pain, worsening gait/ UE/LE weakness    Exercises/Interventions:     Therapeutic Ex (00024)   Min: Resistance/Reps Notes/Cues   Warm up/ cardio Treadmill 1. 2mph x 6'    Mini squats @ table x 10 Mild cues   SL clamshell    Hookly bridge Mod difficult        ST hip abd    EOB hip ext resume        Step up 8\" x10 R,L 1 rail contralat hand        scaption Seated x10 0-90* bilat, 2# Quite fatigued; NV try 1# bilat   Wall push up 2x10 Mod fatigue   Seated row Blue 2 x 10    Lateral walk >future         Pt edu Reviewed HEP; see below Pt deferred handout   Therapeutic Activity (33066) Min:      Pt edu Pathology, role of gradual progressive functional strength                   NMR re-education (70680)   Min:     gait Adjusted cane for proper height. Quad  Very fatigued/ painful             Manual Intervention (15596) Min:                Modalities  Min:             Other Therapeutic Activities:  Pt was educated on PT POC, Diagnosis, Prognosis, pathomechanics as well as frequency and duration of scheduling future physical therapy appointments. Time was also taken on this day to answer all patient questions and participation in PT. Reviewed appointment policy in detail with patient and patient verbalized understanding. Home Exercise Program: Patient instructed in the following for HEP:      10/27: hookly bridge, mini squats, walking program  11/7: mini squat, wall push up, seated TB row, seated scaption (1#), walking program.       . Patient verbalized/demonstrated understanding and was issued written handout.       Therapeutic Exercise and NMR EXR  [x] (21640) Provided verbal/tactile cueing for activities related to strengthening, flexibility, endurance, ROM  for improvements in proximal hip and core control with self care, mobility, lifting and ambulation. [x] (81477) Provided verbal/tactile cueing for activities related to improving balance, coordination, kinesthetic sense, posture, motor skill, proprioception  to assist with core control in self care, mobility, lifting, and ambulation. Therapeutic Activities:    [x] (14057 or 30401) Provided verbal/tactile cueing for activities related to improving balance, coordination, kinesthetic sense, posture, motor skill, proprioception and motor activation to allow for proper function  with self care and ADLs  [x] (47671) Provided training and instruction to the patient for proper core and proximal hip recruitment and positioning with ambulation re-education     Home Exercise Program:    [x] (07854) Reviewed/Progressed HEP activities related to strengthening, flexibility, endurance, ROM of core, proximal hip and LE for functional self-care, mobility, lifting and ambulation   [x] (31207) Reviewed/Progressed HEP activities related to improving balance, coordination, kinesthetic sense, posture, motor skill, proprioception of core, proximal hip and LE for self care, mobility, lifting, and ambulation      Manual Treatments:  PROM / STM / Oscillations-Mobs:  G-I, II, III, IV (PA's, Inf., Post.)  [x] (11426) Provided manual therapy to mobilize proximal hip and LS spine soft tissue/joints for the purpose of modulating pain, promoting relaxation,  increasing ROM, reducing/eliminating soft tissue swelling/inflammation/restriction, improving soft tissue extensibility and allowing for proper ROM for normal function with self care, mobility, lifting and ambulation.        Approval Dates:  CPT Code Units Approved Units Used  Date Updated:                     Charges:  Timed Code Treatment Minutes: 38   Total Treatment Minutes: 38     [] EVAL (LOW) 28407 (typically 20 minutes face-to-face)  [] EVAL (MOD) 02146 (typically 30 minutes face-to-face)  [] EVAL (HIGH) 78594 (typically 45 minutes face-to-face)  [] RE-EVAL     [x] OP(02107) x 2    [] Dry needle 1 or 2 Muscles (63097)  [] NMR (55674) x     [] Dry needle 3+ Muscles (99480)  [] Manual (41787) x     [] Ultrasound (52246) x  [x] TA (40934) x     [] Mech Traction (78855)  [] ES(attended) (67897)     [] ES (un) (01865):   [] Vasopump (96399) [] Ionto (05792)   [] Other:    Era Dub stated goal: able to stand/ walk and prepare a meal for family. [] Progressing: [] Met: [] Not Met: [] Adjusted  Therapist goals for Patient:   Short Term Goals: To be achieved in: 2 weeks  1. Independent in HEP and progression per patient tolerance, in order to prevent re-injury. [] Progressing: [] Met: [] Not Met: [] Adjusted  2. Patient will have a decrease in pain to facilitate improvement in movement, function, and ADLs as indicated by Functional Deficits. [] Progressing: [] Met: [] Not Met: [] Adjusted    Long Term Goals: To be achieved in: 6-10 weeks  1. Increase FOTO functional outcome score from 24 to 42 to assist with reaching prior level of function. [] Progressing: [] Met: [] Not Met: [] Adjusted  2. Patient will demonstrate increased AROM to WNL, good LS mobility, good hip ROM to allow for proper joint functioning as indicated by patients Functional Deficits. [] Progressing: [] Met: [] Not Met: [] Adjusted  3. Patient will demonstrate an increase in Strength of lower quarter to be able to rise from chair at least 3x in 30sec. [] Progressing: [] Met: [] Not Met: [] Adjusted  4. Patient will be able to reach arms overhead to at least 130* bilat to get closer to functional mobility. [] Progressing: [] Met: [] Not Met: [] Adjusted  5. Pt will have no episodes of loss of bladder control over a 2 week period. [] Progressing: [] Met: [] Not Met: [] Adjusted     ASSESSMENT:  Pt needs skilled PT to restore gross upper/ lower quarter functional strength and promote increased activity tolerance. Jennifer session well today.  No great deal of incr soreness at end. Treatment/Activity Tolerance:  [] Patient tolerated treatment well [x] Patient limited by fatique  [x] Patient limited by pain  [] Patient limited by other medical complications  [] Other:     Overall Progression Towards Functional goals/ Treatment Progress Update:  [] Patient is progressing as expected towards functional goals listed. [] Progression is slowed due to complexities/Impairments listed. [] Progression has been slowed due to co-morbidities. [x] Plan just implemented, too soon to assess goals progression <30days   [] Goals require adjustment due to lack of progress  [] Patient is not progressing as expected and requires additional follow up with physician  [] Other:    Prognosis for POC: [x] Good [] Fair  [] Poor    Patient requires continued skilled intervention: [x] Yes  [] No        PLAN: See eval  [x] Continue per plan of care [] Alter current plan (see comments)  [] Plan of care initiated [] Hold pending MD visit [] Discharge    Electronically signed by: Jeff Morgan PT , DPT  #[id number]        Note: If patient does not return for scheduled/recommended follow up visits, this note will serve as a discharge from care along with the most recent update on progress.

## 2022-11-08 ENCOUNTER — HOSPITAL ENCOUNTER (OUTPATIENT)
Dept: CARDIAC CATH/INVASIVE PROCEDURES | Age: 52
Discharge: HOME | End: 2022-11-08
Payer: COMMERCIAL

## 2022-11-08 PROCEDURE — 33286 RMVL SUBQ CAR RHYTHM MNTR: CPT

## 2022-11-08 PROCEDURE — 33286 RMVL SUBQ CAR RHYTHM MNTR: CPT | Performed by: INTERNAL MEDICINE

## 2022-11-08 PROCEDURE — 87077 CULTURE AEROBIC IDENTIFY: CPT

## 2022-11-08 PROCEDURE — 87205 SMEAR GRAM STAIN: CPT

## 2022-11-08 PROCEDURE — 2500000003 HC RX 250 WO HCPCS

## 2022-11-08 PROCEDURE — 87075 CULTR BACTERIA EXCEPT BLOOD: CPT

## 2022-11-08 PROCEDURE — 87070 CULTURE OTHR SPECIMN AEROBIC: CPT

## 2022-11-08 PROCEDURE — 87186 SC STD MICRODIL/AGAR DIL: CPT

## 2022-11-08 RX ORDER — DOXYCYCLINE HYCLATE 100 MG
100 TABLET ORAL 2 TIMES DAILY
Qty: 14 TABLET | Refills: 0 | Status: SHIPPED | OUTPATIENT
Start: 2022-11-08 | End: 2022-11-15

## 2022-11-08 NOTE — DISCHARGE INSTRUCTIONS
LOOP RECORDER REMOVAL          Keep dressing in place Left chest wall until your follow up visit with doctor. Keep dressing dry     May shower as long as keep back to shower.     CAN REMOVE SAFE GUARD TOMORROW AT 1030 AM      Please contact the office if you notice any signs of infection:  [phone]      Redness / swelling at the incision site  Fever  Yellow drainage at the site  Pain                                                                                            Electronically signed by Rosas Rodriges RN on 11/8/2022 at 10:31 AM

## 2022-11-08 NOTE — PROCEDURES
[facility]     Electrophysiology Procedure Note       Date of Procedure: 11/8/2022  Patient's Name: Valentine Sher  YOB: 1970   Medical Record Number: [id number]  Referring Physician: Patricia att. providers found  Procedure Performed by: Lilli Perez MD    Procedure performed:    Removal of implantable loop recorder     Indications for procedure:      Valentine Sher 47 y.o. male with PMH stroke and bradycardia s/p ILR implant. Device pocket infection    Loop recorder removal:   The patient was brought to the electrophysiology laboratory in stable condition. The patient was in a fasting and non-sedated state. The risks, benefits and alternatives of the procedure were discussed with the patient. The risks including, but not limited to, the risks of bleeding, infection, injury to surrounding structures, were discussed in detail. Patient opted to proceed with the device implantation. Written informed consent was signed and placed in the chart. Prophylactic antibiotic was given. The patient was prepped and draped in a sterile fashion. A timeout protocol was completed to identify the patient and the procedure being performed. Chest was prepped and draped in the usual sterile fashion. After injection of 2% lidocaine, the old loop was easily came out without any incision. Pus then serosangunous exudate came from the pocket. Cultures were obtained. An incision was made around the infected skin to get clear margins. The pocket was then closed using  4-0 Vicryl. The skin was covered with Steri-Strips and pressure dressing. All sponge and needle counts were reported as correct at the end of the procedure. The patient tolerated the procedure well and there were no complications. The patient tolerated the procedure well and there were no complications. Post-sedation evaluation was completed. Patient was transported to the holding area in stable condition.    Blood loss <5 cc  No complication  Conclusion:   Removal of implantable loop recorder     Plan:Discharge in 20 minutes  Antibiotics for 1 week. Return for wound check in 1 week.      Trevor Leal MD,   Cardiac Electrophysiology  [address], [facility] [address]  [phone]

## 2022-11-08 NOTE — H&P
Cardiac Electrophysiology H&P     Date: 11/8/2022  Admit Date:  11/8/2022    Reason for Consultation: infected ILR      History of Present Illness  Kate Aguillon is a 47y.o. year old male with past medical history significant for bradycardia, syncope s/p ILR placed in October 2021, stroke, depression, renal cell carcinoma (s/p nephrectomy 2018), splenic abscess (s/p splenectomy), OSCAR, morbid obesity (s/p gastric bypass 2009), multiple SBOs, intestinal adhesions, and GERD. He was seen in the office on 10/12/22 due to an \"infected hair\" over his ILR site. There was a small area of redness over the medial aspect of where the ILR is but the area was not open. It was not tender. Over the last couple days, he noticed a \"scab\" and felt it had come open over the last couple days. He was seen in the office yesterday and the site had what looked like old dried pus on it. It was not visibly open but it was very tender to palpitation. It was decided to explant his ILR and the pt agreed. ILR was interrogated on 11/7/22, showed some episodes of bradycardia during nighttime hours. No arrhthymias, no symptomatic pauses or symptomatic bradycardia.        Past Medical History:   Diagnosis Date    Alcoholism ([address]     last drink 2015    Allergic migraine with status migrainosus     Allergic rhinitis, seasonal     Anemia     Arthritis     right knee    Vaughn's esophagus     Benign intracranial hypertension     Cancer (HCC)     renal     Carpal tunnel syndrome     Chronic pain     Depression     Ear infection 05/06/2022    GERD (gastroesophageal reflux disease)     Headache(784.0)     History of blood transfusion     History of tobacco use     Quit 7/2014    Migraine     chronic for 1 year    Morbid obesity ([address]     Movement disorder     Onychomycosis     Sleep apnea, obstructive     Severe uses cpap stop bang 6    Unspecified cerebral artery occlusion with cerebral infarction 2014    slight weakness in left arm    Wears dentures OF EXPLANT AND TOTAL KNEE REPLACEMENT performed by David Larson MD at [address] [facility]  10/15/13    RIGHT    TOTAL KNEE ARTHROPLASTY Right 8/12/2020    RIGHT ARTHROPLASY  RESECTION WITH INSERTION OF SPACER performed by Melissa Gregory MD at [facility]  6-7-2016    UPPER GASTROINTESTINAL ENDOSCOPY  04/02/2018    UPPER GASTROINTESTINAL ENDOSCOPY N/A 3/24/2022    ESOPHAGOGASTRODUODENOSCOPY performed by Liseth Bustos MD at [address]  5/31/2022    ESOPHAGOGASTRODUODENOSCOPY WITH BIOPSY performed by Domingo Almanzar MD at [address]       Current Outpatient Medications   Medication Instructions    atorvastatin (LIPITOR) 40 MG tablet TAKE 1 TABLET BY MOUTH EVERYDAY AT BEDTIME    calcium-vitamin D (OSCAL-500) 500-200 MG-UNIT per tablet 1 tablet, Oral, 2 TIMES DAILY    clopidogrel (PLAVIX) 75 MG tablet TAKE 1 TABLET BY MOUTH EVERY DAY    cyanocobalamin 1000 MCG/ML injection IntraMUSCular    FLUoxetine (PROZAC) 20 mg, Oral, DAILY    gabapentin (NEURONTIN) 600 mg, Oral, 4 TIMES DAILY    Multiple Vitamin TABS 1 tablet, Oral, DAILY    ondansetron (ZOFRAN ODT) 4 mg, Oral, EVERY 8 HOURS PRN    oxyCODONE (OXYCONTIN) 10 mg, Oral, 2 TIMES DAILY, Intended supply: 30 days    pantoprazole (PROTONIX) 40 MG tablet TAKE 1 TABLET BY MOUTH TWICE A DAY    sildenafil (REVATIO) 80 mg, Oral, PRN    traZODone (DESYREL) 200 mg, Oral, NIGHTLY        Allergies   Allergen Reactions    Nsaids Nausea Only and Other (See Comments)     Hx of Barretts esophagus      Morphine Hives and Itching     Pt gets Hives/itching.  Does not tolerate     Prochlorperazine Other (See Comments)     No allergic reaction, patient reported sense of \"restlessness\" and fidgiting    Valtrex [Valacyclovir Hcl] Diarrhea    Fentanyl Itching    Morphine And Related Hives and Itching    Tolmetin Nausea Only     Hx of Barretts esophagus       Social History: reports that he quit smoking about 18 months ago. His smoking use included cigarettes. He started smoking about 37 years ago. He has a 12.50 pack-year smoking history. He has never used smokeless tobacco. He reports current drug use. Drug: Marijuana Juan Carlos Cherise). He reports that he does not drink alcohol. Family History:  family history includes Arthritis in his mother; Cancer in his father; Diabetes in his maternal uncle; Heart Disease in his maternal uncle and maternal uncle. Review of Systems:  General: negative for fever, chills   Ophthalmic ROS: negative for eye pain or loss of vision  ENT ROS: negative for headaches, sore throat, nasal drainage  Respiratory: negative for cough, sputum, SOB  Cardiovascular: negative for chest pain, palpitations. Gastrointestinal: negative for abdominal pain, diarrhea, N/V  Hematology: negative for bleeding, blood clots, bruising or jaundice  Genito-Urinary:  negative for dysuria or incontinence  Musculoskeletal: negative for joint swelling, muscle pain  Neurological: negative for confusion, dizziness, headaches   Psychiatric: negative anxiety, depression  Dermatological: positive for redness over medial aspect of ILR, tenderness over site as well, negative for rash    Medications:  Scheduled Meds:   Continuous Infusions:  PRN Meds:.     Physical Examination:  There were no vitals filed for this visit. No intake or output data in the 24 hours ending 11/08/22 1002  No intake/output data recorded. Wt Readings from Last 3 Encounters:   10/27/22 208 lb (94.3 kg)   09/29/22 205 lb (93 kg)   06/22/22 201 lb (91.2 kg)     No data recorded    Constitutional: Alert, in no acute distress. Appears stated age. Head: Normocephalic and atraumatic. Eyes: Conjunctivae normal. EOM are normal.   Neck: Neck supple. No lymphadenopathy. No rigidity. No JVD present. Cardiovascular: Normal rate, regular rhythm. No murmurs, rubs or gallops.  No S3 or S4.  Pulmonary/Chest: Clear breath sounds bilaterally. No crackles, wheezes or rhonchi. No respiratory accessory muscle use. Abdominal: Soft. Normal bowel sounds present. No distension, No tenderness. Musculoskeletal: No tenderness. No edema    Lymphadenopathy: Has no cervical adenopathy. Neurological: Alert and oriented. No gross deficits. Skin: Small reddened area over medial aspect of ILR, small area of old pus-like drainage. Skin is warm and dry. No rash, lesions, ulcerations noted. Psychiatric: No anxiety nor agitation. Labs:  Reviewed. No results for input(s): NA, K, CL, CO2, PHOS, BUN, CREATININE, CA in the last 72 hours. No results for input(s): WBC, HGB, HCT, MCV, PLT in the last 72 hours. Lab Results   Component Value Date/Time    CKTOTAL 56 09/07/2020 01:00 PM    CKMB 0.9 06/18/2014 08:00 PM    TROPONINI <0.01 03/23/2022 10:04 AM     Lab Results   Component Value Date/Time     10/13/2020 08:15 AM     Lab Results   Component Value Date/Time    PROTIME 13.2 08/05/2021 06:49 AM    PROTIME 13.4 02/09/2021 08:48 AM    PROTIME 13.0 08/06/2020 09:45 AM    INR 1.16 08/05/2021 06:49 AM    INR 1.15 02/09/2021 08:48 AM    INR 1.12 08/06/2020 09:45 AM     Lab Results   Component Value Date/Time    CHOL 106 02/02/2021 09:13 AM    HDL 52 02/02/2021 09:13 AM    TRIG 103 02/02/2021 09:13 AM       Diagnostic and imaging results reviewed. ECG: 3/22/22  SB at 41 BPM. Atrial bigeminy. Echo: 4/27/22   Normal left ventricle size, wall thickness, and systolic function with an   estimated ejection fraction of 55-60%. No regional wall motion abnormalities   are seen. Normal diastolic function. The aortic root is normal in size. The ascending aorta is mildly dilated at 4.1 cms   The right ventricle is normal in size and function.      EP Procedure: 10/12/2021  ILR placed by Dr. Jerardo Dior  Indication: syncope    Assessment & Plan:    Infected loop recorder   - ILR site appears infected   - plan for ILR explant today   - doxycycline 100mg BID x 7 days   - will follow up with Dr. Glory Musa to discuss if he needs ILR re-implanted    Sinus bradycardia   - history of this and seen on ILR checks, mostly at nighttime hours   - no symptomatic episodes    History of CVA   - also with PFO s/p closure 8/21 with Dr. Hernandez Laser so far has not shown any atrial fibrillation/flutter    Discussed with Dr. Ekta Louie.     LILLIE Mtz  [facility], [address] [place] [address], [address] [place] [address]  Phone: [phone]  Fax: [phone]    Electronically signed by LILLIE Suazo - CNP on 11/8/2022 at 10:02 AM

## 2022-11-08 NOTE — H&P
H&P Update    I have reviewed the history and physical from earlier today and examined the patient and find no relevant changes. I have reviewed with the patient and/or family the risks, benefits, and alternatives to the procedure. Pre-sedation Assessment    Patient:  Elgin Mujica   :   1970  Intended Procedure: Loop explant    Nurses notes reviewed and agreed. Medications reviewed  Allergies: Allergies   Allergen Reactions    Nsaids Nausea Only and Other (See Comments)     Hx of Barretts esophagus      Morphine Hives and Itching     Pt gets Hives/itching.  Does not tolerate     Prochlorperazine Other (See Comments)     No allergic reaction, patient reported sense of \"restlessness\" and fidgiting    Valtrex [Valacyclovir Hcl] Diarrhea    Fentanyl Itching    Morphine And Related Hives and Itching    Tolmetin Nausea Only     Hx of Barretts esophagus       Pre-Procedure Assessment/Plan:  ASA 2 - Patient with mild systemic disease with no functional limitations    Level of Sedation Plan: local    Post Procedure plan: Return to same level of care    Katerine Reilly MD  Cardiac Electrophysiology  [facility]

## 2022-11-09 LAB — URINE CULTURE, ROUTINE: NORMAL

## 2022-11-10 ENCOUNTER — OFFICE VISIT (OUTPATIENT)
Dept: FAMILY MEDICINE CLINIC | Age: 52
End: 2022-11-10
Payer: COMMERCIAL

## 2022-11-10 ENCOUNTER — HOSPITAL ENCOUNTER (OUTPATIENT)
Dept: PHYSICAL THERAPY | Age: 52
Setting detail: THERAPIES SERIES
Discharge: HOME | End: 2022-11-10
Payer: COMMERCIAL

## 2022-11-10 VITALS
WEIGHT: 198 LBS | SYSTOLIC BLOOD PRESSURE: 110 MMHG | RESPIRATION RATE: 12 BRPM | OXYGEN SATURATION: 97 % | HEART RATE: 51 BPM | DIASTOLIC BLOOD PRESSURE: 70 MMHG | BODY MASS INDEX: 26.85 KG/M2

## 2022-11-10 DIAGNOSIS — K86.81 EXOCRINE PANCREATIC INSUFFICIENCY: ICD-10-CM

## 2022-11-10 DIAGNOSIS — S32.010A CLOSED COMPRESSION FRACTURE OF BODY OF L1 VERTEBRA (HCC): ICD-10-CM

## 2022-11-10 DIAGNOSIS — K42.9 UMBILICAL HERNIA WITHOUT OBSTRUCTION AND WITHOUT GANGRENE: ICD-10-CM

## 2022-11-10 DIAGNOSIS — K85.00 IDIOPATHIC ACUTE PANCREATITIS WITHOUT INFECTION OR NECROSIS: Primary | ICD-10-CM

## 2022-11-10 LAB
GRAM STAIN RESULT: ABNORMAL
ORGANISM: ABNORMAL
WOUND/ABSCESS: ABNORMAL

## 2022-11-10 PROCEDURE — 97110 THERAPEUTIC EXERCISES: CPT

## 2022-11-10 PROCEDURE — 99214 OFFICE O/P EST MOD 30 MIN: CPT | Performed by: FAMILY MEDICINE

## 2022-11-10 PROCEDURE — 90471 IMMUNIZATION ADMIN: CPT | Performed by: FAMILY MEDICINE

## 2022-11-10 PROCEDURE — 90674 CCIIV4 VAC NO PRSV 0.5 ML IM: CPT | Performed by: FAMILY MEDICINE

## 2022-11-10 RX ORDER — PANCRELIPASE 60000; 12000; 38000 [USP'U]/1; [USP'U]/1; [USP'U]/1
12000 CAPSULE, DELAYED RELEASE PELLETS ORAL
Qty: 90 CAPSULE | Refills: 1 | Status: SHIPPED | OUTPATIENT
Start: 2022-11-10 | End: 2022-12-10

## 2022-11-10 NOTE — PROGRESS NOTES
11/10/2022    Blood pressure 110/70, pulse 51, resp. rate 12, weight 198 lb (89.8 kg), SpO2 97 %. Jess Richardson (:  1970) is a 47 y.o. male, here for evaluation of the following medical concerns:    Chief Complaint   Patient presents with    Follow-up     ED f/u compression fracture in back     ED visit  due to 'hard spot' at umbilicus that was painful. Also lost control of bowels earlier that day. CT was neg. There was a fat- containing hernia periumbilical. No other acute findings with imaging. Labs showed no leukocytosis. Mild anemia. Lipase was elevated at 223 (nl 11-82)    Has f/u with DR Isabel Cuello about this. The umbilical mass is . Increases with sitting and leaning forward. Up to 9/10.  7/10 at other times. Has not had 'zero' pain since this started on the  or . He does not struggle with diarrhea. Unless he eats a high fat diet - like fast foods that are fried- this will cause oily diarrhea. With 'fat floating on the surface of the water'    His fecal incontinence he does not believe was after a high fat meal.  It was more fecal urgency as he was moving bowels of formed stool. He also had an infected loop recorder removed on the . Cultured SA from wound site, resistance to clinda and e-mycin only. No new problems. Planning to see Dr Tony Aviles for severe osteoporosis with recurrent vetrbral compression fractures. Saw urology (Dr Queenie Dorsey) for urinary problems- had no problems emptying. Thinks his symptoms might be neurologic. 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     Chronic pain syndrome- abdominal and head    History of stroke: right insular cortex on June 8, 2014 (small PFO; hypergoag w/u neg,  neurology)    Gastroesophageal reflux disease with esophagitis--on daily ppi    Intractable chronic migraine without aura and with status migrainosus    Iron deficiency anemia    B12 deficiency    Malignant neoplasm of left kidney ([facility] clear cell. 8/1/18 Dr Ramonita Neves. History of depression    [facility] of [facility]    History of total knee arthroplasty, right    Hematoma of right knee region    TIA (transient ischemic attack) LUE weakness 3/21    Tobacco use    Acute deep vein thrombosis (DVT) of calf muscle vein of both lower extremities (HCC)    Closed compression fracture of body of L1 vertebra (HCC)    Osteopenia of multiple sites    Current smoker    Intractable nausea and vomiting    Pain syndrome, chronic        Body mass index is 26.85 kg/m². Wt Readings from Last 3 Encounters:   11/10/22 198 lb (89.8 kg)   10/27/22 208 lb (94.3 kg)   09/29/22 205 lb (93 kg)       BP Readings from Last 3 Encounters:   11/10/22 110/70   10/27/22 120/70   09/29/22 114/72       Allergies   Allergen Reactions    Nsaids Nausea Only and Other (See Comments)     Hx of Barretts esophagus      Morphine Hives and Itching     Pt gets Hives/itching. Does not tolerate     Prochlorperazine Other (See Comments)     No allergic reaction, patient reported sense of \"restlessness\" and fidgiting    Valtrex [Valacyclovir Hcl] Diarrhea    Fentanyl Itching    Morphine And Related Hives and Itching    Tolmetin Nausea Only     Hx of Barretts esophagus       Prior to Visit Medications    Medication Sig Taking?  Authorizing Provider   doxycycline hyclate (VIBRA-TABS) 100 MG tablet Take 1 tablet by mouth 2 times daily for 7 days Yes Tameka Russ APRN - CNP   cyanocobalamin 1000 MCG/ML injection Inject into the muscle Yes Historical Provider, MD   oxyCODONE (OXYCONTIN) 10 MG extended release tablet Take 1 tablet by mouth 2 times daily for 30 days. Intended supply: 30 days Yes Brady Landrum MD   atorvastatin (LIPITOR) 40 MG tablet TAKE 1 TABLET BY MOUTH EVERYDAY AT BEDTIME Yes Brady Landrum MD   pantoprazole (PROTONIX) 40 MG tablet TAKE 1 TABLET BY MOUTH TWICE A DAY Yes Brady Landrum MD   traZODone (DESYREL) 100 MG tablet Take 2 tablets by mouth nightly Yes Brady Landrum MD   FLUoxetine (PROZAC) 20 MG capsule Take 1 capsule by mouth daily Yes Brady Landrum MD   clopidogrel (PLAVIX) 75 MG tablet TAKE 1 TABLET BY MOUTH EVERY DAY Yes Kade Acosta MD   ondansetron (ZOFRAN ODT) 4 MG disintegrating tablet Take 1 tablet by mouth every 8 hours as needed for Nausea Yes Radha Cee MD   gabapentin (NEURONTIN) 600 MG tablet Take 600 mg by mouth 4 times daily. Yes Historical Provider, MD   sildenafil (REVATIO) 20 MG tablet Take 4 tablets by mouth as needed (30 min prior to intercourse) Yes Brady Landrum MD   calcium-vitamin D (Trudee Marisela) 500-200 MG-UNIT per tablet Take 1 tablet by mouth 2 times daily  Yes Historical Provider, MD   Multiple Vitamin TABS Take 1 tablet by mouth daily  Yes Historical Provider, MD        Social History     Tobacco Use    Smoking status: Every Day     Packs/day: 0.50     Years: 25.00     Pack years: 12.50     Types: Cigarettes     Start date: 1985     Last attempt to quit: 2021     Years since quittin.5    Smokeless tobacco: Never    Tobacco comments:     Patient vapes   Vaping Use    Vaping Use: Former    Substances: Nicotine, THC    Devices: Refillable tank, Pre-filled pod   Substance Use Topics    Alcohol use: No     Alcohol/week: 0.0 standard drinks     Comment: last drink     Drug use: Yes     Types: Marijuana Katheren Ling)     Comment: medical luke card       Review of Systems    Physical Exam  Vitals and nursing note reviewed. Constitutional:       General: He is not in acute distress. Appearance: Normal appearance. He is well-developed.  He is not diaphoretic. Cardiovascular:      Rate and Rhythm: Normal rate and regular rhythm. Pulses: Normal pulses. Heart sounds: Normal heart sounds. No murmur heard. Pulmonary:      Effort: Pulmonary effort is normal. No respiratory distress. Breath sounds: Normal breath sounds. No wheezing or rales. Abdominal:      General: There is no distension. Palpations: There is mass (2.5 cm mobile tender firm mass R superolateral to umbilicus). Musculoskeletal:      Cervical back: Normal range of motion. Right lower leg: No edema. Left lower leg: No edema. Skin:     General: Skin is warm and dry. Neurological:      General: No focal deficit present. Mental Status: He is alert and oriented to person, place, and time. Mental status is at baseline. Psychiatric:         Mood and Affect: Mood normal.         Behavior: Behavior normal.         Thought Content: Thought content normal.         Judgment: Judgment normal.       ASSESSMENT/PLAN:    1. Idiopathic acute pancreatitis without infection or necrosis  - high lipase without CT evidence of pancreatitis. ? Passed a gallstone  Retest lipase now. - Lipase; Future    2. Exocrine pancreatic insufficiency  - discussed that this may or may not be related to pancreatitis. No hx prior pancreatitis though. Try creon prior to seeing DR arce. Limit high fat foods    3. Umbilical hernia without obstruction and without gangrene  - this is tender and may need to be removed simply due to pain. He is on doxy for his staph infection, perhaps it will help this also. - Raad Hudson MD, General Surgery, [facility] - [facility]    4. Closed compression fracture of body of L1 vertebra (HCC)  - to see dr marks for osteoporosis. In a brace per Rogelio. I am managing opioid therapy for now. He is unable to find controlled release oxycontin in pharmacies (back order?). Will need to change to immediate release with next refill. Not due today. OARRS report reviewed; is consistent with prescribed use and free of suspicious activity. Return if symptoms worsen or fail to improve. An  electronicsignature was used to authenticate this note.     --Kenia Harding MD on 11/10/2022 at 9:50 AM

## 2022-11-10 NOTE — FLOWSHEET NOTE
[facility], [facility]  [address] [place], [facility]  Phone: [phone]   Fax:     [phone]    Physical Therapy Treatment Note/ Progress Report:     Date:  11/10/2022    Patient Name:  Dana Rose    :  1970  MRN: [id number]    Pertinent Medical History:      Medical/Treatment Diagnosis Information:  Medical Diagnosis: Compression fracture of L1 lumbar vertebra ([address] [S94.127Q]  Treatment Diagnosis: back pain, limited mobility, activity tolerance, gait dysfunction, bladder dysfunction    Insurance/Certification information:     Physician Information:  Ana Laura Lugo MD  Plan of care signed (Y/N):     Date of Patient follow up with Physician:      Progress Report: []  Yes  [x]  No     Date Range for reporting period:  Beginning: 10/24/2022  Ending:    Progress report due (10 Rx/or 30 days whichever is less): #20     Recertification due (POC duration/ or 90 days whichever is less):     Visit # POC/ Insurance Allowable Auth Needed   6 /  Daiana Begin []Yes    []No     Functional Outcomes Measure:   Date Assessed: at eval  Test:FOTO  Score:    Pain level:  /10     History of Injury: Pt here for evaluation for c/o back and R leg pain. He reports a long hx of a chronic pain, but about a year ago, he had a fall and he sustained compression fracture of L1 and had kyphoplasty around March. He wore a lumbar brace up until a few months ago. That seemed to help with pain for a month or two, but then things started to get bad. He now reports for the past 4-6 weeks that he had entire RLE pain numbness into his foot. He also reports along that same time frame that he has many instances at night where he loses control of his bladder, and also has difficulty raising arms overhead which apparently is somewhat new.  He has been very diligent about setting an alarm to go to the restroom every 2 hours to prevent accidents during the day. The past few weeks, he reports increased episodes of bed wetting at night. He last saw spine surgeon a few weeks ago regarding bed wetting and it was recommended to try epidural and possibly surgery. He apparently was denied epidurals and was told to try PT first by his insurance. He is ambulating w/ cane, but states his gait has worsened lately and he has considered using a walker at times. After initial injury, he states compression fracture was around 10% compressed, now he states its around 40% and he was told when it reaches 50%, he has to have surgery due to pressure on spinal cord. Next f/u w/ surgeon isn't till middle of next month. He also has osteoporosis and he attributes that to malnutrition from his bypass surgery which was many years ago. He has tried taking calcium/ D supplements and also tried a Rx medicine for osteoporosis, but per his recent Dexa scans, he states his numbers didn't change much. He is going to follow up with gastro doctor next month sometime. He is wearing a TLSO and was told to wear it as much as he can. He was wearing a lumbar support after surgery, but now has this TLSO. He walks w/ a cane. At his last f/u w/ surgeon, he had just started to have severe pain and also loss of bladder control; per his report this has worsened a great deal since that time. He has tried to contact their office, but only got thru to his . *RLE pain: >75% of the day. Relieved only partially w/ pain meds. Worse in the morning; sleeps in his TLSO due to following instructions to wear it as much as possible. 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       *future: discuss diet (vegan?)  *PT recommended f/u w/ PCP re: bone density and cont'd worsening bladder dysfunction/ UE/LE strength deficits    *primary problems:  - new increased RLE pain (intermittent, but lasts for hours at a time)  - new numbness along L4/5 dermatome    *Prior function:   - walk dogs 2 miles, 2x/day   - ride 50cc scooter  - housework: 1/2 day at a time  - able to carry musician equipment  - wood working    *Current function:   - ~15min of housework,   - son needs to carry musician equipment (40lbs) (last able to do this 1+ yrs ago)     Imaging:  Impression       1. Mild nonspecific marrow edema involving the base of the T7 spinous process. No correlate is seen on CT chest dated 3/21/2020. Consider CT thoracic spine to exclude a nondisplaced fracture. 2.   Chronic T5, T8, L1 compression fractures. 3.  Thoracic spondylosis resulting in mild T7-8 and T9-10 spinal canal stenoses and mild left T6-7 and T7-8 neural foraminal narrowing. *L1 comp fx was 75% ht loss in Sept 2021  *40% in Jan 2022  *40%  in Sept 2022    Hx: CVA in 2014: L side effected; moderate deficits at that time. SUBJECTIVE:    10/27: saw PCP earlier and feels a lot better about the direction he is headed. We did review his symptoms quite a bit and his biggest concern is new RLE pain and weakness. He is used to the chronic back pain. 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 Typically of late, he has only paid for it w/ pain/ fatigue x 1 day, not 2. He states he feels extremely tired today. After getting ready/ showered, he felt like he could go back to sleep for a long while. States his RLE pain/ numbness actually hasn't been as bad the past few days. Mostly biggest problems of significant low and mid back pain. 11/3: Has been sore since doing the prone and Qped exercises prior session. Was wondering if wearing his back brace during activities may have attributed to it as he will sometimes sleep on his stomach. Patient reports that he has increased his walking at home. F/U with surgeon at the end of the month and f/u with urologist next week. 11/7: RLE pain about the same, but moving better.  He has already walked about 1/2 to 1 mile this AM walking dogs. He also reports gait stability feels better. 11/10: 15 min late - Came from appt at [facility]. Did okay after last session. A bit worn out today. Has already walked with the dogs    OBJECTIVE:   Observation:   Test measurements:      RESTRICTIONS/PRECAUTIONS: L1 kyphoplasty, T5 & T8 comp fx, chronic pain, worsening gait/ UE/LE weakness    Exercises/Interventions:     Therapeutic Ex (52245)   Min: Resistance/Reps Notes/Cues   Warm up/ cardio Treadmill 1. 2mph x 6'    Mini squats @ table x 15 Mild cues   SL clamshell    Hookly bridge Mod difficult        ST hip abd    EOB hip ext resume        Step up 8\" x10 R,L 1 rail contralat hand        scaption Seated 2x10 0-80* bilat, 2# Quite fatigued; NV try 1# bilat   Wall push up 2x10 Mod fatigue   Seated row Blue 2 x 10    Lateral walk P-bars 4 lengths Cues for shorter steps and to avoid spinal involvement        Pt edu Reviewed HEP; see below Pt deferred handout   Therapeutic Activity (29204) Min:      Pt edu Pathology, role of gradual progressive functional strength                   NMR re-education (66320)   Min:     gait Adjusted cane for proper height. Quad  Very fatigued/ painful             Manual Intervention (18304) Min:                Modalities  Min:             Other Therapeutic Activities:  Pt was educated on PT POC, Diagnosis, Prognosis, pathomechanics as well as frequency and duration of scheduling future physical therapy appointments. Time was also taken on this day to answer all patient questions and participation in PT. Reviewed appointment policy in detail with patient and patient verbalized understanding.      Home Exercise Program: Patient instructed in the following for HEP:      10/27: hookly bridge, mini squats, walking program  11/7: mini squat, wall push up, seated TB row, seated scaption (1#), walking program.       . Patient verbalized/demonstrated understanding and was issued written handout. Therapeutic Exercise and NMR EXR  [x] (88433) Provided verbal/tactile cueing for activities related to strengthening, flexibility, endurance, ROM  for improvements in proximal hip and core control with self care, mobility, lifting and ambulation. [x] (39655) Provided verbal/tactile cueing for activities related to improving balance, coordination, kinesthetic sense, posture, motor skill, proprioception  to assist with core control in self care, mobility, lifting, and ambulation. Therapeutic Activities:    [x] (14442 or 51726) Provided verbal/tactile cueing for activities related to improving balance, coordination, kinesthetic sense, posture, motor skill, proprioception and motor activation to allow for proper function  with self care and ADLs  [x] (53551) Provided training and instruction to the patient for proper core and proximal hip recruitment and positioning with ambulation re-education     Home Exercise Program:    [x] (55098) Reviewed/Progressed HEP activities related to strengthening, flexibility, endurance, ROM of core, proximal hip and LE for functional self-care, mobility, lifting and ambulation   [x] (30258) Reviewed/Progressed HEP activities related to improving balance, coordination, kinesthetic sense, posture, motor skill, proprioception of core, proximal hip and LE for self care, mobility, lifting, and ambulation      Manual Treatments:  PROM / STM / Oscillations-Mobs:  G-I, II, III, IV (PA's, Inf., Post.)  [x] (28404) Provided manual therapy to mobilize proximal hip and LS spine soft tissue/joints for the purpose of modulating pain, promoting relaxation,  increasing ROM, reducing/eliminating soft tissue swelling/inflammation/restriction, improving soft tissue extensibility and allowing for proper ROM for normal function with self care, mobility, lifting and ambulation.        Approval Dates:  CPT Code Units Approved Units Used  Date Updated:                     Charges:  Timed Code Treatment Minutes: 30   Total Treatment Minutes: 30     [] EVAL (LOW) 26048 (typically 20 minutes face-to-face)  [] EVAL (MOD) 31946 (typically 30 minutes face-to-face)  [] EVAL (HIGH) 23155 (typically 45 minutes face-to-face)  [] RE-EVAL     [x] LG(79845) x 2    [] Dry needle 1 or 2 Muscles (96632)  [] NMR (22319) x     [] Dry needle 3+ Muscles (97620)  [] Manual (36102) x     [] Ultrasound (68168) x  [] TA (75184) x     [] Mech Traction (00226)  [] ES(attended) (45004)     [] ES (un) (87685):   [] Vasopump (06857) [] Ionto (07129)   [] Other:    Laura Medina stated goal: able to stand/ walk and prepare a meal for family. [] Progressing: [] Met: [] Not Met: [] Adjusted  Therapist goals for Patient:   Short Term Goals: To be achieved in: 2 weeks  1. Independent in HEP and progression per patient tolerance, in order to prevent re-injury. [] Progressing: [] Met: [] Not Met: [] Adjusted  2. Patient will have a decrease in pain to facilitate improvement in movement, function, and ADLs as indicated by Functional Deficits. [] Progressing: [] Met: [] Not Met: [] Adjusted    Long Term Goals: To be achieved in: 6-10 weeks  1. Increase FOTO functional outcome score from 24 to 42 to assist with reaching prior level of function. [] Progressing: [] Met: [] Not Met: [] Adjusted  2. Patient will demonstrate increased AROM to WNL, good LS mobility, good hip ROM to allow for proper joint functioning as indicated by patients Functional Deficits. [] Progressing: [] Met: [] Not Met: [] Adjusted  3. Patient will demonstrate an increase in Strength of lower quarter to be able to rise from chair at least 3x in 30sec. [] Progressing: [] Met: [] Not Met: [] Adjusted  4. Patient will be able to reach arms overhead to at least 130* bilat to get closer to functional mobility. [] Progressing: [] Met: [] Not Met: [] Adjusted  5. Pt will have no episodes of loss of bladder control over a 2 week period.    [] Progressing: [] Met: []

## 2022-11-13 LAB — ANAEROBIC CULTURE: NORMAL

## 2022-11-14 ENCOUNTER — HOSPITAL ENCOUNTER (OUTPATIENT)
Dept: PHYSICAL THERAPY | Age: 52
Setting detail: THERAPIES SERIES
Discharge: HOME | End: 2022-11-14
Payer: COMMERCIAL

## 2022-11-14 PROCEDURE — 97110 THERAPEUTIC EXERCISES: CPT

## 2022-11-14 NOTE — FLOWSHEET NOTE
[facility] [facility], [facility]  [address] [facility] [place], [facility]  Phone: [phone]   Fax:     [phone]    Physical Therapy Treatment Note/ Progress Report:     Date:  2022    Patient Name:  Otoniel Sanchez    :  1970  MRN: [id number]    Pertinent Medical History:      Medical/Treatment Diagnosis Information:  Medical Diagnosis: Compression fracture of L1 lumbar vertebra ([address] [L02.684S]  Treatment Diagnosis: back pain, limited mobility, activity tolerance, gait dysfunction, bladder dysfunction    Insurance/Certification information:     Physician Information:  Nuvia Joseph MD  Plan of care signed (Y/N):     Date of Patient follow up with Physician:      Progress Report: []  Yes  [x]  No     Date Range for reporting period:  Beginning: 10/24/2022  Ending:    Progress report due (10 Rx/or 30 days whichever is less): #80     Recertification due (POC duration/ or 90 days whichever is less):     Visit # POC/ Insurance Allowable Auth Needed   7 /  Sonye Ania []Yes    []No     Functional Outcomes Measure:   Date Assessed: at eval  Test:FOTO  Score:    Pain level:  /10     History of Injury: Pt here for evaluation for c/o back and R leg pain. He reports a long hx of a chronic pain, but about a year ago, he had a fall and he sustained compression fracture of L1 and had kyphoplasty around March. He wore a lumbar brace up until a few months ago. That seemed to help with pain for a month or two, but then things started to get bad. He now reports for the past 4-6 weeks that he had entire RLE pain numbness into his foot. He also reports along that same time frame that he has many instances at night where he loses control of his bladder, and also has difficulty raising arms overhead which apparently is somewhat new.  He has been very diligent about setting an alarm to go to the restroom every 2 hours to prevent accidents during the day. The past few weeks, he reports increased episodes of bed wetting at night. He last saw spine surgeon a few weeks ago regarding bed wetting and it was recommended to try epidural and possibly surgery. He apparently was denied epidurals and was told to try PT first by his insurance. He is ambulating w/ cane, but states his gait has worsened lately and he has considered using a walker at times. After initial injury, he states compression fracture was around 10% compressed, now he states its around 40% and he was told when it reaches 50%, he has to have surgery due to pressure on spinal cord. Next f/u w/ surgeon isn't till middle of next month. He also has osteoporosis and he attributes that to malnutrition from his bypass surgery which was many years ago. He has tried taking calcium/ D supplements and also tried a Rx medicine for osteoporosis, but per his recent Dexa scans, he states his numbers didn't change much. He is going to follow up with gastro doctor next month sometime. He is wearing a TLSO and was told to wear it as much as he can. He was wearing a lumbar support after surgery, but now has this TLSO. He walks w/ a cane. At his last f/u w/ surgeon, he had just started to have severe pain and also loss of bladder control; per his report this has worsened a great deal since that time. He has tried to contact their office, but only got thru to his . *RLE pain: >75% of the day. Relieved only partially w/ pain meds. Worse in the morning; sleeps in his TLSO due to following instructions to wear it as much as possible. 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       *future: discuss diet (vegan?)  *PT recommended f/u w/ PCP re: bone density and cont'd worsening bladder dysfunction/ UE/LE strength deficits    *primary problems:  - new increased RLE pain (intermittent, but lasts for hours at a time)  - new numbness along L4/5 dermatome    *Prior function:   - walk dogs 2 miles, 2x/day   - ride 50cc scooter  - housework: 1/2 day at a time  - able to carry musician equipment  - wood working    *Current function:   - ~15min of housework,   - son needs to carry musician equipment (40lbs) (last able to do this 1+ yrs ago)     Imaging:  Impression       1. Mild nonspecific marrow edema involving the base of the T7 spinous process. No correlate is seen on CT chest dated 3/21/2020. Consider CT thoracic spine to exclude a nondisplaced fracture. 2.   Chronic T5, T8, L1 compression fractures. 3.  Thoracic spondylosis resulting in mild T7-8 and T9-10 spinal canal stenoses and mild left T6-7 and T7-8 neural foraminal narrowing. *L1 comp fx was 75% ht loss in Sept 2021  *40% in Jan 2022  *40%  in Sept 2022    Hx: CVA in 2014: L side effected; moderate deficits at that time. SUBJECTIVE:    10/27: saw PCP earlier and feels a lot better about the direction he is headed. We did review his symptoms quite a bit and his biggest concern is new RLE pain and weakness. He is used to the chronic back pain. 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 Typically of late, he has only paid for it w/ pain/ fatigue x 1 day, not 2. He states he feels extremely tired today. After getting ready/ showered, he felt like he could go back to sleep for a long while. States his RLE pain/ numbness actually hasn't been as bad the past few days. Mostly biggest problems of significant low and mid back pain. 11/3: Has been sore since doing the prone and Qped exercises prior session. Was wondering if wearing his back brace during activities may have attributed to it as he will sometimes sleep on his stomach. Patient reports that he has increased his walking at home. F/U with surgeon at the end of the month and f/u with urologist next week. 11/7: RLE pain about the same, but moving better.  He has already walked about 1/2 to 1 mile this AM walking dogs. He also reports gait stability feels better. 11/10: 15 min late - Came from appt at [facility]. Did okay after last session. A bit worn out today. Has already walked with the dogs  11/14: pt states numbness isn't as bad, weakness in RLE still present, mostly at end of the day. Started new med from urologist, and having less accidents at night also. No change in amount of back pain, but he notices he is more aware of posture and spends less time in slouched posture. OBJECTIVE:   Observation:   Test measurements:      RESTRICTIONS/PRECAUTIONS: L1 kyphoplasty, T5 & T8 comp fx, chronic pain, worsening gait/ UE/LE weakness    Exercises/Interventions:     Therapeutic Ex (43838)   Min: Resistance/Reps Notes/Cues   Warm up/ cardio Resume prn   squats Slightly elevated table 2x8 ~20.5\" high   SL clamshell    Catia worley Mod difficult        ST hip abd    EOB hip ext resume        Step up 8\" 2x10 R,L W/ balance point, 1 hand assist        scaption ST 2x10 bilat, 2# 0-120*+ per christiano   Wall push up 2x15    Seated row [employer] sets per christiano   Lateral walk outs Yellow TB 2x3 laps ea         Pt edu     Therapeutic Activity (53742) Min:      Pt edu Reviewed PT focus for whole body strength/ progressive activity tolerance                   NMR re-education (34572)   Min:     gait Adjusted cane for proper height. Quad  Very fatigued/ painful             Manual Intervention (02019) Min:                Modalities  Min:             Other Therapeutic Activities:  Pt was educated on PT POC, Diagnosis, Prognosis, pathomechanics as well as frequency and duration of scheduling future physical therapy appointments. Time was also taken on this day to answer all patient questions and participation in PT. Reviewed appointment policy in detail with patient and patient verbalized understanding.      Home Exercise Program: Patient instructed in the following for HEP:      10/27: catia worley mini squats, walking program  11/7: mini squat, wall push up, seated TB row, seated scaption (1#), walking program.       . Patient verbalized/demonstrated understanding and was issued written handout. Therapeutic Exercise and NMR EXR  [x] (19976) Provided verbal/tactile cueing for activities related to strengthening, flexibility, endurance, ROM  for improvements in proximal hip and core control with self care, mobility, lifting and ambulation. [x] (01531) Provided verbal/tactile cueing for activities related to improving balance, coordination, kinesthetic sense, posture, motor skill, proprioception  to assist with core control in self care, mobility, lifting, and ambulation.      Therapeutic Activities:    [x] (53065 or 12346) Provided verbal/tactile cueing for activities related to improving balance, coordination, kinesthetic sense, posture, motor skill, proprioception and motor activation to allow for proper function  with self care and ADLs  [x] (84140) Provided training and instruction to the patient for proper core and proximal hip recruitment and positioning with ambulation re-education     Home Exercise Program:    [x] (82546) Reviewed/Progressed HEP activities related to strengthening, flexibility, endurance, ROM of core, proximal hip and LE for functional self-care, mobility, lifting and ambulation   [x] (41045) Reviewed/Progressed HEP activities related to improving balance, coordination, kinesthetic sense, posture, motor skill, proprioception of core, proximal hip and LE for self care, mobility, lifting, and ambulation      Manual Treatments:  PROM / STM / Oscillations-Mobs:  G-I, II, III, IV (PA's, Inf., Post.)  [x] (09370) Provided manual therapy to mobilize proximal hip and LS spine soft tissue/joints for the purpose of modulating pain, promoting relaxation,  increasing ROM, reducing/eliminating soft tissue swelling/inflammation/restriction, improving soft tissue extensibility and allowing for proper ROM for normal function with self care, mobility, lifting and ambulation. Approval Dates:  CPT Code Units Approved Units Used  Date Updated:                     Charges:  Timed Code Treatment Minutes: 38   Total Treatment Minutes: 38     [] EVAL (LOW) 75171 (typically 20 minutes face-to-face)  [] EVAL (MOD) 64176 (typically 30 minutes face-to-face)  [] EVAL (HIGH) 95356 (typically 45 minutes face-to-face)  [] RE-EVAL     [x] OV(25714) x 3    [] Dry needle 1 or 2 Muscles (13380)  [] NMR (07838) x     [] Dry needle 3+ Muscles (81851)  [] Manual (52269) x     [] Ultrasound (73239) x  [] TA (81717) x     [] Mech Traction (79359)  [] ES(attended) (75982)     [] ES (un) (12679):   [] Vasopump (29126) [] Ionto (50965)   [] Other:    Lyndon Poe stated goal: able to stand/ walk and prepare a meal for family. [] Progressing: [] Met: [] Not Met: [] Adjusted  Therapist goals for Patient:   Short Term Goals: To be achieved in: 2 weeks  1. Independent in HEP and progression per patient tolerance, in order to prevent re-injury. [] Progressing: [] Met: [] Not Met: [] Adjusted  2. Patient will have a decrease in pain to facilitate improvement in movement, function, and ADLs as indicated by Functional Deficits. [] Progressing: [] Met: [] Not Met: [] Adjusted    Long Term Goals: To be achieved in: 6-10 weeks  1. Increase FOTO functional outcome score from 24 to 42 to assist with reaching prior level of function. [] Progressing: [] Met: [] Not Met: [] Adjusted  2. Patient will demonstrate increased AROM to WNL, good LS mobility, good hip ROM to allow for proper joint functioning as indicated by patients Functional Deficits. [] Progressing: [] Met: [] Not Met: [] Adjusted  3. Patient will demonstrate an increase in Strength of lower quarter to be able to rise from chair at least 3x in 30sec. [] Progressing: [] Met: [] Not Met: [] Adjusted  4.  Patient will be able to reach arms overhead to at least 130* bilat to get closer to functional mobility. [] Progressing: [] Met: [] Not Met: [] Adjusted  5. Pt will have no episodes of loss of bladder control over a 2 week period. [] Progressing: [] Met: [] Not Met: [] Adjusted     ASSESSMENT:  Pt needs skilled PT to restore gross upper/ lower quarter functional strength and promote increased activity tolerance. Pt cont's to tolerate PT fairly well. We are building on each session with complexity and advancement of his program. He does report moderate fatigue at end, but no signficant increase in pain reported. Treatment/Activity Tolerance:  [] Patient tolerated treatment well [x] Patient limited by fatique  [x] Patient limited by pain  [] Patient limited by other medical complications  [] Other:     Overall Progression Towards Functional goals/ Treatment Progress Update:  [] Patient is progressing as expected towards functional goals listed. [] Progression is slowed due to complexities/Impairments listed. [] Progression has been slowed due to co-morbidities. [x] Plan just implemented, too soon to assess goals progression <30days   [] Goals require adjustment due to lack of progress  [] Patient is not progressing as expected and requires additional follow up with physician  [] Other:    Prognosis for POC: [x] Good [] Fair  [] Poor    Patient requires continued skilled intervention: [x] Yes  [] No        PLAN: See eval  [x] Continue per plan of care [] Alter current plan (see comments)  [] Plan of care initiated [] Hold pending MD visit [] Discharge    Electronically signed by: Jalyn Rabago, PT , DPT  #[id number]          Note: If patient does not return for scheduled/recommended follow up visits, this note will serve as a discharge from care along with the most recent update on progress.

## 2022-11-15 ENCOUNTER — TELEPHONE (OUTPATIENT)
Dept: ENDOCRINOLOGY | Age: 52
End: 2022-11-15

## 2022-11-15 RX ORDER — OXYCODONE HYDROCHLORIDE 5 MG/1
5 TABLET ORAL EVERY 6 HOURS PRN
Qty: 120 TABLET | Refills: 0 | Status: SHIPPED | OUTPATIENT
Start: 2022-11-15 | End: 2022-12-15

## 2022-11-15 NOTE — TELEPHONE ENCOUNTER
From: Valeriy Rodriguez  Sent: 11/15/2022 11:59 AM EST  To: [facility]  Subject: Recent prescription    Hi, I am now out of the extended release oxycodone that Dr. Honorio Mueller prescribed. The pharmacy still hasnt gotten in the pills to finish filling that prescription and as we discussed earlier I would like to change back to the immediate release tablets. I spoke with Dr. Honorio Mueller about it when I saw him Thursday and he agreed and said he put something in my chart to indicate the prescription should be changed to the IR tablets when it was time to refill. Could you please send a prescription for the immediate release oxycodone tablets to [facility]? Their number is [phone].      Thank you,  [facility]

## 2022-11-16 ENCOUNTER — OFFICE VISIT (OUTPATIENT)
Dept: SURGERY | Age: 52
End: 2022-11-16
Payer: COMMERCIAL

## 2022-11-16 VITALS — WEIGHT: 198 LBS | BODY MASS INDEX: 26.85 KG/M2

## 2022-11-16 DIAGNOSIS — K43.2 RECURRENT INCISIONAL HERNIA: Primary | ICD-10-CM

## 2022-11-16 DIAGNOSIS — G45.9 TIA (TRANSIENT ISCHEMIC ATTACK): ICD-10-CM

## 2022-11-16 DIAGNOSIS — G89.4 PAIN SYNDROME, CHRONIC: ICD-10-CM

## 2022-11-16 DIAGNOSIS — Z72.0 TOBACCO USE: ICD-10-CM

## 2022-11-16 PROCEDURE — 99214 OFFICE O/P EST MOD 30 MIN: CPT | Performed by: SURGERY

## 2022-11-16 ASSESSMENT — ENCOUNTER SYMPTOMS: ABDOMINAL PAIN: 1

## 2022-11-16 NOTE — LETTER
Surgery Scheduling Form:      DEMOGRAPHICS:                                                                                                         .    Patient Name:  Valentine Sher  Patient :  1970   Patient SS#:      Patient Phone:  [phone] (home)  Alt. Patient Phone:                     Patient Address:  [address]  [address] [id number]    PCP:  Cheryl Del Cid MD  Insurance:  Payor: Jonathan Bradford / [facility] / Product Type: *No Product type* /   Insurance ID Number:    Payer/Plan Subscr  Sex Relation Sub. Ins. ID Effective Group Num   1. [address] -* [address] [place] [address] 3/14/1974 Female Spouse [id number]                                    [id number]         DIAGNOSIS & PROCEDURE:                                                                                       .    Diagnosis:   K43.2 Recurrent incisional hernia  Operation:  Recurrent incisional hernia repair with mesh  Location: [facility]   Surgeon:  Darline Lang MD          [facility] INFORMATION:                                                                                    .    Surgeon's Scheduling Instruction:  elective  Requested Date: 22   OR Time: 1040  Patient Arrival Time: 925  OR Time Required:  45  Minutes  Anesthesia:  MAC/TIVA  Equipment:                                                          SA Required:  Yes     Status:  Outpatient            Standard C-Arm:  No  PAT Required:   Yes                            Best Time to Call:  AM  Patient Requested to see PCP for Pre-op H & P:  No  Special Comments:          CPT: 11022                    Darline Lang MD  9:43am     48   PRE-CERTIFICATION INFORMATION:                                                                           .    Procedure:       CPT Code Modifier

## 2022-11-16 NOTE — TELEPHONE ENCOUNTER
Patient is scheduled for a NP appointment with Dr. Humphrey Rojas on 11/23/22 @ 9:00 am.      Patient has been advised to bring any vitamins and or supplements to the appointment.

## 2022-11-16 NOTE — LETTER
Surgeon: Fidelia Vinson MD     Your surgery will be at [facility] [place]  First floor of the [address] [place] [address] [facility] [address]    Date:12/2/2022    Arrival time:9:25am    Surgery time:10:40am     (   ) Outpatient with general anesthesia     ( x  ) Outpatient with IV sedation     (    ) Local anethesia    You will need to have a  drive you home due to anesthesia. Nothing to eat or drink after midnight the night before. FASTING SURGERY  No driving for 1 week after surgery. Patient is to remain off work for 2 weeks after surgery. Patient may return to work LIGHT DUTY (10lbs weight restriction) after 2 weeks. If LIGHT DUTY is not allowed, patient must remain off work for the full 6 weeks. You may be asked to stop blood thinners 5 days prior to surgery such as Coumadin, Plavix, Elisa Gabriel, Eliquis,and over the counter fish oil (Omega 3)     I will call you if there are any changes to your procedure     Please be sure to bring your I.D. and insurance card, you will check in with surgery on the first floor. Please contact [facility] if you need to reschedule your surgery or have any questions.   Office phone number:     [phone]  Fax number for [facility]:    [phone]

## 2022-11-16 NOTE — PROGRESS NOTES
Subjective:      Patient ID: Ron Jimenez is a 47 y.o. male. HPI  Chief Complaint: hernia  Patient referred by Dr. Toshia Dai for evaluation of hernia. Patient reports symptoms of pain, bulge. Location of symptoms is periumbilical just superior and right. Symptoms were first noted last few weeks. Alleviated by nothing. Symptoms aggravated by palpation/pressure to area. Previous evaluation includes CT at [facility] with fat containing ventral hernia. Patient has a history of multiple surgeries, including ventral hernias. Smokes 1/2 ppd. On plavix for history TIA. Will plan following treatment: repair recurrent incisional hernia 12/2.         Past Medical History:   Diagnosis Date    Alcoholism ([facility] [address]     last drink 2015    Allergic migraine with status migrainosus     Allergic rhinitis, seasonal     Anemia     Arthritis     right knee    Vaughn's esophagus     Benign intracranial hypertension     Cancer (HCC)     renal     Carpal tunnel syndrome     Chronic pain     Depression     Ear infection 05/06/2022    GERD (gastroesophageal reflux disease)     Headache(784.0)     History of blood transfusion     History of spinal fracture 2021    L1,L7,L8 fall T5,T8  - unknown 2022    History of tobacco use     Quit 7/2014    Left wrist fracture 2020    fall    Migraine     chronic for 1 year    Morbid obesity ([facility] [address]     Movement disorder     Onychomycosis     Sleep apnea, obstructive     Severe uses cpap stop bang 6    Stroke [facility]) 2014    Unspecified cerebral artery occlusion with cerebral infarction 2014    slight weakness in left arm    Wears dentures     full set    Wears glasses        Past Surgical History:   Procedure Laterality Date    ABDOMEN SURGERY      gastric bypass    ABDOMEN SURGERY  4-7-2016    repair of recurrent incisional hernia with mesh, removal of old mesh, bilateral component separation    ABDOMINAL EXPLORATION SURGERY  1/11/16    exp lap, lysis of adhesions, small bowel resection    CARPAL TUNNEL RELEASE      bilat    COLONOSCOPY N/A 5/31/2022    COLONOSCOPY POLYPECTOMY SNARE/COLD BIOPSY performed by Dc Malagon MD at [facility], ESOPHAGUS      ENDOSCOPY, COLON, DIAGNOSTIC      EYE SURGERY      GASTRIC BYPASS SURGERY  Jan 2009    Has lost about 200 pounds. HERNIA REPAIR  3-    ventral    JOINT REPLACEMENT      KIDNEY REMOVAL Left 08/01/2018    KNEE ARTHROSCOPY Right 7/11/2013    Dr.Robert WaltersAdelina     KNEE ARTHROSCOPY Right 7/11/12    RIGHT KNEE ARTHROSCOPY WITH CHONDROPLASTY    KNEE SURGERY  July 2012    right, arthroscopy    KNEE SURGERY Right 2/18/2020    INCISION AND DRAINAGE RIGHT KNEE AND WOULD VAC PLACEMENT performed by Germania Sánchez MD at [place] [address] Right 2/26/2021    INCISION AND DRAINAGE OF RIGHT KNEE HEMATOMA performed by Mary Rodriguez MD at [place] [address] Right 3/5/2021    INCISION AND DRAINAGE AND CLOSURE RIGHT TOTAL KNEE performed by Mary Rodriguez MD at [address] [place] [address]  2005    OTHER SURGICAL HISTORY Left 03/08/2016    CT biopsy ablasion left kidney    OTHER SURGICAL HISTORY  2016    small intestine 12 inch removed, 2 hernia surgeries, another surgery to drain infection from incision.      REVISION TOTAL KNEE ARTHROPLASTY Right 12/17/2019    RIGHT REVISION TIBIA TOTAL KNEE REPLACEMENT performed by Germania Sánchez MD at [place] [address] Right 1/22/2020    RIGHT KNEE IRRIGATION AND DEBRIDEMENT WITH POLY EXCHANGE performed by Gabino Foreman MD at [place] [address] Right 2/16/2021    RIGHT REMOVAL OF EXPLANT AND TOTAL KNEE REPLACEMENT performed by Mary Rodriguez MD at [address] [place] [address]  10/15/13    RIGHT    TOTAL KNEE ARTHROPLASTY Right 8/12/2020    RIGHT ARTHROPLASY  RESECTION WITH INSERTION OF SPACER performed by Germania Sánchez MD at [address] [place] [address]  6-7-2016    UPPER GASTROINTESTINAL ENDOSCOPY 04/02/2018    UPPER GASTROINTESTINAL ENDOSCOPY N/A 3/24/2022    ESOPHAGOGASTRODUODENOSCOPY performed by Richard Gamboa MD at [address] [place] [address]  5/31/2022    ESOPHAGOGASTRODUODENOSCOPY WITH BIOPSY performed by Jazmine Pan MD at [facility]       Current Outpatient Medications   Medication Sig Dispense Refill    oxyCODONE (ROXICODONE) 5 MG immediate release tablet Take 1 tablet by mouth every 6 hours as needed for Pain for up to 30 days. 120 tablet 0    lipase-protease-amylase (CREON) 91096-88842 units delayed release capsule Take 1 capsule by mouth 3 times daily (with meals) 90 capsule 1    cyanocobalamin 1000 MCG/ML injection Inject into the muscle      atorvastatin (LIPITOR) 40 MG tablet TAKE 1 TABLET BY MOUTH EVERYDAY AT BEDTIME 90 tablet 1    pantoprazole (PROTONIX) 40 MG tablet TAKE 1 TABLET BY MOUTH TWICE A  tablet 1    traZODone (DESYREL) 100 MG tablet Take 2 tablets by mouth nightly 180 tablet 1    FLUoxetine (PROZAC) 20 MG capsule Take 1 capsule by mouth daily 90 capsule 1    clopidogrel (PLAVIX) 75 MG tablet TAKE 1 TABLET BY MOUTH EVERY DAY 90 tablet 2    ondansetron (ZOFRAN ODT) 4 MG disintegrating tablet Take 1 tablet by mouth every 8 hours as needed for Nausea 20 tablet 0    gabapentin (NEURONTIN) 600 MG tablet Take 600 mg by mouth 4 times daily. sildenafil (REVATIO) 20 MG tablet Take 4 tablets by mouth as needed (30 min prior to intercourse) 30 tablet 0    calcium-vitamin D (OSCAL-500) 500-200 MG-UNIT per tablet Take 1 tablet by mouth 2 times daily       Multiple Vitamin TABS Take 1 tablet by mouth daily        No current facility-administered medications for this visit. Prior to Admission medications    Medication Sig Start Date End Date Taking? Authorizing Provider   oxyCODONE (ROXICODONE) 5 MG immediate release tablet Take 1 tablet by mouth every 6 hours as needed for Pain for up to 30 days.  11/15/22 12/15/22 Yes Brady Landrum MD   lipase-protease-amylase (CREON) 85243-56995 units delayed release capsule Take 1 capsule by mouth 3 times daily (with meals) 11/10/22 12/10/22 Yes Ervin Wu MD   cyanocobalamin 1000 MCG/ML injection Inject into the muscle 4/8/21  Yes Historical Provider, MD   atorvastatin (LIPITOR) 40 MG tablet TAKE 1 TABLET BY MOUTH EVERYDAY AT BEDTIME 9/21/22  Yes Ervin Wu MD   pantoprazole (PROTONIX) 40 MG tablet TAKE 1 TABLET BY MOUTH TWICE A DAY 9/21/22  Yes Ervin Wu MD   traZODone (DESYREL) 100 MG tablet Take 2 tablets by mouth nightly 6/29/22  Yes Ervin Wu MD   FLUoxetine (PROZAC) 20 MG capsule Take 1 capsule by mouth daily 6/29/22  Yes Ervin Wu MD   clopidogrel (PLAVIX) 75 MG tablet TAKE 1 TABLET BY MOUTH EVERY DAY 4/13/22  Yes Tia Moon MD   ondansetron (ZOFRAN ODT) 4 MG disintegrating tablet Take 1 tablet by mouth every 8 hours as needed for Nausea 3/7/22  Yes Kulwinder Dodge MD   gabapentin (NEURONTIN) 600 MG tablet Take 600 mg by mouth 4 times daily. 8/2/21  Yes Historical Provider, MD   sildenafil (REVATIO) 20 MG tablet Take 4 tablets by mouth as needed (30 min prior to intercourse) 1/11/21  Yes Ervin Wu MD   calcium-vitamin D (Harrel Gonzalo) 500-200 MG-UNIT per tablet Take 1 tablet by mouth 2 times daily    Yes Historical Provider, MD   Multiple Vitamin TABS Take 1 tablet by mouth daily    Yes Historical Provider, MD         Allergies   Allergen Reactions    Nsaids Nausea Only and Other (See Comments)     Hx of Barretts esophagus      Morphine Hives and Itching     Pt gets Hives/itching.  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 Tobacco Use    Smoking status: Every Day     Packs/day: 0.50     Years: 25.00     Pack years: 12.50     Types: Cigarettes     Start date: 1985     Last attempt to quit: 2021     Years since quittin.5    Smokeless tobacco: Never    Tobacco comments:     Patient vapes   Vaping Use    Vaping Use: Former    Substances: Nicotine, THC    Devices: Refillable tank, Pre-filled pod   Substance and Sexual Activity    Alcohol use: No     Alcohol/week: 0.0 standard drinks     Comment: last drink     Drug use: Yes     Types: Marijuana Garrel Calender)     Comment: medical Decalog card    Sexual activity: Yes     Partners: Female     Comment: wife Jose Retana   Other Topics Concern    Not on file   Social History Narrative    Not on file     Social Determinants of Health     Financial Resource Strain: Not on file   Food Insecurity: Not on file   Transportation Needs: Not on file   Physical Activity: Not on file   Stress: Not on file   Social Connections: Not on file   Intimate Partner Violence: Not on file   Housing Stability: Not on file       Family History   Problem Relation Age of Onset    Arthritis Mother     Osteoporosis Mother     Cancer Father         Lymphoma    Heart Disease Maternal Uncle     Heart Disease Maternal Uncle     Diabetes Maternal Uncle        Review of Systems   Constitutional: Negative. Gastrointestinal:  Positive for abdominal pain. Hematological:  Bruises/bleeds easily. All other systems reviewed and are negative. Objective:   Physical Exam  Vitals reviewed. Constitutional:       General: He is not in acute distress. Appearance: Normal appearance. He is well-developed. He is not diaphoretic. HENT:      Head: Normocephalic and atraumatic.       Right Ear: External ear normal.      Left Ear: External ear normal.   Eyes:      Conjunctiva/sclera: Conjunctivae normal.      Pupils: Pupils are equal, round, and reactive to light. Neck:      Trachea: Trachea normal.   Cardiovascular:      Heart sounds: S1 normal and S2 normal.   Pulmonary:      Effort: Pulmonary effort is normal. No respiratory distress. Abdominal:      General: There is no distension. Palpations: Abdomen is soft. Tenderness: There is abdominal tenderness. Hernia: A hernia (incarcerated fat right and superior to umbilicus) is present. Musculoskeletal:         General: Normal range of motion. Cervical back: Normal range of motion and neck supple. Skin:     General: Skin is warm and dry. Findings: No erythema. Neurological:      Mental Status: He is alert and oriented to person, place, and time. Psychiatric:         Behavior: Behavior normal. Behavior is cooperative. Thought Content: Thought content normal.         Judgment: Judgment normal.       Assessment:       Diagnosis Orders   1. Recurrent incisional hernia        2. Pain syndrome, chronic        3. TIA (transient ischemic attack) LUE weakness 3/21        4. Tobacco use                Plan:      Plan recurrent incisional hernia repair 12/2  The risks, benefits and alternatives to the planned procedure were discussed. Patient expressed an understanding and is willing to proceed. Spoke to increased risks of recurrence, wound complications, anesthesia due to tobacco  use and history TIA.   Will need to hold plavix 5 days prior        Tameka Correa MD

## 2022-11-17 ENCOUNTER — HOSPITAL ENCOUNTER (OUTPATIENT)
Dept: PHYSICAL THERAPY | Age: 52
Setting detail: THERAPIES SERIES
Discharge: HOME | End: 2022-11-17
Payer: COMMERCIAL

## 2022-11-17 DIAGNOSIS — K85.00 IDIOPATHIC ACUTE PANCREATITIS WITHOUT INFECTION OR NECROSIS: ICD-10-CM

## 2022-11-17 LAB — LIPASE: 13 U/L (ref 13–60)

## 2022-11-17 PROCEDURE — 97110 THERAPEUTIC EXERCISES: CPT

## 2022-11-17 PROCEDURE — 97140 MANUAL THERAPY 1/> REGIONS: CPT

## 2022-11-17 PROCEDURE — 97530 THERAPEUTIC ACTIVITIES: CPT

## 2022-11-17 NOTE — PROGRESS NOTES
[facility], [facility]  [address] [facility] [facility] [place], [facility]  Phone: [phone]   Fax:     [phone]    Physical Therapy Treatment Note/ Progress Report:     Date:  2022    Patient Name:  Valeriy Rodriguez    :  1970  MRN: [id number]    Pertinent Medical History:      Medical/Treatment Diagnosis Information:  Medical Diagnosis: Compression fracture of L1 lumbar vertebra ([address] [O58.001G]  Treatment Diagnosis: back pain, limited mobility, activity tolerance, gait dysfunction, bladder dysfunction    Insurance/Certification information:     Physician Information:  Hinda Dandy, MD  Plan of care signed (Y/N):     Date of Patient follow up with Physician:      Progress Report: []  Yes  [x]  No     Date Range for reporting period:  Beginning: , 22  Ending:    Progress report due (10 Rx/or 30 days whichever is less): #, #30    Recertification due (POC duration/ or 90 days whichever is less):     Visit # POC/ Insurance Allowable Auth Needed     Sabrina Bhatti []Yes    []No     Functional Outcomes Measure:   Date Assessed: at eval  Test:FOTO (24-41)  Score:    : 44pts    Pain level:  /10     History of Injury: Pt here for evaluation for c/o back and R leg pain. He reports a long hx of a chronic pain, but about a year ago, he had a fall and he sustained compression fracture of L1 and had kyphoplasty around March. He wore a lumbar brace up until a few months ago. That seemed to help with pain for a month or two, but then things started to get bad. He now reports for the past 4-6 weeks that he had entire RLE pain numbness into his foot. He also reports along that same time frame that he has many instances at night where he loses control of his bladder, and also has difficulty raising arms overhead which apparently is somewhat new.  He has been very diligent about setting an alarm to go to the restroom every 2 hours to prevent accidents during the day. The past few weeks, he reports increased episodes of bed wetting at night. He last saw spine surgeon a few weeks ago regarding bed wetting and it was recommended to try epidural and possibly surgery. He apparently was denied epidurals and was told to try PT first by his insurance. He is ambulating w/ cane, but states his gait has worsened lately and he has considered using a walker at times. After initial injury, he states compression fracture was around 10% compressed, now he states its around 40% and he was told when it reaches 50%, he has to have surgery due to pressure on spinal cord. Next f/u w/ surgeon isn't till middle of next month. He also has osteoporosis and he attributes that to malnutrition from his bypass surgery which was many years ago. He has tried taking calcium/ D supplements and also tried a Rx medicine for osteoporosis, but per his recent Dexa scans, he states his numbers didn't change much. He is going to follow up with gastro doctor next month sometime. He is wearing a TLSO and was told to wear it as much as he can. He was wearing a lumbar support after surgery, but now has this TLSO. He walks w/ a cane. At his last f/u w/ surgeon, he had just started to have severe pain and also loss of bladder control; per his report this has worsened a great deal since that time. He has tried to contact their office, but only got thru to his . *RLE pain: >75% of the day. Relieved only partially w/ pain meds. Worse in the morning; sleeps in his TLSO due to following instructions to wear it as much as possible. 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       *future: discuss diet (vegan?)  *PT recommended f/u w/ PCP re: bone density and cont'd worsening bladder dysfunction/ UE/LE strength deficits    *primary problems:  - new increased RLE pain (intermittent, but lasts for hours at a time)  - new numbness along L4/5 dermatome    *Prior function:   - walk dogs 2 miles, 2x/day   - ride 50cc scooter  - housework: 1/2 day at a time  - able to carry musician equipment  - wood working    *Current function:   - ~15min of housework,   - son needs to carry musician equipment (40lbs) (last able to do this 1+ yrs ago)     Imaging:  Impression       1. Mild nonspecific marrow edema involving the base of the T7 spinous process. No correlate is seen on CT chest dated 3/21/2020. Consider CT thoracic spine to exclude a nondisplaced fracture. 2.   Chronic T5, T8, L1 compression fractures. 3.  Thoracic spondylosis resulting in mild T7-8 and T9-10 spinal canal stenoses and mild left T6-7 and T7-8 neural foraminal narrowing. *L1 comp fx was 75% ht loss in Sept 2021  *40% in Jan 2022  *40%  in Sept 2022    Hx: CVA in 2014: L side effected; moderate deficits at that time. SUBJECTIVE:    10/27: saw PCP earlier and feels a lot better about the direction he is headed. We did review his symptoms quite a bit and his biggest concern is new RLE pain and weakness. He is used to the chronic back pain. 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 Typically of late, he has only paid for it w/ pain/ fatigue x 1 day, not 2. He states he feels extremely tired today. After getting ready/ showered, he felt like he could go back to sleep for a long while. States his RLE pain/ numbness actually hasn't been as bad the past few days. Mostly biggest problems of significant low and mid back pain. 11/3: Has been sore since doing the prone and Qped exercises prior session. Was wondering if wearing his back brace during activities may have attributed to it as he will sometimes sleep on his stomach. Patient reports that he has increased his walking at home. F/U with surgeon at the end of the month and f/u with urologist next week. 11/7: RLE pain about the same, but moving better. He has already walked about 1/2 to 1 mile this AM walking dogs. He also reports gait stability feels better. 11/10: 15 min late - Came from app[facility] at [facility]. Did okay after last session. A bit worn out today. Has already walked with the dogs  11/14: pt states numbness isn't as bad, weakness in RLE still present, mostly at end of the day. Started new med from urologist, and having less accidents at night also. No change in amount of back pain, but he notices he is more aware of posture and spends less time in slouched posture. 11/17: pt reports overall, not a great deal of change with back or RLE pain/ numbness/ weakness. He does feel generally stronger and able to sit up straighter and walk a little further. . Periodic numbness about 1/2 the day, RLE pain is nearly constant at 6/10. F/u w/ spine surgeon next week. He recently found out that he will need another stomach surgery due to hernia in early Dec.     OBJECTIVE:   Observation:   Test measurements:    11/17: lumbar flex: WFL, RLE radic sx reproduced w/ R lateral flex. Standing posture reveals slight lumbar curvature with concavity to R. Possible leg length difference approx 1/4 to 1/2\" with R longer, but trial of heel lift did not help and made him feel more uneven.   ROM  11/17                                                      Strength         Hip flex         Knee ext  R=4/5  L=5/5       Knee flex  R=4/5  L=5/5       Ankle DF/ Ev  R=4+/5  L=5/5                         TESTS/ OTHER         SLR  R: pos @ 20*       Crossed SLR  L pos @ 45* w/ R sided LBP                                      RESTRICTIONS/PRECAUTIONS: L1 kyphoplasty, T5 & T8 comp fx, chronic pain, worsening gait/ UE/LE weakness    Exercises/Interventions:     Therapeutic Ex (18103)   Min: Resistance/Reps Notes/Cues   Warm up/ cardio Resume prn   squats  Resume  ~20.5\" high   SL clamshell 2x10 R Lay over towel roll to gap R lumbar   Hookly bridge Mod difficult        ST hip abd    EOB hip ext resume        Step up 8\" 2x10 R,L W/ balance point, 1 hand assist   Positional gapping L sidelying w/ towel roll under hip to gap R lumbar spine:   3' x 2 rounds    scaption Resume  0-120*+ per christiano   Wall push up Resume   Seated row Resume  Incr sets per christiano   Lateral walk outs Resume        Pt edu Reviewed/ updated HEP; emphasis on centralization; positional relief, and cont'd functional strength/ activity tolerance work. Therapeutic Activity (97786) Min:      Pt edu Reassessment of mobility and reproduction of pain, pt edu for new updated HEP    Standing directional preference stretch Light stretch into L side bend: 5s, 2x4 Mild to mod centralization of radic sx. NMR re-education (66515)   Min:     gait Adjusted cane for proper height. Quad  Resume  Very fatigued/ painful             Manual Intervention (55737) Min:      Lumbar manual Low grade LAD: brief trial R, Bilat: but slightly painful    Sidely: low grade gapping over towel: open R lumbar         Modalities  Min:             Other Therapeutic Activities:  Pt was educated on PT POC, Diagnosis, Prognosis, pathomechanics as well as frequency and duration of scheduling future physical therapy appointments. Time was also taken on this day to answer all patient questions and participation in PT. Reviewed appointment policy in detail with patient and patient verbalized understanding. Home Exercise Program: Patient instructed in the following for HEP:      10/27: hookly bridge, mini squats, walking program  11/7: mini squat, wall push up, seated TB row, seated scaption (1#), walking program.  11/17: add sidely lumbar gapping over towel roll: 3-5min, 3-5x/day as needed to centralize sx. . Patient verbalized/demonstrated understanding and was issued written handout.       Therapeutic Exercise and NMR EXR  [x] (66956) Provided verbal/tactile cueing for activities related to strengthening, flexibility, endurance, ROM  for improvements in proximal hip and core control with self care, mobility, lifting and ambulation. [x] (02504) Provided verbal/tactile cueing for activities related to improving balance, coordination, kinesthetic sense, posture, motor skill, proprioception  to assist with core control in self care, mobility, lifting, and ambulation. Therapeutic Activities:    [x] (89722 or 08652) Provided verbal/tactile cueing for activities related to improving balance, coordination, kinesthetic sense, posture, motor skill, proprioception and motor activation to allow for proper function  with self care and ADLs  [x] (16387) Provided training and instruction to the patient for proper core and proximal hip recruitment and positioning with ambulation re-education     Home Exercise Program:    [x] (76368) Reviewed/Progressed HEP activities related to strengthening, flexibility, endurance, ROM of core, proximal hip and LE for functional self-care, mobility, lifting and ambulation   [x] (88639) Reviewed/Progressed HEP activities related to improving balance, coordination, kinesthetic sense, posture, motor skill, proprioception of core, proximal hip and LE for self care, mobility, lifting, and ambulation      Manual Treatments:  PROM / STM / Oscillations-Mobs:  G-I, II, III, IV (PA's, Inf., Post.)  [x] (47656) Provided manual therapy to mobilize proximal hip and LS spine soft tissue/joints for the purpose of modulating pain, promoting relaxation,  increasing ROM, reducing/eliminating soft tissue swelling/inflammation/restriction, improving soft tissue extensibility and allowing for proper ROM for normal function with self care, mobility, lifting and ambulation.        Approval Dates:  CPT Code Units Approved Units Used  Date Updated:                     Charges:  Timed Code Treatment Minutes: 40   Total Treatment Minutes: 40     [] EVAL (LOW) 61031 (typically 20 minutes face-to-face)  [] EVAL (MOD) 90534 (typically 30 minutes face-to-face)  [] EVAL (HIGH) 63841 (typically 45 minutes face-to-face)  [] RE-EVAL     [x] AR(58274) x     [] Dry needle 1 or 2 Muscles (69271)  [] NMR (95865) x     [] Dry needle 3+ Muscles (26988)  [x] Manual (18075) x     [] Ultrasound (64360) x  [x] TA (11397) x     [] Mech Traction (42994)  [] ES(attended) (28204)     [] ES (un) (38746):   [] Vasopump (86505) [] Ionto (98696)   [] Other:    Pj Mejia stated goal: able to stand/ walk and prepare a meal for family. [x] Progressing: [] Met: [] Not Met: [] Adjusted  Therapist goals for Patient:   Short Term Goals: To be achieved in: 2 weeks  1. Independent in HEP and progression per patient tolerance, in order to prevent re-injury. [x] Progressing: [] Met: [] Not Met: [] Adjusted  2. Patient will have a decrease in pain to facilitate improvement in movement, function, and ADLs as indicated by Functional Deficits. [x] Progressing: [] Met: [] Not Met: [] Adjusted    Long Term Goals: To be achieved in: 6-10 weeks  1. Increase FOTO functional outcome score from 24 to 42 to assist with reaching prior level of function. [x] Progressing: [x] Met: [] Not Met: [] Adjusted  2. Patient will demonstrate increased AROM to WNL, good LS mobility, good hip ROM to allow for proper joint functioning as indicated by patients Functional Deficits. [x] Progressing: [] Met: [] Not Met: [] Adjusted  3. Patient will demonstrate an increase in Strength of lower quarter to be able to rise from chair at least 3x in 30sec. [x] Progressing: [] Met: [] Not Met: [] Adjusted  4. Patient will be able to reach arms overhead to at least 130* bilat to get closer to functional mobility. [x] Progressing: [] Met: [] Not Met: [] Adjusted  5. Pt will have no episodes of loss of bladder control over a 2 week period. [x] Progressing: [] Met: [] Not Met: [] Adjusted     ASSESSMENT:  Pt needs skilled PT to restore gross upper/ lower quarter functional strength and promote increased activity tolerance.      Quick reassessment today. Mild to mod relief of radicular sx in RLE w/ positional gapping for R lumbar spine. Educated him to cont working on functional strength/ activity tolerance work and also use new stretches for symptom modulation. Treatment/Activity Tolerance:  [] Patient tolerated treatment well [x] Patient limited by fatique  [x] Patient limited by pain  [] Patient limited by other medical complications  [] Other:     Overall Progression Towards Functional goals/ Treatment Progress Update:  [] Patient is progressing as expected towards functional goals listed. [] Progression is slowed due to complexities/Impairments listed. [] Progression has been slowed due to co-morbidities. [x] Plan just implemented, too soon to assess goals progression <30days   [] Goals require adjustment due to lack of progress  [] Patient is not progressing as expected and requires additional follow up with physician  [] Other:    Prognosis for POC: [x] Good [] Fair  [] Poor    Patient requires continued skilled intervention: [x] Yes  [] No        PLAN: See eval  [x] Continue per plan of care [] Alter current plan (see comments)  [] Plan of care initiated [] Hold pending MD visit [] Discharge    Electronically signed by: Deshawn Ferguson, PT , DPT  #[id number]          Note: If patient does not return for scheduled/recommended follow up visits, this note will serve as a discharge from care along with the most recent update on progress.

## 2022-11-21 ENCOUNTER — HOSPITAL ENCOUNTER (OUTPATIENT)
Dept: PHYSICAL THERAPY | Age: 52
Setting detail: THERAPIES SERIES
Discharge: HOME | End: 2022-11-21
Payer: COMMERCIAL

## 2022-11-21 ENCOUNTER — APPOINTMENT (OUTPATIENT)
Dept: CT IMAGING | Age: 52
End: 2022-11-21
Payer: COMMERCIAL

## 2022-11-21 ENCOUNTER — HOSPITAL ENCOUNTER (EMERGENCY)
Age: 52
Discharge: HOME | End: 2022-11-21
Attending: EMERGENCY MEDICINE
Payer: COMMERCIAL

## 2022-11-21 VITALS
BODY MASS INDEX: 27.11 KG/M2 | TEMPERATURE: 98.4 F | RESPIRATION RATE: 18 BRPM | HEART RATE: 56 BPM | SYSTOLIC BLOOD PRESSURE: 118 MMHG | OXYGEN SATURATION: 97 % | WEIGHT: 200.18 LBS | DIASTOLIC BLOOD PRESSURE: 70 MMHG | HEIGHT: 72 IN

## 2022-11-21 DIAGNOSIS — R10.33 PERIUMBILICAL ABDOMINAL PAIN: Primary | ICD-10-CM

## 2022-11-21 LAB
A/G RATIO: 1.7 (ref 1.1–2.2)
ALBUMIN SERPL-MCNC: 3.7 G/DL (ref 3.4–5)
ALP BLD-CCNC: 102 U/L (ref 40–129)
ALT SERPL-CCNC: 9 U/L (ref 10–40)
ANION GAP SERPL CALCULATED.3IONS-SCNC: 10 MMOL/L (ref 3–16)
AST SERPL-CCNC: 11 U/L (ref 15–37)
BASOPHILS ABSOLUTE: 0.1 K/UL (ref 0–0.2)
BASOPHILS RELATIVE PERCENT: 1.8 %
BILIRUB SERPL-MCNC: 0.3 MG/DL (ref 0–1)
BUN BLDV-MCNC: 11 MG/DL (ref 7–20)
CALCIUM SERPL-MCNC: 8.9 MG/DL (ref 8.3–10.6)
CHLORIDE BLD-SCNC: 104 MMOL/L (ref 99–110)
CO2: 26 MMOL/L (ref 21–32)
CREAT SERPL-MCNC: 1 MG/DL (ref 0.9–1.3)
EOSINOPHILS ABSOLUTE: 0.2 K/UL (ref 0–0.6)
EOSINOPHILS RELATIVE PERCENT: 2.4 %
GFR SERPL CREATININE-BSD FRML MDRD: >60 ML/MIN/{1.73_M2}
GLUCOSE BLD-MCNC: 91 MG/DL (ref 70–99)
HCT VFR BLD CALC: 37.2 % (ref 40.5–52.5)
HEMOGLOBIN: 12.6 G/DL (ref 13.5–17.5)
LIPASE: 69 U/L (ref 13–60)
LYMPHOCYTES ABSOLUTE: 2.5 K/UL (ref 1–5.1)
LYMPHOCYTES RELATIVE PERCENT: 38 %
MCH RBC QN AUTO: 30.4 PG (ref 26–34)
MCHC RBC AUTO-ENTMCNC: 33.9 G/DL (ref 31–36)
MCV RBC AUTO: 89.6 FL (ref 80–100)
MONOCYTES ABSOLUTE: 0.5 K/UL (ref 0–1.3)
MONOCYTES RELATIVE PERCENT: 7.1 %
NEUTROPHILS ABSOLUTE: 3.3 K/UL (ref 1.7–7.7)
NEUTROPHILS RELATIVE PERCENT: 50.7 %
PDW BLD-RTO: 15.5 % (ref 12.4–15.4)
PLATELET # BLD: 272 K/UL (ref 135–450)
PMV BLD AUTO: 8.2 FL (ref 5–10.5)
POTASSIUM REFLEX MAGNESIUM: 3.8 MMOL/L (ref 3.5–5.1)
RBC # BLD: 4.15 M/UL (ref 4.2–5.9)
SODIUM BLD-SCNC: 140 MMOL/L (ref 136–145)
TOTAL PROTEIN: 5.9 G/DL (ref 6.4–8.2)
WBC # BLD: 6.5 K/UL (ref 4–11)

## 2022-11-21 PROCEDURE — 74177 CT ABD & PELVIS W/CONTRAST: CPT

## 2022-11-21 PROCEDURE — 6360000002 HC RX W HCPCS: Performed by: EMERGENCY MEDICINE

## 2022-11-21 PROCEDURE — 96376 TX/PRO/DX INJ SAME DRUG ADON: CPT

## 2022-11-21 PROCEDURE — 85025 COMPLETE CBC W/AUTO DIFF WBC: CPT

## 2022-11-21 PROCEDURE — 36415 COLL VENOUS BLD VENIPUNCTURE: CPT

## 2022-11-21 PROCEDURE — 96375 TX/PRO/DX INJ NEW DRUG ADDON: CPT

## 2022-11-21 PROCEDURE — 80053 COMPREHEN METABOLIC PANEL: CPT

## 2022-11-21 PROCEDURE — 97110 THERAPEUTIC EXERCISES: CPT

## 2022-11-21 PROCEDURE — 96374 THER/PROPH/DIAG INJ IV PUSH: CPT

## 2022-11-21 PROCEDURE — 83690 ASSAY OF LIPASE: CPT

## 2022-11-21 PROCEDURE — 99285 EMERGENCY DEPT VISIT HI MDM: CPT

## 2022-11-21 PROCEDURE — 6360000004 HC RX CONTRAST MEDICATION: Performed by: EMERGENCY MEDICINE

## 2022-11-21 RX ORDER — METOCLOPRAMIDE 10 MG/1
10 TABLET ORAL 4 TIMES DAILY
Qty: 20 TABLET | Refills: 0 | Status: SHIPPED | OUTPATIENT
Start: 2022-11-21

## 2022-11-21 RX ORDER — DICYCLOMINE HYDROCHLORIDE 10 MG/1
10 CAPSULE ORAL EVERY 6 HOURS PRN
Qty: 20 CAPSULE | Refills: 0 | Status: SHIPPED | OUTPATIENT
Start: 2022-11-21

## 2022-11-21 RX ORDER — ONDANSETRON 2 MG/ML
4 INJECTION INTRAMUSCULAR; INTRAVENOUS ONCE
Status: COMPLETED | OUTPATIENT
Start: 2022-11-21 | End: 2022-11-21

## 2022-11-21 RX ADMIN — HYDROMORPHONE HYDROCHLORIDE 0.5 MG: 1 INJECTION, SOLUTION INTRAMUSCULAR; INTRAVENOUS; SUBCUTANEOUS at 21:21

## 2022-11-21 RX ADMIN — IOPAMIDOL 100 ML: 755 INJECTION, SOLUTION INTRAVENOUS at 20:48

## 2022-11-21 RX ADMIN — HYDROMORPHONE HYDROCHLORIDE 0.5 MG: 1 INJECTION, SOLUTION INTRAMUSCULAR; INTRAVENOUS; SUBCUTANEOUS at 20:07

## 2022-11-21 RX ADMIN — IOPAMIDOL 50 ML: 612 INJECTION, SOLUTION INTRAVENOUS at 19:30

## 2022-11-21 RX ADMIN — ONDANSETRON 4 MG: 2 INJECTION INTRAMUSCULAR; INTRAVENOUS at 20:06

## 2022-11-21 ASSESSMENT — ENCOUNTER SYMPTOMS
VOMITING: 0
ABDOMINAL PAIN: 1
NAUSEA: 1
WHEEZING: 0
BACK PAIN: 0
RHINORRHEA: 0
PHOTOPHOBIA: 0
SHORTNESS OF BREATH: 0
COLOR CHANGE: 0

## 2022-11-21 ASSESSMENT — PAIN SCALES - GENERAL
PAINLEVEL_OUTOF10: 9
PAINLEVEL_OUTOF10: 0
PAINLEVEL_OUTOF10: 8

## 2022-11-21 ASSESSMENT — PAIN DESCRIPTION - LOCATION
LOCATION: ABDOMEN
LOCATION: ABDOMEN

## 2022-11-21 ASSESSMENT — PAIN - FUNCTIONAL ASSESSMENT: PAIN_FUNCTIONAL_ASSESSMENT: NONE - DENIES PAIN

## 2022-11-21 NOTE — PROGRESS NOTES
[facility], [facility]  [address] [facility] [facility] [place], [facility]  Phone: [phone]   Fax:     [phone]    Physical Therapy Treatment Note/ Progress Report:     Date:  2022    Patient Name:  Nica Fleming    :  1970  MRN: [id number]    Pertinent Medical History:      Medical/Treatment Diagnosis Information:  Medical Diagnosis: Compression fracture of L1 lumbar vertebra ([address] [I38.462U]  Treatment Diagnosis: back pain, limited mobility, activity tolerance, gait dysfunction, bladder dysfunction    Insurance/Certification information:     Physician Information:  Edwin Ortiz MD  Plan of care signed (Y/N):     Date of Patient follow up with Physician:      Progress Report: []  Yes  [x]  No     Date Range for reporting period:  Beginning: , 22  Ending:    Progress report due (10 Rx/or 30 days whichever is less): #, #43    Recertification due (POC duration/ or 90 days whichever is less):     Visit # POC/ Insurance Allowable Auth Needed    Sender []Yes    []No     Functional Outcomes Measure:   Date Assessed: at eval  Test:FOTO (24-41)  Score:    : 44pts    Pain level:  /10     History of Injury: Pt here for evaluation for c/o back and R leg pain. He reports a long hx of a chronic pain, but about a year ago, he had a fall and he sustained compression fracture of L1 and had kyphoplasty around March. He wore a lumbar brace up until a few months ago. That seemed to help with pain for a month or two, but then things started to get bad. He now reports for the past 4-6 weeks that he had entire RLE pain numbness into his foot. He also reports along that same time frame that he has many instances at night where he loses control of his bladder, and also has difficulty raising arms overhead which apparently is somewhat new.  He has been very diligent about setting an alarm to go to the restroom every 2 hours to prevent accidents during the day. The past few weeks, he reports increased episodes of bed wetting at night. He last saw spine surgeon a few weeks ago regarding bed wetting and it was recommended to try epidural and possibly surgery. He apparently was denied epidurals and was told to try PT first by his insurance. He is ambulating w/ cane, but states his gait has worsened lately and he has considered using a walker at times. After initial injury, he states compression fracture was around 10% compressed, now he states its around 40% and he was told when it reaches 50%, he has to have surgery due to pressure on spinal cord. Next f/u w/ surgeon isn't till middle of next month. He also has osteoporosis and he attributes that to malnutrition from his bypass surgery which was many years ago. He has tried taking calcium/ D supplements and also tried a Rx medicine for osteoporosis, but per his recent Dexa scans, he states his numbers didn't change much. He is going to follow up with gastro doctor next month sometime. He is wearing a TLSO and was told to wear it as much as he can. He was wearing a lumbar support after surgery, but now has this TLSO. He walks w/ a cane. At his last f/u w/ surgeon, he had just started to have severe pain and also loss of bladder control; per his report this has worsened a great deal since that time. He has tried to contact their office, but only got thru to his . *RLE pain: >75% of the day. Relieved only partially w/ pain meds. Worse in the morning; sleeps in his TLSO due to following instructions to wear it as much as possible. 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       *future: discuss diet (vegan?)  *PT recommended f/u w/ PCP re: bone density and cont'd worsening bladder dysfunction/ UE/LE strength deficits    *primary problems:  - new increased RLE pain (intermittent, but lasts for hours at a time)  - new numbness along L4/5 dermatome    *Prior function:   - walk dogs 2 miles, 2x/day   - ride 50cc scooter  - housework: 1/2 day at a time  - able to carry musician equipment  - wood working    *Current function:   - ~15min of housework,   - son needs to carry musician equipment (40lbs) (last able to do this 1+ yrs ago)     Imaging:  Impression       1. Mild nonspecific marrow edema involving the base of the T7 spinous process. No correlate is seen on CT chest dated 3/21/2020. Consider CT thoracic spine to exclude a nondisplaced fracture. 2.   Chronic T5, T8, L1 compression fractures. 3.  Thoracic spondylosis resulting in mild T7-8 and T9-10 spinal canal stenoses and mild left T6-7 and T7-8 neural foraminal narrowing. *L1 comp fx was 75% ht loss in Sept 2021  *40% in Jan 2022  *40%  in Sept 2022    Hx: CVA in 2014: L side effected; moderate deficits at that time. SUBJECTIVE:    10/27: saw PCP earlier and feels a lot better about the direction he is headed. We did review his symptoms quite a bit and his biggest concern is new RLE pain and weakness. He is used to the chronic back pain. 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 Typically of late, he has only paid for it w/ pain/ fatigue x 1 day, not 2. He states he feels extremely tired today. After getting ready/ showered, he felt like he could go back to sleep for a long while. States his RLE pain/ numbness actually hasn't been as bad the past few days. Mostly biggest problems of significant low and mid back pain. 11/3: Has been sore since doing the prone and Qped exercises prior session. Was wondering if wearing his back brace during activities may have attributed to it as he will sometimes sleep on his stomach. Patient reports that he has increased his walking at home. F/U with surgeon at the end of the month and f/u with urologist next week. 11/7: RLE pain about the same, but moving better. He has already walked about 1/2 to 1 mile this AM walking dogs. He also reports gait stability feels better. 11/10: 15 min late - Came from appt at [facility]. Did okay after last session. A bit worn out today. Has already walked with the dogs  11/14: pt states numbness isn't as bad, weakness in RLE still present, mostly at end of the day. Started new med from urologist, and having less accidents at night also. No change in amount of back pain, but he notices he is more aware of posture and spends less time in slouched posture. 11/17: pt reports overall, not a great deal of change with back or RLE pain/ numbness/ weakness. He does feel generally stronger and able to sit up straighter and walk a little further. Periodic numbness about 1/2 the day, RLE pain is nearly constant at 6/10. F/u w/ spine surgeon next week. He recently found out that he will need another stomach surgery due to hernia in early Dec.   11/21: pt report doing about the same, still same RLE pain and back pain. He is to f/u w/ surgeon later today to discuss possible epidurals or other options. OBJECTIVE:   Observation:   Test measurements:    11/17: lumbar flex: WFL, RLE radic sx reproduced w/ R lateral flex. Standing posture reveals slight lumbar curvature with concavity to R. Possible leg length difference approx 1/4 to 1/2\" with R longer, but trial of heel lift did not help and made him feel more uneven.   ROM 10/24 11/17 11/21                                                     Strength         Hip flex         Knee ext  R=4/5  L=5/5       Knee flex  R=4/5  L=5/5       Ankle DF/ Ev  R=4+/5  L=5/5                         TESTS/ OTHER         SLR  R: pos @ 20*       Crossed SLR  L pos @ 45* w/ R sided LBP       30s STS Unable to perform  8x      TUG 21sec w/ cane  8.3sec, no AD                   RESTRICTIONS/PRECAUTIONS: L1 kyphoplasty, T5 & T8 comp fx, chronic pain, worsening gait/ UE/LE weakness    Exercises/Interventions: ambulation. [x] (27194) Provided verbal/tactile cueing for activities related to improving balance, coordination, kinesthetic sense, posture, motor skill, proprioception  to assist with core control in self care, mobility, lifting, and ambulation. Therapeutic Activities:    [x] (18075 or 05318) Provided verbal/tactile cueing for activities related to improving balance, coordination, kinesthetic sense, posture, motor skill, proprioception and motor activation to allow for proper function  with self care and ADLs  [x] (94331) Provided training and instruction to the patient for proper core and proximal hip recruitment and positioning with ambulation re-education     Home Exercise Program:    [x] (26475) Reviewed/Progressed HEP activities related to strengthening, flexibility, endurance, ROM of core, proximal hip and LE for functional self-care, mobility, lifting and ambulation   [x] (90757) Reviewed/Progressed HEP activities related to improving balance, coordination, kinesthetic sense, posture, motor skill, proprioception of core, proximal hip and LE for self care, mobility, lifting, and ambulation      Manual Treatments:  PROM / STM / Oscillations-Mobs:  G-I, II, III, IV (PA's, Inf., Post.)  [x] (82995) Provided manual therapy to mobilize proximal hip and LS spine soft tissue/joints for the purpose of modulating pain, promoting relaxation,  increasing ROM, reducing/eliminating soft tissue swelling/inflammation/restriction, improving soft tissue extensibility and allowing for proper ROM for normal function with self care, mobility, lifting and ambulation.        Approval Dates:  CPT Code Units Approved Units Used  Date Updated:                     Charges:  Timed Code Treatment Minutes: 40   Total Treatment Minutes: 40     [] EVAL (LOW) 68120 (typically 20 minutes face-to-face)  [] EVAL (MOD) 75276 (typically 30 minutes face-to-face)  [] EVAL (HIGH) 78281 (typically 45 minutes face-to-face)  [] RE-EVAL     [x] RP(78402) x   3  [] Dry needle 1 or 2 Muscles (06355)  [] NMR (78949) x     [] Dry needle 3+ Muscles (81777)  [] Manual (40166) x     [] Ultrasound (99472) x  [] TA (90824) x     [] Mech Traction (16805)  [] ES(attended) (09347)     [] ES (un) (76124):   [] Vasopump (83476) [] Ionto (75340)   [] Other:    Haney Settles stated goal: able to stand/ walk and prepare a meal for family. [x] Progressing: [] Met: [] Not Met: [] Adjusted  Therapist goals for Patient:   Short Term Goals: To be achieved in: 2 weeks  1. Independent in HEP and progression per patient tolerance, in order to prevent re-injury. [x] Progressing: [] Met: [] Not Met: [] Adjusted  2. Patient will have a decrease in pain to facilitate improvement in movement, function, and ADLs as indicated by Functional Deficits. [x] Progressing: [] Met: [] Not Met: [] Adjusted    Long Term Goals: To be achieved in: 6-10 weeks  1. Increase FOTO functional outcome score from 24 to 42 to assist with reaching prior level of function. [x] Progressing: [x] Met: [] Not Met: [] Adjusted  2. Patient will demonstrate increased AROM to WNL, good LS mobility, good hip ROM to allow for proper joint functioning as indicated by patients Functional Deficits. [x] Progressing: [] Met: [] Not Met: [] Adjusted  3. Patient will demonstrate an increase in Strength of lower quarter to be able to rise from chair at least 3x in 30sec. [x] Progressing: [] Met: [] Not Met: [] Adjusted  4. Patient will be able to reach arms overhead to at least 130* bilat to get closer to functional mobility. [x] Progressing: [] Met: [] Not Met: [] Adjusted  5. Pt will have no episodes of loss of bladder control over a 2 week period. [x] Progressing: [] Met: [] Not Met: [] Adjusted     ASSESSMENT:  Pt needs skilled PT to restore gross upper/ lower quarter functional strength and promote increased activity tolerance. Jennifer session well.  No sig incr in leg pain today, mild to mod global body/ lower quarter fatigue. Treatment/Activity Tolerance:  [] Patient tolerated treatment well [x] Patient limited by fatique  [x] Patient limited by pain  [] Patient limited by other medical complications  [] Other:     Overall Progression Towards Functional goals/ Treatment Progress Update:  [] Patient is progressing as expected towards functional goals listed. [] Progression is slowed due to complexities/Impairments listed. [] Progression has been slowed due to co-morbidities. [x] Plan just implemented, too soon to assess goals progression <30days   [] Goals require adjustment due to lack of progress  [] Patient is not progressing as expected and requires additional follow up with physician  [] Other:    Prognosis for POC: [x] Good [] Fair  [] Poor    Patient requires continued skilled intervention: [x] Yes  [] No        PLAN: See eval  [x] Continue per plan of care [] Alter current plan (see comments)  [] Plan of care initiated [] Hold pending MD visit [] Discharge    Electronically signed by: Tracy Lloyd PT , DPT  #[id number]          Note: If patient does not return for scheduled/recommended follow up visits, this note will serve as a discharge from care along with the most recent update on progress.

## 2022-11-22 NOTE — PROGRESS NOTES
[facility] [facility] time _____0930_______        Surgery time _____1100_______    Take the following medications with a sip of water: Follow your MD/Surgeons pre-procedure instructions regarding your medications     Do not eat or drink anything after 12:00 midnight prior to your surgery. This includes water chewing gum, mints and ice chips. You may brush your teeth and gargle the morning of your surgery, but do not swallow the water     Please see your family doctor/pediatrician for a history and physical and/or concerning medications. Bring any test results/reports from your physicians office. If you are under the care of a heart doctor or specialist doctor, please be aware that you may be asked to them for clearance    You may be asked to stop blood thinners such as Coumadin, Plavix, Fragmin, Lovenox, etc., or any anti-inflammatories such as:  Aspirin, Ibuprofen, Advil, Naproxen prior to your surgery. We also ask that you stop any OTC medications such as fish oil, vitamin E, glucosamine, garlic, Multivitamins, COQ 10, etc.    We ask that you do not smoke 24 hours prior to surgery  We ask that you do not  drink any alcoholic beverages 24 hours prior to surgery     You must make arrangements for a responsible adult to take you home after your surgery. For your safety you will not be allowed to leave alone or drive yourself home. Your surgery will be cancelled if you do not have a ride home. Also for your safety, it is strongly suggested that someone stay with you the first 24 hours after your surgery. A parent or legal guardian must accompany a child scheduled for surgery and plan to stay at the hospital until the child is discharged. Please do not bring other children with you. For your comfort, please wear simple loose fitting clothing to the hospital.  Please do not bring valuables.     Do not wear any make-up or nail polish on your fingers or toes      For your safety, please do not wear any jewelry or body piercing's on the day of surgery. All jewelry must be removed. If you have dentures, they will be removed before going to operating room. For your convenience, we will provide you with a container. If you wear contact lenses or glasses, they will be removed, please bring a case for them. If you have a living will and a durable power of  for healthcare, please bring in a copy. As part of our patient safety program to minimize surgical site infections, we ask you to do the following:    Please notify your surgeon if you develop any illness between         now and the  day of your surgery. This includes a cough, cold, fever, sore throat, nausea,         or vomiting, and diarrhea, etc.   Please notify your surgeon if you experience dizziness, shortness         of breath or blurred vision between now and the time of your surgery. Do not shave your operative site 96 hours prior to surgery. For face and neck surgery, men may use an electric razor 48 hours   prior to surgery. You may shower the night before surgery or the morning of   your surgery with an antibacterial soap. You will need to bring a photo ID and insurance card    [facility] has an onsite pharmacy, would you like to utilize our pharmacy     If you will be staying overnight and use a C-pap machine, please bring   your C-pap to hospital     Our goal is to provide you with excellent care, therefore, visitors will be limited to two(2) in the room at a time so that we may focus on providing this care for you. Please contact pre-admission testing if you have any further questions. [facility] phone number:  [phone] PAT fax number:  [phone]  Please note these are generalized instructions for all surgical cases, you may be provided with more specific instructions according to your surgery.     C-Difficile admission screening and protocol:       * Admitted with diarrhea? [] YES    [x]  NO     *Prior history of C-Diff. In last 3 months? [] YES    [x]  NO     *Antibiotic use in the past 6-8 weeks? []  NO    [x]  YES                 If yes, which ANTIBIOTIC AND REASON___infected loop recorder___     *Prior hospitalization or nursing home in the last month? []  YES    [x]  NO        SAFETY FIRST. .call before you fall

## 2022-11-22 NOTE — ED PROVIDER NOTES
[address] [facility]  EMERGENCY DEPARTMENTENCOUNTER      Pt Name: Joseph Menjivar  MRN: [id number]  [place] 1970  Date ofevaluation: 11/21/2022  Provider: Sophy Melvin MD    CHIEF COMPLAINT       Chief Complaint   Patient presents with    Abdominal Pain     Exacerbation of chronic abd pain, mid-abd, radiates to RLQ. With n/v         HISTORY OF PRESENT ILLNESS   (Location/Symptom, Timing/Onset,Context/Setting, Quality, Duration, Modifying Factors, Severity)  Note limiting factors. Joseph Menjivar is a 47 y.o. male  who  has a past medical history of Alcoholism ([address] Allergic migraine with status migrainosus, Allergic rhinitis, seasonal, Anemia, Arthritis, Vaughn's esophagus, Benign intracranial hypertension, Cancer ([address] Carpal tunnel syndrome, Chronic pain, Depression, Ear infection, GERD (gastroesophageal reflux disease), Headache(784.0), History of blood transfusion, History of spinal fracture, History of tobacco use, Left wrist fracture, Migraine, Morbid obesity ([address] Movement disorder, Onychomycosis, Sleep apnea, obstructive, Stroke ([address] Unspecified cerebral artery occlusion with cerebral infarction, Wears dentures, and Wears glasses. who presents to the emergency department for evaluation of periumbilical abdominal pain. Patient reports an extensive history of previous abdominal surgeries and a known ventral hernia from previous surgeries. Reports that he recently was seen by physician for planned reduction. Patient states that he began developing progressively worsening pain since the previous day. Denies fevers and states he been having normal bowel movements and has been passing flatus. Reports nausea but denies vomiting. Reports the pain is a periumbilical area and radiates to the right. Denies remitting or exacerbating factors. Patient reports a history of previous obstructions.      Reviewing patient's medical record is been seen with similar presentation 2 times previously this month. Previous imaging study did demonstrate some inflammation of the transverse colon. Patient was report was reviewed. He has here to receive narcotic pain medications chronically but has also been receiving intermittent prescriptions. HPI    NursingNotes were reviewed. REVIEW OF SYSTEMS    (2-9 systems for level 4, 10 or more for level 5)     Review of Systems   Constitutional:  Negative for activity change, chills and fever. HENT:  Negative for congestion and rhinorrhea. Eyes:  Negative for photophobia and visual disturbance. Respiratory:  Negative for shortness of breath and wheezing. Cardiovascular:  Negative for palpitations and leg swelling. Gastrointestinal:  Positive for abdominal pain and nausea. Negative for vomiting. Endocrine: Negative for polydipsia and polyuria. Genitourinary:  Negative for difficulty urinating, frequency and urgency. Musculoskeletal:  Negative for back pain and gait problem. Skin:  Negative for color change and rash. Neurological:  Negative for light-headedness and headaches. Psychiatric/Behavioral:  Negative for confusion. The patient is not nervous/anxious. All other systems reviewed and are negative. Except as noted above the remainder of the review of systems was reviewed and negative.        PAST MEDICAL HISTORY     Past Medical History:   Diagnosis Date    Alcoholism ([facility]     last drink 2015    Allergic migraine with status migrainosus     Allergic rhinitis, seasonal     Anemia     Arthritis     right knee    Vaughn's esophagus     Benign intracranial hypertension     Cancer ([facility])     renal     Carpal tunnel syndrome     Chronic pain     Depression     Ear infection 05/06/2022    GERD (gastroesophageal reflux disease)     Headache(784.0)     History of blood transfusion     History of spinal fracture 2021    L1,L7,L8 fall T5,T8  - unknown 2022    History of tobacco use     Quit 7/2014    Left wrist fracture 2020 fall    Migraine     chronic for 1 year    Morbid obesity ([address]     Movement disorder     Onychomycosis     Sleep apnea, obstructive     Severe uses cpap stop bang 6    Stroke [facility]) 2014    Unspecified cerebral artery occlusion with cerebral infarction 2014    slight weakness in left arm    Wears dentures     full set    Wears glasses          SURGICALHISTORY       Past Surgical History:   Procedure Laterality Date    ABDOMEN SURGERY      gastric bypass    ABDOMEN SURGERY  4-7-2016    repair of recurrent incisional hernia with mesh, removal of old mesh, bilateral component separation    ABDOMINAL EXPLORATION SURGERY  1/11/16    exp lap, lysis of adhesions, small bowel resection    CARPAL TUNNEL RELEASE      bilat    COLONOSCOPY N/A 5/31/2022    COLONOSCOPY POLYPECTOMY SNARE/COLD BIOPSY performed by Miguel Dave MD at [address], COLON, DIAGNOSTIC      EYE SURGERY      GASTRIC BYPASS SURGERY  Jan 2009    Has lost about 200 pounds. HERNIA REPAIR  3-    ventral    JOINT REPLACEMENT      KIDNEY REMOVAL Left 08/01/2018    KNEE ARTHROSCOPY Right 7/11/2013    Dr.Robert WaltersAdelina     KNEE ARTHROSCOPY Right 7/11/12    RIGHT KNEE ARTHROSCOPY WITH CHONDROPLASTY    KNEE SURGERY  July 2012    right, arthroscopy    KNEE SURGERY Right 2/18/2020    INCISION AND DRAINAGE RIGHT KNEE AND WOULD VAC PLACEMENT performed by Harjinder Ovalles MD at [place] [address] Right 2/26/2021    INCISION AND DRAINAGE OF RIGHT KNEE HEMATOMA performed by Mathis Koyanagi, MD at [place] [address] Right 3/5/2021    INCISION AND DRAINAGE AND CLOSURE RIGHT TOTAL KNEE performed by Mathis Koyanagi, MD at [address] [place] [address]  2005    OTHER SURGICAL HISTORY Left 03/08/2016    CT biopsy ablasion left kidney    OTHER SURGICAL HISTORY  2016    small intestine 12 inch removed, 2 hernia surgeries, another surgery to drain infection from incision.      REVISION TOTAL KNEE ARTHROPLASTY Right 12/17/2019    RIGHT REVISION TIBIA TOTAL KNEE REPLACEMENT performed by Kathy Elise MD at [place] [address] Right 1/22/2020    RIGHT KNEE IRRIGATION AND DEBRIDEMENT WITH POLY EXCHANGE performed by Sachin Bernardo MD at [place] [address] Right 2/16/2021    RIGHT REMOVAL OF EXPLANT AND TOTAL KNEE REPLACEMENT performed by Wesley Magallanes MD at [address] [facility]  10/15/13    RIGHT    TOTAL KNEE ARTHROPLASTY Right 8/12/2020    RIGHT ARTHROPLASY  RESECTION WITH INSERTION OF SPACER performed by Kathy Elise MD at [place] [address]  6-7-2016    UPPER GASTROINTESTINAL ENDOSCOPY  04/02/2018    UPPER GASTROINTESTINAL ENDOSCOPY N/A 3/24/2022    ESOPHAGOGASTRODUODENOSCOPY performed by Isabella García MD at [address]  5/31/2022    ESOPHAGOGASTRODUODENOSCOPY WITH BIOPSY [facility]       Previous Medications    ATORVASTATIN (LIPITOR) 40 MG TABLET    TAKE 1 TABLET BY MOUTH EVERYDAY AT BEDTIME    CALCIUM-VITAMIN D (OSCAL-500) 500-200 MG-UNIT PER TABLET    Take 1 tablet by mouth 2 times daily     CLOPIDOGREL (PLAVIX) 75 MG TABLET    TAKE 1 TABLET BY MOUTH EVERY DAY    CYANOCOBALAMIN 1000 MCG/ML INJECTION    Inject into the muscle    FLUOXETINE (PROZAC) 20 MG CAPSULE    Take 1 capsule by mouth daily    GABAPENTIN (NEURONTIN) 600 MG TABLET    Take 600 mg by mouth 4 times daily.      LIPASE-PROTEASE-AMYLASE (CREON) 03012-76666 UNITS DELAYED RELEASE CAPSULE    Take 1 capsule by mouth 3 times daily (with meals)    MULTIPLE VITAMIN TABS    Take 1 tablet by mouth daily     ONDANSETRON (ZOFRAN ODT) 4 MG DISINTEGRATING TABLET    Take 1 tablet by mouth every 8 hours as needed for Nausea    OXYCODONE (ROXICODONE) 5 MG IMMEDIATE RELEASE TABLET    Take 1 tablet by mouth every 6 hours as needed for Pain for up to 30 days. PANTOPRAZOLE (PROTONIX) 40 MG TABLET    TAKE 1 TABLET BY MOUTH TWICE A DAY    SILDENAFIL (REVATIO) 20 MG TABLET    Take 4 tablets by mouth as needed (30 min prior to intercourse)    TRAZODONE (DESYREL) 100 MG TABLET    Take 2 tablets by mouth nightly            Nsaids, Morphine, Prochlorperazine, Valtrex [valacyclovir hcl], Fentanyl, Morphine and related, and Tolmetin    FAMILY HISTORY       Family History   Problem Relation Age of Onset    Arthritis Mother     Osteoporosis Mother     Cancer Father         Lymphoma    Heart Disease Maternal Uncle     Heart Disease Maternal Uncle     Diabetes Maternal Uncle           SOCIAL HISTORY       Social History     Socioeconomic History    Marital status:      Spouse name: Shayla Allen    Number of children: 2   Occupational History    Occupation: [employer], [employer] his son.    Tobacco Use    Smoking status: Every Day     Packs/day: 0.50     Years: 25.00     Pack years: 12.50     Types: Cigarettes     Start date: 1985     Last attempt to quit: 2021     Years since quittin.5    Smokeless tobacco: Never    Tobacco comments:     Patient vapes   Vaping Use    Vaping Use: Former    Substances: Nicotine, THC    Devices: Refillable tank, Pre-filled pod   Substance and Sexual Activity    Alcohol use: No     Alcohol/week: 0.0 standard drinks     Comment: last drink     Drug use: Yes     Types: Marijuana Seabron Barban)     Comment: medical [employer] card    Sexual activity: Yes     Partners: Female     Comment: wife Jennifer Ayala    [place] Coma Scale  Eye Opening: Spontaneous  Best Verbal Response: Oriented  Best Motor Response: Obeys commands  [place] Coma Scale Score: 15        PHYSICAL EXAM    (up to 7 for level 4, 8 or more for level 5)     ED Triage Vitals [22 1826]   BP Temp Temp Source Heart Rate Resp SpO2 Height Weight   118/70 98.4 °F (36.9 °C) Oral 56 16 97 % 6' (1.829 m) 200 lb 2.8 oz (90.8 kg)       Physical Exam  Vitals reviewed. Constitutional:       General: He is not in acute distress. Appearance: He is well-developed. HENT:      Head: Normocephalic and atraumatic. Eyes:      Conjunctiva/sclera: Conjunctivae normal.   Neck:      Trachea: No tracheal deviation. Cardiovascular:      Rate and Rhythm: Normal rate and regular rhythm. Pulmonary:      Effort: Pulmonary effort is normal.      Breath sounds: Normal breath sounds. No wheezing or rales. Abdominal:      General: There is no distension. Palpations: Abdomen is soft. Tenderness: There is abdominal tenderness in the periumbilical area. There is no guarding or rebound. Hernia: A hernia is present. Musculoskeletal:         General: No deformity. Normal range of motion. Cervical back: Normal range of motion. Skin:     General: Skin is warm and dry. Capillary Refill: Capillary refill takes less than 2 seconds. Neurological:      Mental Status: He is alert and oriented to person, place, and time.        RESULTS     EKG: All EKG's are interpreted by the Emergency Department Physician who either signs or Co-signsthis chart in the absence of a cardiologist.        RADIOLOGY:   Lee Abanda such as CT, Ultrasound and MRI are read by the radiologist. Plain radiographic images are visualized and preliminarily interpreted by the emergency physician with the below findings:        Interpretation per the Radiologist below, if available at the time ofthis note:    CT ABDOMEN PELVIS W IV CONTRAST Additional Contrast? Oral    (Results Pending)         ED BEDSIDE ULTRASOUND:   Performed by ED Physician - none    LABS:  Labs Reviewed   CBC WITH AUTO DIFFERENTIAL - Abnormal; Notable for the following components:       Result Value    RBC 4.15 (*)     Hemoglobin 12.6 (*)     Hematocrit 37.2 (*)     RDW 15.5 (*)     All other components within normal limits   COMPREHENSIVE METABOLIC PANEL W/ REFLEX TO MG FOR LOW K - Abnormal; Notable for the following components: Total Protein 5.9 (*)     ALT 9 (*)     AST 11 (*)     All other components within normal limits   LIPASE - Abnormal; Notable for the following components:    Lipase 69.0 (*)     All other components within normal limits   URINALYSIS WITH REFLEX TO CULTURE       All other labs were within normal range or not returned as of this dictation. EMERGENCY DEPARTMENT COURSE and DIFFERENTIAL DIAGNOSIS/MDM:   Vitals:    Vitals:    11/21/22 1826 11/21/22 2121   BP: 118/70    Pulse: 56    Resp: 16 18   Temp: 98.4 °F (36.9 °C)    TempSrc: Oral    SpO2: 97%    Weight: 200 lb 2.8 oz (90.8 kg)    Height: 6' (1.829 m)        Patient was given thefollowing medications:  Medications   ondansetron (ZOFRAN) injection 4 mg (4 mg IntraVENous Given 11/21/22 2006)   HYDROmorphone (DILAUDID) injection 0.5 mg (0.5 mg IntraVENous Given 11/21/22 2007)   iopamidol (ISOVUE-300) 61 % injection 50 mL (50 mLs Other Given 11/21/22 1930)   iopamidol (ISOVUE-370) 76 % injection 100 mL (100 mLs IntraVENous Given 11/21/22 2048)   HYDROmorphone (DILAUDID) injection 0.5 mg (0.5 mg IntraVENous Given 11/21/22 2121)       ED COURSE & MEDICAL DECISION MAKING    Pertinent Labs & Imaging studies reviewed. (See chart for details)   -  Patient seen and evaluated in the emergency department. -  Triage and nursing notes reviewed and incorporated. -  Old chart records reviewed and incorporated. -  Differential diagnosis includes: Differential diagnosis: Abdominal Aortic Aneurysm, Acute Coronary Syndrome, Ischemic Bowel, Bowel Obstruction (including Gastric Outlet Obstruction), PUD, GERD, Acute Cholecystitis, Pancreatitis, Hepatitis, Colitis, SMA Syndrome, Mesenteric Steal Syndrome, Splanchnic Vein Thrombosis, Appendicitis, Diverticulitis, Pyelonephritis, UTI, STD, Gonad Torsion, other     -  Work-up included:  See above  -  ED treatment included: See above  -  Results discussed with patient.   56-year-old male with an extensive history of abdominal surgeries and cannot abdominal pain presents for abdominal pain. On presentation vital signs within normal limits. Patient does have periumbilical tenderness. Do not appreciate any nonreducible hernia or mass. No rebound or guarding. labs show no emergent laboratory normalities. Imaging studies show no acute findings on CT of the abdomen. No obstruction, hernia, abscess or signs of ileus. Patient feels improved on reevaluation. Symptomatic treatment with expectant management discussed with the patient and they and/or family members present are amenable to treatment plan and outpatient follow-up. Strict return precautions were discussed with the patient and those present. They demonstrated understanding of when to return to the emergency department for new or worsening symptoms. .  The patient is agreeable with plan of care and disposition. Is this patient to be included in the SEP-1 Core Measure due to severe sepsis or septic shock? No   Exclusion criteria - the patient is NOT to be included for SEP-1 Core Measure due to: Infection is not suspected      REASSESSMENT          CRITICAL CARE TIME   Total Critical Care time was 10 minutes, excluding separately reportable procedures. There was a high probability of clinically significant/life threatening deterioration in the patient's condition which required my urgent intervention. CONSULTS:  None    PROCEDURES:  Unless otherwise noted below, none     Procedures    FINAL IMPRESSION      1. Periumbilical abdominal pain          DISPOSITION/PLAN   DISPOSITION        PATIENT REFERREDTO:  No follow-up provider specified.     DISCHARGEMEDICATIONS:  New Prescriptions    No medications on file          (Please note that portions of this note were completed with a voice recognition program.  Efforts were made to edit the dictations but occasionally words are mis-transcribed.)    De Renteria MD (electronically signed)  Attending Emergency Physician          Chrissie Primrose, MD  11/22/22 4277

## 2022-11-23 ENCOUNTER — HOSPITAL ENCOUNTER (OUTPATIENT)
Dept: PHYSICAL THERAPY | Age: 52
Setting detail: THERAPIES SERIES
Discharge: HOME | End: 2022-11-23
Payer: COMMERCIAL

## 2022-11-23 ENCOUNTER — OFFICE VISIT (OUTPATIENT)
Dept: ENDOCRINOLOGY | Age: 52
End: 2022-11-23
Payer: COMMERCIAL

## 2022-11-23 VITALS
BODY MASS INDEX: 28.95 KG/M2 | DIASTOLIC BLOOD PRESSURE: 61 MMHG | WEIGHT: 202.2 LBS | HEIGHT: 70 IN | SYSTOLIC BLOOD PRESSURE: 113 MMHG

## 2022-11-23 DIAGNOSIS — M80.08XD VERTEBRAL FRACTURE, OSTEOPOROTIC, WITH ROUTINE HEALING, SUBSEQUENT ENCOUNTER: ICD-10-CM

## 2022-11-23 DIAGNOSIS — M81.0 AGE-RELATED OSTEOPOROSIS WITHOUT CURRENT PATHOLOGICAL FRACTURE: ICD-10-CM

## 2022-11-23 DIAGNOSIS — M81.0 AGE-RELATED OSTEOPOROSIS WITHOUT CURRENT PATHOLOGICAL FRACTURE: Primary | ICD-10-CM

## 2022-11-23 LAB — PARATHYROID HORMONE INTACT: 48.3 PG/ML (ref 14–72)

## 2022-11-23 PROCEDURE — 99205 OFFICE O/P NEW HI 60 MIN: CPT | Performed by: INTERNAL MEDICINE

## 2022-11-23 PROCEDURE — 99417 PROLNG OP E/M EACH 15 MIN: CPT | Performed by: INTERNAL MEDICINE

## 2022-11-23 PROCEDURE — 97110 THERAPEUTIC EXERCISES: CPT

## 2022-11-23 RX ORDER — PEN NEEDLE, DIABETIC 30 GX5/16"
NEEDLE, DISPOSABLE MISCELLANEOUS
Qty: 90 EACH | Refills: 4 | Status: SHIPPED | OUTPATIENT
Start: 2022-11-23

## 2022-11-23 NOTE — LETTER
[address] and Osteoporosis  [address] [facility], [address] [place] [address],[place] [address]  Phone [phone]  Fax [phone]         2022         Tila Carranza MD                            Re:  Skye Pettit,  1970    Dear Dr. Andres Hodgkin: Thank you for asking me to see Skye Pettit in consultation. As you know, Mr. Claribel Cameron is a 47 y.o. man who experienced an L1 compression fracture 2021 with persistent deformity and hopes of surgical correction. Bone density is low but did not change significantly [id number]. We reviewed life-style issues (calcium, vitamin D and physical activity). I have ordered some diagnostic tests and will be back in touch when I have the results. Without effective treatment, fracture risk is high. I recommended treatment with teriparatide (Forteo) and he agreed. We will check on insurance coverage and make the necessary arrangements. He is a current smoker which is bad for bone structure and fracture risk and increases the risk of complications of spinal fusion. We strongly urged him to stop smoking and gave him contact information for a smoking cessation counselor. Enclosed is a copy of my consultation note. Please let me know if you have any questions. Sincerely,    Cornelia Cruz. [email or web address]     Encl.  Copy of consult note      CC: Raul Stone MD

## 2022-11-23 NOTE — PROGRESS NOTES
Clint Landeros and [facility]  [address] [facility]  Phone: [phone]   Fax:     [phone]    Physical Therapy Treatment Note/ Progress Report:     Date:  2022    Patient Name:  Lenin Trujillo    :  1970  MRN: [id number]    Pertinent Medical History:      Medical/Treatment Diagnosis Information:  Medical Diagnosis: Compression fracture of L1 lumbar vertebra ([address] [C35.958Y]  Treatment Diagnosis: back pain, limited mobility, activity tolerance, gait dysfunction, bladder dysfunction    Insurance/Certification information:     Physician Information:  Kusum Casillas MD  Plan of care signed (Y/N):     Date of Patient follow up with Physician:      Progress Report: []  Yes  [x]  No     Date Range for reporting period:  Beginning: , 22  Ending:    Progress report due (10 Rx/or 30 days whichever is less): #, #30    Recertification due (POC duration/ or 90 days whichever is less):     Visit # POC/ Insurance Allowable Auth Needed   10 /  Sriram Charles []Yes    []No     Functional Outcomes Measure:   Date Assessed: at eval  Test:FOTO (24-41)  Score:    : 44pts    Pain level: 5 /10     History of Injury: Pt here for evaluation for c/o back and R leg pain. He reports a long hx of a chronic pain, but about a year ago, he had a fall and he sustained compression fracture of L1 and had kyphoplasty around March. He wore a lumbar brace up until a few months ago. That seemed to help with pain for a month or two, but then things started to get bad. He now reports for the past 4-6 weeks that he had entire RLE pain numbness into his foot. He also reports along that same time frame that he has many instances at night where he loses control of his bladder, and also has difficulty raising arms overhead which apparently is somewhat new.  He has been very diligent about setting an alarm to go to the restroom every 2 hours to prevent accidents during the day. The past few weeks, he reports increased episodes of bed wetting at night. He last saw spine surgeon a few weeks ago regarding bed wetting and it was recommended to try epidural and possibly surgery. He apparently was denied epidurals and was told to try PT first by his insurance. He is ambulating w/ cane, but states his gait has worsened lately and he has considered using a walker at times. After initial injury, he states compression fracture was around 10% compressed, now he states its around 40% and he was told when it reaches 50%, he has to have surgery due to pressure on spinal cord. Next f/u w/ surgeon isn't till middle of next month. He also has osteoporosis and he attributes that to malnutrition from his bypass surgery which was many years ago. He has tried taking calcium/ D supplements and also tried a Rx medicine for osteoporosis, but per his recent Dexa scans, he states his numbers didn't change much. He is going to follow up with gastro doctor next month sometime. He is wearing a TLSO and was told to wear it as much as he can. He was wearing a lumbar support after surgery, but now has this TLSO. He walks w/ a cane. At his last f/u w/ surgeon, he had just started to have severe pain and also loss of bladder control; per his report this has worsened a great deal since that time. He has tried to contact their office, but only got thru to his . *RLE pain: >75% of the day. Relieved only partially w/ pain meds. Worse in the morning; sleeps in his TLSO due to following instructions to wear it as much as possible. 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       *future: discuss diet (vegan?)  *PT recommended f/u w/ PCP re: bone density and cont'd worsening bladder dysfunction/ UE/LE strength deficits    *primary problems:  - new increased RLE pain (intermittent, but lasts for hours at a time)  - new numbness along L4/5 dermatome    *Prior function:   - walk dogs 2 miles, 2x/day   - ride 50cc scooter  - housework: 1/2 day at a time  - able to carry musician equipment  - wood working    *Current function:   - ~15min of housework,   - son needs to carry musician equipment (40lbs) (last able to do this 1+ yrs ago)     Imaging:  Impression       1. Mild nonspecific marrow edema involving the base of the T7 spinous process. No correlate is seen on CT chest dated 3/21/2020. Consider CT thoracic spine to exclude a nondisplaced fracture. 2.   Chronic T5, T8, L1 compression fractures. 3.  Thoracic spondylosis resulting in mild T7-8 and T9-10 spinal canal stenoses and mild left T6-7 and T7-8 neural foraminal narrowing. *L1 comp fx was 75% ht loss in Sept 2021  *40% in Jan 2022  *40%  in Sept 2022    Hx: CVA in 2014: L side effected; moderate deficits at that time. SUBJECTIVE:    10/27: saw PCP earlier and feels a lot better about the direction he is headed. We did review his symptoms quite a bit and his biggest concern is new RLE pain and weakness. He is used to the chronic back pain. 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 Typically of late, he has only paid for it w/ pain/ fatigue x 1 day, not 2. He states he feels extremely tired today. After getting ready/ showered, he felt like he could go back to sleep for a long while. States his RLE pain/ numbness actually hasn't been as bad the past few days. Mostly biggest problems of significant low and mid back pain. 11/3: Has been sore since doing the prone and Qped exercises prior session. Was wondering if wearing his back brace during activities may have attributed to it as he will sometimes sleep on his stomach. Patient reports that he has increased his walking at home. F/U with surgeon at the end of the month and f/u with urologist next week. 11/7: RLE pain about the same, but moving better. He has already walked about 1/2 to 1 mile this AM walking dogs. He also reports gait stability feels better. 11/10: 15 min late - Came from appt at [facility]. Did okay after last session. A bit worn out today. Has already walked with the dogs  11/14: pt states numbness isn't as bad, weakness in RLE still present, mostly at end of the day. Started new med from urologist, and having less accidents at night also. No change in amount of back pain, but he notices he is more aware of posture and spends less time in slouched posture. 11/17: pt reports overall, not a great deal of change with back or RLE pain/ numbness/ weakness. He does feel generally stronger and able to sit up straighter and walk a little further. Periodic numbness about 1/2 the day, RLE pain is nearly constant at 6/10. F/u w/ spine surgeon next week. He recently found out that he will need another stomach surgery due to hernia in early Dec.   11/21: pt report doing about the same, still same RLE pain and back pain. He is to f/u w/ surgeon later today to discuss possible epidurals or other options. 11-23-22  Saw Dr. Bo Suárez re: osteopenia. With insurance approval to start [place]. To put in for [facility] insurance authorization. Reports walking w/o AD. To have hernia Sx 12-2-22. OBJECTIVE:   Observation:   Test measurements:    11/17: lumbar flex: WFL, RLE radic sx reproduced w/ R lateral flex. Standing posture reveals slight lumbar curvature with concavity to R. Possible leg length difference approx 1/4 to 1/2\" with R longer, but trial of heel lift did not help and made him feel more uneven.   ROM 10/24 11/17 11/21                                                     Strength         Hip flex         Knee ext  R=4/5  L=5/5       Knee flex  R=4/5  L=5/5       Ankle DF/ Ev  R=4+/5  L=5/5                         TESTS/ OTHER         SLR  R: pos @ 20*       Crossed SLR  L pos @ 45* w/ R sided LBP       30s STS Unable to perform  8x      TUG 21sec w/ cane  8.3sec, no AD                   RESTRICTIONS/PRECAUTIONS: L1 kyphoplasty, T5 & T8 comp fx, chronic pain, worsening gait/ UE/LE weakness    Exercises/Interventions:     Therapeutic Ex (68302)   Min: Resistance/Reps Notes/Cues  Ex with brace on   Warm up/ cardio Resume prn   squats Chair ht: 2x10    SL clamshell 2x10  R/L    Hookly bridge 2x10 ROM per christiano        ST hip abd    EOB hip ext X10 R/L         Step up 8\" 2x10 R,L W/ balance point, 1 hand assist   Positional gapping    scaption ST 2x10 bilat, 2# 0-120*+ per christiano   push up @ table 2x10    Seated row Black  TB  2 x 10 B    B UE ext Grn TB 2x10 B    Lateral walk outs Grn TB x 6 ea R/L 1 handle   3 steps        Pt edu Reviewed home program, cont to attempt to increase activity tolerance as long as not increasing leg pain. Therapeutic Activity (29018) Min:      Pt edu    Standing directional preference stretch Mild to mod centralization of radic sx. NMR re-education (83708)   Min:     gait Adjusted cane for proper height. Quad with purple gym ball under abdomen  Alt LE ext 2x8 ea  R/L   Alt UE raise x 8 ea R/L              Manual Intervention (48911) Min:      Lumbar manual         Modalities  Min:             Other Therapeutic Activities:  Pt was educated on PT POC, Diagnosis, Prognosis, pathomechanics as well as frequency and duration of scheduling future physical therapy appointments. Time was also taken on this day to answer all patient questions and participation in PT. Reviewed appointment policy in detail with patient and patient verbalized understanding. Home Exercise Program: Patient instructed in the following for HEP:      10/27: hookly bridge, mini squats, walking program  11/7: mini squat, wall push up, seated TB row, seated scaption (1#), walking program.  11/17: add sidely lumbar gapping over towel roll: 3-5min, 3-5x/day as needed to centralize sx.   11-23-22 ex with brace on       .  Patient verbalized/demonstrated understanding and was issued written handout. Therapeutic Exercise and NMR EXR  [x] (72817) Provided verbal/tactile cueing for activities related to strengthening, flexibility, endurance, ROM  for improvements in proximal hip and core control with self care, mobility, lifting and ambulation. [x] (36245) Provided verbal/tactile cueing for activities related to improving balance, coordination, kinesthetic sense, posture, motor skill, proprioception  to assist with core control in self care, mobility, lifting, and ambulation. Therapeutic Activities:    [x] (28220 or 61376) Provided verbal/tactile cueing for activities related to improving balance, coordination, kinesthetic sense, posture, motor skill, proprioception and motor activation to allow for proper function  with self care and ADLs  [x] (49009) Provided training and instruction to the patient for proper core and proximal hip recruitment and positioning with ambulation re-education     Home Exercise Program:    [x] (02427) Reviewed/Progressed HEP activities related to strengthening, flexibility, endurance, ROM of core, proximal hip and LE for functional self-care, mobility, lifting and ambulation   [x] (30081) Reviewed/Progressed HEP activities related to improving balance, coordination, kinesthetic sense, posture, motor skill, proprioception of core, proximal hip and LE for self care, mobility, lifting, and ambulation      Manual Treatments:  PROM / STM / Oscillations-Mobs:  G-I, II, III, IV (PA's, Inf., Post.)  [x] (50637) Provided manual therapy to mobilize proximal hip and LS spine soft tissue/joints for the purpose of modulating pain, promoting relaxation,  increasing ROM, reducing/eliminating soft tissue swelling/inflammation/restriction, improving soft tissue extensibility and allowing for proper ROM for normal function with self care, mobility, lifting and ambulation.        Approval Dates:  CPT Code Units Approved Units Used  Date Updated: Charges:  Timed Code Treatment Minutes: 43   Total Treatment Minutes: 43     [] EVAL (LOW) 52571 (typically 20 minutes face-to-face)  [] EVAL (MOD) 30659 (typically 30 minutes face-to-face)  [] EVAL (HIGH) 23998 (typically 45 minutes face-to-face)  [] RE-EVAL     [x] VR(05097) x   3  [] Dry needle 1 or 2 Muscles (38101)  [] NMR (76943) x     [] Dry needle 3+ Muscles (35029)  [] Manual (23719) x     [] Ultrasound (59280) x  [] TA (59532) x     [] Mech Traction (73749)  [] ES(attended) (42587)     [] ES (un) (43834):   [] Vasopump (78388) [] Ionto (07945)   [] Other:    Paola Miller stated goal: able to stand/ walk and prepare a meal for family. [x] Progressing: [] Met: [] Not Met: [] Adjusted  Therapist goals for Patient:   Short Term Goals: To be achieved in: 2 weeks  1. Independent in HEP and progression per patient tolerance, in order to prevent re-injury. [x] Progressing: [] Met: [] Not Met: [] Adjusted  2. Patient will have a decrease in pain to facilitate improvement in movement, function, and ADLs as indicated by Functional Deficits. [x] Progressing: [] Met: [] Not Met: [] Adjusted    Long Term Goals: To be achieved in: 6-10 weeks  1. Increase FOTO functional outcome score from 24 to 42 to assist with reaching prior level of function. [x] Progressing: [x] Met: [] Not Met: [] Adjusted  2. Patient will demonstrate increased AROM to WNL, good LS mobility, good hip ROM to allow for proper joint functioning as indicated by patients Functional Deficits. [x] Progressing: [] Met: [] Not Met: [] Adjusted  3. Patient will demonstrate an increase in Strength of lower quarter to be able to rise from chair at least 3x in 30sec. [x] Progressing: [] Met: [] Not Met: [] Adjusted  4. Patient will be able to reach arms overhead to at least 130* bilat to get closer to functional mobility. [x] Progressing: [] Met: [] Not Met: [] Adjusted  5.  Pt will have no episodes of loss of bladder control over a 2 week period. [x] Progressing: [] Met: [] Not Met: [] Adjusted     ASSESSMENT:  Pt needs skilled PT to restore gross upper/ lower quarter functional strength and promote increased activity tolerance. Jennifer session well. No sig incr in leg pain today, mild to mod global body/ lower quarter fatigue. 11-23-22 tolerated ex today well w/o complaint. Treatment/Activity Tolerance:  [] Patient tolerated treatment well [x] Patient limited by fatique  [x] Patient limited by pain  [] Patient limited by other medical complications  [] Other:     Overall Progression Towards Functional goals/ Treatment Progress Update:  [] Patient is progressing as expected towards functional goals listed. [] Progression is slowed due to complexities/Impairments listed. [] Progression has been slowed due to co-morbidities. [x] Plan just implemented, too soon to assess goals progression <30days   [] Goals require adjustment due to lack of progress  [] Patient is not progressing as expected and requires additional follow up with physician  [] Other:    Prognosis for POC: [x] Good [] Fair  [] Poor    Patient requires continued skilled intervention: [x] Yes  [] No        PLAN: See eval  [x] Continue per plan of care [] Alter current plan (see comments)  [] Plan of care initiated [] Hold pending MD visit [] Discharge    Electronically signed by: Adriana Velazco, PTA  [id number]          Note: If patient does not return for scheduled/recommended follow up visits, this note will serve as a discharge from care along with the most recent update on progress.

## 2022-11-23 NOTE — PROGRESS NOTES
Nurse explained the effects of smoking, can cause cancer in any part of the body, COPD.   Material given to the patient and also advised that if he decides to stop smoking he can also contact his PCP

## 2022-11-23 NOTE — PROGRESS NOTES
[facility] ([facility]) Osteoporosis and [address] [facility], [address]  Phone [phone]  Fax [phone]    NAME:  Kusum Jaramillo  :  1970  CONSULT DATE:    2022  MOST RECENT VISIT:  2022  TODAY'S DATE:  2022    Labs @ [facility] 2022    CONSULTATION REQUESTED BY: Dorinda Eaotn MD    PROBLEMS. Low bone density by DXA 2021, T-scores -2.1 in the spine (L2-L4), -2.4 in the right femoral neck    Family history of osteoporosis, mother    , age 52, wrist fracture (fell)    , age 49, L1* fx and ribs (fell)    , age 46, T5 T8 fx (no precipitating cause - may be old and incidental)    2022 MR old T5, T8, L1 40%    1.5height loss  Gastric bypass , weight 440# => 202#  GERD, Vaughn esophagus  Current smoker  Stroke   Kidney cancer , left nephrectomy    CURRENT MANAGEMENT FOR BONE HEALTH/OSTEOPOROSIS. Calcium, 300 mg from low calcium foods, 300 mg yogurt, 300 mg cheese, 200 mg dk green vegs   diet MVI Ca+D other    Calcium 3088 924 2535  mg/d   Vitamin D  800 1600  IU/d     25-OH D 52 ng/mL 2021 (desirable is 30-60 ng/mL)  Exercise, nothing regular  Pharmacologic therapy: Reclast 5 mg IV 2022    PREVIOUS BONE-ACTIVE MEDICATIONS. none    OTHER CURRENT MEDICATIONS (SELECTED): atorvastatin, pantoprazole, trazodone, fluoxetine, clopidogrel, gabapentin  OTC MEDICATIONS (SELECTED): none    CHIEF COMPLAINT. Compression fractures in vertebrae    HISTORY OF PRESENT ILLNESS: See problem list for chronic/inactive conditions. Mr. Solomon Aschoff is a 58-year-old man who was found to have an L1 fracture 2021, likely following a fall. He was found to have low bone density by DXA in 2021.  Dr. Varsha Atkinson at [facility] has proposed spine fusion to correct his spinal alignment but feels low bone density needs to be addressed first.     FOR FULL DETAILS OF FAMILY HISTORY, PAST MEDICAL AND SURGICAL HISTORY, SOCIAL HISTORY, AND REVIEW OF SYSTEMS, SEE PATIENT QUESTIONNAIRE OF TODAY'S DATE. FAMILY HISTORY. Relevant hx in problem list and/or HPI. Otherwise not contributory. PAST MEDICAL HISTORY. Noted in health history form. PAST SURGICAL HISTORY. Noted in health history form. SOCIAL HISTORY. Nonsmoker. No excessive intake of alcohol, caffeine or sodas. Lives with his spouse and family. REVIEW OF SYSTEMS. Maximum adult height 72. No significant height loss. Usual weight 205#. No recent significant change in weight. PHYSICAL EXAMINATION. GENERAL. Well-nourished, well-developed, normally proportioned adult. MENTAL STATUS. Pleasant mood. Oriented to time, place, and person. ORAL. Full upper and lower dentures. MUSCULOSKELETAL. The examination included inspection/palpation (any misalignment, asymmetry, crepitation, defects, tenderness, masses, effusions is noted), assessment of range of motion (any presence of pain, crepitation, contracture is noted), assessment of stability (any dislocation, subluxation, laxity is noted), assessment of muscle strength and tone (any atrophy or abnormal movements is noted). Pelvis appears normal.  Wearing a back brace. Spinal contours are normal.  No spine tenderness to palpation or percussion. Two finger spaces between ribs and pelvis. Gait slow. NEUROLOGICAL. Not able to rise from chair without using arms. No obvious motor or sensory deficit. Coordination appears normal     BONE DENSITY. Most recent done here using [employer] equipment. Prior done at [facility]. I deleted L1 due to compression fracture and bone cement. T-scores   Initial study: 11/03/2021 L2-L4 -2.2 right fem. neck -2.4   Current study: 10/05/2022 L2-L4 -2.1 right fem. neck -2.5     The table below shows bone mineral density (grams/cm2), the appropriate measure for comparing serial scans.  A significant increase or decrease is based on precision studies done at our center according to the ISCD protocol with a least significant change of 0.030 g/cm2. PA spine Proximal Femur (right)   Date L2-L4  Fem. neck Trochanter Total hip   11/03/2021 0.874 0.597 0.490 0.692   10/05/2022 0.881 0.591 0.496 0.699     BMD is low and did not change significantly [id number] in the spine or right hip. Labs: 09/2022 Ca 9.3 Cr 1.0. Imaging: DXA printouts reviewed. ASSESSMENT. Osteoporosis, bone density lower than desirable, with multiple vertebral fractures. Gastric bypass is a likely contributing factor. Without effective therapy, risk of fracture is high. With treatment, spine instrumentation should be OK. However, smoking increases the risk of failure and complications. DIAGNOSTIC PLANS. Blood tests: BAP, P1NP, PTH 25-OH vitamin D. In 2 weeks (or longer), on appropriate calcium intake,  24-hour urine for calcium, creatinine and sodium. I will be in touch with the patient to review lab results. THERAPEUTIC PLANS  Strongly advised to stop smoking. Contact information given for a smoking cessation counselor. Calcium, target 1000 mg daily; we discussed recommended calcium intake and the effects of excessive calcium intake from supplements. I provided a handout with information about how to calculate daily calcium intake. Advised to stop calcium supplements for now. Vitamin D, current vitamin D intake is fine for now. Exercise, Recommended weight-bearing exercise (walking or equivalent) 30-40 minutes per session, 3 or 4 sessions a week. Advised to take care to avoid injury (high impact, falling, etc). We discussed avoiding activities that place compressive forces on the spine; specifically pushing, pulling, bending, lifting and twisting to help prevent vertebral fractures and reviewed exercises to improve posture and balance. current exercise program is fine. Pharmacologic therapy, We discussed teriparatide (Forteo) for pharmacologic therapy due to very low BMD and history of fractures.  I explained dosing schedule, side effects and adverse effects associated with Forteo (osteosarcoma in rats). I provided educational material and did a brief demonstration with our Forteo practice pen. We will submit the order to the patient's insurance and will contact the patient about any out-of-pocket costs. Return appointment after 2-3 months of Forteo Total time on the date the encounter was 75-89 minutes. Claude Mile Watts MD, Director, [facility]) Osteoporosis and Bone Health Services    CC: Machelle Simmons MD

## 2022-11-25 LAB — ALK PHOS BONE SPECIFIC: 10.6 UG/L (ref 6.5–20.1)

## 2022-11-27 LAB — PROCOLLAGEN I INTACT N-TERM PROPEP: 37 UG/L (ref 22–105)

## 2022-11-27 NOTE — PROGRESS NOTES
Cardiac Electrophysiology Consultation   Date: 11/28/2022  Reason for Consultation: Syncope  Consult Requesting Physician: Estela Charles MD.  Primary Care Physician: Jay Suarez MD    Chief Complaint:   Chief Complaint   Patient presents with    Follow-up     HPI: Blas Shaw is a 47 y.o. patient with a history of depression, renal cell carcinoma (s/p nephrectomy 2018), splenic abscess (s/p splenectomy), OSCAR, morbid obesity (s/p gastric bypass 2009), multiple SBOs, intestinal adhesions, and GERD. He presented to the ER 3/2022 with complaints of 10/10 epigastric abdominal pain and intractable nausea and vomiting. He has had multiple SBOs in the past r/t adhesions from a hx of gastric bypass surgery, and he states his pain and symptoms are similar to past episodes. CT abdomen/pelvis is unrevealing of acute process. The patient's ECG while in the ER displayed bradycardia with bigeminal PACs. The patient has a known history of bradycardia and syncope and subsequently had an ILR placed in October of 2021 by Dr. Doyle Jean-Baptiste for clinical correlation. There have been no reported episodes of bradycardia or syncope since implantation. Patient developed infection in are of ILR implantation and on 11/8/2022 had the ILR removed by Florecita Hill MD.     Interval History: Today, he presents to office for follow up s/p ILR explant. He states that 3 weeks prior to loop explant he noted having swelling in the area. He was seen in the device clinic by LILLIE Ye CNP on 10/12/2022 and no drainage was noted. He was back in on 11/7/2022 and the area was draining yellow pus and site was swollen and red. He denies experiencing any syncopal episodes since the ILR was implanted. He states he will be undergoing hernia repair in the near future. Reports compliance with medications and tolerating them well.  Denies chest pain/pressure, tightness, edema, shortness of breath, heart racing, palpitations, lightheadedness, dizziness, syncope, presyncope,  PND or orthopnea. Past Medical History:   Diagnosis Date    Alcoholism ([address]     last drink 2015    Allergic rhinitis, seasonal     Anemia     Arthritis     right knee    Vaughn's esophagus     Benign intracranial hypertension     Cancer (HCC)     left kidney    Carpal tunnel syndrome     Chronic pain     Depression     Ear infection 05/06/2022    GERD (gastroesophageal reflux disease)     Headache(784.0)     History of blood transfusion     2018    History of loop recorder 2022    placed in 2021 and removed in 2022 d/t infection    History of spinal fracture 2021    L1,L7,L8 fall T5,T8  - unknown 2022    History of tobacco use     Quit 7/2014    Hyperlipidemia     Left wrist fracture 2020    fall    Migraine     chronic for 1 year    Morbid obesity ([facility] [address]     Movement disorder     Onychomycosis     Sleep apnea, obstructive     Severe uses cpap stop bang 6    Unspecified cerebral artery occlusion with cerebral infarction 2014    slight weakness in left arm    Wears dentures     full set    Wears glasses         Past Surgical History:   Procedure Laterality Date    ABDOMEN SURGERY      gastric bypass    ABDOMEN SURGERY  04/07/2016    repair of recurrent incisional hernia with mesh, removal of old mesh, bilateral component separation    ABDOMINAL EXPLORATION SURGERY  01/11/2016    exp lap, lysis of adhesions, small bowel resection    CARPAL TUNNEL RELEASE      bilat    COLONOSCOPY N/A 05/31/2022    COLONOSCOPY POLYPECTOMY SNARE/COLD BIOPSY performed by Pascual العلي MD at [facility], ESOPHAGUS      ENDOSCOPY, COLON, DIAGNOSTIC      EYE SURGERY Bilateral     Lasik    GASTRIC BYPASS SURGERY  01/2009    Has lost about 200 pounds.     HERNIA REPAIR  03/23/2016    ventral    KIDNEY REMOVAL Left 08/01/2018    KNEE ARTHROSCOPY Right 07/11/2013    Dr.Robert Sanders     KNEE ARTHROSCOPY Right 07/11/2012    RIGHT KNEE ARTHROSCOPY WITH CHONDROPLASTY KNEE SURGERY Right 02/18/2020    INCISION AND DRAINAGE RIGHT KNEE AND WOULD VAC PLACEMENT performed by Jarvis Nunes MD at [place] [address] Right 02/26/2021    INCISION AND DRAINAGE OF RIGHT KNEE HEMATOMA performed by Lizz Gill MD at [place] [address] Right 03/05/2021    INCISION AND DRAINAGE AND CLOSURE RIGHT TOTAL KNEE performed by Lizz Gill MD at [address] Left 03/08/2016    CT biopsy ablasion left kidney    OTHER SURGICAL HISTORY  2016    small intestine 12 inch removed, 2 hernia surgeries, another surgery to drain infection from incision. REVISION TOTAL KNEE ARTHROPLASTY Right 12/17/2019    RIGHT REVISION TIBIA TOTAL KNEE REPLACEMENT performed by Jarvis Nunes MD at [place] [address] Right 01/22/2020    RIGHT KNEE IRRIGATION AND DEBRIDEMENT WITH POLY EXCHANGE performed by Jaylon Urrutia MD at [place] [address] Right 02/16/2021    RIGHT REMOVAL OF EXPLANT AND TOTAL KNEE REPLACEMENT performed by Lizz Gill MD at [address] [place] [address]  10/15/2013    RIGHT    TOTAL KNEE ARTHROPLASTY Right 08/12/2020    RIGHT ARTHROPLASY  RESECTION WITH INSERTION OF SPACER performed by Jarvis Nunes MD at [address] [place] [address]  06/07/2016    UPPER GASTROINTESTINAL ENDOSCOPY  04/02/2018    UPPER GASTROINTESTINAL ENDOSCOPY N/A 03/24/2022    ESOPHAGOGASTRODUODENOSCOPY performed by Juan Antonio Saenz MD at [place] [address]  05/31/2022    ESOPHAGOGASTRODUODENOSCOPY WITH BIOPSY performed by Anyi Gregorio MD at [place] [address]:   Allergies   Allergen Reactions    Nsaids Nausea Only and Other (See Comments)     Hx of Barretts esophagus      Prochlorperazine Other (See Comments)     No allergic reaction, patient reported sense of \"restlessness\" and fidgiting    Valtrex [Valacyclovir Hcl] Diarrhea Fentanyl Itching    Morphine And Related Hives and Itching    Tolmetin Nausea Only     Hx of Barretts esophagus       Medication:   Prior to Admission medications    Medication Sig Start Date End Date Taking? Authorizing Provider   Teriparatide, Recombinant, (FORTEO) 600 MCG/2.4ML SOPN injection Inject 0.08 mLs into the skin daily OK to substitute teriparatide Alvogen () 11/23/22  Yes Rebbeca Claude, MD   Insulin Pen Needle (PEN NEEDLES 3/16\") 31G X 5 MM MISC Use one needle for each daily dose. 11/23/22  Yes Rebbeca Claude, MD   dicyclomine (BENTYL) 10 MG capsule Take 1 capsule by mouth every 6 hours as needed (cramps) 11/21/22  Yes Stella Langley MD   metoclopramide (REGLAN) 10 MG tablet Take 1 tablet by mouth 4 times daily WARNING:  May cause drowsiness. May impair ability to operate vehicles or machinery. Do not use in combination with alcohol. 11/21/22  Yes Stella Langley MD   lipase-protease-amylase (CREON) 51353-86956 units delayed release capsule Take 1 capsule by mouth 3 times daily (with meals) 11/10/22 12/10/22 Yes Hawk Franco MD   cyanocobalamin 1000 MCG/ML injection Inject into the muscle 4/8/21  Yes Historical Provider, MD   atorvastatin (LIPITOR) 40 MG tablet TAKE 1 TABLET BY MOUTH EVERYDAY AT BEDTIME 9/21/22  Yes Hawk Franco MD   pantoprazole (PROTONIX) 40 MG tablet TAKE 1 TABLET BY MOUTH TWICE A DAY 9/21/22  Yes Hawk Franco MD   traZODone (DESYREL) 100 MG tablet Take 2 tablets by mouth nightly 6/29/22  Yes Hawk Franco MD   FLUoxetine (PROZAC) 20 MG capsule Take 1 capsule by mouth daily 6/29/22  Yes Hawk Franco MD   clopidogrel (PLAVIX) 75 MG tablet TAKE 1 TABLET BY MOUTH EVERY DAY 4/13/22  Yes Lenny Aguila MD   ondansetron (ZOFRAN ODT) 4 MG disintegrating tablet Take 1 tablet by mouth every 8 hours as needed for Nausea 3/7/22  Yes Abigail Bonner MD   gabapentin (NEURONTIN) 600 MG tablet Take 600 mg by mouth 4 times daily.   8/2/21  Yes Historical Provider, MD   calcium-vitamin D (OSCAL-500) 500-200 MG-UNIT per tablet Take 1 tablet by mouth 2 times daily    Yes Historical Provider, MD   Multiple Vitamin TABS Take 1 tablet by mouth daily    Yes Historical Provider, MD   oxyCODONE (ROXICODONE) 5 MG immediate release tablet Take 1 tablet by mouth every 6 hours as needed for Pain for up to 30 days. Patient not taking: Reported on 11/28/2022 11/15/22 12/15/22  Kendra Charles MD   sildenafil (REVATIO) 20 MG tablet Take 4 tablets by mouth as needed (30 min prior to intercourse)  Patient not taking: Reported on 11/28/2022 1/11/21   Kendra Charles MD       Social History:   reports that he has been smoking cigarettes. He started smoking about 37 years ago. He has a 12.50 pack-year smoking history. He has never used smokeless tobacco. He reports that he does not currently use alcohol. He reports current drug use. Drug: Marijuana Estephania Mcginnis). Family History:  family history includes Arthritis in his mother; Cancer in his father; Diabetes in his maternal uncle; Heart Disease in his maternal uncle and maternal uncle; Osteoporosis in his mother. Reviewed. Denies family history of sudden cardiac death, arrhythmia, premature CAD    Review of System:    General ROS: negative for - chills, fever   Psychological ROS: negative for - anxiety or depression  Ophthalmic ROS: negative for - eye pain or loss of vision  ENT ROS: negative for - epistaxis, headaches, nasal discharge, sore throat   Allergy and Immunology ROS: negative for - hives, nasal congestion   Hematological and Lymphatic ROS: negative for - bleeding problems, blood clots, bruising or jaundice  Endocrine ROS: negative for - skin changes, temperature intolerance or unexpected weight changes  Respiratory ROS: negative for - cough, hemoptysis, pleuritic pain, SOB, sputum changes or wheezing  Cardiovascular ROS: Per HPI.    Gastrointestinal ROS: negative for - abdominal pain, blood in stools, diarrhea, hematemesis, melena, nausea/vomiting or swallowing difficulty/pain  Genito-Urinary ROS: negative for - dysuria or incontinence  Musculoskeletal ROS: negative for - joint swelling or muscle pain  Neurological ROS: negative for - confusion, dizziness, gait disturbance, headaches, numbness/tingling, seizures, speech problems, tremors, visual changes or weakness  Dermatological ROS: negative for - rash    Physical Examination:  Vitals:    11/28/22 1340   BP: 110/60   Pulse: 60   SpO2: 98%       Constitutional: Oriented. No distress. Head: Normocephalic and atraumatic. Mouth/Throat: Oropharynx is clear and moist.   Eyes: Conjunctivae normal. EOM are normal.   Neck: Normal range of motion. Neck supple. No rigidity. No JVD present. Cardiovascular: Normal rate, regular rhythm, S1&S2 and intact distal pulses. Pulmonary/Chest: Bilateral respiratory sounds. No wheezes. No rhonchi. Abdominal: Soft. Bowel sounds present. No distension, No tenderness. Musculoskeletal: No tenderness. No edema    Lymphadenopathy: Has no cervical adenopathy. Neurological: Alert and oriented. Cranial nerve appears intact, No Gross deficit   Skin: Skin is warm and dry. No rash noted. Psychiatric: Has a normal mood, affect and behavior     Labs:  Reviewed. ECG: Sinus rhythm with v-rate of 60 bpm with QRS duration 100 ms. No pathologic Q waves, ventricular pre-excitation, or QT prolongation. Studies:   1. Event monitor: n/a      2. Echo: 04/27/2022  Summary  Normal left ventricle size, wall thickness, and systolic function with an estimated ejection fraction of 55-60%. No regional wall motion abnormalities are seen. Normal diastolic function. The aortic root is normal in size. The ascending aorta is mildly dilated at 4.1 cms  The right ventricle is normal in size and function. 3. Stress Test:  n/a      4.  Cath: n/a    I independently reviewed and interpreted the ECG, MCOT, echocardiogram, stress test, and coronary angiography/PCI results and used them for my plan of care. Procedures:  1. Implantation of Medtronic Reveal LINQ [id number] SN# [id number] 10/12/2021    Assessment/Plan:     CVA  - Remote infarct involving the posterior right insular cortex and right parietal lobe. - No residual deficits reported. PFO  - s/p PFO closure 08/05/2021.   - Continue Plavix. - Following with Dolly Wooten MD for management. Syncope  - s/p implantation of Medtronic Reveal LINQ [id number] SN# [id number] 10/12/2021.  - ILR removed 11/8/2022 due to infection.  - Denies experience any syncopal episodes since the ILR was implanted. - Discussed reimplantation of ILR for continued monitoring for underlying arrhythmias which may have contributed to his syncopal episode. We discussed implanting an implantable loop recorder for long-term cardiac dysrhythmia monitoring in order to evaluate for arrhythmias which may have contributed to his syncopal episode. The benefits and risks of implanting an implantable loop recorder has been discussed with the patient. The risks including, but not limited to, the risks of vascular injury, bleeding, infection, device malfunction, injury to cardiac and surrounding structures (including pneumothorax), stroke, myocardial infarction and death were discussed in detail. The patient was also counseled at length about the risks of michelle Covid-19 in the nereyda-operative and post-operative states including the recovery window of their procedure. The patient was made aware that michelle Covid-19 after a surgical procedure may worsen their prognosis for recovering from the virus and lend to a higher morbidity and or mortality risk. The patient was given the option of postponing their procedure. The patient was also presented reasonable alternatives to the proposed care, treatment, and services.  The discussion I have had with the patient encompassed risks, benefits, and side effects related to the alternatives and the risks related to not receiving the proposed care, treatment and services. I spent 40 minutes face to face with the patient, with greater than 50% of that time spent in counseling on the above. The patient is aware of and understands the risks and is willing to proceed with the ILR implantation, but wishes to wait until after the first of the year. Tobacco dependence  - Encouraged cessation. Follow ups:  - Follow up 1 week after ILR implantation in the device clinic for site check and device interrogation. He will then follow up with EP services PRN. - Continue routine follow up with Aden Oscar MD.      Thank you for allowing me to participate in the care of Elgin Mujica. All questions and concerns were addressed to the patient/family. Alternatives to my treatment were discussed. This note was scribed in the presence of Dr. Sampson Jovel MD by Myriam Erickson RN. The scribe's documentation has been prepared under my direction and personally reviewed by me in its entirety. I confirm that the note above accurately reflects all work, physical examination, the discussion of treatments and procedures, and medical decision making performed by me.     Sampson Jovel MD, MS, [facility], [facility]  Cardiac Electrophysiology  [address]  [address] [place], [facility] [place] [address]  [phone]

## 2022-11-28 ENCOUNTER — OFFICE VISIT (OUTPATIENT)
Dept: CARDIOLOGY CLINIC | Age: 52
End: 2022-11-28
Payer: COMMERCIAL

## 2022-11-28 ENCOUNTER — APPOINTMENT (OUTPATIENT)
Dept: PHYSICAL THERAPY | Age: 52
End: 2022-11-28
Payer: COMMERCIAL

## 2022-11-28 VITALS
WEIGHT: 199 LBS | HEIGHT: 70 IN | SYSTOLIC BLOOD PRESSURE: 110 MMHG | BODY MASS INDEX: 28.49 KG/M2 | DIASTOLIC BLOOD PRESSURE: 60 MMHG | HEART RATE: 60 BPM | OXYGEN SATURATION: 98 %

## 2022-11-28 DIAGNOSIS — Z72.0 TOBACCO USE: ICD-10-CM

## 2022-11-28 DIAGNOSIS — R55 SYNCOPE, UNSPECIFIED SYNCOPE TYPE: ICD-10-CM

## 2022-11-28 DIAGNOSIS — Q21.12 PFO (PATENT FORAMEN OVALE): ICD-10-CM

## 2022-11-28 DIAGNOSIS — Z86.73 HISTORY OF CVA (CEREBROVASCULAR ACCIDENT): Primary | ICD-10-CM

## 2022-11-28 PROCEDURE — 99215 OFFICE O/P EST HI 40 MIN: CPT | Performed by: INTERNAL MEDICINE

## 2022-11-28 PROCEDURE — 93000 ELECTROCARDIOGRAM COMPLETE: CPT | Performed by: INTERNAL MEDICINE

## 2022-11-28 NOTE — PATIENT INSTRUCTIONS
If you have any questions regarding your loop   Loop Implant  Pre Procedure Instructions     Date: _______________________    Walsh Ee at: ____________________     Procedure time: __________________      The morning of your procedure you will park in the [facility] AT [facility] and report directly to the cath lab to check in. At the information desk stay right and go all the way to the end of the gabriel, this will take you directly to your check in desk for the cath lab. Pre-Procedure Instructions   Do not eat or drink anything for two hours prior to your procedure. Do not use any lotions, creams or perfume the morning of procedure. You will need to hold your Plavix for 5 days prior to the procedure. Cath lab will provide you with all post procedure instructions. Bring your liz[employer] store password (android or apple) when having a loop recorder insertion.

## 2022-11-29 ENCOUNTER — PATIENT MESSAGE (OUTPATIENT)
Dept: ENDOCRINOLOGY | Age: 52
End: 2022-11-29

## 2022-11-29 LAB
SEX HORMONE BINDING GLOBULIN: 41 NMOL/L (ref 11–80)
TESTOSTERONE FREE-NONMALE: 80.4 PG/ML (ref 47–244)
TESTOSTERONE TOTAL: 446 NG/DL (ref 220–1000)

## 2022-11-29 NOTE — TELEPHONE ENCOUNTER
From: Maxine Abernathy  To: Dr. Tobar Slider: 11/29/2022 9:34 AM EST  Subject: Ahsan Ortez, I noticed that [place] [facility] has the prescription for the Stradone Albaro Provolo 66 as well as the needles and the delivery device. They arent being filled, they just appear to be on file. Im unclear as to when it was I was to start using the Forteo. Am I to wait until after the 24 hour urine test, or is that only to check my calcium absorption? I didnt know if the two things were related, or when/if I can start using the Forteo.      Migel Faustin

## 2022-12-01 ENCOUNTER — HOSPITAL ENCOUNTER (OUTPATIENT)
Dept: PHYSICAL THERAPY | Age: 52
Setting detail: THERAPIES SERIES
Discharge: HOME | End: 2022-12-01
Payer: COMMERCIAL

## 2022-12-01 ENCOUNTER — ANESTHESIA EVENT (OUTPATIENT)
Dept: OPERATING ROOM | Age: 52
End: 2022-12-01
Payer: COMMERCIAL

## 2022-12-01 PROCEDURE — 97530 THERAPEUTIC ACTIVITIES: CPT

## 2022-12-01 PROCEDURE — 97110 THERAPEUTIC EXERCISES: CPT

## 2022-12-01 NOTE — PROGRESS NOTES
[place] [facility], [facility]  [address] [place], [facility]  Phone: [phone]   Fax:     [phone]    Physical Therapy Treatment Note/ Progress Report:     Date:  2022    Patient Name:  Сергей Sparks    :  1970  MRN: [id number]    Pertinent Medical History:      Medical/Treatment Diagnosis Information:  Medical Diagnosis: Compression fracture of L1 lumbar vertebra ([facility] [address] [Y48.088D]  Treatment Diagnosis: back pain, limited mobility, activity tolerance, gait dysfunction, bladder dysfunction    Insurance/Certification information:     Physician Information:  Shu Toro MD  Plan of care signed (Y/N):     Date of Patient follow up with Physician:      Progress Report: []  Yes  [x]  No     Date Range for reporting period:  Beginning: , 22  Ending:    Progress report due (10 Rx/or 30 days whichever is less): #, #91    Recertification due (POC duration/ or 90 days whichever is less):     Visit # POC/ Insurance Allowable Auth Needed    [facility] []Yes    []No     Functional Outcomes Measure:   Date Assessed: at eval  Test:FOTO (24-41)  Score:    : 44pts    Pain level: n/r /10     History of Injury: Pt here for evaluation for c/o back and R leg pain. He reports a long hx of a chronic pain, but about a year ago, he had a fall and he sustained compression fracture of L1 and had kyphoplasty around March. He wore a lumbar brace up until a few months ago. That seemed to help with pain for a month or two, but then things started to get bad. He now reports for the past 4-6 weeks that he had entire RLE pain numbness into his foot. He also reports along that same time frame that he has many instances at night where he loses control of his bladder, and also has difficulty raising arms overhead which apparently is somewhat new.  He has been very diligent about setting an alarm to go to the restroom every 2 hours to prevent accidents during the day. The past few weeks, he reports increased episodes of bed wetting at night. He last saw spine surgeon a few weeks ago regarding bed wetting and it was recommended to try epidural and possibly surgery. He apparently was denied epidurals and was told to try PT first by his insurance. He is ambulating w/ cane, but states his gait has worsened lately and he has considered using a walker at times. After initial injury, he states compression fracture was around 10% compressed, now he states its around 40% and he was told when it reaches 50%, he has to have surgery due to pressure on spinal cord. Next f/u w/ surgeon isn't till middle of next month. He also has osteoporosis and he attributes that to malnutrition from his bypass surgery which was many years ago. He has tried taking calcium/ D supplements and also tried a Rx medicine for osteoporosis, but per his recent Dexa scans, he states his numbers didn't change much. He is going to follow up with gastro doctor next month sometime. He is wearing a TLSO and was told to wear it as much as he can. He was wearing a lumbar support after surgery, but now has this TLSO. He walks w/ a cane. At his last f/u w/ surgeon, he had just started to have severe pain and also loss of bladder control; per his report this has worsened a great deal since that time. He has tried to contact their office, but only got thru to his . *RLE pain: >75% of the day. Relieved only partially w/ pain meds. Worse in the morning; sleeps in his TLSO due to following instructions to wear it as much as possible. 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       *future: discuss diet (vegan?)  *PT recommended f/u w/ PCP re: bone density and cont'd worsening bladder dysfunction/ UE/LE strength deficits    *primary problems:  - new increased RLE pain (intermittent, but lasts for hours at a time)  - new numbness along L4/5 dermatome    *Prior function:   - walk dogs 2 miles, 2x/day   - ride 50cc scooter  - housework: 1/2 day at a time  - able to carry musician equipment  - wood working    *Current function:   - ~15min of housework,   - son needs to carry musician equipment (40lbs) (last able to do this 1+ yrs ago)     Imaging:  Impression       1. Mild nonspecific marrow edema involving the base of the T7 spinous process. No correlate is seen on CT chest dated 3/21/2020. Consider CT thoracic spine to exclude a nondisplaced fracture. 2.   Chronic T5, T8, L1 compression fractures. 3.  Thoracic spondylosis resulting in mild T7-8 and T9-10 spinal canal stenoses and mild left T6-7 and T7-8 neural foraminal narrowing. *L1 comp fx was 75% ht loss in Sept 2021  *40% in Jan 2022  *40%  in Sept 2022    Hx: CVA in 2014: L side effected; moderate deficits at that time. SUBJECTIVE:    10/27: saw PCP earlier and feels a lot better about the direction he is headed. We did review his symptoms quite a bit and his biggest concern is new RLE pain and weakness. He is used to the chronic back pain. 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 Typically of late, he has only paid for it w/ pain/ fatigue x 1 day, not 2. He states he feels extremely tired today. After getting ready/ showered, he felt like he could go back to sleep for a long while. States his RLE pain/ numbness actually hasn't been as bad the past few days. Mostly biggest problems of significant low and mid back pain. 11/3: Has been sore since doing the prone and Qped exercises prior session. Was wondering if wearing his back brace during activities may have attributed to it as he will sometimes sleep on his stomach. Patient reports that he has increased his walking at home. F/U with surgeon at the end of the month and f/u with urologist next week.   11/7: RLE pain about the same, but moving better. He has already walked about 1/2 to 1 mile this AM walking dogs. He also reports gait stability feels better. 11/10: 15 min late - Came from appt at [facility]. Did okay after last session. A bit worn out today. Has already walked with the dogs  11/14: pt states numbness isn't as bad, weakness in RLE still present, mostly at end of the day. Started new med from urologist, and having less accidents at night also. No change in amount of back pain, but he notices he is more aware of posture and spends less time in slouched posture. 11/17: pt reports overall, not a great deal of change with back or RLE pain/ numbness/ weakness. He does feel generally stronger and able to sit up straighter and walk a little further. Periodic numbness about 1/2 the day, RLE pain is nearly constant at 6/10. F/u w/ spine surgeon next week. He recently found out that he will need another stomach surgery due to hernia in early Dec.   11/21: pt report doing about the same, still same RLE pain and back pain. He is to f/u w/ surgeon later today to discuss possible epidurals or other options. 11-23-22  Saw Dr. Aggie Browning re: osteopenia. With insurance approval to start [place]. To put in for [facility] insurance authorization. Reports walking w/o AD. To have hernia Sx 12-2-22.    12/1: He saw spine recently, and there was questions about his cont'd deficits w/ bladder control. Overall, he is some better and has improved activity tolerance, but still has fairly consistent back and leg pain and numbness. He apparently is awaiting some further testing and then will likely be getting an epidural. He has hernia repair surgery scheduled tomorrow and today states he isn't feeling well and didn't sleep hardly at all last night. OBJECTIVE:   Observation:   Test measurements:    11/17: lumbar flex: WFL, RLE radic sx reproduced w/ R lateral flex. Standing posture reveals slight lumbar curvature with concavity to R.  Possible leg length difference approx 1/4 to 1/2\" with R longer, but trial of heel lift did not help and made him feel more uneven. ROM 10/24 11/17 11/21 12/1                                                    Strength         Hip flex         Knee ext  R=4/5  L=5/5       Knee flex  R=4/5  L=5/5       Ankle DF/ Ev  R=4+/5  L=5/5                         TESTS/ OTHER         SLR  R: pos @ 20*       Crossed SLR  L pos @ 45* w/ R sided LBP       30s STS Unable to perform  8x 12x     TUG 21sec w/ cane  8.3sec, no AD                   RESTRICTIONS/PRECAUTIONS: L1 kyphoplasty, T5 & T8 comp fx, chronic pain, worsening gait/ UE/LE weakness    Exercises/Interventions:     Therapeutic Ex (44540)   Min: Resistance/Reps Notes/Cues  Ex with brace on   Warm up/ cardio Resume prn   squats    SL clamshell    Hookly bridge ROM per christiano       ST hip abd    EOB hip ext        Step up W/ balance point, 1 hand assist   Positional gapping    scaption 0-120*+ per christiano   push up    Seated row    B UE ext    Lateral walk outs 1 handle   3 steps        Pt edu Reviewed home program thoroughly with precautions  for upcoming abdominal surgery    Therapeutic Activity (74622) Min:      Pt edu Reviewed pathology, role of ongoing PT program for maximization of function/ activity tolerance, review FOTO results; short and long-term ongoing goals. Standing directional preference stretch Mild to mod centralization of radic sx. NMR re-education (54008)   Min:     gait Adjusted cane for proper height. Quad with purple gym ball under abdomen               Manual Intervention (29939) Min:      Lumbar manual         Modalities  Min:             Other Therapeutic Activities:  Pt was educated on PT POC, Diagnosis, Prognosis, pathomechanics as well as frequency and duration of scheduling future physical therapy appointments. Time was also taken on this day to answer all patient questions and participation in PT.  Reviewed appointment policy in detail with patient and patient verbalized understanding. Home Exercise Program: Patient instructed in the following for HEP:      10/27: hookly bridge, mini squats, walking program  11/7: mini squat, wall push up, seated TB row, seated scaption (1#), walking program.  11/17: add sidely lumbar gapping over towel roll: 3-5min, 3-5x/day as needed to centralize sx.   11-23-22 ex with brace on       . Patient verbalized/demonstrated understanding and was issued written handout. Therapeutic Exercise and NMR EXR  [x] (90542) Provided verbal/tactile cueing for activities related to strengthening, flexibility, endurance, ROM  for improvements in proximal hip and core control with self care, mobility, lifting and ambulation. [x] (75358) Provided verbal/tactile cueing for activities related to improving balance, coordination, kinesthetic sense, posture, motor skill, proprioception  to assist with core control in self care, mobility, lifting, and ambulation.      Therapeutic Activities:    [x] (74961 or 64327) Provided verbal/tactile cueing for activities related to improving balance, coordination, kinesthetic sense, posture, motor skill, proprioception and motor activation to allow for proper function  with self care and ADLs  [x] (36164) Provided training and instruction to the patient for proper core and proximal hip recruitment and positioning with ambulation re-education     Home Exercise Program:    [x] (26692) Reviewed/Progressed HEP activities related to strengthening, flexibility, endurance, ROM of core, proximal hip and LE for functional self-care, mobility, lifting and ambulation   [x] (48190) Reviewed/Progressed HEP activities related to improving balance, coordination, kinesthetic sense, posture, motor skill, proprioception of core, proximal hip and LE for self care, mobility, lifting, and ambulation      Manual Treatments:  PROM / STM / Oscillations-Mobs:  G-I, II, III, IV (PA's, Inf., Post.)  [x] (77979) Provided manual therapy to mobilize proximal hip and LS spine soft tissue/joints for the purpose of modulating pain, promoting relaxation,  increasing ROM, reducing/eliminating soft tissue swelling/inflammation/restriction, improving soft tissue extensibility and allowing for proper ROM for normal function with self care, mobility, lifting and ambulation. Approval Dates:  CPT Code Units Approved Units Used  Date Updated:                     Charges:  Timed Code Treatment Minutes: 30   Total Treatment Minutes: 30     [] EVAL (LOW) 67360 (typically 20 minutes face-to-face)  [] EVAL (MOD) 30185 (typically 30 minutes face-to-face)  [] EVAL (HIGH) 59583 (typically 45 minutes face-to-face)  [] RE-EVAL     [x] XT(69342) x     [] Dry needle 1 or 2 Muscles (93250)  [] NMR (85207) x     [] Dry needle 3+ Muscles (50173)  [] Manual (74448) x     [] Ultrasound (18351) x  [x] TA (34366) x     [] Mech Traction (38566)  [] ES(attended) (06124)     [] ES (un) (93509):   [] Vasopump (26567) [] Ionto (87489)   [] Other:    Richardnia Angela stated goal: able to stand/ walk and prepare a meal for family. [x] Progressing: [x] Met: [] Not Met: [] Adjusted  Therapist goals for Patient:   Short Term Goals: To be achieved in: 2 weeks  1. Independent in HEP and progression per patient tolerance, in order to prevent re-injury. [x] Progressing: [x] Met: [] Not Met: [] Adjusted  2. Patient will have a decrease in pain to facilitate improvement in movement, function, and ADLs as indicated by Functional Deficits. [x] Progressing: [x] Met: [] Not Met: [] Adjusted    Long Term Goals: To be achieved in: 6-10 weeks  1. Increase FOTO functional outcome score from 24 to 42 to assist with reaching prior level of function. [x] Progressing: [x] Met: [] Not Met: [] Adjusted  2. Patient will demonstrate increased AROM to WNL, good LS mobility, good hip ROM to allow for proper joint functioning as indicated by patients Functional Deficits.    [x] Progressing: [x] Met: [] Not Met: [] Adjusted  3. Patient will demonstrate an increase in Strength of lower quarter to be able to rise from chair at least 3x in 30sec. [x] Progressing: [x] Met: [] Not Met: [] Adjusted  4. Patient will be able to reach arms overhead to at least 130* bilat to get closer to functional mobility. [x] Progressing: [x] Met: [] Not Met: [] Adjusted  5. Pt will have no episodes of loss of bladder control over a 2 week period. [x] Progressing: [] Met: [x] Not Met: [] Adjusted     ASSESSMENT:  Pt needs skilled PT to restore gross upper/ lower quarter functional strength and promote increased activity tolerance. Held most ex's today as pt not feeling well and upcoming surgery tomorrow. He understands his home program fully and has been educated to follow surgeons precautions after his hernia repair tomorrow. Treatment/Activity Tolerance:  [] Patient tolerated treatment well [x] Patient limited by fatique  [x] Patient limited by pain  [] Patient limited by other medical complications  [] Other:     Overall Progression Towards Functional goals/ Treatment Progress Update:  [] Patient is progressing as expected towards functional goals listed. [] Progression is slowed due to complexities/Impairments listed. [] Progression has been slowed due to co-morbidities. [x] Plan just implemented, too soon to assess goals progression <30days   [] Goals require adjustment due to lack of progress  [] Patient is not progressing as expected and requires additional follow up with physician  [] Other:    Prognosis for POC: [x] Good [] Fair  [] Poor    Patient requires continued skilled intervention: [x] Yes  [] No        PLAN: See eval  12/1: hold PT until further notice from general surgeon and back surgeon. [x] Continue per plan of care [] Alter current plan (see comments)  [] Plan of care initiated [] Hold pending MD visit [] Discharge    Electronically signed by:  Farris Boxer, PT  , DPT  #[id number]            Note: If patient does not return for scheduled/recommended follow up visits, this note will serve as a discharge from care along with the most recent update on progress.

## 2022-12-02 ENCOUNTER — ANESTHESIA (OUTPATIENT)
Dept: OPERATING ROOM | Age: 52
End: 2022-12-02
Payer: COMMERCIAL

## 2022-12-02 ENCOUNTER — HOSPITAL ENCOUNTER (OUTPATIENT)
Age: 52
Setting detail: OUTPATIENT SURGERY
Discharge: HOME | End: 2022-12-02
Attending: SURGERY | Admitting: SURGERY
Payer: COMMERCIAL

## 2022-12-02 VITALS
OXYGEN SATURATION: 99 % | DIASTOLIC BLOOD PRESSURE: 47 MMHG | HEART RATE: 59 BPM | TEMPERATURE: 97 F | BODY MASS INDEX: 28.2 KG/M2 | WEIGHT: 197 LBS | HEIGHT: 70 IN | RESPIRATION RATE: 16 BRPM | SYSTOLIC BLOOD PRESSURE: 131 MMHG

## 2022-12-02 DIAGNOSIS — K43.2 RECURRENT INCISIONAL HERNIA: ICD-10-CM

## 2022-12-02 PROCEDURE — 2580000003 HC RX 258: Performed by: SURGERY

## 2022-12-02 PROCEDURE — 7100000010 HC PHASE II RECOVERY - FIRST 15 MIN: Performed by: SURGERY

## 2022-12-02 PROCEDURE — 3700000000 HC ANESTHESIA ATTENDED CARE: Performed by: SURGERY

## 2022-12-02 PROCEDURE — A4217 STERILE WATER/SALINE, 500 ML: HCPCS | Performed by: SURGERY

## 2022-12-02 PROCEDURE — 3600000013 HC SURGERY LEVEL 3 ADDTL 15MIN: Performed by: SURGERY

## 2022-12-02 PROCEDURE — 6360000002 HC RX W HCPCS: Performed by: SURGERY

## 2022-12-02 PROCEDURE — 2580000003 HC RX 258: Performed by: ANESTHESIOLOGY

## 2022-12-02 PROCEDURE — 6370000000 HC RX 637 (ALT 250 FOR IP): Performed by: ANESTHESIOLOGY

## 2022-12-02 PROCEDURE — 6360000002 HC RX W HCPCS: Performed by: ANESTHESIOLOGY

## 2022-12-02 PROCEDURE — 2500000003 HC RX 250 WO HCPCS: Performed by: NURSE ANESTHETIST, CERTIFIED REGISTERED

## 2022-12-02 PROCEDURE — 88307 TISSUE EXAM BY PATHOLOGIST: CPT

## 2022-12-02 PROCEDURE — 7100000011 HC PHASE II RECOVERY - ADDTL 15 MIN: Performed by: SURGERY

## 2022-12-02 PROCEDURE — 87186 SC STD MICRODIL/AGAR DIL: CPT

## 2022-12-02 PROCEDURE — 7100000001 HC PACU RECOVERY - ADDTL 15 MIN: Performed by: SURGERY

## 2022-12-02 PROCEDURE — 2500000003 HC RX 250 WO HCPCS: Performed by: SURGERY

## 2022-12-02 PROCEDURE — 6360000002 HC RX W HCPCS: Performed by: NURSE ANESTHETIST, CERTIFIED REGISTERED

## 2022-12-02 PROCEDURE — 87077 CULTURE AEROBIC IDENTIFY: CPT

## 2022-12-02 PROCEDURE — 87075 CULTR BACTERIA EXCEPT BLOOD: CPT

## 2022-12-02 PROCEDURE — 7100000000 HC PACU RECOVERY - FIRST 15 MIN: Performed by: SURGERY

## 2022-12-02 PROCEDURE — 49203 PR EXCISION/DESTRUCTION OPEN ABDOMINAL TUMORS 5 CM: CPT | Performed by: SURGERY

## 2022-12-02 PROCEDURE — 3700000001 HC ADD 15 MINUTES (ANESTHESIA): Performed by: SURGERY

## 2022-12-02 PROCEDURE — 3600000003 HC SURGERY LEVEL 3 BASE: Performed by: SURGERY

## 2022-12-02 PROCEDURE — 87205 SMEAR GRAM STAIN: CPT

## 2022-12-02 PROCEDURE — 2709999900 HC NON-CHARGEABLE SUPPLY: Performed by: SURGERY

## 2022-12-02 PROCEDURE — 87070 CULTURE OTHR SPECIMN AEROBIC: CPT

## 2022-12-02 RX ORDER — DOXYCYCLINE HYCLATE 100 MG
100 TABLET ORAL 2 TIMES DAILY
Qty: 14 TABLET | Refills: 0 | Status: SHIPPED | OUTPATIENT
Start: 2022-12-02 | End: 2022-12-09

## 2022-12-02 RX ORDER — PROPOFOL 10 MG/ML
INJECTION, EMULSION INTRAVENOUS CONTINUOUS PRN
Status: DISCONTINUED | OUTPATIENT
Start: 2022-12-02 | End: 2022-12-02 | Stop reason: SDUPTHER

## 2022-12-02 RX ORDER — BUPIVACAINE HYDROCHLORIDE 5 MG/ML
INJECTION, SOLUTION EPIDURAL; INTRACAUDAL
Status: COMPLETED | OUTPATIENT
Start: 2022-12-02 | End: 2022-12-02

## 2022-12-02 RX ORDER — LIDOCAINE HYDROCHLORIDE 20 MG/ML
INJECTION, SOLUTION EPIDURAL; INFILTRATION; INTRACAUDAL; PERINEURAL PRN
Status: DISCONTINUED | OUTPATIENT
Start: 2022-12-02 | End: 2022-12-02 | Stop reason: SDUPTHER

## 2022-12-02 RX ORDER — KETAMINE HCL IN NACL, ISO-OSM 100MG/10ML
SYRINGE (ML) INJECTION PRN
Status: DISCONTINUED | OUTPATIENT
Start: 2022-12-02 | End: 2022-12-02 | Stop reason: SDUPTHER

## 2022-12-02 RX ORDER — OXYCODONE HYDROCHLORIDE 10 MG/1
10 TABLET ORAL PRN
Status: COMPLETED | OUTPATIENT
Start: 2022-12-02 | End: 2022-12-02

## 2022-12-02 RX ORDER — SODIUM CHLORIDE 0.9 % (FLUSH) 0.9 %
5-40 SYRINGE (ML) INJECTION EVERY 12 HOURS SCHEDULED
Status: DISCONTINUED | OUTPATIENT
Start: 2022-12-02 | End: 2022-12-02 | Stop reason: HOSPADM

## 2022-12-02 RX ORDER — SODIUM CHLORIDE 9 MG/ML
INJECTION, SOLUTION INTRAVENOUS PRN
Status: DISCONTINUED | OUTPATIENT
Start: 2022-12-02 | End: 2022-12-02 | Stop reason: HOSPADM

## 2022-12-02 RX ORDER — SODIUM CHLORIDE 0.9 % (FLUSH) 0.9 %
5-40 SYRINGE (ML) INJECTION PRN
Status: DISCONTINUED | OUTPATIENT
Start: 2022-12-02 | End: 2022-12-02 | Stop reason: HOSPADM

## 2022-12-02 RX ORDER — DIPHENHYDRAMINE HYDROCHLORIDE 50 MG/ML
12.5 INJECTION INTRAMUSCULAR; INTRAVENOUS
Status: DISCONTINUED | OUTPATIENT
Start: 2022-12-02 | End: 2022-12-02 | Stop reason: HOSPADM

## 2022-12-02 RX ORDER — SODIUM CHLORIDE 9 MG/ML
INJECTION, SOLUTION INTRAVENOUS CONTINUOUS
Status: DISCONTINUED | OUTPATIENT
Start: 2022-12-02 | End: 2022-12-02 | Stop reason: HOSPADM

## 2022-12-02 RX ORDER — ONDANSETRON 2 MG/ML
4 INJECTION INTRAMUSCULAR; INTRAVENOUS
Status: DISCONTINUED | OUTPATIENT
Start: 2022-12-02 | End: 2022-12-02 | Stop reason: HOSPADM

## 2022-12-02 RX ORDER — LABETALOL HYDROCHLORIDE 5 MG/ML
5 INJECTION, SOLUTION INTRAVENOUS
Status: DISCONTINUED | OUTPATIENT
Start: 2022-12-02 | End: 2022-12-02 | Stop reason: HOSPADM

## 2022-12-02 RX ORDER — MEPERIDINE HYDROCHLORIDE 25 MG/ML
12.5 INJECTION INTRAMUSCULAR; INTRAVENOUS; SUBCUTANEOUS EVERY 5 MIN PRN
Status: DISCONTINUED | OUTPATIENT
Start: 2022-12-02 | End: 2022-12-02 | Stop reason: HOSPADM

## 2022-12-02 RX ORDER — DEXMEDETOMIDINE HYDROCHLORIDE 100 UG/ML
INJECTION, SOLUTION INTRAVENOUS PRN
Status: DISCONTINUED | OUTPATIENT
Start: 2022-12-02 | End: 2022-12-02 | Stop reason: SDUPTHER

## 2022-12-02 RX ORDER — MAGNESIUM HYDROXIDE 1200 MG/15ML
LIQUID ORAL CONTINUOUS PRN
Status: COMPLETED | OUTPATIENT
Start: 2022-12-02 | End: 2022-12-02

## 2022-12-02 RX ORDER — PROPOFOL 10 MG/ML
INJECTION, EMULSION INTRAVENOUS PRN
Status: DISCONTINUED | OUTPATIENT
Start: 2022-12-02 | End: 2022-12-02 | Stop reason: SDUPTHER

## 2022-12-02 RX ORDER — FENTANYL CITRATE 50 UG/ML
25 INJECTION, SOLUTION INTRAMUSCULAR; INTRAVENOUS EVERY 5 MIN PRN
Status: DISCONTINUED | OUTPATIENT
Start: 2022-12-02 | End: 2022-12-02 | Stop reason: HOSPADM

## 2022-12-02 RX ORDER — LIDOCAINE HYDROCHLORIDE 10 MG/ML
INJECTION, SOLUTION EPIDURAL; INFILTRATION; INTRACAUDAL; PERINEURAL
Status: COMPLETED | OUTPATIENT
Start: 2022-12-02 | End: 2022-12-02

## 2022-12-02 RX ORDER — GLYCOPYRROLATE 0.2 MG/ML
INJECTION INTRAMUSCULAR; INTRAVENOUS PRN
Status: DISCONTINUED | OUTPATIENT
Start: 2022-12-02 | End: 2022-12-02 | Stop reason: SDUPTHER

## 2022-12-02 RX ORDER — MIDAZOLAM HYDROCHLORIDE 1 MG/ML
INJECTION INTRAMUSCULAR; INTRAVENOUS PRN
Status: DISCONTINUED | OUTPATIENT
Start: 2022-12-02 | End: 2022-12-02 | Stop reason: SDUPTHER

## 2022-12-02 RX ORDER — OXYCODONE HYDROCHLORIDE 5 MG/1
5 TABLET ORAL PRN
Status: COMPLETED | OUTPATIENT
Start: 2022-12-02 | End: 2022-12-02

## 2022-12-02 RX ADMIN — SODIUM CHLORIDE: 9 INJECTION, SOLUTION INTRAVENOUS at 08:38

## 2022-12-02 RX ADMIN — PROPOFOL 30 MG: 10 INJECTION, EMULSION INTRAVENOUS at 08:59

## 2022-12-02 RX ADMIN — PROPOFOL 200 MCG/KG/MIN: 10 INJECTION, EMULSION INTRAVENOUS at 08:50

## 2022-12-02 RX ADMIN — HYDROMORPHONE HYDROCHLORIDE 0.5 MG: 1 INJECTION, SOLUTION INTRAMUSCULAR; INTRAVENOUS; SUBCUTANEOUS at 09:46

## 2022-12-02 RX ADMIN — MIDAZOLAM 2 MG: 1 INJECTION INTRAMUSCULAR; INTRAVENOUS at 08:45

## 2022-12-02 RX ADMIN — Medication 30 MG: at 08:50

## 2022-12-02 RX ADMIN — OXYCODONE 5 MG: 5 TABLET ORAL at 11:11

## 2022-12-02 RX ADMIN — HYDROMORPHONE HYDROCHLORIDE 0.5 MG: 1 INJECTION, SOLUTION INTRAMUSCULAR; INTRAVENOUS; SUBCUTANEOUS at 08:14

## 2022-12-02 RX ADMIN — CEFAZOLIN 2000 MG: 2 INJECTION, POWDER, FOR SOLUTION INTRAMUSCULAR; INTRAVENOUS at 08:48

## 2022-12-02 RX ADMIN — DEXMEDETOMIDINE HYDROCHLORIDE 15 MCG: 100 INJECTION, SOLUTION INTRAVENOUS at 08:50

## 2022-12-02 RX ADMIN — PROPOFOL 30 MG: 10 INJECTION, EMULSION INTRAVENOUS at 08:50

## 2022-12-02 RX ADMIN — GLYCOPYRROLATE 0.2 MG: 0.2 INJECTION, SOLUTION INTRAMUSCULAR; INTRAVENOUS at 08:45

## 2022-12-02 RX ADMIN — LIDOCAINE HYDROCHLORIDE 100 MG: 20 INJECTION, SOLUTION EPIDURAL; INFILTRATION; INTRACAUDAL; PERINEURAL at 08:50

## 2022-12-02 ASSESSMENT — LIFESTYLE VARIABLES: SMOKING_STATUS: 1

## 2022-12-02 ASSESSMENT — PAIN DESCRIPTION - PAIN TYPE
TYPE: SURGICAL PAIN

## 2022-12-02 ASSESSMENT — PAIN SCALES - GENERAL
PAINLEVEL_OUTOF10: 4
PAINLEVEL_OUTOF10: 9
PAINLEVEL_OUTOF10: 8
PAINLEVEL_OUTOF10: 3
PAINLEVEL_OUTOF10: 4

## 2022-12-02 ASSESSMENT — ENCOUNTER SYMPTOMS: SHORTNESS OF BREATH: 0

## 2022-12-02 ASSESSMENT — PAIN - FUNCTIONAL ASSESSMENT
PAIN_FUNCTIONAL_ASSESSMENT: ACTIVITIES ARE NOT PREVENTED
PAIN_FUNCTIONAL_ASSESSMENT: ACTIVITIES ARE NOT PREVENTED
PAIN_FUNCTIONAL_ASSESSMENT: 0-10
PAIN_FUNCTIONAL_ASSESSMENT: PREVENTS OR INTERFERES SOME ACTIVE ACTIVITIES AND ADLS
PAIN_FUNCTIONAL_ASSESSMENT: ACTIVITIES ARE NOT PREVENTED

## 2022-12-02 ASSESSMENT — PAIN DESCRIPTION - LOCATION
LOCATION: ABDOMEN

## 2022-12-02 ASSESSMENT — PAIN DESCRIPTION - ONSET
ONSET: ON-GOING

## 2022-12-02 ASSESSMENT — PAIN DESCRIPTION - FREQUENCY
FREQUENCY: CONTINUOUS

## 2022-12-02 ASSESSMENT — PAIN DESCRIPTION - DESCRIPTORS
DESCRIPTORS: DISCOMFORT
DESCRIPTORS: ACHING
DESCRIPTORS: DISCOMFORT
DESCRIPTORS: ACHING
DESCRIPTORS: DISCOMFORT

## 2022-12-02 ASSESSMENT — PAIN DESCRIPTION - ORIENTATION: ORIENTATION: MID

## 2022-12-02 NOTE — ANESTHESIA POSTPROCEDURE EVALUATION
Department of Anesthesiology  Postprocedure Note    Patient: Ron Jimenez  MRN: [id number]  YOB: 1970  Date of evaluation: 12/2/2022      Procedure Summary     Date: 12/02/22 Room / Location: [address]    Anesthesia Start: 5994 Anesthesia Stop: 1891    Procedure: EXCISION ABDOMINAL WALL NODULE, REPAIR FASCIAL DEFECT (Abdomen) Diagnosis:       Recurrent incisional hernia      (RECURRENT INCISIONAL HERNIA)    Surgeons: Chance Hawk MD Responsible Provider: Carlos Macias MD    Anesthesia Type: MAC ASA Status: 3          Anesthesia Type: No value filed.     Cande Phase I: Cande Score: 9    Cande Phase II:        Anesthesia Post Evaluation    Patient location during evaluation: PACU  Patient participation: complete - patient participated  Level of consciousness: awake  Airway patency: patent  Nausea & Vomiting: no nausea  Complications: no  Cardiovascular status: hemodynamically stable  Respiratory status: acceptable  Hydration status: euvolemic  Multimodal analgesia pain management approach

## 2022-12-02 NOTE — BRIEF OP NOTE
Brief Postoperative Note      Patient: Maxine Abernathy  YOB: 1970  MRN: [id number]    Date of Procedure: 12/2/2022    Pre-Op Diagnosis: RECURRENT INCISIONAL HERNIA    Post-Op Diagnosis:  stitch abscess       Procedure: excision abdominal wall nodule, repair fascial defect    Surgeon(s):  Alberto Martines MD    Assistant:  Surgical Assistant: Pilo Melton    Anesthesia: Monitor Anesthesia Care    Estimated Blood Loss (mL): Minimal    Complications: None    Specimens:   ID Type Source Tests Collected by Time Destination   1 : 1. abdominal wall abscess culture swab Specimen Abdomen CULTURE, SURGICAL, CULTURE, ANAEROBIC AND AEROBIC Alberto Martines MD 12/2/2022 7413    A : A.) Abdominal wall nodule Tissue Tissue SURGICAL PATHOLOGY Alberto Martines MD 12/2/2022 8631        Implants:  * No implants in log *      Drains: * No LDAs found *    Findings: stitch abscess from previous repair    Electronically signed by Alberto Martines MD on 12/2/2022 at 9:14 AM

## 2022-12-02 NOTE — DISCHARGE INSTRUCTIONS
Fidelia Vinson MD     General and Vascular Surgery   Debbie Price MD     [address] [facility] [address]     [address]  [place], [place]    Phone: [phone]           Fax: [phone]                                                                 POST-OPERATIVE INSTRUCTIONS FOR HERNIA REPAIR    Call the office to schedule your post-operative appointment with your surgeon for two (2) weeks. You will have a water occlusive glue closing your incisions. You may shower. Wash incisions gently, and pat them dry. Do not rub your incisions. Do not soak incisions in tub baths, hot tubs or pools    General guidelines for activity:  Avoid strenuous activity or lifting/pushing/pulling anything heavier than 10 pounds for 6 weeks. It is OK to be up walking around; walking up and down stairs is also OK. Do what is comfortable: stop and rest when you feel tired. Drink plenty of fluids and stay on a bland diet for 2-3 days after surgery. Do NOT drive for 5 days and while taking your narcotic pain medicine. Watch for signs of infection:   Fever over 100.5°   Excessive warmth or bright redness around your incisions  Leakage of bloody or cloudy fluid from you incisions    Take your usual pain medicine as prescribed    If you experience constipation  Increase your water intake, and activity; walking is best.  A stool softener or mild laxative may be necessary if you still have not had a bowel  movement ; call the office for further instructions.

## 2022-12-02 NOTE — H&P
Update History & Physical    The patient's History and Physical of November 16, 2022 was reviewed with the patient and I examined the patient. There was no change. Plan recurrent incisional hernia repair. The surgical site was confirmed by the patient and me. Plan: The risks, benefits, expected outcome, and alternative to the recommended procedure have been discussed with the patient. Patient understands and wants to proceed with the procedure.      Electronically signed by Emelia Osman MD on 12/2/2022 at 8:25 AM

## 2022-12-02 NOTE — ANESTHESIA PRE PROCEDURE
Department of Anesthesiology  Preprocedure Note       Name:  Ron Jimenez   Age:  47 y.o.  :  1970                                          MRN:  [id number]         Date:  2022      Surgeon: Anat Hancock):  Chance Hawk MD    Procedure: Procedure(s):  RECURRENT INCISIONAL HERNIA REPAIR WITH MESH    Medications prior to admission:   Prior to Admission medications    Medication Sig Start Date End Date Taking? Authorizing Provider   Teriparatide, Recombinant, (FORTEO) 600 MCG/2.4ML SOPN injection Inject 0.08 mLs into the skin daily OK to substitute teriparatide Alvogen () 22   Anaid Kamara MD   Insulin Pen Needle (PEN NEEDLES 3/16\") 31G X 5 MM MISC Use one needle for each daily dose. 22   Anaid Kamara MD   dicyclomine (BENTYL) 10 MG capsule Take 1 capsule by mouth every 6 hours as needed (cramps) 22   Madison Rowell MD   metoclopramide (REGLAN) 10 MG tablet Take 1 tablet by mouth 4 times daily WARNING:  May cause drowsiness. May impair ability to operate vehicles or machinery. Do not use in combination with alcohol. 22   Madison Rowell MD   oxyCODONE (ROXICODONE) 5 MG immediate release tablet Take 1 tablet by mouth every 6 hours as needed for Pain for up to 30 days.   Patient not taking: Reported on 2022 11/15/22 12/15/22  Temo Chua MD   lipase-protease-amylase (CREON) 79877-24814 units delayed release capsule Take 1 capsule by mouth 3 times daily (with meals) 11/10/22 12/10/22  Temo Chua MD   cyanocobalamin 1000 MCG/ML injection Inject into the muscle 21   Historical Provider, MD   atorvastatin (LIPITOR) 40 MG tablet TAKE 1 TABLET BY MOUTH EVERYDAY AT BEDTIME 22   Temo Chua MD   pantoprazole (PROTONIX) 40 MG tablet TAKE 1 TABLET BY MOUTH TWICE A DAY 22   Temo Chua MD   traZODone (DESYREL) 100 MG tablet Take 2 tablets by mouth nightly 22   Temo Chua MD   FLUoxetine (PROZAC) 20 MG capsule Take 1 capsule by mouth daily 6/29/22   Kendra Charles MD   clopidogrel (PLAVIX) 75 MG tablet TAKE 1 TABLET BY MOUTH EVERY DAY 4/13/22   Kika Nieto MD   ondansetron (ZOFRAN ODT) 4 MG disintegrating tablet Take 1 tablet by mouth every 8 hours as needed for Nausea 3/7/22   Criselda Colby MD   gabapentin (NEURONTIN) 600 MG tablet Take 600 mg by mouth 4 times daily. 8/2/21   Historical Provider, MD   sildenafil (REVATIO) 20 MG tablet Take 4 tablets by mouth as needed (30 min prior to intercourse)  Patient not taking: Reported on 11/28/2022 1/11/21   Kendra Charles MD   calcium-vitamin D (Yane Amen) 500-200 MG-UNIT per tablet Take 1 tablet by mouth 2 times daily     Historical Provider, MD   Multiple Vitamin TABS Take 1 tablet by mouth daily     Historical Provider, MD       Current medications:    Current Facility-Administered Medications   Medication Dose Route Frequency Provider Last Rate Last Admin    0.9 % sodium chloride infusion   IntraVENous Continuous Laury Astudillo MD        sodium chloride flush 0.9 % injection 5-40 mL  5-40 mL IntraVENous 2 times per day Laury Astudillo MD        sodium chloride flush 0.9 % injection 5-40 mL  5-40 mL IntraVENous PRN Laury Astudillo MD        0.9 % sodium chloride infusion   IntraVENous PRN Laury Astudillo MD        ceFAZolin (ANCEF) 2,000 mg in dextrose 5 % 50 mL IVPB (mini-bag)  2,000 mg IntraVENous Once Carlos Camacho MD           Allergies:     Allergies   Allergen Reactions    Nsaids Nausea Only and Other (See Comments)     Hx of Barretts esophagus      Prochlorperazine Other (See Comments)     No allergic reaction, patient reported sense of \"restlessness\" and fidgiting    Valtrex [Valacyclovir Hcl] Diarrhea    Fentanyl Itching    Morphine And Related Hives and Itching    Tolmetin Nausea Only     Hx of Barretts esophagus       Problem List:    Patient Active Problem List   Diagnosis Code    Insomnia-chronic intermittent-- treated with edible THC G47.00    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 Z90.81    Chronic pain syndrome- abdominal and head G89.4    History of stroke: right insular cortex on June 8, 2014 (small PFO; hypergoag w/u neg,  neurology) Z86.73    Gastroesophageal reflux disease with esophagitis--on daily ppi K21.00    Intractable chronic migraine without aura and with status migrainosus G43.711    Iron deficiency anemia D50.9    B12 deficiency E53.8    Malignant neoplasm of left kidney (HCC) clear cell. 8/1/18 Dr Thomas Orozco. C64.2    History of depression Z86.59    History of alcoholism ([facility] [address] F10.21    History of total knee arthroplasty, right Z96.651    Hematoma of right knee region S80. Josefa Mo TIA (transient ischemic attack) LUE weakness 3/21 G45.9    Tobacco use Z72.0    Acute deep vein thrombosis (DVT) of calf muscle vein of both lower extremities ([facility]) I82.463    Closed compression fracture of body of L1 vertebra (HCC) S32.010A    Osteopenia of multiple sites M85.89    Current smoker F17.200    Intractable nausea and vomiting R11.2    Pain syndrome, chronic G89.4    Exocrine pancreatic insufficiency K86.81    Recurrent incisional hernia K43.2       Past Medical History:        Diagnosis Date    Alcoholism ([address]     last drink 2015    Allergic rhinitis, seasonal     Anemia     Arthritis     right knee    Vaughn's esophagus     Benign intracranial hypertension     Cancer (HCC)     left kidney    Carpal tunnel syndrome     Chronic pain     Depression     Ear infection 05/06/2022    GERD (gastroesophageal reflux disease)     Headache(784.0)     History of blood transfusion     2018    History of loop recorder 2022    placed in 2021 and removed in 2022 d/t infection    History of spinal fracture 2021    L1,L7,L8 fall T5,T8  - unknown 2022    History of tobacco use     Quit 7/2014    Hyperlipidemia     Left wrist fracture 2020    fall    Migraine     chronic for 1 year    Morbid obesity ([address]     Movement disorder     Onychomycosis     Sleep apnea, obstructive     Severe uses cpap stop bang 6    Unspecified cerebral artery occlusion with cerebral infarction 2014    slight weakness in left arm    Wears dentures     full set    Wears glasses        Past Surgical History:        Procedure Laterality Date    ABDOMEN SURGERY      gastric bypass    ABDOMEN SURGERY  04/07/2016    repair of recurrent incisional hernia with mesh, removal of old mesh, bilateral component separation    ABDOMINAL EXPLORATION SURGERY  01/11/2016    exp lap, lysis of adhesions, small bowel resection    CARPAL TUNNEL RELEASE      bilat    COLONOSCOPY N/A 05/31/2022    COLONOSCOPY POLYPECTOMY SNARE/COLD BIOPSY performed by Anyi Gregorio MD at [address] [place] [address], [facility]      ENDOSCOPY, COLON, DIAGNOSTIC      EYE SURGERY Bilateral     Lasik    GASTRIC BYPASS SURGERY  01/2009    Has lost about 200 pounds.     HERNIA REPAIR  03/23/2016    ventral    KIDNEY REMOVAL Left 08/01/2018    [facility]     KNEE ARTHROSCOPY Right 07/11/2012    RIGHT KNEE ARTHROSCOPY WITH CHONDROPLASTY    KNEE SURGERY Right 02/18/2020    INCISION AND DRAINAGE RIGHT KNEE AND WOULD VAC PLACEMENT performed by Jarvis Nunes MD at . [facility] Right 02/26/2021    INCISION AND DRAINAGE OF RIGHT KNEE HEMATOMA performed by Lizz Gill MD at . [facility] Right 03/05/2021    INCISION AND DRAINAGE AND CLOSURE RIGHT TOTAL KNEE performed by Lizz Gill MD at . [address] Left 03/08/2016    CT biopsy ablasion left kidney    OTHER SURGICAL HISTORY  2016    small intestine 12 inch removed, 2 hernia surgeries, another surgery to drain infection from incision.      REVISION TOTAL KNEE ARTHROPLASTY Right 12/17/2019    RIGHT REVISION TIBIA TOTAL KNEE REPLACEMENT performed by Shirin Espinoza MD at [address] [place] [address] Right 01/22/2020    RIGHT KNEE IRRIGATION AND DEBRIDEMENT WITH POLY EXCHANGE performed by Shaylee Grimes MD at [address] [place] [address] Right 02/16/2021    RIGHT REMOVAL OF EXPLANT AND TOTAL KNEE REPLACEMENT [facility]  10/15/2013    RIGHT    TOTAL KNEE ARTHROPLASTY Right 08/12/2020    RIGHT ARTHROPLASY  RESECTION WITH INSERTION OF SPACER performed by Shirin Espinoza MD at [facility]  06/07/2016    UPPER GASTROINTESTINAL ENDOSCOPY  04/02/2018    UPPER GASTROINTESTINAL ENDOSCOPY N/A 03/24/2022    ESOPHAGOGASTRODUODENOSCOPY performed by Lonny Olsen MD at [address] [place] [address]  05/31/2022    ESOPHAGOGASTRODUODENOSCOPY WITH BIOPSY performed by Bella Bill MD at [address] [place] [address] History:    Social History     Tobacco Use    Smoking status: Every Day     Packs/day: 0.50     Years: 25.00     Pack years: 12.50     Types: Cigarettes     Start date: 1/1/1985    Smokeless tobacco: Never    Tobacco comments:     Patient vapes   Substance Use Topics    Alcohol use: Not Currently     Comment: last drink 2015                                Ready to quit: Not Answered  Counseling given: Not Answered  Tobacco comments: Patient vapes      Vital Signs (Current):   Vitals:    11/22/22 1614 12/02/22 0736   BP:  121/74   Pulse:  (!) 48   Resp:  16   Temp:  97.1 °F (36.2 °C)   TempSrc:  Temporal   SpO2:  99%   Weight: 200 lb (90.7 kg) 197 lb (89.4 kg)   Height: 6' (1.829 m) 5' 10\" (1.778 m)                                              BP Readings from Last 3 Encounters: 12/02/22 121/74   11/28/22 110/60   11/23/22 113/61       NPO Status: Time of last liquid consumption: 1900                        Time of last solid consumption: 1900                        Date of last liquid consumption: 12/01/22                        Date of last solid food consumption: 12/01/22    BMI:   Wt Readings from Last 3 Encounters:   12/02/22 197 lb (89.4 kg)   11/28/22 199 lb (90.3 kg)   11/23/22 202 lb 3.2 oz (91.7 kg)     Body mass index is 28.27 kg/m². CBC:   Lab Results   Component Value Date/Time    WBC 6.5 11/21/2022 08:05 PM    RBC 4.15 11/21/2022 08:05 PM    RBC 4.94 01/07/2015 10:47 AM    HGB 12.6 11/21/2022 08:05 PM    HCT 37.2 11/21/2022 08:05 PM    MCV 89.6 11/21/2022 08:05 PM    RDW 15.5 11/21/2022 08:05 PM     11/21/2022 08:05 PM       CMP:   Lab Results   Component Value Date/Time     11/21/2022 08:05 PM    K 3.8 11/21/2022 08:05 PM     11/21/2022 08:05 PM    CO2 26 11/21/2022 08:05 PM    BUN 11 11/21/2022 08:05 PM    CREATININE 1.0 11/21/2022 08:05 PM    GFRAA >60 09/26/2022 07:27 PM    AGRATIO 1.7 11/21/2022 08:05 PM    LABGLOM >60 11/21/2022 08:05 PM    GLUCOSE 91 11/21/2022 08:05 PM    GLUCOSE 84 01/07/2015 10:47 AM    PROT 5.9 11/21/2022 08:05 PM    PROT 6.7 01/07/2015 10:47 AM    CALCIUM 8.9 11/21/2022 08:05 PM    BILITOT 0.3 11/21/2022 08:05 PM    ALKPHOS 102 11/21/2022 08:05 PM    AST 11 11/21/2022 08:05 PM    ALT 9 11/21/2022 08:05 PM       POC Tests: No results for input(s): POCGLU, POCNA, POCK, POCCL, POCBUN, POCHEMO, POCHCT in the last 72 hours.     Coags:   Lab Results   Component Value Date/Time    PROTIME 13.2 08/05/2021 06:49 AM    INR 1.16 08/05/2021 06:49 AM    APTT 37.6 02/09/2021 08:48 AM       HCG (If Applicable): No results found for: PREGTESTUR, PREGSERUM, HCG, HCGQUANT     ABGs: No results found for: PHART, PO2ART, HCU6QRD, GIN2GKI, BEART, D3FHQDBD     Type & Screen (If Applicable):  No results found for: LABABO, LABRH    Drug/Infectious Status (If Applicable):  No results found for: HIV, HEPCAB    COVID-19 Screening (If Applicable):   Lab Results   Component Value Date/Time    COVID19 Not Detected 01/18/2022 01:17 AM    COVID19 Not Detected 02/12/2021 01:45 PM    COVID19 NOT DETECTED 08/06/2020 11:11 AM           Anesthesia Evaluation  Patient summary reviewed no history of anesthetic complications:   Airway: Mallampati: II  TM distance: <3 FB   Neck ROM: full  Mouth opening: > = 3 FB   Dental: normal exam   (+) lower dentures and upper dentures      Pulmonary:normal exam  breath sounds clear to auscultation  (+) sleep apnea: on CPAP,  current smoker    (-) shortness of breath                           Cardiovascular:  Exercise tolerance: no interval change,   (+) hyperlipidemia        Rhythm: regular  Rate: normal                    Neuro/Psych:   (+) CVA: residual symptoms, headaches:, depression/anxiety             GI/Hepatic/Renal:   (+) GERD:,          ROS comment: S/p gastric bypass. Endo/Other:    (+) : arthritis:., .                 Abdominal:             Vascular: negative vascular ROS. Other Findings:           Anesthesia Plan      MAC     ASA 3       Induction: intravenous. Anesthetic plan and risks discussed with patient. Plan discussed with CRNA.     Attending anesthesiologist reviewed and agrees with Jak Barrow MD   12/2/2022

## 2022-12-02 NOTE — PROGRESS NOTES
Vss. C/o 4/10 surgical pain. Analgesics given. Surgery site appears the same. Tolerated sitting up and po fluids and crackers well. Family at bedside. Verbalized understanding of discharge instructions.

## 2022-12-02 NOTE — OP NOTE
[address] [place]                                 OPERATIVE REPORT    PATIENT NAME: Aileen Bajwa                    :        1970  MED REC NO:   [id number]                          ROOM:  ACCOUNT NO:   [de-identified]                           ADMIT DATE: 2022  PROVIDER:     Aria Lazo MD    DATE OF PROCEDURE:  2022    PREOPERATIVE DIAGNOSIS:  Recurrent incisional hernia. POSTOPERATIVE DIAGNOSIS:  Stitch abscess. OPERATION PERFORMED:  1. Excision of abdominal wall nodule. 2.  Primary repair of fascial defect. SURGEON:  Aria Lazo MD    ANESTHESIA:  MAC with local anesthetic. ASA CLASS:  III. ANTIBIOTICS:  Ancef 2 gm IV. DVT PROPHYLAXIS:  Bilateral pneumatic compression devices. ESTIMATED BLOOD LOSS:  Minimal.    SPECIMEN:  1. Fluid for culture. 2.  Abdominal wall nodule. INDICATIONS:  The patient is a 50-year-old gentleman who has a history  of chronic abdominal pain. He has an extensive surgical history. He  presents with painful nodule superior into the right of the umbilicus. He has a previous history of hernia repairs and this is felt to likely  represent a small incarcerated hernia. I recommended repair of  recurrent hernia. Risks of bleeding, infection, anesthesia, risk of  recurrence as well as risk of failure to resolve chronic pain was  discussed at length and he was agreeable to proceed. OPERATIVE PROCEDURE:  The patient was brought to the operating suite and  placed in supine position on the operating room table. Anesthesia was  induced that he tolerated well. The abdomen was prepped and draped in  the usual sterile fashion and a time-out performed. After injecting  local anesthetic, a periumbilical incision was made to his previous  scar. We carried this down to the subcutaneous tissue where the firm  nodule was encountered.   It was circumferentially dissected down towards  fascia. I then entered what I thought was a hernia sac; however, this  was more of a firm nodule and purulence was encountered. This was  cultured. Once this nodule was excised, there was a deep Prolene suture  likely to represent a stitch abscess causing his symptoms. It was  removed. There was some dense and firm nodularity below this that was  excised with cautery and sharp dissection. It was likely residual  inflammation of a scar tissue and excising this a 1 cm fascial defect  was encountered and this was closed primarily with a 0 PDS in a  figure-of-eight fashion x2. I did not use the mesh given the presence  of infection. Remainder of the soft tissue felt normal.  No other  abnormalities are appreciated. Marcaine was injected at the level of  the fascia. The skin and the wound were closed with layered Vicryl and  Dermabond applied. The patient tolerated the procedure well. He was  taken to PACU in stable condition. All counts were correct at the end  of the case.         Lia Hardy MD    D: 12/02/2022 9:37:47       T: 12/02/2022 16:04:02     [email or web address]  Job#: [id number]     Doc#: [id number]    CC:  Tila Carranza MD

## 2022-12-04 LAB
ANAEROBIC CULTURE: ABNORMAL
CULTURE SURGICAL: ABNORMAL
GRAM STAIN RESULT: ABNORMAL
ORGANISM: ABNORMAL

## 2022-12-08 RX ORDER — TRAZODONE HYDROCHLORIDE 100 MG/1
TABLET ORAL
Qty: 180 TABLET | Refills: 1 | Status: SHIPPED | OUTPATIENT
Start: 2022-12-08

## 2022-12-08 RX ORDER — CLOPIDOGREL BISULFATE 75 MG/1
TABLET ORAL
Qty: 90 TABLET | Refills: 3 | Status: SHIPPED | OUTPATIENT
Start: 2022-12-08

## 2022-12-08 RX ORDER — FLUOXETINE HYDROCHLORIDE 20 MG/1
CAPSULE ORAL
Qty: 90 CAPSULE | Refills: 1 | Status: SHIPPED | OUTPATIENT
Start: 2022-12-08

## 2022-12-09 LAB
24HR URINE VOLUME (ML): 1600 ML
CALCIUM 24 HOUR URINE: 78 MG/24 HR (ref 42–353)
CREATININE 24 HOUR URINE: 1.3 G/24HR (ref 0.6–2.5)
SODIUM 24 HOUR URINE: 94 MMOL/24 HR (ref 40–220)

## 2022-12-11 ENCOUNTER — APPOINTMENT (OUTPATIENT)
Dept: CT IMAGING | Age: 52
End: 2022-12-11
Payer: COMMERCIAL

## 2022-12-11 ENCOUNTER — HOSPITAL ENCOUNTER (EMERGENCY)
Age: 52
Discharge: HOME | End: 2022-12-11
Attending: EMERGENCY MEDICINE
Payer: COMMERCIAL

## 2022-12-11 VITALS
DIASTOLIC BLOOD PRESSURE: 78 MMHG | RESPIRATION RATE: 16 BRPM | WEIGHT: 202.38 LBS | HEART RATE: 56 BPM | OXYGEN SATURATION: 98 % | SYSTOLIC BLOOD PRESSURE: 124 MMHG | BODY MASS INDEX: 28.33 KG/M2 | TEMPERATURE: 97.4 F | HEIGHT: 71 IN

## 2022-12-11 DIAGNOSIS — R18.8 ABDOMINAL FLUID COLLECTION: Primary | ICD-10-CM

## 2022-12-11 LAB
ALBUMIN SERPL-MCNC: 3.9 G/DL (ref 3.4–5)
ALP BLD-CCNC: 96 U/L (ref 40–129)
ALT SERPL-CCNC: 15 U/L (ref 10–40)
ANION GAP SERPL CALCULATED.3IONS-SCNC: 14 MMOL/L (ref 3–16)
AST SERPL-CCNC: 15 U/L (ref 15–37)
BACTERIA: ABNORMAL /HPF
BASOPHILS ABSOLUTE: 0.1 K/UL (ref 0–0.2)
BASOPHILS RELATIVE PERCENT: 1 %
BILIRUB SERPL-MCNC: 0.3 MG/DL (ref 0–1)
BILIRUBIN DIRECT: <0.2 MG/DL (ref 0–0.3)
BILIRUBIN URINE: NEGATIVE
BILIRUBIN, INDIRECT: NORMAL MG/DL (ref 0–1)
BLOOD, URINE: NEGATIVE
BUN BLDV-MCNC: 13 MG/DL (ref 7–20)
CALCIUM SERPL-MCNC: 9 MG/DL (ref 8.3–10.6)
CHLORIDE BLD-SCNC: 100 MMOL/L (ref 99–110)
CLARITY: CLEAR
CO2: 23 MMOL/L (ref 21–32)
COLOR: YELLOW
CREAT SERPL-MCNC: 1 MG/DL (ref 0.9–1.3)
EOSINOPHILS ABSOLUTE: 0.3 K/UL (ref 0–0.6)
EOSINOPHILS RELATIVE PERCENT: 3 %
EPITHELIAL CELLS, UA: 1 /HPF (ref 0–5)
GFR SERPL CREATININE-BSD FRML MDRD: >60 ML/MIN/{1.73_M2}
GLUCOSE BLD-MCNC: 95 MG/DL (ref 70–99)
GLUCOSE URINE: NEGATIVE MG/DL
HCT VFR BLD CALC: 39.1 % (ref 40.5–52.5)
HEMOGLOBIN: 12.7 G/DL (ref 13.5–17.5)
HYALINE CASTS: 0 /LPF (ref 0–8)
KETONES, URINE: NEGATIVE MG/DL
LEUKOCYTE ESTERASE, URINE: ABNORMAL
LIPASE: 48 U/L (ref 13–60)
LYMPHOCYTES ABSOLUTE: 2.4 K/UL (ref 1–5.1)
LYMPHOCYTES RELATIVE PERCENT: 27.4 %
MAGNESIUM: 2 MG/DL (ref 1.8–2.4)
MCH RBC QN AUTO: 29.7 PG (ref 26–34)
MCHC RBC AUTO-ENTMCNC: 32.5 G/DL (ref 31–36)
MCV RBC AUTO: 91.3 FL (ref 80–100)
MICROSCOPIC EXAMINATION: YES
MONOCYTES ABSOLUTE: 0.5 K/UL (ref 0–1.3)
MONOCYTES RELATIVE PERCENT: 5.3 %
NEUTROPHILS ABSOLUTE: 5.7 K/UL (ref 1.7–7.7)
NEUTROPHILS RELATIVE PERCENT: 63.3 %
NITRITE, URINE: NEGATIVE
PDW BLD-RTO: 15.7 % (ref 12.4–15.4)
PH UA: 5.5 (ref 5–8)
PLATELET # BLD: 328 K/UL (ref 135–450)
PMV BLD AUTO: 8.2 FL (ref 5–10.5)
POTASSIUM REFLEX MAGNESIUM: 3.5 MMOL/L (ref 3.5–5.1)
PROTEIN UA: NEGATIVE MG/DL
RBC # BLD: 4.28 M/UL (ref 4.2–5.9)
RBC UA: 1 /HPF (ref 0–4)
SODIUM BLD-SCNC: 137 MMOL/L (ref 136–145)
SPECIFIC GRAVITY UA: 1.01 (ref 1–1.03)
TOTAL CK: 83 U/L (ref 39–308)
TOTAL PROTEIN: 6.4 G/DL (ref 6.4–8.2)
URINE REFLEX TO CULTURE: YES
URINE TYPE: ABNORMAL
UROBILINOGEN, URINE: 0.2 E.U./DL
WBC # BLD: 8.9 K/UL (ref 4–11)
WBC UA: 14 /HPF (ref 0–5)

## 2022-12-11 PROCEDURE — 85025 COMPLETE CBC W/AUTO DIFF WBC: CPT

## 2022-12-11 PROCEDURE — 6370000000 HC RX 637 (ALT 250 FOR IP): Performed by: EMERGENCY MEDICINE

## 2022-12-11 PROCEDURE — 83735 ASSAY OF MAGNESIUM: CPT

## 2022-12-11 PROCEDURE — 81001 URINALYSIS AUTO W/SCOPE: CPT

## 2022-12-11 PROCEDURE — 87086 URINE CULTURE/COLONY COUNT: CPT

## 2022-12-11 PROCEDURE — 80048 BASIC METABOLIC PNL TOTAL CA: CPT

## 2022-12-11 PROCEDURE — 82550 ASSAY OF CK (CPK): CPT

## 2022-12-11 PROCEDURE — 99285 EMERGENCY DEPT VISIT HI MDM: CPT

## 2022-12-11 PROCEDURE — 74177 CT ABD & PELVIS W/CONTRAST: CPT

## 2022-12-11 PROCEDURE — 80076 HEPATIC FUNCTION PANEL: CPT

## 2022-12-11 PROCEDURE — 83690 ASSAY OF LIPASE: CPT

## 2022-12-11 PROCEDURE — 6360000004 HC RX CONTRAST MEDICATION: Performed by: EMERGENCY MEDICINE

## 2022-12-11 RX ORDER — ONDANSETRON 4 MG/1
4 TABLET, ORALLY DISINTEGRATING ORAL ONCE
Status: COMPLETED | OUTPATIENT
Start: 2022-12-11 | End: 2022-12-11

## 2022-12-11 RX ORDER — CEPHALEXIN 500 MG/1
500 CAPSULE ORAL 4 TIMES DAILY
Qty: 40 CAPSULE | Refills: 0 | Status: SHIPPED | OUTPATIENT
Start: 2022-12-11 | End: 2022-12-21

## 2022-12-11 RX ORDER — ACETAMINOPHEN 325 MG/1
650 TABLET ORAL ONCE
Status: COMPLETED | OUTPATIENT
Start: 2022-12-11 | End: 2022-12-11

## 2022-12-11 RX ORDER — SULFAMETHOXAZOLE AND TRIMETHOPRIM 800; 160 MG/1; MG/1
1 TABLET ORAL 2 TIMES DAILY
Qty: 20 TABLET | Refills: 0 | Status: SHIPPED | OUTPATIENT
Start: 2022-12-11 | End: 2022-12-21

## 2022-12-11 RX ORDER — HYDROCODONE BITARTRATE AND ACETAMINOPHEN 5; 325 MG/1; MG/1
1 TABLET ORAL ONCE
Status: DISCONTINUED | OUTPATIENT
Start: 2022-12-11 | End: 2022-12-11

## 2022-12-11 RX ADMIN — IOPAMIDOL 75 ML: 755 INJECTION, SOLUTION INTRAVENOUS at 03:01

## 2022-12-11 RX ADMIN — ONDANSETRON 4 MG: 4 TABLET, ORALLY DISINTEGRATING ORAL at 03:59

## 2022-12-11 RX ADMIN — ACETAMINOPHEN 650 MG: 325 TABLET, FILM COATED ORAL at 03:59

## 2022-12-11 ASSESSMENT — ENCOUNTER SYMPTOMS
SHORTNESS OF BREATH: 0
APNEA: 0
CHEST TIGHTNESS: 0
CONSTIPATION: 0
COLOR CHANGE: 0
EYE ITCHING: 0
STRIDOR: 0
WHEEZING: 0
ABDOMINAL PAIN: 1
ABDOMINAL DISTENTION: 0
NAUSEA: 0
VOMITING: 0
CHOKING: 0
BACK PAIN: 0
ANAL BLEEDING: 0
EYE REDNESS: 0
EYE DISCHARGE: 0
EYE PAIN: 0
DIARRHEA: 0
COUGH: 0
PHOTOPHOBIA: 0
BLOOD IN STOOL: 0
RECTAL PAIN: 0

## 2022-12-11 ASSESSMENT — PAIN DESCRIPTION - LOCATION: LOCATION: ABDOMEN

## 2022-12-11 ASSESSMENT — PAIN DESCRIPTION - DESCRIPTORS: DESCRIPTORS: SHARP;CRAMPING

## 2022-12-11 ASSESSMENT — PAIN SCALES - GENERAL: PAINLEVEL_OUTOF10: 9

## 2022-12-11 ASSESSMENT — PAIN DESCRIPTION - ORIENTATION: ORIENTATION: RIGHT;MID

## 2022-12-11 ASSESSMENT — PAIN - FUNCTIONAL ASSESSMENT: PAIN_FUNCTIONAL_ASSESSMENT: 0-10

## 2022-12-11 NOTE — ED PROVIDER NOTES
I PERSONALLY SAW THE PATIENT AND PERFORMED A SUBSTANTIVE PORTION OF THE VISIT INCLUDING ALL ASPECTS OF THE MEDICAL DECISION MAKING PROCESS. [address]      Pt Name: Edy Quiles  MRN: [id number]  Joey 1970  Date of evaluation: 12/11/2022  Provider: Annemarie Howell MD    CHIEF COMPLAINT       Chief Complaint   Patient presents with    Abdominal Pain     Patient with abdominal surgery a week ago. Patient states, \" pain is worse than it has been since surgery. \"        HISTORY OF PRESENT ILLNESS    Edy Quiles is a 47 y.o. male who presents to the emergency department with abdominal pain. Patient presents with abdominal pain. Had surgery a week ago. States that since having surgery the pain is got worse. 10 out of 10 sharp in nature. Worse with movement. Better with rest.  Started spontaneously. Constant in nature. States  the incision sites clean dry intact with no signs of infection. No fevers or chills. No other associated symptoms. Tolerating p.o. without difficulty. Passing gas without issues. Nursing Notes were reviewed. Including nursing noted for FM, Surgical History, Past Medical History, Social History, vitals, and allergies; agree with all. REVIEW OF SYSTEMS       Review of Systems   Constitutional:  Negative for activity change, appetite change, chills, diaphoresis, fatigue, fever and unexpected weight change. HENT:  Negative for congestion, dental problem, drooling, ear discharge and ear pain. Eyes:  Negative for photophobia, pain, discharge, redness, itching and visual disturbance. Respiratory:  Negative for apnea, cough, choking, chest tightness, shortness of breath, wheezing and stridor. Cardiovascular:  Negative for chest pain, palpitations and leg swelling. Gastrointestinal:  Positive for abdominal pain.  Negative for abdominal distention, anal bleeding, blood in stool, constipation, diarrhea, nausea, rectal pain and vomiting. Endocrine: Negative for cold intolerance and heat intolerance. Genitourinary:  Negative for decreased urine volume and urgency. Musculoskeletal:  Negative for arthralgias and back pain. Skin:  Negative for color change and pallor. Neurological:  Negative for tremors, facial asymmetry and weakness. Hematological:  Negative for adenopathy. Does not bruise/bleed easily. Psychiatric/Behavioral:  Negative for agitation, behavioral problems, confusion and decreased concentration. Except as noted above the remainder of the review of systems was reviewed and negative.      PAST MEDICAL HISTORY     Past Medical History:   Diagnosis Date    Alcoholism ([address]     last drink 2015    Allergic rhinitis, seasonal     Anemia     Arthritis     right knee    Vaughn's esophagus     Benign intracranial hypertension     Cancer (HCC)     left kidney    Carpal tunnel syndrome     Chronic pain     Depression     Ear infection 05/06/2022    GERD (gastroesophageal reflux disease)     Headache(784.0)     History of blood transfusion     2018    History of loop recorder 2022    placed in 2021 and removed in 2022 d/t infection    History of spinal fracture 2021    L1,L7,L8 fall T5,T8  - unknown 2022    History of tobacco use     Quit 7/2014    Hyperlipidemia     Left wrist fracture 2020    fall    Migraine     chronic for 1 year    Morbid obesity ([address]     Movement disorder     Onychomycosis     Sleep apnea, obstructive     Severe uses cpap stop bang 6    Unspecified cerebral artery occlusion with cerebral infarction 2014    slight weakness in left arm    Wears dentures     full set    Wears glasses        SURGICAL HISTORY       Past Surgical History:   Procedure Laterality Date    ABDOMEN SURGERY      gastric bypass    ABDOMEN SURGERY  04/07/2016    repair of recurrent incisional hernia with mesh, removal of old mesh, bilateral component separation    ABDOMINAL EXPLORATION SURGERY 01/11/2016    exp lap, lysis of adhesions, small bowel resection    CARPAL TUNNEL RELEASE      bilat    COLONOSCOPY N/A 05/31/2022    COLONOSCOPY POLYPECTOMY SNARE/COLD BIOPSY performed [facility], ESOPHAGUS      ENDOSCOPY, COLON, DIAGNOSTIC      EYE SURGERY Bilateral     Lasik    GASTRIC BYPASS SURGERY  01/2009    Has lost about 200 pounds. HERNIA REPAIR  03/23/2016    ventral    KIDNEY REMOVAL Left 08/01/2018    KNEE ARTHROSCOPY Right 07/11/2013    Dr.Robert WaltersAdelina     KNEE ARTHROSCOPY Right 07/11/2012    RIGHT KNEE ARTHROSCOPY WITH CHONDROPLASTY    KNEE SURGERY Right 02/18/2020    INCISION AND DRAINAGE RIGHT KNEE AND WOULD VAC PLACEMENT performed by Melissa Gregory MD at [place] [address] Right 02/26/2021    INCISION AND DRAINAGE OF RIGHT KNEE HEMATOMA performed by David Larson MD at [place] [address] Right 03/05/2021    INCISION AND DRAINAGE AND CLOSURE RIGHT TOTAL KNEE performed by David Larson MD at [address] [facility] Left 03/08/2016    CT biopsy ablasion left kidney    OTHER SURGICAL HISTORY  2016    small intestine 12 inch removed, 2 hernia surgeries, another surgery to drain infection from incision.      REVISION TOTAL KNEE ARTHROPLASTY Right 12/17/2019    RIGHT REVISION TIBIA TOTAL KNEE REPLACEMENT performed by Melissa Gregory MD at [address] [facility] [address] Right 01/22/2020    RIGHT KNEE IRRIGATION AND DEBRIDEMENT WITH POLY EXCHANGE performed by Alia Benitez MD at [facility] [address] Right 02/16/2021    RIGHT REMOVAL OF EXPLANT AND TOTAL KNEE REPLACEMENT performed by David Larson MD at [id number] [place] [address]  10/15/2013    RIGHT    TOTAL KNEE ARTHROPLASTY Right 08/12/2020    RIGHT ARTHROPLASY  RESECTION WITH INSERTION OF SPACER performed by Melissa Gregory MD at [address]  06/07/2016    UPPER GASTROINTESTINAL ENDOSCOPY  04/02/2018    UPPER GASTROINTESTINAL ENDOSCOPY N/A 03/24/2022    ESOPHAGOGASTRODUODENOSCOPY [facility]  05/31/2022    ESOPHAGOGASTRODUODENOSCOPY WITH BIOPSY performed by Dexter Thakkar MD at [address] [place] [address] N/A 12/2/2022    EXCISION ABDOMINAL WALL NODULE, REPAIR FASCIAL DEFECT performed by Alberto Martines MD at [address]       Previous Medications    ATORVASTATIN (LIPITOR) 40 MG TABLET    TAKE 1 TABLET BY MOUTH EVERYDAY AT BEDTIME    CALCIUM-VITAMIN D (OSCAL-500) 500-200 MG-UNIT PER TABLET    Take 1 tablet by mouth 2 times daily     CLOPIDOGREL (PLAVIX) 75 MG TABLET    TAKE 1 TABLET BY MOUTH EVERY DAY    CYANOCOBALAMIN 1000 MCG/ML INJECTION    Inject into the muscle    DICYCLOMINE (BENTYL) 10 MG CAPSULE    Take 1 capsule by mouth every 6 hours as needed (cramps)    FLUOXETINE (PROZAC) 20 MG CAPSULE    TAKE 1 CAPSULE BY MOUTH EVERY DAY    GABAPENTIN (NEURONTIN) 600 MG TABLET    Take 600 mg by mouth 4 times daily. INSULIN PEN NEEDLE (PEN NEEDLES 3/16\") 31G X 5 MM MISC    Use one needle for each daily dose. LIPASE-PROTEASE-AMYLASE (CREON) 38413-74765 UNITS DELAYED RELEASE CAPSULE    Take 1 capsule by mouth 3 times daily (with meals)    METOCLOPRAMIDE (REGLAN) 10 MG TABLET    Take 1 tablet by mouth 4 times daily WARNING:  May cause drowsiness. May impair ability to operate vehicles or machinery. Do not use in combination with alcohol. MULTIPLE VITAMIN TABS    Take 1 tablet by mouth daily     ONDANSETRON (ZOFRAN ODT) 4 MG DISINTEGRATING TABLET    Take 1 tablet by mouth every 8 hours as needed for Nausea    OXYCODONE (ROXICODONE) 5 MG IMMEDIATE RELEASE TABLET    Take 1 tablet by mouth every 6 hours as needed for Pain for up to 30 days.     PANTOPRAZOLE (PROTONIX) 40 MG TABLET    TAKE 1 TABLET BY MOUTH TWICE A DAY    SILDENAFIL (REVATIO) 20 MG TABLET    Take 4 tablets by mouth as needed (30 min prior to intercourse)    TERIPARATIDE, RECOMBINANT, (FORTEO) 600 MCG/2.4ML SOPN INJECTION    Inject 0.08 mLs into the skin daily OK to substitute teriparatide Alvogen ()    TRAZODONE (DESYREL) 100 MG TABLET    TAKE 2 TABLETS BY MOUTH EVERY DAY AT NIGHT       ALLERGIES     Nsaids, Prochlorperazine, Valtrex [valacyclovir hcl], Fentanyl, Morphine and related, and Tolmetin    FAMILY HISTORY        Family History   Problem Relation Age of Onset    Arthritis Mother     Osteoporosis Mother     Cancer Father         Lymphoma    Heart Disease Maternal Uncle     Heart Disease Maternal Uncle     Diabetes Maternal Uncle        SOCIAL HISTORY       Social History     Socioeconomic History    Marital status:      Spouse name: Ezra Galvan    Number of children: 2    Years of education: None    Highest education level: None   Occupational History    Occupation: [employer], [employer] his son. Tobacco Use    Smoking status: Every Day     Packs/day: 0.50     Years: 25.00     Pack years: 12.50     Types: Cigarettes     Start date: 1/1/1985    Smokeless tobacco: Never    Tobacco comments:     Patient vapes   Vaping Use    Vaping Use: Former    Substances: Nicotine, THC, Flavoring    Devices: Refillable tank, Pre-filled pod   Substance and Sexual Activity    Alcohol use: Not Currently     Comment: last drink 2015    Drug use: Yes     Types: Marijuana Mansi Ege)     Comment: medical [employer] card    Sexual activity: Yes     Partners: Female     Comment: wife [address]       ED Triage Vitals [12/11/22 0145]   BP Temp Temp Source Heart Rate Resp SpO2 Height Weight   123/72 97.4 °F (36.3 °C) Oral 56 18 98 % 5' 11\" (1.803 m) 202 lb 6.1 oz (91.8 kg)       Physical Exam  Vitals and nursing note reviewed. Constitutional:       General: He is not in acute distress. Appearance: He is well-developed. He is not diaphoretic. HENT:      Head: Normocephalic and atraumatic. Eyes:      General:         Right eye: No discharge. Left eye: No discharge. Pupils: Pupils are equal, round, and reactive to light. Neck:      Thyroid: No thyromegaly. Trachea: No tracheal deviation. Cardiovascular:      Rate and Rhythm: Normal rate and regular rhythm. Heart sounds: No murmur heard. Pulmonary:      Breath sounds: No wheezing or rales. Chest:      Chest wall: No tenderness. Abdominal:      General: There is no distension. Palpations: Abdomen is soft. There is no mass. Tenderness: There is no abdominal tenderness. There is no guarding or rebound. Musculoskeletal:         General: No tenderness or deformity. Normal range of motion. Cervical back: Normal range of motion. Skin:     General: Skin is warm. Coloration: Skin is not pale. Findings: No erythema or rash. Comments: Incision site clean dry intact no signs of infection   Neurological:      Mental Status: He is alert. Cranial Nerves: No cranial nerve deficit. Motor: No abnormal muscle tone. Coordination: Coordination normal.       DIAGNOSTIC RESULTS       RADIOLOGY:   Non-plain film images such as CT, Ultrasoundand MRI are read by the radiologist. Plain radiographic images are visualized and preliminarily interpreted by the emergency physician with the below findings:    Impression   1. There is a subcutaneous rim enhancing fluid collection which could   represent a postoperative seroma or abscess just to the right of the   umbilicus measuring 4.7 cm in greatest dimension. 2. Postoperative changes are seen related to Darrian-en-Y gastric bypass, with   resolution of the previously seen mesenteric swirling within the left abdomen. 3. There is a small bowel anastomosis in the mid abdomen with dilated loops   at the anastomotic site, though no proximal obstruction. This dilation may   be postoperative in nature. 4. Other nonacute similar findings as above.      ED BEDSIDE ULTRASOUND:   Performed by ED Physician - none    LABS:  Labs Reviewed   CBC WITH AUTO DIFFERENTIAL - Abnormal; Notable for the following components:       Result Value    Hemoglobin 12.7 (*)     Hematocrit 39.1 (*)     RDW 15.7 (*)     All other components within normal limits   URINALYSIS WITH REFLEX TO CULTURE - Abnormal; Notable for the following components:    Leukocyte Esterase, Urine TRACE (*)     All other components within normal limits   MICROSCOPIC URINALYSIS - Abnormal; Notable for the following components:    WBC, UA 14 (*)     All other components within normal limits   CULTURE, URINE   BASIC METABOLIC PANEL W/ REFLEX TO MG FOR LOW K   LIPASE   HEPATIC FUNCTION PANEL   CK   MAGNESIUM       All other labs were withinnormal range or not returned as of this dictation. EMERGENCY DEPARTMENT COURSE and DIFFERENTIAL DIAGNOSIS/MDM:     PMH, Surgical Hx, FH, Social Hx reviewed by myself (ETOH usage, Tobacco usage, Drug usage reviewed by myself, no pertinent Hx)- No Pertinent Hx     Old records were reviewed by me     MDM 81year-old abdominal pain. CT shows fluid collection. Discussed with patient's surgeon Lashay De La O. Patient is afebrile well-appearing nontoxic with reassuring vital signs and labs. Dr. Ariadna Mcnally both agreed that patient can be discharged home safely with antibiotics. Most likely seroma. General surgery will aspirate in the office outpatient this week. Discussed return precautions with patient. Patient's pain is well controlled. He is nontoxic well-appearing no signs of sepsis. Outpatient follow-up. Disposition  I estimate there is LOW risk for Sepsis, MI, Stroke, Tamponade, PTX, Toxicity or other life threatening etiology thus I consider the discharge disposition reasonable. The patient is at low risk for mortality based on demographic, history and clinical factors.  Given the best available information and clinical assessment, I estimate the risk of hospitalization to be greater than risk of treatment at home. I have explained to the patient that the risk could rapidly change, given precautions for return and instructions. Explained to patient that the risk for mortality is low based on demographic, history and clinical factors. I discussed with patient the results of evaluation in the ED, diagnosis, care, and prognosis. The plan is to discharge to home. Patient is in agreement with plan and questions have been answered. I also discussed with patient the reasons which may require a return visit and the importance of follow-up care. The patient is well-appearing, nontoxic, and improved at the time of discharge. Patient agrees to call to arrange follow-up care as directed. Patient understands to return immediately for worsening/change in symptoms. I PERSONALLY SAW THE PATIENT AND PERFORMED A SUBSTANTIVE PORTION OF THE VISIT INCLUDING ALL ASPECTS OF THE MEDICAL DECISION MAKING PROCESS. The primary clinician of record [address] [place] [address] TIME   Total Critical Caretime was 0 minutes, excluding separately reportable procedures. There was a high probability of clinically significant/life threatening deterioration in the patient's condition which required my urgent intervention. CRITICAL CARE  I personally saw the patient and independently provided 0 minutes of non-concurrent critical care out of the total shared critical care time provided. This excludes seperately billable procedures. Critical care time was provided for patient as above that required close evaluation and/or intervention with concern for potential patient decompensation. PROCEDURES:  Unlessotherwise noted below, none    FINAL IMPRESSION      1. Abdominal fluid collection          DISPOSITION/PLAN   DISPOSITION Decision To Discharge 12/11/2022 03:52:46 AM    PATIENT REFERRED TO:  Juan Roque MD  [facility].  [address]    Call today        DISCHARGE MEDICATIONS:  New Prescriptions    CEPHALEXIN (KEFLEX) 500 MG CAPSULE    Take 1 capsule by mouth 4 times daily for 10 days    SULFAMETHOXAZOLE-TRIMETHOPRIM (BACTRIM DS) 800-160 MG PER TABLET    Take 1 tablet by mouth 2 times daily for 10 days          (Please note that portions ofthis note were completed with a voice recognition program.  Efforts were made to edit the dictations but occasionally words are mis-transcribed.)    Ashkan Galaviz MD(electronically signed)  Attending Emergency Physician        Ashkan Galaviz MD  12/11/22 0032

## 2022-12-12 ENCOUNTER — CARE COORDINATION (OUTPATIENT)
Dept: CARE COORDINATION | Age: 52
End: 2022-12-12

## 2022-12-12 DIAGNOSIS — S32.010A CLOSED COMPRESSION FRACTURE OF BODY OF L1 VERTEBRA (HCC): ICD-10-CM

## 2022-12-12 LAB — URINE CULTURE, ROUTINE: NORMAL

## 2022-12-12 RX ORDER — OXYCODONE HYDROCHLORIDE 5 MG/1
5 TABLET ORAL EVERY 6 HOURS PRN
Qty: 120 TABLET | Refills: 0 | Status: SHIPPED | OUTPATIENT
Start: 2022-12-13 | End: 2023-01-09 | Stop reason: SDUPTHER

## 2022-12-12 SDOH — HEALTH STABILITY: PHYSICAL HEALTH: ON AVERAGE, HOW MANY MINUTES DO YOU ENGAGE IN EXERCISE AT THIS LEVEL?: 30 MIN

## 2022-12-12 SDOH — SOCIAL STABILITY: SOCIAL NETWORK
DO YOU BELONG TO ANY CLUBS OR ORGANIZATIONS SUCH AS CHURCH GROUPS UNIONS, FRATERNAL OR ATHLETIC GROUPS, OR SCHOOL GROUPS?: YES

## 2022-12-12 SDOH — HEALTH STABILITY: MENTAL HEALTH: HOW OFTEN DO YOU HAVE A DRINK CONTAINING ALCOHOL?: NEVER

## 2022-12-12 SDOH — SOCIAL STABILITY: SOCIAL NETWORK: HOW OFTEN DO YOU GET TOGETHER WITH FRIENDS OR RELATIVES?: MORE THAN THREE TIMES A WEEK

## 2022-12-12 SDOH — SOCIAL STABILITY: SOCIAL NETWORK: HOW OFTEN DO YOU ATTEND CHURCH OR RELIGIOUS SERVICES?: MORE THAN 4 TIMES PER YEAR

## 2022-12-12 SDOH — ECONOMIC STABILITY: FOOD INSECURITY: WITHIN THE PAST 12 MONTHS, YOU WORRIED THAT YOUR FOOD WOULD RUN OUT BEFORE YOU GOT MONEY TO BUY MORE.: NEVER TRUE

## 2022-12-12 SDOH — HEALTH STABILITY: MENTAL HEALTH: HOW MANY STANDARD DRINKS CONTAINING ALCOHOL DO YOU HAVE ON A TYPICAL DAY?: PATIENT DOES NOT DRINK

## 2022-12-12 SDOH — ECONOMIC STABILITY: HOUSING INSECURITY: IN THE LAST 12 MONTHS, HOW MANY PLACES HAVE YOU LIVED?: 1

## 2022-12-12 SDOH — ECONOMIC STABILITY: FOOD INSECURITY: WITHIN THE PAST 12 MONTHS, THE FOOD YOU BOUGHT JUST DIDN'T LAST AND YOU DIDN'T HAVE MONEY TO GET MORE.: NEVER TRUE

## 2022-12-12 SDOH — ECONOMIC STABILITY: HOUSING INSECURITY
IN THE LAST 12 MONTHS, WAS THERE A TIME WHEN YOU DID NOT HAVE A STEADY PLACE TO SLEEP OR SLEPT IN A SHELTER (INCLUDING NOW)?: NO

## 2022-12-12 SDOH — ECONOMIC STABILITY: INCOME INSECURITY: IN THE LAST 12 MONTHS, WAS THERE A TIME WHEN YOU WERE NOT ABLE TO PAY THE MORTGAGE OR RENT ON TIME?: NO

## 2022-12-12 SDOH — ECONOMIC STABILITY: TRANSPORTATION INSECURITY
IN THE PAST 12 MONTHS, HAS THE LACK OF TRANSPORTATION KEPT YOU FROM MEDICAL APPOINTMENTS OR FROM GETTING MEDICATIONS?: NO

## 2022-12-12 SDOH — HEALTH STABILITY: MENTAL HEALTH
STRESS IS WHEN SOMEONE FEELS TENSE, NERVOUS, ANXIOUS, OR CAN'T SLEEP AT NIGHT BECAUSE THEIR MIND IS TROUBLED. HOW STRESSED ARE YOU?: TO SOME EXTENT

## 2022-12-12 SDOH — ECONOMIC STABILITY: INCOME INSECURITY: HOW HARD IS IT FOR YOU TO PAY FOR THE VERY BASICS LIKE FOOD, HOUSING, MEDICAL CARE, AND HEATING?: NOT HARD AT ALL

## 2022-12-12 SDOH — SOCIAL STABILITY: SOCIAL NETWORK: ARE YOU MARRIED, WIDOWED, DIVORCED, SEPARATED, NEVER MARRIED, OR LIVING WITH A PARTNER?: MARRIED

## 2022-12-12 SDOH — SOCIAL STABILITY: SOCIAL NETWORK
IN A TYPICAL WEEK, HOW MANY TIMES DO YOU TALK ON THE PHONE WITH FAMILY, FRIENDS, OR NEIGHBORS?: MORE THAN THREE TIMES A WEEK

## 2022-12-12 SDOH — HEALTH STABILITY: PHYSICAL HEALTH: ON AVERAGE, HOW MANY DAYS PER WEEK DO YOU ENGAGE IN MODERATE TO STRENUOUS EXERCISE (LIKE A BRISK WALK)?: 5 DAYS

## 2022-12-12 SDOH — SOCIAL STABILITY: SOCIAL NETWORK: HOW OFTEN DO YOU ATTENT MEETINGS OF THE CLUB OR ORGANIZATION YOU BELONG TO?: MORE THAN 4 TIMES PER YEAR

## 2022-12-12 SDOH — ECONOMIC STABILITY: TRANSPORTATION INSECURITY
IN THE PAST 12 MONTHS, HAS LACK OF TRANSPORTATION KEPT YOU FROM MEETINGS, WORK, OR FROM GETTING THINGS NEEDED FOR DAILY LIVING?: NO

## 2022-12-12 NOTE — CARE COORDINATION
Ambulatory Care Coordination Note  12/12/2022    ACC: Matti Mcneal RN    Summary Note: 60-year-old male patient of TIA, DVT, hernia MD [place] with PMH of [employer]. RNMIKHAIL spoke with patient via telephone session this date. Patient provided to RN, ACM 2 patient identifiers. Patients confirmed Marion is his wife Moustapha Fleming. Patient has ACP documents on file. RN, ACM introduced patient to Care Coordination services and educated that CC provides additional support, resources and health education to improve patients' health and wellness while teaching self-management skills. Patient verbalized understanding and in agreement with CC enrollment and follow up. 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 Per ED note patient was found to have fluid collection in has abdominal cavity. Patient states at time of pain he had an enlarged hard area in his abdomen, it has since subsided and his pain is manageable at time of ACM outreach. ACM to assist patient with setting up follow up appointments in the community. Patient had questions about staph that his culture was positive for recently, ACM sent educational handouts via My chart for patients use. Patient does not need additional services in the home at this time, he lives with his spouse who helps him if he needs. He is independent with ADL's. PLAN:   Continue to work towards goals, address patient needs, provide CC education, support and care  Call back in 1 week  Continue to assess and execute referrals PRN    Send Plan of Care to PCP . route   Did patient review education sent to him      Offered patient enrollment in the Remote Patient Monitoring (RPM) program for in-home monitoring: Patient is not eligible for RPM program.    Ambulatory Care Coordination Assessment    Care Coordination Protocol  Referral from Primary Care Provider: No  Week 1 - Initial Assessment     Do you have all of your prescriptions and are they filled?: Yes  Barriers to medication adherence: None  Are you able to afford your medications?: Yes  How often do you have trouble taking your medications the way you have been told to take them?: I always take them as prescribed. Do you have Home O2 Therapy?: No      Ability to seek help/take action for Emergent Urgent situations i.e. fire, crime, inclement weather or health crisis. : Independent  Ability to ambulate to restroom: Independent  Ability handle personal hygeine needs (bathing/dressing/grooming): Independent  Ability to manage Medications: Independent  Ability to prepare Food Preparation: Independent  Ability to maintain home (clean home, laundry): Independent  Ability to drive and/or has transportation: Independent  Ability to do shopping: Independent  Ability to manage finances:  Independent  Is patient able to live independently?: Yes     Current Housing: Private Residence        Per the Fall Risk Screening, did the patient have 2 or more falls or 1 fall with injury in the past year?: No     Frequent urination at night?: No  Do you use rails/bars?: No  Do you have a non-slip tub mat?: Yes     Are you experiencing loss of meaning?: No  Are you experiencing loss of hope and peace?: No     Thinking about your patient's physical health needs, are there any symptoms or problems (risk indicators) you are unsure about that require further investigation?: Mild vague physical symptoms or problems; but do not impact on daily life or are not of concern to patient   Are the patients physical health problems impacting on their mental well-being?: Mild impact on mental well-being e.g. \"\"feeling fed-up\"\", \"\"reduced enjoyment\"\"   Are there any problems with your patients lifestyle behaviors (alcohol, drugs, diet, exercise) that are impacting on physical or mental well-being?: No identified areas of concern   Do you have any other concerns about your patients mental well-being? How would you rate their severity and impact on the patient?: No identified areas of concern   How would you rate their home environment in terms of safety and stability (including domestic violence, insecure housing, neighbor harassment)?: Consistently safe, supportive, stable, no identified problems   How do daily activities impact on the patient's well-being? (include current or anticipated unemployment, work, caregiving, access to transportation or other): No identified problems or perceived positive benefits   How would you rate their social network (family, work, friends)?: Good participation with social networks   How would you rate their financial resources (including ability to afford all required medical care)?: Financially secure, resources adequate, no identified problems   How wells does the patient now understand their health and well-being (symptoms, signs or risk factors) and what they need to do to manage their health?: Reasonable to good understanding and already engages in managing health or is willing to undertake better management   How well do you think your patient can engage in healthcare discussions? (Barriers include language, deafness, aphasia, alcohol or drug problems, learning difficulties, concentration): Clear and open communication, no identified barriers   Do other services need to be involved to help this patient?: Other care/services not required at this time   Are current services involved with this patient well-coordinated? (Include coordination with other services you are now recommendation):  All required care/services in place and well-coordinated   Suggested Interventions and [employer]   Other Services or Interventions: Pain Management education sent - AS 12/12/2022   Medi Set or Pill Pack: Completed         Set up/Review an Education Plan, Set up/Review Goals              Prior to Admission medications    Medication Sig Start Date End Date Taking? Authorizing Provider   oxyCODONE (ROXICODONE) 5 MG immediate release tablet Take 1 tablet by mouth every 6 hours as needed for Pain for up to 30 days. 12/13/22 1/12/23  Samantha Burk MD   sulfamethoxazole-trimethoprim (BACTRIM DS) 800-160 MG per tablet Take 1 tablet by mouth 2 times daily for 10 days 12/11/22 12/21/22  Candace Cottrell MD   cephALEXin [facility]) 500 MG capsule Take 1 capsule by mouth 4 times daily for 10 days 12/11/22 12/21/22  Candace Cottrell MD   traZODone (DESYREL) 100 MG tablet TAKE 2 TABLETS BY MOUTH EVERY DAY AT NIGHT 12/8/22   Samantha Burk MD   clopidogrel (PLAVIX) 75 MG tablet TAKE 1 TABLET BY MOUTH EVERY DAY 12/8/22   Maria Luz Cruz MD   FLUoxetine (PROZAC) 20 MG capsule TAKE 1 CAPSULE BY MOUTH EVERY DAY 12/8/22   Samantha Burk MD   Teriparatide, Recombinant, (FORTEO) 600 MCG/2.4ML SOPN injection Inject 0.08 mLs into the skin daily OK to substitute teriparatide Alvogen () 11/23/22   Wayne Adame MD   Insulin Pen Needle (PEN NEEDLES 3/16\") 31G X 5 MM MISC Use one needle for each daily dose. 11/23/22   Wayne Adame MD   dicyclomine (BENTYL) 10 MG capsule Take 1 capsule by mouth every 6 hours as needed (cramps) 11/21/22   Socorro Venegas MD   metoclopramide (REGLAN) 10 MG tablet Take 1 tablet by mouth 4 times daily WARNING:  May cause drowsiness. May impair ability to operate vehicles or machinery. Do not use in combination with alcohol.  11/21/22   Socorro Venegas MD   lipase-protease-amylase (CREON) 11076-46406 units delayed release capsule Take 1 capsule by mouth 3 times daily (with meals) 11/10/22 12/10/22  Samantha Burk MD   cyanocobalamin 1000 MCG/ML injection Inject into the muscle 4/8/21   Historical Provider, MD   atorvastatin (LIPITOR) 40 MG tablet TAKE 1 TABLET BY MOUTH EVERYDAY AT BEDTIME 9/21/22   Samantha Burk MD   pantoprazole (PROTONIX) 40 MG tablet TAKE 1 TABLET BY MOUTH TWICE A DAY 9/21/22   Samantha Burk MD   ondansetron (ZOFRAN ODT) 4 MG disintegrating tablet Take 1 tablet by mouth every 8 hours as needed for Nausea 3/7/22   Morro Shaw MD   gabapentin (NEURONTIN) 600 MG tablet Take 600 mg by mouth 4 times daily.   8/2/21   Historical Provider, MD   sildenafil (REVATIO) 20 MG tablet Take 4 tablets by mouth as needed (30 min prior to intercourse) 1/11/21   Jeffrey Lin MD   calcium-vitamin D (Monroy Pandy) 500-200 MG-UNIT per tablet Take 1 tablet by mouth 2 times daily     Historical Provider, MD   Multiple Vitamin TABS Take 1 tablet by mouth daily     Historical Provider, MD       Future Appointments   Date Time Provider Timothy Beal   12/22/2022  2:00 PM Allyson Sykes MD [facility]   3/21/2023 10:00 AM Roel Preston MD [facility]      and   General Assessment    Do you have any symptoms that are causing concern?: Yes  Reported Symptoms: Abdominal Pain

## 2022-12-21 ENCOUNTER — CARE COORDINATION (OUTPATIENT)
Dept: CARE COORDINATION | Age: 52
End: 2022-12-21

## 2022-12-21 NOTE — CARE COORDINATION
Ambulatory Care Coordination Note  12/21/2022    ACC: Nadeem Wagoner RN    81-year-old male patient of Ervin Wu MD with PMH TIA, DVT, hernia sx . RN, ACM spoke with patient via telephone session this date. Patient provided to RN, ACM 2 patient identifiers. Patients confirmed Marion is his wife [place]. Patient has ACP documents on file. Patient voices no reports of abdominal pain this outreach. He states he is feeling well. Patient reports he has all medications in the home and does not need any refills at this time. He is taking all medications as prescribed. ACM to continue to offer assistance to patient with setting up follow up appointments in the community. Patient does not need additional services in the home at this time, he lives with his spouse who helps him if he needs. He is independent with ADL's. PLAN:   Continue to work towards goals, address patient needs, provide CC education, support and care  Call back in 3 week  Continue to assess and execute referrals PRN        Offered patient enrollment in the Remote Patient Monitoring (RPM) program for in-home monitoring: Patient is not eligible for RPM program.    Lab Results       None            Care Coordination Interventions    Referral from Primary Care Provider: No  Suggested Interventions and Community Resources  Medi Set or Pill Pack: Completed (Comment: patient manages all meds independently - 12/12/2022 AS)  Other Services or Interventions: Pain Management education sent - AS 12/12/2022          Goals Addressed                   This Visit's Progress     Conditions and Symptoms   On track     I will schedule office visits, as directed by my provider. I will notify my provider of any symptoms that indicate a worsening of my condition.     Barriers: time constraints  Plan for overcoming my barriers: I will plan ahead when possible to put my health care needs first  Confidence: 6/10  Anticipated Goal Completion Date: 3/12/2022                Prior to Admission medications    Medication Sig Start Date End Date Taking? Authorizing Provider   oxyCODONE (ROXICODONE) 5 MG immediate release tablet Take 1 tablet by mouth every 6 hours as needed for Pain for up to 30 days. 12/13/22 1/12/23  Cheryl Del Cid MD   sulfamethoxazole-trimethoprim (BACTRIM DS) 800-160 MG per tablet Take 1 tablet by mouth 2 times daily for 10 days 12/11/22 12/21/22  Sade Kimball MD   cephALEXin [facility]) 500 MG capsule Take 1 capsule by mouth 4 times daily for 10 days 12/11/22 12/21/22  Sade Kimball MD   traZODone (DESYREL) 100 MG tablet TAKE 2 TABLETS BY MOUTH EVERY DAY AT NIGHT 12/8/22   Cheryl Del Cid MD   clopidogrel (PLAVIX) 75 MG tablet TAKE 1 TABLET BY MOUTH EVERY DAY 12/8/22   Acacia Ventura MD   FLUoxetine (PROZAC) 20 MG capsule TAKE 1 CAPSULE BY MOUTH EVERY DAY 12/8/22   Cheryl Del Cid MD   Teriparatide, Recombinant, (FORTEO) 600 MCG/2.4ML SOPN injection Inject 0.08 mLs into the skin daily OK to substitute teriparatide Alvogen () 11/23/22   Fazal Ba MD   Insulin Pen Needle (PEN NEEDLES 3/16\") 31G X 5 MM MISC Use one needle for each daily dose. 11/23/22   Fazal Ba MD   dicyclomine (BENTYL) 10 MG capsule Take 1 capsule by mouth every 6 hours as needed (cramps) 11/21/22   Tessa Becker MD   metoclopramide (REGLAN) 10 MG tablet Take 1 tablet by mouth 4 times daily WARNING:  May cause drowsiness. May impair ability to operate vehicles or machinery. Do not use in combination with alcohol.  11/21/22   Tessa Becker MD   lipase-protease-amylase (CREON) 76503-78262 units delayed release capsule Take 1 capsule by mouth 3 times daily (with meals) 11/10/22 12/10/22  Cheryl Del Cid MD   cyanocobalamin 1000 MCG/ML injection Inject into the muscle 4/8/21   Historical Provider, MD   atorvastatin (LIPITOR) 40 MG tablet TAKE 1 TABLET BY MOUTH EVERYDAY AT BEDTIME 9/21/22   Cheryl Del Cid MD   pantoprazole (PROTONIX) 40 MG tablet TAKE 1 TABLET BY MOUTH TWICE A DAY 9/21/22   Daniel Hernandez MD   ondansetron [facility] ODT) 4 MG disintegrating tablet Take 1 tablet by mouth every 8 hours as needed for Nausea 3/7/22   Rosa Snell MD   gabapentin (NEURONTIN) 600 MG tablet Take 600 mg by mouth 4 times daily.   8/2/21   Historical Provider, MD   sildenafil (REVATIO) 20 MG tablet Take 4 tablets by mouth as needed (30 min prior to intercourse) 1/11/21   Daniel Hernandez MD   calcium-vitamin D (Silas Founds) 500-200 MG-UNIT per tablet Take 1 tablet by mouth 2 times daily     Historical Provider, MD   Multiple Vitamin TABS Take 1 tablet by mouth daily     Historical Provider, MD       Future Appointments   Date Time Provider Timothy Beal   12/22/2022  2:00 PM Emelia Osman MD [facility]   3/21/2023 10:00 AM Chepe Green MD [facility] SLE[facility]

## 2022-12-22 ENCOUNTER — OFFICE VISIT (OUTPATIENT)
Dept: SURGERY | Age: 52
End: 2022-12-22

## 2022-12-22 VITALS — BODY MASS INDEX: 28.17 KG/M2 | WEIGHT: 202 LBS

## 2022-12-22 DIAGNOSIS — K43.2 RECURRENT INCISIONAL HERNIA: Primary | ICD-10-CM

## 2022-12-22 PROCEDURE — 99024 POSTOP FOLLOW-UP VISIT: CPT | Performed by: SURGERY

## 2022-12-22 NOTE — PROGRESS NOTES
Subjective:      Patient ID: Otoniel Sanchez is a 47 y.o. male. HPI  S/p excision abdominal wall nodule, repair fascial defect. Doing well. Pain mild except for  one night thought he was having SBO but CT showed small seroma. Swelling has since resolved. Tolerating PO with normal BMs. No apparetn complications    Path  Abdominal wall nodule, excision:   - Portions of benign inflamed subcutaneous tissue with abscess formation   and increased stromal fibrosis. - Negative for malignancy. Review of Systems    Objective:   Physical Exam  Wound healed  Abdomen nontender  Assessment:       Diagnosis Orders   1.  Recurrent incisional hernia                Plan:      Recovering well  Path discussed  No restrictions  Return PRN        Allyson Sykes MD

## 2022-12-28 ENCOUNTER — TELEPHONE (OUTPATIENT)
Dept: CARDIOLOGY CLINIC | Age: 52
End: 2022-12-28

## 2022-12-28 NOTE — TELEPHONE ENCOUNTER
Authorization Request to Hold [facility]    Procedure : Lumbar Epidural Injection    Procedure Date : none listed    Surgeon : none listed    Hold Recommendations : hold Plavix for 7 days prior    Fax Clearance : [phone]

## 2023-01-03 NOTE — TELEPHONE ENCOUNTER
Patient planning to have an epidural injection and requesting to hold Plavix x 7 days    Last office visit 5/6/2022    Hx PFO s/p PFO closure 8/5/2021, CVA

## 2023-01-09 ENCOUNTER — PATIENT MESSAGE (OUTPATIENT)
Dept: FAMILY MEDICINE CLINIC | Age: 53
End: 2023-01-09

## 2023-01-09 DIAGNOSIS — S32.010A CLOSED COMPRESSION FRACTURE OF BODY OF L1 VERTEBRA (HCC): ICD-10-CM

## 2023-01-09 RX ORDER — OXYCODONE HYDROCHLORIDE 5 MG/1
5 TABLET ORAL EVERY 6 HOURS PRN
Qty: 120 TABLET | Refills: 0 | Status: SHIPPED | OUTPATIENT
Start: 2023-01-10 | End: 2023-02-08 | Stop reason: SDUPTHER

## 2023-01-09 NOTE — TELEPHONE ENCOUNTER
From: Claudy Leach  [facility]: 1/9/2023 1:49 PM EST  Subject: Medication request     Good afternoon. Im writing to request a refill of my pain medication, oxycodone 5mg IR, to be sent to [facility]. Their phone number if you need it is [phone]. Thank you and I hope your day has been pleasant so far!      Sonia Horne

## 2023-01-16 ENCOUNTER — TELEPHONE (OUTPATIENT)
Dept: PULMONOLOGY | Age: 53
End: 2023-01-16

## 2023-01-16 NOTE — TELEPHONE ENCOUNTER
Pt LM stating his machine stopped working, showed \"service required\", and had a burning smell when he turned it on after unplugging it and plugging it back in. Pt states he called [facility] and was told to call [facility] to get a new machine. Pt called them to get on recall list, but they don't know when they will get him one. Pt wants to know if there is anything that can be done.     Ph. [phone]

## 2023-01-23 ENCOUNTER — CARE COORDINATION (OUTPATIENT)
Dept: CARE COORDINATION | Age: 53
End: 2023-01-23

## 2023-01-23 NOTE — CARE COORDINATION
50-year-old male patient of Pam Fuller MD with PMH of . RN, ACM spoke with patient via telephone session this date. Patient provided to RN, ACM 2 patient identifiers. Patient has ACM documents on file. Patient denies new orders or medication changes, he denies any s/s of disease exacerbation. Medications reviewed with patient, he has all medications in the home and is taking as prescribed. Patients housing, food, transportation needs are being met at this time and he does not have an immediate need for additional assistance. Patient has met all Care Coordination goals and is able to verbalize and teach back all Care Coordination education. ACM made patient aware he is ready for Care Coordination graduation at this time. Patient in agreement. Patient educated that he can reach out to [facility] if her health status or needs change.

## 2023-01-31 ENCOUNTER — TELEPHONE (OUTPATIENT)
Dept: PULMONOLOGY | Age: 53
End: 2023-01-31

## 2023-01-31 NOTE — TELEPHONE ENCOUNTER
LM for pt letting him know he will need to schedule an appointment with a provider to get a new unit ordered.

## 2023-02-08 RX ORDER — OXYCODONE HYDROCHLORIDE 5 MG/1
5 TABLET ORAL EVERY 6 HOURS PRN
Qty: 120 TABLET | Refills: 0 | Status: SHIPPED | OUTPATIENT
Start: 2023-02-08 | End: 2023-03-10

## 2023-03-08 RX ORDER — PANTOPRAZOLE SODIUM 40 MG/1
TABLET, DELAYED RELEASE ORAL
Qty: 180 TABLET | Refills: 1 | Status: SHIPPED | OUTPATIENT
Start: 2023-03-08

## 2023-03-13 ENCOUNTER — PATIENT MESSAGE (OUTPATIENT)
Dept: FAMILY MEDICINE CLINIC | Age: 53
End: 2023-03-13

## 2023-03-13 DIAGNOSIS — S32.010A CLOSED COMPRESSION FRACTURE OF BODY OF L1 VERTEBRA (HCC): ICD-10-CM

## 2023-03-13 RX ORDER — OXYCODONE HYDROCHLORIDE 5 MG/1
5 TABLET ORAL EVERY 6 HOURS PRN
Qty: 120 TABLET | Refills: 0 | Status: SHIPPED | OUTPATIENT
Start: 2023-03-13 | End: 2023-04-12

## 2023-03-13 NOTE — TELEPHONE ENCOUNTER
From: Jess Richardson  To: Dr. Devon Garcia: 3/13/2023 10:31 AM EDT  Subject: Recent medication question     Good morning, checking in to see if someone has had a chance to review my recent request for a refill of the 5 mg immediate release oxycodone. I am close to weaning myself completely off of it, but I will need a short fill of the medication to complete that process.

## 2023-03-20 DIAGNOSIS — M80.80XD OTHER OSTEOPOROSIS WITH CURRENT PATHOLOGICAL FRACTURE WITH ROUTINE HEALING, SUBSEQUENT ENCOUNTER: Primary | ICD-10-CM

## 2023-03-23 RX ORDER — ATORVASTATIN CALCIUM 40 MG/1
TABLET, FILM COATED ORAL
Qty: 90 TABLET | Refills: 1 | Status: SHIPPED | OUTPATIENT
Start: 2023-03-23

## 2023-03-29 ENCOUNTER — APPOINTMENT (OUTPATIENT)
Dept: CT IMAGING | Age: 53
End: 2023-03-29
Payer: COMMERCIAL

## 2023-03-29 ENCOUNTER — HOSPITAL ENCOUNTER (OUTPATIENT)
Age: 53
Setting detail: OBSERVATION
Discharge: HOME | End: 2023-03-31
Attending: EMERGENCY MEDICINE | Admitting: INTERNAL MEDICINE
Payer: COMMERCIAL

## 2023-03-29 ENCOUNTER — APPOINTMENT (OUTPATIENT)
Dept: GENERAL RADIOLOGY | Age: 53
End: 2023-03-29
Payer: COMMERCIAL

## 2023-03-29 DIAGNOSIS — R51.9 ACUTE NONINTRACTABLE HEADACHE, UNSPECIFIED HEADACHE TYPE: Primary | ICD-10-CM

## 2023-03-29 DIAGNOSIS — Z71.89 GOALS OF CARE, COUNSELING/DISCUSSION: ICD-10-CM

## 2023-03-29 DIAGNOSIS — M62.81 LEFT-SIDED MUSCLE WEAKNESS: ICD-10-CM

## 2023-03-29 LAB
ALBUMIN SERPL-MCNC: 4.1 G/DL (ref 3.4–5)
ALBUMIN/GLOB SERPL: 1.7 {RATIO} (ref 1.1–2.2)
ALP SERPL-CCNC: 104 U/L (ref 40–129)
ALT SERPL-CCNC: 36 U/L (ref 10–40)
ANION GAP SERPL CALCULATED.3IONS-SCNC: 11 MMOL/L (ref 3–16)
AST SERPL-CCNC: 15 U/L (ref 15–37)
BASOPHILS # BLD: 0.1 K/UL (ref 0–0.2)
BASOPHILS NFR BLD: 1.2 %
BILIRUB SERPL-MCNC: 0.4 MG/DL (ref 0–1)
BUN SERPL-MCNC: 10 MG/DL (ref 7–20)
CALCIUM SERPL-MCNC: 9 MG/DL (ref 8.3–10.6)
CHLORIDE SERPL-SCNC: 102 MMOL/L (ref 99–110)
CO2 SERPL-SCNC: 25 MMOL/L (ref 21–32)
CREAT SERPL-MCNC: 1.1 MG/DL (ref 0.9–1.3)
DEPRECATED RDW RBC AUTO: 16.1 % (ref 12.4–15.4)
EOSINOPHIL # BLD: 0.1 K/UL (ref 0–0.6)
EOSINOPHIL NFR BLD: 1.6 %
GFR SERPLBLD CREATININE-BSD FMLA CKD-EPI: >60 ML/MIN/{1.73_M2}
GLUCOSE BLD-MCNC: 88 MG/DL
GLUCOSE BLD-MCNC: 88 MG/DL (ref 70–99)
GLUCOSE SERPL-MCNC: 85 MG/DL (ref 70–99)
HCT VFR BLD AUTO: 39.1 % (ref 40.5–52.5)
HGB BLD-MCNC: 12.7 G/DL (ref 13.5–17.5)
INR PPP: 1.09 (ref 0.84–1.16)
LYMPHOCYTES # BLD: 2.2 K/UL (ref 1–5.1)
LYMPHOCYTES NFR BLD: 30.2 %
MCH RBC QN AUTO: 29.3 PG (ref 26–34)
MCHC RBC AUTO-ENTMCNC: 32.6 G/DL (ref 31–36)
MCV RBC AUTO: 89.9 FL (ref 80–100)
MONOCYTES # BLD: 0.4 K/UL (ref 0–1.3)
MONOCYTES NFR BLD: 5.4 %
NEUTROPHILS # BLD: 4.4 K/UL (ref 1.7–7.7)
NEUTROPHILS NFR BLD: 61.6 %
PERFORMED ON: NORMAL
PLATELET # BLD AUTO: 428 K/UL (ref 135–450)
PMV BLD AUTO: 8 FL (ref 5–10.5)
POTASSIUM SERPL-SCNC: 4.3 MMOL/L (ref 3.5–5.1)
PROT SERPL-MCNC: 6.5 G/DL (ref 6.4–8.2)
PROTHROMBIN TIME: 14.1 SEC (ref 11.5–14.8)
RBC # BLD AUTO: 4.35 M/UL (ref 4.2–5.9)
SODIUM SERPL-SCNC: 138 MMOL/L (ref 136–145)
TROPONIN T SERPL-MCNC: <0.01 NG/ML
WBC # BLD AUTO: 7.2 K/UL (ref 4–11)

## 2023-03-29 PROCEDURE — 6360000002 HC RX W HCPCS

## 2023-03-29 PROCEDURE — 70450 CT HEAD/BRAIN W/O DYE: CPT

## 2023-03-29 PROCEDURE — 6360000002 HC RX W HCPCS: Performed by: EMERGENCY MEDICINE

## 2023-03-29 PROCEDURE — 2500000003 HC RX 250 WO HCPCS: Performed by: EMERGENCY MEDICINE

## 2023-03-29 PROCEDURE — 6360000004 HC RX CONTRAST MEDICATION

## 2023-03-29 PROCEDURE — 93005 ELECTROCARDIOGRAM TRACING: CPT

## 2023-03-29 PROCEDURE — 96375 TX/PRO/DX INJ NEW DRUG ADDON: CPT

## 2023-03-29 PROCEDURE — 96365 THER/PROPH/DIAG IV INF INIT: CPT

## 2023-03-29 PROCEDURE — 80061 LIPID PANEL: CPT

## 2023-03-29 PROCEDURE — 80053 COMPREHEN METABOLIC PANEL: CPT

## 2023-03-29 PROCEDURE — 85025 COMPLETE CBC W/AUTO DIFF WBC: CPT

## 2023-03-29 PROCEDURE — 84484 ASSAY OF TROPONIN QUANT: CPT

## 2023-03-29 PROCEDURE — 2580000003 HC RX 258: Performed by: EMERGENCY MEDICINE

## 2023-03-29 PROCEDURE — 83036 HEMOGLOBIN GLYCOSYLATED A1C: CPT

## 2023-03-29 PROCEDURE — 36415 COLL VENOUS BLD VENIPUNCTURE: CPT

## 2023-03-29 PROCEDURE — 71045 X-RAY EXAM CHEST 1 VIEW: CPT

## 2023-03-29 PROCEDURE — 70498 CT ANGIOGRAPHY NECK: CPT

## 2023-03-29 PROCEDURE — 99285 EMERGENCY DEPT VISIT HI MDM: CPT

## 2023-03-29 PROCEDURE — 85610 PROTHROMBIN TIME: CPT

## 2023-03-29 RX ORDER — OXYBUTYNIN CHLORIDE 5 MG/1
5 TABLET, EXTENDED RELEASE ORAL DAILY
COMMUNITY

## 2023-03-29 RX ORDER — KETAMINE HCL IN NACL, ISO-OSM 100MG/10ML
18 SYRINGE (ML) INJECTION ONCE
Status: COMPLETED | OUTPATIENT
Start: 2023-03-29 | End: 2023-03-29

## 2023-03-29 RX ORDER — KETOROLAC TROMETHAMINE 30 MG/ML
15 INJECTION, SOLUTION INTRAMUSCULAR; INTRAVENOUS ONCE
Status: COMPLETED | OUTPATIENT
Start: 2023-03-29 | End: 2023-03-29

## 2023-03-29 RX ORDER — DIPHENHYDRAMINE HYDROCHLORIDE 50 MG/ML
25 INJECTION INTRAMUSCULAR; INTRAVENOUS ONCE
Status: COMPLETED | OUTPATIENT
Start: 2023-03-29 | End: 2023-03-29

## 2023-03-29 RX ORDER — ONDANSETRON 2 MG/ML
4 INJECTION INTRAMUSCULAR; INTRAVENOUS EVERY 6 HOURS PRN
Status: DISCONTINUED | OUTPATIENT
Start: 2023-03-29 | End: 2023-03-30 | Stop reason: SDUPTHER

## 2023-03-29 RX ORDER — 0.9 % SODIUM CHLORIDE 0.9 %
1000 INTRAVENOUS SOLUTION INTRAVENOUS ONCE
Status: COMPLETED | OUTPATIENT
Start: 2023-03-29 | End: 2023-03-29

## 2023-03-29 RX ORDER — MAGNESIUM SULFATE 1 G/100ML
1000 INJECTION INTRAVENOUS ONCE
Status: COMPLETED | OUTPATIENT
Start: 2023-03-29 | End: 2023-03-29

## 2023-03-29 RX ORDER — PROCHLORPERAZINE EDISYLATE 5 MG/ML
10 INJECTION INTRAMUSCULAR; INTRAVENOUS ONCE
Status: COMPLETED | OUTPATIENT
Start: 2023-03-29 | End: 2023-03-29

## 2023-03-29 RX ADMIN — KETOROLAC TROMETHAMINE 15 MG: 30 INJECTION, SOLUTION INTRAMUSCULAR at 19:48

## 2023-03-29 RX ADMIN — ONDANSETRON 4 MG: 2 INJECTION INTRAMUSCULAR; INTRAVENOUS at 18:26

## 2023-03-29 RX ADMIN — SODIUM CHLORIDE 1000 ML: 9 INJECTION, SOLUTION INTRAVENOUS at 18:30

## 2023-03-29 RX ADMIN — DIPHENHYDRAMINE HYDROCHLORIDE 25 MG: 50 INJECTION, SOLUTION INTRAMUSCULAR; INTRAVENOUS at 18:27

## 2023-03-29 RX ADMIN — Medication 18 MG: at 19:51

## 2023-03-29 RX ADMIN — IOPAMIDOL 75 ML: 755 INJECTION, SOLUTION INTRAVENOUS at 18:17

## 2023-03-29 RX ADMIN — MAGNESIUM SULFATE HEPTAHYDRATE 1000 MG: 1 INJECTION, SOLUTION INTRAVENOUS at 18:31

## 2023-03-29 RX ADMIN — PROCHLORPERAZINE EDISYLATE 10 MG: 5 INJECTION INTRAMUSCULAR; INTRAVENOUS at 18:26

## 2023-03-29 ASSESSMENT — ENCOUNTER SYMPTOMS
NAUSEA: 0
BACK PAIN: 0
EYE PAIN: 0
ABDOMINAL PAIN: 0
COUGH: 0
SHORTNESS OF BREATH: 0
DIARRHEA: 0
RHINORRHEA: 0
SORE THROAT: 0
CONSTIPATION: 0
VOMITING: 0

## 2023-03-29 ASSESSMENT — PAIN SCALES - GENERAL
PAINLEVEL_OUTOF10: 9

## 2023-03-29 ASSESSMENT — PAIN - FUNCTIONAL ASSESSMENT: PAIN_FUNCTIONAL_ASSESSMENT: 0-10

## 2023-03-29 ASSESSMENT — PAIN DESCRIPTION - LOCATION: LOCATION: HEAD

## 2023-03-29 NOTE — ED PROVIDER NOTES
ED Attending Attestation Note    This patient was seen by the advanced practice provider. I personally saw the patient and performed a substantive portion of the visit including all aspects of the medical decision making. Briefly, 47 y.o. male presents with complaint of right-sided headache and left-sided weakness/numbness. 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 Reports the onset of a severe right-sided headache around midnight last night. Patient is a history of chronic migraine and this feels similar. Within an hour following the onset of the headache patient developed worsening of his left-sided weakness and numbness. He reports this often happens in the context of his migraines. He denies fever, chest pain, shortness of breath, nausea, vomiting, diarrhea, abdominal pain. Focused exam:   Gen: 47 y.o. male, NAD  HEENT: NCAT. PERRL. EOMI. Neuro: NIH Stroke Scale  Interval: Baseline  Level of Consciousness (1a): Alert  LOC Questions (1b): Answers both correctly  LOC Commands (1c): Performs both tasks correctly  Best Gaze (2): Normal  Visual (3): No visual loss  Facial Palsy (4): Normal symmetrical movement  Motor Arm, Left (5a): Drift, but does not hit bed  Motor Arm, Right (5b): No drift  Motor Leg, Left (6a): Drift, but does not hit bed  Motor Leg, Right (6b): No drift  Limb Ataxia (7): Absent  Sensory (8): (!) Mild to Moderate  Best Language (9): No aphasia  Dysarthria (10): Normal  Extinction and Inattention (11): No abnormality  Total: 3     MDM:   Patient presents with complaint of right-sided headache and worsening left-sided weakness/numbness. Code stroke was activated given onset of worsening left-sided weakness within the past 24 hours. He is outside of the window, however, for thrombolytics. Will obtain noncontrast head CT and CT angiography of the head and neck.   This should be sufficient to evaluate for the possibility of intracranial hemorrhage, however suspicion for such is lower given that the headache feels very similar to his chronic migraines. If CTA shows any concern for the possibility of an LVO will consider CT perfusion study. However, this may represent recrudescence as opposed to acute ischemic stroke. We will also work to treat the patient's headache symptoms with compazine, benadryl, fluids, and magnesium. Noncontrast head CT nothing acute to my independent interpretation. CTA head and neck shows no evidence of LVO. CRITICAL CARE  I personally saw the patient and independently provided 31 minutes of non-concurrent critical care out of the total shared critical care time provided. This excludes seperately billable procedures. Critical care time was provided for stroke-like symptoms requiring consideration for thrombolytics or endovascular therapy and that required close evaluation and/or intervention with concern for potential patient decompensation. For further details of the patient's emergency department visit, please see the advanced practice provider's documentation. Zora Simmonds, MD     This report has been produced using speech recognition software and may contain errors related to that system including errors in grammar, punctuation, and spelling, as well as words and phrases that may be inappropriate. If there are any questions or concerns please feel free to contact the dictating provider for clarification.        Zora Simmonds, MD  03/29/23 [id number]

## 2023-03-29 NOTE — ED NOTES
Patient remains alert and oreinted. Pain score and patient condition reviewed. No acute distress noted. Report given to RN recieving patient.       Montrell Moore RN  03/29/23 [id number]

## 2023-03-29 NOTE — ED PROVIDER NOTES
[address] [facility]        Pt Name: Janina Diop  MRN: [id number]  [place] 1970  Date of evaluation: 3/29/2023  Provider: LILLIE Gil - ELEAZAR  PCP: Keshav Macdonald MD  Note Started: 5:58 PM EDT 3/29/23       I have seen and evaluated this patient with my supervising physician Neida Jackson MD.      [address] [facility] [place]       Chief Complaint   Patient presents with    Headache     Right sided pain since last night, got worse this morning and noticed balance issues, difficulty walking. HISTORY OF PRESENT ILLNESS: 1 or more Elements     History From: Patient        Social Determinants Significantly Affecting Health : Patient has significant healthcare illiteracy    Chief Complaint: Above    Janina Diop is a 47 y.o. male who presents to the ED with concern for headache to the right side as well as worsening left-sided weakness, balance concerns and blurry vision. Patient has a history of old CVA in 2014. Anticoagulated on Plavix. Last dose this morning. Headache is like a lightening bolt/being stabbed sensation to the right side of the head. Has chronic left-sided weakness however deficits did get worse shortly after headache. Waxing and waning since midnight last night getting worse peaking at roughly 2 hours prior to arrival.      Nursing Notes were all reviewed and agreed with or any disagreements were addressed in the HPI. REVIEW OF SYSTEMS :      Review of Systems   Constitutional:  Negative for chills, diaphoresis and fever. HENT:  Negative for congestion, rhinorrhea and sore throat. Eyes:  Positive for visual disturbance. Negative for pain. Respiratory:  Negative for cough and shortness of breath. Cardiovascular:  Negative for chest pain and leg swelling. Gastrointestinal:  Negative for abdominal pain, constipation, diarrhea, nausea and vomiting.    Genitourinary:  Negative for frequency and No aphasia  Dysarthria (10): Normal  Extinction and Inattention (11): No abnormality  Total: 3    [place] Coma Scale  Eye Opening: Spontaneous  Best Verbal Response: Oriented  Best Motor Response: Obeys commands  Nohemy Coma Scale Score: 15                CIWA Assessment  BP: 126/77  Heart Rate: 59           PHYSICAL EXAM  1 or more Elements     ED Triage Vitals   BP Temp Temp src Pulse Resp SpO2 Height Weight   -- -- -- -- -- -- -- --       Physical Exam  Vitals and nursing note reviewed. Constitutional:       General: He is not in acute distress. Appearance: Normal appearance. He is obese. He is ill-appearing (Chronically). He is not diaphoretic. HENT:      Head: Normocephalic and atraumatic. Right Ear: External ear normal.      Left Ear: External ear normal.      Nose: Nose normal. No congestion or rhinorrhea. Mouth/Throat:      Mouth: Mucous membranes are moist.      Pharynx: Oropharynx is clear. No oropharyngeal exudate or posterior oropharyngeal erythema. Eyes:      General: No scleral icterus. Right eye: No discharge. Left eye: No discharge. Extraocular Movements: Extraocular movements intact. Conjunctiva/sclera: Conjunctivae normal.      Pupils: Pupils are equal, round, and reactive to light. Cardiovascular:      Rate and Rhythm: Normal rate and regular rhythm. Pulses: Normal pulses. Heart sounds: Normal heart sounds. No murmur heard. No friction rub. No gallop. Pulmonary:      Effort: Pulmonary effort is normal. No respiratory distress. Breath sounds: Normal breath sounds. No stridor. No wheezing, rhonchi or rales. Abdominal:      General: Abdomen is flat. Bowel sounds are normal. There is no distension. Palpations: Abdomen is soft. Tenderness: There is no abdominal tenderness. There is no right CVA tenderness, left CVA tenderness or guarding. Musculoskeletal:         General: Normal range of motion.       Cervical back: Normal

## 2023-03-30 ENCOUNTER — APPOINTMENT (OUTPATIENT)
Dept: MRI IMAGING | Age: 53
End: 2023-03-30
Payer: COMMERCIAL

## 2023-03-30 PROBLEM — R29.90 STROKE-LIKE SYMPTOMS: Status: ACTIVE | Noted: 2023-03-30

## 2023-03-30 LAB
ANION GAP SERPL CALCULATED.3IONS-SCNC: 8 MMOL/L (ref 3–16)
BASOPHILS # BLD: 0.1 K/UL (ref 0–0.2)
BASOPHILS NFR BLD: 2.1 %
BUN SERPL-MCNC: 11 MG/DL (ref 7–20)
CALCIUM SERPL-MCNC: 8.3 MG/DL (ref 8.3–10.6)
CHLORIDE SERPL-SCNC: 107 MMOL/L (ref 99–110)
CHOLEST SERPL-MCNC: 104 MG/DL (ref 0–199)
CO2 SERPL-SCNC: 25 MMOL/L (ref 21–32)
CREAT SERPL-MCNC: 1 MG/DL (ref 0.9–1.3)
DEPRECATED RDW RBC AUTO: 16.1 % (ref 12.4–15.4)
EKG ATRIAL RATE: 47 BPM
EKG DIAGNOSIS: NORMAL
EKG P AXIS: 60 DEGREES
EKG P-R INTERVAL: 160 MS
EKG Q-T INTERVAL: 422 MS
EKG QRS DURATION: 74 MS
EKG QTC CALCULATION (BAZETT): 373 MS
EKG R AXIS: 23 DEGREES
EKG T AXIS: 47 DEGREES
EKG VENTRICULAR RATE: 47 BPM
EOSINOPHIL # BLD: 0.2 K/UL (ref 0–0.6)
EOSINOPHIL NFR BLD: 3.2 %
EST. AVERAGE GLUCOSE BLD GHB EST-MCNC: 99.7 MG/DL
GFR SERPLBLD CREATININE-BSD FMLA CKD-EPI: >60 ML/MIN/{1.73_M2}
GLUCOSE SERPL-MCNC: 92 MG/DL (ref 70–99)
HBA1C MFR BLD: 5.1 %
HCT VFR BLD AUTO: 36.2 % (ref 40.5–52.5)
HDLC SERPL-MCNC: 59 MG/DL (ref 40–60)
HGB BLD-MCNC: 12.1 G/DL (ref 13.5–17.5)
LDLC SERPL CALC-MCNC: 34 MG/DL
LV EF: 60 %
LVEF MODALITY: NORMAL
LYMPHOCYTES # BLD: 1.6 K/UL (ref 1–5.1)
LYMPHOCYTES NFR BLD: 28 %
MCH RBC QN AUTO: 29.9 PG (ref 26–34)
MCHC RBC AUTO-ENTMCNC: 33.5 G/DL (ref 31–36)
MCV RBC AUTO: 89.4 FL (ref 80–100)
MONOCYTES # BLD: 0.3 K/UL (ref 0–1.3)
MONOCYTES NFR BLD: 5.9 %
NEUTROPHILS # BLD: 3.4 K/UL (ref 1.7–7.7)
NEUTROPHILS NFR BLD: 60.8 %
PLATELET # BLD AUTO: 412 K/UL (ref 135–450)
PMV BLD AUTO: 7.9 FL (ref 5–10.5)
POTASSIUM SERPL-SCNC: 4.7 MMOL/L (ref 3.5–5.1)
RBC # BLD AUTO: 4.05 M/UL (ref 4.2–5.9)
SODIUM SERPL-SCNC: 140 MMOL/L (ref 136–145)
TRIGL SERPL-MCNC: 57 MG/DL (ref 0–150)
VLDLC SERPL CALC-MCNC: 11 MG/DL
WBC # BLD AUTO: 5.6 K/UL (ref 4–11)

## 2023-03-30 PROCEDURE — 93010 ELECTROCARDIOGRAM REPORT: CPT | Performed by: INTERNAL MEDICINE

## 2023-03-30 PROCEDURE — G0378 HOSPITAL OBSERVATION PER HR: HCPCS

## 2023-03-30 PROCEDURE — 96372 THER/PROPH/DIAG INJ SC/IM: CPT

## 2023-03-30 PROCEDURE — 2500000003 HC RX 250 WO HCPCS: Performed by: INTERNAL MEDICINE

## 2023-03-30 PROCEDURE — 85025 COMPLETE CBC W/AUTO DIFF WBC: CPT

## 2023-03-30 PROCEDURE — 97161 PT EVAL LOW COMPLEX 20 MIN: CPT

## 2023-03-30 PROCEDURE — 80048 BASIC METABOLIC PNL TOTAL CA: CPT

## 2023-03-30 PROCEDURE — 6370000000 HC RX 637 (ALT 250 FOR IP): Performed by: INTERNAL MEDICINE

## 2023-03-30 PROCEDURE — 97530 THERAPEUTIC ACTIVITIES: CPT

## 2023-03-30 PROCEDURE — 93306 TTE W/DOPPLER COMPLETE: CPT

## 2023-03-30 PROCEDURE — 70551 MRI BRAIN STEM W/O DYE: CPT

## 2023-03-30 PROCEDURE — 96376 TX/PRO/DX INJ SAME DRUG ADON: CPT

## 2023-03-30 PROCEDURE — 6360000002 HC RX W HCPCS: Performed by: INTERNAL MEDICINE

## 2023-03-30 RX ORDER — GABAPENTIN 300 MG/1
600 CAPSULE ORAL 4 TIMES DAILY
Status: DISCONTINUED | OUTPATIENT
Start: 2023-03-30 | End: 2023-03-31 | Stop reason: HOSPADM

## 2023-03-30 RX ORDER — KETAMINE HYDROCHLORIDE 50 MG/ML
10 INJECTION, SOLUTION, CONCENTRATE INTRAMUSCULAR; INTRAVENOUS ONCE
Status: COMPLETED | OUTPATIENT
Start: 2023-03-30 | End: 2023-03-30

## 2023-03-30 RX ORDER — OXYCODONE HYDROCHLORIDE AND ACETAMINOPHEN 5; 325 MG/1; MG/1
2 TABLET ORAL EVERY 4 HOURS PRN
Status: DISCONTINUED | OUTPATIENT
Start: 2023-03-30 | End: 2023-03-31

## 2023-03-30 RX ORDER — ATORVASTATIN CALCIUM 40 MG/1
40 TABLET, FILM COATED ORAL NIGHTLY
Status: DISCONTINUED | OUTPATIENT
Start: 2023-03-30 | End: 2023-03-31 | Stop reason: HOSPADM

## 2023-03-30 RX ORDER — POLYETHYLENE GLYCOL 3350 17 G/17G
17 POWDER, FOR SOLUTION ORAL DAILY PRN
Status: DISCONTINUED | OUTPATIENT
Start: 2023-03-30 | End: 2023-03-31 | Stop reason: HOSPADM

## 2023-03-30 RX ORDER — TRAZODONE HYDROCHLORIDE 100 MG/1
200 TABLET ORAL NIGHTLY PRN
Status: DISCONTINUED | OUTPATIENT
Start: 2023-03-30 | End: 2023-03-31 | Stop reason: HOSPADM

## 2023-03-30 RX ORDER — ONDANSETRON 4 MG/1
4 TABLET, ORALLY DISINTEGRATING ORAL EVERY 8 HOURS PRN
Status: DISCONTINUED | OUTPATIENT
Start: 2023-03-30 | End: 2023-03-31 | Stop reason: HOSPADM

## 2023-03-30 RX ORDER — FLUOXETINE HYDROCHLORIDE 20 MG/1
20 CAPSULE ORAL DAILY
Status: DISCONTINUED | OUTPATIENT
Start: 2023-03-30 | End: 2023-03-31 | Stop reason: HOSPADM

## 2023-03-30 RX ORDER — PANTOPRAZOLE SODIUM 40 MG/1
40 TABLET, DELAYED RELEASE ORAL 2 TIMES DAILY
Status: DISCONTINUED | OUTPATIENT
Start: 2023-03-30 | End: 2023-03-31 | Stop reason: HOSPADM

## 2023-03-30 RX ORDER — ACETAMINOPHEN 325 MG/1
650 TABLET ORAL EVERY 4 HOURS PRN
Status: DISCONTINUED | OUTPATIENT
Start: 2023-03-30 | End: 2023-03-31 | Stop reason: HOSPADM

## 2023-03-30 RX ORDER — ASPIRIN 300 MG/1
300 SUPPOSITORY RECTAL DAILY
Status: DISCONTINUED | OUTPATIENT
Start: 2023-03-30 | End: 2023-03-31 | Stop reason: HOSPADM

## 2023-03-30 RX ORDER — KETOROLAC TROMETHAMINE 30 MG/ML
30 INJECTION, SOLUTION INTRAMUSCULAR; INTRAVENOUS ONCE
Status: COMPLETED | OUTPATIENT
Start: 2023-03-30 | End: 2023-03-30

## 2023-03-30 RX ORDER — OXYCODONE HYDROCHLORIDE AND ACETAMINOPHEN 5; 325 MG/1; MG/1
1 TABLET ORAL EVERY 4 HOURS PRN
Status: DISCONTINUED | OUTPATIENT
Start: 2023-03-30 | End: 2023-03-31

## 2023-03-30 RX ORDER — CLOPIDOGREL BISULFATE 75 MG/1
75 TABLET ORAL DAILY
Status: DISCONTINUED | OUTPATIENT
Start: 2023-03-30 | End: 2023-03-31 | Stop reason: HOSPADM

## 2023-03-30 RX ORDER — ONDANSETRON 2 MG/ML
4 INJECTION INTRAMUSCULAR; INTRAVENOUS EVERY 6 HOURS PRN
Status: DISCONTINUED | OUTPATIENT
Start: 2023-03-30 | End: 2023-03-31 | Stop reason: HOSPADM

## 2023-03-30 RX ORDER — KETAMINE HCL IN NACL, ISO-OSM 100MG/10ML
1 SYRINGE (ML) INJECTION EVERY 6 HOURS PRN
Status: DISCONTINUED | OUTPATIENT
Start: 2023-03-30 | End: 2023-03-30 | Stop reason: ALTCHOICE

## 2023-03-30 RX ORDER — ENOXAPARIN SODIUM 100 MG/ML
40 INJECTION SUBCUTANEOUS DAILY
Status: DISCONTINUED | OUTPATIENT
Start: 2023-03-30 | End: 2023-03-31 | Stop reason: HOSPADM

## 2023-03-30 RX ORDER — ASPIRIN 81 MG/1
81 TABLET ORAL DAILY
Status: DISCONTINUED | OUTPATIENT
Start: 2023-03-30 | End: 2023-03-31 | Stop reason: HOSPADM

## 2023-03-30 RX ADMIN — FLUOXETINE 20 MG: 20 CAPSULE ORAL at 10:26

## 2023-03-30 RX ADMIN — OXYCODONE AND ACETAMINOPHEN 2 TABLET: 5; 325 TABLET ORAL at 16:56

## 2023-03-30 RX ADMIN — OXYCODONE AND ACETAMINOPHEN 2 TABLET: 5; 325 TABLET ORAL at 20:38

## 2023-03-30 RX ADMIN — OXYCODONE AND ACETAMINOPHEN 2 TABLET: 5; 325 TABLET ORAL at 13:28

## 2023-03-30 RX ADMIN — KETOROLAC TROMETHAMINE 30 MG: 30 INJECTION, SOLUTION INTRAMUSCULAR at 02:27

## 2023-03-30 RX ADMIN — KETAMINE HYDROCHLORIDE 10 MG: 50 INJECTION INTRAMUSCULAR; INTRAVENOUS at 05:00

## 2023-03-30 RX ADMIN — TRAZODONE HYDROCHLORIDE 200 MG: 100 TABLET ORAL at 22:08

## 2023-03-30 RX ADMIN — GABAPENTIN 600 MG: 300 CAPSULE ORAL at 13:30

## 2023-03-30 RX ADMIN — GABAPENTIN 600 MG: 300 CAPSULE ORAL at 20:24

## 2023-03-30 RX ADMIN — ENOXAPARIN SODIUM 40 MG: 100 INJECTION SUBCUTANEOUS at 10:28

## 2023-03-30 RX ADMIN — PANTOPRAZOLE SODIUM 40 MG: 40 TABLET, DELAYED RELEASE ORAL at 10:26

## 2023-03-30 RX ADMIN — CLOPIDOGREL BISULFATE 75 MG: 75 TABLET ORAL at 10:26

## 2023-03-30 RX ADMIN — ATORVASTATIN CALCIUM 40 MG: 40 TABLET, FILM COATED ORAL at 20:24

## 2023-03-30 RX ADMIN — ASPIRIN 81 MG: 81 TABLET, COATED ORAL at 10:27

## 2023-03-30 RX ADMIN — PANTOPRAZOLE SODIUM 40 MG: 40 TABLET, DELAYED RELEASE ORAL at 20:24

## 2023-03-30 RX ADMIN — GABAPENTIN 600 MG: 300 CAPSULE ORAL at 10:27

## 2023-03-30 RX ADMIN — GABAPENTIN 600 MG: 300 CAPSULE ORAL at 16:56

## 2023-03-30 ASSESSMENT — PAIN SCALES - GENERAL
PAINLEVEL_OUTOF10: 5
PAINLEVEL_OUTOF10: 8
PAINLEVEL_OUTOF10: 9
PAINLEVEL_OUTOF10: 9
PAINLEVEL_OUTOF10: 7
PAINLEVEL_OUTOF10: 0
PAINLEVEL_OUTOF10: 7
PAINLEVEL_OUTOF10: 2
PAINLEVEL_OUTOF10: 7
PAINLEVEL_OUTOF10: 7

## 2023-03-30 ASSESSMENT — PAIN DESCRIPTION - DESCRIPTORS
DESCRIPTORS: THROBBING;SHARP
DESCRIPTORS: THROBBING;SHARP

## 2023-03-30 ASSESSMENT — PAIN DESCRIPTION - LOCATION
LOCATION: HEAD

## 2023-03-30 ASSESSMENT — PAIN SCALES - WONG BAKER: WONGBAKER_NUMERICALRESPONSE: 2

## 2023-03-30 ASSESSMENT — PAIN DESCRIPTION - ORIENTATION
ORIENTATION: POSTERIOR
ORIENTATION: POSTERIOR

## 2023-03-30 NOTE — ED NOTES
ED SBAR report provider to Christian Garcia RN. Patient to be transported to Room 5132 via stretcher by RN. Patient transported with bedside cardiac monitor. IV site clean, dry, and intact. MEWS score and pain assessed and documented. Updated patient on plan of care.      Ana Cristina Alcaraz RN  03/30/23 7125

## 2023-03-30 NOTE — PROGRESS NOTES
Pt arrived to room #5132. Pt orientated to room and call light. NIH scale completed with Alexia Pacheco RN. Neuro at bedside for evaluation.  Electronically signed by Tori Lazo RN on 3/30/2023 at 3:12 PM

## 2023-03-30 NOTE — ED NOTES
Pt return from echo. Pain remains 7/10. Perfect Serve seen to Best Adamson MD. Pt placed back on cardiac monitor.       [address] [facility], RN  03/30/23 [facility] [address]  03/30/23 5321

## 2023-03-30 NOTE — PROGRESS NOTES
arrival.  Response To Previous Treatment: Not applicable  Referring Practitioner: Nimo Castle MD  Referral Date : 03/30/23  Diagnosis: CVA R/O; Ha; L-sided weakness  Follows Commands: Within Functional Limits  Subjective  Subjective: Pt c/o intractable Ha. Weakness in LLE is significantly increase since onset of Ha. Social/Functional History  Social/Functional History  Lives With: Spouse, Parent, Family  Type of Home: House  Home Layout: Two level  Home Access: Stairs to enter with rails  Entrance Stairs - Number of Steps: 3  Bathroom Shower/Tub: Walk-in shower  Bathroom Toilet: Standard  Bathroom Equipment: Shower chair  Home Equipment: [employer], rolling  Has the patient had two or more falls in the past year or any fall with injury in the past year?: No  ADL Assistance: Independent  Ambulation Assistance: Independent  Transfer Assistance: Independent  Active : Yes  Mode of Transportation: Car  Occupation: Self employed    Vision/Hearing  Vision  Vision: Within Functional Limits  Hearing  Hearing: Within functional limits      Cognition   Orientation  Overall Orientation Status: Within Normal Limits     Objective     AROM RLE (degrees)  RLE AROM: WNL  AROM LLE (degrees)  LLE General AROM: Hip Flex mildly limited, knee Flex/Ext WFL. Ankle PF/DF WFL  AROM RUE (degrees)  RUE AROM : WNL  AROM LUE (degrees)  LUE AROM : WFL  LUE General AROM: Shoulder Flex/ABD mildly limited. Elbow flex/Ext WFL.     Strength RLE  Strength RLE: WNL  Strength LLE  Strength LLE: Exception  Comment: Hip Flex 3-/5, Knee FLex/Ext 3+/5, Ankle DF 3/5, PF 2/5  Strength RUE  Strength RUE: WNL  Strength LUE  Strength LUE: WFL  Comment: Shoulder Flex, ABD grossly 3+/5, Elbow Flex/Ext 3+/5     Bed mobility  Supine to Sit: Stand by assistance  Sit to Supine: Stand by assistance    Transfers  Sit to Stand: Contact guard assistance  Stand to Sit: Contact guard assistance    Ambulation  Surface: Level tile  Device: No Device  Assistance: Contact guard assistance  Quality of Gait: Slow speed, c/o pain, difficulty with WB LLE, mildly unsteady. Distance: 48', 25'    AM-PAC Score  AM-PAC Inpatient Mobility Raw Score : 20 (03/30/23 1552)  AM-PAC Inpatient T-Scale Score : 47.67 (03/30/23 1552)  Mobility Inpatient CMS 0-100% Score: 35.83 (03/30/23 1552)  Mobility Inpatient CMS G-Code Modifier : CJ (03/30/23 1552)    Goals  Short Term Goals  Time Frame for Short Term Goals: 2-3 days  Short Term Goal 1: Transfers (I)  Short Term Goal 2: Ambulation 100' (I)  Short Term Goal 3: Ascend/Descend 4 steps (I)  Patient Goals   Patient Goals : To return home     Education  Patient Education  Education Given To: Patient  Education Provided: Role of Therapy;Plan of Care; Fall Prevention Strategies;Transfer Training  Education Method: Demonstration;Verbal  Education Outcome: Continued education needed    Therapy Time   Individual Concurrent Group Co-treatment   Time In 2736         Time Out 1550         Minutes 25         Timed Code Treatment Minutes: 10 Minutes   Low Complexity Evaluation    Alaina Laird PT   Electronically signed by Alaina Laird PT [id number] on 3/30/2023 at 4:00 PM

## 2023-03-30 NOTE — ED NOTES
Pt continuing to complain of headache. Rates 7/10. No orders for prn pain medications.  Perfect Serve sent to Bert Luna MD.      Quyen Cardona RN  03/30/23 7102

## 2023-03-30 NOTE — PROGRESS NOTES
Ketamine 18 mg given per nurse earlier out of 50 mg syringe  32 mg wasted in the pharmacy with witness

## 2023-03-30 NOTE — CARE COORDINATION
noted at this time. Potential Assistance needed at discharge: 1 Clint Tran            Potential DME:  Unknown. Patient expects to discharge to:  Family is unsure, waiting on therapy to see. Plan for transportation at discharge: Family    Financial    Payor: BCBS / Plan: [address] [place] [address] PPO / Product Type: *No Product type* /     Does insurance require precert for SNF: Yes    Potential assistance Purchasing Medications: No  Meds-to-Beds request:        Jade Celis [address], [address] [facility]  [address] [address]  Phone: [phone] Fax: [phone]    [facility] [address] [facility] [phone] Ally Hopkins [phone]  [address] [facility] [address]  Phone: [phone] Fax: [phone]      Notes:    Factors facilitating achievement of predicted outcomes: Caregiver support, Motivated, Pleasant, Has needed Durable Medical Equipment at home, and Knowledge about rehab    Barriers to discharge: None noted at this time. Additional Case Management Notes:   1) Waiting on neurology clearance and therapy recs. 2) Home with [employer] or [employer] as first choices as he has history with these companies per wife. The Plan for Transition of Care is related to the following treatment goals of Left-sided muscle weakness [M62.81]  Goals of care, counseling/discussion [Z71.89]  Stroke-like symptoms [R29.90]  Acute nonintractable headache, unspecified headache type [N34.4]    IF APPLICABLE: The Patient and/or patient representative [facility] and his family were provided with a choice of provider and agrees with the discharge plan. Freedom of choice list with basic dialogue that supports the patient's individualized plan of care/goals and shares the quality data associated with the providers was provided to: Patient Representative   Patient Representative Name: wife.

## 2023-03-30 NOTE — H&P
occlusion with cerebral infarction 2014    slight weakness in left arm    Wears dentures     full set    Wears glasses        Past Surgical History:          Procedure Laterality Date    ABDOMEN SURGERY      gastric bypass    ABDOMEN SURGERY  04/07/2016    repair of recurrent incisional hernia with mesh, removal of old mesh, bilateral component separation    ABDOMINAL EXPLORATION SURGERY  01/11/2016    exp lap, lysis of adhesions, small bowel resection    CARPAL TUNNEL RELEASE      bilat    COLONOSCOPY N/A 05/31/2022    COLONOSCOPY POLYPECTOMY SNARE/COLD BIOPSY performed by Alexander Chan MD at [facility]      ENDOSCOPY, COLON, DIAGNOSTIC      EYE SURGERY Bilateral     Lasik    GASTRIC BYPASS SURGERY  01/2009    Has lost about 200 pounds. HERNIA REPAIR  03/23/2016    ventral    KIDNEY REMOVAL Left 08/01/2018    KNEE ARTHROSCOPY Right 07/11/2013    Dr.Robert WaltersAdelina     KNEE ARTHROSCOPY Right 07/11/2012    RIGHT KNEE ARTHROSCOPY WITH CHONDROPLASTY    KNEE SURGERY Right 02/18/2020    INCISION AND DRAINAGE RIGHT KNEE AND WOULD VAC PLACEMENT performed by Raj Mccallum MD at [place] [address] Right 02/26/2021    INCISION AND DRAINAGE OF RIGHT KNEE HEMATOMA performed by Celeste Collado MD at [place] [address] Right 03/05/2021    INCISION AND DRAINAGE AND CLOSURE RIGHT TOTAL KNEE performed by Celeste Collado MD at [address] [place] [address] Left 03/08/2016    CT biopsy ablasion left kidney    OTHER SURGICAL HISTORY  2016    small intestine 12 inch removed, 2 hernia surgeries, another surgery to drain infection from incision.      REVISION TOTAL KNEE ARTHROPLASTY Right 12/17/2019    RIGHT REVISION TIBIA TOTAL KNEE REPLACEMENT performed by Raj Mccallum MD at [place] [address] Right 01/22/2020    RIGHT KNEE IRRIGATION AND DEBRIDEMENT WITH POLY EXCHANGE performed by Rod Duran MD at [address] [place] [address] skin daily OK to substitute teriparatide Alvogen () 11/23/22   Roel Perez MD   Insulin Pen Needle (PEN NEEDLES 3/16\") 31G X 5 MM MISC Use one needle for each daily dose. 11/23/22   Roel Perez MD   lipase-protease-amylase (CREON) 12334-85922 units delayed release capsule Take 1 capsule by mouth 3 times daily (with meals) 11/10/22 12/10/22  Tricia Price MD   gabapentin (NEURONTIN) 600 MG tablet Take 600 mg by mouth 4 times daily. 8/2/21   Historical Provider, MD   sildenafil (REVATIO) 20 MG tablet Take 4 tablets by mouth as needed (30 min prior to intercourse) 1/11/21   Tricia Price MD   calcium-vitamin D ([place]) 500-200 MG-UNIT per tablet Take 1 tablet by mouth 2 times daily   Patient not taking: Reported on 3/29/2023    Historical Provider, MD   Multiple Vitamin TABS Take 1 tablet by mouth daily     Historical Provider, MD       Allergies:  Nsaids, Prochlorperazine, Valtrex [valacyclovir hcl], Fentanyl, Morphine and related, and Tolmetin    Social History:      The patient currently lives with family. Patient works as a musician and instructor  TOBACCO:   reports that he has been smoking cigarettes. He started smoking about 38 years ago. He has a 12.50 pack-year smoking history. He has never used smokeless tobacco.  ETOH:   reports that he does not currently use alcohol. E-cigarette/Vaping       Questions Responses    E-cigarette/Vaping Use Former User    Start Date     Passive Exposure     Quit Date     Counseling Given Yes    Comments not currently              Family History:       Reviewed and negative in regards to presenting illness/complaint. Problem Relation Age of Onset    Arthritis Mother     Osteoporosis Mother     Cancer Father         Lymphoma    Heart Disease Maternal Uncle     Heart Disease Maternal Uncle     Diabetes Maternal Uncle        REVIEW OF SYSTEMS COMPLETED:   Pertinent positives as noted in the HPI.  All other systems reviewed and

## 2023-03-30 NOTE — PROGRESS NOTES
Medication Reconciliation    List of medications for Sanjuanita Farah is currently taking is complete.      Source of Information:   Epic records  Conversation with patient at bedside    Notes Regarding Home Medications:   Patient received all of their home medications prior to arrival to the emergency department  Oxycodone-pt states he has not taken medication for 3 weeks as he titrated off medication    Denies other otc/herbal use    Pasquale Manning, Pharmacy Intern  3/29/2023 8:23 PM

## 2023-03-31 VITALS
BODY MASS INDEX: 26.27 KG/M2 | HEIGHT: 71 IN | RESPIRATION RATE: 18 BRPM | OXYGEN SATURATION: 96 % | SYSTOLIC BLOOD PRESSURE: 116 MMHG | WEIGHT: 187.61 LBS | DIASTOLIC BLOOD PRESSURE: 66 MMHG | TEMPERATURE: 98 F | HEART RATE: 64 BPM

## 2023-03-31 LAB
ANION GAP SERPL CALCULATED.3IONS-SCNC: 10 MMOL/L (ref 3–16)
BASOPHILS # BLD: 0.1 K/UL (ref 0–0.2)
BASOPHILS NFR BLD: 0.8 %
BUN SERPL-MCNC: 11 MG/DL (ref 7–20)
CALCIUM SERPL-MCNC: 8.1 MG/DL (ref 8.3–10.6)
CHLORIDE SERPL-SCNC: 107 MMOL/L (ref 99–110)
CO2 SERPL-SCNC: 21 MMOL/L (ref 21–32)
CREAT SERPL-MCNC: 1 MG/DL (ref 0.9–1.3)
DEPRECATED RDW RBC AUTO: 15.9 % (ref 12.4–15.4)
EOSINOPHIL # BLD: 0.3 K/UL (ref 0–0.6)
EOSINOPHIL NFR BLD: 3.3 %
GFR SERPLBLD CREATININE-BSD FMLA CKD-EPI: >60 ML/MIN/{1.73_M2}
GLUCOSE SERPL-MCNC: 144 MG/DL (ref 70–99)
HCT VFR BLD AUTO: 37.4 % (ref 40.5–52.5)
HGB BLD-MCNC: 12.1 G/DL (ref 13.5–17.5)
LYMPHOCYTES # BLD: 3.7 K/UL (ref 1–5.1)
LYMPHOCYTES NFR BLD: 39.5 %
MAGNESIUM SERPL-MCNC: 2 MG/DL (ref 1.8–2.4)
MCH RBC QN AUTO: 28.8 PG (ref 26–34)
MCHC RBC AUTO-ENTMCNC: 32.4 G/DL (ref 31–36)
MCV RBC AUTO: 89 FL (ref 80–100)
MONOCYTES # BLD: 0.5 K/UL (ref 0–1.3)
MONOCYTES NFR BLD: 5.5 %
NEUTROPHILS # BLD: 4.7 K/UL (ref 1.7–7.7)
NEUTROPHILS NFR BLD: 50.9 %
PLATELET # BLD AUTO: 424 K/UL (ref 135–450)
PMV BLD AUTO: 8.4 FL (ref 5–10.5)
POTASSIUM SERPL-SCNC: 3.3 MMOL/L (ref 3.5–5.1)
RBC # BLD AUTO: 4.2 M/UL (ref 4.2–5.9)
SODIUM SERPL-SCNC: 138 MMOL/L (ref 136–145)
WBC # BLD AUTO: 9.3 K/UL (ref 4–11)

## 2023-03-31 PROCEDURE — 80048 BASIC METABOLIC PNL TOTAL CA: CPT

## 2023-03-31 PROCEDURE — 85025 COMPLETE CBC W/AUTO DIFF WBC: CPT

## 2023-03-31 PROCEDURE — 6360000002 HC RX W HCPCS: Performed by: INTERNAL MEDICINE

## 2023-03-31 PROCEDURE — 6370000000 HC RX 637 (ALT 250 FOR IP): Performed by: INTERNAL MEDICINE

## 2023-03-31 PROCEDURE — 96372 THER/PROPH/DIAG INJ SC/IM: CPT

## 2023-03-31 PROCEDURE — 83735 ASSAY OF MAGNESIUM: CPT

## 2023-03-31 PROCEDURE — G0378 HOSPITAL OBSERVATION PER HR: HCPCS

## 2023-03-31 PROCEDURE — 36415 COLL VENOUS BLD VENIPUNCTURE: CPT

## 2023-03-31 RX ORDER — METHYLPREDNISOLONE SODIUM SUCCINATE 125 MG/2ML
60 INJECTION, POWDER, LYOPHILIZED, FOR SOLUTION INTRAMUSCULAR; INTRAVENOUS ONCE
Status: DISCONTINUED | OUTPATIENT
Start: 2023-03-31 | End: 2023-03-31 | Stop reason: HOSPADM

## 2023-03-31 RX ADMIN — OXYCODONE AND ACETAMINOPHEN 2 TABLET: 5; 325 TABLET ORAL at 06:21

## 2023-03-31 RX ADMIN — GABAPENTIN 600 MG: 300 CAPSULE ORAL at 10:24

## 2023-03-31 RX ADMIN — ENOXAPARIN SODIUM 40 MG: 100 INJECTION SUBCUTANEOUS at 10:25

## 2023-03-31 RX ADMIN — OXYCODONE AND ACETAMINOPHEN 2 TABLET: 5; 325 TABLET ORAL at 10:25

## 2023-03-31 RX ADMIN — ASPIRIN 81 MG: 81 TABLET, COATED ORAL at 10:24

## 2023-03-31 RX ADMIN — CLOPIDOGREL BISULFATE 75 MG: 75 TABLET ORAL at 10:24

## 2023-03-31 RX ADMIN — PANTOPRAZOLE SODIUM 40 MG: 40 TABLET, DELAYED RELEASE ORAL at 10:24

## 2023-03-31 RX ADMIN — OXYCODONE AND ACETAMINOPHEN 2 TABLET: 5; 325 TABLET ORAL at 02:32

## 2023-03-31 RX ADMIN — GABAPENTIN 600 MG: 300 CAPSULE ORAL at 14:17

## 2023-03-31 RX ADMIN — FLUOXETINE 20 MG: 20 CAPSULE ORAL at 10:26

## 2023-03-31 ASSESSMENT — PAIN SCALES - GENERAL
PAINLEVEL_OUTOF10: 6
PAINLEVEL_OUTOF10: 0
PAINLEVEL_OUTOF10: 7
PAINLEVEL_OUTOF10: 4
PAINLEVEL_OUTOF10: 3
PAINLEVEL_OUTOF10: 7
PAINLEVEL_OUTOF10: 7
PAINLEVEL_OUTOF10: 4
PAINLEVEL_OUTOF10: 7
PAINLEVEL_OUTOF10: 4
PAINLEVEL_OUTOF10: 0
PAINLEVEL_OUTOF10: 3
PAINLEVEL_OUTOF10: 4
PAINLEVEL_OUTOF10: 7
PAINLEVEL_OUTOF10: 8
PAINLEVEL_OUTOF10: 3
PAINLEVEL_OUTOF10: 0
PAINLEVEL_OUTOF10: 0
PAINLEVEL_OUTOF10: 4
PAINLEVEL_OUTOF10: 7
PAINLEVEL_OUTOF10: 7

## 2023-03-31 ASSESSMENT — PAIN DESCRIPTION - LOCATION
LOCATION: HEAD

## 2023-03-31 ASSESSMENT — PAIN DESCRIPTION - FREQUENCY: FREQUENCY: INTERMITTENT

## 2023-03-31 ASSESSMENT — PAIN DESCRIPTION - DESCRIPTORS
DESCRIPTORS: ACHING;THROBBING
DESCRIPTORS: ACHING
DESCRIPTORS: SHARP;THROBBING
DESCRIPTORS: ACHING

## 2023-03-31 ASSESSMENT — PAIN DESCRIPTION - ONSET: ONSET: ON-GOING

## 2023-03-31 ASSESSMENT — PAIN DESCRIPTION - ORIENTATION
ORIENTATION: RIGHT;LEFT
ORIENTATION: POSTERIOR

## 2023-03-31 ASSESSMENT — PAIN DESCRIPTION - PAIN TYPE: TYPE: CHRONIC PAIN

## 2023-03-31 NOTE — CARE COORDINATION
Case Management Discharge Note          Date / Time of Note: 3/31/2023 2:34 PM                  Patient Name: Genaro Cosby   YOB: 1970  Diagnosis: Left-sided muscle weakness [M62.81]  Goals of care, counseling/discussion [Z71.89]  Stroke-like symptoms [R29.90]  Acute nonintractable headache, unspecified headache type [R51.9]   Date / Time: 3/29/2023  5:51 PM    Financial:  Payor: [employer] / Plan: [facility] [place] PPO / Product Type: *No Product type* /      Pharmacy:    Medardo Beard [address] [facility], [address] [place] [address] Drive  [address] [address]  Phone: [phone] Fax: [phone]    [facility] [address] [place] [facility] [place] [phone] Osmar Height [phone]  [facility] [address] [place] [address]  Phone: [phone] Fax: [phone]      Assistance purchasing medications?: Potential Assistance Purchasing Medications: No  Assistance provided by Case Management: None at this time    DISCHARGE Disposition: Home- No Services Needed    Transportation:  Transportation PLAN for discharge: family   Mode of Transport: Private Car    IMM Completed:   Not Indicated    Additional CM Notes:   Discharge order noted. Spoke to patient at bedside. Denied any discharge needs. Family to transport home. The Plan for Transition of Care is related to the following treatment goals of Left-sided muscle weakness [M62.81]  Goals of care, counseling/discussion [Z71.89]  Stroke-like symptoms [R29.90]  Acute nonintractable headache, unspecified headache type [R51.9]    The Patient and/or patient representative Best Serrano and his family were provided with a choice of provider and agrees with the discharge plan Yes    Freedom of choice list was provided with basic dialogue that supports the patient's individualized plan of care/goals and shares the quality data associated with the providers.  Not Indicated    Catalina Pacheco RN  [address] [facility]   Case Management Department  Ph: [phone]

## 2023-03-31 NOTE — PROGRESS NOTES
As this nurse was preparing to administer iv steroids and iv Depacon, pts iv occluded pt refused to allow nurse to place another iv, stated that he did not want the medication and wanted to go home, education provided, information relayed to Lizz Weinberg stated ok for pt to go ahead and go home, discharge instructions given to pt, pt states that he understands all instructions, pts wife at bedside, taking pt home, iv removed without difficulty, pt assisted to car by this nurse without any complications.   Electronically signed by Louis Alonso RN on 3/31/2023 at 3:48 PM

## 2023-03-31 NOTE — PLAN OF CARE
Problem: Discharge Planning  Goal: Discharge to home or other facility with appropriate resources  3/31/2023 1317 by Lian Farooq RN  Outcome: Completed  3/31/2023 1316 by Lian Farooq RN  Outcome: Progressing     Problem: Pain  Goal: Verbalizes/displays adequate comfort level or baseline comfort level  3/31/2023 1317 by Lian Farooq RN  Outcome: Completed  3/31/2023 1316 by Lian Farooq RN  Outcome: Progressing     Problem: Safety - Adult  Goal: Free from fall injury  3/31/2023 1317 by Lian Farooq RN  Outcome: Completed  3/31/2023 1316 by Lian Farooq RN  Outcome: Progressing     Problem: Skin/Tissue Integrity  Goal: Absence of new skin breakdown  Description: 1. Monitor for areas of redness and/or skin breakdown  2. Assess vascular access sites hourly  3. Every 4-6 hours minimum:  Change oxygen saturation probe site  4. Every 4-6 hours:  If on nasal continuous positive airway pressure, respiratory therapy assess nares and determine need for appliance change or resting period.   3/31/2023 1317 by Lian Farooq RN  Outcome: Completed  3/31/2023 1316 by Lian Farooq RN  Outcome: Progressing     Problem: ABCDS Injury Assessment  Goal: Absence of physical injury  3/31/2023 1317 by Lian Farooq RN  Outcome: Completed  3/31/2023 1316 by Lian Farooq RN  Outcome: Progressing

## 2023-03-31 NOTE — PROGRESS NOTES
Neurovascularly intact without any focal sensory/motor deficits. Cranial nerves: II-XII intact, grossly non-focal.  Psychiatric: Alert and oriented, thought content appropriate, normal insight  Capillary Refill: Brisk, 3 seconds, normal   Peripheral Pulses: +2 palpable, equal bilaterally       Labs:   Recent Labs     03/29/23  1814 03/30/23  0810 03/31/23  0453   WBC 7.2 5.6 9.3   HGB 12.7* 12.1* 12.1*   HCT 39.1* 36.2* 37.4*    412 424     Recent Labs     03/29/23  1814 03/30/23  0810 03/31/23  0453    140 138   K 4.3 4.7 3.3*    107 107   CO2 25 25 21   BUN 10 11 11   CREATININE 1.1 1.0 1.0   CALCIUM 9.0 8.3 8.1*     Recent Labs     03/29/23  1814   AST 15   ALT 36   BILITOT 0.4   ALKPHOS 104     Recent Labs     03/29/23  1814   INR 1.09     Recent Labs     03/29/23  1814   TROPONINI <0.01       Urinalysis:      Lab Results   Component Value Date/Time    NITRU Negative 12/11/2022 02:01 AM    WBCUA 14 12/11/2022 02:01 AM    BACTERIA None Seen 12/11/2022 02:01 AM    RBCUA 1 12/11/2022 02:01 AM    BLOODU Negative 12/11/2022 02:01 AM    SPECGRAV 1.010 12/11/2022 02:01 AM    GLUCOSEU Negative 12/11/2022 02:01 AM    GLUCOSEU NEGATIVE 02/07/2011 11:50 AM       Radiology:  MRI brain without contrast   Final Result   Previous area of injury or insult in the posterior right frontal parietal   lobe. Mild chronic small vessel ischemic changes. No acute intracranial   abnormality. XR CHEST PORTABLE   Final Result      Lungs are clear         CTA HEAD NECK W CONTRAST   Final Result   Unremarkable CTA head and neck. No large vessel occlusion. This scan was analyzed using Viz. ai contact LVO. Identification of suspected   findings is not for diagnostic use beyond notification. Viz LVO is limited to   analysis of imaging data and should not be used in-lieu of full patient   evaluation or relied upon to make or confirm diagnosis.          CT HEAD WO CONTRAST   Final Result   Addendum (preliminary) 1

## 2023-03-31 NOTE — PROGRESS NOTES
Occupational Therapy  [facility]    OT order noted. RN at bedside stating that pt is soon to discharge and does not have OT needs. OT will sign off at this time. DOMENIC Oviedo/OT  Therapist was present, directed the patient's care, made skilled judgement, and was responsible for assessment and treatment of the patient.   Electronically signed by Harshad Arnett OT on 3/31/23 at 2:44 PM EDT

## 2023-04-03 ENCOUNTER — CARE COORDINATION (OUTPATIENT)
Dept: CASE MANAGEMENT | Age: 53
End: 2023-04-03

## 2023-04-03 DIAGNOSIS — R29.90 STROKE-LIKE SYMPTOMS: Primary | ICD-10-CM

## 2023-04-03 NOTE — CARE COORDINATION
he goes to pain management. He said that the specialist that he sees is very familiar with it and knows what he is doing. He said no one at the hospital was comfortable or would even try. He was quite disturbed by this. He said he is now trying to suffer through until he can get to his appointment on Thursday. He said he is still having the headache, the balance issues and some neck pain. He did say that his left hand  is not very strong and he drops things when he tries to pick them up. He did say the blurred vision and other visual disturbances are improved. He is eating well. He said he has a medical marijuana card and he uses it and it does help with appetite. He is drinking well also. He said he has all of his medications and is taking them as ordered. Med review completed. He did say that he has not started the Calcium as is supposed to have lab work done to check his values before he resumes that and he wants to be sure it is ok before he does restart it. He denied any bowel or bladder issues. He denied any GI issues. He is not aware of any other issues at this time. Informed/reminded of Care Transitions Coordination purpose (reduction in readmissions), process, future calls, etc and is agreeable with appropriate follow up. Ensured to have CTN contact information to be used as needed. Encouraged to call for new/ongoing issues, questions, concerns, etc.  Encouraged to make contact with PCP as indicated for any further needs as well. Given the opportunity to ask questions and answered these appropriately.   CTN to follow up as indicated in a few days for assessment of new/ongoing symptoms, assessment of risk for readmission, management of self care needs in the home environment, teaching needs as indicated, etc.    Kin Ordaz, RN   Care Transitions Nurse  [phone]

## 2023-04-25 ENCOUNTER — CARE COORDINATION (OUTPATIENT)
Dept: CASE MANAGEMENT | Age: 53
End: 2023-04-25

## 2023-04-25 NOTE — CARE COORDINATION
Home Health  Do you have any needs or concerns that I can assist you with?: No  Identified Barriers: None  Care Transitions Interventions  Other Interventions:             LPN Care Coordinator provided contact information for future needs. No further follow-up call indicated based on severity of symptoms and risk factors.   Plan for next call:  no further outreach    Ivis Marie LPN   [phone]

## 2023-04-29 ENCOUNTER — HOSPITAL ENCOUNTER (EMERGENCY)
Age: 53
Discharge: HOME | End: 2023-04-29
Payer: COMMERCIAL

## 2023-04-29 VITALS
DIASTOLIC BLOOD PRESSURE: 78 MMHG | HEIGHT: 72 IN | RESPIRATION RATE: 14 BRPM | OXYGEN SATURATION: 94 % | SYSTOLIC BLOOD PRESSURE: 114 MMHG | TEMPERATURE: 98.1 F | WEIGHT: 204.15 LBS | HEART RATE: 54 BPM | BODY MASS INDEX: 27.65 KG/M2

## 2023-04-29 DIAGNOSIS — R10.9 CHRONIC ABDOMINAL PAIN: Primary | ICD-10-CM

## 2023-04-29 DIAGNOSIS — G89.29 CHRONIC ABDOMINAL PAIN: Primary | ICD-10-CM

## 2023-04-29 LAB
ANION GAP SERPL CALCULATED.3IONS-SCNC: 6 MMOL/L (ref 3–16)
BASOPHILS # BLD: 0.1 K/UL (ref 0–0.2)
BASOPHILS NFR BLD: 1.4 %
BILIRUB UR QL STRIP.AUTO: NEGATIVE
BUN SERPL-MCNC: 10 MG/DL (ref 7–20)
CALCIUM SERPL-MCNC: 8.9 MG/DL (ref 8.3–10.6)
CHLORIDE SERPL-SCNC: 103 MMOL/L (ref 99–110)
CLARITY UR: CLEAR
CO2 SERPL-SCNC: 28 MMOL/L (ref 21–32)
COLOR UR: YELLOW
CREAT SERPL-MCNC: 1.1 MG/DL (ref 0.9–1.3)
DEPRECATED RDW RBC AUTO: 16.6 % (ref 12.4–15.4)
EOSINOPHIL # BLD: 0.3 K/UL (ref 0–0.6)
EOSINOPHIL NFR BLD: 3.6 %
EPI CELLS #/AREA URNS HPF: NORMAL /HPF (ref 0–5)
GFR SERPLBLD CREATININE-BSD FMLA CKD-EPI: >60 ML/MIN/{1.73_M2}
GLUCOSE SERPL-MCNC: 83 MG/DL (ref 70–99)
GLUCOSE UR STRIP.AUTO-MCNC: NEGATIVE MG/DL
HCT VFR BLD AUTO: 36.1 % (ref 40.5–52.5)
HGB BLD-MCNC: 12.2 G/DL (ref 13.5–17.5)
HGB UR QL STRIP.AUTO: NEGATIVE
KETONES UR STRIP.AUTO-MCNC: NEGATIVE MG/DL
LEUKOCYTE ESTERASE UR QL STRIP.AUTO: ABNORMAL
LIPASE SERPL-CCNC: 34 U/L (ref 13–60)
LYMPHOCYTES # BLD: 3 K/UL (ref 1–5.1)
LYMPHOCYTES NFR BLD: 38.5 %
MCH RBC QN AUTO: 30.4 PG (ref 26–34)
MCHC RBC AUTO-ENTMCNC: 33.8 G/DL (ref 31–36)
MCV RBC AUTO: 89.8 FL (ref 80–100)
MONOCYTES # BLD: 0.4 K/UL (ref 0–1.3)
MONOCYTES NFR BLD: 5.7 %
NEUTROPHILS # BLD: 4 K/UL (ref 1.7–7.7)
NEUTROPHILS NFR BLD: 50.8 %
NITRITE UR QL STRIP.AUTO: NEGATIVE
PH UR STRIP.AUTO: 6.5 [PH] (ref 5–8)
PLATELET # BLD AUTO: 351 K/UL (ref 135–450)
PMV BLD AUTO: 8.1 FL (ref 5–10.5)
POTASSIUM SERPL-SCNC: 4.4 MMOL/L (ref 3.5–5.1)
PROT UR STRIP.AUTO-MCNC: NEGATIVE MG/DL
RBC # BLD AUTO: 4.02 M/UL (ref 4.2–5.9)
RBC #/AREA URNS HPF: NORMAL /HPF (ref 0–4)
SODIUM SERPL-SCNC: 137 MMOL/L (ref 136–145)
SP GR UR STRIP.AUTO: 1.01 (ref 1–1.03)
TRICHOMONAS #/AREA URNS HPF: NORMAL /HPF
UA COMPLETE W REFLEX CULTURE PNL UR: ABNORMAL
UA DIPSTICK W REFLEX MICRO PNL UR: YES
URN SPEC COLLECT METH UR: ABNORMAL
UROBILINOGEN UR STRIP-ACNC: 0.2 E.U./DL
WBC # BLD AUTO: 7.9 K/UL (ref 4–11)
WBC #/AREA URNS HPF: NORMAL /HPF (ref 0–5)

## 2023-04-29 PROCEDURE — 99284 EMERGENCY DEPT VISIT MOD MDM: CPT

## 2023-04-29 PROCEDURE — 96375 TX/PRO/DX INJ NEW DRUG ADDON: CPT

## 2023-04-29 PROCEDURE — 36415 COLL VENOUS BLD VENIPUNCTURE: CPT

## 2023-04-29 PROCEDURE — 96374 THER/PROPH/DIAG INJ IV PUSH: CPT

## 2023-04-29 PROCEDURE — 83690 ASSAY OF LIPASE: CPT

## 2023-04-29 PROCEDURE — 80048 BASIC METABOLIC PNL TOTAL CA: CPT

## 2023-04-29 PROCEDURE — 81001 URINALYSIS AUTO W/SCOPE: CPT

## 2023-04-29 PROCEDURE — 6360000002 HC RX W HCPCS: Performed by: PHYSICIAN ASSISTANT

## 2023-04-29 PROCEDURE — 2580000003 HC RX 258: Performed by: PHYSICIAN ASSISTANT

## 2023-04-29 PROCEDURE — 85025 COMPLETE CBC W/AUTO DIFF WBC: CPT

## 2023-04-29 RX ORDER — 0.9 % SODIUM CHLORIDE 0.9 %
1000 INTRAVENOUS SOLUTION INTRAVENOUS ONCE
Status: COMPLETED | OUTPATIENT
Start: 2023-04-29 | End: 2023-04-29

## 2023-04-29 RX ORDER — OXYCODONE HYDROCHLORIDE AND ACETAMINOPHEN 5; 325 MG/1; MG/1
1 TABLET ORAL EVERY 6 HOURS PRN
Qty: 15 TABLET | Refills: 0 | Status: SHIPPED | OUTPATIENT
Start: 2023-04-29 | End: 2023-05-03

## 2023-04-29 RX ORDER — METOCLOPRAMIDE HYDROCHLORIDE 5 MG/ML
10 INJECTION INTRAMUSCULAR; INTRAVENOUS ONCE
Status: COMPLETED | OUTPATIENT
Start: 2023-04-29 | End: 2023-04-29

## 2023-04-29 RX ADMIN — SODIUM CHLORIDE 1000 ML: 9 INJECTION, SOLUTION INTRAVENOUS at 18:26

## 2023-04-29 RX ADMIN — HYDROMORPHONE HYDROCHLORIDE 1 MG: 1 INJECTION, SOLUTION INTRAMUSCULAR; INTRAVENOUS; SUBCUTANEOUS at 18:55

## 2023-04-29 RX ADMIN — METOCLOPRAMIDE 10 MG: 5 INJECTION, SOLUTION INTRAMUSCULAR; INTRAVENOUS at 18:27

## 2023-04-29 ASSESSMENT — PAIN DESCRIPTION - LOCATION: LOCATION: ABDOMEN

## 2023-04-29 ASSESSMENT — PAIN DESCRIPTION - FREQUENCY: FREQUENCY: CONTINUOUS

## 2023-04-29 ASSESSMENT — PAIN SCALES - GENERAL
PAINLEVEL_OUTOF10: 8
PAINLEVEL_OUTOF10: 8

## 2023-04-29 ASSESSMENT — PAIN DESCRIPTION - DESCRIPTORS: DESCRIPTORS: SHARP

## 2023-04-29 ASSESSMENT — PAIN DESCRIPTION - ORIENTATION: ORIENTATION: LOWER

## 2023-04-29 ASSESSMENT — PAIN - FUNCTIONAL ASSESSMENT: PAIN_FUNCTIONAL_ASSESSMENT: PREVENTS OR INTERFERES WITH ALL ACTIVE AND SOME PASSIVE ACTIVITIES

## 2023-04-29 ASSESSMENT — PAIN DESCRIPTION - PAIN TYPE: TYPE: ACUTE PAIN

## 2023-04-29 ASSESSMENT — PAIN DESCRIPTION - ONSET: ONSET: ON-GOING

## 2023-04-29 NOTE — ED TRIAGE NOTES
Constant abdominal pain w/ nausea, vomiting, and diarrhea x5 days. Unable to keep anything down. No blood in stool or emesis. Significant abdominal surgical hx. Afebrile. Diffuse abdominal tenderness but greater in lower quadrants. Non distended.

## 2023-06-05 RX ORDER — FLUOXETINE HYDROCHLORIDE 20 MG/1
CAPSULE ORAL
Qty: 90 CAPSULE | Refills: 0 | Status: SHIPPED | OUTPATIENT
Start: 2023-06-05

## 2023-06-05 RX ORDER — TRAZODONE HYDROCHLORIDE 100 MG/1
TABLET ORAL
Qty: 180 TABLET | Refills: 0 | Status: SHIPPED | OUTPATIENT
Start: 2023-06-05

## 2023-06-05 NOTE — TELEPHONE ENCOUNTER
Due for follow up with Dr Veena Booker- please call them to schedule at their earliest convenience. (meds have been refilled this time)

## 2023-06-19 RX ORDER — PANTOPRAZOLE SODIUM 40 MG/1
TABLET, DELAYED RELEASE ORAL
Qty: 180 TABLET | Refills: 0 | Status: SHIPPED | OUTPATIENT
Start: 2023-06-19

## 2023-06-19 NOTE — TELEPHONE ENCOUNTER
Due for follow up with Dr Ebonie Porter- please call them to schedule at their earliest convenience. (meds have been refilled this time)

## 2023-06-27 ENCOUNTER — OFFICE VISIT (OUTPATIENT)
Age: 53
End: 2023-06-27

## 2023-06-27 VITALS
BODY MASS INDEX: 27.53 KG/M2 | DIASTOLIC BLOOD PRESSURE: 64 MMHG | TEMPERATURE: 98.1 F | SYSTOLIC BLOOD PRESSURE: 114 MMHG | HEART RATE: 67 BPM | WEIGHT: 203 LBS | OXYGEN SATURATION: 99 %

## 2023-06-27 DIAGNOSIS — M25.572 LEFT ANKLE PAIN, UNSPECIFIED CHRONICITY: ICD-10-CM

## 2023-06-27 DIAGNOSIS — M25.561 RIGHT KNEE PAIN, UNSPECIFIED CHRONICITY: Primary | ICD-10-CM

## 2023-06-27 ASSESSMENT — ENCOUNTER SYMPTOMS: COLOR CHANGE: 0

## 2023-07-03 RX ORDER — ATORVASTATIN CALCIUM 40 MG/1
TABLET, FILM COATED ORAL
Qty: 30 TABLET | Refills: 0 | Status: SHIPPED | OUTPATIENT
Start: 2023-07-03 | End: 2023-08-22

## 2023-07-05 ENCOUNTER — HOSPITAL ENCOUNTER (EMERGENCY)
Age: 53
Discharge: HOME | End: 2023-07-05
Attending: EMERGENCY MEDICINE
Payer: COMMERCIAL

## 2023-07-05 ENCOUNTER — APPOINTMENT (OUTPATIENT)
Dept: CT IMAGING | Age: 53
End: 2023-07-05
Payer: COMMERCIAL

## 2023-07-05 VITALS
BODY MASS INDEX: 28.27 KG/M2 | HEART RATE: 52 BPM | SYSTOLIC BLOOD PRESSURE: 108 MMHG | RESPIRATION RATE: 21 BRPM | DIASTOLIC BLOOD PRESSURE: 69 MMHG | WEIGHT: 201.94 LBS | HEIGHT: 71 IN | OXYGEN SATURATION: 95 % | TEMPERATURE: 98.8 F

## 2023-07-05 DIAGNOSIS — L02.211 ABDOMINAL WALL ABSCESS: Primary | ICD-10-CM

## 2023-07-05 LAB
ALBUMIN SERPL-MCNC: 3.7 G/DL (ref 3.4–5)
ALBUMIN/GLOB SERPL: 1.5 {RATIO} (ref 1.1–2.2)
ALP SERPL-CCNC: 120 U/L (ref 40–129)
ALT SERPL-CCNC: 8 U/L (ref 10–40)
ANION GAP SERPL CALCULATED.3IONS-SCNC: 9 MMOL/L (ref 3–16)
AST SERPL-CCNC: 11 U/L (ref 15–37)
BACTERIA URNS QL MICRO: NORMAL /HPF
BASOPHILS # BLD: 0.1 K/UL (ref 0–0.2)
BASOPHILS NFR BLD: 1 %
BILIRUB SERPL-MCNC: 0.3 MG/DL (ref 0–1)
BILIRUB UR QL STRIP.AUTO: NEGATIVE
BUN SERPL-MCNC: 10 MG/DL (ref 7–20)
CALCIUM SERPL-MCNC: 8.8 MG/DL (ref 8.3–10.6)
CHLORIDE SERPL-SCNC: 107 MMOL/L (ref 99–110)
CLARITY UR: CLEAR
CO2 SERPL-SCNC: 26 MMOL/L (ref 21–32)
COLOR UR: YELLOW
CREAT SERPL-MCNC: 1 MG/DL (ref 0.9–1.3)
DEPRECATED RDW RBC AUTO: 16.6 % (ref 12.4–15.4)
EOSINOPHIL # BLD: 0.4 K/UL (ref 0–0.6)
EOSINOPHIL NFR BLD: 4.7 %
EPI CELLS #/AREA URNS AUTO: 0 /HPF (ref 0–5)
GFR SERPLBLD CREATININE-BSD FMLA CKD-EPI: >60 ML/MIN/{1.73_M2}
GLUCOSE SERPL-MCNC: 94 MG/DL (ref 70–99)
GLUCOSE UR STRIP.AUTO-MCNC: NEGATIVE MG/DL
HCT VFR BLD AUTO: 34.6 % (ref 40.5–52.5)
HGB BLD-MCNC: 11.7 G/DL (ref 13.5–17.5)
HGB UR QL STRIP.AUTO: NEGATIVE
HYALINE CASTS #/AREA URNS AUTO: 1 /LPF (ref 0–8)
KETONES UR STRIP.AUTO-MCNC: ABNORMAL MG/DL
LEUKOCYTE ESTERASE UR QL STRIP.AUTO: ABNORMAL
LIPASE SERPL-CCNC: 25 U/L (ref 13–60)
LYMPHOCYTES # BLD: 2.1 K/UL (ref 1–5.1)
LYMPHOCYTES NFR BLD: 24.1 %
MCH RBC QN AUTO: 29.7 PG (ref 26–34)
MCHC RBC AUTO-ENTMCNC: 33.7 G/DL (ref 31–36)
MCV RBC AUTO: 88.3 FL (ref 80–100)
MONOCYTES # BLD: 0.6 K/UL (ref 0–1.3)
MONOCYTES NFR BLD: 7.1 %
NEUTROPHILS # BLD: 5.5 K/UL (ref 1.7–7.7)
NEUTROPHILS NFR BLD: 63.1 %
NITRITE UR QL STRIP.AUTO: NEGATIVE
PH UR STRIP.AUTO: 6.5 [PH] (ref 5–8)
PLATELET # BLD AUTO: 307 K/UL (ref 135–450)
PMV BLD AUTO: 8.1 FL (ref 5–10.5)
POTASSIUM SERPL-SCNC: 4.3 MMOL/L (ref 3.5–5.1)
PROT SERPL-MCNC: 6.1 G/DL (ref 6.4–8.2)
PROT UR STRIP.AUTO-MCNC: NEGATIVE MG/DL
RBC # BLD AUTO: 3.92 M/UL (ref 4.2–5.9)
RBC CLUMPS #/AREA URNS AUTO: 0 /HPF (ref 0–4)
SODIUM SERPL-SCNC: 142 MMOL/L (ref 136–145)
SP GR UR STRIP.AUTO: 1.02 (ref 1–1.03)
UA COMPLETE W REFLEX CULTURE PNL UR: ABNORMAL
UA DIPSTICK W REFLEX MICRO PNL UR: YES
URN SPEC COLLECT METH UR: ABNORMAL
UROBILINOGEN UR STRIP-ACNC: 1 E.U./DL
WBC # BLD AUTO: 8.7 K/UL (ref 4–11)
WBC #/AREA URNS AUTO: 1 /HPF (ref 0–5)

## 2023-07-05 PROCEDURE — 6360000002 HC RX W HCPCS: Performed by: PHYSICIAN ASSISTANT

## 2023-07-05 PROCEDURE — 87186 SC STD MICRODIL/AGAR DIL: CPT

## 2023-07-05 PROCEDURE — 85025 COMPLETE CBC W/AUTO DIFF WBC: CPT

## 2023-07-05 PROCEDURE — 80053 COMPREHEN METABOLIC PANEL: CPT

## 2023-07-05 PROCEDURE — 96375 TX/PRO/DX INJ NEW DRUG ADDON: CPT

## 2023-07-05 PROCEDURE — 2580000003 HC RX 258: Performed by: PHYSICIAN ASSISTANT

## 2023-07-05 PROCEDURE — 96374 THER/PROPH/DIAG INJ IV PUSH: CPT

## 2023-07-05 PROCEDURE — 10060 I&D ABSCESS SIMPLE/SINGLE: CPT

## 2023-07-05 PROCEDURE — 2500000003 HC RX 250 WO HCPCS: Performed by: PHYSICIAN ASSISTANT

## 2023-07-05 PROCEDURE — 81001 URINALYSIS AUTO W/SCOPE: CPT

## 2023-07-05 PROCEDURE — 83690 ASSAY OF LIPASE: CPT

## 2023-07-05 PROCEDURE — 6360000004 HC RX CONTRAST MEDICATION: Performed by: PHYSICIAN ASSISTANT

## 2023-07-05 PROCEDURE — 99285 EMERGENCY DEPT VISIT HI MDM: CPT

## 2023-07-05 PROCEDURE — 87205 SMEAR GRAM STAIN: CPT

## 2023-07-05 PROCEDURE — 87077 CULTURE AEROBIC IDENTIFY: CPT

## 2023-07-05 PROCEDURE — 87070 CULTURE OTHR SPECIMN AEROBIC: CPT

## 2023-07-05 PROCEDURE — 74177 CT ABD & PELVIS W/CONTRAST: CPT

## 2023-07-05 RX ORDER — LIDOCAINE HYDROCHLORIDE AND EPINEPHRINE 10; 10 MG/ML; UG/ML
20 INJECTION, SOLUTION INFILTRATION; PERINEURAL ONCE
Status: COMPLETED | OUTPATIENT
Start: 2023-07-05 | End: 2023-07-05

## 2023-07-05 RX ORDER — DOXYCYCLINE HYCLATE 100 MG
100 TABLET ORAL 2 TIMES DAILY
Qty: 20 TABLET | Refills: 0 | Status: SHIPPED | OUTPATIENT
Start: 2023-07-05 | End: 2023-07-15

## 2023-07-05 RX ORDER — ONDANSETRON 2 MG/ML
4 INJECTION INTRAMUSCULAR; INTRAVENOUS ONCE
Status: COMPLETED | OUTPATIENT
Start: 2023-07-05 | End: 2023-07-05

## 2023-07-05 RX ORDER — 0.9 % SODIUM CHLORIDE 0.9 %
1000 INTRAVENOUS SOLUTION INTRAVENOUS ONCE
Status: COMPLETED | OUTPATIENT
Start: 2023-07-05 | End: 2023-07-05

## 2023-07-05 RX ORDER — OXYCODONE HYDROCHLORIDE AND ACETAMINOPHEN 5; 325 MG/1; MG/1
1 TABLET ORAL EVERY 6 HOURS PRN
Qty: 10 TABLET | Refills: 0 | Status: SHIPPED | OUTPATIENT
Start: 2023-07-05 | End: 2023-07-06

## 2023-07-05 RX ADMIN — IOPAMIDOL 75 ML: 755 INJECTION, SOLUTION INTRAVENOUS at 18:09

## 2023-07-05 RX ADMIN — HYDROMORPHONE HYDROCHLORIDE 0.5 MG: 1 INJECTION, SOLUTION INTRAMUSCULAR; INTRAVENOUS; SUBCUTANEOUS at 20:17

## 2023-07-05 RX ADMIN — ONDANSETRON 4 MG: 2 INJECTION INTRAMUSCULAR; INTRAVENOUS at 18:00

## 2023-07-05 RX ADMIN — SODIUM CHLORIDE 1000 ML: 9 INJECTION, SOLUTION INTRAVENOUS at 19:30

## 2023-07-05 RX ADMIN — LIDOCAINE HYDROCHLORIDE,EPINEPHRINE BITARTRATE 20 ML: 10; .01 INJECTION, SOLUTION INFILTRATION; PERINEURAL at 21:19

## 2023-07-05 ASSESSMENT — ENCOUNTER SYMPTOMS
BACK PAIN: 0
NAUSEA: 0
ABDOMINAL PAIN: 1
SORE THROAT: 0
VOMITING: 0
COUGH: 0
SHORTNESS OF BREATH: 0
EYE PAIN: 0

## 2023-07-05 ASSESSMENT — PAIN DESCRIPTION - ORIENTATION: ORIENTATION: RIGHT;LOWER

## 2023-07-05 ASSESSMENT — PAIN SCALES - GENERAL
PAINLEVEL_OUTOF10: 8
PAINLEVEL_OUTOF10: 8
PAINLEVEL_OUTOF10: 4

## 2023-07-05 ASSESSMENT — LIFESTYLE VARIABLES
HOW OFTEN DO YOU HAVE A DRINK CONTAINING ALCOHOL: NEVER
HOW MANY STANDARD DRINKS CONTAINING ALCOHOL DO YOU HAVE ON A TYPICAL DAY: PATIENT DOES NOT DRINK

## 2023-07-05 ASSESSMENT — PAIN DESCRIPTION - DESCRIPTORS: DESCRIPTORS: SHARP

## 2023-07-05 ASSESSMENT — PAIN - FUNCTIONAL ASSESSMENT: PAIN_FUNCTIONAL_ASSESSMENT: 0-10

## 2023-07-05 ASSESSMENT — PAIN DESCRIPTION - LOCATION
LOCATION: ABDOMEN
LOCATION: ABDOMEN

## 2023-07-05 NOTE — ED PROVIDER NOTES
ED Attending Attestation Note    This patient was seen by the advanced practice provider. I personally saw the patient and performed a substantive portion of the visit including all aspects of the medical decision making. Briefly, 47 y.o. male w/ numerous prior abdominal surgeries presents with RLQ pain/bulge     Focused exam:   Gen: 47 y.o. male, NAD  HEENT: NCAT. PERRL. EOMI. CV: RRR w/o MRG  Lungs: CTAB. No incr WOB. Abdomen: Soft, right lower quadrant tender subcutaneous mass without overlying erythema or induration, nondistended. No rebound/guarding. MDM:   Hemodynamically stable without evidence of sepsis on vitals. CT Concerning for 3x2 cm abdominal wall abscess. Discussed w/ general surgery; recommended abx and needle aspiration. Fluid collection was successfully drained via needle aspiration. Patient tolerated well. See below for details. He will be discharged with strict return precautions, antibiotics and recommendation to follow-up with surgery.     Incision/Drainage    Date/Time: 7/5/2023 9:58 PM  Performed by: Elgin Abernathy MD  Authorized by: Elgin Abernathy MD     Consent:     Consent obtained:  Verbal    Consent given by:  Patient    Risks discussed:  Bleeding, damage to other organs, pain, incomplete drainage and infection    Alternatives discussed:  No treatment, delayed treatment, alternative treatment and observation  Universal protocol:     Patient identity confirmed:  Verbally with patient  Location:     Type:  Abscess    Location:  Trunk    Trunk location:  Abdomen  Pre-procedure details:     Skin preparation:  Povidone-iodine  Sedation:     Sedation type:  None  Anesthesia:     Anesthesia method:  Local infiltration    Local anesthetic:  Lidocaine 2% WITH epi  Procedure type:     Complexity:  Simple  Procedure details:     Ultrasound guidance: yes      Needle aspiration: yes      Needle size:  18 G    Incision depth:  Subcutaneous    Drainage:  Purulent

## 2023-07-05 NOTE — ED PROVIDER NOTES
[address] [facility] [address] [address]      Pt Name: Gin Jacob  QTP:[id number]  [address] [facility] 1970  Date of evaluation: 7/5/2023  Provider: Valentin Sood PA-C  Note Started: 5:02 PM EDT 7/5/2023    This patient was seen and evaluated by attending physician Dr. Caty Venegas MD        Chief Complaint:    Chief Complaint   Patient presents with    Abdominal Pain     Pt reports a painful lump in his rt lower quadrant. No vomiting. Nursing Notes, Past Medical Hx, Past Surgical Hx, Social Hx, Allergies, and Family Hx were all reviewed and agreed with or any disagreements were addressed in the HPI.    HPI: (Location, Duration, Timing, Severity, Quality, Assoc Sx, Context, Modifying factors)    History From: Patient  Limitations to history : None    Social Determinants Significantly Affecting Health : None    Chief Complaint of right-sided lower abdominal bulging. Patient said he was out this past week camping. He noticed the bulging and it got worse. And today his nephew charged out of the handlebar in the area and the pain got worse. He has not taken anything for pain. He was brought here today by his mom. He only had 1 kidney. Left kidney was removed due to cancer. Denies chest pain, no nausea or vomiting. States that multiple surgeries and multiple scar tissue. No fevers. Denies pain with urination. No gross hematuria. No other complaints.     This is a  47 y.o. male who presents to the emergency room with the above complaint    PastMedical/Surgical History:      Diagnosis Date    Alcoholism ([address])     last drink 2015    Allergic rhinitis, seasonal     Anemia     Arthritis     right knee    Vaughn's esophagus     Benign intracranial hypertension     Cancer ([address])     left kidney    Carpal tunnel syndrome     Chronic pain     Depression     Ear infection 05/06/2022    GERD (gastroesophageal reflux disease)     Headache(784.0)     History

## 2023-07-06 ENCOUNTER — OFFICE VISIT (OUTPATIENT)
Dept: FAMILY MEDICINE CLINIC | Age: 53
End: 2023-07-06
Payer: COMMERCIAL

## 2023-07-06 VITALS
WEIGHT: 203 LBS | SYSTOLIC BLOOD PRESSURE: 98 MMHG | HEART RATE: 66 BPM | DIASTOLIC BLOOD PRESSURE: 64 MMHG | BODY MASS INDEX: 28.31 KG/M2 | TEMPERATURE: 98.2 F

## 2023-07-06 DIAGNOSIS — S30.1XXA ABDOMINAL WALL SEROMA, INITIAL ENCOUNTER: Primary | ICD-10-CM

## 2023-07-06 PROCEDURE — 99213 OFFICE O/P EST LOW 20 MIN: CPT | Performed by: FAMILY MEDICINE

## 2023-07-06 ASSESSMENT — PATIENT HEALTH QUESTIONNAIRE - PHQ9
SUM OF ALL RESPONSES TO PHQ QUESTIONS 1-9: 0
4. FEELING TIRED OR HAVING LITTLE ENERGY: 0
9. THOUGHTS THAT YOU WOULD BE BETTER OFF DEAD, OR OF HURTING YOURSELF: 0
2. FEELING DOWN, DEPRESSED OR HOPELESS: 0
SUM OF ALL RESPONSES TO PHQ QUESTIONS 1-9: 0
SUM OF ALL RESPONSES TO PHQ QUESTIONS 1-9: 0
SUM OF ALL RESPONSES TO PHQ9 QUESTIONS 1 & 2: 0
7. TROUBLE CONCENTRATING ON THINGS, SUCH AS READING THE NEWSPAPER OR WATCHING TELEVISION: 0
5. POOR APPETITE OR OVEREATING: 0
8. MOVING OR SPEAKING SO SLOWLY THAT OTHER PEOPLE COULD HAVE NOTICED. OR THE OPPOSITE, BEING SO FIGETY OR RESTLESS THAT YOU HAVE BEEN MOVING AROUND A LOT MORE THAN USUAL: 0
6. FEELING BAD ABOUT YOURSELF - OR THAT YOU ARE A FAILURE OR HAVE LET YOURSELF OR YOUR FAMILY DOWN: 0
SUM OF ALL RESPONSES TO PHQ QUESTIONS 1-9: 0
10. IF YOU CHECKED OFF ANY PROBLEMS, HOW DIFFICULT HAVE THESE PROBLEMS MADE IT FOR YOU TO DO YOUR WORK, TAKE CARE OF THINGS AT HOME, OR GET ALONG WITH OTHER PEOPLE: 0
1. LITTLE INTEREST OR PLEASURE IN DOING THINGS: 0
3. TROUBLE FALLING OR STAYING ASLEEP: 0

## 2023-07-06 NOTE — ED NOTES
Discharge and education instructions reviewed. Patient verbalized understanding, teach-back successful. Patient denied questions at this time. No acute distress noted. Patient instructed to follow-up as noted - return to emergency department if symptoms worsen. Patient verbalized understanding. Discharged per EDMD with discharge instructions.         Madiha Bowman RN  07/05/23 0912

## 2023-07-06 NOTE — DISCHARGE INSTRUCTIONS
Take prescribed medication as prescribed only  Follow-up with Dr. Juanito Jay general surgeon  Return emergency room for any worsening. This include fever, redness, pain out of ordinary and or swelling.

## 2023-07-06 NOTE — PROGRESS NOTES
extended release tablet Take 1 tablet by mouth daily Yes Historical Provider, MD   clopidogrel (PLAVIX) 75 MG tablet TAKE 1 TABLET BY MOUTH EVERY DAY Yes Alex Franklin MD   Teriparatide, Recombinant, (FORTEO) 600 MCG/2.4ML SOPN injection Inject 0.08 mLs into the skin daily OK to substitute teriparatide Alvogen () Yes Thanh Matamoros MD   Insulin Pen Needle (PEN NEEDLES 3/16\") 31G X 5 MM MISC Use one needle for each daily dose. Yes Thanh Matamoros MD   gabapentin (NEURONTIN) 600 MG tablet Take 1 tablet by mouth 4 times daily. Yes Historical Provider, MD   sildenafil (REVATIO) 20 MG tablet Take 4 tablets by mouth as needed (30 min prior to intercourse) Yes Noy Mix MD   Multiple Vitamin TABS Take 1 tablet by mouth daily  Yes Historical Provider, MD   oxyCODONE-acetaminophen (PERCOCET) 5-325 MG per tablet Take 1 tablet by mouth every 6 hours as needed for Pain for up to 3 days. Max Daily Amount: 4 tablets  Patient not taking: Reported on 7/6/2023  Rhett Lopez PA-C   lipase-protease-amylase (CREON) 61090-03364 units delayed release capsule Take 1 capsule by mouth 3 times daily (with meals)  Noy Mix MD        Social History     Tobacco Use    Smoking status: Every Day     Packs/day: 0.50     Years: 25.00     Pack years: 12.50     Types: Cigarettes     Start date: 1/1/1985    Smokeless tobacco: Never    Tobacco comments:     Patient vapes   Vaping Use    Vaping Use: Former    Substances: Nicotine, THC, Flavoring    Devices: Refillable tank, Pre-filled pod   Substance Use Topics    Alcohol use: Not Currently     Comment: last drink 2015    Drug use: Yes     Types: Marijuana Christy Saner)     [facility]       Review of Systems    Physical Exam  Vitals and nursing note reviewed. Constitutional:       General: He is not in acute distress. Appearance: Normal appearance. He is not ill-appearing. HENT:      Head: Normocephalic and atraumatic.    Pulmonary:      Effort:

## 2023-07-06 NOTE — ED NOTES
Fall risk screening completed. Fall risk bracelet applied to patient. Non-skid socks provided and placed on patient. The fall risk is indicated using  dome light . Based on score, a bed alarm was not indicated. The call light is within the patient's reach, and instructions for use were provided. The bed is in the lowest position with wheels locked. The patient has been advised to notify staff, using the call light, if there is a need to get up or use restroom. The patient verbalized understanding of safety precautions and how to contact staff for assistance.         Abel Greer RN  07/05/23 3673

## 2023-07-08 LAB
BACTERIA SPEC AEROBE CULT: ABNORMAL
GRAM STN SPEC: ABNORMAL
ORGANISM: ABNORMAL

## 2023-07-12 ENCOUNTER — OFFICE VISIT (OUTPATIENT)
Dept: SURGERY | Age: 53
End: 2023-07-12
Payer: COMMERCIAL

## 2023-07-12 VITALS
DIASTOLIC BLOOD PRESSURE: 64 MMHG | WEIGHT: 203 LBS | SYSTOLIC BLOOD PRESSURE: 102 MMHG | BODY MASS INDEX: 28.42 KG/M2 | HEIGHT: 71 IN

## 2023-07-12 DIAGNOSIS — L02.211 ABSCESS OF ABDOMINAL WALL: Primary | ICD-10-CM

## 2023-07-12 PROCEDURE — 99213 OFFICE O/P EST LOW 20 MIN: CPT | Performed by: SURGERY

## 2023-07-12 NOTE — PROGRESS NOTES
Subjective:      Patient ID: Bon Sanches is a 47 y.o. male. HPI  CC: abscess  Was evaluted in ED for abscess abdominal wall. Underwent aspiration and started on doxy. Feels much improved but still some soreness    Review of Systems    Objective:   Physical Exam  RLQ with minimal residual induration. No fluctuance  Assessment:       Diagnosis Orders   1.  Abscess of abdominal wall                Plan:      Nothing further to drain  Finish remainder of abx  Keep site clean and dry  Return PRN        Charu Smith MD

## 2023-07-13 RX ORDER — SILDENAFIL CITRATE 20 MG/1
80 TABLET ORAL PRN
Qty: 30 TABLET | Refills: 5 | Status: SHIPPED | OUTPATIENT
Start: 2023-07-13

## 2023-08-18 ENCOUNTER — HOSPITAL ENCOUNTER (EMERGENCY)
Age: 53
Discharge: HOME | End: 2023-08-19
Attending: EMERGENCY MEDICINE
Payer: COMMERCIAL

## 2023-08-18 DIAGNOSIS — S76.112A QUADRICEPS STRAIN, LEFT, INITIAL ENCOUNTER: ICD-10-CM

## 2023-08-18 DIAGNOSIS — S80.12XA CONTUSION OF LEFT LOWER EXTREMITY, INITIAL ENCOUNTER: ICD-10-CM
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
## 2023-08-18 PROCEDURE — 99283 EMERGENCY DEPT VISIT LOW MDM: CPT

## 2023-08-18 ASSESSMENT — PAIN DESCRIPTION - ONSET: ONSET: SUDDEN

## 2023-08-18 ASSESSMENT — PAIN DESCRIPTION - DESCRIPTORS: DESCRIPTORS: DISCOMFORT

## 2023-08-18 ASSESSMENT — PAIN DESCRIPTION - PAIN TYPE: TYPE: ACUTE PAIN;CHRONIC PAIN

## 2023-08-18 ASSESSMENT — PAIN SCALES - GENERAL: PAINLEVEL_OUTOF10: 8

## 2023-08-18 ASSESSMENT — PAIN DESCRIPTION - LOCATION: LOCATION: HIP;KNEE

## 2023-08-18 ASSESSMENT — PAIN DESCRIPTION - ORIENTATION: ORIENTATION: LEFT

## 2023-08-19 ENCOUNTER — APPOINTMENT (OUTPATIENT)
Dept: GENERAL RADIOLOGY | Age: 53
End: 2023-08-19
Payer: COMMERCIAL

## 2023-08-19 VITALS
SYSTOLIC BLOOD PRESSURE: 117 MMHG | RESPIRATION RATE: 16 BRPM | OXYGEN SATURATION: 96 % | HEIGHT: 71 IN | BODY MASS INDEX: 27.53 KG/M2 | WEIGHT: 196.65 LBS | HEART RATE: 56 BPM | DIASTOLIC BLOOD PRESSURE: 83 MMHG | TEMPERATURE: 98 F

## 2023-08-19 PROCEDURE — 73560 X-RAY EXAM OF KNEE 1 OR 2: CPT

## 2023-08-19 PROCEDURE — 6370000000 HC RX 637 (ALT 250 FOR IP): Performed by: EMERGENCY MEDICINE

## 2023-08-19 PROCEDURE — 73552 X-RAY EXAM OF FEMUR 2/>: CPT

## 2023-08-19 PROCEDURE — 73502 X-RAY EXAM HIP UNI 2-3 VIEWS: CPT

## 2023-08-19 PROCEDURE — 73590 X-RAY EXAM OF LOWER LEG: CPT

## 2023-08-19 RX ORDER — QUETIAPINE FUMARATE 200 MG/1
200 TABLET, FILM COATED ORAL
COMMUNITY

## 2023-08-19 RX ORDER — OXYBUTYNIN CHLORIDE 5 MG/1
5 TABLET ORAL
COMMUNITY
Start: 2023-08-14 | End: 2023-08-19

## 2023-08-19 RX ORDER — OXYCODONE HYDROCHLORIDE 10 MG/1
10 TABLET, FILM COATED, EXTENDED RELEASE ORAL EVERY 12 HOURS
COMMUNITY
Start: 2023-05-31

## 2023-08-19 RX ORDER — ZOLPIDEM TARTRATE 10 MG/1
10 TABLET ORAL
COMMUNITY

## 2023-08-19 RX ORDER — DIPHENOXYLATE HYDROCHLORIDE AND ATROPINE SULFATE 2.5; .025 MG/1; MG/1
TABLET ORAL
COMMUNITY

## 2023-08-19 RX ORDER — OXYCODONE HYDROCHLORIDE 5 MG/1
TABLET ORAL
COMMUNITY
Start: 2023-08-18

## 2023-08-19 RX ORDER — TRAZODONE HYDROCHLORIDE 100 MG/1
200 TABLET ORAL
COMMUNITY
End: 2023-08-19

## 2023-08-19 RX ORDER — DOXYCYCLINE HYCLATE 100 MG
1 TABLET ORAL 2 TIMES DAILY
COMMUNITY

## 2023-08-19 RX ORDER — SUCRALFATE 1 G/1
TABLET ORAL
COMMUNITY

## 2023-08-19 RX ORDER — CYANOCOBALAMIN 1000 UG/ML
INJECTION, SOLUTION INTRAMUSCULAR; SUBCUTANEOUS
COMMUNITY
Start: 2023-06-01

## 2023-08-19 RX ORDER — BACITRACIN ZINC AND POLYMYXIN B SULFATE 500; 1000 [USP'U]/G; [USP'U]/G
OINTMENT TOPICAL ONCE
Status: COMPLETED | OUTPATIENT
Start: 2023-08-19 | End: 2023-08-19

## 2023-08-19 RX ORDER — OXYCODONE HYDROCHLORIDE 5 MG/1
5 TABLET ORAL ONCE
Status: COMPLETED | OUTPATIENT
Start: 2023-08-19 | End: 2023-08-19

## 2023-08-19 RX ADMIN — OXYCODONE HYDROCHLORIDE 5 MG: 5 TABLET ORAL at 01:30

## 2023-08-19 RX ADMIN — BACITRACIN ZINC AND POLYMYXIN B SULFATE: at 03:23

## 2023-08-19 ASSESSMENT — PAIN DESCRIPTION - DESCRIPTORS
DESCRIPTORS: DISCOMFORT
DESCRIPTORS: DISCOMFORT

## 2023-08-19 ASSESSMENT — PAIN SCALES - GENERAL
PAINLEVEL_OUTOF10: 8

## 2023-08-19 ASSESSMENT — PAIN DESCRIPTION - ORIENTATION
ORIENTATION: LEFT
ORIENTATION: LEFT

## 2023-08-19 ASSESSMENT — PAIN - FUNCTIONAL ASSESSMENT
PAIN_FUNCTIONAL_ASSESSMENT: 0-10
PAIN_FUNCTIONAL_ASSESSMENT: ACTIVITIES ARE NOT PREVENTED

## 2023-08-19 ASSESSMENT — PAIN DESCRIPTION - ONSET: ONSET: ON-GOING

## 2023-08-19 ASSESSMENT — PAIN DESCRIPTION - LOCATION
LOCATION: HIP;LEG
LOCATION: KNEE

## 2023-08-19 ASSESSMENT — PAIN DESCRIPTION - PAIN TYPE: TYPE: ACUTE PAIN

## 2023-08-19 NOTE — ED PROVIDER NOTES
Additionally, there is no palpable defect. Abrasion cleaned and dressed with bacitracin      The patient was advised to follow-up with PCP. Discharge instructions including strict return precautions were given to the patient. All questions were addressed. The patient verbalizes understanding and is in agreement with the plan. - Consults:   None     Is this patient to be included in the SEP-1 Core Measure? No Exclusion criteria - the patient is NOT to be included for SEP-1 Core Measure due to: Infection is not suspected    I, Calista Mcallister MD, am the primary clinician of record. During the patient's ED course, the patient was given:  Medications   oxyCODONE (ROXICODONE) immediate release tablet 5 mg (5 mg Oral Given 8/19/23 0130)   bacitracin-polymyxin b (POLYSPORIN) ointment ( Topical Given 8/19/23 0323)        Patient was given prescriptions for the following medications. I counseled the patient as to how to take these medications. Discharge Medication List as of 8/19/2023  3:10 AM          Patient instructed to follow up with:   Silvino rUrutia MD  [address] [place] [phone]            All questions were answered and the patient/family expressed understanding and agreement with the plan. PROCEDURES  None    CRITICAL CARE  N/A    CLINICAL IMPRESSION  1. Fall, initial encounter    2. Contusion of left lower extremity, initial encounter    3. Quadriceps strain, left, initial encounter        DISPOSITION  Decision To Discharge 08/19/2023 02:36:03 AM     Calista Mcallister MD    Note: This chart was created using voice recognition dictation software. Efforts were made by me to ensure accuracy, however some errors may be present due to limitations of this technology and occasionally words are not transcribed correctly.         Calista Mcallister MD  08/19/23 5457

## 2023-08-21 RX ORDER — FLUOXETINE HYDROCHLORIDE 20 MG/1
CAPSULE ORAL
Qty: 90 CAPSULE | Refills: 1 | Status: SHIPPED | OUTPATIENT
Start: 2023-08-21

## 2023-08-21 RX ORDER — TRAZODONE HYDROCHLORIDE 100 MG/1
TABLET ORAL
Qty: 180 TABLET | Refills: 1 | Status: SHIPPED | OUTPATIENT
Start: 2023-08-21

## 2023-08-21 NOTE — TELEPHONE ENCOUNTER
----- Message from Enriqueta Kessler sent at 1/11/2021  3:15 PM EST -----  Subject: Medication Problem    QUESTIONS  Name of Medication? sildenafil (REVATIO) 20 MG tablet  Patient-reported dosage and instructions? As needed  What question or problem do you have with the medication? Was wondering if   he could have a new Rx filled. Preferred Pharmacy? [facility] [address] [facility]  Pharmacy phone number (if available)? [phone]  Additional Information for Provider?   ---------------------------------------------------------------------------  --------------  CALL BACK INFO  What is the best way for the office to contact you? OK to leave message on   voicemail  Preferred Call Back Phone Number? [phone]  ---------------------------------------------------------------------------  --------------  SCRIPT ANSWERS  Relationship to Patient?  [facility] Progress Note      Subjective    Seen at bedside, awake, alert, feels calm, no leg swelling, no dyspnea.         Medications  Current Facility-Administered Medications   Medication Dose Route Frequency Provider Last Rate Last Admin   • traMADol (ULTRAM) tablet 50 mg  50 mg Oral Q6H PRN Celeste Mckeon MD   50 mg at 08/21/23 0119   • insulin lispro (ADMELOG,HumaLOG) - Correction Dose   Subcutaneous TID WC Joseph Atkins MD   2 Units at 08/21/23 0837   • insulin lispro (ADMELOG,HumaLOG) - Correction Dose   Subcutaneous Nightly Joseph Atkins MD   2 Units at 08/20/23 2251   • lidocaine (LIDOCARE) 4 % patch 1 patch  1 patch Transdermal Daily Shayne Arias MD   1 patch at 08/21/23 0838   • lidocaine (LIDOCARE) 4 % patch 1 patch  1 patch Transdermal Daily Sean Martinez MD   1 patch at 08/21/23 0838   • nystatin (MYCOSTATIN) powder   Topical 2 times per day Sean Martinez MD   Given at 08/21/23 0842   • morphine injection 1 mg  1 mg Intravenous Q4H PRN Sean Martinez MD   1 mg at 08/19/23 2135   • acetaminophen (TYLENOL) tablet 650 mg  650 mg Oral Q4H PRN Harley Ansari MD   650 mg at 08/20/23 1359   • heparin (porcine) injection 5,000 Units  5,000 Units Subcutaneous 3 times per day Harley Ansari MD   5,000 Units at 08/21/23 0606   • dextrose 50 % injection 25 g  25 g Intravenous PRN Harley Ansari MD       • dextrose 50 % injection 12.5 g  12.5 g Intravenous PRN Harley Ansari MD       • glucagon (GLUCAGEN) injection 1 mg  1 mg Intramuscular PRN Harley Ansari MD       • dextrose (GLUTOSE) 40 % gel 15 g  15 g Oral PRN Harley Ansari MD       • dextrose (GLUTOSE) 40 % gel 30 g  30 g Oral PRN Harley Ansari MD       • albuterol inhaler 2 puff  2 puff Inhalation Q4H PRN Belkis Carcamo CNP       • furosemide ORAL (LASIX) oral solution 40 mg  40 mg Oral Daily Belkis Carcamo CNP   40 mg at 08/20/23 0856   • levothyroxine (SYNTHROID, LEVOTHROID) tablet 75 mcg  75 mcg Oral  QAM AC Belkis Carcamo CNP   75 mcg at 08/21/23 0606   • mirabegron ER (MYRBETRIQ) 24 hour tablet 50 mg  50 mg Oral Daily Belkis Carcamo CNP   50 mg at 08/21/23 0837   • pantoprazole (PROTONIX) EC tablet 40 mg  40 mg Oral QAM AC CarcamoBelkis ritchie, CNP   40 mg at 08/21/23 0606   • atorvastatin (LIPITOR) tablet 10 mg  10 mg Oral Nightly CarcamoBelkis ritchie CNP   10 mg at 08/20/23 2218   • lidocaine (LIDOCARE) 4 % patch 1 patch  1 patch Transdermal Daily Belkis Carcamo CNP   1 patch at 08/21/23 0838        Vitals:     Vitals with min/max:    Vital Last Value 24 Hour Range   Temperature 99.1 °F (37.3 °C) (08/21/23 0625) Temp  Min: 97 °F (36.1 °C)  Max: 99.2 °F (37.3 °C)   Pulse 87 (08/21/23 0837) Pulse  Min: 86  Max: 100   Respiratory 16 (08/21/23 0600) Resp  Min: 16  Max: 18   Non-Invasive  Blood Pressure 99/61 (08/21/23 0837) BP  Min: 99/61  Max: 115/69   Pulse Oximetry 94 % (08/21/23 0625) SpO2  Min: 93 %  Max: 95 %   Arterial   Blood Pressure   No data recorded       Intake/Output Summary (Last 24 hours) at 8/21/2023 0955  Last data filed at 8/21/2023 0630  Gross per 24 hour   Intake 720 ml   Output 1100 ml   Net -380 ml        Physical Exam  Physical Exam  Vitals and nursing note reviewed.   Constitutional:       General: She is not in acute distress.  HENT:      Head: Normocephalic.      Mouth/Throat:      Pharynx: Oropharynx is clear.      Neck: Normal range of motion.   Eyes:      Pupils: Pupils are equal, round, and reactive to light.   Cardiovascular:      Rate and Rhythm: Normal rate and regular rhythm.      Pulses: Normal pulses.      Heart sounds: Normal heart sounds.   Pulmonary:      Effort: Pulmonary effort is normal.      Breath sounds: Normal breath sounds.   Abdominal:      General: Bowel sounds are normal. There is no distension.      Palpations: Abdomen is soft.      Tenderness: There is no abdominal tenderness.   Musculoskeletal:      Right knee: Swelling present. Decreased range of motion.  Tenderness present.      Right lower leg: No edema.      Left lower leg: No edema.      Comments: Tender swollen right knee  Tender left shoulder    Skin:     General: Skin is warm and dry.      Capillary Refill: Capillary refill takes less than 2 seconds.   Neurological:      General: No focal deficit present.      Mental Status: She is alert and oriented to person, place, and time.   Psychiatric:         Mood and Affect: Mood normal.         Imaging  No results found.    Labs     Recent Labs   Lab 08/21/23  0400 08/19/23  0413 08/18/23  0436   WBC 7.2 6.4 6.5   HCT 30.9* 31.3* 31.5*   HGB 10.0* 10.3* 10.3*    175 171   SODIUM 136 140 138   POTASSIUM 3.7 3.5 3.8   CHLORIDE 103 107 107   CO2 26 28 25   CALCIUM 8.5 8.5 8.5   GLUCOSE 158* 103* 114*   BUN 39* 26* 28*   CREATININE 1.62* 1.36* 1.25*       Assessment and Plan:    #Right knee pain s/p fall/ slip  #Left shoulder pain, chronic in nature per patient  -XR Right Knee above   - XR left shoulder as above  - CT R Knee neg for acute fracture, loosening of hardware              - PT as tolerated, OT to eval              - MSW consulted for new placement upon discharge              - PRN pain medications Lidocaine patch for right knee and back of neck   -tramadol before bed so can sleep without pain (will trial tonight)              - Continue ice therapy    #Cervical spine pain   Ice   Analgesics, tylenol/tramadol   Check CT spine             Elevated creatinine dt CKD III  -Cr 1.25 ->1.36->1.62 (8/21)              - encouraging oral fluid intake     Hypomagnesemia- resolved              - Monitor and replace      Anemia of chronic illness              - Hgb as above     Type 2 diabetes mellitus              - hold home metformin   - cover w/ SSIs      Primary HTN              - SBP 100s past 24h              - no home meds noted, monitor     HLD- continue home atorvastatin     GERD - continue home protonix     Urinary retention  -pt has chronic jones for the  past 9 years with every 3 week exchanges  -exchanged on 8/17 by ED              - continue w/ jones              - bladder scan PRN     DIET: CCM   DVT PROPHYLAXIS: Heparin / SCD  CODE STATUS:   Code Status: Full Resuscitation    Disposition:  Pending placement.     BASHIR: TBD    Primary Care Physician  Saman Solorzano MD    All patient questions answered  All labs and imaging reviewed    Note to the patient:  the 21st Century Cures Act makes medical notes like these available to patients in the interest of transparency. Please be advised that this is a medical document. Medical documents are intended to carry relevant information, facts as evident, and the clinical opinion of the practitioner. The medical note is intended as peer to peer communication, and may appear blunt or direct. It is written in medical language, and may contain abbreviations or verbiage that are unfamiliar.    Charting performed by chaya Jorge for Dr. Anabel Bowman.     All medical record entries made by the lmibe were at my direction. I have reviewed the chart and agree that the record accurately reflects my personal performance of the history, physical exam, hospital course, and assessment and plan.

## 2023-08-22 RX ORDER — ATORVASTATIN CALCIUM 40 MG/1
TABLET, FILM COATED ORAL
Qty: 30 TABLET | Refills: 5 | Status: SHIPPED | OUTPATIENT
Start: 2023-08-22

## 2023-10-22 ENCOUNTER — APPOINTMENT (OUTPATIENT)
Dept: GENERAL RADIOLOGY | Age: 53
End: 2023-10-22
Attending: EMERGENCY MEDICINE
Payer: COMMERCIAL

## 2023-10-22 ENCOUNTER — HOSPITAL ENCOUNTER (EMERGENCY)
Age: 53
Discharge: HOME | End: 2023-10-22
Attending: EMERGENCY MEDICINE
Payer: COMMERCIAL

## 2023-10-22 VITALS
OXYGEN SATURATION: 97 % | SYSTOLIC BLOOD PRESSURE: 123 MMHG | TEMPERATURE: 98.2 F | RESPIRATION RATE: 16 BRPM | BODY MASS INDEX: 28.4 KG/M2 | HEART RATE: 71 BPM | WEIGHT: 202.82 LBS | DIASTOLIC BLOOD PRESSURE: 75 MMHG | HEIGHT: 71 IN

## 2023-10-22 DIAGNOSIS — S22.41XA CLOSED FRACTURE OF MULTIPLE RIBS OF RIGHT SIDE, INITIAL ENCOUNTER: Primary | ICD-10-CM

## 2023-10-22 PROCEDURE — 6370000000 HC RX 637 (ALT 250 FOR IP): Performed by: EMERGENCY MEDICINE

## 2023-10-22 PROCEDURE — 71101 X-RAY EXAM UNILAT RIBS/CHEST: CPT

## 2023-10-22 PROCEDURE — 99284 EMERGENCY DEPT VISIT MOD MDM: CPT

## 2023-10-22 PROCEDURE — 93005 ELECTROCARDIOGRAM TRACING: CPT | Performed by: EMERGENCY MEDICINE

## 2023-10-22 RX ORDER — OXYCODONE HYDROCHLORIDE AND ACETAMINOPHEN 5; 325 MG/1; MG/1
1 TABLET ORAL EVERY 6 HOURS PRN
Qty: 20 TABLET | Refills: 0 | Status: SHIPPED | OUTPATIENT
Start: 2023-10-22 | End: 2023-10-26 | Stop reason: ALTCHOICE

## 2023-10-22 RX ORDER — OXYCODONE HYDROCHLORIDE AND ACETAMINOPHEN 5; 325 MG/1; MG/1
1 TABLET ORAL ONCE
Status: COMPLETED | OUTPATIENT
Start: 2023-10-22 | End: 2023-10-22

## 2023-10-22 RX ADMIN — OXYCODONE HYDROCHLORIDE AND ACETAMINOPHEN 1 TABLET: 5; 325 TABLET ORAL at 18:01

## 2023-10-22 ASSESSMENT — PAIN SCALES - GENERAL
PAINLEVEL_OUTOF10: 10
PAINLEVEL_OUTOF10: 10

## 2023-10-22 NOTE — DISCHARGE INSTRUCTIONS
You have been administered medications in the emergency department that may cause drowsiness. Do not be driving, operating heavy machinery, swimming, caring for small children, or engaging in dangerous activities of any type until these medications have worn off. This may be as long as 6-8 hours. You have been prescribed medications that may cause drowsiness. Do not be driving, operating heavy machinery, swimming, caring for small children, or engaging in dangerous activities of any type until these medications have worn off. This may be as long as 6-8 hours. Do not take Tylenol (acetaminophen) with these medications. Do not take Motrin (ibuprofen) or Naprosyn (naproxen) for pain because this will slow the healing of broken bones.

## 2023-10-23 LAB
EKG ATRIAL RATE: 73 BPM
EKG DIAGNOSIS: NORMAL
EKG P AXIS: 67 DEGREES
EKG P-R INTERVAL: 158 MS
EKG Q-T INTERVAL: 372 MS
EKG QRS DURATION: 86 MS
EKG QTC CALCULATION (BAZETT): 409 MS
EKG R AXIS: -16 DEGREES
EKG T AXIS: 49 DEGREES
EKG VENTRICULAR RATE: 73 BPM

## 2023-10-23 PROCEDURE — 93010 ELECTROCARDIOGRAM REPORT: CPT | Performed by: INTERNAL MEDICINE

## 2023-10-26 ENCOUNTER — OFFICE VISIT (OUTPATIENT)
Dept: FAMILY MEDICINE CLINIC | Age: 53
End: 2023-10-26
Payer: COMMERCIAL

## 2023-10-26 ENCOUNTER — TELEPHONE (OUTPATIENT)
Dept: FAMILY MEDICINE CLINIC | Age: 53
End: 2023-10-26

## 2023-10-26 ENCOUNTER — HOSPITAL ENCOUNTER (OUTPATIENT)
Dept: CT IMAGING | Age: 53
Discharge: HOME | End: 2023-10-26
Payer: COMMERCIAL

## 2023-10-26 ENCOUNTER — HOSPITAL ENCOUNTER (OUTPATIENT)
Age: 53
Discharge: HOME | End: 2023-10-26
Payer: COMMERCIAL

## 2023-10-26 VITALS
DIASTOLIC BLOOD PRESSURE: 60 MMHG | HEART RATE: 58 BPM | BODY MASS INDEX: 26.6 KG/M2 | TEMPERATURE: 98 F | OXYGEN SATURATION: 97 % | HEIGHT: 71 IN | WEIGHT: 190 LBS | SYSTOLIC BLOOD PRESSURE: 108 MMHG | RESPIRATION RATE: 16 BRPM

## 2023-10-26 DIAGNOSIS — R06.09 DYSPNEA ON EXERTION: ICD-10-CM

## 2023-10-26 DIAGNOSIS — S22.41XA CLOSED FRACTURE OF MULTIPLE RIBS OF RIGHT SIDE, INITIAL ENCOUNTER: ICD-10-CM

## 2023-10-26 DIAGNOSIS — R06.09 DYSPNEA ON EXERTION: Primary | ICD-10-CM

## 2023-10-26 LAB
ANION GAP SERPL CALCULATED.3IONS-SCNC: 9 MMOL/L (ref 3–16)
BASOPHILS # BLD: 0.1 K/UL (ref 0–0.2)
BASOPHILS NFR BLD: 1.3 %
BUN SERPL-MCNC: 9 MG/DL (ref 7–20)
CALCIUM SERPL-MCNC: 9.5 MG/DL (ref 8.3–10.6)
CHLORIDE SERPL-SCNC: 100 MMOL/L (ref 99–110)
CO2 SERPL-SCNC: 28 MMOL/L (ref 21–32)
CREAT SERPL-MCNC: 1.1 MG/DL (ref 0.9–1.3)
DEPRECATED RDW RBC AUTO: 15.7 % (ref 12.4–15.4)
EOSINOPHIL # BLD: 0.2 K/UL (ref 0–0.6)
EOSINOPHIL NFR BLD: 3.5 %
GFR SERPLBLD CREATININE-BSD FMLA CKD-EPI: >60 ML/MIN/{1.73_M2}
GLUCOSE SERPL-MCNC: 88 MG/DL (ref 70–99)
HCT VFR BLD AUTO: 39.2 % (ref 40.5–52.5)
HGB BLD-MCNC: 13.2 G/DL (ref 13.5–17.5)
LYMPHOCYTES # BLD: 2.5 K/UL (ref 1–5.1)
LYMPHOCYTES NFR BLD: 35.2 %
Lab: NORMAL
MCH RBC QN AUTO: 29.4 PG (ref 26–34)
MCHC RBC AUTO-ENTMCNC: 33.7 G/DL (ref 31–36)
MCV RBC AUTO: 87.3 FL (ref 80–100)
MONOCYTES # BLD: 0.5 K/UL (ref 0–1.3)
MONOCYTES NFR BLD: 7.8 %
NEUTROPHILS # BLD: 3.7 K/UL (ref 1.7–7.7)
NEUTROPHILS NFR BLD: 52.2 %
PERFORMING INSTRUMENT: NORMAL
PLATELET # BLD AUTO: 335 K/UL (ref 135–450)
PLATELET BLD QL SMEAR: ADEQUATE
PMV BLD AUTO: 8.5 FL (ref 5–10.5)
POTASSIUM SERPL-SCNC: 4.3 MMOL/L (ref 3.5–5.1)
QC PASS/FAIL: NORMAL
RBC # BLD AUTO: 4.5 M/UL (ref 4.2–5.9)
SARS-COV-2, POC: NORMAL
SLIDE REVIEW: ABNORMAL
SODIUM SERPL-SCNC: 137 MMOL/L (ref 136–145)
WBC # BLD AUTO: 7 K/UL (ref 4–11)

## 2023-10-26 PROCEDURE — 6360000004 HC RX CONTRAST MEDICATION: Performed by: NURSE PRACTITIONER

## 2023-10-26 PROCEDURE — 87426 SARSCOV CORONAVIRUS AG IA: CPT | Performed by: NURSE PRACTITIONER

## 2023-10-26 PROCEDURE — 80048 BASIC METABOLIC PNL TOTAL CA: CPT

## 2023-10-26 PROCEDURE — 85025 COMPLETE CBC W/AUTO DIFF WBC: CPT

## 2023-10-26 PROCEDURE — 36415 COLL VENOUS BLD VENIPUNCTURE: CPT

## 2023-10-26 PROCEDURE — 99214 OFFICE O/P EST MOD 30 MIN: CPT | Performed by: NURSE PRACTITIONER

## 2023-10-26 PROCEDURE — 71260 CT THORAX DX C+: CPT

## 2023-10-26 RX ORDER — OXYCODONE HYDROCHLORIDE 5 MG/1
5 TABLET ORAL EVERY 8 HOURS PRN
Qty: 21 TABLET | Refills: 0 | Status: SHIPPED | OUTPATIENT
Start: 2023-10-26 | End: 2023-10-26 | Stop reason: SDUPTHER

## 2023-10-26 RX ORDER — OXYCODONE HYDROCHLORIDE 5 MG/1
5 TABLET ORAL EVERY 8 HOURS PRN
Qty: 21 TABLET | Refills: 0 | Status: SHIPPED | OUTPATIENT
Start: 2023-10-26 | End: 2023-11-02

## 2023-10-26 RX ORDER — LIDOCAINE 4 G/G
1 PATCH TOPICAL DAILY
Qty: 30 PATCH | Refills: 0 | Status: SHIPPED | OUTPATIENT
Start: 2023-10-26 | End: 2023-11-25

## 2023-10-26 RX ADMIN — IOPAMIDOL 75 ML: 755 INJECTION, SOLUTION INTRAVENOUS at 14:01

## 2023-10-26 ASSESSMENT — ENCOUNTER SYMPTOMS
COUGH: 1
VOMITING: 0
DIARRHEA: 0
BLOOD IN STOOL: 0
NAUSEA: 0
SHORTNESS OF BREATH: 1
ABDOMINAL PAIN: 0
WHEEZING: 0

## 2023-10-26 NOTE — TELEPHONE ENCOUNTER
Pt missed ED FU today   PT is having lots of SOB having trouble breathing he is coughing up some pflem yellowish  has lots of pain in the ribs       Transfer to [facility] for triage

## 2023-10-26 NOTE — PROGRESS NOTES
about 1 week (around 11/2/2023), or if symptoms worsen or fail to improve, for rib fx with Dr. Mg Al .

## 2023-10-26 NOTE — TELEPHONE ENCOUNTER
Med has been cancelled.  The patient is aware to  the med at [address]

## 2023-10-26 NOTE — TELEPHONE ENCOUNTER
Pt seem today needs prescription sent to [facility] retail pharmacy   [facility] did not have pain medication in stock

## 2023-10-31 ENCOUNTER — OFFICE VISIT (OUTPATIENT)
Dept: FAMILY MEDICINE CLINIC | Age: 53
End: 2023-10-31
Payer: COMMERCIAL

## 2023-10-31 ENCOUNTER — PATIENT MESSAGE (OUTPATIENT)
Dept: FAMILY MEDICINE CLINIC | Age: 53
End: 2023-10-31

## 2023-10-31 VITALS
SYSTOLIC BLOOD PRESSURE: 102 MMHG | HEIGHT: 71 IN | OXYGEN SATURATION: 97 % | WEIGHT: 199.2 LBS | RESPIRATION RATE: 18 BRPM | HEART RATE: 68 BPM | DIASTOLIC BLOOD PRESSURE: 70 MMHG | BODY MASS INDEX: 27.89 KG/M2 | TEMPERATURE: 98.1 F

## 2023-10-31 DIAGNOSIS — S22.41XA CLOSED FRACTURE OF MULTIPLE RIBS OF RIGHT SIDE, INITIAL ENCOUNTER: Primary | ICD-10-CM

## 2023-10-31 PROBLEM — S32.010A CLOSED COMPRESSION FRACTURE OF BODY OF L1 VERTEBRA (HCC): Status: RESOLVED | Noted: 2021-08-24 | Resolved: 2023-10-31

## 2023-10-31 PROBLEM — Z72.0 TOBACCO USE: Status: RESOLVED | Noted: 2021-03-31 | Resolved: 2023-10-31

## 2023-10-31 PROBLEM — S80.01XA HEMATOMA OF RIGHT KNEE REGION: Status: RESOLVED | Noted: 2021-02-26 | Resolved: 2023-10-31

## 2023-10-31 PROBLEM — Z85.528 HISTORY OF RENAL CELL CANCER: Status: ACTIVE | Noted: 2018-10-15

## 2023-10-31 PROBLEM — Z86.718 HISTORY OF DVT OF LOWER EXTREMITY: Status: ACTIVE | Noted: 2021-04-28

## 2023-10-31 PROBLEM — Z98.84 S/P GASTRIC BYPASS: Status: ACTIVE | Noted: 2023-10-31

## 2023-10-31 PROBLEM — R29.90 STROKE-LIKE SYMPTOMS: Status: RESOLVED | Noted: 2023-03-30 | Resolved: 2023-10-31

## 2023-10-31 PROBLEM — G45.9 TIA (TRANSIENT ISCHEMIC ATTACK): Status: RESOLVED | Noted: 2021-03-31 | Resolved: 2023-10-31

## 2023-10-31 PROBLEM — F11.20 OPIOID DEPENDENCE WITH CURRENT USE (HCC): Status: ACTIVE | Noted: 2023-10-31

## 2023-10-31 PROCEDURE — 99213 OFFICE O/P EST LOW 20 MIN: CPT | Performed by: FAMILY MEDICINE

## 2023-10-31 RX ORDER — OXYCODONE HYDROCHLORIDE 5 MG/1
5 TABLET ORAL EVERY 6 HOURS PRN
Qty: 28 TABLET | Refills: 0 | Status: SHIPPED | OUTPATIENT
Start: 2023-10-31 | End: 2023-11-07

## 2023-11-07 ENCOUNTER — TELEPHONE (OUTPATIENT)
Dept: PSYCHIATRY | Age: 53
End: 2023-11-07

## 2023-11-07 DIAGNOSIS — S22.41XA CLOSED FRACTURE OF MULTIPLE RIBS OF RIGHT SIDE, INITIAL ENCOUNTER: ICD-10-CM

## 2023-11-07 RX ORDER — OXYCODONE HYDROCHLORIDE 5 MG/1
5 TABLET ORAL EVERY 6 HOURS PRN
Qty: 28 TABLET | Refills: 0 | Status: SHIPPED | OUTPATIENT
Start: 2023-11-07 | End: 2023-11-07 | Stop reason: SDUPTHER

## 2023-11-07 RX ORDER — OXYCODONE HYDROCHLORIDE 5 MG/1
5 TABLET ORAL EVERY 6 HOURS PRN
Qty: 28 TABLET | Refills: 0 | Status: SHIPPED | OUTPATIENT
Start: 2023-11-07 | End: 2023-11-14

## 2023-11-07 RX ORDER — OXYCODONE HYDROCHLORIDE 5 MG/1
5 TABLET ORAL EVERY 6 HOURS PRN
Qty: 28 TABLET | Refills: 0 | Status: CANCELLED | OUTPATIENT
Start: 2023-11-07 | End: 2023-11-14

## 2023-11-07 NOTE — TELEPHONE ENCOUNTER
Patient needs oxycodone to go to [facility]. Medication is pending with retail pharmacy. Thank you.

## 2023-11-07 NOTE — TELEPHONE ENCOUNTER
Pt has rib fractures, which is quite painful. Rx for 5 mg oxycodone, up to QID, issued 10/31. #28 for 7 days. Please notify Shaquille Arreguin that I will refill once more. Have him try to save the oxycodone for night time use only, as this is usually the most painful time. Thanks.

## 2023-11-07 NOTE — TELEPHONE ENCOUNTER
PT CALLED TO LET US KNOW THAT [facility] [address] IS OUT OF THE OXYCODONE SO HE WOULD LIKE TO HAVE TO SCRIPT RESENT TO [facility]     [address]

## 2023-11-07 NOTE — TELEPHONE ENCOUNTER
Patient would like one more refill of his Oxycodone. He would like to taper off the medication. Patient also using pain patch. Medication is pending.

## 2023-11-27 RX ORDER — PANTOPRAZOLE SODIUM 40 MG/1
TABLET, DELAYED RELEASE ORAL
Qty: 180 TABLET | Refills: 1 | Status: SHIPPED | OUTPATIENT
Start: 2023-11-27

## 2023-12-05 RX ORDER — CLOPIDOGREL BISULFATE 75 MG/1
TABLET ORAL
Qty: 90 TABLET | Refills: 0 | Status: SHIPPED | OUTPATIENT
Start: 2023-12-05

## 2023-12-11 RX ORDER — FLUOXETINE HYDROCHLORIDE 20 MG/1
CAPSULE ORAL
Qty: 90 CAPSULE | Refills: 1 | Status: SHIPPED | OUTPATIENT
Start: 2023-12-11

## 2023-12-11 RX ORDER — ATORVASTATIN CALCIUM 40 MG/1
TABLET, FILM COATED ORAL
Qty: 90 TABLET | Refills: 1 | Status: SHIPPED | OUTPATIENT
Start: 2023-12-11

## 2023-12-11 RX ORDER — TRAZODONE HYDROCHLORIDE 100 MG/1
TABLET ORAL
Qty: 180 TABLET | Refills: 1 | Status: SHIPPED | OUTPATIENT
Start: 2023-12-11

## 2024-02-11 ENCOUNTER — APPOINTMENT (OUTPATIENT)
Dept: GENERAL RADIOLOGY | Age: 54
End: 2024-02-11
Payer: COMMERCIAL

## 2024-02-11 ENCOUNTER — HOSPITAL ENCOUNTER (EMERGENCY)
Age: 54
Discharge: HOME | End: 2024-02-11
Payer: COMMERCIAL

## 2024-02-11 VITALS
HEART RATE: 54 BPM | BODY MASS INDEX: 27.17 KG/M2 | DIASTOLIC BLOOD PRESSURE: 67 MMHG | TEMPERATURE: 98.3 F | RESPIRATION RATE: 16 BRPM | HEIGHT: 72 IN | WEIGHT: 200.62 LBS | OXYGEN SATURATION: 100 % | SYSTOLIC BLOOD PRESSURE: 113 MMHG

## 2024-02-11 DIAGNOSIS — M25.561 ACUTE PAIN OF RIGHT KNEE: Primary | ICD-10-CM

## 2024-02-11 PROCEDURE — 6360000002 HC RX W HCPCS

## 2024-02-11 PROCEDURE — 99284 EMERGENCY DEPT VISIT MOD MDM: CPT

## 2024-02-11 PROCEDURE — 6370000000 HC RX 637 (ALT 250 FOR IP)

## 2024-02-11 PROCEDURE — 96372 THER/PROPH/DIAG INJ SC/IM: CPT

## 2024-02-11 PROCEDURE — 73562 X-RAY EXAM OF KNEE 3: CPT

## 2024-02-11 RX ORDER — KETOROLAC TROMETHAMINE 15 MG/ML
15 INJECTION, SOLUTION INTRAMUSCULAR; INTRAVENOUS ONCE
Status: COMPLETED | OUTPATIENT
Start: 2024-02-11 | End: 2024-02-11

## 2024-02-11 RX ORDER — HYDROCODONE BITARTRATE AND ACETAMINOPHEN 5; 325 MG/1; MG/1
1 TABLET ORAL ONCE
Status: COMPLETED | OUTPATIENT
Start: 2024-02-11 | End: 2024-02-11

## 2024-02-11 RX ADMIN — KETOROLAC TROMETHAMINE 15 MG: 15 INJECTION, SOLUTION INTRAMUSCULAR; INTRAVENOUS at 19:35

## 2024-02-11 RX ADMIN — HYDROCODONE BITARTRATE AND ACETAMINOPHEN 1 TABLET: 5; 325 TABLET ORAL at 20:17

## 2024-02-12 NOTE — ED NOTES
Patient DCed from ED at this time. Discussed AVS, follow up, and scripts. They verbalized understanding. Reinforced that should symptoms persist or worsen to return to the ED. They verbalized understanding. Patient ambulated out of ED. RN thanked patient for choosing [facility].

## 2024-02-12 NOTE — ED PROVIDER NOTES
JACKY Cornell MD at [facility]    REVISION TOTAL KNEE ARTHROPLASTY Right 01/22/2020    RIGHT KNEE IRRIGATION AND DEBRIDEMENT WITH POLY EXCHANGE performed by Devan Kramer MD at [facility]    REVISION TOTAL KNEE ARTHROPLASTY Right 02/16/2021    RIGHT REMOVAL OF EXPLANT AND TOTAL KNEE REPLACEMENT performed by bE Scruggs MD at [facility] [place]    SPLENECTOMY      TOTAL KNEE ARTHROPLASTY  10/15/2013    RIGHT    TOTAL KNEE ARTHROPLASTY Right 08/12/2020    RIGHT ARTHROPLASY  RESECTION WITH INSERTION OF SPACER performed by Jose Cornell MD at [facility]    UPPER GASTROINTESTINAL ENDOSCOPY  06/07/2016    UPPER GASTROINTESTINAL ENDOSCOPY  04/02/2018    UPPER GASTROINTESTINAL ENDOSCOPY N/A 03/24/2022    ESOPHAGOGASTRODUODENOSCOPY performed by Karthik Mock MD at [facility] ENDOSCOPY    UPPER GASTROINTESTINAL ENDOSCOPY  05/31/2022    ESOPHAGOGASTRODUODENOSCOPY WITH BIOPSY performed by Virginie Medrano MD at [facility] ENDOSCOPY    VENTRAL HERNIA REPAIR N/A 12/2/2022    EXCISION ABDOMINAL WALL NODULE, REPAIR FASCIAL DEFECT performed by Morro Sheikh MD at [facility]       Medications:  Previous Medications    ATORVASTATIN (LIPITOR) 40 MG TABLET    TAKE 1 TABLET BY MOUTH EVERYDAY AT BEDTIME    CLOPIDOGREL (PLAVIX) 75 MG TABLET    TAKE 1 TABLET BY MOUTH EVERY DAY    CYANOCOBALAMIN 1000 MCG/ML INJECTION    Inject into the muscle    FLUOXETINE (PROZAC) 20 MG CAPSULE    TAKE 1 CAPSULE BY MOUTH EVERY DAY    GABAPENTIN (NEURONTIN) 600 MG TABLET    Take 1 tablet by mouth 4 times daily.    INSULIN PEN NEEDLE (PEN NEEDLES 3/16\") 31G X 5 MM MISC    Use one needle for each daily dose.    MULTIPLE VITAMIN TABS    Take 1 tablet by mouth daily     OXYBUTYNIN (DITROPAN-XL) 5 MG EXTENDED RELEASE TABLET    Take 1 tablet by mouth daily    PANTOPRAZOLE (PROTONIX) 40 MG TABLET    TAKE 1 TABLET BY MOUTH TWICE A DAY    SILDENAFIL (REVATIO) 20 MG TABLET    Take 4 tablets by mouth as needed (30 min prior to intercourse)    TERIPARATIDE, RECOMBINANT, (FORTEO) 600

## 2024-02-22 ENCOUNTER — APPOINTMENT (OUTPATIENT)
Dept: GENERAL RADIOLOGY | Age: 54
End: 2024-02-22
Payer: COMMERCIAL

## 2024-02-22 ENCOUNTER — HOSPITAL ENCOUNTER (EMERGENCY)
Age: 54
Discharge: HOME | End: 2024-02-22
Attending: STUDENT IN AN ORGANIZED HEALTH CARE EDUCATION/TRAINING PROGRAM
Payer: COMMERCIAL

## 2024-02-22 VITALS
BODY MASS INDEX: 28.11 KG/M2 | SYSTOLIC BLOOD PRESSURE: 119 MMHG | DIASTOLIC BLOOD PRESSURE: 78 MMHG | TEMPERATURE: 98 F | WEIGHT: 207.23 LBS | HEART RATE: 73 BPM | RESPIRATION RATE: 16 BRPM | OXYGEN SATURATION: 99 %
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
## 2024-02-22 PROCEDURE — 99284 EMERGENCY DEPT VISIT MOD MDM: CPT

## 2024-02-22 PROCEDURE — 96372 THER/PROPH/DIAG INJ SC/IM: CPT

## 2024-02-22 PROCEDURE — 6360000002 HC RX W HCPCS: Performed by: STUDENT IN AN ORGANIZED HEALTH CARE EDUCATION/TRAINING PROGRAM

## 2024-02-22 PROCEDURE — 6370000000 HC RX 637 (ALT 250 FOR IP): Performed by: STUDENT IN AN ORGANIZED HEALTH CARE EDUCATION/TRAINING PROGRAM

## 2024-02-22 PROCEDURE — 73140 X-RAY EXAM OF FINGER(S): CPT

## 2024-02-22 RX ORDER — AMOXICILLIN AND CLAVULANATE POTASSIUM 875; 125 MG/1; MG/1
1 TABLET, FILM COATED ORAL 2 TIMES DAILY
Qty: 14 TABLET | Refills: 0 | Status: SHIPPED | OUTPATIENT
Start: 2024-02-22 | End: 2024-02-29

## 2024-02-22 RX ORDER — ACETAMINOPHEN 500 MG
1000 TABLET ORAL ONCE
Status: COMPLETED | OUTPATIENT
Start: 2024-02-22 | End: 2024-02-22

## 2024-02-22 RX ORDER — KETOROLAC TROMETHAMINE 30 MG/ML
30 INJECTION, SOLUTION INTRAMUSCULAR; INTRAVENOUS ONCE
Status: COMPLETED | OUTPATIENT
Start: 2024-02-22 | End: 2024-02-22

## 2024-02-22 RX ADMIN — KETOROLAC TROMETHAMINE 30 MG: 30 INJECTION INTRAMUSCULAR; INTRAVENOUS at 19:49

## 2024-02-22 RX ADMIN — ACETAMINOPHEN 1000 MG: 500 TABLET ORAL at 19:48

## 2024-02-22 ASSESSMENT — PAIN - FUNCTIONAL ASSESSMENT
PAIN_FUNCTIONAL_ASSESSMENT: NONE - DENIES PAIN
PAIN_FUNCTIONAL_ASSESSMENT: 0-10
PAIN_FUNCTIONAL_ASSESSMENT: ACTIVITIES ARE NOT PREVENTED

## 2024-02-22 ASSESSMENT — PAIN DESCRIPTION - LOCATION: LOCATION: HAND

## 2024-02-22 ASSESSMENT — PAIN SCALES - GENERAL
PAINLEVEL_OUTOF10: 6
PAINLEVEL_OUTOF10: 0

## 2024-02-22 ASSESSMENT — PAIN DESCRIPTION - FREQUENCY: FREQUENCY: CONTINUOUS

## 2024-02-22 ASSESSMENT — PAIN DESCRIPTION - ORIENTATION: ORIENTATION: LEFT

## 2024-02-23 ENCOUNTER — TELEPHONE (OUTPATIENT)
Dept: ORTHOPEDIC SURGERY | Age: 54
End: 2024-02-23

## 2024-02-23 NOTE — ED PROVIDER NOTES
fracture or dislocation noted, no foreign body noted    Interpretation per the Radiologist below, if available at the time of this note:    XR FINGER LEFT (MIN 2 VIEWS)   Final Result   No fracture           No results found.   No results found.     LABS: (none if blank)  Labs Reviewed - No data to display    (Any cultures that may have been sent were not resulted at the time of this patient visit)    MEDICAL DECISION MAKING / ED COURSE:     External Documentation Reviewed: Previous patient encounter documents & history available on EMR was reviewed:   Patient was seen on 2/11/2024 for acute pain in the right knee    Decision Rules/Clinical Scores utilized:               See Formal Diagnostic Results above for the lab and radiology tests and orders.    ED Reassessment:  See ED course    ED Course as of 02/22/24 2043   Thu Feb 22, 2024 2042 XR FINGER LEFT (MIN 2 VIEWS)  \"FINDINGS:  Negative for fracture or dislocation.  No gas in the soft tissues.  Soft  tissue laceration at the dorsal aspect of the thumb.     IMPRESSION:  No fracture      [AL]      ED Course User Index  [AL] Wai Macdonald MD       Is this patient to be included in the SEP-1 core measure? No Exclusion criteria - the patient is NOT to be included for SEP-1 Core Measure due to: Infection is not suspected     Differential Diagnosis includes (but not limited to):      MDM Summary:  History was obtained from: Patient and chart review      This is a 53-year-old male who was  his dog from his neighbors dog when he was bitten by a dog on his left thumb he has 2 small puncture wounds, he has full range of motion no crepitus neurovascular intact.  He is up-to-date with his tetanus shot.  An x-ray was obtained showing: No acute fracture or dislocation noted, no foreign body.  His wound was extensively cleaned.  And he was discharged home with primary care physician follow-up Augmentin as well as a hand specialist follow-up as well.  Just

## 2024-02-23 NOTE — DISCHARGE INSTRUCTIONS
Take antibiotic as prescribed.  You may take Tylenol Motrin for pain control.  Follow-up with your primary care doctor as discussed for wound check and follow-up with orthopedic referral given to you as needed.

## 2024-02-23 NOTE — TELEPHONE ENCOUNTER
Did speak with patient regarding ED referral. Will wait to see if appt will be needed, will call back. Upon return call please schedule with hand provider per Dr. Cornejo.

## 2024-02-25 NOTE — ED PROVIDER NOTES
right knee    Vaughn's esophagus     Benign intracranial hypertension     Cancer (HCC)     left kidney    Carpal tunnel syndrome     Chronic pain     Depression     Ear infection 05/06/2022    GERD (gastroesophageal reflux disease)     Headache(784.0)     History of blood transfusion     2018    History of loop recorder 2022    placed in 2021 and removed in 2022 d/t infection    History of spinal fracture 2021    L1,L7,L8 fall T5,T8  - unknown 2022    History of tobacco use     Quit 7/2014    Hyperlipidemia     Left wrist fracture 2020    fall    Migraine     chronic for 1 year    Morbid obesity ([address]     Movement disorder     Onychomycosis     Sleep apnea, obstructive     Severe uses cpap stop bang 6    Unspecified cerebral artery occlusion with cerebral infarction 2014    slight weakness in left arm    Wears dentures     full set    Wears glasses        SURGICAL HISTORY     Past Surgical History:   Procedure Laterality Date    ABDOMEN SURGERY      gastric bypass    ABDOMEN SURGERY  04/07/2016    repair of recurrent incisional hernia with mesh, removal of old mesh, bilateral component separation    ABDOMINAL EXPLORATION SURGERY  01/11/2016    exp lap, lysis of adhesions, small bowel resection    CARPAL TUNNEL RELEASE      bilat    COLONOSCOPY N/A 05/31/2022    COLONOSCOPY POLYPECTOMY SNARE/COLD BIOPSY performed by Dolly Concepcion MD at [facility], ESOPHAGUS      ENDOSCOPY, COLON, DIAGNOSTIC      EYE SURGERY Bilateral     Lasik    GASTRIC BYPASS SURGERY  01/2009    Has lost about 200 pounds.     HERNIA REPAIR  03/23/2016    ventral    KIDNEY REMOVAL Left 08/01/2018    KNEE ARTHROSCOPY Right 07/11/2013    Dr.Robert Sanders     KNEE ARTHROSCOPY Right 07/11/2012    RIGHT KNEE ARTHROSCOPY WITH CHONDROPLASTY    KNEE SURGERY Right 02/18/2020    INCISION AND DRAINAGE RIGHT KNEE AND WOULD VAC PLACEMENT performed by Madina German MD at [place] [id number] Right 02/26/2021    INCISION AND DRAINAGE 0

## 2024-02-26 ENCOUNTER — HOSPITAL ENCOUNTER (EMERGENCY)
Age: 54
Discharge: HOME | End: 2024-02-27
Attending: EMERGENCY MEDICINE
Payer: COMMERCIAL

## 2024-02-26 ENCOUNTER — APPOINTMENT (OUTPATIENT)
Dept: CT IMAGING | Age: 54
End: 2024-02-26
Payer: COMMERCIAL

## 2024-02-26 DIAGNOSIS — K52.9 GASTROENTERITIS: Primary | ICD-10-CM

## 2024-02-26 DIAGNOSIS — K45.8 INTERNAL HERNIA: ICD-10-CM

## 2024-02-26 LAB
ALBUMIN SERPL-MCNC: 4.3 G/DL (ref 3.4–5)
ALP SERPL-CCNC: 102 U/L (ref 40–129)
ALT SERPL-CCNC: 9 U/L (ref 10–40)
ANION GAP SERPL CALCULATED.3IONS-SCNC: 11 MMOL/L (ref 3–16)
AST SERPL-CCNC: 18 U/L (ref 15–37)
BASOPHILS # BLD: 0.1 K/UL (ref 0–0.2)
BASOPHILS NFR BLD: 0.9 %
BILIRUB DIRECT SERPL-MCNC: <0.2 MG/DL (ref 0–0.3)
BILIRUB INDIRECT SERPL-MCNC: ABNORMAL MG/DL (ref 0–1)
BILIRUB SERPL-MCNC: 0.3 MG/DL (ref 0–1)
BILIRUB UR QL STRIP.AUTO: NEGATIVE
BUN SERPL-MCNC: 8 MG/DL (ref 7–20)
CALCIUM SERPL-MCNC: 9.1 MG/DL (ref 8.3–10.6)
CHLORIDE SERPL-SCNC: 106 MMOL/L (ref 99–110)
CLARITY UR: CLEAR
CO2 SERPL-SCNC: 22 MMOL/L (ref 21–32)
COLOR UR: YELLOW
CREAT SERPL-MCNC: 1 MG/DL (ref 0.9–1.3)
DEPRECATED RDW RBC AUTO: 16.6 % (ref 12.4–15.4)
EOSINOPHIL # BLD: 0.4 K/UL (ref 0–0.6)
EOSINOPHIL NFR BLD: 3.8 %
GFR SERPLBLD CREATININE-BSD FMLA CKD-EPI: >60 ML/MIN/{1.73_M2}
GLUCOSE SERPL-MCNC: 88 MG/DL (ref 70–99)
GLUCOSE UR STRIP.AUTO-MCNC: NEGATIVE MG/DL
HCT VFR BLD AUTO: 37.7 % (ref 40.5–52.5)
HGB BLD-MCNC: 12.7 G/DL (ref 13.5–17.5)
HGB UR QL STRIP.AUTO: NEGATIVE
KETONES UR STRIP.AUTO-MCNC: ABNORMAL MG/DL
LEUKOCYTE ESTERASE UR QL STRIP.AUTO: NEGATIVE
LIPASE SERPL-CCNC: 29 U/L (ref 13–60)
LYMPHOCYTES # BLD: 1.9 K/UL (ref 1–5.1)
LYMPHOCYTES NFR BLD: 19.3 %
MCH RBC QN AUTO: 29.3 PG (ref 26–34)
MCHC RBC AUTO-ENTMCNC: 33.6 G/DL (ref 31–36)
MCV RBC AUTO: 87.1 FL (ref 80–100)
MONOCYTES # BLD: 0.6 K/UL (ref 0–1.3)
MONOCYTES NFR BLD: 5.9 %
NEUTROPHILS # BLD: 6.9 K/UL (ref 1.7–7.7)
NEUTROPHILS NFR BLD: 70.1 %
NITRITE UR QL STRIP.AUTO: NEGATIVE
PH UR STRIP.AUTO: 5 [PH] (ref 5–8)
PLATELET # BLD AUTO: 328 K/UL (ref 135–450)
PMV BLD AUTO: 8 FL (ref 5–10.5)
POTASSIUM SERPL-SCNC: 4.8 MMOL/L (ref 3.5–5.1)
PROT SERPL-MCNC: 6.9 G/DL (ref 6.4–8.2)
PROT UR STRIP.AUTO-MCNC: NEGATIVE MG/DL
RBC # BLD AUTO: 4.33 M/UL (ref 4.2–5.9)
SODIUM SERPL-SCNC: 139 MMOL/L (ref 136–145)
SP GR UR STRIP.AUTO: 1.02 (ref 1–1.03)
UA COMPLETE W REFLEX CULTURE PNL UR: ABNORMAL
UA DIPSTICK W REFLEX MICRO PNL UR: ABNORMAL
URN SPEC COLLECT METH UR: ABNORMAL
UROBILINOGEN UR STRIP-ACNC: 1 E.U./DL
WBC # BLD AUTO: 9.8 K/UL (ref 4–11)

## 2024-02-26 PROCEDURE — 81003 URINALYSIS AUTO W/O SCOPE: CPT

## 2024-02-26 PROCEDURE — 96375 TX/PRO/DX INJ NEW DRUG ADDON: CPT

## 2024-02-26 PROCEDURE — 80048 BASIC METABOLIC PNL TOTAL CA: CPT

## 2024-02-26 PROCEDURE — 80076 HEPATIC FUNCTION PANEL: CPT

## 2024-02-26 PROCEDURE — 96374 THER/PROPH/DIAG INJ IV PUSH: CPT

## 2024-02-26 PROCEDURE — 83690 ASSAY OF LIPASE: CPT

## 2024-02-26 PROCEDURE — 74177 CT ABD & PELVIS W/CONTRAST: CPT

## 2024-02-26 PROCEDURE — 99285 EMERGENCY DEPT VISIT HI MDM: CPT

## 2024-02-26 PROCEDURE — 85025 COMPLETE CBC W/AUTO DIFF WBC: CPT

## 2024-02-26 RX ORDER — KETOROLAC TROMETHAMINE 30 MG/ML
30 INJECTION, SOLUTION INTRAMUSCULAR; INTRAVENOUS ONCE
Status: COMPLETED | OUTPATIENT
Start: 2024-02-27 | End: 2024-02-27

## 2024-02-26 RX ORDER — 0.9 % SODIUM CHLORIDE 0.9 %
1000 INTRAVENOUS SOLUTION INTRAVENOUS ONCE
Status: COMPLETED | OUTPATIENT
Start: 2024-02-26 | End: 2024-02-27

## 2024-02-26 RX ORDER — DIPHENHYDRAMINE HYDROCHLORIDE 50 MG/ML
12.5 INJECTION INTRAMUSCULAR; INTRAVENOUS ONCE
Status: COMPLETED | OUTPATIENT
Start: 2024-02-27 | End: 2024-02-27

## 2024-02-26 RX ORDER — METOCLOPRAMIDE HYDROCHLORIDE 5 MG/ML
10 INJECTION INTRAMUSCULAR; INTRAVENOUS ONCE
Status: COMPLETED | OUTPATIENT
Start: 2024-02-27 | End: 2024-02-27

## 2024-02-26 RX ORDER — ONDANSETRON 2 MG/ML
4 INJECTION INTRAMUSCULAR; INTRAVENOUS ONCE
Status: COMPLETED | OUTPATIENT
Start: 2024-02-26 | End: 2024-02-27

## 2024-02-26 RX ORDER — MORPHINE SULFATE 4 MG/ML
4 INJECTION, SOLUTION INTRAMUSCULAR; INTRAVENOUS ONCE
Status: COMPLETED | OUTPATIENT
Start: 2024-02-27 | End: 2024-02-27

## 2024-02-26 ASSESSMENT — PAIN SCALES - GENERAL: PAINLEVEL_OUTOF10: 9

## 2024-02-26 ASSESSMENT — PAIN DESCRIPTION - DESCRIPTORS: DESCRIPTORS: DISCOMFORT

## 2024-02-26 ASSESSMENT — PAIN DESCRIPTION - ORIENTATION: ORIENTATION: UPPER

## 2024-02-26 ASSESSMENT — PAIN DESCRIPTION - LOCATION: LOCATION: ABDOMEN

## 2024-02-26 ASSESSMENT — PAIN DESCRIPTION - PAIN TYPE: TYPE: ACUTE PAIN

## 2024-02-26 ASSESSMENT — PAIN DESCRIPTION - FREQUENCY: FREQUENCY: CONTINUOUS

## 2024-02-26 ASSESSMENT — PAIN - FUNCTIONAL ASSESSMENT: PAIN_FUNCTIONAL_ASSESSMENT: ACTIVITIES ARE NOT PREVENTED

## 2024-02-27 VITALS
HEART RATE: 50 BPM | HEIGHT: 72 IN | BODY MASS INDEX: 26.9 KG/M2 | WEIGHT: 198.63 LBS | RESPIRATION RATE: 14 BRPM | OXYGEN SATURATION: 93 % | SYSTOLIC BLOOD PRESSURE: 101 MMHG | TEMPERATURE: 98.3 F | DIASTOLIC BLOOD PRESSURE: 60 MMHG

## 2024-02-27 PROCEDURE — 6360000002 HC RX W HCPCS: Performed by: EMERGENCY MEDICINE

## 2024-02-27 PROCEDURE — 96374 THER/PROPH/DIAG INJ IV PUSH: CPT

## 2024-02-27 PROCEDURE — 96375 TX/PRO/DX INJ NEW DRUG ADDON: CPT

## 2024-02-27 PROCEDURE — 2580000003 HC RX 258: Performed by: EMERGENCY MEDICINE

## 2024-02-27 PROCEDURE — 6360000004 HC RX CONTRAST MEDICATION: Performed by: EMERGENCY MEDICINE

## 2024-02-27 RX ADMIN — METOCLOPRAMIDE 10 MG: 5 INJECTION, SOLUTION INTRAMUSCULAR; INTRAVENOUS at 00:46

## 2024-02-27 RX ADMIN — MORPHINE SULFATE 4 MG: 4 INJECTION INTRAVENOUS at 00:53

## 2024-02-27 RX ADMIN — SODIUM CHLORIDE 1000 ML: 9 INJECTION, SOLUTION INTRAVENOUS at 00:56

## 2024-02-27 RX ADMIN — DIPHENHYDRAMINE HYDROCHLORIDE 12.5 MG: 50 INJECTION INTRAMUSCULAR; INTRAVENOUS at 00:44

## 2024-02-27 RX ADMIN — KETOROLAC TROMETHAMINE 30 MG: 30 INJECTION, SOLUTION INTRAMUSCULAR; INTRAVENOUS at 00:51

## 2024-02-27 RX ADMIN — ONDANSETRON 4 MG: 2 INJECTION INTRAMUSCULAR; INTRAVENOUS at 00:49

## 2024-02-27 RX ADMIN — IOPAMIDOL 75 ML: 755 INJECTION, SOLUTION INTRAVENOUS at 00:00

## 2024-02-27 NOTE — ED NOTES
Provider order placed for patient's discharge. Provider reviewed decision to discharge with the patient. Discharge paperwork and any prescriptions were reviewed with the patient. Patient verbalized understanding of discharge education and any prescriptions and has no further questions or further needs at this time. Patient left with all personal belongings and was stable upon departure. Patient thanked for choosing [facility] and informed to return should any need arise.

## 2024-02-27 NOTE — ED PROVIDER NOTES
I PERSONALLY SAW THE PATIENT AND PERFORMED A SUBSTANTIVE PORTION OF THE VISIT INCLUDING ALL ASPECTS OF THE MEDICAL DECISION MAKING PROCESS.    [facility] EMERGENCY DEPARTMENT  EMERGENCY DEPARTMENT ENCOUNTER      Pt Name: Davis Payton  MRN: [id number]  Birthdate 1970  Date of evaluation: 2/26/2024  Provider: Aris Chen MD    CHIEF COMPLAINT       Chief Complaint   Patient presents with    Abdominal Pain    Emesis    Diarrhea       HISTORY OF PRESENT ILLNESS    Davis Payton is a 53 y.o. male who presents to the emergency department with nausea vomiting diarrhea.  Patient present with nausea vomiting diarrhea from home.  Crampy abdominal pain.  Elevation he was having a lot of diarrhea.  That has improved.  Since then has had nausea and vomiting.  Crampy pain.  No other associated symptoms.    Nursing Notes were reviewed. Including nursing noted for FM, Surgical History, Past Medical History, Social History, vitals, and allergies; agree with all.     REVIEW OF SYSTEMS       Review of Systems    Except as noted above the remainder of the review of systems was reviewed and negative.     PAST MEDICAL HISTORY     Past Medical History:   Diagnosis Date    Alcoholism (HCC)     last drink 2015    Allergic rhinitis, seasonal     Anemia     Arthritis     right knee    Vaughn's esophagus     Benign intracranial hypertension     Cancer (HCC)     left kidney    Carpal tunnel syndrome     Chronic pain     Depression     Ear infection 05/06/2022    GERD (gastroesophageal reflux disease)     Headache(784.0)     History of blood transfusion     2018    History of loop recorder 2022    placed in 2021 and removed in 2022 d/t infection    History of spinal fracture 2021    L1,L7,L8 fall T5,T8  - unknown 2022    History of tobacco use     Quit 7/2014    Hyperlipidemia     Left wrist fracture 2020    fall    Migraine     chronic for 1 year    Morbid obesity (HCC)     Movement disorder     Onychomycosis

## 2024-02-27 NOTE — ED TRIAGE NOTES
Patient to the ER with complaints of abdominal pain with diarrhea and dry heaving.  Patient reports this started with the diarrhea around 0100 this morning.     Patient has been on augmentin for a dog bite and is concerned the abx are upsetting his stomach.

## 2024-03-07 RX ORDER — CLOPIDOGREL BISULFATE 75 MG/1
TABLET ORAL
Qty: 90 TABLET | Refills: 0 | OUTPATIENT
Start: 2024-03-07

## 2024-03-07 NOTE — TELEPHONE ENCOUNTER
Requested Prescriptions     Pending Prescriptions Disp Refills    clopidogrel (PLAVIX) 75 MG tablet [Pharmacy Med Name: CLOPIDOGREL 75 MG TABLET] 90 tablet 0     Sig: TAKE 1 TABLET BY MOUTH EVERY DAY          Number: 90    Refills: 0    Last Office Visit: 11/28/2022     Next Office Visit: None scheduled    Last Refill: 12/05/2023    Last Labs:  02/26/2024    Called patient he stated he gets refills from his PCP and no longer needs to see the cardiologist because he feels \"great\".

## 2024-03-30 PROCEDURE — 96372 THER/PROPH/DIAG INJ SC/IM: CPT

## 2024-03-30 PROCEDURE — 96374 THER/PROPH/DIAG INJ IV PUSH: CPT

## 2024-03-30 PROCEDURE — 96375 TX/PRO/DX INJ NEW DRUG ADDON: CPT

## 2024-03-30 PROCEDURE — 99285 EMERGENCY DEPT VISIT HI MDM: CPT

## 2024-03-30 ASSESSMENT — PAIN - FUNCTIONAL ASSESSMENT: PAIN_FUNCTIONAL_ASSESSMENT: 0-10

## 2024-03-30 ASSESSMENT — PAIN DESCRIPTION - DESCRIPTORS: DESCRIPTORS: CRAMPING;BURNING

## 2024-03-30 ASSESSMENT — PAIN DESCRIPTION - LOCATION: LOCATION: ABDOMEN

## 2024-03-30 ASSESSMENT — PAIN DESCRIPTION - ORIENTATION: ORIENTATION: MID

## 2024-03-30 ASSESSMENT — PAIN SCALES - GENERAL: PAINLEVEL_OUTOF10: 8

## 2024-03-31 ENCOUNTER — APPOINTMENT (OUTPATIENT)
Dept: CT IMAGING | Age: 54
End: 2024-03-31
Payer: COMMERCIAL

## 2024-03-31 ENCOUNTER — HOSPITAL ENCOUNTER (EMERGENCY)
Age: 54
Discharge: HOME | End: 2024-03-31
Attending: EMERGENCY MEDICINE
Payer: COMMERCIAL

## 2024-03-31 VITALS
RESPIRATION RATE: 15 BRPM | HEART RATE: 52 BPM | TEMPERATURE: 97.3 F | SYSTOLIC BLOOD PRESSURE: 104 MMHG | OXYGEN SATURATION: 94 % | HEIGHT: 71 IN | BODY MASS INDEX: 28 KG/M2 | DIASTOLIC BLOOD PRESSURE: 75 MMHG | WEIGHT: 200 LBS

## 2024-03-31 DIAGNOSIS — R10.30 LOWER ABDOMINAL PAIN: Primary | ICD-10-CM

## 2024-03-31 LAB
ALBUMIN SERPL-MCNC: 4.4 G/DL (ref 3.4–5)
ALBUMIN/GLOB SERPL: 1.6 {RATIO} (ref 1.1–2.2)
ALP SERPL-CCNC: 105 U/L (ref 40–129)
ALT SERPL-CCNC: 11 U/L (ref 10–40)
ANION GAP SERPL CALCULATED.3IONS-SCNC: 9 MMOL/L (ref 3–16)
AST SERPL-CCNC: 15 U/L (ref 15–37)
BACTERIA URNS QL MICRO: NORMAL /HPF
BASOPHILS # BLD: 0.1 K/UL (ref 0–0.2)
BASOPHILS NFR BLD: 1.5 %
BILIRUB SERPL-MCNC: 0.5 MG/DL (ref 0–1)
BILIRUB UR QL STRIP.AUTO: ABNORMAL
BUN SERPL-MCNC: 7 MG/DL (ref 7–20)
CALCIUM SERPL-MCNC: 9.4 MG/DL (ref 8.3–10.6)
CHLORIDE SERPL-SCNC: 101 MMOL/L (ref 99–110)
CLARITY UR: CLEAR
CO2 SERPL-SCNC: 27 MMOL/L (ref 21–32)
COLOR UR: ABNORMAL
CREAT SERPL-MCNC: 1.2 MG/DL (ref 0.9–1.3)
DEPRECATED RDW RBC AUTO: 16.3 % (ref 12.4–15.4)
EOSINOPHIL # BLD: 0.3 K/UL (ref 0–0.6)
EOSINOPHIL NFR BLD: 2.9 %
EPI CELLS #/AREA URNS AUTO: 1 /HPF (ref 0–5)
GFR SERPLBLD CREATININE-BSD FMLA CKD-EPI: 72 ML/MIN/{1.73_M2}
GLUCOSE SERPL-MCNC: 96 MG/DL (ref 70–99)
GLUCOSE UR STRIP.AUTO-MCNC: NEGATIVE MG/DL
HCT VFR BLD AUTO: 40.7 % (ref 40.5–52.5)
HGB BLD-MCNC: 13.3 G/DL (ref 13.5–17.5)
HGB UR QL STRIP.AUTO: NEGATIVE
HYALINE CASTS #/AREA URNS AUTO: 0 /LPF (ref 0–8)
KETONES UR STRIP.AUTO-MCNC: NEGATIVE MG/DL
LACTATE BLDV-SCNC: 0.7 MMOL/L (ref 0.4–2)
LEUKOCYTE ESTERASE UR QL STRIP.AUTO: ABNORMAL
LIPASE SERPL-CCNC: 28 U/L (ref 13–60)
LYMPHOCYTES # BLD: 3.9 K/UL (ref 1–5.1)
LYMPHOCYTES NFR BLD: 44.7 %
MCH RBC QN AUTO: 28.4 PG (ref 26–34)
MCHC RBC AUTO-ENTMCNC: 32.7 G/DL (ref 31–36)
MCV RBC AUTO: 86.8 FL (ref 80–100)
MONOCYTES # BLD: 0.8 K/UL (ref 0–1.3)
MONOCYTES NFR BLD: 8.7 %
NEUTROPHILS # BLD: 3.7 K/UL (ref 1.7–7.7)
NEUTROPHILS NFR BLD: 42.2 %
NITRITE UR QL STRIP.AUTO: NEGATIVE
PH UR STRIP.AUTO: 5.5 [PH] (ref 5–8)
PLATELET # BLD AUTO: 336 K/UL (ref 135–450)
PMV BLD AUTO: 7.9 FL (ref 5–10.5)
POTASSIUM SERPL-SCNC: 3.7 MMOL/L (ref 3.5–5.1)
PROT SERPL-MCNC: 7.2 G/DL (ref 6.4–8.2)
PROT UR STRIP.AUTO-MCNC: ABNORMAL MG/DL
RBC # BLD AUTO: 4.68 M/UL (ref 4.2–5.9)
RBC CLUMPS #/AREA URNS AUTO: 2 /HPF (ref 0–4)
SODIUM SERPL-SCNC: 137 MMOL/L (ref 136–145)
SP GR UR STRIP.AUTO: 1.02 (ref 1–1.03)
UA COMPLETE W REFLEX CULTURE PNL UR: ABNORMAL
UA DIPSTICK W REFLEX MICRO PNL UR: YES
URN SPEC COLLECT METH UR: ABNORMAL
UROBILINOGEN UR STRIP-ACNC: 1 E.U./DL
WBC # BLD AUTO: 8.7 K/UL (ref 4–11)
WBC #/AREA URNS AUTO: 3 /HPF (ref 0–5)

## 2024-03-31 PROCEDURE — 2580000003 HC RX 258: Performed by: EMERGENCY MEDICINE

## 2024-03-31 PROCEDURE — 74177 CT ABD & PELVIS W/CONTRAST: CPT

## 2024-03-31 PROCEDURE — 80053 COMPREHEN METABOLIC PANEL: CPT

## 2024-03-31 PROCEDURE — C9113 INJ PANTOPRAZOLE SODIUM, VIA: HCPCS | Performed by: EMERGENCY MEDICINE

## 2024-03-31 PROCEDURE — 83690 ASSAY OF LIPASE: CPT

## 2024-03-31 PROCEDURE — 85025 COMPLETE CBC W/AUTO DIFF WBC: CPT

## 2024-03-31 PROCEDURE — 96374 THER/PROPH/DIAG INJ IV PUSH: CPT

## 2024-03-31 PROCEDURE — 83605 ASSAY OF LACTIC ACID: CPT

## 2024-03-31 PROCEDURE — 36415 COLL VENOUS BLD VENIPUNCTURE: CPT

## 2024-03-31 PROCEDURE — 6370000000 HC RX 637 (ALT 250 FOR IP): Performed by: EMERGENCY MEDICINE

## 2024-03-31 PROCEDURE — 6360000004 HC RX CONTRAST MEDICATION: Performed by: EMERGENCY MEDICINE

## 2024-03-31 PROCEDURE — 96372 THER/PROPH/DIAG INJ SC/IM: CPT

## 2024-03-31 PROCEDURE — 96375 TX/PRO/DX INJ NEW DRUG ADDON: CPT

## 2024-03-31 PROCEDURE — 81001 URINALYSIS AUTO W/SCOPE: CPT

## 2024-03-31 PROCEDURE — 6360000002 HC RX W HCPCS: Performed by: EMERGENCY MEDICINE

## 2024-03-31 RX ORDER — PANTOPRAZOLE SODIUM 40 MG/10ML
40 INJECTION, POWDER, LYOPHILIZED, FOR SOLUTION INTRAVENOUS ONCE
Status: COMPLETED | OUTPATIENT
Start: 2024-03-31 | End: 2024-03-31

## 2024-03-31 RX ORDER — DICYCLOMINE HYDROCHLORIDE 10 MG/1
10 CAPSULE ORAL
Qty: 360 CAPSULE | Refills: 0 | Status: SHIPPED | OUTPATIENT
Start: 2024-03-31

## 2024-03-31 RX ORDER — OXYCODONE HYDROCHLORIDE AND ACETAMINOPHEN 5; 325 MG/1; MG/1
1 TABLET ORAL ONCE
Status: COMPLETED | OUTPATIENT
Start: 2024-03-31 | End: 2024-03-31

## 2024-03-31 RX ORDER — METOCLOPRAMIDE 10 MG/1
10 TABLET ORAL 4 TIMES DAILY
Qty: 20 TABLET | Refills: 0 | Status: SHIPPED | OUTPATIENT
Start: 2024-03-31

## 2024-03-31 RX ORDER — 0.9 % SODIUM CHLORIDE 0.9 %
500 INTRAVENOUS SOLUTION INTRAVENOUS ONCE
Status: COMPLETED | OUTPATIENT
Start: 2024-03-31 | End: 2024-03-31

## 2024-03-31 RX ORDER — DICYCLOMINE HYDROCHLORIDE 10 MG/ML
20 INJECTION INTRAMUSCULAR ONCE
Status: COMPLETED | OUTPATIENT
Start: 2024-03-31 | End: 2024-03-31

## 2024-03-31 RX ORDER — PANTOPRAZOLE SODIUM 40 MG/1
40 TABLET, DELAYED RELEASE ORAL
Qty: 90 TABLET | Refills: 1 | Status: SHIPPED | OUTPATIENT
Start: 2024-03-31

## 2024-03-31 RX ORDER — ONDANSETRON 2 MG/ML
4 INJECTION INTRAMUSCULAR; INTRAVENOUS ONCE
Status: COMPLETED | OUTPATIENT
Start: 2024-03-31 | End: 2024-03-31

## 2024-03-31 RX ADMIN — SODIUM CHLORIDE 500 ML: 9 INJECTION, SOLUTION INTRAVENOUS at 02:49

## 2024-03-31 RX ADMIN — ONDANSETRON 4 MG: 2 INJECTION INTRAMUSCULAR; INTRAVENOUS at 02:33

## 2024-03-31 RX ADMIN — OXYCODONE AND ACETAMINOPHEN 1 TABLET: 5; 325 TABLET ORAL at 04:26

## 2024-03-31 RX ADMIN — PANTOPRAZOLE SODIUM 40 MG: 40 INJECTION, POWDER, FOR SOLUTION INTRAVENOUS at 02:46

## 2024-03-31 RX ADMIN — DICYCLOMINE HYDROCHLORIDE 20 MG: 10 INJECTION, SOLUTION INTRAMUSCULAR at 02:34

## 2024-03-31 RX ADMIN — IOPAMIDOL 75 ML: 755 INJECTION, SOLUTION INTRAVENOUS at 03:09

## 2024-03-31 ASSESSMENT — PAIN DESCRIPTION - LOCATION: LOCATION: ABDOMEN

## 2024-03-31 ASSESSMENT — PAIN SCALES - GENERAL: PAINLEVEL_OUTOF10: 8

## 2024-03-31 ASSESSMENT — PAIN DESCRIPTION - ORIENTATION: ORIENTATION: LOWER

## 2024-04-02 ENCOUNTER — OFFICE VISIT (OUTPATIENT)
Dept: FAMILY MEDICINE CLINIC | Age: 54
End: 2024-04-02
Payer: COMMERCIAL

## 2024-04-02 VITALS
HEART RATE: 64 BPM | WEIGHT: 191.8 LBS | OXYGEN SATURATION: 97 % | SYSTOLIC BLOOD PRESSURE: 100 MMHG | RESPIRATION RATE: 18 BRPM | TEMPERATURE: 97.9 F | DIASTOLIC BLOOD PRESSURE: 60 MMHG | BODY MASS INDEX: 26.75 KG/M2

## 2024-04-02 DIAGNOSIS — R10.84 GENERALIZED ABDOMINAL PAIN: Primary | ICD-10-CM

## 2024-04-02 DIAGNOSIS — R10.84 GENERALIZED ABDOMINAL PAIN: ICD-10-CM

## 2024-04-02 DIAGNOSIS — K92.1 HEMATOCHEZIA: ICD-10-CM

## 2024-04-02 LAB
ERYTHROCYTE [SEDIMENTATION RATE] IN BLOOD BY WESTERGREN METHOD: 1 MM/HR (ref 0–20)
TSH SERPL DL<=0.005 MIU/L-ACNC: 0.34 UIU/ML (ref 0.27–4.2)

## 2024-04-02 PROCEDURE — 99214 OFFICE O/P EST MOD 30 MIN: CPT | Performed by: FAMILY MEDICINE

## 2024-04-02 RX ORDER — OXYCODONE HYDROCHLORIDE 5 MG/1
5 TABLET ORAL EVERY 6 HOURS PRN
Qty: 28 TABLET | Refills: 0 | Status: SHIPPED | OUTPATIENT
Start: 2024-04-02 | End: 2024-04-09

## 2024-04-02 SDOH — ECONOMIC STABILITY: FOOD INSECURITY: WITHIN THE PAST 12 MONTHS, THE FOOD YOU BOUGHT JUST DIDN'T LAST AND YOU DIDN'T HAVE MONEY TO GET MORE.: NEVER TRUE

## 2024-04-02 SDOH — ECONOMIC STABILITY: INCOME INSECURITY: HOW HARD IS IT FOR YOU TO PAY FOR THE VERY BASICS LIKE FOOD, HOUSING, MEDICAL CARE, AND HEATING?: NOT HARD AT ALL

## 2024-04-02 SDOH — ECONOMIC STABILITY: FOOD INSECURITY: WITHIN THE PAST 12 MONTHS, YOU WORRIED THAT YOUR FOOD WOULD RUN OUT BEFORE YOU GOT MONEY TO BUY MORE.: NEVER TRUE

## 2024-04-02 ASSESSMENT — PATIENT HEALTH QUESTIONNAIRE - PHQ9
SUM OF ALL RESPONSES TO PHQ9 QUESTIONS 1 & 2: 0
8. MOVING OR SPEAKING SO SLOWLY THAT OTHER PEOPLE COULD HAVE NOTICED. OR THE OPPOSITE, BEING SO FIGETY OR RESTLESS THAT YOU HAVE BEEN MOVING AROUND A LOT MORE THAN USUAL: NOT AT ALL
9. THOUGHTS THAT YOU WOULD BE BETTER OFF DEAD, OR OF HURTING YOURSELF: NOT AT ALL
7. TROUBLE CONCENTRATING ON THINGS, SUCH AS READING THE NEWSPAPER OR WATCHING TELEVISION: NOT AT ALL
10. IF YOU CHECKED OFF ANY PROBLEMS, HOW DIFFICULT HAVE THESE PROBLEMS MADE IT FOR YOU TO DO YOUR WORK, TAKE CARE OF THINGS AT HOME, OR GET ALONG WITH OTHER PEOPLE: NOT DIFFICULT AT ALL
4. FEELING TIRED OR HAVING LITTLE ENERGY: NOT AT ALL
3. TROUBLE FALLING OR STAYING ASLEEP: NOT AT ALL
6. FEELING BAD ABOUT YOURSELF - OR THAT YOU ARE A FAILURE OR HAVE LET YOURSELF OR YOUR FAMILY DOWN: NOT AT ALL
5. POOR APPETITE OR OVEREATING: NOT AT ALL
SUM OF ALL RESPONSES TO PHQ QUESTIONS 1-9: 0
1. LITTLE INTEREST OR PLEASURE IN DOING THINGS: NOT AT ALL
SUM OF ALL RESPONSES TO PHQ QUESTIONS 1-9: 0
2. FEELING DOWN, DEPRESSED OR HOPELESS: NOT AT ALL

## 2024-04-02 NOTE — ED PROVIDER NOTES
cerebral infarction 2014    slight weakness in left arm    Wears dentures     full set    Wears glasses          Records Reviewed (External and source): Reviewed patient's previous PCP notes.     Disposition Considerations (include 1 Tests not done, Admit vs D/C, Shared Decision Making, Pt Expectation of Test or Tx.): Consider obtaining COVID or flu testing does not change patient management disposition.     Admission to the hospital considered but patient's symptoms are improving.  Vital signs and testing performed is reassuring.  Based on this patient is appropriate for outpatient management.  No indication for admission at this time.     Symptomatic treatment with expectant management discussed with the patient and/or family member or surrogates present and they are amenable to treatment plan and outpatient follow-up.  Strict return precautions were discussed with the patient and those present.  All parties involved were informed that condition may persist or worsen in which case they may then require inpatient treatment, currently there is no indication.  They demonstrated understanding of when to return to the emergency department for new or worsening symptoms.      I am the Primary Clinician of Record.    FINAL IMPRESSION      1. Lower abdominal pain          DISPOSITION/PLAN     DISPOSITION Decision To Discharge 03/31/2024 04:00:54 AM      PATIENT REFERRED TO:  David Lopez MD  [address] [facility]  [address]  [facility] [address]  [phone]    Schedule an appointment as soon as possible for a visit   As needed      DISCHARGE MEDICATIONS:  Discharge Medication List as of 3/31/2024  4:22 AM        START taking these medications    Details   dicyclomine (BENTYL) 10 MG capsule Take 1 capsule by mouth 4 times daily (before meals and nightly), Disp-360 capsule, R-0Normal      !! pantoprazole (PROTONIX) 40 MG tablet Take 1 tablet by mouth every morning (before breakfast), Disp-90 tablet, R-1Normal

## 2024-04-02 NOTE — PROGRESS NOTES
Refillable tank, Pre-filled pod   Substance Use Topics    Alcohol use: Not Currently     Comment: last drink 2015    Drug use: Yes     Types: Marijuana (Weed)     Comment: medical luke card       Review of Systems    Physical Exam  Vitals and nursing note reviewed.   Constitutional:       General: He is not in acute distress.     Appearance: Normal appearance. He is well-developed. He is not diaphoretic.   Cardiovascular:      Rate and Rhythm: Normal rate and regular rhythm.      Pulses: Normal pulses.      Heart sounds: Normal heart sounds. No murmur heard.  Pulmonary:      Effort: Pulmonary effort is normal. No respiratory distress.      Breath sounds: Normal breath sounds. No wheezing or rales.   Abdominal:      General: Abdomen is flat. Bowel sounds are normal. There is no distension.      Tenderness: There is abdominal tenderness (significant diffuse generalized tenderness).   Musculoskeletal:      Cervical back: Normal range of motion.      Right lower leg: No edema.      Left lower leg: No edema.   Skin:     General: Skin is warm and dry.   Neurological:      General: No focal deficit present.      Mental Status: He is alert and oriented to person, place, and time. Mental status is at baseline.   Psychiatric:         Mood and Affect: Mood normal.         Behavior: Behavior normal.         Thought Content: Thought content normal.         Judgment: Judgment normal.         ASSESSMENT/PLAN:     Diagnosis Orders   1. Generalized abdominal pain  TSH    5-HYDROXYINDOLEACETIC ACID (HIAA) URINE    Sedimentation Rate    YANET Reflex to Antibody Cascade      2. Hematochezia  TSH    5-HYDROXYINDOLEACETIC ACID (HIAA) URINE    Sedimentation Rate    YANET Reflex to Antibody Cascade        Cause is not really clear.  Check for hyperthyroid, carcinoid, autoimmune.  Vascular?  Unlikely based on age and lack of known atherosclerotic burden.    Zofran helps more than Reglan, but only for nausea.  Nothing is helping cramping

## 2024-04-03 LAB — ANA SER QL IA: NEGATIVE

## 2024-04-04 LAB
5HIAA & CREATININE UR-IMP: NORMAL
5OH-INDOLEACETATE 24H UR-MCNC: 2.5 MG/L
5OH-INDOLEACETATE 24H UR-MRATE: NORMAL MG/D (ref 0–15)
5OH-INDOLEACETATE/CREAT 24H UR: 5 MG/GCR (ref 0–14)
COLLECT DURATION TIME SPEC: NORMAL H
CREAT 24H UR-MCNC: 47 MG/DL
CREAT 24H UR-MRATE: NORMAL MG/D (ref 800–2100)
SPECIMEN VOL ?TM UR: NORMAL ML

## 2024-04-08 DIAGNOSIS — R10.84 GENERALIZED ABDOMINAL PAIN: ICD-10-CM

## 2024-04-08 RX ORDER — OXYCODONE HYDROCHLORIDE 5 MG/1
5 TABLET ORAL EVERY 6 HOURS PRN
Qty: 28 TABLET | Refills: 0 | Status: SHIPPED | OUTPATIENT
Start: 2024-04-09 | End: 2024-04-16

## 2024-04-11 ENCOUNTER — APPOINTMENT (OUTPATIENT)
Dept: GENERAL RADIOLOGY | Age: 54
DRG: 379 | End: 2024-04-11
Payer: COMMERCIAL

## 2024-04-11 ENCOUNTER — APPOINTMENT (OUTPATIENT)
Dept: CT IMAGING | Age: 54
DRG: 379 | End: 2024-04-11
Payer: COMMERCIAL

## 2024-04-11 ENCOUNTER — HOSPITAL ENCOUNTER (INPATIENT)
Age: 54
LOS: 2 days | Discharge: HOME | DRG: 379 | End: 2024-04-13
Attending: INTERNAL MEDICINE | Admitting: INTERNAL MEDICINE
Payer: COMMERCIAL

## 2024-04-11 DIAGNOSIS — K92.0 HEMATEMESIS WITH NAUSEA: ICD-10-CM

## 2024-04-11 DIAGNOSIS — R10.84 GENERALIZED ABDOMINAL PAIN: Primary | ICD-10-CM

## 2024-04-11 PROBLEM — K92.2 ACUTE GI BLEEDING: Status: ACTIVE | Noted: 2024-04-11

## 2024-04-11 LAB
ALBUMIN SERPL-MCNC: 3.6 G/DL (ref 3.4–5)
ALBUMIN/GLOB SERPL: 1.5 {RATIO} (ref 1.1–2.2)
ALP SERPL-CCNC: 91 U/L (ref 40–129)
ALT SERPL-CCNC: 8 U/L (ref 10–40)
ANION GAP SERPL CALCULATED.3IONS-SCNC: 8 MMOL/L (ref 3–16)
AST SERPL-CCNC: 14 U/L (ref 15–37)
BACTERIA URNS QL MICRO: NORMAL /HPF
BASOPHILS # BLD: 0.2 K/UL (ref 0–0.2)
BASOPHILS NFR BLD: 2.1 %
BILIRUB SERPL-MCNC: 0.5 MG/DL (ref 0–1)
BILIRUB UR QL STRIP.AUTO: NEGATIVE
BUN SERPL-MCNC: 9 MG/DL (ref 7–20)
CALCIUM SERPL-MCNC: 8.6 MG/DL (ref 8.3–10.6)
CHLORIDE SERPL-SCNC: 104 MMOL/L (ref 99–110)
CLARITY UR: ABNORMAL
CO2 SERPL-SCNC: 26 MMOL/L (ref 21–32)
COLOR UR: YELLOW
CREAT SERPL-MCNC: 0.8 MG/DL (ref 0.9–1.3)
DEPRECATED RDW RBC AUTO: 15.7 % (ref 12.4–15.4)
EOSINOPHIL # BLD: 0.1 K/UL (ref 0–0.6)
EOSINOPHIL NFR BLD: 1.6 %
EPI CELLS #/AREA URNS AUTO: 0 /HPF (ref 0–5)
GFR SERPLBLD CREATININE-BSD FMLA CKD-EPI: >90 ML/MIN/{1.73_M2}
GLUCOSE SERPL-MCNC: 92 MG/DL (ref 70–99)
GLUCOSE UR STRIP.AUTO-MCNC: NEGATIVE MG/DL
HCT VFR BLD AUTO: 36.7 % (ref 40.5–52.5)
HGB BLD-MCNC: 12.3 G/DL (ref 13.5–17.5)
HGB UR QL STRIP.AUTO: NEGATIVE
HYALINE CASTS #/AREA URNS AUTO: 0 /LPF (ref 0–8)
INR PPP: 1.16 (ref 0.85–1.15)
KETONES UR STRIP.AUTO-MCNC: NEGATIVE MG/DL
LEUKOCYTE ESTERASE UR QL STRIP.AUTO: ABNORMAL
LIPASE SERPL-CCNC: 17 U/L (ref 13–60)
LYMPHOCYTES # BLD: 2.6 K/UL (ref 1–5.1)
LYMPHOCYTES NFR BLD: 31.4 %
MCH RBC QN AUTO: 28.6 PG (ref 26–34)
MCHC RBC AUTO-ENTMCNC: 33.5 G/DL (ref 31–36)
MCV RBC AUTO: 85.3 FL (ref 80–100)
MONOCYTES # BLD: 0.8 K/UL (ref 0–1.3)
MONOCYTES NFR BLD: 9.3 %
NEUTROPHILS # BLD: 4.5 K/UL (ref 1.7–7.7)
NEUTROPHILS NFR BLD: 55.6 %
NITRITE UR QL STRIP.AUTO: NEGATIVE
PH UR STRIP.AUTO: 6.5 [PH] (ref 5–8)
PLATELET # BLD AUTO: 328 K/UL (ref 135–450)
PMV BLD AUTO: 7.9 FL (ref 5–10.5)
POTASSIUM SERPL-SCNC: 3.8 MMOL/L (ref 3.5–5.1)
PROT SERPL-MCNC: 6 G/DL (ref 6.4–8.2)
PROT UR STRIP.AUTO-MCNC: NEGATIVE MG/DL
PROTHROMBIN TIME: 15 SEC (ref 11.9–14.9)
RBC # BLD AUTO: 4.3 M/UL (ref 4.2–5.9)
RBC CLUMPS #/AREA URNS AUTO: 2 /HPF (ref 0–4)
SODIUM SERPL-SCNC: 138 MMOL/L (ref 136–145)
SP GR UR STRIP.AUTO: 1.02 (ref 1–1.03)
UA COMPLETE W REFLEX CULTURE PNL UR: ABNORMAL
UA DIPSTICK W REFLEX MICRO PNL UR: YES
URN SPEC COLLECT METH UR: ABNORMAL
UROBILINOGEN UR STRIP-ACNC: 1 E.U./DL
WBC # BLD AUTO: 8.2 K/UL (ref 4–11)
WBC #/AREA URNS AUTO: 3 /HPF (ref 0–5)

## 2024-04-11 PROCEDURE — 81001 URINALYSIS AUTO W/SCOPE: CPT

## 2024-04-11 PROCEDURE — 99285 EMERGENCY DEPT VISIT HI MDM: CPT

## 2024-04-11 PROCEDURE — 1200000000 HC SEMI PRIVATE

## 2024-04-11 PROCEDURE — 2500000003 HC RX 250 WO HCPCS: Performed by: NURSE PRACTITIONER

## 2024-04-11 PROCEDURE — 6360000004 HC RX CONTRAST MEDICATION: Performed by: NURSE PRACTITIONER

## 2024-04-11 PROCEDURE — 96375 TX/PRO/DX INJ NEW DRUG ADDON: CPT

## 2024-04-11 PROCEDURE — C9113 INJ PANTOPRAZOLE SODIUM, VIA: HCPCS | Performed by: NURSE PRACTITIONER

## 2024-04-11 PROCEDURE — 83690 ASSAY OF LIPASE: CPT

## 2024-04-11 PROCEDURE — 85610 PROTHROMBIN TIME: CPT

## 2024-04-11 PROCEDURE — 71045 X-RAY EXAM CHEST 1 VIEW: CPT

## 2024-04-11 PROCEDURE — 85025 COMPLETE CBC W/AUTO DIFF WBC: CPT

## 2024-04-11 PROCEDURE — A4216 STERILE WATER/SALINE, 10 ML: HCPCS | Performed by: NURSE PRACTITIONER

## 2024-04-11 PROCEDURE — 74177 CT ABD & PELVIS W/CONTRAST: CPT

## 2024-04-11 PROCEDURE — 6360000002 HC RX W HCPCS: Performed by: NURSE PRACTITIONER

## 2024-04-11 PROCEDURE — 84484 ASSAY OF TROPONIN QUANT: CPT

## 2024-04-11 PROCEDURE — 2580000003 HC RX 258: Performed by: NURSE PRACTITIONER

## 2024-04-11 PROCEDURE — 96374 THER/PROPH/DIAG INJ IV PUSH: CPT

## 2024-04-11 PROCEDURE — 80053 COMPREHEN METABOLIC PANEL: CPT

## 2024-04-11 RX ORDER — PANTOPRAZOLE SODIUM 40 MG/10ML
80 INJECTION, POWDER, LYOPHILIZED, FOR SOLUTION INTRAVENOUS ONCE
Status: COMPLETED | OUTPATIENT
Start: 2024-04-11 | End: 2024-04-11

## 2024-04-11 RX ORDER — MORPHINE SULFATE 2 MG/ML
2 INJECTION, SOLUTION INTRAMUSCULAR; INTRAVENOUS
Status: DISCONTINUED | OUTPATIENT
Start: 2024-04-11 | End: 2024-04-12

## 2024-04-11 RX ORDER — OXYCODONE HYDROCHLORIDE 5 MG/1
5 TABLET ORAL EVERY 6 HOURS PRN
Status: CANCELLED | OUTPATIENT
Start: 2024-04-11

## 2024-04-11 RX ORDER — PANTOPRAZOLE SODIUM 40 MG/1
40 TABLET, DELAYED RELEASE ORAL 2 TIMES DAILY
Status: CANCELLED | OUTPATIENT
Start: 2024-04-12

## 2024-04-11 RX ORDER — ONDANSETRON 2 MG/ML
4 INJECTION INTRAMUSCULAR; INTRAVENOUS ONCE
Status: COMPLETED | OUTPATIENT
Start: 2024-04-11 | End: 2024-04-11

## 2024-04-11 RX ORDER — MORPHINE SULFATE 4 MG/ML
4 INJECTION, SOLUTION INTRAMUSCULAR; INTRAVENOUS
Status: DISCONTINUED | OUTPATIENT
Start: 2024-04-11 | End: 2024-04-12

## 2024-04-11 RX ORDER — MORPHINE SULFATE 2 MG/ML
2 INJECTION, SOLUTION INTRAMUSCULAR; INTRAVENOUS ONCE
Status: COMPLETED | OUTPATIENT
Start: 2024-04-11 | End: 2024-04-11

## 2024-04-11 RX ORDER — GABAPENTIN 300 MG/1
600 CAPSULE ORAL 3 TIMES DAILY
Status: DISCONTINUED | OUTPATIENT
Start: 2024-04-12 | End: 2024-04-13 | Stop reason: HOSPADM

## 2024-04-11 RX ADMIN — ONDANSETRON 4 MG: 2 INJECTION INTRAMUSCULAR; INTRAVENOUS at 21:27

## 2024-04-11 RX ADMIN — MORPHINE SULFATE 2 MG: 2 INJECTION, SOLUTION INTRAMUSCULAR; INTRAVENOUS at 21:27

## 2024-04-11 RX ADMIN — FAMOTIDINE 20 MG: 10 INJECTION, SOLUTION INTRAVENOUS at 21:28

## 2024-04-11 RX ADMIN — PANTOPRAZOLE SODIUM 80 MG: 40 INJECTION, POWDER, FOR SOLUTION INTRAVENOUS at 21:27

## 2024-04-11 RX ADMIN — IOPAMIDOL 75 ML: 755 INJECTION, SOLUTION INTRAVENOUS at 21:47

## 2024-04-11 ASSESSMENT — PAIN - FUNCTIONAL ASSESSMENT: PAIN_FUNCTIONAL_ASSESSMENT: ACTIVITIES ARE NOT PREVENTED

## 2024-04-11 ASSESSMENT — ENCOUNTER SYMPTOMS
VOMITING: 1
NAUSEA: 1
BLOOD IN STOOL: 1
ABDOMINAL PAIN: 1
RESPIRATORY NEGATIVE: 1

## 2024-04-11 ASSESSMENT — PAIN DESCRIPTION - DESCRIPTORS: DESCRIPTORS: DISCOMFORT

## 2024-04-11 ASSESSMENT — PAIN DESCRIPTION - ONSET: ONSET: ON-GOING

## 2024-04-11 ASSESSMENT — PAIN DESCRIPTION - LOCATION: LOCATION: ABDOMEN

## 2024-04-11 ASSESSMENT — PAIN DESCRIPTION - FREQUENCY: FREQUENCY: CONTINUOUS

## 2024-04-11 ASSESSMENT — PAIN DESCRIPTION - ORIENTATION: ORIENTATION: MID

## 2024-04-11 ASSESSMENT — PAIN DESCRIPTION - PAIN TYPE: TYPE: ACUTE PAIN

## 2024-04-11 ASSESSMENT — PAIN SCALES - GENERAL
PAINLEVEL_OUTOF10: 9
PAINLEVEL_OUTOF10: 9

## 2024-04-11 NOTE — ED TRIAGE NOTES
Patient to the ER with complaints of mid abdominal pain that has been going on for over 2 weeks with bloody stools.  Patient has an endoscopy scheduled for Tuesday but he is feeling so bad today he wanted to come here to be seen.   He reports extreme weakness of both legs that is new for him as of today.  He says he is unable to walk without holding onto a cane or another person to steady himself. He also reports extreme fatigue and says he has been sleeping all day.

## 2024-04-12 PROBLEM — R10.84 GENERALIZED ABDOMINAL PAIN: Status: ACTIVE | Noted: 2024-04-12

## 2024-04-12 PROBLEM — K92.1 HEMATOCHEZIA: Status: ACTIVE | Noted: 2024-04-12

## 2024-04-12 PROBLEM — K92.0 HEMATEMESIS WITH NAUSEA: Status: ACTIVE | Noted: 2024-04-12

## 2024-04-12 PROBLEM — S30.1XXA ABDOMINAL WALL SEROMA: Status: ACTIVE | Noted: 2024-04-12

## 2024-04-12 LAB
ALBUMIN SERPL-MCNC: 3.5 G/DL (ref 3.4–5)
ALBUMIN/GLOB SERPL: 1.8 {RATIO} (ref 1.1–2.2)
ALP SERPL-CCNC: 87 U/L (ref 40–129)
ALT SERPL-CCNC: 7 U/L (ref 10–40)
ANION GAP SERPL CALCULATED.3IONS-SCNC: 10 MMOL/L (ref 3–16)
AST SERPL-CCNC: 11 U/L (ref 15–37)
BILIRUB SERPL-MCNC: 0.5 MG/DL (ref 0–1)
BUN SERPL-MCNC: 10 MG/DL (ref 7–20)
CALCIUM SERPL-MCNC: 8.4 MG/DL (ref 8.3–10.6)
CHLORIDE SERPL-SCNC: 106 MMOL/L (ref 99–110)
CO2 SERPL-SCNC: 25 MMOL/L (ref 21–32)
CREAT SERPL-MCNC: 0.8 MG/DL (ref 0.9–1.3)
GFR SERPLBLD CREATININE-BSD FMLA CKD-EPI: >90 ML/MIN/{1.73_M2}
GLUCOSE SERPL-MCNC: 74 MG/DL (ref 70–99)
LIPASE SERPL-CCNC: 18 U/L (ref 13–60)
POTASSIUM SERPL-SCNC: 4 MMOL/L (ref 3.5–5.1)
PROT SERPL-MCNC: 5.5 G/DL (ref 6.4–8.2)
REPORT: NORMAL
RESP PATH DNA+RNA PNL NPH NAA+NON-PROBE: NORMAL
SODIUM SERPL-SCNC: 141 MMOL/L (ref 136–145)
TROPONIN, HIGH SENSITIVITY: 10 NG/L (ref 0–22)

## 2024-04-12 PROCEDURE — 6360000002 HC RX W HCPCS: Performed by: INTERNAL MEDICINE

## 2024-04-12 PROCEDURE — 97165 OT EVAL LOW COMPLEX 30 MIN: CPT

## 2024-04-12 PROCEDURE — 2580000003 HC RX 258: Performed by: INTERNAL MEDICINE

## 2024-04-12 PROCEDURE — 93005 ELECTROCARDIOGRAM TRACING: CPT | Performed by: INTERNAL MEDICINE

## 2024-04-12 PROCEDURE — 97161 PT EVAL LOW COMPLEX 20 MIN: CPT

## 2024-04-12 PROCEDURE — C9113 INJ PANTOPRAZOLE SODIUM, VIA: HCPCS | Performed by: INTERNAL MEDICINE

## 2024-04-12 PROCEDURE — 80053 COMPREHEN METABOLIC PANEL: CPT

## 2024-04-12 PROCEDURE — 97530 THERAPEUTIC ACTIVITIES: CPT

## 2024-04-12 PROCEDURE — 6370000000 HC RX 637 (ALT 250 FOR IP): Performed by: INTERNAL MEDICINE

## 2024-04-12 PROCEDURE — 2060000000 HC ICU INTERMEDIATE R&B

## 2024-04-12 PROCEDURE — 99222 1ST HOSP IP/OBS MODERATE 55: CPT | Performed by: SURGERY

## 2024-04-12 PROCEDURE — 36415 COLL VENOUS BLD VENIPUNCTURE: CPT

## 2024-04-12 PROCEDURE — APPSS15 APP SPLIT SHARED TIME 0-15 MINUTES: Performed by: PHYSICIAN ASSISTANT

## 2024-04-12 PROCEDURE — APPNB15 APP NON BILLABLE TIME 0-15 MINS: Performed by: PHYSICIAN ASSISTANT

## 2024-04-12 PROCEDURE — 0202U NFCT DS 22 TRGT SARS-COV-2: CPT

## 2024-04-12 PROCEDURE — 94760 N-INVAS EAR/PLS OXIMETRY 1: CPT

## 2024-04-12 PROCEDURE — 97535 SELF CARE MNGMENT TRAINING: CPT

## 2024-04-12 RX ORDER — ACETAMINOPHEN 650 MG/1
650 SUPPOSITORY RECTAL EVERY 6 HOURS PRN
Status: DISCONTINUED | OUTPATIENT
Start: 2024-04-12 | End: 2024-04-13 | Stop reason: HOSPADM

## 2024-04-12 RX ORDER — SODIUM CHLORIDE 0.9 % (FLUSH) 0.9 %
10 SYRINGE (ML) INJECTION EVERY 12 HOURS SCHEDULED
Status: DISCONTINUED | OUTPATIENT
Start: 2024-04-12 | End: 2024-04-13 | Stop reason: HOSPADM

## 2024-04-12 RX ORDER — SODIUM CHLORIDE 0.9 % (FLUSH) 0.9 %
10 SYRINGE (ML) INJECTION PRN
Status: DISCONTINUED | OUTPATIENT
Start: 2024-04-12 | End: 2024-04-13 | Stop reason: HOSPADM

## 2024-04-12 RX ORDER — TRAZODONE HYDROCHLORIDE 50 MG/1
200 TABLET ORAL NIGHTLY PRN
Status: DISCONTINUED | OUTPATIENT
Start: 2024-04-12 | End: 2024-04-13 | Stop reason: HOSPADM

## 2024-04-12 RX ORDER — ATORVASTATIN CALCIUM 40 MG/1
40 TABLET, FILM COATED ORAL
Status: DISCONTINUED | OUTPATIENT
Start: 2024-04-12 | End: 2024-04-13 | Stop reason: HOSPADM

## 2024-04-12 RX ORDER — PANTOPRAZOLE SODIUM 40 MG/10ML
40 INJECTION, POWDER, LYOPHILIZED, FOR SOLUTION INTRAVENOUS 2 TIMES DAILY
Status: DISCONTINUED | OUTPATIENT
Start: 2024-04-12 | End: 2024-04-13 | Stop reason: HOSPADM

## 2024-04-12 RX ORDER — ONDANSETRON 2 MG/ML
4 INJECTION INTRAMUSCULAR; INTRAVENOUS EVERY 6 HOURS PRN
Status: DISCONTINUED | OUTPATIENT
Start: 2024-04-12 | End: 2024-04-13 | Stop reason: HOSPADM

## 2024-04-12 RX ORDER — OXYBUTYNIN CHLORIDE 5 MG/1
5 TABLET ORAL DAILY
COMMUNITY

## 2024-04-12 RX ORDER — NICOTINE 21 MG/24HR
1 PATCH, TRANSDERMAL 24 HOURS TRANSDERMAL DAILY
Status: DISCONTINUED | OUTPATIENT
Start: 2024-04-12 | End: 2024-04-13 | Stop reason: HOSPADM

## 2024-04-12 RX ORDER — MAGNESIUM SULFATE IN WATER 40 MG/ML
2000 INJECTION, SOLUTION INTRAVENOUS PRN
Status: DISCONTINUED | OUTPATIENT
Start: 2024-04-12 | End: 2024-04-13 | Stop reason: HOSPADM

## 2024-04-12 RX ORDER — CLOPIDOGREL BISULFATE 75 MG/1
75 TABLET ORAL DAILY
Status: DISCONTINUED | OUTPATIENT
Start: 2024-04-12 | End: 2024-04-13 | Stop reason: HOSPADM

## 2024-04-12 RX ORDER — POTASSIUM CHLORIDE 20 MEQ/1
40 TABLET, EXTENDED RELEASE ORAL PRN
Status: DISCONTINUED | OUTPATIENT
Start: 2024-04-12 | End: 2024-04-13 | Stop reason: HOSPADM

## 2024-04-12 RX ORDER — PROMETHAZINE HYDROCHLORIDE 25 MG/1
12.5 TABLET ORAL EVERY 6 HOURS PRN
Status: DISCONTINUED | OUTPATIENT
Start: 2024-04-12 | End: 2024-04-13 | Stop reason: HOSPADM

## 2024-04-12 RX ORDER — SODIUM CHLORIDE 9 MG/ML
INJECTION, SOLUTION INTRAVENOUS PRN
Status: DISCONTINUED | OUTPATIENT
Start: 2024-04-12 | End: 2024-04-13 | Stop reason: HOSPADM

## 2024-04-12 RX ORDER — FLUOXETINE HYDROCHLORIDE 20 MG/1
20 CAPSULE ORAL DAILY
Status: DISCONTINUED | OUTPATIENT
Start: 2024-04-12 | End: 2024-04-13 | Stop reason: HOSPADM

## 2024-04-12 RX ORDER — SODIUM CHLORIDE 9 MG/ML
INJECTION, SOLUTION INTRAVENOUS CONTINUOUS
Status: ACTIVE | OUTPATIENT
Start: 2024-04-12 | End: 2024-04-12

## 2024-04-12 RX ORDER — OXYBUTYNIN CHLORIDE 5 MG/1
5 TABLET, EXTENDED RELEASE ORAL DAILY
Status: DISCONTINUED | OUTPATIENT
Start: 2024-04-12 | End: 2024-04-13 | Stop reason: HOSPADM

## 2024-04-12 RX ORDER — POTASSIUM CHLORIDE 7.45 MG/ML
10 INJECTION INTRAVENOUS PRN
Status: DISCONTINUED | OUTPATIENT
Start: 2024-04-12 | End: 2024-04-13 | Stop reason: HOSPADM

## 2024-04-12 RX ORDER — ACETAMINOPHEN 325 MG/1
650 TABLET ORAL EVERY 6 HOURS PRN
Status: DISCONTINUED | OUTPATIENT
Start: 2024-04-12 | End: 2024-04-13 | Stop reason: HOSPADM

## 2024-04-12 RX ORDER — LANOLIN ALCOHOL/MO/W.PET/CERES
3 CREAM (GRAM) TOPICAL NIGHTLY
Status: DISCONTINUED | OUTPATIENT
Start: 2024-04-12 | End: 2024-04-13 | Stop reason: HOSPADM

## 2024-04-12 RX ADMIN — OXYBUTYNIN CHLORIDE 5 MG: 5 TABLET, EXTENDED RELEASE ORAL at 09:40

## 2024-04-12 RX ADMIN — MORPHINE SULFATE 4 MG: 4 INJECTION, SOLUTION INTRAMUSCULAR; INTRAVENOUS at 07:57

## 2024-04-12 RX ADMIN — GABAPENTIN 600 MG: 300 CAPSULE ORAL at 14:01

## 2024-04-12 RX ADMIN — FLUOXETINE HYDROCHLORIDE 20 MG: 20 CAPSULE ORAL at 09:40

## 2024-04-12 RX ADMIN — GABAPENTIN 600 MG: 300 CAPSULE ORAL at 09:40

## 2024-04-12 RX ADMIN — SODIUM CHLORIDE, PRESERVATIVE FREE 10 ML: 5 INJECTION INTRAVENOUS at 09:43

## 2024-04-12 RX ADMIN — TRAZODONE HYDROCHLORIDE 200 MG: 50 TABLET ORAL at 21:05

## 2024-04-12 RX ADMIN — GABAPENTIN 600 MG: 300 CAPSULE ORAL at 00:05

## 2024-04-12 RX ADMIN — GABAPENTIN 600 MG: 300 CAPSULE ORAL at 21:05

## 2024-04-12 RX ADMIN — HYDROMORPHONE HYDROCHLORIDE 0.5 MG: 1 INJECTION, SOLUTION INTRAMUSCULAR; INTRAVENOUS; SUBCUTANEOUS at 14:02

## 2024-04-12 RX ADMIN — HYDROMORPHONE HYDROCHLORIDE 0.5 MG: 1 INJECTION, SOLUTION INTRAMUSCULAR; INTRAVENOUS; SUBCUTANEOUS at 09:47

## 2024-04-12 RX ADMIN — SODIUM CHLORIDE: 9 INJECTION, SOLUTION INTRAVENOUS at 02:17

## 2024-04-12 RX ADMIN — Medication 3 MG: at 21:05

## 2024-04-12 RX ADMIN — MORPHINE SULFATE 4 MG: 4 INJECTION, SOLUTION INTRAMUSCULAR; INTRAVENOUS at 05:18

## 2024-04-12 RX ADMIN — Medication 3 MG: at 02:08

## 2024-04-12 RX ADMIN — MORPHINE SULFATE 4 MG: 4 INJECTION, SOLUTION INTRAMUSCULAR; INTRAVENOUS at 00:07

## 2024-04-12 RX ADMIN — CLOPIDOGREL BISULFATE 75 MG: 75 TABLET ORAL at 16:24

## 2024-04-12 RX ADMIN — ATORVASTATIN CALCIUM 40 MG: 40 TABLET, FILM COATED ORAL at 02:08

## 2024-04-12 RX ADMIN — PANTOPRAZOLE SODIUM 40 MG: 40 INJECTION, POWDER, FOR SOLUTION INTRAVENOUS at 09:40

## 2024-04-12 RX ADMIN — HYDROMORPHONE HYDROCHLORIDE 0.25 MG: 1 INJECTION, SOLUTION INTRAMUSCULAR; INTRAVENOUS; SUBCUTANEOUS at 21:05

## 2024-04-12 RX ADMIN — PANTOPRAZOLE SODIUM 40 MG: 40 INJECTION, POWDER, FOR SOLUTION INTRAVENOUS at 21:05

## 2024-04-12 RX ADMIN — Medication 10 ML: at 07:58

## 2024-04-12 RX ADMIN — SODIUM CHLORIDE, PRESERVATIVE FREE 10 ML: 5 INJECTION INTRAVENOUS at 21:06

## 2024-04-12 RX ADMIN — HYDROMORPHONE HYDROCHLORIDE 0.25 MG: 1 INJECTION, SOLUTION INTRAMUSCULAR; INTRAVENOUS; SUBCUTANEOUS at 18:03

## 2024-04-12 RX ADMIN — TRAZODONE HYDROCHLORIDE 200 MG: 50 TABLET ORAL at 02:08

## 2024-04-12 RX ADMIN — ATORVASTATIN CALCIUM 40 MG: 40 TABLET, FILM COATED ORAL at 21:05

## 2024-04-12 RX ADMIN — MORPHINE SULFATE 4 MG: 4 INJECTION, SOLUTION INTRAMUSCULAR; INTRAVENOUS at 02:08

## 2024-04-12 ASSESSMENT — PAIN SCALES - GENERAL
PAINLEVEL_OUTOF10: 8
PAINLEVEL_OUTOF10: 7
PAINLEVEL_OUTOF10: 9
PAINLEVEL_OUTOF10: 8
PAINLEVEL_OUTOF10: 8
PAINLEVEL_OUTOF10: 7
PAINLEVEL_OUTOF10: 8
PAINLEVEL_OUTOF10: 7
PAINLEVEL_OUTOF10: 8
PAINLEVEL_OUTOF10: 7
PAINLEVEL_OUTOF10: 7
PAINLEVEL_OUTOF10: 8

## 2024-04-12 ASSESSMENT — PAIN DESCRIPTION - DESCRIPTORS
DESCRIPTORS: SHARP
DESCRIPTORS: SHARP
DESCRIPTORS: STABBING;SHARP
DESCRIPTORS: SHARP
DESCRIPTORS: SHARP;CRAMPING
DESCRIPTORS: SHARP;CRAMPING

## 2024-04-12 ASSESSMENT — PAIN - FUNCTIONAL ASSESSMENT
PAIN_FUNCTIONAL_ASSESSMENT: PREVENTS OR INTERFERES WITH MANY ACTIVE NOT PASSIVE ACTIVITIES
PAIN_FUNCTIONAL_ASSESSMENT: PREVENTS OR INTERFERES SOME ACTIVE ACTIVITIES AND ADLS

## 2024-04-12 ASSESSMENT — PAIN DESCRIPTION - ORIENTATION
ORIENTATION: MID
ORIENTATION: LOWER
ORIENTATION: LOWER
ORIENTATION: MID
ORIENTATION: LOWER

## 2024-04-12 ASSESSMENT — PAIN DESCRIPTION - LOCATION
LOCATION: ABDOMEN

## 2024-04-12 ASSESSMENT — PAIN DESCRIPTION - PAIN TYPE: TYPE: ACUTE PAIN

## 2024-04-12 ASSESSMENT — PAIN DESCRIPTION - FREQUENCY: FREQUENCY: CONTINUOUS

## 2024-04-12 ASSESSMENT — PAIN DESCRIPTION - ONSET: ONSET: ON-GOING

## 2024-04-12 NOTE — PROGRESS NOTES
4 Eyes Skin Assessment     NAME:  Davis Payton  YOB: 1970  MEDICAL RECORD NUMBER:  [id number]    The patient is being assessed for  Admission    I agree that at least one RN has performed a thorough Head to Toe Skin Assessment on the patient. ALL assessment sites listed below have been assessed.      Areas assessed by both nurses:    Head, Face, Ears, Shoulders, Back, Chest, Arms, Elbows, Hands, Sacrum. Buttock, Coccyx, Ischium, and Legs. Feet and Heels        Does the Patient have a Wound? No noted wound(s)       Andrei Prevention initiated by RN: No  Wound Care Orders initiated by RN: No    Pressure Injury (Stage 3,4, Unstageable, DTI, NWPT, and Complex wounds) if present, place Wound referral order by RN under : No    New Ostomies, if present place, Ostomy referral order under : No     Nurse 1 eSignature: Electronically signed by Chris Ruiz RN on 4/12/24 at 2:57 AM EDT    **SHARE this note so that the co-signing nurse can place an eSignature**    Nurse 2 eSignature: Electronically signed by Rachel Long RN on 4/12/24 at 2:58 AM EDT

## 2024-04-12 NOTE — PROGRESS NOTES
precautions in place, Call light within reach, Left in bed  Restraints  Restraints Initially in Place: No     Restrictions  Restrictions/Precautions  Restrictions/Precautions: Fall Risk     Subjective   General  Chart Reviewed: Yes  Additional Pertinent Hx: \"Davis Payton is a 53 y.o. male who presents to the emergency department PMHx: Gastric bypass Darrian-en-Y, sleep apnea, exocrine pancreatic insufficiency, chronic pain syndrome, alcoholism history, and recovery iron deficiency anemia, B12 anemia spleen ectomy, depression, sleep at.\"  Response To Previous Treatment: Not applicable  Referring Practitioner: Tae Thorpe DO  Referral Date : 04/12/24  Diagnosis: Abdominal pain; Fall; Anemia  Follows Commands: Within Functional Limits  Subjective  Subjective: Pt agreeable to activity.  Reports a fall at home, likely d/t weakness.  C/o generalized pain.         Social/Functional History  Social/Functional History  Lives With: Spouse, Family  Type of Home: House  Home Layout: Two level, Bed/Bath upstairs  Home Access: Stairs to enter with rails  Entrance Stairs - Number of Steps: 6-7  Bathroom Shower/Tub: Walk-in shower  Bathroom Toilet: Standard  Bathroom Equipment: Shower chair  Home Equipment: Cane, Rollator, Walker, rolling  Has the patient had two or more falls in the past year or any fall with injury in the past year?: Yes  ADL Assistance: Independent  Ambulation Assistance: Independent  Transfer Assistance: Independent  Active : Yes    Vision/Hearing  Vision  Vision: Within Functional Limits  Hearing  Hearing: Within functional limits      Cognition   Orientation  Overall Orientation Status: Within Normal Limits     Objective     AROM RLE (degrees)  RLE AROM: WFL  AROM LLE (degrees)  LLE AROM : WFL  AROM RUE (degrees)  RUE AROM : WFL  AROM LUE (degrees)  LUE AROM : WFL    Strength RLE  Strength RLE: Exception  Comment: Hip Flex, Knee Ext, and Ankle DF grossly 3+/5  Strength LLE  Strength LLE:  Low Complexity Evaluation    Bhavin Barton, PT   Electronically signed by Bhavin Barton, PT [id number] on 4/12/2024 at 8:39 AM

## 2024-04-12 NOTE — ED PROVIDER NOTES
[facility] EMERGENCY DEPARTMENT  [address] [facility] [address]  Dept: [phone]  Loc: [phone]  eMERGENCYdEPARTMENT eNCOUnter      Pt Name: Davis Payton  MRN: [id number]  Birthdate 1970  Date of evaluation: 4/11/2024  Provider:LILLIE Louis - CNP    CHIEF COMPLAINT       Chief Complaint   Patient presents with    Abdominal Pain    Generalized Weakness    Fatigue       CRITICAL CARE TIME       HISTORY OF PRESENT ILLNESS  (Location/Symptom, Timing/Onset, Context/Setting, Quality, Duration,Modifying Factors, Severity.)   Davis Payton is a 53 y.o. male who presents to the emergency department PMHx: Gastric bypass Darrian-en-Y, sleep apnea, exocrine pancreatic insufficiency, chronic pain syndrome, alcoholism history, and recovery iron deficiency anemia, B12 anemia spleen ectomy, depression, sleep at    Lives at home  CODE STATUS full  Anticoagulation therapy none  Social determinants: Multiple comorbid conditions young age, unemployed due to disability    HPI provided by the patient    Patient presenting to the emergency department complaining of generalized abdominal crying for several weeks.  Status post Darrian-en-Y gastric bypass many many years ago.  Has had a history of gastric ulcer.  Reports loss of appetite, crippling abdominal pain.  Supposed to see Dr. Barragan for an EGD next week.  Reports that when he is vomiting it is streaked with blood.  He is also had some intermittent streaks of blood within his stool.  Denies weight loss.  Reports that the abdominal pain and loss of appetite has caused him to become extremely weak to the point that he can even hardly get up and walk around.    Nursing Notes were reviewedand agreed with or any disagreements were addressed in the HPI.    REVIEW OF SYSTEMS    (2-9 systems for level 4, 10 or more for level 5)     Review of Systems   Constitutional:  Positive for activity change, appetite change

## 2024-04-12 NOTE — PROGRESS NOTES
Patient has been bradycardic all day, with HR in 40s and upper 30s. Dr Gerard aware and has ordered cardiology consult. Abdominal pain has been fairly well controlled with dilaudid. Patient has not had any nausea, emesis or rectal bleeding. Patient worked with PT/OT and can ambulate with a walker and standby assist.

## 2024-04-12 NOTE — PROGRESS NOTES
Pt arrived via stretcher from ED to room 1150.  Heart monitor connected and verified with [facility]. VS, assessment, and admission complete. 4 eyes assessment complete. Pt oriented to unit and room. Call light and bedside table in reach. All questions answered. Pt resting quietly in bed with no complaints or voiced needs at this time.     Electronically signed by Chris Ruiz RN on 4/12/2024 at 2:57 AM

## 2024-04-12 NOTE — ED NOTES
Report called to NORBERTO Perez for Rm [phone]. Questions addressed and report accepted. Transport to be arranged as available.

## 2024-04-12 NOTE — PLAN OF CARE
Problem: Discharge Planning  Goal: Discharge to home or other facility with appropriate resources  4/12/2024 1618 by Dina Covington RN  Outcome: Progressing     Problem: Pain  Goal: Verbalizes/displays adequate comfort level or baseline comfort level  4/12/2024 1618 by Dina Covington RN  Outcome: Progressing     Problem: Safety - Adult  Goal: Free from fall injury  4/12/2024 1618 by Dina Covington, RN  Outcome: Progressing     Problem: Skin/Tissue Integrity  Goal: Absence of new skin breakdown  Description: 1.  Monitor for areas of redness and/or skin breakdown  2.  Assess vascular access sites hourly  3.  Every 4-6 hours minimum:  Change oxygen saturation probe site  4.  Every 4-6 hours:  If on nasal continuous positive airway pressure, respiratory therapy assess nares and determine need for appliance change or resting period.  Outcome: Progressing

## 2024-04-12 NOTE — H&P
Hospital Medicine History & Physical      PCP: David Lopez MD    Date of Admission: 4/11/2024    Date of Service: Pt seen/examined on 4/11/2024    Pt seen/examined face to face on and admitted as inpatient with expected LOS to be two days but can change depending on diagnostic work up and treatment response.     Chief Complaint:    Chief Complaint   Patient presents with    Abdominal Pain    Generalized Weakness    Fatigue            ASSESSMENT AND PLAN:    Acute on chronic abdominal pain  Noted cholelithiasis on CT, appendix within normal limits and there was peripherally enhancing fluid collection in the subcu tissue in the right abdomen.  This is concerning for abscess  Surgery was consulted in the ED, appreciated  IV Zosyn initiated    Vaughn's esophagitis with symptoms  GI consulted, appreciated    Acute GI bleed,  Patient reported having bright red rectal bleeding,  Protonix 40 IV twice daily  GI consulted    Hyperlipidemia: Controlled on home Statin. Outpatient PCP follow up post-discharge  CAD: Currently controlled on home meds. No current symptoms or evidence of active signs of ischemia.   Depression: continue home medications  Anxiety: Controlled, continue home medications   GERD: Continue home medication  Insomnia: Continue medication    .Due to the above diagnosis makes the patient higher risk for morbidity and mortality requiring testing and treatment      Discussion with the primary ER physician in regards to symptoms, history, physical exam, diagnosis and treatment, collaborative decision was to admit the patient.    Diet: NPO except meds ordered    DVT Prophylaxis: lovenox    Dispo:   Expected LOS of two days      History Of Present Illness:      53 y.o. male who presented to [facility] with past medical history of alcoholism, Vaughn's esophagitis, depression, GERD presented to the ED with chief complaint of worsening abdominal pain.    Patient reports abdominal pain he has    Pending    .  CT ABDOMEN PELVIS W IV CONTRAST Additional Contrast? None   Preliminary Result   A peripherally enhancing fluid collection within the subcutaneous tissues of   the right abdominal wall is re-identified, but minimally increased since the   previous exam and most consistent with a seroma or abscess.      The appendix is within normal limits.      Cholelithiasis.      Postsurgical changes, as above.      Small amount of free fluid in the pelvis.                      Tea Thorpe, DO

## 2024-04-12 NOTE — PROGRESS NOTES
Occupational Therapy  Facility/Department: [address] PROGRESSIVE CARE  Occupational Therapy Initial Assessment and Tentative D/C      Name: Davis Payton  : 1970  MRN: [id number]  Date of Service: 2024    Discharge Recommendations: Davis Payton scored a 19/24 on the AM-PAC ADL Inpatient form. Current research shows that an AM-PAC score of 18 or greater is typically associated with a discharge to the patient's home setting.     If patient discharges prior to next session this note will serve as a discharge summary.  Please see below for the latest assessment towards goals.     Home with assist PRN  OT Equipment Recommendations  Equipment Needed: No       Patient Diagnosis(es): The primary encounter diagnosis was Generalized abdominal pain. A diagnosis of Hematemesis with nausea was also pertinent to this visit.  Past Medical History:  has a past medical history of Alcoholism (HCC), Allergic rhinitis, seasonal, Anemia, Arthritis, Vaughn's esophagus, Benign intracranial hypertension, Cancer (HCC), Carpal tunnel syndrome, Chronic pain, Depression, Ear infection, GERD (gastroesophageal reflux disease), Headache(784.0), History of blood transfusion, History of loop recorder, History of spinal fracture, History of tobacco use, Hyperlipidemia, Left wrist fracture, Migraine, Morbid obesity (HCC), Movement disorder, Onychomycosis, Sleep apnea, obstructive, Unspecified cerebral artery occlusion with cerebral infarction, Wears dentures, and Wears glasses.  Past Surgical History:  has a past surgical history that includes Gastric bypass surgery (2009); Splenectomy; Carpal tunnel release; laparotomy; Knee arthroscopy (Right, 2013); Knee arthroscopy (Right, 2012); Total knee arthroplasty (10/15/2013); Endoscopy, colon, diagnostic; Dilatation, esophagus; Abdominal exploration surgery (2016); other surgical history (Left, 2016); hernia repair (2016); Upper gastrointestinal endoscopy

## 2024-04-13 VITALS
WEIGHT: 187.8 LBS | HEART RATE: 40 BPM | BODY MASS INDEX: 25.44 KG/M2 | SYSTOLIC BLOOD PRESSURE: 109 MMHG | RESPIRATION RATE: 16 BRPM | OXYGEN SATURATION: 94 % | DIASTOLIC BLOOD PRESSURE: 69 MMHG | HEIGHT: 72 IN | TEMPERATURE: 98 F

## 2024-04-13 LAB
ALBUMIN SERPL-MCNC: 3.4 G/DL (ref 3.4–5)
ALBUMIN/GLOB SERPL: 1.8 {RATIO} (ref 1.1–2.2)
ALP SERPL-CCNC: 81 U/L (ref 40–129)
ALT SERPL-CCNC: 7 U/L (ref 10–40)
ANION GAP SERPL CALCULATED.3IONS-SCNC: 10 MMOL/L (ref 3–16)
AST SERPL-CCNC: 11 U/L (ref 15–37)
BASOPHILS # BLD: 0.1 K/UL (ref 0–0.2)
BASOPHILS NFR BLD: 2 %
BILIRUB SERPL-MCNC: 0.4 MG/DL (ref 0–1)
BUN SERPL-MCNC: 11 MG/DL (ref 7–20)
CALCIUM SERPL-MCNC: 8.6 MG/DL (ref 8.3–10.6)
CHLORIDE SERPL-SCNC: 107 MMOL/L (ref 99–110)
CO2 SERPL-SCNC: 23 MMOL/L (ref 21–32)
CREAT SERPL-MCNC: 0.9 MG/DL (ref 0.9–1.3)
DEPRECATED RDW RBC AUTO: 16.1 % (ref 12.4–15.4)
EKG ATRIAL RATE: 42 BPM
EKG DIAGNOSIS: NORMAL
EKG P AXIS: 69 DEGREES
EKG P-R INTERVAL: 148 MS
EKG Q-T INTERVAL: 470 MS
EKG QRS DURATION: 84 MS
EKG QTC CALCULATION (BAZETT): 392 MS
EKG R AXIS: 56 DEGREES
EKG T AXIS: 54 DEGREES
EKG VENTRICULAR RATE: 42 BPM
EOSINOPHIL # BLD: 0.3 K/UL (ref 0–0.6)
EOSINOPHIL NFR BLD: 4.5 %
GFR SERPLBLD CREATININE-BSD FMLA CKD-EPI: >90 ML/MIN/{1.73_M2}
GLUCOSE BLD-MCNC: 79 MG/DL (ref 70–99)
GLUCOSE SERPL-MCNC: 65 MG/DL (ref 70–99)
HCT VFR BLD AUTO: 36.4 % (ref 40.5–52.5)
HGB BLD-MCNC: 12 G/DL (ref 13.5–17.5)
LYMPHOCYTES # BLD: 3.1 K/UL (ref 1–5.1)
LYMPHOCYTES NFR BLD: 44.9 %
MCH RBC QN AUTO: 28.5 PG (ref 26–34)
MCHC RBC AUTO-ENTMCNC: 33 G/DL (ref 31–36)
MCV RBC AUTO: 86.2 FL (ref 80–100)
MONOCYTES # BLD: 0.6 K/UL (ref 0–1.3)
MONOCYTES NFR BLD: 8.6 %
NEUTROPHILS # BLD: 2.8 K/UL (ref 1.7–7.7)
NEUTROPHILS NFR BLD: 40 %
PERFORMED ON: NORMAL
PLATELET # BLD AUTO: 299 K/UL (ref 135–450)
PMV BLD AUTO: 9 FL (ref 5–10.5)
POTASSIUM SERPL-SCNC: 4.1 MMOL/L (ref 3.5–5.1)
PROT SERPL-MCNC: 5.3 G/DL (ref 6.4–8.2)
RBC # BLD AUTO: 4.22 M/UL (ref 4.2–5.9)
SODIUM SERPL-SCNC: 140 MMOL/L (ref 136–145)
WBC # BLD AUTO: 6.9 K/UL (ref 4–11)

## 2024-04-13 PROCEDURE — 85025 COMPLETE CBC W/AUTO DIFF WBC: CPT

## 2024-04-13 PROCEDURE — 6370000000 HC RX 637 (ALT 250 FOR IP): Performed by: INTERNAL MEDICINE

## 2024-04-13 PROCEDURE — 36415 COLL VENOUS BLD VENIPUNCTURE: CPT

## 2024-04-13 PROCEDURE — 6360000002 HC RX W HCPCS: Performed by: INTERNAL MEDICINE

## 2024-04-13 PROCEDURE — 2580000003 HC RX 258: Performed by: INTERNAL MEDICINE

## 2024-04-13 PROCEDURE — C9113 INJ PANTOPRAZOLE SODIUM, VIA: HCPCS | Performed by: INTERNAL MEDICINE

## 2024-04-13 PROCEDURE — 94760 N-INVAS EAR/PLS OXIMETRY 1: CPT

## 2024-04-13 PROCEDURE — 93010 ELECTROCARDIOGRAM REPORT: CPT | Performed by: INTERNAL MEDICINE

## 2024-04-13 PROCEDURE — 80053 COMPREHEN METABOLIC PANEL: CPT

## 2024-04-13 RX ORDER — SUCRALFATE 1 G/1
1 TABLET ORAL 4 TIMES DAILY
Qty: 120 TABLET | Refills: 0 | Status: SHIPPED | OUTPATIENT
Start: 2024-04-13

## 2024-04-13 RX ORDER — OXYCODONE HYDROCHLORIDE 10 MG/1
10 TABLET ORAL ONCE
Status: COMPLETED | OUTPATIENT
Start: 2024-04-13 | End: 2024-04-13

## 2024-04-13 RX ORDER — PANTOPRAZOLE SODIUM 40 MG/1
40 TABLET, DELAYED RELEASE ORAL
Qty: 60 TABLET | Refills: 0 | Status: SHIPPED | OUTPATIENT
Start: 2024-04-13

## 2024-04-13 RX ADMIN — HYDROMORPHONE HYDROCHLORIDE 0.25 MG: 1 INJECTION, SOLUTION INTRAMUSCULAR; INTRAVENOUS; SUBCUTANEOUS at 03:20

## 2024-04-13 RX ADMIN — HYDROMORPHONE HYDROCHLORIDE 0.25 MG: 1 INJECTION, SOLUTION INTRAMUSCULAR; INTRAVENOUS; SUBCUTANEOUS at 06:17

## 2024-04-13 RX ADMIN — GABAPENTIN 600 MG: 300 CAPSULE ORAL at 13:38

## 2024-04-13 RX ADMIN — GABAPENTIN 600 MG: 300 CAPSULE ORAL at 08:21

## 2024-04-13 RX ADMIN — SODIUM CHLORIDE, PRESERVATIVE FREE 10 ML: 5 INJECTION INTRAVENOUS at 08:21

## 2024-04-13 RX ADMIN — HYDROMORPHONE HYDROCHLORIDE 0.25 MG: 1 INJECTION, SOLUTION INTRAMUSCULAR; INTRAVENOUS; SUBCUTANEOUS at 00:00

## 2024-04-13 RX ADMIN — HYDROMORPHONE HYDROCHLORIDE 0.25 MG: 1 INJECTION, SOLUTION INTRAMUSCULAR; INTRAVENOUS; SUBCUTANEOUS at 09:19

## 2024-04-13 RX ADMIN — FLUOXETINE HYDROCHLORIDE 20 MG: 20 CAPSULE ORAL at 08:21

## 2024-04-13 RX ADMIN — PANTOPRAZOLE SODIUM 40 MG: 40 INJECTION, POWDER, FOR SOLUTION INTRAVENOUS at 08:21

## 2024-04-13 RX ADMIN — OXYCODONE HYDROCHLORIDE 10 MG: 10 TABLET ORAL at 13:38

## 2024-04-13 RX ADMIN — OXYBUTYNIN CHLORIDE 5 MG: 5 TABLET, EXTENDED RELEASE ORAL at 08:21

## 2024-04-13 ASSESSMENT — PAIN DESCRIPTION - ORIENTATION
ORIENTATION: MID

## 2024-04-13 ASSESSMENT — PAIN DESCRIPTION - LOCATION
LOCATION: ABDOMEN

## 2024-04-13 ASSESSMENT — PAIN SCALES - GENERAL
PAINLEVEL_OUTOF10: 7

## 2024-04-13 ASSESSMENT — PAIN DESCRIPTION - DESCRIPTORS
DESCRIPTORS: SHARP
DESCRIPTORS: CRAMPING;SHARP
DESCRIPTORS: CRAMPING
DESCRIPTORS: SHARP;STABBING

## 2024-04-13 ASSESSMENT — PAIN - FUNCTIONAL ASSESSMENT
PAIN_FUNCTIONAL_ASSESSMENT: PREVENTS OR INTERFERES SOME ACTIVE ACTIVITIES AND ADLS
PAIN_FUNCTIONAL_ASSESSMENT: PREVENTS OR INTERFERES SOME ACTIVE ACTIVITIES AND ADLS
PAIN_FUNCTIONAL_ASSESSMENT: ACTIVITIES ARE NOT PREVENTED

## 2024-04-13 NOTE — PROGRESS NOTES
Patient discharged to home. Peripheral IV and heart monitor removed. Reviewed discharge instructions and medications with patient. Patient verbalized understanding. Patient declined wheelchair and ambulated self off floor.

## 2024-04-13 NOTE — PROGRESS NOTES
INPATIENT CONSULTATION:    IDENTIFYING DATA/REASON FOR CONSULTATION   PATIENT:  Davis Payton  MRN:  [id number]  ADMIT DATE: 4/11/2024  TIME OF EVALUATION: 4/13/2024 8:42 AM  HOSPITAL STAY:   LOS: 2 days   CONSULTING PHYSICIAN:  REASON FOR CONSULTATION:    Subjective:    -Patient reports stable pain. No acute events overnight.     MEDICATIONS   SCHEDULED:  atorvastatin, 40 mg, QHS  oxyBUTYnin, 5 mg, Daily  FLUoxetine, 20 mg, Daily  [Held by provider] clopidogrel, 75 mg, Daily  sodium chloride flush, 10 mL, 2 times per day  melatonin, 3 mg, Nightly  nicotine, 1 patch, Daily  pantoprazole, 40 mg, BID  gabapentin, 600 mg, TID      FLUIDS/DRIPS:     sodium chloride       PRNs: traZODone, 200 mg, Nightly PRN  sodium chloride flush, 10 mL, PRN  sodium chloride, , PRN  potassium chloride, 40 mEq, PRN   Or  potassium alternative oral replacement, 40 mEq, PRN   Or  potassium chloride, 10 mEq, PRN  magnesium sulfate, 2,000 mg, PRN  promethazine, 12.5 mg, Q6H PRN   Or  ondansetron, 4 mg, Q6H PRN  acetaminophen, 650 mg, Q6H PRN   Or  acetaminophen, 650 mg, Q6H PRN  HYDROmorphone, 0.25 mg, Q3H PRN      ALLERGIES:  He [unfilled]      PHYSICAL EXAM   [unfilled]   I/O last 3 completed shifts:  In: -   Out: 175 [Urine:175]  Oxygen Therapy:  @[id number])@  @[id number])@  @[id number]([id number])@    Physical Exam:  Gen: Resting in bed, NAD   CV: RRR no MRG   Pul: CTAB   Abd: Good bowel sounds throughout, no scars, soft, mild epigastric discomfort, no masses, no HSM   Ext: No edema   Neuro: No asterixis   Skin: No jaundice, spider angiomas, rajput erythema     LABS AND IMAGING     Recent Results (from the past 24 hour(s))   Respiratory Panel, Molecular, with COVID-19 (Restricted: peds pts or suitable admitted adults)    Collection Time: 04/12/24 11:10 AM    Specimen: Nasopharyngeal Swab   Result Value Ref Range    Respiratory Panel PCR       Respiratory Pathogens Panel PCR Result: Not Detected  See  additional report for complete Respiratory Pathogens Panel     Respiratory Panel Film Array Report    Collection Time: 04/12/24 11:10 AM   Result Value Ref Range    Report SEE IMAGE    EKG 12 Lead    Collection Time: 04/12/24  4:50 PM   Result Value Ref Range    Ventricular Rate 42 BPM    Atrial Rate 42 BPM    P-R Interval 148 ms    QRS Duration 84 ms    Q-T Interval 470 ms    QTc Calculation (Bazett) 392 ms    P Axis 69 degrees    R Axis 56 degrees    T Axis 54 degrees    Diagnosis       Marked sinus bradycardiaLow voltage QRSAbnormal ECGWhen compared with ECG of 22-OCT-2023 16:51,Premature ventricular complexes are no longer PresentVent. rate has decreased BY  31 BPM   Comprehensive Metabolic Panel w/ Reflex to MG    Collection Time: 04/13/24  5:07 AM   Result Value Ref Range    Sodium 140 136 - 145 mmol/L    Potassium reflex Magnesium 4.1 3.5 - 5.1 mmol/L    Chloride 107 99 - 110 mmol/L    CO2 23 21 - 32 mmol/L    Anion Gap 10 3 - 16    Glucose 65 (L) 70 - 99 mg/dL    BUN 11 7 - 20 mg/dL    Creatinine 0.9 0.9 - 1.3 mg/dL    Est, Glom Filt Rate >90 >60    Calcium 8.6 8.3 - 10.6 mg/dL    Total Protein 5.3 (L) 6.4 - 8.2 g/dL    Albumin 3.4 3.4 - 5.0 g/dL    Albumin/Globulin Ratio 1.8 1.1 - 2.2    Total Bilirubin 0.4 0.0 - 1.0 mg/dL    Alkaline Phosphatase 81 40 - 129 U/L    ALT 7 (L) 10 - 40 U/L    AST 11 (L) 15 - 37 U/L   POCT Glucose    Collection Time: 04/13/24  8:36 AM   Result Value Ref Range    POC Glucose 79 70 - 99 mg/dl    Performed on ACCU-CHEK      Other Labs    Imaging    ASSESSMENT AND RECOMMENDATIONS   Davis Payton is a53 y.o. male with a  PMH of Obesity s/p RYGB (2009), Renal Cancer s/p nephrectomy (2018), splenic abscess s/p splenectomy, recurrent SBO, HLD, GERD, OSCAR, TIA, PFO closure on plavix who presented on 4/11/2024 with abdominal pain.     IMPRESSION:  Mid abdominal pain.  History of Darrian-en-Y gastric bypass with anastomotic ulcers.  He is on PPI twice daily.  He is scheduled for repeat EGD

## 2024-04-13 NOTE — PROGRESS NOTES
Patient refused midnight vitals stating that they needed the rest. Will continue to monitor.    Electronically signed by Chris Ruiz RN on 4/12/2024 at 11:47 PM

## 2024-04-13 NOTE — DISCHARGE SUMMARY
Decision Support Exception - unselect if not a suspected or confirmed emergency medical condition->Emergency Medical Condition (MA) Reason for Exam: historu G-bypass, abdominal pain FINDINGS: Lower Chest: An intracardiac device is incompletely imaged in the right atrium.  The heart size is upper normal.  There is trace pericardial fluid. There is elevation of the left hemidiaphragm.  An acute process is not identified at the lung bases.  There is linear atelectasis or scarring at the left lung base. Organs: A focal hepatic abnormality is not identified.  Gallstones are noted. The spleen has been removed.  There is partial fatty replacement of the pancreas.  The right adrenal gland is unremarkable.  The left adrenal gland is unremarkable.  The left kidney is absent.  The right kidney is not obstructed.  Right renal cysts are again noted. GI/Bowel: The patient is status post gastric bypass surgery.  The bowel is not obstructed.  The patient is status post gastric bypass surgery.  The bowel is not obstructed.  The appendix is within normal limits. Pelvis: There is a small amount of fluid in the pelvis.  The urinary bladder is unremarkable. Peritoneum/Retroperitoneum: There is calcification of the abdominal aorta. There is no free intraperitoneal air. Bones/Soft Tissues: There is a linear fluid collection within the subcutaneous tissues of the right abdomen extending to the midline.  This appears similar to the previous exam.  The largest region of this collection measures 3.6 x 1.3 cm, slightly increased since the previous exam.  The linear abnormality extends into the left abdomen without significant fluid on the left.  There is increased attenuation in the midline of the abdomen at this location, as before. An acute osseous abnormality is not identified.  There has been prior vertebroplasty at L1.     A peripherally enhancing fluid collection within the subcutaneous tissues of the right abdominal wall is  GI/Bowel: Status post Darrian-en-Y gastric bypass.  No complication.  The small and large bowel are normal in caliber.  No evidence of wall thickening or obstruction. Pelvis: Normal bladder.  Prostate is unremarkable. Peritoneum/Retroperitoneum: No free fluid or free air.  No pathologic lymphadenopathy.  Aorta and its branches are patent.  Mild atherosclerosis. Bones/Soft Tissues: No acute or aggressive osseous lesion.  Chronic compression deformity with vertebroplasty is identified at L1. Small loculated fluid collection is identified in the subcutaneous fat of the right anterior abdomen measuring 2.6 x 1.2 cm, previously 4.2 x 2.2 cm in February 2024.     1. No acute intra-abdominal or pelvic abnormality. 2. Status post Darrian-en-Y gastric bypass, nephrectomy, and splenectomy. 3. Cholelithiasis. 4. Small loculated fluid collection in the subcutaneous fat of the right anterior abdomen has decreased in size.  Finding likely represents a decreasing seroma.       Other Significant Diagnostic Studies: As described above    Treatments: As described above    Disposition: home    Discharge Medications:       Medication List        START taking these medications      sucralfate 1 GM tablet  Commonly known as: Carafate  Take 1 tablet by mouth 4 times daily            CHANGE how you take these medications      pantoprazole 40 MG tablet  Commonly known as: PROTONIX  Take 1 tablet by mouth 2 times daily (before meals)  What changed: when to take this            CONTINUE taking these medications      atorvastatin 40 MG tablet  Commonly known as: LIPITOR  TAKE 1 TABLET BY MOUTH EVERYDAY AT BEDTIME     clopidogrel 75 MG tablet  Commonly known as: PLAVIX  TAKE 1 TABLET BY MOUTH EVERY DAY     dicyclomine 10 MG capsule  Commonly known as: BENTYL  Take 1 capsule by mouth 4 times daily (before meals and nightly)     FLUoxetine 20 MG capsule  Commonly known as: PROZAC  TAKE 1 CAPSULE BY MOUTH EVERY DAY     gabapentin 600 MG tablet  Commonly

## 2024-04-13 NOTE — PLAN OF CARE
Problem: Discharge Planning  Goal: Discharge to home or other facility with appropriate resources  4/12/2024 2235 by Chris Ruiz RN  Outcome: Progressing     Problem: Pain  Goal: Verbalizes/displays adequate comfort level or baseline comfort level  4/12/2024 2235 by Chris Ruiz RN  Outcome: Progressing     Problem: Safety - Adult  Goal: Free from fall injury  4/12/2024 2235 by Chris Ruiz RN  Outcome: Progressing     Problem: Skin/Tissue Integrity  Goal: Absence of new skin breakdown  Description: 1.  Monitor for areas of redness and/or skin breakdown  2.  Assess vascular access sites hourly  3.  Every 4-6 hours minimum:  Change oxygen saturation probe site  4.  Every 4-6 hours:  If on nasal continuous positive airway pressure, respiratory therapy assess nares and determine need for appliance change or resting period.  4/12/2024 2235 by Chris Ruiz RN  Outcome: Progressing

## 2024-04-13 NOTE — PROGRESS NOTES
Patient requested pain medication. This RN asked to get vitals before giving pain medications. Patient agreed.    Electronically signed by Chris Ruiz RN on 4/13/2024 at 12:07 AM

## 2024-04-13 NOTE — PLAN OF CARE
Problem: Discharge Planning  Goal: Discharge to home or other facility with appropriate resources  4/13/2024 1202 by Caroline Coronel RN  Outcome: Adequate for Discharge  Flowsheets (Taken 4/13/2024 0809)  Discharge to home or other facility with appropriate resources: Identify barriers to discharge with patient and caregiver       Problem: Pain  Goal: Verbalizes/displays adequate comfort level or baseline comfort level  4/13/2024 1202 by Caroline Coronel RN  Outcome: Adequate for Discharge       Problem: Safety - Adult  Goal: Free from fall injury  4/13/2024 1202 by Caroline Coronel, RN  Outcome: Adequate for Discharge       Problem: Skin/Tissue Integrity  Goal: Absence of new skin breakdown  Description: 1.  Monitor for areas of redness and/or skin breakdown  2.  Assess vascular access sites hourly  3.  Every 4-6 hours minimum:  Change oxygen saturation probe site  4.  Every 4-6 hours:  If on nasal continuous positive airway pressure, respiratory therapy assess nares and determine need for appliance change or resting period.  4/13/2024 1202 by Caroline Coronel, RN  Outcome: Adequate for Discharge

## 2024-04-13 NOTE — PROGRESS NOTES
Patient does not want to \"wait around for cardiology\". Per patient, his heart rate has always been low and that he did have a loop recorder at one point but it has since been removed. Patient stated that he did see a cardiology at a different hospital and that he is feeling fine. Hospitalist made aware and ok for patient to be discharged.

## 2024-04-13 NOTE — CONSULTS
Patient not seen. Consult discontinued per conversation with Dr. Gerard. Patient already with discharge order. Call with questions.   
normal respiratory rate and rhythm  CARDIOVASCULAR: Heart sounds are normal.  Regular rate and rhythm   ABDOMEN: Normal shape.  Tenderness: lower mid abdomen.   RECTAL: deferred, not clinically indicated  NEUROLOGIC: There are no focalizing motor or sensory deficits. CN II-XII are grossly intact..   EXTREMITIES: no cyanosis, no clubbing, and no edema.  LABS:     Recent Labs     04/11/24 2115 04/12/24 0419   WBC 8.2  --    HGB 12.3*  --    HCT 36.7*  --      --     141   K 3.8 4.0    106   CO2 26 25   BUN 9 10   CREATININE 0.8* 0.8*   CALCIUM 8.6 8.4   INR 1.16*  --    AST 14* 11*   ALT 8* 7*   BILITOT 0.5 0.5   NITRU Negative  --    COLORU Yellow  --    BACTERIA None Seen  --      Recent Labs     04/11/24 2115 04/12/24 0419   ALKPHOS 91 87   ALT 8* 7*   AST 14* 11*   BILITOT 0.5 0.5   LABALBU 3.6 3.5   LIPASE 18.0  17.0  --      CBC:   Lab Results   Component Value Date/Time    WBC 8.2 04/11/2024 09:15 PM    RBC 4.30 04/11/2024 09:15 PM    RBC 4.94 01/07/2015 10:47 AM    HGB 12.3 04/11/2024 09:15 PM    HCT 36.7 04/11/2024 09:15 PM    MCV 85.3 04/11/2024 09:15 PM    MCH 28.6 04/11/2024 09:15 PM    MCHC 33.5 04/11/2024 09:15 PM    RDW 15.7 04/11/2024 09:15 PM     04/11/2024 09:15 PM    MPV 7.9 04/11/2024 09:15 PM     CMP:    Lab Results   Component Value Date/Time     04/12/2024 04:19 AM    K 4.0 04/12/2024 04:19 AM     04/12/2024 04:19 AM    CO2 25 04/12/2024 04:19 AM    BUN 10 04/12/2024 04:19 AM    CREATININE 0.8 04/12/2024 04:19 AM    GFRAA >60 09/26/2022 07:27 PM    AGRATIO 1.8 04/12/2024 04:19 AM    LABGLOM >90 04/12/2024 04:19 AM    GLUCOSE 74 04/12/2024 04:19 AM    GLUCOSE 84 01/07/2015 10:47 AM    PROT 5.5 04/12/2024 04:19 AM    PROT 6.7 01/07/2015 10:47 AM    LABALBU 3.5 04/12/2024 04:19 AM    CALCIUM 8.4 04/12/2024 04:19 AM    BILITOT 0.5 04/12/2024 04:19 AM    ALKPHOS 87 04/12/2024 04:19 AM    AST 11 04/12/2024 04:19 AM    ALT 7 04/12/2024 04:19 AM     Urine 
age  Eyes: Anicteric  Head: Normocephalic, without obvious abnormality  Lungs: clear to auscultation bilaterally, Normal Effort  Heart: regular rate and rhythm, normal S1 and S2, no murmurs or rubs  Abdomen: soft, non-distended, non-tender. Bowel sounds normal. No masses,  no organomegaly.   Extremities: atraumatic, no cyanosis or edema  Skin: warm and dry, no jaundice  Neuro: Grossly intact, A&OX3      LABS AND IMAGING   Laboratory   Recent Labs     04/11/24 2115   WBC 8.2   HGB 12.3*   HCT 36.7*   MCV 85.3        Recent Labs     04/11/24 2115 04/12/24 0419    141   K 3.8 4.0    106   CO2 26 25   BUN 9 10   CREATININE 0.8* 0.8*     Recent Labs     04/11/24 2115 04/12/24 0419   AST 14* 11*   ALT 8* 7*   BILITOT 0.5 0.5   ALKPHOS 91 87     Recent Labs     04/11/24 2115   LIPASE 18.0  17.0     Recent Labs     04/11/24 2115   PROTIME 15.0*   INR 1.16*       Imaging  XR CHEST PORTABLE   Final Result   Left basilar airspace opacity, suspicious for pneumonia.         CT ABDOMEN PELVIS W IV CONTRAST Additional Contrast? None   Preliminary Result   A peripherally enhancing fluid collection within the subcutaneous tissues of   the right abdominal wall is re-identified, but minimally increased since the   previous exam and most consistent with a seroma or abscess.      The appendix is within normal limits.      Cholelithiasis.      Postsurgical changes, as above.      Small amount of free fluid in the pelvis.               ASSESSMENT AND RECOMMENDATIONS   53 y.o. male with a  PMH of Obesity s/p RYGB (2009), Renal Cancer s/p nephrectomy (2018), splenic abscess s/p splenectomy, recurrent SBO, HLD, GERD, OSCAR, TIA, PFO closure on plavix who presented on 4/11/2024 with abdominal pain.    IMPRESSION:  Mid abdominal pain.  History of Darrian-en-Y gastric bypass with anastomotic ulcers.  He is on PPI twice daily.  He is scheduled for repeat EGD next week.  CT showed subcutaneous fluid collection in the

## 2024-04-15 ENCOUNTER — CARE COORDINATION (OUTPATIENT)
Dept: CASE MANAGEMENT | Age: 54
End: 2024-04-15

## 2024-04-15 DIAGNOSIS — K92.2 ACUTE GI BLEEDING: Primary | ICD-10-CM

## 2024-04-15 NOTE — CARE COORDINATION
Care Transitions Note    Initial Call - Call within 2 business days of discharge: Yes     Patient Current Location:  Home: [address] [place] [address] [place] [address]    Care Transition Nurse contacted the patient by telephone to perform post hospital discharge assessment, verified name and  as identifiers. Provided introduction to self, and explanation of the Care Transition Nurse role.     Patient: Davis Payton    Patient : 1970   MRN: [id number]    Reason for Admission: Abd pain - EGD   Discharge Date: 24  RURS: Readmission Risk Score: 13.5      Last Discharge Facility       Date Complaint Diagnosis Description Type Department Provider    24 Abdominal Pain; Generalized Weakness; Fatigue Generalized abdominal pain ... ED to Hosp-Admission (Discharged) (ADMITTED) Armani Gomes MD; Judson Thorpe...            Was this an external facility discharge? No    Additional needs identified to be addressed with provider   No needs identified             Method of communication with provider: none.    Patients top risk factors for readmission: medical condition-GI bleeding, pain management    Interventions to address risk factors:   MED - discussed how to properly take Protonix. Pt to have EGD     Care Summary Note: Spoke with pt who states he is feeling ok. States he is having 8/10 pain at this time and managing with medical marijuana. States he did have a large bowel movement with dark tarry stool. States his weakness is getting better. He will have EGD tomorrow with GI. Reviewed meds with pt and proper way to take protonix. Denies questions or concerns.     Care Transition Nurse reviewed discharge instructions with patient. The patient was given an opportunity to ask questions; all questions answered at this time.. The patient verbalized understanding.   Were discharge instructions available to patient? Yes.   Reviewed appropriate site of care based on symptoms and resources available to

## 2024-04-19 ENCOUNTER — CARE COORDINATION (OUTPATIENT)
Dept: CASE MANAGEMENT | Age: 54
End: 2024-04-19

## 2024-04-19 NOTE — CARE COORDINATION
symptoms and resources available to patient includinang: PCP  Specialist  Urgent care clinics  When to call 911  [employer] Messaging. The patient agrees to contact the PCP office for questions related to their healthcare.     Advance Care Planning   The patient has the following advanced directives on file:  Advance Directives       Power of  Living Will ACP-Advance Directive ACP-Power of     Not on File Filed on 12/19/19 Filed Not on File            The patient has appointed the following active healthcare agents:    Primary Decision Maker: Kari Payton - Spouse - [phone]    Secondary Decision Maker: Anahi Payton - Parent - [phone]           Patients top risk factors for readmission: medical condition-.  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    Chronic pain syndrome- abdominal and head    History of stroke: right insular cortex on June 8, 2014 (small PFO; hypergoag w/u neg,  neurology)    Gastroesophageal reflux disease with esophagitis--on daily ppi    Intractable chronic migraine without aura and with status migrainosus    Iron deficiency anemia    B12 deficiency    History of renal cell cancer- 2018, DR Napoles, clear cell, left    History of depression    History of alcoholism (HCC)    History of total knee arthroplasty, right    History of DVT of lower extremity 4/21, bilat calves    Osteopenia of multiple sites    Current smoker    Intractable nausea and vomiting    Pain syndrome, chronic    Exocrine pancreatic insufficiency    Recurrent incisional hernia    Hx of gastric bypass    Acute GI bleeding    Generalized abdominal pain

## 2024-04-26 ENCOUNTER — HOSPITAL ENCOUNTER (OUTPATIENT)
Dept: GENERAL RADIOLOGY | Age: 54
Discharge: HOME | End: 2024-04-26
Attending: INTERNAL MEDICINE
Payer: COMMERCIAL

## 2024-04-26 ENCOUNTER — CARE COORDINATION (OUTPATIENT)
Dept: CASE MANAGEMENT | Age: 54
End: 2024-04-26

## 2024-04-26 DIAGNOSIS — R10.32 ABDOMINAL PAIN, LEFT LOWER QUADRANT: ICD-10-CM

## 2024-04-26 PROCEDURE — 74250 X-RAY XM SM INT 1CNTRST STD: CPT

## 2024-04-26 NOTE — CARE COORDINATION
Care Transitions Note    Follow Up Call      Patient Current Location:  Home: [address] [place] [address] [place] [address]    Care Transition Nurse contacted the patient by telephone. Verified name and  as identifiers.    Additional needs identified to be addressed with provider   No needs identified                 Method of communication with provider: none.    Care Summary Note: spoke with pt who states he had a small bowel follow through this am. Feeling well afterwards. Pt is able to eat and drink without issues. Denies diarrhea or constipation but still having abd pain and blood to stool. As addressed before, no findings yet. Pt states he has no lightheadedness or passing out. Is feeling stable on his feel and following up with GI. Will continue to follow.     Addressed changes since last contact:  N/a       Advance Care Planning:   Does patient have an Advance Directive: reviewed during previous call, see note. .    Medication Review:  No changes since last call.     Remote Patient Monitoring:  Offered patient enrollment in the Remote Patient Monitoring (RPM) program for in-home monitoring: Patient is not eligible for RPM program because: insurance coverage.    Assessments:  No changes since last call    Follow Up Appointment:   Patient attended [facility] appointment as scheduled  pt followed up with GI.       Care Transition Nurse provided contact information.  Plan for follow-up call in 11-14 days based on severity of symptoms and risk factors.  Plan for next call: symptom management-.      RONNIE Pyle, RN   Care Transition Nurse  Mobile: [phone]

## 2024-05-07 ENCOUNTER — OFFICE VISIT (OUTPATIENT)
Dept: FAMILY MEDICINE CLINIC | Age: 54
End: 2024-05-07
Payer: COMMERCIAL

## 2024-05-07 ENCOUNTER — CARE COORDINATION (OUTPATIENT)
Dept: CASE MANAGEMENT | Age: 54
End: 2024-05-07

## 2024-05-07 VITALS
HEART RATE: 45 BPM | DIASTOLIC BLOOD PRESSURE: 62 MMHG | OXYGEN SATURATION: 97 % | WEIGHT: 192.8 LBS | SYSTOLIC BLOOD PRESSURE: 108 MMHG | BODY MASS INDEX: 26.99 KG/M2 | TEMPERATURE: 98.3 F | RESPIRATION RATE: 20 BRPM | HEIGHT: 71 IN

## 2024-05-07 DIAGNOSIS — M25.561 ACUTE PAIN OF RIGHT KNEE: Primary | ICD-10-CM

## 2024-05-07 PROCEDURE — 99213 OFFICE O/P EST LOW 20 MIN: CPT | Performed by: NURSE PRACTITIONER

## 2024-05-07 ASSESSMENT — ENCOUNTER SYMPTOMS
COUGH: 0
SHORTNESS OF BREATH: 0

## 2024-05-07 NOTE — PROGRESS NOTES
5/7/2024    This is a 54 y.o. male   Chief Complaint   Patient presents with    Leg Pain     X3 weeks.  Rt thigh pain.  Rt hip pain.  Has had rt knee replacement and rt knee revision. No known injury.  Constant pain.    .    HPI  Patient reports that for the past 3 weeks has had right knee pain and right thigh pain. Denies known injury to area. Reports that there is a constant pain. Worse when going up or down stairs. Denies numbness.   Has history of right knee replacement 2012 and then revision in 2021 with post op complications with infections. Reports that he did not have chronic pain in knee after surgery. He was followed by ortho Dr. Scruggs but would like to have a new referral.   Current Resting pain is 4-5/10. Standing pain is 8-9/10.   Takes gabapentin 600 mg QID for chronic pain. He has tried tylenol without improvement in symptoms. Unable to tolerate NSAIDs due to GI upset and history of Vaughn's esophagus.   Has iced knee in the evening.      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    Chronic pain syndrome- abdominal and head    History of stroke: right insular cortex on June 8, 2014 (small PFO; hypergoag w/u neg,  neurology)    Gastroesophageal reflux disease with esophagitis--on daily ppi    Intractable chronic migraine without aura and with status migrainosus    Iron deficiency anemia    B12 deficiency    History of renal cell cancer- 2018, DR Napoles, clear cell, left    History of depression    History of alcoholism (HCC)    History of total knee arthroplasty, right    History of DVT of lower extremity 4/21, bilat calves    Osteopenia of multiple sites

## 2024-05-07 NOTE — CARE COORDINATION
[facility] Care Transitions Follow Up Call    2024    Patient: Davis Payton  Patient : 1970   MRN: [id number]  Reason for Admission: Bleeding - stool and vomit, EGD   Discharge Date: 24 RARS: Readmission Risk Score: 13.5         Spoke with: marycarmen    [facility] attempted outreach for care transition follow up call. Left HIPPA compliant message and contact information for call back.     Care Transitions Subsequent and Final Call    Subsequent and Final Calls  Are you currently active with any services?: Home Health  Care Transitions Interventions  Other Interventions:             Follow Up  No future appointments.    Cate Benson LPN

## 2024-05-08 ENCOUNTER — CARE COORDINATION (OUTPATIENT)
Dept: CASE MANAGEMENT | Age: 54
End: 2024-05-08

## 2024-05-08 NOTE — CARE COORDINATION
[facility] Transitions Follow Up Call    2024    Patient: Davis Payton  Patient : 1970   MRN: [id number]  Reason for Admission: Acute GI bleeding   Discharge Date: 24 RARS: Readmission Risk Score: 13.5         Spoke with: Davis Payton who reported that he is doing about the same. Patient stated that he is still having the GI issues which causes a poor diet/appetite.Patient stated that he is getting in plenty fluids. Patient stated that he continues to work with the GI doctor. Patient stated that he is drinking supplements as tolerated because he is lactose intolerant. Patient stated that he also continues with pain in RT knee. Patient reported that he seen NP yesterday about his knee. Patient request a different ortho doctor and thinking he may have to go outside of the [facility] network. Patient denied cp, sob, cough, dizziness, headache, n/v, diarrhea, abdominal pains, fever, or chills. Patient report that appetite and fluid intake is good and denied any problems with bowel or bladder. Patient reported that he is taking all medications as ordered. Patient denied any other needs at this time. Patient instructed to continue to monitor s/s, reporting any that may present to MD immediately for early intervention.  Patient  very appreciative of our outreach but declines any further outreach. Program closed    Care Transitions Subsequent and Final Call    Subsequent and Final Calls  Do you have any ongoing symptoms?: No  Have your medications changed?: No  Do you have any questions related to your medications?: No  Do you currently have any active services?: No  Are you currently active with any services?: Home Health  Do you have any needs or concerns that I can assist you with?: No  Identified Barriers: Other  Care Transitions Interventions  No Identified Needs  Other Interventions:             Follow Up  No future appointments.    Cate Benson LPN

## 2024-05-10 ENCOUNTER — OFFICE VISIT (OUTPATIENT)
Dept: ORTHOPEDIC SURGERY | Age: 54
End: 2024-05-10
Payer: COMMERCIAL

## 2024-05-10 VITALS — WEIGHT: 192 LBS | HEIGHT: 71 IN | BODY MASS INDEX: 26.88 KG/M2 | RESPIRATION RATE: 15 BRPM

## 2024-05-10 DIAGNOSIS — Z96.651 HISTORY OF TOTAL KNEE ARTHROPLASTY, RIGHT: Primary | ICD-10-CM

## 2024-05-10 DIAGNOSIS — S76.111A QUADRICEPS STRAIN, RIGHT, INITIAL ENCOUNTER: ICD-10-CM

## 2024-05-10 PROCEDURE — 99214 OFFICE O/P EST MOD 30 MIN: CPT | Performed by: ORTHOPAEDIC SURGERY

## 2024-05-10 RX ORDER — METHYLPREDNISOLONE 4 MG/1
TABLET ORAL
Qty: 1 KIT | Refills: 0 | Status: SHIPPED | OUTPATIENT
Start: 2024-05-10 | End: 2024-05-16

## 2024-05-10 SDOH — HEALTH STABILITY: PHYSICAL HEALTH: ON AVERAGE, HOW MANY DAYS PER WEEK DO YOU ENGAGE IN MODERATE TO STRENUOUS EXERCISE (LIKE A BRISK WALK)?: 7 DAYS

## 2024-05-10 SDOH — HEALTH STABILITY: PHYSICAL HEALTH: ON AVERAGE, HOW MANY MINUTES DO YOU ENGAGE IN EXERCISE AT THIS LEVEL?: 90 MIN

## 2024-05-10 NOTE — PROGRESS NOTES
Davis Payton  [id number]  May 10, 2024    Chief Complaint   Patient presents with    Knee Pain     Right knee        History: The patient is a 54-year-old gentleman who is here for evaluation of his right knee.  He has an extensive history with regards to his right knee.  He underwent a right total knee arthroplasty by Dr. Walters in 2013.  He underwent a right knee revision by Dr. Cornell in 2019.  He underwent an irrigation and debridement with polyethylene exchange by Dr. Kramer in 2020.  He had a another irrigation and debridement by Dr. Cornell in 2020 as well.  He then ultimately had a spacer placed by Dr. Cornell in August 2020.  He had a revision performed by Dr. Scruggs in 2021.  He had an irrigation and debridement with evacuation of hematoma by Dr. Scruggs in February 2021.  He had another irrigation and debridement by Dr. Scruggs in March 2021.  He states that he has been doing rather well without many issues over the past few years.  He states that he developed increasing pain 3 weeks ago.  He has a grinding sensation.  He denies any history of injury.  He denies any fever or chills.  He is under pain management and [place].    The patient's  past medical history, medications, allergies,  family history, social history, and have been reviewed, and dated and are recorded in the chart.  Pertinent items are noted in HPI.  Review of systems reviewed from Pertinent History Form dated on 5/10 and available in the patient's chart under the Media tab.     Vitals:  Resp 15   Ht 1.803 m (5' 11\")   Wt 87.1 kg (192 lb)   BMI 26.78 kg/m²     Physical: On examination today, the patient has a well-healed anterior total knee scar.  There is no evidence of erythema or warmth.  There is no evidence of knee effusion.  Range of motion of the right knee is from 0 degrees to 120 degrees.  There is no evidence of instability with varus or valgus stressing of the knee.  There is no evidence of DVT.  He has rather severe tenderness to

## 2024-05-28 RX ORDER — TRAZODONE HYDROCHLORIDE 100 MG/1
TABLET ORAL
Qty: 180 TABLET | Refills: 1 | Status: SHIPPED | OUTPATIENT
Start: 2024-05-28

## 2024-05-29 RX ORDER — PANTOPRAZOLE SODIUM 40 MG/1
40 TABLET, DELAYED RELEASE ORAL 2 TIMES DAILY
Qty: 180 TABLET | Refills: 1 | Status: SHIPPED | OUTPATIENT
Start: 2024-05-29

## 2024-05-30 ENCOUNTER — OFFICE VISIT (OUTPATIENT)
Dept: ORTHOPEDIC SURGERY | Age: 54
End: 2024-05-30
Payer: COMMERCIAL

## 2024-05-30 VITALS — HEIGHT: 71 IN | BODY MASS INDEX: 26.88 KG/M2 | WEIGHT: 192 LBS

## 2024-05-30 DIAGNOSIS — S76.111A QUADRICEPS STRAIN, RIGHT, INITIAL ENCOUNTER: Primary | ICD-10-CM

## 2024-05-30 DIAGNOSIS — Z96.651 STATUS POST REVISION OF TOTAL REPLACEMENT OF RIGHT KNEE: ICD-10-CM

## 2024-05-30 PROCEDURE — 99213 OFFICE O/P EST LOW 20 MIN: CPT | Performed by: ORTHOPAEDIC SURGERY

## 2024-05-30 RX ORDER — PREDNISONE 10 MG/1
TABLET ORAL
Qty: 42 TABLET | Refills: 0 | Status: SHIPPED | OUTPATIENT
Start: 2024-05-30

## 2024-05-30 NOTE — PROGRESS NOTES
Davis Payton  [id number]  May 30, 2024    Chief Complaint   Patient presents with    Follow-up     Right knee       History: The patient is a 54-year-old gentleman who is here for evaluation of his right knee.  He has an extensive history with regards to his right knee.  He underwent a right total knee arthroplasty by Dr. Walters in 2013.  He underwent a right knee revision by Dr. Cornell in 2019.  He underwent an irrigation and debridement with polyethylene exchange by Dr. Kramer in 2020.  He had a another irrigation and debridement by Dr. Cornell in 2020 as well.  He then ultimately had a spacer placed by Dr. Cornell in August 2020.  He had a revision performed by Dr. Scruggs in 2021.  He had an irrigation and debridement with evacuation of hematoma by Dr. Scruggs in February 2021.  He had another irrigation and debridement by Dr. Scruggs in March 2021.  The patient did take the Medrol Dosepak which helped for approximately 1 week.  He continues to have anterior distal thigh pain with activities.  On further questioning, he does report hyperflexing his right knee several weeks ago. He denies any fever or chills.  He is under pain management and [place].    The patient's  past medical history, medications, allergies,  family history, social history, and have been reviewed, and dated and are recorded in the chart.  Pertinent items are noted in HPI.  Review of systems reviewed from Pertinent History Form dated on 5/10 and available in the patient's chart under the Media tab.     Vitals:  Ht 1.803 m (5' 11\")   Wt 87.1 kg (192 lb)   BMI 26.78 kg/m²     Physical: On examination today, the patient has a well-healed anterior total knee scar.  There is no evidence of erythema or warmth.  There is no evidence of knee effusion.  Range of motion of the right knee is from 0 degrees to 120 degrees.  There is no evidence of instability with varus or valgus stressing of the knee.  There is no evidence of DVT.  He has rather severe

## 2024-06-13 ENCOUNTER — OFFICE VISIT (OUTPATIENT)
Dept: FAMILY MEDICINE CLINIC | Age: 54
End: 2024-06-13
Payer: COMMERCIAL

## 2024-06-13 VITALS
HEART RATE: 65 BPM | HEIGHT: 70 IN | DIASTOLIC BLOOD PRESSURE: 60 MMHG | RESPIRATION RATE: 18 BRPM | SYSTOLIC BLOOD PRESSURE: 98 MMHG | BODY MASS INDEX: 28.64 KG/M2 | TEMPERATURE: 98.2 F | WEIGHT: 200.02 LBS | OXYGEN SATURATION: 96 %

## 2024-06-13 DIAGNOSIS — Z98.84 HX OF GASTRIC BYPASS: ICD-10-CM

## 2024-06-13 DIAGNOSIS — E78.00 PURE HYPERCHOLESTEROLEMIA: ICD-10-CM

## 2024-06-13 DIAGNOSIS — G89.4 PAIN SYNDROME, CHRONIC: ICD-10-CM

## 2024-06-13 DIAGNOSIS — E53.8 B12 DEFICIENCY: ICD-10-CM

## 2024-06-13 DIAGNOSIS — K22.719 BARRETT'S ESOPHAGUS WITH DYSPLASIA: ICD-10-CM

## 2024-06-13 DIAGNOSIS — Z90.81 HISTORY OF SPLENECTOMY: ICD-10-CM

## 2024-06-13 DIAGNOSIS — F17.200 CURRENT SMOKER: ICD-10-CM

## 2024-06-13 DIAGNOSIS — Z00.00 WELL ADULT EXAM: Primary | ICD-10-CM

## 2024-06-13 DIAGNOSIS — G47.33 OBSTRUCTIVE SLEEP APNEA: Chronic | ICD-10-CM

## 2024-06-13 DIAGNOSIS — F33.0 MILD EPISODE OF RECURRENT MAJOR DEPRESSIVE DISORDER (HCC): ICD-10-CM

## 2024-06-13 DIAGNOSIS — Z86.73 HISTORY OF STROKE: ICD-10-CM

## 2024-06-13 DIAGNOSIS — F10.21 HISTORY OF ALCOHOLISM (HCC): ICD-10-CM

## 2024-06-13 PROBLEM — I82.463 ACUTE DEEP VEIN THROMBOSIS (DVT) OF CALF MUSCLE VEIN OF BOTH LOWER EXTREMITIES (HCC): Status: ACTIVE | Noted: 2024-06-13

## 2024-06-13 PROBLEM — F33.1 MODERATE EPISODE OF RECURRENT MAJOR DEPRESSIVE DISORDER (HCC): Status: ACTIVE | Noted: 2024-06-13

## 2024-06-13 PROBLEM — S30.1XXA ABDOMINAL WALL SEROMA: Status: RESOLVED | Noted: 2024-04-12 | Resolved: 2024-06-13

## 2024-06-13 PROBLEM — R10.84 GENERALIZED ABDOMINAL PAIN: Status: RESOLVED | Noted: 2024-04-12 | Resolved: 2024-06-13

## 2024-06-13 PROBLEM — K92.2 ACUTE GI BLEEDING: Status: RESOLVED | Noted: 2024-04-11 | Resolved: 2024-06-13

## 2024-06-13 PROBLEM — K43.2 RECURRENT INCISIONAL HERNIA: Status: RESOLVED | Noted: 2022-11-16 | Resolved: 2024-06-13

## 2024-06-13 PROBLEM — K92.1 HEMATOCHEZIA: Status: RESOLVED | Noted: 2024-04-12 | Resolved: 2024-06-13

## 2024-06-13 PROBLEM — K92.0 HEMATEMESIS WITH NAUSEA: Status: RESOLVED | Noted: 2024-04-12 | Resolved: 2024-06-13

## 2024-06-13 LAB
25(OH)D3 SERPL-MCNC: 37.8 NG/ML
ALBUMIN SERPL-MCNC: 4 G/DL (ref 3.4–5)
ALBUMIN/GLOB SERPL: 2 {RATIO} (ref 1.1–2.2)
ALP SERPL-CCNC: 77 U/L (ref 40–129)
ALT SERPL-CCNC: 13 U/L (ref 10–40)
ANION GAP SERPL CALCULATED.3IONS-SCNC: 9 MMOL/L (ref 3–16)
AST SERPL-CCNC: 12 U/L (ref 15–37)
BILIRUB SERPL-MCNC: 0.4 MG/DL (ref 0–1)
BUN SERPL-MCNC: 12 MG/DL (ref 7–20)
CALCIUM SERPL-MCNC: 9.3 MG/DL (ref 8.3–10.6)
CHLORIDE SERPL-SCNC: 105 MMOL/L (ref 99–110)
CHOLEST SERPL-MCNC: 119 MG/DL (ref 0–199)
CO2 SERPL-SCNC: 27 MMOL/L (ref 21–32)
CREAT SERPL-MCNC: 1.1 MG/DL (ref 0.9–1.3)
FERRITIN SERPL IA-MCNC: 10.4 NG/ML (ref 30–400)
GFR SERPLBLD CREATININE-BSD FMLA CKD-EPI: 80 ML/MIN/{1.73_M2}
GLUCOSE SERPL-MCNC: 88 MG/DL (ref 70–99)
HDLC SERPL-MCNC: 66 MG/DL (ref 40–60)
LDLC SERPL CALC-MCNC: 40 MG/DL
POTASSIUM SERPL-SCNC: 4.9 MMOL/L (ref 3.5–5.1)
PROT SERPL-MCNC: 6 G/DL (ref 6.4–8.2)
SODIUM SERPL-SCNC: 141 MMOL/L (ref 136–145)
TRIGL SERPL-MCNC: 66 MG/DL (ref 0–150)
VLDLC SERPL CALC-MCNC: 13 MG/DL

## 2024-06-13 PROCEDURE — 90715 TDAP VACCINE 7 YRS/> IM: CPT | Performed by: FAMILY MEDICINE

## 2024-06-13 PROCEDURE — 99396 PREV VISIT EST AGE 40-64: CPT | Performed by: FAMILY MEDICINE

## 2024-06-13 PROCEDURE — 90471 IMMUNIZATION ADMIN: CPT | Performed by: FAMILY MEDICINE

## 2024-06-13 ASSESSMENT — PATIENT HEALTH QUESTIONNAIRE - PHQ9
1. LITTLE INTEREST OR PLEASURE IN DOING THINGS: NOT AT ALL
8. MOVING OR SPEAKING SO SLOWLY THAT OTHER PEOPLE COULD HAVE NOTICED. OR THE OPPOSITE, BEING SO FIGETY OR RESTLESS THAT YOU HAVE BEEN MOVING AROUND A LOT MORE THAN USUAL: NOT AT ALL
10. IF YOU CHECKED OFF ANY PROBLEMS, HOW DIFFICULT HAVE THESE PROBLEMS MADE IT FOR YOU TO DO YOUR WORK, TAKE CARE OF THINGS AT HOME, OR GET ALONG WITH OTHER PEOPLE: NOT DIFFICULT AT ALL
SUM OF ALL RESPONSES TO PHQ QUESTIONS 1-9: 5
5. POOR APPETITE OR OVEREATING: NOT AT ALL
9. THOUGHTS THAT YOU WOULD BE BETTER OFF DEAD, OR OF HURTING YOURSELF: NOT AT ALL
6. FEELING BAD ABOUT YOURSELF - OR THAT YOU ARE A FAILURE OR HAVE LET YOURSELF OR YOUR FAMILY DOWN: NOT AT ALL
4. FEELING TIRED OR HAVING LITTLE ENERGY: SEVERAL DAYS
SUM OF ALL RESPONSES TO PHQ QUESTIONS 1-9: 5
SUM OF ALL RESPONSES TO PHQ QUESTIONS 1-9: 5
2. FEELING DOWN, DEPRESSED OR HOPELESS: NOT AT ALL
3. TROUBLE FALLING OR STAYING ASLEEP: NEARLY EVERY DAY
7. TROUBLE CONCENTRATING ON THINGS, SUCH AS READING THE NEWSPAPER OR WATCHING TELEVISION: SEVERAL DAYS
SUM OF ALL RESPONSES TO PHQ9 QUESTIONS 1 & 2: 0
SUM OF ALL RESPONSES TO PHQ QUESTIONS 1-9: 5

## 2024-06-13 ASSESSMENT — ENCOUNTER SYMPTOMS
RESPIRATORY NEGATIVE: 1
ALLERGIC/IMMUNOLOGIC NEGATIVE: 1
EYES NEGATIVE: 1
GASTROINTESTINAL NEGATIVE: 1

## 2024-06-13 NOTE — PROGRESS NOTES
2024    Blood pressure 98/60, pulse 65, temperature 98.2 °F (36.8 °C), temperature source Oral, resp. rate 18, height 1.781 m (5' 10.13\"), weight 90.7 kg (200 lb 0.3 oz), SpO2 96 %.    Davis Payton (:  1970) is a 54 y.o. male, here for evaluation of the following medical concerns:    Chief Complaint   Patient presents with    Annual Exam     Pain in quad muscle in R leg.  On second round of steroids.     Here for check up/wellness    She is working with ortho for leg muscle pain due to a quad strain on the right side. Prior TKA on that side.  Uses ice.  Has used steroids.  Has not been to PT yet. Planning MRI next.    He has chronic abd pain due to prior surgeries, adhesions.  He sees pain mgmt in [place] and had ketamine infusion, gabapentin and MJ  Denies constipation or diarrhea.  Saw dr Pinedo when he had blood- had recent colonoscopy so that was not repeated, but upper endoscpoy was normal.  He has a prior rou en Y gastric bypass >10 yrs ago.    Weight was 482 lbs prior to surgery.    He feels he does not do anything specific to keep weight off.  He does not overeat now  Still feels full when he eats and stops eating.  Tries to eat only one meal, then snacks on nuts/trail mix.  He does nt exercise regularly, but walks dogs 4x a day, up to a mile.  Likes to walk when weather is nice.  He is unemloyed, not disabled. Denied medical disability.  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    Has dentures  Has glasses- saw eye doctor a year ago.     He is fasting for labs.  Prior stroke. Continues on Plavix long term. With atova 40.  Last lipid test:  Lab Results   Component Value Date    CHOL 104 2023    TRIG 57 2023    HDL 59 2023     Lab Results   Component Value Date    ALT 7 (L) 2024    AST 11 (L) 2024     Last renal function test:   Lab Results   Component Value Date/Time     2024 05:07 AM    K 4.1 2024 05:07 AM     2024

## 2024-06-26 ENCOUNTER — TELEPHONE (OUTPATIENT)
Dept: ORTHOPEDIC SURGERY | Age: 54
End: 2024-06-26

## 2024-06-26 NOTE — TELEPHONE ENCOUNTER
General Question     Subject: PAIN CONCERNS  Patient and /or Facility Request: Davis Payton   Contact Number: [phone]     PT CLLED TO REQ PASTOR BLAKELY FROM OFFICE ABOUT PAIN INCREASING    PLEASE CLL TO ADV

## 2024-06-27 NOTE — TELEPHONE ENCOUNTER
I spoke to pt. He is scheduled for MRI at [facility] on 7/3. I scheduled him a fu appt with Dr Vera. He will continue pain management with his pain management specialist. Patient also went to [place] ED on 6/25 and was given a Rx for Robaxin muscle relaxer.

## 2024-07-09 ENCOUNTER — OFFICE VISIT (OUTPATIENT)
Dept: ORTHOPEDIC SURGERY | Age: 54
End: 2024-07-09
Payer: COMMERCIAL

## 2024-07-09 VITALS — WEIGHT: 195 LBS | BODY MASS INDEX: 27.3 KG/M2 | HEIGHT: 71 IN

## 2024-07-09 DIAGNOSIS — Z96.651 STATUS POST REVISION OF TOTAL REPLACEMENT OF RIGHT KNEE: ICD-10-CM

## 2024-07-09 DIAGNOSIS — S76.111A QUADRICEPS STRAIN, RIGHT, INITIAL ENCOUNTER: Primary | ICD-10-CM

## 2024-07-09 PROCEDURE — 99214 OFFICE O/P EST MOD 30 MIN: CPT | Performed by: ORTHOPAEDIC SURGERY

## 2024-07-09 NOTE — PROGRESS NOTES
varus or valgus stressing of the knee.  There is no evidence of DVT.  He has rather mild tenderness to palpation over the distal aspect of the quadriceps.  He is less tender specifically over the quadriceps tendon .  He has moderate pain with resisted right knee extension.  The quadriceps tendon is intact.  The patellar tendon is intact.  He has no pain with range of motion of his right hip.  He is neurovascularly intact distally.    X-rays: MRI of the right femur was extensively reviewed.  The MRI does confirm mild edema within the lateral thigh consistent with a vastus lateralis strain.  Otherwise the MRI is unremarkable.  There is no evidence of tendon tear.    Impression: #1 right knee quadricep strain/vastus lateralis strain #2 status post right total knee arthroplasty revision    Plan: At this time, there is no sign of infection or indication for surgical intervention.  The patient may continue to modify his activities.  He will continue taking the Naprosyn.  He will work on range of motion and strengthening.  The patient can follow-up with me in 6 weeks and we will reassess him then.  If he continues to have issues, we certainly could consider a physical therapy program.        No orders of the defined types were placed in this encounter.

## 2024-07-24 ENCOUNTER — HOSPITAL ENCOUNTER (INPATIENT)
Age: 54
LOS: 2 days | Discharge: HOME | End: 2024-07-26
Attending: EMERGENCY MEDICINE | Admitting: HOSPITALIST
Payer: COMMERCIAL

## 2024-07-24 ENCOUNTER — APPOINTMENT (OUTPATIENT)
Dept: CT IMAGING | Age: 54
End: 2024-07-24
Payer: COMMERCIAL

## 2024-07-24 DIAGNOSIS — L03.311 ABDOMINAL WALL CELLULITIS: Primary | ICD-10-CM

## 2024-07-24 DIAGNOSIS — L02.211 ABSCESS OF ABDOMINAL WALL: ICD-10-CM

## 2024-07-24 DIAGNOSIS — R18.8 ABDOMINAL WALL FLUID COLLECTIONS: ICD-10-CM

## 2024-07-24 PROBLEM — L03.90 CELLULITIS: Status: ACTIVE | Noted: 2024-07-24

## 2024-07-24 LAB
ALBUMIN SERPL-MCNC: 3.5 G/DL (ref 3.4–5)
ALBUMIN/GLOB SERPL: 1.4 {RATIO} (ref 1.1–2.2)
ALP SERPL-CCNC: 90 U/L (ref 40–129)
ALT SERPL-CCNC: 7 U/L (ref 10–40)
ANION GAP SERPL CALCULATED.3IONS-SCNC: 13 MMOL/L (ref 3–16)
AST SERPL-CCNC: 10 U/L (ref 15–37)
BACTERIA URNS QL MICRO: ABNORMAL /HPF
BASOPHILS # BLD: 0.1 K/UL (ref 0–0.2)
BASOPHILS NFR BLD: 0.9 %
BILIRUB SERPL-MCNC: 0.5 MG/DL (ref 0–1)
BILIRUB UR QL STRIP.AUTO: ABNORMAL
BUN SERPL-MCNC: 9 MG/DL (ref 7–20)
CALCIUM SERPL-MCNC: 8.7 MG/DL (ref 8.3–10.6)
CHLORIDE SERPL-SCNC: 103 MMOL/L (ref 99–110)
CLARITY UR: CLEAR
CO2 SERPL-SCNC: 22 MMOL/L (ref 21–32)
COLOR UR: ABNORMAL
CREAT SERPL-MCNC: 0.9 MG/DL (ref 0.9–1.3)
DEPRECATED RDW RBC AUTO: 17 % (ref 12.4–15.4)
EOSINOPHIL # BLD: 0.3 K/UL (ref 0–0.6)
EOSINOPHIL NFR BLD: 2.8 %
EPI CELLS #/AREA URNS HPF: ABNORMAL /HPF (ref 0–5)
GFR SERPLBLD CREATININE-BSD FMLA CKD-EPI: >90 ML/MIN/{1.73_M2}
GLUCOSE SERPL-MCNC: 103 MG/DL (ref 70–99)
GLUCOSE UR STRIP.AUTO-MCNC: NEGATIVE MG/DL
HCT VFR BLD AUTO: 33.7 % (ref 40.5–52.5)
HGB BLD-MCNC: 11.3 G/DL (ref 13.5–17.5)
HGB UR QL STRIP.AUTO: NEGATIVE
KETONES UR STRIP.AUTO-MCNC: ABNORMAL MG/DL
LEUKOCYTE ESTERASE UR QL STRIP.AUTO: ABNORMAL
LIPASE SERPL-CCNC: 15 U/L (ref 13–60)
LYMPHOCYTES # BLD: 2 K/UL (ref 1–5.1)
LYMPHOCYTES NFR BLD: 21.8 %
MCH RBC QN AUTO: 30 PG (ref 26–34)
MCHC RBC AUTO-ENTMCNC: 33.7 G/DL (ref 31–36)
MCV RBC AUTO: 89 FL (ref 80–100)
MONOCYTES # BLD: 0.9 K/UL (ref 0–1.3)
MONOCYTES NFR BLD: 9.7 %
NEUTROPHILS # BLD: 6 K/UL (ref 1.7–7.7)
NEUTROPHILS NFR BLD: 64.8 %
NITRITE UR QL STRIP.AUTO: POSITIVE
PH UR STRIP.AUTO: 6 [PH] (ref 5–8)
PLATELET # BLD AUTO: 325 K/UL (ref 135–450)
PMV BLD AUTO: 8.2 FL (ref 5–10.5)
POTASSIUM SERPL-SCNC: 4 MMOL/L (ref 3.5–5.1)
PROT SERPL-MCNC: 6 G/DL (ref 6.4–8.2)
PROT UR STRIP.AUTO-MCNC: ABNORMAL MG/DL
RBC # BLD AUTO: 3.79 M/UL (ref 4.2–5.9)
RBC #/AREA URNS HPF: ABNORMAL /HPF (ref 0–4)
SODIUM SERPL-SCNC: 138 MMOL/L (ref 136–145)
SP GR UR STRIP.AUTO: 1.02 (ref 1–1.03)
TROPONIN, HIGH SENSITIVITY: <6 NG/L (ref 0–22)
UA COMPLETE W REFLEX CULTURE PNL UR: ABNORMAL
UA DIPSTICK W REFLEX MICRO PNL UR: YES
URN SPEC COLLECT METH UR: ABNORMAL
UROBILINOGEN UR STRIP-ACNC: >=8 E.U./DL
WBC # BLD AUTO: 9.2 K/UL (ref 4–11)
WBC #/AREA URNS HPF: ABNORMAL /HPF (ref 0–5)

## 2024-07-24 PROCEDURE — 2500000003 HC RX 250 WO HCPCS: Performed by: EMERGENCY MEDICINE

## 2024-07-24 PROCEDURE — 6360000002 HC RX W HCPCS: Performed by: EMERGENCY MEDICINE

## 2024-07-24 PROCEDURE — 1200000000 HC SEMI PRIVATE

## 2024-07-24 PROCEDURE — 36415 COLL VENOUS BLD VENIPUNCTURE: CPT

## 2024-07-24 PROCEDURE — 81001 URINALYSIS AUTO W/SCOPE: CPT

## 2024-07-24 PROCEDURE — 74177 CT ABD & PELVIS W/CONTRAST: CPT

## 2024-07-24 PROCEDURE — 6370000000 HC RX 637 (ALT 250 FOR IP): Performed by: INTERNAL MEDICINE

## 2024-07-24 PROCEDURE — 84484 ASSAY OF TROPONIN QUANT: CPT

## 2024-07-24 PROCEDURE — 80053 COMPREHEN METABOLIC PANEL: CPT

## 2024-07-24 PROCEDURE — 85025 COMPLETE CBC W/AUTO DIFF WBC: CPT

## 2024-07-24 PROCEDURE — 2580000003 HC RX 258: Performed by: EMERGENCY MEDICINE

## 2024-07-24 PROCEDURE — 6360000004 HC RX CONTRAST MEDICATION: Performed by: EMERGENCY MEDICINE

## 2024-07-24 PROCEDURE — 96374 THER/PROPH/DIAG INJ IV PUSH: CPT

## 2024-07-24 PROCEDURE — 6370000000 HC RX 637 (ALT 250 FOR IP): Performed by: EMERGENCY MEDICINE

## 2024-07-24 PROCEDURE — 83690 ASSAY OF LIPASE: CPT

## 2024-07-24 PROCEDURE — 2580000003 HC RX 258: Performed by: INTERNAL MEDICINE

## 2024-07-24 PROCEDURE — 99285 EMERGENCY DEPT VISIT HI MDM: CPT

## 2024-07-24 RX ORDER — FLUOXETINE HYDROCHLORIDE 20 MG/1
20 CAPSULE ORAL DAILY
Status: DISCONTINUED | OUTPATIENT
Start: 2024-07-25 | End: 2024-07-26 | Stop reason: HOSPADM

## 2024-07-24 RX ORDER — KETAMINE HYDROCHLORIDE 50 MG/ML
0.2 INJECTION, SOLUTION INTRAMUSCULAR; INTRAVENOUS PRN
Status: DISCONTINUED | OUTPATIENT
Start: 2024-07-24 | End: 2024-07-25

## 2024-07-24 RX ORDER — MORPHINE SULFATE 4 MG/ML
4 INJECTION, SOLUTION INTRAMUSCULAR; INTRAVENOUS ONCE
Status: COMPLETED | OUTPATIENT
Start: 2024-07-24 | End: 2024-07-24

## 2024-07-24 RX ORDER — GABAPENTIN 300 MG/1
600 CAPSULE ORAL 4 TIMES DAILY
Status: DISCONTINUED | OUTPATIENT
Start: 2024-07-24 | End: 2024-07-26 | Stop reason: HOSPADM

## 2024-07-24 RX ORDER — DICYCLOMINE HYDROCHLORIDE 10 MG/1
10 CAPSULE ORAL
Status: DISCONTINUED | OUTPATIENT
Start: 2024-07-24 | End: 2024-07-26 | Stop reason: HOSPADM

## 2024-07-24 RX ORDER — SODIUM CHLORIDE 9 MG/ML
INJECTION, SOLUTION INTRAVENOUS PRN
Status: DISCONTINUED | OUTPATIENT
Start: 2024-07-24 | End: 2024-07-26 | Stop reason: HOSPADM

## 2024-07-24 RX ORDER — SODIUM CHLORIDE 0.9 % (FLUSH) 0.9 %
5-40 SYRINGE (ML) INJECTION EVERY 12 HOURS SCHEDULED
Status: DISCONTINUED | OUTPATIENT
Start: 2024-07-24 | End: 2024-07-26 | Stop reason: HOSPADM

## 2024-07-24 RX ORDER — VANCOMYCIN 1.75 G/350ML
1250 INJECTION, SOLUTION INTRAVENOUS EVERY 12 HOURS
Status: DISCONTINUED | OUTPATIENT
Start: 2024-07-25 | End: 2024-07-26 | Stop reason: HOSPADM

## 2024-07-24 RX ORDER — MAGNESIUM SULFATE IN WATER 40 MG/ML
2000 INJECTION, SOLUTION INTRAVENOUS PRN
Status: DISCONTINUED | OUTPATIENT
Start: 2024-07-24 | End: 2024-07-26 | Stop reason: HOSPADM

## 2024-07-24 RX ORDER — POLYETHYLENE GLYCOL 3350 17 G/17G
17 POWDER, FOR SOLUTION ORAL DAILY PRN
Status: DISCONTINUED | OUTPATIENT
Start: 2024-07-24 | End: 2024-07-26 | Stop reason: HOSPADM

## 2024-07-24 RX ORDER — ONDANSETRON 4 MG/1
4 TABLET, ORALLY DISINTEGRATING ORAL EVERY 8 HOURS PRN
Status: DISCONTINUED | OUTPATIENT
Start: 2024-07-24 | End: 2024-07-26 | Stop reason: HOSPADM

## 2024-07-24 RX ORDER — ONDANSETRON 2 MG/ML
4 INJECTION INTRAMUSCULAR; INTRAVENOUS EVERY 6 HOURS PRN
Status: DISCONTINUED | OUTPATIENT
Start: 2024-07-24 | End: 2024-07-26 | Stop reason: HOSPADM

## 2024-07-24 RX ORDER — KETAMINE HYDROCHLORIDE 50 MG/ML
0.2 INJECTION, SOLUTION INTRAMUSCULAR; INTRAVENOUS ONCE
Status: COMPLETED | OUTPATIENT
Start: 2024-07-24 | End: 2024-07-24

## 2024-07-24 RX ORDER — POTASSIUM CHLORIDE 7.45 MG/ML
10 INJECTION INTRAVENOUS PRN
Status: DISCONTINUED | OUTPATIENT
Start: 2024-07-24 | End: 2024-07-26 | Stop reason: HOSPADM

## 2024-07-24 RX ORDER — NICOTINE 21 MG/24HR
1 PATCH, TRANSDERMAL 24 HOURS TRANSDERMAL ONCE
Status: COMPLETED | OUTPATIENT
Start: 2024-07-24 | End: 2024-07-25

## 2024-07-24 RX ORDER — PANTOPRAZOLE SODIUM 40 MG/1
40 TABLET, DELAYED RELEASE ORAL
Status: DISCONTINUED | OUTPATIENT
Start: 2024-07-25 | End: 2024-07-26 | Stop reason: HOSPADM

## 2024-07-24 RX ORDER — ACETAMINOPHEN 650 MG/1
650 SUPPOSITORY RECTAL EVERY 6 HOURS PRN
Status: DISCONTINUED | OUTPATIENT
Start: 2024-07-24 | End: 2024-07-26 | Stop reason: HOSPADM

## 2024-07-24 RX ORDER — OXYBUTYNIN CHLORIDE 5 MG/1
5 TABLET ORAL DAILY
Status: DISCONTINUED | OUTPATIENT
Start: 2024-07-24 | End: 2024-07-26 | Stop reason: HOSPADM

## 2024-07-24 RX ORDER — SODIUM CHLORIDE 0.9 % (FLUSH) 0.9 %
5-40 SYRINGE (ML) INJECTION PRN
Status: DISCONTINUED | OUTPATIENT
Start: 2024-07-24 | End: 2024-07-26 | Stop reason: HOSPADM

## 2024-07-24 RX ORDER — ENOXAPARIN SODIUM 100 MG/ML
40 INJECTION SUBCUTANEOUS DAILY
Status: DISCONTINUED | OUTPATIENT
Start: 2024-07-25 | End: 2024-07-26 | Stop reason: HOSPADM

## 2024-07-24 RX ORDER — ATORVASTATIN CALCIUM 40 MG/1
40 TABLET, FILM COATED ORAL
Status: DISCONTINUED | OUTPATIENT
Start: 2024-07-24 | End: 2024-07-26 | Stop reason: HOSPADM

## 2024-07-24 RX ORDER — 0.9 % SODIUM CHLORIDE 0.9 %
1000 INTRAVENOUS SOLUTION INTRAVENOUS ONCE
Status: COMPLETED | OUTPATIENT
Start: 2024-07-24 | End: 2024-07-24

## 2024-07-24 RX ORDER — VANCOMYCIN 1.75 G/350ML
1250 INJECTION, SOLUTION INTRAVENOUS EVERY 12 HOURS
Status: DISCONTINUED | OUTPATIENT
Start: 2024-07-24 | End: 2024-07-24

## 2024-07-24 RX ORDER — ACETAMINOPHEN 325 MG/1
650 TABLET ORAL EVERY 6 HOURS PRN
Status: DISCONTINUED | OUTPATIENT
Start: 2024-07-24 | End: 2024-07-26 | Stop reason: HOSPADM

## 2024-07-24 RX ORDER — POTASSIUM CHLORIDE 20 MEQ/1
40 TABLET, EXTENDED RELEASE ORAL PRN
Status: DISCONTINUED | OUTPATIENT
Start: 2024-07-24 | End: 2024-07-26 | Stop reason: HOSPADM

## 2024-07-24 RX ORDER — SODIUM CHLORIDE 9 MG/ML
INJECTION, SOLUTION INTRAVENOUS CONTINUOUS
Status: DISCONTINUED | OUTPATIENT
Start: 2024-07-24 | End: 2024-07-26 | Stop reason: HOSPADM

## 2024-07-24 RX ADMIN — SODIUM CHLORIDE: 9 INJECTION, SOLUTION INTRAVENOUS at 22:49

## 2024-07-24 RX ADMIN — Medication 10 ML: at 22:36

## 2024-07-24 RX ADMIN — MORPHINE SULFATE 4 MG: 4 INJECTION, SOLUTION INTRAMUSCULAR; INTRAVENOUS at 16:12

## 2024-07-24 RX ADMIN — ATORVASTATIN CALCIUM 40 MG: 40 TABLET, FILM COATED ORAL at 22:35

## 2024-07-24 RX ADMIN — KETAMINE HYDROCHLORIDE 20 MG: 50 INJECTION, SOLUTION INTRAMUSCULAR; INTRAVENOUS at 19:17

## 2024-07-24 RX ADMIN — CEFEPIME 2000 MG: 2 INJECTION, POWDER, FOR SOLUTION INTRAVENOUS at 19:21

## 2024-07-24 RX ADMIN — GABAPENTIN 600 MG: 300 CAPSULE ORAL at 22:35

## 2024-07-24 RX ADMIN — VANCOMYCIN HYDROCHLORIDE 2000 MG: 1 INJECTION, POWDER, LYOPHILIZED, FOR SOLUTION INTRAVENOUS at 19:59

## 2024-07-24 RX ADMIN — IOMEPROL INJECTION 100 ML: 714 INJECTION, SOLUTION INTRAVASCULAR at 17:17

## 2024-07-24 RX ADMIN — DICYCLOMINE HYDROCHLORIDE 10 MG: 10 CAPSULE ORAL at 22:41

## 2024-07-24 RX ADMIN — SODIUM CHLORIDE 1000 ML: 9 INJECTION, SOLUTION INTRAVENOUS at 16:12

## 2024-07-24 ASSESSMENT — PAIN - FUNCTIONAL ASSESSMENT: PAIN_FUNCTIONAL_ASSESSMENT: 0-10

## 2024-07-24 ASSESSMENT — PAIN DESCRIPTION - ORIENTATION
ORIENTATION: RIGHT;LOWER

## 2024-07-24 ASSESSMENT — ENCOUNTER SYMPTOMS
BACK PAIN: 0
COLOR CHANGE: 0
FACIAL SWELLING: 0
STRIDOR: 0
BLOOD IN STOOL: 0
VOMITING: 0
PHOTOPHOBIA: 0
NAUSEA: 0
WHEEZING: 0
ABDOMINAL PAIN: 1
VOICE CHANGE: 0
TROUBLE SWALLOWING: 0
SHORTNESS OF BREATH: 0
ABDOMINAL DISTENTION: 1

## 2024-07-24 ASSESSMENT — LIFESTYLE VARIABLES
HOW MANY STANDARD DRINKS CONTAINING ALCOHOL DO YOU HAVE ON A TYPICAL DAY: PATIENT DOES NOT DRINK
HOW OFTEN DO YOU HAVE A DRINK CONTAINING ALCOHOL: NEVER

## 2024-07-24 ASSESSMENT — PAIN SCALES - GENERAL
PAINLEVEL_OUTOF10: 5
PAINLEVEL_OUTOF10: 7
PAINLEVEL_OUTOF10: 8
PAINLEVEL_OUTOF10: 8

## 2024-07-24 ASSESSMENT — PAIN DESCRIPTION - LOCATION
LOCATION: ABDOMEN

## 2024-07-24 ASSESSMENT — PAIN DESCRIPTION - DESCRIPTORS: DESCRIPTORS: ACHING

## 2024-07-24 NOTE — ED NOTES
Ketamine ordered per ED MD for patient's management of chronic pain. Patient agreeable to stay at this time. Dose Verified and administered with Kailash THORNTON at the bedside. Patient remains on cardiac monitor, with BP cycling.

## 2024-07-24 NOTE — ED NOTES
RLQ localized swelling and redness. States that he has been diagnosed with a fluid pocked to RLQ. Pt states that since last Friday he has been experiencing increased pain, swelling, and discomfort. States that swelling typically worse with prolonged exertion, which he says he has been preparing for a camping trip and not resting which aggrevates the swelling in the past.  States that the swelling usually is relieved with rest but states that he has not been able to rest due to being busy.

## 2024-07-24 NOTE — H&P
[facility] OR    REVISION TOTAL KNEE ARTHROPLASTY Right 01/22/2020    RIGHT KNEE IRRIGATION AND DEBRIDEMENT WITH POLY EXCHANGE performed by Devan Kramer MD at [facility] OR    REVISION TOTAL KNEE ARTHROPLASTY Right 02/16/2021    RIGHT REMOVAL OF EXPLANT AND TOTAL KNEE REPLACEMENT performed by Eb Scruggs MD at [facility] OR    SPLENECTOMY      TOTAL KNEE ARTHROPLASTY  10/15/2013    RIGHT    TOTAL KNEE ARTHROPLASTY Right 08/12/2020    RIGHT ARTHROPLASY  RESECTION WITH INSERTION OF SPACER performed by Jose Cornell MD at [facility] OR    UPPER GASTROINTESTINAL ENDOSCOPY  06/07/2016    UPPER GASTROINTESTINAL ENDOSCOPY  04/02/2018    UPPER GASTROINTESTINAL ENDOSCOPY N/A 03/24/2022    ESOPHAGOGASTRODUODENOSCOPY performed by Karthik Mock MD at [facility] ENDOSCOPY    UPPER GASTROINTESTINAL ENDOSCOPY  05/31/2022    ESOPHAGOGASTRODUODENOSCOPY WITH BIOPSY performed by Virginie Medrano MD at [facility] ENDOSCOPY    VENTRAL HERNIA REPAIR N/A 12/02/2022    EXCISION ABDOMINAL WALL NODULE, REPAIR FASCIAL DEFECT performed by Morro Sheikh MD at [facility] OR        Medications Prior to Admission     Prior to Admission medications    Medication Sig Start Date End Date Taking? Authorizing Provider   predniSONE (DELTASONE) 10 MG tablet 6 tablets day 1 and 2, 5 tables day 3 and 4, 4 tablets day 5 and 6, 3 tablets day 7 and 8, 2 tablets day 9 and 10, 1 tablet day 11 and 12  Patient not taking: Reported on 7/9/2024 5/30/24   Pedro Vera MD   pantoprazole (PROTONIX) 40 MG tablet TAKE 1 TABLET BY MOUTH TWICE A DAY 5/29/24   David Lopez MD   traZODone (DESYREL) 100 MG tablet TAKE 2 TABLETS BY MOUTH EVERY NIGHT 5/28/24   David Lopez MD   oxyBUTYnin (DITROPAN) 5 MG tablet Take 1 tablet by mouth daily    Provider, MD Leidy   dicyclomine (BENTYL) 10 MG capsule Take 1 capsule by mouth 4 times daily (before meals and nightly) 3/31/24   Robbie Bermudez MD   atorvastatin (LIPITOR) 40 MG tablet TAKE 1 TABLET BY MOUTH EVERYDAY AT  Albumin/Globulin Ratio 1.4 1.1 - 2.2    Total Bilirubin 0.5 0.0 - 1.0 mg/dL    Alkaline Phosphatase 90 40 - 129 U/L    ALT 7 (L) 10 - 40 U/L    AST 10 (L) 15 - 37 U/L   Urinalysis with Reflex to Culture    Collection Time: 07/24/24  4:15 PM    Specimen: Urine, clean catch   Result Value Ref Range    Color, UA DARK YELLOW (A) Straw/Yellow    Clarity, UA Clear Clear    Glucose, Ur Negative Negative mg/dL    Bilirubin, Urine MODERATE (A) Negative    Ketones, Urine TRACE (A) Negative mg/dL    Specific Gravity, UA 1.025 1.005 - 1.030    Blood, Urine Negative Negative    pH, Urine 6.0 5.0 - 8.0    Protein, UA TRACE (A) Negative mg/dL    Urobilinogen, Urine >=8.0 (A) <2.0 E.U./dL    Nitrite, Urine POSITIVE (A) Negative    Leukocyte Esterase, Urine TRACE (A) Negative    Microscopic Examination YES     Urine Type Voided     Urine Reflex to Culture Not Indicated    Troponin    Collection Time: 07/24/24  4:15 PM   Result Value Ref Range    Troponin, High Sensitivity <6 0 - 22 ng/L   Lipase    Collection Time: 07/24/24  4:15 PM   Result Value Ref Range    Lipase 15.0 13.0 - 60.0 U/L   Microscopic Urinalysis    Collection Time: 07/24/24  4:15 PM   Result Value Ref Range    WBC, UA 6-9 (A) 0 - 5 /HPF    RBC, UA 3-4 0 - 4 /HPF    Epithelial Cells, UA 2-5 0 - 5 /HPF    Bacteria, UA Rare (A) None Seen /HPF        Imaging/Diagnostics Last 24 Hours   CT ABDOMEN PELVIS W IV CONTRAST Additional Contrast? None    Result Date: 7/24/2024  EXAMINATION: CT OF THE ABDOMEN AND PELVIS WITH CONTRAST 7/24/2024 4:58 pm TECHNIQUE: CT of the abdomen and pelvis was performed with the administration of intravenous contrast. Multiplanar reformatted images are provided for review. Automated exposure control, iterative reconstruction, and/or weight based adjustment of the mA/kV was utilized to reduce the radiation dose to as low as reasonably achievable. COMPARISON: None. HISTORY: Acute right lower quadrant pain and swelling. FINDINGS: Lower Chest:

## 2024-07-24 NOTE — ED PROVIDER NOTES
[facility]  eMERGENCY dEPARTMENT eNCOUnter      Pt Name: Davis Payton  MRN: [id number]  Birthdate 1970  Date of evaluation: 7/24/2024  Provider: Art Brink MD    CHIEF COMPLAINT       Chief Complaint   Patient presents with    Swelling     Pt to ED with CC of RLQ abd swelling since last Friday.  Pt states that he has a hx of a fluid pocket in this area and that he has been very active which has caused the swelling to increase.  Pt states that typically fluid decreases with rest.  Pt states that the area feels warm to the touch         HISTORY OF PRESENT ILLNESS   (Location/Symptom, Timing/Onset, Context/Setting, Quality, Duration, Modifying Factors, Severity)  Note limiting factors.     History obtained from: the patient     Davis Payton is a 54 y.o. male who reports previous history of alcoholism but reports he has not had any alcohol in 9 years he reports that he does develop a \"fluid pocket\" in his right lower abdomen from time to time especially when he is more active.  Reports he has been more active recently and has had pain redness and swelling to his right lower quadrant of the abdomen for approximately 1 week.  Reports tactile pressure and movement worsens the pain.  Reports in the past this situation has resolved when he has rested.  Denies any fever or trauma.    HPI    Nursing Notes were reviewed.    REVIEW OFSYSTEMS    (2-9 systems for level 4, 10 or more for level 5)     Review of Systems   Constitutional:  Negative for appetite change, fever and unexpected weight change.   HENT:  Negative for facial swelling, trouble swallowing and voice change.    Eyes:  Negative for photophobia and visual disturbance.   Respiratory:  Negative for shortness of breath, wheezing and stridor.    Cardiovascular:  Negative for chest pain and palpitations.   Gastrointestinal:  Positive for abdominal distention and abdominal pain. Negative for blood in stool, nausea and  surgery following on.    The patient is in agreement with this plan to be admitted however reports that he is in a ketamine pain clinic with an appointment at 10 AM tomorrow at [facility] where he would have gotten a ketamine injection.  Patient reports that he does have chronic pain and does not want to take any more opiates than that history and prefers ketamine for pain instead.  Patient does consider leaving AGAINST MEDICAL ADVICE however is in agreement to stay with a nicotine patch and ketamine for pain.  Patient is made n.p.o. at midnight and admitted for further care.              FINAL IMPRESSION      1. Abdominal wall cellulitis    2. Abdominal wall fluid collections          DISPOSITION/PLAN     DISPOSITION Admitted 07/24/2024 06:51:24 PM        (Please note that portions of this note were completed with a voice recognition program.  Efforts were made to edit the dictations but occasionally words are mis-transcribed.)    Art Brink MD (electronically signed)  Attending Emergency Physician             Art Brink MD  07/24/24 [id number]

## 2024-07-25 ENCOUNTER — ANESTHESIA (OUTPATIENT)
Dept: OPERATING ROOM | Age: 54
End: 2024-07-25
Payer: COMMERCIAL

## 2024-07-25 ENCOUNTER — ANESTHESIA EVENT (OUTPATIENT)
Dept: OPERATING ROOM | Age: 54
End: 2024-07-25
Payer: COMMERCIAL

## 2024-07-25 LAB
ANION GAP SERPL CALCULATED.3IONS-SCNC: 10 MMOL/L (ref 3–16)
BASOPHILS # BLD: 0.1 K/UL (ref 0–0.2)
BASOPHILS NFR BLD: 1 %
BUN SERPL-MCNC: 7 MG/DL (ref 7–20)
CALCIUM SERPL-MCNC: 8.5 MG/DL (ref 8.3–10.6)
CHLORIDE SERPL-SCNC: 109 MMOL/L (ref 99–110)
CO2 SERPL-SCNC: 24 MMOL/L (ref 21–32)
CREAT SERPL-MCNC: 0.8 MG/DL (ref 0.9–1.3)
DEPRECATED RDW RBC AUTO: 16.9 % (ref 12.4–15.4)
EOSINOPHIL # BLD: 0.4 K/UL (ref 0–0.6)
EOSINOPHIL NFR BLD: 3.6 %
GFR SERPLBLD CREATININE-BSD FMLA CKD-EPI: >90 ML/MIN/{1.73_M2}
GLUCOSE SERPL-MCNC: 121 MG/DL (ref 70–99)
HCT VFR BLD AUTO: 35.2 % (ref 40.5–52.5)
HGB BLD-MCNC: 11.5 G/DL (ref 13.5–17.5)
LYMPHOCYTES # BLD: 2.2 K/UL (ref 1–5.1)
LYMPHOCYTES NFR BLD: 21.5 %
MCH RBC QN AUTO: 29.1 PG (ref 26–34)
MCHC RBC AUTO-ENTMCNC: 32.9 G/DL (ref 31–36)
MCV RBC AUTO: 88.7 FL (ref 80–100)
MONOCYTES # BLD: 1.2 K/UL (ref 0–1.3)
MONOCYTES NFR BLD: 11.7 %
NEUTROPHILS # BLD: 6.3 K/UL (ref 1.7–7.7)
NEUTROPHILS NFR BLD: 62.2 %
PLATELET # BLD AUTO: 337 K/UL (ref 135–450)
PMV BLD AUTO: 8.9 FL (ref 5–10.5)
POTASSIUM SERPL-SCNC: 4.3 MMOL/L (ref 3.5–5.1)
RBC # BLD AUTO: 3.96 M/UL (ref 4.2–5.9)
SODIUM SERPL-SCNC: 143 MMOL/L (ref 136–145)
WBC # BLD AUTO: 10.1 K/UL (ref 4–11)

## 2024-07-25 PROCEDURE — 6360000002 HC RX W HCPCS: Performed by: INTERNAL MEDICINE

## 2024-07-25 PROCEDURE — 3700000000 HC ANESTHESIA ATTENDED CARE: Performed by: SURGERY

## 2024-07-25 PROCEDURE — 87075 CULTR BACTERIA EXCEPT BLOOD: CPT

## 2024-07-25 PROCEDURE — APPSS15 APP SPLIT SHARED TIME 0-15 MINUTES: Performed by: PHYSICIAN ASSISTANT

## 2024-07-25 PROCEDURE — 1200000000 HC SEMI PRIVATE

## 2024-07-25 PROCEDURE — 10061 I&D ABSCESS COMP/MULTIPLE: CPT | Performed by: SURGERY

## 2024-07-25 PROCEDURE — 80048 BASIC METABOLIC PNL TOTAL CA: CPT

## 2024-07-25 PROCEDURE — 86403 PARTICLE AGGLUT ANTBDY SCRN: CPT

## 2024-07-25 PROCEDURE — 87205 SMEAR GRAM STAIN: CPT

## 2024-07-25 PROCEDURE — 2500000003 HC RX 250 WO HCPCS: Performed by: SURGERY

## 2024-07-25 PROCEDURE — 6370000000 HC RX 637 (ALT 250 FOR IP): Performed by: INTERNAL MEDICINE

## 2024-07-25 PROCEDURE — 3700000001 HC ADD 15 MINUTES (ANESTHESIA): Performed by: SURGERY

## 2024-07-25 PROCEDURE — 2709999900 HC NON-CHARGEABLE SUPPLY: Performed by: SURGERY

## 2024-07-25 PROCEDURE — 2580000003 HC RX 258: Performed by: INTERNAL MEDICINE

## 2024-07-25 PROCEDURE — 87070 CULTURE OTHR SPECIMN AEROBIC: CPT

## 2024-07-25 PROCEDURE — 87077 CULTURE AEROBIC IDENTIFY: CPT

## 2024-07-25 PROCEDURE — 2500000003 HC RX 250 WO HCPCS: Performed by: NURSE ANESTHETIST, CERTIFIED REGISTERED

## 2024-07-25 PROCEDURE — 3600000002 HC SURGERY LEVEL 2 BASE: Performed by: SURGERY

## 2024-07-25 PROCEDURE — 6360000002 HC RX W HCPCS: Performed by: HOSPITALIST

## 2024-07-25 PROCEDURE — 36415 COLL VENOUS BLD VENIPUNCTURE: CPT

## 2024-07-25 PROCEDURE — 6370000000 HC RX 637 (ALT 250 FOR IP)

## 2024-07-25 PROCEDURE — 2580000003 HC RX 258: Performed by: SURGERY

## 2024-07-25 PROCEDURE — 3600000012 HC SURGERY LEVEL 2 ADDTL 15MIN: Performed by: SURGERY

## 2024-07-25 PROCEDURE — 7100000000 HC PACU RECOVERY - FIRST 15 MIN: Performed by: SURGERY

## 2024-07-25 PROCEDURE — 85025 COMPLETE CBC W/AUTO DIFF WBC: CPT

## 2024-07-25 PROCEDURE — APPNB15 APP NON BILLABLE TIME 0-15 MINS: Performed by: PHYSICIAN ASSISTANT

## 2024-07-25 PROCEDURE — 6360000002 HC RX W HCPCS: Performed by: NURSE ANESTHETIST, CERTIFIED REGISTERED

## 2024-07-25 PROCEDURE — 6360000002 HC RX W HCPCS: Performed by: SURGERY

## 2024-07-25 PROCEDURE — 0J983ZZ DRAINAGE OF ABDOMEN SUBCUTANEOUS TISSUE AND FASCIA, PERCUTANEOUS APPROACH: ICD-10-PCS | Performed by: SURGERY

## 2024-07-25 PROCEDURE — 87186 SC STD MICRODIL/AGAR DIL: CPT

## 2024-07-25 PROCEDURE — 7100000001 HC PACU RECOVERY - ADDTL 15 MIN: Performed by: SURGERY

## 2024-07-25 PROCEDURE — A4217 STERILE WATER/SALINE, 500 ML: HCPCS | Performed by: SURGERY

## 2024-07-25 PROCEDURE — 6360000002 HC RX W HCPCS

## 2024-07-25 PROCEDURE — 94760 N-INVAS EAR/PLS OXIMETRY 1: CPT

## 2024-07-25 RX ORDER — FENTANYL CITRATE 50 UG/ML
INJECTION, SOLUTION INTRAMUSCULAR; INTRAVENOUS PRN
Status: DISCONTINUED | OUTPATIENT
Start: 2024-07-25 | End: 2024-07-25 | Stop reason: SDUPTHER

## 2024-07-25 RX ORDER — LIDOCAINE HYDROCHLORIDE 20 MG/ML
INJECTION, SOLUTION EPIDURAL; INFILTRATION; INTRACAUDAL; PERINEURAL PRN
Status: DISCONTINUED | OUTPATIENT
Start: 2024-07-25 | End: 2024-07-25 | Stop reason: SDUPTHER

## 2024-07-25 RX ORDER — ONDANSETRON 2 MG/ML
INJECTION INTRAMUSCULAR; INTRAVENOUS PRN
Status: DISCONTINUED | OUTPATIENT
Start: 2024-07-25 | End: 2024-07-25 | Stop reason: SDUPTHER

## 2024-07-25 RX ORDER — GLYCOPYRROLATE 0.2 MG/ML
INJECTION INTRAMUSCULAR; INTRAVENOUS PRN
Status: DISCONTINUED | OUTPATIENT
Start: 2024-07-25 | End: 2024-07-25 | Stop reason: SDUPTHER

## 2024-07-25 RX ORDER — TRAZODONE HYDROCHLORIDE 100 MG/1
200 TABLET ORAL NIGHTLY
Status: DISCONTINUED | OUTPATIENT
Start: 2024-07-25 | End: 2024-07-26 | Stop reason: HOSPADM

## 2024-07-25 RX ORDER — SODIUM CHLORIDE 0.9 % (FLUSH) 0.9 %
5-40 SYRINGE (ML) INJECTION EVERY 12 HOURS SCHEDULED
Status: DISCONTINUED | OUTPATIENT
Start: 2024-07-25 | End: 2024-07-25 | Stop reason: HOSPADM

## 2024-07-25 RX ORDER — SODIUM CHLORIDE 9 MG/ML
INJECTION, SOLUTION INTRAVENOUS PRN
Status: DISCONTINUED | OUTPATIENT
Start: 2024-07-25 | End: 2024-07-25 | Stop reason: HOSPADM

## 2024-07-25 RX ORDER — PROPOFOL 10 MG/ML
INJECTION, EMULSION INTRAVENOUS PRN
Status: DISCONTINUED | OUTPATIENT
Start: 2024-07-25 | End: 2024-07-25 | Stop reason: SDUPTHER

## 2024-07-25 RX ORDER — SODIUM CHLORIDE 0.9 % (FLUSH) 0.9 %
5-40 SYRINGE (ML) INJECTION PRN
Status: DISCONTINUED | OUTPATIENT
Start: 2024-07-25 | End: 2024-07-25 | Stop reason: HOSPADM

## 2024-07-25 RX ORDER — DEXAMETHASONE SODIUM PHOSPHATE 4 MG/ML
INJECTION, SOLUTION INTRA-ARTICULAR; INTRALESIONAL; INTRAMUSCULAR; INTRAVENOUS; SOFT TISSUE PRN
Status: DISCONTINUED | OUTPATIENT
Start: 2024-07-25 | End: 2024-07-25 | Stop reason: SDUPTHER

## 2024-07-25 RX ORDER — NALOXONE HYDROCHLORIDE 0.4 MG/ML
INJECTION, SOLUTION INTRAMUSCULAR; INTRAVENOUS; SUBCUTANEOUS PRN
Status: DISCONTINUED | OUTPATIENT
Start: 2024-07-25 | End: 2024-07-25 | Stop reason: HOSPADM

## 2024-07-25 RX ORDER — MIDAZOLAM HYDROCHLORIDE 1 MG/ML
INJECTION INTRAMUSCULAR; INTRAVENOUS PRN
Status: DISCONTINUED | OUTPATIENT
Start: 2024-07-25 | End: 2024-07-25 | Stop reason: SDUPTHER

## 2024-07-25 RX ORDER — MAGNESIUM HYDROXIDE 1200 MG/15ML
LIQUID ORAL CONTINUOUS PRN
Status: DISCONTINUED | OUTPATIENT
Start: 2024-07-25 | End: 2024-07-25 | Stop reason: HOSPADM

## 2024-07-25 RX ORDER — ONDANSETRON 2 MG/ML
4 INJECTION INTRAMUSCULAR; INTRAVENOUS
Status: DISCONTINUED | OUTPATIENT
Start: 2024-07-25 | End: 2024-07-25 | Stop reason: HOSPADM

## 2024-07-25 RX ADMIN — VANCOMYCIN 1250 MG: 1.75 INJECTION, SOLUTION INTRAVENOUS at 08:58

## 2024-07-25 RX ADMIN — FLUOXETINE HYDROCHLORIDE 20 MG: 20 CAPSULE ORAL at 09:00

## 2024-07-25 RX ADMIN — PROPOFOL 150 MG: 10 INJECTION, EMULSION INTRAVENOUS at 18:54

## 2024-07-25 RX ADMIN — Medication 10 ML: at 21:08

## 2024-07-25 RX ADMIN — GABAPENTIN 600 MG: 300 CAPSULE ORAL at 21:07

## 2024-07-25 RX ADMIN — ONDANSETRON 4 MG: 2 INJECTION INTRAMUSCULAR; INTRAVENOUS at 19:03

## 2024-07-25 RX ADMIN — DICYCLOMINE HYDROCHLORIDE 10 MG: 10 CAPSULE ORAL at 09:02

## 2024-07-25 RX ADMIN — TRAZODONE HYDROCHLORIDE 200 MG: 100 TABLET ORAL at 21:07

## 2024-07-25 RX ADMIN — DEXAMETHASONE SODIUM PHOSPHATE 8 MG: 4 INJECTION, SOLUTION INTRAMUSCULAR; INTRAVENOUS at 18:58

## 2024-07-25 RX ADMIN — DICYCLOMINE HYDROCHLORIDE 10 MG: 10 CAPSULE ORAL at 21:07

## 2024-07-25 RX ADMIN — LIDOCAINE HYDROCHLORIDE 80 MG: 20 INJECTION, SOLUTION EPIDURAL; INFILTRATION; INTRACAUDAL; PERINEURAL at 18:54

## 2024-07-25 RX ADMIN — GABAPENTIN 600 MG: 300 CAPSULE ORAL at 09:00

## 2024-07-25 RX ADMIN — CEFEPIME 2000 MG: 2 INJECTION, POWDER, FOR SOLUTION INTRAVENOUS at 06:37

## 2024-07-25 RX ADMIN — HYDROMORPHONE HYDROCHLORIDE 0.5 MG: 1 INJECTION, SOLUTION INTRAMUSCULAR; INTRAVENOUS; SUBCUTANEOUS at 06:47

## 2024-07-25 RX ADMIN — VANCOMYCIN 1250 MG: 1.75 INJECTION, SOLUTION INTRAVENOUS at 21:13

## 2024-07-25 RX ADMIN — CEFEPIME 2000 MG: 2 INJECTION, POWDER, FOR SOLUTION INTRAVENOUS at 21:13

## 2024-07-25 RX ADMIN — HYDROMORPHONE HYDROCHLORIDE 1 MG: 1 INJECTION, SOLUTION INTRAMUSCULAR; INTRAVENOUS; SUBCUTANEOUS at 14:38

## 2024-07-25 RX ADMIN — GLYCOPYRROLATE 0.2 MG: 0.2 INJECTION INTRAMUSCULAR; INTRAVENOUS at 18:51

## 2024-07-25 RX ADMIN — HYDROMORPHONE HYDROCHLORIDE 1 MG: 1 INJECTION, SOLUTION INTRAMUSCULAR; INTRAVENOUS; SUBCUTANEOUS at 11:13

## 2024-07-25 RX ADMIN — SODIUM CHLORIDE: 9 INJECTION, SOLUTION INTRAVENOUS at 13:17

## 2024-07-25 RX ADMIN — FENTANYL CITRATE 50 MCG: 50 INJECTION INTRAMUSCULAR; INTRAVENOUS at 18:54

## 2024-07-25 RX ADMIN — Medication 10 ML: at 09:00

## 2024-07-25 RX ADMIN — ATORVASTATIN CALCIUM 40 MG: 40 TABLET, FILM COATED ORAL at 21:07

## 2024-07-25 RX ADMIN — GABAPENTIN 600 MG: 300 CAPSULE ORAL at 13:11

## 2024-07-25 RX ADMIN — MIDAZOLAM 2 MG: 1 INJECTION INTRAMUSCULAR; INTRAVENOUS at 18:50

## 2024-07-25 ASSESSMENT — PAIN DESCRIPTION - LOCATION
LOCATION: ABDOMEN

## 2024-07-25 ASSESSMENT — PAIN DESCRIPTION - DESCRIPTORS
DESCRIPTORS: ACHING
DESCRIPTORS: ACHING
DESCRIPTORS: ACHING;PRESSURE
DESCRIPTORS: ACHING

## 2024-07-25 ASSESSMENT — PAIN DESCRIPTION - FREQUENCY
FREQUENCY: CONTINUOUS
FREQUENCY: CONTINUOUS

## 2024-07-25 ASSESSMENT — PAIN DESCRIPTION - ORIENTATION
ORIENTATION: RIGHT;LOWER

## 2024-07-25 ASSESSMENT — LIFESTYLE VARIABLES: SMOKING_STATUS: 1

## 2024-07-25 ASSESSMENT — PAIN SCALES - GENERAL
PAINLEVEL_OUTOF10: 7
PAINLEVEL_OUTOF10: 8
PAINLEVEL_OUTOF10: 6
PAINLEVEL_OUTOF10: 2
PAINLEVEL_OUTOF10: 5
PAINLEVEL_OUTOF10: 6
PAINLEVEL_OUTOF10: 10
PAINLEVEL_OUTOF10: 5

## 2024-07-25 ASSESSMENT — PAIN DESCRIPTION - ONSET
ONSET: ON-GOING

## 2024-07-25 ASSESSMENT — PAIN DESCRIPTION - PAIN TYPE
TYPE: ACUTE PAIN

## 2024-07-25 ASSESSMENT — PAIN - FUNCTIONAL ASSESSMENT
PAIN_FUNCTIONAL_ASSESSMENT: PREVENTS OR INTERFERES SOME ACTIVE ACTIVITIES AND ADLS
PAIN_FUNCTIONAL_ASSESSMENT: PREVENTS OR INTERFERES SOME ACTIVE ACTIVITIES AND ADLS
PAIN_FUNCTIONAL_ASSESSMENT: 0-10
PAIN_FUNCTIONAL_ASSESSMENT: FACE, LEGS, ACTIVITY, CRY, AND CONSOLABILITY (FLACC)

## 2024-07-25 ASSESSMENT — ENCOUNTER SYMPTOMS: SHORTNESS OF BREATH: 0

## 2024-07-25 NOTE — H&P
Preoperative History and Physical Update    H&P from 7/24/2024 was reviewed    No changes noted today    PE  Alert and oriented  Abdominal wall cellulitis and abscess    A/P  Abdominal wall abscess  For removal today  The risks, benefits and alternatives to the planned procedure were discussed. Patient expressed an understanding and is willing to proceed.    Electronically signed by HELEN REYES MD on 7/25/2024 at 6:55 PM

## 2024-07-25 NOTE — PROGRESS NOTES
Hospitalist Progress Note      PCP: David Lopez MD    Date of Admission: 7/24/2024    Chief Complaint: Right lower quadrant pain, fluid collection    Hospital Course: 54-year-old male with past medical history of chronic pain on ketamine, CAD, Vaughn's esophagitis, depression, GERD who presents to the ED with right lower quadrant pain with associated swelling and erythema.  While in ED, vital signs were stable, he was afebrile with no leukocytosis.  CT A/P showed previously reported subcutaneous fluid collection in the right lower quadrant had significantly enlarge and was now measuring 10 cm.  Differential includes abscess with associated cellulitis versus enlarging seroma or hematoma.  General surgery was consulted, recommended I&D in the OR.  He was placed on empiric antibiotics for possible abscess/cellulitis.  Patient was admitted for further workup/treatment.    General surgery plan to take to the OR on 7/25    Subjective: Patient seen lying in bed, states he is having some right lower quadrant pain, not currently well-controlled on current doses of medicines.  States he takes ketamine monthly as infusion from pain clinic in [place].  He denies any fevers or chills, no nausea or vomiting.  States the right lower quadrant fluid collection has been getting worse over the last few days, most recently noticed it worsening since Monday.    Assessment/Plan:  Right lower quadrant fluid collection with associated cellulitis  -CT A/P showing right lower quadrant subcutaneous fluid collection that has significantly enlarged and measures now nearly 10 cm.  -Currently treating with IV antibiotics for possible abscess and associated cellulitis  -General surgery consulted, planning OR today 7/25, further recommendations pending.  -Remains afebrile, no leukocytosis.    Chronic pain  -Sees ketamine clinic in [place].  Pain should hopefully significantly improved after drainage with general surgery.  -Oral and

## 2024-07-25 NOTE — ANESTHESIA PRE PROCEDURE
injection 40 mg  40 mg SubCUTAneous Daily Karolina Christensen MD       • ondansetron (ZOFRAN-ODT) disintegrating tablet 4 mg  4 mg Oral Q8H PRN Karolina Christensen MD        Or   • ondansetron (ZOFRAN) injection 4 mg  4 mg IntraVENous Q6H PRN Karolina Christensen MD       • polyethylene glycol (GLYCOLAX) packet 17 g  17 g Oral Daily PRN Karolina Christensen MD       • acetaminophen (TYLENOL) tablet 650 mg  650 mg Oral Q6H PRN Karolina Christensen MD        Or   • acetaminophen (TYLENOL) suppository 650 mg  650 mg Rectal Q6H PRN Karolina Christensen MD       • 0.9 % sodium chloride infusion   IntraVENous Continuous Karolina Christensen MD 75 mL/hr at 07/25/24 1317 New Bag at 07/25/24 1317   • ceFEPIme (MAXIPIME) 2,000 mg in sodium chloride 0.9 % 100 mL IVPB (mini-bag)  2,000 mg IntraVENous Q12H Karolina Christensen MD   Stopped at 07/25/24 1112   • vancomycin (VANCOCIN) intermittent dosing (placeholder)   Other RX Placeholder Karolina Christensen MD       • vancomycin (VANCOCIN) 1250 mg in 250 mL IVPB  1,250 mg IntraVENous Q12H Karolina Christensen MD   Stopped at 07/25/24 1112       Allergies:    Allergies   Allergen Reactions   • Nsaids Nausea Only and Other (See Comments)     Hx of Barretts esophagus   • Prochlorperazine Other (See Comments)     No allergic reaction, patient reported sense of \"restlessness\" and fidgiting   • Valtrex [Valacyclovir Hcl] Diarrhea   • Fentanyl Itching   • Hydrocodone    • Tolmetin Nausea Only     Hx of Barretts esophagus       Problem List:    Patient Active Problem List   Diagnosis Code   • Insomnia-chronic intermittent-- treated with edible THC G47.00   • 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 Z90.81   • Chronic

## 2024-07-25 NOTE — BRIEF OP NOTE
Brief Postoperative Note      Patient: Davis Payton  YOB: 1970  MRN: [id number]    Date of Procedure: 7/25/2024    Pre-Op Diagnosis Codes:     * Abscess of abdominal wall [L02.211]    Post-Op Diagnosis: Same       Procedure(s):  INCISION AND DRAINAGE RIGHT LOWER QUADRANT ABDOMINAL WALL ABSCESS  Subcutaneous layer only    Surgeon(s):  Kurtis Reyes MD    Assistant:  Surgical Assistant: Luis Angel Almendarez    Anesthesia: General    Estimated Blood Loss (mL): less than 50     Complications: None    Specimens:   ID Type Source Tests Collected by Time Destination   1 : 1) Abdominal Wall Abcess swab Specimen Abdomen CULTURE, WOUND, CULTURE, SURGICAL Kurtis Reyes MD 7/25/2024 1905        Implants:  * No implants in log *      Drains: * No LDAs found *    Findings:  Infection Present At Time Of Surgery (PATOS) (choose all levels that have infection present):  - Superficial Infection (skin/subcutaneous) present as evidenced by pus  Other Findings: chronic abscess in subcutaneous tissue RLQ    Electronically signed by KURTIS REYES MD on 7/25/2024 at 7:18 PM

## 2024-07-25 NOTE — PROGRESS NOTES
4 Eyes Skin Assessment     NAME:  Davis Payton  YOB: 1970  MEDICAL RECORD NUMBER:  [id number]    The patient is being assessed for  Admission    I agree that at least one RN has performed a thorough Head to Toe Skin Assessment on the patient. ALL assessment sites listed below have been assessed.      Areas assessed by both nurses:    Head, Face, Ears, Shoulders, Back, Chest, Arms, Elbows, Hands, Sacrum. Buttock, Coccyx, Ischium, Legs. Feet and Heels, and Under Medical Devices         Does the Patient have a Wound? No noted wound(s)       Andrei Prevention initiated by RN: No  Wound Care Orders initiated by RN: No    Pressure Injury (Stage 3,4, Unstageable, DTI, NWPT, and Complex wounds) if present, place Wound referral order by RN under : No    New Ostomies, if present place, Ostomy referral order under : No     Nurse 1 eSignature: Electronically signed by Giuliana Stone RN on 7/25/24 at 2:27 AM EDT    **SHARE this note so that the co-signing nurse can place an eSignature**    Nurse 2 eSignature: Electronically signed by Erika Bloom RN on 7/25/24 at 5:47 AM EDT

## 2024-07-25 NOTE — PLAN OF CARE
Problem: Discharge Planning  Goal: Discharge to home or other facility with appropriate resources  Outcome: Progressing  Flowsheets (Taken 7/24/2024 7612)  Discharge to home or other facility with appropriate resources:   Identify barriers to discharge with patient and caregiver   Arrange for needed discharge resources and transportation as appropriate   Identify discharge learning needs (meds, wound care, etc)     Problem: Pain  Goal: Verbalizes/displays adequate comfort level or baseline comfort level  Outcome: Progressing

## 2024-07-25 NOTE — ANESTHESIA POSTPROCEDURE EVALUATION
Department of Anesthesiology  Postprocedure Note    Patient: Davis Payton  MRN: [id number]  YOB: 1970  Date of evaluation: 7/25/2024    Procedure Summary       Date: 07/25/24 Room / Location: [address]    Anesthesia Start: 1850 Anesthesia Stop: 1935    Procedure: INCISION AND DRAINAGE RIGHT LOWER QUADRANT ABDOMINAL WALL ABSCESS (Right: Abdomen) Diagnosis:       Abscess of abdominal wall      (Abscess of abdominal wall [L02.211])    Surgeons: Kurtis Middleton MD Responsible Provider: Fabiana Hope MD    Anesthesia Type: general ASA Status: 3            Anesthesia Type: No value filed.    Cande Phase I: Cande Score: 8    Cande Phase II:      Anesthesia Post Evaluation    Patient location during evaluation: PACU  Patient participation: complete - patient participated  Level of consciousness: sleepy but conscious  Airway patency: patent  Nausea & Vomiting: no nausea and no vomiting  Cardiovascular status: hemodynamically stable  Respiratory status: acceptable  Hydration status: stable  Pain management: adequate        No notable events documented.

## 2024-07-25 NOTE — ED NOTES
[facility] arrived. Report given, all questions answered. [facility] assumes pt care at this time.

## 2024-07-25 NOTE — CONSULTS
History  Reviewed. Noncontributory to current situation  Social History   reports that he has been smoking cigarettes. He started smoking about 39 years ago. He has a 19.8 pack-year smoking history. He has never used smokeless tobacco. He reports that he does not currently use alcohol. He reports current drug use. Drug: Marijuana (Weed).  EDUCATION  Patient educated about their illness/diagnosis, stated above, and all questions answered. We discussed the importance of nutrition, medications they are taking, and healthy lifestyle.  Review of Systems:  General Denies any fever or chills  HEENT Denies any diplopia, tinnitus or vertigo  Resp Denies any shortness of breath, cough or wheezing  Cardiac Denies any chest pain, palpitations, claudication or edema  GI Denies any melena, hematochezia, hematemesis or pyrosis   Denies any frequency, urgency, hesitancy or incontinence  Heme Denies bruising or bleeding easily  Neuro Denies any focal motor or sensory deficits  All other systems negative  OBJECTIVE:   VITALS:  height is 1.829 m (6') and weight is 88.6 kg (195 lb 5.2 oz). His oral temperature is 98.4 °F (36.9 °C). His blood pressure is 112/66 and his pulse is 52. His respiration is 20 and oxygen saturation is 92%.   CONSTITUTIONAL: Alert and oriented times 3, no acute distress and cooperative to examination with proper mood and affect.  SKIN: Skin color, texture, turgor normal. No rashes or lesions.  LYMPH: no cervical nodes, no inguinal nodes  HEENT: Head is normocephalic, atraumatic. EOMI, PERRLA.  NECK: Supple, symmetrical, trachea midline, no adenopathy, thyroid symmetric, not enlarged and no tenderness, skin normal.  CHEST/LUNGS: chest symmetric with normal A/P diameter, normal respiratory rate and rhythm  CARDIOVASCULAR: Heart sounds are normal.  Regular rate and rhythm   ABDOMEN: +painful bulge in his RLQ. +Cellulitis in the RLQ  RECTAL: deferred, not clinically indicated  NEUROLOGIC: There are no focalizing  diagnosis   would include an enlarging seroma or hematoma.        Thank you for the interesting evaluation. Further recommendations to follow.    Armani Mackey PA-C  General and Vascular Surgery [phone]  Electronically signed by Armani Morgan PA-C on 7/25/2024 at 9:39 AM    As above  RLQ subcutaneous fluid collection, likely abscess  Plan open drainage today in OR    The risks, benefits and alternatives to the planned procedure were discussed. Patient expressed an understanding and is willing to proceed.    Electronically signed by HELEN REYES MD on 7/25/2024 at 12:21 PM

## 2024-07-25 NOTE — CONSULTS
Clinical Pharmacy Note  Vancomycin Consult    Davis Payton is a 54 y.o. male ordered Vancomycin for SSTI; consult received from Dr. Christensen to manage therapy. Also receiving cefepime.    Allergies:  Nsaids, Prochlorperazine, Valtrex [valacyclovir hcl], Fentanyl, Hydrocodone, and Tolmetin     Temp max:  Temp (24hrs), Av.2 °F (36.8 °C), Min:97.8 °F (36.6 °C), Max:98.6 °F (37 °C)      Recent Labs     24  1615   WBC 9.2       Recent Labs     24  1615   BUN 9   CREATININE 0.9       No intake or output data in the 24 hours ending 24 2210    Culture Results:      Ht Readings from Last 1 Encounters:   24 1.829 m (6')        Wt Readings from Last 1 Encounters:   24 88.6 kg (195 lb 5.2 oz)         Estimated Creatinine Clearance: 104 mL/min (based on SCr of 0.9 mg/dL).    Assessment/Plan:  Day # 1 of vancomycin.  Vancomycin 1250 mg IV every 12 hours ordered.    Goal -600  Predicted AUC 24-48 hrs: 462  Predicted AUC steady state: 507    Thank you for the consult.   Tigre Canela, PharmD

## 2024-07-25 NOTE — PROGRESS NOTES
Patient admitted with RLQ abdominal pain from [facility] ED. Patient is alert and oriented x4. Patient is NPO at midnight for a possible procedure tomorrow. Bentyl administered for abd pain. VSS and not on oxygen. Call light and bed side table within reach. Bed locked to the lowest position. No further questions at this moment. Will continue to monitor.

## 2024-07-25 NOTE — ED NOTES
Report given to NORBERTO Vega on 4N at [facility], all questions answered at this time.

## 2024-07-25 NOTE — PROGRESS NOTES
Patient off the floor  to OR for drainage of abdominal wall abscess.    Electronically signed by Lola Everett RN on 7/25/2024 at 5:25 PM

## 2024-07-25 NOTE — PLAN OF CARE
Problem: Discharge Planning  Goal: Discharge to home or other facility with appropriate resources  Outcome: Progressing  Flowsheets (Taken 7/25/2024 0840)  Discharge to home or other facility with appropriate resources:   Identify barriers to discharge with patient and caregiver   Arrange for needed discharge resources and transportation as appropriate   Identify discharge learning needs (meds, wound care, etc)     Problem: Pain  Goal: Verbalizes/displays adequate comfort level or baseline comfort level  Outcome: Progressing  Flowsheets (Taken 7/25/2024 0728)  Verbalizes/displays adequate comfort level or baseline comfort level:   Encourage patient to monitor pain and request assistance   Assess pain using appropriate pain scale   Administer analgesics based on type and severity of pain and evaluate response   Consider cultural and social influences on pain and pain management   Implement non-pharmacological measures as appropriate and evaluate response   Notify Licensed Independent Practitioner if interventions unsuccessful or patient reports new pain     Problem: Safety - Adult  Goal: Free from fall injury  Outcome: Progressing  Flowsheets (Taken 7/25/2024 0840)  Free From Fall Injury: Instruct family/caregiver on patient safety     Problem: Chronic Conditions and Co-morbidities  Goal: Patient's chronic conditions and co-morbidity symptoms are monitored and maintained or improved  Outcome: Progressing     Problem: Skin/Tissue Integrity - Adult  Goal: Incisions, wounds, or drain sites healing without S/S of infection  Outcome: Progressing     Problem: Gastrointestinal - Adult  Goal: Minimal or absence of nausea and vomiting  Outcome: Progressing

## 2024-07-25 NOTE — PROGRESS NOTES
Patient alert and oriented x 4  Vitals stable on room   Continues to endorse pain to RLQ  Medicated as per MD orders    Patient updated on care  Incision and drainage to subcutaneous fluid collection in the right lower quadrant scheduled for this afternoon    Patient updated on plan of care  Consent form signed  Pre-op check list complete    Electronically signed by Lola Everett RN on 7/25/2024 at 1:44 PM

## 2024-07-26 VITALS
OXYGEN SATURATION: 93 % | SYSTOLIC BLOOD PRESSURE: 117 MMHG | HEIGHT: 72 IN | DIASTOLIC BLOOD PRESSURE: 71 MMHG | HEART RATE: 56 BPM | TEMPERATURE: 97.4 F | WEIGHT: 188.49 LBS | BODY MASS INDEX: 25.53 KG/M2 | RESPIRATION RATE: 20 BRPM

## 2024-07-26 LAB
ANION GAP SERPL CALCULATED.3IONS-SCNC: 13 MMOL/L (ref 3–16)
BASOPHILS # BLD: 0 K/UL (ref 0–0.2)
BASOPHILS NFR BLD: 0.6 %
BUN SERPL-MCNC: 17 MG/DL (ref 7–20)
CALCIUM SERPL-MCNC: 8.4 MG/DL (ref 8.3–10.6)
CHLORIDE SERPL-SCNC: 109 MMOL/L (ref 99–110)
CO2 SERPL-SCNC: 19 MMOL/L (ref 21–32)
CREAT SERPL-MCNC: 0.9 MG/DL (ref 0.9–1.3)
DEPRECATED RDW RBC AUTO: 16.8 % (ref 12.4–15.4)
EOSINOPHIL # BLD: 0 K/UL (ref 0–0.6)
EOSINOPHIL NFR BLD: 0 %
GFR SERPLBLD CREATININE-BSD FMLA CKD-EPI: >90 ML/MIN/{1.73_M2}
GLUCOSE SERPL-MCNC: 168 MG/DL (ref 70–99)
HCT VFR BLD AUTO: 35.7 % (ref 40.5–52.5)
HGB BLD-MCNC: 11.9 G/DL (ref 13.5–17.5)
LYMPHOCYTES # BLD: 0.7 K/UL (ref 1–5.1)
LYMPHOCYTES NFR BLD: 8.2 %
MCH RBC QN AUTO: 29.3 PG (ref 26–34)
MCHC RBC AUTO-ENTMCNC: 33.3 G/DL (ref 31–36)
MCV RBC AUTO: 88.2 FL (ref 80–100)
MONOCYTES # BLD: 0.2 K/UL (ref 0–1.3)
MONOCYTES NFR BLD: 2.5 %
NEUTROPHILS # BLD: 7.2 K/UL (ref 1.7–7.7)
NEUTROPHILS NFR BLD: 88.7 %
PLATELET # BLD AUTO: 350 K/UL (ref 135–450)
PMV BLD AUTO: 8.7 FL (ref 5–10.5)
POTASSIUM SERPL-SCNC: 4.8 MMOL/L (ref 3.5–5.1)
RBC # BLD AUTO: 4.05 M/UL (ref 4.2–5.9)
SODIUM SERPL-SCNC: 141 MMOL/L (ref 136–145)
VANCOMYCIN SERPL-MCNC: 13 UG/ML
WBC # BLD AUTO: 8.1 K/UL (ref 4–11)

## 2024-07-26 PROCEDURE — APPNB15 APP NON BILLABLE TIME 0-15 MINS: Performed by: PHYSICIAN ASSISTANT

## 2024-07-26 PROCEDURE — 6360000002 HC RX W HCPCS: Performed by: SURGERY

## 2024-07-26 PROCEDURE — 2580000003 HC RX 258: Performed by: SURGERY

## 2024-07-26 PROCEDURE — 80202 ASSAY OF VANCOMYCIN: CPT

## 2024-07-26 PROCEDURE — 99024 POSTOP FOLLOW-UP VISIT: CPT | Performed by: PHYSICIAN ASSISTANT

## 2024-07-26 PROCEDURE — 6370000000 HC RX 637 (ALT 250 FOR IP): Performed by: SURGERY

## 2024-07-26 PROCEDURE — 80048 BASIC METABOLIC PNL TOTAL CA: CPT

## 2024-07-26 PROCEDURE — 36415 COLL VENOUS BLD VENIPUNCTURE: CPT

## 2024-07-26 PROCEDURE — 85025 COMPLETE CBC W/AUTO DIFF WBC: CPT

## 2024-07-26 PROCEDURE — APPSS15 APP SPLIT SHARED TIME 0-15 MINUTES: Performed by: PHYSICIAN ASSISTANT

## 2024-07-26 RX ORDER — AMOXICILLIN AND CLAVULANATE POTASSIUM 875; 125 MG/1; MG/1
1 TABLET, FILM COATED ORAL 2 TIMES DAILY
Qty: 20 TABLET | Refills: 0 | Status: SHIPPED | OUTPATIENT
Start: 2024-07-26 | End: 2024-07-29 | Stop reason: SDUPTHER

## 2024-07-26 RX ORDER — CLOPIDOGREL BISULFATE 75 MG/1
75 TABLET ORAL DAILY
Qty: 90 TABLET | Refills: 0
Start: 2024-07-27 | End: 2024-08-02 | Stop reason: SDUPTHER

## 2024-07-26 RX ORDER — OXYCODONE HYDROCHLORIDE AND ACETAMINOPHEN 5; 325 MG/1; MG/1
1 TABLET ORAL EVERY 4 HOURS PRN
Status: DISCONTINUED | OUTPATIENT
Start: 2024-07-26 | End: 2024-07-26 | Stop reason: HOSPADM

## 2024-07-26 RX ADMIN — GABAPENTIN 600 MG: 300 CAPSULE ORAL at 08:05

## 2024-07-26 RX ADMIN — OXYCODONE HYDROCHLORIDE AND ACETAMINOPHEN 1 TABLET: 5; 325 TABLET ORAL at 05:04

## 2024-07-26 RX ADMIN — PANTOPRAZOLE SODIUM 40 MG: 40 TABLET, DELAYED RELEASE ORAL at 05:02

## 2024-07-26 RX ADMIN — FLUOXETINE HYDROCHLORIDE 20 MG: 20 CAPSULE ORAL at 08:05

## 2024-07-26 RX ADMIN — GABAPENTIN 600 MG: 300 CAPSULE ORAL at 13:12

## 2024-07-26 RX ADMIN — Medication 10 ML: at 08:05

## 2024-07-26 RX ADMIN — VANCOMYCIN 1250 MG: 1.75 INJECTION, SOLUTION INTRAVENOUS at 08:03

## 2024-07-26 RX ADMIN — CEFEPIME 2000 MG: 2 INJECTION, POWDER, FOR SOLUTION INTRAVENOUS at 05:24

## 2024-07-26 RX ADMIN — ACETAMINOPHEN 325MG 650 MG: 325 TABLET ORAL at 11:15

## 2024-07-26 RX ADMIN — DICYCLOMINE HYDROCHLORIDE 10 MG: 10 CAPSULE ORAL at 05:02

## 2024-07-26 RX ADMIN — ENOXAPARIN SODIUM 40 MG: 100 INJECTION SUBCUTANEOUS at 08:04

## 2024-07-26 RX ADMIN — HYDROMORPHONE HYDROCHLORIDE 1 MG: 1 INJECTION, SOLUTION INTRAMUSCULAR; INTRAVENOUS; SUBCUTANEOUS at 02:22

## 2024-07-26 ASSESSMENT — PAIN SCALES - GENERAL
PAINLEVEL_OUTOF10: 3
PAINLEVEL_OUTOF10: 8
PAINLEVEL_OUTOF10: 7
PAINLEVEL_OUTOF10: 5
PAINLEVEL_OUTOF10: 0

## 2024-07-26 ASSESSMENT — PAIN DESCRIPTION - DESCRIPTORS
DESCRIPTORS: DISCOMFORT
DESCRIPTORS: ACHING;DISCOMFORT
DESCRIPTORS: ACHING

## 2024-07-26 ASSESSMENT — PAIN DESCRIPTION - ORIENTATION
ORIENTATION: RIGHT;LOWER
ORIENTATION: RIGHT;LOWER
ORIENTATION: RIGHT
ORIENTATION: RIGHT;LOWER
ORIENTATION: RIGHT;LOWER

## 2024-07-26 ASSESSMENT — PAIN DESCRIPTION - ONSET: ONSET: ON-GOING

## 2024-07-26 ASSESSMENT — PAIN DESCRIPTION - LOCATION
LOCATION: ABDOMEN

## 2024-07-26 ASSESSMENT — PAIN DESCRIPTION - PAIN TYPE: TYPE: ACUTE PAIN;SURGICAL PAIN

## 2024-07-26 ASSESSMENT — PAIN DESCRIPTION - FREQUENCY: FREQUENCY: INTERMITTENT

## 2024-07-26 ASSESSMENT — PAIN - FUNCTIONAL ASSESSMENT: PAIN_FUNCTIONAL_ASSESSMENT: ACTIVITIES ARE NOT PREVENTED

## 2024-07-26 NOTE — PROGRESS NOTES
Patient alert and oriented x4  VS stable on room air  Endorses pain to RLQ at the incision site.     Patient ambulated to the restroom without difficulties     AM meds complete  Patient tolerated well     Patient left in bed     Updated on care      Call light within reach  Needs met    Dressing to abdomen changed by JEMLA Baker  Site unremarkable    Electronically signed by Lola Everett RN on 7/26/2024 at 8:53 AM

## 2024-07-26 NOTE — DISCHARGE SUMMARY
Hospital Medicine Discharge Summary    Patient ID: Davis Payton      Patient's PCP: David Lopez MD    Admit Date: 7/24/2024     Discharge Date:   07/26/24    Admitting Physician: Jagruti Abreu Jr., MD     Discharging Provider: Terese Cloud PA-C       Hospital Course: 54-year-old male with past medical history of chronic pain on ketamine, CAD, Vaughn's esophagitis, depression, GERD who presents to the ED with right lower quadrant pain with associated swelling and erythema.  While in ED, vital signs were stable, he was afebrile with no leukocytosis.  CT A/P showed previously reported subcutaneous fluid collection in the right lower quadrant had significantly enlarged, measuring 10 cm.  Differential includes abscess with associated cellulitis versus enlarging seroma or hematoma. General surgery was consulted, recommended I&D in the OR.  He was placed on empiric antibiotics for possible abscess/cellulitis.  Patient was admitted for further workup/treatment. General surgery performed I&D on 7/25. Surgical cultures growing staphylococcus aureus. General surgery cleared patient for discharge on 7/26. Patient will continue oral Augmentin x10 days and follow up with Dr. Middleton in office in 2 weeks.    Right lower quadrant fluid collection with associated cellulitis  -CT A/P showing right lower quadrant subcutaneous fluid collection that has significantly enlarged and measures now nearly 10 cm.  -Currently treating with IV antibiotics for possible abscess and associated cellulitis  -General surgery consulted, s/p I&D in OR 7/25, surgical cultures +staph aureus  -Cellulitis greatly improved   -Discharge on oral Augmentin x10 days  -Continue wet-dry dressing changes as instructed  -Outpatient surgical follow up in 2 weeks      Chronic pain  -Sees ketamine clinic in [place].  Oral and IV medications ordered here. Pain significantly improved after drainage with general surgery.    CAD  -Troponin  and nightly)  Qty: 360 capsule, Refills: 0      atorvastatin (LIPITOR) 40 MG tablet TAKE 1 TABLET BY MOUTH EVERYDAY AT BEDTIME  Qty: 90 tablet, Refills: 1      FLUoxetine (PROZAC) 20 MG capsule TAKE 1 CAPSULE BY MOUTH EVERY DAY  Qty: 90 capsule, Refills: 1      sildenafil (REVATIO) 20 MG tablet Take 4 tablets by mouth as needed (30 min prior to intercourse)  Qty: 30 tablet, Refills: 5      gabapentin (NEURONTIN) 600 MG tablet Take 1 tablet by mouth 4 times daily.      Multiple Vitamin TABS Take 1 tablet by mouth daily              Time Spent on discharge is more than 35 minutes in the examination, evaluation, counseling and review of medications and discharge plan.      Signed:    Terese Cloud PA-C   7/26/2024    The patient was seen and examined on day of discharge and this discharge summary is in conjunction with any daily progress note from day of discharge.    Thank you David Lopez MD for the opportunity to be involved in this patient's care. If you have any questions or concerns please feel free to contact me.    NOTE:  This report was transcribed using voice recognition software.  Every effort was made to ensure accuracy; however, inadvertent computerized transcription errors may be present.

## 2024-07-26 NOTE — PROGRESS NOTES
Patient discharged as per order    Discharge instructions, follow-ups and  prescription reviewed with patient.   Patient informed of prescription pick-up location and importance of medication compliance  Patient voiced understanding     IV removed, catheter intact, site unremarkable      Patient left ambulating, refused wheelchair offered     Patient left with all his belongings    All questions were answered satisfactorily  Electronically signed by Lola Everett RN on 7/26/2024 at 3:51 PM

## 2024-07-26 NOTE — CARE COORDINATION
CASE MANAGEMENT DISCHARGE SUMMARY:    DISCHARGE DATE: 7/26/24    DISCHARGED TO HOME     TRANSPORTATION: family private car      Adrian Trujillo, NEGRO, [facility] Social Work Case Management   Phone: [phone]  Fax: [phone]            Electronically signed by KAMARI Roman on 7/26/2024 at 1:48 PM    
No  Plans to Return to Present Housing: (P) Yes  Other Identified Issues/Barriers to RETURNING to current housing: NA  Potential Assistance needed at discharge: (P) N/A            Potential DME:    Patient expects to discharge to: (P) House  Plan for transportation at discharge: (P) Self    Financial    Payor: BCBS / Plan: BCBS - [place] PPO / Product Type: *No Product type* /     Does insurance require precert for SNF: Yes    Potential assistance Purchasing Medications: (P) No  Meds-to-Beds request: Yes      [id number] [place], [place] - [address] - P [phone] - F [phone]  [address] [facility] [address]  Phone: [phone] Fax: [phone]    [id number] [place], [place] - [address] - P [phone] - F [phone] [address] [facility] [address]  Phone: [phone] Fax: [phone]    MAIN PHARMACY  [facility], [address] [address]        Notes:    Factors facilitating achievement of predicted outcomes: Family support, Motivated, Cooperative, and Pleasant    Barriers to discharge: NA    Additional Case Management Notes: Pt is from home with spouse, ambulatory, still drives, and currently on IVABs. Might need IVABs post dc. Expected LOS is 2 days.       The Plan for Transition of Care is related to the following treatment goals of Cellulitis [L03.90]  Abdominal wall fluid collections [R18.8]  Abdominal wall cellulitis [L03.311]    IF APPLICABLE: The Patient and/or patient representative Davis and his family were provided with a choice of provider and agrees with the discharge plan. Freedom of choice list with basic dialogue that supports the patient's individualized plan of care/goals and shares the quality data associated with the providers was provided to: (P) Patient   Patient Representative Name:       The Patient and/or Patient Representative Agree with the Discharge Plan? (P) Yes    Adrian Trujillo LMSW,

## 2024-07-26 NOTE — PLAN OF CARE
Problem: Discharge Planning  Goal: Discharge to home or other facility with appropriate resources  Recent Flowsheet Documentation  Taken 7/25/2024 2116 by Giuliana Stone RN  Discharge to home or other facility with appropriate resources:   Identify barriers to discharge with patient and caregiver   Arrange for needed discharge resources and transportation as appropriate   Identify discharge learning needs (meds, wound care, etc)  7/25/2024 1733 by Lola Everett RN  Outcome: Progressing  Flowsheets (Taken 7/25/2024 0840)  Discharge to home or other facility with appropriate resources:   Identify barriers to discharge with patient and caregiver   Arrange for needed discharge resources and transportation as appropriate   Identify discharge learning needs (meds, wound care, etc)     Problem: Pain  Goal: Verbalizes/displays adequate comfort level or baseline comfort level  7/25/2024 1733 by Lola Everett RN  Outcome: Progressing  Flowsheets (Taken 7/25/2024 0728)  Verbalizes/displays adequate comfort level or baseline comfort level:   Encourage patient to monitor pain and request assistance   Assess pain using appropriate pain scale   Administer analgesics based on type and severity of pain and evaluate response   Consider cultural and social influences on pain and pain management   Implement non-pharmacological measures as appropriate and evaluate response   Notify Licensed Independent Practitioner if interventions unsuccessful or patient reports new pain     Problem: Safety - Adult  Goal: Free from fall injury  7/25/2024 1733 by Lola Everett RN  Outcome: Progressing  Flowsheets (Taken 7/25/2024 0840)  Free From Fall Injury: Instruct family/caregiver on patient safety     Problem: Chronic Conditions and Co-morbidities  Goal: Patient's chronic conditions and co-morbidity symptoms are monitored and maintained or improved  Recent Flowsheet Documentation  Taken 7/25/2024 2116 by Chase

## 2024-07-26 NOTE — PROGRESS NOTES
4 Eyes Skin Assessment     NAME:  Davis Payton  YOB: 1970  MEDICAL RECORD NUMBER:  [id number]    The patient is being assessed for  Post-Op Surgical    I agree that at least one RN has performed a thorough Head to Toe Skin Assessment on the patient. ALL assessment sites listed below have been assessed.      Areas assessed by both nurses: some redness noticed on the buttocks    Head, Face, Ears, Shoulders, Back, Chest, Arms, Elbows, Hands, Sacrum. Buttock, Coccyx, Ischium, Legs. Feet and Heels, and Under Medical Devices         Does the Patient have a Wound? No noted wound(s)       Andrei Prevention initiated by RN: No  Wound Care Orders initiated by RN: No    Pressure Injury (Stage 3,4, Unstageable, DTI, NWPT, and Complex wounds) if present, place Wound referral order by RN under : No    New Ostomies, if present place, Ostomy referral order under : No     Nurse 1 eSignature: Electronically signed by Giuliana Stone RN on 7/26/24 at 5:46 AM EDT    **SHARE this note so that the co-signing nurse can place an eSignature**    Nurse 2 eSignature: Electronically signed by Aleida Monaco RN on 7/26/24 at 5:48 AM EDT

## 2024-07-26 NOTE — CONSULTS
Clinical Pharmacy Note  Vancomycin Consult    Davis Payton is a 54 y.o. male ordered Vancomycin for SSTI; consult received from Dr. Christensen to manage therapy. Also receiving cefepime.    Allergies:  Nsaids, Prochlorperazine, Valtrex [valacyclovir hcl], Fentanyl, Hydrocodone, and Tolmetin     Temp max:  Temp (24hrs), Av.7 °F (37.1 °C), Min:98.3 °F (36.8 °C), Max:99.2 °F (37.3 °C)      Recent Labs     24  1615 24  0433 24  0503   WBC 9.2 10.1 8.1         Recent Labs     24  1615 24  0433 24  0503   BUN 9 7 17   CREATININE 0.9 0.8* 0.9           Intake/Output Summary (Last 24 hours) at 2024 0712  Last data filed at 2024 1935  Gross per 24 hour   Intake 400 ml   Output --   Net 400 ml       Culture Results:  Surgical culture ( abdomen) ->  3+ gram positive cocci       Ht Readings from Last 1 Encounters:   24 1.829 m (6')        Wt Readings from Last 1 Encounters:   24 85.5 kg (188 lb 7.9 oz)         Estimated Creatinine Clearance: 104 mL/min (based on SCr of 0.9 mg/dL).    Assessment/Plan:  Day # 3 of vancomycin.  Vancomycin 1250 mg IV every 12 hours ordered.    Goal -600  Predicted AUC 24-48 hrs: 434  Predicted AUC steady state: 462    Continue current regimen     Thank you for the consult.   [facility]

## 2024-07-26 NOTE — PROGRESS NOTES
General and Vascular Surgery                                                           Daily Progress Note                                                             Armani Morgan PA-C    Pt Name: Davis Payton  Medical Record Number: [id number]  Date of Birth 1970   Today's Date: 7/26/2024    ASSESSMENT/PLAN  S/P (7/25/24): Incision and drainage of abdominal wall abscess.   Abscess site looks good. No purulent drainage. Pain improved. No fluctuance. Erythema improved. Dressing and packing changed  WBC count WNL  Hgb stable  No further general surgery needs at this time  OK t D/C from a general surgery standpoint when medically stable with antibiotics and wet-dry dressing changes. F/U with Dr. Middleton in 2 weeks.     EDUCATION  Patient educated about their illness/diagnosis, stated above, and all questions answered. We discussed the importance of nutrition, medications they are taking, and healthy lifestyle.  SUBJECTIVE  Davis has improved from yesterday. Pain is well controlled. He has no nausea and no vomiting. . He is tolerating regular diet. Current activity is ad agustina    OBJECTIVE  VITALS:  height is 1.829 m (6') and weight is 85.5 kg (188 lb 7.9 oz). His oral temperature is 97.4 °F (36.3 °C). His blood pressure is 117/71 and his pulse is 56. His respiration is 20 and oxygen saturation is 93%. VITALS:  /71   Pulse 56   Temp 97.4 °F (36.3 °C) (Oral)   Resp 20   Ht 1.829 m (6')   Wt 85.5 kg (188 lb 7.9 oz)   SpO2 93%   BMI 25.56 kg/m²   GENERAL: alert, no distress  LUNGS: clear to ausculation, without wheezes, rales or rhonci  HEART: normal rate and regular rhythm  ABDOMEN: soft, Incision and drainage site looks good: No purulent drainage, decreased erythema, pain improved, no fluctuance., non-distended  EXTREMITY: no cyanosis, clubbing or edema  I/O last 3 completed shifts:  In: 1330.1 [I.V.:808; IV Piggyback:522.2]  Out: -   I/O

## 2024-07-26 NOTE — PLAN OF CARE
Problem: Discharge Planning  Goal: Discharge to home or other facility with appropriate resources  Outcome: Progressing  Flowsheets  Taken 7/26/2024 0749 by Lola Everett RN  Discharge to home or other facility with appropriate resources:   Identify barriers to discharge with patient and caregiver   Arrange for needed discharge resources and transportation as appropriate   Identify discharge learning needs (meds, wound care, etc)     Problem: Pain  Goal: Verbalizes/displays adequate comfort level or baseline comfort level  Outcome: Progressing  Flowsheets (Taken 7/26/2024 0723)  Verbalizes/displays adequate comfort level or baseline comfort level:   Encourage patient to monitor pain and request assistance   Assess pain using appropriate pain scale   Administer analgesics based on type and severity of pain and evaluate response   Implement non-pharmacological measures as appropriate and evaluate response   Consider cultural and social influences on pain and pain management   Notify Licensed Independent Practitioner if interventions unsuccessful or patient reports new pain     Problem: Safety - Adult  Goal: Free from fall injury  Outcome: Progressing  Flowsheets (Taken 7/26/2024 0749)  Free From Fall Injury: Instruct family/caregiver on patient safety     Problem: Chronic Conditions and Co-morbidities  Goal: Patient's chronic conditions and co-morbidity symptoms are monitored and maintained or improved  Outcome: Progressing  Flowsheets  Taken 7/26/2024 0749 by Lola Everett RN  Care Plan - Patient's Chronic Conditions and Co-Morbidity Symptoms are Monitored and Maintained or Improved:   Monitor and assess patient's chronic conditions and comorbid symptoms for stability, deterioration, or improvement   Collaborate with multidisciplinary team to address chronic and comorbid conditions and prevent exacerbation or deterioration   Update acute care plan with appropriate goals if chronic or comorbid symptoms

## 2024-07-26 NOTE — OP NOTE
[facility]          [address] [place] [address]                            OPERATIVE REPORT      PATIENT NAME: SHIN SALINAS              : 1970  MED REC NO: [id number]                      ROOM: [place] [address]  ACCOUNT NO: [id number]                       ADMIT DATE: 2024  PROVIDER: Kurtis Middleton MD      DATE OF PROCEDURE:  2024    SURGEON:  Kurtis Middleton MD    PREOPERATIVE DIAGNOSIS:  Abdominal wall abscess.    POSTOPERATIVE DIAGNOSIS:  Abdominal wall abscess.    PROCEDURE:  Incision and drainage of abdominal wall abscess.    SPECIMEN:  Cultures of abdominal wall abscess.    ESTIMATED BLOOD LOSS:  Less than 50 mL.    COMPLICATIONS:  None.    DISPOSITION:  To Recovery in stable condition.    INDICATION:  The patient is a 54-year-old male who presented with acute worsening of a chronic collection in the subcutaneous tissue of his right lower quadrant.  This has been diagnosed dating back to at least .  The patient noted acute worsening over the last several days with increased pain, swelling, and now erythematous change with the overlying skin.  Etiology of this is unclear and he denies having previous intervention.  Risks, benefits, and alternatives of the drainage were reviewed, and he agreed to proceed.    DESCRIPTION OF PROCEDURE:  The patient was brought to the operating room, placed supine.  Anesthesia delivered and the right lower abdomen prepped and draped in a sterile fashion.  Local anesthetic was infused and a transverse incision made over the point of maximal fluctuance.  Dissection was carried into the subcutaneous tissue and a large abscess cavity encountered.  Thick purulent fluid was then evacuated, cultured and suctioned clear.  Once the abscess cavity had been evacuated, we did extend the incision slightly to aid in better exposure as there was some oozing around the entire surface of the abscess cavity.

## 2024-07-26 NOTE — PROGRESS NOTES
Patient back from the PACU after an ID with Dr. Middleton. Patient is sleeping but easy to arouse. Vital signs stable. Wife at bedside. No other concerns voiced at this moment.

## 2024-07-27 LAB
BACTERIA SPEC AEROBE CULT: ABNORMAL
GRAM STN SPEC: ABNORMAL
ORGANISM: ABNORMAL

## 2024-07-28 ENCOUNTER — PATIENT MESSAGE (OUTPATIENT)
Dept: FAMILY MEDICINE CLINIC | Age: 54
End: 2024-07-28

## 2024-07-28 LAB
BACTERIA SPEC AEROBE CULT: ABNORMAL
BACTERIA SPEC ANAEROBE CULT: ABNORMAL
GRAM STN SPEC: ABNORMAL
ORGANISM: ABNORMAL

## 2024-07-29 RX ORDER — ATORVASTATIN CALCIUM 40 MG/1
TABLET, FILM COATED ORAL
Qty: 90 TABLET | Refills: 1 | Status: SHIPPED | OUTPATIENT
Start: 2024-07-29

## 2024-07-29 RX ORDER — AMOXICILLIN AND CLAVULANATE POTASSIUM 875; 125 MG/1; MG/1
1 TABLET, FILM COATED ORAL 2 TIMES DAILY
Qty: 20 TABLET | Refills: 0 | Status: SHIPPED | OUTPATIENT
Start: 2024-07-29 | End: 2024-08-08

## 2024-07-29 RX ORDER — FLUOXETINE HYDROCHLORIDE 20 MG/1
CAPSULE ORAL
Qty: 90 CAPSULE | Refills: 1 | Status: SHIPPED | OUTPATIENT
Start: 2024-07-29

## 2024-07-29 NOTE — TELEPHONE ENCOUNTER
From: Davis Payton  To: Dr. David Lopez  Sent: 7/28/2024 3:21 PM EDT  Subject: Recent surgery and antibiotics     Good morning,    I was discharged from [facility] Friday afternoon after having an abscess removed from my belly. Dr. Middleton sent me home with the wound open and packed with instructions to clean it and I’m all fine there. Problem is in my haste to pack because we were running late, we left for [place] and I left the Augmentin he prescribed at home. I have been, since released from Friday at the hospital, without antibiotics. Is there anyway an emergency fill of the medication could be sent to the [facility] in [facility]? I have the phone number address, anything that you might need for that. Would really appreciate any help you might be able to provide!     Regards  Davis

## 2024-07-30 ENCOUNTER — CARE COORDINATION (OUTPATIENT)
Dept: CASE MANAGEMENT | Age: 54
End: 2024-07-30

## 2024-07-30 ENCOUNTER — POST-OP TELEPHONE (OUTPATIENT)
Dept: SURGERY | Age: 54
End: 2024-07-30

## 2024-07-30 DIAGNOSIS — L02.91 ABSCESS: Primary | ICD-10-CM

## 2024-07-30 RX ORDER — OXYCODONE HYDROCHLORIDE AND ACETAMINOPHEN 5; 325 MG/1; MG/1
1 TABLET ORAL EVERY 6 HOURS PRN
Qty: 12 TABLET | Refills: 0 | Status: SHIPPED | OUTPATIENT
Start: 2024-07-30 | End: 2024-08-02

## 2024-07-30 NOTE — CARE COORDINATION
Per chart review Davis is in [place] for vacation at this time. CT team will follow and reach out next week.    Magaly Garcia RN BSN  Care Transition Nurse  [phone]

## 2024-08-02 ENCOUNTER — TELEPHONE (OUTPATIENT)
Dept: FAMILY MEDICINE CLINIC | Age: 54
End: 2024-08-02

## 2024-08-02 RX ORDER — CLOPIDOGREL BISULFATE 75 MG/1
75 TABLET ORAL DAILY
Qty: 90 TABLET | Refills: 1 | Status: SHIPPED | OUTPATIENT
Start: 2024-08-02

## 2024-08-02 NOTE — TELEPHONE ENCOUNTER
Patient would like to have a rx for Plavix.  Was on in past by heart doctor.  Would you want to prescribe?

## 2024-08-02 NOTE — TELEPHONE ENCOUNTER
Think he is to be on Plavix long term due to prior stroke and unable to take aspirin due to gastric bypass.  I don't see any history of cardiac issues in his history.   Will order now for him to [facility] [address]    Thanks.

## 2024-08-02 NOTE — TELEPHONE ENCOUNTER
Pt called in needs a  prescription for the blood thinner called plavix  he said his heart Dr prescribed years ago and he no longer sees that  he wants someone to call to go over medications

## 2024-08-05 ENCOUNTER — OFFICE VISIT (OUTPATIENT)
Dept: SURGERY | Age: 54
End: 2024-08-05

## 2024-08-05 VITALS — BODY MASS INDEX: 25.47 KG/M2 | WEIGHT: 188 LBS | HEIGHT: 72 IN

## 2024-08-05 DIAGNOSIS — L02.211 ABSCESS OF ABDOMINAL WALL: Primary | ICD-10-CM

## 2024-08-05 PROCEDURE — 99024 POSTOP FOLLOW-UP VISIT: CPT | Performed by: SURGERY

## 2024-08-05 RX ORDER — OXYCODONE HYDROCHLORIDE AND ACETAMINOPHEN 5; 325 MG/1; MG/1
1 TABLET ORAL EVERY 6 HOURS PRN
Qty: 20 TABLET | Refills: 0 | Status: SHIPPED | OUTPATIENT
Start: 2024-08-05 | End: 2024-08-10

## 2024-08-05 NOTE — PROGRESS NOTES
Davis Payton (:  1970) is a 54 y.o. male,Established patient, here for evaluation of the following chief complaint(s):  Post-Op Check (Abscess drainage)      Assessment & Plan   1. Abscess of abdominal wall  -     oxyCODONE-acetaminophen (PERCOCET) 5-325 MG per tablet; Take 1 tablet by mouth every 6 hours as needed for Pain for up to 5 days. Intended supply: 5 days. Take lowest dose possible to manage pain Max Daily Amount: 4 tablets, Disp-20 tablet, R-0Normal      Continue daily wet to dry    Follow up in two weeks       Subjective   HPI  Doing well overall. Wound is clear and closing. Granulating well on exam today. No erythema or drainage. Continue wound care. Follow up in two weeks.    Review of Systems       Objective   Physical Exam       Electronically signed by HELEN REYES MD on 2024 at 3:06 PM        An electronic signature was used to authenticate this note.    --HELEN REYES MD

## 2024-08-06 ENCOUNTER — CARE COORDINATION (OUTPATIENT)
Dept: CASE MANAGEMENT | Age: 54
End: 2024-08-06

## 2024-08-06 NOTE — CARE COORDINATION
Care Transitions Note    Initial Call - Call within 2 business days of discharge: No    Patient Current Location:  [place]    Care Transition Nurse contacted the patient by telephone to perform post hospital discharge assessment, verified name and  as identifiers. Provided introduction to self, and explanation of the Care Transition Nurse role.     Patient: Davis Payton    Patient : 1970   MRN: [id number]    Reason for Admission: Swelling abd cellulitis   Discharge Date: 24  RURS: Readmission Risk Score: 10.8      Last Discharge Facility       Date Complaint Diagnosis Description Type Department Provider    24 Swelling Abdominal wall cellulitis ... ED to Hosp-Admission (Discharged) (ADMITTED) Arcadio Bunn MD; CAROL Brink.            Was this an external facility discharge? No    Additional needs identified to be addressed with provider   No needs identified             Method of communication with provider: none.    Patients top risk factors for readmission: medical condition-.    Interventions to address risk factors:   Education: .    Care Summary Note: CTN spoke with patient who reported he is doing better. Patient reported his abd abscess is improving and will continue wet to dry dressings at this time. Patient had follow up with surgeon and denied any changes with plan in care or medications.     Care Transition Nurse reviewed medical action plan with patient. The patient was given an opportunity to ask questions; all questions answered at this time.. The patient verbalized understanding.   Were discharge instructions available to patient? Yes.   Reviewed appropriate site of care based on symptoms and resources available to patient including: PCP  Specialist  When to call 911. The patient agrees to contact the primary care provider and/or specialist office for questions related to their healthcare.      Advance Care Planning:   Does patient have an Advance Directive: deferred

## 2024-08-13 ENCOUNTER — CARE COORDINATION (OUTPATIENT)
Dept: CASE MANAGEMENT | Age: 54
End: 2024-08-13

## 2024-08-19 ENCOUNTER — OFFICE VISIT (OUTPATIENT)
Dept: SURGERY | Age: 54
End: 2024-08-19

## 2024-08-19 VITALS — WEIGHT: 188 LBS | HEIGHT: 72 IN | BODY MASS INDEX: 25.47 KG/M2

## 2024-08-19 DIAGNOSIS — L02.211 ABSCESS OF ABDOMINAL WALL: Primary | ICD-10-CM

## 2024-08-19 PROCEDURE — 99024 POSTOP FOLLOW-UP VISIT: CPT | Performed by: SURGERY

## 2024-08-19 NOTE — PROGRESS NOTES
Davis Payton (:  1970) is a 54 y.o. male,Established patient, here for evaluation of the following chief complaint(s):  Post-Op Check (Abscess of abd wall)         Assessment & Plan  Abscess of abdominal wall     Follow up with me as needed                    Subjective   HPI  Doing well overall. Wound is closing and nearly healed. Continue dressing changes. Follow up with me as needed    Review of Systems       Objective   Physical Exam       Electronically signed by HELEN REYES MD on 2024 at 1:44 PM        An electronic signature was used to authenticate this note.    --HELEN REYES MD

## 2024-08-20 ENCOUNTER — CARE COORDINATION (OUTPATIENT)
Dept: CASE MANAGEMENT | Age: 54
End: 2024-08-20

## 2024-08-20 NOTE — CARE COORDINATION
Care Transitions Note    Follow Up Call     Patient Current Location:  Home: [address] [place] [address] [place] [address]    LPN Care Coordinator contacted the patient by telephone. Verified name and  as identifiers.    Additional needs identified to be addressed with provider   No needs identified                 Method of communication with provider: none.    Care Summary Note:   Patient reports he is doing well. He had f/u yesterday. He is healing well. Denies any wound complications. Appetite and fluid intake is sufficient. No bladder or bowel issues. No needs at this time.     Plan of care updates since last contact:          Advance Care Planning:   Does patient have an Advance Directive: reviewed and current.    Medication Review:  No changes since last call.     Remote Patient Monitoring:  Offered patient enrollment in the Remote Patient Monitoring (RPM) program for in-home monitoring: Patient is not eligible for RPM program because: patient does not have qualifying diagnosis.    Assessments:  Care Transitions Subsequent and Final Call    Subsequent and Final Calls  Do you have any ongoing symptoms?: No  Have your medications changed?: No  Do you have any questions related to your medications?: No  Do you currently have any active services?: Yes  Are you currently active with any services?: Home Health  Do you have any needs or concerns that I can assist you with?: No  Identified Barriers: None  Care Transitions Interventions  Other Interventions:              Follow Up Appointment:   KARLA appointment attended as scheduled   Future Appointments         Provider Specialty Dept Phone    2024 11:15 AM Kurtis Middleton MD General Surgery [phone]            LPN Care Coordinator provided contact information.  Plan for follow-up call in 6-10 days based on severity of symptoms and risk factors.  Plan for next call: self management-       Sandra Lund LPN

## 2024-08-26 ENCOUNTER — CARE COORDINATION (OUTPATIENT)
Dept: CASE MANAGEMENT | Age: 54
End: 2024-08-26

## 2024-08-26 NOTE — CARE COORDINATION
Care Transitions Note    Final Call     Patient Current Location:  Home: [address] [place] [address] [place] [address]    N Care Coordinator contacted the patient by telephone. Verified name and  as identifiers.    Patient graduated from the Care Transitions program on 2024.  Patient/family has the ability to self manage at this time..      Advance Care Planning:   Does patient have an Advance Directive: reviewed and current.    Handoff:   Patient was not referred to the ACM team due to no additional needs identified.       Care Summary Note:   Patient reports he is doing well. Abdominal wound is healing well. Area is smaller. Continues to use wet to dry dressing changes. Appetite and fluid intake is good. No problems with bladder or bowel elimination. Informed patient this is the final CTN call. Any medical concerns contact PCP. No questions or needs voiced. No further outreach scheduled.     Assessments:  Care Transitions Subsequent and Final Call    Subsequent and Final Calls  Do you have any ongoing symptoms?: No  Have your medications changed?: No  Do you have any questions related to your medications?: No  Do you currently have any active services?: No  Are you currently active with any services?: Home Health  Do you have any needs or concerns that I can assist you with?: No  Identified Barriers: None  Care Transitions Interventions  Other Interventions:              Upcoming Appointments:    Future Appointments         Provider Specialty Dept Phone    2024 11:15 AM Kurtis Middleton MD General Surgery [phone]            Patient has agreed to contact primary care provider and/or specialist for any further questions, concerns, or needs.    Sandra Lund LPN

## 2024-09-05 ENCOUNTER — OFFICE VISIT (OUTPATIENT)
Dept: SURGERY | Age: 54
End: 2024-09-05

## 2024-09-05 VITALS — HEIGHT: 72 IN | WEIGHT: 188 LBS | BODY MASS INDEX: 25.47 KG/M2

## 2024-09-05 DIAGNOSIS — L02.211 ABSCESS OF ABDOMINAL WALL: Primary | ICD-10-CM

## 2024-09-05 PROCEDURE — 99024 POSTOP FOLLOW-UP VISIT: CPT | Performed by: SURGERY

## 2024-09-05 NOTE — PROGRESS NOTES
Davis Payton (:  1970) is a 54 y.o. male,Established patient, here for evaluation of the following chief complaint(s):  Post-Op Check (Site check)         Assessment & Plan  Abscess of abdominal wall     Follow up in 2-3 weeks                Subjective   HPI  Doing well overall. Wound continues to close. Granulating, serous drainage only, no infection. Continue wound care and follow up in 2-3 weeks.    Review of Systems       Objective   Physical Exam       Electronically signed by HELEN REYES MD on 2024 at 3:57 PM        An electronic signature was used to authenticate this note.    --HELEN REYES MD

## 2024-09-12 ENCOUNTER — OFFICE VISIT (OUTPATIENT)
Dept: FAMILY MEDICINE CLINIC | Age: 54
End: 2024-09-12

## 2024-09-12 VITALS
WEIGHT: 190.6 LBS | BODY MASS INDEX: 25.84 KG/M2 | SYSTOLIC BLOOD PRESSURE: 102 MMHG | DIASTOLIC BLOOD PRESSURE: 70 MMHG | HEART RATE: 60 BPM | OXYGEN SATURATION: 97 % | RESPIRATION RATE: 18 BRPM

## 2024-09-12 DIAGNOSIS — F33.1 MODERATE EPISODE OF RECURRENT MAJOR DEPRESSIVE DISORDER (HCC): ICD-10-CM

## 2024-09-12 DIAGNOSIS — Z96.659 CHRONIC KNEE PAIN AFTER TOTAL REPLACEMENT OF KNEE JOINT: ICD-10-CM

## 2024-09-12 DIAGNOSIS — M47.819 ARTHRITIS OF SPINE: Primary | ICD-10-CM

## 2024-09-12 DIAGNOSIS — G89.29 CHRONIC KNEE PAIN AFTER TOTAL REPLACEMENT OF KNEE JOINT: ICD-10-CM

## 2024-09-12 DIAGNOSIS — M25.569 CHRONIC KNEE PAIN AFTER TOTAL REPLACEMENT OF KNEE JOINT: ICD-10-CM

## 2024-09-12 DIAGNOSIS — S20.211A CHEST WALL CONTUSION, RIGHT, INITIAL ENCOUNTER: ICD-10-CM

## 2024-09-12 PROBLEM — L03.90 CELLULITIS: Status: RESOLVED | Noted: 2024-07-24 | Resolved: 2024-09-12

## 2024-09-12 PROBLEM — Z86.718 HISTORY OF DVT (DEEP VEIN THROMBOSIS): Status: ACTIVE | Noted: 2024-06-13

## 2024-09-12 RX ORDER — LAMOTRIGINE 25 MG/1
25 TABLET ORAL 2 TIMES DAILY
Qty: 75 TABLET | Refills: 0 | Status: SHIPPED | OUTPATIENT
Start: 2024-09-12

## 2024-09-12 RX ORDER — CELECOXIB 200 MG/1
200 CAPSULE ORAL DAILY
Qty: 30 CAPSULE | Refills: 0 | Status: SHIPPED | OUTPATIENT
Start: 2024-09-12

## 2024-09-22 ENCOUNTER — APPOINTMENT (OUTPATIENT)
Dept: CT IMAGING | Age: 54
End: 2024-09-22
Payer: COMMERCIAL

## 2024-09-22 ENCOUNTER — HOSPITAL ENCOUNTER (EMERGENCY)
Age: 54
Discharge: HOME | End: 2024-09-22
Attending: EMERGENCY MEDICINE
Payer: COMMERCIAL

## 2024-09-22 VITALS
SYSTOLIC BLOOD PRESSURE: 117 MMHG | RESPIRATION RATE: 16 BRPM | HEIGHT: 71 IN | WEIGHT: 187.39 LBS | BODY MASS INDEX: 26.23 KG/M2 | OXYGEN SATURATION: 98 % | HEART RATE: 55 BPM | DIASTOLIC BLOOD PRESSURE: 77 MMHG | TEMPERATURE: 98 F

## 2024-09-22 DIAGNOSIS — R18.8 ABDOMINAL WALL FLUID COLLECTIONS: Primary | ICD-10-CM

## 2024-09-22 LAB
ALBUMIN SERPL-MCNC: 3.9 G/DL (ref 3.4–5)
ALBUMIN/GLOB SERPL: 2 {RATIO} (ref 1.1–2.2)
ALP SERPL-CCNC: 90 U/L (ref 40–129)
ALT SERPL-CCNC: <5 U/L (ref 10–40)
ANION GAP SERPL CALCULATED.3IONS-SCNC: 9 MMOL/L (ref 3–16)
AST SERPL-CCNC: 11 U/L (ref 15–37)
BASOPHILS # BLD: 0.2 K/UL (ref 0–0.2)
BASOPHILS NFR BLD: 2.1 %
BILIRUB SERPL-MCNC: <0.2 MG/DL (ref 0–1)
BILIRUB UR QL STRIP.AUTO: NEGATIVE
BUN SERPL-MCNC: 10 MG/DL (ref 7–20)
CALCIUM SERPL-MCNC: 9 MG/DL (ref 8.3–10.6)
CHLORIDE SERPL-SCNC: 106 MMOL/L (ref 99–110)
CLARITY UR: CLEAR
CO2 SERPL-SCNC: 24 MMOL/L (ref 21–32)
COLOR UR: YELLOW
CREAT SERPL-MCNC: 1.1 MG/DL (ref 0.9–1.3)
DEPRECATED RDW RBC AUTO: 16.7 % (ref 12.4–15.4)
EOSINOPHIL # BLD: 0.4 K/UL (ref 0–0.6)
EOSINOPHIL NFR BLD: 5.1 %
GFR SERPLBLD CREATININE-BSD FMLA CKD-EPI: 80 ML/MIN/{1.73_M2}
GLUCOSE SERPL-MCNC: 83 MG/DL (ref 70–99)
GLUCOSE UR STRIP.AUTO-MCNC: NEGATIVE MG/DL
HCT VFR BLD AUTO: 36 % (ref 40.5–52.5)
HGB BLD-MCNC: 12 G/DL (ref 13.5–17.5)
HGB UR QL STRIP.AUTO: NEGATIVE
KETONES UR STRIP.AUTO-MCNC: ABNORMAL MG/DL
LACTATE BLDV-SCNC: 1 MMOL/L (ref 0.4–2)
LEUKOCYTE ESTERASE UR QL STRIP.AUTO: NEGATIVE
LIPASE SERPL-CCNC: 99 U/L (ref 13–60)
LYMPHOCYTES # BLD: 3.1 K/UL (ref 1–5.1)
LYMPHOCYTES NFR BLD: 39.1 %
MCH RBC QN AUTO: 30.2 PG (ref 26–34)
MCHC RBC AUTO-ENTMCNC: 33.5 G/DL (ref 31–36)
MCV RBC AUTO: 90.2 FL (ref 80–100)
MONOCYTES # BLD: 0.4 K/UL (ref 0–1.3)
MONOCYTES NFR BLD: 5.6 %
NEUTROPHILS # BLD: 3.8 K/UL (ref 1.7–7.7)
NEUTROPHILS NFR BLD: 48.1 %
NITRITE UR QL STRIP.AUTO: NEGATIVE
PH UR STRIP.AUTO: 6 [PH] (ref 5–8)
PLATELET # BLD AUTO: 289 K/UL (ref 135–450)
PMV BLD AUTO: 8.8 FL (ref 5–10.5)
POTASSIUM SERPL-SCNC: 4.2 MMOL/L (ref 3.5–5.1)
PROT SERPL-MCNC: 5.9 G/DL (ref 6.4–8.2)
PROT UR STRIP.AUTO-MCNC: NEGATIVE MG/DL
RBC # BLD AUTO: 3.99 M/UL (ref 4.2–5.9)
SODIUM SERPL-SCNC: 139 MMOL/L (ref 136–145)
SP GR UR STRIP.AUTO: >=1.03 (ref 1–1.03)
UA COMPLETE W REFLEX CULTURE PNL UR: ABNORMAL
UA DIPSTICK W REFLEX MICRO PNL UR: ABNORMAL
URN SPEC COLLECT METH UR: ABNORMAL
UROBILINOGEN UR STRIP-ACNC: 1 E.U./DL
WBC # BLD AUTO: 7.8 K/UL (ref 4–11)

## 2024-09-22 PROCEDURE — 96374 THER/PROPH/DIAG INJ IV PUSH: CPT

## 2024-09-22 PROCEDURE — 80053 COMPREHEN METABOLIC PANEL: CPT

## 2024-09-22 PROCEDURE — 6360000002 HC RX W HCPCS: Performed by: EMERGENCY MEDICINE

## 2024-09-22 PROCEDURE — 83690 ASSAY OF LIPASE: CPT

## 2024-09-22 PROCEDURE — 96375 TX/PRO/DX INJ NEW DRUG ADDON: CPT

## 2024-09-22 PROCEDURE — 36415 COLL VENOUS BLD VENIPUNCTURE: CPT

## 2024-09-22 PROCEDURE — 83605 ASSAY OF LACTIC ACID: CPT

## 2024-09-22 PROCEDURE — 74177 CT ABD & PELVIS W/CONTRAST: CPT

## 2024-09-22 PROCEDURE — 85025 COMPLETE CBC W/AUTO DIFF WBC: CPT

## 2024-09-22 PROCEDURE — 81003 URINALYSIS AUTO W/O SCOPE: CPT

## 2024-09-22 PROCEDURE — 6360000004 HC RX CONTRAST MEDICATION: Performed by: EMERGENCY MEDICINE

## 2024-09-22 PROCEDURE — 99285 EMERGENCY DEPT VISIT HI MDM: CPT

## 2024-09-22 RX ORDER — TRAMADOL HYDROCHLORIDE 50 MG/1
50 TABLET ORAL EVERY 4 HOURS PRN
Qty: 18 TABLET | Refills: 0 | Status: SHIPPED | OUTPATIENT
Start: 2024-09-22 | End: 2024-09-25

## 2024-09-22 RX ORDER — ONDANSETRON 2 MG/ML
8 INJECTION INTRAMUSCULAR; INTRAVENOUS ONCE
Status: COMPLETED | OUTPATIENT
Start: 2024-09-22 | End: 2024-09-22

## 2024-09-22 RX ADMIN — HYDROMORPHONE HYDROCHLORIDE 1 MG: 1 INJECTION, SOLUTION INTRAMUSCULAR; INTRAVENOUS; SUBCUTANEOUS at 19:38

## 2024-09-22 RX ADMIN — ONDANSETRON 8 MG: 2 INJECTION INTRAMUSCULAR; INTRAVENOUS at 19:37

## 2024-09-22 RX ADMIN — IOMEPROL INJECTION 80 ML: 714 INJECTION, SOLUTION INTRAVASCULAR at 20:04

## 2024-09-22 ASSESSMENT — PAIN DESCRIPTION - PAIN TYPE: TYPE: ACUTE PAIN

## 2024-09-22 ASSESSMENT — PAIN SCALES - GENERAL
PAINLEVEL_OUTOF10: 8

## 2024-09-22 ASSESSMENT — PAIN DESCRIPTION - ORIENTATION: ORIENTATION: RIGHT

## 2024-09-22 ASSESSMENT — PAIN DESCRIPTION - ONSET: ONSET: ON-GOING

## 2024-09-22 ASSESSMENT — PAIN DESCRIPTION - LOCATION: LOCATION: ABDOMEN

## 2024-09-22 ASSESSMENT — PAIN DESCRIPTION - DESCRIPTORS: DESCRIPTORS: ACHING;BURNING

## 2024-09-22 ASSESSMENT — PAIN DESCRIPTION - FREQUENCY: FREQUENCY: CONTINUOUS

## 2024-09-22 ASSESSMENT — PAIN - FUNCTIONAL ASSESSMENT: PAIN_FUNCTIONAL_ASSESSMENT: 0-10

## 2024-09-25 ENCOUNTER — PATIENT MESSAGE (OUTPATIENT)
Dept: FAMILY MEDICINE CLINIC | Age: 54
End: 2024-09-25

## 2024-09-25 RX ORDER — CLOPIDOGREL BISULFATE 75 MG/1
75 TABLET ORAL DAILY
Qty: 90 TABLET | Refills: 1 | Status: SHIPPED | OUTPATIENT
Start: 2024-09-25

## 2024-09-26 ENCOUNTER — OFFICE VISIT (OUTPATIENT)
Dept: SURGERY | Age: 54
End: 2024-09-26

## 2024-09-26 VITALS — WEIGHT: 187 LBS | BODY MASS INDEX: 26.18 KG/M2 | HEIGHT: 71 IN

## 2024-09-26 DIAGNOSIS — L02.211 ABSCESS OF ABDOMINAL WALL: Primary | ICD-10-CM

## 2024-09-26 PROCEDURE — 99024 POSTOP FOLLOW-UP VISIT: CPT | Performed by: SURGERY

## 2024-10-07 DIAGNOSIS — M47.819 ARTHRITIS OF SPINE: ICD-10-CM

## 2024-10-07 RX ORDER — CELECOXIB 200 MG/1
200 CAPSULE ORAL DAILY
Qty: 30 CAPSULE | Refills: 0 | Status: SHIPPED | OUTPATIENT
Start: 2024-10-07

## 2024-10-10 ENCOUNTER — OFFICE VISIT (OUTPATIENT)
Dept: FAMILY MEDICINE CLINIC | Age: 54
End: 2024-10-10
Payer: COMMERCIAL

## 2024-10-10 VITALS
HEART RATE: 52 BPM | SYSTOLIC BLOOD PRESSURE: 110 MMHG | RESPIRATION RATE: 18 BRPM | WEIGHT: 185 LBS | DIASTOLIC BLOOD PRESSURE: 80 MMHG | OXYGEN SATURATION: 98 % | BODY MASS INDEX: 25.81 KG/M2

## 2024-10-10 DIAGNOSIS — F33.1 MODERATE EPISODE OF RECURRENT MAJOR DEPRESSIVE DISORDER (HCC): ICD-10-CM

## 2024-10-10 DIAGNOSIS — G43.711 INTRACTABLE CHRONIC MIGRAINE WITHOUT AURA AND WITH STATUS MIGRAINOSUS: ICD-10-CM

## 2024-10-10 DIAGNOSIS — G89.4 PAIN SYNDROME, CHRONIC: Primary | ICD-10-CM

## 2024-10-10 PROCEDURE — 99215 OFFICE O/P EST HI 40 MIN: CPT | Performed by: FAMILY MEDICINE

## 2024-10-10 NOTE — PROGRESS NOTES
depressive disorder (HCC)  - mood is strained right now.  He may consider starting Lamictal.  Continue fluox 20 for now.      Return in about 2 months (around 12/10/2024).    Total time spent on this encounter: 45 min    An  [employer] was used to authenticate this note.    --David Lopez MD on 10/10/2024 at 10:34 AM

## 2024-10-14 ENCOUNTER — APPOINTMENT (OUTPATIENT)
Dept: CT IMAGING | Age: 54
DRG: 390 | End: 2024-10-14
Payer: COMMERCIAL

## 2024-10-14 ENCOUNTER — HOSPITAL ENCOUNTER (INPATIENT)
Age: 54
LOS: 2 days | Discharge: HOME | DRG: 390 | End: 2024-10-17
Attending: EMERGENCY MEDICINE | Admitting: HOSPITALIST
Payer: COMMERCIAL

## 2024-10-14 DIAGNOSIS — R10.84 GENERALIZED ABDOMINAL PAIN: ICD-10-CM

## 2024-10-14 DIAGNOSIS — K56.609 SBO (SMALL BOWEL OBSTRUCTION) (HCC): ICD-10-CM

## 2024-10-14 DIAGNOSIS — R11.2 NAUSEA AND VOMITING, UNSPECIFIED VOMITING TYPE: Primary | ICD-10-CM

## 2024-10-14 LAB
ALBUMIN SERPL-MCNC: 4.3 G/DL (ref 3.4–5)
ALBUMIN/GLOB SERPL: 2 {RATIO} (ref 1.1–2.2)
ALP SERPL-CCNC: 106 U/L (ref 40–129)
ALT SERPL-CCNC: 9 U/L (ref 10–40)
ANION GAP SERPL CALCULATED.3IONS-SCNC: 12 MMOL/L (ref 3–16)
AST SERPL-CCNC: 15 U/L (ref 15–37)
BASOPHILS # BLD: 0.2 K/UL (ref 0–0.2)
BASOPHILS NFR BLD: 2 %
BILIRUB SERPL-MCNC: 0.3 MG/DL (ref 0–1)
BILIRUB UR QL STRIP.AUTO: NEGATIVE
BUN SERPL-MCNC: 9 MG/DL (ref 7–20)
CALCIUM SERPL-MCNC: 9.2 MG/DL (ref 8.3–10.6)
CHLORIDE SERPL-SCNC: 106 MMOL/L (ref 99–110)
CLARITY UR: CLEAR
CO2 SERPL-SCNC: 23 MMOL/L (ref 21–32)
COLOR UR: YELLOW
CREAT SERPL-MCNC: 0.8 MG/DL (ref 0.9–1.3)
DEPRECATED RDW RBC AUTO: 16.7 % (ref 12.4–15.4)
EOSINOPHIL # BLD: 0.2 K/UL (ref 0–0.6)
EOSINOPHIL NFR BLD: 2 %
GFR SERPLBLD CREATININE-BSD FMLA CKD-EPI: >90 ML/MIN/{1.73_M2}
GLUCOSE SERPL-MCNC: 101 MG/DL (ref 70–99)
GLUCOSE UR STRIP.AUTO-MCNC: NEGATIVE MG/DL
HCT VFR BLD AUTO: 38.1 % (ref 40.5–52.5)
HGB BLD-MCNC: 12.9 G/DL (ref 13.5–17.5)
HGB UR QL STRIP.AUTO: NEGATIVE
KETONES UR STRIP.AUTO-MCNC: NEGATIVE MG/DL
LEUKOCYTE ESTERASE UR QL STRIP.AUTO: NEGATIVE
LIPASE SERPL-CCNC: 31 U/L (ref 13–60)
LYMPHOCYTES # BLD: 2 K/UL (ref 1–5.1)
LYMPHOCYTES NFR BLD: 21 %
MCH RBC QN AUTO: 30.1 PG (ref 26–34)
MCHC RBC AUTO-ENTMCNC: 33.8 G/DL (ref 31–36)
MCV RBC AUTO: 89.1 FL (ref 80–100)
MONOCYTES # BLD: 0.2 K/UL (ref 0–1.3)
MONOCYTES NFR BLD: 2 %
NEUTROPHILS # BLD: 6.8 K/UL (ref 1.7–7.7)
NEUTROPHILS NFR BLD: 73 %
NITRITE UR QL STRIP.AUTO: NEGATIVE
PH UR STRIP.AUTO: 5.5 [PH] (ref 5–8)
PLATELET # BLD AUTO: 301 K/UL (ref 135–450)
PMV BLD AUTO: 8.7 FL (ref 5–10.5)
POTASSIUM SERPL-SCNC: 4.3 MMOL/L (ref 3.5–5.1)
PROT SERPL-MCNC: 6.5 G/DL (ref 6.4–8.2)
PROT UR STRIP.AUTO-MCNC: NEGATIVE MG/DL
RBC # BLD AUTO: 4.27 M/UL (ref 4.2–5.9)
SODIUM SERPL-SCNC: 141 MMOL/L (ref 136–145)
SP GR UR STRIP.AUTO: >=1.03 (ref 1–1.03)
UA COMPLETE W REFLEX CULTURE PNL UR: NORMAL
UA DIPSTICK W REFLEX MICRO PNL UR: NORMAL
URN SPEC COLLECT METH UR: NORMAL
UROBILINOGEN UR STRIP-ACNC: 1 E.U./DL
WBC # BLD AUTO: 9.3 K/UL (ref 4–11)

## 2024-10-14 PROCEDURE — 85025 COMPLETE CBC W/AUTO DIFF WBC: CPT

## 2024-10-14 PROCEDURE — 80053 COMPREHEN METABOLIC PANEL: CPT

## 2024-10-14 PROCEDURE — 96374 THER/PROPH/DIAG INJ IV PUSH: CPT

## 2024-10-14 PROCEDURE — 96375 TX/PRO/DX INJ NEW DRUG ADDON: CPT

## 2024-10-14 PROCEDURE — 74177 CT ABD & PELVIS W/CONTRAST: CPT

## 2024-10-14 PROCEDURE — 83690 ASSAY OF LIPASE: CPT

## 2024-10-14 PROCEDURE — 6360000002 HC RX W HCPCS: Performed by: EMERGENCY MEDICINE

## 2024-10-14 PROCEDURE — 81003 URINALYSIS AUTO W/O SCOPE: CPT

## 2024-10-14 PROCEDURE — 99285 EMERGENCY DEPT VISIT HI MDM: CPT

## 2024-10-14 PROCEDURE — 96372 THER/PROPH/DIAG INJ SC/IM: CPT

## 2024-10-14 PROCEDURE — 36415 COLL VENOUS BLD VENIPUNCTURE: CPT

## 2024-10-14 PROCEDURE — 6360000004 HC RX CONTRAST MEDICATION: Performed by: EMERGENCY MEDICINE

## 2024-10-14 RX ORDER — ONDANSETRON 2 MG/ML
4 INJECTION INTRAMUSCULAR; INTRAVENOUS ONCE
Status: COMPLETED | OUTPATIENT
Start: 2024-10-14 | End: 2024-10-14

## 2024-10-14 RX ORDER — METOCLOPRAMIDE HYDROCHLORIDE 5 MG/ML
10 INJECTION INTRAMUSCULAR; INTRAVENOUS ONCE
Status: COMPLETED | OUTPATIENT
Start: 2024-10-14 | End: 2024-10-14

## 2024-10-14 RX ORDER — DIPHENHYDRAMINE HYDROCHLORIDE 50 MG/ML
25 INJECTION INTRAMUSCULAR; INTRAVENOUS ONCE
Status: COMPLETED | OUTPATIENT
Start: 2024-10-14 | End: 2024-10-14

## 2024-10-14 RX ORDER — DICYCLOMINE HYDROCHLORIDE 10 MG/ML
20 INJECTION INTRAMUSCULAR ONCE
Status: COMPLETED | OUTPATIENT
Start: 2024-10-14 | End: 2024-10-14

## 2024-10-14 RX ORDER — HALOPERIDOL 5 MG/ML
2 INJECTION INTRAMUSCULAR ONCE
Status: COMPLETED | OUTPATIENT
Start: 2024-10-14 | End: 2024-10-14

## 2024-10-14 RX ADMIN — DICYCLOMINE HYDROCHLORIDE 20 MG: 20 INJECTION, SOLUTION INTRAMUSCULAR at 21:43

## 2024-10-14 RX ADMIN — IOMEPROL INJECTION 75 ML: 714 INJECTION, SOLUTION INTRAVASCULAR at 22:55

## 2024-10-14 RX ADMIN — ONDANSETRON 4 MG: 2 INJECTION INTRAMUSCULAR; INTRAVENOUS at 23:33

## 2024-10-14 RX ADMIN — HALOPERIDOL LACTATE 2 MG: 5 INJECTION, SOLUTION INTRAMUSCULAR at 21:44

## 2024-10-14 RX ADMIN — HYDROMORPHONE HYDROCHLORIDE 0.5 MG: 1 INJECTION, SOLUTION INTRAMUSCULAR; INTRAVENOUS; SUBCUTANEOUS at 23:33

## 2024-10-14 RX ADMIN — DIPHENHYDRAMINE HYDROCHLORIDE 25 MG: 50 INJECTION, SOLUTION INTRAMUSCULAR; INTRAVENOUS at 22:33

## 2024-10-14 RX ADMIN — METOCLOPRAMIDE 10 MG: 5 INJECTION, SOLUTION INTRAMUSCULAR; INTRAVENOUS at 22:33

## 2024-10-14 ASSESSMENT — PAIN DESCRIPTION - PAIN TYPE: TYPE: ACUTE PAIN

## 2024-10-14 ASSESSMENT — PAIN DESCRIPTION - FREQUENCY: FREQUENCY: CONTINUOUS

## 2024-10-14 ASSESSMENT — PAIN SCALES - GENERAL
PAINLEVEL_OUTOF10: 6
PAINLEVEL_OUTOF10: 8
PAINLEVEL_OUTOF10: 7
PAINLEVEL_OUTOF10: 8

## 2024-10-14 ASSESSMENT — PAIN DESCRIPTION - ORIENTATION: ORIENTATION: LOWER

## 2024-10-14 ASSESSMENT — PAIN DESCRIPTION - DESCRIPTORS: DESCRIPTORS: CRAMPING

## 2024-10-14 ASSESSMENT — PAIN DESCRIPTION - ONSET: ONSET: ON-GOING

## 2024-10-14 ASSESSMENT — PAIN DESCRIPTION - LOCATION: LOCATION: ABDOMEN

## 2024-10-15 ENCOUNTER — APPOINTMENT (OUTPATIENT)
Dept: CT IMAGING | Age: 54
DRG: 390 | End: 2024-10-15
Payer: COMMERCIAL

## 2024-10-15 PROBLEM — K56.609 SMALL BOWEL OBSTRUCTION (HCC): Status: ACTIVE | Noted: 2024-10-15

## 2024-10-15 LAB
EKG ATRIAL RATE: 57 BPM
EKG DIAGNOSIS: NORMAL
EKG P AXIS: 66 DEGREES
EKG P-R INTERVAL: 156 MS
EKG Q-T INTERVAL: 468 MS
EKG QRS DURATION: 90 MS
EKG QTC CALCULATION (BAZETT): 455 MS
EKG R AXIS: 24 DEGREES
EKG T AXIS: 58 DEGREES
EKG VENTRICULAR RATE: 57 BPM

## 2024-10-15 PROCEDURE — 6360000004 HC RX CONTRAST MEDICATION: Performed by: FAMILY MEDICINE

## 2024-10-15 PROCEDURE — 96376 TX/PRO/DX INJ SAME DRUG ADON: CPT

## 2024-10-15 PROCEDURE — 6370000000 HC RX 637 (ALT 250 FOR IP): Performed by: HOSPITALIST

## 2024-10-15 PROCEDURE — 99222 1ST HOSP IP/OBS MODERATE 55: CPT | Performed by: SURGERY

## 2024-10-15 PROCEDURE — 94760 N-INVAS EAR/PLS OXIMETRY 1: CPT

## 2024-10-15 PROCEDURE — 1200000000 HC SEMI PRIVATE

## 2024-10-15 PROCEDURE — 93005 ELECTROCARDIOGRAM TRACING: CPT | Performed by: EMERGENCY MEDICINE

## 2024-10-15 PROCEDURE — 2580000003 HC RX 258: Performed by: SURGERY

## 2024-10-15 PROCEDURE — 6360000002 HC RX W HCPCS: Performed by: EMERGENCY MEDICINE

## 2024-10-15 PROCEDURE — 74176 CT ABD & PELVIS W/O CONTRAST: CPT

## 2024-10-15 PROCEDURE — 6360000002 HC RX W HCPCS: Performed by: HOSPITALIST

## 2024-10-15 PROCEDURE — 6370000000 HC RX 637 (ALT 250 FOR IP): Performed by: INTERNAL MEDICINE

## 2024-10-15 PROCEDURE — 6360000002 HC RX W HCPCS: Performed by: SURGERY

## 2024-10-15 PROCEDURE — 2580000003 HC RX 258: Performed by: HOSPITALIST

## 2024-10-15 PROCEDURE — 93010 ELECTROCARDIOGRAM REPORT: CPT | Performed by: STUDENT IN AN ORGANIZED HEALTH CARE EDUCATION/TRAINING PROGRAM

## 2024-10-15 RX ORDER — GABAPENTIN 300 MG/1
600 CAPSULE ORAL 4 TIMES DAILY
Status: DISCONTINUED | OUTPATIENT
Start: 2024-10-15 | End: 2024-10-17 | Stop reason: HOSPADM

## 2024-10-15 RX ORDER — POLYETHYLENE GLYCOL 3350 17 G/17G
17 POWDER, FOR SOLUTION ORAL DAILY PRN
Status: DISCONTINUED | OUTPATIENT
Start: 2024-10-15 | End: 2024-10-17 | Stop reason: HOSPADM

## 2024-10-15 RX ORDER — POTASSIUM CHLORIDE 7.45 MG/ML
10 INJECTION INTRAVENOUS PRN
Status: DISCONTINUED | OUTPATIENT
Start: 2024-10-15 | End: 2024-10-17 | Stop reason: HOSPADM

## 2024-10-15 RX ORDER — POTASSIUM CHLORIDE 1500 MG/1
40 TABLET, EXTENDED RELEASE ORAL PRN
Status: DISCONTINUED | OUTPATIENT
Start: 2024-10-15 | End: 2024-10-17 | Stop reason: HOSPADM

## 2024-10-15 RX ORDER — IOPAMIDOL 755 MG/ML
75 INJECTION, SOLUTION INTRAVASCULAR
Status: CANCELLED | OUTPATIENT
Start: 2024-10-15

## 2024-10-15 RX ORDER — IOPAMIDOL 612 MG/ML
50 INJECTION, SOLUTION INTRAVASCULAR
Status: COMPLETED | OUTPATIENT
Start: 2024-10-15 | End: 2024-10-15

## 2024-10-15 RX ORDER — SODIUM CHLORIDE 0.9 % (FLUSH) 0.9 %
5-40 SYRINGE (ML) INJECTION EVERY 12 HOURS SCHEDULED
Status: DISCONTINUED | OUTPATIENT
Start: 2024-10-15 | End: 2024-10-17 | Stop reason: HOSPADM

## 2024-10-15 RX ORDER — MAGNESIUM SULFATE IN WATER 40 MG/ML
2000 INJECTION, SOLUTION INTRAVENOUS PRN
Status: DISCONTINUED | OUTPATIENT
Start: 2024-10-15 | End: 2024-10-17 | Stop reason: HOSPADM

## 2024-10-15 RX ORDER — TRAZODONE HYDROCHLORIDE 100 MG/1
200 TABLET ORAL NIGHTLY PRN
Status: DISCONTINUED | OUTPATIENT
Start: 2024-10-15 | End: 2024-10-17 | Stop reason: HOSPADM

## 2024-10-15 RX ORDER — ATORVASTATIN CALCIUM 40 MG/1
40 TABLET, FILM COATED ORAL NIGHTLY
Status: DISCONTINUED | OUTPATIENT
Start: 2024-10-15 | End: 2024-10-17 | Stop reason: HOSPADM

## 2024-10-15 RX ORDER — SODIUM CHLORIDE 0.9 % (FLUSH) 0.9 %
5-40 SYRINGE (ML) INJECTION PRN
Status: DISCONTINUED | OUTPATIENT
Start: 2024-10-15 | End: 2024-10-17 | Stop reason: HOSPADM

## 2024-10-15 RX ORDER — ONDANSETRON 4 MG/1
4 TABLET, ORALLY DISINTEGRATING ORAL EVERY 8 HOURS PRN
Status: DISCONTINUED | OUTPATIENT
Start: 2024-10-15 | End: 2024-10-17 | Stop reason: HOSPADM

## 2024-10-15 RX ORDER — ENOXAPARIN SODIUM 100 MG/ML
40 INJECTION SUBCUTANEOUS DAILY
Status: DISCONTINUED | OUTPATIENT
Start: 2024-10-15 | End: 2024-10-17 | Stop reason: HOSPADM

## 2024-10-15 RX ORDER — ONDANSETRON 2 MG/ML
4 INJECTION INTRAMUSCULAR; INTRAVENOUS EVERY 6 HOURS PRN
Status: DISCONTINUED | OUTPATIENT
Start: 2024-10-15 | End: 2024-10-17 | Stop reason: HOSPADM

## 2024-10-15 RX ORDER — ACETAMINOPHEN 650 MG/1
650 SUPPOSITORY RECTAL EVERY 6 HOURS PRN
Status: DISCONTINUED | OUTPATIENT
Start: 2024-10-15 | End: 2024-10-17 | Stop reason: HOSPADM

## 2024-10-15 RX ORDER — CELECOXIB 200 MG/1
200 CAPSULE ORAL DAILY
Status: DISCONTINUED | OUTPATIENT
Start: 2024-10-16 | End: 2024-10-17 | Stop reason: HOSPADM

## 2024-10-15 RX ORDER — PANTOPRAZOLE SODIUM 40 MG/1
40 TABLET, DELAYED RELEASE ORAL
Status: DISCONTINUED | OUTPATIENT
Start: 2024-10-15 | End: 2024-10-17 | Stop reason: HOSPADM

## 2024-10-15 RX ORDER — ONDANSETRON 2 MG/ML
4 INJECTION INTRAMUSCULAR; INTRAVENOUS ONCE
Status: COMPLETED | OUTPATIENT
Start: 2024-10-15 | End: 2024-10-15

## 2024-10-15 RX ORDER — ACETAMINOPHEN 325 MG/1
650 TABLET ORAL EVERY 6 HOURS PRN
Status: DISCONTINUED | OUTPATIENT
Start: 2024-10-15 | End: 2024-10-17 | Stop reason: HOSPADM

## 2024-10-15 RX ORDER — SODIUM CHLORIDE 9 MG/ML
INJECTION, SOLUTION INTRAVENOUS PRN
Status: DISCONTINUED | OUTPATIENT
Start: 2024-10-15 | End: 2024-10-17 | Stop reason: HOSPADM

## 2024-10-15 RX ADMIN — ONDANSETRON 4 MG: 2 INJECTION INTRAMUSCULAR; INTRAVENOUS at 02:24

## 2024-10-15 RX ADMIN — ATORVASTATIN CALCIUM 40 MG: 40 TABLET, FILM COATED ORAL at 21:56

## 2024-10-15 RX ADMIN — GABAPENTIN 600 MG: 300 CAPSULE ORAL at 13:31

## 2024-10-15 RX ADMIN — TRAZODONE HYDROCHLORIDE 200 MG: 100 TABLET ORAL at 21:56

## 2024-10-15 RX ADMIN — HYDROMORPHONE HYDROCHLORIDE 0.5 MG: 1 INJECTION, SOLUTION INTRAMUSCULAR; INTRAVENOUS; SUBCUTANEOUS at 05:02

## 2024-10-15 RX ADMIN — PIPERACILLIN AND TAZOBACTAM 4500 MG: 4; .5 INJECTION, POWDER, FOR SOLUTION INTRAVENOUS at 08:17

## 2024-10-15 RX ADMIN — SODIUM CHLORIDE, PRESERVATIVE FREE 10 ML: 5 INJECTION INTRAVENOUS at 08:15

## 2024-10-15 RX ADMIN — PIPERACILLIN AND TAZOBACTAM 3375 MG: 3; .375 INJECTION, POWDER, LYOPHILIZED, FOR SOLUTION INTRAVENOUS at 22:32

## 2024-10-15 RX ADMIN — GABAPENTIN 600 MG: 300 CAPSULE ORAL at 10:15

## 2024-10-15 RX ADMIN — PIPERACILLIN AND TAZOBACTAM 3375 MG: 3; .375 INJECTION, POWDER, LYOPHILIZED, FOR SOLUTION INTRAVENOUS at 13:32

## 2024-10-15 RX ADMIN — FLUOXETINE HYDROCHLORIDE 20 MG: 20 CAPSULE ORAL at 16:06

## 2024-10-15 RX ADMIN — SODIUM CHLORIDE, PRESERVATIVE FREE 10 ML: 5 INJECTION INTRAVENOUS at 21:57

## 2024-10-15 RX ADMIN — HYDROMORPHONE HYDROCHLORIDE 0.5 MG: 1 INJECTION, SOLUTION INTRAMUSCULAR; INTRAVENOUS; SUBCUTANEOUS at 16:06

## 2024-10-15 RX ADMIN — GABAPENTIN 600 MG: 300 CAPSULE ORAL at 16:06

## 2024-10-15 RX ADMIN — GABAPENTIN 600 MG: 300 CAPSULE ORAL at 21:56

## 2024-10-15 RX ADMIN — IOPAMIDOL 50 ML: 612 INJECTION, SOLUTION INTRAVENOUS at 11:30

## 2024-10-15 RX ADMIN — HYDROMORPHONE HYDROCHLORIDE 0.5 MG: 1 INJECTION, SOLUTION INTRAMUSCULAR; INTRAVENOUS; SUBCUTANEOUS at 02:24

## 2024-10-15 RX ADMIN — PANTOPRAZOLE SODIUM 40 MG: 40 TABLET, DELAYED RELEASE ORAL at 16:06

## 2024-10-15 RX ADMIN — ENOXAPARIN SODIUM 40 MG: 100 INJECTION SUBCUTANEOUS at 08:14

## 2024-10-15 ASSESSMENT — PAIN DESCRIPTION - DESCRIPTORS
DESCRIPTORS: CRAMPING;ACHING
DESCRIPTORS: ACHING

## 2024-10-15 ASSESSMENT — PAIN DESCRIPTION - LOCATION
LOCATION: ABDOMEN

## 2024-10-15 ASSESSMENT — PAIN SCALES - GENERAL
PAINLEVEL_OUTOF10: 8
PAINLEVEL_OUTOF10: 2
PAINLEVEL_OUTOF10: 0
PAINLEVEL_OUTOF10: 8
PAINLEVEL_OUTOF10: 8
PAINLEVEL_OUTOF10: 5

## 2024-10-15 NOTE — ED TRIAGE NOTES
Patient to the ER with complaints of abdominal pain that started around 1530 today.   Around that same time he began with emesis.     Patient took gas-x with no relief.  He says he feels a \"band of pain\" around his lower abdomen that starts to get really intense and then he vomits.      Patient says he has not been able to have a BM or pass gas since 0700 this morning.     Hx of gastric bypass surgery, hernia repairs, multiple abdominal surgeries, and bowel obstructions.     Alert and oriented x4 and ambulatory with a steady gait at time of triage.   
17-Aug-2020 11:06

## 2024-10-15 NOTE — ED NOTES
Pt still with no bed assignment,  transfer center and  MD  made aware. Waiting for updated from transfer  center.  .

## 2024-10-15 NOTE — ED NOTES
Pt requesting some pain medication and nausea medication. MD made aware, repeat EKG completed  and meds administered per MAR., Pt tolerated well .

## 2024-10-15 NOTE — ED PROVIDER NOTES
[facility]  EMERGENCY DEPARTMENT ENCOUNTER        Pt Name: Davis Payton  MRN: [id number]  Birthdate 1970  Date of evaluation: 10/14/2024  Provider: Robbie Bermudez MD  PCP: David Lopez MD  Note Started: 5:19 AM EDT 10/15/24    CHIEF COMPLAINT       Chief Complaint   Patient presents with    Abdominal Pain    Emesis       HISTORY OF PRESENT ILLNESS: 1 or more Elements   History From: Patient        Davis Payton is a 54 y.o. male who presents for evaluation of abdominal pain and nausea and vomiting.  Patient reports he has an extensive surgical history including previous gastric bypass, multiple bowel obstructions, previous laparotomy, abdominal wall abscess.  Patient reports that a few hours prior to arrival he began having discomfort in his lower abdomen that he describes as a bandlike sensation around the umbilicus.  Reports he began having episodes of abdominal pain as well as nausea and vomiting.  States his emesis is nonbloody nonbilious and is mostly retching.  Reports having normal bowel movements is still passing flatus.  He denies fevers.  Denies changes in urine function.  He has tried taking over the medications at improvement of symptoms.     Nursing Notes were all reviewed and agreed with or any disagreements were addressed in the HPI.    REVIEW OF SYSTEMS :      Review of Systems    Positives and Pertinent negatives as per HPI.     SURGICAL HISTORY     Past Surgical History:   Procedure Laterality Date    ABDOMEN SURGERY      gastric bypass    ABDOMEN SURGERY  04/07/2016    repair of recurrent incisional hernia with mesh, removal of old mesh, bilateral component separation    ABDOMEN SURGERY Right 7/25/2024    INCISION AND DRAINAGE RIGHT LOWER QUADRANT ABDOMINAL WALL ABSCESS performed by Kurtis Middleton MD at [facility] OR    ABDOMINAL EXPLORATION SURGERY  01/11/2016    exp lap, lysis of adhesions, small bowel resection    BACK SURGERY  2022

## 2024-10-15 NOTE — CONSULTS
PATIENT NAME: Davis Payton   YOB: 1970    ADMISSION DATE: 10/14/2024  9:21 PM      TODAY'S DATE: 10/15/2024    CHIEF COMPLAINT:  abdominal pain      HISTORY OF PRESENT ILLNESS:  The patient is a 54 y.o. male  who presents with three days of cramping abdominal pain and nausea. No vomiting reported. Previous gastric bypass and multiple episodes of SBO. Most have resolved spontaneously. Seen in ER last night with non contrast CT worrisome for SBO with mesenteric edema and twisting. Admitted with plan for repeat CT with PO contrast which is now pending. States his pain has improved somewhat and he has been passing flatus. Has been on Plavix which we will now hold for possible exploration.    CT scan imaging was independently reviewed by me today    Past Medical History:        Diagnosis Date    Alcoholism (HCC)     last drink 2015    Allergic rhinitis, seasonal     Anemia     Arthritis     right knee    Vaughn's esophagus     Benign intracranial hypertension     Cancer (HCC)     left kidney    Carpal tunnel syndrome     Chronic low back pain     Chronic pain syndrome     Depression     Ear infection 05/06/2022    GERD (gastroesophageal reflux disease)     Headache(784.0)     History of blood transfusion     2018    History of loop recorder 2022    placed in 2021 and removed in 2022 d/t infection    History of spinal fracture 2021    L1,L7,L8 fall T5,T8  - unknown 2022    History of tobacco use     Quit 7/2014    Hyperlipidemia     Left wrist fracture 2020    fall    Lumbar compression fracture (HCC)     Migraine     chronic for 1 year    Morbid obesity     Movement disorder     Onychomycosis     Osteoporosis     Sleep apnea, obstructive     Severe uses cpap stop bang 6    Unspecified cerebral artery occlusion with cerebral infarction 2014    slight weakness in left arm    Wears dentures     full set    Wears glasses        Past Surgical History:        Procedure Laterality Date    ABDOMEN SURGERY

## 2024-10-15 NOTE — ED NOTES
Pt resting in bed, requesting more pain med at this time. MD meds administered per MAR .   VS updated, please see flow sheets for full set. No other needs voiced at this time.

## 2024-10-15 NOTE — ED NOTES
Report called to Leela THORNTON on 4W.  Transportation arranged,  [facility] scheduled to pick pt up at 0515. Pt also updated on POC  and he is agreeable.

## 2024-10-15 NOTE — H&P
HISTORY AND PHYSICAL             Date: 10/15/2024        Patient Name: Davis Payton     YOB: 1970      Age:  54 y.o.    Chief Complaint     Chief Complaint   Patient presents with    Abdominal Pain    Emesis          History Obtained From   patient    History of Present Illness   54m presents to the ER with complaint of abdominal pain, nausea, dry heaves. He states onset was 4 pm Saturday afternoon. He has had worsening pain, denies passing flatus, fever, chills, diarrhea and states pain similar to prior bouts of sbo. Currently patient is seen on room air in no distress.     Past Medical History     Past Medical History:   Diagnosis Date    Alcoholism (HCC)     last drink 2015    Allergic rhinitis, seasonal     Anemia     Arthritis     right knee    Vaughn's esophagus     Benign intracranial hypertension     Cancer (HCC)     left kidney    Carpal tunnel syndrome     Chronic low back pain     Chronic pain syndrome     Depression     Ear infection 05/06/2022    GERD (gastroesophageal reflux disease)     Headache(784.0)     History of blood transfusion     2018    History of loop recorder 2022    placed in 2021 and removed in 2022 d/t infection    History of spinal fracture 2021    L1,L7,L8 fall T5,T8  - unknown 2022    History of tobacco use     Quit 7/2014    Hyperlipidemia     Left wrist fracture 2020    fall    Lumbar compression fracture (HCC)     Migraine     chronic for 1 year    Morbid obesity     Movement disorder     Onychomycosis     Osteoporosis     Sleep apnea, obstructive     Severe uses cpap stop bang 6    Unspecified cerebral artery occlusion with cerebral infarction 2014    slight weakness in left arm    Wears dentures     full set    Wears glasses         Past Surgical History     Past Surgical History:   Procedure Laterality Date    ABDOMEN SURGERY      gastric bypass    ABDOMEN SURGERY  04/07/2016    repair of recurrent incisional hernia with mesh, removal of old mesh, bilateral

## 2024-10-15 NOTE — PLAN OF CARE
Problem: Pain  Goal: Verbalizes/displays adequate comfort level or baseline comfort level  Outcome: Progressing     Problem: Chronic Conditions and Co-morbidities  Goal: Patient's chronic conditions and co-morbidity symptoms are monitored and maintained or improved  Outcome: Progressing  Flowsheets (Taken 10/15/2024 0549 by Leela Maravilla, RN)  Care Plan - Patient's Chronic Conditions and Co-Morbidity Symptoms are Monitored and Maintained or Improved:   Monitor and assess patient's chronic conditions and comorbid symptoms for stability, deterioration, or improvement   Collaborate with multidisciplinary team to address chronic and comorbid conditions and prevent exacerbation or deterioration   Update acute care plan with appropriate goals if chronic or comorbid symptoms are exacerbated and prevent overall improvement and discharge

## 2024-10-15 NOTE — ED NOTES
Transport team at bed bedside. Report given to  medic who will be transporting pt.  Pt loaded on the stretcher and  transported to [facility] via ambulance .

## 2024-10-16 ENCOUNTER — APPOINTMENT (OUTPATIENT)
Dept: GENERAL RADIOLOGY | Age: 54
DRG: 390 | End: 2024-10-16
Payer: COMMERCIAL

## 2024-10-16 LAB
ALBUMIN SERPL-MCNC: 3.4 G/DL (ref 3.4–5)
ALP SERPL-CCNC: 81 U/L (ref 40–129)
ALT SERPL-CCNC: 8 U/L (ref 10–40)
ANION GAP SERPL CALCULATED.3IONS-SCNC: 7 MMOL/L (ref 3–16)
AST SERPL-CCNC: 13 U/L (ref 15–37)
BASOPHILS # BLD: 0.1 K/UL (ref 0–0.2)
BASOPHILS NFR BLD: 1.3 %
BILIRUB DIRECT SERPL-MCNC: 0.3 MG/DL (ref 0–0.3)
BILIRUB INDIRECT SERPL-MCNC: 0.2 MG/DL (ref 0–1)
BILIRUB SERPL-MCNC: 0.5 MG/DL (ref 0–1)
BUN SERPL-MCNC: 8 MG/DL (ref 7–20)
CALCIUM SERPL-MCNC: 8.6 MG/DL (ref 8.3–10.6)
CHLORIDE SERPL-SCNC: 108 MMOL/L (ref 99–110)
CO2 SERPL-SCNC: 27 MMOL/L (ref 21–32)
CREAT SERPL-MCNC: 1.1 MG/DL (ref 0.9–1.3)
DEPRECATED RDW RBC AUTO: 17 % (ref 12.4–15.4)
EOSINOPHIL # BLD: 0.3 K/UL (ref 0–0.6)
EOSINOPHIL NFR BLD: 3.5 %
GFR SERPLBLD CREATININE-BSD FMLA CKD-EPI: 80 ML/MIN/{1.73_M2}
GLUCOSE SERPL-MCNC: 77 MG/DL (ref 70–99)
HCT VFR BLD AUTO: 35.9 % (ref 40.5–52.5)
HGB BLD-MCNC: 12.1 G/DL (ref 13.5–17.5)
LACTATE BLDV-SCNC: 0.8 MMOL/L (ref 0.4–2)
LYMPHOCYTES # BLD: 4.2 K/UL (ref 1–5.1)
LYMPHOCYTES NFR BLD: 48.6 %
MCH RBC QN AUTO: 30.1 PG (ref 26–34)
MCHC RBC AUTO-ENTMCNC: 33.7 G/DL (ref 31–36)
MCV RBC AUTO: 89.4 FL (ref 80–100)
MONOCYTES # BLD: 0.6 K/UL (ref 0–1.3)
MONOCYTES NFR BLD: 6.8 %
NEUTROPHILS # BLD: 3.4 K/UL (ref 1.7–7.7)
NEUTROPHILS NFR BLD: 39.8 %
PLATELET # BLD AUTO: 262 K/UL (ref 135–450)
PMV BLD AUTO: 8.9 FL (ref 5–10.5)
POTASSIUM SERPL-SCNC: 4.1 MMOL/L (ref 3.5–5.1)
PROT SERPL-MCNC: 5.1 G/DL (ref 6.4–8.2)
RBC # BLD AUTO: 4.02 M/UL (ref 4.2–5.9)
SODIUM SERPL-SCNC: 142 MMOL/L (ref 136–145)
WBC # BLD AUTO: 8.6 K/UL (ref 4–11)

## 2024-10-16 PROCEDURE — 83605 ASSAY OF LACTIC ACID: CPT

## 2024-10-16 PROCEDURE — 6370000000 HC RX 637 (ALT 250 FOR IP): Performed by: INTERNAL MEDICINE

## 2024-10-16 PROCEDURE — APPSS15 APP SPLIT SHARED TIME 0-15 MINUTES: Performed by: PHYSICIAN ASSISTANT

## 2024-10-16 PROCEDURE — 6360000002 HC RX W HCPCS: Performed by: HOSPITALIST

## 2024-10-16 PROCEDURE — 2580000003 HC RX 258: Performed by: HOSPITALIST

## 2024-10-16 PROCEDURE — 85025 COMPLETE CBC W/AUTO DIFF WBC: CPT

## 2024-10-16 PROCEDURE — 2580000003 HC RX 258: Performed by: SURGERY

## 2024-10-16 PROCEDURE — 6360000002 HC RX W HCPCS: Performed by: INTERNAL MEDICINE

## 2024-10-16 PROCEDURE — 80076 HEPATIC FUNCTION PANEL: CPT

## 2024-10-16 PROCEDURE — 2580000003 HC RX 258: Performed by: INTERNAL MEDICINE

## 2024-10-16 PROCEDURE — 6360000002 HC RX W HCPCS: Performed by: SURGERY

## 2024-10-16 PROCEDURE — APPNB15 APP NON BILLABLE TIME 0-15 MINS: Performed by: PHYSICIAN ASSISTANT

## 2024-10-16 PROCEDURE — 1200000000 HC SEMI PRIVATE

## 2024-10-16 PROCEDURE — 74019 RADEX ABDOMEN 2 VIEWS: CPT

## 2024-10-16 PROCEDURE — 36415 COLL VENOUS BLD VENIPUNCTURE: CPT

## 2024-10-16 PROCEDURE — 80048 BASIC METABOLIC PNL TOTAL CA: CPT

## 2024-10-16 PROCEDURE — 6370000000 HC RX 637 (ALT 250 FOR IP): Performed by: HOSPITALIST

## 2024-10-16 RX ORDER — SODIUM CHLORIDE 9 MG/ML
INJECTION, SOLUTION INTRAVENOUS CONTINUOUS
Status: DISCONTINUED | OUTPATIENT
Start: 2024-10-16 | End: 2024-10-17 | Stop reason: HOSPADM

## 2024-10-16 RX ORDER — 0.9 % SODIUM CHLORIDE 0.9 %
500 INTRAVENOUS SOLUTION INTRAVENOUS ONCE
Status: COMPLETED | OUTPATIENT
Start: 2024-10-16 | End: 2024-10-16

## 2024-10-16 RX ORDER — KETOROLAC TROMETHAMINE 30 MG/ML
30 INJECTION, SOLUTION INTRAMUSCULAR; INTRAVENOUS EVERY 6 HOURS PRN
Status: DISCONTINUED | OUTPATIENT
Start: 2024-10-16 | End: 2024-10-17 | Stop reason: HOSPADM

## 2024-10-16 RX ADMIN — GABAPENTIN 600 MG: 300 CAPSULE ORAL at 08:44

## 2024-10-16 RX ADMIN — PIPERACILLIN AND TAZOBACTAM 3375 MG: 3; .375 INJECTION, POWDER, LYOPHILIZED, FOR SOLUTION INTRAVENOUS at 06:50

## 2024-10-16 RX ADMIN — ENOXAPARIN SODIUM 40 MG: 100 INJECTION SUBCUTANEOUS at 08:44

## 2024-10-16 RX ADMIN — HYDROMORPHONE HYDROCHLORIDE 0.5 MG: 1 INJECTION, SOLUTION INTRAMUSCULAR; INTRAVENOUS; SUBCUTANEOUS at 20:17

## 2024-10-16 RX ADMIN — SODIUM CHLORIDE, PRESERVATIVE FREE 10 ML: 5 INJECTION INTRAVENOUS at 20:18

## 2024-10-16 RX ADMIN — GABAPENTIN 600 MG: 300 CAPSULE ORAL at 20:16

## 2024-10-16 RX ADMIN — KETOROLAC TROMETHAMINE 30 MG: 30 INJECTION, SOLUTION INTRAMUSCULAR at 06:38

## 2024-10-16 RX ADMIN — PANTOPRAZOLE SODIUM 40 MG: 40 TABLET, DELAYED RELEASE ORAL at 06:37

## 2024-10-16 RX ADMIN — GABAPENTIN 600 MG: 300 CAPSULE ORAL at 13:09

## 2024-10-16 RX ADMIN — GABAPENTIN 600 MG: 300 CAPSULE ORAL at 17:24

## 2024-10-16 RX ADMIN — HYDROMORPHONE HYDROCHLORIDE 0.5 MG: 1 INJECTION, SOLUTION INTRAMUSCULAR; INTRAVENOUS; SUBCUTANEOUS at 10:32

## 2024-10-16 RX ADMIN — FLUOXETINE HYDROCHLORIDE 20 MG: 20 CAPSULE ORAL at 08:44

## 2024-10-16 RX ADMIN — PIPERACILLIN AND TAZOBACTAM 3375 MG: 3; .375 INJECTION, POWDER, LYOPHILIZED, FOR SOLUTION INTRAVENOUS at 21:50

## 2024-10-16 RX ADMIN — SODIUM CHLORIDE 500 ML: 9 INJECTION, SOLUTION INTRAVENOUS at 06:44

## 2024-10-16 RX ADMIN — ATORVASTATIN CALCIUM 40 MG: 40 TABLET, FILM COATED ORAL at 20:16

## 2024-10-16 RX ADMIN — PIPERACILLIN AND TAZOBACTAM 3375 MG: 3; .375 INJECTION, POWDER, LYOPHILIZED, FOR SOLUTION INTRAVENOUS at 13:06

## 2024-10-16 RX ADMIN — HYDROMORPHONE HYDROCHLORIDE 0.5 MG: 1 INJECTION, SOLUTION INTRAMUSCULAR; INTRAVENOUS; SUBCUTANEOUS at 14:52

## 2024-10-16 RX ADMIN — PANTOPRAZOLE SODIUM 40 MG: 40 TABLET, DELAYED RELEASE ORAL at 17:24

## 2024-10-16 RX ADMIN — SODIUM CHLORIDE: 9 INJECTION, SOLUTION INTRAVENOUS at 11:49

## 2024-10-16 ASSESSMENT — PAIN DESCRIPTION - DESCRIPTORS
DESCRIPTORS: ACHING;DISCOMFORT
DESCRIPTORS: ACHING
DESCRIPTORS: ACHING
DESCRIPTORS: ACHING;DISCOMFORT;SPASM
DESCRIPTORS: ACHING;DISCOMFORT

## 2024-10-16 ASSESSMENT — PAIN DESCRIPTION - ONSET: ONSET: ON-GOING

## 2024-10-16 ASSESSMENT — PAIN SCALES - GENERAL
PAINLEVEL_OUTOF10: 6
PAINLEVEL_OUTOF10: 7
PAINLEVEL_OUTOF10: 8
PAINLEVEL_OUTOF10: 0

## 2024-10-16 ASSESSMENT — PAIN DESCRIPTION - LOCATION
LOCATION: ABDOMEN

## 2024-10-16 ASSESSMENT — PAIN - FUNCTIONAL ASSESSMENT
PAIN_FUNCTIONAL_ASSESSMENT: ACTIVITIES ARE NOT PREVENTED

## 2024-10-16 ASSESSMENT — PAIN DESCRIPTION - ORIENTATION
ORIENTATION: RIGHT;LEFT
ORIENTATION: RIGHT;LEFT
ORIENTATION: MID

## 2024-10-16 ASSESSMENT — PAIN DESCRIPTION - FREQUENCY: FREQUENCY: CONTINUOUS

## 2024-10-16 ASSESSMENT — PAIN DESCRIPTION - PAIN TYPE: TYPE: ACUTE PAIN

## 2024-10-17 VITALS
BODY MASS INDEX: 25.45 KG/M2 | SYSTOLIC BLOOD PRESSURE: 106 MMHG | HEIGHT: 71 IN | RESPIRATION RATE: 16 BRPM | HEART RATE: 87 BPM | OXYGEN SATURATION: 94 % | WEIGHT: 181.8 LBS | DIASTOLIC BLOOD PRESSURE: 68 MMHG | TEMPERATURE: 97.9 F

## 2024-10-17 LAB
ANION GAP SERPL CALCULATED.3IONS-SCNC: 7 MMOL/L (ref 3–16)
BUN SERPL-MCNC: 7 MG/DL (ref 7–20)
CALCIUM SERPL-MCNC: 8.5 MG/DL (ref 8.3–10.6)
CHLORIDE SERPL-SCNC: 109 MMOL/L (ref 99–110)
CO2 SERPL-SCNC: 26 MMOL/L (ref 21–32)
CREAT SERPL-MCNC: 1.1 MG/DL (ref 0.9–1.3)
GFR SERPLBLD CREATININE-BSD FMLA CKD-EPI: 80 ML/MIN/{1.73_M2}
GLUCOSE SERPL-MCNC: 81 MG/DL (ref 70–99)
POTASSIUM SERPL-SCNC: 4 MMOL/L (ref 3.5–5.1)
SODIUM SERPL-SCNC: 142 MMOL/L (ref 136–145)

## 2024-10-17 PROCEDURE — 2580000003 HC RX 258: Performed by: SURGERY

## 2024-10-17 PROCEDURE — 36415 COLL VENOUS BLD VENIPUNCTURE: CPT

## 2024-10-17 PROCEDURE — APPNB15 APP NON BILLABLE TIME 0-15 MINS: Performed by: PHYSICIAN ASSISTANT

## 2024-10-17 PROCEDURE — 94760 N-INVAS EAR/PLS OXIMETRY 1: CPT

## 2024-10-17 PROCEDURE — 80048 BASIC METABOLIC PNL TOTAL CA: CPT

## 2024-10-17 PROCEDURE — 6370000000 HC RX 637 (ALT 250 FOR IP): Performed by: HOSPITALIST

## 2024-10-17 PROCEDURE — APPSS15 APP SPLIT SHARED TIME 0-15 MINUTES: Performed by: PHYSICIAN ASSISTANT

## 2024-10-17 PROCEDURE — 6360000002 HC RX W HCPCS: Performed by: SURGERY

## 2024-10-17 PROCEDURE — 6370000000 HC RX 637 (ALT 250 FOR IP): Performed by: INTERNAL MEDICINE

## 2024-10-17 PROCEDURE — 6360000002 HC RX W HCPCS: Performed by: HOSPITALIST

## 2024-10-17 RX ORDER — TRAMADOL HYDROCHLORIDE 50 MG/1
50 TABLET ORAL EVERY 4 HOURS PRN
Qty: 18 TABLET | Refills: 0 | Status: SHIPPED | OUTPATIENT
Start: 2024-10-17 | End: 2024-10-20

## 2024-10-17 RX ADMIN — PIPERACILLIN AND TAZOBACTAM 3375 MG: 3; .375 INJECTION, POWDER, LYOPHILIZED, FOR SOLUTION INTRAVENOUS at 05:55

## 2024-10-17 RX ADMIN — GABAPENTIN 600 MG: 300 CAPSULE ORAL at 08:13

## 2024-10-17 RX ADMIN — TRAZODONE HYDROCHLORIDE 200 MG: 100 TABLET ORAL at 00:56

## 2024-10-17 RX ADMIN — HYDROMORPHONE HYDROCHLORIDE 0.5 MG: 1 INJECTION, SOLUTION INTRAMUSCULAR; INTRAVENOUS; SUBCUTANEOUS at 02:41

## 2024-10-17 RX ADMIN — FLUOXETINE HYDROCHLORIDE 20 MG: 20 CAPSULE ORAL at 08:13

## 2024-10-17 RX ADMIN — ENOXAPARIN SODIUM 40 MG: 100 INJECTION SUBCUTANEOUS at 08:13

## 2024-10-17 RX ADMIN — PANTOPRAZOLE SODIUM 40 MG: 40 TABLET, DELAYED RELEASE ORAL at 05:48

## 2024-10-17 ASSESSMENT — PAIN DESCRIPTION - LOCATION
LOCATION: ABDOMEN
LOCATION: ABDOMEN

## 2024-10-17 ASSESSMENT — PAIN DESCRIPTION - DESCRIPTORS
DESCRIPTORS: ACHING;DISCOMFORT
DESCRIPTORS: ACHING;DISCOMFORT

## 2024-10-17 ASSESSMENT — PAIN DESCRIPTION - ORIENTATION
ORIENTATION: RIGHT;LEFT;MID
ORIENTATION: RIGHT;LEFT;MID

## 2024-10-17 ASSESSMENT — PAIN DESCRIPTION - FREQUENCY: FREQUENCY: CONTINUOUS

## 2024-10-17 ASSESSMENT — PAIN SCALES - GENERAL
PAINLEVEL_OUTOF10: 7
PAINLEVEL_OUTOF10: 0
PAINLEVEL_OUTOF10: 0
PAINLEVEL_OUTOF10: 7

## 2024-10-17 ASSESSMENT — PAIN DESCRIPTION - PAIN TYPE: TYPE: ACUTE PAIN

## 2024-10-17 ASSESSMENT — PAIN DESCRIPTION - ONSET: ONSET: ON-GOING

## 2024-10-17 NOTE — CARE COORDINATION
Case Management Discharge Note          Date / Time of Note: 10/17/2024 9:26 AM                  Patient Name: Davis Payton   YOB: 1970  Diagnosis: Small bowel obstruction (HCC) [K56.609]  Generalized abdominal pain [R10.84]  Nausea and vomiting, unspecified vomiting type [R11.2]   Date / Time: 10/14/2024  9:21 PM    Financial:  Payor: [employer] [facility] / Plan: [employer] [facility] [employer] - [place] PPO / Product Type: *No Product type* /      Pharmacy:    [id number] [place], [place] - [address] - P [phone] F [phone] [address]  [facility] [address]  Phone: [phone] Fax: [phone]    [facility] #[id number] [place], [place] - [address] - P [phone] - F [phone]  [address]  [facility] [address]  Phone: [phone] Fax: [phone]    MAIN PHARMACY  [facility], [address] [address]      [id number] [place] [facility] [address] - P [phone] - F [phone]  [address]  [place] [facility] [address]  Phone: [phone] Fax: [phone]      Assistance purchasing medications?: Potential Assistance Purchasing Medications: No (Has meds filled at [facility] on [address])  Assistance provided by Case Management: None at this time    DISCHARGE Disposition: Home- No Services Needed        Transportation:  Transportation PLAN for discharge: family   Mode of Transport: Private Car      IMM Completed:   Not Indicated    Additional CM Notes: Home via family.  No d/c needs noted at this time.     The Plan for Transition of Care is related to the following treatment goals of Small bowel obstruction (HCC) [K56.609]  Generalized abdominal pain [R10.84]  Nausea and vomiting, unspecified vomiting type [R11.2]        KAMARI OlsonOH [facility]   Case Management Department  Ph: [phone]  Fax: [phone]  Electronically signed by KAMARI Olson on 10/17/2024 at 9:27 AM      
Transport Family  (Spouse or mother will transport pt home at d/c.)

## 2024-10-17 NOTE — PLAN OF CARE
Problem: Chronic Conditions and Co-morbidities  Goal: Patient's chronic conditions and co-morbidity symptoms are monitored and maintained or improved  10/17/2024 1130 by Tonja Shelton RN  Outcome: Completed  10/17/2024 0148 by Boris Jackson RN  Outcome: Progressing  Flowsheets (Taken 10/16/2024 2004)  Care Plan - Patient's Chronic Conditions and Co-Morbidity Symptoms are Monitored and Maintained or Improved:   Monitor and assess patient's chronic conditions and comorbid symptoms for stability, deterioration, or improvement   Collaborate with multidisciplinary team to address chronic and comorbid conditions and prevent exacerbation or deterioration   Update acute care plan with appropriate goals if chronic or comorbid symptoms are exacerbated and prevent overall improvement and discharge     Problem: Discharge Planning  Goal: Discharge to home or other facility with appropriate resources  10/17/2024 1130 by Tonja Shelton RN  Outcome: Completed  10/17/2024 0148 by Boris Jackson RN  Outcome: Progressing  Flowsheets (Taken 10/16/2024 2004)  Discharge to home or other facility with appropriate resources:   Identify barriers to discharge with patient and caregiver   Arrange for needed discharge resources and transportation as appropriate   Identify discharge learning needs (meds, wound care, etc)     Problem: Pain  Goal: Verbalizes/displays adequate comfort level or baseline comfort level  10/17/2024 1130 by Tonja Shelton RN  Outcome: Completed  10/17/2024 0148 by Boris Jackson RN  Outcome: Progressing  Flowsheets (Taken 10/16/2024 2004)  Verbalizes/displays adequate comfort level or baseline comfort level:   Encourage patient to monitor pain and request assistance   Assess pain using appropriate pain scale   Administer analgesics based on type and severity of pain and evaluate response   Implement non-pharmacological measures as appropriate and evaluate response   Consider cultural and social

## 2024-10-17 NOTE — PROGRESS NOTES
General and Vascular Surgery                                                           Daily Progress Note                                                             Armani Morgan PA-C    Pt Name: Davis Payton  Medical Record Number: [id number]  Date of Birth 1970   Today's Date: 10/17/2024    ASSESSMENT/PLAN  SBO  Abdominal xray's done yesterday showed contrast throughout the colon.  Nonobstructive bowel gas pattern   WBC count WNL  Feeling good today  Denies any nausea or emesis  Admits to having flatulence and BM's  Tolerating regular diet  OOB and ambulate  OK to D/C home. F/U PRN    EDUCATION  Patient educated about their illness/diagnosis, stated above, and all questions answered. We discussed the importance of nutrition, medications they are taking, and healthy lifestyle.  SUBJECTIVE  Davis has improved from yesterday. Pain is well controlled. He has no nausea and no vomiting.  He has passed flatus and has had a bowel movement. He is tolerating regular diet. Current activity is ad agustina    OBJECTIVE  VITALS:  height is 1.803 m (5' 11\") and weight is 82.5 kg (181 lb 12.8 oz). His oral temperature is 97.9 °F (36.6 °C). His blood pressure is 106/68 and his pulse is 87. His respiration is 16 and oxygen saturation is 94%. VITALS:  /68   Pulse 87   Temp 97.9 °F (36.6 °C) (Oral)   Resp 16   Ht 1.803 m (5' 11\")   Wt 82.5 kg (181 lb 12.8 oz)   SpO2 94%   BMI 25.36 kg/m²   GENERAL: alert, no distress  LUNGS: clear to ausculation, without wheezes, rales or rhonci  HEART: RRR  ABDOMEN: soft, tenderness improved, non-distended  EXTREMITY: no cyanosis, clubbing or edema  I/O last 3 completed shifts:  In: 630 [P.O.:630]  Out: 3350 [Urine:3350]  No intake/output data recorded.    LABS  Recent Labs     10/14/24  2140 10/16/24  0602 10/17/24  0744   WBC 9.3 8.6  --    HGB 12.9* 12.1*  --    HCT 38.1* 35.9*  --     262  --     142 142 
      Hospitalist Progress Note      PCP: David Lopez MD    Date of Admission: 10/14/2024    Subjective: tolerating clears, wants to be able to go home tomorrow    Medications:  Reviewed    Infusion Medications    sodium chloride 75 mL/hr at 10/16/24 1149    sodium chloride       Scheduled Medications    gabapentin  600 mg Oral 4x Daily    sodium chloride flush  5-40 mL IntraVENous 2 times per day    enoxaparin  40 mg SubCUTAneous Daily    piperacillin-tazobactam  3,375 mg IntraVENous Q8H    atorvastatin  40 mg Oral Nightly    [Held by provider] celecoxib  200 mg Oral Daily    FLUoxetine  20 mg Oral Daily    pantoprazole  40 mg Oral BID AC     PRN Meds: ketorolac, HYDROmorphone, sodium chloride flush, sodium chloride, potassium chloride **OR** potassium alternative oral replacement **OR** potassium chloride, magnesium sulfate, ondansetron **OR** ondansetron, polyethylene glycol, acetaminophen **OR** acetaminophen, traZODone      Intake/Output Summary (Last 24 hours) at 10/16/2024 2028  Last data filed at 10/16/2024 1848  Gross per 24 hour   Intake 150 ml   Output 1700 ml   Net -1550 ml       Physical Exam Performed:    /73   Pulse 52   Temp 98.2 °F (36.8 °C) (Oral)   Resp 18   Ht 1.803 m (5' 11\")   Wt 80.9 kg (178 lb 5.6 oz)   SpO2 94%   BMI 24.88 kg/m²     General: Awake, oriented  HEENT: Pupils round, reacting to light  Heart: S1 S2 heard, no murmurs  Lung: Clear b/l  Abd: Soft, not distended, not tender, BS hypo  Extr: No cyanosis or clubbing  Neuro: No focal deficits.      Labs:   Recent Labs     10/14/24  2140 10/16/24  0602   WBC 9.3 8.6   HGB 12.9* 12.1*   HCT 38.1* 35.9*    262     Recent Labs     10/14/24  2140 10/16/24  0602    142   K 4.3 4.1    108   CO2 23 27   BUN 9 8   CREATININE 0.8* 1.1   CALCIUM 9.2 8.6     Recent Labs     10/14/24  2140 10/16/24  0602   AST 15 13*   ALT 9* 8*   BILIDIR  --  0.3   BILITOT 0.3 0.5   ALKPHOS 106 81     No results for 
 Provider Dr Stacy Arroyo notified of low BP. 94/61 P 52 Per Provider administer 500 ml bolus and Toradol for pain. Pt updated on POC .  
4 Eyes Skin Assessment     NAME:  Davis Payton  YOB: 1970  MEDICAL RECORD NUMBER:  [id number]    The patient is being assessed for  Admission    I agree that at least one RN has performed a thorough Head to Toe Skin Assessment on the patient. ALL assessment sites listed below have been assessed.      Areas assessed by both nurses:    Head, Face, Ears, Shoulders, Back, Chest, Arms, Elbows, Hands, Sacrum. Buttock, Coccyx, Ischium, Legs. Feet and Heels, and Under Medical Devices         Does the Patient have a Wound? No noted wound(s)       Andrei Prevention initiated by RN: No  Wound Care Orders initiated by RN: No    Pressure Injury (Stage 3,4, Unstageable, DTI, NWPT, and Complex wounds) if present, place Wound referral order by RN under : No    New Ostomies, if present place, Ostomy referral order under : No     Nurse 1 eSignature: Electronically signed by Leela Gant RN on 10/15/24 at 5:51 AM EDT    **SHARE this note so that the co-signing nurse can place an eSignature**    Nurse 2 eSignature: Electronically signed by Eleanor Valdovinos RN on 10/15/24 at 6:10 AM EDT   
CLINICAL PHARMACY NOTE: MEDS TO BEDS    Total # of Prescriptions Filled: 1   The following medications were delivered to the patient:  Current Discharge Medication List        START taking these medications    Details   traMADol (ULTRAM) 50 MG tablet Take 1 tablet by mouth every 4 hours as needed for Pain for up to 3 days. Intended supply: 3 days. Take lowest dose possible to manage pain Max Daily Amount: 300 mg  Qty: 18 tablet, Refills: 0    Comments: Reduce doses taken as pain becomes manageable  Associated Diagnoses: SBO (small bowel obstruction) ([facility])               Additional Documentation:  Medications delivered at bedside  
Discharge instructions given. Verbalizes understanding. Pt received prescription from retail pharmacy. Pt then ambulated to [facility] in stable condition.   
Dr Middleton at bedside.   
Patient arrived to unit from [place] ED, placed into room  4117 d/t a small bowel obstruction. Patient alert and oriented x4. Patient oriented to room, call light. Head to toe assessment completed. All patient needs met at this time, fall precautions in place. Plan of care ongoing.      Electronically signed by Leela Gant RN on 10/15/2024 at 5:51 AM   
Pt admitted this am, seen/examined today  NPO for now, gen surgery consulted  CT abd reviewed, await return of bowel fn  
Pt continues to have abdominal discomfort. Was able to eat turkey sandwiches but has requested pain medication through the night. Continues to receive antibiotics .   
Removed IV with catheter intact. Pt tolerated well.  
35.9*    262    142   K 4.3 4.1    108   CO2 23 27   BUN 9 8   CREATININE 0.8* 1.1   CALCIUM 9.2 8.6   AST 15 13*   ALT 9* 8*   BILITOT 0.3 0.5   BILIDIR  --  0.3   NITRU Negative  --    COLORU Yellow  --    CBC:   Lab Results   Component Value Date/Time    WBC 8.6 10/16/2024 06:02 AM    RBC 4.02 10/16/2024 06:02 AM    RBC 4.94 01/07/2015 10:47 AM    HGB 12.1 10/16/2024 06:02 AM    HCT 35.9 10/16/2024 06:02 AM    MCV 89.4 10/16/2024 06:02 AM    MCH 30.1 10/16/2024 06:02 AM    MCHC 33.7 10/16/2024 06:02 AM    RDW 17.0 10/16/2024 06:02 AM     10/16/2024 06:02 AM    MPV 8.9 10/16/2024 06:02 AM     CMP:    Lab Results   Component Value Date/Time     10/16/2024 06:02 AM    K 4.1 10/16/2024 06:02 AM     10/16/2024 06:02 AM    CO2 27 10/16/2024 06:02 AM    BUN 8 10/16/2024 06:02 AM    CREATININE 1.1 10/16/2024 06:02 AM    GFRAA >60 09/26/2022 07:27 PM    AGRATIO 2.0 10/14/2024 09:40 PM    LABGLOM 80 10/16/2024 06:02 AM    LABGLOM >90 04/13/2024 05:07 AM    GLUCOSE 77 10/16/2024 06:02 AM    GLUCOSE 84 01/07/2015 10:47 AM    CALCIUM 8.6 10/16/2024 06:02 AM    BILITOT 0.5 10/16/2024 06:02 AM    ALKPHOS 81 10/16/2024 06:02 AM    AST 13 10/16/2024 06:02 AM    ALT 8 10/16/2024 06:02 AM         Armani Morgan PA-C   Electronically signed 10/16/2024 at 12:18 PM    As above,  Feeling better today, minimal nausea  Advancing diet  Home in AM if stable    Electronically signed by HELEN REYES MD on 10/16/2024 at 6:24 PM

## 2024-10-17 NOTE — PLAN OF CARE
Problem: Chronic Conditions and Co-morbidities  Goal: Patient's chronic conditions and co-morbidity symptoms are monitored and maintained or improved  Outcome: Progressing  Flowsheets (Taken 10/16/2024 2004)  Care Plan - Patient's Chronic Conditions and Co-Morbidity Symptoms are Monitored and Maintained or Improved:   Monitor and assess patient's chronic conditions and comorbid symptoms for stability, deterioration, or improvement   Collaborate with multidisciplinary team to address chronic and comorbid conditions and prevent exacerbation or deterioration   Update acute care plan with appropriate goals if chronic or comorbid symptoms are exacerbated and prevent overall improvement and discharge     Problem: Discharge Planning  Goal: Discharge to home or other facility with appropriate resources  Outcome: Progressing  Flowsheets (Taken 10/16/2024 2004)  Discharge to home or other facility with appropriate resources:   Identify barriers to discharge with patient and caregiver   Arrange for needed discharge resources and transportation as appropriate   Identify discharge learning needs (meds, wound care, etc)     Problem: Pain  Goal: Verbalizes/displays adequate comfort level or baseline comfort level  Outcome: Progressing  Flowsheets (Taken 10/16/2024 2004)  Verbalizes/displays adequate comfort level or baseline comfort level:   Encourage patient to monitor pain and request assistance   Assess pain using appropriate pain scale   Administer analgesics based on type and severity of pain and evaluate response   Implement non-pharmacological measures as appropriate and evaluate response   Consider cultural and social influences on pain and pain management     Problem: Safety - Adult  Goal: Free from fall injury  Outcome: Progressing

## 2024-10-18 ENCOUNTER — CARE COORDINATION (OUTPATIENT)
Dept: CASE MANAGEMENT | Age: 54
End: 2024-10-18

## 2024-10-18 NOTE — CARE COORDINATION
Care Transitions Note    Initial Call - Call within 2 business days of discharge: Yes    Patient Current Location:  [place]    Care Transition Nurse contacted the patient by telephone to perform post hospital discharge assessment, verified name and  as identifiers. Provided introduction to self, and explanation of the Care Transition Nurse role.     Patient: Davis Payton    Patient : 1970   MRN: [id number]    Reason for Admission: N/V  Discharge Date: 10/17/24  RURS: Readmission Risk Score: 18.4      Last Discharge Facility       Date Complaint Diagnosis Description Type Department Provider    10/14/24 Abdominal Pain; Emesis Nausea and vomiting, unspecified vomiting type ... ED to Hosp-Admission (Discharged) (ADMITTED) [facility] Karolina Christensen MD; EFFIE Bermudez..            Was this an external facility discharge? No    Additional needs identified to be addressed with provider   No needs identified             Method of communication with provider: none.      Care Summary Note: spoke briefly with Davis. He states he is \"fine.\" He declines participating in a CT program - completing the CT discharge phone call - or any additional CT outreach.          Follow Up Appointment:   Currently there is no HFU appt scheduled with the PCP. Writer will send a message to the office pool to facilitate.  Future Appointments         Provider Specialty Dept Phone    2024 10:20 AM David Lopez MD Family Medicine [phone]            Patient closed (declined further KARLA services) from the Care Transitions program on 10/18/2024.    Handoff:   Patient was not referred to the ACM team due to patient declined services.      Patient has agreed to contact primary care provider and/or specialist for any further questions, concerns, or needs.    Magaly Garcia RN BSN  Care Transition Nurse  [phone]

## 2024-10-21 ENCOUNTER — TELEPHONE (OUTPATIENT)
Dept: FAMILY MEDICINE CLINIC | Age: 54
End: 2024-10-21

## 2024-10-25 ENCOUNTER — OFFICE VISIT (OUTPATIENT)
Dept: FAMILY MEDICINE CLINIC | Age: 54
End: 2024-10-25

## 2024-10-25 VITALS
RESPIRATION RATE: 18 BRPM | HEART RATE: 54 BPM | SYSTOLIC BLOOD PRESSURE: 124 MMHG | WEIGHT: 185 LBS | OXYGEN SATURATION: 98 % | DIASTOLIC BLOOD PRESSURE: 80 MMHG | BODY MASS INDEX: 25.8 KG/M2

## 2024-10-25 DIAGNOSIS — Z86.73 HISTORY OF STROKE: ICD-10-CM

## 2024-10-25 DIAGNOSIS — G89.4 CHRONIC PAIN SYNDROME: Chronic | ICD-10-CM

## 2024-10-25 DIAGNOSIS — K56.609 SBO (SMALL BOWEL OBSTRUCTION) (HCC): ICD-10-CM

## 2024-10-25 DIAGNOSIS — Z09 HOSPITAL DISCHARGE FOLLOW-UP: Primary | ICD-10-CM

## 2024-10-25 NOTE — PROGRESS NOTES
tablet TAKE 2 TABLETS BY MOUTH EVERY NIGHT 180 tablet 1    sildenafil (REVATIO) 20 MG tablet Take 4 tablets by mouth as needed (30 min prior to intercourse) 30 tablet 5    gabapentin (NEURONTIN) 600 MG tablet Take 1 tablet by mouth 4 times daily.      Multiple Vitamin TABS Take 1 tablet by mouth daily           Medications patient taking as of now reconciled against medications ordered at time of hospital discharge: Yes    Review of Systems As above     Objective:    /80 (Site: Right Upper Arm, Position: Sitting, Cuff Size: Medium Adult)   Pulse 54   Resp 18   Wt 83.9 kg (185 lb)   SpO2 98%   BMI 25.80 kg/m²   Physical Exam  Vitals and nursing note reviewed.   Constitutional:       General: He is not in acute distress.     Appearance: Normal appearance. He is well-developed. He is not diaphoretic.   HENT:      Head: Normocephalic and atraumatic.      Right Ear: Tympanic membrane, ear canal and external ear normal.      Left Ear: Tympanic membrane, ear canal and external ear normal.      Nose: Nose normal. No congestion or rhinorrhea.      Mouth/Throat:      Mouth: Mucous membranes are moist.      Pharynx: Oropharynx is clear. No oropharyngeal exudate or posterior oropharyngeal erythema.   Eyes:      General: No scleral icterus.        Right eye: No discharge.         Left eye: No discharge.      Conjunctiva/sclera: Conjunctivae normal.      Pupils: Pupils are equal, round, and reactive to light.   Cardiovascular:      Rate and Rhythm: Normal rate and regular rhythm.      Heart sounds: Normal heart sounds. No murmur heard.  Pulmonary:      Effort: Pulmonary effort is normal. No respiratory distress.      Breath sounds: Normal breath sounds. No wheezing or rales.   Abdominal:      General: Bowel sounds are normal. There is no distension.      Palpations: Abdomen is soft.      Tenderness: There is no abdominal tenderness. There is no guarding or rebound.      Hernia: No hernia is present.   Musculoskeletal:

## 2024-10-31 PROBLEM — R11.2 INTRACTABLE NAUSEA AND VOMITING: Status: RESOLVED | Noted: 2022-03-22 | Resolved: 2024-10-31

## 2024-10-31 PROBLEM — K56.609 SBO (SMALL BOWEL OBSTRUCTION) (HCC): Status: RESOLVED | Noted: 2024-10-15 | Resolved: 2024-10-31

## 2024-11-07 DIAGNOSIS — M47.819 ARTHRITIS OF SPINE: ICD-10-CM

## 2024-11-07 RX ORDER — CELECOXIB 200 MG/1
CAPSULE ORAL DAILY
Qty: 30 CAPSULE | Refills: 1 | Status: SHIPPED | OUTPATIENT
Start: 2024-11-07

## 2024-11-21 RX ORDER — PANTOPRAZOLE SODIUM 40 MG/1
40 TABLET, DELAYED RELEASE ORAL 2 TIMES DAILY
Qty: 180 TABLET | Refills: 1 | Status: SHIPPED | OUTPATIENT
Start: 2024-11-21

## 2024-11-21 RX ORDER — TRAZODONE HYDROCHLORIDE 100 MG/1
TABLET ORAL
Qty: 180 TABLET | Refills: 1 | Status: SHIPPED | OUTPATIENT
Start: 2024-11-21

## 2024-11-22 RX ORDER — ATORVASTATIN CALCIUM 40 MG/1
TABLET, FILM COATED ORAL
Qty: 90 TABLET | Refills: 1 | Status: SHIPPED | OUTPATIENT
Start: 2024-11-22

## 2024-12-09 RX ORDER — SILDENAFIL CITRATE 20 MG/1
TABLET ORAL
Qty: 30 TABLET | Refills: 5 | Status: SHIPPED | OUTPATIENT
Start: 2024-12-09

## 2024-12-17 ENCOUNTER — PATIENT MESSAGE (OUTPATIENT)
Dept: FAMILY MEDICINE CLINIC | Age: 54
End: 2024-12-17

## 2024-12-17 DIAGNOSIS — M47.819 ARTHRITIS OF SPINE: ICD-10-CM

## 2024-12-17 RX ORDER — CELECOXIB 200 MG/1
200 CAPSULE ORAL DAILY
Qty: 90 CAPSULE | Refills: 1 | Status: SHIPPED | OUTPATIENT
Start: 2024-12-17

## 2024-12-17 RX ORDER — CELECOXIB 200 MG/1
200 CAPSULE ORAL DAILY
Qty: 30 CAPSULE | Refills: 1 | Status: SHIPPED | OUTPATIENT
Start: 2024-12-17 | End: 2024-12-17 | Stop reason: SDUPTHER

## 2024-12-17 RX ORDER — CELECOXIB 200 MG/1
200 CAPSULE ORAL DAILY
Qty: 90 CAPSULE | Refills: 1 | Status: SHIPPED | OUTPATIENT
Start: 2024-12-17 | End: 2024-12-17 | Stop reason: SDUPTHER

## 2024-12-28 ENCOUNTER — APPOINTMENT (OUTPATIENT)
Dept: CT IMAGING | Age: 54
End: 2024-12-28
Payer: COMMERCIAL

## 2024-12-28 ENCOUNTER — HOSPITAL ENCOUNTER (EMERGENCY)
Age: 54
Discharge: HOME | End: 2024-12-28
Attending: EMERGENCY MEDICINE
Payer: COMMERCIAL

## 2024-12-28 VITALS
DIASTOLIC BLOOD PRESSURE: 70 MMHG | RESPIRATION RATE: 17 BRPM | SYSTOLIC BLOOD PRESSURE: 122 MMHG | OXYGEN SATURATION: 97 % | TEMPERATURE: 98.3 F | WEIGHT: 192.02 LBS | HEART RATE: 67 BPM | BODY MASS INDEX: 26.78 KG/M2

## 2024-12-28 DIAGNOSIS — S22.42XA FRACTURE OF RIBS, TWO, LEFT, CLOSED, INITIAL ENCOUNTER: Primary | ICD-10-CM

## 2024-12-28 PROCEDURE — 99284 EMERGENCY DEPT VISIT MOD MDM: CPT

## 2024-12-28 PROCEDURE — 71250 CT THORAX DX C-: CPT

## 2024-12-28 PROCEDURE — 6370000000 HC RX 637 (ALT 250 FOR IP): Performed by: PHYSICIAN ASSISTANT

## 2024-12-28 PROCEDURE — 72128 CT CHEST SPINE W/O DYE: CPT

## 2024-12-28 RX ORDER — OXYCODONE HYDROCHLORIDE 5 MG/1
5 TABLET ORAL ONCE
Status: COMPLETED | OUTPATIENT
Start: 2024-12-28 | End: 2024-12-28

## 2024-12-28 RX ORDER — LIDOCAINE 4 G/G
1 PATCH TOPICAL DAILY
Status: DISCONTINUED | OUTPATIENT
Start: 2024-12-28 | End: 2024-12-28 | Stop reason: HOSPADM

## 2024-12-28 RX ORDER — OXYCODONE AND ACETAMINOPHEN 5; 325 MG/1; MG/1
1 TABLET ORAL EVERY 6 HOURS PRN
Qty: 12 TABLET | Refills: 0 | Status: SHIPPED | OUTPATIENT
Start: 2024-12-28 | End: 2024-12-31

## 2024-12-28 RX ORDER — LIDOCAINE 50 MG/G
1 PATCH TOPICAL DAILY
Qty: 30 PATCH | Refills: 0 | Status: SHIPPED | OUTPATIENT
Start: 2024-12-28 | End: 2025-01-27

## 2024-12-28 RX ADMIN — OXYCODONE 5 MG: 5 TABLET ORAL at 18:57

## 2024-12-28 ASSESSMENT — PAIN SCALES - GENERAL
PAINLEVEL_OUTOF10: 9
PAINLEVEL_OUTOF10: 3
PAINLEVEL_OUTOF10: 2

## 2024-12-28 ASSESSMENT — PAIN DESCRIPTION - LOCATION
LOCATION: RIB CAGE
LOCATION: RIB CAGE

## 2024-12-28 ASSESSMENT — PAIN - FUNCTIONAL ASSESSMENT: PAIN_FUNCTIONAL_ASSESSMENT: 0-10

## 2024-12-28 NOTE — ED PROVIDER NOTES
records reviewed. Diagnostic testing reviewed and results discussed.      I have independently evaluated this patient based upon my scope of practice. Supervising physician was in the department for consultation as needed.     This is a very pleasant 54-year-old male who presents for evaluation of left-sided rib injury after a fall 1 week ago.  Patient seen and evaluated by myself history obtained from patient.  On exam he is alert oriented appropriately vital signs are normal.  He appears well-hydrated nontoxic, he has no evidence of head or neck trauma.  He has no external sign of trauma on physical exam no bruising no abrasions no bony step-off or deformity.  He does have tenderness to palpation along the left sided mid and inferior rib cage, he also has some mild midline thoracic tenderness without associated neuromuscular deficit or bony step-off or deformity.  Patient notes that he has a past medical history of osteopenia prior L1 fracture with kyphoplasty.  Patient was given lidocaine patch in the department and p.o. oxycodone, on reassessment he notes improvement in his pain.  Patient had CT imaging of the chest without contrast and CT thoracic spine without contrast.  At time of signout patient is comfortable, he is awaiting radiology interpretation of his images.    Disposition Considerations (include 1 Tests not done, Shared Decision Making, Pt Expectation of Test or Tx.): 8:16 PM: I discussed the history, physical, and treatment plan with Dr. Segal. Davis Payton was signed out in stable condition. Please see Dr. Segal 's note for further details, including diagnosis and disposition.          I am the Primary Clinician of Record.    FINAL IMPRESSION      1. Contusion of rib, left, initial encounter          DISPOSITION/PLAN     DISPOSITION                 PATIENT REFERRED TO:  No follow-up provider specified.    DISCHARGE MEDICATIONS:  New Prescriptions    No medications on file        DISCONTINUED MEDICATIONS:  Discontinued Medications    No medications on file              (Please note that portions of this note were completed with a voice recognition program.  Efforts were made to edit the dictations but occasionally words are mis-transcribed.)    JEMAL Escobar (electronically signed)            Katrin Diaz PA  12/28/24 2017

## 2024-12-29 NOTE — DISCHARGE INSTRUCTIONS
Do not take Motrin (ibuprofen) or Naprosyn (naproxen) for pain because this will slow the healing of broken bones.

## 2024-12-29 NOTE — ED PROVIDER NOTES
[facility]  EMERGENCY DEPARTMENT ENCOUNTER      Pt Name: Davis Payton  MRN: [id number]  Birthdate 1970  Date of evaluation: 12/28/2024  Provider: MARÍA FIGUEROA DO    CHIEF COMPLAINT  Chief Complaint   Patient presents with    Rib Injury     States he tripped and fell onto a speaker Saturday.  C/o left rib pain which is getting worse       I have fully participated in the care of Davis Payton and have had a face-to-face evaluation. I have reviewed and agree with all pertinent clinical information, and midlevel provider's history, and physical exam. I have also reviewed the imaging studies and treatment plan. I have also reviewed and agree with the medications, allergies and past medical history section for this Davis Payton. I agree with the diagnosis, and I concur.    I personally saw the patient and made/approved the management plan and take responsibility for the patient management.      This patient is at risk for a communicable infection.  Therefore, personal protection equipment consisting of a mask was worn for the exam.    Past Medical History:   Diagnosis Date    Alcoholism (HCC)     last drink 2015    Allergic rhinitis, seasonal     Anemia     Arthritis     right knee    Vaughn's esophagus     Benign intracranial hypertension     Cancer (HCC)     left kidney    Carpal tunnel syndrome     Chronic low back pain     Chronic pain syndrome     Depression     Ear infection 05/06/2022    GERD (gastroesophageal reflux disease)     Headache(784.0)     History of blood transfusion     2018    History of loop recorder 2022    placed in 2021 and removed in 2022 d/t infection    History of spinal fracture 2021    L1,L7,L8 fall T5,T8  - unknown 2022    History of tobacco use     Quit 7/2014    Hyperlipidemia     Left wrist fracture 2020    fall    Lumbar compression fracture (HCC)     Migraine     chronic for 1 year    Morbid obesity     Movement disorder

## 2025-01-16 ENCOUNTER — OFFICE VISIT (OUTPATIENT)
Dept: SURGERY | Age: 55
End: 2025-01-16

## 2025-01-16 VITALS — HEIGHT: 71 IN | BODY MASS INDEX: 26.88 KG/M2 | WEIGHT: 192 LBS

## 2025-01-16 DIAGNOSIS — L02.211 ABSCESS OF ABDOMINAL WALL: Primary | ICD-10-CM

## 2025-01-16 PROCEDURE — 99024 POSTOP FOLLOW-UP VISIT: CPT | Performed by: SURGERY

## 2025-01-16 RX ORDER — CLOPIDOGREL BISULFATE 75 MG/1
75 TABLET ORAL DAILY
Qty: 90 TABLET | Refills: 1 | Status: SHIPPED | OUTPATIENT
Start: 2025-01-16

## 2025-01-16 NOTE — PROGRESS NOTES
Davis Payton (:  1970) is a 54 y.o. male,Established patient, here for evaluation of the following chief complaint(s):  Post-Op Check (Wound check)         Assessment & Plan  Abscess of abdominal wall     Daily dressing changes    Follow up in 2 weeks                Subjective   HPI  Abscess in RLQ recurred. Seen in ER out of town for I&D and antibiotics last week. Dressing changed today. Area is clean and granulating. Daily dressing change and follow up in 2-3 weeks.    Review of Systems       Objective   Physical Exam       Electronically signed by HELEN REYES MD on 2025 at 10:37 AM        An electronic signature was used to authenticate this note.    --HELEN REYES MD

## 2025-01-20 NOTE — PROGRESS NOTES
Patient admitted to PACU # 8 from OR at [address] [place] [address] per Dr Lexi Ahuja. Attached to PACU monitoring system and report received from anesthesia provider. Patient was reported to be hemodynamically stable during procedure. Patient with oral airway in place. Will monitor. Left arm; Right arm;

## 2025-01-30 ENCOUNTER — OFFICE VISIT (OUTPATIENT)
Dept: SURGERY | Age: 55
End: 2025-01-30

## 2025-01-30 VITALS — HEIGHT: 71 IN | WEIGHT: 192 LBS | BODY MASS INDEX: 26.88 KG/M2

## 2025-01-30 DIAGNOSIS — L02.211 ABSCESS OF ABDOMINAL WALL: Primary | ICD-10-CM

## 2025-01-30 PROCEDURE — NBSRV NON-BILLABLE SERVICE: Performed by: SURGERY

## 2025-01-30 NOTE — PROGRESS NOTES
Davis Payton (:  1970) is a 54 y.o. male,Established patient, here for evaluation of the following chief complaint(s):  Post-Op Check (Abdominal wall site check)         Assessment & Plan  Abscess of abdominal wall            Follow up with me as needed         Subjective   HPI  Doing better. Wound has closed, no drainage. Follow up with me as needed    Review of Systems       Objective   Physical Exam       Electronically signed by HELEN REYES MD on 2025 at 10:13 AM        An electronic signature was used to authenticate this note.    --HELEN REYES MD

## 2025-02-05 ENCOUNTER — OFFICE VISIT (OUTPATIENT)
Age: 55
End: 2025-02-05

## 2025-02-05 VITALS
SYSTOLIC BLOOD PRESSURE: 120 MMHG | HEART RATE: 58 BPM | DIASTOLIC BLOOD PRESSURE: 78 MMHG | WEIGHT: 188 LBS | OXYGEN SATURATION: 97 % | TEMPERATURE: 98.1 F | HEIGHT: 71 IN | BODY MASS INDEX: 26.32 KG/M2

## 2025-02-05 DIAGNOSIS — B00.9 HSV-1 INFECTION: Primary | ICD-10-CM

## 2025-02-05 RX ORDER — PREDNISONE 20 MG/1
20 TABLET ORAL DAILY
Qty: 5 TABLET | Refills: 0 | Status: SHIPPED | OUTPATIENT
Start: 2025-02-05 | End: 2025-02-10

## 2025-02-05 RX ORDER — ACYCLOVIR 800 MG/1
800 TABLET ORAL 2 TIMES DAILY
Qty: 20 TABLET | Refills: 0 | Status: SHIPPED | OUTPATIENT
Start: 2025-02-05 | End: 2025-02-15

## 2025-02-05 RX ORDER — CEPHALEXIN 500 MG/1
500 CAPSULE ORAL 2 TIMES DAILY
Qty: 20 CAPSULE | Refills: 0 | Status: SHIPPED | OUTPATIENT
Start: 2025-02-05 | End: 2025-02-15

## 2025-02-05 NOTE — PROGRESS NOTES
Davis Payton (:  1970) is a 54 y.o. male,Established patient, here for evaluation of the following chief complaint(s):  OTHER (Lips chapped , corners of the mouth cracked and causing pain , soreness in the mouth x 3 days )      ASSESSMENT/PLAN:    ICD-10-CM    1. HSV-1 infection  B00.9 acyclovir (ZOVIRAX) 800 MG tablet     cephALEXin (KEFLEX) 500 MG capsule     predniSONE (DELTASONE) 20 MG tablet          Dx Disposition: HSV stomatitis  Education and handout provided on diagnosis and management of symptoms.   AVS reviewed with patient. Follow up as needed in UC or with PCP for new or worsening symptoms.   Return if symptoms worsen or fail to improve.    SUBJECTIVE/OBJECTIVE:  Patient presents today with complaints of cracked corners of mouth and sores on inside of mouth that are painful that started 3 days ago      History provided by:  Patient   used: No        Vitals:    25 1502   BP: 120/78   Site: Right Upper Arm   Position: Sitting   Cuff Size: Medium Adult   Pulse: 58   Temp: 98.1 °F (36.7 °C)   TempSrc: Oral   SpO2: 97%   Weight: 85.3 kg (188 lb)   Height: 1.803 m (5' 11\")       Review of Systems    Physical Exam  Constitutional:       Appearance: Normal appearance.   HENT:      Nose: Nose normal.      Mouth/Throat:      Mouth: Mucous membranes are moist.      Pharynx: Oropharynx is clear.      Comments: HSV noted to both sides of tongue and mouth  Cardiovascular:      Rate and Rhythm: Normal rate and regular rhythm.      Heart sounds: Normal heart sounds.   Pulmonary:      Effort: Pulmonary effort is normal.      Breath sounds: Normal breath sounds.   Musculoskeletal:         General: Normal range of motion.      Cervical back: Normal range of motion and neck supple.   Skin:     General: Skin is warm and dry.   Neurological:      Mental Status: He is alert and oriented to person, place, and time.   Psychiatric:         Mood and Affect: Mood normal.         Behavior:  (4) walks frequently

## 2025-02-05 NOTE — PATIENT INSTRUCTIONS
Thank you for allowing us to care for you today and we hope you feel better soon  New Prescriptions    ACYCLOVIR (ZOVIRAX) 800 MG TABLET    Take 1 tablet by mouth 2 times daily for 10 days    CEPHALEXIN (KEFLEX) 500 MG CAPSULE    Take 1 capsule by mouth 2 times daily for 10 days    PREDNISONE (DELTASONE) 20 MG TABLET    Take 1 tablet by mouth daily for 5 days

## 2025-02-24 ENCOUNTER — OFFICE VISIT (OUTPATIENT)
Dept: FAMILY MEDICINE CLINIC | Age: 55
End: 2025-02-24

## 2025-02-24 VITALS
SYSTOLIC BLOOD PRESSURE: 120 MMHG | HEART RATE: 69 BPM | HEIGHT: 71 IN | OXYGEN SATURATION: 98 % | RESPIRATION RATE: 16 BRPM | BODY MASS INDEX: 25.54 KG/M2 | WEIGHT: 182.4 LBS | DIASTOLIC BLOOD PRESSURE: 74 MMHG

## 2025-02-24 DIAGNOSIS — H61.22 IMPACTED CERUMEN OF LEFT EAR: Primary | ICD-10-CM

## 2025-02-24 DIAGNOSIS — H91.92 DECREASED HEARING, LEFT: ICD-10-CM

## 2025-02-24 ASSESSMENT — PATIENT HEALTH QUESTIONNAIRE - PHQ9
SUM OF ALL RESPONSES TO PHQ9 QUESTIONS 1 & 2: 0
SUM OF ALL RESPONSES TO PHQ QUESTIONS 1-9: 0
4. FEELING TIRED OR HAVING LITTLE ENERGY: NOT AT ALL
8. MOVING OR SPEAKING SO SLOWLY THAT OTHER PEOPLE COULD HAVE NOTICED. OR THE OPPOSITE, BEING SO FIGETY OR RESTLESS THAT YOU HAVE BEEN MOVING AROUND A LOT MORE THAN USUAL: NOT AT ALL
SUM OF ALL RESPONSES TO PHQ QUESTIONS 1-9: 0
9. THOUGHTS THAT YOU WOULD BE BETTER OFF DEAD, OR OF HURTING YOURSELF: NOT AT ALL
2. FEELING DOWN, DEPRESSED OR HOPELESS: NOT AT ALL
6. FEELING BAD ABOUT YOURSELF - OR THAT YOU ARE A FAILURE OR HAVE LET YOURSELF OR YOUR FAMILY DOWN: NOT AT ALL
7. TROUBLE CONCENTRATING ON THINGS, SUCH AS READING THE NEWSPAPER OR WATCHING TELEVISION: NOT AT ALL
5. POOR APPETITE OR OVEREATING: NOT AT ALL
3. TROUBLE FALLING OR STAYING ASLEEP: NOT AT ALL
1. LITTLE INTEREST OR PLEASURE IN DOING THINGS: NOT AT ALL
10. IF YOU CHECKED OFF ANY PROBLEMS, HOW DIFFICULT HAVE THESE PROBLEMS MADE IT FOR YOU TO DO YOUR WORK, TAKE CARE OF THINGS AT HOME, OR GET ALONG WITH OTHER PEOPLE: NOT DIFFICULT AT ALL

## 2025-02-24 NOTE — PROGRESS NOTES
2025    Blood pressure 120/74, pulse 69, resp. rate 16, height 1.803 m (5' 11\"), weight 82.7 kg (182 lb 6.4 oz), SpO2 98%.    Davis Payton (:  1970) is a 54 y.o. male, here for evaluation of the following medical concerns:    Chief Complaint   Patient presents with    Ear Fullness     L ear fullness and hearing issues      Here for concern of ear problems  Onset was 'a while ago'  off and on. Maybe   Ear feels full, sometimes achy.  Can be decreased hearing unilateral  Earbuds don't seat well on that side (Left)    Has no other ENT sx.   No fever/chills  No new headaches.    No treatment  No ENT meds     Smokes tobacco and medical MJ daily for pain.    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    Chronic pain syndrome- abdominal and head    History of stroke: right insular cortex on 2014 (small PFO closed by Dr Majano; hypergoag w/u neg,  neurology)    Gastroesophageal reflux disease with esophagitis--on daily ppi    Intractable chronic migraine without aura and with status migrainosus    Iron deficiency anemia    B12 deficiency    History of renal cell cancer- 2018, DR Napoles, clear cell, left    History of alcoholism (HCC)    History of total knee arthroplasty, right    History of DVT of lower extremity , bilat calves    Osteopenia of multiple sites    Current smoker    Pain syndrome, chronic    Exocrine pancreatic insufficiency    Hx of gastric bypass    Moderate episode of recurrent major depressive disorder (HCC)    History of DVT (deep vein thrombosis)- , bilat calves    Chronic knee pain after total replacement of

## 2025-03-11 RX ORDER — PANTOPRAZOLE SODIUM 40 MG/1
40 TABLET, DELAYED RELEASE ORAL 2 TIMES DAILY
Qty: 180 TABLET | Refills: 1 | Status: SHIPPED | OUTPATIENT
Start: 2025-03-11

## 2025-03-11 RX ORDER — TRAZODONE HYDROCHLORIDE 100 MG/1
200 TABLET ORAL NIGHTLY
Qty: 180 TABLET | Refills: 1 | Status: SHIPPED | OUTPATIENT
Start: 2025-03-11

## 2025-04-05 ENCOUNTER — HOSPITAL ENCOUNTER (INPATIENT)
Age: 55
LOS: 3 days | Discharge: HOME | DRG: 394 | End: 2025-04-09
Attending: HOSPITALIST | Admitting: STUDENT IN AN ORGANIZED HEALTH CARE EDUCATION/TRAINING PROGRAM
Payer: COMMERCIAL

## 2025-04-05 ENCOUNTER — APPOINTMENT (OUTPATIENT)
Dept: CT IMAGING | Age: 55
DRG: 394 | End: 2025-04-05
Payer: COMMERCIAL

## 2025-04-05 DIAGNOSIS — R11.2 NAUSEA AND VOMITING, UNSPECIFIED VOMITING TYPE: ICD-10-CM

## 2025-04-05 DIAGNOSIS — R10.33 PERIUMBILICAL ABDOMINAL PAIN: Primary | ICD-10-CM

## 2025-04-05 PROBLEM — R10.9 ABDOMINAL PAIN: Status: ACTIVE | Noted: 2025-04-05

## 2025-04-05 LAB
ANION GAP SERPL CALCULATED.3IONS-SCNC: 8 MMOL/L (ref 3–16)
BASOPHILS # BLD: 0.1 K/UL (ref 0–0.2)
BASOPHILS NFR BLD: 1.2 %
BUN SERPL-MCNC: 11 MG/DL (ref 7–20)
CALCIUM SERPL-MCNC: 8.5 MG/DL (ref 8.3–10.6)
CHLORIDE SERPL-SCNC: 105 MMOL/L (ref 99–110)
CO2 SERPL-SCNC: 25 MMOL/L (ref 21–32)
CREAT SERPL-MCNC: 1 MG/DL (ref 0.9–1.3)
DEPRECATED RDW RBC AUTO: 17.9 % (ref 12.4–15.4)
EOSINOPHIL # BLD: 0.3 K/UL (ref 0–0.6)
EOSINOPHIL NFR BLD: 4.2 %
GFR SERPLBLD CREATININE-BSD FMLA CKD-EPI: 89 ML/MIN/{1.73_M2}
GLUCOSE SERPL-MCNC: 82 MG/DL (ref 70–99)
HCT VFR BLD AUTO: 35.2 % (ref 40.5–52.5)
HGB BLD-MCNC: 11.6 G/DL (ref 13.5–17.5)
LIPASE SERPL-CCNC: 32 U/L (ref 13–60)
LYMPHOCYTES # BLD: 2.2 K/UL (ref 1–5.1)
LYMPHOCYTES NFR BLD: 33.4 %
MCH RBC QN AUTO: 28.9 PG (ref 26–34)
MCHC RBC AUTO-ENTMCNC: 32.8 G/DL (ref 31–36)
MCV RBC AUTO: 88 FL (ref 80–100)
MONOCYTES # BLD: 0.5 K/UL (ref 0–1.3)
MONOCYTES NFR BLD: 7.2 %
NEUTROPHILS # BLD: 3.5 K/UL (ref 1.7–7.7)
NEUTROPHILS NFR BLD: 54 %
PLATELET # BLD AUTO: 311 K/UL (ref 135–450)
PMV BLD AUTO: 8.6 FL (ref 5–10.5)
POTASSIUM SERPL-SCNC: 4.1 MMOL/L (ref 3.5–5.1)
RBC # BLD AUTO: 4 M/UL (ref 4.2–5.9)
SODIUM SERPL-SCNC: 138 MMOL/L (ref 136–145)
WBC # BLD AUTO: 6.6 K/UL (ref 4–11)

## 2025-04-05 PROCEDURE — 2500000003 HC RX 250 WO HCPCS: Performed by: HOSPITALIST

## 2025-04-05 PROCEDURE — 6370000000 HC RX 637 (ALT 250 FOR IP): Performed by: HOSPITALIST

## 2025-04-05 PROCEDURE — 96375 TX/PRO/DX INJ NEW DRUG ADDON: CPT

## 2025-04-05 PROCEDURE — 99285 EMERGENCY DEPT VISIT HI MDM: CPT

## 2025-04-05 PROCEDURE — 74177 CT ABD & PELVIS W/CONTRAST: CPT

## 2025-04-05 PROCEDURE — G0378 HOSPITAL OBSERVATION PER HR: HCPCS

## 2025-04-05 PROCEDURE — 83690 ASSAY OF LIPASE: CPT

## 2025-04-05 PROCEDURE — 96376 TX/PRO/DX INJ SAME DRUG ADON: CPT

## 2025-04-05 PROCEDURE — 6360000002 HC RX W HCPCS: Performed by: PHYSICIAN ASSISTANT

## 2025-04-05 PROCEDURE — 85025 COMPLETE CBC W/AUTO DIFF WBC: CPT

## 2025-04-05 PROCEDURE — 80048 BASIC METABOLIC PNL TOTAL CA: CPT

## 2025-04-05 PROCEDURE — 96374 THER/PROPH/DIAG INJ IV PUSH: CPT

## 2025-04-05 PROCEDURE — 6360000004 HC RX CONTRAST MEDICATION: Performed by: PHYSICIAN ASSISTANT

## 2025-04-05 PROCEDURE — 6360000002 HC RX W HCPCS

## 2025-04-05 PROCEDURE — 6360000002 HC RX W HCPCS: Performed by: HOSPITALIST

## 2025-04-05 RX ORDER — MORPHINE SULFATE 2 MG/ML
2 INJECTION, SOLUTION INTRAMUSCULAR; INTRAVENOUS ONCE
Refills: 0 | Status: COMPLETED | OUTPATIENT
Start: 2025-04-05 | End: 2025-04-05

## 2025-04-05 RX ORDER — SODIUM CHLORIDE 0.9 % (FLUSH) 0.9 %
5-40 SYRINGE (ML) INJECTION EVERY 12 HOURS SCHEDULED
Status: DISCONTINUED | OUTPATIENT
Start: 2025-04-05 | End: 2025-04-09 | Stop reason: HOSPADM

## 2025-04-05 RX ORDER — GABAPENTIN 300 MG/1
600 CAPSULE ORAL 4 TIMES DAILY
Status: DISCONTINUED | OUTPATIENT
Start: 2025-04-05 | End: 2025-04-09 | Stop reason: HOSPADM

## 2025-04-05 RX ORDER — IOPAMIDOL 755 MG/ML
75 INJECTION, SOLUTION INTRAVASCULAR
Status: COMPLETED | OUTPATIENT
Start: 2025-04-05 | End: 2025-04-05

## 2025-04-05 RX ORDER — CELECOXIB 200 MG/1
200 CAPSULE ORAL DAILY
Status: DISCONTINUED | OUTPATIENT
Start: 2025-04-06 | End: 2025-04-05

## 2025-04-05 RX ORDER — ACETAMINOPHEN 650 MG/1
650 SUPPOSITORY RECTAL EVERY 6 HOURS PRN
Status: DISCONTINUED | OUTPATIENT
Start: 2025-04-05 | End: 2025-04-09 | Stop reason: HOSPADM

## 2025-04-05 RX ORDER — ONDANSETRON 4 MG/1
4 TABLET, ORALLY DISINTEGRATING ORAL ONCE
Status: CANCELLED | OUTPATIENT
Start: 2025-04-05 | End: 2025-04-05

## 2025-04-05 RX ORDER — ENOXAPARIN SODIUM 100 MG/ML
40 INJECTION SUBCUTANEOUS NIGHTLY
Status: DISCONTINUED | OUTPATIENT
Start: 2025-04-05 | End: 2025-04-09 | Stop reason: HOSPADM

## 2025-04-05 RX ORDER — ONDANSETRON 2 MG/ML
4 INJECTION INTRAMUSCULAR; INTRAVENOUS EVERY 6 HOURS PRN
Status: DISCONTINUED | OUTPATIENT
Start: 2025-04-05 | End: 2025-04-09 | Stop reason: HOSPADM

## 2025-04-05 RX ORDER — TRAZODONE HYDROCHLORIDE 100 MG/1
200 TABLET ORAL NIGHTLY
Status: DISCONTINUED | OUTPATIENT
Start: 2025-04-05 | End: 2025-04-09 | Stop reason: HOSPADM

## 2025-04-05 RX ORDER — BISACODYL 5 MG/1
10 TABLET, DELAYED RELEASE ORAL DAILY PRN
Status: CANCELLED | OUTPATIENT
Start: 2025-04-05

## 2025-04-05 RX ORDER — KETOROLAC TROMETHAMINE 15 MG/ML
15 INJECTION, SOLUTION INTRAMUSCULAR; INTRAVENOUS EVERY 6 HOURS PRN
Status: DISCONTINUED | OUTPATIENT
Start: 2025-04-05 | End: 2025-04-09 | Stop reason: HOSPADM

## 2025-04-05 RX ORDER — ACETAMINOPHEN 325 MG/1
650 TABLET ORAL EVERY 6 HOURS PRN
Status: DISCONTINUED | OUTPATIENT
Start: 2025-04-05 | End: 2025-04-09 | Stop reason: HOSPADM

## 2025-04-05 RX ORDER — SODIUM CHLORIDE 9 MG/ML
INJECTION, SOLUTION INTRAVENOUS PRN
Status: DISCONTINUED | OUTPATIENT
Start: 2025-04-05 | End: 2025-04-09 | Stop reason: HOSPADM

## 2025-04-05 RX ORDER — OXYCODONE AND ACETAMINOPHEN 5; 325 MG/1; MG/1
1 TABLET ORAL EVERY 4 HOURS PRN
Status: DISCONTINUED | OUTPATIENT
Start: 2025-04-05 | End: 2025-04-09 | Stop reason: HOSPADM

## 2025-04-05 RX ORDER — ZOLPIDEM TARTRATE 5 MG/1
10 TABLET ORAL NIGHTLY PRN
Status: DISCONTINUED | OUTPATIENT
Start: 2025-04-05 | End: 2025-04-09 | Stop reason: HOSPADM

## 2025-04-05 RX ORDER — POTASSIUM CHLORIDE 7.45 MG/ML
10 INJECTION INTRAVENOUS PRN
Status: DISCONTINUED | OUTPATIENT
Start: 2025-04-05 | End: 2025-04-09 | Stop reason: HOSPADM

## 2025-04-05 RX ORDER — MAGNESIUM SULFATE IN WATER 40 MG/ML
2000 INJECTION, SOLUTION INTRAVENOUS PRN
Status: DISCONTINUED | OUTPATIENT
Start: 2025-04-05 | End: 2025-04-09 | Stop reason: HOSPADM

## 2025-04-05 RX ORDER — MORPHINE SULFATE 4 MG/ML
4 INJECTION, SOLUTION INTRAMUSCULAR; INTRAVENOUS
Status: COMPLETED | OUTPATIENT
Start: 2025-04-05 | End: 2025-04-05

## 2025-04-05 RX ORDER — PANTOPRAZOLE SODIUM 40 MG/1
40 TABLET, DELAYED RELEASE ORAL 2 TIMES DAILY
Status: DISCONTINUED | OUTPATIENT
Start: 2025-04-05 | End: 2025-04-09 | Stop reason: HOSPADM

## 2025-04-05 RX ORDER — OXYCODONE AND ACETAMINOPHEN 5; 325 MG/1; MG/1
2 TABLET ORAL EVERY 4 HOURS PRN
Status: DISCONTINUED | OUTPATIENT
Start: 2025-04-05 | End: 2025-04-09 | Stop reason: HOSPADM

## 2025-04-05 RX ORDER — MULTIVITAMIN WITH IRON
1 TABLET ORAL DAILY
Status: DISCONTINUED | OUTPATIENT
Start: 2025-04-06 | End: 2025-04-09 | Stop reason: HOSPADM

## 2025-04-05 RX ORDER — SODIUM CHLORIDE 0.9 % (FLUSH) 0.9 %
5-40 SYRINGE (ML) INJECTION PRN
Status: DISCONTINUED | OUTPATIENT
Start: 2025-04-05 | End: 2025-04-09 | Stop reason: HOSPADM

## 2025-04-05 RX ORDER — CELECOXIB 200 MG/1
200 CAPSULE ORAL DAILY
Status: DISCONTINUED | OUTPATIENT
Start: 2025-04-11 | End: 2025-04-09 | Stop reason: HOSPADM

## 2025-04-05 RX ORDER — MORPHINE SULFATE 2 MG/ML
2 INJECTION, SOLUTION INTRAMUSCULAR; INTRAVENOUS EVERY 4 HOURS PRN
Status: DISCONTINUED | OUTPATIENT
Start: 2025-04-05 | End: 2025-04-05

## 2025-04-05 RX ORDER — ZOLPIDEM TARTRATE 10 MG/1
10 TABLET ORAL NIGHTLY PRN
COMMUNITY

## 2025-04-05 RX ORDER — ONDANSETRON 2 MG/ML
4 INJECTION INTRAMUSCULAR; INTRAVENOUS ONCE
Status: COMPLETED | OUTPATIENT
Start: 2025-04-05 | End: 2025-04-05

## 2025-04-05 RX ORDER — ONDANSETRON 4 MG/1
4 TABLET, ORALLY DISINTEGRATING ORAL EVERY 8 HOURS PRN
Status: DISCONTINUED | OUTPATIENT
Start: 2025-04-05 | End: 2025-04-09 | Stop reason: HOSPADM

## 2025-04-05 RX ORDER — ATORVASTATIN CALCIUM 40 MG/1
40 TABLET, FILM COATED ORAL NIGHTLY
Status: DISCONTINUED | OUTPATIENT
Start: 2025-04-05 | End: 2025-04-09 | Stop reason: HOSPADM

## 2025-04-05 RX ORDER — POTASSIUM CHLORIDE 1500 MG/1
40 TABLET, EXTENDED RELEASE ORAL PRN
Status: DISCONTINUED | OUTPATIENT
Start: 2025-04-05 | End: 2025-04-09 | Stop reason: HOSPADM

## 2025-04-05 RX ORDER — POLYETHYLENE GLYCOL 3350 17 G/17G
17 POWDER, FOR SOLUTION ORAL DAILY PRN
Status: DISCONTINUED | OUTPATIENT
Start: 2025-04-05 | End: 2025-04-09 | Stop reason: HOSPADM

## 2025-04-05 RX ADMIN — SODIUM CHLORIDE, PRESERVATIVE FREE 10 ML: 5 INJECTION INTRAVENOUS at 21:15

## 2025-04-05 RX ADMIN — ONDANSETRON 4 MG: 2 INJECTION, SOLUTION INTRAMUSCULAR; INTRAVENOUS at 15:17

## 2025-04-05 RX ADMIN — MORPHINE SULFATE 2 MG: 2 INJECTION, SOLUTION INTRAMUSCULAR; INTRAVENOUS at 20:06

## 2025-04-05 RX ADMIN — HYDROMORPHONE HYDROCHLORIDE 0.5 MG: 1 INJECTION, SOLUTION INTRAMUSCULAR; INTRAVENOUS; SUBCUTANEOUS at 23:32

## 2025-04-05 RX ADMIN — GABAPENTIN 600 MG: 300 CAPSULE ORAL at 21:13

## 2025-04-05 RX ADMIN — OXYCODONE AND ACETAMINOPHEN 2 TABLET: 325; 5 TABLET ORAL at 21:14

## 2025-04-05 RX ADMIN — ATORVASTATIN CALCIUM 40 MG: 40 TABLET, FILM COATED ORAL at 19:58

## 2025-04-05 RX ADMIN — MORPHINE SULFATE 2 MG: 2 INJECTION, SOLUTION INTRAMUSCULAR; INTRAVENOUS at 15:17

## 2025-04-05 RX ADMIN — PANTOPRAZOLE SODIUM 40 MG: 40 TABLET, DELAYED RELEASE ORAL at 19:58

## 2025-04-05 RX ADMIN — ONDANSETRON 4 MG: 2 INJECTION, SOLUTION INTRAMUSCULAR; INTRAVENOUS at 20:07

## 2025-04-05 RX ADMIN — MORPHINE SULFATE 4 MG: 4 INJECTION, SOLUTION INTRAMUSCULAR; INTRAVENOUS at 17:00

## 2025-04-05 RX ADMIN — TRAZODONE HYDROCHLORIDE 200 MG: 100 TABLET ORAL at 19:58

## 2025-04-05 RX ADMIN — IOPAMIDOL 75 ML: 755 INJECTION, SOLUTION INTRAVENOUS at 15:52

## 2025-04-05 ASSESSMENT — PAIN DESCRIPTION - LOCATION
LOCATION: ABDOMEN

## 2025-04-05 ASSESSMENT — PAIN SCALES - GENERAL
PAINLEVEL_OUTOF10: 9
PAINLEVEL_OUTOF10: 9
PAINLEVEL_OUTOF10: 7
PAINLEVEL_OUTOF10: 9
PAINLEVEL_OUTOF10: 10
PAINLEVEL_OUTOF10: 9
PAINLEVEL_OUTOF10: 9

## 2025-04-05 ASSESSMENT — PAIN - FUNCTIONAL ASSESSMENT: PAIN_FUNCTIONAL_ASSESSMENT: 0-10

## 2025-04-05 ASSESSMENT — PAIN DESCRIPTION - DESCRIPTORS: DESCRIPTORS: ACHING

## 2025-04-05 NOTE — ED NOTES
ED TO INPATIENT SBAR HANDOFF    Patient Name: Davis Payton   Preferred Name: Davis  : 1970  54 y.o.   Family/Caregiver Present: no   Code Status Order: Prior  PO Status: NPO:No  Telemetry Order: No  C-SSRS: Risk of Suicide: No Risk  Sitter no  NA  Restraints:     Sepsis Risk Score      Situation  Chief Complaint   Patient presents with    Abdominal Pain     Patient presents with abdominal pain, nausea, and history of obstructions. He woke up today and had a BM, but feels like there is still more in his abdomen.      Brief Description of Patient's Condition: Pt presents to ED with complaint of abdominal pain, has history of several SBO and SB resection.  No evidence of blockage on CT today but there was some periumbilical fluid.  Will be admitted for obs.  Is AOX4 and ambulatory.  Mental Status: oriented, alert, coherent, logical, thought processes intact, and able to concentrate and follow conversation  Arrived from:Home  Imaging:   CT ABDOMEN PELVIS W IV CONTRAST Additional Contrast? None   Final Result   No significant change in twisting of the mesentery in the left abdomen or   mesenteric edema however there is no evidence of enterocolitis or bowel   obstruction.      Interval development of small fluid collection in the subcutaneous soft   tissues just posterior to the umbilicus which could be fluid within a ventral   hernia, small subcutaneous hematoma or abscess.      Other stable chronic findings as above.           Abnormal labs:   Abnormal Labs Reviewed   CBC WITH AUTO DIFFERENTIAL - Abnormal; Notable for the following components:       Result Value    RBC 4.00 (*)     Hemoglobin 11.6 (*)     Hematocrit 35.2 (*)     RDW 17.9 (*)     All other components within normal limits       Background  Allergies:   Allergies   Allergen Reactions    Nsaids Nausea Only and Other (See Comments)     Hx of Barretts esophagus    Prochlorperazine Other (See Comments)     No allergic reaction, patient reported

## 2025-04-05 NOTE — ED TRIAGE NOTES
Patient presents with abdominal pain, nausea, and history of obstructions. He woke up today and had a BM, but feels like there is still more in his abdomen.

## 2025-04-05 NOTE — H&P
Hospital Medicine History & Physical      PCP: David Lopez MD    Date of Admission: 4/5/2025    Date of Service: Pt seen/examined on 4/5/25 and Admitted to Inpatient with expected LOS greater than two midnights due to medical therapy.     Chief Complaint: Abdominal pain      History Of Present Illness:      54 y.o. male with history of chronic back pain, GERD with Vaughn's esophagitis, anxiety, depression, gastric bypass surgery and multiple episodes of small bowel obstruction, abdominal wall abscesses status post I&D presented to the emergency room with acute onset of periumbilical abdominal pain that was severe, associated to episodes of vomiting but no diarrhea or constipation.  No fevers, chills or rigors      In the ED, CT showed  No significant change in twisting of the mesentery in the left abdomen or  mesenteric edema however there is no evidence of enterocolitis or bowel  obstruction.     Interval development of small fluid collection in the subcutaneous soft  tissues just posterior to the umbilicus which could be fluid within a ventral  hernia, small subcutaneous hematoma or abscess.    Past Medical History:          Diagnosis Date    Alcoholism (HCC)     last drink 2015    Allergic rhinitis, seasonal     Anemia     Arthritis     right knee    Vaughn's esophagus     Benign intracranial hypertension     Cancer (HCC)     left kidney    Carpal tunnel syndrome     Chronic low back pain     Chronic pain syndrome     Depression     Ear infection 05/06/2022    GERD (gastroesophageal reflux disease)     Headache(784.0)     History of blood transfusion     2018    History of loop recorder 2022    placed in 2021 and removed in 2022 d/t infection    History of spinal fracture 2021    L1,L7,L8 fall T5,T8  - unknown 2022    History of tobacco use     Quit 7/2014    Hyperlipidemia     Left wrist fracture 2020    fall    Lumbar compression fracture (HCC)     Migraine     chronic for 1 year    Morbid  the umbilicus which could be fluid within a ventral   hernia, small subcutaneous hematoma or abscess.      Other stable chronic findings as above.             Consults:    IP CONSULT TO GENERAL SURGERY    ASSESSMENT:    Active Hospital Problems    Diagnosis Date Noted    Abdominal pain [R10.9] 04/05/2025     -Acute periumbilical abdominal pain  -Periumbilical fluid collection concerning for abscess versus hematoma  -History of right periumbilical abscess status post I&D  - History of gastric bypass complicated by recurrent episodes of small bowel obstruction      CT without evidence of acute obstruction  Consulted general surgery for possible I&D  Pain control      -Chronic back pain-continue Neurontin, celecoxib  -Tobacco use disorder-smoking cessation recommended, declines nicotine patches  -CAD-on statin, hold Plavix until eval by surgery  - Mood disorder-continue trazodone, fluoxetine  - GERD with history of Vaughn's esophagus--continue Protonix            DVT Prophylaxis: Lovenox  Diet: Regular  Code Status: Full code       Kaz Ram MD    Thank you David Lopez MD for the opportunity to be involved in this patient's care. If you have any questions or concerns please feel free to contact me at [phone].    Comment: Please note this report has been produced using speech recognition software and may contain errors related to that system including errors in grammar, punctuation, and spelling, as well as words and phrases that may be inappropriate. If there are any questions or concerns, please feel free to contact the dictating provider for clarification.

## 2025-04-05 NOTE — ED PROVIDER NOTES
nightly    ZOLPIDEM (AMBIEN) 10 MG TABLET    Take 1 tablet by mouth nightly as needed for Sleep. Max Daily Amount: 10 mg       ALLERGIES     Nsaids, Prochlorperazine, Valtrex [valacyclovir hcl], Fentanyl, Hydrocodone, and Tolmetin    FAMILYHISTORY       Family History   Problem Relation Age of Onset    Arthritis Mother     Osteoporosis Mother     Cancer Father         Non-Hodgkins Lymphoma    Heart Disease Maternal Uncle     Heart Disease Maternal Uncle     Diabetes Maternal Uncle         SOCIAL HISTORY       Social History     Tobacco Use    Smoking status: Every Day     Current packs/day: 0.50     Average packs/day: 0.5 packs/day for 40.3 years (20.1 ttl pk-yrs)     Types: Cigarettes     Start date: 1/1/1985    Smokeless tobacco: Never    Tobacco comments:     Patient vapes   Vaping Use    Vaping status: Former    Substances: Nicotine, THC, Flavoring    Devices: Refillable tank, Pre-filled pod   Substance Use Topics    Alcohol use: Not Currently     Comment: quit drinking in 2015    Drug use: Yes     Types: Marijuana (Weed)     Comment: medical luke card- occassional use       SCREENINGS     NIHSS:     GCS: Nohemy Coma Scale  Eye Opening: Spontaneous  Best Verbal Response: Oriented  Best Motor Response: Obeys commands  Harvey Coma Scale Score: 15    Heart Score      PECARN Last:       CIWA: CIWA Assessment  BP: 107/71  Pulse: (!) 49  COW Score: No data recorded   CURB 65 Last:     PORT Score:     WELL Criteria:     PERC Score:     CURB 65:      PHYSICAL EXAM  1 or more Elements     ED Triage Vitals [04/05/25 1431]   BP Systolic BP Percentile Diastolic BP Percentile Temp Temp Source Pulse Respirations SpO2   118/83 -- -- 98 °F (36.7 °C) Skin 56 16 99 %      Height Weight - Scale         1.8 m (5' 10.87\") 88 kg (194 lb 0.1 oz)             Physical Exam  Vitals and nursing note reviewed.   Constitutional:       General: He is not in acute distress.  HENT:      Mouth/Throat:      Mouth: Mucous membranes are  findings as above.           No results found.      ED Provider US Interpretation.    No results found.    PROCEDURES   Unless otherwise noted below, none     Procedures      CRITICAL CARE TIME (.cctime)       PAST MEDICAL HISTORY      has a past medical history of Alcoholism (HCC), Allergic rhinitis, seasonal, Anemia, Arthritis, Vaughn's esophagus, Benign intracranial hypertension, Cancer (HCC), Carpal tunnel syndrome, Chronic low back pain, Chronic pain syndrome, Depression, Ear infection (05/06/2022), GERD (gastroesophageal reflux disease), Headache(784.0), History of blood transfusion, History of loop recorder (2022), History of spinal fracture (2021), History of tobacco use, Hyperlipidemia, Left wrist fracture (2020), Lumbar compression fracture (HCC), Migraine, Morbid obesity, Movement disorder, Onychomycosis, Osteoporosis, Sleep apnea, obstructive, Unspecified cerebral artery occlusion with cerebral infarction (2014), Wears dentures, and Wears glasses.     EMERGENCY DEPARTMENT COURSE and DIFFERENTIAL DIAGNOSIS/MDM:   Vitals:    Vitals:    04/05/25 1730 04/05/25 1814 04/05/25 1825 04/05/25 1828   BP: 107/71      Pulse: 56 61 57 (!) 49   Resp: 11 13 18 10   Temp:       TempSrc:       SpO2: 97% 98% 99% 98%   Weight:       Height:           Is this patient to be included in the SEP-1 Core Measure due to severe sepsis or septic shock?   No   Exclusion criteria - the patient is NOT to be included for SEP-1 Core Measure due to:  Infection is not suspected    Patient was given the following medications:  Medications   ondansetron (ZOFRAN) injection 4 mg (4 mg IntraVENous Given 4/5/25 1517)   morphine (PF) injection 2 mg (2 mg IntraVENous Given 4/5/25 1517)   iopamidol (ISOVUE-370) 76 % injection 75 mL (75 mLs IntraVENous Given 4/5/25 1552)   morphine (PF) injection 4 mg (4 mg IntraVENous Given 4/5/25 1700)             Chronic Conditions affecting care:    has a past medical history of Alcoholism (HCC), Allergic

## 2025-04-05 NOTE — PROGRESS NOTES
Medication Reconciliation     List of medications patient is currently taking is complete.     Source of information: 1. Conversation with patient at bedside                                      2. EPIC records      Allergies  Nsaids, Prochlorperazine, Valtrex [valacyclovir hcl], Fentanyl, Hydrocodone, and Tolmetin     Notes regarding home medications:  1. Patient took the morning doses of his home medications today prior to arrival in the ED    Julieth Santana, Pharmacy Intern  4/5/2025 6:01 PM

## 2025-04-06 LAB
ANION GAP SERPL CALCULATED.3IONS-SCNC: 7 MMOL/L (ref 3–16)
BASOPHILS # BLD: 0.1 K/UL (ref 0–0.2)
BASOPHILS NFR BLD: 1.8 %
BUN SERPL-MCNC: 8 MG/DL (ref 7–20)
CALCIUM SERPL-MCNC: 8.4 MG/DL (ref 8.3–10.6)
CHLORIDE SERPL-SCNC: 108 MMOL/L (ref 99–110)
CO2 SERPL-SCNC: 24 MMOL/L (ref 21–32)
CREAT SERPL-MCNC: 1 MG/DL (ref 0.9–1.3)
DEPRECATED RDW RBC AUTO: 18.1 % (ref 12.4–15.4)
EOSINOPHIL # BLD: 0.3 K/UL (ref 0–0.6)
EOSINOPHIL NFR BLD: 4.7 %
GFR SERPLBLD CREATININE-BSD FMLA CKD-EPI: 89 ML/MIN/{1.73_M2}
GLUCOSE SERPL-MCNC: 88 MG/DL (ref 70–99)
HCT VFR BLD AUTO: 35.5 % (ref 40.5–52.5)
HGB BLD-MCNC: 11.7 G/DL (ref 13.5–17.5)
LYMPHOCYTES # BLD: 2.6 K/UL (ref 1–5.1)
LYMPHOCYTES NFR BLD: 40 %
MCH RBC QN AUTO: 28.8 PG (ref 26–34)
MCHC RBC AUTO-ENTMCNC: 32.8 G/DL (ref 31–36)
MCV RBC AUTO: 87.6 FL (ref 80–100)
MONOCYTES # BLD: 0.4 K/UL (ref 0–1.3)
MONOCYTES NFR BLD: 6.5 %
NEUTROPHILS # BLD: 3.1 K/UL (ref 1.7–7.7)
NEUTROPHILS NFR BLD: 47 %
PLATELET # BLD AUTO: 295 K/UL (ref 135–450)
PMV BLD AUTO: 8.8 FL (ref 5–10.5)
POTASSIUM SERPL-SCNC: 4.4 MMOL/L (ref 3.5–5.1)
RBC # BLD AUTO: 4.05 M/UL (ref 4.2–5.9)
SODIUM SERPL-SCNC: 139 MMOL/L (ref 136–145)
WBC # BLD AUTO: 6.6 K/UL (ref 4–11)

## 2025-04-06 PROCEDURE — 6370000000 HC RX 637 (ALT 250 FOR IP): Performed by: HOSPITALIST

## 2025-04-06 PROCEDURE — 6360000002 HC RX W HCPCS

## 2025-04-06 PROCEDURE — 85025 COMPLETE CBC W/AUTO DIFF WBC: CPT

## 2025-04-06 PROCEDURE — 99222 1ST HOSP IP/OBS MODERATE 55: CPT | Performed by: SURGERY

## 2025-04-06 PROCEDURE — 80048 BASIC METABOLIC PNL TOTAL CA: CPT

## 2025-04-06 PROCEDURE — 6360000002 HC RX W HCPCS: Performed by: STUDENT IN AN ORGANIZED HEALTH CARE EDUCATION/TRAINING PROGRAM

## 2025-04-06 PROCEDURE — 1200000000 HC SEMI PRIVATE

## 2025-04-06 PROCEDURE — 96375 TX/PRO/DX INJ NEW DRUG ADDON: CPT

## 2025-04-06 PROCEDURE — 96376 TX/PRO/DX INJ SAME DRUG ADON: CPT

## 2025-04-06 PROCEDURE — 94760 N-INVAS EAR/PLS OXIMETRY 1: CPT

## 2025-04-06 PROCEDURE — 36415 COLL VENOUS BLD VENIPUNCTURE: CPT

## 2025-04-06 PROCEDURE — 6370000000 HC RX 637 (ALT 250 FOR IP): Performed by: STUDENT IN AN ORGANIZED HEALTH CARE EDUCATION/TRAINING PROGRAM

## 2025-04-06 PROCEDURE — 2500000003 HC RX 250 WO HCPCS: Performed by: HOSPITALIST

## 2025-04-06 PROCEDURE — 6360000002 HC RX W HCPCS: Performed by: HOSPITALIST

## 2025-04-06 PROCEDURE — G0378 HOSPITAL OBSERVATION PER HR: HCPCS

## 2025-04-06 RX ORDER — METOCLOPRAMIDE HYDROCHLORIDE 5 MG/ML
10 INJECTION INTRAMUSCULAR; INTRAVENOUS EVERY 6 HOURS PRN
Status: DISCONTINUED | OUTPATIENT
Start: 2025-04-06 | End: 2025-04-09 | Stop reason: HOSPADM

## 2025-04-06 RX ORDER — DIPHENHYDRAMINE HCL 25 MG
25 TABLET ORAL EVERY 6 HOURS PRN
Status: DISCONTINUED | OUTPATIENT
Start: 2025-04-06 | End: 2025-04-09 | Stop reason: HOSPADM

## 2025-04-06 RX ADMIN — THERA TABS 1 TABLET: TAB at 08:16

## 2025-04-06 RX ADMIN — HYDROMORPHONE HYDROCHLORIDE 1 MG: 1 INJECTION, SOLUTION INTRAMUSCULAR; INTRAVENOUS; SUBCUTANEOUS at 14:23

## 2025-04-06 RX ADMIN — FLUOXETINE HYDROCHLORIDE 20 MG: 20 CAPSULE ORAL at 08:15

## 2025-04-06 RX ADMIN — SODIUM CHLORIDE, PRESERVATIVE FREE 10 ML: 5 INJECTION INTRAVENOUS at 08:16

## 2025-04-06 RX ADMIN — PANTOPRAZOLE SODIUM 40 MG: 40 TABLET, DELAYED RELEASE ORAL at 08:16

## 2025-04-06 RX ADMIN — KETOROLAC TROMETHAMINE 15 MG: 15 INJECTION, SOLUTION INTRAMUSCULAR; INTRAVENOUS at 03:37

## 2025-04-06 RX ADMIN — TRAZODONE HYDROCHLORIDE 200 MG: 100 TABLET ORAL at 20:50

## 2025-04-06 RX ADMIN — DIPHENHYDRAMINE HCL 25 MG: 25 TABLET ORAL at 18:33

## 2025-04-06 RX ADMIN — HYDROMORPHONE HYDROCHLORIDE 0.5 MG: 1 INJECTION, SOLUTION INTRAMUSCULAR; INTRAVENOUS; SUBCUTANEOUS at 09:09

## 2025-04-06 RX ADMIN — OXYCODONE AND ACETAMINOPHEN 2 TABLET: 325; 5 TABLET ORAL at 16:43

## 2025-04-06 RX ADMIN — OXYCODONE AND ACETAMINOPHEN 2 TABLET: 325; 5 TABLET ORAL at 05:30

## 2025-04-06 RX ADMIN — GABAPENTIN 600 MG: 300 CAPSULE ORAL at 08:15

## 2025-04-06 RX ADMIN — ATORVASTATIN CALCIUM 40 MG: 40 TABLET, FILM COATED ORAL at 20:49

## 2025-04-06 RX ADMIN — PANTOPRAZOLE SODIUM 40 MG: 40 TABLET, DELAYED RELEASE ORAL at 20:49

## 2025-04-06 RX ADMIN — OXYCODONE AND ACETAMINOPHEN 2 TABLET: 325; 5 TABLET ORAL at 20:50

## 2025-04-06 RX ADMIN — SODIUM CHLORIDE, PRESERVATIVE FREE 10 ML: 5 INJECTION INTRAVENOUS at 20:50

## 2025-04-06 RX ADMIN — ZOLPIDEM TARTRATE 10 MG: 5 TABLET, FILM COATED ORAL at 01:05

## 2025-04-06 RX ADMIN — GABAPENTIN 600 MG: 300 CAPSULE ORAL at 12:45

## 2025-04-06 RX ADMIN — GABAPENTIN 600 MG: 300 CAPSULE ORAL at 18:30

## 2025-04-06 RX ADMIN — OXYCODONE AND ACETAMINOPHEN 2 TABLET: 325; 5 TABLET ORAL at 12:45

## 2025-04-06 RX ADMIN — HYDROMORPHONE HYDROCHLORIDE 1 MG: 1 INJECTION, SOLUTION INTRAMUSCULAR; INTRAVENOUS; SUBCUTANEOUS at 18:30

## 2025-04-06 RX ADMIN — HYDROMORPHONE HYDROCHLORIDE 1 MG: 1 INJECTION, SOLUTION INTRAMUSCULAR; INTRAVENOUS; SUBCUTANEOUS at 23:35

## 2025-04-06 RX ADMIN — GABAPENTIN 600 MG: 300 CAPSULE ORAL at 20:49

## 2025-04-06 ASSESSMENT — PAIN DESCRIPTION - LOCATION
LOCATION: ABDOMEN

## 2025-04-06 ASSESSMENT — PAIN DESCRIPTION - DESCRIPTORS
DESCRIPTORS: ACHING;DISCOMFORT
DESCRIPTORS: ACHING;DISCOMFORT
DESCRIPTORS: ACHING;DISCOMFORT;CRAMPING

## 2025-04-06 ASSESSMENT — PAIN SCALES - GENERAL
PAINLEVEL_OUTOF10: 8
PAINLEVEL_OUTOF10: 8
PAINLEVEL_OUTOF10: 9
PAINLEVEL_OUTOF10: 8
PAINLEVEL_OUTOF10: 7
PAINLEVEL_OUTOF10: 8
PAINLEVEL_OUTOF10: 10
PAINLEVEL_OUTOF10: 8
PAINLEVEL_OUTOF10: 9
PAINLEVEL_OUTOF10: 10

## 2025-04-06 ASSESSMENT — PAIN - FUNCTIONAL ASSESSMENT
PAIN_FUNCTIONAL_ASSESSMENT: ACTIVITIES ARE NOT PREVENTED

## 2025-04-06 NOTE — PLAN OF CARE
Problem: Chronic Conditions and Co-morbidities  Goal: Patient's chronic conditions and co-morbidity symptoms are monitored and maintained or improved  4/6/2025 1803 by Chiquita Stone RN  Outcome: Progressing  4/6/2025 1803 by Chiquita Stone, RN  Outcome: Progressing

## 2025-04-06 NOTE — PROGRESS NOTES
V2.0  [facility] Hospitalist Progress Note      Name:  Davis Payton /Age/Sex: 1970  (54 y.o. male)   MRN & CSN:  [id number] & [id number] Encounter Date/Time: 2025 8:56 AM EDT    Location:  [id number]-0722/4129-01 PCP: David Lopez MD       Hospital Day: 2    Assessment and Plan:   Davis Payton is a 54 y.o. male with PMH of recurrent small bowel obstructions presenting with nausea vomiting and abdominal pain      Plan:  Periumbilical fluid collection  History of periumbilical abscess  - Noted on CT: Fluid within hernia vs. subcutaneous hematoma vs. Abscess  - General Surgery consulted: Note  reviewed.  No obvious small bowel obstruction on CT.  Will plan for small bowel follow-through tomorrow to evaluate for partial SBO, although no plans for surgical intervention at this time.  May benefit from endoscopic evaluation if SBFT is negative  Acute periumbilical abdominal pain  - Etiology not fully clear, may have partial small bowel obstruction  - Patient with ongoing pain needs not adequately controlled with oral medication.  Will continue as needed Dilaudid order  History of gastric bypass complicated by recurrent small bowel obstruction  - No evidence of acute obstruction on imaging this presentation  - Last BM yesterday morning.  Patient denies any flatus  Chronic back pain  - Continue gabapentin and celecoxib  Tobacco use disorder  - Patient counseled on smoking cessation  Coronary disease  - Continuing statin  - Plavix held pending surgery evaluation  GERD  -Continue Protonix    Ppx: Lovenox  Dispo: Home    Subjective:     Chief Complaint: Abdominal Pain (Patient presents with abdominal pain, nausea, and history of obstructions. He woke up today and had a BM, but feels like there is still more in his abdomen. )     Interval history  Patient reports he has not been able to eat much.  Has been tolerating liquids without much issue.  Reports nausea, abdominal pain, and emesis.  Denies flatus or  significant mesenteric twisting in the left hemiabdomen similar to prior examination however there is no evidence of bowel obstruction or enterocolitis. Pelvis: No adenopathy, mass or free fluid. Peritoneum/Retroperitoneum: Mesenteric edema with no free air, adenopathy. Aorta is normal in size with mild scattered calcified plaque. Bones/Soft Tissues: Stable appearance of the osseous structures including L1 vertebral augmentation.  Small amount of fluid in the anterior abdominal wall just posterior to the umbilicus which could be fluid from a small hernia, hematoma or small subcutaneous abscess.     No significant change in twisting of the mesentery in the left abdomen or mesenteric edema however there is no evidence of enterocolitis or bowel obstruction. Interval development of small fluid collection in the subcutaneous soft tissues just posterior to the umbilicus which could be fluid within a ventral hernia, small subcutaneous hematoma or abscess. Other stable chronic findings as above.       Electronically signed by Nav Carmona MD on 4/6/2025 at 8:56 AM

## 2025-04-06 NOTE — CONSULTS
General Surgery Consult Note      Davis Payton   : 1970 MRN: [id number]  Date of Admission: 2025  Admitting Physician:Nav Carmona MD  Primary Care Physician: David Lopez MD    Chief Complaint   Patient presents with    Abdominal Pain     Patient presents with abdominal pain, nausea, and history of obstructions. He woke up today and had a BM, but feels like there is still more in his abdomen.        Reason for Consult: abdominal pain, abdominal wall fluid collection    Diagnosis Present on Admission:   Abdominal pain      History of Present Illness  Davis Payton is a 54 y.o. male with history of gastric bypass, small bowel obstructions, hernia repair, drainage of abdominal wall abscess who presented yesterday with a 1 day history of abdominal pain after having a BM.  He states that the pain was centrally located after his bowel movement.  He did have some nausea and vomiting.  Due to his history of bowel obstructions, he presented to the hospital for further evaluation.  He denies any fevers or chills.  No change in bowel habits.  He denies any changes with his abdominal wall.    Past Medical History:   Diagnosis Date    Alcoholism (HCC)     last drink     Allergic rhinitis, seasonal     Anemia     Arthritis     right knee    Vaughn's esophagus     Benign intracranial hypertension     Cancer (HCC)     left kidney    Carpal tunnel syndrome     Chronic low back pain     Chronic pain syndrome     Depression     Ear infection 2022    GERD (gastroesophageal reflux disease)     Headache(784.0)     History of blood transfusion         History of loop recorder     placed in  and removed in  d/t infection    History of spinal fracture     L1,L7,L8 fall T5,T8  - unknown     History of tobacco use     Quit 2014    Hyperlipidemia     Left wrist fracture 2020    fall    Lumbar compression fracture (HCC)     Migraine     chronic for 1 year    Morbid obesity

## 2025-04-06 NOTE — PROGRESS NOTES
4 Eyes Skin Assessment     NAME:  Davis Payton  YOB: 1970  MEDICAL RECORD NUMBER:  [id number]    The patient is being assessed for  Admission    I agree that at least one RN has performed a thorough Head to Toe Skin Assessment on the patient. ALL assessment sites listed below have been assessed.      Areas assessed by both nurses:    Head, Face, Ears, Shoulders, Back, Chest, Arms, Elbows, Hands, Sacrum. Buttock, Coccyx, Ischium, Legs. Feet and Heels, and Under Medical Devices         Does the Patient have a Wound? No noted wound(s)       Andrei Prevention initiated by RN: No  Wound Care Orders initiated by RN: No    Pressure Injury (Stage 3,4, Unstageable, DTI, NWPT, and Complex wounds) if present, place Wound referral order by RN under : No    New Ostomies, if present place, Ostomy referral order under : No     Nurse 1 eSignature: Electronically signed by KHATIWADA,SWASTIKA, RN on 4/6/25 at 4:59 AM EDT    **SHARE this note so that the co-signing nurse can place an eSignature**    Nurse 2 eSignature: Electronically signed by Francis Hamm RN on 4/6/25 at 4:59 AM EDT

## 2025-04-07 ENCOUNTER — APPOINTMENT (OUTPATIENT)
Dept: GENERAL RADIOLOGY | Age: 55
DRG: 394 | End: 2025-04-07
Payer: COMMERCIAL

## 2025-04-07 PROCEDURE — 6360000002 HC RX W HCPCS: Performed by: STUDENT IN AN ORGANIZED HEALTH CARE EDUCATION/TRAINING PROGRAM

## 2025-04-07 PROCEDURE — APPSS15 APP SPLIT SHARED TIME 0-15 MINUTES: Performed by: PHYSICIAN ASSISTANT

## 2025-04-07 PROCEDURE — 2580000003 HC RX 258: Performed by: STUDENT IN AN ORGANIZED HEALTH CARE EDUCATION/TRAINING PROGRAM

## 2025-04-07 PROCEDURE — 6370000000 HC RX 637 (ALT 250 FOR IP): Performed by: HOSPITALIST

## 2025-04-07 PROCEDURE — 2500000003 HC RX 250 WO HCPCS: Performed by: HOSPITALIST

## 2025-04-07 PROCEDURE — 1200000000 HC SEMI PRIVATE

## 2025-04-07 PROCEDURE — APPNB15 APP NON BILLABLE TIME 0-15 MINS: Performed by: PHYSICIAN ASSISTANT

## 2025-04-07 PROCEDURE — 74250 X-RAY XM SM INT 1CNTRST STD: CPT

## 2025-04-07 RX ORDER — SODIUM CHLORIDE 9 MG/ML
INJECTION, SOLUTION INTRAVENOUS CONTINUOUS
Status: ACTIVE | OUTPATIENT
Start: 2025-04-07 | End: 2025-04-07

## 2025-04-07 RX ADMIN — TRAZODONE HYDROCHLORIDE 200 MG: 100 TABLET ORAL at 20:29

## 2025-04-07 RX ADMIN — HYDROMORPHONE HYDROCHLORIDE 1 MG: 1 INJECTION, SOLUTION INTRAMUSCULAR; INTRAVENOUS; SUBCUTANEOUS at 09:36

## 2025-04-07 RX ADMIN — HYDROMORPHONE HYDROCHLORIDE 1 MG: 1 INJECTION, SOLUTION INTRAMUSCULAR; INTRAVENOUS; SUBCUTANEOUS at 13:54

## 2025-04-07 RX ADMIN — PANTOPRAZOLE SODIUM 40 MG: 40 TABLET, DELAYED RELEASE ORAL at 07:57

## 2025-04-07 RX ADMIN — HYDROMORPHONE HYDROCHLORIDE 1 MG: 1 INJECTION, SOLUTION INTRAMUSCULAR; INTRAVENOUS; SUBCUTANEOUS at 04:25

## 2025-04-07 RX ADMIN — FLUOXETINE HYDROCHLORIDE 20 MG: 20 CAPSULE ORAL at 07:57

## 2025-04-07 RX ADMIN — ZOLPIDEM TARTRATE 10 MG: 5 TABLET, FILM COATED ORAL at 22:21

## 2025-04-07 RX ADMIN — OXYCODONE AND ACETAMINOPHEN 2 TABLET: 325; 5 TABLET ORAL at 17:15

## 2025-04-07 RX ADMIN — GABAPENTIN 600 MG: 300 CAPSULE ORAL at 17:15

## 2025-04-07 RX ADMIN — PANTOPRAZOLE SODIUM 40 MG: 40 TABLET, DELAYED RELEASE ORAL at 20:29

## 2025-04-07 RX ADMIN — SODIUM CHLORIDE, PRESERVATIVE FREE 10 ML: 5 INJECTION INTRAVENOUS at 07:58

## 2025-04-07 RX ADMIN — OXYCODONE AND ACETAMINOPHEN 2 TABLET: 325; 5 TABLET ORAL at 22:20

## 2025-04-07 RX ADMIN — THERA TABS 1 TABLET: TAB at 07:57

## 2025-04-07 RX ADMIN — ATORVASTATIN CALCIUM 40 MG: 40 TABLET, FILM COATED ORAL at 20:29

## 2025-04-07 RX ADMIN — GABAPENTIN 600 MG: 300 CAPSULE ORAL at 12:17

## 2025-04-07 RX ADMIN — HYDROMORPHONE HYDROCHLORIDE 1 MG: 1 INJECTION, SOLUTION INTRAMUSCULAR; INTRAVENOUS; SUBCUTANEOUS at 20:38

## 2025-04-07 RX ADMIN — GABAPENTIN 600 MG: 300 CAPSULE ORAL at 07:57

## 2025-04-07 RX ADMIN — OXYCODONE AND ACETAMINOPHEN 2 TABLET: 325; 5 TABLET ORAL at 06:52

## 2025-04-07 RX ADMIN — SODIUM CHLORIDE: 0.9 INJECTION, SOLUTION INTRAVENOUS at 11:13

## 2025-04-07 RX ADMIN — OXYCODONE AND ACETAMINOPHEN 2 TABLET: 325; 5 TABLET ORAL at 01:48

## 2025-04-07 RX ADMIN — ZOLPIDEM TARTRATE 10 MG: 5 TABLET, FILM COATED ORAL at 01:50

## 2025-04-07 RX ADMIN — OXYCODONE AND ACETAMINOPHEN 2 TABLET: 325; 5 TABLET ORAL at 11:16

## 2025-04-07 RX ADMIN — GABAPENTIN 600 MG: 300 CAPSULE ORAL at 20:29

## 2025-04-07 ASSESSMENT — PAIN SCALES - GENERAL
PAINLEVEL_OUTOF10: 8
PAINLEVEL_OUTOF10: 8
PAINLEVEL_OUTOF10: 7
PAINLEVEL_OUTOF10: 8
PAINLEVEL_OUTOF10: 7
PAINLEVEL_OUTOF10: 8
PAINLEVEL_OUTOF10: 8
PAINLEVEL_OUTOF10: 7
PAINLEVEL_OUTOF10: 8
PAINLEVEL_OUTOF10: 8

## 2025-04-07 ASSESSMENT — PAIN DESCRIPTION - DESCRIPTORS
DESCRIPTORS: ACHING
DESCRIPTORS: ACHING;DISCOMFORT

## 2025-04-07 ASSESSMENT — PAIN DESCRIPTION - LOCATION
LOCATION: ABDOMEN

## 2025-04-07 ASSESSMENT — PAIN - FUNCTIONAL ASSESSMENT
PAIN_FUNCTIONAL_ASSESSMENT: ACTIVITIES ARE NOT PREVENTED

## 2025-04-07 ASSESSMENT — PAIN DESCRIPTION - ORIENTATION: ORIENTATION: RIGHT

## 2025-04-07 ASSESSMENT — PAIN DESCRIPTION - FREQUENCY: FREQUENCY: CONTINUOUS

## 2025-04-07 ASSESSMENT — PAIN DESCRIPTION - ONSET: ONSET: ON-GOING

## 2025-04-07 ASSESSMENT — PAIN DESCRIPTION - PAIN TYPE: TYPE: ACUTE PAIN

## 2025-04-07 NOTE — PLAN OF CARE
Problem: Chronic Conditions and Co-morbidities  Goal: Patient's chronic conditions and co-morbidity symptoms are monitored and maintained or improved  4/7/2025 0021 by Boris Jackson, RN  Outcome: Progressing  Flowsheets (Taken 4/6/2025 1936)  Care Plan - Patient's Chronic Conditions and Co-Morbidity Symptoms are Monitored and Maintained or Improved:   Monitor and assess patient's chronic conditions and comorbid symptoms for stability, deterioration, or improvement   Collaborate with multidisciplinary team to address chronic and comorbid conditions and prevent exacerbation or deterioration   Update acute care plan with appropriate goals if chronic or comorbid symptoms are exacerbated and prevent overall improvement and discharge  4/6/2025 1804 by Chiquita Stone RN  Outcome: Progressing  4/6/2025 1803 by Chiquita Stone RN  Outcome: Progressing  4/6/2025 1803 by Chiquita Stone, RN  Outcome: Progressing

## 2025-04-07 NOTE — PROGRESS NOTES
Comprehensive Nutrition Assessment    Type and Reason for Visit:  Initial, Positive nutrition screen    Nutrition Recommendations/Plan:   Resume nutrition when appropriate  Monitor wt trend     Malnutrition Assessment:  Malnutrition Status:  Moderate malnutrition (04/07/25 1508)    Context:  Acute Illness     Findings of the 6 clinical characteristics of malnutrition:  Energy Intake:  Mild decrease in energy intake  Weight Loss:  No weight loss     Body Fat Loss:  Mild body fat loss Buccal region   Muscle Mass Loss:  Mild muscle mass loss Temples (temporalis)  Fluid Accumulation:  No fluid accumulation     Strength:  Not Performed    Nutrition Assessment:    MST=2 for wt loss and decreased po intake. Pt adm with abd pain. Diet orders were regular upon admit but now NPO for testing. Noted SBFT underway this date, per Surgery. Awaiting results. PMH includes; Gastric Bypass (losing 200 lbs) in 09', esophageal dilation, Vaughn's Esophagus, Left Nephrectomy in 2018 (cancer), Splenectomy. Chronic Pain, I & D of RLQ abd wall abscess, bowel obstructions. Pt reported that normally appetite comes and goes r/t pain. Recently pt has been eating \"light\", but stomach pain continues.Pt declined use of ONS at this time. Encouraged nutrient dense choices to help ensure adequacy. Pt agreed to try. Pt with evidence of fat and muscle loss. When asked about wt, pt reported a usual weight of  \"185 osman.\" Current wt this date at 189 lbs 2.5 oz. Pt did share that he has a new roommate who loves to cook great dishes. Pt admitted to consuming more food than usual, as a result. Will continue to follow wt trend. Will continue to monitor for diet advancement and progress.    Nutrition Related Findings:    Labs reviewed. Noted last BM on 4/5, but pt with flatus. Noted MVI on board. Noted no edema. Wound Type: None       Current Nutrition Intake & Therapies:    Average Meal Intake: %  Average Supplements Intake: Refusing to take  Diet

## 2025-04-07 NOTE — PROGRESS NOTES
General and Vascular Surgery                                                           Daily Progress Note                                                             Armani Morgan PA-C    Pt Name: Davis Payton  Medical Record Number: [id number]  Date of Birth 1970   Today's Date: 4/7/2025    ASSESSMENT/PLAN  Abdominal pain, right side of umbilicus, started Saturday 4/5/25  Denies any Bm since Saturday, 4/5/25  SBFT today   OOB and ambulating well  Will review imaging and give further recommendations as needed. Comfortable at this time    EDUCATION  Patient educated about their illness/diagnosis, stated above, and all questions answered. We discussed the importance of nutrition, medications they are taking, and healthy lifestyle.  SUBJECTIVE  Davis has slightly improved from yesterday. Pain is well controlled. He has no nausea and no vomiting.  He has passed flatus and has not had a bowel movement since Saturday 4/5/25.Current activity is ad agustina    OBJECTIVE  VITALS:  height is 1.8 m (5' 10.87\") and weight is 85.8 kg (189 lb 2.5 oz). His oral temperature is 97.9 °F (36.6 °C). His blood pressure is 107/70 and his pulse is 60. His respiration is 18 and oxygen saturation is 96%. VITALS:  /70   Pulse 60   Temp 97.9 °F (36.6 °C) (Oral)   Resp 18   Ht 1.8 m (5' 10.87\")   Wt 85.8 kg (189 lb 2.5 oz)   SpO2 96%   BMI 26.48 kg/m²   GENERAL: alert, no distress  LUNGS: clear to ausculation, without wheezes, rales or rhonci  HEART: normal rate and regular rhythm  ABDOMEN: soft, mild tenderers on right side of umbilicus, non-distended  EXTREMITY: no cyanosis, clubbing or edema  I/O last 3 completed shifts:  In: 720 [P.O.:720]  Out: -   No intake/output data recorded.    LABS  Recent Labs     04/06/25  0511   WBC 6.6   HGB 11.7*   HCT 35.5*         K 4.4      CO2 24   BUN 8   CREATININE 1.0   CALCIUM 8.4   CBC:   Lab Results    Component Value Date/Time    WBC 6.6 04/06/2025 05:11 AM    RBC 4.05 04/06/2025 05:11 AM    RBC 4.94 01/07/2015 10:47 AM    HGB 11.7 04/06/2025 05:11 AM    HCT 35.5 04/06/2025 05:11 AM    MCV 87.6 04/06/2025 05:11 AM    MCH 28.8 04/06/2025 05:11 AM    MCHC 32.8 04/06/2025 05:11 AM    RDW 18.1 04/06/2025 05:11 AM     04/06/2025 05:11 AM    MPV 8.8 04/06/2025 05:11 AM     CMP:    Lab Results   Component Value Date/Time     04/06/2025 05:11 AM    K 4.4 04/06/2025 05:11 AM     04/06/2025 05:11 AM    CO2 24 04/06/2025 05:11 AM    BUN 8 04/06/2025 05:11 AM    CREATININE 1.0 04/06/2025 05:11 AM    GFRAA >60 09/26/2022 07:27 PM    AGRATIO 2.0 10/14/2024 09:40 PM    LABGLOM 89 04/06/2025 05:11 AM    LABGLOM >90 04/13/2024 05:07 AM    GLUCOSE 88 04/06/2025 05:11 AM    GLUCOSE 84 01/07/2015 10:47 AM    CALCIUM 8.4 04/06/2025 05:11 AM    BILITOT 0.5 10/16/2024 06:02 AM    ALKPHOS 81 10/16/2024 06:02 AM    AST 13 10/16/2024 06:02 AM    ALT 8 10/16/2024 06:02 AM         Armani Morgan PA-C   Electronically signed 4/7/2025 at 12:07 PM    As above  Still with centralized abdominal pain  SBFT underway, await results    Electronically signed by HELEN REYES MD on 4/7/2025 at 2:34 PM

## 2025-04-07 NOTE — PROGRESS NOTES
Pt continues to complain of abdominal pain. Has been getting medicated with Percocet and Dilaudid, receiving something approximately every 2 hours. Pain level never goes below 7. . No BM . Abdomen is slightly distended with palpable nodule at umbilicus toward left side. Has been NPO except for sips of water with meds for pending small bowel follow through

## 2025-04-07 NOTE — PROGRESS NOTES
V2.0  [facility] [place] Hospitalist Progress Note      Name:  Davis Payton /Age/Sex: 1970  (54 y.o. male)   MRN & CSN:  [id number] & [id number] Encounter Date/Time: 2025 8:56 AM EDT    Location:  [id number] PCP: David Lopez MD       Hospital Day: 3    Assessment and Plan:   Davis Payton is a 54 y.o. male with PMH of recurrent small bowel obstructions presenting with nausea vomiting and abdominal pain      Plan:  Periumbilical fluid collection  History of periumbilical abscess  - Noted on CT: Fluid within hernia vs. subcutaneous hematoma vs. Abscess  - General Surgery consulted: Note  reviewed.  Small bowel follow-through study today.  Further recommendations pending imaging  -Patient with continued pain, doing a little better today as patient is n.p.o.  Acute periumbilical abdominal pain  - Etiology not fully clear, may have partial small bowel obstruction  - Patient with ongoing pain needs not adequately controlled with oral medication.  Will continue as needed Dilaudid order  History of gastric bypass complicated by recurrent small bowel obstruction  - No evidence of acute obstruction on imaging this presentation  - Last BM yesterday morning.  Patient denies any flatus  Chronic back pain  - Continue gabapentin and celecoxib  Tobacco use disorder  - Patient counseled on smoking cessation  Coronary disease  - Continuing statin  - Plavix held pending surgery evaluation  GERD  -Continue Protonix    Ppx: Lovenox  Dispo: Home    Subjective:     Chief Complaint: Abdominal Pain (Patient presents with abdominal pain, nausea, and history of obstructions. He woke up today and had a BM, but feels like there is still more in his abdomen. )     Interval history  Patient reports continued significant abdominal pain.  Nausea not as bad due to patient being n.p.o. today for small bowel follow-through study. Reports nausea, abdominal pain, and emesis.  Denies flatus or BM. Last BM 4/5 morning    Review  free fluid. Peritoneum/Retroperitoneum: Mesenteric edema with no free air, adenopathy. Aorta is normal in size with mild scattered calcified plaque. Bones/Soft Tissues: Stable appearance of the osseous structures including L1 vertebral augmentation.  Small amount of fluid in the anterior abdominal wall just posterior to the umbilicus which could be fluid from a small hernia, hematoma or small subcutaneous abscess.     No significant change in twisting of the mesentery in the left abdomen or mesenteric edema however there is no evidence of enterocolitis or bowel obstruction. Interval development of small fluid collection in the subcutaneous soft tissues just posterior to the umbilicus which could be fluid within a ventral hernia, small subcutaneous hematoma or abscess. Other stable chronic findings as above.       Electronically signed by Nav Carmona MD on 4/7/2025 at 8:40 AM

## 2025-04-08 ENCOUNTER — APPOINTMENT (OUTPATIENT)
Dept: GENERAL RADIOLOGY | Age: 55
DRG: 394 | End: 2025-04-08
Payer: COMMERCIAL

## 2025-04-08 PROCEDURE — 2500000003 HC RX 250 WO HCPCS: Performed by: HOSPITALIST

## 2025-04-08 PROCEDURE — 1200000000 HC SEMI PRIVATE

## 2025-04-08 PROCEDURE — 6360000002 HC RX W HCPCS: Performed by: STUDENT IN AN ORGANIZED HEALTH CARE EDUCATION/TRAINING PROGRAM

## 2025-04-08 PROCEDURE — 6370000000 HC RX 637 (ALT 250 FOR IP): Performed by: HOSPITALIST

## 2025-04-08 PROCEDURE — 94760 N-INVAS EAR/PLS OXIMETRY 1: CPT

## 2025-04-08 PROCEDURE — 2580000003 HC RX 258: Performed by: STUDENT IN AN ORGANIZED HEALTH CARE EDUCATION/TRAINING PROGRAM

## 2025-04-08 PROCEDURE — 74018 RADEX ABDOMEN 1 VIEW: CPT

## 2025-04-08 PROCEDURE — APPSS15 APP SPLIT SHARED TIME 0-15 MINUTES: Performed by: PHYSICIAN ASSISTANT

## 2025-04-08 PROCEDURE — APPNB15 APP NON BILLABLE TIME 0-15 MINS: Performed by: PHYSICIAN ASSISTANT

## 2025-04-08 RX ORDER — SODIUM CHLORIDE, SODIUM LACTATE, POTASSIUM CHLORIDE, CALCIUM CHLORIDE 600; 310; 30; 20 MG/100ML; MG/100ML; MG/100ML; MG/100ML
INJECTION, SOLUTION INTRAVENOUS CONTINUOUS
Status: DISCONTINUED | OUTPATIENT
Start: 2025-04-08 | End: 2025-04-09

## 2025-04-08 RX ADMIN — GABAPENTIN 600 MG: 300 CAPSULE ORAL at 17:52

## 2025-04-08 RX ADMIN — PANTOPRAZOLE SODIUM 40 MG: 40 TABLET, DELAYED RELEASE ORAL at 08:41

## 2025-04-08 RX ADMIN — SODIUM CHLORIDE, POTASSIUM CHLORIDE, SODIUM LACTATE AND CALCIUM CHLORIDE: 600; 310; 30; 20 INJECTION, SOLUTION INTRAVENOUS at 09:05

## 2025-04-08 RX ADMIN — TRAZODONE HYDROCHLORIDE 200 MG: 100 TABLET ORAL at 20:07

## 2025-04-08 RX ADMIN — GABAPENTIN 600 MG: 300 CAPSULE ORAL at 12:49

## 2025-04-08 RX ADMIN — FLUOXETINE HYDROCHLORIDE 20 MG: 20 CAPSULE ORAL at 08:41

## 2025-04-08 RX ADMIN — HYDROMORPHONE HYDROCHLORIDE 1 MG: 1 INJECTION, SOLUTION INTRAMUSCULAR; INTRAVENOUS; SUBCUTANEOUS at 07:50

## 2025-04-08 RX ADMIN — GABAPENTIN 600 MG: 300 CAPSULE ORAL at 20:08

## 2025-04-08 RX ADMIN — GABAPENTIN 600 MG: 300 CAPSULE ORAL at 08:41

## 2025-04-08 RX ADMIN — OXYCODONE AND ACETAMINOPHEN 2 TABLET: 325; 5 TABLET ORAL at 04:55

## 2025-04-08 RX ADMIN — OXYCODONE AND ACETAMINOPHEN 2 TABLET: 325; 5 TABLET ORAL at 15:53

## 2025-04-08 RX ADMIN — SODIUM CHLORIDE, PRESERVATIVE FREE 10 ML: 5 INJECTION INTRAVENOUS at 07:49

## 2025-04-08 RX ADMIN — OXYCODONE AND ACETAMINOPHEN 2 TABLET: 325; 5 TABLET ORAL at 11:47

## 2025-04-08 RX ADMIN — ATORVASTATIN CALCIUM 40 MG: 40 TABLET, FILM COATED ORAL at 20:08

## 2025-04-08 RX ADMIN — ZOLPIDEM TARTRATE 10 MG: 5 TABLET, FILM COATED ORAL at 22:01

## 2025-04-08 RX ADMIN — PANTOPRAZOLE SODIUM 40 MG: 40 TABLET, DELAYED RELEASE ORAL at 20:07

## 2025-04-08 RX ADMIN — OXYCODONE AND ACETAMINOPHEN 2 TABLET: 325; 5 TABLET ORAL at 20:07

## 2025-04-08 RX ADMIN — HYDROMORPHONE HYDROCHLORIDE 1 MG: 1 INJECTION, SOLUTION INTRAMUSCULAR; INTRAVENOUS; SUBCUTANEOUS at 02:02

## 2025-04-08 RX ADMIN — THERA TABS 1 TABLET: TAB at 08:41

## 2025-04-08 ASSESSMENT — PAIN SCALES - GENERAL
PAINLEVEL_OUTOF10: 7
PAINLEVEL_OUTOF10: 7
PAINLEVEL_OUTOF10: 8
PAINLEVEL_OUTOF10: 7

## 2025-04-08 ASSESSMENT — PAIN DESCRIPTION - DESCRIPTORS
DESCRIPTORS: ACHING;DISCOMFORT
DESCRIPTORS: ACHING;DISCOMFORT

## 2025-04-08 ASSESSMENT — PAIN DESCRIPTION - LOCATION
LOCATION: ABDOMEN

## 2025-04-08 NOTE — PROGRESS NOTES
V2.0    [facility] [place] Progress Note      Name:  Davis Payton /Age/Sex: 1970  (54 y.o. male)   MRN & CSN:  [id number] & [id number] Encounter Date/Time: 2025 4:03 PM EDT   Location:  [id number] PCP: David Lopez MD     Attending:Dorian Booth DO       Hospital Day: 4  Brief HPI  Davis Payton is a 54 y.o. male with pertinent PMHx of gastric bypass with recurrent SBO who presented complaining of abdominal pain with associated nausea and vomiting.  Assessment & Plan     Periumbilical abdominal pain  - SBFT negative for SBO  - KUB today showing mildly dilated colon  - General Surgery following  - Continue as needed pain control  - Hold Reglan secondary to bradycardia  - Advance diet as tolerated    Chronic back pain  - Continue gabapentin and celecoxib    History of coronary artery disease  - Continue atorvastatin  - Holding Plavix currently    GERD  - Continue Protonix      Diet ADULT DIET; Full Liquid   DVT Prophylaxis [x] Lovenox, []  Heparin, [] SCDs, [] Ambulation,  [] Eliquis, [] Xarelto  [] Coumadin   Code Status Full Code   Disposition From: Home  Expected Disposition: Home  Estimated Date of Discharge:            Subjective:     Chief Complaint: Abdominal pain    Patient was seen and examined today sitting up in bed  Says he continues to have some abdominal pain remains periumbilical  Not having much nausea but says that he has been eating in a while  Would like to trial diet  Denies lightheadedness or dizziness    Review of Systems:      Pertinent positives and negatives discussed in HPI    Objective:   No intake or output data in the 24 hours ending 25 1603   Vitals:   Vitals:    25 0455 25 0600 25 0843 25 1203   BP:   97/65    Pulse:   (!) 46    Resp: 18  16    Temp:   97.9 °F (36.6 °C)    TempSrc:   Oral    SpO2:   96% 97%   Weight:  86 kg (189 lb 9.5 oz)     Height:             Physical Exam:    Physical Exam  Constitutional:       General: He

## 2025-04-08 NOTE — PROGRESS NOTES
Small bowel follow through results still pending. Has had sips of water with meds. Pain meds being given every 2-3 hrs with alternating schedule of Percocet and Dilaudid. Concernsfor safety whils receiving these pain medication at these intervals as Pt's eyes close mid sentence as well as Pt seen leaning over side of bed in a stuporous state. Redirected by staff as needed .

## 2025-04-08 NOTE — PROGRESS NOTES
General and Vascular Surgery                                                           Daily Progress Note                                                             Armani Morgan PA-C    Pt Name: Davis Payton  Medical Record Number: [id number]  Date of Birth 1970   Today's Date: 4/8/2025    ASSESSMENT/PLAN  Abdominal pain, right side of umbilicus, started Saturday 4/5/25  Denies any significant change from yesterday  Denies any Bm since Saturday, 4/5/25  SBFT yesterday was negative for small bowel obstruction. Will get a follow up xray this AM to further monitor passage of contrast with ongoing symptoms.   OOB and ambulating well  Will review imaging and give further recommendations as needed. Comfortable at this time    EDUCATION  Patient educated about their illness/diagnosis, stated above, and all questions answered. We discussed the importance of nutrition, medications they are taking, and healthy lifestyle.  SUBJECTIVE  Davis is unchanged  from yesterday. Pain is well controlled. He has no nausea and no vomiting.  He has passed flatus and has not had a bowel movement since Saturday 4/5/25.Current activity is ad agustina    OBJECTIVE  VITALS:  height is 1.8 m (5' 10.87\") and weight is 86 kg (189 lb 9.5 oz). His oral temperature is 97.9 °F (36.6 °C). His blood pressure is 97/65 and his pulse is 46 (abnormal). His respiration is 16 and oxygen saturation is 96%. VITALS:  BP 97/65   Pulse (!) 46   Temp 97.9 °F (36.6 °C) (Oral)   Resp 16   Ht 1.8 m (5' 10.87\")   Wt 86 kg (189 lb 9.5 oz)   SpO2 96%   BMI 26.54 kg/m²   GENERAL: alert, no distress  LUNGS: clear to ausculation, without wheezes, rales or rhonci  HEART: Bradycardic   ABDOMEN: soft, mild tenderers on right side of umbilicus, non-distended  EXTREMITY: no cyanosis, clubbing or edema  No intake/output data recorded.  No intake/output data recorded.    LABS  Recent Labs     04/06/25  4701    WBC 6.6   HGB 11.7*   HCT 35.5*         K 4.4      CO2 24   BUN 8   CREATININE 1.0   CALCIUM 8.4   CBC:   Lab Results   Component Value Date/Time    WBC 6.6 04/06/2025 05:11 AM    RBC 4.05 04/06/2025 05:11 AM    RBC 4.94 01/07/2015 10:47 AM    HGB 11.7 04/06/2025 05:11 AM    HCT 35.5 04/06/2025 05:11 AM    MCV 87.6 04/06/2025 05:11 AM    MCH 28.8 04/06/2025 05:11 AM    MCHC 32.8 04/06/2025 05:11 AM    RDW 18.1 04/06/2025 05:11 AM     04/06/2025 05:11 AM    MPV 8.8 04/06/2025 05:11 AM     CMP:    Lab Results   Component Value Date/Time     04/06/2025 05:11 AM    K 4.4 04/06/2025 05:11 AM     04/06/2025 05:11 AM    CO2 24 04/06/2025 05:11 AM    BUN 8 04/06/2025 05:11 AM    CREATININE 1.0 04/06/2025 05:11 AM    GFRAA >60 09/26/2022 07:27 PM    AGRATIO 2.0 10/14/2024 09:40 PM    LABGLOM 89 04/06/2025 05:11 AM    LABGLOM >90 04/13/2024 05:07 AM    GLUCOSE 88 04/06/2025 05:11 AM    GLUCOSE 84 01/07/2015 10:47 AM    CALCIUM 8.4 04/06/2025 05:11 AM    BILITOT 0.5 10/16/2024 06:02 AM    ALKPHOS 81 10/16/2024 06:02 AM    AST 13 10/16/2024 06:02 AM    ALT 8 10/16/2024 06:02 AM         Armani Morgan PA-C   Electronically signed 4/8/2025 at 11:11 AM    As above  Improving but still with cramping pain  Starting PO  Monitor, if able will try to discharge later today or AM tomorrow    Electronically signed by HELEN REYES MD on 4/8/2025 at 1:20 PM

## 2025-04-09 VITALS
HEART RATE: 62 BPM | RESPIRATION RATE: 17 BRPM | DIASTOLIC BLOOD PRESSURE: 75 MMHG | SYSTOLIC BLOOD PRESSURE: 115 MMHG | TEMPERATURE: 97.6 F | WEIGHT: 187.72 LBS | HEIGHT: 71 IN | OXYGEN SATURATION: 99 % | BODY MASS INDEX: 26.28 KG/M2

## 2025-04-09 PROCEDURE — 6370000000 HC RX 637 (ALT 250 FOR IP): Performed by: HOSPITALIST

## 2025-04-09 RX ORDER — OXYCODONE HYDROCHLORIDE 5 MG/1
5 TABLET ORAL EVERY 6 HOURS PRN
Qty: 12 TABLET | Refills: 0 | Status: SHIPPED | OUTPATIENT
Start: 2025-04-09 | End: 2025-04-12

## 2025-04-09 RX ADMIN — FLUOXETINE HYDROCHLORIDE 20 MG: 20 CAPSULE ORAL at 09:31

## 2025-04-09 RX ADMIN — OXYCODONE AND ACETAMINOPHEN 2 TABLET: 325; 5 TABLET ORAL at 09:31

## 2025-04-09 RX ADMIN — GABAPENTIN 600 MG: 300 CAPSULE ORAL at 09:31

## 2025-04-09 RX ADMIN — THERA TABS 1 TABLET: TAB at 09:31

## 2025-04-09 RX ADMIN — OXYCODONE AND ACETAMINOPHEN 2 TABLET: 325; 5 TABLET ORAL at 04:57

## 2025-04-09 RX ADMIN — PANTOPRAZOLE SODIUM 40 MG: 40 TABLET, DELAYED RELEASE ORAL at 09:31

## 2025-04-09 ASSESSMENT — PAIN SCALES - GENERAL
PAINLEVEL_OUTOF10: 5
PAINLEVEL_OUTOF10: 7

## 2025-04-09 ASSESSMENT — PAIN DESCRIPTION - DESCRIPTORS
DESCRIPTORS: ACHING;DISCOMFORT;CRAMPING
DESCRIPTORS: ACHING;DISCOMFORT

## 2025-04-09 ASSESSMENT — PAIN DESCRIPTION - ORIENTATION: ORIENTATION: RIGHT;MID

## 2025-04-09 ASSESSMENT — PAIN DESCRIPTION - LOCATION
LOCATION: ABDOMEN
LOCATION: ABDOMEN

## 2025-04-09 ASSESSMENT — PAIN DESCRIPTION - FREQUENCY: FREQUENCY: CONTINUOUS

## 2025-04-09 ASSESSMENT — PAIN DESCRIPTION - PAIN TYPE: TYPE: ACUTE PAIN

## 2025-04-09 ASSESSMENT — PAIN DESCRIPTION - ONSET: ONSET: ON-GOING

## 2025-04-09 NOTE — PROGRESS NOTES
No IV present at discharge, meds to bed delivered, discharge instructions given to the pt, all questions answered, pt verbalized understanding, pt walked down the gabriel for discharge home.     Electronically signed by Ami Almanza RN on 4/9/25 at 12:29 PM EDT

## 2025-04-09 NOTE — CARE COORDINATION
4/9 Discharge to home. AVS reviewed/no needs. Electronically signed by Faiza Gutierrez RN on 4/9/2025 at 10:14 AM

## 2025-04-09 NOTE — PROGRESS NOTES
CLINICAL PHARMACY NOTE: MEDS TO BEDS    Total # of Prescriptions Filled: 1   The following medications were delivered to the patient:  Current Discharge Medication List        START taking these medications    Details   oxyCODONE (ROXICODONE) 5 MG immediate release tablet Take 1 tablet by mouth every 6 hours as needed for Pain for up to 3 days. Intended supply: 3 days. Take lowest dose possible to manage pain Max Daily Amount: 20 mg  Qty: 12 tablet, Refills: 0    Comments: Reduce doses taken as pain becomes manageable  Associated Diagnoses: Periumbilical abdominal pain               Additional Documentation: Went over instructions with patient. Left meds on bedside tray

## 2025-04-09 NOTE — PROGRESS NOTES
Pt alert and oriented, VSS, tolerated morning meds, +BM last night per pt, abd cramping 5/10 today, Denies any N/V. Pt now on regular diet, breakfast ordered. No other needs voiced at this time , pt resting comfortably, care ongoing.     Electronically signed by Ami Almanza RN on 4/9/25 at 9:38 AM EDT

## 2025-04-09 NOTE — PLAN OF CARE
Problem: Chronic Conditions and Co-morbidities  Goal: Patient's chronic conditions and co-morbidity symptoms are monitored and maintained or improved  4/9/2025 0125 by Boris Jackson, RN  Outcome: Progressing  Flowsheets (Taken 4/8/2025 1936)  Care Plan - Patient's Chronic Conditions and Co-Morbidity Symptoms are Monitored and Maintained or Improved:   Monitor and assess patient's chronic conditions and comorbid symptoms for stability, deterioration, or improvement   Collaborate with multidisciplinary team to address chronic and comorbid conditions and prevent exacerbation or deterioration   Update acute care plan with appropriate goals if chronic or comorbid symptoms are exacerbated and prevent overall improvement and discharge  4/8/2025 1155 by Yamel Shin, RN  Outcome: Progressing     Problem: Nutrition Deficit:  Goal: Optimize nutritional status  4/9/2025 0125 by Boris Jackson, RN  Outcome: Progressing  4/8/2025 1155 by Yamel Shin, RN  Outcome: Progressing

## 2025-04-09 NOTE — DISCHARGE SUMMARY
V2.0  Discharge Summary    Name:  Davis Payton /Age/Sex: 1970 (54 y.o. male)   Admit Date: 2025  Discharge Date: 25    MRN & CSN:  [id number] & [id number] Encounter Date and Time 25 9:58 AM EDT    Attending:  Dorian Booth DO Discharging Provider: Dorian Booth DO       Hospital Course:     Brief HPI: Davis Payton is a 54 y.o. male with a pertinent PMHx of gastric bypass with recurrent SBO who presented complaining of periumbilical abdominal pain with associated nausea and vomiting.    Brief Problem Based Course:     Periumbilical abdominal pain  - Initial CT in the ED showed small fluid collection in the subcutaneous soft tissues posterior to the umbilicus concerning for ventral hernia versus subcutaneous hematoma versus abscess  - General Surgery was consulted who recommended no surgical intervention  - Had a small bowel follow-through with a KUB which was negative for bowel obstruction he also had a bowel movement prior to discharge  - Patient was made n.p.o. and had slow improvement in his abdominal pain and was slowly restarted on a diet which he tolerated well      The patient expressed appropriate understanding of, and agreement with the discharge recommendations, medications, and plan.     Consults this admission:  IP CONSULT TO GENERAL SURGERY    Discharge Diagnosis:   Abdominal pain    Discharge Instruction:   Follow up appointments: PCP  Primary care physician: David Lopez MD within 1 week  Diet: regular diet   Activity: activity as tolerated  Disposition: Discharged to:   [x]Home, []C, []SNF, []Acute Rehab, []Hospice   Condition on discharge: Stable  Labs and Tests to be Followed up as an outpatient by PCP or Specialist:     Discharge Medications:        Medication List        START taking these medications      oxyCODONE 5 MG immediate release tablet  Commonly known as: Roxicodone  Take 1 tablet by mouth every 6 hours as needed for Pain for up to 3 days.

## 2025-04-09 NOTE — PROGRESS NOTES
Secure message sent to BENJIE Sanz regarding the Pt's lack of IV access despite several attempts to place one . IV fluids not infusing. Response was that IV and fluids can stay off if not able to find a vein.

## 2025-04-09 NOTE — PROGRESS NOTES
Pt has been able to rest comfortably . Need for pain meds have decreased. Did receive a dose of Percocets around 8pm  and hasn't requested another dose thus far. Is tolerating full liquids .

## 2025-04-09 NOTE — PLAN OF CARE
Problem: Chronic Conditions and Co-morbidities  Goal: Patient's chronic conditions and co-morbidity symptoms are monitored and maintained or improved  4/9/2025 1122 by Ami Almanza RN  Outcome: Progressing  4/9/2025 0125 by Boris Jackson RN  Outcome: Progressing  Flowsheets (Taken 4/8/2025 1936)  Care Plan - Patient's Chronic Conditions and Co-Morbidity Symptoms are Monitored and Maintained or Improved:   Monitor and assess patient's chronic conditions and comorbid symptoms for stability, deterioration, or improvement   Collaborate with multidisciplinary team to address chronic and comorbid conditions and prevent exacerbation or deterioration   Update acute care plan with appropriate goals if chronic or comorbid symptoms are exacerbated and prevent overall improvement and discharge     Problem: Nutrition Deficit:  Goal: Optimize nutritional status  4/9/2025 1122 by Ami Almanza RN  Outcome: Progressing  4/9/2025 0125 by Boris Jackson RN  Outcome: Progressing

## 2025-04-10 ENCOUNTER — CARE COORDINATION (OUTPATIENT)
Dept: CASE MANAGEMENT | Age: 55
End: 2025-04-10

## 2025-04-10 ENCOUNTER — TELEPHONE (OUTPATIENT)
Dept: FAMILY MEDICINE CLINIC | Age: 55
End: 2025-04-10

## 2025-04-10 NOTE — CARE COORDINATION
Care Transitions Note    Initial Call - Call within 2 business days of discharge: Yes    Attempted to reach patient for transitions of care follow up. Unable to reach patient.    Outreach Attempts:   1ST CTC attempt to reach Pt regarding recent hospital discharge.  CTC left voice recording with call back number requesting a call back. Will attempt to reach patient again.    Patient: Davis Payton    Patient : 1970   MRN: [id number]     Reason for Admission: Abdominal pain  Discharge Date: 25    RURS: Readmission Risk Score: 10.4    Last Discharge Facility       Date Complaint Diagnosis Description Type Department Provider    25 Abdominal Pain Periumbilical abdominal pain ... ED to Hosp-Admission (Discharged) (ADMITTED) [facility] Dorian Booth, DO; Kaz Ram ...            Was this an external facility discharge? No    Follow Up Appointment:   Patient does not have a follow up appointment scheduled at time of call.  Unable to reach patient  Future Appointments         Provider Specialty Dept Phone    2025 9:20 AM Prince Buckley MD Sleep Medicine [phone]            Plan for follow-up on next business day.      Thank You,    Della Asher RN  Care Transition Coordinator  Contact Number:[phone]

## 2025-04-11 ENCOUNTER — CARE COORDINATION (OUTPATIENT)
Dept: CASE MANAGEMENT | Age: 55
End: 2025-04-11

## 2025-04-11 ENCOUNTER — TELEPHONE (OUTPATIENT)
Dept: FAMILY MEDICINE CLINIC | Age: 55
End: 2025-04-11

## 2025-04-11 NOTE — PROGRESS NOTES
Physician Progress Note      PATIENT:               SHIN SALINAS  CSN #:                  [id number]  :                       1970  ADMIT DATE:       2025 2:27 PM  DISCH DATE:        2025 1:07 PM  RESPONDING  PROVIDER #:        Dorian Booth DO          QUERY TEXT:    Internal Medicine,    Patient admitted with abdominal pain, nausea and vomiting. The cause is   unclear. Please document the most likely cause:    Indicators:  -risk: chronic pain, bariatric status  -SBO ruled out, dilated colon and ventral hernia vs subcutaneous hematoma vs   abscess documented.  Treatment: labs, imaging, Surgical consult, PPI, medical management    Thank you    The clinical indicators include:  Options provided:  -- Abdominal Pain related to dilated colon  -- Abdominal Pain related to ventral hernia  -- Abdominal Pain related to  -- Other - I will add my own diagnosis  -- Disagree - Not applicable / Not valid  -- Disagree - Clinically unable to determine / Unknown  -- Refer to Clinical Documentation Reviewer    PROVIDER RESPONSE TEXT:    This patient has abdominal pain related to ventral hernia    Query created by: Selma Montes De Oca on 4/10/2025 5:42 PM      Electronically signed by:  Dorian Booth DO 2025 4:07 PM

## 2025-04-11 NOTE — CARE COORDINATION
Care Transitions Note    Initial Call - Call within 2 business days of discharge: Yes    Attempted to reach patient for transitions of care follow up. Unable to reach patient.    Outreach Attempts:   2ND CTC attempt to reach Pt regarding recent hospital discharge.  CTC left voice recording with call back number requesting a call back.  CTN will close out CTN episode at this time.    Patient: Davis Payton    Patient : 1970   MRN: [id number]     Reason for Admission: Abdominal pain   Discharge Date: 25    RURS: Readmission Risk Score: 10.4    Last Discharge Facility       Date Complaint Diagnosis Description Type Department Provider    25 Abdominal Pain Periumbilical abdominal pain ... ED to Hosp-Admission (Discharged) (ADMITTED) [facility] Dorian Booth, DO; Kaz Ram ...            Was this an external facility discharge? No    Follow Up Appointment:   Patient does not have a follow up appointment scheduled at time of call.  Unable to reach patient, will forward a message to PCP pool  Future Appointments         Provider Specialty Dept Phone    2025 9:20 AM Prince Buckley MD Sleep Medicine [phone]            No further follow-up call indicated     Thank You,    Della Asher RN  Care Transition Coordinator  Contact Number:[phone]

## 2025-04-21 ASSESSMENT — SLEEP AND FATIGUE QUESTIONNAIRES
HOW LIKELY ARE YOU TO NOD OFF OR FALL ASLEEP WHILE SITTING AND READING: MODERATE CHANCE OF DOZING
HOW LIKELY ARE YOU TO NOD OFF OR FALL ASLEEP WHILE SITTING INACTIVE IN A PUBLIC PLACE: WOULD NEVER DOZE
HOW LIKELY ARE YOU TO NOD OFF OR FALL ASLEEP WHILE SITTING AND READING: MODERATE CHANCE OF DOZING
HOW LIKELY ARE YOU TO NOD OFF OR FALL ASLEEP IN A CAR, WHILE STOPPED FOR A FEW MINUTES IN TRAFFIC: WOULD NEVER DOZE
HOW LIKELY ARE YOU TO NOD OFF OR FALL ASLEEP WHILE LYING DOWN TO REST IN THE AFTERNOON WHEN CIRCUMSTANCES PERMIT: SLIGHT CHANCE OF DOZING
HOW LIKELY ARE YOU TO NOD OFF OR FALL ASLEEP WHILE SITTING QUIETLY AFTER LUNCH WITHOUT ALCOHOL: MODERATE CHANCE OF DOZING
HOW LIKELY ARE YOU TO NOD OFF OR FALL ASLEEP WHILE WATCHING TV: HIGH CHANCE OF DOZING
HOW LIKELY ARE YOU TO NOD OFF OR FALL ASLEEP IN A CAR, WHILE STOPPED FOR A FEW MINUTES IN TRAFFIC: WOULD NEVER DOZE
HOW LIKELY ARE YOU TO NOD OFF OR FALL ASLEEP WHEN YOU ARE A PASSENGER IN A CAR FOR AN HOUR WITHOUT A BREAK: WOULD NEVER DOZE
ESS TOTAL SCORE: 8
HOW LIKELY ARE YOU TO NOD OFF OR FALL ASLEEP WHILE SITTING AND TALKING TO SOMEONE: WOULD NEVER DOZE
HOW LIKELY ARE YOU TO NOD OFF OR FALL ASLEEP WHILE SITTING AND TALKING TO SOMEONE: WOULD NEVER DOZE
HOW LIKELY ARE YOU TO NOD OFF OR FALL ASLEEP WHILE SITTING INACTIVE IN A PUBLIC PLACE: WOULD NEVER DOZE
HOW LIKELY ARE YOU TO NOD OFF OR FALL ASLEEP WHILE LYING DOWN TO REST IN THE AFTERNOON WHEN CIRCUMSTANCES PERMIT: SLIGHT CHANCE OF DOZING
HOW LIKELY ARE YOU TO NOD OFF OR FALL ASLEEP WHILE SITTING QUIETLY AFTER LUNCH WITHOUT ALCOHOL: MODERATE CHANCE OF DOZING
HOW LIKELY ARE YOU TO NOD OFF OR FALL ASLEEP WHILE WATCHING TV: HIGH CHANCE OF DOZING
HOW LIKELY ARE YOU TO NOD OFF OR FALL ASLEEP WHEN YOU ARE A PASSENGER IN A CAR FOR AN HOUR WITHOUT A BREAK: WOULD NEVER DOZE

## 2025-04-22 ENCOUNTER — OFFICE VISIT (OUTPATIENT)
Dept: SLEEP MEDICINE | Age: 55
End: 2025-04-22
Payer: COMMERCIAL

## 2025-04-22 VITALS
HEIGHT: 71 IN | OXYGEN SATURATION: 100 % | DIASTOLIC BLOOD PRESSURE: 60 MMHG | SYSTOLIC BLOOD PRESSURE: 100 MMHG | HEART RATE: 55 BPM | WEIGHT: 191.6 LBS | RESPIRATION RATE: 18 BRPM | BODY MASS INDEX: 26.82 KG/M2 | TEMPERATURE: 97.7 F

## 2025-04-22 DIAGNOSIS — G47.33 OSA TREATED WITH BIPAP: ICD-10-CM

## 2025-04-22 DIAGNOSIS — K21.00 GASTROESOPHAGEAL REFLUX DISEASE WITH ESOPHAGITIS, UNSPECIFIED WHETHER HEMORRHAGE: Chronic | ICD-10-CM

## 2025-04-22 DIAGNOSIS — G47.33 OSA (OBSTRUCTIVE SLEEP APNEA): Primary | ICD-10-CM

## 2025-04-22 DIAGNOSIS — Z99.89 DEPENDENCE ON OTHER ENABLING MACHINES AND DEVICES: ICD-10-CM

## 2025-04-22 PROCEDURE — G2211 COMPLEX E/M VISIT ADD ON: HCPCS | Performed by: PSYCHIATRY & NEUROLOGY

## 2025-04-22 PROCEDURE — 99204 OFFICE O/P NEW MOD 45 MIN: CPT | Performed by: PSYCHIATRY & NEUROLOGY

## 2025-04-22 ASSESSMENT — ENCOUNTER SYMPTOMS
EYES NEGATIVE: 1
RESPIRATORY NEGATIVE: 1
GASTROINTESTINAL NEGATIVE: 1
ALLERGIC/IMMUNOLOGIC NEGATIVE: 1

## 2025-04-22 NOTE — PATIENT INSTRUCTIONS
Orders Placed This Encounter   Procedures    Home Sleep Study     Standing Status:   Future     Expected Date:   4/22/2025     Expiration Date:   4/22/2026     Scheduling Instructions:      Lost 50% of his weight since the last sleep study and 30% of his since last titration 2028     Location For Sleep Study:   [facility] Sleep Lab Location:   [facility]

## 2025-04-22 NOTE — PROGRESS NOTES
MD LIBERTY Martínez Board Certified in Sleep Medicine  [facility]ified [facility]  Board Certified in Neurology [facility] Sleep [facility]  [address]   [address]  [place]  [address]  P- [phone]   [facility]   [address]  [address]   [place], [place] [address]           [facility] [place] [facility]  [address] [facility] [address] [address] [phone]    Subjective:     Patient ID: Davis Payton is a 55 y.o. male.    Chief Complaint   Patient presents with    [facility] Care    Sleep Apnea       HPI:        Davis Payton is a 55 y.o. male referred by  for OSCAR. Patient  was diagnosed with  obstructive sleep apnea about 12 years ago, currently of PAP machine  auto set (IPAP 25 , EPAP 8, PS 2 and 7) CMWP, last sleep study was 12 years ago, last PAP titration about 7 years ago.  Patient is using the PAP machine  in total average of 4.8 hours a night, more than 4 hours a night about 90 % in the last 30 days data, the AHI is 2.0/hr. This patient has lost about 90 pounds since last PAP titration.  Uses [employer] dreamwear.   SLEEP SCHEDULE: Goes to bed around 10 PM in the weekdays and 11 PM in the weekends. It usually takes the patient few minutes to fall asleep. The patient gets up 1 per night to go to the bathroom. The Patient finally gets up at 5 AM during the weekdays and 5 AM in the weekends. patient wakes up with dry mouth.  The patient has restless sleep with frequent arousals in addition to the Patient has significant daytime sleepiness. The Patient scored Bellvue Sleepiness Score: (Patient-Rptd) 8 on Bellvue Sleepiness Scale ( more than 10 is indicative of daytime sleepiness)and 43 in fatigue scale ( more than 36 is indicative of daytime fatigue). The patient takes 2 naps/week for 60 minutes and usually is not refreshing nap.     Previous evaluation and treatment has included- PSG

## 2025-05-06 ENCOUNTER — APPOINTMENT (OUTPATIENT)
Dept: CT IMAGING | Age: 55
End: 2025-05-06
Payer: COMMERCIAL

## 2025-05-06 ENCOUNTER — HOSPITAL ENCOUNTER (EMERGENCY)
Age: 55
Discharge: HOME | End: 2025-05-07
Attending: EMERGENCY MEDICINE
Payer: COMMERCIAL

## 2025-05-06 VITALS
HEIGHT: 71 IN | DIASTOLIC BLOOD PRESSURE: 59 MMHG | SYSTOLIC BLOOD PRESSURE: 116 MMHG | HEART RATE: 67 BPM | RESPIRATION RATE: 16 BRPM | OXYGEN SATURATION: 97 % | WEIGHT: 191.58 LBS | TEMPERATURE: 98 F | BODY MASS INDEX: 26.82 KG/M2

## 2025-05-06 DIAGNOSIS — L02.211 ABDOMINAL WALL ABSCESS: ICD-10-CM

## 2025-05-06 DIAGNOSIS — R10.33 PERIUMBILICAL ABDOMINAL PAIN: Primary | ICD-10-CM

## 2025-05-06 LAB
ANION GAP SERPL CALCULATED.3IONS-SCNC: 8 MMOL/L (ref 3–16)
BASOPHILS # BLD: 0.1 K/UL (ref 0–0.2)
BASOPHILS NFR BLD: 1.9 %
BILIRUB UR QL STRIP.AUTO: NEGATIVE
BUN SERPL-MCNC: 10 MG/DL (ref 7–20)
CALCIUM SERPL-MCNC: 8.7 MG/DL (ref 8.3–10.6)
CHLORIDE SERPL-SCNC: 106 MMOL/L (ref 99–110)
CLARITY UR: CLEAR
CO2 SERPL-SCNC: 25 MMOL/L (ref 21–32)
COLOR UR: YELLOW
CREAT SERPL-MCNC: 1 MG/DL (ref 0.9–1.3)
DEPRECATED RDW RBC AUTO: 17.7 % (ref 12.4–15.4)
EOSINOPHIL # BLD: 0.4 K/UL (ref 0–0.6)
EOSINOPHIL NFR BLD: 5.5 %
GFR SERPLBLD CREATININE-BSD FMLA CKD-EPI: 89 ML/MIN/{1.73_M2}
GLUCOSE SERPL-MCNC: 92 MG/DL (ref 70–99)
GLUCOSE UR STRIP.AUTO-MCNC: NEGATIVE MG/DL
HCT VFR BLD AUTO: 35 % (ref 40.5–52.5)
HGB BLD-MCNC: 11.4 G/DL (ref 13.5–17.5)
HGB UR QL STRIP.AUTO: NEGATIVE
KETONES UR STRIP.AUTO-MCNC: NEGATIVE MG/DL
LEUKOCYTE ESTERASE UR QL STRIP.AUTO: NEGATIVE
LYMPHOCYTES # BLD: 2.4 K/UL (ref 1–5.1)
LYMPHOCYTES NFR BLD: 34.2 %
MCH RBC QN AUTO: 27.8 PG (ref 26–34)
MCHC RBC AUTO-ENTMCNC: 32.6 G/DL (ref 31–36)
MCV RBC AUTO: 85.4 FL (ref 80–100)
MONOCYTES # BLD: 0.5 K/UL (ref 0–1.3)
MONOCYTES NFR BLD: 7.2 %
NEUTROPHILS # BLD: 3.6 K/UL (ref 1.7–7.7)
NEUTROPHILS NFR BLD: 51.2 %
NITRITE UR QL STRIP.AUTO: NEGATIVE
PH UR STRIP.AUTO: 5.5 [PH] (ref 5–8)
PLATELET # BLD AUTO: 291 K/UL (ref 135–450)
PLATELET BLD QL SMEAR: ADEQUATE
PMV BLD AUTO: 8.9 FL (ref 5–10.5)
POTASSIUM SERPL-SCNC: 4.5 MMOL/L (ref 3.5–5.1)
PROT UR STRIP.AUTO-MCNC: NEGATIVE MG/DL
RBC # BLD AUTO: 4.09 M/UL (ref 4.2–5.9)
SLIDE REVIEW: ABNORMAL
SODIUM SERPL-SCNC: 139 MMOL/L (ref 136–145)
SP GR UR STRIP.AUTO: 1.01 (ref 1–1.03)
UA COMPLETE W REFLEX CULTURE PNL UR: NORMAL
UA DIPSTICK W REFLEX MICRO PNL UR: NORMAL
URN SPEC COLLECT METH UR: NORMAL
UROBILINOGEN UR STRIP-ACNC: 1 E.U./DL
WBC # BLD AUTO: 7 K/UL (ref 4–11)

## 2025-05-06 PROCEDURE — 99285 EMERGENCY DEPT VISIT HI MDM: CPT

## 2025-05-06 PROCEDURE — 6360000004 HC RX CONTRAST MEDICATION: Performed by: EMERGENCY MEDICINE

## 2025-05-06 PROCEDURE — 85025 COMPLETE CBC W/AUTO DIFF WBC: CPT

## 2025-05-06 PROCEDURE — 96375 TX/PRO/DX INJ NEW DRUG ADDON: CPT

## 2025-05-06 PROCEDURE — 81003 URINALYSIS AUTO W/O SCOPE: CPT

## 2025-05-06 PROCEDURE — 80048 BASIC METABOLIC PNL TOTAL CA: CPT

## 2025-05-06 PROCEDURE — 74177 CT ABD & PELVIS W/CONTRAST: CPT

## 2025-05-06 PROCEDURE — 36415 COLL VENOUS BLD VENIPUNCTURE: CPT

## 2025-05-06 PROCEDURE — 6370000000 HC RX 637 (ALT 250 FOR IP)

## 2025-05-06 PROCEDURE — 6360000002 HC RX W HCPCS

## 2025-05-06 RX ORDER — KETOROLAC TROMETHAMINE 15 MG/ML
15 INJECTION, SOLUTION INTRAMUSCULAR; INTRAVENOUS ONCE
Status: COMPLETED | OUTPATIENT
Start: 2025-05-06 | End: 2025-05-06

## 2025-05-06 RX ORDER — ONDANSETRON 4 MG/1
4 TABLET, ORALLY DISINTEGRATING ORAL ONCE
Status: COMPLETED | OUTPATIENT
Start: 2025-05-06 | End: 2025-05-06

## 2025-05-06 RX ORDER — IOPAMIDOL 755 MG/ML
75 INJECTION, SOLUTION INTRAVASCULAR
Status: COMPLETED | OUTPATIENT
Start: 2025-05-06 | End: 2025-05-06

## 2025-05-06 RX ADMIN — IOPAMIDOL 75 ML: 755 INJECTION, SOLUTION INTRAVENOUS at 22:47

## 2025-05-06 RX ADMIN — KETOROLAC TROMETHAMINE 15 MG: 15 INJECTION, SOLUTION INTRAMUSCULAR; INTRAVENOUS at 21:33

## 2025-05-06 RX ADMIN — ONDANSETRON 4 MG: 4 TABLET, ORALLY DISINTEGRATING ORAL at 21:29

## 2025-05-06 ASSESSMENT — PAIN DESCRIPTION - ORIENTATION: ORIENTATION: MID;LOWER

## 2025-05-06 ASSESSMENT — PAIN DESCRIPTION - DESCRIPTORS: DESCRIPTORS: SORE;TENDER

## 2025-05-06 ASSESSMENT — PAIN SCALES - GENERAL: PAINLEVEL_OUTOF10: 7

## 2025-05-06 ASSESSMENT — PAIN DESCRIPTION - LOCATION: LOCATION: ABDOMEN

## 2025-05-07 PROCEDURE — 87077 CULTURE AEROBIC IDENTIFY: CPT

## 2025-05-07 PROCEDURE — 10060 I&D ABSCESS SIMPLE/SINGLE: CPT

## 2025-05-07 PROCEDURE — 87070 CULTURE OTHR SPECIMN AEROBIC: CPT

## 2025-05-07 PROCEDURE — 96365 THER/PROPH/DIAG IV INF INIT: CPT

## 2025-05-07 PROCEDURE — 6370000000 HC RX 637 (ALT 250 FOR IP): Performed by: EMERGENCY MEDICINE

## 2025-05-07 PROCEDURE — 87205 SMEAR GRAM STAIN: CPT

## 2025-05-07 PROCEDURE — 6360000002 HC RX W HCPCS: Performed by: EMERGENCY MEDICINE

## 2025-05-07 PROCEDURE — 87186 SC STD MICRODIL/AGAR DIL: CPT

## 2025-05-07 RX ORDER — CLINDAMYCIN HYDROCHLORIDE 300 MG/1
300 CAPSULE ORAL 3 TIMES DAILY
Qty: 30 CAPSULE | Refills: 0 | Status: SHIPPED | OUTPATIENT
Start: 2025-05-07 | End: 2025-05-17

## 2025-05-07 RX ORDER — CLINDAMYCIN PHOSPHATE 600 MG/50ML
600 INJECTION, SOLUTION INTRAVENOUS ONCE
Status: COMPLETED | OUTPATIENT
Start: 2025-05-07 | End: 2025-05-07

## 2025-05-07 RX ORDER — OXYCODONE AND ACETAMINOPHEN 5; 325 MG/1; MG/1
1 TABLET ORAL EVERY 6 HOURS PRN
Qty: 12 TABLET | Refills: 0 | Status: SHIPPED | OUTPATIENT
Start: 2025-05-07 | End: 2025-05-08

## 2025-05-07 RX ORDER — OXYCODONE AND ACETAMINOPHEN 5; 325 MG/1; MG/1
1 TABLET ORAL ONCE
Refills: 0 | Status: COMPLETED | OUTPATIENT
Start: 2025-05-07 | End: 2025-05-07

## 2025-05-07 RX ADMIN — CLINDAMYCIN PHOSPHATE 600 MG: 600 INJECTION, SOLUTION INTRAVENOUS at 00:57

## 2025-05-07 RX ADMIN — OXYCODONE AND ACETAMINOPHEN 1 TABLET: 5; 325 TABLET ORAL at 00:51

## 2025-05-07 ASSESSMENT — PAIN DESCRIPTION - LOCATION: LOCATION: ABDOMEN

## 2025-05-07 ASSESSMENT — PAIN DESCRIPTION - ORIENTATION: ORIENTATION: MID;LOWER

## 2025-05-07 ASSESSMENT — PAIN SCALES - GENERAL: PAINLEVEL_OUTOF10: 7

## 2025-05-07 NOTE — DISCHARGE INSTRUCTIONS
You have been administered medication(s) (hydromorphone) in the emergency department that may cause drowsiness. Do not be driving, operating heavy machinery, swimming, caring for small children, or engaging in dangerous activities of any type until these medications have worn off. This may be as long as 6-8 hours.    You have been prescribed medication(s) (oxycodone) that may cause drowsiness. Do not be driving, operating heavy machinery, swimming, caring for small children, or engaging in dangerous activities of any type until these medications have worn off. This may be as long as 6-8 hours.      Do not take Tylenol (acetaminophen) with these medications. Tylenol is a component of this medication and this may cause an overdose of acetaminophen.

## 2025-05-07 NOTE — ED NOTES
Provider order placed for patient's discharge. Provider reviewed decision to discharge with the patient. Discharge paperwork and any prescriptions were reviewed with the patient. Patient verbalized understanding of discharge education and any prescriptions and has no further questions or further needs at this time. Patient left with all personal belongings and was stable upon departure. Patient thanked for choosing [facility] and informed to return should any need arise.

## 2025-05-07 NOTE — ED PROVIDER NOTES
[facility] EMERGENCY DEPARTMENT  EMERGENCY DEPARTMENT ENCOUNTER        Pt Name: Davis Payton  MRN: [id number]  Birthdate 1970  Date of evaluation: 5/6/2025  Provider: Annita Colorado PA-C  PCP: David Lopez MD  Note Started: 8:34 PM EDT 5/6/25       I have seen and evaluated this patient with my supervising physician Wei Campa DO.      CHIEF COMPLAINT       Chief Complaint   Patient presents with    Abscess    Abdominal Pain     Pt presents o ED with a c/o abscess to mid lower abdomen. Pt states he noticed the area was more purple and hard this morning, but as the day has gone on thee area got softer and more painful. Pt denies fevers or chills. Pt reprots hx of ventral hernia repair in 2016, repair of recurrent incisional hernia mesh, and gastric bypass surgery. Pt reporting nausea but denies emesis. Pt reports not having a BM for 4 days, but today had a large BM this AM. Pt denies blood in stool.        HISTORY OF PRESENT ILLNESS: 1 or more Elements     History From: Patient      Davis Payton is a 55 y.o. male who presents to the ED with a chief complaint of a hard, purpleish mass located just underneath his umbilicus that he noticed this morning.  Reports the area is very tender to touch.  Since this morning, reports that the area has become increasingly more painful, large.  He does have some associated nausea without vomiting.  He has not eaten anything today.  He did have 1 episode of diarrhea this morning, without blood.  Denies recent fevers, chills.  Denies purulent drainage from the site.  Denies urinary symptoms.  He is s/p multiple abdominal surgeries including gastric bypass surgery, hernia repair in 2016, repair of recurrent incisional hernia mesh.  He is on Plavix from a stroke in 2014    Nursing Notes were all reviewed and agreed with or any disagreements were addressed in the HPI.    REVIEW OF SYSTEMS :      Review of Systems    Positives and

## 2025-05-07 NOTE — ED PROVIDER NOTES
[facility] EMERGENCY DEPARTMENT  EMERGENCY DEPARTMENT ENCOUNTER      Pt Name: Davis Payton  MRN: [id number]  Birthdate 1970  Date of evaluation: 5/6/2025  Provider: MARÍA FIGUEROA DO    CHIEF COMPLAINT  Chief Complaint   Patient presents with    Abscess    Abdominal Pain     Pt presents o ED with a c/o abscess to mid lower abdomen. Pt states he noticed the area was more purple and hard this morning, but as the day has gone on thee area got softer and more painful. Pt denies fevers or chills. Pt reprots hx of ventral hernia repair in 2016, repair of recurrent incisional hernia mesh, and gastric bypass surgery. Pt reporting nausea but denies emesis. Pt reports not having a BM for 4 days, but today had a large BM this AM. Pt denies blood in stool.        I have fully participated in the care of Davis Payton and have had a face-to-face evaluation. I have reviewed and agree with all pertinent clinical information, and midlevel provider's history, and physical exam. I have also reviewed the labs and imaging studies and treatment plan. I have also reviewed and agree with the medications, allergies and past medical history section for this Davis Payton. I agree with the diagnosis, and I concur.    I personally saw the patient and made/approved the management plan and take responsibility for the patient management.      This patient is at risk for a communicable infection.  Therefore, personal protection equipment consisting of a mask was worn for the exam.    Past Medical History:   Diagnosis Date    Alcoholism (HCC)     last drink 2015    Allergic rhinitis, seasonal     Anemia     Arthritis     right knee    Vaughn's esophagus     Benign intracranial hypertension     Cancer (HCC)     left kidney    Carpal tunnel syndrome     Chronic low back pain     Chronic pain syndrome     Depression     Ear infection 05/06/2022    GERD (gastroesophageal reflux disease)     Headache(784.0)     History of

## 2025-05-07 NOTE — ED PROVIDER NOTES
Incision/Drainage    Date/Time: 5/7/2025 12:48 AM    Performed by: Lu Davis PA  Authorized by: Wei Campa DO    Consent:     Consent obtained:  Verbal    Consent given by:  Patient    Risks discussed:  Bleeding, pain and infection  Universal protocol:     Procedure explained and questions answered to patient or proxy's satisfaction: yes      Imaging studies available: yes      Patient identity confirmed:  Verbally with patient  Location:     Type:  Abscess    Location:  Trunk    Trunk location:  Abdomen  Pre-procedure details:     Skin preparation:  Chlorhexidine  Sedation:     Sedation type:  None  Anesthesia:     Anesthesia method:  Local infiltration    Local anesthetic:  Lidocaine 1% WITH epi  Procedure type:     Complexity:  Simple  Procedure details:     Incision types:  Stab incision    Incision depth:  Subcutaneous    Wound management:  Probed and deloculated    Drainage:  Serosanguinous and purulent    Drainage amount:  Moderate    Wound treatment:  Wound left open    Packing materials:  None  Post-procedure details:     Procedure completion:  Tolerated well, no immediate complications         Lu Davis PA  05/07/25 0146

## 2025-05-08 ENCOUNTER — OFFICE VISIT (OUTPATIENT)
Dept: FAMILY MEDICINE CLINIC | Age: 55
End: 2025-05-08

## 2025-05-08 VITALS
HEART RATE: 64 BPM | WEIGHT: 187 LBS | DIASTOLIC BLOOD PRESSURE: 74 MMHG | SYSTOLIC BLOOD PRESSURE: 120 MMHG | TEMPERATURE: 98 F | OXYGEN SATURATION: 93 % | RESPIRATION RATE: 14 BRPM | BODY MASS INDEX: 26.08 KG/M2

## 2025-05-08 DIAGNOSIS — L02.211 ABSCESS OF ABDOMINAL WALL: ICD-10-CM

## 2025-05-08 DIAGNOSIS — M65.30 TRIGGER FINGER OF LEFT HAND, UNSPECIFIED FINGER: Primary | ICD-10-CM

## 2025-05-08 NOTE — PROGRESS NOTES
Physician Progress Note      PATIENT:               SHIN SALINAS  CSN #:                  [id number]  :                       1970  ADMIT DATE:       2025 2:27 PM  DISCH DATE:        2025 1:07 PM  RESPONDING  PROVIDER #:        Dorian Booth DO          QUERY TEXT:    Please clarify the patient?s nutritional status:    The clinical indicators include:  Hx- chronic back pain, GERD with Vaughn's esophagitis, anxiety, depression,   gastric bypass surgery and multiple episodes of small bowel obstruction,   abdominal wall abscesses status post I&D    Per Dietician note on 25-Malnutrition Status:  Moderate malnutrition   (25 1508) , Energy Intake:  Mild decrease in energy intake, Weight Loss:    No weight loss , Body Fat Loss:  Mild body fat loss Buccal region, Muscle   Mass Loss:  Mild muscle mass loss Temples (temporalis), Fluid Accumulation:    No fluid accumulation    Resume nutrition when appropriate, Monitor wt trend  Options provided:  -- Protein calorie malnutrition moderate  -- Other - I will add my own diagnosis  -- Disagree - Not applicable / Not valid  -- Disagree - Clinically unable to determine / Unknown  -- Refer to Clinical Documentation Reviewer    PROVIDER RESPONSE TEXT:    This patient has moderate protein calorie malnutrition.    Query created by: Destiny Apodaca on 2025 11:14 AM      Electronically signed by:  Dorian Booth DO 2025 11:20 AM

## 2025-05-08 NOTE — PROGRESS NOTES
2025    Blood pressure 120/74, pulse 64, temperature 98 °F (36.7 °C), resp. rate 14, weight 84.8 kg (187 lb), SpO2 93%.    Davis Payton (:  1970) is a 55 y.o. male, here for evaluation of the following medical concerns:    Chief Complaint   Patient presents with    Hand Pain     Joint pain in Rt hand, Trigger finger in Lt hand     He plans to see DR Middleton for a recently flared up abdominal wall abscess for which he inteds to follow up with dr Middleton about (this has been recurrent, needing surgery in ).  Some viscousy-clear' drainage- not particularly malodorous. 'sout milk' smell.  No f/c  Started on clinda in the ER. Culture obtained- still pending.    He has noted trigger finger left hand staring a month ago  Left hand 2nd and 3rd fingers stick and are stiff and snap    For past week all joints feel tight in both hands now, wrists also.    Worsened first thing in the morning and at the end of the day.    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  Not worse the next day after playing a gig.  But has not done a lot of keyboard playing every day.    Using ice.    Has hx of diffuse OA- knees, back.  Not usually hands.    No numbness/tingling.    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    Chronic pain syndrome- abdominal and head    History of stroke: right insular cortex on 2014 (small PFO closed by Dr Majano; hypergoag w/u neg,  neurology)    Gastroesophageal reflux disease with esophagitis--on daily ppi    Intractable chronic migraine without aura and with status migrainosus    Iron deficiency anemia    B12 deficiency    History of renal cell cancer- 2018, DR Napoles, robyn

## 2025-05-09 ENCOUNTER — RESULTS FOLLOW-UP (OUTPATIENT)
Dept: EMERGENCY DEPT | Age: 55
End: 2025-05-09

## 2025-05-09 LAB
BACTERIA SPEC AEROBE CULT: ABNORMAL
GRAM STN SPEC: ABNORMAL
ORGANISM: ABNORMAL

## 2025-05-09 NOTE — RESULT ENCOUNTER NOTE
Patient's positive result has been appropriately evaluated by the provider pool.   Patient was contacted and notified of the change in treatment plan.    Medication was phoned to the patient's pharmacy per protocol:    Pharmacy:   [id number] - [place], [place] - [address] - P [phone] - F [phone] [address] [address]  Phone: [phone] Fax: [phone]    Called and spoke to pt and called pharmacy above

## 2025-05-12 ENCOUNTER — PREP FOR PROCEDURE (OUTPATIENT)
Dept: SURGERY | Age: 55
End: 2025-05-12

## 2025-05-12 ENCOUNTER — HOSPITAL ENCOUNTER (OUTPATIENT)
Dept: SLEEP CENTER | Age: 55
Discharge: HOME | End: 2025-05-12
Attending: PSYCHIATRY & NEUROLOGY
Payer: COMMERCIAL

## 2025-05-12 ENCOUNTER — OFFICE VISIT (OUTPATIENT)
Dept: SURGERY | Age: 55
End: 2025-05-12
Payer: COMMERCIAL

## 2025-05-12 VITALS — BODY MASS INDEX: 26.18 KG/M2 | HEIGHT: 71 IN | WEIGHT: 187 LBS

## 2025-05-12 DIAGNOSIS — L02.211 ABSCESS OF ABDOMINAL WALL: ICD-10-CM

## 2025-05-12 DIAGNOSIS — G47.33 OSA (OBSTRUCTIVE SLEEP APNEA): ICD-10-CM

## 2025-05-12 DIAGNOSIS — L02.211 ABSCESS OF ABDOMINAL WALL: Primary | ICD-10-CM

## 2025-05-12 PROCEDURE — 95806 SLEEP STUDY UNATT&RESP EFFT: CPT

## 2025-05-12 PROCEDURE — 99213 OFFICE O/P EST LOW 20 MIN: CPT | Performed by: SURGERY

## 2025-05-12 RX ORDER — SULFAMETHOXAZOLE AND TRIMETHOPRIM 800; 160 MG/1; MG/1
1 TABLET ORAL 2 TIMES DAILY
COMMUNITY
Start: 2025-05-09

## 2025-05-12 RX ORDER — SODIUM CHLORIDE 0.9 % (FLUSH) 0.9 %
5-40 SYRINGE (ML) INJECTION EVERY 12 HOURS SCHEDULED
Status: CANCELLED | OUTPATIENT
Start: 2025-05-12

## 2025-05-12 RX ORDER — SENNOSIDES A AND B 8.6 MG/1
1 TABLET, FILM COATED ORAL NIGHTLY
COMMUNITY

## 2025-05-12 RX ORDER — SODIUM CHLORIDE 9 MG/ML
INJECTION, SOLUTION INTRAVENOUS PRN
Status: CANCELLED | OUTPATIENT
Start: 2025-05-12

## 2025-05-12 RX ORDER — SODIUM CHLORIDE 0.9 % (FLUSH) 0.9 %
5-40 SYRINGE (ML) INJECTION PRN
Status: CANCELLED | OUTPATIENT
Start: 2025-05-12

## 2025-05-12 ASSESSMENT — ENCOUNTER SYMPTOMS: COLOR CHANGE: 1

## 2025-05-12 NOTE — PROGRESS NOTES
[facility] PRE-OPERATIVE INSTRUCTIONS    Day of Procedure:5/16/25                Arrival time:   0850             Surgery time:1020    Take the following medications with a sip of water:  Follow your MD/Surgeons pre-procedure instructions regarding your medications     Do not eat or drink anything after 12:00 midnight prior to your surgery.  This includes water, chewing gum, mints and ice chips.   You may brush your teeth and gargle the morning of your surgery, but do not swallow the water.     Please see your family doctor/pediatrician for a history and physical and/or concerning medications.   Bring any test results/reports from your physicians office.   If you are under the care of a heart doctor or specialist doctor, please be aware that you may be asked to see them for clearance.    You may be asked to stop blood thinners such as Coumadin, Plavix, Fragmin, Lovenox, etc., or any anti-inflammatories such as:  Aspirin, Ibuprofen, Advil, Naproxen prior to your surgery.    We also ask that you stop any over the counter medications such as fish oil, vitamin E, glucosamine, garlic, Multivitamins, COQ 10, etc.    We ask that you do not smoke 24 hours prior to surgery.  We ask that you do not  drink any alcoholic beverages 24 hours prior to surgery     You must make arrangements for a responsible adult to take you home after your surgery.    For your safety, you will not be allowed to leave alone or drive yourself home.  Your surgery will be cancelled if you do not have a ride home.     Also for your safety, you must have someone stay with you the first 24 hours after your surgery.     A parent or legal guardian must accompany a child scheduled for surgery and plan to stay at the hospital until the child is discharged.    Please do not bring other children with you.    For your comfort, please wear simple loose fitting clothing to the hospital.  Please do not bring valuables.    Do not wear any make-up on  your surgery.    C-Difficile admission screening and protocol:       * Admitted with diarrhea?                         [] YES    [x]  NO     *Prior history of C-Diff. In last 3 months? [] YES    [x]  NO     *Antibiotic use in the past 6-8 weeks?      [x]  NO    []  YES                 If yes, which ANTIBIOTIC AND REASON______     *Prior hospitalization or nursing home in the last month? []  YES    [x]  NO        SAFETY FIRST...call before you fall!!!

## 2025-05-12 NOTE — PROGRESS NOTES
Davis Payton (:  1970) is a 55 y.o. male,Established patient, here for evaluation of the following chief complaint(s):  Post-Op Check (ER follow up)         Assessment & Plan  Abscess of abdominal wall            Incision and drainage in OR    The risks, benefits and alternatives to the planned procedure were discussed. Patient expressed an understanding and is willing to proceed.         Subjective   HPI  Chief Complaint: abscess    Patient presents for evaluation of a recurring abscess. Patient reports symptoms of pain, swelling and drainage. Location of symptoms is midline of the abdominal wall. Symptoms were first noted last week but this is the third flare up in less than a year. Previous evaluation includes ER with CT showing abscess. I&D done but swelling and drainage persist. Patient has a history of hernia repair with mesh removal due to infection then repair of recurrent hernia. Concerned there may be infected mesh or possible small bowel fistula. Will plan following treatment: I&D with exploration in OR. May need mesh removal or formal exploratory laparotomy if fistula is confirmed.    CT scan imaging was independently reviewed by me today      Past Medical History:   Diagnosis Date    Alcoholism (HCC)     last drink     Allergic rhinitis, seasonal     Anemia     Arthritis     right knee    Vaughn's esophagus     Benign intracranial hypertension     Cancer (HCC)     left kidney    Carpal tunnel syndrome     Chronic low back pain     Chronic pain syndrome     Depression     Ear infection 2022    GERD (gastroesophageal reflux disease)     Headache(784.0)     History of blood transfusion         History of loop recorder     placed in  and removed in  d/t infection    History of spinal fracture     L1,L7,L8 fall T5,T8  - unknown     History of tobacco use     Quit 2014    Hyperlipidemia     Left wrist fracture     fall    Lumbar compression fracture

## 2025-05-15 ENCOUNTER — ANESTHESIA EVENT (OUTPATIENT)
Dept: OPERATING ROOM | Age: 55
End: 2025-05-15
Payer: COMMERCIAL

## 2025-05-16 ENCOUNTER — HOSPITAL ENCOUNTER (OUTPATIENT)
Age: 55
Setting detail: OUTPATIENT SURGERY
Discharge: HOME | End: 2025-05-16
Attending: SURGERY | Admitting: SURGERY
Payer: COMMERCIAL

## 2025-05-16 ENCOUNTER — ANESTHESIA (OUTPATIENT)
Dept: OPERATING ROOM | Age: 55
End: 2025-05-16
Payer: COMMERCIAL

## 2025-05-16 VITALS
HEART RATE: 73 BPM | DIASTOLIC BLOOD PRESSURE: 66 MMHG | BODY MASS INDEX: 25.6 KG/M2 | OXYGEN SATURATION: 98 % | RESPIRATION RATE: 16 BRPM | WEIGHT: 189 LBS | TEMPERATURE: 98.2 F | HEIGHT: 72 IN | SYSTOLIC BLOOD PRESSURE: 115 MMHG

## 2025-05-16 DIAGNOSIS — L02.211 ABSCESS OF ABDOMINAL WALL: ICD-10-CM

## 2025-05-16 LAB
REASON FOR REJECTION: NORMAL
REJECTED TEST: NORMAL

## 2025-05-16 PROCEDURE — 87102 FUNGUS ISOLATION CULTURE: CPT

## 2025-05-16 PROCEDURE — 6360000002 HC RX W HCPCS: Performed by: NURSE ANESTHETIST, CERTIFIED REGISTERED

## 2025-05-16 PROCEDURE — 3700000000 HC ANESTHESIA ATTENDED CARE: Performed by: SURGERY

## 2025-05-16 PROCEDURE — 3600000012 HC SURGERY LEVEL 2 ADDTL 15MIN: Performed by: SURGERY

## 2025-05-16 PROCEDURE — 3700000001 HC ADD 15 MINUTES (ANESTHESIA): Performed by: SURGERY

## 2025-05-16 PROCEDURE — 2709999900 HC NON-CHARGEABLE SUPPLY: Performed by: SURGERY

## 2025-05-16 PROCEDURE — 6360000002 HC RX W HCPCS: Performed by: SURGERY

## 2025-05-16 PROCEDURE — 11045 DBRDMT SUBQ TISS EACH ADDL: CPT | Performed by: SURGERY

## 2025-05-16 PROCEDURE — 7100000001 HC PACU RECOVERY - ADDTL 15 MIN: Performed by: SURGERY

## 2025-05-16 PROCEDURE — 88307 TISSUE EXAM BY PATHOLOGIST: CPT

## 2025-05-16 PROCEDURE — 2580000003 HC RX 258: Performed by: NURSE ANESTHETIST, CERTIFIED REGISTERED

## 2025-05-16 PROCEDURE — 6360000002 HC RX W HCPCS: Performed by: ANESTHESIOLOGY

## 2025-05-16 PROCEDURE — 87070 CULTURE OTHR SPECIMN AEROBIC: CPT

## 2025-05-16 PROCEDURE — 87205 SMEAR GRAM STAIN: CPT

## 2025-05-16 PROCEDURE — 3600000002 HC SURGERY LEVEL 2 BASE: Performed by: SURGERY

## 2025-05-16 PROCEDURE — 7100000010 HC PHASE II RECOVERY - FIRST 15 MIN: Performed by: SURGERY

## 2025-05-16 PROCEDURE — 2580000003 HC RX 258: Performed by: SURGERY

## 2025-05-16 PROCEDURE — 7100000000 HC PACU RECOVERY - FIRST 15 MIN: Performed by: SURGERY

## 2025-05-16 PROCEDURE — 7100000011 HC PHASE II RECOVERY - ADDTL 15 MIN: Performed by: SURGERY

## 2025-05-16 PROCEDURE — 11042 DBRDMT SUBQ TIS 1ST 20SQCM/<: CPT | Performed by: SURGERY

## 2025-05-16 PROCEDURE — 2500000003 HC RX 250 WO HCPCS: Performed by: SURGERY

## 2025-05-16 RX ORDER — MEPERIDINE HYDROCHLORIDE 25 MG/ML
12.5 INJECTION INTRAMUSCULAR; INTRAVENOUS; SUBCUTANEOUS EVERY 5 MIN PRN
Status: DISCONTINUED | OUTPATIENT
Start: 2025-05-16 | End: 2025-05-16 | Stop reason: HOSPADM

## 2025-05-16 RX ORDER — OXYCODONE AND ACETAMINOPHEN 5; 325 MG/1; MG/1
1 TABLET ORAL EVERY 6 HOURS PRN
Qty: 20 TABLET | Refills: 0 | Status: SHIPPED | OUTPATIENT
Start: 2025-05-16 | End: 2025-05-21

## 2025-05-16 RX ORDER — DEXAMETHASONE SODIUM PHOSPHATE 4 MG/ML
INJECTION, SOLUTION INTRA-ARTICULAR; INTRALESIONAL; INTRAMUSCULAR; INTRAVENOUS; SOFT TISSUE
Status: DISCONTINUED | OUTPATIENT
Start: 2025-05-16 | End: 2025-05-16 | Stop reason: SDUPTHER

## 2025-05-16 RX ORDER — SODIUM CHLORIDE 0.9 % (FLUSH) 0.9 %
5-40 SYRINGE (ML) INJECTION PRN
Status: DISCONTINUED | OUTPATIENT
Start: 2025-05-16 | End: 2025-05-16

## 2025-05-16 RX ORDER — SODIUM CHLORIDE 0.9 % (FLUSH) 0.9 %
5-40 SYRINGE (ML) INJECTION PRN
Status: DISCONTINUED | OUTPATIENT
Start: 2025-05-16 | End: 2025-05-16 | Stop reason: HOSPADM

## 2025-05-16 RX ORDER — ONDANSETRON 2 MG/ML
4 INJECTION INTRAMUSCULAR; INTRAVENOUS
Status: DISCONTINUED | OUTPATIENT
Start: 2025-05-16 | End: 2025-05-16 | Stop reason: HOSPADM

## 2025-05-16 RX ORDER — PROPOFOL 10 MG/ML
INJECTION, EMULSION INTRAVENOUS
Status: DISCONTINUED | OUTPATIENT
Start: 2025-05-16 | End: 2025-05-16 | Stop reason: SDUPTHER

## 2025-05-16 RX ORDER — BUPIVACAINE HYDROCHLORIDE 5 MG/ML
INJECTION, SOLUTION EPIDURAL; INTRACAUDAL; PERINEURAL
Status: DISCONTINUED | OUTPATIENT
Start: 2025-05-16 | End: 2025-05-16 | Stop reason: ALTCHOICE

## 2025-05-16 RX ORDER — SODIUM CHLORIDE 9 MG/ML
INJECTION, SOLUTION INTRAVENOUS PRN
Status: DISCONTINUED | OUTPATIENT
Start: 2025-05-16 | End: 2025-05-16

## 2025-05-16 RX ORDER — OXYCODONE HYDROCHLORIDE 5 MG/1
5 TABLET ORAL PRN
Status: DISCONTINUED | OUTPATIENT
Start: 2025-05-16 | End: 2025-05-16 | Stop reason: HOSPADM

## 2025-05-16 RX ORDER — ONDANSETRON 2 MG/ML
INJECTION INTRAMUSCULAR; INTRAVENOUS
Status: DISCONTINUED | OUTPATIENT
Start: 2025-05-16 | End: 2025-05-16 | Stop reason: SDUPTHER

## 2025-05-16 RX ORDER — SODIUM CHLORIDE 0.9 % (FLUSH) 0.9 %
5-40 SYRINGE (ML) INJECTION EVERY 12 HOURS SCHEDULED
Status: DISCONTINUED | OUTPATIENT
Start: 2025-05-16 | End: 2025-05-16

## 2025-05-16 RX ORDER — GLYCOPYRROLATE 0.2 MG/ML
INJECTION INTRAMUSCULAR; INTRAVENOUS
Status: DISCONTINUED | OUTPATIENT
Start: 2025-05-16 | End: 2025-05-16 | Stop reason: SDUPTHER

## 2025-05-16 RX ORDER — SODIUM CHLORIDE 9 MG/ML
INJECTION, SOLUTION INTRAVENOUS PRN
Status: DISCONTINUED | OUTPATIENT
Start: 2025-05-16 | End: 2025-05-16 | Stop reason: HOSPADM

## 2025-05-16 RX ORDER — SODIUM CHLORIDE 0.9 % (FLUSH) 0.9 %
5-40 SYRINGE (ML) INJECTION EVERY 12 HOURS SCHEDULED
Status: DISCONTINUED | OUTPATIENT
Start: 2025-05-16 | End: 2025-05-16 | Stop reason: HOSPADM

## 2025-05-16 RX ORDER — LIDOCAINE HYDROCHLORIDE 20 MG/ML
INJECTION, SOLUTION EPIDURAL; INFILTRATION; INTRACAUDAL; PERINEURAL
Status: DISCONTINUED | OUTPATIENT
Start: 2025-05-16 | End: 2025-05-16 | Stop reason: SDUPTHER

## 2025-05-16 RX ORDER — SODIUM CHLORIDE 9 MG/ML
INJECTION, SOLUTION INTRAVENOUS
Status: DISCONTINUED | OUTPATIENT
Start: 2025-05-16 | End: 2025-05-16 | Stop reason: SDUPTHER

## 2025-05-16 RX ORDER — MAGNESIUM HYDROXIDE 1200 MG/15ML
LIQUID ORAL CONTINUOUS PRN
Status: COMPLETED | OUTPATIENT
Start: 2025-05-16 | End: 2025-05-16

## 2025-05-16 RX ORDER — FENTANYL CITRATE 50 UG/ML
INJECTION, SOLUTION INTRAMUSCULAR; INTRAVENOUS
Status: DISCONTINUED | OUTPATIENT
Start: 2025-05-16 | End: 2025-05-16 | Stop reason: SDUPTHER

## 2025-05-16 RX ORDER — MIDAZOLAM HYDROCHLORIDE 1 MG/ML
INJECTION, SOLUTION INTRAMUSCULAR; INTRAVENOUS
Status: DISCONTINUED | OUTPATIENT
Start: 2025-05-16 | End: 2025-05-16 | Stop reason: SDUPTHER

## 2025-05-16 RX ORDER — NALOXONE HYDROCHLORIDE 0.4 MG/ML
INJECTION, SOLUTION INTRAMUSCULAR; INTRAVENOUS; SUBCUTANEOUS PRN
Status: DISCONTINUED | OUTPATIENT
Start: 2025-05-16 | End: 2025-05-16 | Stop reason: HOSPADM

## 2025-05-16 RX ORDER — OXYCODONE HYDROCHLORIDE 10 MG/1
10 TABLET ORAL PRN
Status: DISCONTINUED | OUTPATIENT
Start: 2025-05-16 | End: 2025-05-16 | Stop reason: HOSPADM

## 2025-05-16 RX ADMIN — FENTANYL CITRATE 100 MCG: 50 INJECTION INTRAMUSCULAR; INTRAVENOUS at 09:49

## 2025-05-16 RX ADMIN — SODIUM CHLORIDE: 9 INJECTION, SOLUTION INTRAVENOUS at 09:48

## 2025-05-16 RX ADMIN — HYDROMORPHONE HYDROCHLORIDE 0.5 MG: 1 INJECTION, SOLUTION INTRAMUSCULAR; INTRAVENOUS; SUBCUTANEOUS at 12:06

## 2025-05-16 RX ADMIN — PROPOFOL 50 MG: 10 INJECTION, EMULSION INTRAVENOUS at 09:54

## 2025-05-16 RX ADMIN — LIDOCAINE HYDROCHLORIDE 60 MG: 20 INJECTION, SOLUTION EPIDURAL; INFILTRATION; INTRACAUDAL; PERINEURAL at 09:52

## 2025-05-16 RX ADMIN — GLYCOPYRROLATE 0.1 MG: 0.2 INJECTION INTRAMUSCULAR; INTRAVENOUS at 09:56

## 2025-05-16 RX ADMIN — PROPOFOL 200 MG: 10 INJECTION, EMULSION INTRAVENOUS at 09:52

## 2025-05-16 RX ADMIN — ONDANSETRON 4 MG: 2 INJECTION, SOLUTION INTRAMUSCULAR; INTRAVENOUS at 09:56

## 2025-05-16 RX ADMIN — DEXAMETHASONE SODIUM PHOSPHATE 8 MG: 4 INJECTION, SOLUTION INTRAMUSCULAR; INTRAVENOUS at 09:56

## 2025-05-16 RX ADMIN — SODIUM CHLORIDE 2000 MG: 9 INJECTION, SOLUTION INTRAVENOUS at 09:48

## 2025-05-16 RX ADMIN — MIDAZOLAM 2 MG: 1 INJECTION INTRAMUSCULAR; INTRAVENOUS at 09:48

## 2025-05-16 ASSESSMENT — PAIN - FUNCTIONAL ASSESSMENT
PAIN_FUNCTIONAL_ASSESSMENT: 0-10
PAIN_FUNCTIONAL_ASSESSMENT: ACTIVITIES ARE NOT PREVENTED

## 2025-05-16 ASSESSMENT — PAIN DESCRIPTION - LOCATION
LOCATION: ABDOMEN

## 2025-05-16 ASSESSMENT — PAIN DESCRIPTION - DESCRIPTORS
DESCRIPTORS: ACHING
DESCRIPTORS: SORE

## 2025-05-16 ASSESSMENT — PAIN DESCRIPTION - ORIENTATION
ORIENTATION: MID

## 2025-05-16 ASSESSMENT — PAIN DESCRIPTION - ONSET
ONSET: ON-GOING

## 2025-05-16 ASSESSMENT — PAIN DESCRIPTION - PAIN TYPE
TYPE: SURGICAL PAIN
TYPE: ACUTE PAIN;SURGICAL PAIN;CHRONIC PAIN
TYPE: SURGICAL PAIN

## 2025-05-16 ASSESSMENT — LIFESTYLE VARIABLES: SMOKING_STATUS: 1

## 2025-05-16 ASSESSMENT — PAIN DESCRIPTION - FREQUENCY
FREQUENCY: CONTINUOUS

## 2025-05-16 ASSESSMENT — PAIN SCALES - GENERAL
PAINLEVEL_OUTOF10: 6
PAINLEVEL_OUTOF10: 7
PAINLEVEL_OUTOF10: 6

## 2025-05-16 ASSESSMENT — ENCOUNTER SYMPTOMS: SHORTNESS OF BREATH: 0

## 2025-05-16 NOTE — H&P
Preoperative History and Physical Update    H&P from 5/12/2025 was reviewed    No changes noted today    PE  Alert and oriented  Abscess abdominal wall    A/P  Recurring abscess abdominal wall  For incision and drainage today  The risks, benefits and alternatives to the planned procedure were discussed. Patient expressed an understanding and is willing to proceed.    Electronically signed by HELEN REYES MD on 5/16/2025 at 9:28 AM

## 2025-05-16 NOTE — PROGRESS NOTES
Pt states pain improved after Dilaudid. Vitals remain stable on room air. Oxycodone available for phase 2.

## 2025-05-16 NOTE — OP NOTE
[facility]          [address] [place] [address]                            OPERATIVE REPORT      PATIENT NAME: SHIN SALINAS              : 1970  MED REC NO: [id number]                      ROOM: OR  ACCOUNT NO: [id number]                       ADMIT DATE: 2025  PROVIDER: Kurtis Middleton MD      DATE OF PROCEDURE:  2025    SURGEON:  Kurtis Middleton MD    PREOPERATIVE DIAGNOSIS:  Recurring abscess, abdominal wall.    POSTOPERATIVE DIAGNOSIS:  Recurring abscess, abdominal wall.    PROCEDURE:  Drainage and excisional debridement of chronic abdominal wall abscess involving skin and subcutaneous tissue.  Wound dimensions, 7 x 6 x 1 cm.    SPECIMEN:  Cultures and debrided subcutaneous tissue and skin.    ESTIMATED BLOOD LOSS:  Less than 50 mL.    COMPLICATIONS:  None.    DISPOSITION:  To Recovery in stable condition.    INDICATION:  The patient is a 55-year-old, who has an extensive abdominal surgical history including hernia repair.  At one point, the mesh from an attempted hernia repair required excision, and then he underwent subsequent repair of the recurrent hernia.  Over the last 12-month period, he has been treated 3 times for an abdominal wall abscess in the periumbilical region.  There is concern for again possible mesh contamination or even possible bowel fistula.  He recently underwent incision and drainage in the emergency room with improvement, but not resolution of his abscess.  More aggressive exploration for debridement, drainage, and possible mesh removal were discussed.  He elects to proceed to the operating room today.    DESCRIPTION OF PROCEDURE:  The patient was brought to the operating room and placed supine.  General anesthesia performed and the abdomen prepped and draped in a sterile fashion.  There was an open abscess area in the lower midline just caudal to the umbilicus.  This was probed with a

## 2025-05-16 NOTE — PROGRESS NOTES
Dressing change supplies given. Pt states he knows how to do it as he has had this procedure several times.

## 2025-05-16 NOTE — ANESTHESIA POSTPROCEDURE EVALUATION
Department of Anesthesiology  Postprocedure Note    Patient: Davis Payton  MRN: [id number]  YOB: 1970  Date of evaluation: 5/16/2025    Procedure Summary       Date: 05/16/25 Room / Location: [address]    Anesthesia Start: 0948 Anesthesia Stop: 1036    Procedure: EXCISIONAL DEBRIDEMENT ABDOMEN WALL ABSCESS (Abdomen) Diagnosis:       Abscess of abdominal wall      (Abscess of abdominal wall [L02.211])    Surgeons: Kurtis Middleton MD Responsible Provider: Imer Jesus MD    Anesthesia Type: General ASA Status: 3            Anesthesia Type: General    Cande Phase I: Cande Score: 5    Cande Phase II:      Anesthesia Post Evaluation    Patient location during evaluation: PACU  Patient participation: complete - patient participated  Level of consciousness: awake and alert  Pain score: 4  Airway patency: patent  Nausea & Vomiting: no nausea and no vomiting  Cardiovascular status: blood pressure returned to baseline  Respiratory status: acceptable  Hydration status: euvolemic  Pain management: adequate    No notable events documented.

## 2025-05-16 NOTE — PROGRESS NOTES
Patient admitted to PACU # 4 from OR at 1033 post excisional debridement abdomen wall abscess per MD Middleton.  Attached to PACU monitoring system and report received from anesthesia provider.  Patient was reported to be hemodynamically stable during procedure.  Patient drowsy on admission, oral airway in place. Dressing is clean, dry, and intact. Drain under dressing per OR RN. Will continue to monitor.

## 2025-05-16 NOTE — DISCHARGE INSTRUCTIONS
Follow up on 5/22 for drain removal  Call [phone] for an appointment  Remove dressings in 3-4 days. Bloody drainage on the bandage is expected. Change as needed  Ok to shower in AM  No Driving for today. OK to drive starting 5/17 if you are not taking any pain medication.

## 2025-05-16 NOTE — ANESTHESIA PRE PROCEDURE
Department of Anesthesiology  Preprocedure Note       Name:  Davis Payton   Age:  55 y.o.  :  1970                                          MRN:  [id number]         Date:  2025      Surgeon: Surgeon(s):  Kurtis Middleton MD    Procedure: Procedure(s):  ABDOMEN WALL ABSCESS INCISION AND DRAINAGE    Medications prior to admission:   Prior to Admission medications    Medication Sig Start Date End Date Taking? Authorizing Provider   sulfamethoxazole-trimethoprim (BACTRIM DS;SEPTRA DS) 800-160 MG per tablet Take 1 tablet by mouth 2 times daily 25  Yes Leidy Bejarano MD   senna (SENOKOT) 8.6 MG tablet Take 1 tablet by mouth nightly   Yes Leidy Bejarano MD   traZODone (DESYREL) 100 MG tablet Take 2 tablets by mouth nightly 3/11/25  Yes David Lopez MD   pantoprazole (PROTONIX) 40 MG tablet Take 1 tablet by mouth 2 times daily 3/11/25  Yes David Lopez MD   celecoxib (CELEBREX) 200 MG capsule Take 1 capsule by mouth daily 24  Yes David Lopez MD   FLUoxetine (PROZAC) 20 MG capsule TAKE 1 CAPSULE BY MOUTH EVERY DAY 24  Yes David Lopez MD   atorvastatin (LIPITOR) 40 MG tablet TAKE 1 TABLET BY MOUTH EVERYDAY AT BEDTIME 24  Yes David Lopez MD   gabapentin (NEURONTIN) 600 MG tablet Take 1 tablet by mouth 4 times daily. 21  Yes Leidy Bejarano MD   Multiple Vitamin TABS Take 1 tablet by mouth daily    Yes Leidy Bejarano MD   clopidogrel (PLAVIX) 75 MG tablet Take 1 tablet by mouth daily 25   David Lopez MD   sildenafil (REVATIO) 20 MG tablet TAKE 4 TABLETS BY MOUTH AS NEEDED (30MIN PRIOR TO INTERCOURSE) 24   David Lopez MD       Current medications:    Current Facility-Administered Medications   Medication Dose Route Frequency Provider Last Rate Last Admin    sodium chloride flush 0.9 % injection 5-40 mL  5-40 mL IntraVENous 2 times per day Raymundo Gray MD        sodium

## 2025-05-18 LAB
BACTERIA SPEC AEROBE CULT: NORMAL
BACTERIA SPEC ANAEROBE CULT: NORMAL
GRAM STN SPEC: NORMAL
LOEFFLER MB STN SPEC: NORMAL

## 2025-05-19 ENCOUNTER — TELEPHONE (OUTPATIENT)
Dept: PULMONOLOGY | Age: 55
End: 2025-05-19

## 2025-05-19 NOTE — TELEPHONE ENCOUNTER
HST Sleep study showed mild OSCAR (on a scale of mild, moderate and severe).  AHI was 10.1  per hr. (Average times per hr breathing was obstructed).  O2 Desaturations to 86% (lowest o2)   Dr Recommends:    Follow up with the patient's sleep physician to discuss results      Avoid sedatives, alcohol and caffeinated drinks at bedtime.    Recommend:  contine BiPAP usage     [facility] Lab used: [facility]    Avoid driving while sleepy.          Pt notified of results    [facility](pt states they do take his ins)    Pt needs orderrs faxed to [facility]    He does not want to use [employer]

## 2025-05-19 NOTE — PROGRESS NOTES
Davis Payton         : 1970  [] MSC     [] [facility]      [x] [employer]     [][facility]    [] [employer]  [] [facility]   [] [facility])  [] [employer] [facility] [] [employer] [] [employer] [] Patient [facility] [] [facility] [] [facility]  Diagnosis: [x] OSCAR (G47.33) [] CSA (G47.31) [] Apnea (G47.30)   Length of Need: [] 12 Months [x] 99 Months [] Other:    Machine (ALFREDA!):  [x] ResMed AirSense     Auto [] Other:       Humidifier: [x] Heated ()        [x] Water chamber replacement ()/ 1 per 6 months        Mask:  Please always start with the mask the patient used during the titraion    [x] Full Face () /1 per 3 months    [x] Patient Choice - Size and fit mask    [] Dispense:     [x] Headgear () / 1 per 3 months    [x] Interface Replacement ()/1 per month            Tubing: [x] Heated ()/1 per 3 months    [] Standard ()/1 per 3 months [] Other:           Filters: [x] Non-disposable ()/1 per 6 months     [x] Ultra-Fine, Disposable ()/2 per month        Miscellaneous: [x] Chin Strap ()/ 1 per 6 months [] O2 bleed-in:       LPM   [] Oximetry on CPAP/Bilevel []  Other:          Start Order Date: 25    MEDICAL JUSTIFICATION:  I, the undersigned, certify that the above prescribed supplies are medically necessary for this patient’s wellbeing.  In my opinion, the supplies are both reasonable and necessary in reference to accepted standards of medicalpractice in treatment of this patient’s condition.    Prince Buckley MD      NPI: [id number]       Order Signed Date: 25    Electronically signed by Prince Buckley MD on 2025 at 11:22 AM

## 2025-05-21 LAB
BACTERIA SPEC AEROBE CULT: NORMAL
BACTERIA SPEC ANAEROBE CULT: NORMAL
GRAM STN SPEC: NORMAL

## 2025-05-22 ENCOUNTER — OFFICE VISIT (OUTPATIENT)
Dept: SURGERY | Age: 55
End: 2025-05-22

## 2025-05-22 VITALS — WEIGHT: 189 LBS | BODY MASS INDEX: 25.99 KG/M2 | SYSTOLIC BLOOD PRESSURE: 115 MMHG | DIASTOLIC BLOOD PRESSURE: 66 MMHG

## 2025-05-22 DIAGNOSIS — L02.211 ABSCESS OF ABDOMINAL WALL: Primary | ICD-10-CM

## 2025-05-22 PROCEDURE — 99024 POSTOP FOLLOW-UP VISIT: CPT | Performed by: SURGERY

## 2025-05-22 NOTE — PROGRESS NOTES
Davis Payton (:  1970) is a 55 y.o. 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  Wound dimensions, 7 x 6 x 1 cm. 25 - a little draining, sour odor, clear pink fluid, changing bandage regularly. Feeling low energy still. Back on Plavix. )         Assessment & Plan  Abscess of abdominal wall     Follow up in one week                Subjective   HPI  Patient presents s/p drainage of abdominal wall abscess. Patient is one week post op. Pain level is minor. Incision appearance: healing well. Penrose removed today. Post op complications: none. Pathology report reviewed with patient and showed negative cultures to date. Follow up in one week for suture removal.    Review of Systems       Objective   Physical Exam       Electronically signed by HELEN REYES MD on 2025 at 9:48 AM        An electronic signature was used to authenticate this note.    --HELEN REYES MD

## 2025-05-26 LAB
FUNGUS SPEC CULT: NORMAL
LOEFFLER MB STN SPEC: NORMAL

## 2025-05-28 NOTE — ED NOTES
Dr. Linda Joyner at in room, pt told staff that the only thing that works for is pain is ketamine IV he normally was getting 350mg every other month.       Anamaria Nicholson RN  05/28/20 0892
Pt d/c home with AVS no s.s of distress, pt denies questions about f/u care , pt states he is having a little pain relief 8/10 denies needed assistance out      Anna Pretty RN  05/28/20 3942
Walked pt from [facility] to ED bed. Obtained VS. Pt wearing mask, medic wearing mask, gloves, safety glasses, and face shield.      Jani Slade, EMT-P  05/28/20 8777
normal

## 2025-05-29 ENCOUNTER — OFFICE VISIT (OUTPATIENT)
Dept: SURGERY | Age: 55
End: 2025-05-29

## 2025-05-29 VITALS — WEIGHT: 189 LBS | BODY MASS INDEX: 26.46 KG/M2 | HEIGHT: 71 IN

## 2025-05-29 DIAGNOSIS — L02.211 ABSCESS OF ABDOMINAL WALL: Primary | ICD-10-CM

## 2025-05-29 PROCEDURE — 99024 POSTOP FOLLOW-UP VISIT: CPT | Performed by: SURGERY

## 2025-05-29 RX ORDER — OXYCODONE AND ACETAMINOPHEN 5; 325 MG/1; MG/1
1 TABLET ORAL EVERY 6 HOURS PRN
Qty: 20 TABLET | Refills: 0 | Status: SHIPPED | OUTPATIENT
Start: 2025-05-29 | End: 2025-06-03

## 2025-05-30 NOTE — PROGRESS NOTES
Davis Payton (:  1970) is a 55 y.o. male,Established patient, here for evaluation of the following chief complaint(s):  Post-Op Check (Suture removal)         Assessment & Plan  Abscess of abdominal wall       Orders:    oxyCODONE-acetaminophen (PERCOCET) 5-325 MG per tablet; Take 1 tablet by mouth every 6 hours as needed for Pain for up to 5 days. Intended supply: 5 days. Take lowest dose possible to manage pain Max Daily Amount: 4 tablets    Follow up in two weeks       Subjective   HPI  Doing well overall. Sutures out today. No complications. Follow up in two weeks    Review of Systems       Objective   Physical Exam       Electronically signed by HELEN REYES MD on 2025 at 3:57 PM        An electronic signature was used to authenticate this note.    --HELEN REYES MD

## 2025-05-30 NOTE — ASSESSMENT & PLAN NOTE
Orders:    oxyCODONE-acetaminophen (PERCOCET) 5-325 MG per tablet; Take 1 tablet by mouth every 6 hours as needed for Pain for up to 5 days. Intended supply: 5 days. Take lowest dose possible to manage pain Max Daily Amount: 4 tablets

## 2025-06-01 DIAGNOSIS — M47.819 ARTHRITIS OF SPINE: ICD-10-CM

## 2025-06-02 LAB
FUNGUS SPEC CULT: NORMAL
LOEFFLER MB STN SPEC: NORMAL

## 2025-06-02 RX ORDER — CELECOXIB 200 MG/1
CAPSULE ORAL DAILY
Qty: 30 CAPSULE | Refills: 2 | Status: SHIPPED | OUTPATIENT
Start: 2025-06-02

## 2025-06-09 LAB
FUNGUS SPEC CULT: NORMAL
LOEFFLER MB STN SPEC: NORMAL

## 2025-06-12 ENCOUNTER — OFFICE VISIT (OUTPATIENT)
Dept: SURGERY | Age: 55
End: 2025-06-12

## 2025-06-12 VITALS — HEIGHT: 71 IN | WEIGHT: 189 LBS | BODY MASS INDEX: 26.46 KG/M2

## 2025-06-12 DIAGNOSIS — L02.211 ABSCESS OF ABDOMINAL WALL: Primary | ICD-10-CM

## 2025-06-12 PROCEDURE — 99024 POSTOP FOLLOW-UP VISIT: CPT | Performed by: SURGERY

## 2025-06-12 NOTE — PROGRESS NOTES
Davis Payton (:  1970) is a 55 y.o. male,Established patient, here for evaluation of the following chief complaint(s):  Post-Op Check         Assessment & Plan  Abscess of abdominal wall     Follow up with me as needed                  Subjective   HPI  Doing well overall. Appears to have healed. Minimal drainage at times. No pain. Follow up with me as needed    Review of Systems       Objective   Physical Exam       Electronically signed by HELEN REYES MD on 2025 at 9:27 AM        An electronic signature was used to authenticate this note.    --HELEN REYES MD

## 2025-06-16 LAB
FUNGUS SPEC CULT: NORMAL
LOEFFLER MB STN SPEC: NORMAL

## 2025-06-23 ENCOUNTER — HOSPITAL ENCOUNTER (EMERGENCY)
Age: 55
Discharge: HOME | End: 2025-06-24
Attending: STUDENT IN AN ORGANIZED HEALTH CARE EDUCATION/TRAINING PROGRAM
Payer: COMMERCIAL

## 2025-06-23 VITALS
DIASTOLIC BLOOD PRESSURE: 74 MMHG | HEIGHT: 71 IN | WEIGHT: 181.88 LBS | RESPIRATION RATE: 15 BRPM | SYSTOLIC BLOOD PRESSURE: 114 MMHG | HEART RATE: 73 BPM | TEMPERATURE: 98.1 F | OXYGEN SATURATION: 97 % | BODY MASS INDEX: 25.46 KG/M2

## 2025-06-23 DIAGNOSIS — S61.211A LACERATION OF LEFT INDEX FINGER WITHOUT FOREIGN BODY WITHOUT DAMAGE TO NAIL, INITIAL ENCOUNTER: Primary | ICD-10-CM

## 2025-06-23 PROCEDURE — 99282 EMERGENCY DEPT VISIT SF MDM: CPT

## 2025-06-23 PROCEDURE — 12001 RPR S/N/AX/GEN/TRNK 2.5CM/<: CPT

## 2025-06-23 ASSESSMENT — PAIN - FUNCTIONAL ASSESSMENT: PAIN_FUNCTIONAL_ASSESSMENT: NONE - DENIES PAIN

## 2025-06-24 NOTE — ED NOTES

## 2025-06-24 NOTE — DISCHARGE INSTRUCTIONS
Follow up in 5-7 days to have your stitches evaluated for removal.  This can be done by your primary care doctor, urgent care, or the ER.  Keep them dry and open or covered with a clean bandage for the first 24 hours.  After that, it is okay to let soapy water run over them in the shower or bath, but do not scrub directly over them.  You can take over-the-counter medications such as Tylenol and Motrin as needed for pain.  If you have worsening or concerning symptoms, including but not limited to signs of infection such as significant redness spreading from around the wound or pus draining from the wound, please come back to the ER immediately.

## 2025-06-24 NOTE — PROGRESS NOTES
Davis BAILEY Tata         : 1970  [] MSC     [] [facility]      [x] Bern     [][facility]ica's    [] [facility]  [] [facility]   [] [facility]  [] [employer] [facility] [] [employer] [] [employer] [] Patient A[facility] [] [employer] [] [facility]  Diagnosis: [x] OSCAR (G47.33) [] CSA (G47.31) [] Apnea (G47.30)   Length of Need: [] 12 Months [x] 99 Months [] Other:    Machine (ALFREDA!):  [x] ResMed AirSense     Auto [] Other:     []  CPAP () [x] Bilevel ()   Mode: [] Auto [] Spontaneous    Mode: [x] Auto [] Spontaneous                       IPAP 25 , EPAP 8   PS 5     Comfort Settings:       - Ramp Pressure: 7 cmH2O                                        - Ramp time: 15 min                                     -  Flex/EPR - 3 full time  If the order is for BiLevel:                                    - For ResMed Bilevel (TiMax-4 sec   TiMin- 0.2 sec)     Humidifier: [x] Heated ()        [x] Water chamber replacement ()/ 1 per 6 months        Mask:  Please always start with the mask the patient used during the titraion    [x] Full Face () /1 per 3 months    [x] Patient Choice - Size and fit mask    [] Dispense:     [x] Headgear () / 1 per 3 months    [x] Interface Replacement ()/1 per month            Tubing: [x] Heated ()/1 per 3 months    [] Standard ()/1 per 3 months [] Other:           Filters: [x] Non-disposable ()/1 per 6 months     [x] Ultra-Fine, Disposable ()/2 per month        Miscellaneous: [x] Chin Strap ()/ 1 per 6 months [] O2 bleed-in:       LPM   [] Oximetry on CPAP/Bilevel []  Other:          Start Order Date: 25    MEDICAL JUSTIFICATION:  I, the undersigned, certify that the above prescribed supplies are medically necessary for this patient’s wellbeing.  In my opinion, the supplies are both reasonable and necessary in reference to accepted standards of medicalpractice in treatment of this patient’s condition.    Prince

## 2025-06-24 NOTE — ED TRIAGE NOTES
Pt states that he cut his left index finger while cutting bread around 1800 today. Pt states that the bleeding is intermittent. Pt states that he is on blood thinners due to a previous stroke in 2014. Pt states that his tetanus is up to date. Pt denies numbness, chest pain, sob, headche, vision changes, N/V/D. Pt is A&Ox4, UAL, VSS on RA.

## 2025-06-24 NOTE — ED PROVIDER NOTES
or not returned as of this dictation.     RADIOLOGY:      Non-plain film images such as CT, Ultrasound and MRI are read by the radiologist. Plain radiographic images are visualized and preliminarily interpreted by the ED Provider with the below findings:   Interpretation per the Radiologist below, if available at the time of this note:  No orders to display         PROCEDURES   Laceration repair completed 6/24/25 at 0130.  Verbal consent given by patient.  Laceration approximately 0.7 cm, left index finger, laterally extending toward distal end of nail, but no disruption of the nailbed itself.  Anesthesia was applied locally with 1% lidocaine without epinephrine.  Area was cleaned with sterile saline flushes.  Entire depth of wound visualized.  Skin layer repaired with 5 total 6-0 nylon sutures, simple interrupted, with 2 sutures through the nail itself.  Patient tolerated well, no immediate complications      Vitals:    Vitals:    06/23/25 2238   BP: 114/74   Pulse: 73   Resp: 15   Temp: 98.1 °F (36.7 °C)   TempSrc: Oral   SpO2: 97%   Weight: 82.5 kg (181 lb 14.1 oz)   Height: 1.803 m (5' 11\")        ED Course as of 06/24/25 0330   Mon Jun 23, 2025   2344 Patient seen at bedside.  Presenting for laceration to left first finger while cutting bread tonight.  Minimal active oozing of blood, patient on Plavix.  Laceration will need repair with sutures.  Patient updated on tetanus.  No other wounds.  I do not feel any labs are needed.  Will plan to repair laceration and discharge home.  Patient agreeable. [LM]   Tue Jun 24, 2025   0131 Laceration repaired with 5 total 6-0 nylon sutures.  Patient tolerated well with no immediate complications.  Will discharge at this time. [LM]      ED Course User Index  [LM] Bethany Ramirez MD         Patient was given the following medications:   Medications   lidocaine 1 % injection 5 mL (has no administration in time range)        CONSULTS:   None       I am the Primary Clinician of

## 2025-06-26 ENCOUNTER — HOSPITAL ENCOUNTER (EMERGENCY)
Age: 55
Discharge: HOME | End: 2025-06-26
Attending: EMERGENCY MEDICINE
Payer: COMMERCIAL

## 2025-06-26 ENCOUNTER — APPOINTMENT (OUTPATIENT)
Dept: CT IMAGING | Age: 55
End: 2025-06-26
Payer: COMMERCIAL

## 2025-06-26 VITALS
DIASTOLIC BLOOD PRESSURE: 82 MMHG | RESPIRATION RATE: 16 BRPM | SYSTOLIC BLOOD PRESSURE: 118 MMHG | OXYGEN SATURATION: 99 % | TEMPERATURE: 98.1 F | HEART RATE: 48 BPM

## 2025-06-26 DIAGNOSIS — L02.211 ABDOMINAL WALL ABSCESS: Primary | ICD-10-CM

## 2025-06-26 LAB
ALBUMIN SERPL-MCNC: 3.8 G/DL (ref 3.4–5)
ALBUMIN/GLOB SERPL: 1.7 {RATIO} (ref 1.1–2.2)
ALP SERPL-CCNC: 92 U/L (ref 40–129)
ALT SERPL-CCNC: 9 U/L (ref 10–40)
ANION GAP SERPL CALCULATED.3IONS-SCNC: 10 MMOL/L (ref 3–16)
AST SERPL-CCNC: 15 U/L (ref 15–37)
BASOPHILS # BLD: 0.1 K/UL (ref 0–0.2)
BASOPHILS NFR BLD: 2.4 %
BILIRUB SERPL-MCNC: 0.4 MG/DL (ref 0–1)
BUN SERPL-MCNC: 8 MG/DL (ref 7–20)
CALCIUM SERPL-MCNC: 9 MG/DL (ref 8.3–10.6)
CHLORIDE SERPL-SCNC: 106 MMOL/L (ref 99–110)
CO2 SERPL-SCNC: 25 MMOL/L (ref 21–32)
CREAT SERPL-MCNC: 0.9 MG/DL (ref 0.9–1.3)
DEPRECATED RDW RBC AUTO: 18.1 % (ref 12.4–15.4)
EOSINOPHIL # BLD: 0.3 K/UL (ref 0–0.6)
EOSINOPHIL NFR BLD: 4.9 %
GFR SERPLBLD CREATININE-BSD FMLA CKD-EPI: >90 ML/MIN/{1.73_M2}
GLUCOSE SERPL-MCNC: 90 MG/DL (ref 70–99)
HCT VFR BLD AUTO: 36.8 % (ref 40.5–52.5)
HGB BLD-MCNC: 12 G/DL (ref 13.5–17.5)
LACTATE BLDV-SCNC: 0.8 MMOL/L (ref 0.4–1.9)
LYMPHOCYTES # BLD: 2.3 K/UL (ref 1–5.1)
LYMPHOCYTES NFR BLD: 39 %
MCH RBC QN AUTO: 27.7 PG (ref 26–34)
MCHC RBC AUTO-ENTMCNC: 32.5 G/DL (ref 31–36)
MCV RBC AUTO: 85.3 FL (ref 80–100)
MONOCYTES # BLD: 0.4 K/UL (ref 0–1.3)
MONOCYTES NFR BLD: 7 %
NEUTROPHILS # BLD: 2.7 K/UL (ref 1.7–7.7)
NEUTROPHILS NFR BLD: 46.7 %
PLATELET # BLD AUTO: 319 K/UL (ref 135–450)
PMV BLD AUTO: 9.1 FL (ref 5–10.5)
POTASSIUM SERPL-SCNC: 4.2 MMOL/L (ref 3.5–5.1)
PROT SERPL-MCNC: 6 G/DL (ref 6.4–8.2)
RBC # BLD AUTO: 4.32 M/UL (ref 4.2–5.9)
SODIUM SERPL-SCNC: 141 MMOL/L (ref 136–145)
WBC # BLD AUTO: 5.8 K/UL (ref 4–11)

## 2025-06-26 PROCEDURE — 6360000004 HC RX CONTRAST MEDICATION: Performed by: EMERGENCY MEDICINE

## 2025-06-26 PROCEDURE — 36415 COLL VENOUS BLD VENIPUNCTURE: CPT

## 2025-06-26 PROCEDURE — 80053 COMPREHEN METABOLIC PANEL: CPT

## 2025-06-26 PROCEDURE — 74177 CT ABD & PELVIS W/CONTRAST: CPT

## 2025-06-26 PROCEDURE — 85025 COMPLETE CBC W/AUTO DIFF WBC: CPT

## 2025-06-26 PROCEDURE — 83605 ASSAY OF LACTIC ACID: CPT

## 2025-06-26 PROCEDURE — 99285 EMERGENCY DEPT VISIT HI MDM: CPT

## 2025-06-26 PROCEDURE — 6370000000 HC RX 637 (ALT 250 FOR IP): Performed by: EMERGENCY MEDICINE

## 2025-06-26 RX ORDER — OXYCODONE AND ACETAMINOPHEN 5; 325 MG/1; MG/1
1 TABLET ORAL ONCE
Refills: 0 | Status: COMPLETED | OUTPATIENT
Start: 2025-06-26 | End: 2025-06-26

## 2025-06-26 RX ORDER — IOPAMIDOL 755 MG/ML
75 INJECTION, SOLUTION INTRAVASCULAR
Status: COMPLETED | OUTPATIENT
Start: 2025-06-26 | End: 2025-06-26

## 2025-06-26 RX ORDER — OXYCODONE HYDROCHLORIDE 5 MG/1
5 TABLET ORAL ONCE
Refills: 0 | Status: COMPLETED | OUTPATIENT
Start: 2025-06-26 | End: 2025-06-26

## 2025-06-26 RX ORDER — OXYCODONE HYDROCHLORIDE 5 MG/1
5 TABLET ORAL EVERY 8 HOURS PRN
Qty: 9 TABLET | Refills: 0 | Status: SHIPPED | OUTPATIENT
Start: 2025-06-26 | End: 2025-06-29

## 2025-06-26 RX ADMIN — IOPAMIDOL 75 ML: 755 INJECTION, SOLUTION INTRAVENOUS at 17:34

## 2025-06-26 RX ADMIN — OXYCODONE HYDROCHLORIDE 5 MG: 5 TABLET ORAL at 19:25

## 2025-06-26 RX ADMIN — OXYCODONE HYDROCHLORIDE AND ACETAMINOPHEN 1 TABLET: 5; 325 TABLET ORAL at 17:05

## 2025-06-26 ASSESSMENT — PAIN - FUNCTIONAL ASSESSMENT: PAIN_FUNCTIONAL_ASSESSMENT: 0-10

## 2025-06-26 ASSESSMENT — PAIN DESCRIPTION - ORIENTATION: ORIENTATION: ANTERIOR

## 2025-06-26 ASSESSMENT — PAIN DESCRIPTION - PAIN TYPE: TYPE: ACUTE PAIN

## 2025-06-26 ASSESSMENT — PAIN DESCRIPTION - FREQUENCY: FREQUENCY: CONTINUOUS

## 2025-06-26 ASSESSMENT — PAIN SCALES - GENERAL: PAINLEVEL_OUTOF10: 3

## 2025-06-26 ASSESSMENT — PAIN DESCRIPTION - ONSET: ONSET: ON-GOING

## 2025-06-26 ASSESSMENT — PAIN DESCRIPTION - LOCATION: LOCATION: ABDOMEN

## 2025-06-26 ASSESSMENT — PAIN DESCRIPTION - DESCRIPTORS: DESCRIPTORS: ACHING

## 2025-06-26 NOTE — ED PROVIDER NOTES
primary clinician of record.     During the patient's ED course, the patient was given:  Medications   oxyCODONE-acetaminophen (PERCOCET) 5-325 MG per tablet 1 tablet (1 tablet Oral Given 6/26/25 1705)   iopamidol (ISOVUE-370) 76 % injection 75 mL (75 mLs IntraVENous Given 6/26/25 1734)   oxyCODONE (ROXICODONE) immediate release tablet 5 mg (5 mg Oral Given 6/26/25 1925)        Patient was given prescriptions for the following medications. I counseled the patient as to how to take these medications.  Discharge Medication List as of 6/26/2025  7:38 PM        START taking these medications    Details   oxyCODONE (ROXICODONE) 5 MG immediate release tablet Take 1 tablet by mouth every 8 hours as needed for Pain for up to 3 days. Max Daily Amount: 15 mg, Disp-9 tablet, R-0Normal      amoxicillin-clavulanate (AUGMENTIN) 875-125 MG per tablet Take 1 tablet by mouth 2 times daily for 7 days, Disp-14 tablet, R-0Normal             Patient instructed to follow up with:   Tremaine Guzman MD  [address] [place] [address] 2010  [facility] [address]  [phone]    In 1 day  As scheduled    [facility]  [address] [place] [address]  [phone]    As needed, if symptoms worsen      All questions were answered and the patient/family expressed understanding and agreement with the plan.     PROCEDURES  None    CRITICAL CARE  N/A    CLINICAL IMPRESSION  1. Abdominal wall abscess        DISPOSITION  Decision To Discharge 06/26/2025 07:33:01 PM     Jamie Betts MD    Note: This chart was created using voice recognition dictation software. Efforts were made by me to ensure accuracy, however some errors may be present due to limitations of this technology and occasionally words are not transcribed correctly.       Jamie Betts MD  06/26/25 5102

## 2025-06-26 NOTE — DISCHARGE INSTRUCTIONS
Dear Davis Payton,     Thank you for the privilege of caring for you today in the Emergency Department.     Please take the antibiotic(s), as prescribed. Do not stop taking the medication early, even if you feel entirely well.     You have been prescribed an opiate medication, oxycodone. Opiates can be addictive, even in small quantities. Take only what you need to bear your pain. Return any unused portion to the pharmacy from which you obtained it. Opiates may also be sedating. While this is not a problem under normal circumstances, when taken with alcohol or while driving or operating heavy machinery, this medicine could cause you to become too sleepy and/or hurt yourself or others. Therefore, it is very important that you DO NOT DRIVE, DRINK ALCOHOL, OR OPERATE HEAVY MACHINERY WHILE TAKING THE MEDICINE(S) LISTED ABOVE.     Regards,     Jamie Betts MD, FACEP

## 2025-06-27 ENCOUNTER — OFFICE VISIT (OUTPATIENT)
Age: 55
End: 2025-06-27

## 2025-06-27 DIAGNOSIS — L02.211 ABSCESS OF ABDOMINAL WALL: Primary | ICD-10-CM

## 2025-06-27 PROCEDURE — 99024 POSTOP FOLLOW-UP VISIT: CPT | Performed by: STUDENT IN AN ORGANIZED HEALTH CARE EDUCATION/TRAINING PROGRAM

## 2025-06-27 NOTE — PROGRESS NOTES
GENERAL SURGERY  Office Note    Patient Name: Davis Payton  MRN: [id number]  YOB: 1970   Date of Service: 6/27/2025    Referred by:  Self    Chief Complaint   Patient presents with    Post-Op Check     S/P Drainage and excisional debridement of chronic abdominal wall abscess involving skin and subcutaneous tissue.  Wound dimensions, 7 x 6 x 1 cm. 5/16 Concerned that abscess is returning         SUBJECTIVE:     History of Present Illness  The patient, a 55-year-old male, presents with a recurrent abdominal wall abscess.    Abdominal wall abscess  - Underwent drainage and excisional debridement on 05/16/2025  - Developed worsening pain and was evaluated in the emergency department yesterday  - Reported subjective pyrexia and nausea, but denied vomiting or diarrhea  - CT scan revealed a fluid collection deep to the incision site, measuring 2.5 x 2 cm, slightly reduced compared to previous imaging  - Laboratory results were within normal limits    Nocturnal discomfort  - Affected area is indurated and tender to palpation  - Pain exacerbated upon movement  - Reports intermittent nausea and fatigue, disrupting sleep  - Symptomatic improvement noted today following rest    Upcoming plans  - Plans to attend a pride event on Saturday  - Plans to travel to [facility] on Monday  - Intentions to monitor for recurrence of edema during the journey    Supplemental information: His surgical history includes gastric bypass, exploratory laparoscopy for internal hernia, bowel resection (12 inches), multiple surgeries for bowel obstructions, hernia repair with mesh rupture and subsequent repair, and left nephrectomy.      REVIEW OF SYSTEMS  A comprehensive review of systems was completed and is negative except for any elements noted above.    Past Medical History:   Diagnosis Date    Alcoholism (HCC)     last drink 2015    Allergic rhinitis, seasonal     Anemia     Arthritis     right knee    Vaughn's esophagus

## 2025-06-28 ENCOUNTER — PATIENT MESSAGE (OUTPATIENT)
Dept: FAMILY MEDICINE CLINIC | Age: 55
End: 2025-06-28

## 2025-06-30 RX ORDER — TADALAFIL 20 MG/1
20 TABLET ORAL DAILY PRN
Qty: 30 TABLET | Refills: 1 | Status: SHIPPED | OUTPATIENT
Start: 2025-06-30

## 2025-07-03 ENCOUNTER — OFFICE VISIT (OUTPATIENT)
Age: 55
End: 2025-07-03

## 2025-07-03 VITALS
OXYGEN SATURATION: 96 % | BODY MASS INDEX: 25.37 KG/M2 | HEIGHT: 71 IN | DIASTOLIC BLOOD PRESSURE: 64 MMHG | TEMPERATURE: 98.7 F | SYSTOLIC BLOOD PRESSURE: 97 MMHG | HEART RATE: 57 BPM

## 2025-07-03 DIAGNOSIS — Z48.02 VISIT FOR SUTURE REMOVAL: ICD-10-CM

## 2025-07-03 DIAGNOSIS — S61.211D LACERATION OF LEFT INDEX FINGER, FOREIGN BODY PRESENCE UNSPECIFIED, NAIL DAMAGE STATUS UNSPECIFIED, SUBSEQUENT ENCOUNTER: Primary | ICD-10-CM

## 2025-07-03 NOTE — PROGRESS NOTES
Davis Payton (:  1970) is a 55 y.o. male, Established patient, here for evaluation of the following chief complaint(s):  Suture / Staple Removal (Suture removal from left index finger done on the )      ASSESSMENT/PLAN:    ICD-10-CM    1. Laceration of left index finger, foreign body presence unspecified, nail damage status unspecified, subsequent encounter  S61.211D       2. Visit for suture removal  Z48.02           Ddx -  suture removal, normal wound healing, infection, other  SUTURE REMOVAL    Date/Time: 7/3/2025 10:08 AM    Performed by: Leela Oneal PA-C  Authorized by: Leela Oneal PA-C    Consent:     Consent obtained:  Verbal    Consent given by:  Patient    Risks discussed:  Wound separation  Location:     Location:  Upper extremity    Upper extremity location:  Hand    Hand location:  L index finger  Procedure details:     Wound appearance:  No signs of infection, good wound healing and clean    Number of sutures removed:  4 ((he thinks the 5th one alreayd fell out))  Post-procedure details:     Post-removal:  Band-Aid applied    Procedure completion:  Tolerated well, no immediate complications      Management Given: sutures removed  FU PRN.       SUBJECTIVE/OBJECTIVE:  Patient presents for suture removal from left index finger.  Sutures have been in place for 10 days.  Denies pain or drainage.  Had 5 sutures but thinks 1 already fell out.          Vitals:    25 0911   BP: 97/64   BP Site: Right Upper Arm   Patient Position: Sitting   BP Cuff Size: Medium Adult   Pulse: 57   Temp: 98.7 °F (37.1 °C)   TempSrc: Temporal   SpO2: 96%   Height: 1.803 m (5' 11\")     Review of Systems   Skin:  Positive for wound.        Neg for pain or drainage     Physical Exam  Constitutional:       General: He is not in acute distress.     Appearance: Normal appearance. He is well-developed. He is not ill-appearing, toxic-appearing or diaphoretic.   HENT:      Head:

## 2025-07-03 NOTE — PATIENT INSTRUCTIONS
-Follow up with your PCP as needed.    -If your symptoms worsen, go to the nearest Emergency Department

## 2025-07-09 NOTE — PROGRESS NOTES
Airway    Performed by: Monty Crane  Authorized by: Levy Valentin MD    Final Airway Type:  Supraglottic airway  Final Supraglottic Airway:  Classic  SGA Size*:  4  Attempts*:  1  Number of Other Approaches Attempted:  0   Patient Identified, Procedure confirmed, Emergency equipment available and Safety protocols followed  Location:  OR  Difficult Airway: No    Indications for Airway Management:  Anesthesia  Sedation Level:  Anesthetized  Mask Difficulty Assessment:  0 - not attempted  Start Time: 7/9/2025 1:42 PM       according to your surgery.

## 2025-07-28 ENCOUNTER — TELEPHONE (OUTPATIENT)
Dept: SURGERY | Age: 55
End: 2025-07-28

## 2025-07-29 ENCOUNTER — APPOINTMENT (OUTPATIENT)
Dept: CT IMAGING | Age: 55
End: 2025-07-29
Payer: COMMERCIAL

## 2025-07-29 ENCOUNTER — HOSPITAL ENCOUNTER (EMERGENCY)
Age: 55
Discharge: HOME | End: 2025-07-29
Payer: COMMERCIAL

## 2025-07-29 VITALS
BODY MASS INDEX: 27.35 KG/M2 | HEIGHT: 71 IN | DIASTOLIC BLOOD PRESSURE: 67 MMHG | WEIGHT: 195.33 LBS | OXYGEN SATURATION: 95 % | RESPIRATION RATE: 16 BRPM | SYSTOLIC BLOOD PRESSURE: 112 MMHG | TEMPERATURE: 97.9 F | HEART RATE: 57 BPM

## 2025-07-29 DIAGNOSIS — L03.311 CELLULITIS OF ABDOMINAL WALL: Primary | ICD-10-CM

## 2025-07-29 DIAGNOSIS — R93.89 ABNORMAL FINDING ON CT SCAN: ICD-10-CM

## 2025-07-29 DIAGNOSIS — T81.49XA ABDOMINAL WALL ABSCESS AT SITE OF SURGICAL WOUND: ICD-10-CM

## 2025-07-29 LAB
ALBUMIN SERPL-MCNC: 3.7 G/DL (ref 3.4–5)
ALP SERPL-CCNC: 97 U/L (ref 40–129)
ALT SERPL-CCNC: 10 U/L (ref 10–40)
ANION GAP SERPL CALCULATED.3IONS-SCNC: 10 MMOL/L (ref 3–16)
AST SERPL-CCNC: 15 U/L (ref 15–37)
BASOPHILS # BLD: 0.1 K/UL (ref 0–0.2)
BASOPHILS NFR BLD: 1.5 %
BILIRUB DIRECT SERPL-MCNC: 0.2 MG/DL (ref 0–0.3)
BILIRUB INDIRECT SERPL-MCNC: 0.1 MG/DL (ref 0–1)
BILIRUB SERPL-MCNC: 0.3 MG/DL (ref 0–1)
BUN SERPL-MCNC: 10 MG/DL (ref 7–20)
CALCIUM SERPL-MCNC: 8.7 MG/DL (ref 8.3–10.6)
CHLORIDE SERPL-SCNC: 104 MMOL/L (ref 99–110)
CO2 SERPL-SCNC: 24 MMOL/L (ref 21–32)
CREAT SERPL-MCNC: 1 MG/DL (ref 0.9–1.3)
DEPRECATED RDW RBC AUTO: 18 % (ref 12.4–15.4)
EOSINOPHIL # BLD: 0.2 K/UL (ref 0–0.6)
EOSINOPHIL NFR BLD: 3 %
GFR SERPLBLD CREATININE-BSD FMLA CKD-EPI: 89 ML/MIN/{1.73_M2}
GLUCOSE SERPL-MCNC: 81 MG/DL (ref 70–99)
HCT VFR BLD AUTO: 32.8 % (ref 40.5–52.5)
HGB BLD-MCNC: 11 G/DL (ref 13.5–17.5)
LIPASE SERPL-CCNC: 26 U/L (ref 13–60)
LYMPHOCYTES # BLD: 2.1 K/UL (ref 1–5.1)
LYMPHOCYTES NFR BLD: 38.6 %
MAGNESIUM SERPL-MCNC: 2.18 MG/DL (ref 1.8–2.4)
MCH RBC QN AUTO: 28.5 PG (ref 26–34)
MCHC RBC AUTO-ENTMCNC: 33.4 G/DL (ref 31–36)
MCV RBC AUTO: 85.3 FL (ref 80–100)
MONOCYTES # BLD: 0.4 K/UL (ref 0–1.3)
MONOCYTES NFR BLD: 7.3 %
NEUTROPHILS # BLD: 2.7 K/UL (ref 1.7–7.7)
NEUTROPHILS NFR BLD: 49.6 %
PLATELET # BLD AUTO: 287 K/UL (ref 135–450)
PMV BLD AUTO: 8.7 FL (ref 5–10.5)
POTASSIUM SERPL-SCNC: 4.3 MMOL/L (ref 3.5–5.1)
PROT SERPL-MCNC: 5.9 G/DL (ref 6.4–8.2)
RBC # BLD AUTO: 3.85 M/UL (ref 4.2–5.9)
SODIUM SERPL-SCNC: 138 MMOL/L (ref 136–145)
WBC # BLD AUTO: 5.5 K/UL (ref 4–11)

## 2025-07-29 PROCEDURE — 74177 CT ABD & PELVIS W/CONTRAST: CPT

## 2025-07-29 PROCEDURE — 83690 ASSAY OF LIPASE: CPT

## 2025-07-29 PROCEDURE — 87040 BLOOD CULTURE FOR BACTERIA: CPT

## 2025-07-29 PROCEDURE — 80048 BASIC METABOLIC PNL TOTAL CA: CPT

## 2025-07-29 PROCEDURE — 96375 TX/PRO/DX INJ NEW DRUG ADDON: CPT

## 2025-07-29 PROCEDURE — 96366 THER/PROPH/DIAG IV INF ADDON: CPT

## 2025-07-29 PROCEDURE — 85025 COMPLETE CBC W/AUTO DIFF WBC: CPT

## 2025-07-29 PROCEDURE — 99285 EMERGENCY DEPT VISIT HI MDM: CPT

## 2025-07-29 PROCEDURE — 96372 THER/PROPH/DIAG INJ SC/IM: CPT

## 2025-07-29 PROCEDURE — 80076 HEPATIC FUNCTION PANEL: CPT

## 2025-07-29 PROCEDURE — 96376 TX/PRO/DX INJ SAME DRUG ADON: CPT

## 2025-07-29 PROCEDURE — 6360000004 HC RX CONTRAST MEDICATION: Performed by: NURSE PRACTITIONER

## 2025-07-29 PROCEDURE — 87077 CULTURE AEROBIC IDENTIFY: CPT

## 2025-07-29 PROCEDURE — 96365 THER/PROPH/DIAG IV INF INIT: CPT

## 2025-07-29 PROCEDURE — 6360000002 HC RX W HCPCS: Performed by: NURSE PRACTITIONER

## 2025-07-29 PROCEDURE — 87205 SMEAR GRAM STAIN: CPT

## 2025-07-29 PROCEDURE — 83735 ASSAY OF MAGNESIUM: CPT

## 2025-07-29 PROCEDURE — 87186 SC STD MICRODIL/AGAR DIL: CPT

## 2025-07-29 PROCEDURE — 87070 CULTURE OTHR SPECIMN AEROBIC: CPT

## 2025-07-29 PROCEDURE — 36415 COLL VENOUS BLD VENIPUNCTURE: CPT

## 2025-07-29 RX ORDER — MORPHINE SULFATE 4 MG/ML
4 INJECTION, SOLUTION INTRAMUSCULAR; INTRAVENOUS ONCE
Refills: 0 | Status: COMPLETED | OUTPATIENT
Start: 2025-07-29 | End: 2025-07-29

## 2025-07-29 RX ORDER — IOPAMIDOL 755 MG/ML
100 INJECTION, SOLUTION INTRAVASCULAR
Status: COMPLETED | OUTPATIENT
Start: 2025-07-29 | End: 2025-07-29

## 2025-07-29 RX ORDER — ONDANSETRON 4 MG/1
4 TABLET, ORALLY DISINTEGRATING ORAL 3 TIMES DAILY PRN
Qty: 21 TABLET | Refills: 0 | Status: SHIPPED | OUTPATIENT
Start: 2025-07-29

## 2025-07-29 RX ORDER — ONDANSETRON 2 MG/ML
4 INJECTION INTRAMUSCULAR; INTRAVENOUS ONCE
Status: COMPLETED | OUTPATIENT
Start: 2025-07-29 | End: 2025-07-29

## 2025-07-29 RX ORDER — LEVOFLOXACIN 5 MG/ML
750 INJECTION, SOLUTION INTRAVENOUS ONCE
Status: COMPLETED | OUTPATIENT
Start: 2025-07-29 | End: 2025-07-29

## 2025-07-29 RX ORDER — LEVOFLOXACIN 750 MG/1
750 TABLET, FILM COATED ORAL DAILY
Qty: 7 TABLET | Refills: 0 | Status: SHIPPED | OUTPATIENT
Start: 2025-07-29 | End: 2025-08-05

## 2025-07-29 RX ORDER — DICYCLOMINE HYDROCHLORIDE 10 MG/ML
20 INJECTION INTRAMUSCULAR ONCE
Status: COMPLETED | OUTPATIENT
Start: 2025-07-29 | End: 2025-07-29

## 2025-07-29 RX ORDER — OXYCODONE AND ACETAMINOPHEN 5; 325 MG/1; MG/1
1 TABLET ORAL EVERY 8 HOURS PRN
Qty: 9 TABLET | Refills: 0 | Status: SHIPPED | OUTPATIENT
Start: 2025-07-29 | End: 2025-07-31

## 2025-07-29 RX ADMIN — MORPHINE SULFATE 4 MG: 4 INJECTION, SOLUTION INTRAMUSCULAR; INTRAVENOUS at 19:37

## 2025-07-29 RX ADMIN — DICYCLOMINE HYDROCHLORIDE 20 MG: 10 INJECTION, SOLUTION INTRAMUSCULAR at 17:57

## 2025-07-29 RX ADMIN — LEVOFLOXACIN 750 MG: 750 INJECTION, SOLUTION INTRAVENOUS at 17:55

## 2025-07-29 RX ADMIN — ONDANSETRON 4 MG: 2 INJECTION, SOLUTION INTRAMUSCULAR; INTRAVENOUS at 17:57

## 2025-07-29 RX ADMIN — IOPAMIDOL 100 ML: 755 INJECTION, SOLUTION INTRAVENOUS at 18:58

## 2025-07-29 RX ADMIN — ONDANSETRON 4 MG: 2 INJECTION, SOLUTION INTRAMUSCULAR; INTRAVENOUS at 19:36

## 2025-07-29 ASSESSMENT — PAIN DESCRIPTION - PAIN TYPE
TYPE: ACUTE PAIN

## 2025-07-29 ASSESSMENT — PAIN DESCRIPTION - ORIENTATION
ORIENTATION: MID;LOWER
ORIENTATION: LOWER;MID
ORIENTATION: LOWER;MID

## 2025-07-29 ASSESSMENT — PAIN - FUNCTIONAL ASSESSMENT
PAIN_FUNCTIONAL_ASSESSMENT: ACTIVITIES ARE NOT PREVENTED
PAIN_FUNCTIONAL_ASSESSMENT: 0-10
PAIN_FUNCTIONAL_ASSESSMENT: ACTIVITIES ARE NOT PREVENTED

## 2025-07-29 ASSESSMENT — PAIN DESCRIPTION - DESCRIPTORS
DESCRIPTORS: BURNING

## 2025-07-29 ASSESSMENT — PAIN SCALES - GENERAL
PAINLEVEL_OUTOF10: 4
PAINLEVEL_OUTOF10: 8
PAINLEVEL_OUTOF10: 7
PAINLEVEL_OUTOF10: 6

## 2025-07-29 ASSESSMENT — PAIN DESCRIPTION - LOCATION
LOCATION: ABDOMEN

## 2025-07-29 ASSESSMENT — ENCOUNTER SYMPTOMS
EYE PAIN: 0
ABDOMINAL PAIN: 1
NAUSEA: 0
VOMITING: 0
DIARRHEA: 0
SHORTNESS OF BREATH: 0
COUGH: 0
BACK PAIN: 0
SORE THROAT: 0
ANAL BLEEDING: 0

## 2025-07-29 NOTE — ED PROVIDER NOTES
PROZAC  TAKE 1 CAPSULE BY MOUTH EVERY DAY     gabapentin 600 MG tablet  Commonly known as: NEURONTIN     Multiple Vitamin Tabs     pantoprazole 40 MG tablet  Commonly known as: PROTONIX  Take 1 tablet by mouth 2 times daily     senna 8.6 MG tablet  Commonly known as: SENOKOT     tadalafil 20 MG tablet  Commonly known as: CIALIS  Take 1 tablet by mouth daily as needed for Erectile Dysfunction     traZODone 100 MG tablet  Commonly known as: DESYREL  Take 2 tablets by mouth nightly               Where to Get Your Medications        These medications were sent to [id number] - [place], [place] - [address] - P [phone] - F [phone] [address] [facility] [facility] [id number]      Hours: 24-hours Phone: [phone]   levoFLOXacin 750 MG tablet  ondansetron 4 MG disintegrating tablet  oxyCODONE-acetaminophen 5-325 MG per tablet                    (Please note that portions of this note were completed with a voice recognition program.  Efforts were made to edit the dictations but occasionally words are mis-transcribed.)    LILLIE Fermin CNP (electronically signed)        Yaneth Brennan APRN - CNP  07/29/25 4646

## 2025-07-29 NOTE — ED TRIAGE NOTES
Pt arrives in ED from home c/o abdominal pain \"that has happened 5 times in the past year.\" Pt endorses the abd pain 8/10 comes from an abscess that has been present for 2 days when he noticed drainage 7/26 Saturday morning. Pt denies n/v, but c/o \"2 loose stools today.\" Pt denies urinary s/s. Pt denies taking medication PTA.  During RN assessment I noted a [id number] area of mild redness midline at umbilicus of pt abd, no drainage, and redness noted around abscess. 2mm noted for the abscess size

## 2025-07-31 ENCOUNTER — OFFICE VISIT (OUTPATIENT)
Dept: SURGERY | Age: 55
End: 2025-07-31

## 2025-07-31 ENCOUNTER — OFFICE VISIT (OUTPATIENT)
Dept: FAMILY MEDICINE CLINIC | Age: 55
End: 2025-07-31
Payer: COMMERCIAL

## 2025-07-31 ENCOUNTER — ANESTHESIA EVENT (OUTPATIENT)
Dept: OPERATING ROOM | Age: 55
End: 2025-07-31
Payer: COMMERCIAL

## 2025-07-31 VITALS
BODY MASS INDEX: 26.68 KG/M2 | SYSTOLIC BLOOD PRESSURE: 104 MMHG | TEMPERATURE: 97.8 F | HEIGHT: 71 IN | OXYGEN SATURATION: 97 % | DIASTOLIC BLOOD PRESSURE: 56 MMHG | RESPIRATION RATE: 20 BRPM | WEIGHT: 190.6 LBS | HEART RATE: 52 BPM

## 2025-07-31 VITALS
DIASTOLIC BLOOD PRESSURE: 67 MMHG | SYSTOLIC BLOOD PRESSURE: 112 MMHG | BODY MASS INDEX: 26.67 KG/M2 | WEIGHT: 190.5 LBS | HEIGHT: 71 IN

## 2025-07-31 DIAGNOSIS — Z90.81 HISTORY OF SPLENECTOMY: ICD-10-CM

## 2025-07-31 DIAGNOSIS — F51.01 PRIMARY INSOMNIA: Chronic | ICD-10-CM

## 2025-07-31 DIAGNOSIS — Z87.891 PERSONAL HISTORY OF TOBACCO USE: ICD-10-CM

## 2025-07-31 DIAGNOSIS — F33.1 MODERATE EPISODE OF RECURRENT MAJOR DEPRESSIVE DISORDER (HCC): ICD-10-CM

## 2025-07-31 DIAGNOSIS — F10.21 HISTORY OF ALCOHOLISM (HCC): ICD-10-CM

## 2025-07-31 DIAGNOSIS — G47.33 OSA (OBSTRUCTIVE SLEEP APNEA): ICD-10-CM

## 2025-07-31 DIAGNOSIS — Z13.1 SCREENING FOR DIABETES MELLITUS: ICD-10-CM

## 2025-07-31 DIAGNOSIS — Z00.00 WELL ADULT EXAM: Primary | ICD-10-CM

## 2025-07-31 DIAGNOSIS — E78.5 HYPERLIPIDEMIA, UNSPECIFIED HYPERLIPIDEMIA TYPE: ICD-10-CM

## 2025-07-31 DIAGNOSIS — L02.211 ABSCESS OF ABDOMINAL WALL: ICD-10-CM

## 2025-07-31 DIAGNOSIS — Z86.73 HISTORY OF STROKE: ICD-10-CM

## 2025-07-31 DIAGNOSIS — L02.211 ABSCESS OF ABDOMINAL WALL: Primary | ICD-10-CM

## 2025-07-31 PROCEDURE — 90471 IMMUNIZATION ADMIN: CPT | Performed by: FAMILY MEDICINE

## 2025-07-31 PROCEDURE — 90734 MENACWYD/MENACWYCRM VACC IM: CPT | Performed by: FAMILY MEDICINE

## 2025-07-31 PROCEDURE — 99396 PREV VISIT EST AGE 40-64: CPT | Performed by: FAMILY MEDICINE

## 2025-07-31 PROCEDURE — 99024 POSTOP FOLLOW-UP VISIT: CPT | Performed by: SURGERY

## 2025-07-31 PROCEDURE — G0296 VISIT TO DETERM LDCT ELIG: HCPCS | Performed by: FAMILY MEDICINE

## 2025-07-31 NOTE — PROGRESS NOTES
[facility] PRE-OPERATIVE INSTRUCTIONS    Day of Procedure: 8/1/2025               Arrival time:    0730              Surgery time: 0900    Take the following medications with a sip of water:  Follow your MD/Surgeons pre-procedure instructions regarding your medications     Do not eat or drink anything after 12:00 midnight prior to your surgery.  This includes water, chewing gum, mints and ice chips.   You may brush your teeth and gargle the morning of your surgery, but do not swallow the water.     Please see your family doctor/pediatrician for a history and physical and/or concerning medications.   Bring any test results/reports from your physicians office.   If you are under the care of a heart doctor or specialist doctor, please be aware that you may be asked to see them for clearance.    You may be asked to stop blood thinners such as Coumadin, Plavix, Fragmin, Lovenox, etc., or any anti-inflammatories such as:  Aspirin, Ibuprofen, Advil, Naproxen prior to your surgery.    We also ask that you stop any over the counter medications such as fish oil, vitamin E, glucosamine, garlic, Multivitamins, COQ 10, etc.    We ask that you do not smoke 24 hours prior to surgery.  We ask that you do not  drink any alcoholic beverages 24 hours prior to surgery     You must make arrangements for a responsible adult to take you home after your surgery.    For your safety, you will not be allowed to leave alone or drive yourself home.  Your surgery will be cancelled if you do not have a ride home.     Also for your safety, you must have someone stay with you the first 24 hours after your surgery.     A parent or legal guardian must accompany a child scheduled for surgery and plan to stay at the hospital until the child is discharged.    Please do not bring other children with you.    For your comfort, please wear simple loose fitting clothing to the hospital.  Please do not bring valuables.    Do not wear any make-up

## 2025-07-31 NOTE — PROGRESS NOTES
Davis Payton (:  1970) is a 55 y.o. male,Established patient, here for evaluation of the following chief complaint(s):  Follow-up (Follow up on abdominal wall abscess, pain, yellowish/greenish fluid leaking along incision, seems to have a couple open spots)         Assessment & Plan  Abscess of abdominal wall     Incision and drainage in OR    The risks, benefits and alternatives to the planned procedure were discussed. Patient expressed an understanding and is willing to proceed.                  Subjective   HPI  Abscess has recurred. Suspect he has a fistula or infected mesh. He is not ready to proceed with full exploration but is requesting incision and drainage for control of the acute infection. Plan OR on . The risks, benefits and alternatives to the planned procedure were discussed. Patient expressed an understanding and is willing to proceed.    Review of Systems       Objective   Physical Exam       Electronically signed by HELEN REYES MD on 2025 at 10:00 AM        An electronic signature was used to authenticate this note.    --HELEN REYES MD

## 2025-07-31 NOTE — ASSESSMENT & PLAN NOTE
Incision and drainage in OR    The risks, benefits and alternatives to the planned procedure were discussed. Patient expressed an understanding and is willing to proceed.

## 2025-07-31 NOTE — PROGRESS NOTES
person has not smoked for 15 years or has a health problem that limits life expectancy or the ability to have lung surgery.    The patient  reports that he has been smoking cigarettes. He started smoking about 40 years ago. He has a 20.3 pack-year smoking history. He has been exposed to tobacco smoke. He has never used smokeless tobacco.. Discussed with patient the risks and benefits of screening, including over-diagnosis, false positive rate, and total radiation exposure.  The patient currently exhibits no signs or symptoms suggestive of lung cancer.  Discussed with patient the importance of compliance with yearly annual lung cancer screenings and willingness to undergo diagnosis and treatment if screening scan is positive.  In addition, the patient was counseled regarding the importance of remaining smoke free and/or total smoking cessation.    Also reviewed the following if the patient has Medicare that as of February 10, 2022, Medicare only covers LDCT screening in patients aged 50-77 with at least a 20 pack-year smoking history who currently smoke or have quit in the last 15 years

## 2025-07-31 NOTE — ASSESSMENT & PLAN NOTE
- due for meningococcal and pneumococcal vaccinations.  (Prevnar is not in stock, will do next visit)    Orders:    Meningococcal, MENVEO, (age 2m-55y), IM

## 2025-07-31 NOTE — ASSESSMENT & PLAN NOTE
- try 150 mg dose of trazodone as 200 mg is too sedating and he is waking in the early hours on 100 mg.

## 2025-08-01 ENCOUNTER — ANESTHESIA (OUTPATIENT)
Dept: OPERATING ROOM | Age: 55
End: 2025-08-01
Payer: COMMERCIAL

## 2025-08-01 ENCOUNTER — HOSPITAL ENCOUNTER (OUTPATIENT)
Age: 55
Setting detail: OUTPATIENT SURGERY
Discharge: HOME | End: 2025-08-01
Attending: SURGERY | Admitting: SURGERY
Payer: COMMERCIAL

## 2025-08-01 VITALS
WEIGHT: 187.6 LBS | OXYGEN SATURATION: 96 % | RESPIRATION RATE: 16 BRPM | TEMPERATURE: 97.5 F | HEIGHT: 71 IN | BODY MASS INDEX: 26.26 KG/M2 | HEART RATE: 55 BPM | SYSTOLIC BLOOD PRESSURE: 105 MMHG | DIASTOLIC BLOOD PRESSURE: 76 MMHG

## 2025-08-01 DIAGNOSIS — L02.211 ABSCESS OF ABDOMINAL WALL: ICD-10-CM

## 2025-08-01 LAB
BACTERIA SPEC AEROBE CULT: ABNORMAL
BACTERIA SPEC AEROBE CULT: ABNORMAL
GRAM STN SPEC: ABNORMAL
ORGANISM: ABNORMAL
ORGANISM: ABNORMAL

## 2025-08-01 PROCEDURE — 3700000000 HC ANESTHESIA ATTENDED CARE: Performed by: SURGERY

## 2025-08-01 PROCEDURE — 6360000002 HC RX W HCPCS: Performed by: SURGERY

## 2025-08-01 PROCEDURE — 2500000003 HC RX 250 WO HCPCS: Performed by: SURGERY

## 2025-08-01 PROCEDURE — 87075 CULTR BACTERIA EXCEPT BLOOD: CPT

## 2025-08-01 PROCEDURE — 6360000002 HC RX W HCPCS

## 2025-08-01 PROCEDURE — 2580000003 HC RX 258: Performed by: SURGERY

## 2025-08-01 PROCEDURE — 3600000012 HC SURGERY LEVEL 2 ADDTL 15MIN: Performed by: SURGERY

## 2025-08-01 PROCEDURE — 6360000002 HC RX W HCPCS: Performed by: STUDENT IN AN ORGANIZED HEALTH CARE EDUCATION/TRAINING PROGRAM

## 2025-08-01 PROCEDURE — 7100000011 HC PHASE II RECOVERY - ADDTL 15 MIN: Performed by: SURGERY

## 2025-08-01 PROCEDURE — 7100000000 HC PACU RECOVERY - FIRST 15 MIN: Performed by: SURGERY

## 2025-08-01 PROCEDURE — 2709999900 HC NON-CHARGEABLE SUPPLY: Performed by: SURGERY

## 2025-08-01 PROCEDURE — 2580000003 HC RX 258: Performed by: ANESTHESIOLOGY

## 2025-08-01 PROCEDURE — 6370000000 HC RX 637 (ALT 250 FOR IP): Performed by: STUDENT IN AN ORGANIZED HEALTH CARE EDUCATION/TRAINING PROGRAM

## 2025-08-01 PROCEDURE — 2500000003 HC RX 250 WO HCPCS

## 2025-08-01 PROCEDURE — 7100000010 HC PHASE II RECOVERY - FIRST 15 MIN: Performed by: SURGERY

## 2025-08-01 PROCEDURE — 87186 SC STD MICRODIL/AGAR DIL: CPT

## 2025-08-01 PROCEDURE — 3600000002 HC SURGERY LEVEL 2 BASE: Performed by: SURGERY

## 2025-08-01 PROCEDURE — 87077 CULTURE AEROBIC IDENTIFY: CPT

## 2025-08-01 PROCEDURE — 87070 CULTURE OTHR SPECIMN AEROBIC: CPT

## 2025-08-01 PROCEDURE — 7100000001 HC PACU RECOVERY - ADDTL 15 MIN: Performed by: SURGERY

## 2025-08-01 PROCEDURE — 87205 SMEAR GRAM STAIN: CPT

## 2025-08-01 PROCEDURE — 3700000001 HC ADD 15 MINUTES (ANESTHESIA): Performed by: SURGERY

## 2025-08-01 RX ORDER — SODIUM CHLORIDE 0.9 % (FLUSH) 0.9 %
5-40 SYRINGE (ML) INJECTION PRN
Status: DISCONTINUED | OUTPATIENT
Start: 2025-08-01 | End: 2025-08-01 | Stop reason: HOSPADM

## 2025-08-01 RX ORDER — PROPOFOL 10 MG/ML
INJECTION, EMULSION INTRAVENOUS
Status: DISCONTINUED | OUTPATIENT
Start: 2025-08-01 | End: 2025-08-01 | Stop reason: SDUPTHER

## 2025-08-01 RX ORDER — CLOPIDOGREL BISULFATE 75 MG/1
75 TABLET ORAL DAILY
Qty: 90 TABLET | Refills: 1 | Status: SHIPPED | OUTPATIENT
Start: 2025-08-01

## 2025-08-01 RX ORDER — DIPHENHYDRAMINE HYDROCHLORIDE 50 MG/ML
INJECTION, SOLUTION INTRAMUSCULAR; INTRAVENOUS
Status: DISCONTINUED | OUTPATIENT
Start: 2025-08-01 | End: 2025-08-01 | Stop reason: SDUPTHER

## 2025-08-01 RX ORDER — OXYCODONE AND ACETAMINOPHEN 5; 325 MG/1; MG/1
1 TABLET ORAL EVERY 6 HOURS PRN
Qty: 28 TABLET | Refills: 0 | Status: SHIPPED | OUTPATIENT
Start: 2025-08-01 | End: 2025-08-08

## 2025-08-01 RX ORDER — EPHEDRINE SULFATE/0.9% NACL/PF 25 MG/5 ML
SYRINGE (ML) INTRAVENOUS
Status: DISCONTINUED | OUTPATIENT
Start: 2025-08-01 | End: 2025-08-01 | Stop reason: SDUPTHER

## 2025-08-01 RX ORDER — DIPHENHYDRAMINE HYDROCHLORIDE 50 MG/ML
12.5 INJECTION, SOLUTION INTRAMUSCULAR; INTRAVENOUS PRN
Status: DISCONTINUED | OUTPATIENT
Start: 2025-08-01 | End: 2025-08-01 | Stop reason: HOSPADM

## 2025-08-01 RX ORDER — FENTANYL CITRATE 50 UG/ML
INJECTION, SOLUTION INTRAMUSCULAR; INTRAVENOUS
Status: DISCONTINUED | OUTPATIENT
Start: 2025-08-01 | End: 2025-08-01 | Stop reason: SDUPTHER

## 2025-08-01 RX ORDER — DEXAMETHASONE SODIUM PHOSPHATE 4 MG/ML
INJECTION, SOLUTION INTRA-ARTICULAR; INTRALESIONAL; INTRAMUSCULAR; INTRAVENOUS; SOFT TISSUE
Status: DISCONTINUED | OUTPATIENT
Start: 2025-08-01 | End: 2025-08-01 | Stop reason: SDUPTHER

## 2025-08-01 RX ORDER — IPRATROPIUM BROMIDE AND ALBUTEROL SULFATE 2.5; .5 MG/3ML; MG/3ML
1 SOLUTION RESPIRATORY (INHALATION)
Status: DISCONTINUED | OUTPATIENT
Start: 2025-08-01 | End: 2025-08-01 | Stop reason: HOSPADM

## 2025-08-01 RX ORDER — SODIUM CHLORIDE 0.9 % (FLUSH) 0.9 %
5-40 SYRINGE (ML) INJECTION EVERY 12 HOURS SCHEDULED
Status: DISCONTINUED | OUTPATIENT
Start: 2025-08-01 | End: 2025-08-01 | Stop reason: HOSPADM

## 2025-08-01 RX ORDER — ONDANSETRON 2 MG/ML
4 INJECTION INTRAMUSCULAR; INTRAVENOUS
Status: DISCONTINUED | OUTPATIENT
Start: 2025-08-01 | End: 2025-08-01 | Stop reason: HOSPADM

## 2025-08-01 RX ORDER — LABETALOL HYDROCHLORIDE 5 MG/ML
10 INJECTION, SOLUTION INTRAVENOUS
Status: DISCONTINUED | OUTPATIENT
Start: 2025-08-01 | End: 2025-08-01 | Stop reason: HOSPADM

## 2025-08-01 RX ORDER — MAGNESIUM HYDROXIDE 1200 MG/15ML
LIQUID ORAL CONTINUOUS PRN
Status: DISCONTINUED | OUTPATIENT
Start: 2025-08-01 | End: 2025-08-01 | Stop reason: HOSPADM

## 2025-08-01 RX ORDER — ONDANSETRON 2 MG/ML
INJECTION INTRAMUSCULAR; INTRAVENOUS
Status: DISCONTINUED | OUTPATIENT
Start: 2025-08-01 | End: 2025-08-01 | Stop reason: SDUPTHER

## 2025-08-01 RX ORDER — OXYCODONE HYDROCHLORIDE 10 MG/1
10 TABLET ORAL PRN
Status: COMPLETED | OUTPATIENT
Start: 2025-08-01 | End: 2025-08-01

## 2025-08-01 RX ORDER — HYDRALAZINE HYDROCHLORIDE 20 MG/ML
10 INJECTION INTRAMUSCULAR; INTRAVENOUS
Status: DISCONTINUED | OUTPATIENT
Start: 2025-08-01 | End: 2025-08-01 | Stop reason: HOSPADM

## 2025-08-01 RX ORDER — OXYCODONE HYDROCHLORIDE 5 MG/1
5 TABLET ORAL PRN
Status: COMPLETED | OUTPATIENT
Start: 2025-08-01 | End: 2025-08-01

## 2025-08-01 RX ORDER — GLYCOPYRROLATE 0.2 MG/ML
INJECTION INTRAMUSCULAR; INTRAVENOUS
Status: DISCONTINUED | OUTPATIENT
Start: 2025-08-01 | End: 2025-08-01 | Stop reason: SDUPTHER

## 2025-08-01 RX ORDER — LIDOCAINE HYDROCHLORIDE 20 MG/ML
INJECTION, SOLUTION EPIDURAL; INFILTRATION; INTRACAUDAL; PERINEURAL
Status: DISCONTINUED | OUTPATIENT
Start: 2025-08-01 | End: 2025-08-01 | Stop reason: SDUPTHER

## 2025-08-01 RX ORDER — MIDAZOLAM HYDROCHLORIDE 1 MG/ML
INJECTION, SOLUTION INTRAMUSCULAR; INTRAVENOUS
Status: DISCONTINUED | OUTPATIENT
Start: 2025-08-01 | End: 2025-08-01 | Stop reason: SDUPTHER

## 2025-08-01 RX ORDER — SODIUM CHLORIDE 9 MG/ML
INJECTION, SOLUTION INTRAVENOUS PRN
Status: DISCONTINUED | OUTPATIENT
Start: 2025-08-01 | End: 2025-08-01 | Stop reason: HOSPADM

## 2025-08-01 RX ADMIN — LIDOCAINE HYDROCHLORIDE 100 MG: 20 INJECTION, SOLUTION EPIDURAL; INFILTRATION; INTRACAUDAL; PERINEURAL at 10:20

## 2025-08-01 RX ADMIN — DIPHENHYDRAMINE HYDROCHLORIDE 12.5 MG: 50 INJECTION INTRAMUSCULAR; INTRAVENOUS at 10:36

## 2025-08-01 RX ADMIN — FENTANYL CITRATE 25 MCG: 50 INJECTION INTRAMUSCULAR; INTRAVENOUS at 11:01

## 2025-08-01 RX ADMIN — DEXAMETHASONE SODIUM PHOSPHATE 4 MG: 4 INJECTION, SOLUTION INTRAMUSCULAR; INTRAVENOUS at 10:26

## 2025-08-01 RX ADMIN — FENTANYL CITRATE 25 MCG: 50 INJECTION INTRAMUSCULAR; INTRAVENOUS at 10:23

## 2025-08-01 RX ADMIN — MIDAZOLAM 2 MG: 1 INJECTION INTRAMUSCULAR; INTRAVENOUS at 10:14

## 2025-08-01 RX ADMIN — HYDROMORPHONE HYDROCHLORIDE 0.5 MG: 1 INJECTION, SOLUTION INTRAMUSCULAR; INTRAVENOUS; SUBCUTANEOUS at 11:26

## 2025-08-01 RX ADMIN — FENTANYL CITRATE 25 MCG: 50 INJECTION INTRAMUSCULAR; INTRAVENOUS at 10:50

## 2025-08-01 RX ADMIN — ONDANSETRON 4 MG: 2 INJECTION INTRAMUSCULAR; INTRAVENOUS at 10:45

## 2025-08-01 RX ADMIN — SODIUM CHLORIDE: 9 INJECTION, SOLUTION INTRAVENOUS at 10:14

## 2025-08-01 RX ADMIN — EPHEDRINE SULFATE 5 MG: 5 INJECTION INTRAVENOUS at 10:47

## 2025-08-01 RX ADMIN — HYDROMORPHONE HYDROCHLORIDE 0.5 MG: 1 INJECTION, SOLUTION INTRAMUSCULAR; INTRAVENOUS; SUBCUTANEOUS at 11:11

## 2025-08-01 RX ADMIN — OXYCODONE 5 MG: 5 TABLET ORAL at 12:25

## 2025-08-01 RX ADMIN — FENTANYL CITRATE 25 MCG: 50 INJECTION INTRAMUSCULAR; INTRAVENOUS at 10:58

## 2025-08-01 RX ADMIN — EPHEDRINE SULFATE 5 MG: 5 INJECTION INTRAVENOUS at 10:36

## 2025-08-01 RX ADMIN — PROPOFOL 150 MG: 10 INJECTION, EMULSION INTRAVENOUS at 10:20

## 2025-08-01 RX ADMIN — SODIUM CHLORIDE 2000 MG: 9 INJECTION, SOLUTION INTRAVENOUS at 10:26

## 2025-08-01 RX ADMIN — PROPOFOL 50 MG: 10 INJECTION, EMULSION INTRAVENOUS at 10:22

## 2025-08-01 RX ADMIN — GLYCOPYRROLATE 0.2 MG: 0.2 INJECTION, SOLUTION INTRAMUSCULAR; INTRAVENOUS at 10:19

## 2025-08-01 ASSESSMENT — PAIN - FUNCTIONAL ASSESSMENT
PAIN_FUNCTIONAL_ASSESSMENT: 0-10
PAIN_FUNCTIONAL_ASSESSMENT: PREVENTS OR INTERFERES SOME ACTIVE ACTIVITIES AND ADLS
PAIN_FUNCTIONAL_ASSESSMENT: 0-10
PAIN_FUNCTIONAL_ASSESSMENT: PREVENTS OR INTERFERES SOME ACTIVE ACTIVITIES AND ADLS
PAIN_FUNCTIONAL_ASSESSMENT: 0-10
PAIN_FUNCTIONAL_ASSESSMENT: 0-10
PAIN_FUNCTIONAL_ASSESSMENT: PREVENTS OR INTERFERES SOME ACTIVE ACTIVITIES AND ADLS

## 2025-08-01 ASSESSMENT — PAIN DESCRIPTION - DESCRIPTORS
DESCRIPTORS: ACHING
DESCRIPTORS: ACHING
DESCRIPTORS: ACHING;SHARP
DESCRIPTORS: ACHING;SHARP
DESCRIPTORS: ACHING

## 2025-08-01 ASSESSMENT — PAIN DESCRIPTION - FREQUENCY
FREQUENCY: CONTINUOUS

## 2025-08-01 ASSESSMENT — PAIN DESCRIPTION - ORIENTATION
ORIENTATION: LEFT;RIGHT
ORIENTATION: MID

## 2025-08-01 ASSESSMENT — PAIN DESCRIPTION - ONSET
ONSET: ON-GOING

## 2025-08-01 ASSESSMENT — PAIN SCALES - GENERAL
PAINLEVEL_OUTOF10: 8
PAINLEVEL_OUTOF10: 5
PAINLEVEL_OUTOF10: 6
PAINLEVEL_OUTOF10: 8

## 2025-08-01 ASSESSMENT — LIFESTYLE VARIABLES: SMOKING_STATUS: 0

## 2025-08-01 ASSESSMENT — PAIN DESCRIPTION - PAIN TYPE
TYPE: SURGICAL PAIN

## 2025-08-01 ASSESSMENT — PAIN DESCRIPTION - LOCATION
LOCATION: ABDOMEN

## 2025-08-01 NOTE — PROGRESS NOTES
PACU Transfer to [facility]    Vitals:    08/01/25 1130   BP: 119/78   Pulse: 61   Resp: 14   Temp: 97.9 °F (36.6 °C)   SpO2: 95%         Intake/Output Summary (Last 24 hours) at 8/1/2025 1139  Last data filed at 8/1/2025 1102  Gross per 24 hour   Intake 550 ml   Output 20 ml   Net 530 ml       Pain assessment:  level of pain (1-10, 10 severe), Pt complains of 6/10 pain to his abdomen which he states is tolerable; PRN medications given in PACU, see MAR for medication administration.  Pain Level: 6    Abdominal incision dressing noted to be saturated with serosanguinous drainage.     Patient transferred to care of LENARD RN.    8/1/2025 11:39 AM

## 2025-08-01 NOTE — DISCHARGE INSTRUCTIONS
Follow up in 2 weeks  Call [phone] for an appointment  Remove dressings in AM and repack wound daily  Ok to shower in AM  No Driving for today. OK to drive starting 8/2 if you are not taking any pain medication.

## 2025-08-01 NOTE — BRIEF OP NOTE
Brief Postoperative Note      Patient: Davis Payton  YOB: 1970  MRN: [id number]    Date of Procedure: 8/1/2025    Pre-Op Diagnosis Codes:      * Abscess of abdominal wall [L02.211]    Post-Op Diagnosis: Same       Procedure(s):  INCISION AND DRAINAGE OF ABDOMINAL WALL ABSCESS    Surgeon(s):  Helen Middleton MD    Assistant:  Surgical Assistant: Irwin Montes Kim    Anesthesia: General    Estimated Blood Loss (mL): less than 50     Complications: None    Specimens:   ID Type Source Tests Collected by Time Destination   1 : 1) abdominal wall abscess Specimen Tissue CULTURE, SURGICAL Helen Middleton MD 8/1/2025 1037        Implants:  * No implants in log *      Drains: * No LDAs found *    Findings:  Present At Time Of Surgery (PATOS) (choose all levels that have infection present):  - Superficial Infection (skin/subcutaneous) present as evidenced by pus  Other Findings: chronic abscess    Electronically signed by HELEN MIDDLETON MD on 8/1/2025 at 10:49 AM

## 2025-08-01 NOTE — H&P
Preoperative History and Physical Update    H&P from 7/31/2025 was reviewed    No changes noted today    PE  Alert and oriented  Open wound with purulent drainage in midline abdomen    A/P  Chronic abscess abdominal wall  For incision and drainage today  The risks, benefits and alternatives to the planned procedure were discussed. Patient expressed an understanding and is willing to proceed.    Electronically signed by HEELN REYES MD on 8/1/2025 at 9:59 AM

## 2025-08-01 NOTE — PROGRESS NOTES
Moderate amount of bloody drainage to abdomen dressing. Changed dressing with gauze and tegaderm. Wife to room and states that she never spoke with surgeon. Spoke to Kely THORNTON in OR with Dr. Middleton about drainage to abdomen dressing and wife would like to speak to surgeon. Kely will relay info.

## 2025-08-01 NOTE — PROGRESS NOTES
Pt tolerates PO. Pain in abdomen 5/10 but increasing per pt. Oxycodone 1 tablet PO given for pain. Prescription delivered by Rosalio.

## 2025-08-01 NOTE — ANESTHESIA PRE PROCEDURE
01:17 AM    COVID19 Not Detected 02/12/2021 01:45 PM    COVID19 NOT DETECTED 08/06/2020 11:11 AM           Anesthesia Evaluation     no history of anesthetic complications:   Airway: Mallampati: I  TM distance: >3 FB   Neck ROM: full  Comment: Facial hair: full long beard  Mouth opening: > = 3 FB   Dental:    (+) edentulous      Pulmonary: breath sounds clear to auscultation  (+)     sleep apnea: on CPAP,           (-) COPD, asthma, rhonchi, wheezes, rales and not a current smoker                           Cardiovascular:  Exercise tolerance: good (>4 METS)  (+) dysrhythmias (bradycardia with loop recorder):, hyperlipidemia    (-) past MI, CAD,  CHF, orthopnea, weak pulses and peripheral edema      Rhythm: regular  Rate: normal                 ROS comment: TTE 2021:     Conclusions      Summary   Normal left ventricle size, wall thickness, and systolic function with an   estimated ejection fraction of 55%. No regional wall motion abnormalities   are seen.   A bubble study was performed and fails to show evidence of right to left   shunting.   A 25 mm Amplatzer PFO occluder device was seen and appears well seated   between the right and left atria.   No evidence of any pericardial effusion.     Neuro/Psych:   (+) CVA (intermittent left sided weakness, short term memory loss):, TIA, headaches: migraine headaches, psychiatric history (ETOH abuse):depression/anxiety    (-) seizures            ROS comment: History of kyphoplasty GI/Hepatic/Renal:   (+) GERD:     (-) liver disease, no renal disease and no morbid obesity (max weight: 480 pounds)      ROS comment: Recently discharged, admitted for recurrent abdominal pain, nausea & vomiting. .   Endo/Other: Negative Endo/Other ROS   (+) blood dyscrasia (H&H: 11.0/32.8): anemia:..    (-) diabetes mellitus, no electrolyte abnormalities               Abdominal:         (-) obese Abdomen: soft.      Vascular:          Other Findings: Very pleasant. Excellent historian. Past

## 2025-08-01 NOTE — PROGRESS NOTES
Pt groggy on arrival to phase II. VSS. Pain 6/10 across lower abdomen at incision site. Given snack. HOB up. Texted wife to come in.

## 2025-08-01 NOTE — PROGRESS NOTES
CLINICAL PHARMACY NOTE: MEDS TO BEDS    Total # of Prescriptions Filled: 1   The following medications were delivered to the patient:  Discharge Medication List as of 8/1/2025 12:45 PM        START taking these medications    Details   oxyCODONE-acetaminophen (PERCOCET) 5-325 MG per tablet Take 1 tablet by mouth every 6 hours as needed for Pain for up to 7 days. Intended supply: 7 days. Take lowest dose possible to manage pain Max Daily Amount: 4 tablets, Disp-28 tablet, R-0Normal               Additional Documentation: Delivered to patient room, reviewed with patient and wife. Left medication bag on chair.

## 2025-08-01 NOTE — PROGRESS NOTES
Patient admitted to PACU # 9 from OR at 1059 post incision and drainage of abdominal wall abscess per Dr. Middleton.  Attached to PACU monitoring system and report received from anesthesia provider.  Patient was reported to be hemodynamically stable during procedure.  Patient drowsy on admission and complains of 8/10 pain to his abdomen; CRNA at bedside administering PRN medication for pain. Abdominal incision noted to have a moderate amount of serosanguinous strikethrough drainage present. Ice pack applied.

## 2025-08-01 NOTE — ANESTHESIA POSTPROCEDURE EVALUATION
Department of Anesthesiology  Postprocedure Note    Patient: Davis Payton  MRN: [id number]  YOB: 1970  Date of evaluation: 8/1/2025    Procedure Summary       Date: 08/01/25 Room / Location: [address]    Anesthesia Start: 1014 Anesthesia Stop: 1102    Procedure: INCISION AND DRAINAGE OF ABDOMINAL WALL ABSCESS (Abdomen) Diagnosis:       Abscess of abdominal wall      (Abscess of abdominal wall [L02.211])    Surgeons: Kurtis Middleton MD Responsible Provider: Abdelrahman Bermudez MD    Anesthesia Type: general ASA Status: 3            Anesthesia Type: General    Cande Phase I: Cande Score: 10    Cande Phase II: Cande Score: 10    Anesthesia Post Evaluation    Patient location during evaluation: PACU  Patient participation: complete - patient participated  Level of consciousness: awake  Airway patency: patent  Nausea & Vomiting: no nausea and no vomiting  Cardiovascular status: hemodynamically stable and blood pressure returned to baseline  Respiratory status: spontaneous ventilation, nonlabored ventilation and room air  Hydration status: stable  Comments: Mr. Payton was seen resting comfortably following his procedure. Final disposition per Dr. Middleton.   Pain management: adequate    No notable events documented.

## 2025-08-02 LAB — BACTERIA BLD CULT: NORMAL

## 2025-08-02 NOTE — OP NOTE
[facility]          [address] [place] [address]                            OPERATIVE REPORT      PATIENT NAME: SHIN SALINAS              : 1970  MED REC NO: [id number]                      ROOM: OR  ACCOUNT NO: [id number]                       ADMIT DATE: 2025  PROVIDER: Kurtis Middleton MD      DATE OF PROCEDURE:  2025    SURGEON:  Kurtis Middleton MD    PREOPERATIVE DIAGNOSIS:  Abdominal wall abscess.    POSTOPERATIVE DIAGNOSIS:  Abdominal wall abscess.    PROCEDURE:  Incision and drainage abdominal wall abscess, skin and subcutaneous tissue only.    SPECIMEN:  Cultures.    ESTIMATED BLOOD LOSS:  Less than 50 mL.    COMPLICATIONS:  None.    DISPOSITION:  To Recovery in stable condition.    INDICATION:  The patient is a 55-year-old with recurring abscess of the abdominal wall.  This has been confirmed on CT scanning and after discussing the risks, benefits, alternatives, he agreed to proceed with drainage today.    DESCRIPTION OF PROCEDURE:  The patient was brought to the operating room, placed supine, and anesthesia delivered.  The abdomen was prepped and draped in a sterile fashion.  His previous midline scar was re-incised and dissection carried into the subcutaneous tissue where pus was encountered.  This extended to the right lower quadrant as noted on CT scanning.  The incision was slightly extended and then dissection carried into the right lateral abdominal wall where a 2nd pocket of pus was encountered.  This was opened, suctioned clear, and then any chronic abscess tissue debrided away.  A counter incision was made at the lateral edge of this cavity to allow for packing.  The wound was then irrigated.  Hemostasis achieved, and the midline approximated with interrupted 2-0 nylons, leaving spacing for packing.  All wounds were then packed with saline-soaked gauze and a dressing applied.  The patient was stable and

## 2025-08-03 LAB
BACTERIA SPEC AEROBE CULT: NORMAL
BACTERIA SPEC ANAEROBE CULT: NORMAL
GRAM STN SPEC: NORMAL

## 2025-08-06 LAB
BACTERIA SPEC AEROBE CULT: ABNORMAL
BACTERIA SPEC AEROBE CULT: ABNORMAL
BACTERIA SPEC ANAEROBE CULT: ABNORMAL
GRAM STN SPEC: ABNORMAL
ORGANISM: ABNORMAL
ORGANISM: ABNORMAL

## 2025-08-09 ENCOUNTER — HOSPITAL ENCOUNTER (EMERGENCY)
Age: 55
Discharge: HOME | End: 2025-08-10
Payer: COMMERCIAL

## 2025-08-09 DIAGNOSIS — R10.9 ACUTE POSTOPERATIVE ABDOMINAL PAIN: Primary | ICD-10-CM

## 2025-08-09 DIAGNOSIS — K52.9 COLITIS: ICD-10-CM

## 2025-08-09 DIAGNOSIS — G89.18 ACUTE POSTOPERATIVE ABDOMINAL PAIN: Primary | ICD-10-CM

## 2025-08-09 PROCEDURE — 99285 EMERGENCY DEPT VISIT HI MDM: CPT

## 2025-08-10 ENCOUNTER — APPOINTMENT (OUTPATIENT)
Dept: CT IMAGING | Age: 55
End: 2025-08-10
Payer: COMMERCIAL

## 2025-08-10 VITALS
OXYGEN SATURATION: 96 % | SYSTOLIC BLOOD PRESSURE: 117 MMHG | TEMPERATURE: 98 F | BODY MASS INDEX: 26.67 KG/M2 | DIASTOLIC BLOOD PRESSURE: 73 MMHG | HEART RATE: 66 BPM | HEIGHT: 71 IN | RESPIRATION RATE: 16 BRPM | WEIGHT: 190.48 LBS

## 2025-08-10 LAB
ALBUMIN SERPL-MCNC: 4.1 G/DL (ref 3.4–5)
ALBUMIN/GLOB SERPL: 1.6 {RATIO} (ref 1.1–2.2)
ALP SERPL-CCNC: 114 U/L (ref 40–129)
ALT SERPL-CCNC: 8 U/L (ref 10–40)
ANION GAP SERPL CALCULATED.3IONS-SCNC: 4 MMOL/L (ref 3–16)
AST SERPL-CCNC: 16 U/L (ref 15–37)
BASOPHILS # BLD: 0.1 K/UL (ref 0–0.2)
BASOPHILS NFR BLD: 1.1 %
BILIRUB SERPL-MCNC: <0.2 MG/DL (ref 0–1)
BUN SERPL-MCNC: 12 MG/DL (ref 7–20)
CALCIUM SERPL-MCNC: 8.9 MG/DL (ref 8.3–10.6)
CHLORIDE SERPL-SCNC: 107 MMOL/L (ref 99–110)
CO2 SERPL-SCNC: 26 MMOL/L (ref 21–32)
CREAT SERPL-MCNC: 0.9 MG/DL (ref 0.9–1.3)
DEPRECATED RDW RBC AUTO: 18.1 % (ref 12.4–15.4)
EOSINOPHIL # BLD: 0.4 K/UL (ref 0–0.6)
EOSINOPHIL NFR BLD: 4.8 %
GFR SERPLBLD CREATININE-BSD FMLA CKD-EPI: >90 ML/MIN/{1.73_M2}
GLUCOSE SERPL-MCNC: 96 MG/DL (ref 70–99)
HCT VFR BLD AUTO: 35.7 % (ref 40.5–52.5)
HGB BLD-MCNC: 11.8 G/DL (ref 13.5–17.5)
LACTATE BLDV-SCNC: 0.9 MMOL/L (ref 0.4–2)
LIPASE SERPL-CCNC: 22 U/L (ref 13–60)
LYMPHOCYTES # BLD: 3.3 K/UL (ref 1–5.1)
LYMPHOCYTES NFR BLD: 36.4 %
MCH RBC QN AUTO: 28.1 PG (ref 26–34)
MCHC RBC AUTO-ENTMCNC: 33 G/DL (ref 31–36)
MCV RBC AUTO: 85.1 FL (ref 80–100)
MONOCYTES # BLD: 0.7 K/UL (ref 0–1.3)
MONOCYTES NFR BLD: 8.1 %
NEUTROPHILS # BLD: 4.5 K/UL (ref 1.7–7.7)
NEUTROPHILS NFR BLD: 49.6 %
PLATELET # BLD AUTO: 339 K/UL (ref 135–450)
PMV BLD AUTO: 8.1 FL (ref 5–10.5)
POTASSIUM SERPL-SCNC: 4.1 MMOL/L (ref 3.5–5.1)
PROT SERPL-MCNC: 6.6 G/DL (ref 6.4–8.2)
RBC # BLD AUTO: 4.2 M/UL (ref 4.2–5.9)
SODIUM SERPL-SCNC: 137 MMOL/L (ref 136–145)
WBC # BLD AUTO: 9 K/UL (ref 4–11)

## 2025-08-10 PROCEDURE — 36415 COLL VENOUS BLD VENIPUNCTURE: CPT

## 2025-08-10 PROCEDURE — 74177 CT ABD & PELVIS W/CONTRAST: CPT

## 2025-08-10 PROCEDURE — 96374 THER/PROPH/DIAG INJ IV PUSH: CPT

## 2025-08-10 PROCEDURE — 96375 TX/PRO/DX INJ NEW DRUG ADDON: CPT

## 2025-08-10 PROCEDURE — 6360000004 HC RX CONTRAST MEDICATION

## 2025-08-10 PROCEDURE — 96376 TX/PRO/DX INJ SAME DRUG ADON: CPT

## 2025-08-10 PROCEDURE — 6360000002 HC RX W HCPCS

## 2025-08-10 PROCEDURE — 83690 ASSAY OF LIPASE: CPT

## 2025-08-10 PROCEDURE — 80053 COMPREHEN METABOLIC PANEL: CPT

## 2025-08-10 PROCEDURE — 83605 ASSAY OF LACTIC ACID: CPT

## 2025-08-10 PROCEDURE — 2580000003 HC RX 258

## 2025-08-10 PROCEDURE — 85025 COMPLETE CBC W/AUTO DIFF WBC: CPT

## 2025-08-10 RX ORDER — ONDANSETRON 2 MG/ML
4 INJECTION INTRAMUSCULAR; INTRAVENOUS ONCE
Status: COMPLETED | OUTPATIENT
Start: 2025-08-10 | End: 2025-08-10

## 2025-08-10 RX ORDER — IOPAMIDOL 755 MG/ML
75 INJECTION, SOLUTION INTRAVASCULAR
Status: COMPLETED | OUTPATIENT
Start: 2025-08-10 | End: 2025-08-10

## 2025-08-10 RX ORDER — OXYCODONE AND ACETAMINOPHEN 5; 325 MG/1; MG/1
1 TABLET ORAL EVERY 8 HOURS PRN
Qty: 6 TABLET | Refills: 0 | Status: SHIPPED | OUTPATIENT
Start: 2025-08-10 | End: 2025-08-12

## 2025-08-10 RX ORDER — ACETAMINOPHEN 325 MG/1
650 TABLET ORAL EVERY 6 HOURS PRN
Qty: 60 TABLET | Refills: 0 | Status: SHIPPED | OUTPATIENT
Start: 2025-08-10

## 2025-08-10 RX ORDER — 0.9 % SODIUM CHLORIDE 0.9 %
1000 INTRAVENOUS SOLUTION INTRAVENOUS ONCE
Status: COMPLETED | OUTPATIENT
Start: 2025-08-10 | End: 2025-08-10

## 2025-08-10 RX ADMIN — IOPAMIDOL 75 ML: 755 INJECTION, SOLUTION INTRAVENOUS at 01:19

## 2025-08-10 RX ADMIN — ONDANSETRON 4 MG: 2 INJECTION, SOLUTION INTRAMUSCULAR; INTRAVENOUS at 00:47

## 2025-08-10 RX ADMIN — HYDROMORPHONE HYDROCHLORIDE 0.5 MG: 1 INJECTION, SOLUTION INTRAMUSCULAR; INTRAVENOUS; SUBCUTANEOUS at 00:48

## 2025-08-10 RX ADMIN — HYDROMORPHONE HYDROCHLORIDE 0.5 MG: 1 INJECTION, SOLUTION INTRAMUSCULAR; INTRAVENOUS; SUBCUTANEOUS at 01:50

## 2025-08-10 RX ADMIN — SODIUM CHLORIDE 1000 ML: 0.9 INJECTION, SOLUTION INTRAVENOUS at 00:46

## 2025-08-10 ASSESSMENT — PAIN SCALES - GENERAL
PAINLEVEL_OUTOF10: 8
PAINLEVEL_OUTOF10: 9
PAINLEVEL_OUTOF10: 8
PAINLEVEL_OUTOF10: 7
PAINLEVEL_OUTOF10: 9
PAINLEVEL_OUTOF10: 9

## 2025-08-10 ASSESSMENT — PAIN - FUNCTIONAL ASSESSMENT
PAIN_FUNCTIONAL_ASSESSMENT: 0-10

## 2025-08-11 ENCOUNTER — OFFICE VISIT (OUTPATIENT)
Dept: SURGERY | Age: 55
End: 2025-08-11

## 2025-08-11 VITALS — HEIGHT: 71 IN | BODY MASS INDEX: 26.6 KG/M2 | WEIGHT: 190 LBS

## 2025-08-11 DIAGNOSIS — L02.211 ABSCESS OF ABDOMINAL WALL: Primary | ICD-10-CM

## 2025-08-11 PROCEDURE — 99024 POSTOP FOLLOW-UP VISIT: CPT | Performed by: SURGERY

## 2025-08-11 RX ORDER — OXYCODONE HYDROCHLORIDE 5 MG/1
5 TABLET ORAL EVERY 6 HOURS PRN
Qty: 20 TABLET | Refills: 0 | Status: SHIPPED | OUTPATIENT
Start: 2025-08-11 | End: 2025-08-16

## 2025-08-18 ENCOUNTER — TELEPHONE (OUTPATIENT)
Dept: VASCULAR SURGERY | Age: 55
End: 2025-08-18

## 2025-08-18 DIAGNOSIS — L02.211 ABSCESS OF ABDOMINAL WALL: Primary | ICD-10-CM

## 2025-08-18 RX ORDER — OXYCODONE AND ACETAMINOPHEN 5; 325 MG/1; MG/1
1 TABLET ORAL EVERY 6 HOURS PRN
Qty: 20 TABLET | Refills: 0 | Status: ON HOLD | OUTPATIENT
Start: 2025-08-18 | End: 2025-08-23

## 2025-08-20 ENCOUNTER — HOSPITAL ENCOUNTER (INPATIENT)
Age: 55
LOS: 5 days | Discharge: HOME | DRG: 336 | End: 2025-08-25
Attending: SURGERY | Admitting: SURGERY
Payer: COMMERCIAL

## 2025-08-20 ENCOUNTER — APPOINTMENT (OUTPATIENT)
Dept: CT IMAGING | Age: 55
DRG: 336 | End: 2025-08-20
Payer: COMMERCIAL

## 2025-08-20 DIAGNOSIS — R10.9 INTRACTABLE ABDOMINAL PAIN: Primary | ICD-10-CM

## 2025-08-20 DIAGNOSIS — L02.211 ABSCESS OF ABDOMINAL WALL: ICD-10-CM

## 2025-08-20 PROBLEM — R10.84 GENERALIZED ABDOMINAL PAIN: Status: ACTIVE | Noted: 2025-08-20

## 2025-08-20 LAB
ALBUMIN SERPL-MCNC: 3.8 G/DL (ref 3.4–5)
ALBUMIN/GLOB SERPL: 1.3 {RATIO} (ref 1.1–2.2)
ALP SERPL-CCNC: 117 U/L (ref 40–129)
ALT SERPL-CCNC: 7 U/L (ref 10–40)
ANION GAP SERPL CALCULATED.3IONS-SCNC: 9 MMOL/L (ref 3–16)
AST SERPL-CCNC: 15 U/L (ref 15–37)
BACTERIA URNS QL MICRO: NORMAL /HPF
BASOPHILS # BLD: 0.2 K/UL (ref 0–0.2)
BASOPHILS NFR BLD: 2.6 %
BILIRUB SERPL-MCNC: 0.4 MG/DL (ref 0–1)
BILIRUB UR QL STRIP.AUTO: NEGATIVE
BUN SERPL-MCNC: 9 MG/DL (ref 7–20)
CALCIUM SERPL-MCNC: 9.2 MG/DL (ref 8.3–10.6)
CHLORIDE SERPL-SCNC: 105 MMOL/L (ref 99–110)
CLARITY UR: CLEAR
CO2 SERPL-SCNC: 26 MMOL/L (ref 21–32)
COLOR UR: YELLOW
CREAT SERPL-MCNC: 0.9 MG/DL (ref 0.9–1.3)
DEPRECATED RDW RBC AUTO: 18.5 % (ref 12.4–15.4)
EOSINOPHIL # BLD: 0.3 K/UL (ref 0–0.6)
EOSINOPHIL NFR BLD: 4.8 %
EPI CELLS #/AREA URNS AUTO: 0 /HPF (ref 0–5)
GFR SERPLBLD CREATININE-BSD FMLA CKD-EPI: >90 ML/MIN/{1.73_M2}
GLUCOSE SERPL-MCNC: 95 MG/DL (ref 70–99)
GLUCOSE UR STRIP.AUTO-MCNC: NEGATIVE MG/DL
HCT VFR BLD AUTO: 35.7 % (ref 40.5–52.5)
HGB BLD-MCNC: 11.9 G/DL (ref 13.5–17.5)
HGB UR QL STRIP.AUTO: NEGATIVE
HYALINE CASTS #/AREA URNS AUTO: 0 /LPF (ref 0–8)
KETONES UR STRIP.AUTO-MCNC: NEGATIVE MG/DL
LACTATE BLDV-SCNC: 0.6 MMOL/L (ref 0.4–2)
LEUKOCYTE ESTERASE UR QL STRIP.AUTO: ABNORMAL
LIPASE SERPL-CCNC: 18 U/L (ref 13–60)
LYMPHOCYTES # BLD: 1.9 K/UL (ref 1–5.1)
LYMPHOCYTES NFR BLD: 31.5 %
MCH RBC QN AUTO: 28.5 PG (ref 26–34)
MCHC RBC AUTO-ENTMCNC: 33.4 G/DL (ref 31–36)
MCV RBC AUTO: 85.4 FL (ref 80–100)
MONOCYTES # BLD: 0.5 K/UL (ref 0–1.3)
MONOCYTES NFR BLD: 8.2 %
NEUTROPHILS # BLD: 3.2 K/UL (ref 1.7–7.7)
NEUTROPHILS NFR BLD: 52.9 %
NITRITE UR QL STRIP.AUTO: NEGATIVE
PH UR STRIP.AUTO: 6 [PH] (ref 5–8)
PLATELET # BLD AUTO: 331 K/UL (ref 135–450)
PMV BLD AUTO: 8.9 FL (ref 5–10.5)
POTASSIUM SERPL-SCNC: 4 MMOL/L (ref 3.5–5.1)
PROT SERPL-MCNC: 6.7 G/DL (ref 6.4–8.2)
PROT UR STRIP.AUTO-MCNC: NEGATIVE MG/DL
RBC # BLD AUTO: 4.18 M/UL (ref 4.2–5.9)
RBC CLUMPS #/AREA URNS AUTO: 0 /HPF (ref 0–4)
SODIUM SERPL-SCNC: 140 MMOL/L (ref 136–145)
SP GR UR STRIP.AUTO: 1.01 (ref 1–1.03)
UA COMPLETE W REFLEX CULTURE PNL UR: ABNORMAL
UA DIPSTICK W REFLEX MICRO PNL UR: YES
URN SPEC COLLECT METH UR: ABNORMAL
UROBILINOGEN UR STRIP-ACNC: 1 E.U./DL
WBC # BLD AUTO: 6.1 K/UL (ref 4–11)
WBC #/AREA URNS AUTO: 1 /HPF (ref 0–5)

## 2025-08-20 PROCEDURE — 6360000002 HC RX W HCPCS: Performed by: SURGERY

## 2025-08-20 PROCEDURE — 93005 ELECTROCARDIOGRAM TRACING: CPT

## 2025-08-20 PROCEDURE — 81001 URINALYSIS AUTO W/SCOPE: CPT

## 2025-08-20 PROCEDURE — 6370000000 HC RX 637 (ALT 250 FOR IP): Performed by: SURGERY

## 2025-08-20 PROCEDURE — 83690 ASSAY OF LIPASE: CPT

## 2025-08-20 PROCEDURE — 99285 EMERGENCY DEPT VISIT HI MDM: CPT

## 2025-08-20 PROCEDURE — 74177 CT ABD & PELVIS W/CONTRAST: CPT

## 2025-08-20 PROCEDURE — 2580000003 HC RX 258: Performed by: SURGERY

## 2025-08-20 PROCEDURE — 85025 COMPLETE CBC W/AUTO DIFF WBC: CPT

## 2025-08-20 PROCEDURE — 83605 ASSAY OF LACTIC ACID: CPT

## 2025-08-20 PROCEDURE — 6360000004 HC RX CONTRAST MEDICATION

## 2025-08-20 PROCEDURE — 1200000000 HC SEMI PRIVATE

## 2025-08-20 PROCEDURE — 2580000003 HC RX 258

## 2025-08-20 PROCEDURE — 6360000002 HC RX W HCPCS

## 2025-08-20 PROCEDURE — 96376 TX/PRO/DX INJ SAME DRUG ADON: CPT

## 2025-08-20 PROCEDURE — 96375 TX/PRO/DX INJ NEW DRUG ADDON: CPT

## 2025-08-20 PROCEDURE — 96374 THER/PROPH/DIAG INJ IV PUSH: CPT

## 2025-08-20 PROCEDURE — 80053 COMPREHEN METABOLIC PANEL: CPT

## 2025-08-20 RX ORDER — IOPAMIDOL 755 MG/ML
75 INJECTION, SOLUTION INTRAVASCULAR
Status: COMPLETED | OUTPATIENT
Start: 2025-08-20 | End: 2025-08-20

## 2025-08-20 RX ORDER — TRAZODONE HYDROCHLORIDE 100 MG/1
200 TABLET ORAL NIGHTLY
Status: DISCONTINUED | OUTPATIENT
Start: 2025-08-21 | End: 2025-08-25 | Stop reason: HOSPADM

## 2025-08-20 RX ORDER — ONDANSETRON 4 MG/1
4 TABLET, ORALLY DISINTEGRATING ORAL EVERY 8 HOURS PRN
Status: DISCONTINUED | OUTPATIENT
Start: 2025-08-20 | End: 2025-08-25 | Stop reason: HOSPADM

## 2025-08-20 RX ORDER — POTASSIUM CHLORIDE 1500 MG/1
40 TABLET, EXTENDED RELEASE ORAL PRN
Status: DISCONTINUED | OUTPATIENT
Start: 2025-08-20 | End: 2025-08-25 | Stop reason: HOSPADM

## 2025-08-20 RX ORDER — ONDANSETRON 4 MG/1
4 TABLET, ORALLY DISINTEGRATING ORAL 3 TIMES DAILY PRN
Status: DISCONTINUED | OUTPATIENT
Start: 2025-08-20 | End: 2025-08-20

## 2025-08-20 RX ORDER — 0.9 % SODIUM CHLORIDE 0.9 %
1000 INTRAVENOUS SOLUTION INTRAVENOUS ONCE
Status: COMPLETED | OUTPATIENT
Start: 2025-08-20 | End: 2025-08-20

## 2025-08-20 RX ORDER — MORPHINE SULFATE 2 MG/ML
2 INJECTION, SOLUTION INTRAMUSCULAR; INTRAVENOUS ONCE
Refills: 0 | Status: COMPLETED | OUTPATIENT
Start: 2025-08-20 | End: 2025-08-20

## 2025-08-20 RX ORDER — ONDANSETRON 2 MG/ML
4 INJECTION INTRAMUSCULAR; INTRAVENOUS EVERY 6 HOURS PRN
Status: DISCONTINUED | OUTPATIENT
Start: 2025-08-20 | End: 2025-08-25 | Stop reason: HOSPADM

## 2025-08-20 RX ORDER — OXYCODONE AND ACETAMINOPHEN 5; 325 MG/1; MG/1
1 TABLET ORAL EVERY 6 HOURS PRN
Status: DISCONTINUED | OUTPATIENT
Start: 2025-08-20 | End: 2025-08-22

## 2025-08-20 RX ORDER — SODIUM CHLORIDE 9 MG/ML
INJECTION, SOLUTION INTRAVENOUS CONTINUOUS
Status: ACTIVE | OUTPATIENT
Start: 2025-08-21 | End: 2025-08-21

## 2025-08-20 RX ORDER — SODIUM CHLORIDE 0.9 % (FLUSH) 0.9 %
5-40 SYRINGE (ML) INJECTION EVERY 12 HOURS SCHEDULED
Status: DISCONTINUED | OUTPATIENT
Start: 2025-08-21 | End: 2025-08-25 | Stop reason: HOSPADM

## 2025-08-20 RX ORDER — ENOXAPARIN SODIUM 100 MG/ML
40 INJECTION SUBCUTANEOUS DAILY
Status: DISCONTINUED | OUTPATIENT
Start: 2025-08-21 | End: 2025-08-25 | Stop reason: HOSPADM

## 2025-08-20 RX ORDER — ACETAMINOPHEN 650 MG/1
650 SUPPOSITORY RECTAL EVERY 6 HOURS PRN
Status: DISCONTINUED | OUTPATIENT
Start: 2025-08-20 | End: 2025-08-23

## 2025-08-20 RX ORDER — POTASSIUM CHLORIDE 7.45 MG/ML
10 INJECTION INTRAVENOUS PRN
Status: DISCONTINUED | OUTPATIENT
Start: 2025-08-20 | End: 2025-08-25 | Stop reason: HOSPADM

## 2025-08-20 RX ORDER — GABAPENTIN 300 MG/1
600 CAPSULE ORAL 4 TIMES DAILY
Status: DISCONTINUED | OUTPATIENT
Start: 2025-08-21 | End: 2025-08-25 | Stop reason: HOSPADM

## 2025-08-20 RX ORDER — PANTOPRAZOLE SODIUM 40 MG/1
40 TABLET, DELAYED RELEASE ORAL
Status: DISCONTINUED | OUTPATIENT
Start: 2025-08-21 | End: 2025-08-25 | Stop reason: HOSPADM

## 2025-08-20 RX ORDER — ACETAMINOPHEN 325 MG/1
650 TABLET ORAL EVERY 6 HOURS PRN
Status: DISCONTINUED | OUTPATIENT
Start: 2025-08-20 | End: 2025-08-23

## 2025-08-20 RX ORDER — SODIUM CHLORIDE 9 MG/ML
INJECTION, SOLUTION INTRAVENOUS PRN
Status: DISCONTINUED | OUTPATIENT
Start: 2025-08-20 | End: 2025-08-25 | Stop reason: HOSPADM

## 2025-08-20 RX ORDER — ONDANSETRON 2 MG/ML
4 INJECTION INTRAMUSCULAR; INTRAVENOUS ONCE
Status: COMPLETED | OUTPATIENT
Start: 2025-08-20 | End: 2025-08-20

## 2025-08-20 RX ORDER — SODIUM CHLORIDE 0.9 % (FLUSH) 0.9 %
5-40 SYRINGE (ML) INJECTION PRN
Status: DISCONTINUED | OUTPATIENT
Start: 2025-08-20 | End: 2025-08-25 | Stop reason: HOSPADM

## 2025-08-20 RX ORDER — POLYETHYLENE GLYCOL 3350 17 G/17G
17 POWDER, FOR SOLUTION ORAL DAILY PRN
Status: DISCONTINUED | OUTPATIENT
Start: 2025-08-20 | End: 2025-08-22 | Stop reason: SDUPTHER

## 2025-08-20 RX ORDER — MAGNESIUM SULFATE IN WATER 40 MG/ML
2000 INJECTION, SOLUTION INTRAVENOUS PRN
Status: DISCONTINUED | OUTPATIENT
Start: 2025-08-20 | End: 2025-08-25 | Stop reason: HOSPADM

## 2025-08-20 RX ADMIN — SODIUM CHLORIDE: 0.9 INJECTION, SOLUTION INTRAVENOUS at 23:55

## 2025-08-20 RX ADMIN — IOPAMIDOL 75 ML: 755 INJECTION, SOLUTION INTRAVENOUS at 19:45

## 2025-08-20 RX ADMIN — TRAZODONE HYDROCHLORIDE 200 MG: 100 TABLET ORAL at 23:56

## 2025-08-20 RX ADMIN — MORPHINE SULFATE 2 MG: 2 INJECTION, SOLUTION INTRAMUSCULAR; INTRAVENOUS at 22:05

## 2025-08-20 RX ADMIN — MORPHINE SULFATE 2 MG: 2 INJECTION, SOLUTION INTRAMUSCULAR; INTRAVENOUS at 19:52

## 2025-08-20 RX ADMIN — SODIUM CHLORIDE 1000 ML: 0.9 INJECTION, SOLUTION INTRAVENOUS at 19:57

## 2025-08-20 RX ADMIN — ONDANSETRON 4 MG: 2 INJECTION, SOLUTION INTRAMUSCULAR; INTRAVENOUS at 20:09

## 2025-08-20 RX ADMIN — GABAPENTIN 600 MG: 300 CAPSULE ORAL at 23:55

## 2025-08-20 RX ADMIN — HYDROMORPHONE HYDROCHLORIDE 0.5 MG: 1 INJECTION, SOLUTION INTRAMUSCULAR; INTRAVENOUS; SUBCUTANEOUS at 23:55

## 2025-08-20 ASSESSMENT — PAIN - FUNCTIONAL ASSESSMENT
PAIN_FUNCTIONAL_ASSESSMENT: 0-10
PAIN_FUNCTIONAL_ASSESSMENT: 0-10
PAIN_FUNCTIONAL_ASSESSMENT: ACTIVITIES ARE NOT PREVENTED
PAIN_FUNCTIONAL_ASSESSMENT: 0-10

## 2025-08-20 ASSESSMENT — PAIN SCALES - GENERAL
PAINLEVEL_OUTOF10: 9

## 2025-08-20 ASSESSMENT — PAIN DESCRIPTION - ONSET: ONSET: ON-GOING

## 2025-08-20 ASSESSMENT — PAIN DESCRIPTION - DESCRIPTORS
DESCRIPTORS: STABBING;ACHING
DESCRIPTORS: ACHING;SHARP
DESCRIPTORS: ACHING;THROBBING
DESCRIPTORS: SHARP

## 2025-08-20 ASSESSMENT — PAIN DESCRIPTION - PAIN TYPE
TYPE: ACUTE PAIN
TYPE: ACUTE PAIN

## 2025-08-20 ASSESSMENT — PAIN DESCRIPTION - LOCATION
LOCATION: ABDOMEN

## 2025-08-20 ASSESSMENT — PAIN DESCRIPTION - FREQUENCY: FREQUENCY: CONTINUOUS

## 2025-08-20 ASSESSMENT — PAIN DESCRIPTION - ORIENTATION
ORIENTATION: MID

## 2025-08-21 ENCOUNTER — ANESTHESIA EVENT (OUTPATIENT)
Dept: OPERATING ROOM | Age: 55
End: 2025-08-21
Payer: COMMERCIAL

## 2025-08-21 PROBLEM — R10.9 INTRACTABLE ABDOMINAL PAIN: Status: ACTIVE | Noted: 2025-08-21

## 2025-08-21 LAB
ANION GAP SERPL CALCULATED.3IONS-SCNC: 7 MMOL/L (ref 3–16)
BASOPHILS # BLD: 0.1 K/UL (ref 0–0.2)
BASOPHILS NFR BLD: 1.9 %
BUN SERPL-MCNC: 8 MG/DL (ref 7–20)
CALCIUM SERPL-MCNC: 8.4 MG/DL (ref 8.3–10.6)
CHLORIDE SERPL-SCNC: 108 MMOL/L (ref 99–110)
CO2 SERPL-SCNC: 26 MMOL/L (ref 21–32)
CREAT SERPL-MCNC: 0.8 MG/DL (ref 0.9–1.3)
DEPRECATED RDW RBC AUTO: 18.3 % (ref 12.4–15.4)
EKG ATRIAL RATE: 55 BPM
EKG DIAGNOSIS: NORMAL
EKG P AXIS: 70 DEGREES
EKG P-R INTERVAL: 170 MS
EKG Q-T INTERVAL: 432 MS
EKG QRS DURATION: 88 MS
EKG QTC CALCULATION (BAZETT): 413 MS
EKG R AXIS: 54 DEGREES
EKG T AXIS: 45 DEGREES
EKG VENTRICULAR RATE: 55 BPM
EOSINOPHIL # BLD: 0.4 K/UL (ref 0–0.6)
EOSINOPHIL NFR BLD: 6 %
GFR SERPLBLD CREATININE-BSD FMLA CKD-EPI: >90 ML/MIN/{1.73_M2}
GLUCOSE SERPL-MCNC: 77 MG/DL (ref 70–99)
HCT VFR BLD AUTO: 35.8 % (ref 40.5–52.5)
HGB BLD-MCNC: 11.5 G/DL (ref 13.5–17.5)
LYMPHOCYTES # BLD: 2.9 K/UL (ref 1–5.1)
LYMPHOCYTES NFR BLD: 44.8 %
MCH RBC QN AUTO: 27.6 PG (ref 26–34)
MCHC RBC AUTO-ENTMCNC: 32.2 G/DL (ref 31–36)
MCV RBC AUTO: 85.7 FL (ref 80–100)
MONOCYTES # BLD: 0.5 K/UL (ref 0–1.3)
MONOCYTES NFR BLD: 7.7 %
NEUTROPHILS # BLD: 2.6 K/UL (ref 1.7–7.7)
NEUTROPHILS NFR BLD: 39.6 %
PLATELET # BLD AUTO: 337 K/UL (ref 135–450)
PMV BLD AUTO: 9 FL (ref 5–10.5)
POTASSIUM SERPL-SCNC: 3.9 MMOL/L (ref 3.5–5.1)
RBC # BLD AUTO: 4.18 M/UL (ref 4.2–5.9)
SODIUM SERPL-SCNC: 141 MMOL/L (ref 136–145)
WBC # BLD AUTO: 6.5 K/UL (ref 4–11)

## 2025-08-21 PROCEDURE — 80048 BASIC METABOLIC PNL TOTAL CA: CPT

## 2025-08-21 PROCEDURE — 6370000000 HC RX 637 (ALT 250 FOR IP): Performed by: SURGERY

## 2025-08-21 PROCEDURE — 6360000002 HC RX W HCPCS: Performed by: SURGERY

## 2025-08-21 PROCEDURE — 1200000000 HC SEMI PRIVATE

## 2025-08-21 PROCEDURE — 93010 ELECTROCARDIOGRAM REPORT: CPT | Performed by: INTERNAL MEDICINE

## 2025-08-21 PROCEDURE — APPSS15 APP SPLIT SHARED TIME 0-15 MINUTES: Performed by: PHYSICIAN ASSISTANT

## 2025-08-21 PROCEDURE — 2500000003 HC RX 250 WO HCPCS: Performed by: SURGERY

## 2025-08-21 PROCEDURE — 36415 COLL VENOUS BLD VENIPUNCTURE: CPT

## 2025-08-21 PROCEDURE — 99222 1ST HOSP IP/OBS MODERATE 55: CPT | Performed by: SURGERY

## 2025-08-21 PROCEDURE — 94760 N-INVAS EAR/PLS OXIMETRY 1: CPT

## 2025-08-21 PROCEDURE — APPNB15 APP NON BILLABLE TIME 0-15 MINS: Performed by: PHYSICIAN ASSISTANT

## 2025-08-21 PROCEDURE — 85025 COMPLETE CBC W/AUTO DIFF WBC: CPT

## 2025-08-21 RX ADMIN — HYDROMORPHONE HYDROCHLORIDE 0.5 MG: 1 INJECTION, SOLUTION INTRAMUSCULAR; INTRAVENOUS; SUBCUTANEOUS at 13:17

## 2025-08-21 RX ADMIN — HYDROMORPHONE HYDROCHLORIDE 0.5 MG: 1 INJECTION, SOLUTION INTRAMUSCULAR; INTRAVENOUS; SUBCUTANEOUS at 02:54

## 2025-08-21 RX ADMIN — GABAPENTIN 600 MG: 300 CAPSULE ORAL at 13:16

## 2025-08-21 RX ADMIN — HYDROMORPHONE HYDROCHLORIDE 0.5 MG: 1 INJECTION, SOLUTION INTRAMUSCULAR; INTRAVENOUS; SUBCUTANEOUS at 17:26

## 2025-08-21 RX ADMIN — HYDROMORPHONE HYDROCHLORIDE 0.5 MG: 1 INJECTION, SOLUTION INTRAMUSCULAR; INTRAVENOUS; SUBCUTANEOUS at 21:29

## 2025-08-21 RX ADMIN — HYDROMORPHONE HYDROCHLORIDE 0.5 MG: 1 INJECTION, SOLUTION INTRAMUSCULAR; INTRAVENOUS; SUBCUTANEOUS at 19:25

## 2025-08-21 RX ADMIN — TRAZODONE HYDROCHLORIDE 200 MG: 100 TABLET ORAL at 21:28

## 2025-08-21 RX ADMIN — GABAPENTIN 600 MG: 300 CAPSULE ORAL at 17:25

## 2025-08-21 RX ADMIN — HYDROMORPHONE HYDROCHLORIDE 0.5 MG: 1 INJECTION, SOLUTION INTRAMUSCULAR; INTRAVENOUS; SUBCUTANEOUS at 11:21

## 2025-08-21 RX ADMIN — HYDROMORPHONE HYDROCHLORIDE 0.5 MG: 1 INJECTION, SOLUTION INTRAMUSCULAR; INTRAVENOUS; SUBCUTANEOUS at 09:15

## 2025-08-21 RX ADMIN — HYDROMORPHONE HYDROCHLORIDE 0.5 MG: 1 INJECTION, SOLUTION INTRAMUSCULAR; INTRAVENOUS; SUBCUTANEOUS at 23:59

## 2025-08-21 RX ADMIN — HYDROMORPHONE HYDROCHLORIDE 0.5 MG: 1 INJECTION, SOLUTION INTRAMUSCULAR; INTRAVENOUS; SUBCUTANEOUS at 06:01

## 2025-08-21 RX ADMIN — FLUOXETINE HYDROCHLORIDE 20 MG: 20 CAPSULE ORAL at 09:07

## 2025-08-21 RX ADMIN — SODIUM CHLORIDE, PRESERVATIVE FREE 10 ML: 5 INJECTION INTRAVENOUS at 21:31

## 2025-08-21 RX ADMIN — PANTOPRAZOLE SODIUM 40 MG: 40 TABLET, DELAYED RELEASE ORAL at 15:21

## 2025-08-21 RX ADMIN — HYDROMORPHONE HYDROCHLORIDE 0.5 MG: 1 INJECTION, SOLUTION INTRAMUSCULAR; INTRAVENOUS; SUBCUTANEOUS at 15:20

## 2025-08-21 RX ADMIN — ONDANSETRON 4 MG: 2 INJECTION, SOLUTION INTRAMUSCULAR; INTRAVENOUS at 21:42

## 2025-08-21 RX ADMIN — GABAPENTIN 600 MG: 300 CAPSULE ORAL at 21:28

## 2025-08-21 RX ADMIN — GABAPENTIN 600 MG: 300 CAPSULE ORAL at 09:07

## 2025-08-21 ASSESSMENT — PAIN - FUNCTIONAL ASSESSMENT
PAIN_FUNCTIONAL_ASSESSMENT: ACTIVITIES ARE NOT PREVENTED
PAIN_FUNCTIONAL_ASSESSMENT: ACTIVITIES ARE NOT PREVENTED
PAIN_FUNCTIONAL_ASSESSMENT: 0-10
PAIN_FUNCTIONAL_ASSESSMENT: ACTIVITIES ARE NOT PREVENTED
PAIN_FUNCTIONAL_ASSESSMENT: 0-10
PAIN_FUNCTIONAL_ASSESSMENT: 0-10
PAIN_FUNCTIONAL_ASSESSMENT: ACTIVITIES ARE NOT PREVENTED
PAIN_FUNCTIONAL_ASSESSMENT: 0-10
PAIN_FUNCTIONAL_ASSESSMENT: ACTIVITIES ARE NOT PREVENTED
PAIN_FUNCTIONAL_ASSESSMENT: 0-10

## 2025-08-21 ASSESSMENT — PAIN DESCRIPTION - LOCATION
LOCATION: ABDOMEN

## 2025-08-21 ASSESSMENT — PAIN DESCRIPTION - DESCRIPTORS
DESCRIPTORS: SHARP;ACHING
DESCRIPTORS: THROBBING
DESCRIPTORS: ACHING;DISCOMFORT
DESCRIPTORS: DISCOMFORT;ACHING
DESCRIPTORS: ACHING;DISCOMFORT
DESCRIPTORS: ACHING;DISCOMFORT
DESCRIPTORS: ACHING
DESCRIPTORS: ACHING;DISCOMFORT
DESCRIPTORS: PRESSURE

## 2025-08-21 ASSESSMENT — PAIN DESCRIPTION - ORIENTATION
ORIENTATION: LOWER
ORIENTATION: LOWER
ORIENTATION: MID
ORIENTATION: MID;RIGHT;LEFT
ORIENTATION: LOWER;MID

## 2025-08-21 ASSESSMENT — PAIN SCALES - GENERAL
PAINLEVEL_OUTOF10: 7
PAINLEVEL_OUTOF10: 5
PAINLEVEL_OUTOF10: 7
PAINLEVEL_OUTOF10: 8
PAINLEVEL_OUTOF10: 0
PAINLEVEL_OUTOF10: 5
PAINLEVEL_OUTOF10: 9
PAINLEVEL_OUTOF10: 6
PAINLEVEL_OUTOF10: 7
PAINLEVEL_OUTOF10: 8
PAINLEVEL_OUTOF10: 7
PAINLEVEL_OUTOF10: 0
PAINLEVEL_OUTOF10: 7
PAINLEVEL_OUTOF10: 6

## 2025-08-22 ENCOUNTER — ANESTHESIA (OUTPATIENT)
Dept: OPERATING ROOM | Age: 55
End: 2025-08-22
Payer: COMMERCIAL

## 2025-08-22 LAB
ANION GAP SERPL CALCULATED.3IONS-SCNC: 8 MMOL/L (ref 3–16)
BASOPHILS # BLD: 0.1 K/UL (ref 0–0.2)
BASOPHILS NFR BLD: 2 %
BUN SERPL-MCNC: 6 MG/DL (ref 7–20)
CALCIUM SERPL-MCNC: 8.4 MG/DL (ref 8.3–10.6)
CHLORIDE SERPL-SCNC: 107 MMOL/L (ref 99–110)
CO2 SERPL-SCNC: 27 MMOL/L (ref 21–32)
CREAT SERPL-MCNC: 0.9 MG/DL (ref 0.9–1.3)
DEPRECATED RDW RBC AUTO: 18 % (ref 12.4–15.4)
EOSINOPHIL # BLD: 0.4 K/UL (ref 0–0.6)
EOSINOPHIL NFR BLD: 5.7 %
GFR SERPLBLD CREATININE-BSD FMLA CKD-EPI: >90 ML/MIN/{1.73_M2}
GLUCOSE BLD-MCNC: 89 MG/DL (ref 70–99)
GLUCOSE BLD-MCNC: 95 MG/DL (ref 70–99)
GLUCOSE SERPL-MCNC: 77 MG/DL (ref 70–99)
HCT VFR BLD AUTO: 32.6 % (ref 40.5–52.5)
HGB BLD-MCNC: 11 G/DL (ref 13.5–17.5)
LYMPHOCYTES # BLD: 2.8 K/UL (ref 1–5.1)
LYMPHOCYTES NFR BLD: 43.2 %
MCH RBC QN AUTO: 28.7 PG (ref 26–34)
MCHC RBC AUTO-ENTMCNC: 33.7 G/DL (ref 31–36)
MCV RBC AUTO: 85.1 FL (ref 80–100)
MONOCYTES # BLD: 0.6 K/UL (ref 0–1.3)
MONOCYTES NFR BLD: 8.7 %
NEUTROPHILS # BLD: 2.6 K/UL (ref 1.7–7.7)
NEUTROPHILS NFR BLD: 40.4 %
PERFORMED ON: NORMAL
PERFORMED ON: NORMAL
PLATELET # BLD AUTO: 288 K/UL (ref 135–450)
PMV BLD AUTO: 8.7 FL (ref 5–10.5)
POTASSIUM SERPL-SCNC: 3.9 MMOL/L (ref 3.5–5.1)
RBC # BLD AUTO: 3.83 M/UL (ref 4.2–5.9)
SODIUM SERPL-SCNC: 142 MMOL/L (ref 136–145)
WBC # BLD AUTO: 6.5 K/UL (ref 4–11)

## 2025-08-22 PROCEDURE — 80048 BASIC METABOLIC PNL TOTAL CA: CPT

## 2025-08-22 PROCEDURE — 2580000003 HC RX 258: Performed by: SURGERY

## 2025-08-22 PROCEDURE — 85025 COMPLETE CBC W/AUTO DIFF WBC: CPT

## 2025-08-22 PROCEDURE — 6360000002 HC RX W HCPCS: Performed by: ANESTHESIOLOGY

## 2025-08-22 PROCEDURE — 6360000002 HC RX W HCPCS: Performed by: SURGERY

## 2025-08-22 PROCEDURE — 2580000003 HC RX 258: Performed by: NURSE ANESTHETIST, CERTIFIED REGISTERED

## 2025-08-22 PROCEDURE — 7100000001 HC PACU RECOVERY - ADDTL 15 MIN: Performed by: SURGERY

## 2025-08-22 PROCEDURE — 2500000003 HC RX 250 WO HCPCS: Performed by: NURSE ANESTHETIST, CERTIFIED REGISTERED

## 2025-08-22 PROCEDURE — 2500000003 HC RX 250 WO HCPCS: Performed by: SURGERY

## 2025-08-22 PROCEDURE — 36415 COLL VENOUS BLD VENIPUNCTURE: CPT

## 2025-08-22 PROCEDURE — 6370000000 HC RX 637 (ALT 250 FOR IP): Performed by: SURGERY

## 2025-08-22 PROCEDURE — 3700000001 HC ADD 15 MINUTES (ANESTHESIA): Performed by: SURGERY

## 2025-08-22 PROCEDURE — 2700000000 HC OXYGEN THERAPY PER DAY

## 2025-08-22 PROCEDURE — 2709999900 HC NON-CHARGEABLE SUPPLY: Performed by: SURGERY

## 2025-08-22 PROCEDURE — 0JB80ZZ EXCISION OF ABDOMEN SUBCUTANEOUS TISSUE AND FASCIA, OPEN APPROACH: ICD-10-PCS | Performed by: SURGERY

## 2025-08-22 PROCEDURE — 3700000000 HC ANESTHESIA ATTENDED CARE: Performed by: SURGERY

## 2025-08-22 PROCEDURE — 3600000004 HC SURGERY LEVEL 4 BASE: Performed by: SURGERY

## 2025-08-22 PROCEDURE — 6360000002 HC RX W HCPCS: Performed by: NURSE ANESTHETIST, CERTIFIED REGISTERED

## 2025-08-22 PROCEDURE — 3600000014 HC SURGERY LEVEL 4 ADDTL 15MIN: Performed by: SURGERY

## 2025-08-22 PROCEDURE — 1200000000 HC SEMI PRIVATE

## 2025-08-22 PROCEDURE — 0DN80ZZ RELEASE SMALL INTESTINE, OPEN APPROACH: ICD-10-PCS | Performed by: SURGERY

## 2025-08-22 PROCEDURE — 7100000000 HC PACU RECOVERY - FIRST 15 MIN: Performed by: SURGERY

## 2025-08-22 PROCEDURE — 94761 N-INVAS EAR/PLS OXIMETRY MLT: CPT

## 2025-08-22 RX ORDER — SODIUM CHLORIDE 9 MG/ML
INJECTION, SOLUTION INTRAVENOUS
Status: DISCONTINUED | OUTPATIENT
Start: 2025-08-22 | End: 2025-08-22 | Stop reason: SDUPTHER

## 2025-08-22 RX ORDER — SODIUM CHLORIDE 0.9 % (FLUSH) 0.9 %
5-40 SYRINGE (ML) INJECTION PRN
Status: DISCONTINUED | OUTPATIENT
Start: 2025-08-22 | End: 2025-08-22 | Stop reason: HOSPADM

## 2025-08-22 RX ORDER — SODIUM CHLORIDE 9 MG/ML
INJECTION, SOLUTION INTRAVENOUS PRN
Status: DISCONTINUED | OUTPATIENT
Start: 2025-08-22 | End: 2025-08-22 | Stop reason: HOSPADM

## 2025-08-22 RX ORDER — ONDANSETRON 2 MG/ML
4 INJECTION INTRAMUSCULAR; INTRAVENOUS
Status: DISCONTINUED | OUTPATIENT
Start: 2025-08-22 | End: 2025-08-22

## 2025-08-22 RX ORDER — ROCURONIUM BROMIDE 10 MG/ML
INJECTION, SOLUTION INTRAVENOUS
Status: DISCONTINUED | OUTPATIENT
Start: 2025-08-22 | End: 2025-08-22 | Stop reason: SDUPTHER

## 2025-08-22 RX ORDER — SODIUM CHLORIDE 9 MG/ML
INJECTION, SOLUTION INTRAVENOUS PRN
Status: DISCONTINUED | OUTPATIENT
Start: 2025-08-22 | End: 2025-08-22

## 2025-08-22 RX ORDER — FENTANYL CITRATE 50 UG/ML
25 INJECTION, SOLUTION INTRAMUSCULAR; INTRAVENOUS EVERY 5 MIN PRN
Status: DISCONTINUED | OUTPATIENT
Start: 2025-08-22 | End: 2025-08-22

## 2025-08-22 RX ORDER — MAGNESIUM HYDROXIDE 1200 MG/15ML
LIQUID ORAL CONTINUOUS PRN
Status: DISCONTINUED | OUTPATIENT
Start: 2025-08-22 | End: 2025-08-22 | Stop reason: HOSPADM

## 2025-08-22 RX ORDER — FENTANYL CITRATE 50 UG/ML
50 INJECTION, SOLUTION INTRAMUSCULAR; INTRAVENOUS ONCE
Status: COMPLETED | OUTPATIENT
Start: 2025-08-22 | End: 2025-08-22

## 2025-08-22 RX ORDER — MEPERIDINE HYDROCHLORIDE 50 MG/ML
12.5 INJECTION INTRAMUSCULAR; INTRAVENOUS; SUBCUTANEOUS EVERY 5 MIN PRN
Status: DISCONTINUED | OUTPATIENT
Start: 2025-08-22 | End: 2025-08-22

## 2025-08-22 RX ORDER — POLYETHYLENE GLYCOL 3350 17 G/17G
17 POWDER, FOR SOLUTION ORAL DAILY PRN
Status: DISCONTINUED | OUTPATIENT
Start: 2025-08-22 | End: 2025-08-23

## 2025-08-22 RX ORDER — SODIUM CHLORIDE 0.9 % (FLUSH) 0.9 %
5-40 SYRINGE (ML) INJECTION PRN
Status: DISCONTINUED | OUTPATIENT
Start: 2025-08-22 | End: 2025-08-22

## 2025-08-22 RX ORDER — FENTANYL CITRATE 50 UG/ML
INJECTION, SOLUTION INTRAMUSCULAR; INTRAVENOUS
Status: DISCONTINUED | OUTPATIENT
Start: 2025-08-22 | End: 2025-08-22 | Stop reason: SDUPTHER

## 2025-08-22 RX ORDER — DEXAMETHASONE SODIUM PHOSPHATE 4 MG/ML
INJECTION, SOLUTION INTRA-ARTICULAR; INTRALESIONAL; INTRAMUSCULAR; INTRAVENOUS; SOFT TISSUE
Status: DISCONTINUED | OUTPATIENT
Start: 2025-08-22 | End: 2025-08-22 | Stop reason: SDUPTHER

## 2025-08-22 RX ORDER — ONDANSETRON 2 MG/ML
INJECTION INTRAMUSCULAR; INTRAVENOUS
Status: DISCONTINUED | OUTPATIENT
Start: 2025-08-22 | End: 2025-08-22 | Stop reason: SDUPTHER

## 2025-08-22 RX ORDER — SODIUM CHLORIDE 0.9 % (FLUSH) 0.9 %
5-40 SYRINGE (ML) INJECTION EVERY 12 HOURS SCHEDULED
Status: DISCONTINUED | OUTPATIENT
Start: 2025-08-22 | End: 2025-08-22 | Stop reason: HOSPADM

## 2025-08-22 RX ORDER — LIDOCAINE HYDROCHLORIDE 20 MG/ML
INJECTION, SOLUTION EPIDURAL; INFILTRATION; INTRACAUDAL; PERINEURAL
Status: DISCONTINUED | OUTPATIENT
Start: 2025-08-22 | End: 2025-08-22 | Stop reason: SDUPTHER

## 2025-08-22 RX ORDER — PROPOFOL 10 MG/ML
INJECTION, EMULSION INTRAVENOUS
Status: DISCONTINUED | OUTPATIENT
Start: 2025-08-22 | End: 2025-08-22 | Stop reason: SDUPTHER

## 2025-08-22 RX ORDER — BUPIVACAINE HYDROCHLORIDE 5 MG/ML
INJECTION, SOLUTION EPIDURAL; INTRACAUDAL; PERINEURAL
Status: DISCONTINUED | OUTPATIENT
Start: 2025-08-22 | End: 2025-08-22 | Stop reason: HOSPADM

## 2025-08-22 RX ORDER — FENTANYL CITRATE 50 UG/ML
50 INJECTION, SOLUTION INTRAMUSCULAR; INTRAVENOUS EVERY 5 MIN PRN
Status: DISCONTINUED | OUTPATIENT
Start: 2025-08-22 | End: 2025-08-22

## 2025-08-22 RX ORDER — SUCCINYLCHOLINE/SOD CL,ISO/PF 200MG/10ML
SYRINGE (ML) INTRAVENOUS
Status: DISCONTINUED | OUTPATIENT
Start: 2025-08-22 | End: 2025-08-22 | Stop reason: SDUPTHER

## 2025-08-22 RX ORDER — SODIUM CHLORIDE 0.9 % (FLUSH) 0.9 %
5-40 SYRINGE (ML) INJECTION EVERY 12 HOURS SCHEDULED
Status: DISCONTINUED | OUTPATIENT
Start: 2025-08-22 | End: 2025-08-22

## 2025-08-22 RX ORDER — MIDAZOLAM HYDROCHLORIDE 1 MG/ML
INJECTION, SOLUTION INTRAMUSCULAR; INTRAVENOUS
Status: DISCONTINUED | OUTPATIENT
Start: 2025-08-22 | End: 2025-08-22 | Stop reason: SDUPTHER

## 2025-08-22 RX ADMIN — FENTANYL CITRATE 100 MCG: 50 INJECTION INTRAMUSCULAR; INTRAVENOUS at 08:52

## 2025-08-22 RX ADMIN — GABAPENTIN 600 MG: 300 CAPSULE ORAL at 20:25

## 2025-08-22 RX ADMIN — Medication: at 11:19

## 2025-08-22 RX ADMIN — ROCURONIUM BROMIDE 10 MG: 10 SOLUTION INTRAVENOUS at 09:39

## 2025-08-22 RX ADMIN — ONDANSETRON 4 MG: 2 INJECTION INTRAMUSCULAR; INTRAVENOUS at 09:39

## 2025-08-22 RX ADMIN — SUGAMMADEX 200 MG: 100 INJECTION, SOLUTION INTRAVENOUS at 10:03

## 2025-08-22 RX ADMIN — Medication 140 MG: at 08:42

## 2025-08-22 RX ADMIN — ENOXAPARIN SODIUM 40 MG: 100 INJECTION SUBCUTANEOUS at 08:17

## 2025-08-22 RX ADMIN — SODIUM CHLORIDE 2000 MG: 9 INJECTION, SOLUTION INTRAVENOUS at 08:34

## 2025-08-22 RX ADMIN — FENTANYL CITRATE 50 MCG: 50 INJECTION INTRAMUSCULAR; INTRAVENOUS at 08:19

## 2025-08-22 RX ADMIN — TRAZODONE HYDROCHLORIDE 200 MG: 100 TABLET ORAL at 21:31

## 2025-08-22 RX ADMIN — HYDROMORPHONE HYDROCHLORIDE 0.5 MG: 1 INJECTION, SOLUTION INTRAMUSCULAR; INTRAVENOUS; SUBCUTANEOUS at 06:07

## 2025-08-22 RX ADMIN — SODIUM CHLORIDE: 9 INJECTION, SOLUTION INTRAVENOUS at 08:34

## 2025-08-22 RX ADMIN — ROCURONIUM BROMIDE 40 MG: 10 SOLUTION INTRAVENOUS at 08:46

## 2025-08-22 RX ADMIN — HYDROMORPHONE HYDROCHLORIDE 1 MG: 1 INJECTION, SOLUTION INTRAMUSCULAR; INTRAVENOUS; SUBCUTANEOUS at 09:14

## 2025-08-22 RX ADMIN — MIDAZOLAM 2 MG: 1 INJECTION INTRAMUSCULAR; INTRAVENOUS at 08:34

## 2025-08-22 RX ADMIN — HYDROMORPHONE HYDROCHLORIDE 0.5 MG: 1 INJECTION, SOLUTION INTRAMUSCULAR; INTRAVENOUS; SUBCUTANEOUS at 04:00

## 2025-08-22 RX ADMIN — LIDOCAINE HYDROCHLORIDE 100 MG: 20 INJECTION, SOLUTION EPIDURAL; INFILTRATION; INTRACAUDAL; PERINEURAL at 08:42

## 2025-08-22 RX ADMIN — SODIUM CHLORIDE: 9 INJECTION, SOLUTION INTRAVENOUS at 09:49

## 2025-08-22 RX ADMIN — DEXAMETHASONE SODIUM PHOSPHATE 10 MG: 4 INJECTION, SOLUTION INTRAMUSCULAR; INTRAVENOUS at 08:42

## 2025-08-22 RX ADMIN — PROPOFOL 200 MG: 10 INJECTION, EMULSION INTRAVENOUS at 08:42

## 2025-08-22 ASSESSMENT — PAIN DESCRIPTION - FREQUENCY
FREQUENCY: CONTINUOUS

## 2025-08-22 ASSESSMENT — PAIN DESCRIPTION - ORIENTATION
ORIENTATION: MID

## 2025-08-22 ASSESSMENT — PAIN SCALES - GENERAL
PAINLEVEL_OUTOF10: 8
PAINLEVEL_OUTOF10: 6
PAINLEVEL_OUTOF10: 8
PAINLEVEL_OUTOF10: 7
PAINLEVEL_OUTOF10: 5
PAINLEVEL_OUTOF10: 6
PAINLEVEL_OUTOF10: 8
PAINLEVEL_OUTOF10: 0
PAINLEVEL_OUTOF10: 5
PAINLEVEL_OUTOF10: 8

## 2025-08-22 ASSESSMENT — PAIN - FUNCTIONAL ASSESSMENT
PAIN_FUNCTIONAL_ASSESSMENT: 0-10
PAIN_FUNCTIONAL_ASSESSMENT: PREVENTS OR INTERFERES SOME ACTIVE ACTIVITIES AND ADLS
PAIN_FUNCTIONAL_ASSESSMENT: ACTIVITIES ARE NOT PREVENTED
PAIN_FUNCTIONAL_ASSESSMENT: 0-10
PAIN_FUNCTIONAL_ASSESSMENT: PREVENTS OR INTERFERES SOME ACTIVE ACTIVITIES AND ADLS
PAIN_FUNCTIONAL_ASSESSMENT: 0-10

## 2025-08-22 ASSESSMENT — PAIN DESCRIPTION - LOCATION
LOCATION: ABDOMEN

## 2025-08-22 ASSESSMENT — PAIN DESCRIPTION - DESCRIPTORS
DESCRIPTORS: STABBING;THROBBING
DESCRIPTORS: THROBBING
DESCRIPTORS: STABBING
DESCRIPTORS: STABBING
DESCRIPTORS: ACHING

## 2025-08-22 ASSESSMENT — PAIN DESCRIPTION - PAIN TYPE
TYPE: SURGICAL PAIN

## 2025-08-22 ASSESSMENT — PAIN DESCRIPTION - ONSET
ONSET: ON-GOING

## 2025-08-22 ASSESSMENT — ENCOUNTER SYMPTOMS: SHORTNESS OF BREATH: 0

## 2025-08-22 ASSESSMENT — LIFESTYLE VARIABLES: SMOKING_STATUS: 1

## 2025-08-23 LAB
ANION GAP SERPL CALCULATED.3IONS-SCNC: 9 MMOL/L (ref 3–16)
BASOPHILS # BLD: 0.1 K/UL (ref 0–0.2)
BASOPHILS NFR BLD: 0.6 %
BUN SERPL-MCNC: 7 MG/DL (ref 7–20)
CALCIUM SERPL-MCNC: 8 MG/DL (ref 8.3–10.6)
CHLORIDE SERPL-SCNC: 104 MMOL/L (ref 99–110)
CO2 SERPL-SCNC: 24 MMOL/L (ref 21–32)
CREAT SERPL-MCNC: 0.9 MG/DL (ref 0.9–1.3)
DEPRECATED RDW RBC AUTO: 17.9 % (ref 12.4–15.4)
EOSINOPHIL # BLD: 0 K/UL (ref 0–0.6)
EOSINOPHIL NFR BLD: 0.3 %
GFR SERPLBLD CREATININE-BSD FMLA CKD-EPI: >90 ML/MIN/{1.73_M2}
GLUCOSE SERPL-MCNC: 115 MG/DL (ref 70–99)
HCT VFR BLD AUTO: 33 % (ref 40.5–52.5)
HGB BLD-MCNC: 11 G/DL (ref 13.5–17.5)
LYMPHOCYTES # BLD: 1.8 K/UL (ref 1–5.1)
LYMPHOCYTES NFR BLD: 16.9 %
MCH RBC QN AUTO: 28.1 PG (ref 26–34)
MCHC RBC AUTO-ENTMCNC: 33.2 G/DL (ref 31–36)
MCV RBC AUTO: 84.8 FL (ref 80–100)
MONOCYTES # BLD: 1 K/UL (ref 0–1.3)
MONOCYTES NFR BLD: 9.6 %
NEUTROPHILS # BLD: 7.7 K/UL (ref 1.7–7.7)
NEUTROPHILS NFR BLD: 72.6 %
PLATELET # BLD AUTO: 271 K/UL (ref 135–450)
PMV BLD AUTO: 9.5 FL (ref 5–10.5)
POTASSIUM SERPL-SCNC: 3.8 MMOL/L (ref 3.5–5.1)
RBC # BLD AUTO: 3.9 M/UL (ref 4.2–5.9)
SODIUM SERPL-SCNC: 137 MMOL/L (ref 136–145)
WBC # BLD AUTO: 10.6 K/UL (ref 4–11)

## 2025-08-23 PROCEDURE — 6370000000 HC RX 637 (ALT 250 FOR IP): Performed by: STUDENT IN AN ORGANIZED HEALTH CARE EDUCATION/TRAINING PROGRAM

## 2025-08-23 PROCEDURE — 99024 POSTOP FOLLOW-UP VISIT: CPT | Performed by: STUDENT IN AN ORGANIZED HEALTH CARE EDUCATION/TRAINING PROGRAM

## 2025-08-23 PROCEDURE — 6360000002 HC RX W HCPCS: Performed by: SURGERY

## 2025-08-23 PROCEDURE — 6370000000 HC RX 637 (ALT 250 FOR IP): Performed by: SURGERY

## 2025-08-23 PROCEDURE — 85025 COMPLETE CBC W/AUTO DIFF WBC: CPT

## 2025-08-23 PROCEDURE — 2500000003 HC RX 250 WO HCPCS: Performed by: SURGERY

## 2025-08-23 PROCEDURE — 80048 BASIC METABOLIC PNL TOTAL CA: CPT

## 2025-08-23 PROCEDURE — 2700000000 HC OXYGEN THERAPY PER DAY

## 2025-08-23 PROCEDURE — 1200000000 HC SEMI PRIVATE

## 2025-08-23 PROCEDURE — 6360000002 HC RX W HCPCS: Performed by: STUDENT IN AN ORGANIZED HEALTH CARE EDUCATION/TRAINING PROGRAM

## 2025-08-23 PROCEDURE — 36415 COLL VENOUS BLD VENIPUNCTURE: CPT

## 2025-08-23 PROCEDURE — 94761 N-INVAS EAR/PLS OXIMETRY MLT: CPT

## 2025-08-23 RX ORDER — METHOCARBAMOL 750 MG/1
750 TABLET, FILM COATED ORAL 4 TIMES DAILY
Status: DISCONTINUED | OUTPATIENT
Start: 2025-08-23 | End: 2025-08-25 | Stop reason: HOSPADM

## 2025-08-23 RX ORDER — ACETAMINOPHEN 500 MG
1000 TABLET ORAL EVERY 8 HOURS SCHEDULED
Status: DISCONTINUED | OUTPATIENT
Start: 2025-08-23 | End: 2025-08-25 | Stop reason: HOSPADM

## 2025-08-23 RX ORDER — IBUPROFEN 400 MG/1
400 TABLET, FILM COATED ORAL EVERY 8 HOURS
Status: DISCONTINUED | OUTPATIENT
Start: 2025-08-23 | End: 2025-08-25 | Stop reason: HOSPADM

## 2025-08-23 RX ORDER — OXYCODONE HYDROCHLORIDE 5 MG/1
5 TABLET ORAL EVERY 4 HOURS PRN
Refills: 0 | Status: DISCONTINUED | OUTPATIENT
Start: 2025-08-23 | End: 2025-08-24

## 2025-08-23 RX ADMIN — PANTOPRAZOLE SODIUM 40 MG: 40 TABLET, DELAYED RELEASE ORAL at 16:21

## 2025-08-23 RX ADMIN — OXYCODONE 5 MG: 5 TABLET ORAL at 20:33

## 2025-08-23 RX ADMIN — ACETAMINOPHEN 1000 MG: 500 TABLET ORAL at 21:49

## 2025-08-23 RX ADMIN — TRAZODONE HYDROCHLORIDE 200 MG: 100 TABLET ORAL at 20:33

## 2025-08-23 RX ADMIN — HYDROMORPHONE HYDROCHLORIDE 0.5 MG: 1 INJECTION, SOLUTION INTRAMUSCULAR; INTRAVENOUS; SUBCUTANEOUS at 21:48

## 2025-08-23 RX ADMIN — ENOXAPARIN SODIUM 40 MG: 100 INJECTION SUBCUTANEOUS at 08:23

## 2025-08-23 RX ADMIN — METHOCARBAMOL 750 MG: 750 TABLET ORAL at 15:26

## 2025-08-23 RX ADMIN — HYDROMORPHONE HYDROCHLORIDE 0.5 MG: 1 INJECTION, SOLUTION INTRAMUSCULAR; INTRAVENOUS; SUBCUTANEOUS at 12:15

## 2025-08-23 RX ADMIN — HYDROMORPHONE HYDROCHLORIDE 0.5 MG: 1 INJECTION, SOLUTION INTRAMUSCULAR; INTRAVENOUS; SUBCUTANEOUS at 18:41

## 2025-08-23 RX ADMIN — PANTOPRAZOLE SODIUM 40 MG: 40 TABLET, DELAYED RELEASE ORAL at 07:00

## 2025-08-23 RX ADMIN — GABAPENTIN 600 MG: 300 CAPSULE ORAL at 20:34

## 2025-08-23 RX ADMIN — GABAPENTIN 600 MG: 300 CAPSULE ORAL at 15:26

## 2025-08-23 RX ADMIN — FLUOXETINE HYDROCHLORIDE 20 MG: 20 CAPSULE ORAL at 08:24

## 2025-08-23 RX ADMIN — HYDROMORPHONE HYDROCHLORIDE 0.5 MG: 1 INJECTION, SOLUTION INTRAMUSCULAR; INTRAVENOUS; SUBCUTANEOUS at 15:27

## 2025-08-23 RX ADMIN — IBUPROFEN 400 MG: 400 TABLET, FILM COATED ORAL at 20:34

## 2025-08-23 RX ADMIN — ACETAMINOPHEN 1000 MG: 500 TABLET ORAL at 15:26

## 2025-08-23 RX ADMIN — SODIUM CHLORIDE, PRESERVATIVE FREE 10 ML: 5 INJECTION INTRAVENOUS at 20:34

## 2025-08-23 RX ADMIN — METHOCARBAMOL 750 MG: 750 TABLET ORAL at 20:34

## 2025-08-23 RX ADMIN — GABAPENTIN 600 MG: 300 CAPSULE ORAL at 08:24

## 2025-08-23 ASSESSMENT — PAIN DESCRIPTION - LOCATION
LOCATION: ABDOMEN
LOCATION: ABDOMEN
LOCATION: ABDOMEN;INCISION
LOCATION: ABDOMEN;INCISION
LOCATION: INCISION
LOCATION: ABDOMEN

## 2025-08-23 ASSESSMENT — PAIN - FUNCTIONAL ASSESSMENT
PAIN_FUNCTIONAL_ASSESSMENT: 0-10
PAIN_FUNCTIONAL_ASSESSMENT: PREVENTS OR INTERFERES SOME ACTIVE ACTIVITIES AND ADLS
PAIN_FUNCTIONAL_ASSESSMENT: PREVENTS OR INTERFERES SOME ACTIVE ACTIVITIES AND ADLS
PAIN_FUNCTIONAL_ASSESSMENT: 0-10
PAIN_FUNCTIONAL_ASSESSMENT: 0-10

## 2025-08-23 ASSESSMENT — PAIN DESCRIPTION - DESCRIPTORS
DESCRIPTORS: ACHING;NUMBNESS;SORE
DESCRIPTORS: ACHING;DISCOMFORT
DESCRIPTORS: ACHING
DESCRIPTORS: ACHING;CRAMPING;SHARP

## 2025-08-23 ASSESSMENT — PAIN SCALES - GENERAL
PAINLEVEL_OUTOF10: 8
PAINLEVEL_OUTOF10: 7
PAINLEVEL_OUTOF10: 9
PAINLEVEL_OUTOF10: 7
PAINLEVEL_OUTOF10: 8
PAINLEVEL_OUTOF10: 7
PAINLEVEL_OUTOF10: 9
PAINLEVEL_OUTOF10: 8
PAINLEVEL_OUTOF10: 7

## 2025-08-23 ASSESSMENT — PAIN DESCRIPTION - ORIENTATION
ORIENTATION: MID
ORIENTATION: RIGHT;LEFT;MID
ORIENTATION: RIGHT;LEFT
ORIENTATION: LEFT;RIGHT;LOWER;MID

## 2025-08-24 PROCEDURE — 6370000000 HC RX 637 (ALT 250 FOR IP): Performed by: SURGERY

## 2025-08-24 PROCEDURE — 6370000000 HC RX 637 (ALT 250 FOR IP): Performed by: STUDENT IN AN ORGANIZED HEALTH CARE EDUCATION/TRAINING PROGRAM

## 2025-08-24 PROCEDURE — 1200000000 HC SEMI PRIVATE

## 2025-08-24 PROCEDURE — 94760 N-INVAS EAR/PLS OXIMETRY 1: CPT

## 2025-08-24 PROCEDURE — 2500000003 HC RX 250 WO HCPCS: Performed by: SURGERY

## 2025-08-24 PROCEDURE — 6360000002 HC RX W HCPCS: Performed by: STUDENT IN AN ORGANIZED HEALTH CARE EDUCATION/TRAINING PROGRAM

## 2025-08-24 PROCEDURE — 99024 POSTOP FOLLOW-UP VISIT: CPT | Performed by: STUDENT IN AN ORGANIZED HEALTH CARE EDUCATION/TRAINING PROGRAM

## 2025-08-24 PROCEDURE — 6360000002 HC RX W HCPCS: Performed by: SURGERY

## 2025-08-24 RX ORDER — OXYCODONE HYDROCHLORIDE 5 MG/1
5 TABLET ORAL EVERY 4 HOURS PRN
Refills: 0 | Status: DISCONTINUED | OUTPATIENT
Start: 2025-08-24 | End: 2025-08-25 | Stop reason: HOSPADM

## 2025-08-24 RX ORDER — OXYCODONE HYDROCHLORIDE 10 MG/1
10 TABLET ORAL EVERY 4 HOURS PRN
Refills: 0 | Status: DISCONTINUED | OUTPATIENT
Start: 2025-08-24 | End: 2025-08-25 | Stop reason: HOSPADM

## 2025-08-24 RX ADMIN — HYDROMORPHONE HYDROCHLORIDE 0.5 MG: 1 INJECTION, SOLUTION INTRAMUSCULAR; INTRAVENOUS; SUBCUTANEOUS at 16:36

## 2025-08-24 RX ADMIN — GABAPENTIN 600 MG: 300 CAPSULE ORAL at 12:15

## 2025-08-24 RX ADMIN — HYDROMORPHONE HYDROCHLORIDE 0.5 MG: 1 INJECTION, SOLUTION INTRAMUSCULAR; INTRAVENOUS; SUBCUTANEOUS at 09:59

## 2025-08-24 RX ADMIN — METHOCARBAMOL 750 MG: 750 TABLET ORAL at 08:09

## 2025-08-24 RX ADMIN — METHOCARBAMOL 750 MG: 750 TABLET ORAL at 20:02

## 2025-08-24 RX ADMIN — ENOXAPARIN SODIUM 40 MG: 100 INJECTION SUBCUTANEOUS at 08:09

## 2025-08-24 RX ADMIN — PANTOPRAZOLE SODIUM 40 MG: 40 TABLET, DELAYED RELEASE ORAL at 05:59

## 2025-08-24 RX ADMIN — OXYCODONE 5 MG: 5 TABLET ORAL at 08:08

## 2025-08-24 RX ADMIN — OXYCODONE HYDROCHLORIDE 10 MG: 10 TABLET ORAL at 17:38

## 2025-08-24 RX ADMIN — GABAPENTIN 600 MG: 300 CAPSULE ORAL at 16:37

## 2025-08-24 RX ADMIN — HYDROMORPHONE HYDROCHLORIDE 0.5 MG: 1 INJECTION, SOLUTION INTRAMUSCULAR; INTRAVENOUS; SUBCUTANEOUS at 06:32

## 2025-08-24 RX ADMIN — PANTOPRAZOLE SODIUM 40 MG: 40 TABLET, DELAYED RELEASE ORAL at 16:37

## 2025-08-24 RX ADMIN — ACETAMINOPHEN 1000 MG: 500 TABLET ORAL at 21:53

## 2025-08-24 RX ADMIN — TRAZODONE HYDROCHLORIDE 200 MG: 100 TABLET ORAL at 20:02

## 2025-08-24 RX ADMIN — METHOCARBAMOL 750 MG: 750 TABLET ORAL at 16:37

## 2025-08-24 RX ADMIN — OXYCODONE 5 MG: 5 TABLET ORAL at 12:15

## 2025-08-24 RX ADMIN — FLUOXETINE HYDROCHLORIDE 20 MG: 20 CAPSULE ORAL at 08:09

## 2025-08-24 RX ADMIN — SODIUM CHLORIDE, PRESERVATIVE FREE 10 ML: 5 INJECTION INTRAVENOUS at 08:10

## 2025-08-24 RX ADMIN — METHOCARBAMOL 750 MG: 750 TABLET ORAL at 12:15

## 2025-08-24 RX ADMIN — HYDROMORPHONE HYDROCHLORIDE 0.5 MG: 1 INJECTION, SOLUTION INTRAMUSCULAR; INTRAVENOUS; SUBCUTANEOUS at 02:56

## 2025-08-24 RX ADMIN — OXYCODONE HYDROCHLORIDE 10 MG: 10 TABLET ORAL at 21:54

## 2025-08-24 RX ADMIN — ACETAMINOPHEN 1000 MG: 500 TABLET ORAL at 05:58

## 2025-08-24 RX ADMIN — GABAPENTIN 600 MG: 300 CAPSULE ORAL at 20:02

## 2025-08-24 RX ADMIN — GABAPENTIN 600 MG: 300 CAPSULE ORAL at 08:09

## 2025-08-24 RX ADMIN — HYDROMORPHONE HYDROCHLORIDE 0.5 MG: 1 INJECTION, SOLUTION INTRAMUSCULAR; INTRAVENOUS; SUBCUTANEOUS at 13:29

## 2025-08-24 RX ADMIN — SODIUM CHLORIDE, PRESERVATIVE FREE 10 ML: 5 INJECTION INTRAVENOUS at 20:02

## 2025-08-24 RX ADMIN — ACETAMINOPHEN 1000 MG: 500 TABLET ORAL at 13:29

## 2025-08-24 RX ADMIN — HYDROMORPHONE HYDROCHLORIDE 0.5 MG: 1 INJECTION, SOLUTION INTRAMUSCULAR; INTRAVENOUS; SUBCUTANEOUS at 19:58

## 2025-08-24 ASSESSMENT — PAIN SCALES - GENERAL
PAINLEVEL_OUTOF10: 7
PAINLEVEL_OUTOF10: 8
PAINLEVEL_OUTOF10: 8
PAINLEVEL_OUTOF10: 7
PAINLEVEL_OUTOF10: 8
PAINLEVEL_OUTOF10: 7
PAINLEVEL_OUTOF10: 7
PAINLEVEL_OUTOF10: 8
PAINLEVEL_OUTOF10: 7
PAINLEVEL_OUTOF10: 7

## 2025-08-24 ASSESSMENT — PAIN DESCRIPTION - PAIN TYPE: TYPE: SURGICAL PAIN

## 2025-08-24 ASSESSMENT — PAIN - FUNCTIONAL ASSESSMENT
PAIN_FUNCTIONAL_ASSESSMENT: PREVENTS OR INTERFERES SOME ACTIVE ACTIVITIES AND ADLS
PAIN_FUNCTIONAL_ASSESSMENT: 0-10
PAIN_FUNCTIONAL_ASSESSMENT: PREVENTS OR INTERFERES SOME ACTIVE ACTIVITIES AND ADLS
PAIN_FUNCTIONAL_ASSESSMENT: 0-10

## 2025-08-24 ASSESSMENT — PAIN DESCRIPTION - ONSET: ONSET: ON-GOING

## 2025-08-24 ASSESSMENT — PAIN DESCRIPTION - DESCRIPTORS
DESCRIPTORS: ACHING;DISCOMFORT

## 2025-08-24 ASSESSMENT — PAIN DESCRIPTION - LOCATION
LOCATION: ABDOMEN

## 2025-08-24 ASSESSMENT — PAIN DESCRIPTION - FREQUENCY: FREQUENCY: CONTINUOUS

## 2025-08-25 VITALS
TEMPERATURE: 99 F | BODY MASS INDEX: 25.62 KG/M2 | DIASTOLIC BLOOD PRESSURE: 67 MMHG | RESPIRATION RATE: 18 BRPM | OXYGEN SATURATION: 95 % | SYSTOLIC BLOOD PRESSURE: 111 MMHG | HEIGHT: 71 IN | HEART RATE: 62 BPM | WEIGHT: 182.98 LBS

## 2025-08-25 PROCEDURE — 6370000000 HC RX 637 (ALT 250 FOR IP): Performed by: SURGERY

## 2025-08-25 PROCEDURE — 2500000003 HC RX 250 WO HCPCS: Performed by: SURGERY

## 2025-08-25 PROCEDURE — APPNB15 APP NON BILLABLE TIME 0-15 MINS: Performed by: PHYSICIAN ASSISTANT

## 2025-08-25 PROCEDURE — 6370000000 HC RX 637 (ALT 250 FOR IP): Performed by: STUDENT IN AN ORGANIZED HEALTH CARE EDUCATION/TRAINING PROGRAM

## 2025-08-25 PROCEDURE — 94760 N-INVAS EAR/PLS OXIMETRY 1: CPT

## 2025-08-25 PROCEDURE — APPSS15 APP SPLIT SHARED TIME 0-15 MINUTES: Performed by: PHYSICIAN ASSISTANT

## 2025-08-25 PROCEDURE — 6360000002 HC RX W HCPCS: Performed by: SURGERY

## 2025-08-25 RX ORDER — OXYCODONE AND ACETAMINOPHEN 5; 325 MG/1; MG/1
1 TABLET ORAL EVERY 6 HOURS PRN
Qty: 28 TABLET | Refills: 0 | Status: SHIPPED | OUTPATIENT
Start: 2025-08-25 | End: 2025-08-31 | Stop reason: SDUPTHER

## 2025-08-25 RX ADMIN — ENOXAPARIN SODIUM 40 MG: 100 INJECTION SUBCUTANEOUS at 08:26

## 2025-08-25 RX ADMIN — ACETAMINOPHEN 1000 MG: 500 TABLET ORAL at 06:31

## 2025-08-25 RX ADMIN — FLUOXETINE HYDROCHLORIDE 20 MG: 20 CAPSULE ORAL at 08:26

## 2025-08-25 RX ADMIN — METHOCARBAMOL 750 MG: 750 TABLET ORAL at 08:26

## 2025-08-25 RX ADMIN — METHOCARBAMOL 750 MG: 750 TABLET ORAL at 13:32

## 2025-08-25 RX ADMIN — ACETAMINOPHEN 1000 MG: 500 TABLET ORAL at 13:33

## 2025-08-25 RX ADMIN — OXYCODONE HYDROCHLORIDE 10 MG: 10 TABLET ORAL at 08:43

## 2025-08-25 RX ADMIN — GABAPENTIN 600 MG: 300 CAPSULE ORAL at 13:32

## 2025-08-25 RX ADMIN — GABAPENTIN 600 MG: 300 CAPSULE ORAL at 08:26

## 2025-08-25 RX ADMIN — OXYCODONE HYDROCHLORIDE 10 MG: 10 TABLET ORAL at 13:33

## 2025-08-25 RX ADMIN — PANTOPRAZOLE SODIUM 40 MG: 40 TABLET, DELAYED RELEASE ORAL at 06:32

## 2025-08-25 RX ADMIN — SODIUM CHLORIDE, PRESERVATIVE FREE 10 ML: 5 INJECTION INTRAVENOUS at 08:27

## 2025-08-25 RX ADMIN — OXYCODONE HYDROCHLORIDE 10 MG: 10 TABLET ORAL at 04:17

## 2025-08-25 ASSESSMENT — PAIN SCALES - GENERAL
PAINLEVEL_OUTOF10: 7
PAINLEVEL_OUTOF10: 6

## 2025-08-25 ASSESSMENT — PAIN - FUNCTIONAL ASSESSMENT
PAIN_FUNCTIONAL_ASSESSMENT: 0-10

## 2025-08-25 ASSESSMENT — PAIN DESCRIPTION - ORIENTATION: ORIENTATION: MID

## 2025-08-25 ASSESSMENT — PAIN DESCRIPTION - DESCRIPTORS: DESCRIPTORS: ACHING

## 2025-08-25 ASSESSMENT — PAIN DESCRIPTION - LOCATION: LOCATION: ABDOMEN

## 2025-08-26 ENCOUNTER — CARE COORDINATION (OUTPATIENT)
Dept: CARE COORDINATION | Age: 55
End: 2025-08-26

## 2025-08-27 ENCOUNTER — CARE COORDINATION (OUTPATIENT)
Dept: CARE COORDINATION | Age: 55
End: 2025-08-27

## 2025-08-31 DIAGNOSIS — L02.211 ABSCESS OF ABDOMINAL WALL: ICD-10-CM

## 2025-08-31 RX ORDER — OXYCODONE AND ACETAMINOPHEN 5; 325 MG/1; MG/1
1 TABLET ORAL EVERY 6 HOURS PRN
Qty: 28 TABLET | Refills: 0 | Status: SHIPPED | OUTPATIENT
Start: 2025-08-31 | End: 2025-09-07

## 2025-09-04 ENCOUNTER — OFFICE VISIT (OUTPATIENT)
Dept: SURGERY | Age: 55
End: 2025-09-04

## 2025-09-04 VITALS — HEIGHT: 71 IN | WEIGHT: 182 LBS | BODY MASS INDEX: 25.48 KG/M2

## 2025-09-04 DIAGNOSIS — L02.211 ABSCESS OF ABDOMINAL WALL: Primary | ICD-10-CM

## 2025-09-04 PROBLEM — T81.31XA WOUND DEHISCENCE, SURGICAL, INITIAL ENCOUNTER: Status: ACTIVE | Noted: 2025-09-04

## 2025-09-04 PROCEDURE — 99024 POSTOP FOLLOW-UP VISIT: CPT | Performed by: SURGERY

## (undated) DEVICE — PENCIL ELECTROSURG 10FT CORD BTN E2516H

## (undated) DEVICE — 3M™ COBAN™ NL STERILE NON-LATEX SELF-ADHERENT WRAP, 2083S, 3 IN X 5 YD (7,5 CM X 4,5 M), 24 ROLLS/CASE: Brand: 3M™ COBAN™

## (undated) DEVICE — COTTON UNDERCAST PADDING,CRIMPED FINISH: Brand: WEBRIL

## (undated) DEVICE — NEEDLE HYPO 25GA L1.5IN BVL ORIENTED ECLIPSE

## (undated) DEVICE — CLEANER,CAUTERY TIP,2X2",STERILE: Brand: MEDLINE

## (undated) DEVICE — SYRINGE MED 10ML LUERLOCK TIP W/O SFTY DISP

## (undated) DEVICE — SYRINGE MED 30ML STD CLR PLAS LUERLOCK TIP N CTRL DISP

## (undated) DEVICE — Z DISCONTINUED NO SUB IDED BLADE SAW W12.7XL60MM THK0.89MM S STL OSC STABLECUT

## (undated) DEVICE — 4-PORT MANIFOLD: Brand: NEPTUNE 2

## (undated) DEVICE — SOLUTION PREP POVIDONE IOD FOR SKIN MUCOUS MEM PRIOR TO

## (undated) DEVICE — COVER LT HNDL BLU PLAS

## (undated) DEVICE — PREMIUM DRY TRAY LF: Brand: MEDLINE INDUSTRIES, INC.

## (undated) DEVICE — ENDOSCOPY KIT: Brand: MEDLINE INDUSTRIES, INC.

## (undated) DEVICE — DRAPE,REIN 53X77,STERILE: Brand: MEDLINE

## (undated) DEVICE — STERILE TOTAL KNEE DRAPE PACK: Brand: CARDINAL HEALTH

## (undated) DEVICE — DECANTER FLD L3IN WHT FOR VI TO VI TRNSF

## (undated) DEVICE — GLOVE ORANGE PI 8   MSG9080

## (undated) DEVICE — Z INACTIVE USE 2660664 SOLUTION IRRIG 3000ML 0.9% SOD CHL USP UROMATIC PLAS CONT

## (undated) DEVICE — ELECTRODE PT RET AD L9FT HI MOIST COND ADH HYDRGEL CORDED

## (undated) DEVICE — ZIMMER® STERILE DISPOSABLE TOURNIQUET CUFF WITH PLC, DUAL PORT, SINGLE BLADDER, 42 IN. (107 CM)

## (undated) DEVICE — BASIC SINGLE BASIN 1-LF: Brand: MEDLINE INDUSTRIES, INC.

## (undated) DEVICE — DUAL CUT SAGITTAL BLADE

## (undated) DEVICE — BANDAGE COMPR W6INXL4.5YD LTWT E EC SGL LAYERED CLP CLSR

## (undated) DEVICE — GLOVE ORANGE PI 7   MSG9070

## (undated) DEVICE — Z DISCONTINUED USE 2716304 SUTURE STRATAFIX SPRL SZ 3-0 L12IN ABSRB UD FS-1 L24MM 3/8

## (undated) DEVICE — TOWEL,OR,DSP,ST,BLUE,STD,4/PK,20PK/CS: Brand: MEDLINE

## (undated) DEVICE — MERCY HEALTH WEST TURNOVER: Brand: MEDLINE INDUSTRIES, INC.

## (undated) DEVICE — FEMORAL CANAL TIP, IRRIGATION/SUCTION

## (undated) DEVICE — SUTURE STRATAFIX SPRL SZ 2 0 L14IN ABSRB UD MH L36MM 1 2 CIR SXMD2B401

## (undated) DEVICE — BANDAGE COMPR W6INXL10YD ST M E WHITE/BEIGE

## (undated) DEVICE — PAD,NON-ADHERENT,3X8,STERILE,LF,1/PK: Brand: MEDLINE

## (undated) DEVICE — NEEDLE HYPO 18GA L1.5IN THN WALL PIVOTING SHLD BVL ORIENTED

## (undated) DEVICE — SOLUTION IV 1000ML 0.9% SOD CHL FOR IRRIG PLAS CONT

## (undated) DEVICE — GLOVE ORANGE PI 7 1/2   MSG9075

## (undated) DEVICE — GLOVE ORANGE PI 8 1/2   MSG9085

## (undated) DEVICE — GLOVE,SURG,SENSICARE SLT,LF,PF,8.5: Brand: MEDLINE

## (undated) DEVICE — GOWN,REINF,POLY,AURORA,XLNG/XXL,STRL: Brand: MEDLINE

## (undated) DEVICE — SOLUTION IV IRRIG POUR BRL 0.9% SODIUM CHL 2F7124

## (undated) DEVICE — Device

## (undated) DEVICE — SHEET,DRAPE,53X77,STERILE: Brand: MEDLINE

## (undated) DEVICE — SUTURE VCRL + SZ 2-0 L27IN ABSRB CLR CT-1 1/2 CIR TAPERCUT VCP259H

## (undated) DEVICE — PAD,ABDOMINAL,8"X7.5",STERILE,LF,1/PK: Brand: MEDLINE

## (undated) DEVICE — GAUZE,SPONGE,2"X2",8PLY,STERILE,LF,2'S: Brand: MEDLINE

## (undated) DEVICE — COVER,TABLE,77X90,STERILE: Brand: MEDLINE

## (undated) DEVICE — COVER,MAYO STAND,STERILE: Brand: MEDLINE

## (undated) DEVICE — 19 IN KNEE IMMOBILIZER: Brand: DEROYAL

## (undated) DEVICE — TRANSFER SET 3": Brand: MEDLINE INDUSTRIES, INC.

## (undated) DEVICE — DECANTER BAG 9": Brand: MEDLINE INDUSTRIES, INC.

## (undated) DEVICE — ZIMMER® STERILE DISPOSABLE TOURNIQUET CUFF WITH PLC, DUAL PORT, SINGLE BLADDER, 34 IN. (86 CM)

## (undated) DEVICE — GLOVE SURG SZ 85 L12IN FNGR THK13MIL WHT ISOLEX POLYISOPRENE

## (undated) DEVICE — DRAPE,U/SHT,SPLIT,FILM,60X84,STERILE: Brand: MEDLINE

## (undated) DEVICE — MAJOR SET UP: Brand: MEDLINE INDUSTRIES, INC.

## (undated) DEVICE — BLADE SAW W12.5XL70MM THK1MM RECIP DBL SIDE OFFSET

## (undated) DEVICE — SOLUTION SCRB 4OZ 7.5% POVIDONE IOD ANTIMIC BTL

## (undated) DEVICE — HANDPIECE SET WITH HIGH FLOW TIP AND SUCTION TUBE: Brand: INTERPULSE

## (undated) DEVICE — SUTURE VCRL SZ 1 L18IN ABSRB UD L36MM CT-1 1/2 CIR J841D

## (undated) DEVICE — 1010 S-DRAPE TOWEL DRAPE 10/BX: Brand: STERI-DRAPE™

## (undated) DEVICE — SOLUTION PREP PAINT POV IOD FOR SKIN MUCOUS MEM

## (undated) DEVICE — STRIP,CLOSURE,WOUND,MEDI-STRIP,1/2X4: Brand: MEDLINE

## (undated) DEVICE — 3M™ TEGADERM™ TRANSPARENT FILM DRESSING FRAME STYLE, 1626W, 4 IN X 4-3/4 IN (10 CM X 12 CM), 50/CT 4CT/CASE: Brand: 3M™ TEGADERM™

## (undated) DEVICE — 3M™ STERI-DRAPE™ ISOLATION BAG, 10 PER CARTON / 4 CARTONS PER CASE, 1003: Brand: 3M™ STERI-DRAPE™

## (undated) DEVICE — BANDAGE COMPR W6INXL15YD WHT BGE POLY COT WV E HK LOOP CLSR

## (undated) DEVICE — STERILE PATIENT PROTECTIVE PAD FOR IMP® KNEE POSITIONERS & COHESIVE WRAP (10 / CASE): Brand: DE MAYO KNEE POSITIONER®

## (undated) DEVICE — HYPODERMIC SAFETY NEEDLE: Brand: MAGELLAN

## (undated) DEVICE — CEMENT BNE 40GM W/ GENT HI VISC RADPQ FOR REV SURG: Type: IMPLANTABLE DEVICE | Status: NON-FUNCTIONAL

## (undated) DEVICE — BANDAGE COMPR W4INXL15FT BGE E SGL LAYERED CLP CLSR

## (undated) DEVICE — GOWN SIRUS NONREIN XL W/TWL: Brand: MEDLINE INDUSTRIES, INC.

## (undated) DEVICE — GLOVE,SURG,SENSICARE SLT,LF,PF,8: Brand: MEDLINE

## (undated) DEVICE — CANISTER NEG PRSS 500ML WHT SENSATRAC TBNG CLMP CONN

## (undated) DEVICE — BANDAGE COMPR W6INXL12FT SMOOTH FOR LIMB EXSANG ESMARCH

## (undated) DEVICE — SNARES COLD OVAL 10MM THIN

## (undated) DEVICE — SPONGE LAP W18XL18IN WHT COT 4 PLY FLD STRUNG RADPQ DISP ST

## (undated) DEVICE — MATTRESS MAXI AIR TRNSF SGL PT USE DCS 37 45 48 52 75

## (undated) DEVICE — NEEDLE HYPO 22GA L1 1/2IN PIVOTING SHLD FOR LUERLOCK SYR

## (undated) DEVICE — GLOVE SURG SZ 85 CRM LTX FREE POLYISOPRENE POLYMER BEAD ANTI

## (undated) DEVICE — BAG WND LAV 1L CLR ETH ACET ACID SOD ACETT BENZALKONIUM CHL

## (undated) DEVICE — CHLORAPREP 26ML ORANGE

## (undated) DEVICE — ADHESIVE SKIN CLOSURE TOP 36 CC HI VISC DERMBND MINI

## (undated) DEVICE — Z CONVERTED USE 2271377 BANDAGE COMPR W6INXL5YD EC E REB

## (undated) DEVICE — T-DRAPE,EXTREMITY,STERILE: Brand: MEDLINE

## (undated) DEVICE — KIT STEREOTAXIC POD DISPOSABLE IASSIST

## (undated) DEVICE — BITE BLOCK ENDOSCP AD 60 FR W/ ADJ STRP PLAS GRN BLOX

## (undated) DEVICE — GOWN SURG XL L48IN BLU SMS NONREINFORCED VELC TIE 3 LEV

## (undated) DEVICE — SYSTEM SKIN CLSR 22CM DERMBND PRINEO

## (undated) DEVICE — SUTURE VCRL + SZ 3-0 L27IN ABSRB UD L26MM SH 1/2 CIR VCP416H

## (undated) DEVICE — DRESSING WND VAC MED GRANUFOAM SENSATRAC

## (undated) DEVICE — TOTAL BASIC PK

## (undated) DEVICE — SPONGE GZ W4XL4IN COT 12 PLY TYP VII WVN C FLD DSGN STERILE

## (undated) DEVICE — LOWER EXTREMITY: Brand: MEDLINE INDUSTRIES, INC.

## (undated) DEVICE — SYRINGE IRRIG 60ML SFT PLIABLE BLB EZ TO GRP 1 HND USE W/

## (undated) DEVICE — SUTURE ABSORBABLE MONOFILAMENT 1-0 OS8 14 IN STRATAFIX SPRL SXPD2B202

## (undated) DEVICE — SUTURE VICRYL + ABSORBABLE BRAIDED 3-0 12X18 IN COAT UD VCP110G

## (undated) DEVICE — Z DISCONTINUED BY MEDLINE USE 2711682 TRAY SKIN PREP DRY W/ PREM GLV

## (undated) DEVICE — DRESSING FOAM W4XL12IN AG SIL ADH ANTIMIC POSTOP OPTIFOAM

## (undated) DEVICE — DEVICE SECUREMENT AD W/ TRICOT ANCHR PD FOR F LTX SIL CATH

## (undated) DEVICE — TOTAL KNEE: Brand: MEDLINE INDUSTRIES, INC.

## (undated) DEVICE — MASTISOL ADHESIVE LIQ 2/3ML

## (undated) DEVICE — T5 HOOD WITH PEEL AWAY FACE SHIELD

## (undated) DEVICE — NEEDLE 25GA X 1.5 IN ECLIPSE

## (undated) DEVICE — SOLUTION IRRIG 2000ML 0.9% SOD CHL USP UROMATIC PLAS CONT

## (undated) DEVICE — DRAPE,T,LAPARO,TRANS,STERILE: Brand: MEDLINE

## (undated) DEVICE — SYRINGE MED 3ML CLR PLAS STD N CTRL LUERLOCK TIP DISP

## (undated) DEVICE — Device: Brand: POWER-FLO®

## (undated) DEVICE — SPONGE LAP W18XL18IN WHT COT 4 PLY FLD STRUNG RADPQ DISP ST 2 PER PACK

## (undated) DEVICE — TOWEL SURG W17XL27IN BLU COT STD PREWASHED DELINTED 4 PER STRL PK

## (undated) DEVICE — RECIPROCATING BLADE, DOUBLE SIDED, OFFSET  (70.0 X 0.64 X 12.6MM)

## (undated) DEVICE — GOWN,SIRUS,POLYRNF,BRTHSLV,XL,30/CS: Brand: MEDLINE

## (undated) DEVICE — GAUZE,SPONGE,4"X4",12PLY,STRL,LF,10/TRAY: Brand: MEDLINE

## (undated) DEVICE — SOLUTION SCRB 4OZ 10% POVIDONE IOD ANTIMIC BTL

## (undated) DEVICE — CLEANER CAUT TIP W2XL2IN RADPQ STRP STURDY ADH BK FOR EZ MT

## (undated) DEVICE — PIN GUIDE ORTHO 3.2X89MM FLTD DISP PK OF 4 PER PATIENT

## (undated) DEVICE — SOLUTION IRRIG 1000ML 09% SOD CHL USP PIC PLAS CONTAINER

## (undated) DEVICE — TRAP POLYP ETRAP

## (undated) DEVICE — SUTURE VCRL + SZ 4-0 L18IN ABSRB UD L19MM PS-2 3/8 CIR PRIM VCP496H

## (undated) DEVICE — SUTURE PDS + SZ 0 L27IN ABSRB VLT L36MM CT 1 1 2 CIR PDP340H

## (undated) DEVICE — SUTURE VCRL SZ 2-0 L18IN ABSRB UD CT-1 L36MM 1/2 CIR J839D

## (undated) DEVICE — FOLEY TRAY 1 LAYR 16 FR 10 CC URIMTR SNAPSECURE URO175816S

## (undated) DEVICE — FORCEPS BX 240CM 2.4MM L NDL RAD JAW 4 M00513334

## (undated) DEVICE — PICO 7 10CM X 30CM: Brand: PICO™ 7

## (undated) DEVICE — IMMOBILIZER KNEE CUTAWAY 24 IN

## (undated) DEVICE — SUTURE ETHILON SZ 3-0 L18IN NONABSORBABLE BLK L24MM FS-1 3/8 663G